# Patient Record
Sex: MALE | Race: WHITE | Employment: OTHER | URBAN - METROPOLITAN AREA
[De-identification: names, ages, dates, MRNs, and addresses within clinical notes are randomized per-mention and may not be internally consistent; named-entity substitution may affect disease eponyms.]

---

## 2019-02-06 ENCOUNTER — TRANSFERRED RECORDS (OUTPATIENT)
Dept: HEALTH INFORMATION MANAGEMENT | Facility: CLINIC | Age: 59
End: 2019-02-06

## 2019-02-08 ENCOUNTER — TELEPHONE (OUTPATIENT)
Dept: ONCOLOGY | Facility: CLINIC | Age: 59
End: 2019-02-08

## 2019-02-08 NOTE — TELEPHONE ENCOUNTER
ONCOLOGY INTAKE: Records Information      APPT INFORMATION:  Referring provider:  Lucho  Referring provider s clinic:  [facility])  Reason for visit/diagnosis:  Prostate Cancer    Were the records received with the referral (via [employer])? No, sb pt    Has patient been seen for any external appt for this diagnosis (enter clinic/location)? [facility]

## 2019-02-11 NOTE — TELEPHONE ENCOUNTER
Spoke with [facility] and they will push imaging as soon as possible.     Request for slides sent to yonathan at 2:44

## 2019-02-12 ENCOUNTER — ONCOLOGY VISIT (OUTPATIENT)
Dept: ONCOLOGY | Facility: CLINIC | Age: 59
End: 2019-02-12
Attending: INTERNAL MEDICINE
Payer: COMMERCIAL

## 2019-02-12 ENCOUNTER — PRE VISIT (OUTPATIENT)
Dept: ONCOLOGY | Facility: CLINIC | Age: 59
End: 2019-02-12

## 2019-02-12 VITALS
RESPIRATION RATE: 16 BRPM | WEIGHT: 242.4 LBS | TEMPERATURE: 97.9 F | DIASTOLIC BLOOD PRESSURE: 82 MMHG | SYSTOLIC BLOOD PRESSURE: 146 MMHG | HEART RATE: 53 BPM | OXYGEN SATURATION: 96 %

## 2019-02-12 DIAGNOSIS — C79.51 MALIGNANT NEOPLASM OF PROSTATE METASTATIC TO BONE (H): Primary | ICD-10-CM

## 2019-02-12 DIAGNOSIS — C61 MALIGNANT NEOPLASM OF PROSTATE METASTATIC TO BONE (H): Primary | ICD-10-CM

## 2019-02-12 DIAGNOSIS — N28.89 RENAL MASS: ICD-10-CM

## 2019-02-12 PROBLEM — E74.39 GLUCOSE INTOLERANCE: Status: ACTIVE | Noted: 2018-12-14

## 2019-02-12 PROBLEM — E78.5 DYSLIPIDEMIA: Status: ACTIVE | Noted: 2017-08-16

## 2019-02-12 PROCEDURE — G0463 HOSPITAL OUTPT CLINIC VISIT: HCPCS | Mod: ZF

## 2019-02-12 PROCEDURE — 99205 OFFICE O/P NEW HI 60 MIN: CPT | Mod: ZP | Performed by: INTERNAL MEDICINE

## 2019-02-12 RX ORDER — AMPICILLIN TRIHYDRATE 250 MG
500 CAPSULE ORAL DAILY
COMMUNITY
End: 2020-07-10

## 2019-02-12 RX ORDER — CETIRIZINE HYDROCHLORIDE 10 MG/1
10 TABLET ORAL PRN
COMMUNITY
Start: 2016-06-28 | End: 2022-08-02

## 2019-02-12 RX ORDER — BICALUTAMIDE 50 MG/1
50 TABLET, FILM COATED ORAL
COMMUNITY
Start: 2019-02-08 | End: 2019-02-14

## 2019-02-12 RX ORDER — HYDROCORTISONE ACETATE 0.5 %
1 CREAM (GRAM) TOPICAL DAILY
COMMUNITY
Start: 2017-08-16 | End: 2024-02-13

## 2019-02-12 RX ORDER — LEVOTHYROXINE SODIUM 125 UG/1
100 TABLET ORAL DAILY
COMMUNITY
Start: 2018-12-14 | End: 2020-07-10

## 2019-02-12 RX ORDER — CYCLOSPORINE 0.5 MG/ML
1 EMULSION OPHTHALMIC 2 TIMES DAILY
COMMUNITY

## 2019-02-12 RX ORDER — AMOXICILLIN 250 MG
2 CAPSULE ORAL DAILY
COMMUNITY
Start: 2016-06-28

## 2019-02-12 RX ORDER — FLUTICASONE PROPIONATE 50 MCG
2 SPRAY, SUSPENSION (ML) NASAL DAILY PRN
COMMUNITY
Start: 2015-06-06 | End: 2022-08-02

## 2019-02-12 RX ORDER — ATORVASTATIN CALCIUM 20 MG/1
60 TABLET, FILM COATED ORAL DAILY
COMMUNITY
Start: 2018-12-14 | End: 2024-07-15 | Stop reason: DRUGHIGH

## 2019-02-12 ASSESSMENT — PAIN SCALES - GENERAL: PAINLEVEL: MILD PAIN (2)

## 2019-02-12 NOTE — LETTER
"2/12/2019       RE: Walter Reyes  [id number]  [facility] [id number]     Dear Colleague,    Thank you for referring your patient, Walter Reyes, to the [facility]. Please see a copy of my visit note below.    MEDICAL ONCOLOGY NEW PATIENT CLINIC NOTE    ENCOUNTER DATE: 2/12/2019  REFERRING PROVIDER: Jorge Alberto Yepez MD at [facility].    REASON FOR CURRENT VISIT: New diagnosis of metastatic prostate cancer.    HISTORY OF PRESENT ILLNESS:  Mr. Walter Reyes is a 58-year-old gentleman referred by Dr. Yepez for a new diagnosis of metastatic castration-sensitive prostate cancer. His oncologic history is detailed below.  His wife, Micheline, accompanies him for this visit.    At this time, there is main symptom is pain at the site of the rib lesion biopsy, as well as some ache in the lower left abdomen.    ONCOLOGIC HISTORY:  1. Prostate adenocarcinoma, stage IV (M1b at diagnosis), high-volume, castration-sensitive:  - 12/13/2018: PSA found to be elevated to 9.1 ng/mL on a routine follow-up with primary care provider Dr. Naylor at [facility]. Prior PSA were 1.4 on 8/16/17, 2.4 on 6/28/16, 2.9 on 6/28/16, and as low as 0.4 on 3/26/2003.   - 1/08/2019: Consultation with Sarah Wilson CNP in Urology clinic. Repeat PSA 9.6.  - 1/16/2019: MRI prostate with contrast - \"This examination is characterized as PIRADS 5- very high probability. Clinically significant cancer is highly likely to be present. There is a large, invasive mass arising from the right peripheral zone and extending into the neurovascular bundle, seminal vesicle, and along the anterolateral right mesorectal fascia. Metastatic right external iliac lymph node. Metastatic lesion in the posterior/superior right acetabulum.\"  - 1/25/2019: CT abdomen and pelvis with IV contrast - \"1. Heterogeneous enhancing mass posteriorly in the upper pole of the right kidney measures 4.5 x 5.8 x " "5.7 cm (AP by transverse by craniocaudal). It has a small nodular component extending posterior medially which abuts the right psoas muscle. This nodular extension measures 2.1 x 2.1 cm. Minimal stranding about the mass. No definite thrombus within the right renal vein. This renal mass is compatible with a renal cell carcinoma. A paraaortic lymph node situated immediately posterior to the left renal vein measures 1.1 cm in short axis, suspicious for metastasis. 2. A 2 cm right external iliac lymph node is also suspicious for metastases. 3. Multiple sclerotic osseous lesions suspicious for metastases. These include 0.8 and 0.6 cm sclerotic lesions laterally in the right iliac wing (images 53 and 62 respectively). Ill-defined groundglass density laterally in the right acetabulum measuring 1.7 x 1.2 cm corresponds with the lesion identified on MRI. A 0.4 cm groundglass density in the left acetabulum. Sclerotic lesion in the left femoral neck measures 0.9 x 1.6 cm (image 81). Sclerotic metastases would be more compatible with prostate metastases. 4. A 3 subcentimeter hepatic lesions are indeterminate. Metastases would be a consideration. These can be further characterized with liver MRI.\"  - 1/25/2019: NM bone scan - \"There is focal bony uptake in the left femoral neck, right acetabulum, right of midline at the S1 or L5 level of the spine, within multiple bilateral ribs, in the C7 or T1 level of the spine, and anteriorly within the skull. Findings are suspicious for metastases.\"  - 1/31/19: CT chest with contrast - \" No suspicious nodules in the chest.  Stable appearance of a 5.8 cm right renal mass concerning for renal carcinoma until proven otherwise.  Stable indeterminant subcentimeter hypodensities in the liver.  Multifocal osteoblastic metastasis including 2.5 cm lesion in the right fifth rib posteriorly and a 1.6 cm lesion in the right third rib posteriorly. No lytic lesions identified.\"  - 2/6/19: CT-guided right " "sclerotic fifth rib lesion biopsy - \"Metastatic carcinoma, consistent with prostate primary.  Immunohistochemical stains performed show the metastatic carcinoma stains positive for NKX3.1 (prostate marker), negative for STACIE 3 and PAX8, which supports the above diagnosis.\"    2. Suspected stage II/III RCC:  - As above.     REVIEW OF SYSTEMS: 14 point ROS negative other than the symptoms noted above in the HPI.    PAST MEDICAL HISTORY:  1. Prostate cancer.  2. Hypothyroidism.  3. Allergic rhinitis.  4. Esophageal reflux disorder.  5. Dyslipidemia.  6. Glucose intolerance.  7. Obesity.     PAST SURGICAL HISTORY:   As above.    SOCIAL HISTORY:   He is  and lives with his wife, Micheline in [place]. He has 4 children and 3 sisters (2 elder brothers  from heart failure). Denies tobacco, alcohol or illicit drug use. 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    FAMILY HISTORY:   He does have a remarkable family history with several maternal relatives affected with cancer.  He has 5 siblings including one sister with skin cancer but no other malignancies.  2 of his brothers passed away at age 68 each because of smoking alcohol use and illicit drug use related heart failure.  Several maternal aunts with lymphoma, colon, liver, breast, and lung cancer, as well as a maternal uncle with lung cancer. His paternal uncle had stomach cancer.     ALLERGIES:   Allergies   Allergen Reactions     Doxycycline GI Disturbance     CURRENT MEDICATIONS:   Current Outpatient Medications:      atorvastatin (LIPITOR) 20 MG tablet, Take 20 mg by mouth daily, Disp: , Rfl:      bicalutamide (CASODEX) 50 MG tablet, Take 50 mg by mouth, Disp: , Rfl:      calcium citrate-vitamin D (CITRACAL) 315-250 MG-UNIT TABS per tablet, Take 650 mg by mouth, Disp: , Rfl:      cetirizine (ZYRTEC) 10 MG tablet, Take 10 mg by mouth as needed, Disp: , Rfl:      cycloSPORINE (RESTASIS) 0.05 % ophthalmic emulsion, 1 " drop by Both Eyelids route 2 times daily, Disp: , Rfl:      fluticasone (FLONASE) 50 MCG/ACT nasal spray, Spray 2 sprays in nostril every 24 hours, Disp: , Rfl:      Glucosamine-Chondroit-Vit C-Mn (GLUCOSAMINE 1500 COMPLEX) CAPS, Take 1 capsule by mouth daily, Disp: , Rfl:      levothyroxine (SYNTHROID/LEVOTHROID) 125 MCG tablet, Take 125 mcg by mouth daily, Disp: , Rfl:      Multiple Vitamins-Minerals (MULTIVITAMIN ADULT EXTRA C PO), Take 1 tablet by mouth every 24 hours, Disp: , Rfl:      omeprazole (PRILOSEC) 20 MG DR capsule, Take 20 mg by mouth daily, Disp: , Rfl:      cinnamon 500 MG CAPS, Take 1,500 mg by mouth daily, Disp: , Rfl:      Nutritional Supplements (SALMON OIL) CAPS, Take 1 capsule by mouth daily, Disp: , Rfl:     PHYSICAL EXAMINATION:  Vital signs: BP (!) 160/97 (BP Location: Right arm, Patient Position: Sitting, Cuff Size: Adult Large)   Pulse 53   Temp 97.9  F (36.6  C) (Oral)   Resp 16   Wt 110 kg (242 lb 6.4 oz)   SpO2 96%   ECOG performance status of 0. Fatigue 0.  GENERAL: Well-nourished healthy-appearing man in chair, no acute distress.   HEENT: No icterus, no pallor. Moist mucous membranes. Oropharynx is clear.   NECK: Supple, no JVD/LAD.  LUNGS: Clear to ausculation bilaterally, normal work of breathing.   CARDIOVASCULAR: Regular rate and rhythm, no murmurs, gallops or rubs.   ABDOMEN: Soft, nontender and nondistended, no palpable masses, bowel sounds present.  EXTREMITIES: No cyanosis, no clubbing, no edema.   NEUROLOGIC: No focal deficits, CN 2-12 intact.  LYMPH NODE EXAM: No palpable adenopathy - cervical, axillary or inguinal.     LABORATORY DATA:  As above.    IMAGING STUDIES:  As above.    ASSESSMENT AND PLAN: Mr. Reyes is a delightful 58-year-old gentleman with newly diagnosed metastatic castration sensitive prostate adenocarcinoma as well as a suspected stage II versus 3 renal cell carcinoma of right kidney, who is here for a second opinion.    Metastatic prostate  "cancer:   - I reviewed the available diagnostic data including staging MRI, CT and bone scan results as well as the pathology report (from outside institution) with the patient and his wife at length today. He meets the criteria for CHAARTED \"high-volume\" metastatic hormone-sensitive prostate cancer.  - Most striking is the finding of 2 concurrent malignancies in this relatively young gentleman with a very high risk family history of malignancies.  While biologically there is a little overlap between prostate cancer and renal cancer, this can be seen in some inherited syndromes.  If present, such an aberration could have implications for his prostate cancer biology as well as clinical trajectory.  - I do not have a clinical trial for this gentleman at this time, especially given the suspicion for a concurrent malignancy.  We briefly discussed the biobanking study that he could potentially enroll in, but this would only be for research purposes.    - We discussed systemic therapy options for newly-diagnosed metastatic hormone-sensitive prostate cancer including GNRH agonist versus antagonist by itself versus GNRH agonist/antagonist in combination with docetaxel (ARTUROED, [id number], MOOKIEE) or abiraterone (KARLA, BERNA) based on his disease volume and preference in terms of therapeutic options.   - He wants to pursue chemohormonal therapy after understanding the options. His decision is based on the relatively short course of chemotherapy, financial considerations (copay with abiraterone), and lack of strong desire to be on an oral antineoplastic therapy for several years potentially.   - He understands the risks of docetaxel treatment including fatigue, nausea, vomiting, diarrhea/constipation, hand-foot syndrome, allergic reactions, myelosuppression with increased risk of infection and bleeding etc. We discussed the risks and benefits of ADT including hot flashes, mood changes and long-term " cardiovascular, bone health, etc. side effects in detail.  - I did encourage him to follow-up with Dr. Yepez's clinic to get started on ADT as soon as possible.  The likelihood of a bone flare phenomena with his tumor burden and distribution is low, but it is appropriate to start with 2 weeks of Casodex followed by Lupron indefinitely.    - Timing of docetaxel therapy will depend on workup of renal mass. My preference is to work up renal carcinoma issue first as docetaxel can be initiated several months after ADT with no deleterious impact. If docetaxel is started now, definitive management of renal carcinoma would likely be deferred for 6-7 months (4.5 months of chemo, 1.5-3 months of recovery).  - Also encouraged to discuss antiresorptive therapies with his primary oncologist.    - He will need to be referred to genetic counselor as soon as possible.      Possible kidney cancer:  - I reviewed the available diagnostic data as it pertains to possibly renal cancer diagnosis including staging CT and bone scan results.  The renal mass does appear to invade through the capsule and into the psoas muscle ipsilaterally with potential lymph node enlargement in the drainage basin.    - I have recommended that he see 1 of our urologic oncologist for this issue.  Also recommended that we discussed his case in the tumor board.    - His kidney cancer may be curable he understands that there is a likelihood that his kidney cancer may be curable with a radical nephrectomy.  This will have implications on his prostate cancer management further outline especially if there are disparate radiographic findings in the future such as growing pulmonary nodules and sclerosing osteoblastic metastases in the bone.  It will also have applications on his eligibility for clinical trials in the future as patients with a concurrent active malignancy are almost uniformly excluded from clinical trials.     After this discussion, the patient has  opted to take a couple days and let me know if they want to proceed with urology and genetics referral as well as a tumor board discussion.  They have not yet decided on where to pursue systemic therapy for prostate cancer, and will let our clinic know about that as well.  If they want to continue care with me, he'll need to start Casodex this week.     No follow-ups will be set at this time, but he was given my contact information should he need to reach out in the future.    Patient and family were very appreciative of the care provided and in complete agreement this plan.  All questions were answered.    I appreciate Dr. Yepez's referral to the clinic and will send him this note for review.      Uday Smalls M.D.      CC: Jorge Alberto Yepez MD at [facility].

## 2019-02-12 NOTE — NURSING NOTE
Oncology Rooming Note    February 12, 2019 3:21 PM   Walter Reyes is a 58 year old male who presents for:    Chief Complaint   Patient presents with     Oncology Clinic Visit     new pt pending BX prostate cancer second opinion      Initial Vitals: BP (!) 160/97 (BP Location: Right arm, Patient Position: Sitting, Cuff Size: Adult Large)   Pulse 53   Temp 97.9  F (36.6  C) (Oral)   Resp 16   Wt 110 kg (242 lb 6.4 oz)   SpO2 96%  There is no height or weight on file to calculate BMI. There is no height or weight on file to calculate BSA.  Mild Pain (2) Comment: Data Unavailable   No LMP for male patient.  Allergies reviewed: Yes  Medications reviewed: Yes    Medications Refill is not needed   Pharmacy name entered into EPIC: [facility] [place], [place] - [address] [address]    Clinical concerns: none      8 minutes for nursing intake (face to face time)     Luz Danilo, LISSETTE

## 2019-02-12 NOTE — PROGRESS NOTES
"MEDICAL ONCOLOGY NEW PATIENT CLINIC NOTE    ENCOUNTER DATE: 2/12/2019  REFERRING PROVIDER: Jorge Alberto Yepez MD at [facility].    REASON FOR CURRENT VISIT: New diagnosis of metastatic prostate cancer.    HISTORY OF PRESENT ILLNESS:  Mr. Walter Reyes is a 58-year-old gentleman referred by Dr. Yepez for a new diagnosis of metastatic castration-sensitive prostate cancer. His oncologic history is detailed below.  His wife, Micheline, accompanies him for this visit.    At this time, there is main symptom is pain at the site of the rib lesion biopsy, as well as some ache in the lower left abdomen.    ONCOLOGIC HISTORY:  1. Prostate adenocarcinoma, stage IV (M1b at diagnosis), high-volume, castration-sensitive:  - 12/13/2018: PSA found to be elevated to 9.1 ng/mL on a routine follow-up with primary care provider Dr. Naylor at [facility]. Prior PSA were 1.4 on 8/16/17, 2.4 on 6/28/16, 2.9 on 6/28/16, and as low as 0.4 on 3/26/2003.   - 1/08/2019: Consultation with Sarah Wilson CNP in Urology clinic. Repeat PSA 9.6.  - 1/16/2019: MRI prostate with contrast - \"This examination is characterized as PIRADS 5- very high probability. Clinically significant cancer is highly likely to be present. There is a large, invasive mass arising from the right peripheral zone and extending into the neurovascular bundle, seminal vesicle, and along the anterolateral right mesorectal fascia. Metastatic right external iliac lymph node. Metastatic lesion in the posterior/superior right acetabulum.\"  - 1/25/2019: CT abdomen and pelvis with IV contrast - \"1. Heterogeneous enhancing mass posteriorly in the upper pole of the right kidney measures 4.5 x 5.8 x 5.7 cm (AP by transverse by craniocaudal). It has a small nodular component extending posterior medially which abuts the right psoas muscle. This nodular extension measures 2.1 x 2.1 cm. Minimal stranding about the mass. No definite thrombus within the right " "renal vein. This renal mass is compatible with a renal cell carcinoma. A paraaortic lymph node situated immediately posterior to the left renal vein measures 1.1 cm in short axis, suspicious for metastasis. 2. A 2 cm right external iliac lymph node is also suspicious for metastases. 3. Multiple sclerotic osseous lesions suspicious for metastases. These include 0.8 and 0.6 cm sclerotic lesions laterally in the right iliac wing (images 53 and 62 respectively). Ill-defined groundglass density laterally in the right acetabulum measuring 1.7 x 1.2 cm corresponds with the lesion identified on MRI. A 0.4 cm groundglass density in the left acetabulum. Sclerotic lesion in the left femoral neck measures 0.9 x 1.6 cm (image 81). Sclerotic metastases would be more compatible with prostate metastases. 4. A 3 subcentimeter hepatic lesions are indeterminate. Metastases would be a consideration. These can be further characterized with liver MRI.\"  - 1/25/2019: NM bone scan - \"There is focal bony uptake in the left femoral neck, right acetabulum, right of midline at the S1 or L5 level of the spine, within multiple bilateral ribs, in the C7 or T1 level of the spine, and anteriorly within the skull. Findings are suspicious for metastases.\"  - 1/31/19: CT chest with contrast - \" No suspicious nodules in the chest.  Stable appearance of a 5.8 cm right renal mass concerning for renal carcinoma until proven otherwise.  Stable indeterminant subcentimeter hypodensities in the liver.  Multifocal osteoblastic metastasis including 2.5 cm lesion in the right fifth rib posteriorly and a 1.6 cm lesion in the right third rib posteriorly. No lytic lesions identified.\"  - 2/6/19: CT-guided right sclerotic fifth rib lesion biopsy - \"Metastatic carcinoma, consistent with prostate primary.  Immunohistochemical stains performed show the metastatic carcinoma stains positive for NKX3.1 (prostate marker), negative for STACIE 3 and PAX8, which supports the " "above diagnosis.\"    2. Suspected stage II/III RCC:  - As above.     REVIEW OF SYSTEMS: 14 point ROS negative other than the symptoms noted above in the HPI.    PAST MEDICAL HISTORY:  1. Prostate cancer.  2. Hypothyroidism.  3. Allergic rhinitis.  4. Esophageal reflux disorder.  5. Dyslipidemia.  6. Glucose intolerance.  7. Obesity.     PAST SURGICAL HISTORY:   As above.    SOCIAL HISTORY:   He is  and lives with his wife, Micheline in [place]. He has 4 children and 3 sisters (2 elder brothers  from heart failure). Denies tobacco, alcohol or illicit drug use. 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    FAMILY HISTORY:   He does have a remarkable family history with several maternal relatives affected with cancer.  He has 5 siblings including one sister with skin cancer but no other malignancies.  2 of his brothers passed away at age 68 each because of smoking alcohol use and illicit drug use related heart failure.  Several maternal aunts with lymphoma, colon, liver, breast, and lung cancer, as well as a maternal uncle with lung cancer. His paternal uncle had stomach cancer.     ALLERGIES:   Allergies   Allergen Reactions     Doxycycline GI Disturbance     CURRENT MEDICATIONS:   Current Outpatient Medications:      atorvastatin (LIPITOR) 20 MG tablet, Take 20 mg by mouth daily, Disp: , Rfl:      bicalutamide (CASODEX) 50 MG tablet, Take 50 mg by mouth, Disp: , Rfl:      calcium citrate-vitamin D (CITRACAL) 315-250 MG-UNIT TABS per tablet, Take 650 mg by mouth, Disp: , Rfl:      cetirizine (ZYRTEC) 10 MG tablet, Take 10 mg by mouth as needed, Disp: , Rfl:      cycloSPORINE (RESTASIS) 0.05 % ophthalmic emulsion, 1 drop by Both Eyelids route 2 times daily, Disp: , Rfl:      fluticasone (FLONASE) 50 MCG/ACT nasal spray, Spray 2 sprays in nostril every 24 hours, Disp: , Rfl:      Glucosamine-Chondroit-Vit C-Mn (GLUCOSAMINE 1500 COMPLEX) CAPS, Take 1 capsule by mouth " "daily, Disp: , Rfl:      levothyroxine (SYNTHROID/LEVOTHROID) 125 MCG tablet, Take 125 mcg by mouth daily, Disp: , Rfl:      Multiple Vitamins-Minerals (MULTIVITAMIN ADULT EXTRA C PO), Take 1 tablet by mouth every 24 hours, Disp: , Rfl:      omeprazole (PRILOSEC) 20 MG DR capsule, Take 20 mg by mouth daily, Disp: , Rfl:      cinnamon 500 MG CAPS, Take 1,500 mg by mouth daily, Disp: , Rfl:      Nutritional Supplements (SALMON OIL) CAPS, Take 1 capsule by mouth daily, Disp: , Rfl:     PHYSICAL EXAMINATION:  Vital signs: BP (!) 160/97 (BP Location: Right arm, Patient Position: Sitting, Cuff Size: Adult Large)   Pulse 53   Temp 97.9  F (36.6  C) (Oral)   Resp 16   Wt 110 kg (242 lb 6.4 oz)   SpO2 96%   ECOG performance status of 0. Fatigue 0.  GENERAL: Well-nourished healthy-appearing man in chair, no acute distress.   HEENT: No icterus, no pallor. Moist mucous membranes. Oropharynx is clear.   NECK: Supple, no JVD/LAD.  LUNGS: Clear to ausculation bilaterally, normal work of breathing.   CARDIOVASCULAR: Regular rate and rhythm, no murmurs, gallops or rubs.   ABDOMEN: Soft, nontender and nondistended, no palpable masses, bowel sounds present.  EXTREMITIES: No cyanosis, no clubbing, no edema.   NEUROLOGIC: No focal deficits, CN 2-12 intact.  LYMPH NODE EXAM: No palpable adenopathy - cervical, axillary or inguinal.     LABORATORY DATA:  As above.    IMAGING STUDIES:  As above.    ASSESSMENT AND PLAN: Mr. Reyes is a delightful 58-year-old gentleman with newly diagnosed metastatic castration sensitive prostate adenocarcinoma as well as a suspected stage II versus 3 renal cell carcinoma of right kidney, who is here for a second opinion.    Metastatic prostate cancer:   - I reviewed the available diagnostic data including staging MRI, CT and bone scan results as well as the pathology report (from outside institution) with the patient and his wife at length today. He meets the criteria for CHAARTED \"high-volume\" " metastatic hormone-sensitive prostate cancer.  - Most striking is the finding of 2 concurrent malignancies in this relatively young gentleman with a very high risk family history of malignancies.  While biologically there is a little overlap between prostate cancer and renal cancer, this can be seen in some inherited syndromes.  If present, such an aberration could have implications for his prostate cancer biology as well as clinical trajectory.  - I do not have a clinical trial for this gentleman at this time, especially given the suspicion for a concurrent malignancy.  We briefly discussed the biobanking study that he could potentially enroll in, but this would only be for research purposes.    - We discussed systemic therapy options for newly-diagnosed metastatic hormone-sensitive prostate cancer including GNRH agonist versus antagonist by itself versus GNRH agonist/antagonist in combination with docetaxel (CHAARTED, GETUG-AFU15, STAMPEDE) or abiraterone (KARLA, LATMITCHELL) based on his disease volume and preference in terms of therapeutic options.   - He wants to pursue chemohormonal therapy after understanding the options. His decision is based on the relatively short course of chemotherapy, financial considerations (copay with abiraterone), and lack of strong desire to be on an oral antineoplastic therapy for several years potentially.   - He understands the risks of docetaxel treatment including fatigue, nausea, vomiting, diarrhea/constipation, hand-foot syndrome, allergic reactions, myelosuppression with increased risk of infection and bleeding etc. We discussed the risks and benefits of ADT including hot flashes, mood changes and long-term cardiovascular, bone health, etc. side effects in detail.  - I did encourage him to follow-up with Dr. Yepez's clinic to get started on ADT as soon as possible.  The likelihood of a bone flare phenomena with his tumor burden and distribution is low, but it is  appropriate to start with 2 weeks of Casodex followed by Lupron indefinitely.    - Timing of docetaxel therapy will depend on workup of renal mass. My preference is to work up renal carcinoma issue first as docetaxel can be initiated several months after ADT with no deleterious impact. If docetaxel is started now, definitive management of renal carcinoma would likely be deferred for 6-7 months (4.5 months of chemo, 1.5-3 months of recovery).  - Also encouraged to discuss antiresorptive therapies with his primary oncologist.    - He will need to be referred to genetic counselor as soon as possible.      Possible kidney cancer:  - I reviewed the available diagnostic data as it pertains to possibly renal cancer diagnosis including staging CT and bone scan results.  The renal mass does appear to invade through the capsule and into the psoas muscle ipsilaterally with potential lymph node enlargement in the drainage basin.    - I have recommended that he see 1 of our urologic oncologist for this issue.  Also recommended that we discussed his case in the tumor board.    - His kidney cancer may be curable he understands that there is a likelihood that his kidney cancer may be curable with a radical nephrectomy.  This will have implications on his prostate cancer management further outline especially if there are disparate radiographic findings in the future such as growing pulmonary nodules and sclerosing osteoblastic metastases in the bone.  It will also have applications on his eligibility for clinical trials in the future as patients with a concurrent active malignancy are almost uniformly excluded from clinical trials.     After this discussion, the patient has opted to take a couple days and let me know if they want to proceed with urology and genetics referral as well as a tumor board discussion.  They have not yet decided on where to pursue systemic therapy for prostate cancer, and will let our clinic know about that  as well.  If they want to continue care with me, he'll need to start Casodex this week.     No follow-ups will be set at this time, but he was given my contact information should he need to reach out in the future.    Patient and family were very appreciative of the care provided and in complete agreement this plan.  All questions were answered.    I appreciate Dr. Yepez's referral to the clinic and will send him this note for review.    BILLIN.    Uday Smalls M.D.  . Professor of Medicine  Genitourinary Oncology  Division of Hematology, Oncology & Transplantation  [facility]    CC: Jorge Alberto Yepez MD at [facility].

## 2019-02-13 ENCOUNTER — CARE COORDINATION (OUTPATIENT)
Dept: ONCOLOGY | Facility: CLINIC | Age: 59
End: 2019-02-13

## 2019-02-13 NOTE — PROGRESS NOTES
TC from pt stating that he does want to transfer care to [facility] from [facility]. Told pt writer will call Dr. Smalls and confirm what the next steps should be and call him back.     TC to Dr. Smalls who stated that pt is to start bicalutamide this week, 50 mg daily. Pt to be asked if he would consider enrolling in the biorepository study as discussed during his appt. If he is interested, Rosina will arrange for appointment for blood collection. Dr. Smalls to initiate communication with Rosina.     TC back to pt to explain plan for now. Pt stated he has an Rx for bicalutamide ready at the pharmacy from his other oncologist and will go pick it up. Told pt that per Dr. Smalls, his dose should be bicalutamide 50 mg day and to call us if the dose is different to call [facility]. Pt also stated he needs short-term disability paperwork filled out; writer provided pt with clinic fax number. Pt stated understanding of all and agrees to call with any further questions or concerns. Pt asked when he will be starting on Lupron. Writer told pt to expect a call back regarding scheduling once Dr. Smalls is consulted. Message sent to Dr. Smalls regarding first Lupron. Await response.

## 2019-02-14 ENCOUNTER — TELEPHONE (OUTPATIENT)
Dept: ONCOLOGY | Facility: CLINIC | Age: 59
End: 2019-02-14

## 2019-02-14 ENCOUNTER — CARE COORDINATION (OUTPATIENT)
Dept: ONCOLOGY | Facility: CLINIC | Age: 59
End: 2019-02-14

## 2019-02-14 DIAGNOSIS — C61 MALIGNANT NEOPLASM OF PROSTATE METASTATIC TO BONE (H): Primary | ICD-10-CM

## 2019-02-14 DIAGNOSIS — C79.51 MALIGNANT NEOPLASM OF PROSTATE METASTATIC TO BONE (H): Primary | ICD-10-CM

## 2019-02-14 LAB — COPATH REPORT: NORMAL

## 2019-02-14 PROCEDURE — 00000346 ZZHCL STATISTIC REVIEW OUTSIDE SLIDES TC 88321: Performed by: INTERNAL MEDICINE

## 2019-02-14 RX ORDER — BICALUTAMIDE 50 MG/1
50 TABLET, FILM COATED ORAL DAILY
Qty: 30 TABLET | Refills: 0 | Status: SHIPPED | OUTPATIENT
Start: 2019-02-14 | End: 2019-04-30

## 2019-02-14 NOTE — PROGRESS NOTES
TC from pt's wife, Micheline, stating that the Rx for pt's Casodex was not at the pharmacy from pt's previous pharmacy as thought. Advised Micheline to call pt's oncologist a [facility] and ask if they would be willing to send it to the pharmacy as Dr. Smalls is out of town. Also advised that she may want to try calling pt's urologist at [facility] to see if they'd be willing to send Rx. Micheline stated understanding.     Micheline called back stating that [facility] providers want all care and all Rx's managed by [facility] providers. Casodex is not yet entered in pt's chart. Email sent to Dr. Smalls requesting he enter a new plan for Casodex. Await response.     Per Dr. Smalls:  Uday Smalls MD Jankovich, Diana, RN; Rosina Colón CMA Great. Thanks Aspen.   1. I've ordered Casodex and Urology referral. He should start this on Saturday.   2. Rosina - can you have him come in today/tomorrow to get consent and first blood sample? He has a rib biopsy at outside institution, so we should get him collected before starting Casodex.   3. I'll see him back on 2/26 to start Lupron. Orders placed. Please help schedule.   AR      Spoke with oral chemotherapy pharmacist, Kirby PUENTES, who stated that he can get Rx to pt via  this afternoon and will call pt and do the chemo teach. Requested Kirby tell pt to wait to start Casodex until after he gets his blood drawn for the Biorepository Study.    TC to Micheline to let her know to expect Rx to arrive today via  and that pt should expect a TC regarding chemo teaching. Micheline stated understanding and will give pt a heads-up to expect a call regarding chemo teaching.     Paged Rosina to let her know that pt wants to proceed with Biorepository Study. Rosina stated she will call pt to arrange visit to be consented and have blood drawn. Requested Rosina inform pt that Dr. Smalls wants him to wait to start his Casodex until after he has his blood drawn. Rosina agreed to pass message along.     TC from  Rosina stating that she will see pt tomorrow, 02/15/2019, at 1000. She stated she will make a lab appt for pt for study blood draw. Per Dr. Smalls, pt needs CBC/diff, CMP, PSA tumor marker, testosterone total, and chromogranin A. Rosina stated she will have these standard of care labs drawn as well as the study blood draw.

## 2019-02-14 NOTE — ORAL ONC MGMT
Oral Chemotherapy Monitoring Program    Primary Oncologist: Dr. Smalls  Primary Oncology Clinic: [facility]  Cancer Diagnosis: Prostate Cancer    Drug: biclutamide 50mg once daily   Start Date: 2/16/2019 after biorepository study   Dose is appropriate for patients:  Renal Function and  Hepatic Function   Expected duration of therapy: Until disease progression or unacceptable toxicity    Drug Interaction Assessment: No drug-drug interaction upon review of medication list:  -AtorvaSTATin -Citracal -Cetirizine -Restasis -Flonase -Glucosamine -Levothyroxine -Multivitamins -Omeprazole    Lab Monitoring Plan  No lab follow up.   Subjective/Objective:  Walter Reyes is a 58 year old male contacted by phone for an initial visit for oral chemotherapy education.        Last PHQ-2 Score on record: No flowsheet data found.    Patient does not report depression symptoms.      Vitals:  BP:   BP Readings from Last 1 Encounters:   02/12/19 146/82     Wt Readings from Last 1 Encounters:   02/12/19 110 kg (242 lb 6.4 oz)     There is no height or weight on file to calculate BSA.      Assessment:  Patient is appropriate to start therapy.    Plan:  Basic chemotherapy teaching was reviewed with the patient including indication, start date of therapy, dose, administration, adverse effects, missed doses, food and drug interactions, monitoring, side effect management, office contact information, and safe handling. Written materials were mailed 2/14/2019 and all questions answered.    Walter will start after lab draw for biorepository study. Instructed patient to call [facility] at [phone] to schedule delivery.    Follow-Up:  Oral chemotherapy monitoring program does not follow biclutamide and will not follow patient for follow up.        Thank you,  Kirby De La Garza, PharmD  Hematology/Oncology Clinical Pharmacist  [facility]

## 2019-02-15 ENCOUNTER — RESEARCH ENCOUNTER (OUTPATIENT)
Dept: ONCOLOGY | Facility: CLINIC | Age: 59
End: 2019-02-15

## 2019-02-15 ENCOUNTER — APPOINTMENT (OUTPATIENT)
Dept: ONCOLOGY | Facility: CLINIC | Age: 59
End: 2019-02-15
Attending: INTERNAL MEDICINE
Payer: COMMERCIAL

## 2019-02-15 DIAGNOSIS — C79.51 MALIGNANT NEOPLASM OF PROSTATE METASTATIC TO BONE (H): ICD-10-CM

## 2019-02-15 DIAGNOSIS — C61 MALIGNANT NEOPLASM OF PROSTATE METASTATIC TO BONE (H): ICD-10-CM

## 2019-02-15 LAB
ALBUMIN SERPL-MCNC: 3.6 G/DL (ref 3.4–5)
ALP SERPL-CCNC: 91 U/L (ref 40–150)
ALT SERPL W P-5'-P-CCNC: 26 U/L (ref 0–70)
ANION GAP SERPL CALCULATED.3IONS-SCNC: 7 MMOL/L (ref 3–14)
AST SERPL W P-5'-P-CCNC: 21 U/L (ref 0–45)
BASOPHILS # BLD AUTO: 0 10E9/L (ref 0–0.2)
BASOPHILS NFR BLD AUTO: 0.6 %
BILIRUB SERPL-MCNC: 0.5 MG/DL (ref 0.2–1.3)
BUN SERPL-MCNC: 15 MG/DL (ref 7–30)
CALCIUM SERPL-MCNC: 8.6 MG/DL (ref 8.5–10.1)
CHLORIDE SERPL-SCNC: 108 MMOL/L (ref 94–109)
CO2 SERPL-SCNC: 25 MMOL/L (ref 20–32)
CREAT SERPL-MCNC: 0.78 MG/DL (ref 0.66–1.25)
DIFFERENTIAL METHOD BLD: NORMAL
EOSINOPHIL # BLD AUTO: 0.2 10E9/L (ref 0–0.7)
EOSINOPHIL NFR BLD AUTO: 4.1 %
ERYTHROCYTE [DISTWIDTH] IN BLOOD BY AUTOMATED COUNT: 13.7 % (ref 10–15)
GFR SERPL CREATININE-BSD FRML MDRD: >90 ML/MIN/{1.73_M2}
GLUCOSE SERPL-MCNC: 86 MG/DL (ref 70–99)
HCT VFR BLD AUTO: 43.1 % (ref 40–53)
HGB BLD-MCNC: 14.7 G/DL (ref 13.3–17.7)
IMM GRANULOCYTES # BLD: 0 10E9/L (ref 0–0.4)
IMM GRANULOCYTES NFR BLD: 0.2 %
LYMPHOCYTES # BLD AUTO: 1.8 10E9/L (ref 0.8–5.3)
LYMPHOCYTES NFR BLD AUTO: 35.9 %
MCH RBC QN AUTO: 29.1 PG (ref 26.5–33)
MCHC RBC AUTO-ENTMCNC: 34.1 G/DL (ref 31.5–36.5)
MCV RBC AUTO: 85 FL (ref 78–100)
MONOCYTES # BLD AUTO: 0.5 10E9/L (ref 0–1.3)
MONOCYTES NFR BLD AUTO: 9.9 %
NEUTROPHILS # BLD AUTO: 2.5 10E9/L (ref 1.6–8.3)
NEUTROPHILS NFR BLD AUTO: 49.3 %
NRBC # BLD AUTO: 0 10*3/UL
NRBC BLD AUTO-RTO: 0 /100
PLATELET # BLD AUTO: 215 10E9/L (ref 150–450)
POTASSIUM SERPL-SCNC: 3.9 MMOL/L (ref 3.4–5.3)
PROT SERPL-MCNC: 7.4 G/DL (ref 6.8–8.8)
PSA SERPL-MCNC: 12.3 UG/L (ref 0–4)
RBC # BLD AUTO: 5.05 10E12/L (ref 4.4–5.9)
SODIUM SERPL-SCNC: 140 MMOL/L (ref 133–144)
WBC # BLD AUTO: 5.1 10E9/L (ref 4–11)

## 2019-02-15 PROCEDURE — 84153 ASSAY OF PSA TOTAL: CPT | Performed by: INTERNAL MEDICINE

## 2019-02-15 PROCEDURE — 86316 IMMUNOASSAY TUMOR OTHER: CPT | Performed by: INTERNAL MEDICINE

## 2019-02-15 PROCEDURE — 80053 COMPREHEN METABOLIC PANEL: CPT | Performed by: INTERNAL MEDICINE

## 2019-02-15 PROCEDURE — 85025 COMPLETE CBC W/AUTO DIFF WBC: CPT | Performed by: INTERNAL MEDICINE

## 2019-02-15 PROCEDURE — 84403 ASSAY OF TOTAL TESTOSTERONE: CPT | Performed by: INTERNAL MEDICINE

## 2019-02-15 PROCEDURE — 36415 COLL VENOUS BLD VENIPUNCTURE: CPT

## 2019-02-15 NOTE — PROGRESS NOTES
[id number]: Informed Consent Note     The consent form, including purpose, risks and benefits, was reviewed with Walter Booth Germányue, and all questions were answered before he signed the consent form. The patient understands that the study involves an active treatment phase as well as a post-treatment follow up phase.     Present during the discussion was myself and his wife. A copy of the signed form was provided to the patient. No procedures specific to this study were performed prior to the patient signing the consent form.    Consent Version Date: 07 DEC 2018  Consent obtained by: Rosina Colón    Date: 15 FEB 2019  HIPAA authorization signed?: yes  HIPAA authorization version date: 21 NOV 2018    Rosina Colón  Clinical Research Coordinator:  Phone: [phone]  Pager: [phone]    Form 503.03.01 (Version 2)     Effective date: 01AUG2018     Next Review Date: 01AUG2020 [id number]: Study Visit Note   Subject name: Walter Reyes     Visit: Baseline    Did the study visit occur within the appropriate window allowed by the protocol? yes    If no, why? N/A    Baseline blood was collected after signed consent.   1 Heparin tube notification received - TTL Sample ID: [id number]  2 EDTA tube notification received - TTL Sample ID: [id number]  Walter Reyes was given the opportunity to ask any trial related questions.     Rosina Colón  Clinical Research Coordinator:  Phone: [phone]  Pager: [phone]

## 2019-02-15 NOTE — NURSING NOTE
Chief Complaint   Patient presents with     Blood Draw     Pt is here for venipuncture, blood draw only.     Lilli Casillas CMA

## 2019-02-16 LAB — CGA SERPL-MCNC: 475 NG/ML (ref 0–95)

## 2019-02-19 LAB — TESTOST SERPL-MCNC: 222 NG/DL (ref 240–950)

## 2019-02-20 ENCOUNTER — DOCUMENTATION ONLY (OUTPATIENT)
Dept: ONCOLOGY | Facility: CLINIC | Age: 59
End: 2019-02-20

## 2019-02-20 NOTE — PROGRESS NOTES
Form Request Documentation    Date Received in Clinic:    Name/Type of Form: [employer] Disability  Questions that need to be addressed:   Current Employment Status: currently working on a full time basis    Amount of Leave Requested: Intermittent Leave now - TBD   Other: None  Date Completed: 2/20/2019  Copy Mailed to patient: Yes on 2/20/2019  Disposition of Form: Fax to [employer] at [phone] on 2/20/2019

## 2019-02-26 ENCOUNTER — APPOINTMENT (OUTPATIENT)
Dept: LAB | Facility: CLINIC | Age: 59
End: 2019-02-26
Attending: INTERNAL MEDICINE
Payer: COMMERCIAL

## 2019-02-26 ENCOUNTER — ONCOLOGY VISIT (OUTPATIENT)
Dept: ONCOLOGY | Facility: CLINIC | Age: 59
End: 2019-02-26
Attending: INTERNAL MEDICINE
Payer: COMMERCIAL

## 2019-02-26 VITALS
OXYGEN SATURATION: 97 % | SYSTOLIC BLOOD PRESSURE: 138 MMHG | RESPIRATION RATE: 18 BRPM | TEMPERATURE: 98.6 F | HEIGHT: 72 IN | DIASTOLIC BLOOD PRESSURE: 84 MMHG | WEIGHT: 204.5 LBS | HEART RATE: 52 BPM | BODY MASS INDEX: 27.7 KG/M2

## 2019-02-26 DIAGNOSIS — C79.51 MALIGNANT NEOPLASM OF PROSTATE METASTATIC TO BONE (H): Primary | ICD-10-CM

## 2019-02-26 DIAGNOSIS — C61 MALIGNANT NEOPLASM OF PROSTATE METASTATIC TO BONE (H): Primary | ICD-10-CM

## 2019-02-26 LAB
ALBUMIN SERPL-MCNC: 3.8 G/DL (ref 3.4–5)
ALP SERPL-CCNC: 98 U/L (ref 40–150)
ALT SERPL W P-5'-P-CCNC: 26 U/L (ref 0–70)
ANION GAP SERPL CALCULATED.3IONS-SCNC: 6 MMOL/L (ref 3–14)
AST SERPL W P-5'-P-CCNC: 23 U/L (ref 0–45)
BASOPHILS # BLD AUTO: 0 10E9/L (ref 0–0.2)
BASOPHILS NFR BLD AUTO: 0.6 %
BILIRUB SERPL-MCNC: 0.3 MG/DL (ref 0.2–1.3)
BUN SERPL-MCNC: 16 MG/DL (ref 7–30)
CALCIUM SERPL-MCNC: 8.5 MG/DL (ref 8.5–10.1)
CHLORIDE SERPL-SCNC: 108 MMOL/L (ref 94–109)
CO2 SERPL-SCNC: 26 MMOL/L (ref 20–32)
CREAT SERPL-MCNC: 0.87 MG/DL (ref 0.66–1.25)
DIFFERENTIAL METHOD BLD: ABNORMAL
EOSINOPHIL # BLD AUTO: 0.2 10E9/L (ref 0–0.7)
EOSINOPHIL NFR BLD AUTO: 3.4 %
ERYTHROCYTE [DISTWIDTH] IN BLOOD BY AUTOMATED COUNT: 13.8 % (ref 10–15)
GFR SERPL CREATININE-BSD FRML MDRD: >90 ML/MIN/{1.73_M2}
GLUCOSE SERPL-MCNC: 87 MG/DL (ref 70–99)
HCT VFR BLD AUTO: 45.3 % (ref 40–53)
HGB BLD-MCNC: 14.1 G/DL (ref 13.3–17.7)
IMM GRANULOCYTES # BLD: 0 10E9/L (ref 0–0.4)
IMM GRANULOCYTES NFR BLD: 0.3 %
LYMPHOCYTES # BLD AUTO: 2 10E9/L (ref 0.8–5.3)
LYMPHOCYTES NFR BLD AUTO: 30.1 %
MCH RBC QN AUTO: 27.6 PG (ref 26.5–33)
MCHC RBC AUTO-ENTMCNC: 31.1 G/DL (ref 31.5–36.5)
MCV RBC AUTO: 89 FL (ref 78–100)
MONOCYTES # BLD AUTO: 0.6 10E9/L (ref 0–1.3)
MONOCYTES NFR BLD AUTO: 9.9 %
NEUTROPHILS # BLD AUTO: 3.6 10E9/L (ref 1.6–8.3)
NEUTROPHILS NFR BLD AUTO: 55.7 %
NRBC # BLD AUTO: 0 10*3/UL
NRBC BLD AUTO-RTO: 0 /100
PLATELET # BLD AUTO: 231 10E9/L (ref 150–450)
POTASSIUM SERPL-SCNC: 3.6 MMOL/L (ref 3.4–5.3)
PROT SERPL-MCNC: 7.6 G/DL (ref 6.8–8.8)
PSA SERPL-MCNC: 8 UG/L (ref 0–4)
RBC # BLD AUTO: 5.11 10E12/L (ref 4.4–5.9)
SODIUM SERPL-SCNC: 140 MMOL/L (ref 133–144)
WBC # BLD AUTO: 6.5 10E9/L (ref 4–11)

## 2019-02-26 PROCEDURE — 86316 IMMUNOASSAY TUMOR OTHER: CPT | Performed by: INTERNAL MEDICINE

## 2019-02-26 PROCEDURE — 84153 ASSAY OF PSA TOTAL: CPT | Performed by: INTERNAL MEDICINE

## 2019-02-26 PROCEDURE — 85025 COMPLETE CBC W/AUTO DIFF WBC: CPT | Performed by: INTERNAL MEDICINE

## 2019-02-26 PROCEDURE — G0463 HOSPITAL OUTPT CLINIC VISIT: HCPCS | Mod: ZF

## 2019-02-26 PROCEDURE — 84403 ASSAY OF TOTAL TESTOSTERONE: CPT | Performed by: INTERNAL MEDICINE

## 2019-02-26 PROCEDURE — 25000128 H RX IP 250 OP 636: Mod: ZF | Performed by: INTERNAL MEDICINE

## 2019-02-26 PROCEDURE — 96402 CHEMO HORMON ANTINEOPL SQ/IM: CPT

## 2019-02-26 PROCEDURE — 36415 COLL VENOUS BLD VENIPUNCTURE: CPT

## 2019-02-26 PROCEDURE — 99215 OFFICE O/P EST HI 40 MIN: CPT | Mod: ZP | Performed by: INTERNAL MEDICINE

## 2019-02-26 PROCEDURE — 80053 COMPREHEN METABOLIC PANEL: CPT | Performed by: INTERNAL MEDICINE

## 2019-02-26 RX ADMIN — LEUPROLIDE ACETATE 22.5 MG: KIT at 16:24

## 2019-02-26 ASSESSMENT — PAIN SCALES - GENERAL: PAINLEVEL: NO PAIN (0)

## 2019-02-26 ASSESSMENT — MIFFLIN-ST. JEOR: SCORE: 1787.67

## 2019-02-26 NOTE — NURSING NOTE
Chief Complaint   Patient presents with     Blood Draw     Labs drawn via  by RN in lab. VS taken.     Kavitha Charles RN

## 2019-02-26 NOTE — PROGRESS NOTES
"MEDICAL ONCOLOGY CLINIC NOTE    REFERRING PROVIDER: Jorge Alberto Yepez MD at [facility].    REASON FOR CURRENT VISIT:   1. Evaluation while on ADT for metastatic prostate cancer.  2. Discussion of options for renal mass.     HISTORY OF PRESENT ILLNESS:  Mr. 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 His oncologic history is detailed below.  His wife, Micheline, accompanies him for this visit.    Walter started Casodex approx 1.5 weeks ago and has tolerated treatment well. No side effects at thsi time. He's had intermittent left anterior abdominal pain in the rib cage region that's been present before starting Casodex and has not changed. Also has some ache/fullness in the right (>left) costovertebral angle region. No hematuria, SOA/cough/CP etc.     ONCOLOGIC HISTORY:  1. Prostate adenocarcinoma, stage IV (M1b at diagnosis), high-volume, castration-sensitive:  - 12/13/2018: PSA found to be elevated to 9.1 ng/mL on a routine follow-up with primary care provider Dr. Naylor at [facility]. Prior PSA were 1.4 on 8/16/17, 2.4 on 6/28/16, 2.9 on 6/28/16, and as low as 0.4 on 3/26/2003.   - 1/08/2019: Consultation with Sarah Wilson CNP in Urology clinic. Repeat PSA 9.6.  - 1/16/2019: MRI prostate with contrast - \"This examination is characterized as PIRADS 5- very high probability. Clinically significant cancer is highly likely to be present. There is a large, invasive mass arising from the right peripheral zone and extending into the neurovascular bundle, seminal vesicle, and along the anterolateral right mesorectal fascia. Metastatic right external iliac lymph node. Metastatic lesion in the posterior/superior right acetabulum.\"  - 1/25/2019: CT abdomen and pelvis with IV contrast - \"1. Heterogeneous enhancing mass posteriorly in the upper pole of the right kidney measures 4.5 x 5.8 x 5.7 cm (AP by transverse by " "craniocaudal). It has a small nodular component extending posterior medially which abuts the right psoas muscle. This nodular extension measures 2.1 x 2.1 cm. Minimal stranding about the mass. No definite thrombus within the right renal vein. This renal mass is compatible with a renal cell carcinoma. A paraaortic lymph node situated immediately posterior to the left renal vein measures 1.1 cm in short axis, suspicious for metastasis. 2. A 2 cm right external iliac lymph node is also suspicious for metastases. 3. Multiple sclerotic osseous lesions suspicious for metastases. These include 0.8 and 0.6 cm sclerotic lesions laterally in the right iliac wing (images 53 and 62 respectively). Ill-defined groundglass density laterally in the right acetabulum measuring 1.7 x 1.2 cm corresponds with the lesion identified on MRI. A 0.4 cm groundglass density in the left acetabulum. Sclerotic lesion in the left femoral neck measures 0.9 x 1.6 cm (image 81). Sclerotic metastases would be more compatible with prostate metastases. 4. A 3 subcentimeter hepatic lesions are indeterminate. Metastases would be a consideration. These can be further characterized with liver MRI.\"  - 1/25/2019: NM bone scan - \"There is focal bony uptake in the left femoral neck, right acetabulum, right of midline at the S1 or L5 level of the spine, within multiple bilateral ribs, in the C7 or T1 level of the spine, and anteriorly within the skull. Findings are suspicious for metastases.\"  - 1/31/19: CT chest with contrast - \" No suspicious nodules in the chest.  Stable appearance of a 5.8 cm right renal mass concerning for renal carcinoma until proven otherwise.  Stable indeterminant subcentimeter hypodensities in the liver.  Multifocal osteoblastic metastasis including 2.5 cm lesion in the right fifth rib posteriorly and a 1.6 cm lesion in the right third rib posteriorly. No lytic lesions identified.\"  - 2/6/19: CT-guided right sclerotic fifth rib lesion " "biopsy - \"Metastatic carcinoma, consistent with prostate primary.  Immunohistochemical stains performed show the metastatic carcinoma stains positive for NKX3.1 (prostate marker), negative for STACIE 3 and PAX8, which supports the above diagnosis.\"  - 2/15/19: Started Casodex 50mg every day and consented for the biobanking protocol.   - 19: Case discussed in tumor board - recommendation for nephectomy for suspected malignant right renal mass.     2. Suspected stage II/III RCC:  - As above.     REVIEW OF SYSTEMS: 14 point ROS negative other than the symptoms noted above in the HPI.    PAST MEDICAL HISTORY:  1. Prostate cancer.  2. Hypothyroidism.  3. Allergic rhinitis.  4. Esophageal reflux disorder.  5. Dyslipidemia.  6. Glucose intolerance.  7. Obesity.     PAST SURGICAL HISTORY:   As above.    SOCIAL HISTORY:   He is  and lives with his wife, Micheline in [place]. He has 4 children and 3 sisters (2 elder brothers  from heart failure). Denies tobacco, alcohol or illicit drug use. 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    FAMILY HISTORY:   He does have a remarkable family history with several maternal relatives affected with cancer.  He has 5 siblings including one sister with skin cancer but no other malignancies.  2 of his brothers passed away at age 68 each because of smoking alcohol use and illicit drug use related heart failure.  Several maternal aunts with lymphoma, colon, liver, breast, and lung cancer, as well as a maternal uncle with lung cancer. His paternal uncle had stomach cancer.     ALLERGIES:   Allergies   Allergen Reactions     Doxycycline GI Disturbance     CURRENT MEDICATIONS:   Current Outpatient Medications:      atorvastatin (LIPITOR) 20 MG tablet, Take 20 mg by mouth daily, Disp: , Rfl:      bicalutamide (CASODEX) 50 MG tablet, Take 1 tablet (50 mg) by mouth daily, Disp: 30 tablet, Rfl: 0     calcium citrate-vitamin D (CITRACAL) " "315-250 MG-UNIT TABS per tablet, Take 650 mg by mouth, Disp: , Rfl:      cinnamon 500 MG CAPS, Take 1,500 mg by mouth daily, Disp: , Rfl:      cycloSPORINE (RESTASIS) 0.05 % ophthalmic emulsion, 1 drop by Both Eyelids route 2 times daily, Disp: , Rfl:      fluticasone (FLONASE) 50 MCG/ACT nasal spray, Spray 2 sprays in nostril every 24 hours, Disp: , Rfl:      Glucosamine-Chondroit-Vit C-Mn (GLUCOSAMINE 1500 COMPLEX) CAPS, Take 1 capsule by mouth daily, Disp: , Rfl:      levothyroxine (SYNTHROID/LEVOTHROID) 125 MCG tablet, Take 125 mcg by mouth daily, Disp: , Rfl:      Multiple Vitamins-Minerals (MULTIVITAMIN ADULT EXTRA C PO), Take 1 tablet by mouth every 24 hours, Disp: , Rfl:      Nutritional Supplements (SALMON OIL) CAPS, Take 1 capsule by mouth daily, Disp: , Rfl:      omeprazole (PRILOSEC) 20 MG DR capsule, Take 20 mg by mouth daily, Disp: , Rfl:      cetirizine (ZYRTEC) 10 MG tablet, Take 10 mg by mouth as needed, Disp: , Rfl:     PHYSICAL EXAMINATION:  Vital signs: /84 (BP Location: Right arm, Patient Position: Sitting, Cuff Size: Adult Large)   Pulse 52   Temp 98.6  F (37  C) (Oral)   Resp 18   Ht 1.816 m (5' 11.5\")   Wt 92.8 kg (204 lb 8 oz)   SpO2 97%   BMI 28.12 kg/m    ECOG performance status of 0. Fatigue 0.  GENERAL: Well-nourished healthy-appearing man in chair, no acute distress.   HEENT: No icterus, no pallor. Moist mucous membranes. Oropharynx is clear.   NECK: Supple, no JVD/LAD.  LUNGS: Clear to ausculation bilaterally, normal work of breathing.   CARDIOVASCULAR: Regular rate and rhythm, no murmurs, gallops or rubs.   ABDOMEN: Soft, nontender and nondistended, no palpable masses, bowel sounds present.  EXTREMITIES: No cyanosis, no clubbing, no edema.   NEUROLOGIC: No focal deficits, CN 2-12 intact.  LYMPH NODE EXAM: No palpable adenopathy - cervical, axillary or inguinal.     LABORATORY DATA:  Lab Test 02/26/19  1425 02/15/19  1044   WBC  --  5.1   RBC  --  5.05   HGB  --  14.7 " "  HCT  --  43.1   MCV  --  85   MCH  --  29.1   MCHC  --  34.1   RDW  --  13.7   PLT  --  215   NEUTROPHIL  --  49.3    140   POTASSIUM 3.6 3.9   CHLORIDE 108 108   CO2 26 25   ANIONGAP 6 7   GLC 87 86   BUN 16 15   CR 0.87 0.78   BETHANY 8.5 8.6   PROTTOTAL 7.6 7.4   ALBUMIN 3.8 3.6   BILITOTAL 0.3 0.5   ALKPHOS 98 91   AST 23 21   ALT 26 26     Lab Test 02/26/19  1425 02/15/19  1044   PSA 8.00* 12.30*   Testo total              222  ChromoA          475    IMAGING STUDIES:  As above.    ASSESSMENT AND PLAN: Mr. Reyes is a delightful 58-year-old gentleman with newly diagnosed metastatic castration sensitive prostate adenocarcinoma as well as a suspected stage II versus 3 renal cell carcinoma of right kidney, who is here for a second opinion.    Metastatic prostate cancer:   - Patient meets the criteria for CHAARTED \"high-volume\" metastatic hormone-sensitive prostate cancer.  - I do not have a clinical trial for this gentleman at this time, especially given the suspicion for a concurrent malignancy.  He has enrolled in the institutional biobanking study on 2/15/19 - this is for non-interventional study.   - We again reviewed systemic therapy options for newly-diagnosed metastatic hormone-sensitive prostate cancer including GNRH agonist versus antagonist by itself versus GNRH agonist/antagonist in combination with docetaxel (CHAARTED, GETUG-AFU15, STAMPEDE) or abiraterone (STAMPEDE, LATTITUDE) based on his disease volume and preference in terms of therapeutic options.   - He wants to pursue chemohormonal therapy after understanding the options.   - I have reviewed his case in tumor board and our Urologists are recommending we complete workup and mgmt of renal mass before starting chemotherapy. Patient agreeable and mgmt of renal mass is as under.   - Continue Casodex for 1 month total and then stop.  - Start Lupron 22.5mg every 3 monthly - first dose today.  - Plan for docetaxel in 2-4 months from now.   - " He understands the risks and benefits of ADT including hot flashes, mood changes and long-term cardiovascular, bone health, etc. Also understands the risks of docetaxel treatment including fatigue, nausea, vomiting, diarrhea/constipation, hand-foot syndrome, allergic reactions, myelosuppression with increased risk of infection and bleeding etc.  - Will also add antiresorptive therapy after mgmt of renal mass.     Possible kidney cancer:  - I have reviewed the available diagnostic data as it pertains to possibly renal cancer diagnosis with patient and my urology colleagues (Eric Taveras MD and Wesley Cleveland MD) in the tumor board. - including staging CT and bone scan results.  The renal mass does appear to invade through the capsule and into the psoas muscle ipsilaterally with potential lymph node enlargement in the drainage basin.    - Discussed options with patient and family. He wants to proceed with surgery instead of a biopsy then surgery.  - Urology referral placed for likely radical nephrectomy with yusuf LN dissection. Appt requested within 2 weeks.  - He understands that there is a likelihood that his kidney cancer may be curable with a radical nephrectomy.  This outcome will have implications on his prostate cancer management further outline especially if there are disparate radiographic findings in the future such as growing pulmonary nodules and sclerosing osteoblastic metastases in the bone.  It will also have applications on his eligibility for clinical trials in the future as patients with a concurrent active malignancy are almost uniformly excluded from clinical trials.     Genetics referral:  - Most striking is the finding of 2 concurrent malignancies in this relatively young gentleman with a very high risk family history of malignancies.  While biologically there is a little overlap between prostate cancer and renal cancer, this can be seen in some inherited syndromes.  If present, such an  aberration could have implications for his prostate cancer biology as well as clinical trajectory.  - Genetics referral placed today.     Return to see me in 1 month.     Patient and family were very appreciative of the care provided and in complete agreement this plan.  All questions were answered.    BILLIN.    Uday Smalls M.D.  . Professor of Medicine  Genitourinary Oncology  Division of Hematology, Oncology & Transplantation  [facility]

## 2019-02-26 NOTE — NURSING NOTE
"Oncology Rooming Note    February 26, 2019 3:31 PM   Walter Reyes is a 58 year old male who presents for:    Chief Complaint   Patient presents with     Blood Draw     Labs drawn via  by RN in lab. VS taken.     Oncology Clinic Visit     Return Prostate Ca     Initial Vitals: /84 (BP Location: Right arm, Patient Position: Sitting, Cuff Size: Adult Large)   Pulse 52   Temp 98.6  F (37  C) (Oral)   Resp 18   Ht 1.816 m (5' 11.5\")   Wt 92.8 kg (204 lb 8 oz)   SpO2 97%   BMI 28.12 kg/m   Estimated body mass index is 28.12 kg/m  as calculated from the following:    Height as of this encounter: 1.816 m (5' 11.5\").    Weight as of this encounter: 92.8 kg (204 lb 8 oz). Body surface area is 2.16 meters squared.  No Pain (0) Comment: Data Unavailable   No LMP for male patient.  Allergies reviewed: Yes  Medications reviewed: Yes    Medications: Medication refills not needed today.  Pharmacy name entered into EPIC: [facility] [place], [place] - [address] [address]    Clinical concerns: pain on left side and hasn't gotten worse or better; Kidney pain within the last week;        Mercy Valle CMA              "

## 2019-02-26 NOTE — LETTER
"2/26/2019       RE: Walter Reyes  [address] [facility] [address]     Dear Colleague,    Thank you for referring your patient, Walter Reyes, to the [facility]. Please see a copy of my visit note below.    MEDICAL ONCOLOGY CLINIC NOTE    REFERRING PROVIDER: Jorge Alberto Yepez MD at [facility].    REASON FOR CURRENT VISIT:   1. Evaluation while on ADT for metastatic prostate cancer.  2. Discussion of options for renal mass.     HISTORY OF PRESENT ILLNESS:  Mr. 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 His oncologic history is detailed below.  His wife, Micheline, accompanies him for this visit.    Walter started Casodex approx 1.5 weeks ago and has tolerated treatment well. No side effects at thsi time. He's had intermittent left anterior abdominal pain in the rib cage region that's been present before starting Casodex and has not changed. Also has some ache/fullness in the right (>left) costovertebral angle region. No hematuria, SOA/cough/CP etc.     ONCOLOGIC HISTORY:  1. Prostate adenocarcinoma, stage IV (M1b at diagnosis), high-volume, castration-sensitive:  - 12/13/2018: PSA found to be elevated to 9.1 ng/mL on a routine follow-up with primary care provider Dr. Naylor at [facility]. Prior PSA were 1.4 on 8/16/17, 2.4 on 6/28/16, 2.9 on 6/28/16, and as low as 0.4 on 3/26/2003.   - 1/08/2019: Consultation with Sarah Wilson CNP in Urology clinic. Repeat PSA 9.6.  - 1/16/2019: MRI prostate with contrast - \"This examination is characterized as PIRADS 5- very high probability. Clinically significant cancer is highly likely to be present. There is a large, invasive mass arising from the right peripheral zone and extending into the neurovascular bundle, seminal vesicle, and along the anterolateral right mesorectal fascia. Metastatic right external iliac " "lymph node. Metastatic lesion in the posterior/superior right acetabulum.\"  - 1/25/2019: CT abdomen and pelvis with IV contrast - \"1. Heterogeneous enhancing mass posteriorly in the upper pole of the right kidney measures 4.5 x 5.8 x 5.7 cm (AP by transverse by craniocaudal). It has a small nodular component extending posterior medially which abuts the right psoas muscle. This nodular extension measures 2.1 x 2.1 cm. Minimal stranding about the mass. No definite thrombus within the right renal vein. This renal mass is compatible with a renal cell carcinoma. A paraaortic lymph node situated immediately posterior to the left renal vein measures 1.1 cm in short axis, suspicious for metastasis. 2. A 2 cm right external iliac lymph node is also suspicious for metastases. 3. Multiple sclerotic osseous lesions suspicious for metastases. These include 0.8 and 0.6 cm sclerotic lesions laterally in the right iliac wing (images 53 and 62 respectively). Ill-defined groundglass density laterally in the right acetabulum measuring 1.7 x 1.2 cm corresponds with the lesion identified on MRI. A 0.4 cm groundglass density in the left acetabulum. Sclerotic lesion in the left femoral neck measures 0.9 x 1.6 cm (image 81). Sclerotic metastases would be more compatible with prostate metastases. 4. A 3 subcentimeter hepatic lesions are indeterminate. Metastases would be a consideration. These can be further characterized with liver MRI.\"  - 1/25/2019: NM bone scan - \"There is focal bony uptake in the left femoral neck, right acetabulum, right of midline at the S1 or L5 level of the spine, within multiple bilateral ribs, in the C7 or T1 level of the spine, and anteriorly within the skull. Findings are suspicious for metastases.\"  - 1/31/19: CT chest with contrast - \" No suspicious nodules in the chest.  Stable appearance of a 5.8 cm right renal mass concerning for renal carcinoma until proven otherwise.  Stable indeterminant subcentimeter " "hypodensities in the liver.  Multifocal osteoblastic metastasis including 2.5 cm lesion in the right fifth rib posteriorly and a 1.6 cm lesion in the right third rib posteriorly. No lytic lesions identified.\"  - 19: CT-guided right sclerotic fifth rib lesion biopsy - \"Metastatic carcinoma, consistent with prostate primary.  Immunohistochemical stains performed show the metastatic carcinoma stains positive for NKX3.1 (prostate marker), negative for STACIE 3 and PAX8, which supports the above diagnosis.\"  - 2/15/19: Started Casodex 50mg every day and consented for the biobanking protocol.   - 19: Case discussed in tumor board - recommendation for nephectomy for suspected malignant right renal mass.     2. Suspected stage II/III RCC:  - As above.     REVIEW OF SYSTEMS: 14 point ROS negative other than the symptoms noted above in the HPI.    PAST MEDICAL HISTORY:  1. Prostate cancer.  2. Hypothyroidism.  3. Allergic rhinitis.  4. Esophageal reflux disorder.  5. Dyslipidemia.  6. Glucose intolerance.  7. Obesity.     PAST SURGICAL HISTORY:   As above.    SOCIAL HISTORY:   He is  and lives with his wife, Micheline in [place]. He has 4 children and 3 sisters (2 elder brothers  from heart failure). Denies tobacco, alcohol or illicit drug use. 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    FAMILY HISTORY:   He does have a remarkable family history with several maternal relatives affected with cancer.  He has 5 siblings including one sister with skin cancer but no other malignancies.  2 of his brothers passed away at age 68 each because of smoking alcohol use and illicit drug use related heart failure.  Several maternal aunts with lymphoma, colon, liver, breast, and lung cancer, as well as a maternal uncle with lung cancer. His paternal uncle had stomach cancer.     ALLERGIES:   Allergies   Allergen Reactions     Doxycycline GI Disturbance     CURRENT MEDICATIONS: " "  Current Outpatient Medications:      atorvastatin (LIPITOR) 20 MG tablet, Take 20 mg by mouth daily, Disp: , Rfl:      bicalutamide (CASODEX) 50 MG tablet, Take 1 tablet (50 mg) by mouth daily, Disp: 30 tablet, Rfl: 0     calcium citrate-vitamin D (CITRACAL) 315-250 MG-UNIT TABS per tablet, Take 650 mg by mouth, Disp: , Rfl:      cinnamon 500 MG CAPS, Take 1,500 mg by mouth daily, Disp: , Rfl:      cycloSPORINE (RESTASIS) 0.05 % ophthalmic emulsion, 1 drop by Both Eyelids route 2 times daily, Disp: , Rfl:      fluticasone (FLONASE) 50 MCG/ACT nasal spray, Spray 2 sprays in nostril every 24 hours, Disp: , Rfl:      Glucosamine-Chondroit-Vit C-Mn (GLUCOSAMINE 1500 COMPLEX) CAPS, Take 1 capsule by mouth daily, Disp: , Rfl:      levothyroxine (SYNTHROID/LEVOTHROID) 125 MCG tablet, Take 125 mcg by mouth daily, Disp: , Rfl:      Multiple Vitamins-Minerals (MULTIVITAMIN ADULT EXTRA C PO), Take 1 tablet by mouth every 24 hours, Disp: , Rfl:      Nutritional Supplements (SALMON OIL) CAPS, Take 1 capsule by mouth daily, Disp: , Rfl:      omeprazole (PRILOSEC) 20 MG DR capsule, Take 20 mg by mouth daily, Disp: , Rfl:      cetirizine (ZYRTEC) 10 MG tablet, Take 10 mg by mouth as needed, Disp: , Rfl:     PHYSICAL EXAMINATION:  Vital signs: /84 (BP Location: Right arm, Patient Position: Sitting, Cuff Size: Adult Large)   Pulse 52   Temp 98.6  F (37  C) (Oral)   Resp 18   Ht 1.816 m (5' 11.5\")   Wt 92.8 kg (204 lb 8 oz)   SpO2 97%   BMI 28.12 kg/m     ECOG performance status of 0. Fatigue 0.  GENERAL: Well-nourished healthy-appearing man in chair, no acute distress.   HEENT: No icterus, no pallor. Moist mucous membranes. Oropharynx is clear.   NECK: Supple, no JVD/LAD.  LUNGS: Clear to ausculation bilaterally, normal work of breathing.   CARDIOVASCULAR: Regular rate and rhythm, no murmurs, gallops or rubs.   ABDOMEN: Soft, nontender and nondistended, no palpable masses, bowel sounds present.  EXTREMITIES: No " "cyanosis, no clubbing, no edema.   NEUROLOGIC: No focal deficits, CN 2-12 intact.  LYMPH NODE EXAM: No palpable adenopathy - cervical, axillary or inguinal.     LABORATORY DATA:  Lab Test 02/26/19  1425 02/15/19  1044   WBC  --  5.1   RBC  --  5.05   HGB  --  14.7   HCT  --  43.1   MCV  --  85   MCH  --  29.1   MCHC  --  34.1   RDW  --  13.7   PLT  --  215   NEUTROPHIL  --  49.3    140   POTASSIUM 3.6 3.9   CHLORIDE 108 108   CO2 26 25   ANIONGAP 6 7   GLC 87 86   BUN 16 15   CR 0.87 0.78   BETHANY 8.5 8.6   PROTTOTAL 7.6 7.4   ALBUMIN 3.8 3.6   BILITOTAL 0.3 0.5   ALKPHOS 98 91   AST 23 21   ALT 26 26     Lab Test 02/26/19  1425 02/15/19  1044   PSA 8.00* 12.30*   Testo total              222  ChromoA          475    IMAGING STUDIES:  As above.    ASSESSMENT AND PLAN: Mr. Reyes is a delightful 58-year-old gentleman with newly diagnosed metastatic castration sensitive prostate adenocarcinoma as well as a suspected stage II versus 3 renal cell carcinoma of right kidney, who is here for a second opinion.    Metastatic prostate cancer:   - Patient meets the criteria for CHAARTED \"high-volume\" metastatic hormone-sensitive prostate cancer.  - I do not have a clinical trial for this gentleman at this time, especially given the suspicion for a concurrent malignancy.  He has enrolled in the institutional biobanking study on 2/15/19 - this is for non-interventional study.   - We again reviewed systemic therapy options for newly-diagnosed metastatic hormone-sensitive prostate cancer including GNRH agonist versus antagonist by itself versus GNRH agonist/antagonist in combination with docetaxel (CHAARTED, GETUG-AFU15, STAMPEDE) or abiraterone (KARLA, LATTITUDE) based on his disease volume and preference in terms of therapeutic options.   - He wants to pursue chemohormonal therapy after understanding the options.   - I have reviewed his case in tumor board and our Urologists are recommending we complete workup and " mgmt of renal mass before starting chemotherapy. Patient agreeable and mgmt of renal mass is as under.   - Continue Casodex for 1 month total and then stop.  - Start Lupron 22.5mg every 3 monthly - first dose today.  - Plan for docetaxel in 2-4 months from now.   - He understands the risks and benefits of ADT including hot flashes, mood changes and long-term cardiovascular, bone health, etc. Also understands the risks of docetaxel treatment including fatigue, nausea, vomiting, diarrhea/constipation, hand-foot syndrome, allergic reactions, myelosuppression with increased risk of infection and bleeding etc.  - Will also add antiresorptive therapy after mgmt of renal mass.     Possible kidney cancer:  - I have reviewed the available diagnostic data as it pertains to possibly renal cancer diagnosis with patient and my urology colleagues (Eric Taveras MD and Wesley Cleveland MD) in the tumor board. - including staging CT and bone scan results.  The renal mass does appear to invade through the capsule and into the psoas muscle ipsilaterally with potential lymph node enlargement in the drainage basin.    - Discussed options with patient and family. He wants to proceed with surgery instead of a biopsy then surgery.  - Urology referral placed for likely radical nephrectomy with yusuf LN dissection. Appt requested within 2 weeks.  - He understands that there is a likelihood that his kidney cancer may be curable with a radical nephrectomy.  This outcome will have implications on his prostate cancer management further outline especially if there are disparate radiographic findings in the future such as growing pulmonary nodules and sclerosing osteoblastic metastases in the bone.  It will also have applications on his eligibility for clinical trials in the future as patients with a concurrent active malignancy are almost uniformly excluded from clinical trials.     Genetics referral:  - Most striking is the finding of 2  concurrent malignancies in this relatively young gentleman with a very high risk family history of malignancies.  While biologically there is a little overlap between prostate cancer and renal cancer, this can be seen in some inherited syndromes.  If present, such an aberration could have implications for his prostate cancer biology as well as clinical trajectory.  - Genetics referral placed today.     Return to see me in 1 month.     Patient and family were very appreciative of the care provided and in complete agreement this plan.  All questions were answered.    BILLIN.    Uday Smalls M.D.  . Professor of Medicine  Genitourinary Oncology  Division of Hematology, Oncology & Transplantation  [facility]

## 2019-02-26 NOTE — NURSING NOTE
Lupron injection given in Right gluteal oliva without complication.  Patient tolerated well and was discharged.    Juany Saxena CMA ([facility])

## 2019-03-01 LAB
CGA SERPL-MCNC: 654 NG/ML (ref 0–95)
TESTOST SERPL-MCNC: 462 NG/DL (ref 240–950)

## 2019-03-05 ENCOUNTER — ONCOLOGY VISIT (OUTPATIENT)
Dept: ONCOLOGY | Facility: CLINIC | Age: 59
End: 2019-03-05
Attending: INTERNAL MEDICINE
Payer: COMMERCIAL

## 2019-03-05 ENCOUNTER — HOSPITAL ENCOUNTER (OUTPATIENT)
Dept: LAB | Facility: CLINIC | Age: 59
End: 2019-03-05
Attending: INTERNAL MEDICINE
Payer: COMMERCIAL

## 2019-03-05 ENCOUNTER — HOSPITAL ENCOUNTER (OUTPATIENT)
Facility: CLINIC | Age: 59
Setting detail: SPECIMEN
End: 2019-03-05
Attending: INTERNAL MEDICINE
Payer: COMMERCIAL

## 2019-03-05 DIAGNOSIS — C61 PROSTATE CANCER METASTATIC TO BONE (H): Primary | ICD-10-CM

## 2019-03-05 DIAGNOSIS — Z80.9 FAMILY HISTORY OF CANCER: ICD-10-CM

## 2019-03-05 DIAGNOSIS — N28.89 RENAL MASS: ICD-10-CM

## 2019-03-05 DIAGNOSIS — C79.51 PROSTATE CANCER METASTATIC TO BONE (H): Primary | ICD-10-CM

## 2019-03-05 PROCEDURE — 96040 ZZH GENETIC COUNSELING, EACH 30 MINUTES: CPT | Performed by: GENETIC COUNSELOR, MS

## 2019-03-05 NOTE — PATIENT INSTRUCTIONS
Assessing Cancer Risk  Only about 5-10% of cancers are thought to be due to an inherited cancer susceptibility gene.    These families often have:    Several people with the same or related types of cancer    Cancers diagnosed at a young age (before age 50)    Individuals with more than one primary cancer    Multiple generations of the family affected with cancer    Genetic Testing  Genetic testing involves a simple blood test and will look at the genetic information in select genes for any harmful mutations that are associated with increased cancer risk.  If possible, it is recommended that the person(s) who has had cancer be tested before other family members.  That person will give us the most useful information about whether or not a specific gene is associated with the cancer in the family.     Results  There are three possible results of genetic testing:    Positive--a harmful mutation was identified    Negative--no mutation was identified    Variant of unknown significance--a variation in one of the genes was identified, but it is unclear how this impacts cancer risk in the family    Advantages and Disadvantages  There are advantages and disadvantages to genetic testing of these genes.    Advantages    May clarify your cancer risk    Can help you make medical decisions    May explain the cancers in your family    May give useful information to your family members (if you share your results)    Disadvantages    Possible negative emotional impact of learning about inherited cancer risk    Uncertainty in interpreting a negative test result in some situations    Possible genetic discrimination concerns (see below)    Inheritance   Mutations in most cancer risk genes are inherited in an autosomal dominant pattern.  This means that if a parent has a mutation, each of his or her children will have a 50% chance of inheriting that same mutation.  Therefore, each child--male or female--would have a 50%  chance of being at increased risk for developing cancer.                                              Image obtained from Genetics Home Reference, 2013     Genetic Information Nondiscrimination Act (KELLI)  KELLI is a federal law that protects individuals from health insurance or employment discrimination based on a genetic test result alone.  Although rare, there are currently no legal protections in terms of life insurance, long term care, or disability insurances.  Visit the National Human Genome Research [place] at Genome.gov/80111658 to learn more.    Reducing Cancer Risk  If a harmful mutation is found in a cancer risk gene, there may be certain screens or preventative surgeries that can be offered. This information will be discussed after genetic testing is completed. If no mutations are found on genetic testing, screening is then recommended based on personal and/or family history of cancer.     Questions to Think About Regarding Genetic Testing    What effect will the test result have on me and my relationship with my family members if I have an inherited gene mutation?  If I don t have a gene mutation?    Should I share my test results, and how will my family react to this news, which may also affect them?    Are my children ready to learn new information that may one day affect their own health?    Resources  American Cancer Society (ACS) cancer.org   National Cancer [place] (NCI) cancer.gov     Please call us if you have any questions or concerns.   Cancer Risk Management Program [phone] ([phone])  ? Emilee Lewis, MS, [facility]  [phone]  ? Ann Marie Capps, MS, [facility]  [phone]  ? Aviva Damon, MS, [facility]  [phone]  ? Bina Kong, MS, [facility]  [phone]  ? Alpa Vasquez, MS, [facility] [phone]

## 2019-03-05 NOTE — LETTER
Cancer Risk Management  Program Locations    [facility]  [facility]  Mailing Address  Cancer Risk Management Program  [facility]  [address]  [place] [address]    New patient appointments  [phone]  March 9, 2019    Walter Reyes  [address] [facility] [place] [facility] [address]      Dear Walter,    It was a pleasure meeting with you at [facility] in [place] on 3-5-19.  Here is a copy of the progress note from your recent genetic counseling visit to the Cancer Risk Management Program.  If you have any additional questions, please feel free to call.    Cancer Risk Management Program Genetic Counseling Note    3/5/2019    Referring Provider:  Dr. Uday Smalls    Presenting Information:   I met with Walter Reyes today for genetic counseling at the Cancer Risk Management Program at the [facility] in [place] to discuss his personal and family history of cancer.  He is here today to review this history, cancer screening recommendations, and available genetic testing options.  He was accompanied to clinic today by his wife.    Personal History:  Walter is a 59 year old male.   In December (at age 58), Walter had a routine screening PSA drawn by his primary care provider.   Walter reports that he had been having regular PSA screening and that his previous lab values had been around 2, but his PSA in December was about 9.  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  Additionally, on imaging done as part of this work-up, Walter was noted to have a mass in the upper pole of the right kidney; this is suspected to be a renal cancer, and so Walter is in the process of making a plan for  surgery to remove this kidney mass.  Walter has started taking Casodex and Lupron, and he states that after he has his kidney surgery, it is planned that he will have chemotherapy to treat his metastatic prostate cancer.      Walter reports that he had a colonoscopy about 6 years ago and that this colonoscopy did not note any colon polyps.  Walter reports that he had a mole removed from his back previously that was found not to show any cancer; he states he plans to follow-up with his dermatologist as recommended.  Walter states that he does not regularly do any other cancer screening at this time.     Walter reports no personal history of smoking or excessive alcohol use.  He reports that [place] his teenage years.  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    Family History: (Please see scanned pedigree for detailed family history information)    As noted above, Walter was diagnosed with metastatic prostate cancer at age 58; he also currently has a renal mass that is suspected to be a renal cancer.    Walter has two brothers and three sisters; he reports that one sister has been diagnosed with a few colon polyps, but he is not aware of any cancers in his siblings.    Walter reports that one of his nieces was diagnosed with ovarian cancer in her 40s.    Walter states that his father had two brothers and a sister.  He reports that his paternal aunt had stomach cancer in her 60s and that one of his paternal uncles had stomach cancer in his 40s.  He reports that the other paternal uncle had a daughter who was diagnosed with uterine cancer.    Walter reports that his mother had two brothers and seven sisters.  He reports that one of his maternal uncles had lung cancer (and had a history of smoking).    Her reports that one of his maternal aunts   of lymphoma in her 70s; he reports that one of her daughters had a skin cancer (thought to be sun exposure related) and another daughter with breast cancer.  Walter reports that another maternal aunt  of colon cancer in her 70s.  He states that another maternal aunt was diagnosed with breast cancer in her 40s and then a later onset lung cancer and a later onset kidney cancer.  Walter reports that another maternal aunt  of liver cancer in her 80s; he reports that one of this aunt's sons  of a brain tumor in his 50s and that another of her sons was diagnosed with stomach cancer.     Walter reports that his father's family is of [place] ancestry and that his mother's family is of [place] ancestry. He states there is no known Ashkenazi Voodoo ancestry on either side of his family.     Discussion:    We reviewed the features of sporadic, familial, and hereditary cancers. In looking at Walter's family history, it is possible that a cancer susceptibility gene is present due to the multiple cancers in the family and the multiple generations of cancer in the family.  In particular, we talked about that Walter's personal history of a diagnosis of metastatic prostate cancer age 58 is unusual and that sometimes early onset ovarian cancer and early onset breast cancer (as has been noted in his family) can be related to prostate cancer in a family.  Additionally, we talked about that given that it is suspected that Walter had a renal tumor in addition to prostate cancer, this also raises the possibility of a genetic cancer predisposition, since it is unusual to have two separate primary cancers diagnosed at age 58.    We discussed the natural history and genetics of cancer. A detailed handout regarding the information we discussed was provided to Walter at the end of our appointment today and can be found in the after visit summary. Topics included: inheritance pattern, cancer risks, cancer screening recommendations, and  also risks, benefits and limitations of testing.    Based on his personal and family history, Walter meets current National Comprehensive Cancer Network (NCCN) criteria for genetic testing of BRCA1/BRCA2, given that he has been diagnosed with metastatic prostate cancer.      We discussed that there are additional genes that could cause increased risk for prostate cancer besides BRCA1/BRCA2.  Additionally, we talked about that there are other genes associated with an increased risk for renal cancer and others associated with the other cancers reported in his family. As many of these genes present with overlapping features in a family and accurate cancer risk cannot always be established based upon the pedigree analysis alone, it would be reasonable for Walter to consider panel genetic testing to analyze multiple genes at once.    Walter was interested in pursuing genetic testing through a panel, and so we talked about the options for this testing.  After this conversation, Walter decided that he wanted to pursue a CancerNext-Expanded genetic testing panel, as this panel covers genes known to be associated with prostate cancer and some genes associated with renal cancer; he stated that he preferred an expanded panel, given the variety of cancers in his family and given that he prefers to have more information about the known hereditary cancer syndromes that could account for his personal and family history of cancer.    We talked about that the CancerNext-Expanded genetic testing panel analyzes 67 genes associated with increased risk for many different cancers:  AIP, ALK, APC, TRACY, BAP1, BARD1, BLM, BRCA1, BRCA2, BRIP1, BMPR1A, CDH1, CDK4, CDKN1B, CDKN2A, CHEK2, DICER1, EPCAM, FANCC, FH, FLCN, GALNT12, GREM1, HOXB13, MAX, MEN1, MET, MITF, MLH1, MRE11A, MSH2, MSH6, MUTYH, NBN, NF1, NF2, PALB2, PHOX2B, PMS2, POLD1, POLE, POT1, ZCRSE0C, PTCH1, PTEN, RAD50, RAD51C, RAD51D, RB1, RET, SDHA, SDHAF2, SDHB, SDHC, SDHD, SMAD4,  SMARCA4, SMARCB1, SMARCE1, STK11, SUFU, [id number], TP53, TSC1, TSC2, VHL, and XRCC2).    We discussed that many of the genes in the CancerNext-Expanded panel are associated with specific hereditary cancer syndromes and have published management guidelines:  Hereditary Breast and Ovarian Cancer syndrome (BRCA1, BRCA2), Vasquez syndrome (MLH1, MSH2, MSH6, PMS2, EPCAM), Familial Adenomatous Polyposis (APC), Hereditary Diffuse Gastric Cancer (CDH1), Familial Atypical Multiple Mole Melanoma syndrome (CDK4, CDKN2A), Juvenile Polyposis syndrome (BMPR1A, SMAD4), Cowden syndrome (PTEN), Li Fraumeni syndrome (TP53), Peutz-Jeghers syndrome (STK11), MUTYH Associated Polyposis (MUTYH), Hereditary Leiomyomatosis and Renal Cell Cancer (FH), Bsrm-Mrgl-Fnxi (FLCN), Hereditary Papillary Renal Carcinoma (MET), Hereditary Paraganglioma and Pheochromocytoma syndrome (SDHA, SDHAF2, SDHB, SDHC, SDHD), Multiple Endocrine Neoplasia type 2 (RET), Tuberous sclerosis complex (TSC1, TSC2), Von Hippel-Lindau disease (VHL), Neurofibromatosis type 1 (NF1), Neurofibromatosis type 2 (NF1), Multiple Endocrine Neoplasia type 1 (MEN1), Multiple Endocrine Neoplasia type 4 (CDKN1B), Gorlin syndrome (SUFU, PTCH1), and Fried complex (AIP, MHMDY6N). The TRACY, BRIP1, CHEK2, GREM1, NBN, PALB2, POLD1, POLE, RAD51C, and RAD51D genes are associated with increased cancer risk and have published management guidelines for certain cancers. The remaining genes (ALK, BAP1, BARD1, BLM, DICER1, FANCC, GALNT12, HOXB13, MRE11A, MAX, MET, MITF, PHOX2B, POT1, RAD50, SMARCA4,SMARCB1, SMARCE1, [id number], and XRCC2) are associated with increased cancer risk and may allow us to make medical recommendations when mutations are identified.      Walter was provided with a detailed brochure from [employer] explaining the CancerNext-Expanded testing.    Medical Management: For Walter, we reviewed that the information from genetic testing may determine:    surgery to treat Walter's  active cancer diagnosis,    additional cancer screening for which Walter may qualify (i.e., more frequent colonoscopies, more frequent dermatologic exams, etc.),    options for risk reducing surgeries Walter could consider,      and targeted chemotherapies for Walter's active cancer, or if he were to develop certain cancers in the future (i.e. immunotherapy for individuals with Vasquez syndrome, PARP inhibitors, etc.).     These recommendations and possible targeted chemotherapies will be discussed in detail once genetic testing is completed.     Plan:  1) Today, Walter elected to proceed with the CancerNext-Expanded genetic testing panel.  Therefore, consent was reviewed and signed for this testing.  His blood was then drawn to be sent to [facility] for analysis.  2) Test results should be available within 4-6 weeks.  3) Walter will either return to clinic or make a telephone visit to discuss the results when available.    Ann Marie Capps MS, [facility]  Genetic Counselor  Ph: [phone]    Face to face time: 60 minutes

## 2019-03-05 NOTE — PROGRESS NOTES
Cancer Risk Management Program Genetic Counseling Note    3/5/2019    Referring Provider:  Dr. Uday Smalls    Presenting Information:   I met with Walter Booth Melisa today for genetic counseling at the Cancer Risk Management Program at the [facility] in [place] to discuss his personal and family history of cancer.  He is here today to review this history, cancer screening recommendations, and available genetic testing options.  He was accompanied to clinic today by his wife.    Personal History:  Walter is a 59 year old male.   In December (at age 58), Walter had a routine screening PSA drawn by his primary care provider.   Walter reports that he had been having regular PSA screening and that his previous lab values had been around 2, but his PSA in December was about 9.  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  Additionally, on imaging done as part of this work-up, Walter was noted to have a mass in the upper pole of the right kidney; this is suspected to be a renal cancer, and so Walter is in the process of making a plan for surgery to remove this kidney mass.  Walter has started taking Casodex and Lupron, and he states that after he has his kidney surgery, it is planned that he will have chemotherapy to treat his metastatic prostate cancer.      Walter reports that he had a colonoscopy about 6 years ago and that this colonoscopy did not note any colon polyps.  Walter reports that he had a mole removed from his back previously that was found not to show any cancer; he states he plans to follow-up with his dermatologist as recommended.  Walter states that he does not regularly do any other cancer screening at this time.     Walter reports no personal history of smoking or excessive alcohol use.  He reports that he grew up on a farm until his teenage years.  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    Family History: (Please see scanned pedigree for detailed family history information)    As noted above, Walter was diagnosed with metastatic prostate cancer at age 58; he also currently has a renal mass that is suspected to be a renal cancer.    Walter has two brothers and three sisters; he reports that one sister has been diagnosed with a few colon polyps, but he is not aware of any cancers in his siblings.    Walter reports that one of his nieces was diagnosed with ovarian cancer in her 40s.    Walter states that his father had two brothers and a sister.  He reports that his paternal aunt had stomach cancer in her 60s and that one of his paternal uncles had stomach cancer in his 40s.  He reports that the other paternal uncle had a daughter who was diagnosed with uterine cancer.    Walter reports that his mother had two brothers and seven sisters.  He reports that one of his maternal uncles had lung cancer (and had a history of smoking).    Her reports that one of his maternal aunts  of lymphoma in her 70s; he reports that one of her daughters had a skin cancer (thought to be sun exposure related) and another daughter with breast cancer.  Walter reports that another maternal aunt  of colon cancer in her 70s.  He states that another maternal aunt was diagnosed with breast cancer in her 40s and then a later onset lung cancer and a later onset kidney cancer.  Walter reports that another maternal aunt  of liver cancer in her 80s; he reports that one of this aunt's sons  of a brain tumor in his 50s and that another of her sons was diagnosed with stomach cancer.     Walter reports that his father's family is of Turkmen ancestry and that his mother's family is of [place] ancestry. He states there is no known Ashkenazi Samaritan ancestry on either side of his family.      Discussion:    We reviewed the features of sporadic, familial, and hereditary cancers. In looking at Walter's family history, it is possible that a cancer susceptibility gene is present due to the multiple cancers in the family and the multiple generations of cancer in the family.  In particular, we talked about that Walter's personal history of a diagnosis of metastatic prostate cancer age 58 is unusual and that sometimes early onset ovarian cancer and early onset breast cancer (as has been noted in his family) can be related to prostate cancer in a family.  Additionally, we talked about that given that it is suspected that Walter had a renal tumor in addition to prostate cancer, this also raises the possibility of a genetic cancer predisposition, since it is unusual to have two separate primary cancers diagnosed at age 58.    We discussed the natural history and genetics of cancer. A detailed handout regarding the information we discussed was provided to Walter at the end of our appointment today and can be found in the after visit summary. Topics included: inheritance pattern, cancer risks, cancer screening recommendations, and also risks, benefits and limitations of testing.    Based on his personal and family history, Walter meets current National Comprehensive Cancer Network (NCCN) criteria for genetic testing of BRCA1/BRCA2, given that he has been diagnosed with metastatic prostate cancer.      We discussed that there are additional genes that could cause increased risk for prostate cancer besides BRCA1/BRCA2.  Additionally, we talked about that there are other genes associated with an increased risk for renal cancer and others associated with the other cancers reported in his family. As many of these genes present with overlapping features in a family and accurate cancer risk cannot always be established based upon the pedigree analysis alone, it would be reasonable for Walter to consider panel genetic  testing to analyze multiple genes at once.    Walter was interested in pursuing genetic testing through a panel, and so we talked about the options for this testing.  After this conversation, Walter decided that he wanted to pursue a CancerNext-Expanded genetic testing panel, as this panel covers genes known to be associated with prostate cancer and some genes associated with renal cancer; he stated that he preferred an expanded panel, given the variety of cancers in his family and given that he prefers to have more information about the known hereditary cancer syndromes that could account for his personal and family history of cancer.    We talked about that the CancerNext-Expanded genetic testing panel analyzes 67 genes associated with increased risk for many different cancers:  AIP, ALK, APC, TRACY, BAP1, BARD1, BLM, BRCA1, BRCA2, BRIP1, BMPR1A, CDH1, CDK4, CDKN1B, CDKN2A, CHEK2, DICER1, EPCAM, FANCC, FH, FLCN, GALNT12, GREM1, HOXB13, MAX, MEN1, MET, MITF, MLH1, MRE11A, MSH2, MSH6, MUTYH, NBN, NF1, NF2, PALB2, PHOX2B, PMS2, POLD1, POLE, POT1, UVFQK4O, PTCH1, PTEN, RAD50, RAD51C, RAD51D, RB1, RET, SDHA, SDHAF2, SDHB, SDHC, SDHD, SMAD4, SMARCA4, SMARCB1, SMARCE1, STK11, SUFU, [id number], TP53, TSC1, TSC2, VHL, and XRCC2).    We discussed that many of the genes in the CancerNext-Expanded panel are associated with specific hereditary cancer syndromes and have published management guidelines:  Hereditary Breast and Ovarian Cancer syndrome (BRCA1, BRCA2), Vasquez syndrome (MLH1, MSH2, MSH6, PMS2, EPCAM), Familial Adenomatous Polyposis (APC), Hereditary Diffuse Gastric Cancer (CDH1), Familial Atypical Multiple Mole Melanoma syndrome (CDK4, CDKN2A), Juvenile Polyposis syndrome (BMPR1A, SMAD4), Cowden syndrome (PTEN), Li Fraumeni syndrome (TP53), Peutz-Jeghers syndrome (STK11), MUTYH Associated Polyposis (MUTYH), Hereditary Leiomyomatosis and Renal Cell Cancer (FH), Rsed-Kvfd-Sxoc (FLCN), Hereditary Papillary Renal Carcinoma  (MET), Hereditary Paraganglioma and Pheochromocytoma syndrome (SDHA, SDHAF2, SDHB, SDHC, SDHD), Multiple Endocrine Neoplasia type 2 (RET), Tuberous sclerosis complex (TSC1, TSC2), Von Hippel-Lindau disease (VHL), Neurofibromatosis type 1 (NF1), Neurofibromatosis type 2 (NF1), Multiple Endocrine Neoplasia type 1 (MEN1), Multiple Endocrine Neoplasia type 4 (CDKN1B), Gorlin syndrome (SUFU, PTCH1), and Fried complex (AIP, [id number]). The TRACY, BRIP1, CHEK2, GREM1, NBN, PALB2, POLD1, POLE, RAD51C, and RAD51D genes are associated with increased cancer risk and have published management guidelines for certain cancers. The remaining genes (ALK, BAP1, BARD1, BLM, DICER1, FANCC, GALNT12, HOXB13, MRE11A, MAX, MET, MITF, PHOX2B, POT1, RAD50, SMARCA4,SMARCB1, SMARCE1, [id number], and XRCC2) are associated with increased cancer risk and may allow us to make medical recommendations when mutations are identified.      Walter was provided with a detailed brochure from [employer] explaining the CancerNext-Expanded testing.    Medical Management: For Walter, we reviewed that the information from genetic testing may determine:    surgery to treat Walter's active cancer diagnosis,    additional cancer screening for which Walter may qualify (i.e., more frequent colonoscopies, more frequent dermatologic exams, etc.),    options for risk reducing surgeries Walter could consider,      and targeted chemotherapies for Walter's active cancer, or if he were to develop certain cancers in the future (i.e. immunotherapy for individuals with Vasquez syndrome, PARP inhibitors, etc.).     These recommendations and possible targeted chemotherapies will be discussed in detail once genetic testing is completed.     Plan:  1) Today, Walter elected to proceed with the CancerNext-Expanded genetic testing panel.  Therefore, consent was reviewed and signed for this testing.  His blood was then drawn to be sent to [facility] [facility] [employer] for analysis.  2) Test results  should be available within 4-6 weeks.  3) Walter will either return to clinic or make a telephone visit to discuss the results when available.    Ann Marie Capps MS, [facility]  Genetic Counselor  Ph: [phone]    Face to face time: 60 minutes

## 2019-03-07 LAB — MISCELLANEOUS TEST: NORMAL

## 2019-03-11 ENCOUNTER — OFFICE VISIT (OUTPATIENT)
Dept: UROLOGY | Facility: CLINIC | Age: 59
End: 2019-03-11
Payer: COMMERCIAL

## 2019-03-11 VITALS
HEIGHT: 72 IN | WEIGHT: 240 LBS | DIASTOLIC BLOOD PRESSURE: 90 MMHG | BODY MASS INDEX: 32.51 KG/M2 | SYSTOLIC BLOOD PRESSURE: 150 MMHG | OXYGEN SATURATION: 97 % | HEART RATE: 60 BPM

## 2019-03-11 DIAGNOSIS — N28.89 RIGHT RENAL MASS: Primary | ICD-10-CM

## 2019-03-11 PROCEDURE — 99244 OFF/OP CNSLTJ NEW/EST MOD 40: CPT | Performed by: UROLOGY

## 2019-03-11 RX ORDER — CEFAZOLIN SODIUM 1 G
1 VIAL (EA) INJECTION SEE ADMIN INSTRUCTIONS
Status: CANCELLED | OUTPATIENT
Start: 2019-03-11

## 2019-03-11 ASSESSMENT — PAIN SCALES - GENERAL: PAINLEVEL: NO PAIN (0)

## 2019-03-11 ASSESSMENT — MIFFLIN-ST. JEOR: SCORE: 1951.63

## 2019-03-11 NOTE — PROGRESS NOTES
Chief Complaint:   Renal Mass         Consult or Referral:     Mr. Walter Reyes is a 59 year old male seen at the request of Dr. Smalls.         History of Present Illness:    Walter Reyes is a very pleasant 59 year old male who presents with a history of incidentally discovered right renal mass. This mass is 6 cm in size, is upper pole and posterior in location, with abutment and effacement of the renal sinus. Is is rather endophytic in nature. He has no history of flank pain or hematuria.    His history is also notable for prostate cancer, metastatic to bone. He has seen Dr. Smalls of medical oncology for this. Had PSA of 9.1 with PIRADS 5 lesion on MRI with metastatic lymph nodes and suspicious bone mets. Biopsy of the rib lesion demonstrated metastatic prostate cancer. Has since been on casodex and now also on Lupron. Plan is to continue these and likely proceed with docetaxel chemotherapy upon recovery from presumed surgery to address renal mass.    He otherwise notes no significant symptoms. Does have some mild obstructive voiding symptoms, but states he is not bothered by this. No hematuria or dysuria.    Of note, he has multiple malignancies on his mother's side of the family, and has met with genetic counseling.         Past Medical History:     Past Medical History:   Diagnosis Date     Malignant neoplasm of prostate metastatic to bone (H) 2/14/2019   Right renal mass         Past Surgical History:     Past Surgical History:   Procedure Laterality Date     CYSTOSCOPY      about 10 years ago   Cataract  Infant hernia as child         Medications     Current Outpatient Medications   Medication     atorvastatin (LIPITOR) 20 MG tablet     bicalutamide (CASODEX) 50 MG tablet     calcium citrate-vitamin D (CITRACAL) 315-250 MG-UNIT TABS per tablet     cetirizine (ZYRTEC) 10 MG tablet     cinnamon 500 MG CAPS     cycloSPORINE (RESTASIS) 0.05 % ophthalmic emulsion     fluticasone (FLONASE) 50  MCG/ACT nasal spray     Glucosamine-Chondroit-Vit C-Mn (GLUCOSAMINE 1500 COMPLEX) CAPS     levothyroxine (SYNTHROID/LEVOTHROID) 125 MCG tablet     Multiple Vitamins-Minerals (MULTIVITAMIN ADULT EXTRA C PO)     Nutritional Supplements (SALMON OIL) CAPS     omeprazole (PRILOSEC) 20 MG DR capsule     No current facility-administered medications for this visit.           Family History:   Maternal aunt with renal cell carcinoma  Mother's side of family with multiple malignancies         Social History:     Social History     Socioeconomic History     Marital status:      Spouse name: Not on file     Number of children: Not on file     Years of education: Not on file     Highest education level: Not on file   Occupational History     Not on file   Social Needs     Financial resource strain: Not on file     Food insecurity:     Worry: Not on file     Inability: Not on file     Transportation needs:     Medical: Not on file     Non-medical: Not on file   Tobacco Use     Smoking status: Never Smoker     Smokeless tobacco: Never Used   Substance and Sexual Activity     Alcohol use: Not on file     Drug use: Not on file     Sexual activity: Not on file   Lifestyle     Physical activity:     Days per week: Not on file     Minutes per session: Not on file     Stress: Not on file   Relationships     Social connections:     Talks on phone: Not on file     Gets together: Not on file     Attends Mandaeism service: Not on file     Active member of club or organization: Not on file     Attends meetings of clubs or organizations: Not on file     Relationship status: Not on file     Intimate partner violence:     Fear of current or ex partner: Not on file     Emotionally abused: Not on file     Physically abused: Not on file     Forced sexual activity: Not on file   Other Topics Concern     Parent/sibling w/ CABG, MI or angioplasty before 65F 55M? Not Asked   Social History Narrative     Not on file   Works as project  manager         Allergies:   Doxycycline         Review of Systems:  From intake questionnaire     Skin: negative  Eyes: negative  Ears/Nose/Throat: negative  Respiratory: No shortness of breath, dyspnea on exertion, cough, or hemoptysis  Cardiovascular: No chest pain or palpitations  Gastrointestinal: negative; no nausea/vomiting, constipation or diarrhea  Genitourinary: as per HPI  Musculoskeletal: negative  Neurologic: negative  Psychiatric: negative  Hematologic/Lymphatic/Immunologic: negative  Endocrine: negative         Physical Exam:     Patient is a 59 year old  male   Vitals: Blood pressure 150/90, pulse 60, height 1.829 m (6'), weight 108.9 kg (240 lb), SpO2 97 %.  Constitutional: Body mass index is 32.55 kg/m .  Alert, no acute distress, oriented, conversant  Eyes: no scleral icterus; extraocular muscles intact, moist conjunctivae  Neck: trachea midline, no thyromegaly  Ears/nose/mouth: throat/mouth:normal, good dentition  Respiratory: no respiratory distress, or pursed lip breathing  Cardiovascular: pulses strong and intact; no obvious jugular venous distension present  Gastrointestinal: soft, nontender, no organomegaly or masses,   Lymphatics: No inguinal adenopathy  Musculoskeltal: extremities normal, no peripheral edema  Skin: no suspicious lesions or rashes  Neuro: Alert, oriented, speech and mentation normal  Psych: affect and mood normal, alert and oriented to person, place and time  Gait: Normal      Labs and Pathology:    I reviewed all applicable laboratory and pathology data and went over findings with patient  Significant for   Lab Results   Component Value Date    CR 0.87 02/26/2019    CR 0.78 02/15/2019    CR 1.00 11/08/2006     PSA   Date Value Ref Range Status   02/26/2019 8.00 (H) 0 - 4 ug/L Final     Comment:     Assay Method:  Chemiluminescence using Siemens Vista analyzer   02/15/2019 12.30 (H) 0 - 4 ug/L Final     Comment:     Assay Method:  Chemiluminescence using Siemens Vista  analyzer         Imaging:    I reviewed all applicable imaging and went over findings with patient.  CT abd/pelvis 1/25/2019  IMPRESSION:    1. 5.8 cm heterogeneous enhancing solid right renal mass. This is compatible with a renal cell carcinoma.  2. 1.1 cm paraaortic lymph node is suspicious for metastasis. 2.2 cm right external iliac lymph node is also suspicious for metastases.  3. Multiple sclerotic osseous lesions suspicious for metastases. Sclerotic metastases would be more compatible with prostate metastases.   4. 3 subcentimeter hepatic lesions are indeterminate. Metastases would be a consideration. These can be further characterized with liver MRI.  5. Moderate hiatal hernia.      Outside and Past Medical records:    I spent 10 minutes reviewing outside and past medical records.         Assessment and Plan:     Assessment: 59 year old male with suspicious right renal mass, and metastatic prostate cancer. We had an extensive discussion about the significance of a localized, enhancing kidney mass.  We discussed that approximately 80% of enhancing renal masses turn out to be kidney cancer upon removal, while 20% are found to be benign.  Although certain features such as size, gender and tumor characteristics can change these risks.    We discussed the role of renal mass biopsy including the fact that renal mass biopsy is very safe with current techniques (risk of tumor seeding far below 1%).  Though renal mass biopsy is usually quite accurate 90-95% of the time, it can be inaccurate and it can be non-diagnostic.  Furthermore, the majority of patients find they just need to proceed to surgery anyway.     We discussed the options for treatment of a localized kidney mass including active surveillance, thermal ablation, and surgical extirpation.  Surgical extirpation has the best track record and close to a 99% chance of cure for T1a lesions compared to 90% cure with thermal ablation and is generally preferred.   Furthermore, thermal ablation is not optimal for larger masses or centrally located masses.     We also discussed the relative merits of partial nephrectomy vs. radical nephrectomy and the theoretic basis behind maximal nephron preservation.  There is some data suggesting there are long term survival advantages and equivalent cancer control with nephron sparing surgery.  We also discussed the advantages and disadvantages and roles of open surgery vs. laparoscopic (and Da Jamie assisted) surgery. In this setting, this mass would be best managed with radical nephrectomy, given size and location of tumor relative to renal sinus and hilar vessels.     Risks and benefits of lap radical ephrectomy were discussed in details. Risks of surgery including bleeding, infection, MI, stroke, DVT/PE, bowel injury and injury to other surrounding structures were discussed. In addition, we discussed potential for positive surgical margins, vascular injury, and potential need for conversion to an open procedure.  All questions were answered.  A written informed consent will be finalized on the morning of the procedure.  The anticipated post-operative course was explained, including an anticipated hospital stay.    Plan:  Schedule for laparoscopic right radical nephrectomy    Orders  Orders Placed This Encounter   Procedures     Cassy-Operative Worksheet  (Urology General)     Wesley Cleveland MD  Urology  [facility]  Clinic Phone [phone]

## 2019-03-11 NOTE — LETTER
RE: Walter Reyes  [id number]  [facility] [id number]     Dear Colleague,    Thank you for referring your patient, Walter Reyes, to the [facility] at [facility]. Please see a copy of my visit note below.          Chief Complaint:   Renal Mass         Consult or Referral:     Mr. Walter Reyes is a 59 year old male seen at the request of Dr. Smalls.         History of Present Illness:    Walter Reyes is a very pleasant 59 year old male who presents with a history of incidentally discovered right renal mass. This mass is 6 cm in size, is upper pole and posterior in location, with abutment and effacement of the renal sinus. Is is rather endophytic in nature. He has no history of flank pain or hematuria.    His history is also notable for prostate cancer, metastatic to bone. He has seen Dr. Smalls of medical oncology for this. Had PSA of 9.1 with PIRADS 5 lesion on MRI with metastatic lymph nodes and suspicious bone mets. Biopsy of the rib lesion demonstrated metastatic prostate cancer. Has since been on casodex and now also on Lupron. Plan is to continue these and likely proceed with docetaxel chemotherapy upon recovery from presumed surgery to address renal mass.    He otherwise notes no significant symptoms. Does have some mild obstructive voiding symptoms, but states he is not bothered by this. No hematuria or dysuria.    Of note, he has multiple malignancies on his mother's side of the family, and has met with genetic counseling.         Past Medical History:     Past Medical History:   Diagnosis Date     Malignant neoplasm of prostate metastatic to bone (H) 2/14/2019   Right renal mass         Past Surgical History:     Past Surgical History:   Procedure Laterality Date     CYSTOSCOPY      about 10 years ago   Cataract  Infant hernia as child         Medications     Current Outpatient  Medications   Medication     atorvastatin (LIPITOR) 20 MG tablet     bicalutamide (CASODEX) 50 MG tablet     calcium citrate-vitamin D (CITRACAL) 315-250 MG-UNIT TABS per tablet     cetirizine (ZYRTEC) 10 MG tablet     cinnamon 500 MG CAPS     cycloSPORINE (RESTASIS) 0.05 % ophthalmic emulsion     fluticasone (FLONASE) 50 MCG/ACT nasal spray     Glucosamine-Chondroit-Vit C-Mn (GLUCOSAMINE 1500 COMPLEX) CAPS     levothyroxine (SYNTHROID/LEVOTHROID) 125 MCG tablet     Multiple Vitamins-Minerals (MULTIVITAMIN ADULT EXTRA C PO)     Nutritional Supplements (SALMON OIL) CAPS     omeprazole (PRILOSEC) 20 MG DR capsule     No current facility-administered medications for this visit.           Family History:   Maternal aunt with renal cell carcinoma  Mother's side of family with multiple malignancies         Social History:     Social History     Socioeconomic History     Marital status:      Spouse name: Not on file     Number of children: Not on file     Years of education: Not on file     Highest education level: Not on file   Occupational History     Not on file   Social Needs     Financial resource strain: Not on file     Food insecurity:     Worry: Not on file     Inability: Not on file     Transportation needs:     Medical: Not on file     Non-medical: Not on file   Tobacco Use     Smoking status: Never Smoker     Smokeless tobacco: Never Used   Substance and Sexual Activity     Alcohol use: Not on file     Drug use: Not on file     Sexual activity: Not on file   Lifestyle     Physical activity:     Days per week: Not on file     Minutes per session: Not on file     Stress: Not on file   Relationships     Social connections:     Talks on phone: Not on file     Gets together: Not on file     Attends Restorationist service: Not on file     Active member of club or organization: Not on file     Attends meetings of clubs or organizations: Not on file     Relationship status: Not on file     Intimate partner violence:      Fear of current or ex partner: Not on file     Emotionally abused: Not on file     Physically abused: Not on file     Forced sexual activity: Not on file   Other Topics Concern     Parent/sibling w/ CABG, MI or angioplasty before 65F 55M? Not Asked   Social History Narrative     Not on file   Works as          Allergies:   Doxycycline         Physical Exam:     Patient is a 59 year old  male   Vitals: Blood pressure 150/90, pulse 60, height 1.829 m (6'), weight 108.9 kg (240 lb), SpO2 97 %.  Constitutional: Body mass index is 32.55 kg/m .  Alert, no acute distress, oriented, conversant  Eyes: no scleral icterus; extraocular muscles intact, moist conjunctivae  Neck: trachea midline, no thyromegaly  Ears/nose/mouth: throat/mouth:normal, good dentition  Respiratory: no respiratory distress, or pursed lip breathing  Cardiovascular: pulses strong and intact; no obvious jugular venous distension present  Gastrointestinal: soft, nontender, no organomegaly or masses,   Lymphatics: No inguinal adenopathy  Musculoskeltal: extremities normal, no peripheral edema  Skin: no suspicious lesions or rashes  Neuro: Alert, oriented, speech and mentation normal  Psych: affect and mood normal, alert and oriented to person, place and time  Gait: Normal      Labs and Pathology:    I reviewed all applicable laboratory and pathology data and went over findings with patient  Significant for   Lab Results   Component Value Date    CR 0.87 02/26/2019    CR 0.78 02/15/2019    CR 1.00 11/08/2006     PSA   Date Value Ref Range Status   02/26/2019 8.00 (H) 0 - 4 ug/L Final     Comment:     Assay Method:  Chemiluminescence using Siemens Vista analyzer   02/15/2019 12.30 (H) 0 - 4 ug/L Final     Comment:     Assay Method:  Chemiluminescence using Siemens Vista analyzer         Imaging:    I reviewed all applicable imaging and went over findings with patient.  CT abd/pelvis 1/25/2019  IMPRESSION:    1. 5.8 cm heterogeneous  enhancing solid right renal mass. This is compatible with a renal cell carcinoma.  2. 1.1 cm paraaortic lymph node is suspicious for metastasis. 2.2 cm right external iliac lymph node is also suspicious for metastases.  3. Multiple sclerotic osseous lesions suspicious for metastases. Sclerotic metastases would be more compatible with prostate metastases.   4. 3 subcentimeter hepatic lesions are indeterminate. Metastases would be a consideration. These can be further characterized with liver MRI.  5. Moderate hiatal hernia.      Outside and Past Medical records:    I spent 10 minutes reviewing outside and past medical records.         Assessment and Plan:     Assessment: 59 year old male with suspicious right renal mass, and metastatic prostate cancer. We had an extensive discussion about the significance of a localized, enhancing kidney mass.  We discussed that approximately 80% of enhancing renal masses turn out to be kidney cancer upon removal, while 20% are found to be benign.  Although certain features such as size, gender and tumor characteristics can change these risks.    We discussed the role of renal mass biopsy including the fact that renal mass biopsy is very safe with current techniques (risk of tumor seeding far below 1%).  Though renal mass biopsy is usually quite accurate 90-95% of the time, it can be inaccurate and it can be non-diagnostic.  Furthermore, the majority of patients find they just need to proceed to surgery anyway.     We discussed the options for treatment of a localized kidney mass including active surveillance, thermal ablation, and surgical extirpation.  Surgical extirpation has the best track record and close to a 99% chance of cure for T1a lesions compared to 90% cure with thermal ablation and is generally preferred.  Furthermore, thermal ablation is not optimal for larger masses or centrally located masses.     We also discussed the relative merits of partial nephrectomy vs.  radical nephrectomy and the theoretic basis behind maximal nephron preservation.  There is some data suggesting there are long term survival advantages and equivalent cancer control with nephron sparing surgery.  We also discussed the advantages and disadvantages and roles of open surgery vs. laparoscopic (and Da Jamie assisted) surgery. In this setting, this mass would be best managed with radical nephrectomy, given size and location of tumor relative to renal sinus and hilar vessels.     Risks and benefits of lap radical ephrectomy were discussed in details. Risks of surgery including bleeding, infection, MI, stroke, DVT/PE, bowel injury and injury to other surrounding structures were discussed. In addition, we discussed potential for positive surgical margins, vascular injury, and potential need for conversion to an open procedure.  All questions were answered.  A written informed consent will be finalized on the morning of the procedure.  The anticipated post-operative course was explained, including an anticipated hospital stay.    Plan:  Schedule for laparoscopic right radical nephrectomy    Orders  Orders Placed This Encounter   Procedures     Cassy-Operative Worksheet  (Urology General)     Wesley Cleveland MD  Urology  [facility]  Clinic Phone [phone]

## 2019-03-11 NOTE — NURSING NOTE
Chief Complaint   Patient presents with     Consult For     Checking my Right Kidney     Initial /90 (BP Location: Left arm, Patient Position: Sitting, Cuff Size: Adult Regular)   Pulse 60   Ht 1.829 m (6')   Wt 108.9 kg (240 lb)   SpO2 97%   BMI 32.55 kg/m   Estimated body mass index is 32.55 kg/m  as calculated from the following:    Height as of this encounter: 1.829 m (6').    Weight as of this encounter: 108.9 kg (240 lb).  BP completed using cuff size:regular-left.    Shayne BROOKS.  [facility] March 11, 2019 8:24 AM

## 2019-03-15 NOTE — PHARMACY-ADMISSION MEDICATION HISTORY
Medication reconciliation completed by pre-admitting.      Prior to Admission medications    Medication Sig Last Dose Taking? Auth Provider   atorvastatin (LIPITOR) 20 MG tablet Take 20 mg by mouth daily  Yes Reported, Patient   bicalutamide (CASODEX) 50 MG tablet Take 1 tablet (50 mg) by mouth daily  Yes Uday Smalls MD   calcium citrate-vitamin D (CITRACAL) 315-250 MG-UNIT TABS per tablet Take 650 mg by mouth 2 times daily   Yes Reported, Patient   cetirizine (ZYRTEC) 10 MG tablet Take 10 mg by mouth as needed  Yes Reported, Patient   cycloSPORINE (RESTASIS) 0.05 % ophthalmic emulsion Place 1 drop into both eyes 2 times daily   Yes Reported, Patient   fluticasone (FLONASE) 50 MCG/ACT nasal spray Spray 2 sprays in nostril daily as needed   Yes Reported, Patient   levothyroxine (SYNTHROID/LEVOTHROID) 125 MCG tablet Take 125 mcg by mouth daily  Yes Reported, Patient   Multiple Vitamins-Minerals (MULTIVITAMIN ADULT EXTRA C PO) Take 1 tablet by mouth every 24 hours  Yes Reported, Patient   omeprazole (PRILOSEC) 20 MG DR capsule Take 20 mg by mouth daily  Yes Reported, Patient   cinnamon 500 MG CAPS Take 500 mg by mouth daily    Reported, Patient   Glucosamine-Chondroit-Vit C-Mn (GLUCOSAMINE 1500 COMPLEX) CAPS Take 1 capsule by mouth daily   Reported, Patient   Nutritional Supplements (SALMON OIL) CAPS Take 1 capsule by mouth daily   Reported, Patient

## 2019-03-18 ENCOUNTER — HOSPITAL ENCOUNTER (OUTPATIENT)
Dept: LAB | Facility: CLINIC | Age: 59
Discharge: HOME | End: 2019-03-18
Attending: UROLOGY | Admitting: UROLOGY
Payer: COMMERCIAL

## 2019-03-18 DIAGNOSIS — Z01.812 PRE-OPERATIVE LABORATORY EXAMINATION: ICD-10-CM

## 2019-03-18 LAB
ABO + RH BLD: NORMAL
ABO + RH BLD: NORMAL
BLD GP AB SCN SERPL QL: NORMAL
BLOOD BANK CMNT PATIENT-IMP: NORMAL
SPECIMEN EXP DATE BLD: NORMAL

## 2019-03-18 PROCEDURE — 86850 RBC ANTIBODY SCREEN: CPT | Performed by: UROLOGY

## 2019-03-18 PROCEDURE — 86900 BLOOD TYPING SEROLOGIC ABO: CPT | Performed by: UROLOGY

## 2019-03-18 PROCEDURE — 36415 COLL VENOUS BLD VENIPUNCTURE: CPT | Performed by: UROLOGY

## 2019-03-18 PROCEDURE — 86901 BLOOD TYPING SEROLOGIC RH(D): CPT | Performed by: UROLOGY

## 2019-03-19 ENCOUNTER — HOSPITAL ENCOUNTER (INPATIENT)
Facility: CLINIC | Age: 59
LOS: 2 days | Discharge: HOME | End: 2019-03-21
Attending: UROLOGY | Admitting: UROLOGY
Payer: COMMERCIAL

## 2019-03-19 ENCOUNTER — ANESTHESIA EVENT (OUTPATIENT)
Dept: SURGERY | Facility: CLINIC | Age: 59
End: 2019-03-19
Payer: COMMERCIAL

## 2019-03-19 ENCOUNTER — ANESTHESIA (OUTPATIENT)
Dept: SURGERY | Facility: CLINIC | Age: 59
End: 2019-03-19
Payer: COMMERCIAL

## 2019-03-19 DIAGNOSIS — N28.89 RIGHT RENAL MASS: Primary | ICD-10-CM

## 2019-03-19 LAB
ANION GAP SERPL CALCULATED.3IONS-SCNC: 4 MMOL/L (ref 3–14)
BUN SERPL-MCNC: 13 MG/DL (ref 7–30)
CALCIUM SERPL-MCNC: 8.6 MG/DL (ref 8.5–10.1)
CHLORIDE SERPL-SCNC: 107 MMOL/L (ref 94–109)
CO2 SERPL-SCNC: 29 MMOL/L (ref 20–32)
CREAT SERPL-MCNC: 0.94 MG/DL (ref 0.66–1.25)
ERYTHROCYTE [DISTWIDTH] IN BLOOD BY AUTOMATED COUNT: 13.4 % (ref 10–15)
GFR SERPL CREATININE-BSD FRML MDRD: 90 ML/MIN/{1.73_M2}
GLUCOSE SERPL-MCNC: 121 MG/DL (ref 70–99)
HCT VFR BLD AUTO: 44.2 % (ref 40–53)
HGB BLD-MCNC: 14 G/DL (ref 13.3–17.7)
MCH RBC QN AUTO: 27.9 PG (ref 26.5–33)
MCHC RBC AUTO-ENTMCNC: 31.7 G/DL (ref 31.5–36.5)
MCV RBC AUTO: 88 FL (ref 78–100)
PLATELET # BLD AUTO: 167 10E9/L (ref 150–450)
POTASSIUM SERPL-SCNC: 4.4 MMOL/L (ref 3.4–5.3)
RBC # BLD AUTO: 5.01 10E12/L (ref 4.4–5.9)
SODIUM SERPL-SCNC: 140 MMOL/L (ref 133–144)
WBC # BLD AUTO: 12 10E9/L (ref 4–11)

## 2019-03-19 PROCEDURE — 25800030 ZZH RX IP 258 OP 636: Performed by: ANESTHESIOLOGY

## 2019-03-19 PROCEDURE — 71000012 ZZH RECOVERY PHASE 1 LEVEL 1 FIRST HR: Performed by: UROLOGY

## 2019-03-19 PROCEDURE — 25000128 H RX IP 250 OP 636: Performed by: UROLOGY

## 2019-03-19 PROCEDURE — 27210794 ZZH OR GENERAL SUPPLY STERILE: Performed by: UROLOGY

## 2019-03-19 PROCEDURE — 88307 TISSUE EXAM BY PATHOLOGIST: CPT | Performed by: UROLOGY

## 2019-03-19 PROCEDURE — 80048 BASIC METABOLIC PNL TOTAL CA: CPT | Performed by: UROLOGY

## 2019-03-19 PROCEDURE — 36000063 ZZH SURGERY LEVEL 4 EA 15 ADDTL MIN: Performed by: UROLOGY

## 2019-03-19 PROCEDURE — 25800030 ZZH RX IP 258 OP 636: Performed by: UROLOGY

## 2019-03-19 PROCEDURE — 50545 LAPARO RADICAL NEPHRECTOMY: CPT | Mod: RT | Performed by: UROLOGY

## 2019-03-19 PROCEDURE — 25000128 H RX IP 250 OP 636: Performed by: ANESTHESIOLOGY

## 2019-03-19 PROCEDURE — 40000306 ZZH STATISTIC PRE PROC ASSESS II: Performed by: UROLOGY

## 2019-03-19 PROCEDURE — 88307 TISSUE EXAM BY PATHOLOGIST: CPT | Mod: 26 | Performed by: UROLOGY

## 2019-03-19 PROCEDURE — 12000000 ZZH R&B MED SURG/OB

## 2019-03-19 PROCEDURE — 71000013 ZZH RECOVERY PHASE 1 LEVEL 1 EA ADDTL HR: Performed by: UROLOGY

## 2019-03-19 PROCEDURE — 37000009 ZZH ANESTHESIA TECHNICAL FEE, EACH ADDTL 15 MIN: Performed by: UROLOGY

## 2019-03-19 PROCEDURE — 25000128 H RX IP 250 OP 636: Performed by: NURSE ANESTHETIST, CERTIFIED REGISTERED

## 2019-03-19 PROCEDURE — 36415 COLL VENOUS BLD VENIPUNCTURE: CPT | Performed by: UROLOGY

## 2019-03-19 PROCEDURE — 37000008 ZZH ANESTHESIA TECHNICAL FEE, 1ST 30 MIN: Performed by: UROLOGY

## 2019-03-19 PROCEDURE — 25800030 ZZH RX IP 258 OP 636: Performed by: NURSE ANESTHETIST, CERTIFIED REGISTERED

## 2019-03-19 PROCEDURE — 36000093 ZZH SURGERY LEVEL 4 1ST 30 MIN: Performed by: UROLOGY

## 2019-03-19 PROCEDURE — 93010 ELECTROCARDIOGRAM REPORT: CPT | Performed by: INTERNAL MEDICINE

## 2019-03-19 PROCEDURE — 85027 COMPLETE CBC AUTOMATED: CPT | Performed by: UROLOGY

## 2019-03-19 PROCEDURE — 0TT04ZZ RESECTION OF RIGHT KIDNEY, PERCUTANEOUS ENDOSCOPIC APPROACH: ICD-10-PCS | Performed by: UROLOGY

## 2019-03-19 PROCEDURE — 25000125 ZZHC RX 250: Performed by: NURSE ANESTHETIST, CERTIFIED REGISTERED

## 2019-03-19 PROCEDURE — 25000132 ZZH RX MED GY IP 250 OP 250 PS 637: Performed by: UROLOGY

## 2019-03-19 RX ORDER — LEVOTHYROXINE SODIUM 125 UG/1
125 TABLET ORAL DAILY
Status: DISCONTINUED | OUTPATIENT
Start: 2019-03-19 | End: 2019-03-21 | Stop reason: HOSPADM

## 2019-03-19 RX ORDER — AMOXICILLIN 250 MG
1 CAPSULE ORAL 2 TIMES DAILY
Status: DISCONTINUED | OUTPATIENT
Start: 2019-03-19 | End: 2019-03-21 | Stop reason: HOSPADM

## 2019-03-19 RX ORDER — AMOXICILLIN 250 MG
2 CAPSULE ORAL 2 TIMES DAILY
Status: DISCONTINUED | OUTPATIENT
Start: 2019-03-19 | End: 2019-03-21 | Stop reason: HOSPADM

## 2019-03-19 RX ORDER — SODIUM CHLORIDE, SODIUM LACTATE, POTASSIUM CHLORIDE, CALCIUM CHLORIDE 600; 310; 30; 20 MG/100ML; MG/100ML; MG/100ML; MG/100ML
INJECTION, SOLUTION INTRAVENOUS CONTINUOUS
Status: DISCONTINUED | OUTPATIENT
Start: 2019-03-19 | End: 2019-03-19 | Stop reason: HOSPADM

## 2019-03-19 RX ORDER — PROPOFOL 10 MG/ML
INJECTION, EMULSION INTRAVENOUS CONTINUOUS PRN
Status: DISCONTINUED | OUTPATIENT
Start: 2019-03-19 | End: 2019-03-19

## 2019-03-19 RX ORDER — DEXAMETHASONE SODIUM PHOSPHATE 4 MG/ML
INJECTION, SOLUTION INTRA-ARTICULAR; INTRALESIONAL; INTRAMUSCULAR; INTRAVENOUS; SOFT TISSUE PRN
Status: DISCONTINUED | OUTPATIENT
Start: 2019-03-19 | End: 2019-03-19

## 2019-03-19 RX ORDER — HYDROMORPHONE HYDROCHLORIDE 1 MG/ML
.3-.5 INJECTION, SOLUTION INTRAMUSCULAR; INTRAVENOUS; SUBCUTANEOUS
Status: DISCONTINUED | OUTPATIENT
Start: 2019-03-19 | End: 2019-03-21 | Stop reason: HOSPADM

## 2019-03-19 RX ORDER — PROPOFOL 10 MG/ML
INJECTION, EMULSION INTRAVENOUS PRN
Status: DISCONTINUED | OUTPATIENT
Start: 2019-03-19 | End: 2019-03-19

## 2019-03-19 RX ORDER — LABETALOL 20 MG/4 ML (5 MG/ML) INTRAVENOUS SYRINGE
10
Status: DISCONTINUED | OUTPATIENT
Start: 2019-03-19 | End: 2019-03-19 | Stop reason: HOSPADM

## 2019-03-19 RX ORDER — ATORVASTATIN CALCIUM 20 MG/1
20 TABLET, FILM COATED ORAL DAILY
Status: DISCONTINUED | OUTPATIENT
Start: 2019-03-19 | End: 2019-03-21 | Stop reason: HOSPADM

## 2019-03-19 RX ORDER — CALCIUM CARBONATE 500 MG/1
1000 TABLET, CHEWABLE ORAL 4 TIMES DAILY PRN
Status: DISCONTINUED | OUTPATIENT
Start: 2019-03-19 | End: 2019-03-21 | Stop reason: HOSPADM

## 2019-03-19 RX ORDER — LIDOCAINE 40 MG/G
CREAM TOPICAL
Status: DISCONTINUED | OUTPATIENT
Start: 2019-03-19 | End: 2019-03-19 | Stop reason: HOSPADM

## 2019-03-19 RX ORDER — ONDANSETRON 2 MG/ML
4 INJECTION INTRAMUSCULAR; INTRAVENOUS EVERY 6 HOURS PRN
Status: DISCONTINUED | OUTPATIENT
Start: 2019-03-19 | End: 2019-03-21 | Stop reason: HOSPADM

## 2019-03-19 RX ORDER — ONDANSETRON 2 MG/ML
4 INJECTION INTRAMUSCULAR; INTRAVENOUS EVERY 30 MIN PRN
Status: DISCONTINUED | OUTPATIENT
Start: 2019-03-19 | End: 2019-03-19 | Stop reason: HOSPADM

## 2019-03-19 RX ORDER — FENTANYL CITRATE 50 UG/ML
25-50 INJECTION, SOLUTION INTRAMUSCULAR; INTRAVENOUS
Status: DISCONTINUED | OUTPATIENT
Start: 2019-03-19 | End: 2019-03-19 | Stop reason: HOSPADM

## 2019-03-19 RX ORDER — CEFAZOLIN SODIUM 2 G/100ML
2 INJECTION, SOLUTION INTRAVENOUS
Status: COMPLETED | OUTPATIENT
Start: 2019-03-19 | End: 2019-03-19

## 2019-03-19 RX ORDER — ACETAMINOPHEN 325 MG/1
975 TABLET ORAL EVERY 8 HOURS
Status: DISCONTINUED | OUTPATIENT
Start: 2019-03-19 | End: 2019-03-21 | Stop reason: HOSPADM

## 2019-03-19 RX ORDER — SODIUM CHLORIDE 9 MG/ML
INJECTION, SOLUTION INTRAVENOUS CONTINUOUS
Status: DISCONTINUED | OUTPATIENT
Start: 2019-03-19 | End: 2019-03-21

## 2019-03-19 RX ORDER — FLUTICASONE PROPIONATE 50 MCG
2 SPRAY, SUSPENSION (ML) NASAL DAILY PRN
Status: DISCONTINUED | OUTPATIENT
Start: 2019-03-19 | End: 2019-03-21 | Stop reason: HOSPADM

## 2019-03-19 RX ORDER — KETAMINE HYDROCHLORIDE 10 MG/ML
INJECTION INTRAMUSCULAR; INTRAVENOUS PRN
Status: DISCONTINUED | OUTPATIENT
Start: 2019-03-19 | End: 2019-03-19

## 2019-03-19 RX ORDER — HYDROMORPHONE HYDROCHLORIDE 1 MG/ML
.3-.5 INJECTION, SOLUTION INTRAMUSCULAR; INTRAVENOUS; SUBCUTANEOUS EVERY 5 MIN PRN
Status: DISCONTINUED | OUTPATIENT
Start: 2019-03-19 | End: 2019-03-19 | Stop reason: HOSPADM

## 2019-03-19 RX ORDER — EPHEDRINE SULFATE 50 MG/ML
INJECTION, SOLUTION INTRAMUSCULAR; INTRAVENOUS; SUBCUTANEOUS PRN
Status: DISCONTINUED | OUTPATIENT
Start: 2019-03-19 | End: 2019-03-19

## 2019-03-19 RX ORDER — HYDRALAZINE HYDROCHLORIDE 20 MG/ML
2.5-5 INJECTION INTRAMUSCULAR; INTRAVENOUS EVERY 10 MIN PRN
Status: DISCONTINUED | OUTPATIENT
Start: 2019-03-19 | End: 2019-03-19 | Stop reason: HOSPADM

## 2019-03-19 RX ORDER — BUPIVACAINE HYDROCHLORIDE 2.5 MG/ML
INJECTION, SOLUTION INFILTRATION; PERINEURAL PRN
Status: DISCONTINUED | OUTPATIENT
Start: 2019-03-19 | End: 2019-03-19 | Stop reason: HOSPADM

## 2019-03-19 RX ORDER — ONDANSETRON 4 MG/1
4 TABLET, ORALLY DISINTEGRATING ORAL EVERY 6 HOURS PRN
Status: DISCONTINUED | OUTPATIENT
Start: 2019-03-19 | End: 2019-03-21 | Stop reason: HOSPADM

## 2019-03-19 RX ORDER — HEPARIN SODIUM 5000 [USP'U]/.5ML
5000 INJECTION, SOLUTION INTRAVENOUS; SUBCUTANEOUS EVERY 8 HOURS
Status: DISCONTINUED | OUTPATIENT
Start: 2019-03-20 | End: 2019-03-21 | Stop reason: HOSPADM

## 2019-03-19 RX ORDER — FENTANYL CITRATE 50 UG/ML
INJECTION, SOLUTION INTRAMUSCULAR; INTRAVENOUS PRN
Status: DISCONTINUED | OUTPATIENT
Start: 2019-03-19 | End: 2019-03-19

## 2019-03-19 RX ORDER — NALOXONE HYDROCHLORIDE 0.4 MG/ML
.1-.4 INJECTION, SOLUTION INTRAMUSCULAR; INTRAVENOUS; SUBCUTANEOUS
Status: DISCONTINUED | OUTPATIENT
Start: 2019-03-19 | End: 2019-03-19

## 2019-03-19 RX ORDER — NALOXONE HYDROCHLORIDE 0.4 MG/ML
.1-.4 INJECTION, SOLUTION INTRAMUSCULAR; INTRAVENOUS; SUBCUTANEOUS
Status: DISCONTINUED | OUTPATIENT
Start: 2019-03-19 | End: 2019-03-21 | Stop reason: HOSPADM

## 2019-03-19 RX ORDER — LIDOCAINE HYDROCHLORIDE 10 MG/ML
INJECTION, SOLUTION INFILTRATION; PERINEURAL PRN
Status: DISCONTINUED | OUTPATIENT
Start: 2019-03-19 | End: 2019-03-19

## 2019-03-19 RX ORDER — OXYCODONE HYDROCHLORIDE 5 MG/1
5-10 TABLET ORAL
Status: DISCONTINUED | OUTPATIENT
Start: 2019-03-19 | End: 2019-03-21 | Stop reason: HOSPADM

## 2019-03-19 RX ORDER — PROCHLORPERAZINE MALEATE 10 MG
10 TABLET ORAL EVERY 6 HOURS PRN
Status: DISCONTINUED | OUTPATIENT
Start: 2019-03-19 | End: 2019-03-21 | Stop reason: HOSPADM

## 2019-03-19 RX ORDER — CEFAZOLIN SODIUM 1 G/3ML
1 INJECTION, POWDER, FOR SOLUTION INTRAMUSCULAR; INTRAVENOUS SEE ADMIN INSTRUCTIONS
Status: DISCONTINUED | OUTPATIENT
Start: 2019-03-19 | End: 2019-03-19 | Stop reason: HOSPADM

## 2019-03-19 RX ORDER — ONDANSETRON 2 MG/ML
INJECTION INTRAMUSCULAR; INTRAVENOUS PRN
Status: DISCONTINUED | OUTPATIENT
Start: 2019-03-19 | End: 2019-03-19

## 2019-03-19 RX ORDER — ACETAMINOPHEN 325 MG/1
650 TABLET ORAL EVERY 4 HOURS PRN
Status: DISCONTINUED | OUTPATIENT
Start: 2019-03-22 | End: 2019-03-21 | Stop reason: HOSPADM

## 2019-03-19 RX ORDER — ONDANSETRON 4 MG/1
4 TABLET, ORALLY DISINTEGRATING ORAL EVERY 30 MIN PRN
Status: DISCONTINUED | OUTPATIENT
Start: 2019-03-19 | End: 2019-03-19 | Stop reason: HOSPADM

## 2019-03-19 RX ORDER — GLYCOPYRROLATE 0.2 MG/ML
INJECTION, SOLUTION INTRAMUSCULAR; INTRAVENOUS PRN
Status: DISCONTINUED | OUTPATIENT
Start: 2019-03-19 | End: 2019-03-19

## 2019-03-19 RX ORDER — LIDOCAINE 40 MG/G
CREAM TOPICAL
Status: DISCONTINUED | OUTPATIENT
Start: 2019-03-19 | End: 2019-03-21 | Stop reason: HOSPADM

## 2019-03-19 RX ADMIN — SENNOSIDES AND DOCUSATE SODIUM 1 TABLET: 8.6; 5 TABLET ORAL at 22:18

## 2019-03-19 RX ADMIN — ACETAMINOPHEN 975 MG: 325 TABLET, FILM COATED ORAL at 19:16

## 2019-03-19 RX ADMIN — ROCURONIUM BROMIDE 20 MG: 10 INJECTION INTRAVENOUS at 13:31

## 2019-03-19 RX ADMIN — PROPOFOL 50 MCG/KG/MIN: 10 INJECTION, EMULSION INTRAVENOUS at 13:29

## 2019-03-19 RX ADMIN — HYDRALAZINE HYDROCHLORIDE 5 MG: 20 INJECTION INTRAMUSCULAR; INTRAVENOUS at 16:16

## 2019-03-19 RX ADMIN — FENTANYL CITRATE 50 MCG: 50 INJECTION INTRAMUSCULAR; INTRAVENOUS at 15:39

## 2019-03-19 RX ADMIN — PROPOFOL 200 MG: 10 INJECTION, EMULSION INTRAVENOUS at 12:52

## 2019-03-19 RX ADMIN — CEFAZOLIN SODIUM 2 G: 2 INJECTION, SOLUTION INTRAVENOUS at 12:45

## 2019-03-19 RX ADMIN — Medication 10 MG: at 13:40

## 2019-03-19 RX ADMIN — ONDANSETRON 4 MG: 2 INJECTION INTRAMUSCULAR; INTRAVENOUS at 15:11

## 2019-03-19 RX ADMIN — PHENYLEPHRINE HYDROCHLORIDE 100 MCG: 10 INJECTION, SOLUTION INTRAMUSCULAR; INTRAVENOUS; SUBCUTANEOUS at 13:40

## 2019-03-19 RX ADMIN — ROCURONIUM BROMIDE 15 MG: 10 INJECTION INTRAVENOUS at 14:29

## 2019-03-19 RX ADMIN — CEFAZOLIN 1 G: 1 INJECTION, POWDER, FOR SOLUTION INTRAMUSCULAR; INTRAVENOUS at 14:50

## 2019-03-19 RX ADMIN — HYDROMORPHONE HYDROCHLORIDE 0.5 MG: 1 INJECTION, SOLUTION INTRAMUSCULAR; INTRAVENOUS; SUBCUTANEOUS at 17:59

## 2019-03-19 RX ADMIN — DEXAMETHASONE SODIUM PHOSPHATE 4 MG: 4 INJECTION, SOLUTION INTRA-ARTICULAR; INTRALESIONAL; INTRAMUSCULAR; INTRAVENOUS; SOFT TISSUE at 12:52

## 2019-03-19 RX ADMIN — HYDROMORPHONE HYDROCHLORIDE 0.5 MG: 1 INJECTION, SOLUTION INTRAMUSCULAR; INTRAVENOUS; SUBCUTANEOUS at 20:00

## 2019-03-19 RX ADMIN — SODIUM CHLORIDE, POTASSIUM CHLORIDE, SODIUM LACTATE AND CALCIUM CHLORIDE: 600; 310; 30; 20 INJECTION, SOLUTION INTRAVENOUS at 13:40

## 2019-03-19 RX ADMIN — DEXTRAN 70, AND HYPROMELLOSE 2910 1 DROP: 1; 3 SOLUTION/ DROPS OPHTHALMIC at 22:17

## 2019-03-19 RX ADMIN — FENTANYL CITRATE 50 MCG: 50 INJECTION INTRAMUSCULAR; INTRAVENOUS at 15:56

## 2019-03-19 RX ADMIN — ROCURONIUM BROMIDE 50 MG: 10 INJECTION INTRAVENOUS at 12:52

## 2019-03-19 RX ADMIN — FENTANYL CITRATE 50 MCG: 50 INJECTION INTRAMUSCULAR; INTRAVENOUS at 16:43

## 2019-03-19 RX ADMIN — SODIUM CHLORIDE, POTASSIUM CHLORIDE, SODIUM LACTATE AND CALCIUM CHLORIDE: 600; 310; 30; 20 INJECTION, SOLUTION INTRAVENOUS at 14:42

## 2019-03-19 RX ADMIN — HYDROMORPHONE HYDROCHLORIDE 0.5 MG: 1 INJECTION, SOLUTION INTRAMUSCULAR; INTRAVENOUS; SUBCUTANEOUS at 16:17

## 2019-03-19 RX ADMIN — GLYCOPYRROLATE 0.2 MG: 0.2 INJECTION, SOLUTION INTRAMUSCULAR; INTRAVENOUS at 12:52

## 2019-03-19 RX ADMIN — SODIUM CHLORIDE, POTASSIUM CHLORIDE, SODIUM LACTATE AND CALCIUM CHLORIDE: 600; 310; 30; 20 INJECTION, SOLUTION INTRAVENOUS at 12:25

## 2019-03-19 RX ADMIN — FENTANYL CITRATE 50 MCG: 50 INJECTION INTRAMUSCULAR; INTRAVENOUS at 16:25

## 2019-03-19 RX ADMIN — LIDOCAINE HYDROCHLORIDE 40 MG: 10 INJECTION, SOLUTION INFILTRATION; PERINEURAL at 12:52

## 2019-03-19 RX ADMIN — GLYCOPYRROLATE 0.2 MG: 0.2 INJECTION, SOLUTION INTRAMUSCULAR; INTRAVENOUS at 13:17

## 2019-03-19 RX ADMIN — FENTANYL CITRATE 100 MCG: 50 INJECTION, SOLUTION INTRAMUSCULAR; INTRAVENOUS at 13:28

## 2019-03-19 RX ADMIN — FENTANYL CITRATE 150 MCG: 50 INJECTION, SOLUTION INTRAMUSCULAR; INTRAVENOUS at 12:52

## 2019-03-19 RX ADMIN — PROPOFOL 30 MG: 10 INJECTION, EMULSION INTRAVENOUS at 13:28

## 2019-03-19 RX ADMIN — Medication 10 MG: at 14:15

## 2019-03-19 RX ADMIN — HYDROMORPHONE HYDROCHLORIDE 0.5 MG: 1 INJECTION, SOLUTION INTRAMUSCULAR; INTRAVENOUS; SUBCUTANEOUS at 15:58

## 2019-03-19 RX ADMIN — SODIUM CHLORIDE: 9 INJECTION, SOLUTION INTRAVENOUS at 17:59

## 2019-03-19 RX ADMIN — ROCURONIUM BROMIDE 30 MG: 10 INJECTION INTRAVENOUS at 13:23

## 2019-03-19 RX ADMIN — OYSTER SHELL CALCIUM WITH VITAMIN D 1 TABLET: 500; 200 TABLET, FILM COATED ORAL at 22:18

## 2019-03-19 RX ADMIN — MIDAZOLAM 2 MG: 1 INJECTION INTRAMUSCULAR; INTRAVENOUS at 12:45

## 2019-03-19 RX ADMIN — SODIUM CHLORIDE, POTASSIUM CHLORIDE, SODIUM LACTATE AND CALCIUM CHLORIDE: 600; 310; 30; 20 INJECTION, SOLUTION INTRAVENOUS at 16:17

## 2019-03-19 RX ADMIN — HYDRALAZINE HYDROCHLORIDE 5 MG: 20 INJECTION INTRAMUSCULAR; INTRAVENOUS at 16:03

## 2019-03-19 RX ADMIN — HYDROMORPHONE HYDROCHLORIDE 0.5 MG: 1 INJECTION, SOLUTION INTRAMUSCULAR; INTRAVENOUS; SUBCUTANEOUS at 22:18

## 2019-03-19 RX ADMIN — Medication 50 MG: at 13:08

## 2019-03-19 RX ADMIN — Medication 5 MG: at 13:21

## 2019-03-19 RX ADMIN — HYDRALAZINE HYDROCHLORIDE 5 MG: 20 INJECTION INTRAMUSCULAR; INTRAVENOUS at 15:50

## 2019-03-19 RX ADMIN — PHENYLEPHRINE HYDROCHLORIDE 100 MCG: 10 INJECTION, SOLUTION INTRAMUSCULAR; INTRAVENOUS; SUBCUTANEOUS at 13:42

## 2019-03-19 ASSESSMENT — MIFFLIN-ST. JEOR: SCORE: 1933.62

## 2019-03-19 ASSESSMENT — ACTIVITIES OF DAILY LIVING (ADL): ADLS_ACUITY_SCORE: 12

## 2019-03-19 NOTE — ANESTHESIA PREPROCEDURE EVALUATION
Anesthesia Pre-Procedure Evaluation    Patient: Walter Reyes   MRN: [id number] : 1960          Preoperative Diagnosis: Right renal mass    Procedure(s):  Right laparoscopic radical nephrectomy, possible open    Past Medical History:   Diagnosis Date     Cancer of kidney, right (H)      Malignant neoplasm of prostate metastatic to bone (H) 2019     Thyroid disease      Past Surgical History:   Procedure Laterality Date     CYSTOSCOPY      about 10 years ago     Anesthesia Evaluation     . Pt has had prior anesthetic.            ROS/MED HX    ENT/Pulmonary:  - neg pulmonary ROS     Neurologic:  - neg neurologic ROS     Cardiovascular:     (+) Dyslipidemia, ----. : . . . :. .       METS/Exercise Tolerance:     Hematologic:  - neg hematologic  ROS       Musculoskeletal:  - neg musculoskeletal ROS       GI/Hepatic:     (+) GERD       Renal/Genitourinary:     (+) Other Renal/ Genitourinary, kidney cancer      Endo:     (+) thyroid problem hypothyroidism, Obesity, .      Psychiatric:  - neg psychiatric ROS       Infectious Disease:         Malignancy:   (+) Malignancy History of Prostate and Other          Other:                          Physical Exam  Normal systems: cardiovascular and pulmonary    Airway   Mallampati: II  TM distance: >3 FB  Neck ROM: full    Dental     Cardiovascular       Pulmonary             Lab Results   Component Value Date    WBC 6.5 2019    HGB 14.1 2019    HCT 45.3 2019     2019     2019    POTASSIUM 3.6 2019    CHLORIDE 108 2019    CO2 26 2019    BUN 16 2019    CR 0.87 2019    GLC 87 2019    BETHANY 8.5 2019    ALBUMIN 3.8 2019    PROTTOTAL 7.6 2019    ALT 26 2019    AST 23 2019    ALKPHOS 98 2019    BILITOTAL 0.3 2019       Preop Vitals  BP Readings from Last 3 Encounters:   19 150/90   19 138/84   19 146/82    Pulse Readings from Last 3  Encounters:   03/11/19 60   02/26/19 52   02/12/19 53      Resp Readings from Last 3 Encounters:   02/26/19 18   02/12/19 16    SpO2 Readings from Last 3 Encounters:   03/11/19 97%   02/26/19 97%   02/12/19 96%      Temp Readings from Last 1 Encounters:   02/26/19 98.6  F (37  C) (Oral)    Ht Readings from Last 1 Encounters:   03/11/19 1.829 m (6')      Wt Readings from Last 1 Encounters:   03/11/19 108.9 kg (240 lb)    Estimated body mass index is 32.55 kg/m  as calculated from the following:    Height as of 3/11/19: 1.829 m (6').    Weight as of 3/11/19: 108.9 kg (240 lb).       Anesthesia Plan      History & Physical Review  History and physical reviewed and following examination; no interval change.    ASA Status:  3 .    NPO Status:  > 8 hours    Plan for General and ETT with Intravenous and Propofol induction. Maintenance will be Balanced.    PONV prophylaxis:  Ondansetron (or other 5HT-3) and Dexamethasone or Solumedrol       Postoperative Care  Postoperative pain management:  IV analgesics.      Consents  Anesthetic plan, risks, benefits and alternatives discussed with:  Patient.  Use of blood products discussed: Yes.   Use of blood products discussed with Patient.  Consented to blood products.  .                 Tommie Jean-Baptiste MD                    .

## 2019-03-19 NOTE — OP NOTE
OPERATIVE REPORT  3/19/2019     PREOPERATIVE DIAGNOSIS:  Right renal mass    POSTOPERATIVE DIAGNOSIS:  Right renal mass    PROCEDURES PERFORMED:   1. Right laparoscopic radical nephrectomy    STAFF SURGEON: Dr. Wesley Cleveland MD   ASSISTANT(S): Virginia Lee  ANESTHESIA: General    ESTIMATED BLOOD LOSS: 50  cc  DRAINS/TUBES: 16Fr coreas catheter    IV FLUIDS: Please see anesthesia record  COMPLICATIONS: None.   SPECIMEN: Right kidney  SIGNIFICANT FINDINGS:   1.  Gross examination of the kidney showed renal mass without significant involvement of surrounding organs.    BRIEF OPERATIVE INDICATIONS: Walter Reyes is a(n) 59 year old male with incidental finding of a right renal mass. After a discussion of all risks, benefits, and alternatives, the patient elected to proceed with the above stated procedure.     OPERATIVE DETAILS: Informed consent was obtained and the patient was brought to the operating room where general anesthesia was induced. She was given appropriate preoperative antibiotics. She was initially positioned modified flank position where she was prepped and draped in the standard sterile fashion.  We were careful to pad all pressure points.  We then performed a timeout, verifying the correct patient's site and procedure to be performed.     We began by inserting the Veress needle into the abdomen. After confirmatory drop test, the abdomen was insufflated. We then proceeded to place two 12 mm non-dilating ports and a 5 mm port to triangulate the kidney. An additional 5 mm port was placed for a liver retractor. We mobilized the colon from  the lateral aspect of the abdominal wall and reflect it medially. Gerota's fascia was then opened and the lower pole of the kidney mobilized. The gonadal vein was identified, clipped, and divided. The ureter was also identified and divided with Hem-o-lock clips. We then moved cephalad to the renal hilum. We then identified the following vessels:  Renal Vein: 1  and Renal Artery: 1.  These were carefully dissected and freed from surrounding strctures. We divided the renal artery with a 45 mm Endo-SOFIA stapler. A second load was used for the vein. We then continued to march up towards the upper pole where the remaining attachments were divided using cautery.  We were able to spare the adrenal gland.  We then continued to divide the lateral attachments until the kidney was completely free. At this point it was placed into a 15 mm EndoCatch bag. Hemostasis was obtained with one piece of surgical placed into the renal fossa. The specimen was extracted through a modified, muscle-splitting, Calderon incision and sent to pathology.   The extraction incision was closed with #0 Vicryl for the fascia. The skin incisions were closed with 4-0 Monocryl. The wounds were dressed with Dermabond. The patient was then awakened and taken to the PACU in stable condition.    The patient tolerated the procedure well and there were no apparent complications. The patient  was transported to the postanesthesia care unit in stable condition.      Wesley Cleveland MD  Urology  [facility]  Clinic Phone [phone]

## 2019-03-19 NOTE — PROGRESS NOTES
SPIRITUAL HEALTH SERVICES Progress Note  [facility] Pre-surgical    SH consult per pt's request for  support.   Met with pt, Walter, and his spouse, mother, and dtr.   Walter shares that he identifies as [employer]ism and attends [employer] ([employer] in [place].   Walter shares his current concerns (removal of his kidney due to suspected mass) and future concerns for treatment of prostate cancer after recovering from this surgery.   Walter and family then invited prayer. Prayers were offered and pt/family were tearful. Provided emotional support.   No other needs expressed at this time. SH remains available per pt/family/staff need or request.     BULL Avila.  Staff    Pager #[phone]

## 2019-03-19 NOTE — ANESTHESIA POSTPROCEDURE EVALUATION
Patient: Walter Reyes    Procedure(s):  Right laparoscopic radical nephrectomy,    Diagnosis:Right renal mass  Diagnosis Additional Information: Kidney cancer   Dr ta    Anesthesia Type:  General, ETT    Note:  Anesthesia Post Evaluation    Patient location during evaluation: PACU  Patient participation: Able to fully participate in evaluation  Level of consciousness: awake  Pain management: adequate  Airway patency: patent  Cardiovascular status: acceptable  Respiratory status: acceptable  Hydration status: acceptable  PONV: controlled     Anesthetic complications: None          Last vitals:  Vitals:    03/19/19 1615 03/19/19 1630 03/19/19 1645   BP: 164/89 149/79 130/82   Pulse:      Resp: 13 13 12   Temp:      SpO2: 100% 98% 96%         Electronically Signed By: Marlon Vora DO  March 19, 2019  4:59 PM

## 2019-03-19 NOTE — ANESTHESIA CARE TRANSFER NOTE
Patient: Walter Reyes    Procedure(s):  Right laparoscopic radical nephrectomy,    Diagnosis: Right renal mass  Diagnosis Additional Information: No value filed.    Anesthesia Type:   General, ETT     Note:  Airway :Face Mask  Patient transferred to:PACU  Handoff Report: Identifed the Patient, Identified the Reponsible Provider, Reviewed the pertinent medical history, Discussed the surgical course, Reviewed Intra-OP anesthesia mangement and issues during anesthesia, Set expectations for post-procedure period and Allowed opportunity for questions and acknowledgement of understanding      Vitals: (Last set prior to Anesthesia Care Transfer)    CRNA VITALS  3/19/2019 1459 - 3/19/2019 1534      3/19/2019             SpO2:  96 %                Electronically Signed By: LATESHA Olivas CRNA  March 19, 2019  3:34 PM

## 2019-03-20 LAB
ANION GAP SERPL CALCULATED.3IONS-SCNC: 5 MMOL/L (ref 3–14)
BUN SERPL-MCNC: 18 MG/DL (ref 7–30)
CALCIUM SERPL-MCNC: 9 MG/DL (ref 8.5–10.1)
CHLORIDE SERPL-SCNC: 105 MMOL/L (ref 94–109)
CO2 SERPL-SCNC: 25 MMOL/L (ref 20–32)
CREAT SERPL-MCNC: 1.31 MG/DL (ref 0.66–1.25)
ERYTHROCYTE [DISTWIDTH] IN BLOOD BY AUTOMATED COUNT: 13.8 % (ref 10–15)
GFR SERPL CREATININE-BSD FRML MDRD: 60 ML/MIN/{1.73_M2}
GLUCOSE SERPL-MCNC: 112 MG/DL (ref 70–99)
HCT VFR BLD AUTO: 42.4 % (ref 40–53)
HGB BLD-MCNC: 13.4 G/DL (ref 13.3–17.7)
MCH RBC QN AUTO: 28.1 PG (ref 26.5–33)
MCHC RBC AUTO-ENTMCNC: 31.6 G/DL (ref 31.5–36.5)
MCV RBC AUTO: 89 FL (ref 78–100)
PLATELET # BLD AUTO: 191 10E9/L (ref 150–450)
POTASSIUM SERPL-SCNC: 4.3 MMOL/L (ref 3.4–5.3)
RBC # BLD AUTO: 4.77 10E12/L (ref 4.4–5.9)
SODIUM SERPL-SCNC: 135 MMOL/L (ref 133–144)
WBC # BLD AUTO: 11.1 10E9/L (ref 4–11)

## 2019-03-20 PROCEDURE — 25000128 H RX IP 250 OP 636: Performed by: UROLOGY

## 2019-03-20 PROCEDURE — 25800030 ZZH RX IP 258 OP 636: Performed by: UROLOGY

## 2019-03-20 PROCEDURE — 12000000 ZZH R&B MED SURG/OB

## 2019-03-20 PROCEDURE — 25000132 ZZH RX MED GY IP 250 OP 250 PS 637: Performed by: UROLOGY

## 2019-03-20 PROCEDURE — 85027 COMPLETE CBC AUTOMATED: CPT | Performed by: UROLOGY

## 2019-03-20 PROCEDURE — 36415 COLL VENOUS BLD VENIPUNCTURE: CPT | Performed by: UROLOGY

## 2019-03-20 PROCEDURE — 80048 BASIC METABOLIC PNL TOTAL CA: CPT | Performed by: UROLOGY

## 2019-03-20 RX ORDER — OXYCODONE HYDROCHLORIDE 5 MG/1
5-10 TABLET ORAL
Qty: 15 TABLET | Refills: 0 | Status: SHIPPED | OUTPATIENT
Start: 2019-03-20 | End: 2019-04-30

## 2019-03-20 RX ORDER — AMOXICILLIN 250 MG
1 CAPSULE ORAL 2 TIMES DAILY PRN
Qty: 20 TABLET | Refills: 0 | Status: SHIPPED | OUTPATIENT
Start: 2019-03-20 | End: 2019-04-30

## 2019-03-20 RX ADMIN — LEVOTHYROXINE SODIUM 125 MCG: 125 TABLET ORAL at 09:51

## 2019-03-20 RX ADMIN — ACETAMINOPHEN 975 MG: 325 TABLET, FILM COATED ORAL at 02:11

## 2019-03-20 RX ADMIN — SODIUM CHLORIDE: 9 INJECTION, SOLUTION INTRAVENOUS at 19:46

## 2019-03-20 RX ADMIN — DEXTRAN 70, AND HYPROMELLOSE 2910 1 DROP: 1; 3 SOLUTION/ DROPS OPHTHALMIC at 09:51

## 2019-03-20 RX ADMIN — ACETAMINOPHEN 975 MG: 325 TABLET, FILM COATED ORAL at 17:42

## 2019-03-20 RX ADMIN — SODIUM CHLORIDE: 9 INJECTION, SOLUTION INTRAVENOUS at 10:17

## 2019-03-20 RX ADMIN — OMEPRAZOLE 20 MG: 20 CAPSULE, DELAYED RELEASE ORAL at 09:51

## 2019-03-20 RX ADMIN — SENNOSIDES AND DOCUSATE SODIUM 2 TABLET: 8.6; 5 TABLET ORAL at 21:09

## 2019-03-20 RX ADMIN — SENNOSIDES AND DOCUSATE SODIUM 1 TABLET: 8.6; 5 TABLET ORAL at 09:51

## 2019-03-20 RX ADMIN — HYDROMORPHONE HYDROCHLORIDE 0.5 MG: 1 INJECTION, SOLUTION INTRAMUSCULAR; INTRAVENOUS; SUBCUTANEOUS at 06:25

## 2019-03-20 RX ADMIN — ATORVASTATIN CALCIUM 20 MG: 20 TABLET, FILM COATED ORAL at 09:50

## 2019-03-20 RX ADMIN — OYSTER SHELL CALCIUM WITH VITAMIN D 1 TABLET: 500; 200 TABLET, FILM COATED ORAL at 21:09

## 2019-03-20 RX ADMIN — HYDROMORPHONE HYDROCHLORIDE 0.5 MG: 1 INJECTION, SOLUTION INTRAMUSCULAR; INTRAVENOUS; SUBCUTANEOUS at 03:56

## 2019-03-20 RX ADMIN — CALCIUM CARBONATE (ANTACID) CHEW TAB 500 MG 1000 MG: 500 CHEW TAB at 02:13

## 2019-03-20 RX ADMIN — ACETAMINOPHEN 975 MG: 325 TABLET, FILM COATED ORAL at 09:49

## 2019-03-20 RX ADMIN — OYSTER SHELL CALCIUM WITH VITAMIN D 1 TABLET: 500; 200 TABLET, FILM COATED ORAL at 09:51

## 2019-03-20 RX ADMIN — DEXTRAN 70, AND HYPROMELLOSE 2910 1 DROP: 1; 3 SOLUTION/ DROPS OPHTHALMIC at 21:09

## 2019-03-20 RX ADMIN — HEPARIN SODIUM 5000 UNITS: 5000 INJECTION, SOLUTION INTRAVENOUS; SUBCUTANEOUS at 17:43

## 2019-03-20 RX ADMIN — HEPARIN SODIUM 5000 UNITS: 5000 INJECTION, SOLUTION INTRAVENOUS; SUBCUTANEOUS at 09:51

## 2019-03-20 RX ADMIN — OXYCODONE HYDROCHLORIDE 5 MG: 5 TABLET ORAL at 16:19

## 2019-03-20 ASSESSMENT — ACTIVITIES OF DAILY LIVING (ADL)
ADLS_ACUITY_SCORE: 13
ADLS_ACUITY_SCORE: 12
ADLS_ACUITY_SCORE: 13
ADLS_ACUITY_SCORE: 13

## 2019-03-20 NOTE — DISCHARGE SUMMARY
[facility] Discharge Summary: Urology    Walter Reyes MRN# [id number]   Age: 59 year old YOB: 1960     Date of Admission:  3/19/2019  Date of Discharge::  3/21/2019  Admitting Physician:  Wesley Cleveland MD  Discharge Physician:  Sera Givens PA-C, BEBE           Admission Diagnoses:   Right renal mass   Past Medical History:   Diagnosis Date     Cancer of kidney, right (H)      Malignant neoplasm of prostate metastatic to bone (H) 2/14/2019     Thyroid disease            Discharge Diagnosis:   Same          Procedures:   Right laparoscopic radical nephrectomy          Medications Prior to Admission:     Medications Prior to Admission   Medication Sig Dispense Refill Last Dose     atorvastatin (LIPITOR) 20 MG tablet Take 20 mg by mouth daily   3/18/2019 at Unknown time     bicalutamide (CASODEX) 50 MG tablet Take 1 tablet (50 mg) by mouth daily 30 tablet 0 Taking     calcium citrate-vitamin D (CITRACAL) 315-250 MG-UNIT TABS per tablet Take 650 mg by mouth 2 times daily    Taking     cetirizine (ZYRTEC) 10 MG tablet Take 10 mg by mouth as needed   3/18/2019 at Unknown time     cycloSPORINE (RESTASIS) 0.05 % ophthalmic emulsion Place 1 drop into both eyes 2 times daily    Taking     fluticasone (FLONASE) 50 MCG/ACT nasal spray Spray 2 sprays in nostril daily as needed    Taking     levothyroxine (SYNTHROID/LEVOTHROID) 125 MCG tablet Take 125 mcg by mouth daily   3/18/2019 at Unknown time     Multiple Vitamins-Minerals (MULTIVITAMIN ADULT EXTRA C PO) Take 1 tablet by mouth every 24 hours   Taking     omeprazole (PRILOSEC) 20 MG DR capsule Take 20 mg by mouth daily   3/18/2019 at Unknown time     cinnamon 500 MG CAPS Take 500 mg by mouth daily    Taking     Glucosamine-Chondroit-Vit C-Mn (GLUCOSAMINE 1500 COMPLEX) CAPS Take 1 capsule by mouth daily   Taking     Nutritional Supplements (SALMON OIL) CAPS Take 1 capsule by mouth daily   Taking             Discharge  Medications:     Current Discharge Medication List      START taking these medications    Details   oxyCODONE (ROXICODONE) 5 MG tablet Take 1-2 tablets (5-10 mg) by mouth every 3 hours as needed  Qty: 15 tablet, Refills: 0    Associated Diagnoses: Right renal mass      senna-docusate (SENOKOT-S/PERICOLACE) 8.6-50 MG tablet Take 1 tablet by mouth 2 times daily as needed for constipation  Qty: 20 tablet, Refills: 0    Associated Diagnoses: Right renal mass         CONTINUE these medications which have NOT CHANGED    Details   atorvastatin (LIPITOR) 20 MG tablet Take 20 mg by mouth daily      bicalutamide (CASODEX) 50 MG tablet Take 1 tablet (50 mg) by mouth daily  Qty: 30 tablet, Refills: 0    Associated Diagnoses: Malignant neoplasm of prostate metastatic to bone (H)      calcium citrate-vitamin D (CITRACAL) 315-250 MG-UNIT TABS per tablet Take 650 mg by mouth 2 times daily       cetirizine (ZYRTEC) 10 MG tablet Take 10 mg by mouth as needed      cycloSPORINE (RESTASIS) 0.05 % ophthalmic emulsion Place 1 drop into both eyes 2 times daily       fluticasone (FLONASE) 50 MCG/ACT nasal spray Spray 2 sprays in nostril daily as needed       levothyroxine (SYNTHROID/LEVOTHROID) 125 MCG tablet Take 125 mcg by mouth daily      Multiple Vitamins-Minerals (MULTIVITAMIN ADULT EXTRA C PO) Take 1 tablet by mouth every 24 hours      omeprazole (PRILOSEC) 20 MG DR capsule Take 20 mg by mouth daily      cinnamon 500 MG CAPS Take 500 mg by mouth daily       Glucosamine-Chondroit-Vit C-Mn (GLUCOSAMINE 1500 COMPLEX) CAPS Take 1 capsule by mouth daily      Nutritional Supplements (SALMON OIL) CAPS Take 1 capsule by mouth daily                   Consultations:     None          Hospital Course:     The patient underwent the above procedure and tolerated this well. Uncomplicated post operative course. Had adequate pain control, voiding, ambulating and tolerating regular diet at the time of discharge.            Discharge Instructions and  Follow-Up:   Discharge diet: Regular   Discharge activity: No lifting >10lbs or strenuous exercise for 4 week(s)   Discharge follow-up: Dr. Cleveland   Wound care: Ice to area for comfort  Keep wound clean and dry           Discharge Disposition:   Discharged to home        Sera Givens PA-C  [facility] Urology

## 2019-03-20 NOTE — PROGRESS NOTES
"SPIRITUAL HEALTH SERVICES Progress Note  [facility] Oncology    SH f/u per plan of care established during pre-surgical visit.   Met with pt, Walter and his spouse, Micheline.   Waltre processed the over night events, shared that he is working on \"getting the pain under control and meeting the requirements to get out of here.\"   Walter shares that he is looking forward to visits from his sons, who are both Pastors, and praying with them. He hopes to discharge in the 2-3 days.   No formal plans to follow. SH remains available per pt/family/staff need or request.     BULL Avila.  Staff    Pager #[phone]     "

## 2019-03-20 NOTE — PLAN OF CARE
Pt sleeping well overnight with wife at bedside, capnography intact without alarms, supplemental oxygen 2LPM, VSS, reports some pain to abd with ice packs and scheduled tylenol effective and PRN dilaudid x1. Stood at bedside x1, needs encouragement for ambulation. Incisions derma bonded and WDL. Lepe with adequate output. Expected discharge TBD.

## 2019-03-20 NOTE — PLAN OF CARE
Arrived from PACU at 1745 s/p right radical nephrectomy.  Lethargic, oriented.   VSS, 2L O2/NC, sats mid 90's.  Capnography on, WNL.  Reports right abdominal pain with some relief after IV Dilaudid 0.5mg.  Taking ice chips, negative flatus.  Abdominal incisions with liquid bandage, no drainage.  Lepe patent.  PCD's on.  Instructed on incentive spirometer.  Wife, Micheline, staying overnight, supportive.  Will continue to monitor, continue with plan of care.

## 2019-03-20 NOTE — PROGRESS NOTES
[facility] Urology Progress Note          Assessment and Plan:   Active Problems:    Right renal mass    Assessment: POD 1 Right laparoscopic radical nephrectomy    Plan: Path pend, ambulate, IS  Cr bump, expected. Follow. Good UOP  Hgb stable, continue prophy Hep  Tolerating clears, BONG  Lepe can be discontinued  Oxy and Tylenol PRN    Discharge insturctions reviewed. Likely needs a 2nd day for pain control and advancement of diet.    Dr. Cleveland updated.    Sera Givens PA-C  [facility] Urology  [phone]               Interval History:   doing well; Pain 5/10, ambulated.               Review of Systems:   The 5 point Review of Systems is negative other than noted in the HPI             Medications:     Current Facility-Administered Medications Ordered in Epic   Medication Dose Route Frequency Last Rate Last Dose     [START ON 3/22/2019] acetaminophen (TYLENOL) tablet 650 mg  650 mg Oral Q4H PRN         acetaminophen (TYLENOL) tablet 975 mg  975 mg Oral Q8H   975 mg at 03/20/19 0949     atorvastatin (LIPITOR) tablet 20 mg  20 mg Oral Daily   20 mg at 03/20/19 0950     benzocaine-menthol (CHLORASEPTIC) 6-10 MG lozenge 1 lozenge  1 lozenge Buccal Q1H PRN         calcium carbonate (TUMS) chewable tablet 1,000 mg  1,000 mg Oral 4x Daily PRN   1,000 mg at 03/20/19 0213     calcium carbonate-vitamin D (OSCAL w/D) per tablet 1 tablet  1 tablet Oral BID   1 tablet at 03/20/19 0951     fluticasone (FLONASE) 50 MCG/ACT spray 2 spray  2 spray Nasal Daily PRN         heparin sodium injection 5,000 Units  5,000 Units Subcutaneous Q8H   5,000 Units at 03/20/19 0951     HYDROmorphone (PF) (DILAUDID) injection 0.3-0.5 mg  0.3-0.5 mg Intravenous Q2H PRN   0.5 mg at 03/20/19 0625     hypromellose-dextran (ARTIFICAL TEARS) 0.1-0.3 % ophthalmic solution 1 drop  1 drop Both Eyes BID   1 drop at 03/20/19 0951     levothyroxine (SYNTHROID/LEVOTHROID) tablet 125 mcg  125 mcg Oral Daily   125 mcg at 03/20/19 0951      lidocaine (LMX4) cream   Topical Q1H PRN         lidocaine 1 % 0.1-1 mL  0.1-1 mL Other Q1H PRN         naloxone (NARCAN) injection 0.1-0.4 mg  0.1-0.4 mg Intravenous Q2 Min PRN         omeprazole (priLOSEC) CR capsule 20 mg  20 mg Oral Daily   20 mg at 03/20/19 0951     ondansetron (ZOFRAN-ODT) ODT tab 4 mg  4 mg Oral Q6H PRN        Or     ondansetron (ZOFRAN) injection 4 mg  4 mg Intravenous Q6H PRN         oxyCODONE (ROXICODONE) tablet 5-10 mg  5-10 mg Oral Q3H PRN         prochlorperazine (COMPAZINE) injection 10 mg  10 mg Intravenous Q6H PRN        Or     prochlorperazine (COMPAZINE) tablet 10 mg  10 mg Oral Q6H PRN         senna-docusate (SENOKOT-S/PERICOLACE) 8.6-50 MG per tablet 1 tablet  1 tablet Oral BID   1 tablet at 03/20/19 0951    Or     senna-docusate (SENOKOT-S/PERICOLACE) 8.6-50 MG per tablet 2 tablet  2 tablet Oral BID         sodium chloride (PF) 0.9% PF flush 3 mL  3 mL Intracatheter q1 min prn         sodium chloride (PF) 0.9% PF flush 3 mL  3 mL Intracatheter Q8H         sodium chloride 0.9% infusion   Intravenous Continuous 100 mL/hr at 03/20/19 1017       No current [facility]-ordered outpatient medications on file.                  Physical Exam:   Vitals were reviewed  Patient Vitals for the past 8 hrs:   BP Temp Temp src Pulse Resp SpO2   03/20/19 0725 134/83 98.4  F (36.9  C) Oral 57 16 96 %     GEN: NAD, lying in bed  EYES: EOMI  MOUTH: MMM  NECK: Supple  RESP: Unlabored breathing  CARDIAC: No LE edema  SKIN: Warm  ABD: soft, tympany throughout, inc c/d/i  NEURO: AAO           Data:   No results found for: NTBNPI, NTBNP  Lab Results   Component Value Date    WBC 11.1 (H) 03/20/2019    WBC 12.0 (H) 03/19/2019    WBC 6.5 02/26/2019    HGB 13.4 03/20/2019    HGB 14.0 03/19/2019    HGB 14.1 02/26/2019    HCT 42.4 03/20/2019    HCT 44.2 03/19/2019    HCT 45.3 02/26/2019    MCV 89 03/20/2019    MCV 88 03/19/2019    MCV 89 02/26/2019     03/20/2019     03/19/2019     02/26/2019      No results found for: INR

## 2019-03-20 NOTE — PLAN OF CARE
Assumed care at 1900.  Pt POD 0 from Radical Right Lap nephrectomy, Pt currently on 2L O2 with capno on.  Lepe in place.  4 Incisions closed with liquid bandage QUYNH no drainage, ice applied. IV dilaudid 0.5mg given x2 currently rating pain 2/10. PIV in L infusing.  Wife Micheline supportive with cares and is at bedside. Patient dangled this shift.

## 2019-03-20 NOTE — PLAN OF CARE
VSS, weaned off O2, tolerating RA  Pain: 4/10, managing with scheduled tylenol and ice  A & O X 4  Activity: Walked around unit x 3, SBA  Bs: active x 4, passing flatus, tolerating full liquid diet   Surgical sites: approximated with dermabond  Urine output: large amounts of light yellow clear urine in coreas; coreas removed at 1340, voided 275 mL ~ 1500    Plan: pain control, advancement of diet, discharge ~1 day

## 2019-03-21 VITALS
SYSTOLIC BLOOD PRESSURE: 140 MMHG | HEART RATE: 59 BPM | OXYGEN SATURATION: 93 % | TEMPERATURE: 98.4 F | DIASTOLIC BLOOD PRESSURE: 90 MMHG | HEIGHT: 72 IN | RESPIRATION RATE: 16 BRPM | BODY MASS INDEX: 32.41 KG/M2 | WEIGHT: 239.3 LBS

## 2019-03-21 LAB
COPATH REPORT: NORMAL
GLUCOSE BLDC GLUCOMTR-MCNC: 104 MG/DL (ref 70–99)

## 2019-03-21 PROCEDURE — 00000146 ZZHCL STATISTIC GLUCOSE BY METER IP

## 2019-03-21 PROCEDURE — 25000132 ZZH RX MED GY IP 250 OP 250 PS 637: Performed by: UROLOGY

## 2019-03-21 PROCEDURE — 25000128 H RX IP 250 OP 636: Performed by: UROLOGY

## 2019-03-21 RX ADMIN — HEPARIN SODIUM 5000 UNITS: 5000 INJECTION, SOLUTION INTRAVENOUS; SUBCUTANEOUS at 09:50

## 2019-03-21 RX ADMIN — OMEPRAZOLE 20 MG: 20 CAPSULE, DELAYED RELEASE ORAL at 07:53

## 2019-03-21 RX ADMIN — ACETAMINOPHEN 975 MG: 325 TABLET, FILM COATED ORAL at 01:16

## 2019-03-21 RX ADMIN — ATORVASTATIN CALCIUM 20 MG: 20 TABLET, FILM COATED ORAL at 07:54

## 2019-03-21 RX ADMIN — ACETAMINOPHEN 975 MG: 325 TABLET, FILM COATED ORAL at 09:50

## 2019-03-21 RX ADMIN — SENNOSIDES AND DOCUSATE SODIUM 2 TABLET: 8.6; 5 TABLET ORAL at 07:54

## 2019-03-21 RX ADMIN — OXYCODONE HYDROCHLORIDE 5 MG: 5 TABLET ORAL at 07:53

## 2019-03-21 RX ADMIN — OXYCODONE HYDROCHLORIDE 5 MG: 5 TABLET ORAL at 14:02

## 2019-03-21 RX ADMIN — OYSTER SHELL CALCIUM WITH VITAMIN D 1 TABLET: 500; 200 TABLET, FILM COATED ORAL at 07:53

## 2019-03-21 RX ADMIN — OXYCODONE HYDROCHLORIDE 5 MG: 5 TABLET ORAL at 01:16

## 2019-03-21 RX ADMIN — LEVOTHYROXINE SODIUM 125 MCG: 125 TABLET ORAL at 07:53

## 2019-03-21 RX ADMIN — HEPARIN SODIUM 5000 UNITS: 5000 INJECTION, SOLUTION INTRAVENOUS; SUBCUTANEOUS at 01:16

## 2019-03-21 ASSESSMENT — ACTIVITIES OF DAILY LIVING (ADL)
ADLS_ACUITY_SCORE: 13

## 2019-03-21 ASSESSMENT — MIFFLIN-ST. JEOR: SCORE: 1948.59

## 2019-03-21 NOTE — PLAN OF CARE
A&Ox4, Temp 99.9 other VSS, 94% sats on RA.  LS clear, encouraged incentive spirometer use, up to 2000, tolerating well.  Hypo bowel sounds, passing small amounts flatus, c/o gas pains.  Ambulating in halls with SBA, up in chair.  Tolerating soft diet.  Abdominal incisions with dermabond, pink, no drainage.  Pain managed with scheduled tylenol, prn oxycodone 5mg x1 and ice/heat.  Family at bedside, supportive.  Plan for probable discharge home tomorrow. Continue with plan of care.

## 2019-03-21 NOTE — PLAN OF CARE
Pain well controlled on current oral regimen, up independently, vdg adequately, tolerating regular diet, passing flatus but no bm yet, on senna and will continue this at discharge, plan to discharge home with wife today, all discharge prescriptions filled and sent with patient, all discharge medications and instructions reviewed with patient and he verbalizes understanding of discharge plan

## 2019-03-21 NOTE — PLAN OF CARE
Alert and oriented x4. Up in room independently. Voiding adequately. Oxy given x1 for pain. Oral temp of 99.6. Came down to 97.9. Maintained BPs of 150s/90s. Incision/lap sites intact, open to air. Bowel sounds faint but passing gas. Denies nausea. Tolerating regular diet. Wife at bedside. Possible discharge today if pain remains controlled.

## 2019-03-21 NOTE — PROGRESS NOTES
Urology   No acute events overnight  Pain well controlled.  Tolerating regular diet.  No n/v.  Passing flatus.   Ambulating.    AVSS  UOP 4475    NAD  Abd soft.  NT.  ND.  Inc CDI.  No erythema or drainage.  Ext NT, no edema    A/P 59 year old male POD #2 s/p lap nephrectomy.    - Pain control: oral meds  - Diet: as tolerated  -  drains/tubes: none  - Dispo: Discharge home today    Wesley Cleveland MD  Urology  [facility]  Pager [phone]

## 2019-03-25 ENCOUNTER — TELEPHONE (OUTPATIENT)
Dept: UROLOGY | Facility: CLINIC | Age: 59
End: 2019-03-25

## 2019-03-25 NOTE — TELEPHONE ENCOUNTER
Patient called nurse line and LM. Returned phone call and spoke with patient. He reports slight rash on body. It may be related to patient's pain medication (Oxycodone) Patient was recommended to try a benadryl. Patient is not having any other symptoms that are worrisome at present. Patient will try to reduce oxycodone.  If symptoms get worse patient instructed to call.     Sachi Bean LPN

## 2019-03-26 ENCOUNTER — ONCOLOGY VISIT (OUTPATIENT)
Dept: ONCOLOGY | Facility: CLINIC | Age: 59
End: 2019-03-26
Attending: INTERNAL MEDICINE
Payer: COMMERCIAL

## 2019-03-26 ENCOUNTER — APPOINTMENT (OUTPATIENT)
Dept: LAB | Facility: CLINIC | Age: 59
End: 2019-03-26
Attending: INTERNAL MEDICINE
Payer: COMMERCIAL

## 2019-03-26 VITALS
SYSTOLIC BLOOD PRESSURE: 146 MMHG | BODY MASS INDEX: 31.61 KG/M2 | HEART RATE: 50 BPM | HEIGHT: 72 IN | RESPIRATION RATE: 18 BRPM | DIASTOLIC BLOOD PRESSURE: 88 MMHG | OXYGEN SATURATION: 97 % | TEMPERATURE: 98.6 F | WEIGHT: 233.4 LBS

## 2019-03-26 DIAGNOSIS — C61 MALIGNANT NEOPLASM OF PROSTATE METASTATIC TO BONE (H): ICD-10-CM

## 2019-03-26 DIAGNOSIS — L27.0 ALLERGIC DRUG RASH: Primary | ICD-10-CM

## 2019-03-26 DIAGNOSIS — C79.51 MALIGNANT NEOPLASM OF PROSTATE METASTATIC TO BONE (H): ICD-10-CM

## 2019-03-26 LAB
ALBUMIN SERPL-MCNC: 3.5 G/DL (ref 3.4–5)
ALP SERPL-CCNC: 116 U/L (ref 40–150)
ALT SERPL W P-5'-P-CCNC: 54 U/L (ref 0–70)
ANION GAP SERPL CALCULATED.3IONS-SCNC: 7 MMOL/L (ref 3–14)
AST SERPL W P-5'-P-CCNC: 27 U/L (ref 0–45)
BASOPHILS # BLD AUTO: 0 10E9/L (ref 0–0.2)
BASOPHILS NFR BLD AUTO: 0.5 %
BILIRUB SERPL-MCNC: 0.3 MG/DL (ref 0.2–1.3)
BUN SERPL-MCNC: 23 MG/DL (ref 7–30)
CALCIUM SERPL-MCNC: 9.2 MG/DL (ref 8.5–10.1)
CHLORIDE SERPL-SCNC: 107 MMOL/L (ref 94–109)
CO2 SERPL-SCNC: 26 MMOL/L (ref 20–32)
CREAT SERPL-MCNC: 1.37 MG/DL (ref 0.66–1.25)
DIFFERENTIAL METHOD BLD: NORMAL
EOSINOPHIL # BLD AUTO: 0.4 10E9/L (ref 0–0.7)
EOSINOPHIL NFR BLD AUTO: 5.8 %
ERYTHROCYTE [DISTWIDTH] IN BLOOD BY AUTOMATED COUNT: 13.3 % (ref 10–15)
GFR SERPL CREATININE-BSD FRML MDRD: 57 ML/MIN/{1.73_M2}
GLUCOSE SERPL-MCNC: 96 MG/DL (ref 70–99)
HCT VFR BLD AUTO: 42.5 % (ref 40–53)
HGB BLD-MCNC: 13.4 G/DL (ref 13.3–17.7)
IMM GRANULOCYTES # BLD: 0 10E9/L (ref 0–0.4)
IMM GRANULOCYTES NFR BLD: 0.5 %
LYMPHOCYTES # BLD AUTO: 1.9 10E9/L (ref 0.8–5.3)
LYMPHOCYTES NFR BLD AUTO: 25.8 %
MCH RBC QN AUTO: 27.6 PG (ref 26.5–33)
MCHC RBC AUTO-ENTMCNC: 31.5 G/DL (ref 31.5–36.5)
MCV RBC AUTO: 87 FL (ref 78–100)
MONOCYTES # BLD AUTO: 0.7 10E9/L (ref 0–1.3)
MONOCYTES NFR BLD AUTO: 9.1 %
NEUTROPHILS # BLD AUTO: 4.3 10E9/L (ref 1.6–8.3)
NEUTROPHILS NFR BLD AUTO: 58.3 %
NRBC # BLD AUTO: 0 10*3/UL
NRBC BLD AUTO-RTO: 0 /100
PLATELET # BLD AUTO: 260 10E9/L (ref 150–450)
POTASSIUM SERPL-SCNC: 3.9 MMOL/L (ref 3.4–5.3)
PROT SERPL-MCNC: 7.9 G/DL (ref 6.8–8.8)
PSA SERPL-MCNC: 1.25 UG/L (ref 0–4)
RBC # BLD AUTO: 4.86 10E12/L (ref 4.4–5.9)
SODIUM SERPL-SCNC: 140 MMOL/L (ref 133–144)
WBC # BLD AUTO: 7.4 10E9/L (ref 4–11)

## 2019-03-26 PROCEDURE — 36415 COLL VENOUS BLD VENIPUNCTURE: CPT

## 2019-03-26 PROCEDURE — G0463 HOSPITAL OUTPT CLINIC VISIT: HCPCS | Mod: ZF

## 2019-03-26 PROCEDURE — 84153 ASSAY OF PSA TOTAL: CPT | Performed by: INTERNAL MEDICINE

## 2019-03-26 PROCEDURE — 84403 ASSAY OF TOTAL TESTOSTERONE: CPT | Performed by: INTERNAL MEDICINE

## 2019-03-26 PROCEDURE — 86316 IMMUNOASSAY TUMOR OTHER: CPT | Performed by: INTERNAL MEDICINE

## 2019-03-26 PROCEDURE — 99215 OFFICE O/P EST HI 40 MIN: CPT | Mod: ZP | Performed by: INTERNAL MEDICINE

## 2019-03-26 PROCEDURE — 85025 COMPLETE CBC W/AUTO DIFF WBC: CPT | Performed by: INTERNAL MEDICINE

## 2019-03-26 PROCEDURE — 80053 COMPREHEN METABOLIC PANEL: CPT | Performed by: INTERNAL MEDICINE

## 2019-03-26 RX ORDER — HYDROXYZINE HYDROCHLORIDE 25 MG/1
25 TABLET, FILM COATED ORAL 2 TIMES DAILY
Qty: 10 TABLET | Refills: 0 | Status: SHIPPED | OUTPATIENT
Start: 2019-03-26 | End: 2019-04-30

## 2019-03-26 RX ORDER — METHYLPREDNISOLONE 4 MG
TABLET, DOSE PACK ORAL
Qty: 21 TABLET | Refills: 0 | Status: SHIPPED | OUTPATIENT
Start: 2019-03-26 | End: 2019-04-30

## 2019-03-26 ASSESSMENT — PAIN SCALES - GENERAL: PAINLEVEL: NO PAIN (0)

## 2019-03-26 ASSESSMENT — MIFFLIN-ST. JEOR: SCORE: 1921.83

## 2019-03-26 NOTE — PROGRESS NOTES
MEDICAL ONCOLOGY CLINIC NOTE    REFERRING PROVIDER: Jorge Alberto Yepez MD at [facility].    REASON FOR CURRENT VISIT:   1. Evaluation while on ADT for metastatic prostate cancer.  2. Discussion of treatment options for stage 3 RCC.     HISTORY OF PRESENT ILLNESS:  Mr. 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 His oncologic history is detailed below.  His wife, Micheline, accompanies him for this visit.    Since last visit, Walter has stopped Casodex and undergone a robotic right nephrectomy with Dr. Cleveland on 03/19/19. The surgery was uneventful and he is recovering well from wound healing standpoint. Not requiring oxycodone and only using tylenol for pain control. Good bladder/bowel functions. No hematuria.     No treatment related side effects from ADT except for hot flashes (grade 1) at thsi time. No bone pain, hematuria, SOA/cough/CP etc. Chronic obstructive urinary symptoms unchanged.     He noticed a rash on the right lower back as well as the right flank region past Saturday and the rash has not increased in size.  It is pruritic and erythematous.  No fever, mucositis, skin peeling, hand-foot syndrome etc.  Only reported allergy is doxycycline to which he has GI upset. He was exposed to several new medications including antibiotic and wound care products while hospitalized within the past week, but is not on any new medication or using a wound care product currently.     ONCOLOGIC HISTORY:  1. Prostate adenocarcinoma, stage IV (M1b at diagnosis), high-volume, castration-sensitive:  - 12/13/2018: PSA found to be elevated to 9.1 ng/mL on a routine follow-up with primary care provider Dr. Naylor at [facility]. Prior PSA were 1.4 on 8/16/17, 2.4 on 6/28/16, 2.9 on 6/28/16, and as low as 0.4 on 3/26/2003.   - 1/08/2019: Consultation with Sarah Wilson CNP in Urology clinic. Repeat PSA  "9.6.  - 1/16/2019: MRI prostate with contrast - \"This examination is characterized as PIRADS 5- very high probability. Clinically significant cancer is highly likely to be present. There is a large, invasive mass arising from the right peripheral zone and extending into the neurovascular bundle, seminal vesicle, and along the anterolateral right mesorectal fascia. Metastatic right external iliac lymph node. Metastatic lesion in the posterior/superior right acetabulum.\"  - 1/25/2019: CT abdomen and pelvis with IV contrast - \"1. Heterogeneous enhancing mass posteriorly in the upper pole of the right kidney measures 4.5 x 5.8 x 5.7 cm (AP by transverse by craniocaudal). It has a small nodular component extending posterior medially which abuts the right psoas muscle. This nodular extension measures 2.1 x 2.1 cm. Minimal stranding about the mass. No definite thrombus within the right renal vein. This renal mass is compatible with a renal cell carcinoma. A paraaortic lymph node situated immediately posterior to the left renal vein measures 1.1 cm in short axis, suspicious for metastasis. 2. A 2 cm right external iliac lymph node is also suspicious for metastases. 3. Multiple sclerotic osseous lesions suspicious for metastases. These include 0.8 and 0.6 cm sclerotic lesions laterally in the right iliac wing (images 53 and 62 respectively). Ill-defined groundglass density laterally in the right acetabulum measuring 1.7 x 1.2 cm corresponds with the lesion identified on MRI. A 0.4 cm groundglass density in the left acetabulum. Sclerotic lesion in the left femoral neck measures 0.9 x 1.6 cm (image 81). Sclerotic metastases would be more compatible with prostate metastases. 4. A 3 subcentimeter hepatic lesions are indeterminate. Metastases would be a consideration. These can be further characterized with liver MRI.\"  - 1/25/2019: NM bone scan - \"There is focal bony uptake in the left femoral neck, right acetabulum, right of " "midline at the S1 or L5 level of the spine, within multiple bilateral ribs, in the C7 or T1 level of the spine, and anteriorly within the skull. Findings are suspicious for metastases.\"  - 1/31/19: CT chest with contrast - \" No suspicious nodules in the chest.  Stable appearance of a 5.8 cm right renal mass concerning for renal carcinoma until proven otherwise.  Stable indeterminant subcentimeter hypodensities in the liver.  Multifocal osteoblastic metastasis including 2.5 cm lesion in the right fifth rib posteriorly and a 1.6 cm lesion in the right third rib posteriorly. No lytic lesions identified.\"  - 2/6/19: CT-guided right sclerotic fifth rib lesion biopsy - \"Metastatic carcinoma, consistent with prostate primary.  Immunohistochemical stains performed show the metastatic carcinoma stains positive for NKX3.1 (prostate marker), negative for STACIE 3 and PAX8, which supports the above diagnosis.\"  - 2/15/19: Started Casodex 50mg every day and consented for the biobanking protocol. 3/18/19 - stopped Casodex.  - 2/19/19: Case discussed in tumor board - recommendation for nephectomy for suspected malignant right renal mass.   - 02/26/19: Started Lupron 22.5mg every 3 months.    2. Stage III ([id number]), grade 3 of 4, clear-cell RCC of right kidney:  - Incidentally diagnosed as above.   - Underwent curative-intent robotic right radical nephrectomy with Dr. Wesley Cleveland on 3/19/19. No tumor spillage per op report.   - Path showed: \"Histologic Type: Clear cell renal cell carcinoma; Sarcomatoid Features: Not identified; Histologic Grade: Nucleolar grade 3 (WHO/ISUP). Extent Tumor Size: 4.3 cm. Microscopic Tumor Extension: Tumor extension into renal sinus (in vascular structures). Margins: Negative. Tumor Necrosis: Present; focal. Lymph-Vascular Invasion: Not identified.  Pathologic Staging (pTNM) Primary Tumor (pT): pT3a: Tumor extends into renal vein branches/renal sinus. Regional Lymph Nodes (pN): pNX. Number of Lymph " "Nodes Examined: 0 Distant Metastasis (pM): pM N/A.\"    REVIEW OF SYSTEMS: 14 point ROS negative other than the symptoms noted above in the HPI.    PAST MEDICAL HISTORY:  1. Prostate cancer.  2. Hypothyroidism.  3. Allergic rhinitis.  4. Esophageal reflux disorder.  5. Dyslipidemia.  6. Glucose intolerance.  7. Obesity.     PAST SURGICAL HISTORY:   As above.    SOCIAL HISTORY:   He is  and lives with his wife, Micheline in [place]. He has 4 children and 3 sisters (2 elder brothers  from heart failure). Denies tobacco, alcohol or illicit drug use. 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    FAMILY HISTORY:   He does have a remarkable family history with several maternal relatives affected with cancer.  He has 5 siblings including one sister with skin cancer but no other malignancies.  2 of his brothers passed away at age 68 each because of smoking alcohol use and illicit drug use related heart failure.  Several maternal aunts with lymphoma, colon, liver, breast, and lung cancer, as well as a maternal uncle with lung cancer. His paternal uncle had stomach cancer.     ALLERGIES:   Allergies   Allergen Reactions     Doxycycline GI Disturbance     CURRENT MEDICATIONS:   Current Outpatient Medications:      atorvastatin (LIPITOR) 20 MG tablet, Take 20 mg by mouth daily, Disp: , Rfl:      calcium citrate-vitamin D (CITRACAL) 315-250 MG-UNIT TABS per tablet, Take 650 mg by mouth 2 times daily , Disp: , Rfl:      cetirizine (ZYRTEC) 10 MG tablet, Take 10 mg by mouth as needed, Disp: , Rfl:      cinnamon 500 MG CAPS, Take 500 mg by mouth daily , Disp: , Rfl:      cycloSPORINE (RESTASIS) 0.05 % ophthalmic emulsion, Place 1 drop into both eyes 2 times daily , Disp: , Rfl:      fluticasone (FLONASE) 50 MCG/ACT nasal spray, Spray 2 sprays in nostril daily as needed , Disp: , Rfl:      Glucosamine-Chondroit-Vit C-Mn (GLUCOSAMINE 1500 COMPLEX) CAPS, Take 1 capsule by mouth daily, " "Disp: , Rfl:      hydrOXYzine (ATARAX) 25 MG tablet, Take 1 tablet (25 mg) by mouth 2 times daily For 5 days for rash., Disp: 10 tablet, Rfl: 0     levothyroxine (SYNTHROID/LEVOTHROID) 125 MCG tablet, Take 125 mcg by mouth daily, Disp: , Rfl:      methylPREDNISolone (MEDROL DOSEPAK) 4 MG tablet therapy pack, Follow Package Directions, Disp: 21 tablet, Rfl: 0     Multiple Vitamins-Minerals (MULTIVITAMIN ADULT EXTRA C PO), Take 1 tablet by mouth every 24 hours, Disp: , Rfl:      Nutritional Supplements (SALMON OIL) CAPS, Take 1 capsule by mouth daily, Disp: , Rfl:      omeprazole (PRILOSEC) 20 MG DR capsule, Take 20 mg by mouth daily, Disp: , Rfl:      oxyCODONE (ROXICODONE) 5 MG tablet, Take 1-2 tablets (5-10 mg) by mouth every 3 hours as needed, Disp: 15 tablet, Rfl: 0     senna-docusate (SENOKOT-S/PERICOLACE) 8.6-50 MG tablet, Take 1 tablet by mouth 2 times daily as needed for constipation, Disp: 20 tablet, Rfl: 0     bicalutamide (CASODEX) 50 MG tablet, Take 1 tablet (50 mg) by mouth daily, Disp: 30 tablet, Rfl: 0    PHYSICAL EXAMINATION:  Vital signs: /88 (BP Location: Right arm, Patient Position: Sitting)   Pulse 50   Temp 98.6  F (37  C) (Oral)   Resp 18   Ht 1.829 m (6' 0.01\")   Wt 105.9 kg (233 lb 6.4 oz)   SpO2 97%   BMI 31.65 kg/m    ECOG performance status of 1. Fatigue mild.  GENERAL: Well-nourished healthy-appearing man in chair, no acute distress.   HEENT: No icterus, no pallor. Moist mucous membranes. Oropharynx is clear. No mucosal abnormalities.  NECK: Supple, no JVD/LAD.  LUNGS: Clear to ausculation bilaterally, normal work of breathing.   CARDIOVASCULAR: Regular rate and rhythm, no murmurs, gallops or rubs.   ABDOMEN: Soft, nontender and nondistended, no palpable masses, bowel sounds present.  EXTREMITIES: No cyanosis, no clubbing, no edema.   NEUROLOGIC: No focal deficits, CN 2-12 intact.  LYMPH NODE EXAM: No palpable adenopathy - cervical, axillary or inguinal.   SKIN: Erythematous, " "maculopapular, confluent, pruritic rash covering the entirety of upper and lower back extending up to the gluteal fold and bilaterally into the flank regions, involving approximately 30-35% body surface area.    LABORATORY DATA:  Lab Test 03/26/19  1604 03/20/19  0612 03/19/19  1543 02/26/19  1425 02/15/19  1044   WBC 7.4 11.1* 12.0* 6.5 5.1   RBC 4.86 4.77 5.01 5.11 5.05   HGB 13.4 13.4 14.0 14.1 14.7   HCT 42.5 42.4 44.2 45.3 43.1   MCV 87 89 88 89 85   MCH 27.6 28.1 27.9 27.6 29.1   MCHC 31.5 31.6 31.7 31.1* 34.1   RDW 13.3 13.8 13.4 13.8 13.7    191 167 231 215   NEUTROPHIL 58.3  --   --  55.7 49.3    135 140 140 140   POTASSIUM 3.9 4.3 4.4 3.6 3.9   CHLORIDE 107 105 107 108 108   CO2 26 25 29 26 25   ANIONGAP 7 5 4 6 7   GLC 96 112* 121* 87 86   BUN 23 18 13 16 15   CR 1.37* 1.31* 0.94 0.87 0.78   BETHANY 9.2 9.0 8.6 8.5 8.6   PROTTOTAL 7.9  --   --  7.6 7.4   ALBUMIN 3.5  --   --  3.8 3.6   BILITOTAL 0.3  --   --  0.3 0.5   ALKPHOS 116  --   --  98 91   AST 27  --   --  23 21   ALT 54  --   --  26 26     Lab Test 03/26/19  1604 02/26/19  1425 02/15/19  1044   PSA 1.25 8.00* 12.30*   Testo total              222  ChromoA          475    IMAGING STUDIES:  As above.    ASSESSMENT AND PLAN: Mr. Reyes is a delightful 58-year-old gentleman with newly diagnosed metastatic castration sensitive prostate adenocarcinoma as well as a stage III clear-cell renal cell carcinoma of right kidney, who is here for a follow-up visit.     Metastatic prostate cancer:   - Patient meets the criteria for CHAARTED \"high-volume\" metastatic hormone-sensitive prostate cancer.  - I do not have a clinical trial for this gentleman at this time, especially given the suspicion for a concurrent malignancy.  He has enrolled in the institutional biobanking study on 2/15/19 - this is for non-interventional study.   - We again reviewed systemic therapy options for newly-diagnosed metastatic hormone-sensitive prostate cancer " including GNRH agonist versus antagonist by itself versus GNRH agonist/antagonist in combination with docetaxel (CHAARTED, GETUG-AFU15, STAMPEDE) or abiraterone (STAMPEDE, LATTITUDE) based on his disease volume and preference in terms of therapeutic options.   - He wants to pursue chemohormonal therapy after understanding the options.   - Continue Lupron 22.5mg every 3 monthly for now - next dose in end of May 2019.  - Plan to start docetaxel in early/mid May 2019 depending on recovery from the nephrectomy surgery.   - Will repeat restaging scans in late April.   - He understands the risks and benefits of ADT including hot flashes, mood changes and long-term cardiovascular, bone health, etc. Also understands the risks of docetaxel treatment including fatigue, nausea, vomiting, diarrhea/constipation, hand-foot syndrome, allergic reactions, myelosuppression with increased risk of infection and bleeding etc.  - Will also add antiresorptive therapy after mgmt of renal mass.     Stage 3, UISS-high risk ccRCC:  - I had a detailed conversation regarding op report and pathology findings from the nephrectomy surgery with patient and wife.   - He has stage III, UISS high risk clear-cell renal cell carcinoma with approximately 40-50% risk of recurrence without after definitive surgery.  - At this time, he has no clear evidence of residual disease.  The retroperitoneal lymphadenopathy is more consistent with metastatic prostate cancer.  - I discussed the available options for adjuvant treatment of renal cell carcinoma including the role of sunitinib based on S-TRAC trial.  At best, the data for adjuvant TKI therapy is mixed and shows an improvement in disease-free survival and not overall survival.   - At this time, given the concurrent diagnosis of metastatic prostate cancer, and my recommendations to not pursue adjuvant TKI therapy.  - Patient is agreeable with this recommendation and we will put him on active surveillance  for RCC diagnosis.   - He understands that active surveillance involves self-reporting for signs/symptoms of recurrence (explained in detail) and periodic H&P and scans.     Genetics referral:  - Most striking is the finding of 2 concurrent malignancies in this relatively young gentleman with a very high risk family history of malignancies.  While biologically there is a little overlap between prostate cancer and renal cancer, this can be seen in some inherited syndromes.  If present, such an aberration could have implications for his prostate cancer biology as well as clinical trajectory.  - Has been seen by Genetics clinic on 3/7/19. Defer further workup and management to them.     Iatrogenic, severe contact dermatitis:  - Started ~48-72h after exposure to unknown chemical. Significant symptoms and involvement of >30% BSA without features of SJS/TEN/DRESS.   - Not using any new drugs/wound dressings currently.   - Will treat with medrol dose-jann and BID hydroxyzine for 5 days.   - Patient advised to monitor for worsening rash, features of SJS/TEN, and fever etc and seek immediate medical attention if any occur.     Return to see me in 1 month.     Patient and family were very appreciative of the care provided and in complete agreement this plan.  All questions were answered.    BILLIN.    Uday Smalls M.D.  . Professor of Medicine  Genitourinary Oncology  Division of Hematology, Oncology & Transplantation  [facility]

## 2019-03-26 NOTE — LETTER
3/26/2019       RE: Walter Reyes  [address]  [facility] [address]     Dear Colleague,    Thank you for referring your patient, Walter Reyes, to the [facility]. Please see a copy of my visit note below.    MEDICAL ONCOLOGY CLINIC NOTE    REFERRING PROVIDER: Jorge Alberto Yepez MD at [facility].    REASON FOR CURRENT VISIT:   1. Evaluation while on ADT for metastatic prostate cancer.  2. Discussion of treatment options for stage 3 RCC.     HISTORY OF PRESENT ILLNESS:  Mr. 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 His oncologic history is detailed below.  His wife, Micheline, accompanies him for this visit.    Since last visit, Walter has stopped Casodex and undergone a robotic right nephrectomy with Dr. Cleveland on 03/19/19. The surgery was uneventful and he is recovering well from wound healing standpoint. Not requiring oxycodone and only using tylenol for pain control. Good bladder/bowel functions. No hematuria.     No treatment related side effects from ADT except for hot flashes (grade 1) at thsi time. No bone pain, hematuria, SOA/cough/CP etc. Chronic obstructive urinary symptoms unchanged.     He noticed a rash on the right lower back as well as the right flank region past Saturday and the rash has not increased in size.  It is pruritic and erythematous.  No fever, mucositis, skin peeling, hand-foot syndrome etc.  Only reported allergy is doxycycline to which he has GI upset. He was exposed to several new medications including antibiotic and wound care products while hospitalized within the past week, but is not on any new medication or using a wound care product currently.     ONCOLOGIC HISTORY:  1. Prostate adenocarcinoma, stage IV (M1b at diagnosis), high-volume, castration-sensitive:  - 12/13/2018: PSA found to be elevated to 9.1 ng/mL on  "a routine follow-up with primary care provider Dr. Naylor at [facility]. Prior PSA were 1.4 on 8/16/17, 2.4 on 6/28/16, 2.9 on 6/28/16, and as low as 0.4 on 3/26/2003.   - 1/08/2019: Consultation with Sarah Wilson CNP in Urology clinic. Repeat PSA 9.6.  - 1/16/2019: MRI prostate with contrast - \"This examination is characterized as PIRADS 5- very high probability. Clinically significant cancer is highly likely to be present. There is a large, invasive mass arising from the right peripheral zone and extending into the neurovascular bundle, seminal vesicle, and along the anterolateral right mesorectal fascia. Metastatic right external iliac lymph node. Metastatic lesion in the posterior/superior right acetabulum.\"  - 1/25/2019: CT abdomen and pelvis with IV contrast - \"1. Heterogeneous enhancing mass posteriorly in the upper pole of the right kidney measures 4.5 x 5.8 x 5.7 cm (AP by transverse by craniocaudal). It has a small nodular component extending posterior medially which abuts the right psoas muscle. This nodular extension measures 2.1 x 2.1 cm. Minimal stranding about the mass. No definite thrombus within the right renal vein. This renal mass is compatible with a renal cell carcinoma. A paraaortic lymph node situated immediately posterior to the left renal vein measures 1.1 cm in short axis, suspicious for metastasis. 2. A 2 cm right external iliac lymph node is also suspicious for metastases. 3. Multiple sclerotic osseous lesions suspicious for metastases. These include 0.8 and 0.6 cm sclerotic lesions laterally in the right iliac wing (images 53 and 62 respectively). Ill-defined groundglass density laterally in the right acetabulum measuring 1.7 x 1.2 cm corresponds with the lesion identified on MRI. A 0.4 cm groundglass density in the left acetabulum. Sclerotic lesion in the left femoral neck measures 0.9 x 1.6 cm (image 81). Sclerotic metastases would be more compatible with prostate metastases. " "4. A 3 subcentimeter hepatic lesions are indeterminate. Metastases would be a consideration. These can be further characterized with liver MRI.\"  - 1/25/2019: NM bone scan - \"There is focal bony uptake in the left femoral neck, right acetabulum, right of midline at the S1 or L5 level of the spine, within multiple bilateral ribs, in the C7 or T1 level of the spine, and anteriorly within the skull. Findings are suspicious for metastases.\"  - 1/31/19: CT chest with contrast - \" No suspicious nodules in the chest.  Stable appearance of a 5.8 cm right renal mass concerning for renal carcinoma until proven otherwise.  Stable indeterminant subcentimeter hypodensities in the liver.  Multifocal osteoblastic metastasis including 2.5 cm lesion in the right fifth rib posteriorly and a 1.6 cm lesion in the right third rib posteriorly. No lytic lesions identified.\"  - 2/6/19: CT-guided right sclerotic fifth rib lesion biopsy - \"Metastatic carcinoma, consistent with prostate primary.  Immunohistochemical stains performed show the metastatic carcinoma stains positive for NKX3.1 (prostate marker), negative for STACIE 3 and PAX8, which supports the above diagnosis.\"  - 2/15/19: Started Casodex 50mg every day and consented for the biobanking protocol. 3/18/19 - stopped Casodex.  - 2/19/19: Case discussed in tumor board - recommendation for nephectomy for suspected malignant right renal mass.   - 02/26/19: Started Lupron 22.5mg every 3 months.    2. Stage III ([id number]), grade 3 of 4, clear-cell RCC of right kidney:  - Incidentally diagnosed as above.   - Underwent curative-intent robotic right radical nephrectomy with Dr. Wesley Cleveland on 3/19/19. No tumor spillage per op report.   - Path showed: \"Histologic Type: Clear cell renal cell carcinoma; Sarcomatoid Features: Not identified; Histologic Grade: Nucleolar grade 3 (WHO/ISUP). Extent Tumor Size: 4.3 cm. Microscopic Tumor Extension: Tumor extension into renal sinus (in vascular " "structures). Margins: Negative. Tumor Necrosis: Present; focal. Lymph-Vascular Invasion: Not identified.  Pathologic Staging (pTNM) Primary Tumor (pT): pT3a: Tumor extends into renal vein branches/renal sinus. Regional Lymph Nodes (pN): pNX. Number of Lymph Nodes Examined: 0 Distant Metastasis (pM): pM N/A.\"    REVIEW OF SYSTEMS: 14 point ROS negative other than the symptoms noted above in the HPI.    PAST MEDICAL HISTORY:  1. Prostate cancer.  2. Hypothyroidism.  3. Allergic rhinitis.  4. Esophageal reflux disorder.  5. Dyslipidemia.  6. Glucose intolerance.  7. Obesity.     PAST SURGICAL HISTORY:   As above.    SOCIAL HISTORY:   He is  and lives with his wife, Micheline in [place]. He has 4 children and 3 sisters (2 elder brothers  from heart failure). Denies tobacco, alcohol or illicit drug use. 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    FAMILY HISTORY:   He does have a remarkable family history with several maternal relatives affected with cancer.  He has 5 siblings including one sister with skin cancer but no other malignancies.  2 of his brothers passed away at age 68 each because of smoking alcohol use and illicit drug use related heart failure.  Several maternal aunts with lymphoma, colon, liver, breast, and lung cancer, as well as a maternal uncle with lung cancer. His paternal uncle had stomach cancer.     ALLERGIES:   Allergies   Allergen Reactions     Doxycycline GI Disturbance     CURRENT MEDICATIONS:   Current Outpatient Medications:      atorvastatin (LIPITOR) 20 MG tablet, Take 20 mg by mouth daily, Disp: , Rfl:      calcium citrate-vitamin D (CITRACAL) 315-250 MG-UNIT TABS per tablet, Take 650 mg by mouth 2 times daily , Disp: , Rfl:      cetirizine (ZYRTEC) 10 MG tablet, Take 10 mg by mouth as needed, Disp: , Rfl:      cinnamon 500 MG CAPS, Take 500 mg by mouth daily , Disp: , Rfl:      cycloSPORINE (RESTASIS) 0.05 % ophthalmic emulsion, Place " "1 drop into both eyes 2 times daily , Disp: , Rfl:      fluticasone (FLONASE) 50 MCG/ACT nasal spray, Spray 2 sprays in nostril daily as needed , Disp: , Rfl:      Glucosamine-Chondroit-Vit C-Mn (GLUCOSAMINE 1500 COMPLEX) CAPS, Take 1 capsule by mouth daily, Disp: , Rfl:      hydrOXYzine (ATARAX) 25 MG tablet, Take 1 tablet (25 mg) by mouth 2 times daily For 5 days for rash., Disp: 10 tablet, Rfl: 0     levothyroxine (SYNTHROID/LEVOTHROID) 125 MCG tablet, Take 125 mcg by mouth daily, Disp: , Rfl:      methylPREDNISolone (MEDROL DOSEPAK) 4 MG tablet therapy pack, Follow Package Directions, Disp: 21 tablet, Rfl: 0     Multiple Vitamins-Minerals (MULTIVITAMIN ADULT EXTRA C PO), Take 1 tablet by mouth every 24 hours, Disp: , Rfl:      Nutritional Supplements (SALMON OIL) CAPS, Take 1 capsule by mouth daily, Disp: , Rfl:      omeprazole (PRILOSEC) 20 MG DR capsule, Take 20 mg by mouth daily, Disp: , Rfl:      oxyCODONE (ROXICODONE) 5 MG tablet, Take 1-2 tablets (5-10 mg) by mouth every 3 hours as needed, Disp: 15 tablet, Rfl: 0     senna-docusate (SENOKOT-S/PERICOLACE) 8.6-50 MG tablet, Take 1 tablet by mouth 2 times daily as needed for constipation, Disp: 20 tablet, Rfl: 0     bicalutamide (CASODEX) 50 MG tablet, Take 1 tablet (50 mg) by mouth daily, Disp: 30 tablet, Rfl: 0    PHYSICAL EXAMINATION:  Vital signs: /88 (BP Location: Right arm, Patient Position: Sitting)   Pulse 50   Temp 98.6  F (37  C) (Oral)   Resp 18   Ht 1.829 m (6' 0.01\")   Wt 105.9 kg (233 lb 6.4 oz)   SpO2 97%   BMI 31.65 kg/m     ECOG performance status of 1. Fatigue mild.  GENERAL: Well-nourished healthy-appearing man in chair, no acute distress.   HEENT: No icterus, no pallor. Moist mucous membranes. Oropharynx is clear. No mucosal abnormalities.  NECK: Supple, no JVD/LAD.  LUNGS: Clear to ausculation bilaterally, normal work of breathing.   CARDIOVASCULAR: Regular rate and rhythm, no murmurs, gallops or rubs.   ABDOMEN: Soft, " "nontender and nondistended, no palpable masses, bowel sounds present.  EXTREMITIES: No cyanosis, no clubbing, no edema.   NEUROLOGIC: No focal deficits, CN 2-12 intact.  LYMPH NODE EXAM: No palpable adenopathy - cervical, axillary or inguinal.   SKIN: Erythematous, maculopapular, confluent, pruritic rash covering the entirety of upper and lower back extending up to the gluteal fold and bilaterally into the flank regions, involving approximately 30-35% body surface area.    LABORATORY DATA:  Lab Test 03/26/19  1604 03/20/19  0612 03/19/19  1543 02/26/19  1425 02/15/19  1044   WBC 7.4 11.1* 12.0* 6.5 5.1   RBC 4.86 4.77 5.01 5.11 5.05   HGB 13.4 13.4 14.0 14.1 14.7   HCT 42.5 42.4 44.2 45.3 43.1   MCV 87 89 88 89 85   MCH 27.6 28.1 27.9 27.6 29.1   MCHC 31.5 31.6 31.7 31.1* 34.1   RDW 13.3 13.8 13.4 13.8 13.7    191 167 231 215   NEUTROPHIL 58.3  --   --  55.7 49.3    135 140 140 140   POTASSIUM 3.9 4.3 4.4 3.6 3.9   CHLORIDE 107 105 107 108 108   CO2 26 25 29 26 25   ANIONGAP 7 5 4 6 7   GLC 96 112* 121* 87 86   BUN 23 18 13 16 15   CR 1.37* 1.31* 0.94 0.87 0.78   BETHANY 9.2 9.0 8.6 8.5 8.6   PROTTOTAL 7.9  --   --  7.6 7.4   ALBUMIN 3.5  --   --  3.8 3.6   BILITOTAL 0.3  --   --  0.3 0.5   ALKPHOS 116  --   --  98 91   AST 27  --   --  23 21   ALT 54  --   --  26 26     Lab Test 03/26/19  1604 02/26/19  1425 02/15/19  1044   PSA 1.25 8.00* 12.30*   Testo total              222  ChromoA          475    IMAGING STUDIES:  As above.    ASSESSMENT AND PLAN: Mr. Reyes is a delightful 58-year-old gentleman with newly diagnosed metastatic castration sensitive prostate adenocarcinoma as well as a stage III clear-cell renal cell carcinoma of right kidney, who is here for a follow-up visit.     Metastatic prostate cancer:   - Patient meets the criteria for CHAARTED \"high-volume\" metastatic hormone-sensitive prostate cancer.  - I do not have a clinical trial for this gentleman at this time, especially given " the suspicion for a concurrent malignancy.  He has enrolled in the institutional biobanking study on 2/15/19 - this is for non-interventional study.   - We again reviewed systemic therapy options for newly-diagnosed metastatic hormone-sensitive prostate cancer including GNRH agonist versus antagonist by itself versus GNRH agonist/antagonist in combination with docetaxel (CHAARTED, GETUG-AFU15, STAMPEDE) or abiraterone (STAMPEDE, LATTITUDE) based on his disease volume and preference in terms of therapeutic options.   - He wants to pursue chemohormonal therapy after understanding the options.   - Continue Lupron 22.5mg every 3 monthly for now - next dose in end of May 2019.  - Plan to start docetaxel in early/mid May 2019 depending on recovery from the nephrectomy surgery.   - Will repeat restaging scans in late April.   - He understands the risks and benefits of ADT including hot flashes, mood changes and long-term cardiovascular, bone health, etc. Also understands the risks of docetaxel treatment including fatigue, nausea, vomiting, diarrhea/constipation, hand-foot syndrome, allergic reactions, myelosuppression with increased risk of infection and bleeding etc.  - Will also add antiresorptive therapy after mgmt of renal mass.     Stage 3, UISS-high risk ccRCC:  - I had a detailed conversation regarding op report and pathology findings from the nephrectomy surgery with patient and wife.   - He has stage III, UISS high risk clear-cell renal cell carcinoma with approximately 40-50% risk of recurrence without after definitive surgery.  - At this time, he has no clear evidence of residual disease.  The retroperitoneal lymphadenopathy is more consistent with metastatic prostate cancer.  - I discussed the available options for adjuvant treatment of renal cell carcinoma including the role of sunitinib based on S-TRAC trial.  At best, the data for adjuvant TKI therapy is mixed and shows an improvement in disease-free  survival and not overall survival.   - At this time, given the concurrent diagnosis of metastatic prostate cancer, and my recommendations to not pursue adjuvant TKI therapy.  - Patient is agreeable with this recommendation and we will put him on active surveillance for RCC diagnosis.   - He understands that active surveillance involves self-reporting for signs/symptoms of recurrence (explained in detail) and periodic H&P and scans.     Genetics referral:  - Most striking is the finding of 2 concurrent malignancies in this relatively young gentleman with a very high risk family history of malignancies.  While biologically there is a little overlap between prostate cancer and renal cancer, this can be seen in some inherited syndromes.  If present, such an aberration could have implications for his prostate cancer biology as well as clinical trajectory.  - Has been seen by Genetics clinic on 3/7/19. Defer further workup and management to them.     Iatrogenic, severe contact dermatitis:  - Started ~48-72h after exposure to unknown chemical. Significant symptoms and involvement of >30% BSA without features of SJS/TEN/DRESS.   - Not using any new drugs/wound dressings currently.   - Will treat with medrol dose-jann and BID hydroxyzine for 5 days.   - Patient advised to monitor for worsening rash, features of SJS/TEN, and fever etc and seek immediate medical attention if any occur.     Return to see me in 1 month.     Patient and family were very appreciative of the care provided and in complete agreement this plan.  All questions were answered.    BILLIN.    Uday Smalls M.D.  . Professor of Medicine  Genitourinary Oncology  Division of Hematology, Oncology & Transplantation  [facility]

## 2019-03-26 NOTE — NURSING NOTE
"Oncology Rooming Note    March 26, 2019 4:23 PM   Walter Reyes is a 59 year old male who presents for:    Chief Complaint   Patient presents with     Blood Draw     Venipuncture labs collected by RN.      RECHECK     ONC Prostate Cancer 1 month f/up     Initial Vitals: /88 (BP Location: Right arm, Patient Position: Sitting)   Pulse 50   Temp 98.6  F (37  C) (Oral)   Resp 18   Ht 1.829 m (6' 0.01\")   Wt 105.9 kg (233 lb 6.4 oz)   SpO2 97%   BMI 31.65 kg/m   Estimated body mass index is 31.65 kg/m  as calculated from the following:    Height as of this encounter: 1.829 m (6' 0.01\").    Weight as of this encounter: 105.9 kg (233 lb 6.4 oz). Body surface area is 2.32 meters squared.  No Pain (0) Comment: Data Unavailable   No LMP for male patient.  Allergies reviewed: Yes  Medications reviewed: Yes    Medications: Medication refills not needed today.  Pharmacy name entered into EPIC: [facility] [place], [place] - [address] [address]    Clinical concerns: none        Luz Danilo, LISSETTE              "

## 2019-03-27 LAB — INTERPRETATION ECG - MUSE: NORMAL

## 2019-03-29 LAB — CGA SERPL-MCNC: 900 NG/ML (ref 0–95)

## 2019-04-01 ENCOUNTER — OFFICE VISIT (OUTPATIENT)
Dept: UROLOGY | Facility: CLINIC | Age: 59
End: 2019-04-01
Payer: COMMERCIAL

## 2019-04-01 VITALS
WEIGHT: 232 LBS | BODY MASS INDEX: 31.42 KG/M2 | DIASTOLIC BLOOD PRESSURE: 80 MMHG | SYSTOLIC BLOOD PRESSURE: 152 MMHG | HEIGHT: 72 IN

## 2019-04-01 DIAGNOSIS — C61 MALIGNANT NEOPLASM OF PROSTATE METASTATIC TO BONE (H): ICD-10-CM

## 2019-04-01 DIAGNOSIS — C79.51 MALIGNANT NEOPLASM OF PROSTATE METASTATIC TO BONE (H): ICD-10-CM

## 2019-04-01 DIAGNOSIS — C64.1 RENAL CELL CARCINOMA, RIGHT (H): Primary | ICD-10-CM

## 2019-04-01 PROCEDURE — 99024 POSTOP FOLLOW-UP VISIT: CPT | Performed by: UROLOGY

## 2019-04-01 ASSESSMENT — PAIN SCALES - GENERAL: PAINLEVEL: MILD PAIN (2)

## 2019-04-01 ASSESSMENT — MIFFLIN-ST. JEOR: SCORE: 1915.35

## 2019-04-01 NOTE — NURSING NOTE
Chief Complaint   Patient presents with     Renal Mass     Patient here today to go over his Path result       Blood pressure 152/80, height 1.829 m (6'), weight 105.2 kg (232 lb). Body mass index is 31.46 kg/m .    Patient Active Problem List   Diagnosis     Allergic rhinitis     Dyslipidemia     Esophageal reflux     Glucose intolerance     Hypothyroidism     Obesity     Malignant neoplasm of prostate metastatic to bone (H)     Right renal mass       Allergies   Allergen Reactions     Doxycycline GI Disturbance       Current Outpatient Medications   Medication Sig Dispense Refill     atorvastatin (LIPITOR) 20 MG tablet Take 20 mg by mouth daily       calcium citrate-vitamin D (CITRACAL) 315-250 MG-UNIT TABS per tablet Take 650 mg by mouth 2 times daily        cetirizine (ZYRTEC) 10 MG tablet Take 10 mg by mouth as needed       cinnamon 500 MG CAPS Take 500 mg by mouth daily        cycloSPORINE (RESTASIS) 0.05 % ophthalmic emulsion Place 1 drop into both eyes 2 times daily        fluticasone (FLONASE) 50 MCG/ACT nasal spray Spray 2 sprays in nostril daily as needed        Glucosamine-Chondroit-Vit C-Mn (GLUCOSAMINE 1500 COMPLEX) CAPS Take 1 capsule by mouth daily       levothyroxine (SYNTHROID/LEVOTHROID) 125 MCG tablet Take 125 mcg by mouth daily       Multiple Vitamins-Minerals (MULTIVITAMIN ADULT EXTRA C PO) Take 1 tablet by mouth every 24 hours       Nutritional Supplements (SALMON OIL) CAPS Take 1 capsule by mouth daily       omeprazole (PRILOSEC) 20 MG DR capsule Take 20 mg by mouth daily       bicalutamide (CASODEX) 50 MG tablet Take 1 tablet (50 mg) by mouth daily 30 tablet 0     hydrOXYzine (ATARAX) 25 MG tablet Take 1 tablet (25 mg) by mouth 2 times daily For 5 days for rash. (Patient not taking: Reported on 4/1/2019) 10 tablet 0     methylPREDNISolone (MEDROL DOSEPAK) 4 MG tablet therapy pack Follow Package Directions (Patient not taking: Reported on 4/1/2019) 21 tablet 0     oxyCODONE (ROXICODONE) 5 MG  tablet Take 1-2 tablets (5-10 mg) by mouth every 3 hours as needed (Patient not taking: Reported on 4/1/2019) 15 tablet 0     senna-docusate (SENOKOT-S/PERICOLACE) 8.6-50 MG tablet Take 1 tablet by mouth 2 times daily as needed for constipation (Patient not taking: Reported on 4/1/2019) 20 tablet 0       Social History     Tobacco Use     Smoking status: Never Smoker     Smokeless tobacco: Never Used   Substance Use Topics     Alcohol use: Not on file     Comment: wine - very rare     Drug use: No       Solange Herrera MA  4/1/2019

## 2019-04-01 NOTE — LETTER
[facility] [place]  [address]  [address]  [facility] [id number] [phone]          April 1, 2019    RE:  Walter Reyes                                                                                                                                                       [id number] [facility] [address]            To whom it may concern:    Walter Booth Dannyhi is under my professional care for recent surgery. He may return to work as of 4/1/2019. He has lifting and activity restrictions in place until 5/1/2019.      Sincerely,          Wesley Cleveland MD  Urology  [facility]  Clinic Phone [phone]

## 2019-04-01 NOTE — PROGRESS NOTES
CHIEF COMPLAINT   It was my pleasure to see Walter Reyes who is a 59 year old male for follow-up of renal cell carcinoma.      HPI   Walter Reyes is a very pleasant 59 year old male who presents with a history of renal cell carcinoma. Now s/p right lap nephrectomy 3/19/2019. Recovering well and without complication. Also with metastatic prostate cancer and follows with Dr. Smalls. Patient found to have Grade 3 clear cell RCC, stage pT3a.     Specimen        Procedure:    Radical nephrectomy        Specimen Laterality:   Right        Tumor Site:   Upper pole        Tumor Focality:   Unifocal        Macroscopic Extent of Tumor:   Tumor extension into renal sinus     Tumor        Histologic Type:   Clear cell renal cell carcinoma        Sarcomatoid Features:   Not identified        Histologic Grade:    Nucleolar grade 3 (WHO/ISUP).     Extent        Tumor Size:   4.3 cm.        Microscopic Tumor Extension:   Tumor extension into renal sinus (in   vascular structures).     Margins:   Negative.     Tumor Necrosis:   Present; focal.   Lymph-Vascular Invasion:   Not identified     Pathologic Staging (pTNM)        Primary Tumor (pT):    pT3a: Tumor extends into renal vein   branches/renal sinus.        Regional Lymph Nodes (pN):   pNX.             Number of Lymph Nodes Examined:   0        Distant Metastasis (pM):    pM N/A.     PHYSICAL EXAM  Patient is a 59 year old  male   Vitals: Blood pressure 152/80, height 1.829 m (6'), weight 105.2 kg (232 lb).  General Appearance Adult: Body mass index is 31.46 kg/m .  Alert, no acute distress, oriented  HENT: throat/mouth:normal, good dentition  Lungs: no respiratory distress, or pursed lip breathing  Heart: No obvious jugular venous distension present  Abdomen: soft, nontender, no organomegaly or masses; incisions healing well  Back: no CVAT  Musculoskeltal: extremities normal, no peripheral edema  Skin: no suspicious lesions or rashes  Neuro: Alert,  oriented, speech and mentation normal  Psych: affect and mood normal  Gait: Normal    ASSESSMENT and PLAN  59 year old male with pT3a clear cell renal cell carcinoma, now s/p right nephrectomy completed 3/19/2019. Doing well. Discussed his pathology today and reviewed information as already discussed with him by Dr. Smalls. He also has metastatic prostate cancer and is planning to start chemotherapy in the coming weeks in consultation with Dr. Smalls. At this point, from an RCC perspective, I would like imaging and labs in about 3 months. Will coordinate this with Dr. Smalls to avoid duplicate scans.    - Follow-up 3 months with CT abd/pelvis, CXR, and labs  - Follow-up with Dr. Smalls, as scheduled    Wesley Cleveland MD  Urology  [facility]  Clinic Phone [phone]

## 2019-04-02 ENCOUNTER — MYC MEDICAL ADVICE (OUTPATIENT)
Dept: ONCOLOGY | Facility: CLINIC | Age: 59
End: 2019-04-02

## 2019-04-02 DIAGNOSIS — I10 HTN (HYPERTENSION): Primary | ICD-10-CM

## 2019-04-02 LAB — TESTOST SERPL-MCNC: 22 NG/DL (ref 240–950)

## 2019-04-02 RX ORDER — AMLODIPINE BESYLATE 10 MG/1
10 TABLET ORAL DAILY
Qty: 30 TABLET | Refills: 0 | Status: SHIPPED | OUTPATIENT
Start: 2019-04-02 | End: 2023-01-09 | Stop reason: ALTCHOICE

## 2019-04-02 NOTE — TELEPHONE ENCOUNTER
I would recommend starting a calcium channel blocker - amlodipine 10mg every day to get his BP lower. Needs to be advised on possible side effects. I'm able to send an Rx to get him started and further mgmt can be done by PCP's office.     Low HR at baseline doesn't allow for a beta blocker. He will have some dehydration while going through chemo and hence other classes of HTN meds are not as appropriate.     Thanks,   AR     Above information relayed to pt. Discussed side effects of lightheadedness, dizziness, swelling of extremities, nausea, sweating. Take in the morning with a meal and continue checking BP twice a day at equal intervals. If he develops above side effects, recheck BP and call the clinic with reading. Any side effects such as coughing, wheezing, throat tightness, new chest pain pt to call 911. Rx sent for 30 days to [facility] [place] and pt advised to update pcp. Pt verbalized understanding.

## 2019-04-02 NOTE — TELEPHONE ENCOUNTER
Spoke with pt regarding BP/HR. Per Dr. Smalls, elevated BP can occur after nephrectomy.    Pt report following numbers:    3/30 at 9:26 am 136/87 HR 55  3/29 at 5:28 pm 142/90 HR 54  3/29 at 7:20 am 157/96 HR 51  3/28 at 3:29 pm 130/88 HR 48  3/28 at 8:12 am 143/92 HR 52  3/27 at 6:00 am 149/100 HR 62    Pt stated BP was starting to elevate in December, prior to his nephrectomy and Pcp expressed concern then about hyperlipidemia and wanted pt to lose weight. He has now lost 10 lbs since then. Denied any symptoms when BP is elevated, has chronic ringing in the ears.  Not currently on any BP meds and was supposed to have lipids rechecked at pcp last month but hasn't gone in yet. Expecting to start chemo mid-May after he follow-up with Dr. Smalls 4/30

## 2019-04-03 ENCOUNTER — ONCOLOGY VISIT (OUTPATIENT)
Dept: ONCOLOGY | Facility: CLINIC | Age: 59
End: 2019-04-03
Attending: GENETIC COUNSELOR, MS
Payer: COMMERCIAL

## 2019-04-03 DIAGNOSIS — Z80.9 FAMILY HISTORY OF CANCER: ICD-10-CM

## 2019-04-03 DIAGNOSIS — C79.51 PROSTATE CANCER METASTATIC TO BONE (H): Primary | ICD-10-CM

## 2019-04-03 DIAGNOSIS — C64.1 CLEAR CELL RENAL CELL CARCINOMA, RIGHT (H): ICD-10-CM

## 2019-04-03 DIAGNOSIS — C61 PROSTATE CANCER METASTATIC TO BONE (H): Primary | ICD-10-CM

## 2019-04-03 PROCEDURE — 40000072 ZZH STATISTIC GENETIC COUNSELING, < 16 MIN: Performed by: GENETIC COUNSELOR, MS

## 2019-04-03 NOTE — LETTER
Cancer Risk Management  Program Locations    [facility]  [facility]  Mailing Address  Cancer Risk Management Program  [facility]  [address]  [place] [address]    New patient appointments  [phone]  April 7, 2019    Walter PUENTES Leobardo Vincent  [address] [address]      Dear Walter,    It was a pleasure meeting with you again at [facility] in [place] on 4-3-19.  Here is a copy of the progress note from your recent genetic counseling visit to the Cancer Risk Management Program.  If you have any additional questions, please feel free to call.    Cancer Risk Management Program Genetic Counseling Note    4/3/2019    Referring Provider: Dr. Uday Smalls    Presenting Information:  Walter Booth Dannyhi returned to the Cancer Risk Management Program at [facility] in [place] to discuss his genetic testing results. His blood was drawn on 3-5-19, and a CancerNext-Expanded genetic testing panel was ordered from [facility]. This testing was done because of his personal history of metastatic prostate cancer and a renal mass and family history of other relatives with cancer.  (Since we first met, Walter  Has had his right kidney removed and pathology confirmed that his renal mass was a clear cell renal carcinoma.)  Walter was accompanied to clinic today by his wife.    Genetic Testing Result: NEGATIVE  No mutations were found in any of the 67 genes analyzed.  Walter is negative for mutations in the AIP, ALK, APC, TRACY, BAP1, BARD1, BLM, BRCA1, BRCA2, BRIP1, BMPR1A, CDH1, CDK4, CDKN1B, CDKN2A, CHEK2, DICER1, EPCAM, FANCC, FH, FLCN, GALNT12, GREM1, HOXB13, MAX, MEN1, MET, MITF, MLH1, MRE11A, MSH2, MSH6, MUTYH, NBN, NF1, NF2, PALB2, PHOX2B, PMS2, POLD1, POLE,  "POT1, HPGRV0C, PTCH1, PTEN, RAD50, RAD51C, RAD51D, RB1, RET, SDHA, SDHAF2, SDHB, SDHC, SDHD, SMAD4, SMARCA4, SMARCB1, SMARCE1, STK11, SUFU, MPIP148, TP53, TSC1, TSC2, VHL, and XRCC2 genes.  This test involved sequencing and deletion/duplication analysis of all genes with the exceptions of EPCAM and GREM1 (deletions/duplications only) and MITF (only the status of the c.952G>A (p.E318K) alteration is analyzed and reported).    Therefore, testing did not detect an identifiable mutation associated with Hereditary Breast and Ovarian Cancer syndrome (BRCA1, BRCA2), Vasquez syndrome (MLH1, MSH2, MSH6, PMS2, EPCAM), Familial Adenomatous Polyposis (APC), Hereditary Diffuse Gastric Cancer (CDH1), Familial Atypical Multiple Mole Melanoma syndrome (CDK4, CDKN2A), Juvenile Polyposis syndrome (BMPR1A, SMAD4), Cowden syndrome (PTEN), Li Fraumeni syndrome (TP53), Peutz-Jeghers syndrome (STK11), MUTYH Associated Polyposis (MUTYH),  Hereditary Leiomyomatosis and Renal Cell Cancer (FH), Cqap-Yqqn-Zaue (FLCN), Hereditary Papillary Renal Carcinoma (MET), Hereditary Paraganglioma and Pheochromocytoma syndrome (SDHA, SDHAF2, SDHB, SDHC, SDHD), Multiple Endocrine Neoplasia type 2 (RET), Tuberous sclerosis complex (TSC1, TSC2), Von Hippel-Lindau disease (VHL), Neurofibromatosis type 1 (NF1), Neurofibromatosis type 2 (NF1), Multiple Endocrine Neoplasia type 1 (MEN1), Multiple Endocrine Neoplasia type 4 (CDKN1B), Gorlin syndrome (SUFU, PTCH1), or Fried complex (JKUWY1N).    A copy of the test report can be found in the Laboratory tab, dated 3-5-19, and named \"[facility]". The report is scanned in as a linked document.    Interpretation:  We discussed several different interpretations of this negative test result.    1. One explanation may be that there is a different gene or combination of genes and environment that are associated with the cancers in Walter and/or his relatives (that could not be detected by this current testing " technology).   2. Another explanation may be that Walter's two cancers arose spontaneously and were not related to a specific genetic risk factor.  3. There is also a small possibility that there is a mutation in one of these genes, and the testing laboratory could not find it with their current testing methods.       Screening:  Based on this negative test result, it is important for Walter and his relatives to refer back to the family history for appropriate cancer screening.  We talked about that Walter should continue to follow-up with his Oncology care teams for treatment and surveillance of his prostate and kidney cancers, as previously recommended.   Walter has been working with his primary care doctor regarding recommended colon cancer screening, and he reports that he plans to have a skin check with his dermatologist soon.    Inheritance:  We reviewed the autosomal dominant inheritance of mutations in most of these cancer susceptibility genes. We discussed that based on his negative test results, we would expect that  Walter cannot/did not pass on an identifiable mutation in these genes to his children based on this test result. Mutations in these genes do not skip generations.      Additional Testing Considerations:  It is possible Walter does carry a gene or combination of genes and environment that increased his risk for that was not identifiable through this test.  Additionally, although Walter's genetic testing result was negative, other relatives may still carry a gene mutation associated with an increased risk for cancer. Genetic counseling is recommended for Walter's relatives with early onset cancers to discuss genetic testing options.  If any of these relatives do pursue genetic testing, Walter is encouraged to contact me so that we may review the impact of their test results on Walter.    Summary:  We do not have an explanation for Gideons prostate and kidney cancers. Because of that, it is  important that he continue with cancer screening based on his personal and family history as discussed above.    Genetic testing is rapidly advancing, and new cancer susceptibility genes will most likely be identified in the future. Therefore, I encouraged Walter to contact me annually or if there are changes in his personal or family history. This may change how we assess his cancer risk, screening, and the testing we would offer.    Plan:  1.  I provided Walter with a copy of his test results today and a handout summarizing negative genetic test results (see after visit summary).  2. He plans to follow-up with his Oncology teams as previously recommended.  3. He should contact me annually, or sooner if his family history changes, and he would like to know what additional testing or screening recommendations there may be for him at that time.    If Walter has any further questions, I encouraged him to contact me at [phone].    Ann Marie Capps MS, [facility]  Genetic Counselor  Ph: [phone]    Time spent face to face: 15 minutes

## 2019-04-03 NOTE — PATIENT INSTRUCTIONS
Negative Genetic Test Result    Genetic Testing  You had a blood test that looked at the genetic information in one or more genes associated with increased cancer risk.  The testing looked for any harmful changes that would stop this particular gene from working like it should. If an individual does not have any harmful changes or variants of unknown significance found from their blood test, their genetic test result is reported as negative.       Results  The genetic test did not identify any pathogenic (harmful) changes in the genes that were tested. There are several possible explanations for a negative test result. Without knowing the gene mutation in your family, the cause of the cancer in you or your relatives is still unknown. Your genetic counselor can help interpret the result for you and your relatives. In this case, there are several reasons that may explain the negative test result:    There may be a gene mutation in the family that you did not inherit.     You may have a gene mutation in a different gene that was not included in the test, or has not yet been discovered.     The cancers in you or your family may be due to a combination of genetic factors and environment (multifactorial/familial).    The cancers in you or your family may be sporadic/random cancers.    There is very small chance that a mutation was not found by current testing methods.  As testing technology evolves over time, it may still be possible to identify a mutation in a gene that was not found on this test.    It is important to note which genes were included in your test. A list of these genes can be found on your test result.    Screening Recommendations  Due to this negative test result, cancer screening recommendations should be based on your personal and family history. This may include increased cancer screening for you and/or your family members. Your genetic counselor and health care provider can help make  appropriate recommendations.      Please call us if you have any questions or concerns.   Cancer Risk Management Program [phone] ([phone])  ? Emilee Lewis, MS, [facility]  [phone]  ? Ann Marie Capps, MS, [facility]  [phone]  ? Aviva Damon, MS, [facility]  [phone]  ? Bina Kong, MS, [facility]  [phone]  ? Alpa Vasquez, MS, [facility] [phone]

## 2019-04-03 NOTE — PROGRESS NOTES
Cancer Risk Management Program Genetic Counseling Note    4/3/2019    Referring Provider: Dr. Uday Smalls    Presenting Information:  Walter Reyes returned to the Cancer Risk Management Program at [facility] in [place] to discuss his genetic testing results. His blood was drawn on 3-5-19, and a CancerNext-Expanded genetic testing panel was ordered from [facility]. This testing was done because of his personal history of metastatic prostate cancer and a renal mass and family history of other relatives with cancer.  (Since we first met, Walter  Has had his right kidney removed and pathology confirmed that his renal mass was a clear cell renal carcinoma.)  Walter was accompanied to clinic today by his wife.    Genetic Testing Result: NEGATIVE  No mutations were found in any of the 67 genes analyzed.  Walter is negative for mutations in the AIP, ALK, APC, TRACY, BAP1, BARD1, BLM, BRCA1, BRCA2, BRIP1, BMPR1A, CDH1, CDK4, CDKN1B, CDKN2A, CHEK2, DICER1, EPCAM, FANCC, FH, FLCN, GALNT12, GREM1, HOXB13, MAX, MEN1, MET, MITF, MLH1, MRE11A, MSH2, MSH6, MUTYH, NBN, NF1, NF2, PALB2, PHOX2B, PMS2, POLD1, POLE, POT1, ADGRI3L, PTCH1, PTEN, RAD50, RAD51C, RAD51D, RB1, RET, SDHA, SDHAF2, SDHB, SDHC, SDHD, SMAD4, SMARCA4, SMARCB1, SMARCE1, STK11, SUFU, SMVH146, TP53, TSC1, TSC2, VHL, and XRCC2 genes.  This test involved sequencing and deletion/duplication analysis of all genes with the exceptions of EPCAM and GREM1 (deletions/duplications only) and MITF (only the status of the c.952G>A (p.E318K) alteration is analyzed and reported).    Therefore, testing did not detect an identifiable mutation associated with Hereditary Breast and Ovarian Cancer syndrome (BRCA1, BRCA2), Vasquez syndrome (MLH1, MSH2, MSH6, PMS2, EPCAM), Familial Adenomatous Polyposis (APC), Hereditary Diffuse Gastric Cancer (CDH1), Familial Atypical Multiple Mole Melanoma syndrome (CDK4, CDKN2A), Juvenile Polyposis syndrome (BMPR1A, SMAD4),  "Cowden syndrome (PTEN), Li Fraumeni syndrome (TP53), Peutz-Jeghers syndrome (STK11), MUTYH Associated Polyposis (MUTYH),  Hereditary Leiomyomatosis and Renal Cell Cancer (FH), Pxdy-Ciek-Hwui (FLCN), Hereditary Papillary Renal Carcinoma (MET), Hereditary Paraganglioma and Pheochromocytoma syndrome (SDHA, SDHAF2, SDHB, SDHC, SDHD), Multiple Endocrine Neoplasia type 2 (RET), Tuberous sclerosis complex (TSC1, TSC2), Von Hippel-Lindau disease (VHL), Neurofibromatosis type 1 (NF1), Neurofibromatosis type 2 (NF1), Multiple Endocrine Neoplasia type 1 (MEN1), Multiple Endocrine Neoplasia type 4 (CDKN1B), Gorlin syndrome (SUFU, PTCH1), or Fried complex ([id number]).    A copy of the test report can be found in the Laboratory tab, dated 3-5-19, and named \"[facility]". The report is scanned in as a linked document.    Interpretation:  We discussed several different interpretations of this negative test result.    1. One explanation may be that there is a different gene or combination of genes and environment that are associated with the cancers in Walter and/or his relatives (that could not be detected by this current testing technology).   2. Another explanation may be that Walter's two cancers arose spontaneously and were not related to a specific genetic risk factor.  3. There is also a small possibility that there is a mutation in one of these genes, and the testing laboratory could not find it with their current testing methods.       Screening:  Based on this negative test result, it is important for Walter and his relatives to refer back to the family history for appropriate cancer screening.  We talked about that Walter should continue to follow-up with his Oncology care teams for treatment and surveillance of his prostate and kidney cancers, as previously recommended.   Walter has been working with his primary care doctor regarding recommended colon cancer screening, and he reports that he plans to have a skin " check with his dermatologist soon.    Inheritance:  We reviewed the autosomal dominant inheritance of mutations in most of these cancer susceptibility genes. We discussed that based on his negative test results, we would expect that  Walter cannot/did not pass on an identifiable mutation in these genes to his children based on this test result. Mutations in these genes do not skip generations.      Additional Testing Considerations:  It is possible Walter does carry a gene or combination of genes and environment that increased his risk for that was not identifiable through this test.  Additionally, although Walter's genetic testing result was negative, other relatives may still carry a gene mutation associated with an increased risk for cancer. Genetic counseling is recommended for Walter's relatives with early onset cancers to discuss genetic testing options.  If any of these relatives do pursue genetic testing, Walter is encouraged to contact me so that we may review the impact of their test results on Walter.    Summary:  We do not have an explanation for Walter's prostate and kidney cancers. Because of that, it is important that he continue with cancer screening based on his personal and family history as discussed above.    Genetic testing is rapidly advancing, and new cancer susceptibility genes will most likely be identified in the future. Therefore, I encouraged Walter to contact me annually or if there are changes in his personal or family history. This may change how we assess his cancer risk, screening, and the testing we would offer.    Plan:  1.  I provided Walter with a copy of his test results today and a handout summarizing negative genetic test results (see after visit summary).  2. He plans to follow-up with his Oncology teams as previously recommended.  3. He should contact me annually, or sooner if his family history changes, and he would like to know what additional testing or screening  recommendations there may be for him at that time.    If Walter has any further questions, I encouraged him to contact me at [phone].    Ann Marie Capps MS, [facility]  Genetic Counselor  Ph: [phone]    Time spent face to face: 15 minutes

## 2019-04-05 LAB — LAB SCANNED RESULT: NORMAL

## 2019-04-24 ENCOUNTER — MYC MEDICAL ADVICE (OUTPATIENT)
Dept: ONCOLOGY | Facility: CLINIC | Age: 59
End: 2019-04-24

## 2019-04-29 ENCOUNTER — TELEPHONE (OUTPATIENT)
Dept: ONCOLOGY | Facility: CLINIC | Age: 59
End: 2019-04-29

## 2019-04-29 NOTE — TELEPHONE ENCOUNTER
"Triage received refill request for Amlodipine 10mg.     Per 4/2/19 encounter by Dr. Smalls \"I'm able to send an Rx to get him started and further mgmt can be done by PCP's office.\"    Writer contacted patient. Per pt, he has not updated his PCP but will do so now and request refill. If unable to fill through PCP patient will notify Onc triage for further recommendations.     Amalia Unger RN   [facility] Triage    "

## 2019-04-30 ENCOUNTER — APPOINTMENT (OUTPATIENT)
Dept: LAB | Facility: CLINIC | Age: 59
End: 2019-04-30
Attending: INTERNAL MEDICINE
Payer: COMMERCIAL

## 2019-04-30 ENCOUNTER — ONCOLOGY VISIT (OUTPATIENT)
Dept: ONCOLOGY | Facility: CLINIC | Age: 59
End: 2019-04-30
Attending: INTERNAL MEDICINE
Payer: COMMERCIAL

## 2019-04-30 VITALS
TEMPERATURE: 97.9 F | WEIGHT: 244.8 LBS | DIASTOLIC BLOOD PRESSURE: 83 MMHG | HEART RATE: 63 BPM | OXYGEN SATURATION: 97 % | RESPIRATION RATE: 16 BRPM | HEIGHT: 72 IN | BODY MASS INDEX: 33.16 KG/M2 | SYSTOLIC BLOOD PRESSURE: 141 MMHG

## 2019-04-30 DIAGNOSIS — C79.51 MALIGNANT NEOPLASM OF PROSTATE METASTATIC TO BONE (H): Primary | ICD-10-CM

## 2019-04-30 DIAGNOSIS — C61 MALIGNANT NEOPLASM OF PROSTATE METASTATIC TO BONE (H): Primary | ICD-10-CM

## 2019-04-30 LAB
ALBUMIN SERPL-MCNC: 3.9 G/DL (ref 3.4–5)
ALP SERPL-CCNC: 82 U/L (ref 40–150)
ALT SERPL W P-5'-P-CCNC: 58 U/L (ref 0–70)
ANION GAP SERPL CALCULATED.3IONS-SCNC: 8 MMOL/L (ref 3–14)
AST SERPL W P-5'-P-CCNC: 30 U/L (ref 0–45)
BASOPHILS # BLD AUTO: 0 10E9/L (ref 0–0.2)
BASOPHILS NFR BLD AUTO: 0.3 %
BILIRUB SERPL-MCNC: 0.2 MG/DL (ref 0.2–1.3)
BUN SERPL-MCNC: 28 MG/DL (ref 7–30)
CALCIUM SERPL-MCNC: 9.2 MG/DL (ref 8.5–10.1)
CHLORIDE SERPL-SCNC: 106 MMOL/L (ref 94–109)
CO2 SERPL-SCNC: 24 MMOL/L (ref 20–32)
CREAT SERPL-MCNC: 1.33 MG/DL (ref 0.66–1.25)
DIFFERENTIAL METHOD BLD: ABNORMAL
EOSINOPHIL # BLD AUTO: 0.2 10E9/L (ref 0–0.7)
EOSINOPHIL NFR BLD AUTO: 3.9 %
ERYTHROCYTE [DISTWIDTH] IN BLOOD BY AUTOMATED COUNT: 13.3 % (ref 10–15)
GFR SERPL CREATININE-BSD FRML MDRD: 59 ML/MIN/{1.73_M2}
GLUCOSE SERPL-MCNC: 96 MG/DL (ref 70–99)
HCT VFR BLD AUTO: 39.7 % (ref 40–53)
HGB BLD-MCNC: 13.4 G/DL (ref 13.3–17.7)
IMM GRANULOCYTES # BLD: 0 10E9/L (ref 0–0.4)
IMM GRANULOCYTES NFR BLD: 0.5 %
LYMPHOCYTES # BLD AUTO: 2.2 10E9/L (ref 0.8–5.3)
LYMPHOCYTES NFR BLD AUTO: 35.6 %
MCH RBC QN AUTO: 28.8 PG (ref 26.5–33)
MCHC RBC AUTO-ENTMCNC: 33.8 G/DL (ref 31.5–36.5)
MCV RBC AUTO: 85 FL (ref 78–100)
MONOCYTES # BLD AUTO: 0.6 10E9/L (ref 0–1.3)
MONOCYTES NFR BLD AUTO: 10 %
NEUTROPHILS # BLD AUTO: 3 10E9/L (ref 1.6–8.3)
NEUTROPHILS NFR BLD AUTO: 49.7 %
NRBC # BLD AUTO: 0 10*3/UL
NRBC BLD AUTO-RTO: 0 /100
PLATELET # BLD AUTO: 248 10E9/L (ref 150–450)
POTASSIUM SERPL-SCNC: 3.8 MMOL/L (ref 3.4–5.3)
PROT SERPL-MCNC: 8 G/DL (ref 6.8–8.8)
PSA SERPL-MCNC: 0.69 UG/L (ref 0–4)
RBC # BLD AUTO: 4.65 10E12/L (ref 4.4–5.9)
SODIUM SERPL-SCNC: 138 MMOL/L (ref 133–144)
WBC # BLD AUTO: 6.1 10E9/L (ref 4–11)

## 2019-04-30 PROCEDURE — 84403 ASSAY OF TOTAL TESTOSTERONE: CPT | Performed by: INTERNAL MEDICINE

## 2019-04-30 PROCEDURE — 86316 IMMUNOASSAY TUMOR OTHER: CPT | Performed by: INTERNAL MEDICINE

## 2019-04-30 PROCEDURE — 85025 COMPLETE CBC W/AUTO DIFF WBC: CPT | Performed by: INTERNAL MEDICINE

## 2019-04-30 PROCEDURE — 99215 OFFICE O/P EST HI 40 MIN: CPT | Mod: ZP | Performed by: INTERNAL MEDICINE

## 2019-04-30 PROCEDURE — 36415 COLL VENOUS BLD VENIPUNCTURE: CPT

## 2019-04-30 PROCEDURE — 80053 COMPREHEN METABOLIC PANEL: CPT | Performed by: INTERNAL MEDICINE

## 2019-04-30 PROCEDURE — G0463 HOSPITAL OUTPT CLINIC VISIT: HCPCS | Mod: ZF

## 2019-04-30 PROCEDURE — 84153 ASSAY OF PSA TOTAL: CPT | Performed by: INTERNAL MEDICINE

## 2019-04-30 RX ORDER — DEXAMETHASONE 4 MG/1
8 TABLET ORAL 2 TIMES DAILY
Qty: 12 TABLET | Refills: 5 | Status: SHIPPED | OUTPATIENT
Start: 2019-04-30 | End: 2019-10-02

## 2019-04-30 RX ORDER — PROCHLORPERAZINE MALEATE 10 MG
10 TABLET ORAL EVERY 6 HOURS PRN
Qty: 30 TABLET | Refills: 5 | Status: SHIPPED | OUTPATIENT
Start: 2019-04-30 | End: 2019-08-23

## 2019-04-30 ASSESSMENT — MIFFLIN-ST. JEOR: SCORE: 1973.54

## 2019-04-30 ASSESSMENT — PAIN SCALES - GENERAL: PAINLEVEL: NO PAIN (0)

## 2019-04-30 NOTE — NURSING NOTE
"Oncology Rooming Note    April 30, 2019 4:39 PM   Walter Reyes is a 59 year old male who presents for:    Chief Complaint   Patient presents with     Blood Draw     Labs drawn via  by RN in lab. VS taken.      Oncology Clinic Visit     F/U Prostate Ca with mets to bone     Initial Vitals: /83 (BP Location: Right arm, Patient Position: Sitting, Cuff Size: Adult Regular)   Pulse 63   Temp 97.9  F (36.6  C) (Oral)   Resp 16   Ht 1.829 m (6' 0.01\")   Wt 111 kg (244 lb 12.8 oz)   SpO2 97%   BMI 33.19 kg/m   Estimated body mass index is 33.19 kg/m  as calculated from the following:    Height as of this encounter: 1.829 m (6' 0.01\").    Weight as of this encounter: 111 kg (244 lb 12.8 oz). Body surface area is 2.37 meters squared.  No Pain (0) Comment: Data Unavailable   No LMP for male patient.  Allergies reviewed: Yes  Medications reviewed: Yes    Medications: Medication refills not needed today.  Pharmacy name entered into EPIC: [facility] [place], [place] - [address] [address]    Clinical concerns: None, Dr Smalls was NOT notified.      JADE SWARTZ LPN            "

## 2019-04-30 NOTE — PROGRESS NOTES
"MEDICAL ONCOLOGY CLINIC NOTE    REFERRING PROVIDER: Jorge Alberto Yepez MD at [facility].    REASON FOR CURRENT VISIT:   1. Evaluation while on ADT for metastatic prostate cancer.  2. Discussion of treatment options for stage 3 RCC.     HISTORY OF PRESENT ILLNESS:  Mr. 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 His oncologic history is detailed below.  His wife, Micheline, accompanies him for this visit.    Walter started ADT in mid Feb 2019 and underwent a robotic right nephrectomy with Dr. Cleveland on 03/19/19. The surgery was uneventful and he has recovered well.     No treatment related side effects from ADT except for hot flashes (grade 1) at this time. No bone pain, hematuria, SOA/cough/CP etc. Chronic obstructive urinary symptoms unchanged.     No signs/symptoms concerning for disease progression.    ONCOLOGIC HISTORY:  1. Prostate adenocarcinoma, stage IV (M1b at diagnosis), high-volume, castration-sensitive:  - 12/13/2018: PSA found to be elevated to 9.1 ng/mL on a routine follow-up with primary care provider Dr. Naylor at [facility]. Prior PSA were 1.4 on 8/16/17, 2.4 on 6/28/16, 2.9 on 6/28/16, and as low as 0.4 on 3/26/2003.   - 1/08/2019: Consultation with Sarah Wilson CNP in Urology clinic. Repeat PSA 9.6.  - 1/16/2019: MRI prostate with contrast - \"This examination is characterized as PIRADS 5- very high probability. Clinically significant cancer is highly likely to be present. There is a large, invasive mass arising from the right peripheral zone and extending into the neurovascular bundle, seminal vesicle, and along the anterolateral right mesorectal fascia. Metastatic right external iliac lymph node. Metastatic lesion in the posterior/superior right acetabulum.\"  - 1/25/2019: CT abdomen and pelvis with IV contrast - \"1. Heterogeneous enhancing mass posteriorly in the upper " "pole of the right kidney measures 4.5 x 5.8 x 5.7 cm (AP by transverse by craniocaudal). It has a small nodular component extending posterior medially which abuts the right psoas muscle. This nodular extension measures 2.1 x 2.1 cm. Minimal stranding about the mass. No definite thrombus within the right renal vein. This renal mass is compatible with a renal cell carcinoma. A paraaortic lymph node situated immediately posterior to the left renal vein measures 1.1 cm in short axis, suspicious for metastasis. 2. A 2 cm right external iliac lymph node is also suspicious for metastases. 3. Multiple sclerotic osseous lesions suspicious for metastases. These include 0.8 and 0.6 cm sclerotic lesions laterally in the right iliac wing (images 53 and 62 respectively). Ill-defined groundglass density laterally in the right acetabulum measuring 1.7 x 1.2 cm corresponds with the lesion identified on MRI. A 0.4 cm groundglass density in the left acetabulum. Sclerotic lesion in the left femoral neck measures 0.9 x 1.6 cm (image 81). Sclerotic metastases would be more compatible with prostate metastases. 4. A 3 subcentimeter hepatic lesions are indeterminate. Metastases would be a consideration. These can be further characterized with liver MRI.\"  - 1/25/2019: NM bone scan - \"There is focal bony uptake in the left femoral neck, right acetabulum, right of midline at the S1 or L5 level of the spine, within multiple bilateral ribs, in the C7 or T1 level of the spine, and anteriorly within the skull. Findings are suspicious for metastases.\"  - 1/31/19: CT chest with contrast - \" No suspicious nodules in the chest.  Stable appearance of a 5.8 cm right renal mass concerning for renal carcinoma until proven otherwise.  Stable indeterminant subcentimeter hypodensities in the liver.  Multifocal osteoblastic metastasis including 2.5 cm lesion in the right fifth rib posteriorly and a 1.6 cm lesion in the right third rib posteriorly. No lytic " "lesions identified.\"  - 19: CT-guided right sclerotic fifth rib lesion biopsy - \"Metastatic carcinoma, consistent with prostate primary.  Immunohistochemical stains performed show the metastatic carcinoma stains positive for NKX3.1 (prostate marker), negative for STACIE 3 and PAX8, which supports the above diagnosis.\"  - 2/15/19: Started Casodex 50mg every day and consented for the biobanking protocol. 3/18/19 - stopped Casodex.  - 19: Case discussed in tumor board - recommendation for nephectomy for suspected malignant right renal mass.   - 19: Started Lupron 22.5mg every 3 months.  - 19: Plan to start docetaxel 75mg/m2 IV every 3 weeks for upto 6 cycles.    2. Stage III ([id number]), grade 3 of 4, clear-cell RCC of right kidney:  - Incidentally diagnosed as above.   - Underwent curative-intent robotic right radical nephrectomy with Dr. Wesley Cleveland on 3/19/19. No tumor spillage per op report.   - Path showed: \"Histologic Type: Clear cell renal cell carcinoma; Sarcomatoid Features: Not identified; Histologic Grade: Nucleolar grade 3 (WHO/ISUP). Extent Tumor Size: 4.3 cm. Microscopic Tumor Extension: Tumor extension into renal sinus (in vascular structures). Margins: Negative. Tumor Necrosis: Present; focal. Lymph-Vascular Invasion: Not identified.  Pathologic Staging (pTNM) Primary Tumor (pT): pT3a: Tumor extends into renal vein branches/renal sinus. Regional Lymph Nodes (pN): pNX. Number of Lymph Nodes Examined: 0 Distant Metastasis (pM): pM N/A.\"    REVIEW OF SYSTEMS: 14 point ROS negative other than the symptoms noted above in the HPI.    PAST MEDICAL HISTORY:  1. Prostate cancer.  2. Hypothyroidism.  3. Allergic rhinitis.  4. Esophageal reflux disorder.  5. Dyslipidemia.  6. Glucose intolerance.  7. Obesity.     PAST SURGICAL HISTORY:   As above.    SOCIAL HISTORY:   He is  and lives with his wife, Micheline in [place]. He has 4 children and 3 sisters (2 elder brothers  from heart " failure). Denies tobacco, alcohol or illicit drug use. 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    FAMILY HISTORY:   He does have a remarkable family history with several maternal relatives affected with cancer.  He has 5 siblings including one sister with skin cancer but no other malignancies.  2 of his brothers passed away at age 68 each because of smoking alcohol use and illicit drug use related heart failure.  Several maternal aunts with lymphoma, colon, liver, breast, and lung cancer, as well as a maternal uncle with lung cancer. His paternal uncle had stomach cancer.     ALLERGIES:   Allergies   Allergen Reactions     Doxycycline GI Disturbance     CURRENT MEDICATIONS:   Current Outpatient Medications:      amLODIPine (NORVASC) 10 MG tablet, Take 1 tablet (10 mg) by mouth daily, Disp: 30 tablet, Rfl: 0     atorvastatin (LIPITOR) 20 MG tablet, Take 20 mg by mouth daily, Disp: , Rfl:      calcium citrate-vitamin D (CITRACAL) 315-250 MG-UNIT TABS per tablet, Take 650 mg by mouth 2 times daily , Disp: , Rfl:      cetirizine (ZYRTEC) 10 MG tablet, Take 10 mg by mouth as needed, Disp: , Rfl:      cinnamon 500 MG CAPS, Take 500 mg by mouth daily , Disp: , Rfl:      cycloSPORINE (RESTASIS) 0.05 % ophthalmic emulsion, Place 1 drop into both eyes 2 times daily , Disp: , Rfl:      fluticasone (FLONASE) 50 MCG/ACT nasal spray, Spray 2 sprays in nostril daily as needed , Disp: , Rfl:      Glucosamine-Chondroit-Vit C-Mn (GLUCOSAMINE 1500 COMPLEX) CAPS, Take 1 capsule by mouth daily, Disp: , Rfl:      levothyroxine (SYNTHROID/LEVOTHROID) 125 MCG tablet, Take 125 mcg by mouth daily, Disp: , Rfl:      Multiple Vitamins-Minerals (MULTIVITAMIN ADULT EXTRA C PO), Take 1 tablet by mouth every 24 hours, Disp: , Rfl:      Nutritional Supplements (SALMON OIL) CAPS, Take 1 capsule by mouth daily, Disp: , Rfl:      omeprazole (PRILOSEC) 20 MG DR capsule, Take 20 mg by mouth daily, Disp: , Rfl:  "     dexamethasone (DECADRON) 4 MG tablet, Take 8 mg by mouth 2 times daily Evening PRIOR and continue morning of Docetaxel, then 4 additional doses.., Disp: 12 tablet, Rfl: 5     prochlorperazine (COMPAZINE) 10 MG tablet, Take 1 tablet (10 mg) by mouth every 6 hours as needed (Nausea/Vomiting), Disp: 30 tablet, Rfl: 5    PHYSICAL EXAMINATION:  Vital signs: /83 (BP Location: Right arm, Patient Position: Sitting, Cuff Size: Adult Regular)   Pulse 63   Temp 97.9  F (36.6  C) (Oral)   Resp 16   Ht 1.829 m (6' 0.01\")   Wt 111 kg (244 lb 12.8 oz)   SpO2 97%   BMI 33.19 kg/m    ECOG performance status of 1. Fatigue mild.  GENERAL: Well-nourished healthy-appearing man in chair, no acute distress.   HEENT: No icterus, no pallor. Moist mucous membranes. Oropharynx is clear. No mucosal abnormalities.  NECK: Supple, no JVD/LAD.  LUNGS: Clear to ausculation bilaterally, normal work of breathing.   CARDIOVASCULAR: Regular rate and rhythm, no murmurs, gallops or rubs.   ABDOMEN: Soft, nontender and nondistended, no palpable masses, bowel sounds present.  EXTREMITIES: No cyanosis, no clubbing, no edema.   NEUROLOGIC: No focal deficits, CN 2-12 intact.  LYMPH NODE EXAM: No palpable adenopathy - cervical, axillary or inguinal.   SKIN: Rash has resolved.    LABORATORY DATA:   Lab Test 04/30/19  1627 03/26/19  1604 03/20/19  0612  02/26/19  1425   WBC 6.1 7.4 11.1*   < > 6.5   RBC 4.65 4.86 4.77   < > 5.11   HGB 13.4 13.4 13.4   < > 14.1   HCT 39.7* 42.5 42.4   < > 45.3   MCV 85 87 89   < > 89   MCH 28.8 27.6 28.1   < > 27.6   MCHC 33.8 31.5 31.6   < > 31.1*   RDW 13.3 13.3 13.8   < > 13.8    260 191   < > 231   NEUTROPHIL 49.7 58.3  --   --  55.7    140 135   < > 140   POTASSIUM 3.8 3.9 4.3   < > 3.6   CHLORIDE 106 107 105   < > 108   CO2 24 26 25   < > 26   ANIONGAP 8 7 5   < > 6   GLC 96 96 112*   < > 87   BUN 28 23 18   < > 16   CR 1.33* 1.37* 1.31*   < > 0.87   BETHANY 9.2 9.2 9.0   < > 8.5   PROTTOTAL 8.0 " "7.9  --   --  7.6   ALBUMIN 3.9 3.5  --   --  3.8   BILITOTAL 0.2 0.3  --   --  0.3   ALKPHOS 82 116  --   --  98   AST 30 27  --   --  23   ALT 58 54  --   --  26    < > = values in this interval not displayed.     Lab Test 04/30/19  1627 03/26/19  1604 02/26/19  1425 02/15/19  1044   PSA 0.69 1.25 8.00* 12.30*   CGAB  --  900* 654* 475*   TESTOSTTOTAL  --  22* 462 222*   CGAB - Chromogranin A; TESTOSTTOTAL: Total testosterone.    IMAGING STUDIES:  As above.    ASSESSMENT AND PLAN: Mr. Reyes is a delightful 58-year-old gentleman with newly diagnosed metastatic castration sensitive prostate adenocarcinoma as well as a stage III clear-cell renal cell carcinoma of right kidney, who is here for a follow-up visit.     Metastatic prostate cancer:   - Patient meets the criteria for CHAARTED \"high-volume\" metastatic hormone-sensitive prostate cancer.  - I do not have a clinical trial for this gentleman at this time, especially given the suspicion for a concurrent malignancy.  He has enrolled in the institutional biobanking study on 2/15/19 but this is a non-interventional study.   - We again reviewed systemic therapy options for newly-diagnosed metastatic hormone-sensitive prostate cancer including GNRH agonist versus antagonist by itself versus GNRH agonist/antagonist in combination with docetaxel (ARTUROED, [id number], KARLA) or abiraterone (KARLA, BERNA) based on his disease volume and preference in terms of therapeutic options.   - He wants to pursue chemohormonal therapy after understanding the options.   - Continue Lupron 22.5mg every 3 monthly - next dose in end of May 2019.  - Plan to start docetaxel in early/mid May 2019. Regimen will be docetaxel 75mg/m2 IV every 3 weeks for up to 6 cycles. No primary prophy G-CSF.  - He understands the risks and benefits of ADT including hot flashes, mood changes and long-term cardiovascular, bone health, etc. Also understands the risks of docetaxel treatment " including fatigue, nausea, vomiting, diarrhea/constipation, hand-foot syndrome, allergic reactions, myelosuppression with increased risk of infection and bleeding etc.  - Risks/benefits of docetaxel including allergic reaction, fatigue, nausea/vomiting/constipation, myelosuppression, neuropathy, LFT changes etc discussed in detail. Information handout provided.  - Will repeat restaging scans within this week. Will also get a PORT placed before starting chemo.   - Will also add antiresorptive therapy based on restaging results.     Stage 3, UISS-high risk ccRCC:  - Patient understands that he has a stage III, UISS high risk clear-cell renal cell carcinoma with approximately 40-50% risk of recurrence after definitive surgery.   - At this time, he has no clear evidence of residual disease.  The retroperitoneal lymphadenopathy is more consistent with metastatic prostate cancer.  - He understands the role of adjuvant therapies in RCC and that at best, the data for adjuvant TKI therapy is mixed and shows an improvement in disease-free survival and not overall survival.   - Continue active surveillance with self-reporting for signs/symptoms of recurrence (explained in detail) and periodic H&P and scans.     Genetics referral:  - Most striking is the finding of 2 concurrent malignancies in this relatively young gentleman with a very high risk family history of malignancies.  While biologically there is a little overlap between prostate cancer and renal cancer, this can be seen in some inherited syndromes.  If present, such an aberration could have implications for his prostate cancer biology as well as clinical trajectory.  - Has been seen by Genetics clinic on 3/7/19. Defer further workup and management to them.     Research participation:  - Enrolled in  biobanking study.    Fertility preservation:  - Patient, Walter Reyes, meets the criteria for fertility preservation:  - The patient has declined fertility  "preservation--I discussed the possible risk of temporary or irreversible impairment of the fertility with the patient today. He demonstrated a complete understanding of the fertility preservation options and declined fertility preservation. The patient provided the following reason: \"has 4 children, doesn't want any more\" for this choice.    Return to see me before starting chemo.    Patient and family were very appreciative of the care provided and in complete agreement this plan.  All questions were answered.    BILLIN.    Uday Smalls M.D.  . Professor of Medicine  Genitourinary Oncology  Division of Hematology, Oncology & Transplantation  [facility]  "

## 2019-04-30 NOTE — LETTER
"4/30/2019       RE: Walter Reyes  [address] [facility] [id number]     Dear Colleague,    Thank you for referring your patient, Walter Reyes, to the [facility]. Please see a copy of my visit note below.    MEDICAL ONCOLOGY CLINIC NOTE    REFERRING PROVIDER: Jorge Alberto Yepez MD at [facility].    REASON FOR CURRENT VISIT:   1. Evaluation while on ADT for metastatic prostate cancer.  2. Discussion of treatment options for stage 3 RCC.     HISTORY OF PRESENT ILLNESS:  Mr. 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 His oncologic history is detailed below.  His wife, Micheline, accompanies him for this visit.    Walter started ADT in mid Feb 2019 and underwent a robotic right nephrectomy with Dr. Cleveland on 03/19/19. The surgery was uneventful and he has recovered well.     No treatment related side effects from ADT except for hot flashes (grade 1) at this time. No bone pain, hematuria, SOA/cough/CP etc. Chronic obstructive urinary symptoms unchanged.     No signs/symptoms concerning for disease progression.    ONCOLOGIC HISTORY:  1. Prostate adenocarcinoma, stage IV (M1b at diagnosis), high-volume, castration-sensitive:  - 12/13/2018: PSA found to be elevated to 9.1 ng/mL on a routine follow-up with primary care provider Dr. Naylor at [facility]. Prior PSA were 1.4 on 8/16/17, 2.4 on 6/28/16, 2.9 on 6/28/16, and as low as 0.4 on 3/26/2003.   - 1/08/2019: Consultation with Sarah Wilson CNP in Urology clinic. Repeat PSA 9.6.  - 1/16/2019: MRI prostate with contrast - \"This examination is characterized as PIRADS 5- very high probability. Clinically significant cancer is highly likely to be present. There is a large, invasive mass arising from the right peripheral zone and extending into the neurovascular bundle, seminal vesicle, and along the " "anterolateral right mesorectal fascia. Metastatic right external iliac lymph node. Metastatic lesion in the posterior/superior right acetabulum.\"  - 1/25/2019: CT abdomen and pelvis with IV contrast - \"1. Heterogeneous enhancing mass posteriorly in the upper pole of the right kidney measures 4.5 x 5.8 x 5.7 cm (AP by transverse by craniocaudal). It has a small nodular component extending posterior medially which abuts the right psoas muscle. This nodular extension measures 2.1 x 2.1 cm. Minimal stranding about the mass. No definite thrombus within the right renal vein. This renal mass is compatible with a renal cell carcinoma. A paraaortic lymph node situated immediately posterior to the left renal vein measures 1.1 cm in short axis, suspicious for metastasis. 2. A 2 cm right external iliac lymph node is also suspicious for metastases. 3. Multiple sclerotic osseous lesions suspicious for metastases. These include 0.8 and 0.6 cm sclerotic lesions laterally in the right iliac wing (images 53 and 62 respectively). Ill-defined groundglass density laterally in the right acetabulum measuring 1.7 x 1.2 cm corresponds with the lesion identified on MRI. A 0.4 cm groundglass density in the left acetabulum. Sclerotic lesion in the left femoral neck measures 0.9 x 1.6 cm (image 81). Sclerotic metastases would be more compatible with prostate metastases. 4. A 3 subcentimeter hepatic lesions are indeterminate. Metastases would be a consideration. These can be further characterized with liver MRI.\"  - 1/25/2019: NM bone scan - \"There is focal bony uptake in the left femoral neck, right acetabulum, right of midline at the S1 or L5 level of the spine, within multiple bilateral ribs, in the C7 or T1 level of the spine, and anteriorly within the skull. Findings are suspicious for metastases.\"  - 1/31/19: CT chest with contrast - \" No suspicious nodules in the chest.  Stable appearance of a 5.8 cm right renal mass concerning for renal " "carcinoma until proven otherwise.  Stable indeterminant subcentimeter hypodensities in the liver.  Multifocal osteoblastic metastasis including 2.5 cm lesion in the right fifth rib posteriorly and a 1.6 cm lesion in the right third rib posteriorly. No lytic lesions identified.\"  - 2/6/19: CT-guided right sclerotic fifth rib lesion biopsy - \"Metastatic carcinoma, consistent with prostate primary.  Immunohistochemical stains performed show the metastatic carcinoma stains positive for NKX3.1 (prostate marker), negative for STACIE 3 and PAX8, which supports the above diagnosis.\"  - 2/15/19: Started Casodex 50mg every day and consented for the biobanking protocol. 3/18/19 - stopped Casodex.  - 2/19/19: Case discussed in tumor board - recommendation for nephectomy for suspected malignant right renal mass.   - 02/26/19: Started Lupron 22.5mg every 3 months.  - 5/14/19: Plan to start docetaxel 75mg/m2 IV every 3 weeks for upto 6 cycles.    2. Stage III ([id number]), grade 3 of 4, clear-cell RCC of right kidney:  - Incidentally diagnosed as above.   - Underwent curative-intent robotic right radical nephrectomy with Dr. Wesley Cleveland on 3/19/19. No tumor spillage per op report.   - Path showed: \"Histologic Type: Clear cell renal cell carcinoma; Sarcomatoid Features: Not identified; Histologic Grade: Nucleolar grade 3 (WHO/ISUP). Extent Tumor Size: 4.3 cm. Microscopic Tumor Extension: Tumor extension into renal sinus (in vascular structures). Margins: Negative. Tumor Necrosis: Present; focal. Lymph-Vascular Invasion: Not identified.  Pathologic Staging (pTNM) Primary Tumor (pT): pT3a: Tumor extends into renal vein branches/renal sinus. Regional Lymph Nodes (pN): pNX. Number of Lymph Nodes Examined: 0 Distant Metastasis (pM): pM N/A.\"    REVIEW OF SYSTEMS: 14 point ROS negative other than the symptoms noted above in the HPI.    PAST MEDICAL HISTORY:  1. Prostate cancer.  2. Hypothyroidism.  3. Allergic rhinitis.  4. Esophageal " reflux disorder.  5. Dyslipidemia.  6. Glucose intolerance.  7. Obesity.     PAST SURGICAL HISTORY:   As above.    SOCIAL HISTORY:   He is  and lives with his wife, Micheline in [place]. He has 4 children and 3 sisters (2 elder brothers  from heart failure). Denies tobacco, alcohol or illicit drug use. 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    FAMILY HISTORY:   He does have a remarkable family history with several maternal relatives affected with cancer.  He has 5 siblings including one sister with skin cancer but no other malignancies.  2 of his brothers passed away at age 68 each because of smoking alcohol use and illicit drug use related heart failure.  Several maternal aunts with lymphoma, colon, liver, breast, and lung cancer, as well as a maternal uncle with lung cancer. His paternal uncle had stomach cancer.     ALLERGIES:   Allergies   Allergen Reactions     Doxycycline GI Disturbance     CURRENT MEDICATIONS:   Current Outpatient Medications:      amLODIPine (NORVASC) 10 MG tablet, Take 1 tablet (10 mg) by mouth daily, Disp: 30 tablet, Rfl: 0     atorvastatin (LIPITOR) 20 MG tablet, Take 20 mg by mouth daily, Disp: , Rfl:      calcium citrate-vitamin D (CITRACAL) 315-250 MG-UNIT TABS per tablet, Take 650 mg by mouth 2 times daily , Disp: , Rfl:      cetirizine (ZYRTEC) 10 MG tablet, Take 10 mg by mouth as needed, Disp: , Rfl:      cinnamon 500 MG CAPS, Take 500 mg by mouth daily , Disp: , Rfl:      cycloSPORINE (RESTASIS) 0.05 % ophthalmic emulsion, Place 1 drop into both eyes 2 times daily , Disp: , Rfl:      fluticasone (FLONASE) 50 MCG/ACT nasal spray, Spray 2 sprays in nostril daily as needed , Disp: , Rfl:      Glucosamine-Chondroit-Vit C-Mn (GLUCOSAMINE 1500 COMPLEX) CAPS, Take 1 capsule by mouth daily, Disp: , Rfl:      levothyroxine (SYNTHROID/LEVOTHROID) 125 MCG tablet, Take 125 mcg by mouth daily, Disp: , Rfl:      Multiple Vitamins-Minerals  "(MULTIVITAMIN ADULT EXTRA C PO), Take 1 tablet by mouth every 24 hours, Disp: , Rfl:      Nutritional Supplements (SALMON OIL) CAPS, Take 1 capsule by mouth daily, Disp: , Rfl:      omeprazole (PRILOSEC) 20 MG DR capsule, Take 20 mg by mouth daily, Disp: , Rfl:      dexamethasone (DECADRON) 4 MG tablet, Take 8 mg by mouth 2 times daily Evening PRIOR and continue morning of Docetaxel, then 4 additional doses.., Disp: 12 tablet, Rfl: 5     prochlorperazine (COMPAZINE) 10 MG tablet, Take 1 tablet (10 mg) by mouth every 6 hours as needed (Nausea/Vomiting), Disp: 30 tablet, Rfl: 5    PHYSICAL EXAMINATION:  Vital signs: /83 (BP Location: Right arm, Patient Position: Sitting, Cuff Size: Adult Regular)   Pulse 63   Temp 97.9  F (36.6  C) (Oral)   Resp 16   Ht 1.829 m (6' 0.01\")   Wt 111 kg (244 lb 12.8 oz)   SpO2 97%   BMI 33.19 kg/m     ECOG performance status of 1. Fatigue mild.  GENERAL: Well-nourished healthy-appearing man in chair, no acute distress.   HEENT: No icterus, no pallor. Moist mucous membranes. Oropharynx is clear. No mucosal abnormalities.  NECK: Supple, no JVD/LAD.  LUNGS: Clear to ausculation bilaterally, normal work of breathing.   CARDIOVASCULAR: Regular rate and rhythm, no murmurs, gallops or rubs.   ABDOMEN: Soft, nontender and nondistended, no palpable masses, bowel sounds present.  EXTREMITIES: No cyanosis, no clubbing, no edema.   NEUROLOGIC: No focal deficits, CN 2-12 intact.  LYMPH NODE EXAM: No palpable adenopathy - cervical, axillary or inguinal.   SKIN: Rash has resolved.    LABORATORY DATA:   Lab Test 04/30/19  1627 03/26/19  1604 03/20/19  0612  02/26/19  1425   WBC 6.1 7.4 11.1*   < > 6.5   RBC 4.65 4.86 4.77   < > 5.11   HGB 13.4 13.4 13.4   < > 14.1   HCT 39.7* 42.5 42.4   < > 45.3   MCV 85 87 89   < > 89   MCH 28.8 27.6 28.1   < > 27.6   MCHC 33.8 31.5 31.6   < > 31.1*   RDW 13.3 13.3 13.8   < > 13.8    260 191   < > 231   NEUTROPHIL 49.7 58.3  --   --  55.7    " "140 135   < > 140   POTASSIUM 3.8 3.9 4.3   < > 3.6   CHLORIDE 106 107 105   < > 108   CO2 24 26 25   < > 26   ANIONGAP 8 7 5   < > 6   GLC 96 96 112*   < > 87   BUN 28 23 18   < > 16   CR 1.33* 1.37* 1.31*   < > 0.87   BETHANY 9.2 9.2 9.0   < > 8.5   PROTTOTAL 8.0 7.9  --   --  7.6   ALBUMIN 3.9 3.5  --   --  3.8   BILITOTAL 0.2 0.3  --   --  0.3   ALKPHOS 82 116  --   --  98   AST 30 27  --   --  23   ALT 58 54  --   --  26    < > = values in this interval not displayed.     Lab Test 04/30/19  1627 03/26/19  1604 02/26/19  1425 02/15/19  1044   PSA 0.69 1.25 8.00* 12.30*   CGAB  --  900* 654* 475*   TESTOSTTOTAL  --  22* 462 222*   CGAB - Chromogranin A; TESTOSTTOTAL: Total testosterone.    IMAGING STUDIES:  As above.    ASSESSMENT AND PLAN: Mr. Reyes is a delightful 58-year-old gentleman with newly diagnosed metastatic castration sensitive prostate adenocarcinoma as well as a stage III clear-cell renal cell carcinoma of right kidney, who is here for a follow-up visit.     Metastatic prostate cancer:   - Patient meets the criteria for CHAARTED \"high-volume\" metastatic hormone-sensitive prostate cancer.  - I do not have a clinical trial for this gentleman at this time, especially given the suspicion for a concurrent malignancy.  He has enrolled in the institutional biobanking study on 2/15/19 but this is a non-interventional study.   - We again reviewed systemic therapy options for newly-diagnosed metastatic hormone-sensitive prostate cancer including GNRH agonist versus antagonist by itself versus GNRH agonist/antagonist in combination with docetaxel (CHAARTED, GETUG-AFU15, KARLA) or abiraterone (KARLA, LATMITCHELL) based on his disease volume and preference in terms of therapeutic options.   - He wants to pursue chemohormonal therapy after understanding the options.   - Continue Lupron 22.5mg every 3 monthly - next dose in end of May 2019.  - Plan to start docetaxel in early/mid May 2019. Regimen will be " docetaxel 75mg/m2 IV every 3 weeks for up to 6 cycles. No primary prophy G-CSF.  - He understands the risks and benefits of ADT including hot flashes, mood changes and long-term cardiovascular, bone health, etc. Also understands the risks of docetaxel treatment including fatigue, nausea, vomiting, diarrhea/constipation, hand-foot syndrome, allergic reactions, myelosuppression with increased risk of infection and bleeding etc.  - Risks/benefits of docetaxel including allergic reaction, fatigue, nausea/vomiting/constipation, myelosuppression, neuropathy, LFT changes etc discussed in detail. Information handout provided.  - Will repeat restaging scans within this week. Will also get a PORT placed before starting chemo.   - Will also add antiresorptive therapy based on restaging results.     Stage 3, UISS-high risk ccRCC:  - Patient understands that he has a stage III, UISS high risk clear-cell renal cell carcinoma with approximately 40-50% risk of recurrence after definitive surgery.   - At this time, he has no clear evidence of residual disease.  The retroperitoneal lymphadenopathy is more consistent with metastatic prostate cancer.  - He understands the role of adjuvant therapies in RCC and that at best, the data for adjuvant TKI therapy is mixed and shows an improvement in disease-free survival and not overall survival.   - Continue active surveillance with self-reporting for signs/symptoms of recurrence (explained in detail) and periodic H&P and scans.     Genetics referral:  - Most striking is the finding of 2 concurrent malignancies in this relatively young gentleman with a very high risk family history of malignancies.  While biologically there is a little overlap between prostate cancer and renal cancer, this can be seen in some inherited syndromes.  If present, such an aberration could have implications for his prostate cancer biology as well as clinical trajectory.  - Has been seen by Genetics clinic on  3/7/19. Defer further workup and management to them.     Research participation:  - Enrolled in  biobanking study.    Return to see me before starting chemo.    Patient and family were very appreciative of the care provided and in complete agreement this plan.  All questions were answered.    BILLIN.    Uday Smalls M.D.  . Professor of Medicine  Genitourinary Oncology  Division of Hematology, Oncology & Transplantation  [facility]

## 2019-04-30 NOTE — NURSING NOTE
Chief Complaint   Patient presents with     Blood Draw     Labs drawn via  by RN in lab. VS taken.      Labs drawn via venipuncture. Vital signs taken. Checked into next appointment.     Veda Leon RN

## 2019-05-01 ENCOUNTER — ANESTHESIA EVENT (OUTPATIENT)
Dept: SURGERY | Facility: AMBULATORY SURGERY CENTER | Age: 59
End: 2019-05-01

## 2019-05-02 ENCOUNTER — ANCILLARY PROCEDURE (OUTPATIENT)
Dept: RADIOLOGY | Facility: AMBULATORY SURGERY CENTER | Age: 59
End: 2019-05-02
Attending: INTERNAL MEDICINE
Payer: COMMERCIAL

## 2019-05-02 ENCOUNTER — ANESTHESIA (OUTPATIENT)
Dept: SURGERY | Facility: AMBULATORY SURGERY CENTER | Age: 59
End: 2019-05-02

## 2019-05-02 ENCOUNTER — HOSPITAL ENCOUNTER (OUTPATIENT)
Facility: AMBULATORY SURGERY CENTER | Age: 59
End: 2019-05-02
Attending: PHYSICIAN ASSISTANT
Payer: COMMERCIAL

## 2019-05-02 VITALS
HEART RATE: 50 BPM | DIASTOLIC BLOOD PRESSURE: 86 MMHG | WEIGHT: 240 LBS | SYSTOLIC BLOOD PRESSURE: 135 MMHG | RESPIRATION RATE: 16 BRPM | OXYGEN SATURATION: 100 % | TEMPERATURE: 97.1 F | HEIGHT: 72 IN | BODY MASS INDEX: 32.51 KG/M2

## 2019-05-02 DIAGNOSIS — C61 MALIGNANT NEOPLASM OF PROSTATE METASTATIC TO BONE (H): ICD-10-CM

## 2019-05-02 DIAGNOSIS — C79.51 MALIGNANT NEOPLASM OF PROSTATE METASTATIC TO BONE (H): ICD-10-CM

## 2019-05-02 LAB
INR PPP: 0.95 (ref 0.86–1.14)
TESTOST SERPL-MCNC: 7 NG/DL (ref 240–950)

## 2019-05-02 DEVICE — CATH PORT POWERPORT CLEARVUE ISP 8FR 5608062: Type: IMPLANTABLE DEVICE | Site: CHEST  WALL | Status: FUNCTIONAL

## 2019-05-02 RX ORDER — ONDANSETRON 2 MG/ML
INJECTION INTRAMUSCULAR; INTRAVENOUS PRN
Status: DISCONTINUED | OUTPATIENT
Start: 2019-05-02 | End: 2019-05-02

## 2019-05-02 RX ORDER — ONDANSETRON 2 MG/ML
4 INJECTION INTRAMUSCULAR; INTRAVENOUS EVERY 30 MIN PRN
Status: DISCONTINUED | OUTPATIENT
Start: 2019-05-02 | End: 2019-05-03 | Stop reason: HOSPADM

## 2019-05-02 RX ORDER — OXYCODONE HYDROCHLORIDE 5 MG/1
5 TABLET ORAL EVERY 4 HOURS PRN
Status: DISCONTINUED | OUTPATIENT
Start: 2019-05-02 | End: 2019-05-03 | Stop reason: HOSPADM

## 2019-05-02 RX ORDER — CEFAZOLIN SODIUM 2 G/50ML
2 SOLUTION INTRAVENOUS
Status: COMPLETED | OUTPATIENT
Start: 2019-05-02 | End: 2019-05-02

## 2019-05-02 RX ORDER — HEPARIN SODIUM,PORCINE 10 UNIT/ML
5 VIAL (ML) INTRAVENOUS EVERY 24 HOURS
Status: CANCELLED | OUTPATIENT
Start: 2019-05-02

## 2019-05-02 RX ORDER — FENTANYL CITRATE 50 UG/ML
25-50 INJECTION, SOLUTION INTRAMUSCULAR; INTRAVENOUS
Status: DISCONTINUED | OUTPATIENT
Start: 2019-05-02 | End: 2019-05-03 | Stop reason: HOSPADM

## 2019-05-02 RX ORDER — ONDANSETRON 4 MG/1
4 TABLET, ORALLY DISINTEGRATING ORAL EVERY 30 MIN PRN
Status: DISCONTINUED | OUTPATIENT
Start: 2019-05-02 | End: 2019-05-03 | Stop reason: HOSPADM

## 2019-05-02 RX ORDER — LIDOCAINE 40 MG/G
CREAM TOPICAL
Status: DISCONTINUED | OUTPATIENT
Start: 2019-05-02 | End: 2019-05-03 | Stop reason: HOSPADM

## 2019-05-02 RX ORDER — ACETAMINOPHEN 325 MG/1
975 TABLET ORAL ONCE
Status: COMPLETED | OUTPATIENT
Start: 2019-05-02 | End: 2019-05-02

## 2019-05-02 RX ORDER — SODIUM CHLORIDE, SODIUM LACTATE, POTASSIUM CHLORIDE, CALCIUM CHLORIDE 600; 310; 30; 20 MG/100ML; MG/100ML; MG/100ML; MG/100ML
INJECTION, SOLUTION INTRAVENOUS CONTINUOUS
Status: DISCONTINUED | OUTPATIENT
Start: 2019-05-02 | End: 2019-05-03 | Stop reason: HOSPADM

## 2019-05-02 RX ORDER — HEPARIN SODIUM (PORCINE) LOCK FLUSH IV SOLN 100 UNIT/ML 100 UNIT/ML
5 SOLUTION INTRAVENOUS
Status: CANCELLED | OUTPATIENT
Start: 2019-05-02

## 2019-05-02 RX ORDER — MEPERIDINE HYDROCHLORIDE 25 MG/ML
12.5 INJECTION INTRAMUSCULAR; INTRAVENOUS; SUBCUTANEOUS
Status: DISCONTINUED | OUTPATIENT
Start: 2019-05-02 | End: 2019-05-03 | Stop reason: HOSPADM

## 2019-05-02 RX ORDER — PROPOFOL 10 MG/ML
INJECTION, EMULSION INTRAVENOUS CONTINUOUS PRN
Status: DISCONTINUED | OUTPATIENT
Start: 2019-05-02 | End: 2019-05-02

## 2019-05-02 RX ORDER — PROPOFOL 10 MG/ML
INJECTION, EMULSION INTRAVENOUS PRN
Status: DISCONTINUED | OUTPATIENT
Start: 2019-05-02 | End: 2019-05-02

## 2019-05-02 RX ORDER — LIDOCAINE HYDROCHLORIDE 20 MG/ML
INJECTION, SOLUTION INFILTRATION; PERINEURAL PRN
Status: DISCONTINUED | OUTPATIENT
Start: 2019-05-02 | End: 2019-05-02

## 2019-05-02 RX ORDER — NALOXONE HYDROCHLORIDE 0.4 MG/ML
.1-.4 INJECTION, SOLUTION INTRAMUSCULAR; INTRAVENOUS; SUBCUTANEOUS
Status: DISCONTINUED | OUTPATIENT
Start: 2019-05-02 | End: 2019-05-03 | Stop reason: HOSPADM

## 2019-05-02 RX ADMIN — CEFAZOLIN SODIUM 2 G: 2 SOLUTION INTRAVENOUS at 10:59

## 2019-05-02 RX ADMIN — ACETAMINOPHEN 975 MG: 325 TABLET ORAL at 10:36

## 2019-05-02 RX ADMIN — PROPOFOL 150 MCG/KG/MIN: 10 INJECTION, EMULSION INTRAVENOUS at 10:56

## 2019-05-02 RX ADMIN — PROPOFOL 10 MG: 10 INJECTION, EMULSION INTRAVENOUS at 10:56

## 2019-05-02 RX ADMIN — ONDANSETRON 4 MG: 2 INJECTION INTRAMUSCULAR; INTRAVENOUS at 10:59

## 2019-05-02 RX ADMIN — LIDOCAINE HYDROCHLORIDE 100 MG: 20 INJECTION, SOLUTION INFILTRATION; PERINEURAL at 10:56

## 2019-05-02 RX ADMIN — SODIUM CHLORIDE, SODIUM LACTATE, POTASSIUM CHLORIDE, CALCIUM CHLORIDE: 600; 310; 30; 20 INJECTION, SOLUTION INTRAVENOUS at 10:41

## 2019-05-02 ASSESSMENT — MIFFLIN-ST. JEOR: SCORE: 1951.63

## 2019-05-02 NOTE — ANESTHESIA CARE TRANSFER NOTE
Patient: Walter Reyes    Procedure(s):  Chest Port Placement    Diagnosis: Malignant Neoplasm of Prostate Metastatic to Bone  Diagnosis Additional Information: No value filed.    Anesthesia Type:   No value filed.     Note:  Airway :Room Air  Patient transferred to:Phase II  Comments: Freddy Report: Identifed the Patient, Identified the Reponsible Provider, Reviewed the pertinent medical history, Discussed the surgical course, Reviewed Intra-OP anesthesia mangement and issues during anesthesia, Set expectations for post-procedure period and Allowed opportunity for questions and acknowledgement of understanding      Vitals: (Last set prior to Anesthesia Care Transfer)    CRNA VITALS  5/2/2019 1103 - 5/2/2019 1138      5/2/2019             Pulse:  57    SpO2:  98 %                Electronically Signed By: LATESHA Burns CRNA  May 2, 2019  11:38 AM

## 2019-05-02 NOTE — ANESTHESIA PREPROCEDURE EVALUATION
Anesthesia Pre-Procedure Evaluation    Patient: Walter Reyes   MRN:     [id number] Gender:   male   Age:    59 year old :      1960        Preoperative Diagnosis: Malignant Neoplasm of Prostate Metastatic to Bone   Procedure(s):  Chest Port Placement     Past Medical History:   Diagnosis Date     Cancer of kidney, right (H)      Complication of anesthesia     slow wakeup      Malignant neoplasm of prostate metastatic to bone (H) 2019     Thyroid disease       Past Surgical History:   Procedure Laterality Date     CYSTOSCOPY      about 10 years ago     LAPAROSCOPIC NEPHRECTOMY Right 3/19/2019    Procedure: Right laparoscopic radical nephrectomy;  Surgeon: Wesley Cleveland MD;  Location: [facility] OR          Anesthesia Evaluation     . Pt has had prior anesthetic.            ROS/MED HX    ENT/Pulmonary:  - neg pulmonary ROS     Neurologic:  - neg neurologic ROS     Cardiovascular:  - neg cardiovascular ROS       METS/Exercise Tolerance:  >4 METS   Hematologic:  - neg hematologic  ROS       Musculoskeletal:  - neg musculoskeletal ROS       GI/Hepatic:     (+) GERD Asymptomatic on medication,       Renal/Genitourinary:     (+) chronic renal disease, Pt has no history of transplant,       Endo:     (+) thyroid problem Obesity, .      Psychiatric:  - neg psychiatric ROS       Infectious Disease:         Malignancy:         Other:                         PHYSICAL EXAM:   Mental Status/Neuro: A/A/O   Airway: Facies: Feasible  Mallampati: I  Mouth/Opening: Full  TM distance: > 6 cm  Neck ROM: Full   Respiratory: Auscultation: CTAB     Resp. Rate: Normal     Resp. Effort: Normal      CV: Rhythm: Regular  Rate: Age appropriate  Heart: Normal Sounds   Comments:      Dental: Normal                  Lab Results   Component Value Date    WBC 6.1 2019    HGB 13.4 2019    HCT 39.7 (L) 2019     2019     2019    POTASSIUM 3.8 2019    CHLORIDE 106  04/30/2019    CO2 24 04/30/2019    BUN 28 04/30/2019    CR 1.33 (H) 04/30/2019    GLC 96 04/30/2019    BETHANY 9.2 04/30/2019    ALBUMIN 3.9 04/30/2019    PROTTOTAL 8.0 04/30/2019    ALT 58 04/30/2019    AST 30 04/30/2019    ALKPHOS 82 04/30/2019    BILITOTAL 0.2 04/30/2019       Preop Vitals  BP Readings from Last 3 Encounters:   05/02/19 119/83   04/30/19 141/83   04/01/19 152/80    Pulse Readings from Last 3 Encounters:   05/02/19 60   04/30/19 63   03/26/19 50      Resp Readings from Last 3 Encounters:   05/02/19 16   04/30/19 16   03/26/19 18    SpO2 Readings from Last 3 Encounters:   05/02/19 97%   04/30/19 97%   03/26/19 97%      Temp Readings from Last 1 Encounters:   05/02/19 36.8  C (98.2  F) (Oral)    Ht Readings from Last 1 Encounters:   05/02/19 1.829 m (6')      Wt Readings from Last 1 Encounters:   05/02/19 108.9 kg (240 lb)    Estimated body mass index is 32.55 kg/m  as calculated from the following:    Height as of this encounter: 1.829 m (6').    Weight as of this encounter: 108.9 kg (240 lb).     LDA:            Assessment:   ASA SCORE: 3       Documentation: H&P complete; Preop Testing complete; Consents complete   Proceeding: Proceed without further delay  Tobacco Use:  NO Active use of Tobacco/UNKNOWN Tobacco use status     Plan:   Anes. Type:  MAC      Induction:  IV (Standard)   Airway: Native Airway   Access/Monitoring: PIV   Maintenance: Propofol; IV   Emergence: Recovery Site (PACU/ICU)   Logistics: Same Day Surgery     Postop Pain/Sedation Strategy:  Standard-Options: Opioids PRN     PONV Management:  Adult Risk Factors:, Non-Smoker, Postop Opioids  Prevention: Propofol Infusion; Ondansetron     CONSENT: Direct conversation   Plan and risks discussed with: Patient   Blood Products: Consent Deferred (Minimal Blood Loss)                         Horace Michelle MD

## 2019-05-02 NOTE — PROCEDURES
Interventional Radiology Brief Post Procedure Note    Procedure:  IR CHEST PORT PLACEMENT    Proceduralist: Daren Burciaga PA-C    Assistant: None    Time Out: Prior to the start of the procedure and with procedural staff participation, I verbally confirmed the patient s identity using two indicators, relevant allergies, that the procedure was appropriate and matched the consent or emergent situation, and that the correct equipment/implants were available. Immediately prior to starting the procedure I conducted the Time Out with the procedural staff and re-confirmed the patient s name, procedure, and site/side. (The Joint Commission universal protocol was followed.)  Yes    Sedation: Monitored Anesthesia Care (MAC) administered and documented by Anesthesia Care Provider    Findings: Completed image-guided placement of 8 Polish 22 cm single lumen power-injectable central venous port via right IJ. Aspirates and flushes freely, heparin locked and is ready for immediate use.    Estimated Blood Loss: Minimal    Fluoroscopy Time: 0.2 minute(s)    Specimens: None    Complications: 1. None     Condition: Stable    Plan: Ready for immediate use. Follow-up per primary team. Return for removal as indicated.    Comments: See dictated procedure note for full details.    Daren Burciaga PA-C  Interventional Radiology  [phone]

## 2019-05-02 NOTE — ANESTHESIA POSTPROCEDURE EVALUATION
Anesthesia POST Procedure Evaluation    Patient: Walter Reyes   MRN:     [id number] Gender:   male   Age:    59 year old :      1960        Preoperative Diagnosis: Malignant Neoplasm of Prostate Metastatic to Bone   Procedure(s):  Chest Port Placement   Postop Comments: No value filed.       Anesthesia Type:  MAC  No value filed.    Reportable Event: NO     PAIN: Uncomplicated   Sign Out status: Comfortable, Well controlled pain     PONV: No PONV   Sign Out status:  No Nausea or Vomiting     Neuro/Psych: Uneventful perioperative course   Sign Out Status: Preoperative baseline; Age appropriate mentation     Airway/Resp.: Uneventful perioperative course   Sign Out Status: Non labored breathing, age appropriate RR; Resp. Status within EXPECTED Parameters     CV: Uneventful perioperative course   Sign Out status: Appropriate BP and perfusion indices; Appropriate HR/Rhythm     Disposition:   Sign Out in:  Phase II  Disposition:  Home  Recovery Course: Uneventful  Follow-Up: Not required           Last Anesthesia Record Vitals:  CRNA VITALS  2019 1103 - 2019 1203      2019             Pulse:  57    SpO2:  98 %          Last PACU Vitals:  Vitals Value Taken Time   BP     Temp     Pulse     Resp     SpO2     Temp src     NIBP 103/73 2019 11:31 AM   Pulse 57 2019 11:33 AM   SpO2 98 % 2019 11:33 AM   Resp     Temp     Ht Rate 53 2019 11:28 AM   Temp 2           Electronically Signed By: Horace Michelle MD, May 2, 2019, 12:51 PM

## 2019-05-02 NOTE — DISCHARGE INSTRUCTIONS
A collaboration between [facility] and [facility]  Experts in minimally invasive, targeted treatments performed using imaging guidance    Venous Access Device,  Port Catheter or Tunneled or Non-Tunneled Central Line Placement    Today you had a procedure today to install a venous access device; either a tunneled central vein catheter or a subcutaneous port catheter.    One of our Radiology PAs performed this procedure for you today:  ? Daren Burciaga PA-C    After you go home:  - Drink plenty of fluids.  Generally 6-8 (8 ounce) glasses a day is recommended.  - Resume your regular diet unless otherwise ordered by a medical provider.  - Keep any applied tape/gauze dressings clean and dry.  Change tape/gauze dressings if they get wet or soiled.  - You may shower the following day after procedure, however cover and protect from moisture any tape/gauze dressings.  You may let water hit and run over dried skin glue, but do not scrub.  Pat the area dry after showering.  - Port placement incisions are closed with absorbable suture, meaning they do not need to be removed at a later date, and a topical skin adhesive (skin glue).  This glue will wear off in 7-14 days.  Do not remove before this time.  If 14 days have passed and residual glue is present, you may gently remove it.  - Do not apply gels, lotions, or ointments to the glue site for the first 10 days as this may cause the glue to prematurely soften and fail.  - Do not perform strenuous activities or lift greater than 10 pounds for the next three days.  - If there is bleeding or oozing from the procedure site, apply firm pressure to the area for 5-10 minutes.  If the bleeding continues seek medical advice at the numbers below.  - Mild procedure site discomfort can be treated with an ice pack and over-the-counter pain relievers.        For 24 hours after any sedation used:  - Relax and take it easy.  No strenuous  activities.  - Do not drive or operate machines at home or at work.  - No alcohol consumption.  - Do not make any important or legal decisions.    Call our Interventional Radiology (IR) service if:  - If you start bleeding from the procedure site.  If you do start to bleed from the site, lie down and hold some pressure on the site.  Our radiology provider can help you decide if you need to return to the hospital.  - If you have new or worsening pain related to the procedure.  - If you have concerning swelling at the procedure site.  - If you develop persistent nausea or vomiting.  - If you develop hives or a rash or any unexplained itching.  - If you have a fever of greater than 100.5  F and chills in the first 5 days after procedure.  - Any other concerns related to your procedure.      [facility]  Interventional Radiology (IR)  [address] [place] [address]    Contact Number:  [phone]  (IR control desk)  - Monday - Friday 8:00 am - 4:30 pm    After hours for urgent concerns:  [phone]  - After 4:30 pm Monday - Friday, Weekends and Holidays.   - Ask for Interventional Radiology on-call.  Someone is available 24 hours a day.  - [facility] toll free number:  [phone]

## 2019-05-03 ENCOUNTER — HOSPITAL ENCOUNTER (OUTPATIENT)
Dept: NUCLEAR MEDICINE | Facility: CLINIC | Age: 59
Setting detail: NUCLEAR MEDICINE
Discharge: HOME | End: 2019-05-03
Attending: INTERNAL MEDICINE | Admitting: INTERNAL MEDICINE
Payer: COMMERCIAL

## 2019-05-03 ENCOUNTER — HOSPITAL ENCOUNTER (OUTPATIENT)
Dept: CT IMAGING | Facility: CLINIC | Age: 59
Discharge: HOME | End: 2019-05-03
Attending: INTERNAL MEDICINE | Admitting: INTERNAL MEDICINE
Payer: COMMERCIAL

## 2019-05-03 ENCOUNTER — HOSPITAL ENCOUNTER (OUTPATIENT)
Dept: NUCLEAR MEDICINE | Facility: CLINIC | Age: 59
Setting detail: NUCLEAR MEDICINE
End: 2019-05-03
Attending: INTERNAL MEDICINE
Payer: COMMERCIAL

## 2019-05-03 DIAGNOSIS — C79.51 MALIGNANT NEOPLASM OF PROSTATE METASTATIC TO BONE (H): ICD-10-CM

## 2019-05-03 DIAGNOSIS — C61 MALIGNANT NEOPLASM OF PROSTATE METASTATIC TO BONE (H): ICD-10-CM

## 2019-05-03 LAB — CGA SERPL-MCNC: 1309 NG/ML (ref 0–95)

## 2019-05-03 PROCEDURE — 34300033 ZZH RX 343: Performed by: INTERNAL MEDICINE

## 2019-05-03 PROCEDURE — 74177 CT ABD & PELVIS W/CONTRAST: CPT

## 2019-05-03 PROCEDURE — A9503 TC99M MEDRONATE: HCPCS | Performed by: INTERNAL MEDICINE

## 2019-05-03 PROCEDURE — 78306 BONE IMAGING WHOLE BODY: CPT

## 2019-05-03 PROCEDURE — 71260 CT THORAX DX C+: CPT

## 2019-05-03 PROCEDURE — 25000128 H RX IP 250 OP 636: Performed by: STUDENT IN AN ORGANIZED HEALTH CARE EDUCATION/TRAINING PROGRAM

## 2019-05-03 RX ORDER — TC 99M MEDRONATE 20 MG/10ML
20-30 INJECTION, POWDER, LYOPHILIZED, FOR SOLUTION INTRAVENOUS ONCE
Status: COMPLETED | OUTPATIENT
Start: 2019-05-03 | End: 2019-05-03

## 2019-05-03 RX ORDER — IOPAMIDOL 755 MG/ML
135 INJECTION, SOLUTION INTRAVASCULAR ONCE
Status: COMPLETED | OUTPATIENT
Start: 2019-05-03 | End: 2019-05-03

## 2019-05-03 RX ADMIN — IOPAMIDOL 135 ML: 755 INJECTION, SOLUTION INTRAVENOUS at 07:24

## 2019-05-03 RX ADMIN — TC 99M MEDRONATE 26.6 MCI.: 20 INJECTION, POWDER, LYOPHILIZED, FOR SOLUTION INTRAVENOUS at 07:00

## 2019-05-08 ENCOUNTER — ONCOLOGY VISIT (OUTPATIENT)
Dept: ONCOLOGY | Facility: CLINIC | Age: 59
End: 2019-05-08
Attending: INTERNAL MEDICINE
Payer: COMMERCIAL

## 2019-05-08 ENCOUNTER — APPOINTMENT (OUTPATIENT)
Dept: LAB | Facility: CLINIC | Age: 59
End: 2019-05-08
Attending: INTERNAL MEDICINE
Payer: COMMERCIAL

## 2019-05-08 VITALS — OXYGEN SATURATION: 96 % | DIASTOLIC BLOOD PRESSURE: 84 MMHG | HEART RATE: 87 BPM | SYSTOLIC BLOOD PRESSURE: 153 MMHG

## 2019-05-08 VITALS
OXYGEN SATURATION: 97 % | HEIGHT: 72 IN | DIASTOLIC BLOOD PRESSURE: 91 MMHG | BODY MASS INDEX: 33.4 KG/M2 | WEIGHT: 246.6 LBS | HEART RATE: 92 BPM | SYSTOLIC BLOOD PRESSURE: 152 MMHG | TEMPERATURE: 98 F

## 2019-05-08 DIAGNOSIS — C61 MALIGNANT NEOPLASM OF PROSTATE METASTATIC TO BONE (H): Primary | ICD-10-CM

## 2019-05-08 DIAGNOSIS — C61 MALIGNANT NEOPLASM OF PROSTATE METASTATIC TO BONE (H): ICD-10-CM

## 2019-05-08 DIAGNOSIS — C79.51 MALIGNANT NEOPLASM OF PROSTATE METASTATIC TO BONE (H): Primary | ICD-10-CM

## 2019-05-08 DIAGNOSIS — C79.51 MALIGNANT NEOPLASM OF PROSTATE METASTATIC TO BONE (H): ICD-10-CM

## 2019-05-08 LAB
ALBUMIN SERPL-MCNC: 3.9 G/DL (ref 3.4–5)
ALP SERPL-CCNC: 71 U/L (ref 40–150)
ALT SERPL W P-5'-P-CCNC: 50 U/L (ref 0–70)
ANION GAP SERPL CALCULATED.3IONS-SCNC: 11 MMOL/L (ref 3–14)
AST SERPL W P-5'-P-CCNC: 24 U/L (ref 0–45)
BASOPHILS # BLD AUTO: 0 10E9/L (ref 0–0.2)
BASOPHILS NFR BLD AUTO: 0.1 %
BILIRUB DIRECT SERPL-MCNC: <0.1 MG/DL (ref 0–0.2)
BILIRUB SERPL-MCNC: 0.4 MG/DL (ref 0.2–1.3)
BUN SERPL-MCNC: 28 MG/DL (ref 7–30)
CALCIUM SERPL-MCNC: 9.2 MG/DL (ref 8.5–10.1)
CHLORIDE SERPL-SCNC: 107 MMOL/L (ref 94–109)
CO2 SERPL-SCNC: 20 MMOL/L (ref 20–32)
CREAT SERPL-MCNC: 1.16 MG/DL (ref 0.66–1.25)
DIFFERENTIAL METHOD BLD: ABNORMAL
EOSINOPHIL # BLD AUTO: 0 10E9/L (ref 0–0.7)
EOSINOPHIL NFR BLD AUTO: 0 %
ERYTHROCYTE [DISTWIDTH] IN BLOOD BY AUTOMATED COUNT: 13.4 % (ref 10–15)
GFR SERPL CREATININE-BSD FRML MDRD: 69 ML/MIN/{1.73_M2}
GLUCOSE SERPL-MCNC: 135 MG/DL (ref 70–99)
HCT VFR BLD AUTO: 35.7 % (ref 40–53)
HGB BLD-MCNC: 12.5 G/DL (ref 13.3–17.7)
IMM GRANULOCYTES # BLD: 0.1 10E9/L (ref 0–0.4)
IMM GRANULOCYTES NFR BLD: 0.5 %
LYMPHOCYTES # BLD AUTO: 1.1 10E9/L (ref 0.8–5.3)
LYMPHOCYTES NFR BLD AUTO: 7.4 %
MCH RBC QN AUTO: 29.3 PG (ref 26.5–33)
MCHC RBC AUTO-ENTMCNC: 35 G/DL (ref 31.5–36.5)
MCV RBC AUTO: 84 FL (ref 78–100)
MONOCYTES # BLD AUTO: 0.3 10E9/L (ref 0–1.3)
MONOCYTES NFR BLD AUTO: 1.8 %
NEUTROPHILS # BLD AUTO: 13.9 10E9/L (ref 1.6–8.3)
NEUTROPHILS NFR BLD AUTO: 90.2 %
NRBC # BLD AUTO: 0 10*3/UL
NRBC BLD AUTO-RTO: 0 /100
PLATELET # BLD AUTO: 274 10E9/L (ref 150–450)
POTASSIUM SERPL-SCNC: 4 MMOL/L (ref 3.4–5.3)
PROT SERPL-MCNC: 7.7 G/DL (ref 6.8–8.8)
RBC # BLD AUTO: 4.27 10E12/L (ref 4.4–5.9)
SODIUM SERPL-SCNC: 138 MMOL/L (ref 133–144)
WBC # BLD AUTO: 15.3 10E9/L (ref 4–11)

## 2019-05-08 PROCEDURE — 82248 BILIRUBIN DIRECT: CPT | Performed by: INTERNAL MEDICINE

## 2019-05-08 PROCEDURE — 85025 COMPLETE CBC W/AUTO DIFF WBC: CPT | Performed by: INTERNAL MEDICINE

## 2019-05-08 PROCEDURE — 25000128 H RX IP 250 OP 636: Mod: ZF | Performed by: INTERNAL MEDICINE

## 2019-05-08 PROCEDURE — 96375 TX/PRO/DX INJ NEW DRUG ADDON: CPT

## 2019-05-08 PROCEDURE — G0463 HOSPITAL OUTPT CLINIC VISIT: HCPCS | Mod: ZF

## 2019-05-08 PROCEDURE — 25800030 ZZH RX IP 258 OP 636: Mod: ZF | Performed by: INTERNAL MEDICINE

## 2019-05-08 PROCEDURE — 96413 CHEMO IV INFUSION 1 HR: CPT

## 2019-05-08 PROCEDURE — 80053 COMPREHEN METABOLIC PANEL: CPT | Performed by: INTERNAL MEDICINE

## 2019-05-08 PROCEDURE — 96415 CHEMO IV INFUSION ADDL HR: CPT

## 2019-05-08 PROCEDURE — 99214 OFFICE O/P EST MOD 30 MIN: CPT | Performed by: INTERNAL MEDICINE

## 2019-05-08 PROCEDURE — G0463 HOSPITAL OUTPT CLINIC VISIT: HCPCS | Mod: 25,27

## 2019-05-08 RX ORDER — LORAZEPAM 2 MG/ML
0.5 INJECTION INTRAMUSCULAR EVERY 4 HOURS PRN
Status: CANCELLED
Start: 2019-05-08

## 2019-05-08 RX ORDER — ALBUTEROL SULFATE 0.83 MG/ML
2.5 SOLUTION RESPIRATORY (INHALATION)
Status: CANCELLED | OUTPATIENT
Start: 2019-05-08

## 2019-05-08 RX ORDER — METHYLPREDNISOLONE SODIUM SUCCINATE 125 MG/2ML
125 INJECTION, POWDER, LYOPHILIZED, FOR SOLUTION INTRAMUSCULAR; INTRAVENOUS
Status: CANCELLED
Start: 2019-05-08

## 2019-05-08 RX ORDER — ALBUTEROL SULFATE 90 UG/1
1-2 AEROSOL, METERED RESPIRATORY (INHALATION)
Status: CANCELLED
Start: 2019-05-08

## 2019-05-08 RX ORDER — METHYLPREDNISOLONE SODIUM SUCCINATE 125 MG/2ML
125 INJECTION, POWDER, LYOPHILIZED, FOR SOLUTION INTRAMUSCULAR; INTRAVENOUS
Status: DISCONTINUED | OUTPATIENT
Start: 2019-05-08 | End: 2019-05-08 | Stop reason: HOSPADM

## 2019-05-08 RX ORDER — HEPARIN SODIUM (PORCINE) LOCK FLUSH IV SOLN 100 UNIT/ML 100 UNIT/ML
5 SOLUTION INTRAVENOUS ONCE
Status: COMPLETED | OUTPATIENT
Start: 2019-05-08 | End: 2019-05-08

## 2019-05-08 RX ORDER — SODIUM CHLORIDE 9 MG/ML
1000 INJECTION, SOLUTION INTRAVENOUS CONTINUOUS PRN
Status: CANCELLED
Start: 2019-05-08

## 2019-05-08 RX ORDER — MEPERIDINE HYDROCHLORIDE 25 MG/ML
25 INJECTION INTRAMUSCULAR; INTRAVENOUS; SUBCUTANEOUS EVERY 30 MIN PRN
Status: CANCELLED | OUTPATIENT
Start: 2019-05-08

## 2019-05-08 RX ORDER — EPINEPHRINE 1 MG/ML
0.3 INJECTION, SOLUTION INTRAMUSCULAR; SUBCUTANEOUS EVERY 5 MIN PRN
Status: DISCONTINUED | OUTPATIENT
Start: 2019-05-08 | End: 2019-05-08 | Stop reason: HOSPADM

## 2019-05-08 RX ORDER — EPINEPHRINE 0.3 MG/.3ML
0.3 INJECTION SUBCUTANEOUS EVERY 5 MIN PRN
Status: CANCELLED | OUTPATIENT
Start: 2019-05-08

## 2019-05-08 RX ORDER — EPINEPHRINE 1 MG/ML
0.3 INJECTION, SOLUTION INTRAMUSCULAR; SUBCUTANEOUS EVERY 5 MIN PRN
Status: CANCELLED | OUTPATIENT
Start: 2019-05-08

## 2019-05-08 RX ORDER — DIPHENHYDRAMINE HYDROCHLORIDE 50 MG/ML
50 INJECTION INTRAMUSCULAR; INTRAVENOUS
Status: COMPLETED | OUTPATIENT
Start: 2019-05-08 | End: 2019-05-08

## 2019-05-08 RX ORDER — HEPARIN SODIUM (PORCINE) LOCK FLUSH IV SOLN 100 UNIT/ML 100 UNIT/ML
500 SOLUTION INTRAVENOUS ONCE
Status: COMPLETED | OUTPATIENT
Start: 2019-05-08 | End: 2019-05-08

## 2019-05-08 RX ORDER — SODIUM CHLORIDE 9 MG/ML
1000 INJECTION, SOLUTION INTRAVENOUS CONTINUOUS PRN
Status: DISCONTINUED | OUTPATIENT
Start: 2019-05-08 | End: 2019-05-08 | Stop reason: HOSPADM

## 2019-05-08 RX ORDER — DIPHENHYDRAMINE HYDROCHLORIDE 50 MG/ML
50 INJECTION INTRAMUSCULAR; INTRAVENOUS
Status: CANCELLED
Start: 2019-05-08

## 2019-05-08 RX ADMIN — DOCETAXEL 180 MG: 80 INJECTION, SOLUTION, CONCENTRATE INTRAVENOUS at 15:56

## 2019-05-08 RX ADMIN — DIPHENHYDRAMINE HYDROCHLORIDE 50 MG: 50 INJECTION, SOLUTION INTRAMUSCULAR; INTRAVENOUS at 16:05

## 2019-05-08 RX ADMIN — SODIUM CHLORIDE 1000 ML: 9 INJECTION, SOLUTION INTRAVENOUS at 16:05

## 2019-05-08 RX ADMIN — HEPARIN SODIUM (PORCINE) LOCK FLUSH IV SOLN 100 UNIT/ML 500 UNITS: 100 SOLUTION at 18:29

## 2019-05-08 RX ADMIN — METHYLPREDNISOLONE SODIUM SUCCINATE 125 MG: 125 INJECTION, POWDER, FOR SOLUTION INTRAMUSCULAR; INTRAVENOUS at 16:09

## 2019-05-08 RX ADMIN — Medication 5 ML: at 14:09

## 2019-05-08 RX ADMIN — SODIUM CHLORIDE 250 ML: 9 INJECTION, SOLUTION INTRAVENOUS at 15:34

## 2019-05-08 RX ADMIN — DEXAMETHASONE SODIUM PHOSPHATE 12 MG: 10 INJECTION, SOLUTION INTRAMUSCULAR; INTRAVENOUS at 15:39

## 2019-05-08 ASSESSMENT — PAIN SCALES - GENERAL: PAINLEVEL: NO PAIN (0)

## 2019-05-08 ASSESSMENT — MIFFLIN-ST. JEOR: SCORE: 1981.7

## 2019-05-08 NOTE — LETTER
"5/8/2019       RE: Walter Reyes  [address] [facility] [address]     Dear Colleague,    Thank you for referring your patient, Walter Reyes, to the [facility]. Please see a copy of my visit note below.    MEDICAL ONCOLOGY CLINIC NOTE    REFERRING PROVIDER: Jorge Alberto Yepez MD at [facility].    REASON FOR CURRENT VISIT:  Evaluation before cycle 1 of docetaxel + ADT for metastatic hormone-sensitive prostate cancer.    HISTORY OF PRESENT ILLNESS:  Mr. 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 His oncologic history is detailed below.  His daughter accompanies him for this visit.    Walter started ADT in mid Feb 2019 and underwent a robotic right nephrectomy with Dr. Cleveland on 03/19/19. The surgery was uneventful and he has recovered well. No treatment related side effects from ADT except for hot flashes (grade 1) at this time. No bone pain, hematuria, SOA/cough/CP etc. Chronic obstructive urinary symptoms unchanged. Wants to get started with docetaxel chemotherapy and has no neuropathy or infection symptoms.    No signs/symptoms concerning for disease progression.    ONCOLOGIC HISTORY:  1. Prostate adenocarcinoma, stage IV (M1b at diagnosis), high-volume, castration-sensitive:  - 12/13/2018: PSA found to be elevated to 9.1 ng/mL on a routine follow-up with primary care provider Dr. Naylor at [facility]. Prior PSA were 1.4 on 8/16/17, 2.4 on 6/28/16, 2.9 on 6/28/16, and as low as 0.4 on 3/26/2003.   - 1/08/2019: Consultation with Sarah Wilson CNP in Urology clinic. Repeat PSA 9.6.  - 1/16/2019: MRI prostate with contrast - \"This examination is characterized as PIRADS 5- very high probability. Clinically significant cancer is highly likely to be present. There is a large, invasive mass arising from the right peripheral zone and " "extending into the neurovascular bundle, seminal vesicle, and along the anterolateral right mesorectal fascia. Metastatic right external iliac lymph node. Metastatic lesion in the posterior/superior right acetabulum.\"  - 1/25/2019: CT abdomen and pelvis with IV contrast - \"1. Heterogeneous enhancing mass posteriorly in the upper pole of the right kidney measures 4.5 x 5.8 x 5.7 cm (AP by transverse by craniocaudal). It has a small nodular component extending posterior medially which abuts the right psoas muscle. This nodular extension measures 2.1 x 2.1 cm. Minimal stranding about the mass. No definite thrombus within the right renal vein. This renal mass is compatible with a renal cell carcinoma. A paraaortic lymph node situated immediately posterior to the left renal vein measures 1.1 cm in short axis, suspicious for metastasis. 2. A 2 cm right external iliac lymph node is also suspicious for metastases. 3. Multiple sclerotic osseous lesions suspicious for metastases. These include 0.8 and 0.6 cm sclerotic lesions laterally in the right iliac wing (images 53 and 62 respectively). Ill-defined groundglass density laterally in the right acetabulum measuring 1.7 x 1.2 cm corresponds with the lesion identified on MRI. A 0.4 cm groundglass density in the left acetabulum. Sclerotic lesion in the left femoral neck measures 0.9 x 1.6 cm (image 81). Sclerotic metastases would be more compatible with prostate metastases. 4. A 3 subcentimeter hepatic lesions are indeterminate. Metastases would be a consideration. These can be further characterized with liver MRI.\"  - 1/25/2019: NM bone scan - \"There is focal bony uptake in the left femoral neck, right acetabulum, right of midline at the S1 or L5 level of the spine, within multiple bilateral ribs, in the C7 or T1 level of the spine, and anteriorly within the skull. Findings are suspicious for metastases.\"  - 1/31/19: CT chest with contrast - \" No suspicious nodules in the " "chest.  Stable appearance of a 5.8 cm right renal mass concerning for renal carcinoma until proven otherwise.  Stable indeterminant subcentimeter hypodensities in the liver.  Multifocal osteoblastic metastasis including 2.5 cm lesion in the right fifth rib posteriorly and a 1.6 cm lesion in the right third rib posteriorly. No lytic lesions identified.\"  - 2/6/19: CT-guided right sclerotic fifth rib lesion biopsy - \"Metastatic carcinoma, consistent with prostate primary.  Immunohistochemical stains performed show the metastatic carcinoma stains positive for NKX3.1 (prostate marker), negative for STACIE 3 and PAX8, which supports the above diagnosis.\"  - 2/15/19: Started Casodex 50mg every day and consented for the biobanking protocol. 3/18/19 - stopped Casodex.  - 2/19/19: Case discussed in tumor board - recommendation for nephectomy for suspected malignant right renal mass.   - 02/26/19: Started Lupron 22.5mg every 3 months.  - 5/8/19: Started Docetaxel 75mg/m2 IV every 3 weeks for upto 6 cycles.     2. Stage III ([id number]), grade 3 of 4, clear-cell RCC of right kidney:  - Incidentally diagnosed as above.   - Underwent curative-intent robotic right radical nephrectomy with Dr. Wesley Cleveland on 3/19/19. No tumor spillage per op report.   - Path showed: \"Histologic Type: Clear cell renal cell carcinoma; Sarcomatoid Features: Not identified; Histologic Grade: Nucleolar grade 3 (WHO/ISUP). Extent Tumor Size: 4.3 cm. Microscopic Tumor Extension: Tumor extension into renal sinus (in vascular structures). Margins: Negative. Tumor Necrosis: Present; focal. Lymph-Vascular Invasion: Not identified.  Pathologic Staging (pTNM) Primary Tumor (pT): pT3a: Tumor extends into renal vein branches/renal sinus. Regional Lymph Nodes (pN): pNX. Number of Lymph Nodes Examined: 0 Distant Metastasis (pM): pM N/A.\"    REVIEW OF SYSTEMS: 14 point ROS negative other than the symptoms noted above in the HPI.    PAST MEDICAL HISTORY:  1. Prostate " cancer.  2. Hypothyroidism.  3. Allergic rhinitis.  4. Esophageal reflux disorder.  5. Dyslipidemia.  6. Glucose intolerance.  7. Obesity.     PAST SURGICAL HISTORY:   As above.    SOCIAL HISTORY:   He is  and lives with his wife, Micheline in [place]. He has 4 children and 3 sisters (2 elder brothers  from heart failure). Denies tobacco, alcohol or illicit drug use. 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    FAMILY HISTORY:   He does have a remarkable family history with several maternal relatives affected with cancer.  He has 5 siblings including one sister with skin cancer but no other malignancies.  2 of his brothers passed away at age 68 each because of smoking alcohol use and illicit drug use related heart failure.  Several maternal aunts with lymphoma, colon, liver, breast, and lung cancer, as well as a maternal uncle with lung cancer. His paternal uncle had stomach cancer.     ALLERGIES:   Allergies   Allergen Reactions     Doxycycline GI Disturbance     CURRENT MEDICATIONS:   Current Outpatient Medications:      amLODIPine (NORVASC) 10 MG tablet, Take 1 tablet (10 mg) by mouth daily, Disp: 30 tablet, Rfl: 0     atorvastatin (LIPITOR) 20 MG tablet, Take 20 mg by mouth daily, Disp: , Rfl:      calcium citrate-vitamin D (CITRACAL) 315-250 MG-UNIT TABS per tablet, Take 650 mg by mouth 2 times daily , Disp: , Rfl:      cetirizine (ZYRTEC) 10 MG tablet, Take 10 mg by mouth as needed, Disp: , Rfl:      cinnamon 500 MG CAPS, Take 500 mg by mouth daily , Disp: , Rfl:      cycloSPORINE (RESTASIS) 0.05 % ophthalmic emulsion, Place 1 drop into both eyes 2 times daily , Disp: , Rfl:      dexamethasone (DECADRON) 4 MG tablet, Take 8 mg by mouth 2 times daily Evening PRIOR and continue morning of Docetaxel, then 4 additional doses.., Disp: 12 tablet, Rfl: 5     fluticasone (FLONASE) 50 MCG/ACT nasal spray, Spray 2 sprays in nostril daily as needed , Disp: , Rfl:       Glucosamine-Chondroit-Vit C-Mn (GLUCOSAMINE 1500 COMPLEX) CAPS, Take 1 capsule by mouth daily, Disp: , Rfl:      levothyroxine (SYNTHROID/LEVOTHROID) 125 MCG tablet, Take 125 mcg by mouth daily, Disp: , Rfl:      Multiple Vitamins-Minerals (MULTIVITAMIN ADULT EXTRA C PO), Take 1 tablet by mouth every 24 hours, Disp: , Rfl:      Nutritional Supplements (SALMON OIL) CAPS, Take 1 capsule by mouth daily, Disp: , Rfl:      omeprazole (PRILOSEC) 20 MG DR capsule, Take 20 mg by mouth daily, Disp: , Rfl:      prochlorperazine (COMPAZINE) 10 MG tablet, Take 1 tablet (10 mg) by mouth every 6 hours as needed (Nausea/Vomiting), Disp: 30 tablet, Rfl: 5  No current facility-administered medications for this visit.     PHYSICAL EXAMINATION:  Vital signs: BP (!) 152/91 (BP Location: Left arm, Patient Position: Chair, Cuff Size: Adult Large)   Pulse 92   Temp 98  F (36.7  C) (Axillary)   Wt 111.9 kg (246 lb 9.6 oz)   SpO2 97%   BMI 33.44 kg/m     ECOG performance status of 1. Fatigue mild.  GENERAL: Well-nourished healthy-appearing man in chair, no acute distress.   HEENT: No icterus, no pallor. Moist mucous membranes. Oropharynx is clear. No mucosal abnormalities.  NECK: Supple, no JVD/LAD.  LUNGS: Clear to ausculation bilaterally, normal work of breathing.   CARDIOVASCULAR: Regular rate and rhythm, no murmurs, gallops or rubs.   ABDOMEN: Soft, nontender and nondistended, no palpable masses, bowel sounds present.  EXTREMITIES: No cyanosis, no clubbing, no edema.   NEUROLOGIC: No focal deficits, CN 2-12 intact.  LYMPH NODE EXAM: No palpable adenopathy - cervical, axillary or inguinal.   SKIN: Rash has resolved.    LABORATORY DATA:   Lab Test 05/08/19  1439 04/30/19  1627 03/26/19  1604   WBC 15.3* 6.1 7.4   RBC 4.27* 4.65 4.86   HGB 12.5* 13.4 13.4   HCT 35.7* 39.7* 42.5   MCV 84 85 87   MCH 29.3 28.8 27.6   MCHC 35.0 33.8 31.5   RDW 13.4 13.3 13.3    248 260   NEUTROPHIL 90.2 49.7 58.3    138 140   POTASSIUM 4.0  "3.8 3.9   CHLORIDE 107 106 107   CO2 20 24 26   ANIONGAP 11 8 7   * 96 96   BUN 28 28 23   CR 1.16 1.33* 1.37*   BETHANY 9.2 9.2 9.2   PROTTOTAL 7.7 8.0 7.9   ALBUMIN 3.9 3.9 3.5   BILITOTAL 0.4 0.2 0.3   ALKPHOS 71 82 116   AST 24 30 27   ALT 50 58 54     Lab Test 04/30/19  1627 03/26/19  1604 02/26/19  1425 02/15/19  1044   PSA 0.69 1.25 8.00* 12.30*   CGAB 1,309* 900* 654* 475*   TESTOSTTOTAL 7* 22* 462 222*   CGAB - Chromogranin A; TESTOSTTOTAL: Total testosterone.    IMAGING STUDIES:  As above.    ASSESSMENT AND PLAN: Mr. Reyes is a delightful 58-year-old gentleman with newly diagnosed metastatic castration sensitive prostate adenocarcinoma as well as a stage III clear-cell renal cell carcinoma of right kidney, who is here for a follow-up visit.     Metastatic prostate cancer:   - Patient meets the criteria for CHAARTED \"high-volume\" metastatic hormone-sensitive prostate cancer.  - I do not have a clinical trial for this gentleman at this time, especially given the suspicion for a concurrent malignancy.  He has enrolled in the institutional biobanking study on 2/15/19 but this is a non-interventional study.   - After a review of systemic therapy options, patient has opted to start docetaxel in combination with ADT (per JOSEFA, [id number], KARLA).   - Reviewed restaging scan results that show likely early response to ADT.  - Plan to start docetaxel today - regimen will be docetaxel 75mg/m2 IV every 3 weeks for up to 6 cycles. No primary prophy G-CSF.  - Risks/benefits of docetaxel including allergic reaction, fatigue, nausea/vomiting/constipation, myelosuppression, neuropathy, LFT changes etc discussed in detail.   - Continue Lupron 22.5mg every 3 monthly - next dose in end of May 2019.  - He understands the risks and benefits of ADT including hot flashes, mood changes and long-term cardiovascular, bone health, etc. Also understands the risks of docetaxel treatment including fatigue, nausea, " vomiting, diarrhea/constipation, hand-foot syndrome, allergic reactions, myelosuppression with increased risk of infection and bleeding etc.  - Will repeat restaging scans after 3 cycles.   - Will also add antiresorptive therapy based on restaging results after chemo.     Stage 3, UISS-high risk ccRCC:  - Patient understands that he has a stage III, UISS high risk clear-cell renal cell carcinoma with approximately 40-50% risk of recurrence after definitive surgery.   - At this time, he has no clear evidence of residual disease.  The retroperitoneal lymphadenopathy is more consistent with metastatic prostate cancer.  - He understands the role of adjuvant therapies in RCC and that at best, the data for adjuvant TKI therapy is mixed and shows an improvement in disease-free survival and not overall survival.   - Continue active surveillance with self-reporting for signs/symptoms of recurrence (explained in detail) and periodic H&P and scans.     Genetics referral:  - Most striking is the finding of 2 concurrent malignancies in this relatively young gentleman with a very high risk family history of malignancies.  While biologically there is a little overlap between prostate cancer and renal cancer, this can be seen in some inherited syndromes.  If present, such an aberration could have implications for his prostate cancer biology as well as clinical trajectory.  - Has been seen by Genetics clinic on 3/7/19. Defer further workup and management to them.     Research participation:  - Enrolled in  biobanking study.    Fertility preservation:  - Patient, Walter Reyes, meets the criteria for fertility preservation:  - The patient has declined fertility preservation--I discussed the possible risk of temporary or irreversible impairment of the fertility with the patient today. He demonstrated a complete understanding of the fertility preservation options and declined fertility preservation. The patient provided the  "following reason: \"has 4 children, doesn't want any more\" for this choice.    Return to see me in 3 weeks.     Patient and family were very appreciative of the care provided and in complete agreement this plan.  All questions were answered.    BILLIN.    Uday Smalls M.D.  . Professor of Medicine  Genitourinary Oncology  Division of Hematology, Oncology & Transplantation  [facility]    ADDENDUM: 5/10/19 - patient developed a grade 1 allergic reaction with cycle 1. Dose interrupted and given solumedrol and benadryl. RN successfully able to reinitiate docetaxel and patient tolerated remainder of infusion well. Will add dex and benadryl premeds for all subsequent cycles.    Again, thank you for allowing me to participate in the care of your patient.      Sincerely,    Uday Smalls MD      "

## 2019-05-08 NOTE — NURSING NOTE
Chief Complaint   Patient presents with     Port Draw     labs drawn via port by RN     BP (!) 152/91 (BP Location: Left arm, Patient Position: Chair, Cuff Size: Adult Large)   Pulse 92   Temp 98  F (36.7  C) (Axillary)   Wt 111.9 kg (246 lb 9.6 oz)   SpO2 97%   BMI 33.44 kg/m      Port accessed by RN in lab. Labs collected and sent. Line flushed with NS & Heparin. Pt tolerated well.   Pt checked in for next appointment.    Iram Rodriguez, RN

## 2019-05-08 NOTE — PROGRESS NOTES
Infusion Nursing Note:  Walter Reyes presents today for C1D1 Taxotere.    Patient seen by provider today: Yes: Uday Smalls MD   present during visit today: Not Applicable.    Note: First time getting chemotherapy today.Chemotherapy teaching, side effects, and schedule reviewed with patient. Pt instructed to call triage (or MD on call if after hours/weekends) with chills/temp >=100.5. Pt stated understanding of plan. PO Dexamethasone taken last night and this morning.     IV Taxotere rate as follows:    999 ml/hr x 14ml  10 ml/hr x 5 minutes   25 ml/hr x 5 minutes   50 ml/hr x 5 minutes   100 ml/hr x 5 minutes   282 ml/hr x 5 minutes     Upon IV Taxotere infusion, patient complaint of warm feeling on upper and lower extremities accompanied with facial flushing. Denies SOB nor chest and abdominal discomfort. Denies nausea/vomiting. VS stable. Estimated IV Taxotere went in 16.4ml. Stopped.     Patient c/o mild chills. Denies SOB, chest and abdominal discomfort.     IV Taxotere re-started    25 ml/hr x 15 minutes   50 ml/hr x 15 minutes   100 ml/hr x 15 minutes   150 ml/hr x 15 minutes   200 ml/hr x remainder      Appointment is not made yet by the time patient left the facility. Instructed patient if unable to see next few days to call . Verbalized understanding.     InBasket sent to , if need to do additional premeds for next infusion. Patient is already on PO Dexamethasone.   Intravenous Access:  Implanted Port.    Treatment Conditions:  Lab Results   Component Value Date    HGB 12.5 05/08/2019     Lab Results   Component Value Date    WBC 15.3 05/08/2019      Lab Results   Component Value Date    ANEU 13.9 05/08/2019     Lab Results   Component Value Date     05/08/2019      Lab Results   Component Value Date     05/08/2019                   Lab Results   Component Value Date    POTASSIUM 4.0 05/08/2019           No results found for: MAG         Lab Results    Component Value Date    CR 1.16 05/08/2019                   Lab Results   Component Value Date    BETHANY 9.2 05/08/2019                Lab Results   Component Value Date    BILITOTAL 0.4 05/08/2019           Lab Results   Component Value Date    ALBUMIN 3.9 05/08/2019                    Lab Results   Component Value Date    ALT 50 05/08/2019           Lab Results   Component Value Date    AST 24 05/08/2019       Results reviewed, labs MET treatment parameters, ok to proceed with treatment.    TORB: 5/8/19/1615h/ Uday Smalls MD/Karen Haines RN/ Symptom noted. May restart IV Taxotere once symptom subsided. To titrate at 25 ml/hr for 15 minutes max rate of 200 ml/hr. If reacted again, give another dose of IV Benadryl 50 mg and IV Solu-medrol 125 mg and page me.     Post Infusion Assessment:  Patient tolerated infusion without incident.  Blood return noted pre and post infusion.  Site patent and intact, free from redness, edema or discomfort.  No evidence of extravasations.  Access discontinued per protocol.       Discharge Plan:   Prescription refills given for Compazine and Dexamethasone.  Discharge instructions reviewed with: Patient and Family.  Patient and/or family verbalized understanding of discharge instructions and all questions answered.  Copy of AVS reviewed with patient and/or family.  Patient will return 3 weeks from today for next appointment.  Patient discharged in stable condition accompanied by: self and daughter.  Departure Mode: Ambulatory.  Face to Face time: 5.    ANDRESSA GAMBOA, KATYH

## 2019-05-08 NOTE — PATIENT INSTRUCTIONS
Contact Numbers  [facility]: [phone]    After Hours:  [phone]  Triage: [phone]    Please call the [facility] Triage line if you experience a temperature greater than or equal to 100.5, shaking chills, have uncontrolled nausea, vomiting and/or diarrhea, dizziness, shortness of breath, chest pain, bleeding, unexplained bruising, or if you have any other new/concerning symptoms, questions or concerns.     If it is after hours, weekends, or holidays, please call the main hospital  at  [phone] and ask to speak to the Oncology doctor on call.     If you are having any concerning symptoms or wish to speak to a provider before your next infusion visit, please call your care coordinator or triage to notify them so we can adequately serve you.     If you need a refill on a narcotic prescription or other medication, please call triage before your infusion appointment.         May 2019      Jorge Monday Tuesday Wednesday Thursday Friday Saturday                  1     2    Outpatient Visit   9:19 AM   [facility]    IR CHEST PORT PLACEMENT >5 YRS   9:30 AM   (60 min.)   [id number]   [facility]    INSERTION, VASCULAR ACCESS PORT  11:00 AM   Tate Burciaga PA-C   [facility] OR 3    NM INJ   7:00 AM   (15 min.)   [id number]   [facility], Nuclear Medicine    CT CHEST ABDOMEN PELVIS WWO   8:00 AM   (20 min.)   UUCT1   [facility], CT    NM BONE SCAN WHOLE BODY  10:00 AM   (60 min.)   UUNM3   [facility], Nuclear Medicine 4       5     6     7     8    [facility] DRAW   2:00 PM   (15 min.)    [facility] LAB DRAW   [facility] Lab Draw    [facility] RETURN   2:15 PM   (30 min.)   Uday Smalls MD   [facility] ONC INFUSION 120   3:00 PM   (120 min.)    ONCOLOGY INFUSION   [facility] 9     10     11       12     13     14     15     16     17     18       19     20     21     22     23     24     25        26     27     28     29     30     31 June 2019 Sunday Monday Tuesday Wednesday Thursday Friday Saturday                                 1       2     3     4     5     6     7     8       9     10     11     12     13     14     15       16     17     18     19     20     21     22       23     24     25     26     27     28     29       30                                                   Lab Results:  Recent Results (from the past 12 hour(s))   CBC with platelets differential    Collection Time: 05/08/19  2:39 PM   Result Value Ref Range    WBC 15.3 (H) 4.0 - 11.0 10e9/L    RBC Count 4.27 (L) 4.4 - 5.9 10e12/L    Hemoglobin 12.5 (L) 13.3 - 17.7 g/dL    Hematocrit 35.7 (L) 40.0 - 53.0 %    MCV 84 78 - 100 fl    MCH 29.3 26.5 - 33.0 pg    MCHC 35.0 31.5 - 36.5 g/dL    RDW 13.4 10.0 - 15.0 %    Platelet Count 274 150 - 450 10e9/L    Diff Method Automated Method     % Neutrophils 90.2 %    % Lymphocytes 7.4 %    % Monocytes 1.8 %    % Eosinophils 0.0 %    % Basophils 0.1 %    % Immature Granulocytes 0.5 %    Nucleated RBCs 0 0 /100    Absolute Neutrophil 13.9 (H) 1.6 - 8.3 10e9/L    Absolute Lymphocytes 1.1 0.8 - 5.3 10e9/L    Absolute Monocytes 0.3 0.0 - 1.3 10e9/L    Absolute Eosinophils 0.0 0.0 - 0.7 10e9/L    Absolute Basophils 0.0 0.0 - 0.2 10e9/L    Abs Immature Granulocytes 0.1 0 - 0.4 10e9/L    Absolute Nucleated RBC 0.0    Comprehensive metabolic panel    Collection Time: 05/08/19  2:39 PM   Result Value Ref Range    Sodium 138 133 - 144 mmol/L    Potassium 4.0 3.4 - 5.3 mmol/L    Chloride 107 94 - 109 mmol/L    Carbon Dioxide 20 20 - 32 mmol/L    Anion Gap 11 3 - 14 mmol/L    Glucose 135 (H) 70 - 99 mg/dL    Urea Nitrogen 28 7 - 30 mg/dL    Creatinine 1.16 0.66 - 1.25 mg/dL    GFR Estimate 69 >60 mL/min/[1.73_m2]    GFR Estimate If Black 80 >60 mL/min/[1.73_m2]    Calcium 9.2 8.5 - 10.1 mg/dL    Bilirubin Total 0.4 0.2 - 1.3 mg/dL    Albumin 3.9 3.4 - 5.0 g/dL    Protein Total  7.7 6.8 - 8.8 g/dL    Alkaline Phosphatase 71 40 - 150 U/L    ALT 50 0 - 70 U/L    AST 24 0 - 45 U/L   Bilirubin direct    Collection Time: 05/08/19  2:39 PM   Result Value Ref Range    Bilirubin Direct <0.1 0.0 - 0.2 mg/dL

## 2019-05-08 NOTE — NURSING NOTE
"Oncology Rooming Note    May 8, 2019 2:44 PM   Walter Reyes is a 59 year old male who presents for:    Chief Complaint   Patient presents with     Port Draw     labs drawn via port by RN     Oncology Clinic Visit     Return visit related to Prostate Cancer     Initial Vitals: BP (!) 152/91 (BP Location: Left arm, Patient Position: Chair, Cuff Size: Adult Large)   Pulse 92   Temp 98  F (36.7  C) (Axillary)   Ht 1.829 m (6' 0.01\")   Wt 111.9 kg (246 lb 9.6 oz)   SpO2 97%   BMI 33.44 kg/m   Estimated body mass index is 33.44 kg/m  as calculated from the following:    Height as of this encounter: 1.829 m (6' 0.01\").    Weight as of this encounter: 111.9 kg (246 lb 9.6 oz). Body surface area is 2.38 meters squared.  No Pain (0) Comment: Data Unavailable   No LMP for male patient.  Allergies reviewed: Yes  Medications reviewed: Yes    Medications: Medication refills not needed today.  Pharmacy name entered into EPIC: [facility] [place], [place] - [address] [address]    Clinical concerns: No new concerns. Provider was notified.      Nati Preston LPN            "

## 2019-05-08 NOTE — PROGRESS NOTES
"MEDICAL ONCOLOGY CLINIC NOTE    REFERRING PROVIDER: Jorge Alberto Yepez MD at [facility].    REASON FOR CURRENT VISIT:  Evaluation before cycle 1 of docetaxel + ADT for metastatic hormone-sensitive prostate cancer.    HISTORY OF PRESENT ILLNESS:  Mr. 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 His oncologic history is detailed below.  His daughter accompanies him for this visit.    Walter started ADT in mid Feb 2019 and underwent a robotic right nephrectomy with Dr. Cleveland on 03/19/19. The surgery was uneventful and he has recovered well. No treatment related side effects from ADT except for hot flashes (grade 1) at this time. No bone pain, hematuria, SOA/cough/CP etc. Chronic obstructive urinary symptoms unchanged. Wants to get started with docetaxel chemotherapy and has no neuropathy or infection symptoms.    No signs/symptoms concerning for disease progression.    ONCOLOGIC HISTORY:  1. Prostate adenocarcinoma, stage IV (M1b at diagnosis), high-volume, castration-sensitive:  - 12/13/2018: PSA found to be elevated to 9.1 ng/mL on a routine follow-up with primary care provider Dr. Naylor at [facility]. Prior PSA were 1.4 on 8/16/17, 2.4 on 6/28/16, 2.9 on 6/28/16, and as low as 0.4 on 3/26/2003.   - 1/08/2019: Consultation with Sarah Wilson CNP in Urology clinic. Repeat PSA 9.6.  - 1/16/2019: MRI prostate with contrast - \"This examination is characterized as PIRADS 5- very high probability. Clinically significant cancer is highly likely to be present. There is a large, invasive mass arising from the right peripheral zone and extending into the neurovascular bundle, seminal vesicle, and along the anterolateral right mesorectal fascia. Metastatic right external iliac lymph node. Metastatic lesion in the posterior/superior right acetabulum.\"  - 1/25/2019: CT abdomen and pelvis with IV " "contrast - \"1. Heterogeneous enhancing mass posteriorly in the upper pole of the right kidney measures 4.5 x 5.8 x 5.7 cm (AP by transverse by craniocaudal). It has a small nodular component extending posterior medially which abuts the right psoas muscle. This nodular extension measures 2.1 x 2.1 cm. Minimal stranding about the mass. No definite thrombus within the right renal vein. This renal mass is compatible with a renal cell carcinoma. A paraaortic lymph node situated immediately posterior to the left renal vein measures 1.1 cm in short axis, suspicious for metastasis. 2. A 2 cm right external iliac lymph node is also suspicious for metastases. 3. Multiple sclerotic osseous lesions suspicious for metastases. These include 0.8 and 0.6 cm sclerotic lesions laterally in the right iliac wing (images 53 and 62 respectively). Ill-defined groundglass density laterally in the right acetabulum measuring 1.7 x 1.2 cm corresponds with the lesion identified on MRI. A 0.4 cm groundglass density in the left acetabulum. Sclerotic lesion in the left femoral neck measures 0.9 x 1.6 cm (image 81). Sclerotic metastases would be more compatible with prostate metastases. 4. A 3 subcentimeter hepatic lesions are indeterminate. Metastases would be a consideration. These can be further characterized with liver MRI.\"  - 1/25/2019: NM bone scan - \"There is focal bony uptake in the left femoral neck, right acetabulum, right of midline at the S1 or L5 level of the spine, within multiple bilateral ribs, in the C7 or T1 level of the spine, and anteriorly within the skull. Findings are suspicious for metastases.\"  - 1/31/19: CT chest with contrast - \" No suspicious nodules in the chest.  Stable appearance of a 5.8 cm right renal mass concerning for renal carcinoma until proven otherwise.  Stable indeterminant subcentimeter hypodensities in the liver.  Multifocal osteoblastic metastasis including 2.5 cm lesion in the right fifth rib " "posteriorly and a 1.6 cm lesion in the right third rib posteriorly. No lytic lesions identified.\"  - 2/6/19: CT-guided right sclerotic fifth rib lesion biopsy - \"Metastatic carcinoma, consistent with prostate primary.  Immunohistochemical stains performed show the metastatic carcinoma stains positive for NKX3.1 (prostate marker), negative for STACIE 3 and PAX8, which supports the above diagnosis.\"  - 2/15/19: Started Casodex 50mg every day and consented for the biobanking protocol. 3/18/19 - stopped Casodex.  - 2/19/19: Case discussed in tumor board - recommendation for nephectomy for suspected malignant right renal mass.   - 02/26/19: Started Lupron 22.5mg every 3 months.  - 5/8/19: Started Docetaxel 75mg/m2 IV every 3 weeks for upto 6 cycles.     2. Stage III ([id number]), grade 3 of 4, clear-cell RCC of right kidney:  - Incidentally diagnosed as above.   - Underwent curative-intent robotic right radical nephrectomy with Dr. Wesley Cleveland on 3/19/19. No tumor spillage per op report.   - Path showed: \"Histologic Type: Clear cell renal cell carcinoma; Sarcomatoid Features: Not identified; Histologic Grade: Nucleolar grade 3 (WHO/ISUP). Extent Tumor Size: 4.3 cm. Microscopic Tumor Extension: Tumor extension into renal sinus (in vascular structures). Margins: Negative. Tumor Necrosis: Present; focal. Lymph-Vascular Invasion: Not identified.  Pathologic Staging (pTNM) Primary Tumor (pT): pT3a: Tumor extends into renal vein branches/renal sinus. Regional Lymph Nodes (pN): pNX. Number of Lymph Nodes Examined: 0 Distant Metastasis (pM): pM N/A.\"    REVIEW OF SYSTEMS: 14 point ROS negative other than the symptoms noted above in the HPI.    PAST MEDICAL HISTORY:  1. Prostate cancer.  2. Hypothyroidism.  3. Allergic rhinitis.  4. Esophageal reflux disorder.  5. Dyslipidemia.  6. Glucose intolerance.  7. Obesity.     PAST SURGICAL HISTORY:   As above.    SOCIAL HISTORY:   He is  and lives with his wife, Micheline in " [place]. He has 4 children and 3 sisters (2 elder brothers  from heart failure). Denies tobacco, alcohol or illicit drug use. 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    FAMILY HISTORY:   He does have a remarkable family history with several maternal relatives affected with cancer.  He has 5 siblings including one sister with skin cancer but no other malignancies.  2 of his brothers passed away at age 68 each because of smoking alcohol use and illicit drug use related heart failure.  Several maternal aunts with lymphoma, colon, liver, breast, and lung cancer, as well as a maternal uncle with lung cancer. His paternal uncle had stomach cancer.     ALLERGIES:   Allergies   Allergen Reactions     Doxycycline GI Disturbance     CURRENT MEDICATIONS:   Current Outpatient Medications:      amLODIPine (NORVASC) 10 MG tablet, Take 1 tablet (10 mg) by mouth daily, Disp: 30 tablet, Rfl: 0     atorvastatin (LIPITOR) 20 MG tablet, Take 20 mg by mouth daily, Disp: , Rfl:      calcium citrate-vitamin D (CITRACAL) 315-250 MG-UNIT TABS per tablet, Take 650 mg by mouth 2 times daily , Disp: , Rfl:      cetirizine (ZYRTEC) 10 MG tablet, Take 10 mg by mouth as needed, Disp: , Rfl:      cinnamon 500 MG CAPS, Take 500 mg by mouth daily , Disp: , Rfl:      cycloSPORINE (RESTASIS) 0.05 % ophthalmic emulsion, Place 1 drop into both eyes 2 times daily , Disp: , Rfl:      dexamethasone (DECADRON) 4 MG tablet, Take 8 mg by mouth 2 times daily Evening PRIOR and continue morning of Docetaxel, then 4 additional doses.., Disp: 12 tablet, Rfl: 5     fluticasone (FLONASE) 50 MCG/ACT nasal spray, Spray 2 sprays in nostril daily as needed , Disp: , Rfl:      Glucosamine-Chondroit-Vit C-Mn (GLUCOSAMINE 1500 COMPLEX) CAPS, Take 1 capsule by mouth daily, Disp: , Rfl:      levothyroxine (SYNTHROID/LEVOTHROID) 125 MCG tablet, Take 125 mcg by mouth daily, Disp: , Rfl:      Multiple Vitamins-Minerals  (MULTIVITAMIN ADULT EXTRA C PO), Take 1 tablet by mouth every 24 hours, Disp: , Rfl:      Nutritional Supplements (SALMON OIL) CAPS, Take 1 capsule by mouth daily, Disp: , Rfl:      omeprazole (PRILOSEC) 20 MG DR capsule, Take 20 mg by mouth daily, Disp: , Rfl:      prochlorperazine (COMPAZINE) 10 MG tablet, Take 1 tablet (10 mg) by mouth every 6 hours as needed (Nausea/Vomiting), Disp: 30 tablet, Rfl: 5  No current facility-administered medications for this visit.     PHYSICAL EXAMINATION:  Vital signs: BP (!) 152/91 (BP Location: Left arm, Patient Position: Chair, Cuff Size: Adult Large)   Pulse 92   Temp 98  F (36.7  C) (Axillary)   Wt 111.9 kg (246 lb 9.6 oz)   SpO2 97%   BMI 33.44 kg/m    ECOG performance status of 1. Fatigue mild.  GENERAL: Well-nourished healthy-appearing man in chair, no acute distress.   HEENT: No icterus, no pallor. Moist mucous membranes. Oropharynx is clear. No mucosal abnormalities.  NECK: Supple, no JVD/LAD.  LUNGS: Clear to ausculation bilaterally, normal work of breathing.   CARDIOVASCULAR: Regular rate and rhythm, no murmurs, gallops or rubs.   ABDOMEN: Soft, nontender and nondistended, no palpable masses, bowel sounds present.  EXTREMITIES: No cyanosis, no clubbing, no edema.   NEUROLOGIC: No focal deficits, CN 2-12 intact.  LYMPH NODE EXAM: No palpable adenopathy - cervical, axillary or inguinal.   SKIN: Rash has resolved.    LABORATORY DATA:   Lab Test 05/08/19  1439 04/30/19  1627 03/26/19  1604   WBC 15.3* 6.1 7.4   RBC 4.27* 4.65 4.86   HGB 12.5* 13.4 13.4   HCT 35.7* 39.7* 42.5   MCV 84 85 87   MCH 29.3 28.8 27.6   MCHC 35.0 33.8 31.5   RDW 13.4 13.3 13.3    248 260   NEUTROPHIL 90.2 49.7 58.3    138 140   POTASSIUM 4.0 3.8 3.9   CHLORIDE 107 106 107   CO2 20 24 26   ANIONGAP 11 8 7   * 96 96   BUN 28 28 23   CR 1.16 1.33* 1.37*   BETHANY 9.2 9.2 9.2   PROTTOTAL 7.7 8.0 7.9   ALBUMIN 3.9 3.9 3.5   BILITOTAL 0.4 0.2 0.3   ALKPHOS 71 82 116   AST 24 30 27  "  ALT 50 58 54     Lab Test 04/30/19  1627 03/26/19  1604 02/26/19  1425 02/15/19  1044   PSA 0.69 1.25 8.00* 12.30*   CGAB 1,309* 900* 654* 475*   TESTOSTTOTAL 7* 22* 462 222*   CGAB - Chromogranin A; TESTOSTTOTAL: Total testosterone.    IMAGING STUDIES:  As above.    ASSESSMENT AND PLAN: Mr. Reyes is a delightful 58-year-old gentleman with newly diagnosed metastatic castration sensitive prostate adenocarcinoma as well as a stage III clear-cell renal cell carcinoma of right kidney, who is here for a follow-up visit.     Metastatic prostate cancer:   - Patient meets the criteria for CHAARTED \"high-volume\" metastatic hormone-sensitive prostate cancer.  - I do not have a clinical trial for this gentleman at this time, especially given the suspicion for a concurrent malignancy.  He has enrolled in the institutional biobanking study on 2/15/19 but this is a non-interventional study.   - After a review of systemic therapy options, patient has opted to start docetaxel in combination with ADT (per JOSEFA, [id number], KARLA).   - Reviewed restaging scan results that show likely early response to ADT.  - Plan to start docetaxel today - regimen will be docetaxel 75mg/m2 IV every 3 weeks for up to 6 cycles. No primary prophy G-CSF.  - Risks/benefits of docetaxel including allergic reaction, fatigue, nausea/vomiting/constipation, myelosuppression, neuropathy, LFT changes etc discussed in detail.   - Continue Lupron 22.5mg every 3 monthly - next dose in end of May 2019.  - He understands the risks and benefits of ADT including hot flashes, mood changes and long-term cardiovascular, bone health, etc. Also understands the risks of docetaxel treatment including fatigue, nausea, vomiting, diarrhea/constipation, hand-foot syndrome, allergic reactions, myelosuppression with increased risk of infection and bleeding etc.  - Will repeat restaging scans after 3 cycles.   - Will also add antiresorptive therapy based on " "restaging results after chemo.     Stage 3, UISS-high risk ccRCC:  - Patient understands that he has a stage III, UISS high risk clear-cell renal cell carcinoma with approximately 40-50% risk of recurrence after definitive surgery.   - At this time, he has no clear evidence of residual disease.  The retroperitoneal lymphadenopathy is more consistent with metastatic prostate cancer.  - He understands the role of adjuvant therapies in RCC and that at best, the data for adjuvant TKI therapy is mixed and shows an improvement in disease-free survival and not overall survival.   - Continue active surveillance with self-reporting for signs/symptoms of recurrence (explained in detail) and periodic H&P and scans.     Genetics referral:  - Most striking is the finding of 2 concurrent malignancies in this relatively young gentleman with a very high risk family history of malignancies.  While biologically there is a little overlap between prostate cancer and renal cancer, this can be seen in some inherited syndromes.  If present, such an aberration could have implications for his prostate cancer biology as well as clinical trajectory.  - Has been seen by Genetics clinic on 3/7/19. Defer further workup and management to them.     Research participation:  - Enrolled in  biobanking study.    Fertility preservation:  - Patient, Walter Reyes, meets the criteria for fertility preservation:  - The patient has declined fertility preservation--I discussed the possible risk of temporary or irreversible impairment of the fertility with the patient today. He demonstrated a complete understanding of the fertility preservation options and declined fertility preservation. The patient provided the following reason: \"has 4 children, doesn't want any more\" for this choice.    Return to see me in 3 weeks.     Patient and family were very appreciative of the care provided and in complete agreement this plan.  All questions were " answered.    BILLIN.    Uday Smalls M.D.  . Professor of Medicine  Genitourinary Oncology  Division of Hematology, Oncology & Transplantation  [facility]    ADDENDUM: 5/10/19 - patient developed a grade 1 allergic reaction with cycle 1. Dose interrupted and given solumedrol and benadryl. RN successfully able to reinitiate docetaxel and patient tolerated remainder of infusion well. Will add dex and benadryl premeds for all subsequent cycles.

## 2019-05-29 ENCOUNTER — ONCOLOGY VISIT (OUTPATIENT)
Dept: ONCOLOGY | Facility: CLINIC | Age: 59
End: 2019-05-29
Attending: INTERNAL MEDICINE
Payer: COMMERCIAL

## 2019-05-29 VITALS
WEIGHT: 249.3 LBS | HEART RATE: 86 BPM | HEIGHT: 72 IN | SYSTOLIC BLOOD PRESSURE: 136 MMHG | DIASTOLIC BLOOD PRESSURE: 78 MMHG | BODY MASS INDEX: 33.77 KG/M2 | TEMPERATURE: 98.5 F | OXYGEN SATURATION: 100 % | RESPIRATION RATE: 16 BRPM

## 2019-05-29 DIAGNOSIS — C61 MALIGNANT NEOPLASM OF PROSTATE METASTATIC TO BONE (H): Primary | ICD-10-CM

## 2019-05-29 DIAGNOSIS — C79.51 MALIGNANT NEOPLASM OF PROSTATE METASTATIC TO BONE (H): Primary | ICD-10-CM

## 2019-05-29 DIAGNOSIS — C79.51 MALIGNANT NEOPLASM OF PROSTATE METASTATIC TO BONE (H): ICD-10-CM

## 2019-05-29 DIAGNOSIS — C61 MALIGNANT NEOPLASM OF PROSTATE METASTATIC TO BONE (H): ICD-10-CM

## 2019-05-29 LAB
ALBUMIN SERPL-MCNC: 3.8 G/DL (ref 3.4–5)
ALP SERPL-CCNC: 67 U/L (ref 40–150)
ALT SERPL W P-5'-P-CCNC: 50 U/L (ref 0–70)
ANION GAP SERPL CALCULATED.3IONS-SCNC: 8 MMOL/L (ref 3–14)
AST SERPL W P-5'-P-CCNC: 28 U/L (ref 0–45)
BASOPHILS # BLD AUTO: 0 10E9/L (ref 0–0.2)
BASOPHILS NFR BLD AUTO: 0.2 %
BILIRUB SERPL-MCNC: 0.4 MG/DL (ref 0.2–1.3)
BUN SERPL-MCNC: 17 MG/DL (ref 7–30)
CALCIUM SERPL-MCNC: 9.9 MG/DL (ref 8.5–10.1)
CHLORIDE SERPL-SCNC: 105 MMOL/L (ref 94–109)
CO2 SERPL-SCNC: 25 MMOL/L (ref 20–32)
CREAT SERPL-MCNC: 1.04 MG/DL (ref 0.66–1.25)
DIFFERENTIAL METHOD BLD: ABNORMAL
EOSINOPHIL # BLD AUTO: 0 10E9/L (ref 0–0.7)
EOSINOPHIL NFR BLD AUTO: 0.1 %
ERYTHROCYTE [DISTWIDTH] IN BLOOD BY AUTOMATED COUNT: 14.4 % (ref 10–15)
GFR SERPL CREATININE-BSD FRML MDRD: 79 ML/MIN/{1.73_M2}
GLUCOSE SERPL-MCNC: 123 MG/DL (ref 70–99)
HCT VFR BLD AUTO: 36.7 % (ref 40–53)
HGB BLD-MCNC: 12.6 G/DL (ref 13.3–17.7)
IMM GRANULOCYTES # BLD: 0.1 10E9/L (ref 0–0.4)
IMM GRANULOCYTES NFR BLD: 1.1 %
LDH SERPL L TO P-CCNC: 232 U/L (ref 85–227)
LYMPHOCYTES # BLD AUTO: 1.2 10E9/L (ref 0.8–5.3)
LYMPHOCYTES NFR BLD AUTO: 9.6 %
MCH RBC QN AUTO: 29.4 PG (ref 26.5–33)
MCHC RBC AUTO-ENTMCNC: 34.3 G/DL (ref 31.5–36.5)
MCV RBC AUTO: 86 FL (ref 78–100)
MONOCYTES # BLD AUTO: 0.4 10E9/L (ref 0–1.3)
MONOCYTES NFR BLD AUTO: 2.9 %
NEUTROPHILS # BLD AUTO: 10.6 10E9/L (ref 1.6–8.3)
NEUTROPHILS NFR BLD AUTO: 86.1 %
NRBC # BLD AUTO: 0 10*3/UL
NRBC BLD AUTO-RTO: 0 /100
PLATELET # BLD AUTO: 215 10E9/L (ref 150–450)
POTASSIUM SERPL-SCNC: 4 MMOL/L (ref 3.4–5.3)
PROT SERPL-MCNC: 7.8 G/DL (ref 6.8–8.8)
PSA SERPL-MCNC: 1.18 UG/L (ref 0–4)
RBC # BLD AUTO: 4.28 10E12/L (ref 4.4–5.9)
SODIUM SERPL-SCNC: 139 MMOL/L (ref 133–144)
WBC # BLD AUTO: 12.3 10E9/L (ref 4–11)

## 2019-05-29 PROCEDURE — 96375 TX/PRO/DX INJ NEW DRUG ADDON: CPT

## 2019-05-29 PROCEDURE — G0463 HOSPITAL OUTPT CLINIC VISIT: HCPCS | Mod: ZF

## 2019-05-29 PROCEDURE — 96402 CHEMO HORMON ANTINEOPL SQ/IM: CPT

## 2019-05-29 PROCEDURE — 99214 OFFICE O/P EST MOD 30 MIN: CPT | Mod: ZP | Performed by: INTERNAL MEDICINE

## 2019-05-29 PROCEDURE — 80053 COMPREHEN METABOLIC PANEL: CPT | Performed by: INTERNAL MEDICINE

## 2019-05-29 PROCEDURE — 83615 LACTATE (LD) (LDH) ENZYME: CPT | Performed by: INTERNAL MEDICINE

## 2019-05-29 PROCEDURE — 25000128 H RX IP 250 OP 636: Mod: ZF | Performed by: INTERNAL MEDICINE

## 2019-05-29 PROCEDURE — 84153 ASSAY OF PSA TOTAL: CPT | Performed by: INTERNAL MEDICINE

## 2019-05-29 PROCEDURE — 96367 TX/PROPH/DG ADDL SEQ IV INF: CPT

## 2019-05-29 PROCEDURE — 84403 ASSAY OF TOTAL TESTOSTERONE: CPT | Performed by: INTERNAL MEDICINE

## 2019-05-29 PROCEDURE — 96413 CHEMO IV INFUSION 1 HR: CPT

## 2019-05-29 PROCEDURE — 85025 COMPLETE CBC W/AUTO DIFF WBC: CPT | Performed by: INTERNAL MEDICINE

## 2019-05-29 PROCEDURE — 25800030 ZZH RX IP 258 OP 636: Mod: ZF | Performed by: INTERNAL MEDICINE

## 2019-05-29 PROCEDURE — 86316 IMMUNOASSAY TUMOR OTHER: CPT | Performed by: INTERNAL MEDICINE

## 2019-05-29 RX ORDER — DIPHENHYDRAMINE HYDROCHLORIDE 50 MG/ML
50 INJECTION INTRAMUSCULAR; INTRAVENOUS ONCE
Status: COMPLETED | OUTPATIENT
Start: 2019-05-29 | End: 2019-05-29

## 2019-05-29 RX ORDER — ALBUTEROL SULFATE 90 UG/1
1-2 AEROSOL, METERED RESPIRATORY (INHALATION)
Status: CANCELLED
Start: 2019-05-29

## 2019-05-29 RX ORDER — LORAZEPAM 2 MG/ML
0.5 INJECTION INTRAMUSCULAR EVERY 4 HOURS PRN
Status: CANCELLED
Start: 2019-05-29

## 2019-05-29 RX ORDER — HEPARIN SODIUM (PORCINE) LOCK FLUSH IV SOLN 100 UNIT/ML 100 UNIT/ML
5 SOLUTION INTRAVENOUS ONCE
Status: COMPLETED | OUTPATIENT
Start: 2019-05-29 | End: 2019-05-29

## 2019-05-29 RX ORDER — EPINEPHRINE 1 MG/ML
0.3 INJECTION, SOLUTION INTRAMUSCULAR; SUBCUTANEOUS EVERY 5 MIN PRN
Status: CANCELLED | OUTPATIENT
Start: 2019-05-29

## 2019-05-29 RX ORDER — DIPHENHYDRAMINE HYDROCHLORIDE 50 MG/ML
50 INJECTION INTRAMUSCULAR; INTRAVENOUS
Status: CANCELLED
Start: 2019-05-29

## 2019-05-29 RX ORDER — DIPHENHYDRAMINE HYDROCHLORIDE 50 MG/ML
50 INJECTION INTRAMUSCULAR; INTRAVENOUS ONCE
Status: CANCELLED
Start: 2019-05-29

## 2019-05-29 RX ORDER — METHYLPREDNISOLONE SODIUM SUCCINATE 125 MG/2ML
125 INJECTION, POWDER, LYOPHILIZED, FOR SOLUTION INTRAMUSCULAR; INTRAVENOUS
Status: CANCELLED
Start: 2019-05-29

## 2019-05-29 RX ORDER — SODIUM CHLORIDE 9 MG/ML
1000 INJECTION, SOLUTION INTRAVENOUS CONTINUOUS PRN
Status: CANCELLED
Start: 2019-05-29

## 2019-05-29 RX ORDER — MEPERIDINE HYDROCHLORIDE 25 MG/ML
25 INJECTION INTRAMUSCULAR; INTRAVENOUS; SUBCUTANEOUS EVERY 30 MIN PRN
Status: CANCELLED | OUTPATIENT
Start: 2019-05-29

## 2019-05-29 RX ORDER — ALBUTEROL SULFATE 0.83 MG/ML
2.5 SOLUTION RESPIRATORY (INHALATION)
Status: CANCELLED | OUTPATIENT
Start: 2019-05-29

## 2019-05-29 RX ORDER — HEPARIN SODIUM (PORCINE) LOCK FLUSH IV SOLN 100 UNIT/ML 100 UNIT/ML
500 SOLUTION INTRAVENOUS EVERY 8 HOURS
Status: DISCONTINUED | OUTPATIENT
Start: 2019-05-29 | End: 2019-05-29 | Stop reason: HOSPADM

## 2019-05-29 RX ORDER — EPINEPHRINE 0.3 MG/.3ML
0.3 INJECTION SUBCUTANEOUS EVERY 5 MIN PRN
Status: CANCELLED | OUTPATIENT
Start: 2019-05-29

## 2019-05-29 RX ADMIN — DIPHENHYDRAMINE HYDROCHLORIDE 50 MG: 50 INJECTION INTRAMUSCULAR; INTRAVENOUS at 14:26

## 2019-05-29 RX ADMIN — LEUPROLIDE ACETATE 22.5 MG: KIT at 14:47

## 2019-05-29 RX ADMIN — HEPARIN 500 UNITS: 100 SYRINGE at 16:21

## 2019-05-29 RX ADMIN — DOCETAXEL 180 MG: 80 INJECTION, SOLUTION, CONCENTRATE INTRAVENOUS at 15:00

## 2019-05-29 RX ADMIN — DEXAMETHASONE SODIUM PHOSPHATE 12 MG: 10 INJECTION, SOLUTION INTRAMUSCULAR; INTRAVENOUS at 14:29

## 2019-05-29 RX ADMIN — HEPARIN 5 ML: 100 SYRINGE at 12:11

## 2019-05-29 RX ADMIN — SODIUM CHLORIDE 250 ML: 9 INJECTION, SOLUTION INTRAVENOUS at 14:27

## 2019-05-29 ASSESSMENT — MIFFLIN-ST. JEOR: SCORE: 1993.95

## 2019-05-29 ASSESSMENT — PAIN SCALES - GENERAL: PAINLEVEL: NO PAIN (0)

## 2019-05-29 NOTE — PROGRESS NOTES
Infusion Nursing Note:  Walter Reyes presents today for Cycle 2 Day 1 Taxotere, Lupron    Patient seen by provider today: Yes: Dr. Smalls   present during visit today: Not Applicable.    Note:    IV Taxotere rate as follows:     999 ml/hr x 14ml  10 ml/hr x 5 minutes   25 ml/hr x 5 minutes   50 ml/hr x 5 minutes   100 ml/hr x 5 minutes   282 ml/hr x 5 minutes     Intravenous Access:  Implanted Port.    Treatment Conditions:  Lab Results   Component Value Date    HGB 12.6 05/29/2019     Lab Results   Component Value Date    WBC 12.3 05/29/2019      Lab Results   Component Value Date    ANEU 10.6 05/29/2019     Lab Results   Component Value Date     05/29/2019      Lab Results   Component Value Date     05/29/2019                   Lab Results   Component Value Date    POTASSIUM 4.0 05/29/2019        Lab Results   Component Value Date    CR 1.04 05/29/2019                   Lab Results   Component Value Date    BETHANY 9.9 05/29/2019                Lab Results   Component Value Date    BILITOTAL 0.4 05/29/2019           Lab Results   Component Value Date    ALBUMIN 3.8 05/29/2019                    Lab Results   Component Value Date    ALT 50 05/29/2019           Lab Results   Component Value Date    AST 28 05/29/2019     Results reviewed, labs MET treatment parameters, ok to proceed with treatment.    Post Infusion Assessment:  Patient tolerated infusion without incident.  Patient tolerated injection without incident.  Blood return noted pre and post infusion.  Site patent and intact, free from redness, edema or discomfort.  No evidence of extravasations.  Access discontinued per protocol.     Discharge Plan:   Prescription refills given for Dexamethasone.  Copy of AVS reviewed with patient and/or family. Patient will return 6/18/19 for next appointment.  Patient discharged in stable condition accompanied by: self and wife.  Departure Mode: Ambulatory.    Xiomara Spicer  RN

## 2019-05-29 NOTE — LETTER
5/29/2019       RE: Walter Reyes  [id number] [facility] [id number]     Dear Colleague,    Thank you for referring your patient, Walter Reyes, to the [facility]. Please see a copy of my visit note below.    MEDICAL ONCOLOGY CLINIC NOTE    REFERRING PROVIDER: Jorge Alberto Yepez MD at [facility].    REASON FOR CURRENT VISIT:  Evaluation before cycle 2 of docetaxel + ADT for metastatic hormone-sensitive prostate cancer.    HISTORY OF PRESENT ILLNESS:  Mr. 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 His oncologic history is detailed below.  His daughter accompanies him for this visit.    Walter started ADT in mid Feb 2019 and underwent a robotic right nephrectomy with Dr. Cleveland on 03/19/19. The surgery was uneventful and he has recovered well. No treatment related side effects from ADT except for hot flashes (grade 1) at this time. He had a grade 1 infusion reaction with cycle 1 of docetaxel, which resolved promptly with benadryl. At home, he had fatigue, nausea, non-specific abdominal pain, gum swelling and tenderness, mucositis, dysgeusia, migratory arthralgia for 4 days after chemo. All symptoms were mild and have resolved. He has chronic right foot numbness for past 2 years, which is unchanged with docetaxel; has noticed a slight increase in chronic left great toe numbness with docetaxel, but it's still mild.     No bone pain, hematuria, SOA/cough/CP etc. Chronic obstructive urinary symptoms unchanged. Wants to continue with docetaxel chemotherapy and has no neuropathy or infection symptoms. No signs/symptoms concerning for disease progression.    ONCOLOGIC HISTORY:  1. Prostate adenocarcinoma, stage IV (M1b at diagnosis), high-volume, castration-sensitive:  - 12/13/2018: PSA found to be elevated to 9.1 ng/mL on a routine follow-up with  "primary care provider Dr. Naylor at [facility]. Prior PSA were 1.4 on 8/16/17, 2.4 on 6/28/16, 2.9 on 6/28/16, and as low as 0.4 on 3/26/2003.   - 1/08/2019: Consultation with Sarah Wilson CNP in Urology clinic. Repeat PSA 9.6.  - 1/16/2019: MRI prostate with contrast - \"This examination is characterized as PIRADS 5- very high probability. Clinically significant cancer is highly likely to be present. There is a large, invasive mass arising from the right peripheral zone and extending into the neurovascular bundle, seminal vesicle, and along the anterolateral right mesorectal fascia. Metastatic right external iliac lymph node. Metastatic lesion in the posterior/superior right acetabulum.\"  - 1/25/2019: CT abdomen and pelvis with IV contrast - \"1. Heterogeneous enhancing mass posteriorly in the upper pole of the right kidney measures 4.5 x 5.8 x 5.7 cm (AP by transverse by craniocaudal). It has a small nodular component extending posterior medially which abuts the right psoas muscle. This nodular extension measures 2.1 x 2.1 cm. Minimal stranding about the mass. No definite thrombus within the right renal vein. This renal mass is compatible with a renal cell carcinoma. A paraaortic lymph node situated immediately posterior to the left renal vein measures 1.1 cm in short axis, suspicious for metastasis. 2. A 2 cm right external iliac lymph node is also suspicious for metastases. 3. Multiple sclerotic osseous lesions suspicious for metastases. These include 0.8 and 0.6 cm sclerotic lesions laterally in the right iliac wing (images 53 and 62 respectively). Ill-defined groundglass density laterally in the right acetabulum measuring 1.7 x 1.2 cm corresponds with the lesion identified on MRI. A 0.4 cm groundglass density in the left acetabulum. Sclerotic lesion in the left femoral neck measures 0.9 x 1.6 cm (image 81). Sclerotic metastases would be more compatible with prostate metastases. 4. A 3 subcentimeter " "hepatic lesions are indeterminate. Metastases would be a consideration. These can be further characterized with liver MRI.\"  - 1/25/2019: NM bone scan - \"There is focal bony uptake in the left femoral neck, right acetabulum, right of midline at the S1 or L5 level of the spine, within multiple bilateral ribs, in the C7 or T1 level of the spine, and anteriorly within the skull. Findings are suspicious for metastases.\"  - 1/31/19: CT chest with contrast - \" No suspicious nodules in the chest.  Stable appearance of a 5.8 cm right renal mass concerning for renal carcinoma until proven otherwise.  Stable indeterminant subcentimeter hypodensities in the liver.  Multifocal osteoblastic metastasis including 2.5 cm lesion in the right fifth rib posteriorly and a 1.6 cm lesion in the right third rib posteriorly. No lytic lesions identified.\"  - 2/6/19: CT-guided right sclerotic fifth rib lesion biopsy - \"Metastatic carcinoma, consistent with prostate primary.  Immunohistochemical stains performed show the metastatic carcinoma stains positive for NKX3.1 (prostate marker), negative for STACIE 3 and PAX8, which supports the above diagnosis.\"  - 2/15/19: Started Casodex 50mg every day and consented for the biobanking protocol. 3/18/19 - stopped Casodex.  - 2/19/19: Case discussed in tumor board - recommendation for nephectomy for suspected malignant right renal mass.   - 02/26/19: Started Lupron 22.5mg every 3 months. 05/29/19 - cycle 2.   - 5/8/19: Started Docetaxel 75mg/m2 IV every 3 weeks. 05/29/19 - cycle 2.     2. Stage III ([id number]), grade 3 of 4, clear-cell RCC of right kidney:  - Incidentally diagnosed as above.   - Underwent curative-intent robotic right radical nephrectomy with Dr. Wesley Cleveland on 3/19/19. No tumor spillage per op report.   - Path showed: \"Histologic Type: Clear cell renal cell carcinoma; Sarcomatoid Features: Not identified; Histologic Grade: Nucleolar grade 3 (WHO/ISUP). Extent Tumor Size: 4.3 cm. " "Microscopic Tumor Extension: Tumor extension into renal sinus (in vascular structures). Margins: Negative. Tumor Necrosis: Present; focal. Lymph-Vascular Invasion: Not identified.  Pathologic Staging (pTNM) Primary Tumor (pT): pT3a: Tumor extends into renal vein branches/renal sinus. Regional Lymph Nodes (pN): pNX. Number of Lymph Nodes Examined: 0 Distant Metastasis (pM): pM N/A.\"    REVIEW OF SYSTEMS: 14 point ROS negative other than the symptoms noted above in the HPI.    PAST MEDICAL HISTORY:  1. Prostate cancer.  2. Hypothyroidism.  3. Allergic rhinitis.  4. Esophageal reflux disorder.  5. Dyslipidemia.  6. Glucose intolerance.  7. Obesity.     PAST SURGICAL HISTORY:   As above.    SOCIAL HISTORY:   He is  and lives with his wife, Micheline in [place]. He has 4 children and 3 sisters (2 elder brothers  from heart failure). Denies tobacco, alcohol or illicit drug use. 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    FAMILY HISTORY:   He does have a remarkable family history with several maternal relatives affected with cancer.  He has 5 siblings including one sister with skin cancer but no other malignancies.  2 of his brothers passed away at age 68 each because of smoking alcohol use and illicit drug use related heart failure.  Several maternal aunts with lymphoma, colon, liver, breast, and lung cancer, as well as a maternal uncle with lung cancer. His paternal uncle had stomach cancer.     ALLERGIES:   Allergies   Allergen Reactions     Doxycycline GI Disturbance     CURRENT MEDICATIONS:   Current Outpatient Medications:      amLODIPine (NORVASC) 10 MG tablet, Take 1 tablet (10 mg) by mouth daily, Disp: 30 tablet, Rfl: 0     atorvastatin (LIPITOR) 20 MG tablet, Take 20 mg by mouth daily, Disp: , Rfl:      calcium citrate-vitamin D (CITRACAL) 315-250 MG-UNIT TABS per tablet, Take 650 mg by mouth 2 times daily , Disp: , Rfl:      cinnamon 500 MG CAPS, Take 500 mg " "by mouth daily , Disp: , Rfl:      cycloSPORINE (RESTASIS) 0.05 % ophthalmic emulsion, Place 1 drop into both eyes 2 times daily , Disp: , Rfl:      dexamethasone (DECADRON) 4 MG tablet, Take 8 mg by mouth 2 times daily Evening PRIOR and continue morning of Docetaxel, then 4 additional doses.., Disp: 12 tablet, Rfl: 5     fluticasone (FLONASE) 50 MCG/ACT nasal spray, Spray 2 sprays in nostril daily as needed , Disp: , Rfl:      Glucosamine-Chondroit-Vit C-Mn (GLUCOSAMINE 1500 COMPLEX) CAPS, Take 1 capsule by mouth daily, Disp: , Rfl:      levothyroxine (SYNTHROID/LEVOTHROID) 125 MCG tablet, Take 125 mcg by mouth daily, Disp: , Rfl:      Multiple Vitamins-Minerals (MULTIVITAMIN ADULT EXTRA C PO), Take 1 tablet by mouth every 24 hours, Disp: , Rfl:      Nutritional Supplements (SALMON OIL) CAPS, Take 1 capsule by mouth daily, Disp: , Rfl:      omeprazole (PRILOSEC) 20 MG DR capsule, Take 20 mg by mouth daily, Disp: , Rfl:      prochlorperazine (COMPAZINE) 10 MG tablet, Take 1 tablet (10 mg) by mouth every 6 hours as needed (Nausea/Vomiting), Disp: 30 tablet, Rfl: 5     cetirizine (ZYRTEC) 10 MG tablet, Take 10 mg by mouth as needed, Disp: , Rfl:   No current facility-administered medications for this visit.     PHYSICAL EXAMINATION:  Vital signs: /78 (BP Location: Right arm, Patient Position: Chair, Cuff Size: Adult Regular)   Pulse 86   Temp 98.5  F (36.9  C) (Oral)   Resp 16   Ht 1.829 m (6' 0.01\")   Wt 113.1 kg (249 lb 4.8 oz)   SpO2 100%   BMI 33.80 kg/m     ECOG performance status of 0. Fatigue mild.  GENERAL: Well-nourished healthy-appearing man in chair, no acute distress.   HEENT: No icterus, no pallor. Moist mucous membranes. Oropharynx is clear. No mucosal abnormalities.  NECK: Supple, no JVD/LAD.  LUNGS: Clear to ausculation bilaterally, normal work of breathing.   CARDIOVASCULAR: Regular rate and rhythm, no murmurs, gallops or rubs.   ABDOMEN: Soft, nontender and nondistended, no palpable " "masses, bowel sounds present.  EXTREMITIES: No cyanosis, no clubbing, no edema.   NEUROLOGIC: No focal deficits, CN 2-12 intact.  LYMPH NODE EXAM: No palpable adenopathy - cervical, axillary or inguinal.   SKIN: Rash has resolved.    LABORATORY DATA:   Lab Test 05/29/19  1217 05/08/19  1439 04/30/19  1627   WBC 12.3* 15.3* 6.1   RBC 4.28* 4.27* 4.65   HGB 12.6* 12.5* 13.4   HCT 36.7* 35.7* 39.7*   MCV 86 84 85   MCH 29.4 29.3 28.8   MCHC 34.3 35.0 33.8   RDW 14.4 13.4 13.3    274 248   NEUTROPHIL 86.1 90.2 49.7    138 138   POTASSIUM 4.0 4.0 3.8   CHLORIDE 105 107 106   CO2 25 20 24   ANIONGAP 8 11 8   * 135* 96   BUN 17 28 28   CR 1.04 1.16 1.33*   BETHANY 9.9 9.2 9.2   PROTTOTAL 7.8 7.7 8.0   ALBUMIN 3.8 3.9 3.9   BILITOTAL 0.4 0.4 0.2   ALKPHOS 67 71 82   AST 28 24 30   ALT 50 50 58     Lab Test 05/29/19  1217 04/30/19  1627 03/26/19  1604 02/26/19  1425   PSA 1.18 0.69 1.25 8.00*   CGAB  -- P 1,309* 900* 654*   TESTOSTTOTAL  -- P 7* 22* 462   CGAB - Chromogranin A; TESTOSTTOTAL: Total testosterone.    Lab Test 05/29/19  1217   *     IMAGING STUDIES:  As above.    ASSESSMENT AND PLAN: Mr. Reyes is a delightful 59-year-old gentleman with newly diagnosed metastatic castration sensitive prostate adenocarcinoma as well as a stage III clear-cell renal cell carcinoma of right kidney, who is here for a follow-up visit.     Metastatic prostate cancer:   - Patient meets the criteria for CHAARTED \"high-volume\" metastatic hormone-sensitive prostate cancer.  - I do not have a clinical trial for this gentleman at this time, especially given the suspicion for a concurrent malignancy.  He has enrolled in the institutional biobanking study on 2/15/19 but this is a non-interventional study.   - After a review of systemic therapy options, patient has opted to start docetaxel in combination with ADT (per JOSEFA, [id number], KARLA).   - Reviewed restaging scan results that show likely early " response to ADT.  - Plan to continue with cycle 2 docetaxel today - regimen will be docetaxel 75mg/m2 IV every 3 weeks for up to 6 cycles. No primary prophy G-CSF.  - Patient developed a grade 1 allergic reaction with cycle 1. Dose interrupted and given solumedrol and benadryl. RN successfully able to reinitiate docetaxel and patient tolerated remainder of infusion well. Will add dex and benadryl premeds for all subsequent cycles.  - Risks/benefits of docetaxel including allergic reaction, fatigue, nausea/vomiting/constipation, myelosuppression, neuropathy, LFT changes etc discussed in detail.   - Encouraged to proactively use salt-soda rinses to prevent and treat mucositis for all subsequent cycles.  - Continue Lupron 22.5mg every 3 monthly - next dose today.  - He understands the risks and benefits of ADT including hot flashes, mood changes and long-term cardiovascular, bone health, etc. Also understands the risks of docetaxel treatment including fatigue, nausea, vomiting, diarrhea/constipation, hand-foot syndrome, allergic reactions, myelosuppression with increased risk of infection and bleeding etc.  - Will repeat restaging scans after 3 cycles.   - Will also add antiresorptive therapy based on restaging results after chemo.     Stage 3, UISS-high risk ccRCC:  - Patient understands that he has a stage III, UISS high risk clear-cell renal cell carcinoma with approximately 40-50% risk of recurrence after definitive surgery.   - At this time, he has no clear evidence of residual disease.  The retroperitoneal lymphadenopathy is more consistent with metastatic prostate cancer.  - He understands the role of adjuvant therapies in RCC and that at best, the data for adjuvant TKI therapy is mixed and shows an improvement in disease-free survival and not overall survival.   - Continue active surveillance with self-reporting for signs/symptoms of recurrence (explained in detail) and periodic H&P and scans.     Genetics  referral:  - Has been seen by Genetics clinic on 3/7/19. No evidence of inherited cancer syndromes found on workup.     Research participation:  - Enrolled in  biobanking study.    Return to see me in 3 weeks.     Patient and family were very appreciative of the care provided and in complete agreement this plan.  All questions were answered.    BILLIN.    Uday Smalls M.D.  . Professor of Medicine  Genitourinary Oncology  Division of Hematology, Oncology & Transplantation  [facility]

## 2019-05-29 NOTE — PROGRESS NOTES
MEDICAL ONCOLOGY CLINIC NOTE    REFERRING PROVIDER: Jorge Alberto Yepez MD at [facility].    REASON FOR CURRENT VISIT:  Evaluation before cycle 2 of docetaxel + ADT for metastatic hormone-sensitive prostate cancer.    HISTORY OF PRESENT ILLNESS:  Mr. 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 His oncologic history is detailed below.  His daughter accompanies him for this visit.    Walter started ADT in mid Feb 2019 and underwent a robotic right nephrectomy with Dr. Cleveland on 03/19/19. The surgery was uneventful and he has recovered well. No treatment related side effects from ADT except for hot flashes (grade 1) at this time. He had a grade 1 infusion reaction with cycle 1 of docetaxel, which resolved promptly with benadryl. At home, he had fatigue, nausea, non-specific abdominal pain, gum swelling and tenderness, mucositis, dysgeusia, migratory arthralgia for 4 days after chemo. All symptoms were mild and have resolved. He has chronic right foot numbness for past 2 years, which is unchanged with docetaxel; has noticed a slight increase in chronic left great toe numbness with docetaxel, but it's still mild.     No bone pain, hematuria, SOA/cough/CP etc. Chronic obstructive urinary symptoms unchanged. Wants to continue with docetaxel chemotherapy and has no neuropathy or infection symptoms. No signs/symptoms concerning for disease progression.    ONCOLOGIC HISTORY:  1. Prostate adenocarcinoma, stage IV (M1b at diagnosis), high-volume, castration-sensitive:  - 12/13/2018: PSA found to be elevated to 9.1 ng/mL on a routine follow-up with primary care provider Dr. Naylor at [facility]. Prior PSA were 1.4 on 8/16/17, 2.4 on 6/28/16, 2.9 on 6/28/16, and as low as 0.4 on 3/26/2003.   - 1/08/2019: Consultation with Sarah Wilson CNP in Urology clinic. Repeat PSA 9.6.  - 1/16/2019: MRI prostate  "with contrast - \"This examination is characterized as PIRADS 5- very high probability. Clinically significant cancer is highly likely to be present. There is a large, invasive mass arising from the right peripheral zone and extending into the neurovascular bundle, seminal vesicle, and along the anterolateral right mesorectal fascia. Metastatic right external iliac lymph node. Metastatic lesion in the posterior/superior right acetabulum.\"  - 1/25/2019: CT abdomen and pelvis with IV contrast - \"1. Heterogeneous enhancing mass posteriorly in the upper pole of the right kidney measures 4.5 x 5.8 x 5.7 cm (AP by transverse by craniocaudal). It has a small nodular component extending posterior medially which abuts the right psoas muscle. This nodular extension measures 2.1 x 2.1 cm. Minimal stranding about the mass. No definite thrombus within the right renal vein. This renal mass is compatible with a renal cell carcinoma. A paraaortic lymph node situated immediately posterior to the left renal vein measures 1.1 cm in short axis, suspicious for metastasis. 2. A 2 cm right external iliac lymph node is also suspicious for metastases. 3. Multiple sclerotic osseous lesions suspicious for metastases. These include 0.8 and 0.6 cm sclerotic lesions laterally in the right iliac wing (images 53 and 62 respectively). Ill-defined groundglass density laterally in the right acetabulum measuring 1.7 x 1.2 cm corresponds with the lesion identified on MRI. A 0.4 cm groundglass density in the left acetabulum. Sclerotic lesion in the left femoral neck measures 0.9 x 1.6 cm (image 81). Sclerotic metastases would be more compatible with prostate metastases. 4. A 3 subcentimeter hepatic lesions are indeterminate. Metastases would be a consideration. These can be further characterized with liver MRI.\"  - 1/25/2019: NM bone scan - \"There is focal bony uptake in the left femoral neck, right acetabulum, right of midline at the S1 or L5 level of " "the spine, within multiple bilateral ribs, in the C7 or T1 level of the spine, and anteriorly within the skull. Findings are suspicious for metastases.\"  - 1/31/19: CT chest with contrast - \" No suspicious nodules in the chest.  Stable appearance of a 5.8 cm right renal mass concerning for renal carcinoma until proven otherwise.  Stable indeterminant subcentimeter hypodensities in the liver.  Multifocal osteoblastic metastasis including 2.5 cm lesion in the right fifth rib posteriorly and a 1.6 cm lesion in the right third rib posteriorly. No lytic lesions identified.\"  - 2/6/19: CT-guided right sclerotic fifth rib lesion biopsy - \"Metastatic carcinoma, consistent with prostate primary.  Immunohistochemical stains performed show the metastatic carcinoma stains positive for NKX3.1 (prostate marker), negative for STACIE 3 and PAX8, which supports the above diagnosis.\"  - 2/15/19: Started Casodex 50mg every day and consented for the biobanking protocol. 3/18/19 - stopped Casodex.  - 2/19/19: Case discussed in tumor board - recommendation for nephectomy for suspected malignant right renal mass.   - 02/26/19: Started Lupron 22.5mg every 3 months. 05/29/19 - cycle 2.   - 5/8/19: Started Docetaxel 75mg/m2 IV every 3 weeks. 05/29/19 - cycle 2.     2. Stage III ([id number]), grade 3 of 4, clear-cell RCC of right kidney:  - Incidentally diagnosed as above.   - Underwent curative-intent robotic right radical nephrectomy with Dr. Wesley Cleveland on 3/19/19. No tumor spillage per op report.   - Path showed: \"Histologic Type: Clear cell renal cell carcinoma; Sarcomatoid Features: Not identified; Histologic Grade: Nucleolar grade 3 (WHO/ISUP). Extent Tumor Size: 4.3 cm. Microscopic Tumor Extension: Tumor extension into renal sinus (in vascular structures). Margins: Negative. Tumor Necrosis: Present; focal. Lymph-Vascular Invasion: Not identified.  Pathologic Staging (pTNM) Primary Tumor (pT): pT3a: Tumor extends into renal vein " "branches/renal sinus. Regional Lymph Nodes (pN): pNX. Number of Lymph Nodes Examined: 0 Distant Metastasis (pM): pM N/A.\"    REVIEW OF SYSTEMS: 14 point ROS negative other than the symptoms noted above in the HPI.    PAST MEDICAL HISTORY:  1. Prostate cancer.  2. Hypothyroidism.  3. Allergic rhinitis.  4. Esophageal reflux disorder.  5. Dyslipidemia.  6. Glucose intolerance.  7. Obesity.     PAST SURGICAL HISTORY:   As above.    SOCIAL HISTORY:   He is  and lives with his wife, Micheline in [place]. He has 4 children and 3 sisters (2 elder brothers  from heart failure). Denies tobacco, alcohol or illicit drug use. 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    FAMILY HISTORY:   He does have a remarkable family history with several maternal relatives affected with cancer.  He has 5 siblings including one sister with skin cancer but no other malignancies.  2 of his brothers passed away at age 68 each because of smoking alcohol use and illicit drug use related heart failure.  Several maternal aunts with lymphoma, colon, liver, breast, and lung cancer, as well as a maternal uncle with lung cancer. His paternal uncle had stomach cancer.     ALLERGIES:   Allergies   Allergen Reactions     Doxycycline GI Disturbance     CURRENT MEDICATIONS:   Current Outpatient Medications:      amLODIPine (NORVASC) 10 MG tablet, Take 1 tablet (10 mg) by mouth daily, Disp: 30 tablet, Rfl: 0     atorvastatin (LIPITOR) 20 MG tablet, Take 20 mg by mouth daily, Disp: , Rfl:      calcium citrate-vitamin D (CITRACAL) 315-250 MG-UNIT TABS per tablet, Take 650 mg by mouth 2 times daily , Disp: , Rfl:      cinnamon 500 MG CAPS, Take 500 mg by mouth daily , Disp: , Rfl:      cycloSPORINE (RESTASIS) 0.05 % ophthalmic emulsion, Place 1 drop into both eyes 2 times daily , Disp: , Rfl:      dexamethasone (DECADRON) 4 MG tablet, Take 8 mg by mouth 2 times daily Evening PRIOR and continue morning of " "Docetaxel, then 4 additional doses.., Disp: 12 tablet, Rfl: 5     fluticasone (FLONASE) 50 MCG/ACT nasal spray, Spray 2 sprays in nostril daily as needed , Disp: , Rfl:      Glucosamine-Chondroit-Vit C-Mn (GLUCOSAMINE 1500 COMPLEX) CAPS, Take 1 capsule by mouth daily, Disp: , Rfl:      levothyroxine (SYNTHROID/LEVOTHROID) 125 MCG tablet, Take 125 mcg by mouth daily, Disp: , Rfl:      Multiple Vitamins-Minerals (MULTIVITAMIN ADULT EXTRA C PO), Take 1 tablet by mouth every 24 hours, Disp: , Rfl:      Nutritional Supplements (SALMON OIL) CAPS, Take 1 capsule by mouth daily, Disp: , Rfl:      omeprazole (PRILOSEC) 20 MG DR capsule, Take 20 mg by mouth daily, Disp: , Rfl:      prochlorperazine (COMPAZINE) 10 MG tablet, Take 1 tablet (10 mg) by mouth every 6 hours as needed (Nausea/Vomiting), Disp: 30 tablet, Rfl: 5     cetirizine (ZYRTEC) 10 MG tablet, Take 10 mg by mouth as needed, Disp: , Rfl:   No current facility-administered medications for this visit.     PHYSICAL EXAMINATION:  Vital signs: /78 (BP Location: Right arm, Patient Position: Chair, Cuff Size: Adult Regular)   Pulse 86   Temp 98.5  F (36.9  C) (Oral)   Resp 16   Ht 1.829 m (6' 0.01\")   Wt 113.1 kg (249 lb 4.8 oz)   SpO2 100%   BMI 33.80 kg/m    ECOG performance status of 0. Fatigue mild.  GENERAL: Well-nourished healthy-appearing man in chair, no acute distress.   HEENT: No icterus, no pallor. Moist mucous membranes. Oropharynx is clear. No mucosal abnormalities.  NECK: Supple, no JVD/LAD.  LUNGS: Clear to ausculation bilaterally, normal work of breathing.   CARDIOVASCULAR: Regular rate and rhythm, no murmurs, gallops or rubs.   ABDOMEN: Soft, nontender and nondistended, no palpable masses, bowel sounds present.  EXTREMITIES: No cyanosis, no clubbing, no edema.   NEUROLOGIC: No focal deficits, CN 2-12 intact.  LYMPH NODE EXAM: No palpable adenopathy - cervical, axillary or inguinal.   SKIN: Rash has resolved.    LABORATORY DATA:   Lab Test " "05/29/19  1217 05/08/19  1439 04/30/19  1627   WBC 12.3* 15.3* 6.1   RBC 4.28* 4.27* 4.65   HGB 12.6* 12.5* 13.4   HCT 36.7* 35.7* 39.7*   MCV 86 84 85   MCH 29.4 29.3 28.8   MCHC 34.3 35.0 33.8   RDW 14.4 13.4 13.3    274 248   NEUTROPHIL 86.1 90.2 49.7    138 138   POTASSIUM 4.0 4.0 3.8   CHLORIDE 105 107 106   CO2 25 20 24   ANIONGAP 8 11 8   * 135* 96   BUN 17 28 28   CR 1.04 1.16 1.33*   BETHANY 9.9 9.2 9.2   PROTTOTAL 7.8 7.7 8.0   ALBUMIN 3.8 3.9 3.9   BILITOTAL 0.4 0.4 0.2   ALKPHOS 67 71 82   AST 28 24 30   ALT 50 50 58     Lab Test 05/29/19  1217 04/30/19  1627 03/26/19  1604 02/26/19  1425   PSA 1.18 0.69 1.25 8.00*   CGAB  -- P 1,309* 900* 654*   TESTOSTTOTAL  -- P 7* 22* 462   CGAB - Chromogranin A; TESTOSTTOTAL: Total testosterone.    Lab Test 05/29/19  1217   *     IMAGING STUDIES:  As above.    ASSESSMENT AND PLAN: Mr. Reyes is a delightful 59-year-old gentleman with newly diagnosed metastatic castration sensitive prostate adenocarcinoma as well as a stage III clear-cell renal cell carcinoma of right kidney, who is here for a follow-up visit.     Metastatic prostate cancer:   - Patient meets the criteria for JOSEFA \"high-volume\" metastatic hormone-sensitive prostate cancer.  - I do not have a clinical trial for this gentleman at this time, especially given the suspicion for a concurrent malignancy.  He has enrolled in the institutional biobanking study on 2/15/19 but this is a non-interventional study.   - After a review of systemic therapy options, patient has opted to start docetaxel in combination with ADT (per JOSEFA, [id number], FELIPEEDSEEMA).   - Reviewed restaging scan results that show likely early response to ADT.  - Plan to continue with cycle 2 docetaxel today - regimen will be docetaxel 75mg/m2 IV every 3 weeks for up to 6 cycles. No primary prophy G-CSF.  - Patient developed a grade 1 allergic reaction with cycle 1. Dose interrupted and given solumedrol " and benadryl. RN successfully able to reinitiate docetaxel and patient tolerated remainder of infusion well. Will add dex and benadryl premeds for all subsequent cycles.  - Risks/benefits of docetaxel including allergic reaction, fatigue, nausea/vomiting/constipation, myelosuppression, neuropathy, LFT changes etc discussed in detail.   - Encouraged to proactively use salt-soda rinses to prevent and treat mucositis for all subsequent cycles.  - Continue Lupron 22.5mg every 3 monthly - next dose today.  - He understands the risks and benefits of ADT including hot flashes, mood changes and long-term cardiovascular, bone health, etc. Also understands the risks of docetaxel treatment including fatigue, nausea, vomiting, diarrhea/constipation, hand-foot syndrome, allergic reactions, myelosuppression with increased risk of infection and bleeding etc.  - Will repeat restaging scans after 3 cycles.   - Will also add antiresorptive therapy based on restaging results after chemo.     Stage 3, UISS-high risk ccRCC:  - Patient understands that he has a stage III, UISS high risk clear-cell renal cell carcinoma with approximately 40-50% risk of recurrence after definitive surgery.   - At this time, he has no clear evidence of residual disease.  The retroperitoneal lymphadenopathy is more consistent with metastatic prostate cancer.  - He understands the role of adjuvant therapies in RCC and that at best, the data for adjuvant TKI therapy is mixed and shows an improvement in disease-free survival and not overall survival.   - Continue active surveillance with self-reporting for signs/symptoms of recurrence (explained in detail) and periodic H&P and scans.     Genetics referral:  - Has been seen by Genetics clinic on 3/7/19. No evidence of inherited cancer syndromes found on workup.     Research participation:  - Enrolled in  biobanking study.    Return to see me in 3 weeks.     Patient and family were very appreciative of the care  provided and in complete agreement this plan.  All questions were answered.    BILLIN.    Uday Smalls M.D.  . Professor of Medicine  Genitourinary Oncology  Division of Hematology, Oncology & Transplantation  [facility]

## 2019-05-29 NOTE — NURSING NOTE
Chief Complaint   Patient presents with     Port Draw     Labs drawn via port by RN in lab. VS taken. Pt checked in for next appt     Port accessed with 20g gripper needle by RN, labs collected, line flushed with saline and heparin.  Vitals taken. Pt checked in for appointment(s).    Gloria SEAY RN PHN BSN  BMT/Oncology Lab

## 2019-05-29 NOTE — NURSING NOTE
"Oncology Rooming Note    May 29, 2019 1:09 PM   Walter Reyes is a 59 year old male who presents for:    Chief Complaint   Patient presents with     Port Draw     Labs drawn via port by RN in lab. VS taken. Pt checked in for next appt     Oncology Clinic Visit     3 Week Follow Up Visit for Prostate Cancer     Initial Vitals: BP (!) 151/100 (BP Location: Right arm, Patient Position: Sitting, Cuff Size: Adult Large)   Pulse 86   Temp 98.5  F (36.9  C) (Oral)   Resp 16   Ht 1.829 m (6' 0.01\")   Wt 113.1 kg (249 lb 4.8 oz)   SpO2 100%   BMI 33.80 kg/m   Estimated body mass index is 33.8 kg/m  as calculated from the following:    Height as of this encounter: 1.829 m (6' 0.01\").    Weight as of this encounter: 113.1 kg (249 lb 4.8 oz). Body surface area is 2.4 meters squared.  No Pain (0) Comment: Data Unavailable   No LMP for male patient.  Allergies reviewed: Yes  Medications reviewed: Yes    Medications: Medication refills not needed today.  Pharmacy name entered into EPIC: [facility] [place], [place] - [address] [address]    Clinical concerns: Patient is here with his wife. He is doing well & thinks his first session went very well.  The side effects he did experience he was prepared for & handled them well.  Today he begins his second session of chemo.  He did have a reaction during the beginning of his first chemo session so he may jignesh some benadryl today as he begins his next course.  The only question they have is whether or not the side effects of chemo are cummulative or get better or worse with each course.   Slim was notified.      Franca Curiel RN              "

## 2019-05-29 NOTE — PATIENT INSTRUCTIONS
Contact Numbers    [facility] Main Line: [phone]  [facility] Triage and after hours / weekends / holidays:  [phone]      Please call the triage or after hours line if you experience a temperature greater than or equal to 100.5, shaking chills, have uncontrolled nausea, vomiting and/or diarrhea, dizziness, shortness of breath, chest pain, bleeding, unexplained bruising, or if you have any other new/concerning symptoms, questions or concerns.      If you are having any concerning symptoms or wish to speak to a provider before your next infusion visit, please call your care coordinator or triage to notify them so we can adequately serve you.     If you need a refill on a narcotic prescription or other medication, please call before your infusion appointment.     Recent Results (from the past 24 hour(s))   CBC with platelets differential    Collection Time: 05/29/19 12:17 PM   Result Value Ref Range    WBC 12.3 (H) 4.0 - 11.0 10e9/L    RBC Count 4.28 (L) 4.4 - 5.9 10e12/L    Hemoglobin 12.6 (L) 13.3 - 17.7 g/dL    Hematocrit 36.7 (L) 40.0 - 53.0 %    MCV 86 78 - 100 fl    MCH 29.4 26.5 - 33.0 pg    MCHC 34.3 31.5 - 36.5 g/dL    RDW 14.4 10.0 - 15.0 %    Platelet Count 215 150 - 450 10e9/L    Diff Method Automated Method     % Neutrophils 86.1 %    % Lymphocytes 9.6 %    % Monocytes 2.9 %    % Eosinophils 0.1 %    % Basophils 0.2 %    % Immature Granulocytes 1.1 %    Nucleated RBCs 0 0 /100    Absolute Neutrophil 10.6 (H) 1.6 - 8.3 10e9/L    Absolute Lymphocytes 1.2 0.8 - 5.3 10e9/L    Absolute Monocytes 0.4 0.0 - 1.3 10e9/L    Absolute Eosinophils 0.0 0.0 - 0.7 10e9/L    Absolute Basophils 0.0 0.0 - 0.2 10e9/L    Abs Immature Granulocytes 0.1 0 - 0.4 10e9/L    Absolute Nucleated RBC 0.0    PSA tumor marker    Collection Time: 05/29/19 12:17 PM   Result Value Ref Range    PSA 1.18 0 - 4 ug/L   Comprehensive metabolic panel    Collection Time: 05/29/19 12:17 PM   Result Value Ref Range    Sodium 139 133 - 144 mmol/L     Potassium 4.0 3.4 - 5.3 mmol/L    Chloride 105 94 - 109 mmol/L    Carbon Dioxide 25 20 - 32 mmol/L    Anion Gap 8 3 - 14 mmol/L    Glucose 123 (H) 70 - 99 mg/dL    Urea Nitrogen 17 7 - 30 mg/dL    Creatinine 1.04 0.66 - 1.25 mg/dL    GFR Estimate 79 >60 mL/min/[1.73_m2]    GFR Estimate If Black >90 >60 mL/min/[1.73_m2]    Calcium 9.9 8.5 - 10.1 mg/dL    Bilirubin Total 0.4 0.2 - 1.3 mg/dL    Albumin 3.8 3.4 - 5.0 g/dL    Protein Total 7.8 6.8 - 8.8 g/dL    Alkaline Phosphatase 67 40 - 150 U/L    ALT 50 0 - 70 U/L    AST 28 0 - 45 U/L   Lactate Dehydrogenase    Collection Time: 05/29/19 12:17 PM   Result Value Ref Range    Lactate Dehydrogenase 232 (H) 85 - 227 U/L

## 2019-05-31 LAB
CGA SERPL-MCNC: 659 NG/ML (ref 0–95)
TESTOST SERPL-MCNC: <2 NG/DL (ref 240–950)

## 2019-06-18 ENCOUNTER — INFUSION THERAPY VISIT (OUTPATIENT)
Dept: ONCOLOGY | Facility: CLINIC | Age: 59
End: 2019-06-18
Attending: INTERNAL MEDICINE
Payer: COMMERCIAL

## 2019-06-18 ENCOUNTER — APPOINTMENT (OUTPATIENT)
Dept: LAB | Facility: CLINIC | Age: 59
End: 2019-06-18
Attending: INTERNAL MEDICINE
Payer: COMMERCIAL

## 2019-06-18 VITALS
RESPIRATION RATE: 16 BRPM | DIASTOLIC BLOOD PRESSURE: 78 MMHG | BODY MASS INDEX: 33.7 KG/M2 | TEMPERATURE: 98.1 F | WEIGHT: 248.8 LBS | SYSTOLIC BLOOD PRESSURE: 138 MMHG | OXYGEN SATURATION: 98 % | HEIGHT: 72 IN | HEART RATE: 91 BPM

## 2019-06-18 DIAGNOSIS — C79.51 MALIGNANT NEOPLASM OF PROSTATE METASTATIC TO BONE (H): Primary | ICD-10-CM

## 2019-06-18 DIAGNOSIS — C61 MALIGNANT NEOPLASM OF PROSTATE METASTATIC TO BONE (H): Primary | ICD-10-CM

## 2019-06-18 DIAGNOSIS — C64.1 RENAL CELL CARCINOMA, RIGHT (H): ICD-10-CM

## 2019-06-18 LAB
ALBUMIN SERPL-MCNC: 3.7 G/DL (ref 3.4–5)
ALP SERPL-CCNC: 62 U/L (ref 40–150)
ALT SERPL W P-5'-P-CCNC: 30 U/L (ref 0–70)
ANION GAP SERPL CALCULATED.3IONS-SCNC: 10 MMOL/L (ref 3–14)
AST SERPL W P-5'-P-CCNC: 20 U/L (ref 0–45)
BASOPHILS # BLD AUTO: 0 10E9/L (ref 0–0.2)
BASOPHILS NFR BLD AUTO: 0.1 %
BILIRUB SERPL-MCNC: 0.5 MG/DL (ref 0.2–1.3)
BUN SERPL-MCNC: 21 MG/DL (ref 7–30)
CALCIUM SERPL-MCNC: 9.3 MG/DL (ref 8.5–10.1)
CHLORIDE SERPL-SCNC: 107 MMOL/L (ref 94–109)
CO2 SERPL-SCNC: 24 MMOL/L (ref 20–32)
CREAT SERPL-MCNC: 1.04 MG/DL (ref 0.66–1.25)
DIFFERENTIAL METHOD BLD: ABNORMAL
EOSINOPHIL # BLD AUTO: 0 10E9/L (ref 0–0.7)
EOSINOPHIL NFR BLD AUTO: 0.1 %
ERYTHROCYTE [DISTWIDTH] IN BLOOD BY AUTOMATED COUNT: 15.4 % (ref 10–15)
GFR SERPL CREATININE-BSD FRML MDRD: 79 ML/MIN/{1.73_M2}
GLUCOSE SERPL-MCNC: 136 MG/DL (ref 70–99)
HCT VFR BLD AUTO: 36.7 % (ref 40–53)
HGB BLD-MCNC: 12.4 G/DL (ref 13.3–17.7)
IMM GRANULOCYTES # BLD: 0.1 10E9/L (ref 0–0.4)
IMM GRANULOCYTES NFR BLD: 0.7 %
LDH SERPL L TO P-CCNC: 220 U/L (ref 85–227)
LYMPHOCYTES # BLD AUTO: 1 10E9/L (ref 0.8–5.3)
LYMPHOCYTES NFR BLD AUTO: 8.1 %
MCH RBC QN AUTO: 30 PG (ref 26.5–33)
MCHC RBC AUTO-ENTMCNC: 33.8 G/DL (ref 31.5–36.5)
MCV RBC AUTO: 89 FL (ref 78–100)
MONOCYTES # BLD AUTO: 0.2 10E9/L (ref 0–1.3)
MONOCYTES NFR BLD AUTO: 1.7 %
NEUTROPHILS # BLD AUTO: 10.6 10E9/L (ref 1.6–8.3)
NEUTROPHILS NFR BLD AUTO: 89.3 %
NRBC # BLD AUTO: 0 10*3/UL
NRBC BLD AUTO-RTO: 0 /100
PLATELET # BLD AUTO: 233 10E9/L (ref 150–450)
POTASSIUM SERPL-SCNC: 3.8 MMOL/L (ref 3.4–5.3)
PROT SERPL-MCNC: 7.6 G/DL (ref 6.8–8.8)
PSA SERPL-MCNC: 0.8 UG/L (ref 0–4)
RBC # BLD AUTO: 4.14 10E12/L (ref 4.4–5.9)
SODIUM SERPL-SCNC: 140 MMOL/L (ref 133–144)
WBC # BLD AUTO: 11.8 10E9/L (ref 4–11)

## 2019-06-18 PROCEDURE — G0463 HOSPITAL OUTPT CLINIC VISIT: HCPCS | Mod: ZF

## 2019-06-18 PROCEDURE — 25000128 H RX IP 250 OP 636: Mod: ZF | Performed by: PHYSICIAN ASSISTANT

## 2019-06-18 PROCEDURE — 86316 IMMUNOASSAY TUMOR OTHER: CPT | Performed by: INTERNAL MEDICINE

## 2019-06-18 PROCEDURE — 85025 COMPLETE CBC W/AUTO DIFF WBC: CPT | Performed by: INTERNAL MEDICINE

## 2019-06-18 PROCEDURE — 96375 TX/PRO/DX INJ NEW DRUG ADDON: CPT

## 2019-06-18 PROCEDURE — 25800030 ZZH RX IP 258 OP 636: Mod: ZF | Performed by: PHYSICIAN ASSISTANT

## 2019-06-18 PROCEDURE — 99214 OFFICE O/P EST MOD 30 MIN: CPT | Mod: ZP | Performed by: PHYSICIAN ASSISTANT

## 2019-06-18 PROCEDURE — 96413 CHEMO IV INFUSION 1 HR: CPT

## 2019-06-18 PROCEDURE — 83615 LACTATE (LD) (LDH) ENZYME: CPT | Performed by: INTERNAL MEDICINE

## 2019-06-18 PROCEDURE — 80053 COMPREHEN METABOLIC PANEL: CPT | Performed by: INTERNAL MEDICINE

## 2019-06-18 PROCEDURE — 84153 ASSAY OF PSA TOTAL: CPT | Performed by: INTERNAL MEDICINE

## 2019-06-18 RX ORDER — SODIUM CHLORIDE 9 MG/ML
1000 INJECTION, SOLUTION INTRAVENOUS CONTINUOUS PRN
Status: CANCELLED
Start: 2019-06-19

## 2019-06-18 RX ORDER — METHYLPREDNISOLONE SODIUM SUCCINATE 125 MG/2ML
125 INJECTION, POWDER, LYOPHILIZED, FOR SOLUTION INTRAMUSCULAR; INTRAVENOUS
Status: CANCELLED
Start: 2019-06-19

## 2019-06-18 RX ORDER — DIPHENHYDRAMINE HYDROCHLORIDE 50 MG/ML
50 INJECTION INTRAMUSCULAR; INTRAVENOUS ONCE
Status: CANCELLED
Start: 2019-06-19

## 2019-06-18 RX ORDER — EPINEPHRINE 1 MG/ML
0.3 INJECTION, SOLUTION INTRAMUSCULAR; SUBCUTANEOUS EVERY 5 MIN PRN
Status: CANCELLED | OUTPATIENT
Start: 2019-06-19

## 2019-06-18 RX ORDER — HEPARIN SODIUM (PORCINE) LOCK FLUSH IV SOLN 100 UNIT/ML 100 UNIT/ML
500 SOLUTION INTRAVENOUS EVERY 8 HOURS
Status: DISCONTINUED | OUTPATIENT
Start: 2019-06-18 | End: 2019-06-18 | Stop reason: HOSPADM

## 2019-06-18 RX ORDER — EPINEPHRINE 0.3 MG/.3ML
0.3 INJECTION SUBCUTANEOUS EVERY 5 MIN PRN
Status: CANCELLED | OUTPATIENT
Start: 2019-06-19

## 2019-06-18 RX ORDER — DIPHENHYDRAMINE HYDROCHLORIDE 50 MG/ML
50 INJECTION INTRAMUSCULAR; INTRAVENOUS
Status: CANCELLED
Start: 2019-06-19

## 2019-06-18 RX ORDER — ALBUTEROL SULFATE 90 UG/1
1-2 AEROSOL, METERED RESPIRATORY (INHALATION)
Status: CANCELLED
Start: 2019-06-19

## 2019-06-18 RX ORDER — HEPARIN SODIUM (PORCINE) LOCK FLUSH IV SOLN 100 UNIT/ML 100 UNIT/ML
5 SOLUTION INTRAVENOUS DAILY PRN
Status: DISCONTINUED | OUTPATIENT
Start: 2019-06-18 | End: 2019-06-18 | Stop reason: HOSPADM

## 2019-06-18 RX ORDER — MEPERIDINE HYDROCHLORIDE 25 MG/ML
25 INJECTION INTRAMUSCULAR; INTRAVENOUS; SUBCUTANEOUS EVERY 30 MIN PRN
Status: CANCELLED | OUTPATIENT
Start: 2019-06-19

## 2019-06-18 RX ORDER — ALBUTEROL SULFATE 0.83 MG/ML
2.5 SOLUTION RESPIRATORY (INHALATION)
Status: CANCELLED | OUTPATIENT
Start: 2019-06-19

## 2019-06-18 RX ORDER — HEPARIN SODIUM (PORCINE) LOCK FLUSH IV SOLN 100 UNIT/ML 100 UNIT/ML
500 SOLUTION INTRAVENOUS EVERY 8 HOURS
Status: CANCELLED
Start: 2019-06-19

## 2019-06-18 RX ORDER — LORAZEPAM 2 MG/ML
0.5 INJECTION INTRAMUSCULAR EVERY 4 HOURS PRN
Status: CANCELLED
Start: 2019-06-19

## 2019-06-18 RX ORDER — DIPHENHYDRAMINE HYDROCHLORIDE 50 MG/ML
50 INJECTION INTRAMUSCULAR; INTRAVENOUS ONCE
Status: COMPLETED | OUTPATIENT
Start: 2019-06-18 | End: 2019-06-18

## 2019-06-18 RX ADMIN — DIPHENHYDRAMINE HYDROCHLORIDE 50 MG: 50 INJECTION, SOLUTION INTRAMUSCULAR; INTRAVENOUS at 11:26

## 2019-06-18 RX ADMIN — HEPARIN 500 UNITS: 100 SYRINGE at 13:16

## 2019-06-18 RX ADMIN — SODIUM CHLORIDE 250 ML: 9 INJECTION, SOLUTION INTRAVENOUS at 11:25

## 2019-06-18 RX ADMIN — DEXAMETHASONE SODIUM PHOSPHATE 12 MG: 10 INJECTION, SOLUTION INTRAMUSCULAR; INTRAVENOUS at 11:28

## 2019-06-18 RX ADMIN — HEPARIN SODIUM (PORCINE) LOCK FLUSH IV SOLN 100 UNIT/ML 5 ML: 100 SOLUTION at 09:59

## 2019-06-18 RX ADMIN — DOCETAXEL 180 MG: 80 INJECTION, SOLUTION, CONCENTRATE INTRAVENOUS at 11:48

## 2019-06-18 ASSESSMENT — PAIN SCALES - GENERAL: PAINLEVEL: NO PAIN (0)

## 2019-06-18 ASSESSMENT — MIFFLIN-ST. JEOR: SCORE: 1991.68

## 2019-06-18 NOTE — PROGRESS NOTES
Infusion Nursing Note:  Walter Reyes presents today for Cycle 3 Day 1 Taxotere.    Patient seen by provider today: Yes: KADEN Hidalgo   present during visit today: Not Applicable.    Note: pt took Decadron prior to infusion as prescribed.    Intravenous Access:  Implanted Port.    Treatment Conditions:  Lab Results   Component Value Date    HGB 12.4 06/18/2019     Lab Results   Component Value Date    WBC 11.8 06/18/2019      Lab Results   Component Value Date    ANEU 10.6 06/18/2019     Lab Results   Component Value Date     06/18/2019      Lab Results   Component Value Date     06/18/2019                   Lab Results   Component Value Date    POTASSIUM 3.8 06/18/2019           No results found for: MAG         Lab Results   Component Value Date    CR 1.04 06/18/2019                   Lab Results   Component Value Date    BETHANY 9.3 06/18/2019                Lab Results   Component Value Date    BILITOTAL 0.5 06/18/2019           Lab Results   Component Value Date    ALBUMIN 3.7 06/18/2019                    Lab Results   Component Value Date    ALT 30 06/18/2019           Lab Results   Component Value Date    AST 20 06/18/2019       Results reviewed, labs MET treatment parameters, ok to proceed with treatment.      Post Infusion Assessment:  Patient tolerated infusion without incident.  Blood return noted pre and post infusion.  Site patent and intact, free from redness, edema or discomfort.  No evidence of extravasations.  Access discontinued per protocol.       Discharge Plan:   Prescription refills given for Decadron, Compazine.  Copy of AVS reviewed with patient and/or family. Next infusion not scheduled at time of discharge today. Pt due for next infusion on 7/10.  Pt instructed to follow-up with schedulers or triage if he/she has not been notified of next appointment date and time.  Pt provided with triage phone number ([phone]).   Pt stated understanding of  plan.  Patient discharged in stable condition accompanied by: wife and son.    MARIA EUGENIA NEIL RN

## 2019-06-18 NOTE — PROGRESS NOTES
"Oncology/Hematology Visit Note  Jun 18, 2019    Reason for Visit: Evaluation before cycle 3 of docetaxel + ADT for metastatic hormone-sensitive prostate cancer.    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 His oncologic history is detailed below. Please see Dr. Smalls's previous notes for further details on the patient's history. He comes in today for routine follow up prior to cycle 3.    ONCOLOGIC HISTORY:  1. Prostate adenocarcinoma, stage IV (M1b at diagnosis), high-volume, castration-sensitive:  - 12/13/2018: PSA found to be elevated to 9.1 ng/mL on a routine follow-up with primary care provider Dr. Naylor at [facility]. Prior PSA were 1.4 on 8/16/17, 2.4 on 6/28/16, 2.9 on 6/28/16, and as low as 0.4 on 3/26/2003.   - 1/08/2019: Consultation with Sarah Wilson CNP in Urology clinic. Repeat PSA 9.6.  - 1/16/2019: MRI prostate with contrast - \"This examination is characterized as PIRADS 5- very high probability. Clinically significant cancer is highly likely to be present. There is a large, invasive mass arising from the right peripheral zone and extending into the neurovascular bundle, seminal vesicle, and along the anterolateral right mesorectal fascia. Metastatic right external iliac lymph node. Metastatic lesion in the posterior/superior right acetabulum.\"  - 1/25/2019: CT abdomen and pelvis with IV contrast - \"1. Heterogeneous enhancing mass posteriorly in the upper pole of the right kidney measures 4.5 x 5.8 x 5.7 cm (AP by transverse by craniocaudal). It has a small nodular component extending posterior medially which abuts the right psoas muscle. This nodular extension measures 2.1 x 2.1 cm. Minimal stranding about the mass. No definite thrombus within the right renal vein. This renal mass is compatible with a renal cell carcinoma. A paraaortic lymph node situated immediately " "posterior to the left renal vein measures 1.1 cm in short axis, suspicious for metastasis. 2. A 2 cm right external iliac lymph node is also suspicious for metastases. 3. Multiple sclerotic osseous lesions suspicious for metastases. These include 0.8 and 0.6 cm sclerotic lesions laterally in the right iliac wing (images 53 and 62 respectively). Ill-defined groundglass density laterally in the right acetabulum measuring 1.7 x 1.2 cm corresponds with the lesion identified on MRI. A 0.4 cm groundglass density in the left acetabulum. Sclerotic lesion in the left femoral neck measures 0.9 x 1.6 cm (image 81). Sclerotic metastases would be more compatible with prostate metastases. 4. A 3 subcentimeter hepatic lesions are indeterminate. Metastases would be a consideration. These can be further characterized with liver MRI.\"  - 1/25/2019: NM bone scan - \"There is focal bony uptake in the left femoral neck, right acetabulum, right of midline at the S1 or L5 level of the spine, within multiple bilateral ribs, in the C7 or T1 level of the spine, and anteriorly within the skull. Findings are suspicious for metastases.\"  - 1/31/19: CT chest with contrast - \" No suspicious nodules in the chest.  Stable appearance of a 5.8 cm right renal mass concerning for renal carcinoma until proven otherwise.  Stable indeterminant subcentimeter hypodensities in the liver.  Multifocal osteoblastic metastasis including 2.5 cm lesion in the right fifth rib posteriorly and a 1.6 cm lesion in the right third rib posteriorly. No lytic lesions identified.\"  - 2/6/19: CT-guided right sclerotic fifth rib lesion biopsy - \"Metastatic carcinoma, consistent with prostate primary.  Immunohistochemical stains performed show the metastatic carcinoma stains positive for NKX3.1 (prostate marker), negative for STACIE 3 and PAX8, which supports the above diagnosis.\"  - 2/15/19: Started Casodex 50mg every day and consented for the biobanking protocol. 3/18/19 - " "stopped Casodex.  - 2/19/19: Case discussed in tumor board - recommendation for nephectomy for suspected malignant right renal mass.   - 02/26/19: Started Lupron 22.5mg every 3 months. 05/29/19 - cycle 2.   - 5/8/19: Started Docetaxel 75mg/m2 IV every 3 weeks. 05/29/19 - cycle 2.      2. Stage III ([id number]), grade 3 of 4, clear-cell RCC of right kidney:  - Incidentally diagnosed as above.   - Underwent curative-intent robotic right radical nephrectomy with Dr. Wesley Cleveland on 3/19/19. No tumor spillage per op report.   - Path showed: \"Histologic Type: Clear cell renal cell carcinoma; Sarcomatoid Features: Not identified; Histologic Grade: Nucleolar grade 3 (WHO/ISUP). Extent Tumor Size: 4.3 cm. Microscopic Tumor Extension: Tumor extension into renal sinus (in vascular structures). Margins: Negative. Tumor Necrosis: Present; focal. Lymph-Vascular Invasion: Not identified.  Pathologic Staging (pTNM) Primary Tumor (pT): pT3a: Tumor extends into renal vein branches/renal sinus. Regional Lymph Nodes (pN): pNX. Number of Lymph Nodes Examined: 0 Distant Metastasis (pM): pM N/A.\"    Interval History:  Alvino is here with his wife and son. He had numbness in teeth/gums and some tongue swelling during the first infusion, but this did not happen the second infusion. He has some nausea after dex completed. Denies any vomiting. Compazine is effective. Dysgeusia for about 4-5 days. He did have mild mucositis but pain reduced by half by using salt/soda rinses preventatively. Resolved after about 4-5 days. Around day 7 he experienced cold sweats, dizziness, weakness and abdominal pain. He took compazine and symptoms resolved.     He denies any joint pain this second cycle. He does have occasional pain in right upper back and mid-chest to left of sternum and is wondering if he has bone mets in these areas. He states pain is mild and infrequent and has not needed any tylenol. He states he had previous stress test as pain to left of " sternum existed prior to chemo. He has not had any additional hair loss. He denies any low-grade temps or fevers. He felt his energy recovered about a day earlier this cycle. He has chronic right foot numbness for past 2 years, which is unchanged with docetaxel; had noticed a slight increase in chronic left great toe numbness with docetaxel, but stable from last cycle     No hematuria, SOB/cough, rash etc. Chronic obstructive urinary symptoms unchanged. Wants to continue with docetaxel chemotherapy. No signs/symptoms concerning for disease progression.    Current Outpatient Medications   Medication Sig Dispense Refill     amLODIPine (NORVASC) 10 MG tablet Take 1 tablet (10 mg) by mouth daily 30 tablet 0     atorvastatin (LIPITOR) 20 MG tablet Take 20 mg by mouth daily       calcium citrate-vitamin D (CITRACAL) 315-250 MG-UNIT TABS per tablet Take 650 mg by mouth 2 times daily        cetirizine (ZYRTEC) 10 MG tablet Take 10 mg by mouth as needed       cinnamon 500 MG CAPS Take 500 mg by mouth daily        cycloSPORINE (RESTASIS) 0.05 % ophthalmic emulsion Place 1 drop into both eyes 2 times daily        dexamethasone (DECADRON) 4 MG tablet Take 8 mg by mouth 2 times daily Evening PRIOR and continue morning of Docetaxel, then 4 additional doses.. 12 tablet 5     fluticasone (FLONASE) 50 MCG/ACT nasal spray Spray 2 sprays in nostril daily as needed        Glucosamine-Chondroit-Vit C-Mn (GLUCOSAMINE 1500 COMPLEX) CAPS Take 1 capsule by mouth daily       levothyroxine (SYNTHROID/LEVOTHROID) 125 MCG tablet Take 125 mcg by mouth daily       Multiple Vitamins-Minerals (MULTIVITAMIN ADULT EXTRA C PO) Take 1 tablet by mouth every 24 hours       Nutritional Supplements (SALMON OIL) CAPS Take 1 capsule by mouth daily       omeprazole (PRILOSEC) 20 MG DR capsule Take 20 mg by mouth daily       prochlorperazine (COMPAZINE) 10 MG tablet Take 1 tablet (10 mg) by mouth every 6 hours as needed (Nausea/Vomiting) 30 tablet 5        Physical Examination:  General: The patient is a pleasant male in no acute distress.  There were no vitals taken for this visit.  Wt Readings from Last 10 Encounters:   05/29/19 113.1 kg (249 lb 4.8 oz)   05/08/19 111.9 kg (246 lb 9.6 oz)   05/02/19 108.9 kg (240 lb)   04/30/19 111 kg (244 lb 12.8 oz)   04/01/19 105.2 kg (232 lb)   03/26/19 105.9 kg (233 lb 6.4 oz)   03/21/19 108.5 kg (239 lb 4.8 oz)   03/11/19 108.9 kg (240 lb)   02/26/19 92.8 kg (204 lb 8 oz)   02/12/19 110 kg (242 lb 6.4 oz)     HEENT: EOMI, PERRL. Sclerae are anicteric. Oral mucosa is pink and moist. Gingiva and buccal mucosa with mild edema but no lesions or thrush.   Lymph: Neck is supple with no lymphadenopathy in the cervical or supraclavicular areas.   Heart: Regular rate and rhythm.   Lungs: Clear to auscultation bilaterally.   Abdomen: Bowel sounds present, soft, nontender with no palpable hepatosplenomegaly or masses.   Extremities: No lower extremity edema noted bilaterally.   Neuro: Cranial nerves II through XII are grossly intact.  Skin: No rashes, petechiae, or bruising noted on exposed skin.    Laboratory Data:  Results for AZRA SIERRA (MRN [id number]) as of 6/18/2019 11:03   6/18/2019 10:05   Sodium 140   Potassium 3.8   Chloride 107   Carbon Dioxide 24   Urea Nitrogen 21   Creatinine 1.04   GFR Estimate 79   GFR Estimate If Black >90   Calcium 9.3   Anion Gap 10   Albumin 3.7   Protein Total 7.6   Bilirubin Total 0.5   Alkaline Phosphatase 62   ALT 30   AST 20   Lactate Dehydrogenase 220   PSA 0.80   Glucose 136 (H)   WBC 11.8 (H)   Hemoglobin 12.4 (L)   Hematocrit 36.7 (L)   Platelet Count 233   RBC Count 4.14 (L)   MCV 89   MCH 30.0   MCHC 33.8   RDW 15.4 (H)   Diff Method Automated Method   % Neutrophils 89.3   % Lymphocytes 8.1   % Monocytes 1.7   % Eosinophils 0.1   % Basophils 0.1   % Immature Granulocytes 0.7   Nucleated RBCs 0   Absolute Neutrophil 10.6 (H)   Absolute Lymphocytes 1.0   Absolute Monocytes  "0.2   Absolute Eosinophils 0.0   Absolute Basophils 0.0   Abs Immature Granulocytes 0.1   Absolute Nucleated RBC 0.0       Assessment and Plan:  Mr. Reyes is a delightful 59-year-old gentleman with newly diagnosed metastatic castration sensitive prostate adenocarcinoma as well as a stage III clear-cell renal cell carcinoma of right kidney, who is here for a follow-up visit.      Metastatic prostate cancer:   - Patient meets the criteria for CHAARTED \"high-volume\" metastatic hormone-sensitive prostate cancer.  - I do not have a clinical trial for this gentleman at this time, especially given the suspicion for a concurrent malignancy.  He has enrolled in the institutional biobanking study on 2/15/19 but this is a non-interventional study.   - After a review of systemic therapy options, patient has opted to start docetaxel in combination with ADT (per JOSEFA, [id number], KARLA).   - Reviewed restaging scan results that show likely early response to ADT.  - Plan to continue with cycle 2 docetaxel today - regimen will be docetaxel 75mg/m2 IV every 3 weeks for up to 6 cycles. No primary prophy G-CSF.  - Patient developed a grade 1 allergic reaction with cycle 1. Dose interrupted and given solumedrol and benadryl. RN successfully able to reinitiate docetaxel and patient tolerated remainder of infusion well. Will add dex and benadryl premeds for all subsequent cycles.  - Risks/benefits of docetaxel including allergic reaction, fatigue, nausea/vomiting/constipation, myelosuppression, neuropathy, LFT changes etc discussed in detail.   - Encouraged to proactively use salt-soda rinses to prevent and treat mucositis for all subsequent cycles.  - Continue Lupron 22.5mg every 3 monthly - last dose on 5/29.  - He understands the risks and benefits of ADT including hot flashes, mood changes and long-term cardiovascular, bone health, etc. Also understands the risks of docetaxel treatment including fatigue, nausea, " vomiting, diarrhea/constipation, hand-foot syndrome, allergic reactions, myelosuppression with increased risk of infection and bleeding etc.  - Will repeat restaging scans after 3 cycles.   - Will also add antiresorptive therapy based on restaging results after chemo.      Stage 3, UISS-high risk ccRCC:  - Patient understands that he has a stage III, UISS high risk clear-cell renal cell carcinoma with approximately 40-50% risk of recurrence after definitive surgery.   - At this time, he has no clear evidence of residual disease.  The retroperitoneal lymphadenopathy is more consistent with metastatic prostate cancer.  - He understands the role of adjuvant therapies in RCC and that at best, the data for adjuvant TKI therapy is mixed and shows an improvement in disease-free survival and not overall survival.   - Continue active surveillance with self-reporting for signs/symptoms of recurrence (explained in detail) and periodic H&P and scans.      Genetics referral:  - Has been seen by Genetics clinic on 3/7/19. No evidence of inherited cancer syndromes found on workup.      Research participation:  - Enrolled in  biobanking study.      Briseida Amos PA-C  [facility]  [address] [place] [id number]  [phone]

## 2019-06-18 NOTE — PATIENT INSTRUCTIONS
Contact Numbers    [facility] Main Line: [phone]  [facility] Triage and after hours / weekends / holidays:  [phone]      Please call the triage or after hours line if you experience a temperature greater than or equal to 100.5, shaking chills, have uncontrolled nausea, vomiting and/or diarrhea, dizziness, shortness of breath, chest pain, bleeding, unexplained bruising, or if you have any other new/concerning symptoms, questions or concerns.      If you are having any concerning symptoms or wish to speak to a provider before your next infusion visit, please call your care coordinator or triage to notify them so we can adequately serve you.     If you need a refill on a narcotic prescription or other medication, please call before your infusion appointment.                 June 2019 Sunday Monday Tuesday Wednesday Thursday Friday Saturday                                 1       2     3     4     5     6     7     8       9     10     11     12     13     14     15       16     17     18    [facility] DRAW   9:45 AM   (15 min.)    [facility] DRAW   [facility] Lab Draw    [facility] RETURN  10:15 AM   (50 min.)   Briseida Amos PA   [facility]  11:30 AM   (120 min.)    ONCOLOGY INFUSION   [facility] 19     20     21     22       23     24     25     26     27     28     29       30                                               July 2019 Sunday Monday Tuesday Wednesday Thursday Friday Saturday        1    XR CHEST 2 VIEWS  10:00 AM   (15 min.)   [id number]   [facility]    CT ABDOMEN PELVIS W  10:15 AM   (30 min.)   RSCCCT1   [facility] 2     3     4     5     6       7     8     9     10     11    [facility] RETURN   3:45 PM   (15 min.)   Wesley Cleveland MD   [facility] [place] 12     13       14     15     16     17     18     19     20       21     22      23     24     25     26     27       28     29     30     31                                   Recent Results (from the past 24 hour(s))   CBC with platelets differential    Collection Time: 06/18/19 10:05 AM   Result Value Ref Range    WBC 11.8 (H) 4.0 - 11.0 10e9/L    RBC Count 4.14 (L) 4.4 - 5.9 10e12/L    Hemoglobin 12.4 (L) 13.3 - 17.7 g/dL    Hematocrit 36.7 (L) 40.0 - 53.0 %    MCV 89 78 - 100 fl    MCH 30.0 26.5 - 33.0 pg    MCHC 33.8 31.5 - 36.5 g/dL    RDW 15.4 (H) 10.0 - 15.0 %    Platelet Count 233 150 - 450 10e9/L    Diff Method Automated Method     % Neutrophils 89.3 %    % Lymphocytes 8.1 %    % Monocytes 1.7 %    % Eosinophils 0.1 %    % Basophils 0.1 %    % Immature Granulocytes 0.7 %    Nucleated RBCs 0 0 /100    Absolute Neutrophil 10.6 (H) 1.6 - 8.3 10e9/L    Absolute Lymphocytes 1.0 0.8 - 5.3 10e9/L    Absolute Monocytes 0.2 0.0 - 1.3 10e9/L    Absolute Eosinophils 0.0 0.0 - 0.7 10e9/L    Absolute Basophils 0.0 0.0 - 0.2 10e9/L    Abs Immature Granulocytes 0.1 0 - 0.4 10e9/L    Absolute Nucleated RBC 0.0    Lactate Dehydrogenase    Collection Time: 06/18/19 10:05 AM   Result Value Ref Range    Lactate Dehydrogenase 220 85 - 227 U/L   Comprehensive metabolic panel    Collection Time: 06/18/19 10:05 AM   Result Value Ref Range    Sodium 140 133 - 144 mmol/L    Potassium 3.8 3.4 - 5.3 mmol/L    Chloride 107 94 - 109 mmol/L    Carbon Dioxide 24 20 - 32 mmol/L    Anion Gap 10 3 - 14 mmol/L    Glucose 136 (H) 70 - 99 mg/dL    Urea Nitrogen 21 7 - 30 mg/dL    Creatinine 1.04 0.66 - 1.25 mg/dL    GFR Estimate 79 >60 mL/min/[1.73_m2]    GFR Estimate If Black >90 >60 mL/min/[1.73_m2]    Calcium 9.3 8.5 - 10.1 mg/dL    Bilirubin Total 0.5 0.2 - 1.3 mg/dL    Albumin 3.7 3.4 - 5.0 g/dL    Protein Total 7.6 6.8 - 8.8 g/dL    Alkaline Phosphatase 62 40 - 150 U/L    ALT 30 0 - 70 U/L    AST 20 0 - 45 U/L   PSA tumor marker    Collection Time: 06/18/19 10:05 AM   Result Value Ref Range    PSA 0.80 0 - 4 ug/L

## 2019-06-18 NOTE — NURSING NOTE
Chief Complaint   Patient presents with     Port Draw     Labs drawn via Port by RN in lab. VS taken.      Labs drawn via Port accessed using 20g gripper needle. Line flushed and Heparin locked. Vital signs taken. Checked into next appointment.       Veda Leon RN

## 2019-06-18 NOTE — NURSING NOTE
"Oncology Rooming Note    June 18, 2019 10:25 AM   Walter Reyes is a 59 year old male who presents for:    Chief Complaint   Patient presents with     Port Draw     Labs drawn via Port by RN in lab. VS taken.      Oncology Clinic Visit     F/U Prostste Ca     Initial Vitals: /78   Pulse 91   Temp 98.1  F (36.7  C) (Oral)   Resp 16   Ht 1.829 m (6' 0.01\")   Wt 112.9 kg (248 lb 12.8 oz)   SpO2 98%   BMI 33.74 kg/m   Estimated body mass index is 33.74 kg/m  as calculated from the following:    Height as of this encounter: 1.829 m (6' 0.01\").    Weight as of this encounter: 112.9 kg (248 lb 12.8 oz). Body surface area is 2.39 meters squared.  No Pain (0) Comment: Data Unavailable   No LMP for male patient.  Allergies reviewed: Yes  Medications reviewed: Yes    Medications: MEDICATION REFILLS NEEDED TODAY. Provider was notified.  Pharmacy name entered into EPIC: [facility] [place], [place] - [address] [address]    Clinical concerns: NONE, Briseida was NOT notified.      JADE SWARTZ LPN            "

## 2019-06-18 NOTE — LETTER
"6/18/2019      RE: Walter Reyes  [address] [address] [facility] [address]       Oncology/Hematology Visit Note  Jun 18, 2019    Reason for Visit: Evaluation before cycle 3 of docetaxel + ADT for metastatic hormone-sensitive prostate cancer.    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 His oncologic history is detailed below. Please see Dr. Smalls's previous notes for further details on the patient's history. He comes in today for routine follow up prior to cycle 3.    ONCOLOGIC HISTORY:  1. Prostate adenocarcinoma, stage IV (M1b at diagnosis), high-volume, castration-sensitive:  - 12/13/2018: PSA found to be elevated to 9.1 ng/mL on a routine follow-up with primary care provider Dr. Naylor at [facility]. Prior PSA were 1.4 on 8/16/17, 2.4 on 6/28/16, 2.9 on 6/28/16, and as low as 0.4 on 3/26/2003.   - 1/08/2019: Consultation with Sarah Wilson CNP in Urology clinic. Repeat PSA 9.6.  - 1/16/2019: MRI prostate with contrast - \"This examination is characterized as PIRADS 5- very high probability. Clinically significant cancer is highly likely to be present. There is a large, invasive mass arising from the right peripheral zone and extending into the neurovascular bundle, seminal vesicle, and along the anterolateral right mesorectal fascia. Metastatic right external iliac lymph node. Metastatic lesion in the posterior/superior right acetabulum.\"  - 1/25/2019: CT abdomen and pelvis with IV contrast - \"1. Heterogeneous enhancing mass posteriorly in the upper pole of the right kidney measures 4.5 x 5.8 x 5.7 cm (AP by transverse by craniocaudal). It has a small nodular component extending posterior medially which abuts the right psoas muscle. This nodular extension measures 2.1 x 2.1 cm. Minimal stranding about the mass. No definite thrombus within the right renal vein. This renal " "mass is compatible with a renal cell carcinoma. A paraaortic lymph node situated immediately posterior to the left renal vein measures 1.1 cm in short axis, suspicious for metastasis. 2. A 2 cm right external iliac lymph node is also suspicious for metastases. 3. Multiple sclerotic osseous lesions suspicious for metastases. These include 0.8 and 0.6 cm sclerotic lesions laterally in the right iliac wing (images 53 and 62 respectively). Ill-defined groundglass density laterally in the right acetabulum measuring 1.7 x 1.2 cm corresponds with the lesion identified on MRI. A 0.4 cm groundglass density in the left acetabulum. Sclerotic lesion in the left femoral neck measures 0.9 x 1.6 cm (image 81). Sclerotic metastases would be more compatible with prostate metastases. 4. A 3 subcentimeter hepatic lesions are indeterminate. Metastases would be a consideration. These can be further characterized with liver MRI.\"  - 1/25/2019: NM bone scan - \"There is focal bony uptake in the left femoral neck, right acetabulum, right of midline at the S1 or L5 level of the spine, within multiple bilateral ribs, in the C7 or T1 level of the spine, and anteriorly within the skull. Findings are suspicious for metastases.\"  - 1/31/19: CT chest with contrast - \" No suspicious nodules in the chest.  Stable appearance of a 5.8 cm right renal mass concerning for renal carcinoma until proven otherwise.  Stable indeterminant subcentimeter hypodensities in the liver.  Multifocal osteoblastic metastasis including 2.5 cm lesion in the right fifth rib posteriorly and a 1.6 cm lesion in the right third rib posteriorly. No lytic lesions identified.\"  - 2/6/19: CT-guided right sclerotic fifth rib lesion biopsy - \"Metastatic carcinoma, consistent with prostate primary.  Immunohistochemical stains performed show the metastatic carcinoma stains positive for NKX3.1 (prostate marker), negative for STACIE 3 and PAX8, which supports the above diagnosis.\"  - " "2/15/19: Started Casodex 50mg every day and consented for the biobanking protocol. 3/18/19 - stopped Casodex.  - 2/19/19: Case discussed in tumor board - recommendation for nephectomy for suspected malignant right renal mass.   - 02/26/19: Started Lupron 22.5mg every 3 months. 05/29/19 - cycle 2.   - 5/8/19: Started Docetaxel 75mg/m2 IV every 3 weeks. 05/29/19 - cycle 2.      2. Stage III ([id number]), grade 3 of 4, clear-cell RCC of right kidney:  - Incidentally diagnosed as above.   - Underwent curative-intent robotic right radical nephrectomy with Dr. Wesley Cleveland on 3/19/19. No tumor spillage per op report.   - Path showed: \"Histologic Type: Clear cell renal cell carcinoma; Sarcomatoid Features: Not identified; Histologic Grade: Nucleolar grade 3 (WHO/ISUP). Extent Tumor Size: 4.3 cm. Microscopic Tumor Extension: Tumor extension into renal sinus (in vascular structures). Margins: Negative. Tumor Necrosis: Present; focal. Lymph-Vascular Invasion: Not identified.  Pathologic Staging (pTNM) Primary Tumor (pT): pT3a: Tumor extends into renal vein branches/renal sinus. Regional Lymph Nodes (pN): pNX. Number of Lymph Nodes Examined: 0 Distant Metastasis (pM): pM N/A.\"    Interval History:  Alvino is here with his wife and son. He had numbness in teeth/gums and some tongue swelling during the first infusion, but this did not happen the second infusion. He has some nausea after dex completed. Denies any vomiting. Compazine is effective. Dysgeusia for about 4-5 days. He did have mild mucositis but pain reduced by half by using salt/soda rinses preventatively. Resolved after about 4-5 days. Around day 7 he experienced cold sweats, dizziness, weakness and abdominal pain. He took compazine and symptoms resolved.     He denies any joint pain this second cycle. He does have occasional pain in right upper back and mid-chest to left of sternum and is wondering if he has bone mets in these areas. He states pain is mild and " infrequent and has not needed any tylenol. He states he had previous stress test as pain to left of sternum existed prior to chemo. He has not had any additional hair loss. He denies any low-grade temps or fevers. He felt his energy recovered about a day earlier this cycle. He has chronic right foot numbness for past 2 years, which is unchanged with docetaxel; had noticed a slight increase in chronic left great toe numbness with docetaxel, but stable from last cycle     No hematuria, SOB/cough, rash etc. Chronic obstructive urinary symptoms unchanged. Wants to continue with docetaxel chemotherapy. No signs/symptoms concerning for disease progression.    Current Outpatient Medications   Medication Sig Dispense Refill     amLODIPine (NORVASC) 10 MG tablet Take 1 tablet (10 mg) by mouth daily 30 tablet 0     atorvastatin (LIPITOR) 20 MG tablet Take 20 mg by mouth daily       calcium citrate-vitamin D (CITRACAL) 315-250 MG-UNIT TABS per tablet Take 650 mg by mouth 2 times daily        cetirizine (ZYRTEC) 10 MG tablet Take 10 mg by mouth as needed       cinnamon 500 MG CAPS Take 500 mg by mouth daily        cycloSPORINE (RESTASIS) 0.05 % ophthalmic emulsion Place 1 drop into both eyes 2 times daily        dexamethasone (DECADRON) 4 MG tablet Take 8 mg by mouth 2 times daily Evening PRIOR and continue morning of Docetaxel, then 4 additional doses.. 12 tablet 5     fluticasone (FLONASE) 50 MCG/ACT nasal spray Spray 2 sprays in nostril daily as needed        Glucosamine-Chondroit-Vit C-Mn (GLUCOSAMINE 1500 COMPLEX) CAPS Take 1 capsule by mouth daily       levothyroxine (SYNTHROID/LEVOTHROID) 125 MCG tablet Take 125 mcg by mouth daily       Multiple Vitamins-Minerals (MULTIVITAMIN ADULT EXTRA C PO) Take 1 tablet by mouth every 24 hours       Nutritional Supplements (SALMON OIL) CAPS Take 1 capsule by mouth daily       omeprazole (PRILOSEC) 20 MG DR capsule Take 20 mg by mouth daily       prochlorperazine (COMPAZINE) 10 MG  tablet Take 1 tablet (10 mg) by mouth every 6 hours as needed (Nausea/Vomiting) 30 tablet 5       Physical Examination:  General: The patient is a pleasant male in no acute distress.  There were no vitals taken for this visit.  Wt Readings from Last 10 Encounters:   05/29/19 113.1 kg (249 lb 4.8 oz)   05/08/19 111.9 kg (246 lb 9.6 oz)   05/02/19 108.9 kg (240 lb)   04/30/19 111 kg (244 lb 12.8 oz)   04/01/19 105.2 kg (232 lb)   03/26/19 105.9 kg (233 lb 6.4 oz)   03/21/19 108.5 kg (239 lb 4.8 oz)   03/11/19 108.9 kg (240 lb)   02/26/19 92.8 kg (204 lb 8 oz)   02/12/19 110 kg (242 lb 6.4 oz)     HEENT: EOMI, PERRL. Sclerae are anicteric. Oral mucosa is pink and moist. Gingiva and buccal mucosa with mild edema but no lesions or thrush.   Lymph: Neck is supple with no lymphadenopathy in the cervical or supraclavicular areas.   Heart: Regular rate and rhythm.   Lungs: Clear to auscultation bilaterally.   Abdomen: Bowel sounds present, soft, nontender with no palpable hepatosplenomegaly or masses.   Extremities: No lower extremity edema noted bilaterally.   Neuro: Cranial nerves II through XII are grossly intact.  Skin: No rashes, petechiae, or bruising noted on exposed skin.    Laboratory Data:  Results for AZRA SIERRA (MRN [id number]) as of 6/18/2019 11:03   6/18/2019 10:05   Sodium 140   Potassium 3.8   Chloride 107   Carbon Dioxide 24   Urea Nitrogen 21   Creatinine 1.04   GFR Estimate 79   GFR Estimate If Black >90   Calcium 9.3   Anion Gap 10   Albumin 3.7   Protein Total 7.6   Bilirubin Total 0.5   Alkaline Phosphatase 62   ALT 30   AST 20   Lactate Dehydrogenase 220   PSA 0.80   Glucose 136 (H)   WBC 11.8 (H)   Hemoglobin 12.4 (L)   Hematocrit 36.7 (L)   Platelet Count 233   RBC Count 4.14 (L)   MCV 89   MCH 30.0   MCHC 33.8   RDW 15.4 (H)   Diff Method Automated Method   % Neutrophils 89.3   % Lymphocytes 8.1   % Monocytes 1.7   % Eosinophils 0.1   % Basophils 0.1   % Immature Granulocytes 0.7  "  Nucleated RBCs 0   Absolute Neutrophil 10.6 (H)   Absolute Lymphocytes 1.0   Absolute Monocytes 0.2   Absolute Eosinophils 0.0   Absolute Basophils 0.0   Abs Immature Granulocytes 0.1   Absolute Nucleated RBC 0.0       Assessment and Plan:  Mr. Reyes is a delightful 59-year-old gentleman with newly diagnosed metastatic castration sensitive prostate adenocarcinoma as well as a stage III clear-cell renal cell carcinoma of right kidney, who is here for a follow-up visit.      Metastatic prostate cancer:   - Patient meets the criteria for CHAARTED \"high-volume\" metastatic hormone-sensitive prostate cancer.  - I do not have a clinical trial for this gentleman at this time, especially given the suspicion for a concurrent malignancy.  He has enrolled in the institutional biobanking study on 2/15/19 but this is a non-interventional study.   - After a review of systemic therapy options, patient has opted to start docetaxel in combination with ADT (per JOSEFA, [id number], KARLA).   - Reviewed restaging scan results that show likely early response to ADT.  - Plan to continue with cycle 2 docetaxel today - regimen will be docetaxel 75mg/m2 IV every 3 weeks for up to 6 cycles. No primary prophy G-CSF.  - Patient developed a grade 1 allergic reaction with cycle 1. Dose interrupted and given solumedrol and benadryl. RN successfully able to reinitiate docetaxel and patient tolerated remainder of infusion well. Will add dex and benadryl premeds for all subsequent cycles.  - Risks/benefits of docetaxel including allergic reaction, fatigue, nausea/vomiting/constipation, myelosuppression, neuropathy, LFT changes etc discussed in detail.   - Encouraged to proactively use salt-soda rinses to prevent and treat mucositis for all subsequent cycles.  - Continue Lupron 22.5mg every 3 monthly - last dose on 5/29.  - He understands the risks and benefits of ADT including hot flashes, mood changes and long-term cardiovascular, " bone health, etc. Also understands the risks of docetaxel treatment including fatigue, nausea, vomiting, diarrhea/constipation, hand-foot syndrome, allergic reactions, myelosuppression with increased risk of infection and bleeding etc.  - Will repeat restaging scans after 3 cycles.   - Will also add antiresorptive therapy based on restaging results after chemo.      Stage 3, UISS-high risk ccRCC:  - Patient understands that he has a stage III, UISS high risk clear-cell renal cell carcinoma with approximately 40-50% risk of recurrence after definitive surgery.   - At this time, he has no clear evidence of residual disease.  The retroperitoneal lymphadenopathy is more consistent with metastatic prostate cancer.  - He understands the role of adjuvant therapies in RCC and that at best, the data for adjuvant TKI therapy is mixed and shows an improvement in disease-free survival and not overall survival.   - Continue active surveillance with self-reporting for signs/symptoms of recurrence (explained in detail) and periodic H&P and scans.      Genetics referral:  - Has been seen by Genetics clinic on 3/7/19. No evidence of inherited cancer syndromes found on workup.      Research participation:  - Enrolled in  biobanking study.      Briseida Amos PA-C  [facility]  [address], [place] [id number] [phone]

## 2019-06-20 LAB — CGA SERPL-MCNC: 646 NG/ML (ref 0–95)

## 2019-06-24 ENCOUNTER — TELEPHONE (OUTPATIENT)
Dept: ONCOLOGY | Facility: CLINIC | Age: 59
End: 2019-06-24

## 2019-06-24 NOTE — TELEPHONE ENCOUNTER
Pt called in to triage reporting difficulty staying asleep at night since his infusion 6/18, he received IV steroids that day. He will fall asleep but wake up after a few hours and remain awake all night. Advised pt ok to try OTC benadryl or Unisom for a few nights. If either does not work he should call back, he verbalized understanding.

## 2019-07-02 ENCOUNTER — HOSPITAL ENCOUNTER (OUTPATIENT)
Dept: CT IMAGING | Facility: CLINIC | Age: 59
Discharge: HOME | End: 2019-07-02
Attending: PHYSICIAN ASSISTANT | Admitting: PHYSICIAN ASSISTANT
Payer: COMMERCIAL

## 2019-07-02 ENCOUNTER — HOSPITAL ENCOUNTER (OUTPATIENT)
Dept: NUCLEAR MEDICINE | Facility: CLINIC | Age: 59
Setting detail: NUCLEAR MEDICINE
End: 2019-07-02
Attending: PHYSICIAN ASSISTANT
Payer: COMMERCIAL

## 2019-07-02 ENCOUNTER — HOSPITAL ENCOUNTER (OUTPATIENT)
Dept: NUCLEAR MEDICINE | Facility: CLINIC | Age: 59
Setting detail: NUCLEAR MEDICINE
Discharge: HOME | End: 2019-07-02
Attending: PHYSICIAN ASSISTANT | Admitting: PHYSICIAN ASSISTANT
Payer: COMMERCIAL

## 2019-07-02 DIAGNOSIS — C61 MALIGNANT NEOPLASM OF PROSTATE METASTATIC TO BONE (H): ICD-10-CM

## 2019-07-02 DIAGNOSIS — C79.51 MALIGNANT NEOPLASM OF PROSTATE METASTATIC TO BONE (H): ICD-10-CM

## 2019-07-02 DIAGNOSIS — C64.1 RENAL CELL CARCINOMA, RIGHT (H): ICD-10-CM

## 2019-07-02 PROCEDURE — 71260 CT THORAX DX C+: CPT

## 2019-07-02 PROCEDURE — 25000125 ZZHC RX 250: Performed by: RADIOLOGY

## 2019-07-02 PROCEDURE — A9561 TC99M OXIDRONATE: HCPCS | Performed by: PHYSICIAN ASSISTANT

## 2019-07-02 PROCEDURE — 25000128 H RX IP 250 OP 636: Performed by: RADIOLOGY

## 2019-07-02 PROCEDURE — 34300033 ZZH RX 343: Performed by: PHYSICIAN ASSISTANT

## 2019-07-02 PROCEDURE — 78306 BONE IMAGING WHOLE BODY: CPT

## 2019-07-02 PROCEDURE — 74177 CT ABD & PELVIS W/CONTRAST: CPT

## 2019-07-02 RX ORDER — IOPAMIDOL 755 MG/ML
100 INJECTION, SOLUTION INTRAVASCULAR ONCE
Status: COMPLETED | OUTPATIENT
Start: 2019-07-02 | End: 2019-07-02

## 2019-07-02 RX ADMIN — IOPAMIDOL 100 ML: 755 INJECTION, SOLUTION INTRAVENOUS at 08:06

## 2019-07-02 RX ADMIN — SODIUM CHLORIDE 70 ML: 9 INJECTION, SOLUTION INTRAVENOUS at 08:06

## 2019-07-02 RX ADMIN — Medication 24.2 MILLICURIE: at 07:42

## 2019-07-10 ENCOUNTER — ONCOLOGY VISIT (OUTPATIENT)
Dept: ONCOLOGY | Facility: CLINIC | Age: 59
End: 2019-07-10
Attending: INTERNAL MEDICINE
Payer: COMMERCIAL

## 2019-07-10 VITALS
BODY MASS INDEX: 32.93 KG/M2 | DIASTOLIC BLOOD PRESSURE: 74 MMHG | HEART RATE: 67 BPM | SYSTOLIC BLOOD PRESSURE: 118 MMHG | RESPIRATION RATE: 16 BRPM | OXYGEN SATURATION: 96 % | WEIGHT: 243.1 LBS | HEIGHT: 72 IN | TEMPERATURE: 98 F

## 2019-07-10 DIAGNOSIS — C79.51 MALIGNANT NEOPLASM OF PROSTATE METASTATIC TO BONE (H): Primary | ICD-10-CM

## 2019-07-10 DIAGNOSIS — C61 MALIGNANT NEOPLASM OF PROSTATE METASTATIC TO BONE (H): Primary | ICD-10-CM

## 2019-07-10 DIAGNOSIS — C79.51 MALIGNANT NEOPLASM OF PROSTATE METASTATIC TO BONE (H): ICD-10-CM

## 2019-07-10 DIAGNOSIS — C61 MALIGNANT NEOPLASM OF PROSTATE METASTATIC TO BONE (H): ICD-10-CM

## 2019-07-10 PROBLEM — I10 ESSENTIAL HYPERTENSION: Status: ACTIVE | Noted: 2019-04-30

## 2019-07-10 PROBLEM — R97.20 ELEVATED PSA: Status: ACTIVE | Noted: 2018-12-14

## 2019-07-10 LAB
ALBUMIN SERPL-MCNC: 3.9 G/DL (ref 3.4–5)
ALP SERPL-CCNC: 66 U/L (ref 40–150)
ALT SERPL W P-5'-P-CCNC: 27 U/L (ref 0–70)
ANION GAP SERPL CALCULATED.3IONS-SCNC: 7 MMOL/L (ref 3–14)
AST SERPL W P-5'-P-CCNC: 20 U/L (ref 0–45)
BASOPHILS # BLD AUTO: 0 10E9/L (ref 0–0.2)
BASOPHILS NFR BLD AUTO: 0.1 %
BILIRUB SERPL-MCNC: 0.4 MG/DL (ref 0.2–1.3)
BUN SERPL-MCNC: 18 MG/DL (ref 7–30)
CALCIUM SERPL-MCNC: 9.3 MG/DL (ref 8.5–10.1)
CHLORIDE SERPL-SCNC: 108 MMOL/L (ref 94–109)
CO2 SERPL-SCNC: 24 MMOL/L (ref 20–32)
CREAT SERPL-MCNC: 1.11 MG/DL (ref 0.66–1.25)
DIFFERENTIAL METHOD BLD: ABNORMAL
EOSINOPHIL # BLD AUTO: 0 10E9/L (ref 0–0.7)
EOSINOPHIL NFR BLD AUTO: 0 %
ERYTHROCYTE [DISTWIDTH] IN BLOOD BY AUTOMATED COUNT: 15 % (ref 10–15)
GFR SERPL CREATININE-BSD FRML MDRD: 73 ML/MIN/{1.73_M2}
GLUCOSE SERPL-MCNC: 124 MG/DL (ref 70–99)
HCT VFR BLD AUTO: 36.7 % (ref 40–53)
HGB BLD-MCNC: 12.1 G/DL (ref 13.3–17.7)
IMM GRANULOCYTES # BLD: 0 10E9/L (ref 0–0.4)
IMM GRANULOCYTES NFR BLD: 0.4 %
LDH SERPL L TO P-CCNC: 218 U/L (ref 85–227)
LYMPHOCYTES # BLD AUTO: 1 10E9/L (ref 0.8–5.3)
LYMPHOCYTES NFR BLD AUTO: 10.2 %
MCH RBC QN AUTO: 30.3 PG (ref 26.5–33)
MCHC RBC AUTO-ENTMCNC: 33 G/DL (ref 31.5–36.5)
MCV RBC AUTO: 92 FL (ref 78–100)
MONOCYTES # BLD AUTO: 0.2 10E9/L (ref 0–1.3)
MONOCYTES NFR BLD AUTO: 1.8 %
NEUTROPHILS # BLD AUTO: 8.6 10E9/L (ref 1.6–8.3)
NEUTROPHILS NFR BLD AUTO: 87.5 %
NRBC # BLD AUTO: 0 10*3/UL
NRBC BLD AUTO-RTO: 0 /100
PLATELET # BLD AUTO: 259 10E9/L (ref 150–450)
POTASSIUM SERPL-SCNC: 4.1 MMOL/L (ref 3.4–5.3)
PROT SERPL-MCNC: 7.4 G/DL (ref 6.8–8.8)
PSA SERPL-MCNC: 0.82 UG/L (ref 0–4)
RBC # BLD AUTO: 3.99 10E12/L (ref 4.4–5.9)
SODIUM SERPL-SCNC: 139 MMOL/L (ref 133–144)
WBC # BLD AUTO: 9.9 10E9/L (ref 4–11)

## 2019-07-10 PROCEDURE — 25000128 H RX IP 250 OP 636: Mod: ZF | Performed by: INTERNAL MEDICINE

## 2019-07-10 PROCEDURE — 84153 ASSAY OF PSA TOTAL: CPT | Performed by: INTERNAL MEDICINE

## 2019-07-10 PROCEDURE — 25800030 ZZH RX IP 258 OP 636: Mod: ZF | Performed by: INTERNAL MEDICINE

## 2019-07-10 PROCEDURE — 99215 OFFICE O/P EST HI 40 MIN: CPT | Mod: GC | Performed by: INTERNAL MEDICINE

## 2019-07-10 PROCEDURE — 96367 TX/PROPH/DG ADDL SEQ IV INF: CPT

## 2019-07-10 PROCEDURE — 84403 ASSAY OF TOTAL TESTOSTERONE: CPT | Performed by: INTERNAL MEDICINE

## 2019-07-10 PROCEDURE — 83615 LACTATE (LD) (LDH) ENZYME: CPT | Performed by: INTERNAL MEDICINE

## 2019-07-10 PROCEDURE — 86316 IMMUNOASSAY TUMOR OTHER: CPT | Performed by: INTERNAL MEDICINE

## 2019-07-10 PROCEDURE — 96413 CHEMO IV INFUSION 1 HR: CPT

## 2019-07-10 PROCEDURE — G0463 HOSPITAL OUTPT CLINIC VISIT: HCPCS | Mod: ZF

## 2019-07-10 PROCEDURE — 85025 COMPLETE CBC W/AUTO DIFF WBC: CPT | Performed by: INTERNAL MEDICINE

## 2019-07-10 PROCEDURE — 80053 COMPREHEN METABOLIC PANEL: CPT | Performed by: INTERNAL MEDICINE

## 2019-07-10 RX ORDER — HEPARIN SODIUM (PORCINE) LOCK FLUSH IV SOLN 100 UNIT/ML 100 UNIT/ML
500 SOLUTION INTRAVENOUS EVERY 8 HOURS
Status: CANCELLED
Start: 2019-07-10

## 2019-07-10 RX ORDER — HEPARIN SODIUM (PORCINE) LOCK FLUSH IV SOLN 100 UNIT/ML 100 UNIT/ML
5 SOLUTION INTRAVENOUS ONCE
Status: COMPLETED | OUTPATIENT
Start: 2019-07-10 | End: 2019-07-10

## 2019-07-10 RX ORDER — DIPHENHYDRAMINE HYDROCHLORIDE 50 MG/ML
50 INJECTION INTRAMUSCULAR; INTRAVENOUS
Status: CANCELLED
Start: 2019-07-10

## 2019-07-10 RX ORDER — EPINEPHRINE 0.3 MG/.3ML
0.3 INJECTION SUBCUTANEOUS EVERY 5 MIN PRN
Status: CANCELLED | OUTPATIENT
Start: 2019-07-10

## 2019-07-10 RX ORDER — ALBUTEROL SULFATE 0.83 MG/ML
2.5 SOLUTION RESPIRATORY (INHALATION)
Status: CANCELLED | OUTPATIENT
Start: 2019-07-10

## 2019-07-10 RX ORDER — EPINEPHRINE 1 MG/ML
0.3 INJECTION, SOLUTION INTRAMUSCULAR; SUBCUTANEOUS EVERY 5 MIN PRN
Status: CANCELLED | OUTPATIENT
Start: 2019-07-10

## 2019-07-10 RX ORDER — DIPHENHYDRAMINE HYDROCHLORIDE 50 MG/ML
50 INJECTION INTRAMUSCULAR; INTRAVENOUS ONCE
Status: CANCELLED
Start: 2019-07-10

## 2019-07-10 RX ORDER — ALBUTEROL SULFATE 90 UG/1
1-2 AEROSOL, METERED RESPIRATORY (INHALATION)
Status: CANCELLED
Start: 2019-07-10

## 2019-07-10 RX ORDER — SODIUM CHLORIDE 9 MG/ML
1000 INJECTION, SOLUTION INTRAVENOUS CONTINUOUS PRN
Status: CANCELLED
Start: 2019-07-10

## 2019-07-10 RX ORDER — MEPERIDINE HYDROCHLORIDE 25 MG/ML
25 INJECTION INTRAMUSCULAR; INTRAVENOUS; SUBCUTANEOUS EVERY 30 MIN PRN
Status: CANCELLED | OUTPATIENT
Start: 2019-07-10

## 2019-07-10 RX ORDER — METHYLPREDNISOLONE SODIUM SUCCINATE 125 MG/2ML
125 INJECTION, POWDER, LYOPHILIZED, FOR SOLUTION INTRAMUSCULAR; INTRAVENOUS
Status: CANCELLED
Start: 2019-07-10

## 2019-07-10 RX ORDER — LORAZEPAM 2 MG/ML
0.5 INJECTION INTRAMUSCULAR EVERY 4 HOURS PRN
Status: CANCELLED
Start: 2019-07-10

## 2019-07-10 RX ORDER — HEPARIN SODIUM (PORCINE) LOCK FLUSH IV SOLN 100 UNIT/ML 100 UNIT/ML
500 SOLUTION INTRAVENOUS EVERY 8 HOURS
Status: DISCONTINUED | OUTPATIENT
Start: 2019-07-10 | End: 2019-07-10 | Stop reason: HOSPADM

## 2019-07-10 RX ADMIN — HEPARIN 5 ML: 100 SYRINGE at 13:25

## 2019-07-10 RX ADMIN — SODIUM CHLORIDE 250 ML: 9 INJECTION, SOLUTION INTRAVENOUS at 15:42

## 2019-07-10 RX ADMIN — DIPHENHYDRAMINE HYDROCHLORIDE: 50 INJECTION INTRAMUSCULAR; INTRAVENOUS at 15:42

## 2019-07-10 RX ADMIN — DEXAMETHASONE SODIUM PHOSPHATE 12 MG: 10 INJECTION, SOLUTION INTRAMUSCULAR; INTRAVENOUS at 16:03

## 2019-07-10 RX ADMIN — DOCETAXEL 180 MG: 80 INJECTION, SOLUTION, CONCENTRATE INTRAVENOUS at 16:37

## 2019-07-10 RX ADMIN — HEPARIN 500 UNITS: 100 SYRINGE at 17:41

## 2019-07-10 ASSESSMENT — PAIN SCALES - GENERAL: PAINLEVEL: NO PAIN (0)

## 2019-07-10 ASSESSMENT — MIFFLIN-ST. JEOR: SCORE: 1965.69

## 2019-07-10 NOTE — PROGRESS NOTES
Infusion Nursing Note:  Walter Booth Dannyhi presents today for C4D1 Taxotere.    Patient seen by provider today: Yes: Dr. Smalls    Note: Patient reports to tolerating Taxotere well and has no new concerns today.  Reports to taking his Dexamethasone as prescribed.    Intravenous Access:  Implanted Port.      Treatment Conditions:  Lab Results   Component Value Date    HGB 12.1 07/10/2019     Lab Results   Component Value Date    WBC 9.9 07/10/2019      Lab Results   Component Value Date    ANEU 8.6 07/10/2019     Lab Results   Component Value Date     07/10/2019      Lab Results   Component Value Date     07/10/2019                   Lab Results   Component Value Date    POTASSIUM 4.1 07/10/2019           No results found for: MAG         Lab Results   Component Value Date    CR 1.11 07/10/2019                   Lab Results   Component Value Date    BETHANY 9.3 07/10/2019                Lab Results   Component Value Date    BILITOTAL 0.4 07/10/2019           Lab Results   Component Value Date    ALBUMIN 3.9 07/10/2019                    Lab Results   Component Value Date    ALT 27 07/10/2019           Lab Results   Component Value Date    AST 20 07/10/2019       Results reviewed, labs MET treatment parameters, ok to proceed with treatment.      Post Infusion Assessment:  Patient tolerated infusion without incident.  Blood return noted pre and post infusion.  Site patent and intact, free from redness, edema or discomfort.  No evidence of extravasations.  Access discontinued per protocol.    Discharge Plan:   Prescription refills given for Dexamethasone.  Discharge instructions reviewed with: Patient and Family.  Patient and/or family verbalized understanding of discharge instructions and all questions answered.  Copy of AVS reviewed with patient and/or family.  Future appointments still pending at time of patient discharge.  Scheduling to call patient with updated schedule.  Patient will also look on  Sarah for scheduling updates.  Patient discharged in stable condition accompanied by: wife and son.  Departure Mode: Ambulatory.    Heather Beltran RN

## 2019-07-10 NOTE — PROGRESS NOTES
MEDICAL ONCOLOGY FOLLOW-UP NOTE    REFERRING PROVIDER: Jorge Alberto Yepez MD at [facility].    REASON FOR VISIT: Evaluation before cycle 4 of docetaxel + ADT for metastatic hormone-sensitive prostate cancer.    HISTORY OF PRESENT ILLNESS: Mr. 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 His oncologic history is detailed below. He is being treated with chemo-hormonal therapy for metastatic prostate cancer, and comes in today for routine follow up prior to cycle 4 docetaxel.    INTERVAL HISTORY:  Walter is doing OK today. He is accompanied by his wife and son. He continues to note numbness in the teeth/gums, bt this is not too bothersome. He also had some tongue swelling during the first infusion, but this did not occur during subsequent infusions. He has some nausea after dex is completed, but well controlled with Compazine. Denies any vomiting. Mild dysgeusia for about 4-5 days. Mild mucositis pain was improved after starting salt/soda rinses preventatively. He denies any joint pain with this cycle. He does have occasional pain in the right upper back and mid-chest to left of sternum at times. Pain is mild and infrequent and has not needed any tylenol. He has not had any additional hair loss. He denies any low-grade temps or fevers. Energy level is a little worse with this last cycle, but overall still maintaining an active life. Slightly worse neuropathy involving the toes bilaterally, with a little pain when he bends the toes. He has chronic right foot numbness for the past 2 years, which is unchanged with chemotherapy. No hematuria, SOB/cough, or rashes. Chronic obstructive urinary symptoms unchanged. Wants to continue with docetaxel chemotherapy. No signs/symptoms concerning for disease progression.    Recently started a new  "diet, working on cutting out processed foods and eating less meat. Has dropped 5 lbs intentionally since the last visit. Also inquiring about starting several supplements which are recommended in a book about cancer diets, noting he wants to make sure these would be OK given that he has only one kidney and is on chemotherapy.    ONCOLOGIC HISTORY:  1. Prostate adenocarcinoma, stage IV (M1b at diagnosis), high-volume, castration-sensitive:  - 12/13/2018: PSA found to be elevated to 9.1 ng/mL on a routine follow-up with primary care provider Dr. Naylor at [facility]. Prior PSA were 1.4 on 8/16/17, 2.4 on 6/28/16, 2.9 on 6/28/16, and as low as 0.4 on 3/26/2003.   - 1/08/2019: Consultation with Sarah Wilson CNP in Urology clinic. Repeat PSA 9.6.  - 1/16/2019: MRI prostate with contrast - \"This examination is characterized as PIRADS 5- very high probability. Clinically significant cancer is highly likely to be present. There is a large, invasive mass arising from the right peripheral zone and extending into the neurovascular bundle, seminal vesicle, and along the anterolateral right mesorectal fascia. Metastatic right external iliac lymph node. Metastatic lesion in the posterior/superior right acetabulum.\"  - 1/25/2019: CT abdomen and pelvis with IV contrast - \"1. Heterogeneous enhancing mass posteriorly in the upper pole of the right kidney measures 4.5 x 5.8 x 5.7 cm (AP by transverse by craniocaudal). It has a small nodular component extending posterior medially which abuts the right psoas muscle. This nodular extension measures 2.1 x 2.1 cm. Minimal stranding about the mass. No definite thrombus within the right renal vein. This renal mass is compatible with a renal cell carcinoma. A paraaortic lymph node situated immediately posterior to the left renal vein measures 1.1 cm in short axis, suspicious for metastasis. 2. A 2 cm right external iliac lymph node is also suspicious for metastases. 3. Multiple " "sclerotic osseous lesions suspicious for metastases. These include 0.8 and 0.6 cm sclerotic lesions laterally in the right iliac wing (images 53 and 62 respectively). Ill-defined groundglass density laterally in the right acetabulum measuring 1.7 x 1.2 cm corresponds with the lesion identified on MRI. A 0.4 cm groundglass density in the left acetabulum. Sclerotic lesion in the left femoral neck measures 0.9 x 1.6 cm (image 81). Sclerotic metastases would be more compatible with prostate metastases. 4. A 3 subcentimeter hepatic lesions are indeterminate. Metastases would be a consideration. These can be further characterized with liver MRI.\"  - 1/25/2019: NM bone scan - \"There is focal bony uptake in the left femoral neck, right acetabulum, right of midline at the S1 or L5 level of the spine, within multiple bilateral ribs, in the C7 or T1 level of the spine, and anteriorly within the skull. Findings are suspicious for metastases.\"  - 1/31/19: CT chest with contrast - \" No suspicious nodules in the chest.  Stable appearance of a 5.8 cm right renal mass concerning for renal carcinoma until proven otherwise.  Stable indeterminant subcentimeter hypodensities in the liver.  Multifocal osteoblastic metastasis including 2.5 cm lesion in the right fifth rib posteriorly and a 1.6 cm lesion in the right third rib posteriorly. No lytic lesions identified.\"  - 2/6/19: CT-guided right sclerotic fifth rib lesion biopsy - \"Metastatic carcinoma, consistent with prostate primary.  Immunohistochemical stains performed show the metastatic carcinoma stains positive for NKX3.1 (prostate marker), negative for STACIE 3 and PAX8, which supports the above diagnosis.\"  - 2/15/19: Started Casodex 50mg every day and consented for the biobanking protocol. 3/18/19 - stopped Casodex.  - 2/19/19: Case discussed in tumor board - recommendation for nephectomy for suspected malignant right renal mass.   - 02/26/19: Started Lupron 22.5mg every 3 months. " "05/29/19 - cycle 2.   - 5/8/19: Started Docetaxel 75mg/m2 IV every 3 weeks. 06/18/19 - cycle 3.      2. Stage III ([id number]), grade 3 of 4, clear-cell RCC of right kidney:  - Incidentally diagnosed as above.   - Underwent curative-intent robotic right radical nephrectomy with Dr. Wesley Cleveland on 3/19/19. No tumor spillage per op report.   - Path showed: \"Histologic Type: Clear cell renal cell carcinoma; Sarcomatoid Features: Not identified; Histologic Grade: Nucleolar grade 3 (WHO/ISUP). Extent Tumor Size: 4.3 cm. Microscopic Tumor Extension: Tumor extension into renal sinus (in vascular structures). Margins: Negative. Tumor Necrosis: Present; focal. Lymph-Vascular Invasion: Not identified.  Pathologic Staging (pTNM) Primary Tumor (pT): pT3a: Tumor extends into renal vein branches/renal sinus. Regional Lymph Nodes (pN): pNX. Number of Lymph Nodes Examined: 0 Distant Metastasis (pM): pM N/A.\"    PAST MEDICAL HISTORY:  Past Medical History:   Diagnosis Date     Cancer of kidney, right (H)      Complication of anesthesia     slow wakeup      Malignant neoplasm of prostate metastatic to bone (H) 2/14/2019     Thyroid disease      PAST SURGICAL HISTORY:   Past Surgical History:   Procedure Laterality Date     CYSTOSCOPY      about 10 years ago     INSERT PORT VASCULAR ACCESS Right 5/2/2019    Procedure: Chest Port Placement;  Surgeon: Tate Burciaga PA-C;  Location:  OR     IR CHEST PORT PLACEMENT > 5 YRS OF AGE  5/2/2019     LAPAROSCOPIC NEPHRECTOMY Right 3/19/2019    Procedure: Right laparoscopic radical nephrectomy;  Surgeon: Wesley Cleveland MD;  Location:  OR      SOCIAL HISTORY:   Social History     Tobacco Use     Smoking status: Never Smoker     Smokeless tobacco: Never Used   Substance Use Topics     Alcohol use: None     Comment: wine - very rare     Drug use: No     FAMILY HISTORY:   Family History   Problem Relation Age of Onset     Diabetes Father      ALLERGIES:   Allergies "   Allergen Reactions     Doxycycline GI Disturbance     CURRENT MEDICATIONS:   Current Outpatient Medications:      amLODIPine (NORVASC) 10 MG tablet, Take 1 tablet (10 mg) by mouth daily, Disp: 30 tablet, Rfl: 0     atorvastatin (LIPITOR) 20 MG tablet, Take 20 mg by mouth daily, Disp: , Rfl:      calcium citrate-vitamin D (CITRACAL) 315-250 MG-UNIT TABS per tablet, Take 650 mg by mouth 2 times daily , Disp: , Rfl:      cetirizine (ZYRTEC) 10 MG tablet, Take 10 mg by mouth as needed, Disp: , Rfl:      cinnamon 500 MG CAPS, Take 500 mg by mouth daily , Disp: , Rfl:      cycloSPORINE (RESTASIS) 0.05 % ophthalmic emulsion, Place 1 drop into both eyes 2 times daily , Disp: , Rfl:      dexamethasone (DECADRON) 4 MG tablet, Take 8 mg by mouth 2 times daily Evening PRIOR and continue morning of Docetaxel, then 4 additional doses.., Disp: 12 tablet, Rfl: 5     fluticasone (FLONASE) 50 MCG/ACT nasal spray, Spray 2 sprays in nostril daily as needed , Disp: , Rfl:      Glucosamine-Chondroit-Vit C-Mn (GLUCOSAMINE 1500 COMPLEX) CAPS, Take 1 capsule by mouth daily, Disp: , Rfl:      levothyroxine (SYNTHROID/LEVOTHROID) 125 MCG tablet, Take 125 mcg by mouth daily, Disp: , Rfl:      Multiple Vitamins-Minerals (MULTIVITAMIN ADULT EXTRA C PO), Take 1 tablet by mouth every 24 hours, Disp: , Rfl:      Nutritional Supplements (SALMON OIL) CAPS, Take 1 capsule by mouth daily, Disp: , Rfl:      omeprazole (PRILOSEC) 20 MG DR capsule, Take 20 mg by mouth daily, Disp: , Rfl:      prochlorperazine (COMPAZINE) 10 MG tablet, Take 1 tablet (10 mg) by mouth every 6 hours as needed (Nausea/Vomiting), Disp: 30 tablet, Rfl: 5  No current facility-administered medications for this visit.     Facility-Administered Medications Ordered in Other Visits:      DOCEtaxel (TAXOTERE) 180 mg in sodium chloride 0.9 % 284 mL CHEMOTHERAPY, 180 mg, Intravenous, Once, Uday Smalls MD     heparin 100 UNIT/ML injection 500 Units, 500 Units, Intravenous, Q8H, Slim  MD Uday     sodium chloride (PF) 0.9% PF flush 10 mL, 10 mL, Intravenous, Once, Uday Smalls MD    PHYSICAL EXAMINATION:  VITALS: /74 (BP Location: Right arm, Patient Position: Sitting, Cuff Size: Adult Large)   Pulse 67   Temp 98  F (36.7  C) (Oral)   Resp 16   Ht 1.829 m (6')   Wt 110.3 kg (243 lb 1.6 oz)   SpO2 96%   BMI 32.97 kg/m     GENERAL: The patient is a pleasant male in no acute distress.HEENT: EOMI, PERRL. Sclerae are anicteric. Oral mucosa is pink and moist. Gingiva and buccal mucosa with mild edema but no lesions or thrush.   Lymph: Neck is supple with no lymphadenopathy in the cervical or supraclavicular areas.   Heart: Regular rate and rhythm.   Lungs: Clear to auscultation bilaterally.   Abdomen: Bowel sounds present, soft, nontender with no palpable hepatosplenomegaly or masses.   Extremities: No lower extremity edema noted bilaterally.   Neuro: Alert and fully oriented, no focal deficits.  Skin: No rashes, petechiae, or bruising noted on exposed skin.  LABORATORY & IMAGING STUDIES:  Recent Labs   Lab Test 07/10/19  1332 06/18/19  1005 05/29/19  1217   WBC 9.9 11.8* 12.3*   RBC 3.99* 4.14* 4.28*   HGB 12.1* 12.4* 12.6*   HCT 36.7* 36.7* 36.7*   MCV 92 89 86   MCH 30.3 30.0 29.4   MCHC 33.0 33.8 34.3   RDW 15.0 15.4* 14.4    233 215   NEUTROPHIL 87.5 89.3 86.1    140 139   POTASSIUM 4.1 3.8 4.0   CHLORIDE 108 107 105   CO2 24 24 25   ANIONGAP 7 10 8   * 136* 123*   BUN 18 21 17   CR 1.11 1.04 1.04   BETHANY 9.3 9.3 9.9   PROTTOTAL 7.4 7.6 7.8   ALBUMIN 3.9 3.7 3.8   BILITOTAL 0.4 0.5 0.4   ALKPHOS 66 62 67   AST 20 20 28   ALT 27 30 50     Lab Test 07/10/19  1332 06/18/19  1005 05/29/19  1217 04/30/19  1627 03/26/19  1604 02/26/19  1425 02/15/19  1044   PSA 0.82 0.80 1.18 0.69 1.25 8.00* 12.30*   CGAB  --  646* 659* 1,309* 900* 654* 475*   TESTOSTTOTAL  --   --  <2* 7* 22* 462 222*   CGAB - Chromogranin A; TESTOSTTOTAL: Total testosterone.    Lab Test 07/10/19  1332  06/18/19  1005 05/29/19  1217    220 232*     CT CHEST/ABDOMEN/PELVIS W CONTRAST 7/2/2019 8:16 AM  COMPARISON: 5/3/2019.  FINDINGS: Chest: A hiatal hernia is again demonstrated. Sclerotic lesions involving C7 and T6 are less apparent today. Abdomen: Two subcentimeter liver lesions seen on axial images 53 and 59 haven't significantly changed from 1/25/2019. This stability is supportive evidence for a benign entity. The right kidney is surgically absent. Previously suspected postop change in the right surgical bed is less impressive today.  A small sclerotic focus involving L4 is unchanged. Pelvis: A small fat-containing right inguinal hernia is again seen.  Sclerotic foci involving the bony pelvis and left femoral neck are similar to the prior study. IMPRESSION:  Osseous lesions are stable to slightly less impressive compared to the prior study. No new findings.    NUCLEAR MEDICINE BONE SCAN WHOLE BODY  7/2/2019 10:53 AM  COMPARISON: bone scan: 5/3/2019   FINDINGS: There are multiple foci of abnormal increased activity involving the calvarium, ribs, left proximal femur, and cervicothoracic junction compatible with osseous metastasis. The distribution appears to be similar if not unchanged. Normal left renal activity is noted. No right renal activity is visible compatible with nephrectomy. IMPRESSION: 1. Multiple osseous metastasis, similar if not unchanged in  distribution compared to 5/3/2019. 2. Right nephrectomy.    ASSESSMENT & PLAN: Mr. Reyes is a delightful 59 year old gentleman with recently diagnosed metastatic castration sensitive prostate adenocarcinoma as well as an incidentally diagnosed stage III clear-cell renal cell carcinoma of right kidney (s/p radical R nephrectomy 3/19/19), who is here for a follow-up visit while undergoing docetaxel + ADT for prostate cancer.      1. Metastatic prostate cancer, with involvement of the bones and RPLN:   - Patient meets the criteria for CHAARTED  "\"high-volume\" metastatic hormone-sensitive prostate cancer.  - There were no available clinical trials for this gentleman at the time of diagnosis, especially given concurrent malignancy.  He was enrolled in the institutional biobanking study on 2/15/19 but this is a non-interventional study.   - After a review of systemic therapy options, patient opted to start docetaxel in combination with ADT (per JOSEFA, [id number], KARLA).   - Reviewed restaging scan results from 7/2/19, that show likely ongoing partial response with \"slightly less impressive osseous lesions\" compared to the prior study in May 2019.  - Plan to continue with cycle 4 docetaxel today. Regimen will be docetaxel 75mg/m2 IV every 3 weeks for up to 6 cycles. No primary prophy G-CSF.  - Patient developed a grade 1 allergic reaction with cycle 1 (tongue swelling). Dose interrupted and given solumedrol and benadryl. RN successfully able to reinitiate docetaxel and patient tolerated remainder of infusion well. Added dex and benadryl premeds for all subsequent cycles.  - Risks/benefits of docetaxel including allergic reaction, fatigue, nausea/vomiting/constipation, myelosuppression, neuropathy, LFT changes etc previously discussed in detail.   - Encouraged use of salt-soda rinses to prevent and treat mucositis.  - Continue Lupron 22.5mg every 3 months - last dose on 5/29/19 (next due in late August 2019).  - He understands the risks and benefits of ADT including hot flashes, mood changes and long-term cardiovascular, bone health, etc. Also understands the risks of docetaxel treatment including fatigue, nausea, vomiting, diarrhea/constipation, hand-foot syndrome, allergic reactions, myelosuppression with increased risk of infection and bleeding etc.  - Will repeat restaging scans after an additional 3 cycles.   - Will also add antiresorptive therapy based on restaging results after completion of chemo.      2. Stage 3, UISS-high risk ccRCC:  - Patient " understands that he has a stage III, UISS high risk clear-cell renal cell carcinoma with approximately 40-50% risk of recurrence after definitive surgery.   - At this time, he has no clear evidence of residual disease.  The retroperitoneal lymphadenopathy is more consistent with metastatic prostate cancer - this will need to be watched.  - He understands the role of adjuvant therapies in RCC and that at best, the data for adjuvant TKI therapy is mixed and shows an improvement in disease-free survival and not overall survival.   - Continue active surveillance with self-reporting for signs/symptoms of recurrence (this was previously explained in detail) and periodic H&P and scans.      3. Genetics referral:  - Was referred and seen by Genetics on 3/7/19. No evidence of inherited cancer syndromes found on work-up.      4. Research participation:  - Enrolled in  biobanking study.    5. Diet:  - Patient and wife inquiring about dietary changes, particularly cutting out processed foods, dairy and less meat. Also wondering about supplements. Encouraged to eat healthy and exercise, but advised against cutting back significantly on caloric intake and urged to make sure he's still getting all of the necessary nutrients in his diet. He agrees to wait to start any supplements until after chemotherapy.    Return to clinic with labs prior to the next cycle of chemotherapy.    Patient was seen and discussed with attending physician Dr. Uday Smalls.    Holly Warren MD/PhD  Heme/Onc Fellow      ATTENDING ATTESTATION NOTE:  I saw the patient with Dr. Warren and discussed all pertinent clinical data including lab, imaging and path reports. The plan above was made under my supervision and accurately reflects the A/P after my examination and discussion with the patient.     BILLIN.     Uday Smalls M.D.  . Professor of Medicine  Genitourinary Oncology  Division of Hematology, Oncology & Transplantation  [facility]  [place]

## 2019-07-10 NOTE — NURSING NOTE
"Chief Complaint   Patient presents with     Port Draw     port accessed and labs drawn by rn.  vital signs taken.     Port accessed by RN in lab with 20g 3/4\" gripper needle, labs drawn, port flushed with saline and heparin, vitals checked, pt checked in for next appointment.  Ching Gunter RN    "

## 2019-07-10 NOTE — LETTER
7/10/2019       RE: Walter Reyes  [id number]  [facility] [address]     Dear Colleague,    Thank you for referring your patient, Walter Reyes, to the [facility]. Please see a copy of my visit note below.    MEDICAL ONCOLOGY FOLLOW-UP NOTE    REFERRING PROVIDER: Jorge Alberto Yepez MD at [facility].    REASON FOR VISIT: Evaluation before cycle 4 of docetaxel + ADT for metastatic hormone-sensitive prostate cancer.    HISTORY OF PRESENT ILLNESS: Mr. 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 His oncologic history is detailed below. He is being treated with chemo-hormonal therapy for metastatic prostate cancer, and comes in today for routine follow up prior to cycle 4 docetaxel.    INTERVAL HISTORY:  Walter is doing OK today. He is accompanied by his wife and son. He continues to note numbness in the teeth/gums, bt this is not too bothersome. He also had some tongue swelling during the first infusion, but this did not occur during subsequent infusions. He has some nausea after dex is completed, but well controlled with Compazine. Denies any vomiting. Mild dysgeusia for about 4-5 days. Mild mucositis pain was improved after starting salt/soda rinses preventatively. He denies any joint pain with this cycle. He does have occasional pain in the right upper back and mid-chest to left of sternum at times. Pain is mild and infrequent and has not needed any tylenol. He has not had any additional hair loss. He denies any low-grade temps or fevers. Energy level is a little worse with this last cycle, but overall still maintaining an active life. Slightly worse neuropathy involving the toes bilaterally, with a little pain when he bends the toes. He has chronic right foot numbness for the past  "2 years, which is unchanged with chemotherapy. No hematuria, SOB/cough, or rashes. Chronic obstructive urinary symptoms unchanged. Wants to continue with docetaxel chemotherapy. No signs/symptoms concerning for disease progression.    Recently started a new diet, working on cutting out processed foods and eating less meat. Has dropped 5 lbs intentionally since the last visit. Also inquiring about starting several supplements which are recommended in a book about cancer diets, noting he wants to make sure these would be OK given that he has only one kidney and is on chemotherapy.    ONCOLOGIC HISTORY:  1. Prostate adenocarcinoma, stage IV (M1b at diagnosis), high-volume, castration-sensitive:  - 12/13/2018: PSA found to be elevated to 9.1 ng/mL on a routine follow-up with primary care provider Dr. Naylor at [facility]. Prior PSA were 1.4 on 8/16/17, 2.4 on 6/28/16, 2.9 on 6/28/16, and as low as 0.4 on 3/26/2003.   - 1/08/2019: Consultation with Sarah Wilson CNP in Urology clinic. Repeat PSA 9.6.  - 1/16/2019: MRI prostate with contrast - \"This examination is characterized as PIRADS 5- very high probability. Clinically significant cancer is highly likely to be present. There is a large, invasive mass arising from the right peripheral zone and extending into the neurovascular bundle, seminal vesicle, and along the anterolateral right mesorectal fascia. Metastatic right external iliac lymph node. Metastatic lesion in the posterior/superior right acetabulum.\"  - 1/25/2019: CT abdomen and pelvis with IV contrast - \"1. Heterogeneous enhancing mass posteriorly in the upper pole of the right kidney measures 4.5 x 5.8 x 5.7 cm (AP by transverse by craniocaudal). It has a small nodular component extending posterior medially which abuts the right psoas muscle. This nodular extension measures 2.1 x 2.1 cm. Minimal stranding about the mass. No definite thrombus within the right renal vein. This renal mass is compatible " "with a renal cell carcinoma. A paraaortic lymph node situated immediately posterior to the left renal vein measures 1.1 cm in short axis, suspicious for metastasis. 2. A 2 cm right external iliac lymph node is also suspicious for metastases. 3. Multiple sclerotic osseous lesions suspicious for metastases. These include 0.8 and 0.6 cm sclerotic lesions laterally in the right iliac wing (images 53 and 62 respectively). Ill-defined groundglass density laterally in the right acetabulum measuring 1.7 x 1.2 cm corresponds with the lesion identified on MRI. A 0.4 cm groundglass density in the left acetabulum. Sclerotic lesion in the left femoral neck measures 0.9 x 1.6 cm (image 81). Sclerotic metastases would be more compatible with prostate metastases. 4. A 3 subcentimeter hepatic lesions are indeterminate. Metastases would be a consideration. These can be further characterized with liver MRI.\"  - 1/25/2019: NM bone scan - \"There is focal bony uptake in the left femoral neck, right acetabulum, right of midline at the S1 or L5 level of the spine, within multiple bilateral ribs, in the C7 or T1 level of the spine, and anteriorly within the skull. Findings are suspicious for metastases.\"  - 1/31/19: CT chest with contrast - \" No suspicious nodules in the chest.  Stable appearance of a 5.8 cm right renal mass concerning for renal carcinoma until proven otherwise.  Stable indeterminant subcentimeter hypodensities in the liver.  Multifocal osteoblastic metastasis including 2.5 cm lesion in the right fifth rib posteriorly and a 1.6 cm lesion in the right third rib posteriorly. No lytic lesions identified.\"  - 2/6/19: CT-guided right sclerotic fifth rib lesion biopsy - \"Metastatic carcinoma, consistent with prostate primary.  Immunohistochemical stains performed show the metastatic carcinoma stains positive for NKX3.1 (prostate marker), negative for STACIE 3 and PAX8, which supports the above diagnosis.\"  - 2/15/19: Started " "Casodex 50mg every day and consented for the biobanking protocol. 3/18/19 - stopped Casodex.  - 2/19/19: Case discussed in tumor board - recommendation for nephectomy for suspected malignant right renal mass.   - 02/26/19: Started Lupron 22.5mg every 3 months. 05/29/19 - cycle 2.   - 5/8/19: Started Docetaxel 75mg/m2 IV every 3 weeks.  06/18/19 - cycle 3.      2. Stage III ([id number]), grade 3 of 4, clear-cell RCC of right kidney:  - Incidentally diagnosed as above.   - Underwent curative-intent robotic right radical nephrectomy with Dr. Wesley Cleveland on 3/19/19. No tumor spillage per op report.   - Path showed: \"Histologic Type: Clear cell renal cell carcinoma; Sarcomatoid Features: Not identified; Histologic Grade: Nucleolar grade 3 (WHO/ISUP). Extent Tumor Size: 4.3 cm. Microscopic Tumor Extension: Tumor extension into renal sinus (in vascular structures). Margins: Negative. Tumor Necrosis: Present; focal. Lymph-Vascular Invasion: Not identified.  Pathologic Staging (pTNM) Primary Tumor (pT): pT3a: Tumor extends into renal vein branches/renal sinus. Regional Lymph Nodes (pN): pNX. Number of Lymph Nodes Examined: 0 Distant Metastasis (pM): pM N/A.\"    PAST MEDICAL HISTORY:  Past Medical History:   Diagnosis Date     Cancer of kidney, right (H)      Complication of anesthesia     slow wakeup      Malignant neoplasm of prostate metastatic to bone (H) 2/14/2019     Thyroid disease      PAST SURGICAL HISTORY:   Past Surgical History:   Procedure Laterality Date     CYSTOSCOPY      about 10 years ago     INSERT PORT VASCULAR ACCESS Right 5/2/2019    Procedure: Chest Port Placement;  Surgeon: Tate Burciaga PA-C;  Location: [facility] OR     IR CHEST PORT PLACEMENT > 5 YRS OF AGE  5/2/2019     LAPAROSCOPIC NEPHRECTOMY Right 3/19/2019    Procedure: Right laparoscopic radical nephrectomy;  Surgeon: Wesley Cleveland MD;  Location:  OR      SOCIAL HISTORY:   Social History     Tobacco Use     Smoking status: " Never Smoker     Smokeless tobacco: Never Used   Substance Use Topics     Alcohol use: None     Comment: wine - very rare     Drug use: No     FAMILY HISTORY:   Family History   Problem Relation Age of Onset     Diabetes Father      ALLERGIES:   Allergies   Allergen Reactions     Doxycycline GI Disturbance     CURRENT MEDICATIONS:   Current Outpatient Medications:      amLODIPine (NORVASC) 10 MG tablet, Take 1 tablet (10 mg) by mouth daily, Disp: 30 tablet, Rfl: 0     atorvastatin (LIPITOR) 20 MG tablet, Take 20 mg by mouth daily, Disp: , Rfl:      calcium citrate-vitamin D (CITRACAL) 315-250 MG-UNIT TABS per tablet, Take 650 mg by mouth 2 times daily , Disp: , Rfl:      cetirizine (ZYRTEC) 10 MG tablet, Take 10 mg by mouth as needed, Disp: , Rfl:      cinnamon 500 MG CAPS, Take 500 mg by mouth daily , Disp: , Rfl:      cycloSPORINE (RESTASIS) 0.05 % ophthalmic emulsion, Place 1 drop into both eyes 2 times daily , Disp: , Rfl:      dexamethasone (DECADRON) 4 MG tablet, Take 8 mg by mouth 2 times daily Evening PRIOR and continue morning of Docetaxel, then 4 additional doses.., Disp: 12 tablet, Rfl: 5     fluticasone (FLONASE) 50 MCG/ACT nasal spray, Spray 2 sprays in nostril daily as needed , Disp: , Rfl:      Glucosamine-Chondroit-Vit C-Mn (GLUCOSAMINE 1500 COMPLEX) CAPS, Take 1 capsule by mouth daily, Disp: , Rfl:      levothyroxine (SYNTHROID/LEVOTHROID) 125 MCG tablet, Take 125 mcg by mouth daily, Disp: , Rfl:      Multiple Vitamins-Minerals (MULTIVITAMIN ADULT EXTRA C PO), Take 1 tablet by mouth every 24 hours, Disp: , Rfl:      Nutritional Supplements (SALMON OIL) CAPS, Take 1 capsule by mouth daily, Disp: , Rfl:      omeprazole (PRILOSEC) 20 MG DR capsule, Take 20 mg by mouth daily, Disp: , Rfl:      prochlorperazine (COMPAZINE) 10 MG tablet, Take 1 tablet (10 mg) by mouth every 6 hours as needed (Nausea/Vomiting), Disp: 30 tablet, Rfl: 5  No current facility-administered medications for this visit.      Facility-Administered Medications Ordered in Other Visits:      DOCEtaxel (TAXOTERE) 180 mg in sodium chloride 0.9 % 284 mL CHEMOTHERAPY, 180 mg, Intravenous, Once, Uday Smalls MD     heparin 100 UNIT/ML injection 500 Units, 500 Units, Intravenous, Q8H, Uday Smalls MD     sodium chloride (PF) 0.9% PF flush 10 mL, 10 mL, Intravenous, Once, Uday Smalls MD    PHYSICAL EXAMINATION:  VITALS: /74 (BP Location: Right arm, Patient Position: Sitting, Cuff Size: Adult Large)   Pulse 67   Temp 98  F (36.7  C) (Oral)   Resp 16   Ht 1.829 m (6')   Wt 110.3 kg (243 lb 1.6 oz)   SpO2 96%   BMI 32.97 kg/m      GENERAL: The patient is a pleasant male in no acute distress.HEENT: EOMI, PERRL. Sclerae are anicteric. Oral mucosa is pink and moist. Gingiva and buccal mucosa with mild edema but no lesions or thrush.   Lymph: Neck is supple with no lymphadenopathy in the cervical or supraclavicular areas.   Heart: Regular rate and rhythm.   Lungs: Clear to auscultation bilaterally.   Abdomen: Bowel sounds present, soft, nontender with no palpable hepatosplenomegaly or masses.   Extremities: No lower extremity edema noted bilaterally.   Neuro: Alert and fully oriented, no focal deficits.  Skin: No rashes, petechiae, or bruising noted on exposed skin.  LABORATORY & IMAGING STUDIES:  Recent Labs   Lab Test 07/10/19  1332 06/18/19  1005 05/29/19  1217   WBC 9.9 11.8* 12.3*   RBC 3.99* 4.14* 4.28*   HGB 12.1* 12.4* 12.6*   HCT 36.7* 36.7* 36.7*   MCV 92 89 86   MCH 30.3 30.0 29.4   MCHC 33.0 33.8 34.3   RDW 15.0 15.4* 14.4    233 215   NEUTROPHIL 87.5 89.3 86.1    140 139   POTASSIUM 4.1 3.8 4.0   CHLORIDE 108 107 105   CO2 24 24 25   ANIONGAP 7 10 8   * 136* 123*   BUN 18 21 17   CR 1.11 1.04 1.04   BETHANY 9.3 9.3 9.9   PROTTOTAL 7.4 7.6 7.8   ALBUMIN 3.9 3.7 3.8   BILITOTAL 0.4 0.5 0.4   ALKPHOS 66 62 67   AST 20 20 28   ALT 27 30 50     Lab Test 07/10/19  1332 06/18/19  1005 05/29/19  1217 04/30/19  1627  03/26/19  1604 02/26/19  1425 02/15/19  1044   PSA 0.82 0.80 1.18 0.69 1.25 8.00* 12.30*   CGAB  --  646* 659* 1,309* 900* 654* 475*   TESTOSTTOTAL  --   --  <2* 7* 22* 462 222*   CGAB - Chromogranin A; TESTOSTTOTAL: Total testosterone.    Lab Test 07/10/19  1332 06/18/19  1005 05/29/19  1217    220 232*     CT CHEST/ABDOMEN/PELVIS W CONTRAST 7/2/2019 8:16 AM  COMPARISON: 5/3/2019.  FINDINGS: Chest: A hiatal hernia is again demonstrated. Sclerotic lesions involving C7 and T6 are less apparent today. Abdomen: Two subcentimeter liver lesions seen on axial images 53 and 59 haven't significantly changed from 1/25/2019. This stability is supportive evidence for a benign entity. The right kidney is surgically absent. Previously suspected postop change in the right surgical bed is less impressive today.  A small sclerotic focus involving L4 is unchanged. Pelvis: A small fat-containing right inguinal hernia is again seen.  Sclerotic foci involving the bony pelvis and left femoral neck are similar to the prior study. IMPRESSION:  Osseous lesions are stable to slightly less impressive compared to the prior study. No new findings.    NUCLEAR MEDICINE BONE SCAN WHOLE BODY  7/2/2019 10:53 AM  COMPARISON: bone scan: 5/3/2019   FINDINGS: There are multiple foci of abnormal increased activity involving the calvarium, ribs, left proximal femur, and cervicothoracic junction compatible with osseous metastasis. The distribution appears to be similar if not unchanged. Normal left renal activity is noted. No right renal activity is visible compatible with nephrectomy. IMPRESSION: 1. Multiple osseous metastasis, similar if not unchanged in  distribution compared to 5/3/2019. 2. Right nephrectomy.    ASSESSMENT & PLAN: Mr. Reyes is a delightful 59 year old gentleman with recently diagnosed metastatic castration sensitive prostate adenocarcinoma as well as an incidentally diagnosed stage III clear-cell renal cell carcinoma  "of right kidney (s/p radical R nephrectomy 3/19/19), who is here for a follow-up visit while undergoing docetaxel + ADT for prostate cancer.      1. Metastatic prostate cancer, with involvement of the bones and RPLN:   - Patient meets the criteria for CHAARTED \"high-volume\" metastatic hormone-sensitive prostate cancer.  - There were no available clinical trials for this gentleman at the time of diagnosis, especially given concurrent malignancy.  He  was enrolled in the institutional biobanking study on 2/15/19 but this is a non-interventional study.   - After a review of systemic therapy options, patient opted to start docetaxel in combination with ADT (per JOSEFA, [id number], KARLA).   - Reviewed restaging scan results from 7/2/19, that show likely ongoing partial response with \"slightly less impressive osseous lesions\" compared to the prior study in May 2019.  - Plan to continue with cycle 4 docetaxel today. Regimen will be docetaxel 75mg/m2 IV every 3 weeks for up to 6 cycles. No primary prophy G-CSF.  - Patient developed a grade 1 allergic reaction with cycle 1 (tongue swelling). Dose interrupted and given solumedrol and benadryl. RN successfully able to reinitiate docetaxel and patient tolerated remainder of infusion well. Added dex and benadryl premeds for all subsequent cycles.  - Risks/benefits of docetaxel including allergic reaction, fatigue, nausea/vomiting/constipation, myelosuppression, neuropathy, LFT changes etc previously discussed in detail.   - Encouraged use of salt-soda rinses to prevent and treat mucositis.  - Continue Lupron 22.5mg every 3 months - last dose on 5/29/19 (next due in late August 2019).  - He understands the risks and benefits of ADT including hot flashes, mood changes and long-term cardiovascular, bone health, etc. Also understands the risks of docetaxel treatment including fatigue, nausea, vomiting, diarrhea/constipation, hand-foot syndrome, allergic reactions, " myelosuppression with increased risk of infection and bleeding etc.  - Will repeat restaging scans after an additional 3 cycles.   - Will also add antiresorptive therapy based on restaging results after completion of chemo.      2. Stage 3, UISS-high risk ccRCC:  - Patient understands that he has a stage III, UISS high risk clear-cell renal cell carcinoma with approximately 40-50% risk of recurrence after definitive surgery.   - At this time, he has no clear evidence of residual disease.  The retroperitoneal lymphadenopathy is more consistent with metastatic prostate cancer - this will need to be watched.  - He understands the role of adjuvant therapies in RCC and that at best, the data for adjuvant TKI therapy is mixed and shows an improvement in disease-free survival and not overall survival.   - Continue active surveillance with self-reporting for signs/symptoms of recurrence (this was previously explained in detail) and periodic H&P and scans.      3. Genetics referral:  - Was referred and seen by Genetics on 3/7/19. No evidence of inherited cancer syndromes found on work-up.      4. Research participation:  - Enrolled in  biobanking study.    5. Diet:  - Patient and wife inquiring about dietary changes, particularly cutting out processed foods, dairy and less meat. Also wondering about supplements. Encouraged to eat healthy and exercise, but advised against cutting back significantly on caloric intake and urged to make sure he's still getting all of the necessary nutrients in his diet. He agrees to wait to start any supplements until after chemotherapy.    Return to clinic with labs prior to the next cycle of chemotherapy.    Patient was seen and discussed with attending physician Dr. Uday Smalls.    Holly Warren MD/PhD  Heme/Onc Fellow      ATTENDING ATTESTATION NOTE:  I saw the patient with Dr. Warren and discussed all pertinent clinical data including lab, imaging and path reports. The plan above was  made under my supervision and accurately reflects the A/P after my examination and discussion with the patient.     BILLIN.     Uday Smalls M.D.  . Professor of Medicine  Genitourinary Oncology  Division of Hematology, Oncology & Transplantation  [facility]

## 2019-07-10 NOTE — PATIENT INSTRUCTIONS
Contact Numbers    [facility] Main Line: [phone]    Triage Nurse Line: [phone]      Please call the [facility] Nurse Triage line if you experience a temperature greater than or equal to 100.5, shaking chills, have uncontrolled nausea, vomiting and/or diarrhea, dizziness, shortness of breath, bleeding not relieved with pressure, or if you have any other questions or concerns.     If it is after hours, weekends, or holidays, please call the [phone] and a nurse will be able to triage your call.    If you are having any concerning symptoms or wish to speak to a provider before your next infusion visit, please call your care coordinator or triage them so we can adequately serve you.      If you need to refill your narcotic prescription or other medication, please call triage before your infusion appointment.        July 2019 Sunday Monday Tuesday Wednesday Thursday Friday Saturday        1     2    NM INJ   7:30 AM   (15 min.)   [email or web address]   [facility]    CT CHEST/ABDOMEN/PELVIS W   8:00 AM   (30 min.)   [email or web address]   [facility] CT    NM BONE SCAN WHOLE BODY  10:30 AM   (60 min.)   [email or web address]   [facility] 3     4     5     6       7     8     9     10    [facility] DRAW   1:45 PM   (15 min.)    [facility] LAB DRAW   [facility] Lab Draw    P RETURN   2:15 PM   (30 min.)   Uday Smalls MD   [facility]    [facility] ONC INFUSION 120   3:00 PM   (120 min.)    ONCOLOGY INFUSION   [facility] 11    [facility] RETURN   3:45 PM   (15 min.)   Wesley Cleveland MD   [facility] [place] 12     13       14     15     16     17     18     19     20       21     22     23     24     25     26     27       28     29     30     31 August 2019 Sunday Monday Tuesday Wednesday Thursday Friday Saturday                       1     2     3       4      5     6     7     8     9     10       11     12     13     14     15     16     17       18     19     20     21     22     23     24       25     26     27     28     29     30     31                    Recent Results (from the past 24 hour(s))   Lactate Dehydrogenase    Collection Time: 07/10/19  1:32 PM   Result Value Ref Range    Lactate Dehydrogenase 218 85 - 227 U/L   Comprehensive metabolic panel    Collection Time: 07/10/19  1:32 PM   Result Value Ref Range    Sodium 139 133 - 144 mmol/L    Potassium 4.1 3.4 - 5.3 mmol/L    Chloride 108 94 - 109 mmol/L    Carbon Dioxide 24 20 - 32 mmol/L    Anion Gap 7 3 - 14 mmol/L    Glucose 124 (H) 70 - 99 mg/dL    Urea Nitrogen 18 7 - 30 mg/dL    Creatinine 1.11 0.66 - 1.25 mg/dL    GFR Estimate 73 >60 mL/min/[1.73_m2]    GFR Estimate If Black 85 >60 mL/min/[1.73_m2]    Calcium 9.3 8.5 - 10.1 mg/dL    Bilirubin Total 0.4 0.2 - 1.3 mg/dL    Albumin 3.9 3.4 - 5.0 g/dL    Protein Total 7.4 6.8 - 8.8 g/dL    Alkaline Phosphatase 66 40 - 150 U/L    ALT 27 0 - 70 U/L    AST 20 0 - 45 U/L   PSA tumor marker    Collection Time: 07/10/19  1:32 PM   Result Value Ref Range    PSA 0.82 0 - 4 ug/L   CBC with platelets differential    Collection Time: 07/10/19  1:32 PM   Result Value Ref Range    WBC 9.9 4.0 - 11.0 10e9/L    RBC Count 3.99 (L) 4.4 - 5.9 10e12/L    Hemoglobin 12.1 (L) 13.3 - 17.7 g/dL    Hematocrit 36.7 (L) 40.0 - 53.0 %    MCV 92 78 - 100 fl    MCH 30.3 26.5 - 33.0 pg    MCHC 33.0 31.5 - 36.5 g/dL    RDW 15.0 10.0 - 15.0 %    Platelet Count 259 150 - 450 10e9/L    Diff Method Automated Method     % Neutrophils 87.5 %    % Lymphocytes 10.2 %    % Monocytes 1.8 %    % Eosinophils 0.0 %    % Basophils 0.1 %    % Immature Granulocytes 0.4 %    Nucleated RBCs 0 0 /100    Absolute Neutrophil 8.6 (H) 1.6 - 8.3 10e9/L    Absolute Lymphocytes 1.0 0.8 - 5.3 10e9/L    Absolute Monocytes 0.2 0.0 - 1.3 10e9/L    Absolute Eosinophils 0.0 0.0 - 0.7 10e9/L    Absolute Basophils 0.0  0.0 - 0.2 10e9/L    Abs Immature Granulocytes 0.0 0 - 0.4 10e9/L    Absolute Nucleated RBC 0.0

## 2019-07-10 NOTE — NURSING NOTE
Oncology Rooming Note    July 10, 2019 2:08 PM   Walter Reyes is a 59 year old male who presents for:    Chief Complaint   Patient presents with     Port Draw     port accessed and labs drawn by rn.  vital signs taken.     Oncology Clinic Visit     Return Prostate Ca     Initial Vitals: /74 (BP Location: Right arm, Patient Position: Sitting, Cuff Size: Adult Large)   Pulse 67   Temp 98  F (36.7  C) (Oral)   Resp 16   Ht 1.829 m (6')   Wt 110.3 kg (243 lb 1.6 oz)   SpO2 96%   BMI 32.97 kg/m   Estimated body mass index is 32.97 kg/m  as calculated from the following:    Height as of this encounter: 1.829 m (6').    Weight as of this encounter: 110.3 kg (243 lb 1.6 oz). Body surface area is 2.37 meters squared.  No Pain (0) Comment: Data Unavailable   No LMP for male patient.  Allergies reviewed: Yes  Medications reviewed: Yes    Medications: Medication refills not needed today.  Pharmacy name entered into EPIC: [facility] [place], [place] - [address] [address]    Clinical concerns: CT  And PET scan;        Mercy Valle CMA

## 2019-07-11 ENCOUNTER — OFFICE VISIT (OUTPATIENT)
Dept: UROLOGY | Facility: CLINIC | Age: 59
End: 2019-07-11
Payer: COMMERCIAL

## 2019-07-11 VITALS
HEART RATE: 58 BPM | OXYGEN SATURATION: 98 % | BODY MASS INDEX: 32.91 KG/M2 | SYSTOLIC BLOOD PRESSURE: 118 MMHG | HEIGHT: 72 IN | DIASTOLIC BLOOD PRESSURE: 72 MMHG | WEIGHT: 243 LBS

## 2019-07-11 DIAGNOSIS — C64.1 RENAL CELL CARCINOMA, RIGHT (H): Primary | ICD-10-CM

## 2019-07-11 DIAGNOSIS — C79.51 MALIGNANT NEOPLASM OF PROSTATE METASTATIC TO BONE (H): ICD-10-CM

## 2019-07-11 DIAGNOSIS — C61 MALIGNANT NEOPLASM OF PROSTATE METASTATIC TO BONE (H): ICD-10-CM

## 2019-07-11 PROBLEM — N28.89 RIGHT RENAL MASS: Status: RESOLVED | Noted: 2019-03-11 | Resolved: 2019-07-11

## 2019-07-11 PROCEDURE — 99213 OFFICE O/P EST LOW 20 MIN: CPT | Performed by: UROLOGY

## 2019-07-11 ASSESSMENT — PAIN SCALES - GENERAL: PAINLEVEL: NO PAIN (0)

## 2019-07-11 ASSESSMENT — MIFFLIN-ST. JEOR: SCORE: 1965.24

## 2019-07-11 NOTE — LETTER
7/11/2019      RE: Walter Reyes  [address] [facility] [address]         CHIEF COMPLAINT   It was my pleasure to see Walter Reyes who is a 59 year old male for follow-up of renal cell carcinoma and metastatic prostate cancer.      HPI   Walter Reyes is a very pleasant 59 year old male who presents with a history of renal cell carcinoma. Now s/p right lap nephrectomy 3/19/2019, Grade 3 clear cell RCC, stage pT3a. Also with metastatic prostate cancer and follows with Dr. Smalls. He is receiving docetaxel for his prostate cancer. Overall doing well at this time.    CT chest/abd/pelvis 7/2/2019  IMPRESSION:  Osseous lesions are stable to slightly less impressive  compared to the prior study. No new findings.    Nephrectomy 3/19/2019   Specimen        Procedure:    Radical nephrectomy        Specimen Laterality:   Right   Tumor        Histologic Type:   Clear cell renal cell carcinoma        Sarcomatoid Features:   Not identified        Histologic Grade:    Nucleolar grade 3 (WHO/ISUP).   Extent        Tumor Size:   4.3 cm.    Pathologic Staging (pTNM)        Primary Tumor (pT):    pT3a: Tumor extends into renal vein   branches/renal sinus.     PHYSICAL EXAM  Patient is a 59 year old  male   Vitals: Blood pressure 118/72, pulse 58, height 1.829 m (6'), weight 110.2 kg (243 lb), SpO2 98 %.  General Appearance Adult: Body mass index is 32.96 kg/m .  Alert, no acute distress, oriented  HENT: throat/mouth:normal, good dentition  Lungs: no respiratory distress, or pursed lip breathing  Heart: No obvious jugular venous distension present  Abdomen: soft, nontender, no organomegaly or masses; incisions well-healed  Back: no CVAT  Musculoskeltal: extremities normal, no peripheral edema  Skin: no suspicious lesions or rashes  Neuro: Alert, oriented, speech and mentation normal  Psych: affect and mood normal  Gait: Normal    ASSESSMENT and PLAN  59 year old male with pT3a clear cell renal  cell carcinoma, now s/p right nephrectomy completed 3/19/2019. Doing well.  He also has metastatic prostate cancer and is in the midst of docetaxel chemotherapy. At this point, from an RCC perspective will proceed with surveillance, as outlined in Dr. Smalls's most recent note. Dr. Smalls is also managing his prostate cancer. Given his multiple malignancies and well coordinated care with Dr. Smalls, I will defer further surveillance and follow-up to the medical oncology team. I am happy to see Mr. Reyes back at any time if there are questions or concerns. He will otherwise follow-up with me as needed.      - Will defer further surveillance visits and scans to Dr. Smalls and the medical oncology team for both the prostate and renal cell carcinoma.  - Follow-up with urology as needed    I spent over 15 minutes with the patient.  Over half this time was spent on counseling regarding renal cell carcinoma.    Wesley Cleveland MD  Urology  [facility]  Clinic Phone [phone]

## 2019-07-11 NOTE — PROGRESS NOTES
CHIEF COMPLAINT   It was my pleasure to see Walter Reyes who is a 59 year old male for follow-up of renal cell carcinoma and metastatic prostate cancer.      HPI   Walter Reyes is a very pleasant 59 year old male who presents with a history of renal cell carcinoma. Now s/p right lap nephrectomy 3/19/2019, Grade 3 clear cell RCC, stage pT3a. Also with metastatic prostate cancer and follows with Dr. Smalls. He is receiving docetaxel for his prostate cancer. Overall doing well at this time.    CT chest/abd/pelvis 7/2/2019  IMPRESSION:  Osseous lesions are stable to slightly less impressive  compared to the prior study. No new findings.    Nephrectomy 3/19/2019   Specimen        Procedure:    Radical nephrectomy        Specimen Laterality:   Right   Tumor        Histologic Type:   Clear cell renal cell carcinoma        Sarcomatoid Features:   Not identified        Histologic Grade:    Nucleolar grade 3 (WHO/ISUP).   Extent        Tumor Size:   4.3 cm.    Pathologic Staging (pTNM)        Primary Tumor (pT):    pT3a: Tumor extends into renal vein   branches/renal sinus.     PHYSICAL EXAM  Patient is a 59 year old  male   Vitals: Blood pressure 118/72, pulse 58, height 1.829 m (6'), weight 110.2 kg (243 lb), SpO2 98 %.  General Appearance Adult: Body mass index is 32.96 kg/m .  Alert, no acute distress, oriented  HENT: throat/mouth:normal, good dentition  Lungs: no respiratory distress, or pursed lip breathing  Heart: No obvious jugular venous distension present  Abdomen: soft, nontender, no organomegaly or masses; incisions well-healed  Back: no CVAT  Musculoskeltal: extremities normal, no peripheral edema  Skin: no suspicious lesions or rashes  Neuro: Alert, oriented, speech and mentation normal  Psych: affect and mood normal  Gait: Normal    ASSESSMENT and PLAN  59 year old male with pT3a clear cell renal cell carcinoma, now s/p right nephrectomy completed 3/19/2019. Doing well.  He also has  metastatic prostate cancer and is in the midst of docetaxel chemotherapy. At this point, from an RCC perspective will proceed with surveillance, as outlined in Dr. Smalls's most recent note. Dr. Smalls is also managing his prostate cancer. Given his multiple malignancies and well coordinated care with Dr. Smalls, I will defer further surveillance and follow-up to the medical oncology team. I am happy to see Mr. Reyes back at any time if there are questions or concerns. He will otherwise follow-up with me as needed.      - Will defer further surveillance visits and scans to Dr. Smalls and the medical oncology team for both the prostate and renal cell carcinoma.  - Follow-up with urology as needed    I spent over 15 minutes with the patient.  Over half this time was spent on counseling regarding renal cell carcinoma.    Wesley Cleveland MD  Urology  [facility]  Clinic Phone [phone]

## 2019-07-12 LAB
CGA SERPL-MCNC: 646 NG/ML (ref 0–95)
TESTOST SERPL-MCNC: 3 NG/DL (ref 240–950)

## 2019-07-31 ENCOUNTER — RESEARCH ENCOUNTER (OUTPATIENT)
Dept: ONCOLOGY | Facility: CLINIC | Age: 59
End: 2019-07-31

## 2019-07-31 ENCOUNTER — INFUSION THERAPY VISIT (OUTPATIENT)
Dept: ONCOLOGY | Facility: CLINIC | Age: 59
End: 2019-07-31
Attending: INTERNAL MEDICINE
Payer: COMMERCIAL

## 2019-07-31 ENCOUNTER — APPOINTMENT (OUTPATIENT)
Dept: LAB | Facility: CLINIC | Age: 59
End: 2019-07-31
Attending: INTERNAL MEDICINE
Payer: COMMERCIAL

## 2019-07-31 VITALS
RESPIRATION RATE: 16 BRPM | TEMPERATURE: 98 F | HEART RATE: 86 BPM | SYSTOLIC BLOOD PRESSURE: 124 MMHG | OXYGEN SATURATION: 98 % | WEIGHT: 239.3 LBS | BODY MASS INDEX: 32.45 KG/M2 | DIASTOLIC BLOOD PRESSURE: 85 MMHG

## 2019-07-31 DIAGNOSIS — C61 MALIGNANT NEOPLASM OF PROSTATE METASTATIC TO BONE (H): Primary | ICD-10-CM

## 2019-07-31 DIAGNOSIS — C79.51 MALIGNANT NEOPLASM OF PROSTATE METASTATIC TO BONE (H): ICD-10-CM

## 2019-07-31 DIAGNOSIS — T45.1X5A ANTINEOPLASTIC CHEMOTHERAPY INDUCED PANCYTOPENIA (H): Primary | ICD-10-CM

## 2019-07-31 DIAGNOSIS — C61 MALIGNANT NEOPLASM OF PROSTATE METASTATIC TO BONE (H): ICD-10-CM

## 2019-07-31 DIAGNOSIS — C79.51 MALIGNANT NEOPLASM OF PROSTATE METASTATIC TO BONE (H): Primary | ICD-10-CM

## 2019-07-31 DIAGNOSIS — D61.810 ANTINEOPLASTIC CHEMOTHERAPY INDUCED PANCYTOPENIA (H): Primary | ICD-10-CM

## 2019-07-31 LAB
ALBUMIN SERPL-MCNC: 3.9 G/DL (ref 3.4–5)
ALP SERPL-CCNC: 61 U/L (ref 40–150)
ALT SERPL W P-5'-P-CCNC: 27 U/L (ref 0–70)
ANION GAP SERPL CALCULATED.3IONS-SCNC: 5 MMOL/L (ref 3–14)
AST SERPL W P-5'-P-CCNC: 16 U/L (ref 0–45)
BASOPHILS # BLD AUTO: 0 10E9/L (ref 0–0.2)
BASOPHILS NFR BLD AUTO: 0.1 %
BILIRUB SERPL-MCNC: 0.5 MG/DL (ref 0.2–1.3)
BUN SERPL-MCNC: 20 MG/DL (ref 7–30)
CALCIUM SERPL-MCNC: 9.2 MG/DL (ref 8.5–10.1)
CHLORIDE SERPL-SCNC: 108 MMOL/L (ref 94–109)
CO2 SERPL-SCNC: 24 MMOL/L (ref 20–32)
CREAT SERPL-MCNC: 1.07 MG/DL (ref 0.66–1.25)
DIFFERENTIAL METHOD BLD: ABNORMAL
EOSINOPHIL # BLD AUTO: 0 10E9/L (ref 0–0.7)
EOSINOPHIL NFR BLD AUTO: 0 %
ERYTHROCYTE [DISTWIDTH] IN BLOOD BY AUTOMATED COUNT: 13.9 % (ref 10–15)
GFR SERPL CREATININE-BSD FRML MDRD: 76 ML/MIN/{1.73_M2}
GLUCOSE SERPL-MCNC: 134 MG/DL (ref 70–99)
HCT VFR BLD AUTO: 39.5 % (ref 40–53)
HGB BLD-MCNC: 13.1 G/DL (ref 13.3–17.7)
IMM GRANULOCYTES # BLD: 0.1 10E9/L (ref 0–0.4)
IMM GRANULOCYTES NFR BLD: 0.6 %
LDH SERPL L TO P-CCNC: 219 U/L (ref 85–227)
LYMPHOCYTES # BLD AUTO: 0.8 10E9/L (ref 0.8–5.3)
LYMPHOCYTES NFR BLD AUTO: 8.9 %
MCH RBC QN AUTO: 30.5 PG (ref 26.5–33)
MCHC RBC AUTO-ENTMCNC: 33.2 G/DL (ref 31.5–36.5)
MCV RBC AUTO: 92 FL (ref 78–100)
MONOCYTES # BLD AUTO: 0.1 10E9/L (ref 0–1.3)
MONOCYTES NFR BLD AUTO: 0.7 %
NEUTROPHILS # BLD AUTO: 7.9 10E9/L (ref 1.6–8.3)
NEUTROPHILS NFR BLD AUTO: 89.7 %
NRBC # BLD AUTO: 0 10*3/UL
NRBC BLD AUTO-RTO: 0 /100
PLATELET # BLD AUTO: 200 10E9/L (ref 150–450)
POTASSIUM SERPL-SCNC: 3.8 MMOL/L (ref 3.4–5.3)
PROT SERPL-MCNC: 7.5 G/DL (ref 6.8–8.8)
PSA SERPL-MCNC: 0.95 UG/L (ref 0–4)
RBC # BLD AUTO: 4.3 10E12/L (ref 4.4–5.9)
SODIUM SERPL-SCNC: 138 MMOL/L (ref 133–144)
WBC # BLD AUTO: 8.8 10E9/L (ref 4–11)

## 2019-07-31 PROCEDURE — 25000128 H RX IP 250 OP 636: Mod: ZF | Performed by: INTERNAL MEDICINE

## 2019-07-31 PROCEDURE — 86316 IMMUNOASSAY TUMOR OTHER: CPT | Performed by: INTERNAL MEDICINE

## 2019-07-31 PROCEDURE — 83615 LACTATE (LD) (LDH) ENZYME: CPT | Performed by: INTERNAL MEDICINE

## 2019-07-31 PROCEDURE — 96413 CHEMO IV INFUSION 1 HR: CPT

## 2019-07-31 PROCEDURE — 84153 ASSAY OF PSA TOTAL: CPT | Performed by: INTERNAL MEDICINE

## 2019-07-31 PROCEDURE — 25800030 ZZH RX IP 258 OP 636: Mod: ZF | Performed by: INTERNAL MEDICINE

## 2019-07-31 PROCEDURE — 84403 ASSAY OF TOTAL TESTOSTERONE: CPT | Performed by: INTERNAL MEDICINE

## 2019-07-31 PROCEDURE — 85025 COMPLETE CBC W/AUTO DIFF WBC: CPT | Performed by: INTERNAL MEDICINE

## 2019-07-31 PROCEDURE — 80053 COMPREHEN METABOLIC PANEL: CPT | Performed by: INTERNAL MEDICINE

## 2019-07-31 PROCEDURE — 96375 TX/PRO/DX INJ NEW DRUG ADDON: CPT

## 2019-07-31 PROCEDURE — 96367 TX/PROPH/DG ADDL SEQ IV INF: CPT

## 2019-07-31 PROCEDURE — G0463 HOSPITAL OUTPT CLINIC VISIT: HCPCS | Mod: ZF

## 2019-07-31 PROCEDURE — 99215 OFFICE O/P EST HI 40 MIN: CPT | Mod: ZP | Performed by: INTERNAL MEDICINE

## 2019-07-31 RX ORDER — ALBUTEROL SULFATE 0.83 MG/ML
2.5 SOLUTION RESPIRATORY (INHALATION)
Status: CANCELLED | OUTPATIENT
Start: 2019-07-31

## 2019-07-31 RX ORDER — ALBUTEROL SULFATE 90 UG/1
1-2 AEROSOL, METERED RESPIRATORY (INHALATION)
Status: CANCELLED
Start: 2019-07-31

## 2019-07-31 RX ORDER — DIPHENHYDRAMINE HYDROCHLORIDE 50 MG/ML
50 INJECTION INTRAMUSCULAR; INTRAVENOUS ONCE
Status: CANCELLED
Start: 2019-07-31

## 2019-07-31 RX ORDER — HEPARIN SODIUM (PORCINE) LOCK FLUSH IV SOLN 100 UNIT/ML 100 UNIT/ML
5 SOLUTION INTRAVENOUS ONCE
Status: COMPLETED | OUTPATIENT
Start: 2019-07-31 | End: 2019-07-31

## 2019-07-31 RX ORDER — HEPARIN SODIUM (PORCINE) LOCK FLUSH IV SOLN 100 UNIT/ML 100 UNIT/ML
500 SOLUTION INTRAVENOUS EVERY 8 HOURS
Status: DISCONTINUED | OUTPATIENT
Start: 2019-07-31 | End: 2019-07-31 | Stop reason: HOSPADM

## 2019-07-31 RX ORDER — METHYLPREDNISOLONE SODIUM SUCCINATE 125 MG/2ML
125 INJECTION, POWDER, LYOPHILIZED, FOR SOLUTION INTRAMUSCULAR; INTRAVENOUS
Status: CANCELLED
Start: 2019-07-31

## 2019-07-31 RX ORDER — MEPERIDINE HYDROCHLORIDE 25 MG/ML
25 INJECTION INTRAMUSCULAR; INTRAVENOUS; SUBCUTANEOUS EVERY 30 MIN PRN
Status: CANCELLED | OUTPATIENT
Start: 2019-07-31

## 2019-07-31 RX ORDER — DIPHENHYDRAMINE HYDROCHLORIDE 50 MG/ML
50 INJECTION INTRAMUSCULAR; INTRAVENOUS ONCE
Status: DISCONTINUED | OUTPATIENT
Start: 2019-07-31 | End: 2019-07-31

## 2019-07-31 RX ORDER — LORAZEPAM 2 MG/ML
0.5 INJECTION INTRAMUSCULAR EVERY 4 HOURS PRN
Status: CANCELLED
Start: 2019-07-31

## 2019-07-31 RX ORDER — DIPHENHYDRAMINE HYDROCHLORIDE 50 MG/ML
50 INJECTION INTRAMUSCULAR; INTRAVENOUS
Status: CANCELLED
Start: 2019-07-31

## 2019-07-31 RX ORDER — HEPARIN SODIUM (PORCINE) LOCK FLUSH IV SOLN 100 UNIT/ML 100 UNIT/ML
500 SOLUTION INTRAVENOUS EVERY 8 HOURS
Status: CANCELLED
Start: 2019-07-31

## 2019-07-31 RX ORDER — EPINEPHRINE 1 MG/ML
0.3 INJECTION, SOLUTION INTRAMUSCULAR; SUBCUTANEOUS EVERY 5 MIN PRN
Status: CANCELLED | OUTPATIENT
Start: 2019-07-31

## 2019-07-31 RX ORDER — SODIUM CHLORIDE 9 MG/ML
1000 INJECTION, SOLUTION INTRAVENOUS CONTINUOUS PRN
Status: CANCELLED
Start: 2019-07-31

## 2019-07-31 RX ORDER — EPINEPHRINE 0.3 MG/.3ML
0.3 INJECTION SUBCUTANEOUS EVERY 5 MIN PRN
Status: CANCELLED | OUTPATIENT
Start: 2019-07-31

## 2019-07-31 RX ADMIN — DEXAMETHASONE SODIUM PHOSPHATE 12 MG: 10 INJECTION, SOLUTION INTRAMUSCULAR; INTRAVENOUS at 13:39

## 2019-07-31 RX ADMIN — DOCETAXEL 180 MG: 80 INJECTION, SOLUTION, CONCENTRATE INTRAVENOUS at 14:08

## 2019-07-31 RX ADMIN — SODIUM CHLORIDE 250 ML: 9 INJECTION, SOLUTION INTRAVENOUS at 13:20

## 2019-07-31 RX ADMIN — HEPARIN 5 ML: 100 SYRINGE at 12:18

## 2019-07-31 RX ADMIN — HEPARIN 500 UNITS: 100 SYRINGE at 15:13

## 2019-07-31 RX ADMIN — DIPHENHYDRAMINE HYDROCHLORIDE 50 MG: 50 INJECTION INTRAMUSCULAR; INTRAVENOUS at 13:26

## 2019-07-31 ASSESSMENT — PAIN SCALES - GENERAL: PAINLEVEL: NO PAIN (0)

## 2019-07-31 NOTE — NURSING NOTE
Chief Complaint   Patient presents with     Oncology Clinic Visit     Prostate Cancer     Port Draw     Labs drawn via port by RN in lab. VS taken. Pt checked in for next appt     Port accessed with 20g gripper needle by RN, labs collected, line flushed with saline and heparin.  Vitals taken. Pt checked in for appointment(s).    Gloria SEAY RN PHN BSN  BMT/Oncology Lab

## 2019-07-31 NOTE — PROGRESS NOTES
"MEDICAL ONCOLOGY FOLLOW-UP NOTE    REFERRING PROVIDER: Jorge Alberto Yepez MD at [facility].    REASON FOR VISIT: Evaluation before cycle 5 of docetaxel + ADT for metastatic hormone-sensitive prostate cancer.    HISTORY OF PRESENT ILLNESS: Mr. 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 His oncologic history is detailed below.     Walter reports an increasing burden of symptoms including nausea and fatigue, with the latter lasting approx 1.5 weeks. Grade 1 neuropathy in bilateral toes (chronic; past 2 years) is stable and resolved in fingers; however, he does have a new grade 1 neuropathy in bilateral thumbs. Noted additional hair loss. He denies any low-grade temps or fevers. No hematuria, SOB/cough, or rashes. Chronic obstructive urinary symptoms unchanged. Wants to continue with docetaxel chemotherapy. No signs/symptoms concerning for disease progression.    Recently started a new diet, working on cutting out processed foods and eating less meat.     ONCOLOGIC HISTORY:  1. Prostate adenocarcinoma, stage IV (M1b at diagnosis), high-volume, castration-sensitive:  - 12/13/2018: PSA found to be elevated to 9.1 ng/mL on a routine follow-up with primary care provider Dr. Naylor at [facility]. Prior PSA were 1.4 on 8/16/17, 2.4 on 6/28/16, 2.9 on 6/28/16, and as low as 0.4 on 3/26/2003.   - 1/08/2019: Consultation with Sarah Wilson CNP in Urology clinic. Repeat PSA 9.6.  - 1/16/2019: MRI prostate with contrast - \"This examination is characterized as PIRADS 5- very high probability. Clinically significant cancer is highly likely to be present. There is a large, invasive mass arising from the right peripheral zone and extending into the neurovascular bundle, seminal vesicle, and along the anterolateral right mesorectal fascia. " "Metastatic right external iliac lymph node. Metastatic lesion in the posterior/superior right acetabulum.\"  - 1/25/2019: CT abdomen and pelvis with IV contrast - \"1. Heterogeneous enhancing mass posteriorly in the upper pole of the right kidney measures 4.5 x 5.8 x 5.7 cm (AP by transverse by craniocaudal). It has a small nodular component extending posterior medially which abuts the right psoas muscle. This nodular extension measures 2.1 x 2.1 cm. Minimal stranding about the mass. No definite thrombus within the right renal vein. This renal mass is compatible with a renal cell carcinoma. A paraaortic lymph node situated immediately posterior to the left renal vein measures 1.1 cm in short axis, suspicious for metastasis. 2. A 2 cm right external iliac lymph node is also suspicious for metastases. 3. Multiple sclerotic osseous lesions suspicious for metastases. These include 0.8 and 0.6 cm sclerotic lesions laterally in the right iliac wing (images 53 and 62 respectively). Ill-defined groundglass density laterally in the right acetabulum measuring 1.7 x 1.2 cm corresponds with the lesion identified on MRI. A 0.4 cm groundglass density in the left acetabulum. Sclerotic lesion in the left femoral neck measures 0.9 x 1.6 cm (image 81). Sclerotic metastases would be more compatible with prostate metastases. 4. A 3 subcentimeter hepatic lesions are indeterminate. Metastases would be a consideration. These can be further characterized with liver MRI.\"  - 1/25/2019: NM bone scan - \"There is focal bony uptake in the left femoral neck, right acetabulum, right of midline at the S1 or L5 level of the spine, within multiple bilateral ribs, in the C7 or T1 level of the spine, and anteriorly within the skull. Findings are suspicious for metastases.\"  - 1/31/19: CT chest with contrast - \" No suspicious nodules in the chest.  Stable appearance of a 5.8 cm right renal mass concerning for renal carcinoma until proven otherwise.  " "Stable indeterminant subcentimeter hypodensities in the liver.  Multifocal osteoblastic metastasis including 2.5 cm lesion in the right fifth rib posteriorly and a 1.6 cm lesion in the right third rib posteriorly. No lytic lesions identified.\"  - 2/6/19: CT-guided right sclerotic fifth rib lesion biopsy - \"Metastatic carcinoma, consistent with prostate primary.  Immunohistochemical stains performed show the metastatic carcinoma stains positive for NKX3.1 (prostate marker), negative for STACIE 3 and PAX8, which supports the above diagnosis.\"  - 2/15/19: Started Casodex 50mg every day and consented for the biobanking protocol. 3/18/19 - stopped Casodex.  - 2/19/19: Case discussed in tumor board - recommendation for nephectomy for suspected malignant right renal mass.   - 02/26/19: Started Lupron 22.5mg every 3 months. 05/29/19 - cycle 2.   - 5/8/19: Started Docetaxel 75mg/m2 IV every 3 weeks. 06/18/19 - cycle 3, 7/10/19 - cycle 4; 07/31/19 - cycle 5.      2. Stage III ([id number]), grade 3 of 4, clear-cell RCC of right kidney:  - Incidentally diagnosed as above.   - Underwent curative-intent robotic right radical nephrectomy with Dr. Wesley Cleveland on 3/19/19. No tumor spillage per op report.   - Path showed: \"Histologic Type: Clear cell renal cell carcinoma; Sarcomatoid Features: Not identified; Histologic Grade: Nucleolar grade 3 (WHO/ISUP). Extent Tumor Size: 4.3 cm. Microscopic Tumor Extension: Tumor extension into renal sinus (in vascular structures). Margins: Negative. Tumor Necrosis: Present; focal. Lymph-Vascular Invasion: Not identified.  Pathologic Staging (pTNM) Primary Tumor (pT): pT3a: Tumor extends into renal vein branches/renal sinus. Regional Lymph Nodes (pN): pNX. Number of Lymph Nodes Examined: 0 Distant Metastasis (pM): pM N/A.\"    PAST MEDICAL HISTORY:  Past Medical History:   Diagnosis Date     Cancer of kidney, right (H)      Complication of anesthesia     slow wakeup      Malignant neoplasm of " prostate metastatic to bone (H) 2/14/2019     Thyroid disease      PAST SURGICAL HISTORY:   Past Surgical History:   Procedure Laterality Date     CYSTOSCOPY      about 10 years ago     INSERT PORT VASCULAR ACCESS Right 5/2/2019    Procedure: Chest Port Placement;  Surgeon: Tate Burciaga PA-C;  Location: [facility] OR     IR CHEST PORT PLACEMENT > 5 YRS OF AGE  5/2/2019     LAPAROSCOPIC NEPHRECTOMY Right 3/19/2019    Procedure: Right laparoscopic radical nephrectomy;  Surgeon: Wesley Cleveland MD;  Location:  OR      SOCIAL HISTORY:   Social History     Tobacco Use     Smoking status: Never Smoker     Smokeless tobacco: Never Used   Substance Use Topics     Alcohol use: None     Comment: wine - very rare     Drug use: No     FAMILY HISTORY:   Family History   Problem Relation Age of Onset     Diabetes Father      ALLERGIES:   Allergies   Allergen Reactions     Doxycycline GI Disturbance     CURRENT MEDICATIONS:   Current Outpatient Medications:      amLODIPine (NORVASC) 10 MG tablet, Take 1 tablet (10 mg) by mouth daily, Disp: 30 tablet, Rfl: 0     atorvastatin (LIPITOR) 20 MG tablet, Take 20 mg by mouth daily, Disp: , Rfl:      calcium citrate-vitamin D (CITRACAL) 315-250 MG-UNIT TABS per tablet, Take 650 mg by mouth 2 times daily , Disp: , Rfl:      cetirizine (ZYRTEC) 10 MG tablet, Take 10 mg by mouth as needed, Disp: , Rfl:      cinnamon 500 MG CAPS, Take 500 mg by mouth daily , Disp: , Rfl:      cycloSPORINE (RESTASIS) 0.05 % ophthalmic emulsion, Place 1 drop into both eyes 2 times daily , Disp: , Rfl:      dexamethasone (DECADRON) 4 MG tablet, Take 8 mg by mouth 2 times daily Evening PRIOR and continue morning of Docetaxel, then 4 additional doses.., Disp: 12 tablet, Rfl: 5     fluticasone (FLONASE) 50 MCG/ACT nasal spray, Spray 2 sprays in nostril daily as needed , Disp: , Rfl:      Glucosamine-Chondroit-Vit C-Mn (GLUCOSAMINE 1500 COMPLEX) CAPS, Take 1 capsule by mouth daily, Disp: , Rfl:       levothyroxine (SYNTHROID/LEVOTHROID) 125 MCG tablet, Take 125 mcg by mouth daily, Disp: , Rfl:      Multiple Vitamins-Minerals (MULTIVITAMIN ADULT EXTRA C PO), Take 1 tablet by mouth every 24 hours, Disp: , Rfl:      Nutritional Supplements (SALMON OIL) CAPS, Take 1 capsule by mouth daily, Disp: , Rfl:      omeprazole (PRILOSEC) 20 MG DR capsule, Take 20 mg by mouth daily, Disp: , Rfl:      prochlorperazine (COMPAZINE) 10 MG tablet, Take 1 tablet (10 mg) by mouth every 6 hours as needed (Nausea/Vomiting), Disp: 30 tablet, Rfl: 5  No current facility-administered medications for this visit.     Facility-Administered Medications Ordered in Other Visits:      0.9% sodium chloride BOLUS, 250 mL, Intravenous, Once, Uday Smalls MD     dexamethasone (DECADRON) 12 mg in sodium chloride 0.9 % intermittent infusion, 12 mg, Intravenous, Once, Uday Smalls MD     diphenhydrAMINE (BENADRYL) injection 50 mg, 50 mg, Intravenous, Once, Uday Smalls MD     DOCEtaxel (TAXOTERE) 178 mg in sodium chloride 0.9 % 258.9 mL CHEMOTHERAPY, 75 mg/m2 (Treatment Plan Recorded), Intravenous, Once, Uday Smalls MD     heparin 100 UNIT/ML injection 500 Units, 500 Units, Intravenous, Q8H, Uday Smalls MD     sodium chloride (PF) 0.9% PF flush 10 mL, 10 mL, Intravenous, Once, Uday Smalls MD    PHYSICAL EXAMINATION:  VITALS: /85 (BP Location: Right arm, Patient Position: Sitting, Cuff Size: Adult Large)   Pulse 86   Temp 98  F (36.7  C) (Oral)   Resp 16   Wt 108.5 kg (239 lb 4.8 oz)   SpO2 98%   BMI 32.45 kg/m     GENERAL: The patient is a pleasant male in no acute distress.HEENT: EOMI, PERRL. Sclerae are anicteric. Oral mucosa is pink and moist. Gingiva and buccal mucosa with mild edema but no lesions or thrush.   Lymph: Neck is supple with no lymphadenopathy in the cervical or supraclavicular areas.   Heart: Regular rate and rhythm.   Lungs: Clear to auscultation bilaterally.   Abdomen: Bowel sounds present, soft, nontender with no palpable  hepatosplenomegaly or masses.   Extremities: No lower extremity edema noted bilaterally.   Neuro: Alert and fully oriented, no focal deficits.  Skin: No rashes, petechiae, or bruising noted on exposed skin.  LABORATORY & IMAGING STUDIES:  Lab Test 07/31/19  1225 07/10/19  1332 06/18/19  1005   WBC 8.8 9.9 11.8*   RBC 4.30* 3.99* 4.14*   HGB 13.1* 12.1* 12.4*   HCT 39.5* 36.7* 36.7*   MCV 92 92 89   MCH 30.5 30.3 30.0   MCHC 33.2 33.0 33.8   RDW 13.9 15.0 15.4*    259 233   NEUTROPHIL 89.7 87.5 89.3    139 140   POTASSIUM 3.8 4.1 3.8   CHLORIDE 108 108 107   CO2 24 24 24   ANIONGAP 5 7 10   * 124* 136*   BUN 20 18 21   CR 1.07 1.11 1.04   BETHANY 9.2 9.3 9.3   PROTTOTAL 7.5 7.4 7.6   ALBUMIN 3.9 3.9 3.7   BILITOTAL 0.5 0.4 0.5   ALKPHOS 61 66 62   AST 16 20 20   ALT 27 27 30     Lab Test 07/31/19  1225 07/10/19  1332 06/18/19  1005 05/29/19  1217 04/30/19  1627 03/26/19  1604 02/26/19  1425   PSA 0.95 0.82 0.80 1.18 0.69 1.25 8.00*   CGAB  --  646* 646* 659* 1,309* 900* 654*   TESTOSTTOTAL  --  3*  --  <2* 7* 22* 462   CGAB - Chromogranin A; TESTOSTTOTAL: Total testosterone.    Lab Test 07/31/19  1225 07/10/19  1332 06/18/19  1005 05/29/19  1217    218 220 232*     CT CHEST/ABDOMEN/PELVIS W CONTRAST 7/2/2019 8:16 AM  COMPARISON: 5/3/2019.  FINDINGS: Chest: A hiatal hernia is again demonstrated. Sclerotic lesions involving C7 and T6 are less apparent today. Abdomen: Two subcentimeter liver lesions seen on axial images 53 and 59 haven't significantly changed from 1/25/2019. This stability is supportive evidence for a benign entity. The right kidney is surgically absent. Previously suspected postop change in the right surgical bed is less impressive today.  A small sclerotic focus involving L4 is unchanged. Pelvis: A small fat-containing right inguinal hernia is again seen.  Sclerotic foci involving the bony pelvis and left femoral neck are similar to the prior study. IMPRESSION:  Osseous lesions  "are stable to slightly less impressive compared to the prior study. No new findings.    NUCLEAR MEDICINE BONE SCAN WHOLE BODY  7/2/2019 10:53 AM  COMPARISON: bone scan: 5/3/2019   FINDINGS: There are multiple foci of abnormal increased activity involving the calvarium, ribs, left proximal femur, and cervicothoracic junction compatible with osseous metastasis. The distribution appears to be similar if not unchanged. Normal left renal activity is noted. No right renal activity is visible compatible with nephrectomy. IMPRESSION: 1. Multiple osseous metastasis, similar if not unchanged in  distribution compared to 5/3/2019. 2. Right nephrectomy.    ASSESSMENT & PLAN: Mr. Reyes is a delightful 59 year old gentleman with recently diagnosed metastatic castration sensitive prostate adenocarcinoma as well as an incidentally diagnosed stage III clear-cell renal cell carcinoma of right kidney (s/p radical R nephrectomy 3/19/19), who is here for a follow-up visit while undergoing docetaxel + ADT for prostate cancer.      1. Metastatic prostate cancer, with involvement of the bones and RPLN:   - Patient meets the criteria for CHAARTED \"high-volume\" metastatic hormone-sensitive prostate cancer.  - There were no available clinical trials for this gentleman at the time of diagnosis, especially given concurrent malignancy.  He was enrolled in the institutional biobanking study on 2/15/19 but this is a non-interventional study.   - After a review of systemic therapy options, patient opted to start docetaxel in combination with ADT (per JOSEFA, [id number], FELIPEEDE).   - Reviewed restaging scan results from 7/2/19, that show likely ongoing partial response with \"slightly less impressive osseous lesions\" compared to the prior study in May 2019.  - PSA slightly higher today, but it can fluctuate during chemo - will monitor.   - Plan to continue with cycle 5 docetaxel today. Regimen will be docetaxel 75mg/m2 IV every 3 weeks " for up to 6 cycles. No primary prophy G-CSF.  - He has a new small retinal vein occlusion and needs to start low-dose ASA (see below) - will check trough platelet next week to determine if this is safe.  - Patient developed a grade 1 allergic reaction with cycle 1 (tongue swelling). Dose interrupted and given solumedrol and benadryl. RN successfully able to reinitiate docetaxel and patient tolerated remainder of infusion well. Added dex and benadryl premeds for all subsequent cycles.  - Risks/benefits of docetaxel including allergic reaction, fatigue, nausea/vomiting/constipation, myelosuppression, neuropathy, LFT changes etc previously discussed in detail.   - Encouraged use of salt-soda rinses to prevent and treat mucositis.  - Continue Lupron 22.5mg every 3 months - last dose on 5/29/19 (next due in late August 2019).  - He understands the risks and benefits of ADT including hot flashes, mood changes and long-term cardiovascular, bone health, etc. Also understands the risks of docetaxel treatment including fatigue, nausea, vomiting, diarrhea/constipation, hand-foot syndrome, allergic reactions, myelosuppression with increased risk of infection and bleeding etc.  - Will repeat restaging scans after an additional 3 cycles.   - Will also add antiresorptive therapy based on restaging results after completion of chemo.      2. Stage 3, UISS-high risk ccRCC:  - Patient understands that he has a stage III, UISS high risk clear-cell renal cell carcinoma with approximately 40-50% risk of recurrence after definitive surgery.   - At this time, he has no clear evidence of residual disease.  The retroperitoneal lymphadenopathy is more consistent with metastatic prostate cancer - this will need to be watched.  - He understands the role of adjuvant therapies in RCC and that at best, the data for adjuvant TKI therapy is mixed and shows an improvement in disease-free survival and not overall survival.   - Continue active  surveillance with self-reporting for signs/symptoms of recurrence (this was previously explained in detail) and periodic H&P and scans.      3. Genetics referral:  - Was referred and seen by Genetics on 3/7/19. No evidence of inherited cancer syndromes found on work-up.      4. Research participation:  - Enrolled in  biobanking study.    5. Acute left retinal vein occlusion?  - Pt narrates a recent diagnosis of RVO per ophthalmologist. He was told that no surgery/intervention is needed and that this is a minor issue that will get better over time. He was given a choice of taking low-dose ASA to help with resolution of findings.  - From my standpoint, this requires weighing of risks and benefits from his ophthalmologist. From my perspective, it is very reasonable to start ASA today or in a week after checking platelet najma. Patient prefers the latter approach.  - Repeat platelet count next week and pt to call to seek input on whether it's safe to start ASA? If counts > 50K, it should be fine.    Return to clinic in 3 weeks.     BILLIN.     Uday Smalls M.D.  . Professor of Medicine  Genitourinary Oncology  Division of Hematology, Oncology & Transplantation  [facility]

## 2019-07-31 NOTE — NURSING NOTE
Oncology Rooming Note    July 31, 2019 12:30 PM   Walter Reyes is a 59 year old male who presents for:    Chief Complaint   Patient presents with     Oncology Clinic Visit     Prostate Cancer     Initial Vitals: /85 (BP Location: Right arm, Patient Position: Sitting, Cuff Size: Adult Large)   Pulse 86   Temp 98  F (36.7  C) (Oral)   Resp 16   Wt 108.5 kg (239 lb 4.8 oz)   SpO2 98%   BMI 32.45 kg/m   Estimated body mass index is 32.45 kg/m  as calculated from the following:    Height as of 7/11/19: 1.829 m (6').    Weight as of this encounter: 108.5 kg (239 lb 4.8 oz). Body surface area is 2.35 meters squared.  No Pain (0) Comment: Data Unavailable   No LMP for male patient.  Allergies reviewed: Yes  Medications reviewed: Yes    Medications: MEDICATION REFILLS NEEDED TODAY. Provider was notified.  Pharmacy name entered into EPIC: [facility] [place], [place] - [address]    Clinical concerns: Dexamethasone refill requested.       NICKY GOODE CMA

## 2019-07-31 NOTE — PROGRESS NOTES
[id number]: Re-Consent Note     New information has been added to the consent form. This was explained to the patient, and all his questions were answered. The patient verbalized understanding and would like to continue participation in the trial. The patient was provided with a signed copy of the IRB-approved consent form.    Consent Version Date: 13 June 2019  Consent obtained by: Rosina Colón     Date: 31 July 2019    Rosina Colón  Treating Physician:   Uday Smalls  Pager: [phone]  :  Hanh Rascon  Phone: [phone]  Primary Clinical Research Coordinator:  Rosina Colón  Phone: [phone]  Pager: [phone]                            Form 503.00.02 (Version 1)     Effective date: 01JUL2018     Next Review Date: 01JUL2020

## 2019-07-31 NOTE — PATIENT INSTRUCTIONS
Contact Numbers  [facility]: [phone]    After Hours:  [phone]  Triage: [phone]    Please call the [facility] Triage line if you experience a temperature greater than or equal to 100.5, shaking chills, have uncontrolled nausea, vomiting and/or diarrhea, dizziness, shortness of breath, chest pain, bleeding, unexplained bruising, or if you have any other new/concerning symptoms, questions or concerns.     If it is after hours, weekends, or holidays, please call the main hospital  at  [phone] and ask to speak to the Oncology doctor on call.     If you are having any concerning symptoms or wish to speak to a provider before your next infusion visit, please call your care coordinator or triage to notify them so we can adequately serve you.     If you need a refill on a narcotic prescription or other medication, please call triage before your infusion appointment.         July 2019 Sunday Monday Tuesday Wednesday Thursday Friday Saturday        1     2    NM INJ   7:30 AM   (15 min.)   [id number]   [facility]    CT CHEST/ABDOMEN/PELVIS W   8:00 AM   (30 min.)   WYCT1   [facility] CT    NM BONE SCAN WHOLE BODY  10:30 AM   (60 min.)   WYNM1   [facility] 3     4     5     6       7     8     9     10    [facility] LAB DRAW   1:45 PM   (15 min.)    [facility] LAB DRAW   [facility] Lab Draw    [facility] RETURN   2:15 PM   (30 min.)   [facility]   3:00 PM   (120 min.)    ONCOLOGY INFUSION   [facility] 11    [facility] RETURN   3:45 PM   (15 min.)   Wesley Cleveland MD   [facility] [place] 12     13       14     15     16     17     18     19     20       21     22     23     24     25     26     27       28     29     30     31    [facility] DRAW  12:15 PM   (15 min.)    [facility] LAB DRAW   [facility]  Lab Draw    [facility] RETURN  12:45 PM   (30 min.)   Uday Smalls MD   [facility]    [facility] ONC INFUSION 120   2:00 PM   (120 min.)   [facility]FUSION   [facility]                            August 2019 Sunday Monday Tuesday Wednesday Thursday Friday Saturday                       1     2     3       4     5     6     7     8     9     10       11     12     13     14     15     16     17       18     19     20     21     22     23     24       25     26     27     28     29     30     31                     Lab Results:  Recent Results (from the past 12 hour(s))   CBC with platelets differential    Collection Time: 07/31/19 12:25 PM   Result Value Ref Range    WBC 8.8 4.0 - 11.0 10e9/L    RBC Count 4.30 (L) 4.4 - 5.9 10e12/L    Hemoglobin 13.1 (L) 13.3 - 17.7 g/dL    Hematocrit 39.5 (L) 40.0 - 53.0 %    MCV 92 78 - 100 fl    MCH 30.5 26.5 - 33.0 pg    MCHC 33.2 31.5 - 36.5 g/dL    RDW 13.9 10.0 - 15.0 %    Platelet Count 200 150 - 450 10e9/L    Diff Method Automated Method     % Neutrophils 89.7 %    % Lymphocytes 8.9 %    % Monocytes 0.7 %    % Eosinophils 0.0 %    % Basophils 0.1 %    % Immature Granulocytes 0.6 %    Nucleated RBCs 0 0 /100    Absolute Neutrophil 7.9 1.6 - 8.3 10e9/L    Absolute Lymphocytes 0.8 0.8 - 5.3 10e9/L    Absolute Monocytes 0.1 0.0 - 1.3 10e9/L    Absolute Eosinophils 0.0 0.0 - 0.7 10e9/L    Absolute Basophils 0.0 0.0 - 0.2 10e9/L    Abs Immature Granulocytes 0.1 0 - 0.4 10e9/L    Absolute Nucleated RBC 0.0    Lactate Dehydrogenase    Collection Time: 07/31/19 12:25 PM   Result Value Ref Range    Lactate Dehydrogenase 219 85 - 227 U/L   Comprehensive metabolic panel    Collection Time: 07/31/19 12:25 PM   Result Value Ref Range    Sodium 138 133 - 144 mmol/L    Potassium 3.8 3.4 - 5.3 mmol/L    Chloride 108 94 - 109 mmol/L    Carbon Dioxide 24 20 - 32 mmol/L    Anion Gap 5 3 - 14 mmol/L    Glucose 134 (H) 70 - 99 mg/dL    Urea Nitrogen 20 7 - 30  mg/dL    Creatinine 1.07 0.66 - 1.25 mg/dL    GFR Estimate 76 >60 mL/min/[1.73_m2]    GFR Estimate If Black 89 >60 mL/min/[1.73_m2]    Calcium 9.2 8.5 - 10.1 mg/dL    Bilirubin Total 0.5 0.2 - 1.3 mg/dL    Albumin 3.9 3.4 - 5.0 g/dL    Protein Total 7.5 6.8 - 8.8 g/dL    Alkaline Phosphatase 61 40 - 150 U/L    ALT 27 0 - 70 U/L    AST 16 0 - 45 U/L   PSA tumor marker    Collection Time: 07/31/19 12:25 PM   Result Value Ref Range    PSA 0.95 0 - 4 ug/L

## 2019-07-31 NOTE — LETTER
"7/31/2019       RE: Walter Reyes  [address] [facility] [address]     Dear Colleague,    Thank you for referring your patient, Walter Reyes, to the [facility]. Please see a copy of my visit note below.    MEDICAL ONCOLOGY FOLLOW-UP NOTE    REFERRING PROVIDER: Jorge Alberto Yepez MD at [facility].    REASON FOR VISIT: Evaluation before cycle 5 of docetaxel + ADT for metastatic hormone-sensitive prostate cancer.    HISTORY OF PRESENT ILLNESS: Mr. 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 His oncologic history is detailed below.     Walter reports an increasing burden of symptoms including nausea and fatigue, with the latter lasting approx 1.5 weeks. Grade 1 neuropathy in bilateral toes (chronic; past 2 years) is stable and resolved in fingers; however, he does have a new grade 1 neuropathy in bilateral thumbs. Noted additional hair loss. He denies any low-grade temps or fevers. No hematuria, SOB/cough, or rashes. Chronic obstructive urinary symptoms unchanged. Wants to continue with docetaxel chemotherapy. No signs/symptoms concerning for disease progression.    Recently started a new diet, working on cutting out processed foods and eating less meat.     ONCOLOGIC HISTORY:  1. Prostate adenocarcinoma, stage IV (M1b at diagnosis), high-volume, castration-sensitive:  - 12/13/2018: PSA found to be elevated to 9.1 ng/mL on a routine follow-up with primary care provider Dr. Naylor at [facility]. Prior PSA were 1.4 on 8/16/17, 2.4 on 6/28/16, 2.9 on 6/28/16, and as low as 0.4 on 3/26/2003.   - 1/08/2019: Consultation with Sarah Wilson CNP in Urology clinic. Repeat PSA 9.6.  - 1/16/2019: MRI prostate with contrast - \"This examination is characterized as PIRADS 5- very high " "probability. Clinically significant cancer is highly likely to be present. There is a large, invasive mass arising from the right peripheral zone and extending into the neurovascular bundle, seminal vesicle, and along the anterolateral right mesorectal fascia. Metastatic right external iliac lymph node. Metastatic lesion in the posterior/superior right acetabulum.\"  - 1/25/2019: CT abdomen and pelvis with IV contrast - \"1. Heterogeneous enhancing mass posteriorly in the upper pole of the right kidney measures 4.5 x 5.8 x 5.7 cm (AP by transverse by craniocaudal). It has a small nodular component extending posterior medially which abuts the right psoas muscle. This nodular extension measures 2.1 x 2.1 cm. Minimal stranding about the mass. No definite thrombus within the right renal vein. This renal mass is compatible with a renal cell carcinoma. A paraaortic lymph node situated immediately posterior to the left renal vein measures 1.1 cm in short axis, suspicious for metastasis. 2. A 2 cm right external iliac lymph node is also suspicious for metastases. 3. Multiple sclerotic osseous lesions suspicious for metastases. These include 0.8 and 0.6 cm sclerotic lesions laterally in the right iliac wing (images 53 and 62 respectively). Ill-defined groundglass density laterally in the right acetabulum measuring 1.7 x 1.2 cm corresponds with the lesion identified on MRI. A 0.4 cm groundglass density in the left acetabulum. Sclerotic lesion in the left femoral neck measures 0.9 x 1.6 cm (image 81). Sclerotic metastases would be more compatible with prostate metastases. 4. A 3 subcentimeter hepatic lesions are indeterminate. Metastases would be a consideration. These can be further characterized with liver MRI.\"  - 1/25/2019: NM bone scan - \"There is focal bony uptake in the left femoral neck, right acetabulum, right of midline at the S1 or L5 level of the spine, within multiple bilateral ribs, in the C7 or T1 level of the " "spine, and anteriorly within the skull. Findings are suspicious for metastases.\"  - 1/31/19: CT chest with contrast - \" No suspicious nodules in the chest.  Stable appearance of a 5.8 cm right renal mass concerning for renal carcinoma until proven otherwise.  Stable indeterminant subcentimeter hypodensities in the liver.  Multifocal osteoblastic metastasis including 2.5 cm lesion in the right fifth rib posteriorly and a 1.6 cm lesion in the right third rib posteriorly. No lytic lesions identified.\"  - 2/6/19: CT-guided right sclerotic fifth rib lesion biopsy - \"Metastatic carcinoma, consistent with prostate primary.  Immunohistochemical stains performed show the metastatic carcinoma stains positive for NKX3.1 (prostate marker), negative for STACIE 3 and PAX8, which supports the above diagnosis.\"  - 2/15/19: Started Casodex 50mg every day and consented for the biobanking protocol. 3/18/19 - stopped Casodex.  - 2/19/19: Case discussed in tumor board - recommendation for nephectomy for suspected malignant right renal mass.   - 02/26/19: Started Lupron 22.5mg every 3 months. 05/29/19 - cycle 2.   - 5/8/19: Started Docetaxel 75mg/m2 IV every 3 weeks. 06/18/19 - cycle 3, 7/10/19 - cycle 4; 07/31/19 - cycle 5.      2. Stage III ([id number]), grade 3 of 4, clear-cell RCC of right kidney:  - Incidentally diagnosed as above.   - Underwent curative-intent robotic right radical nephrectomy with Dr. Wesley Cleveland on 3/19/19. No tumor spillage per op report.   - Path showed: \"Histologic Type: Clear cell renal cell carcinoma; Sarcomatoid Features: Not identified; Histologic Grade: Nucleolar grade 3 (WHO/ISUP). Extent Tumor Size: 4.3 cm. Microscopic Tumor Extension: Tumor extension into renal sinus (in vascular structures). Margins: Negative. Tumor Necrosis: Present; focal. Lymph-Vascular Invasion: Not identified.  Pathologic Staging (pTNM) Primary Tumor (pT): pT3a: Tumor extends into renal vein branches/renal sinus. Regional Lymph " "Nodes (pN): pNX. Number of Lymph Nodes Examined: 0 Distant Metastasis (pM): pM N/A.\"    PAST MEDICAL HISTORY:  Past Medical History:   Diagnosis Date     Cancer of kidney, right (H)      Complication of anesthesia     slow wakeup      Malignant neoplasm of prostate metastatic to bone (H) 2/14/2019     Thyroid disease      PAST SURGICAL HISTORY:   Past Surgical History:   Procedure Laterality Date     CYSTOSCOPY      about 10 years ago     INSERT PORT VASCULAR ACCESS Right 5/2/2019    Procedure: Chest Port Placement;  Surgeon: Tate Burciaga PA-C;  Location: [facility]     IR CHEST PORT PLACEMENT > 5 YRS OF AGE  5/2/2019     LAPAROSCOPIC NEPHRECTOMY Right 3/19/2019    Procedure: Right laparoscopic radical nephrectomy;  Surgeon: Wesley Cleveland MD;  Location: [facility] OR      SOCIAL HISTORY:   Social History     Tobacco Use     Smoking status: Never Smoker     Smokeless tobacco: Never Used   Substance Use Topics     Alcohol use: None     Comment: wine - very rare     Drug use: No     FAMILY HISTORY:   Family History   Problem Relation Age of Onset     Diabetes Father      ALLERGIES:   Allergies   Allergen Reactions     Doxycycline GI Disturbance     CURRENT MEDICATIONS:   Current Outpatient Medications:      amLODIPine (NORVASC) 10 MG tablet, Take 1 tablet (10 mg) by mouth daily, Disp: 30 tablet, Rfl: 0     atorvastatin (LIPITOR) 20 MG tablet, Take 20 mg by mouth daily, Disp: , Rfl:      calcium citrate-vitamin D (CITRACAL) 315-250 MG-UNIT TABS per tablet, Take 650 mg by mouth 2 times daily , Disp: , Rfl:      cetirizine (ZYRTEC) 10 MG tablet, Take 10 mg by mouth as needed, Disp: , Rfl:      cinnamon 500 MG CAPS, Take 500 mg by mouth daily , Disp: , Rfl:      cycloSPORINE (RESTASIS) 0.05 % ophthalmic emulsion, Place 1 drop into both eyes 2 times daily , Disp: , Rfl:      dexamethasone (DECADRON) 4 MG tablet, Take 8 mg by mouth 2 times daily Evening PRIOR and continue morning of Docetaxel, then 4 additional " doses.., Disp: 12 tablet, Rfl: 5     fluticasone (FLONASE) 50 MCG/ACT nasal spray, Spray 2 sprays in nostril daily as needed , Disp: , Rfl:      Glucosamine-Chondroit-Vit C-Mn (GLUCOSAMINE 1500 COMPLEX) CAPS, Take 1 capsule by mouth daily, Disp: , Rfl:      levothyroxine (SYNTHROID/LEVOTHROID) 125 MCG tablet, Take 125 mcg by mouth daily, Disp: , Rfl:      Multiple Vitamins-Minerals (MULTIVITAMIN ADULT EXTRA C PO), Take 1 tablet by mouth every 24 hours, Disp: , Rfl:      Nutritional Supplements (SALMON OIL) CAPS, Take 1 capsule by mouth daily, Disp: , Rfl:      omeprazole (PRILOSEC) 20 MG DR capsule, Take 20 mg by mouth daily, Disp: , Rfl:      prochlorperazine (COMPAZINE) 10 MG tablet, Take 1 tablet (10 mg) by mouth every 6 hours as needed (Nausea/Vomiting), Disp: 30 tablet, Rfl: 5  No current facility-administered medications for this visit.     Facility-Administered Medications Ordered in Other Visits:      0.9% sodium chloride BOLUS, 250 mL, Intravenous, Once, Uday Smalls MD     dexamethasone (DECADRON) 12 mg in sodium chloride 0.9 % intermittent infusion, 12 mg, Intravenous, Once, Uday Smalls MD     diphenhydrAMINE (BENADRYL) injection 50 mg, 50 mg, Intravenous, Once, Uday Smalls MD     DOCEtaxel (TAXOTERE) 178 mg in sodium chloride 0.9 % 258.9 mL CHEMOTHERAPY, 75 mg/m2 (Treatment Plan Recorded), Intravenous, Once, Uday Smalls MD     heparin 100 UNIT/ML injection 500 Units, 500 Units, Intravenous, Q8H, Uday Smalls MD     sodium chloride (PF) 0.9% PF flush 10 mL, 10 mL, Intravenous, Once, Uday Smalls MD    PHYSICAL EXAMINATION:  VITALS: /85 (BP Location: Right arm, Patient Position: Sitting, Cuff Size: Adult Large)   Pulse 86   Temp 98  F (36.7  C) (Oral)   Resp 16   Wt 108.5 kg (239 lb 4.8 oz)   SpO2 98%   BMI 32.45 kg/m      GENERAL: The patient is a pleasant male in no acute distress.HEENT: EOMI, PERRL. Sclerae are anicteric. Oral mucosa is pink and moist. Gingiva and buccal mucosa with mild edema  but no lesions or thrush.   Lymph: Neck is supple with no lymphadenopathy in the cervical or supraclavicular areas.   Heart: Regular rate and rhythm.   Lungs: Clear to auscultation bilaterally.   Abdomen: Bowel sounds present, soft, nontender with no palpable hepatosplenomegaly or masses.   Extremities: No lower extremity edema noted bilaterally.   Neuro: Alert and fully oriented, no focal deficits.  Skin: No rashes, petechiae, or bruising noted on exposed skin.  LABORATORY & IMAGING STUDIES:  Lab Test 07/31/19  1225 07/10/19  1332 06/18/19  1005   WBC 8.8 9.9 11.8*   RBC 4.30* 3.99* 4.14*   HGB 13.1* 12.1* 12.4*   HCT 39.5* 36.7* 36.7*   MCV 92 92 89   MCH 30.5 30.3 30.0   MCHC 33.2 33.0 33.8   RDW 13.9 15.0 15.4*    259 233   NEUTROPHIL 89.7 87.5 89.3    139 140   POTASSIUM 3.8 4.1 3.8   CHLORIDE 108 108 107   CO2 24 24 24   ANIONGAP 5 7 10   * 124* 136*   BUN 20 18 21   CR 1.07 1.11 1.04   BETHANY 9.2 9.3 9.3   PROTTOTAL 7.5 7.4 7.6   ALBUMIN 3.9 3.9 3.7   BILITOTAL 0.5 0.4 0.5   ALKPHOS 61 66 62   AST 16 20 20   ALT 27 27 30     Lab Test 07/31/19  1225 07/10/19  1332 06/18/19  1005 05/29/19  1217 04/30/19  1627 03/26/19  1604 02/26/19  1425   PSA 0.95 0.82 0.80 1.18 0.69 1.25 8.00*   CGAB  --  646* 646* 659* 1,309* 900* 654*   TESTOSTTOTAL  --  3*  --  <2* 7* 22* 462   CGAB - Chromogranin A; TESTOSTTOTAL: Total testosterone.    Lab Test 07/31/19  1225 07/10/19  1332 06/18/19  1005 05/29/19  1217    218 220 232*     CT CHEST/ABDOMEN/PELVIS W CONTRAST 7/2/2019 8:16 AM  COMPARISON: 5/3/2019.  FINDINGS: Chest: A hiatal hernia is again demonstrated. Sclerotic lesions involving C7 and T6 are less apparent today. Abdomen: Two subcentimeter liver lesions seen on axial images 53 and 59 haven't significantly changed from 1/25/2019. This stability is supportive evidence for a benign entity. The right kidney is surgically absent. Previously suspected postop change in the right surgical bed is less  "impressive today.  A small sclerotic focus involving L4 is unchanged. Pelvis: A small fat-containing right inguinal hernia is again seen.  Sclerotic foci involving the bony pelvis and left femoral neck are similar to the prior study. IMPRESSION:  Osseous lesions are stable to slightly less impressive compared to the prior study. No new findings.    NUCLEAR MEDICINE BONE SCAN WHOLE BODY  7/2/2019 10:53 AM  COMPARISON: bone scan: 5/3/2019   FINDINGS: There are multiple foci of abnormal increased activity involving the calvarium, ribs, left proximal femur, and cervicothoracic junction compatible with osseous metastasis. The distribution appears to be similar if not unchanged. Normal left renal activity is noted. No right renal activity is visible compatible with nephrectomy. IMPRESSION: 1. Multiple osseous metastasis, similar if not unchanged in  distribution compared to 5/3/2019. 2. Right nephrectomy.    ASSESSMENT & PLAN: Mr. Reyes is a delightful 59 year old gentleman with recently diagnosed metastatic castration sensitive prostate adenocarcinoma as well as an incidentally diagnosed stage III clear-cell renal cell carcinoma of right kidney (s/p radical R nephrectomy 3/19/19), who is here for a follow-up visit while undergoing docetaxel + ADT for prostate cancer.      1. Metastatic prostate cancer, with involvement of the bones and RPLN:   - Patient meets the criteria for CHAARTED \"high-volume\" metastatic hormone-sensitive prostate cancer.  - There were no available clinical trials for this gentleman at the time of diagnosis, especially given concurrent malignancy.  He was enrolled in the institutional biobanking study on 2/15/19 but this is a non-interventional study.   - After a review of systemic therapy options, patient opted to start docetaxel in combination with ADT (per JOSEFA, [id number], STAMPEDE).   - Reviewed restaging scan results from 7/2/19, that show likely ongoing partial response with " "\"slightly less impressive osseous lesions\" compared to the prior study in May 2019.  - PSA slightly higher today, but it can fluctuate during chemo - will monitor.   - Plan to continue with cycle 5 docetaxel today. Regimen will be docetaxel 75mg/m2 IV every 3 weeks for up to 6 cycles. No primary prophy G-CSF.  - He has a new small retinal vein occlusion and needs to start low-dose ASA (see below) - will check trough platelet next week to determine if this is safe.  - Patient developed a grade 1 allergic reaction with cycle 1 (tongue swelling). Dose interrupted and given solumedrol and benadryl. RN successfully able to reinitiate docetaxel and patient tolerated remainder of infusion well. Added dex and benadryl premeds for all subsequent cycles.  - Risks/benefits of docetaxel including allergic reaction, fatigue, nausea/vomiting/constipation, myelosuppression, neuropathy, LFT changes etc previously discussed in detail.   - Encouraged use of salt-soda rinses to prevent and treat mucositis.  - Continue Lupron 22.5mg every 3 months - last dose on 5/29/19 (next due in late August 2019).  - He understands the risks and benefits of ADT including hot flashes, mood changes and long-term cardiovascular, bone health, etc. Also understands the risks of docetaxel treatment including fatigue, nausea, vomiting, diarrhea/constipation, hand-foot syndrome, allergic reactions, myelosuppression with increased risk of infection and bleeding etc.  - Will repeat restaging scans after an additional 3 cycles.   - Will also add antiresorptive therapy based on restaging results after completion of chemo.      2. Stage 3, UISS-high risk ccRCC:  - Patient understands that he has a stage III, UISS high risk clear-cell renal cell carcinoma with approximately 40-50% risk of recurrence after definitive surgery.   - At this time, he has no clear evidence of residual disease.  The retroperitoneal lymphadenopathy is more consistent with metastatic " prostate cancer - this will need to be watched.  - He understands the role of adjuvant therapies in RCC and that at best, the data for adjuvant TKI therapy is mixed and shows an improvement in disease-free survival and not overall survival.   - Continue active surveillance with self-reporting for signs/symptoms of recurrence (this was previously explained in detail) and periodic H&P and scans.      3. Genetics referral:  - Was referred and seen by Genetics on 3/7/19. No evidence of inherited cancer syndromes found on work-up.      4. Research participation:  - Enrolled in  biobanking study.    5. Acute left retinal vein occlusion?  - Pt narrates a recent diagnosis of RVO per ophthalmologist. He was told that no surgery/intervention is needed and that this is a minor issue that will get better over time. He was given a choice of taking low-dose ASA to help with resolution of findings.  - From my standpoint, this requires weighing of risks and benefits from his ophthalmologist. From my perspective, it is very reasonable to start ASA today or in a week after checking platelet najma. Patient prefers the latter approach.  - Repeat platelet count next week and pt to call to seek input on whether it's safe to start ASA? If counts > 50K, it should be fine.    Return to clinic in 3 weeks.     BILLIN.     Uday Smalls M.D.  . Professor of Medicine  Genitourinary Oncology  Division of Hematology, Oncology & Transplantation  [facility]

## 2019-07-31 NOTE — PROGRESS NOTES
Infusion Nursing Note:  Walter PUENTES Leobardo Vincent presents today for C5D1 Taxotere.    Patient seen by provider today: Yes: Uday Smalls MD   present during visit today: Not Applicable.    Note: Patient feels well. No complaints made. PO Dexamethasone taken last night and this morning. Otherwise well.    Appointment is not made by the time patient left the facility. Instructed patient if unable to see next few days to call . Verbalized understanding.    Intravenous Access:  Implanted Port.    Treatment Conditions:  Lab Results   Component Value Date    HGB 13.1 07/31/2019     Lab Results   Component Value Date    WBC 8.8 07/31/2019      Lab Results   Component Value Date    ANEU 7.9 07/31/2019     Lab Results   Component Value Date     07/31/2019      Lab Results   Component Value Date     07/31/2019                   Lab Results   Component Value Date    POTASSIUM 3.8 07/31/2019           No results found for: MAG         Lab Results   Component Value Date    CR 1.07 07/31/2019                   Lab Results   Component Value Date    BETHANY 9.2 07/31/2019                Lab Results   Component Value Date    BILITOTAL 0.5 07/31/2019           Lab Results   Component Value Date    ALBUMIN 3.9 07/31/2019                    Lab Results   Component Value Date    ALT 27 07/31/2019           Lab Results   Component Value Date    AST 16 07/31/2019       Results reviewed, labs MET treatment parameters, ok to proceed with treatment.      Post Infusion Assessment:  Patient tolerated infusion without incident.  Blood return noted pre and post infusion.  Site patent and intact, free from redness, edema or discomfort.  No evidence of extravasations.  Access discontinued per protocol.       Discharge Plan:   Prescription refills given for Dexamethasone.  Discharge instructions reviewed with: Patient and Family.  Patient and/or family verbalized understanding of discharge instructions and all questions  answered.  Copy of AVS reviewed with patient and/or family.  Patient will return 3 weeks for next appointment.  Patient discharged in stable condition accompanied by: self.  Departure Mode: Ambulatory.    ANDRESSA GAMBOA RN

## 2019-08-02 LAB — CGA SERPL-MCNC: 509 NG/ML (ref 0–95)

## 2019-08-03 LAB — TESTOST SERPL-MCNC: 2 NG/DL (ref 240–950)

## 2019-08-21 ENCOUNTER — INFUSION THERAPY VISIT (OUTPATIENT)
Dept: ONCOLOGY | Facility: CLINIC | Age: 59
End: 2019-08-21
Attending: INTERNAL MEDICINE
Payer: COMMERCIAL

## 2019-08-21 ENCOUNTER — APPOINTMENT (OUTPATIENT)
Dept: LAB | Facility: CLINIC | Age: 59
End: 2019-08-21
Attending: INTERNAL MEDICINE
Payer: COMMERCIAL

## 2019-08-21 VITALS
RESPIRATION RATE: 16 BRPM | SYSTOLIC BLOOD PRESSURE: 127 MMHG | TEMPERATURE: 98.7 F | WEIGHT: 238.9 LBS | DIASTOLIC BLOOD PRESSURE: 91 MMHG | OXYGEN SATURATION: 95 % | BODY MASS INDEX: 32.4 KG/M2 | HEART RATE: 83 BPM

## 2019-08-21 DIAGNOSIS — C79.51 MALIGNANT NEOPLASM OF PROSTATE METASTATIC TO BONE (H): ICD-10-CM

## 2019-08-21 DIAGNOSIS — C61 MALIGNANT NEOPLASM OF PROSTATE METASTATIC TO BONE (H): ICD-10-CM

## 2019-08-21 DIAGNOSIS — C79.51 MALIGNANT NEOPLASM OF PROSTATE METASTATIC TO BONE (H): Primary | ICD-10-CM

## 2019-08-21 DIAGNOSIS — C61 MALIGNANT NEOPLASM OF PROSTATE METASTATIC TO BONE (H): Primary | ICD-10-CM

## 2019-08-21 LAB
ALBUMIN SERPL-MCNC: 3.8 G/DL (ref 3.4–5)
ALP SERPL-CCNC: 52 U/L (ref 40–150)
ALT SERPL W P-5'-P-CCNC: 29 U/L (ref 0–70)
ANION GAP SERPL CALCULATED.3IONS-SCNC: 6 MMOL/L (ref 3–14)
AST SERPL W P-5'-P-CCNC: 15 U/L (ref 0–45)
BASOPHILS # BLD AUTO: 0 10E9/L (ref 0–0.2)
BASOPHILS NFR BLD AUTO: 0.1 %
BILIRUB SERPL-MCNC: 0.4 MG/DL (ref 0.2–1.3)
BUN SERPL-MCNC: 20 MG/DL (ref 7–30)
CALCIUM SERPL-MCNC: 9.2 MG/DL (ref 8.5–10.1)
CHLORIDE SERPL-SCNC: 108 MMOL/L (ref 94–109)
CO2 SERPL-SCNC: 26 MMOL/L (ref 20–32)
CREAT SERPL-MCNC: 1.03 MG/DL (ref 0.66–1.25)
DIFFERENTIAL METHOD BLD: ABNORMAL
EOSINOPHIL # BLD AUTO: 0 10E9/L (ref 0–0.7)
EOSINOPHIL NFR BLD AUTO: 0 %
ERYTHROCYTE [DISTWIDTH] IN BLOOD BY AUTOMATED COUNT: 13.6 % (ref 10–15)
GFR SERPL CREATININE-BSD FRML MDRD: 80 ML/MIN/{1.73_M2}
GLUCOSE SERPL-MCNC: 130 MG/DL (ref 70–99)
HCT VFR BLD AUTO: 38.6 % (ref 40–53)
HGB BLD-MCNC: 12.9 G/DL (ref 13.3–17.7)
IMM GRANULOCYTES # BLD: 0.1 10E9/L (ref 0–0.4)
IMM GRANULOCYTES NFR BLD: 0.5 %
LDH SERPL L TO P-CCNC: 213 U/L (ref 85–227)
LYMPHOCYTES # BLD AUTO: 1 10E9/L (ref 0.8–5.3)
LYMPHOCYTES NFR BLD AUTO: 9.2 %
MCH RBC QN AUTO: 30.2 PG (ref 26.5–33)
MCHC RBC AUTO-ENTMCNC: 33.4 G/DL (ref 31.5–36.5)
MCV RBC AUTO: 90 FL (ref 78–100)
MONOCYTES # BLD AUTO: 0.1 10E9/L (ref 0–1.3)
MONOCYTES NFR BLD AUTO: 1.1 %
NEUTROPHILS # BLD AUTO: 9.5 10E9/L (ref 1.6–8.3)
NEUTROPHILS NFR BLD AUTO: 89.1 %
NRBC # BLD AUTO: 0 10*3/UL
NRBC BLD AUTO-RTO: 0 /100
PLATELET # BLD AUTO: 191 10E9/L (ref 150–450)
POTASSIUM SERPL-SCNC: 3.8 MMOL/L (ref 3.4–5.3)
PROT SERPL-MCNC: 7.2 G/DL (ref 6.8–8.8)
PSA SERPL-MCNC: 0.71 UG/L (ref 0–4)
RBC # BLD AUTO: 4.27 10E12/L (ref 4.4–5.9)
SODIUM SERPL-SCNC: 141 MMOL/L (ref 133–144)
WBC # BLD AUTO: 10.6 10E9/L (ref 4–11)

## 2019-08-21 PROCEDURE — 84153 ASSAY OF PSA TOTAL: CPT | Performed by: INTERNAL MEDICINE

## 2019-08-21 PROCEDURE — 86316 IMMUNOASSAY TUMOR OTHER: CPT | Performed by: INTERNAL MEDICINE

## 2019-08-21 PROCEDURE — 25000128 H RX IP 250 OP 636: Mod: ZF | Performed by: INTERNAL MEDICINE

## 2019-08-21 PROCEDURE — 85025 COMPLETE CBC W/AUTO DIFF WBC: CPT | Performed by: INTERNAL MEDICINE

## 2019-08-21 PROCEDURE — 96375 TX/PRO/DX INJ NEW DRUG ADDON: CPT

## 2019-08-21 PROCEDURE — 80053 COMPREHEN METABOLIC PANEL: CPT | Performed by: INTERNAL MEDICINE

## 2019-08-21 PROCEDURE — G0463 HOSPITAL OUTPT CLINIC VISIT: HCPCS | Mod: ZF

## 2019-08-21 PROCEDURE — 96413 CHEMO IV INFUSION 1 HR: CPT

## 2019-08-21 PROCEDURE — 25800030 ZZH RX IP 258 OP 636: Mod: ZF | Performed by: INTERNAL MEDICINE

## 2019-08-21 PROCEDURE — 99215 OFFICE O/P EST HI 40 MIN: CPT | Mod: ZP | Performed by: INTERNAL MEDICINE

## 2019-08-21 PROCEDURE — 84403 ASSAY OF TOTAL TESTOSTERONE: CPT | Performed by: INTERNAL MEDICINE

## 2019-08-21 PROCEDURE — 83615 LACTATE (LD) (LDH) ENZYME: CPT | Performed by: INTERNAL MEDICINE

## 2019-08-21 PROCEDURE — 96402 CHEMO HORMON ANTINEOPL SQ/IM: CPT

## 2019-08-21 RX ORDER — ALBUTEROL SULFATE 90 UG/1
1-2 AEROSOL, METERED RESPIRATORY (INHALATION)
Status: CANCELLED
Start: 2019-08-21

## 2019-08-21 RX ORDER — SODIUM CHLORIDE 9 MG/ML
1000 INJECTION, SOLUTION INTRAVENOUS CONTINUOUS PRN
Status: CANCELLED
Start: 2019-08-21

## 2019-08-21 RX ORDER — HEPARIN SODIUM (PORCINE) LOCK FLUSH IV SOLN 100 UNIT/ML 100 UNIT/ML
500 SOLUTION INTRAVENOUS EVERY 8 HOURS
Status: DISCONTINUED | OUTPATIENT
Start: 2019-08-21 | End: 2019-08-21 | Stop reason: HOSPADM

## 2019-08-21 RX ORDER — LORAZEPAM 2 MG/ML
0.5 INJECTION INTRAMUSCULAR EVERY 4 HOURS PRN
Status: CANCELLED
Start: 2019-08-21

## 2019-08-21 RX ORDER — EPINEPHRINE 0.3 MG/.3ML
0.3 INJECTION SUBCUTANEOUS EVERY 5 MIN PRN
Status: CANCELLED | OUTPATIENT
Start: 2019-08-21

## 2019-08-21 RX ORDER — MEPERIDINE HYDROCHLORIDE 25 MG/ML
25 INJECTION INTRAMUSCULAR; INTRAVENOUS; SUBCUTANEOUS EVERY 30 MIN PRN
Status: CANCELLED | OUTPATIENT
Start: 2019-08-21

## 2019-08-21 RX ORDER — ALBUTEROL SULFATE 0.83 MG/ML
2.5 SOLUTION RESPIRATORY (INHALATION)
Status: CANCELLED | OUTPATIENT
Start: 2019-08-21

## 2019-08-21 RX ORDER — HEPARIN SODIUM (PORCINE) LOCK FLUSH IV SOLN 100 UNIT/ML 100 UNIT/ML
5 SOLUTION INTRAVENOUS
Status: COMPLETED | OUTPATIENT
Start: 2019-08-21 | End: 2019-08-21

## 2019-08-21 RX ORDER — METHYLPREDNISOLONE SODIUM SUCCINATE 125 MG/2ML
125 INJECTION, POWDER, LYOPHILIZED, FOR SOLUTION INTRAMUSCULAR; INTRAVENOUS
Status: CANCELLED
Start: 2019-08-21

## 2019-08-21 RX ORDER — EPINEPHRINE 1 MG/ML
0.3 INJECTION, SOLUTION INTRAMUSCULAR; SUBCUTANEOUS EVERY 5 MIN PRN
Status: CANCELLED | OUTPATIENT
Start: 2019-08-21

## 2019-08-21 RX ORDER — DIPHENHYDRAMINE HYDROCHLORIDE 50 MG/ML
50 INJECTION INTRAMUSCULAR; INTRAVENOUS
Status: CANCELLED
Start: 2019-08-21

## 2019-08-21 RX ORDER — HEPARIN SODIUM (PORCINE) LOCK FLUSH IV SOLN 100 UNIT/ML 100 UNIT/ML
500 SOLUTION INTRAVENOUS EVERY 8 HOURS
Status: CANCELLED
Start: 2019-08-21

## 2019-08-21 RX ORDER — DIPHENHYDRAMINE HYDROCHLORIDE 50 MG/ML
50 INJECTION INTRAMUSCULAR; INTRAVENOUS ONCE
Status: CANCELLED
Start: 2019-08-21

## 2019-08-21 RX ADMIN — DIPHENHYDRAMINE HYDROCHLORIDE: 50 INJECTION, SOLUTION INTRAMUSCULAR; INTRAVENOUS at 16:15

## 2019-08-21 RX ADMIN — DOCETAXEL 180 MG: 80 INJECTION, SOLUTION, CONCENTRATE INTRAVENOUS at 16:52

## 2019-08-21 RX ADMIN — HEPARIN 500 UNITS: 100 SYRINGE at 18:06

## 2019-08-21 RX ADMIN — HEPARIN SODIUM (PORCINE) LOCK FLUSH IV SOLN 100 UNIT/ML 5 ML: 100 SOLUTION at 14:29

## 2019-08-21 RX ADMIN — SODIUM CHLORIDE 1000 ML: 9 INJECTION, SOLUTION INTRAVENOUS at 16:14

## 2019-08-21 RX ADMIN — DEXAMETHASONE SODIUM PHOSPHATE 12 MG: 10 INJECTION, SOLUTION INTRAMUSCULAR; INTRAVENOUS at 16:31

## 2019-08-21 RX ADMIN — LEUPROLIDE ACETATE 22.5 MG: KIT at 17:06

## 2019-08-21 ASSESSMENT — PAIN SCALES - GENERAL: PAINLEVEL: NO PAIN (0)

## 2019-08-21 NOTE — PROGRESS NOTES
"MEDICAL ONCOLOGY FOLLOW-UP NOTE    REFERRING PROVIDER: Jorge Alberto Yepez MD at [facility].    REASON FOR VISIT: Evaluation before cycle 6 of docetaxel + ADT for metastatic hormone-sensitive prostate cancer.    HISTORY OF PRESENT ILLNESS: Mr. 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 His oncologic history is detailed below.     Walter reports an increasing burden of symptoms including nausea and fatigue, with the latter lasting approx 1.5 weeks. Slightly increased neuropathy in the bilateral thumbs, mild numbness with a little bit of pain, not overly bothersome (likely still grade 1). Also with some neuropathy in the the toes bilaterally as well. Noted additional hair loss. He notes mild pain in the areas where he knows he has bony disease - posterior chest wall and left hip primarily. No new areas of pain. Using extra-strength Tylenol with only modest benefit. He denies any low-grade temps or fevers. No hematuria, SOB/cough, or rashes. Chronic obstructive urinary symptoms unchanged during the daytime, but now more bothersome at night. Nocturia x2-3, with difficulty voiding completely. He wonders about starting Flomax. Wants to continue with docetaxel chemotherapy.     ONCOLOGIC HISTORY:  1. Prostate adenocarcinoma, stage IV (M1b at diagnosis), high-volume, castration-sensitive:  - 12/13/2018: PSA found to be elevated to 9.1 ng/mL on a routine follow-up with primary care provider Dr. Naylor at [facility]. Prior PSA were 1.4 on 8/16/17, 2.4 on 6/28/16, 2.9 on 6/28/16, and as low as 0.4 on 3/26/2003.   - 1/08/2019: Consultation with Sarah Wilson CNP in Urology clinic. Repeat PSA 9.6.  - 1/16/2019: MRI prostate with contrast - \"This examination is characterized as PIRADS 5- very high probability. Clinically significant " "cancer is highly likely to be present. There is a large, invasive mass arising from the right peripheral zone and extending into the neurovascular bundle, seminal vesicle, and along the anterolateral right mesorectal fascia. Metastatic right external iliac lymph node. Metastatic lesion in the posterior/superior right acetabulum.\"  - 1/25/2019: CT abdomen and pelvis with IV contrast - \"1. Heterogeneous enhancing mass posteriorly in the upper pole of the right kidney measures 4.5 x 5.8 x 5.7 cm (AP by transverse by craniocaudal). It has a small nodular component extending posterior medially which abuts the right psoas muscle. This nodular extension measures 2.1 x 2.1 cm. Minimal stranding about the mass. No definite thrombus within the right renal vein. This renal mass is compatible with a renal cell carcinoma. A paraaortic lymph node situated immediately posterior to the left renal vein measures 1.1 cm in short axis, suspicious for metastasis. 2. A 2 cm right external iliac lymph node is also suspicious for metastases. 3. Multiple sclerotic osseous lesions suspicious for metastases. These include 0.8 and 0.6 cm sclerotic lesions laterally in the right iliac wing (images 53 and 62 respectively). Ill-defined groundglass density laterally in the right acetabulum measuring 1.7 x 1.2 cm corresponds with the lesion identified on MRI. A 0.4 cm groundglass density in the left acetabulum. Sclerotic lesion in the left femoral neck measures 0.9 x 1.6 cm (image 81). Sclerotic metastases would be more compatible with prostate metastases. 4. A 3 subcentimeter hepatic lesions are indeterminate. Metastases would be a consideration. These can be further characterized with liver MRI.\"  - 1/25/2019: NM bone scan - \"There is focal bony uptake in the left femoral neck, right acetabulum, right of midline at the S1 or L5 level of the spine, within multiple bilateral ribs, in the C7 or T1 level of the spine, and anteriorly within the skull. " "Findings are suspicious for metastases.\"  - 1/31/19: CT chest with contrast - \" No suspicious nodules in the chest.  Stable appearance of a 5.8 cm right renal mass concerning for renal carcinoma until proven otherwise.  Stable indeterminant subcentimeter hypodensities in the liver.  Multifocal osteoblastic metastasis including 2.5 cm lesion in the right fifth rib posteriorly and a 1.6 cm lesion in the right third rib posteriorly. No lytic lesions identified.\"  - 2/6/19: CT-guided right sclerotic fifth rib lesion biopsy - \"Metastatic carcinoma, consistent with prostate primary.  Immunohistochemical stains performed show the metastatic carcinoma stains positive for NKX3.1 (prostate marker), negative for STACIE 3 and PAX8, which supports the above diagnosis.\"  - 2/15/19: Started Casodex 50mg every day and consented for the biobanking protocol. 3/18/19 - stopped Casodex.  - 2/19/19: Case discussed in tumor board - recommendation for nephectomy for suspected malignant right renal mass.   - 2/26/19: Started Lupron 22.5mg every 3 months.   - 5/8/19: Started Docetaxel 75mg/m2 IV every 3 weeks. 06/18/19 - cycle 3, 7/10/19 - cycle 4, 07/31/19 - cycle 5, 08/21/19 - cycle 6.      2. Stage III ([id number]), grade 3 of 4, clear-cell RCC of right kidney:  - Incidentally diagnosed as above.   - Underwent curative-intent robotic right radical nephrectomy with Dr. Wesley Cleveland on 3/19/19. No tumor spillage per op report.   - Path showed: \"Histologic Type: Clear cell renal cell carcinoma; Sarcomatoid Features: Not identified; Histologic Grade: Nucleolar grade 3 (WHO/ISUP). Extent Tumor Size: 4.3 cm. Microscopic Tumor Extension: Tumor extension into renal sinus (in vascular structures). Margins: Negative. Tumor Necrosis: Present; focal. Lymph-Vascular Invasion: Not identified.  Pathologic Staging (pTNM) Primary Tumor (pT): pT3a: Tumor extends into renal vein branches/renal sinus. Regional Lymph Nodes (pN): pNX. Number of Lymph Nodes " "Examined: 0 Distant Metastasis (pM): pM N/A.\"    PAST MEDICAL HISTORY:  Past Medical History:   Diagnosis Date     Cancer of kidney, right (H)      Complication of anesthesia     slow wakeup      Malignant neoplasm of prostate metastatic to bone (H) 2/14/2019     Thyroid disease      PAST SURGICAL HISTORY:   Past Surgical History:   Procedure Laterality Date     CYSTOSCOPY      about 10 years ago     INSERT PORT VASCULAR ACCESS Right 5/2/2019    Procedure: Chest Port Placement;  Surgeon: Tate Burciaga PA-C;  Location: [facility] OR     IR CHEST PORT PLACEMENT > 5 YRS OF AGE  5/2/2019     LAPAROSCOPIC NEPHRECTOMY Right 3/19/2019    Procedure: Right laparoscopic radical nephrectomy;  Surgeon: Wesley Cleveland MD;  Location: [facility] OR      SOCIAL HISTORY:   Social History     Tobacco Use     Smoking status: Never Smoker     Smokeless tobacco: Never Used   Substance Use Topics     Alcohol use: None     Comment: wine - very rare     Drug use: No     FAMILY HISTORY:   Family History   Problem Relation Age of Onset     Diabetes Father      ALLERGIES:   Allergies   Allergen Reactions     Doxycycline GI Disturbance     CURRENT MEDICATIONS:   Current Outpatient Medications:      amLODIPine (NORVASC) 10 MG tablet, Take 1 tablet (10 mg) by mouth daily, Disp: 30 tablet, Rfl: 0     atorvastatin (LIPITOR) 20 MG tablet, Take 20 mg by mouth daily, Disp: , Rfl:      calcium citrate-vitamin D (CITRACAL) 315-250 MG-UNIT TABS per tablet, Take 650 mg by mouth 2 times daily , Disp: , Rfl:      cetirizine (ZYRTEC) 10 MG tablet, Take 10 mg by mouth as needed, Disp: , Rfl:      cinnamon 500 MG CAPS, Take 500 mg by mouth daily , Disp: , Rfl:      cycloSPORINE (RESTASIS) 0.05 % ophthalmic emulsion, Place 1 drop into both eyes 2 times daily , Disp: , Rfl:      dexamethasone (DECADRON) 4 MG tablet, Take 8 mg by mouth 2 times daily Evening PRIOR and continue morning of Docetaxel, then 4 additional doses.., Disp: 12 tablet, Rfl: 5     " fluticasone (FLONASE) 50 MCG/ACT nasal spray, Spray 2 sprays in nostril daily as needed , Disp: , Rfl:      Glucosamine-Chondroit-Vit C-Mn (GLUCOSAMINE 1500 COMPLEX) CAPS, Take 1 capsule by mouth daily, Disp: , Rfl:      levothyroxine (SYNTHROID/LEVOTHROID) 125 MCG tablet, Take 125 mcg by mouth daily, Disp: , Rfl:      Multiple Vitamins-Minerals (MULTIVITAMIN ADULT EXTRA C PO), Take 1 tablet by mouth every 24 hours, Disp: , Rfl:      Nutritional Supplements (SALMON OIL) CAPS, Take 1 capsule by mouth daily, Disp: , Rfl:      omeprazole (PRILOSEC) 20 MG DR capsule, Take 20 mg by mouth daily, Disp: , Rfl:      prochlorperazine (COMPAZINE) 10 MG tablet, Take 1 tablet (10 mg) by mouth every 6 hours as needed (Nausea/Vomiting), Disp: 30 tablet, Rfl: 5    Current Facility-Administered Medications:      sodium chloride (PF) 0.9% PF flush 20 mL, 20 mL, Intracatheter, Q1H PRN, Uday Smalls MD, 20 mL at 08/21/19 1429    PHYSICAL EXAMINATION:  VITALS: BP (!) 127/91 (BP Location: Right arm, Patient Position: Sitting, Cuff Size: Adult Large)   Pulse 83   Temp 98.7  F (37.1  C) (Oral)   Resp 16   Wt 108.4 kg (238 lb 14.4 oz)   SpO2 95%   BMI 32.40 kg/m     GENERAL: The patient is a pleasant male in no acute distress.  HEENT: EOMI. Sclerae are anicteric. Oral mucosa is pink and moist. Gingiva and buccal mucosa with mild edema but no lesions or thrush.   Lymph: Neck is supple with no lymphadenopathy in the cervical or supraclavicular areas.   Heart: Regular rate and rhythm.   Lungs: Clear to auscultation bilaterally. Normal respiratory effort.  Abdomen: Bowel sounds present, soft, nontender with no palpable hepatosplenomegaly or masses.   Extremities: No lower extremity edema noted bilaterally  Neuro: Alert and fully oriented, no focal deficits.  Skin: No rashes, petechiae, or bruising noted on exposed skin.    LABORATORY & IMAGING STUDIES:  Lab Test 08/21/19  1436 07/31/19  1225 07/10/19  1332   WBC 10.6 8.8 9.9   RBC 4.27*  "4.30* 3.99*   HGB 12.9* 13.1* 12.1*   HCT 38.6* 39.5* 36.7*   MCV 90 92 92   MCH 30.2 30.5 30.3   MCHC 33.4 33.2 33.0   RDW 13.6 13.9 15.0    200 259   NEUTROPHIL 89.1 89.7 87.5     Lab Test 08/21/19  1436 07/31/19  1225 07/10/19  1332    138 139   POTASSIUM 3.8 3.8 4.1   CHLORIDE 108 108 108   CO2 26 24 24   ANIONGAP 6 5 7   * 134* 124*   BUN 20 20 18   CR 1.03 1.07 1.11   BETHANY 9.2 9.2 9.3     Lab Test 08/21/19  1436 07/31/19  1225 07/10/19  1332   PROTTOTAL 7.2 7.5 7.4   ALBUMIN 3.8 3.9 3.9   BILITOTAL 0.4 0.5 0.4   ALKPHOS 52 61 66   AST 15 16 20   ALT 29 27 27      Lab Test 08/21/19  1436 07/31/19  1225 07/10/19  1332 06/18/19  1005 05/29/19  1217 04/30/19  1627 03/26/19  1604   PSA 0.71 0.95 0.82 0.80 1.18 0.69 1.25   CGAB  --  509* 646* 646* 659* 1,309* 900*   TESTOSTTOTAL  --  2* 3*  --  <2* 7* 22*   CGAB - Chromogranin A; TESTOSTTOTAL: Total testosterone.    IMAGING:  Previously reviewed CT CAP and NM Bone Scan from 7/2/19:  - Noted sclerotic lesions at C7 and T6 (less apparent), two very small liver lesions unchanged, sclerotic focus at L4 unchanged, sclerotic foci in bony pelvis and left femoral neck unchanged.  - Osseous lesions are stable to slightly less impressive compared to the prior study. No new findings.    ASSESSMENT & PLAN: Mr. Reyes is a delightful 59 year old gentleman with recently diagnosed metastatic castration sensitive prostate adenocarcinoma as well as an incidentally diagnosed stage III clear-cell renal cell carcinoma of the right kidney (s/p radical R nephrectomy 3/19/19), who is here for a follow-up visit while undergoing docetaxel + ADT for prostate cancer.      1. Metastatic prostate cancer, with involvement of the bones and RPLN:   - Patient meets the criteria for CHAARTED \"high-volume\" metastatic hormone-sensitive prostate cancer.  - There were no available clinical trials for this gentleman at the time of diagnosis, especially given concurrent " "malignancy.  He was enrolled in the institutional biobanking study on 2/15/19 but this is a non-interventional study.   - After a review of systemic therapy options, patient opted to start docetaxel in combination with ADT (per SHAVONNE RIVERO-AFU15, KARLA).   - We previously reviewed restaging scan results from 7/2/19, that showed likely ongoing partial response with \"slightly less impressive osseous lesions\" compared to the prior study in May 2019.  - PSA continues a gradual down trend with some variability which is not overly concerning at this time.   - Plan to continue with cycle 6 docetaxel today. Regimen will be docetaxel 75mg/m2 IV every 3 weeks for up to 6 cycles. No primary prophy G-CSF.   - Will add 1 L IV fluid with this cycle given report of low BPs (80s/60s) in the days following chemo. Discussed staying hydrated with electrolyte balanced fluids rather than free water.  - Patient developed a grade 1 allergic reaction with cycle 1 (tongue swelling). Dose interrupted and given solumedrol and benadryl. RN successfully able to reinitiate docetaxel and patient tolerated remainder of infusion well. Added dex and benadryl premeds for all subsequent cycles.  - Risks/benefits of docetaxel including allergic reaction, fatigue, nausea/vomiting/constipation, myelosuppression, neuropathy, LFT changes etc previously discussed in detail.   - Encouraged use of salt-soda rinses to prevent and treat mucositis.  - Continue Lupron 22.5mg every 3 months - due today (last given 5/29/19).  - He understands the risks and benefits of ADT including hot flashes, mood changes and long-term cardiovascular, bone health, etc. Also understands the risks of docetaxel treatment including fatigue, nausea, vomiting, diarrhea/constipation, hand-foot syndrome, allergic reactions, myelosuppression with increased risk of infection and bleeding etc.  - Will repeat restaging scans 4-6 weeks after completion of chemotherapy.   - Will also " add antiresorptive therapy based on restaging results after completion of chemo.      2. Stage 3, UISS-high risk ccRCC:  - Patient understands that he has a stage III, UISS high risk clear-cell renal cell carcinoma with approximately 40-50% risk of recurrence after definitive surgery.   - At this time, he has no clear evidence of residual disease.  The retroperitoneal lymphadenopathy is more consistent with metastatic prostate cancer - this will need to be watched.  - He understands the role of adjuvant therapies in RCC and that at best, the data for adjuvant TKI therapy is mixed and shows an improvement in disease-free survival and not overall survival.   - Continue active surveillance with self-reporting for signs/symptoms of recurrence (this was previously explained in detail) and periodic H&P and scans.      3. Genetics referral:  - Was referred and seen by Genetics on 3/7/19. No evidence of inherited cancer syndromes found on work-up.      4. Research participation:  - Enrolled in  [employer] study.    5. Acute left retinal vein occlusion.  - Recent diagnosis of RVO per ophthalmologist. He was told that no surgery/intervention is needed and that this is a minor issue that will get better over time. He was given a choice of taking low-dose ASA to help with resolution, but opted not to start this and was told that it was already resolving.    6. Obstructive urinary symptoms.  - Longstanding obstructive symptoms, but now more bothersome nocturia x2-3. He inquired about starting Flomax.  - We discussed how Flomax works, and the possible side effect of orthostatic hypotension. Given his reported low BPs in the days following chemo, will defer starting Flomax for now. Can revisit this topic over the next few weeks.    Return to clinic in a ~6-8 weeks with re-staging scans, sooner TAHMINA visit if needed.     Patient was seen and discussed with Dr. Uday Smalls.    Holly Warren MD/PhD  Heme/Onc Fellow       ATTENDING  ATTESTATION NOTE:  I evaluated the patient and discussed and reviewed the case with the fellow, Dr. Holly Warren. I agree with the findings as documented in her note and have modified the contents above to accurately reflect my assessment and plan.      BILLIN.     Uday Smalls M.D.  . Professor of Medicine  Genitourinary Oncology  Division of Hematology, Oncology & Transplantation  [facility]

## 2019-08-21 NOTE — LETTER
8/21/2019       RE: Walter Reyes  [address] [facility] [id number]     Dear Colleague,    Thank you for referring your patient, Walter Reyes, to the [facility]. Please see a copy of my visit note below.    MEDICAL ONCOLOGY FOLLOW-UP NOTE    REFERRING PROVIDER: Jorge Alberto Yepez MD at [facility].    REASON FOR VISIT: Evaluation before cycle 6 of docetaxel + ADT for metastatic hormone-sensitive prostate cancer.    HISTORY OF PRESENT ILLNESS: Mr. 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 His oncologic history is detailed below.     Walter reports an increasing burden of symptoms including nausea and fatigue, with the latter lasting approx 1.5 weeks. Slightly increased neuropathy in the bilateral thumbs, mild numbness with a little bit of pain, not overly bothersome (likely still grade 1). Also with some neuropathy in the the toes bilaterally as well. Noted additional hair loss. He notes mild pain in the areas where he knows he has bony disease - posterior chest wall and left hip primarily. No new areas of pain. Using extra-strength Tylenol with only modest benefit. He denies any low-grade temps or fevers. No hematuria, SOB/cough, or rashes. Chronic obstructive urinary symptoms unchanged during the daytime, but now more bothersome at night. Nocturia x2-3, with difficulty voiding completely. He wonders about starting Flomax. Wants to continue with docetaxel chemotherapy.     ONCOLOGIC HISTORY:  1. Prostate adenocarcinoma, stage IV (M1b at diagnosis), high-volume, castration-sensitive:  - 12/13/2018: PSA found to be elevated to 9.1 ng/mL on a routine follow-up with primary care provider Dr. Naylor at [facility]. Prior PSA were 1.4 on 8/16/17, 2.4 on 6/28/16, 2.9 on 6/28/16,  "and as low as 0.4 on 3/26/2003.   - 1/08/2019: Consultation with Sarah Wilson CNP in Urology clinic. Repeat PSA 9.6.  - 1/16/2019: MRI prostate with contrast - \"This examination is characterized as PIRADS 5- very high probability. Clinically significant cancer is highly likely to be present. There is a large, invasive mass arising from the right peripheral zone and extending into the neurovascular bundle, seminal vesicle, and along the anterolateral right mesorectal fascia. Metastatic right external iliac lymph node. Metastatic lesion in the posterior/superior right acetabulum.\"  - 1/25/2019: CT abdomen and pelvis with IV contrast - \"1. Heterogeneous enhancing mass posteriorly in the upper pole of the right kidney measures 4.5 x 5.8 x 5.7 cm (AP by transverse by craniocaudal). It has a small nodular component extending posterior medially which abuts the right psoas muscle. This nodular extension measures 2.1 x 2.1 cm. Minimal stranding about the mass. No definite thrombus within the right renal vein. This renal mass is compatible with a renal cell carcinoma. A paraaortic lymph node situated immediately posterior to the left renal vein measures 1.1 cm in short axis, suspicious for metastasis. 2. A 2 cm right external iliac lymph node is also suspicious for metastases. 3. Multiple sclerotic osseous lesions suspicious for metastases. These include 0.8 and 0.6 cm sclerotic lesions laterally in the right iliac wing (images 53 and 62 respectively). Ill-defined groundglass density laterally in the right acetabulum measuring 1.7 x 1.2 cm corresponds with the lesion identified on MRI. A 0.4 cm groundglass density in the left acetabulum. Sclerotic lesion in the left femoral neck measures 0.9 x 1.6 cm (image 81). Sclerotic metastases would be more compatible with prostate metastases. 4. A 3 subcentimeter hepatic lesions are indeterminate. Metastases would be a consideration. These can be further characterized with liver " "MRI.\"  - 1/25/2019: NM bone scan - \"There is focal bony uptake in the left femoral neck, right acetabulum, right of midline at the S1 or L5 level of the spine, within multiple bilateral ribs, in the C7 or T1 level of the spine, and anteriorly within the skull. Findings are suspicious for metastases.\"  - 1/31/19: CT chest with contrast - \" No suspicious nodules in the chest.  Stable appearance of a 5.8 cm right renal mass concerning for renal carcinoma until proven otherwise.  Stable indeterminant subcentimeter hypodensities in the liver.  Multifocal osteoblastic metastasis including 2.5 cm lesion in the right fifth rib posteriorly and a 1.6 cm lesion in the right third rib posteriorly. No lytic lesions identified.\"  - 2/6/19: CT-guided right sclerotic fifth rib lesion biopsy - \"Metastatic carcinoma, consistent with prostate primary.  Immunohistochemical stains performed show the metastatic carcinoma stains positive for NKX3.1 (prostate marker), negative for STACIE 3 and PAX8, which supports the above diagnosis.\"  - 2/15/19: Started Casodex 50mg every day and consented for the biobanking protocol. 3/18/19 - stopped Casodex.  - 2/19/19: Case discussed in tumor board - recommendation for nephectomy for suspected malignant right renal mass.   - 2/26/19: Started Lupron 22.5mg every 3 months.   - 5/8/19: Started Docetaxel 75mg/m2 IV every 3 weeks. 06/18/19 - cycle 3, 7/10/19 - cycle 4, 07/31/19 - cycle 5, 08/21/19 - cycle 6.      2. Stage III ([id number]), grade 3 of 4, clear-cell RCC of right kidney:  - Incidentally diagnosed as above.   - Underwent curative-intent robotic right radical nephrectomy with Dr. Wesley Cleveland on 3/19/19. No tumor spillage per op report.   - Path showed: \"Histologic Type: Clear cell renal cell carcinoma; Sarcomatoid Features: Not identified; Histologic Grade: Nucleolar grade 3 (WHO/ISUP). Extent Tumor Size: 4.3 cm. Microscopic Tumor Extension: Tumor extension into renal sinus (in vascular " "structures). Margins: Negative. Tumor Necrosis: Present; focal. Lymph-Vascular Invasion: Not identified.  Pathologic Staging (pTNM) Primary Tumor (pT): pT3a: Tumor extends into renal vein branches/renal sinus. Regional Lymph Nodes (pN): pNX. Number of Lymph Nodes Examined: 0 Distant Metastasis (pM): pM N/A.\"    PAST MEDICAL HISTORY:  Past Medical History:   Diagnosis Date     Cancer of kidney, right (H)      Complication of anesthesia     slow wakeup      Malignant neoplasm of prostate metastatic to bone (H) 2/14/2019     Thyroid disease      PAST SURGICAL HISTORY:   Past Surgical History:   Procedure Laterality Date     CYSTOSCOPY      about 10 years ago     INSERT PORT VASCULAR ACCESS Right 5/2/2019    Procedure: Chest Port Placement;  Surgeon: Tate Burciaga PA-C;  Location: [facility] OR     IR CHEST PORT PLACEMENT > 5 YRS OF AGE  5/2/2019     LAPAROSCOPIC NEPHRECTOMY Right 3/19/2019    Procedure: Right laparoscopic radical nephrectomy;  Surgeon: Wesley Cleveland MD;  Location: [facility] OR      SOCIAL HISTORY:   Social History     Tobacco Use     Smoking status: Never Smoker     Smokeless tobacco: Never Used   Substance Use Topics     Alcohol use: None     Comment: wine - very rare     Drug use: No     FAMILY HISTORY:   Family History   Problem Relation Age of Onset     Diabetes Father      ALLERGIES:   Allergies   Allergen Reactions     Doxycycline GI Disturbance     CURRENT MEDICATIONS:   Current Outpatient Medications:      amLODIPine (NORVASC) 10 MG tablet, Take 1 tablet (10 mg) by mouth daily, Disp: 30 tablet, Rfl: 0     atorvastatin (LIPITOR) 20 MG tablet, Take 20 mg by mouth daily, Disp: , Rfl:      calcium citrate-vitamin D (CITRACAL) 315-250 MG-UNIT TABS per tablet, Take 650 mg by mouth 2 times daily , Disp: , Rfl:      cetirizine (ZYRTEC) 10 MG tablet, Take 10 mg by mouth as needed, Disp: , Rfl:      cinnamon 500 MG CAPS, Take 500 mg by mouth daily , Disp: , Rfl:      cycloSPORINE (RESTASIS) 0.05 " % ophthalmic emulsion, Place 1 drop into both eyes 2 times daily , Disp: , Rfl:      dexamethasone (DECADRON) 4 MG tablet, Take 8 mg by mouth 2 times daily Evening PRIOR and continue morning of Docetaxel, then 4 additional doses.., Disp: 12 tablet, Rfl: 5     fluticasone (FLONASE) 50 MCG/ACT nasal spray, Spray 2 sprays in nostril daily as needed , Disp: , Rfl:      Glucosamine-Chondroit-Vit C-Mn (GLUCOSAMINE 1500 COMPLEX) CAPS, Take 1 capsule by mouth daily, Disp: , Rfl:      levothyroxine (SYNTHROID/LEVOTHROID) 125 MCG tablet, Take 125 mcg by mouth daily, Disp: , Rfl:      Multiple Vitamins-Minerals (MULTIVITAMIN ADULT EXTRA C PO), Take 1 tablet by mouth every 24 hours, Disp: , Rfl:      Nutritional Supplements (SALMON OIL) CAPS, Take 1 capsule by mouth daily, Disp: , Rfl:      omeprazole (PRILOSEC) 20 MG DR capsule, Take 20 mg by mouth daily, Disp: , Rfl:      prochlorperazine (COMPAZINE) 10 MG tablet, Take 1 tablet (10 mg) by mouth every 6 hours as needed (Nausea/Vomiting), Disp: 30 tablet, Rfl: 5    Current Facility-Administered Medications:      sodium chloride (PF) 0.9% PF flush 20 mL, 20 mL, Intracatheter, Q1H PRN, Uday Smalls MD, 20 mL at 08/21/19 1429    PHYSICAL EXAMINATION:  VITALS: BP (!) 127/91 (BP Location: Right arm, Patient Position: Sitting, Cuff Size: Adult Large)   Pulse 83   Temp 98.7  F (37.1  C) (Oral)   Resp 16   Wt 108.4 kg (238 lb 14.4 oz)   SpO2 95%   BMI 32.40 kg/m      GENERAL: The patient is a pleasant male in no acute distress.  HEENT: EOMI. Sclerae are anicteric. Oral mucosa is pink and moist. Gingiva and buccal mucosa with mild edema but no lesions or thrush.   Lymph: Neck is supple with no lymphadenopathy in the cervical or supraclavicular areas.   Heart: Regular rate and rhythm.   Lungs: Clear to auscultation bilaterally. Normal respiratory effort.  Abdomen: Bowel sounds present, soft, nontender with no palpable hepatosplenomegaly or masses.   Extremities: No lower extremity  edema noted bilaterally  Neuro: Alert and fully oriented, no focal deficits.  Skin: No rashes, petechiae, or bruising noted on exposed skin.    LABORATORY & IMAGING STUDIES:  Lab Test 08/21/19  1436 07/31/19  1225 07/10/19  1332   WBC 10.6 8.8 9.9   RBC 4.27* 4.30* 3.99*   HGB 12.9* 13.1* 12.1*   HCT 38.6* 39.5* 36.7*   MCV 90 92 92   MCH 30.2 30.5 30.3   MCHC 33.4 33.2 33.0   RDW 13.6 13.9 15.0    200 259   NEUTROPHIL 89.1 89.7 87.5     Lab Test 08/21/19  1436 07/31/19  1225 07/10/19  1332    138 139   POTASSIUM 3.8 3.8 4.1   CHLORIDE 108 108 108   CO2 26 24 24   ANIONGAP 6 5 7   * 134* 124*   BUN 20 20 18   CR 1.03 1.07 1.11   BETHANY 9.2 9.2 9.3     Lab Test 08/21/19  1436 07/31/19  1225 07/10/19  1332   PROTTOTAL 7.2 7.5 7.4   ALBUMIN 3.8 3.9 3.9   BILITOTAL 0.4 0.5 0.4   ALKPHOS 52 61 66   AST 15 16 20   ALT 29 27 27      Lab Test 08/21/19  1436 07/31/19  1225 07/10/19  1332 06/18/19  1005 05/29/19  1217 04/30/19  1627 03/26/19  1604   PSA 0.71 0.95 0.82 0.80 1.18 0.69 1.25   CGAB  --  509* 646* 646* 659* 1,309* 900*   TESTOSTTOTAL  --  2* 3*  --  <2* 7* 22*   CGAB - Chromogranin A; TESTOSTTOTAL: Total testosterone.    IMAGING:  Previously reviewed CT CAP and NM Bone Scan from 7/2/19:  - Noted sclerotic lesions at C7 and T6 (less apparent), two very small liver lesions unchanged, sclerotic focus at L4 unchanged, sclerotic foci in bony pelvis and left femoral neck unchanged.  - Osseous lesions are stable to slightly less impressive compared to the prior study. No new findings.    ASSESSMENT & PLAN: Mr. Reyes is a delightful 59 year old gentleman with recently diagnosed metastatic castration sensitive prostate adenocarcinoma as well as an incidentally diagnosed stage III clear-cell renal cell carcinoma of the right kidney (s/p radical R nephrectomy 3/19/19), who is here for a follow-up visit while undergoing docetaxel + ADT for prostate cancer.      1. Metastatic prostate cancer, with  "involvement of the bones and RPLN:   - Patient meets the criteria for CHAARTED \"high-volume\" metastatic hormone-sensitive prostate cancer.  - There were no available clinical trials for this gentleman at the time of diagnosis, especially given concurrent malignancy.  He was enrolled in the institutional biobanking study on 2/15/19 but this is a non-interventional study.   - After a review of systemic therapy options, patient opted to start docetaxel in combination with ADT (per JOSEFA, [id number], KARLA).   - We previously reviewed restaging scan results from 7/2/19, that showed likely ongoing partial response with \"slightly less impressive osseous lesions\" compared to the prior study in May 2019.  - PSA continues a gradual down trend with some variability which is not overly concerning at this time.   - Plan to continue with cycle 6 docetaxel today. Regimen will be docetaxel 75mg/m2 IV every 3 weeks for up to 6 cycles. No primary prophy G-CSF.   - Will add 1 L IV fluid with this cycle given report of low BPs (80s/60s) in the days following chemo. Discussed staying hydrated with electrolyte balanced fluids rather than free water.  - Patient developed a grade 1 allergic reaction with cycle 1 (tongue swelling). Dose interrupted and given solumedrol and benadryl. RN successfully able to reinitiate docetaxel and patient tolerated remainder of infusion well. Added dex and benadryl premeds for all subsequent cycles.  - Risks/benefits of docetaxel including allergic reaction, fatigue, nausea/vomiting/constipation, myelosuppression, neuropathy, LFT changes etc previously discussed in detail.   - Encouraged use of salt-soda rinses to prevent and treat mucositis.  - Continue Lupron 22.5mg every 3 months - due today (last given 5/29/19).  - He understands the risks and benefits of ADT including hot flashes, mood changes and long-term cardiovascular, bone health, etc. Also understands the risks of docetaxel treatment " including fatigue, nausea, vomiting, diarrhea/constipation, hand-foot syndrome, allergic reactions, myelosuppression with increased risk of infection and bleeding etc.  - Will repeat restaging scans 4-6 weeks after completion of chemotherapy.   - Will also add antiresorptive therapy based on restaging results after completion of chemo.      2. Stage 3, UISS-high risk ccRCC:  - Patient understands that he has a stage III, UISS high risk clear-cell renal cell carcinoma with approximately 40-50% risk of recurrence after definitive surgery.   - At this time, he has no clear evidence of residual disease.  The retroperitoneal lymphadenopathy is more consistent with metastatic prostate cancer - this will need to be watched.  - He understands the role of adjuvant therapies in RCC and that at best, the data for adjuvant TKI therapy is mixed and shows an improvement in disease-free survival and not overall survival.   - Continue active surveillance with self-reporting for signs/symptoms of recurrence (this was previously explained in detail) and periodic H&P and scans.      3. Genetics referral:  - Was referred and seen by Genetics on 3/7/19. No evidence of inherited cancer syndromes found on work-up.      4. Research participation:  - Enrolled in  [employer] study.    5. Acute left retinal vein occlusion.  - Recent diagnosis of RVO per ophthalmologist. He was told that no surgery/intervention is needed and that this is a minor issue that will get better over time. He was given a choice of taking low-dose ASA to help with resolution, but opted not to start this and was told that it was already resolving.    6. Obstructive urinary symptoms.  - Longstanding obstructive symptoms, but now more bothersome nocturia x2-3. He inquired about starting Flomax.  - We discussed how Flomax works, and the possible side effect of orthostatic hypotension. Given his reported low BPs in the days following chemo, will defer starting Flomax for  now. Can revisit this topic over the next few weeks.    Return to clinic in a ~6-8 weeks with re-staging scans, sooner TAHMINA visit if needed.     Patient was seen and discussed with Dr. Uday Smalls.    Holly Warren MD/PhD  Heme/Onc Fellow       ATTENDING ATTESTATION NOTE:  I evaluated the patient and discussed and reviewed the case with the fellow, Dr. Holly Warren. I agree with the findings as documented in her note and have modified the contents above to accurately reflect my assessment and plan.      BILLIN.     Uday Smalls M.D.  . Professor of Medicine  Genitourinary Oncology  Division of Hematology, Oncology & Transplantation  [facility]

## 2019-08-21 NOTE — PATIENT INSTRUCTIONS
Contact Numbers    [facility] Main Line (for Scheduling/Triage/After Hours Nurse Line): [phone]    Please call the [facility] nurse triage or the after hours nurse line if you experience a temperature greater than or equal to 100.5, shaking chills, have uncontrolled nausea, vomiting and/or diarrhea, dizziness, lightheadedness, shortness of breath, chest pain, bleeding, unexplained bruising, or if you have any other new/concerning symptoms, questions or concerns.     If you are having any concerning symptoms or wish to speak to a provider before your next infusion visit, please call your care coordinator or triage to notify them so we can adequately serve you.     If you need a refill on a narcotic prescription or other medication, please call triage before your infusion appointment.       August 2019 Sunday Monday Tuesday Wednesday Thursday Friday Saturday                       1     2     3       4     5     6     7     8     9     10       11     12     13     14     15     16     17       18     19     20     21    [facility] LAB DRAW   2:30 PM   (15 min.)   [facility] LAB DRAW   [facility] Lab Draw    [facility] RETURN   2:45 PM   (30 min.)   Uday Smalls MD   [facility] ONC INFUSION 120   3:30 PM   (120 min.)    ONCOLOGY INFUSION   [facility] 22     23     24       25     26     27     28     29     30     31 September 2019 Sunday Monday Tuesday Wednesday Thursday Friday Saturday   1     2     3     4     5     6     7       8     9     10     11     12     13     14       15     16     17     18     19     20     21       22     23     24     25     26     27     28       29     30                                              Lab Results:  Recent Results (from the past 12 hour(s))   CBC with platelets differential    Collection Time: 08/21/19  2:36 PM   Result Value Ref Range    WBC 10.6 4.0 - 11.0 10e9/L    RBC Count 4.27 (L) 4.4 - 5.9  10e12/L    Hemoglobin 12.9 (L) 13.3 - 17.7 g/dL    Hematocrit 38.6 (L) 40.0 - 53.0 %    MCV 90 78 - 100 fl    MCH 30.2 26.5 - 33.0 pg    MCHC 33.4 31.5 - 36.5 g/dL    RDW 13.6 10.0 - 15.0 %    Platelet Count 191 150 - 450 10e9/L    Diff Method Automated Method     % Neutrophils 89.1 %    % Lymphocytes 9.2 %    % Monocytes 1.1 %    % Eosinophils 0.0 %    % Basophils 0.1 %    % Immature Granulocytes 0.5 %    Nucleated RBCs 0 0 /100    Absolute Neutrophil 9.5 (H) 1.6 - 8.3 10e9/L    Absolute Lymphocytes 1.0 0.8 - 5.3 10e9/L    Absolute Monocytes 0.1 0.0 - 1.3 10e9/L    Absolute Eosinophils 0.0 0.0 - 0.7 10e9/L    Absolute Basophils 0.0 0.0 - 0.2 10e9/L    Abs Immature Granulocytes 0.1 0 - 0.4 10e9/L    Absolute Nucleated RBC 0.0    PSA tumor marker    Collection Time: 08/21/19  2:36 PM   Result Value Ref Range    PSA 0.71 0 - 4 ug/L   Comprehensive metabolic panel    Collection Time: 08/21/19  2:36 PM   Result Value Ref Range    Sodium 141 133 - 144 mmol/L    Potassium 3.8 3.4 - 5.3 mmol/L    Chloride 108 94 - 109 mmol/L    Carbon Dioxide 26 20 - 32 mmol/L    Anion Gap 6 3 - 14 mmol/L    Glucose 130 (H) 70 - 99 mg/dL    Urea Nitrogen 20 7 - 30 mg/dL    Creatinine 1.03 0.66 - 1.25 mg/dL    GFR Estimate 80 >60 mL/min/[1.73_m2]    GFR Estimate If Black >90 >60 mL/min/[1.73_m2]    Calcium 9.2 8.5 - 10.1 mg/dL    Bilirubin Total 0.4 0.2 - 1.3 mg/dL    Albumin 3.8 3.4 - 5.0 g/dL    Protein Total 7.2 6.8 - 8.8 g/dL    Alkaline Phosphatase 52 40 - 150 U/L    ALT 29 0 - 70 U/L    AST 15 0 - 45 U/L   Lactate Dehydrogenase    Collection Time: 08/21/19  2:36 PM   Result Value Ref Range    Lactate Dehydrogenase 213 85 - 227 U/L

## 2019-08-21 NOTE — NURSING NOTE
"Chief Complaint   Patient presents with     Port Draw     labs drawn from port by rn.  vs taken     Port accessed with 20 gauge 3/4\" gripper needle and labs drawn by rn.  Port flushed with NS and heparin.  Pt tolerated well.  VS taken.  Pt checked in for next appt.    Nadege Mendoza RN    "

## 2019-08-21 NOTE — NURSING NOTE
Oncology Rooming Note    August 21, 2019 2:52 PM   Walter Reyes is a 59 year old male who presents for:    Chief Complaint   Patient presents with     Port Draw     labs drawn from port by rn.  vs taken     Oncology Clinic Visit     return; Prostate Ca     Initial Vitals: BP (!) 127/91 (BP Location: Right arm, Patient Position: Sitting, Cuff Size: Adult Large)   Pulse 83   Temp 98.7  F (37.1  C) (Oral)   Resp 16   Wt 108.4 kg (238 lb 14.4 oz)   SpO2 95%   BMI 32.40 kg/m   Estimated body mass index is 32.4 kg/m  as calculated from the following:    Height as of 7/11/19: 1.829 m (6').    Weight as of this encounter: 108.4 kg (238 lb 14.4 oz). Body surface area is 2.35 meters squared.  No Pain (0) Comment: Data Unavailable   No LMP for male patient.  Allergies reviewed: Yes  Medications reviewed: Yes    Medications: Patient needs Refill    Pharmacy name entered into EPIC: [facility] [place], [place] - [address]    Clinical concerns: Refill on compazine. Bone pain.          Nadine Murray, LISSETTE

## 2019-08-21 NOTE — NURSING NOTE
Patient presents to the [facility] for leuprolide (LUPRON DEPOT) kit 22.5 mg    . Order written by Uday Langford was completed today. Name and  were verified by patient. See MAR for medication details.Medication was given in the following site: Right Ventrogluteal.  Patient tolerated injection well.  Cate Rosas CMA 2019

## 2019-08-21 NOTE — PROGRESS NOTES
Infusion Nursing Note:  Walter Reyes presents today for Cycle 6 Day 1 Taxotere/Lupron.    Patient seen by provider today: Yes: Dr. Smalls   present during visit today: Not Applicable.    Note: Patient arrives with family to receive final cycle of Taxotere. Patient in good spirits and no complaints or further concerns following MD visit. Will receive 1L IVF today per Dr. Smalls's note d/t tendency to become hypotensive in days following chemotherapy. Patient agreeable to plan.    Intravenous Access:  Implanted Port.    Treatment Conditions:  Lab Results   Component Value Date    HGB 12.9 08/21/2019     Lab Results   Component Value Date    WBC 10.6 08/21/2019      Lab Results   Component Value Date    ANEU 9.5 08/21/2019     Lab Results   Component Value Date     08/21/2019      Lab Results   Component Value Date     08/21/2019                   Lab Results   Component Value Date    POTASSIUM 3.8 08/21/2019           No results found for: MAG         Lab Results   Component Value Date    CR 1.03 08/21/2019                   Lab Results   Component Value Date    BETHANY 9.2 08/21/2019                Lab Results   Component Value Date    BILITOTAL 0.4 08/21/2019           Lab Results   Component Value Date    ALBUMIN 3.8 08/21/2019                    Lab Results   Component Value Date    ALT 29 08/21/2019           Lab Results   Component Value Date    AST 15 08/21/2019       Results reviewed, labs MET treatment parameters, ok to proceed with treatment.      Post Infusion Assessment:  Patient tolerated infusion without incident.  Patient tolerated Lupron injection with incident. Given into right ventrogluteal muscle by CMA.  Blood return noted pre and post infusion.  Site patent and intact, free from redness, edema or discomfort.  No evidence of extravasations.  Access discontinued per protocol.       Discharge Plan:   Prescription refill requested for Compazine. Notified patient that  medication has historically been filled at local pharmacy. Pt agreeable to  there.  Discharge instructions reviewed with: Patient and Family.  Patient and/or family verbalized understanding of discharge instructions and all questions answered.  Copy of AVS reviewed with patient and/or family.  Patient will return 10/2 for CT with MD visit following. IB sent to Dr. Smalls to clarify if labs are wanted prior to CT - they are currently scheduled post scan prior to MD visit. Request sent to update scheduling if changes need to be made.  Patient discharged in stable condition accompanied by: family.  Departure Mode: Ambulatory.    Irena Bautista, RN, RN

## 2019-08-23 DIAGNOSIS — C79.51 MALIGNANT NEOPLASM OF PROSTATE METASTATIC TO BONE (H): ICD-10-CM

## 2019-08-23 DIAGNOSIS — C61 MALIGNANT NEOPLASM OF PROSTATE METASTATIC TO BONE (H): ICD-10-CM

## 2019-08-23 LAB — TESTOST SERPL-MCNC: <2 NG/DL (ref 240–950)

## 2019-08-23 RX ORDER — PROCHLORPERAZINE MALEATE 10 MG
10 TABLET ORAL EVERY 6 HOURS PRN
Qty: 30 TABLET | Refills: 5 | Status: SHIPPED | OUTPATIENT
Start: 2019-08-23 | End: 2020-07-10

## 2019-08-25 LAB — CGA SERPL-MCNC: 597 NG/ML (ref 0–160)

## 2019-09-23 PROBLEM — C64.1 RENAL CELL CARCINOMA, RIGHT (H): Status: ACTIVE | Noted: 2019-03-19

## 2019-10-02 ENCOUNTER — HOSPITAL ENCOUNTER (OUTPATIENT)
Dept: NUCLEAR MEDICINE | Facility: CLINIC | Age: 59
Setting detail: NUCLEAR MEDICINE
End: 2019-10-02
Attending: INTERNAL MEDICINE
Payer: COMMERCIAL

## 2019-10-02 ENCOUNTER — APPOINTMENT (OUTPATIENT)
Dept: LAB | Facility: CLINIC | Age: 59
End: 2019-10-02
Attending: INTERNAL MEDICINE
Payer: COMMERCIAL

## 2019-10-02 ENCOUNTER — ONCOLOGY VISIT (OUTPATIENT)
Dept: ONCOLOGY | Facility: CLINIC | Age: 59
End: 2019-10-02
Attending: INTERNAL MEDICINE
Payer: COMMERCIAL

## 2019-10-02 ENCOUNTER — HEALTH MAINTENANCE LETTER (OUTPATIENT)
Age: 59
End: 2019-10-02

## 2019-10-02 ENCOUNTER — HOSPITAL ENCOUNTER (OUTPATIENT)
Dept: CT IMAGING | Facility: CLINIC | Age: 59
Discharge: HOME | End: 2019-10-02
Attending: INTERNAL MEDICINE | Admitting: INTERNAL MEDICINE
Payer: COMMERCIAL

## 2019-10-02 VITALS
WEIGHT: 234.4 LBS | BODY MASS INDEX: 31.79 KG/M2 | OXYGEN SATURATION: 98 % | RESPIRATION RATE: 16 BRPM | DIASTOLIC BLOOD PRESSURE: 68 MMHG | SYSTOLIC BLOOD PRESSURE: 119 MMHG | TEMPERATURE: 98.5 F | HEART RATE: 63 BPM

## 2019-10-02 DIAGNOSIS — C61 MALIGNANT NEOPLASM OF PROSTATE METASTATIC TO BONE (H): ICD-10-CM

## 2019-10-02 DIAGNOSIS — C79.51 MALIGNANT NEOPLASM OF PROSTATE METASTATIC TO BONE (H): ICD-10-CM

## 2019-10-02 LAB
ALBUMIN SERPL-MCNC: 3.6 G/DL (ref 3.4–5)
ALP SERPL-CCNC: 55 U/L (ref 40–150)
ALT SERPL W P-5'-P-CCNC: 21 U/L (ref 0–70)
ANION GAP SERPL CALCULATED.3IONS-SCNC: 7 MMOL/L (ref 3–14)
AST SERPL W P-5'-P-CCNC: 16 U/L (ref 0–45)
BASOPHILS # BLD AUTO: 0 10E9/L (ref 0–0.2)
BASOPHILS NFR BLD AUTO: 0.8 %
BILIRUB SERPL-MCNC: 0.4 MG/DL (ref 0.2–1.3)
BUN SERPL-MCNC: 26 MG/DL (ref 7–30)
CALCIUM SERPL-MCNC: 8.9 MG/DL (ref 8.5–10.1)
CHLORIDE SERPL-SCNC: 108 MMOL/L (ref 94–109)
CO2 SERPL-SCNC: 27 MMOL/L (ref 20–32)
CREAT SERPL-MCNC: 1.08 MG/DL (ref 0.66–1.25)
DIFFERENTIAL METHOD BLD: ABNORMAL
EOSINOPHIL # BLD AUTO: 0.4 10E9/L (ref 0–0.7)
EOSINOPHIL NFR BLD AUTO: 7.8 %
ERYTHROCYTE [DISTWIDTH] IN BLOOD BY AUTOMATED COUNT: 13.5 % (ref 10–15)
GFR SERPL CREATININE-BSD FRML MDRD: 75 ML/MIN/{1.73_M2}
GLUCOSE SERPL-MCNC: 84 MG/DL (ref 70–99)
HCT VFR BLD AUTO: 40.3 % (ref 40–53)
HGB BLD-MCNC: 13.2 G/DL (ref 13.3–17.7)
IMM GRANULOCYTES # BLD: 0 10E9/L (ref 0–0.4)
IMM GRANULOCYTES NFR BLD: 0.4 %
LYMPHOCYTES # BLD AUTO: 1.7 10E9/L (ref 0.8–5.3)
LYMPHOCYTES NFR BLD AUTO: 32.2 %
MCH RBC QN AUTO: 29.6 PG (ref 26.5–33)
MCHC RBC AUTO-ENTMCNC: 32.8 G/DL (ref 31.5–36.5)
MCV RBC AUTO: 90 FL (ref 78–100)
MONOCYTES # BLD AUTO: 0.5 10E9/L (ref 0–1.3)
MONOCYTES NFR BLD AUTO: 9 %
NEUTROPHILS # BLD AUTO: 2.6 10E9/L (ref 1.6–8.3)
NEUTROPHILS NFR BLD AUTO: 49.8 %
NRBC # BLD AUTO: 0 10*3/UL
NRBC BLD AUTO-RTO: 0 /100
PLATELET # BLD AUTO: 186 10E9/L (ref 150–450)
POTASSIUM SERPL-SCNC: 4 MMOL/L (ref 3.4–5.3)
PROT SERPL-MCNC: 6.7 G/DL (ref 6.8–8.8)
PSA SERPL-MCNC: 0.58 UG/L (ref 0–4)
RBC # BLD AUTO: 4.46 10E12/L (ref 4.4–5.9)
SODIUM SERPL-SCNC: 142 MMOL/L (ref 133–144)
WBC # BLD AUTO: 5.1 10E9/L (ref 4–11)

## 2019-10-02 PROCEDURE — 86316 IMMUNOASSAY TUMOR OTHER: CPT | Performed by: INTERNAL MEDICINE

## 2019-10-02 PROCEDURE — 99215 OFFICE O/P EST HI 40 MIN: CPT | Mod: GC | Performed by: INTERNAL MEDICINE

## 2019-10-02 PROCEDURE — 34300033 ZZH RX 343: Performed by: INTERNAL MEDICINE

## 2019-10-02 PROCEDURE — 36591 DRAW BLOOD OFF VENOUS DEVICE: CPT

## 2019-10-02 PROCEDURE — 25000128 H RX IP 250 OP 636: Performed by: STUDENT IN AN ORGANIZED HEALTH CARE EDUCATION/TRAINING PROGRAM

## 2019-10-02 PROCEDURE — 74177 CT ABD & PELVIS W/CONTRAST: CPT

## 2019-10-02 PROCEDURE — 85025 COMPLETE CBC W/AUTO DIFF WBC: CPT | Performed by: INTERNAL MEDICINE

## 2019-10-02 PROCEDURE — A9503 TC99M MEDRONATE: HCPCS | Performed by: INTERNAL MEDICINE

## 2019-10-02 PROCEDURE — 25000128 H RX IP 250 OP 636: Performed by: RADIOLOGY

## 2019-10-02 PROCEDURE — 84153 ASSAY OF PSA TOTAL: CPT | Performed by: INTERNAL MEDICINE

## 2019-10-02 PROCEDURE — 78306 BONE IMAGING WHOLE BODY: CPT

## 2019-10-02 PROCEDURE — 25000128 H RX IP 250 OP 636: Mod: ZF | Performed by: INTERNAL MEDICINE

## 2019-10-02 PROCEDURE — G0463 HOSPITAL OUTPT CLINIC VISIT: HCPCS | Mod: ZF

## 2019-10-02 PROCEDURE — 71260 CT THORAX DX C+: CPT

## 2019-10-02 PROCEDURE — 84403 ASSAY OF TOTAL TESTOSTERONE: CPT | Performed by: INTERNAL MEDICINE

## 2019-10-02 PROCEDURE — 80053 COMPREHEN METABOLIC PANEL: CPT | Performed by: INTERNAL MEDICINE

## 2019-10-02 RX ORDER — HEPARIN SODIUM (PORCINE) LOCK FLUSH IV SOLN 100 UNIT/ML 100 UNIT/ML
5 SOLUTION INTRAVENOUS ONCE
Status: COMPLETED | OUTPATIENT
Start: 2019-10-02 | End: 2019-10-02

## 2019-10-02 RX ORDER — IOPAMIDOL 755 MG/ML
135 INJECTION, SOLUTION INTRAVASCULAR ONCE
Status: COMPLETED | OUTPATIENT
Start: 2019-10-02 | End: 2019-10-02

## 2019-10-02 RX ORDER — TC 99M MEDRONATE 20 MG/10ML
20-30 INJECTION, POWDER, LYOPHILIZED, FOR SOLUTION INTRAVENOUS ONCE
Status: COMPLETED | OUTPATIENT
Start: 2019-10-02 | End: 2019-10-02

## 2019-10-02 RX ADMIN — Medication 5 ML: at 11:08

## 2019-10-02 RX ADMIN — IOPAMIDOL 135 ML: 755 INJECTION, SOLUTION INTRAVENOUS at 11:00

## 2019-10-02 RX ADMIN — HEPARIN 5 ML: 100 SYRINGE at 09:59

## 2019-10-02 RX ADMIN — TC 99M MEDRONATE 24.5 MCI.: 20 INJECTION, POWDER, LYOPHILIZED, FOR SOLUTION INTRAVENOUS at 10:38

## 2019-10-02 ASSESSMENT — PAIN SCALES - GENERAL: PAINLEVEL: NO PAIN (0)

## 2019-10-02 NOTE — NURSING NOTE
Chief Complaint   Patient presents with     Port Draw     Labs drawn via port by RN in lab. VS taken. Pt checked in for next appt     Port accessed with 20g flat needle by RN, labs collected, line flushed with saline and heparin.  Vitals taken. Pt checked in for appointment(s).    Gloria SEAY RN PHN BSN  BMT/Oncology Lab

## 2019-10-02 NOTE — NURSING NOTE
Oncology Rooming Note    October 2, 2019 2:31 PM   Walter Reyes is a 59 year old male who presents for:    Chief Complaint   Patient presents with     Port Draw     Labs drawn via port by RN in lab. VS taken. Pt checked in for next appt     Oncology Clinic Visit     Return; Prostate Ca     Initial Vitals: /68 (BP Location: Right arm, Patient Position: Sitting, Cuff Size: Adult Large)   Pulse 63   Temp 98.5  F (36.9  C) (Oral)   Resp 16   Wt 106.3 kg (234 lb 6.4 oz)   SpO2 98%   BMI 31.79 kg/m   Estimated body mass index is 31.79 kg/m  as calculated from the following:    Height as of 7/11/19: 1.829 m (6').    Weight as of this encounter: 106.3 kg (234 lb 6.4 oz). Body surface area is 2.32 meters squared.  No Pain (0) Comment: Data Unavailable   No LMP for male patient.  Allergies reviewed: Yes  Medications reviewed: Yes    Medications: Medication refills not needed today.  Pharmacy name entered into EPIC: [facility] [place], [place] - [address] [address]    Clinical concerns: PET scan, CT scan, and lab results        Radha Salas CMA

## 2019-10-02 NOTE — LETTER
"10/2/2019       RE: Walter Reyes  [id number] [facility] [address]     Dear Colleague,    Thank you for referring your patient, Walter Reyes, to the [facility]. Please see a copy of my visit note below.    MEDICAL ONCOLOGY FOLLOW-UP NOTE    REFERRING PROVIDER: Jorge Alberto Yepez MD at [facility].    REASON FOR VISIT: Follow-up for metastatic hormone-sensitive prostate cancer, after completion of docetaxel.    HISTORY OF PRESENT ILLNESS: Mr. 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 His oncologic history is detailed below.     Walter reports that he is starting to feel better now that he is done with chemotherapy. Energy level is getting better. No further nausea. Appetite is good. Working on developing a healthier diet with less processed foods. Starting to work out again. Does have bothersome hot flashes and drenching sweats at times. Libido is non-existent. Chronic mild neuropathy in the bilateral thumbs and toes, not overly bothersome. He notes ongoing mild pain in the areas where he knows he has bony disease - posterior chest wall and left hip primarily. No new areas of pain. No hematuria, SOB/cough, or rashes.     ONCOLOGIC HISTORY:  1. Prostate adenocarcinoma, stage IV (M1b at diagnosis), high-volume, castration-sensitive:  - 12/13/2018: PSA found to be elevated to 9.1 ng/mL on a routine follow-up with primary care provider Dr. Naylor at [facility]. Prior PSA were 1.4 on 8/16/17, 2.4 on 6/28/16, 2.9 on 6/28/16, and as low as 0.4 on 3/26/2003.   - 1/08/2019: Consultation with Sarah Wilson CNP in Urology clinic. Repeat PSA 9.6.  - 1/16/2019: MRI prostate with contrast - \"This examination is characterized as PIRADS 5- very high probability. Clinically significant " "cancer is highly likely to be present. There is a large, invasive mass arising from the right peripheral zone and extending into the neurovascular bundle, seminal vesicle, and along the anterolateral right mesorectal fascia. Metastatic right external iliac lymph node. Metastatic lesion in the posterior/superior right acetabulum.\"  - 1/25/2019: CT abdomen and pelvis with IV contrast - \"1. Heterogeneous enhancing mass posteriorly in the upper pole of the right kidney measures 4.5 x 5.8 x 5.7 cm (AP by transverse by craniocaudal). It has a small nodular component extending posterior medially which abuts the right psoas muscle. This nodular extension measures 2.1 x 2.1 cm. Minimal stranding about the mass. No definite thrombus within the right renal vein. This renal mass is compatible with a renal cell carcinoma. A paraaortic lymph node situated immediately posterior to the left renal vein measures 1.1 cm in short axis, suspicious for metastasis. 2. A 2 cm right external iliac lymph node is also suspicious for metastases. 3. Multiple sclerotic osseous lesions suspicious for metastases. These include 0.8 and 0.6 cm sclerotic lesions laterally in the right iliac wing (images 53 and 62 respectively). Ill-defined groundglass density laterally in the right acetabulum measuring 1.7 x 1.2 cm corresponds with the lesion identified on MRI. A 0.4 cm groundglass density in the left acetabulum. Sclerotic lesion in the left femoral neck measures 0.9 x 1.6 cm (image 81). Sclerotic metastases would be more compatible with prostate metastases. 4. A 3 subcentimeter hepatic lesions are indeterminate. Metastases would be a consideration. These can be further characterized with liver MRI.\"  - 1/25/2019: NM bone scan - \"There is focal bony uptake in the left femoral neck, right acetabulum, right of midline at the S1 or L5 level of the spine, within multiple bilateral ribs, in the C7 or T1 level of the spine, and anteriorly within the skull. " "Findings are suspicious for metastases.\"  - 1/31/19: CT chest with contrast - \" No suspicious nodules in the chest.  Stable appearance of a 5.8 cm right renal mass concerning for renal carcinoma until proven otherwise.  Stable indeterminant subcentimeter hypodensities in the liver.  Multifocal osteoblastic metastasis including 2.5 cm lesion in the right fifth rib posteriorly and a 1.6 cm lesion in the right third rib posteriorly. No lytic lesions identified.\"  - 2/6/19: CT-guided right sclerotic fifth rib lesion biopsy - \"Metastatic carcinoma, consistent with prostate primary.  Immunohistochemical stains performed show the metastatic carcinoma stains positive for NKX3.1 (prostate marker), negative for STACIE 3 and PAX8, which supports the above diagnosis.\"  - 2/15/19: Started Casodex 50mg every day and consented for the biobanking protocol. 3/18/19 - stopped Casodex.  - 2/19/19: Case discussed in tumor board - recommendation for nephectomy for suspected malignant right renal mass.   - 2/26/19: Started Lupron 22.5mg every 3 months.   - 5/8/19: Started Docetaxel 75mg/m2 IV every 3 weeks. 06/18/19 - cycle 3, 7/10/19 - cycle 4, 07/31/19 - cycle 5, 08/21/19 - cycle 6.   - 10/2/19: Restaging CT CAP and NM Bone Scan showed improved osseous disease, no evidence of recurrent or further metastatic disease.     2. Stage III ([id number]), grade 3 of 4, clear-cell RCC of right kidney:  - Incidentally diagnosed as above.   - Underwent curative-intent robotic right radical nephrectomy with Dr. Wesley Cleveland on 3/19/19. No tumor spillage per op report.   - Path showed: \"Histologic Type: Clear cell renal cell carcinoma; Sarcomatoid Features: Not identified; Histologic Grade: Nucleolar grade 3 (WHO/ISUP). Extent Tumor Size: 4.3 cm. Microscopic Tumor Extension: Tumor extension into renal sinus (in vascular structures). Margins: Negative. Tumor Necrosis: Present; focal. Lymph-Vascular Invasion: Not identified.  Pathologic Staging (pTNM) " "Primary Tumor (pT): pT3a: Tumor extends into renal vein branches/renal sinus. Regional Lymph Nodes (pN): pNX. Number of Lymph Nodes Examined: 0 Distant Metastasis (pM): pM N/A.\"    PAST MEDICAL HISTORY:  Past Medical History:   Diagnosis Date     Cancer of kidney, right (H)      Complication of anesthesia     slow wakeup      Malignant neoplasm of prostate metastatic to bone (H) 2/14/2019     Thyroid disease      PAST SURGICAL HISTORY:   Past Surgical History:   Procedure Laterality Date     CYSTOSCOPY      about 10 years ago     INSERT PORT VASCULAR ACCESS Right 5/2/2019    Procedure: Chest Port Placement;  Surgeon: Tate Burciaga PA-C;  Location: [facility] OR     IR CHEST PORT PLACEMENT > 5 YRS OF AGE  5/2/2019     LAPAROSCOPIC NEPHRECTOMY Right 3/19/2019    Procedure: Right laparoscopic radical nephrectomy;  Surgeon: Wesley Cleveland MD;  Location: [facility] OR      SOCIAL HISTORY:   Social History     Tobacco Use     Smoking status: Never Smoker     Smokeless tobacco: Never Used   Substance Use Topics     Alcohol use: Not on file     Comment: wine - very rare     Drug use: No     FAMILY HISTORY:   Family History   Problem Relation Age of Onset     Diabetes Father      ALLERGIES:   Allergies   Allergen Reactions     Doxycycline GI Disturbance     CURRENT MEDICATIONS:   Current Outpatient Medications:      amLODIPine (NORVASC) 10 MG tablet, Take 1 tablet (10 mg) by mouth daily, Disp: 30 tablet, Rfl: 0     atorvastatin (LIPITOR) 20 MG tablet, Take 20 mg by mouth daily, Disp: , Rfl:      calcium citrate-vitamin D (CITRACAL) 315-250 MG-UNIT TABS per tablet, Take 650 mg by mouth 2 times daily , Disp: , Rfl:      cetirizine (ZYRTEC) 10 MG tablet, Take 10 mg by mouth as needed, Disp: , Rfl:      cinnamon 500 MG CAPS, Take 500 mg by mouth daily , Disp: , Rfl:      cycloSPORINE (RESTASIS) 0.05 % ophthalmic emulsion, Place 1 drop into both eyes 2 times daily , Disp: , Rfl:      fluticasone (FLONASE) 50 MCG/ACT nasal " spray, Spray 2 sprays in nostril daily as needed , Disp: , Rfl:      Glucosamine-Chondroit-Vit C-Mn (GLUCOSAMINE 1500 COMPLEX) CAPS, Take 1 capsule by mouth daily, Disp: , Rfl:      levothyroxine (SYNTHROID/LEVOTHROID) 125 MCG tablet, Take 125 mcg by mouth daily, Disp: , Rfl:      Multiple Vitamins-Minerals (MULTIVITAMIN ADULT EXTRA C PO), Take 1 tablet by mouth every 24 hours, Disp: , Rfl:      Nutritional Supplements (SALMON OIL) CAPS, Take 1 capsule by mouth daily, Disp: , Rfl:      omeprazole (PRILOSEC) 20 MG DR capsule, Take 20 mg by mouth daily, Disp: , Rfl:      prochlorperazine (COMPAZINE) 10 MG tablet, Take 1 tablet (10 mg) by mouth every 6 hours as needed (Nausea/Vomiting), Disp: 30 tablet, Rfl: 5  No current facility-administered medications for this visit.     PHYSICAL EXAMINATION:  VITALS: /68 (BP Location: Right arm, Patient Position: Sitting, Cuff Size: Adult Large)   Pulse 63   Temp 98.5  F (36.9  C) (Oral)   Resp 16   Wt 106.3 kg (234 lb 6.4 oz)   SpO2 98%   BMI 31.79 kg/m      GENERAL: The patient is a pleasant male in no acute distress.  HEENT: EOMI. Sclerae are anicteric. Oral mucosa is pink and moist.   Lymph: Neck is supple with no lymphadenopathy in the cervical or supraclavicular areas.   Heart: Regular rate and rhythm.   Lungs: Clear to auscultation bilaterally. Normal respiratory effort.  Abdomen: Bowel sounds present, soft, nontender with no palpable hepatosplenomegaly or masses.   Extremities: No lower extremity edema noted bilaterally.  Neuro: Alert and fully oriented, no focal deficits.  Skin: No rashes, petechiae, or bruising noted on exposed skin.    LABORATORY & IMAGING STUDIES:  Lab Test 10/02/19  0955 08/21/19  1436 07/31/19  1225   WBC 5.1 10.6 8.8   RBC 4.46 4.27* 4.30*   HGB 13.2* 12.9* 13.1*   HCT 40.3 38.6* 39.5*   MCV 90 90 92   MCH 29.6 30.2 30.5   MCHC 32.8 33.4 33.2   RDW 13.5 13.6 13.9    191 200   NEUTROPHIL 49.8 89.1 89.7     Lab Test 10/02/19  0955  "08/21/19  1436 07/31/19  1225    141 138   POTASSIUM 4.0 3.8 3.8   CHLORIDE 108 108 108   CO2 27 26 24   ANIONGAP 7 6 5   GLC 84 130* 134*   BUN 26 20 20   CR 1.08 1.03 1.07   BETHANY 8.9 9.2 9.2     Lab Test 10/02/19  0955 08/21/19  1436 07/31/19  1225   PROTTOTAL 6.7* 7.2 7.5   ALBUMIN 3.6 3.8 3.9   BILITOTAL 0.4 0.4 0.5   ALKPHOS 55 52 61   AST 16 15 16   ALT 21 29 27      Lab Test 10/02/19  0955 08/21/19  1436 07/31/19  1225 07/10/19  1332 06/18/19  1005 05/29/19  1217 04/30/19  1627   PSA 0.58 0.71 0.95 0.82 0.80 1.18 0.69   CGAB  --  597* 509* 646* 646* 659* 1,309*   TESTOSTTOTAL  --  <2* 2* 3*  --  <2* 7*   CGAB - Chromogranin A; TESTOSTTOTAL: Total testosterone.    IMAGING:  Reviewed CT CAP and NM Bone Scan from 10/2/19:  - Noted sclerotic lesions at C7 and T6 are not apparent on today's CT, other sclerotic foci in bony pelvis and left femoral neck are unchanged.  - Osseous lesions are stable to slightly less impressive compared to the prior study. No new findings.    ASSESSMENT & PLAN: Mr. Reyes is a delightful 59 year old gentleman with recently diagnosed metastatic castration sensitive prostate adenocarcinoma as well as an incidentally diagnosed stage III clear-cell renal cell carcinoma of the right kidney (s/p radical R nephrectomy 3/19/19), who is here for a follow-up visit after completion of docetaxel for prostate cancer.     1. Metastatic prostate cancer, with involvement of the bones and RPLN:   - Patient met the criteria for CHAARTED \"high-volume\" metastatic hormone-sensitive prostate cancer. There were no available clinical trials for this gentleman at the time of diagnosis, especially given concurrent malignancy. He was enrolled in the institutional biobanking study on 2/15/19 but this is a non-interventional study.   - After a review of systemic therapy options, patient opted to start docetaxel in combination with ADT (per JOSEFA, SHAVONNE-AFU15, KARLA). He tolerated 6 cycles of " docetaxel fairly well (5/8/19 through 8/21/19), with the exception of mild fatigue and mild neuropathy.  - Restaging CT CAP and NM Bone Scan from earlier today show continued improvement in the osseous disease compared to the prior study in May 2019. No evidence of recurrence or further metastatic disease.  - PSA continues a gradual down trend with some variability which is not overly concerning at this time.   - Continue Lupron 22.5mg every 3 months - last given 8/21/19.  - He understands the risks and benefits of ADT including hot flashes, mood changes and long-term cardiovascular, bone health, etc. Also understands the risks of docetaxel treatment including fatigue, nausea, vomiting, diarrhea/constipation, hand-foot syndrome, allergic reactions, myelosuppression with increased risk of infection and bleeding etc.    2. Bone metastases:  - Noted to have osseous metastatic disease at the time of diagnosis, which has improved now following 6 cycles of docetaxel.  - Encouraged to continue calcium and vitamin D supplementation.  - Will need DEXA after 1 year of Lupron (May 2020).     3. Stage 3, UISS-high risk ccRCC:  - Patient understands that he has a stage III, UISS high risk clear-cell renal cell carcinoma with approximately 40-50% risk of recurrence after definitive surgery.   - At this time, he has no clear evidence of residual disease. The retroperitoneal lymphadenopathy is more consistent with metastatic prostate cancer - this will need to be watched.  - He understands the role of adjuvant therapies in RCC and that at best, the data for adjuvant TKI therapy is mixed and shows an improvement in disease-free survival and not overall survival.   - Continue active surveillance with self-reporting for signs/symptoms of recurrence (this was previously explained in detail) and periodic H&P and scans.      4. Genetics referral:  - Was referred and seen by Genetics on 3/7/19. No evidence of inherited cancer syndromes  found on work-up.      5. Research participation:  - Enrolled in  biobanking study.    Return to clinic in 2 months for Lupron injection and in 3 months for a clinic visit (goal to get these lined up eventually)    Patient was seen and discussed with Dr. Uday Smalls.    Holly Warren MD/PhD  Heme/Onc Fellow      ATTENDING ATTESTATION NOTE:  I saw the patient with Dr. Warren and discussed all pertinent clinical data including lab, imaging and path reports. The plan above was made under my supervision and accurately reflects the A/P after my examination and discussion with the patient.     BILLIN.     Uday Smalls M.D.  [employer]. Professor of Medicine  Division of Hematology, Oncology & Transplantation  [facility]

## 2019-10-02 NOTE — PROGRESS NOTES
"MEDICAL ONCOLOGY FOLLOW-UP NOTE    REFERRING PROVIDER: Jorge Alberto Yepez MD at [facility].    REASON FOR VISIT: Follow-up for metastatic hormone-sensitive prostate cancer, after completion of docetaxel.    HISTORY OF PRESENT ILLNESS: Mr. 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 His oncologic history is detailed below.     Walter reports that he is starting to feel better now that he is done with chemotherapy. Energy level is getting better. No further nausea. Appetite is good. Working on developing a healthier diet with less processed foods. Starting to work out again. Does have bothersome hot flashes and drenching sweats at times. Libido is non-existent. Chronic mild neuropathy in the bilateral thumbs and toes, not overly bothersome. He notes ongoing mild pain in the areas where he knows he has bony disease - posterior chest wall and left hip primarily. No new areas of pain. No hematuria, SOB/cough, or rashes.     ONCOLOGIC HISTORY:  1. Prostate adenocarcinoma, stage IV (M1b at diagnosis), high-volume, castration-sensitive:  - 12/13/2018: PSA found to be elevated to 9.1 ng/mL on a routine follow-up with primary care provider Dr. Naylor at [facility]. Prior PSA were 1.4 on 8/16/17, 2.4 on 6/28/16, 2.9 on 6/28/16, and as low as 0.4 on 3/26/2003.   - 1/08/2019: Consultation with Sarah Wilson CNP in Urology clinic. Repeat PSA 9.6.  - 1/16/2019: MRI prostate with contrast - \"This examination is characterized as PIRADS 5- very high probability. Clinically significant cancer is highly likely to be present. There is a large, invasive mass arising from the right peripheral zone and extending into the neurovascular bundle, seminal vesicle, and along the anterolateral right mesorectal fascia. Metastatic right external iliac " "lymph node. Metastatic lesion in the posterior/superior right acetabulum.\"  - 1/25/2019: CT abdomen and pelvis with IV contrast - \"1. Heterogeneous enhancing mass posteriorly in the upper pole of the right kidney measures 4.5 x 5.8 x 5.7 cm (AP by transverse by craniocaudal). It has a small nodular component extending posterior medially which abuts the right psoas muscle. This nodular extension measures 2.1 x 2.1 cm. Minimal stranding about the mass. No definite thrombus within the right renal vein. This renal mass is compatible with a renal cell carcinoma. A paraaortic lymph node situated immediately posterior to the left renal vein measures 1.1 cm in short axis, suspicious for metastasis. 2. A 2 cm right external iliac lymph node is also suspicious for metastases. 3. Multiple sclerotic osseous lesions suspicious for metastases. These include 0.8 and 0.6 cm sclerotic lesions laterally in the right iliac wing (images 53 and 62 respectively). Ill-defined groundglass density laterally in the right acetabulum measuring 1.7 x 1.2 cm corresponds with the lesion identified on MRI. A 0.4 cm groundglass density in the left acetabulum. Sclerotic lesion in the left femoral neck measures 0.9 x 1.6 cm (image 81). Sclerotic metastases would be more compatible with prostate metastases. 4. A 3 subcentimeter hepatic lesions are indeterminate. Metastases would be a consideration. These can be further characterized with liver MRI.\"  - 1/25/2019: NM bone scan - \"There is focal bony uptake in the left femoral neck, right acetabulum, right of midline at the S1 or L5 level of the spine, within multiple bilateral ribs, in the C7 or T1 level of the spine, and anteriorly within the skull. Findings are suspicious for metastases.\"  - 1/31/19: CT chest with contrast - \" No suspicious nodules in the chest.  Stable appearance of a 5.8 cm right renal mass concerning for renal carcinoma until proven otherwise.  Stable indeterminant subcentimeter " "hypodensities in the liver.  Multifocal osteoblastic metastasis including 2.5 cm lesion in the right fifth rib posteriorly and a 1.6 cm lesion in the right third rib posteriorly. No lytic lesions identified.\"  - 2/6/19: CT-guided right sclerotic fifth rib lesion biopsy - \"Metastatic carcinoma, consistent with prostate primary.  Immunohistochemical stains performed show the metastatic carcinoma stains positive for NKX3.1 (prostate marker), negative for STACIE 3 and PAX8, which supports the above diagnosis.\"  - 2/15/19: Started Casodex 50mg every day and consented for the biobanking protocol. 3/18/19 - stopped Casodex.  - 2/19/19: Case discussed in tumor board - recommendation for nephectomy for suspected malignant right renal mass.   - 2/26/19: Started Lupron 22.5mg every 3 months.   - 5/8/19: Started Docetaxel 75mg/m2 IV every 3 weeks. 06/18/19 - cycle 3, 7/10/19 - cycle 4, 07/31/19 - cycle 5, 08/21/19 - cycle 6.   - 10/2/19: Restaging CT CAP and NM Bone Scan showed improved osseous disease, no evidence of recurrent or further metastatic disease.     2. Stage III ([id number]), grade 3 of 4, clear-cell RCC of right kidney:  - Incidentally diagnosed as above.   - Underwent curative-intent robotic right radical nephrectomy with Dr. Wesley Cleveland on 3/19/19. No tumor spillage per op report.   - Path showed: \"Histologic Type: Clear cell renal cell carcinoma; Sarcomatoid Features: Not identified; Histologic Grade: Nucleolar grade 3 (WHO/ISUP). Extent Tumor Size: 4.3 cm. Microscopic Tumor Extension: Tumor extension into renal sinus (in vascular structures). Margins: Negative. Tumor Necrosis: Present; focal. Lymph-Vascular Invasion: Not identified.  Pathologic Staging (pTNM) Primary Tumor (pT): pT3a: Tumor extends into renal vein branches/renal sinus. Regional Lymph Nodes (pN): pNX. Number of Lymph Nodes Examined: 0 Distant Metastasis (pM): pM N/A.\"    PAST MEDICAL HISTORY:  Past Medical History:   Diagnosis Date     Cancer of " kidney, right (H)      Complication of anesthesia     slow wakeup      Malignant neoplasm of prostate metastatic to bone (H) 2/14/2019     Thyroid disease      PAST SURGICAL HISTORY:   Past Surgical History:   Procedure Laterality Date     CYSTOSCOPY      about 10 years ago     INSERT PORT VASCULAR ACCESS Right 5/2/2019    Procedure: Chest Port Placement;  Surgeon: Tate Burciaga PA-C;  Location: [facility] OR     IR CHEST PORT PLACEMENT > 5 YRS OF AGE  5/2/2019     LAPAROSCOPIC NEPHRECTOMY Right 3/19/2019    Procedure: Right laparoscopic radical nephrectomy;  Surgeon: Wesley Cleveland MD;  Location: [facility] OR      SOCIAL HISTORY:   Social History     Tobacco Use     Smoking status: Never Smoker     Smokeless tobacco: Never Used   Substance Use Topics     Alcohol use: Not on file     Comment: wine - very rare     Drug use: No     FAMILY HISTORY:   Family History   Problem Relation Age of Onset     Diabetes Father      ALLERGIES:   Allergies   Allergen Reactions     Doxycycline GI Disturbance     CURRENT MEDICATIONS:   Current Outpatient Medications:      amLODIPine (NORVASC) 10 MG tablet, Take 1 tablet (10 mg) by mouth daily, Disp: 30 tablet, Rfl: 0     atorvastatin (LIPITOR) 20 MG tablet, Take 20 mg by mouth daily, Disp: , Rfl:      calcium citrate-vitamin D (CITRACAL) 315-250 MG-UNIT TABS per tablet, Take 650 mg by mouth 2 times daily , Disp: , Rfl:      cetirizine (ZYRTEC) 10 MG tablet, Take 10 mg by mouth as needed, Disp: , Rfl:      cinnamon 500 MG CAPS, Take 500 mg by mouth daily , Disp: , Rfl:      cycloSPORINE (RESTASIS) 0.05 % ophthalmic emulsion, Place 1 drop into both eyes 2 times daily , Disp: , Rfl:      fluticasone (FLONASE) 50 MCG/ACT nasal spray, Spray 2 sprays in nostril daily as needed , Disp: , Rfl:      Glucosamine-Chondroit-Vit C-Mn (GLUCOSAMINE 1500 COMPLEX) CAPS, Take 1 capsule by mouth daily, Disp: , Rfl:      levothyroxine (SYNTHROID/LEVOTHROID) 125 MCG tablet, Take 125 mcg by mouth  daily, Disp: , Rfl:      Multiple Vitamins-Minerals (MULTIVITAMIN ADULT EXTRA C PO), Take 1 tablet by mouth every 24 hours, Disp: , Rfl:      Nutritional Supplements (SALMON OIL) CAPS, Take 1 capsule by mouth daily, Disp: , Rfl:      omeprazole (PRILOSEC) 20 MG DR capsule, Take 20 mg by mouth daily, Disp: , Rfl:      prochlorperazine (COMPAZINE) 10 MG tablet, Take 1 tablet (10 mg) by mouth every 6 hours as needed (Nausea/Vomiting), Disp: 30 tablet, Rfl: 5  No current facility-administered medications for this visit.     PHYSICAL EXAMINATION:  VITALS: /68 (BP Location: Right arm, Patient Position: Sitting, Cuff Size: Adult Large)   Pulse 63   Temp 98.5  F (36.9  C) (Oral)   Resp 16   Wt 106.3 kg (234 lb 6.4 oz)   SpO2 98%   BMI 31.79 kg/m     GENERAL: The patient is a pleasant male in no acute distress.  HEENT: EOMI. Sclerae are anicteric. Oral mucosa is pink and moist.   Lymph: Neck is supple with no lymphadenopathy in the cervical or supraclavicular areas.   Heart: Regular rate and rhythm.   Lungs: Clear to auscultation bilaterally. Normal respiratory effort.  Abdomen: Bowel sounds present, soft, nontender with no palpable hepatosplenomegaly or masses.   Extremities: No lower extremity edema noted bilaterally.  Neuro: Alert and fully oriented, no focal deficits.  Skin: No rashes, petechiae, or bruising noted on exposed skin.    LABORATORY & IMAGING STUDIES:  Lab Test 10/02/19  0955 08/21/19  1436 07/31/19  1225   WBC 5.1 10.6 8.8   RBC 4.46 4.27* 4.30*   HGB 13.2* 12.9* 13.1*   HCT 40.3 38.6* 39.5*   MCV 90 90 92   MCH 29.6 30.2 30.5   MCHC 32.8 33.4 33.2   RDW 13.5 13.6 13.9    191 200   NEUTROPHIL 49.8 89.1 89.7     Lab Test 10/02/19  0955 08/21/19  1436 07/31/19  1225    141 138   POTASSIUM 4.0 3.8 3.8   CHLORIDE 108 108 108   CO2 27 26 24   ANIONGAP 7 6 5   GLC 84 130* 134*   BUN 26 20 20   CR 1.08 1.03 1.07   BETHANY 8.9 9.2 9.2     Lab Test 10/02/19  0955 08/21/19  1436 07/31/19  1225  "  PROTTOTAL 6.7* 7.2 7.5   ALBUMIN 3.6 3.8 3.9   BILITOTAL 0.4 0.4 0.5   ALKPHOS 55 52 61   AST 16 15 16   ALT 21 29 27      Lab Test 10/02/19  0955 08/21/19  1436 07/31/19  1225 07/10/19  1332 06/18/19  1005 05/29/19  1217 04/30/19  1627   PSA 0.58 0.71 0.95 0.82 0.80 1.18 0.69   CGAB  --  597* 509* 646* 646* 659* 1,309*   TESTOSTTOTAL  --  <2* 2* 3*  --  <2* 7*   CGAB - Chromogranin A; TESTOSTTOTAL: Total testosterone.    IMAGING:  Reviewed CT CAP and NM Bone Scan from 10/2/19:  - Noted sclerotic lesions at C7 and T6 are not apparent on today's CT, other sclerotic foci in bony pelvis and left femoral neck are unchanged.  - Osseous lesions are stable to slightly less impressive compared to the prior study. No new findings.    ASSESSMENT & PLAN: Mr. Reyes is a delightful 59 year old gentleman with recently diagnosed metastatic castration sensitive prostate adenocarcinoma as well as an incidentally diagnosed stage III clear-cell renal cell carcinoma of the right kidney (s/p radical R nephrectomy 3/19/19), who is here for a follow-up visit after completion of docetaxel for prostate cancer.     1. Metastatic prostate cancer, with involvement of the bones and RPLN:   - Patient met the criteria for ARTUROED \"high-volume\" metastatic hormone-sensitive prostate cancer. There were no available clinical trials for this gentleman at the time of diagnosis, especially given concurrent malignancy. He was enrolled in the institutional biobanking study on 2/15/19 but this is a non-interventional study.   - After a review of systemic therapy options, patient opted to start docetaxel in combination with ADT (per OJSEFA, [id number], FELIPEEDE). He tolerated 6 cycles of docetaxel fairly well (5/8/19 through 8/21/19), with the exception of mild fatigue and mild neuropathy.  - Restaging CT CAP and NM Bone Scan from earlier today show continued improvement in the osseous disease compared to the prior study in May 2019. No " evidence of recurrence or further metastatic disease.  - PSA continues a gradual down trend with some variability which is not overly concerning at this time.   - Continue Lupron 22.5mg every 3 months - last given 8/21/19.  - He understands the risks and benefits of ADT including hot flashes, mood changes and long-term cardiovascular, bone health, etc. Also understands the risks of docetaxel treatment including fatigue, nausea, vomiting, diarrhea/constipation, hand-foot syndrome, allergic reactions, myelosuppression with increased risk of infection and bleeding etc.    2. Bone metastases:  - Noted to have osseous metastatic disease at the time of diagnosis, which has improved now following 6 cycles of docetaxel.  - Encouraged to continue calcium and vitamin D supplementation.  - Will need DEXA after 1 year of Lupron (May 2020).     3. Stage 3, UISS-high risk ccRCC:  - Patient understands that he has a stage III, UISS high risk clear-cell renal cell carcinoma with approximately 40-50% risk of recurrence after definitive surgery.   - At this time, he has no clear evidence of residual disease. The retroperitoneal lymphadenopathy is more consistent with metastatic prostate cancer - this will need to be watched.  - He understands the role of adjuvant therapies in RCC and that at best, the data for adjuvant TKI therapy is mixed and shows an improvement in disease-free survival and not overall survival.   - Continue active surveillance with self-reporting for signs/symptoms of recurrence (this was previously explained in detail) and periodic H&P and scans.      4. Genetics referral:  - Was referred and seen by Genetics on 3/7/19. No evidence of inherited cancer syndromes found on work-up.      5. Research participation:  - Enrolled in  biobanking study.    Return to clinic in 2 months for Lupron injection and in 3 months for a clinic visit (goal to get these lined up eventually)    Patient was seen and discussed with   Uday Smalls.    Holly Warren MD/PhD  Heme/Onc Fellow      ATTENDING ATTESTATION NOTE:  I saw the patient with Dr. Warren and discussed all pertinent clinical data including lab, imaging and path reports. The plan above was made under my supervision and accurately reflects the A/P after my examination and discussion with the patient.     BILLIN.     Uday Smalls M.D.  [employer]. Professor of Medicine  Division of Hematology, Oncology & Transplantation  [facility]

## 2019-10-05 LAB
CGA SERPL-MCNC: 736 NG/ML (ref 0–160)
TESTOST SERPL-MCNC: 16 NG/DL (ref 240–950)

## 2019-12-06 ENCOUNTER — TELEPHONE (OUTPATIENT)
Dept: ONCOLOGY | Facility: CLINIC | Age: 59
End: 2019-12-06

## 2019-12-06 ENCOUNTER — ALLIED HEALTH/NURSE VISIT (OUTPATIENT)
Dept: INFUSION THERAPY | Facility: CLINIC | Age: 59
End: 2019-12-06
Attending: INTERNAL MEDICINE
Payer: COMMERCIAL

## 2019-12-06 VITALS
SYSTOLIC BLOOD PRESSURE: 118 MMHG | OXYGEN SATURATION: 98 % | HEART RATE: 65 BPM | DIASTOLIC BLOOD PRESSURE: 74 MMHG | TEMPERATURE: 98.7 F | RESPIRATION RATE: 16 BRPM

## 2019-12-06 DIAGNOSIS — C79.51 MALIGNANT NEOPLASM OF PROSTATE METASTATIC TO BONE (H): Primary | ICD-10-CM

## 2019-12-06 DIAGNOSIS — C61 MALIGNANT NEOPLASM OF PROSTATE METASTATIC TO BONE (H): Primary | ICD-10-CM

## 2019-12-06 PROCEDURE — 96402 CHEMO HORMON ANTINEOPL SQ/IM: CPT

## 2019-12-06 PROCEDURE — 25000128 H RX IP 250 OP 636: Performed by: INTERNAL MEDICINE

## 2019-12-06 RX ADMIN — LEUPROLIDE ACETATE 22.5 MG: KIT at 15:28

## 2019-12-06 NOTE — PROGRESS NOTES
Infusion Nursing Note:  Walter Reyes presents today for Lupron.    Patient seen by provider today: No   present during visit today: Not Applicable.    Note: N/A.    Intravenous Access:  No Intravenous access/labs at this visit.    Treatment Conditions:  Not Applicable.      Post Infusion Assessment:  Patient tolerated injection without incident.       Discharge Plan:   Discharge instructions reviewed with: Patient.  Patient and/or family verbalized understanding of discharge instructions and all questions answered.  Copy of AVS reviewed with patient and/or family.  Patient will return 1/07/20 for next appointment with Dr. Smalls.  Patient discharged in stable condition accompanied by: self.  Departure Mode: Ambulatory.    Marti Blount RN

## 2020-01-02 ENCOUNTER — TELEPHONE (OUTPATIENT)
Dept: ONCOLOGY | Facility: CLINIC | Age: 60
End: 2020-01-02

## 2020-01-02 NOTE — TELEPHONE ENCOUNTER
Form Request Documentation    Date Received in Clinic:  12/26/19  Name/Type of Form: [employer] Insurance Claim Form  Date Completed: 1/2/2020  Copy Mailed to patient: Yes on 1/2/2020  Disposition of Form: Faxed to [employer] fax [phone] on 1/2/2020

## 2020-01-06 ENCOUNTER — HOSPITAL ENCOUNTER (OUTPATIENT)
Dept: NUCLEAR MEDICINE | Facility: CLINIC | Age: 60
Setting detail: NUCLEAR MEDICINE
Discharge: HOME | End: 2020-01-06
Attending: INTERNAL MEDICINE | Admitting: INTERNAL MEDICINE
Payer: COMMERCIAL

## 2020-01-06 ENCOUNTER — HOSPITAL ENCOUNTER (OUTPATIENT)
Dept: NUCLEAR MEDICINE | Facility: CLINIC | Age: 60
Setting detail: NUCLEAR MEDICINE
End: 2020-01-06
Attending: INTERNAL MEDICINE
Payer: COMMERCIAL

## 2020-01-06 ENCOUNTER — HOSPITAL ENCOUNTER (OUTPATIENT)
Dept: CT IMAGING | Facility: CLINIC | Age: 60
Discharge: HOME | End: 2020-01-06
Attending: INTERNAL MEDICINE | Admitting: INTERNAL MEDICINE
Payer: COMMERCIAL

## 2020-01-06 DIAGNOSIS — C79.51 MALIGNANT NEOPLASM OF PROSTATE METASTATIC TO BONE (H): ICD-10-CM

## 2020-01-06 DIAGNOSIS — C61 MALIGNANT NEOPLASM OF PROSTATE METASTATIC TO BONE (H): ICD-10-CM

## 2020-01-06 LAB
ALBUMIN SERPL-MCNC: 4 G/DL (ref 3.4–5)
ALP SERPL-CCNC: 65 U/L (ref 40–150)
ALT SERPL W P-5'-P-CCNC: 30 U/L (ref 0–70)
ANION GAP SERPL CALCULATED.3IONS-SCNC: 5 MMOL/L (ref 3–14)
AST SERPL W P-5'-P-CCNC: 11 U/L (ref 0–45)
BASOPHILS # BLD AUTO: 0 10E9/L (ref 0–0.2)
BASOPHILS NFR BLD AUTO: 0.5 %
BILIRUB SERPL-MCNC: 0.4 MG/DL (ref 0.2–1.3)
BUN SERPL-MCNC: 21 MG/DL (ref 7–30)
CALCIUM SERPL-MCNC: 9.4 MG/DL (ref 8.5–10.1)
CHLORIDE SERPL-SCNC: 108 MMOL/L (ref 94–109)
CO2 SERPL-SCNC: 28 MMOL/L (ref 20–32)
CREAT BLD-MCNC: 1.2 MG/DL (ref 0.66–1.25)
CREAT SERPL-MCNC: 1.13 MG/DL (ref 0.66–1.25)
DIFFERENTIAL METHOD BLD: NORMAL
EOSINOPHIL # BLD AUTO: 0.1 10E9/L (ref 0–0.7)
EOSINOPHIL NFR BLD AUTO: 3.4 %
ERYTHROCYTE [DISTWIDTH] IN BLOOD BY AUTOMATED COUNT: 14.1 % (ref 10–15)
GFR SERPL CREATININE-BSD FRML MDRD: 62 ML/MIN/{1.73_M2}
GFR SERPL CREATININE-BSD FRML MDRD: 71 ML/MIN/{1.73_M2}
GLUCOSE SERPL-MCNC: 103 MG/DL (ref 70–99)
HCT VFR BLD AUTO: 42.8 % (ref 40–53)
HGB BLD-MCNC: 13.9 G/DL (ref 13.3–17.7)
IMM GRANULOCYTES # BLD: 0 10E9/L (ref 0–0.4)
IMM GRANULOCYTES NFR BLD: 0.5 %
LYMPHOCYTES # BLD AUTO: 1.5 10E9/L (ref 0.8–5.3)
LYMPHOCYTES NFR BLD AUTO: 35.3 %
MCH RBC QN AUTO: 28.3 PG (ref 26.5–33)
MCHC RBC AUTO-ENTMCNC: 32.5 G/DL (ref 31.5–36.5)
MCV RBC AUTO: 87 FL (ref 78–100)
MONOCYTES # BLD AUTO: 0.3 10E9/L (ref 0–1.3)
MONOCYTES NFR BLD AUTO: 7.9 %
NEUTROPHILS # BLD AUTO: 2.2 10E9/L (ref 1.6–8.3)
NEUTROPHILS NFR BLD AUTO: 52.4 %
NRBC # BLD AUTO: 0 10*3/UL
NRBC BLD AUTO-RTO: 0 /100
PLATELET # BLD AUTO: 203 10E9/L (ref 150–450)
POTASSIUM SERPL-SCNC: 4.1 MMOL/L (ref 3.4–5.3)
PROT SERPL-MCNC: 7.2 G/DL (ref 6.8–8.8)
PSA SERPL-MCNC: 0.44 UG/L (ref 0–4)
RBC # BLD AUTO: 4.91 10E12/L (ref 4.4–5.9)
SODIUM SERPL-SCNC: 141 MMOL/L (ref 133–144)
WBC # BLD AUTO: 4.2 10E9/L (ref 4–11)

## 2020-01-06 PROCEDURE — 86316 IMMUNOASSAY TUMOR OTHER: CPT | Performed by: INTERNAL MEDICINE

## 2020-01-06 PROCEDURE — 84153 ASSAY OF PSA TOTAL: CPT | Performed by: INTERNAL MEDICINE

## 2020-01-06 PROCEDURE — 36415 COLL VENOUS BLD VENIPUNCTURE: CPT | Performed by: INTERNAL MEDICINE

## 2020-01-06 PROCEDURE — 25000128 H RX IP 250 OP 636: Performed by: INTERNAL MEDICINE

## 2020-01-06 PROCEDURE — A9503 TC99M MEDRONATE: HCPCS | Performed by: INTERNAL MEDICINE

## 2020-01-06 PROCEDURE — 80053 COMPREHEN METABOLIC PANEL: CPT | Performed by: INTERNAL MEDICINE

## 2020-01-06 PROCEDURE — 82565 ASSAY OF CREATININE: CPT

## 2020-01-06 PROCEDURE — 84403 ASSAY OF TOTAL TESTOSTERONE: CPT | Performed by: INTERNAL MEDICINE

## 2020-01-06 PROCEDURE — 85025 COMPLETE CBC W/AUTO DIFF WBC: CPT | Performed by: INTERNAL MEDICINE

## 2020-01-06 PROCEDURE — 34300033 ZZH RX 343: Performed by: INTERNAL MEDICINE

## 2020-01-06 PROCEDURE — 74177 CT ABD & PELVIS W/CONTRAST: CPT

## 2020-01-06 PROCEDURE — 78306 BONE IMAGING WHOLE BODY: CPT

## 2020-01-06 RX ORDER — TC 99M MEDRONATE 20 MG/10ML
20-30 INJECTION, POWDER, LYOPHILIZED, FOR SOLUTION INTRAVENOUS ONCE
Status: COMPLETED | OUTPATIENT
Start: 2020-01-06 | End: 2020-01-06

## 2020-01-06 RX ORDER — HEPARIN SODIUM (PORCINE) LOCK FLUSH IV SOLN 100 UNIT/ML 100 UNIT/ML
5 SOLUTION INTRAVENOUS ONCE
Status: COMPLETED | OUTPATIENT
Start: 2020-01-06 | End: 2020-01-06

## 2020-01-06 RX ORDER — IOPAMIDOL 755 MG/ML
135 INJECTION, SOLUTION INTRAVASCULAR ONCE
Status: COMPLETED | OUTPATIENT
Start: 2020-01-06 | End: 2020-01-06

## 2020-01-06 RX ADMIN — TC 99M MEDRONATE 25.8 MCI.: 20 INJECTION, POWDER, LYOPHILIZED, FOR SOLUTION INTRAVENOUS at 09:11

## 2020-01-06 RX ADMIN — IOPAMIDOL 135 ML: 755 INJECTION, SOLUTION INTRAVENOUS at 09:46

## 2020-01-06 RX ADMIN — HEPARIN 5 ML: 100 SYRINGE at 09:52

## 2020-01-07 ENCOUNTER — ONCOLOGY VISIT (OUTPATIENT)
Dept: ONCOLOGY | Facility: CLINIC | Age: 60
End: 2020-01-07
Attending: INTERNAL MEDICINE
Payer: COMMERCIAL

## 2020-01-07 VITALS
BODY MASS INDEX: 33.24 KG/M2 | DIASTOLIC BLOOD PRESSURE: 77 MMHG | HEART RATE: 81 BPM | TEMPERATURE: 98.6 F | SYSTOLIC BLOOD PRESSURE: 120 MMHG | OXYGEN SATURATION: 97 % | WEIGHT: 245.1 LBS

## 2020-01-07 DIAGNOSIS — C61 MALIGNANT NEOPLASM OF PROSTATE METASTATIC TO BONE (H): Primary | ICD-10-CM

## 2020-01-07 DIAGNOSIS — C79.51 MALIGNANT NEOPLASM OF PROSTATE METASTATIC TO BONE (H): Primary | ICD-10-CM

## 2020-01-07 DIAGNOSIS — C64.1 RENAL CELL CARCINOMA, RIGHT (H): ICD-10-CM

## 2020-01-07 LAB
CGA SERPL-MCNC: 470 NG/ML (ref 0–160)
TESTOST SERPL-MCNC: 16 NG/DL (ref 240–950)

## 2020-01-07 PROCEDURE — G0463 HOSPITAL OUTPT CLINIC VISIT: HCPCS | Mod: ZF

## 2020-01-07 PROCEDURE — 99214 OFFICE O/P EST MOD 30 MIN: CPT | Mod: ZP | Performed by: INTERNAL MEDICINE

## 2020-01-07 ASSESSMENT — PAIN SCALES - GENERAL: PAINLEVEL: NO PAIN (0)

## 2020-01-07 NOTE — NURSING NOTE
Oncology Rooming Note    January 7, 2020 1:07 PM   Walter Reyes is a 59 year old male who presents for:    Chief Complaint   Patient presents with     Oncology Clinic Visit     Return visit; prostate cancer; vitals taken     Initial Vitals: /77   Pulse 81   Temp 98.6  F (37  C) (Oral)   Wt 111.2 kg (245 lb 1.6 oz)   SpO2 97%   BMI 33.24 kg/m   Estimated body mass index is 33.24 kg/m  as calculated from the following:    Height as of 7/11/19: 1.829 m (6').    Weight as of this encounter: 111.2 kg (245 lb 1.6 oz). Body surface area is 2.38 meters squared.  No Pain (0) Comment: Data Unavailable   No LMP for male patient.  Allergies reviewed: Yes  Medications reviewed: Yes    Medications: Medication refills not needed today.  Pharmacy name entered into EPIC: [facility] [place], [place] - [address] [address]    Clinical concerns: No new concerns today. Dr. Smalls was notified.      AREN FOUNTAIN, CMA

## 2020-01-07 NOTE — PROGRESS NOTES
"MEDICAL ONCOLOGY FOLLOW-UP NOTE    REFERRING PROVIDER: Jorge Alberto Yepez MD at [facility].    REASON FOR VISIT: Follow-up for metastatic hormone-sensitive prostate cancer, currently on ADT alone.    HISTORY OF PRESENT ILLNESS: Mr. 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 His oncologic history is detailed below.     Walter reports that he is starting to feel better now that he is done with chemotherapy. Energy level is getting better and he's recovered fully from hair loss etc. Does have bothersome hot flashes and drenching sweats at times. Libido is non-existent. Gained some weight over the holidays and he has noted 3 episodes of nocturnal emissions. Had recurrence of grade 1-2 PSN in hands/feet after a 21-mile hike down the [place] in Dec 2019 - now resolving.     No signs/symptoms of disease progression.     ONCOLOGIC HISTORY:  1. Prostate adenocarcinoma, stage IV (M1b at diagnosis), high-volume, castration-sensitive:  - 12/13/2018: PSA found to be elevated to 9.1 ng/mL on a routine follow-up with primary care provider Dr. Naylor at [facility]. Prior PSA were 1.4 on 8/16/17, 2.4 on 6/28/16, 2.9 on 6/28/16, and as low as 0.4 on 3/26/2003.   - 1/08/2019: Consultation with Sarah Wilson CNP in Urology clinic. Repeat PSA 9.6.  - 1/16/2019: MRI prostate with contrast - \"This examination is characterized as PIRADS 5- very high probability. Clinically significant cancer is highly likely to be present. There is a large, invasive mass arising from the right peripheral zone and extending into the neurovascular bundle, seminal vesicle, and along the anterolateral right mesorectal fascia. Metastatic right external iliac lymph node. Metastatic lesion in the posterior/superior right acetabulum.\"  - 1/25/2019: CT abdomen and pelvis with IV " "contrast - \"1. Heterogeneous enhancing mass posteriorly in the upper pole of the right kidney measures 4.5 x 5.8 x 5.7 cm (AP by transverse by craniocaudal). It has a small nodular component extending posterior medially which abuts the right psoas muscle. This nodular extension measures 2.1 x 2.1 cm. Minimal stranding about the mass. No definite thrombus within the right renal vein. This renal mass is compatible with a renal cell carcinoma. A paraaortic lymph node situated immediately posterior to the left renal vein measures 1.1 cm in short axis, suspicious for metastasis. 2. A 2 cm right external iliac lymph node is also suspicious for metastases. 3. Multiple sclerotic osseous lesions suspicious for metastases. These include 0.8 and 0.6 cm sclerotic lesions laterally in the right iliac wing (images 53 and 62 respectively). Ill-defined groundglass density laterally in the right acetabulum measuring 1.7 x 1.2 cm corresponds with the lesion identified on MRI. A 0.4 cm groundglass density in the left acetabulum. Sclerotic lesion in the left femoral neck measures 0.9 x 1.6 cm (image 81). Sclerotic metastases would be more compatible with prostate metastases. 4. A 3 subcentimeter hepatic lesions are indeterminate. Metastases would be a consideration. These can be further characterized with liver MRI.\"  - 1/25/2019: NM bone scan - \"There is focal bony uptake in the left femoral neck, right acetabulum, right of midline at the S1 or L5 level of the spine, within multiple bilateral ribs, in the C7 or T1 level of the spine, and anteriorly within the skull. Findings are suspicious for metastases.\"  - 1/31/19: CT chest with contrast - \" No suspicious nodules in the chest.  Stable appearance of a 5.8 cm right renal mass concerning for renal carcinoma until proven otherwise.  Stable indeterminant subcentimeter hypodensities in the liver.  Multifocal osteoblastic metastasis including 2.5 cm lesion in the right fifth rib " "posteriorly and a 1.6 cm lesion in the right third rib posteriorly. No lytic lesions identified.\"  - 2/6/19: CT-guided right sclerotic fifth rib lesion biopsy - \"Metastatic carcinoma, consistent with prostate primary.  Immunohistochemical stains performed show the metastatic carcinoma stains positive for NKX3.1 (prostate marker), negative for STACIE 3 and PAX8, which supports the above diagnosis.\"  - 2/15/19: Started Casodex 50mg every day and consented for the biobanking protocol. 3/18/19 - stopped Casodex.  - 2/19/19: Case discussed in tumor board - recommendation for nephectomy for suspected malignant right renal mass.   - 2/26/19: Started Lupron 22.5mg every 3 months.   - 5/8/19: Started Docetaxel 75mg/m2 IV every 3 weeks. 06/18/19 - cycle 3, 7/10/19 - cycle 4, 07/31/19 - cycle 5, 08/21/19 - cycle 6.   - 10/2/19: Restaging CT CAP and NM Bone Scan showed improved osseous disease, no evidence of recurrent or new metastatic disease.  - 1/5/20: Restaging CT CAP and NM Bone Scan showed stable osseous disease, no evidence of recurrent or new metastatic disease.     2. Stage III ([id number]), grade 3 of 4, clear-cell RCC of right kidney:  - Incidentally diagnosed as above.   - Underwent curative-intent robotic right radical nephrectomy with Dr. Wesley Cleveland on 3/19/19. No tumor spillage per op report.   - Path showed: \"Histologic Type: Clear cell renal cell carcinoma; Sarcomatoid Features: Not identified; Histologic Grade: Nucleolar grade 3 (WHO/ISUP). Extent Tumor Size: 4.3 cm. Microscopic Tumor Extension: Tumor extension into renal sinus (in vascular structures). Margins: Negative. Tumor Necrosis: Present; focal. Lymph-Vascular Invasion: Not identified.  Pathologic Staging (pTNM) Primary Tumor (pT): pT3a: Tumor extends into renal vein branches/renal sinus. Regional Lymph Nodes (pN): pNX. Number of Lymph Nodes Examined: 0 Distant Metastasis (pM): pM N/A.\"  - Restaging scans as above.    PAST MEDICAL HISTORY:  Past " Medical History:   Diagnosis Date     Cancer of kidney, right (H)      Complication of anesthesia     slow wakeup      Malignant neoplasm of prostate metastatic to bone (H) 2/14/2019     Thyroid disease      PAST SURGICAL HISTORY:   Past Surgical History:   Procedure Laterality Date     CYSTOSCOPY      about 10 years ago     INSERT PORT VASCULAR ACCESS Right 5/2/2019    Procedure: Chest Port Placement;  Surgeon: Tate Burciaga PA-C;  Location: [facility] OR     IR CHEST PORT PLACEMENT > 5 YRS OF AGE  5/2/2019     LAPAROSCOPIC NEPHRECTOMY Right 3/19/2019    Procedure: Right laparoscopic radical nephrectomy;  Surgeon: Wesley Cleveland MD;  Location: [facility] OR      SOCIAL HISTORY:   Social History     Tobacco Use     Smoking status: Never Smoker     Smokeless tobacco: Never Used   Substance Use Topics     Alcohol use: None     Comment: wine - very rare     Drug use: No     FAMILY HISTORY:   Family History   Problem Relation Age of Onset     Diabetes Father      ALLERGIES:   Allergies   Allergen Reactions     Doxycycline GI Disturbance     CURRENT MEDICATIONS:   Current Outpatient Medications:      amLODIPine (NORVASC) 10 MG tablet, Take 1 tablet (10 mg) by mouth daily, Disp: 30 tablet, Rfl: 0     atorvastatin (LIPITOR) 20 MG tablet, Take 20 mg by mouth daily, Disp: , Rfl:      calcium citrate-vitamin D (CITRACAL) 315-250 MG-UNIT TABS per tablet, Take 650 mg by mouth 2 times daily , Disp: , Rfl:      cetirizine (ZYRTEC) 10 MG tablet, Take 10 mg by mouth as needed, Disp: , Rfl:      cinnamon 500 MG CAPS, Take 500 mg by mouth daily , Disp: , Rfl:      cycloSPORINE (RESTASIS) 0.05 % ophthalmic emulsion, Place 1 drop into both eyes 2 times daily , Disp: , Rfl:      fluticasone (FLONASE) 50 MCG/ACT nasal spray, Spray 2 sprays in nostril daily as needed , Disp: , Rfl:      Glucosamine-Chondroit-Vit C-Mn (GLUCOSAMINE 1500 COMPLEX) CAPS, Take 1 capsule by mouth daily, Disp: , Rfl:      levothyroxine  (SYNTHROID/LEVOTHROID) 125 MCG tablet, Take 125 mcg by mouth daily, Disp: , Rfl:      Multiple Vitamins-Minerals (MULTIVITAMIN ADULT EXTRA C PO), Take 1 tablet by mouth every 24 hours, Disp: , Rfl:      Nutritional Supplements (SALMON OIL) CAPS, Take 1 capsule by mouth daily, Disp: , Rfl:      omeprazole (PRILOSEC) 20 MG DR capsule, Take 20 mg by mouth daily, Disp: , Rfl:      prochlorperazine (COMPAZINE) 10 MG tablet, Take 1 tablet (10 mg) by mouth every 6 hours as needed (Nausea/Vomiting), Disp: 30 tablet, Rfl: 5    PHYSICAL EXAMINATION:  VITALS: /77   Pulse 81   Temp 98.6  F (37  C) (Oral)   Wt 111.2 kg (245 lb 1.6 oz)   SpO2 97%   BMI 33.24 kg/m     GENERAL: The patient is a pleasant male in no acute distress.  HEENT: EOMI. Sclerae are anicteric. Oral mucosa is pink and moist.   Lymph: Neck is supple with no lymphadenopathy in the cervical or supraclavicular areas.   Heart: Regular rate and rhythm.   Lungs: Clear to auscultation bilaterally. Normal respiratory effort.  Abdomen: Bowel sounds present, soft, nontender with no palpable hepatosplenomegaly or masses.   Extremities: No lower extremity edema noted bilaterally.  Neuro: Alert and fully oriented, no focal deficits.  Skin: No rashes, petechiae, or bruising noted on exposed skin.    LABORATORY DATA:   Lab Test 01/06/20  0858 10/02/19  0955 08/21/19  1436   WBC 4.2 5.1 10.6   RBC 4.91 4.46 4.27*   HGB 13.9 13.2* 12.9*   HCT 42.8 40.3 38.6*   MCV 87 90 90   MCH 28.3 29.6 30.2   MCHC 32.5 32.8 33.4   RDW 14.1 13.5 13.6    186 191   NEUTROPHIL 52.4 49.8 89.1    142 141   POTASSIUM 4.1 4.0 3.8   CHLORIDE 108 108 108   CO2 28 27 26   ANIONGAP 5 7 6   * 84 130*   BUN 21 26 20   CR 1.13 1.08 1.03   BETHANY 9.4 8.9 9.2   PROTTOTAL 7.2 6.7* 7.2   ALBUMIN 4.0 3.6 3.8   BILITOTAL 0.4 0.4 0.4   ALKPHOS 65 55 52   AST 11 16 15   ALT 30 21 29     Lab Test 01/06/20  0858 10/02/19  0955 08/21/19  1436 07/31/19  1225 07/10/19  1332 06/18/19  1005  "  PSA 0.44 0.58 0.71 0.95 0.82 0.80   CGAB  --  736* 597* 509* 646* 646*   TESTOSTTOTAL 16* 16* <2* 2* 3*  --    CGAB - Chromogranin A; TESTOSTTOTAL: Total testosterone.    Lab Test 08/21/19  1436 07/31/19  1225 07/10/19  1332 06/18/19  1005 05/29/19  1217    219 218 220 232*     IMAGING:  Reviewed CT CAP and NM Bone Scan from 10/2/19:  - Noted sclerotic lesions at C7 and T6 are not apparent on today's CT, other sclerotic foci in bony pelvis and left femoral neck are unchanged.  - Osseous lesions are stable to slightly less impressive compared to the prior study. No new findings.    ASSESSMENT & PLAN: Mr. Reyes is a delightful 59 year old gentleman with recently diagnosed metastatic castration sensitive prostate adenocarcinoma as well as an incidentally diagnosed stage III clear-cell renal cell carcinoma of the right kidney (s/p radical R nephrectomy 3/19/19), who is here for a follow-up visit after completion of docetaxel for prostate cancer.     1. Metastatic prostate cancer, with involvement of the bones and RPLN:   - Patient met the criteria for CHAARTED \"high-volume\" metastatic hormone-sensitive prostate cancer.   - After a review of systemic therapy options, patient opted to start docetaxel in combination with ADT (per JOSEFA, [id number], FELIPEEDE). He tolerated 6 cycles of docetaxel fairly well (5/8/19 through 8/21/19), with the exception of mild fatigue and mild neuropathy.  - Restaging CT CAP and NM Bone Scan from y'day shows essentially stable disease. No evidence of recurrence or further metastatic disease.  - PSA continues a gradual down trend with some variability. No clinical evidence of progression.  - Continue Lupron 22.5mg every 3 months - last given 12/6/19.  - He understands the risks and benefits of ADT including hot flashes, mood changes and long-term cardiovascular, bone health, etc. Also understands the risks of docetaxel treatment including fatigue, nausea, vomiting, " diarrhea/constipation, hand-foot syndrome, allergic reactions, myelosuppression with increased risk of infection and bleeding etc.    2. Bone metastases:   - Noted to have osseous metastatic disease at the time of diagnosis, which has improved now following 6 cycles of docetaxel.  - Encouraged to continue calcium and vitamin D supplementation.  - Discussed role of bone anti-resorptive therapies again - given young age and high-volume SP, will plan on adding Zometa on follow-up visit. Rx will be every 3 months for 2 years total. Pt to get dental workup completed before then.     3. Stage 3, UISS-high risk ccRCC:  - Patient understands that he has a stage III, UISS high risk clear-cell renal cell carcinoma with approximately 40-50% risk of recurrence after definitive surgery.   - At this time, he has no clear evidence of residual disease. The retroperitoneal lymphadenopathy is more consistent with metastatic prostate cancer and is improving/resolved.   - Inbasket sent to Dr. Cleveland to seek input and help manage the bladder dome CT finding.  - Continue active surveillance with self-reporting for signs/symptoms of recurrence (this was previously explained in detail) and periodic H&P and scans.      4. Genetics referral:  - Was referred and seen by Genetics on 3/7/19. No evidence of inherited cancer syndromes found on work-up.      5. Research participation:  - There were no available clinical trials for this gentleman at the time of diagnosis, especially given concurrent malignancy.   - Enrolled in the institutional biobanking study on 2/15/19.     Return to clinic in 2.5 months with labs, scans and next doses of Lupron plus Zometa.    BILLIN.     Uday Smalls M.D.  . Professor of Medicine  Division of Hematology, Oncology & Transplantation  [facility]

## 2020-01-07 NOTE — LETTER
"1/7/2020       RE: Walter Reyes  [address] [facility] [id number]     Dear Colleague,    Thank you for referring your patient, Walter Reyes, to the [facility]. Please see a copy of my visit note below.    MEDICAL ONCOLOGY FOLLOW-UP NOTE    REFERRING PROVIDER: Jorge Alberto Yepez MD at [facility].    REASON FOR VISIT: Follow-up for metastatic hormone-sensitive prostate cancer, currently on ADT alone.    HISTORY OF PRESENT ILLNESS: Mr. 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 His oncologic history is detailed below.     Walter reports that he is starting to feel better now that he is done with chemotherapy. Energy level is getting better and he's recovered fully from hair loss etc. Does have bothersome hot flashes and drenching sweats at times. Libido is non-existent. Gained some weight over the holidays and he has noted 3 episodes of nocturnal emissions. Had recurrence of grade 1-2 PSN in hands/feet after a 21-mile hike down the [facility] in Dec 2019 - now resolving.     No signs/symptoms of disease progression.     ONCOLOGIC HISTORY:  1. Prostate adenocarcinoma, stage IV (M1b at diagnosis), high-volume, castration-sensitive:  - 12/13/2018: PSA found to be elevated to 9.1 ng/mL on a routine follow-up with primary care provider Dr. Naylor at [facility]. Prior PSA were 1.4 on 8/16/17, 2.4 on 6/28/16, 2.9 on 6/28/16, and as low as 0.4 on 3/26/2003.   - 1/08/2019: Consultation with Sarah Wilson CNP in Urology clinic. Repeat PSA 9.6.  - 1/16/2019: MRI prostate with contrast - \"This examination is characterized as PIRADS 5- very high probability. Clinically significant cancer is highly likely to be present. There is a large, invasive mass arising from the right peripheral zone and " "extending into the neurovascular bundle, seminal vesicle, and along the anterolateral right mesorectal fascia. Metastatic right external iliac lymph node. Metastatic lesion in the posterior/superior right acetabulum.\"  - 1/25/2019: CT abdomen and pelvis with IV contrast - \"1. Heterogeneous enhancing mass posteriorly in the upper pole of the right kidney measures 4.5 x 5.8 x 5.7 cm (AP by transverse by craniocaudal). It has a small nodular component extending posterior medially which abuts the right psoas muscle. This nodular extension measures 2.1 x 2.1 cm. Minimal stranding about the mass. No definite thrombus within the right renal vein. This renal mass is compatible with a renal cell carcinoma. A paraaortic lymph node situated immediately posterior to the left renal vein measures 1.1 cm in short axis, suspicious for metastasis. 2. A 2 cm right external iliac lymph node is also suspicious for metastases. 3. Multiple sclerotic osseous lesions suspicious for metastases. These include 0.8 and 0.6 cm sclerotic lesions laterally in the right iliac wing (images 53 and 62 respectively). Ill-defined groundglass density laterally in the right acetabulum measuring 1.7 x 1.2 cm corresponds with the lesion identified on MRI. A 0.4 cm groundglass density in the left acetabulum. Sclerotic lesion in the left femoral neck measures 0.9 x 1.6 cm (image 81). Sclerotic metastases would be more compatible with prostate metastases. 4. A 3 subcentimeter hepatic lesions are indeterminate. Metastases would be a consideration. These can be further characterized with liver MRI.\"  - 1/25/2019: NM bone scan - \"There is focal bony uptake in the left femoral neck, right acetabulum, right of midline at the S1 or L5 level of the spine, within multiple bilateral ribs, in the C7 or T1 level of the spine, and anteriorly within the skull. Findings are suspicious for metastases.\"  - 1/31/19: CT chest with contrast - \" No suspicious nodules in the " "chest.  Stable appearance of a 5.8 cm right renal mass concerning for renal carcinoma until proven otherwise.  Stable indeterminant subcentimeter hypodensities in the liver.  Multifocal osteoblastic metastasis including 2.5 cm lesion in the right fifth rib posteriorly and a 1.6 cm lesion in the right third rib posteriorly. No lytic lesions identified.\"  - 2/6/19: CT-guided right sclerotic fifth rib lesion biopsy - \"Metastatic carcinoma, consistent with prostate primary.  Immunohistochemical stains performed show the metastatic carcinoma stains positive for NKX3.1 (prostate marker), negative for STACIE 3 and PAX8, which supports the above diagnosis.\"  - 2/15/19: Started Casodex 50mg every day and consented for the biobanking protocol. 3/18/19 - stopped Casodex.  - 2/19/19: Case discussed in tumor board - recommendation for nephectomy for suspected malignant right renal mass.   - 2/26/19: Started Lupron 22.5mg every 3 months.   - 5/8/19: Started Docetaxel 75mg/m2 IV every 3 weeks. 06/18/19 - cycle 3, 7/10/19 - cycle 4, 07/31/19 - cycle 5, 08/21/19 - cycle 6.   - 10/2/19: Restaging CT CAP and NM Bone Scan showed improved osseous disease, no evidence of recurrent or new metastatic disease.  - 1/5/20: Restaging CT CAP and NM Bone Scan showed stable osseous disease, no evidence of recurrent or new metastatic disease.     2. Stage III ([id number]), grade 3 of 4, clear-cell RCC of right kidney:  - Incidentally diagnosed as above.   - Underwent curative-intent robotic right radical nephrectomy with Dr. Wesley Cleveland on 3/19/19. No tumor spillage per op report.   - Path showed: \"Histologic Type: Clear cell renal cell carcinoma; Sarcomatoid Features: Not identified; Histologic Grade: Nucleolar grade 3 (WHO/ISUP). Extent Tumor Size: 4.3 cm. Microscopic Tumor Extension: Tumor extension into renal sinus (in vascular structures). Margins: Negative. Tumor Necrosis: Present; focal. Lymph-Vascular Invasion: Not identified.  Pathologic " "Staging (pTNM) Primary Tumor (pT): pT3a: Tumor extends into renal vein branches/renal sinus. Regional Lymph Nodes (pN): pNX. Number of Lymph Nodes Examined: 0 Distant Metastasis (pM): pM N/A.\"  - Restaging scans as above.    PAST MEDICAL HISTORY:  Past Medical History:   Diagnosis Date     Cancer of kidney, right (H)      Complication of anesthesia     slow wakeup      Malignant neoplasm of prostate metastatic to bone (H) 2/14/2019     Thyroid disease      PAST SURGICAL HISTORY:   Past Surgical History:   Procedure Laterality Date     CYSTOSCOPY      about 10 years ago     INSERT PORT VASCULAR ACCESS Right 5/2/2019    Procedure: Chest Port Placement;  Surgeon: Tate Burciaga PA-C;  Location: [facility] OR     IR CHEST PORT PLACEMENT > 5 YRS OF AGE  5/2/2019     LAPAROSCOPIC NEPHRECTOMY Right 3/19/2019    Procedure: Right laparoscopic radical nephrectomy;  Surgeon: Wesley Cleveland MD;  Location: [facility] OR      SOCIAL HISTORY:   Social History     Tobacco Use     Smoking status: Never Smoker     Smokeless tobacco: Never Used   Substance Use Topics     Alcohol use: None     Comment: wine - very rare     Drug use: No     FAMILY HISTORY:   Family History   Problem Relation Age of Onset     Diabetes Father      ALLERGIES:   Allergies   Allergen Reactions     Doxycycline GI Disturbance     CURRENT MEDICATIONS:   Current Outpatient Medications:      amLODIPine (NORVASC) 10 MG tablet, Take 1 tablet (10 mg) by mouth daily, Disp: 30 tablet, Rfl: 0     atorvastatin (LIPITOR) 20 MG tablet, Take 20 mg by mouth daily, Disp: , Rfl:      calcium citrate-vitamin D (CITRACAL) 315-250 MG-UNIT TABS per tablet, Take 650 mg by mouth 2 times daily , Disp: , Rfl:      cetirizine (ZYRTEC) 10 MG tablet, Take 10 mg by mouth as needed, Disp: , Rfl:      cinnamon 500 MG CAPS, Take 500 mg by mouth daily , Disp: , Rfl:      cycloSPORINE (RESTASIS) 0.05 % ophthalmic emulsion, Place 1 drop into both eyes 2 times daily , Disp: , Rfl:      " fluticasone (FLONASE) 50 MCG/ACT nasal spray, Spray 2 sprays in nostril daily as needed , Disp: , Rfl:      Glucosamine-Chondroit-Vit C-Mn (GLUCOSAMINE 1500 COMPLEX) CAPS, Take 1 capsule by mouth daily, Disp: , Rfl:      levothyroxine (SYNTHROID/LEVOTHROID) 125 MCG tablet, Take 125 mcg by mouth daily, Disp: , Rfl:      Multiple Vitamins-Minerals (MULTIVITAMIN ADULT EXTRA C PO), Take 1 tablet by mouth every 24 hours, Disp: , Rfl:      Nutritional Supplements (SALMON OIL) CAPS, Take 1 capsule by mouth daily, Disp: , Rfl:      omeprazole (PRILOSEC) 20 MG DR capsule, Take 20 mg by mouth daily, Disp: , Rfl:      prochlorperazine (COMPAZINE) 10 MG tablet, Take 1 tablet (10 mg) by mouth every 6 hours as needed (Nausea/Vomiting), Disp: 30 tablet, Rfl: 5    PHYSICAL EXAMINATION:  VITALS: /77   Pulse 81   Temp 98.6  F (37  C) (Oral)   Wt 111.2 kg (245 lb 1.6 oz)   SpO2 97%   BMI 33.24 kg/m      GENERAL: The patient is a pleasant male in no acute distress.  HEENT: EOMI. Sclerae are anicteric. Oral mucosa is pink and moist.   Lymph: Neck is supple with no lymphadenopathy in the cervical or supraclavicular areas.   Heart: Regular rate and rhythm.   Lungs: Clear to auscultation bilaterally. Normal respiratory effort.  Abdomen: Bowel sounds present, soft, nontender with no palpable hepatosplenomegaly or masses.   Extremities: No lower extremity edema noted bilaterally.  Neuro: Alert and fully oriented, no focal deficits.  Skin: No rashes, petechiae, or bruising noted on exposed skin.    LABORATORY DATA:   Lab Test 01/06/20  0858 10/02/19  0955 08/21/19  1436   WBC 4.2 5.1 10.6   RBC 4.91 4.46 4.27*   HGB 13.9 13.2* 12.9*   HCT 42.8 40.3 38.6*   MCV 87 90 90   MCH 28.3 29.6 30.2   MCHC 32.5 32.8 33.4   RDW 14.1 13.5 13.6    186 191   NEUTROPHIL 52.4 49.8 89.1    142 141   POTASSIUM 4.1 4.0 3.8   CHLORIDE 108 108 108   CO2 28 27 26   ANIONGAP 5 7 6   * 84 130*   BUN 21 26 20   CR 1.13 1.08 1.03   BETHANY  "9.4 8.9 9.2   PROTTOTAL 7.2 6.7* 7.2   ALBUMIN 4.0 3.6 3.8   BILITOTAL 0.4 0.4 0.4   ALKPHOS 65 55 52   AST 11 16 15   ALT 30 21 29     Lab Test 01/06/20  0858 10/02/19  0955 08/21/19  1436 07/31/19  1225 07/10/19  1332 06/18/19  1005   PSA 0.44 0.58 0.71 0.95 0.82 0.80   CGAB  --  736* 597* 509* 646* 646*   TESTOSTTOTAL 16* 16* <2* 2* 3*  --    CGAB - Chromogranin A; TESTOSTTOTAL: Total testosterone.    Lab Test 08/21/19  1436 07/31/19  1225 07/10/19  1332 06/18/19  1005 05/29/19  1217    219 218 220 232*     IMAGING:  Reviewed CT CAP and NM Bone Scan from 10/2/19:  - Noted sclerotic lesions at C7 and T6 are not apparent on today's CT, other sclerotic foci in bony pelvis and left femoral neck are unchanged.  - Osseous lesions are stable to slightly less impressive compared to the prior study. No new findings.    ASSESSMENT & PLAN: Mr. Reyes is a delightful 59 year old gentleman with recently diagnosed metastatic castration sensitive prostate adenocarcinoma as well as an incidentally diagnosed stage III clear-cell renal cell carcinoma of the right kidney (s/p radical R nephrectomy 3/19/19), who is here for a follow-up visit after completion of docetaxel for prostate cancer.     1. Metastatic prostate cancer, with involvement of the bones and RPLN:   - Patient met the criteria for CHAARTED \"high-volume\" metastatic hormone-sensitive prostate cancer.   - After a review of systemic therapy options, patient opted to start docetaxel in combination with ADT (per JOSEFA, [id number], STAMPEDE). He tolerated 6 cycles of docetaxel fairly well (5/8/19 through 8/21/19), with the exception of mild fatigue and mild neuropathy.  - Restaging CT CAP and NM Bone Scan from y'day shows essentially stable disease. No evidence of recurrence or further metastatic disease.  - PSA continues a gradual down trend with some variability. No clinical evidence of progression.  - Continue Lupron 22.5mg every 3 months - last given " 19.  - He understands the risks and benefits of ADT including hot flashes, mood changes and long-term cardiovascular, bone health, etc. Also understands the risks of docetaxel treatment including fatigue, nausea, vomiting, diarrhea/constipation, hand-foot syndrome, allergic reactions, myelosuppression with increased risk of infection and bleeding etc.    2. Bone metastases:   - Noted to have osseous metastatic disease at the time of diagnosis, which has improved now following 6 cycles of docetaxel.  - Encouraged to continue calcium and vitamin D supplementation.  - Discussed role of bone anti-resorptive therapies again - given young age and high-volume [facility], will plan on adding Zometa on follow-up visit. Rx will be every 3 months for 2 years total. Pt to get dental workup completed before then.     3. Stage 3, UISS-high risk ccRCC:  - Patient understands that he has a stage III, UISS high risk clear-cell renal cell carcinoma with approximately 40-50% risk of recurrence after definitive surgery.   - At this time, he has no clear evidence of residual disease. The retroperitoneal lymphadenopathy is more consistent with metastatic prostate cancer and is improving/resolved.   - Inbasket sent to Dr. Cleveland to seek input and help manage the bladder dome CT finding.  - Continue active surveillance with self-reporting for signs/symptoms of recurrence (this was previously explained in detail) and periodic H&P and scans.      4. Genetics referral:  - Was referred and seen by Genetics on 3/7/19. No evidence of inherited cancer syndromes found on work-up.      5. Research participation:  - There were no available clinical trials for this gentleman at the time of diagnosis, especially given concurrent malignancy.   - Enrolled in the institutional biobanking study on 2/15/19.     Return to clinic in 2.5 months with labs, scans and next doses of Lupron plus Zometa.    BILLIN.     Uday Smalls M.D.  [employer]. Professor of  Medicine  Division of Hematology, Oncology & Transplantation  [facility]

## 2020-01-12 PROBLEM — C61 MALIGNANT NEOPLASM OF PROSTATE METASTATIC TO BONE (H): Status: ACTIVE | Noted: 2019-01-16

## 2020-01-12 PROBLEM — C64.1 RENAL CELL CARCINOMA, RIGHT (H): Status: ACTIVE | Noted: 2019-01-25

## 2020-01-12 PROBLEM — C79.51 MALIGNANT NEOPLASM OF PROSTATE METASTATIC TO BONE (H): Status: ACTIVE | Noted: 2019-01-16

## 2020-01-16 ENCOUNTER — TELEPHONE (OUTPATIENT)
Dept: ONCOLOGY | Facility: CLINIC | Age: 60
End: 2020-01-16

## 2020-01-16 NOTE — TELEPHONE ENCOUNTER
----- Message from Uday Smalls MD sent at 1/15/2020  4:16 PM CST -----  Regarding: RE: bladder dome CT finding needs your attn  Sarah, can you call Walter and say that the final CT report had a small bladder lesion that I don't believe represents existing or new cancer, but that me and Dr. Cleveland would like to offer a cystoscopy (bladder evaluation with a scope) and that Dr. Cleveland's team will be reaching out to him soon to schedule.    Thanks,  AR  ----- Message -----  From: Wesley Cleveland MD  Sent: 1/15/2020  12:22 PM CST  To: Uday Smalls MD, Stephie Zimmerman RN  Subject: RE: bladder dome CT finding needs your attn      Not too worried about this, but probably reasonable to have an office cystoscopy to take a peek at it.    Stephie, can you set him up for a cysto at some point in the next several weeks?  Ino Blevins     ----- Message -----  From: Uday Smalls MD  Sent: 1/7/2020   3:46 PM CST  To: Wesley Cleveland MD  Subject: bladder dome CT finding needs your attn          Rivera Mckinnon,   Can you take a look at the CT scan for our mutual pt with mHSPC and stage 3 RCC? Shows an incidental bladder finding which doesn't appear to have clinical manifestations.   Thanks,  Uday

## 2020-01-16 NOTE — TELEPHONE ENCOUNTER
Spoke with Walter. He stated Urology had called him yesterday to schedule cystoscopy for 2/3. Reviewed Dr. Smalls's reasoning for the cystoscopy. Walter verbalized understanding of plan.     Encouraged Walter to call with any additional questions or concerns.     KM DohertyN, RN  RN Care Coordinator  Dr. Smalls

## 2020-01-18 ENCOUNTER — PRE VISIT (OUTPATIENT)
Dept: UROLOGY | Facility: CLINIC | Age: 60
End: 2020-01-18

## 2020-01-18 NOTE — TELEPHONE ENCOUNTER
Reason for Visit: Cystoscopy - per Max    Diagnosis: RCC, right (H)    Orders/Procedures/Records: Records available    Contact Patient: N/A    Rooming Requirements: Cystoscopy, collect urine      Monet Curiel  01/18/20  9:08 AM

## 2020-02-03 ENCOUNTER — OFFICE VISIT (OUTPATIENT)
Dept: UROLOGY | Facility: CLINIC | Age: 60
End: 2020-02-03

## 2020-02-03 VITALS
HEIGHT: 72 IN | HEART RATE: 57 BPM | SYSTOLIC BLOOD PRESSURE: 120 MMHG | DIASTOLIC BLOOD PRESSURE: 83 MMHG | WEIGHT: 245 LBS | BODY MASS INDEX: 33.18 KG/M2

## 2020-02-03 DIAGNOSIS — C64.1 RENAL CELL CARCINOMA, RIGHT (H): ICD-10-CM

## 2020-02-03 DIAGNOSIS — N32.9 LESION OF BLADDER: Primary | ICD-10-CM

## 2020-02-03 ASSESSMENT — PAIN SCALES - GENERAL: PAINLEVEL: NO PAIN (0)

## 2020-02-03 ASSESSMENT — MIFFLIN-ST. JEOR: SCORE: 1974.31

## 2020-02-03 NOTE — NURSING NOTE
Chief Complaint   Patient presents with     Cystoscopy     RCC         Patient Active Problem List   Diagnosis     Allergic rhinitis     Dyslipidemia     Esophageal reflux     Glucose intolerance     Hypothyroidism     Obesity     Malignant neoplasm of prostate metastatic to bone (H)     Renal cell carcinoma, right (H)     Elevated PSA     Essential hypertension       Allergies   Allergen Reactions     Doxycycline GI Disturbance       Current Outpatient Medications   Medication Sig Dispense Refill     amLODIPine (NORVASC) 10 MG tablet Take 1 tablet (10 mg) by mouth daily 30 tablet 0     atorvastatin (LIPITOR) 20 MG tablet Take 20 mg by mouth daily       calcium citrate-vitamin D (CITRACAL) 315-250 MG-UNIT TABS per tablet Take 650 mg by mouth 2 times daily        cetirizine (ZYRTEC) 10 MG tablet Take 10 mg by mouth as needed       cinnamon 500 MG CAPS Take 500 mg by mouth daily        cycloSPORINE (RESTASIS) 0.05 % ophthalmic emulsion Place 1 drop into both eyes 2 times daily        fluticasone (FLONASE) 50 MCG/ACT nasal spray Spray 2 sprays in nostril daily as needed        Glucosamine-Chondroit-Vit C-Mn (GLUCOSAMINE 1500 COMPLEX) CAPS Take 1 capsule by mouth daily       levothyroxine (SYNTHROID/LEVOTHROID) 125 MCG tablet Take 125 mcg by mouth daily       Multiple Vitamins-Minerals (MULTIVITAMIN ADULT EXTRA C PO) Take 1 tablet by mouth every 24 hours       Nutritional Supplements (SALMON OIL) CAPS Take 1 capsule by mouth daily       omeprazole (PRILOSEC) 20 MG DR capsule Take 20 mg by mouth daily       prochlorperazine (COMPAZINE) 10 MG tablet Take 1 tablet (10 mg) by mouth every 6 hours as needed (Nausea/Vomiting) 30 tablet 5       Social History     Tobacco Use     Smoking status: Never Smoker     Smokeless tobacco: Never Used   Substance Use Topics     Alcohol use: Not on file     Comment: wine - very rare     Drug use: No       Invasive Procedure Safety Checklist:    Procedure: Cystoscopy    Action: Complete  sections and checkboxes as appropriate.    Pre-procedure:  1. Patient ID Verified with 2 identifiers (Zuly and  or MRN) : YES    2. Procedure and site verified with patient/designee (when able) : YES    3. Accurate consent documentation in medical record : YES    4. H&P (or appropriate assessment) documented in medical record : N/A  H&P must be up to 30 days prior to procedure an updated within 24 hours of                 Procedure as applicable.     5. Relevant diagnostic and radiology test results appropriately labeled and displayed as applicable : YES    6. Blood products, implants, devices, and/or special equipment available for the procedure as applicable : YES    7. Procedure site(s) marked with provider initials [Exclusions: none] : NO    8. Marking not required. Reason : Yes  Procedure does not require site marking    Time Out:     Time-Out performed immediately prior to starting procedure, including verbal and active participation of all team members addressing: YES    1. Correct patient identity.  2. Confirmed that the correct side and site are marked.  3. An accurate procedure to be done.  4. Agreement on the procedure to be done.  5. Correct patient position.  6. Relevant images and results are properly labeled and appropriately displayed.  7. The need to administer antibiotics or fluids for irrigation purposes during the procedure as applicable.  8. Safety precautions based on patient history or medication use.    During Procedure: Verification of correct person, site, and procedure occurs any time the responsibility for care of the patient is transferred to another member of the care team.    The following medication was given:     MEDICATION:  Lidocaine without epinephrine 2% jelly  ROUTE: Urethral  SITE: Urethra via the meatus  DOSE: 10 mL  LOT #: [id number]  : [employer]  EXPIRATION DATE:   NDC#: [address]-77   Was there drug waste? No    Prior to med  admin, verified patient identity using patient's name and date of birth.  Due to med administration, patient instructed to remain in clinic for 15 minutes  afterwards, and to report any adverse reaction to me immediately.    Drug Amount Wasted:  None.  Vial/Syringe: Syringe      Monet Curiel  2/3/2020  6:43 AM

## 2020-02-03 NOTE — PROGRESS NOTES
CHIEF COMPLAINT   It was my pleasure to see Walter Reyes who is a 59 year old male for follow-up of metastatic prostate cancer, renal cell carcinoma, and bladder lesion.      HPI  Walter Reyes is a very pleasant 59 year old male who presents with a history of renal cell carcinoma. S/p right lap nephrectomy 3/19/2019, Grade 3 clear cell RCC, stage pT3a. Also with metastatic prostate cancer and follows with Dr. Smalls. He has completed docetaxel for his metastatic hormone-sensitive prostate cancer, currently on ADT alone.    Returns today secondary to finding on CT of possible area of enhancement at dome of bladder noted on recent CT. Of note, this is stable from old CT scans. Patient notes no hematuria or dysuria.     CT chest/abd/pelvis 1/6/20  IMPRESSION: In this patient with a history of metastatic prostate  cancer and right renal cell carcinoma status post nephrectomy,  1. No evidence of worsening metastatic disease.  2. Unchanged size and appearance of the sclerotic lesions involving  the left femoral neck and the scattered sclerotic lesions throughout  bilateral ribs. No new osseous lesions are identified.  3. Enhancing focus in the superior bladder dome of indeterminate  clinical significance and appears stable since 5/3/2019.  4. Large sliding-type hiatal hernia.      Nephrectomy 3/19/2019   Specimen        Procedure:    Radical nephrectomy        Specimen Laterality:   Right   Tumor        Histologic Type:   Clear cell renal cell carcinoma        Sarcomatoid Features:   Not identified        Histologic Grade:    Nucleolar grade 3 (WHO/ISUP).   Extent        Tumor Size:   4.3 cm.    Pathologic Staging (pTNM)        Primary Tumor (pT):    pT3a: Tumor extends into renal vein   branches/renal sinus.     PHYSICAL EXAM  Patient is a 59 year old  male   Vitals: Blood pressure 120/83, pulse 57, height 1.829 m (6'), weight 111.1 kg (245 lb).  General Appearance Adult: Body mass index is 33.23  kg/m .  Alert, no acute distress, oriented  HENT: throat/mouth:normal, good dentition  Lungs: no respiratory distress, or pursed lip breathing  Heart: No obvious jugular venous distension present  Abdomen: soft, nontender, no organomegaly or masses  Back: no CVAT  Musculoskeltal: extremities normal, no peripheral edema  Skin: no suspicious lesions or rashes  Neuro: Alert, oriented, speech and mentation normal  Psych: affect and mood normal  Gait: Normal  : penis, scrotum, testes normal    Office Cystoscopy 2/3/2020  Pre-procedure diagnosis:  Bladder Lesion  Post-procedure diagnosis: Normal cystoscopy  Procedure performed:  Cystourethroscopy  Surgeon:    Wesley Cleveland MD  Anesthesia:    Local    Description of procedure:   After fully informed, voluntary consent was obtained, the patient was brought into the procedure room, identified and placed in a supine position on the cystoscopy table.  The groin/scrotum were prepped with betadine and draped in a sterile fashion.  Urojet lidocaine gel was introduced.  A 15F flexible cystoscope was inserted into the urethra, and the bladder and urethra were examined in a systematic manner.  The patient tolerated the procedure well and there were no complications.      Cystoscopic findings:  The urethra was normal without strictures.  The prostate was 3cm long and demonstrated moderate bilobar hypertrophy.  There was no median lobe.  The external sphincter coapted well and the bladder neck was open. The bladder was completely surveyed.  There was mild trabeculation.  There were no neoplasms, stones, or diverticula identifed.  The ureteric orifices were normal in position and number. No suspicious lesions noted. Mild edema noted near dome of bladder, but no suspicious lesions in area noted on CT.    ASSESSMENT and PLAN  59 year old male with pT3a clear cell renal cell carcinoma, now s/p right nephrectomy completed 3/19/2019. He also has metastatic prostate cancer and is on ADT,  post-docetaxel under the care of Dr. Smalls. Incidental finding of small lesion on dome of bladder on last CT. This does not correspond to any lesion of significance noted on cystoscopy today. No suspicious lesions identified on today's cystoscopy. At this point, would be reasonable to observe this area as it has been stable on last couple of CT scans. If area demonstrates interval growth or worrisome change on subsequent CT scans, can consider biopsy of this area in the future, but given small size and indeterminate nature of this lesion, will observe for now. He will let me know if he notes hematuria or new symptoms.    Given his multiple malignancies and well coordinated care with Dr. Smalls, I will defer further surveillance and follow-up to the medical oncology team. I am happy to see Mr. Reyes back at any time if there are questions or concerns. He will otherwise follow-up with me as needed.      - Urine for cytology today  - Will defer further surveillance visits and scans to Dr. Smalls and the medical oncology team for both prostate and renal cell carcinoma.  - Follow-up with urology as needed    Wesley Cleveland MD  Urology  [facility]

## 2020-02-03 NOTE — PATIENT INSTRUCTIONS
"Please follow up as needed.      It was a pleasure meeting with you today.  Thank you for allowing me and my team the privilege of caring for you today.  YOU are the reason we are here, and I truly hope we provided you with the excellent service you deserve.  Please let us know if there is anything else we can do for you so that we can be sure you are leaving completely satisfied with your care experience.          AFTER YOUR CYSTOSCOPY        You have just completed a cystoscopy, or \"cysto\", which allowed your physician to learn more about your bladder (or to remove a stent placed after surgery). We suggest that you continue to avoid caffeine, fruit juice, and alcohol for the next 24 hours, however, you are encouraged to return to your normal activities.         A few things that are considered normal after your cystoscopy:     * Small amount of bleeding (or spotting) that clears within the next 24 hours     * Slight burning sensation with urination     * Sensation to of needing to avoid more frequently     * The feeling of \"air\" in your urine     * Mild discomfort that is relieved with Tylenol        Please contact our office promptly if you:     * Develop a fever above 101 degrees     * Are unable to urinate     * Develop bright red blood that does not stop     * Severe pain or swelling         Please contact our office with any concerns or questions @[facility].  "

## 2020-02-03 NOTE — LETTER
2/3/2020    RE: Walter Reyes  [address] [place] [facility] [id number]     CHIEF COMPLAINT   It was my pleasure to see Walter Reyes who is a 59 year old male for follow-up of metastatic prostate cancer, renal cell carcinoma, and bladder lesion.      HPI  Walter Reyes is a very pleasant 59 year old male who presents with a history of renal cell carcinoma. S/p right lap nephrectomy 3/19/2019, Grade 3 clear cell RCC, stage pT3a. Also with metastatic prostate cancer and follows with Dr. Smalls. He  has completed docetaxel for his metastatic hormone-sensitive prostate cancer, currently on ADT alone.    Returns today secondary to finding on CT of possible area of enhancement at dome of bladder noted on recent CT. Of note, this is stable from old CT scans. Patient notes no hematuria or dysuria.     CT chest/abd/pelvis 1/6/20  IMPRESSION: In this patient with a history of metastatic prostate  cancer and right renal cell carcinoma status post nephrectomy,  1. No evidence of worsening metastatic disease.  2. Unchanged size and appearance of the sclerotic lesions involving  the left femoral neck and the scattered sclerotic lesions throughout  bilateral ribs. No new osseous lesions are identified.  3. Enhancing focus in the superior bladder dome of indeterminate  clinical significance and appears stable since 5/3/2019.  4. Large sliding-type hiatal hernia.      Nephrectomy 3/19/2019   Specimen        Procedure:    Radical nephrectomy        Specimen Laterality:   Right   Tumor        Histologic Type:   Clear cell renal cell carcinoma        Sarcomatoid Features:   Not identified        Histologic Grade:    Nucleolar grade 3 (WHO/ISUP).   Extent        Tumor Size:   4.3 cm.    Pathologic Staging (pTNM)        Primary Tumor (pT):    pT3a: Tumor extends into renal vein   branches/renal sinus.     PHYSICAL EXAM  Patient is a 59 year old  male   Vitals: Blood pressure 120/83, pulse 57, height 1.829  m (6'), weight 111.1 kg (245 lb).  General Appearance Adult: Body mass index is 33.23 kg/m .  Alert, no acute distress, oriented  HENT: throat/mouth:normal, good dentition  Lungs: no respiratory distress, or pursed lip breathing  Heart: No obvious jugular venous distension present  Abdomen: soft, nontender, no organomegaly or masses  Back: no CVAT  Musculoskeltal: extremities normal, no peripheral edema  Skin: no suspicious lesions or rashes  Neuro: Alert, oriented, speech and mentation normal  Psych: affect and mood normal  Gait: Normal  : penis, scrotum, testes normal    Office Cystoscopy 2/3/2020  Pre-procedure diagnosis:  Bladder Lesion  Post-procedure diagnosis: Normal cystoscopy  Procedure performed:  Cystourethroscopy  Surgeon:    Wesley Cleveland MD  Anesthesia:    Local    Description of procedure:   After fully informed, voluntary consent was obtained, the patient was brought into the procedure room, identified and placed in a supine position on the cystoscopy table.  The groin/scrotum were prepped with betadine and draped in a sterile fashion.  Urojet lidocaine gel was introduced.  A 15F flexible cystoscope was inserted into the urethra, and the bladder and urethra were examined in a systematic manner.  The patient tolerated the procedure well and there were no complications.      Cystoscopic findings:  The urethra was normal without strictures.  The prostate was 3cm long and demonstrated moderate bilobar hypertrophy.  There was no median lobe.  The external sphincter coapted well and the bladder neck was open. The bladder was completely surveyed.  There was mild trabeculation.  There were no neoplasms, stones, or diverticula identifed.  The ureteric orifices were normal in position and number. No suspicious lesions noted. Mild edema noted near dome of bladder, but no suspicious lesions in area noted on CT.    ASSESSMENT and PLAN  59 year old male with pT3a clear cell renal cell carcinoma, now s/p right  nephrectomy completed 3/19/2019. He also has metastatic prostate cancer and is on ADT, post-docetaxel under the care of Dr. Smalls. Incidental finding of small lesion on dome of bladder on last CT. This does not correspond to any lesion of significance noted on cystoscopy today. No suspicious lesions identified on today's cystoscopy. At this point, would be reasonable to observe this area as it has been stable on last couple of CT scans. If area demonstrates interval growth or worrisome change on subsequent CT scans, can consider biopsy of this area in the future, but given small size and indeterminate nature of this lesion, will observe for now. He will let me know if he notes hematuria or new symptoms.    Given his multiple malignancies and well coordinated care with Dr. Smalls, I will defer further surveillance and follow-up to the medical oncology team. I am happy to see Mr. Reyes back at any time if there are questions or concerns. He will otherwise follow-up with me as needed.      - Urine for cytology today  - Will defer further surveillance visits and scans to Dr. Smalls and the medical oncology team for both prostate and renal cell carcinoma.  - Follow-up with urology as needed    Wesley Cleveland MD  Urology  [facility]

## 2020-02-04 LAB — COPATH REPORT: NORMAL

## 2020-04-06 ENCOUNTER — HOSPITAL ENCOUNTER (OUTPATIENT)
Dept: NUCLEAR MEDICINE | Facility: CLINIC | Age: 60
Setting detail: NUCLEAR MEDICINE
End: 2020-04-06
Attending: INTERNAL MEDICINE
Payer: COMMERCIAL

## 2020-04-06 ENCOUNTER — HOSPITAL ENCOUNTER (OUTPATIENT)
Dept: CT IMAGING | Facility: CLINIC | Age: 60
End: 2020-04-06
Attending: INTERNAL MEDICINE
Payer: COMMERCIAL

## 2020-04-06 DIAGNOSIS — C64.1 RENAL CELL CARCINOMA, RIGHT (H): ICD-10-CM

## 2020-04-06 DIAGNOSIS — C79.51 MALIGNANT NEOPLASM OF PROSTATE METASTATIC TO BONE (H): ICD-10-CM

## 2020-04-06 DIAGNOSIS — C61 MALIGNANT NEOPLASM OF PROSTATE METASTATIC TO BONE (H): ICD-10-CM

## 2020-04-06 PROCEDURE — A9503 TC99M MEDRONATE: HCPCS | Performed by: INTERNAL MEDICINE

## 2020-04-06 PROCEDURE — 74177 CT ABD & PELVIS W/CONTRAST: CPT

## 2020-04-06 PROCEDURE — 34300033 ZZH RX 343: Performed by: INTERNAL MEDICINE

## 2020-04-06 PROCEDURE — 25000128 H RX IP 250 OP 636: Performed by: INTERNAL MEDICINE

## 2020-04-06 PROCEDURE — 78306 BONE IMAGING WHOLE BODY: CPT

## 2020-04-06 RX ORDER — HEPARIN SODIUM (PORCINE) LOCK FLUSH IV SOLN 100 UNIT/ML 100 UNIT/ML
100 SOLUTION INTRAVENOUS ONCE
Status: COMPLETED | OUTPATIENT
Start: 2020-04-06 | End: 2020-04-06

## 2020-04-06 RX ORDER — TC 99M MEDRONATE 20 MG/10ML
20-30 INJECTION, POWDER, LYOPHILIZED, FOR SOLUTION INTRAVENOUS ONCE
Status: COMPLETED | OUTPATIENT
Start: 2020-04-06 | End: 2020-04-06

## 2020-04-06 RX ORDER — IOPAMIDOL 755 MG/ML
135 INJECTION, SOLUTION INTRAVASCULAR ONCE
Status: COMPLETED | OUTPATIENT
Start: 2020-04-06 | End: 2020-04-06

## 2020-04-06 RX ADMIN — IOPAMIDOL 135 ML: 755 INJECTION, SOLUTION INTRAVENOUS at 10:19

## 2020-04-06 RX ADMIN — HEPARIN SODIUM (PORCINE) LOCK FLUSH IV SOLN 100 UNIT/ML 100 ML: 100 SOLUTION at 10:30

## 2020-04-06 RX ADMIN — TC 99M MEDRONATE 24.9 MCI.: 20 INJECTION, POWDER, LYOPHILIZED, FOR SOLUTION INTRAVENOUS at 09:59

## 2020-04-08 ENCOUNTER — INFUSION THERAPY VISIT (OUTPATIENT)
Dept: ONCOLOGY | Facility: CLINIC | Age: 60
End: 2020-04-08
Attending: INTERNAL MEDICINE
Payer: COMMERCIAL

## 2020-04-08 ENCOUNTER — APPOINTMENT (OUTPATIENT)
Dept: LAB | Facility: CLINIC | Age: 60
End: 2020-04-08
Attending: INTERNAL MEDICINE
Payer: COMMERCIAL

## 2020-04-08 VITALS
BODY MASS INDEX: 34.51 KG/M2 | DIASTOLIC BLOOD PRESSURE: 92 MMHG | HEIGHT: 72 IN | RESPIRATION RATE: 16 BRPM | SYSTOLIC BLOOD PRESSURE: 129 MMHG | TEMPERATURE: 97.8 F | OXYGEN SATURATION: 97 % | WEIGHT: 254.8 LBS | HEART RATE: 71 BPM

## 2020-04-08 VITALS — SYSTOLIC BLOOD PRESSURE: 126 MMHG | DIASTOLIC BLOOD PRESSURE: 81 MMHG | HEART RATE: 61 BPM

## 2020-04-08 DIAGNOSIS — C79.51 MALIGNANT NEOPLASM OF PROSTATE METASTATIC TO BONE (H): Primary | ICD-10-CM

## 2020-04-08 DIAGNOSIS — C61 MALIGNANT NEOPLASM OF PROSTATE METASTATIC TO BONE (H): Primary | ICD-10-CM

## 2020-04-08 LAB
ALBUMIN SERPL-MCNC: 4 G/DL (ref 3.4–5)
ALP SERPL-CCNC: 75 U/L (ref 40–150)
ALT SERPL W P-5'-P-CCNC: 25 U/L (ref 0–70)
ANION GAP SERPL CALCULATED.3IONS-SCNC: 5 MMOL/L (ref 3–14)
AST SERPL W P-5'-P-CCNC: 16 U/L (ref 0–45)
BASOPHILS # BLD AUTO: 0 10E9/L (ref 0–0.2)
BASOPHILS NFR BLD AUTO: 0.3 %
BILIRUB SERPL-MCNC: 0.5 MG/DL (ref 0.2–1.3)
BUN SERPL-MCNC: 20 MG/DL (ref 7–30)
CALCIUM SERPL-MCNC: 9.4 MG/DL (ref 8.5–10.1)
CHLORIDE SERPL-SCNC: 107 MMOL/L (ref 94–109)
CO2 SERPL-SCNC: 28 MMOL/L (ref 20–32)
CREAT SERPL-MCNC: 1.08 MG/DL (ref 0.66–1.25)
DIFFERENTIAL METHOD BLD: ABNORMAL
EOSINOPHIL # BLD AUTO: 0.2 10E9/L (ref 0–0.7)
EOSINOPHIL NFR BLD AUTO: 2 %
ERYTHROCYTE [DISTWIDTH] IN BLOOD BY AUTOMATED COUNT: 13 % (ref 10–15)
GFR SERPL CREATININE-BSD FRML MDRD: 75 ML/MIN/{1.73_M2}
GLUCOSE SERPL-MCNC: 102 MG/DL (ref 70–99)
HCT VFR BLD AUTO: 39.5 % (ref 40–53)
HGB BLD-MCNC: 13.3 G/DL (ref 13.3–17.7)
IMM GRANULOCYTES # BLD: 0 10E9/L (ref 0–0.4)
IMM GRANULOCYTES NFR BLD: 0.3 %
LYMPHOCYTES # BLD AUTO: 1.8 10E9/L (ref 0.8–5.3)
LYMPHOCYTES NFR BLD AUTO: 16.9 %
MCH RBC QN AUTO: 30.2 PG (ref 26.5–33)
MCHC RBC AUTO-ENTMCNC: 33.7 G/DL (ref 31.5–36.5)
MCV RBC AUTO: 90 FL (ref 78–100)
MONOCYTES # BLD AUTO: 0.7 10E9/L (ref 0–1.3)
MONOCYTES NFR BLD AUTO: 6.6 %
NEUTROPHILS # BLD AUTO: 7.7 10E9/L (ref 1.6–8.3)
NEUTROPHILS NFR BLD AUTO: 73.9 %
NRBC # BLD AUTO: 0 10*3/UL
NRBC BLD AUTO-RTO: 0 /100
PLATELET # BLD AUTO: 226 10E9/L (ref 150–450)
POTASSIUM SERPL-SCNC: 4 MMOL/L (ref 3.4–5.3)
PROT SERPL-MCNC: 7.6 G/DL (ref 6.8–8.8)
PSA SERPL-MCNC: 0.41 UG/L (ref 0–4)
RBC # BLD AUTO: 4.41 10E12/L (ref 4.4–5.9)
SODIUM SERPL-SCNC: 140 MMOL/L (ref 133–144)
WBC # BLD AUTO: 10.4 10E9/L (ref 4–11)

## 2020-04-08 PROCEDURE — 96365 THER/PROPH/DIAG IV INF INIT: CPT

## 2020-04-08 PROCEDURE — 85025 COMPLETE CBC W/AUTO DIFF WBC: CPT | Performed by: INTERNAL MEDICINE

## 2020-04-08 PROCEDURE — 99215 OFFICE O/P EST HI 40 MIN: CPT | Mod: ZP | Performed by: INTERNAL MEDICINE

## 2020-04-08 PROCEDURE — 25800030 ZZH RX IP 258 OP 636: Mod: ZF | Performed by: INTERNAL MEDICINE

## 2020-04-08 PROCEDURE — 80053 COMPREHEN METABOLIC PANEL: CPT | Performed by: INTERNAL MEDICINE

## 2020-04-08 PROCEDURE — 86316 IMMUNOASSAY TUMOR OTHER: CPT | Performed by: INTERNAL MEDICINE

## 2020-04-08 PROCEDURE — G0463 HOSPITAL OUTPT CLINIC VISIT: HCPCS | Mod: ZF

## 2020-04-08 PROCEDURE — 96402 CHEMO HORMON ANTINEOPL SQ/IM: CPT

## 2020-04-08 PROCEDURE — 84153 ASSAY OF PSA TOTAL: CPT | Performed by: INTERNAL MEDICINE

## 2020-04-08 PROCEDURE — 25000128 H RX IP 250 OP 636: Mod: ZF | Performed by: INTERNAL MEDICINE

## 2020-04-08 PROCEDURE — 84403 ASSAY OF TOTAL TESTOSTERONE: CPT | Performed by: INTERNAL MEDICINE

## 2020-04-08 RX ORDER — ZOLEDRONIC ACID 0.04 MG/ML
4 INJECTION, SOLUTION INTRAVENOUS ONCE
Status: CANCELLED | OUTPATIENT
Start: 2020-10-05

## 2020-04-08 RX ORDER — HEPARIN SODIUM (PORCINE) LOCK FLUSH IV SOLN 100 UNIT/ML 100 UNIT/ML
5 SOLUTION INTRAVENOUS
Status: CANCELLED | OUTPATIENT
Start: 2020-04-08

## 2020-04-08 RX ORDER — HEPARIN SODIUM (PORCINE) LOCK FLUSH IV SOLN 100 UNIT/ML 100 UNIT/ML
5 SOLUTION INTRAVENOUS
Status: DISCONTINUED | OUTPATIENT
Start: 2020-04-08 | End: 2020-04-08 | Stop reason: HOSPADM

## 2020-04-08 RX ORDER — HEPARIN SODIUM (PORCINE) LOCK FLUSH IV SOLN 100 UNIT/ML 100 UNIT/ML
5 SOLUTION INTRAVENOUS
Status: CANCELLED | OUTPATIENT
Start: 2020-10-05

## 2020-04-08 RX ORDER — HEPARIN SODIUM (PORCINE) LOCK FLUSH IV SOLN 100 UNIT/ML 100 UNIT/ML
5 SOLUTION INTRAVENOUS ONCE
Status: COMPLETED | OUTPATIENT
Start: 2020-04-08 | End: 2020-04-08

## 2020-04-08 RX ORDER — UBIDECARENONE 100 MG
100 CAPSULE ORAL DAILY
COMMUNITY
End: 2021-07-28

## 2020-04-08 RX ORDER — HEPARIN SODIUM,PORCINE 10 UNIT/ML
5 VIAL (ML) INTRAVENOUS
Status: DISCONTINUED | OUTPATIENT
Start: 2020-04-08 | End: 2020-04-08 | Stop reason: HOSPADM

## 2020-04-08 RX ORDER — HEPARIN SODIUM,PORCINE 10 UNIT/ML
5 VIAL (ML) INTRAVENOUS
Status: CANCELLED | OUTPATIENT
Start: 2020-10-05

## 2020-04-08 RX ORDER — HEPARIN SODIUM,PORCINE 10 UNIT/ML
5 VIAL (ML) INTRAVENOUS
Status: CANCELLED | OUTPATIENT
Start: 2020-04-08

## 2020-04-08 RX ORDER — ZOLEDRONIC ACID 0.04 MG/ML
4 INJECTION, SOLUTION INTRAVENOUS ONCE
Status: COMPLETED | OUTPATIENT
Start: 2020-04-08 | End: 2020-04-08

## 2020-04-08 RX ORDER — ZOLEDRONIC ACID 0.04 MG/ML
4 INJECTION, SOLUTION INTRAVENOUS ONCE
Status: CANCELLED | OUTPATIENT
Start: 2020-04-08

## 2020-04-08 RX ADMIN — ZOLEDRONIC ACID 4 MG: 0.04 INJECTION, SOLUTION INTRAVENOUS at 11:07

## 2020-04-08 RX ADMIN — Medication 5 ML: at 11:49

## 2020-04-08 RX ADMIN — SODIUM CHLORIDE 250 ML: 9 INJECTION, SOLUTION INTRAVENOUS at 11:07

## 2020-04-08 RX ADMIN — LEUPROLIDE ACETATE 22.5 MG: KIT at 10:53

## 2020-04-08 RX ADMIN — Medication 5 ML: at 09:19

## 2020-04-08 ASSESSMENT — MIFFLIN-ST. JEOR: SCORE: 2013.9

## 2020-04-08 ASSESSMENT — PAIN SCALES - GENERAL: PAINLEVEL: NO PAIN (0)

## 2020-04-08 NOTE — PROGRESS NOTES
Infusion Nursing Note:  Walter Reyes presents today for Zometa and Lupron.    Patient seen by provider today: Yes: Dr. Smalls   present during visit today: Not Applicable.    Note: Patient reports feeling well today.    Lupron administered IM in Right Ventrogluteal by LISSETTE Hebert.    Patient monitored x 20 minutes after Zometa.    Intravenous Access:  Implanted Port.    Treatment Conditions:  Lab Results   Component Value Date    HGB 13.3 04/08/2020     Lab Results   Component Value Date    WBC 10.4 04/08/2020      Lab Results   Component Value Date    ANEU 7.7 04/08/2020     Lab Results   Component Value Date     04/08/2020      Lab Results   Component Value Date     04/08/2020                   Lab Results   Component Value Date    POTASSIUM 4.0 04/08/2020           No results found for: MAG         Lab Results   Component Value Date    CR 1.08 04/08/2020                   Lab Results   Component Value Date    BETHANY 9.4 04/08/2020                Lab Results   Component Value Date    BILITOTAL 0.5 04/08/2020           Lab Results   Component Value Date    ALBUMIN 4.0 04/08/2020                    Lab Results   Component Value Date    ALT 25 04/08/2020           Lab Results   Component Value Date    AST 16 04/08/2020       Results reviewed, labs MET treatment parameters, ok to proceed with treatment.      Post Infusion Assessment:  Patient tolerated infusion without incident.  Patient tolerated injection without incident.  Access discontinued per protocol.       Discharge Plan:   Patient declined prescription refills.  Discharge instructions reviewed with: Patient.  Copy of AVS reviewed with patient and/or family.  Patient will return on June 30th or July 16th for next appointment. Inbasket sent to MD and Coordinator - patient will be on cruise when next appointment is due.  Patient discharged in stable condition accompanied by: self.  Departure Mode: Ambulatory.    Donna Jewell  RN    Administrations This Visit     0.9% sodium chloride BOLUS     Admin Date  04/08/2020 Action  New Bag Dose  250 mL Route  Intravenous Administered By  Donna Jewell RN          leuprolide (LUPRON DEPOT) kit 22.5 mg     Admin Date  04/08/2020 Action  Given Dose  22.5 mg Route  Intramuscular Administered By  Emilee Hargrove MA          zoledronic acid (ZOMETA) intermittent infusion 4 mg     Admin Date  04/08/2020 Action  New Bag Dose  4 mg Rate  400 mL/hr Route  Intravenous Administered By  Donna Jewell, RN

## 2020-04-08 NOTE — LETTER
"4/8/2020       RE: Walter Reyes  [id number] [facility] [address]     Dear Colleague,    Thank you for referring your patient, Walter Reyes, to the [facility]. Please see a copy of my visit note below.    MEDICAL ONCOLOGY FOLLOW-UP NOTE    REFERRING PROVIDER: Jorge Alberto Yepez MD at [facility].    REASON FOR VISIT: Follow-up for metastatic hormone-sensitive prostate cancer, currently on ADT alone.    HISTORY OF PRESENT ILLNESS: Mr. 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 His oncologic history is detailed below.     Walter reports feeling well overall. Energy level is getting better and he's recovered fully from hair loss etc. Does have bothersome hot flashes and drenching sweats at times. Libido is non-existent. Gained some weight over the holidays but now stable. Had recurrence of grade 1-2 PSN in hands/feet after a 21-mile hike down the [place] in Dec 2019 - now resolving.     No signs/symptoms of disease progression including hematuria etc.     ONCOLOGIC HISTORY:  1. 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 Naylor at [facility]. Prior PSA were 1.4 on 8/16/17, 2.4 on 6/28/16, 2.9 on 6/28/16, and as low as 0.4 on 3/26/2003.   - 1/08/2019: Consultation with Sarah Wilson CNP in Urology clinic. Repeat PSA 9.6.  - 1/16/2019: MRI prostate with contrast - \"This examination is characterized as PIRADS 5- very high probability. Clinically significant cancer is highly likely to be present. There is a large, invasive mass arising from the right peripheral zone and extending into the neurovascular bundle, seminal " "vesicle, and along the anterolateral right mesorectal fascia. Metastatic right external iliac lymph node. Metastatic lesion in the posterior/superior right acetabulum.\"  - 1/25/2019: CT abdomen and pelvis with IV contrast - \"1. Heterogeneous enhancing mass posteriorly in the upper pole of the right kidney measures 4.5 x 5.8 x 5.7 cm (AP by transverse by craniocaudal). It has a small nodular component extending posterior medially which abuts the right psoas muscle. This nodular extension measures 2.1 x 2.1 cm. Minimal stranding about the mass. No definite thrombus within the right renal vein. This renal mass is compatible with a renal cell carcinoma. A paraaortic lymph node situated immediately posterior to the left renal vein measures 1.1 cm in short axis, suspicious for metastasis. 2. A 2 cm right external iliac lymph node is also suspicious for metastases. 3. Multiple sclerotic osseous lesions suspicious for metastases. These include 0.8 and 0.6 cm sclerotic lesions laterally in the right iliac wing (images 53 and 62 respectively). Ill-defined groundglass density laterally in the right acetabulum measuring 1.7 x 1.2 cm corresponds with the lesion identified on MRI. A 0.4 cm groundglass density in the left acetabulum. Sclerotic lesion in the left femoral neck measures 0.9 x 1.6 cm (image 81). Sclerotic metastases would be more compatible with prostate metastases. 4. A 3 subcentimeter hepatic lesions are indeterminate. Metastases would be a consideration. These can be further characterized with liver MRI.\"  - 1/25/2019: NM bone scan - \"There is focal bony uptake in the left femoral neck, right acetabulum, right of midline at the S1 or L5 level of the spine, within multiple bilateral ribs, in the C7 or T1 level of the spine, and anteriorly within the skull. Findings are suspicious for metastases.\"  - 1/31/19: CT chest with contrast - \" No suspicious nodules in the chest.  Stable appearance of a 5.8 cm right renal " "mass concerning for renal carcinoma until proven otherwise.  Stable indeterminant subcentimeter hypodensities in the liver.  Multifocal osteoblastic metastasis including 2.5 cm lesion in the right fifth rib posteriorly and a 1.6 cm lesion in the right third rib posteriorly. No lytic lesions identified.\"  - 2/6/19: CT-guided right sclerotic fifth rib lesion biopsy - \"Metastatic carcinoma, consistent with prostate primary.  Immunohistochemical stains performed show the metastatic carcinoma stains positive for NKX3.1 (prostate marker), negative for STACIE 3 and PAX8, which supports the above diagnosis.\"  - 2/15/19: Started Casodex 50mg every day and consented for the biobanking protocol. 3/18/19 - stopped Casodex.  - 2/19/19: Case discussed in tumor board - recommendation for nephectomy for suspected malignant right renal mass.   - 2/26/19: Started Lupron 22.5mg every 3 months.   - 5/8/19: Started Docetaxel 75mg/m2 IV every 3 weeks. 06/18/19 - cycle 3, 7/10/19 - cycle 4, 07/31/19 - cycle 5, 08/21/19 - cycle 6.   - 10/2/19: Restaging CT CAP and NM Bone Scan showed improved osseous disease, no evidence of recurrent or new metastatic disease.  - 1/5/20: Restaging CT CAP and NM Bone Scan showed stable osseous disease, no evidence of recurrent or new metastatic disease.  - 4/6/20: NM bone scan with slightly improved uptake in known skeletal mets. CT C/A/P with contrast with stable osseous mets, no yusuf enlargement, no new visceral mets etc.  - 04/08/20: Zometa every 3 months.     2. Stage III ([id number]), grade 3 of 4, clear-cell RCC of right kidney:  - Incidentally diagnosed as above.   - Underwent curative-intent robotic right radical nephrectomy with Dr. Wesley Cleveland on 3/19/19. No tumor spillage per op report.   - Path showed: \"Histologic Type: Clear cell renal cell carcinoma; Sarcomatoid Features: Not identified; Histologic Grade: Nucleolar grade 3 (WHO/ISUP). Extent Tumor Size: 4.3 cm. Microscopic Tumor Extension: " "Tumor extension into renal sinus (in vascular structures). Margins: Negative. Tumor Necrosis: Present; focal. Lymph-Vascular Invasion: Not identified.  Pathologic Staging (pTNM) Primary Tumor (pT): pT3a: Tumor extends into renal vein branches/renal sinus. Regional Lymph Nodes (pN): pNX. Number of Lymph Nodes Examined: 0 Distant Metastasis (pM): pM N/A.\"  - Restaging scans as above.    PAST MEDICAL HISTORY:  Past Medical History:   Diagnosis Date     Cancer of kidney, right (H)      Complication of anesthesia     slow wakeup      Malignant neoplasm of prostate metastatic to bone (H) 2/14/2019     Thyroid disease      PAST SURGICAL HISTORY:   Past Surgical History:   Procedure Laterality Date     CYSTOSCOPY      about 10 years ago     INSERT PORT VASCULAR ACCESS Right 5/2/2019    Procedure: Chest Port Placement;  Surgeon: Tate Burciaga PA-C;  Location: [facility] OR     IR CHEST PORT PLACEMENT > 5 YRS OF AGE  5/2/2019     LAPAROSCOPIC NEPHRECTOMY Right 3/19/2019    Procedure: Right laparoscopic radical nephrectomy;  Surgeon: Wesley Cleveland MD;  Location:  OR      SOCIAL HISTORY:   Social History     Tobacco Use     Smoking status: Never Smoker     Smokeless tobacco: Never Used   Substance Use Topics     Alcohol use: None     Comment: wine - very rare     Drug use: No     FAMILY HISTORY:   Family History   Problem Relation Age of Onset     Diabetes Father      ALLERGIES:   Allergies   Allergen Reactions     Doxycycline GI Disturbance     CURRENT MEDICATIONS:   Current Outpatient Medications:      amLODIPine (NORVASC) 10 MG tablet, Take 1 tablet (10 mg) by mouth daily, Disp: 30 tablet, Rfl: 0     atorvastatin (LIPITOR) 20 MG tablet, Take 20 mg by mouth daily, Disp: , Rfl:      calcium citrate-vitamin D (CITRACAL) 315-250 MG-UNIT TABS per tablet, Take 650 mg by mouth 2 times daily , Disp: , Rfl:      cetirizine (ZYRTEC) 10 MG tablet, Take 10 mg by mouth as needed, Disp: , Rfl:      cinnamon 500 MG CAPS, " "Take 500 mg by mouth daily , Disp: , Rfl:      co-enzyme Q-10 100 MG CAPS capsule, Take by mouth daily, Disp: , Rfl:      cycloSPORINE (RESTASIS) 0.05 % ophthalmic emulsion, Place 1 drop into both eyes 2 times daily , Disp: , Rfl:      fluticasone (FLONASE) 50 MCG/ACT nasal spray, Spray 2 sprays in nostril daily as needed , Disp: , Rfl:      Glucosamine-Chondroit-Vit C-Mn (GLUCOSAMINE 1500 COMPLEX) CAPS, Take 1 capsule by mouth daily, Disp: , Rfl:      levothyroxine (SYNTHROID/LEVOTHROID) 125 MCG tablet, Take 100 mcg by mouth daily , Disp: , Rfl:      Multiple Vitamins-Minerals (MULTIVITAMIN ADULT EXTRA C PO), Take 1 tablet by mouth every 24 hours, Disp: , Rfl:      Nutritional Supplements (SALMON OIL) CAPS, Take 1 capsule by mouth daily, Disp: , Rfl:      omeprazole (PRILOSEC) 20 MG DR capsule, Take 20 mg by mouth daily, Disp: , Rfl:      prochlorperazine (COMPAZINE) 10 MG tablet, Take 1 tablet (10 mg) by mouth every 6 hours as needed (Nausea/Vomiting) (Patient not taking: Reported on 4/8/2020), Disp: 30 tablet, Rfl: 5  No current facility-administered medications for this visit.     Facility-Administered Medications Ordered in Other Visits:      heparin 100 UNIT/ML injection 5 mL, 5 mL, Intracatheter, Once PRN, Uday Smalls MD, 5 mL at 04/08/20 1149     heparin lock flush 10 UNIT/ML injection 5 mL, 5 mL, Intracatheter, Q1H PRN, Uday Smalls MD     sodium chloride (PF) 0.9% PF flush 3-20 mL, 3-20 mL, Intracatheter, Q1H PRN, Uday Smalls MD    PHYSICAL EXAMINATION:  VITALS: BP (!) 129/92 (BP Location: Right arm, Patient Position: Sitting, Cuff Size: Adult Large)   Pulse 71   Temp 97.8  F (36.6  C) (Tympanic)   Resp 16   Ht 1.829 m (6' 0.01\")   Wt 115.6 kg (254 lb 12.8 oz)   SpO2 97%   BMI 34.55 kg/m     ECOG PS 0.  GENERAL: The patient is a pleasant male in no acute distress.  HEENT: EOMI. Sclerae are anicteric. Oral mucosa is pink and moist.   Lymph: Neck is supple with no lymphadenopathy in the cervical or " "supraclavicular areas.   Heart: Regular rate and rhythm.   Lungs: Clear to auscultation bilaterally. Normal respiratory effort.  Abdomen: Bowel sounds present, soft, nontender with no palpable hepatosplenomegaly or masses.   Extremities: No lower extremity edema noted bilaterally.  Neuro: Alert and fully oriented, no focal deficits.  Skin: No rashes, petechiae, or bruising noted on exposed skin.    LABORATORY DATA:   Lab Test 04/08/20  0930 01/06/20  0858 10/02/19  0955   WBC 10.4 4.2 5.1   RBC 4.41 4.91 4.46   HGB 13.3 13.9 13.2*   HCT 39.5* 42.8 40.3   MCV 90 87 90   MCH 30.2 28.3 29.6   MCHC 33.7 32.5 32.8   RDW 13.0 14.1 13.5    203 186   NEUTROPHIL 73.9 52.4 49.8    141 142   POTASSIUM 4.0 4.1 4.0   CHLORIDE 107 108 108   CO2 28 28 27   ANIONGAP 5 5 7   * 103* 84   BUN 20 21 26   CR 1.08 1.13 1.08   BETHANY 9.4 9.4 8.9   PROTTOTAL 7.6 7.2 6.7*   ALBUMIN 4.0 4.0 3.6   BILITOTAL 0.5 0.4 0.4   ALKPHOS 75 65 55   AST 16 11 16   ALT 25 30 21     Lab Test 04/08/20  0930 01/06/20  0858 10/02/19  0955 08/21/19  1436 07/31/19  1225 07/10/19  1332   PSA 0.41 0.44 0.58 0.71 0.95 0.82   CGAB  --  470* 736* 597* 509* 646*   TESTOSTTOTAL  --  16* 16* <2* 2* 3*   CGAB - Chromogranin A; TESTOSTTOTAL: Total testosterone.    Lab Test 08/21/19  1436 07/31/19  1225 07/10/19  1332 06/18/19  1005 05/29/19  1217    219 218 220 232*     IMAGING:  As above.    ASSESSMENT & PLAN: Mr. Reyes is a delightful 60 year old gentleman with recently diagnosed metastatic castration sensitive prostate adenocarcinoma as well as an incidentally diagnosed stage III clear-cell renal cell carcinoma of the right kidney (s/p radical R nephrectomy 3/19/19), who is here for a follow-up visit after completion of docetaxel for prostate cancer.     1. Metastatic prostate cancer, with involvement of the bones and RPLN:   - Patient met the criteria for CHAARTED \"high-volume\" metastatic hormone-sensitive prostate cancer.   - After " a review of systemic therapy options, patient opted to start docetaxel in combination with ADT (per CHAARTED, [id number], STAMPEDE). He tolerated 6 cycles of docetaxel fairly well (5/8/19 through 8/21/19), with the exception of mild fatigue and mild neuropathy.  - Restaging CT CAP and NM Bone Scan from 4/6/20 shows essentially stable versus slightly improved disease. No evidence of recurrence or further metastatic disease.  - PSA continues a gradual down trend with some variability. No clinical evidence of progression. While his PSA is not <0.2 at 7 months, which was shown to predict a better OS in a post-hoc analysis of CHAARTED data (Bri et al, JCO 2018) he does have an intact prostate gland and hence PSA is exceedingly unlikely to become undetectable even if his cancer is very well controlled.  - Continue Lupron 22.5mg every 3 months - next dose today.   - He understands the risks and benefits of ADT including hot flashes, mood changes and long-term cardiovascular, bone health, etc.    2. Bone metastases:   - Noted to have osseous metastatic disease at the time of diagnosis, which has improved now following 6 cycles of docetaxel.  - Encouraged to continue calcium and vitamin D supplementation.  - Discussed role of bone anti-resorptive therapies again - given young age and high-volume [facility], will start Zometa 4mg IV every 3 months today. No contraindications (last dental procedure 2 mon ago) and pt advised on side effects including fever, bone pain, hypocalcemia, hypomagnesemia, ONJ, JESSICA, atypical fractures etc. Plan for 2 years total.      3. Stage 3, UISS-high risk ccRCC:  - Patient understands that he has a stage III, UISS high risk clear-cell renal cell carcinoma with approximately 40-50% risk of recurrence after definitive surgery.   - At this time, he has no clear evidence of residual disease. The retroperitoneal lymphadenopathy is more consistent with metastatic prostate cancer and is  "improving/resolved.   - Per the inbasket response from Dr. Cleveland on the bladder dome CT finding - \"Not too worried about this, but probably reasonable to have an office cystoscopy to take a peek at it.\" His team will schedule this.  - Continue active surveillance with self-reporting for signs/symptoms of recurrence (this was previously explained in detail) and periodic H&P and scans.      4. Genetics referral:  - Was referred and seen by Genetics on 3/7/19. No evidence of inherited cancer syndromes found on work-up.      5. Research participation:  - There were no available clinical trials for this gentleman at the time of diagnosis, especially given concurrent malignancy.   - Enrolled in the institutional biobanking study on 2/15/19.     Return to clinic in ~3 months with labs, scans and next doses of Lupron plus Zometa.    BILLIN.     Uday Smalls M.D.  [employer]. Professor of Medicine  Division of Hematology, Oncology & Transplantation  [facility]       "

## 2020-04-08 NOTE — PATIENT INSTRUCTIONS
[facility] Triage and after hours / weekends / holidays:  [phone]    Please call the triage or after hours line if you experience a temperature greater than or equal to 100.5, shaking chills, have uncontrolled nausea, vomiting and/or diarrhea, dizziness, shortness of breath, chest pain, bleeding, unexplained bruising, or if you have any other new/concerning symptoms, questions or concerns.      If you are having any concerning symptoms or wish to speak to a provider before your next infusion visit, please call your care coordinator or triage to notify them so we can adequately serve you.     If you need a refill on a narcotic prescription or other medication, please call before your infusion appointment.

## 2020-04-08 NOTE — PROGRESS NOTES
"MEDICAL ONCOLOGY FOLLOW-UP NOTE    REFERRING PROVIDER: Jorge Alberto Yepez MD at [facility].    REASON FOR VISIT: Follow-up for metastatic hormone-sensitive prostate cancer, currently on ADT alone.    HISTORY OF PRESENT ILLNESS: Mr. 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 His oncologic history is detailed below.     Walter reports feeling well overall. Energy level is getting better and he's recovered fully from hair loss etc. Does have bothersome hot flashes and drenching sweats at times. Libido is non-existent. Gained some weight over the holidays but now stable. Had recurrence of grade 1-2 PSN in hands/feet after [place] - now resolving.     No signs/symptoms of disease progression including hematuria etc.     ONCOLOGIC HISTORY:  1. 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 Naylor at [facility]. Prior PSA were 1.4 on 8/16/17, 2.4 on 6/28/16, 2.9 on 6/28/16, and as low as 0.4 on 3/26/2003.   - 1/08/2019: Consultation with Sarah Wilson CNP in Urology clinic. Repeat PSA 9.6.  - 1/16/2019: MRI prostate with contrast - \"This examination is characterized as PIRADS 5- very high probability. Clinically significant cancer is highly likely to be present. There is a large, invasive mass arising from the right peripheral zone and extending into the neurovascular bundle, seminal vesicle, and along the anterolateral right mesorectal fascia. Metastatic right external iliac lymph node. Metastatic lesion in the posterior/superior right acetabulum.\"  - 1/25/2019: CT abdomen and pelvis with IV contrast - \"1. Heterogeneous enhancing mass " "posteriorly in the upper pole of the right kidney measures 4.5 x 5.8 x 5.7 cm (AP by transverse by craniocaudal). It has a small nodular component extending posterior medially which abuts the right psoas muscle. This nodular extension measures 2.1 x 2.1 cm. Minimal stranding about the mass. No definite thrombus within the right renal vein. This renal mass is compatible with a renal cell carcinoma. A paraaortic lymph node situated immediately posterior to the left renal vein measures 1.1 cm in short axis, suspicious for metastasis. 2. A 2 cm right external iliac lymph node is also suspicious for metastases. 3. Multiple sclerotic osseous lesions suspicious for metastases. These include 0.8 and 0.6 cm sclerotic lesions laterally in the right iliac wing (images 53 and 62 respectively). Ill-defined groundglass density laterally in the right acetabulum measuring 1.7 x 1.2 cm corresponds with the lesion identified on MRI. A 0.4 cm groundglass density in the left acetabulum. Sclerotic lesion in the left femoral neck measures 0.9 x 1.6 cm (image 81). Sclerotic metastases would be more compatible with prostate metastases. 4. A 3 subcentimeter hepatic lesions are indeterminate. Metastases would be a consideration. These can be further characterized with liver MRI.\"  - 1/25/2019: NM bone scan - \"There is focal bony uptake in the left femoral neck, right acetabulum, right of midline at the S1 or L5 level of the spine, within multiple bilateral ribs, in the C7 or T1 level of the spine, and anteriorly within the skull. Findings are suspicious for metastases.\"  - 1/31/19: CT chest with contrast - \" No suspicious nodules in the chest.  Stable appearance of a 5.8 cm right renal mass concerning for renal carcinoma until proven otherwise.  Stable indeterminant subcentimeter hypodensities in the liver.  Multifocal osteoblastic metastasis including 2.5 cm lesion in the right fifth rib posteriorly and a 1.6 cm lesion in the right third " "rib posteriorly. No lytic lesions identified.\"  - 2/6/19: CT-guided right sclerotic fifth rib lesion biopsy - \"Metastatic carcinoma, consistent with prostate primary.  Immunohistochemical stains performed show the metastatic carcinoma stains positive for NKX3.1 (prostate marker), negative for STACIE 3 and PAX8, which supports the above diagnosis.\"  - 2/15/19: Started Casodex 50mg every day and consented for the biobanking protocol. 3/18/19 - stopped Casodex.  - 2/19/19: Case discussed in tumor board - recommendation for nephectomy for suspected malignant right renal mass.   - 2/26/19: Started Lupron 22.5mg every 3 months.   - 5/8/19: Started Docetaxel 75mg/m2 IV every 3 weeks. 06/18/19 - cycle 3, 7/10/19 - cycle 4, 07/31/19 - cycle 5, 08/21/19 - cycle 6.   - 10/2/19: Restaging CT CAP and NM Bone Scan showed improved osseous disease, no evidence of recurrent or new metastatic disease.  - 1/5/20: Restaging CT CAP and NM Bone Scan showed stable osseous disease, no evidence of recurrent or new metastatic disease.  - 4/6/20: NM bone scan with slightly improved uptake in known skeletal mets. CT C/A/P with contrast with stable osseous mets, no yusuf enlargement, no new visceral mets etc.  - 04/08/20: Zometa every 3 months.     2. Stage III ([id number]), grade 3 of 4, clear-cell RCC of right kidney:  - Incidentally diagnosed as above.   - Underwent curative-intent robotic right radical nephrectomy with Dr. Wesley Cleveland on 3/19/19. No tumor spillage per op report.   - Path showed: \"Histologic Type: Clear cell renal cell carcinoma; Sarcomatoid Features: Not identified; Histologic Grade: Nucleolar grade 3 (WHO/ISUP). Extent Tumor Size: 4.3 cm. Microscopic Tumor Extension: Tumor extension into renal sinus (in vascular structures). Margins: Negative. Tumor Necrosis: Present; focal. Lymph-Vascular Invasion: Not identified.  Pathologic Staging (pTNM) Primary Tumor (pT): pT3a: Tumor extends into renal vein branches/renal sinus. " "Regional Lymph Nodes (pN): pNX. Number of Lymph Nodes Examined: 0 Distant Metastasis (pM): pM N/A.\"  - Restaging scans as above.    PAST MEDICAL HISTORY:  Past Medical History:   Diagnosis Date     Cancer of kidney, right (H)      Complication of anesthesia     slow wakeup      Malignant neoplasm of prostate metastatic to bone (H) 2/14/2019     Thyroid disease      PAST SURGICAL HISTORY:   Past Surgical History:   Procedure Laterality Date     CYSTOSCOPY      about 10 years ago     INSERT PORT VASCULAR ACCESS Right 5/2/2019    Procedure: Chest Port Placement;  Surgeon: Tate Burciaga PA-C;  Location: [facility] OR     IR CHEST PORT PLACEMENT > 5 YRS OF AGE  5/2/2019     LAPAROSCOPIC NEPHRECTOMY Right 3/19/2019    Procedure: Right laparoscopic radical nephrectomy;  Surgeon: Wesley Cleveland MD;  Location: [facility] OR      SOCIAL HISTORY:   Social History     Tobacco Use     Smoking status: Never Smoker     Smokeless tobacco: Never Used   Substance Use Topics     Alcohol use: None     Comment: wine - very rare     Drug use: No     FAMILY HISTORY:   Family History   Problem Relation Age of Onset     Diabetes Father      ALLERGIES:   Allergies   Allergen Reactions     Doxycycline GI Disturbance     CURRENT MEDICATIONS:   Current Outpatient Medications:      amLODIPine (NORVASC) 10 MG tablet, Take 1 tablet (10 mg) by mouth daily, Disp: 30 tablet, Rfl: 0     atorvastatin (LIPITOR) 20 MG tablet, Take 20 mg by mouth daily, Disp: , Rfl:      calcium citrate-vitamin D (CITRACAL) 315-250 MG-UNIT TABS per tablet, Take 650 mg by mouth 2 times daily , Disp: , Rfl:      cetirizine (ZYRTEC) 10 MG tablet, Take 10 mg by mouth as needed, Disp: , Rfl:      cinnamon 500 MG CAPS, Take 500 mg by mouth daily , Disp: , Rfl:      co-enzyme Q-10 100 MG CAPS capsule, Take by mouth daily, Disp: , Rfl:      cycloSPORINE (RESTASIS) 0.05 % ophthalmic emulsion, Place 1 drop into both eyes 2 times daily , Disp: , Rfl:      fluticasone (FLONASE) " "50 MCG/ACT nasal spray, Spray 2 sprays in nostril daily as needed , Disp: , Rfl:      Glucosamine-Chondroit-Vit C-Mn (GLUCOSAMINE 1500 COMPLEX) CAPS, Take 1 capsule by mouth daily, Disp: , Rfl:      levothyroxine (SYNTHROID/LEVOTHROID) 125 MCG tablet, Take 100 mcg by mouth daily , Disp: , Rfl:      Multiple Vitamins-Minerals (MULTIVITAMIN ADULT EXTRA C PO), Take 1 tablet by mouth every 24 hours, Disp: , Rfl:      Nutritional Supplements (SALMON OIL) CAPS, Take 1 capsule by mouth daily, Disp: , Rfl:      omeprazole (PRILOSEC) 20 MG DR capsule, Take 20 mg by mouth daily, Disp: , Rfl:      prochlorperazine (COMPAZINE) 10 MG tablet, Take 1 tablet (10 mg) by mouth every 6 hours as needed (Nausea/Vomiting) (Patient not taking: Reported on 4/8/2020), Disp: 30 tablet, Rfl: 5  No current facility-administered medications for this visit.     Facility-Administered Medications Ordered in Other Visits:      heparin 100 UNIT/ML injection 5 mL, 5 mL, Intracatheter, Once PRN, Uday Smalls MD, 5 mL at 04/08/20 1149     heparin lock flush 10 UNIT/ML injection 5 mL, 5 mL, Intracatheter, Q1H PRN, Uday Smalls MD     sodium chloride (PF) 0.9% PF flush 3-20 mL, 3-20 mL, Intracatheter, Q1H PRN, Uday Smalls MD    PHYSICAL EXAMINATION:  VITALS: BP (!) 129/92 (BP Location: Right arm, Patient Position: Sitting, Cuff Size: Adult Large)   Pulse 71   Temp 97.8  F (36.6  C) (Tympanic)   Resp 16   Ht 1.829 m (6' 0.01\")   Wt 115.6 kg (254 lb 12.8 oz)   SpO2 97%   BMI 34.55 kg/m     ECOG PS 0.  GENERAL: The patient is a pleasant male in no acute distress.  HEENT: EOMI. Sclerae are anicteric. Oral mucosa is pink and moist.   Lymph: Neck is supple with no lymphadenopathy in the cervical or supraclavicular areas.   Heart: Regular rate and rhythm.   Lungs: Clear to auscultation bilaterally. Normal respiratory effort.  Abdomen: Bowel sounds present, soft, nontender with no palpable hepatosplenomegaly or masses.   Extremities: No lower extremity " "edema noted bilaterally.  Neuro: Alert and fully oriented, no focal deficits.  Skin: No rashes, petechiae, or bruising noted on exposed skin.    LABORATORY DATA:   Lab Test 04/08/20  0930 01/06/20  0858 10/02/19  0955   WBC 10.4 4.2 5.1   RBC 4.41 4.91 4.46   HGB 13.3 13.9 13.2*   HCT 39.5* 42.8 40.3   MCV 90 87 90   MCH 30.2 28.3 29.6   MCHC 33.7 32.5 32.8   RDW 13.0 14.1 13.5    203 186   NEUTROPHIL 73.9 52.4 49.8    141 142   POTASSIUM 4.0 4.1 4.0   CHLORIDE 107 108 108   CO2 28 28 27   ANIONGAP 5 5 7   * 103* 84   BUN 20 21 26   CR 1.08 1.13 1.08   BETHANY 9.4 9.4 8.9   PROTTOTAL 7.6 7.2 6.7*   ALBUMIN 4.0 4.0 3.6   BILITOTAL 0.5 0.4 0.4   ALKPHOS 75 65 55   AST 16 11 16   ALT 25 30 21     Lab Test 04/08/20  0930 01/06/20  0858 10/02/19  0955 08/21/19  1436 07/31/19  1225 07/10/19  1332   PSA 0.41 0.44 0.58 0.71 0.95 0.82   CGAB  --  470* 736* 597* 509* 646*   TESTOSTTOTAL  --  16* 16* <2* 2* 3*   CGAB - Chromogranin A; TESTOSTTOTAL: Total testosterone.    Lab Test 08/21/19  1436 07/31/19  1225 07/10/19  1332 06/18/19  1005 05/29/19  1217    219 218 220 232*     IMAGING:  As above.    ASSESSMENT & PLAN: Mr. Reyes is a delightful 60 year old gentleman with recently diagnosed metastatic castration sensitive prostate adenocarcinoma as well as an incidentally diagnosed stage III clear-cell renal cell carcinoma of the right kidney (s/p radical R nephrectomy 3/19/19), who is here for a follow-up visit after completion of docetaxel for prostate cancer.     1. Metastatic prostate cancer, with involvement of the bones and RPLN:   - Patient met the criteria for CHAARTED \"high-volume\" metastatic hormone-sensitive prostate cancer.   - After a review of systemic therapy options, patient opted to start docetaxel in combination with ADT (per JOSEFA, [id number], KARLA). He tolerated 6 cycles of docetaxel fairly well (5/8/19 through 8/21/19), with the exception of mild fatigue and mild " "neuropathy.  - Restaging CT CAP and NM Bone Scan from 4/6/20 shows essentially stable versus slightly improved disease. No evidence of recurrence or further metastatic disease.  - PSA continues a gradual down trend with some variability. No clinical evidence of progression. While his PSA is not <0.2 at 7 months, which was shown to predict a better OS in a post-hoc analysis of CHAARTED data (Bri et al, JCO 2018) he does have an intact prostate gland and hence PSA is exceedingly unlikely to become undetectable even if his cancer is very well controlled.  - Continue Lupron 22.5mg every 3 months - next dose today.   - He understands the risks and benefits of ADT including hot flashes, mood changes and long-term cardiovascular, bone health, etc.    2. Bone metastases:   - Noted to have osseous metastatic disease at the time of diagnosis, which has improved now following 6 cycles of docetaxel.  - Encouraged to continue calcium and vitamin D supplementation.  - Discussed role of bone anti-resorptive therapies again - given young age and high-volume SPC, will start Zometa 4mg IV every 3 months today. No contraindications (last dental procedure 2 mon ago) and pt advised on side effects including fever, bone pain, hypocalcemia, hypomagnesemia, ONJ, JESSICA, atypical fractures etc. Plan for 2 years total.      3. Stage 3, UISS-high risk ccRCC:  - Patient understands that he has a stage III, UISS high risk clear-cell renal cell carcinoma with approximately 40-50% risk of recurrence after definitive surgery.   - At this time, he has no clear evidence of residual disease. The retroperitoneal lymphadenopathy is more consistent with metastatic prostate cancer and is improving/resolved.   - Per the inbasket response from Dr. Cleveland on the bladder dome CT finding - \"Not too worried about this, but probably reasonable to have an office cystoscopy to take a peek at it.\" His team will schedule this.  - Continue active surveillance " with self-reporting for signs/symptoms of recurrence (this was previously explained in detail) and periodic H&P and scans.      4. Genetics referral:  - Was referred and seen by Genetics on 3/7/19. No evidence of inherited cancer syndromes found on work-up.      5. Research participation:  - There were no available clinical trials for this gentleman at the time of diagnosis, especially given concurrent malignancy.   - Enrolled in the institutional biobanking study on 2/15/19.     Return to clinic in ~3 months with labs, scans and next doses of Lupron plus Zometa.    BILLIN.     Uday Smalls M.D.  [employer]. Professor of Medicine  Division of Hematology, Oncology & Transplantation  [facility]

## 2020-04-08 NOTE — NURSING NOTE
Chief Complaint   Patient presents with     Oncology Clinic Visit     Return; Renal Cell Carcinoma, Malignant Neoplasm of Prostate Metastatic to Bone     Port Draw     Labs drawn via port by RN in lab. VS taken. Patient checked in for next appt.     Port accessed with 20 gauge gripper needle by RN, labs collected, line flushed with saline and heparin.  Vitals taken. Pt checked in for appointment.    Robina Ly RN

## 2020-04-08 NOTE — NURSING NOTE
"Oncology Rooming Note    April 8, 2020 9:40 AM   Walter Reyes is a 60 year old male who presents for:    Chief Complaint   Patient presents with     Oncology Clinic Visit     Return; Renal Cell Carcinoma, Malignant Neoplasm of Prostate Metastatic to Bone     Initial Vitals: BP (!) 129/92 (BP Location: Right arm, Patient Position: Sitting, Cuff Size: Adult Large)   Pulse 71   Temp 97.8  F (36.6  C) (Tympanic)   Resp 16   Ht 1.829 m (6' 0.01\")   Wt 115.6 kg (254 lb 12.8 oz)   SpO2 97%   BMI 34.55 kg/m   Estimated body mass index is 34.55 kg/m  as calculated from the following:    Height as of this encounter: 1.829 m (6' 0.01\").    Weight as of this encounter: 115.6 kg (254 lb 12.8 oz). Body surface area is 2.42 meters squared.  No Pain (0) Comment: Data Unavailable   No LMP for male patient.  Allergies reviewed: Yes  Medications reviewed: Yes    Medications: Medication refills not needed today.  Pharmacy name entered into EPIC:    [facility] - [place], [place] - [address] [id number] - [place], [place] - [address]    Clinical concerns: No new concerns        Radha Salas CMA              "

## 2020-04-10 LAB
CGA SERPL-MCNC: 852 NG/ML (ref 0–160)
TESTOST SERPL-MCNC: 8 NG/DL (ref 240–950)

## 2020-07-10 ENCOUNTER — HOSPITAL ENCOUNTER (OUTPATIENT)
Dept: CT IMAGING | Facility: CLINIC | Age: 60
Discharge: HOME | End: 2020-07-10
Attending: INTERNAL MEDICINE | Admitting: INTERNAL MEDICINE
Payer: COMMERCIAL

## 2020-07-10 ENCOUNTER — HOSPITAL ENCOUNTER (OUTPATIENT)
Facility: CLINIC | Age: 60
Setting detail: OBSERVATION
Discharge: HOME | End: 2020-07-11
Attending: NURSE PRACTITIONER | Admitting: INTERNAL MEDICINE
Payer: COMMERCIAL

## 2020-07-10 ENCOUNTER — HOSPITAL ENCOUNTER (OUTPATIENT)
Dept: NUCLEAR MEDICINE | Facility: CLINIC | Age: 60
Setting detail: NUCLEAR MEDICINE
End: 2020-07-10
Attending: INTERNAL MEDICINE
Payer: COMMERCIAL

## 2020-07-10 ENCOUNTER — TELEPHONE (OUTPATIENT)
Dept: ONCOLOGY | Facility: CLINIC | Age: 60
End: 2020-07-10

## 2020-07-10 DIAGNOSIS — K57.92 DIVERTICULITIS: Primary | ICD-10-CM

## 2020-07-10 DIAGNOSIS — K57.32 DIVERTICULITIS OF COLON: ICD-10-CM

## 2020-07-10 DIAGNOSIS — C79.51 MALIGNANT NEOPLASM OF PROSTATE METASTATIC TO BONE (H): ICD-10-CM

## 2020-07-10 DIAGNOSIS — C61 MALIGNANT NEOPLASM OF PROSTATE METASTATIC TO BONE (H): ICD-10-CM

## 2020-07-10 LAB
ALBUMIN SERPL-MCNC: 3.8 G/DL (ref 3.4–5)
ALP SERPL-CCNC: 55 U/L (ref 40–150)
ALT SERPL W P-5'-P-CCNC: 21 U/L (ref 0–70)
ANION GAP SERPL CALCULATED.3IONS-SCNC: 6 MMOL/L (ref 3–14)
AST SERPL W P-5'-P-CCNC: 16 U/L (ref 0–45)
BILIRUB SERPL-MCNC: 0.6 MG/DL (ref 0.2–1.3)
BUN SERPL-MCNC: 16 MG/DL (ref 7–30)
CALCIUM SERPL-MCNC: 8.9 MG/DL (ref 8.5–10.1)
CHLORIDE SERPL-SCNC: 106 MMOL/L (ref 94–109)
CO2 SERPL-SCNC: 26 MMOL/L (ref 20–32)
CREAT SERPL-MCNC: 1.12 MG/DL (ref 0.66–1.25)
ERYTHROCYTE [DISTWIDTH] IN BLOOD BY AUTOMATED COUNT: 13.3 % (ref 10–15)
GFR SERPL CREATININE-BSD FRML MDRD: 72 ML/MIN/{1.73_M2}
GLUCOSE SERPL-MCNC: 94 MG/DL (ref 70–99)
HCT VFR BLD AUTO: 43.2 % (ref 40–53)
HGB BLD-MCNC: 13.7 G/DL (ref 13.3–17.7)
LACTATE BLD-SCNC: 0.6 MMOL/L (ref 0.7–2)
MCH RBC QN AUTO: 29.1 PG (ref 26.5–33)
MCHC RBC AUTO-ENTMCNC: 31.7 G/DL (ref 31.5–36.5)
MCV RBC AUTO: 92 FL (ref 78–100)
PLATELET # BLD AUTO: 177 10E9/L (ref 150–450)
POTASSIUM SERPL-SCNC: 4 MMOL/L (ref 3.4–5.3)
PROT SERPL-MCNC: 7.2 G/DL (ref 6.8–8.8)
RADIOLOGIST FLAGS: ABNORMAL
RBC # BLD AUTO: 4.7 10E12/L (ref 4.4–5.9)
SODIUM SERPL-SCNC: 138 MMOL/L (ref 133–144)
WBC # BLD AUTO: 8.1 10E9/L (ref 4–11)

## 2020-07-10 PROCEDURE — 85027 COMPLETE CBC AUTOMATED: CPT | Performed by: NURSE PRACTITIONER

## 2020-07-10 PROCEDURE — 96365 THER/PROPH/DIAG IV INF INIT: CPT

## 2020-07-10 PROCEDURE — 78306 BONE IMAGING WHOLE BODY: CPT

## 2020-07-10 PROCEDURE — 25000128 H RX IP 250 OP 636: Performed by: NURSE PRACTITIONER

## 2020-07-10 PROCEDURE — U0003 INFECTIOUS AGENT DETECTION BY NUCLEIC ACID (DNA OR RNA); SEVERE ACUTE RESPIRATORY SYNDROME CORONAVIRUS 2 (SARS-COV-2) (CORONAVIRUS DISEASE [COVID-19]), AMPLIFIED PROBE TECHNIQUE, MAKING USE OF HIGH THROUGHPUT TECHNOLOGIES AS DESCRIBED BY CMS-2020-01-R: HCPCS | Performed by: NURSE PRACTITIONER

## 2020-07-10 PROCEDURE — 25000132 ZZH RX MED GY IP 250 OP 250 PS 637: Performed by: PHYSICIAN ASSISTANT

## 2020-07-10 PROCEDURE — 34300033 ZZH RX 343: Performed by: INTERNAL MEDICINE

## 2020-07-10 PROCEDURE — A9503 TC99M MEDRONATE: HCPCS | Performed by: INTERNAL MEDICINE

## 2020-07-10 PROCEDURE — 25000132 ZZH RX MED GY IP 250 OP 250 PS 637: Performed by: INTERNAL MEDICINE

## 2020-07-10 PROCEDURE — G0378 HOSPITAL OBSERVATION PER HR: HCPCS

## 2020-07-10 PROCEDURE — 96368 THER/DIAG CONCURRENT INF: CPT

## 2020-07-10 PROCEDURE — C9803 HOPD COVID-19 SPEC COLLECT: HCPCS

## 2020-07-10 PROCEDURE — 96376 TX/PRO/DX INJ SAME DRUG ADON: CPT

## 2020-07-10 PROCEDURE — 74176 CT ABD & PELVIS W/O CONTRAST: CPT

## 2020-07-10 PROCEDURE — 83605 ASSAY OF LACTIC ACID: CPT | Performed by: NURSE PRACTITIONER

## 2020-07-10 PROCEDURE — 99220 ZZC INITIAL OBSERVATION CARE,LEVL III: CPT | Performed by: PHYSICIAN ASSISTANT

## 2020-07-10 PROCEDURE — 80053 COMPREHEN METABOLIC PANEL: CPT | Performed by: NURSE PRACTITIONER

## 2020-07-10 PROCEDURE — 99285 EMERGENCY DEPT VISIT HI MDM: CPT | Mod: 25

## 2020-07-10 PROCEDURE — 25000128 H RX IP 250 OP 636: Performed by: PHYSICIAN ASSISTANT

## 2020-07-10 RX ORDER — CIPROFLOXACIN 2 MG/ML
400 INJECTION, SOLUTION INTRAVENOUS ONCE
Status: COMPLETED | OUTPATIENT
Start: 2020-07-10 | End: 2020-07-10

## 2020-07-10 RX ORDER — ACETAMINOPHEN 325 MG/1
650 TABLET ORAL EVERY 4 HOURS PRN
Status: DISCONTINUED | OUTPATIENT
Start: 2020-07-10 | End: 2020-07-11 | Stop reason: HOSPADM

## 2020-07-10 RX ORDER — HEPARIN SODIUM (PORCINE) LOCK FLUSH IV SOLN 100 UNIT/ML 100 UNIT/ML
5 SOLUTION INTRAVENOUS
Status: DISCONTINUED | OUTPATIENT
Start: 2020-07-10 | End: 2020-07-11 | Stop reason: HOSPADM

## 2020-07-10 RX ORDER — LEVOTHYROXINE SODIUM 112 UG/1
112 TABLET ORAL DAILY
COMMUNITY

## 2020-07-10 RX ORDER — ATORVASTATIN CALCIUM 20 MG/1
20 TABLET, FILM COATED ORAL EVERY EVENING
Status: DISCONTINUED | OUTPATIENT
Start: 2020-07-10 | End: 2020-07-11 | Stop reason: HOSPADM

## 2020-07-10 RX ORDER — ONDANSETRON 2 MG/ML
4 INJECTION INTRAMUSCULAR; INTRAVENOUS EVERY 6 HOURS PRN
Status: DISCONTINUED | OUTPATIENT
Start: 2020-07-10 | End: 2020-07-11 | Stop reason: HOSPADM

## 2020-07-10 RX ORDER — ONDANSETRON 4 MG/1
4 TABLET, ORALLY DISINTEGRATING ORAL EVERY 6 HOURS PRN
Status: DISCONTINUED | OUTPATIENT
Start: 2020-07-10 | End: 2020-07-11 | Stop reason: HOSPADM

## 2020-07-10 RX ORDER — HEPARIN SODIUM,PORCINE 10 UNIT/ML
5-10 VIAL (ML) INTRAVENOUS EVERY 24 HOURS
Status: DISCONTINUED | OUTPATIENT
Start: 2020-07-10 | End: 2020-07-11 | Stop reason: HOSPADM

## 2020-07-10 RX ORDER — AMLODIPINE BESYLATE 5 MG/1
10 TABLET ORAL EVERY EVENING
Status: DISCONTINUED | OUTPATIENT
Start: 2020-07-10 | End: 2020-07-11 | Stop reason: HOSPADM

## 2020-07-10 RX ORDER — NALOXONE HYDROCHLORIDE 0.4 MG/ML
.1-.4 INJECTION, SOLUTION INTRAMUSCULAR; INTRAVENOUS; SUBCUTANEOUS
Status: DISCONTINUED | OUTPATIENT
Start: 2020-07-10 | End: 2020-07-11 | Stop reason: HOSPADM

## 2020-07-10 RX ORDER — ACETAMINOPHEN 650 MG/1
650 SUPPOSITORY RECTAL EVERY 4 HOURS PRN
Status: DISCONTINUED | OUTPATIENT
Start: 2020-07-10 | End: 2020-07-11 | Stop reason: HOSPADM

## 2020-07-10 RX ORDER — HEPARIN SODIUM,PORCINE 10 UNIT/ML
5-10 VIAL (ML) INTRAVENOUS
Status: DISCONTINUED | OUTPATIENT
Start: 2020-07-10 | End: 2020-07-11 | Stop reason: HOSPADM

## 2020-07-10 RX ORDER — AMOXICILLIN 250 MG
2 CAPSULE ORAL 2 TIMES DAILY PRN
Status: DISCONTINUED | OUTPATIENT
Start: 2020-07-10 | End: 2020-07-11 | Stop reason: HOSPADM

## 2020-07-10 RX ORDER — LIDOCAINE 40 MG/G
CREAM TOPICAL
Status: DISCONTINUED | OUTPATIENT
Start: 2020-07-10 | End: 2020-07-11 | Stop reason: HOSPADM

## 2020-07-10 RX ORDER — OXYCODONE HYDROCHLORIDE 5 MG/1
5-10 TABLET ORAL
Status: DISCONTINUED | OUTPATIENT
Start: 2020-07-10 | End: 2020-07-11 | Stop reason: HOSPADM

## 2020-07-10 RX ORDER — TC 99M MEDRONATE 20 MG/10ML
20-30 INJECTION, POWDER, LYOPHILIZED, FOR SOLUTION INTRAVENOUS ONCE
Status: COMPLETED | OUTPATIENT
Start: 2020-07-10 | End: 2020-07-10

## 2020-07-10 RX ORDER — LEVOTHYROXINE SODIUM 100 UG/1
100 TABLET ORAL DAILY
Status: DISCONTINUED | OUTPATIENT
Start: 2020-07-11 | End: 2020-07-11 | Stop reason: HOSPADM

## 2020-07-10 RX ORDER — CIPROFLOXACIN 2 MG/ML
400 INJECTION, SOLUTION INTRAVENOUS EVERY 12 HOURS
Status: DISCONTINUED | OUTPATIENT
Start: 2020-07-11 | End: 2020-07-11 | Stop reason: HOSPADM

## 2020-07-10 RX ORDER — POLYETHYLENE GLYCOL 3350 17 G/17G
17 POWDER, FOR SOLUTION ORAL DAILY PRN
Status: DISCONTINUED | OUTPATIENT
Start: 2020-07-10 | End: 2020-07-11 | Stop reason: HOSPADM

## 2020-07-10 RX ORDER — AMLODIPINE BESYLATE 5 MG/1
10 TABLET ORAL EVERY EVENING
Status: DISCONTINUED | OUTPATIENT
Start: 2020-07-11 | End: 2020-07-10

## 2020-07-10 RX ORDER — AMOXICILLIN 250 MG
1 CAPSULE ORAL 2 TIMES DAILY PRN
Status: DISCONTINUED | OUTPATIENT
Start: 2020-07-10 | End: 2020-07-11 | Stop reason: HOSPADM

## 2020-07-10 RX ADMIN — AMLODIPINE BESYLATE 10 MG: 5 TABLET ORAL at 20:49

## 2020-07-10 RX ADMIN — METRONIDAZOLE 500 MG: 500 INJECTION, SOLUTION INTRAVENOUS at 21:15

## 2020-07-10 RX ADMIN — Medication 5 ML: at 21:07

## 2020-07-10 RX ADMIN — ATORVASTATIN CALCIUM 20 MG: 20 TABLET, FILM COATED ORAL at 20:50

## 2020-07-10 RX ADMIN — METRONIDAZOLE 500 MG: 500 INJECTION, SOLUTION INTRAVENOUS at 14:54

## 2020-07-10 RX ADMIN — Medication 5 ML: at 22:53

## 2020-07-10 RX ADMIN — CIPROFLOXACIN 400 MG: 2 INJECTION, SOLUTION INTRAVENOUS at 14:54

## 2020-07-10 RX ADMIN — TC 99M MEDRONATE 25.6 MCI.: 20 INJECTION, POWDER, LYOPHILIZED, FOR SOLUTION INTRAVENOUS at 08:20

## 2020-07-10 ASSESSMENT — MIFFLIN-ST. JEOR
SCORE: 1960.69
SCORE: 1968

## 2020-07-10 ASSESSMENT — ENCOUNTER SYMPTOMS
SORE THROAT: 0
FEVER: 0
COUGH: 0
ABDOMINAL PAIN: 1
SHORTNESS OF BREATH: 0

## 2020-07-10 NOTE — LETTER
July 12, 2020        Walter Reyes  [id number]  [facility] [id number]    This letter provides a written record that you were tested for COVID-19 on 7/10/20.    Your result was negative. This means that we didn t find the virus that causes COVID-19 in your sample. A test may show negative when you do actually have the virus. This can happen when the virus is in the early stages of infection, before you feel illness symptoms.    If you have symptoms   Stay home and away from others (self-isolate) until you meet ALL of the guidelines below:    You ve had no fever--and no medicine that reduces fever--for 3 full days (72 hours). And      Your other symptoms have gotten better. For example, your cough or breathing has improved. And     At least 10 days have passed since your symptoms started.    During this time:    Stay home. Don t go to work, school or anywhere else.     Stay in your own room, including for meals. Use your own bathroom if you can.    Stay away from others in your home. No hugging, kissing or shaking hands. No visitors.    Clean  high touch  surfaces often (doorknobs, counters, handles, etc.). Use a household cleaning spray or wipes. You can find a full list on the EPA website at www.epa.gov/pesticide-registration/list-n-disinfectants-use-against-sars-cov-2.    Cover your mouth and nose with a mask, tissue or washcloth to avoid spreading germs.    Wash your hands and face often with soap and water.    Going back to work  Check with your employer for any guidelines to follow for going back to work.    Employers: This document serves as formal notice that your employee tested negative for COVID-19, as of the testing date shown above.

## 2020-07-10 NOTE — ED PROVIDER NOTES
History   Chief Complaint  Abdominal Pain    HPI   Walter Reyes is a 60 year old male with a history of prostate and right kidney cancer and diverticulosis who presents for evaluation of abdominal pain onset a copule days ago. Walter states that he has a history of stage 4 prostate cancer and had kidney cancer with nephrectomy in March of 2019. He states that he gets frequent CT scans of his abdomen because of this, and this is how he was diagnosed with diverticulosis. Waletr states that his pain is mostly on his left side and causes enough distress to disrupt his sleep. He denies any urinary symptoms or fever. He states his pain is worse when he stands. Patient was seen this morning for a CT scan. His results were not shared with him at his appointment, however these are included below.     Abdomen/Chest CT done today:  IMPRESSION:  In this patient with a history of prostate cancer and right renal cell  carcinoma status post right nephrectomy:  1. Acute diverticulitis, likely uncomplicated, of the sigmoid colon  with prominent inflammatory changes surrounding the colon and a tiny  amount of free air. No evidence for abscess although sensitivity for  abscess on this noncontrast CT as well.  2. No evidence of intrathoracic or intra-abdominal metastatic disease.  3. No significant change in the osseous metastatic disease. Recommend  follow-up with same day nuclear medicine bone scan.  4. Moderate to large hiatal hernia.    Allergies  Doxycycline    Medications  amlodipine  atorvastatin   calcium citrate-vitamin D   cetirizine   co-enzyme Q-10  fluticasone   Glucosamine-Chondroit-Vit C-Mn   levothyroxine   Multiple Vitamins-Minerals   Nutritional Supplements  omeprazole   prochlorperazine     Past Medical History  Right Kidney cancer  Prostate cancer  Thyroid disease    Past Surgical History  Cystoscopy  Insert prot vascular access, right  IR chest port placement  Laparoscopic nephrectomy, right    Family  History  Father - Diabetes    Social History  Tobacco use: Never smoker  Alcohol use: no  Drug use: no  Marital Status:     Review of Systems   Constitutional: Negative for fever.   HENT: Negative for sore throat.    Respiratory: Negative for cough and shortness of breath.    Gastrointestinal: Positive for abdominal pain.   Genitourinary: Negative.    All other systems reviewed and are negative.      Physical Exam     Patient Vitals for the past 24 hrs:   BP Temp Pulse Resp SpO2 Height Weight   07/10/20 1334 (!) 134/91 98  F (36.7  C) 73 18 96 % 1.829 m (6') 111 kg (244 lb 11.4 oz)     Physical Exam    General: Alert, No obvious discomfort, well kept  Eyes: PERRL, conjunctivae pink no scleral icterus or conjunctival injection  ENT:   Moist mucus membranes, posterior oropharynx clear without erythema or exudates, No lymphadenopathy, Normal voice  Resp:  Lungs clear to auscultation bilaterally, no crackles/rubs/wheezes. Good air movement  CV:  Normal rate and rhythm, no murmurs/rubs/gallops  GI:  Abdomen soft and non-distended.  Normoactive BS.  Left lower abd tenderness,  No guarding or rebound, Yes masses  Skin:  Warm, dry.  No rashes or petechiae  Musculoskeletal: No peripheral edema or calf tenderness, Normal gross ROM   Neuro: Alert and oriented to person/place/time, normal sensation  Psychiatric: Normal affect, cooperative, good eye contact      Emergency Department Course     Laboratory:  Laboratory findings were communicated with the patient who voiced understanding of the findings.    CMP: Glucose 94 o/w WNL (Creatinine: 1.12)  CBC: WBC: 8.1, HGB: 13.7, PLT: 177  Lactic acid (Resulted at 1509): 0.6 (low)    COVID-19 Virus: pending    Interventions:  1454 Cipro 400 mg IV  1454 Flagyl 500 mg IV    Emergency Department Course:  Past medical records, nursing notes, and vitals reviewed.    1:51 PM I physically examined the patient as documented above.    A Nasopharyngeal swab was obtained here in the  ED.  IV was inserted and blood was drawn for laboratory testing, results above.     Findings and plan explained to the Patient who consents to admission. Discussed the patient with Tiffanie Bean PA-C for Dr. Hein, Hospitalist, who will admit the patient to a observation bed for further monitoring, evaluation, and treatment.    I personally reviewed the laboratory results with the Patient and answered all related questions prior to admission.     Impression & Plan   Medical Decision Making:  The patient presented with abdominal pain. The differential diagnosis included but was not limited to appendicitis, diverticulitis, UTI, pyelonephritis, ovarian cyst/torsion, colitis.  CT was performed on a routine basis for patient's cancer monitoring.  This was done at the [facility]. CT confirms diverticulitis with likely microperforation.  Given patient's microperforation and evidence of diverticulitis on CT scan plan will be to start IV antibiotics and admit to the medical surgical observation unit for continued treatment and evaluation.  Consult with GI as needed.  I have spoken with Tiffanie Bean PA-C for Dr. Hein, hospitalist, who has accepted the patient for admission. The initial dose of antibiotics was ordered in the ED.       Diagnosis:    ICD-10-CM    1. Diverticulitis of colon  K57.32 Comprehensive metabolic panel     CBC (platelets, no diff)     Lactic acid whole blood       Disposition:  Admitted to observation.    Scribe Disclosure:  IVON, Murray Ascencio, am serving as a scribe at 1:51 PM on 7/10/2020 to document services personally performed by Dean Silva APRN based on my observations and the provider's statements to me.      Dean Silva APRN CNP  07/10/20 [id number]

## 2020-07-10 NOTE — PHARMACY-ADMISSION MEDICATION HISTORY
Admission medication history interview status for this patient is complete. See [facility] admission navigator for allergy information, prior to admission medications and immunization status.     Medication history interview done via telephone during Covid-19 pandemic, indicate source(s): Patient  Medication history resources (including written lists, pill bottles, clinic record):None    Changes made to PTA medication list:  Added: None  Deleted: cinnamon caps, Compazine tabs  Changed:   - Levothyroxine 125 mcg daily to 100 mcg daily  - [place] oil from 1 capsule daily to 2 caps daily  - Updated frequency of CoQ-10 to 1 capsule daily    Actions taken by pharmacist (provider contacted, etc):None     Additional medication history information:  - Patient confirmed taking only levothyroxine today (7/10).    Medication reconciliation/reorder completed by provider prior to medication history?  N   (Y/N)     For patients on insulin therapy: N  (Y/N)      Prior to Admission medications    Medication Sig Last Dose Taking? Auth Provider   amLODIPine (NORVASC) 10 MG tablet Take 1 tablet (10 mg) by mouth daily 7/9/2020 at PM Yes Uday Smalls MD   atorvastatin (LIPITOR) 20 MG tablet Take 20 mg by mouth daily 7/9/2020 at PM Yes Reported, Patient   calcium citrate-vitamin D (CITRACAL) 315-250 MG-UNIT TABS per tablet Take 650 mg by mouth 2 times daily  7/9/2020 at PM Yes Reported, Patient   co-enzyme Q-10 100 MG CAPS capsule Take 100 mg by mouth daily  7/9/2020 at PM Yes Reported, Patient   cycloSPORINE (RESTASIS) 0.05 % ophthalmic emulsion Place 1 drop into both eyes 2 times daily  7/10/2020 at AM Yes Reported, Patient   Glucosamine-Chondroit-Vit C-Mn (GLUCOSAMINE 1500 COMPLEX) CAPS Take 1 capsule by mouth daily 7/9/2020 at PM Yes Reported, Patient   levothyroxine (SYNTHROID/LEVOTHROID) 100 MCG tablet Take 100 mcg by mouth daily 7/10/2020 at AM Yes Unknown, Entered By History   Multiple Vitamins-Minerals (MULTIVITAMIN ADULT EXTRA C PO)  Take 1 tablet by mouth every 24 hours 7/9/2020 at PM Yes Reported, Patient   Nutritional Supplements (SALMON OIL) CAPS Take 2 capsules by mouth daily  7/9/2020 at PM Yes Reported, Patient   omeprazole (PRILOSEC) 20 MG DR capsule Take 20 mg by mouth daily Past Week at stopped taking a few days ago Yes Reported, Patient   cetirizine (ZYRTEC) 10 MG tablet Take 10 mg by mouth as needed for allergies  More than a month at PRN for seasonal allergy  Reported, Patient   fluticasone (FLONASE) 50 MCG/ACT nasal spray Spray 2 sprays in nostril daily as needed  More than a month at PRN for seasonal allergy  Reported, Patient

## 2020-07-10 NOTE — PLAN OF CARE
ROOM # 207    Living Situation (if not independent, order SW consult): At home independently at home     Facility name: N/A  : [facility] : [phone]    Activity level at baseline: Independent  Activity level on admit: Independent      Patient registered to observation; given Patient Bill of Rights; given the opportunity to ask questions about observation status and their plan of care.  Patient has been oriented to the observation room, bathroom and call light is in place.    Discussed discharge goals and expectations with patient/family.

## 2020-07-10 NOTE — TELEPHONE ENCOUNTER
Alvino called in response to a message from Dr. Smalls with his CT results. Alvino stated he was on his way to the ED d/t continued abdominal pain when he received the message. He stated he had been told by the ED staff that he has diverticulitis. The plan is for him to stay overnight and receive IV antibiotics and be monitored. Reassured Alvino that this is not related to his cancer and he will be treated appropriately for diverticulitis by the inpatient providers.     Reviewed his upcoming appts [place] [facility] [place] of [place]13. Alvino will not need a discharge follow-up visit as he is already schedule with KADEN Thorpe on 7/16.     Encouraged Alvino to call with any additional questions or concerns.     Sarah Medeiros, KMN, RN  RN Care Coordinator  [facility]

## 2020-07-10 NOTE — ED NOTES
[facility]  ED Nurse Handoff Report    Walter Reyes is a 60 year old male   ED Chief complaint: Abdominal Pain  . ED Diagnosis:   Final diagnoses:   Diverticulitis of colon     Allergies:   Allergies   Allergen Reactions     Doxycycline GI Disturbance       Code Status: Full Code  Activity level - Baseline/Home:  Independent. Activity Level - Current:   Independent. Lift room needed: No. Bariatric: No   Needed: No   Isolation: No. Infection: Not Applicable.     Vital Signs:   Vitals:    07/10/20 1334   BP: (!) 134/91   Pulse: 73   Resp: 18   Temp: 98  F (36.7  C)   SpO2: 96%   Weight: 111 kg (244 lb 11.4 oz)   Height: 1.829 m (6')       Cardiac Rhythm:  ,      Pain level:    Patient confused: No. Patient Falls Risk: No.   Elimination Status: Has voided   Patient Report - Initial Complaint: LLQ abdominal pain. Focused Assessment: Presents to ED with 3-day hx of LLQ abdominal pain for the past 3 days. Hx of prostate and kidney cancer. Pt has frequent CT scans of his abdomen and was found to have diverticulosis. Pt states pain is worse when he stands up. Pt has a port that is accessed and infusing antibiotics. Last chemotherapy August 2019. ABCs intact. A&OX4.  Tests Performed: Labs, urine. Abnormal Results:   Labs Ordered and Resulted from Time of ED Arrival Up to the Time of Departure from the ED   LACTIC ACID WHOLE BLOOD - Abnormal; Notable for the following components:       Result Value    Lactic Acid 0.6 (*)     All other components within normal limits   COMPREHENSIVE METABOLIC PANEL   CBC WITH PLATELETS   COVID-19 VIRUS (CORONAVIRUS) BY PCR   MAY SALINE LOCK IV     .   Treatments provided: IV Ciprofloxacin, IV Flagyl  Family Comments: No family here.  OBS brochure/video discussed/provided to patient:  YES  ED Medications:   Medications   ciprofloxacin (CIPRO) infusion 400 mg (400 mg Intravenous New Bag 7/10/20 7611)   metroNIDAZOLE (FLAGYL) infusion 500 mg (500 mg Intravenous  New Bag 7/10/20 9944)     Drips infusing:  Yes - antibiotics infusing over an hour.  For the majority of the shift, the patient's behavior Green. Interventions performed were N/A.    Sepsis treatment initiated: No       ED Nurse Name/Phone Number: Zulma Romero RN,   3:13 PM    RECEIVING UNIT ED HANDOFF REVIEW    Above ED Nurse Handoff Report was reviewed: Yes  Reviewed by: Nehal Duran RN on July 10, 2020 at 4:12 PM

## 2020-07-10 NOTE — TELEPHONE ENCOUNTER
Dr Smalls calling with diverticulitis findings on CT from today and unable to reach pt. Called to [facility] where pt is currently in ED. LM for his RN to call back. Pt is in ED for abd pain. Pt also returned call to Sarah Medeiros RNCC acknowledging Dr Smalls's message.  Communicated with ED staff that pt is receiving zometa and lupron and no changes in plan of care from an oncology standpoint. Able to treat as needed.

## 2020-07-10 NOTE — H&P
History and Physical     Walter Reyes MRN# [id number]   YOB: 1960 Age: 60 year old      Date of Admission:  7/10/2020    Primary care provider: Poncho Ortiz          Assessment and Plan:   Walter Reyes is a 60 year old male with a PMH significant for renal cancer s/p nephrectomy, prostate cancer currently in remission, diverticulosis, hypertension, GERD and hypothyroidism who presents with abdominal pain.    Patient was discussed with Dean Silva NP, who was provider in ED. Chart review of ED work up was reviewed as well as chart review of Care Everywhere, previous visits and admissions.     #Diverticulitis  Patient has history of diverticulosis but has never had diverticulitis.  He describes recently having some intermittent left-sided abdominal pain that has become more persistent at night.  He denies fever, chills, diarrhea, nausea and vomiting.  He is eating and drinking without difficulty and states the pain does not very severe.  He had a routine CT scan today to assess his kidney and prostate which showed diverticulitis of the sigmoid colon with a tiny amount of free air but no evidence of abscess.  He is afebrile with a normal white count.  The ED has asked that we admit him for observation overnight and have started him on IV Cipro and Flagyl.  -Pain control with Tylenol and oral oxycodone  -Continue IV Ciprofloxacin and Flagyl for now but will likely transition to oral antibiotics tomorrow  -Clear liquid diet    #Hypertension  -Continue amlodipine    #HLP  -Continue atorvastatin    #Hypothyroidism  -Continue levothyroxine    #GERD  -Continue omeprazole    #History of renal cell carcinoma s/p right nephrectomy  #History of prostate cancer currently in remission  She had a routine scan of his chest abdomen and pelvis today due to his history of prostate and renal cell carcinoma.  He also had a bone scan today and I did not discuss those results with him.  He is  followed by Dr. Barnett from oncology.         Social: No concerns   Code: Discussed with patient and they have chosen full code  VTE prophylaxis: PCDs  Disposition: Observation                    Chief Complaint:   Abdominal pain         History of Present Illness:   Walter Reyes is a 60 year old male who presents with intermittent abdominal pain.  He cannot tell me when this started but he is noted intermittent left lower quadrant abdominal pain that initially was worse with movement but now is present at night.  It can become quite severe.  He denies associated fever, chills, nausea, vomiting and diarrhea.  He has no history of diverticulitis but states that there has been diverticulosis seen on his CT scans which she is getting every 3 months for his history of cancer.  He has not been ill recently, denies smoking and alcohol use and has not started any new medicines recently.             Past Medical History:     Past Medical History:   Diagnosis Date     Cancer of kidney, right (H)      Complication of anesthesia     slow wakeup      Malignant neoplasm of prostate metastatic to bone (H) 2/14/2019     Thyroid disease                Past Surgical History:     Past Surgical History:   Procedure Laterality Date     CYSTOSCOPY      about 10 years ago     INSERT PORT VASCULAR ACCESS Right 5/2/2019    Procedure: Chest Port Placement;  Surgeon: Tate Burciaga PA-C;  Location: [facility] OR     IR CHEST PORT PLACEMENT > 5 YRS OF AGE  5/2/2019     LAPAROSCOPIC NEPHRECTOMY Right 3/19/2019    Procedure: Right laparoscopic radical nephrectomy;  Surgeon: Wesley Cleveland MD;  Location: [facility] OR               Social History:     Social History     Socioeconomic History     Marital status:      Spouse name: Not on file     Number of children: Not on file     Years of education: Not on file     Highest education level: Not on file   Occupational History     Not on file   Social Needs     Financial  resource strain: Not on file     Food insecurity     Worry: Not on file     Inability: Not on file     Transportation needs     Medical: Not on file     Non-medical: Not on file   Tobacco Use     Smoking status: Never Smoker     Smokeless tobacco: Never Used   Substance and Sexual Activity     Alcohol use: Not on file     Comment: wine - very rare     Drug use: No     Sexual activity: Not on file   Lifestyle     Physical activity     Days per week: Not on file     Minutes per session: Not on file     Stress: Not on file   Relationships     Social connections     Talks on phone: Not on file     Gets together: Not on file     Attends Worship service: Not on file     Active member of club or organization: Not on file     Attends meetings of clubs or organizations: Not on file     Relationship status: Not on file     Intimate partner violence     Fear of current or ex partner: Not on file     Emotionally abused: Not on file     Physically abused: Not on file     Forced sexual activity: Not on file   Other Topics Concern     Parent/sibling w/ CABG, MI or angioplasty before 65F 55M? Not Asked   Social History Narrative     Not on file               Family History:     Family History   Problem Relation Age of Onset     Diabetes Father      Family history reviewed and is non contributory         Allergies:      Allergies   Allergen Reactions     Doxycycline GI Disturbance               Medications:     Prior to Admission medications    Medication Sig Last Dose Taking? Auth Provider   amLODIPine (NORVASC) 10 MG tablet Take 1 tablet (10 mg) by mouth daily 7/9/2020 at PM Yes Uday Smalls MD   atorvastatin (LIPITOR) 20 MG tablet Take 20 mg by mouth daily 7/9/2020 at PM Yes Reported, Patient   calcium citrate-vitamin D (CITRACAL) 315-250 MG-UNIT TABS per tablet Take 650 mg by mouth 2 times daily  7/9/2020 at PM Yes Reported, Patient   co-enzyme Q-10 100 MG CAPS capsule Take 100 mg by mouth daily  7/9/2020 at PM Yes Reported,  Patient   cycloSPORINE (RESTASIS) 0.05 % ophthalmic emulsion Place 1 drop into both eyes 2 times daily  7/10/2020 at AM Yes Reported, Patient   Glucosamine-Chondroit-Vit C-Mn (GLUCOSAMINE 1500 COMPLEX) CAPS Take 1 capsule by mouth daily 7/9/2020 at PM Yes Reported, Patient   levothyroxine (SYNTHROID/LEVOTHROID) 100 MCG tablet Take 100 mcg by mouth daily 7/10/2020 at AM Yes Unknown, Entered By History   Multiple Vitamins-Minerals (MULTIVITAMIN ADULT EXTRA C PO) Take 1 tablet by mouth every 24 hours 7/9/2020 at PM Yes Reported, Patient   Nutritional Supplements (SALMON OIL) CAPS Take 2 capsules by mouth daily  7/9/2020 at PM Yes Reported, Patient   omeprazole (PRILOSEC) 20 MG DR capsule Take 20 mg by mouth daily Past Week at stopped taking a few days ago Yes Reported, Patient   cetirizine (ZYRTEC) 10 MG tablet Take 10 mg by mouth as needed for allergies  More than a month at PRN for seasonal allergy  Reported, Patient   fluticasone (FLONASE) 50 MCG/ACT nasal spray Spray 2 sprays in nostril daily as needed  More than a month at PRN for seasonal allergy  Reported, Patient              Review of Systems:   A Comprehensive greater than 10 system review of systems was carried out.  Pertinent positives and negatives are noted above.  Otherwise negative for contributory information.            Physical Exam:   Blood pressure 129/86, pulse 60, temperature 97.7  F (36.5  C), temperature source Oral, resp. rate 18, height 1.829 m (6'), weight 110.3 kg (243 lb 1.6 oz), SpO2 98 %.  Exam:  GENERAL:  Comfortable.  PSYCH: pleasant, oriented, No acute distress.  HEENT:  PERRLA. Normal conjunctiva, normal hearing, nasal mucosa and Oropharynx are normal.  NECK:  Supple, no neck vein distention, adenopathy or bruits, normal thyroid.  HEART:  Normal S1, S2 with no murmur, no pericardial rub, gallops or S3 or S4.  LUNGS:  Clear to auscultation, normal Respiratory effort. No wheezing, rales or ronchi.  ABDOMEN:  Soft, no  hepatosplenomegaly, normal bowel sounds.  Tender left side of the abdomen.  EXTREMITIES:  No pedal edema, +2 pulses bilateral and equal.  SKIN:  Dry to touch, No rash, wound or ulcerations.  There is a right-sided port that is free of erythema and discomfort with palpation  NEUROLOGIC:  CN 2-12 grossly intact, sensation is intact with no focal deficits.               Data:     Recent Labs   Lab 07/10/20  1442   WBC 8.1   HGB 13.7   HCT 43.2   MCV 92        Recent Labs   Lab 07/10/20  1442      POTASSIUM 4.0   CHLORIDE 106   CO2 26   ANIONGAP 6   GLC 94   BUN 16   CR 1.12   GFRESTIMATED 72   GFRESTBLACK 83   BETHANY 8.9   PROTTOTAL 7.2   ALBUMIN 3.8   BILITOTAL 0.6   ALKPHOS 55   AST 16   ALT 21     No results for input(s): LIPASE in the last 168 hours.      Recent Results (from the past 24 hour(s))   CT Chest Abdomen Pelvis w/o Contrast   Result Value    Radiologist flags Acute uncomplicated diverticulitis (Urgent)    Narrative    EXAMINATION: CT CHEST ABDOMEN PELVIS W/O CONTRAST  7/10/2020 8:44 AM      CLINICAL HISTORY: Metastatic hormone-sensitive prostate cancer, s/p  docetaxel+ADT; and h/o stage 3 RCC s/p nephrectomy. Restaging scan.;  Malignant neoplasm of prostate metastatic to bone (H); Malignant  neoplasm of prostate metastatic to bone (H)    COMPARISON: 4/6/2020        PROCEDURE COMMENTS: CT of the chest, abdomen, and pelvis was performed  without intravenous contrast. Axial MIP  images of the chest, and  coronal and sagittal reformatted images of the chest, abdomen, and  pelvis obtained.    FINDINGS:    Support devices: Right chest wall port tip near the low SVC.    Chest:  The thyroid is unremarkable. The trachea and central airways are  clear. No focal pulmonary infiltrate. No pleural effusion. Moderate to  large hiatal hernia is present. 3 mm pulmonary nodule in the lingula  similar to prior exam (series 2, image 188). No other new or enlarging  pulmonary nodules.    There are no abnormally  sized axillary, hilar, or mediastinal lymph  nodes. Some prominent residual thymic tissue is present, similar to  prior.    Moderate coronary artery calcifications. Heart size not enlarged. No  pericardial effusion. Normal configuration of the great vessels    Abdomen/pelvis:  The noncontrast appearance of the liver, gallbladder, pancreas and  spleen are unremarkable. Adrenal glands are normal.    Postoperative changes from right nephrectomy. No left-sided renal  lesions. No hydronephrosis or hydroureter. The bladder is partially  distended and unremarkable.    Inflammatory changes surrounding the sigmoid colon on series 4, image  99 with fat stranding in the small amount of associated free fluid in  the left paracolic gutter. A scant amount of free air is also present  located anterior to the colon on series 8, image 482. No overt abscess  on this noncontrast exam. Findings likely related to acute  diverticulitis. Appendix is unremarkable. No dilated loops of bowel.     There are no abnormally sized lymph nodes. Major vasculature of the  abdomen is patent.    Bones:   Stable sclerotic lesion within the left femoral neck, series 8, image  597. Scattered small sclerotic foci in the left acetabulum and left  fourth and sixth ribs. No new sclerotic lesions are appreciated.      Impression    IMPRESSION:  In this patient with a history of prostate cancer and right renal cell  carcinoma status post right nephrectomy:  1. Acute diverticulitis, likely uncomplicated, of the sigmoid colon  with prominent inflammatory changes surrounding the colon and a tiny  amount of free air. No evidence for abscess although sensitivity for  abscess on this noncontrast CT as well.  2. No evidence of intrathoracic or intra-abdominal metastatic disease.  3. No significant change in the osseous metastatic disease. Recommend  follow-up with same day nuclear medicine bone scan.  4. Moderate to large hiatal hernia.    [Access Center: Acute  uncomplicated diverticulitis]    This report will be copied to the [facility] to ensure a  provider acknowledges the finding. Access Center is available Monday  through Friday 8am-3:30 pm.    I have personally reviewed the examination and initial interpretation  and I agree with the findings.    SHIRA CHAHAL MD   NM Bone Scan Whole Body    Narrative    EXAMINATION: NM BONE SCAN WHOLE BODY  Whole-body bone scan, 7/10/2020 12:01 PM     HISTORY: Prostate cancer    ADDITIONAL INFORMATION: Metastatic hormone sensitive prostate cancer  status post 6 cycles of docetaxel and ADT, now on ADT alone. Also with  history of stage III clear cell RCC status post radical right  nephrectomy March 20, 2019.    COMPARISON: Bone scan 4/6/2020 and 5/3/2019; CT chest abdomen and  pelvis 7/10/2020    TECHNIQUE: The patient received 25.60 mCi of Tc-99m MDP intravenously.  Whole body bone images were obtained at 3 hours.    FINDINGS: The similar intensity of uptake at the previously identified  foci in the calvarium, anterior left fourth rib, left posterior fourth  and sixth ribs, and right posterior third and fifth rib. A similar  density of uptake at the previously identified focus in the left  proximal femur. There is a new focus of uptake in the right lamina  which correlates with a sclerotic lesion on CT 7/10/2020 series 5  image 78 (increase in size on CT from 4/6/2020). Also noted are  degenerative changes of bilateral shoulders, cervical spine, knees,  and ankles. The right kidney is not seen compatible with history of  right nephrectomy.      Impression    IMPRESSION:   1. Increased right L5 lamina metastatic lesion, was smaller on  4/6/2020 CT, now also visible on bone scan.    2. No new lesions, otherwise stable foci of osseous metastatic  disease.     I have personally reviewed the examination and initial interpretation  and I agree with the findings.    MD Tiffanie DAVIDSON PA-C

## 2020-07-11 VITALS
DIASTOLIC BLOOD PRESSURE: 75 MMHG | TEMPERATURE: 97.4 F | HEIGHT: 72 IN | WEIGHT: 243.1 LBS | OXYGEN SATURATION: 98 % | HEART RATE: 72 BPM | BODY MASS INDEX: 32.93 KG/M2 | RESPIRATION RATE: 16 BRPM | SYSTOLIC BLOOD PRESSURE: 124 MMHG

## 2020-07-11 LAB
ANION GAP SERPL CALCULATED.3IONS-SCNC: 5 MMOL/L (ref 3–14)
BUN SERPL-MCNC: 11 MG/DL (ref 7–30)
CALCIUM SERPL-MCNC: 8.4 MG/DL (ref 8.5–10.1)
CHLORIDE SERPL-SCNC: 110 MMOL/L (ref 94–109)
CO2 SERPL-SCNC: 26 MMOL/L (ref 20–32)
CREAT SERPL-MCNC: 1.08 MG/DL (ref 0.66–1.25)
ERYTHROCYTE [DISTWIDTH] IN BLOOD BY AUTOMATED COUNT: 13.3 % (ref 10–15)
GFR SERPL CREATININE-BSD FRML MDRD: 75 ML/MIN/{1.73_M2}
GLUCOSE SERPL-MCNC: 102 MG/DL (ref 70–99)
HCT VFR BLD AUTO: 40.7 % (ref 40–53)
HGB BLD-MCNC: 13 G/DL (ref 13.3–17.7)
MCH RBC QN AUTO: 29.2 PG (ref 26.5–33)
MCHC RBC AUTO-ENTMCNC: 31.9 G/DL (ref 31.5–36.5)
MCV RBC AUTO: 92 FL (ref 78–100)
PLATELET # BLD AUTO: 157 10E9/L (ref 150–450)
POTASSIUM SERPL-SCNC: 3.8 MMOL/L (ref 3.4–5.3)
RBC # BLD AUTO: 4.45 10E12/L (ref 4.4–5.9)
SARS-COV-2 RNA SPEC QL NAA+PROBE: NOT DETECTED
SODIUM SERPL-SCNC: 141 MMOL/L (ref 133–144)
SPECIMEN SOURCE: NORMAL
WBC # BLD AUTO: 5.2 10E9/L (ref 4–11)

## 2020-07-11 PROCEDURE — 99217 ZZC OBSERVATION CARE DISCHARGE: CPT | Performed by: PHYSICIAN ASSISTANT

## 2020-07-11 PROCEDURE — 80048 BASIC METABOLIC PNL TOTAL CA: CPT | Performed by: PHYSICIAN ASSISTANT

## 2020-07-11 PROCEDURE — G0378 HOSPITAL OBSERVATION PER HR: HCPCS

## 2020-07-11 PROCEDURE — 96376 TX/PRO/DX INJ SAME DRUG ADON: CPT

## 2020-07-11 PROCEDURE — 25000128 H RX IP 250 OP 636: Performed by: PHYSICIAN ASSISTANT

## 2020-07-11 PROCEDURE — 85027 COMPLETE CBC AUTOMATED: CPT | Performed by: PHYSICIAN ASSISTANT

## 2020-07-11 PROCEDURE — 25000132 ZZH RX MED GY IP 250 OP 250 PS 637: Performed by: PHYSICIAN ASSISTANT

## 2020-07-11 RX ORDER — ACETAMINOPHEN 325 MG/1
650 TABLET ORAL EVERY 4 HOURS PRN
Start: 2020-07-11 | End: 2020-08-04

## 2020-07-11 RX ORDER — CIPROFLOXACIN 500 MG/1
500 TABLET, FILM COATED ORAL 2 TIMES DAILY
Qty: 18 TABLET | Refills: 0 | Status: SHIPPED | OUTPATIENT
Start: 2020-07-11 | End: 2020-07-11

## 2020-07-11 RX ORDER — CIPROFLOXACIN 500 MG/1
500 TABLET, FILM COATED ORAL 2 TIMES DAILY
Qty: 24 TABLET | Refills: 0 | Status: SHIPPED | OUTPATIENT
Start: 2020-07-11 | End: 2020-08-04

## 2020-07-11 RX ORDER — METRONIDAZOLE 500 MG/1
500 TABLET ORAL 3 TIMES DAILY
Qty: 27 TABLET | Refills: 0 | Status: SHIPPED | OUTPATIENT
Start: 2020-07-11 | End: 2020-07-11

## 2020-07-11 RX ORDER — METRONIDAZOLE 500 MG/1
500 TABLET ORAL 3 TIMES DAILY
Qty: 36 TABLET | Refills: 0 | Status: SHIPPED | OUTPATIENT
Start: 2020-07-11 | End: 2020-08-04

## 2020-07-11 RX ORDER — POLYETHYLENE GLYCOL 3350 17 G/17G
1 POWDER, FOR SOLUTION ORAL 2 TIMES DAILY
Qty: 28 PACKET | Refills: 0
Start: 2020-07-11 | End: 2020-08-04

## 2020-07-11 RX ADMIN — Medication 5 ML: at 02:46

## 2020-07-11 RX ADMIN — METRONIDAZOLE 500 MG: 500 INJECTION, SOLUTION INTRAVENOUS at 04:02

## 2020-07-11 RX ADMIN — Medication 5 ML: at 05:06

## 2020-07-11 RX ADMIN — LEVOTHYROXINE SODIUM 100 MCG: 0.1 TABLET ORAL at 08:07

## 2020-07-11 RX ADMIN — HEPARIN 5 ML: 100 SYRINGE at 09:52

## 2020-07-11 RX ADMIN — CIPROFLOXACIN 400 MG: 2 INJECTION, SOLUTION INTRAVENOUS at 02:45

## 2020-07-11 NOTE — PLAN OF CARE
PRIMARY DIAGNOSIS: DIVERTICULITITS  OUTPATIENT/OBSERVATION GOALS TO BE MET BEFORE DISCHARGE:  1. ADLs back to baseline: Yes    2. Activity and level of assistance: Ambulating independently.    3. Pain status: Denies pain    4. Return to near baseline physical activity: Yes     Discharge Planner Nurse   Safe discharge environment identified: Yes  Barriers to discharge: Yes       Entered by: Argelia Cooper 07/11/2020 4:23 AM     Vitals are Temp: 97.5  F (36.4  C) Temp src: Oral BP: 120/78 Pulse: 55   Resp: 18 SpO2: 96 %.  Patient is Alert and Oriented x4. They are independent with no assistive devices .  Pt is a Clear liquid diet and tolerating.  They are denying pain. Pt declined nausea and vomiting. Patient has port in right chest and is Heparin locked in between antibiotics. Call light within reach. Will continue to monitor.     Please review provider order for any additional goals.   Nurse to notify provider when observation goals have been met and patient is ready for discharge.

## 2020-07-11 NOTE — PLAN OF CARE
PRIMARY DIAGNOSIS: DIVERTICULITITS  OUTPATIENT/OBSERVATION GOALS TO BE MET BEFORE DISCHARGE:  1. ADLs back to baseline: Yes    2. Activity and level of assistance: Ambulating independently.    3. Pain status: Denies pain    4. Return to near baseline physical activity: Yes     Discharge Planner Nurse   Safe discharge environment identified: Yes  Barriers to discharge: Yes       Entered by: Argelia Cooper 07/11/2020 12:18 AM     Vitals are Temp: 97.8  F (36.6  C) Temp src: Oral BP: 97/72 Pulse: 65   Resp: 16 SpO2: 96 %.  Patient is Alert and Oriented x4. They are independent with no assistive devices .  Pt is a Clear liquid diet and tolerating.  They are denying pain. Pt declined nausea and vomiting. Patient has port in right chest and is Heparin locked in between antibiotics. Call light within reach. Will continue to monitor.     Please review provider order for any additional goals.   Nurse to notify provider when observation goals have been met and patient is ready for discharge.

## 2020-07-11 NOTE — PLAN OF CARE
PRIMARY DIAGNOSIS: DIVERTICULITITS  OUTPATIENT/OBSERVATION GOALS TO BE MET BEFORE DISCHARGE:  ADLs back to baseline: Yes    Activity and level of assistance: Ambulating independently.    Pain status: 1/10 pain in left lower abdomen.     Return to near baseline physical activity: Yes     Discharge Planner Nurse   Safe discharge environment identified: Yes  Barriers to discharge: Yes       Entered by: Argelia Cooper 07/10/2020 9:38 PM     Vitals are Temp: 97.3  F (36.3  C) Temp src: Oral BP: 116/75 Pulse: 58   Resp: 18 SpO2: 96 %.  Patient is Alert and Oriented x4. They are independent with no assistive devices .  Pt is a Clear liquid diet and tolerating.  They are complaining of 1/10 pain in their left lower abdomen, declined for interventions. Declines need for intervention.   Pt declined nausea and vomiting. Patient has port in right chest and is Heparin locked in between antibiotics. Call light within reach. Will continue to monitor.     Please review provider order for any additional goals.   Nurse to notify provider when observation goals have been met and patient is ready for discharge.

## 2020-07-11 NOTE — PLAN OF CARE
PRIMARY DIAGNOSIS: DIVERTICULITITS  OUTPATIENT/OBSERVATION GOALS TO BE MET BEFORE DISCHARGE:  ADLs back to baseline: Yes     Activity and level of assistance: Ambulating independently.     Pain status: Denies pain     Return to near baseline physical activity: Yes          Discharge Planner Nurse   Safe discharge environment identified: Yes  Barriers to discharge: Yes     Patient is Alert and Oriented x4. They are independent with no assistive devices .  Pt is tolerating clears, plan to advance to low fiber diet for breakfast.  He denies pain, nausea and vomiting. Patient has port in right chest and is Heparin locked in between antibiotics. Call light within reach. Will continue to monitor.

## 2020-07-11 NOTE — DISCHARGE INSTRUCTIONS
Eating a Low-fiber Diet  What is fiber?  Fiber is the part of food that the body cannot digest. It helps form stools (bowel movements).   If you eat less fiber, you may:    Reduce belly pain, diarrhea (loose, watery stools) and other digestive problems    Have fewer and smaller stools    Decrease inflammation (pain, redness and swelling) in the GI (gastro-intestinal) tract    Promote healing in the GI tract.  For a list of foods allowed in a low-fiber diet, see the back of this page.  Why might I need a low-fiber diet?  You may need a low-fiber diet if you have:     Inflamed bowels    Crohn's disease    Diverticular disease    Ulcerative colitis    Radiation therapy to the belly area    Chemotherapy    An upcoming colonoscopy    Surgery on your intestines or in the belly area.  Sample Menu  Breakfast:   1 scrambled egg   1 slice white toast with 1 teaspoon margarine     cup Cream of Wheat with sugar     cup milk      cup pulp-free orange juice  Snack:     cup canned fruit cocktail (in juice)   6 saltine crackers  Lunch:   Tuna sandwich on white bread    1 cup cream of chicken soup     cup canned peaches (in light syrup)   1 cup lemonade  Snack:     cup cottage cheese   1 medium apple, sliced and peeled  Dinner:   3 ounces well-cooked chicken breast   1 cup white rice     cup cooked canned carrots   1 white dinner roll with 1 teaspoon margarine   1 slice dayo food cake   1 cup herbal tea  Food group Allowed Avoid   Grains Foods that contain refined white flour   (1 gram fiber or less per serving), such as bread, pasta, muffins, cereals, crackers, etc.; white rice; Cream of Wheat; Cream of Rice Whole grains (whole wheat bread, oatmeal, barley, brown or wild rice); foods containing nuts, seeds or bran   Vegetables Canned or well-cooked vegetables; mashed potatoes; non-gas-forming vegetables; vegetables without skin, seeds or pulp; vegetable juice ( 1/2 cup per day or less) Raw vegetables; cooked greens or spinach;  "  gas-forming vegetables (broccoli, cauliflower, brussels sprouts)   Fruits Peeled fresh fruit (bananas, apples, melons, nectarines); canned fruit (in juice or light syrup); fruit juice without pulp Dried fruit; fruit with pulp (oranges, grapefruit, pineapple); unpeeled fruit; prune juice   Meats and   other proteins Tender, well-cooked or ground meats; fish; eggs; tofu; smooth nut butters (peanut, soy, almond, sunflower) Crunchy nut butters; tough meats; meats with gristle (adler, sausage); dried beans or peas (legumes)   Milk products Milk, soy milk, rice milk, almond milk, coconut milk; yogurt, soy yogurt; cottage cheese, mild cheese; ice cream, sherbet If you are lactose intolerant: avoid milk, dairy products and foods made with milk  Note: Some people become lactose intolerant after surgery. This may or may not improve over time.   Other Salad dressings; oil, butter, margarine; jelly, honey, syrup Any food containing nuts or seeds; coconut; marmalade; carbonated (\"fizzy\") drinks   For informational purposes only. Not to replace the advice of your health care provider.   Copyright   2007 [facility]. All rights reserved. Hatsize [id number] - REV 09/15.    "

## 2020-07-11 NOTE — DISCHARGE SUMMARY
[facility]    Discharge Summary  Hospitalist    Date of Admission:  7/10/2020  Date of Discharge:  7/11/2020 10:12 AM  Discharging Provider: Anisha Todd PA-C  Date of Service (when I saw the patient): 07/11/20    Discharge Diagnoses   Acute diverticulitis     History of Present Illness   Walter Reyes is a 60 year old male with a PMH significant for renal cancer s/p nephrectomy, prostate cancer currently in remission, diverticulosis, hypertension, GERD and hypothyroidism who presented with abdominal pain. He had a routine restaging CT scan to assess his kidney and prostate which showed acute diverticulitis of the sigmoid colon with a tiny amount of free air but no evidence of abscess. The patient was started on IV antibiotics and was admitted overnight from the ED to observation.   The patient remained non toxic, with benign abdominal exam and reassuring labs. Discharged home with oral antibiotics and direction to avoid constipation, stay with low residue diet. He is to follow up with his oncologist regarding restaging scans as previously directed.     Please see admitting H & P for full details of the encounter.       Pending Results     Unresulted Labs Ordered in the Past 30 Days of this Admission     Date and Time Order Name Status Description    7/10/2020 1413 Asymptomatic COVID-19 Virus (Coronavirus) by PCR In process           Code Status   Full Code       Primary Care Physician   GATO AQUINO      INTERVAL HISTORY  Patient reports his abdominal pain 75 % better than admission. Tolerating normal diet and without nausea, emesis, fevers. No hematochezia. No chest pain, shortness of breath. Patient declined offer to update his wife over the phone.     /74 (BP Location: Right arm)   Pulse 59   Temp 97.6  F (36.4  C) (Oral)   Resp 16   Ht 1.829 m (6')   Wt 110.3 kg (243 lb 1.6 oz)   SpO2 96%   BMI 32.97 kg/m      Constitutional: Awake, alert, no apparent  distress  Respiratory:  Normal work of breathing. Lungs clear to auscultation bilaterally, no crackles or wheezing.  Cardiovascular: Regular rate and rhythm, normal S1 and S2, and no murmur appreciated.   GI: Bowel sounds present. soft, non-distended, slight tenderness to palpation of LLQ.   Skin/Integument: Warm, dry. no peripheral edema.  Neuro: No focal deficits. Moving all extremities with normal strength. Coordination and sensation grossly intact. Speech clear.   Psych: Appropriate affect.        Discharge Disposition   Discharged to home  Condition at discharge: Stable    Consultations This Hospital Stay   None    Time Spent on this Encounter   Anisha DEL VALLE PA-C, personally saw the patient today and spent greater than 30 minutes discharging this patient.    Discharge Orders      Reason for your hospital stay    You were hospitalized for evaluation of left-sided abdominal pain found to have diverticulitis on CT scan that was obtained to assess his kidney and prostate which showed diverticulitis of the sigmoid colon with a tiny amount of free air but no evidence of abscess.  You had well controlled pain, remained without fever or increasing white cell count.  You are medically stable for discharge home on oral antibiotics and follow-up with primary care provider as well as your oncology team.     Follow-up and recommended labs and tests     Follow up with primary care provider, GATO AQUINO, within 7 days to evaluate medication change and for hospital follow- up. CBC.  Follow up wtih Dr. Barnett from oncology regarding your Bone and CT scans.         Activity    Your activity upon discharge: activity as tolerated     Discharge Instructions     When to contact your care team    Call your primary care doctor if you have any of the following: temperature greater than 101 F, worsening shortness of breath, increased swelling, worsening intractable abdominal pain, new or unrelenting diarrhea, or any other  concerning symptoms. Call 911 or go to the emergency room if you need immediate assistance.     Diet    Follow this diet upon discharge: Low fiber diet     Discharge Medications   Current Discharge Medication List      START taking these medications    Details   acetaminophen (TYLENOL) 325 MG tablet Take 2 tablets (650 mg) by mouth every 4 hours as needed for mild pain  Qty:      Associated Diagnoses: Diverticulitis      ciprofloxacin (CIPRO) 500 MG tablet Take 1 tablet (500 mg) by mouth 2 times daily for 9 days  Qty: 18 tablet, Refills: 0    Associated Diagnoses: Diverticulitis      metroNIDAZOLE (FLAGYL) 500 MG tablet Take 1 tablet (500 mg) by mouth 3 times daily for 9 days  Qty: 27 tablet, Refills: 0    Associated Diagnoses: Diverticulitis         CONTINUE these medications which have NOT CHANGED    Details   amLODIPine (NORVASC) 10 MG tablet Take 1 tablet (10 mg) by mouth daily  Qty: 30 tablet, Refills: 0    Associated Diagnoses: HTN (hypertension)      atorvastatin (LIPITOR) 20 MG tablet Take 20 mg by mouth daily      calcium citrate-vitamin D (CITRACAL) 315-250 MG-UNIT TABS per tablet Take 650 mg by mouth 2 times daily       co-enzyme Q-10 100 MG CAPS capsule Take 100 mg by mouth daily       cycloSPORINE (RESTASIS) 0.05 % ophthalmic emulsion Place 1 drop into both eyes 2 times daily       Glucosamine-Chondroit-Vit C-Mn (GLUCOSAMINE 1500 COMPLEX) CAPS Take 1 capsule by mouth daily      levothyroxine (SYNTHROID/LEVOTHROID) 100 MCG tablet Take 100 mcg by mouth daily      Multiple Vitamins-Minerals (MULTIVITAMIN ADULT EXTRA C PO) Take 1 tablet by mouth every 24 hours      Nutritional Supplements (SALMON OIL) CAPS Take 2 capsules by mouth daily       omeprazole (PRILOSEC) 20 MG DR capsule Take 20 mg by mouth daily      cetirizine (ZYRTEC) 10 MG tablet Take 10 mg by mouth as needed for allergies       fluticasone (FLONASE) 50 MCG/ACT nasal spray Spray 2 sprays in nostril daily as needed            Allergies    Allergies   Allergen Reactions     Doxycycline GI Disturbance     Data   Most Recent 3 CBC's:  Recent Labs   Lab Test 07/11/20  0511 07/10/20  1442 04/08/20  0930   WBC 5.2 8.1 10.4   HGB 13.0* 13.7 13.3   MCV 92 92 90    177 226      Most Recent 3 BMP's:  Recent Labs   Lab Test 07/11/20  0511 07/10/20  1442 04/08/20  0930    138 140   POTASSIUM 3.8 4.0 4.0   CHLORIDE 110* 106 107   CO2 26 26 28   BUN 11 16 20   CR 1.08 1.12 1.08   ANIONGAP 5 6 5   BETHANY 8.4* 8.9 9.4   * 94 102*     Most Recent 2 LFT's:  Recent Labs   Lab Test 07/10/20  1442 04/08/20  0930   AST 16 16   ALT 21 25   ALKPHOS 55 75   BILITOTAL 0.6 0.5       Results for orders placed or performed during the hospital encounter of 07/10/20   CT Chest Abdomen Pelvis w/o Contrast     Value    Radiologist flags Acute uncomplicated diverticulitis (Urgent)    Narrative    EXAMINATION: CT CHEST ABDOMEN PELVIS W/O CONTRAST  7/10/2020 8:44 AM      CLINICAL HISTORY: Metastatic hormone-sensitive prostate cancer, s/p  docetaxel+ADT; and h/o stage 3 RCC s/p nephrectomy. Restaging scan.;  Malignant neoplasm of prostate metastatic to bone (H); Malignant  neoplasm of prostate metastatic to bone (H)    COMPARISON: 4/6/2020        PROCEDURE COMMENTS: CT of the chest, abdomen, and pelvis was performed  without intravenous contrast. Axial MIP  images of the chest, and  coronal and sagittal reformatted images of the chest, abdomen, and  pelvis obtained.    FINDINGS:    Support devices: Right chest wall port tip near the low SVC.    Chest:  The thyroid is unremarkable. The trachea and central airways are  clear. No focal pulmonary infiltrate. No pleural effusion. Moderate to  large hiatal hernia is present. 3 mm pulmonary nodule in the lingula  similar to prior exam (series 2, image 188). No other new or enlarging  pulmonary nodules.    There are no abnormally sized axillary, hilar, or mediastinal lymph  nodes. Some prominent residual thymic tissue is  present, similar to  prior.    Moderate coronary artery calcifications. Heart size not enlarged. No  pericardial effusion. Normal configuration of the great vessels    Abdomen/pelvis:  The noncontrast appearance of the liver, gallbladder, pancreas and  spleen are unremarkable. Adrenal glands are normal.    Postoperative changes from right nephrectomy. No left-sided renal  lesions. No hydronephrosis or hydroureter. The bladder is partially  distended and unremarkable.    Inflammatory changes surrounding the sigmoid colon on series 4, image  99 with fat stranding in the small amount of associated free fluid in  the left paracolic gutter. A scant amount of free air is also present  located anterior to the colon on series 8, image 482. No overt abscess  on this noncontrast exam. Findings likely related to acute  diverticulitis. Appendix is unremarkable. No dilated loops of bowel.     There are no abnormally sized lymph nodes. Major vasculature of the  abdomen is patent.    Bones:   Stable sclerotic lesion within the left femoral neck, series 8, image  597. Scattered small sclerotic foci in the left acetabulum and left  fourth and sixth ribs. No new sclerotic lesions are appreciated.      Impression    IMPRESSION:  In this patient with a history of prostate cancer and right renal cell  carcinoma status post right nephrectomy:  1. Acute diverticulitis, likely uncomplicated, of the sigmoid colon  with prominent inflammatory changes surrounding the colon and a tiny  amount of free air. No evidence for abscess although sensitivity for  abscess on this noncontrast CT as well.  2. No evidence of intrathoracic or intra-abdominal metastatic disease.  3. No significant change in the osseous metastatic disease. Recommend  follow-up with same day nuclear medicine bone scan.  4. Moderate to large hiatal hernia.    [Access Center: Acute uncomplicated diverticulitis]    This report will be copied to the [facility] to  ensure a  provider acknowledges the finding. Access Center is available Monday  through Friday 8am-3:30 pm.    I have personally reviewed the examination and initial interpretation  and I agree with the findings.    MD Anisha DURAN PA-C  [facility]

## 2020-07-11 NOTE — PROGRESS NOTES
OBSERVATION patient END time: 1005    Patient's After Visit Summary was reviewed with patient and/or spouse.   Patient verbalized understanding of After Visit Summary, recommended follow up and was given an opportunity to ask questions.   Discharge medications sent home with patient/family: YES, RX sent to home pharmacy   Discharged with spouse

## 2020-07-13 ENCOUNTER — HOSPITAL ENCOUNTER (OUTPATIENT)
Facility: CLINIC | Age: 60
Setting detail: SPECIMEN
Discharge: HOME | End: 2020-07-13
Attending: PHYSICIAN ASSISTANT | Admitting: INTERNAL MEDICINE
Payer: COMMERCIAL

## 2020-07-13 ENCOUNTER — PATIENT OUTREACH (OUTPATIENT)
Dept: ONCOLOGY | Facility: CLINIC | Age: 60
End: 2020-07-13

## 2020-07-13 DIAGNOSIS — C61 MALIGNANT NEOPLASM OF PROSTATE METASTATIC TO BONE (H): ICD-10-CM

## 2020-07-13 DIAGNOSIS — C64.1 RENAL CELL CARCINOMA, RIGHT (H): ICD-10-CM

## 2020-07-13 DIAGNOSIS — C79.51 MALIGNANT NEOPLASM OF PROSTATE METASTATIC TO BONE (H): ICD-10-CM

## 2020-07-13 LAB
ALBUMIN SERPL-MCNC: 4 G/DL (ref 3.4–5)
ALP SERPL-CCNC: 52 U/L (ref 40–150)
ALT SERPL W P-5'-P-CCNC: 25 U/L (ref 0–70)
ANION GAP SERPL CALCULATED.3IONS-SCNC: 8 MMOL/L (ref 3–14)
AST SERPL W P-5'-P-CCNC: 18 U/L (ref 0–45)
BASOPHILS # BLD AUTO: 0 10E9/L (ref 0–0.2)
BASOPHILS NFR BLD AUTO: 0.5 %
BILIRUB SERPL-MCNC: 0.4 MG/DL (ref 0.2–1.3)
BUN SERPL-MCNC: 16 MG/DL (ref 7–30)
CALCIUM SERPL-MCNC: 9.3 MG/DL (ref 8.5–10.1)
CHLORIDE SERPL-SCNC: 105 MMOL/L (ref 94–109)
CO2 SERPL-SCNC: 24 MMOL/L (ref 20–32)
CREAT SERPL-MCNC: 1.22 MG/DL (ref 0.66–1.25)
DIFFERENTIAL METHOD BLD: NORMAL
EOSINOPHIL # BLD AUTO: 0.2 10E9/L (ref 0–0.7)
EOSINOPHIL NFR BLD AUTO: 3.6 %
ERYTHROCYTE [DISTWIDTH] IN BLOOD BY AUTOMATED COUNT: 13.1 % (ref 10–15)
GFR SERPL CREATININE-BSD FRML MDRD: 65 ML/MIN/{1.73_M2}
GLUCOSE SERPL-MCNC: 92 MG/DL (ref 70–99)
HCT VFR BLD AUTO: 44.2 % (ref 40–53)
HGB BLD-MCNC: 14.2 G/DL (ref 13.3–17.7)
IMM GRANULOCYTES # BLD: 0 10E9/L (ref 0–0.4)
IMM GRANULOCYTES NFR BLD: 0.2 %
LDH SERPL L TO P-CCNC: 165 U/L (ref 85–227)
LYMPHOCYTES # BLD AUTO: 1.9 10E9/L (ref 0.8–5.3)
LYMPHOCYTES NFR BLD AUTO: 34.4 %
MCH RBC QN AUTO: 29.1 PG (ref 26.5–33)
MCHC RBC AUTO-ENTMCNC: 32.1 G/DL (ref 31.5–36.5)
MCV RBC AUTO: 91 FL (ref 78–100)
MONOCYTES # BLD AUTO: 0.4 10E9/L (ref 0–1.3)
MONOCYTES NFR BLD AUTO: 7.7 %
NEUTROPHILS # BLD AUTO: 2.9 10E9/L (ref 1.6–8.3)
NEUTROPHILS NFR BLD AUTO: 53.6 %
NRBC # BLD AUTO: 0 10*3/UL
NRBC BLD AUTO-RTO: 0 /100
PLATELET # BLD AUTO: 213 10E9/L (ref 150–450)
POTASSIUM SERPL-SCNC: 3.9 MMOL/L (ref 3.4–5.3)
PROT SERPL-MCNC: 7.7 G/DL (ref 6.8–8.8)
PSA SERPL-MCNC: 0.42 UG/L (ref 0–4)
RBC # BLD AUTO: 4.88 10E12/L (ref 4.4–5.9)
SODIUM SERPL-SCNC: 137 MMOL/L (ref 133–144)
WBC # BLD AUTO: 5.5 10E9/L (ref 4–11)

## 2020-07-13 PROCEDURE — 80053 COMPREHEN METABOLIC PANEL: CPT | Performed by: INTERNAL MEDICINE

## 2020-07-13 PROCEDURE — 83615 LACTATE (LD) (LDH) ENZYME: CPT | Performed by: INTERNAL MEDICINE

## 2020-07-13 PROCEDURE — 36415 COLL VENOUS BLD VENIPUNCTURE: CPT

## 2020-07-13 PROCEDURE — 84403 ASSAY OF TOTAL TESTOSTERONE: CPT | Performed by: INTERNAL MEDICINE

## 2020-07-13 PROCEDURE — 86316 IMMUNOASSAY TUMOR OTHER: CPT | Performed by: INTERNAL MEDICINE

## 2020-07-13 PROCEDURE — 84153 ASSAY OF PSA TOTAL: CPT | Performed by: INTERNAL MEDICINE

## 2020-07-13 PROCEDURE — 85025 COMPLETE CBC W/AUTO DIFF WBC: CPT | Performed by: INTERNAL MEDICINE

## 2020-07-13 NOTE — PROGRESS NOTES
Walter's admission was not related to his cancer diagnosis. However, he is scheduled to have a Video Visit with Nayla Murray PA-C on 7/16 for his SOC visit and can be assessed at that time for his diverticulitis.     Sarah Medeiros, KMN, RN  RN Care Coordinator  [facility]

## 2020-07-13 NOTE — PROGRESS NOTES
Nursing Note:  Walter Reyes presents today for labs.    Patient seen by provider today: No   present during visit today: Not Applicable.    Note: N/A.    Intravenous Access:  Labs drawn without difficulty.    Discharge Plan:   Patient was sent home    Zulma Hart RN

## 2020-07-15 LAB
CGA SERPL-MCNC: 174 NG/ML (ref 0–160)
TESTOST SERPL-MCNC: 13 NG/DL (ref 240–950)

## 2020-07-16 ENCOUNTER — VIRTUAL VISIT (OUTPATIENT)
Dept: ONCOLOGY | Facility: CLINIC | Age: 60
End: 2020-07-16
Attending: PHYSICIAN ASSISTANT
Payer: COMMERCIAL

## 2020-07-16 DIAGNOSIS — C79.51 MALIGNANT NEOPLASM OF PROSTATE METASTATIC TO BONE (H): Primary | ICD-10-CM

## 2020-07-16 DIAGNOSIS — C61 MALIGNANT NEOPLASM OF PROSTATE METASTATIC TO BONE (H): Primary | ICD-10-CM

## 2020-07-16 PROCEDURE — 99214 OFFICE O/P EST MOD 30 MIN: CPT | Mod: 95 | Performed by: PHYSICIAN ASSISTANT

## 2020-07-16 PROCEDURE — 40001009 ZZH VIDEO/TELEPHONE VISIT; NO CHARGE

## 2020-07-16 RX ORDER — TAMSULOSIN HYDROCHLORIDE 0.4 MG/1
0.4 CAPSULE ORAL DAILY
Qty: 30 CAPSULE | Refills: 3 | Status: SHIPPED | OUTPATIENT
Start: 2020-07-16

## 2020-07-16 RX ORDER — ZOLEDRONIC ACID 0.04 MG/ML
4 INJECTION, SOLUTION INTRAVENOUS ONCE
Status: CANCELLED | OUTPATIENT
Start: 2020-07-16

## 2020-07-16 NOTE — PROGRESS NOTES
"Walter Reyes is a 60 year old male who is being evaluated via a billable video visit.      The patient has been notified of following:     \"This video visit will be conducted via a call between you and your physician/provider. We have found that certain health care needs can be provided without the need for an in-person physical exam.  This service lets us provide the care you need with a video conversation.  If a prescription is necessary we can send it directly to your pharmacy.  If lab work is needed we can place an order for that and you can then stop by our lab to have the test done at a later time.    Video visits are billed at different rates depending on your insurance coverage.  Please reach out to your insurance provider with any questions.    If during the course of the call the physician/provider feels a video visit is not appropriate, you will not be charged for this service.\"    Patient has given verbal consent for Video visit? Yes    How would you like to obtain your AVS? MyChart     Will anyone else be joining your video visit? No          Secondary Video Option (Doximity), send text message to:  [phone]    I have reviewed and updated the patient's allergies and medication list. Patient was asked if they had any patient reported vital signs to present, if yes, please see documented vitals.  Patient was also asked for their current weight and height, if presented, documented in vitals.    Concerns: Patient has no new concerns.      Refills: None      Javid Gamez, EMT          "

## 2020-07-16 NOTE — LETTER
7/16/2020         RE: Walter Reyes  [address] [facility] [address]        Dear Colleague,    Thank you for referring your patient, Walter Reyes, to the [facility]. Please see a copy of my visit note below.    Walter Reyes is a 60 year old male who is being evaluated via a billable video visit.         Secondary Video Option (Doximity), send text message to:  [phone]    I have reviewed and updated the patient's allergies and medication list. Patient was asked if they had any patient reported vital signs to present, if yes, please see documented vitals.  Patient was also asked for their current weight and height, if presented, documented in vitals.    Concerns: Patient has no new concerns.      Refills: None      DULCE Milan            MEDICAL ONCOLOGY FOLLOW-UP NOTE    REFERRING PROVIDER: Jorge Alberto Yepez MD at [facility].    REASON FOR VISIT: Follow-up for metastatic hormone-sensitive prostate cancer, currently on ADT alone.    HISTORY OF PRESENT ILLNESS: Mr. 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 His oncologic history is detailed below.     -Had diverticulitis and was admitted for this. Still on antibiotics, half way done (cipro/flagyl)   -Continues to have diarrhea 1-2x/day. Very loose after starting the antibiotics. No abdominal pain. No pain to palpation. No N/V. On low fiber diet.   -No f/c  -No new pain  -Has urinary urgency and nocturia. Increasing over the last couple months  -No respiratory symptoms. No neuropathy, edema or bleeding    ONCOLOGIC HISTORY:  1. De deja metastatic prostate adenocarcinoma, stage IV (M1b at diagnosis), high-volume, castration-sensitive:  - 12/13/2018: PSA found to be elevated to 9.1 ng/mL on a routine follow-up with  "primary care provider Dr. Naylor at [facility]. Prior PSA were 1.4 on 8/16/17, 2.4 on 6/28/16, 2.9 on 6/28/16, and as low as 0.4 on 3/26/2003.   - 1/08/2019: Consultation with Sarah Wilson CNP in Urology clinic. Repeat PSA 9.6.  - 1/16/2019: MRI prostate with contrast - \"This examination is characterized as PIRADS 5- very high probability. Clinically significant cancer is highly likely to be present. There is a large, invasive mass arising from the right peripheral zone and extending into the neurovascular bundle, seminal vesicle, and along the anterolateral right mesorectal fascia. Metastatic right external iliac lymph node. Metastatic lesion in the posterior/superior right acetabulum.\"  - 1/25/2019: CT abdomen and pelvis with IV contrast - \"1. Heterogeneous enhancing mass posteriorly in the upper pole of the right kidney measures 4.5 x 5.8 x 5.7 cm (AP by transverse by craniocaudal). It has a small nodular component extending posterior medially which abuts the right psoas muscle. This nodular extension measures 2.1 x 2.1 cm. Minimal stranding about the mass. No definite thrombus within the right renal vein. This renal mass is compatible with a renal cell carcinoma. A paraaortic lymph node situated immediately posterior to the left renal vein measures 1.1 cm in short axis, suspicious for metastasis. 2. A 2 cm right external iliac lymph node is also suspicious for metastases. 3. Multiple sclerotic osseous lesions suspicious for metastases. These include 0.8 and 0.6 cm sclerotic lesions laterally in the right iliac wing (images 53 and 62 respectively). Ill-defined groundglass density laterally in the right acetabulum measuring 1.7 x 1.2 cm corresponds with the lesion identified on MRI. A 0.4 cm groundglass density in the left acetabulum. Sclerotic lesion in the left femoral neck measures 0.9 x 1.6 cm (image 81). Sclerotic metastases would be more compatible with prostate metastases. 4. A 3 subcentimeter " "hepatic lesions are indeterminate. Metastases would be a consideration. These can be further characterized with liver MRI.\"  - 1/25/2019: NM bone scan - \"There is focal bony uptake in the left femoral neck, right acetabulum, right of midline at the S1 or L5 level of the spine, within multiple bilateral ribs, in the C7 or T1 level of the spine, and anteriorly within the skull. Findings are suspicious for metastases.\"  - 1/31/19: CT chest with contrast - \" No suspicious nodules in the chest.  Stable appearance of a 5.8 cm right renal mass concerning for renal carcinoma until proven otherwise.  Stable indeterminant subcentimeter hypodensities in the liver.  Multifocal osteoblastic metastasis including 2.5 cm lesion in the right fifth rib posteriorly and a 1.6 cm lesion in the right third rib posteriorly. No lytic lesions identified.\"  - 2/6/19: CT-guided right sclerotic fifth rib lesion biopsy - \"Metastatic carcinoma, consistent with prostate primary.  Immunohistochemical stains performed show the metastatic carcinoma stains positive for NKX3.1 (prostate marker), negative for STACIE 3 and PAX8, which supports the above diagnosis.\"  - 2/15/19: Started Casodex 50mg every day and consented for the biobanking protocol. 3/18/19 - stopped Casodex.  - 2/19/19: Case discussed in tumor board - recommendation for nephectomy for suspected malignant right renal mass.   - 2/26/19: Started Lupron 22.5mg every 3 months.   - 5/8/19: Started Docetaxel 75mg/m2 IV every 3 weeks. 06/18/19 - cycle 3, 7/10/19 - cycle 4, 07/31/19 - cycle 5, 08/21/19 - cycle 6.   - 10/2/19: Restaging CT CAP and NM Bone Scan showed improved osseous disease, no evidence of recurrent or new metastatic disease.  - 1/5/20: Restaging CT CAP and NM Bone Scan showed stable osseous disease, no evidence of recurrent or new metastatic disease.  - 4/6/20: NM bone scan with slightly improved uptake in known skeletal mets. CT C/A/P with contrast with stable osseous mets, no " "yusuf enlargement, no new visceral mets etc.  - 04/08/20: Zometa every 3 months.  - 7/10/20: NM bone scan with worsening lesion at L5. CT CAP without recurrent or new metastatic disease.      2. Stage III ([id number]), grade 3 of 4, clear-cell RCC of right kidney:  - Incidentally diagnosed as above.   - Underwent curative-intent robotic right radical nephrectomy with Dr. Wesley Cleveland on 3/19/19. No tumor spillage per op report.   - Path showed: \"Histologic Type: Clear cell renal cell carcinoma; Sarcomatoid Features: Not identified; Histologic Grade: Nucleolar grade 3 (WHO/ISUP). Extent Tumor Size: 4.3 cm. Microscopic Tumor Extension: Tumor extension into renal sinus (in vascular structures). Margins: Negative. Tumor Necrosis: Present; focal. Lymph-Vascular Invasion: Not identified.  Pathologic Staging (pTNM) Primary Tumor (pT): pT3a: Tumor extends into renal vein branches/renal sinus. Regional Lymph Nodes (pN): pNX. Number of Lymph Nodes Examined: 0 Distant Metastasis (pM): pM N/A.\"  - Restaging scans as above.    PAST MEDICAL HISTORY:  Past Medical History:   Diagnosis Date     Cancer of kidney, right (H)      Complication of anesthesia     slow wakeup      Malignant neoplasm of prostate metastatic to bone (H) 2/14/2019     Thyroid disease      PAST SURGICAL HISTORY:   Past Surgical History:   Procedure Laterality Date     CYSTOSCOPY      about 10 years ago     INSERT PORT VASCULAR ACCESS Right 5/2/2019    Procedure: Chest Port Placement;  Surgeon: Tate Burciaga PA-C;  Location: [facility] OR     IR CHEST PORT PLACEMENT > 5 YRS OF AGE  5/2/2019     LAPAROSCOPIC NEPHRECTOMY Right 3/19/2019    Procedure: Right laparoscopic radical nephrectomy;  Surgeon: Wesley Cleveland MD;  Location:  OR      SOCIAL HISTORY:   Social History     Tobacco Use     Smoking status: Never Smoker     Smokeless tobacco: Never Used   Substance Use Topics     Alcohol use: Not on file     Comment: wine - very rare     Drug use: " No     FAMILY HISTORY:   Family History   Problem Relation Age of Onset     Diabetes Father      ALLERGIES:   Allergies   Allergen Reactions     Doxycycline GI Disturbance     CURRENT MEDICATIONS:   Current Outpatient Medications:      acetaminophen (TYLENOL) 325 MG tablet, Take 2 tablets (650 mg) by mouth every 4 hours as needed for mild pain, Disp:  , Rfl:      amLODIPine (NORVASC) 10 MG tablet, Take 1 tablet (10 mg) by mouth daily, Disp: 30 tablet, Rfl: 0     atorvastatin (LIPITOR) 20 MG tablet, Take 20 mg by mouth daily, Disp: , Rfl:      calcium citrate-vitamin D (CITRACAL) 315-250 MG-UNIT TABS per tablet, Take 650 mg by mouth 2 times daily , Disp: , Rfl:      cetirizine (ZYRTEC) 10 MG tablet, Take 10 mg by mouth as needed for allergies , Disp: , Rfl:      ciprofloxacin (CIPRO) 500 MG tablet, Take 1 tablet (500 mg) by mouth 2 times daily for 12 days, Disp: 24 tablet, Rfl: 0     co-enzyme Q-10 100 MG CAPS capsule, Take 100 mg by mouth daily , Disp: , Rfl:      cycloSPORINE (RESTASIS) 0.05 % ophthalmic emulsion, Place 1 drop into both eyes 2 times daily , Disp: , Rfl:      fluticasone (FLONASE) 50 MCG/ACT nasal spray, Spray 2 sprays in nostril daily as needed , Disp: , Rfl:      Glucosamine-Chondroit-Vit C-Mn (GLUCOSAMINE 1500 COMPLEX) CAPS, Take 1 capsule by mouth daily, Disp: , Rfl:      levothyroxine (SYNTHROID/LEVOTHROID) 100 MCG tablet, Take 100 mcg by mouth daily, Disp: , Rfl:      metroNIDAZOLE (FLAGYL) 500 MG tablet, Take 1 tablet (500 mg) by mouth 3 times daily for 12 days, Disp: 36 tablet, Rfl: 0     Multiple Vitamins-Minerals (MULTIVITAMIN ADULT EXTRA C PO), Take 1 tablet by mouth every 24 hours, Disp: , Rfl:      Nutritional Supplements (SALMON OIL) CAPS, Take 2 capsules by mouth daily , Disp: , Rfl:      omeprazole (PRILOSEC) 20 MG DR capsule, Take 20 mg by mouth daily, Disp: , Rfl:      polyethylene glycol (MIRALAX) 17 g packet, Take 17 g by mouth 2 times daily for 14 days, Disp: 28 packet, Rfl:  0    PHYSICAL EXAMINATION:  VITALS:   Vital Signs 7/11/2020   Systolic 124   Diastolic 75   Pulse 72   Temperature 97.4   Respirations 16   O2 98     ECOG PS 0.    Video physical exam  General: Patient appears well in no acute distress. Normal body habitus.  Skin: No visualized rash or lesions on visualized skin  Eyes: EOMI, no erythema, sclera icterus or discharge noted  Resp: Appears to be breathing comfortably without accessory muscle usage, speaking in full sentences, no cough  MSK: Appears to have normal range of motion based on visualized movements  Neurologic: No apparent tremors, facial movements symmetric  Psych: affect normal, alert and oriented    The rest of a comprehensive physical examination is deferred due to PHE (public health emergency) video restrictions      LABORATORY DATA:      7/13/2020 15:00   Sodium 137   Potassium 3.9   Chloride 105   Carbon Dioxide 24   Urea Nitrogen 16   Creatinine 1.22   GFR Estimate 65   GFR Estimate If Black 75   Calcium 9.3   Anion Gap 8   Albumin 4.0   Protein Total 7.7   Bilirubin Total 0.4   Alkaline Phosphatase 52   ALT 25   AST 18   Chromogranin A 174 (H)   Lactate Dehydrogenase 165   PSA 0.42   Testosterone Total 13 (L)   Glucose 92   WBC 5.5   Hemoglobin 14.2   Hematocrit 44.2   Platelet Count 213   RBC Count 4.88   MCV 91   MCH 29.1   MCHC 32.1   RDW 13.1   Diff Method Automated Method   % Neutrophils 53.6   % Lymphocytes 34.4   % Monocytes 7.7   % Eosinophils 3.6   % Basophils 0.5   % Immature Granulocytes 0.2   Nucleated RBCs 0   Absolute Neutrophil 2.9   Absolute Lymphocytes 1.9   Absolute Monocytes 0.4   Absolute Eosinophils 0.2   Absolute Basophils 0.0   Abs Immature Granulocytes 0.0   Absolute Nucleated RBC 0.0      1/6/2020 08:58 4/8/2020 09:30 7/13/2020 15:00   PSA 0.44 0.41 0.42       IMAGING:  CT CAP dated 7/10/20  IMPRESSION:  In this patient with a history of prostate cancer and right renal cell  carcinoma status post right nephrectomy:  1. Acute  "diverticulitis, likely uncomplicated, of the sigmoid colon  with prominent inflammatory changes surrounding the colon and a tiny  amount of free air. No evidence for abscess although sensitivity for  abscess on this noncontrast CT as well.  2. No evidence of intrathoracic or intra-abdominal metastatic disease.  3. No significant change in the osseous metastatic disease. Recommend  follow-up with same day nuclear medicine bone scan.  4. Moderate to large hiatal hernia.    NM Bone Scan dated 7/10/20  IMPRESSION:   1. Increased right L5 lamina metastatic lesion, was smaller on  4/6/2020 CT, now also visible on bone scan.    2. No new lesions, otherwise stable foci of osseous metastatic  disease.     ASSESSMENT & PLAN: Mr. Reyes is a delightful 60 year old gentleman with recently diagnosed metastatic castration sensitive prostate adenocarcinoma as well as an incidentally diagnosed stage III clear-cell renal cell carcinoma of the right kidney (s/p radical R nephrectomy 3/19/19), who is here for a follow-up visit after completion of docetaxel for prostate cancer.     1. Metastatic prostate cancer, with involvement of the bones and RPLN:   - Patient met the criteria for CHAARTED \"high-volume\" metastatic hormone-sensitive prostate cancer. He completed 6 cycles of docetaxel in combination with ADT (per CHAARTED, [id number], STAMPEDE).   -Reviewed imaging today. Restaging CT CAP from 7/10/20 showed no evidence of recurrence or further metastatic disease, though NM Bone scan did demonstrate worsening likely metastatic lesion at L5. PSA has been very stable (0.41-> 0.42). Discussed with Dr. Smalls. No need for systemic therapy at this time with stable PSA. He will discuss with Rad Onc about possible radiation to this area.   -will continue with Lupron and Zometa alone  -will have him follow-up with Dr. Smalls in 3 months, sooner if needed    - Continue Lupron 22.5mg every 3 months, last dose on 4/8 - next dose " tomorrow    2. Bone metastases:   -currently on Zometa, started on 4/8/20. Plan for a total of 2 years. Next due tomorrow. Continue every 3 months   - Encouraged to continue calcium and vitamin D supplementation.    3. Stage 3, UISS-high risk ccRCC:  - no clear evidence of residual disease. The retroperitoneal lymphadenopathy is more consistent with metastatic prostate cancer  -Previously had bladder dome CT finding. Dr. Cleveland was not concerned though felt a cytoscopy could be appropriate. Not noted on CT from last week  - Continue active surveillance with self-reporting for signs/symptoms of recurrence and periodic H&P and scans.      4. Genetics referral:  - Was referred and seen by Genetics on 3/7/19. No evidence of inherited cancer syndromes found on work-up.     5.   -having progressively worsening urgency and nocturia. Will start him on Flomax 0.4 mg daily. Can increase if needed    6. Diverticulitis   -noted on CT from 7/10 and was admitted overnight. Discharged with Flagyl and Cipro, to complete 10 days of abx (done ~ 7/23). Started having diarrhea after staring antibiotics. Has had resolution of abdominal pain. Likely from antibiotics. If diarrhea does not resolve after stopping, should follow-up with PCP and r/o C.diff. Should also call them if pain were to return or stools were to become very frequent   -Continue on low fiber diet    Video-Visit Details    Type of service:  Video Visit    Video Start Time: 10:10 AM  Video End Time: 10:36 AM    Originating Location (pt. Location): Home    Distant Location (provider location):  [facility]     Platform used for Video Visit: Kinza Murray PA-C

## 2020-07-17 ENCOUNTER — INFUSION THERAPY VISIT (OUTPATIENT)
Dept: INFUSION THERAPY | Facility: CLINIC | Age: 60
End: 2020-07-17
Attending: PHYSICIAN ASSISTANT
Payer: COMMERCIAL

## 2020-07-17 VITALS
OXYGEN SATURATION: 96 % | SYSTOLIC BLOOD PRESSURE: 107 MMHG | RESPIRATION RATE: 16 BRPM | TEMPERATURE: 98.2 F | HEART RATE: 60 BPM | DIASTOLIC BLOOD PRESSURE: 73 MMHG

## 2020-07-17 DIAGNOSIS — C79.51 MALIGNANT NEOPLASM OF PROSTATE METASTATIC TO BONE (H): Primary | ICD-10-CM

## 2020-07-17 DIAGNOSIS — C61 MALIGNANT NEOPLASM OF PROSTATE METASTATIC TO BONE (H): Primary | ICD-10-CM

## 2020-07-17 PROCEDURE — 25000128 H RX IP 250 OP 636: Performed by: INTERNAL MEDICINE

## 2020-07-17 PROCEDURE — 96402 CHEMO HORMON ANTINEOPL SQ/IM: CPT

## 2020-07-17 PROCEDURE — 96365 THER/PROPH/DIAG IV INF INIT: CPT

## 2020-07-17 PROCEDURE — 25000128 H RX IP 250 OP 636: Performed by: PHYSICIAN ASSISTANT

## 2020-07-17 RX ORDER — HEPARIN SODIUM,PORCINE 10 UNIT/ML
5 VIAL (ML) INTRAVENOUS
Status: CANCELLED | OUTPATIENT
Start: 2020-10-14

## 2020-07-17 RX ORDER — HEPARIN SODIUM (PORCINE) LOCK FLUSH IV SOLN 100 UNIT/ML 100 UNIT/ML
5 SOLUTION INTRAVENOUS
Status: DISCONTINUED | OUTPATIENT
Start: 2020-07-17 | End: 2020-07-17 | Stop reason: HOSPADM

## 2020-07-17 RX ORDER — HEPARIN SODIUM (PORCINE) LOCK FLUSH IV SOLN 100 UNIT/ML 100 UNIT/ML
5 SOLUTION INTRAVENOUS
Status: CANCELLED | OUTPATIENT
Start: 2020-10-14

## 2020-07-17 RX ORDER — ZOLEDRONIC ACID 0.04 MG/ML
4 INJECTION, SOLUTION INTRAVENOUS ONCE
Status: CANCELLED | OUTPATIENT
Start: 2020-10-14

## 2020-07-17 RX ORDER — ZOLEDRONIC ACID 0.04 MG/ML
4 INJECTION, SOLUTION INTRAVENOUS ONCE
Status: COMPLETED | OUTPATIENT
Start: 2020-07-17 | End: 2020-07-17

## 2020-07-17 RX ADMIN — Medication 5 ML: at 13:51

## 2020-07-17 RX ADMIN — LEUPROLIDE ACETATE 22.5 MG: KIT at 13:55

## 2020-07-17 RX ADMIN — ZOLEDRONIC ACID 4 MG: 0.04 INJECTION, SOLUTION INTRAVENOUS at 13:34

## 2020-07-17 ASSESSMENT — PAIN SCALES - GENERAL: PAINLEVEL: NO PAIN (0)

## 2020-07-17 NOTE — PROGRESS NOTES
Infusion Nursing Note:  Walter Reyes presents today for Lupron and Zometa.    Patient seen by provider today: No   present during visit today: Not Applicable.    Note: patient doesn't usually get his port flushed except when he comes every 3 months for zometa/lupron, Says port has been working fine and declined every 6 week flushes..    Intravenous Access:  Implanted Port.    Treatment Conditions:  Lab Results   Component Value Date     07/13/2020                   Lab Results   Component Value Date    POTASSIUM 3.9 07/13/2020           No results found for: MAG         Lab Results   Component Value Date    CR 1.22 07/13/2020                   Lab Results   Component Value Date    BETHANY 9.3 07/13/2020                Lab Results   Component Value Date    BILITOTAL 0.4 07/13/2020           Lab Results   Component Value Date    ALBUMIN 4.0 07/13/2020                    Lab Results   Component Value Date    ALT 25 07/13/2020           Lab Results   Component Value Date    AST 18 07/13/2020       Results reviewed, labs MET treatment parameters, ok to proceed with treatment.      Post Infusion Assessment:  Patient tolerated infusion without incident.  Patient tolerated injection to Left gluteus oliva without incident.  Blood return noted pre and post infusion.  Site patent and intact, free from redness, edema or discomfort.  No evidence of extravasations.  Access discontinued per protocol.       Discharge Plan:   Patient declined prescription refills.  Copy of AVS reviewed with patient and/or family.  Patient stated he will call to make next appointment.  Patient discharged in stable condition accompanied by: self.  Departure Mode: Ambulatory.    Cyndy Bruce RN

## 2020-08-04 ENCOUNTER — TELEPHONE (OUTPATIENT)
Dept: PODIATRY | Facility: CLINIC | Age: 60
End: 2020-08-04

## 2020-08-04 ENCOUNTER — OFFICE VISIT (OUTPATIENT)
Dept: PODIATRY | Facility: CLINIC | Age: 60
End: 2020-08-04
Payer: COMMERCIAL

## 2020-08-04 VITALS
DIASTOLIC BLOOD PRESSURE: 68 MMHG | WEIGHT: 237 LBS | HEIGHT: 72 IN | BODY MASS INDEX: 32.1 KG/M2 | SYSTOLIC BLOOD PRESSURE: 110 MMHG

## 2020-08-04 DIAGNOSIS — Q66.70 PES CAVUS: ICD-10-CM

## 2020-08-04 DIAGNOSIS — G57.62 MORTON'S NEUROMA, LEFT: ICD-10-CM

## 2020-08-04 DIAGNOSIS — M79.671 FOOT PAIN, BILATERAL: Primary | ICD-10-CM

## 2020-08-04 DIAGNOSIS — M76.71 PERONEAL TENDONITIS, RIGHT: ICD-10-CM

## 2020-08-04 DIAGNOSIS — M79.672 FOOT PAIN, BILATERAL: Primary | ICD-10-CM

## 2020-08-04 DIAGNOSIS — M72.2 PLANTAR FASCIITIS, RIGHT: ICD-10-CM

## 2020-08-04 PROCEDURE — 99203 OFFICE O/P NEW LOW 30 MIN: CPT | Performed by: PODIATRIST

## 2020-08-04 RX ORDER — DEXAMETHASONE SODIUM PHOSPHATE 4 MG/ML
4 INJECTION, SOLUTION INTRA-ARTICULAR; INTRALESIONAL; INTRAMUSCULAR; INTRAVENOUS; SOFT TISSUE ONCE
Qty: 30 ML | Refills: 0 | Status: SHIPPED | OUTPATIENT
Start: 2020-08-04 | End: 2021-07-28

## 2020-08-04 ASSESSMENT — MIFFLIN-ST. JEOR: SCORE: 1933.02

## 2020-08-04 NOTE — PROGRESS NOTES
PATIENT HISTORY:  Walter Reyes is a 60 year old male who presents to clinic for pain to both feet.  He notes is been going on for 7 months.  Pain to the right heel inside of the foot as well as ball of the left foot.  Very sore in the morning.  Also painful with activity.  He is tried icing, orthotics, night splints.  They help a little bit.  He has had this for years on and off.  Pain at its worst can be a 10.  Denies specific injury.  Wondering what can be done to help with the heel pain.    Review of Systems:  Patient denies fever, chills, rash, wound, stiffness,  numbness, weakness, heart burn, blood in stool, chest pain with activity, calf pain when walking, shortness of breath with activity, chronic cough, easy bleeding/bruising, swelling of ankles, excessive thirst, fatigue, depression, anxiety.  Patient admits to limping at times..     PAST MEDICAL HISTORY:   Past Medical History:   Diagnosis Date     Cancer of kidney, right (H)      Complication of anesthesia     slow wakeup      Malignant neoplasm of prostate metastatic to bone (H) 2/14/2019     Thyroid disease         PAST SURGICAL HISTORY:   Past Surgical History:   Procedure Laterality Date     CYSTOSCOPY      about 10 years ago     INSERT PORT VASCULAR ACCESS Right 5/2/2019    Procedure: Chest Port Placement;  Surgeon: Tate Burciaga PA-C;  Location: [facility]     IR CHEST PORT PLACEMENT > 5 YRS OF AGE  5/2/2019     LAPAROSCOPIC NEPHRECTOMY Right 3/19/2019    Procedure: Right laparoscopic radical nephrectomy;  Surgeon: Wesley Cleveland MD;  Location:  OR        MEDICATIONS:   Current Outpatient Medications:      amLODIPine (NORVASC) 10 MG tablet, Take 1 tablet (10 mg) by mouth daily, Disp: 30 tablet, Rfl: 0     atorvastatin (LIPITOR) 20 MG tablet, Take 20 mg by mouth daily, Disp: , Rfl:      calcium citrate-vitamin D (CITRACAL) 315-250 MG-UNIT TABS per tablet, Take 650 mg by mouth 2 times daily , Disp: , Rfl:      cetirizine  (ZYRTEC) 10 MG tablet, Take 10 mg by mouth as needed for allergies , Disp: , Rfl:      co-enzyme Q-10 100 MG CAPS capsule, Take 100 mg by mouth daily , Disp: , Rfl:      cycloSPORINE (RESTASIS) 0.05 % ophthalmic emulsion, Place 1 drop into both eyes 2 times daily , Disp: , Rfl:      fluticasone (FLONASE) 50 MCG/ACT nasal spray, Spray 2 sprays in nostril daily as needed , Disp: , Rfl:      Glucosamine-Chondroit-Vit C-Mn (GLUCOSAMINE 1500 COMPLEX) CAPS, Take 1 capsule by mouth daily, Disp: , Rfl:      levothyroxine (SYNTHROID/LEVOTHROID) 100 MCG tablet, Take 100 mcg by mouth daily, Disp: , Rfl:      Multiple Vitamins-Minerals (MULTIVITAMIN ADULT EXTRA C PO), Take 1 tablet by mouth every 24 hours, Disp: , Rfl:      Nutritional Supplements (SALMON OIL) CAPS, Take 2 capsules by mouth daily , Disp: , Rfl:      omeprazole (PRILOSEC) 20 MG DR capsule, Take 20 mg by mouth daily, Disp: , Rfl:      tamsulosin (FLOMAX) 0.4 MG capsule, Take 1 capsule (0.4 mg) by mouth daily, Disp: 30 capsule, Rfl: 3     ALLERGIES:    Allergies   Allergen Reactions     Doxycycline GI Disturbance        SOCIAL HISTORY:   Social History     Socioeconomic History     Marital status:      Spouse name: Not on file     Number of children: Not on file     Years of education: Not on file     Highest education level: Not on file   Occupational History     Not on file   Social Needs     Financial resource strain: Not on file     Food insecurity     Worry: Not on file     Inability: Not on file     Transportation needs     Medical: Not on file     Non-medical: Not on file   Tobacco Use     Smoking status: Never Smoker     Smokeless tobacco: Never Used   Substance and Sexual Activity     Alcohol use: Not on file     Comment: wine - very rare     Drug use: No     Sexual activity: Not on file   Lifestyle     Physical activity     Days per week: Not on file     Minutes per session: Not on file     Stress: Not on file   Relationships     Social  connections     Talks on phone: Not on file     Gets together: Not on file     Attends Christianity service: Not on file     Active member of club or organization: Not on file     Attends meetings of clubs or organizations: Not on file     Relationship status: Not on file     Intimate partner violence     Fear of current or ex partner: Not on file     Emotionally abused: Not on file     Physically abused: Not on file     Forced sexual activity: Not on file   Other Topics Concern     Parent/sibling w/ CABG, MI or angioplasty before 65F 55M? Not Asked   Social History Narrative     Not on file        FAMILY HISTORY:   Family History   Problem Relation Age of Onset     Diabetes Father         EXAM:Vitals: /68   Ht 1.829 m (6')   Wt 107.5 kg (237 lb)   BMI 32.14 kg/m    BMI= Body mass index is 32.14 kg/m .    General appearance: Patient is alert and fully cooperative with history & exam.  No sign of distress is noted during the visit.     Psychiatric: Affect is pleasant & appropriate.  Patient appears motivated to improve health.     Respiratory: Breathing is regular & unlabored while sitting.     HEENT: Hearing is intact to spoken word.  Speech is clear.  No gross evidence of visual impairment that would impact ambulation.     Dermatologic: Skin is intact to both lower extremities without significant lesions, rash or abrasion.  No paronychia or evidence of soft tissue infection is noted.     Vascular: DP & PT pulses are intact & regular bilaterally.  No significant edema or varicosities noted.  CFT and skin temperature is normal to both lower extremities.     Neurologic: Lower extremity sensation is intact to light touch.  No evidence of weakness or contracture in the lower extremities.  No evidence of neuropathy.     Musculoskeletal: Patient is ambulatory without assistive device or brace.  Pain on palpation of the right plantar heel.  He also has pain along the peroneal tendon of the right foot.  Increased arch  height both feet.       ASSESSMENT:    Foot pain, bilateral  Pes cavus  Plantar fasciitis, right  Peroneal tendonitis, right  Acosta's neuroma, left     PLAN:  Reviewed patient's chart in [facility]. The potential causes and nature of plantar fasciitis were discussed with the patient.  We reviewed the natural history/prognosis of the condition and risks if left untreated.  These include chronic pain, other sites of pain due to gait changes, and potential plantar fascial rupture.      We discussed possible causes of the condition as it relates to the patients specific situation.      Conservative treatment options were reviewed:  appropriate shoes, avoidance of barefoot walking, inserts/orthoses, stretching, ice, massage, immobilization and NSAIDs.     We also reviewed the options of injection therapy and surgery.  However, it was made clear that surgery is only considered when conservative therapy fails.  The risks and benefits of injection therapy, and surgery were discussed.     Reviewed and discussed causes of tendonitis.  We discussed treatments such as immobiliation, icing, stretching, heel lifts, orthotics, physical therapy, MRI.      At this time I recommend physical therapy with iontophoresis.  Continue to wear his inserts that he just got and continue to do the stretches.  Talked about over-the-counter topical pain cream as well.  If pain continues would recommend an MRI of the right ankle.     We discussed causes and treatments of neuromas.  These included shoe gear, orthotics, metatarsal pads, cortisone injections, ice, physical therapy, and possible surgical removal.  Patient would like to try physical therapy for this as well.  Declined injection at this time.  All questions were answered to patient satisfaction he will call further questions or concerns.      Surekha Zaragoza DPM, Podiatry/Foot and Ankle Surgery    Weight management plan: Patient was referred to their PCP to discuss a diet and exercise  plan.    Recommended to Walter Reyes to follow up with Primary Care provider regarding elevated blood pressure.

## 2020-08-04 NOTE — LETTER
8/4/2020         RE: Walter Reyes  [address] [facility] [address]        Dear Colleague,    Thank you for referring your patient, Walter Reyes, to the [facility]. Please see a copy of my visit note below.    PATIENT HISTORY:  Walter Reyes is a 60 year old male who presents to clinic for pain to both feet.  He notes is been going on for 7 months.  Pain to the right heel inside of the foot as well as ball of the left foot.  Very sore in the morning.  Also painful with activity.  He is tried icing, orthotics, night splints.  They help a little bit.  He has had this for years on and off.  Pain at its worst can be a 10.  Denies specific injury.  Wondering what can be done to help with the heel pain.    Review of Systems:  Patient denies fever, chills, rash, wound, stiffness,  numbness, weakness, heart burn, blood in stool, chest pain with activity, calf pain when walking, shortness of breath with activity, chronic cough, easy bleeding/bruising, swelling of ankles, excessive thirst, fatigue, depression, anxiety.  Patient admits to limping at times..     PAST MEDICAL HISTORY:   Past Medical History:   Diagnosis Date     Cancer of kidney, right (H)      Complication of anesthesia     slow wakeup      Malignant neoplasm of prostate metastatic to bone (H) 2/14/2019     Thyroid disease         PAST SURGICAL HISTORY:   Past Surgical History:   Procedure Laterality Date     CYSTOSCOPY      about 10 years ago     INSERT PORT VASCULAR ACCESS Right 5/2/2019    Procedure: Chest Port Placement;  Surgeon: Tate Burciaga PA-C;  Location: [facility] OR     IR CHEST PORT PLACEMENT > 5 YRS OF AGE  5/2/2019     LAPAROSCOPIC NEPHRECTOMY Right 3/19/2019    Procedure: Right laparoscopic radical nephrectomy;  Surgeon: Wesley Cleveland MD;  Location:  OR        MEDICATIONS:   Current Outpatient Medications:      amLODIPine (NORVASC) 10 MG tablet, Take 1 tablet (10 mg)  by mouth daily, Disp: 30 tablet, Rfl: 0     atorvastatin (LIPITOR) 20 MG tablet, Take 20 mg by mouth daily, Disp: , Rfl:      calcium citrate-vitamin D (CITRACAL) 315-250 MG-UNIT TABS per tablet, Take 650 mg by mouth 2 times daily , Disp: , Rfl:      cetirizine (ZYRTEC) 10 MG tablet, Take 10 mg by mouth as needed for allergies , Disp: , Rfl:      co-enzyme Q-10 100 MG CAPS capsule, Take 100 mg by mouth daily , Disp: , Rfl:      cycloSPORINE (RESTASIS) 0.05 % ophthalmic emulsion, Place 1 drop into both eyes 2 times daily , Disp: , Rfl:      fluticasone (FLONASE) 50 MCG/ACT nasal spray, Spray 2 sprays in nostril daily as needed , Disp: , Rfl:      Glucosamine-Chondroit-Vit C-Mn (GLUCOSAMINE 1500 COMPLEX) CAPS, Take 1 capsule by mouth daily, Disp: , Rfl:      levothyroxine (SYNTHROID/LEVOTHROID) 100 MCG tablet, Take 100 mcg by mouth daily, Disp: , Rfl:      Multiple Vitamins-Minerals (MULTIVITAMIN ADULT EXTRA C PO), Take 1 tablet by mouth every 24 hours, Disp: , Rfl:      Nutritional Supplements (SALMON OIL) CAPS, Take 2 capsules by mouth daily , Disp: , Rfl:      omeprazole (PRILOSEC) 20 MG DR capsule, Take 20 mg by mouth daily, Disp: , Rfl:      tamsulosin (FLOMAX) 0.4 MG capsule, Take 1 capsule (0.4 mg) by mouth daily, Disp: 30 capsule, Rfl: 3     ALLERGIES:    Allergies   Allergen Reactions     Doxycycline GI Disturbance        SOCIAL HISTORY:   Social History     Socioeconomic History     Marital status:      Spouse name: Not on file     Number of children: Not on file     Years of education: Not on file     Highest education level: Not on file   Occupational History     Not on file   Social Needs     Financial resource strain: Not on file     Food insecurity     Worry: Not on file     Inability: Not on file     Transportation needs     Medical: Not on file     Non-medical: Not on file   Tobacco Use     Smoking status: Never Smoker     Smokeless tobacco: Never Used   Substance and Sexual Activity      Alcohol use: Not on file     Comment: wine - very rare     Drug use: No     Sexual activity: Not on file   Lifestyle     Physical activity     Days per week: Not on file     Minutes per session: Not on file     Stress: Not on file   Relationships     Social connections     Talks on phone: Not on file     Gets together: Not on file     Attends Catholic service: Not on file     Active member of club or organization: Not on file     Attends meetings of clubs or organizations: Not on file     Relationship status: Not on file     Intimate partner violence     Fear of current or ex partner: Not on file     Emotionally abused: Not on file     Physically abused: Not on file     Forced sexual activity: Not on file   Other Topics Concern     Parent/sibling w/ CABG, MI or angioplasty before 65F 55M? Not Asked   Social History Narrative     Not on file        FAMILY HISTORY:   Family History   Problem Relation Age of Onset     Diabetes Father         EXAM:Vitals: /68   Ht 1.829 m (6')   Wt 107.5 kg (237 lb)   BMI 32.14 kg/m    BMI= Body mass index is 32.14 kg/m .    General appearance: Patient is alert and fully cooperative with history & exam.  No sign of distress is noted during the visit.     Psychiatric: Affect is pleasant & appropriate.  Patient appears motivated to improve health.     Respiratory: Breathing is regular & unlabored while sitting.     HEENT: Hearing is intact to spoken word.  Speech is clear.  No gross evidence of visual impairment that would impact ambulation.     Dermatologic: Skin is intact to both lower extremities without significant lesions, rash or abrasion.  No paronychia or evidence of soft tissue infection is noted.     Vascular: DP & PT pulses are intact & regular bilaterally.  No significant edema or varicosities noted.  CFT and skin temperature is normal to both lower extremities.     Neurologic: Lower extremity sensation is intact to light touch.  No evidence of weakness or  contracture in the lower extremities.  No evidence of neuropathy.     Musculoskeletal: Patient is ambulatory without assistive device or brace.  Pain on palpation of the right plantar heel.  He also has pain along the peroneal tendon of the right foot.  Increased arch height both feet.       ASSESSMENT:    Foot pain, bilateral  Pes cavus  Plantar fasciitis, right  Peroneal tendonitis, right  Acosta's neuroma, left     PLAN:  Reviewed patient's chart in [facility]. The potential causes and nature of plantar fasciitis were discussed with the patient.  We reviewed the natural history/prognosis of the condition and risks if left untreated.  These include chronic pain, other sites of pain due to gait changes, and potential plantar fascial rupture.      We discussed possible causes of the condition as it relates to the patients specific situation.      Conservative treatment options were reviewed:  appropriate shoes, avoidance of barefoot walking, inserts/orthoses, stretching, ice, massage, immobilization and NSAIDs.     We also reviewed the options of injection therapy and surgery.  However, it was made clear that surgery is only considered when conservative therapy fails.  The risks and benefits of injection therapy, and surgery were discussed.     Reviewed and discussed causes of tendonitis.  We discussed treatments such as immobiliation, icing, stretching, heel lifts, orthotics, physical therapy, MRI.      At this time I recommend physical therapy with iontophoresis.  Continue to wear his inserts that he just got and continue to do the stretches.  Talked about over-the-counter topical pain cream as well.  If pain continues would recommend an MRI of the right ankle.     We discussed causes and treatments of neuromas.  These included shoe gear, orthotics, metatarsal pads, cortisone injections, ice, physical therapy, and possible surgical removal.  Patient would like to try physical therapy for this as well.  Declined  injection at this time.  All questions were answered to patient satisfaction he will call further questions or concerns.      Surekha Zaragoza DPM, Podiatry/Foot and Ankle Surgery    Weight management plan: Patient was referred to their PCP to discuss a diet and exercise plan.    Recommended to Walter Reyes to follow up with Primary Care provider regarding elevated blood pressure.        Again, thank you for allowing me to participate in the care of your patient.        Sincerely,        Surekha Zaragoza DPM, Podiatry/Foot and Ankle Surgery

## 2020-08-04 NOTE — TELEPHONE ENCOUNTER
Prior Authorization Retail Medication Request    Medication/Dose: dexamethasone 4 mg injection  ICD code (if different than what is on RX):    Previously Tried and Failed:    Rationale:  For physical therapy, iontophoresis     Insurance Name:  [facility]  Insurance ID:  [id number]       Pharmacy Information (if different than what is on RX)  Name:    Phone:      Cover My Meds:  Key: [id number]  PATIENT LAST NAME: LUPILLO  : 1960      Breanna CROWLEY CMA

## 2020-08-05 NOTE — TELEPHONE ENCOUNTER
Return call to patient. Informed him of denial and Dr. Zaragoza's medications. He verbalized understanding and will look into the out of pocket pricing of the dexamethasone as well. Instructed patient to call with any questions or concerns. All questions answered.    Holly Soria, ATC

## 2020-08-05 NOTE — TELEPHONE ENCOUNTER
Ultrasound and TENS was also ordered with physical therapy so I would still recommend physical therapy just not with iontophoresis.  Also he can continue to us over the counter voltaren gel for pain cream to feet.     Please let patient know.     Surekha Zaragoza DPM

## 2020-08-05 NOTE — TELEPHONE ENCOUNTER
Central Prior Authorization Team   Phone: [phone]    PA Initiation    Medication: dexamethasone-Initiated  Insurance Company: [employer] - Phone [phone] Fax [phone]  Pharmacy Filling the Rx: [id number] - [place], [place] - [address]  Filling Pharmacy Phone: [phone]  Filling Pharmacy Fax:    Start Date: 8/5/2020

## 2020-08-05 NOTE — TELEPHONE ENCOUNTER
PRIOR AUTHORIZATION DENIED    Medication: dexamethasone-DENIED    Denial Date: 8/5/2020    Denial Rational: Plan exclusion, not covered under patient's benefit plan.        Appeal Information:

## 2020-08-06 ENCOUNTER — THERAPY VISIT (OUTPATIENT)
Dept: PHYSICAL THERAPY | Facility: CLINIC | Age: 60
End: 2020-08-06
Attending: PODIATRIST
Payer: COMMERCIAL

## 2020-08-06 DIAGNOSIS — M79.671 FOOT PAIN, BILATERAL: ICD-10-CM

## 2020-08-06 DIAGNOSIS — Q66.70 PES CAVUS: ICD-10-CM

## 2020-08-06 DIAGNOSIS — M76.71 PERONEAL TENDONITIS, RIGHT: ICD-10-CM

## 2020-08-06 DIAGNOSIS — G57.62 MORTON'S NEUROMA, LEFT: ICD-10-CM

## 2020-08-06 DIAGNOSIS — M79.672 FOOT PAIN, BILATERAL: ICD-10-CM

## 2020-08-06 DIAGNOSIS — M72.2 PLANTAR FASCIITIS, RIGHT: ICD-10-CM

## 2020-08-06 PROCEDURE — 97110 THERAPEUTIC EXERCISES: CPT | Mod: GP | Performed by: PHYSICAL THERAPIST

## 2020-08-06 PROCEDURE — 97033 APP MDLTY 1+IONTPHRSIS EA 15: CPT | Mod: GP | Performed by: PHYSICAL THERAPIST

## 2020-08-06 PROCEDURE — 97140 MANUAL THERAPY 1/> REGIONS: CPT | Mod: GP | Performed by: PHYSICAL THERAPIST

## 2020-08-06 PROCEDURE — 97162 PT EVAL MOD COMPLEX 30 MIN: CPT | Mod: GP | Performed by: PHYSICAL THERAPIST

## 2020-08-06 NOTE — PROGRESS NOTES
[facility] Initial Evaluation  Subjective:  R plantar fascitis Jan 2020 after running on treadmill. Has had in past but able to control with injection/inserts/stretching, etc. This time not going away. WORSE in morning, 1st few steps after sitting, walking, icing.  Also some clicking in posterior heel and 5th met base. Walking tolerance b/t 1 block to 1.5 miles, yard work. Able to spin bike (sitting only), calf stretches regularly, roll with foam roller, lacrosse ball. Prostate/kidney CA 2019 (chemo for prostate finished up 1 year ago), and Lupron.  Also some neuropathy since chemo, mostly numbness in toes.    Also some L distal forefoot pain in morning when puts pressure on it for ~ 30 seconds. Then goes away. Not too concerning for patient.     The history is provided by the patient. No  was used.   Patient Health History         Pain is reported as 4/10 on pain scale.  General health as reported by patient is fair.  Pertinent medical history includes: cancer, high blood pressure and overweight.     Medical allergies: none.   Surgeries include:  Cancer surgery. Other surgery history details: kidney removed, prostate chemo 2019.    Current medications:  Bone density, heparin/coumadin, high blood pressure medication and thyroid medication (Lupron).    Current occupation .                                       Objective:    Gait:    Gait Type:  Antalgic     Deviations:  Ankle:  Push off decr R          Ankle/Foot Evaluation  ROM:  AROM is normal.PROM is normal.  AROM:              Great Toe Extension:  Left:  ~45     Right: ~60    Strength wnl ankle: Unable to do more than 2 reps of SL toe raise on R., L x5.       PALPATION: Palpation of ankle: L WNL, TTP R posterior/plantar heel.    Right ankle tenderness present at:   plantar fascia and medial calcaneal                                                        General     ROS    Assessment/Plan:    Patient is  a 60 year old male with both sides ankle complaints.    Patient has the following significant findings with corresponding treatment plan.                Diagnosis 1:  R plantar fascitis/L hess's neuroma  Pain -  hot/cold therapy, US, manual therapy, splint/taping/bracing/orthotics, self management, education, home program and iontophoresis  Decreased ROM/flexibility - manual therapy and therapeutic exercise  Decreased joint mobility - manual therapy and therapeutic exercise  Decreased strength - therapeutic exercise and therapeutic activities  Decreased proprioception - neuro re-education, gait training and therapeutic activities  Inflammation - US, iontophoresis and self management/home program  Impaired gait - gait training  Impaired muscle performance - neuro re-education  Decreased function - therapeutic activities    Therapy Evaluation Codes:   1) History comprised of:   Personal factors that impact the plan of care:      Time since onset of symptoms.    Comorbidity factors that impact the plan of care are:      Cancer and Overweight.     Medications impacting care: High blood pressure, Heparin/coumadin and thyroid.  2) Examination of Body Systems comprised of:   Body structures and functions that impact the plan of care:      Ankle.   Activity limitations that impact the plan of care are:      Running, Sports, Standing and Walking.  3) Clinical presentation characteristics are:   Evolving/Changing.  4) Decision-Making    Moderate complexity using standardized patient assessment instrument and/or measureable assessment of functional outcome.  Cumulative Therapy Evaluation is: Moderate complexity.    Previous and current functional limitations:  (See Goal Flow Sheet for this information)    Short term and Long term goals: (See Goal Flow Sheet for this information)     Communication ability:  Patient appears to be able to clearly communicate and understand verbal and written communication and follow directions  correctly.  Treatment Explanation - The following has been discussed with the patient:   RX ordered/plan of care  Anticipated outcomes  Possible risks and side effects  This patient would benefit from PT intervention to resume normal activities.   Rehab potential is good.    Frequency:  1 X week, once daily  Duration:  for 8 weeks  Discharge Plan:  Achieve all LTG.  Independent in home treatment program.  Reach maximal therapeutic benefit.    Please refer to the daily flowsheet for treatment today, total treatment time and time spent performing 1:1 timed codes.

## 2020-08-12 ENCOUNTER — THERAPY VISIT (OUTPATIENT)
Dept: PHYSICAL THERAPY | Facility: CLINIC | Age: 60
End: 2020-08-12
Payer: COMMERCIAL

## 2020-08-12 DIAGNOSIS — M72.2 PLANTAR FASCIITIS, RIGHT: ICD-10-CM

## 2020-08-12 DIAGNOSIS — M76.71 PERONEAL TENDONITIS, RIGHT: ICD-10-CM

## 2020-08-12 DIAGNOSIS — M79.672 FOOT PAIN, BILATERAL: ICD-10-CM

## 2020-08-12 DIAGNOSIS — M79.671 FOOT PAIN, BILATERAL: ICD-10-CM

## 2020-08-12 DIAGNOSIS — G57.62 MORTON'S NEUROMA, LEFT: ICD-10-CM

## 2020-08-12 PROCEDURE — 97033 APP MDLTY 1+IONTPHRSIS EA 15: CPT | Mod: GP | Performed by: PHYSICAL THERAPIST

## 2020-08-12 PROCEDURE — 97140 MANUAL THERAPY 1/> REGIONS: CPT | Mod: GP | Performed by: PHYSICAL THERAPIST

## 2020-08-12 PROCEDURE — 97110 THERAPEUTIC EXERCISES: CPT | Mod: GP | Performed by: PHYSICAL THERAPIST

## 2020-08-19 ENCOUNTER — THERAPY VISIT (OUTPATIENT)
Dept: PHYSICAL THERAPY | Facility: CLINIC | Age: 60
End: 2020-08-19
Payer: COMMERCIAL

## 2020-08-19 DIAGNOSIS — M79.671 FOOT PAIN, BILATERAL: ICD-10-CM

## 2020-08-19 DIAGNOSIS — M72.2 PLANTAR FASCIITIS, RIGHT: ICD-10-CM

## 2020-08-19 DIAGNOSIS — M79.672 FOOT PAIN, BILATERAL: ICD-10-CM

## 2020-08-19 DIAGNOSIS — G57.62 MORTON'S NEUROMA, LEFT: ICD-10-CM

## 2020-08-19 DIAGNOSIS — M76.71 PERONEAL TENDONITIS, RIGHT: ICD-10-CM

## 2020-08-19 PROCEDURE — 97110 THERAPEUTIC EXERCISES: CPT | Mod: GP | Performed by: PHYSICAL THERAPY ASSISTANT

## 2020-08-19 PROCEDURE — 97140 MANUAL THERAPY 1/> REGIONS: CPT | Mod: GP | Performed by: PHYSICAL THERAPY ASSISTANT

## 2020-08-19 PROCEDURE — 97033 APP MDLTY 1+IONTPHRSIS EA 15: CPT | Mod: GP | Performed by: PHYSICAL THERAPY ASSISTANT

## 2020-08-21 ENCOUNTER — THERAPY VISIT (OUTPATIENT)
Dept: PHYSICAL THERAPY | Facility: CLINIC | Age: 60
End: 2020-08-21
Payer: COMMERCIAL

## 2020-08-21 DIAGNOSIS — M79.671 FOOT PAIN, BILATERAL: ICD-10-CM

## 2020-08-21 DIAGNOSIS — M72.2 PLANTAR FASCIITIS, RIGHT: ICD-10-CM

## 2020-08-21 DIAGNOSIS — M79.672 FOOT PAIN, BILATERAL: ICD-10-CM

## 2020-08-21 DIAGNOSIS — M76.71 PERONEAL TENDONITIS, RIGHT: ICD-10-CM

## 2020-08-21 DIAGNOSIS — G57.62 MORTON'S NEUROMA, LEFT: ICD-10-CM

## 2020-08-21 PROCEDURE — 97140 MANUAL THERAPY 1/> REGIONS: CPT | Mod: GP | Performed by: PHYSICAL THERAPY ASSISTANT

## 2020-08-21 PROCEDURE — 97033 APP MDLTY 1+IONTPHRSIS EA 15: CPT | Mod: GP | Performed by: PHYSICAL THERAPY ASSISTANT

## 2020-08-21 PROCEDURE — 97110 THERAPEUTIC EXERCISES: CPT | Mod: GP | Performed by: PHYSICAL THERAPY ASSISTANT

## 2020-08-26 ENCOUNTER — THERAPY VISIT (OUTPATIENT)
Dept: PHYSICAL THERAPY | Facility: CLINIC | Age: 60
End: 2020-08-26
Payer: COMMERCIAL

## 2020-08-26 DIAGNOSIS — M79.672 FOOT PAIN, BILATERAL: ICD-10-CM

## 2020-08-26 DIAGNOSIS — M76.71 PERONEAL TENDONITIS, RIGHT: ICD-10-CM

## 2020-08-26 DIAGNOSIS — M79.671 FOOT PAIN, BILATERAL: ICD-10-CM

## 2020-08-26 DIAGNOSIS — G57.62 MORTON'S NEUROMA, LEFT: ICD-10-CM

## 2020-08-26 DIAGNOSIS — M72.2 PLANTAR FASCIITIS, RIGHT: ICD-10-CM

## 2020-08-26 PROCEDURE — 97033 APP MDLTY 1+IONTPHRSIS EA 15: CPT | Mod: GP | Performed by: PHYSICAL THERAPIST

## 2020-08-26 PROCEDURE — 97110 THERAPEUTIC EXERCISES: CPT | Mod: GP | Performed by: PHYSICAL THERAPIST

## 2020-08-26 PROCEDURE — 97140 MANUAL THERAPY 1/> REGIONS: CPT | Mod: GP | Performed by: PHYSICAL THERAPIST

## 2020-09-02 ENCOUNTER — THERAPY VISIT (OUTPATIENT)
Dept: PHYSICAL THERAPY | Facility: CLINIC | Age: 60
End: 2020-09-02
Payer: COMMERCIAL

## 2020-09-02 DIAGNOSIS — M79.671 FOOT PAIN, BILATERAL: ICD-10-CM

## 2020-09-02 DIAGNOSIS — M79.672 FOOT PAIN, BILATERAL: ICD-10-CM

## 2020-09-02 DIAGNOSIS — M72.2 PLANTAR FASCIITIS, RIGHT: ICD-10-CM

## 2020-09-02 DIAGNOSIS — M76.71 PERONEAL TENDONITIS, RIGHT: ICD-10-CM

## 2020-09-02 DIAGNOSIS — G57.62 MORTON'S NEUROMA, LEFT: ICD-10-CM

## 2020-09-02 PROCEDURE — 97140 MANUAL THERAPY 1/> REGIONS: CPT | Mod: GP | Performed by: PHYSICAL THERAPIST

## 2020-09-02 PROCEDURE — 97110 THERAPEUTIC EXERCISES: CPT | Mod: GP | Performed by: PHYSICAL THERAPIST

## 2020-09-02 PROCEDURE — 97112 NEUROMUSCULAR REEDUCATION: CPT | Mod: GP | Performed by: PHYSICAL THERAPIST

## 2020-09-09 ENCOUNTER — THERAPY VISIT (OUTPATIENT)
Dept: PHYSICAL THERAPY | Facility: CLINIC | Age: 60
End: 2020-09-09
Payer: COMMERCIAL

## 2020-09-09 DIAGNOSIS — M76.71 PERONEAL TENDONITIS, RIGHT: ICD-10-CM

## 2020-09-09 DIAGNOSIS — M79.671 FOOT PAIN, BILATERAL: ICD-10-CM

## 2020-09-09 DIAGNOSIS — G57.62 MORTON'S NEUROMA, LEFT: ICD-10-CM

## 2020-09-09 DIAGNOSIS — M79.672 FOOT PAIN, BILATERAL: ICD-10-CM

## 2020-09-09 DIAGNOSIS — M72.2 PLANTAR FASCIITIS, RIGHT: ICD-10-CM

## 2020-09-09 PROCEDURE — 97112 NEUROMUSCULAR REEDUCATION: CPT | Mod: GP | Performed by: PHYSICAL THERAPIST

## 2020-09-09 PROCEDURE — 97140 MANUAL THERAPY 1/> REGIONS: CPT | Mod: GP | Performed by: PHYSICAL THERAPIST

## 2020-09-09 PROCEDURE — 97033 APP MDLTY 1+IONTPHRSIS EA 15: CPT | Mod: GP | Performed by: PHYSICAL THERAPIST

## 2020-09-16 ENCOUNTER — THERAPY VISIT (OUTPATIENT)
Dept: PHYSICAL THERAPY | Facility: CLINIC | Age: 60
End: 2020-09-16
Payer: COMMERCIAL

## 2020-09-16 DIAGNOSIS — G57.62 MORTON'S NEUROMA, LEFT: ICD-10-CM

## 2020-09-16 DIAGNOSIS — M72.2 PLANTAR FASCIITIS, RIGHT: ICD-10-CM

## 2020-09-16 DIAGNOSIS — M79.671 FOOT PAIN, BILATERAL: ICD-10-CM

## 2020-09-16 DIAGNOSIS — M76.71 PERONEAL TENDONITIS, RIGHT: ICD-10-CM

## 2020-09-16 DIAGNOSIS — M79.672 FOOT PAIN, BILATERAL: ICD-10-CM

## 2020-09-16 PROCEDURE — 97112 NEUROMUSCULAR REEDUCATION: CPT | Mod: GP | Performed by: PHYSICAL THERAPY ASSISTANT

## 2020-09-16 PROCEDURE — 97140 MANUAL THERAPY 1/> REGIONS: CPT | Mod: GP | Performed by: PHYSICAL THERAPY ASSISTANT

## 2020-09-16 PROCEDURE — 97033 APP MDLTY 1+IONTPHRSIS EA 15: CPT | Mod: GP | Performed by: PHYSICAL THERAPY ASSISTANT

## 2020-09-30 ENCOUNTER — THERAPY VISIT (OUTPATIENT)
Dept: PHYSICAL THERAPY | Facility: CLINIC | Age: 60
End: 2020-09-30
Payer: COMMERCIAL

## 2020-09-30 DIAGNOSIS — M79.672 FOOT PAIN, BILATERAL: ICD-10-CM

## 2020-09-30 DIAGNOSIS — M76.71 PERONEAL TENDONITIS, RIGHT: ICD-10-CM

## 2020-09-30 DIAGNOSIS — M79.671 FOOT PAIN, BILATERAL: ICD-10-CM

## 2020-09-30 DIAGNOSIS — M72.2 PLANTAR FASCIITIS, RIGHT: ICD-10-CM

## 2020-09-30 DIAGNOSIS — G57.62 MORTON'S NEUROMA, LEFT: ICD-10-CM

## 2020-09-30 PROCEDURE — 97112 NEUROMUSCULAR REEDUCATION: CPT | Mod: GP | Performed by: PHYSICAL THERAPY ASSISTANT

## 2020-09-30 PROCEDURE — 97110 THERAPEUTIC EXERCISES: CPT | Mod: GP | Performed by: PHYSICAL THERAPY ASSISTANT

## 2020-10-05 ENCOUNTER — HOSPITAL ENCOUNTER (OUTPATIENT)
Dept: CT IMAGING | Facility: CLINIC | Age: 60
Discharge: HOME | End: 2020-10-05
Attending: INTERNAL MEDICINE | Admitting: INTERNAL MEDICINE
Payer: COMMERCIAL

## 2020-10-05 ENCOUNTER — HOSPITAL ENCOUNTER (OUTPATIENT)
Dept: NUCLEAR MEDICINE | Facility: CLINIC | Age: 60
Setting detail: NUCLEAR MEDICINE
End: 2020-10-05
Attending: INTERNAL MEDICINE
Payer: COMMERCIAL

## 2020-10-05 DIAGNOSIS — C61 MALIGNANT NEOPLASM OF PROSTATE METASTATIC TO BONE (H): ICD-10-CM

## 2020-10-05 DIAGNOSIS — C79.51 MALIGNANT NEOPLASM OF PROSTATE METASTATIC TO BONE (H): ICD-10-CM

## 2020-10-05 PROCEDURE — 78306 BONE IMAGING WHOLE BODY: CPT | Mod: 26 | Performed by: RADIOLOGY

## 2020-10-05 PROCEDURE — 78306 BONE IMAGING WHOLE BODY: CPT

## 2020-10-05 PROCEDURE — 74177 CT ABD & PELVIS W/CONTRAST: CPT

## 2020-10-05 PROCEDURE — 74177 CT ABD & PELVIS W/CONTRAST: CPT | Mod: 26 | Performed by: STUDENT IN AN ORGANIZED HEALTH CARE EDUCATION/TRAINING PROGRAM

## 2020-10-05 PROCEDURE — 343N000001 HC RX 343: Performed by: INTERNAL MEDICINE

## 2020-10-05 PROCEDURE — 250N000011 HC RX IP 250 OP 636: Performed by: STUDENT IN AN ORGANIZED HEALTH CARE EDUCATION/TRAINING PROGRAM

## 2020-10-05 PROCEDURE — 250N000011 HC RX IP 250 OP 636

## 2020-10-05 PROCEDURE — A9503 TC99M MEDRONATE: HCPCS | Performed by: INTERNAL MEDICINE

## 2020-10-05 PROCEDURE — 71260 CT THORAX DX C+: CPT | Mod: 26 | Performed by: STUDENT IN AN ORGANIZED HEALTH CARE EDUCATION/TRAINING PROGRAM

## 2020-10-05 RX ORDER — TC 99M MEDRONATE 20 MG/10ML
20-30 INJECTION, POWDER, LYOPHILIZED, FOR SOLUTION INTRAVENOUS ONCE
Status: COMPLETED | OUTPATIENT
Start: 2020-10-05 | End: 2020-10-05

## 2020-10-05 RX ORDER — HEPARIN SODIUM (PORCINE) LOCK FLUSH IV SOLN 100 UNIT/ML 100 UNIT/ML
5 SOLUTION INTRAVENOUS
Status: DISCONTINUED | OUTPATIENT
Start: 2020-10-05 | End: 2020-10-06 | Stop reason: HOSPADM

## 2020-10-05 RX ORDER — IOPAMIDOL 755 MG/ML
135 INJECTION, SOLUTION INTRAVASCULAR ONCE
Status: COMPLETED | OUTPATIENT
Start: 2020-10-05 | End: 2020-10-05

## 2020-10-05 RX ORDER — HEPARIN SODIUM (PORCINE) LOCK FLUSH IV SOLN 100 UNIT/ML 100 UNIT/ML
5 SOLUTION INTRAVENOUS ONCE
Status: COMPLETED | OUTPATIENT
Start: 2020-10-05 | End: 2020-10-05

## 2020-10-05 RX ADMIN — HEPARIN SODIUM (PORCINE) LOCK FLUSH IV SOLN 100 UNIT/ML 5 ML: 100 SOLUTION at 11:36

## 2020-10-05 RX ADMIN — TC 99M MEDRONATE 26.2 MCI.: 20 INJECTION, POWDER, LYOPHILIZED, FOR SOLUTION INTRAVENOUS at 11:15

## 2020-10-05 RX ADMIN — IOPAMIDOL 135 ML: 755 INJECTION, SOLUTION INTRAVENOUS at 11:30

## 2020-10-07 ENCOUNTER — ONCOLOGY VISIT (OUTPATIENT)
Dept: ONCOLOGY | Facility: CLINIC | Age: 60
End: 2020-10-07
Attending: PHYSICIAN ASSISTANT
Payer: COMMERCIAL

## 2020-10-07 ENCOUNTER — INFUSION THERAPY VISIT (OUTPATIENT)
Dept: ONCOLOGY | Facility: CLINIC | Age: 60
End: 2020-10-07
Attending: INTERNAL MEDICINE
Payer: COMMERCIAL

## 2020-10-07 VITALS
WEIGHT: 239 LBS | BODY MASS INDEX: 32.41 KG/M2 | OXYGEN SATURATION: 98 % | DIASTOLIC BLOOD PRESSURE: 75 MMHG | TEMPERATURE: 98.5 F | HEART RATE: 65 BPM | RESPIRATION RATE: 16 BRPM | SYSTOLIC BLOOD PRESSURE: 119 MMHG

## 2020-10-07 DIAGNOSIS — C64.1 RENAL CELL CARCINOMA, RIGHT (H): ICD-10-CM

## 2020-10-07 DIAGNOSIS — C61 MALIGNANT NEOPLASM OF PROSTATE METASTATIC TO BONE (H): Primary | ICD-10-CM

## 2020-10-07 DIAGNOSIS — C79.51 MALIGNANT NEOPLASM OF PROSTATE METASTATIC TO BONE (H): Primary | ICD-10-CM

## 2020-10-07 LAB
ALBUMIN SERPL-MCNC: 4 G/DL (ref 3.4–5)
ALP SERPL-CCNC: 35 U/L (ref 40–150)
ALT SERPL W P-5'-P-CCNC: 24 U/L (ref 0–70)
ANION GAP SERPL CALCULATED.3IONS-SCNC: 4 MMOL/L (ref 3–14)
AST SERPL W P-5'-P-CCNC: 15 U/L (ref 0–45)
BASOPHILS # BLD AUTO: 0 10E9/L (ref 0–0.2)
BASOPHILS NFR BLD AUTO: 0.5 %
BILIRUB SERPL-MCNC: 0.4 MG/DL (ref 0.2–1.3)
BUN SERPL-MCNC: 21 MG/DL (ref 7–30)
CALCIUM SERPL-MCNC: 9.3 MG/DL (ref 8.5–10.1)
CHLORIDE SERPL-SCNC: 110 MMOL/L (ref 94–109)
CO2 SERPL-SCNC: 25 MMOL/L (ref 20–32)
CREAT SERPL-MCNC: 1.04 MG/DL (ref 0.66–1.25)
DIFFERENTIAL METHOD BLD: NORMAL
EOSINOPHIL # BLD AUTO: 0.1 10E9/L (ref 0–0.7)
EOSINOPHIL NFR BLD AUTO: 1.9 %
ERYTHROCYTE [DISTWIDTH] IN BLOOD BY AUTOMATED COUNT: 13.8 % (ref 10–15)
GFR SERPL CREATININE-BSD FRML MDRD: 78 ML/MIN/{1.73_M2}
GLUCOSE SERPL-MCNC: 91 MG/DL (ref 70–99)
HCT VFR BLD AUTO: 43.5 % (ref 40–53)
HGB BLD-MCNC: 14.4 G/DL (ref 13.3–17.7)
IMM GRANULOCYTES # BLD: 0 10E9/L (ref 0–0.4)
IMM GRANULOCYTES NFR BLD: 0.3 %
LDH SERPL L TO P-CCNC: 170 U/L (ref 85–227)
LYMPHOCYTES # BLD AUTO: 2 10E9/L (ref 0.8–5.3)
LYMPHOCYTES NFR BLD AUTO: 33.3 %
MCH RBC QN AUTO: 29.4 PG (ref 26.5–33)
MCHC RBC AUTO-ENTMCNC: 33.1 G/DL (ref 31.5–36.5)
MCV RBC AUTO: 89 FL (ref 78–100)
MONOCYTES # BLD AUTO: 0.5 10E9/L (ref 0–1.3)
MONOCYTES NFR BLD AUTO: 8.5 %
NEUTROPHILS # BLD AUTO: 3.3 10E9/L (ref 1.6–8.3)
NEUTROPHILS NFR BLD AUTO: 55.5 %
NRBC # BLD AUTO: 0 10*3/UL
NRBC BLD AUTO-RTO: 0 /100
PLATELET # BLD AUTO: 215 10E9/L (ref 150–450)
POTASSIUM SERPL-SCNC: 4.2 MMOL/L (ref 3.4–5.3)
PROT SERPL-MCNC: 7.6 G/DL (ref 6.8–8.8)
PSA SERPL-MCNC: 0.29 UG/L (ref 0–4)
RBC # BLD AUTO: 4.89 10E12/L (ref 4.4–5.9)
SODIUM SERPL-SCNC: 139 MMOL/L (ref 133–144)
WBC # BLD AUTO: 5.9 10E9/L (ref 4–11)

## 2020-10-07 PROCEDURE — 250N000011 HC RX IP 250 OP 636: Performed by: INTERNAL MEDICINE

## 2020-10-07 PROCEDURE — 84403 ASSAY OF TOTAL TESTOSTERONE: CPT | Performed by: INTERNAL MEDICINE

## 2020-10-07 PROCEDURE — 86316 IMMUNOASSAY TUMOR OTHER: CPT

## 2020-10-07 PROCEDURE — 250N000011 HC RX IP 250 OP 636: Performed by: PHYSICIAN ASSISTANT

## 2020-10-07 PROCEDURE — 96365 THER/PROPH/DIAG IV INF INIT: CPT

## 2020-10-07 PROCEDURE — 84153 ASSAY OF PSA TOTAL: CPT | Performed by: PATHOLOGY

## 2020-10-07 PROCEDURE — 85025 COMPLETE CBC W/AUTO DIFF WBC: CPT | Performed by: PATHOLOGY

## 2020-10-07 PROCEDURE — 84999 UNLISTED CHEMISTRY PROCEDURE: CPT

## 2020-10-07 PROCEDURE — 99214 OFFICE O/P EST MOD 30 MIN: CPT | Performed by: INTERNAL MEDICINE

## 2020-10-07 PROCEDURE — 80053 COMPREHEN METABOLIC PANEL: CPT | Performed by: PATHOLOGY

## 2020-10-07 PROCEDURE — G0463 HOSPITAL OUTPT CLINIC VISIT: HCPCS

## 2020-10-07 PROCEDURE — 258N000003 HC RX IP 258 OP 636: Performed by: INTERNAL MEDICINE

## 2020-10-07 PROCEDURE — 96402 CHEMO HORMON ANTINEOPL SQ/IM: CPT

## 2020-10-07 PROCEDURE — 96372 THER/PROPH/DIAG INJ SC/IM: CPT | Performed by: INTERNAL MEDICINE

## 2020-10-07 PROCEDURE — 83615 LACTATE (LD) (LDH) ENZYME: CPT | Performed by: PATHOLOGY

## 2020-10-07 PROCEDURE — 86316 IMMUNOASSAY TUMOR OTHER: CPT | Performed by: INTERNAL MEDICINE

## 2020-10-07 RX ORDER — ZOLEDRONIC ACID 0.04 MG/ML
4 INJECTION, SOLUTION INTRAVENOUS ONCE
Status: CANCELLED | OUTPATIENT
Start: 2020-10-15 | End: 2020-10-15

## 2020-10-07 RX ORDER — HEPARIN SODIUM (PORCINE) LOCK FLUSH IV SOLN 100 UNIT/ML 100 UNIT/ML
5 SOLUTION INTRAVENOUS
Status: CANCELLED | OUTPATIENT
Start: 2020-10-15

## 2020-10-07 RX ORDER — HEPARIN SODIUM,PORCINE 10 UNIT/ML
5 VIAL (ML) INTRAVENOUS
Status: DISCONTINUED | OUTPATIENT
Start: 2020-10-07 | End: 2020-10-07 | Stop reason: HOSPADM

## 2020-10-07 RX ORDER — ZOLEDRONIC ACID 0.04 MG/ML
4 INJECTION, SOLUTION INTRAVENOUS ONCE
Status: COMPLETED | OUTPATIENT
Start: 2020-10-07 | End: 2020-10-07

## 2020-10-07 RX ORDER — HEPARIN SODIUM (PORCINE) LOCK FLUSH IV SOLN 100 UNIT/ML 100 UNIT/ML
5 SOLUTION INTRAVENOUS EVERY 8 HOURS PRN
Status: DISCONTINUED | OUTPATIENT
Start: 2020-10-07 | End: 2020-10-07 | Stop reason: HOSPADM

## 2020-10-07 RX ORDER — HEPARIN SODIUM,PORCINE 10 UNIT/ML
5 VIAL (ML) INTRAVENOUS
Status: CANCELLED | OUTPATIENT
Start: 2020-10-15

## 2020-10-07 RX ORDER — HEPARIN SODIUM (PORCINE) LOCK FLUSH IV SOLN 100 UNIT/ML 100 UNIT/ML
5 SOLUTION INTRAVENOUS
Status: DISCONTINUED | OUTPATIENT
Start: 2020-10-07 | End: 2020-10-07 | Stop reason: HOSPADM

## 2020-10-07 RX ADMIN — SODIUM CHLORIDE 250 ML: 9 INJECTION, SOLUTION INTRAVENOUS at 13:54

## 2020-10-07 RX ADMIN — Medication 5 ML: at 14:18

## 2020-10-07 RX ADMIN — Medication 5 ML: at 12:04

## 2020-10-07 RX ADMIN — LEUPROLIDE ACETATE 22.5 MG: KIT SUBCUTANEOUS at 14:03

## 2020-10-07 RX ADMIN — ZOLEDRONIC ACID 4 MG: 0.04 INJECTION, SOLUTION INTRAVENOUS at 13:54

## 2020-10-07 ASSESSMENT — PAIN SCALES - GENERAL: PAINLEVEL: NO PAIN (0)

## 2020-10-07 NOTE — PROGRESS NOTES
Infusion Nursing Note:  Walter DELORIS Reyes presents for Lupron injection, bert  Met with Dr. Smalls before infusion.    Note: N/A.    Pain: denies    Treatment Conditions:  Lab Results   Component Value Date    HGB 14.4 10/07/2020     Lab Results   Component Value Date    WBC 5.9 10/07/2020      Lab Results   Component Value Date    ANEU 3.3 10/07/2020     Lab Results   Component Value Date     10/07/2020      Lab Results   Component Value Date     10/07/2020                   Lab Results   Component Value Date    POTASSIUM 4.2 10/07/2020           No results found for: MAG         Lab Results   Component Value Date    CR 1.04 10/07/2020                   Lab Results   Component Value Date    BETHANY 9.3 10/07/2020                Lab Results   Component Value Date    BILITOTAL 0.4 10/07/2020           Lab Results   Component Value Date    ALBUMIN 4.0 10/07/2020                    Lab Results   Component Value Date    ALT 24 10/07/2020           Lab Results   Component Value Date    AST 15 10/07/2020       Results reviewed, labs MET treatment parameters, ok to proceed with treatment.    Intravenous Access:  Implanted Port.    Post Infusion Assessment:  Patient tolerated infusion without incident.  Patient tolerated injection without incident.  Blood return noted pre and post infusion.  No evidence of extravasations.  Access discontinued per protocol.    Discharge Plan:   Patient declined prescription refills.  Discharge instructions reviewed with: Patient.  Patient and/or family verbalized understanding of discharge instructions and all questions answered.  AVS to patient via [employer].  Patient will return 1/6 for next appointment.   Patient discharged in stable condition accompanied by: self.    Jessica Nuñez, RN, RN

## 2020-10-07 NOTE — LETTER
"    10/7/2020         RE: Walter Reyes  [address] [facility] [id number]        Dear Colleague,    Thank you for referring your patient, Walter Reyes, to the [facility]. Please see a copy of my visit note below.    MEDICAL ONCOLOGY FOLLOW-UP NOTE    REFERRING PROVIDER: [facility].    REASON FOR VISIT: Follow-up for metastatic hormone-sensitive prostate cancer, currently on ADT alone.    HISTORY OF PRESENT ILLNESS: Mr. 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 His oncologic history is detailed below.     Walter reports feeling well overall except for left lower rib cage pain x 2 months. Energy levels and hot flashes are stable. Libido is non-existent. Gained some weight over the holidays but now stable. No recurrence of grade 1-2 PSN in hands/feet since after a 21-mile hike down the [place] in Dec 2019. No signs/symptoms of disease progression including hematuria etc.     ONCOLOGIC HISTORY:  1. 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. Prior PSA were 1.4 on 8/16/17, 2.4 on 6/28/16, 2.9 on 6/28/16, and as low as 0.4 on 3/26/2003.   - 1/08/2019: Consultation with Sarah Wilson CNP in Urology clinic. Repeat PSA 9.6.  - 1/16/2019: MRI prostate with contrast - \"This examination is characterized as PIRADS 5- very high probability. Clinically significant cancer is highly likely to be present. There is a large, invasive mass arising from the right peripheral zone and extending into the neurovascular bundle, seminal vesicle, and along the anterolateral right " "mesorectal fascia. Metastatic right external iliac lymph node. Metastatic lesion in the posterior/superior right acetabulum.\"  - 1/25/2019: CT abdomen and pelvis with IV contrast - \"1. Heterogeneous enhancing mass posteriorly in the upper pole of the right kidney measures 4.5 x 5.8 x 5.7 cm (AP by transverse by craniocaudal). It has a small nodular component extending posterior medially which abuts the right psoas muscle. This nodular extension measures 2.1 x 2.1 cm. Minimal stranding about the mass. No definite thrombus within the right renal vein. This renal mass is compatible with a renal cell carcinoma. A paraaortic lymph node situated immediately posterior to the left renal vein measures 1.1 cm in short axis, suspicious for metastasis. 2. A 2 cm right external iliac lymph node is also suspicious for metastases. 3. Multiple sclerotic osseous lesions suspicious for metastases. These include 0.8 and 0.6 cm sclerotic lesions laterally in the right iliac wing (images 53 and 62 respectively). Ill-defined groundglass density laterally in the right acetabulum measuring 1.7 x 1.2 cm corresponds with the lesion identified on MRI. A 0.4 cm groundglass density in the left acetabulum. Sclerotic lesion in the left femoral neck measures 0.9 x 1.6 cm (image 81). Sclerotic metastases would be more compatible with prostate metastases. 4. A 3 subcentimeter hepatic lesions are indeterminate. Metastases would be a consideration. These can be further characterized with liver MRI.\"  - 1/25/2019: NM bone scan - \"There is focal bony uptake in the left femoral neck, right acetabulum, right of midline at the S1 or L5 level of the spine, within multiple bilateral ribs, in the C7 or T1 level of the spine, and anteriorly within the skull. Findings are suspicious for metastases.\"  - 1/31/19: CT chest with contrast - \" No suspicious nodules in the chest.  Stable appearance of a 5.8 cm right renal mass concerning for renal carcinoma until " "proven otherwise.  Stable indeterminant subcentimeter hypodensities in the liver.  Multifocal osteoblastic metastasis including 2.5 cm lesion in the right fifth rib posteriorly and a 1.6 cm lesion in the right third rib posteriorly. No lytic lesions identified.\"  - 2/6/19: CT-guided right sclerotic fifth rib lesion biopsy - \"Metastatic carcinoma, consistent with prostate primary.  Immunohistochemical stains performed show the metastatic carcinoma stains positive for NKX3.1 (prostate marker), negative for STACIE 3 and PAX8, which supports the above diagnosis.\"  - 2/15/19: Started Casodex 50mg every day and consented for the biobanking protocol. 3/18/19 - stopped Casodex.  - 2/19/19: Case discussed in tumor board - recommendation for nephectomy for suspected malignant right renal mass.   - 2/26/19: Started Lupron 22.5mg every 3 months.   - 5/8/19: Started Docetaxel 75mg/m2 IV every 3 weeks. 06/18/19 - cycle 3, 7/10/19 - cycle 4, 07/31/19 - cycle 5, 08/21/19 - cycle 6.   - 10/2/19: Restaging CT CAP and NM Bone Scan showed improved osseous disease, no evidence of recurrent or new metastatic disease.  - 1/5/20: Restaging CT CAP and NM Bone Scan showed stable osseous disease, no evidence of recurrent or new metastatic disease.  - 4/6/20: NM bone scan with slightly improved uptake in known skeletal mets. CT C/A/P with contrast with stable osseous mets, no yusuf enlargement, no new visceral mets etc.  - 04/08/20: Zometa every 3 months.  - 10/5/20: CT C/A/P with contrast and NM bone scan - SD.      2. Stage III ([id number]), grade 3 of 4, clear-cell RCC of right kidney:  - Incidentally diagnosed as above.   - Underwent curative-intent robotic right radical nephrectomy with Dr. Wesley Cleveland on 3/19/19. No tumor spillage per op report.   - Path showed: \"Histologic Type: Clear cell renal cell carcinoma; Sarcomatoid Features: Not identified; Histologic Grade: Nucleolar grade 3 (WHO/ISUP). Extent Tumor Size: 4.3 cm. Microscopic " "Tumor Extension: Tumor extension into renal sinus (in vascular structures). Margins: Negative. Tumor Necrosis: Present; focal. Lymph-Vascular Invasion: Not identified.  Pathologic Staging (pTNM) Primary Tumor (pT): pT3a: Tumor extends into renal vein branches/renal sinus. Regional Lymph Nodes (pN): pNX. Number of Lymph Nodes Examined: 0 Distant Metastasis (pM): pM N/A.\"  - Restaging scans as above.    PAST MEDICAL HISTORY:  Past Medical History:   Diagnosis Date     Cancer of kidney, right (H)      Complication of anesthesia     slow wakeup      Malignant neoplasm of prostate metastatic to bone (H) 2/14/2019     Thyroid disease      PAST SURGICAL HISTORY:   Past Surgical History:   Procedure Laterality Date     CYSTOSCOPY      about 10 years ago     INSERT PORT VASCULAR ACCESS Right 5/2/2019    Procedure: Chest Port Placement;  Surgeon: Tate Burciaga PA-C;  Location: [facility] OR     IR CHEST PORT PLACEMENT > 5 YRS OF AGE  5/2/2019     LAPAROSCOPIC NEPHRECTOMY Right 3/19/2019    Procedure: Right laparoscopic radical nephrectomy;  Surgeon: Wesley Cleveland MD;  Location: [facility] OR      SOCIAL HISTORY:   Social History     Tobacco Use     Smoking status: Never Smoker     Smokeless tobacco: Never Used   Substance Use Topics     Alcohol use: Not on file     Comment: wine - very rare     Drug use: No     FAMILY HISTORY:   Family History   Problem Relation Age of Onset     Diabetes Father      ALLERGIES:   Allergies   Allergen Reactions     Doxycycline GI Disturbance     CURRENT MEDICATIONS:   Current Outpatient Medications:      amLODIPine (NORVASC) 10 MG tablet, Take 1 tablet (10 mg) by mouth daily, Disp: 30 tablet, Rfl: 0     atorvastatin (LIPITOR) 20 MG tablet, Take 20 mg by mouth daily, Disp: , Rfl:      calcium citrate-vitamin D (CITRACAL) 315-250 MG-UNIT TABS per tablet, Take 650 mg by mouth 2 times daily , Disp: , Rfl:      cetirizine (ZYRTEC) 10 MG tablet, Take 10 mg by mouth as needed for allergies , " Disp: , Rfl:      co-enzyme Q-10 100 MG CAPS capsule, Take 100 mg by mouth daily , Disp: , Rfl:      cycloSPORINE (RESTASIS) 0.05 % ophthalmic emulsion, Place 1 drop into both eyes 2 times daily , Disp: , Rfl:      diclofenac (VOLTAREN) 1 % topical gel, Place 2 g onto the skin 4 times daily, Disp: 100 g, Rfl: 1     fluticasone (FLONASE) 50 MCG/ACT nasal spray, Spray 2 sprays in nostril daily as needed , Disp: , Rfl:      Glucosamine-Chondroit-Vit C-Mn (GLUCOSAMINE 1500 COMPLEX) CAPS, Take 1 capsule by mouth daily, Disp: , Rfl:      levothyroxine (SYNTHROID/LEVOTHROID) 100 MCG tablet, Take 100 mcg by mouth daily, Disp: , Rfl:      Multiple Vitamins-Minerals (MULTIVITAMIN ADULT EXTRA C PO), Take 1 tablet by mouth every 24 hours, Disp: , Rfl:      Nutritional Supplements (SALMON OIL) CAPS, Take 2 capsules by mouth daily , Disp: , Rfl:      omeprazole (PRILOSEC) 20 MG DR capsule, Take 20 mg by mouth daily, Disp: , Rfl:      tamsulosin (FLOMAX) 0.4 MG capsule, Take 1 capsule (0.4 mg) by mouth daily, Disp: 30 capsule, Rfl: 3     dexamethasone (DECADRON) 4 MG/ML injection, Apply 1 mL (4 mg) topically once for 1 dose For physical therapy, iontophoresis, Disp: 30 mL, Rfl: 0    Current Facility-Administered Medications:      heparin 100 UNIT/ML injection 5 mL, 5 mL, Intracatheter, Q8H PRN, Uday Smalls MD, 5 mL at 10/07/20 1204     sodium chloride (PF) 0.9% PF flush 20 mL, 20 mL, Intracatheter, Q8H, Uday Smalls MD, 20 mL at 10/07/20 1204    PHYSICAL EXAMINATION:  VITALS: /75 (BP Location: Right arm, Patient Position: Sitting, Cuff Size: Adult Regular)   Pulse 65   Temp 98.5  F (36.9  C) (Oral)   Resp 16   Wt 108.4 kg (239 lb)   SpO2 98%   BMI 32.41 kg/m     ECOG PS 0.  GENERAL: The patient is a pleasant male in no acute distress.  HEENT: EOMI. Sclerae are anicteric. Oral mucosa is pink and moist.   Lymph: Neck is supple with no lymphadenopathy in the cervical or supraclavicular areas.   Heart: Regular rate and  rhythm.   Lungs: Clear to auscultation bilaterally. Normal respiratory effort.  Abdomen: Bowel sounds present, soft, nontender with no palpable hepatosplenomegaly or masses.   Extremities: No lower extremity edema noted bilaterally.  Neuro: Alert and fully oriented, no focal deficits.  Psych: Mood and affect normal.  Skin: No rashes, petechiae, or bruising noted on exposed skin.    LABORATORY DATA:   Lab Test 10/07/20  1205 07/13/20  1500 07/11/20  0511 07/10/20  1442 04/08/20  0930   WBC 5.9 5.5 5.2 8.1 10.4   RBC 4.89 4.88 4.45 4.70 4.41   HGB 14.4 14.2 13.0* 13.7 13.3   HCT 43.5 44.2 40.7 43.2 39.5*   MCV 89 91 92 92 90   MCH 29.4 29.1 29.2 29.1 30.2   MCHC 33.1 32.1 31.9 31.7 33.7   RDW 13.8 13.1 13.3 13.3 13.0    213 157 177 226   NEUTROPHIL 55.5 53.6  --   --  73.9    137 141 138 140   POTASSIUM 4.2 3.9 3.8 4.0 4.0   CHLORIDE 110* 105 110* 106 107   CO2 25 24 26 26 28   ANIONGAP 4 8 5 6 5   GLC 91 92 102* 94 102*   BUN 21 16 11 16 20   CR 1.04 1.22 1.08 1.12 1.08   BETHANY 9.3 9.3 8.4* 8.9 9.4   PROTTOTAL 7.6 7.7  --  7.2 7.6   ALBUMIN 4.0 4.0  --  3.8 4.0   BILITOTAL 0.4 0.4  --  0.6 0.5   ALKPHOS 35* 52  --  55 75   AST 15 18  --  16 16   ALT 24 25  --  21 25     Lab Test 10/07/20  1205 07/13/20  1500 08/21/19  1436 07/31/19  1225 07/10/19  1332    165 213 219 218     Lab Test 10/07/20  1205 07/13/20  1500 04/08/20  0930 01/06/20  0858 10/02/19  0955 08/21/19  1436   PSA 0.29 0.42 0.41 0.44 0.58 0.71   CGAB  --  174* 852* 470* 736* 597*   TESTOSTTOTAL  --  13* 8* 16* 16* <2*   CGAB - Chromogranin A; TESTOSTTOTAL: Total testosterone.    IMAGING:  As above.    ASSESSMENT & PLAN: Mr. Reyes is a delightful 60 year old gentleman with recently diagnosed metastatic castration sensitive prostate adenocarcinoma as well as an incidentally diagnosed stage III clear-cell renal cell carcinoma of the right kidney (s/p radical R nephrectomy 3/19/19), who is here for a follow-up visit after  "completion of docetaxel for prostate cancer.     1. Metastatic prostate cancer, with involvement of the bones and RPLN:   - Patient met the criteria for CHAARTED \"high-volume\" metastatic hormone-sensitive prostate cancer.   - After a review of systemic therapy options, patient opted to start docetaxel in combination with ADT (per CHAARTED, [id number], STAMPEDE). He tolerated 6 cycles of docetaxel fairly well (5/8/19 through 8/21/19), with the exception of mild fatigue and mild neuropathy.  - Restaging CT CAP and NM Bone Scan from 10/5/20 shows continued partial response compared to pre-chemo baseline. The L5 sclerosis could be due to bone healing especially since pt is asymptomatic and PSA is decreasing.  - PSA continues a gradual down trend with some variability. No clinical evidence of progression. While his PSA is not <0.2 at 7 months, which was shown to predict a better OS in a post-hoc analysis of CHAARTED data (Bri et al, JCO 2018) he does have an intact prostate gland and hence PSA is exceedingly unlikely to become undetectable even if his cancer is very well controlled.  - Continue leuprolide 22.5mg every 3 months - next dose today. Because of Lupron's national shortage, will switch to Eligard 22.5mg today.   - He understands the risks and benefits of ADT including injection site reactions, hot flashes, mood changes and long-term cardiovascular, bone health, etc.    2. Bone metastases:   - Noted to have osseous metastatic disease at the time of diagnosis, which has improved now following 6 cycles of docetaxel and continued ADT.  - Encouraged to continue calcium and vitamin D supplementation.  - Will continue Zometa 4mg IV every 3 months today. No contraindications (last dental procedure in early 2020) and pt advised on side effects including fever, bone pain, hypocalcemia, hypomagnesemia, ONJ, JESSICA, atypical fractures etc. Plan for 2 years total.      3. Stage 3, UISS-high risk ccRCC:  - Patient " "understands that he has a stage III, UISS high risk clear-cell renal cell carcinoma with approximately 40-50% risk of recurrence after definitive surgery.   - At this time, he has no clear evidence of residual disease. The retroperitoneal lymphadenopathy is more consistent with metastatic prostate cancer and is improving/resolved.   - Per the inbasket response from Dr. Cleveland on the bladder dome CT finding - \"Not too worried about this, but probably reasonable to have an office cystoscopy to take a peek at it.\" His team will schedule this.  - Continue active surveillance with self-reporting for signs/symptoms of recurrence (this was previously explained in detail) and periodic H&P and scans.      4. Genetics referral:  - Was referred and seen by Genetics on 3/7/19. No evidence of inherited cancer syndromes found on work-up.      5. Research participation:  - There were no available clinical trials for this gentleman at the time of diagnosis, especially given concurrent malignancy.   - Enrolled in the institutional biobanking study on 2/15/19.     Return to clinic in ~3 months with labs, scans and next doses of Lupron plus Zometa.    BILLIN.     Uday Smalls M.D.  [employer]. Professor of Medicine  Division of Hematology, Oncology & Transplantation  [facility]       "

## 2020-10-07 NOTE — NURSING NOTE
Chief Complaint   Patient presents with     Port Draw     Labs drawn via PORT by RN in lab. VS taken.      Catherine Rucker RN

## 2020-10-07 NOTE — PROGRESS NOTES
"MEDICAL ONCOLOGY FOLLOW-UP NOTE    REFERRING PROVIDER: Jorge Alberto Yepez MD at [facility].    REASON FOR VISIT: Follow-up for metastatic hormone-sensitive prostate cancer, currently on ADT alone.    HISTORY OF PRESENT ILLNESS: Mr. 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 His oncologic history is detailed below.     Walter reports feeling well overall except for left lower rib cage pain x 2 months. Energy levels and hot flashes are stable. Libido is non-existent. Gained some weight over the holidays but now stable. No recurrence of grade 1-2 PSN in hands/feet since after a 21-mile hike down the [facility] [place] in Dec 2019. No signs/symptoms of disease progression including hematuria etc.     ONCOLOGIC HISTORY:  1. 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 Naylor at [facility]. Prior PSA were 1.4 on 8/16/17, 2.4 on 6/28/16, 2.9 on 6/28/16, and as low as 0.4 on 3/26/2003.   - 1/08/2019: Consultation with Sarah Wilson CNP in Urology clinic. Repeat PSA 9.6.  - 1/16/2019: MRI prostate with contrast - \"This examination is characterized as PIRADS 5- very high probability. Clinically significant cancer is highly likely to be present. There is a large, invasive mass arising from the right peripheral zone and extending into the neurovascular bundle, seminal vesicle, and along the anterolateral right mesorectal fascia. Metastatic right external iliac lymph node. Metastatic lesion in the posterior/superior right acetabulum.\"  - 1/25/2019: CT abdomen and pelvis with IV contrast - \"1. Heterogeneous enhancing mass posteriorly in the upper pole of the right kidney measures 4.5 x " "5.8 x 5.7 cm (AP by transverse by craniocaudal). It has a small nodular component extending posterior medially which abuts the right psoas muscle. This nodular extension measures 2.1 x 2.1 cm. Minimal stranding about the mass. No definite thrombus within the right renal vein. This renal mass is compatible with a renal cell carcinoma. A paraaortic lymph node situated immediately posterior to the left renal vein measures 1.1 cm in short axis, suspicious for metastasis. 2. A 2 cm right external iliac lymph node is also suspicious for metastases. 3. Multiple sclerotic osseous lesions suspicious for metastases. These include 0.8 and 0.6 cm sclerotic lesions laterally in the right iliac wing (images 53 and 62 respectively). Ill-defined groundglass density laterally in the right acetabulum measuring 1.7 x 1.2 cm corresponds with the lesion identified on MRI. A 0.4 cm groundglass density in the left acetabulum. Sclerotic lesion in the left femoral neck measures 0.9 x 1.6 cm (image 81). Sclerotic metastases would be more compatible with prostate metastases. 4. A 3 subcentimeter hepatic lesions are indeterminate. Metastases would be a consideration. These can be further characterized with liver MRI.\"  - 1/25/2019: NM bone scan - \"There is focal bony uptake in the left femoral neck, right acetabulum, right of midline at the S1 or L5 level of the spine, within multiple bilateral ribs, in the C7 or T1 level of the spine, and anteriorly within the skull. Findings are suspicious for metastases.\"  - 1/31/19: CT chest with contrast - \" No suspicious nodules in the chest.  Stable appearance of a 5.8 cm right renal mass concerning for renal carcinoma until proven otherwise.  Stable indeterminant subcentimeter hypodensities in the liver.  Multifocal osteoblastic metastasis including 2.5 cm lesion in the right fifth rib posteriorly and a 1.6 cm lesion in the right third rib posteriorly. No lytic lesions identified.\"  - 2/6/19: CT-guided " "right sclerotic fifth rib lesion biopsy - \"Metastatic carcinoma, consistent with prostate primary.  Immunohistochemical stains performed show the metastatic carcinoma stains positive for NKX3.1 (prostate marker), negative for STACIE 3 and PAX8, which supports the above diagnosis.\"  - 2/15/19: Started Casodex 50mg every day and consented for the biobanking protocol. 3/18/19 - stopped Casodex.  - 2/19/19: Case discussed in tumor board - recommendation for nephectomy for suspected malignant right renal mass.   - 2/26/19: Started Lupron 22.5mg every 3 months.   - 5/8/19: Started Docetaxel 75mg/m2 IV every 3 weeks. 06/18/19 - cycle 3, 7/10/19 - cycle 4, 07/31/19 - cycle 5, 08/21/19 - cycle 6.   - 10/2/19: Restaging CT CAP and NM Bone Scan showed improved osseous disease, no evidence of recurrent or new metastatic disease.  - 1/5/20: Restaging CT CAP and NM Bone Scan showed stable osseous disease, no evidence of recurrent or new metastatic disease.  - 4/6/20: NM bone scan with slightly improved uptake in known skeletal mets. CT C/A/P with contrast with stable osseous mets, no yusuf enlargement, no new visceral mets etc.  - 04/08/20: Zometa every 3 months.  - 10/5/20: CT C/A/P with contrast and NM bone scan - SD.      2. Stage III ([id number]), grade 3 of 4, clear-cell RCC of right kidney:  - Incidentally diagnosed as above.   - Underwent curative-intent robotic right radical nephrectomy with Dr. Wesley Cleveland on 3/19/19. No tumor spillage per op report.   - Path showed: \"Histologic Type: Clear cell renal cell carcinoma; Sarcomatoid Features: Not identified; Histologic Grade: Nucleolar grade 3 (WHO/ISUP). Extent Tumor Size: 4.3 cm. Microscopic Tumor Extension: Tumor extension into renal sinus (in vascular structures). Margins: Negative. Tumor Necrosis: Present; focal. Lymph-Vascular Invasion: Not identified.  Pathologic Staging (pTNM) Primary Tumor (pT): pT3a: Tumor extends into renal vein branches/renal sinus. Regional Lymph " "Nodes (pN): pNX. Number of Lymph Nodes Examined: 0 Distant Metastasis (pM): pM N/A.\"  - Restaging scans as above.    PAST MEDICAL HISTORY:  Past Medical History:   Diagnosis Date     Cancer of kidney, right (H)      Complication of anesthesia     slow wakeup      Malignant neoplasm of prostate metastatic to bone (H) 2/14/2019     Thyroid disease      PAST SURGICAL HISTORY:   Past Surgical History:   Procedure Laterality Date     CYSTOSCOPY      about 10 years ago     INSERT PORT VASCULAR ACCESS Right 5/2/2019    Procedure: Chest Port Placement;  Surgeon: Tate Burciaga PA-C;  Location: [facility] OR     IR CHEST PORT PLACEMENT > 5 YRS OF AGE  5/2/2019     LAPAROSCOPIC NEPHRECTOMY Right 3/19/2019    Procedure: Right laparoscopic radical nephrectomy;  Surgeon: Wesley Cleveland MD;  Location: [facility] OR      SOCIAL HISTORY:   Social History     Tobacco Use     Smoking status: Never Smoker     Smokeless tobacco: Never Used   Substance Use Topics     Alcohol use: Not on file     Comment: wine - very rare     Drug use: No     FAMILY HISTORY:   Family History   Problem Relation Age of Onset     Diabetes Father      ALLERGIES:   Allergies   Allergen Reactions     Doxycycline GI Disturbance     CURRENT MEDICATIONS:   Current Outpatient Medications:      amLODIPine (NORVASC) 10 MG tablet, Take 1 tablet (10 mg) by mouth daily, Disp: 30 tablet, Rfl: 0     atorvastatin (LIPITOR) 20 MG tablet, Take 20 mg by mouth daily, Disp: , Rfl:      calcium citrate-vitamin D (CITRACAL) 315-250 MG-UNIT TABS per tablet, Take 650 mg by mouth 2 times daily , Disp: , Rfl:      cetirizine (ZYRTEC) 10 MG tablet, Take 10 mg by mouth as needed for allergies , Disp: , Rfl:      co-enzyme Q-10 100 MG CAPS capsule, Take 100 mg by mouth daily , Disp: , Rfl:      cycloSPORINE (RESTASIS) 0.05 % ophthalmic emulsion, Place 1 drop into both eyes 2 times daily , Disp: , Rfl:      diclofenac (VOLTAREN) 1 % topical gel, Place 2 g onto the skin 4 times " daily, Disp: 100 g, Rfl: 1     fluticasone (FLONASE) 50 MCG/ACT nasal spray, Spray 2 sprays in nostril daily as needed , Disp: , Rfl:      Glucosamine-Chondroit-Vit C-Mn (GLUCOSAMINE 1500 COMPLEX) CAPS, Take 1 capsule by mouth daily, Disp: , Rfl:      levothyroxine (SYNTHROID/LEVOTHROID) 100 MCG tablet, Take 100 mcg by mouth daily, Disp: , Rfl:      Multiple Vitamins-Minerals (MULTIVITAMIN ADULT EXTRA C PO), Take 1 tablet by mouth every 24 hours, Disp: , Rfl:      Nutritional Supplements (SALMON OIL) CAPS, Take 2 capsules by mouth daily , Disp: , Rfl:      omeprazole (PRILOSEC) 20 MG DR capsule, Take 20 mg by mouth daily, Disp: , Rfl:      tamsulosin (FLOMAX) 0.4 MG capsule, Take 1 capsule (0.4 mg) by mouth daily, Disp: 30 capsule, Rfl: 3     dexamethasone (DECADRON) 4 MG/ML injection, Apply 1 mL (4 mg) topically once for 1 dose For physical therapy, iontophoresis, Disp: 30 mL, Rfl: 0    Current Facility-Administered Medications:      heparin 100 UNIT/ML injection 5 mL, 5 mL, Intracatheter, Q8H PRN, Uday Smalls MD, 5 mL at 10/07/20 1204     sodium chloride (PF) 0.9% PF flush 20 mL, 20 mL, Intracatheter, Q8H, Uday Smalls MD, 20 mL at 10/07/20 1204    PHYSICAL EXAMINATION:  VITALS: /75 (BP Location: Right arm, Patient Position: Sitting, Cuff Size: Adult Regular)   Pulse 65   Temp 98.5  F (36.9  C) (Oral)   Resp 16   Wt 108.4 kg (239 lb)   SpO2 98%   BMI 32.41 kg/m     ECOG PS 0.  GENERAL: The patient is a pleasant male in no acute distress.  HEENT: EOMI. Sclerae are anicteric. Oral mucosa is pink and moist.   Lymph: Neck is supple with no lymphadenopathy in the cervical or supraclavicular areas.   Heart: Regular rate and rhythm.   Lungs: Clear to auscultation bilaterally. Normal respiratory effort.  Abdomen: Bowel sounds present, soft, nontender with no palpable hepatosplenomegaly or masses.   Extremities: No lower extremity edema noted bilaterally.  Neuro: Alert and fully oriented, no focal  "deficits.  Psych: Mood and affect normal.  Skin: No rashes, petechiae, or bruising noted on exposed skin.    LABORATORY DATA:   Lab Test 10/07/20  1205 07/13/20  1500 07/11/20  0511 07/10/20  1442 04/08/20  0930   WBC 5.9 5.5 5.2 8.1 10.4   RBC 4.89 4.88 4.45 4.70 4.41   HGB 14.4 14.2 13.0* 13.7 13.3   HCT 43.5 44.2 40.7 43.2 39.5*   MCV 89 91 92 92 90   MCH 29.4 29.1 29.2 29.1 30.2   MCHC 33.1 32.1 31.9 31.7 33.7   RDW 13.8 13.1 13.3 13.3 13.0    213 157 177 226   NEUTROPHIL 55.5 53.6  --   --  73.9    137 141 138 140   POTASSIUM 4.2 3.9 3.8 4.0 4.0   CHLORIDE 110* 105 110* 106 107   CO2 25 24 26 26 28   ANIONGAP 4 8 5 6 5   GLC 91 92 102* 94 102*   BUN 21 16 11 16 20   CR 1.04 1.22 1.08 1.12 1.08   BETHANY 9.3 9.3 8.4* 8.9 9.4   PROTTOTAL 7.6 7.7  --  7.2 7.6   ALBUMIN 4.0 4.0  --  3.8 4.0   BILITOTAL 0.4 0.4  --  0.6 0.5   ALKPHOS 35* 52  --  55 75   AST 15 18  --  16 16   ALT 24 25  --  21 25     Lab Test 10/07/20  1205 07/13/20  1500 08/21/19  1436 07/31/19  1225 07/10/19  1332    165 213 219 218     Lab Test 10/07/20  1205 07/13/20  1500 04/08/20  0930 01/06/20  0858 10/02/19  0955 08/21/19  1436   PSA 0.29 0.42 0.41 0.44 0.58 0.71   CGAB  --  174* 852* 470* 736* 597*   TESTOSTTOTAL  --  13* 8* 16* 16* <2*   CGAB - Chromogranin A; TESTOSTTOTAL: Total testosterone.    IMAGING:  As above.    ASSESSMENT & PLAN: Mr. Reyes is a delightful 60 year old gentleman with recently diagnosed metastatic castration sensitive prostate adenocarcinoma as well as an incidentally diagnosed stage III clear-cell renal cell carcinoma of the right kidney (s/p radical R nephrectomy 3/19/19), who is here for a follow-up visit after completion of docetaxel for prostate cancer.     1. Metastatic prostate cancer, with involvement of the bones and RPLN:   - Patient met the criteria for CHAARTED \"high-volume\" metastatic hormone-sensitive prostate cancer.   - After a review of systemic therapy options, patient opted " to start docetaxel in combination with ADT (per CHAARTED, [id number], STAMPEDE). He tolerated 6 cycles of docetaxel fairly well (5/8/19 through 8/21/19), with the exception of mild fatigue and mild neuropathy.  - Restaging CT CAP and NM Bone Scan from 10/5/20 shows continued partial response compared to pre-chemo baseline. The L5 sclerosis could be due to bone healing especially since pt is asymptomatic and PSA is decreasing.  - PSA continues a gradual down trend with some variability. No clinical evidence of progression. While his PSA is not <0.2 at 7 months, which was shown to predict a better OS in a post-hoc analysis of CHAARTED data (Bri et al, JCO 2018) he does have an intact prostate gland and hence PSA is exceedingly unlikely to become undetectable even if his cancer is very well controlled.  - Continue leuprolide 22.5mg every 3 months - next dose today. Because of Lupron's national shortage, will switch to Eligard 22.5mg today.   - He understands the risks and benefits of ADT including injection site reactions, hot flashes, mood changes and long-term cardiovascular, bone health, etc.    2. Bone metastases:   - Noted to have osseous metastatic disease at the time of diagnosis, which has improved now following 6 cycles of docetaxel and continued ADT.  - Encouraged to continue calcium and vitamin D supplementation.  - Will continue Zometa 4mg IV every 3 months today. No contraindications (last dental procedure in early 2020) and pt advised on side effects including fever, bone pain, hypocalcemia, hypomagnesemia, ONJ, JESSICA, atypical fractures etc. Plan for 2 years total.      3. Stage 3, UISS-high risk ccRCC:  - Patient understands that he has a stage III, UISS high risk clear-cell renal cell carcinoma with approximately 40-50% risk of recurrence after definitive surgery.   - At this time, he has no clear evidence of residual disease. The retroperitoneal lymphadenopathy is more consistent with metastatic  "prostate cancer and is improving/resolved.   - Per the inbasket response from Dr. Cleveland on the bladder dome CT finding - \"Not too worried about this, but probably reasonable to have an office cystoscopy to take a peek at it.\" His team will schedule this.  - Continue active surveillance with self-reporting for signs/symptoms of recurrence (this was previously explained in detail) and periodic H&P and scans.      4. Genetics referral:  - Was referred and seen by Genetics on 3/7/19. No evidence of inherited cancer syndromes found on work-up.      5. Research participation:  - There were no available clinical trials for this gentleman at the time of diagnosis, especially given concurrent malignancy.   - Enrolled in the institutional biobanking study on 2/15/19.     Return to clinic in ~3 months with labs, scans and next doses of Lupron plus Zometa.    BILLIN.     Uday Smalls M.D.  Asst. Professor of Medicine  Division of Hematology, Oncology & Transplantation  [facility]     "

## 2020-10-07 NOTE — NURSING NOTE
Oncology Rooming Note    October 7, 2020 12:18 PM   Walter Reyes is a 60 year old male who presents for:    Chief Complaint   Patient presents with     Port Draw     Labs drawn via PORT by RN in lab. VS taken.      Oncology Clinic Visit     Malignant neoplasm of prostate metastatic to bone (H)     Initial Vitals: /75 (BP Location: Right arm, Patient Position: Sitting, Cuff Size: Adult Regular)   Pulse 65   Temp 98.5  F (36.9  C) (Oral)   Resp 16   Wt 108.4 kg (239 lb)   SpO2 98%   BMI 32.41 kg/m   Estimated body mass index is 32.41 kg/m  as calculated from the following:    Height as of 8/4/20: 1.829 m (6').    Weight as of this encounter: 108.4 kg (239 lb). Body surface area is 2.35 meters squared.  No Pain (0) Comment: Data Unavailable   No LMP for male patient.  Allergies reviewed: Yes  Medications reviewed: Yes    Medications: Medication refills not needed today.  Pharmacy name entered into EPIC:    [facility] - [place], [place] - [address] [id number] - [place], [place] - [address]    Clinical concerns: Patient states he has been experiencing aches and pains on the left rib cage. Dr. Smalls was notified.      Mata Redmond MA

## 2020-10-07 NOTE — PROGRESS NOTES
"Leuprolide (3 Month) (ELIGARD) injection 22.5 mg ordered by Dr. Smalls. Check MAR for documentation. Patient aware of Lupron [place] shortage and \"ok,\" with replacement Eligard. Eligard given in right upper abdomen. Patient tolerated injection well and continued infusion appointment.    -Heather KNUTSON CMA  "

## 2020-10-09 LAB — TESTOST SERPL-MCNC: 12 NG/DL (ref 240–950)

## 2020-10-14 ENCOUNTER — THERAPY VISIT (OUTPATIENT)
Dept: PHYSICAL THERAPY | Facility: CLINIC | Age: 60
End: 2020-10-14
Payer: COMMERCIAL

## 2020-10-14 DIAGNOSIS — M79.672 FOOT PAIN, BILATERAL: ICD-10-CM

## 2020-10-14 DIAGNOSIS — M72.2 PLANTAR FASCIITIS, RIGHT: ICD-10-CM

## 2020-10-14 DIAGNOSIS — M79.671 FOOT PAIN, BILATERAL: ICD-10-CM

## 2020-10-14 DIAGNOSIS — G57.62 MORTON'S NEUROMA, LEFT: ICD-10-CM

## 2020-10-14 DIAGNOSIS — M76.71 PERONEAL TENDONITIS, RIGHT: ICD-10-CM

## 2020-10-14 PROCEDURE — 97140 MANUAL THERAPY 1/> REGIONS: CPT | Mod: GP | Performed by: PHYSICAL THERAPY ASSISTANT

## 2020-10-14 PROCEDURE — 97530 THERAPEUTIC ACTIVITIES: CPT | Mod: GP | Performed by: PHYSICAL THERAPY ASSISTANT

## 2020-10-14 PROCEDURE — 97110 THERAPEUTIC EXERCISES: CPT | Mod: GP | Performed by: PHYSICAL THERAPY ASSISTANT

## 2020-10-15 LAB
RESULT: ABNORMAL
SEND OUTS MISC TEST CODE: ABNORMAL
SEND OUTS MISC TEST SPECIMEN: ABNORMAL
TEST NAME: ABNORMAL

## 2020-10-15 NOTE — PROGRESS NOTES
Subjective:  HPI  Physical Exam                    Objective:  System    Physical Exam    General     ROS    Assessment/Plan:    DISCHARGE REPORT    Progress reporting period is from 8/6/20 to 10/14/20.      SUBJECTIVE  Subjective changes noted by patient:   Patient reports that he has only walked 1500 steps and the ankle and foot are sore..  Sore after just walking this distance.  Patient reports that with the medication that he is on which lowers his testosterone he fatigues quicker and does not feel like he can build up muscle strength.  Questions if that is why he has so much ankle and foot pain because of the medication and the lower testosterone due to his cancer diagnosis.  Patient states that the cancer has decreased with the radiation that he has been getting and was very happy to hear that.  Current pain level is 4/10.       Previous pain level was:       Changes in function:  Yes (See Goal flowsheet attached for changes in current functional level)     Adverse reaction to treatment or activity: None     OBJECTIVE  Changes noted in objective findings:  Yes, patient's progress has varied from treatment to treatment.  Discussed with patient trying different shoewear with more of a cushion in the sole or in the support in the shoe to decrease the amount of pain in the ankle and foot.  Patient states that the change that he has had felt with therapy has been minimal.  Range of motion of his left ankle has stayed the same but is within normal limits.  Patient does have a good home exercise program and continues at home.

## 2020-10-17 NOTE — PROGRESS NOTES
Subjective:  HPI  Physical Exam                    Objective:  System    Physical Exam    General     ROS    Assessment/Plan:    ASSESSMENT/PLAN  Updated problem list and treatment plan:   STG/LTGs have been met:  Yes (See Goal flow sheet completed today.)  Progress toward STG/LTGs have been made:  Yes (See Goal flow sheet completed today.)  Assessment of Progress: The patient's progress has plateaued.  Self Management Plans:  Patient is independent in a home treatment program.  Patient is independent in self management of symptoms.    Walter continues to require the following intervention to meet STG and LT's:  PT intervention is no longer required to meet STG/LTG.    Recommendations:  This patient is ready to be discharged from therapy and continue their home treatment program.    Please refer to the daily flowsheet for treatment today, total treatment time and time spent performing 1:1 timed codes.

## 2021-01-04 ENCOUNTER — HOSPITAL ENCOUNTER (OUTPATIENT)
Dept: NUCLEAR MEDICINE | Facility: CLINIC | Age: 61
Setting detail: NUCLEAR MEDICINE
End: 2021-01-04
Attending: INTERNAL MEDICINE
Payer: COMMERCIAL

## 2021-01-04 ENCOUNTER — HOSPITAL ENCOUNTER (OUTPATIENT)
Dept: CT IMAGING | Facility: CLINIC | Age: 61
End: 2021-01-04
Attending: INTERNAL MEDICINE
Payer: COMMERCIAL

## 2021-01-04 DIAGNOSIS — C64.1 RENAL CELL CARCINOMA, RIGHT (H): ICD-10-CM

## 2021-01-04 DIAGNOSIS — C61 MALIGNANT NEOPLASM OF PROSTATE METASTATIC TO BONE (H): ICD-10-CM

## 2021-01-04 DIAGNOSIS — C79.51 MALIGNANT NEOPLASM OF PROSTATE METASTATIC TO BONE (H): ICD-10-CM

## 2021-01-04 PROCEDURE — 343N000001 HC RX 343: Performed by: INTERNAL MEDICINE

## 2021-01-04 PROCEDURE — 71260 CT THORAX DX C+: CPT

## 2021-01-04 PROCEDURE — A9503 TC99M MEDRONATE: HCPCS | Performed by: INTERNAL MEDICINE

## 2021-01-04 PROCEDURE — 250N000011 HC RX IP 250 OP 636: Performed by: INTERNAL MEDICINE

## 2021-01-04 PROCEDURE — 74177 CT ABD & PELVIS W/CONTRAST: CPT | Mod: 26 | Performed by: RADIOLOGY

## 2021-01-04 PROCEDURE — 250N000011 HC RX IP 250 OP 636: Performed by: RADIOLOGY

## 2021-01-04 PROCEDURE — 78306 BONE IMAGING WHOLE BODY: CPT

## 2021-01-04 PROCEDURE — 71260 CT THORAX DX C+: CPT | Mod: 26 | Performed by: RADIOLOGY

## 2021-01-04 PROCEDURE — 78306 BONE IMAGING WHOLE BODY: CPT | Mod: 26

## 2021-01-04 RX ORDER — HEPARIN SODIUM (PORCINE) LOCK FLUSH IV SOLN 100 UNIT/ML 100 UNIT/ML
100 SOLUTION INTRAVENOUS ONCE
Status: COMPLETED | OUTPATIENT
Start: 2021-01-04 | End: 2021-01-04

## 2021-01-04 RX ORDER — TC 99M MEDRONATE 20 MG/10ML
20-30 INJECTION, POWDER, LYOPHILIZED, FOR SOLUTION INTRAVENOUS ONCE
Status: COMPLETED | OUTPATIENT
Start: 2021-01-04 | End: 2021-01-04

## 2021-01-04 RX ORDER — IOPAMIDOL 755 MG/ML
135 INJECTION, SOLUTION INTRAVASCULAR ONCE
Status: COMPLETED | OUTPATIENT
Start: 2021-01-04 | End: 2021-01-04

## 2021-01-04 RX ADMIN — SODIUM CHLORIDE, PRESERVATIVE FREE 100 ML: 5 INJECTION INTRAVENOUS at 10:03

## 2021-01-04 RX ADMIN — IOPAMIDOL 135 ML: 755 INJECTION, SOLUTION INTRAVENOUS at 09:53

## 2021-01-04 RX ADMIN — TC 99M MEDRONATE 26.2 MCI.: 20 INJECTION, POWDER, LYOPHILIZED, FOR SOLUTION INTRAVENOUS at 09:12

## 2021-01-06 ENCOUNTER — APPOINTMENT (OUTPATIENT)
Dept: LAB | Facility: CLINIC | Age: 61
End: 2021-01-06
Attending: INTERNAL MEDICINE
Payer: COMMERCIAL

## 2021-01-06 ENCOUNTER — INFUSION THERAPY VISIT (OUTPATIENT)
Dept: ONCOLOGY | Facility: CLINIC | Age: 61
End: 2021-01-06
Attending: INTERNAL MEDICINE
Payer: COMMERCIAL

## 2021-01-06 VITALS
SYSTOLIC BLOOD PRESSURE: 119 MMHG | BODY MASS INDEX: 34.45 KG/M2 | DIASTOLIC BLOOD PRESSURE: 81 MMHG | OXYGEN SATURATION: 96 % | RESPIRATION RATE: 14 BRPM | WEIGHT: 254 LBS | HEART RATE: 71 BPM | TEMPERATURE: 98.4 F

## 2021-01-06 DIAGNOSIS — C64.1 RENAL CELL CARCINOMA, RIGHT (H): ICD-10-CM

## 2021-01-06 DIAGNOSIS — C79.51 MALIGNANT NEOPLASM OF PROSTATE METASTATIC TO BONE (H): Primary | ICD-10-CM

## 2021-01-06 DIAGNOSIS — C61 MALIGNANT NEOPLASM OF PROSTATE METASTATIC TO BONE (H): Primary | ICD-10-CM

## 2021-01-06 LAB
ALBUMIN SERPL-MCNC: 4 G/DL (ref 3.4–5)
ALP SERPL-CCNC: 46 U/L (ref 40–150)
ALT SERPL W P-5'-P-CCNC: 36 U/L (ref 0–70)
ANION GAP SERPL CALCULATED.3IONS-SCNC: 5 MMOL/L (ref 3–14)
AST SERPL W P-5'-P-CCNC: 21 U/L (ref 0–45)
BASOPHILS # BLD AUTO: 0 10E9/L (ref 0–0.2)
BASOPHILS NFR BLD AUTO: 0.5 %
BILIRUB SERPL-MCNC: 0.5 MG/DL (ref 0.2–1.3)
BUN SERPL-MCNC: 21 MG/DL (ref 7–30)
CALCIUM SERPL-MCNC: 9.4 MG/DL (ref 8.5–10.1)
CHLORIDE SERPL-SCNC: 108 MMOL/L (ref 94–109)
CO2 SERPL-SCNC: 26 MMOL/L (ref 20–32)
CREAT SERPL-MCNC: 1.14 MG/DL (ref 0.66–1.25)
DIFFERENTIAL METHOD BLD: NORMAL
EOSINOPHIL # BLD AUTO: 0.3 10E9/L (ref 0–0.7)
EOSINOPHIL NFR BLD AUTO: 4.2 %
ERYTHROCYTE [DISTWIDTH] IN BLOOD BY AUTOMATED COUNT: 13.1 % (ref 10–15)
GFR SERPL CREATININE-BSD FRML MDRD: 70 ML/MIN/{1.73_M2}
GLUCOSE SERPL-MCNC: 98 MG/DL (ref 70–99)
HCT VFR BLD AUTO: 40.2 % (ref 40–53)
HGB BLD-MCNC: 13.8 G/DL (ref 13.3–17.7)
IMM GRANULOCYTES # BLD: 0 10E9/L (ref 0–0.4)
IMM GRANULOCYTES NFR BLD: 0.3 %
LDH SERPL L TO P-CCNC: 176 U/L (ref 85–227)
LYMPHOCYTES # BLD AUTO: 1.9 10E9/L (ref 0.8–5.3)
LYMPHOCYTES NFR BLD AUTO: 29.7 %
MCH RBC QN AUTO: 30.7 PG (ref 26.5–33)
MCHC RBC AUTO-ENTMCNC: 34.3 G/DL (ref 31.5–36.5)
MCV RBC AUTO: 90 FL (ref 78–100)
MONOCYTES # BLD AUTO: 0.5 10E9/L (ref 0–1.3)
MONOCYTES NFR BLD AUTO: 8 %
NEUTROPHILS # BLD AUTO: 3.6 10E9/L (ref 1.6–8.3)
NEUTROPHILS NFR BLD AUTO: 57.3 %
NRBC # BLD AUTO: 0 10*3/UL
NRBC BLD AUTO-RTO: 0 /100
PLATELET # BLD AUTO: 219 10E9/L (ref 150–450)
POTASSIUM SERPL-SCNC: 4 MMOL/L (ref 3.4–5.3)
PROT SERPL-MCNC: 7.2 G/DL (ref 6.8–8.8)
PSA SERPL-MCNC: 0.29 UG/L (ref 0–4)
RBC # BLD AUTO: 4.49 10E12/L (ref 4.4–5.9)
SODIUM SERPL-SCNC: 139 MMOL/L (ref 133–144)
WBC # BLD AUTO: 6.2 10E9/L (ref 4–11)

## 2021-01-06 PROCEDURE — 96402 CHEMO HORMON ANTINEOPL SQ/IM: CPT

## 2021-01-06 PROCEDURE — 99214 OFFICE O/P EST MOD 30 MIN: CPT | Performed by: INTERNAL MEDICINE

## 2021-01-06 PROCEDURE — 86316 IMMUNOASSAY TUMOR OTHER: CPT | Performed by: INTERNAL MEDICINE

## 2021-01-06 PROCEDURE — 85025 COMPLETE CBC W/AUTO DIFF WBC: CPT | Performed by: INTERNAL MEDICINE

## 2021-01-06 PROCEDURE — 84403 ASSAY OF TOTAL TESTOSTERONE: CPT | Performed by: INTERNAL MEDICINE

## 2021-01-06 PROCEDURE — 250N000011 HC RX IP 250 OP 636: Performed by: INTERNAL MEDICINE

## 2021-01-06 PROCEDURE — 80053 COMPREHEN METABOLIC PANEL: CPT | Performed by: INTERNAL MEDICINE

## 2021-01-06 PROCEDURE — 258N000003 HC RX IP 258 OP 636: Performed by: INTERNAL MEDICINE

## 2021-01-06 PROCEDURE — 36593 DECLOT VASCULAR DEVICE: CPT

## 2021-01-06 PROCEDURE — 96365 THER/PROPH/DIAG IV INF INIT: CPT

## 2021-01-06 PROCEDURE — 250N000011 HC RX IP 250 OP 636: Performed by: PHYSICIAN ASSISTANT

## 2021-01-06 PROCEDURE — 83615 LACTATE (LD) (LDH) ENZYME: CPT | Performed by: INTERNAL MEDICINE

## 2021-01-06 PROCEDURE — 84153 ASSAY OF PSA TOTAL: CPT | Performed by: INTERNAL MEDICINE

## 2021-01-06 PROCEDURE — G0463 HOSPITAL OUTPT CLINIC VISIT: HCPCS

## 2021-01-06 RX ORDER — ZOLEDRONIC ACID 0.04 MG/ML
4 INJECTION, SOLUTION INTRAVENOUS ONCE
Status: CANCELLED | OUTPATIENT
Start: 2021-01-13 | End: 2021-01-13

## 2021-01-06 RX ORDER — HEPARIN SODIUM (PORCINE) LOCK FLUSH IV SOLN 100 UNIT/ML 100 UNIT/ML
5 SOLUTION INTRAVENOUS
Status: CANCELLED | OUTPATIENT
Start: 2021-01-13

## 2021-01-06 RX ORDER — HEPARIN SODIUM,PORCINE 10 UNIT/ML
5 VIAL (ML) INTRAVENOUS
Status: CANCELLED | OUTPATIENT
Start: 2021-01-13

## 2021-01-06 RX ORDER — HEPARIN SODIUM (PORCINE) LOCK FLUSH IV SOLN 100 UNIT/ML 100 UNIT/ML
5 SOLUTION INTRAVENOUS
Status: DISCONTINUED | OUTPATIENT
Start: 2021-01-06 | End: 2021-01-06 | Stop reason: HOSPADM

## 2021-01-06 RX ORDER — HEPARIN SODIUM (PORCINE) LOCK FLUSH IV SOLN 100 UNIT/ML 100 UNIT/ML
5 SOLUTION INTRAVENOUS ONCE
Status: COMPLETED | OUTPATIENT
Start: 2021-01-06 | End: 2021-01-06

## 2021-01-06 RX ORDER — ZOLEDRONIC ACID 0.04 MG/ML
4 INJECTION, SOLUTION INTRAVENOUS ONCE
Status: COMPLETED | OUTPATIENT
Start: 2021-01-06 | End: 2021-01-06

## 2021-01-06 RX ADMIN — LEUPROLIDE ACETATE 22.5 MG: KIT SUBCUTANEOUS at 14:36

## 2021-01-06 RX ADMIN — Medication 5 ML: at 14:56

## 2021-01-06 RX ADMIN — Medication 5 ML: at 11:53

## 2021-01-06 RX ADMIN — ALTEPLASE 2 MG: 2.2 INJECTION, POWDER, LYOPHILIZED, FOR SOLUTION INTRAVENOUS at 13:34

## 2021-01-06 RX ADMIN — SODIUM CHLORIDE 250 ML: 9 INJECTION, SOLUTION INTRAVENOUS at 14:34

## 2021-01-06 RX ADMIN — ZOLEDRONIC ACID 4 MG: 0.04 INJECTION, SOLUTION INTRAVENOUS at 14:34

## 2021-01-06 ASSESSMENT — PAIN SCALES - GENERAL: PAINLEVEL: NO PAIN (0)

## 2021-01-06 NOTE — PATIENT INSTRUCTIONS
[facility] Triage and after hours / weekends / holidays:  [phone]    Please call the triage or after hours line if you experience a temperature greater than or equal to 100.5, shaking chills, have uncontrolled nausea, vomiting and/or diarrhea, dizziness, shortness of breath, chest pain, bleeding, unexplained bruising, or if you have any other new/concerning symptoms, questions or concerns.      If you are having any concerning symptoms or wish to speak to a provider before your next infusion visit, please call your care coordinator or triage to notify them so we can adequately serve you.     If you need a refill on a narcotic prescription or other medication, please call before your infusion appointment.           January 2021 Sunday Monday Tuesday Wednesday Thursday Friday Saturday                            1     2       3     4    NM INJ   9:00 AM   (15 min.)   [id number]   [facility]    CT CHEST/ABDOMEN/PELVIS W   9:20 AM   (20 min.)   [id number]   [facility]    NM BONE SCAN WHOLE BODY  12:00 PM   (60 min.)   UUNM3   [facility] 5     6    [facility] DRAW  11:45 AM   (15 min.)   [facility]   [facility] RETURN  12:15 PM   (30 min.)   Uday Smalls MD   [facility] ONC INFUSION 30   1:00 PM   (30 min.)   [facility]   [facility] 7     8     9       10     11     12     13     14     15     16       17     18     19     20     21     22     23       24     25     26     27     28     29     30       31                                               February 2021 Sunday Monday Tuesday Wednesday Thursday Friday Saturday        1     2     3     4     5     6       7     8     9     10     11     12     13       14     15     16     17     18     19     20       21     22     23     24     25     26     27  Happy Birthday!       28                                                   Recent Results (from the past 24 hour(s))   PSA tumor marker    Collection Time: 01/06/21 12:13 PM   Result Value Ref Range    PSA 0.29 0 - 4 ug/L   Lactate Dehydrogenase    Collection Time: 01/06/21 12:13 PM   Result Value Ref Range    Lactate Dehydrogenase 176 85 - 227 U/L   Comprehensive metabolic panel    Collection Time: 01/06/21 12:13 PM   Result Value Ref Range    Sodium 139 133 - 144 mmol/L    Potassium 4.0 3.4 - 5.3 mmol/L    Chloride 108 94 - 109 mmol/L    Carbon Dioxide 26 20 - 32 mmol/L    Anion Gap 5 3 - 14 mmol/L    Glucose 98 70 - 99 mg/dL    Urea Nitrogen 21 7 - 30 mg/dL    Creatinine 1.14 0.66 - 1.25 mg/dL    GFR Estimate 70 >60 mL/min/[1.73_m2]    GFR Estimate If Black 81 >60 mL/min/[1.73_m2]    Calcium 9.4 8.5 - 10.1 mg/dL    Bilirubin Total 0.5 0.2 - 1.3 mg/dL    Albumin 4.0 3.4 - 5.0 g/dL    Protein Total 7.2 6.8 - 8.8 g/dL    Alkaline Phosphatase 46 40 - 150 U/L    ALT 36 0 - 70 U/L    AST 21 0 - 45 U/L   CBC with platelets differential    Collection Time: 01/06/21 12:13 PM   Result Value Ref Range    WBC 6.2 4.0 - 11.0 10e9/L    RBC Count 4.49 4.4 - 5.9 10e12/L    Hemoglobin 13.8 13.3 - 17.7 g/dL    Hematocrit 40.2 40.0 - 53.0 %    MCV 90 78 - 100 fl    MCH 30.7 26.5 - 33.0 pg    MCHC 34.3 31.5 - 36.5 g/dL    RDW 13.1 10.0 - 15.0 %    Platelet Count 219 150 - 450 10e9/L    Diff Method Automated Method     % Neutrophils 57.3 %    % Lymphocytes 29.7 %    % Monocytes 8.0 %    % Eosinophils 4.2 %    % Basophils 0.5 %    % Immature Granulocytes 0.3 %    Nucleated RBCs 0 0 /100    Absolute Neutrophil 3.6 1.6 - 8.3 10e9/L    Absolute Lymphocytes 1.9 0.8 - 5.3 10e9/L    Absolute Monocytes 0.5 0.0 - 1.3 10e9/L    Absolute Eosinophils 0.3 0.0 - 0.7 10e9/L    Absolute Basophils 0.0 0.0 - 0.2 10e9/L    Abs Immature Granulocytes 0.0 0 - 0.4 10e9/L    Absolute Nucleated RBC 0.0

## 2021-01-06 NOTE — PROGRESS NOTES
Infusion Nursing Note:  Walter Reyes presents today for zometa, eligard.    Patient seen by provider today: Yes: Dr Smalls   present during visit today: Not Applicable.    Note: N/A.    Intravenous Access:  Implanted Port.  Port flushed with ease but had no blood return. Blood was drawn peripherally in the lab today.  TPA instilled in Port and withdrawn after 60 minutes with brisk blood return from port.      Treatment Conditions:  Lab Results   Component Value Date    HGB 13.8 01/06/2021     Lab Results   Component Value Date    WBC 6.2 01/06/2021      Lab Results   Component Value Date    ANEU 3.6 01/06/2021     Lab Results   Component Value Date     01/06/2021      Lab Results   Component Value Date     01/06/2021                   Lab Results   Component Value Date    POTASSIUM 4.0 01/06/2021           No results found for: MAG         Lab Results   Component Value Date    CR 1.14 01/06/2021                   Lab Results   Component Value Date    BETHANY 9.4 01/06/2021                Lab Results   Component Value Date    BILITOTAL 0.5 01/06/2021           Lab Results   Component Value Date    ALBUMIN 4.0 01/06/2021                    Lab Results   Component Value Date    ALT 36 01/06/2021           Lab Results   Component Value Date    AST 21 01/06/2021       Results reviewed, labs MET treatment parameters, ok to proceed with treatment.      Post Infusion Assessment:  Patient tolerated infusion without incident.  Patient tolerated injection without incident. Eligard given subcutaneously by Emilee Boateng CMA into RIGHT abdomen  Blood return noted pre and post infusion after TPA  Site patent and intact, free from redness, edema or discomfort.  No evidence of extravasations.  Access discontinued per protocol.       Discharge Plan:   Patient declined prescription refills.  Discharge instructions reviewed with: Patient.  Patient and/or family verbalized understanding of discharge  instructions and all questions answered.  AVS to patient via [employer].  Patient will return in 3 months for next appointment. Scheduling pending at the time of discharge. Patient will watch for appointments in [employer] and call if they don't get scheduled.   Patient discharged in stable condition accompanied by: self.  Departure Mode: Ambulatory.  Face to Face time: 0.    Gayle Chapa RN

## 2021-01-06 NOTE — LETTER
"    1/6/2021         RE: Walter Reyes  [address] [facility] [place] [address]        Dear Colleague,    Thank you for referring your patient, Walter Reyes, to the [facility]. Please see a copy of my visit note below.    MEDICAL ONCOLOGY FOLLOW-UP NOTE    REFERRING PROVIDER: Jorge Alberto Yepez MD at [facility].    REASON FOR VISIT: Follow-up for metastatic hormone-sensitive prostate cancer, currently on ADT alone.    HISTORY OF PRESENT ILLNESS: Mr. 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 His oncologic history is detailed below.     Walter is doing ok but struggling with weight gain, plantar fasciitis and loss of muscle bulk. Energy levels are suboptimal and hot flashes are stable. Libido is non-existent. No recurrence of grade 1-2 PSN in hands/feet since after a 21-mile hike down the [place] in Dec 2019. No signs/symptoms of disease progression including hematuria etc.     ONCOLOGIC HISTORY:  1. 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 Naylor at [facility]. Prior PSA were 1.4 on 8/16/17, 2.4 on 6/28/16, 2.9 on 6/28/16, and as low as 0.4 on 3/26/2003.   - 1/08/2019: Consultation with Sarah Wilson CNP in Urology clinic. Repeat PSA 9.6.  - 1/16/2019: MRI prostate with contrast - \"This examination is characterized as PIRADS 5- very high probability. Clinically significant cancer is highly likely to be present. There is a large, invasive mass arising from the right peripheral zone and extending into the neurovascular bundle, seminal vesicle, and along the anterolateral right mesorectal fascia. Metastatic " "right external iliac lymph node. Metastatic lesion in the posterior/superior right acetabulum.\"  - 1/25/2019: CT abdomen and pelvis with IV contrast - \"1. Heterogeneous enhancing mass posteriorly in the upper pole of the right kidney measures 4.5 x 5.8 x 5.7 cm (AP by transverse by craniocaudal). It has a small nodular component extending posterior medially which abuts the right psoas muscle. This nodular extension measures 2.1 x 2.1 cm. Minimal stranding about the mass. No definite thrombus within the right renal vein. This renal mass is compatible with a renal cell carcinoma. A paraaortic lymph node situated immediately posterior to the left renal vein measures 1.1 cm in short axis, suspicious for metastasis. 2. A 2 cm right external iliac lymph node is also suspicious for metastases. 3. Multiple sclerotic osseous lesions suspicious for metastases. These include 0.8 and 0.6 cm sclerotic lesions laterally in the right iliac wing (images 53 and 62 respectively). Ill-defined groundglass density laterally in the right acetabulum measuring 1.7 x 1.2 cm corresponds with the lesion identified on MRI. A 0.4 cm groundglass density in the left acetabulum. Sclerotic lesion in the left femoral neck measures 0.9 x 1.6 cm (image 81). Sclerotic metastases would be more compatible with prostate metastases. 4. A 3 subcentimeter hepatic lesions are indeterminate. Metastases would be a consideration. These can be further characterized with liver MRI.\"  - 1/25/2019: NM bone scan - \"There is focal bony uptake in the left femoral neck, right acetabulum, right of midline at the S1 or L5 level of the spine, within multiple bilateral ribs, in the C7 or T1 level of the spine, and anteriorly within the skull. Findings are suspicious for metastases.\"  - 1/31/19: CT chest with contrast - \" No suspicious nodules in the chest.  Stable appearance of a 5.8 cm right renal mass concerning for renal carcinoma until proven otherwise.  Stable " "indeterminant subcentimeter hypodensities in the liver.  Multifocal osteoblastic metastasis including 2.5 cm lesion in the right fifth rib posteriorly and a 1.6 cm lesion in the right third rib posteriorly. No lytic lesions identified.\"  - 2/6/19: CT-guided right sclerotic fifth rib lesion biopsy - \"Metastatic carcinoma, consistent with prostate primary.  Immunohistochemical stains performed show the metastatic carcinoma stains positive for NKX3.1 (prostate marker), negative for STACIE 3 and PAX8, which supports the above diagnosis.\"  - 2/15/19: Started Casodex 50mg every day and consented for the biobanking protocol. 3/18/19 - stopped Casodex.  - 2/19/19: Case discussed in tumor board - recommendation for nephectomy for suspected malignant right renal mass.   - 2/26/19: Started Lupron 22.5mg every 3 months.   - 5/8/19: Started Docetaxel 75mg/m2 IV every 3 weeks. 06/18/19 - cycle 3, 7/10/19 - cycle 4, 07/31/19 - cycle 5, 08/21/19 - cycle 6.   - 10/2/19: Restaging CT CAP and NM Bone Scan showed improved osseous disease, no evidence of recurrent or new metastatic disease.  - 1/5/20: Restaging CT CAP and NM Bone Scan showed stable osseous disease, no evidence of recurrent or new metastatic disease.  - 4/6/20: NM bone scan with slightly improved uptake in known skeletal mets. CT C/A/P with contrast with stable osseous mets, no yusuf enlargement, no new visceral mets etc.  - 04/08/20: Zometa every 3 months.  - 10/5/20: CT C/A/P with contrast and NM bone scan - SD.   - 1/4/21: CT C/A/P with contrast and NM bone scan - SD but slight increase in right 5th rib tracer uptake and in posterior L5 sclerosis.      2. Stage III ([id number]), grade 3 of 4, clear-cell RCC of right kidney:  - Incidentally diagnosed as above.   - Underwent curative-intent robotic right radical nephrectomy with Dr. Wesley Cleveland on 3/19/19. No tumor spillage per op report.   - Path showed: \"Histologic Type: Clear cell renal cell carcinoma; Sarcomatoid " "Features: Not identified; Histologic Grade: Nucleolar grade 3 (WHO/ISUP). Extent Tumor Size: 4.3 cm. Microscopic Tumor Extension: Tumor extension into renal sinus (in vascular structures). Margins: Negative. Tumor Necrosis: Present; focal. Lymph-Vascular Invasion: Not identified.  Pathologic Staging (pTNM) Primary Tumor (pT): pT3a: Tumor extends into renal vein branches/renal sinus. Regional Lymph Nodes (pN): pNX. Number of Lymph Nodes Examined: 0 Distant Metastasis (pM): pM N/A.\"  - Restaging scans as above.    PAST MEDICAL HISTORY:  Past Medical History:   Diagnosis Date     Cancer of kidney, right (H)      Complication of anesthesia     slow wakeup      Malignant neoplasm of prostate metastatic to bone (H) 2/14/2019     Thyroid disease      PAST SURGICAL HISTORY:   Past Surgical History:   Procedure Laterality Date     CYSTOSCOPY      about 10 years ago     INSERT PORT VASCULAR ACCESS Right 5/2/2019    Procedure: Chest Port Placement;  Surgeon: Tate Burciaga PA-C;  Location: [facility] OR     IR CHEST PORT PLACEMENT > 5 YRS OF AGE  5/2/2019     LAPAROSCOPIC NEPHRECTOMY Right 3/19/2019    Procedure: Right laparoscopic radical nephrectomy;  Surgeon: Wesley Cleveland MD;  Location:  OR      SOCIAL HISTORY:   Social History     Tobacco Use     Smoking status: Never Smoker     Smokeless tobacco: Never Used   Substance Use Topics     Alcohol use: None     Comment: wine - very rare     Drug use: No     FAMILY HISTORY:   Family History   Problem Relation Age of Onset     Diabetes Father      ALLERGIES:   Allergies   Allergen Reactions     Doxycycline GI Disturbance     CURRENT MEDICATIONS:   Current Outpatient Medications:      amLODIPine (NORVASC) 10 MG tablet, Take 1 tablet (10 mg) by mouth daily, Disp: 30 tablet, Rfl: 0     atorvastatin (LIPITOR) 20 MG tablet, Take 20 mg by mouth daily, Disp: , Rfl:      calcium citrate-vitamin D (CITRACAL) 315-250 MG-UNIT TABS per tablet, Take 650 mg by mouth 2 times " daily , Disp: , Rfl:      co-enzyme Q-10 100 MG CAPS capsule, Take 100 mg by mouth daily , Disp: , Rfl:      cycloSPORINE (RESTASIS) 0.05 % ophthalmic emulsion, Place 1 drop into both eyes 2 times daily , Disp: , Rfl:      fluticasone (FLONASE) 50 MCG/ACT nasal spray, Spray 2 sprays in nostril daily as needed , Disp: , Rfl:      Glucosamine-Chondroit-Vit C-Mn (GLUCOSAMINE 1500 COMPLEX) CAPS, Take 1 capsule by mouth daily, Disp: , Rfl:      levothyroxine (SYNTHROID/LEVOTHROID) 100 MCG tablet, Take 100 mcg by mouth daily, Disp: , Rfl:      MAGNESIUM PO, , Disp: , Rfl:      Multiple Vitamins-Minerals (MULTIVITAMIN ADULT EXTRA C PO), Take 1 tablet by mouth every 24 hours, Disp: , Rfl:      Nutritional Supplements (SALMON OIL) CAPS, Take 2 capsules by mouth daily , Disp: , Rfl:      omeprazole (PRILOSEC) 20 MG DR capsule, Take 20 mg by mouth daily, Disp: , Rfl:      tamsulosin (FLOMAX) 0.4 MG capsule, Take 1 capsule (0.4 mg) by mouth daily, Disp: 30 capsule, Rfl: 3     cetirizine (ZYRTEC) 10 MG tablet, Take 10 mg by mouth as needed for allergies , Disp: , Rfl:      dexamethasone (DECADRON) 4 MG/ML injection, Apply 1 mL (4 mg) topically once for 1 dose For physical therapy, iontophoresis, Disp: 30 mL, Rfl: 0     diclofenac (VOLTAREN) 1 % topical gel, Place 2 g onto the skin 4 times daily (Patient not taking: Reported on 1/6/2021), Disp: 100 g, Rfl: 1  No current facility-administered medications for this visit.     Facility-Administered Medications Ordered in Other Visits:      0.9% sodium chloride BOLUS, 250 mL, Intravenous, Once, Uday Smalls MD     alteplase (CATHFLO ACTIVASE) injection 2 mg, 2 mg, Intracatheter, Once, Uday Smalls MD     leuprolide (3 Month) (ELIGARD) injection 22.5 mg, 22.5 mg, Subcutaneous, Q90 Days, Uday Smalls MD     zoledronic acid (ZOMETA) intermittent infusion 4 mg, 4 mg, Intravenous, Once, Nayla Murray PA-C    PHYSICAL EXAMINATION:  VITALS: /81 (BP Location: Right arm, Patient  Position: Sitting, Cuff Size: Adult Regular)   Pulse 71   Temp 98.4  F (36.9  C) (Oral)   Resp 14   Wt 115.2 kg (254 lb)   SpO2 96%   BMI 34.45 kg/m     ECOG PS 0.  GENERAL: The patient is a pleasant male in no acute distress.  HEENT: EOMI. Sclerae are anicteric. Oral mucosa is pink and moist.   Lymph: Neck is supple with no lymphadenopathy in the cervical or supraclavicular areas.   Heart: Regular rate and rhythm.   Lungs: Clear to auscultation bilaterally. Normal respiratory effort.  Abdomen: Bowel sounds present, soft, nontender with no palpable hepatosplenomegaly or masses.   Extremities: No lower extremity edema noted bilaterally. Muscle bulk decreased generally.  Neuro: Alert and fully oriented, no focal deficits.  Psych: Mood and affect normal.    LABORATORY DATA:   Lab Test 01/06/21  1213 10/07/20  1205 07/13/20  1500   WBC 6.2 5.9 5.5   RBC 4.49 4.89 4.88   HGB 13.8 14.4 14.2   HCT 40.2 43.5 44.2   MCV 90 89 91   MCH 30.7 29.4 29.1   MCHC 34.3 33.1 32.1   RDW 13.1 13.8 13.1    215 213   NEUTROPHIL 57.3 55.5 53.6    139 137   POTASSIUM 4.0 4.2 3.9   CHLORIDE 108 110* 105   CO2 26 25 24   ANIONGAP 5 4 8   GLC 98 91 92   BUN 21 21 16   CR 1.14 1.04 1.22   BETHANY 9.4 9.3 9.3   PROTTOTAL 7.2 7.6 7.7   ALBUMIN 4.0 4.0 4.0   BILITOTAL 0.5 0.4 0.4   ALKPHOS 46 35* 52   AST 21 15 18   ALT 36 24 25     Lab Test 01/06/21  1213 10/07/20  1205 07/13/20  1500 08/21/19  1436 07/31/19  1225    170 165 213 219     Lab Test 01/06/21  1213 10/07/20  1205 07/13/20  1500 04/08/20  0930 01/06/20  0858 10/02/19  0955 08/21/19  1436   PSA 0.29 0.29 0.42 0.41 0.44 0.58 0.71   CGAB  --   --  174* 852* 470* 736* 597*   TESTOSTTOTAL  --  12* 13* 8* 16* 16* <2*   CGAB - Chromogranin A; TESTOSTTOTAL: Total testosterone.    IMAGING:  As above.    ASSESSMENT & PLAN: Mr. Reyes is a delightful 60 year old gentleman with recently diagnosed metastatic castration sensitive prostate adenocarcinoma as well as an  "incidentally diagnosed stage III clear-cell renal cell carcinoma of the right kidney (s/p radical R nephrectomy 3/19/19), who is here for a follow-up visit after completion of docetaxel for prostate cancer.     1. Metastatic prostate cancer, with involvement of the bones and RPLN:   - Patient met the criteria for CHAARTED \"high-volume\" metastatic hormone-sensitive prostate cancer. He opted for docetaxel in combination with ADT (per CHAARTED, [id number], STAMPEDE). Tolerated 6 cycles of docetaxel fairly well (5/8/19 through 8/21/19), with the exception of mild fatigue and mild neuropathy. Now on ADT and tolerating fairly well.  - Restaging CT CAP and NM Bone Scan from Jan 2021 shows continued partial response compared to pre-chemo baseline. The L5 sclerosis and increased rib uptake with stabilizing PSA could be early disease progression versus continued response.   - No clinical evidence of progression. While his PSA is not <0.2 at 7 months, which was shown to predict a better OS in a [facility] (Bri et al, JCO 2018) he does have an intact prostate gland and hence PSA is exceedingly unlikely to become undetectable even if his cancer is very well controlled.  - Continue leuprolide 22.5mg every 3 months - next dose today. Because of Lupron's national shortage, will continue Eligard 22.5mg today.   - He understands the risks and benefits of ADT including injection site reactions, hot flashes, mood changes and long-term cardiovascular, bone health, etc.  - Reviewed the role of cutting carbs, decreasing weight, doing resistance+aerobic exercises to reverse the skeletal side effects of ADT in detail.     2. Bone metastases:   - Noted to have osseous metastatic disease at the time of diagnosis, which has improved now following 6 cycles of docetaxel and continued ADT.  - Encouraged to continue calcium and vitamin D supplementation.  - Will continue Zometa 4mg IV every 3 months today. No " contraindications (last dental procedure in early ) and pt advised on side effects including fever, bone pain, hypocalcemia, hypomagnesemia, ONJ, JESSICA, atypical fractures etc. Reassess for continued therapy in 2022.     3. Stage 3, UISS-high risk ccRCC:  - Patient understands that he has a stage III, UISS high risk clear-cell renal cell carcinoma with approximately 40-50% risk of recurrence after definitive surgery.   - At this time, he has no clear evidence of residual disease. The retroperitoneal lymphadenopathy is more consistent with metastatic prostate cancer and is improving/resolved.   - Continue active surveillance with self-reporting for signs/symptoms of recurrence (this was previously explained in detail) and periodic H&P and scans.      4. Genetics referral:  - Was referred and seen by Genetics on 3/7/19. No evidence of inherited cancer syndromes found on work-up.      5. Research participation:  - There were no available clinical trials for this gentleman at the time of diagnosis, especially given concurrent malignancy.   - Enrolled in the institutional biobanking study on 2/15/19.     Return to clinic in ~3 months with labs, scans and next doses of Eligard plus Zometa.    BILLIN.     Uday Smalls M.D.  . Professor of Medicine  Genitourinary Oncology  Division of Hematology, Oncology & Transplantation  [facility]

## 2021-01-06 NOTE — NURSING NOTE
Oncology Rooming Note    January 6, 2021 12:45 PM   Walter Reyes is a 60 year old male who presents for:    Chief Complaint   Patient presents with     Port Draw     Labs drawn from  by RN in lab. Vitals taken. Checked into next appointment.      Oncology Clinic Visit     PROSTATE CANCER      Initial Vitals: /81 (BP Location: Right arm, Patient Position: Sitting, Cuff Size: Adult Regular)   Pulse 71   Temp 98.4  F (36.9  C) (Oral)   Resp 14   Wt 115.2 kg (254 lb)   SpO2 96%   BMI 34.45 kg/m   Estimated body mass index is 34.45 kg/m  as calculated from the following:    Height as of 8/4/20: 1.829 m (6').    Weight as of this encounter: 115.2 kg (254 lb). Body surface area is 2.42 meters squared.  No Pain (0) Comment: Data Unavailable   No LMP for male patient.  Allergies reviewed: Yes  Medications reviewed: Yes    Medications: Medication refills not needed today.  Pharmacy name entered into EPIC:    [facility] - [place], [place] - [address] [id number] - [place], [place] - [address]    Clinical concerns: No new concerns today  Dr Smalls  was notified.      Evangelina Penn

## 2021-01-06 NOTE — NURSING NOTE
Chief Complaint   Patient presents with     Port Draw     Labs drawn from  by RN in lab. Vitals taken. Checked into next appointment.      Port accessed with 20 gauge gripper needle by RN in lab. Unable to get enough blood return, so patient's labs drawn from . Port flushed with saline and heparin.  Vital signs taken.  Pt checked in to next appointment.    Alis Trujillo RN

## 2021-01-06 NOTE — PROGRESS NOTES
"MEDICAL ONCOLOGY FOLLOW-UP NOTE    REFERRING PROVIDER: Jorge Alberto Yepez MD at [facility].    REASON FOR VISIT: Follow-up for metastatic hormone-sensitive prostate cancer, currently on ADT alone.    HISTORY OF PRESENT ILLNESS: Mr. 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 His oncologic history is detailed below.     Walter is doing ok but struggling with weight gain, plantar fasciitis and loss of muscle bulk. Energy levels are suboptimal and hot flashes are stable. Libido is non-existent. No recurrence of grade 1-2 PSN in hands/feet since after a 21-mile hike down the [facility] [place] in Dec 2019. No signs/symptoms of disease progression including hematuria etc.     ONCOLOGIC HISTORY:  1. 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 Naylor at [facility]. Prior PSA were 1.4 on 8/16/17, 2.4 on 6/28/16, 2.9 on 6/28/16, and as low as 0.4 on 3/26/2003.   - 1/08/2019: Consultation with Sarah Wilson CNP in Urology clinic. Repeat PSA 9.6.  - 1/16/2019: MRI prostate with contrast - \"This examination is characterized as PIRADS 5- very high probability. Clinically significant cancer is highly likely to be present. There is a large, invasive mass arising from the right peripheral zone and extending into the neurovascular bundle, seminal vesicle, and along the anterolateral right mesorectal fascia. Metastatic right external iliac lymph node. Metastatic lesion in the posterior/superior right acetabulum.\"  - 1/25/2019: CT abdomen and pelvis with IV contrast - \"1. Heterogeneous enhancing mass posteriorly in the upper pole of the right kidney measures 4.5 x 5.8 x 5.7 cm (AP by " "transverse by craniocaudal). It has a small nodular component extending posterior medially which abuts the right psoas muscle. This nodular extension measures 2.1 x 2.1 cm. Minimal stranding about the mass. No definite thrombus within the right renal vein. This renal mass is compatible with a renal cell carcinoma. A paraaortic lymph node situated immediately posterior to the left renal vein measures 1.1 cm in short axis, suspicious for metastasis. 2. A 2 cm right external iliac lymph node is also suspicious for metastases. 3. Multiple sclerotic osseous lesions suspicious for metastases. These include 0.8 and 0.6 cm sclerotic lesions laterally in the right iliac wing (images 53 and 62 respectively). Ill-defined groundglass density laterally in the right acetabulum measuring 1.7 x 1.2 cm corresponds with the lesion identified on MRI. A 0.4 cm groundglass density in the left acetabulum. Sclerotic lesion in the left femoral neck measures 0.9 x 1.6 cm (image 81). Sclerotic metastases would be more compatible with prostate metastases. 4. A 3 subcentimeter hepatic lesions are indeterminate. Metastases would be a consideration. These can be further characterized with liver MRI.\"  - 1/25/2019: NM bone scan - \"There is focal bony uptake in the left femoral neck, right acetabulum, right of midline at the S1 or L5 level of the spine, within multiple bilateral ribs, in the C7 or T1 level of the spine, and anteriorly within the skull. Findings are suspicious for metastases.\"  - 1/31/19: CT chest with contrast - \" No suspicious nodules in the chest.  Stable appearance of a 5.8 cm right renal mass concerning for renal carcinoma until proven otherwise.  Stable indeterminant subcentimeter hypodensities in the liver.  Multifocal osteoblastic metastasis including 2.5 cm lesion in the right fifth rib posteriorly and a 1.6 cm lesion in the right third rib posteriorly. No lytic lesions identified.\"  - 2/6/19: CT-guided right sclerotic " "fifth rib lesion biopsy - \"Metastatic carcinoma, consistent with prostate primary.  Immunohistochemical stains performed show the metastatic carcinoma stains positive for NKX3.1 (prostate marker), negative for STACIE 3 and PAX8, which supports the above diagnosis.\"  - 2/15/19: Started Casodex 50mg every day and consented for the biobanking protocol. 3/18/19 - stopped Casodex.  - 2/19/19: Case discussed in tumor board - recommendation for nephectomy for suspected malignant right renal mass.   - 2/26/19: Started Lupron 22.5mg every 3 months.   - 5/8/19: Started Docetaxel 75mg/m2 IV every 3 weeks. 06/18/19 - cycle 3, 7/10/19 - cycle 4, 07/31/19 - cycle 5, 08/21/19 - cycle 6.   - 10/2/19: Restaging CT CAP and NM Bone Scan showed improved osseous disease, no evidence of recurrent or new metastatic disease.  - 1/5/20: Restaging CT CAP and NM Bone Scan showed stable osseous disease, no evidence of recurrent or new metastatic disease.  - 4/6/20: NM bone scan with slightly improved uptake in known skeletal mets. CT C/A/P with contrast with stable osseous mets, no yusuf enlargement, no new visceral mets etc.  - 04/08/20: Zometa every 3 months.  - 10/5/20: CT C/A/P with contrast and NM bone scan - SD.   - 1/4/21: CT C/A/P with contrast and NM bone scan - SD but slight increase in right 5th rib tracer uptake and in posterior L5 sclerosis.      2. Stage III ([id number]), grade 3 of 4, clear-cell RCC of right kidney:  - Incidentally diagnosed as above.   - Underwent curative-intent robotic right radical nephrectomy with Dr. Wesley Cleveland on 3/19/19. No tumor spillage per op report.   - Path showed: \"Histologic Type: Clear cell renal cell carcinoma; Sarcomatoid Features: Not identified; Histologic Grade: Nucleolar grade 3 (WHO/ISUP). Extent Tumor Size: 4.3 cm. Microscopic Tumor Extension: Tumor extension into renal sinus (in vascular structures). Margins: Negative. Tumor Necrosis: Present; focal. Lymph-Vascular Invasion: Not " "identified.  Pathologic Staging (pTNM) Primary Tumor (pT): pT3a: Tumor extends into renal vein branches/renal sinus. Regional Lymph Nodes (pN): pNX. Number of Lymph Nodes Examined: 0 Distant Metastasis (pM): pM N/A.\"  - Restaging scans as above.    PAST MEDICAL HISTORY:  Past Medical History:   Diagnosis Date     Cancer of kidney, right (H)      Complication of anesthesia     slow wakeup      Malignant neoplasm of prostate metastatic to bone (H) 2/14/2019     Thyroid disease      PAST SURGICAL HISTORY:   Past Surgical History:   Procedure Laterality Date     CYSTOSCOPY      about 10 years ago     INSERT PORT VASCULAR ACCESS Right 5/2/2019    Procedure: Chest Port Placement;  Surgeon: Tate Burciaga PA-C;  Location: [facility] OR     IR CHEST PORT PLACEMENT > 5 YRS OF AGE  5/2/2019     LAPAROSCOPIC NEPHRECTOMY Right 3/19/2019    Procedure: Right laparoscopic radical nephrectomy;  Surgeon: Wesley Cleveland MD;  Location: [facility] OR      SOCIAL HISTORY:   Social History     Tobacco Use     Smoking status: Never Smoker     Smokeless tobacco: Never Used   Substance Use Topics     Alcohol use: None     Comment: wine - very rare     Drug use: No     FAMILY HISTORY:   Family History   Problem Relation Age of Onset     Diabetes Father      ALLERGIES:   Allergies   Allergen Reactions     Doxycycline GI Disturbance     CURRENT MEDICATIONS:   Current Outpatient Medications:      amLODIPine (NORVASC) 10 MG tablet, Take 1 tablet (10 mg) by mouth daily, Disp: 30 tablet, Rfl: 0     atorvastatin (LIPITOR) 20 MG tablet, Take 20 mg by mouth daily, Disp: , Rfl:      calcium citrate-vitamin D (CITRACAL) 315-250 MG-UNIT TABS per tablet, Take 650 mg by mouth 2 times daily , Disp: , Rfl:      co-enzyme Q-10 100 MG CAPS capsule, Take 100 mg by mouth daily , Disp: , Rfl:      cycloSPORINE (RESTASIS) 0.05 % ophthalmic emulsion, Place 1 drop into both eyes 2 times daily , Disp: , Rfl:      fluticasone (FLONASE) 50 MCG/ACT nasal spray, " Spray 2 sprays in nostril daily as needed , Disp: , Rfl:      Glucosamine-Chondroit-Vit C-Mn (GLUCOSAMINE 1500 COMPLEX) CAPS, Take 1 capsule by mouth daily, Disp: , Rfl:      levothyroxine (SYNTHROID/LEVOTHROID) 100 MCG tablet, Take 100 mcg by mouth daily, Disp: , Rfl:      MAGNESIUM PO, , Disp: , Rfl:      Multiple Vitamins-Minerals (MULTIVITAMIN ADULT EXTRA C PO), Take 1 tablet by mouth every 24 hours, Disp: , Rfl:      Nutritional Supplements (SALMON OIL) CAPS, Take 2 capsules by mouth daily , Disp: , Rfl:      omeprazole (PRILOSEC) 20 MG DR capsule, Take 20 mg by mouth daily, Disp: , Rfl:      tamsulosin (FLOMAX) 0.4 MG capsule, Take 1 capsule (0.4 mg) by mouth daily, Disp: 30 capsule, Rfl: 3     cetirizine (ZYRTEC) 10 MG tablet, Take 10 mg by mouth as needed for allergies , Disp: , Rfl:      dexamethasone (DECADRON) 4 MG/ML injection, Apply 1 mL (4 mg) topically once for 1 dose For physical therapy, iontophoresis, Disp: 30 mL, Rfl: 0     diclofenac (VOLTAREN) 1 % topical gel, Place 2 g onto the skin 4 times daily (Patient not taking: Reported on 1/6/2021), Disp: 100 g, Rfl: 1  No current facility-administered medications for this visit.     Facility-Administered Medications Ordered in Other Visits:      0.9% sodium chloride BOLUS, 250 mL, Intravenous, Once, Uday Smalls MD     alteplase (CATHFLO ACTIVASE) injection 2 mg, 2 mg, Intracatheter, Once, Uday Smalls MD     leuprolide (3 Month) (ELIGARD) injection 22.5 mg, 22.5 mg, Subcutaneous, Q90 Days, Uday Smalls MD     zoledronic acid (ZOMETA) intermittent infusion 4 mg, 4 mg, Intravenous, Once, Nayla Murray PA-C    PHYSICAL EXAMINATION:  VITALS: /81 (BP Location: Right arm, Patient Position: Sitting, Cuff Size: Adult Regular)   Pulse 71   Temp 98.4  F (36.9  C) (Oral)   Resp 14   Wt 115.2 kg (254 lb)   SpO2 96%   BMI 34.45 kg/m     ECOG PS 0.  GENERAL: The patient is a pleasant male in no acute distress.  HEENT: EOMI. Sclerae are anicteric. Oral  mucosa is pink and moist.   Lymph: Neck is supple with no lymphadenopathy in the cervical or supraclavicular areas.   Heart: Regular rate and rhythm.   Lungs: Clear to auscultation bilaterally. Normal respiratory effort.  Abdomen: Bowel sounds present, soft, nontender with no palpable hepatosplenomegaly or masses.   Extremities: No lower extremity edema noted bilaterally. Muscle bulk decreased generally.  Neuro: Alert and fully oriented, no focal deficits.  Psych: Mood and affect normal.    LABORATORY DATA:   Lab Test 01/06/21  1213 10/07/20  1205 07/13/20  1500   WBC 6.2 5.9 5.5   RBC 4.49 4.89 4.88   HGB 13.8 14.4 14.2   HCT 40.2 43.5 44.2   MCV 90 89 91   MCH 30.7 29.4 29.1   MCHC 34.3 33.1 32.1   RDW 13.1 13.8 13.1    215 213   NEUTROPHIL 57.3 55.5 53.6    139 137   POTASSIUM 4.0 4.2 3.9   CHLORIDE 108 110* 105   CO2 26 25 24   ANIONGAP 5 4 8   GLC 98 91 92   BUN 21 21 16   CR 1.14 1.04 1.22   BETHANY 9.4 9.3 9.3   PROTTOTAL 7.2 7.6 7.7   ALBUMIN 4.0 4.0 4.0   BILITOTAL 0.5 0.4 0.4   ALKPHOS 46 35* 52   AST 21 15 18   ALT 36 24 25     Lab Test 01/06/21  1213 10/07/20  1205 07/13/20  1500 08/21/19  1436 07/31/19  1225    170 165 213 219     Lab Test 01/06/21  1213 10/07/20  1205 07/13/20  1500 04/08/20  0930 01/06/20  0858 10/02/19  0955 08/21/19  1436   PSA 0.29 0.29 0.42 0.41 0.44 0.58 0.71   CGAB  --   --  174* 852* 470* 736* 597*   TESTOSTTOTAL  --  12* 13* 8* 16* 16* <2*   CGAB - Chromogranin A; TESTOSTTOTAL: Total testosterone.    IMAGING:  As above.    ASSESSMENT & PLAN: Mr. Reyes is a delightful 60 year old gentleman with recently diagnosed metastatic castration sensitive prostate adenocarcinoma as well as an incidentally diagnosed stage III clear-cell renal cell carcinoma of the right kidney (s/p radical R nephrectomy 3/19/19), who is here for a follow-up visit after completion of docetaxel for prostate cancer.     1. Metastatic prostate cancer, with involvement of the bones and  "RPLN:   - Patient met the criteria for CHAARTED \"high-volume\" metastatic hormone-sensitive prostate cancer. He opted for docetaxel in combination with ADT (per ARTUROED, [id number], KARLA). Tolerated 6 cycles of docetaxel fairly well (5/8/19 through 8/21/19), with the exception of mild fatigue and mild neuropathy. Now on ADT and tolerating fairly well.  - Restaging CT CAP and NM Bone Scan from Jan 2021 shows continued partial response compared to pre-chemo baseline. The L5 sclerosis and increased rib uptake with stabilizing PSA could be early disease progression versus continued response.   - No clinical evidence of progression. While his PSA is not <0.2 at 7 months, which was shown to predict a better OS in a post-hoc analysis of CHAARTED data (Bri et al, JCO 2018) he does have an intact prostate gland and hence PSA is exceedingly unlikely to become undetectable even if his cancer is very well controlled.  - Continue leuprolide 22.5mg every 3 months - next dose today. Because of Lupron's national shortage, will continue Eligard 22.5mg today.   - He understands the risks and benefits of ADT including injection site reactions, hot flashes, mood changes and long-term cardiovascular, bone health, etc.  - Reviewed the role of cutting carbs, decreasing weight, doing resistance+aerobic exercises to reverse the skeletal side effects of ADT in detail.     2. Bone metastases:   - Noted to have osseous metastatic disease at the time of diagnosis, which has improved now following 6 cycles of docetaxel and continued ADT.  - Encouraged to continue calcium and vitamin D supplementation.  - Will continue Zometa 4mg IV every 3 months today. No contraindications (last dental procedure in early 2020) and pt advised on side effects including fever, bone pain, hypocalcemia, hypomagnesemia, ONJ, JESSICA, atypical fractures etc. Reassess for continued therapy in April 2022.     3. Stage 3, UISS-high risk ccRCC:  - Patient " understands that he has a stage III, UISS high risk clear-cell renal cell carcinoma with approximately 40-50% risk of recurrence after definitive surgery.   - At this time, he has no clear evidence of residual disease. The retroperitoneal lymphadenopathy is more consistent with metastatic prostate cancer and is improving/resolved.   - Continue active surveillance with self-reporting for signs/symptoms of recurrence (this was previously explained in detail) and periodic H&P and scans.      4. Genetics referral:  - Was referred and seen by Genetics on 3/7/19. No evidence of inherited cancer syndromes found on work-up.      5. Research participation:  - There were no available clinical trials for this gentleman at the time of diagnosis, especially given concurrent malignancy.   - Enrolled in the institutional biobanking study on 2/15/19.     Return to clinic in ~3 months with labs, scans and next doses of Eligard plus Zometa.    BILLIN.     Uday Smalls M.D.  . Professor of Medicine  Genitourinary Oncology  Division of Hematology, Oncology & Transplantation  [facility]

## 2021-01-08 LAB — TESTOST SERPL-MCNC: 8 NG/DL (ref 240–950)

## 2021-01-09 LAB — CGA SERPL-MCNC: 397 NG/ML (ref 0–103)

## 2021-01-15 ENCOUNTER — HEALTH MAINTENANCE LETTER (OUTPATIENT)
Age: 61
End: 2021-01-15

## 2021-03-29 ENCOUNTER — HOSPITAL ENCOUNTER (OUTPATIENT)
Dept: NUCLEAR MEDICINE | Facility: CLINIC | Age: 61
Setting detail: NUCLEAR MEDICINE
End: 2021-03-29
Attending: INTERNAL MEDICINE
Payer: COMMERCIAL

## 2021-03-29 ENCOUNTER — HOSPITAL ENCOUNTER (OUTPATIENT)
Dept: CT IMAGING | Facility: CLINIC | Age: 61
Discharge: HOME | End: 2021-03-29
Attending: INTERNAL MEDICINE | Admitting: INTERNAL MEDICINE
Payer: COMMERCIAL

## 2021-03-29 DIAGNOSIS — C61 MALIGNANT NEOPLASM OF PROSTATE METASTATIC TO BONE (H): ICD-10-CM

## 2021-03-29 DIAGNOSIS — C79.51 MALIGNANT NEOPLASM OF PROSTATE METASTATIC TO BONE (H): ICD-10-CM

## 2021-03-29 LAB
CREAT BLD-MCNC: 1 MG/DL (ref 0.66–1.25)
GFR SERPL CREATININE-BSD FRML MDRD: 76 ML/MIN/{1.73_M2}

## 2021-03-29 PROCEDURE — 71260 CT THORAX DX C+: CPT

## 2021-03-29 PROCEDURE — A9503 TC99M MEDRONATE: HCPCS | Performed by: INTERNAL MEDICINE

## 2021-03-29 PROCEDURE — 74177 CT ABD & PELVIS W/CONTRAST: CPT | Mod: 26 | Performed by: STUDENT IN AN ORGANIZED HEALTH CARE EDUCATION/TRAINING PROGRAM

## 2021-03-29 PROCEDURE — 78306 BONE IMAGING WHOLE BODY: CPT | Mod: 26 | Performed by: RADIOLOGY

## 2021-03-29 PROCEDURE — 71260 CT THORAX DX C+: CPT | Mod: 26 | Performed by: STUDENT IN AN ORGANIZED HEALTH CARE EDUCATION/TRAINING PROGRAM

## 2021-03-29 PROCEDURE — A9502 TC99M TETROFOSMIN: HCPCS | Performed by: INTERNAL MEDICINE

## 2021-03-29 PROCEDURE — 250N000011 HC RX IP 250 OP 636

## 2021-03-29 PROCEDURE — 82565 ASSAY OF CREATININE: CPT

## 2021-03-29 PROCEDURE — 78306 BONE IMAGING WHOLE BODY: CPT

## 2021-03-29 PROCEDURE — 343N000001 HC RX 343: Performed by: INTERNAL MEDICINE

## 2021-03-29 RX ORDER — TC 99M MEDRONATE 20 MG/10ML
25 INJECTION, POWDER, LYOPHILIZED, FOR SOLUTION INTRAVENOUS ONCE
Status: COMPLETED | OUTPATIENT
Start: 2021-03-29 | End: 2021-03-29

## 2021-03-29 RX ORDER — IOPAMIDOL 755 MG/ML
135 INJECTION, SOLUTION INTRAVASCULAR ONCE
Status: COMPLETED | OUTPATIENT
Start: 2021-03-29 | End: 2021-03-29

## 2021-03-29 RX ADMIN — IOPAMIDOL 135 ML: 755 INJECTION, SOLUTION INTRAVENOUS at 12:10

## 2021-03-29 RX ADMIN — TC 99M MEDRONATE 23.3 MCI.: 20 INJECTION, POWDER, LYOPHILIZED, FOR SOLUTION INTRAVENOUS at 12:01

## 2021-03-30 DIAGNOSIS — C79.51 MALIGNANT NEOPLASM OF PROSTATE METASTATIC TO BONE (H): Primary | ICD-10-CM

## 2021-03-30 DIAGNOSIS — C64.1 RENAL CELL CARCINOMA, RIGHT (H): ICD-10-CM

## 2021-03-30 DIAGNOSIS — C61 MALIGNANT NEOPLASM OF PROSTATE METASTATIC TO BONE (H): Primary | ICD-10-CM

## 2021-03-31 ENCOUNTER — APPOINTMENT (OUTPATIENT)
Dept: LAB | Facility: CLINIC | Age: 61
End: 2021-03-31
Attending: INTERNAL MEDICINE
Payer: COMMERCIAL

## 2021-03-31 ENCOUNTER — INFUSION THERAPY VISIT (OUTPATIENT)
Dept: ONCOLOGY | Facility: CLINIC | Age: 61
End: 2021-03-31
Attending: INTERNAL MEDICINE
Payer: COMMERCIAL

## 2021-03-31 VITALS
OXYGEN SATURATION: 97 % | WEIGHT: 253.9 LBS | DIASTOLIC BLOOD PRESSURE: 79 MMHG | SYSTOLIC BLOOD PRESSURE: 118 MMHG | TEMPERATURE: 97.9 F | BODY MASS INDEX: 34.44 KG/M2 | RESPIRATION RATE: 16 BRPM | HEART RATE: 82 BPM

## 2021-03-31 VITALS
WEIGHT: 253 LBS | BODY MASS INDEX: 34.31 KG/M2 | SYSTOLIC BLOOD PRESSURE: 118 MMHG | HEART RATE: 82 BPM | DIASTOLIC BLOOD PRESSURE: 79 MMHG | TEMPERATURE: 97.9 F | OXYGEN SATURATION: 97 %

## 2021-03-31 DIAGNOSIS — C61 MALIGNANT NEOPLASM OF PROSTATE METASTATIC TO BONE (H): Primary | ICD-10-CM

## 2021-03-31 DIAGNOSIS — C64.1 RENAL CELL CARCINOMA, RIGHT (H): ICD-10-CM

## 2021-03-31 DIAGNOSIS — C79.51 MALIGNANT NEOPLASM OF PROSTATE METASTATIC TO BONE (H): Primary | ICD-10-CM

## 2021-03-31 LAB
ALBUMIN SERPL-MCNC: 4 G/DL (ref 3.4–5)
ALP SERPL-CCNC: 57 U/L (ref 40–150)
ALT SERPL W P-5'-P-CCNC: 27 U/L (ref 0–70)
ANION GAP SERPL CALCULATED.3IONS-SCNC: 5 MMOL/L (ref 3–14)
AST SERPL W P-5'-P-CCNC: 14 U/L (ref 0–45)
BASOPHILS # BLD AUTO: 0 10E9/L (ref 0–0.2)
BASOPHILS NFR BLD AUTO: 0.6 %
BILIRUB SERPL-MCNC: 0.4 MG/DL (ref 0.2–1.3)
BUN SERPL-MCNC: 14 MG/DL (ref 7–30)
CALCIUM SERPL-MCNC: 9.2 MG/DL (ref 8.5–10.1)
CHLORIDE SERPL-SCNC: 110 MMOL/L (ref 94–109)
CO2 SERPL-SCNC: 26 MMOL/L (ref 20–32)
CREAT SERPL-MCNC: 1.07 MG/DL (ref 0.66–1.25)
DIFFERENTIAL METHOD BLD: NORMAL
EOSINOPHIL # BLD AUTO: 0.3 10E9/L (ref 0–0.7)
EOSINOPHIL NFR BLD AUTO: 5.1 %
ERYTHROCYTE [DISTWIDTH] IN BLOOD BY AUTOMATED COUNT: 12.8 % (ref 10–15)
GFR SERPL CREATININE-BSD FRML MDRD: 76 ML/MIN/{1.73_M2}
GLUCOSE SERPL-MCNC: 110 MG/DL (ref 70–99)
HCT VFR BLD AUTO: 41.3 % (ref 40–53)
HGB BLD-MCNC: 14 G/DL (ref 13.3–17.7)
IMM GRANULOCYTES # BLD: 0 10E9/L (ref 0–0.4)
IMM GRANULOCYTES NFR BLD: 0.2 %
LDH SERPL L TO P-CCNC: 153 U/L (ref 85–227)
LYMPHOCYTES # BLD AUTO: 1.7 10E9/L (ref 0.8–5.3)
LYMPHOCYTES NFR BLD AUTO: 35 %
MCH RBC QN AUTO: 30 PG (ref 26.5–33)
MCHC RBC AUTO-ENTMCNC: 33.9 G/DL (ref 31.5–36.5)
MCV RBC AUTO: 88 FL (ref 78–100)
MONOCYTES # BLD AUTO: 0.4 10E9/L (ref 0–1.3)
MONOCYTES NFR BLD AUTO: 8.6 %
NEUTROPHILS # BLD AUTO: 2.5 10E9/L (ref 1.6–8.3)
NEUTROPHILS NFR BLD AUTO: 50.5 %
NRBC # BLD AUTO: 0 10*3/UL
NRBC BLD AUTO-RTO: 0 /100
PLATELET # BLD AUTO: 193 10E9/L (ref 150–450)
POTASSIUM SERPL-SCNC: 4 MMOL/L (ref 3.4–5.3)
PROT SERPL-MCNC: 7.4 G/DL (ref 6.8–8.8)
PSA SERPL-MCNC: 0.44 UG/L (ref 0–4)
RBC # BLD AUTO: 4.67 10E12/L (ref 4.4–5.9)
SODIUM SERPL-SCNC: 140 MMOL/L (ref 133–144)
TSH SERPL DL<=0.005 MIU/L-ACNC: 1.63 MU/L (ref 0.4–4)
WBC # BLD AUTO: 4.9 10E9/L (ref 4–11)

## 2021-03-31 PROCEDURE — 99214 OFFICE O/P EST MOD 30 MIN: CPT | Performed by: INTERNAL MEDICINE

## 2021-03-31 PROCEDURE — 84153 ASSAY OF PSA TOTAL: CPT | Performed by: INTERNAL MEDICINE

## 2021-03-31 PROCEDURE — 250N000011 HC RX IP 250 OP 636: Performed by: INTERNAL MEDICINE

## 2021-03-31 PROCEDURE — 80053 COMPREHEN METABOLIC PANEL: CPT | Performed by: INTERNAL MEDICINE

## 2021-03-31 PROCEDURE — 83615 LACTATE (LD) (LDH) ENZYME: CPT | Performed by: INTERNAL MEDICINE

## 2021-03-31 PROCEDURE — 258N000003 HC RX IP 258 OP 636: Performed by: INTERNAL MEDICINE

## 2021-03-31 PROCEDURE — G0463 HOSPITAL OUTPT CLINIC VISIT: HCPCS

## 2021-03-31 PROCEDURE — 84403 ASSAY OF TOTAL TESTOSTERONE: CPT | Performed by: INTERNAL MEDICINE

## 2021-03-31 PROCEDURE — 250N000011 HC RX IP 250 OP 636: Performed by: PHYSICIAN ASSISTANT

## 2021-03-31 PROCEDURE — 86316 IMMUNOASSAY TUMOR OTHER: CPT | Performed by: INTERNAL MEDICINE

## 2021-03-31 PROCEDURE — 84443 ASSAY THYROID STIM HORMONE: CPT | Performed by: INTERNAL MEDICINE

## 2021-03-31 PROCEDURE — 96365 THER/PROPH/DIAG IV INF INIT: CPT

## 2021-03-31 PROCEDURE — 85025 COMPLETE CBC W/AUTO DIFF WBC: CPT | Performed by: INTERNAL MEDICINE

## 2021-03-31 PROCEDURE — 96402 CHEMO HORMON ANTINEOPL SQ/IM: CPT

## 2021-03-31 RX ORDER — ZOLEDRONIC ACID 0.04 MG/ML
4 INJECTION, SOLUTION INTRAVENOUS ONCE
Status: COMPLETED | OUTPATIENT
Start: 2021-03-31 | End: 2021-03-31

## 2021-03-31 RX ORDER — HEPARIN SODIUM (PORCINE) LOCK FLUSH IV SOLN 100 UNIT/ML 100 UNIT/ML
5 SOLUTION INTRAVENOUS
Status: CANCELLED | OUTPATIENT
Start: 2021-04-13

## 2021-03-31 RX ORDER — ZOLEDRONIC ACID 0.04 MG/ML
4 INJECTION, SOLUTION INTRAVENOUS ONCE
Status: CANCELLED | OUTPATIENT
Start: 2021-04-13 | End: 2021-04-13

## 2021-03-31 RX ORDER — HEPARIN SODIUM (PORCINE) LOCK FLUSH IV SOLN 100 UNIT/ML 100 UNIT/ML
5 SOLUTION INTRAVENOUS ONCE
Status: COMPLETED | OUTPATIENT
Start: 2021-03-31 | End: 2021-03-31

## 2021-03-31 RX ORDER — HEPARIN SODIUM,PORCINE 10 UNIT/ML
5 VIAL (ML) INTRAVENOUS
Status: CANCELLED | OUTPATIENT
Start: 2021-04-13

## 2021-03-31 RX ORDER — HEPARIN SODIUM (PORCINE) LOCK FLUSH IV SOLN 100 UNIT/ML 100 UNIT/ML
5 SOLUTION INTRAVENOUS
Status: DISCONTINUED | OUTPATIENT
Start: 2021-03-31 | End: 2021-03-31 | Stop reason: HOSPADM

## 2021-03-31 RX ADMIN — SODIUM CHLORIDE 250 ML: 9 INJECTION, SOLUTION INTRAVENOUS at 09:56

## 2021-03-31 RX ADMIN — LEUPROLIDE ACETATE 22.5 MG: KIT SUBCUTANEOUS at 10:21

## 2021-03-31 RX ADMIN — Medication 5 ML: at 10:25

## 2021-03-31 RX ADMIN — ZOLEDRONIC ACID 4 MG: 0.04 INJECTION, SOLUTION INTRAVENOUS at 09:57

## 2021-03-31 ASSESSMENT — PAIN SCALES - GENERAL
PAINLEVEL: NO PAIN (0)
PAINLEVEL: NO PAIN (0)

## 2021-03-31 NOTE — LETTER
"    3/31/2021         RE: Walter Reyes  [address] [address] [facility] [address]        Dear Colleague,    Thank you for referring your patient, Walter Reyes, to the [facility]. Please see a copy of my visit note below.    MEDICAL ONCOLOGY FOLLOW-UP NOTE    REFERRING PROVIDER: Jorge Alberto Yepez MD at [facility].    REASON FOR VISIT: Follow-up for metastatic hormone-sensitive prostate cancer, currently on ADT alone.    HISTORY OF PRESENT ILLNESS: Mr. 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 His oncologic history is detailed below.     Walter is doing well overall. He's gained weight over the past year but now stabilizing and working on diet+exercise. Hypersensitivity + numbness in bilateral plantar aspect of feet is becoming more prominent (grade 1-2 PSN). Energy levels are suboptimal and hot flashes are stable. Libido is non-existent.     No signs/symptoms of disease progression including hematuria etc.     ONCOLOGIC HISTORY:  1. 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 Naylor at [facility]. Prior PSA were 1.4 on 8/16/17, 2.4 on 6/28/16, 2.9 on 6/28/16, and as low as 0.4 on 3/26/2003.   - 1/08/2019: Consultation with Sarah Wilson CNP in Urology clinic. Repeat PSA 9.6.  - 1/16/2019: MRI prostate with contrast - \"This examination is characterized as PIRADS 5- very high probability. Clinically significant cancer is highly likely to be present. There is a large, invasive mass arising from the right peripheral zone and extending into the neurovascular bundle, seminal vesicle, and along the anterolateral right " "mesorectal fascia. Metastatic right external iliac lymph node. Metastatic lesion in the posterior/superior right acetabulum.\"  - 1/25/2019: CT abdomen and pelvis with IV contrast - \"1. Heterogeneous enhancing mass posteriorly in the upper pole of the right kidney measures 4.5 x 5.8 x 5.7 cm (AP by transverse by craniocaudal). It has a small nodular component extending posterior medially which abuts the right psoas muscle. This nodular extension measures 2.1 x 2.1 cm. Minimal stranding about the mass. No definite thrombus within the right renal vein. This renal mass is compatible with a renal cell carcinoma. A paraaortic lymph node situated immediately posterior to the left renal vein measures 1.1 cm in short axis, suspicious for metastasis. 2. A 2 cm right external iliac lymph node is also suspicious for metastases. 3. Multiple sclerotic osseous lesions suspicious for metastases. These include 0.8 and 0.6 cm sclerotic lesions laterally in the right iliac wing (images 53 and 62 respectively). Ill-defined groundglass density laterally in the right acetabulum measuring 1.7 x 1.2 cm corresponds with the lesion identified on MRI. A 0.4 cm groundglass density in the left acetabulum. Sclerotic lesion in the left femoral neck measures 0.9 x 1.6 cm (image 81). Sclerotic metastases would be more compatible with prostate metastases. 4. A 3 subcentimeter hepatic lesions are indeterminate. Metastases would be a consideration. These can be further characterized with liver MRI.\"  - 1/25/2019: NM bone scan - \"There is focal bony uptake in the left femoral neck, right acetabulum, right of midline at the S1 or L5 level of the spine, within multiple bilateral ribs, in the C7 or T1 level of the spine, and anteriorly within the skull. Findings are suspicious for metastases.\"  - 1/31/19: CT chest with contrast - \" No suspicious nodules in the chest.  Stable appearance of a 5.8 cm right renal mass concerning for renal carcinoma until " "proven otherwise.  Stable indeterminant subcentimeter hypodensities in the liver.  Multifocal osteoblastic metastasis including 2.5 cm lesion in the right fifth rib posteriorly and a 1.6 cm lesion in the right third rib posteriorly. No lytic lesions identified.\"  - 2/6/19: CT-guided right sclerotic fifth rib lesion biopsy - \"Metastatic carcinoma, consistent with prostate primary.  Immunohistochemical stains performed show the metastatic carcinoma stains positive for NKX3.1 (prostate marker), negative for STACIE 3 and PAX8, which supports the above diagnosis.\"  - 2/15/19: Started Casodex 50mg every day and consented for the biobanking protocol. 3/18/19 - stopped Casodex.  - 2/19/19: Case discussed in tumor board - recommendation for nephectomy for suspected malignant right renal mass.   - 2/26/19: Started Lupron 22.5mg every 3 months.   - 5/8/19: Started Docetaxel 75mg/m2 IV every 3 weeks. 06/18/19 - cycle 3, 7/10/19 - cycle 4, 07/31/19 - cycle 5, 08/21/19 - cycle 6.   - 10/2/19: Restaging CT CAP and NM Bone Scan showed improved osseous disease, no evidence of recurrent or new metastatic disease.  - 1/5/20: Restaging CT CAP and NM Bone Scan showed stable osseous disease, no evidence of recurrent or new metastatic disease.  - 4/6/20: NM bone scan with slightly improved uptake in known skeletal mets. CT C/A/P with contrast with stable osseous mets, no yusuf enlargement, no new visceral mets etc.  - 04/08/20: Zometa every 3 months.  - 10/5/20: CT C/A/P with contrast and NM bone scan - SD.   - 1/4/21: CT C/A/P with contrast and NM bone scan - SD but slight increase in right 5th rib tracer uptake and in posterior L5 sclerosis.   - 03/29/21: CT C/A/P with contrast - single new right sacral 8mm bone lesion (suspicious), other bone mets stable. No visceral/yusuf disease. Nephrectomy site without recurrence. NM bone scan - SD but \"increased uptake associated with the prior lesions of the lower  cervical spine, posterior right " "fourth rib and right L5 posterior arch elements. Otherwise unchanged uptake associated with the proximal left femur and left anterior fourth rib. Similar uptake of the left halux, possibly secondary to degenerative osteoarthritis or gout.\"     2. Stage III ([id number]), grade 3 of 4, clear-cell RCC of right kidney:  - Incidentally diagnosed as above.   - Underwent curative-intent robotic right radical nephrectomy with Dr. Wesley Cleveland on 3/19/19. No tumor spillage per op report.   - Path showed: \"Histologic Type: Clear cell renal cell carcinoma; Sarcomatoid Features: Not identified; Histologic Grade: Nucleolar grade 3 (WHO/ISUP). Extent Tumor Size: 4.3 cm. Microscopic Tumor Extension: Tumor extension into renal sinus (in vascular structures). Margins: Negative. Tumor Necrosis: Present; focal. Lymph-Vascular Invasion: Not identified.  Pathologic Staging (pTNM) Primary Tumor (pT): pT3a: Tumor extends into renal vein branches/renal sinus. Regional Lymph Nodes (pN): pNX. Number of Lymph Nodes Examined: 0 Distant Metastasis (pM): pM N/A.\"  - Restaging scans as above.    PAST MEDICAL HISTORY:  Past Medical History:   Diagnosis Date     Cancer of kidney, right (H)      Complication of anesthesia     slow wakeup      Malignant neoplasm of prostate metastatic to bone (H) 2/14/2019     Thyroid disease      PAST SURGICAL HISTORY:   Past Surgical History:   Procedure Laterality Date     CYSTOSCOPY      about 10 years ago     INSERT PORT VASCULAR ACCESS Right 5/2/2019    Procedure: Chest Port Placement;  Surgeon: Tate Burciaga PA-C;  Location: [facility] OR     IR CHEST PORT PLACEMENT > 5 YRS OF AGE  5/2/2019     LAPAROSCOPIC NEPHRECTOMY Right 3/19/2019    Procedure: Right laparoscopic radical nephrectomy;  Surgeon: Wesley Cleveland MD;  Location:  OR      SOCIAL HISTORY:   Social History     Tobacco Use     Smoking status: Never Smoker     Smokeless tobacco: Never Used   Substance Use Topics     Alcohol use: Not " on file     Comment: wine - very rare     Drug use: No     FAMILY HISTORY:   Family History   Problem Relation Age of Onset     Diabetes Father      ALLERGIES:   Allergies   Allergen Reactions     Doxycycline GI Disturbance     CURRENT MEDICATIONS:   Current Outpatient Medications:      amLODIPine (NORVASC) 10 MG tablet, Take 1 tablet (10 mg) by mouth daily, Disp: 30 tablet, Rfl: 0     atorvastatin (LIPITOR) 20 MG tablet, Take 20 mg by mouth daily, Disp: , Rfl:      calcium citrate-vitamin D (CITRACAL) 315-250 MG-UNIT TABS per tablet, Take 650 mg by mouth 2 times daily , Disp: , Rfl:      cetirizine (ZYRTEC) 10 MG tablet, Take 10 mg by mouth as needed for allergies , Disp: , Rfl:      co-enzyme Q-10 100 MG CAPS capsule, Take 100 mg by mouth daily , Disp: , Rfl:      cycloSPORINE (RESTASIS) 0.05 % ophthalmic emulsion, Place 1 drop into both eyes 2 times daily , Disp: , Rfl:      dexamethasone (DECADRON) 4 MG/ML injection, Apply 1 mL (4 mg) topically once for 1 dose For physical therapy, iontophoresis, Disp: 30 mL, Rfl: 0     diclofenac (VOLTAREN) 1 % topical gel, Place 2 g onto the skin 4 times daily (Patient not taking: Reported on 1/6/2021), Disp: 100 g, Rfl: 1     fluticasone (FLONASE) 50 MCG/ACT nasal spray, Spray 2 sprays in nostril daily as needed , Disp: , Rfl:      Glucosamine-Chondroit-Vit C-Mn (GLUCOSAMINE 1500 COMPLEX) CAPS, Take 1 capsule by mouth daily, Disp: , Rfl:      levothyroxine (SYNTHROID/LEVOTHROID) 100 MCG tablet, Take 100 mcg by mouth daily, Disp: , Rfl:      MAGNESIUM PO, , Disp: , Rfl:      Multiple Vitamins-Minerals (MULTIVITAMIN ADULT EXTRA C PO), Take 1 tablet by mouth every 24 hours, Disp: , Rfl:      Nutritional Supplements (SALMON OIL) CAPS, Take 2 capsules by mouth daily , Disp: , Rfl:      omeprazole (PRILOSEC) 20 MG DR capsule, Take 20 mg by mouth daily, Disp: , Rfl:      tamsulosin (FLOMAX) 0.4 MG capsule, Take 1 capsule (0.4 mg) by mouth daily, Disp: 30 capsule, Rfl: 3  No current  facility-administered medications for this visit.     Facility-Administered Medications Ordered in Other Visits:      heparin 100 UNIT/ML injection 5 mL, 5 mL, Intracatheter, Once, Uday Smalls MD     sodium chloride (PF) 0.9% PF flush 20 mL, 20 mL, Intracatheter, Once, Uday Smalls MD    PHYSICAL EXAMINATION:  VITALS: /79   Pulse 82   Temp 97.9  F (36.6  C) (Oral)   Wt 114.8 kg (253 lb)   SpO2 97%   BMI 34.31 kg/m     ECOG PS 1.  GENERAL: The patient is a pleasant male in no acute distress.  HEENT: EOMI. Sclerae are anicteric. Oral mucosa is pink and moist.   Lymph: Neck is supple with no lymphadenopathy in the cervical or supraclavicular areas.   Heart: Regular rate and rhythm.   Lungs: Clear to auscultation bilaterally. Normal respiratory effort.  Abdomen: Bowel sounds present, soft, nontender with no palpable hepatosplenomegaly or masses.   Extremities: No lower extremity edema noted bilaterally. Muscle bulk decreased generally.  Neuro: Alert and fully oriented, no focal deficits.  Psych: Mood and affect normal.    LABORATORY DATA:   Lab Test 03/31/21  0824 01/06/21  1213 10/07/20  1205   WBC 4.9 6.2 5.9   RBC 4.67 4.49 4.89   HGB 14.0 13.8 14.4   HCT 41.3 40.2 43.5   MCV 88 90 89   MCH 30.0 30.7 29.4   MCHC 33.9 34.3 33.1   RDW 12.8 13.1 13.8    219 215   NEUTROPHIL 50.5 57.3 55.5    139 139   POTASSIUM 4.0 4.0 4.2   CHLORIDE 110* 108 110*   CO2 26 26 25   ANIONGAP 5 5 4   * 98 91   BUN 14 21 21   CR 1.07 1.14 1.04   BETHANY 9.2 9.4 9.3   PROTTOTAL 7.4 7.2 7.6   ALBUMIN 4.0 4.0 4.0   BILITOTAL 0.4 0.5 0.4   ALKPHOS 57 46 35*   AST 14 21 15   ALT 27 36 24     Lab Test 03/31/21  0824 01/06/21  1213 10/07/20  1205 07/13/20  1500 08/21/19  1436    176 170 165 213     Lab Test 03/31/21  0824   TSH 1.63     Lab Test 03/31/21  0824 01/06/21  1213 10/07/20  1205 07/13/20  1500 04/08/20  0930 01/06/20  0858 10/02/19  0955 08/21/19  1436   PSA 0.44 0.29 0.29 0.42 0.41 0.44 0.58 0.71   CGAB  " --   --   --  174* 852* 470* 736* 597*   TESTOSTTOTAL  --  8* 12* 13* 8* 16* 16* <2*   CGAB - Chromogranin A; TESTOSTTOTAL: Total testosterone.    IMAGING:  As above.    ASSESSMENT & PLAN: Mr. Reyes is a delightful 61 year old gentleman with recently diagnosed metastatic castration sensitive prostate adenocarcinoma as well as an incidentally diagnosed stage III clear-cell renal cell carcinoma of the right kidney (s/p radical R nephrectomy 3/19/19), who is here for a follow-up visit after completion of docetaxel for prostate cancer.     1. Metastatic prostate cancer, with involvement of the bones and RPLN:   - Patient met the criteria for CHAARTED \"high-volume\" metastatic hormone-sensitive prostate cancer. He opted for docetaxel in combination with ADT (per JOSEFA, [id number], KARLA). Tolerated 6 cycles of docetaxel fairly well (5/8/19 through 8/21/19), with the exception of mild fatigue and mild neuropathy. Now on ADT and tolerating fairly well.  - Reviewed restaging CT CAP and NM Bone Scan from March 2021 - this in conjunction with scan findings from Jan 2021 as well as gradually uptrending PSA are concerning for disease progression. However, he doesn't meet any PCWG3 criteria for progression, is doing well clinically and with a relatively indolent disease trajectory on chemohormonal therapy.  - Discussed starting new Rx versus continued close monitoring on current regimen - patient opted for the latter.  - Continue leuprolide 22.5mg every 3 months - next dose today. Aware of the risks and benefits of ADT including injection site reactions, hot flashes, mood changes and long-term cardiovascular, bone health, etc.  - Reviewed the signs/symptoms of progression and advised to monitor at home.  - Repeat clinical eval and scans in ~3 months.    2. Bone metastases:   - Noted to have osseous metastatic disease at the time of diagnosis, which has improved now following 6 cycles of docetaxel and continued " ADT.  - Encouraged to continue calcium and vitamin D supplementation.  - Will continue Zometa 4mg IV every 3 months today. No contraindications (last dental procedure in early ) and pt advised on side effects including fever, bone pain, hypocalcemia, hypomagnesemia, ONJ, JESSICA, atypical fractures etc. Reassess for continued therapy in 2022.     3. Stage 3, UISS-high risk ccRCC:  - Patient understands that he has a stage III, UISS high risk clear-cell renal cell carcinoma with approximately 40-50% risk of recurrence after definitive surgery.   - At this time, he has no clear evidence of residual disease. The retroperitoneal lymphadenopathy is more consistent with metastatic prostate cancer and is improving/resolved.   - Continue active surveillance with self-reporting for signs/symptoms of recurrence (this was previously explained in detail) and periodic H&P and scans.      4. Genetics referral:  - Was referred and seen by Genetics on 3/7/19. No evidence of inherited cancer syndromes found on work-up.      5. Research participation:  - Could be an excellent candidate for CASPAR/JPSANTIAGO/ARACOG in the future.   - Enrolled in the institutional biobanking study on 2/15/19.     Return to clinic in ~3 months with labs, scans and next doses of leuprolide plus Zometa.    BILLIN.     Uday Smalls M.D.  . Professor of Medicine  Genitourinary Oncology  Division of Hematology, Oncology & Transplantation  [facility]        Again, thank you for allowing me to participate in the care of your patient.      Sincerely,    Uday Smalls MD

## 2021-03-31 NOTE — PROGRESS NOTES
Leuprolide (3 Month) (ELIGARD) injection 22.5 mg ordered by Dr. Smalls. Check MAR for documentation. Name and  verified. Eligard given in left side abdominal tissue. Patient tolerated injection and discharged to home.    Patient stated he would like Lupron Depot vs Eligard for his next injection.    -Heather KNUTSON, CMA

## 2021-03-31 NOTE — NURSING NOTE
Oncology Rooming Note    March 31, 2021 8:42 AM   Walter Reyes is a 61 year old male who presents for:    Chief Complaint   Patient presents with     Oncology Clinic Visit     metastatic prostate cancer     Initial Vitals: /79   Pulse 82   Temp 97.9  F (36.6  C) (Oral)   Wt 114.8 kg (253 lb)   SpO2 97%   BMI 34.31 kg/m   Estimated body mass index is 34.31 kg/m  as calculated from the following:    Height as of 8/4/20: 1.829 m (6').    Weight as of this encounter: 114.8 kg (253 lb). Body surface area is 2.41 meters squared.  No Pain (0) Comment: Data Unavailable   No LMP for male patient.  Allergies reviewed: Yes  Medications reviewed: Yes    Medications: Medication refills not needed today.  Pharmacy name entered into EPIC:    [facility] [place], [place] - [address] [id number] - [place], [place] - [address]    Clinical concerns: none       Holly Owens CMA

## 2021-03-31 NOTE — PROGRESS NOTES
Infusion Nursing Note:  Walter Reyes presents today for Zometa, Eliguard.    Patient seen by provider today: Yes: Slim   present during visit today: Not Applicable.    Note: Patient reports he is feeling well, he denies any further concerns following his office visit with MD.    Intravenous Access:  Implanted Port.    Treatment Conditions:  Lab Results   Component Value Date    HGB 14.0 03/31/2021     Lab Results   Component Value Date    WBC 4.9 03/31/2021      Lab Results   Component Value Date    ANEU 2.5 03/31/2021     Lab Results   Component Value Date     03/31/2021      Lab Results   Component Value Date     03/31/2021                   Lab Results   Component Value Date    POTASSIUM 4.0 03/31/2021       Lab Results   Component Value Date    CR 1.07 03/31/2021                   Lab Results   Component Value Date    BETHANY 9.2 03/31/2021                Lab Results   Component Value Date    BILITOTAL 0.4 03/31/2021           Lab Results   Component Value Date    ALBUMIN 4.0 03/31/2021                    Lab Results   Component Value Date    ALT 27 03/31/2021           Lab Results   Component Value Date    AST 14 03/31/2021       Results reviewed, labs MET treatment parameters, ok to proceed with treatment.      Post Infusion Assessment:  Patient tolerated infusion without incident.  Patient tolerated 1 Eliguard injection administered by CMA without incident. See CMA documentation for further detail.   Blood return noted pre and post infusion.  Site patent and intact, free from redness, edema or discomfort.  No evidence of extravasations.  Access discontinued per protocol.       Discharge Plan:   Patient declined prescription refills.  Discharge instructions reviewed with: Patient.  Patient and/or family verbalized understanding of discharge instructions and all questions answered.  AVS to patient via WiFastT.  Patient will return in 3 months. Appts to be scheduled from check out  orders today.    Patient discharged in stable condition accompanied by: self.  Departure Mode: Ambulatory.    Ivet Morales RN

## 2021-03-31 NOTE — PROGRESS NOTES
"MEDICAL ONCOLOGY FOLLOW-UP NOTE    REFERRING PROVIDER: Jorge Alberto Yepez MD at [facility].    REASON FOR VISIT: Follow-up for metastatic hormone-sensitive prostate cancer, currently on ADT alone.    HISTORY OF PRESENT ILLNESS: Mr. 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 His oncologic history is detailed below.     Walter is doing well overall. He's gained weight over the past year but now stabilizing and working on diet+exercise. Hypersensitivity + numbness in bilateral plantar aspect of feet is becoming more prominent (grade 1-2 PSN). Energy levels are suboptimal and hot flashes are stable. Libido is non-existent.     No signs/symptoms of disease progression including hematuria etc.     ONCOLOGIC HISTORY:  1. 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 Naylor at [facility]. Prior PSA were 1.4 on 8/16/17, 2.4 on 6/28/16, 2.9 on 6/28/16, and as low as 0.4 on 3/26/2003.   - 1/08/2019: Consultation with Sarah Wilson CNP in Urology clinic. Repeat PSA 9.6.  - 1/16/2019: MRI prostate with contrast - \"This examination is characterized as PIRADS 5- very high probability. Clinically significant cancer is highly likely to be present. There is a large, invasive mass arising from the right peripheral zone and extending into the neurovascular bundle, seminal vesicle, and along the anterolateral right mesorectal fascia. Metastatic right external iliac lymph node. Metastatic lesion in the posterior/superior right acetabulum.\"  - 1/25/2019: CT abdomen and pelvis with IV contrast - \"1. Heterogeneous enhancing mass posteriorly in the upper pole of the right kidney measures 4.5 x " "5.8 x 5.7 cm (AP by transverse by craniocaudal). It has a small nodular component extending posterior medially which abuts the right psoas muscle. This nodular extension measures 2.1 x 2.1 cm. Minimal stranding about the mass. No definite thrombus within the right renal vein. This renal mass is compatible with a renal cell carcinoma. A paraaortic lymph node situated immediately posterior to the left renal vein measures 1.1 cm in short axis, suspicious for metastasis. 2. A 2 cm right external iliac lymph node is also suspicious for metastases. 3. Multiple sclerotic osseous lesions suspicious for metastases. These include 0.8 and 0.6 cm sclerotic lesions laterally in the right iliac wing (images 53 and 62 respectively). Ill-defined groundglass density laterally in the right acetabulum measuring 1.7 x 1.2 cm corresponds with the lesion identified on MRI. A 0.4 cm groundglass density in the left acetabulum. Sclerotic lesion in the left femoral neck measures 0.9 x 1.6 cm (image 81). Sclerotic metastases would be more compatible with prostate metastases. 4. A 3 subcentimeter hepatic lesions are indeterminate. Metastases would be a consideration. These can be further characterized with liver MRI.\"  - 1/25/2019: NM bone scan - \"There is focal bony uptake in the left femoral neck, right acetabulum, right of midline at the S1 or L5 level of the spine, within multiple bilateral ribs, in the C7 or T1 level of the spine, and anteriorly within the skull. Findings are suspicious for metastases.\"  - 1/31/19: CT chest with contrast - \" No suspicious nodules in the chest.  Stable appearance of a 5.8 cm right renal mass concerning for renal carcinoma until proven otherwise.  Stable indeterminant subcentimeter hypodensities in the liver.  Multifocal osteoblastic metastasis including 2.5 cm lesion in the right fifth rib posteriorly and a 1.6 cm lesion in the right third rib posteriorly. No lytic lesions identified.\"  - 2/6/19: CT-guided " "right sclerotic fifth rib lesion biopsy - \"Metastatic carcinoma, consistent with prostate primary.  Immunohistochemical stains performed show the metastatic carcinoma stains positive for NKX3.1 (prostate marker), negative for STACIE 3 and PAX8, which supports the above diagnosis.\"  - 2/15/19: Started Casodex 50mg every day and consented for the biobanking protocol. 3/18/19 - stopped Casodex.  - 2/19/19: Case discussed in tumor board - recommendation for nephectomy for suspected malignant right renal mass.   - 2/26/19: Started Lupron 22.5mg every 3 months.   - 5/8/19: Started Docetaxel 75mg/m2 IV every 3 weeks. 06/18/19 - cycle 3, 7/10/19 - cycle 4, 07/31/19 - cycle 5, 08/21/19 - cycle 6.   - 10/2/19: Restaging CT CAP and NM Bone Scan showed improved osseous disease, no evidence of recurrent or new metastatic disease.  - 1/5/20: Restaging CT CAP and NM Bone Scan showed stable osseous disease, no evidence of recurrent or new metastatic disease.  - 4/6/20: NM bone scan with slightly improved uptake in known skeletal mets. CT C/A/P with contrast with stable osseous mets, no yusuf enlargement, no new visceral mets etc.  - 04/08/20: Zometa every 3 months.  - 10/5/20: CT C/A/P with contrast and NM bone scan - SD.   - 1/4/21: CT C/A/P with contrast and NM bone scan - SD but slight increase in right 5th rib tracer uptake and in posterior L5 sclerosis.   - 03/29/21: CT C/A/P with contrast - single new right sacral 8mm bone lesion (suspicious), other bone mets stable. No visceral/yusuf disease. Nephrectomy site without recurrence. NM bone scan - SD but \"increased uptake associated with the prior lesions of the lower  cervical spine, posterior right fourth rib and right L5 posterior arch elements. Otherwise unchanged uptake associated with the proximal left femur and left anterior fourth rib. Similar uptake of the left halux, possibly secondary to degenerative osteoarthritis or gout.\"     2. Stage III ([id number]), grade 3 of 4, " "clear-cell RCC of right kidney:  - Incidentally diagnosed as above.   - Underwent curative-intent robotic right radical nephrectomy with Dr. Wesley Cleveland on 3/19/19. No tumor spillage per op report.   - Path showed: \"Histologic Type: Clear cell renal cell carcinoma; Sarcomatoid Features: Not identified; Histologic Grade: Nucleolar grade 3 (WHO/ISUP). Extent Tumor Size: 4.3 cm. Microscopic Tumor Extension: Tumor extension into renal sinus (in vascular structures). Margins: Negative. Tumor Necrosis: Present; focal. Lymph-Vascular Invasion: Not identified.  Pathologic Staging (pTNM) Primary Tumor (pT): pT3a: Tumor extends into renal vein branches/renal sinus. Regional Lymph Nodes (pN): pNX. Number of Lymph Nodes Examined: 0 Distant Metastasis (pM): pM N/A.\"  - Restaging scans as above.    PAST MEDICAL HISTORY:  Past Medical History:   Diagnosis Date     Cancer of kidney, right (H)      Complication of anesthesia     slow wakeup      Malignant neoplasm of prostate metastatic to bone (H) 2/14/2019     Thyroid disease      PAST SURGICAL HISTORY:   Past Surgical History:   Procedure Laterality Date     CYSTOSCOPY      about 10 years ago     INSERT PORT VASCULAR ACCESS Right 5/2/2019    Procedure: Chest Port Placement;  Surgeon: Tate Burciaga PA-C;  Location: [facility] OR     IR CHEST PORT PLACEMENT > 5 YRS OF AGE  5/2/2019     LAPAROSCOPIC NEPHRECTOMY Right 3/19/2019    Procedure: Right laparoscopic radical nephrectomy;  Surgeon: Wesley Cleveland MD;  Location:  OR      SOCIAL HISTORY:   Social History     Tobacco Use     Smoking status: Never Smoker     Smokeless tobacco: Never Used   Substance Use Topics     Alcohol use: Not on file     Comment: wine - very rare     Drug use: No     FAMILY HISTORY:   Family History   Problem Relation Age of Onset     Diabetes Father      ALLERGIES:   Allergies   Allergen Reactions     Doxycycline GI Disturbance     CURRENT MEDICATIONS:   Current Outpatient Medications: "      amLODIPine (NORVASC) 10 MG tablet, Take 1 tablet (10 mg) by mouth daily, Disp: 30 tablet, Rfl: 0     atorvastatin (LIPITOR) 20 MG tablet, Take 20 mg by mouth daily, Disp: , Rfl:      calcium citrate-vitamin D (CITRACAL) 315-250 MG-UNIT TABS per tablet, Take 650 mg by mouth 2 times daily , Disp: , Rfl:      cetirizine (ZYRTEC) 10 MG tablet, Take 10 mg by mouth as needed for allergies , Disp: , Rfl:      co-enzyme Q-10 100 MG CAPS capsule, Take 100 mg by mouth daily , Disp: , Rfl:      cycloSPORINE (RESTASIS) 0.05 % ophthalmic emulsion, Place 1 drop into both eyes 2 times daily , Disp: , Rfl:      dexamethasone (DECADRON) 4 MG/ML injection, Apply 1 mL (4 mg) topically once for 1 dose For physical therapy, iontophoresis, Disp: 30 mL, Rfl: 0     diclofenac (VOLTAREN) 1 % topical gel, Place 2 g onto the skin 4 times daily (Patient not taking: Reported on 1/6/2021), Disp: 100 g, Rfl: 1     fluticasone (FLONASE) 50 MCG/ACT nasal spray, Spray 2 sprays in nostril daily as needed , Disp: , Rfl:      Glucosamine-Chondroit-Vit C-Mn (GLUCOSAMINE 1500 COMPLEX) CAPS, Take 1 capsule by mouth daily, Disp: , Rfl:      levothyroxine (SYNTHROID/LEVOTHROID) 100 MCG tablet, Take 100 mcg by mouth daily, Disp: , Rfl:      MAGNESIUM PO, , Disp: , Rfl:      Multiple Vitamins-Minerals (MULTIVITAMIN ADULT EXTRA C PO), Take 1 tablet by mouth every 24 hours, Disp: , Rfl:      Nutritional Supplements (SALMON OIL) CAPS, Take 2 capsules by mouth daily , Disp: , Rfl:      omeprazole (PRILOSEC) 20 MG DR capsule, Take 20 mg by mouth daily, Disp: , Rfl:      tamsulosin (FLOMAX) 0.4 MG capsule, Take 1 capsule (0.4 mg) by mouth daily, Disp: 30 capsule, Rfl: 3  No current facility-administered medications for this visit.     Facility-Administered Medications Ordered in Other Visits:      heparin 100 UNIT/ML injection 5 mL, 5 mL, Intracatheter, Once, Uday Smalls MD     sodium chloride (PF) 0.9% PF flush 20 mL, 20 mL, Intracatheter, Once, Smalls, Uday,  MD    PHYSICAL EXAMINATION:  VITALS: /79   Pulse 82   Temp 97.9  F (36.6  C) (Oral)   Wt 114.8 kg (253 lb)   SpO2 97%   BMI 34.31 kg/m     ECOG PS 1.  GENERAL: The patient is a pleasant male in no acute distress.  HEENT: EOMI. Sclerae are anicteric. Oral mucosa is pink and moist.   Lymph: Neck is supple with no lymphadenopathy in the cervical or supraclavicular areas.   Heart: Regular rate and rhythm.   Lungs: Clear to auscultation bilaterally. Normal respiratory effort.  Abdomen: Bowel sounds present, soft, nontender with no palpable hepatosplenomegaly or masses.   Extremities: No lower extremity edema noted bilaterally. Muscle bulk decreased generally.  Neuro: Alert and fully oriented, no focal deficits.  Psych: Mood and affect normal.    LABORATORY DATA:   Lab Test 03/31/21  0824 01/06/21  1213 10/07/20  1205   WBC 4.9 6.2 5.9   RBC 4.67 4.49 4.89   HGB 14.0 13.8 14.4   HCT 41.3 40.2 43.5   MCV 88 90 89   MCH 30.0 30.7 29.4   MCHC 33.9 34.3 33.1   RDW 12.8 13.1 13.8    219 215   NEUTROPHIL 50.5 57.3 55.5    139 139   POTASSIUM 4.0 4.0 4.2   CHLORIDE 110* 108 110*   CO2 26 26 25   ANIONGAP 5 5 4   * 98 91   BUN 14 21 21   CR 1.07 1.14 1.04   BETHANY 9.2 9.4 9.3   PROTTOTAL 7.4 7.2 7.6   ALBUMIN 4.0 4.0 4.0   BILITOTAL 0.4 0.5 0.4   ALKPHOS 57 46 35*   AST 14 21 15   ALT 27 36 24     Lab Test 03/31/21  0824 01/06/21  1213 10/07/20  1205 07/13/20  1500 08/21/19  1436    176 170 165 213     Lab Test 03/31/21  0824   TSH 1.63     Lab Test 03/31/21  0824 01/06/21  1213 10/07/20  1205 07/13/20  1500 04/08/20  0930 01/06/20  0858 10/02/19  0955 08/21/19  1436   PSA 0.44 0.29 0.29 0.42 0.41 0.44 0.58 0.71   CGAB  --   --   --  174* 852* 470* 736* 597*   TESTOSTTOTAL  --  8* 12* 13* 8* 16* 16* <2*   CGAB - Chromogranin A; TESTOSTTOTAL: Total testosterone.    IMAGING:  As above.    ASSESSMENT & PLAN: Mr. Reyes is a delightful 61 year old gentleman with recently diagnosed metastatic  "castration sensitive prostate adenocarcinoma as well as an incidentally diagnosed stage III clear-cell renal cell carcinoma of the right kidney (s/p radical R nephrectomy 3/19/19), who is here for a follow-up visit after completion of docetaxel for prostate cancer.     1. Metastatic prostate cancer, with involvement of the bones and RPLN:   - Patient met the criteria for CHAARTED \"high-volume\" metastatic hormone-sensitive prostate cancer. He opted for docetaxel in combination with ADT (per JOSEFA, [id number], KARLA). Tolerated 6 cycles of docetaxel fairly well (5/8/19 through 8/21/19), with the exception of mild fatigue and mild neuropathy. Now on ADT and tolerating fairly well.  - Reviewed restaging CT CAP and NM Bone Scan from March 2021 - this in conjunction with scan findings from Jan 2021 as well as gradually uptrending PSA are concerning for disease progression. However, he doesn't meet any PCWG3 criteria for progression, is doing well clinically and with a relatively indolent disease trajectory on chemohormonal therapy.  - Discussed starting new Rx versus continued close monitoring on current regimen - patient opted for the latter.  - Continue leuprolide 22.5mg every 3 months - next dose today. Aware of the risks and benefits of ADT including injection site reactions, hot flashes, mood changes and long-term cardiovascular, bone health, etc.  - Reviewed the signs/symptoms of progression and advised to monitor at home.  - Repeat clinical eval and scans in ~3 months.    2. Bone metastases:   - Noted to have osseous metastatic disease at the time of diagnosis, which has improved now following 6 cycles of docetaxel and continued ADT.  - Encouraged to continue calcium and vitamin D supplementation.  - Will continue Zometa 4mg IV every 3 months today. No contraindications (last dental procedure in early 2020) and pt advised on side effects including fever, bone pain, hypocalcemia, hypomagnesemia, ONJ, JESSICA, " atypical fractures etc. Reassess for continued therapy in 2022.     3. Stage 3, UISS-high risk ccRCC:  - Patient understands that he has a stage III, UISS high risk clear-cell renal cell carcinoma with approximately 40-50% risk of recurrence after definitive surgery.   - At this time, he has no clear evidence of residual disease. The retroperitoneal lymphadenopathy is more consistent with metastatic prostate cancer and is improving/resolved.   - Continue active surveillance with self-reporting for signs/symptoms of recurrence (this was previously explained in detail) and periodic H&P and scans.      4. Genetics referral:  - Was referred and seen by Genetics on 3/7/19. No evidence of inherited cancer syndromes found on work-up.      5. Research participation:  - Could be an excellent candidate for CASPAR/JPSANTIAGO/ARACOG in the future.   - Enrolled in the institutional biobanking study on 2/15/19.     Return to clinic in ~3 months with labs, scans and next doses of leuprolide plus Zometa.    BILLIN.     Uday Smalls M.D.  . Professor of Medicine  Genitourinary Oncology  Division of Hematology, Oncology & Transplantation  [facility]

## 2021-03-31 NOTE — NURSING NOTE
Chief Complaint   Patient presents with     Port Draw     Labs drawn via port by RN in lab. VS taken.      Port accessed with 20g gripper needle by RN, labs collected, line flushed with saline and heparin.  Vitals taken. Pt checked in for appointment(s).    Gloria SEAY RN PHN BSN  BMT/Oncology Lab

## 2021-04-01 LAB — TESTOST SERPL-MCNC: 11 NG/DL (ref 240–950)

## 2021-04-02 LAB — CGA SERPL-MCNC: 406 NG/ML (ref 0–103)

## 2021-06-30 ENCOUNTER — HOSPITAL ENCOUNTER (OUTPATIENT)
Dept: NUCLEAR MEDICINE | Facility: CLINIC | Age: 61
Setting detail: NUCLEAR MEDICINE
End: 2021-06-30
Attending: INTERNAL MEDICINE
Payer: COMMERCIAL

## 2021-06-30 ENCOUNTER — HOSPITAL ENCOUNTER (OUTPATIENT)
Dept: CT IMAGING | Facility: CLINIC | Age: 61
Discharge: HOME | End: 2021-06-30
Attending: INTERNAL MEDICINE | Admitting: INTERNAL MEDICINE
Payer: COMMERCIAL

## 2021-06-30 DIAGNOSIS — C61 MALIGNANT NEOPLASM OF PROSTATE METASTATIC TO BONE (H): ICD-10-CM

## 2021-06-30 DIAGNOSIS — C79.51 MALIGNANT NEOPLASM OF PROSTATE METASTATIC TO BONE (H): ICD-10-CM

## 2021-06-30 DIAGNOSIS — C64.1 RENAL CELL CARCINOMA, RIGHT (H): ICD-10-CM

## 2021-06-30 PROCEDURE — 250N000011 HC RX IP 250 OP 636: Performed by: RADIOLOGY

## 2021-06-30 PROCEDURE — 78306 BONE IMAGING WHOLE BODY: CPT | Mod: 26

## 2021-06-30 PROCEDURE — A9503 TC99M MEDRONATE: HCPCS | Performed by: INTERNAL MEDICINE

## 2021-06-30 PROCEDURE — 74177 CT ABD & PELVIS W/CONTRAST: CPT | Mod: 26 | Performed by: RADIOLOGY

## 2021-06-30 PROCEDURE — 71260 CT THORAX DX C+: CPT

## 2021-06-30 PROCEDURE — 343N000001 HC RX 343: Performed by: INTERNAL MEDICINE

## 2021-06-30 PROCEDURE — 71260 CT THORAX DX C+: CPT | Mod: 26 | Performed by: RADIOLOGY

## 2021-06-30 PROCEDURE — 78306 BONE IMAGING WHOLE BODY: CPT

## 2021-06-30 RX ORDER — IOPAMIDOL 755 MG/ML
135 INJECTION, SOLUTION INTRAVASCULAR ONCE
Status: COMPLETED | OUTPATIENT
Start: 2021-06-30 | End: 2021-06-30

## 2021-06-30 RX ORDER — HEPARIN SODIUM (PORCINE) LOCK FLUSH IV SOLN 100 UNIT/ML 100 UNIT/ML
5 SOLUTION INTRAVENOUS ONCE
Status: COMPLETED | OUTPATIENT
Start: 2021-06-30 | End: 2021-06-30

## 2021-06-30 RX ORDER — TC 99M MEDRONATE 20 MG/10ML
20-30 INJECTION, POWDER, LYOPHILIZED, FOR SOLUTION INTRAVENOUS ONCE
Status: COMPLETED | OUTPATIENT
Start: 2021-06-30 | End: 2021-06-30

## 2021-06-30 RX ADMIN — TC 99M MEDRONATE 26.9 MCI.: 20 INJECTION, POWDER, LYOPHILIZED, FOR SOLUTION INTRAVENOUS at 09:17

## 2021-06-30 RX ADMIN — Medication 5 ML: at 09:31

## 2021-06-30 RX ADMIN — IOPAMIDOL 135 ML: 755 INJECTION, SOLUTION INTRAVENOUS at 09:18

## 2021-07-07 ENCOUNTER — APPOINTMENT (OUTPATIENT)
Dept: LAB | Facility: CLINIC | Age: 61
End: 2021-07-07
Attending: PHYSICIAN ASSISTANT
Payer: COMMERCIAL

## 2021-07-07 ENCOUNTER — INFUSION THERAPY VISIT (OUTPATIENT)
Dept: ONCOLOGY | Facility: CLINIC | Age: 61
End: 2021-07-07
Attending: PHYSICIAN ASSISTANT
Payer: COMMERCIAL

## 2021-07-07 VITALS
WEIGHT: 245 LBS | BODY MASS INDEX: 33.23 KG/M2 | RESPIRATION RATE: 16 BRPM | HEART RATE: 63 BPM | TEMPERATURE: 98.1 F | OXYGEN SATURATION: 98 % | SYSTOLIC BLOOD PRESSURE: 125 MMHG | DIASTOLIC BLOOD PRESSURE: 83 MMHG

## 2021-07-07 DIAGNOSIS — C79.51 MALIGNANT NEOPLASM OF PROSTATE METASTATIC TO BONE (H): ICD-10-CM

## 2021-07-07 DIAGNOSIS — C79.51 MALIGNANT NEOPLASM OF PROSTATE METASTATIC TO BONE (H): Primary | ICD-10-CM

## 2021-07-07 DIAGNOSIS — C61 MALIGNANT NEOPLASM OF PROSTATE METASTATIC TO BONE (H): ICD-10-CM

## 2021-07-07 DIAGNOSIS — C61 MALIGNANT NEOPLASM OF PROSTATE METASTATIC TO BONE (H): Primary | ICD-10-CM

## 2021-07-07 DIAGNOSIS — C64.1 RENAL CELL CARCINOMA, RIGHT (H): Primary | ICD-10-CM

## 2021-07-07 LAB
ALBUMIN SERPL-MCNC: 3.9 G/DL (ref 3.4–5)
ALP SERPL-CCNC: 70 U/L (ref 40–150)
ALT SERPL W P-5'-P-CCNC: 25 U/L (ref 0–70)
ANION GAP SERPL CALCULATED.3IONS-SCNC: 7 MMOL/L (ref 3–14)
AST SERPL W P-5'-P-CCNC: 17 U/L (ref 0–45)
BASOPHILS # BLD AUTO: 0 10E9/L (ref 0–0.2)
BASOPHILS NFR BLD AUTO: 0.3 %
BILIRUB SERPL-MCNC: 0.7 MG/DL (ref 0.2–1.3)
BUN SERPL-MCNC: 15 MG/DL (ref 7–30)
CALCIUM SERPL-MCNC: 8.9 MG/DL (ref 8.5–10.1)
CHLORIDE SERPL-SCNC: 107 MMOL/L (ref 94–109)
CO2 SERPL-SCNC: 25 MMOL/L (ref 20–32)
CREAT SERPL-MCNC: 0.99 MG/DL (ref 0.66–1.25)
DIFFERENTIAL METHOD BLD: ABNORMAL
EOSINOPHIL # BLD AUTO: 0.3 10E9/L (ref 0–0.7)
EOSINOPHIL NFR BLD AUTO: 4.2 %
ERYTHROCYTE [DISTWIDTH] IN BLOOD BY AUTOMATED COUNT: 13.1 % (ref 10–15)
GFR SERPL CREATININE-BSD FRML MDRD: 82 ML/MIN/{1.73_M2}
GLUCOSE SERPL-MCNC: 105 MG/DL (ref 70–99)
HCT VFR BLD AUTO: 39.7 % (ref 40–53)
HGB BLD-MCNC: 13.6 G/DL (ref 13.3–17.7)
IMM GRANULOCYTES # BLD: 0 10E9/L (ref 0–0.4)
IMM GRANULOCYTES NFR BLD: 0.3 %
LDH SERPL L TO P-CCNC: 155 U/L (ref 85–227)
LYMPHOCYTES # BLD AUTO: 1.6 10E9/L (ref 0.8–5.3)
LYMPHOCYTES NFR BLD AUTO: 21.6 %
MCH RBC QN AUTO: 30.6 PG (ref 26.5–33)
MCHC RBC AUTO-ENTMCNC: 34.3 G/DL (ref 31.5–36.5)
MCV RBC AUTO: 89 FL (ref 78–100)
MONOCYTES # BLD AUTO: 0.6 10E9/L (ref 0–1.3)
MONOCYTES NFR BLD AUTO: 8 %
NEUTROPHILS # BLD AUTO: 5 10E9/L (ref 1.6–8.3)
NEUTROPHILS NFR BLD AUTO: 65.6 %
NRBC # BLD AUTO: 0 10*3/UL
NRBC BLD AUTO-RTO: 0 /100
PLATELET # BLD AUTO: 196 10E9/L (ref 150–450)
POTASSIUM SERPL-SCNC: 4.1 MMOL/L (ref 3.4–5.3)
PROT SERPL-MCNC: 7.4 G/DL (ref 6.8–8.8)
PSA SERPL-MCNC: 0.47 UG/L (ref 0–4)
RBC # BLD AUTO: 4.44 10E12/L (ref 4.4–5.9)
SODIUM SERPL-SCNC: 139 MMOL/L (ref 133–144)
TSH SERPL DL<=0.005 MIU/L-ACNC: 1.67 MU/L (ref 0.4–4)
WBC # BLD AUTO: 7.6 10E9/L (ref 4–11)

## 2021-07-07 PROCEDURE — 250N000011 HC RX IP 250 OP 636: Performed by: PHYSICIAN ASSISTANT

## 2021-07-07 PROCEDURE — 84403 ASSAY OF TOTAL TESTOSTERONE: CPT | Performed by: INTERNAL MEDICINE

## 2021-07-07 PROCEDURE — 96365 THER/PROPH/DIAG IV INF INIT: CPT

## 2021-07-07 PROCEDURE — 80053 COMPREHEN METABOLIC PANEL: CPT | Performed by: INTERNAL MEDICINE

## 2021-07-07 PROCEDURE — 84443 ASSAY THYROID STIM HORMONE: CPT | Performed by: INTERNAL MEDICINE

## 2021-07-07 PROCEDURE — 250N000011 HC RX IP 250 OP 636: Performed by: INTERNAL MEDICINE

## 2021-07-07 PROCEDURE — 83615 LACTATE (LD) (LDH) ENZYME: CPT | Performed by: INTERNAL MEDICINE

## 2021-07-07 PROCEDURE — 258N000003 HC RX IP 258 OP 636: Performed by: INTERNAL MEDICINE

## 2021-07-07 PROCEDURE — 96402 CHEMO HORMON ANTINEOPL SQ/IM: CPT

## 2021-07-07 PROCEDURE — 84153 ASSAY OF PSA TOTAL: CPT | Performed by: INTERNAL MEDICINE

## 2021-07-07 PROCEDURE — 85025 COMPLETE CBC W/AUTO DIFF WBC: CPT | Performed by: INTERNAL MEDICINE

## 2021-07-07 PROCEDURE — 99215 OFFICE O/P EST HI 40 MIN: CPT | Performed by: PHYSICIAN ASSISTANT

## 2021-07-07 RX ORDER — HEPARIN SODIUM,PORCINE 10 UNIT/ML
5 VIAL (ML) INTRAVENOUS
Status: CANCELLED | OUTPATIENT
Start: 2021-07-12

## 2021-07-07 RX ORDER — HEPARIN SODIUM (PORCINE) LOCK FLUSH IV SOLN 100 UNIT/ML 100 UNIT/ML
5 SOLUTION INTRAVENOUS EVERY 8 HOURS PRN
Status: DISCONTINUED | OUTPATIENT
Start: 2021-07-07 | End: 2021-07-12 | Stop reason: HOSPADM

## 2021-07-07 RX ORDER — HEPARIN SODIUM (PORCINE) LOCK FLUSH IV SOLN 100 UNIT/ML 100 UNIT/ML
5 SOLUTION INTRAVENOUS
Status: CANCELLED | OUTPATIENT
Start: 2021-07-12

## 2021-07-07 RX ORDER — DOXYCYCLINE HYCLATE 100 MG
100 TABLET ORAL DAILY
COMMUNITY
End: 2022-08-02

## 2021-07-07 RX ORDER — ZOLEDRONIC ACID 0.04 MG/ML
4 INJECTION, SOLUTION INTRAVENOUS ONCE
Status: COMPLETED | OUTPATIENT
Start: 2021-07-07 | End: 2021-07-07

## 2021-07-07 RX ORDER — HEPARIN SODIUM (PORCINE) LOCK FLUSH IV SOLN 100 UNIT/ML 100 UNIT/ML
5 SOLUTION INTRAVENOUS
Status: DISCONTINUED | OUTPATIENT
Start: 2021-07-07 | End: 2021-07-07 | Stop reason: HOSPADM

## 2021-07-07 RX ORDER — ZOLEDRONIC ACID 0.04 MG/ML
4 INJECTION, SOLUTION INTRAVENOUS ONCE
Status: CANCELLED | OUTPATIENT
Start: 2021-07-12 | End: 2021-07-12

## 2021-07-07 RX ADMIN — LEUPROLIDE ACETATE 22.5 MG: KIT at 11:20

## 2021-07-07 RX ADMIN — Medication 5 ML: at 11:09

## 2021-07-07 RX ADMIN — SODIUM CHLORIDE 250 ML: 9 INJECTION, SOLUTION INTRAVENOUS at 10:33

## 2021-07-07 RX ADMIN — Medication 5 ML: at 08:28

## 2021-07-07 RX ADMIN — ZOLEDRONIC ACID 4 MG: 0.04 INJECTION, SOLUTION INTRAVENOUS at 10:33

## 2021-07-07 ASSESSMENT — PAIN SCALES - GENERAL: PAINLEVEL: NO PAIN (0)

## 2021-07-07 NOTE — PROGRESS NOTES
"MEDICAL ONCOLOGY FOLLOW-UP NOTE  Jul 7, 2021    REASON FOR VISIT: Follow-up for metastatic hormone-sensitive prostate cancer, currently on ADT alone.    HISTORY OF PRESENT ILLNESS: Mr. 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 His oncologic history is detailed below.     -worsening urinary symptoms at night  -also notes that his stamina is lower  -neuropathy in feet is overall stable     ONCOLOGIC HISTORY:  1. 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 Naylor at [facility]. Prior PSA were 1.4 on 8/16/17, 2.4 on 6/28/16, 2.9 on 6/28/16, and as low as 0.4 on 3/26/2003.   - 1/08/2019: Consultation with Sarah Wilson CNP in Urology clinic. Repeat PSA 9.6.  - 1/16/2019: MRI prostate with contrast - \"This examination is characterized as PIRADS 5- very high probability. Clinically significant cancer is highly likely to be present. There is a large, invasive mass arising from the right peripheral zone and extending into the neurovascular bundle, seminal vesicle, and along the anterolateral right mesorectal fascia. Metastatic right external iliac lymph node. Metastatic lesion in the posterior/superior right acetabulum.\"  - 1/25/2019: CT abdomen and pelvis with IV contrast - \"1. Heterogeneous enhancing mass posteriorly in the upper pole of the right kidney measures 4.5 x 5.8 x 5.7 cm (AP by transverse by craniocaudal). It has a small nodular component extending posterior medially which abuts the right psoas muscle. This nodular extension measures 2.1 x 2.1 cm. Minimal stranding about the mass. No definite thrombus within the right renal vein. This renal mass is compatible with a renal cell " "carcinoma. A paraaortic lymph node situated immediately posterior to the left renal vein measures 1.1 cm in short axis, suspicious for metastasis. 2. A 2 cm right external iliac lymph node is also suspicious for metastases. 3. Multiple sclerotic osseous lesions suspicious for metastases. These include 0.8 and 0.6 cm sclerotic lesions laterally in the right iliac wing (images 53 and 62 respectively). Ill-defined groundglass density laterally in the right acetabulum measuring 1.7 x 1.2 cm corresponds with the lesion identified on MRI. A 0.4 cm groundglass density in the left acetabulum. Sclerotic lesion in the left femoral neck measures 0.9 x 1.6 cm (image 81). Sclerotic metastases would be more compatible with prostate metastases. 4. A 3 subcentimeter hepatic lesions are indeterminate. Metastases would be a consideration. These can be further characterized with liver MRI.\"  - 1/25/2019: NM bone scan - \"There is focal bony uptake in the left femoral neck, right acetabulum, right of midline at the S1 or L5 level of the spine, within multiple bilateral ribs, in the C7 or T1 level of the spine, and anteriorly within the skull. Findings are suspicious for metastases.\"  - 1/31/19: CT chest with contrast - \" No suspicious nodules in the chest.  Stable appearance of a 5.8 cm right renal mass concerning for renal carcinoma until proven otherwise.  Stable indeterminant subcentimeter hypodensities in the liver.  Multifocal osteoblastic metastasis including 2.5 cm lesion in the right fifth rib posteriorly and a 1.6 cm lesion in the right third rib posteriorly. No lytic lesions identified.\"  - 2/6/19: CT-guided right sclerotic fifth rib lesion biopsy - \"Metastatic carcinoma, consistent with prostate primary.  Immunohistochemical stains performed show the metastatic carcinoma stains positive for NKX3.1 (prostate marker), negative for STACIE 3 and PAX8, which supports the above diagnosis.\"  - 2/15/19: Started Casodex 50mg every day " "and consented for the biobanking protocol. 3/18/19 - stopped Casodex.  - 2/19/19: Case discussed in tumor board - recommendation for nephectomy for suspected malignant right renal mass.   - 2/26/19: Started Lupron 22.5mg every 3 months.   - 5/8/19: Started Docetaxel 75mg/m2 IV every 3 weeks. 06/18/19 - cycle 3, 7/10/19 - cycle 4, 07/31/19 - cycle 5, 08/21/19 - cycle 6.   - 10/2/19: Restaging CT CAP and NM Bone Scan showed improved osseous disease, no evidence of recurrent or new metastatic disease.  - 1/5/20: Restaging CT CAP and NM Bone Scan showed stable osseous disease, no evidence of recurrent or new metastatic disease.  - 4/6/20: NM bone scan with slightly improved uptake in known skeletal mets. CT C/A/P with contrast with stable osseous mets, no yusuf enlargement, no new visceral mets etc.  - 04/08/20: Zometa every 3 months.  - 10/5/20: CT C/A/P with contrast and NM bone scan - SD.   - 1/4/21: CT C/A/P with contrast and NM bone scan - SD but slight increase in right 5th rib tracer uptake and in posterior L5 sclerosis.   - 03/29/21: CT C/A/P with contrast - single new right sacral 8mm bone lesion (suspicious), other bone mets stable. No visceral/yusuf disease. Nephrectomy site without recurrence. NM bone scan - SD but \"increased uptake associated with the prior lesions of the lower  cervical spine, posterior right fourth rib and right L5 posterior arch elements. Otherwise unchanged uptake associated with the proximal left femur and left anterior fourth rib. Similar uptake of the left halux, possibly secondary to degenerative osteoarthritis or gout.\"     2. Stage III ([id number]), grade 3 of 4, clear-cell RCC of right kidney:  - Incidentally diagnosed as above.   - Underwent curative-intent robotic right radical nephrectomy with Dr. Wesley Cleveland on 3/19/19. No tumor spillage per op report.   - Path showed: \"Histologic Type: Clear cell renal cell carcinoma; Sarcomatoid Features: Not identified; Histologic Grade: " "Nucleolar grade 3 (WHO/ISUP). Extent Tumor Size: 4.3 cm. Microscopic Tumor Extension: Tumor extension into renal sinus (in vascular structures). Margins: Negative. Tumor Necrosis: Present; focal. Lymph-Vascular Invasion: Not identified.  Pathologic Staging (pTNM) Primary Tumor (pT): pT3a: Tumor extends into renal vein branches/renal sinus. Regional Lymph Nodes (pN): pNX. Number of Lymph Nodes Examined: 0 Distant Metastasis (pM): pM N/A.\"  - Restaging scans as above.    PAST MEDICAL HISTORY:  Past Medical History:   Diagnosis Date     Cancer of kidney, right (H)      Complication of anesthesia     slow wakeup      Malignant neoplasm of prostate metastatic to bone (H) 2/14/2019     Thyroid disease      PAST SURGICAL HISTORY:   Past Surgical History:   Procedure Laterality Date     CYSTOSCOPY      about 10 years ago     INSERT PORT VASCULAR ACCESS Right 5/2/2019    Procedure: Chest Port Placement;  Surgeon: Tate Burciaga PA-C;  Location: [facility] OR     IR CHEST PORT PLACEMENT > 5 YRS OF AGE  5/2/2019     LAPAROSCOPIC NEPHRECTOMY Right 3/19/2019    Procedure: Right laparoscopic radical nephrectomy;  Surgeon: Wesley Cleveland MD;  Location: [facility] OR      SOCIAL HISTORY:   Social History     Tobacco Use     Smoking status: Never Smoker     Smokeless tobacco: Never Used   Substance Use Topics     Alcohol use: Not on file     Comment: wine - very rare     Drug use: No     FAMILY HISTORY:   Family History   Problem Relation Age of Onset     Diabetes Father      ALLERGIES:   Allergies   Allergen Reactions     Doxycycline GI Disturbance     CURRENT MEDICATIONS:   Current Outpatient Medications:      amLODIPine (NORVASC) 10 MG tablet, Take 1 tablet (10 mg) by mouth daily, Disp: 30 tablet, Rfl: 0     atorvastatin (LIPITOR) 20 MG tablet, Take 20 mg by mouth daily, Disp: , Rfl:      calcium citrate-vitamin D (CITRACAL) 315-250 MG-UNIT TABS per tablet, Take 650 mg by mouth 2 times daily , Disp: , Rfl:      cetirizine " (ZYRTEC) 10 MG tablet, Take 10 mg by mouth as needed for allergies , Disp: , Rfl:      co-enzyme Q-10 100 MG CAPS capsule, Take 100 mg by mouth daily , Disp: , Rfl:      cycloSPORINE (RESTASIS) 0.05 % ophthalmic emulsion, Place 1 drop into both eyes 2 times daily , Disp: , Rfl:      dexamethasone (DECADRON) 4 MG/ML injection, Apply 1 mL (4 mg) topically once for 1 dose For physical therapy, iontophoresis, Disp: 30 mL, Rfl: 0     diclofenac (VOLTAREN) 1 % topical gel, Place 2 g onto the skin 4 times daily (Patient not taking: Reported on 1/6/2021), Disp: 100 g, Rfl: 1     fluticasone (FLONASE) 50 MCG/ACT nasal spray, Spray 2 sprays in nostril daily as needed , Disp: , Rfl:      Glucosamine-Chondroit-Vit C-Mn (GLUCOSAMINE 1500 COMPLEX) CAPS, Take 1 capsule by mouth daily, Disp: , Rfl:      levothyroxine (SYNTHROID/LEVOTHROID) 100 MCG tablet, Take 100 mcg by mouth daily, Disp: , Rfl:      MAGNESIUM PO, , Disp: , Rfl:      Multiple Vitamins-Minerals (MULTIVITAMIN ADULT EXTRA C PO), Take 1 tablet by mouth every 24 hours, Disp: , Rfl:      Nutritional Supplements (SALMON OIL) CAPS, Take 2 capsules by mouth daily , Disp: , Rfl:      omeprazole (PRILOSEC) 20 MG DR capsule, Take 20 mg by mouth daily, Disp: , Rfl:      tamsulosin (FLOMAX) 0.4 MG capsule, Take 1 capsule (0.4 mg) by mouth daily, Disp: 30 capsule, Rfl: 3    Current Facility-Administered Medications:      heparin 100 UNIT/ML injection 5 mL, 5 mL, Intracatheter, Q8H PRN, Nayla Murray PA-C, 5 mL at 07/07/21 0828     sodium chloride (PF) 0.9% PF flush 20 mL, 20 mL, Intracatheter, Q8H, Nayla Murray PA-C, 20 mL at 07/07/21 0827    PHYSICAL EXAMINATION:  VITALS: /83 (BP Location: Left arm, Patient Position: Sitting, Cuff Size: Adult Regular)   Pulse 63   Temp 98.1  F (36.7  C) (Oral)   Resp 16   Wt 111.1 kg (245 lb)   SpO2 98%   BMI 33.23 kg/m       ECOG PS 1.    General: Patient appears well in no acute distress.   Skin: No visualized rash or  "lesions on visualized skin  Eyes: EOMI, no erythema, sclera icterus or discharge noted  Resp: Appears to be breathing comfortably without accessory muscle usage, speaking in full sentences, no cough  MSK: Appears to have normal range of motion based on visualized movements  Neurologic: No apparent tremors, facial movements symmetric  Psych: affect normal, alert and oriented    LABORATORY DATA/IMAGING:     I personally reviewed labs and imaging today     ASSESSMENT & PLAN: Mr. Reyes is a delightful 61 year old gentleman with recently diagnosed metastatic castration sensitive prostate adenocarcinoma as well as an incidentally diagnosed stage III clear-cell renal cell carcinoma of the right kidney (s/p radical R nephrectomy 3/19/19), who is here for a follow-up visit after completion of docetaxel for prostate cancer.     1. Metastatic prostate cancer, with involvement of the bones and RPLN:   - Patient met the criteria for CHAARTED \"high-volume\" metastatic hormone-sensitive prostate cancer. He opted for docetaxel in combination with ADT (per JOSEFA, [id number], FELIPEEDSEEMA). Tolerated 6 cycles of docetaxel fairly well (5/8/19 through 8/21/19), with the exception of mild fatigue and mild neuropathy. Now on ADT and tolerating fairly well.  - Reviewed restaging CT CAP and NM Bone Scan (acutal images) from 6/30/21. Does note some progression of bone lesions, though with frequent bone scans, this can show some more variance than actual disease progression. Dr. Winters joined part of the visit and discussion. Since he has not had prostate directed therapies, will refer to RT for potential prostate RT as this could help LUT symptoms as well. He does have a large lesion in the C spine, though he is asymptomatic. Will get MRI of c spine to assess and then could have RT to this lesion as well.   - Continue leuprolide 22.5mg every 3 months, due today   -Will reach out to [facility] to help obtain Caris testing on original biopsy "   -Will follow-up with Dr. Winters in 1 month with labs prior      2. Bone metastases:   - Noted to have osseous metastatic disease at the time of diagnosis. Worsening mets seen on imaging today   - Encouraged to continue calcium and vitamin D supplementation.  - Will continue Zometa 4mg IV every 3 months, will get dose today.Reassess for continued therapy in April 2022.     3. Stage 3, UISS-high risk ccRCC:  - pproximately 40-50% risk of recurrence after definitive surgery.   - no clear evidence of residual disease at this time; the retroperitoneal lymphadenopathy is more consistent with metastatic prostate cancer   - Continue active surveillance with self-reporting for signs/symptoms of recurrence (this was previously explained in detail) and periodic H&P and scans.       Nayla Murray PA-C  [facility]  [address] [place] [address] [phone]

## 2021-07-07 NOTE — PROGRESS NOTES
Infusion Nursing Note:  Walter Reyes presents today for zometa, lupron.    Patient seen by provider today: Yes: KADEN Thorpe   present during visit today: Not Applicable.    Note: N/A.    Intravenous Access:  Implanted Port.    Treatment Conditions:  Lab Results   Component Value Date    HGB 13.6 07/07/2021     Lab Results   Component Value Date    WBC 7.6 07/07/2021      Lab Results   Component Value Date    ANEU 5.0 07/07/2021     Lab Results   Component Value Date     07/07/2021      Lab Results   Component Value Date     07/07/2021                   Lab Results   Component Value Date    POTASSIUM 4.1 07/07/2021           No results found for: MAG         Lab Results   Component Value Date    CR 0.99 07/07/2021                   Lab Results   Component Value Date    BETHANY 8.9 07/07/2021                Lab Results   Component Value Date    BILITOTAL 0.7 07/07/2021           Lab Results   Component Value Date    ALBUMIN 3.9 07/07/2021                    Lab Results   Component Value Date    ALT 25 07/07/2021           Lab Results   Component Value Date    AST 17 07/07/2021       Results reviewed, labs MET treatment parameters, ok to proceed with treatment.      Post Infusion Assessment:  Patient tolerated infusion without incident.  Patient tolerated injection without incident.  Lupron injection given IM into the Left Ventrogluteal   Blood return noted pre and post infusion.  Site patent and intact, free from redness, edema or discomfort.  No evidence of extravasations.  Access discontinued per protocol.       Discharge Plan:   Patient declined prescription refills.  Discharge instructions reviewed with: Patient.  Patient and/or family verbalized understanding of discharge instructions and all questions answered.  AVS to patient via [employer].  Patient will return in one month for next appointment, scheduling pending at the time of discharge  Patient discharged in stable  condition accompanied by: self.  Departure Mode: Ambulatory.  Face to Face time: 0.      Gayle Chapa RN

## 2021-07-07 NOTE — NURSING NOTE
Oncology Rooming Note    July 7, 2021 9:36 AM   Walter Reyes is a 61 year old male who presents for:    Chief Complaint   Patient presents with     Port Draw     Labs drawn via PORT by RN in lab. VS taken.      Oncology Clinic Visit     Pt is here for a rtn for Renal Cell Cancer     Initial Vitals: Blood Pressure 125/83 (BP Location: Left arm, Patient Position: Sitting, Cuff Size: Adult Regular)   Pulse 63   Temperature 98.1  F (36.7  C) (Oral)   Respiration 16   Weight 111.1 kg (245 lb)   Oxygen Saturation 98%   Body Mass Index 33.23 kg/m   Estimated body mass index is 33.23 kg/m  as calculated from the following:    Height as of 8/4/20: 1.829 m (6').    Weight as of this encounter: 111.1 kg (245 lb). Body surface area is 2.38 meters squared.  No Pain (0) Comment: Data Unavailable   No LMP for male patient.  Allergies reviewed: Yes  Medications reviewed: Yes    Medications: Medication refills not needed today.  Pharmacy name entered into EPIC:    [facility] [place], [place] - [address] [facility] [id number] [place], [place] - [address]    Clinical concerns: none       Beata Muhammad MA

## 2021-07-07 NOTE — LETTER
"    7/7/2021         RE: Walter Reyes  [address] [facility] [address]        Dear Colleague,    Thank you for referring your patient, Walter Reyes, to the [facility]. Please see a copy of my visit note below.    MEDICAL ONCOLOGY FOLLOW-UP NOTE  Jul 7, 2021    REASON FOR VISIT: Follow-up for metastatic hormone-sensitive prostate cancer, currently on ADT alone.    HISTORY OF PRESENT ILLNESS: Mr. 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 His oncologic history is detailed below.     -worsening urinary symptoms at night  -also notes that his stamina is lower  -neuropathy in feet is overall stable     ONCOLOGIC HISTORY:  1. 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 Naylor at [facility]. Prior PSA were 1.4 on 8/16/17, 2.4 on 6/28/16, 2.9 on 6/28/16, and as low as 0.4 on 3/26/2003.   - 1/08/2019: Consultation with Sarah Wilson CNP in Urology clinic. Repeat PSA 9.6.  - 1/16/2019: MRI prostate with contrast - \"This examination is characterized as PIRADS 5- very high probability. Clinically significant cancer is highly likely to be present. There is a large, invasive mass arising from the right peripheral zone and extending into the neurovascular bundle, seminal vesicle, and along the anterolateral right mesorectal fascia. Metastatic right external iliac lymph node. Metastatic lesion in the posterior/superior right acetabulum.\"  - 1/25/2019: CT abdomen and pelvis with IV contrast - \"1. Heterogeneous enhancing mass posteriorly in the upper pole of the right kidney measures 4.5 x 5.8 x 5.7 cm (AP by transverse by craniocaudal). It has a " "small nodular component extending posterior medially which abuts the right psoas muscle. This nodular extension measures 2.1 x 2.1 cm. Minimal stranding about the mass. No definite thrombus within the right renal vein. This renal mass is compatible with a renal cell carcinoma. A paraaortic lymph node situated immediately posterior to the left renal vein measures 1.1 cm in short axis, suspicious for metastasis. 2. A 2 cm right external iliac lymph node is also suspicious for metastases. 3. Multiple sclerotic osseous lesions suspicious for metastases. These include 0.8 and 0.6 cm sclerotic lesions laterally in the right iliac wing (images 53 and 62 respectively). Ill-defined groundglass density laterally in the right acetabulum measuring 1.7 x 1.2 cm corresponds with the lesion identified on MRI. A 0.4 cm groundglass density in the left acetabulum. Sclerotic lesion in the left femoral neck measures 0.9 x 1.6 cm (image 81). Sclerotic metastases would be more compatible with prostate metastases. 4. A 3 subcentimeter hepatic lesions are indeterminate. Metastases would be a consideration. These can be further characterized with liver MRI.\"  - 1/25/2019: NM bone scan - \"There is focal bony uptake in the left femoral neck, right acetabulum, right of midline at the S1 or L5 level of the spine, within multiple bilateral ribs, in the C7 or T1 level of the spine, and anteriorly within the skull. Findings are suspicious for metastases.\"  - 1/31/19: CT chest with contrast - \" No suspicious nodules in the chest.  Stable appearance of a 5.8 cm right renal mass concerning for renal carcinoma until proven otherwise.  Stable indeterminant subcentimeter hypodensities in the liver.  Multifocal osteoblastic metastasis including 2.5 cm lesion in the right fifth rib posteriorly and a 1.6 cm lesion in the right third rib posteriorly. No lytic lesions identified.\"  - 2/6/19: CT-guided right sclerotic fifth rib lesion biopsy - \"Metastatic " "carcinoma, consistent with prostate primary.  Immunohistochemical stains performed show the metastatic carcinoma stains positive for NKX3.1 (prostate marker), negative for STACIE 3 and PAX8, which supports the above diagnosis.\"  - 2/15/19: Started Casodex 50mg every day and consented for the biobanking protocol. 3/18/19 - stopped Casodex.  - 2/19/19: Case discussed in tumor board - recommendation for nephectomy for suspected malignant right renal mass.   - 2/26/19: Started Lupron 22.5mg every 3 months.   - 5/8/19: Started Docetaxel 75mg/m2 IV every 3 weeks. 06/18/19 - cycle 3, 7/10/19 - cycle 4, 07/31/19 - cycle 5, 08/21/19 - cycle 6.   - 10/2/19: Restaging CT CAP and NM Bone Scan showed improved osseous disease, no evidence of recurrent or new metastatic disease.  - 1/5/20: Restaging CT CAP and NM Bone Scan showed stable osseous disease, no evidence of recurrent or new metastatic disease.  - 4/6/20: NM bone scan with slightly improved uptake in known skeletal mets. CT C/A/P with contrast with stable osseous mets, no yusuf enlargement, no new visceral mets etc.  - 04/08/20: Zometa every 3 months.  - 10/5/20: CT C/A/P with contrast and NM bone scan - SD.   - 1/4/21: CT C/A/P with contrast and NM bone scan - SD but slight increase in right 5th rib tracer uptake and in posterior L5 sclerosis.   - 03/29/21: CT C/A/P with contrast - single new right sacral 8mm bone lesion (suspicious), other bone mets stable. No visceral/yusuf disease. Nephrectomy site without recurrence. NM bone scan - SD but \"increased uptake associated with the prior lesions of the lower  cervical spine, posterior right fourth rib and right L5 posterior arch elements. Otherwise unchanged uptake associated with the proximal left femur and left anterior fourth rib. Similar uptake of the left halux, possibly secondary to degenerative osteoarthritis or gout.\"     2. Stage III ([id number]), grade 3 of 4, clear-cell RCC of right kidney:  - Incidentally " "diagnosed as above.   - Underwent curative-intent robotic right radical nephrectomy with Dr. Wesley Cleveland on 3/19/19. No tumor spillage per op report.   - Path showed: \"Histologic Type: Clear cell renal cell carcinoma; Sarcomatoid Features: Not identified; Histologic Grade: Nucleolar grade 3 (WHO/ISUP). Extent Tumor Size: 4.3 cm. Microscopic Tumor Extension: Tumor extension into renal sinus (in vascular structures). Margins: Negative. Tumor Necrosis: Present; focal. Lymph-Vascular Invasion: Not identified.  Pathologic Staging (pTNM) Primary Tumor (pT): pT3a: Tumor extends into renal vein branches/renal sinus. Regional Lymph Nodes (pN): pNX. Number of Lymph Nodes Examined: 0 Distant Metastasis (pM): pM N/A.\"  - Restaging scans as above.    PAST MEDICAL HISTORY:  Past Medical History:   Diagnosis Date     Cancer of kidney, right (H)      Complication of anesthesia     slow wakeup      Malignant neoplasm of prostate metastatic to bone (H) 2/14/2019     Thyroid disease      PAST SURGICAL HISTORY:   Past Surgical History:   Procedure Laterality Date     CYSTOSCOPY      about 10 years ago     INSERT PORT VASCULAR ACCESS Right 5/2/2019    Procedure: Chest Port Placement;  Surgeon: Tate Burciaga PA-C;  Location: [facility] OR     IR CHEST PORT PLACEMENT > 5 YRS OF AGE  5/2/2019     LAPAROSCOPIC NEPHRECTOMY Right 3/19/2019    Procedure: Right laparoscopic radical nephrectomy;  Surgeon: Wesley Cleveland MD;  Location:  OR      SOCIAL HISTORY:   Social History     Tobacco Use     Smoking status: Never Smoker     Smokeless tobacco: Never Used   Substance Use Topics     Alcohol use: Not on file     Comment: wine - very rare     Drug use: No     FAMILY HISTORY:   Family History   Problem Relation Age of Onset     Diabetes Father      ALLERGIES:   Allergies   Allergen Reactions     Doxycycline GI Disturbance     CURRENT MEDICATIONS:   Current Outpatient Medications:      amLODIPine (NORVASC) 10 MG tablet, Take 1 " tablet (10 mg) by mouth daily, Disp: 30 tablet, Rfl: 0     atorvastatin (LIPITOR) 20 MG tablet, Take 20 mg by mouth daily, Disp: , Rfl:      calcium citrate-vitamin D (CITRACAL) 315-250 MG-UNIT TABS per tablet, Take 650 mg by mouth 2 times daily , Disp: , Rfl:      cetirizine (ZYRTEC) 10 MG tablet, Take 10 mg by mouth as needed for allergies , Disp: , Rfl:      co-enzyme Q-10 100 MG CAPS capsule, Take 100 mg by mouth daily , Disp: , Rfl:      cycloSPORINE (RESTASIS) 0.05 % ophthalmic emulsion, Place 1 drop into both eyes 2 times daily , Disp: , Rfl:      dexamethasone (DECADRON) 4 MG/ML injection, Apply 1 mL (4 mg) topically once for 1 dose For physical therapy, iontophoresis, Disp: 30 mL, Rfl: 0     diclofenac (VOLTAREN) 1 % topical gel, Place 2 g onto the skin 4 times daily (Patient not taking: Reported on 1/6/2021), Disp: 100 g, Rfl: 1     fluticasone (FLONASE) 50 MCG/ACT nasal spray, Spray 2 sprays in nostril daily as needed , Disp: , Rfl:      Glucosamine-Chondroit-Vit C-Mn (GLUCOSAMINE 1500 COMPLEX) CAPS, Take 1 capsule by mouth daily, Disp: , Rfl:      levothyroxine (SYNTHROID/LEVOTHROID) 100 MCG tablet, Take 100 mcg by mouth daily, Disp: , Rfl:      MAGNESIUM PO, , Disp: , Rfl:      Multiple Vitamins-Minerals (MULTIVITAMIN ADULT EXTRA C PO), Take 1 tablet by mouth every 24 hours, Disp: , Rfl:      Nutritional Supplements (SALMON OIL) CAPS, Take 2 capsules by mouth daily , Disp: , Rfl:      omeprazole (PRILOSEC) 20 MG DR capsule, Take 20 mg by mouth daily, Disp: , Rfl:      tamsulosin (FLOMAX) 0.4 MG capsule, Take 1 capsule (0.4 mg) by mouth daily, Disp: 30 capsule, Rfl: 3    Current Facility-Administered Medications:      heparin 100 UNIT/ML injection 5 mL, 5 mL, Intracatheter, Q8H PRN, Nayla Murray PA-C, 5 mL at 07/07/21 0828     sodium chloride (PF) 0.9% PF flush 20 mL, 20 mL, Intracatheter, Q8H, Nayla Murray PA-C, 20 mL at 07/07/21 0827    PHYSICAL EXAMINATION:  VITALS: /83 (BP Location:  "Left arm, Patient Position: Sitting, Cuff Size: Adult Regular)   Pulse 63   Temp 98.1  F (36.7  C) (Oral)   Resp 16   Wt 111.1 kg (245 lb)   SpO2 98%   BMI 33.23 kg/m       ECOG PS 1.    General: Patient appears well in no acute distress.   Skin: No visualized rash or lesions on visualized skin  Eyes: EOMI, no erythema, sclera icterus or discharge noted  Resp: Appears to be breathing comfortably without accessory muscle usage, speaking in full sentences, no cough  MSK: Appears to have normal range of motion based on visualized movements  Neurologic: No apparent tremors, facial movements symmetric  Psych: affect normal, alert and oriented    LABORATORY DATA/IMAGING:     I personally reviewed labs and imaging today     ASSESSMENT & PLAN: Mr. Reyes is a delightful 61 year old gentleman with recently diagnosed metastatic castration sensitive prostate adenocarcinoma as well as an incidentally diagnosed stage III clear-cell renal cell carcinoma of the right kidney (s/p radical R nephrectomy 3/19/19), who is here for a follow-up visit after completion of docetaxel for prostate cancer.     1. Metastatic prostate cancer, with involvement of the bones and RPLN:   - Patient met the criteria for CHAARTED \"high-volume\" metastatic hormone-sensitive prostate cancer. He opted for docetaxel in combination with ADT (per JOSEFA, [id number], KARLA). Tolerated 6 cycles of docetaxel fairly well (5/8/19 through 8/21/19), with the exception of mild fatigue and mild neuropathy. Now on ADT and tolerating fairly well.  - Reviewed restaging CT CAP and NM Bone Scan (acutal images) from 6/30/21. Does note some progression of bone lesions, though with frequent bone scans, this can show some more variance than actual disease progression. Dr. Winters joined part of the visit and discussion. Since he has not had prostate directed therapies, will refer to RT for potential prostate RT as this could help LUT symptoms as well. He does " have a large lesion in the C spine, though he is asymptomatic. Will get MRI of c spine to assess and then could have RT to this lesion as well.   - Continue leuprolide 22.5mg every 3 months, due today   -Will reach out to [facility] to help obtain Caris testing on original biopsy   -Will follow-up with Dr. Winters in 1 month with labs prior      2. Bone metastases:   - Noted to have osseous metastatic disease at the time of diagnosis. Worsening mets seen on imaging today   - Encouraged to continue calcium and vitamin D supplementation.  - Will continue Zometa 4mg IV every 3 months, will get dose today.Reassess for continued therapy in April 2022.     3. Stage 3, UISS-high risk ccRCC:  - pproximately 40-50% risk of recurrence after definitive surgery.   - no clear evidence of residual disease at this time; the retroperitoneal lymphadenopathy is more consistent with metastatic prostate cancer   - Continue active surveillance with self-reporting for signs/symptoms of recurrence (this was previously explained in detail) and periodic H&P and scans.       Nayla Murray PA-C  [facility]  [address] [place] [address] [id number] [phone]        Again, thank you for allowing me to participate in the care of your patient.        Sincerely,        Nayla Murray PA-C

## 2021-07-07 NOTE — PATIENT INSTRUCTIONS
[facility] Triage and after hours / weekends / holidays:  [phone]    Please call the triage or after hours line if you experience a temperature greater than or equal to 100.5, shaking chills, have uncontrolled nausea, vomiting and/or diarrhea, dizziness, shortness of breath, chest pain, bleeding, unexplained bruising, or if you have any other new/concerning symptoms, questions or concerns.      If you are having any concerning symptoms or wish to speak to a provider before your next infusion visit, please call your care coordinator or triage to notify them so we can adequately serve you.     If you need a refill on a narcotic prescription or other medication, please call before your infusion appointment.           July 2021 Sunday Monday Tuesday Wednesday Thursday Friday Saturday                       1     2     3       4     5     6     7    LAB CENTRAL   8:00 AM   (15 min.)   [facility] LAB DRAW   [facility]    RETURN   8:15 AM   (45 min.)   Nayla Murray PA-C   [facility]    ONC INFUSION 0.5 HR (30 MIN)  10:00 AM   (30 min.)    ONC INFUSION NURSE   [facility] 8     9     10       11     12     13     14     15     16     17       18     19     20     21     22     23     24       25     26     27     28     29     30     31                 August 2021 Sunday Monday Tuesday Wednesday Thursday Friday Saturday   1     2     3     4     5     6     7       8     9     10     11     12     13     14       15     16     17     18     19     20     21       22     23     24     25     26     27     28       29     30     31                                        Recent Results (from the past 24 hour(s))   Lactate Dehydrogenase    Collection Time: 07/07/21  8:34 AM   Result Value Ref Range    Lactate Dehydrogenase 155 85 - 227 U/L   TSH with free T4 reflex    Collection Time: 07/07/21  8:34 AM   Result Value Ref  Range    TSH 1.67 0.40 - 4.00 mU/L   PSA tumor marker    Collection Time: 07/07/21  8:34 AM   Result Value Ref Range    PSA 0.47 0 - 4 ug/L   Comprehensive metabolic panel    Collection Time: 07/07/21  8:34 AM   Result Value Ref Range    Sodium 139 133 - 144 mmol/L    Potassium 4.1 3.4 - 5.3 mmol/L    Chloride 107 94 - 109 mmol/L    Carbon Dioxide 25 20 - 32 mmol/L    Anion Gap 7 3 - 14 mmol/L    Glucose 105 (H) 70 - 99 mg/dL    Urea Nitrogen 15 7 - 30 mg/dL    Creatinine 0.99 0.66 - 1.25 mg/dL    GFR Estimate 82 >60 mL/min/[1.73_m2]    GFR Estimate If Black >90 >60 mL/min/[1.73_m2]    Calcium 8.9 8.5 - 10.1 mg/dL    Bilirubin Total 0.7 0.2 - 1.3 mg/dL    Albumin 3.9 3.4 - 5.0 g/dL    Protein Total 7.4 6.8 - 8.8 g/dL    Alkaline Phosphatase 70 40 - 150 U/L    ALT 25 0 - 70 U/L    AST 17 0 - 45 U/L   CBC with platelets differential    Collection Time: 07/07/21  8:34 AM   Result Value Ref Range    WBC 7.6 4.0 - 11.0 10e9/L    RBC Count 4.44 4.4 - 5.9 10e12/L    Hemoglobin 13.6 13.3 - 17.7 g/dL    Hematocrit 39.7 (L) 40.0 - 53.0 %    MCV 89 78 - 100 fl    MCH 30.6 26.5 - 33.0 pg    MCHC 34.3 31.5 - 36.5 g/dL    RDW 13.1 10.0 - 15.0 %    Platelet Count 196 150 - 450 10e9/L    Diff Method Automated Method     % Neutrophils 65.6 %    % Lymphocytes 21.6 %    % Monocytes 8.0 %    % Eosinophils 4.2 %    % Basophils 0.3 %    % Immature Granulocytes 0.3 %    Nucleated RBCs 0 0 /100    Absolute Neutrophil 5.0 1.6 - 8.3 10e9/L    Absolute Lymphocytes 1.6 0.8 - 5.3 10e9/L    Absolute Monocytes 0.6 0.0 - 1.3 10e9/L    Absolute Eosinophils 0.3 0.0 - 0.7 10e9/L    Absolute Basophils 0.0 0.0 - 0.2 10e9/L    Abs Immature Granulocytes 0.0 0 - 0.4 10e9/L    Absolute Nucleated RBC 0.0

## 2021-07-08 LAB — TESTOST SERPL-MCNC: 6 NG/DL (ref 240–950)

## 2021-07-12 ENCOUNTER — HOSPITAL ENCOUNTER (OUTPATIENT)
Dept: MRI IMAGING | Facility: CLINIC | Age: 61
Discharge: HOME | End: 2021-07-12
Attending: PHYSICIAN ASSISTANT | Admitting: PHYSICIAN ASSISTANT
Payer: COMMERCIAL

## 2021-07-12 DIAGNOSIS — C79.51 MALIGNANT NEOPLASM OF PROSTATE METASTATIC TO BONE (H): ICD-10-CM

## 2021-07-12 DIAGNOSIS — C61 MALIGNANT NEOPLASM OF PROSTATE METASTATIC TO BONE (H): ICD-10-CM

## 2021-07-12 PROCEDURE — 255N000002 HC RX 255 OP 636: Performed by: PHYSICIAN ASSISTANT

## 2021-07-12 PROCEDURE — 72156 MRI NECK SPINE W/O & W/DYE: CPT

## 2021-07-12 PROCEDURE — A9585 GADOBUTROL INJECTION: HCPCS | Performed by: PHYSICIAN ASSISTANT

## 2021-07-12 RX ORDER — GADOBUTROL 604.72 MG/ML
11 INJECTION INTRAVENOUS ONCE
Status: COMPLETED | OUTPATIENT
Start: 2021-07-12 | End: 2021-07-12

## 2021-07-12 RX ADMIN — GADOBUTROL 11 ML: 604.72 INJECTION INTRAVENOUS at 14:25

## 2021-07-14 ENCOUNTER — PATIENT OUTREACH (OUTPATIENT)
Dept: ONCOLOGY | Facility: CLINIC | Age: 61
End: 2021-07-14

## 2021-07-16 ENCOUNTER — TELEPHONE (OUTPATIENT)
Dept: ONCOLOGY | Facility: CLINIC | Age: 61
End: 2021-07-16

## 2021-07-16 ENCOUNTER — PATIENT OUTREACH (OUTPATIENT)
Dept: ONCOLOGY | Facility: CLINIC | Age: 61
End: 2021-07-16

## 2021-07-16 DIAGNOSIS — C79.51 MALIGNANT NEOPLASM OF PROSTATE METASTATIC TO BONE (H): Primary | ICD-10-CM

## 2021-07-16 DIAGNOSIS — C61 MALIGNANT NEOPLASM OF PROSTATE METASTATIC TO BONE (H): Primary | ICD-10-CM

## 2021-07-16 NOTE — PROGRESS NOTES
Nutrition Services - oncology distress screening:    Called Walter today per his request from oncology distress screening 7/7/21.    He tells me that he and his wife are confused on what diet to follow with prostate cancer.  They have tried many diets and have a tough time adhering to any diet.  He has most recently tried the vegetarian diet which he calls the 'no fun diet'.      He and his wife are looking for further guidance on diet with cancer.  He would like to set up a time to schedule consult with RD.      Interventions:   Message sent to scheduling to reach out to patient to schedule RD consult.   Message sent to [facility] for nutrition referral.     Darryle expressed his appreciation for the phone call.     Aaliyah Fofana RD, [facility]  [phone]

## 2021-07-20 ENCOUNTER — VIRTUAL VISIT (OUTPATIENT)
Dept: ONCOLOGY | Facility: CLINIC | Age: 61
End: 2021-07-20
Attending: INTERNAL MEDICINE
Payer: COMMERCIAL

## 2021-07-20 DIAGNOSIS — C61 MALIGNANT NEOPLASM OF PROSTATE METASTATIC TO BONE (H): ICD-10-CM

## 2021-07-20 DIAGNOSIS — C79.51 MALIGNANT NEOPLASM OF PROSTATE METASTATIC TO BONE (H): ICD-10-CM

## 2021-07-20 PROCEDURE — 97802 MEDICAL NUTRITION INDIV IN: CPT | Mod: GT | Performed by: DIETITIAN, REGISTERED

## 2021-07-20 NOTE — LETTER
7/20/2021         RE: Walter Reyes  [address]  [facility] [address]        Dear Colleague,    Thank you for referring your patient, Walter Reyes, to the [facility]. Please see a copy of my visit note below.    Video-Visit Details    Type of service:  Video Visit    Video Start Time (time video started): 8:00    Video End Time (time video stopped): 8:55    Originating Location (pt. Location): Home    Distant Location (provider location):  [facility] -remote    Mode of Communication:  Video Conference via [facility]    CLINICAL NUTRITION SERVICES - ASSESSMENT NOTE  Walter Reyes 61 year old referred for MNT related to Malignant neoplasm of prostate metastatic to bone (H) [C61, C79.51]    Time Spent: 55 minutes  Visit Type: Initial   Pt accompanied by: wife, Micheline  Referring Physician: Dr. Winters     NUTRITION HISTORY  Factors affecting nutrition intake include:none at this time   Current diet/appetite: regular/good  Chemotherapy: Docetaxel-completed   Radiation: will be starting     Food Security: any concerns about having enough money to buy food or access to grocery stores? no    Patient questioning what type of diet to follow.  Patient has tried to follow vegan diet but states that was the no fun diet.  Patient tried keto, Mediterranean (recommended by MD) and intermittent fasting 16/8.  Patient eating 2 meals per day and snacks as trying to lose weight.  Patient finds that he may eat fast food on the weekend when involved with house projects.  Patient states his oncologist would like him to lose weight due to type of cancer.       [place] milk shake (powdered greens, fiber mix)   1:00 green pea pod crisps  caprese salad-homemade  1/2 3 meat plate from [employer]   6:30 Greek plain yogurt with blueberries + 1 T honey/monkfruit     Wedge salad -blue cheese, tomatoes, onions     Potluck with friends/family-potato salad,  "coleslaw, grilled burger-no bun, ice cream bar, pasta salad    Diet Recall  Breakfast None due to fasting    Lunch Quick oats + 1 T maple syrup with pecans; small salad with blue cheese;    Dinner Laura seared halibut with chickpea tabouli; pizza from food truck (sausage, green pepper and pepperoni)    Snacks footlong hot dog at [employer]; popcorn made with coconut milk; peanuts    Beverages Coffee 1 T half and half + 1 shot sugar free flavoring      ANTHROPOMETRICS  Height: 6'0\"  Weight: 245 lbs   BMI: 33.2 kg/m2   Weight Status:  Obesity Grade I BMI 30-34.9  IBW: 178 lbs +/-10 (137%)  Weight History:   Wt Readings from Last 10 Encounters:   07/07/21 111.1 kg (245 lb)   03/31/21 115.2 kg (253 lb 14.4 oz)   03/31/21 114.8 kg (253 lb)   01/06/21 115.2 kg (254 lb)   10/07/20 108.4 kg (239 lb)   08/04/20 107.5 kg (237 lb)   07/10/20 110.3 kg (243 lb 1.6 oz)   04/08/20 115.6 kg (254 lb 12.8 oz)   02/03/20 111.1 kg (245 lb)   01/07/20 111.2 kg (245 lb 1.6 oz)     Dosing Weight: 111.1 kg    Medications:   Reviewed    Labs:   Labs reviewed    NUTRITION FOCUSED PHYSICAL ASSESSMENT FOR DIAGNOSING MALNUTRITION:  Yes-visual    No physical related nutritional findings    ASSESSED NUTRITION NEEDS:  Estimated Energy Needs: 2649-9772 kcals (14-17 Kcal/Kg)  Justification: obese  Estimated Protein Needs:  grams protein (1.2-1.5 g pro/Kg)  Justification: preservation of lean body mass  Estimated Fluid Needs: 5043-9843  mL   Justification: maintenance    MALNUTRITION:  % Weight Loss:  None noted  % Intake:  No decreased intake noted  Subcutaneous Fat Loss:  None observed  Muscle Loss:  None observed  Fluid Retention:  None noted    Malnutrition Diagnosis: Patient does not meet two of the above criteria necessary for diagnosing malnutrition    NUTRITION DIAGNOSIS:  Food and nutrition-related knowledge deficit related to lack of prior exposure as evidenced by no previous need for food and nutrition related recommendations "     INTERVENTIONS  Provided written & verbal education:     - Reviewed nutrition needs for balanced, weight loss diet.  Instructed patient on label reading with focus on portion sizes, carbohydrate intake and added sugars.  Recommend 45 grams carbohydrate per meal + 15 grams carbohydrate as snack with <36 grams added sugar per day to be included in total carbohydrate intake.  Reviewed basics of Mediterranean diet.  Also suggest 1/2 plate filled with vegetables with reduction in red meat consumption and processed foods.  Congratulated patient on effort to make heathy food choices and weight loss with different diets.     - Reviewed foods to fight cancer with emphasis on whole grains, fruit, vegetables, legumes and lean proteins. Suggest [employer] [place] [employer] as resource for patient.  Also recommend oncology nutrition website regarding sugar and cancer.    - Discussed cancer fatigue.  Recommend daily movement to improve fatigue.  Patient may benefit from cancer rehab and states feels like he is losing strength.  Patient states when works from home gets 1041-7478 steps per day.  Suggest 150 minutes of exercise per week if aiming to lose weight.    Provided pt with corresponding education materials/handouts on:  Academy of Nutrition and Dietetics Count Your Carbs; Academy of Nutrition and Dietetics High Protein list     Pt verbalize understanding of materials provided during consult.   Patient Understanding: good  Expected patient engagement: good      Implementation  Composition of Meals and Snacks and General/healthful diet    Goals  1.  Aim for <165 grams carbohydrate and <36 grams added sugar per day   2.  Include protein source at every meal     Follow-Up Plans: Patient to follow up in 4 weeks.  RD name and number provided     MONITORING AND EVALUATION:  -Food/beverage intake  -Weight trends    Jessica Bradshaw, RD, LD  Clinical Dietitian  [facility]  [facility]  [phone] (direct)          Again, thank you for allowing me to participate in the care of your patient.        Sincerely,        Chacorta Rossi RD, LD

## 2021-07-20 NOTE — PROGRESS NOTES
Video-Visit Details    Type of service:  Video Visit    Video Start Time (time video started): 8:00    Video End Time (time video stopped): 8:55    Originating Location (pt. Location): Home    Distant Location (provider location):  [facility] -remote    Mode of Communication:  Video Conference via [facility]    CLINICAL NUTRITION SERVICES - ASSESSMENT NOTE  Walter Reyes 61 year old referred for MNT related to Malignant neoplasm of prostate metastatic to bone (H) [C61, C79.51]    Time Spent: 55 minutes  Visit Type: Initial   Pt accompanied by: wife, Micheline  Referring Physician: Dr. Winters     NUTRITION HISTORY  Factors affecting nutrition intake include:none at this time   Current diet/appetite: regular/good  Chemotherapy: Docetaxel-completed   Radiation: will be starting     Food Security: any concerns about having enough money to buy food or access to grocery stores? no    Patient questioning what type of diet to follow.  Patient has tried to follow vegan diet but states that was the no fun diet.  Patient tried keto, Mediterranean (recommended by MD) and intermittent fasting 16/8.  Patient eating 2 meals per day and snacks as trying to lose weight.  Patient finds that he may eat fast food on the weekend when involved with house projects.  Patient states his oncologist would like him to lose weight due to type of cancer.       [place] milk shake (powdered greens, fiber mix)   1:00 green pea pod crisps  caprese salad-homemade  1/2 3 meat plate from [employer]   6:30 Greek plain yogurt with blueberries + 1 T honey/monkfruit     Wedge salad -blue cheese, tomatoes, onions     Potluck with friends/family-potato salad, coleslaw, grilled burger-no bun, ice cream bar, pasta salad    Diet Recall  Breakfast None due to fasting    Lunch Quick oats + 1 T maple syrup with pecans; small salad with blue cheese;    Dinner Laura seared halibut with chickpea tabouli; pizza from food truck (sausage,  "green pepper and pepperoni)    Snacks footlong hot dog at [employer]; popcorn made with coconut milk; peanuts    Beverages Coffee 1 T half and half + 1 shot sugar free flavoring      ANTHROPOMETRICS  Height: 6'0\"  Weight: 245 lbs   BMI: 33.2 kg/m2   Weight Status:  Obesity Grade I BMI 30-34.9  IBW: 178 lbs +/-10 (137%)  Weight History:   Wt Readings from Last 10 Encounters:   07/07/21 111.1 kg (245 lb)   03/31/21 115.2 kg (253 lb 14.4 oz)   03/31/21 114.8 kg (253 lb)   01/06/21 115.2 kg (254 lb)   10/07/20 108.4 kg (239 lb)   08/04/20 107.5 kg (237 lb)   07/10/20 110.3 kg (243 lb 1.6 oz)   04/08/20 115.6 kg (254 lb 12.8 oz)   02/03/20 111.1 kg (245 lb)   01/07/20 111.2 kg (245 lb 1.6 oz)     Dosing Weight: 111.1 kg    Medications:   Reviewed    Labs:   Labs reviewed    NUTRITION FOCUSED PHYSICAL ASSESSMENT FOR DIAGNOSING MALNUTRITION:  Yes-visual    No physical related nutritional findings    ASSESSED NUTRITION NEEDS:  Estimated Energy Needs: 7639-7409 kcals (14-17 Kcal/Kg)  Justification: obese  Estimated Protein Needs:  grams protein (1.2-1.5 g pro/Kg)  Justification: preservation of lean body mass  Estimated Fluid Needs: 4785-4114  mL   Justification: maintenance    MALNUTRITION:  % Weight Loss:  None noted  % Intake:  No decreased intake noted  Subcutaneous Fat Loss:  None observed  Muscle Loss:  None observed  Fluid Retention:  None noted    Malnutrition Diagnosis: Patient does not meet two of the above criteria necessary for diagnosing malnutrition    NUTRITION DIAGNOSIS:  Food and nutrition-related knowledge deficit related to lack of prior exposure as evidenced by no previous need for food and nutrition related recommendations     INTERVENTIONS  Provided written & verbal education:     - Reviewed nutrition needs for balanced, weight loss diet.  Instructed patient on label reading with focus on portion sizes, carbohydrate intake and added sugars.  Recommend 45 grams carbohydrate per meal + 15 grams " carbohydrate as snack with <36 grams added sugar per day to be included in total carbohydrate intake.  Reviewed basics of Mediterranean diet.  Also suggest 1/2 plate filled with vegetables with reduction in red meat consumption and processed foods.  Congratulated patient on effort to make heathy food choices and weight loss with different diets.     - Reviewed foods to fight cancer with emphasis on whole grains, fruit, vegetables, legumes and lean proteins. Suggest [employer] as resource for patient.  Also recommend oncology nutrition website regarding sugar and cancer.    - Discussed cancer fatigue.  Recommend daily movement to improve fatigue.  Patient may benefit from cancer rehab and states feels like he is losing strength.  Patient states when works from home gets 7805-6133 steps per day.  Suggest 150 minutes of exercise per week if aiming to lose weight.    Provided pt with corresponding education materials/handouts on:  Academy of Nutrition and Dietetics Count Your Carbs; Academy of Nutrition and Dietetics High Protein list     Pt verbalize understanding of materials provided during consult.   Patient Understanding: good  Expected patient engagement: good      Implementation  Composition of Meals and Snacks and General/healthful diet    Goals  1.  Aim for <165 grams carbohydrate and <36 grams added sugar per day   2.  Include protein source at every meal     Follow-Up Plans: Patient to follow up in 4 weeks.  RD name and number provided     MONITORING AND EVALUATION:  -Food/beverage intake  -Weight trends    Jessica Bradshaw, RD, LD  Clinical Dietitian  [facility]  [phone] (direct)

## 2021-07-20 NOTE — LETTER
7/20/2021         RE: Walter Reyes  [address]  [facility] [address]        Dear Colleague,    Thank you for referring your patient, Walter Reyes, to the [facility]. Please see a copy of my visit note below.    Video-Visit Details    Type of service:  Video Visit    Video Start Time (time video started): 8:00    Video End Time (time video stopped): 8:55    Originating Location (pt. Location): Home    Distant Location (provider location):  [facility] -remote    Mode of Communication:  Video Conference via [facility]    CLINICAL NUTRITION SERVICES - ASSESSMENT NOTE  Walter Reyes 61 year old referred for MNT related to Malignant neoplasm of prostate metastatic to bone (H) [C61, C79.51]    Time Spent: 55 minutes  Visit Type: Initial   Pt accompanied by: wife, Micheline  Referring Physician: Dr. Winters     NUTRITION HISTORY  Factors affecting nutrition intake include:none at this time   Current diet/appetite: regular/good  Chemotherapy: Docetaxel-completed   Radiation: will be starting     Food Security: any concerns about having enough money to buy food or access to grocery stores? no    Patient questioning what type of diet to follow.  Patient has tried to follow vegan diet but states that was the no fun diet.  Patient tried keto, Mediterranean (recommended by MD) and intermittent fasting 16/8.  Patient eating 2 meals per day and snacks as trying to lose weight.  Patient finds that he may eat fast food on the weekend when involved with house projects.  Patient states his oncologist would like him to lose weight due to type of cancer.       [place] milk shake (powdered greens, fiber mix)   1:00 green pea pod crisps  caprese salad-homemade  1/2 3 meat plate from [employer]   6:30 Greek plain yogurt with blueberries + 1 T honey/monkfruit     Wedge salad -blue cheese, tomatoes, onions     Potluck with friends/family-potato salad,  "coleslaw, grilled burger-no bun, ice cream bar, pasta salad    Diet Recall  Breakfast None due to fasting    Lunch Quick oats + 1 T maple syrup with pecans; small salad with blue cheese;    Dinner Laura seared halibut with chickpea tabouli; pizza from food truck (sausage, green pepper and pepperoni)    Snacks footlong hot dog at [employer]; popcorn made with coconut milk; peanuts    Beverages Coffee 1 T half and half + 1 shot sugar free flavoring      ANTHROPOMETRICS  Height: 6'0\"  Weight: 245 lbs   BMI: 33.2 kg/m2   Weight Status:  Obesity Grade I BMI 30-34.9  IBW: 178 lbs +/-10 (137%)  Weight History:   Wt Readings from Last 10 Encounters:   07/07/21 111.1 kg (245 lb)   03/31/21 115.2 kg (253 lb 14.4 oz)   03/31/21 114.8 kg (253 lb)   01/06/21 115.2 kg (254 lb)   10/07/20 108.4 kg (239 lb)   08/04/20 107.5 kg (237 lb)   07/10/20 110.3 kg (243 lb 1.6 oz)   04/08/20 115.6 kg (254 lb 12.8 oz)   02/03/20 111.1 kg (245 lb)   01/07/20 111.2 kg (245 lb 1.6 oz)     Dosing Weight: 111.1 kg    Medications:   Reviewed    Labs:   Labs reviewed    NUTRITION FOCUSED PHYSICAL ASSESSMENT FOR DIAGNOSING MALNUTRITION:  Yes-visual    No physical related nutritional findings    ASSESSED NUTRITION NEEDS:  Estimated Energy Needs: 6229-6760 kcals (14-17 Kcal/Kg)  Justification: obese  Estimated Protein Needs:  grams protein (1.2-1.5 g pro/Kg)  Justification: preservation of lean body mass  Estimated Fluid Needs: 1121-5742  mL   Justification: maintenance    MALNUTRITION:  % Weight Loss:  None noted  % Intake:  No decreased intake noted  Subcutaneous Fat Loss:  None observed  Muscle Loss:  None observed  Fluid Retention:  None noted    Malnutrition Diagnosis: Patient does not meet two of the above criteria necessary for diagnosing malnutrition    NUTRITION DIAGNOSIS:  Food and nutrition-related knowledge deficit related to lack of prior exposure as evidenced by no previous need for food and nutrition related recommendations "     INTERVENTIONS  Provided written & verbal education:     - Reviewed nutrition needs for balanced, weight loss diet.  Instructed patient on label reading with focus on portion sizes, carbohydrate intake and added sugars.  Recommend 45 grams carbohydrate per meal + 15 grams carbohydrate as snack with <36 grams added sugar per day to be included in total carbohydrate intake.  Reviewed basics of Mediterranean diet.  Also suggest 1/2 plate filled with vegetables with reduction in red meat consumption and processed foods.  Congratulated patient on effort to make heathy food choices and weight loss with different diets.     - Reviewed foods to fight cancer with emphasis on whole grains, fruit, vegetables, legumes and lean proteins. Suggest [employer] as resource for patient.  Also recommend oncology nutrition website regarding sugar and cancer.    - Discussed cancer fatigue.  Recommend daily movement to improve fatigue.  Patient may benefit from cancer rehab and states feels like he is losing strength.  Patient states when works from home gets 0171-4834 steps per day.  Suggest 150 minutes of exercise per week if aiming to lose weight.    Provided pt with corresponding education materials/handouts on:  Academy of Nutrition and Dietetics Count Your Carbs; Academy of Nutrition and Dietetics High Protein list     Pt verbalize understanding of materials provided during consult.   Patient Understanding: good  Expected patient engagement: good      Implementation  Composition of Meals and Snacks and General/healthful diet    Goals  1.  Aim for <165 grams carbohydrate and <36 grams added sugar per day   2.  Include protein source at every meal     Follow-Up Plans: Patient to follow up in 4 weeks.  RD name and number provided     MONITORING AND EVALUATION:  -Food/beverage intake  -Weight trends    Jessica Bradshaw, RD, LD  Clinical Dietitian  [facility]  [facility]  [phone] (direct)          Again, thank you for allowing me to participate in the care of your patient.        Sincerely,        Chacorta Rossi RD, LD

## 2021-07-23 NOTE — PROGRESS NOTES
RADIATION ONCOLOGY CONSULTATION  [facility]    DATE:  July 23, 2021    PATIENT NAME: Walter Reyes  MEDICAL RECORD NUMBER: [id number]    REFERRING PHYSICIAN:  Dr. Murray    REASON FOR CONSULTATION: Consideration of  palliative radiotherapy for management of adenocarcinoma of the prostate.    Staging:  Clinical  T3b N1 M1 Seminal vesicle invasion  Woburn's score:   Caroline's score: Unknown    PSA PSA   3/26/2003 0.4   6/28/2016 2.9   8/16/2017 1.4   12/13/2018 9.1   2/15/2019 12.30    2/26/2019 8.00    3/26/2019 1.25   4/30/2019 0.69   5/29/2019 1.18   6/18/2019 0.80   7/10/2019 0.82   7/31/2019 0.95   8/21/2019 0.71   10/2/2019 0.58   1/6/2020 0.44   4/8/2020 0.41   7/13/2020 0.42   10/7/2020 0.29   1/6/2021 0.29   3/31/2021 0.44   7/7/2021 0.47         HISTORY OF PRESENT ILLNESS: Mr. Reyes is a 61-year-old man who presented with stage IV (cM1b) prostate cancer on diagnosis 2018, still castrate sensitive with high-volume metastatic disease.      Please see medical oncology notes for full oncologic history.  In brief, he was found to have an elevated PSA of 9.1 on 12/13/2018.  He was seen by an outside urologist and had an MRI of the prostate on 1/16/2019 with PI-RADS 5 lesion extending from the right peripheral zone into the neurovascular bundle, seminal vesicle and along the anterolateral right mesorectal fascia.  There was a metastatic right external iliac lymph node visible as well as a lesion in the posterior/superior right acetabulum.  CT A/P on 1/25/2019 demonstrated an incidental 4.5 x 5.8 x 5.7 cm right upper kidney mass as well as a 1.1 cm periaortic lymph node, 2 cm right external iliac lymph node, and multiple sclerotic osseous lesions suspicious for metastases.  Bone scan on 1/25/2019 demonstrated focal bony uptake in the left femoral neck, right acetabulum, right lumbosacral spine, multiple ribs, the skull, and in C7 and T1 vertebrae.  CT-guided biopsy on  "2/16/2019 with the right fifth rib demonstrated metastatic prostate carcinoma.    He was started on ADT on 2/5/2019 and underwent right robotic radical nephrectomy on 3/19/2019. 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 For his prostate cancer, he then received 6 cycles of docetaxel between 5/8/2019 to 8/21/2019.  Subsequent surveillance imaging remained stable or improved until CT CAP on 3/29/2021 demonstrated a new right sacral 8 mm bone lesion.  CT CAP on 6/30/2021 demonstrated further interval worsening of osseous metastatic disease with increase in size of several lesions and bone scan on 6/30/2021 demonstrated a new right sacral metastasis and increased uptake in the lower cervical spine, right third rib, and right L5 locations.      On last medical oncology visit on 7/7/2021 he discussed referral to radiation oncology for consideration of prostate directed radiotherapy as well as consideration of palliative radiotherapy to the cervical spine given the large size.  MRI cervical spine on 7/12/2021 demonstrated tumor involving the C7 vertebral body extending into the left pedicle and anterior epidural soft tissue.    He presents today for discussion of radiotherapy.  On interview, his primary complaint is lower urinary tract symptoms, primarily nocturia 4 times per night, incomplete emptying, frequency, and urgency.  He reports he also has fatigue, decreased energy, hot flashes, and muscle floppiness with ADT.  On Sunday, he was moving some furniture and \"pulled\" his lower back.  He says this happens to him every so often for many years when lifting heavy objects that he gets the pain across his low back.  He denies sciatic symptoms in his lower extremities, as well as pain in his cervical spine or any neurologic changes in his upper extremities including weakness, numbness, or difficulty with coordination.      PMH:   Past " Medical History:   Diagnosis Date     Cancer of kidney, right (H)      Complication of anesthesia     slow wakeup      Malignant neoplasm of prostate metastatic to bone (H) 2/14/2019     Thyroid disease        PSH:  Past Surgical History:   Procedure Laterality Date     CYSTOSCOPY      about 10 years ago     INSERT PORT VASCULAR ACCESS Right 5/2/2019    Procedure: Chest Port Placement;  Surgeon: Tate Burciaga PA-C;  Location: [facility] OR     IR CHEST PORT PLACEMENT > 5 YRS OF AGE  5/2/2019     LAPAROSCOPIC NEPHRECTOMY Right 3/19/2019    Procedure: Right laparoscopic radical nephrectomy;  Surgeon: Wesley Cleveland MD;  Location: [facility] OR       MEDICATIONS:  Current Outpatient Medications   Medication Sig Dispense Refill     amLODIPine (NORVASC) 10 MG tablet Take 1 tablet (10 mg) by mouth daily 30 tablet 0     atorvastatin (LIPITOR) 20 MG tablet Take 60 mg by mouth daily        calcium citrate-vitamin D (CITRACAL) 315-250 MG-UNIT TABS per tablet Take 650 mg by mouth 2 times daily        cetirizine (ZYRTEC) 10 MG tablet Take 10 mg by mouth as needed for allergies        co-enzyme Q-10 100 MG CAPS capsule Take 100 mg by mouth daily        cycloSPORINE (RESTASIS) 0.05 % ophthalmic emulsion Place 1 drop into both eyes 2 times daily        dexamethasone (DECADRON) 4 MG/ML injection Apply 1 mL (4 mg) topically once for 1 dose For physical therapy, iontophoresis 30 mL 0     diclofenac (VOLTAREN) 1 % topical gel Place 2 g onto the skin 4 times daily (Patient not taking: Reported on 1/6/2021) 100 g 1     doxycycline hyclate (VIBRA-TABS) 100 MG tablet Take 100 mg by mouth daily Daily every other month       fluticasone (FLONASE) 50 MCG/ACT nasal spray Spray 2 sprays in nostril daily as needed        Glucosamine-Chondroit-Vit C-Mn (GLUCOSAMINE 1500 COMPLEX) CAPS Take 1 capsule by mouth daily       levothyroxine (SYNTHROID/LEVOTHROID) 100 MCG tablet Take 100 mcg by mouth daily       MAGNESIUM PO        MEBENDAZOLE PO  Take 112 mg by mouth daily       metFORMIN (GLUCOPHAGE) 500 MG tablet Take 500 mg by mouth 2 times daily (with meals)       Multiple Vitamins-Minerals (MULTIVITAMIN ADULT EXTRA C PO) Take 1 tablet by mouth every 24 hours       Nutritional Supplements (SALMON OIL) CAPS Take 2 capsules by mouth daily        omeprazole (PRILOSEC) 20 MG DR capsule Take 20 mg by mouth daily       tamsulosin (FLOMAX) 0.4 MG capsule Take 1 capsule (0.4 mg) by mouth daily 30 capsule 3       ALLERGY:  Allergies   Allergen Reactions     Doxycycline GI Disturbance       FAMILY HISTORY:  Family History   Problem Relation Age of Onset     Diabetes Father        SOCIAL HISTORY  Social History     Tobacco Use     Smoking status: Never Smoker     Smokeless tobacco: Never Used   Substance Use Topics     Alcohol use: Not on file     Comment: wine - very rare       ECOG PERFORMANCE STATUS: 0    HISTORY OF RADIATION: Denies  HISTORY OF IBD: Denies  IMPLANTED CARDIAC DEVICE: Denies     ROS: 10 point ROS reviewed as reported on the nursing assessment note.      PHYSICAL EXAMINATION:  /78   Pulse 76   Wt 110.7 kg (244 lb 1.6 oz)   SpO2 95%   BMI 33.11 kg/m    Gen: Alert, in NAD  Eyes: EOMI, sclera anicteric  HENT      Head: NC/AT     Ears: No external auricular lesions     Nose/sinus: No rhinorrhea or epistaxis     Oral Cavity/Oropharynx: MMM, no thrush noted  Pulm: Breathing comfortably on room air, no audible wheezes or ronchi  CV: Well-perfused, no cyanosis  Abdominal: Soft, nondistended  Skin: Normal color and turgor  Neurologic/MSK: motor grossly intact, normal gait.  No pain on palpation of cervical spine  Psych: Appropriate mood and affect    LABS:  PSA as above  Testosterone 7/7/2021: 6    RADIOLOGY:  MRI CERVICAL SPINE 7/12/2021:  IMPRESSION:  1. Metastasis within the C7 vertebra with anterior epidural tumor  spread.  2. No high-grade stenoses.      BONE SCAN 6/30/2021  IMPRESSION:  1. New osseus metastasis in the right medial sacrum.    2. Increased uptake in the lower cervical spine, posterior right third  rib and right L5 posterior arch elements, compatible with worsening  osseous metastatic disease.     CT SCAN 6/30/2021  IMPRESSION:   1.  Interval worsening of osseous metastatic disease with increasing  size of some of the lesions. Please see same-day bone scan.  2.  Status post right nephrectomy. Resection bed within normal limits.  3.  There is a small nodule within the bladder lumen towards the dome  which appears new from 1/4/2021. Correlation with cystoscopy is  suggested.  4.  Stable tiny pulmonary nodules, 2 to 3 mm.  5.  No lymphadenopathy by size criteria.   6.  Other ancillary findings as above.        IMPRESSION:  High burden metastatic castrate sensitive prostate cancer with asymptomatic cervical spinal metastasis    RECOMMENDATION:   We discussed his disease course and most recent imaging, including his bone scan, CT scan, and MRI.  We discussed the potential utility of radiotherapy to various sites.    First, we discussed radiotherapy to his prostate.  We felt this would acutely worsen his symptoms and in the long-term most likely result in stable to worse lower urinary tract symptoms.  Therefore, there is no benefit to evaluation of the prostate for urinary issues.  Given his high volume of disease based on the CHAARTED definition, we discussed that he would have no demonstrable benefit in terms of progression free survival based on the results of the STAMPEDE trial. Therefore, we did not recommend radiotherapy to the prostate in the absence of likely benefit and potential for worsening his quality of life and urinary toxicity.    Second, we discussed radiotherapy to his cervical spine.  The site is currently asymptomatic, but there is some soft tissue extension near the spinal canal that could potentially cause cord compromise if it progresses further.  It is somewhat unusual that he has had progression of disease given his  persistently low PSA, but nonetheless the potentially catastrophic nature of spinal cord compression merits additional consideration for prophylaxis.  We discussed the risks and benefits of palliative radiotherapy to the cervical spine, and we ultimately recommended a 10 fraction course of external beam radiotherapy.  The patient's questions were answered to his stated satisfaction, and he agreed to proceed with this plan.  He signed consent and tomorrow will undergo CT simulation for radiotherapy planning.    Tentatively we plan to start palliative external beam radiotherapy next week, 3000 cGy in 10 fractions to the cervical spine.    The patient was seen and examined with Dr. Lizarraga.    Floyd Chong MD PGY-5  Radiation Oncology, [facility]  [phone] clinic  Pager [phone]    I saw the patient with the resident.  I agree with the resident's note and plan of care.      YVAN Lizarraga M.D.  Department of Radiation Oncology  [facility]

## 2021-07-28 ENCOUNTER — OFFICE VISIT (OUTPATIENT)
Dept: RADIATION ONCOLOGY | Facility: CLINIC | Age: 61
End: 2021-07-28
Attending: PHYSICIAN ASSISTANT
Payer: COMMERCIAL

## 2021-07-28 VITALS
DIASTOLIC BLOOD PRESSURE: 78 MMHG | SYSTOLIC BLOOD PRESSURE: 123 MMHG | OXYGEN SATURATION: 95 % | BODY MASS INDEX: 33.11 KG/M2 | WEIGHT: 244.1 LBS | HEART RATE: 76 BPM

## 2021-07-28 DIAGNOSIS — C79.51 MALIGNANT NEOPLASM OF PROSTATE METASTATIC TO BONE (H): Primary | ICD-10-CM

## 2021-07-28 DIAGNOSIS — C61 MALIGNANT NEOPLASM OF PROSTATE METASTATIC TO BONE (H): Primary | ICD-10-CM

## 2021-07-28 PROCEDURE — G0463 HOSPITAL OUTPT CLINIC VISIT: HCPCS | Performed by: RADIOLOGY

## 2021-07-28 ASSESSMENT — ENCOUNTER SYMPTOMS
DOUBLE VISION: 0
SORE THROAT: 0
NECK PAIN: 0
DEPRESSION: 0
DIAPHORESIS: 1
BRUISES/BLEEDS EASILY: 0
EYE PAIN: 0
VOMITING: 0
SEIZURES: 0
FEVER: 0
FALLS: 0
HEMATURIA: 0
BLURRED VISION: 0
BACK PAIN: 1
FREQUENCY: 0
CONSTIPATION: 0
SHORTNESS OF BREATH: 0
NAUSEA: 0
DIZZINESS: 0
NERVOUS/ANXIOUS: 0
INSOMNIA: 0
DIARRHEA: 0
CHILLS: 0
COUGH: 0
TINGLING: 0
DYSURIA: 0
HEADACHES: 0
WEIGHT LOSS: 0
BLOOD IN STOOL: 0

## 2021-07-28 NOTE — LETTER
7/28/2021         RE: Walter Reyes  [address] [facility] [address]        Dear Colleague,    Thank you for referring your patient, Walter Reyes, to the [facility] RADIATION ONCOLOGY. Please see a copy of my visit note below.      RADIATION ONCOLOGY CONSULTATION  [facility]    DATE:  July 23, 2021    PATIENT NAME: Walter Reyes  MEDICAL RECORD NUMBER: [id number]    REFERRING PHYSICIAN:  Dr. Murray    REASON FOR CONSULTATION: Consideration of  palliative radiotherapy for management of adenocarcinoma of the prostate.    Staging:  Clinical  T3b N1 M1 Seminal vesicle invasion  North Webster's score:   North Webster's score: Unknown    PSA PSA   3/26/2003 0.4   6/28/2016 2.9   8/16/2017 1.4   12/13/2018 9.1   2/15/2019 12.30    2/26/2019 8.00    3/26/2019 1.25   4/30/2019 0.69   5/29/2019 1.18   6/18/2019 0.80   7/10/2019 0.82   7/31/2019 0.95   8/21/2019 0.71   10/2/2019 0.58   1/6/2020 0.44   4/8/2020 0.41   7/13/2020 0.42   10/7/2020 0.29   1/6/2021 0.29   3/31/2021 0.44   7/7/2021 0.47         HISTORY OF PRESENT ILLNESS: Mr. Reyes is a 61-year-old man who presented with stage IV (cM1b) prostate cancer on diagnosis 2018, still castrate sensitive with high-volume metastatic disease.      Please see medical oncology notes for full oncologic history.  In brief, he was found to have an elevated PSA of 9.1 on 12/13/2018.  He was seen by an outside urologist and had an MRI of the prostate on 1/16/2019 with PI-RADS 5 lesion extending from the right peripheral zone into the neurovascular bundle, seminal vesicle and along the anterolateral right mesorectal fascia.  There was a metastatic right external iliac lymph node visible as well as a lesion in the posterior/superior right acetabulum.  CT A/P on 1/25/2019 demonstrated an incidental 4.5 x 5.8 x 5.7 cm right upper kidney mass as well as a 1.1 cm periaortic lymph node, 2 cm right external iliac  "lymph node, and multiple sclerotic osseous lesions suspicious for metastases.  Bone scan on 1/25/2019 demonstrated focal bony uptake in the left femoral neck, right acetabulum, right lumbosacral spine, multiple ribs, the skull, and in C7 and T1 vertebrae.  CT-guided biopsy on 2/16/2019 with the right fifth rib demonstrated metastatic prostate carcinoma.    He was started on ADT on 2/5/2019 and underwent right robotic radical nephrectomy on 3/19/2019. 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 For his prostate cancer, he then received 6 cycles of docetaxel between 5/8/2019 to 8/21/2019.  Subsequent surveillance imaging remained stable or improved until CT CAP on 3/29/2021 demonstrated a new right sacral 8 mm bone lesion.  CT CAP on 6/30/2021 demonstrated further interval worsening of osseous metastatic disease with increase in size of several lesions and bone scan on 6/30/2021 demonstrated a new right sacral metastasis and increased uptake in the lower cervical spine, right third rib, and right L5 locations.      On last medical oncology visit on 7/7/2021 he discussed referral to radiation oncology for consideration of prostate directed radiotherapy as well as consideration of palliative radiotherapy to the cervical spine given the large size.  MRI cervical spine on 7/12/2021 demonstrated tumor involving the C7 vertebral body extending into the left pedicle and anterior epidural soft tissue.    He presents today for discussion of radiotherapy.  On interview, his primary complaint is lower urinary tract symptoms, primarily nocturia 4 times per night, incomplete emptying, frequency, and urgency.  He reports he also has fatigue, decreased energy, hot flashes, and muscle floppiness with ADT.  On Sunday, he was moving some furniture and \"pulled\" his lower back.  He says this happens to him every so often for many years when lifting heavy " objects that he gets the pain across his low back.  He denies sciatic symptoms in his lower extremities, as well as pain in his cervical spine or any neurologic changes in his upper extremities including weakness, numbness, or difficulty with coordination.      PMH:   Past Medical History:   Diagnosis Date     Cancer of kidney, right (H)      Complication of anesthesia     slow wakeup      Malignant neoplasm of prostate metastatic to bone (H) 2/14/2019     Thyroid disease        PSH:  Past Surgical History:   Procedure Laterality Date     CYSTOSCOPY      about 10 years ago     INSERT PORT VASCULAR ACCESS Right 5/2/2019    Procedure: Chest Port Placement;  Surgeon: Tate Burciaga PA-C;  Location: [facility] OR     IR CHEST PORT PLACEMENT > 5 YRS OF AGE  5/2/2019     LAPAROSCOPIC NEPHRECTOMY Right 3/19/2019    Procedure: Right laparoscopic radical nephrectomy;  Surgeon: Wesley Cleveland MD;  Location:  OR       MEDICATIONS:  Current Outpatient Medications   Medication Sig Dispense Refill     amLODIPine (NORVASC) 10 MG tablet Take 1 tablet (10 mg) by mouth daily 30 tablet 0     atorvastatin (LIPITOR) 20 MG tablet Take 60 mg by mouth daily        calcium citrate-vitamin D (CITRACAL) 315-250 MG-UNIT TABS per tablet Take 650 mg by mouth 2 times daily        cetirizine (ZYRTEC) 10 MG tablet Take 10 mg by mouth as needed for allergies        co-enzyme Q-10 100 MG CAPS capsule Take 100 mg by mouth daily        cycloSPORINE (RESTASIS) 0.05 % ophthalmic emulsion Place 1 drop into both eyes 2 times daily        dexamethasone (DECADRON) 4 MG/ML injection Apply 1 mL (4 mg) topically once for 1 dose For physical therapy, iontophoresis 30 mL 0     diclofenac (VOLTAREN) 1 % topical gel Place 2 g onto the skin 4 times daily (Patient not taking: Reported on 1/6/2021) 100 g 1     doxycycline hyclate (VIBRA-TABS) 100 MG tablet Take 100 mg by mouth daily Daily every other month       fluticasone (FLONASE) 50 MCG/ACT nasal  spray Spray 2 sprays in nostril daily as needed        Glucosamine-Chondroit-Vit C-Mn (GLUCOSAMINE 1500 COMPLEX) CAPS Take 1 capsule by mouth daily       levothyroxine (SYNTHROID/LEVOTHROID) 100 MCG tablet Take 100 mcg by mouth daily       MAGNESIUM PO        MEBENDAZOLE PO Take 112 mg by mouth daily       metFORMIN (GLUCOPHAGE) 500 MG tablet Take 500 mg by mouth 2 times daily (with meals)       Multiple Vitamins-Minerals (MULTIVITAMIN ADULT EXTRA C PO) Take 1 tablet by mouth every 24 hours       Nutritional Supplements (SALMON OIL) CAPS Take 2 capsules by mouth daily        omeprazole (PRILOSEC) 20 MG DR capsule Take 20 mg by mouth daily       tamsulosin (FLOMAX) 0.4 MG capsule Take 1 capsule (0.4 mg) by mouth daily 30 capsule 3       ALLERGY:  Allergies   Allergen Reactions     Doxycycline GI Disturbance       FAMILY HISTORY:  Family History   Problem Relation Age of Onset     Diabetes Father        SOCIAL HISTORY  Social History     Tobacco Use     Smoking status: Never Smoker     Smokeless tobacco: Never Used   Substance Use Topics     Alcohol use: Not on file     Comment: wine - very rare       ECOG PERFORMANCE STATUS: 0    HISTORY OF RADIATION: Denies  HISTORY OF IBD: Denies  IMPLANTED CARDIAC DEVICE: Denies     ROS: 10 point ROS reviewed as reported on the nursing assessment note.      PHYSICAL EXAMINATION:  /78   Pulse 76   Wt 110.7 kg (244 lb 1.6 oz)   SpO2 95%   BMI 33.11 kg/m    Gen: Alert, in NAD  Eyes: EOMI, sclera anicteric  HENT      Head: NC/AT     Ears: No external auricular lesions     Nose/sinus: No rhinorrhea or epistaxis     Oral Cavity/Oropharynx: MMM, no thrush noted  Pulm: Breathing comfortably on room air, no audible wheezes or ronchi  CV: Well-perfused, no cyanosis  Abdominal: Soft, nondistended  Skin: Normal color and turgor  Neurologic/MSK: motor grossly intact, normal gait.  No pain on palpation of cervical spine  Psych: Appropriate mood and affect    LABS:  PSA as  above  Testosterone 7/7/2021: 6    RADIOLOGY:  MRI CERVICAL SPINE 7/12/2021:  IMPRESSION:  1. Metastasis within the C7 vertebra with anterior epidural tumor  spread.  2. No high-grade stenoses.      BONE SCAN 6/30/2021  IMPRESSION:  1. New osseus metastasis in the right medial sacrum.   2. Increased uptake in the lower cervical spine, posterior right third  rib and right L5 posterior arch elements, compatible with worsening  osseous metastatic disease.     CT SCAN 6/30/2021  IMPRESSION:   1.  Interval worsening of osseous metastatic disease with increasing  size of some of the lesions. Please see same-day bone scan.  2.  Status post right nephrectomy. Resection bed within normal limits.  3.  There is a small nodule within the bladder lumen towards the dome  which appears new from 1/4/2021. Correlation with cystoscopy is  suggested.  4.  Stable tiny pulmonary nodules, 2 to 3 mm.  5.  No lymphadenopathy by size criteria.   6.  Other ancillary findings as above.        IMPRESSION:  High burden metastatic castrate sensitive prostate cancer with asymptomatic cervical spinal metastasis    RECOMMENDATION:   We discussed his disease course and most recent imaging, including his bone scan, CT scan, and MRI.  We discussed the potential utility of radiotherapy to various sites.    First, we discussed radiotherapy to his prostate.  We felt this would acutely worsen his symptoms and in the long-term most likely result in stable to worse lower urinary tract symptoms.  Therefore, there is no benefit to evaluation of the prostate for urinary issues.  Given his high volume of disease based on the CHAARTED definition, we discussed that he would have no demonstrable benefit in terms of progression free survival based on the results of the STAMPEDE trial. Therefore, we did not recommend radiotherapy to the prostate in the absence of likely benefit and potential for worsening his quality of life and urinary toxicity.    Second, we  discussed radiotherapy to his cervical spine.  The site is currently asymptomatic, but there is some soft tissue extension near the spinal canal that could potentially cause cord compromise if it progresses further.  It is somewhat unusual that he has had progression of disease given his persistently low PSA, but nonetheless the potentially catastrophic nature of spinal cord compression merits additional consideration for prophylaxis.  We discussed the risks and benefits of palliative radiotherapy to the cervical spine, and we ultimately recommended a 10 fraction course of external beam radiotherapy.  The patient's questions were answered to his stated satisfaction, and he agreed to proceed with this plan.  He signed consent and tomorrow will undergo CT simulation for radiotherapy planning.    Tentatively we plan to start palliative external beam radiotherapy next week, 3000 cGy in 10 fractions to the cervical spine.    The patient was seen and examined with Dr. Lizarraga.    Floyd Chong MD PGY-5  Radiation Oncology, [facility]  [phone] clinic  Pager [phone]    I saw the patient with the resident.  I agree with the resident's note and plan of care.      YVAN Lizarraga M.D.  Department of Radiation Oncology  [facility]        HPI  INITIAL PATIENT ASSESSMENT    Diagnosis: Metastatic prostate cancer    Prior radiation therapy: None    Prior chemotherapy:   Protocol: see chart  Facility: [facility]  Dates: last dose 8/2019      Prior hormonal therapy:Yes: Lupron, in chart    Pain Eval:  Denies    Psychosocial  Living arrangements: wife  Fall Risk: independent  Falls Risk Screening Questions - Radiation consult    1. Have you fallen in the past one week?  No.  2. Have you felt unsteady when walking or standing in the past one week?  No  3.    referral needs: Not needed    Advanced Directive: No  Implantable Cardiac Device? No      Review of Systems    Constitutional: Positive for diaphoresis (Hot flashes almost every night). Negative for chills, fever, malaise/fatigue and weight loss.   HENT: Negative for ear pain, nosebleeds and sore throat.    Eyes: Negative for blurred vision, double vision and pain.   Respiratory: Negative for cough and shortness of breath.    Cardiovascular: Negative for chest pain and leg swelling.   Gastrointestinal: Negative for blood in stool, constipation, diarrhea, nausea and vomiting.   Genitourinary: Negative for dysuria, frequency (nocturia, on flomax), hematuria and urgency.   Musculoskeletal: Positive for back pain (Threw his back out over the weekend). Negative for falls, joint pain and neck pain.   Skin: Negative for rash.   Neurological: Negative for dizziness, tingling, seizures and headaches.   Endo/Heme/Allergies: Does not bruise/bleed easily.   Psychiatric/Behavioral: Negative for depression. The patient is not nervous/anxious and does not have insomnia.      Nurse face-to-face time: Level 4:  15 min face to face time    Again, thank you for allowing me to participate in the care of your patient.        Sincerely,        Brody Lizarraga MD

## 2021-07-28 NOTE — PROGRESS NOTES
HPI  INITIAL PATIENT ASSESSMENT    Diagnosis: Metastatic prostate cancer    Prior radiation therapy: None    Prior chemotherapy:   Protocol: see chart  Facility: [facility]  Dates: last dose 8/2019      Prior hormonal therapy:Yes: Lupron, in chart    Pain Eval:  Denies    Psychosocial  Living arrangements: wife  Fall Risk: independent  Falls Risk Screening Questions - Radiation consult    1. Have you fallen in the past one week?  No.  2. Have you felt unsteady when walking or standing in the past one week?  No  3.    referral needs: Not needed    Advanced Directive: No  Implantable Cardiac Device? No      Review of Systems   Constitutional: Positive for diaphoresis (Hot flashes almost every night). Negative for chills, fever, malaise/fatigue and weight loss.   HENT: Negative for ear pain, nosebleeds and sore throat.    Eyes: Negative for blurred vision, double vision and pain.   Respiratory: Negative for cough and shortness of breath.    Cardiovascular: Negative for chest pain and leg swelling.   Gastrointestinal: Negative for blood in stool, constipation, diarrhea, nausea and vomiting.   Genitourinary: Negative for dysuria, frequency (nocturia, on flomax), hematuria and urgency.   Musculoskeletal: Positive for back pain (Threw his back out over the weekend). Negative for falls, joint pain and neck pain.   Skin: Negative for rash.   Neurological: Negative for dizziness, tingling, seizures and headaches.   Endo/Heme/Allergies: Does not bruise/bleed easily.   Psychiatric/Behavioral: Negative for depression. The patient is not nervous/anxious and does not have insomnia.          Nurse face-to-face time: Level 4:  15 min face to face time

## 2021-07-29 ENCOUNTER — OFFICE VISIT (OUTPATIENT)
Dept: RADIATION ONCOLOGY | Facility: CLINIC | Age: 61
End: 2021-07-29
Attending: RADIOLOGY
Payer: COMMERCIAL

## 2021-07-29 DIAGNOSIS — C79.51 MALIGNANT NEOPLASM OF PROSTATE METASTATIC TO BONE (H): Primary | ICD-10-CM

## 2021-07-29 DIAGNOSIS — C61 MALIGNANT NEOPLASM OF PROSTATE METASTATIC TO BONE (H): Primary | ICD-10-CM

## 2021-07-29 PROCEDURE — 77334 RADIATION TREATMENT AID(S): CPT | Mod: 26 | Performed by: RADIOLOGY

## 2021-07-29 PROCEDURE — 77263 THER RADIOLOGY TX PLNG CPLX: CPT | Performed by: RADIOLOGY

## 2021-07-29 PROCEDURE — 77334 RADIATION TREATMENT AID(S): CPT | Performed by: RADIOLOGY

## 2021-07-29 PROCEDURE — 77290 THER RAD SIMULAJ FIELD CPLX: CPT | Mod: 26 | Performed by: RADIOLOGY

## 2021-07-29 PROCEDURE — 77290 THER RAD SIMULAJ FIELD CPLX: CPT | Performed by: RADIOLOGY

## 2021-07-29 NOTE — LETTER
7/29/2021         RE: Walter Reyes  [address] [facility] [address]        Dear Colleague,    Thank you for referring your patient, Walter Reyes, to the [facility] RADIATION ONCOLOGY. Please see a copy of my visit note below.    Simulation Note    Date: 7/29/2021     Patient: Walter Reyes    Diagnosis: Malignant neoplasm of prostate metastatic to bone (H)    Type of Simulation:  Palliation     Patient Position: Supine    Patient Immobilization: Custom:  Aquaplast Mask    Simulation Aid(s): None    Image Acquisition: Conventional CT simulation without 4D    Total Dose Planned: 3000 cGy      Dose/fraction:300 cGy    Energy of machine:  10 MV           Type of Radiotherapy Technique: 3D multiple fields with custom MLC blocking      Continuing Physcis/Dosimetry  and Dose calculations are ordered.  Simple simulations will be done prior to new start and changes in fields.  Weekly on treat visit.      YVAN Lizarraga M.D.  Department of Radiation Oncology  [facility]      Radiation Therapy Patient Education    Person involved with teaching: Patient    Patient educational needs for self management of treatment-related side effects assessment completed.  EPIC Patient Ed tab contains Patient Learning Assessment    Education Materials Given  Schedule with phone numbers    Educational Topics Discussed  Side effects expected, Pain management, Skin care, Activity, Nutrition and weight loss and When to call MD/RN    Response To Teaching  Verbalizes understanding}    Referrals sent: None    Chemotherapy?  No          Again, thank you for allowing me to participate in the care of your patient.        Sincerely,        Brody Lizarraga MD

## 2021-07-29 NOTE — PROGRESS NOTES
Simulation Note    Date: 7/29/2021     Patient: Walter Reyes    Diagnosis: Malignant neoplasm of prostate metastatic to bone (H)    Type of Simulation:  Palliation     Patient Position: Supine    Patient Immobilization: Custom:  Aquaplast Mask    Simulation Aid(s): None    Image Acquisition: Conventional CT simulation without 4D    Total Dose Planned: 3000 cGy      Dose/fraction:300 cGy    Energy of machine:  10 MV           Type of Radiotherapy Technique: 3D multiple fields with custom MLC blocking      Continuing Physcis/Dosimetry  and Dose calculations are ordered.  Simple simulations will be done prior to new start and changes in fields.  Weekly on treat visit.      YVAN Lizarraga M.D.  Department of Radiation Oncology  [facility]

## 2021-07-29 NOTE — PROGRESS NOTES
Radiation Therapy Patient Education    Person involved with teaching: Patient    Patient educational needs for self management of treatment-related side effects assessment completed.  [facility] Patient Ed tab contains Patient Learning Assessment    Education Materials Given  Schedule with phone numbers    Educational Topics Discussed  Side effects expected, Pain management, Skin care, Activity, Nutrition and weight loss and When to call MD/RN    Response To Teaching  Verbalizes understanding}    Referrals sent: None    Chemotherapy?  No

## 2021-08-04 ENCOUNTER — APPOINTMENT (OUTPATIENT)
Dept: RADIATION ONCOLOGY | Facility: CLINIC | Age: 61
End: 2021-08-04
Attending: RADIOLOGY
Payer: COMMERCIAL

## 2021-08-04 PROCEDURE — 77280 THER RAD SIMULAJ FIELD SMPL: CPT | Performed by: RADIOLOGY

## 2021-08-04 PROCEDURE — 77280 THER RAD SIMULAJ FIELD SMPL: CPT | Mod: 26 | Performed by: RADIOLOGY

## 2021-08-05 ENCOUNTER — OFFICE VISIT (OUTPATIENT)
Dept: RADIATION ONCOLOGY | Facility: CLINIC | Age: 61
End: 2021-08-05
Attending: RADIOLOGY
Payer: COMMERCIAL

## 2021-08-05 VITALS
HEART RATE: 111 BPM | DIASTOLIC BLOOD PRESSURE: 77 MMHG | BODY MASS INDEX: 33.15 KG/M2 | SYSTOLIC BLOOD PRESSURE: 115 MMHG | WEIGHT: 244.4 LBS

## 2021-08-05 DIAGNOSIS — C61 MALIGNANT NEOPLASM OF PROSTATE METASTATIC TO BONE (H): Primary | ICD-10-CM

## 2021-08-05 DIAGNOSIS — C79.51 MALIGNANT NEOPLASM OF PROSTATE METASTATIC TO BONE (H): Primary | ICD-10-CM

## 2021-08-05 PROCEDURE — 77014 PR CT GUIDE FOR PLACEMENT RADIATION THERAPY FIELDS: CPT | Mod: 26 | Performed by: RADIOLOGY

## 2021-08-05 PROCEDURE — 77412 RADIATION TX DELIVERY LVL 3: CPT | Performed by: RADIOLOGY

## 2021-08-05 PROCEDURE — 77387 GUIDANCE FOR RADJ TX DLVR: CPT | Performed by: RADIOLOGY

## 2021-08-05 NOTE — LETTER
8/5/2021         RE: Walter Reeys  [address] [facility] [address]        Dear Colleague,    Thank you for referring your patient, Walter Reyes, to the [facility] RADIATION ONCOLOGY. Please see a copy of my visit note below.    WEEKLY MANAGEMENT NOTE  Radiation Oncology          Patient Name: Walter Reyes  MRN: [id number]    Treatment Site:  Current Dose: 300/3000 cGy Fractions: 1/10         DAILY DOSE:     300  cGy/ day,  5 times/week        DISEASE UNDER TREATMENT: Metastatic prostate cancer    SUBJECTIVE:    CTC V5.0 Toxicity Criteria  Fatigue: Grade 0: No toxicity  Skin: Grade 0: No toxicity  Comment:    GI:  Nausea: Grade 0: No toxicity  Mucositis: Grade 0: No toxicity  Comment:      OBJECTIVE:/77   Pulse 111   Wt 110.9 kg (244 lb 6.4 oz)   BMI 33.15 kg/m    Wt Readings from Last 2 Encounters:   08/05/21 110.9 kg (244 lb 6.4 oz)   07/28/21 110.7 kg (244 lb 1.6 oz)        IMPRESSION: The patient is tolerating the treatment.  The patient set up, dose, and portal imagings were reviewed.      PLAN: Continue radiotherapy. Esophagitis reviewed    PAIN MANAGEMENT PLAN: The patient will continue current pain medication    YVAN Lizarraga M.D.  Department of Radiation Oncology  [facility]

## 2021-08-05 NOTE — PROGRESS NOTES
WEEKLY MANAGEMENT NOTE  Radiation Oncology          Patient Name: Walter Reyes  MRN: [id number]    Treatment Site: C7 Current Dose: 300/3000 cGy Fractions: 1/10         DAILY DOSE:     300  cGy/ day,  5 times/week        DISEASE UNDER TREATMENT: Metastatic prostate cancer    SUBJECTIVE:    CTC V5.0 Toxicity Criteria  Fatigue: Grade 0: No toxicity  Skin: Grade 0: No toxicity  Comment:    GI:  Nausea: Grade 0: No toxicity  Mucositis: Grade 0: No toxicity  Comment:      OBJECTIVE:/77   Pulse 111   Wt 110.9 kg (244 lb 6.4 oz)   BMI 33.15 kg/m    Wt Readings from Last 2 Encounters:   08/05/21 110.9 kg (244 lb 6.4 oz)   07/28/21 110.7 kg (244 lb 1.6 oz)        IMPRESSION: The patient is tolerating the treatment.  The patient set up, dose, and portal imagings were reviewed.      PLAN: Continue radiotherapy. Esophagitis reviewed    PAIN MANAGEMENT PLAN: The patient will continue current pain medication    YVAN Lizarraga M.D.  Department of Radiation Oncology  [facility]

## 2021-08-06 ENCOUNTER — APPOINTMENT (OUTPATIENT)
Dept: RADIATION ONCOLOGY | Facility: CLINIC | Age: 61
End: 2021-08-06
Attending: RADIOLOGY
Payer: COMMERCIAL

## 2021-08-06 PROCEDURE — 77412 RADIATION TX DELIVERY LVL 3: CPT | Performed by: RADIOLOGY

## 2021-08-06 PROCEDURE — 77387 GUIDANCE FOR RADJ TX DLVR: CPT | Performed by: RADIOLOGY

## 2021-08-06 PROCEDURE — 77014 PR CT GUIDE FOR PLACEMENT RADIATION THERAPY FIELDS: CPT | Mod: 26 | Performed by: RADIOLOGY

## 2021-08-09 ENCOUNTER — APPOINTMENT (OUTPATIENT)
Dept: RADIATION ONCOLOGY | Facility: CLINIC | Age: 61
End: 2021-08-09
Attending: RADIOLOGY
Payer: COMMERCIAL

## 2021-08-09 PROCEDURE — 77412 RADIATION TX DELIVERY LVL 3: CPT | Performed by: RADIOLOGY

## 2021-08-09 PROCEDURE — 77014 PR CT GUIDE FOR PLACEMENT RADIATION THERAPY FIELDS: CPT | Mod: 26 | Performed by: RADIOLOGY

## 2021-08-09 PROCEDURE — 77387 GUIDANCE FOR RADJ TX DLVR: CPT | Performed by: RADIOLOGY

## 2021-08-10 ENCOUNTER — APPOINTMENT (OUTPATIENT)
Dept: RADIATION ONCOLOGY | Facility: CLINIC | Age: 61
End: 2021-08-10
Attending: RADIOLOGY
Payer: COMMERCIAL

## 2021-08-10 PROCEDURE — 77412 RADIATION TX DELIVERY LVL 3: CPT | Performed by: RADIOLOGY

## 2021-08-10 PROCEDURE — 77387 GUIDANCE FOR RADJ TX DLVR: CPT | Performed by: RADIOLOGY

## 2021-08-11 ENCOUNTER — APPOINTMENT (OUTPATIENT)
Dept: RADIATION ONCOLOGY | Facility: CLINIC | Age: 61
End: 2021-08-11
Attending: RADIOLOGY
Payer: COMMERCIAL

## 2021-08-11 PROCEDURE — 77387 GUIDANCE FOR RADJ TX DLVR: CPT | Performed by: RADIOLOGY

## 2021-08-11 PROCEDURE — 77412 RADIATION TX DELIVERY LVL 3: CPT | Performed by: RADIOLOGY

## 2021-08-11 PROCEDURE — 77336 RADIATION PHYSICS CONSULT: CPT | Performed by: RADIOLOGY

## 2021-08-12 ENCOUNTER — APPOINTMENT (OUTPATIENT)
Dept: RADIATION ONCOLOGY | Facility: CLINIC | Age: 61
End: 2021-08-12
Attending: RADIOLOGY
Payer: COMMERCIAL

## 2021-08-12 VITALS
DIASTOLIC BLOOD PRESSURE: 83 MMHG | HEART RATE: 89 BPM | WEIGHT: 241.8 LBS | SYSTOLIC BLOOD PRESSURE: 123 MMHG | BODY MASS INDEX: 32.79 KG/M2

## 2021-08-12 DIAGNOSIS — C79.51 MALIGNANT NEOPLASM OF PROSTATE METASTATIC TO BONE (H): Primary | ICD-10-CM

## 2021-08-12 DIAGNOSIS — C61 MALIGNANT NEOPLASM OF PROSTATE METASTATIC TO BONE (H): Primary | ICD-10-CM

## 2021-08-12 PROCEDURE — 77412 RADIATION TX DELIVERY LVL 3: CPT | Performed by: RADIOLOGY

## 2021-08-12 PROCEDURE — 77014 PR CT GUIDE FOR PLACEMENT RADIATION THERAPY FIELDS: CPT | Mod: 26 | Performed by: RADIOLOGY

## 2021-08-12 PROCEDURE — 77387 GUIDANCE FOR RADJ TX DLVR: CPT | Performed by: RADIOLOGY

## 2021-08-12 NOTE — PROGRESS NOTES
WEEKLY MANAGEMENT NOTE  Radiation Oncology          Patient Name: Walter Reyes  MRN: [id number]    Treatment Site: C7 Current Dose: 1800/3000 cGy Fractions: 1/10         DAILY DOSE:     300  cGy/ day,  5 times/week        DISEASE UNDER TREATMENT: Metastatic prostate cancer    SUBJECTIVE: He is tolerating treatment for metastatic cancer thus far. No new complaint this week.    CTC V5.0 Toxicity Criteria  Fatigue: Grade 0: No toxicity  Skin: Grade 1: Faint erythema or dry desquamation  Comment:    GI:  Nausea: Grade 0: No toxicity  Mucositis: Grade 0: No toxicity  Comment:      OBJECTIVE:Wt 109.7 kg (241 lb 12.8 oz)   BMI 32.79 kg/m    Wt Readings from Last 2 Encounters:   08/12/21 109.7 kg (241 lb 12.8 oz)   08/05/21 110.9 kg (244 lb 6.4 oz)        IMPRESSION: The patient is tolerating the treatment.  The patient set up, dose, and portal imagings were reviewed.      PLAN: Continue radiotherapy.     PAIN MANAGEMENT PLAN: The patient will continue current pain medication    YVAN Lizarraga M.D.  Department of Radiation Oncology  [facility]

## 2021-08-12 NOTE — LETTER
8/12/2021         RE: Walter Reyes  [address] [facility] [address]        Dear Colleague,    Thank you for referring your patient, Walter Reyes, to the [facility] RADIATION ONCOLOGY. Please see a copy of my visit note below.    WEEKLY MANAGEMENT NOTE  Radiation Oncology          Patient Name: Walter Reyes  MRN: [id number]    Treatment Site:  Current Dose: 1800/3000 cGy Fractions: 1/10         DAILY DOSE:     300  cGy/ day,  5 times/week        DISEASE UNDER TREATMENT: Metastatic prostate cancer    SUBJECTIVE: He is tolerating treatment for metastatic cancer thus far. No new complaint this week.    CTC V5.0 Toxicity Criteria  Fatigue: Grade 0: No toxicity  Skin: Grade 1: Faint erythema or dry desquamation  Comment:    GI:  Nausea: Grade 0: No toxicity  Mucositis: Grade 0: No toxicity  Comment:      OBJECTIVE:Wt 109.7 kg (241 lb 12.8 oz)   BMI 32.79 kg/m    Wt Readings from Last 2 Encounters:   08/12/21 109.7 kg (241 lb 12.8 oz)   08/05/21 110.9 kg (244 lb 6.4 oz)        IMPRESSION: The patient is tolerating the treatment.  The patient set up, dose, and portal imagings were reviewed.      PLAN: Continue radiotherapy.     PAIN MANAGEMENT PLAN: The patient will continue current pain medication    YVAN Lizarraga M.D.  Department of Radiation Oncology  [facility]

## 2021-08-13 ENCOUNTER — APPOINTMENT (OUTPATIENT)
Dept: RADIATION ONCOLOGY | Facility: CLINIC | Age: 61
End: 2021-08-13
Attending: RADIOLOGY
Payer: COMMERCIAL

## 2021-08-13 PROCEDURE — 77014 PR CT GUIDE FOR PLACEMENT RADIATION THERAPY FIELDS: CPT | Mod: 26 | Performed by: RADIOLOGY

## 2021-08-13 PROCEDURE — 77412 RADIATION TX DELIVERY LVL 3: CPT | Performed by: RADIOLOGY

## 2021-08-13 PROCEDURE — 77387 GUIDANCE FOR RADJ TX DLVR: CPT | Performed by: RADIOLOGY

## 2021-08-16 ENCOUNTER — APPOINTMENT (OUTPATIENT)
Dept: LAB | Facility: CLINIC | Age: 61
End: 2021-08-16
Attending: INTERNAL MEDICINE
Payer: COMMERCIAL

## 2021-08-16 ENCOUNTER — APPOINTMENT (OUTPATIENT)
Dept: RADIATION ONCOLOGY | Facility: CLINIC | Age: 61
End: 2021-08-16
Attending: RADIOLOGY
Payer: COMMERCIAL

## 2021-08-16 ENCOUNTER — ONCOLOGY VISIT (OUTPATIENT)
Dept: ONCOLOGY | Facility: CLINIC | Age: 61
End: 2021-08-16
Attending: INTERNAL MEDICINE
Payer: COMMERCIAL

## 2021-08-16 VITALS
BODY MASS INDEX: 33.08 KG/M2 | DIASTOLIC BLOOD PRESSURE: 77 MMHG | TEMPERATURE: 98.2 F | OXYGEN SATURATION: 98 % | RESPIRATION RATE: 20 BRPM | WEIGHT: 243.9 LBS | SYSTOLIC BLOOD PRESSURE: 126 MMHG | HEART RATE: 62 BPM

## 2021-08-16 DIAGNOSIS — C79.51 MALIGNANT NEOPLASM OF PROSTATE METASTATIC TO BONE (H): ICD-10-CM

## 2021-08-16 DIAGNOSIS — C61 MALIGNANT NEOPLASM OF PROSTATE METASTATIC TO BONE (H): ICD-10-CM

## 2021-08-16 LAB
HOLD SPECIMEN: NORMAL
HOLD SPECIMEN: NORMAL

## 2021-08-16 PROCEDURE — G0463 HOSPITAL OUTPT CLINIC VISIT: HCPCS

## 2021-08-16 PROCEDURE — 77387 GUIDANCE FOR RADJ TX DLVR: CPT | Performed by: RADIOLOGY

## 2021-08-16 PROCEDURE — 77014 PR CT GUIDE FOR PLACEMENT RADIATION THERAPY FIELDS: CPT | Mod: 26 | Performed by: RADIOLOGY

## 2021-08-16 PROCEDURE — 77412 RADIATION TX DELIVERY LVL 3: CPT | Performed by: RADIOLOGY

## 2021-08-16 PROCEDURE — 99214 OFFICE O/P EST MOD 30 MIN: CPT | Performed by: INTERNAL MEDICINE

## 2021-08-16 ASSESSMENT — PAIN SCALES - GENERAL: PAINLEVEL: NO PAIN (0)

## 2021-08-16 NOTE — NURSING NOTE
Chief Complaint   Patient presents with     Oncology Clinic Visit     [facility] RETURN - RENAL CELL CARCINOMA     Blood Draw     No lab orders, research labs, purple and green gel collected.

## 2021-08-16 NOTE — NURSING NOTE
Oncology Rooming Note    August 16, 2021 4:37 PM   Walter Reyes is a 61 year old male who presents for:    Chief Complaint   Patient presents with     Oncology Clinic Visit     [facility] RETURN - RENAL CELL CARCINOMA     Blood Draw     No lab orders, research labs, purple and green gel collected.      Initial Vitals: /77   Pulse 62   Temp 98.2  F (36.8  C) (Oral)   Resp 20   Wt 110.6 kg (243 lb 14.4 oz)   SpO2 98%   BMI 33.08 kg/m   Estimated body mass index is 33.08 kg/m  as calculated from the following:    Height as of 8/4/20: 1.829 m (6').    Weight as of this encounter: 110.6 kg (243 lb 14.4 oz). Body surface area is 2.37 meters squared.  No Pain (0) Comment: Data Unavailable   No LMP for male patient.  Allergies reviewed: Yes  Medications reviewed: Yes    Medications: Medication refills not needed today.  Pharmacy name entered into EPIC:    [facility] [place] - [place], [place] - [address] [id number] - [place], [place] - [address]    Clinical concerns: None.       Nohemi Mcleod MA

## 2021-08-16 NOTE — LETTER
"    8/16/2021         RE: Walter Reyes  [address] [facility] [address]        Dear Colleague,    Thank you for referring your patient, Walter Reyes, to the [facility]. Please see a copy of my visit note below.    MEDICAL ONCOLOGY FOLLOW-UP NOTE  Aug 16, 2021    REASON FOR VISIT: Follow-up for metastatic hormone-sensitive prostate cancer, currently on ADT alone.    HISTORY OF PRESENT ILLNESS: Mr. 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 His oncologic history is detailed below.         ONCOLOGIC HISTORY:  1. 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 Naylor at [facility]. Prior PSA were 1.4 on 8/16/17, 2.4 on 6/28/16, 2.9 on 6/28/16, and as low as 0.4 on 3/26/2003.   - 1/08/2019: Consultation with Sarah Wilson CNP in Urology clinic. Repeat PSA 9.6.  - 1/16/2019: MRI prostate with contrast - \"This examination is characterized as PIRADS 5- very high probability. Clinically significant cancer is highly likely to be present. There is a large, invasive mass arising from the right peripheral zone and extending into the neurovascular bundle, seminal vesicle, and along the anterolateral right mesorectal fascia. Metastatic right external iliac lymph node. Metastatic lesion in the posterior/superior right acetabulum.\"  - 1/25/2019: CT abdomen and pelvis with IV contrast - \"1. Heterogeneous enhancing mass posteriorly in the upper pole of the right kidney measures 4.5 x 5.8 x 5.7 cm (AP by transverse by craniocaudal). It has a small nodular component extending posterior medially which abuts the right psoas muscle. This nodular extension " "measures 2.1 x 2.1 cm. Minimal stranding about the mass. No definite thrombus within the right renal vein. This renal mass is compatible with a renal cell carcinoma. A paraaortic lymph node situated immediately posterior to the left renal vein measures 1.1 cm in short axis, suspicious for metastasis. 2. A 2 cm right external iliac lymph node is also suspicious for metastases. 3. Multiple sclerotic osseous lesions suspicious for metastases. These include 0.8 and 0.6 cm sclerotic lesions laterally in the right iliac wing (images 53 and 62 respectively). Ill-defined groundglass density laterally in the right acetabulum measuring 1.7 x 1.2 cm corresponds with the lesion identified on MRI. A 0.4 cm groundglass density in the left acetabulum. Sclerotic lesion in the left femoral neck measures 0.9 x 1.6 cm (image 81). Sclerotic metastases would be more compatible with prostate metastases. 4. A 3 subcentimeter hepatic lesions are indeterminate. Metastases would be a consideration. These can be further characterized with liver MRI.\"  - 1/25/2019: NM bone scan - \"There is focal bony uptake in the left femoral neck, right acetabulum, right of midline at the S1 or L5 level of the spine, within multiple bilateral ribs, in the C7 or T1 level of the spine, and anteriorly within the skull. Findings are suspicious for metastases.\"  - 1/31/19: CT chest with contrast - \" No suspicious nodules in the chest.  Stable appearance of a 5.8 cm right renal mass concerning for renal carcinoma until proven otherwise.  Stable indeterminant subcentimeter hypodensities in the liver.  Multifocal osteoblastic metastasis including 2.5 cm lesion in the right fifth rib posteriorly and a 1.6 cm lesion in the right third rib posteriorly. No lytic lesions identified.\"  - 2/6/19: CT-guided right sclerotic fifth rib lesion biopsy - \"Metastatic carcinoma, consistent with prostate primary.  Immunohistochemical stains performed show the metastatic carcinoma " "stains positive for NKX3.1 (prostate marker), negative for STACIE 3 and PAX8, which supports the above diagnosis.\"  - 2/15/19: Started Casodex 50mg every day and consented for the biobanking protocol. 3/18/19 - stopped Casodex.  - 2/19/19: Case discussed in tumor board - recommendation for nephectomy for suspected malignant right renal mass.   - 2/26/19: Started Lupron 22.5mg every 3 months.   - 5/8/19: Started Docetaxel 75mg/m2 IV every 3 weeks. 06/18/19 - cycle 3, 7/10/19 - cycle 4, 07/31/19 - cycle 5, 08/21/19 - cycle 6.   - 10/2/19: Restaging CT CAP and NM Bone Scan showed improved osseous disease, no evidence of recurrent or new metastatic disease.  - 1/5/20: Restaging CT CAP and NM Bone Scan showed stable osseous disease, no evidence of recurrent or new metastatic disease.  - 4/6/20: NM bone scan with slightly improved uptake in known skeletal mets. CT C/A/P with contrast with stable osseous mets, no yusuf enlargement, no new visceral mets etc.  - 04/08/20: Zometa every 3 months.  - 10/5/20: CT C/A/P with contrast and NM bone scan - SD.   - 1/4/21: CT C/A/P with contrast and NM bone scan - SD but slight increase in right 5th rib tracer uptake and in posterior L5 sclerosis.   - 03/29/21: CT C/A/P with contrast - single new right sacral 8mm bone lesion (suspicious), other bone mets stable. No visceral/yusuf disease. Nephrectomy site without recurrence. NM bone scan - SD but \"increased uptake associated with the prior lesions of the lower  cervical spine, posterior right fourth rib and right L5 posterior arch elements. Otherwise unchanged uptake associated with the proximal left femur and left anterior fourth rib. Similar uptake of the left halux, possibly secondary to degenerative osteoarthritis or gout.\"    1/6/21  PSA = 0.29  3/31/21  PSA = 0.44  7/7/21  PSA = 0.47    Currently receiving 10 fx of RT at C7  Tolerating well.   Dr Lizarraga decided to not radiate the prostate  He is tolerating the RT well.  He has no " "radicular symptoms.        2. Stage III ([id number]), grade 3 of 4, clear-cell RCC of right kidney:  - Incidentally diagnosed as above.   - Underwent curative-intent robotic right radical nephrectomy with Dr. Wesley Cleveland on 3/19/19. No tumor spillage per op report.   - Path showed: \"Histologic Type: Clear cell renal cell carcinoma; Sarcomatoid Features: Not identified; Histologic Grade: Nucleolar grade 3 (WHO/ISUP). Extent Tumor Size: 4.3 cm. Microscopic Tumor Extension: Tumor extension into renal sinus (in vascular structures). Margins: Negative. Tumor Necrosis: Present; focal. Lymph-Vascular Invasion: Not identified.  Pathologic Staging (pTNM) Primary Tumor (pT): pT3a: Tumor extends into renal vein branches/renal sinus. Regional Lymph Nodes (pN): pNX. Number of Lymph Nodes Examined: 0 Distant Metastasis (pM): pM N/A.\"  - Restaging scans as above.    PAST MEDICAL HISTORY:  Past Medical History:   Diagnosis Date     Cancer of kidney, right (H)      Complication of anesthesia     slow wakeup      Malignant neoplasm of prostate metastatic to bone (H) 2/14/2019     Thyroid disease      PAST SURGICAL HISTORY:   Past Surgical History:   Procedure Laterality Date     CYSTOSCOPY      about 10 years ago     INSERT PORT VASCULAR ACCESS Right 5/2/2019    Procedure: Chest Port Placement;  Surgeon: Tate Burciaga PA-C;  Location: [facility] OR     IR CHEST PORT PLACEMENT > 5 YRS OF AGE  5/2/2019     LAPAROSCOPIC NEPHRECTOMY Right 3/19/2019    Procedure: Right laparoscopic radical nephrectomy;  Surgeon: Wesley Cleveland MD;  Location:  OR      SOCIAL HISTORY:   Social History     Tobacco Use     Smoking status: Never Smoker     Smokeless tobacco: Never Used   Substance Use Topics     Alcohol use: None     Comment: wine - very rare     Drug use: No     FAMILY HISTORY:   Family History   Problem Relation Age of Onset     Diabetes Father      ALLERGIES:   Allergies   Allergen Reactions     Doxycycline GI Disturbance "     CURRENT MEDICATIONS:   Current Outpatient Medications:      amLODIPine (NORVASC) 10 MG tablet, Take 1 tablet (10 mg) by mouth daily, Disp: 30 tablet, Rfl: 0     atorvastatin (LIPITOR) 20 MG tablet, Take 60 mg by mouth daily , Disp: , Rfl:      calcium citrate-vitamin D (CITRACAL) 315-250 MG-UNIT TABS per tablet, Take 650 mg by mouth 2 times daily , Disp: , Rfl:      cetirizine (ZYRTEC) 10 MG tablet, Take 10 mg by mouth as needed for allergies , Disp: , Rfl:      cycloSPORINE (RESTASIS) 0.05 % ophthalmic emulsion, Place 1 drop into both eyes 2 times daily , Disp: , Rfl:      doxycycline hyclate (VIBRA-TABS) 100 MG tablet, Take 100 mg by mouth daily Daily every other month, Disp: , Rfl:      fluticasone (FLONASE) 50 MCG/ACT nasal spray, Spray 2 sprays in nostril daily as needed , Disp: , Rfl:      Glucosamine-Chondroit-Vit C-Mn (GLUCOSAMINE 1500 COMPLEX) CAPS, Take 1 capsule by mouth daily, Disp: , Rfl:      levothyroxine (SYNTHROID/LEVOTHROID) 100 MCG tablet, Take 100 mcg by mouth daily, Disp: , Rfl:      MAGNESIUM PO, , Disp: , Rfl:      MEBENDAZOLE PO, Take 112 mg by mouth daily, Disp: , Rfl:      metFORMIN (GLUCOPHAGE) 500 MG tablet, Take 500 mg by mouth 2 times daily (with meals), Disp: , Rfl:      Multiple Vitamins-Minerals (MULTIVITAMIN ADULT EXTRA C PO), Take 1 tablet by mouth every 24 hours, Disp: , Rfl:      Nutritional Supplements (SALMON OIL) CAPS, Take 2 capsules by mouth daily , Disp: , Rfl:      omeprazole (PRILOSEC) 20 MG DR capsule, Take 20 mg by mouth daily, Disp: , Rfl:      tamsulosin (FLOMAX) 0.4 MG capsule, Take 1 capsule (0.4 mg) by mouth daily, Disp: 30 capsule, Rfl: 3    PHYSICAL EXAMINATION:  VITALS: /77   Pulse 62   Temp 98.2  F (36.8  C) (Oral)   Resp 20   Wt 110.6 kg (243 lb 14.4 oz)   SpO2 98%   BMI 33.08 kg/m       ECOG PS 1.    General: Patient appears well in no acute distress.   Skin: No visualized rash or lesions on visualized skin  Eyes: EOMI, no erythema, sclera  "icterus or discharge noted  Resp: Appears to be breathing comfortably without accessory muscle usage, speaking in full sentences, no cough  MSK: Appears to have normal range of motion based on visualized movements  Neurologic: No apparent tremors, facial movements symmetric  Psych: affect normal, alert and oriented    LABORATORY DATA/IMAGING:     I personally reviewed labs and imaging today     ASSESSMENT & PLAN: Mr. Reyes is a delightful 61 year old gentleman with recently diagnosed metastatic castration sensitive prostate adenocarcinoma as well as an incidentally diagnosed stage III clear-cell renal cell carcinoma of the right kidney (s/p radical R nephrectomy 3/19/19), who is here for a follow-up visit after completion of docetaxel for prostate cancer.     1. Metastatic prostate cancer, with involvement of the bones and RPLN:   - Patient met the criteria for CHAARTED \"high-volume\" metastatic hormone-sensitive prostate cancer. He opted for docetaxel in combination with ADT (per JOSEFA, [id number], KARLA). Tolerated 6 cycles of docetaxel fairly well (5/8/19 through 8/21/19), with the exception of mild fatigue and mild neuropathy. Now on ADT and tolerating fairly well.  - Reviewed restaging CT CAP and NM Bone Scan (acutal images) from 6/30/21. Does note some progression of bone lesions, though with frequent bone scans, this can show some more variance than actual disease progression. Dr. Winters joined part of the visit and discussion. Since he has not had prostate directed therapies, will refer to RT for potential prostate RT as this could help LUT symptoms as well. He does have a large lesion in the C spine, though he is asymptomatic. Will get MRI of c spine to assess and then could have RT to this lesion as well.   - Continue leuprolide 22.5mg every 3 months,  -Scans in November  -Will reach out to [facility] to help obtain Caris testing on original biopsy   -could consider Guardant 360 after RT if the PSA " rises.   -continue zometa until April of 2022 ( two full years of therapy)    2. Bone metastases:   - Noted to have osseous metastatic disease at the time of diagnosis. Worsening mets seen on imaging today   - Encouraged to continue calcium and vitamin D supplementation.  - Will continue Zometa 4mg IV every 3 months, will get dose today.Reassess for continued therapy in April 2022.     3. Stage 3, UISS-high risk ccRCC:  - pproximately 40-50% risk of recurrence after definitive surgery.   - no clear evidence of residual disease at this time; the retroperitoneal lymphadenopathy is more consistent with metastatic prostate cancer   - Continue active surveillance with self-reporting for signs/symptoms of recurrence (this was previously explained in detail) and periodic H&P and scans.     4. COVID - recovered but still needs to be vaccinated! Encouraged.     Jj Winters MD  [facility]  [address] [place] [id number] [phone]        Again, thank you for allowing me to participate in the care of your patient.        Sincerely,        Jj Winters MD

## 2021-08-16 NOTE — PROGRESS NOTES
"MEDICAL ONCOLOGY FOLLOW-UP NOTE  Aug 16, 2021    REASON FOR VISIT: Follow-up for metastatic hormone-sensitive prostate cancer, currently on ADT alone.    HISTORY OF PRESENT ILLNESS: Mr. 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 His oncologic history is detailed below.         ONCOLOGIC HISTORY:  1. 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 Naylor at [facility]. Prior PSA were 1.4 on 8/16/17, 2.4 on 6/28/16, 2.9 on 6/28/16, and as low as 0.4 on 3/26/2003.   - 1/08/2019: Consultation with Sarah Wilson CNP in Urology clinic. Repeat PSA 9.6.  - 1/16/2019: MRI prostate with contrast - \"This examination is characterized as PIRADS 5- very high probability. Clinically significant cancer is highly likely to be present. There is a large, invasive mass arising from the right peripheral zone and extending into the neurovascular bundle, seminal vesicle, and along the anterolateral right mesorectal fascia. Metastatic right external iliac lymph node. Metastatic lesion in the posterior/superior right acetabulum.\"  - 1/25/2019: CT abdomen and pelvis with IV contrast - \"1. Heterogeneous enhancing mass posteriorly in the upper pole of the right kidney measures 4.5 x 5.8 x 5.7 cm (AP by transverse by craniocaudal). It has a small nodular component extending posterior medially which abuts the right psoas muscle. This nodular extension measures 2.1 x 2.1 cm. Minimal stranding about the mass. No definite thrombus within the right renal vein. This renal mass is compatible with a renal cell carcinoma. A paraaortic lymph node situated immediately posterior to the left renal vein measures 1.1 cm in short axis, " "suspicious for metastasis. 2. A 2 cm right external iliac lymph node is also suspicious for metastases. 3. Multiple sclerotic osseous lesions suspicious for metastases. These include 0.8 and 0.6 cm sclerotic lesions laterally in the right iliac wing (images 53 and 62 respectively). Ill-defined groundglass density laterally in the right acetabulum measuring 1.7 x 1.2 cm corresponds with the lesion identified on MRI. A 0.4 cm groundglass density in the left acetabulum. Sclerotic lesion in the left femoral neck measures 0.9 x 1.6 cm (image 81). Sclerotic metastases would be more compatible with prostate metastases. 4. A 3 subcentimeter hepatic lesions are indeterminate. Metastases would be a consideration. These can be further characterized with liver MRI.\"  - 1/25/2019: NM bone scan - \"There is focal bony uptake in the left femoral neck, right acetabulum, right of midline at the S1 or L5 level of the spine, within multiple bilateral ribs, in the C7 or T1 level of the spine, and anteriorly within the skull. Findings are suspicious for metastases.\"  - 1/31/19: CT chest with contrast - \" No suspicious nodules in the chest.  Stable appearance of a 5.8 cm right renal mass concerning for renal carcinoma until proven otherwise.  Stable indeterminant subcentimeter hypodensities in the liver.  Multifocal osteoblastic metastasis including 2.5 cm lesion in the right fifth rib posteriorly and a 1.6 cm lesion in the right third rib posteriorly. No lytic lesions identified.\"  - 2/6/19: CT-guided right sclerotic fifth rib lesion biopsy - \"Metastatic carcinoma, consistent with prostate primary.  Immunohistochemical stains performed show the metastatic carcinoma stains positive for NKX3.1 (prostate marker), negative for STACIE 3 and PAX8, which supports the above diagnosis.\"  - 2/15/19: Started Casodex 50mg every day and consented for the biobanking protocol. 3/18/19 - stopped Casodex.  - 2/19/19: Case discussed in tumor board - " "recommendation for nephectomy for suspected malignant right renal mass.   - 2/26/19: Started Lupron 22.5mg every 3 months.   - 5/8/19: Started Docetaxel 75mg/m2 IV every 3 weeks. 06/18/19 - cycle 3, 7/10/19 - cycle 4, 07/31/19 - cycle 5, 08/21/19 - cycle 6.   - 10/2/19: Restaging CT CAP and NM Bone Scan showed improved osseous disease, no evidence of recurrent or new metastatic disease.  - 1/5/20: Restaging CT CAP and NM Bone Scan showed stable osseous disease, no evidence of recurrent or new metastatic disease.  - 4/6/20: NM bone scan with slightly improved uptake in known skeletal mets. CT C/A/P with contrast with stable osseous mets, no yusuf enlargement, no new visceral mets etc.  - 04/08/20: Zometa every 3 months.  - 10/5/20: CT C/A/P with contrast and NM bone scan - SD.   - 1/4/21: CT C/A/P with contrast and NM bone scan - SD but slight increase in right 5th rib tracer uptake and in posterior L5 sclerosis.   - 03/29/21: CT C/A/P with contrast - single new right sacral 8mm bone lesion (suspicious), other bone mets stable. No visceral/yusuf disease. Nephrectomy site without recurrence. NM bone scan - SD but \"increased uptake associated with the prior lesions of the lower  cervical spine, posterior right fourth rib and right L5 posterior arch elements. Otherwise unchanged uptake associated with the proximal left femur and left anterior fourth rib. Similar uptake of the left halux, possibly secondary to degenerative osteoarthritis or gout.\"    1/6/21  PSA = 0.29  3/31/21  PSA = 0.44  7/7/21  PSA = 0.47    Currently receiving 10 fx of RT at C7  Tolerating well.   Dr Lizarraga decided to not radiate the prostate  He is tolerating the RT well.  He has no radicular symptoms.        2. Stage III ([id number]), grade 3 of 4, clear-cell RCC of right kidney:  - Incidentally diagnosed as above.   - Underwent curative-intent robotic right radical nephrectomy with Dr. Wesley Cleveland on 3/19/19. No tumor spillage per op report.   - " "Path showed: \"Histologic Type: Clear cell renal cell carcinoma; Sarcomatoid Features: Not identified; Histologic Grade: Nucleolar grade 3 (WHO/ISUP). Extent Tumor Size: 4.3 cm. Microscopic Tumor Extension: Tumor extension into renal sinus (in vascular structures). Margins: Negative. Tumor Necrosis: Present; focal. Lymph-Vascular Invasion: Not identified.  Pathologic Staging (pTNM) Primary Tumor (pT): pT3a: Tumor extends into renal vein branches/renal sinus. Regional Lymph Nodes (pN): pNX. Number of Lymph Nodes Examined: 0 Distant Metastasis (pM): pM N/A.\"  - Restaging scans as above.    PAST MEDICAL HISTORY:  Past Medical History:   Diagnosis Date     Cancer of kidney, right (H)      Complication of anesthesia     slow wakeup      Malignant neoplasm of prostate metastatic to bone (H) 2/14/2019     Thyroid disease      PAST SURGICAL HISTORY:   Past Surgical History:   Procedure Laterality Date     CYSTOSCOPY      about 10 years ago     INSERT PORT VASCULAR ACCESS Right 5/2/2019    Procedure: Chest Port Placement;  Surgeon: Tate Burciaga PA-C;  Location: [facility] OR     IR CHEST PORT PLACEMENT > 5 YRS OF AGE  5/2/2019     LAPAROSCOPIC NEPHRECTOMY Right 3/19/2019    Procedure: Right laparoscopic radical nephrectomy;  Surgeon: Wesley Cleveland MD;  Location: [facility] OR      SOCIAL HISTORY:   Social History     Tobacco Use     Smoking status: Never Smoker     Smokeless tobacco: Never Used   Substance Use Topics     Alcohol use: None     Comment: wine - very rare     Drug use: No     FAMILY HISTORY:   Family History   Problem Relation Age of Onset     Diabetes Father      ALLERGIES:   Allergies   Allergen Reactions     Doxycycline GI Disturbance     CURRENT MEDICATIONS:   Current Outpatient Medications:      amLODIPine (NORVASC) 10 MG tablet, Take 1 tablet (10 mg) by mouth daily, Disp: 30 tablet, Rfl: 0     atorvastatin (LIPITOR) 20 MG tablet, Take 60 mg by mouth daily , Disp: , Rfl:      calcium citrate-vitamin " D (CITRACAL) 315-250 MG-UNIT TABS per tablet, Take 650 mg by mouth 2 times daily , Disp: , Rfl:      cetirizine (ZYRTEC) 10 MG tablet, Take 10 mg by mouth as needed for allergies , Disp: , Rfl:      cycloSPORINE (RESTASIS) 0.05 % ophthalmic emulsion, Place 1 drop into both eyes 2 times daily , Disp: , Rfl:      doxycycline hyclate (VIBRA-TABS) 100 MG tablet, Take 100 mg by mouth daily Daily every other month, Disp: , Rfl:      fluticasone (FLONASE) 50 MCG/ACT nasal spray, Spray 2 sprays in nostril daily as needed , Disp: , Rfl:      Glucosamine-Chondroit-Vit C-Mn (GLUCOSAMINE 1500 COMPLEX) CAPS, Take 1 capsule by mouth daily, Disp: , Rfl:      levothyroxine (SYNTHROID/LEVOTHROID) 100 MCG tablet, Take 100 mcg by mouth daily, Disp: , Rfl:      MAGNESIUM PO, , Disp: , Rfl:      MEBENDAZOLE PO, Take 112 mg by mouth daily, Disp: , Rfl:      metFORMIN (GLUCOPHAGE) 500 MG tablet, Take 500 mg by mouth 2 times daily (with meals), Disp: , Rfl:      Multiple Vitamins-Minerals (MULTIVITAMIN ADULT EXTRA C PO), Take 1 tablet by mouth every 24 hours, Disp: , Rfl:      Nutritional Supplements (SALMON OIL) CAPS, Take 2 capsules by mouth daily , Disp: , Rfl:      omeprazole (PRILOSEC) 20 MG DR capsule, Take 20 mg by mouth daily, Disp: , Rfl:      tamsulosin (FLOMAX) 0.4 MG capsule, Take 1 capsule (0.4 mg) by mouth daily, Disp: 30 capsule, Rfl: 3    PHYSICAL EXAMINATION:  VITALS: /77   Pulse 62   Temp 98.2  F (36.8  C) (Oral)   Resp 20   Wt 110.6 kg (243 lb 14.4 oz)   SpO2 98%   BMI 33.08 kg/m       ECOG PS 1.    General: Patient appears well in no acute distress.   Skin: No visualized rash or lesions on visualized skin  Eyes: EOMI, no erythema, sclera icterus or discharge noted  Resp: Appears to be breathing comfortably without accessory muscle usage, speaking in full sentences, no cough  MSK: Appears to have normal range of motion based on visualized movements  Neurologic: No apparent tremors, facial movements  "symmetric  Psych: affect normal, alert and oriented    LABORATORY DATA/IMAGING:     I personally reviewed labs and imaging today     ASSESSMENT & PLAN: Mr. Reyes is a delightful 61 year old gentleman with recently diagnosed metastatic castration sensitive prostate adenocarcinoma as well as an incidentally diagnosed stage III clear-cell renal cell carcinoma of the right kidney (s/p radical R nephrectomy 3/19/19), who is here for a follow-up visit after completion of docetaxel for prostate cancer.     1. Metastatic prostate cancer, with involvement of the bones and RPLN:   - Patient met the criteria for CHAARTED \"high-volume\" metastatic hormone-sensitive prostate cancer. He opted for docetaxel in combination with ADT (per CHAARTED, [id number], STAMPEDE). Tolerated 6 cycles of docetaxel fairly well (5/8/19 through 8/21/19), with the exception of mild fatigue and mild neuropathy. Now on ADT and tolerating fairly well.  - Reviewed restaging CT CAP and NM Bone Scan (acutal images) from 6/30/21. Does note some progression of bone lesions, though with frequent bone scans, this can show some more variance than actual disease progression. Dr. Winters joined part of the visit and discussion. Since he has not had prostate directed therapies, will refer to RT for potential prostate RT as this could help LUT symptoms as well. He does have a large lesion in the C spine, though he is asymptomatic. Will get MRI of c spine to assess and then could have RT to this lesion as well.   - Continue leuprolide 22.5mg every 3 months,  -Scans in November  -Will reach out to [facility] to help obtain Caris testing on original biopsy   -could consider Guardant 360 after RT if the PSA rises.   -continue zometa until April of 2022 ( two full years of therapy)    2. Bone metastases:   - Noted to have osseous metastatic disease at the time of diagnosis. Worsening mets seen on imaging today   - Encouraged to continue calcium and vitamin D " supplementation.  - Will continue Zometa 4mg IV every 3 months, will get dose today.Reassess for continued therapy in April 2022.     3. Stage 3, UISS-high risk ccRCC:  - pproximately 40-50% risk of recurrence after definitive surgery.   - no clear evidence of residual disease at this time; the retroperitoneal lymphadenopathy is more consistent with metastatic prostate cancer   - Continue active surveillance with self-reporting for signs/symptoms of recurrence (this was previously explained in detail) and periodic H&P and scans.     4. COVID - recovered but still needs to be vaccinated! Encouraged.     Jj Winters MD  [facility]  [address] [place] [address] [phone]

## 2021-08-17 ENCOUNTER — APPOINTMENT (OUTPATIENT)
Dept: RADIATION ONCOLOGY | Facility: CLINIC | Age: 61
End: 2021-08-17
Attending: RADIOLOGY
Payer: COMMERCIAL

## 2021-08-17 PROCEDURE — 77387 GUIDANCE FOR RADJ TX DLVR: CPT | Performed by: RADIOLOGY

## 2021-08-17 PROCEDURE — 77412 RADIATION TX DELIVERY LVL 3: CPT | Performed by: RADIOLOGY

## 2021-08-17 PROCEDURE — 77014 PR CT GUIDE FOR PLACEMENT RADIATION THERAPY FIELDS: CPT | Mod: 26 | Performed by: RADIOLOGY

## 2021-08-18 ENCOUNTER — ONCOLOGY VISIT (OUTPATIENT)
Dept: RADIATION ONCOLOGY | Facility: CLINIC | Age: 61
End: 2021-08-18

## 2021-08-18 ENCOUNTER — APPOINTMENT (OUTPATIENT)
Dept: RADIATION ONCOLOGY | Facility: CLINIC | Age: 61
End: 2021-08-18
Attending: RADIOLOGY
Payer: COMMERCIAL

## 2021-08-18 PROCEDURE — 77387 GUIDANCE FOR RADJ TX DLVR: CPT | Performed by: RADIOLOGY

## 2021-08-18 PROCEDURE — 77336 RADIATION PHYSICS CONSULT: CPT | Performed by: RADIOLOGY

## 2021-08-18 PROCEDURE — 77014 PR CT GUIDE FOR PLACEMENT RADIATION THERAPY FIELDS: CPT | Mod: 26 | Performed by: RADIOLOGY

## 2021-08-18 PROCEDURE — 77412 RADIATION TX DELIVERY LVL 3: CPT | Performed by: RADIOLOGY

## 2021-08-18 PROCEDURE — 77427 RADIATION TX MANAGEMENT X5: CPT | Performed by: RADIOLOGY

## 2021-08-20 NOTE — PROCEDURES
Radiation Oncology:  [facility] [address] [place] [address]        Radiotherapy Treatment Summary        Date of Report:  2021             PATIENT: AZRA SIERRA  MEDICAL RECORD NO: [id number]  : 1960     DIAGNOSIS: C79.51 Secondary malignant neoplasm of bone     INTENT OF RADIOTHERAPY: Palliative     PATHOLOGY:  Prostate cancer.                                  STAGE:      Details of the treatments summarized below are found in records kept in the Department of Radiation Oncology @ [facility].     Treatment Summary:  Radiation Oncology - Course: 1 Protocol:   Treatment Site Current Dose Modality From To Elapsed Days Fx.  C7  3,000 cGy 06 X  8/05/2021  2021  13 10         Total Dose: 3000 cGy Dose per fraction: 300 cGy           COMMENTS:   (Acute Toxicity Profile by CTC v5.0)  The patient completed palliative radiotherapy to the neck (Lower C spine) for metastatic prostate   cancer with encroachment to the cervical spinal cord.     The patient completed the radiotherapy without complications or toxicity and no further radiotherapy   is recommended at this time.        FOLLOW UP PLAN:   Follow up in 2 months with a PSA     Staff Physician  YVAN Lizarraga M.D.     Physicist   Prasanth Almazan, PhD.     CC:   Jj Winters MD

## 2021-09-04 ENCOUNTER — HEALTH MAINTENANCE LETTER (OUTPATIENT)
Age: 61
End: 2021-09-04

## 2021-11-11 ENCOUNTER — HOSPITAL ENCOUNTER (OUTPATIENT)
Dept: NUCLEAR MEDICINE | Facility: CLINIC | Age: 61
Setting detail: NUCLEAR MEDICINE
End: 2021-11-11
Attending: INTERNAL MEDICINE
Payer: COMMERCIAL

## 2021-11-11 ENCOUNTER — HOSPITAL ENCOUNTER (OUTPATIENT)
Dept: CT IMAGING | Facility: CLINIC | Age: 61
End: 2021-11-11
Attending: INTERNAL MEDICINE
Payer: COMMERCIAL

## 2021-11-11 DIAGNOSIS — C79.51 MALIGNANT NEOPLASM OF PROSTATE METASTATIC TO BONE (H): ICD-10-CM

## 2021-11-11 DIAGNOSIS — C61 MALIGNANT NEOPLASM OF PROSTATE METASTATIC TO BONE (H): ICD-10-CM

## 2021-11-11 PROCEDURE — 343N000001 HC RX 343: Performed by: INTERNAL MEDICINE

## 2021-11-11 PROCEDURE — 78306 BONE IMAGING WHOLE BODY: CPT | Mod: 26

## 2021-11-11 PROCEDURE — 250N000011 HC RX IP 250 OP 636

## 2021-11-11 PROCEDURE — A9503 TC99M MEDRONATE: HCPCS | Performed by: INTERNAL MEDICINE

## 2021-11-11 PROCEDURE — 71260 CT THORAX DX C+: CPT | Mod: 26 | Performed by: RADIOLOGY

## 2021-11-11 PROCEDURE — 74177 CT ABD & PELVIS W/CONTRAST: CPT | Mod: 26 | Performed by: RADIOLOGY

## 2021-11-11 PROCEDURE — 71260 CT THORAX DX C+: CPT

## 2021-11-11 PROCEDURE — 78306 BONE IMAGING WHOLE BODY: CPT

## 2021-11-11 PROCEDURE — 250N000011 HC RX IP 250 OP 636: Performed by: INTERNAL MEDICINE

## 2021-11-11 RX ORDER — IOPAMIDOL 755 MG/ML
135 INJECTION, SOLUTION INTRAVASCULAR ONCE
Status: COMPLETED | OUTPATIENT
Start: 2021-11-11 | End: 2021-11-11

## 2021-11-11 RX ORDER — TC 99M MEDRONATE 20 MG/10ML
20-30 INJECTION, POWDER, LYOPHILIZED, FOR SOLUTION INTRAVENOUS ONCE
Status: COMPLETED | OUTPATIENT
Start: 2021-11-11 | End: 2021-11-11

## 2021-11-11 RX ORDER — HEPARIN SODIUM (PORCINE) LOCK FLUSH IV SOLN 100 UNIT/ML 100 UNIT/ML
5 SOLUTION INTRAVENOUS ONCE
Status: COMPLETED | OUTPATIENT
Start: 2021-11-11 | End: 2021-11-11

## 2021-11-11 RX ADMIN — SODIUM CHLORIDE, PRESERVATIVE FREE 5 ML: 5 INJECTION INTRAVENOUS at 08:43

## 2021-11-11 RX ADMIN — IOPAMIDOL 135 ML: 755 INJECTION, SOLUTION INTRAVENOUS at 08:27

## 2021-11-11 RX ADMIN — TC 99M MEDRONATE 26.2 MCI.: 20 INJECTION, POWDER, LYOPHILIZED, FOR SOLUTION INTRAVENOUS at 08:00

## 2021-11-15 ENCOUNTER — ONCOLOGY VISIT (OUTPATIENT)
Dept: ONCOLOGY | Facility: CLINIC | Age: 61
End: 2021-11-15
Attending: INTERNAL MEDICINE
Payer: COMMERCIAL

## 2021-11-15 ENCOUNTER — APPOINTMENT (OUTPATIENT)
Dept: LAB | Facility: CLINIC | Age: 61
End: 2021-11-15
Attending: INTERNAL MEDICINE
Payer: COMMERCIAL

## 2021-11-15 ENCOUNTER — DOCUMENTATION ONLY (OUTPATIENT)
Dept: ONCOLOGY | Facility: CLINIC | Age: 61
End: 2021-11-15

## 2021-11-15 ENCOUNTER — TELEPHONE (OUTPATIENT)
Dept: ONCOLOGY | Facility: CLINIC | Age: 61
End: 2021-11-15

## 2021-11-15 VITALS
OXYGEN SATURATION: 96 % | HEART RATE: 61 BPM | TEMPERATURE: 98 F | WEIGHT: 246.8 LBS | SYSTOLIC BLOOD PRESSURE: 124 MMHG | RESPIRATION RATE: 16 BRPM | DIASTOLIC BLOOD PRESSURE: 75 MMHG | BODY MASS INDEX: 33.47 KG/M2

## 2021-11-15 DIAGNOSIS — C79.51 MALIGNANT NEOPLASM OF PROSTATE METASTATIC TO BONE (H): Primary | ICD-10-CM

## 2021-11-15 DIAGNOSIS — C61 PROSTATE CANCER (H): ICD-10-CM

## 2021-11-15 DIAGNOSIS — C61 MALIGNANT NEOPLASM OF PROSTATE METASTATIC TO BONE (H): Primary | ICD-10-CM

## 2021-11-15 LAB
ALBUMIN SERPL-MCNC: 3.7 G/DL (ref 3.4–5)
ALP SERPL-CCNC: 128 U/L (ref 40–150)
ALT SERPL W P-5'-P-CCNC: 32 U/L (ref 0–70)
ANION GAP SERPL CALCULATED.3IONS-SCNC: 11 MMOL/L (ref 3–14)
AST SERPL W P-5'-P-CCNC: 19 U/L (ref 0–45)
BILIRUB SERPL-MCNC: 0.4 MG/DL (ref 0.2–1.3)
BUN SERPL-MCNC: 17 MG/DL (ref 7–30)
CALCIUM SERPL-MCNC: 8.9 MG/DL (ref 8.5–10.1)
CHLORIDE BLD-SCNC: 106 MMOL/L (ref 94–109)
CO2 SERPL-SCNC: 27 MMOL/L (ref 20–32)
CREAT SERPL-MCNC: 0.92 MG/DL (ref 0.66–1.25)
GFR SERPL CREATININE-BSD FRML MDRD: >90 ML/MIN/1.73M2
GLUCOSE BLD-MCNC: 102 MG/DL (ref 70–99)
POTASSIUM BLD-SCNC: 4 MMOL/L (ref 3.4–5.3)
PROT SERPL-MCNC: 7.3 G/DL (ref 6.8–8.8)
PSA SERPL-MCNC: 1.05 UG/L (ref 0–4)
SODIUM SERPL-SCNC: 144 MMOL/L (ref 133–144)

## 2021-11-15 PROCEDURE — 250N000011 HC RX IP 250 OP 636: Performed by: INTERNAL MEDICINE

## 2021-11-15 PROCEDURE — 36591 DRAW BLOOD OFF VENOUS DEVICE: CPT | Performed by: INTERNAL MEDICINE

## 2021-11-15 PROCEDURE — 99214 OFFICE O/P EST MOD 30 MIN: CPT | Performed by: INTERNAL MEDICINE

## 2021-11-15 PROCEDURE — 96402 CHEMO HORMON ANTINEOPL SQ/IM: CPT

## 2021-11-15 PROCEDURE — 258N000003 HC RX IP 258 OP 636: Performed by: INTERNAL MEDICINE

## 2021-11-15 PROCEDURE — 82040 ASSAY OF SERUM ALBUMIN: CPT | Performed by: INTERNAL MEDICINE

## 2021-11-15 PROCEDURE — 96365 THER/PROPH/DIAG IV INF INIT: CPT

## 2021-11-15 PROCEDURE — 84153 ASSAY OF PSA TOTAL: CPT | Performed by: INTERNAL MEDICINE

## 2021-11-15 RX ORDER — HEPARIN SODIUM,PORCINE 10 UNIT/ML
5 VIAL (ML) INTRAVENOUS
Status: CANCELLED | OUTPATIENT
Start: 2022-02-13

## 2021-11-15 RX ORDER — HEPARIN SODIUM (PORCINE) LOCK FLUSH IV SOLN 100 UNIT/ML 100 UNIT/ML
500 SOLUTION INTRAVENOUS ONCE
Status: COMPLETED | OUTPATIENT
Start: 2021-11-15 | End: 2021-11-15

## 2021-11-15 RX ORDER — ZOLEDRONIC ACID 0.04 MG/ML
4 INJECTION, SOLUTION INTRAVENOUS ONCE
Status: COMPLETED | OUTPATIENT
Start: 2021-11-15 | End: 2021-11-15

## 2021-11-15 RX ORDER — HEPARIN SODIUM (PORCINE) LOCK FLUSH IV SOLN 100 UNIT/ML 100 UNIT/ML
5 SOLUTION INTRAVENOUS
Status: CANCELLED | OUTPATIENT
Start: 2022-02-13

## 2021-11-15 RX ORDER — LORATADINE 10 MG/1
10 TABLET ORAL DAILY
COMMUNITY
Start: 2021-11-02

## 2021-11-15 RX ORDER — ZOLEDRONIC ACID 0.04 MG/ML
4 INJECTION, SOLUTION INTRAVENOUS ONCE
Status: CANCELLED | OUTPATIENT
Start: 2022-02-13 | End: 2022-02-13

## 2021-11-15 RX ORDER — ZOLEDRONIC ACID 0.04 MG/ML
4 INJECTION, SOLUTION INTRAVENOUS ONCE
Status: CANCELLED | OUTPATIENT
Start: 2021-11-15 | End: 2021-11-15

## 2021-11-15 RX ORDER — HEPARIN SODIUM (PORCINE) LOCK FLUSH IV SOLN 100 UNIT/ML 100 UNIT/ML
5 SOLUTION INTRAVENOUS DAILY PRN
Status: DISCONTINUED | OUTPATIENT
Start: 2021-11-15 | End: 2021-11-21 | Stop reason: HOSPADM

## 2021-11-15 RX ADMIN — ZOLEDRONIC ACID 4 MG: 0.04 INJECTION, SOLUTION INTRAVENOUS at 10:40

## 2021-11-15 RX ADMIN — LEUPROLIDE ACETATE 22.5 MG: KIT at 09:57

## 2021-11-15 RX ADMIN — SODIUM CHLORIDE 250 ML: 9 INJECTION, SOLUTION INTRAVENOUS at 10:40

## 2021-11-15 RX ADMIN — Medication 5 ML: at 08:37

## 2021-11-15 RX ADMIN — Medication 500 UNITS: at 11:05

## 2021-11-15 ASSESSMENT — PAIN SCALES - GENERAL: PAINLEVEL: NO PAIN (0)

## 2021-11-15 NOTE — NURSING NOTE
Chief Complaint   Patient presents with     Port Draw     Labs drawn via port by RN in lab. VS taken      Port accessed with 20 gauge 3/4 in gripper needle by RN, labs collected, line flushed with saline and heparin.  Vitals taken. Pt checked in for next appointment.    Marti Smith RN

## 2021-11-15 NOTE — PROGRESS NOTES
Infusion Nursing Note:  Walter Reyes presents today for Zometa Infusion.    Patient seen and examined by Dr Winters in clinic      Intravenous Access:  Implanted Port.    Treatment Conditions:    Component      Latest Ref Rng & Units 11/15/2021   Sodium      133 - 144 mmol/L 144   Potassium      3.4 - 5.3 mmol/L 4.0   Chloride      94 - 109 mmol/L 106   Carbon Dioxide      20 - 32 mmol/L 27   Anion Gap      3 - 14 mmol/L 11   Urea Nitrogen      7 - 30 mg/dL 17   Creatinine      0.66 - 1.25 mg/dL 0.92   Calcium      8.5 - 10.1 mg/dL 8.9   Glucose      70 - 99 mg/dL 102 (H)   Alkaline Phosphatase      40 - 150 U/L 128   AST      0 - 45 U/L 19   ALT      0 - 70 U/L 32   Protein Total      6.8 - 8.8 g/dL 7.3   Albumin      3.4 - 5.0 g/dL 3.7   Bilirubin Total      0.2 - 1.3 mg/dL 0.4   GFR Estimate      >60 mL/min/1.73m2 >90       Results reviewed, labs MET treatment parameters, ok to proceed with treatment.      Post Infusion Assessment:  Patient tolerated infusion without incident.       Discharge Plan:   Patient declined prescription refills.  AVS to patient via [employer].    Check out orders have not been placed by Dr Winters yet.  Pt is aware he will need to return for lupron and zometa in 3 months.      Tran Washington RN

## 2021-11-15 NOTE — PROGRESS NOTES
"MEDICAL ONCOLOGY FOLLOW-UP NOTE  Nov 15, 2021    REASON FOR VISIT: Follow-up for metastatic hormone-sensitive prostate cancer, currently on ADT alone.    HISTORY OF PRESENT ILLNESS: Mr. 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 His oncologic history is detailed below.     ONCOLOGIC HISTORY:  1. 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 Naylor at [facility]. Prior PSA were 1.4 on 8/16/17, 2.4 on 6/28/16, 2.9 on 6/28/16, and as low as 0.4 on 3/26/2003.   - 1/08/2019: Consultation with Sarah Wilson CNP in Urology clinic. Repeat PSA 9.6.  - 1/16/2019: MRI prostate with contrast - \"This examination is characterized as PIRADS 5- very high probability. Clinically significant cancer is highly likely to be present. There is a large, invasive mass arising from the right peripheral zone and extending into the neurovascular bundle, seminal vesicle, and along the anterolateral right mesorectal fascia. Metastatic right external iliac lymph node. Metastatic lesion in the posterior/superior right acetabulum.\"  - 1/25/2019: CT abdomen and pelvis with IV contrast - \"1. Heterogeneous enhancing mass posteriorly in the upper pole of the right kidney measures 4.5 x 5.8 x 5.7 cm (AP by transverse by craniocaudal). It has a small nodular component extending posterior medially which abuts the right psoas muscle. This nodular extension measures 2.1 x 2.1 cm. Minimal stranding about the mass. No definite thrombus within the right renal vein. This renal mass is compatible with a renal cell carcinoma. A paraaortic lymph node situated immediately posterior to the left renal vein measures 1.1 cm in short axis, " "suspicious for metastasis. 2. A 2 cm right external iliac lymph node is also suspicious for metastases. 3. Multiple sclerotic osseous lesions suspicious for metastases. These include 0.8 and 0.6 cm sclerotic lesions laterally in the right iliac wing (images 53 and 62 respectively). Ill-defined groundglass density laterally in the right acetabulum measuring 1.7 x 1.2 cm corresponds with the lesion identified on MRI. A 0.4 cm groundglass density in the left acetabulum. Sclerotic lesion in the left femoral neck measures 0.9 x 1.6 cm (image 81). Sclerotic metastases would be more compatible with prostate metastases. 4. A 3 subcentimeter hepatic lesions are indeterminate. Metastases would be a consideration. These can be further characterized with liver MRI.\"  - 1/25/2019: NM bone scan - \"There is focal bony uptake in the left femoral neck, right acetabulum, right of midline at the S1 or L5 level of the spine, within multiple bilateral ribs, in the C7 or T1 level of the spine, and anteriorly within the skull. Findings are suspicious for metastases.\"  - 1/31/19: CT chest with contrast - \" No suspicious nodules in the chest.  Stable appearance of a 5.8 cm right renal mass concerning for renal carcinoma until proven otherwise.  Stable indeterminant subcentimeter hypodensities in the liver.  Multifocal osteoblastic metastasis including 2.5 cm lesion in the right fifth rib posteriorly and a 1.6 cm lesion in the right third rib posteriorly. No lytic lesions identified.\"  - 2/6/19: CT-guided right sclerotic fifth rib lesion biopsy - \"Metastatic carcinoma, consistent with prostate primary.  Immunohistochemical stains performed show the metastatic carcinoma stains positive for NKX3.1 (prostate marker), negative for STACIE 3 and PAX8, which supports the above diagnosis.\"  - 2/15/19: Started Casodex 50mg every day and consented for the biobanking protocol. 3/18/19 - stopped Casodex.  - 2/19/19: Case discussed in tumor board - " "recommendation for nephectomy for suspected malignant right renal mass.   - 2/26/19: Started Lupron 22.5mg every 3 months.   - 5/8/19: Started Docetaxel 75mg/m2 IV every 3 weeks. 06/18/19 - cycle 3, 7/10/19 - cycle 4, 07/31/19 - cycle 5, 08/21/19 - cycle 6.   - 10/2/19: Restaging CT CAP and NM Bone Scan showed improved osseous disease, no evidence of recurrent or new metastatic disease.  - 1/5/20: Restaging CT CAP and NM Bone Scan showed stable osseous disease, no evidence of recurrent or new metastatic disease.  - 4/6/20: NM bone scan with slightly improved uptake in known skeletal mets. CT C/A/P with contrast with stable osseous mets, no yusuf enlargement, no new visceral mets etc.  - 04/08/20: Zometa every 3 months.  - 10/5/20: CT C/A/P with contrast and NM bone scan - SD.   - 1/4/21: CT C/A/P with contrast and NM bone scan - SD but slight increase in right 5th rib tracer uptake and in posterior L5 sclerosis.   - 03/29/21: CT C/A/P with contrast - single new right sacral 8mm bone lesion (suspicious), other bone mets stable. No visceral/yusuf disease. Nephrectomy site without recurrence. NM bone scan - SD but \"increased uptake associated with the prior lesions of the lower  cervical spine, posterior right fourth rib and right L5 posterior arch elements. Otherwise unchanged uptake associated with the proximal left femur and left anterior fourth rib. Similar uptake of the left halux, possibly secondary to degenerative osteoarthritis or gout.\"    1/6/21  PSA = 0.29  3/31/21  PSA = 0.44  7/7/21  PSA = 0.47  7/7/21-10 fx of RT at C7. Tolerating well. Dr Lizarraga decided to not radiate the prostate.  He is tolerating the RT well.  He has no radicular symptoms.     10/7/21-CARIS testing from biopsy 6/2019 without targetable lesions.  Overall reads limited by insufficient or poor sample quality of specimen.     11/15/21-New sclerotic lesions noted on restaging CT CAP and confirmed on NM bone scan with new T9, 4th rib and " "sacral ala. PSA increased to 1.05, now PSADT of 3.4 months.  Plan to start enzalutamide and continue ADT.  He is interested in enrolling on cognitive function study (COGCAP) and is pending screening.      2. Stage III ([id number]), grade 3 of 4, clear-cell RCC of right kidney:  - Incidentally diagnosed as above.   - Underwent curative-intent robotic right radical nephrectomy with Dr. Wesley Cleveland on 3/19/19. No tumor spillage per op report.   - Path showed: \"Histologic Type: Clear cell renal cell carcinoma; Sarcomatoid Features: Not identified; Histologic Grade: Nucleolar grade 3 (WHO/ISUP). Extent Tumor Size: 4.3 cm. Microscopic Tumor Extension: Tumor extension into renal sinus (in vascular structures). Margins: Negative. Tumor Necrosis: Present; focal. Lymph-Vascular Invasion: Not identified.  Pathologic Staging (pTNM) Primary Tumor (pT): pT3a: Tumor extends into renal vein branches/renal sinus. Regional Lymph Nodes (pN): pNX. Number of Lymph Nodes Examined: 0 Distant Metastasis (pM): pM N/A.\"  -11/11/21-Restaging CT CAP without concerning signs of recurrence.     Interval History:  Feeling OK overall.  Throwing back out a bit more frequently, but gentle adjustment with chiropractor are very helpful.  No new pains.  Hot flashes are at baseline.  Energy is about the same as before.  Having nocturia if he doesn't take flomax early enough in the evening, but no major issues with nocturia if taken early evening.  Denies other complaints at this time.      PAST MEDICAL HISTORY:  Past Medical History:   Diagnosis Date     Cancer of kidney, right (H)      Complication of anesthesia     slow wakeup      Malignant neoplasm of prostate metastatic to bone (H) 2/14/2019     Thyroid disease      PAST SURGICAL HISTORY:   Past Surgical History:   Procedure Laterality Date     CYSTOSCOPY      about 10 years ago     INSERT PORT VASCULAR ACCESS Right 5/2/2019    Procedure: Chest Port Placement;  Surgeon: Tate Burciaga PA-C;  " Location: [facility] OR     IR CHEST PORT PLACEMENT > 5 YRS OF AGE  5/2/2019     LAPAROSCOPIC NEPHRECTOMY Right 3/19/2019    Procedure: Right laparoscopic radical nephrectomy;  Surgeon: Wesley Cleveland MD;  Location: [facility] OR      SOCIAL HISTORY:   Social History     Tobacco Use     Smoking status: Never Smoker     Smokeless tobacco: Never Used   Substance Use Topics     Alcohol use: Not on file     Comment: wine - very rare     Drug use: No     FAMILY HISTORY:   Family History   Problem Relation Age of Onset     Diabetes Father      ALLERGIES:   Allergies   Allergen Reactions     Doxycycline GI Disturbance     CURRENT MEDICATIONS:   Current Outpatient Medications:      amLODIPine (NORVASC) 10 MG tablet, Take 1 tablet (10 mg) by mouth daily, Disp: 30 tablet, Rfl: 0     atorvastatin (LIPITOR) 20 MG tablet, Take 60 mg by mouth daily , Disp: , Rfl:      calcium citrate-vitamin D (CITRACAL) 315-250 MG-UNIT TABS per tablet, Take 650 mg by mouth 2 times daily , Disp: , Rfl:      cetirizine (ZYRTEC) 10 MG tablet, Take 10 mg by mouth as needed for allergies , Disp: , Rfl:      cycloSPORINE (RESTASIS) 0.05 % ophthalmic emulsion, Place 1 drop into both eyes 2 times daily , Disp: , Rfl:      doxycycline hyclate (VIBRA-TABS) 100 MG tablet, Take 100 mg by mouth daily Daily every other month, Disp: , Rfl:      fluticasone (FLONASE) 50 MCG/ACT nasal spray, Spray 2 sprays in nostril daily as needed , Disp: , Rfl:      Glucosamine-Chondroit-Vit C-Mn (GLUCOSAMINE 1500 COMPLEX) CAPS, Take 1 capsule by mouth daily, Disp: , Rfl:      levothyroxine (SYNTHROID/LEVOTHROID) 100 MCG tablet, Take 100 mcg by mouth daily, Disp: , Rfl:      loratadine (CLARITIN) 10 MG tablet, , Disp: , Rfl:      MAGNESIUM PO, Take 250 mg by mouth 2 times daily , Disp: , Rfl:      MEBENDAZOLE PO, Take 225 mg by mouth daily , Disp: , Rfl:      metFORMIN (GLUCOPHAGE) 500 MG tablet, Take 1,500 mg by mouth 2 times daily (with meals) 500 mg 1000 at PM, Disp: , Rfl:       Multiple Vitamins-Minerals (MULTIVITAMIN ADULT EXTRA C PO), Take 1 tablet by mouth every 24 hours, Disp: , Rfl:      Nutritional Supplements (SALMON OIL) CAPS, Take 2 capsules by mouth daily , Disp: , Rfl:      omeprazole (PRILOSEC) 20 MG DR capsule, Take 20 mg by mouth daily, Disp: , Rfl:      tamsulosin (FLOMAX) 0.4 MG capsule, Take 1 capsule (0.4 mg) by mouth daily, Disp: 30 capsule, Rfl: 3    Current Facility-Administered Medications:      heparin 100 UNIT/ML injection 5 mL, 5 mL, Intracatheter, Daily PRN, Jj Winters MD, 5 mL at 11/15/21 0837     leuprolide (LUPRON DEPOT) kit 22.5 mg, 22.5 mg, Intramuscular, Q90 Days, Jj Winters MD    PHYSICAL EXAMINATION:  VITALS: /75   Pulse 61   Temp 98  F (36.7  C) (Oral)   Resp 16   Wt 111.9 kg (246 lb 12.8 oz)   SpO2 96%   BMI 33.47 kg/m       ECOG PS 1.    General: Patient appears well in no acute distress.   Skin: No visualized rash or lesions on visualized skin  Eyes: EOMI, no erythema, sclera icterus or discharge noted  Resp: Appears to be breathing comfortably without accessory muscle usage, speaking in full sentences, no cough  MSK: Appears to have normal range of motion based on visualized movements  Neurologic: No apparent tremors, facial movements symmetric  Psych: affect normal, alert and oriented    LABORATORY DATA/IMAGING:     I personally reviewed labs and imaging today     CT CAP 11/11/21                                                               IMPRESSION:   1. New sclerotic lesions in the pelvic bones consistent with  metastases(likely from prostate). Increased size of sclerotic  metastases in the sacrum, L5, and right fifth rib. The sclerotic  lesions in the left pelvic bone and left femoral neck appear stable.  2. Increased sclerosis of C7 metastasis, presumed postradiation  changes.  3. Stable appearance of the prostate compared to 6/30/2021.  4. No evidence of non-osseous metastatic disease in the chest,  abdomen, and  pelvis.  5. Changes of right nephrectomy, without suspicious nodules or masses  in the nephrectomy bed.  6. Stable large hiatal hernia.    NM Bones scan 11/11/21  FINDINGS: There has been interval progression of the metastatic  disease with a new lesion involving the right T9 transverse process,  greater involvement of the right posterior approximate fourth rib, and  involvement of the sacral body and ala.     Stable degenerative changes of patella and tibial tuberosities.                                                                      IMPRESSION: Interval progression of the metastatic disease as  described above.     CARIS testing 10/2021 from sternal biopsy        PSA 1.05 11/15/21  Last Comprehensive Metabolic Panel:  Sodium   Date Value Ref Range Status   11/15/2021 144 133 - 144 mmol/L Final   07/07/2021 139 133 - 144 mmol/L Final     Potassium   Date Value Ref Range Status   11/15/2021 4.0 3.4 - 5.3 mmol/L Final   07/07/2021 4.1 3.4 - 5.3 mmol/L Final     Chloride   Date Value Ref Range Status   11/15/2021 106 94 - 109 mmol/L Final   07/07/2021 107 94 - 109 mmol/L Final     Carbon Dioxide   Date Value Ref Range Status   07/07/2021 25 20 - 32 mmol/L Final     Carbon Dioxide (CO2)   Date Value Ref Range Status   11/15/2021 27 20 - 32 mmol/L Final     Anion Gap   Date Value Ref Range Status   11/15/2021 11 3 - 14 mmol/L Final   07/07/2021 7 3 - 14 mmol/L Final     Glucose   Date Value Ref Range Status   11/15/2021 102 (H) 70 - 99 mg/dL Final   07/07/2021 105 (H) 70 - 99 mg/dL Final     Urea Nitrogen   Date Value Ref Range Status   11/15/2021 17 7 - 30 mg/dL Final   07/07/2021 15 7 - 30 mg/dL Final     Creatinine   Date Value Ref Range Status   11/15/2021 0.92 0.66 - 1.25 mg/dL Final   07/07/2021 0.99 0.66 - 1.25 mg/dL Final     GFR Estimate   Date Value Ref Range Status   11/15/2021 >90 >60 mL/min/1.73m2 Final     Comment:     As of July 11, 2021, eGFR is calculated by the CKD-EPI creatinine equation,  "without race adjustment. eGFR can be influenced by muscle mass, exercise, and diet. The reported eGFR is an estimation only and is only applicable if the renal function is stable.   07/07/2021 82 >60 mL/min/[1.73_m2] Final     Comment:     Non  GFR Calc  Starting 12/18/2018, serum creatinine based estimated GFR (eGFR) will be   calculated using the Chronic Kidney Disease Epidemiology Collaboration   (CKD-EPI) equation.       Calcium   Date Value Ref Range Status   11/15/2021 8.9 8.5 - 10.1 mg/dL Final   07/07/2021 8.9 8.5 - 10.1 mg/dL Final     Bilirubin Total   Date Value Ref Range Status   11/15/2021 0.4 0.2 - 1.3 mg/dL Final   07/07/2021 0.7 0.2 - 1.3 mg/dL Final     Alkaline Phosphatase   Date Value Ref Range Status   11/15/2021 128 40 - 150 U/L Final   07/07/2021 70 40 - 150 U/L Final     ALT   Date Value Ref Range Status   11/15/2021 32 0 - 70 U/L Final   07/07/2021 25 0 - 70 U/L Final     AST   Date Value Ref Range Status   11/15/2021 19 0 - 45 U/L Final   07/07/2021 17 0 - 45 U/L Final     ASSESSMENT & PLAN: Mr. Reyes is a 61 year old gentleman with recently diagnosed metastatic castration sensitive prostate adenocarcinoma as well as an incidentally diagnosed stage III clear-cell renal cell carcinoma of the right kidney (s/p radical R nephrectomy 3/19/19), who is here for a follow-up visit after completion of docetaxel for prostate cancer.     1. Metastatic prostate cancer, with involvement of the bones and RPLN:   - Patient met the criteria for CHAARTED \"high-volume\" metastatic hormone-sensitive prostate cancer. He opted for docetaxel in combination with ADT (per CHACHRIS, [id number], STAMPEDE). Tolerated 6 cycles of docetaxel fairly well (5/8/19 through 8/21/19), with the exception of mild fatigue and mild neuropathy. Now on ADT and tolerating fairly well.  - Reviewed restaging CT CAP and NM Bone Scan (acutal images) from 6/30/21. Does note some progression of bone lesions, though " with frequent bone scans, this can show some more variance than actual disease progression. Dr. Winters joined part of the visit and discussion. Since he has not had prostate directed therapies, will refer to RT for potential prostate RT as this could help LUT symptoms as well, but on RT consult, prostate was not recommended for radiation. He does have a large lesion in the C spine, though he is asymptomatic. Will get MRI of c spine to assess and then could have RT to this lesion as well, for which he received 10 doses 7/2021. Given his rapid PSADT of 3.4 months at 11/15/21 visit, we discussed intiation of enzalutamide for now hormone insensitive disease.  We have placed order for this and he can start whenever it is available unless there are restrictions with enrolling on enzalutamide cognition study.    - Continue leuprolide 22.5mg every 3 months, next due 2/15/22.   -CARIS testing limited by sample/read quality, with no targetable abnormalities found on limited study.   -could consider Guardant 360 as well given rise in PSA.   -continue zometa until April of 2022 ( two full years of therapy)-next dose due 2/15/22.   -Will start enzalutamide when approved.   -Pending enrollment screen for COGCAP.      2. Bone metastases:   - Noted to have osseous metastatic disease at the time of diagnosis. Worsening mets seen on imaging today by NM bone scan.    - Encouraged to continue calcium and vitamin D supplementation.  - Will continue Zometa 4mg IV every 3 months, will get dose today (11/15/21).  Next dose due 2/15/21.  Reassess for continued therapy in April 2022.     3. Stage 3, UISS-high risk ccRCC:  - approximately 40-50% risk of recurrence after definitive surgery.   - Continue active surveillance with self-reporting for signs/symptoms of recurrence (this was previously explained in detail) and periodic H&P and scans.   -CT CAP for restaging from 11/2021 without evidence for distant or local recurrence per formal  radiology read.      4. COVID - recovered but still needs to be vaccinated! Encouraged.     Patient seen and discussed with Dr. Winters.     Omar Mcnair MD, PhD  Hematology/Oncology Fellow  Pager: [phone]

## 2021-11-15 NOTE — LETTER
"    11/15/2021         RE: Walter Reyes  [address] [facility] [address]        Dear Colleague,    Thank you for referring your patient, Walter Reyes, to the [facility]. Please see a copy of my visit note below.    MEDICAL ONCOLOGY FOLLOW-UP NOTE  Nov 15, 2021    REASON FOR VISIT: Follow-up for metastatic hormone-sensitive prostate cancer, currently on ADT alone.    HISTORY OF PRESENT ILLNESS: Mr. 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 His oncologic history is detailed below.     ONCOLOGIC HISTORY:  1. 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 Naylor at [facility]. Prior PSA were 1.4 on 8/16/17, 2.4 on 6/28/16, 2.9 on 6/28/16, and as low as 0.4 on 3/26/2003.   - 1/08/2019: Consultation with Sarah Wilson CNP in Urology clinic. Repeat PSA 9.6.  - 1/16/2019: MRI prostate with contrast - \"This examination is characterized as PIRADS 5- very high probability. Clinically significant cancer is highly likely to be present. There is a large, invasive mass arising from the right peripheral zone and extending into the neurovascular bundle, seminal vesicle, and along the anterolateral right mesorectal fascia. Metastatic right external iliac lymph node. Metastatic lesion in the posterior/superior right acetabulum.\"  - 1/25/2019: CT abdomen and pelvis with IV contrast - \"1. Heterogeneous enhancing mass posteriorly in the upper pole of the right kidney measures 4.5 x 5.8 x 5.7 cm (AP by transverse by craniocaudal). It has a small nodular component extending posterior medially which abuts the right psoas muscle. This nodular extension " "measures 2.1 x 2.1 cm. Minimal stranding about the mass. No definite thrombus within the right renal vein. This renal mass is compatible with a renal cell carcinoma. A paraaortic lymph node situated immediately posterior to the left renal vein measures 1.1 cm in short axis, suspicious for metastasis. 2. A 2 cm right external iliac lymph node is also suspicious for metastases. 3. Multiple sclerotic osseous lesions suspicious for metastases. These include 0.8 and 0.6 cm sclerotic lesions laterally in the right iliac wing (images 53 and 62 respectively). Ill-defined groundglass density laterally in the right acetabulum measuring 1.7 x 1.2 cm corresponds with the lesion identified on MRI. A 0.4 cm groundglass density in the left acetabulum. Sclerotic lesion in the left femoral neck measures 0.9 x 1.6 cm (image 81). Sclerotic metastases would be more compatible with prostate metastases. 4. A 3 subcentimeter hepatic lesions are indeterminate. Metastases would be a consideration. These can be further characterized with liver MRI.\"  - 1/25/2019: NM bone scan - \"There is focal bony uptake in the left femoral neck, right acetabulum, right of midline at the S1 or L5 level of the spine, within multiple bilateral ribs, in the C7 or T1 level of the spine, and anteriorly within the skull. Findings are suspicious for metastases.\"  - 1/31/19: CT chest with contrast - \" No suspicious nodules in the chest.  Stable appearance of a 5.8 cm right renal mass concerning for renal carcinoma until proven otherwise.  Stable indeterminant subcentimeter hypodensities in the liver.  Multifocal osteoblastic metastasis including 2.5 cm lesion in the right fifth rib posteriorly and a 1.6 cm lesion in the right third rib posteriorly. No lytic lesions identified.\"  - 2/6/19: CT-guided right sclerotic fifth rib lesion biopsy - \"Metastatic carcinoma, consistent with prostate primary.  Immunohistochemical stains performed show the metastatic carcinoma " "stains positive for NKX3.1 (prostate marker), negative for STACIE 3 and PAX8, which supports the above diagnosis.\"  - 2/15/19: Started Casodex 50mg every day and consented for the biobanking protocol. 3/18/19 - stopped Casodex.  - 2/19/19: Case discussed in tumor board - recommendation for nephectomy for suspected malignant right renal mass.   - 2/26/19: Started Lupron 22.5mg every 3 months.   - 5/8/19: Started Docetaxel 75mg/m2 IV every 3 weeks. 06/18/19 - cycle 3, 7/10/19 - cycle 4, 07/31/19 - cycle 5, 08/21/19 - cycle 6.   - 10/2/19: Restaging CT CAP and NM Bone Scan showed improved osseous disease, no evidence of recurrent or new metastatic disease.  - 1/5/20: Restaging CT CAP and NM Bone Scan showed stable osseous disease, no evidence of recurrent or new metastatic disease.  - 4/6/20: NM bone scan with slightly improved uptake in known skeletal mets. CT C/A/P with contrast with stable osseous mets, no yusuf enlargement, no new visceral mets etc.  - 04/08/20: Zometa every 3 months.  - 10/5/20: CT C/A/P with contrast and NM bone scan - SD.   - 1/4/21: CT C/A/P with contrast and NM bone scan - SD but slight increase in right 5th rib tracer uptake and in posterior L5 sclerosis.   - 03/29/21: CT C/A/P with contrast - single new right sacral 8mm bone lesion (suspicious), other bone mets stable. No visceral/yuusf disease. Nephrectomy site without recurrence. NM bone scan - SD but \"increased uptake associated with the prior lesions of the lower  cervical spine, posterior right fourth rib and right L5 posterior arch elements. Otherwise unchanged uptake associated with the proximal left femur and left anterior fourth rib. Similar uptake of the left halux, possibly secondary to degenerative osteoarthritis or gout.\"    1/6/21  PSA = 0.29  3/31/21  PSA = 0.44  7/7/21  PSA = 0.47  7/7/21-10 fx of RT at C7. Tolerating well. Dr Lizarraga decided to not radiate the prostate.  He is tolerating the RT well.  He has no radicular symptoms. " "    10/7/21-CARIS testing from biopsy 6/2019 without targetable lesions.  Overall reads limited by insufficient or poor sample quality of specimen.     11/15/21-New sclerotic lesions noted on restaging CT CAP and confirmed on NM bone scan with new T9, 4th rib and sacral ala. PSA increased to 1.05, now PSADT of 3.4 months.  Plan to start enzalutamide and continue ADT.  He is interested in enrolling on cognitive function study (COGCAP) and is pending screening.      2. Stage III ([id number]), grade 3 of 4, clear-cell RCC of right kidney:  - Incidentally diagnosed as above.   - Underwent curative-intent robotic right radical nephrectomy with Dr. Wesley Cleveland on 3/19/19. No tumor spillage per op report.   - Path showed: \"Histologic Type: Clear cell renal cell carcinoma; Sarcomatoid Features: Not identified; Histologic Grade: Nucleolar grade 3 (WHO/ISUP). Extent Tumor Size: 4.3 cm. Microscopic Tumor Extension: Tumor extension into renal sinus (in vascular structures). Margins: Negative. Tumor Necrosis: Present; focal. Lymph-Vascular Invasion: Not identified.  Pathologic Staging (pTNM) Primary Tumor (pT): pT3a: Tumor extends into renal vein branches/renal sinus. Regional Lymph Nodes (pN): pNX. Number of Lymph Nodes Examined: 0 Distant Metastasis (pM): pM N/A.\"  -11/11/21-Restaging CT CAP without concerning signs of recurrence.     Interval History:  Feeling OK overall.  Throwing back out a bit more frequently, but gentle adjustment with chiropractor are very helpful.  No new pains.  Hot flashes are at baseline.  Energy is about the same as before.  Having nocturia if he doesn't take flomax early enough in the evening, but no major issues with nocturia if taken early evening.  Denies other complaints at this time.      PAST MEDICAL HISTORY:  Past Medical History:   Diagnosis Date     Cancer of kidney, right (H)      Complication of anesthesia     slow wakeup      Malignant neoplasm of prostate metastatic to bone (H) " 2/14/2019     Thyroid disease      PAST SURGICAL HISTORY:   Past Surgical History:   Procedure Laterality Date     CYSTOSCOPY      about 10 years ago     INSERT PORT VASCULAR ACCESS Right 5/2/2019    Procedure: Chest Port Placement;  Surgeon: Tate Burciaga PA-C;  Location: [facility] OR     IR CHEST PORT PLACEMENT > 5 YRS OF AGE  5/2/2019     LAPAROSCOPIC NEPHRECTOMY Right 3/19/2019    Procedure: Right laparoscopic radical nephrectomy;  Surgeon: Wesley Cleveland MD;  Location: [facility] OR      SOCIAL HISTORY:   Social History     Tobacco Use     Smoking status: Never Smoker     Smokeless tobacco: Never Used   Substance Use Topics     Alcohol use: Not on file     Comment: wine - very rare     Drug use: No     FAMILY HISTORY:   Family History   Problem Relation Age of Onset     Diabetes Father      ALLERGIES:   Allergies   Allergen Reactions     Doxycycline GI Disturbance     CURRENT MEDICATIONS:   Current Outpatient Medications:      amLODIPine (NORVASC) 10 MG tablet, Take 1 tablet (10 mg) by mouth daily, Disp: 30 tablet, Rfl: 0     atorvastatin (LIPITOR) 20 MG tablet, Take 60 mg by mouth daily , Disp: , Rfl:      calcium citrate-vitamin D (CITRACAL) 315-250 MG-UNIT TABS per tablet, Take 650 mg by mouth 2 times daily , Disp: , Rfl:      cetirizine (ZYRTEC) 10 MG tablet, Take 10 mg by mouth as needed for allergies , Disp: , Rfl:      cycloSPORINE (RESTASIS) 0.05 % ophthalmic emulsion, Place 1 drop into both eyes 2 times daily , Disp: , Rfl:      doxycycline hyclate (VIBRA-TABS) 100 MG tablet, Take 100 mg by mouth daily Daily every other month, Disp: , Rfl:      fluticasone (FLONASE) 50 MCG/ACT nasal spray, Spray 2 sprays in nostril daily as needed , Disp: , Rfl:      Glucosamine-Chondroit-Vit C-Mn (GLUCOSAMINE 1500 COMPLEX) CAPS, Take 1 capsule by mouth daily, Disp: , Rfl:      levothyroxine (SYNTHROID/LEVOTHROID) 100 MCG tablet, Take 100 mcg by mouth daily, Disp: , Rfl:      loratadine (CLARITIN) 10 MG tablet,  , Disp: , Rfl:      MAGNESIUM PO, Take 250 mg by mouth 2 times daily , Disp: , Rfl:      MEBENDAZOLE PO, Take 225 mg by mouth daily , Disp: , Rfl:      metFORMIN (GLUCOPHAGE) 500 MG tablet, Take 1,500 mg by mouth 2 times daily (with meals) 500 mg 1000 at PM, Disp: , Rfl:      Multiple Vitamins-Minerals (MULTIVITAMIN ADULT EXTRA C PO), Take 1 tablet by mouth every 24 hours, Disp: , Rfl:      Nutritional Supplements (SALMON OIL) CAPS, Take 2 capsules by mouth daily , Disp: , Rfl:      omeprazole (PRILOSEC) 20 MG DR capsule, Take 20 mg by mouth daily, Disp: , Rfl:      tamsulosin (FLOMAX) 0.4 MG capsule, Take 1 capsule (0.4 mg) by mouth daily, Disp: 30 capsule, Rfl: 3    Current Facility-Administered Medications:      heparin 100 UNIT/ML injection 5 mL, 5 mL, Intracatheter, Daily PRN, Jj Winters MD, 5 mL at 11/15/21 0837     leuprolide (LUPRON DEPOT) kit 22.5 mg, 22.5 mg, Intramuscular, Q90 Days, Jj Winters MD    PHYSICAL EXAMINATION:  VITALS: /75   Pulse 61   Temp 98  F (36.7  C) (Oral)   Resp 16   Wt 111.9 kg (246 lb 12.8 oz)   SpO2 96%   BMI 33.47 kg/m       ECOG PS 1.    General: Patient appears well in no acute distress.   Skin: No visualized rash or lesions on visualized skin  Eyes: EOMI, no erythema, sclera icterus or discharge noted  Resp: Appears to be breathing comfortably without accessory muscle usage, speaking in full sentences, no cough  MSK: Appears to have normal range of motion based on visualized movements  Neurologic: No apparent tremors, facial movements symmetric  Psych: affect normal, alert and oriented    LABORATORY DATA/IMAGING:     I personally reviewed labs and imaging today     CT CAP 11/11/21                                                               IMPRESSION:   1. New sclerotic lesions in the pelvic bones consistent with  metastases(likely from prostate). Increased size of sclerotic  metastases in the sacrum, L5, and right fifth rib. The sclerotic  lesions in  the left pelvic bone and left femoral neck appear stable.  2. Increased sclerosis of C7 metastasis, presumed postradiation  changes.  3. Stable appearance of the prostate compared to 6/30/2021.  4. No evidence of non-osseous metastatic disease in the chest,  abdomen, and pelvis.  5. Changes of right nephrectomy, without suspicious nodules or masses  in the nephrectomy bed.  6. Stable large hiatal hernia.    NM Bones scan 11/11/21  FINDINGS: There has been interval progression of the metastatic  disease with a new lesion involving the right T9 transverse process,  greater involvement of the right posterior approximate fourth rib, and  involvement of the sacral body and ala.     Stable degenerative changes of patella and tibial tuberosities.                                                                      IMPRESSION: Interval progression of the metastatic disease as  described above.     CARIS testing 10/2021 from sternal biopsy        PSA 1.05 11/15/21  Last Comprehensive Metabolic Panel:  Sodium   Date Value Ref Range Status   11/15/2021 144 133 - 144 mmol/L Final   07/07/2021 139 133 - 144 mmol/L Final     Potassium   Date Value Ref Range Status   11/15/2021 4.0 3.4 - 5.3 mmol/L Final   07/07/2021 4.1 3.4 - 5.3 mmol/L Final     Chloride   Date Value Ref Range Status   11/15/2021 106 94 - 109 mmol/L Final   07/07/2021 107 94 - 109 mmol/L Final     Carbon Dioxide   Date Value Ref Range Status   07/07/2021 25 20 - 32 mmol/L Final     Carbon Dioxide (CO2)   Date Value Ref Range Status   11/15/2021 27 20 - 32 mmol/L Final     Anion Gap   Date Value Ref Range Status   11/15/2021 11 3 - 14 mmol/L Final   07/07/2021 7 3 - 14 mmol/L Final     Glucose   Date Value Ref Range Status   11/15/2021 102 (H) 70 - 99 mg/dL Final   07/07/2021 105 (H) 70 - 99 mg/dL Final     Urea Nitrogen   Date Value Ref Range Status   11/15/2021 17 7 - 30 mg/dL Final   07/07/2021 15 7 - 30 mg/dL Final     Creatinine   Date Value Ref Range Status  "  11/15/2021 0.92 0.66 - 1.25 mg/dL Final   07/07/2021 0.99 0.66 - 1.25 mg/dL Final     GFR Estimate   Date Value Ref Range Status   11/15/2021 >90 >60 mL/min/1.73m2 Final     Comment:     As of July 11, 2021, eGFR is calculated by the CKD-EPI creatinine equation, without race adjustment. eGFR can be influenced by muscle mass, exercise, and diet. The reported eGFR is an estimation only and is only applicable if the renal function is stable.   07/07/2021 82 >60 mL/min/[1.73_m2] Final     Comment:     Non  GFR Calc  Starting 12/18/2018, serum creatinine based estimated GFR (eGFR) will be   calculated using the Chronic Kidney Disease Epidemiology Collaboration   (CKD-EPI) equation.       Calcium   Date Value Ref Range Status   11/15/2021 8.9 8.5 - 10.1 mg/dL Final   07/07/2021 8.9 8.5 - 10.1 mg/dL Final     Bilirubin Total   Date Value Ref Range Status   11/15/2021 0.4 0.2 - 1.3 mg/dL Final   07/07/2021 0.7 0.2 - 1.3 mg/dL Final     Alkaline Phosphatase   Date Value Ref Range Status   11/15/2021 128 40 - 150 U/L Final   07/07/2021 70 40 - 150 U/L Final     ALT   Date Value Ref Range Status   11/15/2021 32 0 - 70 U/L Final   07/07/2021 25 0 - 70 U/L Final     AST   Date Value Ref Range Status   11/15/2021 19 0 - 45 U/L Final   07/07/2021 17 0 - 45 U/L Final     ASSESSMENT & PLAN: Mr. Reyes is a 61 year old gentleman with recently diagnosed metastatic castration sensitive prostate adenocarcinoma as well as an incidentally diagnosed stage III clear-cell renal cell carcinoma of the right kidney (s/p radical R nephrectomy 3/19/19), who is here for a follow-up visit after completion of docetaxel for prostate cancer.     1. Metastatic prostate cancer, with involvement of the bones and RPLN:   - Patient met the criteria for ARTUROED \"high-volume\" metastatic hormone-sensitive prostate cancer. He opted for docetaxel in combination with ADT (per SHAVONNE [id number]-[id number], KARLA). Tolerated 6 cycles of " docetaxel fairly well (5/8/19 through 8/21/19), with the exception of mild fatigue and mild neuropathy. Now on ADT and tolerating fairly well.  - Reviewed restaging CT CAP and NM Bone Scan (acutal images) from 6/30/21. Does note some progression of bone lesions, though with frequent bone scans, this can show some more variance than actual disease progression. Dr. Winters joined part of the visit and discussion. Since he has not had prostate directed therapies, will refer to RT for potential prostate RT as this could help LUT symptoms as well, but on RT consult, prostate was not recommended for radiation. He does have a large lesion in the C spine, though he is asymptomatic. Will get MRI of c spine to assess and then could have RT to this lesion as well, for which he received 10 doses 7/2021. Given his rapid PSADT of 3.4 months at 11/15/21 visit, we discussed intiation of enzalutamide for now hormone insensitive disease.  We have placed order for this and he can start whenever it is available unless there are restrictions with enrolling on enzalutamide cognition study.    - Continue leuprolide 22.5mg every 3 months, next due 2/15/22.   -CARIS testing limited by sample/read quality, with no targetable abnormalities found on limited study.   -could consider Guardant 360 as well given rise in PSA.   -continue zometa until April of 2022 ( two full years of therapy)-next dose due 2/15/22.   -Will start enzalutamide when approved.   -Pending enrollment screen for [facility].      2. Bone metastases:   - Noted to have osseous metastatic disease at the time of diagnosis. Worsening mets seen on imaging today by NM bone scan.    - Encouraged to continue calcium and vitamin D supplementation.  - Will continue Zometa 4mg IV every 3 months, will get dose today (11/15/21).  Next dose due 2/15/21.  Reassess for continued therapy in April 2022.     3. Stage 3, UISS-high risk ccRCC:  - approximately 40-50% risk of recurrence after  definitive surgery.   - Continue active surveillance with self-reporting for signs/symptoms of recurrence (this was previously explained in detail) and periodic H&P and scans.   -CT CAP for restaging from 11/2021 without evidence for distant or local recurrence per formal radiology read.      4. COVID - recovered but still needs to be vaccinated! Encouraged.     Patient seen and discussed with Dr. Winters.     Omar Mcnair MD, PhD  Hematology/Oncology Fellow  Pager: [phone]          Attestation signed by Jj Winters MD at 11/21/2021  7:48 PM:  Physician Attestation   I, Jj Winters MD, saw this patient and agree with the findings and plan of care as documented in the note.      Items personally reviewed/procedural attestation: vitals, labs, and plan.    Jj Winters MD

## 2021-11-15 NOTE — TELEPHONE ENCOUNTER
PA Initiation    Medication: Xtandi  Insurance Company: [facility] - Phone [phone] Fax [phone]  Pharmacy Filling the Rx: [facility] - [place], [place] - [facility] [address]  Filling Pharmacy Phone:    Filling Pharmacy Fax:    Start Date: 11/15/2021

## 2021-11-15 NOTE — NURSING NOTE
Oncology Rooming Note    November 15, 2021 8:51 AM   Walter Reyes is a 61 year old male who presents for:    Chief Complaint   Patient presents with     Port Draw     Labs drawn via port by RN in lab. VS taken      Initial Vitals: Blood Pressure 124/75   Pulse 61   Temperature 98  F (36.7  C) (Oral)   Respiration 16   Weight 111.9 kg (246 lb 12.8 oz)   Oxygen Saturation 96%   Body Mass Index 33.47 kg/m   Estimated body mass index is 33.47 kg/m  as calculated from the following:    Height as of 8/4/20: 1.829 m (6').    Weight as of this encounter: 111.9 kg (246 lb 12.8 oz). Body surface area is 2.38 meters squared.  No Pain (0) Comment: Data Unavailable   No LMP for male patient.  Allergies reviewed: Yes  Medications reviewed: Yes    Medications: Medication refills not needed today.  Pharmacy name entered into EPIC:    [facility] [place], [place] - [address] [id number] - [place], [place] - [address]    Clinical concerns: none       Beata Muhammad MA

## 2021-11-16 ENCOUNTER — TELEPHONE (OUTPATIENT)
Dept: ONCOLOGY | Facility: CLINIC | Age: 61
End: 2021-11-16
Payer: COMMERCIAL

## 2021-11-16 DIAGNOSIS — C79.51 MALIGNANT NEOPLASM OF PROSTATE METASTATIC TO BONE (H): Primary | ICD-10-CM

## 2021-11-16 DIAGNOSIS — C61 MALIGNANT NEOPLASM OF PROSTATE METASTATIC TO BONE (H): Primary | ICD-10-CM

## 2021-11-16 NOTE — TELEPHONE ENCOUNTER
Oral Chemotherapy Monitoring Program    Primary Oncologist: Dr Jj Winters  Primary Oncology Clinic: [facility]  Cancer Diagnosis: Prostate cancer    Drug: Xtandi 160mg (4x40mg) PO daily  Start Date: as soon as available  Dose is appropriate for patients: Renal Function and Hepatic Function   Expected duration of therapy: Until disease progression or unacceptable toxicity    Drug Interaction Assessment: Xtandi + amlodipine/atorvastatin = C; Xtandi (CY strong inducer) may decrease the serum concentration of amlodipine/atorvastatin. Need to monitor BP.   - Xtandi + omeprazole = C; Xtandi ([id number] moderate inducer) may decrease the serum concentration of omeprazole. Watch for decreased efficacy of therapy.   Medication list checked (11/15): -Xtandi -AmLODIPine -AtorvaSTATin -Calcium Carbonate and Vitamin D -Cetirizine -CycloSPORINE (Ophthalmic) -Doxycycline -Flonase Allergy Relief -Glucosamine -Chondroitin Sulfate -Levothyroxine -Loratadine -Magnesium Oxide -Mebendazole -MetFORMIN -Multivitamins/Minerals -Omeprazole -Tamsulosin     Lab Monitoring Plan: CBC/CMP monthly x 3, then Q2 months    Subjective/Objective:  Walter Reyes is a 61 year old male contacted by phone for an initial visit for oral chemotherapy education.      ORAL CHEMOTHERAPY 11/15/2021 2021   Assessment Type Initial Work up New Teach   Diagnosis Code Prostate Cancer Prostate Cancer   Providers Dr. Singh Winters   Clinic Name/Location [facility]   Drug Name Xtandi (enzalutamide) Xtandi (enzalutamide)   Dose 160 mg 160 mg   Current Schedule Daily Daily   Cycle Details Continuous Continuous   Any new drug interactions? Yes -   Pharmacist Intervention? Yes -   Intervention(s) Patient Education -   Is the dose as ordered appropriate for the patient? Yes -         Vitals:  BP:   BP Readings from Last 1 Encounters:   11/15/21 124/75     Wt Readings from Last 1 Encounters:   11/15/21 111.9 kg (246 lb 12.8 oz)     Estimated  body surface area is 2.38 meters squared as calculated from the following:    Height as of 8/4/20: 1.829 m (6').    Weight as of 11/15/21: 111.9 kg (246 lb 12.8 oz).      Labs:  _  Result Component Current Result Ref Range   Sodium 144 (11/15/2021) 133 - 144 mmol/L     _  Result Component Current Result Ref Range   Potassium 4.0 (11/15/2021) 3.4 - 5.3 mmol/L     _  Result Component Current Result Ref Range   Calcium 8.9 (11/15/2021) 8.5 - 10.1 mg/dL     No results found for Mag within last 30 days.     No results found for Phos within last 30 days.     _  Result Component Current Result Ref Range   Albumin 3.7 (11/15/2021) 3.4 - 5.0 g/dL     _  Result Component Current Result Ref Range   Urea Nitrogen 17 (11/15/2021) 7 - 30 mg/dL     _  Result Component Current Result Ref Range   Creatinine 0.92 (11/15/2021) 0.66 - 1.25 mg/dL       _  Result Component Current Result Ref Range   AST 19 (11/15/2021) 0 - 45 U/L     _  Result Component Current Result Ref Range   ALT 32 (11/15/2021) 0 - 70 U/L     _  Result Component Current Result Ref Range   Bilirubin Total 0.4 (11/15/2021) 0.2 - 1.3 mg/dL       Assessment:  Patient is appropriate to start therapy.    Plan:  Basic chemotherapy teaching was reviewed with the patient including indication, start date of therapy, dose, administration, adverse effects, missed doses, food and drug interactions, monitoring, side effect management, office contact information, and safe handling. Written materials were provided and all questions answered.    Follow-Up:  Will call patient about a week after starting the medication.    Micheline Grace, PharmD, BCPS, BCOP  Oncology Clinical Pharmacy Specialist  [facility]  [phone]

## 2021-11-16 NOTE — TELEPHONE ENCOUNTER
Prior Authorization Approval    Authorization Effective Date: 11/15/2021  Authorization Expiration Date: 11/15/2022  Medication: Xtandi- approved  Approved Dose/Quantity: #120 / 30 ds  (must fill 15 ds for 1-3 months)  Reference #: [id number]   Insurance Company: [employer] [place] - Phone [phone] Fax [phone]  Expected CoPay: $0     CoPay Card Available:      Foundation Assistance Needed:    Which Pharmacy is filling the prescription (Not needed for infusion/clinic administered): [facility] - [place], [place] - [address]  Pharmacy Notified: Yes  Patient Notified: Yes

## 2021-11-23 ENCOUNTER — RESEARCH ENCOUNTER (OUTPATIENT)
Dept: ONCOLOGY | Facility: CLINIC | Age: 61
End: 2021-11-23
Payer: COMMERCIAL

## 2021-11-23 NOTE — PROGRESS NOTES
[id number]: Informed Consent Note     The consent form, including purpose, risks and benefits, was reviewed with Walter Reyes, and all questions were answered before he signed the consent form. The patient understands that the study involves an active treatment phase as well as a post-treatment follow up phase.     Present during the discussion were myself, Walter, and Gricel Fleming ([facility]). A copy of the signed form was provided to the patient. No procedures specific to this study were performed prior to the patient signing the consent form.    Consent Version Date: 03May2021  Consent obtained by: Chelsea Dang    Date: 11/23/2021  HIPAA authorization signed?: yes  HIPAA authorization version date: 10JAN2020    Chelsea Dang  Clinical Research Coordinator  [phone]    Form 503.03.01 (Version 2)     Effective date: 01AUG2018     Next Review Date: 01AUG2020 [id number]: Study Visit Note   Subject name: Walter Reyes     Visit: Baseline    Did the study visit occur within the appropriate window allowed by the protocol? yes    If no, why? NA    I have personally interviewed Walter Reyes and reviewed his medical record for concomitant medications and these have been recorded on the corresponding logs in Walter Reyes's research file.     Walter Reyes was given the opportunity to ask any trial related questions.      Chelsea Dang  Clinical Research Coordinator  [phone]

## 2021-11-26 ENCOUNTER — TELEPHONE (OUTPATIENT)
Dept: ONCOLOGY | Facility: CLINIC | Age: 61
End: 2021-11-26
Payer: COMMERCIAL

## 2021-11-26 NOTE — TELEPHONE ENCOUNTER
Oral Chemotherapy Monitoring Program    Subjective/Objective:  Walter Reyes is a 61 year old male contacted by phone for a follow-up visit for oral chemotherapy.  Pt confirms taking the appropriate dose of Xtandi, 160mg (4x40mg) daily starting 11/17/21. Denies missed doses, and recent hospital or ED visits. Patient has not had any recent medication changes.     Pt states he has been experiencing more heartburn since starting Xtandi, as he is also on omeprazole (reduced effectiveness of omeprazole with Xtandi). Pt states he has been more tired, but he combats this by exercising in the evenings, which has been effective. Pt had some constipation right when he started the Xtandi, but this has since resolved.  Pt has had some muscle pain in his glute and his left ankle, both of which are improving.      ORAL CHEMOTHERAPY 11/15/2021 11/16/2021 11/26/2021   Assessment Type Initial Work up New Teach Initial Follow up   Diagnosis Code Prostate Cancer Prostate Cancer Prostate Cancer   Providers Dr. Singh Winters   [facility]   Drug Name Xtandi (enzalutamide) Xtandi (enzalutamide) Xtandi (enzalutamide)   Dose 160 mg 160 mg 160 mg   Current Schedule Daily Daily Daily   Cycle Details Continuous Continuous Continuous   Start Date of Last Cycle - - 11/17/2021   Doses missed in last 2 weeks - - 0   Adherence Assessment - - Adherent   Adverse Effects - - Fatigue   Fatigue - - Grade 1   Pharmacist Intervention(fatigue) - - No   Any new drug interactions? Yes - No   Pharmacist Intervention? Yes - -   Intervention(s) Patient Education - -   Is the dose as ordered appropriate for the patient? Yes - Yes   Since the last time we talked, have you been hospitalized or used the emergency room? - - No       Last PHQ-2 Score on record:   PHQ-2 ( 1999 Pfizer) 2/3/2020 7/11/2019   Q1: Little interest or pleasure in doing things 0 0   Q2: Feeling down, depressed or hopeless 0 0   PHQ-2 Score  0 0   PHQ-2 Total Score (12-17 Years)- Positive if 3 or more points; Administer PHQ-A if positive 0 0   Q1: Little interest or pleasure in doing things Not at all -   Q2: Feeling down, depressed or hopeless Not at all -   PHQ-2 Score 0 -       Vitals:  BP:   BP Readings from Last 1 Encounters:   11/15/21 124/75     Wt Readings from Last 1 Encounters:   11/15/21 111.9 kg (246 lb 12.8 oz)     Estimated body surface area is 2.38 meters squared as calculated from the following:    Height as of 8/4/20: 1.829 m (6').    Weight as of 11/15/21: 111.9 kg (246 lb 12.8 oz).    Labs:  _  Result Component Current Result Ref Range   Sodium 144 (11/15/2021) 133 - 144 mmol/L     _  Result Component Current Result Ref Range   Potassium 4.0 (11/15/2021) 3.4 - 5.3 mmol/L     _  Result Component Current Result Ref Range   Calcium 8.9 (11/15/2021) 8.5 - 10.1 mg/dL     No results found for Mag within last 30 days.     No results found for Phos within last 30 days.     _  Result Component Current Result Ref Range   Albumin 3.7 (11/15/2021) 3.4 - 5.0 g/dL     _  Result Component Current Result Ref Range   Urea Nitrogen 17 (11/15/2021) 7 - 30 mg/dL     _  Result Component Current Result Ref Range   Creatinine 0.92 (11/15/2021) 0.66 - 1.25 mg/dL     _  Result Component Current Result Ref Range   AST 19 (11/15/2021) 0 - 45 U/L     _  Result Component Current Result Ref Range   ALT 32 (11/15/2021) 0 - 70 U/L     _  Result Component Current Result Ref Range   Bilirubin Total 0.4 (11/15/2021) 0.2 - 1.3 mg/dL     No results found for WBC within last 30 days.     No results found for HGB within last 30 days.     No results found for PLT within last 30 days.     No results found for ANC within last 30 days.     Assessment/Plan:  Pt is tolerating Xtandi well so far with some fatigue.  Recommended patient try famotidine for heartburn instead of omeprazole. Pt to continue to monitor the muscle pain, unlikely to be related to the Xtandi.    Pt to  schedule monthly labs at [facility].    Follow-Up:  12/15: Look for monthly labs    Grace Myhre, PharmD  Hematology/Oncology Pharmacist  [facility]  [phone]

## 2021-12-01 DIAGNOSIS — C61 PROSTATE CANCER (H): Primary | ICD-10-CM

## 2021-12-03 ENCOUNTER — RESEARCH ENCOUNTER (OUTPATIENT)
Dept: ONCOLOGY | Facility: CLINIC | Age: 61
End: 2021-12-03
Payer: COMMERCIAL

## 2021-12-06 NOTE — PROGRESS NOTES
[id number]: Study Visit Note   Subject name: Walter Reyes     Visit: Baseline    Did the study visit occur within the appropriate window allowed by the protocol? yes    If no, why? N/A    Called Micheline, Walter's wife, to go through the caregiver questionnaire. Caregiver questionnaire was completed.        Chelsea Dang  Clinical Research Coordinator   [phone]

## 2021-12-10 ENCOUNTER — TELEPHONE (OUTPATIENT)
Dept: ONCOLOGY | Facility: CLINIC | Age: 61
End: 2021-12-10
Payer: COMMERCIAL

## 2021-12-10 DIAGNOSIS — C61 MALIGNANT NEOPLASM OF PROSTATE METASTATIC TO BONE (H): Primary | ICD-10-CM

## 2021-12-10 DIAGNOSIS — C79.51 MALIGNANT NEOPLASM OF PROSTATE METASTATIC TO BONE (H): Primary | ICD-10-CM

## 2021-12-10 NOTE — TELEPHONE ENCOUNTER
"Oral Chemotherapy Monitoring Program     Spoke to Walter about some AEs with Xtandi. He is noticing muscle pain primarily in his right glute, he did alert his PCP and seek chiropractic care. We discussed myalgias as a possible AE from Xtandi, he will continue to monitor. He had one or two days of constipation followed by diarrhea but this has normalized. He is having some more vivid dreams, but they are not nightmares and not bothersome. He noticed for a few days he was \"in a funk\" and didn't have the motivation to do his normal tasks. Today he is feeling like himself again. He wonders if this mood change could be an AE - we discussed potential anxiety or cognitive dysfunction in detail. He will alert us if his mood changes again.     Emilee Choi, PharmD  Hematology/Oncology Clinical Pharmacist  [facility]  [facility]          "

## 2021-12-16 ENCOUNTER — LAB (OUTPATIENT)
Dept: LAB | Facility: CLINIC | Age: 61
End: 2021-12-16
Payer: COMMERCIAL

## 2021-12-16 DIAGNOSIS — C61 MALIGNANT NEOPLASM OF PROSTATE METASTATIC TO BONE (H): ICD-10-CM

## 2021-12-16 DIAGNOSIS — C79.51 MALIGNANT NEOPLASM OF PROSTATE METASTATIC TO BONE (H): ICD-10-CM

## 2021-12-16 LAB
BASOPHILS # BLD AUTO: 0 10E3/UL (ref 0–0.2)
BASOPHILS NFR BLD AUTO: 0 %
EOSINOPHIL # BLD AUTO: 0.2 10E3/UL (ref 0–0.7)
EOSINOPHIL NFR BLD AUTO: 3 %
ERYTHROCYTE [DISTWIDTH] IN BLOOD BY AUTOMATED COUNT: 13.9 % (ref 10–15)
HCT VFR BLD AUTO: 41 % (ref 40–53)
HGB BLD-MCNC: 13.5 G/DL (ref 13.3–17.7)
LYMPHOCYTES # BLD AUTO: 2.3 10E3/UL (ref 0.8–5.3)
LYMPHOCYTES NFR BLD AUTO: 33 %
MCH RBC QN AUTO: 29.9 PG (ref 26.5–33)
MCHC RBC AUTO-ENTMCNC: 32.9 G/DL (ref 31.5–36.5)
MCV RBC AUTO: 91 FL (ref 78–100)
MONOCYTES # BLD AUTO: 0.6 10E3/UL (ref 0–1.3)
MONOCYTES NFR BLD AUTO: 8 %
NEUTROPHILS # BLD AUTO: 4 10E3/UL (ref 1.6–8.3)
NEUTROPHILS NFR BLD AUTO: 57 %
PLATELET # BLD AUTO: 208 10E3/UL (ref 150–450)
RBC # BLD AUTO: 4.52 10E6/UL (ref 4.4–5.9)
WBC # BLD AUTO: 7.1 10E3/UL (ref 4–11)

## 2021-12-16 PROCEDURE — 80053 COMPREHEN METABOLIC PANEL: CPT

## 2021-12-16 PROCEDURE — 84153 ASSAY OF PSA TOTAL: CPT

## 2021-12-16 PROCEDURE — 85025 COMPLETE CBC W/AUTO DIFF WBC: CPT

## 2021-12-16 PROCEDURE — 36415 COLL VENOUS BLD VENIPUNCTURE: CPT

## 2021-12-17 ENCOUNTER — TELEPHONE (OUTPATIENT)
Dept: PHARMACY | Facility: CLINIC | Age: 61
End: 2021-12-17
Payer: COMMERCIAL

## 2021-12-17 LAB
ALBUMIN SERPL-MCNC: 3.9 G/DL (ref 3.4–5)
ALP SERPL-CCNC: 89 U/L (ref 40–150)
ALT SERPL W P-5'-P-CCNC: 33 U/L (ref 0–70)
ANION GAP SERPL CALCULATED.3IONS-SCNC: 7 MMOL/L (ref 3–14)
AST SERPL W P-5'-P-CCNC: 21 U/L (ref 0–45)
BILIRUB SERPL-MCNC: 0.3 MG/DL (ref 0.2–1.3)
BUN SERPL-MCNC: 21 MG/DL (ref 7–30)
CALCIUM SERPL-MCNC: 9.4 MG/DL (ref 8.5–10.1)
CHLORIDE BLD-SCNC: 106 MMOL/L (ref 94–109)
CO2 SERPL-SCNC: 25 MMOL/L (ref 20–32)
CREAT SERPL-MCNC: 1.07 MG/DL (ref 0.66–1.25)
GFR SERPL CREATININE-BSD FRML MDRD: 76 ML/MIN/1.73M2
GLUCOSE BLD-MCNC: 87 MG/DL (ref 70–99)
POTASSIUM BLD-SCNC: 4.4 MMOL/L (ref 3.4–5.3)
PROT SERPL-MCNC: 7.6 G/DL (ref 6.8–8.8)
PSA SERPL-MCNC: 0.22 UG/L (ref 0–4)
SODIUM SERPL-SCNC: 138 MMOL/L (ref 133–144)

## 2021-12-17 NOTE — ORAL ONC MGMT
Oral Chemotherapy Monitoring Program    Subjective/Objective:  Walter Reyes is a 61 year old male contacted by phone for a follow-up visit for oral chemotherapy. Confirmed the correct dose. Patient denies any new/worsening side effects, missed doses, or medication changes. We reviewed his labs which are all stable. PSA is coming down nicely which he was very happy to see.     ORAL CHEMOTHERAPY 11/15/2021 11/16/2021 11/26/2021 12/10/2021 12/10/2021 12/17/2021   Assessment Type Initial Work up New Teach Initial Follow up Refill Incoming phone call Lab Monitoring;Monthly Follow up   Diagnosis Code Prostate Cancer Prostate Cancer Prostate Cancer Prostate Cancer Prostate Cancer Prostate Cancer   Providers Dr. Singh Winters   [facility]   Drug Name Xtandi (enzalutamide) Xtandi (enzalutamide) Xtandi (enzalutamide) Xtandi (enzalutamide) Xtandi (enzalutamide) Xtandi (enzalutamide)   Dose 160 mg 160 mg 160 mg 160 mg 160 mg 160 mg   Current Schedule Daily Daily Daily Daily Daily Daily   Cycle Details Continuous Continuous Continuous Continuous Continuous Continuous   Start Date of Last Cycle - - 11/17/2021 - - 12/17/2021   Planned next cycle start date - - - - - 1/16/2022   Doses missed in last 2 weeks - - 0 - - 0   Adherence Assessment - - Adherent - - Adherent   Adverse Effects - - Fatigue - - No AE identified during assessment   Fatigue - - Grade 1 - - -   Pharmacist Intervention(fatigue) - - No - - -   Home BPs - - - - - all BPs<140/90   Any new drug interactions? Yes - No - - No   Pharmacist Intervention? Yes - - - - -   Intervention(s) Patient Education - - - - -   Is the dose as ordered appropriate for the patient? Yes - Yes - - Yes   Has the patient missed any days of school, work, or other routine activity? - - - - - No   Since the last time we talked, have you been hospitalized or used the emergency room? - - No -  - No       Last PHQ-2 Score on record:   PHQ-2 ( 1999 Pfizer) 2/3/2020 7/11/2019   Q1: Little interest or pleasure in doing things 0 0   Q2: Feeling down, depressed or hopeless 0 0   PHQ-2 Score 0 0   PHQ-2 Total Score (12-17 Years)- Positive if 3 or more points; Administer PHQ-A if positive 0 0   Q1: Little interest or pleasure in doing things Not at all -   Q2: Feeling down, depressed or hopeless Not at all -   PHQ-2 Score 0 -       Vitals:  BP:   BP Readings from Last 1 Encounters:   11/15/21 124/75     Wt Readings from Last 1 Encounters:   11/15/21 111.9 kg (246 lb 12.8 oz)     Estimated body surface area is 2.38 meters squared as calculated from the following:    Height as of 8/4/20: 1.829 m (6').    Weight as of 11/15/21: 111.9 kg (246 lb 12.8 oz).    Labs:  _  Result Component Current Result Ref Range   Sodium 138 (12/16/2021) 133 - 144 mmol/L     _  Result Component Current Result Ref Range   Potassium 4.4 (12/16/2021) 3.4 - 5.3 mmol/L     _  Result Component Current Result Ref Range   Calcium 9.4 (12/16/2021) 8.5 - 10.1 mg/dL     No results found for Mag within last 30 days.     No results found for Phos within last 30 days.     _  Result Component Current Result Ref Range   Albumin 3.9 (12/16/2021) 3.4 - 5.0 g/dL     _  Result Component Current Result Ref Range   Urea Nitrogen 21 (12/16/2021) 7 - 30 mg/dL     _  Result Component Current Result Ref Range   Creatinine 1.07 (12/16/2021) 0.66 - 1.25 mg/dL     _  Result Component Current Result Ref Range   AST 21 (12/16/2021) 0 - 45 U/L     _  Result Component Current Result Ref Range   ALT 33 (12/16/2021) 0 - 70 U/L     _  Result Component Current Result Ref Range   Bilirubin Total 0.3 (12/16/2021) 0.2 - 1.3 mg/dL     _  Result Component Current Result Ref Range   WBC Count 7.1 (12/16/2021) 4.0 - 11.0 10e3/uL     _  Result Component Current Result Ref Range   Hemoglobin 13.5 (12/16/2021) 13.3 - 17.7 g/dL     _  Result Component Current Result Ref Range    Platelet Count 208 (12/16/2021) 150 - 450 10e3/uL     No results found for ANC within last 30 days.         Assessment/Plan:  Tolerating therapy well. PSA downtrending nicely.     Follow-Up:  1/16: next lab appt    Refill Due:  1/10/22    Jonnie Daniel PharmD, MS  Hematology/Oncology Clinical Pharmacist  [facility]  [phone] (oral chemotherapy)  [phone] (infusion)

## 2021-12-28 ENCOUNTER — TELEPHONE (OUTPATIENT)
Dept: ONCOLOGY | Facility: CLINIC | Age: 61
End: 2021-12-28
Payer: COMMERCIAL

## 2021-12-28 NOTE — ORAL ONC MGMT
Oral Chemotherapy Monitoring Program    Subjective/Objective:  Walter Reyes is a 61 year old male contacted by phone for a follow-up visit for oral chemotherapy.  Walter said that overall things are going well with him and he is managing his Xtandi and other medications and symptoms well. He said he has been measuring his BP much more frequently since starting Xtandi and it is generally in the 120/80 range. There have been rare elevations in the morning but they have resolved when checked later in the day. He said that this past week he has experienced hot flashes and they are less frequent and shorter in duration than in the previous week. He feels this is manageable. He has also seen less severe restless leg syndrome last week than in previous week. He noted that last night it did not bother him at all. He is taking omeprazole and famotidine to control heartburn both in the afternoon and overnight which is generally working well for him provided he does not eat red sauces or eat too late in the day. This he feels he is managing well. He said he drinks at least 64 ounces of water per day and finds that hydrating earlier in the day helps reduce RLS symptoms. He does exercise up to 4 times per week on an exercise bike, though he noted this past week he did not exercise as much. He said he notices his energy is really improved with exercise.    ORAL CHEMOTHERAPY 11/15/2021 11/16/2021 11/26/2021 12/10/2021 12/10/2021 12/17/2021 12/28/2021   Assessment Type Initial Work up New Teach Initial Follow up Refill Incoming phone call Lab Monitoring;Monthly Follow up Initial Follow up   Diagnosis Code Prostate Cancer Prostate Cancer Prostate Cancer Prostate Cancer Prostate Cancer Prostate Cancer Prostate Cancer   Providers Dr. Singh Winters   [facility]   Drug Name Xtandi (enzalutamide) Xtandi (enzalutamide)  Xtandi (enzalutamide) Xtandi (enzalutamide) Xtandi (enzalutamide) Xtandi (enzalutamide) Xtandi (enzalutamide)   Dose 160 mg 160 mg 160 mg 160 mg 160 mg 160 mg 160 mg   Current Schedule Daily Daily Daily Daily Daily Daily Daily   Cycle Details Continuous Continuous Continuous Continuous Continuous Continuous Continuous   Start Date of Last Cycle - - 11/17/2021 - - 12/17/2021 -   Planned next cycle start date - - - - - 1/16/2022 -   Doses missed in last 2 weeks - - 0 - - 0 -   Adherence Assessment - - Adherent - - Adherent -   Adverse Effects - - Fatigue - - No AE identified during assessment Other (See Note for Details)   Fatigue - - Grade 1 - - - -   Pharmacist Intervention(fatigue) - - No - - - -   Pharmacist intervention(other) - - - - - - Yes   Intervention(s) - - - - - - Patient education   Home BPs - - - - - all BPs<140/90 -   Any new drug interactions? Yes - No - - No -   Pharmacist Intervention? Yes - - - - - -   Intervention(s) Patient Education - - - - - -   Is the dose as ordered appropriate for the patient? Yes - Yes - - Yes -   Has the patient missed any days of school, work, or other routine activity? - - - - - No -   Since the last time we talked, have you been hospitalized or used the emergency room? - - No - - No -       Last PHQ-2 Score on record:   PHQ-2 ( 1999 Pfizer) 2/3/2020 7/11/2019   Q1: Little interest or pleasure in doing things 0 0   Q2: Feeling down, depressed or hopeless 0 0   PHQ-2 Score 0 0   PHQ-2 Total Score (12-17 Years)- Positive if 3 or more points; Administer PHQ-A if positive 0 0   Q1: Little interest or pleasure in doing things Not at all -   Q2: Feeling down, depressed or hopeless Not at all -   PHQ-2 Score 0 -       Vitals:  BP:   BP Readings from Last 1 Encounters:   11/15/21 124/75     Wt Readings from Last 1 Encounters:   11/15/21 111.9 kg (246 lb 12.8 oz)     Estimated body surface area is 2.38 meters squared as calculated from the following:    Height as of 8/4/20: 1.829  m (6').    Weight as of 11/15/21: 111.9 kg (246 lb 12.8 oz).    Labs:  _  Result Component Current Result Ref Range   Sodium 138 (12/16/2021) 133 - 144 mmol/L     _  Result Component Current Result Ref Range   Potassium 4.4 (12/16/2021) 3.4 - 5.3 mmol/L     _  Result Component Current Result Ref Range   Calcium 9.4 (12/16/2021) 8.5 - 10.1 mg/dL     No results found for Mag within last 30 days.     No results found for Phos within last 30 days.     _  Result Component Current Result Ref Range   Albumin 3.9 (12/16/2021) 3.4 - 5.0 g/dL     _  Result Component Current Result Ref Range   Urea Nitrogen 21 (12/16/2021) 7 - 30 mg/dL     _  Result Component Current Result Ref Range   Creatinine 1.07 (12/16/2021) 0.66 - 1.25 mg/dL     _  Result Component Current Result Ref Range   AST 21 (12/16/2021) 0 - 45 U/L     _  Result Component Current Result Ref Range   ALT 33 (12/16/2021) 0 - 70 U/L     _  Result Component Current Result Ref Range   Bilirubin Total 0.3 (12/16/2021) 0.2 - 1.3 mg/dL     _  Result Component Current Result Ref Range   WBC Count 7.1 (12/16/2021) 4.0 - 11.0 10e3/uL     _  Result Component Current Result Ref Range   Hemoglobin 13.5 (12/16/2021) 13.3 - 17.7 g/dL     _  Result Component Current Result Ref Range   Platelet Count 208 (12/16/2021) 150 - 450 10e3/uL     No results found for ANC within last 30 days.         Assessment/Plan:  Walter continues to do well on Xtandi and is managing his AE with appropriate lifestyle modifications and medications. He is comfortable continuing with the plan and feels his quality of life is where it should be.    Follow-Up:  Pending labs in about two weeks.    Refill Due:  By 1/16/2022    Lopez Mathur PharmD  [facility]  [phone]  December 28, 2021

## 2022-01-03 DIAGNOSIS — C61 MALIGNANT NEOPLASM OF PROSTATE METASTATIC TO BONE (H): Primary | ICD-10-CM

## 2022-01-03 DIAGNOSIS — C79.51 MALIGNANT NEOPLASM OF PROSTATE METASTATIC TO BONE (H): Primary | ICD-10-CM

## 2022-01-10 ENCOUNTER — TELEPHONE (OUTPATIENT)
Dept: ONCOLOGY | Facility: CLINIC | Age: 62
End: 2022-01-10
Payer: COMMERCIAL

## 2022-01-10 DIAGNOSIS — C79.51 MALIGNANT NEOPLASM OF PROSTATE METASTATIC TO BONE (H): Primary | ICD-10-CM

## 2022-01-10 DIAGNOSIS — C61 MALIGNANT NEOPLASM OF PROSTATE METASTATIC TO BONE (H): Primary | ICD-10-CM

## 2022-01-10 NOTE — TELEPHONE ENCOUNTER
PA Initiation    Medication: Xtandi  Insurance Company: [employer] ([facility]) - Phone [phone] Fax [phone]  Pharmacy Filling the Rx: [facility] - [place], [place] - [address]  Filling Pharmacy Phone:    Filling Pharmacy Fax:    Start Date: 1/10/2022

## 2022-01-10 NOTE — TELEPHONE ENCOUNTER
Prior Authorization Approval    Authorization Effective Date: 1/10/2022  Authorization Expiration Date: 1/10/2023  Medication: Xtandi  Approved Dose/Quantity: #60/15 ds  Reference #: Key: [id number]   Insurance Company: [employer] ([facility]) - Phone [phone] Fax [phone]  Expected CoPay:       CoPay Card Available:      Foundation Assistance Needed:    Which Pharmacy is filling the prescription (Not needed for infusion/clinic administered): [facility] - [place], [place] - [address]  Pharmacy Notified: No  Patient Notified: No

## 2022-01-13 ENCOUNTER — LAB (OUTPATIENT)
Dept: LAB | Facility: CLINIC | Age: 62
End: 2022-01-13
Payer: COMMERCIAL

## 2022-01-13 DIAGNOSIS — C79.51 MALIGNANT NEOPLASM OF PROSTATE METASTATIC TO BONE (H): ICD-10-CM

## 2022-01-13 DIAGNOSIS — C61 MALIGNANT NEOPLASM OF PROSTATE METASTATIC TO BONE (H): ICD-10-CM

## 2022-01-13 LAB
ALBUMIN SERPL-MCNC: 3.5 G/DL (ref 3.4–5)
ALP SERPL-CCNC: 104 U/L (ref 40–150)
ALT SERPL W P-5'-P-CCNC: 22 U/L (ref 0–70)
ANION GAP SERPL CALCULATED.3IONS-SCNC: 3 MMOL/L (ref 3–14)
AST SERPL W P-5'-P-CCNC: 14 U/L (ref 0–45)
BASOPHILS # BLD AUTO: 0 10E3/UL (ref 0–0.2)
BASOPHILS NFR BLD AUTO: 0 %
BILIRUB SERPL-MCNC: 0.8 MG/DL (ref 0.2–1.3)
BUN SERPL-MCNC: 14 MG/DL (ref 7–30)
CALCIUM SERPL-MCNC: 9.1 MG/DL (ref 8.5–10.1)
CHLORIDE BLD-SCNC: 106 MMOL/L (ref 94–109)
CO2 SERPL-SCNC: 29 MMOL/L (ref 20–32)
CREAT SERPL-MCNC: 1.01 MG/DL (ref 0.66–1.25)
EOSINOPHIL # BLD AUTO: 0.1 10E3/UL (ref 0–0.7)
EOSINOPHIL NFR BLD AUTO: 1 %
ERYTHROCYTE [DISTWIDTH] IN BLOOD BY AUTOMATED COUNT: 14.4 % (ref 10–15)
GFR SERPL CREATININE-BSD FRML MDRD: 86 ML/MIN/1.73M2
GLUCOSE BLD-MCNC: 109 MG/DL (ref 70–99)
HCT VFR BLD AUTO: 40.6 % (ref 40–53)
HGB BLD-MCNC: 13.4 G/DL (ref 13.3–17.7)
LYMPHOCYTES # BLD AUTO: 1.9 10E3/UL (ref 0.8–5.3)
LYMPHOCYTES NFR BLD AUTO: 24 %
MCH RBC QN AUTO: 30 PG (ref 26.5–33)
MCHC RBC AUTO-ENTMCNC: 33 G/DL (ref 31.5–36.5)
MCV RBC AUTO: 91 FL (ref 78–100)
MONOCYTES # BLD AUTO: 0.8 10E3/UL (ref 0–1.3)
MONOCYTES NFR BLD AUTO: 9 %
NEUTROPHILS # BLD AUTO: 5.4 10E3/UL (ref 1.6–8.3)
NEUTROPHILS NFR BLD AUTO: 66 %
PLATELET # BLD AUTO: 207 10E3/UL (ref 150–450)
POTASSIUM BLD-SCNC: 4.1 MMOL/L (ref 3.4–5.3)
PROT SERPL-MCNC: 6.9 G/DL (ref 6.8–8.8)
RBC # BLD AUTO: 4.46 10E6/UL (ref 4.4–5.9)
SODIUM SERPL-SCNC: 138 MMOL/L (ref 133–144)
WBC # BLD AUTO: 8.1 10E3/UL (ref 4–11)

## 2022-01-13 PROCEDURE — 80053 COMPREHEN METABOLIC PANEL: CPT

## 2022-01-13 PROCEDURE — 85025 COMPLETE CBC W/AUTO DIFF WBC: CPT

## 2022-01-13 PROCEDURE — 36415 COLL VENOUS BLD VENIPUNCTURE: CPT

## 2022-01-14 ENCOUNTER — MYC MEDICAL ADVICE (OUTPATIENT)
Dept: ONCOLOGY | Facility: CLINIC | Age: 62
End: 2022-01-14
Payer: COMMERCIAL

## 2022-02-01 ENCOUNTER — HOSPITAL ENCOUNTER (OUTPATIENT)
Dept: NUCLEAR MEDICINE | Facility: CLINIC | Age: 62
Setting detail: NUCLEAR MEDICINE
End: 2022-02-01
Attending: INTERNAL MEDICINE
Payer: COMMERCIAL

## 2022-02-01 ENCOUNTER — HOSPITAL ENCOUNTER (OUTPATIENT)
Dept: CT IMAGING | Facility: CLINIC | Age: 62
End: 2022-02-01
Attending: INTERNAL MEDICINE
Payer: COMMERCIAL

## 2022-02-01 DIAGNOSIS — C61 PROSTATE CANCER (H): ICD-10-CM

## 2022-02-01 PROCEDURE — A9561 TC99M OXIDRONATE: HCPCS | Performed by: INTERNAL MEDICINE

## 2022-02-01 PROCEDURE — 250N000011 HC RX IP 250 OP 636: Performed by: RADIOLOGY

## 2022-02-01 PROCEDURE — 343N000001 HC RX 343: Performed by: INTERNAL MEDICINE

## 2022-02-01 PROCEDURE — 78306 BONE IMAGING WHOLE BODY: CPT

## 2022-02-01 PROCEDURE — 250N000009 HC RX 250: Performed by: RADIOLOGY

## 2022-02-01 PROCEDURE — 74177 CT ABD & PELVIS W/CONTRAST: CPT

## 2022-02-01 RX ORDER — IOPAMIDOL 755 MG/ML
500 INJECTION, SOLUTION INTRAVASCULAR ONCE
Status: COMPLETED | OUTPATIENT
Start: 2022-02-01 | End: 2022-02-01

## 2022-02-01 RX ADMIN — Medication 26 MILLICURIE: at 07:58

## 2022-02-01 RX ADMIN — SODIUM CHLORIDE 65 ML: 9 INJECTION, SOLUTION INTRAVENOUS at 08:16

## 2022-02-01 RX ADMIN — IOPAMIDOL 100 ML: 755 INJECTION, SOLUTION INTRAVENOUS at 08:16

## 2022-02-07 ENCOUNTER — MYC MEDICAL ADVICE (OUTPATIENT)
Dept: ONCOLOGY | Facility: CLINIC | Age: 62
End: 2022-02-07
Payer: COMMERCIAL

## 2022-02-07 NOTE — TELEPHONE ENCOUNTER
Situation: [facility] Telephone Triage Note  Walter (pt)reporting the following symptoms:  -cough this past weekend starting Saturday 2/5   -mild congestion/watery eyes  -mild diarrhea 2x on Saturday, imodium has helped    Background:   COVID vaccinated on 10/29/22 with [employer].   Currently has appts for Friday 2/11/22 10am labs, 10:30am Dr Winters, 1:30pm Infusion [facility]    Assessment:     COVID Test positive with home kit on Sunday 2/6/22.     Pt reports symptoms have improved, during call only thing feels is itchy eyes and slight hoarse voice.   Overall though feeling improving.     Denies fever,chills, headaches, sore throat, SOB, nausea/vomiting.     Pt was hoping to still make vacation to [place] on Friday 2/11/22 after appts and be in [place] for 4weeks.   Pt is aware of Qu[place] for 5 days after symptoms gone.     Recommendations:   Routed to Dr. Winters and care team for recommendations to plan.     Follow-Up Plan:

## 2022-02-09 DIAGNOSIS — C61 MALIGNANT NEOPLASM OF PROSTATE METASTATIC TO BONE (H): Primary | ICD-10-CM

## 2022-02-09 DIAGNOSIS — C79.51 MALIGNANT NEOPLASM OF PROSTATE METASTATIC TO BONE (H): Primary | ICD-10-CM

## 2022-02-11 ENCOUNTER — RESEARCH ENCOUNTER (OUTPATIENT)
Dept: ONCOLOGY | Facility: CLINIC | Age: 62
End: 2022-02-11

## 2022-02-11 ENCOUNTER — ONCOLOGY VISIT (OUTPATIENT)
Dept: ONCOLOGY | Facility: CLINIC | Age: 62
End: 2022-02-11
Attending: INTERNAL MEDICINE
Payer: COMMERCIAL

## 2022-02-11 VITALS
SYSTOLIC BLOOD PRESSURE: 135 MMHG | HEART RATE: 58 BPM | RESPIRATION RATE: 16 BRPM | TEMPERATURE: 98 F | DIASTOLIC BLOOD PRESSURE: 86 MMHG | OXYGEN SATURATION: 98 %

## 2022-02-11 VITALS
TEMPERATURE: 98 F | HEART RATE: 58 BPM | SYSTOLIC BLOOD PRESSURE: 135 MMHG | OXYGEN SATURATION: 98 % | DIASTOLIC BLOOD PRESSURE: 86 MMHG | RESPIRATION RATE: 16 BRPM

## 2022-02-11 DIAGNOSIS — R11.0 NAUSEA: ICD-10-CM

## 2022-02-11 DIAGNOSIS — C61 MALIGNANT NEOPLASM OF PROSTATE METASTATIC TO BONE (H): Primary | ICD-10-CM

## 2022-02-11 DIAGNOSIS — C79.51 MALIGNANT NEOPLASM OF PROSTATE METASTATIC TO BONE (H): Primary | ICD-10-CM

## 2022-02-11 PROCEDURE — 258N000003 HC RX IP 258 OP 636: Performed by: INTERNAL MEDICINE

## 2022-02-11 PROCEDURE — 96365 THER/PROPH/DIAG IV INF INIT: CPT

## 2022-02-11 PROCEDURE — 99214 OFFICE O/P EST MOD 30 MIN: CPT | Performed by: INTERNAL MEDICINE

## 2022-02-11 PROCEDURE — 96402 CHEMO HORMON ANTINEOPL SQ/IM: CPT

## 2022-02-11 PROCEDURE — 250N000011 HC RX IP 250 OP 636: Performed by: INTERNAL MEDICINE

## 2022-02-11 RX ORDER — HEPARIN SODIUM (PORCINE) LOCK FLUSH IV SOLN 100 UNIT/ML 100 UNIT/ML
5 SOLUTION INTRAVENOUS ONCE
Status: COMPLETED | OUTPATIENT
Start: 2022-02-11 | End: 2022-02-11

## 2022-02-11 RX ORDER — ZOLEDRONIC ACID 0.04 MG/ML
4 INJECTION, SOLUTION INTRAVENOUS ONCE
Status: CANCELLED | OUTPATIENT
Start: 2022-02-13 | End: 2022-02-13

## 2022-02-11 RX ORDER — ZOLEDRONIC ACID 0.04 MG/ML
4 INJECTION, SOLUTION INTRAVENOUS ONCE
Status: COMPLETED | OUTPATIENT
Start: 2022-02-11 | End: 2022-02-11

## 2022-02-11 RX ORDER — HEPARIN SODIUM (PORCINE) LOCK FLUSH IV SOLN 100 UNIT/ML 100 UNIT/ML
5 SOLUTION INTRAVENOUS
Status: CANCELLED | OUTPATIENT
Start: 2022-02-13

## 2022-02-11 RX ORDER — HEPARIN SODIUM,PORCINE 10 UNIT/ML
5 VIAL (ML) INTRAVENOUS
Status: CANCELLED | OUTPATIENT
Start: 2022-02-13

## 2022-02-11 RX ORDER — HEPARIN SODIUM (PORCINE) LOCK FLUSH IV SOLN 100 UNIT/ML 100 UNIT/ML
5 SOLUTION INTRAVENOUS
Status: DISCONTINUED | OUTPATIENT
Start: 2022-02-11 | End: 2022-02-11 | Stop reason: HOSPADM

## 2022-02-11 RX ORDER — PROCHLORPERAZINE MALEATE 10 MG
10 TABLET ORAL EVERY 6 HOURS PRN
Qty: 30 TABLET | Refills: 3 | Status: SHIPPED | OUTPATIENT
Start: 2022-02-11 | End: 2023-02-10

## 2022-02-11 RX ADMIN — SODIUM CHLORIDE 250 ML: 9 INJECTION, SOLUTION INTRAVENOUS at 11:31

## 2022-02-11 RX ADMIN — ZOLEDRONIC ACID 4 MG: 4 SOLUTION INTRAVENOUS at 11:31

## 2022-02-11 RX ADMIN — SODIUM CHLORIDE, PRESERVATIVE FREE 5 ML: 5 INJECTION INTRAVENOUS at 10:52

## 2022-02-11 RX ADMIN — Medication 5 ML: at 12:10

## 2022-02-11 RX ADMIN — LEUPROLIDE ACETATE 22.5 MG: KIT at 11:53

## 2022-02-11 ASSESSMENT — PAIN SCALES - GENERAL
PAINLEVEL: MILD PAIN (2)
PAINLEVEL: MILD PAIN (2)

## 2022-02-11 NOTE — LETTER
"  2/11/2022     RE: Walter Reyes  [id number] [facility] [address]    Dear Colleague,    Thank you for referring your patient, Walter Reyes, to the [facility]. Please see a copy of my visit note below.    MEDICAL ONCOLOGY FOLLOW-UP NOTE  Feb 11, 2022    REASON FOR VISIT: Follow-up for metastatic hormone-sensitive prostate cancer, currently on ADT alone.    HISTORY OF PRESENT ILLNESS: Mr. 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 His oncologic history is detailed below.         ONCOLOGIC HISTORY:  1. 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 Naylor at [facility]. Prior PSA were 1.4 on 8/16/17, 2.4 on 6/28/16, 2.9 on 6/28/16, and as low as 0.4 on 3/26/2003.   - 1/08/2019: Consultation with Sarah Wilson CNP in Urology clinic. Repeat PSA 9.6.  - 1/16/2019: MRI prostate with contrast - \"This examination is characterized as PIRADS 5- very high probability. Clinically significant cancer is highly likely to be present. There is a large, invasive mass arising from the right peripheral zone and extending into the neurovascular bundle, seminal vesicle, and along the anterolateral right mesorectal fascia. Metastatic right external iliac lymph node. Metastatic lesion in the posterior/superior right acetabulum.\"  - 1/25/2019: CT abdomen and pelvis with IV contrast - \"1. Heterogeneous enhancing mass posteriorly in the upper pole of the right kidney measures 4.5 x 5.8 x 5.7 cm (AP by transverse by craniocaudal). It has a small nodular component extending posterior medially which abuts the right psoas muscle. This nodular extension measures " "2.1 x 2.1 cm. Minimal stranding about the mass. No definite thrombus within the right renal vein. This renal mass is compatible with a renal cell carcinoma. A paraaortic lymph node situated immediately posterior to the left renal vein measures 1.1 cm in short axis, suspicious for metastasis. 2. A 2 cm right external iliac lymph node is also suspicious for metastases. 3. Multiple sclerotic osseous lesions suspicious for metastases. These include 0.8 and 0.6 cm sclerotic lesions laterally in the right iliac wing (images 53 and 62 respectively). Ill-defined groundglass density laterally in the right acetabulum measuring 1.7 x 1.2 cm corresponds with the lesion identified on MRI. A 0.4 cm groundglass density in the left acetabulum. Sclerotic lesion in the left femoral neck measures 0.9 x 1.6 cm (image 81). Sclerotic metastases would be more compatible with prostate metastases. 4. A 3 subcentimeter hepatic lesions are indeterminate. Metastases would be a consideration. These can be further characterized with liver MRI.\"  - 1/25/2019: NM bone scan - \"There is focal bony uptake in the left femoral neck, right acetabulum, right of midline at the S1 or L5 level of the spine, within multiple bilateral ribs, in the C7 or T1 level of the spine, and anteriorly within the skull. Findings are suspicious for metastases.\"  - 1/31/19: CT chest with contrast - \" No suspicious nodules in the chest.  Stable appearance of a 5.8 cm right renal mass concerning for renal carcinoma until proven otherwise.  Stable indeterminant subcentimeter hypodensities in the liver.  Multifocal osteoblastic metastasis including 2.5 cm lesion in the right fifth rib posteriorly and a 1.6 cm lesion in the right third rib posteriorly. No lytic lesions identified.\"  - 2/6/19: CT-guided right sclerotic fifth rib lesion biopsy - \"Metastatic carcinoma, consistent with prostate primary.  Immunohistochemical stains performed show the metastatic carcinoma stains " "positive for NKX3.1 (prostate marker), negative for STACIE 3 and PAX8, which supports the above diagnosis.\"  - 2/15/19: Started Casodex 50mg every day and consented for the biobanking protocol. 3/18/19 - stopped Casodex.  - 2/19/19: Case discussed in tumor board - recommendation for nephectomy for suspected malignant right renal mass.   - 2/26/19: Started Lupron 22.5mg every 3 months.   - 5/8/19: Started Docetaxel 75mg/m2 IV every 3 weeks. 06/18/19 - cycle 3, 7/10/19 - cycle 4, 07/31/19 - cycle 5, 08/21/19 - cycle 6.   - 10/2/19: Restaging CT CAP and NM Bone Scan showed improved osseous disease, no evidence of recurrent or new metastatic disease.  - 1/5/20: Restaging CT CAP and NM Bone Scan showed stable osseous disease, no evidence of recurrent or new metastatic disease.  - 4/6/20: NM bone scan with slightly improved uptake in known skeletal mets. CT C/A/P with contrast with stable osseous mets, no yusuf enlargement, no new visceral mets etc.  - 04/08/20: Zometa every 3 months.  - 10/5/20: CT C/A/P with contrast and NM bone scan - SD.   - 1/4/21: CT C/A/P with contrast and NM bone scan - SD but slight increase in right 5th rib tracer uptake and in posterior L5 sclerosis.   - 03/29/21: CT C/A/P with contrast - single new right sacral 8mm bone lesion (suspicious), other bone mets stable. No visceral/yusuf disease. Nephrectomy site without recurrence. NM bone scan - SD but \"increased uptake associated with the prior lesions of the lower  cervical spine, posterior right fourth rib and right L5 posterior arch elements. Otherwise unchanged uptake associated with the proximal left femur and left anterior fourth rib. Similar uptake of the left halux, possibly secondary to degenerative osteoarthritis or gout.\"    1/6/21  PSA = 0.29  3/31/21  PSA = 0.44  7/7/21  PSA = 0.47  11/2021  PSA = 1.05  -- Start XTANDI  12/16/21  PSA=0.22    Complaining of some cognitive 'cloudiness' and energy down a bit. But otherwise no " "complaints  Denies urinary complaints       2. Stage III ([id number]), grade 3 of 4, clear-cell RCC of right kidney:  - Incidentally diagnosed as above.   - Underwent curative-intent robotic right radical nephrectomy with Dr. Wesley Cleveland on 3/19/19. No tumor spillage per op report.   - Path showed: \"Histologic Type: Clear cell renal cell carcinoma; Sarcomatoid Features: Not identified; Histologic Grade: Nucleolar grade 3 (WHO/ISUP). Extent Tumor Size: 4.3 cm. Microscopic Tumor Extension: Tumor extension into renal sinus (in vascular structures). Margins: Negative. Tumor Necrosis: Present; focal. Lymph-Vascular Invasion: Not identified.  Pathologic Staging (pTNM) Primary Tumor (pT): pT3a: Tumor extends into renal vein branches/renal sinus. Regional Lymph Nodes (pN): pNX. Number of Lymph Nodes Examined: 0 Distant Metastasis (pM): pM N/A.\"  - Restaging scans as above.    PAST MEDICAL HISTORY:  Past Medical History:   Diagnosis Date     Cancer of kidney, right (H)      Complication of anesthesia     slow wakeup      Malignant neoplasm of prostate metastatic to bone (H) 2/14/2019     Thyroid disease      PAST SURGICAL HISTORY:   Past Surgical History:   Procedure Laterality Date     CYSTOSCOPY      about 10 years ago     INSERT PORT VASCULAR ACCESS Right 5/2/2019    Procedure: Chest Port Placement;  Surgeon: Tate Burciaga PA-C;  Location: [facility] OR     IR CHEST PORT PLACEMENT > 5 YRS OF AGE  5/2/2019     LAPAROSCOPIC NEPHRECTOMY Right 3/19/2019    Procedure: Right laparoscopic radical nephrectomy;  Surgeon: Wesley Cleveland MD;  Location:  OR      SOCIAL HISTORY:   Social History     Tobacco Use     Smoking status: Never Smoker     Smokeless tobacco: Never Used   Substance Use Topics     Alcohol use: Not on file     Comment: wine - very rare     Drug use: No     FAMILY HISTORY:   Family History   Problem Relation Age of Onset     Diabetes Father      ALLERGIES:   Allergies   Allergen Reactions     " Doxycycline GI Disturbance     CURRENT MEDICATIONS:   Current Outpatient Medications:      amLODIPine (NORVASC) 10 MG tablet, Take 1 tablet (10 mg) by mouth daily, Disp: 30 tablet, Rfl: 0     atorvastatin (LIPITOR) 20 MG tablet, Take 60 mg by mouth daily , Disp: , Rfl:      calcium citrate-vitamin D (CITRACAL) 315-250 MG-UNIT TABS per tablet, Take 650 mg by mouth 2 times daily , Disp: , Rfl:      cetirizine (ZYRTEC) 10 MG tablet, Take 10 mg by mouth as needed for allergies , Disp: , Rfl:      cycloSPORINE (RESTASIS) 0.05 % ophthalmic emulsion, Place 1 drop into both eyes 2 times daily , Disp: , Rfl:      doxycycline hyclate (VIBRA-TABS) 100 MG tablet, Take 100 mg by mouth daily Daily every other month, Disp: , Rfl:      enzalutamide (XTANDI) 40 MG capsule, Take 4 capsules (160 mg) by mouth daily, Disp: 120 capsule, Rfl: 0     fluticasone (FLONASE) 50 MCG/ACT nasal spray, Spray 2 sprays in nostril daily as needed , Disp: , Rfl:      Glucosamine-Chondroit-Vit C-Mn (GLUCOSAMINE 1500 COMPLEX) CAPS, Take 1 capsule by mouth daily, Disp: , Rfl:      levothyroxine (SYNTHROID/LEVOTHROID) 100 MCG tablet, Take 100 mcg by mouth daily, Disp: , Rfl:      loratadine (CLARITIN) 10 MG tablet, , Disp: , Rfl:      MAGNESIUM PO, Take 250 mg by mouth 2 times daily , Disp: , Rfl:      MEBENDAZOLE PO, Take 225 mg by mouth daily , Disp: , Rfl:      metFORMIN (GLUCOPHAGE) 500 MG tablet, Take 1,500 mg by mouth 2 times daily (with meals) 500 mg 1000 at PM, Disp: , Rfl:      Multiple Vitamins-Minerals (MULTIVITAMIN ADULT EXTRA C PO), Take 1 tablet by mouth every 24 hours, Disp: , Rfl:      Nutritional Supplements (SALMON OIL) CAPS, Take 2 capsules by mouth daily , Disp: , Rfl:      omeprazole (PRILOSEC) 20 MG DR capsule, Take 20 mg by mouth daily, Disp: , Rfl:      tamsulosin (FLOMAX) 0.4 MG capsule, Take 1 capsule (0.4 mg) by mouth daily, Disp: 30 capsule, Rfl: 3  No current facility-administered medications for this visit.    PHYSICAL  "EXAMINATION:  VITALS: /86   Pulse 58   Temp 98  F (36.7  C)   Resp 16   SpO2 98%      ECOG PS 1.    General: Patient appears well in no acute distress.   Skin: No visualized rash or lesions on visualized skin  Eyes: EOMI, no erythema, sclera icterus or discharge noted  Resp: Appears to be breathing comfortably without accessory muscle usage, speaking in full sentences, no cough  MSK: Appears to have normal range of motion based on visualized movements  Neurologic: No apparent tremors, facial movements symmetric  Psych: affect normal, alert and oriented    LABORATORY DATA/IMAGIN/1/22  CT  IMPRESSION:  1.  Stable distribution of bony metastatic disease showing increased  sclerosis involving some lesions. Correlate with bone scanning for  further assessment.  2.  No new disease otherwise seen.  3.  Stable tiny pulmonary nodule in the right.  4.  Stable hiatal hernia.    BS  IMPRESSION:    1. New or more conspicuous focus of radiotracer activity in the  anterior left first rib, consistent with disease progression.  2. Remainder of the metastatic skeletal foci are similar to prior.    I personally reviewed labs and imaging today     ASSESSMENT & PLAN: Mr. Reyes is a delightful 61 year old gentleman with recently diagnosed metastatic castration sensitive prostate adenocarcinoma as well as an incidentally diagnosed stage III clear-cell renal cell carcinoma of the right kidney (s/p radical R nephrectomy 3/19/19), who is here for a follow-up visit after completion of docetaxel for prostate cancer.     1. Metastatic prostate cancer, with involvement of the bones and RPLN:   - Patient met the criteria for ARTUROED \"high-volume\" metastatic hormone-sensitive prostate cancer. He opted for docetaxel in combination with ADT (per JOSEFA, [id number], STAMPEDE). Tolerated 6 cycles of docetaxel fairly well (19 through 19), with the exception of mild fatigue and mild neuropathy. Now on ADT and " tolerating fairly well.  - Continue leuprolide 22.5mg every 3 months,  -continue XTANDI  -due to cognitive issues could consider dose reduction or switch to abiraterone  -patient informed he could drop dose to 1/2 ( two pills) for a few days to see if the thinking improves. If it does, could reduce to that dose and monitor PSA    2. Bone metastases:   - Noted to have osseous metastatic disease at the time of diagnosis. Worsening mets seen on imaging today   - Encouraged to continue calcium and vitamin D supplementation.  - Will continue Zometa 4mg IV every 3 months, will get dose today.Reassess for continued therapy in April 2022.  - I think we are seeing bone scan flare in the left rib     3. Stage 3, UISS-high risk ccRCC:  - pproximately 40-50% risk of recurrence after definitive surgery.   - no clear evidence of residual disease at this time; the retroperitoneal lymphadenopathy is more consistent with metastatic prostate cancer   - Continue active surveillance with self-reporting for signs/symptoms of recurrence (this was previously explained in detail) and periodic H&P and scans.     4. COVID - recovered but still needs to be vaccinated! Encouraged.     Jj Winters MD  [facility]  [address], [place] [id number] [phone]

## 2022-02-11 NOTE — PATIENT INSTRUCTIONS
[facility] Triage and after hours / weekends / holidays:  [phone]    Please call the triage or after hours line if you experience a temperature greater than or equal to 100.5, shaking chills, have uncontrolled nausea, vomiting and/or diarrhea, dizziness, shortness of breath, chest pain, bleeding, unexplained bruising, or if you have any other new/concerning symptoms, questions or concerns.      If you are having any concerning symptoms or wish to speak to a provider before your next infusion visit, please call your care coordinator or triage to notify them so we can adequately serve you.     If you need a refill on a narcotic prescription or other medication, please call before your infusion appointment.

## 2022-02-11 NOTE — PROGRESS NOTES
Infusion Nursing Note:  Walter DELORIS Reyes presents today for zometa, lupron.    Patient seen by provider today: Yes: Dr. Winters   present during visit today: Not Applicable.    Note:   Patient has no questions or concerns after his appointment with Dr. Winters.    Intravenous Access:  Implanted Port.    Treatment Conditions:  Lab Results   Component Value Date     02/11/2022    POTASSIUM 4.0 02/11/2022    CR 0.87 02/11/2022    BETHANY 9.2 02/11/2022    BILITOTAL 0.4 02/11/2022    ALBUMIN 3.7 02/11/2022    ALT 22 02/11/2022    AST 17 02/11/2022     Results reviewed, labs MET treatment parameters, ok to proceed with treatment.      Post Infusion Assessment:  Patient tolerated infusion without incident.  Patient tolerated lupron injection into right ventrogluteal without incident.  Blood return noted pre and post infusion.  Site patent and intact, free from redness, edema or discomfort.  No evidence of extravasations.  Access discontinued per protocol.       Discharge Plan:   Patient declined prescription refills.  Discharge instructions reviewed with: Patient.  Patient and/or family verbalized understanding of discharge instructions and all questions answered.  AVS to patient via [employer].  Patient will return 5/6 for next infusion appointment.   Patient discharged in stable condition accompanied by: self.  Departure Mode: Ambulatory.      Concetta Rushing RN

## 2022-02-11 NOTE — NURSING NOTE
Chief Complaint   Patient presents with     Blood Draw     Labs obtained via noncoring port, 23 gauge 3/4 inch needle, VS taken, checked into next appt     Oncology Clinic Visit     Hx malignant neoplasm of prostate metastatic to bone here for provider visit

## 2022-02-11 NOTE — PROGRESS NOTES
"MEDICAL ONCOLOGY FOLLOW-UP NOTE  Feb 11, 2022    REASON FOR VISIT: Follow-up for metastatic hormone-sensitive prostate cancer, currently on ADT alone.    HISTORY OF PRESENT ILLNESS: Mr. 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 His oncologic history is detailed below.         ONCOLOGIC HISTORY:  1. 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 Naylor at [facility]. Prior PSA were 1.4 on 8/16/17, 2.4 on 6/28/16, 2.9 on 6/28/16, and as low as 0.4 on 3/26/2003.   - 1/08/2019: Consultation with Sarah Wilson CNP in Urology clinic. Repeat PSA 9.6.  - 1/16/2019: MRI prostate with contrast - \"This examination is characterized as PIRADS 5- very high probability. Clinically significant cancer is highly likely to be present. There is a large, invasive mass arising from the right peripheral zone and extending into the neurovascular bundle, seminal vesicle, and along the anterolateral right mesorectal fascia. Metastatic right external iliac lymph node. Metastatic lesion in the posterior/superior right acetabulum.\"  - 1/25/2019: CT abdomen and pelvis with IV contrast - \"1. Heterogeneous enhancing mass posteriorly in the upper pole of the right kidney measures 4.5 x 5.8 x 5.7 cm (AP by transverse by craniocaudal). It has a small nodular component extending posterior medially which abuts the right psoas muscle. This nodular extension measures 2.1 x 2.1 cm. Minimal stranding about the mass. No definite thrombus within the right renal vein. This renal mass is compatible with a renal cell carcinoma. A paraaortic lymph node situated immediately posterior to the left renal vein measures 1.1 cm in short axis, " "suspicious for metastasis. 2. A 2 cm right external iliac lymph node is also suspicious for metastases. 3. Multiple sclerotic osseous lesions suspicious for metastases. These include 0.8 and 0.6 cm sclerotic lesions laterally in the right iliac wing (images 53 and 62 respectively). Ill-defined groundglass density laterally in the right acetabulum measuring 1.7 x 1.2 cm corresponds with the lesion identified on MRI. A 0.4 cm groundglass density in the left acetabulum. Sclerotic lesion in the left femoral neck measures 0.9 x 1.6 cm (image 81). Sclerotic metastases would be more compatible with prostate metastases. 4. A 3 subcentimeter hepatic lesions are indeterminate. Metastases would be a consideration. These can be further characterized with liver MRI.\"  - 1/25/2019: NM bone scan - \"There is focal bony uptake in the left femoral neck, right acetabulum, right of midline at the S1 or L5 level of the spine, within multiple bilateral ribs, in the C7 or T1 level of the spine, and anteriorly within the skull. Findings are suspicious for metastases.\"  - 1/31/19: CT chest with contrast - \" No suspicious nodules in the chest.  Stable appearance of a 5.8 cm right renal mass concerning for renal carcinoma until proven otherwise.  Stable indeterminant subcentimeter hypodensities in the liver.  Multifocal osteoblastic metastasis including 2.5 cm lesion in the right fifth rib posteriorly and a 1.6 cm lesion in the right third rib posteriorly. No lytic lesions identified.\"  - 2/6/19: CT-guided right sclerotic fifth rib lesion biopsy - \"Metastatic carcinoma, consistent with prostate primary.  Immunohistochemical stains performed show the metastatic carcinoma stains positive for NKX3.1 (prostate marker), negative for STACIE 3 and PAX8, which supports the above diagnosis.\"  - 2/15/19: Started Casodex 50mg every day and consented for the biobanking protocol. 3/18/19 - stopped Casodex.  - 2/19/19: Case discussed in tumor board - " "recommendation for nephectomy for suspected malignant right renal mass.   - 2/26/19: Started Lupron 22.5mg every 3 months.   - 5/8/19: Started Docetaxel 75mg/m2 IV every 3 weeks. 06/18/19 - cycle 3, 7/10/19 - cycle 4, 07/31/19 - cycle 5, 08/21/19 - cycle 6.   - 10/2/19: Restaging CT CAP and NM Bone Scan showed improved osseous disease, no evidence of recurrent or new metastatic disease.  - 1/5/20: Restaging CT CAP and NM Bone Scan showed stable osseous disease, no evidence of recurrent or new metastatic disease.  - 4/6/20: NM bone scan with slightly improved uptake in known skeletal mets. CT C/A/P with contrast with stable osseous mets, no yusuf enlargement, no new visceral mets etc.  - 04/08/20: Zometa every 3 months.  - 10/5/20: CT C/A/P with contrast and NM bone scan - SD.   - 1/4/21: CT C/A/P with contrast and NM bone scan - SD but slight increase in right 5th rib tracer uptake and in posterior L5 sclerosis.   - 03/29/21: CT C/A/P with contrast - single new right sacral 8mm bone lesion (suspicious), other bone mets stable. No visceral/yusuf disease. Nephrectomy site without recurrence. NM bone scan - SD but \"increased uptake associated with the prior lesions of the lower  cervical spine, posterior right fourth rib and right L5 posterior arch elements. Otherwise unchanged uptake associated with the proximal left femur and left anterior fourth rib. Similar uptake of the left halux, possibly secondary to degenerative osteoarthritis or gout.\"    1/6/21  PSA = 0.29  3/31/21  PSA = 0.44  7/7/21  PSA = 0.47  11/2021  PSA = 1.05  -- Start XTANDI  12/16/21  PSA=0.22    Complaining of some cognitive 'cloudiness' and energy down a bit. But otherwise no complaints  Denies urinary complaints       2. Stage III ([id number]), grade 3 of 4, clear-cell RCC of right kidney:  - Incidentally diagnosed as above.   - Underwent curative-intent robotic right radical nephrectomy with Dr. Wesley Cleveland on 3/19/19. No tumor spillage per op " "report.   - Path showed: \"Histologic Type: Clear cell renal cell carcinoma; Sarcomatoid Features: Not identified; Histologic Grade: Nucleolar grade 3 (WHO/ISUP). Extent Tumor Size: 4.3 cm. Microscopic Tumor Extension: Tumor extension into renal sinus (in vascular structures). Margins: Negative. Tumor Necrosis: Present; focal. Lymph-Vascular Invasion: Not identified.  Pathologic Staging (pTNM) Primary Tumor (pT): pT3a: Tumor extends into renal vein branches/renal sinus. Regional Lymph Nodes (pN): pNX. Number of Lymph Nodes Examined: 0 Distant Metastasis (pM): pM N/A.\"  - Restaging scans as above.    PAST MEDICAL HISTORY:  Past Medical History:   Diagnosis Date     Cancer of kidney, right (H)      Complication of anesthesia     slow wakeup      Malignant neoplasm of prostate metastatic to bone (H) 2/14/2019     Thyroid disease      PAST SURGICAL HISTORY:   Past Surgical History:   Procedure Laterality Date     CYSTOSCOPY      about 10 years ago     INSERT PORT VASCULAR ACCESS Right 5/2/2019    Procedure: Chest Port Placement;  Surgeon: Tate Burciaga PA-C;  Location: [facility] OR     IR CHEST PORT PLACEMENT > 5 YRS OF AGE  5/2/2019     LAPAROSCOPIC NEPHRECTOMY Right 3/19/2019    Procedure: Right laparoscopic radical nephrectomy;  Surgeon: Wesley Cleveland MD;  Location: [facility] OR      SOCIAL HISTORY:   Social History     Tobacco Use     Smoking status: Never Smoker     Smokeless tobacco: Never Used   Substance Use Topics     Alcohol use: Not on file     Comment: wine - very rare     Drug use: No     FAMILY HISTORY:   Family History   Problem Relation Age of Onset     Diabetes Father      ALLERGIES:   Allergies   Allergen Reactions     Doxycycline GI Disturbance     CURRENT MEDICATIONS:   Current Outpatient Medications:      amLODIPine (NORVASC) 10 MG tablet, Take 1 tablet (10 mg) by mouth daily, Disp: 30 tablet, Rfl: 0     atorvastatin (LIPITOR) 20 MG tablet, Take 60 mg by mouth daily , Disp: , Rfl:      " calcium citrate-vitamin D (CITRACAL) 315-250 MG-UNIT TABS per tablet, Take 650 mg by mouth 2 times daily , Disp: , Rfl:      cetirizine (ZYRTEC) 10 MG tablet, Take 10 mg by mouth as needed for allergies , Disp: , Rfl:      cycloSPORINE (RESTASIS) 0.05 % ophthalmic emulsion, Place 1 drop into both eyes 2 times daily , Disp: , Rfl:      doxycycline hyclate (VIBRA-TABS) 100 MG tablet, Take 100 mg by mouth daily Daily every other month, Disp: , Rfl:      enzalutamide (XTANDI) 40 MG capsule, Take 4 capsules (160 mg) by mouth daily, Disp: 120 capsule, Rfl: 0     fluticasone (FLONASE) 50 MCG/ACT nasal spray, Spray 2 sprays in nostril daily as needed , Disp: , Rfl:      Glucosamine-Chondroit-Vit C-Mn (GLUCOSAMINE 1500 COMPLEX) CAPS, Take 1 capsule by mouth daily, Disp: , Rfl:      levothyroxine (SYNTHROID/LEVOTHROID) 100 MCG tablet, Take 100 mcg by mouth daily, Disp: , Rfl:      loratadine (CLARITIN) 10 MG tablet, , Disp: , Rfl:      MAGNESIUM PO, Take 250 mg by mouth 2 times daily , Disp: , Rfl:      MEBENDAZOLE PO, Take 225 mg by mouth daily , Disp: , Rfl:      metFORMIN (GLUCOPHAGE) 500 MG tablet, Take 1,500 mg by mouth 2 times daily (with meals) 500 mg 1000 at PM, Disp: , Rfl:      Multiple Vitamins-Minerals (MULTIVITAMIN ADULT EXTRA C PO), Take 1 tablet by mouth every 24 hours, Disp: , Rfl:      Nutritional Supplements (SALMON OIL) CAPS, Take 2 capsules by mouth daily , Disp: , Rfl:      omeprazole (PRILOSEC) 20 MG DR capsule, Take 20 mg by mouth daily, Disp: , Rfl:      tamsulosin (FLOMAX) 0.4 MG capsule, Take 1 capsule (0.4 mg) by mouth daily, Disp: 30 capsule, Rfl: 3  No current facility-administered medications for this visit.    PHYSICAL EXAMINATION:  VITALS: /86   Pulse 58   Temp 98  F (36.7  C)   Resp 16   SpO2 98%      ECOG PS 1.    General: Patient appears well in no acute distress.   Skin: No visualized rash or lesions on visualized skin  Eyes: EOMI, no erythema, sclera icterus or discharge  "noted  Resp: Appears to be breathing comfortably without accessory muscle usage, speaking in full sentences, no cough  MSK: Appears to have normal range of motion based on visualized movements  Neurologic: No apparent tremors, facial movements symmetric  Psych: affect normal, alert and oriented    LABORATORY DATA/IMAGIN/1/22  CT  IMPRESSION:  1.  Stable distribution of bony metastatic disease showing increased  sclerosis involving some lesions. Correlate with bone scanning for  further assessment.  2.  No new disease otherwise seen.  3.  Stable tiny pulmonary nodule in the right.  4.  Stable hiatal hernia.      BS  IMPRESSION:    1. New or more conspicuous focus of radiotracer activity in the  anterior left first rib, consistent with disease progression.  2. Remainder of the metastatic skeletal foci are similar to prior.      I personally reviewed labs and imaging today     ASSESSMENT & PLAN: Mr. Reyes is a delightful 61 year old gentleman with recently diagnosed metastatic castration sensitive prostate adenocarcinoma as well as an incidentally diagnosed stage III clear-cell renal cell carcinoma of the right kidney (s/p radical R nephrectomy 3/19/19), who is here for a follow-up visit after completion of docetaxel for prostate cancer.     1. Metastatic prostate cancer, with involvement of the bones and RPLN:   - Patient met the criteria for CHAARTED \"high-volume\" metastatic hormone-sensitive prostate cancer. He opted for docetaxel in combination with ADT (per JOSEFA, SHAVONNE-[id number], KARLA). Tolerated 6 cycles of docetaxel fairly well (19 through 19), with the exception of mild fatigue and mild neuropathy. Now on ADT and tolerating fairly well.  - Continue leuprolide 22.5mg every 3 months,  -continue XTANDI  -due to cognitive issues could consider dose reduction or switch to abiraterone  -patient informed he could drop dose to 1/2 ( two pills) for a few days to see if the thinking improves. " If it does, could reduce to that dose and monitor PSA    2. Bone metastases:   - Noted to have osseous metastatic disease at the time of diagnosis. Worsening mets seen on imaging today   - Encouraged to continue calcium and vitamin D supplementation.  - Will continue Zometa 4mg IV every 3 months, will get dose today.Reassess for continued therapy in April 2022.  - I think we are seeing bone scan flare in the left rib     3. Stage 3, UISS-high risk ccRCC:  - pproximately 40-50% risk of recurrence after definitive surgery.   - no clear evidence of residual disease at this time; the retroperitoneal lymphadenopathy is more consistent with metastatic prostate cancer   - Continue active surveillance with self-reporting for signs/symptoms of recurrence (this was previously explained in detail) and periodic H&P and scans.     4. COVID - recovered but still needs to be vaccinated! Encouraged.     Jj Winters MD  [facility]  [address], [place] [id number] [phone]

## 2022-02-11 NOTE — PROGRESS NOTES
[id number]: Study Visit Note   Subject name: Walter Reyes   Subject ID# [id number]    Visit: Month 3    Subject met with coordinator on 2/11/2022 for month 3 visit and completed all cognitive testing and questionnaires.     Did the study visit occur within the appropriate window allowed by the protocol? Yes    Called Micheline, Walter's wife, to go through the caregiver questionnaire on 2/16/2022 . Caregiver questionnaire was completed.        Gricel Fleming  Clinical Research Coordinator   [phone]

## 2022-02-19 ENCOUNTER — HEALTH MAINTENANCE LETTER (OUTPATIENT)
Age: 62
End: 2022-02-19

## 2022-02-22 ENCOUNTER — TELEPHONE (OUTPATIENT)
Dept: ONCOLOGY | Facility: CLINIC | Age: 62
End: 2022-02-22
Payer: COMMERCIAL

## 2022-02-22 NOTE — ORAL ONC MGMT
Oral Chemotherapy Monitoring Program     Placed call to patient in follow up of oral chemotherapy. Left message requesting call back. No drug names were mentioned. Will update when response received.     Brisa Goodwin, PharmD, [facility]  Oral Chemotherapy Monitoring Program  [facility]  [phone]  February 22, 2022

## 2022-02-24 ENCOUNTER — TELEPHONE (OUTPATIENT)
Dept: ONCOLOGY | Facility: CLINIC | Age: 62
End: 2022-02-24
Payer: COMMERCIAL

## 2022-02-24 NOTE — ORAL ONC MGMT
Oral Chemotherapy Monitoring Program    Subjective/Objective:  Walter Reyes is a 61 year old male contacted by phone for a follow-up visit for oral chemotherapy.  Walter returned our call. He reported some muscle pain at the base of his spine and buttocks and he is seeing a chiropractor for this. He feels this is tolerable, though he noted it does limit his ability to go on walks/exercise somewhat. He will also use acetaminophen as needed for any aches or pains. He said he does not always get the recommended 64 ounces of water per day. He said he did not reduce his dose of Xtandi, rather he has remained on 160 mg daily since he started. He said he has some trouble keeping his focus while concentrating at work but he feels he is able to power through it. He also reported some increased neuropathy in his toes, a return of some symptoms that predate Xtandi treatment. He currently spending a month in [place]. He will continue to monitor these and report if the situation changes or any adverse effects become unmanageable or intolerable.    ORAL CHEMOTHERAPY 12/17/2021 12/28/2021 1/10/2022 1/14/2022 2/9/2022 2/22/2022 2/24/2022   Assessment Type Lab Monitoring;Monthly Follow up Initial Follow up Refill Lab Monitoring Refill Left Voicemail Incoming phone call;Initial Follow up;Other   Diagnosis Code Prostate Cancer Prostate Cancer Prostate Cancer Prostate Cancer Prostate Cancer Prostate Cancer Prostate Cancer   Providers Dr. Singh Winters   [facility]   Drug Name Xtandi (enzalutamide) Xtandi (enzalutamide) Xtandi (enzalutamide) Xtandi (enzalutamide) Xtandi (enzalutamide) Xtandi (enzalutamide) Xtandi (enzalutamide)   Dose 160 mg 160 mg 160 mg 160 mg 160 mg 160 mg 160 mg   Current Schedule Daily Daily Daily Daily Daily Daily Daily   Cycle Details Continuous Continuous Continuous Continuous Continuous  Continuous Continuous   Start Date of Last Cycle 12/17/2021 - - - - - -   Planned next cycle start date 1/16/2022 - 1/16/2022 - - - -   Doses missed in last 2 weeks 0 - - - - - 0   Adherence Assessment Adherent - - - - - Adherent   Adverse Effects No AE identified during assessment Other (See Note for Details) - - - - Other (See Note for Details)   Fatigue - - - - - - -   Pharmacist Intervention(fatigue) - - - - - - -   Pharmacist intervention(other) - Yes - - - - Yes   Intervention(s) - Patient education - - - - Patient education   Home BPs all BPs<140/90 - - - - - -   Any new drug interactions? No - - - - - No   Pharmacist Intervention? - - - - - - -   Intervention(s) - - - - - - -   Is the dose as ordered appropriate for the patient? Yes - - - - - -   Has the patient missed any days of school, work, or other routine activity? No - - - - - -   Since the last time we talked, have you been hospitalized or used the emergency room? No - - - - - -       Last PHQ-2 Score on record:   PHQ-2 ( 1999 Pf[employer]) 2/3/2020 7/11/2019   Q1: Little interest or pleasure in doing things 0 0   Q2: Feeling down, depressed or hopeless 0 0   PHQ-2 Score 0 0   PHQ-2 Total Score (12-17 Years)- Positive if 3 or more points; Administer PHQ-A if positive 0 0   Q1: Little interest or pleasure in doing things Not at all -   Q2: Feeling down, depressed or hopeless Not at all -   PHQ-2 Score 0 -       Vitals:  BP:   BP Readings from Last 1 Encounters:   02/11/22 135/86     Wt Readings from Last 1 Encounters:   11/15/21 111.9 kg (246 lb 12.8 oz)     Estimated body surface area is 2.38 meters squared as calculated from the following:    Height as of 8/4/20: 1.829 m (6').    Weight as of 11/15/21: 111.9 kg (246 lb 12.8 oz).    Labs:  _  Result Component Current Result Ref Range   Sodium 142 (2/11/2022) 133 - 144 mmol/L     _  Result Component Current Result Ref Range   Potassium 4.0 (2/11/2022) 3.4 - 5.3 mmol/L     _  Result Component Current Result  Ref Range   Calcium 9.2 (2/11/2022) 8.5 - 10.1 mg/dL     No results found for Mag within last 30 days.     No results found for Phos within last 30 days.     _  Result Component Current Result Ref Range   Albumin 3.7 (2/11/2022) 3.4 - 5.0 g/dL     _  Result Component Current Result Ref Range   Urea Nitrogen 14 (2/11/2022) 7 - 30 mg/dL     _  Result Component Current Result Ref Range   Creatinine 0.87 (2/11/2022) 0.66 - 1.25 mg/dL     _  Result Component Current Result Ref Range   AST 17 (2/11/2022) 0 - 45 U/L     _  Result Component Current Result Ref Range   ALT 22 (2/11/2022) 0 - 70 U/L     _  Result Component Current Result Ref Range   Bilirubin Total 0.4 (2/11/2022) 0.2 - 1.3 mg/dL     _  Result Component Current Result Ref Range   WBC Count 5.8 (2/11/2022) 4.0 - 11.0 10e3/uL     _  Result Component Current Result Ref Range   Hemoglobin 13.4 (2/11/2022) 13.3 - 17.7 g/dL     _  Result Component Current Result Ref Range   Platelet Count 225 (2/11/2022) 150 - 450 10e3/uL     No results found for ANC within last 30 days.     _  Result Component Current Result Ref Range   Absolute Neutrophils 3.2 (2/11/2022) 1.6 - 8.3 10e3/uL          Assessment/Plan:  Walter is continuing on Xtandi at full dose and reiterated his willingness and preference to do so. He will monitor his symptoms and report any concerns. He plans to have labs near the end of March. Recommended exercise as much as possible and increase water intake to 64 ounces daily. He expressed understanding and agreement with this plan. He thanked me for the call and care.    Follow-Up:  Pending labs in late March.    Refill Due:  By 3/16/2022    Lopez PazD  [facility]  [phone]  February 24, 2022

## 2022-02-25 DIAGNOSIS — C61 MALIGNANT NEOPLASM OF PROSTATE METASTATIC TO BONE (H): Primary | ICD-10-CM

## 2022-02-25 DIAGNOSIS — C79.51 MALIGNANT NEOPLASM OF PROSTATE METASTATIC TO BONE (H): Primary | ICD-10-CM

## 2022-03-02 DIAGNOSIS — C79.51 MALIGNANT NEOPLASM OF PROSTATE METASTATIC TO BONE (H): Primary | ICD-10-CM

## 2022-03-02 DIAGNOSIS — C61 MALIGNANT NEOPLASM OF PROSTATE METASTATIC TO BONE (H): Primary | ICD-10-CM

## 2022-03-22 ENCOUNTER — LAB (OUTPATIENT)
Dept: LAB | Facility: CLINIC | Age: 62
End: 2022-03-22
Payer: COMMERCIAL

## 2022-03-22 DIAGNOSIS — C79.51 MALIGNANT NEOPLASM OF PROSTATE METASTATIC TO BONE (H): ICD-10-CM

## 2022-03-22 DIAGNOSIS — C61 MALIGNANT NEOPLASM OF PROSTATE METASTATIC TO BONE (H): ICD-10-CM

## 2022-03-22 LAB
ALBUMIN SERPL-MCNC: 3.7 G/DL (ref 3.4–5)
ALP SERPL-CCNC: 82 U/L (ref 40–150)
ALT SERPL W P-5'-P-CCNC: 21 U/L (ref 0–70)
ANION GAP SERPL CALCULATED.3IONS-SCNC: 10 MMOL/L (ref 3–14)
AST SERPL W P-5'-P-CCNC: 18 U/L (ref 0–45)
BASOPHILS # BLD AUTO: 0 10E3/UL (ref 0–0.2)
BASOPHILS NFR BLD AUTO: 0 %
BILIRUB SERPL-MCNC: 0.4 MG/DL (ref 0.2–1.3)
BUN SERPL-MCNC: 14 MG/DL (ref 7–30)
CALCIUM SERPL-MCNC: 9.4 MG/DL (ref 8.5–10.1)
CHLORIDE BLD-SCNC: 109 MMOL/L (ref 94–109)
CO2 SERPL-SCNC: 21 MMOL/L (ref 20–32)
CREAT SERPL-MCNC: 1 MG/DL (ref 0.66–1.25)
EOSINOPHIL # BLD AUTO: 0.2 10E3/UL (ref 0–0.7)
EOSINOPHIL NFR BLD AUTO: 3 %
ERYTHROCYTE [DISTWIDTH] IN BLOOD BY AUTOMATED COUNT: 14.2 % (ref 10–15)
GFR SERPL CREATININE-BSD FRML MDRD: 86 ML/MIN/1.73M2
GLUCOSE BLD-MCNC: 110 MG/DL (ref 70–99)
HCT VFR BLD AUTO: 40.7 % (ref 40–53)
HGB BLD-MCNC: 13 G/DL (ref 13.3–17.7)
LYMPHOCYTES # BLD AUTO: 2.1 10E3/UL (ref 0.8–5.3)
LYMPHOCYTES NFR BLD AUTO: 32 %
MCH RBC QN AUTO: 29.5 PG (ref 26.5–33)
MCHC RBC AUTO-ENTMCNC: 31.9 G/DL (ref 31.5–36.5)
MCV RBC AUTO: 92 FL (ref 78–100)
MONOCYTES # BLD AUTO: 0.5 10E3/UL (ref 0–1.3)
MONOCYTES NFR BLD AUTO: 8 %
NEUTROPHILS # BLD AUTO: 3.7 10E3/UL (ref 1.6–8.3)
NEUTROPHILS NFR BLD AUTO: 57 %
PLATELET # BLD AUTO: 239 10E3/UL (ref 150–450)
POTASSIUM BLD-SCNC: 4.7 MMOL/L (ref 3.4–5.3)
PROT SERPL-MCNC: 7 G/DL (ref 6.8–8.8)
PSA SERPL-MCNC: 0.72 UG/L (ref 0–4)
RBC # BLD AUTO: 4.41 10E6/UL (ref 4.4–5.9)
SODIUM SERPL-SCNC: 140 MMOL/L (ref 133–144)
WBC # BLD AUTO: 6.6 10E3/UL (ref 4–11)

## 2022-03-22 PROCEDURE — 84153 ASSAY OF PSA TOTAL: CPT

## 2022-03-22 PROCEDURE — 80053 COMPREHEN METABOLIC PANEL: CPT

## 2022-03-22 PROCEDURE — 36415 COLL VENOUS BLD VENIPUNCTURE: CPT

## 2022-03-22 PROCEDURE — 85025 COMPLETE CBC W/AUTO DIFF WBC: CPT

## 2022-03-23 ENCOUNTER — NURSE TRIAGE (OUTPATIENT)
Dept: ONCOLOGY | Facility: CLINIC | Age: 62
End: 2022-03-23
Payer: COMMERCIAL

## 2022-03-23 ENCOUNTER — TELEPHONE (OUTPATIENT)
Dept: ONCOLOGY | Facility: CLINIC | Age: 62
End: 2022-03-23
Payer: COMMERCIAL

## 2022-03-23 NOTE — TELEPHONE ENCOUNTER
[facility] Triage    Incoming call: Lower back pain and PSA levels rising - 2nd month in a row    2/11/22 Saw Dr. Winters    5/6/22 Next for labs and infusion    Advised per Dr. Winters's note to reassess for continued therapy in April of 22 and was instructed to self report for signs and symptoms of recurrence.    Just had PSA, CBC and CMP    Transferred to scheduling to get Walter in within the week (April was recommended by Dr. Winters).    Routing to Dr. Winters and Aspen Escamilla to advise

## 2022-03-23 NOTE — ORAL ONC MGMT
Oral Chemotherapy Monitoring Program  Lab Follow Up    Reviewed lab results from 3/22/22    ORAL CHEMOTHERAPY 1/10/2022 1/14/2022 2/9/2022 2/22/2022 2/24/2022 3/2/2022 3/23/2022   Assessment Type Refill Lab Monitoring Refill Left Voicemail Incoming phone call;Initial Follow up;Other Refill Lab Monitoring   Diagnosis Code Prostate Cancer Prostate Cancer Prostate Cancer Prostate Cancer Prostate Cancer Prostate Cancer Prostate Cancer   Providers Dr. Singh Winters   [facility]   Drug Name Xtandi (enzalutamide) Xtandi (enzalutamide) Xtandi (enzalutamide) Xtandi (enzalutamide) Xtandi (enzalutamide) Xtandi (enzalutamide) Xtandi (enzalutamide)   Dose 160 mg 160 mg 160 mg 160 mg 160 mg 160 mg 160 mg   Current Schedule Daily Daily Daily Daily Daily Daily Daily   Cycle Details Continuous Continuous Continuous Continuous Continuous Continuous Continuous   Start Date of Last Cycle - - - - - - -   Planned next cycle start date 1/16/2022 - - - - - -   Doses missed in last 2 weeks - - - - 0 - 1   Adherence Assessment - - - - Adherent - Adherent   Adverse Effects - - - - Other (See Note for Details) - Other (See Note for Details)   Fatigue - - - - - - -   Pharmacist Intervention(fatigue) - - - - - - -   Other (See Note for Details) - - - - - - no   Pharmacist intervention(other) - - - - Yes - -   Intervention(s) - - - - Patient education - -   Home BPs - - - - - - -   Any new drug interactions? - - - - No - No   Pharmacist Intervention? - - - - - - -   Intervention(s) - - - - - - -   Is the dose as ordered appropriate for the patient? - - - - - - -   Is the patient currently in pain? - - - - - - Yes   Has the patient missed any days of school, work, or other routine activity? - - - - - - -   Since the last time we talked, have you been hospitalized or used the emergency room? - - - - - - -       Labs:  _  Result Component  Current Result Ref Range   Sodium 140 (3/22/2022) 133 - 144 mmol/L     _  Result Component Current Result Ref Range   Potassium 4.7 (3/22/2022) 3.4 - 5.3 mmol/L     _  Result Component Current Result Ref Range   Calcium 9.4 (3/22/2022) 8.5 - 10.1 mg/dL     No results found for Mag within last 30 days.     No results found for Phos within last 30 days.     _  Result Component Current Result Ref Range   Albumin 3.7 (3/22/2022) 3.4 - 5.0 g/dL     _  Result Component Current Result Ref Range   Urea Nitrogen 14 (3/22/2022) 7 - 30 mg/dL     _  Result Component Current Result Ref Range   Creatinine 1.00 (3/22/2022) 0.66 - 1.25 mg/dL     _  Result Component Current Result Ref Range   AST 18 (3/22/2022) 0 - 45 U/L     _  Result Component Current Result Ref Range   ALT 21 (3/22/2022) 0 - 70 U/L     _  Result Component Current Result Ref Range   Bilirubin Total 0.4 (3/22/2022) 0.2 - 1.3 mg/dL     _  Result Component Current Result Ref Range   WBC Count 6.6 (3/22/2022) 4.0 - 11.0 10e3/uL     _  Result Component Current Result Ref Range   Hemoglobin 13.0 (L) (3/22/2022) 13.3 - 17.7 g/dL     _  Result Component Current Result Ref Range   Platelet Count 239 (3/22/2022) 150 - 450 10e3/uL     No results found for ANC within last 30 days.     _  Result Component Current Result Ref Range   Absolute Neutrophils 3.7 (3/22/2022) 1.6 - 8.3 10e3/uL        Assessment & Plan:  No concerning abnormalities. Walter had called in to the triage line earlier today to report increased lower back pain and that PSA trending up. Message has been forwarded to care team. Walter reports the pain varies but he would rate it around a 4-5, he is taking tylenol but the pain never entirely goes away - it is constant. He believes his lower testosterone, his bodies inability to utilize testosterone and the tumor burden localized to that area are all contributing factors. Otherwise than the lower back pain, Walter reports doing well on the Xtandi and reports  only missing 1 dose in the last two weeks. Walter will plan to continue Xtandi as prescribed and will await an update from the care team re: his triage call from earlier today.    Follow-Up:  Await for update from Dr Winters re: triage call.    Irena Jade, PharmD, [facility]  Oral Chemotherapy Monitoring Program  [facility]  [phone]

## 2022-03-24 ENCOUNTER — PATIENT OUTREACH (OUTPATIENT)
Dept: ONCOLOGY | Facility: CLINIC | Age: 62
End: 2022-03-24
Payer: COMMERCIAL

## 2022-03-24 NOTE — PROGRESS NOTES
TC to pt returning his call regarding making an appt for evaluation of low back pain. Pt has seen a chiropractor without relief from their therapy and recommended stretches for piriformis syndrome. Pt unable to take NSAIDs as they trigger diverticulitis. Tylenol provides minimal relief. Recommended pt try diclofenac gel and get in for an [facility] visit at [facility] for evaluation and possible referrals to ortho/PT. Pt stated understanding of all and agreed to call clinic with any questions or concerns. Message sent to scheduling to set up TAHMINA visit with Veda Greenwood on 03/28 and to notify pt.

## 2022-03-24 NOTE — PROGRESS NOTES
"MEDICAL ONCOLOGY FOLLOW-UP NOTE  Mar 28, 2022    REASON FOR VISIT: Follow-up for metastatic hormone-sensitive prostate cancer, currently on ADT + Xtandi.    HISTORY OF PRESENT ILLNESS: Mr. 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 His oncologic history is detailed below.     1/6/21  PSA = 0.29  3/31/21  PSA = 0.44  7/7/21  PSA = 0.47  11/2021  PSA = 1.05  -- Start XTANDI  12/16/21 PSA=0.22  2/11/22  PSA= 0.50  3/22/22  PSA=0.72    Interval History:  Walter notes increase in low back pain over the last 6 weeks. He has seen his chiropractor to try and work on piriformis muscles without any improvement. He is taking tylenol routinely which does help take the edge off but the variability of the pain makes it challenging to manage. He has some radiating pain down the left side of his leg and focal numbness on the outside of his lower leg. He also has some radiating discomfort down his right leg. The pain itself is central, low back. 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     Additionally, he feels overall weaker over the last 2 mo. Thinks Xtandi contributing.     ONCOLOGIC HISTORY:  1. 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 Naylor at [facility]. Prior PSA were 1.4 on 8/16/17, 2.4 on 6/28/16, 2.9 on 6/28/16, and as low as 0.4 on 3/26/2003.   - 1/08/2019: Consultation with Sarah Wilson CNP in Urology clinic. Repeat PSA 9.6.  - 1/16/2019: MRI prostate with contrast - \"This examination is characterized as PIRADS 5- very high probability. Clinically significant cancer is highly likely to be " "present. There is a large, invasive mass arising from the right peripheral zone and extending into the neurovascular bundle, seminal vesicle, and along the anterolateral right mesorectal fascia. Metastatic right external iliac lymph node. Metastatic lesion in the posterior/superior right acetabulum.\"  - 1/25/2019: CT abdomen and pelvis with IV contrast - \"1. Heterogeneous enhancing mass posteriorly in the upper pole of the right kidney measures 4.5 x 5.8 x 5.7 cm (AP by transverse by craniocaudal). It has a small nodular component extending posterior medially which abuts the right psoas muscle. This nodular extension measures 2.1 x 2.1 cm. Minimal stranding about the mass. No definite thrombus within the right renal vein. This renal mass is compatible with a renal cell carcinoma. A paraaortic lymph node situated immediately posterior to the left renal vein measures 1.1 cm in short axis, suspicious for metastasis. 2. A 2 cm right external iliac lymph node is also suspicious for metastases. 3. Multiple sclerotic osseous lesions suspicious for metastases. These include 0.8 and 0.6 cm sclerotic lesions laterally in the right iliac wing (images 53 and 62 respectively). Ill-defined groundglass density laterally in the right acetabulum measuring 1.7 x 1.2 cm corresponds with the lesion identified on MRI. A 0.4 cm groundglass density in the left acetabulum. Sclerotic lesion in the left femoral neck measures 0.9 x 1.6 cm (image 81). Sclerotic metastases would be more compatible with prostate metastases. 4. A 3 subcentimeter hepatic lesions are indeterminate. Metastases would be a consideration. These can be further characterized with liver MRI.\"  - 1/25/2019: NM bone scan - \"There is focal bony uptake in the left femoral neck, right acetabulum, right of midline at the S1 or L5 level of the spine, within multiple bilateral ribs, in the C7 or T1 level of the spine, and anteriorly within the skull. Findings are suspicious for " "metastases.\"  - 1/31/19: CT chest with contrast - \" No suspicious nodules in the chest.  Stable appearance of a 5.8 cm right renal mass concerning for renal carcinoma until proven otherwise.  Stable indeterminant subcentimeter hypodensities in the liver.  Multifocal osteoblastic metastasis including 2.5 cm lesion in the right fifth rib posteriorly and a 1.6 cm lesion in the right third rib posteriorly. No lytic lesions identified.\"  - 2/6/19: CT-guided right sclerotic fifth rib lesion biopsy - \"Metastatic carcinoma, consistent with prostate primary.  Immunohistochemical stains performed show the metastatic carcinoma stains positive for NKX3.1 (prostate marker), negative for STACIE 3 and PAX8, which supports the above diagnosis.\"  - 2/15/19: Started Casodex 50mg every day and consented for the biobanking protocol. 3/18/19 - stopped Casodex.  - 2/19/19: Case discussed in tumor board - recommendation for nephectomy for suspected malignant right renal mass.   - 2/26/19: Started Lupron 22.5mg every 3 months.   - 5/8/19: Started Docetaxel 75mg/m2 IV every 3 weeks. 06/18/19 - cycle 3, 7/10/19 - cycle 4, 07/31/19 - cycle 5, 08/21/19 - cycle 6.   - 10/2/19: Restaging CT CAP and NM Bone Scan showed improved osseous disease, no evidence of recurrent or new metastatic disease.  - 1/5/20: Restaging CT CAP and NM Bone Scan showed stable osseous disease, no evidence of recurrent or new metastatic disease.  - 4/6/20: NM bone scan with slightly improved uptake in known skeletal mets. CT C/A/P with contrast with stable osseous mets, no yusuf enlargement, no new visceral mets etc.  - 04/08/20: Zometa every 3 months.  - 10/5/20: CT C/A/P with contrast and NM bone scan - SD.   - 1/4/21: CT C/A/P with contrast and NM bone scan - SD but slight increase in right 5th rib tracer uptake and in posterior L5 sclerosis.   - 03/29/21: CT C/A/P with contrast - single new right sacral 8mm bone lesion (suspicious), other bone mets stable. No " "visceral/yusuf disease. Nephrectomy site without recurrence. NM bone scan - SD but \"increased uptake associated with the prior lesions of the lower  cervical spine, posterior right fourth rib and right L5 posterior arch elements. Otherwise unchanged uptake associated with the proximal left femur and left anterior fourth rib. Similar uptake of the left halux, possibly secondary to degenerative osteoarthritis or gout.\"      2. Stage III ([id number]), grade 3 of 4, clear-cell RCC of right kidney:  - Incidentally diagnosed as above.   - Underwent curative-intent robotic right radical nephrectomy with Dr. Wesley Cleveland on 3/19/19. No tumor spillage per op report.   - Path showed: \"Histologic Type: Clear cell renal cell carcinoma; Sarcomatoid Features: Not identified; Histologic Grade: Nucleolar grade 3 (WHO/ISUP). Extent Tumor Size: 4.3 cm. Microscopic Tumor Extension: Tumor extension into renal sinus (in vascular structures). Margins: Negative. Tumor Necrosis: Present; focal. Lymph-Vascular Invasion: Not identified.  Pathologic Staging (pTNM) Primary Tumor (pT): pT3a: Tumor extends into renal vein branches/renal sinus. Regional Lymph Nodes (pN): pNX. Number of Lymph Nodes Examined: 0 Distant Metastasis (pM): pM N/A.\"  - Restaging scans as above.    PAST MEDICAL HISTORY:  Past Medical History:   Diagnosis Date     Cancer of kidney, right (H)      Complication of anesthesia     slow wakeup      Malignant neoplasm of prostate metastatic to bone (H) 2/14/2019     Thyroid disease      PAST SURGICAL HISTORY:   Past Surgical History:   Procedure Laterality Date     CYSTOSCOPY      about 10 years ago     INSERT PORT VASCULAR ACCESS Right 5/2/2019    Procedure: Chest Port Placement;  Surgeon: Tate Burciaga PA-C;  Location: [facility] OR     IR CHEST PORT PLACEMENT > 5 YRS OF AGE  5/2/2019     LAPAROSCOPIC NEPHRECTOMY Right 3/19/2019    Procedure: Right laparoscopic radical nephrectomy;  Surgeon: Wesley Cleveland MD;  " Location: [facility] OR      SOCIAL HISTORY:   Social History     Tobacco Use     Smoking status: Never Smoker     Smokeless tobacco: Never Used   Substance Use Topics     Alcohol use: Not on file     Comment: wine - very rare     Drug use: No     FAMILY HISTORY:   Family History   Problem Relation Age of Onset     Diabetes Father      ALLERGIES:   Allergies   Allergen Reactions     Doxycycline GI Disturbance     CURRENT MEDICATIONS:   Current Outpatient Medications:      amLODIPine (NORVASC) 10 MG tablet, Take 1 tablet (10 mg) by mouth daily, Disp: 30 tablet, Rfl: 0     atorvastatin (LIPITOR) 20 MG tablet, Take 60 mg by mouth daily , Disp: , Rfl:      calcium citrate-vitamin D (CITRACAL) 315-250 MG-UNIT TABS per tablet, Take 650 mg by mouth 2 times daily , Disp: , Rfl:      cetirizine (ZYRTEC) 10 MG tablet, Take 10 mg by mouth as needed for allergies , Disp: , Rfl:      cycloSPORINE (RESTASIS) 0.05 % ophthalmic emulsion, Place 1 drop into both eyes 2 times daily , Disp: , Rfl:      doxycycline hyclate (VIBRA-TABS) 100 MG tablet, Take 100 mg by mouth daily Daily every other month, Disp: , Rfl:      enzalutamide (XTANDI) 40 MG capsule, Take 4 capsules (160 mg) by mouth daily, Disp: 120 capsule, Rfl: 0     fluticasone (FLONASE) 50 MCG/ACT nasal spray, Spray 2 sprays in nostril daily as needed , Disp: , Rfl:      Glucosamine-Chondroit-Vit C-Mn (GLUCOSAMINE 1500 COMPLEX) CAPS, Take 1 capsule by mouth daily, Disp: , Rfl:      levothyroxine (SYNTHROID/LEVOTHROID) 100 MCG tablet, Take 100 mcg by mouth daily, Disp: , Rfl:      loratadine (CLARITIN) 10 MG tablet, , Disp: , Rfl:      MAGNESIUM PO, Take 250 mg by mouth 2 times daily , Disp: , Rfl:      MEBENDAZOLE PO, Take 225 mg by mouth daily , Disp: , Rfl:      metFORMIN (GLUCOPHAGE) 500 MG tablet, Take 1,500 mg by mouth 2 times daily (with meals) 500 mg 1000 at PM, Disp: , Rfl:      Multiple Vitamins-Minerals (MULTIVITAMIN ADULT EXTRA C PO), Take 1 tablet by mouth every 24 hours,  Disp: , Rfl:      Nutritional Supplements (SALMON OIL) CAPS, Take 2 capsules by mouth daily , Disp: , Rfl:      omeprazole (PRILOSEC) 20 MG DR capsule, Take 20 mg by mouth daily, Disp: , Rfl:      prochlorperazine (COMPAZINE) 10 MG tablet, Take 1 tablet (10 mg) by mouth every 6 hours as needed for nausea or vomiting, Disp: 30 tablet, Rfl: 3     tamsulosin (FLOMAX) 0.4 MG capsule, Take 1 capsule (0.4 mg) by mouth daily, Disp: 30 capsule, Rfl: 3    PHYSICAL EXAMINATION:  VITALS: /83   Pulse 81   Temp 97.9  F (36.6  C) (Oral)   Wt 105.5 kg (232 lb 9.6 oz)   SpO2 98%   BMI 31.55 kg/m       General: No acute distress  HEENT: Sclera anicteric. Oral mucosa pink and moist.  No mucositis or thrush  Heart: Regular, rate, and rhythm  Lungs: Clear to ascultation bilaterally  Extremities: no lower extremity edema  MSK/neuro: + straight leg raise bilaterally. Sensation intact to touch. 5/5 strength. Ambulating without difficulty. No focal tenderness to lower portion of lumbar spine on exam.  Rash: none    ECOG PS 1.    LABORATORY DATA/IMAGIN/1/22  CT  IMPRESSION:  1.  Stable distribution of bony metastatic disease showing increased  sclerosis involving some lesions. Correlate with bone scanning for  further assessment.  2.  No new disease otherwise seen.  3.  Stable tiny pulmonary nodule in the right.  4.  Stable hiatal hernia.      BS  IMPRESSION:    1. New or more conspicuous focus of radiotracer activity in the  anterior left first rib, consistent with disease progression.  2. Remainder of the metastatic skeletal foci are similar to prior.    See top of note for PSA trend.     Most Recent 3 CBC's:Recent Labs   Lab Test 22  0739 22  1044 22  0808   WBC 6.6 5.8 8.1   HGB 13.0* 13.4 13.4   MCV 92 90 91    225 207     Most Recent 3 BMP's:  Recent Labs   Lab Test 22  0739 22  1044 22  0808    142 138   POTASSIUM 4.7 4.0 4.1   CHLORIDE 109 109 106   CO2 21 23 29   BUN  "14 14 14   CR 1.00 0.87 1.01   ANIONGAP 10 10 3   BETHANY 9.4 9.2 9.1   * 97 109*    Most Recent 2 LFT's:  Recent Labs   Lab Test 03/22/22  0739 02/11/22  1044   AST 18 17   ALT 21 22   ALKPHOS 82 71   BILITOTAL 0.4 0.4    Most Recent TSH and T4:  Recent Labs   Lab Test 07/07/21  0834   TSH 1.67     I reviewed the above labs today.      I personally reviewed labs and imaging today     ASSESSMENT & PLAN: Mr. Reyes is a delightful 62 year old gentleman with recently diagnosed metastatic castration sensitive prostate adenocarcinoma as well as an incidentally diagnosed stage III clear-cell renal cell carcinoma of the right kidney (s/p radical R nephrectomy 3/19/19), who is here for a follow-up visit after completion of docetaxel for prostate cancer.     1. Metastatic prostate cancer, with involvement of the bones and RPLN:   - Patient met the criteria for CHAARTED \"high-volume\" metastatic hormone-sensitive prostate cancer. He opted for docetaxel in combination with ADT (per CHACHRIS, [id number], STAMPEDE). Tolerated 6 cycles of docetaxel fairly well (5/8/19 through 8/21/19), with the exception of mild fatigue and mild neuropathy. Now on ADT and tolerating fairly well.  - Continue leuprolide 22.5mg every 3 months, last dosed in Feb  -PSA now rising, doubling time of ~2 mo. Will continue XTANDI, d/w Dr. Winters  - refer to Dr. Lizarraga with radiation of L5  -consider change to abiraterone/darolutamide if change of therapy warranted    2. Bone metastases:   - Noted to have osseous metastatic disease at the time of diagnosis. Last bone scan stable ex likely bone flare of L rib  - Encouraged to continue calcium and vitamin D supplementation.  - Will continue Zometa 4mg IV every 3 months, due May     3. Stage 3, UISS-high risk ccRCC:  - pproximately 40-50% risk of recurrence after definitive surgery.   - no clear evidence of residual disease at this time; the retroperitoneal lymphadenopathy is more consistent with " metastatic prostate cancer   - Continue active surveillance with self-reporting for signs/symptoms of recurrence (this was previously explained in detail) and periodic H&P and scans.     4. Acute low back pain - does have L5 met, will have him see Dr. Lizarraga for consideration of palliative radiation. Otherwise, recommend PT/symptom management which would be managed by PCP if concluded to not be from cancer. Reviewed red flag symptoms but do not think there is urgency for imaging at present.   -medrol dose pack, tylenol, tramadol, robaxin, and ice. Reviewed SE's  -PT referral     RTC early May for scans, labs, and Dr. Winters

## 2022-03-28 ENCOUNTER — ONCOLOGY VISIT (OUTPATIENT)
Dept: ONCOLOGY | Facility: CLINIC | Age: 62
End: 2022-03-28
Attending: NURSE PRACTITIONER
Payer: COMMERCIAL

## 2022-03-28 VITALS
WEIGHT: 232.6 LBS | DIASTOLIC BLOOD PRESSURE: 83 MMHG | BODY MASS INDEX: 31.55 KG/M2 | OXYGEN SATURATION: 98 % | HEART RATE: 81 BPM | TEMPERATURE: 97.9 F | SYSTOLIC BLOOD PRESSURE: 139 MMHG

## 2022-03-28 DIAGNOSIS — C61 MALIGNANT NEOPLASM OF PROSTATE METASTATIC TO BONE (H): Primary | ICD-10-CM

## 2022-03-28 DIAGNOSIS — C79.51 MALIGNANT NEOPLASM OF PROSTATE METASTATIC TO BONE (H): Primary | ICD-10-CM

## 2022-03-28 DIAGNOSIS — M54.42 ACUTE BILATERAL LOW BACK PAIN WITH LEFT-SIDED SCIATICA: ICD-10-CM

## 2022-03-28 PROCEDURE — 99215 OFFICE O/P EST HI 40 MIN: CPT | Performed by: NURSE PRACTITIONER

## 2022-03-28 PROCEDURE — G0463 HOSPITAL OUTPT CLINIC VISIT: HCPCS

## 2022-03-28 RX ORDER — TRAMADOL HYDROCHLORIDE 50 MG/1
50 TABLET ORAL EVERY 6 HOURS PRN
Qty: 30 TABLET | Refills: 1 | Status: SHIPPED | OUTPATIENT
Start: 2022-03-28 | End: 2022-04-19

## 2022-03-28 RX ORDER — METHOCARBAMOL 500 MG/1
500 TABLET, FILM COATED ORAL 4 TIMES DAILY PRN
Qty: 30 TABLET | Refills: 1 | Status: SHIPPED | OUTPATIENT
Start: 2022-03-28 | End: 2022-04-19

## 2022-03-28 RX ORDER — METHYLPREDNISOLONE 4 MG
TABLET, DOSE PACK ORAL
Qty: 21 TABLET | Refills: 0 | Status: SHIPPED | OUTPATIENT
Start: 2022-03-28 | End: 2022-08-02

## 2022-03-28 ASSESSMENT — PAIN SCALES - GENERAL: PAINLEVEL: MILD PAIN (3)

## 2022-03-28 NOTE — NURSING NOTE
Oncology Rooming Note    March 28, 2022 10:44 AM   Walter Reyes is a 62 year old male who presents for:    Chief Complaint   Patient presents with     Oncology Clinic Visit     Renal cell carcinoma      Initial Vitals: /83   Pulse 81   Temp 97.9  F (36.6  C) (Oral)   Wt 105.5 kg (232 lb 9.6 oz)   SpO2 98%   BMI 31.55 kg/m   Estimated body mass index is 31.55 kg/m  as calculated from the following:    Height as of 8/4/20: 1.829 m (6').    Weight as of this encounter: 105.5 kg (232 lb 9.6 oz). Body surface area is 2.32 meters squared.  Mild Pain (3) Comment: Data Unavailable   No LMP for male patient.  Allergies reviewed: Yes  Medications reviewed: Yes    Medications: Medication refills not needed today.  Pharmacy name entered into EPIC:    [facility] - [place], [place] - [address] [id number] - [place], [place] - [id number] [address] [facility] - [place], [place] - [address]    Clinical concerns: PSA has gone up, and pt reports pain in lower back. CNP was NOT notified.      Argelia Good, EMT

## 2022-04-01 DIAGNOSIS — C79.51 MALIGNANT NEOPLASM OF PROSTATE METASTATIC TO BONE (H): Primary | ICD-10-CM

## 2022-04-01 DIAGNOSIS — C61 MALIGNANT NEOPLASM OF PROSTATE METASTATIC TO BONE (H): Primary | ICD-10-CM

## 2022-04-04 ENCOUNTER — THERAPY VISIT (OUTPATIENT)
Dept: PHYSICAL THERAPY | Facility: CLINIC | Age: 62
End: 2022-04-04
Attending: NURSE PRACTITIONER
Payer: COMMERCIAL

## 2022-04-04 DIAGNOSIS — C79.51 MALIGNANT NEOPLASM OF PROSTATE METASTATIC TO BONE (H): ICD-10-CM

## 2022-04-04 DIAGNOSIS — M54.42 ACUTE LOW BACK PAIN WITH LEFT-SIDED SCIATICA: ICD-10-CM

## 2022-04-04 DIAGNOSIS — C61 MALIGNANT NEOPLASM OF PROSTATE METASTATIC TO BONE (H): ICD-10-CM

## 2022-04-04 DIAGNOSIS — M54.42 ACUTE BILATERAL LOW BACK PAIN WITH LEFT-SIDED SCIATICA: ICD-10-CM

## 2022-04-04 PROCEDURE — 97530 THERAPEUTIC ACTIVITIES: CPT | Mod: GP | Performed by: PHYSICAL THERAPIST

## 2022-04-04 PROCEDURE — 97161 PT EVAL LOW COMPLEX 20 MIN: CPT | Mod: GP | Performed by: PHYSICAL THERAPIST

## 2022-04-04 PROCEDURE — 97110 THERAPEUTIC EXERCISES: CPT | Mod: GP | Performed by: PHYSICAL THERAPIST

## 2022-04-04 NOTE — PROGRESS NOTES
Physical Therapy Initial Evaluation  Subjective:  The history is provided by the patient.   Patient Health History  Walter Reyes being seen for Low back/tailbone pain and diagnosed piriformis syndrome..     Problem began: 2/7/2022.   Problem occurred: No specific incident.   Pain is reported as 2/10 on pain scale.  General health as reported by patient is fair.  Pertinent medical history includes: cancer, numbness/tingling, overweight and weakness.     Medical allergies: none.   Surgeries include:  Other. Other surgery history details: Right kidney removed - cancer.    Current medications:  Bone density, high blood pressure medication, muscle relaxants, pain medication, steroids, thyroid medication and other. Other medications details: Meds for cancer treatment.    Current occupation .   Primary job tasks include:  Computer work and prolonged sitting.                  Therapist Generated HPI Evaluation  Problem details: Pt reporting L sided LBP and piriformis syndrome, comes and goes, R side also affected when walking longer distances. R leg will sometimes go tingling. Needs help to put his L sock on. Pain on the lateral aspect of his L foot and lower leg, described as an ache. .         Type of problem:  Lumbar.    This is a new condition.      Patient reports pain:  Lumbar spine left.  Pain is described as aching and is constant.  Pain radiates to:  Gluteals left, lower leg left and foot left. Pain is worse in the A.M..  Since onset symptoms are unchanged.  Associated symptoms:  Tingling and numbness (B feet (may be due to chemo) ). Symptoms are exacerbated by sitting and bending (sitting in soft chairs/couches)  and relieved by activity/movement, NSAID's and ice (standing, walking).  Special tests included:  MRI and CT scan.  Previous treatment includes chiropractic. There was mild improvement following previous treatment.  Restrictions due to condition include:  Working in normal job  without restrictions.                          Objective:  Standing Alignment:    Cervical/Thoracic:  Thoracic kyphosis increased and forward head                               Lumbar/SI Evaluation  ROM:    AROM Lumbar:   Flexion:          50%  Ext:                    50%   Side Bend:        Left:  75%    Right:  75%  Rotation:           Left:     Right:   Side Glide:        Left:     Right:           Lumbar Myotomes:    T12-L3 (Hip Flex):  Left: 4+    Right: 5-  L2-4 (Quads):  Left:  3+    Right:  4+  L4 (Ankle DF):  Left:  5-    Right:  5-          Lumbar Dermtomes:          L3 Left:  Normal-light touch     L3 Right:  Normal-light touch  L4 Left:  Hypo-light touch       L4 Right:  Normal-light touch  L5 Left:  Hypo-light touch     L5 Right:  Hypo-light touch  S1 Left:  Hypo-light touch     S1 Right:  Hypo-light touch  Neural Tension/Mobility:    Left side:  SLR positive.   Right side:   SLR positive.  Lumbar Palpation:    Tenderness present at Left:    Piriformis                                            Hip Evaluation  Hip PROM:  Hip PROM:  Left Hip:    Normal (discomfort with IR)  Right Hip:  Normal                                           General     ROS    Assessment/Plan:    Patient is a 62 year old male with lumbar complaints.    Patient has the following significant findings with corresponding treatment plan.                Diagnosis 1:  LBP/L hip pain  Pain -  self management, education and home program  Decreased ROM/flexibility - manual therapy, therapeutic exercise and home program  Decreased joint mobility - manual therapy, therapeutic exercise and home program  Decreased strength - therapeutic exercise, therapeutic activities and home program  Impaired balance - neuro re-education, therapeutic activities and home program  Impaired muscle performance - neuro re-education and home program  Decreased function - therapeutic activities and home program  Impaired posture - neuro re-education, therapeutic  activities and home program      Cumulative Therapy Evaluation is: Low complexity.    Previous and current functional limitations:  (See Goal Flow Sheet for this information)    Short term and Long term goals: (See Goal Flow Sheet for this information)     Communication ability:  Patient appears to be able to clearly communicate and understand verbal and written communication and follow directions correctly.  Treatment Explanation - The following has been discussed with the patient:   RX ordered/plan of care  Anticipated outcomes  Possible risks and side effects  This patient would benefit from PT intervention to resume normal activities.   Rehab potential is good.    Frequency:  1 X week, once daily  Duration:  for 8 weeks  Discharge Plan:  Achieve all LTG.  Independent in home treatment program.  Reach maximal therapeutic benefit.    Please refer to the daily flowsheet for treatment today, total treatment time and time spent performing 1:1 timed codes.

## 2022-04-07 NOTE — PROGRESS NOTES
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 He was previous treated with radiotherapy targeting C7 as outlined above. He presents today to discuss the role for radiation to relieve his lower back pain thought attributable to the growing L5 lesion.    Please see medical oncology notes for full oncologic history. In brief, he was found to have an elevated PSA of 9.1 on 18.  He was seen by an outside urologist and had an MRI of the prostate on 19 with PI-RADS 5 lesion extending from the right peripheral zone into the neurovascular bundle, seminal vesicle and along the anterolateral right mesorectal fascia. There was a metastatic right external iliac lymph node visible as well as a lesion in the posterior/superior right acetabulum. CT A/P on 19 demonstrated an incidental 4.5 x 5.8 x 5.7 cm right upper kidney mass as well as a 1.1 cm periaortic lymph node, 2 cm right external iliac lymph node, and multiple sclerotic osseous lesions suspicious for metastases. Bone scan on 19 demonstrated focal bony uptake in the left femoral neck, right acetabulum, right lumbosacral spine, multiple ribs, the skull, and in C7 and T1 vertebrae. CT-guided biopsy on 19 with the right fifth rib demonstrated metastatic prostate carcinoma.     He was started on ADT on 19 and underwent right robotic radical nephrectomy on 3/19/19. 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 For his  prostate cancer, he then received 6 cycles of docetaxel between 5/8/19 to 8/21/19. Subsequent surveillance imaging remained stable or improved until CT CAP on 3/29/21 demonstrated a new right sacral 8 mm bone lesion. CT CAP on 6/30/21 demonstrated further interval worsening of osseous metastatic disease with increase in size of several lesions and bone scan on 6/30/21 demonstrated a new right sacral metastasis and increased uptake in the lower cervical spine, right third rib, and right L5 locations. His PSA at that time was 0.47.    His PSA francisco javier to 1.05 in 11/2021, and he has since been on enzalutamide. His PSA was reduced to 0.22 but slowly francisco javier to 0.72 (3/22/22). In this interval, additional pelvic lesions declared while the aforementioned bony lesions progressed per restaging CT CAP and NM bone scans on 11/11/21 and 2/1/22. He has been having lower back pain radiating down his left leg and focal numbness on the outside of his lower leg. He also has some radiating discomfort down his right leg. His digits as well as the dorsal aspects of his feet are numb. He has overall normal bowel and bladder habits. He is taking tramadol for pain control, but the effective is no potent nor durable. He supplements it with OTC tylenol and cannot take NSAIDS more for concern over inducing diverticulitis but also to avoid nephrotoxin given he has one kidney.          PHYSICAL EXAM:  VITALS: There were no vitals taken for this visit.  GEN: Appears well, alert, oriented, and in no acute distress  HEENT: Extraocular muscles intact, normal conjunctiva, mucosa membrance moist.  NECK: Full range of motion, no cervical or supraclavicular lymphadenopathy  CV: Good distal perfusion  RESP: Breathing comfortably on room air  ABDOMEN: Soft, non-tender, non-distended  SKIN: Normal color and turgor  NEURO: No focal deficits, CN III-XII grossly intact, normal and symmetric motor exam in bilateral upper and lower extremities, normal gait  PSYCH:  Appropriate mood and affect    LABS AND IMAGING:  CT CAP 11/11/21      CT CAP 2/1/22    IMPRESSION:   Mr. 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      PLAN:   We recommend palliative radiotherapy to relief his symptoms and provide local control. His treatment will consist of 5 once-daily treatments. The technical aspects, logistics and side effects associated with treatment were reviewed. Informed consent was obtained bofre Mr. Reyes underwent simulation CT. We will aim to start him on treatment early next week as he would like to wean off tramadol. We briefly discussed the utility of a short course of steroid as a temporizing measure should he develop excessive pain.     Mr. Reyes was seen and discussed with staff, Dr. Lizarraga.    Anuj Ariza MD  Resident, PGY-4   Department of Radiation Oncology   [facility]      I saw the patient with the resident.  I agree with the resident's note and plan of care.      YVAN Lizarraga M.D.  Department of Radiation Oncology  [facility]

## 2022-04-08 ENCOUNTER — OFFICE VISIT (OUTPATIENT)
Dept: RADIATION ONCOLOGY | Facility: CLINIC | Age: 62
End: 2022-04-08
Attending: RADIOLOGY
Payer: COMMERCIAL

## 2022-04-08 VITALS
WEIGHT: 233.5 LBS | DIASTOLIC BLOOD PRESSURE: 59 MMHG | BODY MASS INDEX: 31.67 KG/M2 | HEART RATE: 76 BPM | SYSTOLIC BLOOD PRESSURE: 132 MMHG

## 2022-04-08 DIAGNOSIS — C61 MALIGNANT NEOPLASM OF PROSTATE METASTATIC TO BONE (H): Primary | ICD-10-CM

## 2022-04-08 DIAGNOSIS — C79.51 MALIGNANT NEOPLASM OF PROSTATE METASTATIC TO BONE (H): Primary | ICD-10-CM

## 2022-04-08 PROCEDURE — 99215 OFFICE O/P EST HI 40 MIN: CPT | Mod: 25 | Performed by: RADIOLOGY

## 2022-04-08 PROCEDURE — G0463 HOSPITAL OUTPT CLINIC VISIT: HCPCS | Mod: 25 | Performed by: RADIOLOGY

## 2022-04-08 PROCEDURE — 77263 THER RADIOLOGY TX PLNG CPLX: CPT | Performed by: RADIOLOGY

## 2022-04-08 PROCEDURE — 77334 RADIATION TREATMENT AID(S): CPT | Performed by: RADIOLOGY

## 2022-04-08 PROCEDURE — 77334 RADIATION TREATMENT AID(S): CPT | Mod: 26 | Performed by: RADIOLOGY

## 2022-04-08 PROCEDURE — 77290 THER RAD SIMULAJ FIELD CPLX: CPT | Mod: 26 | Performed by: RADIOLOGY

## 2022-04-08 PROCEDURE — 77290 THER RAD SIMULAJ FIELD CPLX: CPT | Performed by: RADIOLOGY

## 2022-04-08 NOTE — LETTER
2022     RE: Walter Reyes  [id number]  [place] [id number]    Dear Colleague,    Thank you for referring your patient, Walter Reyes, to the [facility] RADIATION ONCOLOGY. Please see a copy of my visit note below.    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 He was previous treated with radiotherapy targeting C7 as outlined above. He presents today to discuss the role for radiation to relieve his lower back pain thought attributable to the growing L5 lesion.    Please see medical oncology notes for full oncologic history. In brief, he was found to have an elevated PSA of 9.1 on 18.  He was seen by an outside urologist and had an MRI of the prostate on 19 with PI-RADS 5 lesion extending from the right peripheral zone into the neurovascular bundle, seminal vesicle and along the anterolateral right mesorectal fascia. There was a metastatic right external iliac lymph node visible as well as a lesion in the posterior/superior right acetabulum. CT A/P on 19 demonstrated an incidental 4.5 x 5.8 x 5.7 cm right upper kidney mass as well as a 1.1 cm periaortic lymph node, 2 cm right external iliac lymph node, and multiple sclerotic osseous lesions suspicious for metastases. Bone scan on 19 demonstrated focal bony uptake in the left femoral neck, right acetabulum, right lumbosacral spine, multiple ribs, the skull, and in C7 and T1 vertebrae. CT-guided biopsy on 19 with the right fifth rib  demonstrated metastatic prostate carcinoma.     He was started on ADT on 2/5/19 and underwent right robotic radical nephrectomy on 3/19/19. 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 For his prostate cancer, he then received 6 cycles of docetaxel between 5/8/19 to 8/21/19. Subsequent surveillance imaging remained stable or improved until CT CAP on 3/29/21 demonstrated a new right sacral 8 mm bone lesion. CT CAP on 6/30/21 demonstrated further interval worsening of osseous metastatic disease with increase in size of several lesions and bone scan on 6/30/21 demonstrated a new right sacral metastasis and increased uptake in the lower cervical spine, right third rib, and right L5 locations. His PSA at that time was 0.47.    His PSA francisco javier to 1.05 in 11/2021, and he has since been on enzalutamide. His PSA was reduced to 0.22 but slowly francisco javier to 0.72 (3/22/22). In this interval, additional pelvic lesions declared while the aforementioned bony lesions progressed per restaging CT CAP and NM bone scans on 11/11/21 and 2/1/22. He has been having lower back pain radiating down his left leg and focal numbness on the outside of his lower leg. He also has some radiating discomfort down his right leg. His digits as well as the dorsal aspects of his feet are numb. He has overall normal bowel and bladder habits. He is taking tramadol for pain control, but the effective is no potent nor durable. He supplements it with OTC tylenol and cannot take NSAIDS more for concern over inducing diverticulitis but also to avoid nephrotoxin given he has one kidney.          PHYSICAL EXAM:  VITALS: There were no vitals taken for this visit.  GEN: Appears well, alert, oriented, and in no acute distress  HEENT: Extraocular muscles intact, normal conjunctiva, mucosa membrance moist.  NECK: Full range of motion, no cervical or supraclavicular lymphadenopathy  CV: Good  distal perfusion  RESP: Breathing comfortably on room air  ABDOMEN: Soft, non-tender, non-distended  SKIN: Normal color and turgor  NEURO: No focal deficits, CN III-XII grossly intact, normal and symmetric motor exam in bilateral upper and lower extremities, normal gait  PSYCH: Appropriate mood and affect    LABS AND IMAGING:  CT CAP 21      CT CAP 22    IMPRESSION:   Mr. 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      PLAN:   We recommend palliative radiotherapy to relief his symptoms and provide local control. His treatment will consist of 5 once-daily treatments. The technical aspects, logistics and side effects associated with treatment were reviewed. Informed consent was obtained bofre Mr. Reyes underwent simulation CT. We will aim to start him on treatment early next week as he would like to wean off tramadol. We briefly discussed the utility of a short course of steroid as a temporizing measure should he develop excessive pain.     Mr. Reyes was seen and discussed with staff, Dr. Lizarraga.    Anuj Ariza MD  Resident, PGY-4   Department of Radiation Oncology   [facility]      I saw the patient with the resident.  I agree with the resident's note and plan of care.      YVAN Lizarraga M.D.  Department of Radiation Oncology  [facility]    FOLLOW-UP VISIT    Patient Name: Walter Reyes      : 1960     Age: 62 year old        ______________________________________________________________________________     Chief Complaint   Patient presents with     Cancer     Follow up:Prostate Cancer: C7 3000 cGy completed 21     Pain  Denies    Labs  Other Labs:  Yes: 03/22/22    Imaging  None        PSA:   PSA   Date Value Ref Range Status   07/07/2021 0.47 0 - 4 ug/L Final     Comment:     Assay Method:  Chemiluminescence using Siemens Vista analyzer   03/31/2021 0.44 0 - 4 ug/L Final     Comment:     Assay Method:  Chemiluminescence using Siemens Vista analyzer   01/06/2021 0.29 0 - 4 ug/L Final     Comment:     Assay Method:  Chemiluminescence using Siemens Vista analyzer   10/07/2020 0.29 0 - 4 ug/L Final     Comment:     Assay Method:  Chemiluminescence using Siemens Vista analyzer   07/13/2020 0.42 0 - 4 ug/L Final     Comment:     Assay Method:  Chemiluminescence using Siemens Vista analyzer   04/08/2020 0.41 0 - 4 ug/L Final     Comment:     Assay Method:  Chemiluminescence using Siemens Vista analyzer   01/06/2020 0.44 0 - 4 ug/L Final     Comment:     Assay Method:  Chemiluminescence using Siemens Vista analyzer   10/02/2019 0.58 0 - 4 ug/L Final     Comment:     Assay Method:  Chemiluminescence using Siemens Vista analyzer   08/21/2019 0.71 0 - 4 ug/L Final     Comment:     Assay Method:  Chemiluminescence using Siemens Vista analyzer   07/31/2019 0.95 0 - 4 ug/L Final     Comment:     Assay Method:  Chemiluminescence using Siemens Vista analyzer     PSA Tumor Marker   Date Value Ref Range Status   03/22/2022 0.72 0.00 - 4.00 ug/L Final   02/11/2022 0.50 0.00 - 4.00 ug/L Final   12/16/2021 0.22 0.00 - 4.00 ug/L Final   11/15/2021 1.05 0.00 - 4.00 ug/L Final     Testosterone Level:   Testosterone Total   Date Value Ref Range Status   07/07/2021 6 (L) 240 - 950 ng/dL Final     Comment:     This test was developed and its performance characteristics determined by the   [facility].   It has not been cleared or approved by the FDA. The laboratory is regulated   under CLIA as qualified to perform high-complexity testing. This test is used   for clinical purposes. It should not be regarded as  investigational or for   research.     03/31/2021 11 (L) 240 - 950 ng/dL Final     Comment:     This test was developed and its performance characteristics determined by the   [facility].   It has not been cleared or approved by the FDA. The laboratory is regulated   under CLIA as qualified to perform high-complexity testing. This test is used   for clinical purposes. It should not be regarded as investigational or for   research.     01/06/2021 8 (L) 240 - 950 ng/dL Final     Comment:     This test was developed and its performance characteristics determined by the   [facility].   It has not been cleared or approved by the FDA. The laboratory is regulated   under CLIA as qualified to perform high-complexity testing. This test is used   for clinical purposes. It should not be regarded as investigational or for   research.     10/07/2020 12 (L) 240 - 950 ng/dL Final     Comment:     This test was developed and its performance characteristics determined by the   [facility].   It has not been cleared or approved by the FDA. The laboratory is regulated   under CLIA as qualified to perform high-complexity testing. This test is used   for clinical purposes. It should not be regarded as investigational or for   research.     07/13/2020 13 (L) 240 - 950 ng/dL Final     Comment:     This test was developed and its performance characteristics determined by the   [facility].   It has not been cleared or approved by the FDA. The laboratory is regulated   under CLIA as qualified to perform high-complexity testing. This test is used   for clinical purposes. It should not be regarded as investigational or for   research.     04/08/2020 8 (L) 240 - 950 ng/dL Final      Comment:     This test was developed and its performance characteristics determined by the   [facility].   It has not been cleared or approved by the FDA. The laboratory is regulated   under CLIA as qualified to perform high-complexity testing. This test is used   for clinical purposes. It should not be regarded as investigational or for   research.     01/06/2020 16 (L) 240 - 950 ng/dL Final     Comment:     This test was developed and its performance characteristics determined by the   [facility].   It has not been cleared or approved by the FDA. The laboratory is regulated   under CLIA as qualified to perform high-complexity testing. This test is used   for clinical purposes. It should not be regarded as investigational or for   research.     10/02/2019 16 (L) 240 - 950 ng/dL Final     Comment:     This test was developed and its performance characteristics determined by the   [facility],  [facility]. It has   not been cleared or approved by the FDA. The laboratory is regulated under   CLIA as qualified to perform high-complexity testing. This test is used for   clinical purposes. It should not be regarded as investigational or for   research.     08/21/2019 <2 (L) 240 - 950 ng/dL Final     Comment:     This test was developed and its performance characteristics determined by the   [facility],  [facility]. It has   not been cleared or approved by the FDA. The laboratory is regulated under   CLIA as qualified to perform high-complexity testing. This test is used for   clinical purposes. It should not be regarded as investigational or for   research.     07/31/2019 2 (L) 240 - 950 ng/dL Final     Comment:     This test was developed and its performance characteristics determined by the    [facility],  [facility]. It has   not been cleared or approved by the FDA. The laboratory is regulated under   CLIA as qualified to perform high-complexity testing. This test is used for   clinical purposes. It should not be regarded as investigational or for   research.         On-Study AUA Symptom Score (PQ)  AUA (American Urological Association) Symptom Score  1. Over the past month, how often have you had a sensation of not emptying your bladder completely after you finished urinating?: Less than half the time  2. Over the past month, how often have you had to urinate again less than two hours after you finished urinating?: About half the time  3. Over the past month, how often have you found you stopped and started again several times when you urinated?: Almost always  4. Over the past month, how often have you found it difficult to postpone urination?: Less than half the time  5. Over the past month, how often have you had a weak urine stream?: More than half the time  6. Over the past month, how often have you had to push or strain to begin urinating?: Not at all  7. Over the past month, how many times did you typically get up to urinate from the time you went to bed at night until the time you got up in the morning?: 4 times  The Disease-specific Quality of Life Question: If you were to spend the rest of your life with your urinary condition just the way it is now, how would you feel about that? (highlight or underline): Mixed  AUA Score: 20    Diarrhea: 0- None    Constipation: 0- None      Other Appointments:     MD Name:  Appointment Date:    MD Name: Appointment Date:   MD Name: Appointment Date:   Other Appointment Notes:     Residual Radiation side effect:      Additional Instructions:     Nurse face-to-face time: Level 5:  over 15 min face to face time    Again, thank you for allowing me to participate in the care of your patient.       Sincerely,    Brody Lizarraga MD

## 2022-04-08 NOTE — PROGRESS NOTES
FOLLOW-UP VISIT    Patient Name: Walter Reyes      : 1960     Age: 62 year old        ______________________________________________________________________________     Chief Complaint   Patient presents with     Cancer     Follow up:Prostate Cancer: C7 3000 cGy completed 21     Pain  Denies    Labs  Other Labs: Yes: 22    Imaging  None        PSA:   PSA   Date Value Ref Range Status   2021 0.47 0 - 4 ug/L Final     Comment:     Assay Method:  Chemiluminescence using Siemens Vista analyzer   2021 0.44 0 - 4 ug/L Final     Comment:     Assay Method:  Chemiluminescence using Siemens Vista analyzer   2021 0.29 0 - 4 ug/L Final     Comment:     Assay Method:  Chemiluminescence using Siemens Vista analyzer   10/07/2020 0.29 0 - 4 ug/L Final     Comment:     Assay Method:  Chemiluminescence using Siemens Vista analyzer   2020 0.42 0 - 4 ug/L Final     Comment:     Assay Method:  Chemiluminescence using Siemens Vista analyzer   2020 0.41 0 - 4 ug/L Final     Comment:     Assay Method:  Chemiluminescence using Siemens Vista analyzer   2020 0.44 0 - 4 ug/L Final     Comment:     Assay Method:  Chemiluminescence using Siemens Vista analyzer   10/02/2019 0.58 0 - 4 ug/L Final     Comment:     Assay Method:  Chemiluminescence using Siemens Vista analyzer   2019 0.71 0 - 4 ug/L Final     Comment:     Assay Method:  Chemiluminescence using Siemens Vista analyzer   2019 0.95 0 - 4 ug/L Final     Comment:     Assay Method:  Chemiluminescence using Siemens Vista analyzer     PSA Tumor Marker   Date Value Ref Range Status   2022 0.72 0.00 - 4.00 ug/L Final   2022 0.50 0.00 - 4.00 ug/L Final   2021 0.22 0.00 - 4.00 ug/L Final   11/15/2021 1.05 0.00 - 4.00 ug/L Final     Testosterone Level:   Testosterone Total   Date Value Ref Range Status   2021 6 (L) 240 - 950 ng/dL Final     Comment:     This test was developed and its performance  characteristics determined by the   [facility].   It has not been cleared or approved by the FDA. The laboratory is regulated   under CLIA as qualified to perform high-complexity testing. This test is used   for clinical purposes. It should not be regarded as investigational or for   research.     03/31/2021 11 (L) 240 - 950 ng/dL Final     Comment:     This test was developed and its performance characteristics determined by the   [facility].   It has not been cleared or approved by the FDA. The laboratory is regulated   under CLIA as qualified to perform high-complexity testing. This test is used   for clinical purposes. It should not be regarded as investigational or for   research.     01/06/2021 8 (L) 240 - 950 ng/dL Final     Comment:     This test was developed and its performance characteristics determined by the   [facility].   It has not been cleared or approved by the FDA. The laboratory is regulated   under CLIA as qualified to perform high-complexity testing. This test is used   for clinical purposes. It should not be regarded as investigational or for   research.     10/07/2020 12 (L) 240 - 950 ng/dL Final     Comment:     This test was developed and its performance characteristics determined by the   [facility].   It has not been cleared or approved by the FDA. The laboratory is regulated   under CLIA as qualified to perform high-complexity testing. This test is used   for clinical purposes. It should not be regarded as investigational or for   research.     07/13/2020 13 (L) 240 - 950 ng/dL Final     Comment:     This test was developed and its performance characteristics determined by the   [facility]  [facility].   It has not been cleared or approved by the FDA. The laboratory is regulated   under CLIA as qualified to perform high-complexity testing. This test is used   for clinical purposes. It should not be regarded as investigational or for   research.     04/08/2020 8 (L) 240 - 950 ng/dL Final     Comment:     This test was developed and its performance characteristics determined by the   [facility].   It has not been cleared or approved by the FDA. The laboratory is regulated   under CLIA as qualified to perform high-complexity testing. This test is used   for clinical purposes. It should not be regarded as investigational or for   research.     01/06/2020 16 (L) 240 - 950 ng/dL Final     Comment:     This test was developed and its performance characteristics determined by the   [facility].   It has not been cleared or approved by the FDA. The laboratory is regulated   under CLIA as qualified to perform high-complexity testing. This test is used   for clinical purposes. It should not be regarded as investigational or for   research.     10/02/2019 16 (L) 240 - 950 ng/dL Final     Comment:     This test was developed and its performance characteristics determined by the   [facility],  [facility]. It has   not been cleared or approved by the FDA. The laboratory is regulated under   CLIA as qualified to perform high-complexity testing. This test is used for   clinical purposes. It should not be regarded as investigational or for   research.     08/21/2019 <2 (L) 240 - 950 ng/dL Final     Comment:     This test was developed and its performance characteristics determined by the   [facility],  [facility]. It has   not been cleared or approved by the FDA.  The laboratory is regulated under   CLIA as qualified to perform high-complexity testing. This test is used for   clinical purposes. It should not be regarded as investigational or for   research.     07/31/2019 2 (L) 240 - 950 ng/dL Final     Comment:     This test was developed and its performance characteristics determined by the   [facility],  [facility]. It has   not been cleared or approved by the FDA. The laboratory is regulated under   CLIA as qualified to perform high-complexity testing. This test is used for   clinical purposes. It should not be regarded as investigational or for   research.         On-Study AUA Symptom Score (PQ)  AUA (American Urological Association) Symptom Score  1. Over the past month, how often have you had a sensation of not emptying your bladder completely after you finished urinating?: Less than half the time  2. Over the past month, how often have you had to urinate again less than two hours after you finished urinating?: About half the time  3. Over the past month, how often have you found you stopped and started again several times when you urinated?: Almost always  4. Over the past month, how often have you found it difficult to postpone urination?: Less than half the time  5. Over the past month, how often have you had a weak urine stream?: More than half the time  6. Over the past month, how often have you had to push or strain to begin urinating?: Not at all  7. Over the past month, how many times did you typically get up to urinate from the time you went to bed at night until the time you got up in the morning?: 4 times  The Disease-specific Quality of Life Question: If you were to spend the rest of your life with your urinary condition just the way it is now, how would you feel about that? (highlight or underline): Mixed  AUA Score: 20    Diarrhea: 0- None    Constipation: 0- None      Other Appointments:     MD Name:   Appointment Date:    MD Name: Appointment Date:   MD Name: Appointment Date:   Other Appointment Notes:     Residual Radiation side effect:      Additional Instructions:     Nurse face-to-face time: Level 5:  over 15 min face to face time

## 2022-04-11 ENCOUNTER — THERAPY VISIT (OUTPATIENT)
Dept: PHYSICAL THERAPY | Facility: CLINIC | Age: 62
End: 2022-04-11
Payer: COMMERCIAL

## 2022-04-11 DIAGNOSIS — M54.42 ACUTE LOW BACK PAIN WITH LEFT-SIDED SCIATICA: Primary | ICD-10-CM

## 2022-04-11 PROCEDURE — 97112 NEUROMUSCULAR REEDUCATION: CPT | Mod: GP | Performed by: PHYSICAL THERAPIST

## 2022-04-11 PROCEDURE — 97110 THERAPEUTIC EXERCISES: CPT | Mod: GP | Performed by: PHYSICAL THERAPIST

## 2022-04-12 ENCOUNTER — APPOINTMENT (OUTPATIENT)
Dept: RADIATION ONCOLOGY | Facility: CLINIC | Age: 62
End: 2022-04-12
Attending: RADIOLOGY
Payer: COMMERCIAL

## 2022-04-12 PROCEDURE — 77280 THER RAD SIMULAJ FIELD SMPL: CPT | Performed by: RADIOLOGY

## 2022-04-12 PROCEDURE — 77412 RADIATION TX DELIVERY LVL 3: CPT | Performed by: RADIOLOGY

## 2022-04-12 PROCEDURE — 77280 THER RAD SIMULAJ FIELD SMPL: CPT | Mod: 26 | Performed by: RADIOLOGY

## 2022-04-13 ENCOUNTER — APPOINTMENT (OUTPATIENT)
Dept: RADIATION ONCOLOGY | Facility: CLINIC | Age: 62
End: 2022-04-13
Attending: RADIOLOGY
Payer: COMMERCIAL

## 2022-04-13 PROCEDURE — 77412 RADIATION TX DELIVERY LVL 3: CPT | Performed by: RADIOLOGY

## 2022-04-14 ENCOUNTER — OFFICE VISIT (OUTPATIENT)
Dept: RADIATION ONCOLOGY | Facility: CLINIC | Age: 62
End: 2022-04-14
Attending: RADIOLOGY
Payer: COMMERCIAL

## 2022-04-14 DIAGNOSIS — C79.51 MALIGNANT NEOPLASM OF PROSTATE METASTATIC TO BONE (H): Primary | ICD-10-CM

## 2022-04-14 DIAGNOSIS — C61 MALIGNANT NEOPLASM OF PROSTATE METASTATIC TO BONE (H): Primary | ICD-10-CM

## 2022-04-14 PROCEDURE — 77417 THER RADIOLOGY PORT IMAGE(S): CPT | Performed by: RADIOLOGY

## 2022-04-14 PROCEDURE — 77412 RADIATION TX DELIVERY LVL 3: CPT | Performed by: RADIOLOGY

## 2022-04-14 NOTE — PROGRESS NOTES
WEEKLY MANAGEMENT NOTE  Radiation Oncology          Patient Name: Walter Reyes  MRN: [id number]       Pelvis  1500 cGy/ 2500 cGy   3/5         DAILY DOSE:    500  cGy/ day,  5 times/week        DISEASE UNDER TREATMENT: High burden metastatic prostate cancer.    SUBJECTIVE: The pain in improving.    CTC V5.0 Toxicity Criteria  Fatigue: Grade 1: Fatigue relieved by rest. This started with radiotherapy.  Pain Score:  3/10    :   Urinary Frequency/Urgency: Grade 0: No change from baseline  Urinary Incontinence: Grade 0: No change from baseline  Comment:    GI:  Diarrhea:Grade 0  No change over baseline  Proctitis: Grade 0 No symptom  Comment: The patient experienced nausea and had relief with Compazine      OBJECTIVE:There were no vitals taken for this visit.   Wt Readings from Last 2 Encounters:   04/08/22 105.9 kg (233 lb 8 oz)   03/28/22 105.5 kg (232 lb 9.6 oz)         IMPRESSION: The patient is tolerating the treatment.  The patient set up, dose, and portal imagings were reviewed.      PLAN: Complete radiotherapy with PRN follow up    PAIN MANAGEMENT PLAN: The patient will continue current pain medication    YVAN Lizarraga M.D.  Department of Radiation Oncology  [facility]

## 2022-04-14 NOTE — LETTER
4/14/2022     RE: Walter Reyes  [address] [address] [address]    Dear Colleague,    Thank you for referring your patient, Walter Reyes, to the [facility] RADIATION ONCOLOGY. Please see a copy of my visit note below.    WEEKLY MANAGEMENT NOTE  Radiation Oncology          Patient Name: Walter Reyes  MRN: [id number]       Pelvis  1500 cGy/ 2500 cGy   3/5         DAILY DOSE:     200  cGy/ day,  5 times/week        DISEASE UNDER TREATMENT: High burden metastatic prostate cancer.    SUBJECTIVE: The pain in improving.    CTC V5.0 Toxicity Criteria  Fatigue: Grade 1: Fatigue relieved by rest. This started with radiotherapy.  Pain Score:  3/10    :   Urinary Frequency/Urgency: Grade 0: No change from baseline  Urinary Incontinence: Grade 0: No change from baseline  Comment:    GI:  Diarrhea:Grade 0  No change over baseline  Proctitis: Grade 0 No symptom  Comment: The patient experienced nausea and had relief with Compazine      OBJECTIVE:There were no vitals taken for this visit.   Wt Readings from Last 2 Encounters:   04/08/22 105.9 kg (233 lb 8 oz)   03/28/22 105.5 kg (232 lb 9.6 oz)         IMPRESSION: The patient is tolerating the treatment.  The patient set up, dose, and portal imagings were reviewed.      PLAN: Complete radiotherapy with PRN follow up    PAIN MANAGEMENT PLAN: The patient will continue current pain medication    YVAN Lizarraga M.D.  Department of Radiation Oncology  [facility]

## 2022-04-15 ENCOUNTER — APPOINTMENT (OUTPATIENT)
Dept: RADIATION ONCOLOGY | Facility: CLINIC | Age: 62
End: 2022-04-15
Attending: RADIOLOGY
Payer: COMMERCIAL

## 2022-04-15 DIAGNOSIS — M54.42 ACUTE BILATERAL LOW BACK PAIN WITH LEFT-SIDED SCIATICA: ICD-10-CM

## 2022-04-15 DIAGNOSIS — C79.51 MALIGNANT NEOPLASM OF PROSTATE METASTATIC TO BONE (H): ICD-10-CM

## 2022-04-15 DIAGNOSIS — C61 MALIGNANT NEOPLASM OF PROSTATE METASTATIC TO BONE (H): ICD-10-CM

## 2022-04-15 PROCEDURE — 77412 RADIATION TX DELIVERY LVL 3: CPT | Performed by: RADIOLOGY

## 2022-04-15 NOTE — CONFIDENTIAL NOTE
Narcotic Refill Request    Medication(s) requested:  Robaxin and Tramadol Refills   Person Requesting Refill:Walter   What pain is the medication treating: lower back tail bone area getting radiation there this week   How is the medication being taken?:Takes tramadol every 6 hours, alternating  with tylenol every 6 hours   Does pt have enough for today? Yes   Is pain being adequately controlled on the current regimen?: yes   Experiencing any side effects from medication?: No     Date of most recent appointment:  3/28/22 Veda Greenwood   Any No Show Visits:No   Next appointment:   5/9/22 Dr Winters   Last fill date and by whom:  3/28/22 veda Greenwood    Reviewed: No Access     Routed provider: veda Greenwood.     Patient called [facility] and no refills remain on his Robaxin or Tramadol.

## 2022-04-16 ENCOUNTER — TELEPHONE (OUTPATIENT)
Dept: NURSING | Facility: CLINIC | Age: 62
End: 2022-04-16
Payer: COMMERCIAL

## 2022-04-16 NOTE — TELEPHONE ENCOUNTER
Patient called yesterday to try and get a new prescription for Tramadol and muscle relaxer.  Patient is calling today and is requesting Robaxin and Tramadol refills.  Patient states that his pain has increased due to radiation treatment.  Patient states that his pharmacy of choice is [facility] in [place].  Patient states that if his pain gets out of control he will go to [facility].  Please phone patient on Monday, April 18 th.      COVID 19 Nurse Triage Plan/Patient Instructions    Please be aware that novel coronavirus (COVID-19) may be circulating in the community. If you develop symptoms such as fever, cough, or SOB or if you have concerns about the presence of another infection including coronavirus (COVID-19), please contact your health care provider or visit [email or web address].     Disposition/Instructions    Home care recommended. Follow home care protocol based instructions.    Thank you for taking steps to prevent the spread of this virus.  o Limit your contact with others.  o Wear a simple mask to cover your cough.  o Wash your hands well and often.    Resources    [facility]: About COVID-19: [email or web address]    CDC: What to Do If You're Sick: www.cdc.gov/coronavirus/2019-ncov/about/steps-when-sick.html    CDC: Ending Home Isolation: www.cdc.gov/coronavirus/2019-ncov/hcp/disposition-in-home-patients.html     CDC: Caring for Someone: www.cdc.gov/coronavirus/2019-ncov/if-you-are-sick/care-for-someone.html     [facility]: Interim Guidance for Hospital Discharge to Home: www.health.[facility].[place].us/diseases/coronavirus/hcp/hospdischarge.pdf    [facility] clinical trials (COVID-19 research studies): clinicalaffairs.[facility].edu/umn-clinical-trials     Below are the COVID-19 hotlines at the [place] Department of Health ([facility]). Interpreters are available.   o For health questions: Call 159-805-4057 or 1-970.749.1191 (7 a.m. to 7 p.m.)  o For questions about schools and childcare:  Call 135-025-7415 or 1-550.971.7488 (7 a.m. to 7 p.m.)

## 2022-04-18 ENCOUNTER — ONCOLOGY VISIT (OUTPATIENT)
Dept: RADIATION ONCOLOGY | Facility: CLINIC | Age: 62
End: 2022-04-18

## 2022-04-18 ENCOUNTER — APPOINTMENT (OUTPATIENT)
Dept: RADIATION ONCOLOGY | Facility: CLINIC | Age: 62
End: 2022-04-18
Attending: RADIOLOGY
Payer: COMMERCIAL

## 2022-04-18 PROCEDURE — 77336 RADIATION PHYSICS CONSULT: CPT | Performed by: RADIOLOGY

## 2022-04-18 PROCEDURE — 77427 RADIATION TX MANAGEMENT X5: CPT | Performed by: RADIOLOGY

## 2022-04-18 PROCEDURE — 77412 RADIATION TX DELIVERY LVL 3: CPT | Performed by: RADIOLOGY

## 2022-04-19 RX ORDER — TRAMADOL HYDROCHLORIDE 50 MG/1
50 TABLET ORAL EVERY 6 HOURS PRN
Qty: 30 TABLET | Refills: 1 | Status: SHIPPED | OUTPATIENT
Start: 2022-04-19 | End: 2022-08-02

## 2022-04-19 RX ORDER — METHOCARBAMOL 500 MG/1
500 TABLET, FILM COATED ORAL 4 TIMES DAILY PRN
Qty: 30 TABLET | Refills: 1 | Status: SHIPPED | OUTPATIENT
Start: 2022-04-19 | End: 2022-08-02

## 2022-04-21 NOTE — PROCEDURES
Radiotherapy Treatment Summary          Date of Report: 2022     PATIENT: AZRA REYES  MEDICAL RECORD NO: [id number]  : 1960     DIAGNOSIS: C79.51 Secondary malignant neoplasm of bone  INTENT OF RADIOTHERAPY: Palliative  PATHOLOGY: Adenocarcinoma of prostate                                  STAGE: Metastatic  CONCURRENT SYSTEMIC THERAPY: Eligard and Xtandi                   Details of the treatments summarized below are found in records kept in the Department of Radiation Oncology at [facility].     Treatment Summary:  Radiation Oncology - Course: 2 Protocol:   Treatment Site Dose Modality From To Days Fx.  2 Sacrum  2,500 cGy 18 X  4/12/2022  2022   6  5          Dose per Fraction: 500 cGy       Total Dose: 2,500 cGy             COMMENTS:                         Mr. 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 He has   osseous diseases and abdominal lymphadenopathy, and was previous treated with radiotherapy targeting C7.   While on dual androgen deprivation therapy, his PSA slowly francisco javier from 0.22 to 0.72. In this interval, his disease   in the bony pelvis progressed. 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 Therefore, he was treated to a   course of palliative radiotherapy as outlined above. He tolerated it well, and found his pain improving towards   the end of his course.     ED visits/hospitalizations: No     Missed treatments: No     Acute Toxicity Profile by CTC v5.0: None     PAIN MANAGEMENT: as needed OTC acetaminophen                             FOLLOW UP PLAN:                          -We will see him on as needed basis.       Resident Physician: Anuj Ariza M.D.   Staff Physician: YVAN Lizarraga M.D.  Physicist: Prasanth Almazan, PhD     CC:   Jj Winters MD                                      Radiation Oncology:  [facility] [address] [place] [address]

## 2022-04-25 ENCOUNTER — THERAPY VISIT (OUTPATIENT)
Dept: PHYSICAL THERAPY | Facility: CLINIC | Age: 62
End: 2022-04-25
Payer: COMMERCIAL

## 2022-04-25 DIAGNOSIS — M54.42 ACUTE LOW BACK PAIN WITH LEFT-SIDED SCIATICA: Primary | ICD-10-CM

## 2022-04-25 PROCEDURE — 97110 THERAPEUTIC EXERCISES: CPT | Mod: GP | Performed by: PHYSICAL THERAPIST

## 2022-04-25 PROCEDURE — 97530 THERAPEUTIC ACTIVITIES: CPT | Mod: GP | Performed by: PHYSICAL THERAPIST

## 2022-05-02 ENCOUNTER — THERAPY VISIT (OUTPATIENT)
Dept: PHYSICAL THERAPY | Facility: CLINIC | Age: 62
End: 2022-05-02
Payer: COMMERCIAL

## 2022-05-02 DIAGNOSIS — M54.42 ACUTE LOW BACK PAIN WITH LEFT-SIDED SCIATICA: Primary | ICD-10-CM

## 2022-05-02 PROCEDURE — 97110 THERAPEUTIC EXERCISES: CPT | Mod: GP | Performed by: PHYSICAL THERAPIST

## 2022-05-05 ENCOUNTER — ONCOLOGY VISIT (OUTPATIENT)
Dept: INFUSION THERAPY | Facility: CLINIC | Age: 62
End: 2022-05-05

## 2022-05-05 ENCOUNTER — INFUSION THERAPY VISIT (OUTPATIENT)
Dept: INFUSION THERAPY | Facility: CLINIC | Age: 62
End: 2022-05-05
Attending: INTERNAL MEDICINE
Payer: COMMERCIAL

## 2022-05-05 ENCOUNTER — HOSPITAL ENCOUNTER (OUTPATIENT)
Dept: CT IMAGING | Facility: CLINIC | Age: 62
Discharge: HOME | End: 2022-05-05
Attending: NURSE PRACTITIONER
Payer: COMMERCIAL

## 2022-05-05 ENCOUNTER — RESEARCH ENCOUNTER (OUTPATIENT)
Dept: ONCOLOGY | Facility: CLINIC | Age: 62
End: 2022-05-05

## 2022-05-05 ENCOUNTER — HOSPITAL ENCOUNTER (OUTPATIENT)
Dept: NUCLEAR MEDICINE | Facility: CLINIC | Age: 62
Setting detail: NUCLEAR MEDICINE
Discharge: HOME | End: 2022-05-05
Attending: NURSE PRACTITIONER
Payer: COMMERCIAL

## 2022-05-05 VITALS
HEART RATE: 68 BPM | DIASTOLIC BLOOD PRESSURE: 79 MMHG | OXYGEN SATURATION: 98 % | TEMPERATURE: 99 F | SYSTOLIC BLOOD PRESSURE: 137 MMHG

## 2022-05-05 DIAGNOSIS — C64.1 RENAL CELL CARCINOMA, RIGHT (H): Primary | ICD-10-CM

## 2022-05-05 DIAGNOSIS — C79.51 MALIGNANT NEOPLASM OF PROSTATE METASTATIC TO BONE (H): Primary | ICD-10-CM

## 2022-05-05 DIAGNOSIS — C61 MALIGNANT NEOPLASM OF PROSTATE METASTATIC TO BONE (H): ICD-10-CM

## 2022-05-05 DIAGNOSIS — C79.51 MALIGNANT NEOPLASM OF PROSTATE METASTATIC TO BONE (H): ICD-10-CM

## 2022-05-05 DIAGNOSIS — C61 MALIGNANT NEOPLASM OF PROSTATE METASTATIC TO BONE (H): Primary | ICD-10-CM

## 2022-05-05 LAB
ALBUMIN SERPL-MCNC: 3.5 G/DL (ref 3.4–5)
ALP SERPL-CCNC: 76 U/L (ref 40–150)
ALT SERPL W P-5'-P-CCNC: 20 U/L (ref 0–70)
ANION GAP SERPL CALCULATED.3IONS-SCNC: 5 MMOL/L (ref 3–14)
AST SERPL W P-5'-P-CCNC: 15 U/L (ref 0–45)
BASOPHILS # BLD AUTO: 0 10E3/UL (ref 0–0.2)
BASOPHILS NFR BLD AUTO: 0 %
BILIRUB SERPL-MCNC: 0.4 MG/DL (ref 0.2–1.3)
BUN SERPL-MCNC: 10 MG/DL (ref 7–30)
CALCIUM SERPL-MCNC: 8.7 MG/DL (ref 8.5–10.1)
CHLORIDE BLD-SCNC: 106 MMOL/L (ref 94–109)
CO2 SERPL-SCNC: 26 MMOL/L (ref 20–32)
CREAT BLD-MCNC: 0.8 MG/DL (ref 0.7–1.3)
CREAT SERPL-MCNC: 0.74 MG/DL (ref 0.66–1.25)
EOSINOPHIL # BLD AUTO: 0.1 10E3/UL (ref 0–0.7)
EOSINOPHIL NFR BLD AUTO: 3 %
ERYTHROCYTE [DISTWIDTH] IN BLOOD BY AUTOMATED COUNT: 14.4 % (ref 10–15)
GFR SERPL CREATININE-BSD FRML MDRD: >60 ML/MIN/1.73M2
GFR SERPL CREATININE-BSD FRML MDRD: >90 ML/MIN/1.73M2
GLUCOSE BLD-MCNC: 96 MG/DL (ref 70–99)
HCT VFR BLD AUTO: 38.5 % (ref 40–53)
HGB BLD-MCNC: 12.8 G/DL (ref 13.3–17.7)
IMM GRANULOCYTES # BLD: 0 10E3/UL
IMM GRANULOCYTES NFR BLD: 1 %
LYMPHOCYTES # BLD AUTO: 1.1 10E3/UL (ref 0.8–5.3)
LYMPHOCYTES NFR BLD AUTO: 25 %
MCH RBC QN AUTO: 29.8 PG (ref 26.5–33)
MCHC RBC AUTO-ENTMCNC: 33.2 G/DL (ref 31.5–36.5)
MCV RBC AUTO: 90 FL (ref 78–100)
MONOCYTES # BLD AUTO: 0.4 10E3/UL (ref 0–1.3)
MONOCYTES NFR BLD AUTO: 10 %
NEUTROPHILS # BLD AUTO: 2.6 10E3/UL (ref 1.6–8.3)
NEUTROPHILS NFR BLD AUTO: 61 %
NRBC # BLD AUTO: 0 10E3/UL
NRBC BLD AUTO-RTO: 0 /100
PLATELET # BLD AUTO: 203 10E3/UL (ref 150–450)
POTASSIUM BLD-SCNC: 4.2 MMOL/L (ref 3.4–5.3)
PROT SERPL-MCNC: 6.7 G/DL (ref 6.8–8.8)
PSA SERPL-MCNC: 1.79 UG/L (ref 0–4)
RBC # BLD AUTO: 4.3 10E6/UL (ref 4.4–5.9)
SODIUM SERPL-SCNC: 137 MMOL/L (ref 133–144)
WBC # BLD AUTO: 4.2 10E3/UL (ref 4–11)

## 2022-05-05 PROCEDURE — 80053 COMPREHEN METABOLIC PANEL: CPT | Performed by: INTERNAL MEDICINE

## 2022-05-05 PROCEDURE — 84153 ASSAY OF PSA TOTAL: CPT | Performed by: INTERNAL MEDICINE

## 2022-05-05 PROCEDURE — 250N000011 HC RX IP 250 OP 636: Performed by: INTERNAL MEDICINE

## 2022-05-05 PROCEDURE — 82565 ASSAY OF CREATININE: CPT

## 2022-05-05 PROCEDURE — 96402 CHEMO HORMON ANTINEOPL SQ/IM: CPT

## 2022-05-05 PROCEDURE — 36591 DRAW BLOOD OFF VENOUS DEVICE: CPT

## 2022-05-05 PROCEDURE — 78306 BONE IMAGING WHOLE BODY: CPT

## 2022-05-05 PROCEDURE — 343N000001 HC RX 343: Performed by: NURSE PRACTITIONER

## 2022-05-05 PROCEDURE — 74177 CT ABD & PELVIS W/CONTRAST: CPT

## 2022-05-05 PROCEDURE — A9503 TC99M MEDRONATE: HCPCS | Performed by: NURSE PRACTITIONER

## 2022-05-05 PROCEDURE — 250N000009 HC RX 250: Performed by: NURSE PRACTITIONER

## 2022-05-05 PROCEDURE — 250N000011 HC RX IP 250 OP 636: Performed by: NURSE PRACTITIONER

## 2022-05-05 PROCEDURE — 258N000003 HC RX IP 258 OP 636: Performed by: INTERNAL MEDICINE

## 2022-05-05 PROCEDURE — 85004 AUTOMATED DIFF WBC COUNT: CPT | Performed by: INTERNAL MEDICINE

## 2022-05-05 RX ORDER — IOPAMIDOL 755 MG/ML
500 INJECTION, SOLUTION INTRAVASCULAR ONCE
Status: COMPLETED | OUTPATIENT
Start: 2022-05-05 | End: 2022-05-05

## 2022-05-05 RX ORDER — HEPARIN SODIUM,PORCINE 10 UNIT/ML
5 VIAL (ML) INTRAVENOUS
Status: CANCELLED | OUTPATIENT
Start: 2022-05-14

## 2022-05-05 RX ORDER — ZOLEDRONIC ACID 0.04 MG/ML
4 INJECTION, SOLUTION INTRAVENOUS ONCE
Status: CANCELLED | OUTPATIENT
Start: 2022-05-14 | End: 2022-05-14

## 2022-05-05 RX ORDER — HEPARIN SODIUM (PORCINE) LOCK FLUSH IV SOLN 100 UNIT/ML 100 UNIT/ML
SOLUTION INTRAVENOUS
Status: DISCONTINUED
Start: 2022-05-05 | End: 2022-05-05 | Stop reason: WASHOUT

## 2022-05-05 RX ORDER — HEPARIN SODIUM (PORCINE) LOCK FLUSH IV SOLN 100 UNIT/ML 100 UNIT/ML
5 SOLUTION INTRAVENOUS ONCE
Status: DISCONTINUED | OUTPATIENT
Start: 2022-05-05 | End: 2022-05-05 | Stop reason: CLARIF

## 2022-05-05 RX ORDER — HEPARIN SODIUM (PORCINE) LOCK FLUSH IV SOLN 100 UNIT/ML 100 UNIT/ML
5 SOLUTION INTRAVENOUS
Status: CANCELLED | OUTPATIENT
Start: 2022-05-14

## 2022-05-05 RX ORDER — TC 99M MEDRONATE 20 MG/10ML
25 INJECTION, POWDER, LYOPHILIZED, FOR SOLUTION INTRAVENOUS ONCE
Status: COMPLETED | OUTPATIENT
Start: 2022-05-05 | End: 2022-05-05

## 2022-05-05 RX ORDER — ZOLEDRONIC ACID 0.04 MG/ML
4 INJECTION, SOLUTION INTRAVENOUS ONCE
Status: COMPLETED | OUTPATIENT
Start: 2022-05-05 | End: 2022-05-05

## 2022-05-05 RX ORDER — HEPARIN SODIUM (PORCINE) LOCK FLUSH IV SOLN 100 UNIT/ML 100 UNIT/ML
5 SOLUTION INTRAVENOUS
Status: DISCONTINUED | OUTPATIENT
Start: 2022-05-05 | End: 2022-05-05 | Stop reason: HOSPADM

## 2022-05-05 RX ADMIN — SODIUM CHLORIDE 250 ML: 9 INJECTION, SOLUTION INTRAVENOUS at 12:42

## 2022-05-05 RX ADMIN — TC 99M MEDRONATE 25 MCI.: 20 INJECTION, POWDER, LYOPHILIZED, FOR SOLUTION INTRAVENOUS at 10:09

## 2022-05-05 RX ADMIN — ZOLEDRONIC ACID 4 MG: 0.04 INJECTION, SOLUTION INTRAVENOUS at 12:43

## 2022-05-05 RX ADMIN — SODIUM CHLORIDE 65 ML: 9 INJECTION, SOLUTION INTRAVENOUS at 10:38

## 2022-05-05 RX ADMIN — Medication 5 ML: at 13:05

## 2022-05-05 RX ADMIN — LEUPROLIDE ACETATE 22.5 MG: KIT at 12:54

## 2022-05-05 RX ADMIN — IOPAMIDOL 100 ML: 755 INJECTION, SOLUTION INTRAVENOUS at 10:38

## 2022-05-05 NOTE — PROGRESS NOTES
Infusion Nursing Note:  Walterjacinto Reyes presents today for Lupron and Zometa.    Patient seen by provider today: No   present during visit today: Not Applicable.    Note: Verified that he's taking his calcium and vitamin D.  Denies any recent or upcoming dental procedures.  OK to proceed with Zometa today ahead of bone scan per Dr. Lucas Escamilla.      Intravenous Access:  Implanted Port.    Treatment Conditions:  Lab Results   Component Value Date    HGB 12.8 (L) 05/05/2022    WBC 4.2 05/05/2022    ANEU 5.0 07/07/2021    ANEUTAUTO 2.6 05/05/2022     05/05/2022      Lab Results   Component Value Date     05/05/2022    POTASSIUM 4.2 05/05/2022    CR 0.74 05/05/2022    BETHANY 8.7 05/05/2022    BILITOTAL 0.4 05/05/2022    ALBUMIN 3.5 05/05/2022    ALT 20 05/05/2022    AST 15 05/05/2022     Results reviewed, labs MET treatment parameters, ok to proceed with treatment.      Post Infusion Assessment:  Patient tolerated infusion without incident.  Patient tolerated injection without incident.  Blood return noted pre and post infusion.  Site patent and intact, free from redness, edema or discomfort.  No evidence of extravasations.  Access discontinued per protocol.       Discharge Plan:   Discharge instructions reviewed with: Patient.  Patient and/or family verbalized understanding of discharge instructions and all questions answered.  AVS to patient via [employer].   Patient discharged in stable condition accompanied by: self.  Departure Mode: Ambulatory.      Parth Meyer RN

## 2022-05-05 NOTE — PROGRESS NOTES
[id number]: Study Visit Note   Subject name: Walter Reyes     Visit: Month 6    Did the study visit occur within the appropriate window allowed by the protocol? Yes    Patient met with  on 5/5/2022 for month 6 visit and completed all cognitive testing and questionnaires.     I have personally interviewed Walter Reyes and reviewed his medical record for concomitant medications and these have been recorded on the corresponding logs in Walter Reyes's research file.    Called Micheline, Walter's wife, to go through the informant questionnaire on 5/5/2022. Informant questionnaire was completed.    Walter Reyes was given the opportunity to ask any trial related questions.    Heather Niño, CRC

## 2022-05-05 NOTE — PROGRESS NOTES
Paged Dr. Winters and notified RNCC Aspen, to have Lupron orders signed and clear pt to receive Zometa. No return page received. Paged Hem 1 on call provider. Dr. Lucas Escamilla returned page, okay to give zometa as creatinine is WNL and will sign lupron orders.

## 2022-05-05 NOTE — LETTER
5/5/2022         RE: Walter Reyes  [address] [facility] [address]        Dear Colleague,    Thank you for referring your patient, Walter Reyes, to the [facility]. Please see a copy of my visit note below.    Paged Dr. Winters and notified LEW Louise, to have Lupron orders signed and clear pt to receive Zometa. No return page received. Paged Hem 1 on call provider. Dr. Lucas Escamilla returned page, okay to give zometa as creatinine is WNL and will sign lupron orders.       Again, thank you for allowing me to participate in the care of your patient.        Sincerely,        Heather Bhakta RN

## 2022-05-09 ENCOUNTER — ONCOLOGY VISIT (OUTPATIENT)
Dept: ONCOLOGY | Facility: CLINIC | Age: 62
End: 2022-05-09
Attending: INTERNAL MEDICINE
Payer: COMMERCIAL

## 2022-05-09 ENCOUNTER — TELEPHONE (OUTPATIENT)
Dept: ONCOLOGY | Facility: CLINIC | Age: 62
End: 2022-05-09
Payer: COMMERCIAL

## 2022-05-09 VITALS
HEIGHT: 71 IN | WEIGHT: 228.8 LBS | TEMPERATURE: 98.2 F | BODY MASS INDEX: 32.03 KG/M2 | SYSTOLIC BLOOD PRESSURE: 128 MMHG | OXYGEN SATURATION: 99 % | DIASTOLIC BLOOD PRESSURE: 88 MMHG | HEART RATE: 62 BPM

## 2022-05-09 DIAGNOSIS — C61 PROSTATE CANCER (H): Primary | ICD-10-CM

## 2022-05-09 PROCEDURE — 99214 OFFICE O/P EST MOD 30 MIN: CPT | Performed by: INTERNAL MEDICINE

## 2022-05-09 PROCEDURE — G0463 HOSPITAL OUTPT CLINIC VISIT: HCPCS

## 2022-05-09 ASSESSMENT — PAIN SCALES - GENERAL: PAINLEVEL: MILD PAIN (2)

## 2022-05-09 NOTE — NURSING NOTE
"Oncology Rooming Note    May 9, 2022 11:10 AM   Walter Reyes is a 62 year old male who presents for:    Chief Complaint   Patient presents with     Oncology Clinic Visit     Renal cell carcinoma, right      Initial Vitals: /88 (BP Location: Right arm, Patient Position: Sitting, Cuff Size: Adult Large)   Pulse 62   Temp 98.2  F (36.8  C) (Oral)   Ht 1.811 m (5' 11.28\")   Wt 103.8 kg (228 lb 12.8 oz)   SpO2 99%   BMI 31.66 kg/m   Estimated body mass index is 31.66 kg/m  as calculated from the following:    Height as of this encounter: 1.811 m (5' 11.28\").    Weight as of this encounter: 103.8 kg (228 lb 12.8 oz). Body surface area is 2.28 meters squared.  Mild Pain (2) Comment: Data Unavailable   No LMP for male patient.  Allergies reviewed: Yes  Medications reviewed: Yes    Medications: Medication refills not needed today.  Pharmacy name entered into EPIC:    [facility] - [place], [place] - [address] [id number] - [place], [place] - [address] [facility] - [place], [place] - [address]    Clinical concerns: None     Ok Uribe            "

## 2022-05-09 NOTE — PROGRESS NOTES
"May 9, 2022      [facility] Oncology Followup         REASON FOR VISIT: Follow-up for metastatic hormone-sensitive prostate cancer, currently on ADT + Xtandi.    HISTORY OF PRESENT ILLNESS: Mr. 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 His oncologic history is detailed below.     1/6/21  PSA = 0.29  3/31/21  PSA = 0.44  7/7/21  PSA = 0.47  11/2021  PSA = 1.05  -- Start XTANDI  12/16/21 PSA=0.22  2/11/22  PSA= 0.50  3/22/22  PSA=0.72  5/5/2022 PSA=1.79    Interval History:.     ONCOLOGIC HISTORY:  1. 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 Naylor at [facility]. Prior PSA were 1.4 on 8/16/17, 2.4 on 6/28/16, 2.9 on 6/28/16, and as low as 0.4 on 3/26/2003.   - 1/08/2019: Consultation with Sarah Wilson CNP in Urology clinic. Repeat PSA 9.6.  - 1/16/2019: MRI prostate with contrast - \"This examination is characterized as PIRADS 5- very high probability. Clinically significant cancer is highly likely to be present. There is a large, invasive mass arising from the right peripheral zone and extending into the neurovascular bundle, seminal vesicle, and along the anterolateral right mesorectal fascia. Metastatic right external iliac lymph node. Metastatic lesion in the posterior/superior right acetabulum.\"  - 1/25/2019: CT abdomen and pelvis with IV contrast - \"1. Heterogeneous enhancing mass posteriorly in the upper pole of the right kidney measures 4.5 x 5.8 x 5.7 cm (AP by transverse by craniocaudal). It has a small nodular component extending posterior medially which abuts the right psoas muscle. This nodular extension measures 2.1 x 2.1 cm. Minimal stranding about the mass. No definite " "thrombus within the right renal vein. This renal mass is compatible with a renal cell carcinoma. A paraaortic lymph node situated immediately posterior to the left renal vein measures 1.1 cm in short axis, suspicious for metastasis. 2. A 2 cm right external iliac lymph node is also suspicious for metastases. 3. Multiple sclerotic osseous lesions suspicious for metastases. These include 0.8 and 0.6 cm sclerotic lesions laterally in the right iliac wing (images 53 and 62 respectively). Ill-defined groundglass density laterally in the right acetabulum measuring 1.7 x 1.2 cm corresponds with the lesion identified on MRI. A 0.4 cm groundglass density in the left acetabulum. Sclerotic lesion in the left femoral neck measures 0.9 x 1.6 cm (image 81). Sclerotic metastases would be more compatible with prostate metastases. 4. A 3 subcentimeter hepatic lesions are indeterminate. Metastases would be a consideration. These can be further characterized with liver MRI.\"  - 1/25/2019: NM bone scan - \"There is focal bony uptake in the left femoral neck, right acetabulum, right of midline at the S1 or L5 level of the spine, within multiple bilateral ribs, in the C7 or T1 level of the spine, and anteriorly within the skull. Findings are suspicious for metastases.\"  - 1/31/19: CT chest with contrast - \" No suspicious nodules in the chest.  Stable appearance of a 5.8 cm right renal mass concerning for renal carcinoma until proven otherwise.  Stable indeterminant subcentimeter hypodensities in the liver.  Multifocal osteoblastic metastasis including 2.5 cm lesion in the right fifth rib posteriorly and a 1.6 cm lesion in the right third rib posteriorly. No lytic lesions identified.\"  - 2/6/19: CT-guided right sclerotic fifth rib lesion biopsy - \"Metastatic carcinoma, consistent with prostate primary.  Immunohistochemical stains performed show the metastatic carcinoma stains positive for NKX3.1 (prostate marker), negative for STACIE 3 and " "PAX8, which supports the above diagnosis.\"  - 2/15/19: Started Casodex 50mg every day and consented for the biobanking protocol. 3/18/19 - stopped Casodex.  - 2/19/19: Case discussed in tumor board - recommendation for nephectomy for suspected malignant right renal mass.   - 2/26/19: Started Lupron 22.5mg every 3 months.   - 5/8/19: Started Docetaxel 75mg/m2 IV every 3 weeks. 06/18/19 - cycle 3, 7/10/19 - cycle 4, 07/31/19 - cycle 5, 08/21/19 - cycle 6.   - 10/2/19: Restaging CT CAP and NM Bone Scan showed improved osseous disease, no evidence of recurrent or new metastatic disease.  - 1/5/20: Restaging CT CAP and NM Bone Scan showed stable osseous disease, no evidence of recurrent or new metastatic disease.  - 4/6/20: NM bone scan with slightly improved uptake in known skeletal mets. CT C/A/P with contrast with stable osseous mets, no yusuf enlargement, no new visceral mets etc.  - 04/08/20: Zometa every 3 months.  - 10/5/20: CT C/A/P with contrast and NM bone scan - SD.   - 1/4/21: CT C/A/P with contrast and NM bone scan - SD but slight increase in right 5th rib tracer uptake and in posterior L5 sclerosis.   - 03/29/21: CT C/A/P with contrast - single new right sacral 8mm bone lesion (suspicious), other bone mets stable. No visceral/yusuf disease. Nephrectomy site without recurrence. NM bone scan - SD but \"increased uptake associated with the prior lesions of the lower  cervical spine, posterior right fourth rib and right L5 posterior arch elements. Otherwise unchanged uptake associated with the proximal left femur and left anterior fourth rib. Similar uptake of the left halux, possibly secondary to degenerative osteoarthritis or gout.\"  -  START XTANDI  -  RT to L5 / S1    He continues to have some low back pain and numbness in the left leg.  He thinks this may be related to the radiation.  He had a chiropractor doing adjustments which seemed to help.  He has no urinary incontinence.  He has no " "significant numbness in the legs.    He has a cruise planned to [place] in June.      2. Stage III ([id number]), grade 3 of 4, clear-cell RCC of right kidney:  - Incidentally diagnosed as above.   - Underwent curative-intent robotic right radical nephrectomy with Dr. Wesley Cleveland on 3/19/19. No tumor spillage per op report.   - Path showed: \"Histologic Type: Clear cell renal cell carcinoma; Sarcomatoid Features: Not identified; Histologic Grade: Nucleolar grade 3 (WHO/ISUP). Extent Tumor Size: 4.3 cm. Microscopic Tumor Extension: Tumor extension into renal sinus (in vascular structures). Margins: Negative. Tumor Necrosis: Present; focal. Lymph-Vascular Invasion: Not identified.  Pathologic Staging (pTNM) Primary Tumor (pT): pT3a: Tumor extends into renal vein branches/renal sinus. Regional Lymph Nodes (pN): pNX. Number of Lymph Nodes Examined: 0 Distant Metastasis (pM): pM N/A.\"  - Restaging scans as above.    PAST MEDICAL HISTORY:  Past Medical History:   Diagnosis Date     Cancer of kidney, right (H)      Complication of anesthesia     slow wakeup      Malignant neoplasm of prostate metastatic to bone (H) 2/14/2019     Thyroid disease      PAST SURGICAL HISTORY:   Past Surgical History:   Procedure Laterality Date     CYSTOSCOPY      about 10 years ago     INSERT PORT VASCULAR ACCESS Right 5/2/2019    Procedure: Chest Port Placement;  Surgeon: Tate Burciaga PA-C;  Location:  OR     IR CHEST PORT PLACEMENT > 5 YRS OF AGE  5/2/2019     LAPAROSCOPIC NEPHRECTOMY Right 3/19/2019    Procedure: Right laparoscopic radical nephrectomy;  Surgeon: Wesley Cleveland MD;  Location:  OR      SOCIAL HISTORY:   Social History     Tobacco Use     Smoking status: Never Smoker     Smokeless tobacco: Never Used   Substance Use Topics     Drug use: No     FAMILY HISTORY:   Family History   Problem Relation Age of Onset     Diabetes Father      ALLERGIES:   Allergies   Allergen Reactions     Doxycycline GI " Disturbance     CURRENT MEDICATIONS:   Current Outpatient Medications:      amLODIPine (NORVASC) 10 MG tablet, Take 1 tablet (10 mg) by mouth daily, Disp: 30 tablet, Rfl: 0     atorvastatin (LIPITOR) 20 MG tablet, Take 60 mg by mouth daily , Disp: , Rfl:      calcium citrate-vitamin D (CITRACAL) 315-250 MG-UNIT TABS per tablet, Take 650 mg by mouth 2 times daily , Disp: , Rfl:      cetirizine (ZYRTEC) 10 MG tablet, Take 10 mg by mouth as needed for allergies  (Patient not taking: Reported on 5/5/2022), Disp: , Rfl:      cycloSPORINE (RESTASIS) 0.05 % ophthalmic emulsion, Place 1 drop into both eyes 2 times daily , Disp: , Rfl:      doxycycline hyclate (VIBRA-TABS) 100 MG tablet, Take 100 mg by mouth daily Daily every other month, Disp: , Rfl:      enzalutamide (XTANDI) 40 MG capsule, Take 4 capsules (160 mg) by mouth daily, Disp: 120 capsule, Rfl: 0     fluticasone (FLONASE) 50 MCG/ACT nasal spray, Spray 2 sprays in nostril daily as needed  (Patient not taking: Reported on 5/5/2022), Disp: , Rfl:      Glucosamine-Chondroit-Vit C-Mn (GLUCOSAMINE 1500 COMPLEX) CAPS, Take 1 capsule by mouth daily, Disp: , Rfl:      levothyroxine (SYNTHROID/LEVOTHROID) 112 MCG tablet, Take 112 mcg by mouth daily, Disp: , Rfl:      loratadine (CLARITIN) 10 MG tablet, , Disp: , Rfl:      MAGNESIUM PO, Take 250 mg by mouth 2 times daily , Disp: , Rfl:      MEBENDAZOLE PO, Take 225 mg by mouth daily , Disp: , Rfl:      metFORMIN (GLUCOPHAGE) 500 MG tablet, Take 1,500 mg by mouth 2 times daily (with meals) 500 mg 1000 at PM, Disp: , Rfl:      methocarbamol (ROBAXIN) 500 MG tablet, Take 1 tablet (500 mg) by mouth 4 times daily as needed for muscle spasms (Patient not taking: Reported on 5/5/2022), Disp: 30 tablet, Rfl: 1     methylPREDNISolone (MEDROL DOSEPAK) 4 MG tablet therapy pack, Follow Package Directions, Disp: 21 tablet, Rfl: 0     Multiple Vitamins-Minerals (MULTIVITAMIN ADULT EXTRA C PO), Take 1 tablet by mouth every 24 hours, Disp:  , Rfl:      Nutritional Supplements (SALMON OIL) CAPS, Take 2 capsules by mouth daily , Disp: , Rfl:      omeprazole (PRILOSEC) 20 MG DR capsule, Take 20 mg by mouth daily, Disp: , Rfl:      prochlorperazine (COMPAZINE) 10 MG tablet, Take 1 tablet (10 mg) by mouth every 6 hours as needed for nausea or vomiting (Patient not taking: Reported on 2022), Disp: 30 tablet, Rfl: 3     tamsulosin (FLOMAX) 0.4 MG capsule, Take 1 capsule (0.4 mg) by mouth daily, Disp: 30 capsule, Rfl: 3     traMADol (ULTRAM) 50 MG tablet, Take 1 tablet (50 mg) by mouth every 6 hours as needed for severe pain, Disp: 30 tablet, Rfl: 1    PHYSICAL EXAMINATION:  VITALS: There were no vitals taken for this visit.     General: No acute distress  HEENT: Sclera anicteric. Oral mucosa pink and moist.  No mucositis or thrush  Heart: Regular, rate, and rhythm  Lungs: Clear to ascultation bilaterally  Extremities: no lower extremity edema  MSK/neuro: + straight leg raise bilaterally. Sensation intact to touch. 5/5 strength. Ambulating without difficulty. No focal tenderness to lower portion of lumbar spine on exam.  Rash: none    ECOG PS 1.    LABORATORY DATA/IMAGIN/1/22  CT  IMPRESSION:  1.  Stable distribution of bony metastatic disease showing increased  sclerosis involving some lesions. Correlate with bone scanning for  further assessment.  2.  No new disease otherwise seen.  3.  Stable tiny pulmonary nodule in the right.  4.  Stable hiatal hernia.      BS    IMPRESSION:   1. New focus of increased uptake left superior acetabulum and distal  left femoral diaphysis.  2. Slight increased uptake in the left first rib.   3. Stable increased uptake in the other metastatic foci described  above.     See top of note for PSA trend.     Most Recent 3 CBC's:  Recent Labs   Lab Test 22  1114 22  0739 22  1044   WBC 4.2 6.6 5.8   HGB 12.8* 13.0* 13.4   MCV 90 92 90    239 225     Most Recent 3 BMP's:  Recent Labs   Lab Test  "05/05/22  1114 05/05/22  1049 03/22/22  0739 02/11/22  1044     --  140 142   POTASSIUM 4.2  --  4.7 4.0   CHLORIDE 106  --  109 109   CO2 26  --  21 23   BUN 10  --  14 14   CR 0.74 0.8 1.00 0.87   ANIONGAP 5  --  10 10   BETHANY 8.7  --  9.4 9.2   GLC 96  --  110* 97    Most Recent 2 LFT's:  Recent Labs   Lab Test 05/05/22  1114 03/22/22  0739   AST 15 18   ALT 20 21   ALKPHOS 76 82   BILITOTAL 0.4 0.4    Most Recent TSH and T4:  Recent Labs   Lab Test 07/07/21  0834   TSH 1.67     I reviewed the above labs today.      I personally reviewed labs and imaging today - including CT and bone scan. Two new lesions in the left hip are visible.      ASSESSMENT & PLAN: Mr. Reyes is a delightful 62 year old gentleman with recently diagnosed metastatic castration sensitive prostate adenocarcinoma as well as an incidentally diagnosed stage III clear-cell renal cell carcinoma of the right kidney (s/p radical R nephrectomy 3/19/19), who is here for a follow-up visit after completion of docetaxel for prostate cancer.     1. Metastatic prostate cancer, with involvement of the bones and RPLN:   - Patient met the criteria for CHAARTED \"high-volume\" metastatic hormone-sensitive prostate cancer. He opted for docetaxel in combination with ADT (per JOSEFA, [id number], STAMPEDE). Tolerated 6 cycles of docetaxel fairly well (5/8/19 through 8/21/19), with the exception of mild fatigue and mild neuropathy. Now on ADT and tolerating fairly well.  - Continue leuprolide 22.5mg every 3 months, last dosed in Feb  -PSA now rising, doubling time of ~2 mo. Will continue XTANDI, d/w Dr. Winters  - S/P radiation to  L5/S1  -Discontinue XTANDI  -Obtain blood for CFDNA  -START Docetaxel ( After Cruise. Plan is to start on July 11) -we can reconsider this if his symptoms worsen.  -I do not feel his symptoms are due to nerve impingement from tumor because they do not fall that typical pattern and he also does not have significant disease in " the upper spine.  It is possible that some of the lumbar nerves are being impinged in an area at L5-S1 however that area has been radiated.  This is part of the justification for moving forward with chemotherapy.    2. Bone metastases:   - Noted to have osseous metastatic disease at the time of diagnosis. Last bone scan stable ex likely bone flare of L rib  - Encouraged to continue calcium and vitamin D supplementation.  - Will continue Zometa 4mg IV every 3 months, due May     3. Stage 3, UISS-high risk ccRCC:  - pproximately 40-50% risk of recurrence after definitive surgery.   - no clear evidence of residual disease at this time; the retroperitoneal lymphadenopathy is more consistent with metastatic prostate cancer   - Continue active surveillance with self-reporting for signs/symptoms of recurrence (this was previously explained in detail) and periodic H&P and scans.     4. Diarrhea. Improving. Could check CDIFF however improving on its own.

## 2022-05-09 NOTE — TELEPHONE ENCOUNTER
"Need the following info from personnel who did lab draw:    -Sample needs to be entered in electronically in \"[employer]\".   -Label needs updating    [id number] Spoke to Delmy MEYER MA who will call Pamela back for labeling/collecting needs of lab.       "

## 2022-05-09 NOTE — LETTER
"    5/9/2022         RE: Walter Reyes  [id number] [facility] [address]        Dear Colleague,    Thank you for referring your patient, Walter Reyes, to the [facility]. Please see a copy of my visit note below.    May 9, 2022      [facility] Oncology Followup         REASON FOR VISIT: Follow-up for metastatic hormone-sensitive prostate cancer, currently on ADT + Xtandi.    HISTORY OF PRESENT ILLNESS: Mr. 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 His oncologic history is detailed below.     1/6/21  PSA = 0.29  3/31/21  PSA = 0.44  7/7/21  PSA = 0.47  11/2021  PSA = 1.05  -- Start XTANDI  12/16/21 PSA=0.22  2/11/22  PSA= 0.50  3/22/22  PSA=0.72  5/5/2022 PSA=1.79    Interval History:.     ONCOLOGIC HISTORY:  1. 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 Naylor at [facility]. Prior PSA were 1.4 on 8/16/17, 2.4 on 6/28/16, 2.9 on 6/28/16, and as low as 0.4 on 3/26/2003.   - 1/08/2019: Consultation with Sarah Wilson CNP in Urology clinic. Repeat PSA 9.6.  - 1/16/2019: MRI prostate with contrast - \"This examination is characterized as PIRADS 5- very high probability. Clinically significant cancer is highly likely to be present. There is a large, invasive mass arising from the right peripheral zone and extending into the neurovascular bundle, seminal vesicle, and along the anterolateral right mesorectal fascia. Metastatic right external iliac lymph node. Metastatic lesion in the posterior/superior right acetabulum.\"  - 1/25/2019: CT abdomen and pelvis with IV contrast - \"1. Heterogeneous enhancing mass posteriorly in the upper " "pole of the right kidney measures 4.5 x 5.8 x 5.7 cm (AP by transverse by craniocaudal). It has a small nodular component extending posterior medially which abuts the right psoas muscle. This nodular extension measures 2.1 x 2.1 cm. Minimal stranding about the mass. No definite thrombus within the right renal vein. This renal mass is compatible with a renal cell carcinoma. A paraaortic lymph node situated immediately posterior to the left renal vein measures 1.1 cm in short axis, suspicious for metastasis. 2. A 2 cm right external iliac lymph node is also suspicious for metastases. 3. Multiple sclerotic osseous lesions suspicious for metastases. These include 0.8 and 0.6 cm sclerotic lesions laterally in the right iliac wing (images 53 and 62 respectively). Ill-defined groundglass density laterally in the right acetabulum measuring 1.7 x 1.2 cm corresponds with the lesion identified on MRI. A 0.4 cm groundglass density in the left acetabulum. Sclerotic lesion in the left femoral neck measures 0.9 x 1.6 cm (image 81). Sclerotic metastases would be more compatible with prostate metastases. 4. A 3 subcentimeter hepatic lesions are indeterminate. Metastases would be a consideration. These can be further characterized with liver MRI.\"  - 1/25/2019: NM bone scan - \"There is focal bony uptake in the left femoral neck, right acetabulum, right of midline at the S1 or L5 level of the spine, within multiple bilateral ribs, in the C7 or T1 level of the spine, and anteriorly within the skull. Findings are suspicious for metastases.\"  - 1/31/19: CT chest with contrast - \" No suspicious nodules in the chest.  Stable appearance of a 5.8 cm right renal mass concerning for renal carcinoma until proven otherwise.  Stable indeterminant subcentimeter hypodensities in the liver.  Multifocal osteoblastic metastasis including 2.5 cm lesion in the right fifth rib posteriorly and a 1.6 cm lesion in the right third rib posteriorly. No lytic " "lesions identified.\"  - 2/6/19: CT-guided right sclerotic fifth rib lesion biopsy - \"Metastatic carcinoma, consistent with prostate primary.  Immunohistochemical stains performed show the metastatic carcinoma stains positive for NKX3.1 (prostate marker), negative for STACIE 3 and PAX8, which supports the above diagnosis.\"  - 2/15/19: Started Casodex 50mg every day and consented for the biobanking protocol. 3/18/19 - stopped Casodex.  - 2/19/19: Case discussed in tumor board - recommendation for nephectomy for suspected malignant right renal mass.   - 2/26/19: Started Lupron 22.5mg every 3 months.   - 5/8/19: Started Docetaxel 75mg/m2 IV every 3 weeks. 06/18/19 - cycle 3, 7/10/19 - cycle 4, 07/31/19 - cycle 5, 08/21/19 - cycle 6.   - 10/2/19: Restaging CT CAP and NM Bone Scan showed improved osseous disease, no evidence of recurrent or new metastatic disease.  - 1/5/20: Restaging CT CAP and NM Bone Scan showed stable osseous disease, no evidence of recurrent or new metastatic disease.  - 4/6/20: NM bone scan with slightly improved uptake in known skeletal mets. CT C/A/P with contrast with stable osseous mets, no yusuf enlargement, no new visceral mets etc.  - 04/08/20: Zometa every 3 months.  - 10/5/20: CT C/A/P with contrast and NM bone scan - SD.   - 1/4/21: CT C/A/P with contrast and NM bone scan - SD but slight increase in right 5th rib tracer uptake and in posterior L5 sclerosis.   - 03/29/21: CT C/A/P with contrast - single new right sacral 8mm bone lesion (suspicious), other bone mets stable. No visceral/yusuf disease. Nephrectomy site without recurrence. NM bone scan - SD but \"increased uptake associated with the prior lesions of the lower  cervical spine, posterior right fourth rib and right L5 posterior arch elements. Otherwise unchanged uptake associated with the proximal left femur and left anterior fourth rib. Similar uptake of the left halux, possibly secondary to degenerative osteoarthritis or " "gout.\"  -  START XTANDI  -  RT to L5 / S1    He continues to have some low back pain and numbness in the left leg.  He thinks this may be related to the radiation.  He had a chiropractor doing adjustments which seemed to help.  He has no urinary incontinence.  He has no significant numbness in the legs.    He has a cruise planned to [place] in June.      2. Stage III ([id number]), grade 3 of 4, clear-cell RCC of right kidney:  - Incidentally diagnosed as above.   - Underwent curative-intent robotic right radical nephrectomy with Dr. Wesley Cleveland on 3/19/19. No tumor spillage per op report.   - Path showed: \"Histologic Type: Clear cell renal cell carcinoma; Sarcomatoid Features: Not identified; Histologic Grade: Nucleolar grade 3 (WHO/ISUP). Extent Tumor Size: 4.3 cm. Microscopic Tumor Extension: Tumor extension into renal sinus (in vascular structures). Margins: Negative. Tumor Necrosis: Present; focal. Lymph-Vascular Invasion: Not identified.  Pathologic Staging (pTNM) Primary Tumor (pT): pT3a: Tumor extends into renal vein branches/renal sinus. Regional Lymph Nodes (pN): pNX. Number of Lymph Nodes Examined: 0 Distant Metastasis (pM): pM N/A.\"  - Restaging scans as above.    PAST MEDICAL HISTORY:  Past Medical History:   Diagnosis Date     Cancer of kidney, right (H)      Complication of anesthesia     slow wakeup      Malignant neoplasm of prostate metastatic to bone (H) 2/14/2019     Thyroid disease      PAST SURGICAL HISTORY:   Past Surgical History:   Procedure Laterality Date     CYSTOSCOPY      about 10 years ago     INSERT PORT VASCULAR ACCESS Right 5/2/2019    Procedure: Chest Port Placement;  Surgeon: Tate Burciaga PA-C;  Location: [facility] OR     IR CHEST PORT PLACEMENT > 5 YRS OF AGE  5/2/2019     LAPAROSCOPIC NEPHRECTOMY Right 3/19/2019    Procedure: Right laparoscopic radical nephrectomy;  Surgeon: Wesley Cleveland MD;  Location:  OR      SOCIAL HISTORY:   Social History "     Tobacco Use     Smoking status: Never Smoker     Smokeless tobacco: Never Used   Substance Use Topics     Drug use: No     FAMILY HISTORY:   Family History   Problem Relation Age of Onset     Diabetes Father      ALLERGIES:   Allergies   Allergen Reactions     Doxycycline GI Disturbance     CURRENT MEDICATIONS:   Current Outpatient Medications:      amLODIPine (NORVASC) 10 MG tablet, Take 1 tablet (10 mg) by mouth daily, Disp: 30 tablet, Rfl: 0     atorvastatin (LIPITOR) 20 MG tablet, Take 60 mg by mouth daily , Disp: , Rfl:      calcium citrate-vitamin D (CITRACAL) 315-250 MG-UNIT TABS per tablet, Take 650 mg by mouth 2 times daily , Disp: , Rfl:      cetirizine (ZYRTEC) 10 MG tablet, Take 10 mg by mouth as needed for allergies  (Patient not taking: Reported on 5/5/2022), Disp: , Rfl:      cycloSPORINE (RESTASIS) 0.05 % ophthalmic emulsion, Place 1 drop into both eyes 2 times daily , Disp: , Rfl:      doxycycline hyclate (VIBRA-TABS) 100 MG tablet, Take 100 mg by mouth daily Daily every other month, Disp: , Rfl:      enzalutamide (XTANDI) 40 MG capsule, Take 4 capsules (160 mg) by mouth daily, Disp: 120 capsule, Rfl: 0     fluticasone (FLONASE) 50 MCG/ACT nasal spray, Spray 2 sprays in nostril daily as needed  (Patient not taking: Reported on 5/5/2022), Disp: , Rfl:      Glucosamine-Chondroit-Vit C-Mn (GLUCOSAMINE 1500 COMPLEX) CAPS, Take 1 capsule by mouth daily, Disp: , Rfl:      levothyroxine (SYNTHROID/LEVOTHROID) 112 MCG tablet, Take 112 mcg by mouth daily, Disp: , Rfl:      loratadine (CLARITIN) 10 MG tablet, , Disp: , Rfl:      MAGNESIUM PO, Take 250 mg by mouth 2 times daily , Disp: , Rfl:      MEBENDAZOLE PO, Take 225 mg by mouth daily , Disp: , Rfl:      metFORMIN (GLUCOPHAGE) 500 MG tablet, Take 1,500 mg by mouth 2 times daily (with meals) 500 mg 1000 at PM, Disp: , Rfl:      methocarbamol (ROBAXIN) 500 MG tablet, Take 1 tablet (500 mg) by mouth 4 times daily as needed for muscle spasms (Patient not  taking: Reported on 2022), Disp: 30 tablet, Rfl: 1     methylPREDNISolone (MEDROL DOSEPAK) 4 MG tablet therapy pack, Follow Package Directions, Disp: 21 tablet, Rfl: 0     Multiple Vitamins-Minerals (MULTIVITAMIN ADULT EXTRA C PO), Take 1 tablet by mouth every 24 hours, Disp: , Rfl:      Nutritional Supplements (SALMON OIL) CAPS, Take 2 capsules by mouth daily , Disp: , Rfl:      omeprazole (PRILOSEC) 20 MG DR capsule, Take 20 mg by mouth daily, Disp: , Rfl:      prochlorperazine (COMPAZINE) 10 MG tablet, Take 1 tablet (10 mg) by mouth every 6 hours as needed for nausea or vomiting (Patient not taking: Reported on 2022), Disp: 30 tablet, Rfl: 3     tamsulosin (FLOMAX) 0.4 MG capsule, Take 1 capsule (0.4 mg) by mouth daily, Disp: 30 capsule, Rfl: 3     traMADol (ULTRAM) 50 MG tablet, Take 1 tablet (50 mg) by mouth every 6 hours as needed for severe pain, Disp: 30 tablet, Rfl: 1    PHYSICAL EXAMINATION:  VITALS: There were no vitals taken for this visit.     General: No acute distress  HEENT: Sclera anicteric. Oral mucosa pink and moist.  No mucositis or thrush  Heart: Regular, rate, and rhythm  Lungs: Clear to ascultation bilaterally  Extremities: no lower extremity edema  MSK/neuro: + straight leg raise bilaterally. Sensation intact to touch. 5/5 strength. Ambulating without difficulty. No focal tenderness to lower portion of lumbar spine on exam.  Rash: none    ECOG PS 1.    LABORATORY DATA/IMAGIN/1/22  CT  IMPRESSION:  1.  Stable distribution of bony metastatic disease showing increased  sclerosis involving some lesions. Correlate with bone scanning for  further assessment.  2.  No new disease otherwise seen.  3.  Stable tiny pulmonary nodule in the right.  4.  Stable hiatal hernia.      BS    IMPRESSION:   1. New focus of increased uptake left superior acetabulum and distal  left femoral diaphysis.  2. Slight increased uptake in the left first rib.   3. Stable increased uptake in the other  "metastatic foci described  above.     See top of note for PSA trend.     Most Recent 3 CBC's:  Recent Labs   Lab Test 05/05/22  1114 03/22/22  0739 02/11/22  1044   WBC 4.2 6.6 5.8   HGB 12.8* 13.0* 13.4   MCV 90 92 90    239 225     Most Recent 3 BMP's:  Recent Labs   Lab Test 05/05/22  1114 05/05/22  1049 03/22/22  0739 02/11/22  1044     --  140 142   POTASSIUM 4.2  --  4.7 4.0   CHLORIDE 106  --  109 109   CO2 26  --  21 23   BUN 10  --  14 14   CR 0.74 0.8 1.00 0.87   ANIONGAP 5  --  10 10   BETHANY 8.7  --  9.4 9.2   GLC 96  --  110* 97    Most Recent 2 LFT's:  Recent Labs   Lab Test 05/05/22  1114 03/22/22  0739   AST 15 18   ALT 20 21   ALKPHOS 76 82   BILITOTAL 0.4 0.4    Most Recent TSH and T4:  Recent Labs   Lab Test 07/07/21  0834   TSH 1.67     I reviewed the above labs today.      I personally reviewed labs and imaging today - including CT and bone scan. Two new lesions in the left hip are visible.      ASSESSMENT & PLAN: Mr. Reyes is a delightful 62 year old gentleman with recently diagnosed metastatic castration sensitive prostate adenocarcinoma as well as an incidentally diagnosed stage III clear-cell renal cell carcinoma of the right kidney (s/p radical R nephrectomy 3/19/19), who is here for a follow-up visit after completion of docetaxel for prostate cancer.     1. Metastatic prostate cancer, with involvement of the bones and RPLN:   - Patient met the criteria for CHAARTED \"high-volume\" metastatic hormone-sensitive prostate cancer. He opted for docetaxel in combination with ADT (per JOSEFA, [id number], STAMPEDE). Tolerated 6 cycles of docetaxel fairly well (5/8/19 through 8/21/19), with the exception of mild fatigue and mild neuropathy. Now on ADT and tolerating fairly well.  - Continue leuprolide 22.5mg every 3 months, last dosed in Feb  -PSA now rising, doubling time of ~2 mo. Will continue XTANDI, d/w Dr. Winters  - S/P radiation to  L5/S1  -Discontinue XTANDI  -Obtain blood " for CFDNA  -START Docetaxel ( After Cruise. Plan is to start on July 11) -we can reconsider this if his symptoms worsen.  -I do not feel his symptoms are due to nerve impingement from tumor because they do not fall that typical pattern and he also does not have significant disease in the upper spine.  It is possible that some of the lumbar nerves are being impinged in an area at L5-S1 however that area has been radiated.  This is part of the justification for moving forward with chemotherapy.    2. Bone metastases:   - Noted to have osseous metastatic disease at the time of diagnosis. Last bone scan stable ex likely bone flare of L rib  - Encouraged to continue calcium and vitamin D supplementation.  - Will continue Zometa 4mg IV every 3 months, due May     3. Stage 3, UISS-high risk ccRCC:  - pproximately 40-50% risk of recurrence after definitive surgery.   - no clear evidence of residual disease at this time; the retroperitoneal lymphadenopathy is more consistent with metastatic prostate cancer   - Continue active surveillance with self-reporting for signs/symptoms of recurrence (this was previously explained in detail) and periodic H&P and scans.     4. Diarrhea. Improving. Could check CDIFF however improving on its own.         Sincerely,    Jj Winters MD

## 2022-05-10 ENCOUNTER — DOCUMENTATION ONLY (OUTPATIENT)
Dept: ONCOLOGY | Facility: CLINIC | Age: 62
End: 2022-05-10
Payer: COMMERCIAL

## 2022-05-10 NOTE — PROGRESS NOTES
[facility] Oral Chemotherapy Monitoring Program    Thank you for the opportunity to be a part in the care of this patient's oral chemotherapy. The oncology pharmacy will no longer be following this patient for oral chemotherapy. If there are any questions or the plan changes, feel free to contact us.    ORAL CHEMOTHERAPY 2/9/2022 2/22/2022 2/24/2022 3/2/2022 3/23/2022 4/1/2022 5/10/2022   Assessment Type Refill Left Voicemail Incoming phone call;Initial Follow up;Other Refill Lab Monitoring Refill Discontinuation   Stop Date - - - - - - 5/9/2022   Reason for Discontinuation - - - - - - Disease progression   Diagnosis Code Prostate Cancer Prostate Cancer Prostate Cancer Prostate Cancer Prostate Cancer Prostate Cancer Prostate Cancer   Providers Dr. Singh Winters   [facility]   Drug Name Xtandi (enzalutamide) Xtandi (enzalutamide) Xtandi (enzalutamide) Xtandi (enzalutamide) Xtandi (enzalutamide) Xtandi (enzalutamide) Xtandi (enzalutamide)   Dose 160 mg 160 mg 160 mg 160 mg 160 mg 160 mg 160 mg   Current Schedule Daily Daily Daily Daily Daily Daily Daily   Cycle Details Continuous Continuous Continuous Continuous Continuous Continuous Continuous   Start Date of Last Cycle - - - - - - -   Planned next cycle start date - - - - - - -   Doses missed in last 2 weeks - - 0 - 1 - -   Adherence Assessment - - Adherent - Adherent - -   Adverse Effects - - Other (See Note for Details) - Other (See Note for Details) - -   Fatigue - - - - - - -   Pharmacist Intervention(fatigue) - - - - - - -   Other (See Note for Details) - - - - no - -   Pharmacist intervention(other) - - Yes - - - -   Intervention(s) - - Patient education - - - -   Home BPs - - - - - - -   Any new drug interactions? - - No - No - -   Pharmacist Intervention? - - - - - - -   Intervention(s) - - - - - - -   Is the  dose as ordered appropriate for the patient? - - - - - - -   Is the patient currently in pain? - - - - Yes - -   Has the patient missed any days of school, work, or other routine activity? - - - - - - -   Since the last time we talked, have you been hospitalized or used the emergency room? - - - - - - -       Cinthya Dior, PharmD  Hematology/Oncology Clinical Pharmacist  Oral Chemotherapy Monitoring Program  [facility]  [phone]  May 10, 2022

## 2022-05-16 LAB — SCANNED LAB RESULT: NORMAL

## 2022-05-19 ENCOUNTER — TELEPHONE (OUTPATIENT)
Dept: ONCOLOGY | Facility: CLINIC | Age: 62
End: 2022-05-19
Payer: COMMERCIAL

## 2022-05-19 ENCOUNTER — MYC MEDICAL ADVICE (OUTPATIENT)
Dept: ONCOLOGY | Facility: CLINIC | Age: 62
End: 2022-05-19
Payer: COMMERCIAL

## 2022-05-19 NOTE — TELEPHONE ENCOUNTER
LA paperwork received via fax from [employer]. Will be placed in provider folder for signature upon completion.     Fax: [phone]      Ok Uribe

## 2022-05-23 ENCOUNTER — MYC MEDICAL ADVICE (OUTPATIENT)
Dept: ONCOLOGY | Facility: CLINIC | Age: 62
End: 2022-05-23
Payer: COMMERCIAL

## 2022-05-23 NOTE — TELEPHONE ENCOUNTER
Signed form received and faxed to [employer], fax # [phone]. Successful transmission verified via rightfax. Copy of forms sent to scanning and emailed to the patient, per the patients' request.     Ok Uribe on 5/23/2022 at 2:55 PM

## 2022-06-30 PROBLEM — M54.42 ACUTE LOW BACK PAIN WITH LEFT-SIDED SCIATICA: Status: RESOLVED | Noted: 2022-04-04 | Resolved: 2022-06-30

## 2022-06-30 NOTE — PROGRESS NOTES
Discharge Note    Progress reporting period is from initial evaluation date (please see noted date below) to May 2, 2022.  Linked Episodes   Type: Episode: Status: Noted: Resolved: Last update: Updated by:   PHYSICAL THERAPY LBP 4/4/22 Active 4/4/2022 5/2/2022  7:45 AM Aaliyah Frank, PT      Comments:       Walter failed to follow up and current status is unknown.  Please see information below for last relevant information on current status.  Patient seen for 4 visits.    SUBJECTIVE  Subjective changes noted by patient:  Pt reports last week was rough, had a lot of nausea and just didn't feel well, wasn't able to do floor exercises, mostly standing exercises.Still taking tylenol every 6 hours. pain is better but still problematic on L side.  .  Current pain level is 4/10.     Previous pain level was  5/10 (pt reports his pain levels reported were too low in the past, updated today).   Changes in function:  Yes (See Goal flowsheet attached for changes in current functional level)  Adverse reaction to treatment or activity: None    OBJECTIVE  Changes noted in objective findings: review of exercises and nerve glides, nerve glides irritating but pt intructed to continue as tolerated. Began cat cow and 4pt core ex     ASSESSMENT/PLAN  Diagnosis: LBP and L hip pain    Updated problem list and treatment plan:   Pain - HEP  Decreased function - HEP  Decreased strength - HEP  STG/LTGs have been met or progress has been made towards goals:  Yes, please see goal flowsheet for most current information  Assessment of Progress: current status is unknown.    Last current status: Pt is progressing as expected   Self Management Plans:  HEP  I have re-evaluated this patient and find that the nature, scope, duration and intensity of the therapy is appropriate for the medical condition of the patient.  Walter continues to require the following intervention to meet STG and LTG's:  HEP.    Recommendations:  Discharge with current  home program.  Patient to follow up with MD as needed.    Please refer to the daily flowsheet for treatment today, total treatment time and time spent performing 1:1 timed codes.

## 2022-07-08 RX ORDER — PROCHLORPERAZINE MALEATE 10 MG
10 TABLET ORAL EVERY 6 HOURS PRN
Qty: 30 TABLET | Refills: 2 | Status: SHIPPED | OUTPATIENT
Start: 2022-07-10 | End: 2023-02-10

## 2022-07-08 RX ORDER — DEXAMETHASONE 4 MG/1
8 TABLET ORAL 2 TIMES DAILY WITH MEALS
Qty: 6 TABLET | Refills: 9 | Status: SHIPPED | OUTPATIENT
Start: 2022-07-10 | End: 2022-09-12

## 2022-07-08 NOTE — PROGRESS NOTES
"  [facility] Oncology Followup  Oncologist: Dr. Jj Winters  Jul 11, 2022           REASON FOR VISIT: Follow-up for metastatic castration-resistant prostate cancer, here for Cycle 1 Docetaxel    HISTORY OF PRESENT ILLNESS: Mr. 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 His oncologic history is detailed below.     1/6/21  PSA = 0.29  3/31/21  PSA = 0.44  7/7/21  PSA = 0.47  11/2021  PSA = 1.05  -- Start XTANDI  12/16/21 PSA=0.22  2/11/22  PSA= 0.50  3/22/22  PSA=0.72  5/5/2022 PSA=1.79  7/11/2022 PSA=2.16 --Start cycle 1 docetaxel     Interval History:.     ONCOLOGIC HISTORY:  1. 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 Naylor at [facility]. Prior PSA were 1.4 on 8/16/17, 2.4 on 6/28/16, 2.9 on 6/28/16, and as low as 0.4 on 3/26/2003.   - 1/08/2019: Consultation with Sarah Wilson CNP in Urology clinic. Repeat PSA 9.6.  - 1/16/2019: MRI prostate with contrast - \"This examination is characterized as PIRADS 5- very high probability. Clinically significant cancer is highly likely to be present. There is a large, invasive mass arising from the right peripheral zone and extending into the neurovascular bundle, seminal vesicle, and along the anterolateral right mesorectal fascia. Metastatic right external iliac lymph node. Metastatic lesion in the posterior/superior right acetabulum.\"  - 1/25/2019: CT abdomen and pelvis with IV contrast - \"1. Heterogeneous enhancing mass posteriorly in the upper pole of the right kidney measures 4.5 x 5.8 x 5.7 cm (AP by transverse by craniocaudal). It has a small nodular component extending posterior medially which abuts the right psoas muscle. This " "nodular extension measures 2.1 x 2.1 cm. Minimal stranding about the mass. No definite thrombus within the right renal vein. This renal mass is compatible with a renal cell carcinoma. A paraaortic lymph node situated immediately posterior to the left renal vein measures 1.1 cm in short axis, suspicious for metastasis. 2. A 2 cm right external iliac lymph node is also suspicious for metastases. 3. Multiple sclerotic osseous lesions suspicious for metastases. These include 0.8 and 0.6 cm sclerotic lesions laterally in the right iliac wing (images 53 and 62 respectively). Ill-defined groundglass density laterally in the right acetabulum measuring 1.7 x 1.2 cm corresponds with the lesion identified on MRI. A 0.4 cm groundglass density in the left acetabulum. Sclerotic lesion in the left femoral neck measures 0.9 x 1.6 cm (image 81). Sclerotic metastases would be more compatible with prostate metastases. 4. A 3 subcentimeter hepatic lesions are indeterminate. Metastases would be a consideration. These can be further characterized with liver MRI.\"  - 1/25/2019: NM bone scan - \"There is focal bony uptake in the left femoral neck, right acetabulum, right of midline at the S1 or L5 level of the spine, within multiple bilateral ribs, in the C7 or T1 level of the spine, and anteriorly within the skull. Findings are suspicious for metastases.\"  - 1/31/19: CT chest with contrast - \" No suspicious nodules in the chest.  Stable appearance of a 5.8 cm right renal mass concerning for renal carcinoma until proven otherwise.  Stable indeterminant subcentimeter hypodensities in the liver.  Multifocal osteoblastic metastasis including 2.5 cm lesion in the right fifth rib posteriorly and a 1.6 cm lesion in the right third rib posteriorly. No lytic lesions identified.\"  - 2/6/19: CT-guided right sclerotic fifth rib lesion biopsy - \"Metastatic carcinoma, consistent with prostate primary.  Immunohistochemical stains performed show the " "metastatic carcinoma stains positive for NKX3.1 (prostate marker), negative for STACIE 3 and PAX8, which supports the above diagnosis.\"  - 2/15/19: Started Casodex 50mg every day and consented for the biobanking protocol. 3/18/19 - stopped Casodex.  - 2/19/19: Case discussed in tumor board - recommendation for nephectomy for suspected malignant right renal mass.   - 2/26/19: Started Lupron 22.5mg every 3 months.   - 5/8/19: Started Docetaxel 75mg/m2 IV every 3 weeks. 06/18/19 - cycle 3, 7/10/19 - cycle 4, 07/31/19 - cycle 5, 08/21/19 - cycle 6.   - 10/2/19: Restaging CT CAP and NM Bone Scan showed improved osseous disease, no evidence of recurrent or new metastatic disease.  - 1/5/20: Restaging CT CAP and NM Bone Scan showed stable osseous disease, no evidence of recurrent or new metastatic disease.  - 4/6/20: NM bone scan with slightly improved uptake in known skeletal mets. CT C/A/P with contrast with stable osseous mets, no yusuf enlargement, no new visceral mets etc.  - 04/08/20: Zometa every 3 months.  - 10/5/20: CT C/A/P with contrast and NM bone scan - SD.   - 1/4/21: CT C/A/P with contrast and NM bone scan - SD but slight increase in right 5th rib tracer uptake and in posterior L5 sclerosis.   - 03/29/21: CT C/A/P with contrast - single new right sacral 8mm bone lesion (suspicious), other bone mets stable. No visceral/yusuf disease. Nephrectomy site without recurrence. NM bone scan - SD but \"increased uptake associated with the prior lesions of the lower  cervical spine, posterior right fourth rib and right L5 posterior arch elements. Otherwise unchanged uptake associated with the proximal left femur and left anterior fourth rib. Similar uptake of the left halux, possibly secondary to degenerative osteoarthritis or gout.\"  -  START XTANDI  -  RT to L5 / S1  -  STOPPED XTANDI   -07/11/2022  START Cycle 1 Docetaxel     INTERVAL HISTORY:   Walter presents for oncology follow-up visit today " "and consideration of Cycle 1 Docetaxel. He is here with his wife. He is doing ok. He went on a cruise for vacation in [place] last month and enjoyed it very much. He offers no specific physical complaints today. He reports low back pain and numbness in his left leg has resolved. He denies neuropathy today. He remembered previously tolerating docetaxel overall well in 2019. He did have a mild reaction (numbness in b/l UE and \"red splotchy skin\" per patient report) with cycle 1 and required IV benadryl with subsequent cycles as pre-medication. He reports he does not have diabetes. He says he was put on metformin \"as adjunct to cancer treatment.\" His appetite is good. He denies new pain. Normal bowel function. No nausea/vomiting. His breathing is good. No CP. No fevers or chills. ROS is otherwise negative.     2. Stage III ([id number]), grade 3 of 4, clear-cell RCC of right kidney:  - Incidentally diagnosed as above.   - Underwent curative-intent robotic right radical nephrectomy with Dr. Wesley Cleveland on 3/19/19. No tumor spillage per op report.   - Path showed: \"Histologic Type: Clear cell renal cell carcinoma; Sarcomatoid Features: Not identified; Histologic Grade: Nucleolar grade 3 (WHO/ISUP). Extent Tumor Size: 4.3 cm. Microscopic Tumor Extension: Tumor extension into renal sinus (in vascular structures). Margins: Negative. Tumor Necrosis: Present; focal. Lymph-Vascular Invasion: Not identified.  Pathologic Staging (pTNM) Primary Tumor (pT): pT3a: Tumor extends into renal vein branches/renal sinus. Regional Lymph Nodes (pN): pNX. Number of Lymph Nodes Examined: 0 Distant Metastasis (pM): pM N/A.\"  - Restaging scans as above.    PAST MEDICAL HISTORY:  Past Medical History:   Diagnosis Date     Cancer of kidney, right (H)      Complication of anesthesia     slow wakeup      Malignant neoplasm of prostate metastatic to bone (H) 2/14/2019     Thyroid disease      PAST SURGICAL HISTORY:   Past Surgical History: "   Procedure Laterality Date     CYSTOSCOPY      about 10 years ago     INSERT PORT VASCULAR ACCESS Right 5/2/2019    Procedure: Chest Port Placement;  Surgeon: Tate Burciaga PA-C;  Location: [facility] OR     IR CHEST PORT PLACEMENT > 5 YRS OF AGE  5/2/2019     LAPAROSCOPIC NEPHRECTOMY Right 3/19/2019    Procedure: Right laparoscopic radical nephrectomy;  Surgeon: Wesley Cleveland MD;  Location: [facility] OR      SOCIAL HISTORY:   Social History     Tobacco Use     Smoking status: Never Smoker     Smokeless tobacco: Never Used   Substance Use Topics     Drug use: No     FAMILY HISTORY:   Family History   Problem Relation Age of Onset     Diabetes Father      ALLERGIES:   Allergies   Allergen Reactions     Doxycycline GI Disturbance     CURRENT MEDICATIONS:   Current Outpatient Medications:      amLODIPine (NORVASC) 10 MG tablet, Take 1 tablet (10 mg) by mouth daily, Disp: 30 tablet, Rfl: 0     atorvastatin (LIPITOR) 20 MG tablet, Take 60 mg by mouth daily , Disp: , Rfl:      calcium citrate-vitamin D (CITRACAL) 315-250 MG-UNIT TABS per tablet, Take 650 mg by mouth 2 times daily , Disp: , Rfl:      cetirizine (ZYRTEC) 10 MG tablet, Take 10 mg by mouth as needed for allergies, Disp: , Rfl:      cycloSPORINE (RESTASIS) 0.05 % ophthalmic emulsion, Place 1 drop into both eyes 2 times daily , Disp: , Rfl:      dexamethasone (DECADRON) 4 MG tablet, Take 2 tablets (8 mg) by mouth 2 times daily (with meals) for 3 doses Start evening of Docetaxel infusion and continue for a total of 3 doses., Disp: 6 tablet, Rfl: 9     doxycycline hyclate (VIBRA-TABS) 100 MG tablet, Take 100 mg by mouth daily Daily every other month, Disp: , Rfl:      fluticasone (FLONASE) 50 MCG/ACT nasal spray, Spray 2 sprays in nostril daily as needed, Disp: , Rfl:      Glucosamine-Chondroit-Vit C-Mn (GLUCOSAMINE 1500 COMPLEX) CAPS, Take 1 capsule by mouth daily, Disp: , Rfl:      levothyroxine (SYNTHROID/LEVOTHROID) 112 MCG tablet, Take 112 mcg by  mouth daily, Disp: , Rfl:      loratadine (CLARITIN) 10 MG tablet, , Disp: , Rfl:      MAGNESIUM PO, Take 250 mg by mouth 2 times daily , Disp: , Rfl:      MEBENDAZOLE PO, Take 225 mg by mouth daily , Disp: , Rfl:      metFORMIN (GLUCOPHAGE) 500 MG tablet, Take 1,500 mg by mouth 2 times daily (with meals) 500 mg 1000 at PM, Disp: , Rfl:      methocarbamol (ROBAXIN) 500 MG tablet, Take 1 tablet (500 mg) by mouth 4 times daily as needed for muscle spasms, Disp: 30 tablet, Rfl: 1     methylPREDNISolone (MEDROL DOSEPAK) 4 MG tablet therapy pack, Follow Package Directions, Disp: 21 tablet, Rfl: 0     Multiple Vitamins-Minerals (MULTIVITAMIN ADULT EXTRA C PO), Take 1 tablet by mouth every 24 hours, Disp: , Rfl:      Nutritional Supplements (SALMON OIL) CAPS, Take 2 capsules by mouth daily , Disp: , Rfl:      omeprazole (PRILOSEC) 20 MG DR capsule, Take 20 mg by mouth daily, Disp: , Rfl:      prochlorperazine (COMPAZINE) 10 MG tablet, Take 1 tablet (10 mg) by mouth every 6 hours as needed for nausea or vomiting, Disp: 30 tablet, Rfl: 2     tamsulosin (FLOMAX) 0.4 MG capsule, Take 1 capsule (0.4 mg) by mouth daily, Disp: 30 capsule, Rfl: 3     traMADol (ULTRAM) 50 MG tablet, Take 1 tablet (50 mg) by mouth every 6 hours as needed for severe pain, Disp: 30 tablet, Rfl: 1     predniSONE (DELTASONE) 5 MG tablet, Take 1 tablet (5 mg) by mouth 2 times daily for 21 days, Disp: 42 tablet, Rfl: 0     prochlorperazine (COMPAZINE) 10 MG tablet, Take 1 tablet (10 mg) by mouth every 6 hours as needed for nausea or vomiting (Patient not taking: No sig reported), Disp: 30 tablet, Rfl: 3  No current facility-administered medications for this visit.    Facility-Administered Medications Ordered in Other Visits:      0.9% sodium chloride BOLUS, 250 mL, Intravenous, Once, Jj Winters MD     diphenhydrAMINE (BENADRYL) 50 mg in sodium chloride 0.9 % intermittent infusion, 50 mg, Intravenous, Once, Taylor Mcneil PA-C     DOCEtaxel  (TAXOTERE) 172 mg in sodium chloride in non-PVC container 0.9 % 284 mL infusion, 75 mg/m2 (Treatment Plan Recorded), Intravenous, Once, Jj Winters MD     heparin 100 UNIT/ML injection 5 mL, 5 mL, Intracatheter, Once PRN, Jj Winters MD     ondansetron (ZOFRAN) 8 mg, dexamethasone (DECADRON) 12 mg in sodium chloride 0.9 % 55.2 mL intermittent infusion, , Intravenous, Once, Jj Winters MD     sodium chloride (PF) 0.9% PF flush 3-20 mL, 3-20 mL, Intracatheter, Q1H PRN, Jj Winters MD    Physical Exam:   /80   Pulse 61   Temp 98.2  F (36.8  C) (Oral)   Resp 18   Wt 105.6 kg (232 lb 11.2 oz)   SpO2 97%   BMI 32.20 kg/m     General: well appearing, no acute distress, pleasant male   HEENT: normocephalic, atraumatic, PERRLA, sclerae nonicteric  CV: regular rate and rhythm, no murmurs  Lungs: clear to auscultation bilaterally, no wheezes/rales/rhonchi  Abd: soft, positive bowel sounds, non-distended, non-tender  MSK: full range of motion in all four extremities, no peripheral edema  Neuro: alert and oriented x3, CN grossly intact   Psych: appropriate mood and affect  Skin: no rashes or lesions    ECOG PS 1.    LABORATORY DATA: Reviewed labs today. BMP pending.    Latest Reference Range & Units 07/11/22 09:22   Albumin 3.4 - 5.0 g/dL 3.8   Protein Total 6.8 - 8.8 g/dL 7.3   Alkaline Phosphatase 40 - 150 U/L 78   ALT 0 - 70 U/L 22   AST 0 - 45 U/L 20   Bilirubin Direct 0.0 - 0.2 mg/dL 0.1   Bilirubin Total 0.2 - 1.3 mg/dL 0.6   PSA 0.00 - 4.00 ug/L 2.16   WBC 4.0 - 11.0 10e3/uL 4.2   Hemoglobin 13.3 - 17.7 g/dL 13.2 (L)   Hematocrit 40.0 - 53.0 % 39.4 (L)   Platelet Count 150 - 450 10e3/uL 198   RBC Count 4.40 - 5.90 10e6/uL 4.36 (L)   MCV 78 - 100 fL 90   MCH 26.5 - 33.0 pg 30.3   MCHC 31.5 - 36.5 g/dL 33.5   RDW 10.0 - 15.0 % 12.2   % Neutrophils % 59   % Lymphocytes % 29   % Monocytes % 8   % Eosinophils % 3   % Basophils % 1   Absolute Basophils 0.0 - 0.2 10e3/uL 0.0   Absolute Eosinophils  "0.0 - 0.7 10e3/uL 0.1   Absolute Immature Granulocytes <=0.4 10e3/uL 0.0   Absolute Lymphocytes 0.8 - 5.3 10e3/uL 1.2   Absolute Monocytes 0.0 - 1.3 10e3/uL 0.3   % Immature Granulocytes % 0   Absolute Neutrophils 1.6 - 8.3 10e3/uL 2.5   Absolute NRBCs 10e3/uL 0.0   NRBCs per 100 WBC <1 /100 0       ASSESSMENT & PLAN: Mr. Reyes is a delightful 62 year old gentleman with metastatic castration sensitive prostate adenocarcinoma as well as an incidentally diagnosed stage III clear-cell renal cell carcinoma of the right kidney (s/p radical R nephrectomy 3/19/19), who is here for a follow-up visit and initiation of docetaxel for now metastatic castration-resistant prostate cancer.     1. Metastatic castration-resistant prostate cancer, with involvement of the bones and RPLN:   - Patient met the criteria for CHAARTED \"high-volume\" metastatic hormone-sensitive prostate cancer. He opted for docetaxel in combination with ADT (per JOSEFA, [id number], KARLA). Tolerated 6 cycles of docetaxel fairly well (5/8/19 through 8/21/19), with the exception of mild fatigue and mild neuropathy.   - Continue leuprolide 22.5mg every 3 months, last dosed in May  - S/P radiation to  L5/S1 (4/2022) with resolution of low back pain and left leg numbness.   - Obtain blood for CFDNA  - Rising PSA from 0.72 (3/22/22)-->1.79 (5/5/22). He had CT scan and NM Bone Scan done 5/5 which confirmed disease progression. Dr. Winters recommended stopping Xtandi (which he did 5/202) and starting Docetaxel for metastatic castration resistant prostate cancer. He recently went to a cruise in [place] last month. He is here to start Cycle 1 of Docetaxel today. He states he remembered tolerating it overall well back in 2019. He did have a mild reaction with Cycle 1 so IV benadryl was added as pre-medication to subsequent cycles. Reviewed with pharmacy and will plan to add IV benadryl to pre-medication today and subsequent cycles. Will have infusion nurse " review Dex and Prednisone dosing plan with patient. His PSA level today is 2.16 which will serve as his baseline. Discussed that it may take multiple chemotherapy cycles before we see the PSA go down. We will have provider visits prior to each cycle to assess tolerance. Will likely plan for 4-6 cycles of chemotherapy depending on tolerance to treatment, but will confirm with Dr. Winters.     2. Bone metastases:   - Noted to have osseous metastatic disease at the time of diagnosis. Last bone scan (5/5/22) showed new focus of increased uptake left superior acetabulum and distal left femoral diaphysis; slight increased uptake in the left first rib; stable increased uptake in the other metastatic foci.   - Encouraged to continue calcium and vitamin D supplementation.  - Will continue Zometa 4mg IV every 3 months, last dosed on 5/5. Following dose due 7/28, can administer when he RTC for Cycle 2 Docetaxel in 3 weeks      3. Stage 3, UISS-high risk ccRCC: s/p R nephrectomy   - approximately 40-50% risk of recurrence after definitive surgery.   - no clear evidence of residual disease at this time; the retroperitoneal lymphadenopathy is more consistent with metastatic prostate cancer   - Continue active surveillance with self-reporting for signs/symptoms of recurrence (this was previously explained in detail) and periodic H&P and scans.       50 minutes spent on the date of the encounter doing chart review, review of test results, interpretation of tests, patient visit and documentation     Taylor Mcneil PA-C  [facility]  [address] [place] [address] [id number] [phone]

## 2022-07-10 DIAGNOSIS — C61 MALIGNANT NEOPLASM OF PROSTATE METASTATIC TO BONE (H): Primary | ICD-10-CM

## 2022-07-10 DIAGNOSIS — C79.51 MALIGNANT NEOPLASM OF PROSTATE METASTATIC TO BONE (H): Primary | ICD-10-CM

## 2022-07-10 RX ORDER — MEPERIDINE HYDROCHLORIDE 25 MG/ML
25 INJECTION INTRAMUSCULAR; INTRAVENOUS; SUBCUTANEOUS EVERY 30 MIN PRN
Status: CANCELLED | OUTPATIENT
Start: 2022-07-11

## 2022-07-10 RX ORDER — LORAZEPAM 2 MG/ML
0.5 INJECTION INTRAMUSCULAR EVERY 4 HOURS PRN
Status: CANCELLED | OUTPATIENT
Start: 2022-07-11

## 2022-07-10 RX ORDER — NALOXONE HYDROCHLORIDE 0.4 MG/ML
0.2 INJECTION, SOLUTION INTRAMUSCULAR; INTRAVENOUS; SUBCUTANEOUS
Status: CANCELLED | OUTPATIENT
Start: 2022-07-11

## 2022-07-10 RX ORDER — EPINEPHRINE 1 MG/ML
0.3 INJECTION, SOLUTION, CONCENTRATE INTRAVENOUS EVERY 5 MIN PRN
Status: CANCELLED | OUTPATIENT
Start: 2022-07-11

## 2022-07-10 RX ORDER — ALBUTEROL SULFATE 90 UG/1
1-2 AEROSOL, METERED RESPIRATORY (INHALATION)
Status: CANCELLED
Start: 2022-07-11

## 2022-07-10 RX ORDER — HEPARIN SODIUM,PORCINE 10 UNIT/ML
5 VIAL (ML) INTRAVENOUS
Status: CANCELLED | OUTPATIENT
Start: 2022-07-11

## 2022-07-10 RX ORDER — ALBUTEROL SULFATE 0.83 MG/ML
2.5 SOLUTION RESPIRATORY (INHALATION)
Status: CANCELLED | OUTPATIENT
Start: 2022-07-11

## 2022-07-10 RX ORDER — HEPARIN SODIUM (PORCINE) LOCK FLUSH IV SOLN 100 UNIT/ML 100 UNIT/ML
5 SOLUTION INTRAVENOUS
Status: CANCELLED | OUTPATIENT
Start: 2022-07-11

## 2022-07-10 RX ORDER — DIPHENHYDRAMINE HYDROCHLORIDE 50 MG/ML
50 INJECTION INTRAMUSCULAR; INTRAVENOUS
Status: CANCELLED
Start: 2022-07-11

## 2022-07-11 ENCOUNTER — APPOINTMENT (OUTPATIENT)
Dept: LAB | Facility: CLINIC | Age: 62
End: 2022-07-11
Attending: NURSE PRACTITIONER
Payer: COMMERCIAL

## 2022-07-11 ENCOUNTER — INFUSION THERAPY VISIT (OUTPATIENT)
Dept: ONCOLOGY | Facility: CLINIC | Age: 62
End: 2022-07-11
Attending: NURSE PRACTITIONER
Payer: COMMERCIAL

## 2022-07-11 VITALS
DIASTOLIC BLOOD PRESSURE: 80 MMHG | BODY MASS INDEX: 32.2 KG/M2 | HEART RATE: 61 BPM | SYSTOLIC BLOOD PRESSURE: 118 MMHG | WEIGHT: 232.7 LBS | RESPIRATION RATE: 18 BRPM | TEMPERATURE: 98.2 F | OXYGEN SATURATION: 97 %

## 2022-07-11 DIAGNOSIS — C61 MALIGNANT NEOPLASM OF PROSTATE METASTATIC TO BONE (H): Primary | ICD-10-CM

## 2022-07-11 DIAGNOSIS — C79.51 MALIGNANT NEOPLASM OF PROSTATE METASTATIC TO BONE (H): Primary | ICD-10-CM

## 2022-07-11 LAB
ALBUMIN SERPL-MCNC: 3.8 G/DL (ref 3.4–5)
ALP SERPL-CCNC: 78 U/L (ref 40–150)
ALT SERPL W P-5'-P-CCNC: 22 U/L (ref 0–70)
ANION GAP SERPL CALCULATED.3IONS-SCNC: 8 MMOL/L (ref 3–14)
AST SERPL W P-5'-P-CCNC: 20 U/L (ref 0–45)
BASOPHILS # BLD AUTO: 0 10E3/UL (ref 0–0.2)
BASOPHILS NFR BLD AUTO: 1 %
BILIRUB DIRECT SERPL-MCNC: 0.1 MG/DL (ref 0–0.2)
BILIRUB SERPL-MCNC: 0.6 MG/DL (ref 0.2–1.3)
BUN SERPL-MCNC: 12 MG/DL (ref 7–30)
CALCIUM SERPL-MCNC: 8.6 MG/DL (ref 8.5–10.1)
CHLORIDE BLD-SCNC: 108 MMOL/L (ref 94–109)
CO2 SERPL-SCNC: 24 MMOL/L (ref 20–32)
CREAT SERPL-MCNC: 0.88 MG/DL (ref 0.66–1.25)
EOSINOPHIL # BLD AUTO: 0.1 10E3/UL (ref 0–0.7)
EOSINOPHIL NFR BLD AUTO: 3 %
ERYTHROCYTE [DISTWIDTH] IN BLOOD BY AUTOMATED COUNT: 12.2 % (ref 10–15)
GFR SERPL CREATININE-BSD FRML MDRD: >90 ML/MIN/1.73M2
GLUCOSE BLD-MCNC: 103 MG/DL (ref 70–99)
HCT VFR BLD AUTO: 39.4 % (ref 40–53)
HGB BLD-MCNC: 13.2 G/DL (ref 13.3–17.7)
IMM GRANULOCYTES # BLD: 0 10E3/UL
IMM GRANULOCYTES NFR BLD: 0 %
LYMPHOCYTES # BLD AUTO: 1.2 10E3/UL (ref 0.8–5.3)
LYMPHOCYTES NFR BLD AUTO: 29 %
MCH RBC QN AUTO: 30.3 PG (ref 26.5–33)
MCHC RBC AUTO-ENTMCNC: 33.5 G/DL (ref 31.5–36.5)
MCV RBC AUTO: 90 FL (ref 78–100)
MONOCYTES # BLD AUTO: 0.3 10E3/UL (ref 0–1.3)
MONOCYTES NFR BLD AUTO: 8 %
NEUTROPHILS # BLD AUTO: 2.5 10E3/UL (ref 1.6–8.3)
NEUTROPHILS NFR BLD AUTO: 59 %
NRBC # BLD AUTO: 0 10E3/UL
NRBC BLD AUTO-RTO: 0 /100
PLATELET # BLD AUTO: 198 10E3/UL (ref 150–450)
POTASSIUM BLD-SCNC: 4 MMOL/L (ref 3.4–5.3)
PROT SERPL-MCNC: 7.3 G/DL (ref 6.8–8.8)
PSA SERPL-MCNC: 2.16 UG/L (ref 0–4)
RBC # BLD AUTO: 4.36 10E6/UL (ref 4.4–5.9)
SODIUM SERPL-SCNC: 140 MMOL/L (ref 133–144)
WBC # BLD AUTO: 4.2 10E3/UL (ref 4–11)

## 2022-07-11 PROCEDURE — 258N000003 HC RX IP 258 OP 636: Performed by: PHYSICIAN ASSISTANT

## 2022-07-11 PROCEDURE — G0463 HOSPITAL OUTPT CLINIC VISIT: HCPCS

## 2022-07-11 PROCEDURE — 96367 TX/PROPH/DG ADDL SEQ IV INF: CPT

## 2022-07-11 PROCEDURE — 36591 DRAW BLOOD OFF VENOUS DEVICE: CPT | Performed by: PHYSICIAN ASSISTANT

## 2022-07-11 PROCEDURE — 258N000003 HC RX IP 258 OP 636: Performed by: INTERNAL MEDICINE

## 2022-07-11 PROCEDURE — 250N000011 HC RX IP 250 OP 636: Performed by: PHYSICIAN ASSISTANT

## 2022-07-11 PROCEDURE — 250N000011 HC RX IP 250 OP 636: Performed by: INTERNAL MEDICINE

## 2022-07-11 PROCEDURE — 99215 OFFICE O/P EST HI 40 MIN: CPT | Performed by: PHYSICIAN ASSISTANT

## 2022-07-11 PROCEDURE — 84153 ASSAY OF PSA TOTAL: CPT

## 2022-07-11 PROCEDURE — 85018 HEMOGLOBIN: CPT | Performed by: PHYSICIAN ASSISTANT

## 2022-07-11 PROCEDURE — 96375 TX/PRO/DX INJ NEW DRUG ADDON: CPT

## 2022-07-11 PROCEDURE — 96413 CHEMO IV INFUSION 1 HR: CPT

## 2022-07-11 PROCEDURE — 82248 BILIRUBIN DIRECT: CPT | Performed by: PHYSICIAN ASSISTANT

## 2022-07-11 RX ORDER — HEPARIN SODIUM (PORCINE) LOCK FLUSH IV SOLN 100 UNIT/ML 100 UNIT/ML
5 SOLUTION INTRAVENOUS ONCE
Status: COMPLETED | OUTPATIENT
Start: 2022-07-11 | End: 2022-07-11

## 2022-07-11 RX ORDER — HEPARIN SODIUM (PORCINE) LOCK FLUSH IV SOLN 100 UNIT/ML 100 UNIT/ML
5 SOLUTION INTRAVENOUS
Status: DISCONTINUED | OUTPATIENT
Start: 2022-07-11 | End: 2022-07-11 | Stop reason: HOSPADM

## 2022-07-11 RX ORDER — PREDNISONE 5 MG/1
5 TABLET ORAL 2 TIMES DAILY
Qty: 42 TABLET | Refills: 0 | Status: SHIPPED | OUTPATIENT
Start: 2022-07-11 | End: 2022-08-01

## 2022-07-11 RX ADMIN — Medication 5 ML: at 13:19

## 2022-07-11 RX ADMIN — DIPHENHYDRAMINE HYDROCHLORIDE 50 MG: 50 INJECTION, SOLUTION INTRAMUSCULAR; INTRAVENOUS at 11:21

## 2022-07-11 RX ADMIN — DOCETAXEL 172 MG: 20 INJECTION, SOLUTION, CONCENTRATE INTRAVENOUS at 12:16

## 2022-07-11 RX ADMIN — Medication 5 ML: at 09:22

## 2022-07-11 RX ADMIN — DEXAMETHASONE SODIUM PHOSPHATE: 10 INJECTION, SOLUTION INTRAMUSCULAR; INTRAVENOUS at 11:54

## 2022-07-11 RX ADMIN — SODIUM CHLORIDE 250 ML: 9 INJECTION, SOLUTION INTRAVENOUS at 11:21

## 2022-07-11 ASSESSMENT — PAIN SCALES - GENERAL: PAINLEVEL: NO PAIN (0)

## 2022-07-11 NOTE — NURSING NOTE
"Oncology Rooming Note    July 11, 2022 9:47 AM   Walter Reyes is a 62 year old male who presents for:    Chief Complaint   Patient presents with     Blood Draw     Port blood draw with heparin flush by lab RN. Vitals taken and appointment arrived     Oncology Clinic Visit     Prostate Ca     Initial Vitals: /80   Pulse 61   Temp 98.2  F (36.8  C) (Oral)   Resp 18   Wt 105.6 kg (232 lb 11.2 oz)   SpO2 97%   BMI 32.20 kg/m   Estimated body mass index is 32.2 kg/m  as calculated from the following:    Height as of 5/9/22: 1.811 m (5' 11.28\").    Weight as of this encounter: 105.6 kg (232 lb 11.2 oz). Body surface area is 2.3 meters squared.  No Pain (0) Comment: Data Unavailable   No LMP for male patient.  Allergies reviewed: Yes  Medications reviewed: Yes    Medications: Medication refills not needed today.  Pharmacy name entered into EPIC:    [facility] - [place], [place] - [address] [id number] - [place], [place] - [address] [facility] - [place], [place] - [address] [facility] [place], [place] - [id number]    Clinical concerns:       Juany Saxena CMA              "

## 2022-07-11 NOTE — PATIENT INSTRUCTIONS
Remember to  your new prescriptions at [facility] today.  Compazine - this is an as-needed medication for nausea. Take this at the first sign of queasiness/ upset stomach.    Take 2 tablets (8mg total) of Dexamethasone tonight (7/11), tomorrow AM and tomorrow PM (7/12).  You will then start taking Prednisone Wednesday AM. You will take this twice daily (once in AM, once in PM) for 21 days. Do not take a dose of this the morning of your next chemo.    [facility] Triage and after hours / weekends / holidays:  [phone]    Please call the triage or after hours line if you experience a temperature greater than or equal to 100.4, shaking chills, have uncontrolled nausea, vomiting and/or diarrhea, dizziness, shortness of breath, chest pain, bleeding, unexplained bruising, or if you have any other new/concerning symptoms, questions or concerns.      If you are having any concerning symptoms or wish to speak to a provider before your next infusion visit, please call your care coordinator or triage to notify them so we can adequately serve you.     If you need a refill on a narcotic prescription or other medication, please call before your infusion appointment.

## 2022-07-11 NOTE — NURSING NOTE
Chief Complaint   Patient presents with     Blood Draw     Port blood draw with heparin flush by lab RN. Vitals taken and appointment arrived     Emilee Boateng RN

## 2022-07-11 NOTE — LETTER
"    7/11/2022         RE: Waletr Reyes  [address] [place] [address]        [facility] Oncology Followup  Oncologist: Dr. Jj Winters  Jul 11, 2022           REASON FOR VISIT: Follow-up for metastatic castration-resistant prostate cancer, here for Cycle 1 Docetaxel    HISTORY OF PRESENT ILLNESS: Mr. 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 His oncologic history is detailed below.     1/6/21  PSA = 0.29  3/31/21  PSA = 0.44  7/7/21  PSA = 0.47  11/2021  PSA = 1.05  -- Start XTANDI  12/16/21 PSA=0.22  2/11/22  PSA= 0.50  3/22/22  PSA=0.72  5/5/2022 PSA=1.79  7/11/2022 PSA=2.16 --Start cycle 1 docetaxel     Interval History:.     ONCOLOGIC HISTORY:  1. 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 Naylor at [facility]. Prior PSA were 1.4 on 8/16/17, 2.4 on 6/28/16, 2.9 on 6/28/16, and as low as 0.4 on 3/26/2003.   - 1/08/2019: Consultation with Sarah Wilson CNP in Urology clinic. Repeat PSA 9.6.  - 1/16/2019: MRI prostate with contrast - \"This examination is characterized as PIRADS 5- very high probability. Clinically significant cancer is highly likely to be present. There is a large, invasive mass arising from the right peripheral zone and extending into the neurovascular bundle, seminal vesicle, and along the anterolateral right mesorectal fascia. Metastatic right external iliac lymph node. Metastatic lesion in the posterior/superior right acetabulum.\"  - 1/25/2019: CT abdomen and pelvis with IV contrast - \"1. Heterogeneous enhancing mass posteriorly in the upper pole of the right kidney measures 4.5 x 5.8 x 5.7 cm (AP by transverse by craniocaudal). It has a " "small nodular component extending posterior medially which abuts the right psoas muscle. This nodular extension measures 2.1 x 2.1 cm. Minimal stranding about the mass. No definite thrombus within the right renal vein. This renal mass is compatible with a renal cell carcinoma. A paraaortic lymph node situated immediately posterior to the left renal vein measures 1.1 cm in short axis, suspicious for metastasis. 2. A 2 cm right external iliac lymph node is also suspicious for metastases. 3. Multiple sclerotic osseous lesions suspicious for metastases. These include 0.8 and 0.6 cm sclerotic lesions laterally in the right iliac wing (images 53 and 62 respectively). Ill-defined groundglass density laterally in the right acetabulum measuring 1.7 x 1.2 cm corresponds with the lesion identified on MRI. A 0.4 cm groundglass density in the left acetabulum. Sclerotic lesion in the left femoral neck measures 0.9 x 1.6 cm (image 81). Sclerotic metastases would be more compatible with prostate metastases. 4. A 3 subcentimeter hepatic lesions are indeterminate. Metastases would be a consideration. These can be further characterized with liver MRI.\"  - 1/25/2019: NM bone scan - \"There is focal bony uptake in the left femoral neck, right acetabulum, right of midline at the S1 or L5 level of the spine, within multiple bilateral ribs, in the C7 or T1 level of the spine, and anteriorly within the skull. Findings are suspicious for metastases.\"  - 1/31/19: CT chest with contrast - \" No suspicious nodules in the chest.  Stable appearance of a 5.8 cm right renal mass concerning for renal carcinoma until proven otherwise.  Stable indeterminant subcentimeter hypodensities in the liver.  Multifocal osteoblastic metastasis including 2.5 cm lesion in the right fifth rib posteriorly and a 1.6 cm lesion in the right third rib posteriorly. No lytic lesions identified.\"  - 2/6/19: CT-guided right sclerotic fifth rib lesion biopsy - \"Metastatic " "carcinoma, consistent with prostate primary.  Immunohistochemical stains performed show the metastatic carcinoma stains positive for NKX3.1 (prostate marker), negative for STACIE 3 and PAX8, which supports the above diagnosis.\"  - 2/15/19: Started Casodex 50mg every day and consented for the biobanking protocol. 3/18/19 - stopped Casodex.  - 2/19/19: Case discussed in tumor board - recommendation for nephectomy for suspected malignant right renal mass.   - 2/26/19: Started Lupron 22.5mg every 3 months.   - 5/8/19: Started Docetaxel 75mg/m2 IV every 3 weeks. 06/18/19 - cycle 3, 7/10/19 - cycle 4, 07/31/19 - cycle 5, 08/21/19 - cycle 6.   - 10/2/19: Restaging CT CAP and NM Bone Scan showed improved osseous disease, no evidence of recurrent or new metastatic disease.  - 1/5/20: Restaging CT CAP and NM Bone Scan showed stable osseous disease, no evidence of recurrent or new metastatic disease.  - 4/6/20: NM bone scan with slightly improved uptake in known skeletal mets. CT C/A/P with contrast with stable osseous mets, no yusuf enlargement, no new visceral mets etc.  - 04/08/20: Zometa every 3 months.  - 10/5/20: CT C/A/P with contrast and NM bone scan - SD.   - 1/4/21: CT C/A/P with contrast and NM bone scan - SD but slight increase in right 5th rib tracer uptake and in posterior L5 sclerosis.   - 03/29/21: CT C/A/P with contrast - single new right sacral 8mm bone lesion (suspicious), other bone mets stable. No visceral/yusuf disease. Nephrectomy site without recurrence. NM bone scan - SD but \"increased uptake associated with the prior lesions of the lower  cervical spine, posterior right fourth rib and right L5 posterior arch elements. Otherwise unchanged uptake associated with the proximal left femur and left anterior fourth rib. Similar uptake of the left halux, possibly secondary to degenerative osteoarthritis or gout.\"  -  START XTANDI  -  RT to L5 / S1  -  STOPPED XTANDI   -07/11/2022  START " "Cycle 1 Docetaxel     INTERVAL HISTORY:   Walter presents for oncology follow-up visit today and consideration of Cycle 1 Docetaxel. He is here with his wife. He is doing ok. He went on a cruise for vacation in [place] last month and enjoyed it very much. He offers no specific physical complaints today. He reports low back pain and numbness in his left leg has resolved. He denies neuropathy today. He remembered previously tolerating docetaxel overall well in 2019. He did have a mild reaction (numbness in b/l UE and \"red splotchy skin\" per patient report) with cycle 1 and required IV benadryl with subsequent cycles as pre-medication. He reports he does not have diabetes. He says he was put on metformin \"as adjunct to cancer treatment.\" His appetite is good. He denies new pain. Normal bowel function. No nausea/vomiting. His breathing is good. No CP. No fevers or chills. ROS is otherwise negative.     2. Stage III ([id number]), grade 3 of 4, clear-cell RCC of right kidney:  - Incidentally diagnosed as above.   - Underwent curative-intent robotic right radical nephrectomy with Dr. Wesley Cleveland on 3/19/19. No tumor spillage per op report.   - Path showed: \"Histologic Type: Clear cell renal cell carcinoma; Sarcomatoid Features: Not identified; Histologic Grade: Nucleolar grade 3 (WHO/ISUP). Extent Tumor Size: 4.3 cm. Microscopic Tumor Extension: Tumor extension into renal sinus (in vascular structures). Margins: Negative. Tumor Necrosis: Present; focal. Lymph-Vascular Invasion: Not identified.  Pathologic Staging (pTNM) Primary Tumor (pT): pT3a: Tumor extends into renal vein branches/renal sinus. Regional Lymph Nodes (pN): pNX. Number of Lymph Nodes Examined: 0 Distant Metastasis (pM): pM N/A.\"  - Restaging scans as above.    PAST MEDICAL HISTORY:  Past Medical History:   Diagnosis Date     Cancer of kidney, right (H)      Complication of anesthesia     slow wakeup      Malignant neoplasm of prostate metastatic to bone (H) " 2/14/2019     Thyroid disease      PAST SURGICAL HISTORY:   Past Surgical History:   Procedure Laterality Date     CYSTOSCOPY      about 10 years ago     INSERT PORT VASCULAR ACCESS Right 5/2/2019    Procedure: Chest Port Placement;  Surgeon: Tate Burciaga PA-C;  Location: [facility] OR     IR CHEST PORT PLACEMENT > 5 YRS OF AGE  5/2/2019     LAPAROSCOPIC NEPHRECTOMY Right 3/19/2019    Procedure: Right laparoscopic radical nephrectomy;  Surgeon: Wesley Cleveland MD;  Location: [facility] OR      SOCIAL HISTORY:   Social History     Tobacco Use     Smoking status: Never Smoker     Smokeless tobacco: Never Used   Substance Use Topics     Drug use: No     FAMILY HISTORY:   Family History   Problem Relation Age of Onset     Diabetes Father      ALLERGIES:   Allergies   Allergen Reactions     Doxycycline GI Disturbance     CURRENT MEDICATIONS:   Current Outpatient Medications:      amLODIPine (NORVASC) 10 MG tablet, Take 1 tablet (10 mg) by mouth daily, Disp: 30 tablet, Rfl: 0     atorvastatin (LIPITOR) 20 MG tablet, Take 60 mg by mouth daily , Disp: , Rfl:      calcium citrate-vitamin D (CITRACAL) 315-250 MG-UNIT TABS per tablet, Take 650 mg by mouth 2 times daily , Disp: , Rfl:      cetirizine (ZYRTEC) 10 MG tablet, Take 10 mg by mouth as needed for allergies, Disp: , Rfl:      cycloSPORINE (RESTASIS) 0.05 % ophthalmic emulsion, Place 1 drop into both eyes 2 times daily , Disp: , Rfl:      dexamethasone (DECADRON) 4 MG tablet, Take 2 tablets (8 mg) by mouth 2 times daily (with meals) for 3 doses Start evening of Docetaxel infusion and continue for a total of 3 doses., Disp: 6 tablet, Rfl: 9     doxycycline hyclate (VIBRA-TABS) 100 MG tablet, Take 100 mg by mouth daily Daily every other month, Disp: , Rfl:      fluticasone (FLONASE) 50 MCG/ACT nasal spray, Spray 2 sprays in nostril daily as needed, Disp: , Rfl:      Glucosamine-Chondroit-Vit C-Mn (GLUCOSAMINE 1500 COMPLEX) CAPS, Take 1 capsule by mouth daily, Disp:  , Rfl:      levothyroxine (SYNTHROID/LEVOTHROID) 112 MCG tablet, Take 112 mcg by mouth daily, Disp: , Rfl:      loratadine (CLARITIN) 10 MG tablet, , Disp: , Rfl:      MAGNESIUM PO, Take 250 mg by mouth 2 times daily , Disp: , Rfl:      MEBENDAZOLE PO, Take 225 mg by mouth daily , Disp: , Rfl:      metFORMIN (GLUCOPHAGE) 500 MG tablet, Take 1,500 mg by mouth 2 times daily (with meals) 500 mg 1000 at PM, Disp: , Rfl:      methocarbamol (ROBAXIN) 500 MG tablet, Take 1 tablet (500 mg) by mouth 4 times daily as needed for muscle spasms, Disp: 30 tablet, Rfl: 1     methylPREDNISolone (MEDROL DOSEPAK) 4 MG tablet therapy pack, Follow Package Directions, Disp: 21 tablet, Rfl: 0     Multiple Vitamins-Minerals (MULTIVITAMIN ADULT EXTRA C PO), Take 1 tablet by mouth every 24 hours, Disp: , Rfl:      Nutritional Supplements (SALMON OIL) CAPS, Take 2 capsules by mouth daily , Disp: , Rfl:      omeprazole (PRILOSEC) 20 MG DR capsule, Take 20 mg by mouth daily, Disp: , Rfl:      prochlorperazine (COMPAZINE) 10 MG tablet, Take 1 tablet (10 mg) by mouth every 6 hours as needed for nausea or vomiting, Disp: 30 tablet, Rfl: 2     tamsulosin (FLOMAX) 0.4 MG capsule, Take 1 capsule (0.4 mg) by mouth daily, Disp: 30 capsule, Rfl: 3     traMADol (ULTRAM) 50 MG tablet, Take 1 tablet (50 mg) by mouth every 6 hours as needed for severe pain, Disp: 30 tablet, Rfl: 1     predniSONE (DELTASONE) 5 MG tablet, Take 1 tablet (5 mg) by mouth 2 times daily for 21 days, Disp: 42 tablet, Rfl: 0     prochlorperazine (COMPAZINE) 10 MG tablet, Take 1 tablet (10 mg) by mouth every 6 hours as needed for nausea or vomiting (Patient not taking: No sig reported), Disp: 30 tablet, Rfl: 3  No current facility-administered medications for this visit.    Facility-Administered Medications Ordered in Other Visits:      0.9% sodium chloride BOLUS, 250 mL, Intravenous, Once, Jj Winters MD     diphenhydrAMINE (BENADRYL) 50 mg in sodium chloride 0.9 %  intermittent infusion, 50 mg, Intravenous, Once, Taylor Mcneil PA-C     DOCEtaxel (TAXOTERE) 172 mg in sodium chloride in non-PVC container 0.9 % 284 mL infusion, 75 mg/m2 (Treatment Plan Recorded), Intravenous, Once, Jj Winters MD     heparin 100 UNIT/ML injection 5 mL, 5 mL, Intracatheter, Once PRN, Jj Winters MD     ondansetron (ZOFRAN) 8 mg, dexamethasone (DECADRON) 12 mg in sodium chloride 0.9 % 55.2 mL intermittent infusion, , Intravenous, Once, Jj Winters MD     sodium chloride (PF) 0.9% PF flush 3-20 mL, 3-20 mL, Intracatheter, Q1H PRN, Jj Winters MD    Physical Exam:   /80   Pulse 61   Temp 98.2  F (36.8  C) (Oral)   Resp 18   Wt 105.6 kg (232 lb 11.2 oz)   SpO2 97%   BMI 32.20 kg/m     General: well appearing, no acute distress, pleasant male   HEENT: normocephalic, atraumatic, PERRLA, sclerae nonicteric  CV: regular rate and rhythm, no murmurs  Lungs: clear to auscultation bilaterally, no wheezes/rales/rhonchi  Abd: soft, positive bowel sounds, non-distended, non-tender  MSK: full range of motion in all four extremities, no peripheral edema  Neuro: alert and oriented x3, CN grossly intact   Psych: appropriate mood and affect  Skin: no rashes or lesions    ECOG PS 1.    LABORATORY DATA: Reviewed labs today. BMP pending.    Latest Reference Range & Units 07/11/22 09:22   Albumin 3.4 - 5.0 g/dL 3.8   Protein Total 6.8 - 8.8 g/dL 7.3   Alkaline Phosphatase 40 - 150 U/L 78   ALT 0 - 70 U/L 22   AST 0 - 45 U/L 20   Bilirubin Direct 0.0 - 0.2 mg/dL 0.1   Bilirubin Total 0.2 - 1.3 mg/dL 0.6   PSA 0.00 - 4.00 ug/L 2.16   WBC 4.0 - 11.0 10e3/uL 4.2   Hemoglobin 13.3 - 17.7 g/dL 13.2 (L)   Hematocrit 40.0 - 53.0 % 39.4 (L)   Platelet Count 150 - 450 10e3/uL 198   RBC Count 4.40 - 5.90 10e6/uL 4.36 (L)   MCV 78 - 100 fL 90   MCH 26.5 - 33.0 pg 30.3   MCHC 31.5 - 36.5 g/dL 33.5   RDW 10.0 - 15.0 % 12.2   % Neutrophils % 59   % Lymphocytes % 29   % Monocytes % 8   % Eosinophils % 3  "  % Basophils % 1   Absolute Basophils 0.0 - 0.2 10e3/uL 0.0   Absolute Eosinophils 0.0 - 0.7 10e3/uL 0.1   Absolute Immature Granulocytes <=0.4 10e3/uL 0.0   Absolute Lymphocytes 0.8 - 5.3 10e3/uL 1.2   Absolute Monocytes 0.0 - 1.3 10e3/uL 0.3   % Immature Granulocytes % 0   Absolute Neutrophils 1.6 - 8.3 10e3/uL 2.5   Absolute NRBCs 10e3/uL 0.0   NRBCs per 100 WBC <1 /100 0       ASSESSMENT & PLAN: Mr. Reyes is a delightful 62 year old gentleman with metastatic castration sensitive prostate adenocarcinoma as well as an incidentally diagnosed stage III clear-cell renal cell carcinoma of the right kidney (s/p radical R nephrectomy 3/19/19), who is here for a follow-up visit and initiation of docetaxel for now metastatic castration-resistant prostate cancer.     1. Metastatic castration-resistant prostate cancer, with involvement of the bones and RPLN:   - Patient met the criteria for CHAARTED \"high-volume\" metastatic hormone-sensitive prostate cancer. He opted for docetaxel in combination with ADT (per JOSEFA, [id number], FELIPEEDE). Tolerated 6 cycles of docetaxel fairly well (5/8/19 through 8/21/19), with the exception of mild fatigue and mild neuropathy.   - Continue leuprolide 22.5mg every 3 months, last dosed in May  - S/P radiation to  L5/S1 (4/2022) with resolution of low back pain and left leg numbness.   - Obtain blood for CFDNA  - Rising PSA from 0.72 (3/22/22)-->1.79 (5/5/22). He had CT scan and NM Bone Scan done 5/5 which confirmed disease progression. Dr. Winters recommended stopping Xtandi (which he did 5/202) and starting Docetaxel for metastatic castration resistant prostate cancer. He recently went to a cruise in [place] last month. He is here to start Cycle 1 of Docetaxel today. He states he remembered tolerating it overall well back in 2019. He did have a mild reaction with Cycle 1 so IV benadryl was added as pre-medication to subsequent cycles. Reviewed with pharmacy and will plan to add " IV benadryl to pre-medication today and subsequent cycles. Will have infusion nurse review Dex and Prednisone dosing plan with patient. His PSA level today is 2.16 which will serve as his baseline. Discussed that it may take multiple chemotherapy cycles before we see the PSA go down. We will have provider visits prior to each cycle to assess tolerance. Will likely plan for 4-6 cycles of chemotherapy depending on tolerance to treatment, but will confirm with Dr. Winters.     2. Bone metastases:   - Noted to have osseous metastatic disease at the time of diagnosis. Last bone scan (5/5/22) showed new focus of increased uptake left superior acetabulum and distal left femoral diaphysis; slight increased uptake in the left first rib; stable increased uptake in the other metastatic foci.   - Encouraged to continue calcium and vitamin D supplementation.  - Will continue Zometa 4mg IV every 3 months, last dosed on 5/5. Following dose due 7/28, can administer when he RTC for Cycle 2 Docetaxel in 3 weeks      3. Stage 3, UISS-high risk ccRCC: s/p R nephrectomy   - approximately 40-50% risk of recurrence after definitive surgery.   - no clear evidence of residual disease at this time; the retroperitoneal lymphadenopathy is more consistent with metastatic prostate cancer   - Continue active surveillance with self-reporting for signs/symptoms of recurrence (this was previously explained in detail) and periodic H&P and scans.       50 minutes spent on the date of the encounter doing chart review, review of test results, interpretation of tests, patient visit and documentation         Taylor Mcneil PA-C

## 2022-07-11 NOTE — PROGRESS NOTES
Infusion Nursing Note:  Walter Reyes presents today for Cycle 1 Day 1 Taxotere (pt previously received in 2019).    Patient seen by provider today: Yes: KADEN Melissa   present during visit today: Not Applicable.    Note: Pt presents to infusion feeling well. He offers no new concerns following his provider appointment.    KADEN Cooley/Mirtha Rivas RN on 7/11/22 at 1030:  - will add IV Benadryl pre-med, pt reports reaction to first Taxotere in 2019  - ok for treatment today  - educate on take home steroids    Pt aware to take Dexamethasone tonight, Tuesday AM and Tuesday PM. He will then begin taking Prednisone Wednesday AM for 21 days until his next cycle.    Pt not new to infusion - received treatment back in 2019 and Zometa every 3 months. Declined further teaching on Taxotere today, states he has a thermometer at home. Reinforced availability of [facility].    Intravenous Access:  Implanted Port.    Treatment Conditions:  Lab Results   Component Value Date    HGB 13.2 (L) 07/11/2022    WBC 4.2 07/11/2022    ANEU 5.0 07/07/2021    ANEUTAUTO 2.5 07/11/2022     07/11/2022      Lab Results   Component Value Date     05/05/2022    POTASSIUM 4.2 05/05/2022    CR 0.74 05/05/2022    BETHANY 8.7 05/05/2022    BILITOTAL 0.6 07/11/2022    ALBUMIN 3.8 07/11/2022    ALT 22 07/11/2022    AST 20 07/11/2022     Results reviewed, labs MET treatment parameters, ok to proceed with treatment.    Post Infusion Assessment:  Patient tolerated infusion without incident.  Blood return noted pre and post infusion.  Site patent and intact, free from redness, edema or discomfort.  No evidence of extravasations.  Access discontinued per protocol.     Discharge Plan:   Patient declined prescription refills. Pt plans to  Compazine, Dexamethasone and Prednisone at local [facility], orders released.  Discharge instructions reviewed with: Patient.  Patient and/or family verbalized  understanding of discharge instructions and all questions answered.  AVS to patient via N-Trig.  Patient will return ~8/1/22 for next appointment per check out orders, pt aware to watch MyChart.   Patient discharged in stable condition accompanied by: self.  Departure Mode: Ambulatory.      Mirtha Rivas RN

## 2022-07-15 ENCOUNTER — MYC MEDICAL ADVICE (OUTPATIENT)
Dept: ONCOLOGY | Facility: CLINIC | Age: 62
End: 2022-07-15

## 2022-07-15 ENCOUNTER — TELEPHONE (OUTPATIENT)
Dept: ONCOLOGY | Facility: CLINIC | Age: 62
End: 2022-07-15

## 2022-07-16 ENCOUNTER — TELEPHONE (OUTPATIENT)
Dept: ONCOLOGY | Facility: CLINIC | Age: 62
End: 2022-07-16

## 2022-07-16 ENCOUNTER — NURSE TRIAGE (OUTPATIENT)
Dept: NURSING | Facility: CLINIC | Age: 62
End: 2022-07-16

## 2022-07-16 DIAGNOSIS — B37.0 THRUSH: Primary | ICD-10-CM

## 2022-07-16 RX ORDER — NYSTATIN 100000/ML
500000 SUSPENSION, ORAL (FINAL DOSE FORM) ORAL 4 TIMES DAILY
Qty: 200 ML | Refills: 0 | Status: SHIPPED | OUTPATIENT
Start: 2022-07-16 | End: 2022-08-01

## 2022-07-16 RX ORDER — NYSTATIN 100000/ML
500000 SUSPENSION, ORAL (FINAL DOSE FORM) ORAL 4 TIMES DAILY
Qty: 200 ML | Refills: 0 | Status: SHIPPED | OUTPATIENT
Start: 2022-07-16 | End: 2022-07-16

## 2022-07-17 NOTE — TELEPHONE ENCOUNTER
Received call from clinic triage line. Pt reports a white coating to his tongue, tried scrapping with a tongue scraper which broke up the white coating. Denies pain, bleeding, swelling, or dysphagia. Pt with a history of metastatic castration-resistant prostate cancer, recently started on C1D1 doxetaxel with prednisone 7/11. Suspect thrush especially with prednisone. Can trial nystatin swish and swallow, sending script for 10 days to [facility] in [place]. If worsens or develops acute issues like pain or bleeding, he should come be evaluated in person. Will forward to Dr. Winters as FYI.

## 2022-07-17 NOTE — TELEPHONE ENCOUNTER
Nurse Triage SBAR    Is this a 2nd Level Triage? YES, LICENSED PRACTITIONER REVIEW IS REQUIRED    Situation:   Pt calling about concerns of tongue coated white, currently takes compazine and prednisone    Background:   currently on chemo for prostate cancer. Had Chemo 3 years ago and now just restarting again. First session was on monday    Assessment:   denies difficulty swallowing, tongue does feel thicker in the back of throat, does have some dry mouth, no open sores, can drink and eat, tastes mushy, had tried using tongue  and doesn't seem to help, just breaks up the white coating    Protocol Recommended Disposition:   Call PCP Within 24 Hours    Recommendation:   Pt wondering is this normal or could be sign of thrush?    Paged to provider at 0950pm via answering service, try nystatin swish and swallow will send to pharmacy, can get fungal build up, steroids can cause, not necessary dangerous.     Does the patient meet one of the following criteria for ADS visit consideration? 16+ years old, with an [facility] PCP     TIP  Providers, please consider if this condition is appropriate for management at one of our Acute and Diagnostic Services sites.     If patient is a good candidate, please use dotphrase <dot>triageresponse and select Refer to ADS to document.      Provider Recommendation Follow Up:   Reached patient/caregiver. Informed of provider's recommendations. Patient verbalized understanding and agrees with the plan. Pt prefers to have it sent to [facility] on [address] [place] [address] [address] in [place].            Karley Cooper RN, BSN  7/16/2022 at 10:14 PM  [facility]      Reason for Disposition    Receiving chemotherapy or radiation therapy    Additional Information    Negative: Severe difficulty breathing (e.g., struggling for each breath, speaks in single words, stridor)    Negative: Sounds like a life-threatening emergency to the triager    Negative: Injury to tooth or teeth    Negative: Injury to  mouth    Negative: Canker sore suspected (i.e., aphthous ulcer) in the mouth    Negative: Cold sore suspected (i.e., fever blister sore) on the outer lip    Negative: Tooth is painful or swelling around a tooth    Negative: Mouth is painful    Negative: Throat is painful    Negative: Lip swelling    Negative: Tongue swelling    Negative: [1] Drooling or spitting out saliva (because can't swallow) AND [2] new onset    Negative: [1] Bleeding from mouth AND [2] won't stop after 10 minutes of direct pressure    Negative: Electrical burn of mouth    Negative: Chemical burn of mouth    Negative: [1] Difficulty breathing AND [2] not severe    Negative: Patient sounds very sick or weak to the triager    Protocols used: MOUTH SYMPTOMS-A-AH

## 2022-07-18 NOTE — TELEPHONE ENCOUNTER
Forms filled out and placed in provider folder for approval.    Ok Uribe    
STD paperwork received via faax from [employer]. Will be placed in provider folder for signature upon completion.     Fax: [phone]      Ok Uribe  
Signed form received and faxed to [employer], fax # [phone]. Successful transmission verified via rightfax. Copy of forms sent to scanning, original forms mailed to patient's home address on file.    Ok Uribe on 7/18/2022 at 10:59 AM    
none

## 2022-07-29 DIAGNOSIS — C61 MALIGNANT NEOPLASM OF PROSTATE METASTATIC TO BONE (H): Primary | ICD-10-CM

## 2022-07-29 DIAGNOSIS — C79.51 MALIGNANT NEOPLASM OF PROSTATE METASTATIC TO BONE (H): Primary | ICD-10-CM

## 2022-07-29 RX ORDER — MEPERIDINE HYDROCHLORIDE 25 MG/ML
25 INJECTION INTRAMUSCULAR; INTRAVENOUS; SUBCUTANEOUS EVERY 30 MIN PRN
Status: CANCELLED | OUTPATIENT
Start: 2022-08-02

## 2022-07-29 RX ORDER — HEPARIN SODIUM (PORCINE) LOCK FLUSH IV SOLN 100 UNIT/ML 100 UNIT/ML
5 SOLUTION INTRAVENOUS
Status: CANCELLED | OUTPATIENT
Start: 2022-08-02

## 2022-07-29 RX ORDER — LORAZEPAM 2 MG/ML
0.5 INJECTION INTRAMUSCULAR EVERY 4 HOURS PRN
Status: CANCELLED | OUTPATIENT
Start: 2022-08-02

## 2022-07-29 RX ORDER — DIPHENHYDRAMINE HYDROCHLORIDE 50 MG/ML
50 INJECTION INTRAMUSCULAR; INTRAVENOUS
Status: CANCELLED
Start: 2022-08-02

## 2022-07-29 RX ORDER — NALOXONE HYDROCHLORIDE 0.4 MG/ML
0.2 INJECTION, SOLUTION INTRAMUSCULAR; INTRAVENOUS; SUBCUTANEOUS
Status: CANCELLED | OUTPATIENT
Start: 2022-08-02

## 2022-07-29 RX ORDER — ALBUTEROL SULFATE 90 UG/1
1-2 AEROSOL, METERED RESPIRATORY (INHALATION)
Status: CANCELLED
Start: 2022-08-02

## 2022-07-29 RX ORDER — EPINEPHRINE 1 MG/ML
0.3 INJECTION, SOLUTION, CONCENTRATE INTRAVENOUS EVERY 5 MIN PRN
Status: CANCELLED | OUTPATIENT
Start: 2022-08-02

## 2022-07-29 RX ORDER — ALBUTEROL SULFATE 0.83 MG/ML
2.5 SOLUTION RESPIRATORY (INHALATION)
Status: CANCELLED | OUTPATIENT
Start: 2022-08-02

## 2022-07-29 RX ORDER — HEPARIN SODIUM,PORCINE 10 UNIT/ML
5 VIAL (ML) INTRAVENOUS
Status: CANCELLED | OUTPATIENT
Start: 2022-08-02

## 2022-08-01 ENCOUNTER — VIRTUAL VISIT (OUTPATIENT)
Dept: ONCOLOGY | Facility: CLINIC | Age: 62
End: 2022-08-01
Attending: PHYSICIAN ASSISTANT
Payer: COMMERCIAL

## 2022-08-01 ENCOUNTER — APPOINTMENT (OUTPATIENT)
Dept: LAB | Facility: CLINIC | Age: 62
End: 2022-08-01
Attending: PHYSICIAN ASSISTANT
Payer: COMMERCIAL

## 2022-08-01 VITALS
TEMPERATURE: 97.9 F | RESPIRATION RATE: 16 BRPM | BODY MASS INDEX: 32.56 KG/M2 | DIASTOLIC BLOOD PRESSURE: 79 MMHG | HEART RATE: 62 BPM | SYSTOLIC BLOOD PRESSURE: 118 MMHG | WEIGHT: 235.3 LBS | OXYGEN SATURATION: 97 %

## 2022-08-01 DIAGNOSIS — B37.0 THRUSH: ICD-10-CM

## 2022-08-01 DIAGNOSIS — C79.51 MALIGNANT NEOPLASM OF PROSTATE METASTATIC TO BONE (H): Primary | ICD-10-CM

## 2022-08-01 DIAGNOSIS — C64.1 RENAL CELL CARCINOMA, RIGHT (H): ICD-10-CM

## 2022-08-01 DIAGNOSIS — C61 MALIGNANT NEOPLASM OF PROSTATE METASTATIC TO BONE (H): Primary | ICD-10-CM

## 2022-08-01 LAB
ALBUMIN SERPL-MCNC: 3.7 G/DL (ref 3.4–5)
ALP SERPL-CCNC: 80 U/L (ref 40–150)
ALT SERPL W P-5'-P-CCNC: 32 U/L (ref 0–70)
ANION GAP SERPL CALCULATED.3IONS-SCNC: 8 MMOL/L (ref 3–14)
AST SERPL W P-5'-P-CCNC: 13 U/L (ref 0–45)
BASOPHILS # BLD AUTO: 0 10E3/UL (ref 0–0.2)
BASOPHILS NFR BLD AUTO: 0 %
BILIRUB SERPL-MCNC: 0.4 MG/DL (ref 0.2–1.3)
BUN SERPL-MCNC: 15 MG/DL (ref 7–30)
CALCIUM SERPL-MCNC: 8.8 MG/DL (ref 8.5–10.1)
CHLORIDE BLD-SCNC: 111 MMOL/L (ref 94–109)
CO2 SERPL-SCNC: 24 MMOL/L (ref 20–32)
CREAT SERPL-MCNC: 0.88 MG/DL (ref 0.66–1.25)
EOSINOPHIL # BLD AUTO: 0 10E3/UL (ref 0–0.7)
EOSINOPHIL NFR BLD AUTO: 0 %
ERYTHROCYTE [DISTWIDTH] IN BLOOD BY AUTOMATED COUNT: 13.4 % (ref 10–15)
GFR SERPL CREATININE-BSD FRML MDRD: >90 ML/MIN/1.73M2
GLUCOSE BLD-MCNC: 115 MG/DL (ref 70–99)
HCT VFR BLD AUTO: 38.5 % (ref 40–53)
HGB BLD-MCNC: 12.5 G/DL (ref 13.3–17.7)
IMM GRANULOCYTES # BLD: 0.1 10E3/UL
IMM GRANULOCYTES NFR BLD: 1 %
LYMPHOCYTES # BLD AUTO: 1.2 10E3/UL (ref 0.8–5.3)
LYMPHOCYTES NFR BLD AUTO: 21 %
MCH RBC QN AUTO: 30 PG (ref 26.5–33)
MCHC RBC AUTO-ENTMCNC: 32.5 G/DL (ref 31.5–36.5)
MCV RBC AUTO: 92 FL (ref 78–100)
MONOCYTES # BLD AUTO: 0.4 10E3/UL (ref 0–1.3)
MONOCYTES NFR BLD AUTO: 7 %
NEUTROPHILS # BLD AUTO: 4.3 10E3/UL (ref 1.6–8.3)
NEUTROPHILS NFR BLD AUTO: 71 %
NRBC # BLD AUTO: 0 10E3/UL
NRBC BLD AUTO-RTO: 0 /100
PLATELET # BLD AUTO: 179 10E3/UL (ref 150–450)
POTASSIUM BLD-SCNC: 4.1 MMOL/L (ref 3.4–5.3)
PROT SERPL-MCNC: 7 G/DL (ref 6.8–8.8)
PSA SERPL-MCNC: 1.78 UG/L (ref 0–4)
RBC # BLD AUTO: 4.17 10E6/UL (ref 4.4–5.9)
SODIUM SERPL-SCNC: 143 MMOL/L (ref 133–144)
WBC # BLD AUTO: 6.1 10E3/UL (ref 4–11)

## 2022-08-01 PROCEDURE — G0463 HOSPITAL OUTPT CLINIC VISIT: HCPCS

## 2022-08-01 PROCEDURE — 36415 COLL VENOUS BLD VENIPUNCTURE: CPT

## 2022-08-01 PROCEDURE — 80053 COMPREHEN METABOLIC PANEL: CPT

## 2022-08-01 PROCEDURE — 84403 ASSAY OF TOTAL TESTOSTERONE: CPT | Performed by: INTERNAL MEDICINE

## 2022-08-01 PROCEDURE — 85025 COMPLETE CBC W/AUTO DIFF WBC: CPT

## 2022-08-01 PROCEDURE — 99215 OFFICE O/P EST HI 40 MIN: CPT | Performed by: NURSE PRACTITIONER

## 2022-08-01 PROCEDURE — 84153 ASSAY OF PSA TOTAL: CPT

## 2022-08-01 RX ORDER — NYSTATIN 100000/ML
500000 SUSPENSION, ORAL (FINAL DOSE FORM) ORAL 4 TIMES DAILY
Qty: 200 ML | Refills: 0 | Status: SHIPPED | OUTPATIENT
Start: 2022-08-01 | End: 2022-08-25

## 2022-08-01 RX ORDER — HEPARIN SODIUM (PORCINE) LOCK FLUSH IV SOLN 100 UNIT/ML 100 UNIT/ML
500 SOLUTION INTRAVENOUS ONCE
Status: DISCONTINUED | OUTPATIENT
Start: 2022-08-01 | End: 2022-08-01 | Stop reason: CLARIF

## 2022-08-01 ASSESSMENT — PAIN SCALES - GENERAL: PAINLEVEL: NO PAIN (0)

## 2022-08-01 NOTE — NURSING NOTE
Chief Complaint   Patient presents with     Blood Draw     Labs drawn via  by RN. Vitals taken.     Labs collected from venipuncture by RN. Vitals taken. Checked in for appointment(s).    Angella Erazo RN

## 2022-08-01 NOTE — PROGRESS NOTES
"  [facility] Oncology Followup  Oncologist: Dr. Jj Winters  Aug 1, 2022           REASON FOR VISIT: Follow-up for metastatic castration-resistant prostate cancer, here for Cycle 1 Docetaxel    HISTORY OF PRESENT ILLNESS: Mr. 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 His oncologic history is detailed below.     1/6/21  PSA = 0.29  3/31/21  PSA = 0.44  7/7/21  PSA = 0.47  11/2021  PSA = 1.05  -- Start XTANDI  12/16/21 PSA=0.22  2/11/22  PSA= 0.50  3/22/22  PSA=0.72  5/5/2022 PSA=1.79  7/11/2022 PSA=2.16 --Start cycle 1 docetaxel     Interval History:.     ONCOLOGIC HISTORY:  1. 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 Naylor at [facility]. Prior PSA were 1.4 on 8/16/17, 2.4 on 6/28/16, 2.9 on 6/28/16, and as low as 0.4 on 3/26/2003.   - 1/08/2019: Consultation with Sarah Wilson CNP in Urology clinic. Repeat PSA 9.6.  - 1/16/2019: MRI prostate with contrast - \"This examination is characterized as PIRADS 5- very high probability. Clinically significant cancer is highly likely to be present. There is a large, invasive mass arising from the right peripheral zone and extending into the neurovascular bundle, seminal vesicle, and along the anterolateral right mesorectal fascia. Metastatic right external iliac lymph node. Metastatic lesion in the posterior/superior right acetabulum.\"  - 1/25/2019: CT abdomen and pelvis with IV contrast - \"1. Heterogeneous enhancing mass posteriorly in the upper pole of the right kidney measures 4.5 x 5.8 x 5.7 cm (AP by transverse by craniocaudal). It has a small nodular component extending posterior medially which abuts the right psoas muscle. This nodular " "extension measures 2.1 x 2.1 cm. Minimal stranding about the mass. No definite thrombus within the right renal vein. This renal mass is compatible with a renal cell carcinoma. A paraaortic lymph node situated immediately posterior to the left renal vein measures 1.1 cm in short axis, suspicious for metastasis. 2. A 2 cm right external iliac lymph node is also suspicious for metastases. 3. Multiple sclerotic osseous lesions suspicious for metastases. These include 0.8 and 0.6 cm sclerotic lesions laterally in the right iliac wing (images 53 and 62 respectively). Ill-defined groundglass density laterally in the right acetabulum measuring 1.7 x 1.2 cm corresponds with the lesion identified on MRI. A 0.4 cm groundglass density in the left acetabulum. Sclerotic lesion in the left femoral neck measures 0.9 x 1.6 cm (image 81). Sclerotic metastases would be more compatible with prostate metastases. 4. A 3 subcentimeter hepatic lesions are indeterminate. Metastases would be a consideration. These can be further characterized with liver MRI.\"  - 1/25/2019: NM bone scan - \"There is focal bony uptake in the left femoral neck, right acetabulum, right of midline at the S1 or L5 level of the spine, within multiple bilateral ribs, in the C7 or T1 level of the spine, and anteriorly within the skull. Findings are suspicious for metastases.\"  - 1/31/19: CT chest with contrast - \" No suspicious nodules in the chest.  Stable appearance of a 5.8 cm right renal mass concerning for renal carcinoma until proven otherwise.  Stable indeterminant subcentimeter hypodensities in the liver.  Multifocal osteoblastic metastasis including 2.5 cm lesion in the right fifth rib posteriorly and a 1.6 cm lesion in the right third rib posteriorly. No lytic lesions identified.\"  - 2/6/19: CT-guided right sclerotic fifth rib lesion biopsy - \"Metastatic carcinoma, consistent with prostate primary.  Immunohistochemical stains performed show the metastatic " "carcinoma stains positive for NKX3.1 (prostate marker), negative for STACIE 3 and PAX8, which supports the above diagnosis.\"  - 2/15/19: Started Casodex 50mg every day and consented for the biobanking protocol. 3/18/19 - stopped Casodex.  - 2/19/19: Case discussed in tumor board - recommendation for nephectomy for suspected malignant right renal mass.   - 2/26/19: Started Lupron 22.5mg every 3 months.   - 5/8/19: Started Docetaxel 75mg/m2 IV every 3 weeks. 06/18/19 - cycle 3, 7/10/19 - cycle 4, 07/31/19 - cycle 5, 08/21/19 - cycle 6.   - 10/2/19: Restaging CT CAP and NM Bone Scan showed improved osseous disease, no evidence of recurrent or new metastatic disease.  - 1/5/20: Restaging CT CAP and NM Bone Scan showed stable osseous disease, no evidence of recurrent or new metastatic disease.  - 4/6/20: NM bone scan with slightly improved uptake in known skeletal mets. CT C/A/P with contrast with stable osseous mets, no yusuf enlargement, no new visceral mets etc.  - 04/08/20: Zometa every 3 months.  - 10/5/20: CT C/A/P with contrast and NM bone scan - SD.   - 1/4/21: CT C/A/P with contrast and NM bone scan - SD but slight increase in right 5th rib tracer uptake and in posterior L5 sclerosis.   - 03/29/21: CT C/A/P with contrast - single new right sacral 8mm bone lesion (suspicious), other bone mets stable. No visceral/yusuf disease. Nephrectomy site without recurrence. NM bone scan - SD but \"increased uptake associated with the prior lesions of the lower  cervical spine, posterior right fourth rib and right L5 posterior arch elements. Otherwise unchanged uptake associated with the proximal left femur and left anterior fourth rib. Similar uptake of the left halux, possibly secondary to degenerative osteoarthritis or gout.\"  -  START XTANDI  -  RT to L5 / S1  -  STOPPED XTANDI   -07/11/2022  START Cycle 1 Docetaxel     2. Stage III ([id number]), grade 3 of 4, clear-cell RCC of right kidney:  - " "Incidentally diagnosed as above.   - Underwent curative-intent robotic right radical nephrectomy with Dr. Wesley Cleveland on 3/19/19. No tumor spillage per op report.   - Path showed: \"Histologic Type: Clear cell renal cell carcinoma; Sarcomatoid Features: Not identified; Histologic Grade: Nucleolar grade 3 (WHO/ISUP). Extent Tumor Size: 4.3 cm. Microscopic Tumor Extension: Tumor extension into renal sinus (in vascular structures). Margins: Negative. Tumor Necrosis: Present; focal. Lymph-Vascular Invasion: Not identified.  Pathologic Staging (pTNM) Primary Tumor (pT): pT3a: Tumor extends into renal vein branches/renal sinus. Regional Lymph Nodes (pN): pNX. Number of Lymph Nodes Examined: 0 Distant Metastasis (pM): pM N/A.\"  - Restaging scans as above.    INTERVAL HISTORY:   Walter presents for oncology follow-up visit today prior to Cycle 2 Docetaxel. He reports cycle 1 went well with well tolerated SE. He had some gum sensitivity and taste changes. He also developed thrush which responded to nystatin. His hair is thinning with associated scalp tenderness. He denies neuropathy. His energy is good. Going to [place] next week with family. His appetite is good. He denies new pain. Normal bowel function, notes loose muscle tone but no incontinence. No nausea/vomiting. His breathing is good. No CP. No fevers or chills. ROS is otherwise negative.       PAST MEDICAL HISTORY:  Past Medical History:   Diagnosis Date     Cancer of kidney, right (H)      Complication of anesthesia     slow wakeup      Malignant neoplasm of prostate metastatic to bone (H) 2/14/2019     Thyroid disease      PAST SURGICAL HISTORY:   Past Surgical History:   Procedure Laterality Date     CYSTOSCOPY      about 10 years ago     INSERT PORT VASCULAR ACCESS Right 5/2/2019    Procedure: Chest Port Placement;  Surgeon: Tate Burciaga PA-C;  Location: [facility] OR     IR CHEST PORT PLACEMENT > 5 YRS OF AGE  5/2/2019     LAPAROSCOPIC NEPHRECTOMY Right " 3/19/2019    Procedure: Right laparoscopic radical nephrectomy;  Surgeon: Wesley Cleveland MD;  Location: [facility] OR      SOCIAL HISTORY:   Social History     Tobacco Use     Smoking status: Never Smoker     Smokeless tobacco: Never Used   Substance Use Topics     Drug use: No     FAMILY HISTORY:   Family History   Problem Relation Age of Onset     Diabetes Father      ALLERGIES:   Allergies   Allergen Reactions     Doxycycline GI Disturbance     CURRENT MEDICATIONS:   Current Outpatient Medications:      amLODIPine (NORVASC) 10 MG tablet, Take 1 tablet (10 mg) by mouth daily, Disp: 30 tablet, Rfl: 0     atorvastatin (LIPITOR) 20 MG tablet, Take 60 mg by mouth daily , Disp: , Rfl:      calcium citrate-vitamin D (CITRACAL) 315-250 MG-UNIT TABS per tablet, Take 650 mg by mouth 2 times daily , Disp: , Rfl:      cetirizine (ZYRTEC) 10 MG tablet, Take 10 mg by mouth as needed for allergies, Disp: , Rfl:      cycloSPORINE (RESTASIS) 0.05 % ophthalmic emulsion, Place 1 drop into both eyes 2 times daily , Disp: , Rfl:      dexamethasone (DECADRON) 4 MG tablet, Take 2 tablets (8 mg) by mouth 2 times daily (with meals) for 3 doses Start evening of Docetaxel infusion and continue for a total of 3 doses., Disp: 6 tablet, Rfl: 9     doxycycline hyclate (VIBRA-TABS) 100 MG tablet, Take 100 mg by mouth daily Daily every other month, Disp: , Rfl:      fluticasone (FLONASE) 50 MCG/ACT nasal spray, Spray 2 sprays in nostril daily as needed, Disp: , Rfl:      Glucosamine-Chondroit-Vit C-Mn (GLUCOSAMINE 1500 COMPLEX) CAPS, Take 1 capsule by mouth daily, Disp: , Rfl:      levothyroxine (SYNTHROID/LEVOTHROID) 112 MCG tablet, Take 112 mcg by mouth daily, Disp: , Rfl:      loratadine (CLARITIN) 10 MG tablet, , Disp: , Rfl:      MAGNESIUM PO, Take 250 mg by mouth 2 times daily , Disp: , Rfl:      MEBENDAZOLE PO, Take 225 mg by mouth daily , Disp: , Rfl:      metFORMIN (GLUCOPHAGE) 500 MG tablet, Take 1,500 mg by mouth 2 times daily  (with meals) 500 mg 1000 at PM, Disp: , Rfl:      methocarbamol (ROBAXIN) 500 MG tablet, Take 1 tablet (500 mg) by mouth 4 times daily as needed for muscle spasms, Disp: 30 tablet, Rfl: 1     methylPREDNISolone (MEDROL DOSEPAK) 4 MG tablet therapy pack, Follow Package Directions, Disp: 21 tablet, Rfl: 0     Multiple Vitamins-Minerals (MULTIVITAMIN ADULT EXTRA C PO), Take 1 tablet by mouth every 24 hours, Disp: , Rfl:      Nutritional Supplements (SALMON OIL) CAPS, Take 2 capsules by mouth daily , Disp: , Rfl:      nystatin (MYCOSTATIN) 242518 UNIT/ML suspension, Take 5 mLs (500,000 Units) by mouth 4 times daily, Disp: 200 mL, Rfl: 0     omeprazole (PRILOSEC) 20 MG DR capsule, Take 20 mg by mouth daily, Disp: , Rfl:      predniSONE (DELTASONE) 5 MG tablet, Take 1 tablet (5 mg) by mouth 2 times daily for 21 days, Disp: 42 tablet, Rfl: 0     prochlorperazine (COMPAZINE) 10 MG tablet, Take 1 tablet (10 mg) by mouth every 6 hours as needed for nausea or vomiting, Disp: 30 tablet, Rfl: 2     prochlorperazine (COMPAZINE) 10 MG tablet, Take 1 tablet (10 mg) by mouth every 6 hours as needed for nausea or vomiting (Patient not taking: No sig reported), Disp: 30 tablet, Rfl: 3     tamsulosin (FLOMAX) 0.4 MG capsule, Take 1 capsule (0.4 mg) by mouth daily, Disp: 30 capsule, Rfl: 3     traMADol (ULTRAM) 50 MG tablet, Take 1 tablet (50 mg) by mouth every 6 hours as needed for severe pain, Disp: 30 tablet, Rfl: 1    Physical Exam:   /79 (BP Location: Right arm)   Pulse 62   Temp 97.9  F (36.6  C) (Oral)   Resp 16   Wt 106.7 kg (235 lb 4.8 oz)   SpO2 97%   BMI 32.56 kg/m     General: well appearing, no acute distress, pleasant male   HEENT: normocephalic, atraumatic, PERRLA, sclerae nonicteric. No thrush  CV: regular rate and rhythm, no murmurs  Lungs: clear to auscultation bilaterally, no wheezes/rales/rhonchi  Abd: soft, positive bowel sounds, non-distended, non-tender  MSK: full range of motion in all four  "extremities, no peripheral edema  Neuro: alert and oriented x3, CN grossly intact   Psych: appropriate mood and affect  Skin: no rashes or lesions    ECOG PS 1.    LABORATORY DATA:  Most Recent 3 CBC's:Recent Labs   Lab Test 08/01/22  1113 07/11/22  0922 05/05/22  1114   WBC 6.1 4.2 4.2   HGB 12.5* 13.2* 12.8*   MCV 92 90 90    198 203   ANEUTAUTO 4.3 2.5 2.6     Most Recent 3 BMP's:  Recent Labs   Lab Test 08/01/22  1113 07/11/22  0922 05/05/22  1114    140 137   POTASSIUM 4.1 4.0 4.2   CHLORIDE 111* 108 106   CO2 24 24 26   BUN 15 12 10   CR 0.88 0.88 0.74   ANIONGAP 8 8 5   BETHANY 8.8 8.6 8.7   * 103* 96   PROTTOTAL 7.0 7.3 6.7*   ALBUMIN 3.7 3.8 3.5    Most Recent 3 LFT's:  Recent Labs   Lab Test 08/01/22  1113 07/11/22  0922 05/05/22  1114   AST 13 20 15   ALT 32 22 20   ALKPHOS 80 78 76   BILITOTAL 0.4 0.6 0.4    Most Recent 2 TSH and T4:  Recent Labs   Lab Test 07/07/21  0834 03/31/21  0824   TSH 1.67 1.63     I reviewed the above labs today.      ASSESSMENT & PLAN: Mr. Reyes is a delightful 62 year old gentleman with metastatic castration sensitive prostate adenocarcinoma as well as an incidentally diagnosed stage III clear-cell renal cell carcinoma of the right kidney (s/p radical R nephrectomy 3/19/19), who is here for a follow-up visit and initiation of docetaxel for now metastatic castration-resistant prostate cancer.     1. Metastatic castration-resistant prostate cancer, with involvement of the bones and RPLN:   - Patient met the criteria for CHAARTED \"high-volume\" metastatic hormone-sensitive prostate cancer. He opted for docetaxel in combination with ADT (per JOSEFA, [id number], STAMPEDE). Tolerated 6 cycles of docetaxel fairly well (5/8/19 through 8/21/19), with the exception of mild fatigue and mild neuropathy.   - Continue leuprolide 22.5mg every 3 months, last dosed in May--give tomorrow  - S/P radiation to  L5/S1 (4/2022) with resolution of low back pain and left leg " numbness.   - Obtain blood for CFDNA  - Rising PSA from 0.72 (3/22/22)-->1.79 (5/5/22). He had CT scan and NM Bone Scan done 5/5 which confirmed disease progression. Stopped Xtandi and began Docetaxel for metastatic castration resistant prostate cancer. Tolerated cycle 1 well, IV benadryl as premed without reaction so will keep. His PSA level today is pending, pre-docetaxel PSA 2.16  -labs and assessment ok to proceed with cycle 2 tomorrow  -Plan for 4-6 cycles of chemotherapy depending on tolerance to treatment. Will request additional two infusions per pt request.     2. Bone metastases:   - Noted to have osseous metastatic disease at the time of diagnosis. Last bone scan (5/5/22) showed new focus of increased uptake left superior acetabulum and distal left femoral diaphysis; slight increased uptake in the left first rib; stable increased uptake in the other metastatic foci.   - Encouraged to continue calcium and vitamin D supplementation.  - Will continue Zometa 4mg IV every ~3 months, last dosed on 5/5. Following dose due 7/28, can administer when he RTC for Cycle 2 Docetaxel in 3 weeks      3. Stage 3, UISS-high risk ccRCC: s/p R nephrectomy   - approximately 40-50% risk of recurrence after definitive surgery.   - no clear evidence of residual disease at this time; the retroperitoneal lymphadenopathy is more consistent with metastatic prostate cancer   - Continue active surveillance with self-reporting for signs/symptoms of recurrence (this was previously explained in detail) and periodic H&P and scans.    4. Thrush  Will write for Rx nystatin he can fill prior to his vacation next week if needed. Consider fluconazole maintenance if repeats each cycle    40 minutes spent on the date of the encounter doing chart review, review of test results, interpretation of tests, patient visit, documentation and discussion with other provider(s)     Veda Greenwood CNP on 8/1/2022 at 12:07 PM

## 2022-08-01 NOTE — NURSING NOTE
"Oncology Rooming Note    Oncology Rooming Note    August 1, 2022 11:30 AM   Walter Reyes is a 62 year old male who presents for:    Chief Complaint   Patient presents with     Blood Draw     Labs drawn via  by RN. Vitals taken.     RECHECK     Here for provider visit r/t Malignant neoplasm of prostate to bone.       Initial Vitals: /79 (BP Location: Right arm)   Pulse 62   Temp 97.9  F (36.6  C) (Oral)   Resp 16   Wt 106.7 kg (235 lb 4.8 oz)   SpO2 97%   BMI 32.56 kg/m   Estimated body mass index is 32.56 kg/m  as calculated from the following:    Height as of 5/9/22: 1.811 m (5' 11.28\").    Weight as of this encounter: 106.7 kg (235 lb 4.8 oz). Body surface area is 2.32 meters squared.  No Pain (0) Comment: Data Unavailable   No LMP for male patient.  Allergies reviewed: Yes  Medications reviewed: Yes    Medications: Medication refills not needed today.  Pharmacy name entered into EPIC:    [facility] - [place], [place] - [address] [id number] - [place] - [address] [facility] [place], [place] - [address] [place] [address] PHARMACY [place], [place] - [address] [facility] [address]  [id number] [place], [place] - [address] [place] [address] [address] [facility] [address] [facility] [address]    Clinical concerns: None     Len Luna RN              "

## 2022-08-02 ENCOUNTER — INFUSION THERAPY VISIT (OUTPATIENT)
Dept: ONCOLOGY | Facility: CLINIC | Age: 62
End: 2022-08-02
Attending: PHYSICIAN ASSISTANT
Payer: COMMERCIAL

## 2022-08-02 VITALS
TEMPERATURE: 98.5 F | DIASTOLIC BLOOD PRESSURE: 82 MMHG | HEART RATE: 62 BPM | OXYGEN SATURATION: 96 % | RESPIRATION RATE: 16 BRPM | SYSTOLIC BLOOD PRESSURE: 125 MMHG

## 2022-08-02 DIAGNOSIS — C61 MALIGNANT NEOPLASM OF PROSTATE METASTATIC TO BONE (H): Primary | ICD-10-CM

## 2022-08-02 DIAGNOSIS — C79.51 MALIGNANT NEOPLASM OF PROSTATE METASTATIC TO BONE (H): Primary | ICD-10-CM

## 2022-08-02 PROCEDURE — 96402 CHEMO HORMON ANTINEOPL SQ/IM: CPT

## 2022-08-02 PROCEDURE — 96367 TX/PROPH/DG ADDL SEQ IV INF: CPT

## 2022-08-02 PROCEDURE — 258N000003 HC RX IP 258 OP 636: Performed by: INTERNAL MEDICINE

## 2022-08-02 PROCEDURE — 250N000011 HC RX IP 250 OP 636: Performed by: INTERNAL MEDICINE

## 2022-08-02 PROCEDURE — 96375 TX/PRO/DX INJ NEW DRUG ADDON: CPT

## 2022-08-02 PROCEDURE — 96413 CHEMO IV INFUSION 1 HR: CPT

## 2022-08-02 PROCEDURE — 93010 ELECTROCARDIOGRAM REPORT: CPT | Performed by: INTERNAL MEDICINE

## 2022-08-02 PROCEDURE — 93005 ELECTROCARDIOGRAM TRACING: CPT

## 2022-08-02 RX ORDER — HEPARIN SODIUM (PORCINE) LOCK FLUSH IV SOLN 100 UNIT/ML 100 UNIT/ML
5 SOLUTION INTRAVENOUS
Status: DISCONTINUED | OUTPATIENT
Start: 2022-08-02 | End: 2022-08-02 | Stop reason: HOSPADM

## 2022-08-02 RX ORDER — PREDNISONE 5 MG/1
5 TABLET ORAL 2 TIMES DAILY
Qty: 42 TABLET | Refills: 0 | Status: SHIPPED | OUTPATIENT
Start: 2022-08-02 | End: 2022-08-23

## 2022-08-02 RX ORDER — ZOLEDRONIC ACID 0.04 MG/ML
4 INJECTION, SOLUTION INTRAVENOUS ONCE
Status: COMPLETED | OUTPATIENT
Start: 2022-08-02 | End: 2022-08-02

## 2022-08-02 RX ORDER — HEPARIN SODIUM,PORCINE 10 UNIT/ML
5 VIAL (ML) INTRAVENOUS
Status: CANCELLED | OUTPATIENT
Start: 2022-08-12

## 2022-08-02 RX ORDER — ZOLEDRONIC ACID 0.04 MG/ML
4 INJECTION, SOLUTION INTRAVENOUS ONCE
Status: CANCELLED | OUTPATIENT
Start: 2022-08-12 | End: 2022-08-12

## 2022-08-02 RX ORDER — HEPARIN SODIUM (PORCINE) LOCK FLUSH IV SOLN 100 UNIT/ML 100 UNIT/ML
5 SOLUTION INTRAVENOUS
Status: CANCELLED | OUTPATIENT
Start: 2022-08-12

## 2022-08-02 RX ADMIN — ZOLEDRONIC ACID 4 MG: 0.04 INJECTION, SOLUTION INTRAVENOUS at 16:09

## 2022-08-02 RX ADMIN — DIPHENHYDRAMINE HYDROCHLORIDE 50 MG: 50 INJECTION INTRAMUSCULAR; INTRAVENOUS at 14:07

## 2022-08-02 RX ADMIN — SODIUM CHLORIDE 250 ML: 9 INJECTION, SOLUTION INTRAVENOUS at 14:05

## 2022-08-02 RX ADMIN — Medication 5 ML: at 16:31

## 2022-08-02 RX ADMIN — DEXAMETHASONE SODIUM PHOSPHATE: 10 INJECTION, SOLUTION INTRAMUSCULAR; INTRAVENOUS at 14:33

## 2022-08-02 RX ADMIN — DOCETAXEL 172 MG: 160 INJECTION, SOLUTION INTRAVENOUS at 15:05

## 2022-08-02 RX ADMIN — LEUPROLIDE ACETATE 22.5 MG: KIT at 15:07

## 2022-08-02 ASSESSMENT — PAIN SCALES - GENERAL: PAINLEVEL: NO PAIN (0)

## 2022-08-02 NOTE — PROGRESS NOTES
Infusion Nursing Note:  Walter Reyes presents today for Cycle 2 Day 1 Taxotere, Zometa and Lupron.    Patient seen by provider today: No   present during visit today: Not Applicable.    Note: Pt presents to infusion feeling well. He is bradycardic today - HR in high 40s/low 50s, baseline is usually mid 60s. Pt states that his Apple Watch actually woke him up last night with a notification of sustained HR under 40. He denies lightheadedness/SOB/chest pain. He isn't aware of any cardiac history, has taken amlodipine in the past for hypertension but not taking currently.    KAREN Greenwood NP/Mirtha Rivas RN on 8/2/22 at 1415:  - ok to order EKG    EKG resulted: Sinus rhythm with frequent PVCs. Per PATRICE Mccollum - no changes today if not symptomatic, have pt report EKG results to PCP to see if they would like further follow-up. Pt aware.    Pt confirms he is taking Calcium and Vitamin D at home, denies any dental concerns or procedures. He is aware to take his dose of Dexamethasone tonight, tomorrow AM and tomorrow PM, and then begin Prednisone twice daily starting 8/4.    Intravenous Access:  Implanted Port.    Treatment Conditions:  Lab Results   Component Value Date    HGB 12.5 (L) 08/01/2022    WBC 6.1 08/01/2022    ANEU 5.0 07/07/2021    ANEUTAUTO 4.3 08/01/2022     08/01/2022      Lab Results   Component Value Date     08/01/2022    POTASSIUM 4.1 08/01/2022    CR 0.88 08/01/2022    BETHANY 8.8 08/01/2022    BILITOTAL 0.4 08/01/2022    ALBUMIN 3.7 08/01/2022    ALT 32 08/01/2022    AST 13 08/01/2022     Results reviewed, labs MET treatment parameters, ok to proceed with treatment.    Post Infusion Assessment:  Patient tolerated infusion without incident.  Patient tolerated injection to LEFT ventrogluteal without incident.  Blood return noted pre and post infusion.  Site patent and intact, free from redness, edema or discomfort.  No evidence of extravasations.  Access discontinued  per protocol.     Discharge Plan:   Prescription refills given for Dexamethasone and Prednisone.  Discharge instructions reviewed with: Patient.  Patient and/or family verbalized understanding of discharge instructions and all questions answered.  AVS to patient via [employer].  Patient will return 8/22/22 for next appointment.   Patient discharged in stable condition accompanied by: self.  Departure Mode: Ambulatory.      Mirtha Rivas RN

## 2022-08-02 NOTE — PATIENT INSTRUCTIONS
[facility] Triage and after hours / weekends / holidays:  [phone]    Please call the triage or after hours line if you experience a temperature greater than or equal to 100.4, shaking chills, have uncontrolled nausea, vomiting and/or diarrhea, dizziness, shortness of breath, chest pain, bleeding, unexplained bruising, or if you have any other new/concerning symptoms, questions or concerns.      If you are having any concerning symptoms or wish to speak to a provider before your next infusion visit, please call your care coordinator or triage to notify them so we can adequately serve you.     If you need a refill on a narcotic prescription or other medication, please call before your infusion appointment.                       Lab Results:  Recent Results (from the past 12 hour(s))   EKG 12-lead, complete    Collection Time: 08/02/22  2:24 PM   Result Value Ref Range    Systolic Blood Pressure  mmHg    Diastolic Blood Pressure  mmHg    Ventricular Rate 63 BPM    Atrial Rate 63 BPM    MT Interval 152 ms    QRS Duration 86 ms     ms    QTc 472 ms    P Axis 45 degrees    R AXIS 14 degrees    T Axis 63 degrees    Interpretation ECG       Sinus rhythm with frequent Premature ventricular complexes  Otherwise normal ECG  When compared with ECG of 19-MAR-2019 12:05,  Premature ventricular complexes are now Present

## 2022-08-03 LAB
ATRIAL RATE - MUSE: 63 BPM
DIASTOLIC BLOOD PRESSURE - MUSE: NORMAL MMHG
INTERPRETATION ECG - MUSE: NORMAL
P AXIS - MUSE: 45 DEGREES
PR INTERVAL - MUSE: 152 MS
QRS DURATION - MUSE: 86 MS
QT - MUSE: 462 MS
QTC - MUSE: 472 MS
R AXIS - MUSE: 14 DEGREES
SYSTOLIC BLOOD PRESSURE - MUSE: NORMAL MMHG
T AXIS - MUSE: 63 DEGREES
TESTOST SERPL-MCNC: <2 NG/DL (ref 240–950)
VENTRICULAR RATE- MUSE: 63 BPM

## 2022-08-22 ENCOUNTER — INFUSION THERAPY VISIT (OUTPATIENT)
Dept: ONCOLOGY | Facility: CLINIC | Age: 62
End: 2022-08-22
Attending: PHYSICIAN ASSISTANT
Payer: COMMERCIAL

## 2022-08-22 ENCOUNTER — APPOINTMENT (OUTPATIENT)
Dept: LAB | Facility: CLINIC | Age: 62
End: 2022-08-22
Attending: PHYSICIAN ASSISTANT
Payer: COMMERCIAL

## 2022-08-22 VITALS
OXYGEN SATURATION: 96 % | TEMPERATURE: 98.3 F | HEART RATE: 57 BPM | WEIGHT: 235.3 LBS | BODY MASS INDEX: 32.56 KG/M2 | SYSTOLIC BLOOD PRESSURE: 127 MMHG | DIASTOLIC BLOOD PRESSURE: 82 MMHG | RESPIRATION RATE: 16 BRPM

## 2022-08-22 DIAGNOSIS — C61 MALIGNANT NEOPLASM OF PROSTATE METASTATIC TO BONE (H): Primary | ICD-10-CM

## 2022-08-22 DIAGNOSIS — C79.51 MALIGNANT NEOPLASM OF PROSTATE METASTATIC TO BONE (H): Primary | ICD-10-CM

## 2022-08-22 LAB
ALBUMIN SERPL-MCNC: 3.6 G/DL (ref 3.4–5)
ALP SERPL-CCNC: 66 U/L (ref 40–150)
ALT SERPL W P-5'-P-CCNC: 25 U/L (ref 0–70)
ANION GAP SERPL CALCULATED.3IONS-SCNC: 8 MMOL/L (ref 3–14)
AST SERPL W P-5'-P-CCNC: 17 U/L (ref 0–45)
BASOPHILS # BLD AUTO: 0 10E3/UL (ref 0–0.2)
BASOPHILS NFR BLD AUTO: 1 %
BILIRUB SERPL-MCNC: 0.7 MG/DL (ref 0.2–1.3)
BUN SERPL-MCNC: 15 MG/DL (ref 7–30)
CALCIUM SERPL-MCNC: 8.5 MG/DL (ref 8.5–10.1)
CHLORIDE BLD-SCNC: 108 MMOL/L (ref 94–109)
CO2 SERPL-SCNC: 26 MMOL/L (ref 20–32)
CREAT SERPL-MCNC: 0.87 MG/DL (ref 0.66–1.25)
EOSINOPHIL # BLD AUTO: 0 10E3/UL (ref 0–0.7)
EOSINOPHIL NFR BLD AUTO: 0 %
ERYTHROCYTE [DISTWIDTH] IN BLOOD BY AUTOMATED COUNT: 14.4 % (ref 10–15)
GFR SERPL CREATININE-BSD FRML MDRD: >90 ML/MIN/1.73M2
GLUCOSE BLD-MCNC: 81 MG/DL (ref 70–99)
HCT VFR BLD AUTO: 37.6 % (ref 40–53)
HGB BLD-MCNC: 12.2 G/DL (ref 13.3–17.7)
IMM GRANULOCYTES # BLD: 0 10E3/UL
IMM GRANULOCYTES NFR BLD: 1 %
LYMPHOCYTES # BLD AUTO: 1.4 10E3/UL (ref 0.8–5.3)
LYMPHOCYTES NFR BLD AUTO: 24 %
MCH RBC QN AUTO: 29.8 PG (ref 26.5–33)
MCHC RBC AUTO-ENTMCNC: 32.4 G/DL (ref 31.5–36.5)
MCV RBC AUTO: 92 FL (ref 78–100)
MONOCYTES # BLD AUTO: 0.6 10E3/UL (ref 0–1.3)
MONOCYTES NFR BLD AUTO: 10 %
NEUTROPHILS # BLD AUTO: 3.8 10E3/UL (ref 1.6–8.3)
NEUTROPHILS NFR BLD AUTO: 64 %
NRBC # BLD AUTO: 0 10E3/UL
NRBC BLD AUTO-RTO: 0 /100
PLATELET # BLD AUTO: 211 10E3/UL (ref 150–450)
POTASSIUM BLD-SCNC: 3.9 MMOL/L (ref 3.4–5.3)
PROT SERPL-MCNC: 6.8 G/DL (ref 6.8–8.8)
PSA SERPL-MCNC: 1.36 UG/L (ref 0–4)
RBC # BLD AUTO: 4.1 10E6/UL (ref 4.4–5.9)
SODIUM SERPL-SCNC: 142 MMOL/L (ref 133–144)
WBC # BLD AUTO: 5.9 10E3/UL (ref 4–11)

## 2022-08-22 PROCEDURE — 36415 COLL VENOUS BLD VENIPUNCTURE: CPT

## 2022-08-22 PROCEDURE — 85025 COMPLETE CBC W/AUTO DIFF WBC: CPT

## 2022-08-22 PROCEDURE — 99215 OFFICE O/P EST HI 40 MIN: CPT | Performed by: NURSE PRACTITIONER

## 2022-08-22 PROCEDURE — 84403 ASSAY OF TOTAL TESTOSTERONE: CPT

## 2022-08-22 PROCEDURE — 250N000011 HC RX IP 250 OP 636: Performed by: NURSE PRACTITIONER

## 2022-08-22 PROCEDURE — 84153 ASSAY OF PSA TOTAL: CPT

## 2022-08-22 PROCEDURE — 82374 ASSAY BLOOD CARBON DIOXIDE: CPT

## 2022-08-22 PROCEDURE — 258N000003 HC RX IP 258 OP 636: Performed by: NURSE PRACTITIONER

## 2022-08-22 PROCEDURE — 96413 CHEMO IV INFUSION 1 HR: CPT

## 2022-08-22 PROCEDURE — 96375 TX/PRO/DX INJ NEW DRUG ADDON: CPT

## 2022-08-22 PROCEDURE — G0463 HOSPITAL OUTPT CLINIC VISIT: HCPCS

## 2022-08-22 RX ORDER — HEPARIN SODIUM (PORCINE) LOCK FLUSH IV SOLN 100 UNIT/ML 100 UNIT/ML
5 SOLUTION INTRAVENOUS
Status: CANCELLED | OUTPATIENT
Start: 2022-08-22

## 2022-08-22 RX ORDER — DIPHENHYDRAMINE HYDROCHLORIDE 50 MG/ML
50 INJECTION INTRAMUSCULAR; INTRAVENOUS
Status: CANCELLED
Start: 2022-08-22

## 2022-08-22 RX ORDER — LORAZEPAM 2 MG/ML
0.5 INJECTION INTRAMUSCULAR EVERY 4 HOURS PRN
Status: CANCELLED | OUTPATIENT
Start: 2022-08-22

## 2022-08-22 RX ORDER — HEPARIN SODIUM,PORCINE 10 UNIT/ML
5 VIAL (ML) INTRAVENOUS
Status: CANCELLED | OUTPATIENT
Start: 2022-08-22

## 2022-08-22 RX ORDER — FLUCONAZOLE 200 MG/1
200 TABLET ORAL DAILY
Qty: 60 TABLET | Refills: 1 | Status: SHIPPED | OUTPATIENT
Start: 2022-08-22 | End: 2023-09-08

## 2022-08-22 RX ORDER — EPINEPHRINE 1 MG/ML
0.3 INJECTION, SOLUTION INTRAMUSCULAR; SUBCUTANEOUS EVERY 5 MIN PRN
Status: CANCELLED | OUTPATIENT
Start: 2022-08-22

## 2022-08-22 RX ORDER — PREDNISONE 5 MG/1
5 TABLET ORAL 2 TIMES DAILY
Qty: 42 TABLET | Refills: 0 | Status: SHIPPED | OUTPATIENT
Start: 2022-08-22 | End: 2022-09-12

## 2022-08-22 RX ORDER — HEPARIN SODIUM (PORCINE) LOCK FLUSH IV SOLN 100 UNIT/ML 100 UNIT/ML
5 SOLUTION INTRAVENOUS ONCE
Status: COMPLETED | OUTPATIENT
Start: 2022-08-22 | End: 2022-08-22

## 2022-08-22 RX ORDER — NALOXONE HYDROCHLORIDE 0.4 MG/ML
0.2 INJECTION, SOLUTION INTRAMUSCULAR; INTRAVENOUS; SUBCUTANEOUS
Status: CANCELLED | OUTPATIENT
Start: 2022-08-22

## 2022-08-22 RX ORDER — ALBUTEROL SULFATE 90 UG/1
1-2 AEROSOL, METERED RESPIRATORY (INHALATION)
Status: CANCELLED
Start: 2022-08-22

## 2022-08-22 RX ORDER — MEPERIDINE HYDROCHLORIDE 25 MG/ML
25 INJECTION INTRAMUSCULAR; INTRAVENOUS; SUBCUTANEOUS EVERY 30 MIN PRN
Status: CANCELLED | OUTPATIENT
Start: 2022-08-22

## 2022-08-22 RX ORDER — HEPARIN SODIUM (PORCINE) LOCK FLUSH IV SOLN 100 UNIT/ML 100 UNIT/ML
5 SOLUTION INTRAVENOUS
Status: DISCONTINUED | OUTPATIENT
Start: 2022-08-22 | End: 2022-08-22 | Stop reason: HOSPADM

## 2022-08-22 RX ORDER — METHYLPREDNISOLONE SODIUM SUCCINATE 125 MG/2ML
125 INJECTION, POWDER, LYOPHILIZED, FOR SOLUTION INTRAMUSCULAR; INTRAVENOUS
Status: CANCELLED
Start: 2022-08-22

## 2022-08-22 RX ORDER — ALBUTEROL SULFATE 0.83 MG/ML
2.5 SOLUTION RESPIRATORY (INHALATION)
Status: CANCELLED | OUTPATIENT
Start: 2022-08-22

## 2022-08-22 RX ADMIN — DOCETAXEL 172 MG: 20 INJECTION, SOLUTION, CONCENTRATE INTRAVENOUS at 12:18

## 2022-08-22 RX ADMIN — Medication 5 ML: at 10:30

## 2022-08-22 RX ADMIN — SODIUM CHLORIDE 250 ML: 9 INJECTION, SOLUTION INTRAVENOUS at 11:43

## 2022-08-22 RX ADMIN — DIPHENHYDRAMINE HYDROCHLORIDE 50 MG: 50 INJECTION, SOLUTION INTRAMUSCULAR; INTRAVENOUS at 11:43

## 2022-08-22 RX ADMIN — DEXAMETHASONE SODIUM PHOSPHATE: 10 INJECTION, SOLUTION INTRAMUSCULAR; INTRAVENOUS at 11:58

## 2022-08-22 ASSESSMENT — PAIN SCALES - GENERAL: PAINLEVEL: NO PAIN (0)

## 2022-08-22 NOTE — PATIENT INSTRUCTIONS
Contact Numbers    [facility] [place]: [phone]    Call with chills and/or temperature greater than or equal to 100.5, uncontrolled nausea/vomiting, diarrhea, constipation, dizziness, shortness of breath, chest pain, bleeding, unexplained bruising, or any new/concerning symptoms, questions/concerns.     If you are having any concerning symptoms or wish to speak to a provider before your next infusion visit, please call your care coordinator or triage to notify them so we can adequately serve you.       After Hours: [phone]    If after hours, weekends, or holidays, call main hospital  and ask for Oncology doctor on call.            Lab Results:  Recent Results (from the past 12 hour(s))   PSA tumor marker    Collection Time: 08/22/22 10:36 AM   Result Value Ref Range    PSA Tumor Marker 1.36 0.00 - 4.00 ug/L   Comprehensive metabolic panel    Collection Time: 08/22/22 10:36 AM   Result Value Ref Range    Sodium 142 133 - 144 mmol/L    Potassium 3.9 3.4 - 5.3 mmol/L    Chloride 108 94 - 109 mmol/L    Carbon Dioxide (CO2) 26 20 - 32 mmol/L    Anion Gap 8 3 - 14 mmol/L    Urea Nitrogen 15 7 - 30 mg/dL    Creatinine 0.87 0.66 - 1.25 mg/dL    Calcium 8.5 8.5 - 10.1 mg/dL    Glucose 81 70 - 99 mg/dL    Alkaline Phosphatase 66 40 - 150 U/L    AST 17 0 - 45 U/L    ALT 25 0 - 70 U/L    Protein Total 6.8 6.8 - 8.8 g/dL    Albumin 3.6 3.4 - 5.0 g/dL    Bilirubin Total 0.7 0.2 - 1.3 mg/dL    GFR Estimate >90 >60 mL/min/1.73m2   CBC with platelets and differential    Collection Time: 08/22/22 10:36 AM   Result Value Ref Range    WBC Count 5.9 4.0 - 11.0 10e3/uL    RBC Count 4.10 (L) 4.40 - 5.90 10e6/uL    Hemoglobin 12.2 (L) 13.3 - 17.7 g/dL    Hematocrit 37.6 (L) 40.0 - 53.0 %    MCV 92 78 - 100 fL    MCH 29.8 26.5 - 33.0 pg    MCHC 32.4 31.5 - 36.5 g/dL    RDW 14.4 10.0 - 15.0 %    Platelet Count 211 150 - 450 10e3/uL    % Neutrophils 64 %    % Lymphocytes 24 %    % Monocytes 10 %    % Eosinophils 0 %    %  Basophils 1 %    % Immature Granulocytes 1 %    NRBCs per 100 WBC 0 <1 /100    Absolute Neutrophils 3.8 1.6 - 8.3 10e3/uL    Absolute Lymphocytes 1.4 0.8 - 5.3 10e3/uL    Absolute Monocytes 0.6 0.0 - 1.3 10e3/uL    Absolute Eosinophils 0.0 0.0 - 0.7 10e3/uL    Absolute Basophils 0.0 0.0 - 0.2 10e3/uL    Absolute Immature Granulocytes 0.0 <=0.4 10e3/uL    Absolute NRBCs 0.0 10e3/uL

## 2022-08-22 NOTE — NURSING NOTE
"Chief Complaint   Patient presents with     Port Draw     Labs drawn via port by RN. Vitals taken.     Labs drawn via port by RN. Port accessed with 20G 3/4\" gripper needle. Flushed with NS and heparin. Pt tolerated well. Vitals taken. Pt checked in for next appointment.    Rosina Mcfadden RN    "

## 2022-08-22 NOTE — NURSING NOTE
"Oncology Rooming Note    August 22, 2022 10:49 AM   Walter Reyes is a 62 year old male who presents for:    Chief Complaint   Patient presents with     Port Draw     Labs drawn via port by RN. Vitals taken.     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    Weight as of this encounter: 106.7 kg (235 lb 4.8 oz). Body surface area is 2.32 meters squared.  No Pain (0) Comment: Data Unavailable   No LMP for male patient.  Allergies reviewed: Yes  Medications reviewed: Yes    Medications: MEDICATION REFILLS NEEDED TODAY. Provider was notified.     Pt needs predisone if he is going to continue taking.     Pharmacy name entered into EPIC:    [facility] - [place], [place] - [address] [facility] [id number] [place], [place] - [address] [address] [facility] - [place], [place] - [address] [place] [address] PHARMACY [place], [place] - [phone]    Clinical concerns:    Pt is wondering if he can get the anti-fungal mouthwash before he starts infusion to avoid getting thrust.     Ok Uribe            "

## 2022-08-22 NOTE — LETTER
"    8/22/2022         RE: Walter Reyes  [address] [facility] [place] [id number]        Dear Colleague,    Thank you for referring your patient, Walter Reyes, to the [facility]. Please see a copy of my visit note below.      [facility] Oncology Followup  Oncologist: Dr. Jj Winters  Aug 22, 2022           REASON FOR VISIT: Follow-up for metastatic castration-resistant prostate cancer, here for Cycle 1 Docetaxel    HISTORY OF PRESENT ILLNESS: Mr. 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 His oncologic history is detailed below.     1/6/21  PSA = 0.29  3/31/21  PSA = 0.44  7/7/21  PSA = 0.47  11/2021  PSA = 1.05  -- Start XTANDI  12/16/21 PSA=0.22  2/11/22  PSA= 0.50  3/22/22  PSA=0.72  5/5/2022 PSA=1.79  7/11/2022 PSA=2.16 --Start cycle 1 docetaxel     Interval History:.     ONCOLOGIC HISTORY:  1. 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 Naylor at [facility]. Prior PSA were 1.4 on 8/16/17, 2.4 on 6/28/16, 2.9 on 6/28/16, and as low as 0.4 on 3/26/2003.   - 1/08/2019: Consultation with Sarah Wilson CNP in Urology clinic. Repeat PSA 9.6.  - 1/16/2019: MRI prostate with contrast - \"This examination is characterized as PIRADS 5- very high probability. Clinically significant cancer is highly likely to be present. There is a large, invasive mass arising from the right peripheral zone and extending into the neurovascular bundle, seminal vesicle, and along the anterolateral right mesorectal fascia. Metastatic right external iliac lymph node. Metastatic lesion in the posterior/superior right acetabulum.\"  - 1/25/2019: CT abdomen and " "pelvis with IV contrast - \"1. Heterogeneous enhancing mass posteriorly in the upper pole of the right kidney measures 4.5 x 5.8 x 5.7 cm (AP by transverse by craniocaudal). It has a small nodular component extending posterior medially which abuts the right psoas muscle. This nodular extension measures 2.1 x 2.1 cm. Minimal stranding about the mass. No definite thrombus within the right renal vein. This renal mass is compatible with a renal cell carcinoma. A paraaortic lymph node situated immediately posterior to the left renal vein measures 1.1 cm in short axis, suspicious for metastasis. 2. A 2 cm right external iliac lymph node is also suspicious for metastases. 3. Multiple sclerotic osseous lesions suspicious for metastases. These include 0.8 and 0.6 cm sclerotic lesions laterally in the right iliac wing (images 53 and 62 respectively). Ill-defined groundglass density laterally in the right acetabulum measuring 1.7 x 1.2 cm corresponds with the lesion identified on MRI. A 0.4 cm groundglass density in the left acetabulum. Sclerotic lesion in the left femoral neck measures 0.9 x 1.6 cm (image 81). Sclerotic metastases would be more compatible with prostate metastases. 4. A 3 subcentimeter hepatic lesions are indeterminate. Metastases would be a consideration. These can be further characterized with liver MRI.\"  - 1/25/2019: NM bone scan - \"There is focal bony uptake in the left femoral neck, right acetabulum, right of midline at the S1 or L5 level of the spine, within multiple bilateral ribs, in the C7 or T1 level of the spine, and anteriorly within the skull. Findings are suspicious for metastases.\"  - 1/31/19: CT chest with contrast - \" No suspicious nodules in the chest.  Stable appearance of a 5.8 cm right renal mass concerning for renal carcinoma until proven otherwise.  Stable indeterminant subcentimeter hypodensities in the liver.  Multifocal osteoblastic metastasis including 2.5 cm lesion in the right " "fifth rib posteriorly and a 1.6 cm lesion in the right third rib posteriorly. No lytic lesions identified.\"  - 2/6/19: CT-guided right sclerotic fifth rib lesion biopsy - \"Metastatic carcinoma, consistent with prostate primary.  Immunohistochemical stains performed show the metastatic carcinoma stains positive for NKX3.1 (prostate marker), negative for STACIE 3 and PAX8, which supports the above diagnosis.\"  - 2/15/19: Started Casodex 50mg every day and consented for the biobanking protocol. 3/18/19 - stopped Casodex.  - 2/19/19: Case discussed in tumor board - recommendation for nephectomy for suspected malignant right renal mass.   - 2/26/19: Started Lupron 22.5mg every 3 months.   - 5/8/19: Started Docetaxel 75mg/m2 IV every 3 weeks. 06/18/19 - cycle 3, 7/10/19 - cycle 4, 07/31/19 - cycle 5, 08/21/19 - cycle 6.   - 10/2/19: Restaging CT CAP and NM Bone Scan showed improved osseous disease, no evidence of recurrent or new metastatic disease.  - 1/5/20: Restaging CT CAP and NM Bone Scan showed stable osseous disease, no evidence of recurrent or new metastatic disease.  - 4/6/20: NM bone scan with slightly improved uptake in known skeletal mets. CT C/A/P with contrast with stable osseous mets, no yusuf enlargement, no new visceral mets etc.  - 04/08/20: Zometa every 3 months.  - 10/5/20: CT C/A/P with contrast and NM bone scan - SD.   - 1/4/21: CT C/A/P with contrast and NM bone scan - SD but slight increase in right 5th rib tracer uptake and in posterior L5 sclerosis.   - 03/29/21: CT C/A/P with contrast - single new right sacral 8mm bone lesion (suspicious), other bone mets stable. No visceral/yusuf disease. Nephrectomy site without recurrence. NM bone scan - SD but \"increased uptake associated with the prior lesions of the lower  cervical spine, posterior right fourth rib and right L5 posterior arch elements. Otherwise unchanged uptake associated with the proximal left femur and left anterior fourth rib. Similar " "uptake of the left halux, possibly secondary to degenerative osteoarthritis or gout.\"  -  START XTANDI  -  RT to L5 / S1  -  STOPPED XTANDI   -07/11/2022  START Cycle 1 Docetaxel     2. Stage III ([id number]), grade 3 of 4, clear-cell RCC of right kidney:  - Incidentally diagnosed as above.   - Underwent curative-intent robotic right radical nephrectomy with Dr. Wesley Cleveland on 3/19/19. No tumor spillage per op report.   - Path showed: \"Histologic Type: Clear cell renal cell carcinoma; Sarcomatoid Features: Not identified; Histologic Grade: Nucleolar grade 3 (WHO/ISUP). Extent Tumor Size: 4.3 cm. Microscopic Tumor Extension: Tumor extension into renal sinus (in vascular structures). Margins: Negative. Tumor Necrosis: Present; focal. Lymph-Vascular Invasion: Not identified.  Pathologic Staging (pTNM) Primary Tumor (pT): pT3a: Tumor extends into renal vein branches/renal sinus. Regional Lymph Nodes (pN): pNX. Number of Lymph Nodes Examined: 0 Distant Metastasis (pM): pM N/A.\"  - Restaging scans as above.    INTERVAL HISTORY:   Walter presents for oncology follow-up visit today prior to Cycle 3 Docetaxel. Thus far he is tolerating well without acute toxicity. Notes some mouth sensitivity and recurrent thrush with steroid use. Mild neuropathy in toes. Has start of hair loss and scalp tenderness. His appetite is good. He denies new pain. Normal bowel function, notes loose muscle tone but no incontinence. No nausea/vomiting. His breathing is good. No CP. No fevers or chills. ROS is otherwise negative.       PAST MEDICAL HISTORY:  Past Medical History:   Diagnosis Date     Cancer of kidney, right (H)      Complication of anesthesia     slow wakeup      Malignant neoplasm of prostate metastatic to bone (H) 2/14/2019     Thyroid disease      PAST SURGICAL HISTORY:   Past Surgical History:   Procedure Laterality Date     CYSTOSCOPY      about 10 years ago     INSERT PORT VASCULAR ACCESS Right 5/2/2019    " Procedure: Chest Port Placement;  Surgeon: aTte Burciaga PA-C;  Location: [facility] OR     IR CHEST PORT PLACEMENT > 5 YRS OF AGE  5/2/2019     LAPAROSCOPIC NEPHRECTOMY Right 3/19/2019    Procedure: Right laparoscopic radical nephrectomy;  Surgeon: Wesley Cleveland MD;  Location: [facility] OR      SOCIAL HISTORY:   Social History     Tobacco Use     Smoking status: Never Smoker     Smokeless tobacco: Never Used   Substance Use Topics     Drug use: No     FAMILY HISTORY:   Family History   Problem Relation Age of Onset     Diabetes Father      ALLERGIES:   Allergies   Allergen Reactions     Doxycycline GI Disturbance     CURRENT MEDICATIONS:   Current Outpatient Medications:      amLODIPine (NORVASC) 10 MG tablet, Take 1 tablet (10 mg) by mouth daily, Disp: 30 tablet, Rfl: 0     atorvastatin (LIPITOR) 20 MG tablet, Take 60 mg by mouth daily , Disp: , Rfl:      calcium citrate-vitamin D (CITRACAL) 315-250 MG-UNIT TABS per tablet, Take 650 mg by mouth 2 times daily , Disp: , Rfl:      cycloSPORINE (RESTASIS) 0.05 % ophthalmic emulsion, Place 1 drop into both eyes 2 times daily , Disp: , Rfl:      Glucosamine-Chondroit-Vit C-Mn (GLUCOSAMINE 1500 COMPLEX) CAPS, Take 1 capsule by mouth daily, Disp: , Rfl:      levothyroxine (SYNTHROID/LEVOTHROID) 112 MCG tablet, Take 112 mcg by mouth daily, Disp: , Rfl:      loratadine (CLARITIN) 10 MG tablet, , Disp: , Rfl:      MAGNESIUM PO, Take 250 mg by mouth 2 times daily , Disp: , Rfl:      MEBENDAZOLE PO, Take 225 mg by mouth daily , Disp: , Rfl:      Multiple Vitamins-Minerals (MULTIVITAMIN ADULT EXTRA C PO), Take 1 tablet by mouth every 24 hours, Disp: , Rfl:      Nutritional Supplements (SALMON OIL) CAPS, Take 2 capsules by mouth daily , Disp: , Rfl:      nystatin (MYCOSTATIN) 413283 UNIT/ML suspension, Take 5 mLs (500,000 Units) by mouth 4 times daily, Disp: 200 mL, Rfl: 0     omeprazole (PRILOSEC) 20 MG DR capsule, Take 20 mg by mouth daily, Disp: , Rfl:      predniSONE  (DELTASONE) 5 MG tablet, Take 1 tablet (5 mg) by mouth 2 times daily for 21 days, Disp: 42 tablet, Rfl: 0     tamsulosin (FLOMAX) 0.4 MG capsule, Take 1 capsule (0.4 mg) by mouth daily, Disp: 30 capsule, Rfl: 3     dexamethasone (DECADRON) 4 MG tablet, Take 2 tablets (8 mg) by mouth 2 times daily (with meals) for 3 doses Start evening of Docetaxel infusion and continue for a total of 3 doses., Disp: 6 tablet, Rfl: 9     prochlorperazine (COMPAZINE) 10 MG tablet, Take 1 tablet (10 mg) by mouth every 6 hours as needed for nausea or vomiting (Patient not taking: No sig reported), Disp: 30 tablet, Rfl: 2     prochlorperazine (COMPAZINE) 10 MG tablet, Take 1 tablet (10 mg) by mouth every 6 hours as needed for nausea or vomiting (Patient not taking: No sig reported), Disp: 30 tablet, Rfl: 3  No current facility-administered medications for this visit.    Physical Exam:   /82 (BP Location: Left arm, Patient Position: Sitting, Cuff Size: Adult Large)   Pulse 57   Temp 98.3  F (36.8  C) (Oral)   Resp 16   Wt 106.7 kg (235 lb 4.8 oz)   SpO2 96%   BMI 32.56 kg/m     General: well appearing, no acute distress, pleasant male   HEENT: normocephalic, atraumatic, PERRLA, sclerae nonicteric.   CV: regular rate and rhythm, no murmurs  Lungs: clear to auscultation bilaterally, no wheezes/rales/rhonchi  Abd: soft, positive bowel sounds, non-distended, non-tender  MSK: full range of motion in all four extremities, no peripheral edema  Neuro: alert and oriented x3, CN grossly intact   Psych: appropriate mood and affect  Skin: no rashes or lesions    ECOG PS 1.    LABORATORY DATA:  Most Recent 3 CBC's:  Recent Labs   Lab Test 08/22/22  1036 08/01/22  1113 07/11/22  0922   WBC 5.9 6.1 4.2   HGB 12.2* 12.5* 13.2*   MCV 92 92 90    179 198   ANEUTAUTO 3.8 4.3 2.5     Most Recent 3 BMP's:  Recent Labs   Lab Test 08/22/22  1036 08/01/22  1113 07/11/22  0922    143 140   POTASSIUM 3.9 4.1 4.0   CHLORIDE 108 111* 108  "  CO2 26 24 24   BUN 15 15 12   CR 0.87 0.88 0.88   ANIONGAP 8 8 8   BETHANY 8.5 8.8 8.6   GLC 81 115* 103*   PROTTOTAL 6.8 7.0 7.3   ALBUMIN 3.6 3.7 3.8    Most Recent 3 LFT's:  Recent Labs   Lab Test 08/22/22  1036 08/01/22  1113 07/11/22  0922   AST 17 13 20   ALT 25 32 22   ALKPHOS 66 80 78   BILITOTAL 0.7 0.4 0.6    Most Recent 2 TSH and T4:  Recent Labs   Lab Test 07/07/21  0834 03/31/21  0824   TSH 1.67 1.63     I reviewed the above labs today.      ASSESSMENT & PLAN: Mr. Reyes is a delightful 62 year old gentleman with metastatic castration sensitive prostate adenocarcinoma as well as an incidentally diagnosed stage III clear-cell renal cell carcinoma of the right kidney (s/p radical R nephrectomy 3/19/19), who is here for a follow-up visit and initiation of docetaxel for now metastatic castration-resistant prostate cancer.     1. Metastatic castration-resistant prostate cancer, with involvement of the bones and RPLN:   - Patient met the criteria for CHAARTED \"high-volume\" metastatic hormone-sensitive prostate cancer. He opted for docetaxel in combination with ADT (per JOSEFA, [id number], KARLA). Tolerated 6 cycles of docetaxel fairly well (5/8/19 through 8/21/19), with the exception of mild fatigue and mild neuropathy.   - Continue leuprolide 22.5mg every 3 months, last dosed in May--give tomorrow  - S/P radiation to  L5/S1 (4/2022) with resolution of low back pain and left leg numbness.   - Obtain blood for CFDNA  - Rising PSA from 0.72 (3/22/22)-->1.79 (5/5/22). He had CT scan and NM Bone Scan done 5/5 which confirmed disease progression. Stopped Xtandi and began Docetaxel for metastatic castration resistant prostate cancer. Tolerated cycle 1 well, IV benadryl as premed without reaction so will keep. His PSA level is responding. pre-docetaxel PSA 2.16  -labs and assessment ok to proceed with cycle 3 today  -Plan for 4-6 cycles of chemotherapy depending on tolerance to treatment. Will request " additional two infusions per pt request.     2. Bone metastases:   - Noted to have osseous metastatic disease at the time of diagnosis. Last bone scan (5/5/22) showed new focus of increased uptake left superior acetabulum and distal left femoral diaphysis; slight increased uptake in the left first rib; stable increased uptake in the other metastatic foci.   - Encouraged to continue calcium and vitamin D supplementation.  - Will continue Zometa 4mg IV every ~3 months, last dosed on 8/2       3. Stage 3, UISS-high risk ccRCC: s/p R nephrectomy   - approximately 40-50% risk of recurrence after definitive surgery.   - no clear evidence of residual disease at this time; the retroperitoneal lymphadenopathy is more consistent with metastatic prostate cancer   - Continue active surveillance with self-reporting for signs/symptoms of recurrence (this was previously explained in detail) and periodic H&P and scans.    4. Thrush  Recurrent with prednisone use. fluconazole maintenance 200 mg daily     50 minutes spent on the date of the encounter doing chart review, review of test results, interpretation of tests, patient visit, documentation and discussion with other provider(s)     Veda Greenwood CNP on 8/22/2022 at 11:24 AM                   Again, thank you for allowing me to participate in the care of your patient.        Sincerely,        Veda Greenwood CNP

## 2022-08-22 NOTE — PROGRESS NOTES
"  [facility] Oncology Followup  Oncologist: Dr. Jj Winters  Aug 22, 2022           REASON FOR VISIT: Follow-up for metastatic castration-resistant prostate cancer, here for Cycle 1 Docetaxel    HISTORY OF PRESENT ILLNESS: Mr. 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 His oncologic history is detailed below.     1/6/21  PSA = 0.29  3/31/21  PSA = 0.44  7/7/21  PSA = 0.47  11/2021  PSA = 1.05  -- Start XTANDI  12/16/21 PSA=0.22  2/11/22  PSA= 0.50  3/22/22  PSA=0.72  5/5/2022 PSA=1.79  7/11/2022 PSA=2.16 --Start cycle 1 docetaxel     Interval History:.     ONCOLOGIC HISTORY:  1. 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 Naylor at [facility]. Prior PSA were 1.4 on 8/16/17, 2.4 on 6/28/16, 2.9 on 6/28/16, and as low as 0.4 on 3/26/2003.   - 1/08/2019: Consultation with Sarah Wilson CNP in Urology clinic. Repeat PSA 9.6.  - 1/16/2019: MRI prostate with contrast - \"This examination is characterized as PIRADS 5- very high probability. Clinically significant cancer is highly likely to be present. There is a large, invasive mass arising from the right peripheral zone and extending into the neurovascular bundle, seminal vesicle, and along the anterolateral right mesorectal fascia. Metastatic right external iliac lymph node. Metastatic lesion in the posterior/superior right acetabulum.\"  - 1/25/2019: CT abdomen and pelvis with IV contrast - \"1. Heterogeneous enhancing mass posteriorly in the upper pole of the right kidney measures 4.5 x 5.8 x 5.7 cm (AP by transverse by craniocaudal). It has a small nodular component extending posterior medially which abuts the right psoas muscle. This " "nodular extension measures 2.1 x 2.1 cm. Minimal stranding about the mass. No definite thrombus within the right renal vein. This renal mass is compatible with a renal cell carcinoma. A paraaortic lymph node situated immediately posterior to the left renal vein measures 1.1 cm in short axis, suspicious for metastasis. 2. A 2 cm right external iliac lymph node is also suspicious for metastases. 3. Multiple sclerotic osseous lesions suspicious for metastases. These include 0.8 and 0.6 cm sclerotic lesions laterally in the right iliac wing (images 53 and 62 respectively). Ill-defined groundglass density laterally in the right acetabulum measuring 1.7 x 1.2 cm corresponds with the lesion identified on MRI. A 0.4 cm groundglass density in the left acetabulum. Sclerotic lesion in the left femoral neck measures 0.9 x 1.6 cm (image 81). Sclerotic metastases would be more compatible with prostate metastases. 4. A 3 subcentimeter hepatic lesions are indeterminate. Metastases would be a consideration. These can be further characterized with liver MRI.\"  - 1/25/2019: NM bone scan - \"There is focal bony uptake in the left femoral neck, right acetabulum, right of midline at the S1 or L5 level of the spine, within multiple bilateral ribs, in the C7 or T1 level of the spine, and anteriorly within the skull. Findings are suspicious for metastases.\"  - 1/31/19: CT chest with contrast - \" No suspicious nodules in the chest.  Stable appearance of a 5.8 cm right renal mass concerning for renal carcinoma until proven otherwise.  Stable indeterminant subcentimeter hypodensities in the liver.  Multifocal osteoblastic metastasis including 2.5 cm lesion in the right fifth rib posteriorly and a 1.6 cm lesion in the right third rib posteriorly. No lytic lesions identified.\"  - 2/6/19: CT-guided right sclerotic fifth rib lesion biopsy - \"Metastatic carcinoma, consistent with prostate primary.  Immunohistochemical stains performed show the " "metastatic carcinoma stains positive for NKX3.1 (prostate marker), negative for STACIE 3 and PAX8, which supports the above diagnosis.\"  - 2/15/19: Started Casodex 50mg every day and consented for the biobanking protocol. 3/18/19 - stopped Casodex.  - 2/19/19: Case discussed in tumor board - recommendation for nephectomy for suspected malignant right renal mass.   - 2/26/19: Started Lupron 22.5mg every 3 months.   - 5/8/19: Started Docetaxel 75mg/m2 IV every 3 weeks. 06/18/19 - cycle 3, 7/10/19 - cycle 4, 07/31/19 - cycle 5, 08/21/19 - cycle 6.   - 10/2/19: Restaging CT CAP and NM Bone Scan showed improved osseous disease, no evidence of recurrent or new metastatic disease.  - 1/5/20: Restaging CT CAP and NM Bone Scan showed stable osseous disease, no evidence of recurrent or new metastatic disease.  - 4/6/20: NM bone scan with slightly improved uptake in known skeletal mets. CT C/A/P with contrast with stable osseous mets, no yusuf enlargement, no new visceral mets etc.  - 04/08/20: Zometa every 3 months.  - 10/5/20: CT C/A/P with contrast and NM bone scan - SD.   - 1/4/21: CT C/A/P with contrast and NM bone scan - SD but slight increase in right 5th rib tracer uptake and in posterior L5 sclerosis.   - 03/29/21: CT C/A/P with contrast - single new right sacral 8mm bone lesion (suspicious), other bone mets stable. No visceral/yusuf disease. Nephrectomy site without recurrence. NM bone scan - SD but \"increased uptake associated with the prior lesions of the lower  cervical spine, posterior right fourth rib and right L5 posterior arch elements. Otherwise unchanged uptake associated with the proximal left femur and left anterior fourth rib. Similar uptake of the left halux, possibly secondary to degenerative osteoarthritis or gout.\"  -  START XTANDI  -  RT to L5 / S1  -  STOPPED XTANDI   -07/11/2022  START Cycle 1 Docetaxel     2. Stage III ([id number]), grade 3 of 4, clear-cell RCC of right " "kidney:  - Incidentally diagnosed as above.   - Underwent curative-intent robotic right radical nephrectomy with Dr. Wesley Cleveland on 3/19/19. No tumor spillage per op report.   - Path showed: \"Histologic Type: Clear cell renal cell carcinoma; Sarcomatoid Features: Not identified; Histologic Grade: Nucleolar grade 3 (WHO/ISUP). Extent Tumor Size: 4.3 cm. Microscopic Tumor Extension: Tumor extension into renal sinus (in vascular structures). Margins: Negative. Tumor Necrosis: Present; focal. Lymph-Vascular Invasion: Not identified.  Pathologic Staging (pTNM) Primary Tumor (pT): pT3a: Tumor extends into renal vein branches/renal sinus. Regional Lymph Nodes (pN): pNX. Number of Lymph Nodes Examined: 0 Distant Metastasis (pM): pM N/A.\"  - Restaging scans as above.    INTERVAL HISTORY:   Walter presents for oncology follow-up visit today prior to Cycle 3 Docetaxel. Thus far he is tolerating well without acute toxicity. Notes some mouth sensitivity and recurrent thrush with steroid use. Mild neuropathy in toes. Has start of hair loss and scalp tenderness. His appetite is good. He denies new pain. Normal bowel function, notes loose muscle tone but no incontinence. No nausea/vomiting. His breathing is good. No CP. No fevers or chills. ROS is otherwise negative.       PAST MEDICAL HISTORY:  Past Medical History:   Diagnosis Date     Cancer of kidney, right (H)      Complication of anesthesia     slow wakeup      Malignant neoplasm of prostate metastatic to bone (H) 2/14/2019     Thyroid disease      PAST SURGICAL HISTORY:   Past Surgical History:   Procedure Laterality Date     CYSTOSCOPY      about 10 years ago     INSERT PORT VASCULAR ACCESS Right 5/2/2019    Procedure: Chest Port Placement;  Surgeon: Tate Burciaga PA-C;  Location: [facility] OR     IR CHEST PORT PLACEMENT > 5 YRS OF AGE  5/2/2019     LAPAROSCOPIC NEPHRECTOMY Right 3/19/2019    Procedure: Right laparoscopic radical nephrectomy;  Surgeon: Wesley Cleveland " Kai Rascon MD;  Location:  OR      SOCIAL HISTORY:   Social History     Tobacco Use     Smoking status: Never Smoker     Smokeless tobacco: Never Used   Substance Use Topics     Drug use: No     FAMILY HISTORY:   Family History   Problem Relation Age of Onset     Diabetes Father      ALLERGIES:   Allergies   Allergen Reactions     Doxycycline GI Disturbance     CURRENT MEDICATIONS:   Current Outpatient Medications:      amLODIPine (NORVASC) 10 MG tablet, Take 1 tablet (10 mg) by mouth daily, Disp: 30 tablet, Rfl: 0     atorvastatin (LIPITOR) 20 MG tablet, Take 60 mg by mouth daily , Disp: , Rfl:      calcium citrate-vitamin D (CITRACAL) 315-250 MG-UNIT TABS per tablet, Take 650 mg by mouth 2 times daily , Disp: , Rfl:      cycloSPORINE (RESTASIS) 0.05 % ophthalmic emulsion, Place 1 drop into both eyes 2 times daily , Disp: , Rfl:      Glucosamine-Chondroit-Vit C-Mn (GLUCOSAMINE 1500 COMPLEX) CAPS, Take 1 capsule by mouth daily, Disp: , Rfl:      levothyroxine (SYNTHROID/LEVOTHROID) 112 MCG tablet, Take 112 mcg by mouth daily, Disp: , Rfl:      loratadine (CLARITIN) 10 MG tablet, , Disp: , Rfl:      MAGNESIUM PO, Take 250 mg by mouth 2 times daily , Disp: , Rfl:      MEBENDAZOLE PO, Take 225 mg by mouth daily , Disp: , Rfl:      Multiple Vitamins-Minerals (MULTIVITAMIN ADULT EXTRA C PO), Take 1 tablet by mouth every 24 hours, Disp: , Rfl:      Nutritional Supplements (SALMON OIL) CAPS, Take 2 capsules by mouth daily , Disp: , Rfl:      nystatin (MYCOSTATIN) 723129 UNIT/ML suspension, Take 5 mLs (500,000 Units) by mouth 4 times daily, Disp: 200 mL, Rfl: 0     omeprazole (PRILOSEC) 20 MG DR capsule, Take 20 mg by mouth daily, Disp: , Rfl:      predniSONE (DELTASONE) 5 MG tablet, Take 1 tablet (5 mg) by mouth 2 times daily for 21 days, Disp: 42 tablet, Rfl: 0     tamsulosin (FLOMAX) 0.4 MG capsule, Take 1 capsule (0.4 mg) by mouth daily, Disp: 30 capsule, Rfl: 3     dexamethasone (DECADRON) 4 MG tablet, Take 2  tablets (8 mg) by mouth 2 times daily (with meals) for 3 doses Start evening of Docetaxel infusion and continue for a total of 3 doses., Disp: 6 tablet, Rfl: 9     prochlorperazine (COMPAZINE) 10 MG tablet, Take 1 tablet (10 mg) by mouth every 6 hours as needed for nausea or vomiting (Patient not taking: No sig reported), Disp: 30 tablet, Rfl: 2     prochlorperazine (COMPAZINE) 10 MG tablet, Take 1 tablet (10 mg) by mouth every 6 hours as needed for nausea or vomiting (Patient not taking: No sig reported), Disp: 30 tablet, Rfl: 3  No current facility-administered medications for this visit.    Physical Exam:   /82 (BP Location: Left arm, Patient Position: Sitting, Cuff Size: Adult Large)   Pulse 57   Temp 98.3  F (36.8  C) (Oral)   Resp 16   Wt 106.7 kg (235 lb 4.8 oz)   SpO2 96%   BMI 32.56 kg/m     General: well appearing, no acute distress, pleasant male   HEENT: normocephalic, atraumatic, PERRLA, sclerae nonicteric.   CV: regular rate and rhythm, no murmurs  Lungs: clear to auscultation bilaterally, no wheezes/rales/rhonchi  Abd: soft, positive bowel sounds, non-distended, non-tender  MSK: full range of motion in all four extremities, no peripheral edema  Neuro: alert and oriented x3, CN grossly intact   Psych: appropriate mood and affect  Skin: no rashes or lesions    ECOG PS 1.    LABORATORY DATA:  Most Recent 3 CBC's:  Recent Labs   Lab Test 08/22/22  1036 08/01/22  1113 07/11/22  0922   WBC 5.9 6.1 4.2   HGB 12.2* 12.5* 13.2*   MCV 92 92 90    179 198   ANEUTAUTO 3.8 4.3 2.5     Most Recent 3 BMP's:  Recent Labs   Lab Test 08/22/22  1036 08/01/22  1113 07/11/22  0922    143 140   POTASSIUM 3.9 4.1 4.0   CHLORIDE 108 111* 108   CO2 26 24 24   BUN 15 15 12   CR 0.87 0.88 0.88   ANIONGAP 8 8 8   BETHANY 8.5 8.8 8.6   GLC 81 115* 103*   PROTTOTAL 6.8 7.0 7.3   ALBUMIN 3.6 3.7 3.8    Most Recent 3 LFT's:  Recent Labs   Lab Test 08/22/22  1036 08/01/22  1113 07/11/22  0922   AST 17 13 20   ALT  "25 32 22   ALKPHOS 66 80 78   BILITOTAL 0.7 0.4 0.6    Most Recent 2 TSH and T4:  Recent Labs   Lab Test 07/07/21  0834 03/31/21  0824   TSH 1.67 1.63     I reviewed the above labs today.      ASSESSMENT & PLAN: Mr. Reyes is a delightful 62 year old gentleman with metastatic castration sensitive prostate adenocarcinoma as well as an incidentally diagnosed stage III clear-cell renal cell carcinoma of the right kidney (s/p radical R nephrectomy 3/19/19), who is here for a follow-up visit and initiation of docetaxel for now metastatic castration-resistant prostate cancer.     1. Metastatic castration-resistant prostate cancer, with involvement of the bones and RPLN:   - Patient met the criteria for CHAARTED \"high-volume\" metastatic hormone-sensitive prostate cancer. He opted for docetaxel in combination with ADT (per JOSEFA, [id number], KARLA). Tolerated 6 cycles of docetaxel fairly well (5/8/19 through 8/21/19), with the exception of mild fatigue and mild neuropathy.   - Continue leuprolide 22.5mg every 3 months, last dosed in May--give tomorrow  - S/P radiation to  L5/S1 (4/2022) with resolution of low back pain and left leg numbness.   - Obtain blood for CFDNA  - Rising PSA from 0.72 (3/22/22)-->1.79 (5/5/22). He had CT scan and NM Bone Scan done 5/5 which confirmed disease progression. Stopped Xtandi and began Docetaxel for metastatic castration resistant prostate cancer. Tolerated cycle 1 well, IV benadryl as premed without reaction so will keep. His PSA level is responding. pre-docetaxel PSA 2.16  -labs and assessment ok to proceed with cycle 3 today  -Plan for 4-6 cycles of chemotherapy depending on tolerance to treatment. Will request additional two infusions per pt request.     2. Bone metastases:   - Noted to have osseous metastatic disease at the time of diagnosis. Last bone scan (5/5/22) showed new focus of increased uptake left superior acetabulum and distal left femoral diaphysis; slight " increased uptake in the left first rib; stable increased uptake in the other metastatic foci.   - Encouraged to continue calcium and vitamin D supplementation.  - Will continue Zometa 4mg IV every ~3 months, last dosed on 8/2       3. Stage 3, UISS-high risk ccRCC: s/p R nephrectomy   - approximately 40-50% risk of recurrence after definitive surgery.   - no clear evidence of residual disease at this time; the retroperitoneal lymphadenopathy is more consistent with metastatic prostate cancer   - Continue active surveillance with self-reporting for signs/symptoms of recurrence (this was previously explained in detail) and periodic H&P and scans.    4. Thrush  Recurrent with prednisone use. fluconazole maintenance 200 mg daily     50 minutes spent on the date of the encounter doing chart review, review of test results, interpretation of tests, patient visit, documentation and discussion with other provider(s)     Veda Greenwood CNP on 8/22/2022 at 11:24 AM

## 2022-08-22 NOTE — PROGRESS NOTES
Infusion Nursing Note:  Walter Reyes presents today for Cycle 3, day 1 Taxotere.    Patient seen by provider today: Yes, KADEN Ferrari    Note: Patient presents to clinic today feeling well with no questions.  Pt did not request or require any intervention for pain today.  Pt stated understanding of how to take dex then predisone in two days.     Intravenous Access:  Implanted Port.    Treatment Conditions:  Lab Results   Component Value Date    HGB 12.2 (L) 08/22/2022    WBC 5.9 08/22/2022    ANEU 5.0 07/07/2021    ANEUTAUTO 3.8 08/22/2022     08/22/2022      Lab Results   Component Value Date     08/22/2022    POTASSIUM 3.9 08/22/2022    CR 0.87 08/22/2022    BETHANY 8.5 08/22/2022    BILITOTAL 0.7 08/22/2022    ALBUMIN 3.6 08/22/2022    ALT 25 08/22/2022    AST 17 08/22/2022     Results reviewed, labs MET treatment parameters, ok to proceed with treatment.    Post Infusion Assessment:  Patient tolerated infusion without incident.  Blood return noted pre and post infusion.  Site patent and intact, free from redness, edema or discomfort.  No evidence of extravasations.  Access discontinued per protocol.    Discharge Plan:   Prescription refills given for Dexamethasone, prednisone and diflucan.  Discharge instructions reviewed with: Patient.  Patient and/or family verbalized understanding of discharge instructions and all questions answered.  Copy of AVS reviewed with patient and/or family.  Patient will return 9/12/2022 for next appointment.  Patient discharged in stable condition accompanied by: self.  Departure Mode: Ambulatory.    Marietta Morales RN

## 2022-08-25 ENCOUNTER — TELEPHONE (OUTPATIENT)
Dept: ONCOLOGY | Facility: CLINIC | Age: 62
End: 2022-08-25

## 2022-08-25 DIAGNOSIS — B37.0 THRUSH: ICD-10-CM

## 2022-08-25 RX ORDER — NYSTATIN 100000/ML
500000 SUSPENSION, ORAL (FINAL DOSE FORM) ORAL 4 TIMES DAILY
Qty: 200 ML | Refills: 1 | Status: SHIPPED | OUTPATIENT
Start: 2022-08-25 | End: 2022-10-03

## 2022-08-25 NOTE — TELEPHONE ENCOUNTER
Situation: [facility] Telephone Triage Note  Walter (pt) reporting the following symptoms: Oral Thrush/Mucositis     Background:  Last clinic visit date: 8/22/22.     D1C3 Taxotere on 8/22/22.     Assessment:     Onset: 8/24/22  Duration/Frequency of pain:2 days  Location of ulcerations/white patches(if present) Tongue is swollen. White on tongue.   Rates: 3/10  Any difficulty swallowing?:denies  Hoarseness?:a little, more like a weakness  Current interventions tried?:Fluconazole 200mg oral tablet daily  Other associated symptoms denies decrease oral intake, xerostomia, bleeding gums.      Denies fevers/chills, cough, sore throat, SOB, n/v, bowel & bladder issues or other acute symptoms of concerns    [facility]    Pt wondering if cycle #5 needs a provider visit added on and wondering about cycle #6 being scheduled.   This writer sent request to scheduling.    Recommendations:   This writer educated on home care for mucositis including:  -Practice good oral hygiene  -Rinse mouth with 1tsp salt, 1tsp baking soda, 1qt of water, swish and spit up to every 2hours while awake depending on pain level  -Brush teeth after every meal with a soft toothbrush and a gentle touch  -Do not use mouthwash with alcohol  -Use water-based moisturizer  -Avoid spicy, acidic foods  -Chew food longer, use a straw if needed, puree foods if needed  -Encouraged increase in fluid intake as tolerated    Routed/Paged provider Dr. Winters  1725 Per Dr. Winters, aware of symptoms, Usually Taxotere does not cause oral thrush, however given pt history,  continue taking current course of fluconazole, TORB for  Nystatin 100,000Unit/ml,  Swish and Swallow 5mls by mouth, four times daily, dispense 200ml.     Follow-Up Plan:  This writer called and relayed information to Pt. Asked pt to repeat back info/plan.  Pt was able to reverbalize understanding, also clarified pt hx of Oral thrush r/t on Prednisone to counteract side  effects of Taxotere.  Copied and pasted from last provider visit.     Instructed patient to seek care immediately for worsening symptoms, including: fever 100.4F or above with suspected neutropenia, chest pain, shortness of breath, severe ulceration and unable to tolerate oral intake, uncontrolled oral bleeding

## 2022-08-26 LAB — TESTOST SERPL-MCNC: <2 NG/DL (ref 240–950)

## 2022-09-09 NOTE — PROGRESS NOTES
"  [facility] Followup  Oncologist: Dr. Jj Winters  Sep 12, 2022         REASON FOR VISIT: Follow-up for metastatic castration-resistant prostate cancer, here for cycle 4 Docetaxel    HISTORY OF PRESENT ILLNESS: Mr. 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 His oncologic history is detailed below.     1/6/21  PSA = 0.29  3/31/21  PSA = 0.44  7/7/21  PSA = 0.47  11/2021  PSA = 1.05  -- Start XTANDI  12/16/21 PSA=0.22  2/11/22  PSA= 0.50  3/22/22  PSA=0.72  5/5/2022 PSA=1.79  7/11/2022 PSA=2.16 --Start cycle 1 docetaxel     Interval History:.     ONCOLOGIC HISTORY:  1. 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 Naylor at [facility]. Prior PSA were 1.4 on 8/16/17, 2.4 on 6/28/16, 2.9 on 6/28/16, and as low as 0.4 on 3/26/2003.   - 1/08/2019: Consultation with Sarah Wilson CNP in Urology clinic. Repeat PSA 9.6.  - 1/16/2019: MRI prostate with contrast - \"This examination is characterized as PIRADS 5- very high probability. Clinically significant cancer is highly likely to be present. There is a large, invasive mass arising from the right peripheral zone and extending into the neurovascular bundle, seminal vesicle, and along the anterolateral right mesorectal fascia. Metastatic right external iliac lymph node. Metastatic lesion in the posterior/superior right acetabulum.\"  - 1/25/2019: CT abdomen and pelvis with IV contrast - \"1. Heterogeneous enhancing mass posteriorly in the upper pole of the right kidney measures 4.5 x 5.8 x 5.7 cm (AP by transverse by craniocaudal). It has a small nodular component extending posterior medially which abuts the right psoas muscle. This nodular " "extension measures 2.1 x 2.1 cm. Minimal stranding about the mass. No definite thrombus within the right renal vein. This renal mass is compatible with a renal cell carcinoma. A paraaortic lymph node situated immediately posterior to the left renal vein measures 1.1 cm in short axis, suspicious for metastasis. 2. A 2 cm right external iliac lymph node is also suspicious for metastases. 3. Multiple sclerotic osseous lesions suspicious for metastases. These include 0.8 and 0.6 cm sclerotic lesions laterally in the right iliac wing (images 53 and 62 respectively). Ill-defined groundglass density laterally in the right acetabulum measuring 1.7 x 1.2 cm corresponds with the lesion identified on MRI. A 0.4 cm groundglass density in the left acetabulum. Sclerotic lesion in the left femoral neck measures 0.9 x 1.6 cm (image 81). Sclerotic metastases would be more compatible with prostate metastases. 4. A 3 subcentimeter hepatic lesions are indeterminate. Metastases would be a consideration. These can be further characterized with liver MRI.\"  - 1/25/2019: NM bone scan - \"There is focal bony uptake in the left femoral neck, right acetabulum, right of midline at the S1 or L5 level of the spine, within multiple bilateral ribs, in the C7 or T1 level of the spine, and anteriorly within the skull. Findings are suspicious for metastases.\"  - 1/31/19: CT chest with contrast - \" No suspicious nodules in the chest.  Stable appearance of a 5.8 cm right renal mass concerning for renal carcinoma until proven otherwise.  Stable indeterminant subcentimeter hypodensities in the liver.  Multifocal osteoblastic metastasis including 2.5 cm lesion in the right fifth rib posteriorly and a 1.6 cm lesion in the right third rib posteriorly. No lytic lesions identified.\"  - 2/6/19: CT-guided right sclerotic fifth rib lesion biopsy - \"Metastatic carcinoma, consistent with prostate primary.  Immunohistochemical stains performed show the metastatic " "carcinoma stains positive for NKX3.1 (prostate marker), negative for STACIE 3 and PAX8, which supports the above diagnosis.\"  - 2/15/19: Started Casodex 50mg every day and consented for the biobanking protocol. 3/18/19 - stopped Casodex.  - 2/19/19: Case discussed in tumor board - recommendation for nephectomy for suspected malignant right renal mass.   - 2/26/19: Started Lupron 22.5mg every 3 months.   - 5/8/19: Started Docetaxel 75mg/m2 IV every 3 weeks. 06/18/19 - cycle 3, 7/10/19 - cycle 4, 07/31/19 - cycle 5, 08/21/19 - cycle 6.   - 10/2/19: Restaging CT CAP and NM Bone Scan showed improved osseous disease, no evidence of recurrent or new metastatic disease.  - 1/5/20: Restaging CT CAP and NM Bone Scan showed stable osseous disease, no evidence of recurrent or new metastatic disease.  - 4/6/20: NM bone scan with slightly improved uptake in known skeletal mets. CT C/A/P with contrast with stable osseous mets, no yusuf enlargement, no new visceral mets etc.  - 04/08/20: Zometa every 3 months.  - 10/5/20: CT C/A/P with contrast and NM bone scan - SD.   - 1/4/21: CT C/A/P with contrast and NM bone scan - SD but slight increase in right 5th rib tracer uptake and in posterior L5 sclerosis.   - 03/29/21: CT C/A/P with contrast - single new right sacral 8mm bone lesion (suspicious), other bone mets stable. No visceral/yusuf disease. Nephrectomy site without recurrence. NM bone scan - SD but \"increased uptake associated with the prior lesions of the lower  cervical spine, posterior right fourth rib and right L5 posterior arch elements. Otherwise unchanged uptake associated with the proximal left femur and left anterior fourth rib. Similar uptake of the left halux, possibly secondary to degenerative osteoarthritis or gout.\"  -  START XTANDI  -  RT to L5 / S1  -  STOPPED XTANDI   -07/11/2022  START Cycle 1 Docetaxel     2. Stage III ([id number]), grade 3 of 4, clear-cell RCC of right kidney:  - " "Incidentally diagnosed as above.   - Underwent curative-intent robotic right radical nephrectomy with Dr. Wesley Cleveland on 3/19/19. No tumor spillage per op report.   - Path showed: \"Histologic Type: Clear cell renal cell carcinoma; Sarcomatoid Features: Not identified; Histologic Grade: Nucleolar grade 3 (WHO/ISUP). Extent Tumor Size: 4.3 cm. Microscopic Tumor Extension: Tumor extension into renal sinus (in vascular structures). Margins: Negative. Tumor Necrosis: Present; focal. Lymph-Vascular Invasion: Not identified.  Pathologic Staging (pTNM) Primary Tumor (pT): pT3a: Tumor extends into renal vein branches/renal sinus. Regional Lymph Nodes (pN): pNX. Number of Lymph Nodes Examined: 0 Distant Metastasis (pM): pM N/A.\"  - Restaging scans as above.    INTERVAL HISTORY:   Walter presents for oncology follow-up visit today prior to Cycle 4 Docetaxel. Thus far he is tolerating well without acute toxicity. Notes some mouth sensitivity and recurrent thrush with steroid use. He has been taking fluconazole daily and has nystatin at home for recurrent flares. Continues to have mild neuropathy in toes but overall feels this is better since cycle 1. Has start of hair loss and scalp tenderness. His appetite is good. Has some mild and intermittent pain in the left pyriformis that improves with stretching. He denies new pain. Normal bowel function, notes 1-2 episodes diarrhea on the saturdays after infusion managed with imodium. Some upper abdominal pain that improves with compazine after infusions. Has 2-3 hot flashes per night. No nausea/vomiting. His breathing is good. No CP. No fevers or chills. No mouth sores. ROS is otherwise negative.     PAST MEDICAL HISTORY:  Past Medical History:   Diagnosis Date     Cancer of kidney, right (H)      Complication of anesthesia     slow wakeup      Malignant neoplasm of prostate metastatic to bone (H) 2/14/2019     Thyroid disease      PAST SURGICAL HISTORY:   Past Surgical History: "   Procedure Laterality Date     CYSTOSCOPY      about 10 years ago     INSERT PORT VASCULAR ACCESS Right 5/2/2019    Procedure: Chest Port Placement;  Surgeon: Tate Burciaga PA-C;  Location: [facility] OR     IR CHEST PORT PLACEMENT > 5 YRS OF AGE  5/2/2019     LAPAROSCOPIC NEPHRECTOMY Right 3/19/2019    Procedure: Right laparoscopic radical nephrectomy;  Surgeon: Wesley Cleveland MD;  Location: [facility] OR      SOCIAL HISTORY:   Social History     Tobacco Use     Smoking status: Never Smoker     Smokeless tobacco: Never Used   Substance Use Topics     Drug use: No     FAMILY HISTORY:   Family History   Problem Relation Age of Onset     Diabetes Father      ALLERGIES:   Allergies   Allergen Reactions     Doxycycline GI Disturbance     CURRENT MEDICATIONS:   Current Outpatient Medications:      amLODIPine (NORVASC) 10 MG tablet, Take 1 tablet (10 mg) by mouth daily, Disp: 30 tablet, Rfl: 0     atorvastatin (LIPITOR) 20 MG tablet, Take 60 mg by mouth daily , Disp: , Rfl:      calcium citrate-vitamin D (CITRACAL) 315-250 MG-UNIT TABS per tablet, Take 650 mg by mouth 2 times daily , Disp: , Rfl:      cycloSPORINE (RESTASIS) 0.05 % ophthalmic emulsion, Place 1 drop into both eyes 2 times daily , Disp: , Rfl:      dexamethasone (DECADRON) 4 MG tablet, Take 2 tablets (8 mg) by mouth 2 times daily (with meals) for 3 doses Start evening of Docetaxel infusion and continue for a total of 3 doses., Disp: 6 tablet, Rfl: 9     fluconazole (DIFLUCAN) 200 MG tablet, Take 1 tablet (200 mg) by mouth daily, Disp: 60 tablet, Rfl: 1     Glucosamine-Chondroit-Vit C-Mn (GLUCOSAMINE 1500 COMPLEX) CAPS, Take 1 capsule by mouth daily, Disp: , Rfl:      levothyroxine (SYNTHROID/LEVOTHROID) 112 MCG tablet, Take 112 mcg by mouth daily, Disp: , Rfl:      loratadine (CLARITIN) 10 MG tablet, , Disp: , Rfl:      MAGNESIUM PO, Take 250 mg by mouth 2 times daily , Disp: , Rfl:      MEBENDAZOLE PO, Take 225 mg by mouth daily , Disp: , Rfl:       Multiple Vitamins-Minerals (MULTIVITAMIN ADULT EXTRA C PO), Take 1 tablet by mouth every 24 hours, Disp: , Rfl:      Nutritional Supplements (SALMON OIL) CAPS, Take 2 capsules by mouth daily , Disp: , Rfl:      nystatin (MYCOSTATIN) 479211 UNIT/ML suspension, Take 5 mLs (500,000 Units) by mouth 4 times daily Swish and Swallow, Disp: 200 mL, Rfl: 1     omeprazole (PRILOSEC) 20 MG DR capsule, Take 20 mg by mouth daily, Disp: , Rfl:      predniSONE (DELTASONE) 5 MG tablet, Take 1 tablet (5 mg) by mouth 2 times daily for 21 days, Disp: 42 tablet, Rfl: 0     prochlorperazine (COMPAZINE) 10 MG tablet, Take 1 tablet (10 mg) by mouth every 6 hours as needed for nausea or vomiting (Patient not taking: No sig reported), Disp: 30 tablet, Rfl: 2     prochlorperazine (COMPAZINE) 10 MG tablet, Take 1 tablet (10 mg) by mouth every 6 hours as needed for nausea or vomiting (Patient not taking: No sig reported), Disp: 30 tablet, Rfl: 3     tamsulosin (FLOMAX) 0.4 MG capsule, Take 1 capsule (0.4 mg) by mouth daily, Disp: 30 capsule, Rfl: 3    Physical Exam:   /74   Pulse 71   Temp 98.5  F (36.9  C)   Resp 18   Wt 109.3 kg (241 lb)   SpO2 97%   BMI 33.35 kg/m     General: well appearing, no acute distress, pleasant male   HEENT: normocephalic, atraumatic, PERRLA, sclerae nonicteric. Small amount of oral thrush is present on the tongue.   CV: regular rate and rhythm, no murmurs  Lungs: clear to auscultation bilaterally, no wheezes/rales/rhonchi  Abd: soft, positive bowel sounds, non-distended, non-tender  MSK: full range of motion in all four extremities, no peripheral edema  Neuro: alert and oriented x3, CN grossly intact   Psych: appropriate mood and affect  Skin: no rashes or lesions    ECOG PS 1.    LABORATORY DATA:  Most Recent 3 CBC's:  Recent Labs   Lab Test 09/12/22  0933 08/22/22  1036 08/01/22  1113   WBC 5.4 5.9 6.1   HGB 12.8* 12.2* 12.5*   MCV 93 92 92    211 179   ANEUTAUTO 3.4 3.8 4.3     Most Recent 3  "BMP's:  Recent Labs   Lab Test 09/12/22  0933 08/22/22  1036 08/01/22  1113    142 143   POTASSIUM 3.9 3.9 4.1   CHLORIDE 105 108 111*   CO2 23 26 24   BUN 12.4 15 15   CR 0.85 0.87 0.88   ANIONGAP 12 8 8   BETHANY 8.9 8.5 8.8   GLC 85 81 115*   PROTTOTAL 6.5 6.8 7.0   ALBUMIN 4.1 3.6 3.7    Most Recent 3 LFT's:  Recent Labs   Lab Test 09/12/22  0933 08/22/22  1036 08/01/22  1113   AST 23 17 13   ALT 21 25 32   ALKPHOS 58 66 80   BILITOTAL 0.6 0.7 0.4    Most Recent 2 TSH and T4:  Recent Labs   Lab Test 07/07/21  0834 03/31/21  0824   TSH 1.67 1.63     PSA and Testosterone 09/12/22: pending.    Latest Reference Range & Units 07/07/21 08:34 08/01/22 11:13 08/22/22 10:36   Testosterone Total 240 - 950 ng/dL 6 (L) <2 (L) <2 (L)   (L): Data is abnormally low     Latest Reference Range & Units 05/05/22 11:14 07/11/22 09:22 08/01/22 11:13 08/22/22 10:36   PSA 0.00 - 4.00 ug/L 1.79 2.16 1.78 1.36     I reviewed the above labs today.    ASSESSMENT & PLAN: Mr. Reyes is a delightful 62 year old gentleman with metastatic castration sensitive prostate adenocarcinoma as well as an incidentally diagnosed stage III clear-cell renal cell carcinoma of the right kidney (s/p radical R nephrectomy 3/19/19), who is here for a follow-up visit and initiation of docetaxel for now metastatic castration-resistant prostate cancer.     1. Metastatic castration-resistant prostate cancer, with involvement of the bones and RPLN:   - Patient met the criteria for CHAARTED \"high-volume\" metastatic hormone-sensitive prostate cancer. He opted for docetaxel in combination with ADT (per JOSEFA, [id number], STAMPEDE). Tolerated 6 cycles of docetaxel fairly well (5/8/19 through 8/21/19), with the exception of mild fatigue and mild neuropathy.   - Continue leuprolide 22.5mg every 3 months, last dosed on 08/02/22. Will plan to give on 10/25/22 with appointment when Walter returns for cycle 6.   - S/P radiation to  L5/S1 (4/2022) with " resolution of low back pain and left leg numbness.   - Obtain blood for CFDNA  - Rising PSA from 0.72 (3/22/22)-->1.79 (5/5/22). He had CT scan and NM Bone Scan done 5/5 which confirmed disease progression. Stopped Xtandi and began Docetaxel for metastatic castration resistant prostate cancer. Tolerated cycle 1 well, IV benadryl as premed without reaction so will keep. His PSA level is responding. pre-docetaxel PSA 2.16. Most recently PSA from 08/22/22 down trending to 1.36. PSA and Testosterone pending today.   -labs and assessment ok to proceed with cycle 4 today.   -Plan for 4-6 cycles of chemotherapy depending on tolerance to treatment. Follow up with Dr. Winters on 10/03/22 as scheduled with infusion after.     2. Bone metastases:   - Noted to have osseous metastatic disease at the time of diagnosis. Last bone scan (5/5/22) showed new focus of increased uptake left superior acetabulum and distal left femoral diaphysis; slight increased uptake in the left first rib; stable increased uptake in the other metastatic foci.   - Encouraged to continue calcium and vitamin D supplementation.  - Will continue Zometa 4mg IV every ~3 months, last dosed on 8/2. Will plan to give on 10/25/22 with appointment when Walter returns for cycle 6.         3. Stage 3, UISS-high risk ccRCC: s/p R nephrectomy   - approximately 40-50% risk of recurrence after definitive surgery.   - no clear evidence of residual disease at this time; the retroperitoneal lymphadenopathy is more consistent with metastatic prostate cancer   - Continue active surveillance with self-reporting for signs/symptoms of recurrence (this was previously explained in detail) and periodic H&P and scans.    4. Thrush  Recurrent with prednisone use. Fluconazole maintenance 200 mg daily and nystatin swoosh and swallow prn for flares.     35 minutes spent on the date of the encounter doing chart review, review of test results, interpretation of tests, patient visit and  documentation      Yamilet Espinoza PA-C

## 2022-09-12 ENCOUNTER — APPOINTMENT (OUTPATIENT)
Dept: LAB | Facility: CLINIC | Age: 62
End: 2022-09-12
Attending: PHYSICIAN ASSISTANT
Payer: COMMERCIAL

## 2022-09-12 ENCOUNTER — ONCOLOGY VISIT (OUTPATIENT)
Dept: ONCOLOGY | Facility: CLINIC | Age: 62
End: 2022-09-12
Attending: PHYSICIAN ASSISTANT
Payer: COMMERCIAL

## 2022-09-12 VITALS
DIASTOLIC BLOOD PRESSURE: 74 MMHG | TEMPERATURE: 98.5 F | OXYGEN SATURATION: 97 % | BODY MASS INDEX: 33.35 KG/M2 | RESPIRATION RATE: 18 BRPM | SYSTOLIC BLOOD PRESSURE: 118 MMHG | WEIGHT: 241 LBS | HEART RATE: 71 BPM

## 2022-09-12 DIAGNOSIS — C61 MALIGNANT NEOPLASM OF PROSTATE METASTATIC TO BONE (H): Primary | ICD-10-CM

## 2022-09-12 DIAGNOSIS — C79.51 MALIGNANT NEOPLASM OF PROSTATE METASTATIC TO BONE (H): Primary | ICD-10-CM

## 2022-09-12 DIAGNOSIS — B37.0 THRUSH: ICD-10-CM

## 2022-09-12 LAB
ALBUMIN SERPL BCG-MCNC: 4.1 G/DL (ref 3.5–5.2)
ALP SERPL-CCNC: 58 U/L (ref 40–129)
ALT SERPL W P-5'-P-CCNC: 21 U/L (ref 10–50)
ANION GAP SERPL CALCULATED.3IONS-SCNC: 12 MMOL/L (ref 7–15)
AST SERPL W P-5'-P-CCNC: 23 U/L (ref 10–50)
BASOPHILS # BLD AUTO: 0 10E3/UL (ref 0–0.2)
BASOPHILS NFR BLD AUTO: 1 %
BILIRUB SERPL-MCNC: 0.6 MG/DL
BUN SERPL-MCNC: 12.4 MG/DL (ref 8–23)
CALCIUM SERPL-MCNC: 8.9 MG/DL (ref 8.8–10.2)
CHLORIDE SERPL-SCNC: 105 MMOL/L (ref 98–107)
CREAT SERPL-MCNC: 0.85 MG/DL (ref 0.67–1.17)
DEPRECATED HCO3 PLAS-SCNC: 23 MMOL/L (ref 22–29)
EOSINOPHIL # BLD AUTO: 0 10E3/UL (ref 0–0.7)
EOSINOPHIL NFR BLD AUTO: 0 %
ERYTHROCYTE [DISTWIDTH] IN BLOOD BY AUTOMATED COUNT: 15.3 % (ref 10–15)
GFR SERPL CREATININE-BSD FRML MDRD: >90 ML/MIN/1.73M2
GLUCOSE SERPL-MCNC: 85 MG/DL (ref 70–99)
HCT VFR BLD AUTO: 39.3 % (ref 40–53)
HGB BLD-MCNC: 12.8 G/DL (ref 13.3–17.7)
IMM GRANULOCYTES # BLD: 0 10E3/UL
IMM GRANULOCYTES NFR BLD: 1 %
LYMPHOCYTES # BLD AUTO: 1.4 10E3/UL (ref 0.8–5.3)
LYMPHOCYTES NFR BLD AUTO: 25 %
MCH RBC QN AUTO: 30.1 PG (ref 26.5–33)
MCHC RBC AUTO-ENTMCNC: 32.6 G/DL (ref 31.5–36.5)
MCV RBC AUTO: 93 FL (ref 78–100)
MONOCYTES # BLD AUTO: 0.6 10E3/UL (ref 0–1.3)
MONOCYTES NFR BLD AUTO: 11 %
NEUTROPHILS # BLD AUTO: 3.4 10E3/UL (ref 1.6–8.3)
NEUTROPHILS NFR BLD AUTO: 62 %
NRBC # BLD AUTO: 0 10E3/UL
NRBC BLD AUTO-RTO: 0 /100
PLATELET # BLD AUTO: 188 10E3/UL (ref 150–450)
POTASSIUM SERPL-SCNC: 3.9 MMOL/L (ref 3.4–5.3)
PROT SERPL-MCNC: 6.5 G/DL (ref 6.4–8.3)
PSA SERPL-MCNC: 1.09 NG/ML (ref 0–4.5)
RBC # BLD AUTO: 4.25 10E6/UL (ref 4.4–5.9)
SODIUM SERPL-SCNC: 140 MMOL/L (ref 136–145)
WBC # BLD AUTO: 5.4 10E3/UL (ref 4–11)

## 2022-09-12 PROCEDURE — 250N000011 HC RX IP 250 OP 636: Performed by: NURSE PRACTITIONER

## 2022-09-12 PROCEDURE — 84403 ASSAY OF TOTAL TESTOSTERONE: CPT

## 2022-09-12 PROCEDURE — 85014 HEMATOCRIT: CPT | Performed by: PHYSICIAN ASSISTANT

## 2022-09-12 PROCEDURE — 96413 CHEMO IV INFUSION 1 HR: CPT

## 2022-09-12 PROCEDURE — 250N000011 HC RX IP 250 OP 636

## 2022-09-12 PROCEDURE — 99214 OFFICE O/P EST MOD 30 MIN: CPT

## 2022-09-12 PROCEDURE — 36591 DRAW BLOOD OFF VENOUS DEVICE: CPT

## 2022-09-12 PROCEDURE — 84153 ASSAY OF PSA TOTAL: CPT

## 2022-09-12 PROCEDURE — 96367 TX/PROPH/DG ADDL SEQ IV INF: CPT

## 2022-09-12 PROCEDURE — G0463 HOSPITAL OUTPT CLINIC VISIT: HCPCS

## 2022-09-12 PROCEDURE — 80053 COMPREHEN METABOLIC PANEL: CPT

## 2022-09-12 PROCEDURE — 96375 TX/PRO/DX INJ NEW DRUG ADDON: CPT

## 2022-09-12 PROCEDURE — 258N000003 HC RX IP 258 OP 636: Performed by: NURSE PRACTITIONER

## 2022-09-12 RX ORDER — MEPERIDINE HYDROCHLORIDE 25 MG/ML
25 INJECTION INTRAMUSCULAR; INTRAVENOUS; SUBCUTANEOUS EVERY 30 MIN PRN
Status: CANCELLED | OUTPATIENT
Start: 2022-09-12

## 2022-09-12 RX ORDER — PREDNISONE 5 MG/1
5 TABLET ORAL 2 TIMES DAILY
Qty: 42 TABLET | Refills: 0 | Status: SHIPPED | OUTPATIENT
Start: 2022-09-12 | End: 2023-01-09

## 2022-09-12 RX ORDER — HEPARIN SODIUM (PORCINE) LOCK FLUSH IV SOLN 100 UNIT/ML 100 UNIT/ML
5 SOLUTION INTRAVENOUS
Status: CANCELLED | OUTPATIENT
Start: 2022-09-12

## 2022-09-12 RX ORDER — ALBUTEROL SULFATE 0.83 MG/ML
2.5 SOLUTION RESPIRATORY (INHALATION)
Status: CANCELLED | OUTPATIENT
Start: 2022-09-12

## 2022-09-12 RX ORDER — METFORMIN HCL 500 MG
TABLET, EXTENDED RELEASE 24 HR ORAL
COMMUNITY
Start: 2021-12-07 | End: 2023-07-07

## 2022-09-12 RX ORDER — ALBUTEROL SULFATE 90 UG/1
1-2 AEROSOL, METERED RESPIRATORY (INHALATION)
Status: CANCELLED
Start: 2022-09-12

## 2022-09-12 RX ORDER — EPINEPHRINE 1 MG/ML
0.3 INJECTION, SOLUTION INTRAMUSCULAR; SUBCUTANEOUS EVERY 5 MIN PRN
Status: CANCELLED | OUTPATIENT
Start: 2022-09-12

## 2022-09-12 RX ORDER — HEPARIN SODIUM (PORCINE) LOCK FLUSH IV SOLN 100 UNIT/ML 100 UNIT/ML
5 SOLUTION INTRAVENOUS
Status: DISCONTINUED | OUTPATIENT
Start: 2022-09-12 | End: 2022-09-12 | Stop reason: HOSPADM

## 2022-09-12 RX ORDER — HEPARIN SODIUM,PORCINE 10 UNIT/ML
5 VIAL (ML) INTRAVENOUS
Status: CANCELLED | OUTPATIENT
Start: 2022-09-12

## 2022-09-12 RX ORDER — HEPARIN SODIUM (PORCINE) LOCK FLUSH IV SOLN 100 UNIT/ML 100 UNIT/ML
5 SOLUTION INTRAVENOUS EVERY 8 HOURS
Status: DISCONTINUED | OUTPATIENT
Start: 2022-09-12 | End: 2022-09-12 | Stop reason: HOSPADM

## 2022-09-12 RX ORDER — PREDNISONE 5 MG/1
5 TABLET ORAL 2 TIMES DAILY
Qty: 42 TABLET | Refills: 0 | Status: SHIPPED | OUTPATIENT
Start: 2022-09-12 | End: 2022-10-03

## 2022-09-12 RX ORDER — METHYLPREDNISOLONE SODIUM SUCCINATE 125 MG/2ML
125 INJECTION, POWDER, LYOPHILIZED, FOR SOLUTION INTRAMUSCULAR; INTRAVENOUS
Status: CANCELLED
Start: 2022-09-12

## 2022-09-12 RX ORDER — DEXAMETHASONE 4 MG/1
8 TABLET ORAL 2 TIMES DAILY WITH MEALS
Qty: 6 TABLET | Refills: 9 | Status: SHIPPED | OUTPATIENT
Start: 2022-09-12 | End: 2022-10-24

## 2022-09-12 RX ORDER — NALOXONE HYDROCHLORIDE 0.4 MG/ML
0.2 INJECTION, SOLUTION INTRAMUSCULAR; INTRAVENOUS; SUBCUTANEOUS
Status: CANCELLED | OUTPATIENT
Start: 2022-09-12

## 2022-09-12 RX ORDER — DIPHENHYDRAMINE HYDROCHLORIDE 50 MG/ML
50 INJECTION INTRAMUSCULAR; INTRAVENOUS
Status: CANCELLED
Start: 2022-09-12

## 2022-09-12 RX ORDER — LORAZEPAM 2 MG/ML
0.5 INJECTION INTRAMUSCULAR EVERY 4 HOURS PRN
Status: CANCELLED | OUTPATIENT
Start: 2022-09-12

## 2022-09-12 RX ADMIN — DIPHENHYDRAMINE HYDROCHLORIDE 50 MG: 50 INJECTION INTRAMUSCULAR; INTRAVENOUS at 10:40

## 2022-09-12 RX ADMIN — DEXAMETHASONE SODIUM PHOSPHATE: 10 INJECTION, SOLUTION INTRAMUSCULAR; INTRAVENOUS at 10:51

## 2022-09-12 RX ADMIN — Medication 5 ML: at 12:14

## 2022-09-12 RX ADMIN — SODIUM CHLORIDE 250 ML: 9 INJECTION, SOLUTION INTRAVENOUS at 10:41

## 2022-09-12 RX ADMIN — DOCETAXEL 172 MG: 20 INJECTION, SOLUTION, CONCENTRATE INTRAVENOUS at 11:09

## 2022-09-12 RX ADMIN — Medication 5 ML: at 09:33

## 2022-09-12 ASSESSMENT — PAIN SCALES - GENERAL: PAINLEVEL: NO PAIN (0)

## 2022-09-12 NOTE — LETTER
"    9/12/2022         RE: Walter Reyes  [address] [facility] [address]        Dear Colleague,    Thank you for referring your patient, Walter Reyes, to the [facility]. Please see a copy of my visit note below.      [facility] Oncology Followup  Oncologist: Dr. Jj Winters  Sep 12, 2022         REASON FOR VISIT: Follow-up for metastatic castration-resistant prostate cancer, here for cycle 4 Docetaxel    HISTORY OF PRESENT ILLNESS: Mr. 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 His oncologic history is detailed below.     1/6/21  PSA = 0.29  3/31/21  PSA = 0.44  7/7/21  PSA = 0.47  11/2021  PSA = 1.05  -- Start XTANDI  12/16/21 PSA=0.22  2/11/22  PSA= 0.50  3/22/22  PSA=0.72  5/5/2022 PSA=1.79  7/11/2022 PSA=2.16 --Start cycle 1 docetaxel     Interval History:.     ONCOLOGIC HISTORY:  1. 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 Naylor at [facility]. Prior PSA were 1.4 on 8/16/17, 2.4 on 6/28/16, 2.9 on 6/28/16, and as low as 0.4 on 3/26/2003.   - 1/08/2019: Consultation with Sarah Wilson CNP in Urology clinic. Repeat PSA 9.6.  - 1/16/2019: MRI prostate with contrast - \"This examination is characterized as PIRADS 5- very high probability. Clinically significant cancer is highly likely to be present. There is a large, invasive mass arising from the right peripheral zone and extending into the neurovascular bundle, seminal vesicle, and along the anterolateral right mesorectal fascia. Metastatic right external iliac lymph node. Metastatic lesion in the posterior/superior right acetabulum.\"  - 1/25/2019: CT abdomen and pelvis " "with IV contrast - \"1. Heterogeneous enhancing mass posteriorly in the upper pole of the right kidney measures 4.5 x 5.8 x 5.7 cm (AP by transverse by craniocaudal). It has a small nodular component extending posterior medially which abuts the right psoas muscle. This nodular extension measures 2.1 x 2.1 cm. Minimal stranding about the mass. No definite thrombus within the right renal vein. This renal mass is compatible with a renal cell carcinoma. A paraaortic lymph node situated immediately posterior to the left renal vein measures 1.1 cm in short axis, suspicious for metastasis. 2. A 2 cm right external iliac lymph node is also suspicious for metastases. 3. Multiple sclerotic osseous lesions suspicious for metastases. These include 0.8 and 0.6 cm sclerotic lesions laterally in the right iliac wing (images 53 and 62 respectively). Ill-defined groundglass density laterally in the right acetabulum measuring 1.7 x 1.2 cm corresponds with the lesion identified on MRI. A 0.4 cm groundglass density in the left acetabulum. Sclerotic lesion in the left femoral neck measures 0.9 x 1.6 cm (image 81). Sclerotic metastases would be more compatible with prostate metastases. 4. A 3 subcentimeter hepatic lesions are indeterminate. Metastases would be a consideration. These can be further characterized with liver MRI.\"  - 1/25/2019: NM bone scan - \"There is focal bony uptake in the left femoral neck, right acetabulum, right of midline at the S1 or L5 level of the spine, within multiple bilateral ribs, in the C7 or T1 level of the spine, and anteriorly within the skull. Findings are suspicious for metastases.\"  - 1/31/19: CT chest with contrast - \" No suspicious nodules in the chest.  Stable appearance of a 5.8 cm right renal mass concerning for renal carcinoma until proven otherwise.  Stable indeterminant subcentimeter hypodensities in the liver.  Multifocal osteoblastic metastasis including 2.5 cm lesion in the right fifth rib " "posteriorly and a 1.6 cm lesion in the right third rib posteriorly. No lytic lesions identified.\"  - 2/6/19: CT-guided right sclerotic fifth rib lesion biopsy - \"Metastatic carcinoma, consistent with prostate primary.  Immunohistochemical stains performed show the metastatic carcinoma stains positive for NKX3.1 (prostate marker), negative for STACIE 3 and PAX8, which supports the above diagnosis.\"  - 2/15/19: Started Casodex 50mg every day and consented for the biobanking protocol. 3/18/19 - stopped Casodex.  - 2/19/19: Case discussed in tumor board - recommendation for nephectomy for suspected malignant right renal mass.   - 2/26/19: Started Lupron 22.5mg every 3 months.   - 5/8/19: Started Docetaxel 75mg/m2 IV every 3 weeks. 06/18/19 - cycle 3, 7/10/19 - cycle 4, 07/31/19 - cycle 5, 08/21/19 - cycle 6.   - 10/2/19: Restaging CT CAP and NM Bone Scan showed improved osseous disease, no evidence of recurrent or new metastatic disease.  - 1/5/20: Restaging CT CAP and NM Bone Scan showed stable osseous disease, no evidence of recurrent or new metastatic disease.  - 4/6/20: NM bone scan with slightly improved uptake in known skeletal mets. CT C/A/P with contrast with stable osseous mets, no yusuf enlargement, no new visceral mets etc.  - 04/08/20: Zometa every 3 months.  - 10/5/20: CT C/A/P with contrast and NM bone scan - SD.   - 1/4/21: CT C/A/P with contrast and NM bone scan - SD but slight increase in right 5th rib tracer uptake and in posterior L5 sclerosis.   - 03/29/21: CT C/A/P with contrast - single new right sacral 8mm bone lesion (suspicious), other bone mets stable. No visceral/yusuf disease. Nephrectomy site without recurrence. NM bone scan - SD but \"increased uptake associated with the prior lesions of the lower  cervical spine, posterior right fourth rib and right L5 posterior arch elements. Otherwise unchanged uptake associated with the proximal left femur and left anterior fourth rib. Similar uptake of " "the left halux, possibly secondary to degenerative osteoarthritis or gout.\"  -  START XTANDI  -  RT to L5 / S1  -  STOPPED XTANDI   -07/11/2022  START Cycle 1 Docetaxel     2. Stage III ([id number]), grade 3 of 4, clear-cell RCC of right kidney:  - Incidentally diagnosed as above.   - Underwent curative-intent robotic right radical nephrectomy with Dr. Wesley Cleveland on 3/19/19. No tumor spillage per op report.   - Path showed: \"Histologic Type: Clear cell renal cell carcinoma; Sarcomatoid Features: Not identified; Histologic Grade: Nucleolar grade 3 (WHO/ISUP). Extent Tumor Size: 4.3 cm. Microscopic Tumor Extension: Tumor extension into renal sinus (in vascular structures). Margins: Negative. Tumor Necrosis: Present; focal. Lymph-Vascular Invasion: Not identified.  Pathologic Staging (pTNM) Primary Tumor (pT): pT3a: Tumor extends into renal vein branches/renal sinus. Regional Lymph Nodes (pN): pNX. Number of Lymph Nodes Examined: 0 Distant Metastasis (pM): pM N/A.\"  - Restaging scans as above.    INTERVAL HISTORY:   Walter presents for oncology follow-up visit today prior to Cycle 4 Docetaxel. Thus far he is tolerating well without acute toxicity. Notes some mouth sensitivity and recurrent thrush with steroid use. He has been taking fluconazole daily and has nystatin at home for recurrent flares. Continues to have mild neuropathy in toes but overall feels this is better since cycle 1. Has start of hair loss and scalp tenderness. His appetite is good. Has some mild and intermittent pain in the left pyriformis that improves with stretching. He denies new pain. Normal bowel function, notes 1-2 episodes diarrhea on the saturdays after infusion managed with imodium. Some upper abdominal pain that improves with compazine after infusions. Has 2-3 hot flashes per night. No nausea/vomiting. His breathing is good. No CP. No fevers or chills. No mouth sores. ROS is otherwise negative.     PAST MEDICAL " HISTORY:  Past Medical History:   Diagnosis Date     Cancer of kidney, right (H)      Complication of anesthesia     slow wakeup      Malignant neoplasm of prostate metastatic to bone (H) 2/14/2019     Thyroid disease      PAST SURGICAL HISTORY:   Past Surgical History:   Procedure Laterality Date     CYSTOSCOPY      about 10 years ago     INSERT PORT VASCULAR ACCESS Right 5/2/2019    Procedure: Chest Port Placement;  Surgeon: Tate Burciaga PA-C;  Location: [facility] OR     IR CHEST PORT PLACEMENT > 5 YRS OF AGE  5/2/2019     LAPAROSCOPIC NEPHRECTOMY Right 3/19/2019    Procedure: Right laparoscopic radical nephrectomy;  Surgeon: Wesley Cleveland MD;  Location: [facility] OR      SOCIAL HISTORY:   Social History     Tobacco Use     Smoking status: Never Smoker     Smokeless tobacco: Never Used   Substance Use Topics     Drug use: No     FAMILY HISTORY:   Family History   Problem Relation Age of Onset     Diabetes Father      ALLERGIES:   Allergies   Allergen Reactions     Doxycycline GI Disturbance     CURRENT MEDICATIONS:   Current Outpatient Medications:      amLODIPine (NORVASC) 10 MG tablet, Take 1 tablet (10 mg) by mouth daily, Disp: 30 tablet, Rfl: 0     atorvastatin (LIPITOR) 20 MG tablet, Take 60 mg by mouth daily , Disp: , Rfl:      calcium citrate-vitamin D (CITRACAL) 315-250 MG-UNIT TABS per tablet, Take 650 mg by mouth 2 times daily , Disp: , Rfl:      cycloSPORINE (RESTASIS) 0.05 % ophthalmic emulsion, Place 1 drop into both eyes 2 times daily , Disp: , Rfl:      dexamethasone (DECADRON) 4 MG tablet, Take 2 tablets (8 mg) by mouth 2 times daily (with meals) for 3 doses Start evening of Docetaxel infusion and continue for a total of 3 doses., Disp: 6 tablet, Rfl: 9     fluconazole (DIFLUCAN) 200 MG tablet, Take 1 tablet (200 mg) by mouth daily, Disp: 60 tablet, Rfl: 1     Glucosamine-Chondroit-Vit C-Mn (GLUCOSAMINE 1500 COMPLEX) CAPS, Take 1 capsule by mouth daily, Disp: , Rfl:      levothyroxine  (SYNTHROID/LEVOTHROID) 112 MCG tablet, Take 112 mcg by mouth daily, Disp: , Rfl:      loratadine (CLARITIN) 10 MG tablet, , Disp: , Rfl:      MAGNESIUM PO, Take 250 mg by mouth 2 times daily , Disp: , Rfl:      MEBENDAZOLE PO, Take 225 mg by mouth daily , Disp: , Rfl:      Multiple Vitamins-Minerals (MULTIVITAMIN ADULT EXTRA C PO), Take 1 tablet by mouth every 24 hours, Disp: , Rfl:      Nutritional Supplements (SALMON OIL) CAPS, Take 2 capsules by mouth daily , Disp: , Rfl:      nystatin (MYCOSTATIN) 438460 UNIT/ML suspension, Take 5 mLs (500,000 Units) by mouth 4 times daily Swish and Swallow, Disp: 200 mL, Rfl: 1     omeprazole (PRILOSEC) 20 MG DR capsule, Take 20 mg by mouth daily, Disp: , Rfl:      predniSONE (DELTASONE) 5 MG tablet, Take 1 tablet (5 mg) by mouth 2 times daily for 21 days, Disp: 42 tablet, Rfl: 0     prochlorperazine (COMPAZINE) 10 MG tablet, Take 1 tablet (10 mg) by mouth every 6 hours as needed for nausea or vomiting (Patient not taking: No sig reported), Disp: 30 tablet, Rfl: 2     prochlorperazine (COMPAZINE) 10 MG tablet, Take 1 tablet (10 mg) by mouth every 6 hours as needed for nausea or vomiting (Patient not taking: No sig reported), Disp: 30 tablet, Rfl: 3     tamsulosin (FLOMAX) 0.4 MG capsule, Take 1 capsule (0.4 mg) by mouth daily, Disp: 30 capsule, Rfl: 3    Physical Exam:   /74   Pulse 71   Temp 98.5  F (36.9  C)   Resp 18   Wt 109.3 kg (241 lb)   SpO2 97%   BMI 33.35 kg/m     General: well appearing, no acute distress, pleasant male   HEENT: normocephalic, atraumatic, PERRLA, sclerae nonicteric. Small amount of oral thrush is present on the tongue.   CV: regular rate and rhythm, no murmurs  Lungs: clear to auscultation bilaterally, no wheezes/rales/rhonchi  Abd: soft, positive bowel sounds, non-distended, non-tender  MSK: full range of motion in all four extremities, no peripheral edema  Neuro: alert and oriented x3, CN grossly intact   Psych: appropriate mood and  "affect  Skin: no rashes or lesions    ECOG PS 1.    LABORATORY DATA:  Most Recent 3 CBC's:  Recent Labs   Lab Test 09/12/22  0933 08/22/22  1036 08/01/22  1113   WBC 5.4 5.9 6.1   HGB 12.8* 12.2* 12.5*   MCV 93 92 92    211 179   ANEUTAUTO 3.4 3.8 4.3     Most Recent 3 BMP's:  Recent Labs   Lab Test 09/12/22  0933 08/22/22  1036 08/01/22  1113    142 143   POTASSIUM 3.9 3.9 4.1   CHLORIDE 105 108 111*   CO2 23 26 24   BUN 12.4 15 15   CR 0.85 0.87 0.88   ANIONGAP 12 8 8   BETHANY 8.9 8.5 8.8   GLC 85 81 115*   PROTTOTAL 6.5 6.8 7.0   ALBUMIN 4.1 3.6 3.7    Most Recent 3 LFT's:  Recent Labs   Lab Test 09/12/22  0933 08/22/22  1036 08/01/22  1113   AST 23 17 13   ALT 21 25 32   ALKPHOS 58 66 80   BILITOTAL 0.6 0.7 0.4    Most Recent 2 TSH and T4:  Recent Labs   Lab Test 07/07/21  0834 03/31/21  0824   TSH 1.67 1.63     PSA and Testosterone 09/12/22: pending.    Latest Reference Range & Units 07/07/21 08:34 08/01/22 11:13 08/22/22 10:36   Testosterone Total 240 - 950 ng/dL 6 (L) <2 (L) <2 (L)   (L): Data is abnormally low     Latest Reference Range & Units 05/05/22 11:14 07/11/22 09:22 08/01/22 11:13 08/22/22 10:36   PSA 0.00 - 4.00 ug/L 1.79 2.16 1.78 1.36     I reviewed the above labs today.    ASSESSMENT & PLAN: Mr. Reyes is a delightful 62 year old gentleman with metastatic castration sensitive prostate adenocarcinoma as well as an incidentally diagnosed stage III clear-cell renal cell carcinoma of the right kidney (s/p radical R nephrectomy 3/19/19), who is here for a follow-up visit and initiation of docetaxel for now metastatic castration-resistant prostate cancer.     1. Metastatic castration-resistant prostate cancer, with involvement of the bones and RPLN:   - Patient met the criteria for ARTUROED \"high-volume\" metastatic hormone-sensitive prostate cancer. He opted for docetaxel in combination with ADT (per JOSEFA, [id number], MOOKIEE). Tolerated 6 cycles of docetaxel fairly well (5/8/19 " through 8/21/19), with the exception of mild fatigue and mild neuropathy.   - Continue leuprolide 22.5mg every 3 months, last dosed on 08/02/22. Will plan to give on 10/25/22 with appointment when Walter returns for cycle 6.   - S/P radiation to  L5/S1 (4/2022) with resolution of low back pain and left leg numbness.   - Obtain blood for CFDNA  - Rising PSA from 0.72 (3/22/22)-->1.79 (5/5/22). He had CT scan and NM Bone Scan done 5/5 which confirmed disease progression. Stopped Xtandi and began Docetaxel for metastatic castration resistant prostate cancer. Tolerated cycle 1 well, IV benadryl as premed without reaction so will keep. His PSA level is responding. pre-docetaxel PSA 2.16. Most recently PSA from 08/22/22 down trending to 1.36. PSA and Testosterone pending today.   -labs and assessment ok to proceed with cycle 4 today.   -Plan for 4-6 cycles of chemotherapy depending on tolerance to treatment. Follow up with Dr. Winters on 10/03/22 as scheduled with infusion after.     2. Bone metastases:   - Noted to have osseous metastatic disease at the time of diagnosis. Last bone scan (5/5/22) showed new focus of increased uptake left superior acetabulum and distal left femoral diaphysis; slight increased uptake in the left first rib; stable increased uptake in the other metastatic foci.   - Encouraged to continue calcium and vitamin D supplementation.  - Will continue Zometa 4mg IV every ~3 months, last dosed on 8/2. Will plan to give on 10/25/22 with appointment when Walter returns for cycle 6.         3. Stage 3, UISS-high risk ccRCC: s/p R nephrectomy   - approximately 40-50% risk of recurrence after definitive surgery.   - no clear evidence of residual disease at this time; the retroperitoneal lymphadenopathy is more consistent with metastatic prostate cancer   - Continue active surveillance with self-reporting for signs/symptoms of recurrence (this was previously explained in detail) and periodic H&P and  scans.    4. Thrush  Recurrent with prednisone use. Fluconazole maintenance 200 mg daily and nystatin swoosh and swallow prn for flares.     35 minutes spent on the date of the encounter doing chart review, review of test results, interpretation of tests, patient visit and documentation            Again, thank you for allowing me to participate in the care of your patient.      Sincerely,    Yamilet Espinoza PA-C

## 2022-09-12 NOTE — PROGRESS NOTES
Infusion Nursing Note:  Walter Reyes presents today for C4D1 Taxotere.    Patient seen by provider today: Yes: Yamilet Espinoza PA-C prior to infusion   present during visit today: Not Applicable.    Note: Walter denied any questions or concerns following his visit with the provider prior to infusion.    Intravenous Access:  Implanted Port.    Treatment Conditions:   Latest Reference Range & Units 09/12/22 09:33   Sodium 136 - 145 mmol/L 140   Potassium 3.4 - 5.3 mmol/L 3.9   Chloride 98 - 107 mmol/L 105   Carbon Dioxide (CO2) 22 - 29 mmol/L 23   Urea Nitrogen 8.0 - 23.0 mg/dL 12.4   Creatinine 0.67 - 1.17 mg/dL 0.85   GFR Estimate >60 mL/min/1.73m2 >90   Calcium 8.8 - 10.2 mg/dL 8.9   Anion Gap 7 - 15 mmol/L 12   Albumin 3.5 - 5.2 g/dL 4.1   Protein Total 6.4 - 8.3 g/dL 6.5   Alkaline Phosphatase 40 - 129 U/L 58   ALT 10 - 50 U/L 21   AST 10 - 50 U/L 23   Bilirubin Total <=1.2 mg/dL 0.6   Glucose 70 - 99 mg/dL 85   WBC 4.0 - 11.0 10e3/uL 5.4   Hemoglobin 13.3 - 17.7 g/dL 12.8 (L)   Hematocrit 40.0 - 53.0 % 39.3 (L)   Platelet Count 150 - 450 10e3/uL 188   RBC Count 4.40 - 5.90 10e6/uL 4.25 (L)   MCV 78 - 100 fL 93   MCH 26.5 - 33.0 pg 30.1   MCHC 31.5 - 36.5 g/dL 32.6   RDW 10.0 - 15.0 % 15.3 (H)   % Neutrophils % 62   % Lymphocytes % 25   % Monocytes % 11   % Eosinophils % 0   % Basophils % 1   Absolute Basophils 0.0 - 0.2 10e3/uL 0.0   Absolute Eosinophils 0.0 - 0.7 10e3/uL 0.0   Absolute Immature Granulocytes <=0.4 10e3/uL 0.0   Absolute Lymphocytes 0.8 - 5.3 10e3/uL 1.4   Absolute Monocytes 0.0 - 1.3 10e3/uL 0.6   % Immature Granulocytes % 1   Absolute Neutrophils 1.6 - 8.3 10e3/uL 3.4   Absolute NRBCs 10e3/uL 0.0   NRBCs per 100 WBC <1 /100 0     Results reviewed, labs MET treatment parameters, ok to proceed with treatment.    Post Infusion Assessment:  Patient tolerated infusion without incident.  Blood return noted pre and post infusion.  Site patent and intact, free from redness, edema  or discomfort.  No evidence of extravasations.  Access discontinued per protocol.     Discharge Plan:   Prescription refills given for decadron and prednisone.  Discharge instructions reviewed with: Patient and Family.  Patient and/or family verbalized understanding of discharge instructions and all questions answered.  Copy of AVS given to patient.  Patient will return 10/3 for next appointment.   Patient discharged in stable condition accompanied by: self and wife.  Departure Mode: Ambulatory.  Face to Face time: 0.      Marti Amezcua RN

## 2022-09-12 NOTE — NURSING NOTE
Chief Complaint   Patient presents with     Port Draw     Power needle. Heparin locked,vitals checked     Karin Burr RN on 9/12/2022 at 9:35 AM

## 2022-09-12 NOTE — NURSING NOTE
"Oncology Rooming Note    September 12, 2022 9:45 AM   Walter Reyes is a 62 year old male who presents for:    Chief Complaint   Patient presents with     Port Draw     Power needle. Heparin locked,vitals checked     Oncology Clinic Visit     Malignant neoplasm of prostate metastatic to bone      Initial Vitals: /74   Pulse 71   Temp 98.5  F (36.9  C)   Resp 18   Wt 109.3 kg (241 lb)   SpO2 97%   BMI 33.35 kg/m   Estimated body mass index is 33.35 kg/m  as calculated from the following:    Height as of 5/9/22: 1.811 m (5' 11.28\").    Weight as of this encounter: 109.3 kg (241 lb). Body surface area is 2.34 meters squared.  No Pain (0) Comment: Data Unavailable   No LMP for male patient.  Allergies reviewed: Yes  Medications reviewed: Yes    Medications: MEDICATION REFILLS NEEDED TODAY. Provider was notified.  Pharmacy name entered into EPIC:    [facility] - [place], [place] - [address] [id number] - [place] - [address] [facility] [place] [place] [facility] [address] [place] [place] [facility] [phone]    Clinical concerns: refill needed on Prednisone and Dexamethasone.        Nohemi Mcleod CMA            "

## 2022-09-14 LAB — TESTOST SERPL-MCNC: 3 NG/DL (ref 240–950)

## 2022-10-03 ENCOUNTER — INFUSION THERAPY VISIT (OUTPATIENT)
Dept: ONCOLOGY | Facility: CLINIC | Age: 62
End: 2022-10-03
Attending: INTERNAL MEDICINE
Payer: COMMERCIAL

## 2022-10-03 ENCOUNTER — APPOINTMENT (OUTPATIENT)
Dept: LAB | Facility: CLINIC | Age: 62
End: 2022-10-03
Attending: INTERNAL MEDICINE
Payer: COMMERCIAL

## 2022-10-03 VITALS
DIASTOLIC BLOOD PRESSURE: 78 MMHG | HEART RATE: 66 BPM | BODY MASS INDEX: 33.7 KG/M2 | OXYGEN SATURATION: 97 % | WEIGHT: 243.5 LBS | SYSTOLIC BLOOD PRESSURE: 117 MMHG | RESPIRATION RATE: 16 BRPM | TEMPERATURE: 98.2 F

## 2022-10-03 DIAGNOSIS — C61 PROSTATE CANCER (H): Primary | ICD-10-CM

## 2022-10-03 DIAGNOSIS — C61 MALIGNANT NEOPLASM OF PROSTATE METASTATIC TO BONE (H): Primary | ICD-10-CM

## 2022-10-03 DIAGNOSIS — B37.0 THRUSH: ICD-10-CM

## 2022-10-03 DIAGNOSIS — C79.51 MALIGNANT NEOPLASM OF PROSTATE METASTATIC TO BONE (H): Primary | ICD-10-CM

## 2022-10-03 LAB
ALBUMIN SERPL BCG-MCNC: 4.1 G/DL (ref 3.5–5.2)
ALP SERPL-CCNC: 50 U/L (ref 40–129)
ALT SERPL W P-5'-P-CCNC: 25 U/L (ref 10–50)
ANION GAP SERPL CALCULATED.3IONS-SCNC: 11 MMOL/L (ref 7–15)
AST SERPL W P-5'-P-CCNC: 21 U/L (ref 10–50)
BASOPHILS # BLD AUTO: 0 10E3/UL (ref 0–0.2)
BASOPHILS NFR BLD AUTO: 0 %
BILIRUB SERPL-MCNC: 0.6 MG/DL
BUN SERPL-MCNC: 13.1 MG/DL (ref 8–23)
CALCIUM SERPL-MCNC: 9.1 MG/DL (ref 8.8–10.2)
CHLORIDE SERPL-SCNC: 103 MMOL/L (ref 98–107)
CREAT SERPL-MCNC: 0.9 MG/DL (ref 0.67–1.17)
DEPRECATED HCO3 PLAS-SCNC: 26 MMOL/L (ref 22–29)
EOSINOPHIL # BLD AUTO: 0 10E3/UL (ref 0–0.7)
EOSINOPHIL NFR BLD AUTO: 0 %
ERYTHROCYTE [DISTWIDTH] IN BLOOD BY AUTOMATED COUNT: 16.2 % (ref 10–15)
GFR SERPL CREATININE-BSD FRML MDRD: >90 ML/MIN/1.73M2
GLUCOSE SERPL-MCNC: 93 MG/DL (ref 70–99)
HCT VFR BLD AUTO: 37.5 % (ref 40–53)
HGB BLD-MCNC: 12.4 G/DL (ref 13.3–17.7)
IMM GRANULOCYTES # BLD: 0 10E3/UL
IMM GRANULOCYTES NFR BLD: 1 %
LYMPHOCYTES # BLD AUTO: 1.6 10E3/UL (ref 0.8–5.3)
LYMPHOCYTES NFR BLD AUTO: 31 %
MCH RBC QN AUTO: 30.2 PG (ref 26.5–33)
MCHC RBC AUTO-ENTMCNC: 33.1 G/DL (ref 31.5–36.5)
MCV RBC AUTO: 92 FL (ref 78–100)
MONOCYTES # BLD AUTO: 0.5 10E3/UL (ref 0–1.3)
MONOCYTES NFR BLD AUTO: 10 %
NEUTROPHILS # BLD AUTO: 2.9 10E3/UL (ref 1.6–8.3)
NEUTROPHILS NFR BLD AUTO: 58 %
NRBC # BLD AUTO: 0 10E3/UL
NRBC BLD AUTO-RTO: 0 /100
PLATELET # BLD AUTO: 198 10E3/UL (ref 150–450)
POTASSIUM SERPL-SCNC: 4 MMOL/L (ref 3.4–5.3)
PROT SERPL-MCNC: 6.5 G/DL (ref 6.4–8.3)
PSA SERPL-MCNC: 0.95 NG/ML (ref 0–4.5)
RBC # BLD AUTO: 4.1 10E6/UL (ref 4.4–5.9)
SODIUM SERPL-SCNC: 140 MMOL/L (ref 136–145)
WBC # BLD AUTO: 5 10E3/UL (ref 4–11)

## 2022-10-03 PROCEDURE — 96413 CHEMO IV INFUSION 1 HR: CPT

## 2022-10-03 PROCEDURE — 96375 TX/PRO/DX INJ NEW DRUG ADDON: CPT

## 2022-10-03 PROCEDURE — G0463 HOSPITAL OUTPT CLINIC VISIT: HCPCS

## 2022-10-03 PROCEDURE — 85025 COMPLETE CBC W/AUTO DIFF WBC: CPT

## 2022-10-03 PROCEDURE — 250N000011 HC RX IP 250 OP 636: Performed by: INTERNAL MEDICINE

## 2022-10-03 PROCEDURE — 99214 OFFICE O/P EST MOD 30 MIN: CPT | Performed by: INTERNAL MEDICINE

## 2022-10-03 PROCEDURE — 84403 ASSAY OF TOTAL TESTOSTERONE: CPT

## 2022-10-03 PROCEDURE — 80053 COMPREHEN METABOLIC PANEL: CPT

## 2022-10-03 PROCEDURE — 258N000003 HC RX IP 258 OP 636: Performed by: INTERNAL MEDICINE

## 2022-10-03 PROCEDURE — 84153 ASSAY OF PSA TOTAL: CPT

## 2022-10-03 PROCEDURE — 36415 COLL VENOUS BLD VENIPUNCTURE: CPT

## 2022-10-03 RX ORDER — ALBUTEROL SULFATE 0.83 MG/ML
2.5 SOLUTION RESPIRATORY (INHALATION)
Status: CANCELLED | OUTPATIENT
Start: 2022-10-03

## 2022-10-03 RX ORDER — HEPARIN SODIUM (PORCINE) LOCK FLUSH IV SOLN 100 UNIT/ML 100 UNIT/ML
500 SOLUTION INTRAVENOUS ONCE
Status: COMPLETED | OUTPATIENT
Start: 2022-10-03 | End: 2022-10-03

## 2022-10-03 RX ORDER — NYSTATIN 100000/ML
500000 SUSPENSION, ORAL (FINAL DOSE FORM) ORAL 4 TIMES DAILY
Qty: 200 ML | Refills: 1 | Status: SHIPPED | OUTPATIENT
Start: 2022-10-03 | End: 2022-10-24

## 2022-10-03 RX ORDER — NALOXONE HYDROCHLORIDE 0.4 MG/ML
0.2 INJECTION, SOLUTION INTRAMUSCULAR; INTRAVENOUS; SUBCUTANEOUS
Status: CANCELLED | OUTPATIENT
Start: 2022-10-03

## 2022-10-03 RX ORDER — PREDNISONE 5 MG/1
5 TABLET ORAL 2 TIMES DAILY
Qty: 42 TABLET | Refills: 0 | Status: SHIPPED | OUTPATIENT
Start: 2022-10-03 | End: 2022-10-24

## 2022-10-03 RX ORDER — DIPHENHYDRAMINE HYDROCHLORIDE 50 MG/ML
50 INJECTION INTRAMUSCULAR; INTRAVENOUS
Status: CANCELLED
Start: 2022-10-03

## 2022-10-03 RX ORDER — MEPERIDINE HYDROCHLORIDE 25 MG/ML
25 INJECTION INTRAMUSCULAR; INTRAVENOUS; SUBCUTANEOUS EVERY 30 MIN PRN
Status: CANCELLED | OUTPATIENT
Start: 2022-10-03

## 2022-10-03 RX ORDER — METHYLPREDNISOLONE SODIUM SUCCINATE 125 MG/2ML
125 INJECTION, POWDER, LYOPHILIZED, FOR SOLUTION INTRAMUSCULAR; INTRAVENOUS
Status: CANCELLED
Start: 2022-10-03

## 2022-10-03 RX ORDER — ALBUTEROL SULFATE 90 UG/1
1-2 AEROSOL, METERED RESPIRATORY (INHALATION)
Status: CANCELLED
Start: 2022-10-03

## 2022-10-03 RX ORDER — EPINEPHRINE 1 MG/ML
0.3 INJECTION, SOLUTION INTRAMUSCULAR; SUBCUTANEOUS EVERY 5 MIN PRN
Status: CANCELLED | OUTPATIENT
Start: 2022-10-03

## 2022-10-03 RX ORDER — HEPARIN SODIUM (PORCINE) LOCK FLUSH IV SOLN 100 UNIT/ML 100 UNIT/ML
5 SOLUTION INTRAVENOUS
Status: DISCONTINUED | OUTPATIENT
Start: 2022-10-03 | End: 2022-10-03 | Stop reason: HOSPADM

## 2022-10-03 RX ORDER — LORAZEPAM 2 MG/ML
0.5 INJECTION INTRAMUSCULAR EVERY 4 HOURS PRN
Status: CANCELLED | OUTPATIENT
Start: 2022-10-03

## 2022-10-03 RX ORDER — HEPARIN SODIUM,PORCINE 10 UNIT/ML
5 VIAL (ML) INTRAVENOUS
Status: CANCELLED | OUTPATIENT
Start: 2022-10-03

## 2022-10-03 RX ORDER — HEPARIN SODIUM (PORCINE) LOCK FLUSH IV SOLN 100 UNIT/ML 100 UNIT/ML
5 SOLUTION INTRAVENOUS
Status: CANCELLED | OUTPATIENT
Start: 2022-10-03

## 2022-10-03 RX ADMIN — Medication 500 UNITS: at 08:05

## 2022-10-03 RX ADMIN — SODIUM CHLORIDE 250 ML: 9 INJECTION, SOLUTION INTRAVENOUS at 10:08

## 2022-10-03 RX ADMIN — Medication 5 ML: at 11:53

## 2022-10-03 RX ADMIN — DOCETAXEL 172 MG: 20 INJECTION, SOLUTION, CONCENTRATE INTRAVENOUS at 10:50

## 2022-10-03 RX ADMIN — DEXAMETHASONE SODIUM PHOSPHATE: 10 INJECTION, SOLUTION INTRAMUSCULAR; INTRAVENOUS at 10:28

## 2022-10-03 RX ADMIN — DIPHENHYDRAMINE HYDROCHLORIDE 50 MG: 50 INJECTION, SOLUTION INTRAMUSCULAR; INTRAVENOUS at 10:08

## 2022-10-03 ASSESSMENT — PAIN SCALES - GENERAL: PAINLEVEL: NO PAIN (0)

## 2022-10-03 NOTE — PATIENT INSTRUCTIONS
[facility] Triage and after hours / weekends / holidays:  [phone]    Please call the triage or after hours line if you experience a temperature greater than or equal to 100.4, shaking chills, have uncontrolled nausea, vomiting and/or diarrhea, dizziness, shortness of breath, chest pain, bleeding, unexplained bruising, or if you have any other new/concerning symptoms, questions or concerns.      If you are having any concerning symptoms or wish to speak to a provider before your next infusion visit, please call your care coordinator or triage to notify them so we can adequately serve you.     If you need a refill on a narcotic prescription or other medication, please call before your infusion appointment.

## 2022-10-03 NOTE — PROGRESS NOTES
Infusion Nursing Note:  Walter Reyes presents today for Cycle 5 Day 1 docetaxel.    Patient seen by provider today: Yes: Dr. Winters   present during visit today: Not Applicable.    Note: Walter presents today feeling well. Denies pain or nausea/vomiting. Offers no concerns since visit with Dr. Winters prior to infusion.    Intravenous Access:  Implanted Port.    Treatment Conditions:     Latest Reference Range & Units 10/03/22 08:11   Sodium 136 - 145 mmol/L 140   Potassium 3.4 - 5.3 mmol/L 4.0   Chloride 98 - 107 mmol/L 103   Carbon Dioxide (CO2) 22 - 29 mmol/L 26   Urea Nitrogen 8.0 - 23.0 mg/dL 13.1   Creatinine 0.67 - 1.17 mg/dL 0.90   GFR Estimate >60 mL/min/1.73m2 >90   Calcium 8.8 - 10.2 mg/dL 9.1   Anion Gap 7 - 15 mmol/L 11   Albumin 3.5 - 5.2 g/dL 4.1   Protein Total 6.4 - 8.3 g/dL 6.5   Alkaline Phosphatase 40 - 129 U/L 50   ALT 10 - 50 U/L 25   AST 10 - 50 U/L 21   Bilirubin Total <=1.2 mg/dL 0.6   Glucose 70 - 99 mg/dL 93   PSA Tumor Marker 0.00 - 4.50 ng/mL 0.95   WBC 4.0 - 11.0 10e3/uL 5.0   Hemoglobin 13.3 - 17.7 g/dL 12.4 (L)   Hematocrit 40.0 - 53.0 % 37.5 (L)   Platelet Count 150 - 450 10e3/uL 198   RBC Count 4.40 - 5.90 10e6/uL 4.10 (L)   MCV 78 - 100 fL 92   MCH 26.5 - 33.0 pg 30.2   MCHC 31.5 - 36.5 g/dL 33.1   RDW 10.0 - 15.0 % 16.2 (H)   % Neutrophils % 58   % Lymphocytes % 31   % Monocytes % 10   % Eosinophils % 0   % Basophils % 0   Absolute Basophils 0.0 - 0.2 10e3/uL 0.0   Absolute Eosinophils 0.0 - 0.7 10e3/uL 0.0   Absolute Immature Granulocytes <=0.4 10e3/uL 0.0   Absolute Lymphocytes 0.8 - 5.3 10e3/uL 1.6   Absolute Monocytes 0.0 - 1.3 10e3/uL 0.5   % Immature Granulocytes % 1   Absolute Neutrophils 1.6 - 8.3 10e3/uL 2.9   Absolute NRBCs 10e3/uL 0.0   NRBCs per 100 WBC <1 /100 0     Results reviewed, labs MET treatment parameters, ok to proceed with treatment.    Post Infusion Assessment:  Patient tolerated infusion without incident.  Blood return noted pre and post  infusion.  Site patent and intact, free from redness, edema or discomfort.  No evidence of extravasations.  Access discontinued per protocol.     Discharge Plan:   Prescription refills given for prednisone, decadron.  Discharge instructions reviewed with: Patient and Family.  Patient and/or family verbalized understanding of discharge instructions and all questions answered.  Copy of AVS reviewed with patient and/or family.  Patient will return 10/24 for next infusion appointment.  Patient discharged in stable condition accompanied by: wife.  Departure Mode: Ambulatory.      Aisha Kelly RN

## 2022-10-03 NOTE — NURSING NOTE
"Chief Complaint   Patient presents with     Port Draw     Labs drawn via port by RN. Vitals taken.     Port accessed with 20G 3/4\" flat needle by RN, labs collected, line flushed with saline and heparin.  Vitals taken. Pt checked in for appointment(s).    Angella Erazo RN    "

## 2022-10-03 NOTE — LETTER
"    10/3/2022         RE: Walter Reyes  [address] [facility] [address]        Dear Colleague,    Thank you for referring your patient, Walter Reyes, to the [facility]. Please see a copy of my visit note below.      [facility] Oncology Followup  Oncologist: Dr. Jj Winters  Oct 3, 2022    REASON FOR VISIT: Follow-up for metastatic castration-resistant prostate cancer, here for cycle 4 Docetaxel    HISTORY OF PRESENT ILLNESS: Mr. 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 His oncologic history is detailed below.     1/6/21  PSA = 0.29  3/31/21 PSA = 0.44  7/7/21  PSA = 0.47  11/2021 PSA = 1.05  -- Start XTANDI  12/16/21 PSA =0.22  2/11/22 PSA= 0.50  3/22/22 PSA=0.72  5/5/2022 PSA=1.79  7/11/2022 PSA=2.16 --Start cycle 1 docetaxel   8/22/22 PSA = 1.36  9/12/22 PSA = 1.09    Interval History:.     ONCOLOGIC HISTORY:  1. 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 Naylor at [facility]. Prior PSA were 1.4 on 8/16/17, 2.4 on 6/28/16, 2.9 on 6/28/16, and as low as 0.4 on 3/26/2003.   - 1/08/2019: Consultation with Sarah Wilson CNP in Urology clinic. Repeat PSA 9.6.  - 1/16/2019: MRI prostate with contrast - \"This examination is characterized as PIRADS 5- very high probability. Clinically significant cancer is highly likely to be present. There is a large, invasive mass arising from the right peripheral zone and extending into the neurovascular bundle, seminal vesicle, and along the anterolateral right mesorectal fascia. Metastatic right external iliac lymph node. Metastatic lesion in the posterior/superior right acetabulum.\"  - " "1/25/2019: CT abdomen and pelvis with IV contrast - \"1. Heterogeneous enhancing mass posteriorly in the upper pole of the right kidney measures 4.5 x 5.8 x 5.7 cm (AP by transverse by craniocaudal). It has a small nodular component extending posterior medially which abuts the right psoas muscle. This nodular extension measures 2.1 x 2.1 cm. Minimal stranding about the mass. No definite thrombus within the right renal vein. This renal mass is compatible with a renal cell carcinoma. A paraaortic lymph node situated immediately posterior to the left renal vein measures 1.1 cm in short axis, suspicious for metastasis. 2. A 2 cm right external iliac lymph node is also suspicious for metastases. 3. Multiple sclerotic osseous lesions suspicious for metastases. These include 0.8 and 0.6 cm sclerotic lesions laterally in the right iliac wing (images 53 and 62 respectively). Ill-defined groundglass density laterally in the right acetabulum measuring 1.7 x 1.2 cm corresponds with the lesion identified on MRI. A 0.4 cm groundglass density in the left acetabulum. Sclerotic lesion in the left femoral neck measures 0.9 x 1.6 cm (image 81). Sclerotic metastases would be more compatible with prostate metastases. 4. A 3 subcentimeter hepatic lesions are indeterminate. Metastases would be a consideration. These can be further characterized with liver MRI.\"  - 1/25/2019: NM bone scan - \"There is focal bony uptake in the left femoral neck, right acetabulum, right of midline at the S1 or L5 level of the spine, within multiple bilateral ribs, in the C7 or T1 level of the spine, and anteriorly within the skull. Findings are suspicious for metastases.\"  - 1/31/19: CT chest with contrast - \" No suspicious nodules in the chest.  Stable appearance of a 5.8 cm right renal mass concerning for renal carcinoma until proven otherwise.  Stable indeterminant subcentimeter hypodensities in the liver.  Multifocal osteoblastic metastasis including 2.5 " "cm lesion in the right fifth rib posteriorly and a 1.6 cm lesion in the right third rib posteriorly. No lytic lesions identified.\"  - 2/6/19: CT-guided right sclerotic fifth rib lesion biopsy - \"Metastatic carcinoma, consistent with prostate primary.  Immunohistochemical stains performed show the metastatic carcinoma stains positive for NKX3.1 (prostate marker), negative for STACIE 3 and PAX8, which supports the above diagnosis.\"  - 2/15/19: Started Casodex 50mg every day and consented for the biobanking protocol. 3/18/19 - stopped Casodex.  - 2/19/19: Case discussed in tumor board - recommendation for nephectomy for suspected malignant right renal mass.   - 2/26/19: Started Lupron 22.5mg every 3 months.   - 5/8/19: Started Docetaxel 75mg/m2 IV every 3 weeks. 06/18/19 - cycle 3, 7/10/19 - cycle 4, 07/31/19 - cycle 5, 08/21/19 - cycle 6.   - 10/2/19: Restaging CT CAP and NM Bone Scan showed improved osseous disease, no evidence of recurrent or new metastatic disease.  - 1/5/20: Restaging CT CAP and NM Bone Scan showed stable osseous disease, no evidence of recurrent or new metastatic disease.  - 4/6/20: NM bone scan with slightly improved uptake in known skeletal mets. CT C/A/P with contrast with stable osseous mets, no yusuf enlargement, no new visceral mets etc.  - 04/08/20: Zometa every 3 months.  - 10/5/20: CT C/A/P with contrast and NM bone scan - SD.   - 1/4/21: CT C/A/P with contrast and NM bone scan - SD but slight increase in right 5th rib tracer uptake and in posterior L5 sclerosis.   - 03/29/21: CT C/A/P with contrast - single new right sacral 8mm bone lesion (suspicious), other bone mets stable. No visceral/yusuf disease. Nephrectomy site without recurrence. NM bone scan - SD but \"increased uptake associated with the prior lesions of the lower  cervical spine, posterior right fourth rib and right L5 posterior arch elements. Otherwise unchanged uptake associated with the proximal left femur and left anterior " "fourth rib. Similar uptake of the left halux, possibly secondary to degenerative osteoarthritis or gout.\"  -  START XTANDI  -  RT to L5 / S1  -  STOPPED XTANDI   -07/11/2022  START Cycle 1 Docetaxel     2. Stage III ([id number]), grade 3 of 4, clear-cell RCC of right kidney:  - Incidentally diagnosed as above.   - Underwent curative-intent robotic right radical nephrectomy with Dr. Wesley Cleveland on 3/19/19. No tumor spillage per op report.   - Path showed: \"Histologic Type: Clear cell renal cell carcinoma; Sarcomatoid Features: Not identified; Histologic Grade: Nucleolar grade 3 (WHO/ISUP). Extent Tumor Size: 4.3 cm. Microscopic Tumor Extension: Tumor extension into renal sinus (in vascular structures). Margins: Negative. Tumor Necrosis: Present; focal. Lymph-Vascular Invasion: Not identified.  Pathologic Staging (pTNM) Primary Tumor (pT): pT3a: Tumor extends into renal vein branches/renal sinus. Regional Lymph Nodes (pN): pNX. Number of Lymph Nodes Examined: 0 Distant Metastasis (pM): pM N/A.\"  - Restaging scans as above.    INTERVAL HISTORY:   Walter presents for oncology follow-up visit today prior to Cycle 5 Docetaxel. Doing well overall. Mild loss in anal tone no diarrhea.   Hair loss. No nausea. Mild neuropathy. Mild drop in bp / lightheadedness after the chemotherapy.    ROS is otherwise negative.     PAST MEDICAL HISTORY:  Past Medical History:   Diagnosis Date     Cancer of kidney, right (H)      Complication of anesthesia     slow wakeup      Malignant neoplasm of prostate metastatic to bone (H) 2/14/2019     Thyroid disease      PAST SURGICAL HISTORY:   Past Surgical History:   Procedure Laterality Date     CYSTOSCOPY      about 10 years ago     INSERT PORT VASCULAR ACCESS Right 5/2/2019    Procedure: Chest Port Placement;  Surgeon: Tate Burciaga PA-C;  Location: [facility] OR     IR CHEST PORT PLACEMENT > 5 YRS OF AGE  5/2/2019     LAPAROSCOPIC NEPHRECTOMY Right 3/19/2019    Procedure: " Right laparoscopic radical nephrectomy;  Surgeon: Wesley Cleveland MD;  Location: [facility] OR      SOCIAL HISTORY:   Social History     Tobacco Use     Smoking status: Never Smoker     Smokeless tobacco: Never Used   Substance Use Topics     Drug use: No     FAMILY HISTORY:   Family History   Problem Relation Age of Onset     Diabetes Father      ALLERGIES:   No Known Allergies  CURRENT MEDICATIONS:   Current Outpatient Medications:      amLODIPine (NORVASC) 10 MG tablet, Take 1 tablet (10 mg) by mouth daily, Disp: 30 tablet, Rfl: 0     atorvastatin (LIPITOR) 20 MG tablet, Take 60 mg by mouth daily , Disp: , Rfl:      Barberry-Oreg Grape-Goldenseal (BERBERINE COMPLEX PO), , Disp: , Rfl:      calcium citrate-vitamin D (CITRACAL) 315-250 MG-UNIT TABS per tablet, Take 650 mg by mouth 2 times daily , Disp: , Rfl:      cycloSPORINE (RESTASIS) 0.05 % ophthalmic emulsion, Place 1 drop into both eyes 2 times daily , Disp: , Rfl:      dexamethasone (DECADRON) 4 MG tablet, Take 2 tablets (8 mg) by mouth 2 times daily (with meals) Start evening of Docetaxel infusion and continue for a total of 3 doses., Disp: 6 tablet, Rfl: 9     fluconazole (DIFLUCAN) 200 MG tablet, Take 1 tablet (200 mg) by mouth daily, Disp: 60 tablet, Rfl: 1     Glucosamine-Chondroit-Vit C-Mn (GLUCOSAMINE 1500 COMPLEX) CAPS, Take 1 capsule by mouth daily, Disp: , Rfl:      levothyroxine (SYNTHROID/LEVOTHROID) 112 MCG tablet, Take 112 mcg by mouth daily, Disp: , Rfl:      loratadine (CLARITIN) 10 MG tablet, , Disp: , Rfl:      MAGNESIUM PO, Take 250 mg by mouth 2 times daily , Disp: , Rfl:      MEBENDAZOLE PO, Take 225 mg by mouth daily , Disp: , Rfl:      metFORMIN (GLUCOPHAGE XR) 500 MG 24 hr tablet, Take 1 tablet with breakfast, 2 tablets with dinner., Disp: , Rfl:      Multiple Vitamins-Minerals (MULTIVITAMIN ADULT EXTRA C PO), Take 1 tablet by mouth every 24 hours, Disp: , Rfl:      Nutritional Supplements (SALMON OIL) CAPS, Take 2 capsules by  mouth daily , Disp: , Rfl:      omeprazole (PRILOSEC) 20 MG DR capsule, Take 20 mg by mouth daily, Disp: , Rfl:      predniSONE (DELTASONE) 5 MG tablet, Take 1 tablet (5 mg) by mouth 2 times daily, Disp: 42 tablet, Rfl: 0     predniSONE (DELTASONE) 5 MG tablet, Take 1 tablet (5 mg) by mouth 2 times daily for 21 days, Disp: 42 tablet, Rfl: 0     tamsulosin (FLOMAX) 0.4 MG capsule, Take 1 capsule (0.4 mg) by mouth daily, Disp: 30 capsule, Rfl: 3     nystatin (MYCOSTATIN) 888377 UNIT/ML suspension, Take 5 mLs (500,000 Units) by mouth 4 times daily Swish and Swallow (Patient not taking: No sig reported), Disp: 200 mL, Rfl: 1     prochlorperazine (COMPAZINE) 10 MG tablet, Take 1 tablet (10 mg) by mouth every 6 hours as needed for nausea or vomiting (Patient not taking: No sig reported), Disp: 30 tablet, Rfl: 2     prochlorperazine (COMPAZINE) 10 MG tablet, Take 1 tablet (10 mg) by mouth every 6 hours as needed for nausea or vomiting (Patient not taking: No sig reported), Disp: 30 tablet, Rfl: 3  No current facility-administered medications for this visit.    Physical Exam:   /78 (BP Location: Right arm)   Pulse 66   Temp 98.2  F (36.8  C) (Oral)   Resp 16   Wt 110.5 kg (243 lb 8 oz)   SpO2 97%   BMI 33.70 kg/m     General: well appearing, no acute distress, pleasant male   HEENT: normocephalic, atraumatic, PERRLA, sclerae nonicteric. Small amount of oral thrush is present on the tongue.   CV: regular rate and rhythm, no murmurs  Lungs: clear to auscultation bilaterally, no wheezes/rales/rhonchi  Abd: soft, positive bowel sounds, non-distended, non-tender  MSK: full range of motion in all four extremities, no peripheral edema  Neuro: alert and oriented x3, CN grossly intact   Psych: appropriate mood and affect  Skin: no rashes or lesions  . Mild decrease in anal tone. No masses.    ECOG PS 1.    LABORATORY DATA:  Most Recent 3 CBC's:  Recent Labs   Lab Test 10/03/22  0811 09/12/22  0933 08/22/22  1036   WBC  "5.0 5.4 5.9   HGB 12.4* 12.8* 12.2*   MCV 92 93 92    188 211   ANEUTAUTO 2.9 3.4 3.8     Most Recent 3 BMP's:  Recent Labs   Lab Test 09/12/22  0933 08/22/22  1036 08/01/22  1113    142 143   POTASSIUM 3.9 3.9 4.1   CHLORIDE 105 108 111*   CO2 23 26 24   BUN 12.4 15 15   CR 0.85 0.87 0.88   ANIONGAP 12 8 8   BETHANY 8.9 8.5 8.8   GLC 85 81 115*   PROTTOTAL 6.5 6.8 7.0   ALBUMIN 4.1 3.6 3.7    Most Recent 3 LFT's:  Recent Labs   Lab Test 09/12/22  0933 08/22/22  1036 08/01/22  1113   AST 23 17 13   ALT 21 25 32   ALKPHOS 58 66 80   BILITOTAL 0.6 0.7 0.4    Most Recent 2 TSH and T4:  Recent Labs   Lab Test 07/07/21  0834 03/31/21  0824   TSH 1.67 1.63     PSA and Testosterone 09/12/22: pending.    Latest Reference Range & Units 07/07/21 08:34 08/01/22 11:13 08/22/22 10:36   Testosterone Total 240 - 950 ng/dL 6 (L) <2 (L) <2 (L)   (L): Data is abnormally low     Latest Reference Range & Units 05/05/22 11:14 07/11/22 09:22 08/01/22 11:13 08/22/22 10:36   PSA 0.00 - 4.00 ug/L 1.79 2.16 1.78 1.36     I reviewed the above labs today.    ASSESSMENT & PLAN: Mr. Reyes is a delightful 62 year old gentleman with metastatic castration sensitive prostate adenocarcinoma as well as an incidentally diagnosed stage III clear-cell renal cell carcinoma of the right kidney (s/p radical R nephrectomy 3/19/19), who is here for a follow-up visit and initiation of docetaxel for now metastatic castration-resistant prostate cancer.     1. Metastatic castration-resistant prostate cancer, with involvement of the bones and RPLN:   - Patient met the criteria for CHAARTED \"high-volume\" metastatic hormone-sensitive prostate cancer. He opted for docetaxel in combination with ADT (per JOSEFA, [id number], KARLA). Tolerated 6 cycles of docetaxel fairly well (5/8/19 through 8/21/19), with the exception of mild fatigue and mild neuropathy.   - Continue leuprolide 22.5mg every 3 months, last dosed on 08/02/22. Will plan to give on " 10/25/22 with appointment when Walter returns for cycle 6.   - S/P radiation to  L5/S1 (4/2022) with resolution of low back pain and left leg numbness.   - Obtain blood for CFDNA  - Rising PSA from 0.72 (3/22/22)-->1.79 (5/5/22). He had CT scan and NM Bone Scan done 5/5 which confirmed disease progression. Stopped Xtandi and began Docetaxel for metastatic castration resistant prostate cancer. Tolerated cycle 1 well, IV benadryl as premed without reaction so will keep. His PSA level is responding. pre-docetaxel PSA 2.16. Most recently PSA from 08/22/22 down trending to 1.36. PSA and Testosterone pending today. andrés Watson has completed 4 cycles of chemotherapy which she has tolerated reasonably well.  The only complications to date have included thrush and mild hypotensive episodes that occur after the infusion.  He has mild decrease in anal tone.     We are going to proceed with the fifth and 6 cycle of the chemotherapy and then I am going to restage him with a PSMA PET scan.  The PSMA PET scan will determine if there is growth out of proportion to the PSA decline as well as determine if there is PSMA avid disease that is amenable to future treatment with lutetium.    We discussed those plans in detail and questions were answered.  We will reconvene in mid November after the next 2 cycles have been completed and the scans have been done.        2. Bone metastases:   - Noted to have osseous metastatic disease at the time of diagnosis. Last bone scan (5/5/22) showed new focus of increased uptake left superior acetabulum and distal left femoral diaphysis; slight increased uptake in the left first rib; stable increased uptake in the other metastatic foci.   - Encouraged to continue calcium and vitamin D supplementation.  - Will continue Zometa 4mg IV every ~3 months, last dosed on 8/2. Will plan to give on 10/25/22 with appointment when Walter returns for cycle 6.         3. Stage 3, UISS-high risk ccRCC: s/p R  nephrectomy   - approximately 40-50% risk of recurrence after definitive surgery.   - no clear evidence of residual disease at this time; the retroperitoneal lymphadenopathy is more consistent with metastatic prostate cancer   - Continue active surveillance with self-reporting for signs/symptoms of recurrence (this was previously explained in detail) and periodic H&P and scans.    4. Thrush  Recurrent with prednisone use. Fluconazole maintenance 200 mg daily and nystatin swoosh and swallow prn for flares.           Again, thank you for allowing me to participate in the care of your patient.      Sincerely,    Jj Winters MD

## 2022-10-03 NOTE — PROGRESS NOTES
"  [facility] Oncology Followup  Oncologist: Dr. Jj Winters  Oct 3, 2022    REASON FOR VISIT: Follow-up for metastatic castration-resistant prostate cancer, here for cycle 4 Docetaxel    HISTORY OF PRESENT ILLNESS: Mr. 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 His oncologic history is detailed below.     1/6/21  PSA = 0.29  3/31/21 PSA = 0.44  7/7/21  PSA = 0.47  11/2021 PSA = 1.05  -- Start XTANDI  12/16/21 PSA =0.22  2/11/22 PSA= 0.50  3/22/22 PSA=0.72  5/5/2022 PSA=1.79  7/11/2022 PSA=2.16 --Start cycle 1 docetaxel   8/22/22 PSA = 1.36  9/12/22 PSA = 1.09    Interval History:.     ONCOLOGIC HISTORY:  1. 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 Naylor at [facility]. Prior PSA were 1.4 on 8/16/17, 2.4 on 6/28/16, 2.9 on 6/28/16, and as low as 0.4 on 3/26/2003.   - 1/08/2019: Consultation with Sarah Wilson CNP in Urology clinic. Repeat PSA 9.6.  - 1/16/2019: MRI prostate with contrast - \"This examination is characterized as PIRADS 5- very high probability. Clinically significant cancer is highly likely to be present. There is a large, invasive mass arising from the right peripheral zone and extending into the neurovascular bundle, seminal vesicle, and along the anterolateral right mesorectal fascia. Metastatic right external iliac lymph node. Metastatic lesion in the posterior/superior right acetabulum.\"  - 1/25/2019: CT abdomen and pelvis with IV contrast - \"1. Heterogeneous enhancing mass posteriorly in the upper pole of the right kidney measures 4.5 x 5.8 x 5.7 cm (AP by transverse by craniocaudal). It has a small nodular component extending posterior medially which abuts the " "right psoas muscle. This nodular extension measures 2.1 x 2.1 cm. Minimal stranding about the mass. No definite thrombus within the right renal vein. This renal mass is compatible with a renal cell carcinoma. A paraaortic lymph node situated immediately posterior to the left renal vein measures 1.1 cm in short axis, suspicious for metastasis. 2. A 2 cm right external iliac lymph node is also suspicious for metastases. 3. Multiple sclerotic osseous lesions suspicious for metastases. These include 0.8 and 0.6 cm sclerotic lesions laterally in the right iliac wing (images 53 and 62 respectively). Ill-defined groundglass density laterally in the right acetabulum measuring 1.7 x 1.2 cm corresponds with the lesion identified on MRI. A 0.4 cm groundglass density in the left acetabulum. Sclerotic lesion in the left femoral neck measures 0.9 x 1.6 cm (image 81). Sclerotic metastases would be more compatible with prostate metastases. 4. A 3 subcentimeter hepatic lesions are indeterminate. Metastases would be a consideration. These can be further characterized with liver MRI.\"  - 1/25/2019: NM bone scan - \"There is focal bony uptake in the left femoral neck, right acetabulum, right of midline at the S1 or L5 level of the spine, within multiple bilateral ribs, in the C7 or T1 level of the spine, and anteriorly within the skull. Findings are suspicious for metastases.\"  - 1/31/19: CT chest with contrast - \" No suspicious nodules in the chest.  Stable appearance of a 5.8 cm right renal mass concerning for renal carcinoma until proven otherwise.  Stable indeterminant subcentimeter hypodensities in the liver.  Multifocal osteoblastic metastasis including 2.5 cm lesion in the right fifth rib posteriorly and a 1.6 cm lesion in the right third rib posteriorly. No lytic lesions identified.\"  - 2/6/19: CT-guided right sclerotic fifth rib lesion biopsy - \"Metastatic carcinoma, consistent with prostate primary.  Immunohistochemical stains " "performed show the metastatic carcinoma stains positive for NKX3.1 (prostate marker), negative for STACIE 3 and PAX8, which supports the above diagnosis.\"  - 2/15/19: Started Casodex 50mg every day and consented for the biobanking protocol. 3/18/19 - stopped Casodex.  - 2/19/19: Case discussed in tumor board - recommendation for nephectomy for suspected malignant right renal mass.   - 2/26/19: Started Lupron 22.5mg every 3 months.   - 5/8/19: Started Docetaxel 75mg/m2 IV every 3 weeks. 06/18/19 - cycle 3, 7/10/19 - cycle 4, 07/31/19 - cycle 5, 08/21/19 - cycle 6.   - 10/2/19: Restaging CT CAP and NM Bone Scan showed improved osseous disease, no evidence of recurrent or new metastatic disease.  - 1/5/20: Restaging CT CAP and NM Bone Scan showed stable osseous disease, no evidence of recurrent or new metastatic disease.  - 4/6/20: NM bone scan with slightly improved uptake in known skeletal mets. CT C/A/P with contrast with stable osseous mets, no yusuf enlargement, no new visceral mets etc.  - 04/08/20: Zometa every 3 months.  - 10/5/20: CT C/A/P with contrast and NM bone scan - SD.   - 1/4/21: CT C/A/P with contrast and NM bone scan - SD but slight increase in right 5th rib tracer uptake and in posterior L5 sclerosis.   - 03/29/21: CT C/A/P with contrast - single new right sacral 8mm bone lesion (suspicious), other bone mets stable. No visceral/yusuf disease. Nephrectomy site without recurrence. NM bone scan - SD but \"increased uptake associated with the prior lesions of the lower  cervical spine, posterior right fourth rib and right L5 posterior arch elements. Otherwise unchanged uptake associated with the proximal left femur and left anterior fourth rib. Similar uptake of the left halux, possibly secondary to degenerative osteoarthritis or gout.\"  -  START XTANDI  -  RT to L5 / S1  -  STOPPED XTANDI   -07/11/2022  START Cycle 1 Docetaxel     2. Stage III ([id number]), grade 3 of 4, clear-cell RCC " "of right kidney:  - Incidentally diagnosed as above.   - Underwent curative-intent robotic right radical nephrectomy with Dr. Wesley Cleveland on 3/19/19. No tumor spillage per op report.   - Path showed: \"Histologic Type: Clear cell renal cell carcinoma; Sarcomatoid Features: Not identified; Histologic Grade: Nucleolar grade 3 (WHO/ISUP). Extent Tumor Size: 4.3 cm. Microscopic Tumor Extension: Tumor extension into renal sinus (in vascular structures). Margins: Negative. Tumor Necrosis: Present; focal. Lymph-Vascular Invasion: Not identified.  Pathologic Staging (pTNM) Primary Tumor (pT): pT3a: Tumor extends into renal vein branches/renal sinus. Regional Lymph Nodes (pN): pNX. Number of Lymph Nodes Examined: 0 Distant Metastasis (pM): pM N/A.\"  - Restaging scans as above.    INTERVAL HISTORY:   Walter presents for oncology follow-up visit today prior to Cycle 5 Docetaxel. Doing well overall. Mild loss in anal tone no diarrhea.   Hair loss. No nausea. Mild neuropathy. Mild drop in bp / lightheadedness after the chemotherapy.    ROS is otherwise negative.     PAST MEDICAL HISTORY:  Past Medical History:   Diagnosis Date     Cancer of kidney, right (H)      Complication of anesthesia     slow wakeup      Malignant neoplasm of prostate metastatic to bone (H) 2/14/2019     Thyroid disease      PAST SURGICAL HISTORY:   Past Surgical History:   Procedure Laterality Date     CYSTOSCOPY      about 10 years ago     INSERT PORT VASCULAR ACCESS Right 5/2/2019    Procedure: Chest Port Placement;  Surgeon: Tate Burciaga PA-C;  Location: [facility] OR     IR CHEST PORT PLACEMENT > 5 YRS OF AGE  5/2/2019     LAPAROSCOPIC NEPHRECTOMY Right 3/19/2019    Procedure: Right laparoscopic radical nephrectomy;  Surgeon: Wesley Cleveland MD;  Location:  OR      SOCIAL HISTORY:   Social History     Tobacco Use     Smoking status: Never Smoker     Smokeless tobacco: Never Used   Substance Use Topics     Drug use: No     FAMILY " HISTORY:   Family History   Problem Relation Age of Onset     Diabetes Father      ALLERGIES:   No Known Allergies  CURRENT MEDICATIONS:   Current Outpatient Medications:      amLODIPine (NORVASC) 10 MG tablet, Take 1 tablet (10 mg) by mouth daily, Disp: 30 tablet, Rfl: 0     atorvastatin (LIPITOR) 20 MG tablet, Take 60 mg by mouth daily , Disp: , Rfl:      Barberry-Oreg Grape-Goldenseal (BERBERINE COMPLEX PO), , Disp: , Rfl:      calcium citrate-vitamin D (CITRACAL) 315-250 MG-UNIT TABS per tablet, Take 650 mg by mouth 2 times daily , Disp: , Rfl:      cycloSPORINE (RESTASIS) 0.05 % ophthalmic emulsion, Place 1 drop into both eyes 2 times daily , Disp: , Rfl:      dexamethasone (DECADRON) 4 MG tablet, Take 2 tablets (8 mg) by mouth 2 times daily (with meals) Start evening of Docetaxel infusion and continue for a total of 3 doses., Disp: 6 tablet, Rfl: 9     fluconazole (DIFLUCAN) 200 MG tablet, Take 1 tablet (200 mg) by mouth daily, Disp: 60 tablet, Rfl: 1     Glucosamine-Chondroit-Vit C-Mn (GLUCOSAMINE 1500 COMPLEX) CAPS, Take 1 capsule by mouth daily, Disp: , Rfl:      levothyroxine (SYNTHROID/LEVOTHROID) 112 MCG tablet, Take 112 mcg by mouth daily, Disp: , Rfl:      loratadine (CLARITIN) 10 MG tablet, , Disp: , Rfl:      MAGNESIUM PO, Take 250 mg by mouth 2 times daily , Disp: , Rfl:      MEBENDAZOLE PO, Take 225 mg by mouth daily , Disp: , Rfl:      metFORMIN (GLUCOPHAGE XR) 500 MG 24 hr tablet, Take 1 tablet with breakfast, 2 tablets with dinner., Disp: , Rfl:      Multiple Vitamins-Minerals (MULTIVITAMIN ADULT EXTRA C PO), Take 1 tablet by mouth every 24 hours, Disp: , Rfl:      Nutritional Supplements (SALMON OIL) CAPS, Take 2 capsules by mouth daily , Disp: , Rfl:      omeprazole (PRILOSEC) 20 MG DR capsule, Take 20 mg by mouth daily, Disp: , Rfl:      predniSONE (DELTASONE) 5 MG tablet, Take 1 tablet (5 mg) by mouth 2 times daily, Disp: 42 tablet, Rfl: 0     predniSONE (DELTASONE) 5 MG tablet, Take 1  tablet (5 mg) by mouth 2 times daily for 21 days, Disp: 42 tablet, Rfl: 0     tamsulosin (FLOMAX) 0.4 MG capsule, Take 1 capsule (0.4 mg) by mouth daily, Disp: 30 capsule, Rfl: 3     nystatin (MYCOSTATIN) 715517 UNIT/ML suspension, Take 5 mLs (500,000 Units) by mouth 4 times daily Swish and Swallow (Patient not taking: No sig reported), Disp: 200 mL, Rfl: 1     prochlorperazine (COMPAZINE) 10 MG tablet, Take 1 tablet (10 mg) by mouth every 6 hours as needed for nausea or vomiting (Patient not taking: No sig reported), Disp: 30 tablet, Rfl: 2     prochlorperazine (COMPAZINE) 10 MG tablet, Take 1 tablet (10 mg) by mouth every 6 hours as needed for nausea or vomiting (Patient not taking: No sig reported), Disp: 30 tablet, Rfl: 3  No current facility-administered medications for this visit.    Physical Exam:   /78 (BP Location: Right arm)   Pulse 66   Temp 98.2  F (36.8  C) (Oral)   Resp 16   Wt 110.5 kg (243 lb 8 oz)   SpO2 97%   BMI 33.70 kg/m     General: well appearing, no acute distress, pleasant male   HEENT: normocephalic, atraumatic, PERRLA, sclerae nonicteric. Small amount of oral thrush is present on the tongue.   CV: regular rate and rhythm, no murmurs  Lungs: clear to auscultation bilaterally, no wheezes/rales/rhonchi  Abd: soft, positive bowel sounds, non-distended, non-tender  MSK: full range of motion in all four extremities, no peripheral edema  Neuro: alert and oriented x3, CN grossly intact   Psych: appropriate mood and affect  Skin: no rashes or lesions  . Mild decrease in anal tone. No masses.    ECOG PS 1.    LABORATORY DATA:  Most Recent 3 CBC's:  Recent Labs   Lab Test 10/03/22  0811 09/12/22  0933 08/22/22  1036   WBC 5.0 5.4 5.9   HGB 12.4* 12.8* 12.2*   MCV 92 93 92    188 211   ANEUTAUTO 2.9 3.4 3.8     Most Recent 3 BMP's:  Recent Labs   Lab Test 09/12/22  0933 08/22/22  1036 08/01/22  1113    142 143   POTASSIUM 3.9 3.9 4.1   CHLORIDE 105 108 111*   CO2 23 26 24  "  BUN 12.4 15 15   CR 0.85 0.87 0.88   ANIONGAP 12 8 8   BETHANY 8.9 8.5 8.8   GLC 85 81 115*   PROTTOTAL 6.5 6.8 7.0   ALBUMIN 4.1 3.6 3.7    Most Recent 3 LFT's:  Recent Labs   Lab Test 09/12/22  0933 08/22/22  1036 08/01/22  1113   AST 23 17 13   ALT 21 25 32   ALKPHOS 58 66 80   BILITOTAL 0.6 0.7 0.4    Most Recent 2 TSH and T4:  Recent Labs   Lab Test 07/07/21  0834 03/31/21  0824   TSH 1.67 1.63     PSA and Testosterone 09/12/22: pending.    Latest Reference Range & Units 07/07/21 08:34 08/01/22 11:13 08/22/22 10:36   Testosterone Total 240 - 950 ng/dL 6 (L) <2 (L) <2 (L)   (L): Data is abnormally low     Latest Reference Range & Units 05/05/22 11:14 07/11/22 09:22 08/01/22 11:13 08/22/22 10:36   PSA 0.00 - 4.00 ug/L 1.79 2.16 1.78 1.36     I reviewed the above labs today.    ASSESSMENT & PLAN: Mr. Reyes is a delightful 62 year old gentleman with metastatic castration sensitive prostate adenocarcinoma as well as an incidentally diagnosed stage III clear-cell renal cell carcinoma of the right kidney (s/p radical R nephrectomy 3/19/19), who is here for a follow-up visit and initiation of docetaxel for now metastatic castration-resistant prostate cancer.     1. Metastatic castration-resistant prostate cancer, with involvement of the bones and RPLN:   - Patient met the criteria for CHAARTED \"high-volume\" metastatic hormone-sensitive prostate cancer. He opted for docetaxel in combination with ADT (per JOSEFA, [id number], FELIPEEDE). Tolerated 6 cycles of docetaxel fairly well (5/8/19 through 8/21/19), with the exception of mild fatigue and mild neuropathy.   - Continue leuprolide 22.5mg every 3 months, last dosed on 08/02/22. Will plan to give on 10/25/22 with appointment when Walter returns for cycle 6.   - S/P radiation to  L5/S1 (4/2022) with resolution of low back pain and left leg numbness.   - Obtain blood for CFDNA  - Rising PSA from 0.72 (3/22/22)-->1.79 (5/5/22). He had CT scan and NM Bone Scan done " 5/5 which confirmed disease progression. Stopped Xtandi and began Docetaxel for metastatic castration resistant prostate cancer. Tolerated cycle 1 well, IV benadryl as premed without reaction so will keep. His PSA level is responding. pre-docetaxel PSA 2.16. Most recently PSA from 08/22/22 down trending to 1.36. PSA and Testosterone pending today. andrés Watson has completed 4 cycles of chemotherapy which she has tolerated reasonably well.  The only complications to date have included thrush and mild hypotensive episodes that occur after the infusion.  He has mild decrease in anal tone.     We are going to proceed with the fifth and 6 cycle of the chemotherapy and then I am going to restage him with a PSMA PET scan.  The PSMA PET scan will determine if there is growth out of proportion to the PSA decline as well as determine if there is PSMA avid disease that is amenable to future treatment with lutetium.    We discussed those plans in detail and questions were answered.  We will reconvene in mid November after the next 2 cycles have been completed and the scans have been done.        2. Bone metastases:   - Noted to have osseous metastatic disease at the time of diagnosis. Last bone scan (5/5/22) showed new focus of increased uptake left superior acetabulum and distal left femoral diaphysis; slight increased uptake in the left first rib; stable increased uptake in the other metastatic foci.   - Encouraged to continue calcium and vitamin D supplementation.  - Will continue Zometa 4mg IV every ~3 months, last dosed on 8/2. Will plan to give on 10/25/22 with appointment when Walter returns for cycle 6.         3. Stage 3, UISS-high risk ccRCC: s/p R nephrectomy   - approximately 40-50% risk of recurrence after definitive surgery.   - no clear evidence of residual disease at this time; the retroperitoneal lymphadenopathy is more consistent with metastatic prostate cancer   - Continue active surveillance with  self-reporting for signs/symptoms of recurrence (this was previously explained in detail) and periodic H&P and scans.    4. Thrush  Recurrent with prednisone use. Fluconazole maintenance 200 mg daily and nystatin swoosh and swallow prn for flares.

## 2022-10-03 NOTE — PATIENT INSTRUCTIONS
Walter has completed 4 cycles of chemotherapy which she has tolerated reasonably well.  The only complications to date have included thrush and mild hypotensive episodes that occur after the infusion.  During those times he stops his amlodipine.  He also has a complaint of a decreased anal tone but no liu diarrhea or loss of fecal control.    We are going to proceed with the fifth and 6 cycle of the chemotherapy and then I am going to restage him with a PSMA PET scan.  The PSMA PET scan will determine if there is growth out of proportion to the PSA decline as well as determine if there is PSMA avid disease that is amenable to future treatment with lutetium.    We discussed those plans in detail and questions were answered.  We will reconvene in mid November after the next 2 cycles have been completed and the scans have been done.

## 2022-10-04 NOTE — NURSING NOTE
"Oncology Rooming Note    October 4, 2022 9:03 AM   Walter Reyes is a 62 year old male who presents for:    Chief Complaint   Patient presents with     Port Draw     Labs drawn via port by RN. Vitals taken.     Oncology Clinic Visit     [facility] RETURN - PROSTATE CANCER     Initial Vitals: /78 (BP Location: Right arm)   Pulse 66   Temp 98.2  F (36.8  C) (Oral)   Resp 16   Wt 110.5 kg (243 lb 8 oz)   SpO2 97%   BMI 33.70 kg/m   Estimated body mass index is 33.7 kg/m  as calculated from the following:    Height as of 5/9/22: 1.811 m (5' 11.28\").    Weight as of this encounter: 110.5 kg (243 lb 8 oz). Body surface area is 2.36 meters squared.  No Pain (0) Comment: Data Unavailable   No LMP for male patient.  Allergies reviewed: Yes  Medications reviewed: Yes    Medications: Medication refills not needed today.  Pharmacy name entered into EPIC:    [facility] - [place], [place] - [address] [id number] - [place], [place] - [address] [facility] - [place], [place] - [address] [facility] [place], [place] - [id number]    Clinical concerns: No new concerns. Singh was notified.      Scott Cuba LPN            "

## 2022-10-05 LAB — TESTOST SERPL-MCNC: 6 NG/DL (ref 240–950)

## 2022-10-10 ENCOUNTER — RESEARCH ENCOUNTER (OUTPATIENT)
Dept: ONCOLOGY | Facility: CLINIC | Age: 62
End: 2022-10-10

## 2022-10-10 NOTE — PROGRESS NOTES
[id number]: Re-consent Informed Consent Note     The consent form, including purpose, risks and benefits, was reviewed with Walter Reyes, and all questions were answered before he signed the consent form. The patient understands that the study involves an active treatment phase as well as a post-treatment follow up phase.     Present during the discussion was Walter Reyes. A copy of the signed form was provided to the patient. No procedures specific to this study were performed prior to the patient signing the consent form.    Consent Version Date: 6/13/2022  Consent obtained by: Heather Niño    Date: 10/10/2022  HIPAA authorization signed?: NA  HIPAA authorization version date: NA  Heather Niño  Clinical Research Coordinator II  [facility]  T: [phone]    Form 503.03.01 (Version 2)     Effective date: 01AUG2018     Next Review Date: 01AUG2020 [id number]: Study Visit Note   Subject name: Walter DELORIS Reyes     Visit: 12 Month    Did the study visit occur within the appropriate window allowed by the protocol? Yes    If no, why? NA    Since the last study visit, He has been doing fair. He discontinued the Enzalutamide in May 2022 and started Docetaxel in July 2022. His most recent Docetaxel infusion was on 10/3/2022. He reports that the infusion makes him feel weak and nauseous, and he is also experiencing thrush. This morning, he has been feeling better, but he reports that last night was difficult.     Patient met with  on 10/10/2022 for month 12 visit and completed all cognitive testing and questionnaires.     I have personally interviewed Walter DELORIS Reyes and reviewed his medical record for concomitant medications and these have been recorded on the corresponding logs in Walter Reyes's research file.     Called Micheline, Walter's wife, to go through the informant questionnaire on 10/10/2022. Informant questionnaire was  completed.    Walter Reyes was given the opportunity to ask any trial related questions.  Please see provider progress note for physical exam and other clinical information. Labs were reviewed - any significant lab values were addressed and reviewed.    Heather Niño  Clinical Research Coordinator II  [facility]  T: [phone]

## 2022-10-22 ENCOUNTER — HEALTH MAINTENANCE LETTER (OUTPATIENT)
Age: 62
End: 2022-10-22

## 2022-10-23 ENCOUNTER — NURSE TRIAGE (OUTPATIENT)
Dept: NURSING | Facility: CLINIC | Age: 62
End: 2022-10-23

## 2022-10-23 NOTE — TELEPHONE ENCOUNTER
Pt is calling.    Has labs and an infusion tomorrow. Wife tested positive for COVID last night. He tested negative so far today. They are staying away from each other. She is downstairs.   He is asymptomatic at this time.     Wondering if he should still come in for his labs and infusion tomorrow at 9:00am.    I advised him to contact the clinic first thing tomorrow AM when open. They are usually there 7am-5pm.  They can let him know if he needs to come in.  Since he is boosted and asymptomatic, tested negative today so far, they may just have him come in, but to be safe, I advised him to contact [facility] when they are open tomorrow AM    Care advice reviewed. When to call back reviewed per care advice, and he verbalized understanding and agreed to follow advice given.      Madeline Marroquin RN  [facility] Nurse Advisor  10/23/2022 at 8:54 AM        Reason for Disposition    [1] CLOSE CONTACT COVID-19 EXPOSURE within last 14 days AND [2] NO symptoms    Additional Information    Negative: COVID-19 lab test positive    Negative: [1] Lives with someone known to have influenza (flu test positive) AND [2] flu-like symptoms (e.g., cough, runny nose, sore throat, SOB; with or without fever)    Negative: [1] Symptoms of COVID-19 (e.g., cough, fever, SOB, or others) AND [2] doctor (or NP/PA) diagnosed COVID-19 based on symptoms    Negative: [1] Symptoms of COVID-19 (e.g., cough, fever, SOB, or others) AND [2] lives in an area with community spread    Negative: [1] Symptoms of COVID-19 (e.g., cough, fever, SOB, or others) AND [2] within 14 days of EXPOSURE (close contact) with diagnosed or suspected COVID-19 patient    Negative: [1] Symptoms of COVID-19 (e.g., cough, fever, SOB, or others) AND [2] within 14 days of travel from high-risk area for COVID-19 community spread (identified by CDC)    Negative: [1] Difficulty breathing (shortness of breath) occurs AND [2] onset > 14 days after COVID-19 EXPOSURE (Close  Contact)    Negative: [1] Dry cough occurs AND [2] onset > 14 days after COVID-19 EXPOSURE    Negative: [1] Wet cough (i.e., white-yellow, yellow, green, or jordyn colored sputum) AND [2] onset > 14 days after COVID-19 EXPOSURE    Negative: [1] Common cold symptoms AND [2] onset > 14 days after COVID-19 EXPOSURE    Negative: COVID-19 vaccine reaction suspected (e.g., fever, headache, muscle aches) occurring during days 1 to 3 after getting vaccine    Negative: COVID-19 vaccine, questions about    Negative: [1] CLOSE CONTACT COVID-19 EXPOSURE within last 14 days AND [2] needs COVID-19 lab test to return to work AND [3] NO symptoms    Negative: [1] CLOSE CONTACT COVID-19 EXPOSURE within last 14 days AND [2] exposed person is a  (e.g., police or paramedic) AND [3] NO symptoms    Negative: [1] CLOSE CONTACT COVID-19 EXPOSURE within last 14 days AND [2] exposed person is a healthcare worker who was NOT using all recommended personal protective equipment (e.g., a respirator-N95 mask, eye protection, gloves, and gown) AND [3] NO symptoms    Negative: [1] Living or working in a correctional facility, long-term care facility, or shelter (i.e., congregate setting; densely populated) AND [2] where an outbreak has occurred AND [3] NO symptoms    Negative: [1] CLOSE CONTACT COVID-19 EXPOSURE within last 14 days AND [2] weak immune system (e.g., HIV positive, cancer chemo, splenectomy, organ transplant, chronic steroids) AND [3] NO symptoms    Protocols used: CORONAVIRUS (COVID-19) EXPOSURE-A- 1.18.2022

## 2022-10-24 ENCOUNTER — INFUSION THERAPY VISIT (OUTPATIENT)
Dept: ONCOLOGY | Facility: CLINIC | Age: 62
End: 2022-10-24
Payer: COMMERCIAL

## 2022-10-24 ENCOUNTER — APPOINTMENT (OUTPATIENT)
Dept: LAB | Facility: CLINIC | Age: 62
End: 2022-10-24
Payer: COMMERCIAL

## 2022-10-24 ENCOUNTER — ONCOLOGY VISIT (OUTPATIENT)
Dept: ONCOLOGY | Facility: CLINIC | Age: 62
End: 2022-10-24
Payer: COMMERCIAL

## 2022-10-24 VITALS
DIASTOLIC BLOOD PRESSURE: 80 MMHG | OXYGEN SATURATION: 98 % | BODY MASS INDEX: 33.79 KG/M2 | WEIGHT: 244.2 LBS | RESPIRATION RATE: 16 BRPM | HEART RATE: 78 BPM | SYSTOLIC BLOOD PRESSURE: 115 MMHG | TEMPERATURE: 98 F

## 2022-10-24 DIAGNOSIS — C79.51 MALIGNANT NEOPLASM OF PROSTATE METASTATIC TO BONE (H): Primary | ICD-10-CM

## 2022-10-24 DIAGNOSIS — C61 MALIGNANT NEOPLASM OF PROSTATE METASTATIC TO BONE (H): Primary | ICD-10-CM

## 2022-10-24 DIAGNOSIS — B37.0 THRUSH: ICD-10-CM

## 2022-10-24 LAB
ALBUMIN SERPL BCG-MCNC: 4.1 G/DL (ref 3.5–5.2)
ALP SERPL-CCNC: 48 U/L (ref 40–129)
ALT SERPL W P-5'-P-CCNC: 17 U/L (ref 10–50)
ANION GAP SERPL CALCULATED.3IONS-SCNC: 10 MMOL/L (ref 7–15)
AST SERPL W P-5'-P-CCNC: 22 U/L (ref 10–50)
BASOPHILS # BLD AUTO: 0 10E3/UL (ref 0–0.2)
BASOPHILS NFR BLD AUTO: 0 %
BILIRUB SERPL-MCNC: 0.6 MG/DL
BUN SERPL-MCNC: 16.2 MG/DL (ref 8–23)
CALCIUM SERPL-MCNC: 9.4 MG/DL (ref 8.8–10.2)
CHLORIDE SERPL-SCNC: 101 MMOL/L (ref 98–107)
CREAT SERPL-MCNC: 0.93 MG/DL (ref 0.67–1.17)
DEPRECATED HCO3 PLAS-SCNC: 26 MMOL/L (ref 22–29)
EOSINOPHIL # BLD AUTO: 0 10E3/UL (ref 0–0.7)
EOSINOPHIL NFR BLD AUTO: 0 %
ERYTHROCYTE [DISTWIDTH] IN BLOOD BY AUTOMATED COUNT: 17.2 % (ref 10–15)
GFR SERPL CREATININE-BSD FRML MDRD: >90 ML/MIN/1.73M2
GLUCOSE SERPL-MCNC: 89 MG/DL (ref 70–99)
HCT VFR BLD AUTO: 37.6 % (ref 40–53)
HGB BLD-MCNC: 12.3 G/DL (ref 13.3–17.7)
IMM GRANULOCYTES # BLD: 0.1 10E3/UL
IMM GRANULOCYTES NFR BLD: 1 %
LYMPHOCYTES # BLD AUTO: 1.5 10E3/UL (ref 0.8–5.3)
LYMPHOCYTES NFR BLD AUTO: 22 %
MCH RBC QN AUTO: 30.3 PG (ref 26.5–33)
MCHC RBC AUTO-ENTMCNC: 32.7 G/DL (ref 31.5–36.5)
MCV RBC AUTO: 93 FL (ref 78–100)
MONOCYTES # BLD AUTO: 0.7 10E3/UL (ref 0–1.3)
MONOCYTES NFR BLD AUTO: 10 %
NEUTROPHILS # BLD AUTO: 4.6 10E3/UL (ref 1.6–8.3)
NEUTROPHILS NFR BLD AUTO: 67 %
NRBC # BLD AUTO: 0 10E3/UL
NRBC BLD AUTO-RTO: 0 /100
PLATELET # BLD AUTO: 193 10E3/UL (ref 150–450)
POTASSIUM SERPL-SCNC: 4.1 MMOL/L (ref 3.4–5.3)
PROT SERPL-MCNC: 6.2 G/DL (ref 6.4–8.3)
PSA SERPL-MCNC: 0.81 NG/ML (ref 0–4.5)
RBC # BLD AUTO: 4.06 10E6/UL (ref 4.4–5.9)
SODIUM SERPL-SCNC: 137 MMOL/L (ref 136–145)
WBC # BLD AUTO: 7 10E3/UL (ref 4–11)

## 2022-10-24 PROCEDURE — 250N000011 HC RX IP 250 OP 636: Performed by: NURSE PRACTITIONER

## 2022-10-24 PROCEDURE — 250N000011 HC RX IP 250 OP 636: Performed by: INTERNAL MEDICINE

## 2022-10-24 PROCEDURE — 85025 COMPLETE CBC W/AUTO DIFF WBC: CPT | Performed by: NURSE PRACTITIONER

## 2022-10-24 PROCEDURE — 80053 COMPREHEN METABOLIC PANEL: CPT

## 2022-10-24 PROCEDURE — 96413 CHEMO IV INFUSION 1 HR: CPT

## 2022-10-24 PROCEDURE — 258N000003 HC RX IP 258 OP 636: Performed by: NURSE PRACTITIONER

## 2022-10-24 PROCEDURE — 84153 ASSAY OF PSA TOTAL: CPT

## 2022-10-24 PROCEDURE — 99215 OFFICE O/P EST HI 40 MIN: CPT | Performed by: NURSE PRACTITIONER

## 2022-10-24 PROCEDURE — 82040 ASSAY OF SERUM ALBUMIN: CPT

## 2022-10-24 PROCEDURE — 96402 CHEMO HORMON ANTINEOPL SQ/IM: CPT

## 2022-10-24 PROCEDURE — 96375 TX/PRO/DX INJ NEW DRUG ADDON: CPT

## 2022-10-24 PROCEDURE — G0463 HOSPITAL OUTPT CLINIC VISIT: HCPCS

## 2022-10-24 PROCEDURE — 84403 ASSAY OF TOTAL TESTOSTERONE: CPT

## 2022-10-24 PROCEDURE — 36415 COLL VENOUS BLD VENIPUNCTURE: CPT

## 2022-10-24 PROCEDURE — 96367 TX/PROPH/DG ADDL SEQ IV INF: CPT

## 2022-10-24 RX ORDER — NALOXONE HYDROCHLORIDE 0.4 MG/ML
0.2 INJECTION, SOLUTION INTRAMUSCULAR; INTRAVENOUS; SUBCUTANEOUS
Status: CANCELLED | OUTPATIENT
Start: 2022-10-24

## 2022-10-24 RX ORDER — HEPARIN SODIUM (PORCINE) LOCK FLUSH IV SOLN 100 UNIT/ML 100 UNIT/ML
5 SOLUTION INTRAVENOUS
Status: CANCELLED | OUTPATIENT
Start: 2022-10-24

## 2022-10-24 RX ORDER — DEXAMETHASONE 4 MG/1
8 TABLET ORAL 2 TIMES DAILY WITH MEALS
Qty: 6 TABLET | Refills: 0 | Status: SHIPPED | OUTPATIENT
Start: 2022-10-24 | End: 2023-01-09

## 2022-10-24 RX ORDER — MEPERIDINE HYDROCHLORIDE 25 MG/ML
25 INJECTION INTRAMUSCULAR; INTRAVENOUS; SUBCUTANEOUS EVERY 30 MIN PRN
Status: CANCELLED | OUTPATIENT
Start: 2022-10-24

## 2022-10-24 RX ORDER — EPINEPHRINE 1 MG/ML
0.3 INJECTION, SOLUTION INTRAMUSCULAR; SUBCUTANEOUS EVERY 5 MIN PRN
Status: CANCELLED | OUTPATIENT
Start: 2022-10-24

## 2022-10-24 RX ORDER — DIPHENHYDRAMINE HYDROCHLORIDE 50 MG/ML
50 INJECTION INTRAMUSCULAR; INTRAVENOUS
Status: CANCELLED
Start: 2022-10-24

## 2022-10-24 RX ORDER — HEPARIN SODIUM (PORCINE) LOCK FLUSH IV SOLN 100 UNIT/ML 100 UNIT/ML
5 SOLUTION INTRAVENOUS ONCE
Status: COMPLETED | OUTPATIENT
Start: 2022-10-24 | End: 2022-10-24

## 2022-10-24 RX ORDER — NYSTATIN 100000/ML
500000 SUSPENSION, ORAL (FINAL DOSE FORM) ORAL 4 TIMES DAILY
Qty: 200 ML | Refills: 1 | Status: SHIPPED | OUTPATIENT
Start: 2022-10-24 | End: 2023-02-10

## 2022-10-24 RX ORDER — ALBUTEROL SULFATE 90 UG/1
1-2 AEROSOL, METERED RESPIRATORY (INHALATION)
Status: CANCELLED
Start: 2022-10-24

## 2022-10-24 RX ORDER — ALBUTEROL SULFATE 0.83 MG/ML
2.5 SOLUTION RESPIRATORY (INHALATION)
Status: CANCELLED | OUTPATIENT
Start: 2022-10-24

## 2022-10-24 RX ORDER — METHYLPREDNISOLONE SODIUM SUCCINATE 125 MG/2ML
125 INJECTION, POWDER, LYOPHILIZED, FOR SOLUTION INTRAMUSCULAR; INTRAVENOUS
Status: CANCELLED
Start: 2022-10-24

## 2022-10-24 RX ORDER — HEPARIN SODIUM (PORCINE) LOCK FLUSH IV SOLN 100 UNIT/ML 100 UNIT/ML
5 SOLUTION INTRAVENOUS
Status: CANCELLED | OUTPATIENT
Start: 2022-11-10

## 2022-10-24 RX ORDER — ZOLEDRONIC ACID 0.04 MG/ML
4 INJECTION, SOLUTION INTRAVENOUS ONCE
Status: COMPLETED | OUTPATIENT
Start: 2022-10-24 | End: 2022-10-24

## 2022-10-24 RX ORDER — HEPARIN SODIUM,PORCINE 10 UNIT/ML
5 VIAL (ML) INTRAVENOUS
Status: CANCELLED | OUTPATIENT
Start: 2022-11-10

## 2022-10-24 RX ORDER — HEPARIN SODIUM,PORCINE 10 UNIT/ML
5 VIAL (ML) INTRAVENOUS
Status: CANCELLED | OUTPATIENT
Start: 2022-10-24

## 2022-10-24 RX ORDER — LORAZEPAM 2 MG/ML
0.5 INJECTION INTRAMUSCULAR EVERY 4 HOURS PRN
Status: CANCELLED | OUTPATIENT
Start: 2022-10-24

## 2022-10-24 RX ORDER — ZOLEDRONIC ACID 0.04 MG/ML
4 INJECTION, SOLUTION INTRAVENOUS ONCE
Status: CANCELLED | OUTPATIENT
Start: 2022-11-10 | End: 2022-11-10

## 2022-10-24 RX ORDER — HEPARIN SODIUM (PORCINE) LOCK FLUSH IV SOLN 100 UNIT/ML 100 UNIT/ML
5 SOLUTION INTRAVENOUS
Status: DISCONTINUED | OUTPATIENT
Start: 2022-10-24 | End: 2022-10-24 | Stop reason: HOSPADM

## 2022-10-24 RX ORDER — PREDNISONE 5 MG/1
5 TABLET ORAL 2 TIMES DAILY
Qty: 42 TABLET | Refills: 0 | Status: SHIPPED | OUTPATIENT
Start: 2022-10-24 | End: 2022-11-14

## 2022-10-24 RX ADMIN — DIPHENHYDRAMINE HYDROCHLORIDE 50 MG: 50 INJECTION, SOLUTION INTRAMUSCULAR; INTRAVENOUS at 10:44

## 2022-10-24 RX ADMIN — Medication 5 ML: at 09:13

## 2022-10-24 RX ADMIN — DOCETAXEL 172 MG: 20 INJECTION, SOLUTION, CONCENTRATE INTRAVENOUS at 11:38

## 2022-10-24 RX ADMIN — SODIUM CHLORIDE 250 ML: 9 INJECTION, SOLUTION INTRAVENOUS at 10:43

## 2022-10-24 RX ADMIN — ZOLEDRONIC ACID 4 MG: 0.04 INJECTION, SOLUTION INTRAVENOUS at 11:15

## 2022-10-24 RX ADMIN — Medication 5 ML: at 12:52

## 2022-10-24 RX ADMIN — DEXAMETHASONE SODIUM PHOSPHATE: 10 INJECTION, SOLUTION INTRAMUSCULAR; INTRAVENOUS at 10:57

## 2022-10-24 RX ADMIN — LEUPROLIDE ACETATE 22.5 MG: KIT at 12:43

## 2022-10-24 ASSESSMENT — PAIN SCALES - GENERAL: PAINLEVEL: NO PAIN (0)

## 2022-10-24 NOTE — PROGRESS NOTES
Infusion Nursing Note:  Walter Reyes presents today for Cycle 6 Day 1 Docetaxel (Taxotere), Lupron, and Zometa.    Patient seen by provider today: Yes: Veda Greenwood NP.   present during visit today: Not Applicable.    Note:     10/24/22 1050 TORB:  Veda Greenwood NP/Kiran Hansen RN  - Please give Lurpon and Zometa today if OK with pharmacy/insurance.    Intravenous Access:  Implanted Port.    Treatment Conditions:  Lab Results   Component Value Date    HGB 12.3 (L) 10/24/2022    WBC 7.0 10/24/2022    ANEU 5.0 07/07/2021    ANEUTAUTO 4.6 10/24/2022     10/24/2022      Lab Results   Component Value Date     10/24/2022    POTASSIUM 4.1 10/24/2022    CR 0.93 10/24/2022    BETHANY 9.4 10/24/2022    BILITOTAL 0.6 10/24/2022    ALBUMIN 4.1 10/24/2022    ALT 17 10/24/2022    AST 22 10/24/2022     Results reviewed, labs MET treatment parameters, ok to proceed with treatment.    Post Infusion Assessment:  Patient tolerated infusion without incident.  Patient tolerated injection without incident.  Lupron administered intramuscularly into patient's RIGHT ventrogluteal muscle.  Blood return noted pre and post infusion.  Site patent and intact, free from redness, edema or discomfort.  No evidence of extravasations.  Access discontinued per protocol.     Discharge Plan:   Prescription refills sent to [facility] by Veda Gerenwood NP.  Discharge instructions reviewed with: Patient and Family.  Patient and/or family verbalized understanding of discharge instructions and all questions answered.  AVS to patient via Germ[employer].  Patient will follow-up with Dr. Winters on 11/21.  Patient discharged in stable condition accompanied by: daughter.  Departure Mode: Ambulatory.  Face to Face time: 0.      KIRAN HANSEN RN

## 2022-10-24 NOTE — TELEPHONE ENCOUNTER
Walter calls in this AM has tested negative for Covid. But wife has tested postive, They are quarantining from one another.      Has labs, provider appointment and then infusion. Advised to keep lab and provider appt and then provider can decide wether or not he should keep his infusion appointment.

## 2022-10-24 NOTE — NURSING NOTE
"Chief Complaint   Patient presents with     Port Draw     Labs drawn via port by RN. Vitals taken.     Labs drawn via port by RN. Port accessed with 20G 3/4\" power needle. Flushed with NS and heparin. Pt tolerated well. Vitals taken. Pt checked in for next appointment.    Rosina Mcfadden, RN  "

## 2022-10-24 NOTE — PROGRESS NOTES
"  [facility] Oncology Followup  Oncologist: Dr. Jj Winters  Oct 24, 2022    REASON FOR VISIT: Follow-up for metastatic castration-resistant prostate cancer, here for cycle 4 Docetaxel    HISTORY OF PRESENT ILLNESS: Mr. 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 His oncologic history is detailed below.     1/6/21  PSA = 0.29  3/31/21 PSA = 0.44  7/7/21  PSA = 0.47  11/2021 PSA = 1.05  -- Start XTANDI  12/16/21 PSA =0.22  2/11/22 PSA= 0.50  3/22/22 PSA=0.72  5/5/2022 PSA=1.79  7/11/2022 PSA=2.16 --Start cycle 1 docetaxel   8/22/22 PSA = 1.36  9/12/22 PSA = 1.09    Interval History:.     ONCOLOGIC HISTORY:  1. 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 Naylor at [facility]. Prior PSA were 1.4 on 8/16/17, 2.4 on 6/28/16, 2.9 on 6/28/16, and as low as 0.4 on 3/26/2003.   - 1/08/2019: Consultation with Sarah Wilson CNP in Urology clinic. Repeat PSA 9.6.  - 1/16/2019: MRI prostate with contrast - \"This examination is characterized as PIRADS 5- very high probability. Clinically significant cancer is highly likely to be present. There is a large, invasive mass arising from the right peripheral zone and extending into the neurovascular bundle, seminal vesicle, and along the anterolateral right mesorectal fascia. Metastatic right external iliac lymph node. Metastatic lesion in the posterior/superior right acetabulum.\"  - 1/25/2019: CT abdomen and pelvis with IV contrast - \"1. Heterogeneous enhancing mass posteriorly in the upper pole of the right kidney measures 4.5 x 5.8 x 5.7 cm (AP by transverse by craniocaudal). It has a small nodular component extending posterior medially which abuts the " "right psoas muscle. This nodular extension measures 2.1 x 2.1 cm. Minimal stranding about the mass. No definite thrombus within the right renal vein. This renal mass is compatible with a renal cell carcinoma. A paraaortic lymph node situated immediately posterior to the left renal vein measures 1.1 cm in short axis, suspicious for metastasis. 2. A 2 cm right external iliac lymph node is also suspicious for metastases. 3. Multiple sclerotic osseous lesions suspicious for metastases. These include 0.8 and 0.6 cm sclerotic lesions laterally in the right iliac wing (images 53 and 62 respectively). Ill-defined groundglass density laterally in the right acetabulum measuring 1.7 x 1.2 cm corresponds with the lesion identified on MRI. A 0.4 cm groundglass density in the left acetabulum. Sclerotic lesion in the left femoral neck measures 0.9 x 1.6 cm (image 81). Sclerotic metastases would be more compatible with prostate metastases. 4. A 3 subcentimeter hepatic lesions are indeterminate. Metastases would be a consideration. These can be further characterized with liver MRI.\"  - 1/25/2019: NM bone scan - \"There is focal bony uptake in the left femoral neck, right acetabulum, right of midline at the S1 or L5 level of the spine, within multiple bilateral ribs, in the C7 or T1 level of the spine, and anteriorly within the skull. Findings are suspicious for metastases.\"  - 1/31/19: CT chest with contrast - \" No suspicious nodules in the chest.  Stable appearance of a 5.8 cm right renal mass concerning for renal carcinoma until proven otherwise.  Stable indeterminant subcentimeter hypodensities in the liver.  Multifocal osteoblastic metastasis including 2.5 cm lesion in the right fifth rib posteriorly and a 1.6 cm lesion in the right third rib posteriorly. No lytic lesions identified.\"  - 2/6/19: CT-guided right sclerotic fifth rib lesion biopsy - \"Metastatic carcinoma, consistent with prostate primary.  Immunohistochemical stains " "performed show the metastatic carcinoma stains positive for NKX3.1 (prostate marker), negative for STACIE 3 and PAX8, which supports the above diagnosis.\"  - 2/15/19: Started Casodex 50mg every day and consented for the biobanking protocol. 3/18/19 - stopped Casodex.  - 2/19/19: Case discussed in tumor board - recommendation for nephectomy for suspected malignant right renal mass.   - 2/26/19: Started Lupron 22.5mg every 3 months.   - 5/8/19: Started Docetaxel 75mg/m2 IV every 3 weeks. 06/18/19 - cycle 3, 7/10/19 - cycle 4, 07/31/19 - cycle 5, 08/21/19 - cycle 6.   - 10/2/19: Restaging CT CAP and NM Bone Scan showed improved osseous disease, no evidence of recurrent or new metastatic disease.  - 1/5/20: Restaging CT CAP and NM Bone Scan showed stable osseous disease, no evidence of recurrent or new metastatic disease.  - 4/6/20: NM bone scan with slightly improved uptake in known skeletal mets. CT C/A/P with contrast with stable osseous mets, no yusuf enlargement, no new visceral mets etc.  - 04/08/20: Zometa every 3 months.  - 10/5/20: CT C/A/P with contrast and NM bone scan - SD.   - 1/4/21: CT C/A/P with contrast and NM bone scan - SD but slight increase in right 5th rib tracer uptake and in posterior L5 sclerosis.   - 03/29/21: CT C/A/P with contrast - single new right sacral 8mm bone lesion (suspicious), other bone mets stable. No visceral/yusuf disease. Nephrectomy site without recurrence. NM bone scan - SD but \"increased uptake associated with the prior lesions of the lower  cervical spine, posterior right fourth rib and right L5 posterior arch elements. Otherwise unchanged uptake associated with the proximal left femur and left anterior fourth rib. Similar uptake of the left halux, possibly secondary to degenerative osteoarthritis or gout.\"  -  START XTANDI  -  RT to L5 / S1  -  STOPPED XTANDI   -07/11/2022  START Cycle 1 Docetaxel     2. Stage III ([id number]), grade 3 of 4, clear-cell RCC " "of right kidney:  - Incidentally diagnosed as above.   - Underwent curative-intent robotic right radical nephrectomy with Dr. Wesley Cleveland on 3/19/19. No tumor spillage per op report.   - Path showed: \"Histologic Type: Clear cell renal cell carcinoma; Sarcomatoid Features: Not identified; Histologic Grade: Nucleolar grade 3 (WHO/ISUP). Extent Tumor Size: 4.3 cm. Microscopic Tumor Extension: Tumor extension into renal sinus (in vascular structures). Margins: Negative. Tumor Necrosis: Present; focal. Lymph-Vascular Invasion: Not identified.  Pathologic Staging (pTNM) Primary Tumor (pT): pT3a: Tumor extends into renal vein branches/renal sinus. Regional Lymph Nodes (pN): pNX. Number of Lymph Nodes Examined: 0 Distant Metastasis (pM): pM N/A.\"  - Restaging scans as above.    INTERVAL HISTORY:   Walter presents for oncology follow-up visit today prior to Cycle 6 Docetaxel. His wife had a positive covid test, his test yesterday was negative. Mild loss in anal tone still present but less noticeable this last cycle, no diarrhea. Mild neuropathy in fingers and feet--more noticeable with chemo but he is still functional. Had episode of low BP and cold sweats this morning. Better once he had something to eat and drink.        ROS is otherwise negative.     PAST MEDICAL HISTORY:  Past Medical History:   Diagnosis Date     Cancer of kidney, right (H)      Complication of anesthesia     slow wakeup      Malignant neoplasm of prostate metastatic to bone (H) 2/14/2019     Thyroid disease      PAST SURGICAL HISTORY:   Past Surgical History:   Procedure Laterality Date     CYSTOSCOPY      about 10 years ago     INSERT PORT VASCULAR ACCESS Right 5/2/2019    Procedure: Chest Port Placement;  Surgeon: Tate Burciaga PA-C;  Location: [facility] OR     IR CHEST PORT PLACEMENT > 5 YRS OF AGE  5/2/2019     LAPAROSCOPIC NEPHRECTOMY Right 3/19/2019    Procedure: Right laparoscopic radical nephrectomy;  Surgeon: Wesley Cleveland, " MD;  Location:  OR      SOCIAL HISTORY:   Social History     Tobacco Use     Smoking status: Never     Smokeless tobacco: Never   Substance Use Topics     Drug use: No     FAMILY HISTORY:   Family History   Problem Relation Age of Onset     Diabetes Father      ALLERGIES:   No Known Allergies  CURRENT MEDICATIONS:   Current Outpatient Medications:      amLODIPine (NORVASC) 10 MG tablet, Take 1 tablet (10 mg) by mouth daily, Disp: 30 tablet, Rfl: 0     atorvastatin (LIPITOR) 20 MG tablet, Take 60 mg by mouth daily , Disp: , Rfl:      Barberry-Oreg Grape-Goldenseal (BERBERINE COMPLEX PO), , Disp: , Rfl:      calcium citrate-vitamin D (CITRACAL) 315-250 MG-UNIT TABS per tablet, Take 650 mg by mouth 2 times daily , Disp: , Rfl:      cycloSPORINE (RESTASIS) 0.05 % ophthalmic emulsion, Place 1 drop into both eyes 2 times daily , Disp: , Rfl:      dexamethasone (DECADRON) 4 MG tablet, Take 2 tablets (8 mg) by mouth 2 times daily (with meals) Start evening of Docetaxel infusion and continue for a total of 3 doses., Disp: 6 tablet, Rfl: 9     fluconazole (DIFLUCAN) 200 MG tablet, Take 1 tablet (200 mg) by mouth daily, Disp: 60 tablet, Rfl: 1     Glucosamine-Chondroit-Vit C-Mn (GLUCOSAMINE 1500 COMPLEX) CAPS, Take 1 capsule by mouth daily, Disp: , Rfl:      levothyroxine (SYNTHROID/LEVOTHROID) 112 MCG tablet, Take 112 mcg by mouth daily, Disp: , Rfl:      loratadine (CLARITIN) 10 MG tablet, , Disp: , Rfl:      MAGNESIUM PO, Take 250 mg by mouth 2 times daily , Disp: , Rfl:      MEBENDAZOLE PO, Take 225 mg by mouth daily , Disp: , Rfl:      metFORMIN (GLUCOPHAGE XR) 500 MG 24 hr tablet, Take 1 tablet with breakfast, 2 tablets with dinner., Disp: , Rfl:      Multiple Vitamins-Minerals (MULTIVITAMIN ADULT EXTRA C PO), Take 1 tablet by mouth every 24 hours, Disp: , Rfl:      Nutritional Supplements (SALMON OIL) CAPS, Take 2 capsules by mouth daily , Disp: , Rfl:      nystatin (MYCOSTATIN) 823392 UNIT/ML suspension, Take 5 mLs  (500,000 Units) by mouth 4 times daily Swish and Swallow, Disp: 200 mL, Rfl: 1     omeprazole (PRILOSEC) 20 MG DR capsule, Take 20 mg by mouth daily, Disp: , Rfl:      predniSONE (DELTASONE) 5 MG tablet, Take 1 tablet (5 mg) by mouth 2 times daily for 21 days, Disp: 42 tablet, Rfl: 0     predniSONE (DELTASONE) 5 MG tablet, Take 1 tablet (5 mg) by mouth 2 times daily, Disp: 42 tablet, Rfl: 0     prochlorperazine (COMPAZINE) 10 MG tablet, Take 1 tablet (10 mg) by mouth every 6 hours as needed for nausea or vomiting (Patient not taking: No sig reported), Disp: 30 tablet, Rfl: 2     prochlorperazine (COMPAZINE) 10 MG tablet, Take 1 tablet (10 mg) by mouth every 6 hours as needed for nausea or vomiting (Patient not taking: No sig reported), Disp: 30 tablet, Rfl: 3     tamsulosin (FLOMAX) 0.4 MG capsule, Take 1 capsule (0.4 mg) by mouth daily, Disp: 30 capsule, Rfl: 3    Physical Exam:   /80 (BP Location: Right arm, Patient Position: Sitting, Cuff Size: Adult Large)   Pulse 78   Temp 98  F (36.7  C) (Oral)   Resp 16   Wt 110.8 kg (244 lb 3.2 oz)   SpO2 98%   BMI 33.79 kg/m     General: well appearing, no acute distress, pleasant male   HEENT: normocephalic, atraumatic, PERRLA, sclerae nonicteric. Small amount of oral thrush is present on the tongue.   CV: regular rate and rhythm, no murmurs  Lungs: clear to auscultation bilaterally, no wheezes/rales/rhonchi  Abd: soft, positive bowel sounds, non-distended, non-tender  MSK: full range of motion in all four extremities, no peripheral edema  Neuro: alert and oriented x3, CN grossly intact   Psych: appropriate mood and affect  Skin: no rashes or lesions  . Mild decrease in anal tone. No masses.    ECOG PS 1.    LABORATORY DATA:    PSA pending  Most Recent 3 CBC's:  Recent Labs   Lab Test 10/24/22  0921 10/03/22  0811 09/12/22  0933   WBC 7.0 5.0 5.4   HGB 12.3* 12.4* 12.8*   MCV 93 92 93    198 188   ANEUTAUTO 4.6 2.9 3.4     Most Recent 3 BMP's:  Recent  "Labs   Lab Test 10/03/22  0811 09/12/22  0933 08/22/22  1036    140 142   POTASSIUM 4.0 3.9 3.9   CHLORIDE 103 105 108   CO2 26 23 26   BUN 13.1 12.4 15   CR 0.90 0.85 0.87   ANIONGAP 11 12 8   BETHANY 9.1 8.9 8.5   GLC 93 85 81   PROTTOTAL 6.5 6.5 6.8   ALBUMIN 4.1 4.1 3.6    Most Recent 3 LFT's:  Recent Labs   Lab Test 10/03/22  0811 09/12/22  0933 08/22/22  1036   AST 21 23 17   ALT 25 21 25   ALKPHOS 50 58 66   BILITOTAL 0.6 0.6 0.7    Most Recent 2 TSH and T4:  Recent Labs   Lab Test 07/07/21  0834 03/31/21  0824   TSH 1.67 1.63       I reviewed the above labs today.    ASSESSMENT & PLAN: Mr. Reyes is a delightful 62 year old gentleman with metastatic castration sensitive prostate adenocarcinoma as well as an incidentally diagnosed stage III clear-cell renal cell carcinoma of the right kidney (s/p radical R nephrectomy 3/19/19), who is here for a follow-up visit and initiation of docetaxel for now metastatic castration-resistant prostate cancer.     1. Metastatic castration-resistant prostate cancer, with involvement of the bones and RPLN:   - Patient met the criteria for CHAARTED \"high-volume\" metastatic hormone-sensitive prostate cancer. He opted for docetaxel in combination with ADT (per CHAARTED, [id number], STAMPEDE). Tolerated 6 cycles of docetaxel fairly well (5/8/19 through 8/21/19), with the exception of mild fatigue and mild neuropathy.   - Continue leuprolide 22.5mg every 3 months, last dosed on 08/02/22. Will plan to give on 10/25/22 with appointment when Walter returns for cycle 6.   - S/P radiation to  L5/S1 (4/2022) with resolution of low back pain and left leg numbness.   - Obtain blood for CFDNA  - Rising PSA from 0.72 (3/22/22)-->1.79 (5/5/22). He had CT scan and NM Bone Scan done 5/5 which confirmed disease progression. Stopped Xtandi and began Docetaxel for metastatic castration resistant prostate cancer. Tolerated cycle 1 well, IV benadryl as premed without reaction so will " keep. His PSA level is responding. pre-docetaxel PSA 2.16. Most recently PSA below 1! PSA and Testosterone pending today.     Walter has completed 5 cycles of chemotherapy which she has tolerated reasonably well.  The only complications to date have included thrush and mild hypotensive episodes that occur after the infusion.     We are going to proceed with the sixth cycle of the chemotherapy today and then will restage him with a PSMA PET scan.  The PSMA PET scan will determine if there is growth out of proportion to the PSA decline as well as determine if there is PSMA avid disease that is amenable to future treatment with lutetium.    2. Bone metastases:   - Noted to have osseous metastatic disease at the time of diagnosis. Last bone scan (5/5/22) showed new focus of increased uptake left superior acetabulum and distal left femoral diaphysis; slight increased uptake in the left first rib; stable increased uptake in the other metastatic foci.   - Encouraged to continue calcium and vitamin D supplementation.  - Will continue Zometa 4mg IV every ~3 months, last dosed on 8/2. Will plan to give on today with cycle 6     3. Stage 3, UISS-high risk ccRCC: s/p R nephrectomy   - approximately 40-50% risk of recurrence after definitive surgery.   - no clear evidence of residual disease at this time; the retroperitoneal lymphadenopathy is more consistent with metastatic prostate cancer   - Continue active surveillance with self-reporting for signs/symptoms of recurrence (this was previously explained in detail) and periodic H&P and scans.    4. Thrush  Recurrent with prednisone use. Fluconazole maintenance 200 mg daily and nystatin swoosh and swallow prn for flares.     50 minutes spent on the date of the encounter doing chart review, review of test results, interpretation of tests, patient visit, documentation and discussion with other provider(s)     Veda Greenwood CNP on 10/24/2022 at 10:06 AM

## 2022-10-24 NOTE — NURSING NOTE
"Oncology Rooming Note    October 24, 2022 9:44 AM   Walter Reyes is a 62 year old male who presents for:    Chief Complaint   Patient presents with     Port Draw     Labs drawn via port by RN. Vitals taken.     Oncology Clinic Visit     Metastatic Prostate Cancer     Initial Vitals: /80 (BP Location: Right arm, Patient Position: Sitting, Cuff Size: Adult Large)   Pulse 78   Temp 98  F (36.7  C) (Oral)   Resp 16   Wt 110.8 kg (244 lb 3.2 oz)   SpO2 98%   BMI 33.79 kg/m   Estimated body mass index is 33.79 kg/m  as calculated from the following:    Height as of 5/9/22: 1.811 m (5' 11.28\").    Weight as of this encounter: 110.8 kg (244 lb 3.2 oz). Body surface area is 2.36 meters squared.  No Pain (0) Comment: Data Unavailable   No LMP for male patient.  Allergies reviewed: Yes  Medications reviewed: Yes    Medications: Pt will need Prednisone refilled.  Pharmacy name entered into EPIC:    [facility] - [place], [place] - [address] [id number] [place], [place] - [address] [facility] - [place], [place] [phone] [facility] [place], [place] - [phone]    Clinical concerns: None       Meena See LPN  10/24/2022              "

## 2022-10-26 LAB — TESTOST SERPL-MCNC: 14 NG/DL (ref 240–950)

## 2022-11-14 ENCOUNTER — HOSPITAL ENCOUNTER (OUTPATIENT)
Dept: MRI IMAGING | Facility: CLINIC | Age: 62
Discharge: HOME | End: 2022-11-14
Attending: INTERNAL MEDICINE
Payer: COMMERCIAL

## 2022-11-14 ENCOUNTER — HOSPITAL ENCOUNTER (OUTPATIENT)
Dept: PET IMAGING | Facility: CLINIC | Age: 62
Discharge: HOME | End: 2022-11-14
Attending: INTERNAL MEDICINE
Payer: COMMERCIAL

## 2022-11-14 DIAGNOSIS — C61 PROSTATE CANCER (H): ICD-10-CM

## 2022-11-14 PROCEDURE — 250N000011 HC RX IP 250 OP 636: Performed by: INTERNAL MEDICINE

## 2022-11-14 PROCEDURE — 72197 MRI PELVIS W/O & W/DYE: CPT

## 2022-11-14 PROCEDURE — 71260 CT THORAX DX C+: CPT | Mod: 26 | Performed by: RADIOLOGY

## 2022-11-14 PROCEDURE — 78815 PET IMAGE W/CT SKULL-THIGH: CPT | Mod: PS

## 2022-11-14 PROCEDURE — 74177 CT ABD & PELVIS W/CONTRAST: CPT | Mod: 26 | Performed by: RADIOLOGY

## 2022-11-14 PROCEDURE — 74177 CT ABD & PELVIS W/CONTRAST: CPT

## 2022-11-14 PROCEDURE — A9585 GADOBUTROL INJECTION: HCPCS | Performed by: INTERNAL MEDICINE

## 2022-11-14 PROCEDURE — A9800 HC RX 343: HCPCS | Performed by: INTERNAL MEDICINE

## 2022-11-14 PROCEDURE — 72197 MRI PELVIS W/O & W/DYE: CPT | Mod: 26 | Performed by: RADIOLOGY

## 2022-11-14 PROCEDURE — 78815 PET IMAGE W/CT SKULL-THIGH: CPT | Mod: 26 | Performed by: RADIOLOGY

## 2022-11-14 PROCEDURE — 255N000002 HC RX 255 OP 636: Performed by: INTERNAL MEDICINE

## 2022-11-14 PROCEDURE — 343N000001 HC RX 343: Performed by: INTERNAL MEDICINE

## 2022-11-14 RX ORDER — IOPAMIDOL 755 MG/ML
45-135 INJECTION, SOLUTION INTRAVASCULAR ONCE
Status: COMPLETED | OUTPATIENT
Start: 2022-11-14 | End: 2022-11-14

## 2022-11-14 RX ORDER — GADOBUTROL 604.72 MG/ML
10 INJECTION INTRAVENOUS ONCE
Status: COMPLETED | OUTPATIENT
Start: 2022-11-14 | End: 2022-11-14

## 2022-11-14 RX ADMIN — KIT FOR THE PREPARATION OF GALLIUM GA 68 GOZETOTIDE 5.5 MILLICURIE: 25 INJECTION, POWDER, LYOPHILIZED, FOR SOLUTION INTRAVENOUS at 08:22

## 2022-11-14 RX ADMIN — GADOBUTROL 10 ML: 604.72 INJECTION INTRAVENOUS at 07:56

## 2022-11-14 RX ADMIN — IOPAMIDOL 122 ML: 755 INJECTION, SOLUTION INTRAVENOUS at 09:15

## 2022-11-21 ENCOUNTER — APPOINTMENT (OUTPATIENT)
Dept: LAB | Facility: CLINIC | Age: 62
End: 2022-11-21
Attending: INTERNAL MEDICINE
Payer: COMMERCIAL

## 2022-11-21 ENCOUNTER — ONCOLOGY VISIT (OUTPATIENT)
Dept: ONCOLOGY | Facility: CLINIC | Age: 62
End: 2022-11-21
Attending: INTERNAL MEDICINE
Payer: COMMERCIAL

## 2022-11-21 VITALS
DIASTOLIC BLOOD PRESSURE: 86 MMHG | BODY MASS INDEX: 35.29 KG/M2 | SYSTOLIC BLOOD PRESSURE: 131 MMHG | RESPIRATION RATE: 16 BRPM | WEIGHT: 255 LBS | TEMPERATURE: 98.4 F | HEART RATE: 79 BPM | OXYGEN SATURATION: 98 %

## 2022-11-21 DIAGNOSIS — C79.51 MALIGNANT NEOPLASM OF PROSTATE METASTATIC TO BONE (H): ICD-10-CM

## 2022-11-21 DIAGNOSIS — C61 MALIGNANT NEOPLASM OF PROSTATE METASTATIC TO BONE (H): ICD-10-CM

## 2022-11-21 LAB
ALBUMIN SERPL BCG-MCNC: 4.1 G/DL (ref 3.5–5.2)
ALP SERPL-CCNC: 48 U/L (ref 40–129)
ALT SERPL W P-5'-P-CCNC: 22 U/L (ref 10–50)
ANION GAP SERPL CALCULATED.3IONS-SCNC: 10 MMOL/L (ref 7–15)
AST SERPL W P-5'-P-CCNC: 20 U/L (ref 10–50)
BASOPHILS # BLD AUTO: 0 10E3/UL (ref 0–0.2)
BASOPHILS NFR BLD AUTO: 1 %
BILIRUB SERPL-MCNC: 0.3 MG/DL
BUN SERPL-MCNC: 11.8 MG/DL (ref 8–23)
CALCIUM SERPL-MCNC: 9.2 MG/DL (ref 8.8–10.2)
CHLORIDE SERPL-SCNC: 107 MMOL/L (ref 98–107)
CREAT SERPL-MCNC: 0.93 MG/DL (ref 0.67–1.17)
DEPRECATED HCO3 PLAS-SCNC: 25 MMOL/L (ref 22–29)
EOSINOPHIL # BLD AUTO: 0.1 10E3/UL (ref 0–0.7)
EOSINOPHIL NFR BLD AUTO: 1 %
ERYTHROCYTE [DISTWIDTH] IN BLOOD BY AUTOMATED COUNT: 16.2 % (ref 10–15)
GFR SERPL CREATININE-BSD FRML MDRD: >90 ML/MIN/1.73M2
GLUCOSE SERPL-MCNC: 133 MG/DL (ref 70–99)
HCT VFR BLD AUTO: 38.7 % (ref 40–53)
HGB BLD-MCNC: 12.6 G/DL (ref 13.3–17.7)
IMM GRANULOCYTES # BLD: 0 10E3/UL
IMM GRANULOCYTES NFR BLD: 1 %
LYMPHOCYTES # BLD AUTO: 1.3 10E3/UL (ref 0.8–5.3)
LYMPHOCYTES NFR BLD AUTO: 21 %
MCH RBC QN AUTO: 31.3 PG (ref 26.5–33)
MCHC RBC AUTO-ENTMCNC: 32.6 G/DL (ref 31.5–36.5)
MCV RBC AUTO: 96 FL (ref 78–100)
MONOCYTES # BLD AUTO: 0.5 10E3/UL (ref 0–1.3)
MONOCYTES NFR BLD AUTO: 8 %
NEUTROPHILS # BLD AUTO: 4.2 10E3/UL (ref 1.6–8.3)
NEUTROPHILS NFR BLD AUTO: 68 %
NRBC # BLD AUTO: 0 10E3/UL
NRBC BLD AUTO-RTO: 0 /100
PLATELET # BLD AUTO: 216 10E3/UL (ref 150–450)
POTASSIUM SERPL-SCNC: 4.4 MMOL/L (ref 3.4–5.3)
PROT SERPL-MCNC: 6.5 G/DL (ref 6.4–8.3)
PSA SERPL-MCNC: 0.89 NG/ML (ref 0–4.5)
RBC # BLD AUTO: 4.02 10E6/UL (ref 4.4–5.9)
SODIUM SERPL-SCNC: 142 MMOL/L (ref 136–145)
WBC # BLD AUTO: 6.1 10E3/UL (ref 4–11)

## 2022-11-21 PROCEDURE — 82040 ASSAY OF SERUM ALBUMIN: CPT | Performed by: INTERNAL MEDICINE

## 2022-11-21 PROCEDURE — 99214 OFFICE O/P EST MOD 30 MIN: CPT | Performed by: INTERNAL MEDICINE

## 2022-11-21 PROCEDURE — 36415 COLL VENOUS BLD VENIPUNCTURE: CPT | Performed by: INTERNAL MEDICINE

## 2022-11-21 PROCEDURE — 84153 ASSAY OF PSA TOTAL: CPT | Performed by: INTERNAL MEDICINE

## 2022-11-21 PROCEDURE — 80053 COMPREHEN METABOLIC PANEL: CPT | Performed by: INTERNAL MEDICINE

## 2022-11-21 PROCEDURE — G0463 HOSPITAL OUTPT CLINIC VISIT: HCPCS

## 2022-11-21 PROCEDURE — 85004 AUTOMATED DIFF WBC COUNT: CPT | Performed by: INTERNAL MEDICINE

## 2022-11-21 ASSESSMENT — PAIN SCALES - GENERAL: PAINLEVEL: NO PAIN (0)

## 2022-11-21 NOTE — PROGRESS NOTES
"  [facility] Oncology Followup  Oncologist: Dr. Jj Winters  Nov 21, 2022    REASON FOR VISIT: Follow-up for metastatic castration-resistant prostate cancer, here for cycle 4 Docetaxel    HISTORY OF PRESENT ILLNESS: Mr. 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 His oncologic history is detailed below.     1/6/21  PSA = 0.29  3/31/21 PSA = 0.44  7/7/21  PSA = 0.47  11/2021 PSA = 1.05  -- Start XTANDI  12/16/21 PSA =0.22  2/11/22 PSA= 0.50  3/22/22 PSA=0.72  5/5/2022 PSA=1.79  7/11/2022 PSA=2.16 --Start cycle 1 docetaxel   8/22/22 PSA = 1.36  9/12/22 PSA = 1.09  10/24/22 Cycle #6 of Docetaxel.     Interval History:.     ONCOLOGIC HISTORY:  1. 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 Naylor at [facility]. Prior PSA were 1.4 on 8/16/17, 2.4 on 6/28/16, 2.9 on 6/28/16, and as low as 0.4 on 3/26/2003.   - 1/08/2019: Consultation with Sarah Wilson CNP in Urology clinic. Repeat PSA 9.6.  - 1/16/2019: MRI prostate with contrast - \"This examination is characterized as PIRADS 5- very high probability. Clinically significant cancer is highly likely to be present. There is a large, invasive mass arising from the right peripheral zone and extending into the neurovascular bundle, seminal vesicle, and along the anterolateral right mesorectal fascia. Metastatic right external iliac lymph node. Metastatic lesion in the posterior/superior right acetabulum.\"  - 1/25/2019: CT abdomen and pelvis with IV contrast - \"1. Heterogeneous enhancing mass posteriorly in the upper pole of the right kidney measures 4.5 x 5.8 x 5.7 cm (AP by transverse by craniocaudal). It has a small nodular component extending " "posterior medially which abuts the right psoas muscle. This nodular extension measures 2.1 x 2.1 cm. Minimal stranding about the mass. No definite thrombus within the right renal vein. This renal mass is compatible with a renal cell carcinoma. A paraaortic lymph node situated immediately posterior to the left renal vein measures 1.1 cm in short axis, suspicious for metastasis. 2. A 2 cm right external iliac lymph node is also suspicious for metastases. 3. Multiple sclerotic osseous lesions suspicious for metastases. These include 0.8 and 0.6 cm sclerotic lesions laterally in the right iliac wing (images 53 and 62 respectively). Ill-defined groundglass density laterally in the right acetabulum measuring 1.7 x 1.2 cm corresponds with the lesion identified on MRI. A 0.4 cm groundglass density in the left acetabulum. Sclerotic lesion in the left femoral neck measures 0.9 x 1.6 cm (image 81). Sclerotic metastases would be more compatible with prostate metastases. 4. A 3 subcentimeter hepatic lesions are indeterminate. Metastases would be a consideration. These can be further characterized with liver MRI.\"  - 1/25/2019: NM bone scan - \"There is focal bony uptake in the left femoral neck, right acetabulum, right of midline at the S1 or L5 level of the spine, within multiple bilateral ribs, in the C7 or T1 level of the spine, and anteriorly within the skull. Findings are suspicious for metastases.\"  - 1/31/19: CT chest with contrast - \" No suspicious nodules in the chest.  Stable appearance of a 5.8 cm right renal mass concerning for renal carcinoma until proven otherwise.  Stable indeterminant subcentimeter hypodensities in the liver.  Multifocal osteoblastic metastasis including 2.5 cm lesion in the right fifth rib posteriorly and a 1.6 cm lesion in the right third rib posteriorly. No lytic lesions identified.\"  - 2/6/19: CT-guided right sclerotic fifth rib lesion biopsy - \"Metastatic carcinoma, consistent with prostate " "primary.  Immunohistochemical stains performed show the metastatic carcinoma stains positive for NKX3.1 (prostate marker), negative for STACIE 3 and PAX8, which supports the above diagnosis.\"  - 2/15/19: Started Casodex 50mg every day and consented for the biobanking protocol. 3/18/19 - stopped Casodex.  - 2/19/19: Case discussed in tumor board - recommendation for nephectomy for suspected malignant right renal mass.   - 2/26/19: Started Lupron 22.5mg every 3 months.   - 5/8/19: Started Docetaxel 75mg/m2 IV every 3 weeks. 06/18/19 - cycle 3, 7/10/19 - cycle 4, 07/31/19 - cycle 5, 08/21/19 - cycle 6.   - 10/2/19: Restaging CT CAP and NM Bone Scan showed improved osseous disease, no evidence of recurrent or new metastatic disease.  - 1/5/20: Restaging CT CAP and NM Bone Scan showed stable osseous disease, no evidence of recurrent or new metastatic disease.  - 4/6/20: NM bone scan with slightly improved uptake in known skeletal mets. CT C/A/P with contrast with stable osseous mets, no yusuf enlargement, no new visceral mets etc.  - 04/08/20: Zometa every 3 months.  - 10/5/20: CT C/A/P with contrast and NM bone scan - SD.   - 1/4/21: CT C/A/P with contrast and NM bone scan - SD but slight increase in right 5th rib tracer uptake and in posterior L5 sclerosis.   - 03/29/21: CT C/A/P with contrast - single new right sacral 8mm bone lesion (suspicious), other bone mets stable. No visceral/yusuf disease. Nephrectomy site without recurrence. NM bone scan - SD but \"increased uptake associated with the prior lesions of the lower  cervical spine, posterior right fourth rib and right L5 posterior arch elements. Otherwise unchanged uptake associated with the proximal left femur and left anterior fourth rib. Similar uptake of the left halux, possibly secondary to degenerative osteoarthritis or gout.\"  -  START XTANDI  -  RT to L5 / S1  -  STOPPED XTANDI   -07/11/2022  START Cycle 1 Docetaxel     9/12/22  PSA " "=1.09  10/3/22  PSA= 0.95  10/24/22  PSA= 0.81 ( Last dose of Docetaxel --Cycle #6)  11/21/22  PSA =0.89    2. Stage III ([id number]), grade 3 of 4, clear-cell RCC of right kidney:  - Incidentally diagnosed as above.   - Underwent curative-intent robotic right radical nephrectomy with Dr. Wesley Cleveland on 3/19/19. No tumor spillage per op report.   - Path showed: \"Histologic Type: Clear cell renal cell carcinoma; Sarcomatoid Features: Not identified; Histologic Grade: Nucleolar grade 3 (WHO/ISUP). Extent Tumor Size: 4.3 cm. Microscopic Tumor Extension: Tumor extension into renal sinus (in vascular structures). Margins: Negative. Tumor Necrosis: Present; focal. Lymph-Vascular Invasion: Not identified.  Pathologic Staging (pTNM) Primary Tumor (pT): pT3a: Tumor extends into renal vein branches/renal sinus. Regional Lymph Nodes (pN): pNX. Number of Lymph Nodes Examined: 0 Distant Metastasis (pM): pM N/A.\"  - Restaging scans as above.    INTERVAL HISTORY:   Walter presents for oncology follow-up visit today prior to Cycle 6 Docetaxel. Doing well overall. (+) fatigue       PAST MEDICAL HISTORY:  Past Medical History:   Diagnosis Date     Cancer of kidney, right (H)      Complication of anesthesia     slow wakeup      Malignant neoplasm of prostate metastatic to bone (H) 2/14/2019     Thyroid disease      PAST SURGICAL HISTORY:   Past Surgical History:   Procedure Laterality Date     CYSTOSCOPY      about 10 years ago     INSERT PORT VASCULAR ACCESS Right 5/2/2019    Procedure: Chest Port Placement;  Surgeon: Tate Burciaga PA-C;  Location: [facility] OR     IR CHEST PORT PLACEMENT > 5 YRS OF AGE  5/2/2019     LAPAROSCOPIC NEPHRECTOMY Right 3/19/2019    Procedure: Right laparoscopic radical nephrectomy;  Surgeon: Wesley Cleveland MD;  Location:  OR      SOCIAL HISTORY:   Social History     Tobacco Use     Smoking status: Never     Smokeless tobacco: Never   Substance Use Topics     Drug use: No     FAMILY " HISTORY:   Family History   Problem Relation Age of Onset     Diabetes Father      ALLERGIES:   No Known Allergies  CURRENT MEDICATIONS:   Current Outpatient Medications:      amLODIPine (NORVASC) 10 MG tablet, Take 1 tablet (10 mg) by mouth daily, Disp: 30 tablet, Rfl: 0     atorvastatin (LIPITOR) 20 MG tablet, Take 60 mg by mouth daily , Disp: , Rfl:      Barberry-Oreg Grape-Goldenseal (BERBERINE COMPLEX PO), , Disp: , Rfl:      calcium citrate-vitamin D (CITRACAL) 315-250 MG-UNIT TABS per tablet, Take 650 mg by mouth 2 times daily , Disp: , Rfl:      cycloSPORINE (RESTASIS) 0.05 % ophthalmic emulsion, Place 1 drop into both eyes 2 times daily , Disp: , Rfl:      dexamethasone (DECADRON) 4 MG tablet, Take 2 tablets (8 mg) by mouth 2 times daily (with meals) Start evening of Docetaxel infusion and continue for a total of 3 doses., Disp: 6 tablet, Rfl: 0     fluconazole (DIFLUCAN) 200 MG tablet, Take 1 tablet (200 mg) by mouth daily, Disp: 60 tablet, Rfl: 1     Glucosamine-Chondroit-Vit C-Mn (GLUCOSAMINE 1500 COMPLEX) CAPS, Take 1 capsule by mouth daily, Disp: , Rfl:      levothyroxine (SYNTHROID/LEVOTHROID) 112 MCG tablet, Take 112 mcg by mouth daily, Disp: , Rfl:      loratadine (CLARITIN) 10 MG tablet, , Disp: , Rfl:      MAGNESIUM PO, Take 250 mg by mouth 2 times daily , Disp: , Rfl:      MEBENDAZOLE PO, Take 225 mg by mouth daily , Disp: , Rfl:      metFORMIN (GLUCOPHAGE XR) 500 MG 24 hr tablet, Take 1 tablet with breakfast, 2 tablets with dinner., Disp: , Rfl:      Multiple Vitamins-Minerals (MULTIVITAMIN ADULT EXTRA C PO), Take 1 tablet by mouth every 24 hours, Disp: , Rfl:      Nutritional Supplements (SALMON OIL) CAPS, Take 2 capsules by mouth daily , Disp: , Rfl:      nystatin (MYCOSTATIN) 514531 UNIT/ML suspension, Take 5 mLs (500,000 Units) by mouth 4 times daily Swish and Swallow, Disp: 200 mL, Rfl: 1     omeprazole (PRILOSEC) 20 MG DR capsule, Take 20 mg by mouth daily, Disp: , Rfl:      predniSONE  (DELTASONE) 5 MG tablet, Take 1 tablet (5 mg) by mouth 2 times daily, Disp: 42 tablet, Rfl: 0     prochlorperazine (COMPAZINE) 10 MG tablet, Take 1 tablet (10 mg) by mouth every 6 hours as needed for nausea or vomiting, Disp: 30 tablet, Rfl: 2     prochlorperazine (COMPAZINE) 10 MG tablet, Take 1 tablet (10 mg) by mouth every 6 hours as needed for nausea or vomiting, Disp: 30 tablet, Rfl: 3     tamsulosin (FLOMAX) 0.4 MG capsule, Take 1 capsule (0.4 mg) by mouth daily, Disp: 30 capsule, Rfl: 3    Physical Exam:   /86   Pulse 79   Temp 98.4  F (36.9  C) (Oral)   Resp 16   Wt 115.7 kg (255 lb)   SpO2 98%   BMI 35.29 kg/m     General: well appearing, no acute distress, pleasant male   HEENT: normocephalic, atraumatic, PERRLA, sclerae nonicteric. Small amount of oral thrush is present on the tongue.   CV: regular rate and rhythm, no murmurs  Lungs: clear to auscultation bilaterally, no wheezes/rales/rhonchi  Abd: soft, positive bowel sounds, non-distended, non-tender  MSK: full range of motion in all four extremities, no peripheral edema  Neuro: alert and oriented x3, CN grossly intact   Psych: appropriate mood and affect  Skin: no rashes or lesions  . Mild decrease in anal tone. No masses.    ECOG PS 1.    LABORATORY DATA:  Most Recent 3 CBC's:  Recent Labs   Lab Test 11/21/22  0846 10/24/22  0921 10/03/22  0811   WBC 6.1 7.0 5.0   HGB 12.6* 12.3* 12.4*   MCV 96 93 92    193 198   ANEUTAUTO 4.2 4.6 2.9     Most Recent 3 BMP's:  Recent Labs   Lab Test 11/21/22  0846 10/24/22  0921 10/03/22  0811    137 140   POTASSIUM 4.4 4.1 4.0   CHLORIDE 107 101 103   CO2 25 26 26   BUN 11.8 16.2 13.1   CR 0.93 0.93 0.90   ANIONGAP 10 10 11   BETHANY 9.2 9.4 9.1   * 89 93   PROTTOTAL 6.5 6.2* 6.5   ALBUMIN 4.1 4.1 4.1    Most Recent 3 LFT's:  Recent Labs   Lab Test 11/21/22  0846 10/24/22  0921 10/03/22  0811   AST 20 22 21   ALT 22 17 25   ALKPHOS 48 48 50   BILITOTAL 0.3 0.6 0.6    Most Recent 2 TSH  "and T4:  Recent Labs   Lab Test 07/07/21  0834 03/31/21  0824   TSH 1.67 1.63     PSA and Testosterone 09/12/22: pending.    Latest Reference Range & Units 07/07/21 08:34 08/01/22 11:13 08/22/22 10:36   Testosterone Total 240 - 950 ng/dL 6 (L) <2 (L) <2 (L)   (L): Data is abnormally low     Latest Reference Range & Units 05/05/22 11:14 07/11/22 09:22 08/01/22 11:13 08/22/22 10:36   PSA 0.00 - 4.00 ug/L 1.79 2.16 1.78 1.36     I reviewed the above labs today.      11/14/22                                                                   IMPRESSION: In this patient with history of metastatic  castration-sensitive prostate cancer and incidentally diagnosed stage  3 clear cell RCC (s/p radical R nephrectomy on 3/19/19), now on  Docetaxel:     1. Uptake within the prostate gland, compatible with prostate cancer.     2. Stable 5 mm left obturator linear lymph node with very mild uptake.  Attention on follow-up.     3. Multiple PSMA avid osseous metastatic disease, as described above  in detail. C7 and L5 vertebral lesions with extension into the spinal  canal without vertebral height loss. Evaluation with MRI is  recommended.       4. Postsurgical changes of right nephrectomy without evidence for  recurrent tumor.        ASSESSMENT & PLAN: Mr. Reyes is a delightful 62 year old gentleman with metastatic castration sensitive prostate adenocarcinoma as well as an incidentally diagnosed stage III clear-cell renal cell carcinoma of the right kidney (s/p radical R nephrectomy 3/19/19), who is here for a follow-up visit and initiation of docetaxel for now metastatic castration-resistant prostate cancer.     1. Metastatic castration-resistant prostate cancer, with involvement of the bones and RPLN:   - Patient met the criteria for CHAARTED \"high-volume\" metastatic hormone-sensitive prostate cancer. He opted for docetaxel in combination with ADT (per JOSEFA, [id number], KARLA).   Walter has now been off " chemotherapy for about 3-1/2 weeks.  Last PSA was a decline compared to the previous.  A PSMA PET scan done last week reveals persistent yusuf and bony disease that is evident as well as evidence of a lesion in C7 and that will require follow-up.  At this point we do not exactly know how to interpret PSMA avid scans in the context of a declining PSA and in particular, in the case of the cervical spine, in the case of a radiated site.    To be safe, I will repeat MRI of the cervical spine however I am mostly confident that this is going to show evidence of a regressing tumor that is only mildly PSMA avid.    With respect to the larger picture of subsequent therapy we will consider moving forward with lutetium 177 based treatment at the time of PSA progression.  As of this dictation the PSA has not resulted from today however we will assume that it is continuing to decrease slightly.  I am therefore going to ask for another PSA to be done in approximately 6 to 8 weeks.  If we see that the PSA is beginning to rise I will activate the process of ordering the lutetium 177 based treatment.  The other advantage of waiting is that this will give his bone marrow some time to recover from the chemotherapy as well as for him to recover generally from this treatment.    For lutetium 177-PSMA based treatment is likely to result in some marrow suppression as well as dry mouth however does not have quite the same degree of fatigue or neuropathy that we see with chemotherapy.      2. Bone metastases:   - Noted to have osseous metastatic disease at the time of diagnosis. Last bone scan (5/5/22) showed new focus of increased uptake left superior acetabulum and distal left femoral diaphysis; slight increased uptake in the left first rib; stable increased uptake in the other metastatic foci.   - Encouraged to continue calcium and vitamin D supplementation.  - Will continue Zometa 4mg IV every ~3 months, last dosed on 8/2. Will plan to  give on 10/25/22 with appointment when Walter returns for cycle 6.         3. Stage 3, UISS-high risk ccRCC: s/p R nephrectomy   - approximately 40-50% risk of recurrence after definitive surgery.   - no clear evidence of residual disease at this time; the retroperitoneal lymphadenopathy is more consistent with metastatic prostate cancer   - Continue active surveillance with self-reporting for signs/symptoms of recurrence (this was previously explained in detail) and periodic H&P and scans.

## 2022-11-21 NOTE — LETTER
"    11/21/2022         RE: Walter Reyes  [id number] [facility] [address]        Dear Colleague,    Thank you for referring your patient, Walter Reyes, to the [facility]. Please see a copy of my visit note below.      [facility] Oncology Followup  Oncologist: Dr. Jj Winters  Nov 21, 2022    REASON FOR VISIT: Follow-up for metastatic castration-resistant prostate cancer, here for cycle 4 Docetaxel    HISTORY OF PRESENT ILLNESS: Mr. 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 His oncologic history is detailed below.     1/6/21  PSA = 0.29  3/31/21 PSA = 0.44  7/7/21  PSA = 0.47  11/2021 PSA = 1.05  -- Start XTANDI  12/16/21 PSA =0.22  2/11/22 PSA= 0.50  3/22/22 PSA=0.72  5/5/2022 PSA=1.79  7/11/2022 PSA=2.16 --Start cycle 1 docetaxel   8/22/22 PSA = 1.36  9/12/22 PSA = 1.09  10/24/22 Cycle #6 of Docetaxel.     Interval History:.     ONCOLOGIC HISTORY:  1. 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 Naylor at [facility]. Prior PSA were 1.4 on 8/16/17, 2.4 on 6/28/16, 2.9 on 6/28/16, and as low as 0.4 on 3/26/2003.   - 1/08/2019: Consultation with Sarah Wilson CNP in Urology clinic. Repeat PSA 9.6.  - 1/16/2019: MRI prostate with contrast - \"This examination is characterized as PIRADS 5- very high probability. Clinically significant cancer is highly likely to be present. There is a large, invasive mass arising from the right peripheral zone and extending into the neurovascular bundle, seminal vesicle, and along the anterolateral right mesorectal fascia. Metastatic right external iliac lymph node. Metastatic lesion in the " "posterior/superior right acetabulum.\"  - 1/25/2019: CT abdomen and pelvis with IV contrast - \"1. Heterogeneous enhancing mass posteriorly in the upper pole of the right kidney measures 4.5 x 5.8 x 5.7 cm (AP by transverse by craniocaudal). It has a small nodular component extending posterior medially which abuts the right psoas muscle. This nodular extension measures 2.1 x 2.1 cm. Minimal stranding about the mass. No definite thrombus within the right renal vein. This renal mass is compatible with a renal cell carcinoma. A paraaortic lymph node situated immediately posterior to the left renal vein measures 1.1 cm in short axis, suspicious for metastasis. 2. A 2 cm right external iliac lymph node is also suspicious for metastases. 3. Multiple sclerotic osseous lesions suspicious for metastases. These include 0.8 and 0.6 cm sclerotic lesions laterally in the right iliac wing (images 53 and 62 respectively). Ill-defined groundglass density laterally in the right acetabulum measuring 1.7 x 1.2 cm corresponds with the lesion identified on MRI. A 0.4 cm groundglass density in the left acetabulum. Sclerotic lesion in the left femoral neck measures 0.9 x 1.6 cm (image 81). Sclerotic metastases would be more compatible with prostate metastases. 4. A 3 subcentimeter hepatic lesions are indeterminate. Metastases would be a consideration. These can be further characterized with liver MRI.\"  - 1/25/2019: NM bone scan - \"There is focal bony uptake in the left femoral neck, right acetabulum, right of midline at the S1 or L5 level of the spine, within multiple bilateral ribs, in the C7 or T1 level of the spine, and anteriorly within the skull. Findings are suspicious for metastases.\"  - 1/31/19: CT chest with contrast - \" No suspicious nodules in the chest.  Stable appearance of a 5.8 cm right renal mass concerning for renal carcinoma until proven otherwise.  Stable indeterminant subcentimeter hypodensities in the liver.  " "Multifocal osteoblastic metastasis including 2.5 cm lesion in the right fifth rib posteriorly and a 1.6 cm lesion in the right third rib posteriorly. No lytic lesions identified.\"  - 2/6/19: CT-guided right sclerotic fifth rib lesion biopsy - \"Metastatic carcinoma, consistent with prostate primary.  Immunohistochemical stains performed show the metastatic carcinoma stains positive for NKX3.1 (prostate marker), negative for STACIE 3 and PAX8, which supports the above diagnosis.\"  - 2/15/19: Started Casodex 50mg every day and consented for the biobanking protocol. 3/18/19 - stopped Casodex.  - 2/19/19: Case discussed in tumor board - recommendation for nephectomy for suspected malignant right renal mass.   - 2/26/19: Started Lupron 22.5mg every 3 months.   - 5/8/19: Started Docetaxel 75mg/m2 IV every 3 weeks. 06/18/19 - cycle 3, 7/10/19 - cycle 4, 07/31/19 - cycle 5, 08/21/19 - cycle 6.   - 10/2/19: Restaging CT CAP and NM Bone Scan showed improved osseous disease, no evidence of recurrent or new metastatic disease.  - 1/5/20: Restaging CT CAP and NM Bone Scan showed stable osseous disease, no evidence of recurrent or new metastatic disease.  - 4/6/20: NM bone scan with slightly improved uptake in known skeletal mets. CT C/A/P with contrast with stable osseous mets, no yusuf enlargement, no new visceral mets etc.  - 04/08/20: Zometa every 3 months.  - 10/5/20: CT C/A/P with contrast and NM bone scan - SD.   - 1/4/21: CT C/A/P with contrast and NM bone scan - SD but slight increase in right 5th rib tracer uptake and in posterior L5 sclerosis.   - 03/29/21: CT C/A/P with contrast - single new right sacral 8mm bone lesion (suspicious), other bone mets stable. No visceral/yusuf disease. Nephrectomy site without recurrence. NM bone scan - SD but \"increased uptake associated with the prior lesions of the lower  cervical spine, posterior right fourth rib and right L5 posterior arch elements. Otherwise unchanged uptake " "associated with the proximal left femur and left anterior fourth rib. Similar uptake of the left halux, possibly secondary to degenerative osteoarthritis or gout.\"  -  START XTANDI  -  RT to L5 / S1  -  STOPPED XTANDI   -07/11/2022  START Cycle 1 Docetaxel     9/12/22  PSA =1.09  10/3/22  PSA= 0.95  10/24/22  PSA= 0.81 ( Last dose of Docetaxel --Cycle #6)  11/21/22  PSA =0.89    2. Stage III ([id number]), grade 3 of 4, clear-cell RCC of right kidney:  - Incidentally diagnosed as above.   - Underwent curative-intent robotic right radical nephrectomy with Dr. Wesely Cleveland on 3/19/19. No tumor spillage per op report.   - Path showed: \"Histologic Type: Clear cell renal cell carcinoma; Sarcomatoid Features: Not identified; Histologic Grade: Nucleolar grade 3 (WHO/ISUP). Extent Tumor Size: 4.3 cm. Microscopic Tumor Extension: Tumor extension into renal sinus (in vascular structures). Margins: Negative. Tumor Necrosis: Present; focal. Lymph-Vascular Invasion: Not identified.  Pathologic Staging (pTNM) Primary Tumor (pT): pT3a: Tumor extends into renal vein branches/renal sinus. Regional Lymph Nodes (pN): pNX. Number of Lymph Nodes Examined: 0 Distant Metastasis (pM): pM N/A.\"  - Restaging scans as above.    INTERVAL HISTORY:   Walter presents for oncology follow-up visit today prior to Cycle 6 Docetaxel. Doing well overall. (+) fatigue       PAST MEDICAL HISTORY:  Past Medical History:   Diagnosis Date     Cancer of kidney, right (H)      Complication of anesthesia     slow wakeup      Malignant neoplasm of prostate metastatic to bone (H) 2/14/2019     Thyroid disease      PAST SURGICAL HISTORY:   Past Surgical History:   Procedure Laterality Date     CYSTOSCOPY      about 10 years ago     INSERT PORT VASCULAR ACCESS Right 5/2/2019    Procedure: Chest Port Placement;  Surgeon: Tate Burciaga PA-C;  Location: [facility] OR     IR CHEST PORT PLACEMENT > 5 YRS OF AGE  5/2/2019     LAPAROSCOPIC NEPHRECTOMY " Right 3/19/2019    Procedure: Right laparoscopic radical nephrectomy;  Surgeon: Wesley Cleveland MD;  Location: [facility] OR      SOCIAL HISTORY:   Social History     Tobacco Use     Smoking status: Never     Smokeless tobacco: Never   Substance Use Topics     Drug use: No     FAMILY HISTORY:   Family History   Problem Relation Age of Onset     Diabetes Father      ALLERGIES:   No Known Allergies  CURRENT MEDICATIONS:   Current Outpatient Medications:      amLODIPine (NORVASC) 10 MG tablet, Take 1 tablet (10 mg) by mouth daily, Disp: 30 tablet, Rfl: 0     atorvastatin (LIPITOR) 20 MG tablet, Take 60 mg by mouth daily , Disp: , Rfl:      Barberry-Oreg Grape-Goldenseal (BERBERINE COMPLEX PO), , Disp: , Rfl:      calcium citrate-vitamin D (CITRACAL) 315-250 MG-UNIT TABS per tablet, Take 650 mg by mouth 2 times daily , Disp: , Rfl:      cycloSPORINE (RESTASIS) 0.05 % ophthalmic emulsion, Place 1 drop into both eyes 2 times daily , Disp: , Rfl:      dexamethasone (DECADRON) 4 MG tablet, Take 2 tablets (8 mg) by mouth 2 times daily (with meals) Start evening of Docetaxel infusion and continue for a total of 3 doses., Disp: 6 tablet, Rfl: 0     fluconazole (DIFLUCAN) 200 MG tablet, Take 1 tablet (200 mg) by mouth daily, Disp: 60 tablet, Rfl: 1     Glucosamine-Chondroit-Vit C-Mn (GLUCOSAMINE 1500 COMPLEX) CAPS, Take 1 capsule by mouth daily, Disp: , Rfl:      levothyroxine (SYNTHROID/LEVOTHROID) 112 MCG tablet, Take 112 mcg by mouth daily, Disp: , Rfl:      loratadine (CLARITIN) 10 MG tablet, , Disp: , Rfl:      MAGNESIUM PO, Take 250 mg by mouth 2 times daily , Disp: , Rfl:      MEBENDAZOLE PO, Take 225 mg by mouth daily , Disp: , Rfl:      metFORMIN (GLUCOPHAGE XR) 500 MG 24 hr tablet, Take 1 tablet with breakfast, 2 tablets with dinner., Disp: , Rfl:      Multiple Vitamins-Minerals (MULTIVITAMIN ADULT EXTRA C PO), Take 1 tablet by mouth every 24 hours, Disp: , Rfl:      Nutritional Supplements (SALMON OIL) CAPS,  Take 2 capsules by mouth daily , Disp: , Rfl:      nystatin (MYCOSTATIN) 580875 UNIT/ML suspension, Take 5 mLs (500,000 Units) by mouth 4 times daily Swish and Swallow, Disp: 200 mL, Rfl: 1     omeprazole (PRILOSEC) 20 MG DR capsule, Take 20 mg by mouth daily, Disp: , Rfl:      predniSONE (DELTASONE) 5 MG tablet, Take 1 tablet (5 mg) by mouth 2 times daily, Disp: 42 tablet, Rfl: 0     prochlorperazine (COMPAZINE) 10 MG tablet, Take 1 tablet (10 mg) by mouth every 6 hours as needed for nausea or vomiting, Disp: 30 tablet, Rfl: 2     prochlorperazine (COMPAZINE) 10 MG tablet, Take 1 tablet (10 mg) by mouth every 6 hours as needed for nausea or vomiting, Disp: 30 tablet, Rfl: 3     tamsulosin (FLOMAX) 0.4 MG capsule, Take 1 capsule (0.4 mg) by mouth daily, Disp: 30 capsule, Rfl: 3    Physical Exam:   /86   Pulse 79   Temp 98.4  F (36.9  C) (Oral)   Resp 16   Wt 115.7 kg (255 lb)   SpO2 98%   BMI 35.29 kg/m     General: well appearing, no acute distress, pleasant male   HEENT: normocephalic, atraumatic, PERRLA, sclerae nonicteric. Small amount of oral thrush is present on the tongue.   CV: regular rate and rhythm, no murmurs  Lungs: clear to auscultation bilaterally, no wheezes/rales/rhonchi  Abd: soft, positive bowel sounds, non-distended, non-tender  MSK: full range of motion in all four extremities, no peripheral edema  Neuro: alert and oriented x3, CN grossly intact   Psych: appropriate mood and affect  Skin: no rashes or lesions  . Mild decrease in anal tone. No masses.    ECOG PS 1.    LABORATORY DATA:  Most Recent 3 CBC's:  Recent Labs   Lab Test 11/21/22  0846 10/24/22  0921 10/03/22  0811   WBC 6.1 7.0 5.0   HGB 12.6* 12.3* 12.4*   MCV 96 93 92    193 198   ANEUTAUTO 4.2 4.6 2.9     Most Recent 3 BMP's:  Recent Labs   Lab Test 11/21/22  0846 10/24/22  0921 10/03/22  0811    137 140   POTASSIUM 4.4 4.1 4.0   CHLORIDE 107 101 103   CO2 25 26 26   BUN 11.8 16.2 13.1   CR 0.93 0.93 0.90    ANIONGAP 10 10 11   BETHANY 9.2 9.4 9.1   * 89 93   PROTTOTAL 6.5 6.2* 6.5   ALBUMIN 4.1 4.1 4.1    Most Recent 3 LFT's:  Recent Labs   Lab Test 11/21/22  0846 10/24/22  0921 10/03/22  0811   AST 20 22 21   ALT 22 17 25   ALKPHOS 48 48 50   BILITOTAL 0.3 0.6 0.6    Most Recent 2 TSH and T4:  Recent Labs   Lab Test 07/07/21  0834 03/31/21  0824   TSH 1.67 1.63     PSA and Testosterone 09/12/22: pending.    Latest Reference Range & Units 07/07/21 08:34 08/01/22 11:13 08/22/22 10:36   Testosterone Total 240 - 950 ng/dL 6 (L) <2 (L) <2 (L)   (L): Data is abnormally low     Latest Reference Range & Units 05/05/22 11:14 07/11/22 09:22 08/01/22 11:13 08/22/22 10:36   PSA 0.00 - 4.00 ug/L 1.79 2.16 1.78 1.36     I reviewed the above labs today.      11/14/22                                                                   IMPRESSION: In this patient with history of metastatic  castration-sensitive prostate cancer and incidentally diagnosed stage  3 clear cell RCC (s/p radical R nephrectomy on 3/19/19), now on  Docetaxel:     1. Uptake within the prostate gland, compatible with prostate cancer.     2. Stable 5 mm left obturator linear lymph node with very mild uptake.  Attention on follow-up.     3. Multiple PSMA avid osseous metastatic disease, as described above  in detail. C7 and L5 vertebral lesions with extension into the spinal  canal without vertebral height loss. Evaluation with MRI is  recommended.       4. Postsurgical changes of right nephrectomy without evidence for  recurrent tumor.        ASSESSMENT & PLAN: Mr. Reyes is a delightful 62 year old gentleman with metastatic castration sensitive prostate adenocarcinoma as well as an incidentally diagnosed stage III clear-cell renal cell carcinoma of the right kidney (s/p radical R nephrectomy 3/19/19), who is here for a follow-up visit and initiation of docetaxel for now metastatic castration-resistant prostate cancer.     1. Metastatic  "castration-resistant prostate cancer, with involvement of the bones and RPLN:   - Patient met the criteria for ARTUROED \"high-volume\" metastatic hormone-sensitive prostate cancer. He opted for docetaxel in combination with ADT (per JOSEFA, [id number], KARLA).   Walter has now been off chemotherapy for about 3-1/2 weeks.  Last PSA was a decline compared to the previous.  A PSMA PET scan done last week reveals persistent yusuf and bony disease that is evident as well as evidence of a lesion in C7 and that will require follow-up.  At this point we do not exactly know how to interpret PSMA avid scans in the context of a declining PSA and in particular, in the case of the cervical spine, in the case of a radiated site.    To be safe, I will repeat MRI of the cervical spine however I am mostly confident that this is going to show evidence of a regressing tumor that is only mildly PSMA avid.    With respect to the larger picture of subsequent therapy we will consider moving forward with lutetium 177 based treatment at the time of PSA progression.  As of this dictation the PSA has not resulted from today however we will assume that it is continuing to decrease slightly.  I am therefore going to ask for another PSA to be done in approximately 6 to 8 weeks.  If we see that the PSA is beginning to rise I will activate the process of ordering the lutetium 177 based treatment.  The other advantage of waiting is that this will give his bone marrow some time to recover from the chemotherapy as well as for him to recover generally from this treatment.    For lutetium 177-PSMA based treatment is likely to result in some marrow suppression as well as dry mouth however does not have quite the same degree of fatigue or neuropathy that we see with chemotherapy.      2. Bone metastases:   - Noted to have osseous metastatic disease at the time of diagnosis. Last bone scan (5/5/22) showed new focus of increased uptake left superior " acetabulum and distal left femoral diaphysis; slight increased uptake in the left first rib; stable increased uptake in the other metastatic foci.   - Encouraged to continue calcium and vitamin D supplementation.  - Will continue Zometa 4mg IV every ~3 months, last dosed on 8/2. Will plan to give on 10/25/22 with appointment when Walter returns for cycle 6.         3. Stage 3, UISS-high risk ccRCC: s/p R nephrectomy   - approximately 40-50% risk of recurrence after definitive surgery.   - no clear evidence of residual disease at this time; the retroperitoneal lymphadenopathy is more consistent with metastatic prostate cancer   - Continue active surveillance with self-reporting for signs/symptoms of recurrence (this was previously explained in detail) and periodic H&P and scans.        Jj Winters MD

## 2022-11-21 NOTE — NURSING NOTE
"Oncology Rooming Note    November 21, 2022 8:55 AM   Walter Reyes is a 62 year old male who presents for:    Chief Complaint   Patient presents with     Blood Draw     Labs drawn via  by RN. VS taken.     Oncology Clinic Visit     Malignant neoplasm of prostate metastatic to bone (H)      Initial Vitals: /86   Pulse 79   Temp 98.4  F (36.9  C) (Oral)   Resp 16   Wt 115.7 kg (255 lb)   SpO2 98%   BMI 35.29 kg/m   Estimated body mass index is 35.29 kg/m  as calculated from the following:    Height as of 5/9/22: 1.811 m (5' 11.28\").    Weight as of this encounter: 115.7 kg (255 lb). Body surface area is 2.41 meters squared.  No Pain (0) Comment: Data Unavailable   No LMP for male patient.  Allergies reviewed: Yes  Medications reviewed: Yes    Medications: Medication refills not needed today.  Pharmacy name entered into EPIC:    [facility] - [place], [place] - [address] [id number] - [place], [place] - [address] [facility] - [place], [place] - [address] [facility] [place], [place] - [phone]    Clinical concerns:  None       Ok Uribe            "

## 2022-11-21 NOTE — PATIENT INSTRUCTIONS
Walter has now been off chemotherapy for about 3-1/2 weeks.  Last PSA was a decline compared to the previous.  A PSMA PET scan done last week reveals persistent yusuf and bony disease that is evident as well as evidence of a lesion in C7 and that will require follow-up.  At this point we do not exactly know how to interpret PSMA avid scans in the context of a declining PSA and in particular, in the case of the cervical spine, in the case of a radiated site.    To be safe, I will repeat MRI of the cervical spine however I am mostly confident that this is going to show evidence of a regressing tumor that is only mildly PSMA avid.    With respect to the larger picture of subsequent therapy we will consider moving forward with lutetium 177 based treatment at the time of PSA progression.  As of this dictation the PSA has not resulted from today however we will assume that it is continuing to decrease slightly.  I am therefore going to ask for another PSA to be done in approximately 6 to 8 weeks.  If we see that the PSA is beginning to rise I will activate the process of ordering the lutetium 177 based treatment.  The other advantage of waiting is that this will give his bone marrow some time to recover from the chemotherapy as well as for him to recover generally from this treatment.    For lutetium 177-PSMA based treatment is likely to result in some marrow suppression as well as dry mouth however does not have quite the same degree of fatigue or neuropathy that we see with chemotherapy.

## 2022-11-21 NOTE — NURSING NOTE
Chief Complaint   Patient presents with     Blood Draw     Labs drawn via  by RN. VS taken.     Labs drawn with  by rn.  Pt tolerated well.  VS taken.  Pt checked in for next appt.    Albert Conteh RN

## 2022-12-06 ENCOUNTER — HOSPITAL ENCOUNTER (OUTPATIENT)
Dept: MRI IMAGING | Facility: CLINIC | Age: 62
Discharge: HOME | End: 2022-12-06
Attending: INTERNAL MEDICINE | Admitting: INTERNAL MEDICINE
Payer: COMMERCIAL

## 2022-12-06 DIAGNOSIS — C61 MALIGNANT NEOPLASM OF PROSTATE METASTATIC TO BONE (H): ICD-10-CM

## 2022-12-06 DIAGNOSIS — C79.51 MALIGNANT NEOPLASM OF PROSTATE METASTATIC TO BONE (H): ICD-10-CM

## 2022-12-06 PROCEDURE — 255N000002 HC RX 255 OP 636: Performed by: INTERNAL MEDICINE

## 2022-12-06 PROCEDURE — 72156 MRI NECK SPINE W/O & W/DYE: CPT

## 2022-12-06 PROCEDURE — A9585 GADOBUTROL INJECTION: HCPCS | Performed by: INTERNAL MEDICINE

## 2022-12-06 RX ORDER — GADOBUTROL 604.72 MG/ML
12 INJECTION INTRAVENOUS ONCE
Status: COMPLETED | OUTPATIENT
Start: 2022-12-06 | End: 2022-12-06

## 2022-12-06 RX ADMIN — GADOBUTROL 12 ML: 604.72 INJECTION INTRAVENOUS at 06:50

## 2022-12-15 ENCOUNTER — PATIENT OUTREACH (OUTPATIENT)
Dept: ONCOLOGY | Facility: CLINIC | Age: 62
End: 2022-12-15

## 2022-12-15 NOTE — PROGRESS NOTES
"[facility]: Cancer Care Short Note                                                                                          Outgoing Communication:     TC to pt to follow up on [employer] message reporting piriformis pain and hip stiffness. Pt describes hips and low back are \"bound up\" and feels the same as last Feb/March, after which he had radiation which relieved it. Also states no nerve pinching like last year but having a hard time putting on socks and shoes. Pt states he did physical therapy without much result. Reports ever since chemo was stopped along with pred, symptoms have gradually increased over the past month. Pt has seen PCP for LE edema and it was determined his circulation is not concerning.  Pt is also concerned with persistent thrush despite fluconazole mouthwash and systemic tx. Is presently scheduled to get PSA checked in Jan and visit with Veda in Feb which he's satisfied with at this time. Told pt that writer will discuss pt's concerns with Dr. Winters and let him know if he has any other suggestions. Pt stated understanding of all and agreed to call clinic with any questions or concerns.           "

## 2022-12-29 NOTE — PROGRESS NOTES
Simulation Note    Date: 4/8/2022     Patient: Walter Reyes    Diagnosis: Malignant neoplasm of prostate metastatic to bone (H)    Type of Simulation:  Palliation     Patient Position: Supine    Patient Immobilization: Custom:  Vac-Loc    Simulation Aid(s): None    Image Acquisition: Conventional CT simulation without 4D    Total Dose Planned: 2500 cGy      Dose/fraction:500 cGy    Energy of machine:  18 MV           Type of Radiotherapy Technique: 3D multiple fields with custom MLC blocking      Continuing Physcis/Dosimetry  and Dose calculations are ordered.  Simple simulations will be done prior to new start and changes in fields.  Weekly on treat visit.      YVAN Lizarraga M.D.  Department of Radiation Oncology  [facility]

## 2023-01-02 ENCOUNTER — PATIENT OUTREACH (OUTPATIENT)
Dept: ONCOLOGY | Facility: CLINIC | Age: 63
End: 2023-01-02

## 2023-01-02 DIAGNOSIS — K11.8 PAROTID MASS: ICD-10-CM

## 2023-01-02 DIAGNOSIS — C61 MALIGNANT NEOPLASM OF PROSTATE METASTATIC TO BONE (H): Primary | ICD-10-CM

## 2023-01-02 DIAGNOSIS — C79.51 MALIGNANT NEOPLASM OF PROSTATE METASTATIC TO BONE (H): Primary | ICD-10-CM

## 2023-01-02 NOTE — PROGRESS NOTES
TC to pt to follow-up on previous phone contact/My Chart    Pt stated his edema has improved following his PCP d/c'ing his amlodipine and starting him on Maxide-25 - BP has decreased as well. Feels like his hip pain and mobility have improved with reduced edema as well.     Pt does feel like his neuropathy is getting worse in his fingertips and feels more areas of numbness in his feet. He's wondering if this is still rebound from d/c'ing steroids.     Pt also c/o ongoing thrush that has been evaluated and treated by both Veda Greenwood and his PCP. Is wondering if he should go back on oral anti-fungal. Sent pt resources via My Chart regarding thrush management.     Told pt that writer will review with care team and ask if they'd like to see him sooner than TAHMINA appt planned for 02/06.

## 2023-01-09 ENCOUNTER — ONCOLOGY VISIT (OUTPATIENT)
Dept: ONCOLOGY | Facility: CLINIC | Age: 63
End: 2023-01-09
Attending: INTERNAL MEDICINE
Payer: COMMERCIAL

## 2023-01-09 ENCOUNTER — PATIENT OUTREACH (OUTPATIENT)
Dept: ONCOLOGY | Facility: CLINIC | Age: 63
End: 2023-01-09

## 2023-01-09 ENCOUNTER — LAB (OUTPATIENT)
Dept: LAB | Facility: CLINIC | Age: 63
End: 2023-01-09
Payer: COMMERCIAL

## 2023-01-09 VITALS
DIASTOLIC BLOOD PRESSURE: 82 MMHG | BODY MASS INDEX: 36.5 KG/M2 | RESPIRATION RATE: 16 BRPM | SYSTOLIC BLOOD PRESSURE: 129 MMHG | OXYGEN SATURATION: 97 % | HEART RATE: 76 BPM | WEIGHT: 263.8 LBS

## 2023-01-09 DIAGNOSIS — C61 PROSTATE CANCER (H): Primary | ICD-10-CM

## 2023-01-09 DIAGNOSIS — C79.51 MALIGNANT NEOPLASM OF PROSTATE METASTATIC TO BONE (H): ICD-10-CM

## 2023-01-09 DIAGNOSIS — C61 MALIGNANT NEOPLASM OF PROSTATE METASTATIC TO BONE (H): ICD-10-CM

## 2023-01-09 LAB
ALBUMIN SERPL BCG-MCNC: 4.2 G/DL (ref 3.5–5.2)
ALP SERPL-CCNC: 55 U/L (ref 40–129)
ALT SERPL W P-5'-P-CCNC: 18 U/L (ref 10–50)
ANION GAP SERPL CALCULATED.3IONS-SCNC: 12 MMOL/L (ref 7–15)
AST SERPL W P-5'-P-CCNC: 22 U/L (ref 10–50)
BASOPHILS # BLD AUTO: 0 10E3/UL (ref 0–0.2)
BASOPHILS NFR BLD AUTO: 1 %
BILIRUB SERPL-MCNC: 0.2 MG/DL
BUN SERPL-MCNC: 21.7 MG/DL (ref 8–23)
CALCIUM SERPL-MCNC: 9.1 MG/DL (ref 8.8–10.2)
CHLORIDE SERPL-SCNC: 106 MMOL/L (ref 98–107)
CREAT SERPL-MCNC: 1.05 MG/DL (ref 0.67–1.17)
DEPRECATED HCO3 PLAS-SCNC: 25 MMOL/L (ref 22–29)
EOSINOPHIL # BLD AUTO: 0.2 10E3/UL (ref 0–0.7)
EOSINOPHIL NFR BLD AUTO: 4 %
ERYTHROCYTE [DISTWIDTH] IN BLOOD BY AUTOMATED COUNT: 13 % (ref 10–15)
GFR SERPL CREATININE-BSD FRML MDRD: 81 ML/MIN/1.73M2
GLUCOSE SERPL-MCNC: 112 MG/DL (ref 70–99)
HCT VFR BLD AUTO: 39.7 % (ref 40–53)
HGB BLD-MCNC: 12.6 G/DL (ref 13.3–17.7)
IMM GRANULOCYTES # BLD: 0 10E3/UL
IMM GRANULOCYTES NFR BLD: 0 %
LYMPHOCYTES # BLD AUTO: 1.6 10E3/UL (ref 0.8–5.3)
LYMPHOCYTES NFR BLD AUTO: 30 %
MCH RBC QN AUTO: 29.9 PG (ref 26.5–33)
MCHC RBC AUTO-ENTMCNC: 31.7 G/DL (ref 31.5–36.5)
MCV RBC AUTO: 94 FL (ref 78–100)
MONOCYTES # BLD AUTO: 0.6 10E3/UL (ref 0–1.3)
MONOCYTES NFR BLD AUTO: 11 %
NEUTROPHILS # BLD AUTO: 2.9 10E3/UL (ref 1.6–8.3)
NEUTROPHILS NFR BLD AUTO: 54 %
NRBC # BLD AUTO: 0 10E3/UL
NRBC BLD AUTO-RTO: 0 /100
PLATELET # BLD AUTO: 226 10E3/UL (ref 150–450)
POTASSIUM SERPL-SCNC: 4.3 MMOL/L (ref 3.4–5.3)
PROT SERPL-MCNC: 6.7 G/DL (ref 6.4–8.3)
PSA SERPL-MCNC: 0.66 NG/ML (ref 0–4.5)
RBC # BLD AUTO: 4.21 10E6/UL (ref 4.4–5.9)
SODIUM SERPL-SCNC: 143 MMOL/L (ref 136–145)
WBC # BLD AUTO: 5.2 10E3/UL (ref 4–11)

## 2023-01-09 PROCEDURE — 84153 ASSAY OF PSA TOTAL: CPT

## 2023-01-09 PROCEDURE — 99214 OFFICE O/P EST MOD 30 MIN: CPT | Performed by: INTERNAL MEDICINE

## 2023-01-09 PROCEDURE — 80053 COMPREHEN METABOLIC PANEL: CPT

## 2023-01-09 PROCEDURE — 85025 COMPLETE CBC W/AUTO DIFF WBC: CPT

## 2023-01-09 PROCEDURE — 84403 ASSAY OF TOTAL TESTOSTERONE: CPT

## 2023-01-09 PROCEDURE — G0463 HOSPITAL OUTPT CLINIC VISIT: HCPCS | Performed by: INTERNAL MEDICINE

## 2023-01-09 PROCEDURE — 36415 COLL VENOUS BLD VENIPUNCTURE: CPT

## 2023-01-09 PROCEDURE — G0463 HOSPITAL OUTPT CLINIC VISIT: HCPCS

## 2023-01-09 ASSESSMENT — PAIN SCALES - GENERAL: PAINLEVEL: MILD PAIN (2)

## 2023-01-09 NOTE — TELEPHONE ENCOUNTER
[facility]: Cancer Care Short Note                                                                                          Incoming Communication:     Per Dr. Winters, pt should see ENT to follow up on parotid gland finding on MRI as it was seen both on PSMA PET (which can be normal) as well as MRI    Outgoing Communication:     TC to pt who stated understanding not to cancel ENT appt and will keep as planned on 01/16.     Aspen Escamilla, MSN, RN, OCN  Oncology Care Coordinator  [facility]

## 2023-01-09 NOTE — LETTER
"    1/9/2023         RE: Walter Reyes  [address] [address] [facility] [address] [facility] [address]        Dear Colleague,    Thank you for referring your patient, Walter Reyes, to the [facility]. Please see a copy of my visit note below.      [facility] Oncology Followup  Oncologist: Dr. Jj Winters  Jan 9, 2023    REASON FOR VISIT: Follow-up for metastatic castration-resistant prostate cancer, here for cycle 4 Docetaxel    HISTORY OF PRESENT ILLNESS: Mr. 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 His oncologic history is detailed below.     1/6/21  PSA = 0.29  3/31/21 PSA = 0.44  7/7/21  PSA = 0.47  11/2021 PSA = 1.05  -- Start XTANDI  12/16/21 PSA =0.22  2/11/22 PSA= 0.50  3/22/22 PSA=0.72  5/5/2022 PSA=1.79  7/11/2022 PSA=2.16 --Start cycle 1 docetaxel   8/22/22 PSA = 1.36  9/12/22 PSA = 1.09  10/24/22 Cycle #6 of Docetaxel. End of treatment  1/9/23  PSA =0.66    Interval History:.     ONCOLOGIC HISTORY:  1. 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 Naylor at [facility]. Prior PSA were 1.4 on 8/16/17, 2.4 on 6/28/16, 2.9 on 6/28/16, and as low as 0.4 on 3/26/2003.   - 1/08/2019: Consultation with Sarah Wilson CNP in Urology clinic. Repeat PSA 9.6.  - 1/16/2019: MRI prostate with contrast - \"This examination is characterized as PIRADS 5- very high probability. Clinically significant cancer is highly likely to be present. There is a large, invasive mass arising from the right peripheral zone and extending into the neurovascular bundle, seminal vesicle, and along the anterolateral right mesorectal fascia. Metastatic right external iliac lymph node. " "Metastatic lesion in the posterior/superior right acetabulum.\"  - 1/25/2019: CT abdomen and pelvis with IV contrast - \"1. Heterogeneous enhancing mass posteriorly in the upper pole of the right kidney measures 4.5 x 5.8 x 5.7 cm (AP by transverse by craniocaudal). It has a small nodular component extending posterior medially which abuts the right psoas muscle. This nodular extension measures 2.1 x 2.1 cm. Minimal stranding about the mass. No definite thrombus within the right renal vein. This renal mass is compatible with a renal cell carcinoma. A paraaortic lymph node situated immediately posterior to the left renal vein measures 1.1 cm in short axis, suspicious for metastasis. 2. A 2 cm right external iliac lymph node is also suspicious for metastases. 3. Multiple sclerotic osseous lesions suspicious for metastases. These include 0.8 and 0.6 cm sclerotic lesions laterally in the right iliac wing (images 53 and 62 respectively). Ill-defined groundglass density laterally in the right acetabulum measuring 1.7 x 1.2 cm corresponds with the lesion identified on MRI. A 0.4 cm groundglass density in the left acetabulum. Sclerotic lesion in the left femoral neck measures 0.9 x 1.6 cm (image 81). Sclerotic metastases would be more compatible with prostate metastases. 4. A 3 subcentimeter hepatic lesions are indeterminate. Metastases would be a consideration. These can be further characterized with liver MRI.\"  - 1/25/2019: NM bone scan - \"There is focal bony uptake in the left femoral neck, right acetabulum, right of midline at the S1 or L5 level of the spine, within multiple bilateral ribs, in the C7 or T1 level of the spine, and anteriorly within the skull. Findings are suspicious for metastases.\"  - 1/31/19: CT chest with contrast - \" No suspicious nodules in the chest.  Stable appearance of a 5.8 cm right renal mass concerning for renal carcinoma until proven otherwise.  Stable indeterminant subcentimeter hypodensities " "in the liver.  Multifocal osteoblastic metastasis including 2.5 cm lesion in the right fifth rib posteriorly and a 1.6 cm lesion in the right third rib posteriorly. No lytic lesions identified.\"  - 2/6/19: CT-guided right sclerotic fifth rib lesion biopsy - \"Metastatic carcinoma, consistent with prostate primary.  Immunohistochemical stains performed show the metastatic carcinoma stains positive for NKX3.1 (prostate marker), negative for STACIE 3 and PAX8, which supports the above diagnosis.\"  - 2/15/19: Started Casodex 50mg every day and consented for the biobanking protocol. 3/18/19 - stopped Casodex.  - 2/19/19: Case discussed in tumor board - recommendation for nephectomy for suspected malignant right renal mass.   - 2/26/19: Started Lupron 22.5mg every 3 months.   - 5/8/19: Started Docetaxel 75mg/m2 IV every 3 weeks. 06/18/19 - cycle 3, 7/10/19 - cycle 4, 07/31/19 - cycle 5, 08/21/19 - cycle 6.   - 10/2/19: Restaging CT CAP and NM Bone Scan showed improved osseous disease, no evidence of recurrent or new metastatic disease.  - 1/5/20: Restaging CT CAP and NM Bone Scan showed stable osseous disease, no evidence of recurrent or new metastatic disease.  - 4/6/20: NM bone scan with slightly improved uptake in known skeletal mets. CT C/A/P with contrast with stable osseous mets, no yusuf enlargement, no new visceral mets etc.  - 04/08/20: Zometa every 3 months.  - 10/5/20: CT C/A/P with contrast and NM bone scan - SD.   - 1/4/21: CT C/A/P with contrast and NM bone scan - SD but slight increase in right 5th rib tracer uptake and in posterior L5 sclerosis.   - 03/29/21: CT C/A/P with contrast - single new right sacral 8mm bone lesion (suspicious), other bone mets stable. No visceral/yusuf disease. Nephrectomy site without recurrence. NM bone scan - SD but \"increased uptake associated with the prior lesions of the lower  cervical spine, posterior right fourth rib and right L5 posterior arch elements. Otherwise unchanged " "uptake associated with the proximal left femur and left anterior fourth rib. Similar uptake of the left halux, possibly secondary to degenerative osteoarthritis or gout.\"  -  START XTANDI  -  RT to L5 / S1  -  STOPPED XTANDI   -07/11/2022  START Cycle 1 Docetaxel     9/12/22  PSA =1.09  10/3/22  PSA= 0.95  10/24/22  PSA= 0.81 ( Last dose of Docetaxel --Cycle #6)  11/21/22  PSA =0.89    2. Stage III ([id number]), grade 3 of 4, clear-cell RCC of right kidney:  - Incidentally diagnosed as above.   - Underwent curative-intent robotic right radical nephrectomy with Dr. Wesley Cleveland on 3/19/19. No tumor spillage per op report.   - Path showed: \"Histologic Type: Clear cell renal cell carcinoma; Sarcomatoid Features: Not identified; Histologic Grade: Nucleolar grade 3 (WHO/ISUP). Extent Tumor Size: 4.3 cm. Microscopic Tumor Extension: Tumor extension into renal sinus (in vascular structures). Margins: Negative. Tumor Necrosis: Present; focal. Lymph-Vascular Invasion: Not identified.  Pathologic Staging (pTNM) Primary Tumor (pT): pT3a: Tumor extends into renal vein branches/renal sinus. Regional Lymph Nodes (pN): pNX. Number of Lymph Nodes Examined: 0 Distant Metastasis (pM): pM N/A.\"  - Restaging scans as above.    INTERVAL HISTORY:   Walter presents for oncology follow-up visit today prior to Cycle 6 Docetaxel. Doing well overall. (+) fatigue   (+) low back pain and hip tightness. Worse with standing.   No N/T in legs  Hand neuropathy      PAST MEDICAL HISTORY:  Past Medical History:   Diagnosis Date     Cancer of kidney, right (H)      Complication of anesthesia     slow wakeup      Malignant neoplasm of prostate metastatic to bone (H) 2/14/2019     Thyroid disease      PAST SURGICAL HISTORY:   Past Surgical History:   Procedure Laterality Date     CYSTOSCOPY      about 10 years ago     INSERT PORT VASCULAR ACCESS Right 5/2/2019    Procedure: Chest Port Placement;  Surgeon: Tate Burciaga PA-C;  " Location: [facility] OR     IR CHEST PORT PLACEMENT > 5 YRS OF AGE  5/2/2019     LAPAROSCOPIC NEPHRECTOMY Right 3/19/2019    Procedure: Right laparoscopic radical nephrectomy;  Surgeon: Wesley Cleveland MD;  Location: [facility] OR      SOCIAL HISTORY:   Social History     Tobacco Use     Smoking status: Never     Smokeless tobacco: Never   Substance Use Topics     Drug use: No     FAMILY HISTORY:   Family History   Problem Relation Age of Onset     Diabetes Father      ALLERGIES:   Allergies   Allergen Reactions     Amlodipine Other (See Comments)     Leg swelling     CURRENT MEDICATIONS:   Current Outpatient Medications:      amLODIPine (NORVASC) 10 MG tablet, Take 1 tablet (10 mg) by mouth daily (Patient not taking: Reported on 1/9/2023), Disp: 30 tablet, Rfl: 0     atorvastatin (LIPITOR) 20 MG tablet, Take 60 mg by mouth daily , Disp: , Rfl:      Barberry-Oreg Grape-Goldenseal (BERBERINE COMPLEX PO), , Disp: , Rfl:      calcium citrate-vitamin D (CITRACAL) 315-250 MG-UNIT TABS per tablet, Take 650 mg by mouth 2 times daily , Disp: , Rfl:      cycloSPORINE (RESTASIS) 0.05 % ophthalmic emulsion, Place 1 drop into both eyes 2 times daily , Disp: , Rfl:      dexamethasone (DECADRON) 4 MG tablet, Take 2 tablets (8 mg) by mouth 2 times daily (with meals) Start evening of Docetaxel infusion and continue for a total of 3 doses., Disp: 6 tablet, Rfl: 0     fluconazole (DIFLUCAN) 200 MG tablet, Take 1 tablet (200 mg) by mouth daily, Disp: 60 tablet, Rfl: 1     Glucosamine-Chondroit-Vit C-Mn (GLUCOSAMINE 1500 COMPLEX) CAPS, Take 1 capsule by mouth daily, Disp: , Rfl:      levothyroxine (SYNTHROID/LEVOTHROID) 112 MCG tablet, Take 112 mcg by mouth daily, Disp: , Rfl:      loratadine (CLARITIN) 10 MG tablet, , Disp: , Rfl:      MAGNESIUM PO, Take 250 mg by mouth 2 times daily , Disp: , Rfl:      MEBENDAZOLE PO, Take 225 mg by mouth daily , Disp: , Rfl:      metFORMIN (GLUCOPHAGE XR) 500 MG 24 hr tablet, Take 1 tablet with  breakfast, 2 tablets with dinner., Disp: , Rfl:      Multiple Vitamins-Minerals (MULTIVITAMIN ADULT EXTRA C PO), Take 1 tablet by mouth every 24 hours, Disp: , Rfl:      Nutritional Supplements (SALMON OIL) CAPS, Take 2 capsules by mouth daily , Disp: , Rfl:      nystatin (MYCOSTATIN) 983285 UNIT/ML suspension, Take 5 mLs (500,000 Units) by mouth 4 times daily Swish and Swallow, Disp: 200 mL, Rfl: 1     omeprazole (PRILOSEC) 20 MG DR capsule, Take 20 mg by mouth daily, Disp: , Rfl:      predniSONE (DELTASONE) 5 MG tablet, Take 1 tablet (5 mg) by mouth 2 times daily, Disp: 42 tablet, Rfl: 0     prochlorperazine (COMPAZINE) 10 MG tablet, Take 1 tablet (10 mg) by mouth every 6 hours as needed for nausea or vomiting, Disp: 30 tablet, Rfl: 2     prochlorperazine (COMPAZINE) 10 MG tablet, Take 1 tablet (10 mg) by mouth every 6 hours as needed for nausea or vomiting, Disp: 30 tablet, Rfl: 3     tamsulosin (FLOMAX) 0.4 MG capsule, Take 1 capsule (0.4 mg) by mouth daily, Disp: 30 capsule, Rfl: 3    Physical Exam:   There were no vitals taken for this visit.   General: well appearing, no acute distress, pleasant male   MSK: full range of motion in all four extremities, no peripheral edema  Neuro: alert and oriented x3, CN grossly intact       ECOG PS 1.    LABORATORY DATA:  Most Recent 3 CBC's:  Recent Labs   Lab Test 01/09/23  0736 11/21/22  0846 10/24/22  0921   WBC 5.2 6.1 7.0   HGB 12.6* 12.6* 12.3*   MCV 94 96 93    216 193   ANEUTAUTO 2.9 4.2 4.6     Most Recent 3 BMP's:  Recent Labs   Lab Test 01/09/23  0736 11/21/22  0846 10/24/22  0921    142 137   POTASSIUM 4.3 4.4 4.1   CHLORIDE 106 107 101   CO2 25 25 26   BUN 21.7 11.8 16.2   CR 1.05 0.93 0.93   ANIONGAP 12 10 10   BETHANY 9.1 9.2 9.4   * 133* 89   PROTTOTAL 6.7 6.5 6.2*   ALBUMIN 4.2 4.1 4.1    Most Recent 3 LFT's:  Recent Labs   Lab Test 01/09/23  0736 11/21/22  0846 10/24/22  0921   AST 22 20 22   ALT 18 22 17   ALKPHOS 55 48 48   BILITOTAL 0.2  "0.3 0.6    Most Recent 2 TSH and T4:  Recent Labs   Lab Test 07/07/21  0834 03/31/21  0824   TSH 1.67 1.63     PSA and Testosterone 09/12/22: pending.    Latest Reference Range & Units 07/07/21 08:34 08/01/22 11:13 08/22/22 10:36   Testosterone Total 240 - 950 ng/dL 6 (L) <2 (L) <2 (L)   (L): Data is abnormally low     Latest Reference Range & Units 05/05/22 11:14 07/11/22 09:22 08/01/22 11:13 08/22/22 10:36   PSA 0.00 - 4.00 ug/L 1.79 2.16 1.78 1.36     I reviewed the above labs today.      12/6/22 repeat MRI  IMPRESSION:   1. Decreased enhancement within the C7 metastasis with decreased  epidural enhancement, consistent with response to therapy.  2. Radiation changes within the adjacent vertebrae.  3. No new metastases.  4. Mild degenerative change.  5. Small mass within the left deep lobe of the left parotid gland with  fluid-fluid level which may represent primary parotid tumor.  6. Slight fullness within the left vallecula which presumably  represents submucosal cyst. Direct visualization could be considered.        ASSESSMENT & PLAN: Mr. Reyes is a delightful 62 year old gentleman with metastatic castration sensitive prostate adenocarcinoma as well as an incidentally diagnosed stage III clear-cell renal cell carcinoma of the right kidney (s/p radical R nephrectomy 3/19/19), who is here for a follow-up visit and initiation of docetaxel for now metastatic castration-resistant prostate cancer.     1. Metastatic castration-resistant prostate cancer, with involvement of the bones and RPLN:   - Patient met the criteria for CHAARTED \"high-volume\" metastatic hormone-sensitive prostate cancer. He opted for docetaxel in combination with ADT (per CHAARTED, [id number], STAMPEDE).   Walter has now been off chemotherapy for about 3-1/2 weeks.  Last PSA was a decline compared to the previous.  A PSMA PET scan done last week reveals persistent yusuf and bony disease that is evident as well as evidence of a lesion in " C7 and that will require follow-up.  At this point we do not exactly know how to interpret PSMA avid scans in the context of a declining PSA and in particular, in the case of the cervical spine, in the case of a radiated site.    MRI C spine is stable  PSA is down  Still some pain in the sacrum, which is bright on the PSMA PET scan    -- we will consider moving forward with lutetium 177 based treatment at the time of PSA progression.   For lutetium 177-PSMA based treatment is likely to result in some marrow suppression as well as dry mouth however does not have quite the same degree of fatigue or neuropathy that we see with chemotherapy.      2. Bone metastases:   - Noted to have osseous metastatic disease at the time of diagnosis. Last bone scan (5/5/22) showed new focus of increased uptake left superior acetabulum and distal left femoral diaphysis; slight increased uptake in the left first rib; stable increased uptake in the other metastatic foci.   - Encouraged to continue calcium and vitamin D supplementation.  - Will continue Zometa 4mg IV every ~3 months, last dosed on 8/2. Will plan to give on 10/25/22 with appointment when Walter returns for cycle 6.         3. Stage 3, UISS-high risk ccRCC: s/p R nephrectomy   - approximately 40-50% risk of recurrence after definitive surgery.   - no clear evidence of residual disease at this time; the retroperitoneal lymphadenopathy is more consistent with metastatic prostate cancer   - Continue active surveillance with self-reporting for signs/symptoms of recurrence (this was previously explained in detail) and periodic H&P and scans.    Jj Winters MD

## 2023-01-09 NOTE — NURSING NOTE
"Oncology Rooming Note    January 9, 2023 1:49 PM   Walter Reyes is a 62 year old male who presents for:    Chief Complaint   Patient presents with     Oncology Clinic Visit     Metastatic Prostate Cancer     Initial Vitals: /82 (BP Location: Right arm, Patient Position: Sitting, Cuff Size: Adult Large)   Pulse 76   Resp 16   Wt 119.7 kg (263 lb 12.8 oz)   SpO2 97%   BMI 36.50 kg/m   Estimated body mass index is 36.5 kg/m  as calculated from the following:    Height as of 5/9/22: 1.811 m (5' 11.28\").    Weight as of this encounter: 119.7 kg (263 lb 12.8 oz). Body surface area is 2.45 meters squared.  Mild Pain (2) Comment: Data Unavailable   No LMP for male patient.  Allergies reviewed: Yes  Medications reviewed: Yes    Medications: Medication refills not needed today.  Pharmacy name entered into EPIC:    [facility] - [place], [place] - [address] [id number] - [place], [place] - [address] [facility] - [place], [place] - [address] [facility] [place] - [address] [facility] [phone]    Clinical concerns: Pt presents today for f/u.       Meena See LPN  1/9/2023              "

## 2023-01-09 NOTE — PROGRESS NOTES
"  [facility] Oncology Followup  Oncologist: Dr. Jj Winters  Jan 9, 2023    REASON FOR VISIT: Follow-up for metastatic castration-resistant prostate cancer, here for cycle 4 Docetaxel    HISTORY OF PRESENT ILLNESS: Mr. 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 His oncologic history is detailed below.     1/6/21  PSA = 0.29  3/31/21 PSA = 0.44  7/7/21  PSA = 0.47  11/2021 PSA = 1.05  -- Start XTANDI  12/16/21 PSA =0.22  2/11/22 PSA= 0.50  3/22/22 PSA=0.72  5/5/2022 PSA=1.79  7/11/2022 PSA=2.16 --Start cycle 1 docetaxel   8/22/22 PSA = 1.36  9/12/22 PSA = 1.09  10/24/22 Cycle #6 of Docetaxel. End of treatment  1/9/23  PSA =0.66    Interval History:.     ONCOLOGIC HISTORY:  1. 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 Naylor at [facility]. Prior PSA were 1.4 on 8/16/17, 2.4 on 6/28/16, 2.9 on 6/28/16, and as low as 0.4 on 3/26/2003.   - 1/08/2019: Consultation with Sarah Wilson CNP in Urology clinic. Repeat PSA 9.6.  - 1/16/2019: MRI prostate with contrast - \"This examination is characterized as PIRADS 5- very high probability. Clinically significant cancer is highly likely to be present. There is a large, invasive mass arising from the right peripheral zone and extending into the neurovascular bundle, seminal vesicle, and along the anterolateral right mesorectal fascia. Metastatic right external iliac lymph node. Metastatic lesion in the posterior/superior right acetabulum.\"  - 1/25/2019: CT abdomen and pelvis with IV contrast - \"1. Heterogeneous enhancing mass posteriorly in the upper pole of the right kidney measures 4.5 x 5.8 x 5.7 cm (AP by transverse by craniocaudal). It has a " "small nodular component extending posterior medially which abuts the right psoas muscle. This nodular extension measures 2.1 x 2.1 cm. Minimal stranding about the mass. No definite thrombus within the right renal vein. This renal mass is compatible with a renal cell carcinoma. A paraaortic lymph node situated immediately posterior to the left renal vein measures 1.1 cm in short axis, suspicious for metastasis. 2. A 2 cm right external iliac lymph node is also suspicious for metastases. 3. Multiple sclerotic osseous lesions suspicious for metastases. These include 0.8 and 0.6 cm sclerotic lesions laterally in the right iliac wing (images 53 and 62 respectively). Ill-defined groundglass density laterally in the right acetabulum measuring 1.7 x 1.2 cm corresponds with the lesion identified on MRI. A 0.4 cm groundglass density in the left acetabulum. Sclerotic lesion in the left femoral neck measures 0.9 x 1.6 cm (image 81). Sclerotic metastases would be more compatible with prostate metastases. 4. A 3 subcentimeter hepatic lesions are indeterminate. Metastases would be a consideration. These can be further characterized with liver MRI.\"  - 1/25/2019: NM bone scan - \"There is focal bony uptake in the left femoral neck, right acetabulum, right of midline at the S1 or L5 level of the spine, within multiple bilateral ribs, in the C7 or T1 level of the spine, and anteriorly within the skull. Findings are suspicious for metastases.\"  - 1/31/19: CT chest with contrast - \" No suspicious nodules in the chest.  Stable appearance of a 5.8 cm right renal mass concerning for renal carcinoma until proven otherwise.  Stable indeterminant subcentimeter hypodensities in the liver.  Multifocal osteoblastic metastasis including 2.5 cm lesion in the right fifth rib posteriorly and a 1.6 cm lesion in the right third rib posteriorly. No lytic lesions identified.\"  - 2/6/19: CT-guided right sclerotic fifth rib lesion biopsy - \"Metastatic " "carcinoma, consistent with prostate primary.  Immunohistochemical stains performed show the metastatic carcinoma stains positive for NKX3.1 (prostate marker), negative for STACIE 3 and PAX8, which supports the above diagnosis.\"  - 2/15/19: Started Casodex 50mg every day and consented for the biobanking protocol. 3/18/19 - stopped Casodex.  - 2/19/19: Case discussed in tumor board - recommendation for nephectomy for suspected malignant right renal mass.   - 2/26/19: Started Lupron 22.5mg every 3 months.   - 5/8/19: Started Docetaxel 75mg/m2 IV every 3 weeks. 06/18/19 - cycle 3, 7/10/19 - cycle 4, 07/31/19 - cycle 5, 08/21/19 - cycle 6.   - 10/2/19: Restaging CT CAP and NM Bone Scan showed improved osseous disease, no evidence of recurrent or new metastatic disease.  - 1/5/20: Restaging CT CAP and NM Bone Scan showed stable osseous disease, no evidence of recurrent or new metastatic disease.  - 4/6/20: NM bone scan with slightly improved uptake in known skeletal mets. CT C/A/P with contrast with stable osseous mets, no yusuf enlargement, no new visceral mets etc.  - 04/08/20: Zometa every 3 months.  - 10/5/20: CT C/A/P with contrast and NM bone scan - SD.   - 1/4/21: CT C/A/P with contrast and NM bone scan - SD but slight increase in right 5th rib tracer uptake and in posterior L5 sclerosis.   - 03/29/21: CT C/A/P with contrast - single new right sacral 8mm bone lesion (suspicious), other bone mets stable. No visceral/yusuf disease. Nephrectomy site without recurrence. NM bone scan - SD but \"increased uptake associated with the prior lesions of the lower  cervical spine, posterior right fourth rib and right L5 posterior arch elements. Otherwise unchanged uptake associated with the proximal left femur and left anterior fourth rib. Similar uptake of the left halux, possibly secondary to degenerative osteoarthritis or gout.\"  -  START XTANDI  -  RT to L5 / S1  -  STOPPED XTANDI   -07/11/2022  START " "Cycle 1 Docetaxel     9/12/22  PSA =1.09  10/3/22  PSA= 0.95  10/24/22  PSA= 0.81 ( Last dose of Docetaxel --Cycle #6)  11/21/22  PSA =0.89    2. Stage III ([id number]), grade 3 of 4, clear-cell RCC of right kidney:  - Incidentally diagnosed as above.   - Underwent curative-intent robotic right radical nephrectomy with Dr. Wesley Cleveland on 3/19/19. No tumor spillage per op report.   - Path showed: \"Histologic Type: Clear cell renal cell carcinoma; Sarcomatoid Features: Not identified; Histologic Grade: Nucleolar grade 3 (WHO/ISUP). Extent Tumor Size: 4.3 cm. Microscopic Tumor Extension: Tumor extension into renal sinus (in vascular structures). Margins: Negative. Tumor Necrosis: Present; focal. Lymph-Vascular Invasion: Not identified.  Pathologic Staging (pTNM) Primary Tumor (pT): pT3a: Tumor extends into renal vein branches/renal sinus. Regional Lymph Nodes (pN): pNX. Number of Lymph Nodes Examined: 0 Distant Metastasis (pM): pM N/A.\"  - Restaging scans as above.    INTERVAL HISTORY:   Walter presents for oncology follow-up visit today prior to Cycle 6 Docetaxel. Doing well overall. (+) fatigue   (+) low back pain and hip tightness. Worse with standing.   No N/T in legs  Hand neuropathy      PAST MEDICAL HISTORY:  Past Medical History:   Diagnosis Date     Cancer of kidney, right (H)      Complication of anesthesia     slow wakeup      Malignant neoplasm of prostate metastatic to bone (H) 2/14/2019     Thyroid disease      PAST SURGICAL HISTORY:   Past Surgical History:   Procedure Laterality Date     CYSTOSCOPY      about 10 years ago     INSERT PORT VASCULAR ACCESS Right 5/2/2019    Procedure: Chest Port Placement;  Surgeon: Tate Burciaga PA-C;  Location: [facility] OR     IR CHEST PORT PLACEMENT > 5 YRS OF AGE  5/2/2019     LAPAROSCOPIC NEPHRECTOMY Right 3/19/2019    Procedure: Right laparoscopic radical nephrectomy;  Surgeon: Wesley Cleveland MD;  Location:  OR      SOCIAL HISTORY:   Social History "     Tobacco Use     Smoking status: Never     Smokeless tobacco: Never   Substance Use Topics     Drug use: No     FAMILY HISTORY:   Family History   Problem Relation Age of Onset     Diabetes Father      ALLERGIES:   Allergies   Allergen Reactions     Amlodipine Other (See Comments)     Leg swelling     CURRENT MEDICATIONS:   Current Outpatient Medications:      amLODIPine (NORVASC) 10 MG tablet, Take 1 tablet (10 mg) by mouth daily (Patient not taking: Reported on 1/9/2023), Disp: 30 tablet, Rfl: 0     atorvastatin (LIPITOR) 20 MG tablet, Take 60 mg by mouth daily , Disp: , Rfl:      Barberry-Oreg Grape-Goldenseal (BERBERINE COMPLEX PO), , Disp: , Rfl:      calcium citrate-vitamin D (CITRACAL) 315-250 MG-UNIT TABS per tablet, Take 650 mg by mouth 2 times daily , Disp: , Rfl:      cycloSPORINE (RESTASIS) 0.05 % ophthalmic emulsion, Place 1 drop into both eyes 2 times daily , Disp: , Rfl:      dexamethasone (DECADRON) 4 MG tablet, Take 2 tablets (8 mg) by mouth 2 times daily (with meals) Start evening of Docetaxel infusion and continue for a total of 3 doses., Disp: 6 tablet, Rfl: 0     fluconazole (DIFLUCAN) 200 MG tablet, Take 1 tablet (200 mg) by mouth daily, Disp: 60 tablet, Rfl: 1     Glucosamine-Chondroit-Vit C-Mn (GLUCOSAMINE 1500 COMPLEX) CAPS, Take 1 capsule by mouth daily, Disp: , Rfl:      levothyroxine (SYNTHROID/LEVOTHROID) 112 MCG tablet, Take 112 mcg by mouth daily, Disp: , Rfl:      loratadine (CLARITIN) 10 MG tablet, , Disp: , Rfl:      MAGNESIUM PO, Take 250 mg by mouth 2 times daily , Disp: , Rfl:      MEBENDAZOLE PO, Take 225 mg by mouth daily , Disp: , Rfl:      metFORMIN (GLUCOPHAGE XR) 500 MG 24 hr tablet, Take 1 tablet with breakfast, 2 tablets with dinner., Disp: , Rfl:      Multiple Vitamins-Minerals (MULTIVITAMIN ADULT EXTRA C PO), Take 1 tablet by mouth every 24 hours, Disp: , Rfl:      Nutritional Supplements (SALMON OIL) CAPS, Take 2 capsules by mouth daily , Disp: , Rfl:      nystatin  (MYCOSTATIN) 157587 UNIT/ML suspension, Take 5 mLs (500,000 Units) by mouth 4 times daily Swish and Swallow, Disp: 200 mL, Rfl: 1     omeprazole (PRILOSEC) 20 MG DR capsule, Take 20 mg by mouth daily, Disp: , Rfl:      predniSONE (DELTASONE) 5 MG tablet, Take 1 tablet (5 mg) by mouth 2 times daily, Disp: 42 tablet, Rfl: 0     prochlorperazine (COMPAZINE) 10 MG tablet, Take 1 tablet (10 mg) by mouth every 6 hours as needed for nausea or vomiting, Disp: 30 tablet, Rfl: 2     prochlorperazine (COMPAZINE) 10 MG tablet, Take 1 tablet (10 mg) by mouth every 6 hours as needed for nausea or vomiting, Disp: 30 tablet, Rfl: 3     tamsulosin (FLOMAX) 0.4 MG capsule, Take 1 capsule (0.4 mg) by mouth daily, Disp: 30 capsule, Rfl: 3    Physical Exam:   There were no vitals taken for this visit.   General: well appearing, no acute distress, pleasant male   MSK: full range of motion in all four extremities, no peripheral edema  Neuro: alert and oriented x3, CN grossly intact       ECOG PS 1.    LABORATORY DATA:  Most Recent 3 CBC's:  Recent Labs   Lab Test 01/09/23 0736 11/21/22  0846 10/24/22  0921   WBC 5.2 6.1 7.0   HGB 12.6* 12.6* 12.3*   MCV 94 96 93    216 193   ANEUTAUTO 2.9 4.2 4.6     Most Recent 3 BMP's:  Recent Labs   Lab Test 01/09/23  0736 11/21/22  0846 10/24/22  0921    142 137   POTASSIUM 4.3 4.4 4.1   CHLORIDE 106 107 101   CO2 25 25 26   BUN 21.7 11.8 16.2   CR 1.05 0.93 0.93   ANIONGAP 12 10 10   BETHANY 9.1 9.2 9.4   * 133* 89   PROTTOTAL 6.7 6.5 6.2*   ALBUMIN 4.2 4.1 4.1    Most Recent 3 LFT's:  Recent Labs   Lab Test 01/09/23  0736 11/21/22  0846 10/24/22  0921   AST 22 20 22   ALT 18 22 17   ALKPHOS 55 48 48   BILITOTAL 0.2 0.3 0.6    Most Recent 2 TSH and T4:  Recent Labs   Lab Test 07/07/21  0834 03/31/21  0824   TSH 1.67 1.63     PSA and Testosterone 09/12/22: pending.    Latest Reference Range & Units 07/07/21 08:34 08/01/22 11:13 08/22/22 10:36   Testosterone Total 240 - 950 ng/dL 6 (L)  "<2 (L) <2 (L)   (L): Data is abnormally low     Latest Reference Range & Units 05/05/22 11:14 07/11/22 09:22 08/01/22 11:13 08/22/22 10:36   PSA 0.00 - 4.00 ug/L 1.79 2.16 1.78 1.36     I reviewed the above labs today.      12/6/22 repeat MRI  IMPRESSION:   1. Decreased enhancement within the C7 metastasis with decreased  epidural enhancement, consistent with response to therapy.  2. Radiation changes within the adjacent vertebrae.  3. No new metastases.  4. Mild degenerative change.  5. Small mass within the left deep lobe of the left parotid gland with  fluid-fluid level which may represent primary parotid tumor.  6. Slight fullness within the left vallecula which presumably  represents submucosal cyst. Direct visualization could be considered.        ASSESSMENT & PLAN: Mr. Reyes is a delightful 62 year old gentleman with metastatic castration sensitive prostate adenocarcinoma as well as an incidentally diagnosed stage III clear-cell renal cell carcinoma of the right kidney (s/p radical R nephrectomy 3/19/19), who is here for a follow-up visit and initiation of docetaxel for now metastatic castration-resistant prostate cancer.     1. Metastatic castration-resistant prostate cancer, with involvement of the bones and RPLN:   - Patient met the criteria for CHAARTED \"high-volume\" metastatic hormone-sensitive prostate cancer. He opted for docetaxel in combination with ADT (per CHAARTED, [id number], STAMPEDE).   Walter has now been off chemotherapy for about 3-1/2 weeks.  Last PSA was a decline compared to the previous.  A PSMA PET scan done last week reveals persistent yusuf and bony disease that is evident as well as evidence of a lesion in C7 and that will require follow-up.  At this point we do not exactly know how to interpret PSMA avid scans in the context of a declining PSA and in particular, in the case of the cervical spine, in the case of a radiated site.    MRI C spine is stable  PSA is down  Still " some pain in the sacrum, which is bright on the PSMA PET scan    -- we will consider moving forward with lutetium 177 based treatment at the time of PSA progression.   For lutetium 177-PSMA based treatment is likely to result in some marrow suppression as well as dry mouth however does not have quite the same degree of fatigue or neuropathy that we see with chemotherapy.      2. Bone metastases:   - Noted to have osseous metastatic disease at the time of diagnosis. Last bone scan (5/5/22) showed new focus of increased uptake left superior acetabulum and distal left femoral diaphysis; slight increased uptake in the left first rib; stable increased uptake in the other metastatic foci.   - Encouraged to continue calcium and vitamin D supplementation.  - Will continue Zometa 4mg IV every ~3 months, last dosed on 8/2. Will plan to give on 10/25/22 with appointment when Walter returns for cycle 6.         3. Stage 3, UISS-high risk ccRCC: s/p R nephrectomy   - approximately 40-50% risk of recurrence after definitive surgery.   - no clear evidence of residual disease at this time; the retroperitoneal lymphadenopathy is more consistent with metastatic prostate cancer   - Continue active surveillance with self-reporting for signs/symptoms of recurrence (this was previously explained in detail) and periodic H&P and scans.    Jj Winters MD

## 2023-01-11 LAB — TESTOST SERPL-MCNC: 6 NG/DL (ref 240–950)

## 2023-01-14 NOTE — PROGRESS NOTES
Dear Dr. Winters:    I had the pleasure of meeting Walter Reyes in consultation today at the [facility] Ot[facility] at your request.     History of Present Illness:   Walter Reyes is a 62 year old man referred for evaluation of an incidental left parotid lesion. He had an MRI of the spine on 12/6/2022 in workup of his prostate cancer which showed a small mass in the left deep lobe of the parotid with fluid level. The patient also had incidental slight fullness of the left vallecula, likely submucosal cyst. Of note, he recently finished docetaxel for his metastatic prostate cancer.    He was unaware of the mass until he had the scan. He has no pain in the area. He has no numbness of his face. He has no issues with facial weakness.    He has no problems with his throat. He does snore at night. He has no sore throat. He has no problems with his swallowing, no sticking of food, no pain.     He has recurrent issues with thrush. He was on prednisone for his chemotherapy. He has not had resolution of the thrush even with being off the prednisone. He has been on fluconazole in the past, most recently about 6-8 weeks ago. He was on this continuously while on chemo.     He is accompanied by his wife.      Past medical history: metastatic prostate cancer, renal cell cancer, hyperlipidemia, hypothyroidism, hypertension    Past surgical history: right radical nephrectomy    Social history:  Nonsmoker. Works as . .     Family history:    MEDICATIONS:     Current Outpatient Medications   Medication Sig Dispense Refill     atorvastatin (LIPITOR) 20 MG tablet Take 60 mg by mouth daily        Barberry-Oreg Grape-Goldenseal (BERBERINE COMPLEX PO)        calcium citrate-vitamin D (CITRACAL) 315-250 MG-UNIT TABS per tablet Take 650 mg by mouth 2 times daily        cycloSPORINE (RESTASIS) 0.05 % ophthalmic emulsion Place 1 drop into both eyes 2 times daily        fluconazole  (DIFLUCAN) 200 MG tablet Take 1 tablet (200 mg) by mouth daily 60 tablet 1     Glucosamine-Chondroit-Vit C-Mn (GLUCOSAMINE 1500 COMPLEX) CAPS Take 1 capsule by mouth daily       levothyroxine (SYNTHROID/LEVOTHROID) 112 MCG tablet Take 112 mcg by mouth daily       loratadine (CLARITIN) 10 MG tablet        MAGNESIUM PO Take 250 mg by mouth 2 times daily        MEBENDAZOLE PO Take 225 mg by mouth daily        metFORMIN (GLUCOPHAGE XR) 500 MG 24 hr tablet Take 1 tablet with breakfast, 2 tablets with dinner.       Multiple Vitamins-Minerals (MULTIVITAMIN ADULT EXTRA C PO) Take 1 tablet by mouth every 24 hours       Nutritional Supplements (SALMON OIL) CAPS Take 2 capsules by mouth daily        nystatin (MYCOSTATIN) 920291 UNIT/ML suspension Take 5 mLs (500,000 Units) by mouth 4 times daily Swish and Swallow 200 mL 1     omeprazole (PRILOSEC) 20 MG DR capsule Take 20 mg by mouth daily       prochlorperazine (COMPAZINE) 10 MG tablet Take 1 tablet (10 mg) by mouth every 6 hours as needed for nausea or vomiting 30 tablet 2     prochlorperazine (COMPAZINE) 10 MG tablet Take 1 tablet (10 mg) by mouth every 6 hours as needed for nausea or vomiting 30 tablet 3     tamsulosin (FLOMAX) 0.4 MG capsule Take 1 capsule (0.4 mg) by mouth daily 30 capsule 3       ALLERGIES:    Allergies   Allergen Reactions     Amlodipine Other (See Comments)     Leg swelling       HABITS/SOCIAL HISTORY:   Nonsmoker.   Works as .   .     Social History     Socioeconomic History     Marital status:      Spouse name: Not on file     Number of children: 4     Years of education: Not on file     Highest education level: Not on file   Occupational History     Not on file   Tobacco Use     Smoking status: Never     Smokeless tobacco: Never   Substance and Sexual Activity     Alcohol use: Not on file     Comment: wine - very rare     Drug use: No     Sexual activity: Not on file   Other Topics Concern     Parent/sibling w/ CABG, MI  "or angioplasty before 65F 55M? Not Asked   Social History Narrative     Not on file     Social Determinants of Health     Financial Resource Strain: Not on file   Food Insecurity: Not on file   Transportation Needs: Not on file   Physical Activity: Not on file   Stress: Not on file   Social Connections: Not on file   Intimate Partner Violence: Not on file   Housing Stability: Not on file       PAST MEDICAL HISTORY:   Past Medical History:   Diagnosis Date     Cancer of kidney, right (H)      Complication of anesthesia     slow wakeup      Malignant neoplasm of prostate metastatic to bone (H) 2/14/2019     Thyroid disease         PAST SURGICAL HISTORY:   Past Surgical History:   Procedure Laterality Date     CYSTOSCOPY      about 10 years ago     INSERT PORT VASCULAR ACCESS Right 5/2/2019    Procedure: Chest Port Placement;  Surgeon: Tate Burciaga PA-C;  Location: [facility] OR     IR CHEST PORT PLACEMENT > 5 YRS OF AGE  5/2/2019     LAPAROSCOPIC NEPHRECTOMY Right 3/19/2019    Procedure: Right laparoscopic radical nephrectomy;  Surgeon: Wesley Cleveland MD;  Location: [facility] OR       FAMILY HISTORY:    Family History   Problem Relation Age of Onset     Diabetes Father        REVIEW OF SYSTEMS:  12 point ROS was negative other than the symptoms noted above in the HPI.  Patient Supplied Answers to Review of Systems  No flowsheet data found.      PHYSICAL EXAMINATION:   /81 (BP Location: Right arm, Patient Position: Sitting, Cuff Size: Adult Large)   Pulse 76   Temp 97.2  F (36.2  C) (Temporal)   Ht 1.811 m (5' 11.28\")   Wt 118.1 kg (260 lb 4.8 oz)   SpO2 96%   BMI 36.02 kg/m     Appearance:   normal; NAD, age-appropriate appearance, well-developed, normal habitus   Communication:   normal; communicates verbally, normal voice quality   Head/Face:   inspection -  Normal; no scars or visible lesions   Palpation - no facial numbness   Salivary glands -  Normal size, no tenderness, swelling, or palpable " masses   Facial strength -  Normal and symmetric bilateral; H/B I/VI   Skin:  normal, no rash   Ears:  auricle (AD) -  normal  EAC (AD) -  normal  TM (AD) -  Normal, no effusion  auricle (AS) -  normal  EAC (AS) -  normal  TM (AS) -  Normal, no effusion  Normal clinical speech reception HA   Nose:  Ext. inspection -  Normal  Internal Inspection -  Normal mucosa, septum, and turbinates; dry mucosa   Nasopharynx:  normal mucosa, no masses   Oral Cavity:  lips -  Normal mucosa, oral competence, and stoma size   Age-appropriate dentition, healthy gingival mucosa   Hard palate, buccal, floor of mouth mucosa normal   Tongue - normal movement, no lesions, no evidence of thrush - thickened secretions on dorsal surface   Oropharynx:  mucosa -  Normal, no lesions  soft palate -  Normal, no lesions, no asymmetry, normal elevation  tonsils -  Normal, no exudates, no abnormal lesions, symmetric  Base of tongue - normal  Vallecula - clear   Hypopharynx:  Normal pyriform sinus and pharyngeal wall mucosa   No pooled secretions   Larynx:  Epiglottis, AE folds, false vocal cords, true vocal cords, arytenoids normal in appearance, bilaterally mobile cords   Neck: No visible mass or asymmetry   Normal palpation  Normal range of motion   Lymphatic:  no abnormal nodes   Cardiovascular:  warm, pink, well-perfused extremities without swelling, tenderness, or edema   Respiratory:  Normal respiratory effort, no stridor   Neuro/Psych.:  mood/affect -  normal  mental status -  normal       PROCEDURES:   Flexible fiberoptic laryngoscopy: Scope exam was indicated due to possible vallecular cyst. Verbal consent was obtained. The nasal cavity was prepped with an aerosolized solution of topical anesthetic and vasoconstrictive agent. The scope was passed through the anterior nasal cavity and advanced. Inspection of the nasopharynx revealed no gross abnormality. The base of tongue and vallecula are normal. There are no cysts or concerning masses. The  epiglottis, AE folds, false cords, true cords, arytenoids are normal.  Inspection of the larynx revealed bilaterally mobile vocal cords. Pyriform sinuses are symmetric. The airway is patent. Procedure tolerated well with no immediate complications noted.    RESULTS REVIEWED:   I reviewed MRI report    MRI images independently reviewed    I reviewed note from oncology      IMPRESSION AND PLAN:   Walter Reyes is a 62 year old man referred for evaluation of an incidental parotid mass. We will have neuro IR review the imaging and determine if they will be willing to perform a parotid biopsy. We discussed that given the size they may not be able to in which case we would repeat imaging in 3-6 months to ensure stability.    Radiology was concerned about possible vallecular cyst on imaging, nothing concerning on exam today.    He has issues with recurrent thrush while on prednisone. He has no signs of thrush today. He was advised that he should probably replace his toothbrush if he is having issues with recurrent infections.    We will contact him with the recommendations of neuro IR and make an appropriate follow-up plan.      Thank you very much for the opportunity to participate in the care of your patient.      Ananya Jackson MD  Otolaryngology- Head & Neck Surgery      This note was dictated with voice recognition software and then edited. Please excuse any unintentional errors.         CC:  Jj Winters MD  [address] [place] [id number]

## 2023-01-16 ENCOUNTER — OFFICE VISIT (OUTPATIENT)
Dept: OTOLARYNGOLOGY | Facility: CLINIC | Age: 63
End: 2023-01-16
Attending: INTERNAL MEDICINE
Payer: COMMERCIAL

## 2023-01-16 ENCOUNTER — PRE VISIT (OUTPATIENT)
Dept: OTOLARYNGOLOGY | Facility: CLINIC | Age: 63
End: 2023-01-16

## 2023-01-16 VITALS
BODY MASS INDEX: 36.44 KG/M2 | HEIGHT: 71 IN | OXYGEN SATURATION: 96 % | HEART RATE: 76 BPM | WEIGHT: 260.3 LBS | SYSTOLIC BLOOD PRESSURE: 114 MMHG | TEMPERATURE: 97.2 F | DIASTOLIC BLOOD PRESSURE: 81 MMHG

## 2023-01-16 DIAGNOSIS — K11.8 PAROTID MASS: Primary | ICD-10-CM

## 2023-01-16 DIAGNOSIS — C61 MALIGNANT NEOPLASM OF PROSTATE METASTATIC TO BONE (H): ICD-10-CM

## 2023-01-16 DIAGNOSIS — K11.8 PAROTID MASS: ICD-10-CM

## 2023-01-16 DIAGNOSIS — C79.51 MALIGNANT NEOPLASM OF PROSTATE METASTATIC TO BONE (H): ICD-10-CM

## 2023-01-16 PROCEDURE — 99204 OFFICE O/P NEW MOD 45 MIN: CPT | Mod: 25 | Performed by: OTOLARYNGOLOGY

## 2023-01-16 PROCEDURE — 31575 DIAGNOSTIC LARYNGOSCOPY: CPT | Performed by: OTOLARYNGOLOGY

## 2023-01-16 ASSESSMENT — PAIN SCALES - GENERAL: PAINLEVEL: NO PAIN (0)

## 2023-01-16 NOTE — NURSING NOTE
"Blood pressure 114/81, pulse 76, temperature 97.2  F (36.2  C), temperature source Temporal, height 1.811 m (5' 11.28\"), weight 118.1 kg (260 lb 4.8 oz), SpO2 96 %.    Kavitha Asher LPN    "

## 2023-01-16 NOTE — LETTER
1/16/2023       RE: Walter Reyes  [id number]  [facility] [id number]     Dear Colleague,    Thank you for referring your patient, Walter Reyes, to the [facility] EAR NOSE AND THROAT [facility]INIC [place] at [facility]. Please see a copy of my visit note below.    Dear Dr. Winters:    I had the pleasure of meeting Walter Reyes in consultation today at the [facility] Otolaryngology Clinic at your request.     History of Present Illness:   Walter Reyes is a 62 year old man referred for evaluation of an incidental left parotid lesion. He had an MRI of the spine on 12/6/2022 in workup of his prostate cancer which showed a small mass in the left deep lobe of the parotid with fluid level. The patient also had incidental slight fullness of the left vallecula, likely submucosal cyst. Of note, he recently finished docetaxel for his metastatic prostate cancer.    He was unaware of the mass until he had the scan. He has no pain in the area. He has no numbness of his face. He has no issues with facial weakness.    He has no problems with his throat. He does snore at night. He has no sore throat. He has no problems with his swallowing, no sticking of food, no pain.     He has recurrent issues with thrush. He was on prednisone for his chemotherapy. He has not had resolution of the thrush even with being off the prednisone. He has been on fluconazole in the past, most recently about 6-8 weeks ago. He was on this continuously while on chemo.     He is accompanied by his wife.      Past medical history: metastatic prostate cancer, renal cell cancer, hyperlipidemia, hypothyroidism, hypertension    Past surgical history: right radical nephrectomy    Social history:  Nonsmoker. Works as . .     Family history:    MEDICATIONS:     Current Outpatient Medications   Medication Sig Dispense Refill      atorvastatin (LIPITOR) 20 MG tablet Take 60 mg by mouth daily        Barberry-Oreg Grape-Goldenseal (BERBERINE COMPLEX PO)        calcium citrate-vitamin D (CITRACAL) 315-250 MG-UNIT TABS per tablet Take 650 mg by mouth 2 times daily        cycloSPORINE (RESTASIS) 0.05 % ophthalmic emulsion Place 1 drop into both eyes 2 times daily        fluconazole (DIFLUCAN) 200 MG tablet Take 1 tablet (200 mg) by mouth daily 60 tablet 1     Glucosamine-Chondroit-Vit C-Mn (GLUCOSAMINE 1500 COMPLEX) CAPS Take 1 capsule by mouth daily       levothyroxine (SYNTHROID/LEVOTHROID) 112 MCG tablet Take 112 mcg by mouth daily       loratadine (CLARITIN) 10 MG tablet        MAGNESIUM PO Take 250 mg by mouth 2 times daily        MEBENDAZOLE PO Take 225 mg by mouth daily        metFORMIN (GLUCOPHAGE XR) 500 MG 24 hr tablet Take 1 tablet with breakfast, 2 tablets with dinner.       Multiple Vitamins-Minerals (MULTIVITAMIN ADULT EXTRA C PO) Take 1 tablet by mouth every 24 hours       Nutritional Supplements (SALMON OIL) CAPS Take 2 capsules by mouth daily        nystatin (MYCOSTATIN) 012063 UNIT/ML suspension Take 5 mLs (500,000 Units) by mouth 4 times daily Swish and Swallow 200 mL 1     omeprazole (PRILOSEC) 20 MG DR capsule Take 20 mg by mouth daily       prochlorperazine (COMPAZINE) 10 MG tablet Take 1 tablet (10 mg) by mouth every 6 hours as needed for nausea or vomiting 30 tablet 2     prochlorperazine (COMPAZINE) 10 MG tablet Take 1 tablet (10 mg) by mouth every 6 hours as needed for nausea or vomiting 30 tablet 3     tamsulosin (FLOMAX) 0.4 MG capsule Take 1 capsule (0.4 mg) by mouth daily 30 capsule 3       ALLERGIES:    Allergies   Allergen Reactions     Amlodipine Other (See Comments)     Leg swelling       HABITS/SOCIAL HISTORY:   Nonsmoker.   Works as .   .     Social History     Socioeconomic History     Marital status:      Spouse name: Not on file     Number of children: 4     Years of education:  "Not on file     Highest education level: Not on file   Occupational History     Not on file   Tobacco Use     Smoking status: Never     Smokeless tobacco: Never   Substance and Sexual Activity     Alcohol use: Not on file     Comment: wine - very rare     Drug use: No     Sexual activity: Not on file   Other Topics Concern     Parent/sibling w/ CABG, MI or angioplasty before 65F 55M? Not Asked   Social History Narrative     Not on file     Social Determinants of Health     Financial Resource Strain: Not on file   Food Insecurity: Not on file   Transportation Needs: Not on file   Physical Activity: Not on file   Stress: Not on file   Social Connections: Not on file   Intimate Partner Violence: Not on file   Housing Stability: Not on file       PAST MEDICAL HISTORY:   Past Medical History:   Diagnosis Date     Cancer of kidney, right (H)      Complication of anesthesia     slow wakeup      Malignant neoplasm of prostate metastatic to bone (H) 2/14/2019     Thyroid disease         PAST SURGICAL HISTORY:   Past Surgical History:   Procedure Laterality Date     CYSTOSCOPY      about 10 years ago     INSERT PORT VASCULAR ACCESS Right 5/2/2019    Procedure: Chest Port Placement;  Surgeon: Tate Burciaga PA-C;  Location: [facility] OR     IR CHEST PORT PLACEMENT > 5 YRS OF AGE  5/2/2019     LAPAROSCOPIC NEPHRECTOMY Right 3/19/2019    Procedure: Right laparoscopic radical nephrectomy;  Surgeon: Wesley Cleveland MD;  Location: [facility] OR       FAMILY HISTORY:    Family History   Problem Relation Age of Onset     Diabetes Father        REVIEW OF SYSTEMS:  12 point ROS was negative other than the symptoms noted above in the HPI.  Patient Supplied Answers to Review of Systems  No flowsheet data found.      PHYSICAL EXAMINATION:   /81 (BP Location: Right arm, Patient Position: Sitting, Cuff Size: Adult Large)   Pulse 76   Temp 97.2  F (36.2  C) (Temporal)   Ht 1.811 m (5' 11.28\")   Wt 118.1 kg (260 lb 4.8 oz)   " SpO2 96%   BMI 36.02 kg/m     Appearance:   normal; NAD, age-appropriate appearance, well-developed, normal habitus   Communication:   normal; communicates verbally, normal voice quality   Head/Face:   inspection -  Normal; no scars or visible lesions   Palpation - no facial numbness   Salivary glands -  Normal size, no tenderness, swelling, or palpable masses   Facial strength -  Normal and symmetric bilateral; H/B I/VI   Skin:  normal, no rash   Ears:  auricle (AD) -  normal  EAC (AD) -  normal  TM (AD) -  Normal, no effusion  auricle (AS) -  normal  EAC (AS) -  normal  TM (AS) -  Normal, no effusion  Normal clinical speech reception HA   Nose:  Ext. inspection -  Normal  Internal Inspection -  Normal mucosa, septum, and turbinates; dry mucosa   Nasopharynx:  normal mucosa, no masses   Oral Cavity:  lips -  Normal mucosa, oral competence, and stoma size   Age-appropriate dentition, healthy gingival mucosa   Hard palate, buccal, floor of mouth mucosa normal   Tongue - normal movement, no lesions, no evidence of thrush - thickened secretions on dorsal surface   Oropharynx:  mucosa -  Normal, no lesions  soft palate -  Normal, no lesions, no asymmetry, normal elevation  tonsils -  Normal, no exudates, no abnormal lesions, symmetric  Base of tongue - normal  Vallecula - clear   Hypopharynx:  Normal pyriform sinus and pharyngeal wall mucosa   No pooled secretions   Larynx:  Epiglottis, AE folds, false vocal cords, true vocal cords, arytenoids normal in appearance, bilaterally mobile cords   Neck: No visible mass or asymmetry   Normal palpation  Normal range of motion   Lymphatic:  no abnormal nodes   Cardiovascular:  warm, pink, well-perfused extremities without swelling, tenderness, or edema   Respiratory:  Normal respiratory effort, no stridor   Neuro/Psych.:  mood/affect -  normal  mental status -  normal       PROCEDURES:   Flexible fiberoptic laryngoscopy: Scope exam was indicated due to possible vallecular cyst.  Verbal consent was obtained. The nasal cavity was prepped with an aerosolized solution of topical anesthetic and vasoconstrictive agent. The scope was passed through the anterior nasal cavity and advanced. Inspection of the nasopharynx revealed no gross abnormality. The base of tongue and vallecula are normal. There are no cysts or concerning masses. The epiglottis, AE folds, false cords, true cords, arytenoids are normal.  Inspection of the larynx revealed bilaterally mobile vocal cords. Pyriform sinuses are symmetric. The airway is patent. Procedure tolerated well with no immediate complications noted.    RESULTS REVIEWED:   I reviewed MRI report    MRI images independently reviewed    I reviewed note from oncology      IMPRESSION AND PLAN:   Walter Reyes is a 62 year old man referred for evaluation of an incidental parotid mass. We will have neuro IR review the imaging and determine if they will be willing to perform a parotid biopsy. We discussed that given the size they may not be able to in which case we would repeat imaging in 3-6 months to ensure stability.    Radiology was concerned about possible vallecular cyst on imaging, nothing concerning on exam today.    He has issues with recurrent thrush while on prednisone. He has no signs of thrush today. He was advised that he should probably replace his toothbrush if he is having issues with recurrent infections.    We will contact him with the recommendations of neuro IR and make an appropriate follow-up plan.      Thank you very much for the opportunity to participate in the care of your patient.      Ananya Jackson MD  Otolaryngology- Head & Neck Surgery      This note was dictated with voice recognition software and then edited. Please excuse any unintentional errors.       CC:  Jj Winters MD  [address] [facility] [id number]

## 2023-01-25 DIAGNOSIS — K11.8 PAROTID MASS: Primary | ICD-10-CM

## 2023-01-26 ENCOUNTER — TELEPHONE (OUTPATIENT)
Dept: OTOLARYNGOLOGY | Facility: CLINIC | Age: 63
End: 2023-01-26
Payer: COMMERCIAL

## 2023-02-06 ENCOUNTER — APPOINTMENT (OUTPATIENT)
Dept: LAB | Facility: CLINIC | Age: 63
End: 2023-02-06
Attending: INTERNAL MEDICINE
Payer: COMMERCIAL

## 2023-02-06 ENCOUNTER — ONCOLOGY VISIT (OUTPATIENT)
Dept: ONCOLOGY | Facility: CLINIC | Age: 63
End: 2023-02-06
Attending: INTERNAL MEDICINE
Payer: COMMERCIAL

## 2023-02-06 VITALS
RESPIRATION RATE: 16 BRPM | BODY MASS INDEX: 36.52 KG/M2 | OXYGEN SATURATION: 100 % | HEART RATE: 82 BPM | SYSTOLIC BLOOD PRESSURE: 121 MMHG | DIASTOLIC BLOOD PRESSURE: 83 MMHG | TEMPERATURE: 98 F | WEIGHT: 263.9 LBS

## 2023-02-06 DIAGNOSIS — M54.16 LUMBAR RADICULOPATHY: ICD-10-CM

## 2023-02-06 DIAGNOSIS — C61 MALIGNANT NEOPLASM OF PROSTATE METASTATIC TO BONE (H): Primary | ICD-10-CM

## 2023-02-06 DIAGNOSIS — C64.1 RENAL CELL CARCINOMA, RIGHT (H): ICD-10-CM

## 2023-02-06 DIAGNOSIS — C79.51 MALIGNANT NEOPLASM OF PROSTATE METASTATIC TO BONE (H): Primary | ICD-10-CM

## 2023-02-06 DIAGNOSIS — K57.92 DIVERTICULITIS: ICD-10-CM

## 2023-02-06 DIAGNOSIS — G89.3 CANCER RELATED PAIN: ICD-10-CM

## 2023-02-06 LAB
ALBUMIN SERPL BCG-MCNC: 4.2 G/DL (ref 3.5–5.2)
ALP SERPL-CCNC: 49 U/L (ref 40–129)
ALT SERPL W P-5'-P-CCNC: 20 U/L (ref 10–50)
ANION GAP SERPL CALCULATED.3IONS-SCNC: 13 MMOL/L (ref 7–15)
AST SERPL W P-5'-P-CCNC: 36 U/L (ref 10–50)
BASOPHILS # BLD AUTO: 0 10E3/UL (ref 0–0.2)
BASOPHILS NFR BLD AUTO: 1 %
BILIRUB SERPL-MCNC: 0.2 MG/DL
BUN SERPL-MCNC: 16.3 MG/DL (ref 8–23)
CALCIUM SERPL-MCNC: 9.2 MG/DL (ref 8.8–10.2)
CHLORIDE SERPL-SCNC: 104 MMOL/L (ref 98–107)
CREAT SERPL-MCNC: 1.08 MG/DL (ref 0.67–1.17)
DEPRECATED HCO3 PLAS-SCNC: 24 MMOL/L (ref 22–29)
EOSINOPHIL # BLD AUTO: 0.1 10E3/UL (ref 0–0.7)
EOSINOPHIL NFR BLD AUTO: 3 %
ERYTHROCYTE [DISTWIDTH] IN BLOOD BY AUTOMATED COUNT: 12.9 % (ref 10–15)
GFR SERPL CREATININE-BSD FRML MDRD: 79 ML/MIN/1.73M2
GLUCOSE SERPL-MCNC: 104 MG/DL (ref 70–99)
HCT VFR BLD AUTO: 37.7 % (ref 40–53)
HGB BLD-MCNC: 12.4 G/DL (ref 13.3–17.7)
IMM GRANULOCYTES # BLD: 0 10E3/UL
IMM GRANULOCYTES NFR BLD: 0 %
LYMPHOCYTES # BLD AUTO: 1.5 10E3/UL (ref 0.8–5.3)
LYMPHOCYTES NFR BLD AUTO: 35 %
MCH RBC QN AUTO: 28.2 PG (ref 26.5–33)
MCHC RBC AUTO-ENTMCNC: 32.9 G/DL (ref 31.5–36.5)
MCV RBC AUTO: 86 FL (ref 78–100)
MONOCYTES # BLD AUTO: 0.4 10E3/UL (ref 0–1.3)
MONOCYTES NFR BLD AUTO: 9 %
NEUTROPHILS # BLD AUTO: 2.2 10E3/UL (ref 1.6–8.3)
NEUTROPHILS NFR BLD AUTO: 52 %
NRBC # BLD AUTO: 0 10E3/UL
NRBC BLD AUTO-RTO: 0 /100
PLATELET # BLD AUTO: 232 10E3/UL (ref 150–450)
POTASSIUM SERPL-SCNC: 4.2 MMOL/L (ref 3.4–5.3)
PROT SERPL-MCNC: 6.7 G/DL (ref 6.4–8.3)
PSA SERPL-MCNC: 0.86 NG/ML (ref 0–4.5)
RBC # BLD AUTO: 4.39 10E6/UL (ref 4.4–5.9)
SODIUM SERPL-SCNC: 141 MMOL/L (ref 136–145)
WBC # BLD AUTO: 4.1 10E3/UL (ref 4–11)

## 2023-02-06 PROCEDURE — 36591 DRAW BLOOD OFF VENOUS DEVICE: CPT

## 2023-02-06 PROCEDURE — 80053 COMPREHEN METABOLIC PANEL: CPT

## 2023-02-06 PROCEDURE — 99207 PR NO BILLABLE SERVICE THIS VISIT: CPT

## 2023-02-06 PROCEDURE — 250N000011 HC RX IP 250 OP 636: Performed by: INTERNAL MEDICINE

## 2023-02-06 PROCEDURE — G0463 HOSPITAL OUTPT CLINIC VISIT: HCPCS

## 2023-02-06 PROCEDURE — 99215 OFFICE O/P EST HI 40 MIN: CPT | Performed by: NURSE PRACTITIONER

## 2023-02-06 PROCEDURE — 250N000011 HC RX IP 250 OP 636: Performed by: NURSE PRACTITIONER

## 2023-02-06 PROCEDURE — G0463 HOSPITAL OUTPT CLINIC VISIT: HCPCS | Mod: 25 | Performed by: NURSE PRACTITIONER

## 2023-02-06 PROCEDURE — 85025 COMPLETE CBC W/AUTO DIFF WBC: CPT

## 2023-02-06 PROCEDURE — 84403 ASSAY OF TOTAL TESTOSTERONE: CPT

## 2023-02-06 PROCEDURE — 96365 THER/PROPH/DIAG IV INF INIT: CPT

## 2023-02-06 PROCEDURE — 258N000003 HC RX IP 258 OP 636: Performed by: INTERNAL MEDICINE

## 2023-02-06 PROCEDURE — 96402 CHEMO HORMON ANTINEOPL SQ/IM: CPT

## 2023-02-06 PROCEDURE — 84153 ASSAY OF PSA TOTAL: CPT

## 2023-02-06 RX ORDER — ZOLEDRONIC ACID 0.04 MG/ML
4 INJECTION, SOLUTION INTRAVENOUS ONCE
Status: COMPLETED | OUTPATIENT
Start: 2023-02-06 | End: 2023-02-06

## 2023-02-06 RX ORDER — ZOLEDRONIC ACID 0.04 MG/ML
4 INJECTION, SOLUTION INTRAVENOUS ONCE
Status: CANCELLED | OUTPATIENT
Start: 2023-02-08 | End: 2023-02-08

## 2023-02-06 RX ORDER — HEPARIN SODIUM (PORCINE) LOCK FLUSH IV SOLN 100 UNIT/ML 100 UNIT/ML
5 SOLUTION INTRAVENOUS EVERY 8 HOURS
Status: DISCONTINUED | OUTPATIENT
Start: 2023-02-06 | End: 2023-02-06 | Stop reason: HOSPADM

## 2023-02-06 RX ORDER — HEPARIN SODIUM (PORCINE) LOCK FLUSH IV SOLN 100 UNIT/ML 100 UNIT/ML
5 SOLUTION INTRAVENOUS
Status: CANCELLED | OUTPATIENT
Start: 2023-02-08

## 2023-02-06 RX ORDER — HEPARIN SODIUM,PORCINE 10 UNIT/ML
5 VIAL (ML) INTRAVENOUS
Status: CANCELLED | OUTPATIENT
Start: 2023-02-08

## 2023-02-06 RX ORDER — HEPARIN SODIUM (PORCINE) LOCK FLUSH IV SOLN 100 UNIT/ML 100 UNIT/ML
5 SOLUTION INTRAVENOUS
Status: DISCONTINUED | OUTPATIENT
Start: 2023-02-06 | End: 2023-02-06 | Stop reason: HOSPADM

## 2023-02-06 RX ADMIN — LEUPROLIDE ACETATE 22.5 MG: KIT at 10:38

## 2023-02-06 RX ADMIN — SODIUM CHLORIDE 250 ML: 9 INJECTION, SOLUTION INTRAVENOUS at 10:14

## 2023-02-06 RX ADMIN — Medication 5 ML: at 09:04

## 2023-02-06 RX ADMIN — Medication 5 ML: at 10:36

## 2023-02-06 RX ADMIN — ZOLEDRONIC ACID 4 MG: 0.04 INJECTION, SOLUTION INTRAVENOUS at 10:14

## 2023-02-06 ASSESSMENT — PAIN SCALES - GENERAL: PAINLEVEL: MODERATE PAIN (4)

## 2023-02-06 NOTE — PATIENT INSTRUCTIONS
February 2023 Sunday Monday Tuesday Wednesday Thursday Friday Saturday                  1     2     3     4       5     6    LAB CENTRAL   9:00 AM   (15 min.)    [facility] DRAW   [facility]    RETURN   9:15 AM   (45 min.)   Veda Greenwood CNP   [facility]    ONC INFUSION 1.5 HR (90 MIN)  10:30 AM   (90 min.)    ONC INFUSION NURSE   [facility] 7     8     9     10     11       12     13     14     15     16     17     18       19     20     21     22     23     24     25       26     27  Happy Birthday!     28 March 2023 Sunday Monday Tuesday Wednesday Thursday Friday Saturday                  1     2     3     4       5     6     7     8     9     10     11       12     13     14     15     16     17     18       19     20     21     22     23     24     25       26     27     28     29     30     31                            Lab Results:  Recent Results (from the past 12 hour(s))   Comprehensive metabolic panel    Collection Time: 02/06/23  9:02 AM   Result Value Ref Range    Sodium 141 136 - 145 mmol/L    Potassium 4.2 3.4 - 5.3 mmol/L    Chloride 104 98 - 107 mmol/L    Carbon Dioxide (CO2) 24 22 - 29 mmol/L    Anion Gap 13 7 - 15 mmol/L    Urea Nitrogen 16.3 8.0 - 23.0 mg/dL    Creatinine 1.08 0.67 - 1.17 mg/dL    Calcium 9.2 8.8 - 10.2 mg/dL    Glucose 104 (H) 70 - 99 mg/dL    Alkaline Phosphatase 49 40 - 129 U/L    AST 36 10 - 50 U/L    ALT 20 10 - 50 U/L    Protein Total 6.7 6.4 - 8.3 g/dL    Albumin 4.2 3.5 - 5.2 g/dL    Bilirubin Total 0.2 <=1.2 mg/dL    GFR Estimate 79 >60 mL/min/1.73m2   PSA tumor marker    Collection Time: 02/06/23  9:02 AM   Result Value Ref Range    PSA Tumor Marker 0.86 0.00 - 4.50 ng/mL   CBC with platelets and differential    Collection Time: 02/06/23  9:02 AM   Result Value Ref Range    WBC Count 4.1 4.0 - 11.0 10e3/uL    RBC Count 4.39  (L) 4.40 - 5.90 10e6/uL    Hemoglobin 12.4 (L) 13.3 - 17.7 g/dL    Hematocrit 37.7 (L) 40.0 - 53.0 %    MCV 86 78 - 100 fL    MCH 28.2 26.5 - 33.0 pg    MCHC 32.9 31.5 - 36.5 g/dL    RDW 12.9 10.0 - 15.0 %    Platelet Count 232 150 - 450 10e3/uL    % Neutrophils 52 %    % Lymphocytes 35 %    % Monocytes 9 %    % Eosinophils 3 %    % Basophils 1 %    % Immature Granulocytes 0 %    NRBCs per 100 WBC 0 <1 /100    Absolute Neutrophils 2.2 1.6 - 8.3 10e3/uL    Absolute Lymphocytes 1.5 0.8 - 5.3 10e3/uL    Absolute Monocytes 0.4 0.0 - 1.3 10e3/uL    Absolute Eosinophils 0.1 0.0 - 0.7 10e3/uL    Absolute Basophils 0.0 0.0 - 0.2 10e3/uL    Absolute Immature Granulocytes 0.0 <=0.4 10e3/uL    Absolute NRBCs 0.0 10e3/uL

## 2023-02-06 NOTE — PROGRESS NOTES
Infusion Nursing Note:  Walter Reyes presents today for C5D1 Lupron/Zometa.    Patient seen by provider today: Yes: Veda ALFONSO   present during visit today: Not Applicable.    Note: Appointment is not made yet by the time patient left the facility. Instructed patient if unable to see next few days to call . Verbalized understanding.    Intravenous Access:  Implanted Port.    Treatment Conditions:  Lab Results   Component Value Date    HGB 12.4 (L) 02/06/2023    WBC 4.1 02/06/2023    ANEU 5.0 07/07/2021    ANEUTAUTO 2.2 02/06/2023     02/06/2023      Lab Results   Component Value Date     02/06/2023    POTASSIUM 4.2 02/06/2023    CR 1.08 02/06/2023    BETHANY 9.2 02/06/2023    BILITOTAL 0.2 02/06/2023    ALBUMIN 4.2 02/06/2023    ALT 20 02/06/2023    AST 36 02/06/2023     Results reviewed, labs MET treatment parameters, ok to proceed with treatment.    Post Infusion Assessment:  Patient tolerated infusion without incident.  Patient tolerated one Lupron injection at left ventrogluteal without incident.  Blood return noted pre and post infusion.  Site patent and intact, free from redness, edema or discomfort.  No evidence of extravasations.  Access discontinued per protocol.     Discharge Plan:   Patient declined prescription refills.  Discharge instructions reviewed with: Patient.  Patient and/or family verbalized understanding of discharge instructions and all questions answered.  AVS to patient via [employer].  Patient will return in 3 months for next appointment. See notes above.  Patient discharged in stable condition accompanied by: self and wife.  Departure Mode: Ambulatory.      ANDRESSA GAMBOA RN

## 2023-02-06 NOTE — PROGRESS NOTES
"  [facility] Oncology Followup  Oncologist: Dr. Jj Winters  Feb 6, 2023    REASON FOR VISIT: Follow-up for metastatic castration-resistant prostate cancer, here for cycle 4 Docetaxel    HISTORY OF PRESENT ILLNESS: Mr. 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 His oncologic history is detailed below.     1/6/21  PSA = 0.29  3/31/21 PSA = 0.44  7/7/21  PSA = 0.47  11/2021 PSA = 1.05  -- Start XTANDI  12/16/21 PSA =0.22  2/11/22 PSA= 0.50  3/22/22 PSA=0.72  5/5/2022 PSA=1.79  7/11/2022 PSA=2.16 --Start cycle 1 docetaxel   8/22/22 PSA = 1.36  9/12/22 PSA = 1.09  10/24/22 Cycle #6 of Docetaxel. End of treatment  1/9/23  PSA =0.66    Interval History:.     ONCOLOGIC HISTORY:  1. 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 Naylor at [facility]. Prior PSA were 1.4 on 8/16/17, 2.4 on 6/28/16, 2.9 on 6/28/16, and as low as 0.4 on 3/26/2003.   - 1/08/2019: Consultation with Sarah Wilson CNP in Urology clinic. Repeat PSA 9.6.  - 1/16/2019: MRI prostate with contrast - \"This examination is characterized as PIRADS 5- very high probability. Clinically significant cancer is highly likely to be present. There is a large, invasive mass arising from the right peripheral zone and extending into the neurovascular bundle, seminal vesicle, and along the anterolateral right mesorectal fascia. Metastatic right external iliac lymph node. Metastatic lesion in the posterior/superior right acetabulum.\"  - 1/25/2019: CT abdomen and pelvis with IV contrast - \"1. Heterogeneous enhancing mass posteriorly in the upper pole of the right kidney measures 4.5 x 5.8 x 5.7 cm (AP by transverse by craniocaudal). It has a " "small nodular component extending posterior medially which abuts the right psoas muscle. This nodular extension measures 2.1 x 2.1 cm. Minimal stranding about the mass. No definite thrombus within the right renal vein. This renal mass is compatible with a renal cell carcinoma. A paraaortic lymph node situated immediately posterior to the left renal vein measures 1.1 cm in short axis, suspicious for metastasis. 2. A 2 cm right external iliac lymph node is also suspicious for metastases. 3. Multiple sclerotic osseous lesions suspicious for metastases. These include 0.8 and 0.6 cm sclerotic lesions laterally in the right iliac wing (images 53 and 62 respectively). Ill-defined groundglass density laterally in the right acetabulum measuring 1.7 x 1.2 cm corresponds with the lesion identified on MRI. A 0.4 cm groundglass density in the left acetabulum. Sclerotic lesion in the left femoral neck measures 0.9 x 1.6 cm (image 81). Sclerotic metastases would be more compatible with prostate metastases. 4. A 3 subcentimeter hepatic lesions are indeterminate. Metastases would be a consideration. These can be further characterized with liver MRI.\"  - 1/25/2019: NM bone scan - \"There is focal bony uptake in the left femoral neck, right acetabulum, right of midline at the S1 or L5 level of the spine, within multiple bilateral ribs, in the C7 or T1 level of the spine, and anteriorly within the skull. Findings are suspicious for metastases.\"  - 1/31/19: CT chest with contrast - \" No suspicious nodules in the chest.  Stable appearance of a 5.8 cm right renal mass concerning for renal carcinoma until proven otherwise.  Stable indeterminant subcentimeter hypodensities in the liver.  Multifocal osteoblastic metastasis including 2.5 cm lesion in the right fifth rib posteriorly and a 1.6 cm lesion in the right third rib posteriorly. No lytic lesions identified.\"  - 2/6/19: CT-guided right sclerotic fifth rib lesion biopsy - \"Metastatic " "carcinoma, consistent with prostate primary.  Immunohistochemical stains performed show the metastatic carcinoma stains positive for NKX3.1 (prostate marker), negative for STACIE 3 and PAX8, which supports the above diagnosis.\"  - 2/15/19: Started Casodex 50mg every day and consented for the biobanking protocol. 3/18/19 - stopped Casodex.  - 2/19/19: Case discussed in tumor board - recommendation for nephectomy for suspected malignant right renal mass.   - 2/26/19: Started Lupron 22.5mg every 3 months.   - 5/8/19: Started Docetaxel 75mg/m2 IV every 3 weeks. 06/18/19 - cycle 3, 7/10/19 - cycle 4, 07/31/19 - cycle 5, 08/21/19 - cycle 6.   - 10/2/19: Restaging CT CAP and NM Bone Scan showed improved osseous disease, no evidence of recurrent or new metastatic disease.  - 1/5/20: Restaging CT CAP and NM Bone Scan showed stable osseous disease, no evidence of recurrent or new metastatic disease.  - 4/6/20: NM bone scan with slightly improved uptake in known skeletal mets. CT C/A/P with contrast with stable osseous mets, no yusuf enlargement, no new visceral mets etc.  - 04/08/20: Zometa every 3 months.  - 10/5/20: CT C/A/P with contrast and NM bone scan - SD.   - 1/4/21: CT C/A/P with contrast and NM bone scan - SD but slight increase in right 5th rib tracer uptake and in posterior L5 sclerosis.   - 03/29/21: CT C/A/P with contrast - single new right sacral 8mm bone lesion (suspicious), other bone mets stable. No visceral/yusuf disease. Nephrectomy site without recurrence. NM bone scan - SD but \"increased uptake associated with the prior lesions of the lower  cervical spine, posterior right fourth rib and right L5 posterior arch elements. Otherwise unchanged uptake associated with the proximal left femur and left anterior fourth rib. Similar uptake of the left halux, possibly secondary to degenerative osteoarthritis or gout.\"  -  START XTANDI  -  RT to L5 / S1  -  STOPPED XTANDI   -07/11/2022  START " "Cycle 1 Docetaxel     9/12/22  PSA =1.09  10/3/22  PSA= 0.95  10/24/22  PSA= 0.81 ( Last dose of Docetaxel --Cycle #6)  11/21/22  PSA =0.89  1/9/23   PSA=0.66  2/6/23   PSA=pending    2. Stage III ([id number]), grade 3 of 4, clear-cell RCC of right kidney:  - Incidentally diagnosed as above.   - Underwent curative-intent robotic right radical nephrectomy with Dr. Wesley Cleveland on 3/19/19. No tumor spillage per op report.   - Path showed: \"Histologic Type: Clear cell renal cell carcinoma; Sarcomatoid Features: Not identified; Histologic Grade: Nucleolar grade 3 (WHO/ISUP). Extent Tumor Size: 4.3 cm. Microscopic Tumor Extension: Tumor extension into renal sinus (in vascular structures). Margins: Negative. Tumor Necrosis: Present; focal. Lymph-Vascular Invasion: Not identified.  Pathologic Staging (pTNM) Primary Tumor (pT): pT3a: Tumor extends into renal vein branches/renal sinus. Regional Lymph Nodes (pN): pNX. Number of Lymph Nodes Examined: 0 Distant Metastasis (pM): pM N/A.\"  - Restaging scans as above.    INTERVAL HISTORY:   Walter is here for follow up. He has radiculopathy down his left leg, sharp at his tailbone/low back then aches down the leg. No saddle numbness or incontinence. He is managing with tylenol 1g q6 hours. Neuropathy from chemo actually better with starting b12.     PAST MEDICAL HISTORY:  Past Medical History:   Diagnosis Date     Cancer of kidney, right (H)      Complication of anesthesia     slow wakeup      Malignant neoplasm of prostate metastatic to bone (H) 2/14/2019     Thyroid disease      PAST SURGICAL HISTORY:   Past Surgical History:   Procedure Laterality Date     CYSTOSCOPY      about 10 years ago     INSERT PORT VASCULAR ACCESS Right 5/2/2019    Procedure: Chest Port Placement;  Surgeon: Tate Burciaga PA-C;  Location: [facility] OR     IR CHEST PORT PLACEMENT > 5 YRS OF AGE  5/2/2019     LAPAROSCOPIC NEPHRECTOMY Right 3/19/2019    Procedure: Right laparoscopic radical nephrectomy;  " Surgeon: Wesley Cleveland MD;  Location:  OR      SOCIAL HISTORY:   Social History     Tobacco Use     Smoking status: Never     Smokeless tobacco: Never   Substance Use Topics     Drug use: No     FAMILY HISTORY:   Family History   Problem Relation Age of Onset     Diabetes Father      ALLERGIES:   Allergies   Allergen Reactions     Amlodipine Other (See Comments)     Leg swelling     CURRENT MEDICATIONS:   Current Outpatient Medications:      atorvastatin (LIPITOR) 20 MG tablet, Take 60 mg by mouth daily , Disp: , Rfl:      Barberry-Oreg Grape-Goldenseal (BERBERINE COMPLEX PO), , Disp: , Rfl:      calcium citrate-vitamin D (CITRACAL) 315-250 MG-UNIT TABS per tablet, Take 650 mg by mouth 2 times daily , Disp: , Rfl:      cycloSPORINE (RESTASIS) 0.05 % ophthalmic emulsion, Place 1 drop into both eyes 2 times daily , Disp: , Rfl:      fluconazole (DIFLUCAN) 200 MG tablet, Take 1 tablet (200 mg) by mouth daily, Disp: 60 tablet, Rfl: 1     Glucosamine-Chondroit-Vit C-Mn (GLUCOSAMINE 1500 COMPLEX) CAPS, Take 1 capsule by mouth daily, Disp: , Rfl:      levothyroxine (SYNTHROID/LEVOTHROID) 112 MCG tablet, Take 112 mcg by mouth daily, Disp: , Rfl:      loratadine (CLARITIN) 10 MG tablet, , Disp: , Rfl:      MAGNESIUM PO, Take 250 mg by mouth 2 times daily , Disp: , Rfl:      MEBENDAZOLE PO, Take 225 mg by mouth daily , Disp: , Rfl:      metFORMIN (GLUCOPHAGE XR) 500 MG 24 hr tablet, Take 1 tablet with breakfast, 2 tablets with dinner., Disp: , Rfl:      Multiple Vitamins-Minerals (MULTIVITAMIN ADULT EXTRA C PO), Take 1 tablet by mouth every 24 hours, Disp: , Rfl:      Nutritional Supplements (SALMON OIL) CAPS, Take 2 capsules by mouth daily , Disp: , Rfl:      nystatin (MYCOSTATIN) 475598 UNIT/ML suspension, Take 5 mLs (500,000 Units) by mouth 4 times daily Swish and Swallow, Disp: 200 mL, Rfl: 1     omeprazole (PRILOSEC) 20 MG DR capsule, Take 20 mg by mouth daily, Disp: , Rfl:      prochlorperazine  (COMPAZINE) 10 MG tablet, Take 1 tablet (10 mg) by mouth every 6 hours as needed for nausea or vomiting, Disp: 30 tablet, Rfl: 2     prochlorperazine (COMPAZINE) 10 MG tablet, Take 1 tablet (10 mg) by mouth every 6 hours as needed for nausea or vomiting, Disp: 30 tablet, Rfl: 3     tamsulosin (FLOMAX) 0.4 MG capsule, Take 1 capsule (0.4 mg) by mouth daily, Disp: 30 capsule, Rfl: 3    Physical Exam:   /83   Pulse 82   Temp 98  F (36.7  C)   Resp 16   Wt 119.7 kg (263 lb 14.4 oz)   SpO2 100%   BMI 36.52 kg/m     General: well appearing, no acute distress, pleasant male   MSK: full range of motion in all four extremities, no peripheral edema  Neuro: alert and oriented x3, CN grossly intact       ECOG PS 1.    LABORATORY DATA:  Most Recent 3 CBC's:  Recent Labs   Lab Test 01/09/23  0736 11/21/22  0846 10/24/22  0921   WBC 5.2 6.1 7.0   HGB 12.6* 12.6* 12.3*   MCV 94 96 93    216 193   ANEUTAUTO 2.9 4.2 4.6     Most Recent 3 BMP's:  Recent Labs   Lab Test 02/06/23  0902 01/09/23  0736 11/21/22  0846    143 142   POTASSIUM 4.2 4.3 4.4   CHLORIDE 104 106 107   CO2 24 25 25   BUN 16.3 21.7 11.8   CR 1.08 1.05 0.93   ANIONGAP 13 12 10   BETHANY 9.2 9.1 9.2   * 112* 133*   PROTTOTAL 6.7 6.7 6.5   ALBUMIN 4.2 4.2 4.1    Most Recent 3 LFT's:  Recent Labs   Lab Test 02/06/23  0902 01/09/23  0736 11/21/22  0846   AST 36 22 20   ALT 20 18 22   ALKPHOS 49 55 48   BILITOTAL 0.2 0.2 0.3    Most Recent 2 TSH and T4:  Recent Labs   Lab Test 07/07/21  0834 03/31/21  0824   TSH 1.67 1.63      Latest Reference Range & Units 11/21/22 08:46 01/09/23 07:36   PSA Tumor Marker 0.00 - 4.50 ng/mL 0.89 0.66   Testosterone Total 240 - 950 ng/dL  6 (L)       I reviewed the above labs today.      12/6/22 repeat MRI  IMPRESSION:   1. Decreased enhancement within the C7 metastasis with decreased  epidural enhancement, consistent with response to therapy.  2. Radiation changes within the adjacent vertebrae.  3. No new  "metastases.  4. Mild degenerative change.  5. Small mass within the left deep lobe of the left parotid gland with  fluid-fluid level which may represent primary parotid tumor.  6. Slight fullness within the left vallecula which presumably  represents submucosal cyst. Direct visualization could be considered.      ASSESSMENT & PLAN: Mr. Reyes is a delightful 62 year old gentleman with metastatic castration sensitive prostate adenocarcinoma as well as an incidentally diagnosed stage III clear-cell renal cell carcinoma of the right kidney (s/p radical R nephrectomy 3/19/19), who is here for a follow-up visit and initiation of docetaxel for now metastatic castration-resistant prostate cancer.     1. Metastatic castration-resistant prostate cancer, with involvement of the bones and RPLN:   - Patient met the criteria for CHAARTED \"high-volume\" metastatic hormone-sensitive prostate cancer. He opted for docetaxel in combination with ADT (per JOSEFA, [id number], STAMPEDE).     Walter has now been off chemotherapy for about 8 weeks.  Last PSA was a decline compared to the previous.  PSMA PET scan Nov 2022 reveals persistent uysuf and bony disease that is evident as well as evidence of a lesion in C7. MRI C spine showed stability. Difficult to interpret PSMA avid scans in the context of a declining PSA in the case of a radiated site. Now with some L5 pain and radiculopathy.   --rad onc referral with consideration of palliative RT to L5, has had RT to Sacrum so discussed more radiation may not be an option  -RTC with me in 6 weeks, Dr. Winters in 12. He will let us know if pain worsening in interim, or other clinical signs of progression.  -- consider moving forward with lutetium 177 based treatment at the time of PSA progression.   --For lutetium 177-PSMA based treatment is likely to result in some marrow suppression as well as dry mouth however does not have quite the same degree of fatigue or neuropathy that we see " with chemotherapy.    2. Bone metastases:   - Noted to have osseous metastatic disease at the time of diagnosis. S/p radiation to C spine and sacrum. New lumbar radiculopathy as above  - Encouraged to continue calcium and vitamin D supplementation.  - Will continue Zometa 4mg IV every ~3 months, give today  - Rx tramadol for pain, otherwise agree with tylenol     3. Stage 3, UISS-high risk ccRCC: s/p R nephrectomy   - approximately 40-50% risk of recurrence after definitive surgery.   - no clear evidence of residual disease at this time; the retroperitoneal lymphadenopathy is more consistent with metastatic prostate cancer   - Continue active surveillance with self-reporting for signs/symptoms of recurrence (this was previously explained in detail) and periodic H&P and scans.    60 minutes spent on the date of the encounter doing chart review, review of test results, interpretation of tests, patient visit, documentation, discussion with other provider(s) and discussion with family     Veda Greenwood CNP on 2/6/2023 at 10:19 AM

## 2023-02-06 NOTE — NURSING NOTE
Chief Complaint   Patient presents with     Port Draw     Power needle. Heparin locked, vitals checked     Karin Burr RN on 2/6/2023 at 8:55 AM

## 2023-02-06 NOTE — NURSING NOTE
"Oncology Rooming Note    February 6, 2023 9:14 AM   Walter Reyes is a 62 year old male who presents for:    Chief Complaint   Patient presents with     Port Draw     Power needle. Heparin locked, vitals checked     Oncology Clinic Visit     Malignant neoplasm of prostate metastatic to bone     Initial Vitals: /83   Pulse 82   Temp 98  F (36.7  C)   Resp 16   Wt 119.7 kg (263 lb 14.4 oz)   SpO2 100%   BMI 36.52 kg/m   Estimated body mass index is 36.52 kg/m  as calculated from the following:    Height as of 1/16/23: 1.811 m (5' 11.28\").    Weight as of this encounter: 119.7 kg (263 lb 14.4 oz). Body surface area is 2.45 meters squared.  Moderate Pain (4) Comment: Data Unavailable   No LMP for male patient.  Allergies reviewed: Yes  Medications reviewed: Yes    Medications: Medication refills not needed today.  Pharmacy name entered into EPIC:    [facility] - [place] - [address] [id number] - [place] [address] [address] [address] [facility] - [place], [place] - [address] [facility] [place], [place] - [phone]    Clinical concerns: Concerns on pain he is experiencing pain in low back that is going down the left leg.        Nohemi [facility]            "

## 2023-02-08 LAB — TESTOST SERPL-MCNC: 6 NG/DL (ref 240–950)

## 2023-02-09 NOTE — PROGRESS NOTES
Department of Radiation Oncology  [address] [address]  [phone]       Radiation Oncology Follow-up Visit  February 10, 2023      Walter Reyes  MRN: [id number]   : 1960     DISEASE TREATED:   Metastatic prostate cancer    RADIATION THERAPY DELIVERED:   1. C7: 3000 cGy in 10 fractions, from 2021 - 2021  2. Sacrum: 2500 cGy in 5 fractions, from 2022 - 2022    SUBJECTIVE:   Walter Reyes is a 62 year old male with a PMH significant for a metastatic prostate cancer status post palliative radiotherapy to disease involving the cervical spine and sacrum as described above. He was recently seen in medical oncology clinic N2 623 where he was noted to have radiculopathy involving the lumbar spine and extending down the left lower extremity. He was referred back to radiation oncology for consideration of palliative treatment to this region.    On exam today, he describes progressively worsening pain within the low back over the past 2 months. He currently describes his pain as a 5-6/10 in severity and remains controlled with as needed acetaminophen. He is able to localize the pain to the left SI joint and reports that it is fairly reproducible on palpation of this area. He has noted some occasional shooting pains down the left lower extremity that have become more progressive over the past several weeks. He denies any lower motor weakness, saddle anesthesia or bowel/bladder incontinence. He has not had any repeat imaging since 2022.    PHYSICAL EXAM:  Weight: 117.9 kg  BP: 134/74  Pulse: 62    General: Healthy-appearing 62-year-old gentleman seated comfortably in a chair in no acute distress  HEENT: NC/AT. EOMI.  Pulmonary: No wheezing, stridor or respiratory distress  Extremities: No pain to palpation along the cervical, thoracic or lumbar spine. Moderate pain to palpation over the left SI  joint.  Skin: Normal color and turgor  Neuro: A/O x3. 5/5 motor strength in bilateral upper/lower extremities. 2+ patellar reflexes. No clonus. Normal gait.    LABS AND IMAGING:  No recent labs or imaging    IMPRESSION:   Mr. Reyes is a 62 year old male with a metastatic prostate adenocarcinoma s/p two prior courses of palliative radiotherapy to the cervical spine and sacrum. He presents today with progressive pain involving the left SI joint.    PLAN:   1. Ordered pelvic MRI  2. Follow-up with Dr. Lizarraga after repeat imaging for consideration of sacral reirradiation if radiographic evidence concerning for progressive disease    Alex Schultz MD/PhD    Department of Radiation Oncology  [facility]

## 2023-02-10 ENCOUNTER — OFFICE VISIT (OUTPATIENT)
Dept: RADIATION ONCOLOGY | Facility: CLINIC | Age: 63
End: 2023-02-10
Attending: NURSE PRACTITIONER
Payer: COMMERCIAL

## 2023-02-10 VITALS
DIASTOLIC BLOOD PRESSURE: 74 MMHG | HEART RATE: 62 BPM | SYSTOLIC BLOOD PRESSURE: 134 MMHG | BODY MASS INDEX: 35.98 KG/M2 | WEIGHT: 260 LBS

## 2023-02-10 DIAGNOSIS — M54.16 LUMBAR RADICULOPATHY: ICD-10-CM

## 2023-02-10 DIAGNOSIS — C61 MALIGNANT NEOPLASM OF PROSTATE METASTATIC TO BONE (H): Primary | ICD-10-CM

## 2023-02-10 DIAGNOSIS — C79.51 MALIGNANT NEOPLASM OF PROSTATE METASTATIC TO BONE (H): Primary | ICD-10-CM

## 2023-02-10 PROCEDURE — G0463 HOSPITAL OUTPT CLINIC VISIT: HCPCS | Performed by: RADIOLOGY

## 2023-02-10 PROCEDURE — 99213 OFFICE O/P EST LOW 20 MIN: CPT | Performed by: RADIOLOGY

## 2023-02-10 ASSESSMENT — ENCOUNTER SYMPTOMS
EYES NEGATIVE: 1
DEPRESSION: 1
GASTROINTESTINAL NEGATIVE: 1
CARDIOVASCULAR NEGATIVE: 1
NERVOUS/ANXIOUS: 1
FREQUENCY: 1
MUSCULOSKELETAL NEGATIVE: 1
RESPIRATORY NEGATIVE: 1

## 2023-02-10 NOTE — PROGRESS NOTES
HPI      Review of Systems   Constitutional: Positive for malaise/fatigue.   HENT: Negative.    Eyes: Negative.    Respiratory: Negative.    Cardiovascular: Negative.    Gastrointestinal: Negative.    Genitourinary: Positive for frequency and urgency.   Musculoskeletal: Negative.         Left leg pain and tingling   Neurological:        Unsteady   Endo/Heme/Allergies: Negative.    Psychiatric/Behavioral: Positive for depression. The patient is nervous/anxious.

## 2023-02-10 NOTE — PROGRESS NOTES
INITIAL PATIENT ASSESSMENT    Diagnosis: Cancer    Prior radiation therapy C7 3000 cGy completed 08/18/21: Sacrum 2500 cGy completed 04/18/22    Prior chemotherapy: None    Prior hormonal therapy:No    Pain Eval:  Denies    Psychosocial  Living arrangements: Lives with wife  Fall Risk: independent   referral needs: Not needed    Advanced Directive: No  Implantable Cardiac Device? No

## 2023-02-10 NOTE — LETTER
2/10/2023     RE: Walter Reyes  [address] [facility] [address]       Department of Radiation Oncology  [facility]  [address] [place] [address] [phone]       Radiation Oncology Follow-up Visit  February 10, 2023      Walter Reyes  MRN: [id number]   : 1960     DISEASE TREATED:   Metastatic prostate cancer    RADIATION THERAPY DELIVERED:   1. C7: 3000 cGy in 10 fractions, from 2021 - 2021  2. Sacrum: 2500 cGy in 5 fractions, from 2022 - 2022    SUBJECTIVE:   Walter Reyes is a 62 year old male with a PMH significant for a metastatic prostate cancer status post palliative radiotherapy to disease involving the cervical spine and sacrum as described above. He was recently seen in medical oncology clinic N2 623 where he was noted to have radiculopathy involving the lumbar spine and extending down the left lower extremity. He was referred back to radiation oncology for consideration of palliative treatment to this region.    On exam today, he describes progressively worsening pain within the low back over the past 2 months. He currently describes his pain as a 5-6/10 in severity and remains controlled with as needed acetaminophen. He is able to localize the pain to the left SI joint and reports that it is fairly reproducible on palpation of this area. He has noted some occasional shooting pains down the left lower extremity that have become more progressive over the past several weeks. He denies any lower motor weakness, saddle anesthesia or bowel/bladder incontinence. He has not had any repeat imaging since 2022.    PHYSICAL EXAM:  Weight: 117.9 kg  BP: 134/74  Pulse: 62    General: Healthy-appearing 62-year-old gentleman seated comfortably in a chair in no acute distress  HEENT: NC/AT. EOMI.  Pulmonary: No wheezing, stridor or respiratory distress  Extremities: No pain to palpation along the  cervical, thoracic or lumbar spine. Moderate pain to palpation over the left SI joint.  Skin: Normal color and turgor  Neuro: A/O x3. 5/5 motor strength in bilateral upper/lower extremities. 2+ patellar reflexes. No clonus. Normal gait.    LABS AND IMAGING:  No recent labs or imaging    IMPRESSION:   Mr. Reyes is a 62 year old male with a metastatic prostate adenocarcinoma s/p two prior courses of palliative radiotherapy to the cervical spine and sacrum. He presents today with progressive pain involving the left SI joint.    PLAN:   1. Ordered pelvic MRI  2. Follow-up with Dr. Lizarraga after repeat imaging for consideration of sacral reirradiation if radiographic evidence concerning for progressive disease    Alex Schultz MD/PhD    Department of Radiation Oncology  [facility]      INITIAL PATIENT ASSESSMENT    Diagnosis: Cancer    Prior radiation therapy C7 3000 cGy completed 08/18/21: Sacrum 2500 cGy completed 04/18/22    Prior chemotherapy: None    Prior hormonal therapy:No    Pain Eval:  Denies    Psychosocial  Living arrangements: Lives with wife  Fall Risk: independent   referral needs: Not needed    Advanced Directive: No  Implantable Cardiac Device? No          HPI      Review of Systems   Constitutional: Positive for malaise/fatigue.   HENT: Negative.    Eyes: Negative.    Respiratory: Negative.    Cardiovascular: Negative.    Gastrointestinal: Negative.    Genitourinary: Positive for frequency and urgency.   Musculoskeletal: Negative.         Left leg pain and tingling   Neurological:        Unsteady   Endo/Heme/Allergies: Negative.    Psychiatric/Behavioral: Positive for depression. The patient is nervous/anxious.        Alex Schultz MD

## 2023-02-18 ENCOUNTER — HOSPITAL ENCOUNTER (OUTPATIENT)
Dept: MRI IMAGING | Facility: CLINIC | Age: 63
Discharge: HOME | End: 2023-02-18
Attending: RADIOLOGY | Admitting: RADIOLOGY
Payer: COMMERCIAL

## 2023-02-18 DIAGNOSIS — C79.51 MALIGNANT NEOPLASM OF PROSTATE METASTATIC TO BONE (H): ICD-10-CM

## 2023-02-18 DIAGNOSIS — C61 MALIGNANT NEOPLASM OF PROSTATE METASTATIC TO BONE (H): ICD-10-CM

## 2023-02-18 PROCEDURE — 72197 MRI PELVIS W/O & W/DYE: CPT

## 2023-02-18 PROCEDURE — 72197 MRI PELVIS W/O & W/DYE: CPT | Mod: 26 | Performed by: RADIOLOGY

## 2023-02-18 PROCEDURE — 255N000002 HC RX 255 OP 636: Performed by: RADIOLOGY

## 2023-02-18 PROCEDURE — A9585 GADOBUTROL INJECTION: HCPCS | Performed by: RADIOLOGY

## 2023-02-18 RX ORDER — GADOBUTROL 604.72 MG/ML
12 INJECTION INTRAVENOUS ONCE
Status: COMPLETED | OUTPATIENT
Start: 2023-02-18 | End: 2023-02-18

## 2023-02-18 RX ADMIN — GADOBUTROL 12 ML: 604.72 INJECTION INTRAVENOUS at 13:31

## 2023-02-19 NOTE — PROGRESS NOTES
Department of Radiation Oncology  [facility]  [address] [place] [address]  [phone]       Radiation Oncology Follow-up Visit  2023    Walter Reyes  MRN: [id number]   : 1960     DISEASE TREATED:   Metastatic prostate cancer      RADIATION THERAPY DELIVERED:   1. C7: 3000 cGy in 10 fractions, from 2021 - 2021  2. Sacrum: 2500 cGy in 5 fractions, from 2022 - 2022    INTERVAL SINCE COMPLETION OF RADIOTHERAPY: 10 months ago (completed 2022)    SUBJECTIVE:     Mr. 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 He has osseous diseases and abdominal lymphadenopathy, and was previous treated with radiotherapy targeting C7.     While on dual androgen deprivation therapy, his PSA slowly francisco javier from 0.22 to 0.72. In this interval, his disease in the bony pelvis progressed. He had been having lower back pain radiating down his left leg, as well as dysesthesia consistent with sacral nerve impingement by his disease involving S1. Therefore, he was treated to a course of palliative radiotherapy as outlined above. He tolerated it well, and found his pain improving towards the end of his course.    He was seen in medical oncology clinic 23 where he was noted to have radiculopathy involving the lumbar spine and extending down the left lower extremity. He was referred back to radiation oncology 2/10/23 for consideration of palliative treatment to this region. Dr. Schultz noted reproducible pain at the Left SI joint. There was no lower extremity weakness, incontinence or saddle anesthesia.     MR Pelvis 23 showed stable lesions of S1, Right iliac, Left femoral neck, Left sacrum. Increased muscle in the paraspinal muscles, gluteal muscles, and bilateral iliacus. Sciatic nerves were unremarkable.     Today on interview, the patient states that he continues to experience shooting  back and leg pain, left greater than right. He denies saddle anesthesia, difficulty walking, or weakness. Pain is localized to lateral lumbar spine, left sided. He equates this pain and sensation to what he felt 12 months ago prior to radiation in April 2022. It is 8-9/10 at baseline but controllable to 4/10 pain with the use of Tylenol and Tramadol. He states he had total relief of back and leg pain within 2 weeks after the completion of radiation to his sacrum.     PHYSICAL EXAM:  General: Healthy-appearing 62-year-old gentleman seated comfortably in a chair in no acute distress  HEENT: NC/AT. EOMI.  Pulmonary: No wheezing, stridor or respiratory distress  Extremities: No pain to palpation along the cervical, thoracic or lumbar spine. Moderate pain to palpation over the left SI joint.  Skin: Normal color and turgor  Neuro: A/O x3. 5/5 motor strength in bilateral upper/lower extremities. Light sensation intact bilaterally in extremities. Normal gait.    LABS AND IMAGING:  MR Pelvic bones (02/18/2023)    Osseous structures  Osseous structures: Redemonstration multiple T1 hypointense, sclerotic  lesions including L4, L5 vertebral body, sacrum, bilateral innominate  bones and left proximal femur are consistent with metastatic foci.  Specifically, some of these lesions demonstrate associated T2  hyperintensity particularly sacral lesions, right posterior iliac  lesion, and L5 vertebral body lesion with associated enhancement, most  compatible with active lesions.      Overall these lesions are stable in sizes using similar measurement  technique:  *  S1 lesion measures 6.6 x 3.6 x 3.8 cm, previously 6.7 x 3.8 x 3.9  cm. Again noted the lesion involving ventral part of sacral foramen  with possible abutment to the S1 nerve (image 14 series 7)  *  Right iliac lesion measures 2.4 x 2.6 x 2.3 cm, previously 2.3 x  2.6 x 2.6 cm.  *  Left femoral neck lesion measures 2.5 cm, previously 2.5 cm.  *  Left sacral lesion 3.3 cm,  previously 3.3 cm.      Internal derangement of joints are not well assessed owing to chosen  field of view.     Joint and Periarticular soft tissue:     Sacroiliac joints and pubic symphysis are congruent.     Joint effusion: A physiologic amount of joint fluid in bilateral hip.     Bursal effusion: Small right greater trochanteric bursal fluid,  increased since prior. No substantial iliopsoas or left trochanteric  bursal effusion.     Muscles and tendons  Muscles and tendons: Redemonstration multiple muscle edema including  paraspinal muscles right greater than left gluteal muscles especially  gluteus medius, bilateral iliac is muscles with associated  enhancement. Overall these findings appears to be increased since  prior.     Proximal hamstrings, rectus femoris, sartorius, and iliopsoas tendons  intact bilaterally. The hip abductors are intact bilaterally. The  visualized adductor muscles are unremarkable bilaterally.      Nerves:  The visualized course of the sciatic nerves are unremarkable  bilaterally.  Impression:  1. Overall stable multiple osseous metastasis including enhancing  lesion of left sacrum adjacent to SI joint.  2. Increased multiple muscle edema and enhancement, of uncertain  etiology.  3. Grossly similar appearance of prostate. Of note this is not  tailored for prostate assessment.    IMPRESSION:   Metastatic prostate adenocarcinoma s/p two prior courses of palliative radiotherapy to the cervical spine and sacrum.     He presents today to review MR pelvis conducted 2/18/23 with grossly stable disease in the setting of worsening back pain/radiculopathy, likely insidious disease progression.     PLAN: We recommend a consideration of repeat radiotherapy to a reduced dose in hopes of palliating his symptoms.  However, given his previous radiotherapy history, the second course of radiotherapy may be limited in efficacy but may have potential for significant increase in toxicity.    Patient wishes  to proceed with a single additional radiotherapy in hopes of duplicating the palliative affect of the previous radiotherapy.  We discussed the potential for benefit as well as potential for significant toxicity because of the repeat treatment of the identical area.       Osmin Richard MD  PGY-2 Radiation Oncology  Department of Radiation Oncology  [facility]  Phone: [phone]      I saw the patient with the resident.  I agree with the resident's note and plan of care.  30Min total    DELORIS. Brody Lizarraga M.D.  Department of Radiation Oncology  [facility]

## 2023-02-21 ENCOUNTER — OFFICE VISIT (OUTPATIENT)
Dept: RADIATION ONCOLOGY | Facility: CLINIC | Age: 63
End: 2023-02-21
Attending: RADIOLOGY
Payer: COMMERCIAL

## 2023-02-21 DIAGNOSIS — C79.51 MALIGNANT NEOPLASM OF PROSTATE METASTATIC TO BONE (H): Primary | ICD-10-CM

## 2023-02-21 DIAGNOSIS — C61 MALIGNANT NEOPLASM OF PROSTATE METASTATIC TO BONE (H): Primary | ICD-10-CM

## 2023-02-23 ENCOUNTER — OFFICE VISIT (OUTPATIENT)
Dept: RADIATION ONCOLOGY | Facility: CLINIC | Age: 63
End: 2023-02-23
Attending: RADIOLOGY
Payer: COMMERCIAL

## 2023-02-23 DIAGNOSIS — C79.51 MALIGNANT NEOPLASM OF PROSTATE METASTATIC TO BONE (H): Primary | ICD-10-CM

## 2023-02-23 DIAGNOSIS — C61 MALIGNANT NEOPLASM OF PROSTATE METASTATIC TO BONE (H): Primary | ICD-10-CM

## 2023-02-23 PROCEDURE — 77334 RADIATION TREATMENT AID(S): CPT | Performed by: RADIOLOGY

## 2023-02-23 PROCEDURE — 77334 RADIATION TREATMENT AID(S): CPT | Mod: 26 | Performed by: RADIOLOGY

## 2023-02-23 PROCEDURE — 77290 THER RAD SIMULAJ FIELD CPLX: CPT | Performed by: RADIOLOGY

## 2023-02-23 PROCEDURE — 77290 THER RAD SIMULAJ FIELD CPLX: CPT | Mod: 26 | Performed by: RADIOLOGY

## 2023-02-23 NOTE — LETTER
2/23/2023         RE: Walter Reyes  [address] [facility] [address]        Dear Colleague,    Thank you for referring your patient, Walter Reyes, to the [facility] RADIATION ONCOLOGY. Please see a copy of my visit note below.    Simulation Note    Date: 2/23/2023     Patient: Walter Reyes    Diagnosis: Malignant neoplasm of prostate metastatic to bone (H)    Type of Simulation:  Palliation     Patient Position: Supine    Patient Immobilization: Custom:  Vac-Loc    Simulation Aid(s): None    Image Acquisition: Conventional CT simulation without 4D    Total Dose Planned: 700 cGy      Dose/fraction:700 cGy    Energy of machine:  18 MV           Type of Radiotherapy Technique: 3D multiple fields with custom MLC blocking      Continuing Physcis/Dosimetry  and Dose calculations are ordered.  Simple simulations will be done prior to new start and changes in fields.  Weekly on treat visit.      YVAN Lizarraga M.D.  Department of Radiation Oncology  [facility]

## 2023-02-23 NOTE — PROGRESS NOTES
Simulation Note    Date: 2/23/2023     Patient: Walter Reyes    Diagnosis: Malignant neoplasm of prostate metastatic to bone (H)    Type of Simulation:  Palliation     Patient Position: Supine    Patient Immobilization: Custom:  Vac-Loc    Simulation Aid(s): None    Image Acquisition: Conventional CT simulation without 4D    Total Dose Planned: 700 cGy      Dose/fraction:700 cGy    Energy of machine:  18 MV           Type of Radiotherapy Technique: 3D multiple fields with custom MLC blocking      Continuing Physcis/Dosimetry  and Dose calculations are ordered.  Simple simulations will be done prior to new start and changes in fields.  Weekly on treat visit.      YVAN Lizarraga M.D.  Department of Radiation Oncology  [facility]

## 2023-02-28 ENCOUNTER — APPOINTMENT (OUTPATIENT)
Dept: RADIATION ONCOLOGY | Facility: CLINIC | Age: 63
End: 2023-02-28
Attending: NURSE PRACTITIONER
Payer: COMMERCIAL

## 2023-02-28 PROCEDURE — 77280 THER RAD SIMULAJ FIELD SMPL: CPT | Mod: 26 | Performed by: RADIOLOGY

## 2023-02-28 PROCEDURE — 77412 RADIATION TX DELIVERY LVL 3: CPT | Performed by: RADIOLOGY

## 2023-02-28 PROCEDURE — 77336 RADIATION PHYSICS CONSULT: CPT | Mod: XU | Performed by: RADIOLOGY

## 2023-02-28 PROCEDURE — 77280 THER RAD SIMULAJ FIELD SMPL: CPT | Performed by: RADIOLOGY

## 2023-03-19 ENCOUNTER — ONCOLOGY VISIT (OUTPATIENT)
Dept: RADIATION ONCOLOGY | Facility: CLINIC | Age: 63
End: 2023-03-19
Payer: COMMERCIAL

## 2023-03-19 NOTE — LETTER
3/19/2023      RE: Walter Reyes  [address] [facility] [address]       No notes on file    Brody Lizarraga MD

## 2023-03-19 NOTE — LETTER
3/19/2023         RE: Walter Reyes  [address] [facility] [address]        Dear Colleague,    Thank you for referring your patient, Walter Reyes, to the [facility] RADIATION ONCOLOGY. Please see a copy of my visit note below.    No notes on file    Again, thank you for allowing me to participate in the care of your patient.        Sincerely,        Brody Lizarraga MD

## 2023-03-20 ENCOUNTER — ONCOLOGY VISIT (OUTPATIENT)
Dept: ONCOLOGY | Facility: CLINIC | Age: 63
End: 2023-03-20
Attending: NURSE PRACTITIONER
Payer: COMMERCIAL

## 2023-03-20 ENCOUNTER — APPOINTMENT (OUTPATIENT)
Dept: LAB | Facility: CLINIC | Age: 63
End: 2023-03-20
Attending: NURSE PRACTITIONER
Payer: COMMERCIAL

## 2023-03-20 VITALS
DIASTOLIC BLOOD PRESSURE: 77 MMHG | HEART RATE: 62 BPM | OXYGEN SATURATION: 97 % | RESPIRATION RATE: 16 BRPM | TEMPERATURE: 97.4 F | SYSTOLIC BLOOD PRESSURE: 113 MMHG | WEIGHT: 262 LBS | BODY MASS INDEX: 36.26 KG/M2

## 2023-03-20 DIAGNOSIS — C61 MALIGNANT NEOPLASM OF PROSTATE METASTATIC TO BONE (H): Primary | ICD-10-CM

## 2023-03-20 DIAGNOSIS — C79.51 BONE METASTASIS: ICD-10-CM

## 2023-03-20 DIAGNOSIS — C79.51 MALIGNANT NEOPLASM OF PROSTATE METASTATIC TO BONE (H): Primary | ICD-10-CM

## 2023-03-20 LAB
ALBUMIN SERPL BCG-MCNC: 4.4 G/DL (ref 3.5–5.2)
ALP SERPL-CCNC: 58 U/L (ref 40–129)
ALT SERPL W P-5'-P-CCNC: 19 U/L (ref 10–50)
ANION GAP SERPL CALCULATED.3IONS-SCNC: 13 MMOL/L (ref 7–15)
AST SERPL W P-5'-P-CCNC: 24 U/L (ref 10–50)
BASOPHILS # BLD AUTO: 0 10E3/UL (ref 0–0.2)
BASOPHILS NFR BLD AUTO: 0 %
BILIRUB SERPL-MCNC: 0.4 MG/DL
BUN SERPL-MCNC: 14.4 MG/DL (ref 8–23)
CALCIUM SERPL-MCNC: 9.8 MG/DL (ref 8.8–10.2)
CHLORIDE SERPL-SCNC: 104 MMOL/L (ref 98–107)
CREAT SERPL-MCNC: 1.07 MG/DL (ref 0.67–1.17)
DEPRECATED HCO3 PLAS-SCNC: 25 MMOL/L (ref 22–29)
EOSINOPHIL # BLD AUTO: 0.2 10E3/UL (ref 0–0.7)
EOSINOPHIL NFR BLD AUTO: 3 %
ERYTHROCYTE [DISTWIDTH] IN BLOOD BY AUTOMATED COUNT: 14.8 % (ref 10–15)
GFR SERPL CREATININE-BSD FRML MDRD: 79 ML/MIN/1.73M2
GLUCOSE SERPL-MCNC: 110 MG/DL (ref 70–99)
HCT VFR BLD AUTO: 39.5 % (ref 40–53)
HGB BLD-MCNC: 12.9 G/DL (ref 13.3–17.7)
IMM GRANULOCYTES # BLD: 0 10E3/UL
IMM GRANULOCYTES NFR BLD: 0 %
LYMPHOCYTES # BLD AUTO: 1.4 10E3/UL (ref 0.8–5.3)
LYMPHOCYTES NFR BLD AUTO: 29 %
MCH RBC QN AUTO: 28.2 PG (ref 26.5–33)
MCHC RBC AUTO-ENTMCNC: 32.7 G/DL (ref 31.5–36.5)
MCV RBC AUTO: 86 FL (ref 78–100)
MONOCYTES # BLD AUTO: 0.5 10E3/UL (ref 0–1.3)
MONOCYTES NFR BLD AUTO: 10 %
NEUTROPHILS # BLD AUTO: 2.8 10E3/UL (ref 1.6–8.3)
NEUTROPHILS NFR BLD AUTO: 58 %
NRBC # BLD AUTO: 0 10E3/UL
NRBC BLD AUTO-RTO: 0 /100
PLATELET # BLD AUTO: 200 10E3/UL (ref 150–450)
POTASSIUM SERPL-SCNC: 4.7 MMOL/L (ref 3.4–5.3)
PROT SERPL-MCNC: 7.3 G/DL (ref 6.4–8.3)
PSA SERPL DL<=0.01 NG/ML-MCNC: 1.54 NG/ML (ref 0–4.5)
RBC # BLD AUTO: 4.58 10E6/UL (ref 4.4–5.9)
SODIUM SERPL-SCNC: 142 MMOL/L (ref 136–145)
WBC # BLD AUTO: 4.9 10E3/UL (ref 4–11)

## 2023-03-20 PROCEDURE — 84403 ASSAY OF TOTAL TESTOSTERONE: CPT | Performed by: NURSE PRACTITIONER

## 2023-03-20 PROCEDURE — 84153 ASSAY OF PSA TOTAL: CPT | Performed by: NURSE PRACTITIONER

## 2023-03-20 PROCEDURE — 85041 AUTOMATED RBC COUNT: CPT | Performed by: NURSE PRACTITIONER

## 2023-03-20 PROCEDURE — G0463 HOSPITAL OUTPT CLINIC VISIT: HCPCS | Performed by: NURSE PRACTITIONER

## 2023-03-20 PROCEDURE — 99215 OFFICE O/P EST HI 40 MIN: CPT | Performed by: NURSE PRACTITIONER

## 2023-03-20 PROCEDURE — 36415 COLL VENOUS BLD VENIPUNCTURE: CPT | Performed by: NURSE PRACTITIONER

## 2023-03-20 PROCEDURE — 80053 COMPREHEN METABOLIC PANEL: CPT | Performed by: NURSE PRACTITIONER

## 2023-03-20 ASSESSMENT — PAIN SCALES - GENERAL: PAINLEVEL: NO PAIN (0)

## 2023-03-20 NOTE — NURSING NOTE
"Oncology Rooming Note    March 20, 2023 10:11 AM   Walter Reyes is a 63 year old male who presents for:    Chief Complaint   Patient presents with     Blood Draw     Labs drawn via  by RN. VS taken.     Oncology Clinic Visit     Return - Malignant neoplasm of prostate metastatic to bone     Initial Vitals: /77   Pulse 62   Temp 97.4  F (36.3  C) (Oral)   Resp 16   Wt 118.8 kg (262 lb)   SpO2 97%   BMI 36.26 kg/m   Estimated body mass index is 36.26 kg/m  as calculated from the following:    Height as of 1/16/23: 1.811 m (5' 11.28\").    Weight as of this encounter: 118.8 kg (262 lb). Body surface area is 2.44 meters squared.  No Pain (0) Comment: Data Unavailable   No LMP for male patient.  Allergies reviewed: Yes  Medications reviewed: Yes    Medications: Medication refills not needed today.  Pharmacy name entered into EPIC:    [facility] [place] - [place], [place] - [address] [id number] - [place], [place] - [address] [facility] - [place], [place] - [address] [facility] [place], [place] - [phone]    Clinical concerns: No new clinical concerns other than reason for visit today.       Audrey Madrigal"

## 2023-03-20 NOTE — PROGRESS NOTES
"  [facility] Oncology Followup  Oncologist: Dr. Jj Winters  Mar 20, 2023    REASON FOR VISIT: Follow-up for metastatic castration-resistant prostate cancer    HISTORY OF PRESENT ILLNESS: Mr. 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 His oncologic history is detailed below.     1/6/21  PSA = 0.29  3/31/21 PSA = 0.44  7/7/21  PSA = 0.47  11/2021 PSA = 1.05  -- Start XTANDI  12/16/21 PSA =0.22  2/11/22 PSA= 0.50  3/22/22 PSA=0.72  5/5/2022 PSA=1.79  7/11/2022 PSA=2.16 --Start cycle 1 docetaxel   8/22/22 PSA = 1.36  9/12/22 PSA = 1.09  10/24/22 Cycle #6 of Docetaxel. End of treatment  1/9/23  PSA =0.66  3/20/23  PSA=1.54    Interval History:.     ONCOLOGIC HISTORY:  1. 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 Naylor at [facility]. Prior PSA were 1.4 on 8/16/17, 2.4 on 6/28/16, 2.9 on 6/28/16, and as low as 0.4 on 3/26/2003.   - 1/08/2019: Consultation with Sarah Wilson CNP in Urology clinic. Repeat PSA 9.6.  - 1/16/2019: MRI prostate with contrast - \"This examination is characterized as PIRADS 5- very high probability. Clinically significant cancer is highly likely to be present. There is a large, invasive mass arising from the right peripheral zone and extending into the neurovascular bundle, seminal vesicle, and along the anterolateral right mesorectal fascia. Metastatic right external iliac lymph node. Metastatic lesion in the posterior/superior right acetabulum.\"  - 1/25/2019: CT abdomen and pelvis with IV contrast - \"1. Heterogeneous enhancing mass posteriorly in the upper pole of the right kidney measures 4.5 x 5.8 x 5.7 cm (AP by transverse by craniocaudal). It has a small " "nodular component extending posterior medially which abuts the right psoas muscle. This nodular extension measures 2.1 x 2.1 cm. Minimal stranding about the mass. No definite thrombus within the right renal vein. This renal mass is compatible with a renal cell carcinoma. A paraaortic lymph node situated immediately posterior to the left renal vein measures 1.1 cm in short axis, suspicious for metastasis. 2. A 2 cm right external iliac lymph node is also suspicious for metastases. 3. Multiple sclerotic osseous lesions suspicious for metastases. These include 0.8 and 0.6 cm sclerotic lesions laterally in the right iliac wing (images 53 and 62 respectively). Ill-defined groundglass density laterally in the right acetabulum measuring 1.7 x 1.2 cm corresponds with the lesion identified on MRI. A 0.4 cm groundglass density in the left acetabulum. Sclerotic lesion in the left femoral neck measures 0.9 x 1.6 cm (image 81). Sclerotic metastases would be more compatible with prostate metastases. 4. A 3 subcentimeter hepatic lesions are indeterminate. Metastases would be a consideration. These can be further characterized with liver MRI.\"  - 1/25/2019: NM bone scan - \"There is focal bony uptake in the left femoral neck, right acetabulum, right of midline at the S1 or L5 level of the spine, within multiple bilateral ribs, in the C7 or T1 level of the spine, and anteriorly within the skull. Findings are suspicious for metastases.\"  - 1/31/19: CT chest with contrast - \" No suspicious nodules in the chest.  Stable appearance of a 5.8 cm right renal mass concerning for renal carcinoma until proven otherwise.  Stable indeterminant subcentimeter hypodensities in the liver.  Multifocal osteoblastic metastasis including 2.5 cm lesion in the right fifth rib posteriorly and a 1.6 cm lesion in the right third rib posteriorly. No lytic lesions identified.\"  - 2/6/19: CT-guided right sclerotic fifth rib lesion biopsy - \"Metastatic " "carcinoma, consistent with prostate primary.  Immunohistochemical stains performed show the metastatic carcinoma stains positive for NKX3.1 (prostate marker), negative for STACIE 3 and PAX8, which supports the above diagnosis.\"  - 2/15/19: Started Casodex 50mg every day and consented for the biobanking protocol. 3/18/19 - stopped Casodex.  - 2/19/19: Case discussed in tumor board - recommendation for nephectomy for suspected malignant right renal mass.   - 2/26/19: Started Lupron 22.5mg every 3 months.   - 5/8/19: Started Docetaxel 75mg/m2 IV every 3 weeks. 06/18/19 - cycle 3, 7/10/19 - cycle 4, 07/31/19 - cycle 5, 08/21/19 - cycle 6.   - 10/2/19: Restaging CT CAP and NM Bone Scan showed improved osseous disease, no evidence of recurrent or new metastatic disease.  - 1/5/20: Restaging CT CAP and NM Bone Scan showed stable osseous disease, no evidence of recurrent or new metastatic disease.  - 4/6/20: NM bone scan with slightly improved uptake in known skeletal mets. CT C/A/P with contrast with stable osseous mets, no yusuf enlargement, no new visceral mets etc.  - 04/08/20: Zometa every 3 months.  - 10/5/20: CT C/A/P with contrast and NM bone scan - SD.   - 1/4/21: CT C/A/P with contrast and NM bone scan - SD but slight increase in right 5th rib tracer uptake and in posterior L5 sclerosis.   - 03/29/21: CT C/A/P with contrast - single new right sacral 8mm bone lesion (suspicious), other bone mets stable. No visceral/yusuf disease. Nephrectomy site without recurrence. NM bone scan - SD but \"increased uptake associated with the prior lesions of the lower  cervical spine, posterior right fourth rib and right L5 posterior arch elements. Otherwise unchanged uptake associated with the proximal left femur and left anterior fourth rib. Similar uptake of the left halux, possibly secondary to degenerative osteoarthritis or gout.\"      2. Stage III ([id number]), grade 3 of 4, clear-cell RCC of right kidney:  - Incidentally " "diagnosed as above.   - Underwent curative-intent robotic right radical nephrectomy with Dr. Wesley [place] on 3/19/19. No tumor spillage per op report.   - Path showed: \"Histologic Type: Clear cell renal cell carcinoma; Sarcomatoid Features: Not identified; Histologic Grade: Nucleolar grade 3 (WHO/ISUP). Extent Tumor Size: 4.3 cm. Microscopic Tumor Extension: Tumor extension into renal sinus (in vascular structures). Margins: Negative. Tumor Necrosis: Present; focal. Lymph-Vascular Invasion: Not identified.  Pathologic Staging (pTNM) Primary Tumor (pT): pT3a: Tumor extends into renal vein branches/renal sinus. Regional Lymph Nodes (pN): pNX. Number of Lymph Nodes Examined: 0 Distant Metastasis (pM): pM N/A.\"  - Restaging scans as above.    INTERVAL HISTORY:   Walter is here for follow up. His left leg pain/radiculopathy completed resolved with one fraction of radiation. No new complaints.     PAST MEDICAL HISTORY:  Past Medical History:   Diagnosis Date     Cancer of kidney, right (H)      Complication of anesthesia     slow wakeup      Malignant neoplasm of prostate metastatic to bone (H) 2/14/2019     Thyroid disease      PAST SURGICAL HISTORY:   Past Surgical History:   Procedure Laterality Date     CYSTOSCOPY      about 10 years ago     INSERT PORT VASCULAR ACCESS Right 5/2/2019    Procedure: Chest Port Placement;  Surgeon: Tate Burciaga PA-C;  Location: [facility] OR     IR CHEST PORT PLACEMENT > 5 YRS OF AGE  5/2/2019     LAPAROSCOPIC NEPHRECTOMY Right 3/19/2019    Procedure: Right laparoscopic radical nephrectomy;  Surgeon: Wesley [facility] MD;  Location:  OR      SOCIAL HISTORY:   Social History     Tobacco Use     Smoking status: Never     Smokeless tobacco: Never   Substance Use Topics     Drug use: No     FAMILY HISTORY:   Family History   Problem Relation Age of Onset     Diabetes Father      ALLERGIES:   Allergies   Allergen Reactions     Amlodipine Other (See Comments)     Leg swelling "     CURRENT MEDICATIONS:   Current Outpatient Medications:      atorvastatin (LIPITOR) 20 MG tablet, Take 60 mg by mouth daily , Disp: , Rfl:      Barberry-Oreg Grape-Goldenseal (BERBERINE COMPLEX PO), , Disp: , Rfl:      calcium citrate-vitamin D (CITRACAL) 315-250 MG-UNIT TABS per tablet, Take 650 mg by mouth 2 times daily , Disp: , Rfl:      cycloSPORINE (RESTASIS) 0.05 % ophthalmic emulsion, Place 1 drop into both eyes 2 times daily , Disp: , Rfl:      fluconazole (DIFLUCAN) 200 MG tablet, Take 1 tablet (200 mg) by mouth daily, Disp: 60 tablet, Rfl: 1     Glucosamine-Chondroit-Vit C-Mn (GLUCOSAMINE 1500 COMPLEX) CAPS, Take 1 capsule by mouth daily, Disp: , Rfl:      levothyroxine (SYNTHROID/LEVOTHROID) 112 MCG tablet, Take 112 mcg by mouth daily, Disp: , Rfl:      loratadine (CLARITIN) 10 MG tablet, , Disp: , Rfl:      MAGNESIUM PO, Take 250 mg by mouth 2 times daily , Disp: , Rfl:      MEBENDAZOLE PO, Take 225 mg by mouth daily , Disp: , Rfl:      metFORMIN (GLUCOPHAGE XR) 500 MG 24 hr tablet, Take 1 tablet with breakfast, 2 tablets with dinner., Disp: , Rfl:      Multiple Vitamins-Minerals (MULTIVITAMIN ADULT EXTRA C PO), Take 1 tablet by mouth every 24 hours, Disp: , Rfl:      Nutritional Supplements (SALMON OIL) CAPS, Take 2 capsules by mouth daily , Disp: , Rfl:      omeprazole (PRILOSEC) 20 MG DR capsule, Take 20 mg by mouth daily, Disp: , Rfl:      tamsulosin (FLOMAX) 0.4 MG capsule, Take 1 capsule (0.4 mg) by mouth daily, Disp: 30 capsule, Rfl: 3    Physical Exam:   /77   Pulse 62   Temp 97.4  F (36.3  C) (Oral)   Resp 16   Wt 118.8 kg (262 lb)   SpO2 97%   BMI 36.26 kg/m     General: well appearing, no acute distress, pleasant male   MSK: full range of motion in all four extremities, no peripheral edema  Neuro: alert and oriented x3, CN grossly intact       ECOG PS 1.    LABORATORY DATA:  Most Recent 3 CBC's:  Recent Labs   Lab Test 02/06/23  0902 01/09/23  0736 11/21/22  0846   WBC 4.1 5.2  6.1   HGB 12.4* 12.6* 12.6*   MCV 86 94 96    226 216   ANEUTAUTO 2.2 2.9 4.2     Most Recent 3 BMP's:  Recent Labs   Lab Test 02/06/23  0902 01/09/23  0736 11/21/22  0846    143 142   POTASSIUM 4.2 4.3 4.4   CHLORIDE 104 106 107   CO2 24 25 25   BUN 16.3 21.7 11.8   CR 1.08 1.05 0.93   ANIONGAP 13 12 10   BETHANY 9.2 9.1 9.2   * 112* 133*   PROTTOTAL 6.7 6.7 6.5   ALBUMIN 4.2 4.2 4.1    Most Recent 3 LFT's:  Recent Labs   Lab Test 02/06/23  0902 01/09/23  0736 11/21/22  0846   AST 36 22 20   ALT 20 18 22   ALKPHOS 49 55 48   BILITOTAL 0.2 0.2 0.3    Most Recent 2 TSH and T4:  Recent Labs   Lab Test 07/07/21  0834 03/31/21  0824   TSH 1.67 1.63                           I reviewed the above labs today.     Latest Reference Range & Units 01/09/23 07:36 02/06/23 09:02 03/20/23 09:52   Glucose 70 - 99 mg/dL 112 (H) 104 (H) 110 (H)   PSA Tumor Marker 0.00 - 4.50 ng/mL 0.66 0.86 1.54   Testosterone Total 240 - 950 ng/dL 6 (L) 6 (L)    (H): Data is abnormally high  (L): Data is abnormally low    12/6/22 repeat MRI  IMPRESSION:   1. Decreased enhancement within the C7 metastasis with decreased  epidural enhancement, consistent with response to therapy.  2. Radiation changes within the adjacent vertebrae.  3. No new metastases.  4. Mild degenerative change.  5. Small mass within the left deep lobe of the left parotid gland with  fluid-fluid level which may represent primary parotid tumor.  6. Slight fullness within the left vallecula which presumably  represents submucosal cyst. Direct visualization could be considered.      ASSESSMENT & PLAN: Mr. 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     1. Metastatic castration-resistant prostate cancer, with involvement of the bones and RPLN:   - Patient met the criteria for CHAARTED \"high-volume\" metastatic hormone-sensitive prostate cancer. He opted for docetaxel in combination with ADT (per JOSEFA, [id number], KARLA).     Walter has now been off chemotherapy for about 8 weeks.  Last PSA was a decline compared to the previous.  PSMA PET scan Nov 2022 reveals persistent yusuf and bony disease that is evident as well as evidence of a lesion in C7. MRI C spine showed stability. Difficult to interpret PSMA avid scans in the context of a declining PSA in the case of a radiated site but did get additional radiation to sacrum with relief of symptoms.   --PSA up trending, will confirm if beneficial to get PSMA PET now given it is approaching 2.00 or if we wait.   --RTC with Dr. Winters next month as scheduled.   --Consider moving forward with lutetium 177 as next line of treatment    2. Bone metastases:   - Noted to have osseous metastatic disease at the time of diagnosis. S/p radiation to C spine and sacrum (Re-irradiation to sacrum).   - Encouraged to continue calcium and vitamin D supplementation.  - Will continue Zometa 4mg IV every ~3 months, will request for May  - pain resolved with radiation for now, otherwise tramadol and tylenol has been helpful in the past     3. Stage 3, UISS-high risk ccRCC: s/p R nephrectomy   - approximately 40-50% risk of recurrence after definitive surgery.   - no clear evidence of residual disease at this time; the retroperitoneal lymphadenopathy is more consistent with metastatic prostate cancer   - Continue active surveillance with self-reporting for signs/symptoms of recurrence (this was previously explained in detail) and periodic H&P and scans.    50 minutes spent on the date of the encounter doing chart review, review of test results, interpretation of tests, patient visit, documentation and discussion with other " provider(s)     Veda Greenwood CNP on 3/20/2023 at 11:32 AM

## 2023-03-22 LAB — TESTOST SERPL-MCNC: 6 NG/DL (ref 240–950)

## 2023-03-24 NOTE — PROCEDURES
Radiotherapy Treatment Summary          Date of Report: 2023     PATIENT: AZRA REYES  MEDICAL RECORD NO: [id number]  : 1960     DIAGNOSIS: C79.51 Secondary malignant neoplasm of bone  INTENT OF RADIOTHERAPY: Cure  PATHOLOGY:    Bone metastases, Castrate sensitive metastatic prostate cancer.                               STAGE: stage IV (M1b at diagnosis)     Details of the treatments summarized below are found in records kept in the Department of Radiation Oncology at [facility].     Treatment Summary:      Treatment Site Current Dose Modality From To Elapsed Days Fx.  Sacrum retreat    700 cGy 18 X  2/28/2023  2023   1                Dose per Fraction: Sacrum: 700 cGy  Total Dose:      Sacrum: 700 cGy        COMMENTS:                      Mr. 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 He has osseous   diseases and abdominal lymphadenopathy, and was previous treated with radiotherapy targeting C7 (30 Gy in 10   fractions, from 2021 - 2021) and Sacrum (25 Gy in 5 fractions, from 2022 - 2022).      He was seen in medical oncology clinic 23 where he was noted to have radiculopathy involving the lumbar spine and   extending down the left lower extremity. He was referred back to radiation oncology 2/10/23 for consideration of   palliative treatment to this region. Dr. Schultz noted reproducible pain at the Left SI joint.      MR Pelvis 23 showed stable lesions of S1, Right iliac, Left femoral neck, Left sacrum. Increased muscle in the   paraspinal muscles, gluteal muscles, and bilateral iliacus. Sciatic nerves were unremarkable.      He proceeded with radiation therapy under the direction of Dr. Brody Lizarraga with palliative intent to the sacrum with a   single fraction of 700 cGy with 3D conformal technique with 3 orthogonal fields using 18MV photons. He tolerated   treatment without acute toxicity.       ED visits/hospitalizations: none      Missed treatments: none     Acute Toxicity Profile by CTC v5.0:  None      PAIN MANAGEMENT:                           Not required.      FOLLOW UP PLAN:                         Follow-up as needed with radiation oncology.      Resident Physician:    Osmin Richard M.D.   Staff Physician: YVAN Lizarraga M.D.     CC: Jj Winters MD                                     Radiation Oncology:  [facility] [address], [address] [place] [phone]

## 2023-04-21 ENCOUNTER — LAB (OUTPATIENT)
Dept: INFUSION THERAPY | Facility: CLINIC | Age: 63
End: 2023-04-21
Attending: INTERNAL MEDICINE
Payer: COMMERCIAL

## 2023-04-21 DIAGNOSIS — C61 MALIGNANT NEOPLASM OF PROSTATE METASTATIC TO BONE (H): Primary | ICD-10-CM

## 2023-04-21 DIAGNOSIS — C64.1 RENAL CELL CARCINOMA, RIGHT (H): ICD-10-CM

## 2023-04-21 DIAGNOSIS — C79.51 MALIGNANT NEOPLASM OF PROSTATE METASTATIC TO BONE (H): Primary | ICD-10-CM

## 2023-04-21 LAB
ALBUMIN SERPL BCG-MCNC: 4.1 G/DL (ref 3.5–5.2)
ALP SERPL-CCNC: 67 U/L (ref 40–129)
ALT SERPL W P-5'-P-CCNC: 19 U/L (ref 10–50)
ANION GAP SERPL CALCULATED.3IONS-SCNC: 11 MMOL/L (ref 7–15)
AST SERPL W P-5'-P-CCNC: 19 U/L (ref 10–50)
BASOPHILS # BLD AUTO: 0 10E3/UL (ref 0–0.2)
BASOPHILS NFR BLD AUTO: 0 %
BILIRUB SERPL-MCNC: 0.2 MG/DL
BUN SERPL-MCNC: 16.9 MG/DL (ref 8–23)
CALCIUM SERPL-MCNC: 9.2 MG/DL (ref 8.8–10.2)
CHLORIDE SERPL-SCNC: 105 MMOL/L (ref 98–107)
CREAT SERPL-MCNC: 1.04 MG/DL (ref 0.67–1.17)
DEPRECATED HCO3 PLAS-SCNC: 24 MMOL/L (ref 22–29)
EOSINOPHIL # BLD AUTO: 0.2 10E3/UL (ref 0–0.7)
EOSINOPHIL NFR BLD AUTO: 3 %
ERYTHROCYTE [DISTWIDTH] IN BLOOD BY AUTOMATED COUNT: 15.9 % (ref 10–15)
GFR SERPL CREATININE-BSD FRML MDRD: 82 ML/MIN/1.73M2
GLUCOSE SERPL-MCNC: 104 MG/DL (ref 70–99)
HCT VFR BLD AUTO: 37.8 % (ref 40–53)
HGB BLD-MCNC: 12 G/DL (ref 13.3–17.7)
IMM GRANULOCYTES # BLD: 0 10E3/UL
IMM GRANULOCYTES NFR BLD: 0 %
LYMPHOCYTES # BLD AUTO: 1.5 10E3/UL (ref 0.8–5.3)
LYMPHOCYTES NFR BLD AUTO: 28 %
MCH RBC QN AUTO: 28.4 PG (ref 26.5–33)
MCHC RBC AUTO-ENTMCNC: 31.7 G/DL (ref 31.5–36.5)
MCV RBC AUTO: 89 FL (ref 78–100)
MONOCYTES # BLD AUTO: 0.5 10E3/UL (ref 0–1.3)
MONOCYTES NFR BLD AUTO: 9 %
NEUTROPHILS # BLD AUTO: 3.1 10E3/UL (ref 1.6–8.3)
NEUTROPHILS NFR BLD AUTO: 60 %
NRBC # BLD AUTO: 0 10E3/UL
NRBC BLD AUTO-RTO: 0 /100
PLATELET # BLD AUTO: 199 10E3/UL (ref 150–450)
POTASSIUM SERPL-SCNC: 4 MMOL/L (ref 3.4–5.3)
PROT SERPL-MCNC: 6.7 G/DL (ref 6.4–8.3)
PSA SERPL DL<=0.01 NG/ML-MCNC: 1.81 NG/ML (ref 0–4.5)
RBC # BLD AUTO: 4.23 10E6/UL (ref 4.4–5.9)
SODIUM SERPL-SCNC: 140 MMOL/L (ref 136–145)
WBC # BLD AUTO: 5.3 10E3/UL (ref 4–11)

## 2023-04-21 PROCEDURE — 250N000011 HC RX IP 250 OP 636: Performed by: INTERNAL MEDICINE

## 2023-04-21 PROCEDURE — 80053 COMPREHEN METABOLIC PANEL: CPT | Performed by: INTERNAL MEDICINE

## 2023-04-21 PROCEDURE — 84153 ASSAY OF PSA TOTAL: CPT | Performed by: INTERNAL MEDICINE

## 2023-04-21 PROCEDURE — 36591 DRAW BLOOD OFF VENOUS DEVICE: CPT

## 2023-04-21 PROCEDURE — 85025 COMPLETE CBC W/AUTO DIFF WBC: CPT | Performed by: INTERNAL MEDICINE

## 2023-04-21 PROCEDURE — 84403 ASSAY OF TOTAL TESTOSTERONE: CPT | Performed by: INTERNAL MEDICINE

## 2023-04-21 RX ORDER — DIPHENHYDRAMINE HYDROCHLORIDE 50 MG/ML
50 INJECTION INTRAMUSCULAR; INTRAVENOUS
Status: CANCELLED
Start: 2023-04-21

## 2023-04-21 RX ORDER — HEPARIN SODIUM (PORCINE) LOCK FLUSH IV SOLN 100 UNIT/ML 100 UNIT/ML
5 SOLUTION INTRAVENOUS
Status: DISCONTINUED | OUTPATIENT
Start: 2023-04-21 | End: 2023-04-21 | Stop reason: HOSPADM

## 2023-04-21 RX ORDER — MEPERIDINE HYDROCHLORIDE 25 MG/ML
25 INJECTION INTRAMUSCULAR; INTRAVENOUS; SUBCUTANEOUS EVERY 30 MIN PRN
Status: CANCELLED | OUTPATIENT
Start: 2023-04-21

## 2023-04-21 RX ORDER — EPINEPHRINE 1 MG/ML
0.3 INJECTION, SOLUTION, CONCENTRATE INTRAVENOUS EVERY 5 MIN PRN
Status: CANCELLED | OUTPATIENT
Start: 2023-04-21

## 2023-04-21 RX ORDER — HEPARIN SODIUM (PORCINE) LOCK FLUSH IV SOLN 100 UNIT/ML 100 UNIT/ML
5 SOLUTION INTRAVENOUS
Status: CANCELLED | OUTPATIENT
Start: 2023-04-21

## 2023-04-21 RX ORDER — HEPARIN SODIUM,PORCINE 10 UNIT/ML
5 VIAL (ML) INTRAVENOUS
Status: CANCELLED | OUTPATIENT
Start: 2023-04-21

## 2023-04-21 RX ORDER — ALBUTEROL SULFATE 0.83 MG/ML
2.5 SOLUTION RESPIRATORY (INHALATION)
Status: CANCELLED | OUTPATIENT
Start: 2023-04-21

## 2023-04-21 RX ORDER — ALBUTEROL SULFATE 90 UG/1
1-2 AEROSOL, METERED RESPIRATORY (INHALATION)
Status: CANCELLED
Start: 2023-04-21

## 2023-04-21 RX ORDER — NALOXONE HYDROCHLORIDE 0.4 MG/ML
0.2 INJECTION, SOLUTION INTRAMUSCULAR; INTRAVENOUS; SUBCUTANEOUS
Status: CANCELLED | OUTPATIENT
Start: 2023-04-21

## 2023-04-21 RX ORDER — LORAZEPAM 2 MG/ML
0.5 INJECTION INTRAMUSCULAR EVERY 4 HOURS PRN
Status: CANCELLED | OUTPATIENT
Start: 2023-04-21

## 2023-04-21 RX ADMIN — Medication 5 ML: at 08:16

## 2023-04-21 NOTE — PROGRESS NOTES
Nursing Note:  Walter Reyes presents today for port labs.    Patient seen by provider today: No   present during visit today: Not Applicable.    Note: N/A.    Intravenous Access:  Implanted Port, difficulty getting blood return at first.  After multiple NS flushes and position changes obtained good blood return.    Discharge Plan:   Next appointment: 4/24/23 with Dr. Winters.    Sera Santos RN

## 2023-04-24 ENCOUNTER — VIRTUAL VISIT (OUTPATIENT)
Dept: ONCOLOGY | Facility: CLINIC | Age: 63
End: 2023-04-24
Attending: INTERNAL MEDICINE
Payer: COMMERCIAL

## 2023-04-24 VITALS
SYSTOLIC BLOOD PRESSURE: 116 MMHG | DIASTOLIC BLOOD PRESSURE: 92 MMHG | HEIGHT: 72 IN | WEIGHT: 262 LBS | BODY MASS INDEX: 35.49 KG/M2

## 2023-04-24 DIAGNOSIS — C61 PROSTATE CANCER (H): Primary | ICD-10-CM

## 2023-04-24 DIAGNOSIS — E66.01 MORBID OBESITY (H): ICD-10-CM

## 2023-04-24 PROCEDURE — G0463 HOSPITAL OUTPT CLINIC VISIT: HCPCS | Mod: PN,GT | Performed by: INTERNAL MEDICINE

## 2023-04-24 PROCEDURE — 99214 OFFICE O/P EST MOD 30 MIN: CPT | Mod: VID | Performed by: INTERNAL MEDICINE

## 2023-04-24 ASSESSMENT — PAIN SCALES - GENERAL: PAINLEVEL: NO PAIN (0)

## 2023-04-24 NOTE — PROGRESS NOTES
"Virtual Visit Details    Type of service:  Video Visit     Originating Location (pt. Location): Home    Distant Location (provider location):  On-site  Platform used for Video Visit: [facility] Oncology Followup  Oncologist: Dr. Jj Winters  Apr 24, 2023    REASON FOR VISIT: Follow-up for metastatic castration-resistant prostate cancer    HISTORY OF PRESENT ILLNESS: Mr. 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 His oncologic history is detailed below.     1/6/21  PSA = 0.29  3/31/21 PSA = 0.44  7/7/21  PSA = 0.47  11/2021 PSA = 1.05  -- Start XTANDI  12/16/21 PSA =0.22  2/11/22 PSA= 0.50  3/22/22 PSA=0.72  5/5/2022 PSA=1.79  7/11/2022 PSA=2.16 --Start cycle 1 docetaxel   8/22/22 PSA = 1.36  9/12/22 PSA = 1.09  10/24/22 Cycle #6 of Docetaxel. End of treatment  1/9/23  PSA =0.66  3/20/23  PSA=1.54  4/21/23 PSA = 1.81  T=15  Interval History:.     ONCOLOGIC HISTORY:  1. 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 Naylor at [facility]. Prior PSA were 1.4 on 8/16/17, 2.4 on 6/28/16, 2.9 on 6/28/16, and as low as 0.4 on 3/26/2003.   - 1/08/2019: Consultation with Sarah Wilson CNP in Urology clinic. Repeat PSA 9.6.  - 1/16/2019: MRI prostate with contrast - \"This examination is characterized as PIRADS 5- very high probability. Clinically significant cancer is highly likely to be present. There is a large, invasive mass arising from the right peripheral zone and extending into the neurovascular bundle, seminal vesicle, and along the anterolateral right mesorectal fascia. Metastatic right external iliac lymph node. Metastatic lesion in the posterior/superior right acetabulum.\"  - " "1/25/2019: CT abdomen and pelvis with IV contrast - \"1. Heterogeneous enhancing mass posteriorly in the upper pole of the right kidney measures 4.5 x 5.8 x 5.7 cm (AP by transverse by craniocaudal). It has a small nodular component extending posterior medially which abuts the right psoas muscle. This nodular extension measures 2.1 x 2.1 cm. Minimal stranding about the mass. No definite thrombus within the right renal vein. This renal mass is compatible with a renal cell carcinoma. A paraaortic lymph node situated immediately posterior to the left renal vein measures 1.1 cm in short axis, suspicious for metastasis. 2. A 2 cm right external iliac lymph node is also suspicious for metastases. 3. Multiple sclerotic osseous lesions suspicious for metastases. These include 0.8 and 0.6 cm sclerotic lesions laterally in the right iliac wing (images 53 and 62 respectively). Ill-defined groundglass density laterally in the right acetabulum measuring 1.7 x 1.2 cm corresponds with the lesion identified on MRI. A 0.4 cm groundglass density in the left acetabulum. Sclerotic lesion in the left femoral neck measures 0.9 x 1.6 cm (image 81). Sclerotic metastases would be more compatible with prostate metastases. 4. A 3 subcentimeter hepatic lesions are indeterminate. Metastases would be a consideration. These can be further characterized with liver MRI.\"  - 1/25/2019: NM bone scan - \"There is focal bony uptake in the left femoral neck, right acetabulum, right of midline at the S1 or L5 level of the spine, within multiple bilateral ribs, in the C7 or T1 level of the spine, and anteriorly within the skull. Findings are suspicious for metastases.\"  - 1/31/19: CT chest with contrast - \" No suspicious nodules in the chest.  Stable appearance of a 5.8 cm right renal mass concerning for renal carcinoma until proven otherwise.  Stable indeterminant subcentimeter hypodensities in the liver.  Multifocal osteoblastic metastasis including 2.5 " "cm lesion in the right fifth rib posteriorly and a 1.6 cm lesion in the right third rib posteriorly. No lytic lesions identified.\"  - 2/6/19: CT-guided right sclerotic fifth rib lesion biopsy - \"Metastatic carcinoma, consistent with prostate primary.  Immunohistochemical stains performed show the metastatic carcinoma stains positive for NKX3.1 (prostate marker), negative for STACIE 3 and PAX8, which supports the above diagnosis.\"  - 2/15/19: Started Casodex 50mg every day and consented for the biobanking protocol. 3/18/19 - stopped Casodex.  - 2/19/19: Case discussed in tumor board - recommendation for nephectomy for suspected malignant right renal mass.   - 2/26/19: Started Lupron 22.5mg every 3 months.   - 5/8/19: Started Docetaxel 75mg/m2 IV every 3 weeks. 06/18/19 - cycle 3, 7/10/19 - cycle 4, 07/31/19 - cycle 5, 08/21/19 - cycle 6.   - 10/2/19: Restaging CT CAP and NM Bone Scan showed improved osseous disease, no evidence of recurrent or new metastatic disease.  - 1/5/20: Restaging CT CAP and NM Bone Scan showed stable osseous disease, no evidence of recurrent or new metastatic disease.  - 4/6/20: NM bone scan with slightly improved uptake in known skeletal mets. CT C/A/P with contrast with stable osseous mets, no yusuf enlargement, no new visceral mets etc.  - 04/08/20: Zometa every 3 months.  - 10/5/20: CT C/A/P with contrast and NM bone scan - SD.   - 1/4/21: CT C/A/P with contrast and NM bone scan - SD but slight increase in right 5th rib tracer uptake and in posterior L5 sclerosis.   - 03/29/21: CT C/A/P with contrast - single new right sacral 8mm bone lesion (suspicious), other bone mets stable. No visceral/yusuf disease. Nephrectomy site without recurrence. NM bone scan - SD but \"increased uptake associated with the prior lesions of the lower  cervical spine, posterior right fourth rib and right L5 posterior arch elements. Otherwise unchanged uptake associated with the proximal left femur and left anterior " "fourth rib. Similar uptake of the left halux, possibly secondary to degenerative osteoarthritis or gout.\"      2. Stage III ([id number]), grade 3 of 4, clear-cell RCC of right kidney:  - Incidentally diagnosed as above.   - Underwent curative-intent robotic right radical nephrectomy with Dr. Wesley Cleveland on 3/19/19. No tumor spillage per op report.   - Path showed: \"Histologic Type: Clear cell renal cell carcinoma; Sarcomatoid Features: Not identified; Histologic Grade: Nucleolar grade 3 (WHO/ISUP). Extent Tumor Size: 4.3 cm. Microscopic Tumor Extension: Tumor extension into renal sinus (in vascular structures). Margins: Negative. Tumor Necrosis: Present; focal. Lymph-Vascular Invasion: Not identified.  Pathologic Staging (pTNM) Primary Tumor (pT): pT3a: Tumor extends into renal vein branches/renal sinus. Regional Lymph Nodes (pN): pNX. Number of Lymph Nodes Examined: 0 Distant Metastasis (pM): pM N/A.\"  - Restaging scans as above.    INTERVAL HISTORY:   Walter is here for follow up. His left leg pain/radiculopathy completed resolved with one fraction of radiation. No new complaints.     PAST MEDICAL HISTORY:  Past Medical History:   Diagnosis Date     Cancer of kidney, right (H)      Complication of anesthesia     slow wakeup      Malignant neoplasm of prostate metastatic to bone (H) 2/14/2019     Thyroid disease      PAST SURGICAL HISTORY:   Past Surgical History:   Procedure Laterality Date     CYSTOSCOPY      about 10 years ago     INSERT PORT VASCULAR ACCESS Right 5/2/2019    Procedure: Chest Port Placement;  Surgeon: Tate Burciaga PA-C;  Location: [facility] OR     IR CHEST PORT PLACEMENT > 5 YRS OF AGE  5/2/2019     LAPAROSCOPIC NEPHRECTOMY Right 3/19/2019    Procedure: Right laparoscopic radical nephrectomy;  Surgeon: Wesley Cleveland MD;  Location:  OR      SOCIAL HISTORY:   Social History     Tobacco Use     Smoking status: Never     Smokeless tobacco: Never   Substance Use Topics     Drug " use: No     FAMILY HISTORY:   Family History   Problem Relation Age of Onset     Diabetes Father      ALLERGIES:   Allergies   Allergen Reactions     Amlodipine Other (See Comments)     Leg swelling     CURRENT MEDICATIONS:   Current Outpatient Medications:      atorvastatin (LIPITOR) 20 MG tablet, Take 60 mg by mouth daily , Disp: , Rfl:      Barberry-Oreg Grape-Goldenseal (BERBERINE COMPLEX PO), , Disp: , Rfl:      cycloSPORINE (RESTASIS) 0.05 % ophthalmic emulsion, Place 1 drop into both eyes 2 times daily , Disp: , Rfl:      fluconazole (DIFLUCAN) 200 MG tablet, Take 1 tablet (200 mg) by mouth daily, Disp: 60 tablet, Rfl: 1     Glucosamine-Chondroit-Vit C-Mn (GLUCOSAMINE 1500 COMPLEX) CAPS, Take 1 capsule by mouth daily, Disp: , Rfl:      levothyroxine (SYNTHROID/LEVOTHROID) 112 MCG tablet, Take 112 mcg by mouth daily, Disp: , Rfl:      loratadine (CLARITIN) 10 MG tablet, , Disp: , Rfl:      MEBENDAZOLE PO, Take 225 mg by mouth daily , Disp: , Rfl:      metFORMIN (GLUCOPHAGE XR) 500 MG 24 hr tablet, Take 1 tablet with breakfast, 2 tablets with dinner., Disp: , Rfl:      Multiple Vitamins-Minerals (MULTIVITAMIN ADULT EXTRA C PO), Take 1 tablet by mouth every 24 hours, Disp: , Rfl:      omeprazole (PRILOSEC) 20 MG DR capsule, Take 20 mg by mouth daily, Disp: , Rfl:      tamsulosin (FLOMAX) 0.4 MG capsule, Take 1 capsule (0.4 mg) by mouth daily, Disp: 30 capsule, Rfl: 3     calcium citrate-vitamin D (CITRACAL) 315-250 MG-UNIT TABS per tablet, Take 650 mg by mouth 2 times daily , Disp: , Rfl:      MAGNESIUM PO, Take 250 mg by mouth 2 times daily , Disp: , Rfl:      Nutritional Supplements (SALMON OIL) CAPS, Take 2 capsules by mouth daily , Disp: , Rfl:     Physical Exam:   BP (!) 116/92   Ht 1.829 m (6')   Wt 118.8 kg (262 lb)   BMI 35.53 kg/m     General: well appearing, no acute distress, pleasant male   MSK: full range of motion in all four extremities, no peripheral edema  Neuro: alert and oriented x3, CN  grossly intact       ECOG PS 1.    LABORATORY DATA:  Most Recent 3 CBC's:  Recent Labs   Lab Test 04/21/23  0810 03/20/23  0952 02/06/23  0902   WBC 5.3 4.9 4.1   HGB 12.0* 12.9* 12.4*   MCV 89 86 86    200 232   ANEUTAUTO 3.1 2.8 2.2     Most Recent 3 BMP's:  Recent Labs   Lab Test 04/21/23  0810 03/20/23  0952 02/06/23  0902    142 141   POTASSIUM 4.0 4.7 4.2   CHLORIDE 105 104 104   CO2 24 25 24   BUN 16.9 14.4 16.3   CR 1.04 1.07 1.08   ANIONGAP 11 13 13   BETHANY 9.2 9.8 9.2   * 110* 104*   PROTTOTAL 6.7 7.3 6.7   ALBUMIN 4.1 4.4 4.2    Most Recent 3 LFT's:  Recent Labs   Lab Test 04/21/23  0810 03/20/23  0952 02/06/23  0902   AST 19 24 36   ALT 19 19 20   ALKPHOS 67 58 49   BILITOTAL 0.2 0.4 0.2    Most Recent 2 TSH and T4:  Recent Labs   Lab Test 07/07/21  0834 03/31/21  0824   TSH 1.67 1.63                           I reviewed the above labs today.     Latest Reference Range & Units 01/09/23 07:36 02/06/23 09:02 03/20/23 09:52   Glucose 70 - 99 mg/dL 112 (H) 104 (H) 110 (H)   PSA Tumor Marker 0.00 - 4.50 ng/mL 0.66 0.86 1.54   Testosterone Total 240 - 950 ng/dL 6 (L) 6 (L)    (H): Data is abnormally high  (L): Data is abnormally low    12/6/22 repeat MRI  IMPRESSION:   1. Decreased enhancement within the C7 metastasis with decreased  epidural enhancement, consistent with response to therapy.  2. Radiation changes within the adjacent vertebrae.  3. No new metastases.  4. Mild degenerative change.  5. Small mass within the left deep lobe of the left parotid gland with  fluid-fluid level which may represent primary parotid tumor.  6. Slight fullness within the left vallecula which presumably  represents submucosal cyst. Direct visualization could be considered.      ASSESSMENT & PLAN: Mr. Reyes is a delightful 63 year old gentleman with metastatic castration sensitive prostate adenocarcinoma as well as an incidentally diagnosed stage III clear-cell renal cell carcinoma of the right  "kidney (s/p radical R nephrectomy 3/19/19), who is here for a follow-up visit and initiation of docetaxel for now metastatic castration-resistant prostate cancer.     1. Metastatic castration-resistant prostate cancer, with involvement of the bones and RPLN:   - Patient met the criteria for CHAARTED \"high-volume\" metastatic hormone-sensitive prostate cancer. He opted for docetaxel in combination with ADT (per JOSEFA, [id number], STAMPEDE).     Walter has now been off chemotherapy for about 8 weeks.  Last PSA was a decline compared to the previous.  PSMA PET scan Nov 2022 reveals persistent yusuf and bony disease that is evident as well as evidence of a lesion in C7. MRI C spine showed stability. Difficult to interpret PSMA avid scans in the context of a declining PSA in the case of a radiated site but did get additional radiation to sacrum with relief of symptoms.   --PSA up trending, will confirm if beneficial to get PSMA PET now given it is approaching 2.00 or if we wait.   --RTC next month as scheduled.   --Consider moving forward with lutetium 177 as next line of treatment  --Will do PSMA PET scan in June and will likely order Pluvicto after that scan    2. Bone metastases:   - Noted to have osseous metastatic disease at the time of diagnosis. S/p radiation to C spine and sacrum (Re-irradiation to sacrum).   - Encouraged to continue calcium and vitamin D supplementation.  - Will continue Zometa 4mg IV every ~3 months, will request for May  - pain resolved with radiation for now, otherwise tramadol and tylenol has been helpful in the past     3. Stage 3, UISS-high risk ccRCC: s/p R nephrectomy   - approximately 40-50% risk of recurrence after definitive surgery.   - no clear evidence of residual disease at this time; the retroperitoneal lymphadenopathy is more consistent with metastatic prostate cancer   - Continue active surveillance with self-reporting for signs/symptoms of recurrence (this was previously " explained in detail) and periodic H&P and scans.

## 2023-04-24 NOTE — NURSING NOTE
Is the patient currently in the state of [place]? YES    Visit mode:VIDEO    If the visit is dropped, the patient can be reconnected by: VIDEO VISIT: Send to e-mail at: [email or web address]    Will anyone else be joining the visit? Yes       How would you like to obtain your AVS? MyChart    Are changes needed to the allergy or medication list? NO    Reason for visit: Video Visit (Follow up )      Nika Kong

## 2023-04-24 NOTE — Clinical Note
"    4/24/2023         RE: Walter Reyes  [address] [address] [facility] [address]        Dear Colleague,    Thank you for referring your patient, Walter Reyes, to the [facility]. Please see a copy of my visit note below.    Virtual Visit Details    Type of service:  Video Visit     Originating Location (pt. Location): {video visit patient location:678560::\"Home\"}  {PROVIDER LOCATION On-site should be selected for visits conducted from your clinic location or adjoining [facility] hospital, academic office, or other nearby [facility] building. Off-site should be selected for all other provider locations, including home:[id number]}  Distant Location (provider location):  {virtual location provider:319548}  Platform used for Video Visit: {Virtual Visit Platforms:978012::\"AmWell\"}    Virtual Visit Details    Type of service:  Video Visit     Originating Location (pt. Location): Home  {PROVIDER LOCATION On-site should be selected for visits conducted from your clinic location or adjoining [facility] hospital, academic office, or other nearby [facility] building. Off-site should be selected for all other provider locations, including home:[id number]}  Distant Location (provider location):  {virtual location provider:186402}  Platform used for Video Visit: {Virtual Visit Platforms:970857::\"AmWell\"}       [facility] Oncology Followup  Oncologist: Dr. Jj Winters  Apr 24, 2023    REASON FOR VISIT: Follow-up for metastatic castration-resistant prostate cancer    HISTORY OF PRESENT ILLNESS: Mr. 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 His oncologic history is detailed below.     1/6/21  PSA = 0.29  3/31/21 PSA = 0.44  7/7/21  PSA = 0.47  11/2021 PSA = 1.05  -- Start XTANDI  12/16/21 PSA =0.22  2/11/22 PSA= " "0.50  3/22/22 PSA=0.72  5/5/2022 PSA=1.79  7/11/2022 PSA=2.16 --Start cycle 1 docetaxel   8/22/22 PSA = 1.36  9/12/22 PSA = 1.09  10/24/22 Cycle #6 of Docetaxel. End of treatment  1/9/23  PSA =0.66  3/20/23  PSA=1.54    Interval History:.     ONCOLOGIC HISTORY:  1. 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 Naylor at [facility]. Prior PSA were 1.4 on 8/16/17, 2.4 on 6/28/16, 2.9 on 6/28/16, and as low as 0.4 on 3/26/2003.   - 1/08/2019: Consultation with Sarah Wilson CNP in Urology clinic. Repeat PSA 9.6.  - 1/16/2019: MRI prostate with contrast - \"This examination is characterized as PIRADS 5- very high probability. Clinically significant cancer is highly likely to be present. There is a large, invasive mass arising from the right peripheral zone and extending into the neurovascular bundle, seminal vesicle, and along the anterolateral right mesorectal fascia. Metastatic right external iliac lymph node. Metastatic lesion in the posterior/superior right acetabulum.\"  - 1/25/2019: CT abdomen and pelvis with IV contrast - \"1. Heterogeneous enhancing mass posteriorly in the upper pole of the right kidney measures 4.5 x 5.8 x 5.7 cm (AP by transverse by craniocaudal). It has a small nodular component extending posterior medially which abuts the right psoas muscle. This nodular extension measures 2.1 x 2.1 cm. Minimal stranding about the mass. No definite thrombus within the right renal vein. This renal mass is compatible with a renal cell carcinoma. A paraaortic lymph node situated immediately posterior to the left renal vein measures 1.1 cm in short axis, suspicious for metastasis. 2. A 2 cm right external iliac lymph node is also suspicious for metastases. 3. Multiple sclerotic osseous lesions suspicious for metastases. These include 0.8 and " "0.6 cm sclerotic lesions laterally in the right iliac wing (images 53 and 62 respectively). Ill-defined groundglass density laterally in the right acetabulum measuring 1.7 x 1.2 cm corresponds with the lesion identified on MRI. A 0.4 cm groundglass density in the left acetabulum. Sclerotic lesion in the left femoral neck measures 0.9 x 1.6 cm (image 81). Sclerotic metastases would be more compatible with prostate metastases. 4. A 3 subcentimeter hepatic lesions are indeterminate. Metastases would be a consideration. These can be further characterized with liver MRI.\"  - 1/25/2019: NM bone scan - \"There is focal bony uptake in the left femoral neck, right acetabulum, right of midline at the S1 or L5 level of the spine, within multiple bilateral ribs, in the C7 or T1 level of the spine, and anteriorly within the skull. Findings are suspicious for metastases.\"  - 1/31/19: CT chest with contrast - \" No suspicious nodules in the chest.  Stable appearance of a 5.8 cm right renal mass concerning for renal carcinoma until proven otherwise.  Stable indeterminant subcentimeter hypodensities in the liver.  Multifocal osteoblastic metastasis including 2.5 cm lesion in the right fifth rib posteriorly and a 1.6 cm lesion in the right third rib posteriorly. No lytic lesions identified.\"  - 2/6/19: CT-guided right sclerotic fifth rib lesion biopsy - \"Metastatic carcinoma, consistent with prostate primary.  Immunohistochemical stains performed show the metastatic carcinoma stains positive for NKX3.1 (prostate marker), negative for STACIE 3 and PAX8, which supports the above diagnosis.\"  - 2/15/19: Started Casodex 50mg every day and consented for the biobanking protocol. 3/18/19 - stopped Casodex.  - 2/19/19: Case discussed in tumor board - recommendation for nephectomy for suspected malignant right renal mass.   - 2/26/19: Started Lupron 22.5mg every 3 months.   - 5/8/19: Started Docetaxel 75mg/m2 IV every 3 weeks. 06/18/19 - cycle 3, " "7/10/19 - cycle 4, 07/31/19 - cycle 5, 08/21/19 - cycle 6.   - 10/2/19: Restaging CT CAP and NM Bone Scan showed improved osseous disease, no evidence of recurrent or new metastatic disease.  - 1/5/20: Restaging CT CAP and NM Bone Scan showed stable osseous disease, no evidence of recurrent or new metastatic disease.  - 4/6/20: NM bone scan with slightly improved uptake in known skeletal mets. CT C/A/P with contrast with stable osseous mets, no yusuf enlargement, no new visceral mets etc.  - 04/08/20: Zometa every 3 months.  - 10/5/20: CT C/A/P with contrast and NM bone scan - SD.   - 1/4/21: CT C/A/P with contrast and NM bone scan - SD but slight increase in right 5th rib tracer uptake and in posterior L5 sclerosis.   - 03/29/21: CT C/A/P with contrast - single new right sacral 8mm bone lesion (suspicious), other bone mets stable. No visceral/yusuf disease. Nephrectomy site without recurrence. NM bone scan - SD but \"increased uptake associated with the prior lesions of the lower  cervical spine, posterior right fourth rib and right L5 posterior arch elements. Otherwise unchanged uptake associated with the proximal left femur and left anterior fourth rib. Similar uptake of the left halux, possibly secondary to degenerative osteoarthritis or gout.\"      2. Stage III ([id number]), grade 3 of 4, clear-cell RCC of right kidney:  - Incidentally diagnosed as above.   - Underwent curative-intent robotic right radical nephrectomy with Dr. Wesley Cleveland on 3/19/19. No tumor spillage per op report.   - Path showed: \"Histologic Type: Clear cell renal cell carcinoma; Sarcomatoid Features: Not identified; Histologic Grade: Nucleolar grade 3 (WHO/ISUP). Extent Tumor Size: 4.3 cm. Microscopic Tumor Extension: Tumor extension into renal sinus (in vascular structures). Margins: Negative. Tumor Necrosis: Present; focal. Lymph-Vascular Invasion: Not identified.  Pathologic Staging (pTNM) Primary Tumor (pT): pT3a: Tumor extends into " "renal vein branches/renal sinus. Regional Lymph Nodes (pN): pNX. Number of Lymph Nodes Examined: 0 Distant Metastasis (pM): pM N/A.\"  - Restaging scans as above.    INTERVAL HISTORY:   Walter is here for follow up. His left leg pain/radiculopathy completed resolved with one fraction of radiation. No new complaints.     PAST MEDICAL HISTORY:  Past Medical History:   Diagnosis Date     Cancer of kidney, right (H)      Complication of anesthesia     slow wakeup      Malignant neoplasm of prostate metastatic to bone (H) 2/14/2019     Thyroid disease      PAST SURGICAL HISTORY:   Past Surgical History:   Procedure Laterality Date     CYSTOSCOPY      about 10 years ago     INSERT PORT VASCULAR ACCESS Right 5/2/2019    Procedure: Chest Port Placement;  Surgeon: Tate Burciaga PA-C;  Location: [facility] OR     IR CHEST PORT PLACEMENT > 5 YRS OF AGE  5/2/2019     LAPAROSCOPIC NEPHRECTOMY Right 3/19/2019    Procedure: Right laparoscopic radical nephrectomy;  Surgeon: Wesley Cleveland MD;  Location: [facility] OR      SOCIAL HISTORY:   Social History     Tobacco Use     Smoking status: Never     Smokeless tobacco: Never   Substance Use Topics     Drug use: No     FAMILY HISTORY:   Family History   Problem Relation Age of Onset     Diabetes Father      ALLERGIES:   Allergies   Allergen Reactions     Amlodipine Other (See Comments)     Leg swelling     CURRENT MEDICATIONS:   Current Outpatient Medications:      atorvastatin (LIPITOR) 20 MG tablet, Take 60 mg by mouth daily , Disp: , Rfl:      Barberry-Oreg Grape-Goldenseal (BERBERINE COMPLEX PO), , Disp: , Rfl:      cycloSPORINE (RESTASIS) 0.05 % ophthalmic emulsion, Place 1 drop into both eyes 2 times daily , Disp: , Rfl:      fluconazole (DIFLUCAN) 200 MG tablet, Take 1 tablet (200 mg) by mouth daily, Disp: 60 tablet, Rfl: 1     Glucosamine-Chondroit-Vit C-Mn (GLUCOSAMINE 1500 COMPLEX) CAPS, Take 1 capsule by mouth daily, Disp: , Rfl:      levothyroxine " (SYNTHROID/LEVOTHROID) 112 MCG tablet, Take 112 mcg by mouth daily, Disp: , Rfl:      loratadine (CLARITIN) 10 MG tablet, , Disp: , Rfl:      MEBENDAZOLE PO, Take 225 mg by mouth daily , Disp: , Rfl:      metFORMIN (GLUCOPHAGE XR) 500 MG 24 hr tablet, Take 1 tablet with breakfast, 2 tablets with dinner., Disp: , Rfl:      Multiple Vitamins-Minerals (MULTIVITAMIN ADULT EXTRA C PO), Take 1 tablet by mouth every 24 hours, Disp: , Rfl:      omeprazole (PRILOSEC) 20 MG DR capsule, Take 20 mg by mouth daily, Disp: , Rfl:      tamsulosin (FLOMAX) 0.4 MG capsule, Take 1 capsule (0.4 mg) by mouth daily, Disp: 30 capsule, Rfl: 3     calcium citrate-vitamin D (CITRACAL) 315-250 MG-UNIT TABS per tablet, Take 650 mg by mouth 2 times daily , Disp: , Rfl:      MAGNESIUM PO, Take 250 mg by mouth 2 times daily , Disp: , Rfl:      Nutritional Supplements (SALMON OIL) CAPS, Take 2 capsules by mouth daily , Disp: , Rfl:     Physical Exam:   BP (!) 116/92   Ht 1.829 m (6')   Wt 118.8 kg (262 lb)   BMI 35.53 kg/m     General: well appearing, no acute distress, pleasant male   MSK: full range of motion in all four extremities, no peripheral edema  Neuro: alert and oriented x3, CN grossly intact       ECOG PS 1.    LABORATORY DATA:  Most Recent 3 CBC's:  Recent Labs   Lab Test 04/21/23  0810 03/20/23  0952 02/06/23  0902   WBC 5.3 4.9 4.1   HGB 12.0* 12.9* 12.4*   MCV 89 86 86    200 232   ANEUTAUTO 3.1 2.8 2.2     Most Recent 3 BMP's:  Recent Labs   Lab Test 04/21/23  0810 03/20/23  0952 02/06/23  0902    142 141   POTASSIUM 4.0 4.7 4.2   CHLORIDE 105 104 104   CO2 24 25 24   BUN 16.9 14.4 16.3   CR 1.04 1.07 1.08   ANIONGAP 11 13 13   BETHANY 9.2 9.8 9.2   * 110* 104*   PROTTOTAL 6.7 7.3 6.7   ALBUMIN 4.1 4.4 4.2    Most Recent 3 LFT's:  Recent Labs   Lab Test 04/21/23  0810 03/20/23  0952 02/06/23  0902   AST 19 24 36   ALT 19 19 20   ALKPHOS 67 58 49   BILITOTAL 0.2 0.4 0.2    Most Recent 2 TSH and T4:  Recent Labs  "  Lab Test 07/07/21  0834 03/31/21  0824   TSH 1.67 1.63                           I reviewed the above labs today.     Latest Reference Range & Units 01/09/23 07:36 02/06/23 09:02 03/20/23 09:52   Glucose 70 - 99 mg/dL 112 (H) 104 (H) 110 (H)   PSA Tumor Marker 0.00 - 4.50 ng/mL 0.66 0.86 1.54   Testosterone Total 240 - 950 ng/dL 6 (L) 6 (L)    (H): Data is abnormally high  (L): Data is abnormally low    12/6/22 repeat MRI  IMPRESSION:   1. Decreased enhancement within the C7 metastasis with decreased  epidural enhancement, consistent with response to therapy.  2. Radiation changes within the adjacent vertebrae.  3. No new metastases.  4. Mild degenerative change.  5. Small mass within the left deep lobe of the left parotid gland with  fluid-fluid level which may represent primary parotid tumor.  6. Slight fullness within the left vallecula which presumably  represents submucosal cyst. Direct visualization could be considered.      ASSESSMENT & PLAN: Mr. Reyes is a delightful 63 year old gentleman with metastatic castration sensitive prostate adenocarcinoma as well as an incidentally diagnosed stage III clear-cell renal cell carcinoma of the right kidney (s/p radical R nephrectomy 3/19/19), who is here for a follow-up visit and initiation of docetaxel for now metastatic castration-resistant prostate cancer.     1. Metastatic castration-resistant prostate cancer, with involvement of the bones and RPLN:   - Patient met the criteria for CHAARTED \"high-volume\" metastatic hormone-sensitive prostate cancer. He opted for docetaxel in combination with ADT (per CHAARTED, [id number], FELIPEEDE).     Walter has now been off chemotherapy for about 8 weeks.  Last PSA was a decline compared to the previous.  PSMA PET scan Nov 2022 reveals persistent yusuf and bony disease that is evident as well as evidence of a lesion in C7. MRI C spine showed stability. Difficult to interpret PSMA avid scans in the context of a " declining PSA in the case of a radiated site but did get additional radiation to sacrum with relief of symptoms.   --PSA up trending, will confirm if beneficial to get PSMA PET now given it is approaching 2.00 or if we wait.   --RTC with Dr. Winters next month as scheduled.   --Consider moving forward with lutetium 177 as next line of treatment  --Will do PSMA PET scan in June and will likely order Pluvicto after that scan    2. Bone metastases:   - Noted to have osseous metastatic disease at the time of diagnosis. S/p radiation to C spine and sacrum (Re-irradiation to sacrum).   - Encouraged to continue calcium and vitamin D supplementation.  - Will continue Zometa 4mg IV every ~3 months, will request for May  - pain resolved with radiation for now, otherwise tramadol and tylenol has been helpful in the past     3. Stage 3, UISS-high risk ccRCC: s/p R nephrectomy   - approximately 40-50% risk of recurrence after definitive surgery.   - no clear evidence of residual disease at this time; the retroperitoneal lymphadenopathy is more consistent with metastatic prostate cancer   - Continue active surveillance with self-reporting for signs/symptoms of recurrence (this was previously explained in detail) and periodic H&P and scans.                   Again, thank you for allowing me to participate in the care of your patient.        Sincerely,        Jj Winters MD

## 2023-04-25 LAB — TESTOST SERPL-MCNC: 15 NG/DL (ref 240–950)

## 2023-05-03 ENCOUNTER — INFUSION THERAPY VISIT (OUTPATIENT)
Dept: INFUSION THERAPY | Facility: CLINIC | Age: 63
End: 2023-05-03
Attending: INTERNAL MEDICINE
Payer: COMMERCIAL

## 2023-05-03 ENCOUNTER — HOSPITAL ENCOUNTER (OUTPATIENT)
Dept: CT IMAGING | Facility: CLINIC | Age: 63
Discharge: HOME | End: 2023-05-03
Attending: OTOLARYNGOLOGY | Admitting: OTOLARYNGOLOGY
Payer: COMMERCIAL

## 2023-05-03 VITALS
BODY MASS INDEX: 35.4 KG/M2 | HEART RATE: 77 BPM | RESPIRATION RATE: 16 BRPM | TEMPERATURE: 97.8 F | SYSTOLIC BLOOD PRESSURE: 118 MMHG | WEIGHT: 261 LBS | OXYGEN SATURATION: 97 % | DIASTOLIC BLOOD PRESSURE: 80 MMHG

## 2023-05-03 DIAGNOSIS — C79.51 MALIGNANT NEOPLASM OF PROSTATE METASTATIC TO BONE (H): Primary | ICD-10-CM

## 2023-05-03 DIAGNOSIS — K11.8 PAROTID MASS: ICD-10-CM

## 2023-05-03 DIAGNOSIS — C61 MALIGNANT NEOPLASM OF PROSTATE METASTATIC TO BONE (H): Primary | ICD-10-CM

## 2023-05-03 PROCEDURE — 250N000011 HC RX IP 250 OP 636: Performed by: OTOLARYNGOLOGY

## 2023-05-03 PROCEDURE — 96402 CHEMO HORMON ANTINEOPL SQ/IM: CPT

## 2023-05-03 PROCEDURE — 250N000009 HC RX 250: Performed by: OTOLARYNGOLOGY

## 2023-05-03 PROCEDURE — 96365 THER/PROPH/DIAG IV INF INIT: CPT

## 2023-05-03 PROCEDURE — 250N000011 HC RX IP 250 OP 636: Performed by: NURSE PRACTITIONER

## 2023-05-03 PROCEDURE — 70491 CT SOFT TISSUE NECK W/DYE: CPT

## 2023-05-03 PROCEDURE — 250N000011 HC RX IP 250 OP 636: Performed by: INTERNAL MEDICINE

## 2023-05-03 RX ORDER — ZOLEDRONIC ACID 0.04 MG/ML
4 INJECTION, SOLUTION INTRAVENOUS ONCE
Status: COMPLETED | OUTPATIENT
Start: 2023-05-03 | End: 2023-05-03

## 2023-05-03 RX ORDER — IOPAMIDOL 755 MG/ML
500 INJECTION, SOLUTION INTRAVASCULAR ONCE
Status: COMPLETED | OUTPATIENT
Start: 2023-05-03 | End: 2023-05-03

## 2023-05-03 RX ORDER — TRIAMTERENE/HYDROCHLOROTHIAZID 37.5-25 MG
1 TABLET ORAL DAILY
COMMUNITY
Start: 2023-04-20

## 2023-05-03 RX ORDER — HEPARIN SODIUM (PORCINE) LOCK FLUSH IV SOLN 100 UNIT/ML 100 UNIT/ML
5 SOLUTION INTRAVENOUS
Status: DISCONTINUED | OUTPATIENT
Start: 2023-05-03 | End: 2023-05-03 | Stop reason: HOSPADM

## 2023-05-03 RX ORDER — HEPARIN SODIUM (PORCINE) LOCK FLUSH IV SOLN 100 UNIT/ML 100 UNIT/ML
5 SOLUTION INTRAVENOUS
Status: CANCELLED | OUTPATIENT
Start: 2023-05-09

## 2023-05-03 RX ORDER — HEPARIN SODIUM,PORCINE 10 UNIT/ML
5 VIAL (ML) INTRAVENOUS
Status: CANCELLED | OUTPATIENT
Start: 2023-05-09

## 2023-05-03 RX ORDER — ZOLEDRONIC ACID 0.04 MG/ML
4 INJECTION, SOLUTION INTRAVENOUS ONCE
Status: CANCELLED | OUTPATIENT
Start: 2023-05-09 | End: 2023-05-09

## 2023-05-03 RX ADMIN — LEUPROLIDE ACETATE 22.5 MG: KIT at 11:18

## 2023-05-03 RX ADMIN — IOPAMIDOL 100 ML: 755 INJECTION, SOLUTION INTRAVENOUS at 08:05

## 2023-05-03 RX ADMIN — SODIUM CHLORIDE 65 ML: 9 INJECTION, SOLUTION INTRAVENOUS at 08:05

## 2023-05-03 RX ADMIN — Medication 5 ML: at 11:34

## 2023-05-03 RX ADMIN — ZOLEDRONIC ACID 4 MG: 0.04 INJECTION, SOLUTION INTRAVENOUS at 11:16

## 2023-05-03 NOTE — PROGRESS NOTES
Infusion Nursing Note:  Walter Reyes presents today for Zometa + Lupron.    Patient seen by provider today: No   present during visit today: Not Applicable.    Note: Walter reports he is feeling well today.  He denies any recent or upcoming dental work.  He denies tooth, mouth, or jaw pain.  He confirms that he is taking Calcium and Vitamin D supplement.    Lupron given in left upper outer buttock.    Walter reports that he has a new tender area in left lower outer breast that started a couple days ago.  He denies any new lumps or bumps and also denies any injury to this area.  He states he prefers to monitor this for now.  Advised him to notify his provider if this persists for 2 weeks.  He verbalized understanding.    Intravenous Access:  Implanted Port accessed this morning in imaging department.    Treatment Conditions:  Lab Results   Component Value Date     04/21/2023    POTASSIUM 4.0 04/21/2023    CR 1.04 04/21/2023    BETHANY 9.2 04/21/2023    BILITOTAL 0.2 04/21/2023    ALBUMIN 4.1 04/21/2023    ALT 19 04/21/2023    AST 19 04/21/2023     Results reviewed, labs MET treatment parameters, ok to proceed with treatment.    Post Infusion Assessment:  Patient tolerated infusion without incident.  Patient tolerated injection without incident.  Blood return noted pre and post infusion.  Site patent and intact, free from redness, edema or discomfort.  No evidence of extravasations.  Access discontinued per protocol.     Discharge Plan:   Discharge instructions reviewed with: Patient.  Patient and/or family verbalized understanding of discharge instructions and all questions answered.  AVS to patient via [employer].  Patient will schedule next appointments, due in 3 months.  Patient discharged in stable condition accompanied by: self.  Departure Mode: Ambulatory.      Parth Meyer RN

## 2023-05-05 DIAGNOSIS — C79.51 MALIGNANT NEOPLASM OF PROSTATE METASTATIC TO BONE (H): Primary | ICD-10-CM

## 2023-05-05 DIAGNOSIS — C61 MALIGNANT NEOPLASM OF PROSTATE METASTATIC TO BONE (H): Primary | ICD-10-CM

## 2023-05-05 RX ORDER — HEPARIN SODIUM (PORCINE) LOCK FLUSH IV SOLN 100 UNIT/ML 100 UNIT/ML
5 SOLUTION INTRAVENOUS
Status: CANCELLED | OUTPATIENT
Start: 2023-05-05

## 2023-05-05 RX ORDER — DIPHENHYDRAMINE HYDROCHLORIDE 50 MG/ML
50 INJECTION INTRAMUSCULAR; INTRAVENOUS
Status: CANCELLED
Start: 2023-05-05

## 2023-05-05 RX ORDER — ALBUTEROL SULFATE 90 UG/1
1-2 AEROSOL, METERED RESPIRATORY (INHALATION)
Status: CANCELLED
Start: 2023-05-05

## 2023-05-05 RX ORDER — ALBUTEROL SULFATE 0.83 MG/ML
2.5 SOLUTION RESPIRATORY (INHALATION)
Status: CANCELLED | OUTPATIENT
Start: 2023-05-05

## 2023-05-05 RX ORDER — EPINEPHRINE 1 MG/ML
0.3 INJECTION, SOLUTION, CONCENTRATE INTRAVENOUS EVERY 5 MIN PRN
Status: CANCELLED | OUTPATIENT
Start: 2023-05-05

## 2023-05-05 RX ORDER — NALOXONE HYDROCHLORIDE 0.4 MG/ML
0.2 INJECTION, SOLUTION INTRAMUSCULAR; INTRAVENOUS; SUBCUTANEOUS
Status: CANCELLED | OUTPATIENT
Start: 2023-05-05

## 2023-05-05 RX ORDER — LORAZEPAM 2 MG/ML
0.5 INJECTION INTRAMUSCULAR EVERY 4 HOURS PRN
Status: CANCELLED | OUTPATIENT
Start: 2023-05-05

## 2023-05-05 RX ORDER — MEPERIDINE HYDROCHLORIDE 25 MG/ML
25 INJECTION INTRAMUSCULAR; INTRAVENOUS; SUBCUTANEOUS EVERY 30 MIN PRN
Status: CANCELLED | OUTPATIENT
Start: 2023-05-05

## 2023-05-05 RX ORDER — HEPARIN SODIUM,PORCINE 10 UNIT/ML
5 VIAL (ML) INTRAVENOUS
Status: CANCELLED | OUTPATIENT
Start: 2023-05-05

## 2023-05-08 ENCOUNTER — PATIENT OUTREACH (OUTPATIENT)
Dept: OTOLARYNGOLOGY | Facility: CLINIC | Age: 63
End: 2023-05-08
Payer: COMMERCIAL

## 2023-05-08 NOTE — TELEPHONE ENCOUNTER
Called patient to review results from CT scan.    IMPRESSION:    1. No appreciable change of small lesion within the deep lobe of the  left parotid gland which could represent a primary parotid lesion,  better demonstrated on the prior MRI.  2. Otherwise, no evidence of soft tissue neck mass or cervical  lymphadenopathy.  3. Multifocal osseous metastatic disease most conspicuous involving  the C7 vertebra, left first rib, and right fifth rib.        Plan for repeat imaging in 1 year. Patient agreeable with plan of care. Patient will call clinic with further questions or concerns.

## 2023-05-18 DIAGNOSIS — C61 PROSTATE CANCER (H): Primary | ICD-10-CM

## 2023-05-22 DIAGNOSIS — C79.51 MALIGNANT NEOPLASM OF PROSTATE METASTATIC TO BONE (H): Primary | ICD-10-CM

## 2023-05-22 DIAGNOSIS — C61 MALIGNANT NEOPLASM OF PROSTATE METASTATIC TO BONE (H): Primary | ICD-10-CM

## 2023-05-22 RX ORDER — ALBUTEROL SULFATE 90 UG/1
1-2 AEROSOL, METERED RESPIRATORY (INHALATION)
Status: CANCELLED
Start: 2023-05-22

## 2023-05-22 RX ORDER — LORAZEPAM 2 MG/ML
.5-1 INJECTION INTRAMUSCULAR EVERY 6 HOURS PRN
Status: CANCELLED | OUTPATIENT
Start: 2023-05-22

## 2023-05-22 RX ORDER — PROCHLORPERAZINE MALEATE 5 MG
10 TABLET ORAL EVERY 6 HOURS PRN
Status: CANCELLED
Start: 2023-05-22

## 2023-05-22 RX ORDER — ONDANSETRON 4 MG/1
8 TABLET, FILM COATED ORAL
Status: CANCELLED | OUTPATIENT
Start: 2023-05-22

## 2023-05-22 RX ORDER — LORAZEPAM 0.5 MG/1
.5-1 TABLET ORAL EVERY 6 HOURS PRN
Status: CANCELLED
Start: 2023-05-22

## 2023-05-22 RX ORDER — DIPHENHYDRAMINE HYDROCHLORIDE 50 MG/ML
50 INJECTION INTRAMUSCULAR; INTRAVENOUS
Status: CANCELLED
Start: 2023-05-22

## 2023-05-22 RX ORDER — MEPERIDINE HYDROCHLORIDE 25 MG/ML
25 INJECTION INTRAMUSCULAR; INTRAVENOUS; SUBCUTANEOUS EVERY 30 MIN PRN
Status: CANCELLED | OUTPATIENT
Start: 2023-05-22

## 2023-05-22 RX ORDER — ALBUTEROL SULFATE 0.83 MG/ML
2.5 SOLUTION RESPIRATORY (INHALATION)
Status: CANCELLED | OUTPATIENT
Start: 2023-05-22

## 2023-05-22 RX ORDER — EPINEPHRINE 1 MG/ML
0.3 INJECTION, SOLUTION, CONCENTRATE INTRAVENOUS EVERY 5 MIN PRN
Status: CANCELLED | OUTPATIENT
Start: 2023-05-22

## 2023-05-30 ENCOUNTER — NURSE TRIAGE (OUTPATIENT)
Dept: ONCOLOGY | Facility: CLINIC | Age: 63
End: 2023-05-30
Payer: COMMERCIAL

## 2023-05-30 DIAGNOSIS — G89.3 PAIN FROM BONE METASTASES (H): ICD-10-CM

## 2023-05-30 DIAGNOSIS — C79.51 MALIGNANT NEOPLASM OF PROSTATE METASTATIC TO BONE (H): ICD-10-CM

## 2023-05-30 DIAGNOSIS — M79.604 LEG PAIN, BILATERAL: ICD-10-CM

## 2023-05-30 DIAGNOSIS — M53.3 SACRAL BACK PAIN: Primary | ICD-10-CM

## 2023-05-30 DIAGNOSIS — C79.51 PAIN FROM BONE METASTASES (H): ICD-10-CM

## 2023-05-30 DIAGNOSIS — C79.51 MALIGNANT NEOPLASM OF PROSTATE METASTATIC TO BONE (H): Primary | ICD-10-CM

## 2023-05-30 DIAGNOSIS — C61 MALIGNANT NEOPLASM OF PROSTATE METASTATIC TO BONE (H): ICD-10-CM

## 2023-05-30 DIAGNOSIS — M79.605 LEG PAIN, BILATERAL: ICD-10-CM

## 2023-05-30 DIAGNOSIS — C61 MALIGNANT NEOPLASM OF PROSTATE METASTATIC TO BONE (H): Primary | ICD-10-CM

## 2023-05-30 RX ORDER — DEXAMETHASONE 1 MG
0.5 TABLET ORAL 2 TIMES DAILY WITH MEALS
Qty: 14 TABLET | Refills: 0 | Status: SHIPPED | OUTPATIENT
Start: 2023-05-30 | End: 2023-07-21

## 2023-05-30 NOTE — TELEPHONE ENCOUNTER
Oncology Nurse Triage - Reporting Symptoms    Situation:   Walter reporting the following symptoms:buttocks bot checks and radiates down into legs he thinks it is sciatic pain happening       Background:   Treating Provider:   Dr Winters     Date of last office visit: 4/24/23 Dr Winters     Recent treatments: No not aon chemo anymore will be starting Pluvicto in a couple weeks.       Assessment  Onset of symptoms: started 4 weeks afo messaged care team and talked to triage nurse. Tylenol and tramadol was enough.   Uses tylenol during the day along with ice helps during the day but at night takes tylenol and tramadol do not seem to be helping as much anymore.   Pain is worse in early morning hours. Over the past 2 weeks pain has been increasing.   Palliative care nurse that he knows was wondering if gabapentin  Would be a better option for pain control since this seems like sciatic pain.   Has been using ice but only gives relief for a little while and then pain comes back.     Dr Winters thinks tumor in back is growing so that is what is causing his pain.     Pharmacy is [facility] in [place].     Recommendations:   Advised to continue with tylenol and tramadol and will send message to Dr Winters to see if he thinks Gabapentin would be helpful for pain control in lower back and legs.

## 2023-05-30 NOTE — TELEPHONE ENCOUNTER
"Walter (pt) is wondering about status of Gabapentin for pain management?     1440 Paged Dr. Winters.     1443 Per Dr. Winters, pt has hx of radiation in Pelvic bone, Is pt having any problems with walking, balance, bladder, bowel and if pain below knee?   \"If shooting down below knee, then may consider MRI to rule compression\"     1446 Walter denies incontinence of bowl and bladder. Pain started on 5/18 and has increased intensity each day.   Sometimes when gets up in morning, pain radiates/aches down below knee.  For first 30minutes to 1hr after waking up, legs feel a little weak and off balance but then after a while pain reduces and less debilitating, but still feels throughout the day.   Lower back pain is felt near sacral pain,   Sharp pain through butt that shoots down legs.   Pain is worse at night, pt is a side sleeper.   Pain rates 6-8/10 and during day 3-4/10.     1457 repaged Dr. Winters Telephone order with read back, Dexamethasone 0.5mg twice daily. For 14days. Take 0.5mg  in morning and afternoon. Dr. Winters will enter orders in for future MRI.     1531 This writer called and relayed information to Pt. Asked pt to repeat back info/plan.  Pt was able to reverbalize understanding and will follow up with [email or web address] pharmacy about status of prescription.    "

## 2023-06-01 DIAGNOSIS — C79.51 MALIGNANT NEOPLASM OF PROSTATE METASTATIC TO BONE (H): Primary | ICD-10-CM

## 2023-06-01 DIAGNOSIS — C61 MALIGNANT NEOPLASM OF PROSTATE METASTATIC TO BONE (H): Primary | ICD-10-CM

## 2023-06-01 RX ORDER — ALBUTEROL SULFATE 0.83 MG/ML
2.5 SOLUTION RESPIRATORY (INHALATION)
Status: CANCELLED | OUTPATIENT
Start: 2023-08-30

## 2023-06-01 RX ORDER — LORAZEPAM 0.5 MG/1
.5-1 TABLET ORAL EVERY 6 HOURS PRN
Status: CANCELLED
Start: 2023-08-30

## 2023-06-01 RX ORDER — PROCHLORPERAZINE MALEATE 5 MG
10 TABLET ORAL EVERY 6 HOURS PRN
Status: CANCELLED
Start: 2023-08-30

## 2023-06-01 RX ORDER — ALBUTEROL SULFATE 0.83 MG/ML
2.5 SOLUTION RESPIRATORY (INHALATION)
Status: CANCELLED | OUTPATIENT
Start: 2023-07-19

## 2023-06-01 RX ORDER — ALBUTEROL SULFATE 90 UG/1
1-2 AEROSOL, METERED RESPIRATORY (INHALATION)
Status: CANCELLED
Start: 2023-08-30

## 2023-06-01 RX ORDER — MEPERIDINE HYDROCHLORIDE 25 MG/ML
25 INJECTION INTRAMUSCULAR; INTRAVENOUS; SUBCUTANEOUS EVERY 30 MIN PRN
Status: CANCELLED | OUTPATIENT
Start: 2023-08-30

## 2023-06-01 RX ORDER — ONDANSETRON 4 MG/1
8 TABLET, FILM COATED ORAL
Status: CANCELLED | OUTPATIENT
Start: 2023-07-19

## 2023-06-01 RX ORDER — DIPHENHYDRAMINE HYDROCHLORIDE 50 MG/ML
50 INJECTION INTRAMUSCULAR; INTRAVENOUS
Status: CANCELLED
Start: 2023-07-19

## 2023-06-01 RX ORDER — MEPERIDINE HYDROCHLORIDE 25 MG/ML
25 INJECTION INTRAMUSCULAR; INTRAVENOUS; SUBCUTANEOUS EVERY 30 MIN PRN
Status: CANCELLED | OUTPATIENT
Start: 2023-07-19

## 2023-06-01 RX ORDER — LORAZEPAM 2 MG/ML
.5-1 INJECTION INTRAMUSCULAR EVERY 6 HOURS PRN
Status: CANCELLED | OUTPATIENT
Start: 2023-07-19

## 2023-06-01 RX ORDER — LORAZEPAM 2 MG/ML
.5-1 INJECTION INTRAMUSCULAR EVERY 6 HOURS PRN
Status: CANCELLED | OUTPATIENT
Start: 2023-08-30

## 2023-06-01 RX ORDER — DIPHENHYDRAMINE HYDROCHLORIDE 50 MG/ML
50 INJECTION INTRAMUSCULAR; INTRAVENOUS
Status: CANCELLED
Start: 2023-08-30

## 2023-06-01 RX ORDER — ALBUTEROL SULFATE 90 UG/1
1-2 AEROSOL, METERED RESPIRATORY (INHALATION)
Status: CANCELLED
Start: 2023-07-19

## 2023-06-01 RX ORDER — EPINEPHRINE 1 MG/ML
0.3 INJECTION, SOLUTION, CONCENTRATE INTRAVENOUS EVERY 5 MIN PRN
Status: CANCELLED | OUTPATIENT
Start: 2023-08-30

## 2023-06-01 RX ORDER — EPINEPHRINE 1 MG/ML
0.3 INJECTION, SOLUTION, CONCENTRATE INTRAVENOUS EVERY 5 MIN PRN
Status: CANCELLED | OUTPATIENT
Start: 2023-07-19

## 2023-06-01 RX ORDER — ONDANSETRON 4 MG/1
8 TABLET, FILM COATED ORAL
Status: CANCELLED | OUTPATIENT
Start: 2023-08-30

## 2023-06-01 RX ORDER — LORAZEPAM 0.5 MG/1
.5-1 TABLET ORAL EVERY 6 HOURS PRN
Status: CANCELLED
Start: 2023-07-19

## 2023-06-01 RX ORDER — PROCHLORPERAZINE MALEATE 5 MG
10 TABLET ORAL EVERY 6 HOURS PRN
Status: CANCELLED
Start: 2023-07-19

## 2023-06-02 DIAGNOSIS — C61 MALIGNANT NEOPLASM OF PROSTATE METASTATIC TO BONE (H): Primary | ICD-10-CM

## 2023-06-02 DIAGNOSIS — C79.51 MALIGNANT NEOPLASM OF PROSTATE METASTATIC TO BONE (H): Primary | ICD-10-CM

## 2023-06-02 RX ORDER — LORAZEPAM 0.5 MG/1
.5-1 TABLET ORAL EVERY 6 HOURS PRN
Status: CANCELLED
Start: 2024-01-09

## 2023-06-02 RX ORDER — ALBUTEROL SULFATE 0.83 MG/ML
2.5 SOLUTION RESPIRATORY (INHALATION)
Status: CANCELLED | OUTPATIENT
Start: 2024-01-09

## 2023-06-02 RX ORDER — EPINEPHRINE 1 MG/ML
0.3 INJECTION, SOLUTION, CONCENTRATE INTRAVENOUS EVERY 5 MIN PRN
Status: CANCELLED | OUTPATIENT
Start: 2023-11-28

## 2023-06-02 RX ORDER — LORAZEPAM 2 MG/ML
.5-1 INJECTION INTRAMUSCULAR EVERY 6 HOURS PRN
Status: CANCELLED | OUTPATIENT
Start: 2023-11-28

## 2023-06-02 RX ORDER — EPINEPHRINE 1 MG/ML
0.3 INJECTION, SOLUTION, CONCENTRATE INTRAVENOUS EVERY 5 MIN PRN
Status: CANCELLED | OUTPATIENT
Start: 2024-01-09

## 2023-06-02 RX ORDER — ONDANSETRON 4 MG/1
8 TABLET, FILM COATED ORAL
Status: CANCELLED | OUTPATIENT
Start: 2024-01-09

## 2023-06-02 RX ORDER — LORAZEPAM 2 MG/ML
.5-1 INJECTION INTRAMUSCULAR EVERY 6 HOURS PRN
Status: CANCELLED | OUTPATIENT
Start: 2023-10-17

## 2023-06-02 RX ORDER — ALBUTEROL SULFATE 0.83 MG/ML
2.5 SOLUTION RESPIRATORY (INHALATION)
Status: CANCELLED | OUTPATIENT
Start: 2023-10-17

## 2023-06-02 RX ORDER — DIPHENHYDRAMINE HYDROCHLORIDE 50 MG/ML
50 INJECTION INTRAMUSCULAR; INTRAVENOUS
Status: CANCELLED
Start: 2023-10-17

## 2023-06-02 RX ORDER — ALBUTEROL SULFATE 90 UG/1
1-2 AEROSOL, METERED RESPIRATORY (INHALATION)
Status: CANCELLED
Start: 2023-10-17

## 2023-06-02 RX ORDER — DIPHENHYDRAMINE HYDROCHLORIDE 50 MG/ML
50 INJECTION INTRAMUSCULAR; INTRAVENOUS
Status: CANCELLED
Start: 2023-11-28

## 2023-06-02 RX ORDER — LORAZEPAM 0.5 MG/1
.5-1 TABLET ORAL EVERY 6 HOURS PRN
Status: CANCELLED
Start: 2023-10-17

## 2023-06-02 RX ORDER — PROCHLORPERAZINE MALEATE 5 MG
10 TABLET ORAL EVERY 6 HOURS PRN
Status: CANCELLED
Start: 2023-10-17

## 2023-06-02 RX ORDER — MEPERIDINE HYDROCHLORIDE 25 MG/ML
25 INJECTION INTRAMUSCULAR; INTRAVENOUS; SUBCUTANEOUS EVERY 30 MIN PRN
Status: CANCELLED | OUTPATIENT
Start: 2023-11-28

## 2023-06-02 RX ORDER — EPINEPHRINE 1 MG/ML
0.3 INJECTION, SOLUTION, CONCENTRATE INTRAVENOUS EVERY 5 MIN PRN
Status: CANCELLED | OUTPATIENT
Start: 2023-10-17

## 2023-06-02 RX ORDER — LORAZEPAM 2 MG/ML
.5-1 INJECTION INTRAMUSCULAR EVERY 6 HOURS PRN
Status: CANCELLED | OUTPATIENT
Start: 2024-01-09

## 2023-06-02 RX ORDER — ALBUTEROL SULFATE 90 UG/1
1-2 AEROSOL, METERED RESPIRATORY (INHALATION)
Status: CANCELLED
Start: 2024-01-09

## 2023-06-02 RX ORDER — MEPERIDINE HYDROCHLORIDE 25 MG/ML
25 INJECTION INTRAMUSCULAR; INTRAVENOUS; SUBCUTANEOUS EVERY 30 MIN PRN
Status: CANCELLED | OUTPATIENT
Start: 2023-10-17

## 2023-06-02 RX ORDER — LORAZEPAM 0.5 MG/1
.5-1 TABLET ORAL EVERY 6 HOURS PRN
Status: CANCELLED
Start: 2023-11-28

## 2023-06-02 RX ORDER — DIPHENHYDRAMINE HYDROCHLORIDE 50 MG/ML
50 INJECTION INTRAMUSCULAR; INTRAVENOUS
Status: CANCELLED
Start: 2024-01-09

## 2023-06-02 RX ORDER — MEPERIDINE HYDROCHLORIDE 25 MG/ML
25 INJECTION INTRAMUSCULAR; INTRAVENOUS; SUBCUTANEOUS EVERY 30 MIN PRN
Status: CANCELLED | OUTPATIENT
Start: 2024-01-09

## 2023-06-02 RX ORDER — PROCHLORPERAZINE MALEATE 5 MG
10 TABLET ORAL EVERY 6 HOURS PRN
Status: CANCELLED
Start: 2024-01-09

## 2023-06-02 RX ORDER — PROCHLORPERAZINE MALEATE 5 MG
10 TABLET ORAL EVERY 6 HOURS PRN
Status: CANCELLED
Start: 2023-11-28

## 2023-06-02 RX ORDER — ALBUTEROL SULFATE 90 UG/1
1-2 AEROSOL, METERED RESPIRATORY (INHALATION)
Status: CANCELLED
Start: 2023-11-28

## 2023-06-02 RX ORDER — ALBUTEROL SULFATE 0.83 MG/ML
2.5 SOLUTION RESPIRATORY (INHALATION)
Status: CANCELLED | OUTPATIENT
Start: 2023-11-28

## 2023-06-02 RX ORDER — ONDANSETRON 4 MG/1
8 TABLET, FILM COATED ORAL
Status: CANCELLED | OUTPATIENT
Start: 2023-10-17

## 2023-06-02 RX ORDER — ONDANSETRON 4 MG/1
8 TABLET, FILM COATED ORAL
Status: CANCELLED | OUTPATIENT
Start: 2023-11-28

## 2023-06-06 ENCOUNTER — TELEPHONE (OUTPATIENT)
Dept: NUCLEAR MEDICINE | Facility: CLINIC | Age: 63
End: 2023-06-06
Payer: COMMERCIAL

## 2023-06-07 ENCOUNTER — LAB (OUTPATIENT)
Dept: INFUSION THERAPY | Facility: CLINIC | Age: 63
End: 2023-06-07
Attending: INTERNAL MEDICINE
Payer: COMMERCIAL

## 2023-06-07 DIAGNOSIS — C79.51 MALIGNANT NEOPLASM OF PROSTATE METASTATIC TO BONE (H): ICD-10-CM

## 2023-06-07 DIAGNOSIS — C61 MALIGNANT NEOPLASM OF PROSTATE METASTATIC TO BONE (H): ICD-10-CM

## 2023-06-07 LAB
ALBUMIN SERPL BCG-MCNC: 4.3 G/DL (ref 3.5–5.2)
ALP SERPL-CCNC: 95 U/L (ref 40–129)
ALT SERPL W P-5'-P-CCNC: 25 U/L (ref 10–50)
ANION GAP SERPL CALCULATED.3IONS-SCNC: 11 MMOL/L (ref 7–15)
AST SERPL W P-5'-P-CCNC: 23 U/L (ref 10–50)
BASOPHILS # BLD AUTO: 0 10E3/UL (ref 0–0.2)
BASOPHILS NFR BLD AUTO: 1 %
BILIRUB SERPL-MCNC: 0.2 MG/DL
BUN SERPL-MCNC: 21.3 MG/DL (ref 8–23)
CALCIUM SERPL-MCNC: 9.2 MG/DL (ref 8.8–10.2)
CHLORIDE SERPL-SCNC: 101 MMOL/L (ref 98–107)
CREAT SERPL-MCNC: 1.1 MG/DL (ref 0.67–1.17)
DEPRECATED HCO3 PLAS-SCNC: 25 MMOL/L (ref 22–29)
EOSINOPHIL # BLD AUTO: 0.1 10E3/UL (ref 0–0.7)
EOSINOPHIL NFR BLD AUTO: 2 %
ERYTHROCYTE [DISTWIDTH] IN BLOOD BY AUTOMATED COUNT: 15 % (ref 10–15)
GFR SERPL CREATININE-BSD FRML MDRD: 76 ML/MIN/1.73M2
GLUCOSE SERPL-MCNC: 98 MG/DL (ref 70–99)
HCT VFR BLD AUTO: 39.2 % (ref 40–53)
HGB BLD-MCNC: 12.4 G/DL (ref 13.3–17.7)
IMM GRANULOCYTES # BLD: 0 10E3/UL
IMM GRANULOCYTES NFR BLD: 0 %
LYMPHOCYTES # BLD AUTO: 1.8 10E3/UL (ref 0.8–5.3)
LYMPHOCYTES NFR BLD AUTO: 32 %
MCH RBC QN AUTO: 29.1 PG (ref 26.5–33)
MCHC RBC AUTO-ENTMCNC: 31.6 G/DL (ref 31.5–36.5)
MCV RBC AUTO: 92 FL (ref 78–100)
MONOCYTES # BLD AUTO: 0.4 10E3/UL (ref 0–1.3)
MONOCYTES NFR BLD AUTO: 8 %
NEUTROPHILS # BLD AUTO: 3.3 10E3/UL (ref 1.6–8.3)
NEUTROPHILS NFR BLD AUTO: 57 %
NRBC # BLD AUTO: 0 10E3/UL
NRBC BLD AUTO-RTO: 0 /100
PLATELET # BLD AUTO: 245 10E3/UL (ref 150–450)
POTASSIUM SERPL-SCNC: 4.2 MMOL/L (ref 3.4–5.3)
PROT SERPL-MCNC: 6.9 G/DL (ref 6.4–8.3)
PSA SERPL DL<=0.01 NG/ML-MCNC: 1.42 NG/ML (ref 0–4.5)
RBC # BLD AUTO: 4.26 10E6/UL (ref 4.4–5.9)
SODIUM SERPL-SCNC: 137 MMOL/L (ref 136–145)
WBC # BLD AUTO: 5.7 10E3/UL (ref 4–11)

## 2023-06-07 PROCEDURE — 250N000011 HC RX IP 250 OP 636: Performed by: INTERNAL MEDICINE

## 2023-06-07 PROCEDURE — 84403 ASSAY OF TOTAL TESTOSTERONE: CPT | Performed by: INTERNAL MEDICINE

## 2023-06-07 PROCEDURE — 84153 ASSAY OF PSA TOTAL: CPT | Performed by: INTERNAL MEDICINE

## 2023-06-07 PROCEDURE — 36415 COLL VENOUS BLD VENIPUNCTURE: CPT

## 2023-06-07 PROCEDURE — 80053 COMPREHEN METABOLIC PANEL: CPT | Performed by: INTERNAL MEDICINE

## 2023-06-07 PROCEDURE — 85025 COMPLETE CBC W/AUTO DIFF WBC: CPT | Performed by: INTERNAL MEDICINE

## 2023-06-07 RX ORDER — HEPARIN SODIUM (PORCINE) LOCK FLUSH IV SOLN 100 UNIT/ML 100 UNIT/ML
5 SOLUTION INTRAVENOUS EVERY 8 HOURS
Status: DISCONTINUED | OUTPATIENT
Start: 2023-06-07 | End: 2023-06-07 | Stop reason: HOSPADM

## 2023-06-07 RX ADMIN — Medication 5 ML: at 08:52

## 2023-06-07 NOTE — PROGRESS NOTES
Nursing Note:  Walter Reyes presents today for port draw.    Patient seen by provider today: No   present during visit today: Not Applicable.    Note: N/A.    Intravenous Access:  Lab draw site port, Needle type port, Gauge 20 3/4in.  Labs drawn without difficulty.  Implanted Port.    Discharge Plan:   Patient was discharged home.    CAPRICE MACKENZIE RN

## 2023-06-08 NOTE — PROGRESS NOTES
"Virtual Visit Details    Type of service:  Video Visit     Originating Location (pt. Location): Home  Distant Location (provider location):  On-site  Platform used for Video Visit: [facility] Oncology Followup  Oncologist: Dr. Jj Winters  Jun 9, 2023    REASON FOR VISIT: Follow-up for metastatic castration-resistant prostate cancer    HISTORY OF PRESENT ILLNESS: Mr. 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 His oncologic history is detailed below.     1/6/21  PSA = 0.29  3/31/21 PSA = 0.44  7/7/21  PSA = 0.47  11/2021 PSA = 1.05  -- Start XTANDI  12/16/21 PSA =0.22  2/11/22 PSA= 0.50  3/22/22 PSA=0.72  5/5/2022 PSA=1.79  7/11/2022 PSA=2.16 --Start cycle 1 docetaxel   8/22/22 PSA = 1.36  9/12/22 PSA = 1.09  10/24/22 Cycle #6 of Docetaxel. End of treatment  1/9/23  PSA =0.66  3/20/23  PSA=1.54  4/21/23 PSA = 1.81  T=15  06/07/23          PSA = 1.42    Interval History:.     ONCOLOGIC HISTORY:  1. 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. Prior PSA were 1.4 on 8/16/17, 2.4 on 6/28/16, 2.9 on 6/28/16, and as low as 0.4 on 3/26/2003.   - 1/08/2019: Consultation with Sarah Wilson CNP in Urology clinic. Repeat PSA 9.6.  - 1/16/2019: MRI prostate with contrast - \"This examination is characterized as PIRADS 5- very high probability. Clinically significant cancer is highly likely to be present. There is a large, invasive mass arising from the right peripheral zone and extending into the neurovascular bundle, seminal vesicle, and along the anterolateral right mesorectal fascia. Metastatic right external iliac lymph node. Metastatic lesion in the " "posterior/superior right acetabulum.\"  - 1/25/2019: CT abdomen and pelvis with IV contrast - \"1. Heterogeneous enhancing mass posteriorly in the upper pole of the right kidney measures 4.5 x 5.8 x 5.7 cm (AP by transverse by craniocaudal). It has a small nodular component extending posterior medially which abuts the right psoas muscle. This nodular extension measures 2.1 x 2.1 cm. Minimal stranding about the mass. No definite thrombus within the right renal vein. This renal mass is compatible with a renal cell carcinoma. A paraaortic lymph node situated immediately posterior to the left renal vein measures 1.1 cm in short axis, suspicious for metastasis. 2. A 2 cm right external iliac lymph node is also suspicious for metastases. 3. Multiple sclerotic osseous lesions suspicious for metastases. These include 0.8 and 0.6 cm sclerotic lesions laterally in the right iliac wing (images 53 and 62 respectively). Ill-defined groundglass density laterally in the right acetabulum measuring 1.7 x 1.2 cm corresponds with the lesion identified on MRI. A 0.4 cm groundglass density in the left acetabulum. Sclerotic lesion in the left femoral neck measures 0.9 x 1.6 cm (image 81). Sclerotic metastases would be more compatible with prostate metastases. 4. A 3 subcentimeter hepatic lesions are indeterminate. Metastases would be a consideration. These can be further characterized with liver MRI.\"  - 1/25/2019: NM bone scan - \"There is focal bony uptake in the left femoral neck, right acetabulum, right of midline at the S1 or L5 level of the spine, within multiple bilateral ribs, in the C7 or T1 level of the spine, and anteriorly within the skull. Findings are suspicious for metastases.\"  - 1/31/19: CT chest with contrast - \" No suspicious nodules in the chest.  Stable appearance of a 5.8 cm right renal mass concerning for renal carcinoma until proven otherwise.  Stable indeterminant subcentimeter hypodensities in the liver.  " "Multifocal osteoblastic metastasis including 2.5 cm lesion in the right fifth rib posteriorly and a 1.6 cm lesion in the right third rib posteriorly. No lytic lesions identified.\"  - 2/6/19: CT-guided right sclerotic fifth rib lesion biopsy - \"Metastatic carcinoma, consistent with prostate primary.  Immunohistochemical stains performed show the metastatic carcinoma stains positive for NKX3.1 (prostate marker), negative for STACIE 3 and PAX8, which supports the above diagnosis.\"  - 2/15/19: Started Casodex 50mg every day and consented for the biobanking protocol. 3/18/19 - stopped Casodex.  - 2/19/19: Case discussed in tumor board - recommendation for nephectomy for suspected malignant right renal mass.   - 2/26/19: Started Lupron 22.5mg every 3 months.   - 5/8/19: Started Docetaxel 75mg/m2 IV every 3 weeks. 06/18/19 - cycle 3, 7/10/19 - cycle 4, 07/31/19 - cycle 5, 08/21/19 - cycle 6.   - 10/2/19: Restaging CT CAP and NM Bone Scan showed improved osseous disease, no evidence of recurrent or new metastatic disease.  - 1/5/20: Restaging CT CAP and NM Bone Scan showed stable osseous disease, no evidence of recurrent or new metastatic disease.  - 4/6/20: NM bone scan with slightly improved uptake in known skeletal mets. CT C/A/P with contrast with stable osseous mets, no yusuf enlargement, no new visceral mets etc.  - 04/08/20: Zometa every 3 months.  - 10/5/20: CT C/A/P with contrast and NM bone scan - SD.   - 1/4/21: CT C/A/P with contrast and NM bone scan - SD but slight increase in right 5th rib tracer uptake and in posterior L5 sclerosis.   - 03/29/21: CT C/A/P with contrast - single new right sacral 8mm bone lesion (suspicious), other bone mets stable. No visceral/yusuf disease. Nephrectomy site without recurrence. NM bone scan - SD but \"increased uptake associated with the prior lesions of the lower  cervical spine, posterior right fourth rib and right L5 posterior arch elements. Otherwise unchanged uptake " "associated with the proximal left femur and left anterior fourth rib. Similar uptake of the left halux, possibly secondary to degenerative osteoarthritis or gout.\"      Radiation history:   C7         3,000 cGy       06 X      8/05/2021        8/18/2021        13       10  2 Sacrum          2,500 cGy       18 X      4/12/2022        4/18/2022         6               Sacrum retreat               700 cGy        18 X      2/28/2023        2/28/2023    2. Stage III ([id number]), grade 3 of 4, clear-cell RCC of right kidney:  - Incidentally diagnosed as above.   - Underwent curative-intent robotic right radical nephrectomy with Dr. Wesley Cleveland on 3/19/19. No tumor spillage per op report.   - Path showed: \"Histologic Type: Clear cell renal cell carcinoma; Sarcomatoid Features: Not identified; Histologic Grade: Nucleolar grade 3 (WHO/ISUP). Extent Tumor Size: 4.3 cm. Microscopic Tumor Extension: Tumor extension into renal sinus (in vascular structures). Margins: Negative. Tumor Necrosis: Present; focal. Lymph-Vascular Invasion: Not identified.  Pathologic Staging (pTNM) Primary Tumor (pT): pT3a: Tumor extends into renal vein branches/renal sinus. Regional Lymph Nodes (pN): pNX. Number of Lymph Nodes Examined: 0 Distant Metastasis (pM): pM N/A.\"  - Restaging scans as above.    INTERVAL HISTORY:   Walter is here for a pre-pluvicto appointment. Endorses pain radiating from low back, down his left leg, and down to the left foot. If he lays flat or in a wrong position he will notice tingling down the entire leg. This is very similar to what he has experienced in the last related to his left sacrum metastasis. He was given a steroid course and did notice some improvement but now the pain has started to ramp back up. He is using tramadol at bedtime and tylenol. If he doesn't stay on top of his pain medications it will peak up to a 6-7/10 especially at bedtime despite tylenol 1,000 mg scheduled through out the day and tramadol in " the evenings. He has been taking tramadol around 10 pm and 1,000 tylenol around 6 or 7 pm typically. Last night, he tried taking tramadol around 7 pm and tylenol after that. He will require occasional tramadol overnight. He felt the 7 pm tramadol did help control his pain better last night. He has no loss of bladder or bowel control. No diarrhea or constipation. Endorses urinary urgency. No falls. No weakness.     His left leg pain/radiculopathy completed resolved with one fraction of radiation. No new complaints.     PAST MEDICAL HISTORY:  Past Medical History:   Diagnosis Date     Cancer of kidney, right (H)      Complication of anesthesia     slow wakeup      Malignant neoplasm of prostate metastatic to bone (H) 2/14/2019     Thyroid disease      PAST SURGICAL HISTORY:   Past Surgical History:   Procedure Laterality Date     CYSTOSCOPY      about 10 years ago     INSERT PORT VASCULAR ACCESS Right 5/2/2019    Procedure: Chest Port Placement;  Surgeon: Tate Burciaga PA-C;  Location: [facility] OR     IR CHEST PORT PLACEMENT > 5 YRS OF AGE  5/2/2019     LAPAROSCOPIC NEPHRECTOMY Right 3/19/2019    Procedure: Right laparoscopic radical nephrectomy;  Surgeon: Wesley Cleveland MD;  Location:  OR      SOCIAL HISTORY:   Social History     Tobacco Use     Smoking status: Never     Smokeless tobacco: Never   Substance Use Topics     Drug use: No     FAMILY HISTORY:   Family History   Problem Relation Age of Onset     Diabetes Father      ALLERGIES:   Allergies   Allergen Reactions     Amlodipine Other (See Comments)     Leg swelling     CURRENT MEDICATIONS:   Current Outpatient Medications:      atorvastatin (LIPITOR) 20 MG tablet, Take 60 mg by mouth daily , Disp: , Rfl:      calcium citrate-vitamin D (CITRACAL) 315-250 MG-UNIT TABS per tablet, Take 650 mg by mouth 2 times daily , Disp: , Rfl:      cycloSPORINE (RESTASIS) 0.05 % ophthalmic emulsion, Place 1 drop into both eyes 2 times daily , Disp: , Rfl:       dexamethasone (DECADRON) 1 MG tablet, Take 0.5 tablets (0.5 mg) by mouth 2 times daily (with meals) for 14 days, Disp: 14 tablet, Rfl: 0     fluconazole (DIFLUCAN) 200 MG tablet, Take 1 tablet (200 mg) by mouth daily, Disp: 60 tablet, Rfl: 1     Glucosamine-Chondroit-Vit C-Mn (GLUCOSAMINE 1500 COMPLEX) CAPS, Take 1 capsule by mouth daily, Disp: , Rfl:      levothyroxine (SYNTHROID/LEVOTHROID) 112 MCG tablet, Take 112 mcg by mouth daily, Disp: , Rfl:      loratadine (CLARITIN) 10 MG tablet, , Disp: , Rfl:      MAGNESIUM PO, Take 250 mg by mouth 2 times daily  (Patient not taking: Reported on 5/3/2023), Disp: , Rfl:      MEBENDAZOLE PO, Take 225 mg by mouth daily , Disp: , Rfl:      metFORMIN (GLUCOPHAGE XR) 500 MG 24 hr tablet, Take 1 tablet with breakfast, 2 tablets with dinner., Disp: , Rfl:      Multiple Vitamins-Minerals (MULTIVITAMIN ADULT EXTRA C PO), Take 1 tablet by mouth every 24 hours, Disp: , Rfl:      Nutritional Supplements (SALMON OIL) CAPS, Take 2 capsules by mouth daily , Disp: , Rfl:      omeprazole (PRILOSEC) 20 MG DR capsule, Take 20 mg by mouth daily, Disp: , Rfl:      tamsulosin (FLOMAX) 0.4 MG capsule, Take 1 capsule (0.4 mg) by mouth daily, Disp: 30 capsule, Rfl: 3     triamterene-HCTZ (MAXZIDE-25) 37.5-25 MG tablet, Take 1 tablet by mouth daily, Disp: , Rfl:     Physical Exam:   Video physical exam  General: Patient appears well in no acute distress.   Skin: No visualized rash or lesions on visualized skin  Eyes: EOMI, no erythema, sclera icterus or discharge noted  Resp: Appears to be breathing comfortably without accessory muscle usage, speaking in full sentences, no cough  MSK: Appears to have normal range of motion based on visualized movements  Neurologic: No apparent tremors, facial movements symmetric  Psych: affect bright, alert and oriented      ECOG PS 1.    LABORATORY DATA:  Most Recent 3 CBC's:  Recent Labs   Lab Test 06/07/23  0852 04/21/23  0810 03/20/23  0952   WBC 5.7 5.3 4.9    HGB 12.4* 12.0* 12.9*   MCV 92 89 86    199 200   ANEUTAUTO 3.3 3.1 2.8     Most Recent 3 BMP's:  Recent Labs   Lab Test 06/07/23  0852 04/21/23  0810 03/20/23  0952    140 142   POTASSIUM 4.2 4.0 4.7   CHLORIDE 101 105 104   CO2 25 24 25   BUN 21.3 16.9 14.4   CR 1.10 1.04 1.07   ANIONGAP 11 11 13   BETHANY 9.2 9.2 9.8   GLC 98 104* 110*   PROTTOTAL 6.9 6.7 7.3   ALBUMIN 4.3 4.1 4.4    Most Recent 3 LFT's:  Recent Labs   Lab Test 06/07/23  0852 04/21/23  0810 03/20/23  0952   AST 23 19 24   ALT 25 19 19   ALKPHOS 95 67 58   BILITOTAL 0.2 0.2 0.4    Most Recent 2 TSH and T4:  Recent Labs   Lab Test 07/07/21  0834 03/31/21  0824   TSH 1.67 1.63       I reviewed the above labs today.    Component      Latest Ref Rng 1/9/2023  7:36 AM 2/6/2023  9:02 AM 3/20/2023  9:52 AM 4/21/2023  8:10 AM   PSA Tumor Marker      0.00 - 4.50 ng/mL 0.66  0.86  1.54  1.81      Component      Latest Ref Rng 6/7/2023  8:52 AM   PSA Tumor Marker      0.00 - 4.50 ng/mL 1.42        Component      Latest Ref Rng 1/9/2023  7:36 AM 2/6/2023  9:02 AM 3/20/2023  9:52 AM 4/21/2023  8:10 AM   Testosterone Total      240 - 950 ng/dL 6 (L)  6 (L)  6 (L)  15 (L)      Component      Latest Ref Rng 6/7/2023  8:52 AM   Testosterone Total      240 - 950 ng/dL <2 (L)        12/6/22 repeat MRI  IMPRESSION:   1. Decreased enhancement within the C7 metastasis with decreased  epidural enhancement, consistent with response to therapy.  2. Radiation changes within the adjacent vertebrae.  3. No new metastases.  4. Mild degenerative change.  5. Small mass within the left deep lobe of the left parotid gland with  fluid-fluid level which may represent primary parotid tumor.  6. Slight fullness within the left vallecula which presumably  represents submucosal cyst. Direct visualization could be considered.    MR lumbosacral imaging performed earlier today, final results pending. I did discuss preliminary results with radiology who notes re demonstration of  "known bone metastasis with new edema of the lower posterior spinous musculature and glutaeus musculature which may be treatment related after radiation or nerve injuries. No high grade narrowing in the lumbar sacral region. There is an epidural tumor at that level of L5 traversing the nerve root without high grade stenosis.      ASSESSMENT & PLAN: Mr. Reyes is a delightful 63 year old gentleman with metastatic castration sensitive prostate adenocarcinoma as well as an incidentally diagnosed stage III clear-cell renal cell carcinoma of the right kidney (s/p radical R nephrectomy 3/19/19), who is here for a follow-up visit and initiation of docetafor now metastatic castration-resistant prostate cancer.     1. Metastatic castration-resistant prostate cancer, with involvement of the bones and RPLN:   - Patient met the criteria for CHAARTED \"high-volume\" metastatic hormone-sensitive prostate cancer. He opted for docetaxel in combination with ADT (per JOSEFA, [id number], STAMPEDE).     Walter has now been off chemotherapy since October 2022.  Last PSA was a decline compared to the previous.  PSMA PET scan Nov 2022 reveals persistent yusuf and bony disease that is evident as well as evidence of a lesion in C7. MRI C spine showed stability. Difficult to interpret PSMA avid scans in the context of a declining PSA in the case of a radiated site but did get additional radiation to sacrum with relief of symptoms.   --PSA up trending, now down slightly at 1.40 (previously 1.80). Clinically, patient is having more pain. Therefore, will proceed with pluvicto cycle 1 as planned on Tuesday 06/13/23. Discussed plan with Dr. Winters.   - Vania due next August 2023.      2. Bone metastases:   - Noted to have osseous metastatic disease at the time of diagnosis. S/p radiation to C spine and sacrum (Re-irradiation to sacrum).   - worsening radiculopathy down starting in the low back and radiating down the left leg. Final MRI " read pending as above. On preliminary read there is edema and possible nerve impingement from L5 metastasis. No high grade stenosis.   - Okay to continue tramadol, tylenol, and finish corticosteroid course. Will also add in gabapentin starting low at 100 mg TID with option to taper upwards. Previously had similar pain that had initially resolved with radiation.    - counseled patient on s/s of cord compression and when to call back/present to the ER.   - palliative care consult placed.   - Encouraged to continue calcium and vitamin D supplementation.  - Will continue Zometa 4mg IV every ~3 months. Due next in August 2023.        3. Stage 3, UISS-high risk ccRCC: s/p R nephrectomy   - approximately 40-50% risk of recurrence after definitive surgery.   - no clear evidence of residual disease at this time; the retroperitoneal lymphadenopathy is more consistent with metastatic prostate cancer   - Continue active surveillance with self-reporting for signs/symptoms of recurrence (this was previously explained in detail) and periodic H&P and scans.    40 minutes spent on the date of the encounter doing chart review, review of test results, patient visit, documentation, discussion with other provider(s) and discussion with family     Yamilet Espinoza PA-C

## 2023-06-09 ENCOUNTER — HOSPITAL ENCOUNTER (OUTPATIENT)
Dept: MRI IMAGING | Facility: CLINIC | Age: 63
Discharge: HOME | End: 2023-06-09
Attending: NURSE PRACTITIONER | Admitting: NURSE PRACTITIONER
Payer: COMMERCIAL

## 2023-06-09 ENCOUNTER — VIRTUAL VISIT (OUTPATIENT)
Dept: ONCOLOGY | Facility: CLINIC | Age: 63
End: 2023-06-09
Attending: INTERNAL MEDICINE
Payer: COMMERCIAL

## 2023-06-09 DIAGNOSIS — C79.51 PAIN FROM BONE METASTASES (H): ICD-10-CM

## 2023-06-09 DIAGNOSIS — M54.10 RADICULOPATHY, UNSPECIFIED SPINAL REGION: Primary | ICD-10-CM

## 2023-06-09 DIAGNOSIS — G89.3 PAIN FROM BONE METASTASES (H): ICD-10-CM

## 2023-06-09 DIAGNOSIS — C61 PROSTATE CANCER (H): ICD-10-CM

## 2023-06-09 DIAGNOSIS — C61 MALIGNANT NEOPLASM OF PROSTATE METASTATIC TO BONE (H): ICD-10-CM

## 2023-06-09 DIAGNOSIS — C79.51 MALIGNANT NEOPLASM OF PROSTATE METASTATIC TO BONE (H): ICD-10-CM

## 2023-06-09 LAB — TESTOST SERPL-MCNC: <2 NG/DL (ref 240–950)

## 2023-06-09 PROCEDURE — 72158 MRI LUMBAR SPINE W/O & W/DYE: CPT

## 2023-06-09 PROCEDURE — 255N000002 HC RX 255 OP 636: Performed by: NURSE PRACTITIONER

## 2023-06-09 PROCEDURE — 99215 OFFICE O/P EST HI 40 MIN: CPT | Mod: VID

## 2023-06-09 PROCEDURE — A9585 GADOBUTROL INJECTION: HCPCS | Performed by: NURSE PRACTITIONER

## 2023-06-09 RX ORDER — GADOBUTROL 604.72 MG/ML
12 INJECTION INTRAVENOUS ONCE
Status: COMPLETED | OUTPATIENT
Start: 2023-06-09 | End: 2023-06-09

## 2023-06-09 RX ORDER — GABAPENTIN 100 MG/1
100 CAPSULE ORAL 3 TIMES DAILY
Qty: 90 CAPSULE | Refills: 0 | Status: SHIPPED | OUTPATIENT
Start: 2023-06-09 | End: 2023-07-05

## 2023-06-09 RX ORDER — TRAMADOL HYDROCHLORIDE 50 MG/1
50 TABLET ORAL EVERY 6 HOURS PRN
Qty: 30 TABLET | Refills: 0 | Status: SHIPPED | OUTPATIENT
Start: 2023-06-09 | End: 2023-07-05 | Stop reason: ALTCHOICE

## 2023-06-09 RX ADMIN — GADOBUTROL 12 ML: 604.72 INJECTION INTRAVENOUS at 07:51

## 2023-06-09 NOTE — Clinical Note
"    6/9/2023         RE: Walter Reyes  [address] [facility] [address]        Dear Colleague,    Thank you for referring your patient, Walter Reyes, to the [facility]. Please see a copy of my visit note below.    Virtual Visit Details    Type of service:  Video Visit     Originating Location (pt. Location): {video visit patient location:984872::\"Home\"}  {PROVIDER LOCATION On-site should be selected for visits conducted from your clinic location or adjoining [facility] hospital, academic office, or other nearby [facility] building. Off-site should be selected for all other provider locations, including home:313630}  Distant Location (provider location):  {virtual location provider:703654}  Platform used for Video Visit: {Virtual Visit Platforms:339120::\"AmWell\"}     Virtual Visit Details    Type of service:  Video Visit     Originating Location (pt. Location): Home  {PROVIDER LOCATION On-site should be selected for visits conducted from your clinic location or adjoining [facility] hospital, [facility] office, or other nearby [facility] building. Off-site should be selected for all other provider locations, including home:161890}  Distant Location (provider location):  On-site  Platform used for Video Visit: [facility] Oncology Followup  Oncologist: Dr. Jj Winters  Jun 9, 2023    REASON FOR VISIT: Follow-up for metastatic castration-resistant prostate cancer    HISTORY OF PRESENT ILLNESS: Mr. 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 His oncologic history is detailed below.     1/6/21  PSA = 0.29  3/31/21 PSA = 0.44  7/7/21  PSA = 0.47  11/2021 PSA = 1.05  -- Start XTANDI  12/16/21 PSA =0.22  2/11/22 PSA= 0.50  3/22/22 PSA=0.72  5/5/2022 PSA=1.79  7/11/2022 PSA=2.16 --Start cycle 1 docetaxel   8/22/22 PSA = " "1.36  9/12/22 PSA = 1.09  10/24/22 Cycle #6 of Docetaxel. End of treatment  1/9/23  PSA =0.66  3/20/23  PSA=1.54  4/21/23 PSA = 1.81  T=15  Interval History:.     ONCOLOGIC HISTORY:  1. 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 Naylor at [facility]. Prior PSA were 1.4 on 8/16/17, 2.4 on 6/28/16, 2.9 on 6/28/16, and as low as 0.4 on 3/26/2003.   - 1/08/2019: Consultation with Sarah Wilson CNP in Urology clinic. Repeat PSA 9.6.  - 1/16/2019: MRI prostate with contrast - \"This examination is characterized as PIRADS 5- very high probability. Clinically significant cancer is highly likely to be present. There is a large, invasive mass arising from the right peripheral zone and extending into the neurovascular bundle, seminal vesicle, and along the anterolateral right mesorectal fascia. Metastatic right external iliac lymph node. Metastatic lesion in the posterior/superior right acetabulum.\"  - 1/25/2019: CT abdomen and pelvis with IV contrast - \"1. Heterogeneous enhancing mass posteriorly in the upper pole of the right kidney measures 4.5 x 5.8 x 5.7 cm (AP by transverse by craniocaudal). It has a small nodular component extending posterior medially which abuts the right psoas muscle. This nodular extension measures 2.1 x 2.1 cm. Minimal stranding about the mass. No definite thrombus within the right renal vein. This renal mass is compatible with a renal cell carcinoma. A paraaortic lymph node situated immediately posterior to the left renal vein measures 1.1 cm in short axis, suspicious for metastasis. 2. A 2 cm right external iliac lymph node is also suspicious for metastases. 3. Multiple sclerotic osseous lesions suspicious for metastases. These include 0.8 and 0.6 cm sclerotic lesions laterally in the right iliac wing (images 53 and 62 " "respectively). Ill-defined groundglass density laterally in the right acetabulum measuring 1.7 x 1.2 cm corresponds with the lesion identified on MRI. A 0.4 cm groundglass density in the left acetabulum. Sclerotic lesion in the left femoral neck measures 0.9 x 1.6 cm (image 81). Sclerotic metastases would be more compatible with prostate metastases. 4. A 3 subcentimeter hepatic lesions are indeterminate. Metastases would be a consideration. These can be further characterized with liver MRI.\"  - 1/25/2019: NM bone scan - \"There is focal bony uptake in the left femoral neck, right acetabulum, right of midline at the S1 or L5 level of the spine, within multiple bilateral ribs, in the C7 or T1 level of the spine, and anteriorly within the skull. Findings are suspicious for metastases.\"  - 1/31/19: CT chest with contrast - \" No suspicious nodules in the chest.  Stable appearance of a 5.8 cm right renal mass concerning for renal carcinoma until proven otherwise.  Stable indeterminant subcentimeter hypodensities in the liver.  Multifocal osteoblastic metastasis including 2.5 cm lesion in the right fifth rib posteriorly and a 1.6 cm lesion in the right third rib posteriorly. No lytic lesions identified.\"  - 2/6/19: CT-guided right sclerotic fifth rib lesion biopsy - \"Metastatic carcinoma, consistent with prostate primary.  Immunohistochemical stains performed show the metastatic carcinoma stains positive for NKX3.1 (prostate marker), negative for STACIE 3 and PAX8, which supports the above diagnosis.\"  - 2/15/19: Started Casodex 50mg every day and consented for the biobanking protocol. 3/18/19 - stopped Casodex.  - 2/19/19: Case discussed in tumor board - recommendation for nephectomy for suspected malignant right renal mass.   - 2/26/19: Started Lupron 22.5mg every 3 months.   - 5/8/19: Started Docetaxel 75mg/m2 IV every 3 weeks. 06/18/19 - cycle 3, 7/10/19 - cycle 4, 07/31/19 - cycle 5, 08/21/19 - cycle 6.   - 10/2/19: " "Restaging CT CAP and NM Bone Scan showed improved osseous disease, no evidence of recurrent or new metastatic disease.  - 1/5/20: Restaging CT CAP and NM Bone Scan showed stable osseous disease, no evidence of recurrent or new metastatic disease.  - 4/6/20: NM bone scan with slightly improved uptake in known skeletal mets. CT C/A/P with contrast with stable osseous mets, no yusuf enlargement, no new visceral mets etc.  - 04/08/20: Zometa every 3 months.  - 10/5/20: CT C/A/P with contrast and NM bone scan - SD.   - 1/4/21: CT C/A/P with contrast and NM bone scan - SD but slight increase in right 5th rib tracer uptake and in posterior L5 sclerosis.   - 03/29/21: CT C/A/P with contrast - single new right sacral 8mm bone lesion (suspicious), other bone mets stable. No visceral/yusuf disease. Nephrectomy site without recurrence. NM bone scan - SD but \"increased uptake associated with the prior lesions of the lower  cervical spine, posterior right fourth rib and right L5 posterior arch elements. Otherwise unchanged uptake associated with the proximal left femur and left anterior fourth rib. Similar uptake of the left halux, possibly secondary to degenerative osteoarthritis or gout.\"      2. Stage III ([id number]), grade 3 of 4, clear-cell RCC of right kidney:  - Incidentally diagnosed as above.   - Underwent curative-intent robotic right radical nephrectomy with Dr. Wesley Cleveland on 3/19/19. No tumor spillage per op report.   - Path showed: \"Histologic Type: Clear cell renal cell carcinoma; Sarcomatoid Features: Not identified; Histologic Grade: Nucleolar grade 3 (WHO/ISUP). Extent Tumor Size: 4.3 cm. Microscopic Tumor Extension: Tumor extension into renal sinus (in vascular structures). Margins: Negative. Tumor Necrosis: Present; focal. Lymph-Vascular Invasion: Not identified.  Pathologic Staging (pTNM) Primary Tumor (pT): pT3a: Tumor extends into renal vein branches/renal sinus. Regional Lymph Nodes (pN): pNX. Number of " "Lymph Nodes Examined: 0 Distant Metastasis (pM): pM N/A.\"  - Restaging scans as above.    INTERVAL HISTORY:   Walter is here for follow up. His left leg pain/radiculopathy completed resolved with one fraction of radiation. No new complaints.     PAST MEDICAL HISTORY:  Past Medical History:   Diagnosis Date     Cancer of kidney, right (H)      Complication of anesthesia     slow wakeup      Malignant neoplasm of prostate metastatic to bone (H) 2/14/2019     Thyroid disease      PAST SURGICAL HISTORY:   Past Surgical History:   Procedure Laterality Date     CYSTOSCOPY      about 10 years ago     INSERT PORT VASCULAR ACCESS Right 5/2/2019    Procedure: Chest Port Placement;  Surgeon: Tate Burciaga PA-C;  Location: [facility] OR     IR CHEST PORT PLACEMENT > 5 YRS OF AGE  5/2/2019     LAPAROSCOPIC NEPHRECTOMY Right 3/19/2019    Procedure: Right laparoscopic radical nephrectomy;  Surgeon: Wesley Cleveland MD;  Location:  OR      SOCIAL HISTORY:   Social History     Tobacco Use     Smoking status: Never     Smokeless tobacco: Never   Substance Use Topics     Drug use: No     FAMILY HISTORY:   Family History   Problem Relation Age of Onset     Diabetes Father      ALLERGIES:   Allergies   Allergen Reactions     Amlodipine Other (See Comments)     Leg swelling     CURRENT MEDICATIONS:   Current Outpatient Medications:      atorvastatin (LIPITOR) 20 MG tablet, Take 60 mg by mouth daily , Disp: , Rfl:      calcium citrate-vitamin D (CITRACAL) 315-250 MG-UNIT TABS per tablet, Take 650 mg by mouth 2 times daily , Disp: , Rfl:      cycloSPORINE (RESTASIS) 0.05 % ophthalmic emulsion, Place 1 drop into both eyes 2 times daily , Disp: , Rfl:      dexamethasone (DECADRON) 1 MG tablet, Take 0.5 tablets (0.5 mg) by mouth 2 times daily (with meals) for 14 days, Disp: 14 tablet, Rfl: 0     fluconazole (DIFLUCAN) 200 MG tablet, Take 1 tablet (200 mg) by mouth daily, Disp: 60 tablet, Rfl: 1     Glucosamine-Chondroit-Vit C-Mn " (GLUCOSAMINE 1500 COMPLEX) CAPS, Take 1 capsule by mouth daily, Disp: , Rfl:      levothyroxine (SYNTHROID/LEVOTHROID) 112 MCG tablet, Take 112 mcg by mouth daily, Disp: , Rfl:      loratadine (CLARITIN) 10 MG tablet, , Disp: , Rfl:      MAGNESIUM PO, Take 250 mg by mouth 2 times daily  (Patient not taking: Reported on 5/3/2023), Disp: , Rfl:      MEBENDAZOLE PO, Take 225 mg by mouth daily , Disp: , Rfl:      metFORMIN (GLUCOPHAGE XR) 500 MG 24 hr tablet, Take 1 tablet with breakfast, 2 tablets with dinner., Disp: , Rfl:      Multiple Vitamins-Minerals (MULTIVITAMIN ADULT EXTRA C PO), Take 1 tablet by mouth every 24 hours, Disp: , Rfl:      Nutritional Supplements (SALMON OIL) CAPS, Take 2 capsules by mouth daily , Disp: , Rfl:      omeprazole (PRILOSEC) 20 MG DR capsule, Take 20 mg by mouth daily, Disp: , Rfl:      tamsulosin (FLOMAX) 0.4 MG capsule, Take 1 capsule (0.4 mg) by mouth daily, Disp: 30 capsule, Rfl: 3     triamterene-HCTZ (MAXZIDE-25) 37.5-25 MG tablet, Take 1 tablet by mouth daily, Disp: , Rfl:     Physical Exam:   There were no vitals taken for this visit.   General: well appearing, no acute distress, pleasant male   MSK: full range of motion in all four extremities, no peripheral edema  Neuro: alert and oriented x3, CN grossly intact       ECOG PS 1.    LABORATORY DATA:  Most Recent 3 CBC's:  Recent Labs   Lab Test 06/07/23  0852 04/21/23  0810 03/20/23  0952   WBC 5.7 5.3 4.9   HGB 12.4* 12.0* 12.9*   MCV 92 89 86    199 200   ANEUTAUTO 3.3 3.1 2.8     Most Recent 3 BMP's:  Recent Labs   Lab Test 06/07/23  0852 04/21/23  0810 03/20/23  0952    140 142   POTASSIUM 4.2 4.0 4.7   CHLORIDE 101 105 104   CO2 25 24 25   BUN 21.3 16.9 14.4   CR 1.10 1.04 1.07   ANIONGAP 11 11 13   BETHANY 9.2 9.2 9.8   GLC 98 104* 110*   PROTTOTAL 6.9 6.7 7.3   ALBUMIN 4.3 4.1 4.4    Most Recent 3 LFT's:  Recent Labs   Lab Test 06/07/23  0852 04/21/23  0810 03/20/23  0952   AST 23 19 24   ALT 25 19 19   ALKPHOS  "95 67 58   BILITOTAL 0.2 0.2 0.4    Most Recent 2 TSH and T4:  Recent Labs   Lab Test 07/07/21  0834 03/31/21  0824   TSH 1.67 1.63                           I reviewed the above labs today.     Latest Reference Range & Units 01/09/23 07:36 02/06/23 09:02 03/20/23 09:52   Glucose 70 - 99 mg/dL 112 (H) 104 (H) 110 (H)   PSA Tumor Marker 0.00 - 4.50 ng/mL 0.66 0.86 1.54   Testosterone Total 240 - 950 ng/dL 6 (L) 6 (L)    (H): Data is abnormally high  (L): Data is abnormally low    12/6/22 repeat MRI  IMPRESSION:   1. Decreased enhancement within the C7 metastasis with decreased  epidural enhancement, consistent with response to therapy.  2. Radiation changes within the adjacent vertebrae.  3. No new metastases.  4. Mild degenerative change.  5. Small mass within the left deep lobe of the left parotid gland with  fluid-fluid level which may represent primary parotid tumor.  6. Slight fullness within the left vallecula which presumably  represents submucosal cyst. Direct visualization could be considered.      ASSESSMENT & PLAN: Mr. Reyes is a delightful 63 year old gentleman with metastatic castration sensitive prostate adenocarcinoma as well as an incidentally diagnosed stage III clear-cell renal cell carcinoma of the right kidney (s/p radical R nephrectomy 3/19/19), who is here for a follow-up visit and initiation of docetaxel for now metastatic castration-resistant prostate cancer.     1. Metastatic castration-resistant prostate cancer, with involvement of the bones and RPLN:   - Patient met the criteria for CHAARTED \"high-volume\" metastatic hormone-sensitive prostate cancer. He opted for docetaxel in combination with ADT (per CHAARTED, [id number], STAMPEDE).     Walter has now been off chemotherapy for about 8 weeks.  Last PSA was a decline compared to the previous.  PSMA PET scan Nov 2022 reveals persistent yusuf and bony disease that is evident as well as evidence of a lesion in C7. MRI C spine showed " "stability. Difficult to interpret PSMA avid scans in the context of a declining PSA in the case of a radiated site but did get additional radiation to sacrum with relief of symptoms.   --PSA up trending, will confirm if beneficial to get PSMA PET now given it is approaching 2.00 or if we wait.   --RTC next month as scheduled.   --Consider moving forward with lutetium 177 as next line of treatment  --Will do PSMA PET scan in June and will likely order Pluvicto after that scan    2. Bone metastases:   - Noted to have osseous metastatic disease at the time of diagnosis. S/p radiation to C spine and sacrum (Re-irradiation to sacrum).   - Encouraged to continue calcium and vitamin D supplementation.  - Will continue Zometa 4mg IV every ~3 months, will request for May  - pain resolved with radiation for now, otherwise tramadol and tylenol has been helpful in the past     3. Stage 3, UISS-high risk ccRCC: s/p R nephrectomy   - approximately 40-50% risk of recurrence after definitive surgery.   - no clear evidence of residual disease at this time; the retroperitoneal lymphadenopathy is more consistent with metastatic prostate cancer   - Continue active surveillance with self-reporting for signs/symptoms of recurrence (this was previously explained in detail) and periodic H&P and scans.               Virtual Visit Details    Type of service:  Video Visit     Originating Location (pt. Location): {video visit patient location:068388::\"Home\"}  {PROVIDER LOCATION On-site should be selected for visits conducted from your clinic location or adjoining [facility] hospital, academic office, or other nearby [facility] building. Off-site should be selected for all other provider locations, including home:565956}  Distant Location (provider location):  {virtual location provider:448503}  Platform used for Video Visit: {Virtual Visit Platforms:480507::\"The Grommet\"}     Virtual Visit Details    Type of service:  Video Visit     Originating Location (pt. " "Location): Home  {PROVIDER LOCATION On-site should be selected for visits conducted from your clinic location or adjoining [facility] hospital, academic office, or other nearby [facility] building. Off-site should be selected for all other provider locations, including home:484680}  Distant Location (provider location):  On-site  Platform used for Video Visit: [facility] Followup  Oncologist: Dr. Jj Winters  Jun 9, 2023    REASON FOR VISIT: Follow-up for metastatic castration-resistant prostate cancer    HISTORY OF PRESENT ILLNESS: Mr. 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 His oncologic history is detailed below.     1/6/21  PSA = 0.29  3/31/21 PSA = 0.44  7/7/21  PSA = 0.47  11/2021 PSA = 1.05  -- Start XTANDI  12/16/21 PSA =0.22  2/11/22 PSA= 0.50  3/22/22 PSA=0.72  5/5/2022 PSA=1.79  7/11/2022 PSA=2.16 --Start cycle 1 docetaxel   8/22/22 PSA = 1.36  9/12/22 PSA = 1.09  10/24/22 Cycle #6 of Docetaxel. End of treatment  1/9/23  PSA =0.66  3/20/23  PSA=1.54  4/21/23 PSA = 1.81  T=15  Interval History:.     ONCOLOGIC HISTORY:  1. 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 Prior PSA were 1.4 on 8/16/17, 2.4 on 6/28/16, 2.9 on 6/28/16, and as low as 0.4 on 3/26/2003.   - 1/08/2019: Consultation with Sarah Wilson CNP in Urology clinic. Repeat PSA 9.6.  - 1/16/2019: MRI prostate with contrast - \"This examination is characterized as PIRADS 5- very high probability. Clinically significant cancer is highly likely to be present. There is a large, invasive mass arising from the right peripheral zone and extending into the neurovascular bundle, seminal " "vesicle, and along the anterolateral right mesorectal fascia. Metastatic right external iliac lymph node. Metastatic lesion in the posterior/superior right acetabulum.\"  - 1/25/2019: CT abdomen and pelvis with IV contrast - \"1. Heterogeneous enhancing mass posteriorly in the upper pole of the right kidney measures 4.5 x 5.8 x 5.7 cm (AP by transverse by craniocaudal). It has a small nodular component extending posterior medially which abuts the right psoas muscle. This nodular extension measures 2.1 x 2.1 cm. Minimal stranding about the mass. No definite thrombus within the right renal vein. This renal mass is compatible with a renal cell carcinoma. A paraaortic lymph node situated immediately posterior to the left renal vein measures 1.1 cm in short axis, suspicious for metastasis. 2. A 2 cm right external iliac lymph node is also suspicious for metastases. 3. Multiple sclerotic osseous lesions suspicious for metastases. These include 0.8 and 0.6 cm sclerotic lesions laterally in the right iliac wing (images 53 and 62 respectively). Ill-defined groundglass density laterally in the right acetabulum measuring 1.7 x 1.2 cm corresponds with the lesion identified on MRI. A 0.4 cm groundglass density in the left acetabulum. Sclerotic lesion in the left femoral neck measures 0.9 x 1.6 cm (image 81). Sclerotic metastases would be more compatible with prostate metastases. 4. A 3 subcentimeter hepatic lesions are indeterminate. Metastases would be a consideration. These can be further characterized with liver MRI.\"  - 1/25/2019: NM bone scan - \"There is focal bony uptake in the left femoral neck, right acetabulum, right of midline at the S1 or L5 level of the spine, within multiple bilateral ribs, in the C7 or T1 level of the spine, and anteriorly within the skull. Findings are suspicious for metastases.\"  - 1/31/19: CT chest with contrast - \" No suspicious nodules in the chest.  Stable appearance of a 5.8 cm right renal " "mass concerning for renal carcinoma until proven otherwise.  Stable indeterminant subcentimeter hypodensities in the liver.  Multifocal osteoblastic metastasis including 2.5 cm lesion in the right fifth rib posteriorly and a 1.6 cm lesion in the right third rib posteriorly. No lytic lesions identified.\"  - 2/6/19: CT-guided right sclerotic fifth rib lesion biopsy - \"Metastatic carcinoma, consistent with prostate primary.  Immunohistochemical stains performed show the metastatic carcinoma stains positive for NKX3.1 (prostate marker), negative for STACIE 3 and PAX8, which supports the above diagnosis.\"  - 2/15/19: Started Casodex 50mg every day and consented for the biobanking protocol. 3/18/19 - stopped Casodex.  - 2/19/19: Case discussed in tumor board - recommendation for nephectomy for suspected malignant right renal mass.   - 2/26/19: Started Lupron 22.5mg every 3 months.   - 5/8/19: Started Docetaxel 75mg/m2 IV every 3 weeks. 06/18/19 - cycle 3, 7/10/19 - cycle 4, 07/31/19 - cycle 5, 08/21/19 - cycle 6.   - 10/2/19: Restaging CT CAP and NM Bone Scan showed improved osseous disease, no evidence of recurrent or new metastatic disease.  - 1/5/20: Restaging CT CAP and NM Bone Scan showed stable osseous disease, no evidence of recurrent or new metastatic disease.  - 4/6/20: NM bone scan with slightly improved uptake in known skeletal mets. CT C/A/P with contrast with stable osseous mets, no yusuf enlargement, no new visceral mets etc.  - 04/08/20: Zometa every 3 months.  - 10/5/20: CT C/A/P with contrast and NM bone scan - SD.   - 1/4/21: CT C/A/P with contrast and NM bone scan - SD but slight increase in right 5th rib tracer uptake and in posterior L5 sclerosis.   - 03/29/21: CT C/A/P with contrast - single new right sacral 8mm bone lesion (suspicious), other bone mets stable. No visceral/yusuf disease. Nephrectomy site without recurrence. NM bone scan - SD but \"increased uptake associated with the prior lesions of " "the lower  cervical spine, posterior right fourth rib and right L5 posterior arch elements. Otherwise unchanged uptake associated with the proximal left femur and left anterior fourth rib. Similar uptake of the left halux, possibly secondary to degenerative osteoarthritis or gout.\"      2. Stage III ([id number]), grade 3 of 4, clear-cell RCC of right kidney:  - Incidentally diagnosed as above.   - Underwent curative-intent robotic right radical nephrectomy with Dr. Wesley Cleveland on 3/19/19. No tumor spillage per op report.   - Path showed: \"Histologic Type: Clear cell renal cell carcinoma; Sarcomatoid Features: Not identified; Histologic Grade: Nucleolar grade 3 (WHO/ISUP). Extent Tumor Size: 4.3 cm. Microscopic Tumor Extension: Tumor extension into renal sinus (in vascular structures). Margins: Negative. Tumor Necrosis: Present; focal. Lymph-Vascular Invasion: Not identified.  Pathologic Staging (pTNM) Primary Tumor (pT): pT3a: Tumor extends into renal vein branches/renal sinus. Regional Lymph Nodes (pN): pNX. Number of Lymph Nodes Examined: 0 Distant Metastasis (pM): pM N/A.\"  - Restaging scans as above.    INTERVAL HISTORY:   Walter is here for a pre-pluvicto appointment. Pain radiating from low back, down his left leg, and down to the foot. He was given a steroid course and did notice some improvement but now the pain has started to ramp back up. He is using tramadol at bedtime and tylenol. If he doesn't stay on top of his pain medications it will peak up to a 6-7/10 especially at bedtime despite tylenol and tramadol. He has been taking tramadol around 10 pm and 1,000 tylenol around 6 or 7 pm typically. Last night, he tried taking tramadol around 7 pm and tylenol after that. He will require occasional tramadol overnight.     His left leg pain/radiculopathy completed resolved with one fraction of radiation. No new complaints.     PAST MEDICAL HISTORY:  Past Medical History:   Diagnosis Date     Cancer of kidney, " right (H)      Complication of anesthesia     slow wakeup      Malignant neoplasm of prostate metastatic to bone (H) 2/14/2019     Thyroid disease      PAST SURGICAL HISTORY:   Past Surgical History:   Procedure Laterality Date     CYSTOSCOPY      about 10 years ago     INSERT PORT VASCULAR ACCESS Right 5/2/2019    Procedure: Chest Port Placement;  Surgeon: Tate Burciaga PA-C;  [facility]     IR CHEST PORT PLACEMENT > 5 YRS OF AGE  5/2/2019     LAPAROSCOPIC NEPHRECTOMY Right 3/19/2019    Procedure: Right laparoscopic radical nephrectomy;  Surgeon: Wesley Cleveland MD;  Location:  OR      SOCIAL HISTORY:   Social History     Tobacco Use     Smoking status: Never     Smokeless tobacco: Never   Substance Use Topics     Drug use: No     FAMILY HISTORY:   Family History   Problem Relation Age of Onset     Diabetes Father      ALLERGIES:   Allergies   Allergen Reactions     Amlodipine Other (See Comments)     Leg swelling     CURRENT MEDICATIONS:   Current Outpatient Medications:      atorvastatin (LIPITOR) 20 MG tablet, Take 60 mg by mouth daily , Disp: , Rfl:      calcium citrate-vitamin D (CITRACAL) 315-250 MG-UNIT TABS per tablet, Take 650 mg by mouth 2 times daily , Disp: , Rfl:      cycloSPORINE (RESTASIS) 0.05 % ophthalmic emulsion, Place 1 drop into both eyes 2 times daily , Disp: , Rfl:      dexamethasone (DECADRON) 1 MG tablet, Take 0.5 tablets (0.5 mg) by mouth 2 times daily (with meals) for 14 days, Disp: 14 tablet, Rfl: 0     fluconazole (DIFLUCAN) 200 MG tablet, Take 1 tablet (200 mg) by mouth daily, Disp: 60 tablet, Rfl: 1     Glucosamine-Chondroit-Vit C-Mn (GLUCOSAMINE 1500 COMPLEX) CAPS, Take 1 capsule by mouth daily, Disp: , Rfl:      levothyroxine (SYNTHROID/LEVOTHROID) 112 MCG tablet, Take 112 mcg by mouth daily, Disp: , Rfl:      loratadine (CLARITIN) 10 MG tablet, , Disp: , Rfl:      MAGNESIUM PO, Take 250 mg by mouth 2 times daily  (Patient not taking: Reported on  5/3/2023), Disp: , Rfl:      MEBENDAZOLE PO, Take 225 mg by mouth daily , Disp: , Rfl:      metFORMIN (GLUCOPHAGE XR) 500 MG 24 hr tablet, Take 1 tablet with breakfast, 2 tablets with dinner., Disp: , Rfl:      Multiple Vitamins-Minerals (MULTIVITAMIN ADULT EXTRA C PO), Take 1 tablet by mouth every 24 hours, Disp: , Rfl:      Nutritional Supplements (SALMON OIL) CAPS, Take 2 capsules by mouth daily , Disp: , Rfl:      omeprazole (PRILOSEC) 20 MG DR capsule, Take 20 mg by mouth daily, Disp: , Rfl:      tamsulosin (FLOMAX) 0.4 MG capsule, Take 1 capsule (0.4 mg) by mouth daily, Disp: 30 capsule, Rfl: 3     triamterene-HCTZ (MAXZIDE-25) 37.5-25 MG tablet, Take 1 tablet by mouth daily, Disp: , Rfl:     Physical Exam:   There were no vitals taken for this visit.   General: well appearing, no acute distress, pleasant male   MSK: full range of motion in all four extremities, no peripheral edema  Neuro: alert and oriented x3, CN grossly intact       ECOG PS 1.    LABORATORY DATA:  Most Recent 3 CBC's:  Recent Labs   Lab Test 06/07/23  0852 04/21/23  0810 03/20/23  0952   WBC 5.7 5.3 4.9   HGB 12.4* 12.0* 12.9*   MCV 92 89 86    199 200   ANEUTAUTO 3.3 3.1 2.8     Most Recent 3 BMP's:  Recent Labs   Lab Test 06/07/23  0852 04/21/23  0810 03/20/23  0952    140 142   POTASSIUM 4.2 4.0 4.7   CHLORIDE 101 105 104   CO2 25 24 25   BUN 21.3 16.9 14.4   CR 1.10 1.04 1.07   ANIONGAP 11 11 13   BETHANY 9.2 9.2 9.8   GLC 98 104* 110*   PROTTOTAL 6.9 6.7 7.3   ALBUMIN 4.3 4.1 4.4    Most Recent 3 LFT's:  Recent Labs   Lab Test 06/07/23  0852 04/21/23  0810 03/20/23  0952   AST 23 19 24   ALT 25 19 19   ALKPHOS 95 67 58   BILITOTAL 0.2 0.2 0.4    Most Recent 2 TSH and T4:  Recent Labs   Lab Test 07/07/21  0834 03/31/21  0824   TSH 1.67 1.63                           I reviewed the above labs today.     Latest Reference Range & Units 01/09/23 07:36 02/06/23 09:02 03/20/23 09:52   Glucose 70 - 99 mg/dL 112 (H) 104 (H) 110 (H)  "  PSA Tumor Marker 0.00 - 4.50 ng/mL 0.66 0.86 1.54   Testosterone Total 240 - 950 ng/dL 6 (L) 6 (L)    (H): Data is abnormally high  (L): Data is abnormally low    12/6/22 repeat MRI  IMPRESSION:   1. Decreased enhancement within the C7 metastasis with decreased  epidural enhancement, consistent with response to therapy.  2. Radiation changes within the adjacent vertebrae.  3. No new metastases.  4. Mild degenerative change.  5. Small mass within the left deep lobe of the left parotid gland with  fluid-fluid level which may represent primary parotid tumor.  6. Slight fullness within the left vallecula which presumably  represents submucosal cyst. Direct visualization could be considered.      ASSESSMENT & PLAN: Mr. Reyes is a delightful 63 year old gentleman with metastatic castration sensitive prostate adenocarcinoma as well as an incidentally diagnosed stage III clear-cell renal cell carcinoma of the right kidney (s/p radical R nephrectomy 3/19/19), who is here for a follow-up visit and initiation of docetaxel for now metastatic castration-resistant prostate cancer.     1. Metastatic castration-resistant prostate cancer, with involvement of the bones and RPLN:   - Patient met the criteria for CHAARTED \"high-volume\" metastatic hormone-sensitive prostate cancer. He opted for docetaxel in combination with ADT (per JOSEFA, [id number], KARLA).     Walter has now been off chemotherapy for about 8 weeks.  Last PSA was a decline compared to the previous.  PSMA PET scan Nov 2022 reveals persistent yusuf and bony disease that is evident as well as evidence of a lesion in C7. MRI C spine showed stability. Difficult to interpret PSMA avid scans in the context of a declining PSA in the case of a radiated site but did get additional radiation to sacrum with relief of symptoms.   --PSA up trending, will confirm if beneficial to get PSMA PET now given it is approaching 2.00 or if we wait.   --RTC next month as " scheduled.   --Consider moving forward with lutetium 177 as next line of treatment  --Will do PSMA PET scan in June and will likely order Pluvicto after that scan    2. Bone metastases:   - Noted to have osseous metastatic disease at the time of diagnosis. S/p radiation to C spine and sacrum (Re-irradiation to sacrum).   - Encouraged to continue calcium and vitamin D supplementation.  - Will continue Zometa 4mg IV every ~3 months, will request for May  - pain resolved with radiation for now, otherwise tramadol and tylenol has been helpful in the past     3. Stage 3, UISS-high risk ccRCC: s/p R nephrectomy   - approximately 40-50% risk of recurrence after definitive surgery.   - no clear evidence of residual disease at this time; the retroperitoneal lymphadenopathy is more consistent with metastatic prostate cancer   - Continue active surveillance with self-reporting for signs/symptoms of recurrence (this was previously explained in detail) and periodic H&P and scans.                 Again, thank you for allowing me to participate in the care of your patient.        Sincerely,        Yamilet Espinoza PA-C

## 2023-06-09 NOTE — NURSING NOTE
Is the patient currently in the state of [place]? YES    Visit mode:VIDEO    If the visit is dropped, the patient can be reconnected by: VIDEO VISIT: Text to cell phone: [phone]    Will anyone else be joining the visit? NO      How would you like to obtain your AVS? MyChart    Are changes needed to the allergy or medication list? NO    Reason for visit: RECHECK (Follow up)

## 2023-06-13 ENCOUNTER — PATIENT OUTREACH (OUTPATIENT)
Dept: ONCOLOGY | Facility: CLINIC | Age: 63
End: 2023-06-13

## 2023-06-13 ENCOUNTER — HOSPITAL ENCOUNTER (OUTPATIENT)
Dept: NUCLEAR MEDICINE | Facility: CLINIC | Age: 63
Setting detail: NUCLEAR MEDICINE
Discharge: HOME | End: 2023-06-13
Attending: INTERNAL MEDICINE | Admitting: INTERNAL MEDICINE
Payer: COMMERCIAL

## 2023-06-13 ENCOUNTER — ALLIED HEALTH/NURSE VISIT (OUTPATIENT)
Dept: ONCOLOGY | Facility: CLINIC | Age: 63
End: 2023-06-13

## 2023-06-13 VITALS
OXYGEN SATURATION: 97 % | TEMPERATURE: 97.8 F | DIASTOLIC BLOOD PRESSURE: 84 MMHG | SYSTOLIC BLOOD PRESSURE: 140 MMHG | HEART RATE: 49 BPM | RESPIRATION RATE: 16 BRPM

## 2023-06-13 DIAGNOSIS — C79.51 MALIGNANT NEOPLASM OF PROSTATE METASTATIC TO BONE (H): Primary | ICD-10-CM

## 2023-06-13 DIAGNOSIS — C61 MALIGNANT NEOPLASM OF PROSTATE METASTATIC TO BONE (H): Primary | ICD-10-CM

## 2023-06-13 DIAGNOSIS — C61 PROSTATE CANCER (H): Primary | ICD-10-CM

## 2023-06-13 DIAGNOSIS — C61 PROSTATE CANCER (H): ICD-10-CM

## 2023-06-13 PROCEDURE — 79101 NUCLEAR RX IV ADMIN: CPT

## 2023-06-13 PROCEDURE — A9607 HC RX 344: HCPCS | Performed by: INTERNAL MEDICINE

## 2023-06-13 PROCEDURE — 344N000001 HC RX 344: Performed by: INTERNAL MEDICINE

## 2023-06-13 PROCEDURE — 79101 NUCLEAR RX IV ADMIN: CPT | Mod: 26 | Performed by: RADIOLOGY

## 2023-06-13 RX ORDER — ALBUTEROL SULFATE 0.83 MG/ML
2.5 SOLUTION RESPIRATORY (INHALATION)
Status: DISCONTINUED | OUTPATIENT
Start: 2023-06-13 | End: 2023-06-14 | Stop reason: HOSPADM

## 2023-06-13 RX ORDER — LORAZEPAM 2 MG/ML
.5-1 INJECTION INTRAMUSCULAR EVERY 6 HOURS PRN
Status: DISCONTINUED | OUTPATIENT
Start: 2023-06-13 | End: 2023-06-14 | Stop reason: HOSPADM

## 2023-06-13 RX ORDER — ONDANSETRON 8 MG/1
8 TABLET, FILM COATED ORAL
Status: DISCONTINUED | OUTPATIENT
Start: 2023-06-13 | End: 2023-06-14 | Stop reason: HOSPADM

## 2023-06-13 RX ORDER — MEPERIDINE HYDROCHLORIDE 25 MG/ML
25 INJECTION INTRAMUSCULAR; INTRAVENOUS; SUBCUTANEOUS EVERY 30 MIN PRN
Status: DISCONTINUED | OUTPATIENT
Start: 2023-06-13 | End: 2023-06-14 | Stop reason: HOSPADM

## 2023-06-13 RX ORDER — ALBUTEROL SULFATE 90 UG/1
1-2 AEROSOL, METERED RESPIRATORY (INHALATION)
Status: DISCONTINUED | OUTPATIENT
Start: 2023-06-13 | End: 2023-06-14 | Stop reason: HOSPADM

## 2023-06-13 RX ORDER — METHYLPREDNISOLONE SODIUM SUCCINATE 125 MG/2ML
125 INJECTION, POWDER, LYOPHILIZED, FOR SOLUTION INTRAMUSCULAR; INTRAVENOUS
Status: DISCONTINUED | OUTPATIENT
Start: 2023-06-13 | End: 2023-06-14 | Stop reason: HOSPADM

## 2023-06-13 RX ORDER — LORAZEPAM 0.5 MG/1
.5-1 TABLET ORAL EVERY 6 HOURS PRN
Status: DISCONTINUED | OUTPATIENT
Start: 2023-06-13 | End: 2023-06-14 | Stop reason: HOSPADM

## 2023-06-13 RX ORDER — DIPHENHYDRAMINE HYDROCHLORIDE 50 MG/ML
50 INJECTION INTRAMUSCULAR; INTRAVENOUS
Status: DISCONTINUED | OUTPATIENT
Start: 2023-06-13 | End: 2023-06-14 | Stop reason: HOSPADM

## 2023-06-13 RX ORDER — PROCHLORPERAZINE MALEATE 10 MG
10 TABLET ORAL EVERY 6 HOURS PRN
Status: DISCONTINUED | OUTPATIENT
Start: 2023-06-13 | End: 2023-06-14 | Stop reason: HOSPADM

## 2023-06-13 RX ORDER — EPINEPHRINE 1 MG/ML
0.3 INJECTION, SOLUTION, CONCENTRATE INTRAVENOUS EVERY 5 MIN PRN
Status: DISCONTINUED | OUTPATIENT
Start: 2023-06-13 | End: 2023-06-14 | Stop reason: HOSPADM

## 2023-06-13 RX ADMIN — LUTETIUM LU 177 VIPIVOTIDE TETRAXETAN 200 MILLICURIE: 27 INJECTION, SOLUTION INTRAVENOUS at 10:13

## 2023-06-13 NOTE — TELEPHONE ENCOUNTER
[facility]: Cancer Care                                                                                          Called pt to inform him MRI studies were normal, nothing suggesting why he is having low back pain that radiates down his left leg to foot. He was prescribed gabapentin by Yamilet Alexis MARTEL last Friday, per pt he only picked it up yesterday and had one dose at bedtime. He was told to start off at bedtime and if he finds it helps, slowly build up to twice a day then three times a day. He stated he did notice he was very groggy getting up to the bathroom, advised him with this information, to wait for a day he is not working or needing to drive a vehicle to try 2nd dose in the day time, he verbalized understanding. He stated he did see MRI results as well, was aware of known mets disease to lumbosacral junction and iliac bones, no new findings. He will update care team if pain should progress. He had his first Pluvicto treatment this morning and stated everything went well. He was excited to have been included in the research study and had extra blood work and signed papers this morning.    Signature:  Sylvia Goel RN

## 2023-06-13 NOTE — PROGRESS NOTES
[facility] Nursing Note:    Patient presents today for Pluvitco therapy, dose 1 of  6  Patient seen by provider today: Yes: Dr. Peña   present during visit today: NOT APPLICABLE      Intravenous Access:  Peripheral IV placed     Treatment Conditions:  Labs done on  06/7/23    Results reviewed, labs Met treatment parameters, ok to proceed with treatment.           Post Infusion Assessment:  Patient tolerated infusion without incident.    PIV - No evidence of extravasations, access discontinued per protocol.         Discharge Plan:   Patient will return as scheduled for next appointment.   Patient discharged at in stable condition accompanied by: wife  Departure Mode: Ambulatory

## 2023-06-13 NOTE — LETTER
May 30, 2023    Walter Reyes  [address] [facility] [address]      Arleen Watson,    You are scheduled to receive PLUVICTO at the [facility]ostics Department at [facility], [address] [place] [address]. The following are your scheduled infusion appointments.  Your Oncology team will schedule labs approximately one week prior to each of the dates below:      6/13/23 at 9am    7/25/23 at 9am    9/5/23 at 9am    10/17/23 at 9am    11/25/23 at 9am    1/9/24 at 9am        When you arrive at the hospital, proceed to the 1st floor (lower level) PET / MRI / [facility]ostics Waiting Room. If you are directed to go to the Gold Waiting Area, please do NOT go there.      Prior to Infusion:  If you have any signs of illness such as fever, cough, or body aches - please notify us immediately.       Day of Infusion:  The treatment will take approximately 2 hours. TV and Wi-Fi are available.   You may bring and take all of your regularly scheduled home medications unless advised differently by your doctor.  Visitors - Due to the risk of radiation exposure within the therapy area, visitors will not be allowed to stay with you during the infusion. Visitors may stay in the adjacent waiting room or leave the facility if they wish until therapy is completed, which will take approximately 2 hours.      Post Infusion Instructions:  To limit how much radiation others around you are exposed to, please do the following for 3 days, unless otherwise noted by the doctor:  Do not use public transportation (airplanes, buses, trains, etc) for 5 days after each treatment  Sleep alone.   No sex for 7 days  Limit close contact with infants, children and pregnant women for 7 days  Drink plenty of fluids.  Use the toilet in a seated position. Flush toilet paper and/or wipes down the toilet and flush twice.  Avoid public transportation (buses, trains, taxis, light  rail) and crowded places (stores, restaurants, theaters, and sporting events).  You should drink plenty of fluids on the days you receive PLUVICTO and after. Generally, the more you urinate, the faster you will get rid of the radiation from your body.    If you have any questions, please call the [facility] nurses at [phone]     or your Nurse Care Coordinator at the clinic.     We look forward to seeing you at your appointment!    The [facility] Radiother[facility]ostics Team

## 2023-06-23 ENCOUNTER — VIRTUAL VISIT (OUTPATIENT)
Dept: PALLIATIVE MEDICINE | Facility: CLINIC | Age: 63
End: 2023-06-23
Payer: COMMERCIAL

## 2023-06-23 DIAGNOSIS — C61 PROSTATE CANCER (H): ICD-10-CM

## 2023-06-23 DIAGNOSIS — M54.10 RADICULOPATHY, UNSPECIFIED SPINAL REGION: ICD-10-CM

## 2023-06-23 PROCEDURE — 99205 OFFICE O/P NEW HI 60 MIN: CPT | Mod: VID | Performed by: NURSE PRACTITIONER

## 2023-06-23 RX ORDER — BUPRENORPHINE 5 UG/H
1 PATCH TRANSDERMAL
Qty: 4 PATCH | Refills: 0 | Status: SHIPPED | OUTPATIENT
Start: 2023-06-23 | End: 2023-07-05

## 2023-06-23 RX ORDER — OXYCODONE HYDROCHLORIDE 5 MG/1
2.5-5 TABLET ORAL EVERY 4 HOURS PRN
Qty: 40 TABLET | Refills: 0 | Status: SHIPPED | OUTPATIENT
Start: 2023-06-23 | End: 2023-07-07

## 2023-06-23 NOTE — NURSING NOTE
Is the patient currently in the state of [place]? YES    Visit mode:VIDEO    If the visit is dropped, the patient can be reconnected by: VIDEO VISIT: Send to e-mail at: [email or web address]    Will anyone else be joining the visit? NO      How would you like to obtain your AVS? MyChart    Are changes needed to the allergy or medication list? NO    Reason for visit: Consult      MONIKA Peoples

## 2023-06-23 NOTE — PROGRESS NOTES
Virtual Visit Details    Type of service:  Video Visit   Video start: 9 AM  Video stop: 9:40 AM    Originating Location (pt. Location): Home    Distant Location (provider location):  On-site  Platform used for Video Visit: [facility]     Palliative Care Outpatient Clinic      Patient ID/Chief Complaint: Walter Reyes 63 year old male who is presenting to the palliative medicine clinic today at the request of Yamilet Espinoza PA-C for a palliative care consultation secondary to metastatic prostate cancer.   The patient's primary care provider is:  Poncho Ortiz       Impression & Recommendations:  63-year-old male with metastatic prostate cancer with spinal and bone metastases.  Palliative care assistance requested for symptom management of chronic cancer associated back pain with radiculopathy in the legs.    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  High risk for recurrence.    Past medical history also includes hyperlipidemia, hypothyroidism, HTN, GERD, Acosta's neuroma, plantar fasciitis, peroneal tendinitis, and mild chemotherapy-induced polyneuropathy.    Radiculopathy, previously in both legs, has improved on gabapentin 100 mg 3 times daily started by oncology.  He continues to have constant pain over the sacrum and to the left of the sacrum.  Pain is constant, deep, aching.  Interferes with sleep.  He is taking 1000 g of Tylenol 4 times daily and tramadol 1-2 times per day.  Pain often worsens to a level of 6 or more.  Pain worsens when reclining or lying down.    Symptoms/recommendations/discussion:    Start Butrans 5 mcg/h weekly patch    Discontinue tramadol    Continue gabapentin 100 mg 3 times daily    Start oxycodone IR 2.5-5 mg every 4 hours as needed    Counseled to monitor for constipation    Counseled opioid safety    Counseled to not drive until clear how medications will affect him    He denies mood  disturbance    Social situation includes residing in home with wife Micheline. Stable housing and food. No h/o substance abuse.     Palliative follow-up within the next 2 weeks        How to get a hold of us:  For non-urgent matters, MyChart works best.    For more urgent matters, or if you prefer not to use MyChart, call our clinic nurse coordinator Sherry Palma RN at [phone] or [phone]    We have an on-call number for evenings and weekends. Please call this only if you are having uncontrolled symptoms or serious side effects from your medicines: [phone].     For refills, please give us a week (5 working days) notice. We don't always have providers available everyday to do refills. If you call the day you run out of your medicine, we may not be able to refill it in time, so call 5 days in advance        History:  History gathered today from: patient, family/loved ones, medical chart, outside records including Care Everywhere      PE: There were no vitals taken for this visit.   Wt Readings from Last 3 Encounters:   05/03/23 118.4 kg (261 lb)   04/24/23 118.8 kg (262 lb)   03/20/23 118.8 kg (262 lb)       Gen alert, comfortable appearing, NAD.   Head NCAT.  Eyes anicteric without injection  Face symmetric, eyes conjugate  Lungs unlabored, no cough, speaking full sentences  Skin no rashes or lesions evident on face/neck  Neuro Face symmetric, eyes conjugate; speech fluent.  Neuropsych exam normal including affect, sensorium, gross memory, thought processes, and fund of knowledge.         Data reviewed:  I reviewed electrolytes, BUN/creatinine, liver profile, hemoglobin and hematocrit, platelet count, and most recent imaging  Recent oncology notes     database reviewed: 6/22        65 minutes spent on the date of the encounter doing chart review, history and exam, patient education & counseling, documentation and other activities as noted above.        Thank you for involving us in the patient's care.    LATESHA Seaman, [facility] Palliative Care Service

## 2023-06-23 NOTE — PATIENT INSTRUCTIONS
Thank you for meeting with us in the [facility] Palliative Care Clinic.    How to get a hold of us:  For non-urgent matters, MyChart works best.    For more urgent matters, or if you prefer not to use MyChart, call our clinic nurse coordinator Sherry Palma RN at [phone] or [phone]    We have an on-call number for evenings and weekends. Please call this only if you are having uncontrolled symptoms or serious side effects from your medicines: [phone].     For refills, please give us a week (5 working days) notice. We don't always have providers available everyday to do refills. If you call the day you run out of your medicine, we may not be able to refill it in time, so call 5 days in advance!

## 2023-07-05 DIAGNOSIS — M54.10 RADICULOPATHY, UNSPECIFIED SPINAL REGION: ICD-10-CM

## 2023-07-05 DIAGNOSIS — C61 PROSTATE CANCER (H): ICD-10-CM

## 2023-07-05 RX ORDER — GABAPENTIN 300 MG/1
300 CAPSULE ORAL 3 TIMES DAILY
Qty: 90 CAPSULE | Refills: 1 | Status: SHIPPED | OUTPATIENT
Start: 2023-07-05 | End: 2023-07-07

## 2023-07-05 RX ORDER — BUPRENORPHINE 5 UG/H
2 PATCH TRANSDERMAL
Qty: 4 PATCH | Refills: 0 | COMMUNITY
Start: 2023-07-05 | End: 2023-07-07 | Stop reason: DRUGHIGH

## 2023-07-06 NOTE — PROGRESS NOTES
I had an opportunity to speak with Mr. Walter Vincent today regarding the Astrin CTC study.      The consent form, including purpose, risks and benefits, was reviewed with Mr. Vincent, and all questions were answered before he signed the consent form. The patient consented and understands the study involves up to three serial 50 mL blood draws.       I, Antonio Workman, discussed the study with the patient, going through the informed consent form page by page and obtained consent. A copy of the signed form was provided to the patient. No procedures specific to this study were performed prior to the patient signing the consent form.     Elligibility was determined and signed off by Antonio Workman prior to blood collection.       Consent and combined HIPAA Version Date: 16 AUG 22 [facility] IRB approval: 12 APR 2023     Consent and combined HIPAA obtained by: Antoino Workman   Date: 13 JUNE 23

## 2023-07-07 ENCOUNTER — VIRTUAL VISIT (OUTPATIENT)
Dept: PALLIATIVE MEDICINE | Facility: CLINIC | Age: 63
End: 2023-07-07
Payer: COMMERCIAL

## 2023-07-07 ENCOUNTER — TELEPHONE (OUTPATIENT)
Dept: PALLIATIVE CARE | Facility: CLINIC | Age: 63
End: 2023-07-07

## 2023-07-07 VITALS
SYSTOLIC BLOOD PRESSURE: 102 MMHG | WEIGHT: 260 LBS | DIASTOLIC BLOOD PRESSURE: 76 MMHG | HEART RATE: 76 BPM | BODY MASS INDEX: 35.21 KG/M2 | HEIGHT: 72 IN

## 2023-07-07 DIAGNOSIS — M54.10 RADICULOPATHY, UNSPECIFIED SPINAL REGION: ICD-10-CM

## 2023-07-07 DIAGNOSIS — C61 PROSTATE CANCER (H): ICD-10-CM

## 2023-07-07 PROCEDURE — 99215 OFFICE O/P EST HI 40 MIN: CPT | Mod: VID | Performed by: NURSE PRACTITIONER

## 2023-07-07 RX ORDER — BUPRENORPHINE 10 UG/H
1 PATCH TRANSDERMAL
Qty: 4 PATCH | Refills: 0 | Status: SHIPPED | OUTPATIENT
Start: 2023-07-07 | End: 2023-07-11 | Stop reason: DRUGHIGH

## 2023-07-07 RX ORDER — GABAPENTIN 300 MG/1
300 CAPSULE ORAL 3 TIMES DAILY
Qty: 90 CAPSULE | Refills: 1 | COMMUNITY
Start: 2023-07-07 | End: 2023-07-28

## 2023-07-07 RX ORDER — OXYCODONE HYDROCHLORIDE 5 MG/1
5-10 TABLET ORAL EVERY 4 HOURS PRN
Qty: 40 TABLET | Refills: 0 | COMMUNITY
Start: 2023-07-07 | End: 2023-07-17

## 2023-07-07 NOTE — NURSING NOTE
Is the patient currently in the state of [place]? YES    Visit mode:VIDEO    If the visit is dropped, the patient can be reconnected by: VIDEO VISIT: Text to cell phone: [phone]    Will anyone else be joining the visit? NO      How would you like to obtain your AVS? MyChart    Are changes needed to the allergy or medication list? Patient declined individual  medication review by support staff because patient denies any changes since echeck-in completion and states all information entered during echeck-in remains accurate.    Reason for visit: RECHECK      No other vitals to report per pt    Evangelina Munguia VF

## 2023-07-07 NOTE — TELEPHONE ENCOUNTER
PA needed for Butrans 10 mcg. Will route to PA Team.    Adriana Arreola RN on 7/7/2023 at 11:27 AM

## 2023-07-07 NOTE — TELEPHONE ENCOUNTER
PA approval on file -    Prior Authorization Approval    Authorization Effective Date: 6/23/2023  Authorization Expiration Date: 6/23/2025  Medication: Buprenorphine - APPROVED  Approved Dose/Quantity:    Reference #:     Insurance Company: [employer] ([facility]) - Phone [phone] Fax [phone]  Expected CoPay:       CoPay Card Available:      Foundation Assistance Needed:    Which Pharmacy is filling the prescription (Not needed for infusion/clinic administered): [id number] - [place], [place] - [address] [address]   Pharmacy Notified: Yes  Patient Notified: Yes  **Instructed pharmacy to notify patient when script is ready to /ship.**

## 2023-07-07 NOTE — PROGRESS NOTES
Virtual Visit Details    Type of service:  Video Visit   Video start: 11 AM  Video stop: 11:25 AM    Originating Location (pt. Location): Home    Distant Location (provider location):  On-site  Platform used for Video Visit: [facility]       Palliative Care Outpatient Clinic      Patient ID/Chief Complaint: Walter Reyes 63 year old male who is presenting to the palliative medicine clinic today at the request of Yamilet Espinoza PA-C for a palliative care follow-up secondary to metastatic prostate cancer.   The patient's primary care provider is:  Poncho Ortiz       Impression & Recommendations:  63-year-old male with metastatic prostate cancer with spinal and bone metastases.  Palliative care assistance requested for symptom management of chronic cancer associated back pain with radiculopathy in the legs.    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  High risk for recurrence.    Past medical history also includes hyperlipidemia, hypothyroidism, HTN, GERD, Acosta's neuroma, plantar fasciitis, peroneal tendinitis, and mild chemotherapy-induced polyneuropathy.    Radiculopathy in both legs, has improved on gabapentin.  He has constant pain over the sacrum and to the left of the sacrum.  Pain is constant, deep, aching.  Interferes with sleep. Pain worsens when reclining or lying down.    Symptoms/recommendations/discussion:    Continue Butrans 10 mcg/h weekly patch---will likely increase to 20mcg/h in near term if he continues to need oxycodone IR more that 3 times per day.     Continue gabapentin 300 mg 3 times daily---may increase bedtime dose to 600 mg since pain ramps up when he reclines in bed    Continue oxycodone IR 5-10 mg every 4 hours as needed    Counseled to monitor for constipation    Counseled opioid safety    He denies mood disturbance    Social situation includes residing in home with wife Micheline. Stable housing and food. No  h/o substance abuse.     Palliative follow-up within the next 4-6 weeks        How to get a hold of us:  For non-urgent matters, MyChart works best.    For more urgent matters, or if you prefer not to use MyChart, call our clinic nurse coordinator Sherry Palma RN at [phone] or [phone]    We have an on-call number for evenings and weekends. Please call this only if you are having uncontrolled symptoms or serious side effects from your medicines: [phone].     For refills, please give us a week (5 working days) notice. We don't always have providers available everyday to do refills. If you call the day you run out of your medicine, we may not be able to refill it in time, so call 5 days in advance        History:  History gathered today from: patient, family/loved ones, medical chart, outside records including Care Everywhere      PE: /76   Pulse 76   Ht 1.829 m (6')   Wt 117.9 kg (260 lb)   BMI 35.26 kg/m     Wt Readings from Last 3 Encounters:   07/07/23 117.9 kg (260 lb)   05/03/23 118.4 kg (261 lb)   04/24/23 118.8 kg (262 lb)       Gen alert, comfortable appearing, NAD.   Head NCAT.  Eyes anicteric without injection  Face symmetric, eyes conjugate  Lungs unlabored, no cough, speaking full sentences  Skin no rashes or lesions evident on face/neck  Neuro Face symmetric, eyes conjugate; speech fluent.  Neuropsych exam normal including affect, sensorium, gross memory, thought processes, and fund of knowledge.         Data reviewed:  I reviewed electrolytes, BUN/creatinine, liver profile, hemoglobin and hematocrit, platelet count, and most recent imaging  Recent oncology notes     database reviewed: 7/6        41 minutes spent on the date of the encounter doing chart review, history and exam, patient education & counseling, documentation and other activities as noted above.        Thank you for involving us in the patient's care.   LATESHA Seaman, [facility] Palliative Care  Service

## 2023-07-07 NOTE — PATIENT INSTRUCTIONS
Thank you for meeting with us in the [facility].    How to get a hold of us:  For non-urgent matters, MyChart works best.    For more urgent matters, or if you prefer not to use MyChart, call our clinic nurse coordinator Sherry Palma RN at [phone] or [phone]    We have an on-call number for evenings and weekends. Please call this only if you are having uncontrolled symptoms or serious side effects from your medicines: [phone].     For refills, please give us a week (5 working days) notice. We don't always have providers available everyday to do refills. If you call the day you run out of your medicine, we may not be able to refill it in time, so call 5 days in advance!     [___ Month(s) Ago] : [unfilled] month(s) ago [Arthralgias] : arthralgias [None] : No associated symptoms are reported [Unlimited ADLs] : able to do activities of daily living without limitations [0] : 0 [Unlimited Sports] : able to participate in sports without limitations [Fever] : no fever [Joint Swelling] : no joint swelling [Joint Deformity] : no joint deformity [Joint Warmth] : no joint warmth [Eye Pain] : no eye pain [Difficulty Walking] : no difficulty walking [Morning Stiffness] : no morning stiffness [Eye Redness] : no eye redness [de-identified] : last seen Feb 2018 [FreeTextEntry1] : 5-2-19\par Doing well since last seen \par had an episode of arm pain - always if pain is around the elbow, occurred about 2 weeks ago but no further leg pain \par However the episode was brief and responded nicely to tylenol\par No URI or fevers since last visit\par No real change in activities\par Is growing nicely\par still not toilet trained\par Speaking more but definitely behind in speech\par

## 2023-07-07 NOTE — TELEPHONE ENCOUNTER
Central Prior Authorization Team   Phone: [phone]    PA Initiation    Medication: BUPRENORPHINE  Insurance Company: [employer] ([facility]) - Phone [phone] Fax [phone]  Pharmacy Filling the Rx: [id number] - [place], [place] - [address]   Filling Pharmacy Phone: [phone]  Filling Pharmacy Fax: [phone]  Start Date: 7/7/2023

## 2023-07-08 NOTE — PLAN OF CARE
PRIMARY DIAGNOSIS: ACUTE PAIN  OUTPATIENT/OBSERVATION GOALS TO BE MET BEFORE DISCHARGE:  1. Pain Status: Improved-controlled with oral pain medications.    2. Return to near baseline physical activity: Yes    3. Cleared for discharge by consultants (if involved): No    Discharge Planner Nurse   Safe discharge environment identified: Yes  Barriers to discharge: Yes       Entered by: Nehal Duran 07/10/2020 5:38 PM     Please review provider order for any additional goals.   Nurse to notify provider when observation goals have been met and patient is ready for discharge.   No

## 2023-07-11 RX ORDER — BUPRENORPHINE 20 UG/H
1 PATCH TRANSDERMAL
Qty: 4 PATCH | Refills: 0 | Status: SHIPPED | OUTPATIENT
Start: 2023-07-11 | End: 2023-07-31

## 2023-07-17 DIAGNOSIS — C61 PROSTATE CANCER (H): ICD-10-CM

## 2023-07-17 DIAGNOSIS — M54.10 RADICULOPATHY, UNSPECIFIED SPINAL REGION: ICD-10-CM

## 2023-07-17 RX ORDER — OXYCODONE HYDROCHLORIDE 5 MG/1
5-10 TABLET ORAL EVERY 4 HOURS PRN
Qty: 40 TABLET | Refills: 0 | Status: SHIPPED | OUTPATIENT
Start: 2023-07-17 | End: 2023-08-02

## 2023-07-17 NOTE — TELEPHONE ENCOUNTER
Received Timehopt message from patient requesting refill of oxycodone.     Last refill: 6/23/23  Last office visit: 7/7/23  Scheduled for follow up 8/2/23     Will route request to MD for review.     Reviewed [place]  Report.

## 2023-07-18 ENCOUNTER — LAB (OUTPATIENT)
Dept: INFUSION THERAPY | Facility: CLINIC | Age: 63
End: 2023-07-18
Attending: INTERNAL MEDICINE
Payer: COMMERCIAL

## 2023-07-18 DIAGNOSIS — C64.1 RENAL CELL CARCINOMA, RIGHT (H): ICD-10-CM

## 2023-07-18 DIAGNOSIS — C79.51 MALIGNANT NEOPLASM OF PROSTATE METASTATIC TO BONE (H): Primary | ICD-10-CM

## 2023-07-18 DIAGNOSIS — C61 MALIGNANT NEOPLASM OF PROSTATE METASTATIC TO BONE (H): Primary | ICD-10-CM

## 2023-07-18 LAB
ALBUMIN SERPL BCG-MCNC: 4.2 G/DL (ref 3.5–5.2)
ALP SERPL-CCNC: 114 U/L (ref 40–129)
ALT SERPL W P-5'-P-CCNC: 22 U/L (ref 0–70)
ANION GAP SERPL CALCULATED.3IONS-SCNC: 11 MMOL/L (ref 7–15)
AST SERPL W P-5'-P-CCNC: 28 U/L (ref 0–45)
BASOPHILS # BLD AUTO: 0 10E3/UL (ref 0–0.2)
BASOPHILS NFR BLD AUTO: 1 %
BILIRUB SERPL-MCNC: 0.2 MG/DL
BUN SERPL-MCNC: 18.4 MG/DL (ref 8–23)
CALCIUM SERPL-MCNC: 8.9 MG/DL (ref 8.8–10.2)
CHLORIDE SERPL-SCNC: 102 MMOL/L (ref 98–107)
CREAT SERPL-MCNC: 0.93 MG/DL (ref 0.67–1.17)
DEPRECATED HCO3 PLAS-SCNC: 25 MMOL/L (ref 22–29)
EOSINOPHIL # BLD AUTO: 0.1 10E3/UL (ref 0–0.7)
EOSINOPHIL NFR BLD AUTO: 3 %
ERYTHROCYTE [DISTWIDTH] IN BLOOD BY AUTOMATED COUNT: 13.8 % (ref 10–15)
GFR SERPL CREATININE-BSD FRML MDRD: >90 ML/MIN/1.73M2
GLUCOSE SERPL-MCNC: 109 MG/DL (ref 70–99)
HCT VFR BLD AUTO: 36 % (ref 40–53)
HGB BLD-MCNC: 12.1 G/DL (ref 13.3–17.7)
IMM GRANULOCYTES # BLD: 0 10E3/UL
IMM GRANULOCYTES NFR BLD: 1 %
LYMPHOCYTES # BLD AUTO: 1.1 10E3/UL (ref 0.8–5.3)
LYMPHOCYTES NFR BLD AUTO: 27 %
MCH RBC QN AUTO: 29.9 PG (ref 26.5–33)
MCHC RBC AUTO-ENTMCNC: 33.6 G/DL (ref 31.5–36.5)
MCV RBC AUTO: 89 FL (ref 78–100)
MONOCYTES # BLD AUTO: 0.4 10E3/UL (ref 0–1.3)
MONOCYTES NFR BLD AUTO: 11 %
NEUTROPHILS # BLD AUTO: 2.2 10E3/UL (ref 1.6–8.3)
NEUTROPHILS NFR BLD AUTO: 57 %
NRBC # BLD AUTO: 0 10E3/UL
NRBC BLD AUTO-RTO: 0 /100
PLATELET # BLD AUTO: 220 10E3/UL (ref 150–450)
POTASSIUM SERPL-SCNC: 4 MMOL/L (ref 3.4–5.3)
PROT SERPL-MCNC: 6.6 G/DL (ref 6.4–8.3)
PSA SERPL DL<=0.01 NG/ML-MCNC: 1.75 NG/ML (ref 0–4.5)
RBC # BLD AUTO: 4.05 10E6/UL (ref 4.4–5.9)
SODIUM SERPL-SCNC: 138 MMOL/L (ref 136–145)
WBC # BLD AUTO: 3.9 10E3/UL (ref 4–11)

## 2023-07-18 PROCEDURE — 36591 DRAW BLOOD OFF VENOUS DEVICE: CPT

## 2023-07-18 PROCEDURE — 85014 HEMATOCRIT: CPT | Performed by: INTERNAL MEDICINE

## 2023-07-18 PROCEDURE — 84153 ASSAY OF PSA TOTAL: CPT | Performed by: INTERNAL MEDICINE

## 2023-07-18 PROCEDURE — 84403 ASSAY OF TOTAL TESTOSTERONE: CPT | Performed by: INTERNAL MEDICINE

## 2023-07-18 PROCEDURE — 80053 COMPREHEN METABOLIC PANEL: CPT | Performed by: INTERNAL MEDICINE

## 2023-07-18 PROCEDURE — 250N000011 HC RX IP 250 OP 636: Mod: JZ | Performed by: INTERNAL MEDICINE

## 2023-07-18 RX ORDER — HEPARIN SODIUM,PORCINE 10 UNIT/ML
5 VIAL (ML) INTRAVENOUS
Status: CANCELLED | OUTPATIENT
Start: 2023-07-18

## 2023-07-18 RX ORDER — HEPARIN SODIUM (PORCINE) LOCK FLUSH IV SOLN 100 UNIT/ML 100 UNIT/ML
5 SOLUTION INTRAVENOUS
Status: DISCONTINUED | OUTPATIENT
Start: 2023-07-18 | End: 2023-07-18 | Stop reason: HOSPADM

## 2023-07-18 RX ORDER — HEPARIN SODIUM (PORCINE) LOCK FLUSH IV SOLN 100 UNIT/ML 100 UNIT/ML
5 SOLUTION INTRAVENOUS
Status: CANCELLED | OUTPATIENT
Start: 2023-07-18

## 2023-07-18 RX ADMIN — Medication 5 ML: at 08:18

## 2023-07-18 NOTE — PROGRESS NOTES
Nursing Note:  Walter Reyes presents today for Port Labs.    Patient seen by provider today: No   present during visit today: Not Applicable.    Note: N/A.    Intravenous Access:  Labs drawn without difficulty.  Implanted Port.    Discharge Plan:   Patient was sent to home after appointment.      Parth Meyer RN

## 2023-07-19 ENCOUNTER — TELEPHONE (OUTPATIENT)
Dept: PALLIATIVE CARE | Facility: CLINIC | Age: 63
End: 2023-07-19

## 2023-07-19 NOTE — TELEPHONE ENCOUNTER
Call placed to patient to check in regarding pain control. He sent a message earlier in the week that he didn't think the butrans patch was helping. Today he reports otherwise. He feels he has less intense spikes in pain throughout the day. He takes tylenol twice daily, then tylenol and oxycodone before bed. He has not needed to take any daytime doses of oxycodone. Reminded him that it'd be ok to take it during the day if needed and it's best to take the medication before pain becomes too severe.     Encouraged him to call or send a [employer] message if he feels pain is not well controlled and/or when refills are needed.    PILO Mckeon, RN  Palliative Care Nurse Clinician    [phone] (Direct)  [phone] (Main)  [phone] (Appointment Scheduling)

## 2023-07-19 NOTE — PROGRESS NOTES
"Virtual Visit Details    Type of service:  Video Visit     Originating Location (pt. Location): Home    Distant Location (provider location):  Off-site  Platform used for Video Visit: [facility] Oncology Followup  Oncologist: Dr. Jj Winters  Jul 21, 2023    REASON FOR VISIT: Follow-up for metastatic castration-resistant prostate cancer    HISTORY OF PRESENT ILLNESS: Mr. 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 His oncologic history is detailed below.     1/6/21  PSA = 0.29  3/31/21 PSA = 0.44  7/7/21  PSA = 0.47  11/2021 PSA = 1.05  -- Start XTANDI  12/16/21 PSA =0.22  2/11/22 PSA= 0.50  3/22/22 PSA=0.72  5/5/2022 PSA=1.79  7/11/2022 PSA=2.16 --Start cycle 1 docetaxel   8/22/22 PSA = 1.36  9/12/22 PSA = 1.09  10/24/22 Cycle #6 of Docetaxel. End of treatment  1/9/23  PSA =0.66  3/20/23  PSA=1.54  4/21/23 PSA = 1.81  T=15  06/07/23          PSA = 1.42  7/18/23 PSA=1.75    Interval History:.     ONCOLOGIC HISTORY:  1. 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 Naylor at [facility]. Prior PSA were 1.4 on 8/16/17, 2.4 on 6/28/16, 2.9 on 6/28/16, and as low as 0.4 on 3/26/2003.   - 1/08/2019: Consultation with Sarah Wilson CNP in Urology clinic. Repeat PSA 9.6.  - 1/16/2019: MRI prostate with contrast - \"This examination is characterized as PIRADS 5- very high probability. Clinically significant cancer is highly likely to be present. There is a large, invasive mass arising from the right peripheral zone and extending into the neurovascular bundle, seminal vesicle, and along the anterolateral right mesorectal fascia. Metastatic right external iliac lymph node. Metastatic " "lesion in the posterior/superior right acetabulum.\"  - 1/25/2019: CT abdomen and pelvis with IV contrast - \"1. Heterogeneous enhancing mass posteriorly in the upper pole of the right kidney measures 4.5 x 5.8 x 5.7 cm (AP by transverse by craniocaudal). It has a small nodular component extending posterior medially which abuts the right psoas muscle. This nodular extension measures 2.1 x 2.1 cm. Minimal stranding about the mass. No definite thrombus within the right renal vein. This renal mass is compatible with a renal cell carcinoma. A paraaortic lymph node situated immediately posterior to the left renal vein measures 1.1 cm in short axis, suspicious for metastasis. 2. A 2 cm right external iliac lymph node is also suspicious for metastases. 3. Multiple sclerotic osseous lesions suspicious for metastases. These include 0.8 and 0.6 cm sclerotic lesions laterally in the right iliac wing (images 53 and 62 respectively). Ill-defined groundglass density laterally in the right acetabulum measuring 1.7 x 1.2 cm corresponds with the lesion identified on MRI. A 0.4 cm groundglass density in the left acetabulum. Sclerotic lesion in the left femoral neck measures 0.9 x 1.6 cm (image 81). Sclerotic metastases would be more compatible with prostate metastases. 4. A 3 subcentimeter hepatic lesions are indeterminate. Metastases would be a consideration. These can be further characterized with liver MRI.\"  - 1/25/2019: NM bone scan - \"There is focal bony uptake in the left femoral neck, right acetabulum, right of midline at the S1 or L5 level of the spine, within multiple bilateral ribs, in the C7 or T1 level of the spine, and anteriorly within the skull. Findings are suspicious for metastases.\"  - 1/31/19: CT chest with contrast - \" No suspicious nodules in the chest.  Stable appearance of a 5.8 cm right renal mass concerning for renal carcinoma until proven otherwise.  Stable indeterminant subcentimeter hypodensities in the " "liver.  Multifocal osteoblastic metastasis including 2.5 cm lesion in the right fifth rib posteriorly and a 1.6 cm lesion in the right third rib posteriorly. No lytic lesions identified.\"  - 2/6/19: CT-guided right sclerotic fifth rib lesion biopsy - \"Metastatic carcinoma, consistent with prostate primary.  Immunohistochemical stains performed show the metastatic carcinoma stains positive for NKX3.1 (prostate marker), negative for STACIE 3 and PAX8, which supports the above diagnosis.\"  - 2/15/19: Started Casodex 50mg every day and consented for the biobanking protocol. 3/18/19 - stopped Casodex.  - 2/19/19: Case discussed in tumor board - recommendation for nephectomy for suspected malignant right renal mass.   - 2/26/19: Started Lupron 22.5mg every 3 months.   - 5/8/19: Started Docetaxel 75mg/m2 IV every 3 weeks. 06/18/19 - cycle 3, 7/10/19 - cycle 4, 07/31/19 - cycle 5, 08/21/19 - cycle 6.   - 10/2/19: Restaging CT CAP and NM Bone Scan showed improved osseous disease, no evidence of recurrent or new metastatic disease.  - 1/5/20: Restaging CT CAP and NM Bone Scan showed stable osseous disease, no evidence of recurrent or new metastatic disease.  - 4/6/20: NM bone scan with slightly improved uptake in known skeletal mets. CT C/A/P with contrast with stable osseous mets, no yusuf enlargement, no new visceral mets etc.  - 04/08/20: Zometa every 3 months.  - 10/5/20: CT C/A/P with contrast and NM bone scan - SD.   - 1/4/21: CT C/A/P with contrast and NM bone scan - SD but slight increase in right 5th rib tracer uptake and in posterior L5 sclerosis.   - 03/29/21: CT C/A/P with contrast - single new right sacral 8mm bone lesion (suspicious), other bone mets stable. No visceral/yusuf disease. Nephrectomy site without recurrence. NM bone scan - SD but \"increased uptake associated with the prior lesions of the lower  cervical spine, posterior right fourth rib and right L5 posterior arch elements. Otherwise unchanged uptake " "associated with the proximal left femur and left anterior fourth rib. Similar uptake of the left halux, possibly secondary to degenerative osteoarthritis or gout.\"      Radiation history:   C7         3,000 cGy       06 X      8/05/2021        8/18/2021        13       10  2 Sacrum          2,500 cGy       18 X      4/12/2022        4/18/2022         6               Sacrum retreat               700 cGy        18 X      2/28/2023        2/28/2023    2. Stage III ([id number]), grade 3 of 4, clear-cell RCC of right kidney:  - Incidentally diagnosed as above.   - Underwent curative-intent robotic right radical nephrectomy with Dr. Wesley Cleveland on 3/19/19. No tumor spillage per op report.   - Path showed: \"Histologic Type: Clear cell renal cell carcinoma; Sarcomatoid Features: Not identified; Histologic Grade: Nucleolar grade 3 (WHO/ISUP). Extent Tumor Size: 4.3 cm. Microscopic Tumor Extension: Tumor extension into renal sinus (in vascular structures). Margins: Negative. Tumor Necrosis: Present; focal. Lymph-Vascular Invasion: Not identified.  Pathologic Staging (pTNM) Primary Tumor (pT): pT3a: Tumor extends into renal vein branches/renal sinus. Regional Lymph Nodes (pN): pNX. Number of Lymph Nodes Examined: 0 Distant Metastasis (pM): pM N/A.\"  - Restaging scans as above.    INTERVAL HISTORY:   Walter is here for a pre-pluvicto appointment.   First one went ok, nausea and fatigue   7th day he turned a corner and returned to his baseline  Having good relief pain-wise, until yesterday, needed more tylenol  Overall 3/10, left leg/sacram pain  Working with palliative     PAST MEDICAL HISTORY:  Past Medical History:   Diagnosis Date     Cancer of kidney, right (H)      Complication of anesthesia     slow wakeup      Malignant neoplasm of prostate metastatic to bone (H) 2/14/2019     Thyroid disease      PAST SURGICAL HISTORY:   Past Surgical History:   Procedure Laterality Date     CYSTOSCOPY      about 10 years ago     " INSERT PORT VASCULAR ACCESS Right 5/2/2019    Procedure: Chest Port Placement;  Surgeon: Tate Burciaga PA-C;  Location: [facility] OR     IR CHEST PORT PLACEMENT > 5 YRS OF AGE  5/2/2019     LAPAROSCOPIC NEPHRECTOMY Right 3/19/2019    Procedure: Right laparoscopic radical nephrectomy;  Surgeon: Wesley Cleveland MD;  Location: [facility] OR      SOCIAL HISTORY:   Social History     Tobacco Use     Smoking status: Never     Smokeless tobacco: Never   Substance Use Topics     Drug use: No     FAMILY HISTORY:   Family History   Problem Relation Age of Onset     Diabetes Father      ALLERGIES:   Allergies   Allergen Reactions     Amlodipine Other (See Comments)     Leg swelling     CURRENT MEDICATIONS:   Current Outpatient Medications:      atorvastatin (LIPITOR) 20 MG tablet, Take 60 mg by mouth daily , Disp: , Rfl:      buprenorphine (BUTRANS) 20 MCG/HR WK patch, Place 1 patch onto the skin every 7 days, Disp: 4 patch, Rfl: 0     calcium citrate-vitamin D (CITRACAL) 315-250 MG-UNIT TABS per tablet, Take 650 mg by mouth 2 times daily , Disp: , Rfl:      cycloSPORINE (RESTASIS) 0.05 % ophthalmic emulsion, Place 1 drop into both eyes 2 times daily , Disp: , Rfl:      dexamethasone (DECADRON) 1 MG tablet, Take 0.5 tablets (0.5 mg) by mouth 2 times daily (with meals) for 14 days, Disp: 14 tablet, Rfl: 0     fluconazole (DIFLUCAN) 200 MG tablet, Take 1 tablet (200 mg) by mouth daily, Disp: 60 tablet, Rfl: 1     gabapentin (NEURONTIN) 300 MG capsule, Take 1 capsule (300 mg) by mouth 3 times daily May take 600 mg for evening, 8 PM, dose., Disp: 90 capsule, Rfl: 1     Glucosamine-Chondroit-Vit C-Mn (GLUCOSAMINE 1500 COMPLEX) CAPS, Take 1 capsule by mouth daily, Disp: , Rfl:      levothyroxine (SYNTHROID/LEVOTHROID) 112 MCG tablet, Take 112 mcg by mouth daily, Disp: , Rfl:      loratadine (CLARITIN) 10 MG tablet, , Disp: , Rfl:      Multiple Vitamins-Minerals (MULTIVITAMIN ADULT EXTRA C PO), Take 1 tablet by mouth every 24  hours, Disp: , Rfl:      Nutritional Supplements (SALMON OIL) CAPS, Take 2 capsules by mouth daily , Disp: , Rfl:      omeprazole (PRILOSEC) 20 MG DR capsule, Take 20 mg by mouth daily, Disp: , Rfl:      oxyCODONE (ROXICODONE) 5 MG tablet, Take 1-2 tablets (5-10 mg) by mouth every 4 hours as needed for pain, Disp: 40 tablet, Rfl: 0     tamsulosin (FLOMAX) 0.4 MG capsule, Take 1 capsule (0.4 mg) by mouth daily, Disp: 30 capsule, Rfl: 3     triamterene-HCTZ (MAXZIDE-25) 37.5-25 MG tablet, Take 1 tablet by mouth daily, Disp: , Rfl:     Physical Exam:   Video physical exam  General: Patient appears well in no acute distress.   Skin: No visualized rash or lesions on visualized skin  Eyes: EOMI, no erythema, sclera icterus or discharge noted  Resp: Appears to be breathing comfortably without accessory muscle usage, speaking in full sentences, no cough  MSK: Appears to have normal range of motion based on visualized movements  Neurologic: No apparent tremors, facial movements symmetric  Psych: affect bright, alert and oriented      ECOG PS 1.    LABORATORY DATA:  Most Recent 3 CBC's:  Recent Labs   Lab Test 07/18/23 0818 06/07/23  0852 04/21/23  0810   WBC 3.9* 5.7 5.3   HGB 12.1* 12.4* 12.0*   MCV 89 92 89    245 199   ANEUTAUTO 2.2 3.3 3.1     Most Recent 3 BMP's:  Recent Labs   Lab Test 07/18/23 0818 06/07/23  0852 04/21/23  0810    137 140   POTASSIUM 4.0 4.2 4.0   CHLORIDE 102 101 105   CO2 25 25 24   BUN 18.4 21.3 16.9   CR 0.93 1.10 1.04   ANIONGAP 11 11 11   BETHANY 8.9 9.2 9.2   * 98 104*   PROTTOTAL 6.6 6.9 6.7   ALBUMIN 4.2 4.3 4.1    Most Recent 3 LFT's:  Recent Labs   Lab Test 07/18/23  0818 06/07/23  0852 04/21/23  0810   AST 28 23 19   ALT 22 25 19   ALKPHOS 114 95 67   BILITOTAL 0.2 0.2 0.2    Most Recent 2 TSH and T4:  Recent Labs   Lab Test 07/07/21  0834 03/31/21  0824   TSH 1.67 1.63       I reviewed the above labs today.      12/6/22 repeat MRI  IMPRESSION:   1. Decreased enhancement  "within the C7 metastasis with decreased  epidural enhancement, consistent with response to therapy.  2. Radiation changes within the adjacent vertebrae.  3. No new metastases.  4. Mild degenerative change.  5. Small mass within the left deep lobe of the left parotid gland with  fluid-fluid level which may represent primary parotid tumor.  6. Slight fullness within the left vallecula which presumably  represents submucosal cyst. Direct visualization could be considered.        ASSESSMENT & PLAN: Mr. Reyes is a delightful 63 year old gentleman with metastatic castration sensitive prostate adenocarcinoma as well as an incidentally diagnosed stage III clear-cell renal cell carcinoma of the right kidney (s/p radical R nephrectomy 3/19/19), who is here for a follow-up visit and initiation of docetafor now metastatic castration-resistant prostate cancer.     1. Metastatic castration-resistant prostate cancer, with involvement of the bones and RPLN:   - Patient met the criteria for CHAARTED \"high-volume\" metastatic hormone-sensitive prostate cancer. He opted for docetaxel in combination with ADT (per JOSEFA, [id number], KARLA).     Walter has now been off chemotherapy since October 2022.  Last PSA was a decline compared to the previous.  PSMA PET scan Nov 2022 reveals persistent yusuf and bony disease that is evident as well as evidence of a lesion in C7.     --PSA stable s/p pluvicto cycle 1.  Proceed with cycle 2 Monday  --establish with Dr. Mcnair prior to cycle 3    --Lupron due next August 2023.      2. Bone metastases:   - Noted to have osseous metastatic disease at the time of diagnosis. S/p radiation to C spine and sacrum (Re-irradiation to sacrum).   - worsening radiculopathy down starting in the low back and radiating down the left leg in June. MRI read was without new/acute abnormalities  - Pain continues to fluctuate but overall well controlled/managable   - pain mgmt per palliative care, on " butrans patch now  - Encouraged to continue calcium and vitamin D supplementation; continue Zometa 4mg IV every ~3 months. Due next in August 2023.        3. Stage 3, UISS-high risk ccRCC: s/p R nephrectomy   - approximately 40-50% risk of recurrence after definitive surgery.   - no clear evidence of residual disease at this time; the retroperitoneal lymphadenopathy is more consistent with metastatic prostate cancer   - Continue active surveillance with self-reporting for signs/symptoms of recurrence (this was previously explained in detail) and periodic H&P and scans.      30 minutes spent on the date of the encounter doing chart review, review of test results, interpretation of tests, patient visit and documentation     Veda Greenwood CNP on 7/21/2023 at 8:07 AM

## 2023-07-21 ENCOUNTER — VIRTUAL VISIT (OUTPATIENT)
Dept: ONCOLOGY | Facility: CLINIC | Age: 63
End: 2023-07-21
Attending: NURSE PRACTITIONER
Payer: COMMERCIAL

## 2023-07-21 VITALS
BODY MASS INDEX: 35.49 KG/M2 | DIASTOLIC BLOOD PRESSURE: 94 MMHG | SYSTOLIC BLOOD PRESSURE: 128 MMHG | WEIGHT: 262 LBS | HEIGHT: 72 IN

## 2023-07-21 DIAGNOSIS — C61 MALIGNANT NEOPLASM OF PROSTATE METASTATIC TO BONE (H): Primary | ICD-10-CM

## 2023-07-21 DIAGNOSIS — C79.51 MALIGNANT NEOPLASM OF PROSTATE METASTATIC TO BONE (H): Primary | ICD-10-CM

## 2023-07-21 DIAGNOSIS — C79.51 MALIGNANT NEOPLASM METASTATIC TO BONE (H): ICD-10-CM

## 2023-07-21 DIAGNOSIS — T45.1X5A CHEMOTHERAPY-INDUCED NAUSEA: ICD-10-CM

## 2023-07-21 DIAGNOSIS — R11.0 CHEMOTHERAPY-INDUCED NAUSEA: ICD-10-CM

## 2023-07-21 LAB — TESTOST SERPL-MCNC: 4 NG/DL (ref 240–950)

## 2023-07-21 PROCEDURE — 99214 OFFICE O/P EST MOD 30 MIN: CPT | Mod: VID | Performed by: NURSE PRACTITIONER

## 2023-07-21 RX ORDER — PROCHLORPERAZINE MALEATE 10 MG
10 TABLET ORAL EVERY 6 HOURS PRN
Qty: 90 TABLET | Refills: 1 | Status: SHIPPED | OUTPATIENT
Start: 2023-07-21 | End: 2024-02-13

## 2023-07-21 ASSESSMENT — PAIN SCALES - GENERAL: PAINLEVEL: MILD PAIN (2)

## 2023-07-21 NOTE — Clinical Note
"    7/21/2023         RE: Walter Reyes  [id number] [facility] [id number]        Dear Colleague,    Thank you for referring your patient, Walter Reyes, to the [facility]. Please see a copy of my visit note below.    Virtual Visit Details    Type of service:  Video Visit     Originating Location (pt. Location): Home  {PROVIDER LOCATION On-site should be selected for visits conducted from your clinic location or adjoining [facility] hospital, academic office, or other nearby [facility] building. Off-site should be selected for all other provider locations, including home:[id number]}  Distant Location (provider location):  {virtual location provider:[id number]}  Platform used for Video Visit: {Virtual Visit Platforms:[id number]::\"[employer]\"}        [facility] Oncology Followup  Oncologist: Dr. Jj Winters  Jul 21, 2023    REASON FOR VISIT: Follow-up for metastatic castration-resistant prostate cancer    HISTORY OF PRESENT ILLNESS: Mr. 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 His oncologic history is detailed below.     1/6/21  PSA = 0.29  3/31/21 PSA = 0.44  7/7/21  PSA = 0.47  11/2021 PSA = 1.05  -- Start XTANDI  12/16/21 PSA =0.22  2/11/22 PSA= 0.50  3/22/22 PSA=0.72  5/5/2022 PSA=1.79  7/11/2022 PSA=2.16 --Start cycle 1 docetaxel   8/22/22 PSA = 1.36  9/12/22 PSA = 1.09  10/24/22 Cycle #6 of Docetaxel. End of treatment  1/9/23  PSA =0.66  3/20/23  PSA=1.54  4/21/23 PSA = 1.81  T=15  06/07/23          PSA = 1.42    Interval History:.     ONCOLOGIC HISTORY:  1. 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 Naylor at [facility]. Prior PSA were 1.4 on " "8/16/17, 2.4 on 6/28/16, 2.9 on 6/28/16, and as low as 0.4 on 3/26/2003.   - 1/08/2019: Consultation with Sarah Wilson CNP in Urology clinic. Repeat PSA 9.6.  - 1/16/2019: MRI prostate with contrast - \"This examination is characterized as PIRADS 5- very high probability. Clinically significant cancer is highly likely to be present. There is a large, invasive mass arising from the right peripheral zone and extending into the neurovascular bundle, seminal vesicle, and along the anterolateral right mesorectal fascia. Metastatic right external iliac lymph node. Metastatic lesion in the posterior/superior right acetabulum.\"  - 1/25/2019: CT abdomen and pelvis with IV contrast - \"1. Heterogeneous enhancing mass posteriorly in the upper pole of the right kidney measures 4.5 x 5.8 x 5.7 cm (AP by transverse by craniocaudal). It has a small nodular component extending posterior medially which abuts the right psoas muscle. This nodular extension measures 2.1 x 2.1 cm. Minimal stranding about the mass. No definite thrombus within the right renal vein. This renal mass is compatible with a renal cell carcinoma. A paraaortic lymph node situated immediately posterior to the left renal vein measures 1.1 cm in short axis, suspicious for metastasis. 2. A 2 cm right external iliac lymph node is also suspicious for metastases. 3. Multiple sclerotic osseous lesions suspicious for metastases. These include 0.8 and 0.6 cm sclerotic lesions laterally in the right iliac wing (images 53 and 62 respectively). Ill-defined groundglass density laterally in the right acetabulum measuring 1.7 x 1.2 cm corresponds with the lesion identified on MRI. A 0.4 cm groundglass density in the left acetabulum. Sclerotic lesion in the left femoral neck measures 0.9 x 1.6 cm (image 81). Sclerotic metastases would be more compatible with prostate metastases. 4. A 3 subcentimeter hepatic lesions are indeterminate. Metastases would be a consideration. These " "can be further characterized with liver MRI.\"  - 1/25/2019: NM bone scan - \"There is focal bony uptake in the left femoral neck, right acetabulum, right of midline at the S1 or L5 level of the spine, within multiple bilateral ribs, in the C7 or T1 level of the spine, and anteriorly within the skull. Findings are suspicious for metastases.\"  - 1/31/19: CT chest with contrast - \" No suspicious nodules in the chest.  Stable appearance of a 5.8 cm right renal mass concerning for renal carcinoma until proven otherwise.  Stable indeterminant subcentimeter hypodensities in the liver.  Multifocal osteoblastic metastasis including 2.5 cm lesion in the right fifth rib posteriorly and a 1.6 cm lesion in the right third rib posteriorly. No lytic lesions identified.\"  - 2/6/19: CT-guided right sclerotic fifth rib lesion biopsy - \"Metastatic carcinoma, consistent with prostate primary.  Immunohistochemical stains performed show the metastatic carcinoma stains positive for NKX3.1 (prostate marker), negative for STACIE 3 and PAX8, which supports the above diagnosis.\"  - 2/15/19: Started Casodex 50mg every day and consented for the biobanking protocol. 3/18/19 - stopped Casodex.  - 2/19/19: Case discussed in tumor board - recommendation for nephectomy for suspected malignant right renal mass.   - 2/26/19: Started Lupron 22.5mg every 3 months.   - 5/8/19: Started Docetaxel 75mg/m2 IV every 3 weeks. 06/18/19 - cycle 3, 7/10/19 - cycle 4, 07/31/19 - cycle 5, 08/21/19 - cycle 6.   - 10/2/19: Restaging CT CAP and NM Bone Scan showed improved osseous disease, no evidence of recurrent or new metastatic disease.  - 1/5/20: Restaging CT CAP and NM Bone Scan showed stable osseous disease, no evidence of recurrent or new metastatic disease.  - 4/6/20: NM bone scan with slightly improved uptake in known skeletal mets. CT C/A/P with contrast with stable osseous mets, no yusuf enlargement, no new visceral mets etc.  - 04/08/20: Zometa every 3 " "months.  - 10/5/20: CT C/A/P with contrast and NM bone scan - SD.   - 1/4/21: CT C/A/P with contrast and NM bone scan - SD but slight increase in right 5th rib tracer uptake and in posterior L5 sclerosis.   - 03/29/21: CT C/A/P with contrast - single new right sacral 8mm bone lesion (suspicious), other bone mets stable. No visceral/yusuf disease. Nephrectomy site without recurrence. NM bone scan - SD but \"increased uptake associated with the prior lesions of the lower  cervical spine, posterior right fourth rib and right L5 posterior arch elements. Otherwise unchanged uptake associated with the proximal left femur and left anterior fourth rib. Similar uptake of the left halux, possibly secondary to degenerative osteoarthritis or gout.\"      Radiation history:   C7         3,000 cGy       06 X      8/05/2021        8/18/2021        13       10  2 Sacrum          2,500 cGy       18 X      4/12/2022        4/18/2022         6               Sacrum retreat               700 cGy        18 X      2/28/2023        2/28/2023    2. Stage III ([id number]), grade 3 of 4, clear-cell RCC of right kidney:  - Incidentally diagnosed as above.   - Underwent curative-intent robotic right radical nephrectomy with Dr. Wesley Cleveland on 3/19/19. No tumor spillage per op report.   - Path showed: \"Histologic Type: Clear cell renal cell carcinoma; Sarcomatoid Features: Not identified; Histologic Grade: Nucleolar grade 3 (WHO/ISUP). Extent Tumor Size: 4.3 cm. Microscopic Tumor Extension: Tumor extension into renal sinus (in vascular structures). Margins: Negative. Tumor Necrosis: Present; focal. Lymph-Vascular Invasion: Not identified.  Pathologic Staging (pTNM) Primary Tumor (pT): pT3a: Tumor extends into renal vein branches/renal sinus. Regional Lymph Nodes (pN): pNX. Number of Lymph Nodes Examined: 0 Distant Metastasis (pM): pM N/A.\"  - Restaging scans as above.    INTERVAL HISTORY:   Walter is here for a pre-pluvicto appointment.   First " one went ok, nausea and fatigue   7th day he turned a corn  Having good relief pain wise, until yesterday  Using tylenol less--almost all day      His left leg pain/radiculopathy completed resolved with one fraction of radiation. No new complaints.     PAST MEDICAL HISTORY:  Past Medical History:   Diagnosis Date     Cancer of kidney, right (H)      Complication of anesthesia     slow wakeup      Malignant neoplasm of prostate metastatic to bone (H) 2/14/2019     Thyroid disease      PAST SURGICAL HISTORY:   Past Surgical History:   Procedure Laterality Date     CYSTOSCOPY      about 10 years ago     INSERT PORT VASCULAR ACCESS Right 5/2/2019    Procedure: Chest Port Placement;  Surgeon: Tate Burciaga PA-C;  Location: [facility] OR     IR CHEST PORT PLACEMENT > 5 YRS OF AGE  5/2/2019     LAPAROSCOPIC NEPHRECTOMY Right 3/19/2019    Procedure: Right laparoscopic radical nephrectomy;  Surgeon: Wesley Cleveland MD;  Location: [facility] OR      SOCIAL HISTORY:   Social History     Tobacco Use     Smoking status: Never     Smokeless tobacco: Never   Substance Use Topics     Drug use: No     FAMILY HISTORY:   Family History   Problem Relation Age of Onset     Diabetes Father      ALLERGIES:   Allergies   Allergen Reactions     Amlodipine Other (See Comments)     Leg swelling     CURRENT MEDICATIONS:   Current Outpatient Medications:      atorvastatin (LIPITOR) 20 MG tablet, Take 60 mg by mouth daily , Disp: , Rfl:      buprenorphine (BUTRANS) 20 MCG/HR WK patch, Place 1 patch onto the skin every 7 days, Disp: 4 patch, Rfl: 0     calcium citrate-vitamin D (CITRACAL) 315-250 MG-UNIT TABS per tablet, Take 650 mg by mouth 2 times daily , Disp: , Rfl:      cycloSPORINE (RESTASIS) 0.05 % ophthalmic emulsion, Place 1 drop into both eyes 2 times daily , Disp: , Rfl:      dexamethasone (DECADRON) 1 MG tablet, Take 0.5 tablets (0.5 mg) by mouth 2 times daily (with meals) for 14 days, Disp: 14 tablet, Rfl: 0     fluconazole  (DIFLUCAN) 200 MG tablet, Take 1 tablet (200 mg) by mouth daily, Disp: 60 tablet, Rfl: 1     gabapentin (NEURONTIN) 300 MG capsule, Take 1 capsule (300 mg) by mouth 3 times daily May take 600 mg for evening, 8 PM, dose., Disp: 90 capsule, Rfl: 1     Glucosamine-Chondroit-Vit C-Mn (GLUCOSAMINE 1500 COMPLEX) CAPS, Take 1 capsule by mouth daily, Disp: , Rfl:      levothyroxine (SYNTHROID/LEVOTHROID) 112 MCG tablet, Take 112 mcg by mouth daily, Disp: , Rfl:      loratadine (CLARITIN) 10 MG tablet, , Disp: , Rfl:      Multiple Vitamins-Minerals (MULTIVITAMIN ADULT EXTRA C PO), Take 1 tablet by mouth every 24 hours, Disp: , Rfl:      Nutritional Supplements (SALMON OIL) CAPS, Take 2 capsules by mouth daily , Disp: , Rfl:      omeprazole (PRILOSEC) 20 MG DR capsule, Take 20 mg by mouth daily, Disp: , Rfl:      oxyCODONE (ROXICODONE) 5 MG tablet, Take 1-2 tablets (5-10 mg) by mouth every 4 hours as needed for pain, Disp: 40 tablet, Rfl: 0     tamsulosin (FLOMAX) 0.4 MG capsule, Take 1 capsule (0.4 mg) by mouth daily, Disp: 30 capsule, Rfl: 3     triamterene-HCTZ (MAXZIDE-25) 37.5-25 MG tablet, Take 1 tablet by mouth daily, Disp: , Rfl:     Physical Exam:   Video physical exam  General: Patient appears well in no acute distress.   Skin: No visualized rash or lesions on visualized skin  Eyes: EOMI, no erythema, sclera icterus or discharge noted  Resp: Appears to be breathing comfortably without accessory muscle usage, speaking in full sentences, no cough  MSK: Appears to have normal range of motion based on visualized movements  Neurologic: No apparent tremors, facial movements symmetric  Psych: affect bright, alert and oriented      ECOG PS 1.    LABORATORY DATA:  Most Recent 3 CBC's:  Recent Labs   Lab Test 07/18/23  0818 06/07/23  0852 04/21/23  0810   WBC 3.9* 5.7 5.3   HGB 12.1* 12.4* 12.0*   MCV 89 92 89    245 199   ANEUTAUTO 2.2 3.3 3.1     Most Recent 3 BMP's:  Recent Labs   Lab Test 07/18/23  0818  06/07/23  0852 04/21/23  0810    137 140   POTASSIUM 4.0 4.2 4.0   CHLORIDE 102 101 105   CO2 25 25 24   BUN 18.4 21.3 16.9   CR 0.93 1.10 1.04   ANIONGAP 11 11 11   BETHANY 8.9 9.2 9.2   * 98 104*   PROTTOTAL 6.6 6.9 6.7   ALBUMIN 4.2 4.3 4.1    Most Recent 3 LFT's:  Recent Labs   Lab Test 07/18/23  0818 06/07/23  0852 04/21/23  0810   AST 28 23 19   ALT 22 25 19   ALKPHOS 114 95 67   BILITOTAL 0.2 0.2 0.2    Most Recent 2 TSH and T4:  Recent Labs   Lab Test 07/07/21  0834 03/31/21  0824   TSH 1.67 1.63       I reviewed the above labs today.    Component      Latest Ref Rng 1/9/2023  7:36 AM 2/6/2023  9:02 AM 3/20/2023  9:52 AM 4/21/2023  8:10 AM   PSA Tumor Marker      0.00 - 4.50 ng/mL 0.66  0.86  1.54  1.81      Component      Latest Ref Rng 6/7/2023  8:52 AM   PSA Tumor Marker      0.00 - 4.50 ng/mL 1.42        Component      Latest Ref Rng 1/9/2023  7:36 AM 2/6/2023  9:02 AM 3/20/2023  9:52 AM 4/21/2023  8:10 AM   Testosterone Total      240 - 950 ng/dL 6 (L)  6 (L)  6 (L)  15 (L)      Component      Latest Ref Rng 6/7/2023  8:52 AM   Testosterone Total      240 - 950 ng/dL <2 (L)        12/6/22 repeat MRI  IMPRESSION:   1. Decreased enhancement within the C7 metastasis with decreased  epidural enhancement, consistent with response to therapy.  2. Radiation changes within the adjacent vertebrae.  3. No new metastases.  4. Mild degenerative change.  5. Small mass within the left deep lobe of the left parotid gland with  fluid-fluid level which may represent primary parotid tumor.  6. Slight fullness within the left vallecula which presumably  represents submucosal cyst. Direct visualization could be considered.        ASSESSMENT & PLAN: Mr. Reyes is a delightful 63 year old gentleman with metastatic castration sensitive prostate adenocarcinoma as well as an incidentally diagnosed stage III clear-cell renal cell carcinoma of the right kidney (s/p radical R nephrectomy 3/19/19), who is here for  "a follow-up visit and initiation of docetafor now metastatic castration-resistant prostate cancer.     1. Metastatic castration-resistant prostate cancer, with involvement of the bones and RPLN:   - Patient met the criteria for CHAARTED \"high-volume\" metastatic hormone-sensitive prostate cancer. He opted for docetaxel in combination with ADT (per JOSEFA, [id number], FELIPEEDE).     Walter has now been off chemotherapy since October 2022.  Last PSA was a decline compared to the previous.  PSMA PET scan Nov 2022 reveals persistent yusuf and bony disease that is evident as well as evidence of a lesion in C7. MRI C spine showed stability. Difficult to interpret PSMA avid scans in the context of a declining PSA in the case of a radiated site but did get additional radiation to sacrum with relief of symptoms.   --PSA up trending, now down slightly at 1.40 (previously 1.80). Clinically, patient is having more pain. Therefore, will proceed with pluvicto cycle 1 as planned on Tuesday 06/13/23. Discussed plan with Dr. Winters.   - Veronicaron due next August 2023.      2. Bone metastases:   - Noted to have osseous metastatic disease at the time of diagnosis. S/p radiation to C spine and sacrum (Re-irradiation to sacrum).   - worsening radiculopathy down starting in the low back and radiating down the left leg. Final MRI read pending as above. On preliminary read there is edema and possible nerve impingement from L5 metastasis. No high grade stenosis.   - Okay to continue tramadol, tylenol, and finish corticosteroid course. Will also add in gabapentin starting low at 100 mg TID with option to taper upwards. Previously had similar pain that had initially resolved with radiation.    - counseled patient on s/s of cord compression and when to call back/present to the ER.   - palliative care consult placed.   - Encouraged to continue calcium and vitamin D supplementation.  - Will continue Zometa 4mg IV every ~3 months. Due next in August " 2023.        3. Stage 3, UISS-high risk ccRCC: s/p R nephrectomy   - approximately 40-50% risk of recurrence after definitive surgery.   - no clear evidence of residual disease at this time; the retroperitoneal lymphadenopathy is more consistent with metastatic prostate cancer   - Continue active surveillance with self-reporting for signs/symptoms of recurrence (this was previously explained in detail) and periodic H&P and scans.                   Virtual Visit Details    Type of service:  Video Visit     Originating Location (pt. Location): Home  {PROVIDER LOCATION On-site should be selected for visits conducted from your clinic location or adjoining [facility] hospital, academic office, or other nearby [facility] building. Off-site should be selected for all other provider locations, including home:[id number]}  Distant Location (provider location):  Off-site  Platform used for Video Visit: [facility] Oncology Followup  Oncologist: Dr. Jj Winters  Jul 21, 2023    REASON FOR VISIT: Follow-up for metastatic castration-resistant prostate cancer    HISTORY OF PRESENT ILLNESS: Mr. 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 His oncologic history is detailed below.     1/6/21  PSA = 0.29  3/31/21 PSA = 0.44  7/7/21  PSA = 0.47  11/2021 PSA = 1.05  -- Start XTANDI  12/16/21 PSA =0.22  2/11/22 PSA= 0.50  3/22/22 PSA=0.72  5/5/2022 PSA=1.79  7/11/2022 PSA=2.16 --Start cycle 1 docetaxel   8/22/22 PSA = 1.36  9/12/22 PSA = 1.09  10/24/22 Cycle #6 of Docetaxel. End of treatment  1/9/23  PSA =0.66  3/20/23  PSA=1.54  4/21/23 PSA = 1.81  T=15  06/07/23          PSA = 1.42  7/18/23 PSA=1.75    Interval History:.     ONCOLOGIC HISTORY:  1. 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 Naylor [facility]. Prior PSA were 1.4 on 8/16/17, 2.4 on 6/28/16, 2.9 on 6/28/16, and as low as 0.4 on 3/26/2003.   - 1/08/2019: Consultation with Sarah Wilson CNP in Urology clinic. Repeat PSA 9.6.  - 1/16/2019: MRI prostate with contrast - \"This examination is characterized as PIRADS 5- very high probability. Clinically significant cancer is highly likely to be present. There is a large, invasive mass arising from the right peripheral zone and extending into the neurovascular bundle, seminal vesicle, and along the anterolateral right mesorectal fascia. Metastatic right external iliac lymph node. Metastatic lesion in the posterior/superior right acetabulum.\"  - 1/25/2019: CT abdomen and pelvis with IV contrast - \"1. Heterogeneous enhancing mass posteriorly in the upper pole of the right kidney measures 4.5 x 5.8 x 5.7 cm (AP by transverse by craniocaudal). It has a small nodular component extending posterior medially which abuts the right psoas muscle. This nodular extension measures 2.1 x 2.1 cm. Minimal stranding about the mass. No definite thrombus within the right renal vein. This renal mass is compatible with a renal cell carcinoma. A paraaortic lymph node situated immediately posterior to the left renal vein measures 1.1 cm in short axis, suspicious for metastasis. 2. A 2 cm right external iliac lymph node is also suspicious for metastases. 3. Multiple sclerotic osseous lesions suspicious for metastases. These include 0.8 and 0.6 cm sclerotic lesions laterally in the right iliac wing (images 53 and 62 respectively). Ill-defined groundglass density laterally in the right acetabulum measuring 1.7 x 1.2 cm corresponds with the lesion identified on MRI. A 0.4 cm groundglass density in the left acetabulum. Sclerotic lesion in the left femoral neck measures 0.9 x 1.6 cm (image 81). Sclerotic metastases would be more compatible " "with prostate metastases. 4. A 3 subcentimeter hepatic lesions are indeterminate. Metastases would be a consideration. These can be further characterized with liver MRI.\"  - 1/25/2019: NM bone scan - \"There is focal bony uptake in the left femoral neck, right acetabulum, right of midline at the S1 or L5 level of the spine, within multiple bilateral ribs, in the C7 or T1 level of the spine, and anteriorly within the skull. Findings are suspicious for metastases.\"  - 1/31/19: CT chest with contrast - \" No suspicious nodules in the chest.  Stable appearance of a 5.8 cm right renal mass concerning for renal carcinoma until proven otherwise.  Stable indeterminant subcentimeter hypodensities in the liver.  Multifocal osteoblastic metastasis including 2.5 cm lesion in the right fifth rib posteriorly and a 1.6 cm lesion in the right third rib posteriorly. No lytic lesions identified.\"  - 2/6/19: CT-guided right sclerotic fifth rib lesion biopsy - \"Metastatic carcinoma, consistent with prostate primary.  Immunohistochemical stains performed show the metastatic carcinoma stains positive for NKX3.1 (prostate marker), negative for STACIE 3 and PAX8, which supports the above diagnosis.\"  - 2/15/19: Started Casodex 50mg every day and consented for the biobanking protocol. 3/18/19 - stopped Casodex.  - 2/19/19: Case discussed in tumor board - recommendation for nephectomy for suspected malignant right renal mass.   - 2/26/19: Started Lupron 22.5mg every 3 months.   - 5/8/19: Started Docetaxel 75mg/m2 IV every 3 weeks. 06/18/19 - cycle 3, 7/10/19 - cycle 4, 07/31/19 - cycle 5, 08/21/19 - cycle 6.   - 10/2/19: Restaging CT CAP and NM Bone Scan showed improved osseous disease, no evidence of recurrent or new metastatic disease.  - 1/5/20: Restaging CT CAP and NM Bone Scan showed stable osseous disease, no evidence of recurrent or new metastatic disease.  - 4/6/20: NM bone scan with slightly improved uptake in known skeletal mets. CT " "C/A/P with contrast with stable osseous mets, no yusuf enlargement, no new visceral mets etc.  - 04/08/20: Zometa every 3 months.  - 10/5/20: CT C/A/P with contrast and NM bone scan - SD.   - 1/4/21: CT C/A/P with contrast and NM bone scan - SD but slight increase in right 5th rib tracer uptake and in posterior L5 sclerosis.   - 03/29/21: CT C/A/P with contrast - single new right sacral 8mm bone lesion (suspicious), other bone mets stable. No visceral/yusuf disease. Nephrectomy site without recurrence. NM bone scan - SD but \"increased uptake associated with the prior lesions of the lower  cervical spine, posterior right fourth rib and right L5 posterior arch elements. Otherwise unchanged uptake associated with the proximal left femur and left anterior fourth rib. Similar uptake of the left halux, possibly secondary to degenerative osteoarthritis or gout.\"      Radiation history:   C7         3,000 cGy       06 X      8/05/2021        8/18/2021        13       10  2 Sacrum          2,500 cGy       18 X      4/12/2022        4/18/2022         6               Sacrum retreat               700 cGy        18 X      2/28/2023        2/28/2023    2. Stage III ([id number]), grade 3 of 4, clear-cell RCC of right kidney:  - Incidentally diagnosed as above.   - Underwent curative-intent robotic right radical nephrectomy with Dr. Wesley Cleveland on 3/19/19. No tumor spillage per op report.   - Path showed: \"Histologic Type: Clear cell renal cell carcinoma; Sarcomatoid Features: Not identified; Histologic Grade: Nucleolar grade 3 (WHO/ISUP). Extent Tumor Size: 4.3 cm. Microscopic Tumor Extension: Tumor extension into renal sinus (in vascular structures). Margins: Negative. Tumor Necrosis: Present; focal. Lymph-Vascular Invasion: Not identified.  Pathologic Staging (pTNM) Primary Tumor (pT): pT3a: Tumor extends into renal vein branches/renal sinus. Regional Lymph Nodes (pN): pNX. Number of Lymph Nodes Examined: 0 Distant Metastasis " "(pM): pM N/A.\"  - Restaging scans as above.    INTERVAL HISTORY:   Walter is here for a pre-pluvicto appointment.   First one went ok, nausea and fatigue   7th day he turned a corner and returned to his baseline  Having good relief pain-wise, until yesterday, needed more tylenol  Overall 3/10, left leg/sacram pain  Working with palliative     PAST MEDICAL HISTORY:  Past Medical History:   Diagnosis Date     Cancer of kidney, right (H)      Complication of anesthesia     slow wakeup      Malignant neoplasm of prostate metastatic to bone (H) 2/14/2019     Thyroid disease      PAST SURGICAL HISTORY:   Past Surgical History:   Procedure Laterality Date     CYSTOSCOPY      about 10 years ago     INSERT PORT VASCULAR ACCESS Right 5/2/2019    Procedure: Chest Port Placement;  Surgeon: Tate Burciaga PA-C;  Location: [facility]     IR CHEST PORT PLACEMENT > 5 YRS OF AGE  5/2/2019     LAPAROSCOPIC NEPHRECTOMY Right 3/19/2019    Procedure: Right laparoscopic radical nephrectomy;  Surgeon: Wesley Cleveland MD;  Location: [facility] OR      SOCIAL HISTORY:   Social History     Tobacco Use     Smoking status: Never     Smokeless tobacco: Never   Substance Use Topics     Drug use: No     FAMILY HISTORY:   Family History   Problem Relation Age of Onset     Diabetes Father      ALLERGIES:   Allergies   Allergen Reactions     Amlodipine Other (See Comments)     Leg swelling     CURRENT MEDICATIONS:   Current Outpatient Medications:      atorvastatin (LIPITOR) 20 MG tablet, Take 60 mg by mouth daily , Disp: , Rfl:      buprenorphine (BUTRANS) 20 MCG/HR WK patch, Place 1 patch onto the skin every 7 days, Disp: 4 patch, Rfl: 0     calcium citrate-vitamin D (CITRACAL) 315-250 MG-UNIT TABS per tablet, Take 650 mg by mouth 2 times daily , Disp: , Rfl:      cycloSPORINE (RESTASIS) 0.05 % ophthalmic emulsion, Place 1 drop into both eyes 2 times daily , Disp: , Rfl:      dexamethasone (DECADRON) 1 MG tablet, Take 0.5 tablets (0.5 mg) " by mouth 2 times daily (with meals) for 14 days, Disp: 14 tablet, Rfl: 0     fluconazole (DIFLUCAN) 200 MG tablet, Take 1 tablet (200 mg) by mouth daily, Disp: 60 tablet, Rfl: 1     gabapentin (NEURONTIN) 300 MG capsule, Take 1 capsule (300 mg) by mouth 3 times daily May take 600 mg for evening, 8 PM, dose., Disp: 90 capsule, Rfl: 1     Glucosamine-Chondroit-Vit C-Mn (GLUCOSAMINE 1500 COMPLEX) CAPS, Take 1 capsule by mouth daily, Disp: , Rfl:      levothyroxine (SYNTHROID/LEVOTHROID) 112 MCG tablet, Take 112 mcg by mouth daily, Disp: , Rfl:      loratadine (CLARITIN) 10 MG tablet, , Disp: , Rfl:      Multiple Vitamins-Minerals (MULTIVITAMIN ADULT EXTRA C PO), Take 1 tablet by mouth every 24 hours, Disp: , Rfl:      Nutritional Supplements (SALMON OIL) CAPS, Take 2 capsules by mouth daily , Disp: , Rfl:      omeprazole (PRILOSEC) 20 MG DR capsule, Take 20 mg by mouth daily, Disp: , Rfl:      oxyCODONE (ROXICODONE) 5 MG tablet, Take 1-2 tablets (5-10 mg) by mouth every 4 hours as needed for pain, Disp: 40 tablet, Rfl: 0     tamsulosin (FLOMAX) 0.4 MG capsule, Take 1 capsule (0.4 mg) by mouth daily, Disp: 30 capsule, Rfl: 3     triamterene-HCTZ (MAXZIDE-25) 37.5-25 MG tablet, Take 1 tablet by mouth daily, Disp: , Rfl:     Physical Exam:   Video physical exam  General: Patient appears well in no acute distress.   Skin: No visualized rash or lesions on visualized skin  Eyes: EOMI, no erythema, sclera icterus or discharge noted  Resp: Appears to be breathing comfortably without accessory muscle usage, speaking in full sentences, no cough  MSK: Appears to have normal range of motion based on visualized movements  Neurologic: No apparent tremors, facial movements symmetric  Psych: affect bright, alert and oriented      ECOG PS 1.    LABORATORY DATA:  Most Recent 3 CBC's:  Recent Labs   Lab Test 07/18/23  0818 06/07/23  0852 04/21/23  0810   WBC 3.9* 5.7 5.3   HGB 12.1* 12.4* 12.0*   MCV 89 92 89    245 199  "  ANEUTAUTO 2.2 3.3 3.1     Most Recent 3 BMP's:  Recent Labs   Lab Test 07/18/23  0818 06/07/23  0852 04/21/23  0810    137 140   POTASSIUM 4.0 4.2 4.0   CHLORIDE 102 101 105   CO2 25 25 24   BUN 18.4 21.3 16.9   CR 0.93 1.10 1.04   ANIONGAP 11 11 11   BETHANY 8.9 9.2 9.2   * 98 104*   PROTTOTAL 6.6 6.9 6.7   ALBUMIN 4.2 4.3 4.1    Most Recent 3 LFT's:  Recent Labs   Lab Test 07/18/23  0818 06/07/23  0852 04/21/23  0810   AST 28 23 19   ALT 22 25 19   ALKPHOS 114 95 67   BILITOTAL 0.2 0.2 0.2    Most Recent 2 TSH and T4:  Recent Labs   Lab Test 07/07/21  0834 03/31/21  0824   TSH 1.67 1.63       I reviewed the above labs today.      12/6/22 repeat MRI  IMPRESSION:   1. Decreased enhancement within the C7 metastasis with decreased  epidural enhancement, consistent with response to therapy.  2. Radiation changes within the adjacent vertebrae.  3. No new metastases.  4. Mild degenerative change.  5. Small mass within the left deep lobe of the left parotid gland with  fluid-fluid level which may represent primary parotid tumor.  6. Slight fullness within the left vallecula which presumably  represents submucosal cyst. Direct visualization could be considered.        ASSESSMENT & PLAN: Mr. Reyes is a delightful 63 year old gentleman with metastatic castration sensitive prostate adenocarcinoma as well as an incidentally diagnosed stage III clear-cell renal cell carcinoma of the right kidney (s/p radical R nephrectomy 3/19/19), who is here for a follow-up visit and initiation of docetafor now metastatic castration-resistant prostate cancer.     1. Metastatic castration-resistant prostate cancer, with involvement of the bones and RPLN:   - Patient met the criteria for CHAARTED \"high-volume\" metastatic hormone-sensitive prostate cancer. He opted for docetaxel in combination with ADT (per JOSEFA, [id number], STAMPEDE).     Waltre has now been off chemotherapy since October 2022.  Last PSA was a decline " compared to the previous.  PSMA PET scan Nov 2022 reveals persistent yusuf and bony disease that is evident as well as evidence of a lesion in C7.     --PSA stable s/p pluvicto cycle 1.  Proceed with cycle 2 Monday  --establish with Dr. Mcnair prior to cycle 3    --Lupron due next August 2023.      2. Bone metastases:   - Noted to have osseous metastatic disease at the time of diagnosis. S/p radiation to C spine and sacrum (Re-irradiation to sacrum).   - worsening radiculopathy down starting in the low back and radiating down the left leg in June. MRI read was without new/acute abnormalities  - Pain continues to fluctuate but overall well controlled/managable   - pain mgmt per palliative care, on butrans patch now  - Encouraged to continue calcium and vitamin D supplementation; continue Zometa 4mg IV every ~3 months. Due next in August 2023.        3. Stage 3, UISS-high risk ccRCC: s/p R nephrectomy   - approximately 40-50% risk of recurrence after definitive surgery.   - no clear evidence of residual disease at this time; the retroperitoneal lymphadenopathy is more consistent with metastatic prostate cancer   - Continue active surveillance with self-reporting for signs/symptoms of recurrence (this was previously explained in detail) and periodic H&P and scans.      30 minutes spent on the date of the encounter doing chart review, review of test results, interpretation of tests, patient visit and documentation     Veda Greenwood CNP on 7/21/2023 at 8:07 AM                          Again, thank you for allowing me to participate in the care of your patient.        Sincerely,        Veda Greenwood CNP

## 2023-07-25 ENCOUNTER — HOSPITAL ENCOUNTER (OUTPATIENT)
Dept: NUCLEAR MEDICINE | Facility: CLINIC | Age: 63
Setting detail: NUCLEAR MEDICINE
Discharge: HOME | End: 2023-07-25
Attending: INTERNAL MEDICINE
Payer: COMMERCIAL

## 2023-07-25 VITALS
OXYGEN SATURATION: 96 % | DIASTOLIC BLOOD PRESSURE: 80 MMHG | TEMPERATURE: 97.1 F | SYSTOLIC BLOOD PRESSURE: 143 MMHG | RESPIRATION RATE: 16 BRPM | HEART RATE: 65 BPM

## 2023-07-25 DIAGNOSIS — C61 PROSTATE CANCER (H): Primary | ICD-10-CM

## 2023-07-25 DIAGNOSIS — C61 MALIGNANT NEOPLASM OF PROSTATE METASTATIC TO BONE (H): ICD-10-CM

## 2023-07-25 DIAGNOSIS — C79.51 MALIGNANT NEOPLASM OF PROSTATE METASTATIC TO BONE (H): ICD-10-CM

## 2023-07-25 PROCEDURE — 79101 NUCLEAR RX IV ADMIN: CPT

## 2023-07-25 PROCEDURE — 344N000001 HC RX 344: Performed by: INTERNAL MEDICINE

## 2023-07-25 PROCEDURE — 79101 NUCLEAR RX IV ADMIN: CPT | Mod: 26 | Performed by: RADIOLOGY

## 2023-07-25 PROCEDURE — A9607 HC RX 344: HCPCS | Performed by: INTERNAL MEDICINE

## 2023-07-25 RX ORDER — EPINEPHRINE 1 MG/ML
0.3 INJECTION, SOLUTION, CONCENTRATE INTRAVENOUS EVERY 5 MIN PRN
Status: DISCONTINUED | OUTPATIENT
Start: 2023-07-25 | End: 2023-07-26 | Stop reason: HOSPADM

## 2023-07-25 RX ORDER — LORAZEPAM 2 MG/ML
.5-1 INJECTION INTRAMUSCULAR EVERY 6 HOURS PRN
Status: DISCONTINUED | OUTPATIENT
Start: 2023-07-25 | End: 2023-07-26 | Stop reason: HOSPADM

## 2023-07-25 RX ORDER — DIPHENHYDRAMINE HYDROCHLORIDE 50 MG/ML
50 INJECTION INTRAMUSCULAR; INTRAVENOUS
Status: DISCONTINUED | OUTPATIENT
Start: 2023-07-25 | End: 2023-07-26 | Stop reason: HOSPADM

## 2023-07-25 RX ORDER — METHYLPREDNISOLONE SODIUM SUCCINATE 125 MG/2ML
125 INJECTION, POWDER, LYOPHILIZED, FOR SOLUTION INTRAMUSCULAR; INTRAVENOUS
Status: DISCONTINUED | OUTPATIENT
Start: 2023-07-25 | End: 2023-07-26 | Stop reason: HOSPADM

## 2023-07-25 RX ORDER — PROCHLORPERAZINE MALEATE 10 MG
10 TABLET ORAL EVERY 6 HOURS PRN
Status: DISCONTINUED | OUTPATIENT
Start: 2023-07-25 | End: 2023-07-26 | Stop reason: HOSPADM

## 2023-07-25 RX ORDER — LORAZEPAM 0.5 MG/1
.5-1 TABLET ORAL EVERY 6 HOURS PRN
Status: DISCONTINUED | OUTPATIENT
Start: 2023-07-25 | End: 2023-07-26 | Stop reason: HOSPADM

## 2023-07-25 RX ORDER — ALBUTEROL SULFATE 90 UG/1
1-2 AEROSOL, METERED RESPIRATORY (INHALATION)
Status: DISCONTINUED | OUTPATIENT
Start: 2023-07-25 | End: 2023-07-26 | Stop reason: HOSPADM

## 2023-07-25 RX ORDER — ALBUTEROL SULFATE 0.83 MG/ML
2.5 SOLUTION RESPIRATORY (INHALATION)
Status: DISCONTINUED | OUTPATIENT
Start: 2023-07-25 | End: 2023-07-26 | Stop reason: HOSPADM

## 2023-07-25 RX ORDER — MEPERIDINE HYDROCHLORIDE 25 MG/ML
25 INJECTION INTRAMUSCULAR; INTRAVENOUS; SUBCUTANEOUS EVERY 30 MIN PRN
Status: DISCONTINUED | OUTPATIENT
Start: 2023-07-25 | End: 2023-07-26 | Stop reason: HOSPADM

## 2023-07-25 RX ADMIN — LUTETIUM LU 177 VIPIVOTIDE TETRAXETAN 200 MILLICURIE: 27 INJECTION, SOLUTION INTRAVENOUS at 09:20

## 2023-07-25 NOTE — PROGRESS NOTES
[facility] Nursing Note:    Patient presents today for Pluvitco therapy, dose 2 of  6  Patient seen by provider today: No   present during visit today: NOT APPLICABLE      Intravenous Access:  Peripheral IV placed     Treatment Conditions:  Labs done on 07/18/23    Results reviewed, labs Met treatment parameters, ok to proceed with treatment.           Post Infusion Assessment:  Patient tolerated infusion without incident.    PIV - No evidence of extravasations, access discontinued per protocol.         Discharge Plan:   Patient will return as scheduled for next appointment.   Patient discharged at 1053a  in stable condition accompanied by: Family  Departure Mode: Ambulatory

## 2023-07-28 DIAGNOSIS — C61 PROSTATE CANCER (H): ICD-10-CM

## 2023-07-28 DIAGNOSIS — M54.10 RADICULOPATHY, UNSPECIFIED SPINAL REGION: ICD-10-CM

## 2023-07-28 RX ORDER — GABAPENTIN 300 MG/1
CAPSULE ORAL
Qty: 120 CAPSULE | Refills: 1 | Status: SHIPPED | OUTPATIENT
Start: 2023-07-28 | End: 2023-09-25

## 2023-07-28 NOTE — TELEPHONE ENCOUNTER
Received [employer] message from patient requesting refill of gabapentin.     Last refill: 7/5/23 (Dose adjustment made since last fill)  Last office visit: 7/7/23  Scheduled for follow up 8/2/23     Will route request to NP for review.     Reviewed [place]  Report.

## 2023-07-31 DIAGNOSIS — C61 PROSTATE CANCER (H): ICD-10-CM

## 2023-07-31 DIAGNOSIS — M54.10 RADICULOPATHY, UNSPECIFIED SPINAL REGION: ICD-10-CM

## 2023-07-31 RX ORDER — BUPRENORPHINE 20 UG/H
1 PATCH TRANSDERMAL
Qty: 4 PATCH | Refills: 0 | Status: SHIPPED | OUTPATIENT
Start: 2023-07-31 | End: 2023-09-05

## 2023-07-31 NOTE — TELEPHONE ENCOUNTER
Received [employer] 8t message from patient requesting refill of butrans patch.     Last refill: 7/12/23  Last office visit: 7/7/23  Scheduled for follow up 8/2/23     Will route request to NP for review.     Reviewed [place]  Report.

## 2023-08-02 ENCOUNTER — VIRTUAL VISIT (OUTPATIENT)
Dept: PALLIATIVE MEDICINE | Facility: CLINIC | Age: 63
End: 2023-08-02
Payer: COMMERCIAL

## 2023-08-02 VITALS
SYSTOLIC BLOOD PRESSURE: 111 MMHG | WEIGHT: 260 LBS | HEIGHT: 72 IN | DIASTOLIC BLOOD PRESSURE: 86 MMHG | BODY MASS INDEX: 35.21 KG/M2

## 2023-08-02 DIAGNOSIS — C61 PROSTATE CANCER (H): ICD-10-CM

## 2023-08-02 DIAGNOSIS — M54.10 RADICULOPATHY, UNSPECIFIED SPINAL REGION: ICD-10-CM

## 2023-08-02 DIAGNOSIS — G89.3 CANCER ASSOCIATED PAIN: Primary | ICD-10-CM

## 2023-08-02 PROCEDURE — 99215 OFFICE O/P EST HI 40 MIN: CPT | Mod: VID | Performed by: NURSE PRACTITIONER

## 2023-08-02 RX ORDER — OXYCODONE HYDROCHLORIDE 5 MG/1
5-10 TABLET ORAL EVERY 4 HOURS PRN
Qty: 40 TABLET | Refills: 0 | Status: SHIPPED | OUTPATIENT
Start: 2023-08-02 | End: 2023-09-28

## 2023-08-02 ASSESSMENT — PAIN SCALES - GENERAL: PAINLEVEL: MILD PAIN (2)

## 2023-08-02 NOTE — NURSING NOTE
Is the patient currently in the state of [place]? YES    Visit mode:VIDEO    If the visit is dropped, the patient can be reconnected by: VIDEO VISIT: Send to e-mail at: [email or web address]    Will anyone else be joining the visit? NO      How would you like to obtain your AVS? MyChart    Are changes needed to the allergy or medication list? NO    Patient declined individual allergy and medication review by support staff because pt reviewed during echeck in.    Maribel FRANK    Reason for visit: RECHECK

## 2023-08-02 NOTE — PROGRESS NOTES
Virtual Visit Details    Type of service:  Video Visit   Video start: 10:12 AM  Video stop: 10:37 AM    Originating Location (pt. Location): Home    Distant Location (provider location):  On-site  Platform used for Video Visit: [facility]      Palliative Care Outpatient Clinic      Patient ID/Chief Complaint: Walter Reyes 63 year old male who is presenting to the palliative medicine clinic today at the request of Yamilet Espinoza PA-C for a palliative care follow-up secondary to metastatic prostate cancer.   The patient's primary care provider is:  Poncho Ortiz       Impression & Recommendations:  63-year-old male with metastatic prostate cancer with spinal and bone metastases.  Palliative care assistance requested for symptom management of chronic cancer associated back pain with radiculopathy in the legs.    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  High risk for recurrence.    Past medical history also includes hyperlipidemia, hypothyroidism, HTN, GERD, Acosta's neuroma, plantar fasciitis, peroneal tendinitis, and mild chemotherapy-induced polyneuropathy.    Radiculopathy in both legs, has improved on gabapentin.  He has constant pain over the sacrum and to the left of the sacrum.  Pain is constant, deep, aching.  Interferes with sleep. Pain worsens when reclining or lying down.    Symptoms/recommendations/discussion:  Continue Butrans 20 mcg/h weekly patch---reports good improvement in pain  Continue gabapentin 300 mg every morning and afternoon, 600 mg at bedtime (he may decrease bedtime dose to 300 mg for morning somnolence).  Continue oxycodone IR 5-10 mg every 4 hours as needed, prescription sent  Counseled to monitor for constipation  Counseled opioid safety, Narcan nasal spray ordered  Social situation includes residing in home with wife Micheline. Stable housing and food. No h/o substance abuse.   Palliative follow-up within  the next 6 to 8 weeks        How to get a hold of us:  For non-urgent matters, MyChart works best.    For more urgent matters, or if you prefer not to use MyChart, call our clinic nurse coordinator Sherry Palma RN at [phone] or [phone]    We have an on-call number for evenings and weekends. Please call this only if you are having uncontrolled symptoms or serious side effects from your medicines: [phone].     For refills, please give us a week (5 working days) notice. We don't always have providers available everyday to do refills. If you call the day you run out of your medicine, we may not be able to refill it in time, so call 5 days in advance        History:  History gathered today from: patient, family/loved ones, medical chart, outside records including Care Everywhere      PE: /86   Ht 1.829 m (6')   Wt 117.9 kg (260 lb)   BMI 35.26 kg/m     Wt Readings from Last 3 Encounters:   08/02/23 117.9 kg (260 lb)   07/21/23 118.8 kg (262 lb)   07/07/23 117.9 kg (260 lb)       Gen alert, comfortable appearing, NAD.   Head NCAT.  Eyes anicteric without injection  Face symmetric, eyes conjugate  Lungs unlabored, no cough, speaking full sentences  Skin no rashes or lesions evident on face/neck  Neuro Face symmetric, eyes conjugate; speech fluent.  Neuropsych exam normal including affect, sensorium, gross memory, thought processes, and fund of knowledge.         Data reviewed:  I reviewed electrolytes, BUN/creatinine, liver profile, hemoglobin and hematocrit, platelet count, and most recent imaging  Recent oncology notes     database reviewed: 8/2/2023:        45 minutes spent on the date of the encounter doing chart review, history and exam, patient education & counseling, documentation and other activities as noted above.        Thank you for involving us in the patient's care.   LATESHA Seaman, [facility] Palliative Care Service

## 2023-08-03 ENCOUNTER — INFUSION THERAPY VISIT (OUTPATIENT)
Dept: INFUSION THERAPY | Facility: CLINIC | Age: 63
End: 2023-08-03
Attending: INTERNAL MEDICINE
Payer: COMMERCIAL

## 2023-08-03 VITALS — DIASTOLIC BLOOD PRESSURE: 82 MMHG | HEART RATE: 57 BPM | OXYGEN SATURATION: 97 % | SYSTOLIC BLOOD PRESSURE: 148 MMHG

## 2023-08-03 DIAGNOSIS — C79.51 MALIGNANT NEOPLASM OF PROSTATE METASTATIC TO BONE (H): Primary | ICD-10-CM

## 2023-08-03 DIAGNOSIS — C61 MALIGNANT NEOPLASM OF PROSTATE METASTATIC TO BONE (H): Primary | ICD-10-CM

## 2023-08-03 PROCEDURE — 250N000011 HC RX IP 250 OP 636: Mod: JZ | Performed by: INTERNAL MEDICINE

## 2023-08-03 PROCEDURE — 96374 THER/PROPH/DIAG INJ IV PUSH: CPT

## 2023-08-03 PROCEDURE — 250N000011 HC RX IP 250 OP 636: Mod: JZ | Performed by: NURSE PRACTITIONER

## 2023-08-03 PROCEDURE — 96402 CHEMO HORMON ANTINEOPL SQ/IM: CPT

## 2023-08-03 RX ORDER — HEPARIN SODIUM (PORCINE) LOCK FLUSH IV SOLN 100 UNIT/ML 100 UNIT/ML
5 SOLUTION INTRAVENOUS
Status: CANCELLED | OUTPATIENT
Start: 2023-08-07

## 2023-08-03 RX ORDER — HEPARIN SODIUM,PORCINE 10 UNIT/ML
5 VIAL (ML) INTRAVENOUS
Status: CANCELLED | OUTPATIENT
Start: 2023-08-07

## 2023-08-03 RX ORDER — ZOLEDRONIC ACID 0.04 MG/ML
4 INJECTION, SOLUTION INTRAVENOUS ONCE
Status: CANCELLED | OUTPATIENT
Start: 2023-08-07 | End: 2023-08-07

## 2023-08-03 RX ORDER — HEPARIN SODIUM (PORCINE) LOCK FLUSH IV SOLN 100 UNIT/ML 100 UNIT/ML
5 SOLUTION INTRAVENOUS
Status: DISCONTINUED | OUTPATIENT
Start: 2023-08-03 | End: 2023-08-03 | Stop reason: HOSPADM

## 2023-08-03 RX ORDER — ZOLEDRONIC ACID 0.04 MG/ML
4 INJECTION, SOLUTION INTRAVENOUS ONCE
Status: COMPLETED | OUTPATIENT
Start: 2023-08-03 | End: 2023-08-03

## 2023-08-03 RX ADMIN — LEUPROLIDE ACETATE 22.5 MG: KIT at 15:18

## 2023-08-03 RX ADMIN — ZOLEDRONIC ACID 4 MG: 0.04 INJECTION, SOLUTION INTRAVENOUS at 15:02

## 2023-08-03 NOTE — PROGRESS NOTES
Infusion Nursing Note:  Walter PUENTES Leobardo Melisa presents today for Q 90 day Zometa/Lupron.    Patient seen by provider today: No   present during visit today: Not Applicable.    Note: Pt verifies he is taking Ca/Vit D as prescribed. No upcoming invasive dental procedures.      Intravenous Access:  No Intravenous access/labs at this visit.  Peripheral IV placed.    Treatment Conditions: Labs noted 7/18/23  Results reviewed, labs MET treatment parameters, ok to proceed with treatment.  Ca 8.9  Creat 0.93    Post Infusion Assessment:  Patient tolerated infusion without incident.  Patient tolerated injection without incident.  Blood return noted pre and post infusion.  Site patent and intact, free from redness, edema or discomfort.  No evidence of extravasations.  Access discontinued per protocol.       Discharge Plan:   [facility]  Patient will return [facility]/28/[facility] to [facility] for labs/RC for next appointment.   Patient discharged in stable condition accompanied by: self.  Departure Mode: Ambulatory.      Cyndy Alexis RN

## 2023-08-25 ENCOUNTER — HOSPITAL ENCOUNTER (EMERGENCY)
Facility: CLINIC | Age: 63
Discharge: HOME | End: 2023-08-25
Attending: EMERGENCY MEDICINE | Admitting: EMERGENCY MEDICINE
Payer: COMMERCIAL

## 2023-08-25 ENCOUNTER — APPOINTMENT (OUTPATIENT)
Dept: GENERAL RADIOLOGY | Facility: CLINIC | Age: 63
End: 2023-08-25
Attending: EMERGENCY MEDICINE
Payer: COMMERCIAL

## 2023-08-25 ENCOUNTER — NURSE TRIAGE (OUTPATIENT)
Dept: ONCOLOGY | Facility: CLINIC | Age: 63
End: 2023-08-25

## 2023-08-25 VITALS
RESPIRATION RATE: 16 BRPM | HEART RATE: 64 BPM | DIASTOLIC BLOOD PRESSURE: 90 MMHG | HEIGHT: 72 IN | OXYGEN SATURATION: 93 % | TEMPERATURE: 97.1 F | BODY MASS INDEX: 36.31 KG/M2 | SYSTOLIC BLOOD PRESSURE: 136 MMHG | WEIGHT: 268.08 LBS

## 2023-08-25 DIAGNOSIS — C79.51 MALIGNANT NEOPLASM OF PROSTATE METASTATIC TO BONE (H): ICD-10-CM

## 2023-08-25 DIAGNOSIS — C61 MALIGNANT NEOPLASM OF PROSTATE METASTATIC TO BONE (H): ICD-10-CM

## 2023-08-25 DIAGNOSIS — M89.8X5 PAIN OF LEFT FEMUR: ICD-10-CM

## 2023-08-25 LAB
ANION GAP SERPL CALCULATED.3IONS-SCNC: 14 MMOL/L (ref 7–15)
BASOPHILS # BLD AUTO: 0 10E3/UL (ref 0–0.2)
BASOPHILS NFR BLD AUTO: 0 %
BUN SERPL-MCNC: 17 MG/DL (ref 8–23)
CA-I BLD-MCNC: 4.6 MG/DL (ref 4.4–5.2)
CALCIUM SERPL-MCNC: 9.3 MG/DL (ref 8.8–10.2)
CHLORIDE SERPL-SCNC: 100 MMOL/L (ref 98–107)
CREAT SERPL-MCNC: 1.05 MG/DL (ref 0.67–1.17)
D DIMER PPP FEU-MCNC: 0.52 UG/ML FEU (ref 0–0.5)
DEPRECATED HCO3 PLAS-SCNC: 25 MMOL/L (ref 22–29)
EOSINOPHIL # BLD AUTO: 0.1 10E3/UL (ref 0–0.7)
EOSINOPHIL NFR BLD AUTO: 1 %
ERYTHROCYTE [DISTWIDTH] IN BLOOD BY AUTOMATED COUNT: 13.9 % (ref 10–15)
GFR SERPL CREATININE-BSD FRML MDRD: 81 ML/MIN/1.73M2
GLUCOSE SERPL-MCNC: 106 MG/DL (ref 70–99)
HCT VFR BLD AUTO: 35.7 % (ref 40–53)
HGB BLD-MCNC: 11.8 G/DL (ref 13.3–17.7)
IMM GRANULOCYTES # BLD: 0 10E3/UL
IMM GRANULOCYTES NFR BLD: 0 %
LYMPHOCYTES # BLD AUTO: 1 10E3/UL (ref 0.8–5.3)
LYMPHOCYTES NFR BLD AUTO: 16 %
MAGNESIUM SERPL-MCNC: 1.9 MG/DL (ref 1.7–2.3)
MCH RBC QN AUTO: 29.7 PG (ref 26.5–33)
MCHC RBC AUTO-ENTMCNC: 33.1 G/DL (ref 31.5–36.5)
MCV RBC AUTO: 90 FL (ref 78–100)
MONOCYTES # BLD AUTO: 0.6 10E3/UL (ref 0–1.3)
MONOCYTES NFR BLD AUTO: 10 %
NEUTROPHILS # BLD AUTO: 4.4 10E3/UL (ref 1.6–8.3)
NEUTROPHILS NFR BLD AUTO: 73 %
NRBC # BLD AUTO: 0 10E3/UL
NRBC BLD AUTO-RTO: 0 /100
PLATELET # BLD AUTO: 220 10E3/UL (ref 150–450)
POTASSIUM SERPL-SCNC: 4 MMOL/L (ref 3.4–5.3)
RBC # BLD AUTO: 3.97 10E6/UL (ref 4.4–5.9)
SODIUM SERPL-SCNC: 139 MMOL/L (ref 136–145)
WBC # BLD AUTO: 6.1 10E3/UL (ref 4–11)

## 2023-08-25 PROCEDURE — 73552 X-RAY EXAM OF FEMUR 2/>: CPT | Mod: LT

## 2023-08-25 PROCEDURE — 73562 X-RAY EXAM OF KNEE 3: CPT | Mod: LT

## 2023-08-25 PROCEDURE — 36415 COLL VENOUS BLD VENIPUNCTURE: CPT | Performed by: EMERGENCY MEDICINE

## 2023-08-25 PROCEDURE — 250N000011 HC RX IP 250 OP 636: Mod: JZ | Performed by: EMERGENCY MEDICINE

## 2023-08-25 PROCEDURE — 84153 ASSAY OF PSA TOTAL: CPT

## 2023-08-25 PROCEDURE — 83735 ASSAY OF MAGNESIUM: CPT | Performed by: EMERGENCY MEDICINE

## 2023-08-25 PROCEDURE — 85379 FIBRIN DEGRADATION QUANT: CPT | Performed by: EMERGENCY MEDICINE

## 2023-08-25 PROCEDURE — 82310 ASSAY OF CALCIUM: CPT | Performed by: EMERGENCY MEDICINE

## 2023-08-25 PROCEDURE — 99284 EMERGENCY DEPT VISIT MOD MDM: CPT | Mod: 25

## 2023-08-25 PROCEDURE — 96374 THER/PROPH/DIAG INJ IV PUSH: CPT

## 2023-08-25 PROCEDURE — 250N000013 HC RX MED GY IP 250 OP 250 PS 637: Performed by: EMERGENCY MEDICINE

## 2023-08-25 PROCEDURE — 85014 HEMATOCRIT: CPT | Performed by: EMERGENCY MEDICINE

## 2023-08-25 PROCEDURE — 82330 ASSAY OF CALCIUM: CPT | Performed by: EMERGENCY MEDICINE

## 2023-08-25 RX ORDER — DEXAMETHASONE SODIUM PHOSPHATE 4 MG/ML
4 INJECTION, SOLUTION INTRA-ARTICULAR; INTRALESIONAL; INTRAMUSCULAR; INTRAVENOUS; SOFT TISSUE ONCE
Status: COMPLETED | OUTPATIENT
Start: 2023-08-25 | End: 2023-08-25

## 2023-08-25 RX ORDER — HYDROMORPHONE HYDROCHLORIDE 2 MG/1
2 TABLET ORAL EVERY 6 HOURS PRN
Qty: 10 TABLET | Refills: 0 | Status: SHIPPED | OUTPATIENT
Start: 2023-08-25 | End: 2023-08-28

## 2023-08-25 RX ORDER — HYDROMORPHONE HYDROCHLORIDE 2 MG/1
2 TABLET ORAL ONCE
Status: COMPLETED | OUTPATIENT
Start: 2023-08-25 | End: 2023-08-25

## 2023-08-25 RX ORDER — DEXAMETHASONE 4 MG/1
4 TABLET ORAL DAILY
Qty: 7 TABLET | Refills: 0 | Status: SHIPPED | OUTPATIENT
Start: 2023-08-25 | End: 2023-09-08

## 2023-08-25 RX ADMIN — DEXAMETHASONE SODIUM PHOSPHATE 4 MG: 4 INJECTION, SOLUTION INTRAMUSCULAR; INTRAVENOUS at 12:26

## 2023-08-25 RX ADMIN — HYDROMORPHONE HYDROCHLORIDE 2 MG: 2 TABLET ORAL at 12:27

## 2023-08-25 ASSESSMENT — ACTIVITIES OF DAILY LIVING (ADL)
ADLS_ACUITY_SCORE: 37

## 2023-08-25 NOTE — ED PROVIDER NOTES
History     Chief Complaint:  Knee Pain       HPI   Walter Reyes is a 63 year old male who presents with worsening leg pain.  Patient has a history of metastatic prostate cancer currently on medication pain management includes Butrans patch.  Patient's had worsening pain in his left leg.  He has noticed difficulty getting up.  Radiates from his knee up towards his hip.  He denies fever or chills or fall or trauma and presents the emergency room for assessment.      Independent Historian:   None - Patient Only    Review of External Notes:   Prior imaging         Medications:    atorvastatin (LIPITOR) 20 MG tablet  buprenorphine (BUTRANS) 20 MCG/HR WK patch  calcium citrate-vitamin D (CITRACAL) 315-250 MG-UNIT TABS per tablet  cycloSPORINE (RESTASIS) 0.05 % ophthalmic emulsion  fluconazole (DIFLUCAN) 200 MG tablet  gabapentin (NEURONTIN) 300 MG capsule  Glucosamine-Chondroit-Vit C-Mn (GLUCOSAMINE 1500 COMPLEX) CAPS  levothyroxine (SYNTHROID/LEVOTHROID) 112 MCG tablet  loratadine (CLARITIN) 10 MG tablet  Multiple Vitamins-Minerals (MULTIVITAMIN ADULT EXTRA C PO)  naloxone (NARCAN) 4 MG/0.1ML nasal spray  Nutritional Supplements (SALMON OIL) CAPS  omeprazole (PRILOSEC) 20 MG DR capsule  oxyCODONE (ROXICODONE) 5 MG tablet  prochlorperazine (COMPAZINE) 10 MG tablet  tamsulosin (FLOMAX) 0.4 MG capsule  triamterene-HCTZ (MAXZIDE-25) 37.5-25 MG tablet        Past Medical History:    Past Medical History:   Diagnosis Date    Cancer of kidney, right (H)     Complication of anesthesia     Malignant neoplasm of prostate metastatic to bone (H) 2/14/2019    Thyroid disease        Past Surgical History:    Past Surgical History:   Procedure Laterality Date    CYSTOSCOPY      about 10 years ago    INSERT PORT VASCULAR ACCESS Right 5/2/2019    Procedure: Chest Port Placement;  Surgeon: Tate Burciaga PA-C;  Location: [facility] OR    IR CHEST PORT PLACEMENT > 5 YRS OF AGE  5/2/2019    LAPAROSCOPIC NEPHRECTOMY Right  3/19/2019    Procedure: Right laparoscopic radical nephrectomy;  Surgeon: Wesley Cleveland MD;  Location: [facility] OR        Physical Exam   Patient Vitals for the past 24 hrs:   BP Temp Temp src Pulse Resp SpO2 Height Weight   08/25/23 1443 135/87 -- -- 75 -- 95 % -- --   08/25/23 1217 (!) 143/87 -- -- 65 -- 99 % -- --   08/25/23 0952 (!) 136/99 97.1  F (36.2  C) Temporal 71 16 97 % 1.829 m (6') 121.6 kg (268 lb 1.3 oz)        Physical Exam  Vitals reviewed.   HENT:      Head: Normocephalic.   Cardiovascular:      Rate and Rhythm: Normal rate and regular rhythm.   Pulmonary:      Effort: Pulmonary effort is normal.   Abdominal:      General: Abdomen is flat.   Musculoskeletal:      Comments: Pain localizes to the left mid thigh.  Seems to be worse with bearing weight as well as motion of the knee.  No erythema or warmth.  Tenderness with palpation as well as with motion.   Skin:     General: Skin is warm.      Capillary Refill: Capillary refill takes less than 2 seconds.   Neurological:      Mental Status: He is alert.           Emergency Department Course     Imaging:  XR Femur Left 2 Views   Final Result   IMPRESSION:       There is a 2.0 cm sclerotic lesion within the mid left femoral   diaphysis which appears increased in size compared to the topogram   images from the PET/CT dated 11/14/2022 and likely represents   metastatic disease. No fracture is seen but this lesion does appear to   involve the majority of the medullary space which could place the   patient at increased fracture risk.      No acute fracture or dislocation. Normal left hip and knee joint   spacing. No knee joint effusion.      Sclerotic metastatic lesions in the left femoral neck and left iliac   bone are better evaluated on the prior pelvic MRI and PET/CT.       BEAU KENYON MD            SYSTEM ID:  [id number]      XR Knee Left 3 Views   Final Result   IMPRESSION:       There is a 2.0 cm sclerotic lesion within the mid left  femoral   diaphysis which appears increased in size compared to the topogram   images from the PET/CT dated 11/14/2022 and likely represents   metastatic disease. No fracture is seen but this lesion does appear to   involve the majority of the medullary space which could place the   patient at increased fracture risk.      No acute fracture or dislocation. Normal left hip and knee joint   spacing. No knee joint effusion.      Sclerotic metastatic lesions in the left femoral neck and left iliac   bone are better evaluated on the prior pelvic MRI and PET/CT.       BEAU KENYON MD            SYSTEM ID:  [id number]         Report per radiology    Laboratory:  Labs Ordered and Resulted from Time of ED Arrival to Time of ED Departure   D DIMER QUANTITATIVE - Abnormal       Result Value    D-Dimer Quantitative 0.52 (*)    BASIC METABOLIC PANEL - Abnormal    Sodium 139      Potassium 4.0      Chloride 100      Carbon Dioxide (CO2) 25      Anion Gap 14      Urea Nitrogen 17.0      Creatinine 1.05      Calcium 9.3      Glucose 106 (*)     GFR Estimate 81     CBC WITH PLATELETS AND DIFFERENTIAL - Abnormal    WBC Count 6.1      RBC Count 3.97 (*)     Hemoglobin 11.8 (*)     Hematocrit 35.7 (*)     MCV 90      MCH 29.7      MCHC 33.1      RDW 13.9      Platelet Count 220      % Neutrophils 73      % Lymphocytes 16      % Monocytes 10      % Eosinophils 1      % Basophils 0      % Immature Granulocytes 0      NRBCs per 100 WBC 0      Absolute Neutrophils 4.4      Absolute Lymphocytes 1.0      Absolute Monocytes 0.6      Absolute Eosinophils 0.1      Absolute Basophils 0.0      Absolute Immature Granulocytes 0.0      Absolute NRBCs 0.0     IONIZED CALCIUM - Normal    Calcium Ionized Whole Blood 4.6     MAGNESIUM - Normal    Magnesium 1.9            Emergency Department Course & Assessments:             Interventions:  Medications   HYDROmorphone (DILAUDID) tablet 2 mg (2 mg Oral $Given 8/25/23 1227)   dexAMETHasone (DECADRON)  injection 4 mg (4 mg Intravenous $Given 8/25/23 9736)        Assessments:    Independent Interpretation (X-rays, CTs, rhythm strip):  None    Consultations/Discussion of Management or Tests:  Dr. Jennifer Tavares at the [facility].  Care was delayed as 3 call phone call  by 2-1/2 hours did not produce a on-call physician for the [facility] oncology department.  Ultimately I was able to contact the [facility] directly who eventually called  Was of a cell phone and he picked up.       Social Determinants of Health affecting care:   None    Disposition:  The patient was discharged to home.     Impression & Plan        Medical Decision Making:  Patient presents with ongoing left-sided leg and thigh pain.  Examination shows no redness or warmth.  Not clearly from the joint patient has a history of extensive metastatic lesions in these musculoskeletal system and known extensive metastases from prostate cancer.  Imaging of the knee and thigh does identify an enlarging metastatic lesion of the left mid thigh bone.  Suspect this is the source of pain.  I did do a call and try to discuss further imaging with an oncologist at the [facility].  This seems to be quite difficult as we were unable to locate an oncologist or contact anyone on call.  Delays were up to 2 to 3 hours without a call back.  Ultimately we were able to route achieve a contact to a Dr. Jennifer Tavares at the [facility] we do not seem to have much further input on MRIs or CT scans in this case.  Patient is able to bear weight.  Encourage patient to follow-up with his oncologist he may require localized radiation to the thigh to assist in metastatic lesions patient is at high risk for pathological fracture.  Did consider MRI ROSA M imaging but due to pain control and able to bear weight offered discharge and follow-up as an outpatient.  Did consider DVT but no redness or swelling D-dimer is less than the 10th the patient's age and therefore do not recommend  ultrasound due to clear pathology in the bone.    Critical Care time:  was 0 minutes for this patient excluding procedures.    Diagnosis:    ICD-10-CM    1. Pain of left femur  M89.8X5       2. Malignant neoplasm of prostate metastatic to bone (H)  C61 PSA, tumor marker    C79.51 PSA, tumor marker           Discharge Medications:  Discharge Medication List as of 8/25/2023  4:58 PM        START taking these medications    Details   dexAMETHasone (DECADRON) 4 MG tablet Take 1 tablet (4 mg) by mouth daily, Disp-7 tablet, R-0, Local Print      HYDROmorphone (DILAUDID) 2 MG tablet Take 1 tablet (2 mg) by mouth every 6 hours as needed for pain, Disp-10 tablet, R-0, Local Print                Osmin Portillo MD  8/25/2023   Osmin Portillo MD Goodman, Brian Samuel, MD  08/28/23 6182

## 2023-08-25 NOTE — DISCHARGE INSTRUCTIONS
We have discussed her care with Dr. Fuller today at the [facility].  Please start dexamethasone for bone pain.  Use Dilaudid when needed for pain on top of Butrans patch.  Please limit highly exertional activity as you have an enlarging lesion in the left thigh that may spontaneously fracture pain becomes unbearable you are able to walk on the leg or you see a lot more either redness or swelling of the leg return to the emergency room.  Please follow-up with your oncologist on Monday discuss long-term game plan.  Thanks again for your patience as it took quite a while to reach your  oncologist today.

## 2023-08-25 NOTE — ED TRIAGE NOTES
Pt ambulated into ER w/o problem, with wife for reoccurring pain which is in left knee that started yesterday morning, denies injury or strain. Report get pain in different areas.  Took Tylenol @ 0230 & 0830 today.  Did take Oxycodone yesterday for palliative care but reports doesn't do anything.  Does receive radiation treatment which was about 4 wks ago.      Triage Assessment       Row Name 08/25/23 0951       Triage Assessment (Adult)    Airway WDL WDL       Respiratory WDL    Respiratory WDL WDL       Cardiac WDL    Cardiac WDL WDL

## 2023-08-25 NOTE — TELEPHONE ENCOUNTER
"Oncology Nurse Triage - Pain  Situation: Walter reporting the following symptoms: 4 episodes of pain in thighs lasting 2-3 days per episode over the past 8-10 weeks.    Background:   Treating Provider: Dr. Winters, first apt with Dr. 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 Mcnair, Pain/Palliative care apt on 9/20 w/Dr. Zeng    Recent Treatments: 8/3/23: Q 90 day Zometa/Lupron, on pluvicto, cycle 2 on 7/25  Palliative Care team: for L leg and sacral pain--butrans patch weekly, gabapentin 300 am and 600 pm, oxy 5-10 Q 4H as needed.     Assessment:     Location/Quality: Knee/thigh pain in R/L thighs, usually L>R, in L knee today; shoots up/radiates up leg toward hip, concentrated in thigh area; on palliative plan for pain for low back/L leg pain and takes oxycodone for that but oxycodone doesn't touch this pain. Pt states that per palliative, the oxy and butrans patch keeps the peaks low for LB pain.    Onset: Episodes started 8-10 weeks ago, episodes happening more often and moving to different parts of his leg/hip; has had 4 episodes/8-10 weeks lasting 2-3 days per episode.   Today he was doing some \"get ups\" where he would stand up and sit down, stand up and sit back down on a chair repeatedly, and now can't stand d/t pain in L knee to the point that he needs to  use crutches.    Duration/Frequency/Rating: has had four episodes, last 2-3 days; first day the pain ramps up pretty quickly, 10/10;    Quality: shooting/radiating    Current med(s) used: tylenol Q 6H, 2 es takes down small percentage, can't take ibp diverticulitis on gabapentin for back pain.     Worsens or relieves with: time resolves. Ice doesn't help. Oxy doesn't help. Tylenol takes it down a small percentage. Today he has to use crutches " because he can't bear weight    Denies fevers/chills, cough, sore throat, chest pain, SOB, headache, n/v, bowel & bladder issues, abdominal pain, rash, new or increased bleeding or worsening fatigue.      Recommendations:   Paged provider [phone], Dr. Mcnair: pt needs to go to ED for evaluation to rule out causes for pain as this is not something that can be worked up in clinic today and can't wait for eval at visit with provider next week.    0927: Spoke with Walter and advised per Dr. Mcnair. Pt voiced understanding and will go to [facility] ED for evaluation now.    0930: Called [facility] to give report to Charge Nurse, Michele.    Message routed to Dr. Mcnair and Adilene Restrepo, LEW

## 2023-08-27 DIAGNOSIS — C79.51 MALIGNANT NEOPLASM OF PROSTATE METASTATIC TO BONE (H): Primary | ICD-10-CM

## 2023-08-27 DIAGNOSIS — C61 MALIGNANT NEOPLASM OF PROSTATE METASTATIC TO BONE (H): Primary | ICD-10-CM

## 2023-08-27 LAB — PSA SERPL DL<=0.01 NG/ML-MCNC: 2.06 NG/ML (ref 0–4.5)

## 2023-08-28 ENCOUNTER — APPOINTMENT (OUTPATIENT)
Dept: LAB | Facility: CLINIC | Age: 63
End: 2023-08-28
Attending: INTERNAL MEDICINE
Payer: COMMERCIAL

## 2023-08-28 ENCOUNTER — ONCOLOGY VISIT (OUTPATIENT)
Dept: ONCOLOGY | Facility: CLINIC | Age: 63
End: 2023-08-28
Attending: NURSE PRACTITIONER
Payer: COMMERCIAL

## 2023-08-28 VITALS
DIASTOLIC BLOOD PRESSURE: 72 MMHG | HEART RATE: 48 BPM | RESPIRATION RATE: 16 BRPM | BODY MASS INDEX: 36.21 KG/M2 | TEMPERATURE: 98 F | OXYGEN SATURATION: 97 % | SYSTOLIC BLOOD PRESSURE: 110 MMHG | WEIGHT: 267 LBS

## 2023-08-28 DIAGNOSIS — C61 MALIGNANT NEOPLASM OF PROSTATE METASTATIC TO BONE (H): ICD-10-CM

## 2023-08-28 DIAGNOSIS — C79.51 MALIGNANT NEOPLASM OF PROSTATE METASTATIC TO BONE (H): ICD-10-CM

## 2023-08-28 LAB
ALBUMIN SERPL BCG-MCNC: 4.4 G/DL (ref 3.5–5.2)
ALP SERPL-CCNC: 166 U/L (ref 40–129)
ALT SERPL W P-5'-P-CCNC: 15 U/L (ref 0–70)
ANION GAP SERPL CALCULATED.3IONS-SCNC: 13 MMOL/L (ref 7–15)
AST SERPL W P-5'-P-CCNC: 24 U/L (ref 0–45)
BILIRUB SERPL-MCNC: 0.3 MG/DL
BUN SERPL-MCNC: 24.8 MG/DL (ref 8–23)
CALCIUM SERPL-MCNC: 9.3 MG/DL (ref 8.8–10.2)
CHLORIDE SERPL-SCNC: 101 MMOL/L (ref 98–107)
CREAT SERPL-MCNC: 1.01 MG/DL (ref 0.67–1.17)
DEPRECATED HCO3 PLAS-SCNC: 23 MMOL/L (ref 22–29)
GFR SERPL CREATININE-BSD FRML MDRD: 85 ML/MIN/1.73M2
GLUCOSE SERPL-MCNC: 118 MG/DL (ref 70–99)
POTASSIUM SERPL-SCNC: 4 MMOL/L (ref 3.4–5.3)
PROT SERPL-MCNC: 7.4 G/DL (ref 6.4–8.3)
PSA SERPL DL<=0.01 NG/ML-MCNC: 2.43 NG/ML (ref 0–4.5)
SODIUM SERPL-SCNC: 137 MMOL/L (ref 136–145)

## 2023-08-28 PROCEDURE — 36591 DRAW BLOOD OFF VENOUS DEVICE: CPT | Performed by: STUDENT IN AN ORGANIZED HEALTH CARE EDUCATION/TRAINING PROGRAM

## 2023-08-28 PROCEDURE — 84153 ASSAY OF PSA TOTAL: CPT | Performed by: STUDENT IN AN ORGANIZED HEALTH CARE EDUCATION/TRAINING PROGRAM

## 2023-08-28 PROCEDURE — 99215 OFFICE O/P EST HI 40 MIN: CPT | Performed by: STUDENT IN AN ORGANIZED HEALTH CARE EDUCATION/TRAINING PROGRAM

## 2023-08-28 PROCEDURE — 99417 PROLNG OP E/M EACH 15 MIN: CPT | Performed by: STUDENT IN AN ORGANIZED HEALTH CARE EDUCATION/TRAINING PROGRAM

## 2023-08-28 PROCEDURE — 84403 ASSAY OF TOTAL TESTOSTERONE: CPT | Performed by: STUDENT IN AN ORGANIZED HEALTH CARE EDUCATION/TRAINING PROGRAM

## 2023-08-28 PROCEDURE — 80053 COMPREHEN METABOLIC PANEL: CPT | Performed by: STUDENT IN AN ORGANIZED HEALTH CARE EDUCATION/TRAINING PROGRAM

## 2023-08-28 PROCEDURE — G0463 HOSPITAL OUTPT CLINIC VISIT: HCPCS | Performed by: STUDENT IN AN ORGANIZED HEALTH CARE EDUCATION/TRAINING PROGRAM

## 2023-08-28 RX ORDER — HYDROMORPHONE HYDROCHLORIDE 2 MG/1
2 TABLET ORAL EVERY 6 HOURS PRN
Qty: 30 TABLET | Refills: 0 | Status: SHIPPED | OUTPATIENT
Start: 2023-08-29 | End: 2023-09-12

## 2023-08-28 ASSESSMENT — PAIN SCALES - GENERAL: PAINLEVEL: NO PAIN (0)

## 2023-08-28 NOTE — LETTER
8/28/2023         RE: Walter Reyes  [id number]  [facility] [id number]        Dear Colleague,    Thank you for referring your patient, Walter Reyes, to the [facility]. Please see a copy of my visit note below.        [facility] Oncology Followup  Oncologist: Tabby  Aug 28, 2023    REASON FOR VISIT: Follow-up for metastatic castration-resistant prostate cancer.      INTERVAL HISTORY: Feeling pretty tired overall.  Energy isn't nearly as good as a few months ago.  Tolerating Pluvicto OK, some nausea that is controlled with compazine.  This lasts for a few days.  Had severe L thigh pain last week.  Pain started mid shaft of L femur and went to knee, but did not radiate up leg and did not seem to originate in his back.  Pain was so bad that he couldn't bear weight on it.  Seen in the ED and XR noted enlarging mass.  Given dilaudid 2mg which he is only using at night with good effect.  Dexamethasone 4mg was also started with good effect.  He is able to walk and bear weight, but discomfort is still present.      ONCOLOGY HISTORY: Mr. 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 His oncologic history is detailed below.     1/6/21  PSA = 0.29  3/31/21 PSA = 0.44  7/7/21  PSA = 0.47  11/2021 PSA = 1.05  -- Start XTANDI  12/16/21 PSA =0.22  2/11/22 PSA= 0.50  3/22/22 PSA=0.72  5/5/2022 PSA=1.79  7/11/2022 PSA=2.16 --Start cycle 1 docetaxel   8/22/22 PSA = 1.36  9/12/22 PSA = 1.09  10/24/22 Cycle #6 of Docetaxel. End of treatment  1/9/23  PSA =0.66  3/20/23  PSA=1.54  4/21/23 PSA = 1.81  T=15j  5/22/23 C1 Pluvicto   06/07/23          PSA = 1.42  7/18/23 PSA=1.75, C2 Pluvicto   8/28/23 Transfer of care initial visit for Tabby.  PSA 2.06.  C3 Pluvicto scheduled for 8/30.  Proceed with dose as planned.  MR PUENTES  "femur, ortho referral, PSMA PET ordered for prior to follow-up.      ONCOLOGIC HISTORY:  1. 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 Naylor at [facility]. Prior PSA were 1.4 on 8/16/17, 2.4 on 6/28/16, 2.9 on 6/28/16, and as low as 0.4 on 3/26/2003.   - 1/08/2019: Consultation with Sarah Wilson CNP in Urology clinic. Repeat PSA 9.6.  - 1/16/2019: MRI prostate with contrast - \"This examination is characterized as PIRADS 5- very high probability. Clinically significant cancer is highly likely to be present. There is a large, invasive mass arising from the right peripheral zone and extending into the neurovascular bundle, seminal vesicle, and along the anterolateral right mesorectal fascia. Metastatic right external iliac lymph node. Metastatic lesion in the posterior/superior right acetabulum.\"  - 1/25/2019: CT abdomen and pelvis with IV contrast - \"1. Heterogeneous enhancing mass posteriorly in the upper pole of the right kidney measures 4.5 x 5.8 x 5.7 cm (AP by transverse by craniocaudal). It has a small nodular component extending posterior medially which abuts the right psoas muscle. This nodular extension measures 2.1 x 2.1 cm. Minimal stranding about the mass. No definite thrombus within the right renal vein. This renal mass is compatible with a renal cell carcinoma. A paraaortic lymph node situated immediately posterior to the left renal vein measures 1.1 cm in short axis, suspicious for metastasis. 2. A 2 cm right external iliac lymph node is also suspicious for metastases. 3. Multiple sclerotic osseous lesions suspicious for metastases. These include 0.8 and 0.6 cm sclerotic lesions laterally in the right iliac wing (images 53 and 62 respectively). Ill-defined groundglass density laterally in the right acetabulum measuring 1.7 x " "1.2 cm corresponds with the lesion identified on MRI. A 0.4 cm groundglass density in the left acetabulum. Sclerotic lesion in the left femoral neck measures 0.9 x 1.6 cm (image 81). Sclerotic metastases would be more compatible with prostate metastases. 4. A 3 subcentimeter hepatic lesions are indeterminate. Metastases would be a consideration. These can be further characterized with liver MRI.\"  - 1/25/2019: NM bone scan - \"There is focal bony uptake in the left femoral neck, right acetabulum, right of midline at the S1 or L5 level of the spine, within multiple bilateral ribs, in the C7 or T1 level of the spine, and anteriorly within the skull. Findings are suspicious for metastases.\"  - 1/31/19: CT chest with contrast - \" No suspicious nodules in the chest.  Stable appearance of a 5.8 cm right renal mass concerning for renal carcinoma until proven otherwise.  Stable indeterminant subcentimeter hypodensities in the liver.  Multifocal osteoblastic metastasis including 2.5 cm lesion in the right fifth rib posteriorly and a 1.6 cm lesion in the right third rib posteriorly. No lytic lesions identified.\"  - 2/6/19: CT-guided right sclerotic fifth rib lesion biopsy - \"Metastatic carcinoma, consistent with prostate primary.  Immunohistochemical stains performed show the metastatic carcinoma stains positive for NKX3.1 (prostate marker), negative for STACIE 3 and PAX8, which supports the above diagnosis.\"  - 2/15/19: Started Casodex 50mg every day and consented for the biobanking protocol. 3/18/19 - stopped Casodex.  - 2/19/19: Case discussed in tumor board - recommendation for nephectomy for suspected malignant right renal mass.   - 2/26/19: Started Lupron 22.5mg every 3 months.   - 5/8/19: Started Docetaxel 75mg/m2 IV every 3 weeks. 06/18/19 - cycle 3, 7/10/19 - cycle 4, 07/31/19 - cycle 5, 08/21/19 - cycle 6.   - 10/2/19: Restaging CT CAP and NM Bone Scan showed improved osseous disease, no evidence of recurrent or new " "metastatic disease.  - 1/5/20: Restaging CT CAP and NM Bone Scan showed stable osseous disease, no evidence of recurrent or new metastatic disease.  - 4/6/20: NM bone scan with slightly improved uptake in known skeletal mets. CT C/A/P with contrast with stable osseous mets, no yusuf enlargement, no new visceral mets etc.  - 04/08/20: Zometa every 3 months.  - 10/5/20: CT C/A/P with contrast and NM bone scan - SD.   - 1/4/21: CT C/A/P with contrast and NM bone scan - SD but slight increase in right 5th rib tracer uptake and in posterior L5 sclerosis.   - 03/29/21: CT C/A/P with contrast - single new right sacral 8mm bone lesion (suspicious), other bone mets stable. No visceral/yusuf disease. Nephrectomy site without recurrence. NM bone scan - SD but \"increased uptake associated with the prior lesions of the lower  cervical spine, posterior right fourth rib and right L5 posterior arch elements. Otherwise unchanged uptake associated with the proximal left femur and left anterior fourth rib. Similar uptake of the left halux, possibly secondary to degenerative osteoarthritis or gout.\"      Radiation history:   C7         3,000 cGy       06 X      8/05/2021        8/18/2021        13       10  2 Sacrum          2,500 cGy       18 X      4/12/2022        4/18/2022         6               Sacrum retreat               700 cGy        18 X      2/28/2023        2/28/2023    2. Stage III ([id number]), grade 3 of 4, clear-cell RCC of right kidney:  - Incidentally diagnosed as above.   - Underwent curative-intent robotic right radical nephrectomy with Dr. Wesley Cleveland on 3/19/19. No tumor spillage per op report.   - Path showed: \"Histologic Type: Clear cell renal cell carcinoma; Sarcomatoid Features: Not identified; Histologic Grade: Nucleolar grade 3 (WHO/ISUP). Extent Tumor Size: 4.3 cm. Microscopic Tumor Extension: Tumor extension into renal sinus (in vascular structures). Margins: Negative. Tumor Necrosis: Present; focal. " "Lymph-Vascular Invasion: Not identified.  Pathologic Staging (pTNM) Primary Tumor (pT): pT3a: Tumor extends into renal vein branches/renal sinus. Regional Lymph Nodes (pN): pNX. Number of Lymph Nodes Examined: 0 Distant Metastasis (pM): pM N/A.\"  - Restaging scans as above.    PAST MEDICAL HISTORY:  Past Medical History:   Diagnosis Date    Cancer of kidney, right (H)     Complication of anesthesia     slow wakeup     Malignant neoplasm of prostate metastatic to bone (H) 2/14/2019    Thyroid disease      CURRENT MEDICATIONS:   Current Outpatient Medications:     atorvastatin (LIPITOR) 20 MG tablet, Take 60 mg by mouth daily , Disp: , Rfl:     buprenorphine (BUTRANS) 20 MCG/HR WK patch, Place 1 patch onto the skin every 7 days, Disp: 4 patch, Rfl: 0    calcium citrate-vitamin D (CITRACAL) 315-250 MG-UNIT TABS per tablet, Take 650 mg by mouth 2 times daily , Disp: , Rfl:     cycloSPORINE (RESTASIS) 0.05 % ophthalmic emulsion, Place 1 drop into both eyes 2 times daily , Disp: , Rfl:     dexAMETHasone (DECADRON) 4 MG tablet, Take 1 tablet (4 mg) by mouth daily, Disp: 7 tablet, Rfl: 0    fluconazole (DIFLUCAN) 200 MG tablet, Take 1 tablet (200 mg) by mouth daily, Disp: 60 tablet, Rfl: 1    gabapentin (NEURONTIN) 300 MG capsule, Take 1 capsule (300 mg) by mouth 2 times daily AND 2 capsules (600 mg) At Bedtime., Disp: 120 capsule, Rfl: 1    Glucosamine-Chondroit-Vit C-Mn (GLUCOSAMINE 1500 COMPLEX) CAPS, Take 1 capsule by mouth daily, Disp: , Rfl:     HYDROmorphone (DILAUDID) 2 MG tablet, Take 1 tablet (2 mg) by mouth every 6 hours as needed for pain, Disp: 30 tablet, Rfl: 0    levothyroxine (SYNTHROID/LEVOTHROID) 112 MCG tablet, Take 112 mcg by mouth daily, Disp: , Rfl:     loratadine (CLARITIN) 10 MG tablet, , Disp: , Rfl:     Multiple Vitamins-Minerals (MULTIVITAMIN ADULT EXTRA C PO), Take 1 tablet by mouth every 24 hours, Disp: , Rfl:     naloxone (NARCAN) 4 MG/0.1ML nasal spray, Spray 1 spray (4 mg) into one nostril " alternating nostrils as needed for opioid reversal every 2-3 minutes until assistance arrives, Disp: 0.2 mL, Rfl: 1    Nutritional Supplements (SALMON OIL) CAPS, Take 2 capsules by mouth daily , Disp: , Rfl:     omeprazole (PRILOSEC) 20 MG DR capsule, Take 20 mg by mouth daily, Disp: , Rfl:     oxyCODONE (ROXICODONE) 5 MG tablet, Take 1-2 tablets (5-10 mg) by mouth every 4 hours as needed for pain, Disp: 40 tablet, Rfl: 0    prochlorperazine (COMPAZINE) 10 MG tablet, Take 1 tablet (10 mg) by mouth every 6 hours as needed for nausea or vomiting, Disp: 90 tablet, Rfl: 1    tamsulosin (FLOMAX) 0.4 MG capsule, Take 1 capsule (0.4 mg) by mouth daily, Disp: 30 capsule, Rfl: 3    triamterene-HCTZ (MAXZIDE-25) 37.5-25 MG tablet, Take 1 tablet by mouth daily, Disp: , Rfl:     Physical Exam:   /72   Pulse (!) 48   Temp 98  F (36.7  C) (Oral)   Resp 16   Wt 121.1 kg (267 lb)   SpO2 97%   BMI 36.21 kg/m    General: Patient appears well in no acute distress.   Skin: No visualized rash or lesions on visualized skin  Eyes: EOMI, no erythema, sclera icterus or discharge noted  Resp: Appears to be breathing comfortably without accessory muscle usage, speaking in full sentences, no cough  MSK: Appears to have normal range of motion based on visualized movements  Neurologic: No apparent tremors, facial movements symmetric  Psych: affect bright, alert and oriented    ECOG PS 1.    LABORATORY DATA:  Most Recent 3 CBC's:  Recent Labs   Lab Test 08/25/23  1219 07/18/23  0818 06/07/23  0852   WBC 6.1 3.9* 5.7   HGB 11.8* 12.1* 12.4*   MCV 90 89 92    220 245   ANEUTAUTO 4.4 2.2 3.3     Most Recent 3 BMP's:  Recent Labs   Lab Test 08/28/23  1200 08/25/23  1219 07/18/23  0818 06/07/23  0852    139 138 137   POTASSIUM 4.0 4.0 4.0 4.2   CHLORIDE 101 100 102 101   CO2 23 25 25 25   BUN 24.8* 17.0 18.4 21.3   CR 1.01 1.05 0.93 1.10   ANIONGAP 13 14 11 11   BETHANY 9.3 9.3 8.9 9.2   * 106* 109* 98   PROTTOTAL 7.4  --   "6.6 6.9   ALBUMIN 4.4  --  4.2 4.3    Most Recent 3 LFT's:  Recent Labs   Lab Test 08/28/23  1200 07/18/23  0818 06/07/23  0852   AST 24 28 23   ALT 15 22 25   ALKPHOS 166* 114 95   BILITOTAL 0.3 0.2 0.2    Most Recent 2 TSH and T4:  Recent Labs   Lab Test 07/07/21  0834 03/31/21  0824   TSH 1.67 1.63       I reviewed the above labs today.    PSA trend, see oncology history.     8/25/23 Xrays significant for enlarging L femur mass, no clear fracture.      ASSESSMENT & PLAN: Mr. Reyes is a delightful 63 year old gentleman with metastatic castration sensitive prostate adenocarcinoma as well as an incidentally diagnosed stage III clear-cell renal cell carcinoma of the right kidney (s/p radical R nephrectomy 3/19/19), who is here for a follow-up visit and initiation of docetafor now metastatic castration-resistant prostate cancer.     1. Metastatic castration-resistant prostate cancer, with involvement of the bones and RPLN:   - Patient met the criteria for CHAARTED \"high-volume\" metastatic hormone-sensitive prostate cancer. He opted for docetaxel in combination with ADT (per CHACHRIS, [id number], FELIPEEDE). He was subsequently treated with docetaxel x6 doses in the [facility] setting and enzalutamide.  He initiated Pluvicto in May 2023 with an initial slight decline in PSA, but this began to rise just prior to Cycle 3.  Given his XR evidence of progression in L femur and PSA rise, we will re-evaluate his staging with PSMA PET scan and determine the next steps for therapy if a transition is warranted.  Reconsented for  [employer] on 8/28/23.    -Proceed with pluvicto dose #3.   -Zometa next due 11/2023 along with Lupron.   -PSMA PET in the interval.   -Palliative to manage further opiate refills.   -Trial of dexamethasone, then stop.  Restart if worsening pain.      2. Bone metastases:   - Noted to have osseous metastatic disease at the time of diagnosis. S/p radiation to C spine and sacrum (Re-irradiation to " sacrum).   - worsening radiculopathy down starting in the low back and radiating down the left leg in June. MRI read was without new/acute abnormalities  - Pain continues to fluctuate but overall well controlled/managable   - pain mgmt per palliative care, on butrans patch now with addition of dilaudid.    - Encouraged to continue calcium and vitamin D supplementation; continue Zometa 4mg IV every ~3 months. Due next in November 2023.   -Referral for Radiation oncology for consideration of RT to painful L femur lesion in August 2023.  Ortho referral for eval for possible stabilization.       3. Stage 3, UISS-high risk ccRCC: s/p R nephrectomy   - approximately 40-50% risk of recurrence after definitive surgery.   - no clear evidence of residual disease at this time; the retroperitoneal lymphadenopathy is more consistent with metastatic prostate cancer   - Continue active surveillance with self-reporting for signs/symptoms of recurrence (this was previously explained in detail) and periodic H&P and scans.    PLAN:     Continue dexamethasone, then stop.  If pain worsens, we will restart.   I have refilled your dilaudid for a month.  Palliative care will refill moving forward.   Proceed with Pluvicto dose #3 as scheduled.   Radiation oncology referral for consideration of treatment of your L femur mass.   PSMA PET scan before you see me next.    Orthopedic referral for evaluation of your L femur mass.  MRI of the femur prior to that visit.    See me back on 10/2 to discuss progress and whether to continue Pluvicto or switch treatments.      A total of 75 minutes were spent on this patient on the day of the encounter, of which more than 50% of this time was used for counseling and coordination of care.  The patient and his wife were given the opportunity to ask multiple questions today, all of which were answered to their satisfaction.     Omar Mcnair MD, PhD   of Medicine   Oncology/BMT/Cellular  Therapies        [id number] : Informed Consent Note     The consent form, including purpose, risks and benefits, was reviewed with Walter Reyes, and all questions were answered before he signed the consent form. The patient understands that the study involves an active treatment phase as well as a post-treatment follow up phase.     Present during the discussion was Micheline Reyes, his wife, who also signed as witness. A copy of the signed form was provided to the patient. No procedures specific to this study were performed prior to the patient signing the consent form.    Consent Version Date: 07/13/2023  Consent obtained by: Omar Mcnair    Date: 8/28/2023  HIPAA authorization signed?: yes  HIPAA authorization version date: 05/13/2021    Omar Mcnair MD  Form 503.03.01 (Version 2)     Effective date: 01AUG2018     Next Review Date: 01AUG2020

## 2023-08-28 NOTE — NURSING NOTE
Oncology Rooming Note    August 28, 2023 12:34 PM   Walter Reyes is a 63 year old male who presents for:    Chief Complaint   Patient presents with    Port Draw     Labs drawn via port. VS taken.    Oncology Clinic Visit     Prostate CA     Initial Vitals: /72   Pulse (!) 48   Temp 98  F (36.7  C) (Oral)   Resp 16   Wt 121.1 kg (267 lb)   SpO2 97%   BMI 36.21 kg/m   Estimated body mass index is 36.21 kg/m  as calculated from the following:    Height as of 8/25/23: 1.829 m (6').    Weight as of this encounter: 121.1 kg (267 lb). Body surface area is 2.48 meters squared.  No Pain (0) Comment: Data Unavailable   No LMP for male patient.  Allergies reviewed: Yes  Medications reviewed: Yes    Medications: Medication refills not needed today.  Pharmacy name entered into EPIC:    [facility] [place], [place] - [address] [id number] - [place], [place] - [address] [facility] - [place], [place] - [address] [place] [address] PHARMACY [place], [place] - [address] [facility] [id number]  [facility] [place], [place] - [address] [facility]    Clinical concerns:        Evangelina Shankar

## 2023-08-28 NOTE — PROGRESS NOTES
[facility] Oncology Followup  Oncologist: Tabby  Aug 28, 2023    REASON FOR VISIT: Follow-up for metastatic castration-resistant prostate cancer.      INTERVAL HISTORY: Feeling pretty tired overall.  Energy isn't nearly as good as a few months ago.  Tolerating Pluvicto OK, some nausea that is controlled with compazine.  This lasts for a few days.  Had severe L thigh pain last week.  Pain started mid shaft of L femur and went to knee, but did not radiate up leg and did not seem to originate in his back.  Pain was so bad that he couldn't bear weight on it.  Seen in the ED and XR noted enlarging mass.  Given dilaudid 2mg which he is only using at night with good effect.  Dexamethasone 4mg was also started with good effect.  He is able to walk and bear weight, but discomfort is still present.      ONCOLOGY HISTORY: Mr. 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 His oncologic history is detailed below.     1/6/21  PSA = 0.29  3/31/21 PSA = 0.44  7/7/21  PSA = 0.47  11/2021 PSA = 1.05  -- Start XTANDI  12/16/21 PSA =0.22  2/11/22 PSA= 0.50  3/22/22 PSA=0.72  5/5/2022 PSA=1.79  7/11/2022 PSA=2.16 --Start cycle 1 docetaxel   8/22/22 PSA = 1.36  9/12/22 PSA = 1.09  10/24/22 Cycle #6 of Docetaxel. End of treatment  1/9/23  PSA =0.66  3/20/23  PSA=1.54  4/21/23 PSA = 1.81  T=15j  5/22/23 C1 Pluvicto   06/07/23          PSA = 1.42  7/18/23 PSA=1.75, C2 Pluvicto   8/28/23 Transfer of care initial visit for Tabby.  PSA 2.06.  C3 Pluvicto scheduled for 8/30.  Proceed with dose as planned.  MR PUENTES femur, ortho referral, PSMA PET ordered for prior to follow-up.      ONCOLOGIC HISTORY:  1. De deja metastatic prostate adenocarcinoma, stage IV (M1b at diagnosis), high-volume, castration-sensitive:  - 12/13/2018: PSA found to be elevated to 9.1 ng/mL on a routine follow-up with  "primary care provider Dr. Naylor at [facility]. Prior PSA were 1.4 on 8/16/17, 2.4 on 6/28/16, 2.9 on 6/28/16, and as low as 0.4 on 3/26/2003.   - 1/08/2019: Consultation with Sarah Wilson CNP in Urology clinic. Repeat PSA 9.6.  - 1/16/2019: MRI prostate with contrast - \"This examination is characterized as PIRADS 5- very high probability. Clinically significant cancer is highly likely to be present. There is a large, invasive mass arising from the right peripheral zone and extending into the neurovascular bundle, seminal vesicle, and along the anterolateral right mesorectal fascia. Metastatic right external iliac lymph node. Metastatic lesion in the posterior/superior right acetabulum.\"  - 1/25/2019: CT abdomen and pelvis with IV contrast - \"1. Heterogeneous enhancing mass posteriorly in the upper pole of the right kidney measures 4.5 x 5.8 x 5.7 cm (AP by transverse by craniocaudal). It has a small nodular component extending posterior medially which abuts the right psoas muscle. This nodular extension measures 2.1 x 2.1 cm. Minimal stranding about the mass. No definite thrombus within the right renal vein. This renal mass is compatible with a renal cell carcinoma. A paraaortic lymph node situated immediately posterior to the left renal vein measures 1.1 cm in short axis, suspicious for metastasis. 2. A 2 cm right external iliac lymph node is also suspicious for metastases. 3. Multiple sclerotic osseous lesions suspicious for metastases. These include 0.8 and 0.6 cm sclerotic lesions laterally in the right iliac wing (images 53 and 62 respectively). Ill-defined groundglass density laterally in the right acetabulum measuring 1.7 x 1.2 cm corresponds with the lesion identified on MRI. A 0.4 cm groundglass density in the left acetabulum. Sclerotic lesion in the left femoral neck measures 0.9 x 1.6 cm (image 81). Sclerotic metastases would be more compatible with prostate metastases. 4. A 3 subcentimeter " "hepatic lesions are indeterminate. Metastases would be a consideration. These can be further characterized with liver MRI.\"  - 1/25/2019: NM bone scan - \"There is focal bony uptake in the left femoral neck, right acetabulum, right of midline at the S1 or L5 level of the spine, within multiple bilateral ribs, in the C7 or T1 level of the spine, and anteriorly within the skull. Findings are suspicious for metastases.\"  - 1/31/19: CT chest with contrast - \" No suspicious nodules in the chest.  Stable appearance of a 5.8 cm right renal mass concerning for renal carcinoma until proven otherwise.  Stable indeterminant subcentimeter hypodensities in the liver.  Multifocal osteoblastic metastasis including 2.5 cm lesion in the right fifth rib posteriorly and a 1.6 cm lesion in the right third rib posteriorly. No lytic lesions identified.\"  - 2/6/19: CT-guided right sclerotic fifth rib lesion biopsy - \"Metastatic carcinoma, consistent with prostate primary.  Immunohistochemical stains performed show the metastatic carcinoma stains positive for NKX3.1 (prostate marker), negative for STACIE 3 and PAX8, which supports the above diagnosis.\"  - 2/15/19: Started Casodex 50mg every day and consented for the biobanking protocol. 3/18/19 - stopped Casodex.  - 2/19/19: Case discussed in tumor board - recommendation for nephectomy for suspected malignant right renal mass.   - 2/26/19: Started Lupron 22.5mg every 3 months.   - 5/8/19: Started Docetaxel 75mg/m2 IV every 3 weeks. 06/18/19 - cycle 3, 7/10/19 - cycle 4, 07/31/19 - cycle 5, 08/21/19 - cycle 6.   - 10/2/19: Restaging CT CAP and NM Bone Scan showed improved osseous disease, no evidence of recurrent or new metastatic disease.  - 1/5/20: Restaging CT CAP and NM Bone Scan showed stable osseous disease, no evidence of recurrent or new metastatic disease.  - 4/6/20: NM bone scan with slightly improved uptake in known skeletal mets. CT C/A/P with contrast with stable osseous mets, no " "yusuf enlargement, no new visceral mets etc.  - 04/08/20: Zometa every 3 months.  - 10/5/20: CT C/A/P with contrast and NM bone scan - SD.   - 1/4/21: CT C/A/P with contrast and NM bone scan - SD but slight increase in right 5th rib tracer uptake and in posterior L5 sclerosis.   - 03/29/21: CT C/A/P with contrast - single new right sacral 8mm bone lesion (suspicious), other bone mets stable. No visceral/yusuf disease. Nephrectomy site without recurrence. NM bone scan - SD but \"increased uptake associated with the prior lesions of the lower  cervical spine, posterior right fourth rib and right L5 posterior arch elements. Otherwise unchanged uptake associated with the proximal left femur and left anterior fourth rib. Similar uptake of the left halux, possibly secondary to degenerative osteoarthritis or gout.\"      Radiation history:   C7         3,000 cGy       06 X      8/05/2021        8/18/2021        13       10  2 Sacrum          2,500 cGy       18 X      4/12/2022        4/18/2022         6               Sacrum retreat               700 cGy        18 X      2/28/2023        2/28/2023    2. Stage III ([id number]), grade 3 of 4, clear-cell RCC of right kidney:  - Incidentally diagnosed as above.   - Underwent curative-intent robotic right radical nephrectomy with Dr. Wesley Cleveland on 3/19/19. No tumor spillage per op report.   - Path showed: \"Histologic Type: Clear cell renal cell carcinoma; Sarcomatoid Features: Not identified; Histologic Grade: Nucleolar grade 3 (WHO/ISUP). Extent Tumor Size: 4.3 cm. Microscopic Tumor Extension: Tumor extension into renal sinus (in vascular structures). Margins: Negative. Tumor Necrosis: Present; focal. Lymph-Vascular Invasion: Not identified.  Pathologic Staging (pTNM) Primary Tumor (pT): pT3a: Tumor extends into renal vein branches/renal sinus. Regional Lymph Nodes (pN): pNX. Number of Lymph Nodes Examined: 0 Distant Metastasis (pM): pM N/A.\"  - Restaging scans as " above.    PAST MEDICAL HISTORY:  Past Medical History:   Diagnosis Date    Cancer of kidney, right (H)     Complication of anesthesia     slow wakeup     Malignant neoplasm of prostate metastatic to bone (H) 2/14/2019    Thyroid disease      CURRENT MEDICATIONS:   Current Outpatient Medications:     atorvastatin (LIPITOR) 20 MG tablet, Take 60 mg by mouth daily , Disp: , Rfl:     buprenorphine (BUTRANS) 20 MCG/HR WK patch, Place 1 patch onto the skin every 7 days, Disp: 4 patch, Rfl: 0    calcium citrate-vitamin D (CITRACAL) 315-250 MG-UNIT TABS per tablet, Take 650 mg by mouth 2 times daily , Disp: , Rfl:     cycloSPORINE (RESTASIS) 0.05 % ophthalmic emulsion, Place 1 drop into both eyes 2 times daily , Disp: , Rfl:     dexAMETHasone (DECADRON) 4 MG tablet, Take 1 tablet (4 mg) by mouth daily, Disp: 7 tablet, Rfl: 0    fluconazole (DIFLUCAN) 200 MG tablet, Take 1 tablet (200 mg) by mouth daily, Disp: 60 tablet, Rfl: 1    gabapentin (NEURONTIN) 300 MG capsule, Take 1 capsule (300 mg) by mouth 2 times daily AND 2 capsules (600 mg) At Bedtime., Disp: 120 capsule, Rfl: 1    Glucosamine-Chondroit-Vit C-Mn (GLUCOSAMINE 1500 COMPLEX) CAPS, Take 1 capsule by mouth daily, Disp: , Rfl:     HYDROmorphone (DILAUDID) 2 MG tablet, Take 1 tablet (2 mg) by mouth every 6 hours as needed for pain, Disp: 30 tablet, Rfl: 0    levothyroxine (SYNTHROID/LEVOTHROID) 112 MCG tablet, Take 112 mcg by mouth daily, Disp: , Rfl:     loratadine (CLARITIN) 10 MG tablet, , Disp: , Rfl:     Multiple Vitamins-Minerals (MULTIVITAMIN ADULT EXTRA C PO), Take 1 tablet by mouth every 24 hours, Disp: , Rfl:     naloxone (NARCAN) 4 MG/0.1ML nasal spray, Spray 1 spray (4 mg) into one nostril alternating nostrils as needed for opioid reversal every 2-3 minutes until assistance arrives, Disp: 0.2 mL, Rfl: 1    Nutritional Supplements (SALMON OIL) CAPS, Take 2 capsules by mouth daily , Disp: , Rfl:     omeprazole (PRILOSEC) 20 MG DR capsule, Take 20 mg by  mouth daily, Disp: , Rfl:     oxyCODONE (ROXICODONE) 5 MG tablet, Take 1-2 tablets (5-10 mg) by mouth every 4 hours as needed for pain, Disp: 40 tablet, Rfl: 0    prochlorperazine (COMPAZINE) 10 MG tablet, Take 1 tablet (10 mg) by mouth every 6 hours as needed for nausea or vomiting, Disp: 90 tablet, Rfl: 1    tamsulosin (FLOMAX) 0.4 MG capsule, Take 1 capsule (0.4 mg) by mouth daily, Disp: 30 capsule, Rfl: 3    triamterene-HCTZ (MAXZIDE-25) 37.5-25 MG tablet, Take 1 tablet by mouth daily, Disp: , Rfl:     Physical Exam:   /72   Pulse (!) 48   Temp 98  F (36.7  C) (Oral)   Resp 16   Wt 121.1 kg (267 lb)   SpO2 97%   BMI 36.21 kg/m    General: Patient appears well in no acute distress.   Skin: No visualized rash or lesions on visualized skin  Eyes: EOMI, no erythema, sclera icterus or discharge noted  Resp: Appears to be breathing comfortably without accessory muscle usage, speaking in full sentences, no cough  MSK: Appears to have normal range of motion based on visualized movements  Neurologic: No apparent tremors, facial movements symmetric  Psych: affect bright, alert and oriented    ECOG PS 1.    LABORATORY DATA:  Most Recent 3 CBC's:  Recent Labs   Lab Test 08/25/23  1219 07/18/23  0818 06/07/23  0852   WBC 6.1 3.9* 5.7   HGB 11.8* 12.1* 12.4*   MCV 90 89 92    220 245   ANEUTAUTO 4.4 2.2 3.3     Most Recent 3 BMP's:  Recent Labs   Lab Test 08/28/23  1200 08/25/23  1219 07/18/23  0818 06/07/23  0852    139 138 137   POTASSIUM 4.0 4.0 4.0 4.2   CHLORIDE 101 100 102 101   CO2 23 25 25 25   BUN 24.8* 17.0 18.4 21.3   CR 1.01 1.05 0.93 1.10   ANIONGAP 13 14 11 11   BETHANY 9.3 9.3 8.9 9.2   * 106* 109* 98   PROTTOTAL 7.4  --  6.6 6.9   ALBUMIN 4.4  --  4.2 4.3    Most Recent 3 LFT's:  Recent Labs   Lab Test 08/28/23  1200 07/18/23  0818 06/07/23  0852   AST 24 28 23   ALT 15 22 25   ALKPHOS 166* 114 95   BILITOTAL 0.3 0.2 0.2    Most Recent 2 TSH and T4:  Recent Labs   Lab Test  "07/07/21  0834 03/31/21  0824   TSH 1.67 1.63       I reviewed the above labs today.    PSA trend, see oncology history.     8/25/23 Xrays significant for enlarging L femur mass, no clear fracture.      ASSESSMENT & PLAN: Mr. Reyes is a delightful 63 year old gentleman with metastatic castration sensitive prostate adenocarcinoma as well as an incidentally diagnosed stage III clear-cell renal cell carcinoma of the right kidney (s/p radical R nephrectomy 3/19/19), who is here for a follow-up visit and initiation of docetafor now metastatic castration-resistant prostate cancer.     1. Metastatic castration-resistant prostate cancer, with involvement of the bones and RPLN:   - Patient met the criteria for CHAARTED \"high-volume\" metastatic hormone-sensitive prostate cancer. He opted for docetaxel in combination with ADT (per JOSEFA, GETUG-AFU15, FELIPEEDSEEMA). He was subsequently treated with docetaxel x6 doses in the [facility] setting and enzalutamide.  He initiated Pluvicto in May 2023 with an initial slight decline in PSA, but this began to rise just prior to Cycle 3.  Given his XR evidence of progression in L femur and PSA rise, we will re-evaluate his staging with PSMA PET scan and determine the next steps for therapy if a transition is warranted.  Reconsented for  [employer] on 8/28/23.    -Proceed with pluvicto dose #3.   -Zometa next due 11/2023 along with Lupron.   -PSMA PET in the interval.   -Palliative to manage further opiate refills.   -Trial of dexamethasone, then stop.  Restart if worsening pain.      2. Bone metastases:   - Noted to have osseous metastatic disease at the time of diagnosis. S/p radiation to C spine and sacrum (Re-irradiation to sacrum).   - worsening radiculopathy down starting in the low back and radiating down the left leg in June. MRI read was without new/acute abnormalities  - Pain continues to fluctuate but overall well controlled/managable   - pain mgmt per palliative care, on " butrans patch now with addition of dilaudid.    - Encouraged to continue calcium and vitamin D supplementation; continue Zometa 4mg IV every ~3 months. Due next in November 2023.   -Referral for Radiation oncology for consideration of RT to painful L femur lesion in August 2023.  Ortho referral for eval for possible stabilization.       3. Stage 3, UISS-high risk ccRCC: s/p R nephrectomy   - approximately 40-50% risk of recurrence after definitive surgery.   - no clear evidence of residual disease at this time; the retroperitoneal lymphadenopathy is more consistent with metastatic prostate cancer   - Continue active surveillance with self-reporting for signs/symptoms of recurrence (this was previously explained in detail) and periodic H&P and scans.    PLAN:     Continue dexamethasone, then stop.  If pain worsens, we will restart.   I have refilled your dilaudid for a month.  Palliative care will refill moving forward.   Proceed with Pluvicto dose #3 as scheduled.   Radiation oncology referral for consideration of treatment of your L femur mass.   PSMA PET scan before you see me next.    Orthopedic referral for evaluation of your L femur mass.  MRI of the femur prior to that visit.    See me back on 10/2 to discuss progress and whether to continue Pluvicto or switch treatments.      A total of 75 minutes were spent on this patient on the day of the encounter, of which more than 50% of this time was used for counseling and coordination of care.  The patient and his wife were given the opportunity to ask multiple questions today, all of which were answered to their satisfaction.     Omar Mcnair MD, PhD   of Medicine   Oncology/BMT/Cellular Therapies

## 2023-08-28 NOTE — NURSING NOTE
"Chief Complaint   Patient presents with    Port Draw     Labs drawn via port. VS taken.     Port accessed with 20g 3/4\" power needle and labs drawn by rn.  Port flushed with NS and heparin.  De-accessed. Pt tolerated well.  VS taken.  Pt checked in for next appt.    CBC and PSA were drawn on 8/25/23 while pt was at [facility]. We decided to not collect those labs today.    Albert Conteh RN  "

## 2023-08-29 ENCOUNTER — PATIENT OUTREACH (OUTPATIENT)
Dept: ONCOLOGY | Facility: CLINIC | Age: 63
End: 2023-08-29

## 2023-08-29 ENCOUNTER — TELEPHONE (OUTPATIENT)
Dept: ORTHOPEDICS | Facility: CLINIC | Age: 63
End: 2023-08-29

## 2023-08-29 ASSESSMENT — ENCOUNTER SYMPTOMS
MUSCLE CRAMPS: 0
NECK PAIN: 0
MUSCLE WEAKNESS: 1
STIFFNESS: 0
MYALGIAS: 0
BACK PAIN: 1
ARTHRALGIAS: 0
JOINT SWELLING: 0

## 2023-08-29 NOTE — PROGRESS NOTES
New Patient Radiation Oncology Nurse Navigator Note     Referring provider:   Omar Mcnair MD   Oncology Adult  [facility]     Referred to (specialty): Radiation Oncology    Date Referral Received:   8/28/23     Evaluation for :   L femur mass with significant pain.  Evaluate for possible SBRT.     Clinical History (per Nurse review of records provided):    Patient with metastatic castration-sensitive prostate cancer and incidentally diagnosed stage 3 clear cell RCC (s/p radical R nephrectomy on 3/19/19). See Dr. Mcnair's progress notes for full history.      Records Location (Care Everywhere, Media, etc.):   Epic     Referral reviewed and sent to new patient scheduling to schedule following plan:  SCHEDULE: with a Rad onc listed for palliative care/pain, @ [facility], [facility], in person, within 2 weeks.

## 2023-08-29 NOTE — PROGRESS NOTES
[id number] : Informed Consent Note     The consent form, including purpose, risks and benefits, was reviewed with Walter Reyes, and all questions were answered before he signed the consent form. The patient understands that the study involves an active treatment phase as well as a post-treatment follow up phase.     Present during the discussion was Micheline Reyes, his wife, who also signed as witness. A copy of the signed form was provided to the patient. No procedures specific to this study were performed prior to the patient signing the consent form.    Consent Version Date: 07/13/2023  Consent obtained by: Omar Mcnair    Date: 8/28/2023  HIPAA authorization signed?: yes  HIPAA authorization version date: 05/13/2021    Omar Mcnair MD    Form 503.03.01 (Version 2)     Effective date: 01AUG2018     Next Review Date: 01AUG2020

## 2023-08-29 NOTE — PATIENT INSTRUCTIONS
Walter,   Here is what we discussed at your appointment.     Continue dexamethasone, then stop.  If pain worsens, we will restart.   I have refilled your dilaudid for a month.  Palliative care will refill moving forward.   Proceed with Pluvicto dose #3 as scheduled.   Radiation oncology referral for consideration of treatment of your L femur mass.   PSMA PET scan before you see me next.    Orthopedic referral for evaluation of your L femur mass.  MRI of the femur prior to that visit.    See me back on 10/2 to discuss progress and whether to continue Pluvicto or switch treatments.

## 2023-08-29 NOTE — TELEPHONE ENCOUNTER
[facility] Call Center    Phone Message    May a detailed message be left on voicemail: yes     Reason for Call: Other: Could not schedule patient within 3 days for Malignant neoplasm of prostate metastatic to bone. Please call him back at [phone]. Patient is also wondering if MRI should be completed before appointment.     Action Taken: Message routed to:  [facility] ([facility]): [phone]    Travel Screening: Not Applicable

## 2023-08-30 LAB — TESTOST SERPL-MCNC: 2 NG/DL (ref 240–950)

## 2023-08-30 NOTE — TELEPHONE ENCOUNTER
DIAGNOSIS: Left Femur Mass  Malignant neoplasm of prostate metastatic to bone (H) [C61, C79.51]   APPOINTMENT DATE: 09/05/2023   NOTES STATUS DETAILS   OFFICE NOTE from referring provider Internal 08/28/2023 - Omar Mcnair MD - [facility] Oncology   OFFICE NOTE from other specialist Internal    DISCHARGE REPORT from the ER Internal 08/25/2023 - PENNY Garcia   MEDICATION LIST Internal    LABS     IMAGING Internal

## 2023-08-30 NOTE — TELEPHONE ENCOUNTER
MEDICAL RECORDS REQUEST   Radiation Oncology  [address] [place] [address]  Fax: [phone]          FUTURE VISIT INFORMATION                                                   Walter DELORIS Reyes, : 1960 scheduled for future visit at [facility] Radiation Oncology    RECORDS REQUESTED FOR VISIT                                                     SOFT TISSUE     OFFICE NOTE from medical oncologist Epic 23: Dr. Omar Mcnair  23: Dr. Jj Winters   CHEMOTHERAPY NOTES/LOG Epic Most recent 8/3/23   MEDICATION LIST [facility]    LABS     GNOMONIC TESTINGS External [place]:  3/5/19:  [id number]    [facility]is:  19: [id number]   PATHOLOGY REPORTS Report in [facility] Path Consult:  19: [id number]    ANYTHING RELATED TO DIAGNOSIS Epic    IMAGING (NEED IMAGES & REPORT)     BONE SCANS PACS 22-5/3/19   XRAYS PACS 23: XR Femur  23: XR Knee   PET PACS 22: PET Eyes to Thigh   PREVIOUS RADIATION     WHAT [facility] FAC[facility]   RADIATION ONCOLOGIST NOTES [facility] 3/19/23: Dr. Brody Lizarraga   RADIATION TREATMENT SUMMARY NOTES     RADIATION TREATMENT PLAN     DVH = DOSE VOLUME HISTOGRAM     DICOM CD (TX PLANNING CT, TX PLAN, STRUCTURE SET) *If no DICOM avail ask for DRJosh     *[facility] and [facility] Radiation On[facility] patients send to [facility] with ATTN: JANNA/Ryland Dosi/Physics    *only [facility] patient's, use [facility]

## 2023-08-30 NOTE — TELEPHONE ENCOUNTER
Patient was called and notified of the following:    No new imaging needed.  He should be using a cane or walker at all times to protect his weight bearing.  Delmy Lynch LPN

## 2023-09-01 NOTE — PROGRESS NOTES
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 Reyes was seen for initial consultation in the Department of Radiation Oncology. All available pertinent medical records, radiographic and laboratory studies were reviewed.    HISTORY OF PRESENT ILLNESS:   Mr. Reyes is a 63 year old man with diagnosis of de deja metastatic prostate adenocarcinoma, initially diagnosed in early 2019. At that time, he was found to have multifocal bony metastases. He started ADT on 2/15/2019 and docetaxel on 5/8/2019.  Subsequent scans demonstrated disease response. He started Zometa on 4/8/2020.    He later had bony disease progression and received radiation to C7, 30 Gray in 10 fractions in August 2021; sacrum, 25 Mueller in 5 fractions in April 2022; sacrum retreatment, 7 Gray in a single fraction in February 2023.    He started Pluvicto in May 2023.    Recently, he presented to the ER on 8/25/23 with left leg pain. XR showed a 2 cm sclerotic lesion involving the left femoral diaphysis, which appears larger in comparison to PSMA PET from 11/14/2022. He was discharged with dexamethasone 4 mg daily x1 week and Dilaudid. He saw Dr. Mcnair on 8/28/23, who referred him to radiation oncology and orthopedic surgery.    He saw Dr. Yun on 9/5/23, who felt an impending fracture is unlikely present. There is a MRI scheduled - if stress reaction or impending fracture is demonstrated then he will have a follow-up with Dr. Yun.    Today, he presents for a video visit. He reports pain of the left leg is now much better controlled. He is currently using a Butrans patch.       PAST MEDICAL HISTORY:   Past Medical History:   Diagnosis Date    Cancer of kidney, right (H)     Complication of anesthesia     slow wakeup     Malignant neoplasm of prostate  metastatic to bone (H) 2/14/2019    Thyroid disease        PAST SURGICAL HISTORY:   Past Surgical History:   Procedure Laterality Date    CATARACT EXTRACTION, BILATERAL      CYSTOSCOPY      about 10 years ago    INSERT PORT VASCULAR ACCESS Right 05/02/2019    Procedure: Chest Port Placement;  Surgeon: Tate Burciaga PA-C;  Location: [facility] OR    IR CHEST PORT PLACEMENT > 5 YRS OF AGE  05/02/2019    LAPAROSCOPIC NEPHRECTOMY Right 03/19/2019    Procedure: Right laparoscopic radical nephrectomy;  Surgeon: Wesley Cleveland MD;  Location: [facility] OR       CHEMOTHERAPY HISTORY: As above    PAST RADIATION THERAPY HISTORY: As above     IMPLANTABLE CARDIAC DEVICE: None    MEDICATIONS:   Current Outpatient Medications   Medication Sig Dispense Refill    atorvastatin (LIPITOR) 20 MG tablet Take 60 mg by mouth daily       buprenorphine (BUTRANS) 20 MCG/HR WK patch Place 1 patch onto the skin every 7 days 4 patch 0    calcium citrate-vitamin D (CITRACAL) 315-250 MG-UNIT TABS per tablet Take 650 mg by mouth 2 times daily       cycloSPORINE (RESTASIS) 0.05 % ophthalmic emulsion Place 1 drop into both eyes 2 times daily       gabapentin (NEURONTIN) 300 MG capsule Take 1 capsule (300 mg) by mouth 2 times daily AND 2 capsules (600 mg) At Bedtime. 120 capsule 1    Glucosamine-Chondroit-Vit C-Mn (GLUCOSAMINE 1500 COMPLEX) CAPS Take 1 capsule by mouth daily      HYDROmorphone (DILAUDID) 2 MG tablet Take 1 tablet (2 mg) by mouth every 6 hours as needed for pain 30 tablet 0    levothyroxine (SYNTHROID/LEVOTHROID) 112 MCG tablet Take 112 mcg by mouth daily      loratadine (CLARITIN) 10 MG tablet       Multiple Vitamins-Minerals (MULTIVITAMIN ADULT EXTRA C PO) Take 1 tablet by mouth every 24 hours      naloxone (NARCAN) 4 MG/0.1ML nasal spray Spray 1 spray (4 mg) into one nostril alternating nostrils as needed for opioid reversal every 2-3 minutes until assistance arrives 0.2 mL 1    Nutritional Supplements (SALMON OIL) CAPS Take  2 capsules by mouth daily       omeprazole (PRILOSEC) 20 MG DR capsule Take 20 mg by mouth daily      oxyCODONE (ROXICODONE) 5 MG tablet Take 1-2 tablets (5-10 mg) by mouth every 4 hours as needed for pain 40 tablet 0    prochlorperazine (COMPAZINE) 10 MG tablet Take 1 tablet (10 mg) by mouth every 6 hours as needed for nausea or vomiting 90 tablet 1    tamsulosin (FLOMAX) 0.4 MG capsule Take 1 capsule (0.4 mg) by mouth daily 30 capsule 3    triamterene-HCTZ (MAXZIDE-25) 37.5-25 MG tablet Take 1 tablet by mouth daily          ALLERGIES:  Allergies as of 09/08/2023 - Reviewed 09/08/2023   Allergen Reaction Noted    Amlodipine Other (See Comments) 12/09/2022       SOCIAL HISTORY:    Social History     Socioeconomic History    Marital status:      Spouse name: Not on file    Number of children: 4    Years of education: Not on file    Highest education level: Not on file   Occupational History    Not on file   Tobacco Use    Smoking status: Never    Smokeless tobacco: Never   Substance and Sexual Activity    Alcohol use: Yes     Comment: wine - very rare    Drug use: No    Sexual activity: Not on file   Other Topics Concern    Parent/sibling w/ CABG, MI or angioplasty before 65F 55M? Not Asked   Social History Narrative    Not on file     Social Determinants of Health     Financial Resource Strain: Not on file   Food Insecurity: Not on file   Transportation Needs: Not on file   Physical Activity: Not on file   Stress: Not on file   Social Connections: Not on file   Intimate Partner Violence: Not on file   Housing Stability: Not on file       FAMILY HISTORY:   Family History   Problem Relation Age of Onset    Diabetes Father        REVIEW OF SYMPTOMS:  A full 14-point review of systems was performed. All pertinent positives and negatives are noted in HPI.    PHYSICAL EXAMINATION:    Video visit  Gen: Appears healthy, in NAD, normal speech and mentation    IMAGING/PATH: As noted in HPI.    ASSESSMENT AND PLAN:   Walter Reyes is a 63 year old man with diagnosis of metastatic castration-resistant prostate cancer with multiple bone metastases; he recently required an ER visit for pain involving a left femur metastasis confirmed by X-ray. He has a MRI scheduled for 9/12/23.     We reviewed the recent imaging and discussed the role of palliative radiation therapy. We will follow-up on the left femur MR scheduled for next week before making a definitive treatment plan. Should the MR demonstrate need for surgical stabilization, then we would deliver radiotherapy post-operatively. Should he not require a surgical procedure, then palliative radiotherapy would be recommended for pain control and local disease control.     Radiotherapy would be delivered in 5-10 fractions. The potential risks were reviewed.    We will follow-up with Mr. Reyes next week.     Thank you for allowing us to participate in this patient's care.  Please feel free to call with any questions or concerns.        Mercy Albert MD PGY5  Department of Radiation Oncology  [facility]      Physician Attestation   I, Lexis Albert, saw this patient and agree with the findings and plan of care as documented in the note.   I was present for key portions of the history and physical exam. Briefly, this is a 63 year old gentleman with a history of metastatic castration-resistant prostate cancer with multiple bone metastases, with a symptomatic lesion of the left femur for which we discussed the role for palliative radiotherapy. He has an MRI scheduled on 9/12/2023, at which time Dr. Yun in Orthopedic Surgery will comment on the role for prophylactic stabilization. If performed, we would consider postoperative radiotherapy. If not performed, we would perform palliative radiotherapy. In either case, this would be delivered using 5-10 fractions to 20-30 Gy. The palliative intent of radiotherapy was reviewed, and Mr. Reyes expressed  understanding.    We appreciate the opportunity to participate in Mr. Reyes's care. He was encouraged to contact me with questions or concerns should they arise.    I personally reviewed the available Xray images. Laboratory data and pathology as noted above was reviewed. I reviewed previous medical records, which are summarized in the HPI. A total of 60 minutes were spent (including face to face time) during this visit, and more than 50% of the time was spent in counseling and coordination of care.     Lexis Albert MD MPH PhD    Department of Radiation Oncology

## 2023-09-05 ENCOUNTER — HOSPITAL ENCOUNTER (OUTPATIENT)
Dept: NUCLEAR MEDICINE | Facility: CLINIC | Age: 63
Setting detail: NUCLEAR MEDICINE
Discharge: HOME | End: 2023-09-05
Attending: INTERNAL MEDICINE | Admitting: INTERNAL MEDICINE
Payer: COMMERCIAL

## 2023-09-05 ENCOUNTER — PRE VISIT (OUTPATIENT)
Dept: ORTHOPEDICS | Facility: CLINIC | Age: 63
End: 2023-09-05

## 2023-09-05 ENCOUNTER — TELEPHONE (OUTPATIENT)
Dept: RADIATION ONCOLOGY | Facility: CLINIC | Age: 63
End: 2023-09-05

## 2023-09-05 ENCOUNTER — VIRTUAL VISIT (OUTPATIENT)
Dept: ORTHOPEDICS | Facility: CLINIC | Age: 63
End: 2023-09-05
Payer: COMMERCIAL

## 2023-09-05 VITALS — BODY MASS INDEX: 34.95 KG/M2 | HEIGHT: 72 IN | WEIGHT: 258 LBS

## 2023-09-05 DIAGNOSIS — C61 MALIGNANT NEOPLASM OF PROSTATE METASTATIC TO BONE (H): ICD-10-CM

## 2023-09-05 DIAGNOSIS — C79.51 MALIGNANT NEOPLASM OF PROSTATE METASTATIC TO BONE (H): ICD-10-CM

## 2023-09-05 DIAGNOSIS — C79.51 MALIGNANT NEOPLASM OF PROSTATE METASTATIC TO BONE (H): Primary | ICD-10-CM

## 2023-09-05 DIAGNOSIS — C61 MALIGNANT NEOPLASM OF PROSTATE METASTATIC TO BONE (H): Primary | ICD-10-CM

## 2023-09-05 DIAGNOSIS — C61 PROSTATE CANCER (H): ICD-10-CM

## 2023-09-05 DIAGNOSIS — M54.10 RADICULOPATHY, UNSPECIFIED SPINAL REGION: ICD-10-CM

## 2023-09-05 PROCEDURE — A9607 HC RX 344: HCPCS | Mod: JZ | Performed by: INTERNAL MEDICINE

## 2023-09-05 PROCEDURE — 99203 OFFICE O/P NEW LOW 30 MIN: CPT | Mod: VID | Performed by: ORTHOPAEDIC SURGERY

## 2023-09-05 PROCEDURE — 79101 NUCLEAR RX IV ADMIN: CPT

## 2023-09-05 PROCEDURE — 344N000001 HC RX 344: Mod: JZ | Performed by: INTERNAL MEDICINE

## 2023-09-05 PROCEDURE — 999N000248 HC STATISTIC IV INSERT WITH US BY RN

## 2023-09-05 PROCEDURE — 79101 NUCLEAR RX IV ADMIN: CPT | Mod: 26 | Performed by: RADIOLOGY

## 2023-09-05 RX ORDER — ONDANSETRON 8 MG/1
8 TABLET, FILM COATED ORAL
Status: DISCONTINUED | OUTPATIENT
Start: 2023-09-05 | End: 2023-09-06 | Stop reason: HOSPADM

## 2023-09-05 RX ORDER — ALBUTEROL SULFATE 0.83 MG/ML
2.5 SOLUTION RESPIRATORY (INHALATION)
Status: DISCONTINUED | OUTPATIENT
Start: 2023-09-05 | End: 2023-09-06 | Stop reason: HOSPADM

## 2023-09-05 RX ORDER — BUPRENORPHINE 20 UG/H
1 PATCH TRANSDERMAL
Qty: 4 PATCH | Refills: 0 | Status: SHIPPED | OUTPATIENT
Start: 2023-09-05 | End: 2023-09-28

## 2023-09-05 RX ORDER — ALBUTEROL SULFATE 90 UG/1
1-2 AEROSOL, METERED RESPIRATORY (INHALATION)
Status: DISCONTINUED | OUTPATIENT
Start: 2023-09-05 | End: 2023-09-06 | Stop reason: HOSPADM

## 2023-09-05 RX ORDER — METHYLPREDNISOLONE SODIUM SUCCINATE 125 MG/2ML
125 INJECTION, POWDER, LYOPHILIZED, FOR SOLUTION INTRAMUSCULAR; INTRAVENOUS
Status: DISCONTINUED | OUTPATIENT
Start: 2023-09-05 | End: 2023-09-06 | Stop reason: HOSPADM

## 2023-09-05 RX ORDER — PROCHLORPERAZINE MALEATE 10 MG
10 TABLET ORAL EVERY 6 HOURS PRN
Status: DISCONTINUED | OUTPATIENT
Start: 2023-09-05 | End: 2023-09-06 | Stop reason: HOSPADM

## 2023-09-05 RX ORDER — EPINEPHRINE 1 MG/ML
0.3 INJECTION, SOLUTION, CONCENTRATE INTRAVENOUS EVERY 5 MIN PRN
Status: DISCONTINUED | OUTPATIENT
Start: 2023-09-05 | End: 2023-09-06 | Stop reason: HOSPADM

## 2023-09-05 RX ORDER — LORAZEPAM 2 MG/ML
.5-1 INJECTION INTRAMUSCULAR EVERY 6 HOURS PRN
Status: DISCONTINUED | OUTPATIENT
Start: 2023-09-05 | End: 2023-09-06 | Stop reason: HOSPADM

## 2023-09-05 RX ORDER — DIPHENHYDRAMINE HYDROCHLORIDE 50 MG/ML
50 INJECTION INTRAMUSCULAR; INTRAVENOUS
Status: DISCONTINUED | OUTPATIENT
Start: 2023-09-05 | End: 2023-09-06 | Stop reason: HOSPADM

## 2023-09-05 RX ORDER — LORAZEPAM 0.5 MG/1
.5-1 TABLET ORAL EVERY 6 HOURS PRN
Status: DISCONTINUED | OUTPATIENT
Start: 2023-09-05 | End: 2023-09-06 | Stop reason: HOSPADM

## 2023-09-05 RX ORDER — MEPERIDINE HYDROCHLORIDE 25 MG/ML
25 INJECTION INTRAMUSCULAR; INTRAVENOUS; SUBCUTANEOUS EVERY 30 MIN PRN
Status: DISCONTINUED | OUTPATIENT
Start: 2023-09-05 | End: 2023-09-06 | Stop reason: HOSPADM

## 2023-09-05 RX ADMIN — LUTETIUM LU 177 VIPIVOTIDE TETRAXETAN 200 MILLICURIE: 27 INJECTION, SOLUTION INTRAVENOUS at 10:41

## 2023-09-05 ASSESSMENT — PAIN SCALES - GENERAL: PAINLEVEL: NO PAIN (0)

## 2023-09-05 NOTE — NURSING NOTE
Is the patient currently in the state of [place]? YES    Visit mode:VIDEO    If the visit is dropped, the patient can be reconnected by: VIDEO VISIT: Text to cell phone:   Telephone Information:   Mobile [phone]       Will anyone else be joining the visit? NO  (If patient encounters technical issues they should call [phone]    How would you like to obtain your AVS? MyChart    Are changes needed to the allergy or medication list? Pt stated no changes to allergies and Pt stated no med changes    Reason for visit: Consult    Heidi KAY

## 2023-09-05 NOTE — TELEPHONE ENCOUNTER
Good morning Dr. Albert     Pt came in today wanting to know if he should come in for his appt this Friday 9/8  He had [place] Radiotherapy today and was told he should be minimum 3ft away from people for 3-5 days due to the radioactivity. They preferred him to not have any appts but they wanted to check with you.  Would he be OK to be seen in person this Friday or should he (1) switch to video or (2) reschedule for another day?   Thank you

## 2023-09-05 NOTE — TELEPHONE ENCOUNTER
Received [employer] message from patient requesting refill of butrans patch.     Last refill: 8/2/23  Last office visit: 8/2/23  Scheduled for follow up 9/20/23     Will route request to MD for review.     Reviewed [place]  Report.

## 2023-09-05 NOTE — LETTER
9/5/2023         RE: Walter Reyes  [id number]  [facility] [address]        Dear Colleague,    Thank you for referring your patient, Walter Reyes, to the [facility] [place]. Please see a copy of my visit note below.    Virtual Visit Details    Type of service:  Video Visit     Assessment: Metastatic prostate cancer with a likely set of symptoms in the left mid thigh secondary to a solitary bone metastasis.  Unlikely that an impending fracture is present.    Plan: 1.  Jono has an MRI ordered in 2 weeks of the left femur.  If this does not show any evidence of stress reaction or impending fracture it is my opinion that he should proceed to receive radiation treatment.  2.  If the MRI scan does show stress reaction or impending fracture we will need to have another follow-up virtual visit and talk about treatment options.  Passing an intramedullary nail could be quite difficult given the sclerotic intramedullary metastasis that is currently present.    Patient is a 63-year-old male with metastatic prostate cancer.  His prostate metastasis by his report have been limited to his skeleton.  He reports that last Thursday he had a cute onset of left mid thigh discomfort.  After having several episodes he went to the emergency department where he was evaluated.  The metastatic lesion in the left femur was identified.  Importantly in his past history he has had 2 neurogenic pain in the lumbar spine which has responded twice to external beam radiation.    His past medical history is complex and is outlined to some degree in his electronic health record.  Of note he has also had metastatic renal cell cancer.    Likewise his medications are numerous and outlined in his electronic health record.    I reviewed his recent x-rays and pelvic MRI scan.  There are several skeletal lesions identified.  These include his L5-S1, left mid femur, right ilium, left ilium  and left femoral neck.  To my review none of these lesions show evidence of a risk of pathologic fracture as they are all sclerotic and then the long bones none them involve the cortex.    I reviewed with Alvino and his wife Micheline I think he is not at risk for a fracture but would like to ensure that with an MRI scan.  He mentioned that he is scheduled for an MRI scan in 2 weeks.  I discussed the algorithm moving forward as outlined above and the plan.    This was a [facility] video visit.  Began at 4:12 PM and ended at 4:34 PM.  The total visit was 22 minutes.  Patient was in his home in [place].  The provider was in his office on the [facility].      Symptoms you have experienced in the last 30 days (Submitted on 8/29/2023)  General Symptoms: No  Skin Symptoms: No  HENT Symptoms: No  EYE SYMPTOMS: No  HEART SYMPTOMS: No  LUNG SYMPTOMS: No  INTESTINAL SYMPTOMS: No  URINARY SYMPTOMS: No  REPRODUCTIVE SYMPTOMS: No  SKELETAL SYMPTOMS: Yes  BLOOD SYMPTOMS: No  NERVOUS SYSTEM SYMPTOMS: No  MENTAL HEALTH SYMPTOMS: No  Please answer the questions below to tell us what condition you are experiencing: (Submitted on 8/29/2023)  Back pain: Yes  Muscle aches: No  Neck pain: No  Swollen joints: No  Joint pain: No  Bone pain: Yes  Muscle cramps: No  Muscle weakness: Yes  Joint stiffness: No  Bone fracture: No        Vladimir Yun MD

## 2023-09-05 NOTE — PROGRESS NOTES
Virtual Visit Details    Type of service:  Video Visit     Assessment: Metastatic prostate cancer with a likely set of symptoms in the left mid thigh secondary to a solitary bone metastasis.  Unlikely that an impending fracture is present.    Plan: 1.  Jono has an MRI ordered in 2 weeks of the left femur.  If this does not show any evidence of stress reaction or impending fracture it is my opinion that he should proceed to receive radiation treatment.  2.  If the MRI scan does show stress reaction or impending fracture we will need to have another follow-up virtual visit and talk about treatment options.  Passing an intramedullary nail could be quite difficult given the sclerotic intramedullary metastasis that is currently present.    Patient is a 63-year-old male with metastatic prostate cancer.  His prostate metastasis by his report have been limited to his skeleton.  He reports that last Thursday he had a cute onset of left mid thigh discomfort.  After having several episodes he went to the emergency department where he was evaluated.  The metastatic lesion in the left femur was identified.  Importantly in his past history he has had 2 neurogenic pain in the lumbar spine which has responded twice to external beam radiation.    His past medical history is complex and is outlined to some degree in his electronic health record.  Of note he has also had metastatic renal cell cancer.    Likewise his medications are numerous and outlined in his electronic health record.    I reviewed his recent x-rays and pelvic MRI scan.  There are several skeletal lesions identified.  These include his L5-S1, left mid femur, right ilium, left ilium and left femoral neck.  To my review none of these lesions show evidence of a risk of pathologic fracture as they are all sclerotic and then the long bones none them involve the cortex.    I reviewed with Alvino and his wife Micheline I think he is not at risk for a fracture but would like to  ensure that with an MRI scan.  He mentioned that he is scheduled for an MRI scan in 2 weeks.  I discussed the algorithm moving forward as outlined above and the plan.    This was a [facility] video visit.  Began at 4:12 PM and ended at 4:34 PM.  The total visit was 22 minutes.  Patient was in his home in [place].  The provider was in his office on the [facility].      Symptoms you have experienced in the last 30 days (Submitted on 8/29/2023)  General Symptoms: No  Skin Symptoms: No  HENT Symptoms: No  EYE SYMPTOMS: No  HEART SYMPTOMS: No  LUNG SYMPTOMS: No  INTESTINAL SYMPTOMS: No  URINARY SYMPTOMS: No  REPRODUCTIVE SYMPTOMS: No  SKELETAL SYMPTOMS: Yes  BLOOD SYMPTOMS: No  NERVOUS SYSTEM SYMPTOMS: No  MENTAL HEALTH SYMPTOMS: No  Please answer the questions below to tell us what condition you are experiencing: (Submitted on 8/29/2023)  Back pain: Yes  Muscle aches: No  Neck pain: No  Swollen joints: No  Joint pain: No  Bone pain: Yes  Muscle cramps: No  Muscle weakness: Yes  Joint stiffness: No  Bone fracture: No

## 2023-09-08 ENCOUNTER — PRE VISIT (OUTPATIENT)
Dept: RADIATION ONCOLOGY | Facility: CLINIC | Age: 63
End: 2023-09-08

## 2023-09-08 ENCOUNTER — VIRTUAL VISIT (OUTPATIENT)
Dept: RADIATION ONCOLOGY | Facility: CLINIC | Age: 63
End: 2023-09-08
Attending: STUDENT IN AN ORGANIZED HEALTH CARE EDUCATION/TRAINING PROGRAM
Payer: COMMERCIAL

## 2023-09-08 DIAGNOSIS — C79.51 MALIGNANT NEOPLASM OF PROSTATE METASTATIC TO BONE (H): ICD-10-CM

## 2023-09-08 DIAGNOSIS — C61 MALIGNANT NEOPLASM OF PROSTATE METASTATIC TO BONE (H): ICD-10-CM

## 2023-09-08 PROCEDURE — 99215 OFFICE O/P EST HI 40 MIN: CPT | Mod: VID | Performed by: RADIOLOGY

## 2023-09-08 ASSESSMENT — ENCOUNTER SYMPTOMS
RESPIRATORY NEGATIVE: 1
CARDIOVASCULAR NEGATIVE: 1
NEUROLOGICAL NEGATIVE: 1
CONSTITUTIONAL NEGATIVE: 1
MUSCULOSKELETAL NEGATIVE: 1
EYES NEGATIVE: 1
NAUSEA: 1
PSYCHIATRIC NEGATIVE: 1

## 2023-09-08 NOTE — LETTER
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 Reyes was seen for initial consultation in the Department of Radiation Oncology. All available pertinent medical records, radiographic and laboratory studies were reviewed.    HISTORY OF PRESENT ILLNESS:   Mr. Reyes is a 63 year old man with diagnosis of de djea metastatic prostate adenocarcinoma, initially diagnosed in early 2019. At that time, he was found to have multifocal bony metastases. He started ADT on 2/15/2019 and docetaxel on 5/8/2019.  Subsequent scans demonstrated disease response. He started Zometa on 4/8/2020.    He later had bony disease progression and received radiation to C7, 30 Gray in 10 fractions in August 2021; sacrum, 25 Mueller in 5 fractions in April 2022; sacrum retreatment, 7 Gray in a single fraction in February 2023.    He started Pluvicto in May 2023.    Recently, he presented to the ER on 8/25/23 with left leg pain. XR showed a 2 cm sclerotic lesion involving the left femoral diaphysis, which appears larger in comparison to PSMA PET from 11/14/2022. He was discharged with dexamethasone 4 mg daily x1 week and Dilaudid. He saw Dr. Mcnair on 8/28/23, who referred him to radiation oncology and orthopedic surgery.    He saw Dr. Yun on 9/5/23, who felt an impending fracture is unlikely present. There is a MRI scheduled - if stress reaction or impending fracture is demonstrated then he will have a follow-up with Dr. Yun.    Today, he presents for a video visit. He reports pain of the left leg is now much better controlled. He is currently using a Butrans patch.       PAST MEDICAL HISTORY:   Past Medical History:   Diagnosis Date    Cancer of  kidney, right (H)     Complication of anesthesia     slow wakeup     Malignant neoplasm of prostate metastatic to bone (H) 2/14/2019    Thyroid disease        PAST SURGICAL HISTORY:   Past Surgical History:   Procedure Laterality Date    CATARACT EXTRACTION, BILATERAL      CYSTOSCOPY      about 10 years ago    INSERT PORT VASCULAR ACCESS Right 05/02/2019    Procedure: Chest Port Placement;  Surgeon: Tate Burciaga PA-C;  Location: [facility]    IR CHEST PORT PLACEMENT > 5 YRS OF AGE  05/02/2019    LAPAROSCOPIC NEPHRECTOMY Right 03/19/2019    Procedure: Right laparoscopic radical nephrectomy;  Surgeon: Wesley Cleveland MD;  Location: [facility] OR       CHEMOTHERAPY HISTORY: As above    PAST RADIATION THERAPY HISTORY: As above     IMPLANTABLE CARDIAC DEVICE: None    MEDICATIONS:   Current Outpatient Medications   Medication Sig Dispense Refill    atorvastatin (LIPITOR) 20 MG tablet Take 60 mg by mouth daily       buprenorphine (BUTRANS) 20 MCG/HR WK patch Place 1 patch onto the skin every 7 days 4 patch 0    calcium citrate-vitamin D (CITRACAL) 315-250 MG-UNIT TABS per tablet Take 650 mg by mouth 2 times daily       cycloSPORINE (RESTASIS) 0.05 % ophthalmic emulsion Place 1 drop into both eyes 2 times daily       gabapentin (NEURONTIN) 300 MG capsule Take 1 capsule (300 mg) by mouth 2 times daily AND 2 capsules (600 mg) At Bedtime. 120 capsule 1    Glucosamine-Chondroit-Vit C-Mn (GLUCOSAMINE 1500 COMPLEX) CAPS Take 1 capsule by mouth daily      HYDROmorphone (DILAUDID) 2 MG tablet Take 1 tablet (2 mg) by mouth every 6 hours as needed for pain 30 tablet 0    levothyroxine (SYNTHROID/LEVOTHROID) 112 MCG tablet Take 112 mcg by mouth daily      loratadine (CLARITIN) 10 MG tablet       Multiple Vitamins-Minerals (MULTIVITAMIN ADULT EXTRA C PO) Take 1 tablet by mouth every 24 hours      naloxone (NARCAN) 4 MG/0.1ML nasal spray Spray 1 spray (4 mg) into one nostril alternating nostrils as needed for opioid reversal  every 2-3 minutes until assistance arrives 0.2 mL 1    Nutritional Supplements (SALMON OIL) CAPS Take 2 capsules by mouth daily       omeprazole (PRILOSEC) 20 MG DR capsule Take 20 mg by mouth daily      oxyCODONE (ROXICODONE) 5 MG tablet Take 1-2 tablets (5-10 mg) by mouth every 4 hours as needed for pain 40 tablet 0    prochlorperazine (COMPAZINE) 10 MG tablet Take 1 tablet (10 mg) by mouth every 6 hours as needed for nausea or vomiting 90 tablet 1    tamsulosin (FLOMAX) 0.4 MG capsule Take 1 capsule (0.4 mg) by mouth daily 30 capsule 3    triamterene-HCTZ (MAXZIDE-25) 37.5-25 MG tablet Take 1 tablet by mouth daily          ALLERGIES:  Allergies as of 09/08/2023 - Reviewed 09/08/2023   Allergen Reaction Noted    Amlodipine Other (See Comments) 12/09/2022       SOCIAL HISTORY:    Social History     Socioeconomic History    Marital status:      Spouse name: Not on file    Number of children: 4    Years of education: Not on file    Highest education level: Not on file   Occupational History    Not on file   Tobacco Use    Smoking status: Never    Smokeless tobacco: Never   Substance and Sexual Activity    Alcohol use: Yes     Comment: wine - very rare    Drug use: No    Sexual activity: Not on file   Other Topics Concern    Parent/sibling w/ CABG, MI or angioplasty before 65F 55M? Not Asked   Social History Narrative    Not on file     Social Determinants of Health     Financial Resource Strain: Not on file   Food Insecurity: Not on file   Transportation Needs: Not on file   Physical Activity: Not on file   Stress: Not on file   Social Connections: Not on file   Intimate Partner Violence: Not on file   Housing Stability: Not on file       FAMILY HISTORY:   Family History   Problem Relation Age of Onset    Diabetes Father        REVIEW OF SYMPTOMS:  A full 14-point review of systems was performed. All pertinent positives and negatives are noted in HPI.    PHYSICAL EXAMINATION:    Video visit  Gen: Appears  healthy, in NAD, normal speech and mentation    IMAGING/PATH: As noted in HPI.    ASSESSMENT AND PLAN: Mr. Walter Reyes is a 63 year old man with diagnosis of metastatic castration-resistant prostate cancer with multiple bone metastases; he recently required an ER visit for pain involving a left femur metastasis confirmed by X-ray. He has a MRI scheduled for 9/12/23.     We reviewed the recent imaging and discussed the role of palliative radiation therapy. We will follow-up on the left femur MR scheduled for next week before making a definitive treatment plan. Should the MR demonstrate need for surgical stabilization, then we would deliver radiotherapy post-operatively. Should he not require a surgical procedure, then palliative radiotherapy would be recommended for pain control and local disease control.     Radiotherapy would be delivered in 5-10 fractions. The potential risks were reviewed.    We will follow-up with Mr. Reyes next week.     Thank you for allowing us to participate in this patient's care.  Please feel free to call with any questions or concerns.        Mercy Albert MD PGY5  Department of Radiation Oncology  [facility]      Physician Attestation   I, Lexis Albert, saw this patient and agree with the findings and plan of care as documented in the note.   I was present for key portions of the history and physical exam. Briefly, this is a 63 year old gentleman with a history of metastatic castration-resistant prostate cancer with multiple bone metastases, with a symptomatic lesion of the left femur for which we discussed the role for palliative radiotherapy. He has an MRI scheduled on 9/12/2023, at which time Dr. Yun in Orthopedic Surgery will comment on the role for prophylactic stabilization. If performed, we would consider postoperative radiotherapy. If not performed, we would perform palliative radiotherapy. In either case, this would be delivered using 5-10  fractions to 20-30 Gy. The palliative intent of radiotherapy was reviewed, and Mr. Reyes expressed understanding.    We appreciate the opportunity to participate in Mr. Reyes's care. He was encouraged to contact me with questions or concerns should they arise.    I personally reviewed the available Xray images. Laboratory data and pathology as noted above was reviewed. I reviewed previous medical records, which are summarized in the HPI. A total of 60 minutes were spent (including face to face time) during this visit, and more than 50% of the time was spent in counseling and coordination of care.     Lexis Albert MD MPH PhD    Department of Radiation Oncology          INITIAL PATIENT ASSESSMENT    Diagnosis: Metastatic prostate cancer with symptomatic bone metastases involving the left femur      Prior radiation therapy:   Site Treated: C7   Sacrum 700 cGy completed 02/28/23  Facility: [facility]  Dates: completed 8/18/21  Dose: 3000 cGy    Site Treated: Sacrum   Facility: [facility]  Dates: completed 4/18/22  Dose: 2500 cGy    Site Treated: Sacrum   Facility: [facility]  Dates: completed 2/28/23  Dose:700 cGy    Prior chemotherapy: See medical record for patient's full chemotherapy history. Pt is currently on his 3rd cycle of 6 for Pluvicto.    Prior hormonal therapy:Yes: Lupron every 3 months    Pain Eval:  Current history of pain associated with this visit:   Intensity: 1/10  Current: aching  Location: L femur  Treatment: Pt states since his 7 day course of decadron 4 mg, which ended last Friday, his pain has substantially improved.  He wears a butran's patch and is requiring no narcotics and rarely takes tylenol.     Psychosocial  Living arrangements: wife  Fall Risk: independent   referral needs: Not needed    Advanced Directive: No  Implantable Cardiac Device? No    LMP: No LMP for male patient.    Virtual Visit Details    Type of service:  Video Visit     Originating  "Location (pt. Location): Home    Distant Location (provider location):  On-site  Platform used for Video Visit: [employer]               Review of Systems   Constitutional: Negative.    HENT: Negative.     Eyes: Negative.    Respiratory: Negative.     Cardiovascular: Negative.    Gastrointestinal:  Positive for nausea.        Intermittent following administration of Pluvicto injections.    Genitourinary:         Patient stated his \"urination is normal for what is normal for someone with prostate cancer.\"    Musculoskeletal: Negative.    Skin: Negative.    Neurological: Negative.    Endo/Heme/Allergies: Negative.    Psychiatric/Behavioral: Negative.           Lexis Albert  "

## 2023-09-08 NOTE — PROGRESS NOTES
"  INITIAL PATIENT ASSESSMENT    Diagnosis: Metastatic prostate cancer with symptomatic bone metastases involving the left femur      Prior radiation therapy:   Site Treated: C7   Sacrum 700 cGy completed 02/28/23  Facility: [facility]  Dates: completed 8/18/21  Dose: 3000 cGy    Site Treated: Sacrum   Facility: [facility]  Dates: completed 4/18/22  Dose: 2500 cGy    Site Treated: Sacrum   Facility: [facility]  Dates: completed 2/28/23  Dose:700 cGy    Prior chemotherapy: See medical record for patient's full chemotherapy history. Pt is currently on his 3rd cycle of 6 for Pluvicto.    Prior hormonal therapy:Yes: Lupron every 3 months    Pain Eval:  Current history of pain associated with this visit:   Intensity: 1/10  Current: aching  Location: L femur  Treatment: Pt states since his 7 day course of decadron 4 mg, which ended last Friday, his pain has substantially improved.  He wears a butran's patch and is requiring no narcotics and rarely takes tylenol.     Psychosocial  Living arrangements: wife  Fall Risk: independent   referral needs: Not needed    Advanced Directive: No  Implantable Cardiac Device? No    LMP: No LMP for male patient.    Virtual Visit Details    Type of service:  Video Visit     Originating Location (pt. Location): Home    Distant Location (provider location):  On-site  Platform used for Video Visit: [employer]               Review of Systems   Constitutional: Negative.    HENT: Negative.     Eyes: Negative.    Respiratory: Negative.     Cardiovascular: Negative.    Gastrointestinal:  Positive for nausea.        Intermittent following administration of Pluvicto injections.    Genitourinary:         Patient stated his \"urination is normal for what is normal for someone with prostate cancer.\"    Musculoskeletal: Negative.    Skin: Negative.    Neurological: Negative.    Endo/Heme/Allergies: Negative.    Psychiatric/Behavioral: Negative.                 "

## 2023-09-08 NOTE — LETTER
9/8/2023         RE: Walter Reyes  [address] [facility] [address]        Dear Colleague,    Thank you for referring your patient, Walter Reyes, to the [facility] RADIATION ONCOLOGY. Please see a copy of my visit note below.    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 Reyes was seen for initial consultation in the Department of Radiation Oncology. All available pertinent medical records, radiographic and laboratory studies were reviewed.    HISTORY OF PRESENT ILLNESS:   Mr. Reyes is a 63 year old man with diagnosis of de deja metastatic prostate adenocarcinoma, initially diagnosed in early 2019. At that time, he was found to have multifocal bony metastases. He started ADT on 2/15/2019 and docetaxel on 5/8/2019.  Subsequent scans demonstrated disease response. He started Zometa on 4/8/2020.    He later had bony disease progression and received radiation to C7, 30 Gray in 10 fractions in August 2021; sacrum, 25 Mueller in 5 fractions in April 2022; sacrum retreatment, 7 Gray in a single fraction in February 2023.    He started Pluvicto in May 2023.    Recently, he presented to the ER on 8/25/23 with left leg pain. XR showed a 2 cm sclerotic lesion involving the left femoral diaphysis, which appears larger in comparison to PSMA PET from 11/14/2022. He was discharged with dexamethasone 4 mg daily x1 week and Dilaudid. He saw Dr. Mcnair on 8/28/23, who referred him to radiation oncology and orthopedic surgery.    He saw Dr. Yun on 9/5/23, who felt an impending fracture is unlikely present. There is a MRI scheduled - if stress reaction or impending fracture is demonstrated then he will have a follow-up with Dr. Yun.    Today, he presents for a video visit. He  reports pain of the left leg is now much better controlled. He is currently using a Butrans patch.       PAST MEDICAL HISTORY:   Past Medical History:   Diagnosis Date    Cancer of kidney, right (H)     Complication of anesthesia     slow wakeup     Malignant neoplasm of prostate metastatic to bone (H) 2/14/2019    Thyroid disease        PAST SURGICAL HISTORY:   Past Surgical History:   Procedure Laterality Date    CATARACT EXTRACTION, BILATERAL      CYSTOSCOPY      about 10 years ago    INSERT PORT VASCULAR ACCESS Right 05/02/2019    Procedure: Chest Port Placement;  Surgeon: Tate Burciaga PA-C;  Location: [facility] OR    IR CHEST PORT PLACEMENT > 5 YRS OF AGE  05/02/2019    LAPAROSCOPIC NEPHRECTOMY Right 03/19/2019    Procedure: Right laparoscopic radical nephrectomy;  Surgeon: Wesley Cleveland MD;  Location: [facility] OR       CHEMOTHERAPY HISTORY: As above    PAST RADIATION THERAPY HISTORY: As above     IMPLANTABLE CARDIAC DEVICE: None    MEDICATIONS:   Current Outpatient Medications   Medication Sig Dispense Refill    atorvastatin (LIPITOR) 20 MG tablet Take 60 mg by mouth daily       buprenorphine (BUTRANS) 20 MCG/HR WK patch Place 1 patch onto the skin every 7 days 4 patch 0    calcium citrate-vitamin D (CITRACAL) 315-250 MG-UNIT TABS per tablet Take 650 mg by mouth 2 times daily       cycloSPORINE (RESTASIS) 0.05 % ophthalmic emulsion Place 1 drop into both eyes 2 times daily       gabapentin (NEURONTIN) 300 MG capsule Take 1 capsule (300 mg) by mouth 2 times daily AND 2 capsules (600 mg) At Bedtime. 120 capsule 1    Glucosamine-Chondroit-Vit C-Mn (GLUCOSAMINE 1500 COMPLEX) CAPS Take 1 capsule by mouth daily      HYDROmorphone (DILAUDID) 2 MG tablet Take 1 tablet (2 mg) by mouth every 6 hours as needed for pain 30 tablet 0    levothyroxine (SYNTHROID/LEVOTHROID) 112 MCG tablet Take 112 mcg by mouth daily      loratadine (CLARITIN) 10 MG tablet       Multiple Vitamins-Minerals (MULTIVITAMIN ADULT EXTRA  C PO) Take 1 tablet by mouth every 24 hours      naloxone (NARCAN) 4 MG/0.1ML nasal spray Spray 1 spray (4 mg) into one nostril alternating nostrils as needed for opioid reversal every 2-3 minutes until assistance arrives 0.2 mL 1    Nutritional Supplements (SALMON OIL) CAPS Take 2 capsules by mouth daily       omeprazole (PRILOSEC) 20 MG DR capsule Take 20 mg by mouth daily      oxyCODONE (ROXICODONE) 5 MG tablet Take 1-2 tablets (5-10 mg) by mouth every 4 hours as needed for pain 40 tablet 0    prochlorperazine (COMPAZINE) 10 MG tablet Take 1 tablet (10 mg) by mouth every 6 hours as needed for nausea or vomiting 90 tablet 1    tamsulosin (FLOMAX) 0.4 MG capsule Take 1 capsule (0.4 mg) by mouth daily 30 capsule 3    triamterene-HCTZ (MAXZIDE-25) 37.5-25 MG tablet Take 1 tablet by mouth daily          ALLERGIES:  Allergies as of 09/08/2023 - Reviewed 09/08/2023   Allergen Reaction Noted    Amlodipine Other (See Comments) 12/09/2022       SOCIAL HISTORY:    Social History     Socioeconomic History    Marital status:      Spouse name: Not on file    Number of children: 4    Years of education: Not on file    Highest education level: Not on file   Occupational History    Not on file   Tobacco Use    Smoking status: Never    Smokeless tobacco: Never   Substance and Sexual Activity    Alcohol use: Yes     Comment: wine - very rare    Drug use: No    Sexual activity: Not on file   Other Topics Concern    Parent/sibling w/ CABG, MI or angioplasty before 65F 55M? Not Asked   Social History Narrative    Not on file     Social Determinants of Health     Financial Resource Strain: Not on file   Food Insecurity: Not on file   Transportation Needs: Not on file   Physical Activity: Not on file   Stress: Not on file   Social Connections: Not on file   Intimate Partner Violence: Not on file   Housing Stability: Not on file       FAMILY HISTORY:   Family History   Problem Relation Age of Onset    Diabetes Father        REVIEW  OF SYMPTOMS:  A full 14-point review of systems was performed. All pertinent positives and negatives are noted in HPI.    PHYSICAL EXAMINATION:    Video visit  Gen: Appears healthy, in NAD, normal speech and mentation    IMAGING/PATH: As noted in HPI.    ASSESSMENT AND PLAN: Mr. Walter Reyes is a 63 year old man with diagnosis of metastatic castration-resistant prostate cancer with multiple bone metastases; he recently required an ER visit for pain involving a left femur metastasis confirmed by X-ray. He has a MRI scheduled for 9/12/23.     We reviewed the recent imaging and discussed the role of palliative radiation therapy. We will follow-up on the left femur MR scheduled for next week before making a definitive treatment plan. Should the MR demonstrate need for surgical stabilization, then we would deliver radiotherapy post-operatively. Should he not require a surgical procedure, then palliative radiotherapy would be recommended for pain control and local disease control.     Radiotherapy would be delivered in 5-10 fractions. The potential risks were reviewed.    We will follow-up with Mr. Reyes next week.     Thank you for allowing us to participate in this patient's care.  Please feel free to call with any questions or concerns.        Mercy Albert MD PGY5  Department of Radiation Oncology  [facility]      Physician Attestation   I, Lexis Albert, saw this patient and agree with the findings and plan of care as documented in the note.   I was present for key portions of the history and physical exam. Briefly, this is a 63 year old gentleman with a history of metastatic castration-resistant prostate cancer with multiple bone metastases, with a symptomatic lesion of the left femur for which we discussed the role for palliative radiotherapy. He has an MRI scheduled on 9/12/2023, at which time Dr. Yun in Orthopedic Surgery will comment on the role for prophylactic stabilization. If  performed, we would consider postoperative radiotherapy. If not performed, we would perform palliative radiotherapy. In either case, this would be delivered using 5-10 fractions to 20-30 Gy. The palliative intent of radiotherapy was reviewed, and Mr. Reyes expressed understanding.    We appreciate the opportunity to participate in Mr. Reyes's care. He was encouraged to contact me with questions or concerns should they arise.    I personally reviewed the available Xray images. Laboratory data and pathology as noted above was reviewed. I reviewed previous medical records, which are summarized in the HPI. A total of 60 minutes were spent (including face to face time) during this visit, and more than 50% of the time was spent in counseling and coordination of care.     Lexis Albert MD MPH PhD    Department of Radiation Oncology          INITIAL PATIENT ASSESSMENT    Diagnosis: Metastatic prostate cancer with symptomatic bone metastases involving the left femur      Prior radiation therapy:   Site Treated: C7   Sacrum 700 cGy completed 02/28/23  Facility: [facility]  Dates: completed 8/18/21  Dose: 3000 cGy    Site Treated: Sacrum   Facility: [facility]  Dates: completed 4/18/22  Dose: 2500 cGy    Site Treated: Sacrum   Facility: [facility]  Dates: completed 2/28/23  Dose:700 cGy    Prior chemotherapy: See medical record for patient's full chemotherapy history. Pt is currently on his 3rd cycle of 6 for Pluvicto.    Prior hormonal therapy:Yes: Lupron every 3 months    Pain Eval:  Current history of pain associated with this visit:   Intensity: 1/10  Current: aching  Location: L femur  Treatment: Pt states since his 7 day course of decadron 4 mg, which ended last Friday, his pain has substantially improved.  He wears a butran's patch and is requiring no narcotics and rarely takes tylenol.     Psychosocial  Living arrangements: wife  Fall Risk: independent   referral needs: Not  "needed    Advanced Directive: No  Implantable Cardiac Device? No    LMP: No LMP for male patient.    Virtual Visit Details    Type of service:  Video Visit     Originating Location (pt. Location): Home    Distant Location (provider location):  On-site  Platform used for Video Visit: [employer]               Review of Systems   Constitutional: Negative.    HENT: Negative.     Eyes: Negative.    Respiratory: Negative.     Cardiovascular: Negative.    Gastrointestinal:  Positive for nausea.        Intermittent following administration of Pluvicto injections.    Genitourinary:         Patient stated his \"urination is normal for what is normal for someone with prostate cancer.\"    Musculoskeletal: Negative.    Skin: Negative.    Neurological: Negative.    Endo/Heme/Allergies: Negative.    Psychiatric/Behavioral: Negative.                   Again, thank you for allowing me to participate in the care of your patient.        Sincerely,        Lexis Albert  "

## 2023-09-12 ENCOUNTER — ANCILLARY PROCEDURE (OUTPATIENT)
Dept: MRI IMAGING | Facility: CLINIC | Age: 63
End: 2023-09-12
Attending: STUDENT IN AN ORGANIZED HEALTH CARE EDUCATION/TRAINING PROGRAM
Payer: COMMERCIAL

## 2023-09-12 DIAGNOSIS — C79.51 MALIGNANT NEOPLASM OF PROSTATE METASTATIC TO BONE (H): ICD-10-CM

## 2023-09-12 DIAGNOSIS — C61 MALIGNANT NEOPLASM OF PROSTATE METASTATIC TO BONE (H): ICD-10-CM

## 2023-09-12 PROCEDURE — 255N000002 HC RX 255 OP 636: Mod: JZ | Performed by: STUDENT IN AN ORGANIZED HEALTH CARE EDUCATION/TRAINING PROGRAM

## 2023-09-12 PROCEDURE — A9585 GADOBUTROL INJECTION: HCPCS | Mod: JZ | Performed by: STUDENT IN AN ORGANIZED HEALTH CARE EDUCATION/TRAINING PROGRAM

## 2023-09-12 PROCEDURE — 73720 MRI LWR EXTREMITY W/O&W/DYE: CPT | Mod: LT

## 2023-09-12 RX ORDER — GADOBUTROL 604.72 MG/ML
10 INJECTION INTRAVENOUS ONCE
Status: COMPLETED | OUTPATIENT
Start: 2023-09-12 | End: 2023-09-12

## 2023-09-12 RX ADMIN — GADOBUTROL 10 ML: 604.72 INJECTION INTRAVENOUS at 14:12

## 2023-09-14 ENCOUNTER — TELEPHONE (OUTPATIENT)
Dept: ORTHOPEDICS | Facility: CLINIC | Age: 63
End: 2023-09-14

## 2023-09-20 ENCOUNTER — VIRTUAL VISIT (OUTPATIENT)
Dept: PALLIATIVE MEDICINE | Facility: CLINIC | Age: 63
End: 2023-09-20
Payer: COMMERCIAL

## 2023-09-20 VITALS — HEIGHT: 71 IN | WEIGHT: 256 LBS | BODY MASS INDEX: 35.84 KG/M2

## 2023-09-20 DIAGNOSIS — C61 MALIGNANT NEOPLASM OF PROSTATE METASTATIC TO BONE (H): Primary | ICD-10-CM

## 2023-09-20 DIAGNOSIS — C79.51 MALIGNANT NEOPLASM OF PROSTATE METASTATIC TO BONE (H): Primary | ICD-10-CM

## 2023-09-20 PROCEDURE — 99214 OFFICE O/P EST MOD 30 MIN: CPT | Mod: VID | Performed by: NURSE PRACTITIONER

## 2023-09-20 ASSESSMENT — PAIN SCALES - GENERAL: PAINLEVEL: MILD PAIN (2)

## 2023-09-20 NOTE — NURSING NOTE
Is the patient currently in the state of [place]? YES    Visit mode:VIDEO    If the visit is dropped, the patient can be reconnected by: VIDEO VISIT: Send to e-mail at: [email or web address]    Will anyone else be joining the visit? NO  (If patient encounters technical issues they should call [phone]    How would you like to obtain your AVS? MyChart    Are changes needed to the allergy or medication list? Pt declined allergy review and Pt declined med review    Reason for visit: MERCED KAY

## 2023-09-20 NOTE — PROGRESS NOTES
Virtual Visit Details    Type of service:  Video Visit   Video start: 10:11 AM  Video stop: 10:35 AM    Originating Location (pt. Location): Home    Distant Location (provider location):  On-site  Platform used for Video Visit: [facility]        Palliative Care Outpatient Clinic      Patient ID/Chief Complaint: Walter Reyes 63 year old male who is presenting to the palliative medicine clinic today at the request of Yamilet Espinoza PA-C for a palliative care follow-up secondary to metastatic prostate cancer.   The patient's primary care provider is:  Poncho Ortiz       Impression & Recommendations:  63-year-old male with metastatic prostate cancer with spinal and bone metastases.  Palliative care assistance requested for symptom management of chronic cancer associated back pain with radiculopathy in the legs.    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  High risk for recurrence.    Past medical history also includes hyperlipidemia, hypothyroidism, HTN, GERD, Acosta's neuroma, plantar fasciitis, peroneal tendinitis, and mild chemotherapy-induced polyneuropathy.    Radiculopathy in both legs, has improved on gabapentin.  He has constant pain over the sacrum and to the left of the sacrum.  Pain is constant, deep, aching.  Interferes with sleep. Pain worsens when reclining or lying down.    Symptoms/recommendations/discussion:  Continue Butrans 20 mcg/h weekly patch---reports good improvement in pain  Continue gabapentin 300 mg every morning and afternoon, 600 mg at bedtime (he may decrease bedtime dose to 300 mg for morning somnolence).  Continue oxycodone IR 5-10 mg every 4 hours as needed  Counseled to monitor for constipation  Counseled opioid safety, Narcan nasal spray ordered  Social situation includes residing in home with wife Micheline. Stable housing and food. No h/o substance abuse.   Palliative follow-up within the next 2  "monthsweeks        How to get a hold of us:  For non-urgent matters, MyChart works best.    For more urgent matters, or if you prefer not to use MyChart, call our clinic nurse coordinator Sherry Palma RN at [phone] or [phone]    We have an on-call number for evenings and weekends. Please call this only if you are having uncontrolled symptoms or serious side effects from your medicines: [phone].     For refills, please give us a week (5 working days) notice. We don't always have providers available everyday to do refills. If you call the day you run out of your medicine, we may not be able to refill it in time, so call 5 days in advance        History:  History gathered today from: patient, family/loved ones, medical chart, outside records including Care Everywhere      PE: Ht 1.803 m (5' 11\")   Wt 116.1 kg (256 lb)   BMI 35.70 kg/m     Wt Readings from Last 3 Encounters:   09/20/23 116.1 kg (256 lb)   09/05/23 117 kg (258 lb)   08/28/23 121.1 kg (267 lb)       Gen alert, comfortable appearing, NAD.   Head NCAT.  Eyes anicteric without injection  Face symmetric, eyes conjugate  Lungs unlabored, no cough, speaking full sentences  Skin no rashes or lesions evident on face/neck  Neuro Face symmetric, eyes conjugate; speech fluent.  Neuropsych exam normal including affect, sensorium, gross memory, thought processes, and fund of knowledge.         Data reviewed:  I reviewed electrolytes, BUN/creatinine, liver profile, hemoglobin and hematocrit, platelet count, and most recent imaging  Recent oncology notes     database reviewed: 8/2/2023:        38 minutes spent on the date of the encounter doing chart review, history and exam, patient education & counseling, documentation and other activities as noted above.        Thank you for involving us in the patient's care.   LATESHA Seaman, [facility]  "

## 2023-09-23 DIAGNOSIS — M54.10 RADICULOPATHY, UNSPECIFIED SPINAL REGION: ICD-10-CM

## 2023-09-23 DIAGNOSIS — C61 PROSTATE CANCER (H): ICD-10-CM

## 2023-09-25 ENCOUNTER — LAB (OUTPATIENT)
Dept: INFUSION THERAPY | Facility: CLINIC | Age: 63
End: 2023-09-25
Attending: STUDENT IN AN ORGANIZED HEALTH CARE EDUCATION/TRAINING PROGRAM
Payer: COMMERCIAL

## 2023-09-25 DIAGNOSIS — C61 MALIGNANT NEOPLASM OF PROSTATE METASTATIC TO BONE (H): ICD-10-CM

## 2023-09-25 DIAGNOSIS — C79.51 MALIGNANT NEOPLASM OF PROSTATE METASTATIC TO BONE (H): ICD-10-CM

## 2023-09-25 LAB
ALBUMIN SERPL BCG-MCNC: 4.1 G/DL (ref 3.5–5.2)
ALP SERPL-CCNC: 170 U/L (ref 40–129)
ALT SERPL W P-5'-P-CCNC: 17 U/L (ref 0–70)
ANION GAP SERPL CALCULATED.3IONS-SCNC: 12 MMOL/L (ref 7–15)
AST SERPL W P-5'-P-CCNC: 27 U/L (ref 0–45)
BASOPHILS # BLD AUTO: 0 10E3/UL (ref 0–0.2)
BASOPHILS NFR BLD AUTO: 1 %
BILIRUB SERPL-MCNC: 0.3 MG/DL
BUN SERPL-MCNC: 10.1 MG/DL (ref 8–23)
CALCIUM SERPL-MCNC: 8.8 MG/DL (ref 8.8–10.2)
CHLORIDE SERPL-SCNC: 103 MMOL/L (ref 98–107)
CREAT SERPL-MCNC: 0.98 MG/DL (ref 0.67–1.17)
DEPRECATED HCO3 PLAS-SCNC: 24 MMOL/L (ref 22–29)
EGFRCR SERPLBLD CKD-EPI 2021: 88 ML/MIN/1.73M2
EOSINOPHIL # BLD AUTO: 0.1 10E3/UL (ref 0–0.7)
EOSINOPHIL NFR BLD AUTO: 2 %
ERYTHROCYTE [DISTWIDTH] IN BLOOD BY AUTOMATED COUNT: 13.4 % (ref 10–15)
GLUCOSE SERPL-MCNC: 105 MG/DL (ref 70–99)
HCT VFR BLD AUTO: 35.5 % (ref 40–53)
HGB BLD-MCNC: 11.8 G/DL (ref 13.3–17.7)
IMM GRANULOCYTES # BLD: 0 10E3/UL
IMM GRANULOCYTES NFR BLD: 1 %
LYMPHOCYTES # BLD AUTO: 1 10E3/UL (ref 0.8–5.3)
LYMPHOCYTES NFR BLD AUTO: 29 %
MCH RBC QN AUTO: 29.7 PG (ref 26.5–33)
MCHC RBC AUTO-ENTMCNC: 33.2 G/DL (ref 31.5–36.5)
MCV RBC AUTO: 89 FL (ref 78–100)
MONOCYTES # BLD AUTO: 0.4 10E3/UL (ref 0–1.3)
MONOCYTES NFR BLD AUTO: 12 %
NEUTROPHILS # BLD AUTO: 1.9 10E3/UL (ref 1.6–8.3)
NEUTROPHILS NFR BLD AUTO: 55 %
NRBC # BLD AUTO: 0 10E3/UL
NRBC BLD AUTO-RTO: 0 /100
PLATELET # BLD AUTO: 232 10E3/UL (ref 150–450)
POTASSIUM SERPL-SCNC: 3.6 MMOL/L (ref 3.4–5.3)
PROT SERPL-MCNC: 6.5 G/DL (ref 6.4–8.3)
PSA SERPL DL<=0.01 NG/ML-MCNC: 2.21 NG/ML (ref 0–4.5)
RBC # BLD AUTO: 3.97 10E6/UL (ref 4.4–5.9)
SODIUM SERPL-SCNC: 139 MMOL/L (ref 136–145)
WBC # BLD AUTO: 3.4 10E3/UL (ref 4–11)

## 2023-09-25 PROCEDURE — 85041 AUTOMATED RBC COUNT: CPT | Performed by: STUDENT IN AN ORGANIZED HEALTH CARE EDUCATION/TRAINING PROGRAM

## 2023-09-25 PROCEDURE — 250N000011 HC RX IP 250 OP 636: Mod: JZ | Performed by: STUDENT IN AN ORGANIZED HEALTH CARE EDUCATION/TRAINING PROGRAM

## 2023-09-25 PROCEDURE — 84403 ASSAY OF TOTAL TESTOSTERONE: CPT | Performed by: STUDENT IN AN ORGANIZED HEALTH CARE EDUCATION/TRAINING PROGRAM

## 2023-09-25 PROCEDURE — 36415 COLL VENOUS BLD VENIPUNCTURE: CPT

## 2023-09-25 PROCEDURE — 84153 ASSAY OF PSA TOTAL: CPT | Performed by: STUDENT IN AN ORGANIZED HEALTH CARE EDUCATION/TRAINING PROGRAM

## 2023-09-25 PROCEDURE — 80053 COMPREHEN METABOLIC PANEL: CPT | Performed by: STUDENT IN AN ORGANIZED HEALTH CARE EDUCATION/TRAINING PROGRAM

## 2023-09-25 PROCEDURE — 85018 HEMOGLOBIN: CPT | Performed by: STUDENT IN AN ORGANIZED HEALTH CARE EDUCATION/TRAINING PROGRAM

## 2023-09-25 RX ORDER — HEPARIN SODIUM (PORCINE) LOCK FLUSH IV SOLN 100 UNIT/ML 100 UNIT/ML
5 SOLUTION INTRAVENOUS EVERY 8 HOURS
Status: DISCONTINUED | OUTPATIENT
Start: 2023-09-25 | End: 2023-09-25 | Stop reason: HOSPADM

## 2023-09-25 RX ORDER — GABAPENTIN 300 MG/1
CAPSULE ORAL
Qty: 120 CAPSULE | Refills: 3 | Status: SHIPPED | OUTPATIENT
Start: 2023-09-25 | End: 2024-02-05

## 2023-09-25 RX ADMIN — Medication 5 ML: at 10:22

## 2023-09-25 NOTE — TELEPHONE ENCOUNTER
Received electronic communication from pharmacy requesting a new order for gabapentin.    Last refill: 8/26/23  Last office visit: 9/20/23  Scheduled for follow up 10/21/23     Will route request to NP for review.     Reviewed [place]  Report.

## 2023-09-25 NOTE — PROGRESS NOTES
Nursing Note:  Walter Reyes presents today for port labs.    Patient seen by provider today: No   present during visit today: Not Applicable.    Note: N/A.    Intravenous Access:  Lab draw site port, Needle type pport, Gauge 20 3/4in.  Labs drawn without difficulty.  Implanted Port.    Discharge Plan:   Patient was discharged home.    CAPRICE MACKENZIE RN

## 2023-09-26 ENCOUNTER — HOSPITAL ENCOUNTER (OUTPATIENT)
Dept: PET IMAGING | Facility: CLINIC | Age: 63
Setting detail: NUCLEAR MEDICINE
Discharge: HOME | End: 2023-09-26
Attending: STUDENT IN AN ORGANIZED HEALTH CARE EDUCATION/TRAINING PROGRAM | Admitting: STUDENT IN AN ORGANIZED HEALTH CARE EDUCATION/TRAINING PROGRAM
Payer: COMMERCIAL

## 2023-09-26 ENCOUNTER — OFFICE VISIT (OUTPATIENT)
Dept: RADIATION ONCOLOGY | Facility: CLINIC | Age: 63
End: 2023-09-26
Attending: RADIOLOGY
Payer: COMMERCIAL

## 2023-09-26 ENCOUNTER — HOSPITAL ENCOUNTER (OUTPATIENT)
Dept: PET IMAGING | Facility: CLINIC | Age: 63
Discharge: HOME | End: 2023-09-26
Attending: STUDENT IN AN ORGANIZED HEALTH CARE EDUCATION/TRAINING PROGRAM | Admitting: STUDENT IN AN ORGANIZED HEALTH CARE EDUCATION/TRAINING PROGRAM
Payer: COMMERCIAL

## 2023-09-26 DIAGNOSIS — C61 MALIGNANT NEOPLASM OF PROSTATE METASTATIC TO BONE (H): ICD-10-CM

## 2023-09-26 DIAGNOSIS — C79.51 MALIGNANT NEOPLASM OF PROSTATE METASTATIC TO BONE (H): ICD-10-CM

## 2023-09-26 PROCEDURE — 77334 RADIATION TREATMENT AID(S): CPT | Mod: 26 | Performed by: RADIOLOGY

## 2023-09-26 PROCEDURE — 250N000011 HC RX IP 250 OP 636: Mod: JZ | Performed by: STUDENT IN AN ORGANIZED HEALTH CARE EDUCATION/TRAINING PROGRAM

## 2023-09-26 PROCEDURE — A9596 HC RX 343: HCPCS | Performed by: STUDENT IN AN ORGANIZED HEALTH CARE EDUCATION/TRAINING PROGRAM

## 2023-09-26 PROCEDURE — 77334 RADIATION TREATMENT AID(S): CPT | Performed by: RADIOLOGY

## 2023-09-26 PROCEDURE — 70491 CT SOFT TISSUE NECK W/DYE: CPT | Mod: 26 | Performed by: STUDENT IN AN ORGANIZED HEALTH CARE EDUCATION/TRAINING PROGRAM

## 2023-09-26 PROCEDURE — 99207 PR NO BILLABLE SERVICE THIS VISIT: CPT | Mod: 26 | Performed by: RADIOLOGY

## 2023-09-26 PROCEDURE — 999N000130 PET PSMA EYES TO THIGHS: Mod: PS

## 2023-09-26 PROCEDURE — 250N000011 HC RX IP 250 OP 636: Performed by: STUDENT IN AN ORGANIZED HEALTH CARE EDUCATION/TRAINING PROGRAM

## 2023-09-26 PROCEDURE — 78815 PET IMAGE W/CT SKULL-THIGH: CPT | Mod: 26 | Performed by: STUDENT IN AN ORGANIZED HEALTH CARE EDUCATION/TRAINING PROGRAM

## 2023-09-26 PROCEDURE — 343N000001 HC RX 343: Performed by: STUDENT IN AN ORGANIZED HEALTH CARE EDUCATION/TRAINING PROGRAM

## 2023-09-26 PROCEDURE — 70491 CT SOFT TISSUE NECK W/DYE: CPT

## 2023-09-26 PROCEDURE — 74177 CT ABD & PELVIS W/CONTRAST: CPT

## 2023-09-26 PROCEDURE — 77290 THER RAD SIMULAJ FIELD CPLX: CPT | Performed by: RADIOLOGY

## 2023-09-26 RX ORDER — HEPARIN SODIUM (PORCINE) LOCK FLUSH IV SOLN 100 UNIT/ML 100 UNIT/ML
500 SOLUTION INTRAVENOUS ONCE
Status: COMPLETED | OUTPATIENT
Start: 2023-09-26 | End: 2023-09-26

## 2023-09-26 RX ORDER — IOPAMIDOL 755 MG/ML
10-135 INJECTION, SOLUTION INTRAVASCULAR ONCE
Status: COMPLETED | OUTPATIENT
Start: 2023-09-26 | End: 2023-09-26

## 2023-09-26 RX ADMIN — KIT FOR THE PREPARATION OF GALLIUM GA 68 GOZETOTIDE INJECTION 5.8 MILLICURIE: KIT INTRAVENOUS at 08:32

## 2023-09-26 RX ADMIN — Medication 500 UNITS: at 09:51

## 2023-09-26 RX ADMIN — IOPAMIDOL 135 ML: 755 INJECTION, SOLUTION INTRAVENOUS at 09:34

## 2023-09-27 LAB — TESTOST SERPL-MCNC: 5 NG/DL (ref 240–950)

## 2023-09-28 DIAGNOSIS — C61 PROSTATE CANCER (H): Primary | ICD-10-CM

## 2023-09-28 DIAGNOSIS — C61 PROSTATE CANCER (H): ICD-10-CM

## 2023-09-28 DIAGNOSIS — M54.10 RADICULOPATHY, UNSPECIFIED SPINAL REGION: ICD-10-CM

## 2023-09-28 RX ORDER — BUPRENORPHINE 20 UG/H
1 PATCH TRANSDERMAL
Qty: 4 PATCH | Refills: 0 | Status: SHIPPED | OUTPATIENT
Start: 2023-09-28 | End: 2023-10-31

## 2023-09-28 RX ORDER — OXYCODONE HYDROCHLORIDE 5 MG/1
5-10 TABLET ORAL
Qty: 90 TABLET | Refills: 0 | Status: SHIPPED | OUTPATIENT
Start: 2023-09-28 | End: 2023-12-20

## 2023-09-28 RX ORDER — HYDROMORPHONE HYDROCHLORIDE 2 MG/1
2-4 TABLET ORAL EVERY 4 HOURS PRN
Qty: 60 TABLET | Refills: 0 | Status: SHIPPED | OUTPATIENT
Start: 2023-09-28 | End: 2023-09-28

## 2023-09-28 NOTE — TELEPHONE ENCOUNTER
Received [employer] message from patient requesting refill of butrans patch and hydromorphone. Pt requesting to change from oxycodone to hydromorphone. He feels hydromorphone lasts longer than oxycodone.     Last refill of butrans: 9/5/23  Last refill of hydromorphone: 8/31 (#30 tabs)  Last office visit: 9/20/23  Scheduled for follow up 10/21/23     Will route request to NP for review.     Reviewed [place]  Report.

## 2023-09-28 NOTE — TELEPHONE ENCOUNTER
Pt would like to change back to oxycodone for BTP and trying 4 mg of dilaudid earlier today and not finding it to be helpful. Will cancel prescription sent earlier for dilaudid and instead send a refill for oxycodone. Per discussion with Dean Zeng, will change dosing directions to Q 3 hr PRN instead of Q 4 hr PRN.    KM MckeonN, RN  Palliative Care Nurse Clinician    [phone] (Direct)  [phone] (Main)  [phone] (Appointment Scheduling)

## 2023-10-02 ENCOUNTER — ONCOLOGY VISIT (OUTPATIENT)
Dept: ONCOLOGY | Facility: CLINIC | Age: 63
End: 2023-10-02
Attending: NURSE PRACTITIONER
Payer: COMMERCIAL

## 2023-10-02 VITALS
SYSTOLIC BLOOD PRESSURE: 128 MMHG | BODY MASS INDEX: 36.4 KG/M2 | HEART RATE: 60 BPM | DIASTOLIC BLOOD PRESSURE: 73 MMHG | OXYGEN SATURATION: 96 % | TEMPERATURE: 98.4 F | RESPIRATION RATE: 16 BRPM | WEIGHT: 261 LBS

## 2023-10-02 DIAGNOSIS — C61 MALIGNANT NEOPLASM OF PROSTATE METASTATIC TO BONE (H): Primary | ICD-10-CM

## 2023-10-02 DIAGNOSIS — C79.51 MALIGNANT NEOPLASM OF PROSTATE METASTATIC TO BONE (H): Primary | ICD-10-CM

## 2023-10-02 PROCEDURE — 99213 OFFICE O/P EST LOW 20 MIN: CPT | Performed by: STUDENT IN AN ORGANIZED HEALTH CARE EDUCATION/TRAINING PROGRAM

## 2023-10-02 PROCEDURE — 99215 OFFICE O/P EST HI 40 MIN: CPT | Performed by: STUDENT IN AN ORGANIZED HEALTH CARE EDUCATION/TRAINING PROGRAM

## 2023-10-02 RX ORDER — DEXAMETHASONE 4 MG/1
4 TABLET ORAL DAILY
Qty: 7 TABLET | Refills: 1 | Status: SHIPPED | OUTPATIENT
Start: 2023-10-02 | End: 2023-11-09

## 2023-10-02 ASSESSMENT — PAIN SCALES - GENERAL: PAINLEVEL: MODERATE PAIN (4)

## 2023-10-02 NOTE — PROGRESS NOTES
[facility] Oncology Followup  Oncologist: Tabby  Oct 2, 2023    REASON FOR VISIT: Follow-up for metastatic castration-resistant prostate cancer.      INTERVAL HISTORY: Pending start of RT to L femur.  Pain is better controlled.  Thought dex really helped and would like to have another course for possible flare with RT start later this week.  Energy is the same.  Reviewed all scans today with images.  No new pains.  Discussed later this week that JAGRUTI was ending screening/consent on 10/13.  He prefers to move forward with next Pluvicto dose as discussed and then see what clinical trial options are available rather than rush with screening.      ONCOLOGY HISTORY: Mr. Walter Reyes is a 63 year old gentleman with a metastatic castration-sensitive prostate cancer and incidentally diagnosed stage 3 clear cell RCC (s/p radical R nephrectomy on 3/19/19) which is now 4.5+ years post-therapy without evidence of recurrence. His oncologic history is detailed below.     1/6/21  PSA = 0.29  3/31/21 PSA = 0.44  7/7/21  PSA = 0.47  11/2021 PSA = 1.05  -- Start XTANDI  12/16/21 PSA =0.22  2/11/22 PSA= 0.50  3/22/22 PSA=0.72  5/5/2022 PSA=1.79  7/11/2022 PSA=2.16 --Start cycle 1 docetaxel   8/22/22 PSA = 1.36  9/12/22 PSA = 1.09  10/24/22 Cycle #6 of Docetaxel. End of treatment  1/9/23  PSA =0.66  3/20/23  PSA=1.54  4/21/23 PSA = 1.81  T=15  5/22/23 C1 Pluvicto   06/07/23          PSA = 1.42  7/18/23 PSA=1.75, C2 Pluvicto   8/28/23 Transfer of care initial visit for Tabby.  PSA 2.06.  C3 Pluvicto scheduled for 8/30.  Proceed with dose as planned.  MR DELORIS femur, ortho referral, PSMA PET ordered for prior to follow-up.    10/2/23 Palliative RT to L femur metastasis complete.  PSMA PET with mixed response.  PSA 2.21.  RT to L femur  pending.  Continue with Dose #4 of Pluvicto despite mixed response to therapy.  Dex course for possible pain   Flare with RT.      ONCOLOGIC HISTORY:  1. De deja metastatic prostate  "adenocarcinoma, stage IV (M1b at diagnosis), high-volume, castration-sensitive:  - 12/13/2018: PSA found to be elevated to 9.1 ng/mL on a routine follow-up with primary care provider Dr. Naylor at [facility]. Prior PSA were 1.4 on 8/16/17, 2.4 on 6/28/16, 2.9 on 6/28/16, and as low as 0.4 on 3/26/2003.   - 1/08/2019: Consultation with Sarah Wilson CNP in Urology clinic. Repeat PSA 9.6.  - 1/16/2019: MRI prostate with contrast - \"This examination is characterized as PIRADS 5- very high probability. Clinically significant cancer is highly likely to be present. There is a large, invasive mass arising from the right peripheral zone and extending into the neurovascular bundle, seminal vesicle, and along the anterolateral right mesorectal fascia. Metastatic right external iliac lymph node. Metastatic lesion in the posterior/superior right acetabulum.\"  - 1/25/2019: CT abdomen and pelvis with IV contrast - \"1. Heterogeneous enhancing mass posteriorly in the upper pole of the right kidney measures 4.5 x 5.8 x 5.7 cm (AP by transverse by craniocaudal). It has a small nodular component extending posterior medially which abuts the right psoas muscle. This nodular extension measures 2.1 x 2.1 cm. Minimal stranding about the mass. No definite thrombus within the right renal vein. This renal mass is compatible with a renal cell carcinoma. A paraaortic lymph node situated immediately posterior to the left renal vein measures 1.1 cm in short axis, suspicious for metastasis. 2. A 2 cm right external iliac lymph node is also suspicious for metastases. 3. Multiple sclerotic osseous lesions suspicious for metastases. These include 0.8 and 0.6 cm sclerotic lesions laterally in the right iliac wing (images 53 and 62 respectively). Ill-defined groundglass density laterally in the right acetabulum measuring 1.7 x 1.2 cm corresponds with the lesion identified on MRI. A 0.4 cm groundglass density in the left acetabulum. Sclerotic " "lesion in the left femoral neck measures 0.9 x 1.6 cm (image 81). Sclerotic metastases would be more compatible with prostate metastases. 4. A 3 subcentimeter hepatic lesions are indeterminate. Metastases would be a consideration. These can be further characterized with liver MRI.\"  - 1/25/2019: NM bone scan - \"There is focal bony uptake in the left femoral neck, right acetabulum, right of midline at the S1 or L5 level of the spine, within multiple bilateral ribs, in the C7 or T1 level of the spine, and anteriorly within the skull. Findings are suspicious for metastases.\"  - 1/31/19: CT chest with contrast - \" No suspicious nodules in the chest.  Stable appearance of a 5.8 cm right renal mass concerning for renal carcinoma until proven otherwise.  Stable indeterminant subcentimeter hypodensities in the liver.  Multifocal osteoblastic metastasis including 2.5 cm lesion in the right fifth rib posteriorly and a 1.6 cm lesion in the right third rib posteriorly. No lytic lesions identified.\"  - 2/6/19: CT-guided right sclerotic fifth rib lesion biopsy - \"Metastatic carcinoma, consistent with prostate primary.  Immunohistochemical stains performed show the metastatic carcinoma stains positive for NKX3.1 (prostate marker), negative for STACIE 3 and PAX8, which supports the above diagnosis.\"  - 2/15/19: Started Casodex 50mg every day and consented for the biobanking protocol. 3/18/19 - stopped Casodex.  - 2/19/19: Case discussed in tumor board - recommendation for nephectomy for suspected malignant right renal mass.   - 2/26/19: Started Lupron 22.5mg every 3 months.   - 5/8/19: Started Docetaxel 75mg/m2 IV every 3 weeks. 06/18/19 - cycle 3, 7/10/19 - cycle 4, 07/31/19 - cycle 5, 08/21/19 - cycle 6.   - 10/2/19: Restaging CT CAP and NM Bone Scan showed improved osseous disease, no evidence of recurrent or new metastatic disease.  - 1/5/20: Restaging CT CAP and NM Bone Scan showed stable osseous disease, no evidence of " "recurrent or new metastatic disease.  - 4/6/20: NM bone scan with slightly improved uptake in known skeletal mets. CT C/A/P with contrast with stable osseous mets, no yusuf enlargement, no new visceral mets etc.  - 04/08/20: Zometa every 3 months.  - 10/5/20: CT C/A/P with contrast and NM bone scan - SD.   - 1/4/21: CT C/A/P with contrast and NM bone scan - SD but slight increase in right 5th rib tracer uptake and in posterior L5 sclerosis.   - 03/29/21: CT C/A/P with contrast - single new right sacral 8mm bone lesion (suspicious), other bone mets stable. No visceral/yusuf disease. Nephrectomy site without recurrence. NM bone scan - SD but \"increased uptake associated with the prior lesions of the lower  cervical spine, posterior right fourth rib and right L5 posterior arch elements. Otherwise unchanged uptake associated with the proximal left femur and left anterior fourth rib. Similar uptake of the left halux, possibly secondary to degenerative osteoarthritis or gout.\"      Radiation history:   C7         3,000 cGy       06 X      8/05/2021        8/18/2021        13       10  2 Sacrum          2,500 cGy       18 X      4/12/2022        4/18/2022         6               Sacrum retreat               700 cGy        18 X      2/28/2023        2/28/2023    L femur Pending for October 2023.     2. Stage III ([id number]), grade 3 of 4, clear-cell RCC of right kidney:  - Incidentally diagnosed as above.   - Underwent curative-intent robotic right radical nephrectomy with Dr. Wesley Cleveland on 3/19/19. No tumor spillage per op report.   - Path showed: \"Histologic Type: Clear cell renal cell carcinoma; Sarcomatoid Features: Not identified; Histologic Grade: Nucleolar grade 3 (WHO/ISUP). Extent Tumor Size: 4.3 cm. Microscopic Tumor Extension: Tumor extension into renal sinus (in vascular structures). Margins: Negative. Tumor Necrosis: Present; focal. Lymph-Vascular Invasion: Not identified.  Pathologic Staging (pTNM) Primary " "Tumor (pT): pT3a: Tumor extends into renal vein branches/renal sinus. Regional Lymph Nodes (pN): pNX. Number of Lymph Nodes Examined: 0 Distant Metastasis (pM): pM N/A.\"  - Restaging scans as above.    PAST MEDICAL HISTORY:  Past Medical History:   Diagnosis Date    Cancer of kidney, right (H)     Complication of anesthesia     slow wakeup     Malignant neoplasm of prostate metastatic to bone (H) 2/14/2019    Thyroid disease      CURRENT MEDICATIONS:   Current Outpatient Medications:     atorvastatin (LIPITOR) 20 MG tablet, Take 60 mg by mouth daily , Disp: , Rfl:     buprenorphine (BUTRANS) 20 MCG/HR WK patch, Place 1 patch onto the skin every 7 days, Disp: 4 patch, Rfl: 0    calcium citrate-vitamin D (CITRACAL) 315-250 MG-UNIT TABS per tablet, Take 650 mg by mouth 2 times daily , Disp: , Rfl:     cycloSPORINE (RESTASIS) 0.05 % ophthalmic emulsion, Place 1 drop into both eyes 2 times daily , Disp: , Rfl:     gabapentin (NEURONTIN) 300 MG capsule, TAKE ONE CAPSULE BY MOUTH TWICE DAILY AND 2 CAPSULES AT BEDTIME, Disp: 120 capsule, Rfl: 3    Glucosamine-Chondroit-Vit C-Mn (GLUCOSAMINE 1500 COMPLEX) CAPS, Take 1 capsule by mouth daily, Disp: , Rfl:     levothyroxine (SYNTHROID/LEVOTHROID) 112 MCG tablet, Take 112 mcg by mouth daily, Disp: , Rfl:     loratadine (CLARITIN) 10 MG tablet, , Disp: , Rfl:     Multiple Vitamins-Minerals (MULTIVITAMIN ADULT EXTRA C PO), Take 1 tablet by mouth every 24 hours, Disp: , Rfl:     naloxone (NARCAN) 4 MG/0.1ML nasal spray, Spray 1 spray (4 mg) into one nostril alternating nostrils as needed for opioid reversal every 2-3 minutes until assistance arrives, Disp: 0.2 mL, Rfl: 1    Nutritional Supplements (SALMON OIL) CAPS, Take 2 capsules by mouth daily , Disp: , Rfl:     omeprazole (PRILOSEC) 20 MG DR capsule, Take 20 mg by mouth daily, Disp: , Rfl:     oxyCODONE (ROXICODONE) 5 MG tablet, Take 1-2 tablets (5-10 mg) by mouth every 3 hours as needed for severe pain, Disp: 90 tablet, Rfl: " 0    prochlorperazine (COMPAZINE) 10 MG tablet, Take 1 tablet (10 mg) by mouth every 6 hours as needed for nausea or vomiting, Disp: 90 tablet, Rfl: 1    tamsulosin (FLOMAX) 0.4 MG capsule, Take 1 capsule (0.4 mg) by mouth daily, Disp: 30 capsule, Rfl: 3    triamterene-HCTZ (MAXZIDE-25) 37.5-25 MG tablet, Take 1 tablet by mouth daily, Disp: , Rfl:     Physical Exam:   There were no vitals taken for this visit.  General: Patient appears well in no acute distress.   Skin: No visualized rash or lesions on visualized skin  Eyes: EOMI, no erythema, sclera icterus or discharge noted  Resp: Appears to be breathing comfortably without accessory muscle usage, speaking in full sentences, no cough  MSK: Appears to have normal range of motion based on visualized movements  Neurologic: No apparent tremors, facial movements symmetric  Psych: affect bright, alert and oriented    ECOG PS 1.    LABORATORY DATA:  Most Recent 3 CBC's:  Recent Labs   Lab Test 09/25/23  1022 08/25/23  1219 07/18/23  0818   WBC 3.4* 6.1 3.9*   HGB 11.8* 11.8* 12.1*   MCV 89 90 89    220 220   ANEUTAUTO 1.9 4.4 2.2     Most Recent 3 BMP's:  Recent Labs   Lab Test 09/25/23  1022 08/28/23  1200 08/25/23  1219 07/18/23  0818    137 139 138   POTASSIUM 3.6 4.0 4.0 4.0   CHLORIDE 103 101 100 102   CO2 24 23 25 25   BUN 10.1 24.8* 17.0 18.4   CR 0.98 1.01 1.05 0.93   ANIONGAP 12 13 14 11   BETHANY 8.8 9.3 9.3 8.9   * 118* 106* 109*   PROTTOTAL 6.5 7.4  --  6.6   ALBUMIN 4.1 4.4  --  4.2    Most Recent 3 LFT's:  Recent Labs   Lab Test 09/25/23  1022 08/28/23  1200 07/18/23  0818   AST 27 24 28   ALT 17 15 22   ALKPHOS 170* 166* 114   BILITOTAL 0.3 0.3 0.2    Most Recent 2 TSH and T4:  Recent Labs   Lab Test 07/07/21  0834 03/31/21  0824   TSH 1.67 1.63       I reviewed the above labs today.    PSA trend, see oncology history.     8/25/23 Xrays significant for enlarging L femur mass, no clear fracture.      ASSESSMENT & PLAN: Mr. Reyes is  "a delightful 63 year old gentleman with metastatic castration sensitive prostate adenocarcinoma as well as an incidentally diagnosed stage III clear-cell renal cell carcinoma of the right kidney (s/p radical R nephrectomy 3/19/19), who is here for a follow-up visit and initiation of docetafor now metastatic castration-resistant prostate cancer.     1. Metastatic castration-resistant prostate cancer, with involvement of the bones and RPLN:   - Patient met the criteria for CHAARTED \"high-volume\" metastatic hormone-sensitive prostate cancer. He opted for docetaxel in combination with ADT (per JOSEFA, [id number], FELIPEEDE). He was subsequently treated with docetaxel x6 doses in the [facility] setting and enzalutamide.  He initiated Pluvicto in May 2023 with an initial slight decline in PSA, but this began to rise just prior to Cycle 3.  Given his XR evidence of progression in L femur and PSA rise, we re-evaluated his progression with PSMA PET scan in September 2023, with note of mixed response.  Reconsented for  [employer] on 8/28/23.  Due for 3 month collection at end of November 2023.    -Proceed with pluvicto dose #4.    -Zometa next due 11/2023 along with Lupron.   -Palliative to manage further opiate refills.   -Dexamethasone burst for RT and one on hand if needed for pain flares.   Let Oncology team know if this is used.    -See back 3 weeks prior to next Pluvicto dose to discuss whether to continue with additional doses of Pluvicto vs consideration of clinical trial.  Consents sent for JAGRUTI and [id number].  Note JAGRUTI is closing to enrollment on 10/13.  We discussed this by phone and will proceed with Pluvicto as planned and then consider available trials.  JORGE ALBERTO-280 should also be open in this window as well given lag of [id number] doses with NEERAJ period extended to 5 weeks.     2. Bone metastases:   - Noted to have osseous metastatic disease at the time of diagnosis. S/p radiation to C spine and sacrum " (Re-irradiation to sacrum).   - worsening radiculopathy down starting in the low back and radiating down the left leg in June. MRI read was without new/acute abnormalities  - Pain continues to fluctuate but overall well controlled/managable   - pain mgmt per palliative care, on butrans patch now with addition of dilaudid.    - Encouraged to continue calcium and vitamin D supplementation; continue Zometa 4mg IV every ~3 months. Due next in November 2023.   -Evaluated by ortho, no pending fx.  RT to start in early October.       3. Stage 3, UISS-high risk ccRCC: s/p R nephrectomy   - Nephrectomy 3/19/19 and noted incidentally.  He is now 4.5 years post-op without evidence for recurrence.   - At this point, there is a minimal risk of recurrence of his RCC given the time since surgery without the use of adjuvant immunotherapy. There is no suspicious adenopathy or other features on his most recent imaging studies.    - Will continue to monitor with imaging planned for prostate cancer.      PLAN:     Continue with next Pluvicto dose as scheduled.  Labs adequate and dose signed.   Return 3 weeks after next Pluvicto dose to assess symptoms and next steps in plan.   Dexamethasone burst as needed for pain control.      A total of 40 minutes were spent on this patient on the day of the encounter, of which more than 50% of this time was used for counseling and coordination of care.  The patient and his wife were given the opportunity to ask multiple questions today, all of which were answered to their satisfaction.     Omar Mcnair MD, PhD   of Medicine   Oncology/BMT/Cellular Therapies

## 2023-10-02 NOTE — LETTER
10/2/2023         RE: Walter Reyes  [address] [facility] [address]        Dear Colleague,    Thank you for referring your patient, Walter Reyes, to the [facility]ER [facility]. Please see a copy of my visit note below.        [facility] Oncology Followup  Oncologist: Tabby  Oct 2, 2023    REASON FOR VISIT: Follow-up for metastatic castration-resistant prostate cancer.      INTERVAL HISTORY: Pending start of RT to L femur.  Pain is better controlled.  Thought dex really helped and would like to have another course for possible flare with RT start later this week.  Energy is the same.  Reviewed all scans today with images.  No new pains.  Discussed later this week that JAGRUTI was ending screening/consent on 10/13.  He prefers to move forward with next Pluvicto dose as discussed and then see what clinical trial options are available rather than rush with screening.      ONCOLOGY HISTORY: Mr. 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 His oncologic history is detailed below.     1/6/21  PSA = 0.29  3/31/21 PSA = 0.44  7/7/21  PSA = 0.47  11/2021 PSA = 1.05  -- Start XTANDI  12/16/21 PSA =0.22  2/11/22 PSA= 0.50  3/22/22 PSA=0.72  5/5/2022 PSA=1.79  7/11/2022 PSA=2.16 --Start cycle 1 docetaxel   8/22/22 PSA = 1.36  9/12/22 PSA = 1.09  10/24/22 Cycle #6 of Docetaxel. End of treatment  1/9/23  PSA =0.66  3/20/23  PSA=1.54  4/21/23 PSA = 1.81  T=15  5/22/23 C1 Pluvicto   06/07/23          PSA = 1.42  7/18/23 PSA=1.75, C2 Pluvicto   8/28/23 Transfer of care initial visit for Tabby.  PSA 2.06.  C3 Pluvicto scheduled for 8/30.  Proceed with dose as planned.  MR L femur, ortho referral, PSMA PET ordered for prior to follow-up.    10/2/23 Palliative RT to L femur metastasis complete.  PSMA PET with mixed response.   "PSA 2.21.  RT to L femur  pending.  Continue with Dose #4 of Pluvicto despite mixed response to therapy.  Dex course for possible pain   Flare with RT.      ONCOLOGIC HISTORY:  1. 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 Naylor at [facility]. Prior PSA were 1.4 on 8/16/17, 2.4 on 6/28/16, 2.9 on 6/28/16, and as low as 0.4 on 3/26/2003.   - 1/08/2019: Consultation with Sarah Wilson CNP in Urology clinic. Repeat PSA 9.6.  - 1/16/2019: MRI prostate with contrast - \"This examination is characterized as PIRADS 5- very high probability. Clinically significant cancer is highly likely to be present. There is a large, invasive mass arising from the right peripheral zone and extending into the neurovascular bundle, seminal vesicle, and along the anterolateral right mesorectal fascia. Metastatic right external iliac lymph node. Metastatic lesion in the posterior/superior right acetabulum.\"  - 1/25/2019: CT abdomen and pelvis with IV contrast - \"1. Heterogeneous enhancing mass posteriorly in the upper pole of the right kidney measures 4.5 x 5.8 x 5.7 cm (AP by transverse by craniocaudal). It has a small nodular component extending posterior medially which abuts the right psoas muscle. This nodular extension measures 2.1 x 2.1 cm. Minimal stranding about the mass. No definite thrombus within the right renal vein. This renal mass is compatible with a renal cell carcinoma. A paraaortic lymph node situated immediately posterior to the left renal vein measures 1.1 cm in short axis, suspicious for metastasis. 2. A 2 cm right external iliac lymph node is also suspicious for metastases. 3. Multiple sclerotic osseous lesions suspicious for metastases. These include 0.8 and 0.6 cm sclerotic lesions laterally in the right iliac wing (images 53 and 62 " "respectively). Ill-defined groundglass density laterally in the right acetabulum measuring 1.7 x 1.2 cm corresponds with the lesion identified on MRI. A 0.4 cm groundglass density in the left acetabulum. Sclerotic lesion in the left femoral neck measures 0.9 x 1.6 cm (image 81). Sclerotic metastases would be more compatible with prostate metastases. 4. A 3 subcentimeter hepatic lesions are indeterminate. Metastases would be a consideration. These can be further characterized with liver MRI.\"  - 1/25/2019: NM bone scan - \"There is focal bony uptake in the left femoral neck, right acetabulum, right of midline at the S1 or L5 level of the spine, within multiple bilateral ribs, in the C7 or T1 level of the spine, and anteriorly within the skull. Findings are suspicious for metastases.\"  - 1/31/19: CT chest with contrast - \" No suspicious nodules in the chest.  Stable appearance of a 5.8 cm right renal mass concerning for renal carcinoma until proven otherwise.  Stable indeterminant subcentimeter hypodensities in the liver.  Multifocal osteoblastic metastasis including 2.5 cm lesion in the right fifth rib posteriorly and a 1.6 cm lesion in the right third rib posteriorly. No lytic lesions identified.\"  - 2/6/19: CT-guided right sclerotic fifth rib lesion biopsy - \"Metastatic carcinoma, consistent with prostate primary.  Immunohistochemical stains performed show the metastatic carcinoma stains positive for NKX3.1 (prostate marker), negative for STACIE 3 and PAX8, which supports the above diagnosis.\"  - 2/15/19: Started Casodex 50mg every day and consented for the biobanking protocol. 3/18/19 - stopped Casodex.  - 2/19/19: Case discussed in tumor board - recommendation for nephectomy for suspected malignant right renal mass.   - 2/26/19: Started Lupron 22.5mg every 3 months.   - 5/8/19: Started Docetaxel 75mg/m2 IV every 3 weeks. 06/18/19 - cycle 3, 7/10/19 - cycle 4, 07/31/19 - cycle 5, 08/21/19 - cycle 6.   - 10/2/19: " "Restaging CT CAP and NM Bone Scan showed improved osseous disease, no evidence of recurrent or new metastatic disease.  - 1/5/20: Restaging CT CAP and NM Bone Scan showed stable osseous disease, no evidence of recurrent or new metastatic disease.  - 4/6/20: NM bone scan with slightly improved uptake in known skeletal mets. CT C/A/P with contrast with stable osseous mets, no yusuf enlargement, no new visceral mets etc.  - 04/08/20: Zometa every 3 months.  - 10/5/20: CT C/A/P with contrast and NM bone scan - SD.   - 1/4/21: CT C/A/P with contrast and NM bone scan - SD but slight increase in right 5th rib tracer uptake and in posterior L5 sclerosis.   - 03/29/21: CT C/A/P with contrast - single new right sacral 8mm bone lesion (suspicious), other bone mets stable. No visceral/yusuf disease. Nephrectomy site without recurrence. NM bone scan - SD but \"increased uptake associated with the prior lesions of the lower  cervical spine, posterior right fourth rib and right L5 posterior arch elements. Otherwise unchanged uptake associated with the proximal left femur and left anterior fourth rib. Similar uptake of the left halux, possibly secondary to degenerative osteoarthritis or gout.\"      Radiation history:   C7         3,000 cGy       06 X      8/05/2021        8/18/2021        13       10  2 Sacrum          2,500 cGy       18 X      4/12/2022        4/18/2022         6               Sacrum retreat               700 cGy        18 X      2/28/2023        2/28/2023    L femur Pending for October 2023.     2. Stage III ([id number]), grade 3 of 4, clear-cell RCC of right kidney:  - Incidentally diagnosed as above.   - Underwent curative-intent robotic right radical nephrectomy with Dr. Wesley Cleveland on 3/19/19. No tumor spillage per op report.   - Path showed: \"Histologic Type: Clear cell renal cell carcinoma; Sarcomatoid Features: Not identified; Histologic Grade: Nucleolar grade 3 (WHO/ISUP). Extent Tumor Size: 4.3 cm. " "Microscopic Tumor Extension: Tumor extension into renal sinus (in vascular structures). Margins: Negative. Tumor Necrosis: Present; focal. Lymph-Vascular Invasion: Not identified.  Pathologic Staging (pTNM) Primary Tumor (pT): pT3a: Tumor extends into renal vein branches/renal sinus. Regional Lymph Nodes (pN): pNX. Number of Lymph Nodes Examined: 0 Distant Metastasis (pM): pM N/A.\"  - Restaging scans as above.    PAST MEDICAL HISTORY:  Past Medical History:   Diagnosis Date    Cancer of kidney, right (H)     Complication of anesthesia     slow wakeup     Malignant neoplasm of prostate metastatic to bone (H) 2/14/2019    Thyroid disease      CURRENT MEDICATIONS:   Current Outpatient Medications:     atorvastatin (LIPITOR) 20 MG tablet, Take 60 mg by mouth daily , Disp: , Rfl:     buprenorphine (BUTRANS) 20 MCG/HR WK patch, Place 1 patch onto the skin every 7 days, Disp: 4 patch, Rfl: 0    calcium citrate-vitamin D (CITRACAL) 315-250 MG-UNIT TABS per tablet, Take 650 mg by mouth 2 times daily , Disp: , Rfl:     cycloSPORINE (RESTASIS) 0.05 % ophthalmic emulsion, Place 1 drop into both eyes 2 times daily , Disp: , Rfl:     gabapentin (NEURONTIN) 300 MG capsule, TAKE ONE CAPSULE BY MOUTH TWICE DAILY AND 2 CAPSULES AT BEDTIME, Disp: 120 capsule, Rfl: 3    Glucosamine-Chondroit-Vit C-Mn (GLUCOSAMINE 1500 COMPLEX) CAPS, Take 1 capsule by mouth daily, Disp: , Rfl:     levothyroxine (SYNTHROID/LEVOTHROID) 112 MCG tablet, Take 112 mcg by mouth daily, Disp: , Rfl:     loratadine (CLARITIN) 10 MG tablet, , Disp: , Rfl:     Multiple Vitamins-Minerals (MULTIVITAMIN ADULT EXTRA C PO), Take 1 tablet by mouth every 24 hours, Disp: , Rfl:     naloxone (NARCAN) 4 MG/0.1ML nasal spray, Spray 1 spray (4 mg) into one nostril alternating nostrils as needed for opioid reversal every 2-3 minutes until assistance arrives, Disp: 0.2 mL, Rfl: 1    Nutritional Supplements (SALMON OIL) CAPS, Take 2 capsules by mouth daily , Disp: , Rfl:     " omeprazole (PRILOSEC) 20 MG DR capsule, Take 20 mg by mouth daily, Disp: , Rfl:     oxyCODONE (ROXICODONE) 5 MG tablet, Take 1-2 tablets (5-10 mg) by mouth every 3 hours as needed for severe pain, Disp: 90 tablet, Rfl: 0    prochlorperazine (COMPAZINE) 10 MG tablet, Take 1 tablet (10 mg) by mouth every 6 hours as needed for nausea or vomiting, Disp: 90 tablet, Rfl: 1    tamsulosin (FLOMAX) 0.4 MG capsule, Take 1 capsule (0.4 mg) by mouth daily, Disp: 30 capsule, Rfl: 3    triamterene-HCTZ (MAXZIDE-25) 37.5-25 MG tablet, Take 1 tablet by mouth daily, Disp: , Rfl:     Physical Exam:   There were no vitals taken for this visit.  General: Patient appears well in no acute distress.   Skin: No visualized rash or lesions on visualized skin  Eyes: EOMI, no erythema, sclera icterus or discharge noted  Resp: Appears to be breathing comfortably without accessory muscle usage, speaking in full sentences, no cough  MSK: Appears to have normal range of motion based on visualized movements  Neurologic: No apparent tremors, facial movements symmetric  Psych: affect bright, alert and oriented    ECOG PS 1.    LABORATORY DATA:  Most Recent 3 CBC's:  Recent Labs   Lab Test 09/25/23  1022 08/25/23  1219 07/18/23  0818   WBC 3.4* 6.1 3.9*   HGB 11.8* 11.8* 12.1*   MCV 89 90 89    220 220   ANEUTAUTO 1.9 4.4 2.2     Most Recent 3 BMP's:  Recent Labs   Lab Test 09/25/23  1022 08/28/23  1200 08/25/23  1219 07/18/23  0818    137 139 138   POTASSIUM 3.6 4.0 4.0 4.0   CHLORIDE 103 101 100 102   CO2 24 23 25 25   BUN 10.1 24.8* 17.0 18.4   CR 0.98 1.01 1.05 0.93   ANIONGAP 12 13 14 11   BETHANY 8.8 9.3 9.3 8.9   * 118* 106* 109*   PROTTOTAL 6.5 7.4  --  6.6   ALBUMIN 4.1 4.4  --  4.2    Most Recent 3 LFT's:  Recent Labs   Lab Test 09/25/23  1022 08/28/23  1200 07/18/23  0818   AST 27 24 28   ALT 17 15 22   ALKPHOS 170* 166* 114   BILITOTAL 0.3 0.3 0.2    Most Recent 2 TSH and T4:  Recent Labs   Lab Test 07/07/21  0834  "03/31/21  0824   TSH 1.67 1.63       I reviewed the above labs today.    PSA trend, see oncology history.     8/25/23 Xrays significant for enlarging L femur mass, no clear fracture.      ASSESSMENT & PLAN: Mr. Reyes is a delightful 63 year old gentleman with metastatic castration sensitive prostate adenocarcinoma as well as an incidentally diagnosed stage III clear-cell renal cell carcinoma of the right kidney (s/p radical R nephrectomy 3/19/19), who is here for a follow-up visit and initiation of docetafor now metastatic castration-resistant prostate cancer.     1. Metastatic castration-resistant prostate cancer, with involvement of the bones and RPLN:   - Patient met the criteria for CHAARTED \"high-volume\" metastatic hormone-sensitive prostate cancer. He opted for docetaxel in combination with ADT (per JOSEFA, [id number], KARLA). He was subsequently treated with docetaxel x6 doses in the [facility] setting and enzalutamide.  He initiated Pluvicto in May 2023 with an initial slight decline in PSA, but this began to rise just prior to Cycle 3.  Given his XR evidence of progression in L femur and PSA rise, we re-evaluated his progression with PSMA PET scan in September 2023, with note of mixed response.  Reconsented for  [employer] on 8/28/23.  Due for 3 month collection at end of November 2023.    -Proceed with pluvicto dose #4.    -Zometa next due 11/2023 along with Lupron.   -Palliative to manage further opiate refills.   -Dexamethasone burst for RT and one on hand if needed for pain flares.   Let Oncology team know if this is used.    -See back 3 weeks prior to next Pluvicto dose to discuss whether to continue with additional doses of Pluvicto vs consideration of clinical trial.  Consents sent for JAGRUTI and [id number].  Note JAGRUTI is closing to enrollment on 10/13.  We discussed this by phone and will proceed with Pluvicto as planned and then consider available trials.  JORGE ALBERTO-280 should also be " open in this window as well given lag of [id number] doses with NEERAJ period extended to 5 weeks.     2. Bone metastases:   - Noted to have osseous metastatic disease at the time of diagnosis. S/p radiation to C spine and sacrum (Re-irradiation to sacrum).   - worsening radiculopathy down starting in the low back and radiating down the left leg in June. MRI read was without new/acute abnormalities  - Pain continues to fluctuate but overall well controlled/managable   - pain mgmt per palliative care, on butrans patch now with addition of dilaudid.    - Encouraged to continue calcium and vitamin D supplementation; continue Zometa 4mg IV every ~3 months. Due next in November 2023.   -Evaluated by ortho, no pending fx.  RT to start in early October.       3. Stage 3, UISS-high risk ccRCC: s/p R nephrectomy   - approximately 40-50% risk of recurrence after definitive surgery.   - no clear evidence of residual disease at this time; the retroperitoneal lymphadenopathy is more consistent with metastatic prostate cancer   - Continue active surveillance with self-reporting for signs/symptoms of recurrence (this was previously explained in detail) and periodic H&P and scans.    PLAN:     Continue with next Pluvicto dose as scheduled.  Labs adequate and dose signed.   Return 3 weeks after next Pluvicto dose to assess symptoms and next steps in plan.   Dexamethasone burst as needed for pain control.      A total of 40 minutes were spent on this patient on the day of the encounter, of which more than 50% of this time was used for counseling and coordination of care.  The patient and his wife were given the opportunity to ask multiple questions today, all of which were answered to their satisfaction.     Omar Mcnair MD, PhD   of Medicine   Oncology/BMT/Cellular Therapies

## 2023-10-02 NOTE — NURSING NOTE
"Oncology Rooming Note    October 2, 2023 10:34 AM   Walter Reyes is a 63 year old male who presents for:    Chief Complaint   Patient presents with    Oncology Clinic Visit     Prostate CA     Initial Vitals: /73 (BP Location: Right arm, Patient Position: Sitting, Cuff Size: Adult Large)   Pulse 60   Temp 98.4  F (36.9  C) (Oral)   Resp 16   Wt 118.4 kg (261 lb)   SpO2 96%   BMI 36.40 kg/m   Estimated body mass index is 36.4 kg/m  as calculated from the following:    Height as of 9/20/23: 1.803 m (5' 11\").    Weight as of this encounter: 118.4 kg (261 lb). Body surface area is 2.44 meters squared.  Moderate Pain (4) Comment: Data Unavailable   No LMP for male patient.  Allergies reviewed: Yes  Medications reviewed: Yes    Medications: Medication refills not needed today.  Pharmacy name entered into EPIC:    [facility] [place], [place] - [address] [id number] - [place], [place] - [address] [facility] - [place], [place] - [address] [facility] [place], [place] - [address] [address]  [facility] [place], [place] - [address] [facility]    Clinical concerns: Patient states there are no new concerns to discuss with provider.     Shaji Bean              "

## 2023-10-04 ENCOUNTER — APPOINTMENT (OUTPATIENT)
Dept: RADIATION ONCOLOGY | Facility: CLINIC | Age: 63
End: 2023-10-04
Attending: RADIOLOGY
Payer: COMMERCIAL

## 2023-10-04 PROCEDURE — 77280 THER RAD SIMULAJ FIELD SMPL: CPT | Performed by: RADIOLOGY

## 2023-10-04 PROCEDURE — 77407 RADIATION TX DELIVERY LVL 2: CPT | Performed by: RADIOLOGY

## 2023-10-05 ENCOUNTER — APPOINTMENT (OUTPATIENT)
Dept: RADIATION ONCOLOGY | Facility: CLINIC | Age: 63
End: 2023-10-05
Attending: RADIOLOGY
Payer: COMMERCIAL

## 2023-10-05 PROCEDURE — 77280 THER RAD SIMULAJ FIELD SMPL: CPT | Mod: 26 | Performed by: RADIOLOGY

## 2023-10-05 PROCEDURE — 77407 RADIATION TX DELIVERY LVL 2: CPT | Performed by: RADIOLOGY

## 2023-10-05 PROCEDURE — 77014 PR CT GUIDE FOR PLACEMENT RADIATION THERAPY FIELDS: CPT | Mod: 26 | Performed by: RADIOLOGY

## 2023-10-06 ENCOUNTER — OFFICE VISIT (OUTPATIENT)
Dept: RADIATION ONCOLOGY | Facility: CLINIC | Age: 63
End: 2023-10-06
Attending: RADIOLOGY
Payer: COMMERCIAL

## 2023-10-06 DIAGNOSIS — C61 MALIGNANT NEOPLASM OF PROSTATE METASTATIC TO BONE (H): Primary | ICD-10-CM

## 2023-10-06 DIAGNOSIS — C79.51 MALIGNANT NEOPLASM OF PROSTATE METASTATIC TO BONE (H): Primary | ICD-10-CM

## 2023-10-06 PROCEDURE — 77014 PR CT GUIDE FOR PLACEMENT RADIATION THERAPY FIELDS: CPT | Mod: 26 | Performed by: RADIOLOGY

## 2023-10-06 PROCEDURE — 77407 RADIATION TX DELIVERY LVL 2: CPT | Performed by: RADIOLOGY

## 2023-10-06 PROCEDURE — 77387 GUIDANCE FOR RADJ TX DLVR: CPT | Performed by: RADIOLOGY

## 2023-10-06 NOTE — LETTER
10/6/2023         RE: Walter Reyes  [address] [facility] [address]        Dear Colleague,    Thank you for referring your patient, Walter Reyes, to the [facility] RADIATION ONCOLOGY. Please see a copy of my visit note below.    [facility] PHYSICIANS  SPECIALIZING IN BREAKTHROUGHS  Radiation Oncology    On Treatment Visit Note      Walter Reyes      Date: 10/6/2023   MRN: [id number]   : 1960  Diagnosis: Metastatic prostate cancer      Reason for Visit:  On Radiation Treatment Visit     Treatment Summary to Date  Treatment Site: L femur Current Dose: 1200/2000 cGy Fractions: 3/5      Chemotherapy  Chemo concurrent with radx?: No    ED Visit/Hospital Admission: ER last week due to uncontrolled pain    Treatment Breaks: none    Subjective:   Mr. Vincent is tolerating radiotherapy well. He developed acute pain in the left thigh in the past week, for which he presented to ER. He was started on Dilaudid and Decadron 4mg PO daily with good response. He is taking Gabapentin 300mg morning and afternoon and 600mg at night, oxycodone IR 5-10 q3h as needed and Butrans 20mcg/hr patch. Pain is adequately controlled on this regimen. He has no other complaints.    Nursing ROS:   Nursing ROS:   Nutrition Alteration  Diet Type: Patient's Preference           Cardiovascular  Respiratory effort: 1 - Normal - without distress                    Objective:   There were no vitals taken for this visit.  Gen: Appears well, in no acute distress  Skin: No erythema    Labs:  CBC RESULTS:   Recent Labs   Lab Test 23  1022   WBC 3.4*   RBC 3.97*   HGB 11.8*   HCT 35.5*   MCV 89   MCH 29.7   MCHC 33.2   RDW 13.4        ELECTROLYTES:  Recent Labs   Lab Test 23  1022      POTASSIUM 3.6   CHLORIDE 103   BETHANY 8.8   CO2 24   BUN 10.1   CR 0.98   *       Assessment:    Tolerating radiation therapy well.  All questions and concerns  addressed.    Toxicities:  Fatigue: Grade 0: No toxicity  Dermatitis: Grade 0: No toxicity    Plan:   Continue current therapy.    Asked patient to contact us if pain worsens over the next month  See Dr. Mcnair on 11-8-23   Follow up with Dr. Albert on 11-24 via video      Mosaiq chart and setup information reviewed  MVCT/IGRT images checked    Adriana Oh MD PGY-4  [phone] clinic  Pager [phone]    Please do not send letter to referring physician.       Physician Attestation   I, Lexis Albert, saw this patient and agree with the findings and plan of care as documented in the note.   I was present for key portions of the history and physical exam.    I personally reviewed the available treatment setup images and vital signs listed.     Lexis Albert MD MPH PhD    Department of Radiation Oncology      Again, thank you for allowing me to participate in the care of your patient.        Sincerely,        Lexis Albert

## 2023-10-06 NOTE — PROGRESS NOTES
[facility] PHYSICIANS  SPECIALIZING IN BREAKTHROUGHS  Radiation Oncology    On Treatment Visit Note      Walter Reyes      Date: 10/6/2023   MRN: [id number]   : 1960  Diagnosis: Metastatic prostate cancer      Reason for Visit:  On Radiation Treatment Visit     Treatment Summary to Date  Treatment Site: L femur Current Dose: 1200/2000 cGy Fractions: 3/5      Chemotherapy  Chemo concurrent with radx?: No    ED Visit/Hospital Admission: ER last week due to uncontrolled pain    Treatment Breaks: none    Subjective:   Mr. Vincent is tolerating radiotherapy well. He developed acute pain in the left thigh in the past week, for which he presented to ER. He was started on Dilaudid and Decadron 4mg PO daily with good response. He is taking Gabapentin 300mg morning and afternoon and 600mg at night, oxycodone IR 5-10 q3h as needed and Butrans 20mcg/hr patch. Pain is adequately controlled on this regimen. He has no other complaints.    Nursing ROS:   Nursing ROS:   Nutrition Alteration  Diet Type: Patient's Preference           Cardiovascular  Respiratory effort: 1 - Normal - without distress                    Objective:   There were no vitals taken for this visit.  Gen: Appears well, in no acute distress  Skin: No erythema    Labs:  CBC RESULTS:   Recent Labs   Lab Test 23  1022   WBC 3.4*   RBC 3.97*   HGB 11.8*   HCT 35.5*   MCV 89   MCH 29.7   MCHC 33.2   RDW 13.4        ELECTROLYTES:  Recent Labs   Lab Test 23  1022      POTASSIUM 3.6   CHLORIDE 103   BETHANY 8.8   CO2 24   BUN 10.1   CR 0.98   *       Assessment:    Tolerating radiation therapy well.  All questions and concerns addressed.    Toxicities:  Fatigue: Grade 0: No toxicity  Dermatitis: Grade 0: No toxicity    Plan:   Continue current therapy.    Asked patient to contact us if pain worsens over the next month  See Dr. Mcnair on 23   Follow up with Dr. Albert on  via video      [employer] chart  and setup information reviewed  MVCT/IGRT images checked    Adriana Oh MD PGY-4  [phone] clinic  Pager [phone]    Please do not send letter to referring physician.       Physician Attestation   I, Lexis Albert, saw this patient and agree with the findings and plan of care as documented in the note.   I was present for key portions of the history and physical exam.    I personally reviewed the available treatment setup images and vital signs listed.     Lexis Albert MD MPH PhD    Department of Radiation Oncology

## 2023-10-09 ENCOUNTER — APPOINTMENT (OUTPATIENT)
Dept: RADIATION ONCOLOGY | Facility: CLINIC | Age: 63
End: 2023-10-09
Attending: RADIOLOGY
Payer: COMMERCIAL

## 2023-10-09 PROCEDURE — 77014 PR CT GUIDE FOR PLACEMENT RADIATION THERAPY FIELDS: CPT | Mod: 26 | Performed by: RADIOLOGY

## 2023-10-09 PROCEDURE — 77407 RADIATION TX DELIVERY LVL 2: CPT | Performed by: RADIOLOGY

## 2023-10-09 PROCEDURE — 77387 GUIDANCE FOR RADJ TX DLVR: CPT | Performed by: RADIOLOGY

## 2023-10-10 ENCOUNTER — LAB (OUTPATIENT)
Dept: INFUSION THERAPY | Facility: CLINIC | Age: 63
End: 2023-10-10
Attending: INTERNAL MEDICINE
Payer: COMMERCIAL

## 2023-10-10 ENCOUNTER — DOCUMENTATION ONLY (OUTPATIENT)
Dept: ONCOLOGY | Facility: CLINIC | Age: 63
End: 2023-10-10

## 2023-10-10 ENCOUNTER — APPOINTMENT (OUTPATIENT)
Dept: RADIATION ONCOLOGY | Facility: CLINIC | Age: 63
End: 2023-10-10
Attending: RADIOLOGY
Payer: COMMERCIAL

## 2023-10-10 DIAGNOSIS — C64.1 RENAL CELL CARCINOMA, RIGHT (H): Primary | ICD-10-CM

## 2023-10-10 DIAGNOSIS — C61 MALIGNANT NEOPLASM OF PROSTATE METASTATIC TO BONE (H): ICD-10-CM

## 2023-10-10 DIAGNOSIS — C79.51 MALIGNANT NEOPLASM OF PROSTATE METASTATIC TO BONE (H): ICD-10-CM

## 2023-10-10 LAB
ALBUMIN SERPL BCG-MCNC: 3.9 G/DL (ref 3.5–5.2)
ALP SERPL-CCNC: 218 U/L (ref 40–129)
ALT SERPL W P-5'-P-CCNC: 23 U/L (ref 0–70)
ANION GAP SERPL CALCULATED.3IONS-SCNC: 12 MMOL/L (ref 7–15)
AST SERPL W P-5'-P-CCNC: 19 U/L (ref 0–45)
BASO+EOS+MONOS # BLD AUTO: ABNORMAL 10*3/UL
BASO+EOS+MONOS NFR BLD AUTO: ABNORMAL %
BASOPHILS # BLD AUTO: 0 10E3/UL (ref 0–0.2)
BASOPHILS NFR BLD AUTO: 1 %
BILIRUB SERPL-MCNC: 0.3 MG/DL
BUN SERPL-MCNC: 23.1 MG/DL (ref 8–23)
CALCIUM SERPL-MCNC: 9 MG/DL (ref 8.8–10.2)
CHLORIDE SERPL-SCNC: 103 MMOL/L (ref 98–107)
CREAT SERPL-MCNC: 0.99 MG/DL (ref 0.67–1.17)
DEPRECATED HCO3 PLAS-SCNC: 24 MMOL/L (ref 22–29)
EGFRCR SERPLBLD CKD-EPI 2021: 87 ML/MIN/1.73M2
EOSINOPHIL # BLD AUTO: 0.2 10E3/UL (ref 0–0.7)
EOSINOPHIL NFR BLD AUTO: 2 %
ERYTHROCYTE [DISTWIDTH] IN BLOOD BY AUTOMATED COUNT: 13.7 % (ref 10–15)
GLUCOSE SERPL-MCNC: 118 MG/DL (ref 70–99)
HCT VFR BLD AUTO: 37.4 % (ref 40–53)
HGB BLD-MCNC: 12.2 G/DL (ref 13.3–17.7)
IMM GRANULOCYTES # BLD: 0.2 10E3/UL
IMM GRANULOCYTES NFR BLD: 2 %
LYMPHOCYTES # BLD AUTO: 1.2 10E3/UL (ref 0.8–5.3)
LYMPHOCYTES NFR BLD AUTO: 17 %
MCH RBC QN AUTO: 29.7 PG (ref 26.5–33)
MCHC RBC AUTO-ENTMCNC: 32.6 G/DL (ref 31.5–36.5)
MCV RBC AUTO: 91 FL (ref 78–100)
MONOCYTES # BLD AUTO: 0.8 10E3/UL (ref 0–1.3)
MONOCYTES NFR BLD AUTO: 10 %
NEUTROPHILS # BLD AUTO: 5 10E3/UL (ref 1.6–8.3)
NEUTROPHILS NFR BLD AUTO: 68 %
NRBC # BLD AUTO: 0 10E3/UL
NRBC BLD AUTO-RTO: 0 /100
PLATELET # BLD AUTO: 249 10E3/UL (ref 150–450)
POTASSIUM SERPL-SCNC: 3.6 MMOL/L (ref 3.4–5.3)
PROT SERPL-MCNC: 6.5 G/DL (ref 6.4–8.3)
PSA SERPL DL<=0.01 NG/ML-MCNC: 2.09 NG/ML (ref 0–4.5)
RBC # BLD AUTO: 4.11 10E6/UL (ref 4.4–5.9)
SODIUM SERPL-SCNC: 139 MMOL/L (ref 135–145)
WBC # BLD AUTO: 7.3 10E3/UL (ref 4–11)

## 2023-10-10 PROCEDURE — 77427 RADIATION TX MANAGEMENT X5: CPT | Mod: GC | Performed by: RADIOLOGY

## 2023-10-10 PROCEDURE — 36593 DECLOT VASCULAR DEVICE: CPT

## 2023-10-10 PROCEDURE — 77014 PR CT GUIDE FOR PLACEMENT RADIATION THERAPY FIELDS: CPT | Mod: 26 | Performed by: RADIOLOGY

## 2023-10-10 PROCEDURE — 77336 RADIATION PHYSICS CONSULT: CPT | Performed by: RADIOLOGY

## 2023-10-10 PROCEDURE — 84153 ASSAY OF PSA TOTAL: CPT | Performed by: INTERNAL MEDICINE

## 2023-10-10 PROCEDURE — 84403 ASSAY OF TOTAL TESTOSTERONE: CPT | Performed by: INTERNAL MEDICINE

## 2023-10-10 PROCEDURE — 85025 COMPLETE CBC W/AUTO DIFF WBC: CPT | Performed by: INTERNAL MEDICINE

## 2023-10-10 PROCEDURE — 77387 GUIDANCE FOR RADJ TX DLVR: CPT | Performed by: RADIOLOGY

## 2023-10-10 PROCEDURE — 36415 COLL VENOUS BLD VENIPUNCTURE: CPT

## 2023-10-10 PROCEDURE — 80053 COMPREHEN METABOLIC PANEL: CPT | Performed by: INTERNAL MEDICINE

## 2023-10-10 PROCEDURE — 250N000011 HC RX IP 250 OP 636: Mod: JZ | Performed by: INTERNAL MEDICINE

## 2023-10-10 PROCEDURE — 77407 RADIATION TX DELIVERY LVL 2: CPT | Performed by: RADIOLOGY

## 2023-10-10 RX ORDER — HEPARIN SODIUM (PORCINE) LOCK FLUSH IV SOLN 100 UNIT/ML 100 UNIT/ML
5 SOLUTION INTRAVENOUS
Status: DISCONTINUED | OUTPATIENT
Start: 2023-10-10 | End: 2023-10-10 | Stop reason: HOSPADM

## 2023-10-10 RX ORDER — HEPARIN SODIUM (PORCINE) LOCK FLUSH IV SOLN 100 UNIT/ML 100 UNIT/ML
5 SOLUTION INTRAVENOUS
OUTPATIENT
Start: 2023-10-10

## 2023-10-10 RX ORDER — HEPARIN SODIUM,PORCINE 10 UNIT/ML
5 VIAL (ML) INTRAVENOUS
OUTPATIENT
Start: 2023-10-10

## 2023-10-10 RX ADMIN — ALTEPLASE 2 MG: 2.2 INJECTION, POWDER, LYOPHILIZED, FOR SOLUTION INTRAVENOUS at 13:11

## 2023-10-10 RX ADMIN — Medication 5 ML: at 13:37

## 2023-10-10 NOTE — NURSING NOTE
Called patient to let him know that we are still looking in his medical hx to assure that he is preliminarily eligible for the [facility] T cell trial, specifically with is hx of RCC. Patient agreeable.     Anisha Varghese RN   Clinical Research Coordinator Nurse-Solid Tumor   Phone: [phone] Pgr: [phone]

## 2023-10-10 NOTE — PROGRESS NOTES
Nursing Note:  Walter Reyes presents today for labs.    Patient seen by provider today: No   present during visit today: Not Applicable.    Note: N/A.    Intravenous Access:  Labs drawn without difficulty after 20 min cathflo dwell time  Implanted Port.    Discharge Plan:   Patient was discharged to home     Cyndy Alexis RN

## 2023-10-12 ENCOUNTER — ONCOLOGY VISIT (OUTPATIENT)
Dept: RADIATION ONCOLOGY | Facility: CLINIC | Age: 63
End: 2023-10-12

## 2023-10-12 NOTE — Clinical Note
10/12/2023         RE: Walter Reyes  [address] [facility] [address]        Dear Colleague,    Thank you for referring your patient, Walter Reyes, to the [facility] RADIATION ONCOLOGY. Please see a copy of my visit note below.    No notes on file    Again, thank you for allowing me to participate in the care of your patient.        Sincerely,        Lexis Albert

## 2023-10-13 LAB — TESTOST SERPL-MCNC: <2 NG/DL (ref 240–950)

## 2023-10-17 ENCOUNTER — HOSPITAL ENCOUNTER (OUTPATIENT)
Dept: NUCLEAR MEDICINE | Facility: CLINIC | Age: 63
Setting detail: NUCLEAR MEDICINE
Discharge: HOME | End: 2023-10-17
Attending: INTERNAL MEDICINE | Admitting: INTERNAL MEDICINE
Payer: COMMERCIAL

## 2023-10-17 VITALS
SYSTOLIC BLOOD PRESSURE: 117 MMHG | HEART RATE: 61 BPM | RESPIRATION RATE: 16 BRPM | DIASTOLIC BLOOD PRESSURE: 72 MMHG | TEMPERATURE: 97.7 F | OXYGEN SATURATION: 96 %

## 2023-10-17 DIAGNOSIS — C61 MALIGNANT NEOPLASM OF PROSTATE METASTATIC TO BONE (H): Primary | ICD-10-CM

## 2023-10-17 DIAGNOSIS — C79.51 MALIGNANT NEOPLASM OF PROSTATE METASTATIC TO BONE (H): Primary | ICD-10-CM

## 2023-10-17 PROCEDURE — 79101 NUCLEAR RX IV ADMIN: CPT | Mod: 26 | Performed by: RADIOLOGY

## 2023-10-17 PROCEDURE — A9607 HC RX 344: HCPCS | Mod: JZ | Performed by: INTERNAL MEDICINE

## 2023-10-17 PROCEDURE — 79101 NUCLEAR RX IV ADMIN: CPT

## 2023-10-17 PROCEDURE — 344N000001 HC RX 344: Mod: JZ | Performed by: INTERNAL MEDICINE

## 2023-10-17 RX ORDER — DIPHENHYDRAMINE HYDROCHLORIDE 50 MG/ML
50 INJECTION INTRAMUSCULAR; INTRAVENOUS
Status: DISCONTINUED | OUTPATIENT
Start: 2023-10-17 | End: 2023-10-18 | Stop reason: HOSPADM

## 2023-10-17 RX ORDER — LORAZEPAM 2 MG/ML
.5-1 INJECTION INTRAMUSCULAR EVERY 6 HOURS PRN
Status: DISCONTINUED | OUTPATIENT
Start: 2023-10-17 | End: 2023-10-18 | Stop reason: HOSPADM

## 2023-10-17 RX ORDER — ONDANSETRON 8 MG/1
8 TABLET, FILM COATED ORAL
Status: DISCONTINUED | OUTPATIENT
Start: 2023-10-17 | End: 2023-10-18 | Stop reason: HOSPADM

## 2023-10-17 RX ORDER — ALBUTEROL SULFATE 0.83 MG/ML
2.5 SOLUTION RESPIRATORY (INHALATION)
Status: DISCONTINUED | OUTPATIENT
Start: 2023-10-17 | End: 2023-10-18 | Stop reason: HOSPADM

## 2023-10-17 RX ORDER — LORAZEPAM 0.5 MG/1
.5-1 TABLET ORAL EVERY 6 HOURS PRN
Status: DISCONTINUED | OUTPATIENT
Start: 2023-10-17 | End: 2023-10-18 | Stop reason: HOSPADM

## 2023-10-17 RX ORDER — ALBUTEROL SULFATE 90 UG/1
1-2 AEROSOL, METERED RESPIRATORY (INHALATION)
Status: DISCONTINUED | OUTPATIENT
Start: 2023-10-17 | End: 2023-10-18 | Stop reason: HOSPADM

## 2023-10-17 RX ORDER — METHYLPREDNISOLONE SODIUM SUCCINATE 125 MG/2ML
125 INJECTION, POWDER, LYOPHILIZED, FOR SOLUTION INTRAMUSCULAR; INTRAVENOUS
Status: DISCONTINUED | OUTPATIENT
Start: 2023-10-17 | End: 2023-10-18 | Stop reason: HOSPADM

## 2023-10-17 RX ORDER — EPINEPHRINE 1 MG/ML
0.3 INJECTION, SOLUTION, CONCENTRATE INTRAVENOUS EVERY 5 MIN PRN
Status: DISCONTINUED | OUTPATIENT
Start: 2023-10-17 | End: 2023-10-18 | Stop reason: HOSPADM

## 2023-10-17 RX ORDER — MEPERIDINE HYDROCHLORIDE 25 MG/ML
25 INJECTION INTRAMUSCULAR; INTRAVENOUS; SUBCUTANEOUS EVERY 30 MIN PRN
Status: DISCONTINUED | OUTPATIENT
Start: 2023-10-17 | End: 2023-10-18 | Stop reason: HOSPADM

## 2023-10-17 RX ORDER — PROCHLORPERAZINE MALEATE 10 MG
10 TABLET ORAL EVERY 6 HOURS PRN
Status: DISCONTINUED | OUTPATIENT
Start: 2023-10-17 | End: 2023-10-18 | Stop reason: HOSPADM

## 2023-10-17 RX ADMIN — LUTETIUM LU 177 VIPIVOTIDE TETRAXETAN 200 MILLICURIE: 27 INJECTION, SOLUTION INTRAVENOUS at 09:58

## 2023-10-17 NOTE — PROGRESS NOTES
[facility]ostics Nursing Note:    Patient presents today for Pluvicto therapy, dose 4 of  6  Patient seen by provider today: No   present during visit today: NOT APPLICABLE      Intravenous Access:  Peripheral IV placed     Treatment Conditions:  Labs done on  10/10/23    Results reviewed, labs Met treatment parameters, ok to proceed with treatment.           Post Infusion Assessment:  Patient tolerated infusion without incident.    PIV - No evidence of extravasations, access discontinued per protocol.         Discharge Plan:   Patient will return as scheduled for next appointment.   Patient discharged at 1035am  in stable condition accompanied by: Family  Departure Mode: Ambulatory

## 2023-10-18 ENCOUNTER — NURSE TRIAGE (OUTPATIENT)
Dept: ONCOLOGY | Facility: CLINIC | Age: 63
End: 2023-10-18

## 2023-10-18 DIAGNOSIS — K57.92 DIVERTICULITIS: Primary | ICD-10-CM

## 2023-10-18 RX ORDER — CIPROFLOXACIN 500 MG/1
500 TABLET, FILM COATED ORAL 2 TIMES DAILY
Qty: 24 TABLET | Refills: 0 | Status: SHIPPED | OUTPATIENT
Start: 2023-10-18 | End: 2023-11-08

## 2023-10-18 RX ORDER — METRONIDAZOLE 500 MG/1
500 TABLET ORAL 3 TIMES DAILY
Qty: 36 TABLET | Refills: 0 | Status: SHIPPED | OUTPATIENT
Start: 2023-10-18 | End: 2023-11-08

## 2023-10-18 NOTE — TELEPHONE ENCOUNTER
Nurse Triage SBAR    Situation: Abd pain    Background:    DX: Prostate Cancer  Provider:   Most recent appointment: 10/02/23 w/  Upcoming appointments: 11/08/23 w/  Most recent treatment: 10/17/23 Pluvicto      Assessment:   Left lower abdominal pain, almost down by pelvis. Same pain he has had the last 3x he has had diverticulitis before. Pain started on Friday. Rates 4/10 and describes as dull (constant) and sharp (intermittent) pain.     Current pain management: Butrans patch and oxycodone PRN.    Denies F/C, problems with bowel or bladder, N/V, SOB    Pt wondering if  would be willing to prescribe abx so he does not have to go in to the ED. Pt states he is hesitant to go to ED since he just got Pluvicto tx yesterday.    Recommendation:   [id number] Paged   [phone]  responding and stating he will send in abx for pt. If anything worsens or deviates from what he has experienced in the past with diverticulitis then he should go to ED.  [phone] Contacted pt to inform him of 's response/recommendation. Pt verbalized understanding and grateful for help.

## 2023-10-18 NOTE — PROGRESS NOTES
Called regarding new onset abdominal pain similar to prior episodes of diverticulitis.  He just received a dose of Pluvicto on 10/17 and is in the isolation period.      Rx sent for cipro/flagyl x12 days as given prior.      Discussed with triage that should anything about his symptoms worsen, change or deviate from his typical diverticulitis course, then he should seek evaluation even if in the isolation period.

## 2023-10-18 NOTE — PROCEDURES
Radiotherapy Treatment Summary          Date of Report: 2023     PATIENT: AZRA REYES  MEDICAL RECORD NO: [id number]  : 1960     DIAGNOSIS: C79.51 Secondary malignant neoplasm of bone  INTENT OF RADIOTHERAPY: palliative  PATHOLOGY: prostate carcinoma  STAGE: IV  CONCURRENT SYSTEMIC THERAPY: Pluvicto     Details of the treatments summarized below are found in records kept in the Department of Radiation Oncology at [facility].     Treatment Summary:  Treatment Site  Dose  Modality From  To  Elapsed Days Fx.  Left femur    2,000 cGy 18 X 10/04/2023 10/10/2023   6   5         Dose per Fraction:  400 cGy     COMMENTS:   Mr. Reyes is a 64yo man with metastatic castrate-resistant prostate cancer which presented initially   with elevated screening PSA 9.1 (). Further evaluation showed osteoblastic lesions in the right 3rd and   5th ribs, lymphadenopathy in the paraaortic and external iliac chains, and sclerotic lesions in the right iliac bone,   right acetabulum, left acetabulum and left femoral neck. A 5th rib lesion was confirmed to be prostate metastasis   on biopsy. Casodex was started (), followed by Lupron and Docetaxel. Imaging showed good response to   treatment with no evidence of disease. He did well until  when bone scan showed an increase in 5th rib   lesion and L5 lesion. He was thus started on Xtandi. Docetaxel was added () for six cycles (through 10-  2022). Pluvicto was started (). He has received multiple palliative radiotherapy courses including C7   spine (), sacrum (), repeat sacrum () and has recently been referred back for consideration   of palliative radiotherapy to the left femur for local control.     The left femur received 20Gy in 5 once daily fractions with 18MV photons using AP/PA beam arrangement   with 3 coplanar beams. During treatment, he had a pain flare for which he presented to ER. Dilaudid and    Decadron 4mg PO daily were given, with good response. Additionally, he used Gabapentin 300mg PO morning   and noon and 600mg PO nightly, Butrans patch and Oxycodone 5-10mg q3h as needed for pain relief.      ED visits/hospitalizations: outside ER visit during the week of 10-4 for pain flare  Missed treatments: none  Acute Toxicity Profile by CTC v5.0: pain, grade 2     PAIN MANAGEMENT: Butrans patch, Oxycodone, Gabapentin     FOLLOW UP PLAN:   See Dr. Mcnair 11-8-23  See Dr. Albert 11-24-23     Resident Physician: Adriana Oh M.D.   Staff Physician: Lexis Albert M.D.  CC: Omar Mcnair MD                                  Radiation Oncology:  [facility] [address], [address] [place] [phone]

## 2023-10-25 ENCOUNTER — TELEPHONE (OUTPATIENT)
Dept: ONCOLOGY | Facility: CLINIC | Age: 63
End: 2023-10-25

## 2023-10-30 NOTE — TELEPHONE ENCOUNTER
Application for Disability Parking Certificate form filled out and put in providers folder for review and signature.      Loraine Mcfarland    
Application for Disability Parking Certificate forms received via [employer] from patient.      Forms to be completed and put in folder for provider to approve.    Fax #:    Claim:     Loraine Mcfarland    
Application for Disability Parking Certificate paperwork completed, checked for accuracy, and signed. A copy was made, sent to scanning and original mailed to patient at home address.    Danay Mcfarland,  
[phone]

## 2023-10-31 DIAGNOSIS — M54.10 RADICULOPATHY, UNSPECIFIED SPINAL REGION: ICD-10-CM

## 2023-10-31 DIAGNOSIS — C61 PROSTATE CANCER (H): ICD-10-CM

## 2023-10-31 RX ORDER — BUPRENORPHINE 20 UG/H
1 PATCH TRANSDERMAL
Qty: 4 PATCH | Refills: 0 | Status: SHIPPED | OUTPATIENT
Start: 2023-10-31 | End: 2023-11-15 | Stop reason: ALTCHOICE

## 2023-10-31 NOTE — TELEPHONE ENCOUNTER
Received [employer] message from patient requesting refill of butrans patch.     Last refill: 10/2/23  Last office visit: 9/20/23  Scheduled for follow up 11/21/23     Will route request to NP for review.     Reviewed [place]  Report.

## 2023-11-01 ENCOUNTER — INFUSION THERAPY VISIT (OUTPATIENT)
Dept: INFUSION THERAPY | Facility: CLINIC | Age: 63
End: 2023-11-01
Attending: STUDENT IN AN ORGANIZED HEALTH CARE EDUCATION/TRAINING PROGRAM
Payer: COMMERCIAL

## 2023-11-01 VITALS — SYSTOLIC BLOOD PRESSURE: 121 MMHG | OXYGEN SATURATION: 100 % | HEART RATE: 87 BPM | DIASTOLIC BLOOD PRESSURE: 70 MMHG

## 2023-11-01 DIAGNOSIS — C79.51 MALIGNANT NEOPLASM OF PROSTATE METASTATIC TO BONE (H): Primary | ICD-10-CM

## 2023-11-01 DIAGNOSIS — C61 MALIGNANT NEOPLASM OF PROSTATE METASTATIC TO BONE (H): Primary | ICD-10-CM

## 2023-11-01 PROCEDURE — 96402 CHEMO HORMON ANTINEOPL SQ/IM: CPT

## 2023-11-01 PROCEDURE — 250N000011 HC RX IP 250 OP 636: Mod: JZ | Performed by: INTERNAL MEDICINE

## 2023-11-01 PROCEDURE — 250N000011 HC RX IP 250 OP 636: Mod: JZ | Performed by: NURSE PRACTITIONER

## 2023-11-01 PROCEDURE — 96374 THER/PROPH/DIAG INJ IV PUSH: CPT

## 2023-11-01 RX ORDER — HEPARIN SODIUM (PORCINE) LOCK FLUSH IV SOLN 100 UNIT/ML 100 UNIT/ML
5 SOLUTION INTRAVENOUS
Status: DISCONTINUED | OUTPATIENT
Start: 2023-11-01 | End: 2023-11-01 | Stop reason: HOSPADM

## 2023-11-01 RX ORDER — ZOLEDRONIC ACID 0.04 MG/ML
4 INJECTION, SOLUTION INTRAVENOUS ONCE
Status: CANCELLED | OUTPATIENT
Start: 2023-11-05 | End: 2023-11-05

## 2023-11-01 RX ORDER — HEPARIN SODIUM,PORCINE 10 UNIT/ML
5 VIAL (ML) INTRAVENOUS
Status: CANCELLED | OUTPATIENT
Start: 2023-11-05

## 2023-11-01 RX ORDER — HEPARIN SODIUM (PORCINE) LOCK FLUSH IV SOLN 100 UNIT/ML 100 UNIT/ML
5 SOLUTION INTRAVENOUS
Status: CANCELLED | OUTPATIENT
Start: 2023-11-05

## 2023-11-01 RX ORDER — ZOLEDRONIC ACID 0.04 MG/ML
4 INJECTION, SOLUTION INTRAVENOUS ONCE
Status: COMPLETED | OUTPATIENT
Start: 2023-11-01 | End: 2023-11-01

## 2023-11-01 RX ADMIN — ZOLEDRONIC ACID 4 MG: 0.04 INJECTION, SOLUTION INTRAVENOUS at 09:30

## 2023-11-01 RX ADMIN — Medication 5 ML: at 09:45

## 2023-11-01 RX ADMIN — LEUPROLIDE ACETATE 22.5 MG: KIT at 09:49

## 2023-11-01 NOTE — PROGRESS NOTES
Infusion Nursing Note:  Walter DELORIS Reyes presents today for Lupron/Zometa.    Patient seen by provider today: No   present during visit today: Not Applicable.    Note: Pt verifies he is taking Ca/Vit D as prescribed. No upcoming invasive dental procedures.      Intravenous Access:  Implanted Port.    Treatment Conditions: Labs noted from 10/10/23  Lab Results   Component Value Date     10/10/2023    POTASSIUM 3.6 10/10/2023    MAG 1.9 08/25/2023    CR 0.99 10/10/2023    BETHANY 9.0 10/10/2023    BILITOTAL 0.3 10/10/2023    ALBUMIN 3.9 10/10/2023    ALT 23 10/10/2023    AST 19 10/10/2023       Results reviewed, labs MET treatment parameters, ok to proceed with treatment.      Post Infusion Assessment:  Patient tolerated infusion without incident.  Patient tolerated injection without incident.  Blood return noted pre and post infusion.  Site patent and intact, free from redness, edema or discomfort.  No evidence of extravasations.  Access discontinued per protocol.       Discharge Plan:   [facility]  Patient will return 11/8/23 to [facility] for labs/RC for next appointment.   Patient discharged in stable condition accompanied by: self.  Departure Mode: Ambulatory.      Cyndy Alexis RN

## 2023-11-08 ENCOUNTER — PATIENT OUTREACH (OUTPATIENT)
Dept: ONCOLOGY | Facility: CLINIC | Age: 63
End: 2023-11-08

## 2023-11-08 ENCOUNTER — LAB (OUTPATIENT)
Dept: LAB | Facility: CLINIC | Age: 63
End: 2023-11-08
Attending: STUDENT IN AN ORGANIZED HEALTH CARE EDUCATION/TRAINING PROGRAM
Payer: COMMERCIAL

## 2023-11-08 ENCOUNTER — ONCOLOGY VISIT (OUTPATIENT)
Dept: ONCOLOGY | Facility: CLINIC | Age: 63
End: 2023-11-08
Attending: STUDENT IN AN ORGANIZED HEALTH CARE EDUCATION/TRAINING PROGRAM
Payer: COMMERCIAL

## 2023-11-08 VITALS
DIASTOLIC BLOOD PRESSURE: 72 MMHG | BODY MASS INDEX: 37.11 KG/M2 | WEIGHT: 266.1 LBS | OXYGEN SATURATION: 100 % | HEART RATE: 58 BPM | SYSTOLIC BLOOD PRESSURE: 138 MMHG | RESPIRATION RATE: 16 BRPM | TEMPERATURE: 98.2 F

## 2023-11-08 DIAGNOSIS — C79.51 MALIGNANT NEOPLASM OF PROSTATE METASTATIC TO BONE (H): ICD-10-CM

## 2023-11-08 DIAGNOSIS — D63.0 ANEMIA IN NEOPLASTIC DISEASE: Primary | ICD-10-CM

## 2023-11-08 DIAGNOSIS — C61 MALIGNANT NEOPLASM OF PROSTATE METASTATIC TO BONE (H): ICD-10-CM

## 2023-11-08 LAB
ALBUMIN SERPL BCG-MCNC: 4 G/DL (ref 3.5–5.2)
ALP SERPL-CCNC: 177 U/L (ref 40–129)
ALT SERPL W P-5'-P-CCNC: 16 U/L (ref 0–70)
ANION GAP SERPL CALCULATED.3IONS-SCNC: 10 MMOL/L (ref 7–15)
AST SERPL W P-5'-P-CCNC: 31 U/L (ref 0–45)
BASOPHILS # BLD AUTO: 0 10E3/UL (ref 0–0.2)
BASOPHILS NFR BLD AUTO: 0 %
BILIRUB SERPL-MCNC: 0.2 MG/DL
BUN SERPL-MCNC: 16 MG/DL (ref 8–23)
CALCIUM SERPL-MCNC: 9.2 MG/DL (ref 8.8–10.2)
CHLORIDE SERPL-SCNC: 107 MMOL/L (ref 98–107)
CREAT SERPL-MCNC: 0.86 MG/DL (ref 0.67–1.17)
DEPRECATED HCO3 PLAS-SCNC: 22 MMOL/L (ref 22–29)
EGFRCR SERPLBLD CKD-EPI 2021: >90 ML/MIN/1.73M2
EOSINOPHIL # BLD AUTO: 0 10E3/UL (ref 0–0.7)
EOSINOPHIL NFR BLD AUTO: 0 %
ERYTHROCYTE [DISTWIDTH] IN BLOOD BY AUTOMATED COUNT: 14.1 % (ref 10–15)
FERRITIN SERPL-MCNC: 57 NG/ML (ref 31–409)
GLUCOSE SERPL-MCNC: 118 MG/DL (ref 70–99)
HCT VFR BLD AUTO: 33.4 % (ref 40–53)
HGB BLD-MCNC: 10.9 G/DL (ref 13.3–17.7)
IMM GRANULOCYTES # BLD: 0 10E3/UL
IMM GRANULOCYTES NFR BLD: 1 %
IRON BINDING CAPACITY (ROCHE): 297 UG/DL (ref 240–430)
IRON SATN MFR SERPL: 16 % (ref 15–46)
IRON SERPL-MCNC: 47 UG/DL (ref 61–157)
LYMPHOCYTES # BLD AUTO: 0.7 10E3/UL (ref 0.8–5.3)
LYMPHOCYTES NFR BLD AUTO: 9 %
MCH RBC QN AUTO: 28.8 PG (ref 26.5–33)
MCHC RBC AUTO-ENTMCNC: 32.6 G/DL (ref 31.5–36.5)
MCV RBC AUTO: 88 FL (ref 78–100)
MONOCYTES # BLD AUTO: 0.5 10E3/UL (ref 0–1.3)
MONOCYTES NFR BLD AUTO: 7 %
NEUTROPHILS # BLD AUTO: 6.4 10E3/UL (ref 1.6–8.3)
NEUTROPHILS NFR BLD AUTO: 83 %
NRBC # BLD AUTO: 0 10E3/UL
NRBC BLD AUTO-RTO: 0 /100
PLATELET # BLD AUTO: 226 10E3/UL (ref 150–450)
POTASSIUM SERPL-SCNC: 4.1 MMOL/L (ref 3.4–5.3)
PROT SERPL-MCNC: 7.1 G/DL (ref 6.4–8.3)
PSA SERPL DL<=0.01 NG/ML-MCNC: 4.38 NG/ML (ref 0–4.5)
RBC # BLD AUTO: 3.79 10E6/UL (ref 4.4–5.9)
RETICS # AUTO: 0.08 10E6/UL (ref 0.03–0.1)
RETICS/RBC NFR AUTO: 2.1 % (ref 0.5–2)
SODIUM SERPL-SCNC: 139 MMOL/L (ref 135–145)
WBC # BLD AUTO: 7.7 10E3/UL (ref 4–11)

## 2023-11-08 PROCEDURE — 83550 IRON BINDING TEST: CPT | Performed by: STUDENT IN AN ORGANIZED HEALTH CARE EDUCATION/TRAINING PROGRAM

## 2023-11-08 PROCEDURE — 36591 DRAW BLOOD OFF VENOUS DEVICE: CPT | Performed by: STUDENT IN AN ORGANIZED HEALTH CARE EDUCATION/TRAINING PROGRAM

## 2023-11-08 PROCEDURE — 80053 COMPREHEN METABOLIC PANEL: CPT | Performed by: STUDENT IN AN ORGANIZED HEALTH CARE EDUCATION/TRAINING PROGRAM

## 2023-11-08 PROCEDURE — 84153 ASSAY OF PSA TOTAL: CPT | Performed by: STUDENT IN AN ORGANIZED HEALTH CARE EDUCATION/TRAINING PROGRAM

## 2023-11-08 PROCEDURE — 84403 ASSAY OF TOTAL TESTOSTERONE: CPT | Performed by: STUDENT IN AN ORGANIZED HEALTH CARE EDUCATION/TRAINING PROGRAM

## 2023-11-08 PROCEDURE — 99215 OFFICE O/P EST HI 40 MIN: CPT | Performed by: STUDENT IN AN ORGANIZED HEALTH CARE EDUCATION/TRAINING PROGRAM

## 2023-11-08 PROCEDURE — 99417 PROLNG OP E/M EACH 15 MIN: CPT | Performed by: STUDENT IN AN ORGANIZED HEALTH CARE EDUCATION/TRAINING PROGRAM

## 2023-11-08 PROCEDURE — 85025 COMPLETE CBC W/AUTO DIFF WBC: CPT | Performed by: STUDENT IN AN ORGANIZED HEALTH CARE EDUCATION/TRAINING PROGRAM

## 2023-11-08 PROCEDURE — 82728 ASSAY OF FERRITIN: CPT | Performed by: STUDENT IN AN ORGANIZED HEALTH CARE EDUCATION/TRAINING PROGRAM

## 2023-11-08 PROCEDURE — 250N000011 HC RX IP 250 OP 636: Mod: JZ | Performed by: STUDENT IN AN ORGANIZED HEALTH CARE EDUCATION/TRAINING PROGRAM

## 2023-11-08 PROCEDURE — 85045 AUTOMATED RETICULOCYTE COUNT: CPT | Performed by: STUDENT IN AN ORGANIZED HEALTH CARE EDUCATION/TRAINING PROGRAM

## 2023-11-08 PROCEDURE — 99213 OFFICE O/P EST LOW 20 MIN: CPT | Performed by: STUDENT IN AN ORGANIZED HEALTH CARE EDUCATION/TRAINING PROGRAM

## 2023-11-08 RX ORDER — HEPARIN SODIUM (PORCINE) LOCK FLUSH IV SOLN 100 UNIT/ML 100 UNIT/ML
5 SOLUTION INTRAVENOUS ONCE
Status: COMPLETED | OUTPATIENT
Start: 2023-11-08 | End: 2023-11-08

## 2023-11-08 RX ADMIN — Medication 5 ML: at 09:04

## 2023-11-08 ASSESSMENT — PAIN SCALES - GENERAL: PAINLEVEL: MODERATE PAIN (4)

## 2023-11-08 NOTE — LETTER
11/8/2023         RE: Walter Reyes  [address] [facility] [id number]        Dear Colleague,    Thank you for referring your patient, Walter Reyes, to the [facility]. Please see a copy of my visit note below.        [facility] On[facility] Followup  Oncologist: Tabby  Nov 8, 2023    REASON FOR VISIT: Follow-up for metastatic castration-resistant prostate cancer.      INTERVAL HISTORY:     ONCOLOGY HISTORY: Mr. Walter eRyes is a 63 year old gentleman with a metastatic castration-sensitive prostate cancer and incidentally diagnosed stage 3 clear cell RCC (s/p radical R nephrectomy on 3/19/19) which is now 4.5+ years post-therapy without evidence of recurrence. His oncologic history is detailed below.     1/6/21  PSA = 0.29  3/31/21 PSA = 0.44  7/7/21  PSA = 0.47  11/2021 PSA = 1.05  -- Start XTANDI  12/16/21 PSA =0.22  2/11/22 PSA= 0.50  3/22/22 PSA=0.72  5/5/2022 PSA=1.79  7/11/2022 PSA=2.16 --Start cycle 1 docetaxel   8/22/22 PSA = 1.36  9/12/22 PSA = 1.09  10/24/22 Cycle #6 of Docetaxel. End of treatment  1/9/23  PSA =0.66  3/20/23  PSA=1.54  4/21/23 PSA = 1.81  T=15  5/22/23 C1 Pluvicto   06/07/23          PSA = 1.42  7/18/23 PSA=1.75, C2 Pluvicto   8/28/23 Transfer of care initial visit for Tabby.  PSA 2.06.  C3 Pluvicto scheduled for 8/30.  Proceed with dose as planned.  MR PUENTES femur, ortho referral, PSMA PET ordered for prior to follow-up.    10/2/23 Palliative RT to L femur metastasis complete.  PSMA PET with mixed response.  PSA 2.21.  RT to L femur  pending.  Continue with Dose #4 of Pluvicto despite mixed response to therapy.  Dex course for possible pain   Flare with RT.    11/8/23  Follow-up prior to Dose #5 Pluvicto.  More pain in chest/ribs/back. PSA 4.38.  Testosterone=3.  Rad onc referral for ribs.  Biopsy progressive lesion for     Progression on Pluvicto.  Cabazitaxel scheduled for follow-up appointment.  Pending Tempus  "testing.      ONCOLOGIC HISTORY:  1. 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 Naylor at [facility]. Prior PSA were 1.4 on 8/16/17, 2.4 on 6/28/16, 2.9 on 6/28/16, and as low as 0.4 on 3/26/2003.   - 1/08/2019: Consultation with Sarah Wilson CNP in Urology clinic. Repeat PSA 9.6.  - 1/16/2019: MRI prostate with contrast - \"This examination is characterized as PIRADS 5- very high probability. Clinically significant cancer is highly likely to be present. There is a large, invasive mass arising from the right peripheral zone and extending into the neurovascular bundle, seminal vesicle, and along the anterolateral right mesorectal fascia. Metastatic right external iliac lymph node. Metastatic lesion in the posterior/superior right acetabulum.\"  - 1/25/2019: CT abdomen and pelvis with IV contrast - \"1. Heterogeneous enhancing mass posteriorly in the upper pole of the right kidney measures 4.5 x 5.8 x 5.7 cm (AP by transverse by craniocaudal). It has a small nodular component extending posterior medially which abuts the right psoas muscle. This nodular extension measures 2.1 x 2.1 cm. Minimal stranding about the mass. No definite thrombus within the right renal vein. This renal mass is compatible with a renal cell carcinoma. A paraaortic lymph node situated immediately posterior to the left renal vein measures 1.1 cm in short axis, suspicious for metastasis. 2. A 2 cm right external iliac lymph node is also suspicious for metastases. 3. Multiple sclerotic osseous lesions suspicious for metastases. These include 0.8 and 0.6 cm sclerotic lesions laterally in the right iliac wing (images 53 and 62 respectively). Ill-defined groundglass density laterally in the right acetabulum measuring 1.7 x 1.2 cm corresponds with the lesion identified on MRI. A " "0.4 cm groundglass density in the left acetabulum. Sclerotic lesion in the left femoral neck measures 0.9 x 1.6 cm (image 81). Sclerotic metastases would be more compatible with prostate metastases. 4. A 3 subcentimeter hepatic lesions are indeterminate. Metastases would be a consideration. These can be further characterized with liver MRI.\"  - 1/25/2019: NM bone scan - \"There is focal bony uptake in the left femoral neck, right acetabulum, right of midline at the S1 or L5 level of the spine, within multiple bilateral ribs, in the C7 or T1 level of the spine, and anteriorly within the skull. Findings are suspicious for metastases.\"  - 1/31/19: CT chest with contrast - \" No suspicious nodules in the chest.  Stable appearance of a 5.8 cm right renal mass concerning for renal carcinoma until proven otherwise.  Stable indeterminant subcentimeter hypodensities in the liver.  Multifocal osteoblastic metastasis including 2.5 cm lesion in the right fifth rib posteriorly and a 1.6 cm lesion in the right third rib posteriorly. No lytic lesions identified.\"  - 2/6/19: CT-guided right sclerotic fifth rib lesion biopsy - \"Metastatic carcinoma, consistent with prostate primary.  Immunohistochemical stains performed show the metastatic carcinoma stains positive for NKX3.1 (prostate marker), negative for STACIE 3 and PAX8, which supports the above diagnosis.\"  - 2/15/19: Started Casodex 50mg every day and consented for the biobanking protocol. 3/18/19 - stopped Casodex.  - 2/19/19: Case discussed in tumor board - recommendation for nephectomy for suspected malignant right renal mass.   - 2/26/19: Started Lupron 22.5mg every 3 months.   - 5/8/19: Started Docetaxel 75mg/m2 IV every 3 weeks. 06/18/19 - cycle 3, 7/10/19 - cycle 4, 07/31/19 - cycle 5, 08/21/19 - cycle 6.   - 10/2/19: Restaging CT CAP and NM Bone Scan showed improved osseous disease, no evidence of recurrent or new metastatic disease.  - 1/5/20: Restaging CT CAP and NM " "Bone Scan showed stable osseous disease, no evidence of recurrent or new metastatic disease.  - 4/6/20: NM bone scan with slightly improved uptake in known skeletal mets. CT C/A/P with contrast with stable osseous mets, no yusuf enlargement, no new visceral mets etc.  - 04/08/20: Zometa every 3 months.  - 10/5/20: CT C/A/P with contrast and NM bone scan - SD.   - 1/4/21: CT C/A/P with contrast and NM bone scan - SD but slight increase in right 5th rib tracer uptake and in posterior L5 sclerosis.   - 03/29/21: CT C/A/P with contrast - single new right sacral 8mm bone lesion (suspicious), other bone mets stable. No visceral/yusuf disease. Nephrectomy site without recurrence. NM bone scan - SD but \"increased uptake associated with the prior lesions of the lower  cervical spine, posterior right fourth rib and right L5 posterior arch elements. Otherwise unchanged uptake associated with the proximal left femur and left anterior fourth rib. Similar uptake of the left halux, possibly secondary to degenerative osteoarthritis or gout.\"      Radiation history:   C7         3,000 cGy       06 X      8/05/2021        8/18/2021        13       10  2 Sacrum          2,500 cGy       18 X      4/12/2022        4/18/2022         6               Sacrum retreat               700 cGy        18 X      2/28/2023        2/28/2023    L femur Pending for October 2023.     2. Stage III ([id number]), grade 3 of 4, clear-cell RCC of right kidney:  - Incidentally diagnosed as above.   - Underwent curative-intent robotic right radical nephrectomy with Dr. Wesley Cleveland on 3/19/19. No tumor spillage per op report.   - Path showed: \"Histologic Type: Clear cell renal cell carcinoma; Sarcomatoid Features: Not identified; Histologic Grade: Nucleolar grade 3 (WHO/ISUP). Extent Tumor Size: 4.3 cm. Microscopic Tumor Extension: Tumor extension into renal sinus (in vascular structures). Margins: Negative. Tumor Necrosis: Present; focal. Lymph-Vascular " "Invasion: Not identified.  Pathologic Staging (pTNM) Primary Tumor (pT): pT3a: Tumor extends into renal vein branches/renal sinus. Regional Lymph Nodes (pN): pNX. Number of Lymph Nodes Examined: 0 Distant Metastasis (pM): pM N/A.\"  - Restaging scans as above.    PAST MEDICAL HISTORY:  Past Medical History:   Diagnosis Date    Cancer of kidney, right (H)     Complication of anesthesia     slow wakeup     Malignant neoplasm of prostate metastatic to bone (H) 2/14/2019    Thyroid disease      CURRENT MEDICATIONS:   Current Outpatient Medications:     atorvastatin (LIPITOR) 20 MG tablet, Take 60 mg by mouth daily , Disp: , Rfl:     buprenorphine (BUTRANS) 20 MCG/HR WK patch, Place 1 patch onto the skin every 7 days, Disp: 4 patch, Rfl: 0    calcium citrate-vitamin D (CITRACAL) 315-250 MG-UNIT TABS per tablet, Take 650 mg by mouth 2 times daily , Disp: , Rfl:     cycloSPORINE (RESTASIS) 0.05 % ophthalmic emulsion, Place 1 drop into both eyes 2 times daily , Disp: , Rfl:     dexAMETHasone (DECADRON) 4 MG tablet, Take 1 tablet (4 mg) by mouth daily, Disp: 7 tablet, Rfl: 2    gabapentin (NEURONTIN) 300 MG capsule, TAKE ONE CAPSULE BY MOUTH TWICE DAILY AND 2 CAPSULES AT BEDTIME, Disp: 120 capsule, Rfl: 3    Glucosamine-Chondroit-Vit C-Mn (GLUCOSAMINE 1500 COMPLEX) CAPS, Take 1 capsule by mouth daily, Disp: , Rfl:     levothyroxine (SYNTHROID/LEVOTHROID) 112 MCG tablet, Take 112 mcg by mouth daily, Disp: , Rfl:     loratadine (CLARITIN) 10 MG tablet, , Disp: , Rfl:     Multiple Vitamins-Minerals (MULTIVITAMIN ADULT EXTRA C PO), Take 1 tablet by mouth every 24 hours, Disp: , Rfl:     Nutritional Supplements (SALMON OIL) CAPS, Take 2 capsules by mouth daily , Disp: , Rfl:     omeprazole (PRILOSEC) 20 MG DR capsule, Take 20 mg by mouth daily, Disp: , Rfl:     oxyCODONE (ROXICODONE) 5 MG tablet, Take 1-2 tablets (5-10 mg) by mouth every 3 hours as needed for severe pain, Disp: 90 tablet, Rfl: 0    prochlorperazine (COMPAZINE) 10 " MG tablet, Take 1 tablet (10 mg) by mouth every 6 hours as needed for nausea or vomiting, Disp: 90 tablet, Rfl: 1    tamsulosin (FLOMAX) 0.4 MG capsule, Take 1 capsule (0.4 mg) by mouth daily, Disp: 30 capsule, Rfl: 3    triamterene-HCTZ (MAXZIDE-25) 37.5-25 MG tablet, Take 1 tablet by mouth daily, Disp: , Rfl:     naloxone (NARCAN) 4 MG/0.1ML nasal spray, Spray 1 spray (4 mg) into one nostril alternating nostrils as needed for opioid reversal every 2-3 minutes until assistance arrives (Patient not taking: Reported on 10/2/2023), Disp: 0.2 mL, Rfl: 1    Physical Exam:   /72 (BP Location: Right arm, Patient Position: Sitting, Cuff Size: Adult Large)   Pulse 58   Temp 98.2  F (36.8  C) (Oral)   Resp 16   Wt 120.7 kg (266 lb 1.6 oz)   SpO2 100%   BMI 37.11 kg/m    General: Patient appears well in no acute distress.   Skin: No visualized rash or lesions on visualized skin  Eyes: EOMI, no erythema, sclera icterus or discharge noted  Resp: Appears to be breathing comfortably without accessory muscle usage, speaking in full sentences, no cough  MSK: Appears to have normal range of motion based on visualized movements  Neurologic: No apparent tremors, facial movements symmetric  Psych: affect bright, alert and oriented    ECOG PS 1.    LABORATORY DATA:  Most Recent 3 CBC's:  Recent Labs   Lab Test 11/08/23  0856 10/10/23  1336 09/25/23  1022   WBC 7.7 7.3 3.4*   HGB 10.9* 12.2* 11.8*   MCV 88 91 89    249 232   ANEUTAUTO 6.4 5.0 1.9     Most Recent 3 BMP's:  Recent Labs   Lab Test 11/08/23  0856 10/10/23  1336 09/25/23  1022    139 139   POTASSIUM 4.1 3.6 3.6   CHLORIDE 107 103 103   CO2 22 24 24   BUN 16.0 23.1* 10.1   CR 0.86 0.99 0.98   ANIONGAP 10 12 12   BETHANY 9.2 9.0 8.8   * 118* 105*   PROTTOTAL 7.1 6.5 6.5   ALBUMIN 4.0 3.9 4.1    Most Recent 3 LFT's:  Recent Labs   Lab Test 11/08/23  0856 10/10/23  1336 09/25/23  1022   AST 31 19 27   ALT 16 23 17   ALKPHOS 177* 218* 170*   BILITOTAL  "0.2 0.3 0.3    Most Recent 2 TSH and T4:  Recent Labs   Lab Test 07/07/21  0834 03/31/21  0824   TSH 1.67 1.63       I reviewed the above labs today.    PSA trend, see oncology history.     No new imaging studies at time of encounter.      ASSESSMENT & PLAN: Mr. Reyes is a delightful 63 year old gentleman with metastatic castration sensitive prostate adenocarcinoma as well as an incidentally diagnosed stage III clear-cell renal cell carcinoma of the right kidney (s/p radical R nephrectomy 3/19/19), who is here for a follow-up visit and initiation of docetafor now metastatic castration-resistant prostate cancer.     1. Metastatic castration-resistant prostate cancer, with involvement of the bones and RPLN:   - Patient met the criteria for CHAARTED \"high-volume\" metastatic hormone-sensitive prostate cancer. He opted for docetaxel in combination with ADT (per JOSEFA, GETUG-AFU15, FELIPEEDE). He was subsequently treated with docetaxel x6 doses in the [facility] setting and enzalutamide.  He initiated Pluvicto in May 2023 with an initial slight decline in PSA, but this began to rise just prior to Cycle 3.  Given his XR evidence of progression in L femur and PSA rise, we re-evaluated his progression with PSMA PET scan in September 2023, with note of mixed response.  Reconsented for  [employer] on 8/28/23.  Due for 3 month collection at end of November 2023.    -Proceed with pluvicto dose #4.    -Zometa next due 1/24/2024 along with Lupron.   -Palliative to manage further opiate refills.   -Dexamethasone burst for RT and one on hand if needed for pain flares.   Let Oncology team know if this is used.  Refills sent 11/9/23.    -Cancel Pluvicto Dose for November due to symptomatic progression with PSA rise.    -Pending cabazitaxel and biopsy/genomics results.  Can also consider [id number] or Taggo CD3/PSMA engager as well moving forward.  I would give cabazitaxel a try at the moment while we are evaluating.      2. Bone " metastases:   - Noted to have osseous metastatic disease at the time of diagnosis. S/p radiation to C spine and sacrum (Re-irradiation to sacrum).   - worsening radiculopathy down starting in the low back and radiating down the left leg in June. MRI read was without new/acute abnormalities  - Pain continues to fluctuate but overall well controlled/managable   - pain mgmt per palliative care, on butrans patch now with addition of dilaudid.    - Encouraged to continue calcium and vitamin D supplementation; continue Zometa 4mg IV every ~3 months. Due next in November 2023.   - RT to L femur 10/2023.  Pending RT for R sided ribs 11/2023.       3. Stage 3, UISS-high risk ccRCC: s/p R nephrectomy   - Nephrectomy 3/19/19 and noted incidentally.  He is now 4.5 years post-op without evidence for recurrence.   - At this point, there is a minimal risk of recurrence of his RCC given the time since surgery without the use of adjuvant immunotherapy. There is no suspicious adenopathy or other features on his most recent imaging studies.    - Will continue to monitor with imaging planned for prostate cancer.      PLAN:     Stop Pluvicto. I will cancel your upcoming dose.   Radiation oncology referral for the rib pain.  We should make sure interventional radiology has seen you and has a biopsy site chosen before you start radiation.  I would hate to lose our preferred biopsy site to radiation.   Biopsy of the iliac bone or right femur.   CT scan if we can get it approved to evaluate for possible areas of progression.  This will likely be needed by radiation oncology as well for their planning purposes.    See me back on 11/27 or 12/4 with infusion appt after.  I will put in this request to hold you a spot.      A total of 75 minutes were spent on this patient on the day of the encounter, of which more than 50% of this time was used for counseling and coordination of care.  The patient and his wife were given the opportunity to ask  multiple questions today, all of which were answered to their satisfaction.     Omar Mcnair MD, PhD   of Medicine   Oncology/BMT/Cellular Therapies

## 2023-11-08 NOTE — PATIENT INSTRUCTIONS
Walter,   Here is what we discussed today.   Stop Pluvicto. I will cancel your upcoming dose.   Radiation oncology referral for the rib pain.  We should make sure interventional radiology has seen you and has a biopsy site chosen before you start radiation.  I would hate to lose our preferred biopsy site to radiation.   Biopsy of the iliac bone or right femur.   CT scan if we can get it approved to evaluate for possible areas of progression.  This will likely be needed by radiation oncology as well for their planning purposes.    See me back on 11/27 or 12/4 with infusion appt after.  I will put in this request to hold you a spot.

## 2023-11-08 NOTE — NURSING NOTE
"Oncology Rooming Note    November 8, 2023 9:09 AM   Walter Reyes is a 63 year old male who presents for:    Chief Complaint   Patient presents with    Port Draw     Labs drawn via port by RN. VS taken.    Oncology Clinic Visit     [facility] RETURN - PROSTATE CANCER     Initial Vitals: /72 (BP Location: Right arm, Patient Position: Sitting, Cuff Size: Adult Large)   Pulse 58   Temp 98.2  F (36.8  C) (Oral)   Resp 16   Wt 120.7 kg (266 lb 1.6 oz)   SpO2 100%   BMI 37.11 kg/m   Estimated body mass index is 37.11 kg/m  as calculated from the following:    Height as of 9/20/23: 1.803 m (5' 11\").    Weight as of this encounter: 120.7 kg (266 lb 1.6 oz). Body surface area is 2.46 meters squared.  Moderate Pain (4) Comment: Data Unavailable   No LMP for male patient.  Allergies reviewed: Yes  Medications reviewed: Yes    Medications: Medication refills not needed today.  Pharmacy name entered into EPIC:    [facility] [place], [place] - [address] [facility] [id number] [place], [place] - [address] [facility] - [place], [place] - [address] [facility] [place], [place] - [phone]  [facility] [place], [place] - [address] [facility]    Scott Leobardo Munroe LPN              "

## 2023-11-08 NOTE — PROGRESS NOTES
[facility] Oncology Followup  Oncologist: Tabby  Nov 8, 2023    REASON FOR VISIT: Follow-up for metastatic castration-resistant prostate cancer.      INTERVAL HISTORY:     ONCOLOGY HISTORY: Mr. Walter Reyes is a 63 year old gentleman with a metastatic castration-sensitive prostate cancer and incidentally diagnosed stage 3 clear cell RCC (s/p radical R nephrectomy on 3/19/19) which is now 4.5+ years post-therapy without evidence of recurrence. His oncologic history is detailed below.     1/6/21  PSA = 0.29  3/31/21 PSA = 0.44  7/7/21  PSA = 0.47  11/2021 PSA = 1.05  -- Start XTANDI  12/16/21 PSA =0.22  2/11/22 PSA= 0.50  3/22/22 PSA=0.72  5/5/2022 PSA=1.79  7/11/2022 PSA=2.16 --Start cycle 1 docetaxel   8/22/22 PSA = 1.36  9/12/22 PSA = 1.09  10/24/22 Cycle #6 of Docetaxel. End of treatment  1/9/23  PSA =0.66  3/20/23  PSA=1.54  4/21/23 PSA = 1.81  T=15  5/22/23 C1 Pluvicto   06/07/23          PSA = 1.42  7/18/23 PSA=1.75, C2 Pluvicto   8/28/23 Transfer of care initial visit for Tabby.  PSA 2.06.  C3 Pluvicto scheduled for 8/30.  Proceed with dose as planned.  MR DELORIS femur, ortho referral, PSMA PET ordered for prior to follow-up.    10/2/23 Palliative RT to L femur metastasis complete.  PSMA PET with mixed response.  PSA 2.21.  RT to L femur  pending.  Continue with Dose #4 of Pluvicto despite mixed response to therapy.  Dex course for possible pain   Flare with RT.    11/8/23  Follow-up prior to Dose #5 Pluvicto.  More pain in chest/ribs/back. PSA 4.38.  Testosterone=3.  Rad onc referral for ribs.  Biopsy progressive lesion for     Progression on Pluvicto.  Cabazitaxel scheduled for follow-up appointment.  Pending Tempus testing.      ONCOLOGIC HISTORY:  1. 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 Naylor at [facility]. Prior  "PSA were 1.4 on 8/16/17, 2.4 on 6/28/16, 2.9 on 6/28/16, and as low as 0.4 on 3/26/2003.   - 1/08/2019: Consultation with Sarah Wilson CNP in Urology clinic. Repeat PSA 9.6.  - 1/16/2019: MRI prostate with contrast - \"This examination is characterized as PIRADS 5- very high probability. Clinically significant cancer is highly likely to be present. There is a large, invasive mass arising from the right peripheral zone and extending into the neurovascular bundle, seminal vesicle, and along the anterolateral right mesorectal fascia. Metastatic right external iliac lymph node. Metastatic lesion in the posterior/superior right acetabulum.\"  - 1/25/2019: CT abdomen and pelvis with IV contrast - \"1. Heterogeneous enhancing mass posteriorly in the upper pole of the right kidney measures 4.5 x 5.8 x 5.7 cm (AP by transverse by craniocaudal). It has a small nodular component extending posterior medially which abuts the right psoas muscle. This nodular extension measures 2.1 x 2.1 cm. Minimal stranding about the mass. No definite thrombus within the right renal vein. This renal mass is compatible with a renal cell carcinoma. A paraaortic lymph node situated immediately posterior to the left renal vein measures 1.1 cm in short axis, suspicious for metastasis. 2. A 2 cm right external iliac lymph node is also suspicious for metastases. 3. Multiple sclerotic osseous lesions suspicious for metastases. These include 0.8 and 0.6 cm sclerotic lesions laterally in the right iliac wing (images 53 and 62 respectively). Ill-defined groundglass density laterally in the right acetabulum measuring 1.7 x 1.2 cm corresponds with the lesion identified on MRI. A 0.4 cm groundglass density in the left acetabulum. Sclerotic lesion in the left femoral neck measures 0.9 x 1.6 cm (image 81). Sclerotic metastases would be more compatible with prostate metastases. 4. A 3 subcentimeter hepatic lesions are indeterminate. Metastases would be a " "consideration. These can be further characterized with liver MRI.\"  - 1/25/2019: NM bone scan - \"There is focal bony uptake in the left femoral neck, right acetabulum, right of midline at the S1 or L5 level of the spine, within multiple bilateral ribs, in the C7 or T1 level of the spine, and anteriorly within the skull. Findings are suspicious for metastases.\"  - 1/31/19: CT chest with contrast - \" No suspicious nodules in the chest.  Stable appearance of a 5.8 cm right renal mass concerning for renal carcinoma until proven otherwise.  Stable indeterminant subcentimeter hypodensities in the liver.  Multifocal osteoblastic metastasis including 2.5 cm lesion in the right fifth rib posteriorly and a 1.6 cm lesion in the right third rib posteriorly. No lytic lesions identified.\"  - 2/6/19: CT-guided right sclerotic fifth rib lesion biopsy - \"Metastatic carcinoma, consistent with prostate primary.  Immunohistochemical stains performed show the metastatic carcinoma stains positive for NKX3.1 (prostate marker), negative for STACIE 3 and PAX8, which supports the above diagnosis.\"  - 2/15/19: Started Casodex 50mg every day and consented for the biobanking protocol. 3/18/19 - stopped Casodex.  - 2/19/19: Case discussed in tumor board - recommendation for nephectomy for suspected malignant right renal mass.   - 2/26/19: Started Lupron 22.5mg every 3 months.   - 5/8/19: Started Docetaxel 75mg/m2 IV every 3 weeks. 06/18/19 - cycle 3, 7/10/19 - cycle 4, 07/31/19 - cycle 5, 08/21/19 - cycle 6.   - 10/2/19: Restaging CT CAP and NM Bone Scan showed improved osseous disease, no evidence of recurrent or new metastatic disease.  - 1/5/20: Restaging CT CAP and NM Bone Scan showed stable osseous disease, no evidence of recurrent or new metastatic disease.  - 4/6/20: NM bone scan with slightly improved uptake in known skeletal mets. CT C/A/P with contrast with stable osseous mets, no yusuf enlargement, no new visceral mets etc.  - 04/08/20: " "Zometa every 3 months.  - 10/5/20: CT C/A/P with contrast and NM bone scan - SD.   - 1/4/21: CT C/A/P with contrast and NM bone scan - SD but slight increase in right 5th rib tracer uptake and in posterior L5 sclerosis.   - 03/29/21: CT C/A/P with contrast - single new right sacral 8mm bone lesion (suspicious), other bone mets stable. No visceral/yusuf disease. Nephrectomy site without recurrence. NM bone scan - SD but \"increased uptake associated with the prior lesions of the lower  cervical spine, posterior right fourth rib and right L5 posterior arch elements. Otherwise unchanged uptake associated with the proximal left femur and left anterior fourth rib. Similar uptake of the left halux, possibly secondary to degenerative osteoarthritis or gout.\"      Radiation history:   C7         3,000 cGy       06 X      8/05/2021        8/18/2021        13       10  2 Sacrum          2,500 cGy       18 X      4/12/2022        4/18/2022         6               Sacrum retreat               700 cGy        18 X      2/28/2023        2/28/2023    L femur Pending for October 2023.     2. Stage III ([id number]), grade 3 of 4, clear-cell RCC of right kidney:  - Incidentally diagnosed as above.   - Underwent curative-intent robotic right radical nephrectomy with Dr. Wesley Clevealnd on 3/19/19. No tumor spillage per op report.   - Path showed: \"Histologic Type: Clear cell renal cell carcinoma; Sarcomatoid Features: Not identified; Histologic Grade: Nucleolar grade 3 (WHO/ISUP). Extent Tumor Size: 4.3 cm. Microscopic Tumor Extension: Tumor extension into renal sinus (in vascular structures). Margins: Negative. Tumor Necrosis: Present; focal. Lymph-Vascular Invasion: Not identified.  Pathologic Staging (pTNM) Primary Tumor (pT): pT3a: Tumor extends into renal vein branches/renal sinus. Regional Lymph Nodes (pN): pNX. Number of Lymph Nodes Examined: 0 Distant Metastasis (pM): pM N/A.\"  - Restaging scans as above.    PAST MEDICAL " HISTORY:  Past Medical History:   Diagnosis Date    Cancer of kidney, right (H)     Complication of anesthesia     slow wakeup     Malignant neoplasm of prostate metastatic to bone (H) 2/14/2019    Thyroid disease      CURRENT MEDICATIONS:   Current Outpatient Medications:     atorvastatin (LIPITOR) 20 MG tablet, Take 60 mg by mouth daily , Disp: , Rfl:     buprenorphine (BUTRANS) 20 MCG/HR WK patch, Place 1 patch onto the skin every 7 days, Disp: 4 patch, Rfl: 0    calcium citrate-vitamin D (CITRACAL) 315-250 MG-UNIT TABS per tablet, Take 650 mg by mouth 2 times daily , Disp: , Rfl:     cycloSPORINE (RESTASIS) 0.05 % ophthalmic emulsion, Place 1 drop into both eyes 2 times daily , Disp: , Rfl:     dexAMETHasone (DECADRON) 4 MG tablet, Take 1 tablet (4 mg) by mouth daily, Disp: 7 tablet, Rfl: 2    gabapentin (NEURONTIN) 300 MG capsule, TAKE ONE CAPSULE BY MOUTH TWICE DAILY AND 2 CAPSULES AT BEDTIME, Disp: 120 capsule, Rfl: 3    Glucosamine-Chondroit-Vit C-Mn (GLUCOSAMINE 1500 COMPLEX) CAPS, Take 1 capsule by mouth daily, Disp: , Rfl:     levothyroxine (SYNTHROID/LEVOTHROID) 112 MCG tablet, Take 112 mcg by mouth daily, Disp: , Rfl:     loratadine (CLARITIN) 10 MG tablet, , Disp: , Rfl:     Multiple Vitamins-Minerals (MULTIVITAMIN ADULT EXTRA C PO), Take 1 tablet by mouth every 24 hours, Disp: , Rfl:     Nutritional Supplements (SALMON OIL) CAPS, Take 2 capsules by mouth daily , Disp: , Rfl:     omeprazole (PRILOSEC) 20 MG DR capsule, Take 20 mg by mouth daily, Disp: , Rfl:     oxyCODONE (ROXICODONE) 5 MG tablet, Take 1-2 tablets (5-10 mg) by mouth every 3 hours as needed for severe pain, Disp: 90 tablet, Rfl: 0    prochlorperazine (COMPAZINE) 10 MG tablet, Take 1 tablet (10 mg) by mouth every 6 hours as needed for nausea or vomiting, Disp: 90 tablet, Rfl: 1    tamsulosin (FLOMAX) 0.4 MG capsule, Take 1 capsule (0.4 mg) by mouth daily, Disp: 30 capsule, Rfl: 3    triamterene-HCTZ (MAXZIDE-25) 37.5-25 MG tablet, Take 1  tablet by mouth daily, Disp: , Rfl:     naloxone (NARCAN) 4 MG/0.1ML nasal spray, Spray 1 spray (4 mg) into one nostril alternating nostrils as needed for opioid reversal every 2-3 minutes until assistance arrives (Patient not taking: Reported on 10/2/2023), Disp: 0.2 mL, Rfl: 1    Physical Exam:   /72 (BP Location: Right arm, Patient Position: Sitting, Cuff Size: Adult Large)   Pulse 58   Temp 98.2  F (36.8  C) (Oral)   Resp 16   Wt 120.7 kg (266 lb 1.6 oz)   SpO2 100%   BMI 37.11 kg/m    General: Patient appears well in no acute distress.   Skin: No visualized rash or lesions on visualized skin  Eyes: EOMI, no erythema, sclera icterus or discharge noted  Resp: Appears to be breathing comfortably without accessory muscle usage, speaking in full sentences, no cough  MSK: Appears to have normal range of motion based on visualized movements  Neurologic: No apparent tremors, facial movements symmetric  Psych: affect bright, alert and oriented    ECOG PS 1.    LABORATORY DATA:  Most Recent 3 CBC's:  Recent Labs   Lab Test 11/08/23  0856 10/10/23  1336 09/25/23  1022   WBC 7.7 7.3 3.4*   HGB 10.9* 12.2* 11.8*   MCV 88 91 89    249 232   ANEUTAUTO 6.4 5.0 1.9     Most Recent 3 BMP's:  Recent Labs   Lab Test 11/08/23  0856 10/10/23  1336 09/25/23  1022    139 139   POTASSIUM 4.1 3.6 3.6   CHLORIDE 107 103 103   CO2 22 24 24   BUN 16.0 23.1* 10.1   CR 0.86 0.99 0.98   ANIONGAP 10 12 12   BETHANY 9.2 9.0 8.8   * 118* 105*   PROTTOTAL 7.1 6.5 6.5   ALBUMIN 4.0 3.9 4.1    Most Recent 3 LFT's:  Recent Labs   Lab Test 11/08/23  0856 10/10/23  1336 09/25/23  1022   AST 31 19 27   ALT 16 23 17   ALKPHOS 177* 218* 170*   BILITOTAL 0.2 0.3 0.3    Most Recent 2 TSH and T4:  Recent Labs   Lab Test 07/07/21  0834 03/31/21  0824   TSH 1.67 1.63       I reviewed the above labs today.    PSA trend, see oncology history.     No new imaging studies at time of encounter.      ASSESSMENT & PLAN: Mr. Reyes  "is a delightful 63 year old gentleman with metastatic castration sensitive prostate adenocarcinoma as well as an incidentally diagnosed stage III clear-cell renal cell carcinoma of the right kidney (s/p radical R nephrectomy 3/19/19), who is here for a follow-up visit and initiation of docetafor now metastatic castration-resistant prostate cancer.     1. Metastatic castration-resistant prostate cancer, with involvement of the bones and RPLN:   - Patient met the criteria for CHAARTED \"high-volume\" metastatic hormone-sensitive prostate cancer. He opted for docetaxel in combination with ADT (per JOSEFA, GETUG-AFU15, STAMPEDE). He was subsequently treated with docetaxel x6 doses in the [facility] setting and enzalutamide.  He initiated Pluvicto in May 2023 with an initial slight decline in PSA, but this began to rise just prior to Cycle 3.  Given his XR evidence of progression in L femur and PSA rise, we re-evaluated his progression with PSMA PET scan in September 2023, with note of mixed response.  Reconsented for  [employer] on 8/28/23.  Due for 3 month collection at end of November 2023.    -Proceed with pluvicto dose #4.    -Zometa next due 1/24/2024 along with Lupron.   -Palliative to manage further opiate refills.   -Dexamethasone burst for RT and one on hand if needed for pain flares.   Let Oncology team know if this is used.  Refills sent 11/9/23.    -Cancel Pluvicto Dose for November due to symptomatic progression with PSA rise.    -Pending cabazitaxel and biopsy/genomics results.  Can also consider [id number] or [employer] CD3/PSMA engager as well moving forward.  I would give cabazitaxel a try at the moment while we are evaluating.      2. Bone metastases:   - Noted to have osseous metastatic disease at the time of diagnosis. S/p radiation to C spine and sacrum (Re-irradiation to sacrum).   - worsening radiculopathy down starting in the low back and radiating down the left leg in June. MRI read was without new/acute " abnormalities  - Pain continues to fluctuate but overall well controlled/managable   - pain mgmt per palliative care, on butrans patch now with addition of dilaudid.    - Encouraged to continue calcium and vitamin D supplementation; continue Zometa 4mg IV every ~3 months. Due next in November 2023.   - RT to L femur 10/2023.  Pending RT for R sided ribs 11/2023.       3. Stage 3, UISS-high risk ccRCC: s/p R nephrectomy   - Nephrectomy 3/19/19 and noted incidentally.  He is now 4.5 years post-op without evidence for recurrence.   - At this point, there is a minimal risk of recurrence of his RCC given the time since surgery without the use of adjuvant immunotherapy. There is no suspicious adenopathy or other features on his most recent imaging studies.    - Will continue to monitor with imaging planned for prostate cancer.      PLAN:     Stop Pluvicto. I will cancel your upcoming dose.   Radiation oncology referral for the rib pain.  We should make sure interventional radiology has seen you and has a biopsy site chosen before you start radiation.  I would hate to lose our preferred biopsy site to radiation.   Biopsy of the iliac bone or right femur.   CT scan if we can get it approved to evaluate for possible areas of progression.  This will likely be needed by radiation oncology as well for their planning purposes.    See me back on 11/27 or 12/4 with infusion appt after.  I will put in this request to hold you a spot.      A total of 75 minutes were spent on this patient on the day of the encounter, of which more than 50% of this time was used for counseling and coordination of care.  The patient and his wife were given the opportunity to ask multiple questions today, all of which were answered to their satisfaction.     Omar Mcnair MD, PhD   of Medicine   Oncology/BMT/Cellular Therapies

## 2023-11-08 NOTE — NURSING NOTE
"Chief Complaint   Patient presents with    Port Draw     Labs drawn via port by RN. VS taken.     Port accessed with 20 gauge, 3/4\" power needle by RN, labs collected, line flushed with saline and heparin, then de-accessed.  Vitals taken. Pt checked in for appointment(s).     Talya Sal, RN    "

## 2023-11-08 NOTE — PROGRESS NOTES
New Patient Radiation Oncology Nurse Navigator Note     Referring provider:   Omar Mcnair MD   Oncology Adult  [facility]  [phone]     Referred to (specialty): Radiation Oncology    Requested provider (if applicable):   [facility] [place]: [phone]     Date Referral Received:   11/18/23     Evaluation for :   Malignant neoplasm of prostate metastatic to bone (H)Chest wall/scapula pain.  Eval for SBRT for pain palliation.  Pending biopsy with [facility],  Please defer therapy until bx is complete if possible.     Clinical History (per Nurse review of records provided):    Active patient of Dr. Albert, already has follow up scheduled 11/24.  I have sent an in-basket message to rad onc nurses to review if sooner appointment is needed.  I will close new referral out at this time.

## 2023-11-09 RX ORDER — DEXAMETHASONE 4 MG/1
4 TABLET ORAL DAILY
Qty: 7 TABLET | Refills: 2 | Status: SHIPPED | OUTPATIENT
Start: 2023-11-09

## 2023-11-10 ENCOUNTER — ANCILLARY PROCEDURE (OUTPATIENT)
Dept: CT IMAGING | Facility: CLINIC | Age: 63
End: 2023-11-10
Attending: STUDENT IN AN ORGANIZED HEALTH CARE EDUCATION/TRAINING PROGRAM
Payer: COMMERCIAL

## 2023-11-10 DIAGNOSIS — C79.51 MALIGNANT NEOPLASM OF PROSTATE METASTATIC TO BONE (H): ICD-10-CM

## 2023-11-10 DIAGNOSIS — C61 MALIGNANT NEOPLASM OF PROSTATE METASTATIC TO BONE (H): ICD-10-CM

## 2023-11-10 LAB — TESTOST SERPL-MCNC: 3 NG/DL (ref 240–950)

## 2023-11-10 PROCEDURE — 74177 CT ABD & PELVIS W/CONTRAST: CPT | Mod: GC | Performed by: STUDENT IN AN ORGANIZED HEALTH CARE EDUCATION/TRAINING PROGRAM

## 2023-11-10 PROCEDURE — 71260 CT THORAX DX C+: CPT | Mod: GC | Performed by: STUDENT IN AN ORGANIZED HEALTH CARE EDUCATION/TRAINING PROGRAM

## 2023-11-10 RX ORDER — IOPAMIDOL 755 MG/ML
122 INJECTION, SOLUTION INTRAVASCULAR ONCE
Status: COMPLETED | OUTPATIENT
Start: 2023-11-10 | End: 2023-11-10

## 2023-11-10 RX ADMIN — IOPAMIDOL 122 ML: 755 INJECTION, SOLUTION INTRAVASCULAR at 06:56

## 2023-11-10 NOTE — DISCHARGE INSTRUCTIONS

## 2023-11-13 ENCOUNTER — MYC MEDICAL ADVICE (OUTPATIENT)
Dept: ONCOLOGY | Facility: CLINIC | Age: 63
End: 2023-11-13

## 2023-11-15 ENCOUNTER — VIRTUAL VISIT (OUTPATIENT)
Dept: PALLIATIVE MEDICINE | Facility: CLINIC | Age: 63
End: 2023-11-15
Payer: COMMERCIAL

## 2023-11-15 VITALS
BODY MASS INDEX: 36.4 KG/M2 | HEIGHT: 71 IN | DIASTOLIC BLOOD PRESSURE: 77 MMHG | WEIGHT: 260 LBS | SYSTOLIC BLOOD PRESSURE: 104 MMHG

## 2023-11-15 DIAGNOSIS — C61 PROSTATE CANCER (H): ICD-10-CM

## 2023-11-15 DIAGNOSIS — G89.3 CANCER ASSOCIATED PAIN: Primary | ICD-10-CM

## 2023-11-15 PROCEDURE — 99215 OFFICE O/P EST HI 40 MIN: CPT | Mod: VID | Performed by: NURSE PRACTITIONER

## 2023-11-15 RX ORDER — FENTANYL 12.5 UG/1
1 PATCH TRANSDERMAL
Qty: 10 PATCH | Refills: 0 | Status: SHIPPED | OUTPATIENT
Start: 2023-11-15 | End: 2023-11-22

## 2023-11-15 ASSESSMENT — PAIN SCALES - GENERAL: PAINLEVEL: MODERATE PAIN (4)

## 2023-11-15 NOTE — PATIENT INSTRUCTIONS
Thank you for meeting with us in the [facility].    How to get a hold of us:  For non-urgent matters, MyChart works best.    For more urgent matters, or if you prefer not to use MyChart, call our clinic nurse coordinator Sherry Palma RN at [phone] or [phone]    We have an on-call number for evenings and weekends. Please call this only if you are having uncontrolled symptoms or serious side effects from your medicines: [phone].     For refills, please give us a week (5 working days) notice. We don't always have providers available everyday to do refills. If you call the day you run out of your medicine, we may not be able to refill it in time, so call 5 days in advance!     no nose bleeding/no gum bleeding

## 2023-11-15 NOTE — NURSING NOTE
Is the patient currently in the state of [place]? YES    Visit mode:VIDEO    If the visit is dropped, the patient can be reconnected by: VIDEO VISIT: Send to e-mail at: [email or web address]    Will anyone else be joining the visit? NO  (If patient encounters technical issues they should call [phone])    How would you like to obtain your AVS? MyChart    Are changes needed to the allergy or medication list? Pt stated no changes to allergies and Pt stated no med changes    Reason for visit: MERCED MOREIRAF

## 2023-11-16 ENCOUNTER — TELEPHONE (OUTPATIENT)
Dept: ONCOLOGY | Facility: CLINIC | Age: 63
End: 2023-11-16

## 2023-11-16 NOTE — TELEPHONE ENCOUNTER
FMLA forms received via Right[employer]ax from [facility].      Forms to be completed and put in folder for provider to approve.    Fax #:  [phone]  Claim: [id number]    Loraine Mcfarland

## 2023-11-17 NOTE — TELEPHONE ENCOUNTER
FMLA forms filled out and put in providers folder for review and signature.      Loraine Mcfarland

## 2023-11-20 NOTE — TELEPHONE ENCOUNTER
[facility] paperwork completed, checked for accuracy, signed and faxed to [employer] @ [phone]. A copy was made, sent to scanning and original mailed to patient at home address.    Successful transmission verified in Right Fax.      Danay Mcfarland

## 2023-11-21 ENCOUNTER — HOSPITAL ENCOUNTER (OUTPATIENT)
Dept: CT IMAGING | Facility: CLINIC | Age: 63
Discharge: HOME | End: 2023-11-21
Attending: STUDENT IN AN ORGANIZED HEALTH CARE EDUCATION/TRAINING PROGRAM | Admitting: STUDENT IN AN ORGANIZED HEALTH CARE EDUCATION/TRAINING PROGRAM
Payer: COMMERCIAL

## 2023-11-21 VITALS
OXYGEN SATURATION: 98 % | SYSTOLIC BLOOD PRESSURE: 142 MMHG | RESPIRATION RATE: 18 BRPM | HEART RATE: 48 BPM | DIASTOLIC BLOOD PRESSURE: 96 MMHG

## 2023-11-21 DIAGNOSIS — C61 MALIGNANT NEOPLASM OF PROSTATE METASTATIC TO BONE (H): Primary | ICD-10-CM

## 2023-11-21 DIAGNOSIS — C79.51 MALIGNANT NEOPLASM OF PROSTATE METASTATIC TO BONE (H): Primary | ICD-10-CM

## 2023-11-21 LAB — INR PPP: 1.01 (ref 0.85–1.15)

## 2023-11-21 PROCEDURE — 250N000011 HC RX IP 250 OP 636: Performed by: RADIOLOGY

## 2023-11-21 PROCEDURE — 36415 COLL VENOUS BLD VENIPUNCTURE: CPT | Performed by: RADIOLOGY

## 2023-11-21 PROCEDURE — 272N000431 CT BONE BIOPSY DEEP

## 2023-11-21 PROCEDURE — 250N000009 HC RX 250: Performed by: PHYSICIAN ASSISTANT

## 2023-11-21 PROCEDURE — 85610 PROTHROMBIN TIME: CPT | Performed by: RADIOLOGY

## 2023-11-21 PROCEDURE — 88311 DECALCIFY TISSUE: CPT | Mod: TC | Performed by: STUDENT IN AN ORGANIZED HEALTH CARE EDUCATION/TRAINING PROGRAM

## 2023-11-21 PROCEDURE — C1830 POWER BONE MARROW BX NEEDLE: HCPCS

## 2023-11-21 PROCEDURE — 250N000011 HC RX IP 250 OP 636: Mod: JZ | Performed by: PHYSICIAN ASSISTANT

## 2023-11-21 RX ORDER — FENTANYL CITRATE 50 UG/ML
25-50 INJECTION, SOLUTION INTRAMUSCULAR; INTRAVENOUS EVERY 5 MIN PRN
Status: DISCONTINUED | OUTPATIENT
Start: 2023-11-21 | End: 2023-11-22 | Stop reason: HOSPADM

## 2023-11-21 RX ORDER — NALOXONE HYDROCHLORIDE 0.4 MG/ML
0.4 INJECTION, SOLUTION INTRAMUSCULAR; INTRAVENOUS; SUBCUTANEOUS
Status: DISCONTINUED | OUTPATIENT
Start: 2023-11-21 | End: 2023-11-22 | Stop reason: HOSPADM

## 2023-11-21 RX ORDER — FLUMAZENIL 0.1 MG/ML
0.2 INJECTION, SOLUTION INTRAVENOUS
Status: DISCONTINUED | OUTPATIENT
Start: 2023-11-21 | End: 2023-11-22 | Stop reason: HOSPADM

## 2023-11-21 RX ORDER — HEPARIN SODIUM (PORCINE) LOCK FLUSH IV SOLN 100 UNIT/ML 100 UNIT/ML
5 SOLUTION INTRAVENOUS ONCE
Status: COMPLETED | OUTPATIENT
Start: 2023-11-21 | End: 2023-11-21

## 2023-11-21 RX ORDER — NALOXONE HYDROCHLORIDE 0.4 MG/ML
0.2 INJECTION, SOLUTION INTRAMUSCULAR; INTRAVENOUS; SUBCUTANEOUS
Status: DISCONTINUED | OUTPATIENT
Start: 2023-11-21 | End: 2023-11-22 | Stop reason: HOSPADM

## 2023-11-21 RX ADMIN — HEPARIN SODIUM (PORCINE) LOCK FLUSH IV SOLN 100 UNIT/ML 5 ML: 100 SOLUTION at 12:56

## 2023-11-21 RX ADMIN — MIDAZOLAM HYDROCHLORIDE 1 MG: 1 INJECTION, SOLUTION INTRAMUSCULAR; INTRAVENOUS at 11:31

## 2023-11-21 RX ADMIN — LIDOCAINE HYDROCHLORIDE 10 ML: 10 INJECTION, SOLUTION EPIDURAL; INFILTRATION; INTRACAUDAL; PERINEURAL at 11:28

## 2023-11-21 RX ADMIN — FENTANYL CITRATE 50 MCG: 50 INJECTION, SOLUTION INTRAMUSCULAR; INTRAVENOUS at 11:24

## 2023-11-21 RX ADMIN — MIDAZOLAM HYDROCHLORIDE 1 MG: 1 INJECTION, SOLUTION INTRAMUSCULAR; INTRAVENOUS at 11:24

## 2023-11-21 RX ADMIN — FENTANYL CITRATE 25 MCG: 50 INJECTION, SOLUTION INTRAMUSCULAR; INTRAVENOUS at 11:31

## 2023-11-21 NOTE — PROGRESS NOTES
I called and left a voicemail message informing him that I am making a slight change to his arrival time to 7:15 am for scheduled procedure date 2/05/2021 and that I am sending New Updated instructions via [employer].    Patient and his wife are scheduled back Right illiac crest bone biopsy complete. Pt tolerated well. Received 2 mg versed, 75 mcg fentanyl. Samples obtained and placed in Saline. Sent to lab. Site at right low back, covered with bandage. Pt monitored 1 hour post procedure, see EMR for details. Reviewed discharge instructions with pt and pt's wife. Pt discharged with wife.

## 2023-11-21 NOTE — PROCEDURES
Interventional Radiology Post-Procedure Note     Patient name:  Walter Reyes  MRN:  [id number]   Date:  11/21/2023     Procedure(s): CT-guided biopsy - right iliac bone lesion    Attending:  Megan    Sedation:  Moderate sedation    Pre/Post Procedure Diagnosis: Prostate carcinoma    Drains/Lines:  None    Specimen(s):  x2 11g samples sent for pathology    Estimated Blood Loss:  Minimal    Complications:  None     Findings:  Uneventful procedure, please see dictation in PACS or under the Imaging tab in [facility] for detailed procedure note.    Plan:  Bedrest for 1 hour post-procedure. May discharge after precautions if criteria met.      Jaime Guzman MD  Vascular & Interventional Radiology  [facility]  11/21/2023  11:47 AM

## 2023-11-22 ENCOUNTER — TELEPHONE (OUTPATIENT)
Dept: PALLIATIVE CARE | Facility: CLINIC | Age: 63
End: 2023-11-22

## 2023-11-22 DIAGNOSIS — G89.3 CANCER ASSOCIATED PAIN: ICD-10-CM

## 2023-11-22 DIAGNOSIS — C61 PROSTATE CANCER (H): ICD-10-CM

## 2023-11-22 RX ORDER — FENTANYL 12.5 UG/1
2 PATCH TRANSDERMAL
COMMUNITY
Start: 2023-11-22 | End: 2023-11-28

## 2023-11-22 NOTE — TELEPHONE ENCOUNTER
"At the request of Dean Zeng CNP, patient called to check in on pain and fentanyl patch.  Patient reports he started fentanyl patch on 11/17.  Reports he does not feel any difference between the fentanyl patch and the Butrans patch.  Reports his pain is \"ramping up daily.\"  He has been taking 1 oxycodone at bedtime and Tylenol throughout the day.  Today was the first day he took an oxycodone in the morning because of the pain throughout his body.    Will update Dean Zeng.    Patient verbalized understanding and had no further questions.    Adriana Arreola RN  Palliative Care Nurse Clinician    [phone] (Direct)  [phone] (Main)  [phone] (Appointment Scheduling)                                    ----- Message from LATESHA Seaman CNP sent at 11/15/2023 11:25 AM CST -----  Can you put on the list for one of us to check on next week---assuming fentanyl patch PA goes through?     "

## 2023-11-22 NOTE — CONFIDENTIAL NOTE
Palliative Care:    Walter reports ongoing, even worsened pain since stopping Butrans and starting Fentanyl patch 12mcg/hr. He feels that pain is overall worse related to worsening metastatic cancer burden. He also had biopsy recently that is sore.     Per my discussion with Walter, increase Fentanyl patch to two 12 mcg/hr patches and monitor. He will remove old one and place 2 new patches. Continue PRN oxycodone IR.     We will check in next week.     LATESHA Seaman

## 2023-11-24 ENCOUNTER — VIRTUAL VISIT (OUTPATIENT)
Dept: RADIATION ONCOLOGY | Facility: CLINIC | Age: 63
End: 2023-11-24
Attending: RADIOLOGY
Payer: COMMERCIAL

## 2023-11-24 DIAGNOSIS — C61 MALIGNANT NEOPLASM OF PROSTATE METASTATIC TO BONE (H): Primary | ICD-10-CM

## 2023-11-24 DIAGNOSIS — C79.51 MALIGNANT NEOPLASM OF PROSTATE METASTATIC TO BONE (H): Primary | ICD-10-CM

## 2023-11-24 PROCEDURE — 99215 OFFICE O/P EST HI 40 MIN: CPT | Mod: VID | Performed by: RADIOLOGY

## 2023-11-24 NOTE — PROGRESS NOTES
RADIATION ONCOLOGY FOLLOWUP RE-EVALUATION    Patient Name: Walter Reyes  Requesting Physician: Dr. 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    He was last at the time of completion of this left femur radiotherapy and presents today for followup and reevaluation regarding new areas of osseous concern.    Medical oncologist: Dr. Mcnair      Interval History: Since completing radiotherapy, he notes very little if any pain in the left femur area, reporting that it is 99% better and only occasionally symptomatic with prolonged weightbearing.      His present sites of pain involve:  (1) Right rib area paraspinally at the level of the scapula.  This radiates slightly to the left.  He has a separate area of pain in the anterior lower chest wall on the right as well, although this is more minimal.  (2) Left forehead above the eyebrow.  (3) Left hip.  Although all pain is intermittent and is presently well-controlled on fentanyl, he is intermittently having pain flares for which he is taking dexamethasone, last yesterday and revolving around the rib lesions.  Specifically regarding the forehead lesion, if he does not touch this area, the pain is controlled.     He met with Dr. Mcnair in medical oncology last on 2023.  At that time, he was having more pain in his chest, wrist, and back.  PSA was found with increase to 4.38 from previous 2.21 in 10/2023.  He underwent a biopsy of the right iliac crest to  confirm progression of Pluvicto.  However given symptomatic progression with PSA rise, his next dose of Pluvicto planned for this month was canceled.  He has plans to start his next line of systemic therapy on Monday, which is cabazitaexel and prednisone, delivered every 21 days.  He continues to receive Zometa and Lupron.        Medications:   Current Outpatient Medications:     atorvastatin (LIPITOR) 20 MG tablet, Take 60 mg by mouth daily , Disp: , Rfl:     calcium citrate-vitamin D (CITRACAL) 315-250 MG-UNIT TABS per tablet, Take 650 mg by mouth 2 times daily , Disp: , Rfl:     cycloSPORINE (RESTASIS) 0.05 % ophthalmic emulsion, Place 1 drop into both eyes 2 times daily , Disp: , Rfl:     dexAMETHasone (DECADRON) 4 MG tablet, Take 1 tablet (4 mg) by mouth daily, Disp: 7 tablet, Rfl: 2    fentaNYL (DURAGESIC) 12 mcg/hr 72 hr patch, Place 2 patches onto the skin every 72 hours Remove old fentanyl patch before placing new patch each time., Disp: , Rfl:     gabapentin (NEURONTIN) 300 MG capsule, TAKE ONE CAPSULE BY MOUTH TWICE DAILY AND 2 CAPSULES AT BEDTIME, Disp: 120 capsule, Rfl: 3    Glucosamine-Chondroit-Vit C-Mn (GLUCOSAMINE 1500 COMPLEX) CAPS, Take 1 capsule by mouth daily, Disp: , Rfl:     levothyroxine (SYNTHROID/LEVOTHROID) 112 MCG tablet, Take 112 mcg by mouth daily, Disp: , Rfl:     loratadine (CLARITIN) 10 MG tablet, , Disp: , Rfl:     Multiple Vitamins-Minerals (MULTIVITAMIN ADULT EXTRA C PO), Take 1 tablet by mouth every 24 hours, Disp: , Rfl:     Nutritional Supplements (SALMON OIL) CAPS, Take 2 capsules by mouth daily , Disp: , Rfl:     omeprazole (PRILOSEC) 20 MG DR capsule, Take 20 mg by mouth daily, Disp: , Rfl:     oxyCODONE (ROXICODONE) 5 MG tablet, Take 1-2 tablets (5-10 mg) by mouth every 3 hours as needed for severe pain, Disp: 90 tablet, Rfl: 0    prochlorperazine (COMPAZINE) 10 MG tablet, Take 1 tablet (10 mg) by mouth every 6 hours as needed for nausea or vomiting, Disp: 90 tablet, Rfl:  1    tamsulosin (FLOMAX) 0.4 MG capsule, Take 1 capsule (0.4 mg) by mouth daily, Disp: 30 capsule, Rfl: 3    triamterene-HCTZ (MAXZIDE-25) 37.5-25 MG tablet, Take 1 tablet by mouth daily, Disp: , Rfl:     naloxone (NARCAN) 4 MG/0.1ML nasal spray, Spray 1 spray (4 mg) into one nostril alternating nostrils as needed for opioid reversal every 2-3 minutes until assistance arrives (Patient not taking: Reported on 10/2/2023), Disp: 0.2 mL, Rfl: 1    Allergies:   Allergies   Allergen Reactions    Amlodipine Other (See Comments)     Leg swelling       Review of Systems: A complete 10 system review was negative except as noted in the HPI above.    Physical Examination:  KPS: 80  Pain Score: 2 out of 10 at the left forehead  General: Alert and in no acute distress.  HEENT: Normocephalic, atraumatic. No visible scleral icterus.  Neck: Apparent full range of motion.  CV: Appears well-perfused, with no visible cyanosis.  Lungs: Breathing easily on room air, with no difficulty completing full sentences  Extremities:  No visible edema of the upper extremities. Full range of motion at the shoulders and elbows.    Neuro: Alert and oriented; grossly nonfocal. Normal speech. Moving upper extremities equally.  Skin: No visible jaundice. No suspicious lesions of the visualized integuments.  Psych: Mood and affect are appropriate to given situation. Answers questions appropriately.      Data Review:    Labs:  Lab Results   Component Value Date    WBC 7.7 11/08/2023    HGB 10.9 (L) 11/08/2023    HCT 33.4 (L) 11/08/2023     11/08/2023    ALT 16 11/08/2023    AST 31 11/08/2023     11/08/2023    BUN 16.0 11/08/2023    CO2 22 11/08/2023    TSH 1.67 07/07/2021    PSA 4.38 11/08/2023    INR 1.01 11/21/2023    ALKPHOS 177 (H) 11/08/2023         Imaging:  PET PSMA, 9/26/2023:  IMPRESSION:  In this patient with metastatic castration resistant prostate  carcinoma currently on PLUVICTO, status post 3 cycles having worsening  symptoms:      1.  Mixed response to treatment, overall concerning for disease  progression given the presence of new lesions.   a) Increased radiotracer uptake in a left obturator lymph node, few  new osseous metastatic lesions, and progression of radiotracer  activity in some of the osseous metastatic lesions.  b) some of the osseous metastatic lesions demonstrate decreased  radiotracer uptake in comparison to PET/CT from 11/14/2022.   2. Decreased radiotracer uptake seen within the prostate gland  compared to prior exam.  3. Post operative changes of right sided nephrectomy.  4. Unchanged large gastric hiatus hernia.  5. Please refer to separate report of high resolution PET/CT of the  head and neck.    Specifically regarding BONES:   Multifocal areas of radiotracer uptake are again noted throughout the  axial and appendicular skeleton. Few of these are new,  few  demonstrate decreased radiotracer uptake compared to prior and few  have progressed as described below:     New lesions:  -Sclerotic right parietal bone with mean SUV  5.3 (4/14).  -Left frontal bone sclerotic lesion with mean SUV 1.7 (4/26).  -Left femoral shaft with cortical medullary sclerotic lesions  having  mean SUV  9.8 (4/360).  - Right femoral shaft mean SUV of 6 (series 4/374)       Few progressed since for example:  -Sclerotic lesion in the left glenoid and the mean SUV uptake 10.8,  previously 8.8 (4/104)  -Left iliac bone sclerotic lesion having mean SUV  10.05, previously  18.4, however, lesion has grown in size on the current scan and  extends to involve the iliac ala (4/265).       Few improved lesions with decreased uptake e.g:  -Sclerotic right frontal bone lesion without significant uptake on  current scan (5/26).  -Right humeral neck sclerotic lesion with mean SUV 0.9, previously 5.7  (4/87).  -C7 sclerotic lesion with SUV mean 8.0, previously 23.0 (4/97).  -Expansile sclerotic lesion involving the right posterior and lateral  5th  rib with SUV  mean 11.0, previously 54.0 (4/128).  -Sclerotic lesions involving L5-S1 vertebra and sacral ala having SUV  mean of 8.1, previously 26.6 (4/245).          CT CAP, 11/10/1/2023:  IMPRESSION:   1. Findings consistent with disease progression.  2. New subcentimeter right femoral neck and posterior right 12th rib  lesions.  3. Increased sclerotic involvement of T4 and T5.  4. Otherwise unchanged bony involvement of axial and appendicular  skeleton.  5. No pathologic fracture.  6. Right nephrectomy without local recurrence.          Impression and plan: Mr. 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  Unfortunately, he has had increased pain primarily along the right posterior upper chest wall with imaging showing an area of metastatic osseous involvement of the ribs and adjacent increasing sclerosis of T4 and T5 vertebral bodies, with some extension into the left ribs at this level as well.  PSA was also noted to increase from 2.21 in 10/2023 to 4.38 this month.  Given symptomatic progression with PSA rise, his next dose of Pluvicto planned for this month was canceled.  He has plans to start his next line of systemic therapy on 11/27/2023, which is cabazitaexel and prednisone, delivered every 21 days under care of Dr. Mcnair.  He continues to receive Zometa and Lupron.    We recommended palliative radiation to the bilateral posterior chest wall at an area of osseous involvement of the ribs and adjacent T4 and 5 spine.  We explained the palliative, symptom-focused intention of such therapy.  We noted that the goal  was to reduce pain attributable to cancer activity at the bone site.     Regarding potential treatment of the left forehead or left hip/iliac symptomatic areas, since these are not his dominant areas of pain at this time, we would defer consideration of treatment of these locations until after he starts his new chemotherapy.  These areas would potentially be amenable to palliative radiation, although attention would need to be taken to avoid overlap with his prior twice treated sacral area of concern for the left iliac metastasis.      We discussed the risks (short and long term as listed on consent), benefits, and alternatives to radiotherapy.  The risks of radiation include but are not limited to: pain flare, skin irritation, hair loss in the area treated, fibrosis, joint stiffness, edema, wound healing difficulties,  increased risk of fracture, damage to nerves, and tumors caused by radiation.  Informed consent was completed by telephone visit.    He expressed clear understanding of the palliative intent of therapy. Mr. Reyes is in agreement with this plan and will be scheduled for CT-based simulation for radiation planning.     Plan:  1) Palliative radiotherapy to the bilateral posterior chest wall at an area of osseous involvement of the ribs and adjacent T4 and 5 spine, to be delivered to 2000 cGy in 5 fractions.  Despite the palliative intent, IMRT may be required given the extensive treatment field and in order to minimize dose to normal adjacent structures including the lungs, heart, and esophagus.  2) Simulation will be performed in supine, arms at side, [facility] bag, scheduled for 11/29/2023.  We will plan to deliver this treatment before his next dose of chemotherapy, due 12/18/2023.      We appreciate the opportunity to participate in Mr. Reyes's care. He was encouraged to contact me with questions or concerns should they arise.    I personally reviewed the available CT and PSMA PET  images. Laboratory data and pathology as noted above was reviewed. I reviewed previous medical records, which are summarized in the HPI. A total of 40 minutes were spent (including face to face time) during this visit, and more than 50% of the time was spent in counseling and coordination of care.     Lexis Albert MD MPH PhD    Department of Radiation Oncology

## 2023-11-24 NOTE — LETTER
2023         RE: Walter Reyes  [id number] [facility] [id number]      RADIATION ONCOLOGY FOLLOWUP RE-EVALUATION    Patient Name: Walter Reyes  Requesting Physician: Dr. 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    He was last at the time of completion of this left femur radiotherapy and presents today for followup and reevaluation regarding new areas of osseous concern.    Medical oncologist: Dr. Mcnair      Interval History: Since completing radiotherapy, he notes very little if any pain in the left femur area, reporting that it is 99% better and only occasionally symptomatic with prolonged weightbearing.      His present sites of pain involve:  (1) Right rib area paraspinally at the level of the scapula.  This radiates slightly to the left.  He has a separate area of pain in the anterior lower chest wall on the right as well, although this is more minimal.  (2) Left forehead above the eyebrow.  (3) Left hip.  Although all pain is intermittent and is presently well-controlled on fentanyl, he is intermittently having pain flares for which he is taking dexamethasone, last yesterday and revolving around the rib lesions.  Specifically regarding the forehead lesion, if he does not touch this area, the pain is controlled.     He met with Dr. Mcnair in medical oncology last on 2023.  At that time, he was having more pain in his chest, wrist, and back.  PSA was found with  increase to 4.38 from previous 2.21 in 10/2023.  He underwent a biopsy of the right iliac crest to confirm progression of Pluvicto.  However given symptomatic progression with PSA rise, his next dose of Pluvicto planned for this month was canceled.  He has plans to start his next line of systemic therapy on Monday, which is cabazitaexel and prednisone, delivered every 21 days.  He continues to receive Zometa and Lupron.        Medications:   Current Outpatient Medications:      atorvastatin (LIPITOR) 20 MG tablet, Take 60 mg by mouth daily , Disp: , Rfl:      calcium citrate-vitamin D (CITRACAL) 315-250 MG-UNIT TABS per tablet, Take 650 mg by mouth 2 times daily , Disp: , Rfl:      cycloSPORINE (RESTASIS) 0.05 % ophthalmic emulsion, Place 1 drop into both eyes 2 times daily , Disp: , Rfl:      dexAMETHasone (DECADRON) 4 MG tablet, Take 1 tablet (4 mg) by mouth daily, Disp: 7 tablet, Rfl: 2     fentaNYL (DURAGESIC) 12 mcg/hr 72 hr patch, Place 2 patches onto the skin every 72 hours Remove old fentanyl patch before placing new patch each time., Disp: , Rfl:      gabapentin (NEURONTIN) 300 MG capsule, TAKE ONE CAPSULE BY MOUTH TWICE DAILY AND 2 CAPSULES AT BEDTIME, Disp: 120 capsule, Rfl: 3     Glucosamine-Chondroit-Vit C-Mn (GLUCOSAMINE 1500 COMPLEX) CAPS, Take 1 capsule by mouth daily, Disp: , Rfl:      levothyroxine (SYNTHROID/LEVOTHROID) 112 MCG tablet, Take 112 mcg by mouth daily, Disp: , Rfl:      loratadine (CLARITIN) 10 MG tablet, , Disp: , Rfl:      Multiple Vitamins-Minerals (MULTIVITAMIN ADULT EXTRA C PO), Take 1 tablet by mouth every 24 hours, Disp: , Rfl:      Nutritional Supplements (SALMON OIL) CAPS, Take 2 capsules by mouth daily , Disp: , Rfl:      omeprazole (PRILOSEC) 20 MG DR capsule, Take 20 mg by mouth daily, Disp: , Rfl:      oxyCODONE (ROXICODONE) 5 MG tablet, Take 1-2 tablets (5-10 mg) by mouth every 3 hours as needed for severe pain, Disp: 90 tablet, Rfl: 0     prochlorperazine (COMPAZINE) 10  MG tablet, Take 1 tablet (10 mg) by mouth every 6 hours as needed for nausea or vomiting, Disp: 90 tablet, Rfl: 1     tamsulosin (FLOMAX) 0.4 MG capsule, Take 1 capsule (0.4 mg) by mouth daily, Disp: 30 capsule, Rfl: 3     triamterene-HCTZ (MAXZIDE-25) 37.5-25 MG tablet, Take 1 tablet by mouth daily, Disp: , Rfl:      naloxone (NARCAN) 4 MG/0.1ML nasal spray, Spray 1 spray (4 mg) into one nostril alternating nostrils as needed for opioid reversal every 2-3 minutes until assistance arrives (Patient not taking: Reported on 10/2/2023), Disp: 0.2 mL, Rfl: 1    Allergies:   Allergies   Allergen Reactions     Amlodipine Other (See Comments)     Leg swelling       Review of Systems: A complete 10 system review was negative except as noted in the HPI above.    Physical Examination:  KPS: 80  Pain Score: 2 out of 10 at the left forehead  General: Alert and in no acute distress.  HEENT: Normocephalic, atraumatic. No visible scleral icterus.  Neck: Apparent full range of motion.  CV: Appears well-perfused, with no visible cyanosis.  Lungs: Breathing easily on room air, with no difficulty completing full sentences  Extremities:  No visible edema of the upper extremities. Full range of motion at the shoulders and elbows.    Neuro: Alert and oriented; grossly nonfocal. Normal speech. Moving upper extremities equally.  Skin: No visible jaundice. No suspicious lesions of the visualized integuments.  Psych: Mood and affect are appropriate to given situation. Answers questions appropriately.      Data Review:    Labs:  Lab Results   Component Value Date    WBC 7.7 11/08/2023    HGB 10.9 (L) 11/08/2023    HCT 33.4 (L) 11/08/2023     11/08/2023    ALT 16 11/08/2023    AST 31 11/08/2023     11/08/2023    BUN 16.0 11/08/2023    CO2 22 11/08/2023    TSH 1.67 07/07/2021    PSA 4.38 11/08/2023    INR 1.01 11/21/2023    ALKPHOS 177 (H) 11/08/2023         Imaging:  PET PSMA, 9/26/2023:  IMPRESSION:  In this patient with  metastatic castration resistant prostate  carcinoma currently on PLUVICTO, status post 3 cycles having worsening  symptoms:     1.  Mixed response to treatment, overall concerning for disease  progression given the presence of new lesions.   a) Increased radiotracer uptake in a left obturator lymph node, few  new osseous metastatic lesions, and progression of radiotracer  activity in some of the osseous metastatic lesions.  b) some of the osseous metastatic lesions demonstrate decreased  radiotracer uptake in comparison to PET/CT from 11/14/2022.   2. Decreased radiotracer uptake seen within the prostate gland  compared to prior exam.  3. Post operative changes of right sided nephrectomy.  4. Unchanged large gastric hiatus hernia.  5. Please refer to separate report of high resolution PET/CT of the  head and neck.    Specifically regarding BONES:   Multifocal areas of radiotracer uptake are again noted throughout the  axial and appendicular skeleton. Few of these are new,  few  demonstrate decreased radiotracer uptake compared to prior and few  have progressed as described below:     New lesions:  -Sclerotic right parietal bone with mean SUV  5.3 (4/14).  -Left frontal bone sclerotic lesion with mean SUV 1.7 (4/26).  -Left femoral shaft with cortical medullary sclerotic lesions  having  mean SUV  9.8 (4/360).  - Right femoral shaft mean SUV of 6 (series 4/374)       Few progressed since for example:  -Sclerotic lesion in the left glenoid and the mean SUV uptake 10.8,  previously 8.8 (4/104)  -Left iliac bone sclerotic lesion having mean SUV  10.05, previously  18.4, however, lesion has grown in size on the current scan and  extends to involve the iliac ala (4/265).       Few improved lesions with decreased uptake e.g:  -Sclerotic right frontal bone lesion without significant uptake on  current scan (5/26).  -Right humeral neck sclerotic lesion with mean SUV 0.9, previously 5.7  (4/87).  -C7 sclerotic lesion with SUV  mean 8.0, previously 23.0 (4/97).  -Expansile sclerotic lesion involving the right posterior and lateral  5th  rib with SUV mean 11.0, previously 54.0 (4/128).  -Sclerotic lesions involving L5-S1 vertebra and sacral ala having SUV  mean of 8.1, previously 26.6 (4/245).          CT CAP, 11/10/1/2023:  IMPRESSION:   1. Findings consistent with disease progression.  2. New subcentimeter right femoral neck and posterior right 12th rib  lesions.  3. Increased sclerotic involvement of T4 and T5.  4. Otherwise unchanged bony involvement of axial and appendicular  skeleton.  5. No pathologic fracture.  6. Right nephrectomy without local recurrence.          Impression and plan: Mr. 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  Unfortunately, he has had increased pain primarily along the right posterior upper chest wall with imaging showing an area of metastatic osseous involvement of the ribs and adjacent increasing sclerosis of T4 and T5 vertebral bodies, with some extension into the left ribs at this level as well.  PSA was also noted to increase from 2.21 in 10/2023 to 4.38 this month.  Given symptomatic progression with PSA rise, his next dose of Pluvicto planned for this month was canceled.  He has plans to start his next line of systemic therapy on 11/27/2023, which is cabazitaexel and prednisone, delivered every 21 days under care of Dr. Mcnair.  He continues to receive Zometa and Lupron.    We recommended palliative radiation to the bilateral posterior chest wall at an area of osseous involvement of the ribs  and adjacent T4 and 5 spine.  We explained the palliative, symptom-focused intention of such therapy.  We noted that the goal was to reduce pain attributable to cancer activity at the bone site.     Regarding potential treatment of the left forehead or left hip/iliac symptomatic areas, since these are not his dominant areas of pain at this time, we would defer consideration of treatment of these locations until after he starts his new chemotherapy.  These areas would potentially be amenable to palliative radiation, although attention would need to be taken to avoid overlap with his prior twice treated sacral area of concern for the left iliac metastasis.      We discussed the risks (short and long term as listed on consent), benefits, and alternatives to radiotherapy.  The risks of radiation include but are not limited to: pain flare, skin irritation, hair loss in the area treated, fibrosis, joint stiffness, edema, wound healing difficulties,  increased risk of fracture, damage to nerves, and tumors caused by radiation.  Informed consent was completed by telephone visit.    He expressed clear understanding of the palliative intent of therapy. Mr. Reyes is in agreement with this plan and will be scheduled for CT-based simulation for radiation planning.     Plan:  1) Palliative radiotherapy to the bilateral posterior chest wall at an area of osseous involvement of the ribs and adjacent T4 and 5 spine, to be delivered to 2000 cGy in 5 fractions.  Despite the palliative intent, IMRT may be required given the extensive treatment field and in order to minimize dose to normal adjacent structures including the lungs, heart, and esophagus.  2) Simulation will be performed in supine, arms at side, vaklok bag, scheduled for 11/29/2023.  We will plan to deliver this treatment before his next dose of chemotherapy, due 12/18/2023.      We appreciate the opportunity to participate in Mr. Reyes's care. He was  "encouraged to contact me with questions or concerns should they arise.    I personally reviewed the available CT and PSMA PET images. Laboratory data and pathology as noted above was reviewed. I reviewed previous medical records, which are summarized in the HPI. A total of 40 minutes were spent (including face to face time) during this visit, and more than 50% of the time was spent in counseling and coordination of care.     Lexis Albert MD MPH PhD    Department of Radiation Oncology          FOLLOW-UP VISIT    Patient Name: Walter Reyes      : 1960     Age: 63 year old        ______________________________________________________________________________     Chief Complaint   Patient presents with     Radiation Therapy     Follow up to radiation therapy     There were no vitals taken for this visit. (Video visit)    Date Radiation Completed: Prostate Cancer: C7 3000 cGy completed 21: Sacrum 2500 cGy completed 22: Sacrum 700 cGy completed 23:Left femur 2000 cGy completed 10/10/23    Pain  Current history of pain associated with this visit:   Intensity: 210  Current: dull  Location: forehead   Treatment: on two 12 mcg/hr fentanyl patches, now seems to be covering pain    Labs  Other Labs: Yes: 23    Imaging  CT: CAP 11/10/23    Residual Radiation side effect: \"99% better\",  with pressure; did take dexamethasone Thursday morning     Last big pain flare around rib. Next round of chemo .     Virtual Visit Details    Type of service:  Video Visit     Originating Location (pt. Location): Home  Distant Location (provider location):  On-site  Platform used for Video Visit: Conchis Albert  "

## 2023-11-24 NOTE — PROGRESS NOTES
"FOLLOW-UP VISIT    Patient Name: Walter Reyes      : 1960     Age: 63 year old        ______________________________________________________________________________     Chief Complaint   Patient presents with    Radiation Therapy     Follow up to radiation therapy     There were no vitals taken for this visit. (Video visit)    Date Radiation Completed: Prostate Cancer: C7 3000 cGy completed 21: Sacrum 2500 cGy completed 22: Sacrum 700 cGy completed 23:Left femur 2000 cGy completed 10/10/23    Pain  Current history of pain associated with this visit:   Intensity: 2/10  Current: dull  Location: forehead   Treatment: on two 12 mcg/hr fentanyl patches, now seems to be covering pain    Labs  Other Labs: Yes: 23    Imaging  CT: CAP 11/10/23    Residual Radiation side effect: \"99% better\",  with pressure; did take dexamethasone Thursday morning     Last big pain flare around rib. Next round of chemo .     Virtual Visit Details    Type of service:  Video Visit     Originating Location (pt. Location): Home  Distant Location (provider location):  On-site  Platform used for Video Visit: Conchis     "

## 2023-11-26 NOTE — PROGRESS NOTES
[facility] Oncology Followup  Oncologist: Tabby  Nov 27, 2023    REASON FOR VISIT: Follow-up for metastatic castration-resistant prostate cancer.      INTERVAL HISTORY: Feeling about the same today.  Opted to get radiation to ribs after talking with Dr. Albert.  This will take some planning due to the location.  Appetite and energy are about the same.  Pain is overall the same.  Biopsy was done at [facility].      ONCOLOGY HISTORY: Mr. Walter Reyes is a 63 year old gentleman with a metastatic castration-sensitive prostate cancer and incidentally diagnosed stage 3 clear cell RCC (s/p radical R nephrectomy on 3/19/19) which is now 4.5+ years post-therapy without evidence of recurrence. His oncologic history is detailed below.     1/6/21  PSA = 0.29  3/31/21 PSA = 0.44  7/7/21  PSA = 0.47  11/2021 PSA = 1.05  -- Start XTANDI  12/16/21 PSA =0.22  2/11/22 PSA= 0.50  3/22/22 PSA=0.72  5/5/2022 PSA=1.79  7/11/2022 PSA=2.16 --Start cycle 1 docetaxel   8/22/22 PSA = 1.36  9/12/22 PSA = 1.09  10/24/22 Cycle #6 of Docetaxel. End of treatment  1/9/23  PSA =0.66  3/20/23  PSA=1.54  4/21/23 PSA = 1.81  T=15  5/22/23 C1 Pluvicto   06/07/23          PSA = 1.42  7/18/23 PSA=1.75, C2 Pluvicto   8/28/23 Transfer of care initial visit for Tabby.  PSA 2.06.  C3 Pluvicto scheduled for 8/30.  Proceed with dose as planned.  MR DELORIS femur, ortho referral, PSMA PET ordered for prior to follow-up.    10/2/23 Palliative RT to L femur metastasis complete.  PSMA PET with mixed response.  PSA 2.21.  RT to L femur  pending.  Continue with Dose #4 of Pluvicto despite mixed response to therapy.  Dex course for possible pain   Flare with RT.    11/8/23  Follow-up prior to Dose #5 Pluvicto.  More pain in chest/ribs/back. PSA 4.38.  Testosterone=3.  Rad onc referral for ribs.  Biopsy progressive lesion for     Progression on Pluvicto.  Cabazitaxel scheduled for follow-up appointment.  Pending Tempus testing on biopsy sample versus  "peripheral blood.   11/27/23 Bx completed from R iliac crest but unrevealing for either PCa or RCC.  Start cabazitaxel C1D1.  PSA 2.81.      ONCOLOGIC HISTORY:  1. 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 Naylor at [facility]. Prior PSA were 1.4 on 8/16/17, 2.4 on 6/28/16, 2.9 on 6/28/16, and as low as 0.4 on 3/26/2003.   - 1/08/2019: Consultation with Sarah Wilson CNP in Urology clinic. Repeat PSA 9.6.  - 1/16/2019: MRI prostate with contrast - \"This examination is characterized as PIRADS 5- very high probability. Clinically significant cancer is highly likely to be present. There is a large, invasive mass arising from the right peripheral zone and extending into the neurovascular bundle, seminal vesicle, and along the anterolateral right mesorectal fascia. Metastatic right external iliac lymph node. Metastatic lesion in the posterior/superior right acetabulum.\"  - 1/25/2019: CT abdomen and pelvis with IV contrast - \"1. Heterogeneous enhancing mass posteriorly in the upper pole of the right kidney measures 4.5 x 5.8 x 5.7 cm (AP by transverse by craniocaudal). It has a small nodular component extending posterior medially which abuts the right psoas muscle. This nodular extension measures 2.1 x 2.1 cm. Minimal stranding about the mass. No definite thrombus within the right renal vein. This renal mass is compatible with a renal cell carcinoma. A paraaortic lymph node situated immediately posterior to the left renal vein measures 1.1 cm in short axis, suspicious for metastasis. 2. A 2 cm right external iliac lymph node is also suspicious for metastases. 3. Multiple sclerotic osseous lesions suspicious for metastases. These include 0.8 and 0.6 cm sclerotic lesions laterally in the right iliac wing (images 53 and 62 respectively). " "Ill-defined groundglass density laterally in the right acetabulum measuring 1.7 x 1.2 cm corresponds with the lesion identified on MRI. A 0.4 cm groundglass density in the left acetabulum. Sclerotic lesion in the left femoral neck measures 0.9 x 1.6 cm (image 81). Sclerotic metastases would be more compatible with prostate metastases. 4. A 3 subcentimeter hepatic lesions are indeterminate. Metastases would be a consideration. These can be further characterized with liver MRI.\"  - 1/25/2019: NM bone scan - \"There is focal bony uptake in the left femoral neck, right acetabulum, right of midline at the S1 or L5 level of the spine, within multiple bilateral ribs, in the C7 or T1 level of the spine, and anteriorly within the skull. Findings are suspicious for metastases.\"  - 1/31/19: CT chest with contrast - \" No suspicious nodules in the chest.  Stable appearance of a 5.8 cm right renal mass concerning for renal carcinoma until proven otherwise.  Stable indeterminant subcentimeter hypodensities in the liver.  Multifocal osteoblastic metastasis including 2.5 cm lesion in the right fifth rib posteriorly and a 1.6 cm lesion in the right third rib posteriorly. No lytic lesions identified.\"  - 2/6/19: CT-guided right sclerotic fifth rib lesion biopsy - \"Metastatic carcinoma, consistent with prostate primary.  Immunohistochemical stains performed show the metastatic carcinoma stains positive for NKX3.1 (prostate marker), negative for STACIE 3 and PAX8, which supports the above diagnosis.\"  - 2/15/19: Started Casodex 50mg every day and consented for the biobanking protocol. 3/18/19 - stopped Casodex.  - 2/19/19: Case discussed in tumor board - recommendation for nephectomy for suspected malignant right renal mass.   - 2/26/19: Started Lupron 22.5mg every 3 months.   - 5/8/19: Started Docetaxel 75mg/m2 IV every 3 weeks. 06/18/19 - cycle 3, 7/10/19 - cycle 4, 07/31/19 - cycle 5, 08/21/19 - cycle 6.   - 10/2/19: Restaging CT CAP " "and NM Bone Scan showed improved osseous disease, no evidence of recurrent or new metastatic disease.  - 1/5/20: Restaging CT CAP and NM Bone Scan showed stable osseous disease, no evidence of recurrent or new metastatic disease.  - 4/6/20: NM bone scan with slightly improved uptake in known skeletal mets. CT C/A/P with contrast with stable osseous mets, no yusuf enlargement, no new visceral mets etc.  - 04/08/20: Zometa every 3 months.  - 10/5/20: CT C/A/P with contrast and NM bone scan - SD.   - 1/4/21: CT C/A/P with contrast and NM bone scan - SD but slight increase in right 5th rib tracer uptake and in posterior L5 sclerosis.   - 03/29/21: CT C/A/P with contrast - single new right sacral 8mm bone lesion (suspicious), other bone mets stable. No visceral/yusuf disease. Nephrectomy site without recurrence. NM bone scan - SD but \"increased uptake associated with the prior lesions of the lower  cervical spine, posterior right fourth rib and right L5 posterior arch elements. Otherwise unchanged uptake associated with the proximal left femur and left anterior fourth rib. Similar uptake of the left halux, possibly secondary to degenerative osteoarthritis or gout.\"      Radiation history:   C7         3,000 cGy       06 X      8/05/2021        8/18/2021        13       10  2 Sacrum          2,500 cGy       18 X      4/12/2022        4/18/2022         6               Sacrum retreat               700 cGy        18 X      2/28/2023        2/28/2023    L femur Pending for October 2023.     2. Stage III ([id number]), grade 3 of 4, clear-cell RCC of right kidney:  - Incidentally diagnosed as above.   - Underwent curative-intent robotic right radical nephrectomy with Dr. Wesley Cleveland on 3/19/19. No tumor spillage per op report.   - Path showed: \"Histologic Type: Clear cell renal cell carcinoma; Sarcomatoid Features: Not identified; Histologic Grade: Nucleolar grade 3 (WHO/ISUP). Extent Tumor Size: 4.3 cm. Microscopic Tumor " "Extension: Tumor extension into renal sinus (in vascular structures). Margins: Negative. Tumor Necrosis: Present; focal. Lymph-Vascular Invasion: Not identified.  Pathologic Staging (pTNM) Primary Tumor (pT): pT3a: Tumor extends into renal vein branches/renal sinus. Regional Lymph Nodes (pN): pNX. Number of Lymph Nodes Examined: 0 Distant Metastasis (pM): pM N/A.\"  - Restaging scans as above.  No current concerns for recurrence.      PAST MEDICAL HISTORY:  Past Medical History:   Diagnosis Date    Cancer of kidney, right (H)     Complication of anesthesia     slow wakeup     Malignant neoplasm of prostate metastatic to bone (H) 2/14/2019    Thyroid disease      CURRENT MEDICATIONS:   Current Outpatient Medications:     ondansetron (ZOFRAN) 8 MG tablet, Take 1 tablet (8 mg) by mouth every 8 hours as needed for nausea, Disp: 30 tablet, Rfl: 4    atorvastatin (LIPITOR) 20 MG tablet, Take 60 mg by mouth daily , Disp: , Rfl:     buprenorphine (BUTRANS) 20 MCG/HR WK patch, Place 1 patch onto the skin once a week, Disp: , Rfl:     calcium citrate-vitamin D (CITRACAL) 315-250 MG-UNIT TABS per tablet, Take 650 mg by mouth 2 times daily , Disp: , Rfl:     cycloSPORINE (RESTASIS) 0.05 % ophthalmic emulsion, Place 1 drop into both eyes 2 times daily , Disp: , Rfl:     dexAMETHasone (DECADRON) 4 MG tablet, Take 1 tablet (4 mg) by mouth daily, Disp: 7 tablet, Rfl: 2    fentaNYL (DURAGESIC) 12 mcg/hr 72 hr patch, Place 2 patches onto the skin every 72 hours Remove old fentanyl patch before placing new patch each time., Disp: , Rfl:     gabapentin (NEURONTIN) 300 MG capsule, TAKE ONE CAPSULE BY MOUTH TWICE DAILY AND 2 CAPSULES AT BEDTIME, Disp: 120 capsule, Rfl: 3    Glucosamine-Chondroit-Vit C-Mn (GLUCOSAMINE 1500 COMPLEX) CAPS, Take 1 capsule by mouth daily, Disp: , Rfl:     levothyroxine (SYNTHROID/LEVOTHROID) 112 MCG tablet, Take 112 mcg by mouth daily, Disp: , Rfl:     loratadine (CLARITIN) 10 MG tablet, Take 10 mg by mouth " daily, Disp: , Rfl:     Multiple Vitamins-Minerals (MULTIVITAMIN ADULT EXTRA C PO), Take 1 tablet by mouth every 24 hours, Disp: , Rfl:     naloxone (NARCAN) 4 MG/0.1ML nasal spray, Spray 1 spray (4 mg) into one nostril alternating nostrils as needed for opioid reversal every 2-3 minutes until assistance arrives, Disp: 0.2 mL, Rfl: 1    Nutritional Supplements (SALMON OIL) CAPS, Take 2 capsules by mouth daily , Disp: , Rfl:     omeprazole (PRILOSEC) 20 MG DR capsule, Take 20 mg by mouth daily, Disp: , Rfl:     oxyCODONE (ROXICODONE) 5 MG tablet, Take 1-2 tablets (5-10 mg) by mouth every 3 hours as needed for severe pain, Disp: 90 tablet, Rfl: 0    predniSONE (DELTASONE) 10 MG tablet, Take 1 tablet (10 mg) by mouth daily for 21 days, Disp: 21 tablet, Rfl: 0    prochlorperazine (COMPAZINE) 10 MG tablet, Take 1 tablet (10 mg) by mouth every 6 hours as needed for nausea or vomiting, Disp: 30 tablet, Rfl: 2    prochlorperazine (COMPAZINE) 10 MG tablet, Take 1 tablet (10 mg) by mouth every 6 hours as needed for nausea or vomiting, Disp: 90 tablet, Rfl: 1    tamsulosin (FLOMAX) 0.4 MG capsule, Take 1 capsule (0.4 mg) by mouth daily, Disp: 30 capsule, Rfl: 3    triamterene-HCTZ (MAXZIDE-25) 37.5-25 MG tablet, Take 1 tablet by mouth daily, Disp: , Rfl:   No current facility-administered medications for this visit.    Physical Exam:   BP (!) 143/80 (BP Location: Right arm, Patient Position: Sitting, Cuff Size: Adult Large)   Pulse 58   Temp 98.1  F (36.7  C) (Oral)   Resp 18   Wt 117.9 kg (259 lb 14.4 oz)   SpO2 98%   BMI 36.25 kg/m    General: Patient appears well in no acute distress.   Skin: No visualized rash or lesions on visualized skin  Eyes: EOMI, no erythema, sclera icterus or discharge noted  Resp: Appears to be breathing comfortably without accessory muscle usage, speaking in full sentences, no cough  MSK: Appears to have normal range of motion based on visualized movements  Neurologic: No apparent tremors,  "facial movements symmetric  Psych: affect bright, alert and oriented    ECOG PS 1.    LABORATORY DATA:  Most Recent 3 CBC's:  Recent Labs   Lab Test 11/27/23  1207 11/08/23  0856 10/10/23  1336   WBC 5.9 7.7 7.3   HGB 11.0* 10.9* 12.2*   MCV 89 88 91    226 249   ANEUTAUTO 4.0 6.4 5.0     Most Recent 3 BMP's:  Recent Labs   Lab Test 11/27/23  1207 11/08/23  0856 10/10/23  1336    139 139   POTASSIUM 3.6 4.1 3.6   CHLORIDE 107 107 103   CO2 24 22 24   BUN 17.6 16.0 23.1*   CR 1.02 0.86 0.99   ANIONGAP 10 10 12   BETHANY 8.8 9.2 9.0   * 118* 118*   PROTTOTAL 6.7 7.1 6.5   ALBUMIN 3.8 4.0 3.9    Most Recent 3 LFT's:  Recent Labs   Lab Test 11/27/23  1207 11/08/23  0856 10/10/23  1336   AST 22 31 19   ALT 15 16 23   ALKPHOS 246* 177* 218*   BILITOTAL 0.2 0.2 0.3    Most Recent 2 TSH and T4:  Recent Labs   Lab Test 07/07/21  0834 03/31/21  0824   TSH 1.67 1.63       I reviewed the above labs today.    PSA trend, see oncology history.     No new imaging studies at time of encounter.      R iliac crest biopsy from 11/21/23 without evidence for malignancy.      ASSESSMENT & PLAN: Mr. Reyes is a delightful 63 year old gentleman with metastatic castration sensitive prostate adenocarcinoma as well as an incidentally diagnosed stage III clear-cell renal cell carcinoma of the right kidney (s/p radical R nephrectomy 3/19/19), who is here for a follow-up visit and initiation of docetafor now metastatic castration-resistant prostate cancer.     1. Metastatic castration-resistant prostate cancer, with involvement of the bones and RPLN:   - Patient met the criteria for CHAARTED \"high-volume\" metastatic hormone-sensitive prostate cancer. He opted for docetaxel in combination with ADT (per JOSEFA, GETUG-AFU15, STAMPEDE). He was subsequently treated with docetaxel x6 doses in the [facility] setting and enzalutamide.  He initiated Pluvicto in May 2023 with an initial slight decline in PSA, but this began to rise just " prior to Cycle 3.  Given his XR evidence of progression in L femur and PSA rise, we re-evaluated his progression with PSMA PET scan in September 2023, with note of mixed response.  Reconsented for  [employer] on 8/28/23.  Due for 3 month collection at end of November 2023.    -Proceed with pluvicto dose #4.    -Zometa next due 1/24/2024 along with Lupron.   -Palliative to manage further opiate refills.   -Dexamethasone burst for RT and one on hand if needed for pain flares.   Let Oncology team know if this is used.  Refills sent 11/9/23.    -Canceled Pluvicto Dose for November due to symptomatic progression with PSA rise.    -PSA is labile  -Cabazitaxel to start 11/27/23 given progression on Pluvicto.  Biopsy results from 11/21 are pending for evaluation of NEPC vs small cell given progression without a significant rise in PSA.  Will also send peripheral blood Tempus for evaluation of targetable mutations given the negative result from the 11/21/23 bone biopsy.      2. Bone metastases:   - Noted to have osseous metastatic disease at the time of diagnosis. S/p radiation to C spine and sacrum (Re-irradiation to sacrum).   - worsening radiculopathy down starting in the low back and radiating down the left leg in June. MRI read was without new/acute abnormalities  - Pain continues to fluctuate but overall well controlled/managable   - pain mgmt per palliative care, on butrans patch now with addition of dilaudid.    - Encouraged to continue calcium and vitamin D supplementation; continue Zometa 4mg IV every ~3 months. Due next in November 2023.   - RT to L femur 10/2023.  Pending RT for R sided ribs 12/2023 with Dr. Albert between C1 and C2 of cabazitaxel.  - Added to trial list for PYN901, [id number].       3. Stage 3, UISS-high risk ccRCC: s/p R nephrectomy   - Nephrectomy 3/19/19 and noted incidentally.  He is now 4.5 years post-op without evidence for recurrence.   - At this point, there is a minimal risk of  recurrence of his RCC given the time since surgery without the use of adjuvant immunotherapy. There is no suspicious adenopathy or other features on his most recent imaging studies.    - Will continue to monitor with imaging planned for prostate cancer.      PLAN:   1. C1D1 Cabazitaxel today.   2. Biopsy unrevealing for malignancy.    3. Will request Tempus blood circulating tumor DNA evaluation for targetable mutations.      A total of 40 minutes were spent on this patient on the day of the encounter, of which more than 50% of this time was used for counseling and coordination of care.  The patient and his wife were given the opportunity to ask multiple questions today, all of which were answered to their satisfaction.     Omar Mcnair MD, PhD   of Medicine   Oncology/BMT/Cellular Therapies

## 2023-11-27 ENCOUNTER — PATIENT OUTREACH (OUTPATIENT)
Dept: ONCOLOGY | Facility: CLINIC | Age: 63
End: 2023-11-27

## 2023-11-27 ENCOUNTER — INFUSION THERAPY VISIT (OUTPATIENT)
Dept: ONCOLOGY | Facility: CLINIC | Age: 63
End: 2023-11-27
Attending: INTERNAL MEDICINE
Payer: COMMERCIAL

## 2023-11-27 ENCOUNTER — ONCOLOGY VISIT (OUTPATIENT)
Dept: ONCOLOGY | Facility: CLINIC | Age: 63
End: 2023-11-27
Attending: STUDENT IN AN ORGANIZED HEALTH CARE EDUCATION/TRAINING PROGRAM
Payer: COMMERCIAL

## 2023-11-27 ENCOUNTER — APPOINTMENT (OUTPATIENT)
Dept: LAB | Facility: CLINIC | Age: 63
End: 2023-11-27
Attending: STUDENT IN AN ORGANIZED HEALTH CARE EDUCATION/TRAINING PROGRAM
Payer: COMMERCIAL

## 2023-11-27 VITALS
OXYGEN SATURATION: 98 % | TEMPERATURE: 98.1 F | HEART RATE: 58 BPM | WEIGHT: 259.9 LBS | RESPIRATION RATE: 18 BRPM | DIASTOLIC BLOOD PRESSURE: 80 MMHG | SYSTOLIC BLOOD PRESSURE: 143 MMHG | BODY MASS INDEX: 36.25 KG/M2

## 2023-11-27 VITALS — HEIGHT: 71 IN | BODY MASS INDEX: 36.25 KG/M2

## 2023-11-27 DIAGNOSIS — C61 MALIGNANT NEOPLASM OF PROSTATE METASTATIC TO BONE (H): Primary | ICD-10-CM

## 2023-11-27 DIAGNOSIS — D63.0 ANEMIA IN NEOPLASTIC DISEASE: ICD-10-CM

## 2023-11-27 DIAGNOSIS — G62.0 CHEMOTHERAPY-INDUCED PERIPHERAL NEUROPATHY (H): ICD-10-CM

## 2023-11-27 DIAGNOSIS — C79.51 MALIGNANT NEOPLASM OF PROSTATE METASTATIC TO BONE (H): ICD-10-CM

## 2023-11-27 DIAGNOSIS — C79.51 MALIGNANT NEOPLASM OF PROSTATE METASTATIC TO BONE (H): Primary | ICD-10-CM

## 2023-11-27 DIAGNOSIS — C61 MALIGNANT NEOPLASM OF PROSTATE METASTATIC TO BONE (H): ICD-10-CM

## 2023-11-27 DIAGNOSIS — T45.1X5A CHEMOTHERAPY-INDUCED PERIPHERAL NEUROPATHY (H): ICD-10-CM

## 2023-11-27 DIAGNOSIS — C61 HORMONE RESISTANT PROSTATE CANCER (H): Primary | ICD-10-CM

## 2023-11-27 DIAGNOSIS — Z19.2 HORMONE RESISTANT PROSTATE CANCER (H): Primary | ICD-10-CM

## 2023-11-27 LAB
ALBUMIN SERPL BCG-MCNC: 3.8 G/DL (ref 3.5–5.2)
ALP SERPL-CCNC: 246 U/L (ref 40–150)
ALT SERPL W P-5'-P-CCNC: 15 U/L (ref 0–70)
ANION GAP SERPL CALCULATED.3IONS-SCNC: 10 MMOL/L (ref 7–15)
AST SERPL W P-5'-P-CCNC: 22 U/L (ref 0–45)
BASOPHILS # BLD AUTO: 0 10E3/UL (ref 0–0.2)
BASOPHILS NFR BLD AUTO: 0 %
BILIRUB SERPL-MCNC: 0.2 MG/DL
BUN SERPL-MCNC: 17.6 MG/DL (ref 8–23)
CALCIUM SERPL-MCNC: 8.8 MG/DL (ref 8.8–10.2)
CHLORIDE SERPL-SCNC: 107 MMOL/L (ref 98–107)
CREAT SERPL-MCNC: 1.02 MG/DL (ref 0.67–1.17)
DEPRECATED HCO3 PLAS-SCNC: 24 MMOL/L (ref 22–29)
EGFRCR SERPLBLD CKD-EPI 2021: 84 ML/MIN/1.73M2
EOSINOPHIL # BLD AUTO: 0.1 10E3/UL (ref 0–0.7)
EOSINOPHIL NFR BLD AUTO: 1 %
ERYTHROCYTE [DISTWIDTH] IN BLOOD BY AUTOMATED COUNT: 14.6 % (ref 10–15)
GLUCOSE SERPL-MCNC: 105 MG/DL (ref 70–99)
HCT VFR BLD AUTO: 34.1 % (ref 40–53)
HGB BLD-MCNC: 11 G/DL (ref 13.3–17.7)
IMM GRANULOCYTES # BLD: 0.1 10E3/UL
IMM GRANULOCYTES NFR BLD: 1 %
LYMPHOCYTES # BLD AUTO: 1.2 10E3/UL (ref 0.8–5.3)
LYMPHOCYTES NFR BLD AUTO: 21 %
MCH RBC QN AUTO: 28.8 PG (ref 26.5–33)
MCHC RBC AUTO-ENTMCNC: 32.3 G/DL (ref 31.5–36.5)
MCV RBC AUTO: 89 FL (ref 78–100)
MONOCYTES # BLD AUTO: 0.5 10E3/UL (ref 0–1.3)
MONOCYTES NFR BLD AUTO: 9 %
NEUTROPHILS # BLD AUTO: 4 10E3/UL (ref 1.6–8.3)
NEUTROPHILS NFR BLD AUTO: 68 %
NRBC # BLD AUTO: 0 10E3/UL
NRBC BLD AUTO-RTO: 0 /100
PLATELET # BLD AUTO: 243 10E3/UL (ref 150–450)
POTASSIUM SERPL-SCNC: 3.6 MMOL/L (ref 3.4–5.3)
PROT SERPL-MCNC: 6.7 G/DL (ref 6.4–8.3)
PSA SERPL DL<=0.01 NG/ML-MCNC: 2.81 NG/ML (ref 0–4.5)
RBC # BLD AUTO: 3.82 10E6/UL (ref 4.4–5.9)
SODIUM SERPL-SCNC: 141 MMOL/L (ref 135–145)
VIT B12 SERPL-MCNC: 853 PG/ML (ref 232–1245)
WBC # BLD AUTO: 5.9 10E3/UL (ref 4–11)

## 2023-11-27 PROCEDURE — 82607 VITAMIN B-12: CPT | Performed by: STUDENT IN AN ORGANIZED HEALTH CARE EDUCATION/TRAINING PROGRAM

## 2023-11-27 PROCEDURE — 84153 ASSAY OF PSA TOTAL: CPT | Performed by: STUDENT IN AN ORGANIZED HEALTH CARE EDUCATION/TRAINING PROGRAM

## 2023-11-27 PROCEDURE — 96375 TX/PRO/DX INJ NEW DRUG ADDON: CPT

## 2023-11-27 PROCEDURE — 80053 COMPREHEN METABOLIC PANEL: CPT

## 2023-11-27 PROCEDURE — 84403 ASSAY OF TOTAL TESTOSTERONE: CPT | Performed by: STUDENT IN AN ORGANIZED HEALTH CARE EDUCATION/TRAINING PROGRAM

## 2023-11-27 PROCEDURE — 99213 OFFICE O/P EST LOW 20 MIN: CPT | Performed by: STUDENT IN AN ORGANIZED HEALTH CARE EDUCATION/TRAINING PROGRAM

## 2023-11-27 PROCEDURE — 258N000003 HC RX IP 258 OP 636: Performed by: STUDENT IN AN ORGANIZED HEALTH CARE EDUCATION/TRAINING PROGRAM

## 2023-11-27 PROCEDURE — 99215 OFFICE O/P EST HI 40 MIN: CPT | Performed by: STUDENT IN AN ORGANIZED HEALTH CARE EDUCATION/TRAINING PROGRAM

## 2023-11-27 PROCEDURE — 250N000011 HC RX IP 250 OP 636: Performed by: STUDENT IN AN ORGANIZED HEALTH CARE EDUCATION/TRAINING PROGRAM

## 2023-11-27 PROCEDURE — 85014 HEMATOCRIT: CPT

## 2023-11-27 PROCEDURE — 96413 CHEMO IV INFUSION 1 HR: CPT

## 2023-11-27 PROCEDURE — 36415 COLL VENOUS BLD VENIPUNCTURE: CPT

## 2023-11-27 RX ORDER — ONDANSETRON 8 MG/1
8 TABLET, FILM COATED ORAL EVERY 8 HOURS PRN
Qty: 30 TABLET | Refills: 4 | Status: SHIPPED | OUTPATIENT
Start: 2023-11-27

## 2023-11-27 RX ORDER — HEPARIN SODIUM (PORCINE) LOCK FLUSH IV SOLN 100 UNIT/ML 100 UNIT/ML
5 SOLUTION INTRAVENOUS
Status: DISCONTINUED | OUTPATIENT
Start: 2023-11-27 | End: 2023-11-27 | Stop reason: HOSPADM

## 2023-11-27 RX ORDER — ALBUTEROL SULFATE 90 UG/1
1-2 AEROSOL, METERED RESPIRATORY (INHALATION)
Status: CANCELLED
Start: 2023-11-27

## 2023-11-27 RX ORDER — HEPARIN SODIUM,PORCINE 10 UNIT/ML
5-20 VIAL (ML) INTRAVENOUS DAILY PRN
Status: CANCELLED | OUTPATIENT
Start: 2023-11-27

## 2023-11-27 RX ORDER — MEPERIDINE HYDROCHLORIDE 25 MG/ML
25 INJECTION INTRAMUSCULAR; INTRAVENOUS; SUBCUTANEOUS EVERY 30 MIN PRN
Status: CANCELLED | OUTPATIENT
Start: 2023-11-27

## 2023-11-27 RX ORDER — LORAZEPAM 2 MG/ML
0.5 INJECTION INTRAMUSCULAR EVERY 4 HOURS PRN
Status: CANCELLED | OUTPATIENT
Start: 2023-11-27

## 2023-11-27 RX ORDER — ONDANSETRON 2 MG/ML
8 INJECTION INTRAMUSCULAR; INTRAVENOUS ONCE
Status: CANCELLED | OUTPATIENT
Start: 2023-11-27

## 2023-11-27 RX ORDER — PREDNISONE 10 MG/1
10 TABLET ORAL DAILY
Qty: 21 TABLET | Refills: 0 | Status: SHIPPED | OUTPATIENT
Start: 2023-11-27 | End: 2023-12-18

## 2023-11-27 RX ORDER — HEPARIN SODIUM (PORCINE) LOCK FLUSH IV SOLN 100 UNIT/ML 100 UNIT/ML
5 SOLUTION INTRAVENOUS
Status: CANCELLED | OUTPATIENT
Start: 2023-11-27

## 2023-11-27 RX ORDER — EPINEPHRINE 1 MG/ML
0.3 INJECTION, SOLUTION INTRAMUSCULAR; SUBCUTANEOUS EVERY 5 MIN PRN
Status: CANCELLED | OUTPATIENT
Start: 2023-11-27

## 2023-11-27 RX ORDER — METHYLPREDNISOLONE SODIUM SUCCINATE 125 MG/2ML
125 INJECTION, POWDER, LYOPHILIZED, FOR SOLUTION INTRAMUSCULAR; INTRAVENOUS
Status: CANCELLED
Start: 2023-11-27

## 2023-11-27 RX ORDER — ONDANSETRON 2 MG/ML
8 INJECTION INTRAMUSCULAR; INTRAVENOUS ONCE
Status: COMPLETED | OUTPATIENT
Start: 2023-11-27 | End: 2023-11-27

## 2023-11-27 RX ORDER — PROCHLORPERAZINE MALEATE 10 MG
10 TABLET ORAL EVERY 6 HOURS PRN
Qty: 30 TABLET | Refills: 2 | Status: SHIPPED | OUTPATIENT
Start: 2023-11-27 | End: 2024-02-13

## 2023-11-27 RX ORDER — ALBUTEROL SULFATE 0.83 MG/ML
2.5 SOLUTION RESPIRATORY (INHALATION)
Status: CANCELLED | OUTPATIENT
Start: 2023-11-27

## 2023-11-27 RX ORDER — DIPHENHYDRAMINE HYDROCHLORIDE 50 MG/ML
50 INJECTION INTRAMUSCULAR; INTRAVENOUS
Status: CANCELLED
Start: 2023-11-27

## 2023-11-27 RX ORDER — BUPRENORPHINE 20 UG/H
1 PATCH TRANSDERMAL WEEKLY
COMMUNITY
End: 2023-12-29 | Stop reason: ALTCHOICE

## 2023-11-27 RX ADMIN — SODIUM CHLORIDE 250 ML: 9 INJECTION, SOLUTION INTRAVENOUS at 14:08

## 2023-11-27 RX ADMIN — Medication 5 ML: at 15:47

## 2023-11-27 RX ADMIN — DIPHENHYDRAMINE HYDROCHLORIDE 25 MG: 50 INJECTION, SOLUTION INTRAMUSCULAR; INTRAVENOUS at 14:14

## 2023-11-27 RX ADMIN — ONDANSETRON 8 MG: 2 INJECTION INTRAMUSCULAR; INTRAVENOUS at 14:09

## 2023-11-27 RX ADMIN — SODIUM CHLORIDE 50 MG: 9 INJECTION, SOLUTION INTRAVENOUS at 14:46

## 2023-11-27 RX ADMIN — FAMOTIDINE 20 MG: 10 INJECTION, SOLUTION INTRAVENOUS at 14:11

## 2023-11-27 ASSESSMENT — PAIN SCALES - GENERAL: PAINLEVEL: MILD PAIN (2)

## 2023-11-27 NOTE — NURSING NOTE
Chief Complaint   Patient presents with    Port Draw     Port accessed by RN, labs collected, line flushed with saline and heparin.  Vitals taken. Pt checked in for appointment(s).      eSra Mcclendon RN

## 2023-11-27 NOTE — NURSING NOTE
"Oncology Rooming Note    November 27, 2023 12:28 PM   Walter Reyes is a 63 year old male who presents for:    Chief Complaint   Patient presents with    Port Draw     Port accessed by RN, labs collected, line flushed with saline and heparin.  Vitals taken. Pt checked in for appointment(s).     Oncology Clinic Visit     Prostate Cancer     Initial Vitals: /77 (BP Location: Left arm, Patient Position: Sitting, Cuff Size: Adult Large)   Pulse 54   Temp 98.1  F (36.7  C) (Oral)   Resp 18   Wt 117.9 kg (259 lb 14.4 oz)   SpO2 98%   BMI 36.25 kg/m   Estimated body mass index is 36.25 kg/m  as calculated from the following:    Height as of 11/15/23: 1.803 m (5' 11\").    Weight as of this encounter: 117.9 kg (259 lb 14.4 oz). Body surface area is 2.43 meters squared.  Mild Pain (2) Comment: Data Unavailable   No LMP for male patient.  Allergies reviewed: Yes  Medications reviewed: Yes    Medications: Medication refills not needed today.  Pharmacy name entered into EPIC:    [facility] [place], [place] - [address] [facility] [id number] [place], [place] - [address] [facility] [place], [place] - [address] [facility] [place], [place] - [address] [phone]  [facility] [place], [place] - [address] [address]    Clinical concerns: None       Meena See LPN  11/27/2023              "

## 2023-11-27 NOTE — PROGRESS NOTES
Infusion Nursing Note:  Walter PUENTES Leobardo Delgadohi presents today for Cycle 1 Day 1 Cabazitaxel.    Patient seen by provider today: Yes: Dr. Jasso   present during visit today: Not Applicable.    Note: Pt arrives to infusion today feeling okay. Pt has no further questions or concerns following his appointment with Dr. Jasso. Pt will proceed with treatment today.     Pt is receiving new Cabazitaxel, teaching was done by Adilene Restrepo RN. Pt had no other questions following appointment and teaching.       Intravenous Access:  Implanted Port.    Treatment Conditions:  Lab Results   Component Value Date    HGB 11.0 (L) 11/27/2023    WBC 5.9 11/27/2023    ANEU 5.0 07/07/2021    ANEUTAUTO 4.0 11/27/2023     11/27/2023        Lab Results   Component Value Date     11/27/2023    POTASSIUM 3.6 11/27/2023    MAG 1.9 08/25/2023    CR 1.02 11/27/2023    BETHANY 8.8 11/27/2023    BILITOTAL 0.2 11/27/2023    ALBUMIN 3.8 11/27/2023    ALT 15 11/27/2023    AST 22 11/27/2023       Results reviewed, labs MET treatment parameters, ok to proceed with treatment.      Post Infusion Assessment:  Patient tolerated infusion without incident.  Blood return noted pre and post infusion.  Site patent and intact, free from redness, edema or discomfort.  No evidence of extravasations.  Access discontinued per protocol.       Discharge Plan:   Prescription refills given for Prednisone.  Discharge instructions reviewed with: Patient and Family.  Patient and/or family verbalized understanding of discharge instructions and all questions answered.  Copy of AVS reviewed with patient and/or family.  Patient will return 12/19 for next appointment.  Patient discharged in stable condition accompanied by: self and wife.  Departure Mode: Ambulatory.      Barbara Macias RN

## 2023-11-27 NOTE — LETTER
11/27/2023         RE: Walter Reyes  [address] [facility] [address]        Dear Colleague,    Thank you for referring your patient, Walter Reyes, to the [facility]. Please see a copy of my visit note below.        [facility] Followup  Oncologist: Tabby  Nov 27, 2023    REASON FOR VISIT: Follow-up for metastatic castration-resistant prostate cancer.      INTERVAL HISTORY: Feeling about the same today.  Opted to get radiation to ribs after talking with Dr. Albert.  This will take some planning due to the location.  Appetite and energy are about the same.  Pain is overall the same.  Biopsy was done at [facility].      ONCOLOGY HISTORY: Mr. Walter Reyes is a 63 year old gentleman with a metastatic castration-sensitive prostate cancer and incidentally diagnosed stage 3 clear cell RCC (s/p radical R nephrectomy on 3/19/19) which is now 4.5+ years post-therapy without evidence of recurrence. His oncologic history is detailed below.     1/6/21  PSA = 0.29  3/31/21 PSA = 0.44  7/7/21  PSA = 0.47  11/2021 PSA = 1.05  -- Start XTANDI  12/16/21 PSA =0.22  2/11/22 PSA= 0.50  3/22/22 PSA=0.72  5/5/2022 PSA=1.79  7/11/2022 PSA=2.16 --Start cycle 1 docetaxel   8/22/22 PSA = 1.36  9/12/22 PSA = 1.09  10/24/22 Cycle #6 of Docetaxel. End of treatment  1/9/23  PSA =0.66  3/20/23  PSA=1.54  4/21/23 PSA = 1.81  T=15  5/22/23 C1 Pluvicto   06/07/23          PSA = 1.42  7/18/23 PSA=1.75, C2 Pluvicto   8/28/23 Transfer of care initial visit for Tabby.  PSA 2.06.  C3 Pluvicto scheduled for 8/30.  Proceed with dose as planned.  MR DELORIS femur, ortho referral, PSMA PET ordered for prior to follow-up.    10/2/23 Palliative RT to L femur metastasis complete.  PSMA PET with mixed response.  PSA 2.21.  RT to L femur  pending.  Continue with Dose #4 of Pluvicto despite mixed response to therapy.  Dex course for possible pain   Flare with RT.    11/8/23  Follow-up  "prior to Dose #5 Pluvicto.  More pain in chest/ribs/back. PSA 4.38.  Testosterone=3.  Rad onc referral for ribs.  Biopsy progressive lesion for     Progression on Pluvicto.  Cabazitaxel scheduled for follow-up appointment.  Pending Tempus testing on biopsy sample versus peripheral blood.   11/27/23 Bx completed from R iliac crest but unrevealing for either PCa or RCC.  Start cabazitaxel C1D1.  PSA 2.81.      ONCOLOGIC HISTORY:  1. 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 Naylor at [facility]. Prior PSA were 1.4 on 8/16/17, 2.4 on 6/28/16, 2.9 on 6/28/16, and as low as 0.4 on 3/26/2003.   - 1/08/2019: Consultation with Sarah Wilson CNP in Urology clinic. Repeat PSA 9.6.  - 1/16/2019: MRI prostate with contrast - \"This examination is characterized as PIRADS 5- very high probability. Clinically significant cancer is highly likely to be present. There is a large, invasive mass arising from the right peripheral zone and extending into the neurovascular bundle, seminal vesicle, and along the anterolateral right mesorectal fascia. Metastatic right external iliac lymph node. Metastatic lesion in the posterior/superior right acetabulum.\"  - 1/25/2019: CT abdomen and pelvis with IV contrast - \"1. Heterogeneous enhancing mass posteriorly in the upper pole of the right kidney measures 4.5 x 5.8 x 5.7 cm (AP by transverse by craniocaudal). It has a small nodular component extending posterior medially which abuts the right psoas muscle. This nodular extension measures 2.1 x 2.1 cm. Minimal stranding about the mass. No definite thrombus within the right renal vein. This renal mass is compatible with a renal cell carcinoma. A paraaortic lymph node situated immediately posterior to the left renal vein measures 1.1 cm in short axis, suspicious for " "metastasis. 2. A 2 cm right external iliac lymph node is also suspicious for metastases. 3. Multiple sclerotic osseous lesions suspicious for metastases. These include 0.8 and 0.6 cm sclerotic lesions laterally in the right iliac wing (images 53 and 62 respectively). Ill-defined groundglass density laterally in the right acetabulum measuring 1.7 x 1.2 cm corresponds with the lesion identified on MRI. A 0.4 cm groundglass density in the left acetabulum. Sclerotic lesion in the left femoral neck measures 0.9 x 1.6 cm (image 81). Sclerotic metastases would be more compatible with prostate metastases. 4. A 3 subcentimeter hepatic lesions are indeterminate. Metastases would be a consideration. These can be further characterized with liver MRI.\"  - 1/25/2019: NM bone scan - \"There is focal bony uptake in the left femoral neck, right acetabulum, right of midline at the S1 or L5 level of the spine, within multiple bilateral ribs, in the C7 or T1 level of the spine, and anteriorly within the skull. Findings are suspicious for metastases.\"  - 1/31/19: CT chest with contrast - \" No suspicious nodules in the chest.  Stable appearance of a 5.8 cm right renal mass concerning for renal carcinoma until proven otherwise.  Stable indeterminant subcentimeter hypodensities in the liver.  Multifocal osteoblastic metastasis including 2.5 cm lesion in the right fifth rib posteriorly and a 1.6 cm lesion in the right third rib posteriorly. No lytic lesions identified.\"  - 2/6/19: CT-guided right sclerotic fifth rib lesion biopsy - \"Metastatic carcinoma, consistent with prostate primary.  Immunohistochemical stains performed show the metastatic carcinoma stains positive for NKX3.1 (prostate marker), negative for STACIE 3 and PAX8, which supports the above diagnosis.\"  - 2/15/19: Started Casodex 50mg every day and consented for the biobanking protocol. 3/18/19 - stopped Casodex.  - 2/19/19: Case discussed in tumor board - recommendation for " "nephectomy for suspected malignant right renal mass.   - 2/26/19: Started Lupron 22.5mg every 3 months.   - 5/8/19: Started Docetaxel 75mg/m2 IV every 3 weeks. 06/18/19 - cycle 3, 7/10/19 - cycle 4, 07/31/19 - cycle 5, 08/21/19 - cycle 6.   - 10/2/19: Restaging CT CAP and NM Bone Scan showed improved osseous disease, no evidence of recurrent or new metastatic disease.  - 1/5/20: Restaging CT CAP and NM Bone Scan showed stable osseous disease, no evidence of recurrent or new metastatic disease.  - 4/6/20: NM bone scan with slightly improved uptake in known skeletal mets. CT C/A/P with contrast with stable osseous mets, no yusuf enlargement, no new visceral mets etc.  - 04/08/20: Zometa every 3 months.  - 10/5/20: CT C/A/P with contrast and NM bone scan - SD.   - 1/4/21: CT C/A/P with contrast and NM bone scan - SD but slight increase in right 5th rib tracer uptake and in posterior L5 sclerosis.   - 03/29/21: CT C/A/P with contrast - single new right sacral 8mm bone lesion (suspicious), other bone mets stable. No visceral/yusuf disease. Nephrectomy site without recurrence. NM bone scan - SD but \"increased uptake associated with the prior lesions of the lower  cervical spine, posterior right fourth rib and right L5 posterior arch elements. Otherwise unchanged uptake associated with the proximal left femur and left anterior fourth rib. Similar uptake of the left halux, possibly secondary to degenerative osteoarthritis or gout.\"      Radiation history:   C7         3,000 cGy       06 X      8/05/2021        8/18/2021        13       10  2 Sacrum          2,500 cGy       18 X      4/12/2022        4/18/2022         6               Sacrum retreat               700 cGy        18 X      2/28/2023        2/28/2023    L femur Pending for October 2023.     2. Stage III ([id number]), grade 3 of 4, clear-cell RCC of right kidney:  - Incidentally diagnosed as above.   - Underwent curative-intent robotic right radical nephrectomy " "with Dr. Wesley Cleveland on 3/19/19. No tumor spillage per op report.   - Path showed: \"Histologic Type: Clear cell renal cell carcinoma; Sarcomatoid Features: Not identified; Histologic Grade: Nucleolar grade 3 (WHO/ISUP). Extent Tumor Size: 4.3 cm. Microscopic Tumor Extension: Tumor extension into renal sinus (in vascular structures). Margins: Negative. Tumor Necrosis: Present; focal. Lymph-Vascular Invasion: Not identified.  Pathologic Staging (pTNM) Primary Tumor (pT): pT3a: Tumor extends into renal vein branches/renal sinus. Regional Lymph Nodes (pN): pNX. Number of Lymph Nodes Examined: 0 Distant Metastasis (pM): pM N/A.\"  - Restaging scans as above.  No current concerns for recurrence.      PAST MEDICAL HISTORY:  Past Medical History:   Diagnosis Date    Cancer of kidney, right (H)     Complication of anesthesia     slow wakeup     Malignant neoplasm of prostate metastatic to bone (H) 2/14/2019    Thyroid disease      CURRENT MEDICATIONS:   Current Outpatient Medications:     ondansetron (ZOFRAN) 8 MG tablet, Take 1 tablet (8 mg) by mouth every 8 hours as needed for nausea, Disp: 30 tablet, Rfl: 4    atorvastatin (LIPITOR) 20 MG tablet, Take 60 mg by mouth daily , Disp: , Rfl:     buprenorphine (BUTRANS) 20 MCG/HR WK patch, Place 1 patch onto the skin once a week, Disp: , Rfl:     calcium citrate-vitamin D (CITRACAL) 315-250 MG-UNIT TABS per tablet, Take 650 mg by mouth 2 times daily , Disp: , Rfl:     cycloSPORINE (RESTASIS) 0.05 % ophthalmic emulsion, Place 1 drop into both eyes 2 times daily , Disp: , Rfl:     dexAMETHasone (DECADRON) 4 MG tablet, Take 1 tablet (4 mg) by mouth daily, Disp: 7 tablet, Rfl: 2    fentaNYL (DURAGESIC) 12 mcg/hr 72 hr patch, Place 2 patches onto the skin every 72 hours Remove old fentanyl patch before placing new patch each time., Disp: , Rfl:     gabapentin (NEURONTIN) 300 MG capsule, TAKE ONE CAPSULE BY MOUTH TWICE DAILY AND 2 CAPSULES AT BEDTIME, Disp: 120 capsule, Rfl: 3    " Glucosamine-Chondroit-Vit C-Mn (GLUCOSAMINE 1500 COMPLEX) CAPS, Take 1 capsule by mouth daily, Disp: , Rfl:     levothyroxine (SYNTHROID/LEVOTHROID) 112 MCG tablet, Take 112 mcg by mouth daily, Disp: , Rfl:     loratadine (CLARITIN) 10 MG tablet, Take 10 mg by mouth daily, Disp: , Rfl:     Multiple Vitamins-Minerals (MULTIVITAMIN ADULT EXTRA C PO), Take 1 tablet by mouth every 24 hours, Disp: , Rfl:     naloxone (NARCAN) 4 MG/0.1ML nasal spray, Spray 1 spray (4 mg) into one nostril alternating nostrils as needed for opioid reversal every 2-3 minutes until assistance arrives, Disp: 0.2 mL, Rfl: 1    Nutritional Supplements (SALMON OIL) CAPS, Take 2 capsules by mouth daily , Disp: , Rfl:     omeprazole (PRILOSEC) 20 MG DR capsule, Take 20 mg by mouth daily, Disp: , Rfl:     oxyCODONE (ROXICODONE) 5 MG tablet, Take 1-2 tablets (5-10 mg) by mouth every 3 hours as needed for severe pain, Disp: 90 tablet, Rfl: 0    predniSONE (DELTASONE) 10 MG tablet, Take 1 tablet (10 mg) by mouth daily for 21 days, Disp: 21 tablet, Rfl: 0    prochlorperazine (COMPAZINE) 10 MG tablet, Take 1 tablet (10 mg) by mouth every 6 hours as needed for nausea or vomiting, Disp: 30 tablet, Rfl: 2    prochlorperazine (COMPAZINE) 10 MG tablet, Take 1 tablet (10 mg) by mouth every 6 hours as needed for nausea or vomiting, Disp: 90 tablet, Rfl: 1    tamsulosin (FLOMAX) 0.4 MG capsule, Take 1 capsule (0.4 mg) by mouth daily, Disp: 30 capsule, Rfl: 3    triamterene-HCTZ (MAXZIDE-25) 37.5-25 MG tablet, Take 1 tablet by mouth daily, Disp: , Rfl:   No current facility-administered medications for this visit.    Physical Exam:   BP (!) 143/80 (BP Location: Right arm, Patient Position: Sitting, Cuff Size: Adult Large)   Pulse 58   Temp 98.1  F (36.7  C) (Oral)   Resp 18   Wt 117.9 kg (259 lb 14.4 oz)   SpO2 98%   BMI 36.25 kg/m    General: Patient appears well in no acute distress.   Skin: No visualized rash or lesions on visualized skin  Eyes: EOMI, no  "erythema, sclera icterus or discharge noted  Resp: Appears to be breathing comfortably without accessory muscle usage, speaking in full sentences, no cough  MSK: Appears to have normal range of motion based on visualized movements  Neurologic: No apparent tremors, facial movements symmetric  Psych: affect bright, alert and oriented    ECOG PS 1.    LABORATORY DATA:  Most Recent 3 CBC's:  Recent Labs   Lab Test 11/27/23  1207 11/08/23  0856 10/10/23  1336   WBC 5.9 7.7 7.3   HGB 11.0* 10.9* 12.2*   MCV 89 88 91    226 249   ANEUTAUTO 4.0 6.4 5.0     Most Recent 3 BMP's:  Recent Labs   Lab Test 11/27/23  1207 11/08/23  0856 10/10/23  1336    139 139   POTASSIUM 3.6 4.1 3.6   CHLORIDE 107 107 103   CO2 24 22 24   BUN 17.6 16.0 23.1*   CR 1.02 0.86 0.99   ANIONGAP 10 10 12   BETHANY 8.8 9.2 9.0   * 118* 118*   PROTTOTAL 6.7 7.1 6.5   ALBUMIN 3.8 4.0 3.9    Most Recent 3 LFT's:  Recent Labs   Lab Test 11/27/23  1207 11/08/23  0856 10/10/23  1336   AST 22 31 19   ALT 15 16 23   ALKPHOS 246* 177* 218*   BILITOTAL 0.2 0.2 0.3    Most Recent 2 TSH and T4:  Recent Labs   Lab Test 07/07/21  0834 03/31/21  0824   TSH 1.67 1.63       I reviewed the above labs today.    PSA trend, see oncology history.     No new imaging studies at time of encounter.      R iliac crest biopsy from 11/21/23 without evidence for malignancy.      ASSESSMENT & PLAN: Mr. Reyes is a delightful 63 year old gentleman with metastatic castration sensitive prostate adenocarcinoma as well as an incidentally diagnosed stage III clear-cell renal cell carcinoma of the right kidney (s/p radical R nephrectomy 3/19/19), who is here for a follow-up visit and initiation of docetafor now metastatic castration-resistant prostate cancer.     1. Metastatic castration-resistant prostate cancer, with involvement of the bones and RPLN:   - Patient met the criteria for CHAARTED \"high-volume\" metastatic hormone-sensitive prostate cancer. He opted for " docetaxel in combination with ADT (per JOSEFA, [id number], KARLA). He was subsequently treated with docetaxel x6 doses in the [facility] setting and enzalutamide.  He initiated Pluvicto in May 2023 with an initial slight decline in PSA, but this began to rise just prior to Cycle 3.  Given his XR evidence of progression in L femur and PSA rise, we re-evaluated his progression with PSMA PET scan in September 2023, with note of mixed response.  Reconsented for  [employer] on 8/28/23.  Due for 3 month collection at end of November 2023.    -Proceed with pluvicto dose #4.    -Zometa next due 1/24/2024 along with Lupron.   -Palliative to manage further opiate refills.   -Dexamethasone burst for RT and one on hand if needed for pain flares.   Let Oncology team know if this is used.  Refills sent 11/9/23.    -Canceled Pluvicto Dose for November due to symptomatic progression with PSA rise.    -PSA is labile  -Cabazitaxel to start 11/27/23 given progression on Pluvicto.  Biopsy results from 11/21 are pending for evaluation of NEPC vs small cell given progression without a significant rise in PSA.  Will also send peripheral blood Tempus for evaluation of targetable mutations given the negative result from the 11/21/23 bone biopsy.      2. Bone metastases:   - Noted to have osseous metastatic disease at the time of diagnosis. S/p radiation to C spine and sacrum (Re-irradiation to sacrum).   - worsening radiculopathy down starting in the low back and radiating down the left leg in June. MRI read was without new/acute abnormalities  - Pain continues to fluctuate but overall well controlled/managable   - pain mgmt per palliative care, on butrans patch now with addition of dilaudid.    - Encouraged to continue calcium and vitamin D supplementation; continue Zometa 4mg IV every ~3 months. Due next in November 2023.   - RT to L femur 10/2023.  Pending RT for R sided ribs 12/2023 with Dr. Albert between C1 and C2 of cabazitaxel.  -  Added to trial list for [id number], [id number].       3. Stage 3, UISS-high risk ccRCC: s/p R nephrectomy   - Nephrectomy 3/19/19 and noted incidentally.  He is now 4.5 years post-op without evidence for recurrence.   - At this point, there is a minimal risk of recurrence of his RCC given the time since surgery without the use of adjuvant immunotherapy. There is no suspicious adenopathy or other features on his most recent imaging studies.    - Will continue to monitor with imaging planned for prostate cancer.      PLAN:   1. C1D1 Cabazitaxel today.   2. Biopsy unrevealing for malignancy.    3. Will request Tempus blood circulating tumor DNA evaluation for targetable mutations.      A total of 40 minutes were spent on this patient on the day of the encounter, of which more than 50% of this time was used for counseling and coordination of care.  The patient and his wife were given the opportunity to ask multiple questions today, all of which were answered to their satisfaction.     Omar Mcnair MD, PhD   of Medicine   Oncology/BMT/Cellular Therapies

## 2023-11-27 NOTE — PATIENT INSTRUCTIONS
Contact Numbers  [facility]: [phone] (for symptom and scheduling needs)    Please call the [facility] Triage line if you experience a temperature greater than or equal to 100.4, shaking chills, have uncontrolled nausea, vomiting and/or diarrhea, dizziness, shortness of breath, chest pain, bleeding, unexplained bruising, or if you have any other new/concerning symptoms, questions or concerns.     If you are having any concerning symptoms or wish to speak to a provider before your next infusion visit, please call your care coordinator or triage to notify them so we can adequately serve you.     If you need a refill on a narcotic prescription or other medication, please call triage before your infusion appointment.       November 2023 Sunday Monday Tuesday Wednesday Thursday Friday Saturday                  1    INFUSION 1 HR (60 MIN)   9:00 AM   (60 min.)    INFUSION CHAIR   [facility] 2     3     4       5     6     7     8    LAB CENTRAL   8:15 AM   (15 min.)   [facility] LAB DRAW   [facility]    RETURN CCSL   8:45 AM   (30 min.)   Omar Mcnair MD   [facility] 9     10    CT CHEST/ABDOMEN/PELVIS W   6:30 AM   (20 min.)   [email or web address] 11       12     13     14     15    VIDEO VISIT RETURN   9:00 AM   (40 min.)   Dean Zeng APRN CNP   [facility] 16     17     18       19     20     21    CT BONE BIOPSY DEEP   9:00 AM   (60 min.)   RHCT1   [facility] 22     23     24    RETURN RADIATION ONCOLOGY   8:30 AM   (30 min.)   Lexis Albert   [facility] 25       26     27    LAB CENTRAL  11:15 AM   (15 min.)   [facility] LAB DRAW   [facility]    RETURN CCSL  11:45 AM   (30 min.)   Omar Mcnair MD   [facility]  Clinic    ONC INFUSION 3 HR (180 MIN)   2:00 PM   (180 min.)    ONC INFUSION NURSE   [facility] 28     29    CT SIM  10:00 AM   (60 min.)   Lexis Albert   [facility]iation [facility] 30 December 2023 Sunday Monday Tuesday Wednesday Thursday Friday Saturday                            1     2       3     4     5     6     7     8     9       10     11    VIDEO VISIT RETURN  11:00 AM   (40 min.)   Dean Zeng APRN CNP   [facility] 12     13     14     15     16       17     18     19    LAB CENTRAL   8:15 AM   (15 min.)    [facility] DRAW   [facility]    RETURN CCSL   8:45 AM   (45 min.)   Sherry Lee APRN CNP   [facility]    ONC INFUSION 3 HR (180 MIN)  10:00 AM   (180 min.)    ONC INFUSION NURSE   [facility] 20     21     22     23       24     25     26     27     28     29     30       31                                                   Lab Results:  Recent Results (from the past 12 hour(s))   Comprehensive metabolic panel    Collection Time: 11/27/23 12:07 PM   Result Value Ref Range    Sodium 141 135 - 145 mmol/L    Potassium 3.6 3.4 - 5.3 mmol/L    Carbon Dioxide (CO2) 24 22 - 29 mmol/L    Anion Gap 10 7 - 15 mmol/L    Urea Nitrogen 17.6 8.0 - 23.0 mg/dL    Creatinine 1.02 0.67 - 1.17 mg/dL    GFR Estimate 84 >60 mL/min/1.73m2    Calcium 8.8 8.8 - 10.2 mg/dL    Chloride 107 98 - 107 mmol/L    Glucose 105 (H) 70 - 99 mg/dL    Alkaline Phosphatase 246 (H) 40 - 150 U/L    AST 22 0 - 45 U/L    ALT 15 0 - 70 U/L    Protein Total 6.7 6.4 - 8.3 g/dL    Albumin 3.8 3.5 - 5.2 g/dL    Bilirubin Total 0.2 <=1.2 mg/dL   PSA tumor marker    Collection Time: 11/27/23 12:07 PM   Result Value Ref Range    PSA Tumor Marker 2.81 0.00 - 4.50 ng/mL   CBC with platelets and differential    Collection Time: 11/27/23 12:07 PM   Result Value  Ref Range    WBC Count 5.9 4.0 - 11.0 10e3/uL    RBC Count 3.82 (L) 4.40 - 5.90 10e6/uL    Hemoglobin 11.0 (L) 13.3 - 17.7 g/dL    Hematocrit 34.1 (L) 40.0 - 53.0 %    MCV 89 78 - 100 fL    MCH 28.8 26.5 - 33.0 pg    MCHC 32.3 31.5 - 36.5 g/dL    RDW 14.6 10.0 - 15.0 %    Platelet Count 243 150 - 450 10e3/uL    % Neutrophils 68 %    % Lymphocytes 21 %    % Monocytes 9 %    % Eosinophils 1 %    % Basophils 0 %    % Immature Granulocytes 1 %    NRBCs per 100 WBC 0 <1 /100    Absolute Neutrophils 4.0 1.6 - 8.3 10e3/uL    Absolute Lymphocytes 1.2 0.8 - 5.3 10e3/uL    Absolute Monocytes 0.5 0.0 - 1.3 10e3/uL    Absolute Eosinophils 0.1 0.0 - 0.7 10e3/uL    Absolute Basophils 0.0 0.0 - 0.2 10e3/uL    Absolute Immature Granulocytes 0.1 <=0.4 10e3/uL    Absolute NRBCs 0.0 10e3/uL

## 2023-11-27 NOTE — PROGRESS NOTES
"[facility]: Cancer Care Plan of Care Education Note                                    Discussion with Patient:                                                    Met with patient  to discuss chemotherapy and resources available at the [facility]. Provided patient with \"My Cancer Guidebook\", and Oncology printouts on Cabazitaxel. Reviewed side effect, managing diarrhea and post chemo instructions. .  Provided phone numbers for triage and after hours care. Discussed that chemo treatment may be delayed a day or two due to blood counts, infusion schedule or patient's need to modify. Included a one page resource of symptoms and when to contact the Cancer Clinic with questions or concerns.  Patient was appreciative of information provided.     Patient has had chemotherapy in the past. He still has some residual neuropathy. Patient is interested in Acupuncture, will get prescription and resources to patient.                                                                                                                                               Goals          General    Other     Notes - Note created  11/27/2023  1:15 PM by Adilene Restrepo RN    Goal Statement: I will use my clinic and care team resources as directed.  Date Goal set: 11/27/2023  Barriers: disease burden  Strengths: support, coping, motivation, health awareness, and involvement with care team  Date to Achieve By: ongoing  Patient expressed understanding of goal: Yes  Action steps to achieve this goal:  I will contact triage with new, worsening or uncontrolled symptoms.   I will contact triage with temperature over 100.4  I will call with difficulties of scheduling and/or transportation.   I will request needed refills when there are 7 days of medication remaining.   I will not send urgent or symptomatic messages through [employer].   I will contact scheduling to arrange or make changes in my appointments.                Assessment:     "                                                  Assessment completed with:: Patient;Spouse or significant other    Plan of Care Education   Yearly learning assessment completed?: Yes (see Education tab)  Diagnosis:: RCC  Does patient understand diagnosis?: Yes  Tx plan/regimen:: Cabzitaxel  Does patient understand treatment plan/regimen?: Yes  Preparing for treatment:: Reviewed treatment preparation information with patient (vascular access, day of chemo, visitor policy, what to bring, etc.)  Vascular access education provided for:: Peripheral IV  Side effect education:: Diarrhea/Constipation;Fatigue;Hair loss;Infection;Neuropathy;Nausea/Vomiting;Mylosuppression;Mouth sores;Lab value monitoring (anemia, neutropenia, thrombocytopenia)  Safety/self care at home reviewed with patient:: Yes  Coping - concerns/fears reviewed with patient:: Yes  Plan of Care:: [facility] follow-up appointment;Lab appointment;MD follow-up appointment;Treatment schedule  When to call provider:: Bleeding;Increased shortness of breath;New/worsening pain;Shaking chills;Temperature >100.4F;Uncontrolled diarrhea/constipation;Uncontrolled nausea/vomiting  Reasons for deferring treatment reviewed with patient:: Yes  Additional education provided for: : Bleeding precautions;Neutropenic precautions    Evaluation of Learning  Patient Education Provided: Yes  Readiness:: Acceptance  Method:: Literature;Explanation  Response:: Verbalizes understanding        Intervention/Education provided during outreach:                                                     See above    Patient to follow up as scheduled at next appt  Adilene BARBOURN, RN, OCN  Care Coordinator  [facility]

## 2023-11-28 DIAGNOSIS — C61 PROSTATE CANCER (H): Primary | ICD-10-CM

## 2023-11-28 DIAGNOSIS — G89.3 CANCER ASSOCIATED PAIN: ICD-10-CM

## 2023-11-28 LAB
PATH REPORT.COMMENTS IMP SPEC: NORMAL
PATH REPORT.FINAL DX SPEC: NORMAL
PATH REPORT.GROSS SPEC: NORMAL
PATH REPORT.MICROSCOPIC SPEC OTHER STN: NORMAL
PATH REPORT.MICROSCOPIC SPEC OTHER STN: NORMAL
PATH REPORT.RELEVANT HX SPEC: NORMAL
PHOTO IMAGE: NORMAL
TESTOST SERPL-MCNC: <2 NG/DL (ref 240–950)

## 2023-11-28 PROCEDURE — 88307 TISSUE EXAM BY PATHOLOGIST: CPT | Mod: 26 | Performed by: PATHOLOGY

## 2023-11-28 PROCEDURE — 88311 DECALCIFY TISSUE: CPT | Mod: 26 | Performed by: PATHOLOGY

## 2023-11-28 PROCEDURE — 88341 IMHCHEM/IMCYTCHM EA ADD ANTB: CPT | Mod: 26 | Performed by: PATHOLOGY

## 2023-11-28 PROCEDURE — 88342 IMHCHEM/IMCYTCHM 1ST ANTB: CPT | Mod: 26 | Performed by: PATHOLOGY

## 2023-11-28 NOTE — TELEPHONE ENCOUNTER
Received [employer] TVt message from patient requesting refill of fentanyl 25 mcg patch.     Last refill: 11/17/23 (12 mcg patches, dose adjusted 11/22/23)  Last office visit: 11/15/23  Scheduled for follow up 12/11/23     Will route request to NP for review.     Reviewed [place]  Report.

## 2023-11-29 ENCOUNTER — OFFICE VISIT (OUTPATIENT)
Dept: RADIATION ONCOLOGY | Facility: CLINIC | Age: 63
End: 2023-11-29
Attending: RADIOLOGY
Payer: COMMERCIAL

## 2023-11-29 DIAGNOSIS — C79.51 MALIGNANT NEOPLASM OF PROSTATE METASTATIC TO BONE (H): Primary | ICD-10-CM

## 2023-11-29 DIAGNOSIS — C61 MALIGNANT NEOPLASM OF PROSTATE METASTATIC TO BONE (H): Primary | ICD-10-CM

## 2023-11-29 PROCEDURE — 99211 OFF/OP EST MAY X REQ PHY/QHP: CPT | Performed by: RADIOLOGY

## 2023-11-29 RX ORDER — FENTANYL 25 UG/1
1 PATCH TRANSDERMAL
Qty: 10 PATCH | Refills: 0 | Status: SHIPPED | OUTPATIENT
Start: 2023-11-29 | End: 2023-11-30

## 2023-11-29 NOTE — PROGRESS NOTES
Radiation Therapy Patient Education    Person involved with teaching: Patient and Wife    Patient educational needs for self management of treatment-related side effects assessment completed.  [facility] Patient Ed tab contains Patient Learning Assessment    Education Materials Given   SIM pamphlet and schedule    Educational Topics Discussed  Side effects expected, Pain management, Skin care, Activity, and When to call MD/RN    Response To Teaching  Verbalizes understanding    GYN Only  Vaginal Dilator-given and educated: N/A    Referrals sent: None    Chemotherapy?  No

## 2023-11-29 NOTE — LETTER
11/29/2023         RE: Walter Reyes  [address]  [place] [address]        Dear Colleague,    Thank you for referring your patient, Walter Reyes, to the [facility] RADIATION ONCOLOGY. Please see a copy of my visit note below.    Radiation Therapy Patient Education    Person involved with teaching: Patient and Wife    Patient educational needs for self management of treatment-related side effects assessment completed.  [facility] Patient Ed tab contains Patient Learning Assessment    Education Materials Given   SIM pamphlet and schedule    Educational Topics Discussed  Side effects expected, Pain management, Skin care, Activity, and When to call MD/RN    Response To Teaching  Verbalizes understanding    GYN Only  Vaginal Dilator-given and educated: N/A    Referrals sent: None    Chemotherapy?  No         Again, thank you for allowing me to participate in the care of your patient.        Sincerely,        Lexis Albert

## 2023-11-30 ENCOUNTER — PATIENT OUTREACH (OUTPATIENT)
Dept: ONCOLOGY | Facility: CLINIC | Age: 63
End: 2023-11-30

## 2023-11-30 DIAGNOSIS — C61 PROSTATE CANCER (H): ICD-10-CM

## 2023-11-30 DIAGNOSIS — G89.3 CANCER ASSOCIATED PAIN: ICD-10-CM

## 2023-11-30 RX ORDER — FENTANYL 25 UG/1
1 PATCH TRANSDERMAL
Qty: 10 PATCH | Refills: 0 | Status: SHIPPED | OUTPATIENT
Start: 2023-11-30 | End: 2023-12-29

## 2023-11-30 NOTE — TELEPHONE ENCOUNTER
Resending prescription for Fentanyl 25 mcg patch to a different pharmacy who has it in stock. Pt would like to change over to [facility] for all prescriptions going forward.    KM MckeonN, RN  Palliative Care Nurse Clinician    [phone] (Direct)  [phone] (Main)  [phone] (Appointment Scheduling)

## 2023-11-30 NOTE — PROGRESS NOTES
[facility]: Cancer Care - RN CC Symptom Call          Situation                                                    RN called for post chemo check in. Patient reports slight fatigue, but improves with rest. Patient had nausea for the first couple days, however since resolved. Patient report numbness in his upper lip area. He notices this more when he is eating. He reports having an acupuncture appointment next week. RN reviewed side effects and numbers to the clinic. RN updated Dr Mcnair on his numbness.                                                Assessment                                                      Presenting Problem: upper lip numbness after Cabazitaxel  Cancer Diagnosis: prostate  Cancer treatment & last dose:   Infusion given in last 28 days       Administered MAR Action Medication Dose Rate Visit    11/27/2023 14:46 New Bag cabazitaxel (JEVTANA) 50 mg in sodium chloride 0.9% in non-PVC container 280 mL infusion 50 mg 280 mL/hr Infusion Therapy Visit on 11/27/2023 in [facility]          Treatment Plan Medications       OP ONC Prostate Cancer - Cabazitaxel / predniSONE       Take Home Medications       Medication Sig Start/End Day/Cycle Status    predniSONE (DELTASONE) 10 MG tablet Take 1 tablet (10 mg) by mouth daily for 21 days S to S+21 Day 1, Cycle 2 - 12/18/2023 Unsigned                            Subjective/Objective: numbness in upper lip    Onset: gradual  Duration: ongoing      Progression of Symptoms: same    Accompanying signs and symptoms: none    History:        Recent medication - New or changes (RX or OTC): YES       Allergies verified: Yes       Recent infusions: Yes: Cabazitaxel        Oral chemotherapy: No    Precipitating or alleviating factors: new Cabazitaxel    Therapies tried and outcome: will be trying acupuncture next week.                 Intervention                                                      Homecare instructions: Call triage if  symptoms worsen.     Plan                                                         -   Dr. Mcnair notified    Patient verbalizes understanding of and agrees with plan? YES    Adilene BARBOURN, RN, OCN  Care Coordinator  [facility]

## 2023-12-05 ENCOUNTER — PATIENT OUTREACH (OUTPATIENT)
Dept: ONCOLOGY | Facility: CLINIC | Age: 63
End: 2023-12-05

## 2023-12-05 NOTE — PROGRESS NOTES
[facility]: Cancer Care                                                                                          RN called and talked with patient regarding what was needed to complete his STD paperwork. Patient stated that the company needs medical notes. RN faxed over medical notes from last visit to [phone]    Adilene BARBOURN, RN, OCN  Care Coordinator  [facility]

## 2023-12-13 ENCOUNTER — OFFICE VISIT (OUTPATIENT)
Dept: RADIATION ONCOLOGY | Facility: CLINIC | Age: 63
End: 2023-12-13
Attending: RADIOLOGY
Payer: COMMERCIAL

## 2023-12-13 VITALS
SYSTOLIC BLOOD PRESSURE: 125 MMHG | RESPIRATION RATE: 18 BRPM | DIASTOLIC BLOOD PRESSURE: 70 MMHG | HEART RATE: 54 BPM | WEIGHT: 260.1 LBS | OXYGEN SATURATION: 97 % | BODY MASS INDEX: 36.28 KG/M2

## 2023-12-13 DIAGNOSIS — C79.51 MALIGNANT NEOPLASM OF PROSTATE METASTATIC TO BONE (H): Primary | ICD-10-CM

## 2023-12-13 DIAGNOSIS — C61 MALIGNANT NEOPLASM OF PROSTATE METASTATIC TO BONE (H): Primary | ICD-10-CM

## 2023-12-13 PROCEDURE — 77386 HC IMRT TREATMENT DELIVERY, COMPLEX: CPT | Performed by: RADIOLOGY

## 2023-12-13 ASSESSMENT — PAIN SCALES - GENERAL: PAINLEVEL: NO PAIN (1)

## 2023-12-13 NOTE — LETTER
2023         RE: Walter Reyes  [address] [place] [address]        Dear Colleague,    Thank you for referring your patient, Walter Reyes, to the [facility] RADIATION ONCOLOGY. Please see a copy of my visit note below.    RADIATION ONCOLOGY WEEKLY ON TREATMENT VISIT   Encounter Date: Dec 13, 2023     Patient Name: Walter Reyes  MRN: [id number]  : 1960     Disease and Stage: IV prostate cancer  Treatment Site: T3-T6 and bilateral ribs  Current Dose/Planned Total Dose: 400 / 2000 cGy  Current Fraction/Planned Total fractions:   Concurrent Chemotherapy: cabazitaxel   Drug and Frequency: for dose 5      ED visits/Hospitalizations:  None    Missed Treatments:  None    Subjective: Mr. Reyes presents to clinic today for his weekly on-treatment visit. He is reporting minimal pain at the treatment site due to addition of Fentanyl.     Nursing ROS:   Nutrition Alteration  Diet Type: Patient's Preference  Skin  Skin Reaction: 0 - No changes  CNS Alteration  CNS note: Pt shared about an increase in tingling, particulary on the L side of his body from the buttocks down to his foot.  This tingling used to occur just for a minute when first lying down, but now it comes and goes throughout the night.     Cardiovascular  Respiratory effort: 1 - Normal - without distress  Gastrointestinal  Nausea: 0 - None  Diarrhea: 0 - None     Psychosocial  Mood - Anxiety: 0 - Normal  Pain Assessment  0-10 Pain Scale: 1  Pain Treatment: Pt states the added fentanyl patch to his pain regimen has made a huge difference in the severity of his pain.      Objective:   /70 (BP Location: Right arm)   Pulse 54   Resp 18   Wt 118 kg (260 lb 1.6 oz)   SpO2 97%   BMI 36.28 kg/m     KPS 80  Pain Score No Pain (1)/10  General: Alert and in no acute distress.  HEENT: Normocephalic, atraumatic. No visible scleral icterus.  Neck: Apparent full range of  motion.  CV: Appears well-perfused, with no visible cyanosis.  Lungs: Breathing easily on room air, with no difficulty completing full sentences  Extremities:  No visible edema of the upper extremities.   Neuro: Alert and oriented; grossly nonfocal. Normal speech. Moving upper and lower extremities equally.    Skin: No visible jaundice. No suspicious lesions of the visualized integuments.  Psych: Mood and affect are appropriate to given situation. Answers questions appropriately.        Treatment-related toxicities (CTCAE v5.0):  Fatigue: Grade 0: No toxicity  Pain: Grade 1: Mild pain  Dyspnea: Grade 0: No toxicity  Esophagitis: Grade 0: No toxicity    Assessment:    Mr. 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  Unfortunately, he has had increased pain primarily along the right posterior upper chest wall with imaging showing an area of metastatic osseous involvement of the ribs and adjacent increasing sclerosis of T4 and T5 vertebral bodies, with some extension into the left ribs at this level as well.  PSA was also noted to increase from 2.21 in 10/2023 to 4.38 this month.  Given symptomatic progression with PSA rise, his next dose of Pluvicto planned for this month was canceled.  He is receiving his next line of systemic therapy cabazitaxel and prednisone, to be delivered next on 12/19/2023.     It is noted that his treatment was delayed by the need for insurance approval to afford for IMRT, given large nature of the treatment field that abuts his prior treatment field as well. As such, his last dose of  radiation will be given on the date of his next infusion of cabazitaxel . I discussed this with Dr. Mcnair, who approved of concurrent treatment on the last day.    Plan:   Bone metastases:  Continue radiotherapy  Followup video visit in 6 weeks to assess response    Pain management:  No change indicated    Dermatitis:  Skin care reviewed    Mosaiq chart and setup information reviewed  MVCT images reviewed    Medication Review  Med list reviewed with patient?: Yes  Medication changes: No changes indicated per Patient.    Lexis Albert MD, MPH, PhD     Department of Radiation Oncology  [facility]       Again, thank you for allowing me to participate in the care of your patient.        Sincerely,        Lexis Albert

## 2023-12-14 ENCOUNTER — APPOINTMENT (OUTPATIENT)
Dept: RADIATION ONCOLOGY | Facility: CLINIC | Age: 63
End: 2023-12-14
Attending: RADIOLOGY
Payer: COMMERCIAL

## 2023-12-14 PROCEDURE — 77014 PR CT GUIDE FOR PLACEMENT RADIATION THERAPY FIELDS: CPT | Mod: 26 | Performed by: RADIOLOGY

## 2023-12-14 PROCEDURE — 77386 HC IMRT TREATMENT DELIVERY, COMPLEX: CPT | Performed by: RADIOLOGY

## 2023-12-15 ENCOUNTER — APPOINTMENT (OUTPATIENT)
Dept: RADIATION ONCOLOGY | Facility: CLINIC | Age: 63
End: 2023-12-15
Attending: RADIOLOGY
Payer: COMMERCIAL

## 2023-12-15 PROCEDURE — 77014 PR CT GUIDE FOR PLACEMENT RADIATION THERAPY FIELDS: CPT | Mod: 26 | Performed by: RADIOLOGY

## 2023-12-15 PROCEDURE — 77386 HC IMRT TREATMENT DELIVERY, COMPLEX: CPT | Performed by: RADIOLOGY

## 2023-12-18 ENCOUNTER — APPOINTMENT (OUTPATIENT)
Dept: RADIATION ONCOLOGY | Facility: CLINIC | Age: 63
End: 2023-12-18
Attending: RADIOLOGY
Payer: COMMERCIAL

## 2023-12-18 PROCEDURE — 77386 HC IMRT TREATMENT DELIVERY, COMPLEX: CPT | Performed by: RADIOLOGY

## 2023-12-18 PROCEDURE — 77014 PR CT GUIDE FOR PLACEMENT RADIATION THERAPY FIELDS: CPT | Mod: 26 | Performed by: RADIOLOGY

## 2023-12-19 ENCOUNTER — APPOINTMENT (OUTPATIENT)
Dept: RADIATION ONCOLOGY | Facility: CLINIC | Age: 63
End: 2023-12-19
Attending: RADIOLOGY
Payer: COMMERCIAL

## 2023-12-19 ENCOUNTER — INFUSION THERAPY VISIT (OUTPATIENT)
Dept: ONCOLOGY | Facility: CLINIC | Age: 63
End: 2023-12-19
Attending: STUDENT IN AN ORGANIZED HEALTH CARE EDUCATION/TRAINING PROGRAM
Payer: COMMERCIAL

## 2023-12-19 ENCOUNTER — LAB (OUTPATIENT)
Dept: LAB | Facility: CLINIC | Age: 63
End: 2023-12-19
Attending: FAMILY MEDICINE
Payer: COMMERCIAL

## 2023-12-19 VITALS
DIASTOLIC BLOOD PRESSURE: 75 MMHG | BODY MASS INDEX: 36.4 KG/M2 | RESPIRATION RATE: 16 BRPM | OXYGEN SATURATION: 97 % | SYSTOLIC BLOOD PRESSURE: 104 MMHG | WEIGHT: 261 LBS | TEMPERATURE: 98.1 F | HEART RATE: 66 BPM

## 2023-12-19 DIAGNOSIS — R11.0 NAUSEA: ICD-10-CM

## 2023-12-19 DIAGNOSIS — C61 MALIGNANT NEOPLASM OF PROSTATE METASTATIC TO BONE (H): ICD-10-CM

## 2023-12-19 DIAGNOSIS — C61 MALIGNANT NEOPLASM OF PROSTATE METASTATIC TO BONE (H): Primary | ICD-10-CM

## 2023-12-19 DIAGNOSIS — G62.0 CHEMOTHERAPY-INDUCED PERIPHERAL NEUROPATHY (H): ICD-10-CM

## 2023-12-19 DIAGNOSIS — C79.51 MALIGNANT NEOPLASM OF PROSTATE METASTATIC TO BONE (H): Primary | ICD-10-CM

## 2023-12-19 DIAGNOSIS — T45.1X5A CHEMOTHERAPY-INDUCED PERIPHERAL NEUROPATHY (H): ICD-10-CM

## 2023-12-19 DIAGNOSIS — M54.10 RADICULOPATHY, UNSPECIFIED SPINAL REGION: ICD-10-CM

## 2023-12-19 DIAGNOSIS — C79.51 MALIGNANT NEOPLASM OF PROSTATE METASTATIC TO BONE (H): ICD-10-CM

## 2023-12-19 LAB
ALBUMIN SERPL BCG-MCNC: 3.9 G/DL (ref 3.5–5.2)
ALP SERPL-CCNC: 210 U/L (ref 40–150)
ALT SERPL W P-5'-P-CCNC: 13 U/L (ref 0–70)
ANION GAP SERPL CALCULATED.3IONS-SCNC: 8 MMOL/L (ref 7–15)
AST SERPL W P-5'-P-CCNC: 19 U/L (ref 0–45)
BASOPHILS # BLD AUTO: 0 10E3/UL (ref 0–0.2)
BASOPHILS NFR BLD AUTO: 0 %
BILIRUB SERPL-MCNC: 0.3 MG/DL
BUN SERPL-MCNC: 12.8 MG/DL (ref 8–23)
CALCIUM SERPL-MCNC: 8.6 MG/DL (ref 8.8–10.2)
CHLORIDE SERPL-SCNC: 107 MMOL/L (ref 98–107)
CREAT SERPL-MCNC: 0.88 MG/DL (ref 0.67–1.17)
DEPRECATED HCO3 PLAS-SCNC: 25 MMOL/L (ref 22–29)
EGFRCR SERPLBLD CKD-EPI 2021: >90 ML/MIN/1.73M2
EOSINOPHIL # BLD AUTO: 0.1 10E3/UL (ref 0–0.7)
EOSINOPHIL NFR BLD AUTO: 2 %
ERYTHROCYTE [DISTWIDTH] IN BLOOD BY AUTOMATED COUNT: 15 % (ref 10–15)
GLUCOSE SERPL-MCNC: 108 MG/DL (ref 70–99)
HCT VFR BLD AUTO: 33.6 % (ref 40–53)
HGB BLD-MCNC: 11.2 G/DL (ref 13.3–17.7)
IMM GRANULOCYTES # BLD: 0 10E3/UL
IMM GRANULOCYTES NFR BLD: 1 %
LYMPHOCYTES # BLD AUTO: 0.6 10E3/UL (ref 0.8–5.3)
LYMPHOCYTES NFR BLD AUTO: 19 %
MCH RBC QN AUTO: 29.4 PG (ref 26.5–33)
MCHC RBC AUTO-ENTMCNC: 33.3 G/DL (ref 31.5–36.5)
MCV RBC AUTO: 88 FL (ref 78–100)
MONOCYTES # BLD AUTO: 0.3 10E3/UL (ref 0–1.3)
MONOCYTES NFR BLD AUTO: 9 %
NEUTROPHILS # BLD AUTO: 2.2 10E3/UL (ref 1.6–8.3)
NEUTROPHILS NFR BLD AUTO: 69 %
NRBC # BLD AUTO: 0 10E3/UL
NRBC BLD AUTO-RTO: 0 /100
PLATELET # BLD AUTO: 171 10E3/UL (ref 150–450)
POTASSIUM SERPL-SCNC: 4 MMOL/L (ref 3.4–5.3)
PROT SERPL-MCNC: 6.5 G/DL (ref 6.4–8.3)
PSA SERPL DL<=0.01 NG/ML-MCNC: 3.66 NG/ML (ref 0–4.5)
RBC # BLD AUTO: 3.81 10E6/UL (ref 4.4–5.9)
SODIUM SERPL-SCNC: 140 MMOL/L (ref 135–145)
WBC # BLD AUTO: 3.2 10E3/UL (ref 4–11)

## 2023-12-19 PROCEDURE — 36415 COLL VENOUS BLD VENIPUNCTURE: CPT

## 2023-12-19 PROCEDURE — 96375 TX/PRO/DX INJ NEW DRUG ADDON: CPT

## 2023-12-19 PROCEDURE — 258N000003 HC RX IP 258 OP 636: Performed by: NURSE PRACTITIONER

## 2023-12-19 PROCEDURE — 99215 OFFICE O/P EST HI 40 MIN: CPT

## 2023-12-19 PROCEDURE — 77014 PR CT GUIDE FOR PLACEMENT RADIATION THERAPY FIELDS: CPT | Mod: 26 | Performed by: RADIOLOGY

## 2023-12-19 PROCEDURE — 84153 ASSAY OF PSA TOTAL: CPT

## 2023-12-19 PROCEDURE — 99214 OFFICE O/P EST MOD 30 MIN: CPT

## 2023-12-19 PROCEDURE — 85025 COMPLETE CBC W/AUTO DIFF WBC: CPT

## 2023-12-19 PROCEDURE — 77336 RADIATION PHYSICS CONSULT: CPT | Performed by: RADIOLOGY

## 2023-12-19 PROCEDURE — 250N000011 HC RX IP 250 OP 636: Performed by: NURSE PRACTITIONER

## 2023-12-19 PROCEDURE — 77386 HC IMRT TREATMENT DELIVERY, COMPLEX: CPT | Performed by: RADIOLOGY

## 2023-12-19 PROCEDURE — 36591 DRAW BLOOD OFF VENOUS DEVICE: CPT

## 2023-12-19 PROCEDURE — 77427 RADIATION TX MANAGEMENT X5: CPT | Performed by: RADIOLOGY

## 2023-12-19 PROCEDURE — 84403 ASSAY OF TOTAL TESTOSTERONE: CPT

## 2023-12-19 PROCEDURE — 84155 ASSAY OF PROTEIN SERUM: CPT

## 2023-12-19 PROCEDURE — 96413 CHEMO IV INFUSION 1 HR: CPT

## 2023-12-19 RX ORDER — HEPARIN SODIUM (PORCINE) LOCK FLUSH IV SOLN 100 UNIT/ML 100 UNIT/ML
5 SOLUTION INTRAVENOUS
Status: CANCELLED | OUTPATIENT
Start: 2023-12-19

## 2023-12-19 RX ORDER — ONDANSETRON 2 MG/ML
8 INJECTION INTRAMUSCULAR; INTRAVENOUS ONCE
Status: COMPLETED | OUTPATIENT
Start: 2023-12-19 | End: 2023-12-19

## 2023-12-19 RX ORDER — ALBUTEROL SULFATE 90 UG/1
1-2 AEROSOL, METERED RESPIRATORY (INHALATION)
Status: CANCELLED
Start: 2023-12-19

## 2023-12-19 RX ORDER — ONDANSETRON 2 MG/ML
8 INJECTION INTRAMUSCULAR; INTRAVENOUS ONCE
Status: CANCELLED | OUTPATIENT
Start: 2023-12-19

## 2023-12-19 RX ORDER — DIPHENHYDRAMINE HYDROCHLORIDE 50 MG/ML
50 INJECTION INTRAMUSCULAR; INTRAVENOUS
Status: CANCELLED
Start: 2023-12-19

## 2023-12-19 RX ORDER — EPINEPHRINE 1 MG/ML
0.3 INJECTION, SOLUTION INTRAMUSCULAR; SUBCUTANEOUS EVERY 5 MIN PRN
Status: CANCELLED | OUTPATIENT
Start: 2023-12-19

## 2023-12-19 RX ORDER — MEPERIDINE HYDROCHLORIDE 25 MG/ML
25 INJECTION INTRAMUSCULAR; INTRAVENOUS; SUBCUTANEOUS EVERY 30 MIN PRN
Status: CANCELLED | OUTPATIENT
Start: 2023-12-19

## 2023-12-19 RX ORDER — METHYLPREDNISOLONE SODIUM SUCCINATE 125 MG/2ML
125 INJECTION, POWDER, LYOPHILIZED, FOR SOLUTION INTRAMUSCULAR; INTRAVENOUS
Status: CANCELLED
Start: 2023-12-19

## 2023-12-19 RX ORDER — HEPARIN SODIUM (PORCINE) LOCK FLUSH IV SOLN 100 UNIT/ML 100 UNIT/ML
5 SOLUTION INTRAVENOUS
Status: DISCONTINUED | OUTPATIENT
Start: 2023-12-19 | End: 2023-12-19 | Stop reason: HOSPADM

## 2023-12-19 RX ORDER — HEPARIN SODIUM,PORCINE 10 UNIT/ML
5-20 VIAL (ML) INTRAVENOUS DAILY PRN
Status: CANCELLED | OUTPATIENT
Start: 2023-12-19

## 2023-12-19 RX ORDER — PREDNISONE 10 MG/1
10 TABLET ORAL DAILY
Qty: 21 TABLET | Refills: 0 | Status: SHIPPED | OUTPATIENT
Start: 2023-12-19 | End: 2024-01-09

## 2023-12-19 RX ORDER — ALBUTEROL SULFATE 0.83 MG/ML
2.5 SOLUTION RESPIRATORY (INHALATION)
Status: CANCELLED | OUTPATIENT
Start: 2023-12-19

## 2023-12-19 RX ORDER — LORAZEPAM 2 MG/ML
0.5 INJECTION INTRAMUSCULAR EVERY 4 HOURS PRN
Status: CANCELLED | OUTPATIENT
Start: 2023-12-19

## 2023-12-19 RX ADMIN — SODIUM CHLORIDE 250 ML: 9 INJECTION, SOLUTION INTRAVENOUS at 10:45

## 2023-12-19 RX ADMIN — Medication 5 ML: at 12:38

## 2023-12-19 RX ADMIN — ONDANSETRON 8 MG: 2 INJECTION INTRAMUSCULAR; INTRAVENOUS at 10:46

## 2023-12-19 RX ADMIN — FAMOTIDINE 20 MG: 10 INJECTION, SOLUTION INTRAVENOUS at 10:49

## 2023-12-19 RX ADMIN — DIPHENHYDRAMINE HYDROCHLORIDE 25 MG: 50 INJECTION, SOLUTION INTRAMUSCULAR; INTRAVENOUS at 10:50

## 2023-12-19 RX ADMIN — SODIUM CHLORIDE 50 MG: 9 INJECTION, SOLUTION INTRAVENOUS at 11:32

## 2023-12-19 ASSESSMENT — PAIN SCALES - GENERAL: PAINLEVEL: NO PAIN (1)

## 2023-12-19 NOTE — PATIENT INSTRUCTIONS
[facility] Triage and after hours / weekends / holidays:  [phone]    Please call the triage or after hours line if you experience a temperature greater than or equal to 100.4, shaking chills, have uncontrolled nausea, vomiting and/or diarrhea, dizziness, shortness of breath, chest pain, bleeding, unexplained bruising, or if you have any other new/concerning symptoms, questions or concerns.      If you are having any concerning symptoms or wish to speak to a provider before your next infusion visit, please call triage to notify them so we can adequately serve you.     If you need a refill on a narcotic prescription or other medication, please call before your infusion appointment.

## 2023-12-19 NOTE — Clinical Note
12/19/2023         RE: Walter Reyes  [id number] [facility] [id number]        Dear Colleague,    Thank you for referring your patient, Walter Reyes, to the [facility]. Please see a copy of my visit note below.        [facility] Oncology Followup  Dec 19, 2023    REASON FOR VISIT: Follow-up for metastatic castration-resistant prostate cancer.        INTERVAL HISTORY:   Finished radiation to ribs today. Occasional pain to forehead, femur and ribs but all much improved since starting fentanyl patch.    Fatigue is unchanged. Some days are better than others, but overall energy is low. Felt fatigued after first dose of cabazitaxel.     Had nausea for 1 week following first dose of cabazitaxel. Alternated Compazine and Zofran with good control over nausea. Bowels fluctuate between constipation and a few episodes of diarrhea.     Neuropathy at baseine prior to this chemo, primarliy in right foot, both feet, numb with pinching pain. Pins and needles when ay down at night. From rear end down to feet on left side. Maybe gotten a ilttle better recently. Lasts about a minute before it settles down.      Blood pressure lower in clinic today. Reports he stopped taking antihypertensives completely, monitors BP at home. Most of the time readings are 120's or greater. Occasional readings are lower, with a couple under 100 systolic. He feels weaker with low readings, but denies feeling dizzy or lightheaded.         ONCOLOGY HISTORY: Mr. Walter Reyes is a 63 year old gentleman with a metastatic castration-sensitive prostate cancer and incidentally diagnosed stage 3 clear cell RCC (s/p radical R nephrectomy on 3/19/19) which is now 4.5+ years post-therapy without evidence of recurrence. His oncologic history is detailed below.     1. De deja metastatic prostate adenocarcinoma, stage IV (M1b at diagnosis), high-volume, castration-sensitive:  - 12/13/2018:  "PSA found to be elevated to 9.1 ng/mL on a routine follow-up with primary care provider Dr. Naylor at [facility]. Prior PSA were 1.4 on 8/16/17, 2.4 on 6/28/16, 2.9 on 6/28/16, and as low as 0.4 on 3/26/2003.   - 1/08/2019: Consultation with Sarah Wilson CNP in Urology clinic. Repeat PSA 9.6.  - 1/16/2019: MRI prostate with contrast - \"This examination is characterized as PIRADS 5- very high probability. Clinically significant cancer is highly likely to be present. There is a large, invasive mass arising from the right peripheral zone and extending into the neurovascular bundle, seminal vesicle, and along the anterolateral right mesorectal fascia. Metastatic right external iliac lymph node. Metastatic lesion in the posterior/superior right acetabulum.\"  - 1/25/2019: CT abdomen and pelvis with IV contrast - \"1. Heterogeneous enhancing mass posteriorly in the upper pole of the right kidney measures 4.5 x 5.8 x 5.7 cm (AP by transverse by craniocaudal). It has a small nodular component extending posterior medially which abuts the right psoas muscle. This nodular extension measures 2.1 x 2.1 cm. Minimal stranding about the mass. No definite thrombus within the right renal vein. This renal mass is compatible with a renal cell carcinoma. A paraaortic lymph node situated immediately posterior to the left renal vein measures 1.1 cm in short axis, suspicious for metastasis. 2. A 2 cm right external iliac lymph node is also suspicious for metastases. 3. Multiple sclerotic osseous lesions suspicious for metastases. These include 0.8 and 0.6 cm sclerotic lesions laterally in the right iliac wing (images 53 and 62 respectively). Ill-defined groundglass density laterally in the right acetabulum measuring 1.7 x 1.2 cm corresponds with the lesion identified on MRI. A 0.4 cm groundglass density in the left acetabulum. Sclerotic lesion in the left femoral neck measures 0.9 x 1.6 cm (image 81). Sclerotic metastases would be " "more compatible with prostate metastases. 4. A 3 subcentimeter hepatic lesions are indeterminate. Metastases would be a consideration. These can be further characterized with liver MRI.\"  - 1/25/2019: NM bone scan - \"There is focal bony uptake in the left femoral neck, right acetabulum, right of midline at the S1 or L5 level of the spine, within multiple bilateral ribs, in the C7 or T1 level of the spine, and anteriorly within the skull. Findings are suspicious for metastases.\"  - 1/31/19: CT chest with contrast - \" No suspicious nodules in the chest.  Stable appearance of a 5.8 cm right renal mass concerning for renal carcinoma until proven otherwise.  Stable indeterminant subcentimeter hypodensities in the liver.  Multifocal osteoblastic metastasis including 2.5 cm lesion in the right fifth rib posteriorly and a 1.6 cm lesion in the right third rib posteriorly. No lytic lesions identified.\"  - 2/6/19: CT-guided right sclerotic fifth rib lesion biopsy - \"Metastatic carcinoma, consistent with prostate primary.  Immunohistochemical stains performed show the metastatic carcinoma stains positive for NKX3.1 (prostate marker), negative for STACIE 3 and PAX8, which supports the above diagnosis.\"  - 2/15/19: Started Casodex 50mg every day and consented for the biobanking protocol. 3/18/19 - stopped Casodex.  - 2/19/19: Case discussed in tumor board - recommendation for nephectomy for suspected malignant right renal mass.   - 2/26/19: Started Lupron 22.5mg every 3 months.   - 5/8/19: Started Docetaxel 75mg/m2 IV every 3 weeks. 06/18/19 - cycle 3, 7/10/19 - cycle 4, 07/31/19 - cycle 5, 08/21/19 - cycle 6.   - 10/2/19: Restaging CT CAP and NM Bone Scan showed improved osseous disease, no evidence of recurrent or new metastatic disease.  - 1/5/20: Restaging CT CAP and NM Bone Scan showed stable osseous disease, no evidence of recurrent or new metastatic disease.  - 4/6/20: NM bone scan with slightly improved uptake in known " "skeletal mets. CT C/A/P with contrast with stable osseous mets, no yusuf enlargement, no new visceral mets etc.  - 04/08/20: Zometa every 3 months.  - 10/5/20: CT C/A/P with contrast and NM bone scan - SD.   - 1/4/21: CT C/A/P with contrast and NM bone scan - SD but slight increase in right 5th rib tracer uptake and in posterior L5 sclerosis. 1/6/21  PSA = 0.29  - 03/29/21: CT C/A/P with contrast - single new right sacral 8mm bone lesion (suspicious), other bone mets stable. No visceral/yusuf disease. Nephrectomy site without recurrence. NM bone scan - SD but \"increased uptake associated with the prior lesions of the lower  cervical spine, posterior right fourth rib and right L5 posterior arch elements. Otherwise unchanged uptake associated with the proximal left femur and left anterior fourth rib. Similar uptake of the left halux, possibly secondary to degenerative osteoarthritis or gout.\"    3/31/21 PSA = 0.44  7/7/21  PSA = 0.47  11/2021 PSA = 1.05  -- Start XTANDI  12/16/21 PSA =0.22  2/11/22 PSA= 0.50  3/22/22 PSA=0.72  5/5/2022 PSA=1.79  7/11/2022 PSA=2.16 --Start cycle 1 docetaxel   8/22/22 PSA = 1.36  9/12/22 PSA = 1.09  10/24/22 Cycle #6 of Docetaxel. End of treatment  1/9/23  PSA =0.66  3/20/23  PSA=1.54  4/21/23 PSA = 1.81  T=15  5/22/23 C1 Pluvicto   06/07/23          PSA = 1.42  7/18/23 PSA=1.75, C2 Pluvicto   8/28/23 Transfer of care initial visit for Tabby.  PSA 2.06.  C3 Pluvicto scheduled for 8/30.  Proceed with dose as planned.  MR DELORIS femur, ortho referral, PSMA PET ordered for prior to follow-up.    10/2/23 Palliative RT to L femur metastasis complete.  PSMA PET with mixed response.  PSA 2.21.  RT to L femur  pending.  Continue with Dose #4 of Pluvicto despite mixed response to therapy.  Dex course for possible pain   Flare with RT.    11/8/23  Follow-up prior to Dose #5 Pluvicto.  More pain in chest/ribs/back. PSA 4.38.  Testosterone=3.  Rad onc referral for ribs.  Biopsy progressive lesion for " "    Progression on Pluvicto.  Cabazitaxel scheduled for follow-up appointment.  Pending Tempus testing on biopsy sample versus peripheral blood.   11/27/23 Bx completed from R iliac crest but unrevealing for either PCa or RCC.  Start cabazitaxel C1D1.  PSA 2.81.        Radiation history:   C7         3,000 cGy       06 X      8/05/2021        8/18/2021        13       10  2 Sacrum          2,500 cGy       18 X      4/12/2022        4/18/2022         6           Sacrum retreat               700 cGy        18 X      2/28/2023        2/28/2023    L femur Pending for October 2023.   ***     2. Stage III ([id number]), grade 3 of 4, clear-cell RCC of right kidney:  - Incidentally diagnosed as above.   - Underwent curative-intent robotic right radical nephrectomy with Dr. Wesley Cleveland on 3/19/19. No tumor spillage per op report.   - Path showed: \"Histologic Type: Clear cell renal cell carcinoma; Sarcomatoid Features: Not identified; Histologic Grade: Nucleolar grade 3 (WHO/ISUP). Extent Tumor Size: 4.3 cm. Microscopic Tumor Extension: Tumor extension into renal sinus (in vascular structures). Margins: Negative. Tumor Necrosis: Present; focal. Lymph-Vascular Invasion: Not identified.  Pathologic Staging (pTNM) Primary Tumor (pT): pT3a: Tumor extends into renal vein branches/renal sinus. Regional Lymph Nodes (pN): pNX. Number of Lymph Nodes Examined: 0 Distant Metastasis (pM): pM N/A.\"  - Restaging scans as above.  No current concerns for recurrence.      PAST MEDICAL HISTORY:  Past Medical History:   Diagnosis Date    Cancer of kidney, right (H)     Complication of anesthesia     slow wakeup     Malignant neoplasm of prostate metastatic to bone (H) 2/14/2019    Thyroid disease      CURRENT MEDICATIONS:   Current Outpatient Medications:     atorvastatin (LIPITOR) 20 MG tablet, Take 60 mg by mouth daily , Disp: , Rfl:     calcium citrate-vitamin D (CITRACAL) 315-250 MG-UNIT TABS per tablet, Take 650 mg by mouth 2 times daily " , Disp: , Rfl:     cycloSPORINE (RESTASIS) 0.05 % ophthalmic emulsion, Place 1 drop into both eyes 2 times daily , Disp: , Rfl:     dexAMETHasone (DECADRON) 4 MG tablet, Take 1 tablet (4 mg) by mouth daily, Disp: 7 tablet, Rfl: 2    fentaNYL (DURAGESIC) 25 mcg/hr 72 hr patch, Place 1 patch onto the skin every 72 hours remove old patch., Disp: 10 patch, Rfl: 0    gabapentin (NEURONTIN) 300 MG capsule, TAKE ONE CAPSULE BY MOUTH TWICE DAILY AND 2 CAPSULES AT BEDTIME, Disp: 120 capsule, Rfl: 3    levothyroxine (SYNTHROID/LEVOTHROID) 112 MCG tablet, Take 112 mcg by mouth daily, Disp: , Rfl:     Multiple Vitamins-Minerals (MULTIVITAMIN ADULT EXTRA C PO), Take 1 tablet by mouth every 24 hours, Disp: , Rfl:     Nutritional Supplements (SALMON OIL) CAPS, Take 2 capsules by mouth daily , Disp: , Rfl:     omeprazole (PRILOSEC) 20 MG DR capsule, Take 20 mg by mouth daily, Disp: , Rfl:     ondansetron (ZOFRAN) 8 MG tablet, Take 1 tablet (8 mg) by mouth every 8 hours as needed for nausea, Disp: 30 tablet, Rfl: 4    oxyCODONE (ROXICODONE) 5 MG tablet, Take 1-2 tablets (5-10 mg) by mouth every 3 hours as needed for severe pain, Disp: 90 tablet, Rfl: 0    prochlorperazine (COMPAZINE) 10 MG tablet, Take 1 tablet (10 mg) by mouth every 6 hours as needed for nausea or vomiting, Disp: 90 tablet, Rfl: 1    tamsulosin (FLOMAX) 0.4 MG capsule, Take 1 capsule (0.4 mg) by mouth daily, Disp: 30 capsule, Rfl: 3    buprenorphine (BUTRANS) 20 MCG/HR WK patch, Place 1 patch onto the skin once a week (Patient not taking: Reported on 12/19/2023), Disp: , Rfl:     Glucosamine-Chondroit-Vit C-Mn (GLUCOSAMINE 1500 COMPLEX) CAPS, Take 1 capsule by mouth daily (Patient not taking: Reported on 12/19/2023), Disp: , Rfl:     loratadine (CLARITIN) 10 MG tablet, Take 10 mg by mouth daily (Patient not taking: Reported on 12/19/2023), Disp: , Rfl:     naloxone (NARCAN) 4 MG/0.1ML nasal spray, Spray 1 spray (4 mg) into one nostril alternating nostrils as needed  for opioid reversal every 2-3 minutes until assistance arrives (Patient not taking: Reported on 12/19/2023), Disp: 0.2 mL, Rfl: 1    prochlorperazine (COMPAZINE) 10 MG tablet, Take 1 tablet (10 mg) by mouth every 6 hours as needed for nausea or vomiting, Disp: 30 tablet, Rfl: 2    triamterene-HCTZ (MAXZIDE-25) 37.5-25 MG tablet, Take 1 tablet by mouth daily (Patient not taking: Reported on 12/19/2023), Disp: , Rfl:       Physical Exam:   /75   Pulse 66   Temp 98.1  F (36.7  C) (Oral)   Resp 16   Wt 118.4 kg (261 lb)   SpO2 97%   BMI 36.40 kg/m    General: Patient appears well in no acute distress.   Skin: No visualized rash or lesions on visualized skin  Eyes: EOMI, no erythema, sclera icterus or discharge noted  Resp: Appears to be breathing comfortably without accessory muscle usage, speaking in full sentences, no cough  MSK: Appears to have normal range of motion based on visualized movements  Neurologic: No apparent tremors, facial movements symmetric  Psych: affect bright, alert and oriented    ECOG PS 1.    LABORATORY DATA:  Most Recent 3 CBC's:  Recent Labs   Lab Test 11/27/23  1207 11/08/23  0856 10/10/23  1336   WBC 5.9 7.7 7.3   HGB 11.0* 10.9* 12.2*   MCV 89 88 91    226 249   ANEUTAUTO 4.0 6.4 5.0     Most Recent 3 BMP's:  Recent Labs   Lab Test 11/27/23  1207 11/08/23  0856 10/10/23  1336    139 139   POTASSIUM 3.6 4.1 3.6   CHLORIDE 107 107 103   CO2 24 22 24   BUN 17.6 16.0 23.1*   CR 1.02 0.86 0.99   ANIONGAP 10 10 12   BETHANY 8.8 9.2 9.0   * 118* 118*   PROTTOTAL 6.7 7.1 6.5   ALBUMIN 3.8 4.0 3.9    Most Recent 3 LFT's:  Recent Labs   Lab Test 11/27/23  1207 11/08/23  0856 10/10/23  1336   AST 22 31 19   ALT 15 16 23   ALKPHOS 246* 177* 218*   BILITOTAL 0.2 0.2 0.3       I reviewed the above labs today.    PSA trend, see oncology history.      R iliac crest biopsy from 11/21/23 without evidence for malignancy.          ASSESSMENT & PLAN: Mr. Reyes is a  "delightful 63 year old gentleman with metastatic castration sensitive prostate adenocarcinoma as well as an incidentally diagnosed stage III clear-cell renal cell carcinoma of the right kidney (s/p radical R nephrectomy 3/19/19), who is here for a follow-up visit and initiation of docetafor now metastatic castration-resistant prostate cancer.     1. Metastatic castration-resistant prostate cancer, with involvement of the bones and RPLN:   - Patient met the criteria for CHAARTED \"high-volume\" metastatic hormone-sensitive prostate cancer. He opted for docetaxel in combination with ADT (per JOSEFA, [id number], STAMPEDE). He was subsequently treated with docetaxel x6 doses in the [facility] setting and enzalutamide.  He initiated Pluvicto in May 2023 with an initial slight decline in PSA, but this began to rise just prior to Cycle 3.  Given his XR evidence of progression in L femur and PSA rise, we re-evaluated his progression with PSMA PET scan in September 2023, with note of mixed response.  Reconsented for  [employer] on 8/28/23.  Due for 3 month collection at end of November 2023.    -Zometa next due 1/24/2024 along with Lupron.   -Palliative to manage further opiate refills.   -Dexamethasone burst for RT and one on hand if needed for pain flares.   Let Oncology team know if this is used.  Refills sent 11/9/23.    -Canceled Pluvicto Dose for November due to symptomatic progression with PSA rise.    -PSA is labile  -Biopsy results from 11/21 are pending for evaluation of NEPC vs small cell given progression without a significant rise in PSA.  Will also send peripheral blood Tempus for evaluation of targetable mutations given the negative result from the 11/21/23 bone biopsy.    -Cabazitaxel started 11/27/23 given progression on Pluvicto. Tolerated ***        2. Bone metastases:   - Noted to have osseous metastatic disease at the time of diagnosis. S/p radiation to C spine and sacrum (Re-irradiation to sacrum).   - " worsening radiculopathy down starting in the low back and radiating down the left leg in June. MRI read was without new/acute abnormalities  - Pain continues to fluctuate but overall well controlled/managable   - pain mgmt per palliative care, on butrans patch now with addition of dilaudid.    - Encouraged to continue calcium and vitamin D supplementation; continue Zometa 4mg IV every ~3 months. Due next in November 2023. ***  - RT to L femur 10/2023.  Pending RT for R sided ribs 12/2023 with Dr. Albert between C1 and C2 of cabazitaxel. ***   - Added to trial list for GAF279, [id number].       3. Stage 3, UISS-high risk ccRCC: s/p R nephrectomy   - Nephrectomy 3/19/19 and noted incidentally.  He is now 4.5 years post-op without evidence for recurrence.   - At this point, there is a minimal risk of recurrence of his RCC given the time since surgery without the use of adjuvant immunotherapy. There is no suspicious adenopathy or other features on his most recent imaging studies.    - Will continue to monitor with imaging planned for prostate cancer.      PLAN:   1. C1D1 Cabazitaxel today.   2. Biopsy unrevealing for malignancy.    3. Will request Tempus blood circulating tumor DNA evaluation for targetable mutations.        *** minutes spent on the date of the encounter doing {2021 E&M time in:[id number]}     LATESHA Humphreys [facility] Oncology Followup  Dec 19, 2023    REASON FOR VISIT: Follow-up for metastatic castration-resistant prostate cancer.        INTERVAL HISTORY:   Finished radiation to ribs today. Occasional pain to forehead, femur and ribs but all much improved since starting fentanyl patch.    Fatigue is unchanged. Some days are better than others, but overall energy is low. Felt fatigued after first dose of cabazitaxel.     Had nausea for 1 week following first dose of cabazitaxel. Alternated Compazine and Zofran with good control over nausea. Bowels fluctuate between constipation and a  "few episodes of diarrhea.     Had neuropathy at baseline from prior chemo. Primarily in right foot, but both feet are numb with pinching pain. Left leg has positional sensation of pins and needles from hip all the way to toes when he lays down on left side. Maybe gotten a ilttle better recently. Previously it was lasting ~1 minute, now pins and needles resolve after ~15-20 seconds. Has discussed with Dr. Albert and continues to closely monitor.      Blood pressure lower in clinic today. Reports he stopped taking antihypertensives completely, monitors BP at home. Most of the time readings are 120's or greater. Occasional readings are lower, with a couple under 100 systolic. He feels weaker with low readings, but denies feeling dizzy or lightheaded.         ONCOLOGY HISTORY: Mr. Walter Reyes is a 63 year old gentleman with a metastatic castration-sensitive prostate cancer and incidentally diagnosed stage 3 clear cell RCC (s/p radical R nephrectomy on 3/19/19) which is now 4.5+ years post-therapy without evidence of recurrence. His oncologic history is detailed below.     1. 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 Naylor at [facility]. Prior PSA were 1.4 on 8/16/17, 2.4 on 6/28/16, 2.9 on 6/28/16, and as low as 0.4 on 3/26/2003.   - 1/08/2019: Consultation with Sarah Wilson CNP in Urology clinic. Repeat PSA 9.6.  - 1/16/2019: MRI prostate with contrast - \"This examination is characterized as PIRADS 5- very high probability. Clinically significant cancer is highly likely to be present. There is a large, invasive mass arising from the right peripheral zone and extending into the neurovascular bundle, seminal vesicle, and along the anterolateral right mesorectal fascia. Metastatic right external iliac lymph node. Metastatic lesion " "in the posterior/superior right acetabulum.\"  - 1/25/2019: CT abdomen and pelvis with IV contrast - \"1. Heterogeneous enhancing mass posteriorly in the upper pole of the right kidney measures 4.5 x 5.8 x 5.7 cm (AP by transverse by craniocaudal). It has a small nodular component extending posterior medially which abuts the right psoas muscle. This nodular extension measures 2.1 x 2.1 cm. Minimal stranding about the mass. No definite thrombus within the right renal vein. This renal mass is compatible with a renal cell carcinoma. A paraaortic lymph node situated immediately posterior to the left renal vein measures 1.1 cm in short axis, suspicious for metastasis. 2. A 2 cm right external iliac lymph node is also suspicious for metastases. 3. Multiple sclerotic osseous lesions suspicious for metastases. These include 0.8 and 0.6 cm sclerotic lesions laterally in the right iliac wing (images 53 and 62 respectively). Ill-defined groundglass density laterally in the right acetabulum measuring 1.7 x 1.2 cm corresponds with the lesion identified on MRI. A 0.4 cm groundglass density in the left acetabulum. Sclerotic lesion in the left femoral neck measures 0.9 x 1.6 cm (image 81). Sclerotic metastases would be more compatible with prostate metastases. 4. A 3 subcentimeter hepatic lesions are indeterminate. Metastases would be a consideration. These can be further characterized with liver MRI.\"  - 1/25/2019: NM bone scan - \"There is focal bony uptake in the left femoral neck, right acetabulum, right of midline at the S1 or L5 level of the spine, within multiple bilateral ribs, in the C7 or T1 level of the spine, and anteriorly within the skull. Findings are suspicious for metastases.\"  - 1/31/19: CT chest with contrast - \" No suspicious nodules in the chest.  Stable appearance of a 5.8 cm right renal mass concerning for renal carcinoma until proven otherwise.  Stable indeterminant subcentimeter hypodensities in the liver.  " "Multifocal osteoblastic metastasis including 2.5 cm lesion in the right fifth rib posteriorly and a 1.6 cm lesion in the right third rib posteriorly. No lytic lesions identified.\"  - 2/6/19: CT-guided right sclerotic fifth rib lesion biopsy - \"Metastatic carcinoma, consistent with prostate primary.  Immunohistochemical stains performed show the metastatic carcinoma stains positive for NKX3.1 (prostate marker), negative for STACIE 3 and PAX8, which supports the above diagnosis.\"  - 2/15/19: Started Casodex 50mg every day and consented for the biobanking protocol. 3/18/19 - stopped Casodex.  - 2/19/19: Case discussed in tumor board - recommendation for nephectomy for suspected malignant right renal mass.   - 2/26/19: Started Lupron 22.5mg every 3 months.   - 5/8/19: Started Docetaxel 75mg/m2 IV every 3 weeks. 06/18/19 - cycle 3, 7/10/19 - cycle 4, 07/31/19 - cycle 5, 08/21/19 - cycle 6.   - 10/2/19: Restaging CT CAP and NM Bone Scan showed improved osseous disease, no evidence of recurrent or new metastatic disease.  - 1/5/20: Restaging CT CAP and NM Bone Scan showed stable osseous disease, no evidence of recurrent or new metastatic disease.  - 4/6/20: NM bone scan with slightly improved uptake in known skeletal mets. CT C/A/P with contrast with stable osseous mets, no yusuf enlargement, no new visceral mets etc.  - 04/08/20: Zometa every 3 months.  - 10/5/20: CT C/A/P with contrast and NM bone scan - SD.   - 1/4/21: CT C/A/P with contrast and NM bone scan - SD but slight increase in right 5th rib tracer uptake and in posterior L5 sclerosis. 1/6/21  PSA = 0.29  - 03/29/21: CT C/A/P with contrast - single new right sacral 8mm bone lesion (suspicious), other bone mets stable. No visceral/yusuf disease. Nephrectomy site without recurrence. NM bone scan - SD but \"increased uptake associated with the prior lesions of the lower  cervical spine, posterior right fourth rib and right L5 posterior arch elements. Otherwise " "unchanged uptake associated with the proximal left femur and left anterior fourth rib. Similar uptake of the left halux, possibly secondary to degenerative osteoarthritis or gout.\"    3/31/21 PSA = 0.44  7/7/21  PSA = 0.47  11/2021 PSA = 1.05  -- Start XTANDI  12/16/21 PSA =0.22  2/11/22 PSA= 0.50  3/22/22 PSA=0.72  5/5/2022 PSA=1.79  7/11/2022 PSA=2.16 --Start cycle 1 docetaxel   8/22/22 PSA = 1.36  9/12/22 PSA = 1.09  10/24/22 Cycle #6 of Docetaxel. End of treatment  1/9/23  PSA =0.66  3/20/23  PSA=1.54  4/21/23 PSA = 1.81  T=15  5/22/23 C1 Pluvicto   06/07/23          PSA = 1.42  7/18/23 PSA=1.75, C2 Pluvicto   8/28/23 Transfer of care initial visit for Tabby.  PSA 2.06.  C3 Pluvicto scheduled for 8/30.  Proceed with dose as planned.  MR L femur, ortho referral, PSMA PET ordered for prior to follow-up.    10/2/23 Palliative RT to L femur metastasis complete.  PSMA PET with mixed response.  PSA 2.21.  RT to L femur  pending.  Continue with Dose #4 of Pluvicto despite mixed response to therapy.  Dex course for possible pain   Flare with RT.    11/8/23  Follow-up prior to Dose #5 Pluvicto.  More pain in chest/ribs/back. PSA 4.38.  Testosterone=3.  Rad onc referral for ribs.  Biopsy progressive lesion for     Progression on Pluvicto.  Cabazitaxel scheduled for follow-up appointment.  Pending Tempus testing on biopsy sample versus peripheral blood.   11/27/23 Bx completed from R iliac crest but unrevealing for either PCa or RCC.  Start cabazitaxel C1D1.  PSA 2.81.        Radiation history:   -C7         3,000 cGy       06 X      8/05/2021 8/18/2021        13       10  -2 Sacrum          2,500 cGy       18 X      4/12/2022        4/18/2022         6           -Sacrum retreat               700 cGy        18 X      2/28/2023        2/28/2023  -Left femur to 2000 cGy in 5 fractions, completed 10/10/2023   -bilateral posterior chest wall at an area of osseous involvement of the ribs and adjacent T4 and 5 spine, to " "be delivered to 2000 cGy in 5 fractions.  completed 12/13-12/19/23.      2. Stage III ([id number]), grade 3 of 4, clear-cell RCC of right kidney:  - Incidentally diagnosed as above.   - Underwent curative-intent robotic right radical nephrectomy with Dr. Wesley Cleveland on 3/19/19. No tumor spillage per op report.   - Path showed: \"Histologic Type: Clear cell renal cell carcinoma; Sarcomatoid Features: Not identified; Histologic Grade: Nucleolar grade 3 (WHO/ISUP). Extent Tumor Size: 4.3 cm. Microscopic Tumor Extension: Tumor extension into renal sinus (in vascular structures). Margins: Negative. Tumor Necrosis: Present; focal. Lymph-Vascular Invasion: Not identified.  Pathologic Staging (pTNM) Primary Tumor (pT): pT3a: Tumor extends into renal vein branches/renal sinus. Regional Lymph Nodes (pN): pNX. Number of Lymph Nodes Examined: 0 Distant Metastasis (pM): pM N/A.\"  - Restaging scans as above.  No current concerns for recurrence.      PAST MEDICAL HISTORY:  Past Medical History:   Diagnosis Date     Cancer of kidney, right (H)      Complication of anesthesia     slow wakeup      Malignant neoplasm of prostate metastatic to bone (H) 2/14/2019     Thyroid disease      CURRENT MEDICATIONS:   Current Outpatient Medications:      atorvastatin (LIPITOR) 20 MG tablet, Take 60 mg by mouth daily , Disp: , Rfl:      calcium citrate-vitamin D (CITRACAL) 315-250 MG-UNIT TABS per tablet, Take 650 mg by mouth 2 times daily , Disp: , Rfl:      cycloSPORINE (RESTASIS) 0.05 % ophthalmic emulsion, Place 1 drop into both eyes 2 times daily , Disp: , Rfl:      dexAMETHasone (DECADRON) 4 MG tablet, Take 1 tablet (4 mg) by mouth daily, Disp: 7 tablet, Rfl: 2     fentaNYL (DURAGESIC) 25 mcg/hr 72 hr patch, Place 1 patch onto the skin every 72 hours remove old patch., Disp: 10 patch, Rfl: 0     gabapentin (NEURONTIN) 300 MG capsule, TAKE ONE CAPSULE BY MOUTH TWICE DAILY AND 2 CAPSULES AT BEDTIME, Disp: 120 capsule, Rfl: 3     " levothyroxine (SYNTHROID/LEVOTHROID) 112 MCG tablet, Take 112 mcg by mouth daily, Disp: , Rfl:      Multiple Vitamins-Minerals (MULTIVITAMIN ADULT EXTRA C PO), Take 1 tablet by mouth every 24 hours, Disp: , Rfl:      Nutritional Supplements (SALMON OIL) CAPS, Take 2 capsules by mouth daily , Disp: , Rfl:      omeprazole (PRILOSEC) 20 MG DR capsule, Take 20 mg by mouth daily, Disp: , Rfl:      ondansetron (ZOFRAN) 8 MG tablet, Take 1 tablet (8 mg) by mouth every 8 hours as needed for nausea, Disp: 30 tablet, Rfl: 4     oxyCODONE (ROXICODONE) 5 MG tablet, Take 1-2 tablets (5-10 mg) by mouth every 3 hours as needed for severe pain, Disp: 90 tablet, Rfl: 0     prochlorperazine (COMPAZINE) 10 MG tablet, Take 1 tablet (10 mg) by mouth every 6 hours as needed for nausea or vomiting, Disp: 90 tablet, Rfl: 1     tamsulosin (FLOMAX) 0.4 MG capsule, Take 1 capsule (0.4 mg) by mouth daily, Disp: 30 capsule, Rfl: 3     buprenorphine (BUTRANS) 20 MCG/HR WK patch, Place 1 patch onto the skin once a week (Patient not taking: Reported on 12/19/2023), Disp: , Rfl:      Glucosamine-Chondroit-Vit C-Mn (GLUCOSAMINE 1500 COMPLEX) CAPS, Take 1 capsule by mouth daily (Patient not taking: Reported on 12/19/2023), Disp: , Rfl:      loratadine (CLARITIN) 10 MG tablet, Take 10 mg by mouth daily (Patient not taking: Reported on 12/19/2023), Disp: , Rfl:      naloxone (NARCAN) 4 MG/0.1ML nasal spray, Spray 1 spray (4 mg) into one nostril alternating nostrils as needed for opioid reversal every 2-3 minutes until assistance arrives (Patient not taking: Reported on 12/19/2023), Disp: 0.2 mL, Rfl: 1     predniSONE (DELTASONE) 10 MG tablet, Take 1 tablet (10 mg) by mouth daily for 21 days, Disp: 21 tablet, Rfl: 0     prochlorperazine (COMPAZINE) 10 MG tablet, Take 1 tablet (10 mg) by mouth every 6 hours as needed for nausea or vomiting, Disp: 30 tablet, Rfl: 2     triamterene-HCTZ (MAXZIDE-25) 37.5-25 MG tablet, Take 1 tablet by mouth daily (Patient  not taking: Reported on 12/19/2023), Disp: , Rfl:   No current facility-administered medications for this visit.    Facility-Administered Medications Ordered in Other Visits:      cabazitaxel (JEVTANA) 50 mg in sodium chloride 0.9% in non-PVC container 280 mL infusion, 20 mg/m2 (Treatment Plan Recorded), Intravenous, Once, Veda Greenwood CNP     heparin 100 unit/mL injection 5 mL, 5 mL, Intracatheter, Once PRN, Veda Greenwood CNP     sodium chloride (PF) 0.9% PF flush 3-20 mL, 3-20 mL, Intracatheter, q1 min prn, Veda Greenwood, CNP      Physical Exam:   /75   Pulse 66   Temp 98.1  F (36.7  C) (Oral)   Resp 16   Wt 118.4 kg (261 lb)   SpO2 97%   BMI 36.40 kg/m    Wt Readings from Last 10 Encounters:   12/19/23 118.4 kg (261 lb)   12/13/23 118 kg (260 lb 1.6 oz)   11/27/23 117.9 kg (259 lb 14.4 oz)   11/15/23 117.9 kg (260 lb)   11/08/23 120.7 kg (266 lb 1.6 oz)   10/02/23 118.4 kg (261 lb)   09/20/23 116.1 kg (256 lb)   09/05/23 117 kg (258 lb)   08/28/23 121.1 kg (267 lb)   08/25/23 121.6 kg (268 lb 1.3 oz)   General: The patient is a pleasant male in no acute distress.  HEENT: EOMI. Sclerae are anicteric.   Lymph: Neck is supple with no lymphadenopathy in the cervical or supraclavicular areas.   Heart: Regular rate and rhythm.   Lungs: Clear to auscultation bilaterally.   Abdomen: Bowel sounds present, soft, nontender with no palpable hepatosplenomegaly or masses.   Extremities: No lower extremity edema noted bilaterally.   Neuro: Cranial nerves II through XII are grossly intact.  Skin: No rashes, petechiae, or bruising noted on exposed skin.  Psych: affect bright, alert and oriented          LABORATORY DATA:  Most Recent 3 CBC's:  Recent Labs   Lab Test 12/19/23  1000 11/27/23  1207 11/08/23  0856   WBC 3.2* 5.9 7.7   HGB 11.2* 11.0* 10.9*   MCV 88 89 88    243 226   ANEUTAUTO 2.2 4.0 6.4     Most Recent 3 BMP's:  Recent Labs   Lab Test 12/19/23  1000 11/27/23  1207 11/08/23  0856     "141 139   POTASSIUM 4.0 3.6 4.1   CHLORIDE 107 107 107   CO2 25 24 22   BUN 12.8 17.6 16.0   CR 0.88 1.02 0.86   ANIONGAP 8 10 10   BETHANY 8.6* 8.8 9.2   * 105* 118*   PROTTOTAL 6.5 6.7 7.1   ALBUMIN 3.9 3.8 4.0    Most Recent 3 LFT's:  Recent Labs   Lab Test 12/19/23  1000 11/27/23  1207 11/08/23  0856   AST 19 22 31   ALT 13 15 16   ALKPHOS 210* 246* 177*   BILITOTAL 0.3 0.2 0.2       I reviewed the above labs today.    PSA trend, see oncology history.      R iliac crest biopsy from 11/21/23 without evidence for malignancy.          ASSESSMENT & PLAN: Mr. Reyes is a delightful 63 year old gentleman with metastatic castration sensitive prostate adenocarcinoma as well as an incidentally diagnosed stage III clear-cell renal cell carcinoma of the right kidney (s/p radical R nephrectomy 3/19/19), who is here for a follow-up visit and initiation of docetafor now metastatic castration-resistant prostate cancer.     1. Metastatic castration-resistant prostate cancer, with involvement of the bones and RPLN:   - Patient met the criteria for CHAARTED \"high-volume\" metastatic hormone-sensitive prostate cancer. He opted for docetaxel in combination with ADT (per JOSEFA, [id number], STAMPEDE). He was subsequently treated with docetaxel x6 doses in the [facility] setting and enzalutamide.  He initiated Pluvicto in May 2023 with an initial slight decline in PSA, but this began to rise just prior to Cycle 3.  Given his XR evidence of progression in L femur and PSA rise, we re-evaluated his progression with PSMA PET scan in September 2023, with note of mixed response.  Reconsented for  [facility] on 8/28/23.  Due for 3 month collection at end of November 2023.  Canceled Pluvicto Dose for November due to symptomatic progression with PSA rise. PSA is labile. Biopsy results from 11/21 are pending for evaluation of NEPC vs small cell given progression without a significant rise in PSA.  Will also send peripheral blood Tempus " for evaluation of targetable mutations given the negative result from the 11/21/23 bone biopsy.   -Zometa next due 1/24/2024 along with Lupron.   -Palliative to manage further opiate refills. Pain is well controlled with fentanyl patch.   -Dexamethasone burst for RT and one on hand if needed for pain flares.      -Cabazitaxel started 11/27/23 given progression on Pluvicto. Tolerated cycle 1 well with fatigue and mild nausea. Continue Zofran and Compazine as needed, patient to call clinic if these are not sufficient in controlling nausea.     -Labs reviewed today with no concerns, ok to proceed with Cycle 2. Return to clinic in 3 weeks for cycle 3 with labs and follow up with Dr. Mcnair prior.     2. Bone metastases:   - Noted to have osseous metastatic disease at the time of diagnosis. S/p radiation to C spine and sacrum (Re-irradiation to sacrum).   - worsening radiculopathy down starting in the low back and radiating down the left leg in June. MRI read was without new/acute abnormalities  - Pain continues to fluctuate but overall well controlled/managable   - pain mgmt per palliative care, pain is now well controlled with fentanyl patch.   - Encouraged to continue calcium and vitamin D supplementation; continue Zometa 4mg IV every ~3 months. Due next in January 2024.   - RT to L femur 10/2023.  RT for bilateral posterior ribs 12/2023 with Dr. Albert, completed last dose today.   - Added to trial list for [id number], [id number].       3. Stage 3, UISS-high risk ccRCC: s/p R nephrectomy   - Nephrectomy 3/19/19 and noted incidentally.  He is now 4.5 years post-op without evidence for recurrence.   - At this point, there is a minimal risk of recurrence of his RCC given the time since surgery without the use of adjuvant immunotherapy. There is no suspicious adenopathy or other features on his most recent imaging studies.    - Will continue to monitor with imaging planned for prostate cancer.        *** minutes spent on the  date of the encounter doing {2021 E&M time in:[id number]}     LATESHA Humphreys CNP      Again, thank you for allowing me to participate in the care of your patient.        Sincerely,        LATESHA Humphreys CNP

## 2023-12-19 NOTE — NURSING NOTE
"Oncology Rooming Note    December 19, 2023 8:39 AM   Walter Reyes is a 63 year old male who presents for:    Chief Complaint   Patient presents with    Oncology Clinic Visit     Prostate cancer     Initial Vitals: /75   Pulse 66   Temp 98.1  F (36.7  C) (Oral)   Resp 16   Wt 118.4 kg (261 lb)   SpO2 97%   BMI 36.40 kg/m   Estimated body mass index is 36.4 kg/m  as calculated from the following:    Height as of 11/27/23: 1.803 m (5' 11\").    Weight as of this encounter: 118.4 kg (261 lb). Body surface area is 2.44 meters squared.  No Pain (1) Comment: Data Unavailable   No LMP for male patient.  Allergies reviewed: Yes  Medications reviewed: Yes    Medications: needs Steroids refilled if to be continued    Pharmacy name entered into EPIC:    [facility] [place], [place] - [address] [id number] [place], [place] - [address] [facility] - [place], [place] - [address] [place] [address] [facility]ARM[facility]Y [place], [place] - [address] [facility] [id number]  [facility] [place], [place] - [address] [address]    Clinical concerns: none       Holly Owens CMA            "

## 2023-12-19 NOTE — PROGRESS NOTES
[facility] Oncology Followup  Dec 19, 2023    REASON FOR VISIT: Follow-up for metastatic castration-resistant prostate cancer.        INTERVAL HISTORY:   Finished radiation to ribs today. Occasional pain to forehead, femur and ribs but all much improved since starting fentanyl patch.    Fatigue is unchanged. Some days are better than others, but overall energy is low. Felt fatigued after first dose of cabazitaxel.     Had nausea for 1 week following first dose of cabazitaxel. Alternated Compazine and Zofran with good control over nausea. Bowels fluctuate between constipation and a few episodes of diarrhea.     Had neuropathy at baseline from prior chemo. Primarily in right foot, but both feet are numb with pinching pain. Left leg has positional sensation of pins and needles from hip all the way to toes when he lays down on left side. Maybe gotten a ilttle better recently. Previously it was lasting ~1 minute, now pins and needles resolve after ~15-20 seconds. Has discussed with Dr. Albert and continues to closely monitor.      Blood pressure lower in clinic today. Reports he stopped taking antihypertensives completely, monitors BP at home. Most of the time readings are 120's or greater. Occasional readings are lower, with a couple under 100 systolic. He feels weaker with low readings, but denies feeling dizzy or lightheaded.         ONCOLOGY HISTORY: Mr. Walter Reyes is a 63 year old gentleman with a metastatic castration-sensitive prostate cancer and incidentally diagnosed stage 3 clear cell RCC (s/p radical R nephrectomy on 3/19/19) which is now 4.5+ years post-therapy without evidence of recurrence. His oncologic history is detailed below.     1. 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 Naylor at [facility]. Prior PSA  "were 1.4 on 8/16/17, 2.4 on 6/28/16, 2.9 on 6/28/16, and as low as 0.4 on 3/26/2003.   - 1/08/2019: Consultation with Sarah Wilson CNP in Urology clinic. Repeat PSA 9.6.  - 1/16/2019: MRI prostate with contrast - \"This examination is characterized as PIRADS 5- very high probability. Clinically significant cancer is highly likely to be present. There is a large, invasive mass arising from the right peripheral zone and extending into the neurovascular bundle, seminal vesicle, and along the anterolateral right mesorectal fascia. Metastatic right external iliac lymph node. Metastatic lesion in the posterior/superior right acetabulum.\"  - 1/25/2019: CT abdomen and pelvis with IV contrast - \"1. Heterogeneous enhancing mass posteriorly in the upper pole of the right kidney measures 4.5 x 5.8 x 5.7 cm (AP by transverse by craniocaudal). It has a small nodular component extending posterior medially which abuts the right psoas muscle. This nodular extension measures 2.1 x 2.1 cm. Minimal stranding about the mass. No definite thrombus within the right renal vein. This renal mass is compatible with a renal cell carcinoma. A paraaortic lymph node situated immediately posterior to the left renal vein measures 1.1 cm in short axis, suspicious for metastasis. 2. A 2 cm right external iliac lymph node is also suspicious for metastases. 3. Multiple sclerotic osseous lesions suspicious for metastases. These include 0.8 and 0.6 cm sclerotic lesions laterally in the right iliac wing (images 53 and 62 respectively). Ill-defined groundglass density laterally in the right acetabulum measuring 1.7 x 1.2 cm corresponds with the lesion identified on MRI. A 0.4 cm groundglass density in the left acetabulum. Sclerotic lesion in the left femoral neck measures 0.9 x 1.6 cm (image 81). Sclerotic metastases would be more compatible with prostate metastases. 4. A 3 subcentimeter hepatic lesions are indeterminate. Metastases would be a " "consideration. These can be further characterized with liver MRI.\"  - 1/25/2019: NM bone scan - \"There is focal bony uptake in the left femoral neck, right acetabulum, right of midline at the S1 or L5 level of the spine, within multiple bilateral ribs, in the C7 or T1 level of the spine, and anteriorly within the skull. Findings are suspicious for metastases.\"  - 1/31/19: CT chest with contrast - \" No suspicious nodules in the chest.  Stable appearance of a 5.8 cm right renal mass concerning for renal carcinoma until proven otherwise.  Stable indeterminant subcentimeter hypodensities in the liver.  Multifocal osteoblastic metastasis including 2.5 cm lesion in the right fifth rib posteriorly and a 1.6 cm lesion in the right third rib posteriorly. No lytic lesions identified.\"  - 2/6/19: CT-guided right sclerotic fifth rib lesion biopsy - \"Metastatic carcinoma, consistent with prostate primary.  Immunohistochemical stains performed show the metastatic carcinoma stains positive for NKX3.1 (prostate marker), negative for STACIE 3 and PAX8, which supports the above diagnosis.\"  - 2/15/19: Started Casodex 50mg every day and consented for the biobanking protocol. 3/18/19 - stopped Casodex.  - 2/19/19: Case discussed in tumor board - recommendation for nephectomy for suspected malignant right renal mass.   - 2/26/19: Started Lupron 22.5mg every 3 months.   - 5/8/19: Started Docetaxel 75mg/m2 IV every 3 weeks. 06/18/19 - cycle 3, 7/10/19 - cycle 4, 07/31/19 - cycle 5, 08/21/19 - cycle 6.   - 10/2/19: Restaging CT CAP and NM Bone Scan showed improved osseous disease, no evidence of recurrent or new metastatic disease.  - 1/5/20: Restaging CT CAP and NM Bone Scan showed stable osseous disease, no evidence of recurrent or new metastatic disease.  - 4/6/20: NM bone scan with slightly improved uptake in known skeletal mets. CT C/A/P with contrast with stable osseous mets, no yusuf enlargement, no new visceral mets etc.  - 04/08/20: " "Zometa every 3 months.  - 10/5/20: CT C/A/P with contrast and NM bone scan - SD.   - 1/4/21: CT C/A/P with contrast and NM bone scan - SD but slight increase in right 5th rib tracer uptake and in posterior L5 sclerosis. 1/6/21  PSA = 0.29  - 03/29/21: CT C/A/P with contrast - single new right sacral 8mm bone lesion (suspicious), other bone mets stable. No visceral/yusuf disease. Nephrectomy site without recurrence. NM bone scan - SD but \"increased uptake associated with the prior lesions of the lower  cervical spine, posterior right fourth rib and right L5 posterior arch elements. Otherwise unchanged uptake associated with the proximal left femur and left anterior fourth rib. Similar uptake of the left halux, possibly secondary to degenerative osteoarthritis or gout.\"    3/31/21 PSA = 0.44  7/7/21  PSA = 0.47  11/2021 PSA = 1.05  -- Start XTANDI  12/16/21 PSA =0.22  2/11/22 PSA= 0.50  3/22/22 PSA=0.72  5/5/2022 PSA=1.79  7/11/2022 PSA=2.16 --Start cycle 1 docetaxel   8/22/22 PSA = 1.36  9/12/22 PSA = 1.09  10/24/22 Cycle #6 of Docetaxel. End of treatment  1/9/23  PSA =0.66  3/20/23  PSA=1.54  4/21/23 PSA = 1.81  T=15  5/22/23 C1 Pluvicto   06/07/23          PSA = 1.42  7/18/23 PSA=1.75, C2 Pluvicto   8/28/23 Transfer of care initial visit for Zomatheusko.  PSA 2.06.  C3 Pluvicto scheduled for 8/30.  Proceed with dose as planned.  MR L femur, ortho referral, PSMA PET ordered for prior to follow-up.    10/2/23 Palliative RT to L femur metastasis complete.  PSMA PET with mixed response.  PSA 2.21.  RT to L femur  pending.  Continue with Dose #4 of Pluvicto despite mixed response to therapy.  Dex course for possible pain   Flare with RT.    11/8/23  Follow-up prior to Dose #5 Pluvicto.  More pain in chest/ribs/back. PSA 4.38.  Testosterone=3.  Rad onc referral for ribs.  Biopsy progressive lesion for     Progression on Pluvicto.  Cabazitaxel scheduled for follow-up appointment.  Pending Tempus testing on biopsy sample versus " "peripheral blood.   11/27/23 Bx completed from R iliac crest but unrevealing for either PCa or RCC.  Start cabazitaxel C1D1.  PSA 2.81.        Radiation history:   -C7         3,000 cGy       06 X      8/05/2021        8/18/2021        13       10  -2 Sacrum          2,500 cGy       18 X      4/12/2022        4/18/2022         6           -Sacrum retreat               700 cGy        18 X      2/28/2023        2/28/2023  -Left femur to 2000 cGy in 5 fractions, completed 10/10/2023   -bilateral posterior chest wall at an area of osseous involvement of the ribs and adjacent T4 and 5 spine, to be delivered to 2000 cGy in 5 fractions.  completed 12/13-12/19/23.      2. Stage III ([id number]), grade 3 of 4, clear-cell RCC of right kidney:  - Incidentally diagnosed as above.   - Underwent curative-intent robotic right radical nephrectomy with Dr. Wesley Cleveland on 3/19/19. No tumor spillage per op report.   - Path showed: \"Histologic Type: Clear cell renal cell carcinoma; Sarcomatoid Features: Not identified; Histologic Grade: Nucleolar grade 3 (WHO/ISUP). Extent Tumor Size: 4.3 cm. Microscopic Tumor Extension: Tumor extension into renal sinus (in vascular structures). Margins: Negative. Tumor Necrosis: Present; focal. Lymph-Vascular Invasion: Not identified.  Pathologic Staging (pTNM) Primary Tumor (pT): pT3a: Tumor extends into renal vein branches/renal sinus. Regional Lymph Nodes (pN): pNX. Number of Lymph Nodes Examined: 0 Distant Metastasis (pM): pM N/A.\"  - Restaging scans as above.  No current concerns for recurrence.      PAST MEDICAL HISTORY:  Past Medical History:   Diagnosis Date    Cancer of kidney, right (H)     Complication of anesthesia     slow wakeup     Malignant neoplasm of prostate metastatic to bone (H) 2/14/2019    Thyroid disease      CURRENT MEDICATIONS:   Current Outpatient Medications:     atorvastatin (LIPITOR) 20 MG tablet, Take 60 mg by mouth daily , Disp: , Rfl:     calcium citrate-vitamin D " (CITRACAL) 315-250 MG-UNIT TABS per tablet, Take 650 mg by mouth 2 times daily , Disp: , Rfl:     cycloSPORINE (RESTASIS) 0.05 % ophthalmic emulsion, Place 1 drop into both eyes 2 times daily , Disp: , Rfl:     dexAMETHasone (DECADRON) 4 MG tablet, Take 1 tablet (4 mg) by mouth daily, Disp: 7 tablet, Rfl: 2    fentaNYL (DURAGESIC) 25 mcg/hr 72 hr patch, Place 1 patch onto the skin every 72 hours remove old patch., Disp: 10 patch, Rfl: 0    gabapentin (NEURONTIN) 300 MG capsule, TAKE ONE CAPSULE BY MOUTH TWICE DAILY AND 2 CAPSULES AT BEDTIME, Disp: 120 capsule, Rfl: 3    levothyroxine (SYNTHROID/LEVOTHROID) 112 MCG tablet, Take 112 mcg by mouth daily, Disp: , Rfl:     Multiple Vitamins-Minerals (MULTIVITAMIN ADULT EXTRA C PO), Take 1 tablet by mouth every 24 hours, Disp: , Rfl:     Nutritional Supplements (SALMON OIL) CAPS, Take 2 capsules by mouth daily , Disp: , Rfl:     omeprazole (PRILOSEC) 20 MG DR capsule, Take 20 mg by mouth daily, Disp: , Rfl:     ondansetron (ZOFRAN) 8 MG tablet, Take 1 tablet (8 mg) by mouth every 8 hours as needed for nausea, Disp: 30 tablet, Rfl: 4    oxyCODONE (ROXICODONE) 5 MG tablet, Take 1-2 tablets (5-10 mg) by mouth every 3 hours as needed for severe pain, Disp: 90 tablet, Rfl: 0    prochlorperazine (COMPAZINE) 10 MG tablet, Take 1 tablet (10 mg) by mouth every 6 hours as needed for nausea or vomiting, Disp: 90 tablet, Rfl: 1    tamsulosin (FLOMAX) 0.4 MG capsule, Take 1 capsule (0.4 mg) by mouth daily, Disp: 30 capsule, Rfl: 3    buprenorphine (BUTRANS) 20 MCG/HR WK patch, Place 1 patch onto the skin once a week (Patient not taking: Reported on 12/19/2023), Disp: , Rfl:     Glucosamine-Chondroit-Vit C-Mn (GLUCOSAMINE 1500 COMPLEX) CAPS, Take 1 capsule by mouth daily (Patient not taking: Reported on 12/19/2023), Disp: , Rfl:     loratadine (CLARITIN) 10 MG tablet, Take 10 mg by mouth daily (Patient not taking: Reported on 12/19/2023), Disp: , Rfl:     naloxone (NARCAN) 4 MG/0.1ML  nasal spray, Spray 1 spray (4 mg) into one nostril alternating nostrils as needed for opioid reversal every 2-3 minutes until assistance arrives (Patient not taking: Reported on 12/19/2023), Disp: 0.2 mL, Rfl: 1    predniSONE (DELTASONE) 10 MG tablet, Take 1 tablet (10 mg) by mouth daily for 21 days, Disp: 21 tablet, Rfl: 0    prochlorperazine (COMPAZINE) 10 MG tablet, Take 1 tablet (10 mg) by mouth every 6 hours as needed for nausea or vomiting, Disp: 30 tablet, Rfl: 2    triamterene-HCTZ (MAXZIDE-25) 37.5-25 MG tablet, Take 1 tablet by mouth daily (Patient not taking: Reported on 12/19/2023), Disp: , Rfl:   No current facility-administered medications for this visit.    Facility-Administered Medications Ordered in Other Visits:     cabazitaxel (JEVTANA) 50 mg in sodium chloride 0.9% in non-PVC container 280 mL infusion, 20 mg/m2 (Treatment Plan Recorded), Intravenous, Once, Veda Greenwood CNP    heparin 100 unit/mL injection 5 mL, 5 mL, Intracatheter, Once PRN, Veda Greenwood CNP    sodium chloride (PF) 0.9% PF flush 3-20 mL, 3-20 mL, Intracatheter, q1 min prn, Veda Greenwood CNP      Physical Exam:   /75   Pulse 66   Temp 98.1  F (36.7  C) (Oral)   Resp 16   Wt 118.4 kg (261 lb)   SpO2 97%   BMI 36.40 kg/m    Wt Readings from Last 10 Encounters:   12/19/23 118.4 kg (261 lb)   12/13/23 118 kg (260 lb 1.6 oz)   11/27/23 117.9 kg (259 lb 14.4 oz)   11/15/23 117.9 kg (260 lb)   11/08/23 120.7 kg (266 lb 1.6 oz)   10/02/23 118.4 kg (261 lb)   09/20/23 116.1 kg (256 lb)   09/05/23 117 kg (258 lb)   08/28/23 121.1 kg (267 lb)   08/25/23 121.6 kg (268 lb 1.3 oz)   General: The patient is a pleasant male in no acute distress.  HEENT: EOMI. Sclerae are anicteric.   Lymph: Neck is supple with no lymphadenopathy in the cervical or supraclavicular areas.   Heart: Regular rate and rhythm.   Lungs: Clear to auscultation bilaterally.   Abdomen: Bowel sounds present, soft, nontender with no palpable  "hepatosplenomegaly or masses.   Extremities: No lower extremity edema noted bilaterally.   Neuro: Cranial nerves II through XII are grossly intact.  Skin: No rashes, petechiae, or bruising noted on exposed skin.  Psych: affect bright, alert and oriented          LABORATORY DATA:  Most Recent 3 CBC's:  Recent Labs   Lab Test 12/19/23  1000 11/27/23  1207 11/08/23  0856   WBC 3.2* 5.9 7.7   HGB 11.2* 11.0* 10.9*   MCV 88 89 88    243 226   ANEUTAUTO 2.2 4.0 6.4     Most Recent 3 BMP's:  Recent Labs   Lab Test 12/19/23  1000 11/27/23  1207 11/08/23  0856    141 139   POTASSIUM 4.0 3.6 4.1   CHLORIDE 107 107 107   CO2 25 24 22   BUN 12.8 17.6 16.0   CR 0.88 1.02 0.86   ANIONGAP 8 10 10   BETHANY 8.6* 8.8 9.2   * 105* 118*   PROTTOTAL 6.5 6.7 7.1   ALBUMIN 3.9 3.8 4.0    Most Recent 3 LFT's:  Recent Labs   Lab Test 12/19/23  1000 11/27/23  1207 11/08/23  0856   AST 19 22 31   ALT 13 15 16   ALKPHOS 210* 246* 177*   BILITOTAL 0.3 0.2 0.2       I reviewed the above labs today.    PSA trend, see oncology history.      R iliac crest biopsy from 11/21/23 without evidence for malignancy.          ASSESSMENT & PLAN: Mr. Reyes is a delightful 63 year old gentleman with metastatic castration sensitive prostate adenocarcinoma as well as an incidentally diagnosed stage III clear-cell renal cell carcinoma of the right kidney (s/p radical R nephrectomy 3/19/19), who is here for a follow-up visit and initiation of docetafor now metastatic castration-resistant prostate cancer.     1. Metastatic castration-resistant prostate cancer, with involvement of the bones and RPLN:   - Patient met the criteria for CHAARTED \"high-volume\" metastatic hormone-sensitive prostate cancer. He opted for docetaxel in combination with ADT (per JOSEFA, [id number], STAMPEDE). He was subsequently treated with docetaxel x6 doses in the [facility] setting and enzalutamide.  He initiated Pluvicto in May 2023 with an initial slight decline in " PSA, but this began to rise just prior to Cycle 3.  Given his XR evidence of progression in L femur and PSA rise, we re-evaluated his progression with PSMA PET scan in September 2023, with note of mixed response.  Reconsented for  [employer] on 8/28/23.  Due for 3 month collection at end of November 2023.  Canceled Pluvicto Dose for November due to symptomatic progression with PSA rise. PSA is labile. Biopsy results from 11/21 are pending for evaluation of NEPC vs small cell given progression without a significant rise in PSA.  Will also send peripheral blood Tempus for evaluation of targetable mutations given the negative result from the 11/21/23 bone biopsy.   -Zometa next due 1/24/2024 along with Lupron.   -Palliative to manage further opiate refills. Pain is well controlled with fentanyl patch.   -Dexamethasone burst for RT and one on hand if needed for pain flares.      -Cabazitaxel started 11/27/23 given progression on Pluvicto. Tolerated cycle 1 well with fatigue and mild nausea. Continue Zofran and Compazine as needed, patient to call clinic if these are not sufficient in controlling nausea.     -Labs reviewed today with no concerns, ok to proceed with Cycle 2. Return to clinic in 3 weeks for cycle 3 with labs and follow up with Dr. Mcnair prior.     2. Bone metastases:   - Noted to have osseous metastatic disease at the time of diagnosis. S/p radiation to C spine and sacrum (Re-irradiation to sacrum).   - worsening radiculopathy down starting in the low back and radiating down the left leg in June. MRI read was without new/acute abnormalities  - Pain continues to fluctuate but overall well controlled/managable   - pain mgmt per palliative care, pain is now well controlled with fentanyl patch.   - Encouraged to continue calcium and vitamin D supplementation; continue Zometa 4mg IV every ~3 months. Due next in January 2024.   - RT to L femur 10/2023.  RT for bilateral posterior ribs 12/2023 with Dr. Albert,  completed last dose today.   - Added to trial list for [id number], [id number].       3. Stage 3, UISS-high risk ccRCC: s/p R nephrectomy   - Nephrectomy 3/19/19 and noted incidentally.  He is now 4.5 years post-op without evidence for recurrence.   - At this point, there is a minimal risk of recurrence of his RCC given the time since surgery without the use of adjuvant immunotherapy. There is no suspicious adenopathy or other features on his most recent imaging studies.    - Will continue to monitor with imaging planned for prostate cancer.        40 minutes spent on the date of the encounter doing chart review, review of test results, interpretation of tests, patient visit, and documentation     LATESHA Humphreys CNP

## 2023-12-19 NOTE — PROGRESS NOTES
Infusion Nursing Note:  Walter Reyes presents today for Cycle 2 Day 1 Cabazitaxel.    Patient seen by provider today: Yes: Sherry Lee CNP   present during visit today: Not Applicable.    Note: Patient was seen and assessed by Sherry Lee CNP in clinic prior to infusion. Patient offers no additional complaints or concerns. Patient agreeable to treatment plan today.    Intravenous Access:  Implanted Port.    Treatment Conditions:  Lab Results   Component Value Date    HGB 11.2 (L) 12/19/2023    WBC 3.2 (L) 12/19/2023    ANEU 5.0 07/07/2021    ANEUTAUTO 2.2 12/19/2023     12/19/2023        Lab Results   Component Value Date     12/19/2023    POTASSIUM 4.0 12/19/2023    MAG 1.9 08/25/2023    CR 0.88 12/19/2023    BETHANY 8.6 (L) 12/19/2023    BILITOTAL 0.3 12/19/2023    ALBUMIN 3.9 12/19/2023    ALT 13 12/19/2023    AST 19 12/19/2023     Results reviewed, labs MET treatment parameters, ok to proceed with treatment.    Post Infusion Assessment:  Patient tolerated infusion without incident.  Blood return noted pre and post infusion.  Site patent and intact, free from redness, edema or discomfort.  No evidence of extravasations.  Access discontinued per protocol.     Discharge Plan:   Prescription refills given for Prednisone.  Discharge instructions reviewed with: Patient and Family.  Patient and/or family verbalized understanding of discharge instructions and all questions answered.  AVS to patient via [facility].  Patient will return 01/08/24 for next appointment.   Patient discharged in stable condition accompanied by: self and wife.  Departure Mode: Ambulatory.      Heather Fatima RN

## 2023-12-20 ENCOUNTER — VIRTUAL VISIT (OUTPATIENT)
Dept: PALLIATIVE MEDICINE | Facility: CLINIC | Age: 63
End: 2023-12-20
Payer: COMMERCIAL

## 2023-12-20 VITALS — HEIGHT: 71 IN | WEIGHT: 260 LBS | BODY MASS INDEX: 36.4 KG/M2

## 2023-12-20 DIAGNOSIS — C61 PROSTATE CANCER (H): ICD-10-CM

## 2023-12-20 PROCEDURE — 99214 OFFICE O/P EST MOD 30 MIN: CPT | Mod: VID | Performed by: NURSE PRACTITIONER

## 2023-12-20 RX ORDER — OXYCODONE HYDROCHLORIDE 5 MG/1
5-10 TABLET ORAL
Qty: 90 TABLET | Refills: 0 | Status: SHIPPED | OUTPATIENT
Start: 2023-12-20 | End: 2024-03-28

## 2023-12-20 NOTE — PROGRESS NOTES
RADIATION ONCOLOGY WEEKLY ON TREATMENT VISIT   Encounter Date: Dec 13, 2023     Patient Name: Walter Reyes  MRN: [id number]  : 1960     Disease and Stage: IV prostate cancer  Treatment Site: T3-T6 and bilateral ribs  Current Dose/Planned Total Dose: 400 / 2000 cGy  Current Fraction/Planned Total fractions: 1 / 5  Concurrent Chemotherapy: cabazitaxel   Drug and Frequency: for dose 5      ED visits/Hospitalizations:  None    Missed Treatments:  None    Subjective: Mr. Reyes presents to clinic today for his weekly on-treatment visit. He is reporting minimal pain at the treatment site due to addition of Fentanyl.     Nursing ROS:   Nutrition Alteration  Diet Type: Patient's Preference  Skin  Skin Reaction: 0 - No changes  CNS Alteration  CNS note: Pt shared about an increase in tingling, particulary on the L side of his body from the buttocks down to his foot.  This tingling used to occur just for a minute when first lying down, but now it comes and goes throughout the night.     Cardiovascular  Respiratory effort: 1 - Normal - without distress  Gastrointestinal  Nausea: 0 - None  Diarrhea: 0 - None     Psychosocial  Mood - Anxiety: 0 - Normal  Pain Assessment  0-10 Pain Scale: 1  Pain Treatment: Pt states the added fentanyl patch to his pain regimen has made a huge difference in the severity of his pain.      Objective:   /70 (BP Location: Right arm)   Pulse 54   Resp 18   Wt 118 kg (260 lb 1.6 oz)   SpO2 97%   BMI 36.28 kg/m     KPS 80  Pain Score No Pain (1)/10  General: Alert and in no acute distress.  HEENT: Normocephalic, atraumatic. No visible scleral icterus.  Neck: Apparent full range of motion.  CV: Appears well-perfused, with no visible cyanosis.  Lungs: Breathing easily on room air, with no difficulty completing full sentences  Extremities:  No visible edema of the upper extremities.   Neuro: Alert and oriented; grossly nonfocal. Normal speech. Moving upper and lower  extremities equally.    Skin: No visible jaundice. No suspicious lesions of the visualized integuments.  Psych: Mood and affect are appropriate to given situation. Answers questions appropriately.        Treatment-related toxicities (CTCAE v5.0):  Fatigue: Grade 0: No toxicity  Pain: Grade 1: Mild pain  Dyspnea: Grade 0: No toxicity  Esophagitis: Grade 0: No toxicity    Assessment:    Mr. 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  Unfortunately, he has had increased pain primarily along the right posterior upper chest wall with imaging showing an area of metastatic osseous involvement of the ribs and adjacent increasing sclerosis of T4 and T5 vertebral bodies, with some extension into the left ribs at this level as well.  PSA was also noted to increase from 2.21 in 10/2023 to 4.38 this month.  Given symptomatic progression with PSA rise, his next dose of Pluvicto planned for this month was canceled.  He is receiving his next line of systemic therapy cabazitaxel and prednisone, to be delivered next on 12/19/2023.     It is noted that his treatment was delayed by the need for insurance approval to afford for IMRT, given large nature of the treatment field that abuts his prior treatment field as well. As such, his last dose of radiation will be given on the date of his next infusion of cabazitaxel . I discussed this with Dr. Mcnair, who approved of concurrent treatment on the last day.    Plan:   Bone metastases:  Continue radiotherapy  Followup video visit in 6 weeks to assess response    Pain management:  No change  indicated    Dermatitis:  Skin care reviewed    Mosaiq chart and setup information reviewed  MVCT images reviewed    Medication Review  Med list reviewed with patient?: Yes  Medication changes: No changes indicated per Patient.    Lexis Albert MD, MPH, PhD     Department of Radiation Oncology  [facility]

## 2023-12-20 NOTE — PROGRESS NOTES
Virtual Visit Details    Type of service:  Video Visit     Originating Location (pt. Location): Home    Distant Location (provider location):  Off-site  Platform used for Video Visit: [facility]      Palliative Care Outpatient Clinic      Patient ID/Chief Complaint: Walter Reyes 63 year old male who is presenting to the palliative medicine clinic today at the request of Yamilet Espinoza PA-C for a palliative care follow-up secondary to metastatic prostate cancer.   The patient's primary care provider is:  Poncho Ortiz       Impression & Recommendations:  63-year-old male with metastatic prostate cancer with spinal and bone metastases.  Palliative care assistance requested for symptom management of chronic cancer associated back pain with radiculopathy in the legs.    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  High risk for recurrence.    Past medical history also includes hyperlipidemia, hypothyroidism, HTN, GERD, Acosta's neuroma, plantar fasciitis, peroneal tendinitis, and mild chemotherapy-induced polyneuropathy.    Radiculopathy in both legs, has improved on gabapentin.  He has constant pain over the sacrum and to the left of the sacrum.  Pain is constant, deep, aching.  Interferes with sleep. Pain worsens when reclining or lying down. Due to progressive cancer, he is also having more rib and back pain. He is not seeking RTX at this time, rather opting for medication management.     Symptoms/recommendations/discussion:  Pain is well controlled on fentanyl patch 25 mcg/h.   Continue gabapentin 300 mg every morning and afternoon, 600 mg at bedtime. He's has some worsening in neuropathic pain in legs, dermatome distribution suggests related to area and spine previously irradiated and no further radiation possible.  Dysesthesia primarily described as numbness and tingling down left side of his left leg into foot that last for a few  "minutes at max.  He feels as though it is getting better recently.  He knows we could add Cymbalta for general pain, neuropathic pain, and mood but wants to hold off at this time.  Continue oxycodone IR 5-10 mg every 4 hours as needed, generally takes 1 tab at bedtime but minimal use otherwise  Counseled to monitor for constipation  Counseled opioid safety, Narcan nasal spray ordered  Social situation includes residing in home with wife Micheline. Stable housing and food. No h/o substance abuse.   Palliative follow-up within the next 6-8 weeks. He has direct phone # for our team.         How to get a hold of us:  For non-urgent matters, MyChart works best.    For more urgent matters, or if you prefer not to use MyChart, call our clinic nurse coordinator Sherry Palma RN at [phone] or [phone]    We have an on-call number for evenings and weekends. Please call this only if you are having uncontrolled symptoms or serious side effects from your medicines: [phone].     For refills, please give us a week (5 working days) notice. We don't always have providers available everyday to do refills. If you call the day you run out of your medicine, we may not be able to refill it in time, so call 5 days in advance        History:  History gathered today from: patient, family/loved ones, medical chart, outside records including Care Everywhere      PE: Ht 1.803 m (5' 11\")   Wt 117.9 kg (260 lb)   BMI 36.26 kg/m     Wt Readings from Last 3 Encounters:   12/20/23 117.9 kg (260 lb)   12/19/23 118.4 kg (261 lb)   12/13/23 118 kg (260 lb 1.6 oz)       Gen alert, comfortable appearing, NAD.   Head NCAT.  Eyes anicteric without injection  Face symmetric, eyes conjugate  Lungs unlabored, no cough, speaking full sentences  Skin no rashes or lesions evident on face/neck  Neuro Face symmetric, eyes conjugate; speech fluent.  Neuropsych exam normal including affect, sensorium, gross memory, thought processes, and fund of " knowledge.         Data reviewed:  I reviewed electrolytes, BUN/creatinine, liver profile, hemoglobin and hematocrit, platelet count, and most recent imaging  Recent oncology notes     database reviewed: 12/20/2023        38 minutes spent on the date of the encounter doing chart review, history and exam, patient education & counseling, documentation and other activities as noted above.        Thank you for involving us in the patient's care.   LATESHA Seaman, [facility]

## 2023-12-20 NOTE — PATIENT INSTRUCTIONS
Thank you for meeting with us in the [facility].    How to get a hold of us:  For non-urgent matters, MyChart works best.    For more urgent matters, or if you prefer not to use MyChart, call our clinic nurse coordinator Sherry Palma RN at [phone] or [phone]    We have an on-call number for evenings and weekends. Please call this only if you are having uncontrolled symptoms or serious side effects from your medicines: [phone].     For refills, please give us a week (5 working days) notice. We don't always have providers available everyday to do refills. If you call the day you run out of your medicine, we may not be able to refill it in time, so call 5 days in advance!

## 2023-12-20 NOTE — NURSING NOTE
Is the patient currently in the state of [place]? YES    Visit mode:VIDEO    If the visit is dropped, the patient can be reconnected by: TELEPHONE VISIT: Phone number: [phone]    Will anyone else be joining the visit? NO  (If patient encounters technical issues they should call [phone]    How would you like to obtain your AVS? MyChart    Are changes needed to the allergy or medication list? No    Reason for visit: RECHECK    Sumaya KAY

## 2023-12-21 LAB — TESTOST SERPL-MCNC: 3 NG/DL (ref 240–950)

## 2023-12-26 ENCOUNTER — MYC MEDICAL ADVICE (OUTPATIENT)
Dept: RADIATION ONCOLOGY | Facility: CLINIC | Age: 63
End: 2023-12-26

## 2023-12-26 DIAGNOSIS — C61 MALIGNANT NEOPLASM OF PROSTATE METASTATIC TO BONE (H): Primary | ICD-10-CM

## 2023-12-26 DIAGNOSIS — K20.80 RADIATION-INDUCED ESOPHAGITIS: ICD-10-CM

## 2023-12-26 DIAGNOSIS — T66.XXXA RADIATION-INDUCED ESOPHAGITIS: ICD-10-CM

## 2023-12-26 DIAGNOSIS — C79.51 MALIGNANT NEOPLASM OF PROSTATE METASTATIC TO BONE (H): Primary | ICD-10-CM

## 2023-12-26 RX ORDER — LIDOCAINE HYDROCHLORIDE 20 MG/ML
15 SOLUTION OROPHARYNGEAL
Qty: 100 ML | Refills: 1 | Status: SHIPPED | OUTPATIENT
Start: 2023-12-26

## 2023-12-26 NOTE — TELEPHONE ENCOUNTER
Discussed viscous lidocaine and maalox before meals and at bedtime. Mr. Reyes will call if symptoms worsen or persist. I anticipate that this will be improving in the coming week.    Lexis Albert

## 2023-12-29 DIAGNOSIS — G89.3 CANCER ASSOCIATED PAIN: ICD-10-CM

## 2023-12-29 DIAGNOSIS — C61 PROSTATE CANCER (H): ICD-10-CM

## 2023-12-29 RX ORDER — FENTANYL 25 UG/1
1 PATCH TRANSDERMAL
Qty: 10 PATCH | Refills: 0 | Status: SHIPPED | OUTPATIENT
Start: 2023-12-29 | End: 2024-01-24

## 2023-12-29 NOTE — TELEPHONE ENCOUNTER
Received [employer] message from patient requesting refill of fentanyl patch.     Last refill: 12/1/2020  Last office visit: 12/20/2023  Scheduled for follow up 1/24/2024    Will route request to NP for review.     Reviewed [place]  Report.

## 2024-01-02 ENCOUNTER — MYC MEDICAL ADVICE (OUTPATIENT)
Dept: ONCOLOGY | Facility: CLINIC | Age: 64
End: 2024-01-02

## 2024-01-08 ENCOUNTER — APPOINTMENT (OUTPATIENT)
Dept: LAB | Facility: CLINIC | Age: 64
End: 2024-01-08
Payer: COMMERCIAL

## 2024-01-08 ENCOUNTER — INFUSION THERAPY VISIT (OUTPATIENT)
Dept: ONCOLOGY | Facility: CLINIC | Age: 64
End: 2024-01-08
Attending: STUDENT IN AN ORGANIZED HEALTH CARE EDUCATION/TRAINING PROGRAM
Payer: COMMERCIAL

## 2024-01-08 VITALS
TEMPERATURE: 98.3 F | HEART RATE: 57 BPM | OXYGEN SATURATION: 97 % | WEIGHT: 263.6 LBS | BODY MASS INDEX: 36.76 KG/M2 | DIASTOLIC BLOOD PRESSURE: 50 MMHG | RESPIRATION RATE: 18 BRPM | SYSTOLIC BLOOD PRESSURE: 109 MMHG

## 2024-01-08 DIAGNOSIS — T45.1X5A CHEMOTHERAPY-INDUCED PERIPHERAL NEUROPATHY (H): ICD-10-CM

## 2024-01-08 DIAGNOSIS — C61 MALIGNANT NEOPLASM OF PROSTATE METASTATIC TO BONE (H): Primary | ICD-10-CM

## 2024-01-08 DIAGNOSIS — G89.3 PAIN FROM BONE METASTASES (H): ICD-10-CM

## 2024-01-08 DIAGNOSIS — C79.51 PAIN FROM BONE METASTASES (H): ICD-10-CM

## 2024-01-08 DIAGNOSIS — R19.7 DIARRHEA, UNSPECIFIED TYPE: ICD-10-CM

## 2024-01-08 DIAGNOSIS — C79.51 MALIGNANT NEOPLASM OF PROSTATE METASTATIC TO BONE (H): Primary | ICD-10-CM

## 2024-01-08 DIAGNOSIS — G62.0 CHEMOTHERAPY-INDUCED PERIPHERAL NEUROPATHY (H): ICD-10-CM

## 2024-01-08 LAB
ALBUMIN SERPL BCG-MCNC: 3.9 G/DL (ref 3.5–5.2)
ALP SERPL-CCNC: 159 U/L (ref 40–150)
ALT SERPL W P-5'-P-CCNC: 13 U/L (ref 0–70)
ANION GAP SERPL CALCULATED.3IONS-SCNC: 9 MMOL/L (ref 7–15)
AST SERPL W P-5'-P-CCNC: 18 U/L (ref 0–45)
BASOPHILS # BLD AUTO: 0 10E3/UL (ref 0–0.2)
BASOPHILS NFR BLD AUTO: 1 %
BILIRUB SERPL-MCNC: 0.4 MG/DL
BUN SERPL-MCNC: 11.6 MG/DL (ref 8–23)
CALCIUM SERPL-MCNC: 8.6 MG/DL (ref 8.8–10.2)
CHLORIDE SERPL-SCNC: 108 MMOL/L (ref 98–107)
CREAT SERPL-MCNC: 0.91 MG/DL (ref 0.67–1.17)
DEPRECATED HCO3 PLAS-SCNC: 25 MMOL/L (ref 22–29)
EGFRCR SERPLBLD CKD-EPI 2021: >90 ML/MIN/1.73M2
EOSINOPHIL # BLD AUTO: 0 10E3/UL (ref 0–0.7)
EOSINOPHIL NFR BLD AUTO: 0 %
ERYTHROCYTE [DISTWIDTH] IN BLOOD BY AUTOMATED COUNT: 16.4 % (ref 10–15)
GLUCOSE SERPL-MCNC: 96 MG/DL (ref 70–99)
HCT VFR BLD AUTO: 31.9 % (ref 40–53)
HGB BLD-MCNC: 10.4 G/DL (ref 13.3–17.7)
IMM GRANULOCYTES # BLD: 0.1 10E3/UL
IMM GRANULOCYTES NFR BLD: 2 %
LYMPHOCYTES # BLD AUTO: 0.6 10E3/UL (ref 0.8–5.3)
LYMPHOCYTES NFR BLD AUTO: 17 %
MCH RBC QN AUTO: 29.5 PG (ref 26.5–33)
MCHC RBC AUTO-ENTMCNC: 32.6 G/DL (ref 31.5–36.5)
MCV RBC AUTO: 91 FL (ref 78–100)
MONOCYTES # BLD AUTO: 0.5 10E3/UL (ref 0–1.3)
MONOCYTES NFR BLD AUTO: 15 %
NEUTROPHILS # BLD AUTO: 2.2 10E3/UL (ref 1.6–8.3)
NEUTROPHILS NFR BLD AUTO: 65 %
NRBC # BLD AUTO: 0 10E3/UL
NRBC BLD AUTO-RTO: 0 /100
PLATELET # BLD AUTO: 163 10E3/UL (ref 150–450)
POTASSIUM SERPL-SCNC: 3.9 MMOL/L (ref 3.4–5.3)
PROT SERPL-MCNC: 6.3 G/DL (ref 6.4–8.3)
PSA SERPL DL<=0.01 NG/ML-MCNC: 3.55 NG/ML (ref 0–4.5)
RBC # BLD AUTO: 3.52 10E6/UL (ref 4.4–5.9)
SODIUM SERPL-SCNC: 142 MMOL/L (ref 135–145)
WBC # BLD AUTO: 3.4 10E3/UL (ref 4–11)

## 2024-01-08 PROCEDURE — 99214 OFFICE O/P EST MOD 30 MIN: CPT

## 2024-01-08 PROCEDURE — 250N000011 HC RX IP 250 OP 636

## 2024-01-08 PROCEDURE — 84153 ASSAY OF PSA TOTAL: CPT

## 2024-01-08 PROCEDURE — 96413 CHEMO IV INFUSION 1 HR: CPT

## 2024-01-08 PROCEDURE — 258N000003 HC RX IP 258 OP 636

## 2024-01-08 PROCEDURE — 99213 OFFICE O/P EST LOW 20 MIN: CPT | Mod: 25

## 2024-01-08 PROCEDURE — 96367 TX/PROPH/DG ADDL SEQ IV INF: CPT

## 2024-01-08 PROCEDURE — 96375 TX/PRO/DX INJ NEW DRUG ADDON: CPT

## 2024-01-08 PROCEDURE — 250N000011 HC RX IP 250 OP 636: Performed by: STUDENT IN AN ORGANIZED HEALTH CARE EDUCATION/TRAINING PROGRAM

## 2024-01-08 PROCEDURE — 36591 DRAW BLOOD OFF VENOUS DEVICE: CPT

## 2024-01-08 PROCEDURE — 85025 COMPLETE CBC W/AUTO DIFF WBC: CPT

## 2024-01-08 PROCEDURE — 258N000003 HC RX IP 258 OP 636: Performed by: STUDENT IN AN ORGANIZED HEALTH CARE EDUCATION/TRAINING PROGRAM

## 2024-01-08 PROCEDURE — 80053 COMPREHEN METABOLIC PANEL: CPT

## 2024-01-08 RX ORDER — METHYLPREDNISOLONE SODIUM SUCCINATE 125 MG/2ML
125 INJECTION, POWDER, LYOPHILIZED, FOR SOLUTION INTRAMUSCULAR; INTRAVENOUS
Status: CANCELLED
Start: 2024-01-08

## 2024-01-08 RX ORDER — HEPARIN SODIUM (PORCINE) LOCK FLUSH IV SOLN 100 UNIT/ML 100 UNIT/ML
5 SOLUTION INTRAVENOUS
Status: CANCELLED | OUTPATIENT
Start: 2024-01-08

## 2024-01-08 RX ORDER — DIPHENHYDRAMINE HYDROCHLORIDE 50 MG/ML
50 INJECTION INTRAMUSCULAR; INTRAVENOUS
Status: CANCELLED
Start: 2024-01-08

## 2024-01-08 RX ORDER — ALBUTEROL SULFATE 0.83 MG/ML
2.5 SOLUTION RESPIRATORY (INHALATION)
Status: CANCELLED | OUTPATIENT
Start: 2024-01-08

## 2024-01-08 RX ORDER — HEPARIN SODIUM,PORCINE 10 UNIT/ML
5-20 VIAL (ML) INTRAVENOUS DAILY PRN
Status: CANCELLED | OUTPATIENT
Start: 2024-01-08

## 2024-01-08 RX ORDER — MEPERIDINE HYDROCHLORIDE 25 MG/ML
25 INJECTION INTRAMUSCULAR; INTRAVENOUS; SUBCUTANEOUS EVERY 30 MIN PRN
Status: CANCELLED | OUTPATIENT
Start: 2024-01-08

## 2024-01-08 RX ORDER — ONDANSETRON 2 MG/ML
8 INJECTION INTRAMUSCULAR; INTRAVENOUS ONCE
Status: COMPLETED | OUTPATIENT
Start: 2024-01-08 | End: 2024-01-08

## 2024-01-08 RX ORDER — PREDNISONE 10 MG/1
10 TABLET ORAL DAILY
Qty: 21 TABLET | Refills: 0 | Status: SHIPPED | OUTPATIENT
Start: 2024-01-08 | End: 2024-01-29

## 2024-01-08 RX ORDER — ONDANSETRON 2 MG/ML
8 INJECTION INTRAMUSCULAR; INTRAVENOUS ONCE
Status: CANCELLED | OUTPATIENT
Start: 2024-01-08

## 2024-01-08 RX ORDER — LORAZEPAM 2 MG/ML
0.5 INJECTION INTRAMUSCULAR EVERY 4 HOURS PRN
Status: CANCELLED | OUTPATIENT
Start: 2024-01-08

## 2024-01-08 RX ORDER — HEPARIN SODIUM (PORCINE) LOCK FLUSH IV SOLN 100 UNIT/ML 100 UNIT/ML
5 SOLUTION INTRAVENOUS ONCE
Status: COMPLETED | OUTPATIENT
Start: 2024-01-08 | End: 2024-01-08

## 2024-01-08 RX ORDER — HEPARIN SODIUM (PORCINE) LOCK FLUSH IV SOLN 100 UNIT/ML 100 UNIT/ML
5 SOLUTION INTRAVENOUS
Status: DISCONTINUED | OUTPATIENT
Start: 2024-01-08 | End: 2024-01-08 | Stop reason: HOSPADM

## 2024-01-08 RX ORDER — EPINEPHRINE 1 MG/ML
0.3 INJECTION, SOLUTION INTRAMUSCULAR; SUBCUTANEOUS EVERY 5 MIN PRN
Status: CANCELLED | OUTPATIENT
Start: 2024-01-08

## 2024-01-08 RX ORDER — ALBUTEROL SULFATE 90 UG/1
1-2 AEROSOL, METERED RESPIRATORY (INHALATION)
Status: CANCELLED
Start: 2024-01-08

## 2024-01-08 RX ADMIN — FAMOTIDINE 20 MG: 10 INJECTION, SOLUTION INTRAVENOUS at 11:08

## 2024-01-08 RX ADMIN — SODIUM CHLORIDE 50 MG: 9 INJECTION, SOLUTION INTRAVENOUS at 11:46

## 2024-01-08 RX ADMIN — Medication 5 ML: at 12:50

## 2024-01-08 RX ADMIN — SODIUM CHLORIDE 250 ML: 9 INJECTION, SOLUTION INTRAVENOUS at 11:05

## 2024-01-08 RX ADMIN — DIPHENHYDRAMINE HYDROCHLORIDE 25 MG: 50 INJECTION, SOLUTION INTRAMUSCULAR; INTRAVENOUS at 11:14

## 2024-01-08 RX ADMIN — Medication 5 ML: at 09:40

## 2024-01-08 RX ADMIN — ONDANSETRON 8 MG: 2 INJECTION INTRAMUSCULAR; INTRAVENOUS at 11:07

## 2024-01-08 ASSESSMENT — PAIN SCALES - GENERAL: PAINLEVEL: NO PAIN (0)

## 2024-01-08 NOTE — PROGRESS NOTES
"    [facility] Oncology Followup  Jan 8, 2024    REASON FOR VISIT: Follow-up for metastatic castration-resistant prostate cancer.        INTERVAL HISTORY:     His energy level fluctuates. Worse the week following chemo, then up and down the subsequent 2 weeks. Pain has been well controlled since starting Fentanyl patch. Numbness in bilateral big and second toe. Sometimes has burning sensation in his feet when he goes to bed.     Maintains adequate intake throughout the 3 week cycle. Has nausea the first week following chemo that becomes more mild with the secone week. Alternates oral antiemetics, takes 6-8 doses total each cycle. Having intermitent diarrhea controlled with PRN imodium. Usually doesn't need more than 3 tablets in one day. Fatigue is worse with diarrhea. No abdominal pain or cramping. Blood pressure running in the 120's systolic at home, but tends to be lower with diarrhea episodes. Maintaining adequate fluid intake.     No urinary concerns. Denies fever, chills or concerns for infection. No shortness of breath, chest pain or cough. Denies skin concerns. No bleeding.       ONCOLOGY HISTORY: Mr. Walter Reyes is a 63 year old gentleman with a metastatic castration-sensitive prostate cancer and incidentally diagnosed stage 3 clear cell RCC (s/p radical R nephrectomy on 3/19/19) which is now 4.5+ years post-therapy without evidence of recurrence. His oncologic history is detailed below.     1. 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 Naylor at [facility]. Prior PSA were 1.4 on 8/16/17, 2.4 on 6/28/16, 2.9 on 6/28/16, and as low as 0.4 on 3/26/2003.   - 1/08/2019: Consultation with Sarah Wilson CNP in Urology clinic. Repeat PSA 9.6.  - 1/16/2019: MRI prostate with contrast - \"This examination is characterized as " "PIRADS 5- very high probability. Clinically significant cancer is highly likely to be present. There is a large, invasive mass arising from the right peripheral zone and extending into the neurovascular bundle, seminal vesicle, and along the anterolateral right mesorectal fascia. Metastatic right external iliac lymph node. Metastatic lesion in the posterior/superior right acetabulum.\"  - 1/25/2019: CT abdomen and pelvis with IV contrast - \"1. Heterogeneous enhancing mass posteriorly in the upper pole of the right kidney measures 4.5 x 5.8 x 5.7 cm (AP by transverse by craniocaudal). It has a small nodular component extending posterior medially which abuts the right psoas muscle. This nodular extension measures 2.1 x 2.1 cm. Minimal stranding about the mass. No definite thrombus within the right renal vein. This renal mass is compatible with a renal cell carcinoma. A paraaortic lymph node situated immediately posterior to the left renal vein measures 1.1 cm in short axis, suspicious for metastasis. 2. A 2 cm right external iliac lymph node is also suspicious for metastases. 3. Multiple sclerotic osseous lesions suspicious for metastases. These include 0.8 and 0.6 cm sclerotic lesions laterally in the right iliac wing (images 53 and 62 respectively). Ill-defined groundglass density laterally in the right acetabulum measuring 1.7 x 1.2 cm corresponds with the lesion identified on MRI. A 0.4 cm groundglass density in the left acetabulum. Sclerotic lesion in the left femoral neck measures 0.9 x 1.6 cm (image 81). Sclerotic metastases would be more compatible with prostate metastases. 4. A 3 subcentimeter hepatic lesions are indeterminate. Metastases would be a consideration. These can be further characterized with liver MRI.\"  - 1/25/2019: NM bone scan - \"There is focal bony uptake in the left femoral neck, right acetabulum, right of midline at the S1 or L5 level of the spine, within multiple bilateral ribs, in the C7 or " "T1 level of the spine, and anteriorly within the skull. Findings are suspicious for metastases.\"  - 1/31/19: CT chest with contrast - \" No suspicious nodules in the chest.  Stable appearance of a 5.8 cm right renal mass concerning for renal carcinoma until proven otherwise.  Stable indeterminant subcentimeter hypodensities in the liver.  Multifocal osteoblastic metastasis including 2.5 cm lesion in the right fifth rib posteriorly and a 1.6 cm lesion in the right third rib posteriorly. No lytic lesions identified.\"  - 2/6/19: CT-guided right sclerotic fifth rib lesion biopsy - \"Metastatic carcinoma, consistent with prostate primary.  Immunohistochemical stains performed show the metastatic carcinoma stains positive for NKX3.1 (prostate marker), negative for STACIE 3 and PAX8, which supports the above diagnosis.\"  - 2/15/19: Started Casodex 50mg every day and consented for the biobanking protocol. 3/18/19 - stopped Casodex.  - 2/19/19: Case discussed in tumor board - recommendation for nephectomy for suspected malignant right renal mass.   - 2/26/19: Started Lupron 22.5mg every 3 months.   - 5/8/19: Started Docetaxel 75mg/m2 IV every 3 weeks. 06/18/19 - cycle 3, 7/10/19 - cycle 4, 07/31/19 - cycle 5, 08/21/19 - cycle 6.   - 10/2/19: Restaging CT CAP and NM Bone Scan showed improved osseous disease, no evidence of recurrent or new metastatic disease.  - 1/5/20: Restaging CT CAP and NM Bone Scan showed stable osseous disease, no evidence of recurrent or new metastatic disease.  - 4/6/20: NM bone scan with slightly improved uptake in known skeletal mets. CT C/A/P with contrast with stable osseous mets, no yusuf enlargement, no new visceral mets etc.  - 04/08/20: Zometa every 3 months.  - 10/5/20: CT C/A/P with contrast and NM bone scan - SD.   - 1/4/21: CT C/A/P with contrast and NM bone scan - SD but slight increase in right 5th rib tracer uptake and in posterior L5 sclerosis. 1/6/21  PSA = 0.29  - 03/29/21: CT C/A/P with " "contrast - single new right sacral 8mm bone lesion (suspicious), other bone mets stable. No visceral/yusuf disease. Nephrectomy site without recurrence. NM bone scan - SD but \"increased uptake associated with the prior lesions of the lower  cervical spine, posterior right fourth rib and right L5 posterior arch elements. Otherwise unchanged uptake associated with the proximal left femur and left anterior fourth rib. Similar uptake of the left halux, possibly secondary to degenerative osteoarthritis or gout.\"    3/31/21 PSA = 0.44  7/7/21  PSA = 0.47  11/2021 PSA = 1.05  -- Start XTANDI  12/16/21 PSA =0.22  2/11/22 PSA= 0.50  3/22/22 PSA=0.72  5/5/2022 PSA=1.79  7/11/2022 PSA=2.16 --Start cycle 1 docetaxel   8/22/22 PSA = 1.36  9/12/22 PSA = 1.09  10/24/22 Cycle #6 of Docetaxel. End of treatment  1/9/23  PSA =0.66  3/20/23  PSA=1.54  4/21/23 PSA = 1.81  T=15  5/22/23 C1 Pluvicto   06/07/23          PSA = 1.42  7/18/23 PSA=1.75, C2 Pluvicto   8/28/23 Transfer of care initial visit for Tabby.  PSA 2.06.  C3 Pluvicto scheduled for 8/30.  Proceed with dose as planned.  MR L femur, ortho referral, PSMA PET ordered for prior to follow-up.    10/2/23 Palliative RT to L femur metastasis complete.  PSMA PET with mixed response.  PSA 2.21.  RT to L femur pending.  Continue with Dose #4 of Pluvicto despite mixed response to therapy.  Dex course for possible pain flare with RT.    11/8/23  Follow-up prior to Dose #5 Pluvicto.  More pain in chest/ribs/back. PSA 4.38.  Testosterone=3.  Rad onc referral for ribs.  Biopsy progressive lesion for progression on Pluvicto.  Cabazitaxel scheduled for follow-up appointment.  Pending Tempus testing on biopsy sample versus peripheral blood.   11/27/23 Bx completed from R iliac crest but unrevealing for either PCa or RCC.  Start cabazitaxel C1D1.  PSA 2.81.        Radiation history:   -C7         3,000 cGy       06 X      8/05/2021        8/18/2021        13       10  -2 Sacrum          2,500 " "cGy       18 X      4/12/2022        4/18/2022         6           -Sacrum retreat               700 cGy        18 X      2/28/2023        2/28/2023  -Left femur to 2000 cGy in 5 fractions, completed 10/10/2023   -bilateral posterior chest wall at an area of osseous involvement of the ribs and adjacent T4 and 5 spine, to be delivered to 2000 cGy in 5 fractions.  completed 12/13-12/19/23.      2. Stage III ([id number]), grade 3 of 4, clear-cell RCC of right kidney:  - Incidentally diagnosed as above.   - Underwent curative-intent robotic right radical nephrectomy with Dr. Wesley Cleveland on 3/19/19. No tumor spillage per op report.   - Path showed: \"Histologic Type: Clear cell renal cell carcinoma; Sarcomatoid Features: Not identified; Histologic Grade: Nucleolar grade 3 (WHO/ISUP). Extent Tumor Size: 4.3 cm. Microscopic Tumor Extension: Tumor extension into renal sinus (in vascular structures). Margins: Negative. Tumor Necrosis: Present; focal. Lymph-Vascular Invasion: Not identified.  Pathologic Staging (pTNM) Primary Tumor (pT): pT3a: Tumor extends into renal vein branches/renal sinus. Regional Lymph Nodes (pN): pNX. Number of Lymph Nodes Examined: 0 Distant Metastasis (pM): pM N/A.\"  - Restaging scans as above.  No current concerns for recurrence.      PAST MEDICAL HISTORY:  Past Medical History:   Diagnosis Date    Cancer of kidney, right (H)     Complication of anesthesia     slow wakeup     Malignant neoplasm of prostate metastatic to bone (H) 2/14/2019    Thyroid disease      CURRENT MEDICATIONS:   Current Outpatient Medications:     atorvastatin (LIPITOR) 20 MG tablet, Take 60 mg by mouth daily , Disp: , Rfl:     calcium citrate-vitamin D (CITRACAL) 315-250 MG-UNIT TABS per tablet, Take 650 mg by mouth 2 times daily , Disp: , Rfl:     cycloSPORINE (RESTASIS) 0.05 % ophthalmic emulsion, Place 1 drop into both eyes 2 times daily , Disp: , Rfl:     dexAMETHasone (DECADRON) 4 MG tablet, Take 1 tablet (4 mg) by " mouth daily, Disp: 7 tablet, Rfl: 2    fentaNYL (DURAGESIC) 25 mcg/hr 72 hr patch, Place 1 patch onto the skin every 72 hours remove old patch., Disp: 10 patch, Rfl: 0    gabapentin (NEURONTIN) 300 MG capsule, TAKE ONE CAPSULE BY MOUTH TWICE DAILY AND 2 CAPSULES AT BEDTIME, Disp: 120 capsule, Rfl: 3    Glucosamine-Chondroit-Vit C-Mn (GLUCOSAMINE 1500 COMPLEX) CAPS, Take 1 capsule by mouth daily (Patient not taking: Reported on 12/19/2023), Disp: , Rfl:     levothyroxine (SYNTHROID/LEVOTHROID) 112 MCG tablet, Take 112 mcg by mouth daily, Disp: , Rfl:     lidocaine, viscous, (XYLOCAINE) 2 % solution, Swish and swallow 15 mLs in mouth every 3 hours as needed for pain (before meals and at bedtime) ; Max 8 doses/24 hour period., Disp: 100 mL, Rfl: 1    loratadine (CLARITIN) 10 MG tablet, Take 10 mg by mouth daily (Patient not taking: Reported on 12/19/2023), Disp: , Rfl:     Multiple Vitamins-Minerals (MULTIVITAMIN ADULT EXTRA C PO), Take 1 tablet by mouth every 24 hours, Disp: , Rfl:     naloxone (NARCAN) 4 MG/0.1ML nasal spray, Spray 1 spray (4 mg) into one nostril alternating nostrils as needed for opioid reversal every 2-3 minutes until assistance arrives (Patient not taking: Reported on 12/19/2023), Disp: 0.2 mL, Rfl: 1    Nutritional Supplements (SALMON OIL) CAPS, Take 2 capsules by mouth daily , Disp: , Rfl:     omeprazole (PRILOSEC) 20 MG DR capsule, Take 20 mg by mouth daily, Disp: , Rfl:     ondansetron (ZOFRAN) 8 MG tablet, Take 1 tablet (8 mg) by mouth every 8 hours as needed for nausea, Disp: 30 tablet, Rfl: 4    oxyCODONE (ROXICODONE) 5 MG tablet, Take 1-2 tablets (5-10 mg) by mouth every 3 hours as needed for severe pain, Disp: 90 tablet, Rfl: 0    predniSONE (DELTASONE) 10 MG tablet, Take 1 tablet (10 mg) by mouth daily for 21 days, Disp: 21 tablet, Rfl: 0    prochlorperazine (COMPAZINE) 10 MG tablet, Take 1 tablet (10 mg) by mouth every 6 hours as needed for nausea or vomiting, Disp: 30 tablet, Rfl: 2     prochlorperazine (COMPAZINE) 10 MG tablet, Take 1 tablet (10 mg) by mouth every 6 hours as needed for nausea or vomiting, Disp: 90 tablet, Rfl: 1    tamsulosin (FLOMAX) 0.4 MG capsule, Take 1 capsule (0.4 mg) by mouth daily, Disp: 30 capsule, Rfl: 3    triamterene-HCTZ (MAXZIDE-25) 37.5-25 MG tablet, Take 1 tablet by mouth daily (Patient not taking: Reported on 12/19/2023), Disp: , Rfl:       Physical Exam:   /50 (BP Location: Right arm, Patient Position: Sitting, Cuff Size: Adult Large)   Pulse 57   Temp 98.3  F (36.8  C) (Oral)   Resp 18   Wt 119.6 kg (263 lb 9.6 oz)   SpO2 97%   BMI 36.76 kg/m    Wt Readings from Last 10 Encounters:   01/08/24 119.6 kg (263 lb 9.6 oz)   12/20/23 117.9 kg (260 lb)   12/19/23 118.4 kg (261 lb)   12/13/23 118 kg (260 lb 1.6 oz)   11/27/23 117.9 kg (259 lb 14.4 oz)   11/15/23 117.9 kg (260 lb)   11/08/23 120.7 kg (266 lb 1.6 oz)   10/02/23 118.4 kg (261 lb)   09/20/23 116.1 kg (256 lb)   09/05/23 117 kg (258 lb)   General: The patient is a pleasant male in no acute distress.  HEENT: EOMI. Sclerae are anicteric.   Lymph: Neck is supple with no lymphadenopathy in the cervical or supraclavicular areas.   Heart: Regular rate and rhythm.   Lungs: Clear to auscultation bilaterally.   Abdomen: Bowel sounds present, soft, nontender with no palpable hepatosplenomegaly or masses.   Extremities: No lower extremity edema noted bilaterally.   Neuro: Cranial nerves II through XII are grossly intact.  Skin: No rashes, petechiae, or bruising noted on exposed skin.  Psych: affect bright, alert and oriented          LABORATORY DATA:  Most Recent 3 CBC's:  Recent Labs   Lab Test 01/08/24  0945 12/19/23  1000 11/27/23  1207   WBC 3.4* 3.2* 5.9   HGB 10.4* 11.2* 11.0*   MCV 91 88 89    171 243   ANEUTAUTO 2.2 2.2 4.0     Most Recent 3 BMP's:  Recent Labs   Lab Test 01/08/24  0945 12/19/23  1000 11/27/23  1207    140 141   POTASSIUM 3.9 4.0 3.6   CHLORIDE 108* 107 107   CO2 25 25  "24   BUN 11.6 12.8 17.6   CR 0.91 0.88 1.02   ANIONGAP 9 8 10   BETHANY 8.6* 8.6* 8.8   GLC 96 108* 105*   PROTTOTAL 6.3* 6.5 6.7   ALBUMIN 3.9 3.9 3.8    Most Recent 3 LFT's:  Recent Labs   Lab Test 01/08/24  0945 12/19/23  1000 11/27/23  1207   AST 18 19 22   ALT 13 13 15   ALKPHOS 159* 210* 246*   BILITOTAL 0.4 0.3 0.2       I reviewed the above labs today.    PSA trend, see oncology history.      R iliac crest biopsy from 11/21/23 without evidence for malignancy.          ASSESSMENT & PLAN: Mr. Reyes is a delightful 63 year old gentleman with metastatic castration sensitive prostate adenocarcinoma as well as an incidentally diagnosed stage III clear-cell renal cell carcinoma of the right kidney (s/p radical R nephrectomy 3/19/19), who is here for a follow-up visit and initiation of docetafor now metastatic castration-resistant prostate cancer.     1. Metastatic castration-resistant prostate cancer, with involvement of the bones and RPLN:   - Patient met the criteria for CHAARTED \"high-volume\" metastatic hormone-sensitive prostate cancer. He opted for docetaxel in combination with ADT (per JOSEFA, [id number], KARLA). He was subsequently treated with docetaxel x6 doses in the [facility] setting and enzalutamide.  He initiated Pluvicto in May 2023 with an initial slight decline in PSA, but this began to rise just prior to Cycle 3.  Given his XR evidence of progression in L femur and PSA rise, we re-evaluated his progression with PSMA PET scan in September 2023, with note of mixed response.  Reconsented for  [employer] on 8/28/23.  Due for 3 month collection at end of November 2023.  Canceled Pluvicto Dose for November due to symptomatic progression with PSA rise. PSA is labile. Biopsy results from 11/21 are pending for evaluation of NEPC vs small cell given progression without a significant rise in PSA.  Will also send peripheral blood Tempus for evaluation of targetable mutations given the negative result from " the 11/21/23 bone biopsy.   -Zometa next due 1/24/2024 along with Lupron.   -Palliative to manage further opiate refills. Pain is well controlled with fentanyl patch.   -Dexamethasone burst for RT and one on hand if needed for pain flares.      -Cabazitaxel started 11/27/23 given progression on Pluvicto. Tolerating treamtnet well with fatigue, mild nausea and diarrhea. Continue Zofran and Compazine as needed, patient to call clinic if these are not sufficient in controlling nausea.     -Labs reviewed today with no concerns, ok to proceed with Cycle 3. Return to clinic in 3 weeks for cycle 4 with labs and TAHMINA prior.     2. Bone metastases:   - Noted to have osseous metastatic disease at the time of diagnosis. S/p radiation to C spine and sacrum (Re-irradiation to sacrum).   - worsening radiculopathy down starting in the low back and radiating down the left leg in June. MRI read was without new/acute abnormalities  - Pain continues to fluctuate but overall well controlled/managable   - pain mgmt per palliative care, pain is now well controlled with fentanyl patch.   - Encouraged to continue calcium and vitamin D supplementation; continue Zometa 4mg IV every ~3 months. Due next in January 2024, will give with next infusion 1/29.   - RT to L femur 10/2023.  RT for bilateral posterior ribs 12/2023 with Dr. Albert.  - Added to trial list for [id number], [id number].       3. Stage 3, UISS-high risk ccRCC: s/p R nephrectomy   - Nephrectomy 3/19/19 and noted incidentally.  He is now 4.5 years post-op without evidence for recurrence.   - At this point, there is a minimal risk of recurrence of his RCC given the time since surgery without the use of adjuvant immunotherapy. There is no suspicious adenopathy or other features on his most recent imaging studies.    - Will continue to monitor with imaging planned for prostate cancer.        37 minutes spent on the date of the encounter doing chart review, review of test results,  interpretation of tests, patient visit, and documentation     LATESHA Humphreys CNP

## 2024-01-08 NOTE — PATIENT INSTRUCTIONS
[facility] Triage and after hours / weekends / holidays:  [phone]    Please call the triage or after hours line if you experience a temperature greater than or equal to 100.4, shaking chills, have uncontrolled nausea, vomiting and/or diarrhea, dizziness, shortness of breath, chest pain, bleeding, unexplained bruising, or if you have any other new/concerning symptoms, questions or concerns.      If you are having any concerning symptoms or wish to speak to a provider before your next infusion visit, please call triage to notify them so we can adequately serve you.     If you need a refill on a narcotic prescription or other medication, please call before your infusion appointment.

## 2024-01-08 NOTE — NURSING NOTE
"Oncology Rooming Note    January 8, 2024 9:52 AM   Walter Reyes is a 63 year old male who presents for:    Chief Complaint   Patient presents with    Oncology Clinic Visit     Prostate CA    Port Draw     Labs drawn via port by RN in lab. VS taken.      Initial Vitals: /50 (BP Location: Right arm, Patient Position: Sitting, Cuff Size: Adult Large)   Pulse 57   Temp 98.3  F (36.8  C) (Oral)   Resp 18   Wt 119.6 kg (263 lb 9.6 oz)   SpO2 97%   BMI 36.76 kg/m   Estimated body mass index is 36.76 kg/m  as calculated from the following:    Height as of 12/20/23: 1.803 m (5' 11\").    Weight as of this encounter: 119.6 kg (263 lb 9.6 oz). Body surface area is 2.45 meters squared.  No Pain (0) Comment: Data Unavailable   No LMP for male patient.  Allergies reviewed: Yes  Medications reviewed: Yes    Medications: MEDICATION REFILLS NEEDED TODAY. Provider was notified.  Pharmacy name entered into EPIC:    [facility] [place], [place] - [address] [facility] - [place], [place] - [address] [facility] [place], [place] - [id number]  [facility] [place], [place] - [address] [address]    Frailty Screening:   Is the patient here for a new oncology consult visit in cancer care? 2. No      Clinical concerns: refill prednisone   Sherry was notified.      Evangelina Shankar              "

## 2024-01-08 NOTE — LETTER
1/8/2024         RE: Walter Reyes  [address] [facility] [id number]        Dear Colleague,    Thank you for referring your patient, Walter Reyes, to the [facility]. Please see a copy of my visit note below.        [facility] Oncology Followup  Jan 8, 2024    REASON FOR VISIT: Follow-up for metastatic castration-resistant prostate cancer.        INTERVAL HISTORY:     His energy level fluctuates. Worse the week following chemo, then up and down the subsequent 2 weeks. Pain has been well controlled since starting Fentanyl patch. Numbness in bilateral big and second toe. Sometimes has burning sensation in his feet when he goes to bed.     Maintains adequate intake throughout the 3 week cycle. Has nausea the first week following chemo that becomes more mild with the secone week. Alternates oral antiemetics, takes 6-8 doses total each cycle. Having intermitent diarrhea controlled with PRN imodium. Usually doesn't need more than 3 tablets in one day. Fatigue is worse with diarrhea. No abdominal pain or cramping. Blood pressure running in the 120's systolic at home, but tends to be lower with diarrhea episodes. Maintaining adequate fluid intake.     No urinary concerns. Denies fever, chills or concerns for infection. No shortness of breath, chest pain or cough. Denies skin concerns. No bleeding.       ONCOLOGY HISTORY: Mr. Walter Reyes is a 63 year old gentleman with a metastatic castration-sensitive prostate cancer and incidentally diagnosed stage 3 clear cell RCC (s/p radical R nephrectomy on 3/19/19) which is now 4.5+ years post-therapy without evidence of recurrence. His oncologic history is detailed below.     1. 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 Naylor at  "[facility]. Prior PSA were 1.4 on 8/16/17, 2.4 on 6/28/16, 2.9 on 6/28/16, and as low as 0.4 on 3/26/2003.   - 1/08/2019: Consultation with Sarah Wilson CNP in Urology clinic. Repeat PSA 9.6.  - 1/16/2019: MRI prostate with contrast - \"This examination is characterized as PIRADS 5- very high probability. Clinically significant cancer is highly likely to be present. There is a large, invasive mass arising from the right peripheral zone and extending into the neurovascular bundle, seminal vesicle, and along the anterolateral right mesorectal fascia. Metastatic right external iliac lymph node. Metastatic lesion in the posterior/superior right acetabulum.\"  - 1/25/2019: CT abdomen and pelvis with IV contrast - \"1. Heterogeneous enhancing mass posteriorly in the upper pole of the right kidney measures 4.5 x 5.8 x 5.7 cm (AP by transverse by craniocaudal). It has a small nodular component extending posterior medially which abuts the right psoas muscle. This nodular extension measures 2.1 x 2.1 cm. Minimal stranding about the mass. No definite thrombus within the right renal vein. This renal mass is compatible with a renal cell carcinoma. A paraaortic lymph node situated immediately posterior to the left renal vein measures 1.1 cm in short axis, suspicious for metastasis. 2. A 2 cm right external iliac lymph node is also suspicious for metastases. 3. Multiple sclerotic osseous lesions suspicious for metastases. These include 0.8 and 0.6 cm sclerotic lesions laterally in the right iliac wing (images 53 and 62 respectively). Ill-defined groundglass density laterally in the right acetabulum measuring 1.7 x 1.2 cm corresponds with the lesion identified on MRI. A 0.4 cm groundglass density in the left acetabulum. Sclerotic lesion in the left femoral neck measures 0.9 x 1.6 cm (image 81). Sclerotic metastases would be more compatible with prostate metastases. 4. A 3 subcentimeter hepatic lesions are indeterminate. " "Metastases would be a consideration. These can be further characterized with liver MRI.\"  - 1/25/2019: NM bone scan - \"There is focal bony uptake in the left femoral neck, right acetabulum, right of midline at the S1 or L5 level of the spine, within multiple bilateral ribs, in the C7 or T1 level of the spine, and anteriorly within the skull. Findings are suspicious for metastases.\"  - 1/31/19: CT chest with contrast - \" No suspicious nodules in the chest.  Stable appearance of a 5.8 cm right renal mass concerning for renal carcinoma until proven otherwise.  Stable indeterminant subcentimeter hypodensities in the liver.  Multifocal osteoblastic metastasis including 2.5 cm lesion in the right fifth rib posteriorly and a 1.6 cm lesion in the right third rib posteriorly. No lytic lesions identified.\"  - 2/6/19: CT-guided right sclerotic fifth rib lesion biopsy - \"Metastatic carcinoma, consistent with prostate primary.  Immunohistochemical stains performed show the metastatic carcinoma stains positive for NKX3.1 (prostate marker), negative for STACIE 3 and PAX8, which supports the above diagnosis.\"  - 2/15/19: Started Casodex 50mg every day and consented for the biobanking protocol. 3/18/19 - stopped Casodex.  - 2/19/19: Case discussed in tumor board - recommendation for nephectomy for suspected malignant right renal mass.   - 2/26/19: Started Lupron 22.5mg every 3 months.   - 5/8/19: Started Docetaxel 75mg/m2 IV every 3 weeks. 06/18/19 - cycle 3, 7/10/19 - cycle 4, 07/31/19 - cycle 5, 08/21/19 - cycle 6.   - 10/2/19: Restaging CT CAP and NM Bone Scan showed improved osseous disease, no evidence of recurrent or new metastatic disease.  - 1/5/20: Restaging CT CAP and NM Bone Scan showed stable osseous disease, no evidence of recurrent or new metastatic disease.  - 4/6/20: NM bone scan with slightly improved uptake in known skeletal mets. CT C/A/P with contrast with stable osseous mets, no yusuf enlargement, no new visceral " "mets etc.  - 04/08/20: Zometa every 3 months.  - 10/5/20: CT C/A/P with contrast and NM bone scan - SD.   - 1/4/21: CT C/A/P with contrast and NM bone scan - SD but slight increase in right 5th rib tracer uptake and in posterior L5 sclerosis. 1/6/21  PSA = 0.29  - 03/29/21: CT C/A/P with contrast - single new right sacral 8mm bone lesion (suspicious), other bone mets stable. No visceral/yusuf disease. Nephrectomy site without recurrence. NM bone scan - SD but \"increased uptake associated with the prior lesions of the lower  cervical spine, posterior right fourth rib and right L5 posterior arch elements. Otherwise unchanged uptake associated with the proximal left femur and left anterior fourth rib. Similar uptake of the left halux, possibly secondary to degenerative osteoarthritis or gout.\"    3/31/21 PSA = 0.44  7/7/21  PSA = 0.47  11/2021 PSA = 1.05  -- Start XTANDI  12/16/21 PSA =0.22  2/11/22 PSA= 0.50  3/22/22 PSA=0.72  5/5/2022 PSA=1.79  7/11/2022 PSA=2.16 --Start cycle 1 docetaxel   8/22/22 PSA = 1.36  9/12/22 PSA = 1.09  10/24/22 Cycle #6 of Docetaxel. End of treatment  1/9/23  PSA =0.66  3/20/23  PSA=1.54  4/21/23 PSA = 1.81  T=15  5/22/23 C1 Pluvicto   06/07/23          PSA = 1.42  7/18/23 PSA=1.75, C2 Pluvicto   8/28/23 Transfer of care initial visit for Zorko.  PSA 2.06.  C3 Pluvicto scheduled for 8/30.  Proceed with dose as planned.  MR DELORIS femur, ortho referral, PSMA PET ordered for prior to follow-up.    10/2/23 Palliative RT to L femur metastasis complete.  PSMA PET with mixed response.  PSA 2.21.  RT to L femur pending.  Continue with Dose #4 of Pluvicto despite mixed response to therapy.  Dex course for possible pain flare with RT.    11/8/23  Follow-up prior to Dose #5 Pluvicto.  More pain in chest/ribs/back. PSA 4.38.  Testosterone=3.  Rad onc referral for ribs.  Biopsy progressive lesion for progression on Pluvicto.  Cabazitaxel scheduled for follow-up appointment.  Pending Tempus testing on " "biopsy sample versus peripheral blood.   11/27/23 Bx completed from R iliac crest but unrevealing for either PCa or RCC.  Start cabazitaxel C1D1.  PSA 2.81.        Radiation history:   -C7         3,000 cGy       06 X      8/05/2021        8/18/2021        13       10  -2 Sacrum          2,500 cGy       18 X      4/12/2022        4/18/2022         6           -Sacrum retreat               700 cGy        18 X      2/28/2023        2/28/2023  -Left femur to 2000 cGy in 5 fractions, completed 10/10/2023   -bilateral posterior chest wall at an area of osseous involvement of the ribs and adjacent T4 and 5 spine, to be delivered to 2000 cGy in 5 fractions.  completed 12/13-12/19/23.      2. Stage III ([id number]), grade 3 of 4, clear-cell RCC of right kidney:  - Incidentally diagnosed as above.   - Underwent curative-intent robotic right radical nephrectomy with Dr. Wesley Cleveland on 3/19/19. No tumor spillage per op report.   - Path showed: \"Histologic Type: Clear cell renal cell carcinoma; Sarcomatoid Features: Not identified; Histologic Grade: Nucleolar grade 3 (WHO/ISUP). Extent Tumor Size: 4.3 cm. Microscopic Tumor Extension: Tumor extension into renal sinus (in vascular structures). Margins: Negative. Tumor Necrosis: Present; focal. Lymph-Vascular Invasion: Not identified.  Pathologic Staging (pTNM) Primary Tumor (pT): pT3a: Tumor extends into renal vein branches/renal sinus. Regional Lymph Nodes (pN): pNX. Number of Lymph Nodes Examined: 0 Distant Metastasis (pM): pM N/A.\"  - Restaging scans as above.  No current concerns for recurrence.      PAST MEDICAL HISTORY:  Past Medical History:   Diagnosis Date    Cancer of kidney, right (H)     Complication of anesthesia     slow wakeup     Malignant neoplasm of prostate metastatic to bone (H) 2/14/2019    Thyroid disease      CURRENT MEDICATIONS:   Current Outpatient Medications:     atorvastatin (LIPITOR) 20 MG tablet, Take 60 mg by mouth daily , Disp: , Rfl:     calcium " citrate-vitamin D (CITRACAL) 315-250 MG-UNIT TABS per tablet, Take 650 mg by mouth 2 times daily , Disp: , Rfl:     cycloSPORINE (RESTASIS) 0.05 % ophthalmic emulsion, Place 1 drop into both eyes 2 times daily , Disp: , Rfl:     dexAMETHasone (DECADRON) 4 MG tablet, Take 1 tablet (4 mg) by mouth daily, Disp: 7 tablet, Rfl: 2    fentaNYL (DURAGESIC) 25 mcg/hr 72 hr patch, Place 1 patch onto the skin every 72 hours remove old patch., Disp: 10 patch, Rfl: 0    gabapentin (NEURONTIN) 300 MG capsule, TAKE ONE CAPSULE BY MOUTH TWICE DAILY AND 2 CAPSULES AT BEDTIME, Disp: 120 capsule, Rfl: 3    Glucosamine-Chondroit-Vit C-Mn (GLUCOSAMINE 1500 COMPLEX) CAPS, Take 1 capsule by mouth daily (Patient not taking: Reported on 12/19/2023), Disp: , Rfl:     levothyroxine (SYNTHROID/LEVOTHROID) 112 MCG tablet, Take 112 mcg by mouth daily, Disp: , Rfl:     lidocaine, viscous, (XYLOCAINE) 2 % solution, Swish and swallow 15 mLs in mouth every 3 hours as needed for pain (before meals and at bedtime) ; Max 8 doses/24 hour period., Disp: 100 mL, Rfl: 1    loratadine (CLARITIN) 10 MG tablet, Take 10 mg by mouth daily (Patient not taking: Reported on 12/19/2023), Disp: , Rfl:     Multiple Vitamins-Minerals (MULTIVITAMIN ADULT EXTRA C PO), Take 1 tablet by mouth every 24 hours, Disp: , Rfl:     naloxone (NARCAN) 4 MG/0.1ML nasal spray, Spray 1 spray (4 mg) into one nostril alternating nostrils as needed for opioid reversal every 2-3 minutes until assistance arrives (Patient not taking: Reported on 12/19/2023), Disp: 0.2 mL, Rfl: 1    Nutritional Supplements (SALMON OIL) CAPS, Take 2 capsules by mouth daily , Disp: , Rfl:     omeprazole (PRILOSEC) 20 MG DR capsule, Take 20 mg by mouth daily, Disp: , Rfl:     ondansetron (ZOFRAN) 8 MG tablet, Take 1 tablet (8 mg) by mouth every 8 hours as needed for nausea, Disp: 30 tablet, Rfl: 4    oxyCODONE (ROXICODONE) 5 MG tablet, Take 1-2 tablets (5-10 mg) by mouth every 3 hours as needed for severe  pain, Disp: 90 tablet, Rfl: 0    predniSONE (DELTASONE) 10 MG tablet, Take 1 tablet (10 mg) by mouth daily for 21 days, Disp: 21 tablet, Rfl: 0    prochlorperazine (COMPAZINE) 10 MG tablet, Take 1 tablet (10 mg) by mouth every 6 hours as needed for nausea or vomiting, Disp: 30 tablet, Rfl: 2    prochlorperazine (COMPAZINE) 10 MG tablet, Take 1 tablet (10 mg) by mouth every 6 hours as needed for nausea or vomiting, Disp: 90 tablet, Rfl: 1    tamsulosin (FLOMAX) 0.4 MG capsule, Take 1 capsule (0.4 mg) by mouth daily, Disp: 30 capsule, Rfl: 3    triamterene-HCTZ (MAXZIDE-25) 37.5-25 MG tablet, Take 1 tablet by mouth daily (Patient not taking: Reported on 12/19/2023), Disp: , Rfl:       Physical Exam:   /50 (BP Location: Right arm, Patient Position: Sitting, Cuff Size: Adult Large)   Pulse 57   Temp 98.3  F (36.8  C) (Oral)   Resp 18   Wt 119.6 kg (263 lb 9.6 oz)   SpO2 97%   BMI 36.76 kg/m    Wt Readings from Last 10 Encounters:   01/08/24 119.6 kg (263 lb 9.6 oz)   12/20/23 117.9 kg (260 lb)   12/19/23 118.4 kg (261 lb)   12/13/23 118 kg (260 lb 1.6 oz)   11/27/23 117.9 kg (259 lb 14.4 oz)   11/15/23 117.9 kg (260 lb)   11/08/23 120.7 kg (266 lb 1.6 oz)   10/02/23 118.4 kg (261 lb)   09/20/23 116.1 kg (256 lb)   09/05/23 117 kg (258 lb)   General: The patient is a pleasant male in no acute distress.  HEENT: EOMI. Sclerae are anicteric.   Lymph: Neck is supple with no lymphadenopathy in the cervical or supraclavicular areas.   Heart: Regular rate and rhythm.   Lungs: Clear to auscultation bilaterally.   Abdomen: Bowel sounds present, soft, nontender with no palpable hepatosplenomegaly or masses.   Extremities: No lower extremity edema noted bilaterally.   Neuro: Cranial nerves II through XII are grossly intact.  Skin: No rashes, petechiae, or bruising noted on exposed skin.  Psych: affect bright, alert and oriented          LABORATORY DATA:  Most Recent 3 CBC's:  Recent Labs   Lab Test 01/08/24  0945  "12/19/23  1000 11/27/23  1207   WBC 3.4* 3.2* 5.9   HGB 10.4* 11.2* 11.0*   MCV 91 88 89    171 243   ANEUTAUTO 2.2 2.2 4.0     Most Recent 3 BMP's:  Recent Labs   Lab Test 01/08/24  0945 12/19/23  1000 11/27/23  1207    140 141   POTASSIUM 3.9 4.0 3.6   CHLORIDE 108* 107 107   CO2 25 25 24   BUN 11.6 12.8 17.6   CR 0.91 0.88 1.02   ANIONGAP 9 8 10   BETHANY 8.6* 8.6* 8.8   GLC 96 108* 105*   PROTTOTAL 6.3* 6.5 6.7   ALBUMIN 3.9 3.9 3.8    Most Recent 3 LFT's:  Recent Labs   Lab Test 01/08/24  0945 12/19/23  1000 11/27/23  1207   AST 18 19 22   ALT 13 13 15   ALKPHOS 159* 210* 246*   BILITOTAL 0.4 0.3 0.2       I reviewed the above labs today.    PSA trend, see oncology history.      R iliac crest biopsy from 11/21/23 without evidence for malignancy.          ASSESSMENT & PLAN: Mr. Reyes is a delightful 63 year old gentleman with metastatic castration sensitive prostate adenocarcinoma as well as an incidentally diagnosed stage III clear-cell renal cell carcinoma of the right kidney (s/p radical R nephrectomy 3/19/19), who is here for a follow-up visit and initiation of docetafor now metastatic castration-resistant prostate cancer.     1. Metastatic castration-resistant prostate cancer, with involvement of the bones and RPLN:   - Patient met the criteria for CHAARTED \"high-volume\" metastatic hormone-sensitive prostate cancer. He opted for docetaxel in combination with ADT (per CHAARTED, [id number], STAMPEDE). He was subsequently treated with docetaxel x6 doses in the [facility] setting and enzalutamide.  He initiated Pluvicto in May 2023 with an initial slight decline in PSA, but this began to rise just prior to Cycle 3.  Given his XR evidence of progression in L femur and PSA rise, we re-evaluated his progression with PSMA PET scan in September 2023, with note of mixed response.  Reconsented for  [employer] on 8/28/23.  Due for 3 month collection at end of November 2023.  Canceled Pluvicto Dose for " November due to symptomatic progression with PSA rise. PSA is labile. Biopsy results from 11/21 are pending for evaluation of NEPC vs small cell given progression without a significant rise in PSA.  Will also send peripheral blood Tempus for evaluation of targetable mutations given the negative result from the 11/21/23 bone biopsy.   -Zometa next due 1/24/2024 along with Lupron.   -Palliative to manage further opiate refills. Pain is well controlled with fentanyl patch.   -Dexamethasone burst for RT and one on hand if needed for pain flares.      -Cabazitaxel started 11/27/23 given progression on Pluvicto. Tolerating treamtnet well with fatigue, mild nausea and diarrhea. Continue Zofran and Compazine as needed, patient to call clinic if these are not sufficient in controlling nausea.     -Labs reviewed today with no concerns, ok to proceed with Cycle 3. Return to clinic in 3 weeks for cycle 4 with labs and TAHMINA prior.     2. Bone metastases:   - Noted to have osseous metastatic disease at the time of diagnosis. S/p radiation to C spine and sacrum (Re-irradiation to sacrum).   - worsening radiculopathy down starting in the low back and radiating down the left leg in June. MRI read was without new/acute abnormalities  - Pain continues to fluctuate but overall well controlled/managable   - pain mgmt per palliative care, pain is now well controlled with fentanyl patch.   - Encouraged to continue calcium and vitamin D supplementation; continue Zometa 4mg IV every ~3 months. Due next in January 2024, will give with next infusion 1/29.   - RT to L femur 10/2023.  RT for bilateral posterior ribs 12/2023 with Dr. Albert.  - Added to trial list for [id number], [id number].       3. Stage 3, UISS-high risk ccRCC: s/p R nephrectomy   - Nephrectomy 3/19/19 and noted incidentally.  He is now 4.5 years post-op without evidence for recurrence.   - At this point, there is a minimal risk of recurrence of his RCC given the time since  surgery without the use of adjuvant immunotherapy. There is no suspicious adenopathy or other features on his most recent imaging studies.    - Will continue to monitor with imaging planned for prostate cancer.        37 minutes spent on the date of the encounter doing chart review, review of test results, interpretation of tests, patient visit, and documentation     LATESHA Humphreys CNP

## 2024-01-08 NOTE — NURSING NOTE
Chief Complaint   Patient presents with    Oncology Clinic Visit     Prostate CA    Port Draw     Labs drawn via port by RN in lab. VS taken.      Labs drawn via port by RN. Port accessed with 20g flat needle. Flushed with saline and heparin. Pt tolerated well. Vitals taken. Pt checked into next appt.     Zulma Street RN

## 2024-01-08 NOTE — PROGRESS NOTES
Infusion Nursing Note:  Walter Reyes presents today for Cycle 3, Day 1- Cabazitaxel Infusion.    Patient seen by provider today: Yes: Sherry Lee CNP   present during visit today: Not Applicable.    Note: Patient reports feeling well on arrival to infusion suite today. Denies signs of infection: no fever, cough, chills, rash, or chest pain. Reports CATALAN with stairs, not new. No new questions, changes, or concerns since TAHMINA appointment. Wishes to proceed with treatment today.       Intravenous Access:  Implanted Port.    Treatment Conditions:  Lab Results   Component Value Date    HGB 10.4 (L) 01/08/2024    WBC 3.4 (L) 01/08/2024    ANEU 5.0 07/07/2021    ANEUTAUTO 2.2 01/08/2024     01/08/2024        Lab Results   Component Value Date     01/08/2024    POTASSIUM 3.9 01/08/2024    MAG 1.9 08/25/2023    CR 0.91 01/08/2024    BETHANY 8.6 (L) 01/08/2024    BILITOTAL 0.4 01/08/2024    ALBUMIN 3.9 01/08/2024    ALT 13 01/08/2024    AST 18 01/08/2024       Results reviewed, labs MET treatment parameters, ok to proceed with treatment.      Post Infusion Assessment:  Patient tolerated infusion without incident.  Blood return noted pre and post infusion.  Site patent and intact, free from redness, edema or discomfort.  No evidence of extravasations.  Access discontinued per protocol.       Discharge Plan:   Prescription refills given for Prednisone.  Discharge instructions reviewed with: Patient.  Patient and/or family verbalized understanding of discharge instructions and all questions answered.  AVS to patient via [employer].  Patient will return 1/29/24 for next appointment.   Patient discharged in stable condition accompanied by: son.  Departure Mode: Ambulatory.      Jhoana Cao RN

## 2024-01-09 NOTE — PROGRESS NOTES
Labs adequate to proceed with next cycle of cabazitaxel.      See me after C4 with imaging to restage and determine next steps.

## 2024-01-12 NOTE — LETTER
2023         RE: Walter Reyes  [address] [facility] [address]        Dear Colleague,    Thank you for referring your patient, Walter Reyes, to the LT[facility], located within [facility] RADIATION ONCOLOGY. Please see a copy of my visit note below.       Department of Radiation Oncology  [facility]  [address] [place] [address]  [phone]       Radiation Oncology Follow-up Visit  2023    Walter Reyes  MRN: [id number]   : 1960     DISEASE TREATED:   Metastatic prostate cancer      RADIATION THERAPY DELIVERED:   1. C7: 3000 cGy in 10 fractions, from 2021 - 2021  2. Sacrum: 2500 cGy in 5 fractions, from 2022 - 2022    INTERVAL SINCE COMPLETION OF RADIOTHERAPY: 10 months ago (completed 2022)    SUBJECTIVE:     Mr. 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 He has osseous diseases and abdominal lymphadenopathy, and was previous treated with radiotherapy targeting C7.     While on dual androgen deprivation therapy, his PSA slowly francisco javier from 0.22 to 0.72. In this interval, his disease in the bony pelvis progressed. He had been having lower back pain radiating down his left leg, as well as dysesthesia consistent with sacral nerve impingement by his disease involving S1. Therefore, he was treated to a course of palliative radiotherapy as outlined above. He tolerated it well, and found his pain improving towards the end of his course.    He was seen in medical oncology clinic 23 where he was noted to have radiculopathy involving the lumbar spine and extending down the left lower extremity. He was referred back to radiation oncology 2/10/23 for consideration of palliative treatment to this region. Dr. Schultz noted reproducible pain at the Left SI joint. There was no lower extremity weakness, incontinence or saddle anesthesia.     MR Pelvis    Well-Baby Checkup: 2 Months  At the 2-month checkup, the healthcare provider will examine the baby and ask how things are going at home. This sheet describes some of what you can expect.     Development and milestones  The healthcare provider will ask questions about your baby. They will observe the baby to get an idea of the infant’s development. By this visit, your baby is likely doing some of the following:   Smiling on purpose, such as in response to another person (called a social smile)  Moves both arms and legs  Following you with their eyes as you move around a room  Holds head up when on tummy  Makes sounds other than crying  Feeding tips  Continue to feed your baby either breastmilk or formula. To help your baby eat well:   During the day, feed at least every 2 to 3 hours. You may need to wake the baby for daytime feedings.  At night, feed when the baby wakes, often every 3 to 4 hours. It’s OK if the baby sleeps longer than this. You likely don’t need to wake the baby for nighttime feedings.  Breastfeeding sessions should last around 10 to 15 minutes. With a bottle, give your baby 4 to 6 ounces of breastmilk or formula.  If you’re concerned about how much or how often your baby eats, discuss this with the healthcare provider.  Ask the healthcare provider if your baby should take vitamin D.  Don’t give your baby anything to eat besides breastmilk or formula. Your baby is too young for solid foods (solids) or other liquids. A young infant should not be given plain water.  Be aware that many babies of 2 months spit up after feeding. In most cases, this is normal. Call the healthcare provider right away if the baby spits up often and forcefully. Or spits up anything besides milk or formula.   Hygiene tips  Some babies poop (have bowel movements) a few times a day. Others poop as little as once every 2 to 3 days. Anything in this range is normal.  It’s fine if your baby poops even less often than every 2 to  2/18/23 showed stable lesions of S1, Right iliac, Left femoral neck, Left sacrum. Increased muscle in the paraspinal muscles, gluteal muscles, and bilateral iliacus. Sciatic nerves were unremarkable.     Today on interview, the patient states that he continues to experience shooting back and leg pain, left greater than right. He denies saddle anesthesia, difficulty walking, or weakness. Pain is localized to lateral lumbar spine, left sided. He equates this pain and sensation to what he felt 12 months ago prior to radiation in April 2022. It is 8-9/10 at baseline but controllable to 4/10 pain with the use of Tylenol and Tramadol. He states he had total relief of back and leg pain within 2 weeks after the completion of radiation to his sacrum.     PHYSICAL EXAM:  General: Healthy-appearing 62-year-old gentleman seated comfortably in a chair in no acute distress  HEENT: NC/AT. EOMI.  Pulmonary: No wheezing, stridor or respiratory distress  Extremities: No pain to palpation along the cervical, thoracic or lumbar spine. Moderate pain to palpation over the left SI joint.  Skin: Normal color and turgor  Neuro: A/O x3. 5/5 motor strength in bilateral upper/lower extremities. Light sensation intact bilaterally in extremities. Normal gait.    LABS AND IMAGING:  MR Pelvic bones (02/18/2023)    Osseous structures  Osseous structures: Redemonstration multiple T1 hypointense, sclerotic  lesions including L4, L5 vertebral body, sacrum, bilateral innominate  bones and left proximal femur are consistent with metastatic foci.  Specifically, some of these lesions demonstrate associated T2  hyperintensity particularly sacral lesions, right posterior iliac  lesion, and L5 vertebral body lesion with associated enhancement, most  compatible with active lesions.      Overall these lesions are stable in sizes using similar measurement  technique:  *  S1 lesion measures 6.6 x 3.6 x 3.8 cm, previously 6.7 x 3.8 x 3.9  cm. Again noted the  3 days if the baby is otherwise healthy. But if the baby also becomes fussy, spits up more than normal, eats less than normal, or has very hard stool, tell the healthcare provider. The baby may be constipated (unable to have a bowel movement).  Poop may range in color from mustard yellow to brown to green. If it’s another color, tell the healthcare provider.  Bathe your baby a few times per week. You may give baths more often if the baby seems to like it. But because you’re cleaning the baby during diaper changes, a daily bath often isn’t needed.  It’s OK to use mild (hypoallergenic) creams or lotions on the baby’s skin. Don't put lotion on the baby’s hands.    Sleeping tips  At 2 months, most babies sleep around 15 to 18 hours each day. It’s common to sleep for short spurts throughout the day, rather than for hours at a time. The baby may be fussy before going to bed for the night, around 6 p.m. to 9 p.m. This is normal. To help your baby sleep safely and soundly follow the tips below:   Put your baby on their back for naps and sleeping until your child is 1 year old. This can lower the risk for SIDS, aspiration, and choking. Never put your baby on their side or stomach for sleep or naps. When your baby is awake, let your child spend time on their tummy as long as you are watching your child. This helps your child build strong tummy and neck muscles. This will also help keep your baby's head from flattening. This problem can happen when babies spend so much time on their back.  Ask the healthcare provider if you should let your baby sleep with a pacifier. Sleeping with a pacifier has been shown to decrease the risk for SIDS. But don't offer it until after breastfeeding has been established. If your baby doesn’t want the pacifier, don’t try to force them to take it.  Don’t put a crib bumper, pillow, loose blankets, or stuffed animals in the crib. These could suffocate the baby.  Swaddling means wrapping your   baby snugly in a blanket, but with enough space so they can move hips and legs. Swaddling can help the baby feel safe and fall asleep. You can buy a special swaddling blanket designed to make swaddling easier. But don’t use swaddling if your baby is 2 months or older, or if your baby can roll over on their own. Swaddling may raise the risk for SIDS (sudden infant death syndrome) if the swaddled baby rolls onto their stomach. Your baby's legs should be able to move up and out at the hips. Don’t place your baby’s legs so that they are held together and straight down. This raises the risk that the hip joints won’t grow and develop correctly. This can cause a problem called hip dysplasia and dislocation. Also be careful of swaddling your baby if the weather is warm or hot. Using a thick blanket in warm weather can make your baby overheat. Instead use a lighter blanket or sheet to swaddle the baby.   Don't put your baby on a couch or armchair for sleep. Sleeping on a couch or armchair puts the baby at a much higher risk for death, including SIDS.  Don't use infant seats, car seats, strollers, infant carriers, or infant swings for routine sleep and daily naps. These may cause a baby's airway to become blocked or the baby to suffocate.  It’s OK to put the baby to bed awake. It’s also OK to let the baby cry in bed for a short time, but no longer than a few minutes. At this age babies aren’t ready to cry themselves to sleep.  If you have trouble getting your baby to sleep, ask the healthcare provider for tips.  Don't share a bed (co-sleep) with your baby. Bed-sharing has been shown to increase the risk for SIDS. The American Academy of Pediatrics says that babies should sleep in the same room as their parents. They should be close to their parents' bed, but in a separate bed or crib. This sleeping setup should be done for the baby's first year, if possible. But you should do it for at least the first 6 months.  Always put  lesion involving ventral part of sacral foramen  with possible abutment to the S1 nerve (image 14 series 7)  *  Right iliac lesion measures 2.4 x 2.6 x 2.3 cm, previously 2.3 x  2.6 x 2.6 cm.  *  Left femoral neck lesion measures 2.5 cm, previously 2.5 cm.  *  Left sacral lesion 3.3 cm, previously 3.3 cm.      Internal derangement of joints are not well assessed owing to chosen  field of view.     Joint and Periarticular soft tissue:     Sacroiliac joints and pubic symphysis are congruent.     Joint effusion: A physiologic amount of joint fluid in bilateral hip.     Bursal effusion: Small right greater trochanteric bursal fluid,  increased since prior. No substantial iliopsoas or left trochanteric  bursal effusion.     Muscles and tendons  Muscles and tendons: Redemonstration multiple muscle edema including  paraspinal muscles right greater than left gluteal muscles especially  gluteus medius, bilateral iliac is muscles with associated  enhancement. Overall these findings appears to be increased since  prior.     Proximal hamstrings, rectus femoris, sartorius, and iliopsoas tendons  intact bilaterally. The hip abductors are intact bilaterally. The  visualized adductor muscles are unremarkable bilaterally.      Nerves:  The visualized course of the sciatic nerves are unremarkable  bilaterally.  Impression:  1. Overall stable multiple osseous metastasis including enhancing  lesion of left sacrum adjacent to SI joint.  2. Increased multiple muscle edema and enhancement, of uncertain  etiology.  3. Grossly similar appearance of prostate. Of note this is not  tailored for prostate assessment.    IMPRESSION:   Metastatic prostate adenocarcinoma s/p two prior courses of palliative radiotherapy to the cervical spine and sacrum.     He presents today to review MR pelvis conducted 2/18/23 with grossly stable disease in the setting of worsening back pain/radiculopathy, likely insidious disease progression.     PLAN: We  cribs, bassinets, and play yards in areas with no hazards. This means no dangling cords, wires, or window coverings. This will lower the risk for strangulation.  Don't use baby heart rate and monitors or special devices to help lower the risk for SIDS. These devices include wedges, positioners, and special mattresses. These devices have not been shown to prevent SIDS. In rare cases, they have caused the death of a baby.  Talk with your baby's healthcare provider about these and other health and safety issues.  Safety tips  To prevent burns, don’t carry or drink hot liquids, such as coffee or tea, near the baby. Turn the water heater down to a temperature of 120.0°F (49.0°C) or below.  Don’t smoke or allow others to smoke near the baby. If you or other family members smoke, do so outdoors while wearing a jacket, and then remove the jacket before holding the baby. Never smoke around the baby.  It’s fine to bring your baby out of the house. But stay away from confined, crowded places where germs can spread.  When you take the baby outside, don't stay too long in direct sunlight. Keep the baby covered or seek out the shade.  In the car, always put the baby in a rear-facing car seat. This should be secured in the back seat according to the car seat’s directions. Never leave the baby alone in the car.  Don’t leave the baby on a high surface, such as a table, bed, or couch. They could fall and get hurt. Also, don’t place the baby in a bouncy seat on a high surface.  Older siblings can hold and play with the baby as long as an adult supervises.   Call the healthcare provider right away if the baby is under 3 months of age and has a rectal temperature of 100.4° F (38° C) or higher.    Vaccines  Based on recommendations from the CDC, at this visit your baby may get the following vaccines:   Diphtheria, tetanus, and pertussis  Haemophilus influenzae type b  Hepatitis B  Pneumococcus  Polio  Rotavirus  Vaccines help keep your  recommend a consideration of repeat radiotherapy to a reduced dose in hopes of palliating his symptoms.  However, given his previous radiotherapy history, the second course of radiotherapy may be limited in efficacy but may have potential for significant increase in toxicity.    Patient wishes to proceed with a single additional radiotherapy in hopes of duplicating the palliative affect of the previous radiotherapy.  We discussed the potential for benefit as well as potential for significant toxicity because of the repeat treatment of the identical area.       Osmin Richard MD  PGY-2 Radiation Oncology  Department of Radiation Oncology  [facility]  Phone: [phone]      I saw the patient with the resident.  I agree with the resident's note and plan of care.  30Min total    DELORIS. Brody Lizarraga M.D.  Department of Radiation Oncology  [facility]       baby healthy  Vaccines (also called immunizations) help a baby’s body build up defenses against serious diseases. Having your baby fully vaccinated will also help lower your baby's risk for SIDS. Many are given in a series of doses. To be protected, your baby needs each dose at the right time. Many combination vaccines are available. These can help reduce the number of needlesticks needed to vaccinate your baby against all of these important diseases. Talk with your child's healthcare provider about the benefits of vaccines and any risks they may have. Also ask what to do if your baby misses a dose. If this happens, your baby will need catch-up vaccines to be fully protected. After vaccines are given, some babies have mild side effects, such as redness and swelling where the shot was given, fever, fussiness, or sleepiness. Talk with the provider about how to manage these symptoms.   Rohan last reviewed this educational content on 2/1/2023 © 2000-2023 [employer]. All rights reserved. This information is not intended as a substitute for professional medical care. Always follow your healthcare professional's instructions.

## 2024-01-24 ENCOUNTER — VIRTUAL VISIT (OUTPATIENT)
Dept: PALLIATIVE MEDICINE | Facility: CLINIC | Age: 64
End: 2024-01-24
Payer: COMMERCIAL

## 2024-01-24 VITALS — HEIGHT: 71 IN | WEIGHT: 260 LBS | BODY MASS INDEX: 36.4 KG/M2

## 2024-01-24 DIAGNOSIS — G89.3 CANCER ASSOCIATED PAIN: ICD-10-CM

## 2024-01-24 DIAGNOSIS — C61 PROSTATE CANCER (H): ICD-10-CM

## 2024-01-24 PROCEDURE — 99214 OFFICE O/P EST MOD 30 MIN: CPT | Mod: 95 | Performed by: NURSE PRACTITIONER

## 2024-01-24 RX ORDER — FENTANYL 25 UG/1
1 PATCH TRANSDERMAL
Qty: 10 PATCH | Refills: 0 | Status: SHIPPED | OUTPATIENT
Start: 2024-01-30 | End: 2024-03-06

## 2024-01-24 ASSESSMENT — PAIN SCALES - GENERAL: PAINLEVEL: NO PAIN (0)

## 2024-01-24 NOTE — PROGRESS NOTES
Virtual Visit Details    Type of service:  Video Visit     Originating Location (pt. Location): Home    Distant Location (provider location):  Off-site  Platform used for Video Visit: [facility]    Palliative Care Outpatient Clinic      Patient ID/Chief Complaint: Walter Reyes 63 year old male who is presenting to the palliative medicine clinic today at the request of Yamilet Espinoza PA-C for a palliative care follow-up secondary to metastatic prostate cancer.   The patient's primary care provider is:  Poncho Ortiz       Impression & Recommendations:  63-year-old male with metastatic prostate cancer with spinal and bone metastases.  Palliative care assistance requested for symptom management of chronic cancer associated back pain with radiculopathy in the legs.    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  High risk for recurrence.    Past medical history also includes hyperlipidemia, hypothyroidism, HTN, GERD, Acosta's neuroma, plantar fasciitis, peroneal tendinitis, and mild chemotherapy-induced polyneuropathy.    Radiculopathy in both legs, has improved on gabapentin.  He has constant pain over the sacrum and to the left of the sacrum.  Pain is constant, deep, aching.  Interferes with sleep. Pain worsens when reclining or lying down. Due to progressive cancer, he is also having more rib and back pain. He is not seeking RTX at this time, rather opting for medication management.     Symptoms/recommendations/discussion:  Pain is well controlled on fentanyl patch 25 mcg/h.   Continue gabapentin 300 mg every morning and afternoon, 600 mg at bedtime. He's has some worsening in neuropathic pain in legs, dermatome distribution suggests related to area and spine previously irradiated and no further radiation possible.  Dysesthesia primarily described as numbness and tingling down left side of his left leg into foot that last for a few  "minutes at max.  He feels as though it is getting better recently.  He knows we could add Cymbalta for general pain, neuropathic pain, and mood but wants to hold off at this time.  Continue oxycodone IR 5-10 mg every 4 hours as needed, generally takes 1 tab at bedtime but minimal use otherwise  Counseled to monitor for constipation  Counseled opioid safety, Narcan nasal spray ordered  Social situation includes residing in home with wife Micheline. Stable housing and food. No h/o substance abuse.   Palliative follow-up within the next 6-8 weeks. He has direct phone # for our team.         How to get a hold of us:  For non-urgent matters, MyChart works best.    For more urgent matters, or if you prefer not to use MyChart, call our clinic nurse coordinator Sherry Palma RN at [phone] or [phone]    We have an on-call number for evenings and weekends. Please call this only if you are having uncontrolled symptoms or serious side effects from your medicines: [phone].     For refills, please give us a week (5 working days) notice. We don't always have providers available everyday to do refills. If you call the day you run out of your medicine, we may not be able to refill it in time, so call 5 days in advance        History:  History gathered today from: patient, family/loved ones, medical chart, outside records including Care Everywhere      PE: Ht 1.803 m (5' 11\")   Wt 117.9 kg (260 lb)   BMI 36.26 kg/m     Wt Readings from Last 3 Encounters:   01/24/24 117.9 kg (260 lb)   01/08/24 119.6 kg (263 lb 9.6 oz)   12/20/23 117.9 kg (260 lb)       Gen alert, comfortable appearing, NAD.   Head NCAT.  Eyes anicteric without injection  Face symmetric, eyes conjugate  Lungs unlabored, no cough, speaking full sentences  Skin no rashes or lesions evident on face/neck  Neuro Face symmetric, eyes conjugate; speech fluent.  Neuropsych exam normal including affect, sensorium, gross memory, thought processes, and fund of " knowledge.         Data reviewed:  I reviewed electrolytes, BUN/creatinine, liver profile, hemoglobin and hematocrit, platelet count, and most recent imaging  Recent oncology notes     database reviewed: 1/24        35 minutes spent on the date of the encounter doing chart review, history and exam, patient education & counseling, documentation and other activities as noted above.        Thank you for involving us in the patient's care.   LATESHA Seaman, [facility] Palliative Care Service

## 2024-01-24 NOTE — NURSING NOTE
Is the patient currently in the state of [place]? YES    Visit mode:VIDEO    If the visit is dropped, the patient can be reconnected by: VIDEO VISIT: Text to cell phone:   Telephone Information:   Mobile [phone]       Will anyone else be joining the visit? NO  (If patient encounters technical issues they should call [phone]    How would you like to obtain your AVS? MyChart    Are changes needed to the allergy or medication list? Pt stated no changes to allergies and Pt stated no med changes    Reason for visit: MERCED Brizuela LPN

## 2024-01-29 ENCOUNTER — INFUSION THERAPY VISIT (OUTPATIENT)
Dept: ONCOLOGY | Facility: CLINIC | Age: 64
End: 2024-01-29
Attending: STUDENT IN AN ORGANIZED HEALTH CARE EDUCATION/TRAINING PROGRAM
Payer: COMMERCIAL

## 2024-01-29 ENCOUNTER — ONCOLOGY VISIT (OUTPATIENT)
Dept: ONCOLOGY | Facility: CLINIC | Age: 64
End: 2024-01-29
Payer: COMMERCIAL

## 2024-01-29 ENCOUNTER — ANCILLARY PROCEDURE (OUTPATIENT)
Dept: CT IMAGING | Facility: CLINIC | Age: 64
End: 2024-01-29
Payer: COMMERCIAL

## 2024-01-29 ENCOUNTER — APPOINTMENT (OUTPATIENT)
Dept: LAB | Facility: CLINIC | Age: 64
End: 2024-01-29
Attending: STUDENT IN AN ORGANIZED HEALTH CARE EDUCATION/TRAINING PROGRAM
Payer: COMMERCIAL

## 2024-01-29 VITALS
RESPIRATION RATE: 16 BRPM | HEIGHT: 71 IN | BODY MASS INDEX: 37.85 KG/M2 | TEMPERATURE: 98.2 F | HEART RATE: 70 BPM | SYSTOLIC BLOOD PRESSURE: 133 MMHG | WEIGHT: 270.4 LBS | OXYGEN SATURATION: 96 % | DIASTOLIC BLOOD PRESSURE: 84 MMHG

## 2024-01-29 DIAGNOSIS — C61 MALIGNANT NEOPLASM OF PROSTATE METASTATIC TO BONE (H): Primary | ICD-10-CM

## 2024-01-29 DIAGNOSIS — C79.51 MALIGNANT NEOPLASM OF PROSTATE METASTATIC TO BONE (H): ICD-10-CM

## 2024-01-29 DIAGNOSIS — R07.89 PRESSURE IN CHEST: ICD-10-CM

## 2024-01-29 DIAGNOSIS — R05.9 COUGH, UNSPECIFIED TYPE: ICD-10-CM

## 2024-01-29 DIAGNOSIS — C79.51 MALIGNANT NEOPLASM OF PROSTATE METASTATIC TO BONE (H): Primary | ICD-10-CM

## 2024-01-29 DIAGNOSIS — C61 MALIGNANT NEOPLASM OF PROSTATE METASTATIC TO BONE (H): ICD-10-CM

## 2024-01-29 LAB
ALBUMIN SERPL BCG-MCNC: 3.8 G/DL (ref 3.5–5.2)
ALP SERPL-CCNC: 122 U/L (ref 40–150)
ALT SERPL W P-5'-P-CCNC: 16 U/L (ref 0–70)
ANION GAP SERPL CALCULATED.3IONS-SCNC: 10 MMOL/L (ref 7–15)
AST SERPL W P-5'-P-CCNC: 18 U/L (ref 0–45)
BASOPHILS # BLD AUTO: 0 10E3/UL (ref 0–0.2)
BASOPHILS NFR BLD AUTO: 1 %
BILIRUB SERPL-MCNC: 0.3 MG/DL
BUN SERPL-MCNC: 13.3 MG/DL (ref 8–23)
CALCIUM SERPL-MCNC: 8.9 MG/DL (ref 8.8–10.2)
CHLORIDE SERPL-SCNC: 104 MMOL/L (ref 98–107)
CREAT SERPL-MCNC: 0.97 MG/DL (ref 0.67–1.17)
DEPRECATED HCO3 PLAS-SCNC: 26 MMOL/L (ref 22–29)
EGFRCR SERPLBLD CKD-EPI 2021: 89 ML/MIN/1.73M2
EOSINOPHIL # BLD AUTO: 0 10E3/UL (ref 0–0.7)
EOSINOPHIL NFR BLD AUTO: 1 %
ERYTHROCYTE [DISTWIDTH] IN BLOOD BY AUTOMATED COUNT: 17.2 % (ref 10–15)
GLUCOSE SERPL-MCNC: 100 MG/DL (ref 70–99)
HCT VFR BLD AUTO: 31.3 % (ref 40–53)
HGB BLD-MCNC: 10.4 G/DL (ref 13.3–17.7)
IMM GRANULOCYTES # BLD: 0.1 10E3/UL
IMM GRANULOCYTES NFR BLD: 2 %
LYMPHOCYTES # BLD AUTO: 0.5 10E3/UL (ref 0.8–5.3)
LYMPHOCYTES NFR BLD AUTO: 10 %
MCH RBC QN AUTO: 30.2 PG (ref 26.5–33)
MCHC RBC AUTO-ENTMCNC: 33.2 G/DL (ref 31.5–36.5)
MCV RBC AUTO: 91 FL (ref 78–100)
MONOCYTES # BLD AUTO: 0.7 10E3/UL (ref 0–1.3)
MONOCYTES NFR BLD AUTO: 14 %
NEUTROPHILS # BLD AUTO: 3.6 10E3/UL (ref 1.6–8.3)
NEUTROPHILS NFR BLD AUTO: 72 %
NRBC # BLD AUTO: 0 10E3/UL
NRBC BLD AUTO-RTO: 0 /100
PLATELET # BLD AUTO: 216 10E3/UL (ref 150–450)
POTASSIUM SERPL-SCNC: 4 MMOL/L (ref 3.4–5.3)
PROT SERPL-MCNC: 6.2 G/DL (ref 6.4–8.3)
PSA SERPL DL<=0.01 NG/ML-MCNC: 3.07 NG/ML (ref 0–4.5)
RBC # BLD AUTO: 3.44 10E6/UL (ref 4.4–5.9)
SODIUM SERPL-SCNC: 140 MMOL/L (ref 135–145)
WBC # BLD AUTO: 4.9 10E3/UL (ref 4–11)

## 2024-01-29 PROCEDURE — 250N000011 HC RX IP 250 OP 636: Mod: JZ

## 2024-01-29 PROCEDURE — 96402 CHEMO HORMON ANTINEOPL SQ/IM: CPT

## 2024-01-29 PROCEDURE — 250N000011 HC RX IP 250 OP 636

## 2024-01-29 PROCEDURE — 71260 CT THORAX DX C+: CPT | Mod: GC | Performed by: RADIOLOGY

## 2024-01-29 PROCEDURE — 250N000011 HC RX IP 250 OP 636: Performed by: STUDENT IN AN ORGANIZED HEALTH CARE EDUCATION/TRAINING PROGRAM

## 2024-01-29 PROCEDURE — 80053 COMPREHEN METABOLIC PANEL: CPT

## 2024-01-29 PROCEDURE — 36591 DRAW BLOOD OFF VENOUS DEVICE: CPT

## 2024-01-29 PROCEDURE — 258N000003 HC RX IP 258 OP 636: Performed by: STUDENT IN AN ORGANIZED HEALTH CARE EDUCATION/TRAINING PROGRAM

## 2024-01-29 PROCEDURE — 96375 TX/PRO/DX INJ NEW DRUG ADDON: CPT

## 2024-01-29 PROCEDURE — 85041 AUTOMATED RBC COUNT: CPT

## 2024-01-29 PROCEDURE — 99213 OFFICE O/P EST LOW 20 MIN: CPT | Mod: 25

## 2024-01-29 PROCEDURE — 96413 CHEMO IV INFUSION 1 HR: CPT

## 2024-01-29 PROCEDURE — 99215 OFFICE O/P EST HI 40 MIN: CPT

## 2024-01-29 PROCEDURE — 96367 TX/PROPH/DG ADDL SEQ IV INF: CPT

## 2024-01-29 PROCEDURE — 84153 ASSAY OF PSA TOTAL: CPT

## 2024-01-29 RX ORDER — IOPAMIDOL 755 MG/ML
125 INJECTION, SOLUTION INTRAVASCULAR ONCE
Status: COMPLETED | OUTPATIENT
Start: 2024-01-29 | End: 2024-01-29

## 2024-01-29 RX ORDER — HEPARIN SODIUM (PORCINE) LOCK FLUSH IV SOLN 100 UNIT/ML 100 UNIT/ML
500 SOLUTION INTRAVENOUS ONCE
Status: COMPLETED | OUTPATIENT
Start: 2024-01-29 | End: 2024-01-29

## 2024-01-29 RX ORDER — ZOLEDRONIC ACID 0.04 MG/ML
4 INJECTION, SOLUTION INTRAVENOUS ONCE
Status: CANCELLED | OUTPATIENT
Start: 2024-02-03 | End: 2024-02-03

## 2024-01-29 RX ORDER — HEPARIN SODIUM (PORCINE) LOCK FLUSH IV SOLN 100 UNIT/ML 100 UNIT/ML
5 SOLUTION INTRAVENOUS
Status: DISCONTINUED | OUTPATIENT
Start: 2024-01-29 | End: 2024-01-29 | Stop reason: HOSPADM

## 2024-01-29 RX ORDER — PREDNISONE 10 MG/1
10 TABLET ORAL DAILY
Qty: 21 TABLET | Refills: 0 | Status: SHIPPED | OUTPATIENT
Start: 2024-01-29 | End: 2024-02-13

## 2024-01-29 RX ORDER — ONDANSETRON 2 MG/ML
8 INJECTION INTRAMUSCULAR; INTRAVENOUS ONCE
Status: COMPLETED | OUTPATIENT
Start: 2024-01-29 | End: 2024-01-29

## 2024-01-29 RX ORDER — DIPHENHYDRAMINE HYDROCHLORIDE 50 MG/ML
50 INJECTION INTRAMUSCULAR; INTRAVENOUS
Status: CANCELLED
Start: 2024-01-29

## 2024-01-29 RX ORDER — HEPARIN SODIUM,PORCINE 10 UNIT/ML
5-20 VIAL (ML) INTRAVENOUS DAILY PRN
Status: CANCELLED | OUTPATIENT
Start: 2024-01-29

## 2024-01-29 RX ORDER — HEPARIN SODIUM,PORCINE 10 UNIT/ML
5 VIAL (ML) INTRAVENOUS
Status: CANCELLED | OUTPATIENT
Start: 2024-02-03

## 2024-01-29 RX ORDER — HEPARIN SODIUM (PORCINE) LOCK FLUSH IV SOLN 100 UNIT/ML 100 UNIT/ML
5 SOLUTION INTRAVENOUS
Status: CANCELLED | OUTPATIENT
Start: 2024-02-03

## 2024-01-29 RX ORDER — ALBUTEROL SULFATE 90 UG/1
1-2 AEROSOL, METERED RESPIRATORY (INHALATION)
Status: CANCELLED
Start: 2024-01-29

## 2024-01-29 RX ORDER — METHYLPREDNISOLONE SODIUM SUCCINATE 125 MG/2ML
125 INJECTION, POWDER, LYOPHILIZED, FOR SOLUTION INTRAMUSCULAR; INTRAVENOUS
Status: CANCELLED
Start: 2024-01-29

## 2024-01-29 RX ORDER — ZOLEDRONIC ACID 0.04 MG/ML
4 INJECTION, SOLUTION INTRAVENOUS ONCE
Status: CANCELLED | OUTPATIENT
Start: 2024-05-22 | End: 2024-02-03

## 2024-01-29 RX ORDER — EPINEPHRINE 1 MG/ML
0.3 INJECTION, SOLUTION INTRAMUSCULAR; SUBCUTANEOUS EVERY 5 MIN PRN
Status: CANCELLED | OUTPATIENT
Start: 2024-01-29

## 2024-01-29 RX ORDER — LORAZEPAM 2 MG/ML
0.5 INJECTION INTRAMUSCULAR EVERY 4 HOURS PRN
Status: CANCELLED | OUTPATIENT
Start: 2024-01-29

## 2024-01-29 RX ORDER — MEPERIDINE HYDROCHLORIDE 25 MG/ML
25 INJECTION INTRAMUSCULAR; INTRAVENOUS; SUBCUTANEOUS EVERY 30 MIN PRN
Status: CANCELLED | OUTPATIENT
Start: 2024-01-29

## 2024-01-29 RX ORDER — HEPARIN SODIUM (PORCINE) LOCK FLUSH IV SOLN 100 UNIT/ML 100 UNIT/ML
5 SOLUTION INTRAVENOUS
Status: CANCELLED | OUTPATIENT
Start: 2024-01-29

## 2024-01-29 RX ORDER — ALBUTEROL SULFATE 0.83 MG/ML
2.5 SOLUTION RESPIRATORY (INHALATION)
Status: CANCELLED | OUTPATIENT
Start: 2024-01-29

## 2024-01-29 RX ORDER — ZOLEDRONIC ACID 0.04 MG/ML
4 INJECTION, SOLUTION INTRAVENOUS ONCE
Status: COMPLETED | OUTPATIENT
Start: 2024-01-29 | End: 2024-01-29

## 2024-01-29 RX ORDER — ONDANSETRON 2 MG/ML
8 INJECTION INTRAMUSCULAR; INTRAVENOUS ONCE
Status: CANCELLED | OUTPATIENT
Start: 2024-01-29

## 2024-01-29 RX ADMIN — Medication 500 UNITS: at 11:53

## 2024-01-29 RX ADMIN — SODIUM CHLORIDE 50 MG: 0.9 INJECTION, SOLUTION INTRAVENOUS at 13:54

## 2024-01-29 RX ADMIN — SODIUM CHLORIDE 250 ML: 9 INJECTION, SOLUTION INTRAVENOUS at 13:16

## 2024-01-29 RX ADMIN — FAMOTIDINE 20 MG: 10 INJECTION INTRAVENOUS at 13:16

## 2024-01-29 RX ADMIN — IOPAMIDOL 125 ML: 755 INJECTION, SOLUTION INTRAVASCULAR at 15:47

## 2024-01-29 RX ADMIN — ONDANSETRON 8 MG: 2 INJECTION INTRAMUSCULAR; INTRAVENOUS at 13:16

## 2024-01-29 RX ADMIN — HEPARIN SODIUM (PORCINE) LOCK FLUSH IV SOLN 100 UNIT/ML 500 UNITS: 100 SOLUTION at 15:54

## 2024-01-29 RX ADMIN — ZOLEDRONIC ACID 4 MG: 0.04 INJECTION, SOLUTION INTRAVENOUS at 15:02

## 2024-01-29 RX ADMIN — DIPHENHYDRAMINE HYDROCHLORIDE 25 MG: 50 INJECTION, SOLUTION INTRAMUSCULAR; INTRAVENOUS at 13:21

## 2024-01-29 RX ADMIN — LEUPROLIDE ACETATE 22.5 MG: KIT at 13:36

## 2024-01-29 RX ADMIN — Medication 5 ML: at 15:21

## 2024-01-29 ASSESSMENT — PAIN SCALES - GENERAL: PAINLEVEL: NO PAIN (0)

## 2024-01-29 NOTE — PROGRESS NOTES
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        INTERVAL HISTORY:     Fatigue has been worse with this chemo, more compounding with each cycle. Taking him longer to \"bounce back.\" He feels better in the mornings but then gets very tired by afternoon/evening throughout the three week cycle. He continues to work the second half of each cycle.     Pain is well controlled with fentanyl patch and oxycodone. Tried to ride his exercise bike a few days ago and his left femur started hurting quickly upon starting to ride the bike. Since that time, his femur aches in the evening. He reports the pain as mild, rating it 2-3/10.     Nausea came on sooner this cycle, but was well controlled with alternating antiemetics for 3-4 days. Has been maintaining adequate oral intake. Bowels fluctuate between diarrhea and constipation, which is not knew. Denies any abdominal pain or cramping. No urinary concerns.     Denies shortness of breath, cough or chest pain. Reports he has had a sensation in his chest along his sternum that is similar to when you need to cough. He can reproduce the sensation/pressure in his chest when he takes deep breaths. Denies fever, sore throat or concerns for infection. Denies feeling dizzy or lightheaded.     Review of Systems:  Patient denies any of the following except if noted above: fevers, chills, difficulty with energy, vision or hearing changes, chest pain, dyspnea, abdominal pain, nausea, vomiting, diarrhea, constipation, urinary concerns, headaches, numbness, tingling, issues with sleep or mood. Also denies lumps, bumps, rashes or skin lesions, bleeding or bruising issues.          ONCOLOGY HISTORY: Mr. Walter Reyes is a 63 year old gentleman with a metastatic castration-sensitive prostate cancer and incidentally diagnosed stage 3 clear cell RCC (s/p radical R nephrectomy on 3/19/19) which is now " "4.5+ years post-therapy without evidence of recurrence. His oncologic history is detailed below.     1. 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 Naylor at [facility]. Prior PSA were 1.4 on 8/16/17, 2.4 on 6/28/16, 2.9 on 6/28/16, and as low as 0.4 on 3/26/2003.   - 1/08/2019: Consultation with Sarah Wilson CNP in Urology clinic. Repeat PSA 9.6.  - 1/16/2019: MRI prostate with contrast - \"This examination is characterized as PIRADS 5- very high probability. Clinically significant cancer is highly likely to be present. There is a large, invasive mass arising from the right peripheral zone and extending into the neurovascular bundle, seminal vesicle, and along the anterolateral right mesorectal fascia. Metastatic right external iliac lymph node. Metastatic lesion in the posterior/superior right acetabulum.\"  - 1/25/2019: CT abdomen and pelvis with IV contrast - \"1. Heterogeneous enhancing mass posteriorly in the upper pole of the right kidney measures 4.5 x 5.8 x 5.7 cm (AP by transverse by craniocaudal). It has a small nodular component extending posterior medially which abuts the right psoas muscle. This nodular extension measures 2.1 x 2.1 cm. Minimal stranding about the mass. No definite thrombus within the right renal vein. This renal mass is compatible with a renal cell carcinoma. A paraaortic lymph node situated immediately posterior to the left renal vein measures 1.1 cm in short axis, suspicious for metastasis. 2. A 2 cm right external iliac lymph node is also suspicious for metastases. 3. Multiple sclerotic osseous lesions suspicious for metastases. These include 0.8 and 0.6 cm sclerotic lesions laterally in the right iliac wing (images 53 and 62 respectively). Ill-defined groundglass density laterally in the right acetabulum " "measuring 1.7 x 1.2 cm corresponds with the lesion identified on MRI. A 0.4 cm groundglass density in the left acetabulum. Sclerotic lesion in the left femoral neck measures 0.9 x 1.6 cm (image 81). Sclerotic metastases would be more compatible with prostate metastases. 4. A 3 subcentimeter hepatic lesions are indeterminate. Metastases would be a consideration. These can be further characterized with liver MRI.\"  - 1/25/2019: NM bone scan - \"There is focal bony uptake in the left femoral neck, right acetabulum, right of midline at the S1 or L5 level of the spine, within multiple bilateral ribs, in the C7 or T1 level of the spine, and anteriorly within the skull. Findings are suspicious for metastases.\"  - 1/31/19: CT chest with contrast - \" No suspicious nodules in the chest.  Stable appearance of a 5.8 cm right renal mass concerning for renal carcinoma until proven otherwise.  Stable indeterminant subcentimeter hypodensities in the liver.  Multifocal osteoblastic metastasis including 2.5 cm lesion in the right fifth rib posteriorly and a 1.6 cm lesion in the right third rib posteriorly. No lytic lesions identified.\"  - 2/6/19: CT-guided right sclerotic fifth rib lesion biopsy - \"Metastatic carcinoma, consistent with prostate primary.  Immunohistochemical stains performed show the metastatic carcinoma stains positive for NKX3.1 (prostate marker), negative for STACIE 3 and PAX8, which supports the above diagnosis.\"  - 2/15/19: Started Casodex 50mg every day and consented for the biobanking protocol. 3/18/19 - stopped Casodex.  - 2/19/19: Case discussed in tumor board - recommendation for nephectomy for suspected malignant right renal mass.   - 2/26/19: Started Lupron 22.5mg every 3 months.   - 5/8/19: Started Docetaxel 75mg/m2 IV every 3 weeks. 06/18/19 - cycle 3, 7/10/19 - cycle 4, 07/31/19 - cycle 5, 08/21/19 - cycle 6.   - 10/2/19: Restaging CT CAP and NM Bone Scan showed improved osseous disease, no evidence of " "recurrent or new metastatic disease.  - 1/5/20: Restaging CT CAP and NM Bone Scan showed stable osseous disease, no evidence of recurrent or new metastatic disease.  - 4/6/20: NM bone scan with slightly improved uptake in known skeletal mets. CT C/A/P with contrast with stable osseous mets, no yusuf enlargement, no new visceral mets etc.  - 04/08/20: Zometa every 3 months.  - 10/5/20: CT C/A/P with contrast and NM bone scan - SD.   - 1/4/21: CT C/A/P with contrast and NM bone scan - SD but slight increase in right 5th rib tracer uptake and in posterior L5 sclerosis. 1/6/21  PSA = 0.29  - 03/29/21: CT C/A/P with contrast - single new right sacral 8mm bone lesion (suspicious), other bone mets stable. No visceral/yusuf disease. Nephrectomy site without recurrence. NM bone scan - SD but \"increased uptake associated with the prior lesions of the lower  cervical spine, posterior right fourth rib and right L5 posterior arch elements. Otherwise unchanged uptake associated with the proximal left femur and left anterior fourth rib. Similar uptake of the left halux, possibly secondary to degenerative osteoarthritis or gout.\"    3/31/21 PSA = 0.44  7/7/21  PSA = 0.47  11/2021 PSA = 1.05  -- Start XTANDI  12/16/21 PSA =0.22  2/11/22 PSA= 0.50  3/22/22 PSA=0.72  5/5/2022 PSA=1.79  7/11/2022 PSA=2.16 --Start cycle 1 docetaxel   8/22/22 PSA = 1.36  9/12/22 PSA = 1.09  10/24/22 Cycle #6 of Docetaxel. End of treatment  1/9/23  PSA =0.66  3/20/23  PSA=1.54  4/21/23 PSA = 1.81  T=15  5/22/23 C1 Pluvicto   06/07/23          PSA = 1.42  7/18/23 PSA=1.75, C2 Pluvicto   8/28/23 Transfer of care initial visit for Zoryovani.  PSA 2.06.  C3 Pluvicto scheduled for 8/30.  Proceed with dose as planned.  MR L femur, ortho referral, PSMA PET ordered for prior to follow-up.    10/2/23 Palliative RT to L femur metastasis complete.  PSMA PET with mixed response.  PSA 2.21.  RT to L femur pending.  Continue with Dose #4 of Pluvicto despite mixed response " "to therapy.  Dex course for possible pain flare with RT.    11/8/23  Follow-up prior to Dose #5 Pluvicto.  More pain in chest/ribs/back. PSA 4.38.  Testosterone=3.  Rad onc referral for ribs.  Biopsy progressive lesion for progression on Pluvicto.  Cabazitaxel scheduled for follow-up appointment.  Pending Tempus testing on biopsy sample versus peripheral blood.   11/27/23 Bx completed from R iliac crest but unrevealing for either PCa or RCC.  Start cabazitaxel C1D1.  PSA 2.81.    12/19/23 PSA = 3.66  1/8/24 PSA = 3.55  1/29/24 PSA = 3.07     Radiation history:   -C7         3,000 cGy       06 X      8/05/2021        8/18/2021        13       10  -2 Sacrum          2,500 cGy       18 X      4/12/2022        4/18/2022         6           -Sacrum retreat               700 cGy        18 X      2/28/2023        2/28/2023  -Left femur to 2000 cGy in 5 fractions, completed 10/10/2023   -bilateral posterior chest wall at an area of osseous involvement of the ribs and adjacent T4 and 5 spine, to be delivered to 2000 cGy in 5 fractions.  completed 12/13-12/19/23.      2. Stage III ([id number]), grade 3 of 4, clear-cell RCC of right kidney:  - Incidentally diagnosed as above.   - Underwent curative-intent robotic right radical nephrectomy with Dr. Wesley Cleveland on 3/19/19. No tumor spillage per op report.   - Path showed: \"Histologic Type: Clear cell renal cell carcinoma; Sarcomatoid Features: Not identified; Histologic Grade: Nucleolar grade 3 (WHO/ISUP). Extent Tumor Size: 4.3 cm. Microscopic Tumor Extension: Tumor extension into renal sinus (in vascular structures). Margins: Negative. Tumor Necrosis: Present; focal. Lymph-Vascular Invasion: Not identified.  Pathologic Staging (pTNM) Primary Tumor (pT): pT3a: Tumor extends into renal vein branches/renal sinus. Regional Lymph Nodes (pN): pNX. Number of Lymph Nodes Examined: 0 Distant Metastasis (pM): pM N/A.\"  - Restaging scans as above.  No current concerns for recurrence.  "     PAST MEDICAL HISTORY:  Past Medical History:   Diagnosis Date    Cancer of kidney, right (H)     Complication of anesthesia     slow wakeup     Malignant neoplasm of prostate metastatic to bone (H) 2/14/2019    Thyroid disease      CURRENT MEDICATIONS:   Current Outpatient Medications:     atorvastatin (LIPITOR) 20 MG tablet, Take 60 mg by mouth daily , Disp: , Rfl:     calcium citrate-vitamin D (CITRACAL) 315-250 MG-UNIT TABS per tablet, Take 650 mg by mouth 2 times daily , Disp: , Rfl:     cycloSPORINE (RESTASIS) 0.05 % ophthalmic emulsion, Place 1 drop into both eyes 2 times daily , Disp: , Rfl:     dexAMETHasone (DECADRON) 4 MG tablet, Take 1 tablet (4 mg) by mouth daily, Disp: 7 tablet, Rfl: 2    [START ON 1/30/2024] fentaNYL (DURAGESIC) 25 mcg/hr 72 hr patch, Place 1 patch onto the skin every 72 hours remove old patch., Disp: 10 patch, Rfl: 0    gabapentin (NEURONTIN) 300 MG capsule, TAKE ONE CAPSULE BY MOUTH TWICE DAILY AND 2 CAPSULES AT BEDTIME, Disp: 120 capsule, Rfl: 3    levothyroxine (SYNTHROID/LEVOTHROID) 112 MCG tablet, Take 112 mcg by mouth daily, Disp: , Rfl:     lidocaine, viscous, (XYLOCAINE) 2 % solution, Swish and swallow 15 mLs in mouth every 3 hours as needed for pain (before meals and at bedtime) ; Max 8 doses/24 hour period., Disp: 100 mL, Rfl: 1    Multiple Vitamins-Minerals (MULTIVITAMIN ADULT EXTRA C PO), Take 1 tablet by mouth every 24 hours, Disp: , Rfl:     Nutritional Supplements (SALMON OIL) CAPS, Take 2 capsules by mouth daily , Disp: , Rfl:     omeprazole (PRILOSEC) 20 MG DR capsule, Take 20 mg by mouth daily, Disp: , Rfl:     ondansetron (ZOFRAN) 8 MG tablet, Take 1 tablet (8 mg) by mouth every 8 hours as needed for nausea, Disp: 30 tablet, Rfl: 4    oxyCODONE (ROXICODONE) 5 MG tablet, Take 1-2 tablets (5-10 mg) by mouth every 3 hours as needed for severe pain, Disp: 90 tablet, Rfl: 0    predniSONE (DELTASONE) 10 MG tablet, Take 1 tablet (10 mg) by mouth daily for 21 days,  Disp: 21 tablet, Rfl: 0    prochlorperazine (COMPAZINE) 10 MG tablet, Take 1 tablet (10 mg) by mouth every 6 hours as needed for nausea or vomiting, Disp: 30 tablet, Rfl: 2    prochlorperazine (COMPAZINE) 10 MG tablet, Take 1 tablet (10 mg) by mouth every 6 hours as needed for nausea or vomiting, Disp: 90 tablet, Rfl: 1    tamsulosin (FLOMAX) 0.4 MG capsule, Take 1 capsule (0.4 mg) by mouth daily, Disp: 30 capsule, Rfl: 3    Glucosamine-Chondroit-Vit C-Mn (GLUCOSAMINE 1500 COMPLEX) CAPS, Take 1 capsule by mouth daily (Patient not taking: Reported on 12/19/2023), Disp: , Rfl:     loratadine (CLARITIN) 10 MG tablet, Take 10 mg by mouth daily (Patient not taking: Reported on 12/19/2023), Disp: , Rfl:     naloxone (NARCAN) 4 MG/0.1ML nasal spray, Spray 1 spray (4 mg) into one nostril alternating nostrils as needed for opioid reversal every 2-3 minutes until assistance arrives (Patient not taking: Reported on 12/19/2023), Disp: 0.2 mL, Rfl: 1    triamterene-HCTZ (MAXZIDE-25) 37.5-25 MG tablet, Take 1 tablet by mouth daily (Patient not taking: Reported on 12/19/2023), Disp: , Rfl:   No current facility-administered medications for this visit.    Facility-Administered Medications Ordered in Other Visits:     cabazitaxel (JEVTANA) 50 mg in sodium chloride 0.9% in non-PVC container 255 mL infusion, 20 mg/m2 (Treatment Plan Recorded), Intravenous, Once, Omar Mcnair MD    diphenhydrAMINE (BENADRYL) 25 mg in sodium chloride 0.9 % 50.5 mL intermittent infusion, 25 mg, Intravenous, Once, Omar Mcnair MD    famotidine (PEPCID) injection 20 mg, 20 mg, Intravenous, Once, Omar Mcnair MD    leuprolide (LUPRON DEPOT) kit 22.5 mg, 22.5 mg, Intramuscular, Once, Sherry Lee APRN CNP    ondansetron (ZOFRAN) injection 8 mg, 8 mg, Intravenous, Once, Omar Mcnair MD    sodium chloride 0.9% BOLUS 250 mL, 250 mL, Intravenous, Once, Omar Mcnair MD      Physical Exam:  "  /84   Pulse 70   Temp 98.2  F (36.8  C)   Resp 16   Ht 1.803 m (5' 10.98\")   Wt 122.7 kg (270 lb 6.4 oz)   SpO2 96%   BMI 37.73 kg/m    Wt Readings from Last 10 Encounters:   01/29/24 122.7 kg (270 lb 6.4 oz)   01/24/24 117.9 kg (260 lb)   01/08/24 119.6 kg (263 lb 9.6 oz)   12/20/23 117.9 kg (260 lb)   12/19/23 118.4 kg (261 lb)   12/13/23 118 kg (260 lb 1.6 oz)   11/27/23 117.9 kg (259 lb 14.4 oz)   11/15/23 117.9 kg (260 lb)   11/08/23 120.7 kg (266 lb 1.6 oz)   10/02/23 118.4 kg (261 lb)   General: The patient is a pleasant male in no acute distress.  HEENT: EOMI. Sclerae are anicteric. Mucous membranes moist without lesions or thrush.   Lymph: Neck is supple with no lymphadenopathy in the cervical or supraclavicular areas.   Heart: Regular rate and rhythm.   Lungs: Clear to auscultation bilaterally.   Abdomen: Bowel sounds present, soft, nontender with no palpable hepatosplenomegaly or masses.   Extremities: No lower extremity edema noted bilaterally.   Neuro: Cranial nerves II through XII are grossly intact.  Skin: No rashes, petechiae, or bruising noted on exposed skin.  Psych: affect bright, alert and oriented          LABORATORY DATA:  Most Recent 3 CBC's:  Recent Labs   Lab Test 01/29/24  1152 01/08/24  0945 12/19/23  1000   WBC 4.9 3.4* 3.2*   HGB 10.4* 10.4* 11.2*   MCV 91 91 88    163 171   ANEUTAUTO 3.6 2.2 2.2     Most Recent 3 BMP's:  Recent Labs   Lab Test 01/29/24  1152 01/08/24  0945 12/19/23  1000    142 140   POTASSIUM 4.0 3.9 4.0   CHLORIDE 104 108* 107   CO2 26 25 25   BUN 13.3 11.6 12.8   CR 0.97 0.91 0.88   ANIONGAP 10 9 8   BETHANY 8.9 8.6* 8.6*   * 96 108*   PROTTOTAL 6.2* 6.3* 6.5   ALBUMIN 3.8 3.9 3.9    Most Recent 3 LFT's:  Recent Labs   Lab Test 01/29/24  1152 01/08/24  0945 12/19/23  1000   AST 18 18 19   ALT 16 13 13   ALKPHOS 122 159* 210*   BILITOTAL 0.3 0.4 0.3       I reviewed the above labs today.    PSA trend, see oncology history.      R iliac " "crest biopsy from 11/21/23 without evidence for malignancy.          ASSESSMENT & PLAN: Mr. Reyes is a delightful 63 year old gentleman with metastatic castration sensitive prostate adenocarcinoma as well as an incidentally diagnosed stage III clear-cell renal cell carcinoma of the right kidney (s/p radical R nephrectomy 3/19/19), who is here for a follow-up visit and initiation of docetafor now metastatic castration-resistant prostate cancer.     1. Metastatic castration-resistant prostate cancer, with involvement of the bones and RPLN:   - Patient met the criteria for CHAARTED \"high-volume\" metastatic hormone-sensitive prostate cancer. He opted for docetaxel in combination with ADT (per JOSEFA, [id number], FELIPEEDE). He was subsequently treated with docetaxel x6 doses in the [facility] setting and enzalutamide.  He initiated Pluvicto in May 2023 with an initial slight decline in PSA, but this began to rise just prior to Cycle 3.  Given his XR evidence of progression in L femur and PSA rise, we re-evaluated his progression with PSMA PET scan in September 2023, with note of mixed response.  Reconsented for  [employer] on 8/28/23.  Due for 3 month collection at end of November 2023.  Canceled Pluvicto Dose for November due to symptomatic progression with PSA rise. PSA is labile. Biopsy results from 11/21 are pending for evaluation of NEPC vs small cell given progression without a significant rise in PSA.  Will also send peripheral blood Tempus for evaluation of targetable mutations given the negative result from the 11/21/23 bone biopsy.   -Zometa next due 1/24/2024 along with Lupron, will give both 1/29/24.   -Palliative to manage further opiate refills. Pain is well controlled with fentanyl patch.   -Dexamethasone burst for RT and one on hand if needed for pain flares.      -Cabazitaxel started 11/27/23 given progression on Pluvicto. Tolerating treamtnet well with fatigue and mild nausea. Continue Zofran and " Compazine as needed, patient to call clinic if these are not sufficient in controlling nausea.     -Labs reviewed with no concerns, ok to proceed with Cycle 4. Return to clinic in 3 weeks for cycle 5 with labs and follow up with Dr. Mcnair. Repeat imaging including CT CAP + NM bone scan scheduled 2/12.     2. Pressure in chest:  Has an ongoing sensation of pressure/need to cough in his chest, primarily against his sternum. Vital signs stable, negative pulmonary physical exam. No shortness of breath or chest pain. Chest CT ordered, will have imaging completed after infusion today. Will follow up with patient based on CT results.     3. Bone metastases:    - Noted to have osseous metastatic disease at the time of diagnosis. S/p radiation to C spine and sacrum (Re-irradiation to sacrum).   - worsening radiculopathy down starting in the low back and radiating down the left leg in June. MRI read was without new/acute abnormalities  - Pain continues to fluctuate but overall well controlled/managable   - pain mgmt per palliative care, pain is now well controlled with fentanyl patch.   - Encouraged to continue calcium and vitamin D supplementation; continue Zometa 4mg IV every ~3 months. Due next in January 2024, will give today, 1/29/24..   - RT to L femur 10/2023.  RT for bilateral posterior ribs 12/2023 with Dr. Albert.  - Added to trial list for [id number], [id number].       4. Stage 3, UISS-high risk ccRCC: s/p R nephrectomy   - Nephrectomy 3/19/19 and noted incidentally.  He is now 4.5 years post-op without evidence for recurrence.   - At this point, there is a minimal risk of recurrence of his RCC given the time since surgery without the use of adjuvant immunotherapy. There is no suspicious adenopathy or other features on his most recent imaging studies.    - Will continue to monitor with imaging planned for prostate cancer.      50 minutes spent on the date of the encounter doing chart review, review of test results,  interpretation of tests, patient visit, and documentation        LATESHA Humphreys CNP

## 2024-01-29 NOTE — PROGRESS NOTES
Infusion Nursing Note:  Walter Reyes presents today for Cycle 4 Day 1 Cabazitaxel, Zometa and Lupron.    Patient seen by provider today: Yes: Sherry Lee NP   present during visit today: Not Applicable.    Note: Patient presents to infusion today doing well. No new questions or concerns following his visit with Sherry Lee NP.    Per written communication Sherry Lee NP/ Brisa Mancini RN:  - Also due for Zometa and Lupron today  - Ordered a Chest CT following infusion, OK to discharge after CT    Patient confirms he is taking a Calcium and Vitamin D supplement at home as prescribed.    Intravenous Access:  Implanted Port.    Treatment Conditions:  Lab Results   Component Value Date    HGB 10.4 (L) 01/29/2024    WBC 4.9 01/29/2024    ANEU 5.0 07/07/2021    ANEUTAUTO 3.6 01/29/2024     01/29/2024        Lab Results   Component Value Date     01/29/2024    POTASSIUM 4.0 01/29/2024    MAG 1.9 08/25/2023    CR 0.97 01/29/2024    BETHANY 8.9 01/29/2024    BILITOTAL 0.3 01/29/2024    ALBUMIN 3.8 01/29/2024    ALT 16 01/29/2024    AST 18 01/29/2024     Results reviewed, labs MET treatment parameters, ok to proceed with treatment.    Post Infusion Assessment:  Patient tolerated infusion without incident.  Patient tolerated Lupron injection to Left Ventrogluteal without incident.  Blood return noted pre and post infusion.  Site patent and intact, free from redness, edema or discomfort.  No evidence of extravasations.  Access remains intact for CT scan following infusion.     Discharge Plan:   Prescription refills given for Prednisone.  Discharge instructions reviewed with: Patient.  Patient and/or family verbalized understanding of discharge instructions and all questions answered.  AVS to patient via [employer].  Patient will return 2/19 for next appointment.   Patient discharged in stable condition accompanied by: wife.  Departure Mode: Ambulatory.      Nati Mancini RN

## 2024-01-29 NOTE — PATIENT INSTRUCTIONS
Contact Numbers  [facility]: [phone] (for symptom and scheduling needs)    Please call the [facility] Triage line if you experience a temperature greater than or equal to 100.4, shaking chills, have uncontrolled nausea, vomiting and/or diarrhea, dizziness, shortness of breath, chest pain, bleeding, unexplained bruising, or if you have any other new/concerning symptoms, questions or concerns.     If you are having any concerning symptoms or wish to speak to a provider before your next infusion visit, please call your care coordinator or triage to notify them so we can adequately serve you.     If you need a refill on a narcotic prescription or other medication, please call triage before your infusion appointment.       January 2024 Sunday Monday Tuesday Wednesday Thursday Friday Saturday        1     2     3     4     5     6       7     8    LAB CENTRAL   9:15 AM   (15 min.)   [facility] LAB DRAW   [facility]    RETURN CCSL   9:45 AM   (45 min.)   Sherry Lee APRN CNP   [facility]    ONC INFUSION 3 HR (180 MIN)  12:00 PM   (180 min.)    ONC INFUSION NURSE   [place] [id number]     10     11     12     13       14     15     16     17     18     19     20       21     22     23     24    RETURN PALLIATIVE  10:20 AM   (40 min.)   Dean Zeng APRN CNP   [facility] 25     26     27       28     29    RETURN CCSL  11:45 AM   (45 min.)   Sherry Lee APRN CNP   [facility]    LAB CENTRAL  11:45 AM   (15 min.)   [facility] LAB DRAW   [facility]    ONC INFUSION 3 HR (180 MIN)   1:30 PM   (180 min.)    ONC INFUSION NURSE   [facility]    CT CHEST W   2:45 PM   (20 min.)   [email or web address]   [facility] 30 31 February 2024 Sunday Monday Tuesday  Wednesday Thursday Friday Saturday                       1     2     3       4     5     6     7     8     9     10       11     12    NM INJ  11:45 AM   (15 min.)   [email or web address]   [facility]    CT CHEST/ABDOMEN/PELVIS W  12:10 PM   (20 min.)   [email or web address]   [facility] 13     14     15     16     17       18     19    LAB CENTRAL   9:30 AM   (15 min.)   [facility] DRAW   [facility]    RETURN CCSL   9:45 AM   (45 min.)   Sherry Lee APRN CNP   [facility]    ONC INFUSION 3 HR (180 MIN)  11:00 AM   (180 min.)    ONC INFUSION NURSE   [facility] 20     21     22     23     24       25     26     27  Happy Birthday!     28     29                               Lab Results:  Recent Results (from the past 12 hour(s))   Comprehensive metabolic panel    Collection Time: 01/29/24 11:52 AM   Result Value Ref Range    Sodium 140 135 - 145 mmol/L    Potassium 4.0 3.4 - 5.3 mmol/L    Carbon Dioxide (CO2) 26 22 - 29 mmol/L    Anion Gap 10 7 - 15 mmol/L    Urea Nitrogen 13.3 8.0 - 23.0 mg/dL    Creatinine 0.97 0.67 - 1.17 mg/dL    GFR Estimate 89 >60 mL/min/1.73m2    Calcium 8.9 8.8 - 10.2 mg/dL    Chloride 104 98 - 107 mmol/L    Glucose 100 (H) 70 - 99 mg/dL    Alkaline Phosphatase 122 40 - 150 U/L    AST 18 0 - 45 U/L    ALT 16 0 - 70 U/L    Protein Total 6.2 (L) 6.4 - 8.3 g/dL    Albumin 3.8 3.5 - 5.2 g/dL    Bilirubin Total 0.3 <=1.2 mg/dL   CBC with platelets and differential    Collection Time: 01/29/24 11:52 AM   Result Value Ref Range    WBC Count 4.9 4.0 - 11.0 10e3/uL    RBC Count 3.44 (L) 4.40 - 5.90 10e6/uL    Hemoglobin 10.4 (L) 13.3 - 17.7 g/dL    Hematocrit 31.3 (L) 40.0 - 53.0 %    MCV 91 78 - 100 fL    MCH 30.2 26.5 - 33.0 pg    MCHC 33.2 31.5 - 36.5 g/dL    RDW 17.2 (H) 10.0 - 15.0 %    Platelet Count 216 150 - 450 10e3/uL    % Neutrophils 72 %    % Lymphocytes 10 %    % Monocytes 14 %    %  Eosinophils 1 %    % Basophils 1 %    % Immature Granulocytes 2 %    NRBCs per 100 WBC 0 <1 /100    Absolute Neutrophils 3.6 1.6 - 8.3 10e3/uL    Absolute Lymphocytes 0.5 (L) 0.8 - 5.3 10e3/uL    Absolute Monocytes 0.7 0.0 - 1.3 10e3/uL    Absolute Eosinophils 0.0 0.0 - 0.7 10e3/uL    Absolute Basophils 0.0 0.0 - 0.2 10e3/uL    Absolute Immature Granulocytes 0.1 <=0.4 10e3/uL    Absolute NRBCs 0.0 10e3/uL

## 2024-01-29 NOTE — NURSING NOTE
"Oncology Rooming Note    January 29, 2024 12:02 PM   Walter Reyes is a 63 year old male who presents for:    Chief Complaint   Patient presents with    Port Draw     Labs drawn via port by RN in lab.  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    Weight as of this encounter: 122.7 kg (270 lb 6.4 oz). Body surface area is 2.48 meters squared.  No Pain (0) Comment: Data Unavailable   No LMP for male patient.  Allergies reviewed: Yes  Medications reviewed: Yes    Medications: Medication refills not needed today.  Pharmacy name entered into EPIC:    [facility] [place], [place] - [address] [facility] - [place], [place] - [address] [facility] [place], [place] - [phone]  [facility] - [place], [place] - [address] [address]    Frailty Screening:   Is the patient here for a new oncology consult visit in cancer care? 2. No      Scott Cuba LPN              "

## 2024-01-29 NOTE — NURSING NOTE
"Chief Complaint   Patient presents with    Port Draw     Labs drawn via port by RN in lab.  VS taken       Port accessed with 20 gauge 3/4\" Power needle by RN, labs collected, line flushed with saline and heparin.  Vitals taken. Pt checked in for appointment(s).    Radha Hobbs RN    "

## 2024-01-29 NOTE — Clinical Note
1/29/2024         RE: Walter Reyes  [id number] [facility] [address]        Dear Colleague,    Thank you for referring your patient, Walter Reyes, to the [facility]. Please see a copy of my visit note below.        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        INTERVAL HISTORY:     Femur and back have been more sore , femur after exercise. Now in evenings it sometimes aches, mild.   Fatigue - worse in the afternoon/evenings     Nausea came sooner this cycle, alternating meds, then occasional.   Having both diarrhea and constipation, especially with zofran.   ***     His energy level fluctuates. Worse the week following chemo, then up and down the subsequent 2 weeks. Pain has been well controlled since starting Fentanyl patch. Numbness in bilateral big and second toe. Sometimes has burning sensation in his feet when he goes to bed.     Maintains adequate intake throughout the 3 week cycle. Has nausea the first week following chemo that becomes more mild with the secone week. Alternates oral antiemetics, takes 6-8 doses total each cycle. Having intermitent diarrhea controlled with PRN imodium. Usually doesn't need more than 3 tablets in one day. Fatigue is worse with diarrhea. No abdominal pain or cramping. Blood pressure running in the 120's systolic at home, but tends to be lower with diarrhea episodes. Maintaining adequate fluid intake.     No urinary concerns. Denies fever, chills or concerns for infection. No shortness of breath, chest pain or cough. Denies skin concerns. No bleeding.       ONCOLOGY HISTORY: Mr. 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 His oncologic history is detailed below.     1. 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 Naylor at [facility]. Prior PSA were 1.4 on 8/16/17, 2.4 on 6/28/16, 2.9 on 6/28/16, and as low as 0.4 on 3/26/2003.   - 1/08/2019: Consultation with Sarah Wilson CNP in Urology clinic. Repeat PSA 9.6.  - 1/16/2019: MRI prostate with contrast - \"This examination is characterized as PIRADS 5- very high probability. Clinically significant cancer is highly likely to be present. There is a large, invasive mass arising from the right peripheral zone and extending into the neurovascular bundle, seminal vesicle, and along the anterolateral right mesorectal fascia. Metastatic right external iliac lymph node. Metastatic lesion in the posterior/superior right acetabulum.\"  - 1/25/2019: CT abdomen and pelvis with IV contrast - \"1. Heterogeneous enhancing mass posteriorly in the upper pole of the right kidney measures 4.5 x 5.8 x 5.7 cm (AP by transverse by craniocaudal). It has a small nodular component extending posterior medially which abuts the right psoas muscle. This nodular extension measures 2.1 x 2.1 cm. Minimal stranding about the mass. No definite thrombus within the right renal vein. This renal mass is compatible with a renal cell carcinoma. A paraaortic lymph node situated immediately posterior to the left renal vein measures 1.1 cm in short axis, suspicious for metastasis. 2. A 2 cm right external iliac lymph node is also suspicious for metastases. 3. Multiple sclerotic osseous lesions suspicious for metastases. These include 0.8 and 0.6 cm sclerotic lesions laterally in the right iliac wing (images 53 and 62 respectively). Ill-defined groundglass density laterally in the right acetabulum measuring 1.7 x 1.2 cm corresponds with the " "lesion identified on MRI. A 0.4 cm groundglass density in the left acetabulum. Sclerotic lesion in the left femoral neck measures 0.9 x 1.6 cm (image 81). Sclerotic metastases would be more compatible with prostate metastases. 4. A 3 subcentimeter hepatic lesions are indeterminate. Metastases would be a consideration. These can be further characterized with liver MRI.\"  - 1/25/2019: NM bone scan - \"There is focal bony uptake in the left femoral neck, right acetabulum, right of midline at the S1 or L5 level of the spine, within multiple bilateral ribs, in the C7 or T1 level of the spine, and anteriorly within the skull. Findings are suspicious for metastases.\"  - 1/31/19: CT chest with contrast - \" No suspicious nodules in the chest.  Stable appearance of a 5.8 cm right renal mass concerning for renal carcinoma until proven otherwise.  Stable indeterminant subcentimeter hypodensities in the liver.  Multifocal osteoblastic metastasis including 2.5 cm lesion in the right fifth rib posteriorly and a 1.6 cm lesion in the right third rib posteriorly. No lytic lesions identified.\"  - 2/6/19: CT-guided right sclerotic fifth rib lesion biopsy - \"Metastatic carcinoma, consistent with prostate primary.  Immunohistochemical stains performed show the metastatic carcinoma stains positive for NKX3.1 (prostate marker), negative for STACIE 3 and PAX8, which supports the above diagnosis.\"  - 2/15/19: Started Casodex 50mg every day and consented for the biobanking protocol. 3/18/19 - stopped Casodex.  - 2/19/19: Case discussed in tumor board - recommendation for nephectomy for suspected malignant right renal mass.   - 2/26/19: Started Lupron 22.5mg every 3 months.   - 5/8/19: Started Docetaxel 75mg/m2 IV every 3 weeks. 06/18/19 - cycle 3, 7/10/19 - cycle 4, 07/31/19 - cycle 5, 08/21/19 - cycle 6.   - 10/2/19: Restaging CT CAP and NM Bone Scan showed improved osseous disease, no evidence of recurrent or new metastatic disease.  - 1/5/20: " "Restaging CT CAP and NM Bone Scan showed stable osseous disease, no evidence of recurrent or new metastatic disease.  - 4/6/20: NM bone scan with slightly improved uptake in known skeletal mets. CT C/A/P with contrast with stable osseous mets, no yusuf enlargement, no new visceral mets etc.  - 04/08/20: Zometa every 3 months.  - 10/5/20: CT C/A/P with contrast and NM bone scan - SD.   - 1/4/21: CT C/A/P with contrast and NM bone scan - SD but slight increase in right 5th rib tracer uptake and in posterior L5 sclerosis. 1/6/21  PSA = 0.29  - 03/29/21: CT C/A/P with contrast - single new right sacral 8mm bone lesion (suspicious), other bone mets stable. No visceral/yusuf disease. Nephrectomy site without recurrence. NM bone scan - SD but \"increased uptake associated with the prior lesions of the lower  cervical spine, posterior right fourth rib and right L5 posterior arch elements. Otherwise unchanged uptake associated with the proximal left femur and left anterior fourth rib. Similar uptake of the left halux, possibly secondary to degenerative osteoarthritis or gout.\"    3/31/21 PSA = 0.44  7/7/21  PSA = 0.47  11/2021 PSA = 1.05  -- Start XTANDI  12/16/21 PSA =0.22  2/11/22 PSA= 0.50  3/22/22 PSA=0.72  5/5/2022 PSA=1.79  7/11/2022 PSA=2.16 --Start cycle 1 docetaxel   8/22/22 PSA = 1.36  9/12/22 PSA = 1.09  10/24/22 Cycle #6 of Docetaxel. End of treatment  1/9/23  PSA =0.66  3/20/23  PSA=1.54  4/21/23 PSA = 1.81  T=15  5/22/23 C1 Pluvicto   06/07/23          PSA = 1.42  7/18/23 PSA=1.75, C2 Pluvicto   8/28/23 Transfer of care initial visit for Zorko.  PSA 2.06.  C3 Pluvicto scheduled for 8/30.  Proceed with dose as planned.  MR L femur, ortho referral, PSMA PET ordered for prior to follow-up.    10/2/23 Palliative RT to L femur metastasis complete.  PSMA PET with mixed response.  PSA 2.21.  RT to L femur pending.  Continue with Dose #4 of Pluvicto despite mixed response to therapy.  Dex course for possible pain flare " "with RT.    11/8/23  Follow-up prior to Dose #5 Pluvicto.  More pain in chest/ribs/back. PSA 4.38.  Testosterone=3.  Rad onc referral for ribs.  Biopsy progressive lesion for progression on Pluvicto.  Cabazitaxel scheduled for follow-up appointment.  Pending Tempus testing on biopsy sample versus peripheral blood.   11/27/23 Bx completed from R iliac crest but unrevealing for either PCa or RCC.  Start cabazitaxel C1D1.  PSA 2.81.    *** PSA     Radiation history:   -C7         3,000 cGy       06 X      8/05/2021        8/18/2021        13       10  -2 Sacrum          2,500 cGy       18 X      4/12/2022        4/18/2022         6           -Sacrum retreat               700 cGy        18 X      2/28/2023        2/28/2023  -Left femur to 2000 cGy in 5 fractions, completed 10/10/2023   -bilateral posterior chest wall at an area of osseous involvement of the ribs and adjacent T4 and 5 spine, to be delivered to 2000 cGy in 5 fractions.  completed 12/13-12/19/23.      2. Stage III ([id number]), grade 3 of 4, clear-cell RCC of right kidney:  - Incidentally diagnosed as above.   - Underwent curative-intent robotic right radical nephrectomy with Dr. Wesley Cleveland on 3/19/19. No tumor spillage per op report.   - Path showed: \"Histologic Type: Clear cell renal cell carcinoma; Sarcomatoid Features: Not identified; Histologic Grade: Nucleolar grade 3 (WHO/ISUP). Extent Tumor Size: 4.3 cm. Microscopic Tumor Extension: Tumor extension into renal sinus (in vascular structures). Margins: Negative. Tumor Necrosis: Present; focal. Lymph-Vascular Invasion: Not identified.  Pathologic Staging (pTNM) Primary Tumor (pT): pT3a: Tumor extends into renal vein branches/renal sinus. Regional Lymph Nodes (pN): pNX. Number of Lymph Nodes Examined: 0 Distant Metastasis (pM): pM N/A.\"  - Restaging scans as above.  No current concerns for recurrence.      PAST MEDICAL HISTORY:  Past Medical History:   Diagnosis Date    Cancer of kidney, right (H)     " Complication of anesthesia     slow wakeup     Malignant neoplasm of prostate metastatic to bone (H) 2/14/2019    Thyroid disease      CURRENT MEDICATIONS:   Current Outpatient Medications:     atorvastatin (LIPITOR) 20 MG tablet, Take 60 mg by mouth daily , Disp: , Rfl:     calcium citrate-vitamin D (CITRACAL) 315-250 MG-UNIT TABS per tablet, Take 650 mg by mouth 2 times daily , Disp: , Rfl:     cycloSPORINE (RESTASIS) 0.05 % ophthalmic emulsion, Place 1 drop into both eyes 2 times daily , Disp: , Rfl:     dexAMETHasone (DECADRON) 4 MG tablet, Take 1 tablet (4 mg) by mouth daily, Disp: 7 tablet, Rfl: 2    [START ON 1/30/2024] fentaNYL (DURAGESIC) 25 mcg/hr 72 hr patch, Place 1 patch onto the skin every 72 hours remove old patch., Disp: 10 patch, Rfl: 0    gabapentin (NEURONTIN) 300 MG capsule, TAKE ONE CAPSULE BY MOUTH TWICE DAILY AND 2 CAPSULES AT BEDTIME, Disp: 120 capsule, Rfl: 3    Glucosamine-Chondroit-Vit C-Mn (GLUCOSAMINE 1500 COMPLEX) CAPS, Take 1 capsule by mouth daily (Patient not taking: Reported on 12/19/2023), Disp: , Rfl:     levothyroxine (SYNTHROID/LEVOTHROID) 112 MCG tablet, Take 112 mcg by mouth daily, Disp: , Rfl:     lidocaine, viscous, (XYLOCAINE) 2 % solution, Swish and swallow 15 mLs in mouth every 3 hours as needed for pain (before meals and at bedtime) ; Max 8 doses/24 hour period., Disp: 100 mL, Rfl: 1    loratadine (CLARITIN) 10 MG tablet, Take 10 mg by mouth daily (Patient not taking: Reported on 12/19/2023), Disp: , Rfl:     Multiple Vitamins-Minerals (MULTIVITAMIN ADULT EXTRA C PO), Take 1 tablet by mouth every 24 hours, Disp: , Rfl:     naloxone (NARCAN) 4 MG/0.1ML nasal spray, Spray 1 spray (4 mg) into one nostril alternating nostrils as needed for opioid reversal every 2-3 minutes until assistance arrives (Patient not taking: Reported on 12/19/2023), Disp: 0.2 mL, Rfl: 1    Nutritional Supplements (SALMON OIL) CAPS, Take 2 capsules by mouth daily , Disp: , Rfl:     omeprazole  (PRILOSEC) 20 MG DR capsule, Take 20 mg by mouth daily, Disp: , Rfl:     ondansetron (ZOFRAN) 8 MG tablet, Take 1 tablet (8 mg) by mouth every 8 hours as needed for nausea, Disp: 30 tablet, Rfl: 4    oxyCODONE (ROXICODONE) 5 MG tablet, Take 1-2 tablets (5-10 mg) by mouth every 3 hours as needed for severe pain, Disp: 90 tablet, Rfl: 0    predniSONE (DELTASONE) 10 MG tablet, Take 1 tablet (10 mg) by mouth daily for 21 days, Disp: 21 tablet, Rfl: 0    prochlorperazine (COMPAZINE) 10 MG tablet, Take 1 tablet (10 mg) by mouth every 6 hours as needed for nausea or vomiting, Disp: 30 tablet, Rfl: 2    prochlorperazine (COMPAZINE) 10 MG tablet, Take 1 tablet (10 mg) by mouth every 6 hours as needed for nausea or vomiting, Disp: 90 tablet, Rfl: 1    tamsulosin (FLOMAX) 0.4 MG capsule, Take 1 capsule (0.4 mg) by mouth daily, Disp: 30 capsule, Rfl: 3    triamterene-HCTZ (MAXZIDE-25) 37.5-25 MG tablet, Take 1 tablet by mouth daily (Patient not taking: Reported on 12/19/2023), Disp: , Rfl:       Physical Exam:   There were no vitals taken for this visit.  Wt Readings from Last 10 Encounters:   01/24/24 117.9 kg (260 lb)   01/08/24 119.6 kg (263 lb 9.6 oz)   12/20/23 117.9 kg (260 lb)   12/19/23 118.4 kg (261 lb)   12/13/23 118 kg (260 lb 1.6 oz)   11/27/23 117.9 kg (259 lb 14.4 oz)   11/15/23 117.9 kg (260 lb)   11/08/23 120.7 kg (266 lb 1.6 oz)   10/02/23 118.4 kg (261 lb)   09/20/23 116.1 kg (256 lb)   General: The patient is a pleasant male in no acute distress.  HEENT: EOMI. Sclerae are anicteric.   Lymph: Neck is supple with no lymphadenopathy in the cervical or supraclavicular areas.   Heart: Regular rate and rhythm.   Lungs: Clear to auscultation bilaterally.   Abdomen: Bowel sounds present, soft, nontender with no palpable hepatosplenomegaly or masses.   Extremities: No lower extremity edema noted bilaterally.   Neuro: Cranial nerves II through XII are grossly intact.  Skin: No rashes, petechiae, or bruising noted on  "exposed skin.  Psych: affect bright, alert and oriented          LABORATORY DATA:  Most Recent 3 CBC's:  Recent Labs   Lab Test 01/08/24  0945 12/19/23  1000 11/27/23  1207   WBC 3.4* 3.2* 5.9   HGB 10.4* 11.2* 11.0*   MCV 91 88 89    171 243   ANEUTAUTO 2.2 2.2 4.0     Most Recent 3 BMP's:  Recent Labs   Lab Test 01/08/24  0945 12/19/23  1000 11/27/23  1207    140 141   POTASSIUM 3.9 4.0 3.6   CHLORIDE 108* 107 107   CO2 25 25 24   BUN 11.6 12.8 17.6   CR 0.91 0.88 1.02   ANIONGAP 9 8 10   BETHANY 8.6* 8.6* 8.8   GLC 96 108* 105*   PROTTOTAL 6.3* 6.5 6.7   ALBUMIN 3.9 3.9 3.8    Most Recent 3 LFT's:  Recent Labs   Lab Test 01/08/24  0945 12/19/23  1000 11/27/23  1207   AST 18 19 22   ALT 13 13 15   ALKPHOS 159* 210* 246*   BILITOTAL 0.4 0.3 0.2       I reviewed the above labs today.    PSA trend, see oncology history.      R iliac crest biopsy from 11/21/23 without evidence for malignancy.          ASSESSMENT & PLAN: Mr. Reyes is a delightful 63 year old gentleman with metastatic castration sensitive prostate adenocarcinoma as well as an incidentally diagnosed stage III clear-cell renal cell carcinoma of the right kidney (s/p radical R nephrectomy 3/19/19), who is here for a follow-up visit and initiation of docetafor now metastatic castration-resistant prostate cancer.     1. Metastatic castration-resistant prostate cancer, with involvement of the bones and RPLN:   - Patient met the criteria for ARTUROED \"high-volume\" metastatic hormone-sensitive prostate cancer. He opted for docetaxel in combination with ADT (per JOSEFA, [id number], FELIPEEDE). He was subsequently treated with docetaxel x6 doses in the [facility] setting and enzalutamide.  He initiated Pluvicto in May 2023 with an initial slight decline in PSA, but this began to rise just prior to Cycle 3.  Given his XR evidence of progression in L femur and PSA rise, we re-evaluated his progression with PSMA PET scan in September 2023, with note of " mixed response.  Reconsented for  [employer] on 8/28/23.  Due for 3 month collection at end of November 2023.  Canceled Pluvicto Dose for November due to symptomatic progression with PSA rise. PSA is labile. Biopsy results from 11/21 are pending for evaluation of NEPC vs small cell given progression without a significant rise in PSA.  Will also send peripheral blood Tempus for evaluation of targetable mutations given the negative result from the 11/21/23 bone biopsy.   -Zometa next due 1/24/2024 along with Lupron.   -Palliative to manage further opiate refills. Pain is well controlled with fentanyl patch.   -Dexamethasone burst for RT and one on hand if needed for pain flares.      -Cabazitaxel started 11/27/23 given progression on Pluvicto. Tolerating treamtnet well with fatigue, mild nausea and diarrhea. Continue Zofran and Compazine as needed, patient to call clinic if these are not sufficient in controlling nausea.     -Labs reviewed today with no concerns, ok to proceed with Cycle 3. Return to clinic in 3 weeks for cycle 4 with labs and TAHMINA prior.     2. Bone metastases:   - Noted to have osseous metastatic disease at the time of diagnosis. S/p radiation to C spine and sacrum (Re-irradiation to sacrum).   - worsening radiculopathy down starting in the low back and radiating down the left leg in June. MRI read was without new/acute abnormalities  - Pain continues to fluctuate but overall well controlled/managable   - pain mgmt per palliative care, pain is now well controlled with fentanyl patch.   - Encouraged to continue calcium and vitamin D supplementation; continue Zometa 4mg IV every ~3 months. Due next in January 2024, will give with next infusion 1/29.   - RT to L femur 10/2023.  RT for bilateral posterior ribs 12/2023 with Dr. Albert.  - Added to trial list for [id number], [id number].       3. Stage 3, UISS-high risk ccRCC: s/p R nephrectomy   - Nephrectomy 3/19/19 and noted incidentally.  He is now 4.5  years post-op without evidence for recurrence.   - At this point, there is a minimal risk of recurrence of his RCC given the time since surgery without the use of adjuvant immunotherapy. There is no suspicious adenopathy or other features on his most recent imaging studies.    - Will continue to monitor with imaging planned for prostate cancer.      *** minutes spent on the date of the encounter doing {2021 E&M time in:[id number]}        LATESHA Humphreys CNP      Again, thank you for allowing me to participate in the care of your patient.        Sincerely,        LATESHA Humphreys CNP

## 2024-02-02 ENCOUNTER — THERAPY VISIT (OUTPATIENT)
Dept: PHYSICAL THERAPY | Facility: CLINIC | Age: 64
End: 2024-02-02
Payer: COMMERCIAL

## 2024-02-02 DIAGNOSIS — C79.51 MALIGNANT NEOPLASM OF PROSTATE METASTATIC TO BONE (H): ICD-10-CM

## 2024-02-02 DIAGNOSIS — C61 MALIGNANT NEOPLASM OF PROSTATE METASTATIC TO BONE (H): ICD-10-CM

## 2024-02-02 PROCEDURE — 97110 THERAPEUTIC EXERCISES: CPT | Mod: GP

## 2024-02-02 PROCEDURE — 97161 PT EVAL LOW COMPLEX 20 MIN: CPT | Mod: GP

## 2024-02-02 NOTE — PROGRESS NOTES
PHYSICAL THERAPY EVALUATION  Type of Visit: Evaluation    See electronic medical record for Abuse and Falls Screening details.    Subjective   Walter is a 64 yo M with diagnosis of metastatic castration-sensitive prostate cancer, stage 3 clear cell RCC (s/p radical R nephrectomy on 3/19/19) which is now 4.5+ years post-therapy without evidence of recurrence. Per chart review and pt report, Walter has mets to R external iliac lymph node, R acetabulum- hitting R psoas mm, L femur, C7-T1, L5-S1, 3rd + 5th ribs, with radiation to L femur, sacrum, C7 region, ribs and vertebrae. Notes that the current plan is to have infusions every 21 days (has completed 4 out ot 6) with a PET scan in 2 weeks, will then follow up with oncologists to discuss findings and decide if current plan will be continued or if infusions will be stopped. Walter shares that he had been working part time, taking short term disability but at this time he is too fatigued to continue, so he has recently switched to using short term disability and is in the process of applying for long term disability.    Walter states that he has noticed muscle atrophy from decreased testosterone and has been having pain in L femur with activity. Recently did 3-4 15 minute bike rides on stationary bike without increased resistance, keeping chris of 70 RPM over 2 week span, noting increased pain from the last 2 rides in L femur, enough pain to discontinue activity. Trialed walking 1/2 mile with 2/10 pain following. Notes pain is not present during activity, but comes in the hours following activity. Has tried chair exercises but had spasms and pain in sacrum with this form of activity. He shares that he is coming to PT for assistance with finding an exercise program that he can perform that does not increase bone pain.     Equipment at home: bands, and hooks for bands, elliptical, spin bike (no free weights)      Presenting condition or subjective complaint: I m unable  to exercise without causing pain.  Date of onset: 01/29/24    Relevant medical history: Cancer; Overweight   Dates & types of surgery: March 2019 radical nephrecomy of right kidney    Prior diagnostic imaging/testing results: MRI; CT scan; X-ray; Bone scan     Prior therapy history for the same diagnosis, illness or injury: No      Prior Level of Function  Transfers: Independent  Ambulation: Independent  ADL: Independent    Living Environment  Social support: With a significant other or spouse   Type of home: House   Stairs to enter the home: No       Ramp: No   Stairs inside the home: Yes 1 Is there a railing: Yes   Help at home: None  Equipment owned: Straight Cane; Crutches     Employment: Yes   Hobbies/Interests: Photography, [employer] painting, reading, guitar, writing short stories    Patient goals for therapy: Find some kind of exercise to improve health while in chemo and that doesnt cause bone pain from metastatic bone lesions.    Pain assessment: No pain reported in sitting during subjective, notes pain in sacrum and L femur with activity     Objective   Objective   Cognitive Status Examination  Level of Consciousness: Alert  Follows Commands and Answers Questions: 100% of the time, Follows multi step instructions  Personal Safety and Judgement: Intact  Memory: Intact    STRENGTH: Demonstrates decreased LE, see special tests for further information    TRANSFERS: Independent    GAIT: Ambulates at slight decreased gait speed, with weight shifted to R    BALANCE: Notes a little off balance sometiems listing to the R, will bump into a wall     SPECIAL TESTS  10 Meter Walk Test (Comfortable) 1.13m/s   5 Times Sit-to-Stand (5TSTS)  20s no pain with activity      Modified CTSIB Conditions (sec) Cond 1: 30s  Cond 2: 30s with mild postural sway  Cond 4: 30s with mild postural sway  Cond 5 : 17s with moderate postural sway     Assessment & Plan   CLINICAL IMPRESSIONS  Medical Diagnosis:       Treatment Diagnosis: Force production deficit   Impression/Assessment: Patient reports  The following significant findings have been identified: Pain, Decreased strength, Impaired balance, Decreased activity tolerance, and Instability. These impairments interfere with their ability to perform self care tasks, recreational activities, household mobility, and community mobility as compared to previous level of function. Testing as reported above indicate decreased safety and balance with functional mobility. This patient will benefit from skilled physical therapy services to address these concerns.      Clinical Decision Making (Complexity):  Clinical Presentation: Stable/Uncomplicated  Clinical Presentation Rationale: based on medical and personal factors listed in PT evaluation  Clinical Decision Making (Complexity): Low complexity    PLAN OF CARE  Treatment Interventions:  Interventions: Gait Training, Manual Therapy, Neuromuscular Re-education, Therapeutic Activity, Therapeutic Exercise, Self-Care/Home Management, Standardized Testing    Long Term Goals     PT Goal 1  Goal Identifier: HEP  Goal Description: Pt will be able to demonstrate HEP activities without need for cues from provider to demonstrate understanding and independence with HEP.  Rationale: to maximize safety and independence with performance of ADLs and functional tasks  Target Date: 04/12/24  PT Goal 2  Goal Identifier: LE strength  Goal Description: Pt will be able to perform 5xSTS in 12 seconds or less to demonstrate appropriate LE strength for community dwelling adults.  Rationale: to maximize safety and independence with performance of ADLs and functional tasks  Goal Progress: Eval: 20s  Target Date: 04/12/24  PT Goal 3  Goal Identifier: Activity tolerance  Goal Description: Pt will report improved activity tolerance as shown by ability to perform HEP created for 2 weeks in a row without L femur or sacral pain to allow for continued strength  training and functional mobility.  Target Date: 04/12/24  PT Goal 4  Goal Identifier: Gait speed  Goal Description: Pt will demonstrate a gait speed greater than 1.2m/s to demonstrate improved safety with crossing streets.  Rationale: to maximize safety and independence with performance of ADLs and functional tasks  Goal Progress: Eval: 1.13m/s  Target Date: 04/12/24  PT Goal 5  Goal Identifier: Static balance  Goal Description: Pt will be able to maintain static stance on foam mat with EC for at least 30 seconds to demonstrate improved balance  Rationale: to maximize safety and independence with performance of ADLs and functional tasks  Goal Progress: Eval: 17s with moderate postural sway  Target Date: 04/12/24      Frequency of Treatment: 1x/week  Duration of Treatment: 10 weeks    Recommended Referrals to Other Professionals:  None at this time  Education Assessment:   Learner/Method: Patient;Listening;Demonstration;Pictures/Video    Risks and benefits of evaluation/treatment have been explained.   Patient/Family/caregiver agrees with Plan of Care.     Evaluation Time:     PT Eval, Low Complexity Minutes (14888): 15     Signing Clinician: Monet Raya, PT, DPT

## 2024-02-05 ENCOUNTER — THERAPY VISIT (OUTPATIENT)
Dept: PHYSICAL THERAPY | Facility: CLINIC | Age: 64
End: 2024-02-05
Payer: COMMERCIAL

## 2024-02-05 DIAGNOSIS — C79.51 MALIGNANT NEOPLASM OF PROSTATE METASTATIC TO BONE (H): Primary | ICD-10-CM

## 2024-02-05 DIAGNOSIS — C61 PROSTATE CANCER (H): ICD-10-CM

## 2024-02-05 DIAGNOSIS — M54.10 RADICULOPATHY, UNSPECIFIED SPINAL REGION: ICD-10-CM

## 2024-02-05 DIAGNOSIS — C61 MALIGNANT NEOPLASM OF PROSTATE METASTATIC TO BONE (H): Primary | ICD-10-CM

## 2024-02-05 PROCEDURE — 97110 THERAPEUTIC EXERCISES: CPT | Mod: GP | Performed by: PHYSICAL THERAPIST

## 2024-02-05 RX ORDER — GABAPENTIN 300 MG/1
CAPSULE ORAL
Qty: 120 CAPSULE | Refills: 3 | Status: SHIPPED | OUTPATIENT
Start: 2024-02-05 | End: 2024-04-22

## 2024-02-05 NOTE — TELEPHONE ENCOUNTER
Received Spontactst message from patient requesting refill of gabapentin.     Last refill: 1/4/24  Last office visit: 1/24/24  Scheduled for follow up per checkout request.     Will route request to NP for review.     Reviewed [place]  Report.

## 2024-02-12 ENCOUNTER — THERAPY VISIT (OUTPATIENT)
Dept: PHYSICAL THERAPY | Facility: CLINIC | Age: 64
End: 2024-02-12
Payer: COMMERCIAL

## 2024-02-12 ENCOUNTER — HOSPITAL ENCOUNTER (OUTPATIENT)
Dept: NUCLEAR MEDICINE | Facility: CLINIC | Age: 64
Setting detail: NUCLEAR MEDICINE
Discharge: HOME | End: 2024-02-12
Attending: STUDENT IN AN ORGANIZED HEALTH CARE EDUCATION/TRAINING PROGRAM
Payer: COMMERCIAL

## 2024-02-12 ENCOUNTER — HOSPITAL ENCOUNTER (OUTPATIENT)
Dept: CT IMAGING | Facility: CLINIC | Age: 64
Discharge: HOME | End: 2024-02-12
Attending: STUDENT IN AN ORGANIZED HEALTH CARE EDUCATION/TRAINING PROGRAM | Admitting: STUDENT IN AN ORGANIZED HEALTH CARE EDUCATION/TRAINING PROGRAM
Payer: COMMERCIAL

## 2024-02-12 DIAGNOSIS — C61 HORMONE RESISTANT PROSTATE CANCER (H): ICD-10-CM

## 2024-02-12 DIAGNOSIS — C79.51 MALIGNANT NEOPLASM OF PROSTATE METASTATIC TO BONE (H): Primary | ICD-10-CM

## 2024-02-12 DIAGNOSIS — C61 MALIGNANT NEOPLASM OF PROSTATE METASTATIC TO BONE (H): Primary | ICD-10-CM

## 2024-02-12 DIAGNOSIS — C79.51 MALIGNANT NEOPLASM OF PROSTATE METASTATIC TO BONE (H): ICD-10-CM

## 2024-02-12 DIAGNOSIS — Z19.2 HORMONE RESISTANT PROSTATE CANCER (H): ICD-10-CM

## 2024-02-12 DIAGNOSIS — C61 MALIGNANT NEOPLASM OF PROSTATE METASTATIC TO BONE (H): ICD-10-CM

## 2024-02-12 PROCEDURE — A9503 TC99M MEDRONATE: HCPCS | Performed by: STUDENT IN AN ORGANIZED HEALTH CARE EDUCATION/TRAINING PROGRAM

## 2024-02-12 PROCEDURE — 78306 BONE IMAGING WHOLE BODY: CPT

## 2024-02-12 PROCEDURE — 78306 BONE IMAGING WHOLE BODY: CPT | Mod: 26 | Performed by: RADIOLOGY

## 2024-02-12 PROCEDURE — 343N000001 HC RX 343: Performed by: STUDENT IN AN ORGANIZED HEALTH CARE EDUCATION/TRAINING PROGRAM

## 2024-02-12 PROCEDURE — 71260 CT THORAX DX C+: CPT

## 2024-02-12 PROCEDURE — 74177 CT ABD & PELVIS W/CONTRAST: CPT | Mod: 26 | Performed by: RADIOLOGY

## 2024-02-12 PROCEDURE — 97112 NEUROMUSCULAR REEDUCATION: CPT | Mod: GP | Performed by: PHYSICAL THERAPIST

## 2024-02-12 PROCEDURE — 97110 THERAPEUTIC EXERCISES: CPT | Mod: GP | Performed by: PHYSICAL THERAPIST

## 2024-02-12 PROCEDURE — 250N000011 HC RX IP 250 OP 636: Performed by: STUDENT IN AN ORGANIZED HEALTH CARE EDUCATION/TRAINING PROGRAM

## 2024-02-12 PROCEDURE — 71260 CT THORAX DX C+: CPT | Mod: 26 | Performed by: RADIOLOGY

## 2024-02-12 RX ORDER — IOPAMIDOL 755 MG/ML
135 INJECTION, SOLUTION INTRAVASCULAR ONCE
Status: COMPLETED | OUTPATIENT
Start: 2024-02-12 | End: 2024-02-12

## 2024-02-12 RX ORDER — TC 99M MEDRONATE 20 MG/10ML
20-30 INJECTION, POWDER, LYOPHILIZED, FOR SOLUTION INTRAVENOUS ONCE
Status: COMPLETED | OUTPATIENT
Start: 2024-02-12 | End: 2024-02-12

## 2024-02-12 RX ADMIN — IOPAMIDOL 135 ML: 755 INJECTION, SOLUTION INTRAVENOUS at 12:08

## 2024-02-12 RX ADMIN — TC 99M MEDRONATE 27 MILLICURIE: 20 INJECTION, POWDER, LYOPHILIZED, FOR SOLUTION INTRAVENOUS at 11:49

## 2024-02-13 ENCOUNTER — ONCOLOGY VISIT (OUTPATIENT)
Dept: TRANSPLANT | Facility: CLINIC | Age: 64
End: 2024-02-13
Payer: COMMERCIAL

## 2024-02-13 VITALS
BODY MASS INDEX: 37.35 KG/M2 | RESPIRATION RATE: 16 BRPM | WEIGHT: 267.7 LBS | TEMPERATURE: 98.1 F | DIASTOLIC BLOOD PRESSURE: 84 MMHG | OXYGEN SATURATION: 98 % | HEART RATE: 82 BPM | SYSTOLIC BLOOD PRESSURE: 129 MMHG

## 2024-02-13 DIAGNOSIS — Z76.89 PREVENTION OF CHEMOTHERAPY-INDUCED NEUTROPENIA: ICD-10-CM

## 2024-02-13 DIAGNOSIS — C61 MALIGNANT NEOPLASM OF PROSTATE METASTATIC TO BONE (H): ICD-10-CM

## 2024-02-13 DIAGNOSIS — Z19.2 HORMONE RESISTANT PROSTATE CANCER (H): ICD-10-CM

## 2024-02-13 DIAGNOSIS — C61 HORMONE RESISTANT PROSTATE CANCER (H): ICD-10-CM

## 2024-02-13 DIAGNOSIS — C79.51 MALIGNANT NEOPLASM OF PROSTATE METASTATIC TO BONE (H): ICD-10-CM

## 2024-02-13 DIAGNOSIS — R91.8 PULMONARY NODULES: Primary | ICD-10-CM

## 2024-02-13 PROCEDURE — 99417 PROLNG OP E/M EACH 15 MIN: CPT

## 2024-02-13 PROCEDURE — 99213 OFFICE O/P EST LOW 20 MIN: CPT

## 2024-02-13 PROCEDURE — 99215 OFFICE O/P EST HI 40 MIN: CPT

## 2024-02-13 RX ORDER — ALBUTEROL SULFATE 90 UG/1
1-2 AEROSOL, METERED RESPIRATORY (INHALATION)
Status: CANCELLED
Start: 2024-02-19

## 2024-02-13 RX ORDER — PALONOSETRON 0.05 MG/ML
0.25 INJECTION, SOLUTION INTRAVENOUS ONCE
Status: CANCELLED | OUTPATIENT
Start: 2024-02-19

## 2024-02-13 RX ORDER — ALBUTEROL SULFATE 0.83 MG/ML
2.5 SOLUTION RESPIRATORY (INHALATION)
Status: CANCELLED | OUTPATIENT
Start: 2024-02-19

## 2024-02-13 RX ORDER — EPINEPHRINE 1 MG/ML
0.3 INJECTION, SOLUTION INTRAMUSCULAR; SUBCUTANEOUS EVERY 5 MIN PRN
Status: CANCELLED | OUTPATIENT
Start: 2024-02-19

## 2024-02-13 RX ORDER — HEPARIN SODIUM (PORCINE) LOCK FLUSH IV SOLN 100 UNIT/ML 100 UNIT/ML
5 SOLUTION INTRAVENOUS
Status: CANCELLED | OUTPATIENT
Start: 2024-02-19

## 2024-02-13 RX ORDER — HEPARIN SODIUM,PORCINE 10 UNIT/ML
5-20 VIAL (ML) INTRAVENOUS DAILY PRN
Status: CANCELLED | OUTPATIENT
Start: 2024-02-19

## 2024-02-13 RX ORDER — MEPERIDINE HYDROCHLORIDE 25 MG/ML
25 INJECTION INTRAMUSCULAR; INTRAVENOUS; SUBCUTANEOUS EVERY 30 MIN PRN
Status: CANCELLED | OUTPATIENT
Start: 2024-02-19

## 2024-02-13 RX ORDER — METHYLPREDNISOLONE SODIUM SUCCINATE 125 MG/2ML
125 INJECTION, POWDER, LYOPHILIZED, FOR SOLUTION INTRAMUSCULAR; INTRAVENOUS
Status: CANCELLED
Start: 2024-02-19

## 2024-02-13 RX ORDER — DIPHENHYDRAMINE HYDROCHLORIDE 50 MG/ML
50 INJECTION INTRAMUSCULAR; INTRAVENOUS
Status: CANCELLED
Start: 2024-02-19

## 2024-02-13 RX ORDER — LORAZEPAM 2 MG/ML
0.5 INJECTION INTRAMUSCULAR EVERY 4 HOURS PRN
Status: CANCELLED | OUTPATIENT
Start: 2024-02-19

## 2024-02-13 ASSESSMENT — PAIN SCALES - GENERAL: PAINLEVEL: NO PAIN (0)

## 2024-02-13 NOTE — NURSING NOTE
"Oncology Rooming Note    February 13, 2024 1:59 PM   Walter Reyes is a 63 year old male who presents for:    Chief Complaint   Patient presents with    Oncology Clinic Visit     Metastatic Prostate Cancer     Initial Vitals: /84 (BP Location: Right arm, Patient Position: Sitting, Cuff Size: Adult Large)   Pulse 82   Temp 98.1  F (36.7  C) (Oral)   Resp 16   Wt 121.4 kg (267 lb 11.2 oz)   SpO2 98%   BMI 37.35 kg/m   Estimated body mass index is 37.35 kg/m  as calculated from the following:    Height as of 1/29/24: 1.803 m (5' 10.98\").    Weight as of this encounter: 121.4 kg (267 lb 11.2 oz). Body surface area is 2.47 meters squared.  No Pain (0) Comment: Data Unavailable   No LMP for male patient.  Allergies reviewed: Yes  Medications reviewed: Yes    Medications: Medication refills not needed today.  Pharmacy name entered into EPIC:    [facility] [place], [place] - [address] [facility] - [place], [place] - [address] [place] [address] PHARMACY [place], [place] - [address] [place] [address] [phone]  [facility] [place], [place] - [address] [facility]    Frailty Screening:   Is the patient here for a new oncology consult visit in cancer care? 2. No      Clinical concerns: None       Meena See LPN  2/13/2024              "

## 2024-02-13 NOTE — LETTER
"    2/13/2024         RE: Walter Reyes  [address] [facility] [id number]        Dear Colleague,    Thank you for referring your patient, Walter Reyes, to the [facility] BLOOD AND MARROW TRANSPLANT PROGRAM [place]. Please see a copy of my visit note below.        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        INTERVAL HISTORY:     Taking longer to recover from each chemo cycle.  Usually middle of second week before he starts to recover.  Working with PT. Some leg pain, particularly with extended walking or riding bike.  Neuropathy in feet is present.  Taking gabapentin for this with good effect.  Some burning/shock type feelings, but these are intermittent. Still with discomfort in anterior chest wall/sternum.  No fevers, chills, cough, shortness of breath or night sweats concerning for presence of pulmonary infection.      Review of Systems:  Complete ROS reviewed and otherwise negative except as listed in Interval History.      ONCOLOGY HISTORY: Mr. Walter Reyes is a 63 year old gentleman with a metastatic castration-sensitive prostate cancer and incidentally diagnosed stage 3 clear cell RCC (s/p radical R nephrectomy on 3/19/19) which is now 4.5+ years post-therapy without evidence of recurrence. His oncologic history is detailed below.     1. 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 Naylor at [facility]. Prior PSA were 1.4 on 8/16/17, 2.4 on 6/28/16, 2.9 on 6/28/16, and as low as 0.4 on 3/26/2003.   - 1/08/2019: Consultation with Sarah Wilson CNP in Urology clinic. Repeat PSA 9.6.  - 1/16/2019: MRI prostate with contrast - \"This examination is characterized as PIRADS 5- very high " "probability. Clinically significant cancer is highly likely to be present. There is a large, invasive mass arising from the right peripheral zone and extending into the neurovascular bundle, seminal vesicle, and along the anterolateral right mesorectal fascia. Metastatic right external iliac lymph node. Metastatic lesion in the posterior/superior right acetabulum.\"  - 1/25/2019: CT abdomen and pelvis with IV contrast - \"1. Heterogeneous enhancing mass posteriorly in the upper pole of the right kidney measures 4.5 x 5.8 x 5.7 cm (AP by transverse by craniocaudal). It has a small nodular component extending posterior medially which abuts the right psoas muscle. This nodular extension measures 2.1 x 2.1 cm. Minimal stranding about the mass. No definite thrombus within the right renal vein. This renal mass is compatible with a renal cell carcinoma. A paraaortic lymph node situated immediately posterior to the left renal vein measures 1.1 cm in short axis, suspicious for metastasis. 2. A 2 cm right external iliac lymph node is also suspicious for metastases. 3. Multiple sclerotic osseous lesions suspicious for metastases. These include 0.8 and 0.6 cm sclerotic lesions laterally in the right iliac wing (images 53 and 62 respectively). Ill-defined groundglass density laterally in the right acetabulum measuring 1.7 x 1.2 cm corresponds with the lesion identified on MRI. A 0.4 cm groundglass density in the left acetabulum. Sclerotic lesion in the left femoral neck measures 0.9 x 1.6 cm (image 81). Sclerotic metastases would be more compatible with prostate metastases. 4. A 3 subcentimeter hepatic lesions are indeterminate. Metastases would be a consideration. These can be further characterized with liver MRI.\"  - 1/25/2019: NM bone scan - \"There is focal bony uptake in the left femoral neck, right acetabulum, right of midline at the S1 or L5 level of the spine, within multiple bilateral ribs, in the C7 or T1 level of the " "spine, and anteriorly within the skull. Findings are suspicious for metastases.\"  - 1/31/19: CT chest with contrast - \" No suspicious nodules in the chest.  Stable appearance of a 5.8 cm right renal mass concerning for renal carcinoma until proven otherwise.  Stable indeterminant subcentimeter hypodensities in the liver.  Multifocal osteoblastic metastasis including 2.5 cm lesion in the right fifth rib posteriorly and a 1.6 cm lesion in the right third rib posteriorly. No lytic lesions identified.\"  - 2/6/19: CT-guided right sclerotic fifth rib lesion biopsy - \"Metastatic carcinoma, consistent with prostate primary.  Immunohistochemical stains performed show the metastatic carcinoma stains positive for NKX3.1 (prostate marker), negative for STACIE 3 and PAX8, which supports the above diagnosis.\"  - 2/15/19: Started Casodex 50mg every day and consented for the biobanking protocol. 3/18/19 - stopped Casodex.  - 2/19/19: Case discussed in tumor board - recommendation for nephectomy for suspected malignant right renal mass.   - 2/26/19: Started Lupron 22.5mg every 3 months.   - 5/8/19: Started Docetaxel 75mg/m2 IV every 3 weeks. 06/18/19 - cycle 3, 7/10/19 - cycle 4, 07/31/19 - cycle 5, 08/21/19 - cycle 6.   - 10/2/19: Restaging CT CAP and NM Bone Scan showed improved osseous disease, no evidence of recurrent or new metastatic disease.  - 1/5/20: Restaging CT CAP and NM Bone Scan showed stable osseous disease, no evidence of recurrent or new metastatic disease.  - 4/6/20: NM bone scan with slightly improved uptake in known skeletal mets. CT C/A/P with contrast with stable osseous mets, no yusuf enlargement, no new visceral mets etc.  - 04/08/20: Zometa every 3 months.  - 10/5/20: CT C/A/P with contrast and NM bone scan - SD.   - 1/4/21: CT C/A/P with contrast and NM bone scan - SD but slight increase in right 5th rib tracer uptake and in posterior L5 sclerosis. 1/6/21  PSA = 0.29  - 03/29/21: CT C/A/P with contrast - " "single new right sacral 8mm bone lesion (suspicious), other bone mets stable. No visceral/yusuf disease. Nephrectomy site without recurrence. NM bone scan - SD but \"increased uptake associated with the prior lesions of the lower  cervical spine, posterior right fourth rib and right L5 posterior arch elements. Otherwise unchanged uptake associated with the proximal left femur and left anterior fourth rib. Similar uptake of the left halux, possibly secondary to degenerative osteoarthritis or gout.\"    3/31/21 PSA = 0.44  7/7/21  PSA = 0.47  11/2021 PSA = 1.05  -- Start XTANDI  12/16/21 PSA =0.22  2/11/22 PSA= 0.50  3/22/22 PSA=0.72  5/5/2022 PSA=1.79  7/11/2022 PSA=2.16 --Start cycle 1 docetaxel   8/22/22 PSA = 1.36  9/12/22 PSA = 1.09  10/24/22 Cycle #6 of Docetaxel. End of treatment  1/9/23  PSA =0.66  3/20/23  PSA=1.54  4/21/23 PSA = 1.81  T=15  5/22/23 C1 Pluvicto   06/07/23          PSA = 1.42  7/18/23 PSA=1.75, C2 Pluvicto   8/28/23 Transfer of care initial visit for Tabby.  PSA 2.06.  C3 Pluvicto scheduled for 8/30.  Proceed with dose as planned.  MR L femur, ortho referral, PSMA PET ordered for prior to follow-up.    10/2/23 Palliative RT to L femur metastasis complete.  PSMA PET with mixed response.  PSA 2.21.  RT to L femur pending.  Continue with Dose #4 of Pluvicto despite mixed response to therapy.  Dex course for possible pain flare with RT.    11/8/23  Follow-up prior to Dose #5 Pluvicto.  More pain in chest/ribs/back. PSA 4.38.  Testosterone=3.  Rad onc referral for ribs.  Biopsy progressive lesion for progression on Pluvicto.  Cabazitaxel scheduled for follow-up appointment.  Pending Tempus testing on biopsy sample versus peripheral blood.   11/27/23 Bx completed from R iliac crest but unrevealing for either PCa or RCC.  Start cabazitaxel C1D1.  PSA 2.81.    12/19/23 PSA = 3.66  1/8/24 PSA = 3.55  1/29/24 PSA = 3.07   2/13/24-Restaging bone scan with significant areas of osseous progresison.  " "Transition to carbo/cabazitaxel with neulasta support.      Radiation history:   -C7         3,000 cGy       06 X      8/05/2021        8/18/2021        13       10  -2 Sacrum          2,500 cGy       18 X      4/12/2022        4/18/2022         6           -Sacrum retreat               700 cGy        18 X      2/28/2023        2/28/2023  -Left femur to 2000 cGy in 5 fractions, completed 10/10/2023   -bilateral posterior chest wall at an area of osseous involvement of the ribs and adjacent T4 and 5 spine, to be delivered to 2000 cGy in 5 fractions.  completed 12/13-12/19/23.    2. Stage III ([id number]), grade 3 of 4, clear-cell RCC of right kidney:  - Incidentally diagnosed as above.   - Underwent curative-intent robotic right radical nephrectomy with Dr. Wesley Cleveland on 3/19/19. No tumor spillage per op report.   - Path showed: \"Histologic Type: Clear cell renal cell carcinoma; Sarcomatoid Features: Not identified; Histologic Grade: Nucleolar grade 3 (WHO/ISUP). Extent Tumor Size: 4.3 cm. Microscopic Tumor Extension: Tumor extension into renal sinus (in vascular structures). Margins: Negative. Tumor Necrosis: Present; focal. Lymph-Vascular Invasion: Not identified.  Pathologic Staging (pTNM) Primary Tumor (pT): pT3a: Tumor extends into renal vein branches/renal sinus. Regional Lymph Nodes (pN): pNX. Number of Lymph Nodes Examined: 0 Distant Metastasis (pM): pM N/A.\"  - Restaging scans as above.  No current concerns for recurrence.      PAST MEDICAL HISTORY:  Past Medical History:   Diagnosis Date    Cancer of kidney, right (H)     Complication of anesthesia     slow wakeup     Malignant neoplasm of prostate metastatic to bone (H) 2/14/2019    Thyroid disease      CURRENT MEDICATIONS:   Current Outpatient Medications:     loratadine (CLARITIN) 10 MG tablet, Take 10 mg by mouth daily, Disp: , Rfl:     atorvastatin (LIPITOR) 20 MG tablet, Take 60 mg by mouth daily , Disp: , Rfl:     calcium citrate-vitamin D " (CITRACAL) 315-250 MG-UNIT TABS per tablet, Take 650 mg by mouth 2 times daily , Disp: , Rfl:     cycloSPORINE (RESTASIS) 0.05 % ophthalmic emulsion, Place 1 drop into both eyes 2 times daily , Disp: , Rfl:     dexAMETHasone (DECADRON) 4 MG tablet, Take 1 tablet (4 mg) by mouth daily, Disp: 7 tablet, Rfl: 2    fentaNYL (DURAGESIC) 25 mcg/hr 72 hr patch, Place 1 patch onto the skin every 72 hours remove old patch., Disp: 10 patch, Rfl: 0    gabapentin (NEURONTIN) 300 MG capsule, TAKE ONE CAPSULE BY MOUTH TWICE DAILY AND 2 CAPSULES AT BEDTIME, Disp: 120 capsule, Rfl: 3    levothyroxine (SYNTHROID/LEVOTHROID) 112 MCG tablet, Take 112 mcg by mouth daily, Disp: , Rfl:     lidocaine, viscous, (XYLOCAINE) 2 % solution, Swish and swallow 15 mLs in mouth every 3 hours as needed for pain (before meals and at bedtime) ; Max 8 doses/24 hour period., Disp: 100 mL, Rfl: 1    Multiple Vitamins-Minerals (MULTIVITAMIN ADULT EXTRA C PO), Take 1 tablet by mouth every 24 hours, Disp: , Rfl:     naloxone (NARCAN) 4 MG/0.1ML nasal spray, Spray 1 spray (4 mg) into one nostril alternating nostrils as needed for opioid reversal every 2-3 minutes until assistance arrives, Disp: 0.2 mL, Rfl: 1    Nutritional Supplements (SALMON OIL) CAPS, Take 2 capsules by mouth daily , Disp: , Rfl:     omeprazole (PRILOSEC) 20 MG DR capsule, Take 20 mg by mouth daily, Disp: , Rfl:     ondansetron (ZOFRAN) 8 MG tablet, Take 1 tablet (8 mg) by mouth every 8 hours as needed for nausea, Disp: 30 tablet, Rfl: 4    oxyCODONE (ROXICODONE) 5 MG tablet, Take 1-2 tablets (5-10 mg) by mouth every 3 hours as needed for severe pain, Disp: 90 tablet, Rfl: 0    tamsulosin (FLOMAX) 0.4 MG capsule, Take 1 capsule (0.4 mg) by mouth daily, Disp: 30 capsule, Rfl: 3    triamterene-HCTZ (MAXZIDE-25) 37.5-25 MG tablet, Take 1 tablet by mouth daily (Patient not taking: Reported on 12/19/2023), Disp: , Rfl:       Physical Exam:   /84 (BP Location: Right arm, Patient Position:  Sitting, Cuff Size: Adult Large)   Pulse 82   Temp 98.1  F (36.7  C) (Oral)   Resp 16   Wt 121.4 kg (267 lb 11.2 oz)   SpO2 98%   BMI 37.35 kg/m    Wt Readings from Last 10 Encounters:   02/13/24 121.4 kg (267 lb 11.2 oz)   01/29/24 122.7 kg (270 lb 6.4 oz)   01/24/24 117.9 kg (260 lb)   01/08/24 119.6 kg (263 lb 9.6 oz)   12/20/23 117.9 kg (260 lb)   12/19/23 118.4 kg (261 lb)   12/13/23 118 kg (260 lb 1.6 oz)   11/27/23 117.9 kg (259 lb 14.4 oz)   11/15/23 117.9 kg (260 lb)   11/08/23 120.7 kg (266 lb 1.6 oz)   General: The patient is a pleasant male in no acute distress.  HEENT: EOMI. Sclerae are anicteric. Mucous membranes moist without lesions or thrush.   Lymph: Neck is supple, no overt adenopathy.   Heart: Regular rate and rhythm.   Lungs: Normal WOB on RA.   Abdomen: Bowel sounds present, soft, nontender with no palpable hepatosplenomegaly or masses.   Extremities: No lower extremity edema noted bilaterally.   Neuro: Cranial nerves II through XII are grossly intact.  Skin: No rashes, petechiae, or bruising noted on exposed skin.  Psych: affect normal, alert and oriented    LABORATORY DATA:  Most Recent 3 CBC's:  Recent Labs   Lab Test 01/29/24  1152 01/08/24  0945 12/19/23  1000   WBC 4.9 3.4* 3.2*   HGB 10.4* 10.4* 11.2*   MCV 91 91 88    163 171   ANEUTAUTO 3.6 2.2 2.2     Most Recent 3 BMP's:  Recent Labs   Lab Test 01/29/24  1152 01/08/24  0945 12/19/23  1000    142 140   POTASSIUM 4.0 3.9 4.0   CHLORIDE 104 108* 107   CO2 26 25 25   BUN 13.3 11.6 12.8   CR 0.97 0.91 0.88   ANIONGAP 10 9 8   BETHANY 8.9 8.6* 8.6*   * 96 108*   PROTTOTAL 6.2* 6.3* 6.5   ALBUMIN 3.8 3.9 3.9    Most Recent 3 LFT's:  Recent Labs   Lab Test 01/29/24  1152 01/08/24  0945 12/19/23  1000   AST 18 18 19   ALT 16 13 13   ALKPHOS 122 159* 210*   BILITOTAL 0.3 0.4 0.3       I reviewed the above labs today.    PSA trend, see oncology history.      R iliac crest biopsy from 11/21/23 without evidence for  "malignancy.      ASSESSMENT & PLAN: Mr. Reyes is a delightful 63 year old gentleman with metastatic castration sensitive prostate adenocarcinoma as well as an incidentally diagnosed stage III clear-cell renal cell carcinoma of the right kidney (s/p radical R nephrectomy 3/19/19), who is here for a follow-up visit and initiation of docetafor now metastatic castration-resistant prostate cancer.     1. Metastatic castration-resistant prostate cancer, with involvement of the bones and RPLN:   - Patient met the criteria for CHAARTED \"high-volume\" metastatic hormone-sensitive prostate cancer. He opted for docetaxel in combination with ADT (per JOSEFA, [id number], STAMPEDE). He was subsequently treated with docetaxel x6 doses in the [facility] setting and enzalutamide.  He initiated Pluvicto in May 2023 with an initial slight decline in PSA, but this began to rise just prior to Cycle 3.  Given his XR evidence of progression in L femur and PSA rise, we re-evaluated his progression with PSMA PET scan in September 2023, with note of mixed response.  Reconsented for  [employer] on 8/28/23.  Due for 3 month collection at end of November 2023.  Canceled Pluvicto Dose for November due to symptomatic progression with PSA rise. PSA is labile. Biopsy results from 11/21 are pending for evaluation of NEPC vs small cell given progression without a significant rise in PSA.  Will also send peripheral blood Tempus for evaluation of targetable mutations given the negative result from the 11/21/23 bone biopsy, but blood Tempus study for targetable mutations was unrevealing.  -Zometa next due 1/24/2024 along with Lupron, will give both 1/29/24.   -Palliative to manage further opiate refills. Pain is well controlled with fentanyl patch.   -Dexamethasone burst for RT and one on hand if needed for pain flares.      -Cabazitaxel started 11/27/23 given progression on Pluvicto. 5 cycles of cabazitaxel with progression noted on 2/12/24 NM bone " scan.    -Transition to carboplatin/cabazitaxel with neulasta support.  To start on 2/19.      2. Pressure in chest:  Has an ongoing sensation of pressure/need to cough in his chest, primarily against his sternum. Vital signs stable, negative pulmonary physical exam. No shortness of breath or chest pain. This is most likely due to progressive bone metastases noted on NM bone scan from 2/12/24.      3. Bone metastases:    - Noted to have osseous metastatic disease at the time of diagnosis. S/p radiation to C spine and sacrum (Re-irradiation to sacrum).   - worsening radiculopathy down starting in the low back and radiating down the left leg in June. MRI read was without new/acute abnormalities  - Pain continues to fluctuate but overall well controlled/managable   - pain mgmt per palliative care, pain is now well controlled with fentanyl patch.   - Encouraged to continue calcium and vitamin D supplementation; continue Zometa 4mg IV every ~3 months. Due next in January 2024, will give today, 1/29/24..   - RT to L femur 10/2023.  RT for bilateral posterior ribs 12/2023 with Dr. Albert.  - Added to trial list for [id number].  He prefers to wait for DTI referral for consideration of JORGE ALBERTO-280.       4. Stage 3, UISS-high risk ccRCC: s/p R nephrectomy   - Nephrectomy 3/19/19 and noted incidentally.  He is now 4.5 years post-op without evidence for recurrence.   - At this point, there is a minimal risk of recurrence of his RCC given the time since surgery without the use of adjuvant immunotherapy. There is no suspicious adenopathy or other features on his most recent imaging studies.    - Will continue to monitor with imaging planned for prostate cancer.      PLAN:   -Add carboplatin to cabazitaxel starting 2/19.  Neulasta support.  Finance approval requested 2/13.   -Fungal/infectious workup added to 2/19 labs.    -Return with Tabby 3 weeks after chemotherapy for cycle 2.    -Hold on DTI referral for now for JORGE ALBERTO-280 wait  list.      A total of 65 minutes were spent on this patient on the day of the encounter, of which more than 50% of this time was used for counseling and coordination of care.  The patient was given the opportunity to ask multiple questions today, all of which were answered to their satisfaction.     Omar Mcnair MD, PhD   of Medicine   Oncology/BMT/Cellular Therapies

## 2024-02-14 DIAGNOSIS — Z76.89 PREVENTION OF CHEMOTHERAPY-INDUCED NEUTROPENIA: Primary | ICD-10-CM

## 2024-02-14 NOTE — PROGRESS NOTES
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        INTERVAL HISTORY:     Taking longer to recover from each chemo cycle.  Usually middle of second week before he starts to recover.  Working with PT. Some leg pain, particularly with extended walking or riding bike.  Neuropathy in feet is present.  Taking gabapentin for this with good effect.  Some burning/shock type feelings, but these are intermittent. Still with discomfort in anterior chest wall/sternum.  No fevers, chills, cough, shortness of breath or night sweats concerning for presence of pulmonary infection.      Review of Systems:  Complete ROS reviewed and otherwise negative except as listed in Interval History.      ONCOLOGY HISTORY: Mr. Walter Reyes is a 63 year old gentleman with a metastatic castration-sensitive prostate cancer and incidentally diagnosed stage 3 clear cell RCC (s/p radical R nephrectomy on 3/19/19) which is now 4.5+ years post-therapy without evidence of recurrence. His oncologic history is detailed below.     1. 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 Naylor at [facility]. Prior PSA were 1.4 on 8/16/17, 2.4 on 6/28/16, 2.9 on 6/28/16, and as low as 0.4 on 3/26/2003.   - 1/08/2019: Consultation with Sarah Wilson CNP in Urology clinic. Repeat PSA 9.6.  - 1/16/2019: MRI prostate with contrast - \"This examination is characterized as PIRADS 5- very high probability. Clinically significant cancer is highly likely to be present. There is a large, invasive mass arising from the right peripheral zone and extending into the neurovascular bundle, seminal vesicle, and along the anterolateral right mesorectal fascia. Metastatic right external iliac lymph node. " "Metastatic lesion in the posterior/superior right acetabulum.\"  - 1/25/2019: CT abdomen and pelvis with IV contrast - \"1. Heterogeneous enhancing mass posteriorly in the upper pole of the right kidney measures 4.5 x 5.8 x 5.7 cm (AP by transverse by craniocaudal). It has a small nodular component extending posterior medially which abuts the right psoas muscle. This nodular extension measures 2.1 x 2.1 cm. Minimal stranding about the mass. No definite thrombus within the right renal vein. This renal mass is compatible with a renal cell carcinoma. A paraaortic lymph node situated immediately posterior to the left renal vein measures 1.1 cm in short axis, suspicious for metastasis. 2. A 2 cm right external iliac lymph node is also suspicious for metastases. 3. Multiple sclerotic osseous lesions suspicious for metastases. These include 0.8 and 0.6 cm sclerotic lesions laterally in the right iliac wing (images 53 and 62 respectively). Ill-defined groundglass density laterally in the right acetabulum measuring 1.7 x 1.2 cm corresponds with the lesion identified on MRI. A 0.4 cm groundglass density in the left acetabulum. Sclerotic lesion in the left femoral neck measures 0.9 x 1.6 cm (image 81). Sclerotic metastases would be more compatible with prostate metastases. 4. A 3 subcentimeter hepatic lesions are indeterminate. Metastases would be a consideration. These can be further characterized with liver MRI.\"  - 1/25/2019: NM bone scan - \"There is focal bony uptake in the left femoral neck, right acetabulum, right of midline at the S1 or L5 level of the spine, within multiple bilateral ribs, in the C7 or T1 level of the spine, and anteriorly within the skull. Findings are suspicious for metastases.\"  - 1/31/19: CT chest with contrast - \" No suspicious nodules in the chest.  Stable appearance of a 5.8 cm right renal mass concerning for renal carcinoma until proven otherwise.  Stable indeterminant subcentimeter hypodensities " "in the liver.  Multifocal osteoblastic metastasis including 2.5 cm lesion in the right fifth rib posteriorly and a 1.6 cm lesion in the right third rib posteriorly. No lytic lesions identified.\"  - 2/6/19: CT-guided right sclerotic fifth rib lesion biopsy - \"Metastatic carcinoma, consistent with prostate primary.  Immunohistochemical stains performed show the metastatic carcinoma stains positive for NKX3.1 (prostate marker), negative for STACIE 3 and PAX8, which supports the above diagnosis.\"  - 2/15/19: Started Casodex 50mg every day and consented for the biobanking protocol. 3/18/19 - stopped Casodex.  - 2/19/19: Case discussed in tumor board - recommendation for nephectomy for suspected malignant right renal mass.   - 2/26/19: Started Lupron 22.5mg every 3 months.   - 5/8/19: Started Docetaxel 75mg/m2 IV every 3 weeks. 06/18/19 - cycle 3, 7/10/19 - cycle 4, 07/31/19 - cycle 5, 08/21/19 - cycle 6.   - 10/2/19: Restaging CT CAP and NM Bone Scan showed improved osseous disease, no evidence of recurrent or new metastatic disease.  - 1/5/20: Restaging CT CAP and NM Bone Scan showed stable osseous disease, no evidence of recurrent or new metastatic disease.  - 4/6/20: NM bone scan with slightly improved uptake in known skeletal mets. CT C/A/P with contrast with stable osseous mets, no yusuf enlargement, no new visceral mets etc.  - 04/08/20: Zometa every 3 months.  - 10/5/20: CT C/A/P with contrast and NM bone scan - SD.   - 1/4/21: CT C/A/P with contrast and NM bone scan - SD but slight increase in right 5th rib tracer uptake and in posterior L5 sclerosis. 1/6/21  PSA = 0.29  - 03/29/21: CT C/A/P with contrast - single new right sacral 8mm bone lesion (suspicious), other bone mets stable. No visceral/yusuf disease. Nephrectomy site without recurrence. NM bone scan - SD but \"increased uptake associated with the prior lesions of the lower  cervical spine, posterior right fourth rib and right L5 posterior arch elements. " "Otherwise unchanged uptake associated with the proximal left femur and left anterior fourth rib. Similar uptake of the left halux, possibly secondary to degenerative osteoarthritis or gout.\"    3/31/21 PSA = 0.44  7/7/21  PSA = 0.47  11/2021 PSA = 1.05  -- Start XTANDI  12/16/21 PSA =0.22  2/11/22 PSA= 0.50  3/22/22 PSA=0.72  5/5/2022 PSA=1.79  7/11/2022 PSA=2.16 --Start cycle 1 docetaxel   8/22/22 PSA = 1.36  9/12/22 PSA = 1.09  10/24/22 Cycle #6 of Docetaxel. End of treatment  1/9/23  PSA =0.66  3/20/23  PSA=1.54  4/21/23 PSA = 1.81  T=15  5/22/23 C1 Pluvicto   06/07/23          PSA = 1.42  7/18/23 PSA=1.75, C2 Pluvicto   8/28/23 Transfer of care initial visit for Tabby.  PSA 2.06.  C3 Pluvicto scheduled for 8/30.  Proceed with dose as planned.  MR L femur, ortho referral, PSMA PET ordered for prior to follow-up.    10/2/23 Palliative RT to L femur metastasis complete.  PSMA PET with mixed response.  PSA 2.21.  RT to L femur pending.  Continue with Dose #4 of Pluvicto despite mixed response to therapy.  Dex course for possible pain flare with RT.    11/8/23  Follow-up prior to Dose #5 Pluvicto.  More pain in chest/ribs/back. PSA 4.38.  Testosterone=3.  Rad onc referral for ribs.  Biopsy progressive lesion for progression on Pluvicto.  Cabazitaxel scheduled for follow-up appointment.  Pending Tempus testing on biopsy sample versus peripheral blood.   11/27/23 Bx completed from R iliac crest but unrevealing for either PCa or RCC.  Start cabazitaxel C1D1.  PSA 2.81.    12/19/23 PSA = 3.66  1/8/24 PSA = 3.55  1/29/24 PSA = 3.07   2/13/24-Restaging bone scan with significant areas of osseous progresison.  Transition to carbo/cabazitaxel with neulasta support.      Radiation history:   -C7         3,000 cGy       06 X      8/05/2021        8/18/2021        13       10  -2 Sacrum          2,500 cGy       18 X      4/12/2022        4/18/2022         6           -Sacrum retreat               700 cGy        18 X      " "2/28/2023 2/28/2023  -Left femur to 2000 cGy in 5 fractions, completed 10/10/2023   -bilateral posterior chest wall at an area of osseous involvement of the ribs and adjacent T4 and 5 spine, to be delivered to 2000 cGy in 5 fractions.  completed 12/13-12/19/23.    2. Stage III ([id number]), grade 3 of 4, clear-cell RCC of right kidney:  - Incidentally diagnosed as above.   - Underwent curative-intent robotic right radical nephrectomy with Dr. Wesley Cleveland on 3/19/19. No tumor spillage per op report.   - Path showed: \"Histologic Type: Clear cell renal cell carcinoma; Sarcomatoid Features: Not identified; Histologic Grade: Nucleolar grade 3 (WHO/ISUP). Extent Tumor Size: 4.3 cm. Microscopic Tumor Extension: Tumor extension into renal sinus (in vascular structures). Margins: Negative. Tumor Necrosis: Present; focal. Lymph-Vascular Invasion: Not identified.  Pathologic Staging (pTNM) Primary Tumor (pT): pT3a: Tumor extends into renal vein branches/renal sinus. Regional Lymph Nodes (pN): pNX. Number of Lymph Nodes Examined: 0 Distant Metastasis (pM): pM N/A.\"  - Restaging scans as above.  No current concerns for recurrence.      PAST MEDICAL HISTORY:  Past Medical History:   Diagnosis Date    Cancer of kidney, right (H)     Complication of anesthesia     slow wakeup     Malignant neoplasm of prostate metastatic to bone (H) 2/14/2019    Thyroid disease      CURRENT MEDICATIONS:   Current Outpatient Medications:     loratadine (CLARITIN) 10 MG tablet, Take 10 mg by mouth daily, Disp: , Rfl:     atorvastatin (LIPITOR) 20 MG tablet, Take 60 mg by mouth daily , Disp: , Rfl:     calcium citrate-vitamin D (CITRACAL) 315-250 MG-UNIT TABS per tablet, Take 650 mg by mouth 2 times daily , Disp: , Rfl:     cycloSPORINE (RESTASIS) 0.05 % ophthalmic emulsion, Place 1 drop into both eyes 2 times daily , Disp: , Rfl:     dexAMETHasone (DECADRON) 4 MG tablet, Take 1 tablet (4 mg) by mouth daily, Disp: 7 tablet, Rfl: 2    fentaNYL " (DURAGESIC) 25 mcg/hr 72 hr patch, Place 1 patch onto the skin every 72 hours remove old patch., Disp: 10 patch, Rfl: 0    gabapentin (NEURONTIN) 300 MG capsule, TAKE ONE CAPSULE BY MOUTH TWICE DAILY AND 2 CAPSULES AT BEDTIME, Disp: 120 capsule, Rfl: 3    levothyroxine (SYNTHROID/LEVOTHROID) 112 MCG tablet, Take 112 mcg by mouth daily, Disp: , Rfl:     lidocaine, viscous, (XYLOCAINE) 2 % solution, Swish and swallow 15 mLs in mouth every 3 hours as needed for pain (before meals and at bedtime) ; Max 8 doses/24 hour period., Disp: 100 mL, Rfl: 1    Multiple Vitamins-Minerals (MULTIVITAMIN ADULT EXTRA C PO), Take 1 tablet by mouth every 24 hours, Disp: , Rfl:     naloxone (NARCAN) 4 MG/0.1ML nasal spray, Spray 1 spray (4 mg) into one nostril alternating nostrils as needed for opioid reversal every 2-3 minutes until assistance arrives, Disp: 0.2 mL, Rfl: 1    Nutritional Supplements (SALMON OIL) CAPS, Take 2 capsules by mouth daily , Disp: , Rfl:     omeprazole (PRILOSEC) 20 MG DR capsule, Take 20 mg by mouth daily, Disp: , Rfl:     ondansetron (ZOFRAN) 8 MG tablet, Take 1 tablet (8 mg) by mouth every 8 hours as needed for nausea, Disp: 30 tablet, Rfl: 4    oxyCODONE (ROXICODONE) 5 MG tablet, Take 1-2 tablets (5-10 mg) by mouth every 3 hours as needed for severe pain, Disp: 90 tablet, Rfl: 0    tamsulosin (FLOMAX) 0.4 MG capsule, Take 1 capsule (0.4 mg) by mouth daily, Disp: 30 capsule, Rfl: 3    triamterene-HCTZ (MAXZIDE-25) 37.5-25 MG tablet, Take 1 tablet by mouth daily (Patient not taking: Reported on 12/19/2023), Disp: , Rfl:       Physical Exam:   /84 (BP Location: Right arm, Patient Position: Sitting, Cuff Size: Adult Large)   Pulse 82   Temp 98.1  F (36.7  C) (Oral)   Resp 16   Wt 121.4 kg (267 lb 11.2 oz)   SpO2 98%   BMI 37.35 kg/m    Wt Readings from Last 10 Encounters:   02/13/24 121.4 kg (267 lb 11.2 oz)   01/29/24 122.7 kg (270 lb 6.4 oz)   01/24/24 117.9 kg (260 lb)   01/08/24 119.6 kg (263 lb  9.6 oz)   12/20/23 117.9 kg (260 lb)   12/19/23 118.4 kg (261 lb)   12/13/23 118 kg (260 lb 1.6 oz)   11/27/23 117.9 kg (259 lb 14.4 oz)   11/15/23 117.9 kg (260 lb)   11/08/23 120.7 kg (266 lb 1.6 oz)   General: The patient is a pleasant male in no acute distress.  HEENT: EOMI. Sclerae are anicteric. Mucous membranes moist without lesions or thrush.   Lymph: Neck is supple, no overt adenopathy.   Heart: Regular rate and rhythm.   Lungs: Normal WOB on RA.   Abdomen: Bowel sounds present, soft, nontender with no palpable hepatosplenomegaly or masses.   Extremities: No lower extremity edema noted bilaterally.   Neuro: Cranial nerves II through XII are grossly intact.  Skin: No rashes, petechiae, or bruising noted on exposed skin.  Psych: affect normal, alert and oriented    LABORATORY DATA:  Most Recent 3 CBC's:  Recent Labs   Lab Test 01/29/24  1152 01/08/24  0945 12/19/23  1000   WBC 4.9 3.4* 3.2*   HGB 10.4* 10.4* 11.2*   MCV 91 91 88    163 171   ANEUTAUTO 3.6 2.2 2.2     Most Recent 3 BMP's:  Recent Labs   Lab Test 01/29/24  1152 01/08/24  0945 12/19/23  1000    142 140   POTASSIUM 4.0 3.9 4.0   CHLORIDE 104 108* 107   CO2 26 25 25   BUN 13.3 11.6 12.8   CR 0.97 0.91 0.88   ANIONGAP 10 9 8   BETHANY 8.9 8.6* 8.6*   * 96 108*   PROTTOTAL 6.2* 6.3* 6.5   ALBUMIN 3.8 3.9 3.9    Most Recent 3 LFT's:  Recent Labs   Lab Test 01/29/24  1152 01/08/24  0945 12/19/23  1000   AST 18 18 19   ALT 16 13 13   ALKPHOS 122 159* 210*   BILITOTAL 0.3 0.4 0.3       I reviewed the above labs today.    PSA trend, see oncology history.      R iliac crest biopsy from 11/21/23 without evidence for malignancy.      ASSESSMENT & PLAN: Mr. Reyes is a delightful 63 year old gentleman with metastatic castration sensitive prostate adenocarcinoma as well as an incidentally diagnosed stage III clear-cell renal cell carcinoma of the right kidney (s/p radical R nephrectomy 3/19/19), who is here for a follow-up visit and  "initiation of docetafor now metastatic castration-resistant prostate cancer.     1. Metastatic castration-resistant prostate cancer, with involvement of the bones and RPLN:   - Patient met the criteria for CHAARTED \"high-volume\" metastatic hormone-sensitive prostate cancer. He opted for docetaxel in combination with ADT (per JOSEFA, GETUG-AFU15, FELIPEEDSEEMA). He was subsequently treated with docetaxel x6 doses in the [facility] setting and enzalutamide.  He initiated Pluvicto in May 2023 with an initial slight decline in PSA, but this began to rise just prior to Cycle 3.  Given his XR evidence of progression in L femur and PSA rise, we re-evaluated his progression with PSMA PET scan in September 2023, with note of mixed response.  Reconsented for  [employer] on 8/28/23.  Due for 3 month collection at end of November 2023.  Canceled Pluvicto Dose for November due to symptomatic progression with PSA rise. PSA is labile. Biopsy results from 11/21 are pending for evaluation of NEPC vs small cell given progression without a significant rise in PSA.  Will also send peripheral blood Tempus for evaluation of targetable mutations given the negative result from the 11/21/23 bone biopsy, but blood Tempus study for targetable mutations was unrevealing.  -Zometa next due 1/24/2024 along with Lupron, will give both 1/29/24.   -Palliative to manage further opiate refills. Pain is well controlled with fentanyl patch.   -Dexamethasone burst for RT and one on hand if needed for pain flares.      -Cabazitaxel started 11/27/23 given progression on Pluvicto. 5 cycles of cabazitaxel with progression noted on 2/12/24 NM bone scan.    -Transition to carboplatin/cabazitaxel with neulasta support.  To start on 2/19.      2. Pressure in chest:  Has an ongoing sensation of pressure/need to cough in his chest, primarily against his sternum. Vital signs stable, negative pulmonary physical exam. No shortness of breath or chest pain. This is most likely " due to progressive bone metastases noted on NM bone scan from 2/12/24.      3. Bone metastases:    - Noted to have osseous metastatic disease at the time of diagnosis. S/p radiation to C spine and sacrum (Re-irradiation to sacrum).   - worsening radiculopathy down starting in the low back and radiating down the left leg in June. MRI read was without new/acute abnormalities  - Pain continues to fluctuate but overall well controlled/managable   - pain mgmt per palliative care, pain is now well controlled with fentanyl patch.   - Encouraged to continue calcium and vitamin D supplementation; continue Zometa 4mg IV every ~3 months. Due next in January 2024, will give today, 1/29/24..   - RT to L femur 10/2023.  RT for bilateral posterior ribs 12/2023 with Dr. Albert.  - Added to trial list for [id number].  He prefers to wait for DTI referral for consideration of JORGE ALBERTO-280.       4. Stage 3, UISS-high risk ccRCC: s/p R nephrectomy   - Nephrectomy 3/19/19 and noted incidentally.  He is now 4.5 years post-op without evidence for recurrence.   - At this point, there is a minimal risk of recurrence of his RCC given the time since surgery without the use of adjuvant immunotherapy. There is no suspicious adenopathy or other features on his most recent imaging studies.    - Will continue to monitor with imaging planned for prostate cancer.      PLAN:   -Add carboplatin to cabazitaxel starting 2/19.  Neulasta support.  Finance approval requested 2/13.   -Fungal/infectious workup added to 2/19 labs.    -Return with Tabby 3 weeks after chemotherapy for cycle 2.    -Hold on DTI referral for now for JORGE ALBERTO-280 wait list.      A total of 65 minutes were spent on this patient on the day of the encounter, of which more than 50% of this time was used for counseling and coordination of care.  The patient was given the opportunity to ask multiple questions today, all of which were answered to their satisfaction.     Omar Mcnair MD,  PhD   of Medicine   Oncology/BMT/Cellular Therapies

## 2024-02-14 NOTE — PATIENT INSTRUCTIONS
Walter,   I've sent the request to approve the addition of carboplatin to your regimen for next week.  We will also add a growth factor shot to prevent you from getting low blood counts and increased infection risk.  This is also pending financial approval.  I'll see you myself 3 weeks after that dose to check in before the next cycle of carboplatin/cabazitaxel.      Below is information for the carboplatin and growth factor.

## 2024-02-15 ENCOUNTER — MYC MEDICAL ADVICE (OUTPATIENT)
Dept: ONCOLOGY | Facility: CLINIC | Age: 64
End: 2024-02-15

## 2024-02-15 NOTE — TELEPHONE ENCOUNTER
[facility]: Cancer Care Plan of Care Education Note                                    Discussion with Patient:                                                      RNCC reached out to patient to discuss potential side effects. Reviewed side effects and answered patient's questions.  Encouraged him to call back in case of any further questions or concerns.        Goals          General    Other     Notes - Note created  11/27/2023  1:15 PM by Adilene Restrepo, KATHY    Goal Statement: I will use my clinic and care team resources as directed.  Date Goal set: 11/27/2023  Barriers: disease burden  Strengths: support, coping, motivation, health awareness, and involvement with care team  Date to Achieve By: ongoing  Patient expressed understanding of goal: Yes  Action steps to achieve this goal:  I will contact triage with new, worsening or uncontrolled symptoms.   I will contact triage with temperature over 100.4  I will call with difficulties of scheduling and/or transportation.   I will request needed refills when there are 7 days of medication remaining.   I will not send urgent or symptomatic messages through [employer].   I will contact scheduling to arrange or make changes in my appointments.                Assessment:                                                       Provided patient with Cabaxitaxel via oncology handout via [employer].  Patient acknowledged he received and read it.    Intervention/Education provided during outreach:                                                     Patient to follow up as scheduled at next appt.      Mariaa Chavarria, RN, BSN  Oncology RN Care Coordinator  [facility]  [phone]

## 2024-02-16 RX ORDER — ONDANSETRON 8 MG/1
8 TABLET, FILM COATED ORAL EVERY 8 HOURS PRN
Qty: 30 TABLET | Refills: 2 | Status: SHIPPED | OUTPATIENT
Start: 2024-02-18

## 2024-02-16 RX ORDER — DEXAMETHASONE 4 MG/1
8 TABLET ORAL DAILY
Qty: 6 TABLET | Refills: 2 | Status: SHIPPED | OUTPATIENT
Start: 2024-02-18

## 2024-02-16 RX ORDER — PROCHLORPERAZINE MALEATE 10 MG
10 TABLET ORAL EVERY 6 HOURS PRN
Qty: 30 TABLET | Refills: 2 | Status: SHIPPED | OUTPATIENT
Start: 2024-02-18 | End: 2024-06-24

## 2024-02-19 ENCOUNTER — INFUSION THERAPY VISIT (OUTPATIENT)
Dept: ONCOLOGY | Facility: CLINIC | Age: 64
End: 2024-02-19
Attending: STUDENT IN AN ORGANIZED HEALTH CARE EDUCATION/TRAINING PROGRAM
Payer: COMMERCIAL

## 2024-02-19 ENCOUNTER — APPOINTMENT (OUTPATIENT)
Dept: LAB | Facility: CLINIC | Age: 64
End: 2024-02-19
Attending: STUDENT IN AN ORGANIZED HEALTH CARE EDUCATION/TRAINING PROGRAM
Payer: COMMERCIAL

## 2024-02-19 VITALS
TEMPERATURE: 97.9 F | BODY MASS INDEX: 36.21 KG/M2 | SYSTOLIC BLOOD PRESSURE: 116 MMHG | HEART RATE: 73 BPM | RESPIRATION RATE: 16 BRPM | WEIGHT: 267.3 LBS | OXYGEN SATURATION: 97 % | DIASTOLIC BLOOD PRESSURE: 63 MMHG | HEIGHT: 72 IN

## 2024-02-19 DIAGNOSIS — R91.8 PULMONARY NODULES: ICD-10-CM

## 2024-02-19 DIAGNOSIS — C79.51 MALIGNANT NEOPLASM OF PROSTATE METASTATIC TO BONE (H): Primary | ICD-10-CM

## 2024-02-19 DIAGNOSIS — Z76.89 PREVENTION OF CHEMOTHERAPY-INDUCED NEUTROPENIA: ICD-10-CM

## 2024-02-19 DIAGNOSIS — C61 MALIGNANT NEOPLASM OF PROSTATE METASTATIC TO BONE (H): Primary | ICD-10-CM

## 2024-02-19 LAB
ALBUMIN SERPL BCG-MCNC: 3.9 G/DL (ref 3.5–5.2)
ALP SERPL-CCNC: 87 U/L (ref 40–150)
ALT SERPL W P-5'-P-CCNC: 15 U/L (ref 0–70)
ANION GAP SERPL CALCULATED.3IONS-SCNC: 11 MMOL/L (ref 7–15)
AST SERPL W P-5'-P-CCNC: 16 U/L (ref 0–45)
BASOPHILS # BLD AUTO: 0 10E3/UL (ref 0–0.2)
BASOPHILS NFR BLD AUTO: 1 %
BILIRUB SERPL-MCNC: 0.3 MG/DL
BUN SERPL-MCNC: 13.5 MG/DL (ref 8–23)
CALCIUM SERPL-MCNC: 8.6 MG/DL (ref 8.8–10.2)
CHLORIDE SERPL-SCNC: 108 MMOL/L (ref 98–107)
CREAT SERPL-MCNC: 1.05 MG/DL (ref 0.67–1.17)
CRP SERPL-MCNC: <3 MG/L
DEPRECATED HCO3 PLAS-SCNC: 24 MMOL/L (ref 22–29)
EGFRCR SERPLBLD CKD-EPI 2021: 81 ML/MIN/1.73M2
EOSINOPHIL # BLD AUTO: 0 10E3/UL (ref 0–0.7)
EOSINOPHIL NFR BLD AUTO: 1 %
ERYTHROCYTE [DISTWIDTH] IN BLOOD BY AUTOMATED COUNT: 17.9 % (ref 10–15)
GLUCOSE SERPL-MCNC: 101 MG/DL (ref 70–99)
HCT VFR BLD AUTO: 32.8 % (ref 40–53)
HGB BLD-MCNC: 10.5 G/DL (ref 13.3–17.7)
IMM GRANULOCYTES # BLD: 0 10E3/UL
IMM GRANULOCYTES NFR BLD: 1 %
LYMPHOCYTES # BLD AUTO: 0.7 10E3/UL (ref 0.8–5.3)
LYMPHOCYTES NFR BLD AUTO: 18 %
MCH RBC QN AUTO: 29.8 PG (ref 26.5–33)
MCHC RBC AUTO-ENTMCNC: 32 G/DL (ref 31.5–36.5)
MCV RBC AUTO: 93 FL (ref 78–100)
MONOCYTES # BLD AUTO: 0.6 10E3/UL (ref 0–1.3)
MONOCYTES NFR BLD AUTO: 14 %
NEUTROPHILS # BLD AUTO: 2.6 10E3/UL (ref 1.6–8.3)
NEUTROPHILS NFR BLD AUTO: 65 %
NRBC # BLD AUTO: 0 10E3/UL
NRBC BLD AUTO-RTO: 0 /100
PLATELET # BLD AUTO: 212 10E3/UL (ref 150–450)
POTASSIUM SERPL-SCNC: 3.7 MMOL/L (ref 3.4–5.3)
PROCALCITONIN SERPL IA-MCNC: 0.04 NG/ML
PROT SERPL-MCNC: 6.2 G/DL (ref 6.4–8.3)
RBC # BLD AUTO: 3.52 10E6/UL (ref 4.4–5.9)
SODIUM SERPL-SCNC: 143 MMOL/L (ref 135–145)
WBC # BLD AUTO: 4 10E3/UL (ref 4–11)

## 2024-02-19 PROCEDURE — 84145 PROCALCITONIN (PCT): CPT

## 2024-02-19 PROCEDURE — 85025 COMPLETE CBC W/AUTO DIFF WBC: CPT | Performed by: STUDENT IN AN ORGANIZED HEALTH CARE EDUCATION/TRAINING PROGRAM

## 2024-02-19 PROCEDURE — 96367 TX/PROPH/DG ADDL SEQ IV INF: CPT

## 2024-02-19 PROCEDURE — 87385 HISTOPLASMA CAPSUL AG IA: CPT

## 2024-02-19 PROCEDURE — 96377 APPLICATON ON-BODY INJECTOR: CPT | Mod: 59

## 2024-02-19 PROCEDURE — 96372 THER/PROPH/DIAG INJ SC/IM: CPT | Performed by: STUDENT IN AN ORGANIZED HEALTH CARE EDUCATION/TRAINING PROGRAM

## 2024-02-19 PROCEDURE — 87305 ASPERGILLUS AG IA: CPT

## 2024-02-19 PROCEDURE — 96413 CHEMO IV INFUSION 1 HR: CPT

## 2024-02-19 PROCEDURE — 96417 CHEMO IV INFUS EACH ADDL SEQ: CPT

## 2024-02-19 PROCEDURE — 36591 DRAW BLOOD OFF VENOUS DEVICE: CPT

## 2024-02-19 PROCEDURE — 96375 TX/PRO/DX INJ NEW DRUG ADDON: CPT

## 2024-02-19 PROCEDURE — 250N000011 HC RX IP 250 OP 636: Performed by: STUDENT IN AN ORGANIZED HEALTH CARE EDUCATION/TRAINING PROGRAM

## 2024-02-19 PROCEDURE — 258N000003 HC RX IP 258 OP 636: Performed by: STUDENT IN AN ORGANIZED HEALTH CARE EDUCATION/TRAINING PROGRAM

## 2024-02-19 PROCEDURE — 86140 C-REACTIVE PROTEIN: CPT

## 2024-02-19 PROCEDURE — 84153 ASSAY OF PSA TOTAL: CPT

## 2024-02-19 PROCEDURE — 87449 NOS EACH ORGANISM AG IA: CPT

## 2024-02-19 PROCEDURE — 82040 ASSAY OF SERUM ALBUMIN: CPT | Performed by: STUDENT IN AN ORGANIZED HEALTH CARE EDUCATION/TRAINING PROGRAM

## 2024-02-19 RX ORDER — HEPARIN SODIUM (PORCINE) LOCK FLUSH IV SOLN 100 UNIT/ML 100 UNIT/ML
5 SOLUTION INTRAVENOUS ONCE
Status: COMPLETED | OUTPATIENT
Start: 2024-02-19 | End: 2024-02-19

## 2024-02-19 RX ORDER — PREDNISONE 5 MG/1
5 TABLET ORAL 2 TIMES DAILY WITH MEALS
Qty: 42 TABLET | Refills: 0 | Status: SHIPPED | OUTPATIENT
Start: 2024-02-19 | End: 2024-03-11

## 2024-02-19 RX ORDER — HEPARIN SODIUM (PORCINE) LOCK FLUSH IV SOLN 100 UNIT/ML 100 UNIT/ML
5 SOLUTION INTRAVENOUS
Status: DISCONTINUED | OUTPATIENT
Start: 2024-02-19 | End: 2024-02-19 | Stop reason: HOSPADM

## 2024-02-19 RX ORDER — PALONOSETRON 0.05 MG/ML
0.25 INJECTION, SOLUTION INTRAVENOUS ONCE
Status: COMPLETED | OUTPATIENT
Start: 2024-02-19 | End: 2024-02-19

## 2024-02-19 RX ADMIN — PEGFILGRASTIM 6 MG: KIT SUBCUTANEOUS at 14:50

## 2024-02-19 RX ADMIN — FAMOTIDINE 20 MG: 10 INJECTION INTRAVENOUS at 12:33

## 2024-02-19 RX ADMIN — SODIUM CHLORIDE 250 ML: 9 INJECTION, SOLUTION INTRAVENOUS at 12:30

## 2024-02-19 RX ADMIN — DIPHENHYDRAMINE HYDROCHLORIDE 25 MG: 50 INJECTION, SOLUTION INTRAMUSCULAR; INTRAVENOUS at 12:36

## 2024-02-19 RX ADMIN — Medication 5 ML: at 11:04

## 2024-02-19 RX ADMIN — Medication 5 ML: at 15:22

## 2024-02-19 RX ADMIN — FOSAPREPITANT: 150 INJECTION, POWDER, LYOPHILIZED, FOR SOLUTION INTRAVENOUS at 12:52

## 2024-02-19 RX ADMIN — PALONOSETRON HYDROCHLORIDE 0.25 MG: 0.25 INJECTION INTRAVENOUS at 12:31

## 2024-02-19 RX ADMIN — CARBOPLATIN 500 MG: 10 INJECTION INTRAVENOUS at 14:49

## 2024-02-19 RX ADMIN — SODIUM CHLORIDE 49 MG: 9 INJECTION, SOLUTION INTRAVENOUS at 13:35

## 2024-02-19 ASSESSMENT — PAIN SCALES - GENERAL: PAINLEVEL: NO PAIN (0)

## 2024-02-19 NOTE — PATIENT INSTRUCTIONS
You received an anti-nausea medication called Aloxi today. Aloxi is in the same drug class as one of your take home as needed anti-nausea medications called Ondanestron or Zofran. Do not take your Zofran prescription for the next three days because you are covered by the Aloxi you received today. You can begin to take your Zofran prescription as needed starting on Thursday, February 22nd. If you need coverage for your nausea before then, feel free to use your compazine prescription.     [facility] Triage and after hours / weekends / holidays:  [phone]    Please call the triage or after hours line if you experience a temperature greater than or equal to 100.4, shaking chills, have uncontrolled nausea, vomiting and/or diarrhea, dizziness, shortness of breath, chest pain, bleeding, unexplained bruising, or if you have any other new/concerning symptoms, questions or concerns.      If you are having any concerning symptoms or wish to speak to a provider before your next infusion visit, please call triage to notify them so we can adequately serve you.     If you need a refill on a narcotic prescription or other medication, please call before your infusion appointment.

## 2024-02-19 NOTE — PROGRESS NOTES
Infusion Nursing Note:  Walter Reyes presents today for Cycle 1 Day 1 Cabazitaxel and Carboplatin.    Patient seen by provider today: No   present during visit today: Not Applicable.    Note: Patient denies any signs or symptoms of infection. Patient states he experiences intermittent lightheadedness and dizziness at home but declines any falls. Patient also reports some slight swelling in his left foot. Patient offers no additional complaints or concerns. Patient agreeable to treatment plan today.    Patient declines any questions or concerns regarding the addition of Carboplatin. Patient declined the need for any paperwork about Carboplatin printed.    Intravenous Access:  Implanted Port.    Treatment Conditions:  Lab Results   Component Value Date    HGB 10.5 (L) 02/19/2024    WBC 4.0 02/19/2024    ANEU 5.0 07/07/2021    ANEUTAUTO 2.6 02/19/2024     02/19/2024        Lab Results   Component Value Date     02/19/2024    POTASSIUM 3.7 02/19/2024    MAG 1.9 08/25/2023    CR 1.05 02/19/2024    BETHANY 8.6 (L) 02/19/2024    BILITOTAL 0.3 02/19/2024    ALBUMIN 3.9 02/19/2024    ALT 15 02/19/2024    AST 16 02/19/2024     Results reviewed, labs MET treatment parameters, ok to proceed with treatment.    Post Infusion Assessment:  Patient tolerated infusion without incident.  Blood return noted pre and post infusion.  Site patent and intact, free from redness, edema or discomfort.  No evidence of extravasations.  Access discontinued per protocol.     Neulasta On Pro- On Body injector applied to patient today on the left lower abdomen at 3pm with light facing up. Writer discussed Neulasta injection would start tomorrow at 6pm approximately 27 hours after application applied today.  Written and Verbal instruction reviewed with patient.  Pt instructed when the dose delivery starts, it will take about 45 minutes to complete. Pt aware Neulasta Onpro On-Body should have green flashing light and to  call triage or on-call MD if injector flashes red or appears to be leaking. Pt aware to keep Onpro On-Body Neualsta 4 inches away from electrical equipment and to avoid showering 4 hours prior to injection.     Discharge Plan:   Prescription refills given for Prednisone. Patient already picked up Zofran, Compazine, and Dexamethasone downstairs. Patient offers no additional questions regarding these medications.  Discharge instructions reviewed with: Patient and Family.  Patient and/or family verbalized understanding of discharge instructions and all questions answered.  AVS to patient via [employer].  Patient will return 03/11/24 for next appointment.   Patient discharged in stable condition accompanied by: self and wife.  Departure Mode: Ambulatory.      Heather Fatima RN

## 2024-02-20 LAB
1,3 BETA GLUCAN SER-MCNC: <31 PG/ML
OBSERVATION IMP: NEGATIVE

## 2024-02-21 LAB
GALACTOMANNAN AG SERPL QL IA: NEGATIVE
GALACTOMANNAN AG SPEC IA-ACNC: 0.17

## 2024-02-22 DIAGNOSIS — C79.51 MALIGNANT NEOPLASM OF PROSTATE METASTATIC TO BONE (H): Primary | ICD-10-CM

## 2024-02-22 DIAGNOSIS — C61 MALIGNANT NEOPLASM OF PROSTATE METASTATIC TO BONE (H): Primary | ICD-10-CM

## 2024-02-22 LAB
PSA SERPL DL<=0.01 NG/ML-MCNC: 2.21 NG/ML (ref 0–4.5)
SCANNED LAB RESULT: NORMAL

## 2024-03-01 ENCOUNTER — THERAPY VISIT (OUTPATIENT)
Dept: PHYSICAL THERAPY | Facility: CLINIC | Age: 64
End: 2024-03-01
Payer: COMMERCIAL

## 2024-03-01 DIAGNOSIS — C79.51 MALIGNANT NEOPLASM OF PROSTATE METASTATIC TO BONE (H): Primary | ICD-10-CM

## 2024-03-01 DIAGNOSIS — C61 MALIGNANT NEOPLASM OF PROSTATE METASTATIC TO BONE (H): Primary | ICD-10-CM

## 2024-03-01 PROCEDURE — 97110 THERAPEUTIC EXERCISES: CPT | Mod: GP | Performed by: PHYSICAL THERAPIST

## 2024-03-01 PROCEDURE — 97112 NEUROMUSCULAR REEDUCATION: CPT | Mod: GP | Performed by: PHYSICAL THERAPIST

## 2024-03-06 DIAGNOSIS — C61 PROSTATE CANCER (H): ICD-10-CM

## 2024-03-06 DIAGNOSIS — G89.3 CANCER ASSOCIATED PAIN: ICD-10-CM

## 2024-03-06 RX ORDER — FENTANYL 25 UG/1
1 PATCH TRANSDERMAL
Qty: 10 PATCH | Refills: 0 | Status: SHIPPED | OUTPATIENT
Start: 2024-03-06 | End: 2024-04-03

## 2024-03-06 NOTE — TELEPHONE ENCOUNTER
Received [employer] message from patient requesting refill of fentanyl.     Last refill: 1/30/24  Last office visit: 1/24/24  Scheduled for follow up pending, msg sent to schedulers.     Will route request to NP for review.     Reviewed [place]  Report.

## 2024-03-10 NOTE — PROGRESS NOTES
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      INTERVAL HISTORY: Hermann really good weeks 2-3 of cycle 1.  Energy was really good for once.  Happy to see that he will be receiving Neulasta again.  No fevers or chills.  Appetite is good.  No new pains.  Pain control is good with current regimen.  Hoping to avoid Lupron/zometa infusions at same time as regular chemo appts.        Review of Systems:  Remainder of complete ROS reviewed and negative except as noted in interval history.     ONCOLOGY HISTORY: Mr. 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 His oncologic history is detailed below.     1. 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 Naylor at [facility]. Prior PSA were 1.4 on 8/16/17, 2.4 on 6/28/16, 2.9 on 6/28/16, and as low as 0.4 on 3/26/2003.   - 1/08/2019: Consultation with Sarah Wilson CNP in Urology clinic. Repeat PSA 9.6.  - 1/16/2019: MRI prostate with contrast - \"This examination is characterized as PIRADS 5- very high probability. Clinically significant cancer is highly likely to be present. There is a large, invasive mass arising from the right peripheral zone and extending into the neurovascular bundle, seminal vesicle, and along the anterolateral right mesorectal fascia. Metastatic right external iliac lymph node. Metastatic lesion in the posterior/superior right acetabulum.\"  - 1/25/2019: CT abdomen and pelvis with IV contrast - \"1. Heterogeneous enhancing mass posteriorly in the upper pole of the right kidney " "measures 4.5 x 5.8 x 5.7 cm (AP by transverse by craniocaudal). It has a small nodular component extending posterior medially which abuts the right psoas muscle. This nodular extension measures 2.1 x 2.1 cm. Minimal stranding about the mass. No definite thrombus within the right renal vein. This renal mass is compatible with a renal cell carcinoma. A paraaortic lymph node situated immediately posterior to the left renal vein measures 1.1 cm in short axis, suspicious for metastasis. 2. A 2 cm right external iliac lymph node is also suspicious for metastases. 3. Multiple sclerotic osseous lesions suspicious for metastases. These include 0.8 and 0.6 cm sclerotic lesions laterally in the right iliac wing (images 53 and 62 respectively). Ill-defined groundglass density laterally in the right acetabulum measuring 1.7 x 1.2 cm corresponds with the lesion identified on MRI. A 0.4 cm groundglass density in the left acetabulum. Sclerotic lesion in the left femoral neck measures 0.9 x 1.6 cm (image 81). Sclerotic metastases would be more compatible with prostate metastases. 4. A 3 subcentimeter hepatic lesions are indeterminate. Metastases would be a consideration. These can be further characterized with liver MRI.\"  - 1/25/2019: NM bone scan - \"There is focal bony uptake in the left femoral neck, right acetabulum, right of midline at the S1 or L5 level of the spine, within multiple bilateral ribs, in the C7 or T1 level of the spine, and anteriorly within the skull. Findings are suspicious for metastases.\"  - 1/31/19: CT chest with contrast - \" No suspicious nodules in the chest.  Stable appearance of a 5.8 cm right renal mass concerning for renal carcinoma until proven otherwise.  Stable indeterminant subcentimeter hypodensities in the liver.  Multifocal osteoblastic metastasis including 2.5 cm lesion in the right fifth rib posteriorly and a 1.6 cm lesion in the right third rib posteriorly. No lytic lesions identified.\"  - " "2/6/19: CT-guided right sclerotic fifth rib lesion biopsy - \"Metastatic carcinoma, consistent with prostate primary.  Immunohistochemical stains performed show the metastatic carcinoma stains positive for NKX3.1 (prostate marker), negative for STACIE 3 and PAX8, which supports the above diagnosis.\"  - 2/15/19: Started Casodex 50mg every day and consented for the biobanking protocol. 3/18/19 - stopped Casodex.  - 2/19/19: Case discussed in tumor board - recommendation for nephectomy for suspected malignant right renal mass.   - 2/26/19: Started Lupron 22.5mg every 3 months.   - 5/8/19: Started Docetaxel 75mg/m2 IV every 3 weeks. 06/18/19 - cycle 3, 7/10/19 - cycle 4, 07/31/19 - cycle 5, 08/21/19 - cycle 6.   - 10/2/19: Restaging CT CAP and NM Bone Scan showed improved osseous disease, no evidence of recurrent or new metastatic disease.  - 1/5/20: Restaging CT CAP and NM Bone Scan showed stable osseous disease, no evidence of recurrent or new metastatic disease.  - 4/6/20: NM bone scan with slightly improved uptake in known skeletal mets. CT C/A/P with contrast with stable osseous mets, no yusuf enlargement, no new visceral mets etc.  - 04/08/20: Zometa every 3 months.  - 10/5/20: CT C/A/P with contrast and NM bone scan - SD.   - 1/4/21: CT C/A/P with contrast and NM bone scan - SD but slight increase in right 5th rib tracer uptake and in posterior L5 sclerosis. 1/6/21  PSA = 0.29  - 03/29/21: CT C/A/P with contrast - single new right sacral 8mm bone lesion (suspicious), other bone mets stable. No visceral/yusuf disease. Nephrectomy site without recurrence. NM bone scan - SD but \"increased uptake associated with the prior lesions of the lower  cervical spine, posterior right fourth rib and right L5 posterior arch elements. Otherwise unchanged uptake associated with the proximal left femur and left anterior fourth rib. Similar uptake of the left halux, possibly secondary to degenerative osteoarthritis or " "gout.\"    3/31/21 PSA = 0.44  7/7/21  PSA = 0.47  11/2021 PSA = 1.05  -- Start XTANDI  12/16/21 PSA =0.22  2/11/22 PSA= 0.50  3/22/22 PSA=0.72  5/5/2022 PSA=1.79  7/11/2022 PSA=2.16 --Start cycle 1 docetaxel   8/22/22 PSA = 1.36  9/12/22 PSA = 1.09  10/24/22 Cycle #6 of Docetaxel. End of treatment  1/9/23  PSA =0.66  3/20/23  PSA=1.54  4/21/23 PSA = 1.81  T=15  5/22/23 C1 Pluvicto   06/07/23          PSA = 1.42  7/18/23 PSA=1.75, C2 Pluvicto   8/28/23 Transfer of care initial visit for Tabby.  PSA 2.06.  C3 Pluvicto scheduled for 8/30.  Proceed with dose as planned.  MR L femur, ortho referral, PSMA PET ordered for prior to follow-up.    10/2/23 Palliative RT to L femur metastasis complete.  PSMA PET with mixed response.  PSA 2.21.  RT to L femur pending.  Continue with Dose #4 of Pluvicto despite mixed response to therapy.  Dex course for possible pain flare with RT.    11/8/23  Follow-up prior to Dose #5 Pluvicto.  More pain in chest/ribs/back. PSA 4.38.  Testosterone=3.  Rad onc referral for ribs.  Biopsy progressive lesion for progression on Pluvicto.  Cabazitaxel scheduled for follow-up appointment.  Tempus blood testing unrevealing for targetable mutation.    11/27/23 Bx completed from R iliac crest but unrevealing for either PCa or RCC.  Start cabazitaxel C1D1.  PSA 2.81.    12/19/23 PSA = 3.66  1/8/24 PSA = 3.55  1/29/24 PSA = 3.07  2/19/24 PSA= 2.21-Start carboplatin/cabazitaxel.    3/11/24 PSA= C2 Carbo/cabazi.  PSA 1.99.        Radiation history:   -C7         3,000 cGy       06 X      8/05/2021        8/18/2021        13       10  -2 Sacrum          2,500 cGy       18 X      4/12/2022        4/18/2022         6           -Sacrum retreat               700 cGy        18 X      2/28/2023        2/28/2023  -Left femur to 2000 cGy in 5 fractions, completed 10/10/2023   -bilateral posterior chest wall at an area of osseous involvement of the ribs and adjacent T4 and 5 spine, to be delivered to 2000 cGy in 5 " "fractions.  completed 12/13-12/19/23.      2. Stage III ([id number]), grade 3 of 4, clear-cell RCC of right kidney:  - Incidentally diagnosed as above.   - Underwent curative-intent robotic right radical nephrectomy with Dr. Wesley Cleveland on 3/19/19. No tumor spillage per op report.   - Path showed: \"Histologic Type: Clear cell renal cell carcinoma; Sarcomatoid Features: Not identified; Histologic Grade: Nucleolar grade 3 (WHO/ISUP). Extent Tumor Size: 4.3 cm. Microscopic Tumor Extension: Tumor extension into renal sinus (in vascular structures). Margins: Negative. Tumor Necrosis: Present; focal. Lymph-Vascular Invasion: Not identified.  Pathologic Staging (pTNM) Primary Tumor (pT): pT3a: Tumor extends into renal vein branches/renal sinus. Regional Lymph Nodes (pN): pNX. Number of Lymph Nodes Examined: 0 Distant Metastasis (pM): pM N/A.\"  - Restaging scans as above.  No current concerns for recurrence.      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 Take with food., Disp: 6 tablet, Rfl: 2    dexAMETHasone (DECADRON) 4 MG tablet, Take 1 tablet (4 mg) by mouth daily, Disp: 7 tablet, Rfl: 2    fentaNYL (DURAGESIC) 25 mcg/hr 72 hr patch, Place 1 patch onto the skin every 72 hours remove old patch., Disp: 10 patch, Rfl: 0    gabapentin " (NEURONTIN) 300 MG capsule, TAKE ONE CAPSULE BY MOUTH TWICE DAILY AND 2 CAPSULES AT BEDTIME, Disp: 120 capsule, Rfl: 3    levothyroxine (SYNTHROID/LEVOTHROID) 112 MCG tablet, Take 112 mcg by mouth daily, Disp: , Rfl:     lidocaine, viscous, (XYLOCAINE) 2 % solution, Swish and swallow 15 mLs in mouth every 3 hours as needed for pain (before meals and at bedtime) ; Max 8 doses/24 hour period., Disp: 100 mL, Rfl: 1    loratadine (CLARITIN) 10 MG tablet, Take 10 mg by mouth daily, Disp: , Rfl:     Multiple Vitamins-Minerals (MULTIVITAMIN ADULT EXTRA C PO), Take 1 tablet by mouth every 24 hours, Disp: , Rfl:     naloxone (NARCAN) 4 MG/0.1ML nasal spray, Spray 1 spray (4 mg) into one nostril alternating nostrils as needed for opioid reversal every 2-3 minutes until assistance arrives, Disp: 0.2 mL, Rfl: 1    Nutritional Supplements (SALMON OIL) CAPS, Take 2 capsules by mouth daily , Disp: , Rfl:     omeprazole (PRILOSEC) 20 MG DR capsule, Take 20 mg by mouth daily, Disp: , Rfl:     ondansetron (ZOFRAN) 8 MG tablet, Take 1 tablet (8 mg) by mouth every 8 hours as needed for nausea (vomiting), Disp: 30 tablet, Rfl: 2    ondansetron (ZOFRAN) 8 MG tablet, Take 1 tablet (8 mg) by mouth every 8 hours as needed for nausea, Disp: 30 tablet, Rfl: 4    oxyCODONE (ROXICODONE) 5 MG tablet, Take 1-2 tablets (5-10 mg) by mouth every 3 hours as needed for severe pain, Disp: 90 tablet, Rfl: 0    predniSONE (DELTASONE) 5 MG tablet, Take 1 tablet (5 mg) by mouth 2 times daily (with meals) for 21 days, Disp: 42 tablet, Rfl: 0    prochlorperazine (COMPAZINE) 10 MG tablet, Take 1 tablet (10 mg) by mouth every 6 hours as needed for nausea or vomiting, Disp: 30 tablet, Rfl: 2    tamsulosin (FLOMAX) 0.4 MG capsule, Take 1 capsule (0.4 mg) by mouth daily, Disp: 30 capsule, Rfl: 3    triamterene-HCTZ (MAXZIDE-25) 37.5-25 MG tablet, Take 1 tablet by mouth daily (Patient not taking: Reported on 12/19/2023), Disp: , Rfl:       Physical Exam:   There  were no vitals taken for this visit.  Wt Readings from Last 10 Encounters:   02/19/24 121.2 kg (267 lb 4.8 oz)   02/13/24 121.4 kg (267 lb 11.2 oz)   01/29/24 122.7 kg (270 lb 6.4 oz)   01/24/24 117.9 kg (260 lb)   01/08/24 119.6 kg (263 lb 9.6 oz)   12/20/23 117.9 kg (260 lb)   12/19/23 118.4 kg (261 lb)   12/13/23 118 kg (260 lb 1.6 oz)   11/27/23 117.9 kg (259 lb 14.4 oz)   11/15/23 117.9 kg (260 lb)   General: The patient is a pleasant male in no acute distress.  HEENT: EOMI. Sclerae are anicteric. Mucous membranes moist without lesions or thrush.   Lymph: Neck is supple with no lymphadenopathy in the cervical or supraclavicular areas.   Heart: Regular rate and rhythm.   Lungs: Clear to auscultation bilaterally.   Abdomen: Bowel sounds present, soft, nontender with no palpable hepatosplenomegaly or masses.   Extremities: No lower extremity edema noted bilaterally.   Neuro: Cranial nerves II through XII are grossly intact.  Skin: No rashes, petechiae, or bruising noted on exposed skin.  Psych: affect bright, alert and oriented    LABORATORY DATA:  Most Recent 3 CBC's:  Recent Labs   Lab Test 02/19/24  1103 01/29/24  1152 01/08/24  0945   WBC 4.0 4.9 3.4*   HGB 10.5* 10.4* 10.4*   MCV 93 91 91    216 163   ANEUTAUTO 2.6 3.6 2.2     Most Recent 3 BMP's:  Recent Labs   Lab Test 02/19/24  1103 01/29/24  1152 01/08/24  0945    140 142   POTASSIUM 3.7 4.0 3.9   CHLORIDE 108* 104 108*   CO2 24 26 25   BUN 13.5 13.3 11.6   CR 1.05 0.97 0.91   ANIONGAP 11 10 9   BETHANY 8.6* 8.9 8.6*   * 100* 96   PROTTOTAL 6.2* 6.2* 6.3*   ALBUMIN 3.9 3.8 3.9    Most Recent 3 LFT's:  Recent Labs   Lab Test 02/19/24  1103 01/29/24  1152 01/08/24  0945   AST 16 18 18   ALT 15 16 13   ALKPHOS 87 122 159*   BILITOTAL 0.3 0.3 0.4     I reviewed the above labs today.    PSA trend, see oncology history.      ASSESSMENT & PLAN: Mr. Reyes is a 64 year old gentleman with metastatic castration sensitive prostate  "adenocarcinoma as well as an incidentally diagnosed stage III clear-cell renal cell carcinoma of the right kidney (s/p radical R nephrectomy 3/19/19), who is here for a follow-up visit and initiation of docetafor now metastatic castration-resistant prostate cancer.     1. Metastatic castration-resistant prostate cancer, with involvement of the bones and RPLN:   - Patient met the criteria for CHAARTED \"high-volume\" metastatic hormone-sensitive prostate cancer. He opted for docetaxel in combination with ADT (per JOSEFA, [id number], FELIPEEDE). He was subsequently treated with docetaxel x6 doses in the [facility] setting and enzalutamide.  He initiated Pluvicto in May 2023 with an initial slight decline in PSA, but this began to rise just prior to Cycle 3.  Given his XR evidence of progression in L femur and PSA rise, we re-evaluated his progression with PSMA PET scan in September 2023, with note of mixed response.  Reconsented for  [employer] on 8/28/23.  Due for 3 month collection at end of November 2023.  Canceled Pluvicto Dose for November due to symptomatic progression with PSA rise. PSA is labile. Biopsy results from 11/21 are pending for evaluation of NEPC vs small cell given progression without a significant rise in PSA. Tempus peripheral blood mutation analysis is unrevealing for targetable mutations.   -Zometa next due 4/22/2024 along with Lupron.    -Palliative to manage further opiate refills. Pain is well controlled with fentanyl patch.   -Dexamethasone burst for RT and one on hand if needed for pain flares.      -Cabazitaxel started 11/27/23 given progression on Pluvicto. Tolerating treamtnet well with fatigue and mild nausea. Continue Zofran and Compazine as needed, patient to call clinic if these are not sufficient in controlling nausea.     -Started carbo/cabazitaxel 2/19/24 with good tolerance.  Continue prednisone daily while on cabazitaxel and Neulasta.      2. Pressure in chest, resolved:  Has an " ongoing sensation of pressure/need to cough in his chest, primarily against his sternum. Vital signs stable, negative pulmonary physical exam. No shortness of breath or chest pain. Chest CT ordered, will have imaging completed after infusion today. Will follow up with patient based on CT results.     3. Bone metastases:    - Noted to have osseous metastatic disease at the time of diagnosis. S/p radiation to C spine and sacrum (Re-irradiation to sacrum).   - worsening radiculopathy down starting in the low back and radiating down the left leg in June. MRI read was without new/acute abnormalities  - Pain continues to fluctuate but overall well controlled/managable   - pain mgmt per palliative care, pain is now well controlled with fentanyl patch.   - Encouraged to continue calcium and vitamin D supplementation; continue Zometa 4mg IV every ~3 months. Due next in January 2024, will give today, 1/29/24..   - RT to L femur 10/2023.  RT for bilateral posterior ribs 12/2023 with Dr. Albert.  - Added to trial list for [id number].       4. Stage 3, UISS-high risk ccRCC: s/p R nephrectomy   - Nephrectomy 3/19/19 and noted incidentally.  He is now 4.5 years post-op without evidence for recurrence.   - At this point, there is a minimal risk of recurrence of his RCC given the time since surgery without the use of adjuvant immunotherapy. There is no suspicious adenopathy or other features on his most recent imaging studies.    - Will continue to monitor with imaging planned for prostate cancer.      PLAN:   Infusion today.   Continue prednisone daily while on chemotherapy.   Keep dexamethasone on hand for pain flares.    Return in 3 weeks for next cycle with Sherry or Yamilet.    Infusion for Lupron/zometa with chemo on 4/22.  Let me know if you want this scheduled for another day.   See me in June for chemo infusion visit with scans the week before.      A total of 40 minutes were spent on this patient on the day of the encounter, of  which more than 50% of this time was used for counseling and coordination of care.  The patient was given the opportunity to ask multiple questions today, all of which were answered to their satisfaction.    The longitudinal plan of care for metastatic, hormone-resistant prostate cancer was addressed during this visit. Due to the added complexity in care, I will continue to support Walter Reyes in the subsequent management of this condition(s) and with the ongoing continuity of care of this condition(s).        Omar Mcnair MD, PhD   of Medicine   Oncology/BMT/Cellular Therapies

## 2024-03-11 ENCOUNTER — INFUSION THERAPY VISIT (OUTPATIENT)
Dept: ONCOLOGY | Facility: CLINIC | Age: 64
End: 2024-03-11
Attending: STUDENT IN AN ORGANIZED HEALTH CARE EDUCATION/TRAINING PROGRAM
Payer: COMMERCIAL

## 2024-03-11 ENCOUNTER — APPOINTMENT (OUTPATIENT)
Dept: LAB | Facility: CLINIC | Age: 64
End: 2024-03-11
Attending: STUDENT IN AN ORGANIZED HEALTH CARE EDUCATION/TRAINING PROGRAM
Payer: COMMERCIAL

## 2024-03-11 VITALS
DIASTOLIC BLOOD PRESSURE: 73 MMHG | HEART RATE: 48 BPM | RESPIRATION RATE: 16 BRPM | OXYGEN SATURATION: 99 % | TEMPERATURE: 98.5 F | SYSTOLIC BLOOD PRESSURE: 122 MMHG | BODY MASS INDEX: 36.03 KG/M2 | WEIGHT: 263.1 LBS

## 2024-03-11 DIAGNOSIS — C61 MALIGNANT NEOPLASM OF PROSTATE METASTATIC TO BONE (H): Primary | ICD-10-CM

## 2024-03-11 DIAGNOSIS — T45.1X5A ANTINEOPLASTIC CHEMOTHERAPY INDUCED ANEMIA: ICD-10-CM

## 2024-03-11 DIAGNOSIS — R11.0 CHEMOTHERAPY-INDUCED NAUSEA: ICD-10-CM

## 2024-03-11 DIAGNOSIS — C79.51 MALIGNANT NEOPLASM OF PROSTATE METASTATIC TO BONE (H): Primary | ICD-10-CM

## 2024-03-11 DIAGNOSIS — Z85.528 PERSONAL HISTORY OF RENAL CELL CARCINOMA: ICD-10-CM

## 2024-03-11 DIAGNOSIS — Z19.2 HORMONE RESISTANT PROSTATE CANCER (H): ICD-10-CM

## 2024-03-11 DIAGNOSIS — D64.81 ANTINEOPLASTIC CHEMOTHERAPY INDUCED ANEMIA: ICD-10-CM

## 2024-03-11 DIAGNOSIS — Z76.89 PREVENTION OF CHEMOTHERAPY-INDUCED NEUTROPENIA: ICD-10-CM

## 2024-03-11 DIAGNOSIS — C61 HORMONE RESISTANT PROSTATE CANCER (H): ICD-10-CM

## 2024-03-11 DIAGNOSIS — T45.1X5A CHEMOTHERAPY-INDUCED NAUSEA: ICD-10-CM

## 2024-03-11 LAB
ALBUMIN SERPL BCG-MCNC: 3.9 G/DL (ref 3.5–5.2)
ALP SERPL-CCNC: 82 U/L (ref 40–150)
ALT SERPL W P-5'-P-CCNC: 18 U/L (ref 0–70)
ANION GAP SERPL CALCULATED.3IONS-SCNC: 10 MMOL/L (ref 7–15)
AST SERPL W P-5'-P-CCNC: 17 U/L (ref 0–45)
BASOPHILS # BLD AUTO: 0 10E3/UL (ref 0–0.2)
BASOPHILS NFR BLD AUTO: 0 %
BILIRUB SERPL-MCNC: 0.3 MG/DL
BUN SERPL-MCNC: 12.8 MG/DL (ref 8–23)
CALCIUM SERPL-MCNC: 8.6 MG/DL (ref 8.8–10.2)
CHLORIDE SERPL-SCNC: 109 MMOL/L (ref 98–107)
CREAT SERPL-MCNC: 0.87 MG/DL (ref 0.67–1.17)
DEPRECATED HCO3 PLAS-SCNC: 23 MMOL/L (ref 22–29)
EGFRCR SERPLBLD CKD-EPI 2021: >90 ML/MIN/1.73M2
EOSINOPHIL # BLD AUTO: 0 10E3/UL (ref 0–0.7)
EOSINOPHIL NFR BLD AUTO: 0 %
ERYTHROCYTE [DISTWIDTH] IN BLOOD BY AUTOMATED COUNT: 18.7 % (ref 10–15)
GLUCOSE SERPL-MCNC: 93 MG/DL (ref 70–99)
HCT VFR BLD AUTO: 32.8 % (ref 40–53)
HGB BLD-MCNC: 10.5 G/DL (ref 13.3–17.7)
IMM GRANULOCYTES # BLD: 0 10E3/UL
IMM GRANULOCYTES NFR BLD: 1 %
LYMPHOCYTES # BLD AUTO: 0.7 10E3/UL (ref 0.8–5.3)
LYMPHOCYTES NFR BLD AUTO: 23 %
MCH RBC QN AUTO: 30.6 PG (ref 26.5–33)
MCHC RBC AUTO-ENTMCNC: 32 G/DL (ref 31.5–36.5)
MCV RBC AUTO: 96 FL (ref 78–100)
MONOCYTES # BLD AUTO: 0.5 10E3/UL (ref 0–1.3)
MONOCYTES NFR BLD AUTO: 15 %
NEUTROPHILS # BLD AUTO: 2 10E3/UL (ref 1.6–8.3)
NEUTROPHILS NFR BLD AUTO: 61 %
NRBC # BLD AUTO: 0 10E3/UL
NRBC BLD AUTO-RTO: 1 /100
PLATELET # BLD AUTO: 161 10E3/UL (ref 150–450)
POTASSIUM SERPL-SCNC: 3.9 MMOL/L (ref 3.4–5.3)
PROT SERPL-MCNC: 6 G/DL (ref 6.4–8.3)
PSA SERPL DL<=0.01 NG/ML-MCNC: 1.99 NG/ML (ref 0–4.5)
RBC # BLD AUTO: 3.43 10E6/UL (ref 4.4–5.9)
SODIUM SERPL-SCNC: 142 MMOL/L (ref 135–145)
WBC # BLD AUTO: 3.2 10E3/UL (ref 4–11)

## 2024-03-11 PROCEDURE — 96377 APPLICATON ON-BODY INJECTOR: CPT | Mod: 59

## 2024-03-11 PROCEDURE — 99215 OFFICE O/P EST HI 40 MIN: CPT | Performed by: STUDENT IN AN ORGANIZED HEALTH CARE EDUCATION/TRAINING PROGRAM

## 2024-03-11 PROCEDURE — 99213 OFFICE O/P EST LOW 20 MIN: CPT | Mod: 25 | Performed by: STUDENT IN AN ORGANIZED HEALTH CARE EDUCATION/TRAINING PROGRAM

## 2024-03-11 PROCEDURE — 96413 CHEMO IV INFUSION 1 HR: CPT

## 2024-03-11 PROCEDURE — 96372 THER/PROPH/DIAG INJ SC/IM: CPT | Performed by: STUDENT IN AN ORGANIZED HEALTH CARE EDUCATION/TRAINING PROGRAM

## 2024-03-11 PROCEDURE — 250N000011 HC RX IP 250 OP 636: Performed by: STUDENT IN AN ORGANIZED HEALTH CARE EDUCATION/TRAINING PROGRAM

## 2024-03-11 PROCEDURE — G2211 COMPLEX E/M VISIT ADD ON: HCPCS | Performed by: STUDENT IN AN ORGANIZED HEALTH CARE EDUCATION/TRAINING PROGRAM

## 2024-03-11 PROCEDURE — 36415 COLL VENOUS BLD VENIPUNCTURE: CPT | Performed by: STUDENT IN AN ORGANIZED HEALTH CARE EDUCATION/TRAINING PROGRAM

## 2024-03-11 PROCEDURE — 82374 ASSAY BLOOD CARBON DIOXIDE: CPT | Performed by: STUDENT IN AN ORGANIZED HEALTH CARE EDUCATION/TRAINING PROGRAM

## 2024-03-11 PROCEDURE — 96375 TX/PRO/DX INJ NEW DRUG ADDON: CPT

## 2024-03-11 PROCEDURE — 85025 COMPLETE CBC W/AUTO DIFF WBC: CPT | Performed by: STUDENT IN AN ORGANIZED HEALTH CARE EDUCATION/TRAINING PROGRAM

## 2024-03-11 PROCEDURE — 84153 ASSAY OF PSA TOTAL: CPT | Performed by: STUDENT IN AN ORGANIZED HEALTH CARE EDUCATION/TRAINING PROGRAM

## 2024-03-11 PROCEDURE — 36591 DRAW BLOOD OFF VENOUS DEVICE: CPT | Performed by: STUDENT IN AN ORGANIZED HEALTH CARE EDUCATION/TRAINING PROGRAM

## 2024-03-11 PROCEDURE — 258N000003 HC RX IP 258 OP 636: Performed by: STUDENT IN AN ORGANIZED HEALTH CARE EDUCATION/TRAINING PROGRAM

## 2024-03-11 PROCEDURE — 84403 ASSAY OF TOTAL TESTOSTERONE: CPT | Performed by: STUDENT IN AN ORGANIZED HEALTH CARE EDUCATION/TRAINING PROGRAM

## 2024-03-11 PROCEDURE — 96417 CHEMO IV INFUS EACH ADDL SEQ: CPT

## 2024-03-11 PROCEDURE — 96367 TX/PROPH/DG ADDL SEQ IV INF: CPT

## 2024-03-11 RX ORDER — ALBUTEROL SULFATE 0.83 MG/ML
2.5 SOLUTION RESPIRATORY (INHALATION)
Status: CANCELLED | OUTPATIENT
Start: 2024-03-11

## 2024-03-11 RX ORDER — HEPARIN SODIUM (PORCINE) LOCK FLUSH IV SOLN 100 UNIT/ML 100 UNIT/ML
5 SOLUTION INTRAVENOUS
Status: CANCELLED | OUTPATIENT
Start: 2024-03-11

## 2024-03-11 RX ORDER — HEPARIN SODIUM,PORCINE 10 UNIT/ML
5-20 VIAL (ML) INTRAVENOUS DAILY PRN
Status: CANCELLED | OUTPATIENT
Start: 2024-03-11

## 2024-03-11 RX ORDER — LORAZEPAM 2 MG/ML
0.5 INJECTION INTRAMUSCULAR EVERY 4 HOURS PRN
Status: CANCELLED | OUTPATIENT
Start: 2024-03-11

## 2024-03-11 RX ORDER — PALONOSETRON 0.05 MG/ML
0.25 INJECTION, SOLUTION INTRAVENOUS ONCE
Status: COMPLETED | OUTPATIENT
Start: 2024-03-11 | End: 2024-03-11

## 2024-03-11 RX ORDER — EPINEPHRINE 1 MG/ML
0.3 INJECTION, SOLUTION INTRAMUSCULAR; SUBCUTANEOUS EVERY 5 MIN PRN
Status: CANCELLED | OUTPATIENT
Start: 2024-03-11

## 2024-03-11 RX ORDER — HEPARIN SODIUM (PORCINE) LOCK FLUSH IV SOLN 100 UNIT/ML 100 UNIT/ML
5 SOLUTION INTRAVENOUS
Status: DISCONTINUED | OUTPATIENT
Start: 2024-03-11 | End: 2024-03-11 | Stop reason: HOSPADM

## 2024-03-11 RX ORDER — ALBUTEROL SULFATE 90 UG/1
1-2 AEROSOL, METERED RESPIRATORY (INHALATION)
Status: CANCELLED
Start: 2024-03-11

## 2024-03-11 RX ORDER — MEPERIDINE HYDROCHLORIDE 25 MG/ML
25 INJECTION INTRAMUSCULAR; INTRAVENOUS; SUBCUTANEOUS EVERY 30 MIN PRN
Status: CANCELLED | OUTPATIENT
Start: 2024-03-11

## 2024-03-11 RX ORDER — HEPARIN SODIUM (PORCINE) LOCK FLUSH IV SOLN 100 UNIT/ML 100 UNIT/ML
5 SOLUTION INTRAVENOUS
Status: COMPLETED | OUTPATIENT
Start: 2024-03-11 | End: 2024-03-11

## 2024-03-11 RX ORDER — PREDNISONE 5 MG/1
5 TABLET ORAL 2 TIMES DAILY WITH MEALS
Qty: 42 TABLET | Refills: 0 | Status: SHIPPED | OUTPATIENT
Start: 2024-03-11 | End: 2024-04-01

## 2024-03-11 RX ORDER — DIPHENHYDRAMINE HYDROCHLORIDE 50 MG/ML
50 INJECTION INTRAMUSCULAR; INTRAVENOUS
Status: CANCELLED
Start: 2024-03-11

## 2024-03-11 RX ORDER — METHYLPREDNISOLONE SODIUM SUCCINATE 125 MG/2ML
125 INJECTION, POWDER, LYOPHILIZED, FOR SOLUTION INTRAMUSCULAR; INTRAVENOUS
Status: CANCELLED
Start: 2024-03-11

## 2024-03-11 RX ORDER — PALONOSETRON 0.05 MG/ML
0.25 INJECTION, SOLUTION INTRAVENOUS ONCE
Status: CANCELLED | OUTPATIENT
Start: 2024-03-11

## 2024-03-11 RX ADMIN — DIPHENHYDRAMINE HYDROCHLORIDE 25 MG: 50 INJECTION, SOLUTION INTRAMUSCULAR; INTRAVENOUS at 11:57

## 2024-03-11 RX ADMIN — CARBOPLATIN 550 MG: 600 INJECTION, SOLUTION INTRAVENOUS at 13:26

## 2024-03-11 RX ADMIN — SODIUM CHLORIDE 250 ML: 9 INJECTION, SOLUTION INTRAVENOUS at 11:25

## 2024-03-11 RX ADMIN — PALONOSETRON HYDROCHLORIDE 0.25 MG: 0.25 INJECTION INTRAVENOUS at 11:25

## 2024-03-11 RX ADMIN — PEGFILGRASTIM 6 MG: KIT SUBCUTANEOUS at 13:29

## 2024-03-11 RX ADMIN — Medication 5 ML: at 13:56

## 2024-03-11 RX ADMIN — Medication 5 ML: at 08:39

## 2024-03-11 RX ADMIN — FAMOTIDINE 20 MG: 10 INJECTION INTRAVENOUS at 11:25

## 2024-03-11 RX ADMIN — FOSAPREPITANT: 150 INJECTION, POWDER, LYOPHILIZED, FOR SOLUTION INTRAVENOUS at 11:31

## 2024-03-11 RX ADMIN — SODIUM CHLORIDE 49 MG: 0.9 INJECTION, SOLUTION INTRAVENOUS at 12:21

## 2024-03-11 ASSESSMENT — PAIN SCALES - GENERAL: PAINLEVEL: NO PAIN (0)

## 2024-03-11 NOTE — NURSING NOTE
"Chief Complaint   Patient presents with    Port Draw     Labs drawn from port by rn.  VS taken.     Port accessed with 20 gauge 3/4\" gripper needle and labs drawn by rn.  Port flushed with NS and heparin.  Pt tolerated well.  VS taken.  Pt checked in for next appt.    Nadege Mendoza RN      "

## 2024-03-11 NOTE — PATIENT INSTRUCTIONS
Contact Numbers  [facility]: [phone] (for symptom and scheduling needs)    Please call the [facility] Triage line if you experience a temperature greater than or equal to 100.4, shaking chills, have uncontrolled nausea, vomiting and/or diarrhea, dizziness, shortness of breath, chest pain, bleeding, unexplained bruising, or if you have any other new/concerning symptoms, questions or concerns.     If you are having any concerning symptoms or wish to speak to a provider before your next infusion visit, please call your care coordinator or triage to notify them so we can adequately serve you.     If you need a refill on a narcotic prescription or other medication, please call triage before your infusion appointment.           March 2024 Sunday Monday Tuesday Wednesday Thursday Friday Saturday                            1    PT CANCER REHAB TREATMENT   8:00 AM   (45 min.)   Micheline Rascon, PT   [facility] 2       3     4     5     6     7     8     9       10     11    LAB CENTRAL   8:45 AM   (15 min.)   [facility]   [facility]    RETURN CCSL   9:15 AM   (30 min.)   Omar Mcnair MD   [facility]    ONC INFUSION 3 HR (180 MIN)  11:00 AM   (180 min.)    ONC INFUSION NURSE   [facility] 12     13     14     15     16       17     18     19    PT CANCER REHAB TREATMENT   8:15 AM   (45 min.)   Micheline Rascon, PT   [facility] 20     21     22     23       24     25     26     27     28    RETURN PALLIATIVE  11:00 AM   (40 min.)   Dean Zeng APRN CNP   [facility] 29 30 31 April 2024 Sunday Monday Tuesday Wednesday Thursday Friday Saturday        1     2    PT CANCER REHAB TREATMENT   9:45 AM   (45 min.)   Abiodun  Micheline Mckeon, PT   [facility] [place] 3     4     5     6       7     8     9    PT CANCER REHAB TREATMENT   8:15 AM   (45 min.)   Micheline Rascon, PT   [facility] [place] 10     11     12     13       14     15     16    PT CANCER REHAB TREATMENT   8:15 AM   (45 min.)   Micheline Rascon, PT   [facility] [place] 17     18     19     20       21     22     23    PT CANCER REHAB TREATMENT   8:15 AM   (45 min.)   Micheline Rascon, PT   [facility] [place] 24     25     26     27       28     29     30    PT CANCER REHAB TREATMENT   8:15 AM   (45 min.)   Micheline Rascon, PT   [facility]                                     Lab Results:  Recent Results (from the past 12 hour(s))   Comprehensive metabolic panel    Collection Time: 03/11/24  8:49 AM   Result Value Ref Range    Sodium 142 135 - 145 mmol/L    Potassium 3.9 3.4 - 5.3 mmol/L    Carbon Dioxide (CO2) 23 22 - 29 mmol/L    Anion Gap 10 7 - 15 mmol/L    Urea Nitrogen 12.8 8.0 - 23.0 mg/dL    Creatinine 0.87 0.67 - 1.17 mg/dL    GFR Estimate >90 >60 mL/min/1.73m2    Calcium 8.6 (L) 8.8 - 10.2 mg/dL    Chloride 109 (H) 98 - 107 mmol/L    Glucose 93 70 - 99 mg/dL    Alkaline Phosphatase 82 40 - 150 U/L    AST 17 0 - 45 U/L    ALT 18 0 - 70 U/L    Protein Total 6.0 (L) 6.4 - 8.3 g/dL    Albumin 3.9 3.5 - 5.2 g/dL    Bilirubin Total 0.3 <=1.2 mg/dL   PSA tumor marker    Collection Time: 03/11/24  8:49 AM   Result Value Ref Range    PSA Tumor Marker 1.99 0.00 - 4.50 ng/mL   CBC with platelets and differential    Collection Time: 03/11/24  8:49 AM   Result Value Ref Range    WBC Count 3.2 (L) 4.0 - 11.0 10e3/uL    RBC Count 3.43 (L) 4.40 - 5.90 10e6/uL    Hemoglobin 10.5 (L) 13.3 - 17.7 g/dL    Hematocrit 32.8 (L) 40.0 - 53.0 %    MCV 96 78 - 100  fL    MCH 30.6 26.5 - 33.0 pg    MCHC 32.0 31.5 - 36.5 g/dL    RDW 18.7 (H) 10.0 - 15.0 %    Platelet Count 161 150 - 450 10e3/uL    % Neutrophils 61 %    % Lymphocytes 23 %    % Monocytes 15 %    % Eosinophils 0 %    % Basophils 0 %    % Immature Granulocytes 1 %    NRBCs per 100 WBC 1 (H) <1 /100    Absolute Neutrophils 2.0 1.6 - 8.3 10e3/uL    Absolute Lymphocytes 0.7 (L) 0.8 - 5.3 10e3/uL    Absolute Monocytes 0.5 0.0 - 1.3 10e3/uL    Absolute Eosinophils 0.0 0.0 - 0.7 10e3/uL    Absolute Basophils 0.0 0.0 - 0.2 10e3/uL    Absolute Immature Granulocytes 0.0 <=0.4 10e3/uL    Absolute NRBCs 0.0 10e3/uL

## 2024-03-11 NOTE — PROGRESS NOTES
Infusion Nursing Note:  Walter Reyes presents today for Cycle 2 Day 1 Cabazitaxel, Carboplatin and Neulasta OnPro.    Patient seen by provider today: Yes: Dr. Mcnair   present during visit today: Not Applicable.    Note: Patient presents to infusion today doing well. No new questions or concerns following his visit with Dr. Mcnair.    TORB @ 1100 Dr. Mcnair/ Brisa Mancini RN:  - OK to proceed with treatment today with HR 48, no interventions needed as he is typically bradycardic    Intravenous Access:  Implanted Port.    Treatment Conditions:  Lab Results   Component Value Date    HGB 10.5 (L) 03/11/2024    WBC 3.2 (L) 03/11/2024    ANEU 5.0 07/07/2021    ANEUTAUTO 2.0 03/11/2024     03/11/2024        Lab Results   Component Value Date     03/11/2024    POTASSIUM 3.9 03/11/2024    MAG 1.9 08/25/2023    CR 0.87 03/11/2024    BETHANY 8.6 (L) 03/11/2024    BILITOTAL 0.3 03/11/2024    ALBUMIN 3.9 03/11/2024    ALT 18 03/11/2024    AST 17 03/11/2024     Results reviewed, labs MET treatment parameters, ok to proceed with treatment.    Post Infusion Assessment:  Patient tolerated infusion without incident.  Blood return noted pre and post infusion.  Site patent and intact, free from redness, edema or discomfort.  No evidence of extravasations.  Access discontinued per protocol.     Neulasta Onpro On-Body injector applied to Left Lower Abdomen at 1330.  Writer discussed Neulasta injection would start tomorrow 3/12 at 1630, approximately 27 hours after application applied today.    Written and Verbal instruction reviewed with patient.  Pt instructed when the dose delivery starts, it will take about 45 minutes to complete.  Pt aware Neulasta Onpro On-Body should have green flashing light and to call triage or on-call MD if injector flashes red or appears to be leaking.   Pt aware to keep Onpro On-Body Neulasta 4 inches away from electrical equipment and to avoid showering 4 hours prior to injection.    Neulasta Onpro Lot number: See MAR.    Discharge Plan:   Prescription refills given for Prednisone and Dexamethasone. (Patient forgot to ask for dexamethasone refill to be brought up to infusion so he will  at downstairs pharmacy on way out.)  Discharge instructions reviewed with: Patient.  Patient and/or family verbalized understanding of discharge instructions and all questions answered.  Copy of AVS reviewed with patient and/or family. Next appointment not yet scheduled at time of discharge. Patient aware to watch MYCHART and to call if he does not hear anything.   Patient discharged in stable condition accompanied by: wife.  Departure Mode: Ambulatory.      Nati Mancini RN

## 2024-03-11 NOTE — NURSING NOTE
"Oncology Rooming Note    March 11, 2024 9:32 AM   Walter Reyes is a 64 year old male who presents for:    Chief Complaint   Patient presents with    Port Draw     Labs drawn from port by rn.  VS taken.    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    Weight as of this encounter: 119.3 kg (263 lb 1.6 oz). Body surface area is 2.46 meters squared.  No Pain (0) Comment: Data Unavailable   No LMP for male patient.  Allergies reviewed: Yes  Medications reviewed: Yes    Medications: Medication refills not needed today.  Pharmacy name entered into EPIC:    [facility] [place], [place] - [address] [facility] - [place], [place] - [address] [place] [address] PHARMACY [place], [place] - [phone]  [facility] [place], [place] - [address] [facility]    Frailty Screening:   Is the patient here for a new oncology consult visit in cancer care? 2. No      Clinical concerns: wonders if and when he's getting neulasta.     With new medication, is he starting over with 1/6 again or 6/6 and done.     Has frequent urges to urinate specifically at night     Mick Roth              "

## 2024-03-11 NOTE — LETTER
"    3/11/2024         RE: Walter Reyes  [id number]  [place] [id number]        Dear Colleague,    Thank you for referring your patient, Walter Reyes, to the [facility]. Please see a copy of my visit note below.        [facility] Oncology Followup  Mar 11, 2024    REASON FOR VISIT: Follow-up for metastatic castration-resistant prostate cancer.      INTERVAL HISTORY: [place] really good weeks 2-3 of cycle 1.  Energy was really good for once.  Happy to see that he will be receiving Neulasta again.  No fevers or chills.  Appetite is good.  No new pains.  Pain control is good with current regimen.  Hoping to avoid Lupron/zometa infusions at same time as regular chemo appts.        Review of Systems:  Remainder of complete ROS reviewed and negative except as noted in interval history.     ONCOLOGY HISTORY: Mr. 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 His oncologic history is detailed below.     1. 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 Naylor at [facility]. Prior PSA were 1.4 on 8/16/17, 2.4 on 6/28/16, 2.9 on 6/28/16, and as low as 0.4 on 3/26/2003.   - 1/08/2019: Consultation with Sarah Wilson CNP in Urology clinic. Repeat PSA 9.6.  - 1/16/2019: MRI prostate with contrast - \"This examination is characterized as PIRADS 5- very high probability. Clinically significant cancer is highly likely to be present. There is a large, invasive mass arising from the right peripheral zone and extending into the neurovascular bundle, seminal vesicle, and along the " "anterolateral right mesorectal fascia. Metastatic right external iliac lymph node. Metastatic lesion in the posterior/superior right acetabulum.\"  - 1/25/2019: CT abdomen and pelvis with IV contrast - \"1. Heterogeneous enhancing mass posteriorly in the upper pole of the right kidney measures 4.5 x 5.8 x 5.7 cm (AP by transverse by craniocaudal). It has a small nodular component extending posterior medially which abuts the right psoas muscle. This nodular extension measures 2.1 x 2.1 cm. Minimal stranding about the mass. No definite thrombus within the right renal vein. This renal mass is compatible with a renal cell carcinoma. A paraaortic lymph node situated immediately posterior to the left renal vein measures 1.1 cm in short axis, suspicious for metastasis. 2. A 2 cm right external iliac lymph node is also suspicious for metastases. 3. Multiple sclerotic osseous lesions suspicious for metastases. These include 0.8 and 0.6 cm sclerotic lesions laterally in the right iliac wing (images 53 and 62 respectively). Ill-defined groundglass density laterally in the right acetabulum measuring 1.7 x 1.2 cm corresponds with the lesion identified on MRI. A 0.4 cm groundglass density in the left acetabulum. Sclerotic lesion in the left femoral neck measures 0.9 x 1.6 cm (image 81). Sclerotic metastases would be more compatible with prostate metastases. 4. A 3 subcentimeter hepatic lesions are indeterminate. Metastases would be a consideration. These can be further characterized with liver MRI.\"  - 1/25/2019: NM bone scan - \"There is focal bony uptake in the left femoral neck, right acetabulum, right of midline at the S1 or L5 level of the spine, within multiple bilateral ribs, in the C7 or T1 level of the spine, and anteriorly within the skull. Findings are suspicious for metastases.\"  - 1/31/19: CT chest with contrast - \" No suspicious nodules in the chest.  Stable appearance of a 5.8 cm right renal mass concerning for renal " "carcinoma until proven otherwise.  Stable indeterminant subcentimeter hypodensities in the liver.  Multifocal osteoblastic metastasis including 2.5 cm lesion in the right fifth rib posteriorly and a 1.6 cm lesion in the right third rib posteriorly. No lytic lesions identified.\"  - 2/6/19: CT-guided right sclerotic fifth rib lesion biopsy - \"Metastatic carcinoma, consistent with prostate primary.  Immunohistochemical stains performed show the metastatic carcinoma stains positive for NKX3.1 (prostate marker), negative for STACIE 3 and PAX8, which supports the above diagnosis.\"  - 2/15/19: Started Casodex 50mg every day and consented for the biobanking protocol. 3/18/19 - stopped Casodex.  - 2/19/19: Case discussed in tumor board - recommendation for nephectomy for suspected malignant right renal mass.   - 2/26/19: Started Lupron 22.5mg every 3 months.   - 5/8/19: Started Docetaxel 75mg/m2 IV every 3 weeks. 06/18/19 - cycle 3, 7/10/19 - cycle 4, 07/31/19 - cycle 5, 08/21/19 - cycle 6.   - 10/2/19: Restaging CT CAP and NM Bone Scan showed improved osseous disease, no evidence of recurrent or new metastatic disease.  - 1/5/20: Restaging CT CAP and NM Bone Scan showed stable osseous disease, no evidence of recurrent or new metastatic disease.  - 4/6/20: NM bone scan with slightly improved uptake in known skeletal mets. CT C/A/P with contrast with stable osseous mets, no yusuf enlargement, no new visceral mets etc.  - 04/08/20: Zometa every 3 months.  - 10/5/20: CT C/A/P with contrast and NM bone scan - SD.   - 1/4/21: CT C/A/P with contrast and NM bone scan - SD but slight increase in right 5th rib tracer uptake and in posterior L5 sclerosis. 1/6/21  PSA = 0.29  - 03/29/21: CT C/A/P with contrast - single new right sacral 8mm bone lesion (suspicious), other bone mets stable. No visceral/yusuf disease. Nephrectomy site without recurrence. NM bone scan - SD but \"increased uptake associated with the prior lesions of the " "lower  cervical spine, posterior right fourth rib and right L5 posterior arch elements. Otherwise unchanged uptake associated with the proximal left femur and left anterior fourth rib. Similar uptake of the left halux, possibly secondary to degenerative osteoarthritis or gout.\"    3/31/21 PSA = 0.44  7/7/21  PSA = 0.47  11/2021 PSA = 1.05  -- Start XTANDI  12/16/21 PSA =0.22  2/11/22 PSA= 0.50  3/22/22 PSA=0.72  5/5/2022 PSA=1.79  7/11/2022 PSA=2.16 --Start cycle 1 docetaxel   8/22/22 PSA = 1.36  9/12/22 PSA = 1.09  10/24/22 Cycle #6 of Docetaxel. End of treatment  1/9/23  PSA =0.66  3/20/23  PSA=1.54  4/21/23 PSA = 1.81  T=15  5/22/23 C1 Pluvicto   06/07/23          PSA = 1.42  7/18/23 PSA=1.75, C2 Pluvicto   8/28/23 Transfer of care initial visit for Tabby.  PSA 2.06.  C3 Pluvicto scheduled for 8/30.  Proceed with dose as planned.  MR L femur, ortho referral, PSMA PET ordered for prior to follow-up.    10/2/23 Palliative RT to L femur metastasis complete.  PSMA PET with mixed response.  PSA 2.21.  RT to L femur pending.  Continue with Dose #4 of Pluvicto despite mixed response to therapy.  Dex course for possible pain flare with RT.    11/8/23  Follow-up prior to Dose #5 Pluvicto.  More pain in chest/ribs/back. PSA 4.38.  Testosterone=3.  Rad onc referral for ribs.  Biopsy progressive lesion for progression on Pluvicto.  Cabazitaxel scheduled for follow-up appointment.  Tempus blood testing unrevealing for targetable mutation.    11/27/23 Bx completed from R iliac crest but unrevealing for either PCa or RCC.  Start cabazitaxel C1D1.  PSA 2.81.    12/19/23 PSA = 3.66  1/8/24 PSA = 3.55  1/29/24 PSA = 3.07  2/19/24 PSA= 2.21-Start carboplatin/cabazitaxel.    3/11/24 PSA= C2 Carbo/cabazi.  PSA 1.99.        Radiation history:   -C7         3,000 cGy       06 X      8/05/2021        8/18/2021        13       10  -2 Sacrum          2,500 cGy       18 X      4/12/2022        4/18/2022         6           -Sacrum retreat  " "             700 cGy        18 X      2/28/2023        2/28/2023  -Left femur to 2000 cGy in 5 fractions, completed 10/10/2023   -bilateral posterior chest wall at an area of osseous involvement of the ribs and adjacent T4 and 5 spine, to be delivered to 2000 cGy in 5 fractions.  completed 12/13-12/19/23.      2. Stage III ([id number]), grade 3 of 4, clear-cell RCC of right kidney:  - Incidentally diagnosed as above.   - Underwent curative-intent robotic right radical nephrectomy with Dr. Wesley Cleveland on 3/19/19. No tumor spillage per op report.   - Path showed: \"Histologic Type: Clear cell renal cell carcinoma; Sarcomatoid Features: Not identified; Histologic Grade: Nucleolar grade 3 (WHO/ISUP). Extent Tumor Size: 4.3 cm. Microscopic Tumor Extension: Tumor extension into renal sinus (in vascular structures). Margins: Negative. Tumor Necrosis: Present; focal. Lymph-Vascular Invasion: Not identified.  Pathologic Staging (pTNM) Primary Tumor (pT): pT3a: Tumor extends into renal vein branches/renal sinus. Regional Lymph Nodes (pN): pNX. Number of Lymph Nodes Examined: 0 Distant Metastasis (pM): pM N/A.\"  - Restaging scans as above.  No current concerns for recurrence.      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 Take with food., Disp: 6 " tablet, Rfl: 2    dexAMETHasone (DECADRON) 4 MG tablet, Take 1 tablet (4 mg) by mouth daily, Disp: 7 tablet, Rfl: 2    fentaNYL (DURAGESIC) 25 mcg/hr 72 hr patch, Place 1 patch onto the skin every 72 hours remove old patch., Disp: 10 patch, Rfl: 0    gabapentin (NEURONTIN) 300 MG capsule, TAKE ONE CAPSULE BY MOUTH TWICE DAILY AND 2 CAPSULES AT BEDTIME, Disp: 120 capsule, Rfl: 3    levothyroxine (SYNTHROID/LEVOTHROID) 112 MCG tablet, Take 112 mcg by mouth daily, Disp: , Rfl:     lidocaine, viscous, (XYLOCAINE) 2 % solution, Swish and swallow 15 mLs in mouth every 3 hours as needed for pain (before meals and at bedtime) ; Max 8 doses/24 hour period., Disp: 100 mL, Rfl: 1    loratadine (CLARITIN) 10 MG tablet, Take 10 mg by mouth daily, Disp: , Rfl:     Multiple Vitamins-Minerals (MULTIVITAMIN ADULT EXTRA C PO), Take 1 tablet by mouth every 24 hours, Disp: , Rfl:     naloxone (NARCAN) 4 MG/0.1ML nasal spray, Spray 1 spray (4 mg) into one nostril alternating nostrils as needed for opioid reversal every 2-3 minutes until assistance arrives, Disp: 0.2 mL, Rfl: 1    Nutritional Supplements (SALMON OIL) CAPS, Take 2 capsules by mouth daily , Disp: , Rfl:     omeprazole (PRILOSEC) 20 MG DR capsule, Take 20 mg by mouth daily, Disp: , Rfl:     ondansetron (ZOFRAN) 8 MG tablet, Take 1 tablet (8 mg) by mouth every 8 hours as needed for nausea (vomiting), Disp: 30 tablet, Rfl: 2    ondansetron (ZOFRAN) 8 MG tablet, Take 1 tablet (8 mg) by mouth every 8 hours as needed for nausea, Disp: 30 tablet, Rfl: 4    oxyCODONE (ROXICODONE) 5 MG tablet, Take 1-2 tablets (5-10 mg) by mouth every 3 hours as needed for severe pain, Disp: 90 tablet, Rfl: 0    predniSONE (DELTASONE) 5 MG tablet, Take 1 tablet (5 mg) by mouth 2 times daily (with meals) for 21 days, Disp: 42 tablet, Rfl: 0    prochlorperazine (COMPAZINE) 10 MG tablet, Take 1 tablet (10 mg) by mouth every 6 hours as needed for nausea or vomiting, Disp: 30 tablet, Rfl: 2     tamsulosin (FLOMAX) 0.4 MG capsule, Take 1 capsule (0.4 mg) by mouth daily, Disp: 30 capsule, Rfl: 3    triamterene-HCTZ (MAXZIDE-25) 37.5-25 MG tablet, Take 1 tablet by mouth daily (Patient not taking: Reported on 12/19/2023), Disp: , Rfl:       Physical Exam:   There were no vitals taken for this visit.  Wt Readings from Last 10 Encounters:   02/19/24 121.2 kg (267 lb 4.8 oz)   02/13/24 121.4 kg (267 lb 11.2 oz)   01/29/24 122.7 kg (270 lb 6.4 oz)   01/24/24 117.9 kg (260 lb)   01/08/24 119.6 kg (263 lb 9.6 oz)   12/20/23 117.9 kg (260 lb)   12/19/23 118.4 kg (261 lb)   12/13/23 118 kg (260 lb 1.6 oz)   11/27/23 117.9 kg (259 lb 14.4 oz)   11/15/23 117.9 kg (260 lb)   General: The patient is a pleasant male in no acute distress.  HEENT: EOMI. Sclerae are anicteric. Mucous membranes moist without lesions or thrush.   Lymph: Neck is supple with no lymphadenopathy in the cervical or supraclavicular areas.   Heart: Regular rate and rhythm.   Lungs: Clear to auscultation bilaterally.   Abdomen: Bowel sounds present, soft, nontender with no palpable hepatosplenomegaly or masses.   Extremities: No lower extremity edema noted bilaterally.   Neuro: Cranial nerves II through XII are grossly intact.  Skin: No rashes, petechiae, or bruising noted on exposed skin.  Psych: affect bright, alert and oriented    LABORATORY DATA:  Most Recent 3 CBC's:  Recent Labs   Lab Test 02/19/24  1103 01/29/24  1152 01/08/24  0945   WBC 4.0 4.9 3.4*   HGB 10.5* 10.4* 10.4*   MCV 93 91 91    216 163   ANEUTAUTO 2.6 3.6 2.2     Most Recent 3 BMP's:  Recent Labs   Lab Test 02/19/24  1103 01/29/24  1152 01/08/24  0945    140 142   POTASSIUM 3.7 4.0 3.9   CHLORIDE 108* 104 108*   CO2 24 26 25   BUN 13.5 13.3 11.6   CR 1.05 0.97 0.91   ANIONGAP 11 10 9   BETHANY 8.6* 8.9 8.6*   * 100* 96   PROTTOTAL 6.2* 6.2* 6.3*   ALBUMIN 3.9 3.8 3.9    Most Recent 3 LFT's:  Recent Labs   Lab Test 02/19/24  1103 01/29/24  1152 01/08/24  0945   AST  "16 18 18   ALT 15 16 13   ALKPHOS 87 122 159*   BILITOTAL 0.3 0.3 0.4     I reviewed the above labs today.    PSA trend, see oncology history.      ASSESSMENT & PLAN: Mr. Reyes is a 64 year old gentleman with metastatic castration sensitive prostate adenocarcinoma as well as an incidentally diagnosed stage III clear-cell renal cell carcinoma of the right kidney (s/p radical R nephrectomy 3/19/19), who is here for a follow-up visit and initiation of docetafor now metastatic castration-resistant prostate cancer.     1. Metastatic castration-resistant prostate cancer, with involvement of the bones and RPLN:   - Patient met the criteria for CHAARTED \"high-volume\" metastatic hormone-sensitive prostate cancer. He opted for docetaxel in combination with ADT (per JOSEFA, [id number], KARLA). He was subsequently treated with docetaxel x6 doses in the [facility] setting and enzalutamide.  He initiated Pluvicto in May 2023 with an initial slight decline in PSA, but this began to rise just prior to Cycle 3.  Given his XR evidence of progression in L femur and PSA rise, we re-evaluated his progression with PSMA PET scan in September 2023, with note of mixed response.  Reconsented for  [employer] on 8/28/23.  Due for 3 month collection at end of November 2023.  Canceled Pluvicto Dose for November due to symptomatic progression with PSA rise. PSA is labile. Biopsy results from 11/21 are pending for evaluation of NEPC vs small cell given progression without a significant rise in PSA. Tempus peripheral blood mutation analysis is unrevealing for targetable mutations.   -Zometa next due 4/22/2024 along with Lupron.    -Palliative to manage further opiate refills. Pain is well controlled with fentanyl patch.   -Dexamethasone burst for RT and one on hand if needed for pain flares.      -Cabazitaxel started 11/27/23 given progression on Pluvicto. Tolerating treamtnet well with fatigue and mild nausea. Continue Zofran and Compazine " as needed, patient to call clinic if these are not sufficient in controlling nausea.     -Started carbo/cabazitaxel 2/19/24 with good tolerance.  Continue prednisone daily while on cabazitaxel and Neulasta.      2. Pressure in chest, resolved:  Has an ongoing sensation of pressure/need to cough in his chest, primarily against his sternum. Vital signs stable, negative pulmonary physical exam. No shortness of breath or chest pain. Chest CT ordered, will have imaging completed after infusion today. Will follow up with patient based on CT results.     3. Bone metastases:    - Noted to have osseous metastatic disease at the time of diagnosis. S/p radiation to C spine and sacrum (Re-irradiation to sacrum).   - worsening radiculopathy down starting in the low back and radiating down the left leg in June. MRI read was without new/acute abnormalities  - Pain continues to fluctuate but overall well controlled/managable   - pain mgmt per palliative care, pain is now well controlled with fentanyl patch.   - Encouraged to continue calcium and vitamin D supplementation; continue Zometa 4mg IV every ~3 months. Due next in January 2024, will give today, 1/29/24..   - RT to L femur 10/2023.  RT for bilateral posterior ribs 12/2023 with Dr. Albert.  - Added to trial list for [id number].       4. Stage 3, UISS-high risk ccRCC: s/p R nephrectomy   - Nephrectomy 3/19/19 and noted incidentally.  He is now 4.5 years post-op without evidence for recurrence.   - At this point, there is a minimal risk of recurrence of his RCC given the time since surgery without the use of adjuvant immunotherapy. There is no suspicious adenopathy or other features on his most recent imaging studies.    - Will continue to monitor with imaging planned for prostate cancer.      PLAN:   Infusion today.   Continue prednisone daily while on chemotherapy.   Keep dexamethasone on hand for pain flares.    Return in 3 weeks for next cycle with Sherry or Yamilet.    Infusion  for Lupron/zometa with chemo on 4/22.  Let me know if you want this scheduled for another day.   See me in June for chemo infusion visit with scans the week before.      A total of 40 minutes were spent on this patient on the day of the encounter, of which more than 50% of this time was used for counseling and coordination of care.  The patient was given the opportunity to ask multiple questions today, all of which were answered to their satisfaction.    The longitudinal plan of care for metastatic, hormone-resistant prostate cancer was addressed during this visit. Due to the added complexity in care, I will continue to support Walter Reyes in the subsequent management of this condition(s) and with the ongoing continuity of care of this condition(s).        Omar Mcnair MD, PhD   of Medicine   Oncology/BMT/Cellular Therapies

## 2024-03-11 NOTE — PATIENT INSTRUCTIONS
Walter,   Glad to see you tolerated chemo well the last few weeks.      Infusion today.   Continue prednisone daily while on chemotherapy.   Keep dexamethasone on hand for pain flares.    Return in 3 weeks for next cycle with Sherry or Yamilet.    Infusion for Lupron/zometa with chemo on 4/22.  Let me know if you want this scheduled for another day.   See me in June for chemo infusion visit with scans the week before.

## 2024-03-13 LAB — TESTOST SERPL-MCNC: 2 NG/DL (ref 240–950)

## 2024-03-19 ENCOUNTER — THERAPY VISIT (OUTPATIENT)
Dept: PHYSICAL THERAPY | Facility: CLINIC | Age: 64
End: 2024-03-19
Payer: COMMERCIAL

## 2024-03-19 DIAGNOSIS — C61 MALIGNANT NEOPLASM OF PROSTATE METASTATIC TO BONE (H): Primary | ICD-10-CM

## 2024-03-19 DIAGNOSIS — C79.51 MALIGNANT NEOPLASM OF PROSTATE METASTATIC TO BONE (H): Primary | ICD-10-CM

## 2024-03-19 PROCEDURE — 97112 NEUROMUSCULAR REEDUCATION: CPT | Mod: GP | Performed by: PHYSICAL THERAPIST

## 2024-03-19 PROCEDURE — 97110 THERAPEUTIC EXERCISES: CPT | Mod: GP | Performed by: PHYSICAL THERAPIST

## 2024-03-19 NOTE — PROGRESS NOTES
03/19/24 0500   Appointment Info   Signing clinician's name / credentials Micheline Rascon, PT   Total/Authorized Visits LIMITED TO 20 PT VISITS PER [facility]ANY YR. HARD MAX. 0 USED.   Visits Used 4/20   PT Tx Diagnosis Force production deficit   Progress Note/Certification   Onset of illness/injury or Date of Surgery 01/29/24   Therapy Frequency 1x/week   Predicted Duration 10 weeks   Progress Note Due Date 04/12/24   Progress Note Completed Date 02/02/24   GOALS   PT Goals 2;3;4;5   PT Goal 1   Goal Identifier HEP   Goal Description Pt will be able to demonstrate HEP activities without need for cues from provider to demonstrate understanding and independence with HEP.   Rationale to maximize safety and independence with performance of ADLs and functional tasks   Goal Progress 3/19/24 demonstrates good awareness of HEP and how to modify prn.   Target Date 04/12/24   PT Goal 2   Goal Identifier LE strength   Goal Description Pt will be able to perform 5xSTS in 12 seconds or less to demonstrate appropriate LE strength for community dwelling adults.   Rationale to maximize safety and independence with performance of ADLs and functional tasks   Goal Progress Eval: 20s 3/19/23 13.09 sec - close to meeting demonstrates the ability to further progress.   Target Date 04/12/24   Date Met   (not fully met.)   PT Goal 3   Goal Identifier Activity tolerance   Goal Description Pt will report improved activity tolerance as shown by ability to perform HEP created for 2 weeks in a row without L femur or sacral pain to allow for continued strength training and functional mobility.   Rationale to maximize safety and independence with performance of ADLs and functional tasks;to maximize safety and independence within the home;to maximize safety and independence within the community;to maximize safety and independence with transportation;to maximize safety and independence with self cares   Goal Progress 3/19/24 states that he does not have  issues with the L thigh pain 3/1/24 He states that he has been doing his stationary bike about 15 min a day and tolerating this well for the last week, activity varies [facility] Stopped doing the sit to stands as was leading to L femur pain after completing the exercise.   Target Date 04/12/24   Date Met 03/19/24   PT Goal 4   Goal Identifier Gait speed   Goal Description Pt will demonstrate a gait speed greater than 1.2m/s to demonstrate improved safety with crossing streets.   Rationale to maximize safety and independence with performance of ADLs and functional tasks   Goal Progress Eval: 1.13m/s  3/19/24 7.06 sec 1.08 m/s self selected speed, cued to go faster 5.94 sec or 1.28 m/s.   Target Date 04/12/24   Date Met 03/19/24   PT Goal 5   Goal Identifier Static balance   Goal Description Pt will be able to maintain static stance on foam mat with EC for at least 30 seconds to demonstrate improved balance   Rationale to maximize safety and independence with performance of ADLs and functional tasks   Goal Progress Eval: 17s with moderate postural sway  3/1/24 30 sec on foam with minimal sway - will continue to assess as may vary depending on where he is in chemo cycle. 3/19/24 30 sec EC on foam rhomberg.   Target Date 04/12/24   Date Met 03/19/24  (3/1/24 - able to complete)         DISCHARGE  Reason for Discharge: Patient has met all goals.    Equipment Issued: HEP    Discharge Plan: Patient to continue home program.    Referring Provider:  Sherry Lee

## 2024-03-28 ENCOUNTER — VIRTUAL VISIT (OUTPATIENT)
Dept: PALLIATIVE MEDICINE | Facility: CLINIC | Age: 64
End: 2024-03-28
Payer: COMMERCIAL

## 2024-03-28 VITALS — BODY MASS INDEX: 35.84 KG/M2 | HEIGHT: 71 IN | WEIGHT: 256 LBS

## 2024-03-28 DIAGNOSIS — G89.3 CANCER ASSOCIATED PAIN: Primary | ICD-10-CM

## 2024-03-28 DIAGNOSIS — C61 PROSTATE CANCER (H): ICD-10-CM

## 2024-03-28 PROCEDURE — 99214 OFFICE O/P EST MOD 30 MIN: CPT | Mod: 95 | Performed by: NURSE PRACTITIONER

## 2024-03-28 RX ORDER — OXYCODONE HYDROCHLORIDE 5 MG/1
5-10 TABLET ORAL
Qty: 90 TABLET | Refills: 0 | Status: SHIPPED | OUTPATIENT
Start: 2024-03-28 | End: 2024-07-03

## 2024-03-28 ASSESSMENT — PAIN SCALES - GENERAL: PAINLEVEL: NO PAIN (0)

## 2024-03-28 NOTE — NURSING NOTE
Is the patient currently in the state of [place]? YES    Visit mode:VIDEO    If the visit is dropped, the patient can be reconnected by: VIDEO VISIT: Text to cell phone:   Telephone Information:   Mobile [phone]       Will anyone else be joining the visit? NO  (If patient encounters technical issues they should call [phone]    How would you like to obtain your AVS? MyChart    Are changes needed to the allergy or medication list? No    Reason for visit: MERCED KAY

## 2024-03-28 NOTE — PROGRESS NOTES
Virtual Visit Details    Type of service:  Video Visit     Originating Location (pt. Location): Home    Distant Location (provider location):  On-site  Platform used for Video Visit: [facility]      Palliative Care Outpatient Clinic      Patient ID/Chief Complaint: Walter Reyes 63 year old male who is presenting to the palliative medicine clinic today at the request of Yamilet Espinoza PA-C for a palliative care follow-up secondary to metastatic prostate cancer.   The patient's primary care provider is:  Poncho Ortiz       Impression & Recommendations:  63-year-old male with metastatic prostate cancer with spinal and bone metastases.  Palliative care assistance requested for symptom management of chronic cancer associated back pain with radiculopathy in the legs.    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  High risk for recurrence.    Past medical history also includes hyperlipidemia, hypothyroidism, HTN, GERD, Acosta's neuroma, plantar fasciitis, peroneal tendinitis, and mild chemotherapy-induced polyneuropathy.    Radiculopathy in both legs, has improved on gabapentin.  He has constant pain over the sacrum and to the left of the sacrum.  Pain is constant, deep, aching.  Interferes with sleep. Pain worsens when reclining or lying down. Due to progressive cancer, he is also having more rib and back pain. He is not seeking RTX at this time, rather opting for medication management.     Symptoms have been stable on current medications and cancer treatment.     Symptoms/recommendations/discussion:  Healthcare directive present in EMR. Wife Micheline is agent if needed.   Pain is well controlled on fentanyl patch 25 mcg/h.   Continue gabapentin 300 mg every morning and afternoon, 600 mg at bedtime.   Continue oxycodone IR 5-10 mg every 4 hours as needed, generally takes 1 tab at bedtime but minimal use otherwise  Counseled to monitor for  "constipation  Counseled opioid safety, Narcan nasal spray ordered  Social situation includes residing in home with wife Micheline. Stable housing and food. No h/o substance abuse.   Palliative follow-up in 3 months. He has direct phone # for our team.         How to get a hold of us:  For non-urgent matters, MyChart works best.    For more urgent matters, or if you prefer not to use MyChart, call our clinic nurse coordinator Sherry Palma RN at [phone] or [phone]    We have an on-call number for evenings and weekends. Please call this only if you are having uncontrolled symptoms or serious side effects from your medicines: [phone].     For refills, please give us a week (5 working days) notice. We don't always have providers available everyday to do refills. If you call the day you run out of your medicine, we may not be able to refill it in time, so call 5 days in advance        History:  History gathered today from: patient, family/loved ones, medical chart, outside records including Care Everywhere      PE: Ht 1.803 m (5' 11\")   Wt 116.1 kg (256 lb)   BMI 35.70 kg/m     Wt Readings from Last 3 Encounters:   03/28/24 116.1 kg (256 lb)   03/11/24 119.3 kg (263 lb 1.6 oz)   02/19/24 121.2 kg (267 lb 4.8 oz)       Gen alert, comfortable appearing, NAD.   Head NCAT.  Eyes anicteric without injection  Face symmetric, eyes conjugate  Lungs unlabored, no cough, speaking full sentences  Skin no rashes or lesions evident on face/neck  Neuro Face symmetric, eyes conjugate; speech fluent.  Neuropsych exam normal including affect, sensorium, gross memory, thought processes, and fund of knowledge.         Data reviewed:  I reviewed electrolytes, BUN/creatinine, liver profile, hemoglobin and hematocrit, platelet count, and most recent imaging  Recent oncology notes     database reviewed: 3/28        35 minutes spent on the date of the encounter doing chart review, history and exam, patient education & counseling, " documentation and other activities as noted above.        Thank you for involving us in the patient's care.   LATESHA Seaman, [facility]iative Care [facility]

## 2024-03-28 NOTE — PATIENT INSTRUCTIONS
Thank you for meeting with us in the [facility]iative [facility].    How to get a hold of us:  For non-urgent matters, MyChart works best.    For more urgent matters, or if you prefer not to use MyChart, call our clinic nurse coordinator Sherry Palma RN at [phone] or [phone]    We have an on-call number for evenings and weekends. Please call this only if you are having uncontrolled symptoms or serious side effects from your medicines: [phone].     For refills, please give us a week (5 working days) notice. We don't always have providers available everyday to do refills. If you call the day you run out of your medicine, we may not be able to refill it in time, so call 5 days in advance!

## 2024-03-29 ENCOUNTER — DOCUMENTATION ONLY (OUTPATIENT)
Dept: OTHER | Facility: CLINIC | Age: 64
End: 2024-03-29

## 2024-03-29 NOTE — PATIENT INSTRUCTIONS
Quality 130: Documentation Of Current Medications In The Medical Record: Current Medications Documented Thank you for choosing [facility]!    DR. HOLGUIN'S CLINIC:  [facility]   [address]    [address]   [place] [address]   [phone] / [phone]      SCHEDULE SURGERY: [phone]   BILLING QUESTIONS: [phone]   AFTER HOURS: [phone]   APPOINTMENTS: [phone]   CONSUMER PRICE LINE: [phone]      Follow up: as needed.  Diagnosis: plantar fasciitis and tendonitis right, mortons neuroma left foot.  Next steps: physical therapy.    Please read through the following handouts and if you have any questions, please feel free to call us or send a [employer] message!    PLANTAR FASCIITIS  Plantar fasciitis is often referred to as heel spurs or heel pain. Plantar fasciitis is a very common problem that affects people of all foot shapes, age, weight and activity level. Pain may be in the arch or on the weight-bearing surface of the heel. The pain may come on without injury or identifiable cause. Pain is generally present when first getting out of bed in the morning or up from a seated break.     CAUSES  The plantar fascia is a dense fibrous band of tissue that stretches across the bottom surface of the foot. The fascia helps support the foot muscles and arch. Plantar fasciitis is thought to be caused by mechanical strain or overload. Frequent walking without shoes or wearing unsupportive shoes is thought to cause structural overload and ultimately inflammation of the plantar fascia. Some people have heel spurs that can be seen on x-ray. The heel spur is actually a minor component of plantar fascitis and is largely ignored.       SELF TREATMENT   The easiest solution is to stop walking around your home without shoes. Plantar fasciitis is largely a shoe problem. Shoes are either not being worn often enough or your current shoes are inadequate for your weight, foot structure or activity level. The majority of shoes on the market today are not sufficient  Quality 431: Preventive Care And Screening: Unhealthy Alcohol Use - Screening: Patient not identified as an unhealthy alcohol user when screened for unhealthy alcohol use using a systematic screening method to resist development of plantar fasciitis or to promote healing. Assume that your current shoes are inadequate and will need to be replaced. Even high quality shoes wear out with 6 months to one year of frequent use. Weight loss is another option. Losing ten pounds in the next two months may be enough to resolve the problem. Ice applied to the area of pain two to three times per day for ten minutes each session can be very helpful. Warm foot soaks in epsom salts can also relieve pain. This should continue until the problem resolves. Achilles tendon stretching is essential. Stretch multiple times daily to promote healing and to prevent recurrence in the future. Over all stretching of the body is helpful as well such as the calves, thighs and lower back. Normally when one area of the body is tight, other areas are too. Gentle Yoga can be good for this.     Over the counter topical anti inflammatories can be helpful such as biofreeze, bengay, salon pas, ect...  Oral ibuprofen or aleve is recommended as well to try to calm down inflammation.     Night splints can be helpful to gradually stretch the foot at night as a lot of pain is when you get up in the morning. Taking a towel or thera band and stretching the foot back multiple times before you get ou of bed can be beneficial as well.     MEDICAL TREATMENT  Medical treatments often include custom arch supports, cortisone injections, physical therapy, splints to be worn in bed, prescription medications and surgery. The home treatments listed above will be necessary regardless of these advanced medical treatments. Surgery is rarely needed but is very helpful in selected cases.     PROGNOSIS  Plantar fasciitis can last from one day to a lifetime. Some people get intermittent fascitis that is very short-lived. Others suffer daily for years. Excessive body weight, frequent bare foot walking, long hours on the feet, inadequate shoes, predisposing foot structures and  Quality 485: Psoriasis - Improvement In Patient-Reported Itch Severity: Itch severity assessment score is reduced by 3 or more points from the initial (index) assessment score to the follow-up visit score excessive activity such as running are all potential issues that lead to chronic and/or recurring plantar fascitis. Having plantar fasciitis means that you are forever prone to this problem and will require modification of some of the above factors. Most people seek treatment within one to four months. Healing usually requires a similar one to four month time frame. Healing time is relative to the amount of effort spent treating the problem.   Plantar fasciitis is highly recurrent. Risk factors often continue, including return to bare foot walking, inadequate shoes, excessive body weight, excessive activities, etc. Your life style and foot structure may predispose you to recurrent plantar fasciitis. A daily prevention regimen can be very helpful. Ongoing use of shoe inserts, careful attention to appropriate shoes, daily Achilles stretching, etc. may prevent recurrence. Prompt attention at the earliest warning signs of heel pain can resolve the problem in as short as a few days.     EXERCISES  Stair Exercise: Step on the stairs with the ball of your foot and hold your position for at least 15 seconds, then slowly step down with the heels of your foot. You can do this daily and as often as you want.   Picking the Towel: Sit comfortably and then pick the towel up with your toes. You can use any object other than a towel as long as the material can be soft and you can pick it up with your toes.  Rolling the Bottle: Use a small ball or frozen water bottle and then roll it around with your foot.   Flex the Toes: Sit comfortably and then flex your toes by pointing it towards the floor or towards your body. This will relax and flex your foot and exercise your plantar fascia, the calf, and the Achilles tendon. The inability of the foot to stretch often causes the bunching up of the plantar fascia area leading to the pain.  Calf/Achilles Stretching: Lay on you back and raise one foot, then point your toes towards the floor.  Detail Level: Generalized See photo below:               Hold each stretch for 10 seconds. Stretch 10 times per set, three sets per day. Morning, afternoon and evening. If your heel pain is very severe in the morning, consider doing the first set of stretches before you get out of bed.      OVER THE COUNTER INSERT RECOMMENDATIONS  SuperFeet   Sofsole Fit Spenco   Power Step   Walk-Fit Arch Cradles     Most of these can be found at your local [employer], [employer], or online.  **A good high quality over the counter insert should cost around $40-$50      [facility]  [address] [facility]  [phone]   [address]  [phone] [place]  [address] [facility]  [phone]   [place]  [address]  [phone]   [place]  [address] [facility] [address]  [phone] [place]  [address] [place] [address] [place]  [phone]   [place]  [address]  [phone]   [place]  [address]  [phone] [place]  [address]  [phone]       IONTOPHORESIS    You have been prescribed iontophoresis therapy with physical therapy. Iontophoresis (a.k.a. Electromotive Drug Administration (EMDA)) is a technique using a small electric charge to deliver a medicine or other chemical through the skin. It is basically an injection without the needle. It is used by physical therapists to treat a variety of conditions. It is a type of electrical stimulation that is used to administer medication into your body through your skin. There are many different uses for iontophoresis. These include, but are not limited to:  Decrease inflammation, Decrease pain, Decrease muscle spasm, Decrease swelling and edema, Reduce calcium deposits in the body, Manage scar tissue, ect....    A typical iontophoresis treatment takes 10 to 20 minutes.      Before scheduling with physical therapy, you need to check with your insurance about coverage.  If not covered by insurance, you will need to sign a  Quality 226: Preventive Care And Screening: Tobacco Use: Screening And Cessation Intervention: Patient screened for tobacco use and is an ex/non-smoker waiver that you will pay for it if you wish to proceed with treatment.  When checking with your insurance about coverage, the billing code (CPT code) they will need to check is:  13592.    Please fill prescription at a [facility] if you have difficulty getting the medication at another pharmacy.    Different Medications:    1.  Dexamethasone Sodium Phosphate, 4mg/ml injectable, 30 cc total volume.  2.  Diclofenac Sodium 1.5 % Soln, 30cc  3.  Ketoprofen Sodium 10 - 30%  4.  Naproxen Sodium 4 8 %    TENDONITIS   Tendons are the strong fibrous portions of muscles that attach to bones and allow the muscle to move a joint when it contracts. Tendons are very strong because they have a lot of force exerted on them. Sometimes tendons can become painful because they have suffered an acute injury, in which too much force was exerted at one time, or an overuse injury, in which a normal force was exerted too frequently or over a prolonged period of time. As a result, there is damage to the tendon and its surrounding soft tissue structures and they become inflammed. Because tendons do not have a great blood supply, they do not heal rapidly and the inflammation can become chronic.   Conservative treatment for tendinitis involves rest and anti-inflammatory measures. Ice is applied 15 minutes 2-3 times daily. Anti-inflammatory medications called NSAIDs (ibuprofen, example) can be taken provided they are used with caution, as they can lead to internal bleeding and increase the risk ofstroke and heart attack. Sometimes topical nitroglycerin is prescribed to help with pain. Often your doctor will use a special shoe or removable walking cast to immobilize the tendon, allowing it to heal without further damage from use. These devices are very useful in helping tendons heal, but they may slow you down or make you feel like your hip, knee, or back are out ofalignment. This is temporary and should go away once you are out ofthe  immobilization. You should not use a walking cast when showering or driving. Another option is Platelet Rich Plasma injections. (Normally done with a Sports and Orthorapedic doctor.   If conservative measures fail, your physician may need to surgically repair the tendon by removing any chronic inflammatory tissue and sewing it back together. Sometimes it is sewn to an adjacent tendon with similar function for support and sometimes it is lengthened. . Sometimes the bones around the tendon need to be realigned or reshaped to better support the tendon or prevent further damage. Your foot and ankle surgeon will discuss the specifics of your surgery with you, should you need it.      Towel stretch: Sit on a hard surface with your injured leg stretched out in front of you. Loop a towel around your toes and the ball of your foot and pull the towel toward your body keeping your leg straight. Hold this position for 15 to 30 seconds and then relax. Repeat 3 times. Then push the towel away with the ball of your foot. Repeat 3 times.  When you don't feel much of a stretch using the towel, you can start the standing calf stretch and the following exercises.    Standing calf stretch: Stand facing a wall with your hands on the wall at about eye level. Keep your injured leg back with your heel on the floor. Keep the other leg forward with the knee bent. Turn your back foot slightly inward (as if you were pigeon-toed). Slowly lean into the wall until you feel a stretch in the back of your calf. Hold the stretch for 15 to 30 seconds. Return to the starting position. Repeat 3 times. Do this exercise several times each day.     Standing soleus stretch: Stand facing a wall with your hands on the wall at about chest height. Keep your injured leg back with your heel on the floor. Keep the other leg forward with the knee bent. Turn your back foot slightly inward (as if you were pigeon-toed). Bend your back knee slightly and gently lean  into the wall until you feel a stretch in the lower calf of your injured leg. Hold the stretch for 15 to 30 seconds. Return to the starting position. Repeat 3 times.     Achilles stretch: Stand with the ball of one foot on a stair. Reach for the step below with your heel until you feel a stretch in the arch of your foot. Hold this position for 15 to 30 seconds and then relax. Repeat 3 times.     Heel raise: Balance yourself while standing behind a chair or counter. Using the chair or counter as a support to help you, raise your body up onto your toes and hold for 5 seconds. Then slowly lower yourself down without holding onto the support. (It's OK to keep holding onto the support if you need to.) When this exercise becomes less painful, try lowering yourself down on the injured leg only. Repeat 15 times. Do 2 sets of 15. Rest 30 seconds between sets.     Step-up: Stand with the foot of your injured leg on a support 3 to 5 inches high (like a small step or block of wood). Keep your other foot flat on the floor. Shift your weight onto the injured leg on the support. Straighten your injured leg as the other leg comes off the floor. Return to the starting position by bending your injured leg and slowly lowering your uninjured leg back to the floor. Do 2 sets of 15.     Resisted ankle eversion: Sit with both legs stretched out in front of you, with your feet about a shoulder's width apart. Tie a loop in one end of elastic tubing. Put the foot of your injured leg through the loop so that the tubing goes around the arch of that foot and wraps around the outside of the other foot. Hold onto the other end of the tubing with your hand to provide tension. Turn the foot of your injured leg up and out. Make sure you keep your other foot still so that it will allow the tubing to stretch as you move the foot of your injured leg. Return to the starting position. Do 2 sets of 15.     Balance and reach exercises: Stand next to a  chair with your injured leg farther from the chair. The chair will provide support if you need it. Stand on the foot of your injured leg and bend your knee slightly. Try to raise the arch of this foot while keeping your big toe on the floor. Keep your foot in this position. With the hand that is farther away from the chair, reach forward in front of you by bending at the waist. Avoid bending your knee any more as you do this. Repeat this 10 times. To make the exercise more challenging, reach farther in front of you. Do 2 sets of 10.  the same position as above. While keeping your arch height, reach the hand that is farther away from the chair across your body toward the chair. The farther you reach, the more challenging the exercise. Do 2 sets of 10.     Resisted ankle eversion: Sit with both legs stretched out in front of you, with your feet about a shoulder's width apart. Tie a loop in one end of elastic tubing. Put the foot of your injured leg through the loop so that the tubing goes around the arch of that foot and wraps around the outside of the other foot. Hold onto the other end of the tubing with your hand to provide tension. Turn the foot of your injured leg up and out. Make sure you keep your other foot still so that it will allow the tubing to stretch as you move the foot of your injured leg. Return to the starting position. Do 2 sets of 15.   If you have access to a wobble board, do the following exercises:  Wobble board exercises:     Stand on a wobble board with your feet shoulder width apart. Rock the board forwards and backwards 30 times, then side to side 30 times. Hold on to a chair if you need support.     Rotate the wobble board around so that the edge of the board is in contact with the floor at all times. Do this 30 times in a clockwise and then a counterclockwise direction.     Balance on the wobble board for as long as you can without letting the edges touch the floor. Try to do this  for 2 minutes without touching the floor.     Rotate the wobble board in clockwise and counterclockwise circles, but do not let the edge of the board touch the floor.     When you have mastered exercises A through D, try repeating them while standing on just your injured leg.     After you are able to do these exercises on one leg, try to do them with your eyes closed. Make sure you have something nearby to support you in case you lose your balance.  ACOSTA'S NEUROMA   Acosta's neuroma is an enlargement or thickening of a nerve in the foot. It is also sometimes referred to as an intermetatarsal neuroma, interdigital neuroma, Acosta's metatarsalgia (pain in the metatarsal head area), judy-neural fibrosis (scar tissue around a nerve) or entrapment neuropathy (abnormal nerve due to compression). A Acosta's neuroma most commonly occurs in the third interspace between the third and fourth toes, followed by the second interspace between the second and third toes. Acosta's neuromas have also occurred in the fourth and first interspaces, but these are rare. If you have a Acosta's neuroma, there is a 15% chance it will occur bilaterally (on both feet). Acosta's neuromas occur most commonly in women who are between 30 to 50 years old. The reason they are more common in women is thought to be due to the shoes women wear.   CAUSES: A Acosta's neuroma is thought to be caused by trauma to the nerve, but scientists are still not sure about the exact cause of the trauma. The trauma may be caused by the metatarsal heads, the deep transverse intermetatarsal ligament (holds the metatarsal heads together) or an intermetatarsal bursa (fluid-filled sac). All of these structures can cause compression/trauma on the nerve which initially causes swelling and injury in the nerve. Over time if the compression/trauma continues, the nerve repairs itself with very fibrous tissue that leads to enlargement and thickening of the nerve. Other causes  "of trauma to the nerve may include; overpronation (foot rolls inward), hypermobility (too much motion), cavo varus (high arch foot) and excessive dorsiflexion (toes bend upward) of the toes. These biomechanical (howthe foot moves) factors may cause trauma to the nerve with every step. If the nerve becomes irritated and enlarged then it takes up more space and gets even more compressed and irritated. It becomes a vicious cycle.   SIGNS & SYMPTOMS  - Pain (sharp, stabbing, throbbing, shooting)   - Numbness   - Tingling or \"pins & needles\"   - Burning   - Cramping   - Feeling that you are stepping on something or that something is in your shoe   - Initially the symptoms may happen once in a while, but as the condition gets worse, the symptoms may happen all of the time   - It usually feels better by taking off your shoe and massaging your foot     DIAGNOSIS: Your podiatrist will ask many questions about your signs and symptoms and will perform a physical exam. Some of the exams may include a web space compression test. This is done by squeezing the metatarsals together with one hand and using the thumb and index finger of the other hand to compress the affected web space to reproduce the pain/symptoms. A palpable click (Charles's click) is usually present. This test may also cause pain to shoot into the toes and that is called a Tinel's sign. Joanna's test involves squeezing the metatarsals together and moving the toes up and down for 30 seconds. This will usually cause pain or it will bring on your other symptoms. Diaz's sign is positive when you stand and the affected toes spread apart. A Acosta's neuroma is usually diagnosed based on the history and physical exam findings, but sometimes other tests such as an x-ray, ultrasound or an MRI are needed.   TREATMENT  1.  Footwear Changes: Wear shoes that are wide and deep in the toe box so they  do not put pressure on your toes and metatarsals. Avoid wearing high " heels because they cause increased pressure on the ball of your foot (forefoot).    2.  Metatarsal Pads: These help to lift and separate the metatarsal heads to take pressure off of the nerve. They are placed just behind where you feel the pain, not on top of the painful spot.   3.  Activity modification: For example, you may try swimming instead of running until your symptoms go away.   4.  Taping   5.  Icing   6.  NSAIDs (anti-inflammatories): aleve, ibuprofen, etc.   7.  Arch Supports or Orthotics: These help to control some of the abnormal motion in your feet. The abnormal motion can lead to extra torque and pressure on the nerve.   8.  Physical Therapy  9.  Cortisone Injection: Helps to decrease the size of the irritated, enlarged nerve.   10.  Sclerosing Alcohol Injection: Helps to destroy the nerve chemically, which causes permanent numbness    SURGERY  If conservative treatment does not help surgery may be needed. Surgery may involve cutting out the nerve or cutting the intermetatarsalligament. Studies have shown surgery has an 80-85% success rate.  Will result in numbness    PREVENTION  -Avoid wearing narrow, pointed toe shoes, or high heels          Walter to follow up with Primary Care provider regarding elevated blood pressure. (if equal or greater than 140/90)    BODY WEIGHT AND YOUR FEET  The following information is included in the after visit summary for all patients. Body weight can be a sensitive issue to discuss in clinic, but we think the following information is very important. Although we focus on the feet and ankles, we do support the overall health of our patients. Many things can cause foot and ankle problems. Foot structure, activity level, foot mechanics and injuries are common causes of pain. One very important issue that often goes unmentioned, is body weight. Extra weight can cause increased stress on muscles, ligaments, bones and tendons. Sometimes just a few extra pounds is all it  takes to put one over her/his threshold. Without reducing that stress, it can be difficult to alleviate pain. As Foot & Ankle specialists, our job is addressing the lower extremity problem and possible causes. Regarding extra body weight, we encourage patients to discuss diet and weight management plans with their primary care doctors. It is this team approach that gives you the best opportunity for pain relief and getting you back on your feet. [place] has a Comprehensive Weight Management Program. This program includes counseling, education, non-surgical and surgical approaches to weight loss. If you are interested in learning more either talk to you primary care provider or call [phone].

## 2024-04-01 ENCOUNTER — APPOINTMENT (OUTPATIENT)
Dept: LAB | Facility: CLINIC | Age: 64
End: 2024-04-01
Attending: STUDENT IN AN ORGANIZED HEALTH CARE EDUCATION/TRAINING PROGRAM
Payer: COMMERCIAL

## 2024-04-01 ENCOUNTER — INFUSION THERAPY VISIT (OUTPATIENT)
Dept: ONCOLOGY | Facility: CLINIC | Age: 64
End: 2024-04-01
Attending: STUDENT IN AN ORGANIZED HEALTH CARE EDUCATION/TRAINING PROGRAM
Payer: COMMERCIAL

## 2024-04-01 VITALS
RESPIRATION RATE: 18 BRPM | DIASTOLIC BLOOD PRESSURE: 77 MMHG | SYSTOLIC BLOOD PRESSURE: 125 MMHG | OXYGEN SATURATION: 96 % | BODY MASS INDEX: 36.71 KG/M2 | TEMPERATURE: 98.9 F | HEART RATE: 60 BPM | WEIGHT: 263.2 LBS

## 2024-04-01 DIAGNOSIS — G62.0 CHEMOTHERAPY-INDUCED PERIPHERAL NEUROPATHY (H): ICD-10-CM

## 2024-04-01 DIAGNOSIS — C79.51 MALIGNANT NEOPLASM OF PROSTATE METASTATIC TO BONE (H): Primary | ICD-10-CM

## 2024-04-01 DIAGNOSIS — C61 MALIGNANT NEOPLASM OF PROSTATE METASTATIC TO BONE (H): Primary | ICD-10-CM

## 2024-04-01 DIAGNOSIS — T45.1X5A CHEMOTHERAPY-INDUCED NAUSEA: ICD-10-CM

## 2024-04-01 DIAGNOSIS — T45.1X5A CHEMOTHERAPY-INDUCED PERIPHERAL NEUROPATHY (H): ICD-10-CM

## 2024-04-01 DIAGNOSIS — R11.0 CHEMOTHERAPY-INDUCED NAUSEA: ICD-10-CM

## 2024-04-01 DIAGNOSIS — Z76.89 PREVENTION OF CHEMOTHERAPY-INDUCED NEUTROPENIA: ICD-10-CM

## 2024-04-01 LAB
ALBUMIN SERPL BCG-MCNC: 4.1 G/DL (ref 3.5–5.2)
ALP SERPL-CCNC: 82 U/L (ref 40–150)
ALT SERPL W P-5'-P-CCNC: 20 U/L (ref 0–70)
ANION GAP SERPL CALCULATED.3IONS-SCNC: 11 MMOL/L (ref 7–15)
AST SERPL W P-5'-P-CCNC: 19 U/L (ref 0–45)
BASOPHILS # BLD AUTO: 0 10E3/UL (ref 0–0.2)
BASOPHILS NFR BLD AUTO: 0 %
BILIRUB SERPL-MCNC: 0.3 MG/DL
BUN SERPL-MCNC: 12.4 MG/DL (ref 8–23)
CALCIUM SERPL-MCNC: 8.6 MG/DL (ref 8.8–10.2)
CHLORIDE SERPL-SCNC: 106 MMOL/L (ref 98–107)
CREAT SERPL-MCNC: 0.92 MG/DL (ref 0.67–1.17)
DEPRECATED HCO3 PLAS-SCNC: 24 MMOL/L (ref 22–29)
EGFRCR SERPLBLD CKD-EPI 2021: >90 ML/MIN/1.73M2
EOSINOPHIL # BLD AUTO: 0 10E3/UL (ref 0–0.7)
EOSINOPHIL NFR BLD AUTO: 0 %
ERYTHROCYTE [DISTWIDTH] IN BLOOD BY AUTOMATED COUNT: 20.4 % (ref 10–15)
GLUCOSE SERPL-MCNC: 94 MG/DL (ref 70–99)
HCT VFR BLD AUTO: 33.2 % (ref 40–53)
HGB BLD-MCNC: 10.7 G/DL (ref 13.3–17.7)
IMM GRANULOCYTES # BLD: 0 10E3/UL
IMM GRANULOCYTES NFR BLD: 1 %
LYMPHOCYTES # BLD AUTO: 0.9 10E3/UL (ref 0.8–5.3)
LYMPHOCYTES NFR BLD AUTO: 20 %
MCH RBC QN AUTO: 31.5 PG (ref 26.5–33)
MCHC RBC AUTO-ENTMCNC: 32.2 G/DL (ref 31.5–36.5)
MCV RBC AUTO: 98 FL (ref 78–100)
MONOCYTES # BLD AUTO: 0.7 10E3/UL (ref 0–1.3)
MONOCYTES NFR BLD AUTO: 14 %
NEUTROPHILS # BLD AUTO: 3 10E3/UL (ref 1.6–8.3)
NEUTROPHILS NFR BLD AUTO: 65 %
NRBC # BLD AUTO: 0 10E3/UL
NRBC BLD AUTO-RTO: 0 /100
PLATELET # BLD AUTO: 160 10E3/UL (ref 150–450)
POTASSIUM SERPL-SCNC: 3.9 MMOL/L (ref 3.4–5.3)
PROT SERPL-MCNC: 6.3 G/DL (ref 6.4–8.3)
PSA SERPL DL<=0.01 NG/ML-MCNC: 1.74 NG/ML (ref 0–4.5)
RBC # BLD AUTO: 3.4 10E6/UL (ref 4.4–5.9)
SODIUM SERPL-SCNC: 141 MMOL/L (ref 135–145)
WBC # BLD AUTO: 4.6 10E3/UL (ref 4–11)

## 2024-04-01 PROCEDURE — 250N000011 HC RX IP 250 OP 636

## 2024-04-01 PROCEDURE — 82374 ASSAY BLOOD CARBON DIOXIDE: CPT

## 2024-04-01 PROCEDURE — 99213 OFFICE O/P EST LOW 20 MIN: CPT | Mod: 25

## 2024-04-01 PROCEDURE — 36591 DRAW BLOOD OFF VENOUS DEVICE: CPT

## 2024-04-01 PROCEDURE — 96413 CHEMO IV INFUSION 1 HR: CPT

## 2024-04-01 PROCEDURE — 85025 COMPLETE CBC W/AUTO DIFF WBC: CPT

## 2024-04-01 PROCEDURE — 96367 TX/PROPH/DG ADDL SEQ IV INF: CPT

## 2024-04-01 PROCEDURE — 96377 APPLICATON ON-BODY INJECTOR: CPT

## 2024-04-01 PROCEDURE — 96417 CHEMO IV INFUS EACH ADDL SEQ: CPT

## 2024-04-01 PROCEDURE — 258N000003 HC RX IP 258 OP 636

## 2024-04-01 PROCEDURE — 99215 OFFICE O/P EST HI 40 MIN: CPT

## 2024-04-01 PROCEDURE — 96375 TX/PRO/DX INJ NEW DRUG ADDON: CPT

## 2024-04-01 PROCEDURE — 96372 THER/PROPH/DIAG INJ SC/IM: CPT

## 2024-04-01 PROCEDURE — 84153 ASSAY OF PSA TOTAL: CPT

## 2024-04-01 PROCEDURE — 84155 ASSAY OF PROTEIN SERUM: CPT

## 2024-04-01 RX ORDER — HEPARIN SODIUM,PORCINE 10 UNIT/ML
5-20 VIAL (ML) INTRAVENOUS DAILY PRN
Status: CANCELLED | OUTPATIENT
Start: 2024-04-01

## 2024-04-01 RX ORDER — PALONOSETRON 0.05 MG/ML
0.25 INJECTION, SOLUTION INTRAVENOUS ONCE
Status: COMPLETED | OUTPATIENT
Start: 2024-04-01 | End: 2024-04-01

## 2024-04-01 RX ORDER — LORAZEPAM 2 MG/ML
0.5 INJECTION INTRAMUSCULAR EVERY 4 HOURS PRN
Status: CANCELLED | OUTPATIENT
Start: 2024-04-01

## 2024-04-01 RX ORDER — HEPARIN SODIUM (PORCINE) LOCK FLUSH IV SOLN 100 UNIT/ML 100 UNIT/ML
5 SOLUTION INTRAVENOUS ONCE
Status: COMPLETED | OUTPATIENT
Start: 2024-04-01 | End: 2024-04-01

## 2024-04-01 RX ORDER — ALBUTEROL SULFATE 0.83 MG/ML
2.5 SOLUTION RESPIRATORY (INHALATION)
Status: CANCELLED | OUTPATIENT
Start: 2024-04-01

## 2024-04-01 RX ORDER — EPINEPHRINE 1 MG/ML
0.3 INJECTION, SOLUTION INTRAMUSCULAR; SUBCUTANEOUS EVERY 5 MIN PRN
Status: CANCELLED | OUTPATIENT
Start: 2024-04-01

## 2024-04-01 RX ORDER — DIPHENHYDRAMINE HYDROCHLORIDE 50 MG/ML
50 INJECTION INTRAMUSCULAR; INTRAVENOUS
Status: CANCELLED
Start: 2024-04-01

## 2024-04-01 RX ORDER — PALONOSETRON 0.05 MG/ML
0.25 INJECTION, SOLUTION INTRAVENOUS ONCE
Status: CANCELLED | OUTPATIENT
Start: 2024-04-01

## 2024-04-01 RX ORDER — METHYLPREDNISOLONE SODIUM SUCCINATE 125 MG/2ML
125 INJECTION, POWDER, LYOPHILIZED, FOR SOLUTION INTRAMUSCULAR; INTRAVENOUS
Status: CANCELLED
Start: 2024-04-01

## 2024-04-01 RX ORDER — PREDNISONE 5 MG/1
5 TABLET ORAL 2 TIMES DAILY WITH MEALS
Qty: 42 TABLET | Refills: 0 | Status: SHIPPED | OUTPATIENT
Start: 2024-04-01 | End: 2024-04-22

## 2024-04-01 RX ORDER — HEPARIN SODIUM (PORCINE) LOCK FLUSH IV SOLN 100 UNIT/ML 100 UNIT/ML
5 SOLUTION INTRAVENOUS
Status: CANCELLED | OUTPATIENT
Start: 2024-04-01

## 2024-04-01 RX ORDER — MEPERIDINE HYDROCHLORIDE 25 MG/ML
25 INJECTION INTRAMUSCULAR; INTRAVENOUS; SUBCUTANEOUS EVERY 30 MIN PRN
Status: CANCELLED | OUTPATIENT
Start: 2024-04-01

## 2024-04-01 RX ORDER — ALBUTEROL SULFATE 90 UG/1
1-2 AEROSOL, METERED RESPIRATORY (INHALATION)
Status: CANCELLED
Start: 2024-04-01

## 2024-04-01 RX ADMIN — Medication 5 ML: at 09:31

## 2024-04-01 RX ADMIN — PEGFILGRASTIM 6 MG: KIT SUBCUTANEOUS at 13:22

## 2024-04-01 RX ADMIN — DEXAMETHASONE SODIUM PHOSPHATE: 10 INJECTION, SOLUTION INTRAMUSCULAR; INTRAVENOUS at 11:06

## 2024-04-01 RX ADMIN — SODIUM CHLORIDE 250 ML: 9 INJECTION, SOLUTION INTRAVENOUS at 10:53

## 2024-04-01 RX ADMIN — PALONOSETRON HYDROCHLORIDE 0.25 MG: 0.25 INJECTION INTRAVENOUS at 10:44

## 2024-04-01 RX ADMIN — CARBOPLATIN 550 MG: 10 INJECTION, SOLUTION INTRAVENOUS at 12:45

## 2024-04-01 RX ADMIN — DIPHENHYDRAMINE HYDROCHLORIDE 25 MG: 50 INJECTION, SOLUTION INTRAMUSCULAR; INTRAVENOUS at 10:54

## 2024-04-01 RX ADMIN — FAMOTIDINE 20 MG: 10 INJECTION INTRAVENOUS at 10:40

## 2024-04-01 RX ADMIN — SODIUM CHLORIDE 49 MG: 9 INJECTION, SOLUTION INTRAVENOUS at 11:34

## 2024-04-01 ASSESSMENT — PAIN SCALES - GENERAL: PAINLEVEL: NO PAIN (0)

## 2024-04-01 NOTE — PROGRESS NOTES
Infusion Nursing Note:  Walter PUENTES Leobardo Delgadohi presents today for C3D1 Cabazitaxel, Carboplatin, Neulasta onpro.    Patient seen by provider today: Yes: Yamilet Espinoza   present during visit today: Not Applicable.    Note: Patient arrives in infusion post provider visit. No new complaints or concerns. Patient denies having pain today.      Intravenous Access:  Implanted Port.    Treatment Conditions:  Lab Results   Component Value Date    HGB 10.7 (L) 04/01/2024    WBC 4.6 04/01/2024    ANEU 5.0 07/07/2021    ANEUTAUTO 3.0 04/01/2024     04/01/2024        Lab Results   Component Value Date     04/01/2024    POTASSIUM 3.9 04/01/2024    MAG 1.9 08/25/2023    CR 0.92 04/01/2024    BETHANY 8.6 (L) 04/01/2024    BILITOTAL 0.3 04/01/2024    ALBUMIN 4.1 04/01/2024    ALT 20 04/01/2024    AST 19 04/01/2024       Results reviewed, labs MET treatment parameters, ok to proceed with treatment.      Post Infusion Assessment:  Patient tolerated infusion without incident.  Blood return noted pre and post infusion.  Site patent and intact, free from redness, edema or discomfort.  No evidence of extravasations.  Access discontinued per protocol.     ONPRO  Was placed on patient's: right side of abdomen.    Was placed at 130 PM    Podpal used: Yes    ONPRO injector device Lot number: [id number]    Patient education included: all patients questions answered.    Patient tolerated administration well.      Discharge Plan:   Prescription refills given for Prednisone.  Discharge instructions reviewed with: Patient.  Patient and/or family verbalized understanding of discharge instructions and all questions answered.  AVS to patient via My[employer].  Patient will return 4/22/2024 for next appointment.   Patient discharged in stable condition accompanied by: self.  Departure Mode: Ambulatory.      Len Luna RN

## 2024-04-01 NOTE — NURSING NOTE
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 Port accessed with 20g 3/4\" power needle and vitals WNL. Flushed with saline and heparin. Pt tolerated well. Patient checked into next appointment.       Initial Vitals: /77 (BP Location: Right arm, Patient Position: Sitting, Cuff Size: Adult Large)   Pulse 60   Temp 98.9  F (37.2  C) (Oral)   Resp 18   Wt 119.4 kg (263 lb 3.2 oz)   SpO2 96%   BMI 36.71 kg/m   Estimated body mass index is 36.71 kg/m  as calculated from the following:    Height as of 3/28/24: 1.803 m (5' 11\").    Weight as of this encounter: 119.4 kg (263 lb 3.2 oz). Body surface area is 2.45 meters squared.  No Pain (0) Comment: Data Unavailable   No LMP for male patient.  Allergies reviewed: Yes  Medications reviewed: Yes    Medications: Medication refills not needed today.  Pharmacy name entered into EPIC:    [facility] [place], [place] - [address] [facility] - [place], [place] - [address] [facility] [place], [place] - [phone]  [facility] [place], [place] - [address] [address]    Frailty Screening:   Is the patient here for a new oncology consult visit in cancer care? 2. No      Clinical concerns: none       Marika Acosta            "

## 2024-04-01 NOTE — NURSING NOTE
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 Port accessed with 20g 3/4\" power needle and vitals WNL. Flushed with saline and heparin. Pt tolerated well. Patient checked into next appointment.       Evangelina Miguel RN    "

## 2024-04-01 NOTE — PROGRESS NOTES
"    [facility] Oncology Followup  Apr 1, 2024    REASON FOR VISIT: Follow-up for metastatic castration-resistant prostate cancer.      INTERVAL HISTORY:   Len returns to clinic in consideration of cycle 3 carboplatin/cabazitaxel.  He reports that cycle 2 went under the cycle 1.  He received his chemotherapy today 3/11/2024 and by the following Thursday 3/14/24 approximately 3 days after his infusion he started to feel \"sick\" with fatigue deconditioning. By that  Friday 3/15 he started to feel better and this continued into week 2 with some good and some bad days.  He had mild nausea the first week after chemotherapy and improved with Compazine as needed.  He has been avoiding Zofran as this causes constipation for him.  Finds that it is with the first week that his stamina is low he does have some more shortness of breath with exertion but none at rest.  This improves as his stamina improves throughout the second and third week.  He has some neuropathy that does seem to be increasing slowly in his toes particularly his big toes on each foot.  He is having more difficulties with toe yoga but is not having any bowel changes overall.  Occasional tingling in his fingertips but less so compared to his toes. He has tinnitus that is long standing, this is more pronounced the first 1 week after his infusion but then improves back to baseline. Appetite is mildly decreased, overall feeling less hungry. He thought further about receiving lupron/zometa on the same day as his next infusion and has decided same day is his preference. No chest pain or cough. No fevers or chills.     Review of Systems:   ROS: 10 point ROS neg other than the symptoms noted above in the HPI.    ONCOLOGY HISTORY: Mr. Walter Reyes is a 64 year old gentleman with a metastatic castration-sensitive prostate cancer and incidentally diagnosed stage 3 clear cell RCC (s/p radical R nephrectomy on 3/19/19) which is now 4.5+ years post-therapy " "without evidence of recurrence. His oncologic history is detailed below.     1. 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 Naylor at [facility]. Prior PSA were 1.4 on 8/16/17, 2.4 on 6/28/16, 2.9 on 6/28/16, and as low as 0.4 on 3/26/2003.   - 1/08/2019: Consultation with Sarah Wilson CNP in Urology clinic. Repeat PSA 9.6.  - 1/16/2019: MRI prostate with contrast - \"This examination is characterized as PIRADS 5- very high probability. Clinically significant cancer is highly likely to be present. There is a large, invasive mass arising from the right peripheral zone and extending into the neurovascular bundle, seminal vesicle, and along the anterolateral right mesorectal fascia. Metastatic right external iliac lymph node. Metastatic lesion in the posterior/superior right acetabulum.\"  - 1/25/2019: CT abdomen and pelvis with IV contrast - \"1. Heterogeneous enhancing mass posteriorly in the upper pole of the right kidney measures 4.5 x 5.8 x 5.7 cm (AP by transverse by craniocaudal). It has a small nodular component extending posterior medially which abuts the right psoas muscle. This nodular extension measures 2.1 x 2.1 cm. Minimal stranding about the mass. No definite thrombus within the right renal vein. This renal mass is compatible with a renal cell carcinoma. A paraaortic lymph node situated immediately posterior to the left renal vein measures 1.1 cm in short axis, suspicious for metastasis. 2. A 2 cm right external iliac lymph node is also suspicious for metastases. 3. Multiple sclerotic osseous lesions suspicious for metastases. These include 0.8 and 0.6 cm sclerotic lesions laterally in the right iliac wing (images 53 and 62 respectively). Ill-defined groundglass density laterally in the right acetabulum measuring 1.7 x 1.2 cm " "corresponds with the lesion identified on MRI. A 0.4 cm groundglass density in the left acetabulum. Sclerotic lesion in the left femoral neck measures 0.9 x 1.6 cm (image 81). Sclerotic metastases would be more compatible with prostate metastases. 4. A 3 subcentimeter hepatic lesions are indeterminate. Metastases would be a consideration. These can be further characterized with liver MRI.\"  - 1/25/2019: NM bone scan - \"There is focal bony uptake in the left femoral neck, right acetabulum, right of midline at the S1 or L5 level of the spine, within multiple bilateral ribs, in the C7 or T1 level of the spine, and anteriorly within the skull. Findings are suspicious for metastases.\"  - 1/31/19: CT chest with contrast - \" No suspicious nodules in the chest.  Stable appearance of a 5.8 cm right renal mass concerning for renal carcinoma until proven otherwise.  Stable indeterminant subcentimeter hypodensities in the liver.  Multifocal osteoblastic metastasis including 2.5 cm lesion in the right fifth rib posteriorly and a 1.6 cm lesion in the right third rib posteriorly. No lytic lesions identified.\"  - 2/6/19: CT-guided right sclerotic fifth rib lesion biopsy - \"Metastatic carcinoma, consistent with prostate primary.  Immunohistochemical stains performed show the metastatic carcinoma stains positive for NKX3.1 (prostate marker), negative for STACIE 3 and PAX8, which supports the above diagnosis.\"  - 2/15/19: Started Casodex 50mg every day and consented for the biobanking protocol. 3/18/19 - stopped Casodex.  - 2/19/19: Case discussed in tumor board - recommendation for nephectomy for suspected malignant right renal mass.   - 2/26/19: Started Lupron 22.5mg every 3 months.   - 5/8/19: Started Docetaxel 75mg/m2 IV every 3 weeks. 06/18/19 - cycle 3, 7/10/19 - cycle 4, 07/31/19 - cycle 5, 08/21/19 - cycle 6.   - 10/2/19: Restaging CT CAP and NM Bone Scan showed improved osseous disease, no evidence of recurrent or new " "metastatic disease.  - 1/5/20: Restaging CT CAP and NM Bone Scan showed stable osseous disease, no evidence of recurrent or new metastatic disease.  - 4/6/20: NM bone scan with slightly improved uptake in known skeletal mets. CT C/A/P with contrast with stable osseous mets, no yusuf enlargement, no new visceral mets etc.  - 04/08/20: Zometa every 3 months.  - 10/5/20: CT C/A/P with contrast and NM bone scan - SD.   - 1/4/21: CT C/A/P with contrast and NM bone scan - SD but slight increase in right 5th rib tracer uptake and in posterior L5 sclerosis. 1/6/21  PSA = 0.29  - 03/29/21: CT C/A/P with contrast - single new right sacral 8mm bone lesion (suspicious), other bone mets stable. No visceral/yusuf disease. Nephrectomy site without recurrence. NM bone scan - SD but \"increased uptake associated with the prior lesions of the lower  cervical spine, posterior right fourth rib and right L5 posterior arch elements. Otherwise unchanged uptake associated with the proximal left femur and left anterior fourth rib. Similar uptake of the left halux, possibly secondary to degenerative osteoarthritis or gout.\"    3/31/21 PSA = 0.44  7/7/21  PSA = 0.47  11/2021 PSA = 1.05  -- Start XTANDI  12/16/21 PSA =0.22  2/11/22 PSA= 0.50  3/22/22 PSA=0.72  5/5/2022 PSA=1.79  7/11/2022 PSA=2.16 --Start cycle 1 docetaxel   8/22/22 PSA = 1.36  9/12/22 PSA = 1.09  10/24/22 Cycle #6 of Docetaxel. End of treatment  1/9/23  PSA =0.66  3/20/23  PSA=1.54  4/21/23 PSA = 1.81  T=15  5/22/23 C1 Pluvicto   06/07/23          PSA = 1.42  7/18/23 PSA=1.75, C2 Pluvicto   8/28/23 Transfer of care initial visit for Zorko.  PSA 2.06.  C3 Pluvicto scheduled for 8/30.  Proceed with dose as planned.  MR L femur, ortho referral, PSMA PET ordered for prior to follow-up.    10/2/23 Palliative RT to L femur metastasis complete.  PSMA PET with mixed response.  PSA 2.21.  RT to L femur pending.  Continue with Dose #4 of Pluvicto despite mixed response to therapy.  Dex " "course for possible pain flare with RT.    11/8/23  Follow-up prior to Dose #5 Pluvicto.  More pain in chest/ribs/back. PSA 4.38.  Testosterone=3.  Rad onc referral for ribs.  Biopsy progressive lesion for progression on Pluvicto.  Cabazitaxel scheduled for follow-up appointment.  Tempus blood testing unrevealing for targetable mutation.    11/27/23 Bx completed from R iliac crest but unrevealing for either PCa or RCC.  Start cabazitaxel C1D1.  PSA 2.81.    12/19/23 PSA = 3.66  1/8/24 PSA = 3.55  1/29/24 PSA = 3.07  2/19/24 PSA= 2.21-Start carboplatin/cabazitaxel.    3/11/24 PSA= C2 Carbo/cabazi.  PSA 1.99.    4/01/24 PSA is pending. C3 carbo/cabazi       Radiation history:   -C7         3,000 cGy       06 X      8/05/2021        8/18/2021        13       10  -2 Sacrum          2,500 cGy       18 X      4/12/2022        4/18/2022         6           -Sacrum retreat               700 cGy        18 X      2/28/2023        2/28/2023  -Left femur to 2000 cGy in 5 fractions, completed 10/10/2023   -bilateral posterior chest wall at an area of osseous involvement of the ribs and adjacent T4 and 5 spine, to be delivered to 2000 cGy in 5 fractions.  completed 12/13-12/19/23.      2. Stage III ([id number]), grade 3 of 4, clear-cell RCC of right kidney:  - Incidentally diagnosed as above.   - Underwent curative-intent robotic right radical nephrectomy with Dr. Wesley Cleveland on 3/19/19. No tumor spillage per op report.   - Path showed: \"Histologic Type: Clear cell renal cell carcinoma; Sarcomatoid Features: Not identified; Histologic Grade: Nucleolar grade 3 (WHO/ISUP). Extent Tumor Size: 4.3 cm. Microscopic Tumor Extension: Tumor extension into renal sinus (in vascular structures). Margins: Negative. Tumor Necrosis: Present; focal. Lymph-Vascular Invasion: Not identified.  Pathologic Staging (pTNM) Primary Tumor (pT): pT3a: Tumor extends into renal vein branches/renal sinus. Regional Lymph Nodes (pN): pNX. Number of Lymph " "Nodes Examined: 0 Distant Metastasis (pM): pM N/A.\"  - Restaging scans as above.  No current concerns for recurrence.      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 Take with food. (Patient not taking: Reported on 3/28/2024), Disp: 6 tablet, Rfl: 2    dexAMETHasone (DECADRON) 4 MG tablet, Take 1 tablet (4 mg) by mouth daily (Patient not taking: Reported on 3/28/2024), Disp: 7 tablet, Rfl: 2    fentaNYL (DURAGESIC) 25 mcg/hr 72 hr patch, Place 1 patch onto the skin every 72 hours remove old patch., Disp: 10 patch, Rfl: 0    gabapentin (NEURONTIN) 300 MG capsule, TAKE ONE CAPSULE BY MOUTH TWICE DAILY AND 2 CAPSULES AT BEDTIME, Disp: 120 capsule, Rfl: 3    levothyroxine (SYNTHROID/LEVOTHROID) 112 MCG tablet, Take 112 mcg by mouth daily, Disp: , Rfl:     lidocaine, viscous, (XYLOCAINE) 2 % solution, Swish and swallow 15 mLs in mouth every 3 hours as needed for pain (before meals and at bedtime) ; Max 8 doses/24 hour period. (Patient not taking: Reported on 3/28/2024), Disp: 100 mL, Rfl: 1    loratadine (CLARITIN) 10 MG tablet, Take 10 mg by mouth daily, Disp: , Rfl:     Multiple Vitamins-Minerals (MULTIVITAMIN ADULT EXTRA C PO), Take 1 tablet by mouth every 24 hours, Disp: , Rfl:     naloxone (NARCAN) 4 MG/0.1ML nasal spray, Spray 1 spray (4 " mg) into one nostril alternating nostrils as needed for opioid reversal every 2-3 minutes until assistance arrives, Disp: 0.2 mL, Rfl: 1    Nutritional Supplements (SALMON OIL) CAPS, Take 2 capsules by mouth daily , Disp: , Rfl:     omeprazole (PRILOSEC) 20 MG DR capsule, Take 20 mg by mouth daily, Disp: , Rfl:     ondansetron (ZOFRAN) 8 MG tablet, Take 1 tablet (8 mg) by mouth every 8 hours as needed for nausea (vomiting), Disp: 30 tablet, Rfl: 2    ondansetron (ZOFRAN) 8 MG tablet, Take 1 tablet (8 mg) by mouth every 8 hours as needed for nausea, Disp: 30 tablet, Rfl: 4    oxyCODONE (ROXICODONE) 5 MG tablet, Take 1-2 tablets (5-10 mg) by mouth every 3 hours as needed for severe pain, Disp: 90 tablet, Rfl: 0    predniSONE (DELTASONE) 5 MG tablet, Take 1 tablet (5 mg) by mouth 2 times daily (with meals) for 21 days, Disp: 42 tablet, Rfl: 0    prochlorperazine (COMPAZINE) 10 MG tablet, Take 1 tablet (10 mg) by mouth every 6 hours as needed for nausea or vomiting, Disp: 30 tablet, Rfl: 2    tamsulosin (FLOMAX) 0.4 MG capsule, Take 1 capsule (0.4 mg) by mouth daily, Disp: 30 capsule, Rfl: 3    triamterene-HCTZ (MAXZIDE-25) 37.5-25 MG tablet, Take 1 tablet by mouth daily (Patient not taking: Reported on 12/19/2023), Disp: , Rfl:   No current facility-administered medications for this visit.      Physical Exam:   /77 (BP Location: Right arm, Patient Position: Sitting, Cuff Size: Adult Large)   Pulse 60   Temp 98.9  F (37.2  C) (Oral)   Resp 18   Wt 119.4 kg (263 lb 3.2 oz)   SpO2 96%   BMI 36.71 kg/m    Wt Readings from Last 10 Encounters:   04/01/24 119.4 kg (263 lb 3.2 oz)   03/28/24 116.1 kg (256 lb)   03/11/24 119.3 kg (263 lb 1.6 oz)   02/19/24 121.2 kg (267 lb 4.8 oz)   02/13/24 121.4 kg (267 lb 11.2 oz)   01/29/24 122.7 kg (270 lb 6.4 oz)   01/24/24 117.9 kg (260 lb)   01/08/24 119.6 kg (263 lb 9.6 oz)   12/20/23 117.9 kg (260 lb)   12/19/23 118.4 kg (261 lb)   General: The patient is a pleasant male  "in no acute distress.  HEENT: EOMI. Sclerae are anicteric. Mucous membranes moist without lesions or thrush.   Lymph: Neck is supple with no lymphadenopathy in the cervical or supraclavicular areas.   Heart: Regular rate and rhythm.   Lungs: Clear to auscultation bilaterally.   Abdomen: Bowel sounds present, soft, nontender with no palpable hepatosplenomegaly or masses.   Extremities: No lower extremity edema noted bilaterally.   Neuro: Cranial nerves II through XII are grossly intact.  Skin: No rashes, petechiae, or bruising noted on exposed skin.  Psych: affect bright, alert and oriented    LABORATORY DATA:  Most Recent 3 CBC's:  Recent Labs   Lab Test 04/01/24  0930 03/11/24  0849 02/19/24  1103   WBC 4.6 3.2* 4.0   HGB 10.7* 10.5* 10.5*   MCV 98 96 93    161 212   ANEUTAUTO 3.0 2.0 2.6     Most Recent 3 BMP's:  Recent Labs   Lab Test 04/01/24  0930 03/11/24  0849 02/19/24  1103    142 143   POTASSIUM 3.9 3.9 3.7   CHLORIDE 106 109* 108*   CO2 24 23 24   BUN 12.4 12.8 13.5   CR 0.92 0.87 1.05   ANIONGAP 11 10 11   BETHANY 8.6* 8.6* 8.6*   GLC 94 93 101*   PROTTOTAL 6.3* 6.0* 6.2*   ALBUMIN 4.1 3.9 3.9    Most Recent 3 LFT's:  Recent Labs   Lab Test 04/01/24  0930 03/11/24  0849 02/19/24  1103   AST 19 17 16   ALT 20 18 15   ALKPHOS 82 82 87   BILITOTAL 0.3 0.3 0.3     I reviewed the above labs today.    PSA trend, see oncology history.      ASSESSMENT & PLAN: Mr. Reyes is a 64 year old gentleman with metastatic castration sensitive prostate adenocarcinoma as well as an incidentally diagnosed stage III clear-cell renal cell carcinoma of the right kidney (s/p radical R nephrectomy 3/19/19), who is here prior to cycle 3 cabazitaxel.      1. Metastatic castration-resistant prostate cancer, with involvement of the bones and RPLN:   - Patient met the criteria for CHAARTED \"high-volume\" metastatic hormone-sensitive prostate cancer. He opted for docetaxel in combination with ADT (per JOSEFA, " [id number], STAMPEDE). He was subsequently treated with docetaxel x6 doses in the [facility] setting and enzalutamide.  He initiated Pluvicto in May 2023 with an initial slight decline in PSA, but this began to rise just prior to Cycle 3.  Given his XR evidence of progression in L femur and PSA rise, we re-evaluated his progression with PSMA PET scan in September 2023, with note of mixed response.  Reconsented for  [facility] on 8/28/23.  Due for 3 month collection at end of November 2023.  Canceled Pluvicto Dose for November due to symptomatic progression with PSA rise. PSA is labile. Biopsy results from 11/21 are pending for evaluation of NEPC vs small cell given progression without a significant rise in PSA. Tempus peripheral blood mutation analysis is unrevealing for targetable mutations.   -Zometa next due 4/22/2024 along with Lupron.    -Palliative to manage further opiate refills. Pain is well controlled with fentanyl patch.   -Dexamethasone burst for RT and one on hand if needed for pain flares.      -Cabazitaxel started 11/27/23 given progression on Pluvicto.  Continue Zofran and Compazine as needed, patient to call clinic if these are not sufficient in controlling nausea.     -Started carbo/cabazitaxel 2/19/24 with good tolerance with expectd fatigue and mild nausea. He is experiencing grade 2 neuropathy. Continue prednisone daily while on cabazitaxel and Neulasta.    -Consider a 20% dose reduction of cabazitaxel with cycle 4 if neuropathy is worsening.     2. Pressure in chest, resolved:  Has an ongoing sensation of pressure/need to cough in his chest, primarily against his sternum. Vital signs stable, negative pulmonary physical exam. No chest pain today.     3. Bone metastases:    - Noted to have osseous metastatic disease at the time of diagnosis. S/p radiation to C spine and sacrum (Re-irradiation to sacrum).   - worsening radiculopathy down starting in the low back and radiating down the left leg in June.  MRI read was without new/acute abnormalities  - Pain continues to fluctuate but overall well controlled/managable   - pain mgmt per palliative care, pain is now well controlled with fentanyl patch.   - Encouraged to continue calcium and vitamin D supplementation; continue Zometa 4mg IV every ~3 months. Due next in January 2024, will give today, 1/29/24..   - RT to L femur 10/2023.  RT for bilateral posterior ribs 12/2023 with Dr. Albert.  - Added to trial list for [id number].       4. Stage 3, UISS-high risk ccRCC: s/p R nephrectomy   - Nephrectomy 3/19/19 and noted incidentally.  He is now 4.5 years post-op without evidence for recurrence.   - At this point, there is a minimal risk of recurrence of his RCC given the time since surgery without the use of adjuvant immunotherapy. There is no suspicious adenopathy or other features on his most recent imaging studies.    - Will continue to monitor with imaging planned for prostate cancer.      5. Sensory neuropathy, Toes >fingers. Grade 2  - monitor for worsening with cabazitaxel.   - Continue acupuncture.   - He is on gabapentin managed by palliative care. Could consider a dose increase after discussion with palliative care as well.     PLAN:   Proceed with C3 cabazitaxel/carboplatin today.   Continue prednisone daily while on chemotherapy.   Keep dexamethasone on hand for pain flares.    Return in 3 weeks for next cycle (C4) as scheduled.   Infusion for Lupron/zometa with chemo on 4/22.    Scans and Dr. Mcnair in June.     Discussed the above plan with Dr. Mcnair who is in agreement.     40 minutes spent on the date of the encounter doing chart review, review of test results, interpretation of tests, patient visit, and documentation     Yamilet Espinoza PA-C

## 2024-04-01 NOTE — LETTER
"    4/1/2024         RE: Walter Reyes  [id number]  [facility] [id number]        Dear Colleague,    Thank you for referring your patient, Walter Reyes, to the [facility]. Please see a copy of my visit note below.        [facility] Oncology Followup  Apr 1, 2024    REASON FOR VISIT: Follow-up for metastatic castration-resistant prostate cancer.      INTERVAL HISTORY:   Len returns to clinic in consideration of cycle 3 carboplatin/cabazitaxel.  He reports that cycle 2 went under the cycle 1.  He received his chemotherapy today 3/11/2024 and by the following Thursday 3/14/24 approximately 3 days after his infusion he started to feel \"sick\" with fatigue deconditioning. By that  Friday 3/15 he started to feel better and this continued into week 2 with some good and some bad days.  He had mild nausea the first week after chemotherapy and improved with Compazine as needed.  He has been avoiding Zofran as this causes constipation for him.  Finds that it is with the first week that his stamina is low he does have some more shortness of breath with exertion but none at rest.  This improves as his stamina improves throughout the second and third week.  He has some neuropathy that does seem to be increasing slowly in his toes particularly his big toes on each foot.  He is having more difficulties with toe yoga but is not having any bowel changes overall.  Occasional tingling in his fingertips but less so compared to his toes. He has tinnitus that is long standing, this is more pronounced the first 1 week after his infusion but then improves back to baseline. Appetite is mildly decreased, overall feeling less hungry. He thought further about receiving lupron/zometa on the same day as his next infusion and has decided same day is his preference. No chest pain or cough. No fevers or chills.     Review of Systems:   ROS: 10 point ROS neg other than the symptoms noted " "above in the HPI.    ONCOLOGY HISTORY: Mr. 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 His oncologic history is detailed below.     1. 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 Naylor at [facility]. Prior PSA were 1.4 on 8/16/17, 2.4 on 6/28/16, 2.9 on 6/28/16, and as low as 0.4 on 3/26/2003.   - 1/08/2019: Consultation with Sarah Wilson CNP in Urology clinic. Repeat PSA 9.6.  - 1/16/2019: MRI prostate with contrast - \"This examination is characterized as PIRADS 5- very high probability. Clinically significant cancer is highly likely to be present. There is a large, invasive mass arising from the right peripheral zone and extending into the neurovascular bundle, seminal vesicle, and along the anterolateral right mesorectal fascia. Metastatic right external iliac lymph node. Metastatic lesion in the posterior/superior right acetabulum.\"  - 1/25/2019: CT abdomen and pelvis with IV contrast - \"1. Heterogeneous enhancing mass posteriorly in the upper pole of the right kidney measures 4.5 x 5.8 x 5.7 cm (AP by transverse by craniocaudal). It has a small nodular component extending posterior medially which abuts the right psoas muscle. This nodular extension measures 2.1 x 2.1 cm. Minimal stranding about the mass. No definite thrombus within the right renal vein. This renal mass is compatible with a renal cell carcinoma. A paraaortic lymph node situated immediately posterior to the left renal vein measures 1.1 cm in short axis, suspicious for metastasis. 2. A 2 cm right external iliac lymph node is also suspicious for " "metastases. 3. Multiple sclerotic osseous lesions suspicious for metastases. These include 0.8 and 0.6 cm sclerotic lesions laterally in the right iliac wing (images 53 and 62 respectively). Ill-defined groundglass density laterally in the right acetabulum measuring 1.7 x 1.2 cm corresponds with the lesion identified on MRI. A 0.4 cm groundglass density in the left acetabulum. Sclerotic lesion in the left femoral neck measures 0.9 x 1.6 cm (image 81). Sclerotic metastases would be more compatible with prostate metastases. 4. A 3 subcentimeter hepatic lesions are indeterminate. Metastases would be a consideration. These can be further characterized with liver MRI.\"  - 1/25/2019: NM bone scan - \"There is focal bony uptake in the left femoral neck, right acetabulum, right of midline at the S1 or L5 level of the spine, within multiple bilateral ribs, in the C7 or T1 level of the spine, and anteriorly within the skull. Findings are suspicious for metastases.\"  - 1/31/19: CT chest with contrast - \" No suspicious nodules in the chest.  Stable appearance of a 5.8 cm right renal mass concerning for renal carcinoma until proven otherwise.  Stable indeterminant subcentimeter hypodensities in the liver.  Multifocal osteoblastic metastasis including 2.5 cm lesion in the right fifth rib posteriorly and a 1.6 cm lesion in the right third rib posteriorly. No lytic lesions identified.\"  - 2/6/19: CT-guided right sclerotic fifth rib lesion biopsy - \"Metastatic carcinoma, consistent with prostate primary.  Immunohistochemical stains performed show the metastatic carcinoma stains positive for NKX3.1 (prostate marker), negative for STACIE 3 and PAX8, which supports the above diagnosis.\"  - 2/15/19: Started Casodex 50mg every day and consented for the biobanking protocol. 3/18/19 - stopped Casodex.  - 2/19/19: Case discussed in tumor board - recommendation for nephectomy for suspected malignant right renal mass.   - 2/26/19: Started Lupron " "22.5mg every 3 months.   - 5/8/19: Started Docetaxel 75mg/m2 IV every 3 weeks. 06/18/19 - cycle 3, 7/10/19 - cycle 4, 07/31/19 - cycle 5, 08/21/19 - cycle 6.   - 10/2/19: Restaging CT CAP and NM Bone Scan showed improved osseous disease, no evidence of recurrent or new metastatic disease.  - 1/5/20: Restaging CT CAP and NM Bone Scan showed stable osseous disease, no evidence of recurrent or new metastatic disease.  - 4/6/20: NM bone scan with slightly improved uptake in known skeletal mets. CT C/A/P with contrast with stable osseous mets, no yusuf enlargement, no new visceral mets etc.  - 04/08/20: Zometa every 3 months.  - 10/5/20: CT C/A/P with contrast and NM bone scan - SD.   - 1/4/21: CT C/A/P with contrast and NM bone scan - SD but slight increase in right 5th rib tracer uptake and in posterior L5 sclerosis. 1/6/21  PSA = 0.29  - 03/29/21: CT C/A/P with contrast - single new right sacral 8mm bone lesion (suspicious), other bone mets stable. No visceral/yusuf disease. Nephrectomy site without recurrence. NM bone scan - SD but \"increased uptake associated with the prior lesions of the lower  cervical spine, posterior right fourth rib and right L5 posterior arch elements. Otherwise unchanged uptake associated with the proximal left femur and left anterior fourth rib. Similar uptake of the left halux, possibly secondary to degenerative osteoarthritis or gout.\"    3/31/21 PSA = 0.44  7/7/21  PSA = 0.47  11/2021 PSA = 1.05  -- Start XTANDI  12/16/21 PSA =0.22  2/11/22 PSA= 0.50  3/22/22 PSA=0.72  5/5/2022 PSA=1.79  7/11/2022 PSA=2.16 --Start cycle 1 docetaxel   8/22/22 PSA = 1.36  9/12/22 PSA = 1.09  10/24/22 Cycle #6 of Docetaxel. End of treatment  1/9/23  PSA =0.66  3/20/23  PSA=1.54  4/21/23 PSA = 1.81  T=15  5/22/23 C1 Pluvicto   06/07/23          PSA = 1.42  7/18/23 PSA=1.75, C2 Pluvicto   8/28/23 Transfer of care initial visit for Tabby.  PSA 2.06.  C3 Pluvicto scheduled for 8/30.  Proceed with dose as " "planned.  MR L femur, ortho referral, PSMA PET ordered for prior to follow-up.    10/2/23 Palliative RT to L femur metastasis complete.  PSMA PET with mixed response.  PSA 2.21.  RT to L femur pending.  Continue with Dose #4 of Pluvicto despite mixed response to therapy.  Dex course for possible pain flare with RT.    11/8/23  Follow-up prior to Dose #5 Pluvicto.  More pain in chest/ribs/back. PSA 4.38.  Testosterone=3.  Rad onc referral for ribs.  Biopsy progressive lesion for progression on Pluvicto.  Cabazitaxel scheduled for follow-up appointment.  Tempus blood testing unrevealing for targetable mutation.    11/27/23 Bx completed from R iliac crest but unrevealing for either PCa or RCC.  Start cabazitaxel C1D1.  PSA 2.81.    12/19/23 PSA = 3.66  1/8/24 PSA = 3.55  1/29/24 PSA = 3.07  2/19/24 PSA= 2.21-Start carboplatin/cabazitaxel.    3/11/24 PSA= C2 Carbo/cabazi.  PSA 1.99.    4/01/24 PSA is pending. C3 carbo/cabazi       Radiation history:   -C7         3,000 cGy       06 X      8/05/2021        8/18/2021        13       10  -2 Sacrum          2,500 cGy       18 X      4/12/2022        4/18/2022         6           -Sacrum retreat               700 cGy        18 X      2/28/2023        2/28/2023  -Left femur to 2000 cGy in 5 fractions, completed 10/10/2023   -bilateral posterior chest wall at an area of osseous involvement of the ribs and adjacent T4 and 5 spine, to be delivered to 2000 cGy in 5 fractions.  completed 12/13-12/19/23.      2. Stage III ([id number]), grade 3 of 4, clear-cell RCC of right kidney:  - Incidentally diagnosed as above.   - Underwent curative-intent robotic right radical nephrectomy with Dr. Wesley Cleveland on 3/19/19. No tumor spillage per op report.   - Path showed: \"Histologic Type: Clear cell renal cell carcinoma; Sarcomatoid Features: Not identified; Histologic Grade: Nucleolar grade 3 (WHO/ISUP). Extent Tumor Size: 4.3 cm. Microscopic Tumor Extension: Tumor extension into renal " "sinus (in vascular structures). Margins: Negative. Tumor Necrosis: Present; focal. Lymph-Vascular Invasion: Not identified.  Pathologic Staging (pTNM) Primary Tumor (pT): pT3a: Tumor extends into renal vein branches/renal sinus. Regional Lymph Nodes (pN): pNX. Number of Lymph Nodes Examined: 0 Distant Metastasis (pM): pM N/A.\"  - Restaging scans as above.  No current concerns for recurrence.      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 Take with food. (Patient not taking: Reported on 3/28/2024), Disp: 6 tablet, Rfl: 2    dexAMETHasone (DECADRON) 4 MG tablet, Take 1 tablet (4 mg) by mouth daily (Patient not taking: Reported on 3/28/2024), Disp: 7 tablet, Rfl: 2    fentaNYL (DURAGESIC) 25 mcg/hr 72 hr patch, Place 1 patch onto the skin every 72 hours remove old patch., Disp: 10 patch, Rfl: 0    gabapentin (NEURONTIN) 300 MG capsule, TAKE ONE CAPSULE BY MOUTH TWICE DAILY AND 2 CAPSULES AT BEDTIME, Disp: 120 capsule, Rfl: 3    levothyroxine (SYNTHROID/LEVOTHROID) 112 MCG tablet, Take 112 mcg by mouth daily, Disp: , Rfl:     lidocaine, viscous, (XYLOCAINE) 2 % solution, Swish and swallow 15 mLs in mouth every 3 hours as needed for pain (before meals and at bedtime) ; Max 8 doses/24 hour period. (Patient not taking: Reported on 3/28/2024), " Disp: 100 mL, Rfl: 1    loratadine (CLARITIN) 10 MG tablet, Take 10 mg by mouth daily, Disp: , Rfl:     Multiple Vitamins-Minerals (MULTIVITAMIN ADULT EXTRA C PO), Take 1 tablet by mouth every 24 hours, Disp: , Rfl:     naloxone (NARCAN) 4 MG/0.1ML nasal spray, Spray 1 spray (4 mg) into one nostril alternating nostrils as needed for opioid reversal every 2-3 minutes until assistance arrives, Disp: 0.2 mL, Rfl: 1    Nutritional Supplements (SALMON OIL) CAPS, Take 2 capsules by mouth daily , Disp: , Rfl:     omeprazole (PRILOSEC) 20 MG DR capsule, Take 20 mg by mouth daily, Disp: , Rfl:     ondansetron (ZOFRAN) 8 MG tablet, Take 1 tablet (8 mg) by mouth every 8 hours as needed for nausea (vomiting), Disp: 30 tablet, Rfl: 2    ondansetron (ZOFRAN) 8 MG tablet, Take 1 tablet (8 mg) by mouth every 8 hours as needed for nausea, Disp: 30 tablet, Rfl: 4    oxyCODONE (ROXICODONE) 5 MG tablet, Take 1-2 tablets (5-10 mg) by mouth every 3 hours as needed for severe pain, Disp: 90 tablet, Rfl: 0    predniSONE (DELTASONE) 5 MG tablet, Take 1 tablet (5 mg) by mouth 2 times daily (with meals) for 21 days, Disp: 42 tablet, Rfl: 0    prochlorperazine (COMPAZINE) 10 MG tablet, Take 1 tablet (10 mg) by mouth every 6 hours as needed for nausea or vomiting, Disp: 30 tablet, Rfl: 2    tamsulosin (FLOMAX) 0.4 MG capsule, Take 1 capsule (0.4 mg) by mouth daily, Disp: 30 capsule, Rfl: 3    triamterene-HCTZ (MAXZIDE-25) 37.5-25 MG tablet, Take 1 tablet by mouth daily (Patient not taking: Reported on 12/19/2023), Disp: , Rfl:   No current facility-administered medications for this visit.      Physical Exam:   /77 (BP Location: Right arm, Patient Position: Sitting, Cuff Size: Adult Large)   Pulse 60   Temp 98.9  F (37.2  C) (Oral)   Resp 18   Wt 119.4 kg (263 lb 3.2 oz)   SpO2 96%   BMI 36.71 kg/m    Wt Readings from Last 10 Encounters:   04/01/24 119.4 kg (263 lb 3.2 oz)   03/28/24 116.1 kg (256 lb)   03/11/24 119.3 kg (263 lb 1.6  oz)   02/19/24 121.2 kg (267 lb 4.8 oz)   02/13/24 121.4 kg (267 lb 11.2 oz)   01/29/24 122.7 kg (270 lb 6.4 oz)   01/24/24 117.9 kg (260 lb)   01/08/24 119.6 kg (263 lb 9.6 oz)   12/20/23 117.9 kg (260 lb)   12/19/23 118.4 kg (261 lb)   General: The patient is a pleasant male in no acute distress.  HEENT: EOMI. Sclerae are anicteric. Mucous membranes moist without lesions or thrush.   Lymph: Neck is supple with no lymphadenopathy in the cervical or supraclavicular areas.   Heart: Regular rate and rhythm.   Lungs: Clear to auscultation bilaterally.   Abdomen: Bowel sounds present, soft, nontender with no palpable hepatosplenomegaly or masses.   Extremities: No lower extremity edema noted bilaterally.   Neuro: Cranial nerves II through XII are grossly intact.  Skin: No rashes, petechiae, or bruising noted on exposed skin.  Psych: affect bright, alert and oriented    LABORATORY DATA:  Most Recent 3 CBC's:  Recent Labs   Lab Test 04/01/24  0930 03/11/24  0849 02/19/24  1103   WBC 4.6 3.2* 4.0   HGB 10.7* 10.5* 10.5*   MCV 98 96 93    161 212   ANEUTAUTO 3.0 2.0 2.6     Most Recent 3 BMP's:  Recent Labs   Lab Test 04/01/24  0930 03/11/24  0849 02/19/24  1103    142 143   POTASSIUM 3.9 3.9 3.7   CHLORIDE 106 109* 108*   CO2 24 23 24   BUN 12.4 12.8 13.5   CR 0.92 0.87 1.05   ANIONGAP 11 10 11   BETHANY 8.6* 8.6* 8.6*   GLC 94 93 101*   PROTTOTAL 6.3* 6.0* 6.2*   ALBUMIN 4.1 3.9 3.9    Most Recent 3 LFT's:  Recent Labs   Lab Test 04/01/24  0930 03/11/24  0849 02/19/24  1103   AST 19 17 16   ALT 20 18 15   ALKPHOS 82 82 87   BILITOTAL 0.3 0.3 0.3     I reviewed the above labs today.    PSA trend, see oncology history.      ASSESSMENT & PLAN: Mr. Reyes is a 64 year old gentleman with metastatic castration sensitive prostate adenocarcinoma as well as an incidentally diagnosed stage III clear-cell renal cell carcinoma of the right kidney (s/p radical R nephrectomy 3/19/19), who is here prior to cycle 3  "cabazitaxel.      1. Metastatic castration-resistant prostate cancer, with involvement of the bones and RPLN:   - Patient met the criteria for CHAARTED \"high-volume\" metastatic hormone-sensitive prostate cancer. He opted for docetaxel in combination with ADT (per JOSEFA, [id number], KARLA). He was subsequently treated with docetaxel x6 doses in the [facility] setting and enzalutamide.  He initiated Pluvicto in May 2023 with an initial slight decline in PSA, but this began to rise just prior to Cycle 3.  Given his XR evidence of progression in L femur and PSA rise, we re-evaluated his progression with PSMA PET scan in September 2023, with note of mixed response.  Reconsented for  [facility] on 8/28/23.  Due for 3 month collection at end of November 2023.  Canceled Pluvicto Dose for November due to symptomatic progression with PSA rise. PSA is labile. Biopsy results from 11/21 are pending for evaluation of NEPC vs small cell given progression without a significant rise in PSA. [facility] peripheral blood mutation analysis is unrevealing for targetable mutations.   -Zometa next due 4/22/2024 along with Lupron.    -Palliative to manage further opiate refills. Pain is well controlled with fentanyl patch.   -Dexamethasone burst for RT and one on hand if needed for pain flares.      -Cabazitaxel started 11/27/23 given progression on Pluvicto.  Continue Zofran and Compazine as needed, patient to call clinic if these are not sufficient in controlling nausea.     -Started carbo/cabazitaxel 2/19/24 with good tolerance with expectd fatigue and mild nausea. He is experiencing grade 2 neuropathy. Continue prednisone daily while on cabazitaxel and Neulasta.    -Consider a 20% dose reduction of cabazitaxel with cycle 4 if neuropathy is worsening.     2. Pressure in chest, resolved:  Has an ongoing sensation of pressure/need to cough in his chest, primarily against his sternum. Vital signs stable, negative pulmonary physical exam. No " chest pain today.     3. Bone metastases:    - Noted to have osseous metastatic disease at the time of diagnosis. S/p radiation to C spine and sacrum (Re-irradiation to sacrum).   - worsening radiculopathy down starting in the low back and radiating down the left leg in June. MRI read was without new/acute abnormalities  - Pain continues to fluctuate but overall well controlled/managable   - pain mgmt per palliative care, pain is now well controlled with fentanyl patch.   - Encouraged to continue calcium and vitamin D supplementation; continue Zometa 4mg IV every ~3 months. Due next in January 2024, will give today, 1/29/24..   - RT to L femur 10/2023.  RT for bilateral posterior ribs 12/2023 with Dr. Albert.  - Added to trial list for [id number].       4. Stage 3, UISS-high risk ccRCC: s/p R nephrectomy   - Nephrectomy 3/19/19 and noted incidentally.  He is now 4.5 years post-op without evidence for recurrence.   - At this point, there is a minimal risk of recurrence of his RCC given the time since surgery without the use of adjuvant immunotherapy. There is no suspicious adenopathy or other features on his most recent imaging studies.    - Will continue to monitor with imaging planned for prostate cancer.      5. Sensory neuropathy, Toes >fingers. Grade 2  - monitor for worsening with cabazitaxel.   - Continue acupuncture.   - He is on gabapentin managed by palliative care. Could consider a dose increase after discussion with palliative care as well.     PLAN:   Proceed with C3 cabazitaxel/carboplatin today.   Continue prednisone daily while on chemotherapy.   Keep dexamethasone on hand for pain flares.    Return in 3 weeks for next cycle (C4) as scheduled.   Infusion for Lupron/zometa with chemo on 4/22.    Scans and Dr. Mcnair in June.     Discussed the above plan with Dr. Mcnair who is in agreement.     40 minutes spent on the date of the encounter doing chart review, review of test results, interpretation of tests,  patient visit, and documentation     Yamilet Espinoza PA-C

## 2024-04-03 ENCOUNTER — MYC MEDICAL ADVICE (OUTPATIENT)
Dept: PALLIATIVE MEDICINE | Facility: CLINIC | Age: 64
End: 2024-04-03

## 2024-04-03 DIAGNOSIS — C61 PROSTATE CANCER (H): ICD-10-CM

## 2024-04-03 DIAGNOSIS — G89.3 CANCER ASSOCIATED PAIN: ICD-10-CM

## 2024-04-03 RX ORDER — FENTANYL 25 UG/1
1 PATCH TRANSDERMAL
Qty: 10 PATCH | Refills: 0 | Status: SHIPPED | OUTPATIENT
Start: 2024-04-04 | End: 2024-05-09

## 2024-04-03 NOTE — TELEPHONE ENCOUNTER
Received [facility] message from patient requesting refill of fentanyl patch.     Last refill: 3/7/2024  Last office visit: 3/28/2024  Scheduled for follow up 6/27/2024    Will route request to NP for review.     Reviewed [place]  Report.

## 2024-04-04 DIAGNOSIS — K11.8 PAROTID MASS: Primary | ICD-10-CM

## 2024-04-05 ENCOUNTER — TELEPHONE (OUTPATIENT)
Dept: OTOLARYNGOLOGY | Facility: CLINIC | Age: 64
End: 2024-04-05

## 2024-04-05 ENCOUNTER — MYC MEDICAL ADVICE (OUTPATIENT)
Dept: OTOLARYNGOLOGY | Facility: CLINIC | Age: 64
End: 2024-04-05

## 2024-04-05 NOTE — TELEPHONE ENCOUNTER
This patient needs the CT Scan ordered by Dr. Jackson scheduled sometime in May or June. If interested could be add onto 6/20 at [facility] when he is getting other imaging done.

## 2024-04-19 NOTE — PROGRESS NOTES
[facility] Oncology Followup  Apr 22, 2024    REASON FOR VISIT: Follow-up for metastatic castration-resistant prostate cancer.      INTERVAL HISTORY:   Walter returns to clinic in consideration of cycle 4 carboplatin/cabazitaxel. He felt cycle 3 was more changing with 7-8 days for weakness and fatigue before his symptoms started to improve. He has persistent neuropathy with numbness and pain the the bottom of his feet and toes, especially the big toe. He continues with accupuncture and gabapentin 300 mg in the am, 300 mg in the afternoon, and 600 mg at HS. He has noticed a new, left lower extremity swelling over the past 2 days with increased left femur discomfort now persistent at a 1-2/10 and up to 3/10. He also has intermittent R sided rib discomfort that feels more musculature. He continues on 25 mcg/72 fentalyl and 1 tablet of oxycodone at HS. He had mild nausea manageable with compazine days 1-3, and alternating compazine with zofran on days 4-5. He has one day of diarrhea on day 2, followed by a few days of constipation. He has metamucil and docusate for constipation that he starts on day 3. No chest pain, shortness of breath, or cough. No fevers or chills.     He has been told he is iron deficient in the past but has decreased the frequency of his oral iron to every 5 days as he felt it was giving him diarrhea previously.     Review of Systems:   ROS: 10 point ROS neg other than the symptoms noted above in the HPI.    ONCOLOGY HISTORY: Mr. 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 His oncologic history is detailed below.     1. De deja metastatic prostate adenocarcinoma, stage IV (M1b at diagnosis), high-volume, castration-sensitive:  - 12/13/2018: PSA found to be elevated to 9.1  "ng/mL on a routine follow-up with primary care provider Dr. Naylor at [facility]. Prior PSA were 1.4 on 8/16/17, 2.4 on 6/28/16, 2.9 on 6/28/16, and as low as 0.4 on 3/26/2003.   - 1/08/2019: Consultation with Sarah Wilson CNP in Urology clinic. Repeat PSA 9.6.  - 1/16/2019: MRI prostate with contrast - \"This examination is characterized as PIRADS 5- very high probability. Clinically significant cancer is highly likely to be present. There is a large, invasive mass arising from the right peripheral zone and extending into the neurovascular bundle, seminal vesicle, and along the anterolateral right mesorectal fascia. Metastatic right external iliac lymph node. Metastatic lesion in the posterior/superior right acetabulum.\"  - 1/25/2019: CT abdomen and pelvis with IV contrast - \"1. Heterogeneous enhancing mass posteriorly in the upper pole of the right kidney measures 4.5 x 5.8 x 5.7 cm (AP by transverse by craniocaudal). It has a small nodular component extending posterior medially which abuts the right psoas muscle. This nodular extension measures 2.1 x 2.1 cm. Minimal stranding about the mass. No definite thrombus within the right renal vein. This renal mass is compatible with a renal cell carcinoma. A paraaortic lymph node situated immediately posterior to the left renal vein measures 1.1 cm in short axis, suspicious for metastasis. 2. A 2 cm right external iliac lymph node is also suspicious for metastases. 3. Multiple sclerotic osseous lesions suspicious for metastases. These include 0.8 and 0.6 cm sclerotic lesions laterally in the right iliac wing (images 53 and 62 respectively). Ill-defined groundglass density laterally in the right acetabulum measuring 1.7 x 1.2 cm corresponds with the lesion identified on MRI. A 0.4 cm groundglass density in the left acetabulum. Sclerotic lesion in the left femoral neck measures 0.9 x 1.6 cm (image 81). Sclerotic metastases would be more compatible with prostate " "metastases. 4. A 3 subcentimeter hepatic lesions are indeterminate. Metastases would be a consideration. These can be further characterized with liver MRI.\"  - 1/25/2019: NM bone scan - \"There is focal bony uptake in the left femoral neck, right acetabulum, right of midline at the S1 or L5 level of the spine, within multiple bilateral ribs, in the C7 or T1 level of the spine, and anteriorly within the skull. Findings are suspicious for metastases.\"  - 1/31/19: CT chest with contrast - \" No suspicious nodules in the chest.  Stable appearance of a 5.8 cm right renal mass concerning for renal carcinoma until proven otherwise.  Stable indeterminant subcentimeter hypodensities in the liver.  Multifocal osteoblastic metastasis including 2.5 cm lesion in the right fifth rib posteriorly and a 1.6 cm lesion in the right third rib posteriorly. No lytic lesions identified.\"  - 2/6/19: CT-guided right sclerotic fifth rib lesion biopsy - \"Metastatic carcinoma, consistent with prostate primary.  Immunohistochemical stains performed show the metastatic carcinoma stains positive for NKX3.1 (prostate marker), negative for STACIE 3 and PAX8, which supports the above diagnosis.\"  - 2/15/19: Started Casodex 50mg every day and consented for the biobanking protocol. 3/18/19 - stopped Casodex.  - 2/19/19: Case discussed in tumor board - recommendation for nephectomy for suspected malignant right renal mass.   - 2/26/19: Started Lupron 22.5mg every 3 months.   - 5/8/19: Started Docetaxel 75mg/m2 IV every 3 weeks. 06/18/19 - cycle 3, 7/10/19 - cycle 4, 07/31/19 - cycle 5, 08/21/19 - cycle 6.   - 10/2/19: Restaging CT CAP and NM Bone Scan showed improved osseous disease, no evidence of recurrent or new metastatic disease.  - 1/5/20: Restaging CT CAP and NM Bone Scan showed stable osseous disease, no evidence of recurrent or new metastatic disease.  - 4/6/20: NM bone scan with slightly improved uptake in known skeletal mets. CT C/A/P with " "contrast with stable osseous mets, no yusuf enlargement, no new visceral mets etc.  - 04/08/20: Zometa every 3 months.  - 10/5/20: CT C/A/P with contrast and NM bone scan - SD.   - 1/4/21: CT C/A/P with contrast and NM bone scan - SD but slight increase in right 5th rib tracer uptake and in posterior L5 sclerosis. 1/6/21  PSA = 0.29  - 03/29/21: CT C/A/P with contrast - single new right sacral 8mm bone lesion (suspicious), other bone mets stable. No visceral/yusuf disease. Nephrectomy site without recurrence. NM bone scan - SD but \"increased uptake associated with the prior lesions of the lower  cervical spine, posterior right fourth rib and right L5 posterior arch elements. Otherwise unchanged uptake associated with the proximal left femur and left anterior fourth rib. Similar uptake of the left halux, possibly secondary to degenerative osteoarthritis or gout.\"    3/31/21 PSA = 0.44  7/7/21  PSA = 0.47  11/2021 PSA = 1.05  -- Start XTANDI  12/16/21 PSA =0.22  2/11/22 PSA= 0.50  3/22/22 PSA=0.72  5/5/2022 PSA=1.79  7/11/2022 PSA=2.16 --Start cycle 1 docetaxel   8/22/22 PSA = 1.36  9/12/22 PSA = 1.09  10/24/22 Cycle #6 of Docetaxel. End of treatment  1/9/23  PSA =0.66  3/20/23  PSA=1.54  4/21/23 PSA = 1.81  T=15  5/22/23 C1 Pluvicto   06/07/23          PSA = 1.42  7/18/23 PSA=1.75, C2 Pluvicto   8/28/23 Transfer of care initial visit for Tabby.  PSA 2.06.  C3 Pluvicto scheduled for 8/30.  Proceed with dose as planned.  MR DELORIS femur, ortho referral, PSMA PET ordered for prior to follow-up.    10/2/23 Palliative RT to L femur metastasis complete.  PSMA PET with mixed response.  PSA 2.21.  RT to L femur pending.  Continue with Dose #4 of Pluvicto despite mixed response to therapy.  Dex course for possible pain flare with RT.    11/8/23  Follow-up prior to Dose #5 Pluvicto.  More pain in chest/ribs/back. PSA 4.38.  Testosterone=3.  Rad onc referral for ribs.  Biopsy progressive lesion for progression on Pluvicto.  " "Cabazitaxel scheduled for follow-up appointment.  Tempus blood testing unrevealing for targetable mutation.    11/27/23 Bx completed from R iliac crest but unrevealing for either PCa or RCC.  Start cabazitaxel C1D1.  PSA 2.81.    12/19/23 PSA = 3.66  1/8/24 PSA = 3.55  1/29/24 PSA = 3.07  2/19/24 PSA= 2.21-Start carboplatin/cabazitaxel.    3/11/24 PSA= C2 Carbo/cabazi.  PSA 1.99.    4/01/24 PSA= 1.74. C3 Carbo/cabazi.   4/22/24 PSA= pending. C4 Carbo/cabazi.       Radiation history:   -C7         3,000 cGy       06 X      8/05/2021        8/18/2021        13       10  -2 Sacrum          2,500 cGy       18 X      4/12/2022        4/18/2022         6           -Sacrum retreat               700 cGy        18 X      2/28/2023        2/28/2023  -Left femur to 2000 cGy in 5 fractions, completed 10/10/2023   -bilateral posterior chest wall at an area of osseous involvement of the ribs and adjacent T4 and 5 spine, to be delivered to 2000 cGy in 5 fractions.  completed 12/13-12/19/23.      2. Stage III ([id number]), grade 3 of 4, clear-cell RCC of right kidney:  - Incidentally diagnosed as above.   - Underwent curative-intent robotic right radical nephrectomy with Dr. Wesley Cleveland on 3/19/19. No tumor spillage per op report.   - Path showed: \"Histologic Type: Clear cell renal cell carcinoma; Sarcomatoid Features: Not identified; Histologic Grade: Nucleolar grade 3 (WHO/ISUP). Extent Tumor Size: 4.3 cm. Microscopic Tumor Extension: Tumor extension into renal sinus (in vascular structures). Margins: Negative. Tumor Necrosis: Present; focal. Lymph-Vascular Invasion: Not identified.  Pathologic Staging (pTNM) Primary Tumor (pT): pT3a: Tumor extends into renal vein branches/renal sinus. Regional Lymph Nodes (pN): pNX. Number of Lymph Nodes Examined: 0 Distant Metastasis (pM): pM N/A.\"  - Restaging scans as above.  No current concerns for recurrence.      PAST MEDICAL HISTORY:  Past Medical History:   Diagnosis Date    Cancer of " kidney, right (H)     Complication of anesthesia     slow wakeup     Malignant neoplasm of prostate metastatic to bone (H) 2/14/2019    Thyroid disease      CURRENT MEDICATIONS:   Current Outpatient Medications:     atorvastatin (LIPITOR) 20 MG tablet, Take 60 mg by mouth daily , Disp: , Rfl:     calcium citrate-vitamin D (CITRACAL) 315-250 MG-UNIT TABS per tablet, Take 650 mg by mouth 2 times daily , Disp: , Rfl:     cycloSPORINE (RESTASIS) 0.05 % ophthalmic emulsion, Place 1 drop into both eyes 2 times daily , Disp: , Rfl:     dexAMETHasone (DECADRON) 4 MG tablet, Take 2 tablets (8 mg) by mouth daily Take for 3 days, starting the day after chemo. Take with food. (Patient not taking: Reported on 3/28/2024), Disp: 6 tablet, Rfl: 2    dexAMETHasone (DECADRON) 4 MG tablet, Take 1 tablet (4 mg) by mouth daily (Patient not taking: Reported on 3/28/2024), Disp: 7 tablet, Rfl: 2    fentaNYL (DURAGESIC) 25 mcg/hr 72 hr patch, Place 1 patch onto the skin every 72 hours remove old patch., Disp: 10 patch, Rfl: 0    gabapentin (NEURONTIN) 300 MG capsule, TAKE ONE CAPSULE BY MOUTH TWICE DAILY AND 2 CAPSULES AT BEDTIME, Disp: 120 capsule, Rfl: 3    levothyroxine (SYNTHROID/LEVOTHROID) 112 MCG tablet, Take 112 mcg by mouth daily, Disp: , Rfl:     lidocaine, viscous, (XYLOCAINE) 2 % solution, Swish and swallow 15 mLs in mouth every 3 hours as needed for pain (before meals and at bedtime) ; Max 8 doses/24 hour period. (Patient not taking: Reported on 3/28/2024), Disp: 100 mL, Rfl: 1    loratadine (CLARITIN) 10 MG tablet, Take 10 mg by mouth daily, Disp: , Rfl:     Multiple Vitamins-Minerals (MULTIVITAMIN ADULT EXTRA C PO), Take 1 tablet by mouth every 24 hours, Disp: , Rfl:     naloxone (NARCAN) 4 MG/0.1ML nasal spray, Spray 1 spray (4 mg) into one nostril alternating nostrils as needed for opioid reversal every 2-3 minutes until assistance arrives, Disp: 0.2 mL, Rfl: 1    Nutritional Supplements (SALMON OIL) CAPS, Take 2 capsules  by mouth daily , Disp: , Rfl:     omeprazole (PRILOSEC) 20 MG DR capsule, Take 20 mg by mouth daily, Disp: , Rfl:     ondansetron (ZOFRAN) 8 MG tablet, Take 1 tablet (8 mg) by mouth every 8 hours as needed for nausea (vomiting), Disp: 30 tablet, Rfl: 2    ondansetron (ZOFRAN) 8 MG tablet, Take 1 tablet (8 mg) by mouth every 8 hours as needed for nausea, Disp: 30 tablet, Rfl: 4    oxyCODONE (ROXICODONE) 5 MG tablet, Take 1-2 tablets (5-10 mg) by mouth every 3 hours as needed for severe pain, Disp: 90 tablet, Rfl: 0    predniSONE (DELTASONE) 5 MG tablet, Take 1 tablet (5 mg) by mouth 2 times daily (with meals) for 21 days, Disp: 42 tablet, Rfl: 0    prochlorperazine (COMPAZINE) 10 MG tablet, Take 1 tablet (10 mg) by mouth every 6 hours as needed for nausea or vomiting, Disp: 30 tablet, Rfl: 2    tamsulosin (FLOMAX) 0.4 MG capsule, Take 1 capsule (0.4 mg) by mouth daily, Disp: 30 capsule, Rfl: 3    triamterene-HCTZ (MAXZIDE-25) 37.5-25 MG tablet, Take 1 tablet by mouth daily (Patient not taking: Reported on 12/19/2023), Disp: , Rfl:       Physical Exam:   /81 (BP Location: Right arm, Patient Position: Sitting, Cuff Size: Adult Regular)   Pulse 76   Temp 98.7  F (37.1  C) (Oral)   Resp 16   Wt 118.3 kg (260 lb 14.4 oz)   SpO2 97%   BMI 36.39 kg/m    Wt Readings from Last 10 Encounters:   04/22/24 118.3 kg (260 lb 14.4 oz)   04/01/24 119.4 kg (263 lb 3.2 oz)   03/28/24 116.1 kg (256 lb)   03/11/24 119.3 kg (263 lb 1.6 oz)   02/19/24 121.2 kg (267 lb 4.8 oz)   02/13/24 121.4 kg (267 lb 11.2 oz)   01/29/24 122.7 kg (270 lb 6.4 oz)   01/24/24 117.9 kg (260 lb)   01/08/24 119.6 kg (263 lb 9.6 oz)   12/20/23 117.9 kg (260 lb)   General: The patient is a pleasant male in no acute distress.  HEENT: EOMI. Sclerae are anicteric. Mucous membranes moist without lesions or thrush.   Lymph: Neck is supple with no lymphadenopathy in the cervical or supraclavicular areas.   Heart: Regular rate and rhythm.   Lungs: Clear to  "auscultation bilaterally.   Abdomen: Bowel sounds present, soft, nontender with no palpable hepatosplenomegaly or masses.   Extremities: 1+ pitting lower extremity edema in the left leg, trace right sided edema.   Neuro: Cranial nerves II through XII are grossly intact.  Skin: No rashes, petechiae, or bruising noted on exposed skin.  Psych: affect bright, alert and oriented    LABORATORY DATA:  Most Recent 3 CBC's:  Recent Labs   Lab Test 04/22/24  0909 04/01/24  0930 03/11/24  0849   WBC 4.4 4.6 3.2*   HGB 9.9* 10.7* 10.5*   MCV 99 98 96    160 161   ANEUTAUTO 3.0 3.0 2.0     Most Recent 3 BMP's:  Recent Labs   Lab Test 04/22/24  0909 04/01/24  0930 03/11/24  0849    141 142   POTASSIUM 4.0 3.9 3.9   CHLORIDE 108* 106 109*   CO2 25 24 23   BUN 14.3 12.4 12.8   CR 0.92 0.92 0.87   ANIONGAP 10 11 10   BETHANY 8.6* 8.6* 8.6*   GLC 92 94 93   PROTTOTAL 6.0* 6.3* 6.0*   ALBUMIN 4.0 4.1 3.9    Most Recent 3 LFT's:  Recent Labs   Lab Test 04/22/24  0909 04/01/24  0930 03/11/24  0849   AST 19 19 17   ALT 22 20 18   ALKPHOS 74 82 82   BILITOTAL 0.4 0.3 0.3     I reviewed the above labs today.    PSA trend, see oncology history.      ASSESSMENT & PLAN: Mr. Reyes is a 64 year old gentleman with metastatic castration sensitive prostate adenocarcinoma as well as an incidentally diagnosed stage III clear-cell renal cell carcinoma of the right kidney (s/p radical R nephrectomy 3/19/19), who is here prior to cycle 3 cabazitaxel.      1. Metastatic castration-resistant prostate cancer, with involvement of the bones and RPLN:   - Patient met the criteria for CHAARTED \"high-volume\" metastatic hormone-sensitive prostate cancer. He opted for docetaxel in combination with ADT (per JOSEFA, [id number], STAMPEDE). He was subsequently treated with docetaxel x6 doses in the [facility] setting and enzalutamide.  He initiated Pluvicto in May 2023 with an initial slight decline in PSA, but this began to rise just prior to Cycle " 3.  Given his XR evidence of progression in L femur and PSA rise, we re-evaluated his progression with PSMA PET scan in September 2023, with note of mixed response.  Reconsented for  [employer] on 8/28/23.  Due for 3 month collection at end of November 2023.  Canceled Pluvicto Dose for November due to symptomatic progression with PSA rise. PSA is labile. Biopsy results from 11/21 are pending for evaluation of NEPC vs small cell given progression without a significant rise in PSA. Tempus peripheral blood mutation analysis is unrevealing for targetable mutations.   -Zometa next due 4/22/2024 along with Lupron.    -Palliative to manage further opiate refills. Pain is well controlled with fentanyl patch.   -Dexamethasone burst for RT and one on hand if needed for pain flares.      -Cabazitaxel started 11/27/23 given progression on Pluvicto.  Continue Zofran and Compazine as needed, patient to call clinic if these are not sufficient in controlling nausea.     -Started carbo/cabazitaxel 2/19/24 with good tolerance with expectd fatigue and mild nausea. He is experiencing grade 2 neuropathy worsened with cycle 3. Continue prednisone daily while on cabazitaxel and Neulasta.    -Will proceed with a 20% dose reduction of cabazitaxel with cycle 4 ptday 4/22/24 due to increased neuropathy. If continues, could consider a dose reduction of carboplatin as well with cycle 5.     2. Pressure in chest, resolved:  Has an ongoing sensation of pressure/need to cough in his chest, primarily against his sternum. Vital signs stable, negative pulmonary physical exam. No chest pain today.     3. Bone metastases:    - Noted to have osseous metastatic disease at the time of diagnosis. S/p radiation to C spine and sacrum (Re-irradiation to sacrum).   - worsening radiculopathy down starting in the low back and radiating down the left leg in June. MRI read was without new/acute abnormalities  - Pain continues to fluctuate but overall well  controlled/managable   - pain mgmt per palliative care, pain is now well controlled with fentanyl patch.   - Encouraged to continue calcium and vitamin D supplementation; continue Zometa 4mg IV every ~3 months. Due to mild hypocalcemia today will hold zometa 4/22/24 and reschedule in 3 weeks. Increase calcium from 2 tablets at home to 3 tablets. Patient confirms dose is 600 mg tablets that he has at home.    - RT to L femur 10/2023.  RT for bilateral posterior ribs 12/2023 with Dr. Albert.  - Added to trial list for [id number].       4. Stage 3, UISS-high risk ccRCC: s/p R nephrectomy   - Nephrectomy 3/19/19 and noted incidentally.  He is now 4.5 years post-op without evidence for recurrence.   - At this point, there is a minimal risk of recurrence of his RCC given the time since surgery without the use of adjuvant immunotherapy. There is no suspicious adenopathy or other features on his most recent imaging studies.    - Will continue to monitor with imaging planned for prostate cancer.      5. Sensory neuropathy, Toes/feet Grade 2  - continue monitor for worsening with cabazitaxel.   - Continue acupuncture.   - He is on gabapentin managed by palliative care 300 mg in the am, 300 mg in the afternoon, and 600 mg at HS. Discussed with Walter increasing his afternoon dose of gabapentin from 300 mg to 600 mg. He will try this and he will update palliative care as well.      6. Anemia  - likely secondary to chemotherapy. Will recheck iron stores today. If low, will have patient increase his iron to three times a week instead of every 5 days.     7. Left lower extremity swelling   - proceed with LLE US to rule out clot. If swelling continues with negative study will consider a CT abdomen/pelvis to rule out bulky adenopathy.     PLAN:   Proceed with C4 cabazitaxel/carboplatin today with a 20% dose reduction of cabazitaxel.   Continue prednisone daily while on chemotherapy.   Keep dexamethasone on hand for pain flares.     Return in 3 weeks for next cycle (C5) as scheduled.   Infusion for Lupron with chemo today 4/22. Will delay zometa by 3 weeks until 5/13/24 with c5 chemotherapy.    Scans and Dr. Mcnair in June.   LLE US today.     42 minutes spent on the date of the encounter doing chart review, review of test results, interpretation of tests, patient visit, and documentation     Yamilet Espinoza PA-C

## 2024-04-22 ENCOUNTER — INFUSION THERAPY VISIT (OUTPATIENT)
Dept: ONCOLOGY | Facility: CLINIC | Age: 64
End: 2024-04-22
Attending: STUDENT IN AN ORGANIZED HEALTH CARE EDUCATION/TRAINING PROGRAM
Payer: COMMERCIAL

## 2024-04-22 ENCOUNTER — ANCILLARY PROCEDURE (OUTPATIENT)
Dept: ULTRASOUND IMAGING | Facility: CLINIC | Age: 64
End: 2024-04-22
Payer: COMMERCIAL

## 2024-04-22 ENCOUNTER — APPOINTMENT (OUTPATIENT)
Dept: LAB | Facility: CLINIC | Age: 64
End: 2024-04-22
Attending: STUDENT IN AN ORGANIZED HEALTH CARE EDUCATION/TRAINING PROGRAM
Payer: COMMERCIAL

## 2024-04-22 VITALS
BODY MASS INDEX: 36.39 KG/M2 | OXYGEN SATURATION: 97 % | HEART RATE: 76 BPM | TEMPERATURE: 98.7 F | WEIGHT: 260.9 LBS | DIASTOLIC BLOOD PRESSURE: 81 MMHG | SYSTOLIC BLOOD PRESSURE: 124 MMHG | RESPIRATION RATE: 16 BRPM

## 2024-04-22 DIAGNOSIS — D64.9 ANEMIA, UNSPECIFIED TYPE: ICD-10-CM

## 2024-04-22 DIAGNOSIS — C61 MALIGNANT NEOPLASM OF PROSTATE METASTATIC TO BONE (H): Primary | ICD-10-CM

## 2024-04-22 DIAGNOSIS — C79.51 MALIGNANT NEOPLASM OF PROSTATE METASTATIC TO BONE (H): Primary | ICD-10-CM

## 2024-04-22 DIAGNOSIS — M54.10 RADICULOPATHY, UNSPECIFIED SPINAL REGION: ICD-10-CM

## 2024-04-22 DIAGNOSIS — Z76.89 PREVENTION OF CHEMOTHERAPY-INDUCED NEUTROPENIA: ICD-10-CM

## 2024-04-22 DIAGNOSIS — M79.89 LEFT LEG SWELLING: ICD-10-CM

## 2024-04-22 DIAGNOSIS — C61 PROSTATE CANCER (H): ICD-10-CM

## 2024-04-22 LAB
ALBUMIN SERPL BCG-MCNC: 4 G/DL (ref 3.5–5.2)
ALP SERPL-CCNC: 74 U/L (ref 40–150)
ALT SERPL W P-5'-P-CCNC: 22 U/L (ref 0–70)
ANION GAP SERPL CALCULATED.3IONS-SCNC: 10 MMOL/L (ref 7–15)
AST SERPL W P-5'-P-CCNC: 19 U/L (ref 0–45)
BASOPHILS # BLD AUTO: 0 10E3/UL (ref 0–0.2)
BASOPHILS NFR BLD AUTO: 0 %
BILIRUB SERPL-MCNC: 0.4 MG/DL
BUN SERPL-MCNC: 14.3 MG/DL (ref 8–23)
CALCIUM SERPL-MCNC: 8.6 MG/DL (ref 8.8–10.2)
CHLORIDE SERPL-SCNC: 108 MMOL/L (ref 98–107)
CREAT SERPL-MCNC: 0.92 MG/DL (ref 0.67–1.17)
DEPRECATED HCO3 PLAS-SCNC: 25 MMOL/L (ref 22–29)
EGFRCR SERPLBLD CKD-EPI 2021: >90 ML/MIN/1.73M2
EOSINOPHIL # BLD AUTO: 0 10E3/UL (ref 0–0.7)
EOSINOPHIL NFR BLD AUTO: 0 %
ERYTHROCYTE [DISTWIDTH] IN BLOOD BY AUTOMATED COUNT: 21 % (ref 10–15)
FERRITIN SERPL-MCNC: 105 NG/ML (ref 31–409)
GLUCOSE SERPL-MCNC: 92 MG/DL (ref 70–99)
HCT VFR BLD AUTO: 30.3 % (ref 40–53)
HGB BLD-MCNC: 9.9 G/DL (ref 13.3–17.7)
IMM GRANULOCYTES # BLD: 0 10E3/UL
IMM GRANULOCYTES NFR BLD: 1 %
IRON BINDING CAPACITY (ROCHE): 275 UG/DL (ref 240–430)
IRON SATN MFR SERPL: 26 % (ref 15–46)
IRON SERPL-MCNC: 72 UG/DL (ref 61–157)
LYMPHOCYTES # BLD AUTO: 0.8 10E3/UL (ref 0.8–5.3)
LYMPHOCYTES NFR BLD AUTO: 18 %
MCH RBC QN AUTO: 32.4 PG (ref 26.5–33)
MCHC RBC AUTO-ENTMCNC: 32.7 G/DL (ref 31.5–36.5)
MCV RBC AUTO: 99 FL (ref 78–100)
MONOCYTES # BLD AUTO: 0.6 10E3/UL (ref 0–1.3)
MONOCYTES NFR BLD AUTO: 13 %
NEUTROPHILS # BLD AUTO: 3 10E3/UL (ref 1.6–8.3)
NEUTROPHILS NFR BLD AUTO: 68 %
NRBC # BLD AUTO: 0 10E3/UL
NRBC BLD AUTO-RTO: 0 /100
PLATELET # BLD AUTO: 166 10E3/UL (ref 150–450)
POTASSIUM SERPL-SCNC: 4 MMOL/L (ref 3.4–5.3)
PROT SERPL-MCNC: 6 G/DL (ref 6.4–8.3)
PSA SERPL DL<=0.01 NG/ML-MCNC: 1.85 NG/ML (ref 0–4.5)
RBC # BLD AUTO: 3.06 10E6/UL (ref 4.4–5.9)
SODIUM SERPL-SCNC: 143 MMOL/L (ref 135–145)
WBC # BLD AUTO: 4.4 10E3/UL (ref 4–11)

## 2024-04-22 PROCEDURE — 96367 TX/PROPH/DG ADDL SEQ IV INF: CPT

## 2024-04-22 PROCEDURE — 93971 EXTREMITY STUDY: CPT | Mod: LT | Performed by: RADIOLOGY

## 2024-04-22 PROCEDURE — 84153 ASSAY OF PSA TOTAL: CPT

## 2024-04-22 PROCEDURE — 36591 DRAW BLOOD OFF VENOUS DEVICE: CPT

## 2024-04-22 PROCEDURE — 80053 COMPREHEN METABOLIC PANEL: CPT

## 2024-04-22 PROCEDURE — 96417 CHEMO IV INFUS EACH ADDL SEQ: CPT

## 2024-04-22 PROCEDURE — 85025 COMPLETE CBC W/AUTO DIFF WBC: CPT

## 2024-04-22 PROCEDURE — 99213 OFFICE O/P EST LOW 20 MIN: CPT | Mod: 25

## 2024-04-22 PROCEDURE — 96375 TX/PRO/DX INJ NEW DRUG ADDON: CPT

## 2024-04-22 PROCEDURE — 83550 IRON BINDING TEST: CPT

## 2024-04-22 PROCEDURE — 96377 APPLICATON ON-BODY INJECTOR: CPT | Mod: 59

## 2024-04-22 PROCEDURE — 96372 THER/PROPH/DIAG INJ SC/IM: CPT | Mod: 59

## 2024-04-22 PROCEDURE — 99215 OFFICE O/P EST HI 40 MIN: CPT

## 2024-04-22 PROCEDURE — 84403 ASSAY OF TOTAL TESTOSTERONE: CPT

## 2024-04-22 PROCEDURE — 96413 CHEMO IV INFUSION 1 HR: CPT

## 2024-04-22 PROCEDURE — 250N000011 HC RX IP 250 OP 636: Mod: JW

## 2024-04-22 PROCEDURE — 250N000011 HC RX IP 250 OP 636: Mod: JZ | Performed by: STUDENT IN AN ORGANIZED HEALTH CARE EDUCATION/TRAINING PROGRAM

## 2024-04-22 PROCEDURE — 258N000003 HC RX IP 258 OP 636

## 2024-04-22 PROCEDURE — 250N000011 HC RX IP 250 OP 636

## 2024-04-22 PROCEDURE — 82728 ASSAY OF FERRITIN: CPT

## 2024-04-22 PROCEDURE — 96402 CHEMO HORMON ANTINEOPL SQ/IM: CPT

## 2024-04-22 RX ORDER — METHYLPREDNISOLONE SODIUM SUCCINATE 125 MG/2ML
125 INJECTION, POWDER, LYOPHILIZED, FOR SOLUTION INTRAMUSCULAR; INTRAVENOUS
Status: CANCELLED
Start: 2024-04-22

## 2024-04-22 RX ORDER — HEPARIN SODIUM (PORCINE) LOCK FLUSH IV SOLN 100 UNIT/ML 100 UNIT/ML
500 SOLUTION INTRAVENOUS ONCE
Status: COMPLETED | OUTPATIENT
Start: 2024-04-22 | End: 2024-04-22

## 2024-04-22 RX ORDER — PALONOSETRON 0.05 MG/ML
0.25 INJECTION, SOLUTION INTRAVENOUS ONCE
Status: COMPLETED | OUTPATIENT
Start: 2024-04-22 | End: 2024-04-22

## 2024-04-22 RX ORDER — HEPARIN SODIUM (PORCINE) LOCK FLUSH IV SOLN 100 UNIT/ML 100 UNIT/ML
5 SOLUTION INTRAVENOUS
Status: CANCELLED | OUTPATIENT
Start: 2024-04-22

## 2024-04-22 RX ORDER — PALONOSETRON 0.05 MG/ML
0.25 INJECTION, SOLUTION INTRAVENOUS ONCE
Status: CANCELLED | OUTPATIENT
Start: 2024-04-22

## 2024-04-22 RX ORDER — ALBUTEROL SULFATE 90 UG/1
1-2 AEROSOL, METERED RESPIRATORY (INHALATION)
Status: CANCELLED
Start: 2024-04-22

## 2024-04-22 RX ORDER — HEPARIN SODIUM,PORCINE 10 UNIT/ML
5-20 VIAL (ML) INTRAVENOUS DAILY PRN
Status: CANCELLED | OUTPATIENT
Start: 2024-04-22

## 2024-04-22 RX ORDER — ALBUTEROL SULFATE 0.83 MG/ML
2.5 SOLUTION RESPIRATORY (INHALATION)
Status: CANCELLED | OUTPATIENT
Start: 2024-04-22

## 2024-04-22 RX ORDER — LORAZEPAM 2 MG/ML
0.5 INJECTION INTRAMUSCULAR EVERY 4 HOURS PRN
Status: CANCELLED | OUTPATIENT
Start: 2024-04-22

## 2024-04-22 RX ORDER — GABAPENTIN 300 MG/1
CAPSULE ORAL
Qty: 150 CAPSULE | Refills: 3 | Status: SHIPPED | OUTPATIENT
Start: 2024-04-22 | End: 2024-07-03

## 2024-04-22 RX ORDER — PREDNISONE 5 MG/1
5 TABLET ORAL 2 TIMES DAILY WITH MEALS
Qty: 42 TABLET | Refills: 0 | Status: SHIPPED | OUTPATIENT
Start: 2024-04-22 | End: 2024-05-13

## 2024-04-22 RX ORDER — MEPERIDINE HYDROCHLORIDE 25 MG/ML
25 INJECTION INTRAMUSCULAR; INTRAVENOUS; SUBCUTANEOUS EVERY 30 MIN PRN
Status: CANCELLED | OUTPATIENT
Start: 2024-04-22

## 2024-04-22 RX ORDER — HEPARIN SODIUM (PORCINE) LOCK FLUSH IV SOLN 100 UNIT/ML 100 UNIT/ML
5 SOLUTION INTRAVENOUS
Status: DISCONTINUED | OUTPATIENT
Start: 2024-04-22 | End: 2024-04-22 | Stop reason: HOSPADM

## 2024-04-22 RX ORDER — DIPHENHYDRAMINE HYDROCHLORIDE 50 MG/ML
50 INJECTION INTRAMUSCULAR; INTRAVENOUS
Status: CANCELLED
Start: 2024-04-22

## 2024-04-22 RX ORDER — EPINEPHRINE 1 MG/ML
0.3 INJECTION, SOLUTION INTRAMUSCULAR; SUBCUTANEOUS EVERY 5 MIN PRN
Status: CANCELLED | OUTPATIENT
Start: 2024-04-22

## 2024-04-22 RX ADMIN — LEUPROLIDE ACETATE 22.5 MG: KIT at 11:38

## 2024-04-22 RX ADMIN — PEGFILGRASTIM 6 MG: KIT SUBCUTANEOUS at 13:48

## 2024-04-22 RX ADMIN — Medication 5 ML: at 13:48

## 2024-04-22 RX ADMIN — SODIUM CHLORIDE 250 ML: 9 INJECTION, SOLUTION INTRAVENOUS at 10:45

## 2024-04-22 RX ADMIN — PALONOSETRON HYDROCHLORIDE 0.25 MG: 0.25 INJECTION INTRAVENOUS at 11:06

## 2024-04-22 RX ADMIN — FAMOTIDINE 20 MG: 10 INJECTION INTRAVENOUS at 11:05

## 2024-04-22 RX ADMIN — SODIUM CHLORIDE 40 MG: 9 INJECTION, SOLUTION INTRAVENOUS at 11:52

## 2024-04-22 RX ADMIN — DIPHENHYDRAMINE HYDROCHLORIDE 25 MG: 50 INJECTION, SOLUTION INTRAMUSCULAR; INTRAVENOUS at 10:45

## 2024-04-22 RX ADMIN — DEXAMETHASONE SODIUM PHOSPHATE: 10 INJECTION, SOLUTION INTRAMUSCULAR; INTRAVENOUS at 11:04

## 2024-04-22 RX ADMIN — CARBOPLATIN 550 MG: 600 INJECTION, SOLUTION INTRAVENOUS at 13:00

## 2024-04-22 RX ADMIN — Medication 500 UNITS: at 09:01

## 2024-04-22 ASSESSMENT — PAIN SCALES - GENERAL: PAINLEVEL: MILD PAIN (2)

## 2024-04-22 NOTE — PROGRESS NOTES
Infusion Injection Note:  Walter Reyes presents today for Lupron injection.      Heather RICHARDS RN checked in with patient prior to injection.     Pre and Post Injection:  Lupron injection given to Right Ventrogluteal without incident.   Patient tolerated procedure well.    Benjamin Bean

## 2024-04-22 NOTE — NURSING NOTE
Chief Complaint   Patient presents with    Port Draw     Labs drawn via port by RN in lab     Port accessed with 20 gauge, 3/4 inch, flat needle by RN, labs collected, line flushed with saline and heparin.  Vitals taken. Pt checked in for appointment(s).     Elizabeth Snider RN

## 2024-04-22 NOTE — TELEPHONE ENCOUNTER
Received [email or web address] message from patient requesting refill of gabapentin. Pt reports oncology increased his dosing to 300/600/600 mg.     Last refill: 4/5/24  Last office visit: 3/28/24  Scheduled for follow up 6/27/24     Will route request to NP for review.     Reviewed [place]  Report.

## 2024-04-22 NOTE — NURSING NOTE
"Oncology Rooming Note    April 22, 2024 9:22 AM   Walter Reyes is a 64 year old male who presents for:    Chief Complaint   Patient presents with    Port Draw     Labs drawn via port by RN in lab    Oncology Clinic Visit     Malignant neoplasm of prostate metastatic to bone     Initial Vitals: /81 (BP Location: Right arm, Patient Position: Sitting, Cuff Size: Adult Regular)   Pulse 76   Temp 98.7  F (37.1  C) (Oral)   Resp 16   Wt 118.3 kg (260 lb 14.4 oz)   SpO2 97%   BMI 36.39 kg/m   Estimated body mass index is 36.39 kg/m  as calculated from the following:    Height as of 3/28/24: 1.803 m (5' 11\").    Weight as of this encounter: 118.3 kg (260 lb 14.4 oz). Body surface area is 2.43 meters squared.  Mild Pain (2) Comment: Data Unavailable   No LMP for male patient.  Allergies reviewed: Yes  Medications reviewed: Yes    Medications: Medication refills not needed today.  Pharmacy name entered into EPIC:    [facility] [place], [place] - [address] [facility] - [place], [place] - [address] [facility] [place], [place] - [address] [facility] [address] [address]  [facility] [place], [place] - [address] [facility]    Frailty Screening:   Is the patient here for a new oncology consult visit in cancer care? 2. No      Clinical concerns: none       Marika Acosta              "

## 2024-04-22 NOTE — Clinical Note
"    4/22/2024         RE: Walter Reyes  [id number]  [facility] [id number]        Dear Colleague,    Thank you for referring your patient, Walter Reyes, to the [facility]. Please see a copy of my visit note below.        [facility] Oncology Followup  Apr 22, 2024    REASON FOR VISIT: Follow-up for metastatic castration-resistant prostate cancer.      INTERVAL HISTORY:   Walter returns to clinic in consideration of cycle 4 carboplatin/cabazitaxel. 7-8 days for weakness and fatigue to improve. Numbness in the bottom of toes, foot constant. Doing accupuncture. Can be painful. Gabapentin 300 mg in the am, afternoon, and 600 mg at HS. Left femur discomfort increasing now always a 1-2 up to a 3/10. R sided rib discomfort that feels more musculature. 25 mcg/72 fentalyl and oxycodone. Compazine days 1-3, zofran on days 4-5. Taking metamucil for constipation, 1 day of diarrhea on day 2 followed by constipation. Left leg swelling new over past 2 days.           He reports that cycle 2 went under the cycle 1.  He received his chemotherapy today 3/11/2024 and by the following Thursday 3/14/24 approximately 3 days after his infusion he started to feel \"sick\" with fatigue deconditioning. By that  Friday 3/15 he started to feel better and this continued into week 2 with some good and some bad days.  He had mild nausea the first week after chemotherapy and improved with Compazine as needed.  He has been avoiding Zofran as this causes constipation for him.  Finds that it is with the first week that his stamina is low he does have some more shortness of breath with exertion but none at rest.  This improves as his stamina improves throughout the second and third week.  He has some neuropathy that does seem to be increasing slowly in his toes particularly his big toes on each foot.  He is having more difficulties with toe yoga but is not having any bowel changes overall.  " "Occasional tingling in his fingertips but less so compared to his toes. He has tinnitus that is long standing, this is more pronounced the first 1 week after his infusion but then improves back to baseline. Appetite is mildly decreased, overall feeling less hungry. He thought further about receiving lupron/zometa on the same day as his next infusion and has decided same day is his preference. No chest pain or cough. No fevers or chills.     Review of Systems:   ROS: 10 point ROS neg other than the symptoms noted above in the HPI.    ONCOLOGY HISTORY: Mr. 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 His oncologic history is detailed below.     1. 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 Naylor at [facility]. Prior PSA were 1.4 on 8/16/17, 2.4 on 6/28/16, 2.9 on 6/28/16, and as low as 0.4 on 3/26/2003.   - 1/08/2019: Consultation with Sarah Wilson CNP in Urology clinic. Repeat PSA 9.6.  - 1/16/2019: MRI prostate with contrast - \"This examination is characterized as PIRADS 5- very high probability. Clinically significant cancer is highly likely to be present. There is a large, invasive mass arising from the right peripheral zone and extending into the neurovascular bundle, seminal vesicle, and along the anterolateral right mesorectal fascia. Metastatic right external iliac lymph node. Metastatic lesion in the posterior/superior right acetabulum.\"  - 1/25/2019: CT abdomen and pelvis with IV contrast - \"1. Heterogeneous enhancing mass posteriorly in the upper pole of the right kidney measures 4.5 x 5.8 x 5.7 cm (AP by " "transverse by craniocaudal). It has a small nodular component extending posterior medially which abuts the right psoas muscle. This nodular extension measures 2.1 x 2.1 cm. Minimal stranding about the mass. No definite thrombus within the right renal vein. This renal mass is compatible with a renal cell carcinoma. A paraaortic lymph node situated immediately posterior to the left renal vein measures 1.1 cm in short axis, suspicious for metastasis. 2. A 2 cm right external iliac lymph node is also suspicious for metastases. 3. Multiple sclerotic osseous lesions suspicious for metastases. These include 0.8 and 0.6 cm sclerotic lesions laterally in the right iliac wing (images 53 and 62 respectively). Ill-defined groundglass density laterally in the right acetabulum measuring 1.7 x 1.2 cm corresponds with the lesion identified on MRI. A 0.4 cm groundglass density in the left acetabulum. Sclerotic lesion in the left femoral neck measures 0.9 x 1.6 cm (image 81). Sclerotic metastases would be more compatible with prostate metastases. 4. A 3 subcentimeter hepatic lesions are indeterminate. Metastases would be a consideration. These can be further characterized with liver MRI.\"  - 1/25/2019: NM bone scan - \"There is focal bony uptake in the left femoral neck, right acetabulum, right of midline at the S1 or L5 level of the spine, within multiple bilateral ribs, in the C7 or T1 level of the spine, and anteriorly within the skull. Findings are suspicious for metastases.\"  - 1/31/19: CT chest with contrast - \" No suspicious nodules in the chest.  Stable appearance of a 5.8 cm right renal mass concerning for renal carcinoma until proven otherwise.  Stable indeterminant subcentimeter hypodensities in the liver.  Multifocal osteoblastic metastasis including 2.5 cm lesion in the right fifth rib posteriorly and a 1.6 cm lesion in the right third rib posteriorly. No lytic lesions identified.\"  - 2/6/19: CT-guided right sclerotic " "fifth rib lesion biopsy - \"Metastatic carcinoma, consistent with prostate primary.  Immunohistochemical stains performed show the metastatic carcinoma stains positive for NKX3.1 (prostate marker), negative for STACIE 3 and PAX8, which supports the above diagnosis.\"  - 2/15/19: Started Casodex 50mg every day and consented for the biobanking protocol. 3/18/19 - stopped Casodex.  - 2/19/19: Case discussed in tumor board - recommendation for nephectomy for suspected malignant right renal mass.   - 2/26/19: Started Lupron 22.5mg every 3 months.   - 5/8/19: Started Docetaxel 75mg/m2 IV every 3 weeks. 06/18/19 - cycle 3, 7/10/19 - cycle 4, 07/31/19 - cycle 5, 08/21/19 - cycle 6.   - 10/2/19: Restaging CT CAP and NM Bone Scan showed improved osseous disease, no evidence of recurrent or new metastatic disease.  - 1/5/20: Restaging CT CAP and NM Bone Scan showed stable osseous disease, no evidence of recurrent or new metastatic disease.  - 4/6/20: NM bone scan with slightly improved uptake in known skeletal mets. CT C/A/P with contrast with stable osseous mets, no yusuf enlargement, no new visceral mets etc.  - 04/08/20: Zometa every 3 months.  - 10/5/20: CT C/A/P with contrast and NM bone scan - SD.   - 1/4/21: CT C/A/P with contrast and NM bone scan - SD but slight increase in right 5th rib tracer uptake and in posterior L5 sclerosis. 1/6/21  PSA = 0.29  - 03/29/21: CT C/A/P with contrast - single new right sacral 8mm bone lesion (suspicious), other bone mets stable. No visceral/yusuf disease. Nephrectomy site without recurrence. NM bone scan - SD but \"increased uptake associated with the prior lesions of the lower  cervical spine, posterior right fourth rib and right L5 posterior arch elements. Otherwise unchanged uptake associated with the proximal left femur and left anterior fourth rib. Similar uptake of the left halux, possibly secondary to degenerative osteoarthritis or gout.\"    3/31/21 PSA = 0.44  7/7/21  PSA = " 0.47  11/2021 PSA = 1.05  -- Start XTANDI  12/16/21 PSA =0.22  2/11/22 PSA= 0.50  3/22/22 PSA=0.72  5/5/2022 PSA=1.79  7/11/2022 PSA=2.16 --Start cycle 1 docetaxel   8/22/22 PSA = 1.36  9/12/22 PSA = 1.09  10/24/22 Cycle #6 of Docetaxel. End of treatment  1/9/23  PSA =0.66  3/20/23  PSA=1.54  4/21/23 PSA = 1.81  T=15  5/22/23 C1 Pluvicto   06/07/23          PSA = 1.42  7/18/23 PSA=1.75, C2 Pluvicto   8/28/23 Transfer of care initial visit for Tabby.  PSA 2.06.  C3 Pluvicto scheduled for 8/30.  Proceed with dose as planned.  MR L femur, ortho referral, PSMA PET ordered for prior to follow-up.    10/2/23 Palliative RT to L femur metastasis complete.  PSMA PET with mixed response.  PSA 2.21.  RT to L femur pending.  Continue with Dose #4 of Pluvicto despite mixed response to therapy.  Dex course for possible pain flare with RT.    11/8/23  Follow-up prior to Dose #5 Pluvicto.  More pain in chest/ribs/back. PSA 4.38.  Testosterone=3.  Rad onc referral for ribs.  Biopsy progressive lesion for progression on Pluvicto.  Cabazitaxel scheduled for follow-up appointment.  Tempus blood testing unrevealing for targetable mutation.    11/27/23 Bx completed from R iliac crest but unrevealing for either PCa or RCC.  Start cabazitaxel C1D1.  PSA 2.81.    12/19/23 PSA = 3.66  1/8/24 PSA = 3.55  1/29/24 PSA = 3.07  2/19/24 PSA= 2.21-Start carboplatin/cabazitaxel.    3/11/24 PSA= C2 Carbo/cabazi.  PSA 1.99.    4/01/24 PSA= 1.74. C3 Carbo/cabazi.   4/22/24 PSA= pending. C4 Carbo/cabazi.       Radiation history:   -C7         3,000 cGy       06 X      8/05/2021        8/18/2021        13       10  -2 Sacrum          2,500 cGy       18 X      4/12/2022        4/18/2022         6           -Sacrum retreat               700 cGy        18 X      2/28/2023        2/28/2023  -Left femur to 2000 cGy in 5 fractions, completed 10/10/2023   -bilateral posterior chest wall at an area of osseous involvement of the ribs and adjacent T4 and 5 spine,  "to be delivered to 2000 cGy in 5 fractions.  completed 12/13-12/19/23.      2. Stage III ([id number]), grade 3 of 4, clear-cell RCC of right kidney:  - Incidentally diagnosed as above.   - Underwent curative-intent robotic right radical nephrectomy with Dr. Wesley Cleveland on 3/19/19. No tumor spillage per op report.   - Path showed: \"Histologic Type: Clear cell renal cell carcinoma; Sarcomatoid Features: Not identified; Histologic Grade: Nucleolar grade 3 (WHO/ISUP). Extent Tumor Size: 4.3 cm. Microscopic Tumor Extension: Tumor extension into renal sinus (in vascular structures). Margins: Negative. Tumor Necrosis: Present; focal. Lymph-Vascular Invasion: Not identified.  Pathologic Staging (pTNM) Primary Tumor (pT): pT3a: Tumor extends into renal vein branches/renal sinus. Regional Lymph Nodes (pN): pNX. Number of Lymph Nodes Examined: 0 Distant Metastasis (pM): pM N/A.\"  - Restaging scans as above.  No current concerns for recurrence.      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 Take with food. (Patient not taking: Reported on 3/28/2024), Disp: 6 tablet, Rfl: 2    dexAMETHasone (DECADRON) 4 MG tablet, Take 1 tablet (4 mg) by mouth daily (Patient not taking: Reported on 3/28/2024), Disp: 7 tablet, Rfl: 2    fentaNYL (DURAGESIC) 25 mcg/hr " 72 hr patch, Place 1 patch onto the skin every 72 hours remove old patch., Disp: 10 patch, Rfl: 0    gabapentin (NEURONTIN) 300 MG capsule, TAKE ONE CAPSULE BY MOUTH TWICE DAILY AND 2 CAPSULES AT BEDTIME, Disp: 120 capsule, Rfl: 3    levothyroxine (SYNTHROID/LEVOTHROID) 112 MCG tablet, Take 112 mcg by mouth daily, Disp: , Rfl:     lidocaine, viscous, (XYLOCAINE) 2 % solution, Swish and swallow 15 mLs in mouth every 3 hours as needed for pain (before meals and at bedtime) ; Max 8 doses/24 hour period. (Patient not taking: Reported on 3/28/2024), Disp: 100 mL, Rfl: 1    loratadine (CLARITIN) 10 MG tablet, Take 10 mg by mouth daily, Disp: , Rfl:     Multiple Vitamins-Minerals (MULTIVITAMIN ADULT EXTRA C PO), Take 1 tablet by mouth every 24 hours, Disp: , Rfl:     naloxone (NARCAN) 4 MG/0.1ML nasal spray, Spray 1 spray (4 mg) into one nostril alternating nostrils as needed for opioid reversal every 2-3 minutes until assistance arrives, Disp: 0.2 mL, Rfl: 1    Nutritional Supplements (SALMON OIL) CAPS, Take 2 capsules by mouth daily , Disp: , Rfl:     omeprazole (PRILOSEC) 20 MG DR capsule, Take 20 mg by mouth daily, Disp: , Rfl:     ondansetron (ZOFRAN) 8 MG tablet, Take 1 tablet (8 mg) by mouth every 8 hours as needed for nausea (vomiting), Disp: 30 tablet, Rfl: 2    ondansetron (ZOFRAN) 8 MG tablet, Take 1 tablet (8 mg) by mouth every 8 hours as needed for nausea, Disp: 30 tablet, Rfl: 4    oxyCODONE (ROXICODONE) 5 MG tablet, Take 1-2 tablets (5-10 mg) by mouth every 3 hours as needed for severe pain, Disp: 90 tablet, Rfl: 0    predniSONE (DELTASONE) 5 MG tablet, Take 1 tablet (5 mg) by mouth 2 times daily (with meals) for 21 days, Disp: 42 tablet, Rfl: 0    prochlorperazine (COMPAZINE) 10 MG tablet, Take 1 tablet (10 mg) by mouth every 6 hours as needed for nausea or vomiting, Disp: 30 tablet, Rfl: 2    tamsulosin (FLOMAX) 0.4 MG capsule, Take 1 capsule (0.4 mg) by mouth daily, Disp: 30 capsule, Rfl: 3     triamterene-HCTZ (MAXZIDE-25) 37.5-25 MG tablet, Take 1 tablet by mouth daily (Patient not taking: Reported on 12/19/2023), Disp: , Rfl:       Physical Exam:   /81 (BP Location: Right arm, Patient Position: Sitting, Cuff Size: Adult Regular)   Pulse 76   Temp 98.7  F (37.1  C) (Oral)   Resp 16   Wt 118.3 kg (260 lb 14.4 oz)   SpO2 97%   BMI 36.39 kg/m    Wt Readings from Last 10 Encounters:   04/22/24 118.3 kg (260 lb 14.4 oz)   04/01/24 119.4 kg (263 lb 3.2 oz)   03/28/24 116.1 kg (256 lb)   03/11/24 119.3 kg (263 lb 1.6 oz)   02/19/24 121.2 kg (267 lb 4.8 oz)   02/13/24 121.4 kg (267 lb 11.2 oz)   01/29/24 122.7 kg (270 lb 6.4 oz)   01/24/24 117.9 kg (260 lb)   01/08/24 119.6 kg (263 lb 9.6 oz)   12/20/23 117.9 kg (260 lb)   General: The patient is a pleasant male in no acute distress.  HEENT: EOMI. Sclerae are anicteric. Mucous membranes moist without lesions or thrush.   Lymph: Neck is supple with no lymphadenopathy in the cervical or supraclavicular areas.   Heart: Regular rate and rhythm.   Lungs: Clear to auscultation bilaterally.   Abdomen: Bowel sounds present, soft, nontender with no palpable hepatosplenomegaly or masses.   Extremities: No lower extremity edema noted bilaterally.   Neuro: Cranial nerves II through XII are grossly intact.  Skin: No rashes, petechiae, or bruising noted on exposed skin.  Psych: affect bright, alert and oriented    LABORATORY DATA:  Most Recent 3 CBC's:  Recent Labs   Lab Test 04/01/24  0930 03/11/24  0849 02/19/24  1103   WBC 4.6 3.2* 4.0   HGB 10.7* 10.5* 10.5*   MCV 98 96 93    161 212   ANEUTAUTO 3.0 2.0 2.6     Most Recent 3 BMP's:  Recent Labs   Lab Test 04/01/24  0930 03/11/24  0849 02/19/24  1103    142 143   POTASSIUM 3.9 3.9 3.7   CHLORIDE 106 109* 108*   CO2 24 23 24   BUN 12.4 12.8 13.5   CR 0.92 0.87 1.05   ANIONGAP 11 10 11   BETHANY 8.6* 8.6* 8.6*   GLC 94 93 101*   PROTTOTAL 6.3* 6.0* 6.2*   ALBUMIN 4.1 3.9 3.9    Most Recent 3  "LFT's:  Recent Labs   Lab Test 04/01/24  0930 03/11/24  0849 02/19/24  1103   AST 19 17 16   ALT 20 18 15   ALKPHOS 82 82 87   BILITOTAL 0.3 0.3 0.3     I reviewed the above labs today.    PSA trend, see oncology history.      ASSESSMENT & PLAN: Mr. Reyes is a 64 year old gentleman with metastatic castration sensitive prostate adenocarcinoma as well as an incidentally diagnosed stage III clear-cell renal cell carcinoma of the right kidney (s/p radical R nephrectomy 3/19/19), who is here prior to cycle 3 cabazitaxel.      1. Metastatic castration-resistant prostate cancer, with involvement of the bones and RPLN:   - Patient met the criteria for CHAARTED \"high-volume\" metastatic hormone-sensitive prostate cancer. He opted for docetaxel in combination with ADT (per JOSEFA, [id number], FELIPEEDE). He was subsequently treated with docetaxel x6 doses in the [facility] setting and enzalutamide.  He initiated Pluvicto in May 2023 with an initial slight decline in PSA, but this began to rise just prior to Cycle 3.  Given his XR evidence of progression in L femur and PSA rise, we re-evaluated his progression with PSMA PET scan in September 2023, with note of mixed response.  Reconsented for  [employer] on 8/28/23.  Due for 3 month collection at end of November 2023.  Canceled Pluvicto Dose for November due to symptomatic progression with PSA rise. PSA is labile. Biopsy results from 11/21 are pending for evaluation of NEPC vs small cell given progression without a significant rise in PSA. Tempus peripheral blood mutation analysis is unrevealing for targetable mutations.   -Zometa next due 4/22/2024 along with Lupron.    -Palliative to manage further opiate refills. Pain is well controlled with fentanyl patch.   -Dexamethasone burst for RT and one on hand if needed for pain flares.      -Cabazitaxel started 11/27/23 given progression on Pluvicto.  Continue Zofran and Compazine as needed, patient to call clinic if these are " not sufficient in controlling nausea.     -Started carbo/cabazitaxel 2/19/24 with good tolerance with expectd fatigue and mild nausea. He is experiencing grade 2 neuropathy. Continue prednisone daily while on cabazitaxel and Neulasta.    -Consider a 20% dose reduction of cabazitaxel with cycle 4 if neuropathy is worsening.     2. Pressure in chest, resolved:  Has an ongoing sensation of pressure/need to cough in his chest, primarily against his sternum. Vital signs stable, negative pulmonary physical exam. No chest pain today.     3. Bone metastases:    - Noted to have osseous metastatic disease at the time of diagnosis. S/p radiation to C spine and sacrum (Re-irradiation to sacrum).   - worsening radiculopathy down starting in the low back and radiating down the left leg in June. MRI read was without new/acute abnormalities  - Pain continues to fluctuate but overall well controlled/managable   - pain mgmt per palliative care, pain is now well controlled with fentanyl patch.   - Encouraged to continue calcium and vitamin D supplementation; continue Zometa 4mg IV every ~3 months. Due next in January 2024, will give today, 1/29/24..   - RT to L femur 10/2023.  RT for bilateral posterior ribs 12/2023 with Dr. Albert.  - Added to trial list for [id number].       4. Stage 3, UISS-high risk ccRCC: s/p R nephrectomy   - Nephrectomy 3/19/19 and noted incidentally.  He is now 4.5 years post-op without evidence for recurrence.   - At this point, there is a minimal risk of recurrence of his RCC given the time since surgery without the use of adjuvant immunotherapy. There is no suspicious adenopathy or other features on his most recent imaging studies.    - Will continue to monitor with imaging planned for prostate cancer.      5. Sensory neuropathy, Toes >fingers. Grade 2  - monitor for worsening with cabazitaxel.   - Continue acupuncture.   - He is on gabapentin managed by palliative care. Could consider a dose increase after  discussion with palliative care as well.     PLAN:   Proceed with C3 cabazitaxel/carboplatin today.   Continue prednisone daily while on chemotherapy.   Keep dexamethasone on hand for pain flares.    Return in 3 weeks for next cycle (C4) as scheduled.   Infusion for Lupron/zometa with chemo on 4/22.    Scans and Dr. Mcnair in June.             [facility] Oncology Followup  Apr 22, 2024    REASON FOR VISIT: Follow-up for metastatic castration-resistant prostate cancer.      INTERVAL HISTORY:   Walter returns to clinic in consideration of cycle 4 carboplatin/cabazitaxel. He felt cycle 3 was more changing with 7-8 days for weakness and fatigue before his symptoms started to improve. He has persistent neuropathy with numbness and pain the the bottom of his feet and toes, especially the big toe. He continues with accupuncture and gabapentin 300 mg in the am, 300 mg in the afternoon, and 600 mg at HS. He has noticed a new, left lower extremity swelling over the past 2 days with increased left femur discomfort now persistent at a 1-2/10 and up to 3/10. He also has intermittent R sided rib discomfort that feels more musculature. He continues on 25 mcg/72 fentalyl and 1 tablet of oxycodone at HS. He had mild nausea manageable with compazine days 1-3, and alternating compazine with zofran on days 4-5. He has one day of diarrhea on day 2, followed by a few days of constipation. He has metamucil and docusate for constipation that he starts on day 3. No chest pain, shortness of breath, or cough. No fevers or chills.     He has been told he is iron deficient in the past but has decreased the frequency of his oral iron to every 5 days as he felt it was giving him diarrhea previously.     Review of Systems:   ROS: 10 point ROS neg other than the symptoms noted above in the HPI.    ONCOLOGY HISTORY: Mr. Walter Reyes is a 64 year old gentleman with a metastatic castration-sensitive prostate cancer and incidentally  "diagnosed stage 3 clear cell RCC (s/p radical R nephrectomy on 3/19/19) which is now 4.5+ years post-therapy without evidence of recurrence. His oncologic history is detailed below.     1. 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 Naylor at [facility]. Prior PSA were 1.4 on 8/16/17, 2.4 on 6/28/16, 2.9 on 6/28/16, and as low as 0.4 on 3/26/2003.   - 1/08/2019: Consultation with Sarah Wilson CNP in Urology clinic. Repeat PSA 9.6.  - 1/16/2019: MRI prostate with contrast - \"This examination is characterized as PIRADS 5- very high probability. Clinically significant cancer is highly likely to be present. There is a large, invasive mass arising from the right peripheral zone and extending into the neurovascular bundle, seminal vesicle, and along the anterolateral right mesorectal fascia. Metastatic right external iliac lymph node. Metastatic lesion in the posterior/superior right acetabulum.\"  - 1/25/2019: CT abdomen and pelvis with IV contrast - \"1. Heterogeneous enhancing mass posteriorly in the upper pole of the right kidney measures 4.5 x 5.8 x 5.7 cm (AP by transverse by craniocaudal). It has a small nodular component extending posterior medially which abuts the right psoas muscle. This nodular extension measures 2.1 x 2.1 cm. Minimal stranding about the mass. No definite thrombus within the right renal vein. This renal mass is compatible with a renal cell carcinoma. A paraaortic lymph node situated immediately posterior to the left renal vein measures 1.1 cm in short axis, suspicious for metastasis. 2. A 2 cm right external iliac lymph node is also suspicious for metastases. 3. Multiple sclerotic osseous lesions suspicious for metastases. These include 0.8 and 0.6 cm sclerotic lesions laterally in the right iliac wing (images 53 and 62 " "respectively). Ill-defined groundglass density laterally in the right acetabulum measuring 1.7 x 1.2 cm corresponds with the lesion identified on MRI. A 0.4 cm groundglass density in the left acetabulum. Sclerotic lesion in the left femoral neck measures 0.9 x 1.6 cm (image 81). Sclerotic metastases would be more compatible with prostate metastases. 4. A 3 subcentimeter hepatic lesions are indeterminate. Metastases would be a consideration. These can be further characterized with liver MRI.\"  - 1/25/2019: NM bone scan - \"There is focal bony uptake in the left femoral neck, right acetabulum, right of midline at the S1 or L5 level of the spine, within multiple bilateral ribs, in the C7 or T1 level of the spine, and anteriorly within the skull. Findings are suspicious for metastases.\"  - 1/31/19: CT chest with contrast - \" No suspicious nodules in the chest.  Stable appearance of a 5.8 cm right renal mass concerning for renal carcinoma until proven otherwise.  Stable indeterminant subcentimeter hypodensities in the liver.  Multifocal osteoblastic metastasis including 2.5 cm lesion in the right fifth rib posteriorly and a 1.6 cm lesion in the right third rib posteriorly. No lytic lesions identified.\"  - 2/6/19: CT-guided right sclerotic fifth rib lesion biopsy - \"Metastatic carcinoma, consistent with prostate primary.  Immunohistochemical stains performed show the metastatic carcinoma stains positive for NKX3.1 (prostate marker), negative for STACIE 3 and PAX8, which supports the above diagnosis.\"  - 2/15/19: Started Casodex 50mg every day and consented for the biobanking protocol. 3/18/19 - stopped Casodex.  - 2/19/19: Case discussed in tumor board - recommendation for nephectomy for suspected malignant right renal mass.   - 2/26/19: Started Lupron 22.5mg every 3 months.   - 5/8/19: Started Docetaxel 75mg/m2 IV every 3 weeks. 06/18/19 - cycle 3, 7/10/19 - cycle 4, 07/31/19 - cycle 5, 08/21/19 - cycle 6.   - 10/2/19: " "Restaging CT CAP and NM Bone Scan showed improved osseous disease, no evidence of recurrent or new metastatic disease.  - 1/5/20: Restaging CT CAP and NM Bone Scan showed stable osseous disease, no evidence of recurrent or new metastatic disease.  - 4/6/20: NM bone scan with slightly improved uptake in known skeletal mets. CT C/A/P with contrast with stable osseous mets, no yusuf enlargement, no new visceral mets etc.  - 04/08/20: Zometa every 3 months.  - 10/5/20: CT C/A/P with contrast and NM bone scan - SD.   - 1/4/21: CT C/A/P with contrast and NM bone scan - SD but slight increase in right 5th rib tracer uptake and in posterior L5 sclerosis. 1/6/21  PSA = 0.29  - 03/29/21: CT C/A/P with contrast - single new right sacral 8mm bone lesion (suspicious), other bone mets stable. No visceral/yusuf disease. Nephrectomy site without recurrence. NM bone scan - SD but \"increased uptake associated with the prior lesions of the lower  cervical spine, posterior right fourth rib and right L5 posterior arch elements. Otherwise unchanged uptake associated with the proximal left femur and left anterior fourth rib. Similar uptake of the left halux, possibly secondary to degenerative osteoarthritis or gout.\"    3/31/21 PSA = 0.44  7/7/21  PSA = 0.47  11/2021 PSA = 1.05  -- Start XTANDI  12/16/21 PSA =0.22  2/11/22 PSA= 0.50  3/22/22 PSA=0.72  5/5/2022 PSA=1.79  7/11/2022 PSA=2.16 --Start cycle 1 docetaxel   8/22/22 PSA = 1.36  9/12/22 PSA = 1.09  10/24/22 Cycle #6 of Docetaxel. End of treatment  1/9/23  PSA =0.66  3/20/23  PSA=1.54  4/21/23 PSA = 1.81  T=15  5/22/23 C1 Pluvicto   06/07/23          PSA = 1.42  7/18/23 PSA=1.75, C2 Pluvicto   8/28/23 Transfer of care initial visit for Tabby.  PSA 2.06.  C3 Pluvicto scheduled for 8/30.  Proceed with dose as planned.  MR L femur, ortho referral, PSMA PET ordered for prior to follow-up.    10/2/23 Palliative RT to L femur metastasis complete.  PSMA PET with mixed response.  PSA 2.21.  " "RT to L femur pending.  Continue with Dose #4 of Pluvicto despite mixed response to therapy.  Dex course for possible pain flare with RT.    11/8/23  Follow-up prior to Dose #5 Pluvicto.  More pain in chest/ribs/back. PSA 4.38.  Testosterone=3.  Rad onc referral for ribs.  Biopsy progressive lesion for progression on Pluvicto.  Cabazitaxel scheduled for follow-up appointment.  [facility]us blood testing unrevealing for targetable mutation.    11/27/23 Bx completed from R iliac crest but unrevealing for either PCa or RCC.  Start cabazitaxel C1D1.  PSA 2.81.    12/19/23 PSA = 3.66  1/8/24 PSA = 3.55  1/29/24 PSA = 3.07  2/19/24 PSA= 2.21-Start carboplatin/cabazitaxel.    3/11/24 PSA= C2 Carbo/cabazi.  PSA 1.99.    4/01/24 PSA= 1.74. C3 Carbo/cabazi.   4/22/24 PSA= pending. C4 Carbo/cabazi.       Radiation history:   -C7         3,000 cGy       06 X      8/05/2021        8/18/2021        13       10  -2 Sacrum          2,500 cGy       18 X      4/12/2022        4/18/2022         6           -Sacrum retreat               700 cGy        18 X      2/28/2023        2/28/2023  -Left femur to 2000 cGy in 5 fractions, completed 10/10/2023   -bilateral posterior chest wall at an area of osseous involvement of the ribs and adjacent T4 and 5 spine, to be delivered to 2000 cGy in 5 fractions.  completed 12/13-12/19/23.      2. Stage III ([id number]), grade 3 of 4, clear-cell RCC of right kidney:  - Incidentally diagnosed as above.   - Underwent curative-intent robotic right radical nephrectomy with Dr. Wesley Cleveland on 3/19/19. No tumor spillage per op report.   - Path showed: \"Histologic Type: Clear cell renal cell carcinoma; Sarcomatoid Features: Not identified; Histologic Grade: Nucleolar grade 3 (WHO/ISUP). Extent Tumor Size: 4.3 cm. Microscopic Tumor Extension: Tumor extension into renal sinus (in vascular structures). Margins: Negative. Tumor Necrosis: Present; focal. Lymph-Vascular Invasion: Not identified.  Pathologic Staging " "(pTNM) Primary Tumor (pT): pT3a: Tumor extends into renal vein branches/renal sinus. Regional Lymph Nodes (pN): pNX. Number of Lymph Nodes Examined: 0 Distant Metastasis (pM): pM N/A.\"  - Restaging scans as above.  No current concerns for recurrence.      PAST MEDICAL HISTORY:  Past Medical History:   Diagnosis Date     Cancer of kidney, right (H)      Complication of anesthesia     slow wakeup      Malignant neoplasm of prostate metastatic to bone (H) 2/14/2019     Thyroid disease      CURRENT MEDICATIONS:   Current Outpatient Medications:      atorvastatin (LIPITOR) 20 MG tablet, Take 60 mg by mouth daily , Disp: , Rfl:      calcium citrate-vitamin D (CITRACAL) 315-250 MG-UNIT TABS per tablet, Take 650 mg by mouth 2 times daily , Disp: , Rfl:      cycloSPORINE (RESTASIS) 0.05 % ophthalmic emulsion, Place 1 drop into both eyes 2 times daily , Disp: , Rfl:      dexAMETHasone (DECADRON) 4 MG tablet, Take 2 tablets (8 mg) by mouth daily Take for 3 days, starting the day after chemo. Take with food. (Patient not taking: Reported on 3/28/2024), Disp: 6 tablet, Rfl: 2     dexAMETHasone (DECADRON) 4 MG tablet, Take 1 tablet (4 mg) by mouth daily (Patient not taking: Reported on 3/28/2024), Disp: 7 tablet, Rfl: 2     fentaNYL (DURAGESIC) 25 mcg/hr 72 hr patch, Place 1 patch onto the skin every 72 hours remove old patch., Disp: 10 patch, Rfl: 0     gabapentin (NEURONTIN) 300 MG capsule, TAKE ONE CAPSULE BY MOUTH TWICE DAILY AND 2 CAPSULES AT BEDTIME, Disp: 120 capsule, Rfl: 3     levothyroxine (SYNTHROID/LEVOTHROID) 112 MCG tablet, Take 112 mcg by mouth daily, Disp: , Rfl:      lidocaine, viscous, (XYLOCAINE) 2 % solution, Swish and swallow 15 mLs in mouth every 3 hours as needed for pain (before meals and at bedtime) ; Max 8 doses/24 hour period. (Patient not taking: Reported on 3/28/2024), Disp: 100 mL, Rfl: 1     loratadine (CLARITIN) 10 MG tablet, Take 10 mg by mouth daily, Disp: , Rfl:      Multiple Vitamins-Minerals " (MULTIVITAMIN ADULT EXTRA C PO), Take 1 tablet by mouth every 24 hours, Disp: , Rfl:      naloxone (NARCAN) 4 MG/0.1ML nasal spray, Spray 1 spray (4 mg) into one nostril alternating nostrils as needed for opioid reversal every 2-3 minutes until assistance arrives, Disp: 0.2 mL, Rfl: 1     Nutritional Supplements (SALMON OIL) CAPS, Take 2 capsules by mouth daily , Disp: , Rfl:      omeprazole (PRILOSEC) 20 MG DR capsule, Take 20 mg by mouth daily, Disp: , Rfl:      ondansetron (ZOFRAN) 8 MG tablet, Take 1 tablet (8 mg) by mouth every 8 hours as needed for nausea (vomiting), Disp: 30 tablet, Rfl: 2     ondansetron (ZOFRAN) 8 MG tablet, Take 1 tablet (8 mg) by mouth every 8 hours as needed for nausea, Disp: 30 tablet, Rfl: 4     oxyCODONE (ROXICODONE) 5 MG tablet, Take 1-2 tablets (5-10 mg) by mouth every 3 hours as needed for severe pain, Disp: 90 tablet, Rfl: 0     predniSONE (DELTASONE) 5 MG tablet, Take 1 tablet (5 mg) by mouth 2 times daily (with meals) for 21 days, Disp: 42 tablet, Rfl: 0     prochlorperazine (COMPAZINE) 10 MG tablet, Take 1 tablet (10 mg) by mouth every 6 hours as needed for nausea or vomiting, Disp: 30 tablet, Rfl: 2     tamsulosin (FLOMAX) 0.4 MG capsule, Take 1 capsule (0.4 mg) by mouth daily, Disp: 30 capsule, Rfl: 3     triamterene-HCTZ (MAXZIDE-25) 37.5-25 MG tablet, Take 1 tablet by mouth daily (Patient not taking: Reported on 12/19/2023), Disp: , Rfl:       Physical Exam:   /81 (BP Location: Right arm, Patient Position: Sitting, Cuff Size: Adult Regular)   Pulse 76   Temp 98.7  F (37.1  C) (Oral)   Resp 16   Wt 118.3 kg (260 lb 14.4 oz)   SpO2 97%   BMI 36.39 kg/m    Wt Readings from Last 10 Encounters:   04/22/24 118.3 kg (260 lb 14.4 oz)   04/01/24 119.4 kg (263 lb 3.2 oz)   03/28/24 116.1 kg (256 lb)   03/11/24 119.3 kg (263 lb 1.6 oz)   02/19/24 121.2 kg (267 lb 4.8 oz)   02/13/24 121.4 kg (267 lb 11.2 oz)   01/29/24 122.7 kg (270 lb 6.4 oz)   01/24/24 117.9 kg (260 lb)    01/08/24 119.6 kg (263 lb 9.6 oz)   12/20/23 117.9 kg (260 lb)   General: The patient is a pleasant male in no acute distress.  HEENT: EOMI. Sclerae are anicteric. Mucous membranes moist without lesions or thrush.   Lymph: Neck is supple with no lymphadenopathy in the cervical or supraclavicular areas.   Heart: Regular rate and rhythm.   Lungs: Clear to auscultation bilaterally.   Abdomen: Bowel sounds present, soft, nontender with no palpable hepatosplenomegaly or masses.   Extremities: 1+ pitting lower extremity edema in the left leg, trace right sided edema.   Neuro: Cranial nerves II through XII are grossly intact.  Skin: No rashes, petechiae, or bruising noted on exposed skin.  Psych: affect bright, alert and oriented    LABORATORY DATA:  Most Recent 3 CBC's:  Recent Labs   Lab Test 04/22/24  0909 04/01/24  0930 03/11/24  0849   WBC 4.4 4.6 3.2*   HGB 9.9* 10.7* 10.5*   MCV 99 98 96    160 161   ANEUTAUTO 3.0 3.0 2.0     Most Recent 3 BMP's:  Recent Labs   Lab Test 04/22/24  0909 04/01/24  0930 03/11/24  0849    141 142   POTASSIUM 4.0 3.9 3.9   CHLORIDE 108* 106 109*   CO2 25 24 23   BUN 14.3 12.4 12.8   CR 0.92 0.92 0.87   ANIONGAP 10 11 10   BETHANY 8.6* 8.6* 8.6*   GLC 92 94 93   PROTTOTAL 6.0* 6.3* 6.0*   ALBUMIN 4.0 4.1 3.9    Most Recent 3 LFT's:  Recent Labs   Lab Test 04/22/24  0909 04/01/24  0930 03/11/24  0849   AST 19 19 17   ALT 22 20 18   ALKPHOS 74 82 82   BILITOTAL 0.4 0.3 0.3     I reviewed the above labs today.    PSA trend, see oncology history.      ASSESSMENT & PLAN: Mr. Reyes is a 64 year old gentleman with metastatic castration sensitive prostate adenocarcinoma as well as an incidentally diagnosed stage III clear-cell renal cell carcinoma of the right kidney (s/p radical R nephrectomy 3/19/19), who is here prior to cycle 3 cabazitaxel.      1. Metastatic castration-resistant prostate cancer, with involvement of the bones and RPLN:   - Patient met the criteria for  "CHAARTED \"high-volume\" metastatic hormone-sensitive prostate cancer. He opted for docetaxel in combination with ADT (per JOSEFA, [id number], KARLA). He was subsequently treated with docetaxel x6 doses in the [facility] setting and enzalutamide.  He initiated Pluvicto in May 2023 with an initial slight decline in PSA, but this began to rise just prior to Cycle 3.  Given his XR evidence of progression in L femur and PSA rise, we re-evaluated his progression with PSMA PET scan in September 2023, with note of mixed response.  Reconsented for  [facility] on 8/28/23.  Due for 3 month collection at end of November 2023.  Canceled Pluvicto Dose for November due to symptomatic progression with PSA rise. PSA is labile. Biopsy results from 11/21 are pending for evaluation of NEPC vs small cell given progression without a significant rise in PSA. [facility] peripheral blood mutation analysis is unrevealing for targetable mutations.   -Zometa next due 4/22/2024 along with Lupron.    -Palliative to manage further opiate refills. Pain is well controlled with fentanyl patch.   -Dexamethasone burst for RT and one on hand if needed for pain flares.      -Cabazitaxel started 11/27/23 given progression on Pluvicto.  Continue Zofran and Compazine as needed, patient to call clinic if these are not sufficient in controlling nausea.     -Started carbo/cabazitaxel 2/19/24 with good tolerance with expectd fatigue and mild nausea. He is experiencing grade 2 neuropathy worsened with cycle 3. Continue prednisone daily while on cabazitaxel and Neulasta.    -Will proceed with a 20% dose reduction of cabazitaxel with cycle 4 ptday 4/22/24 due to increased neuropathy. If continues, could consider a dose reduction of carboplatin as well with cycle 5.     2. Pressure in chest, resolved:  Has an ongoing sensation of pressure/need to cough in his chest, primarily against his sternum. Vital signs stable, negative pulmonary physical exam. No chest pain today. "     3. Bone metastases:    - Noted to have osseous metastatic disease at the time of diagnosis. S/p radiation to C spine and sacrum (Re-irradiation to sacrum).   - worsening radiculopathy down starting in the low back and radiating down the left leg in June. MRI read was without new/acute abnormalities  - Pain continues to fluctuate but overall well controlled/managable   - pain mgmt per palliative care, pain is now well controlled with fentanyl patch.   - Encouraged to continue calcium and vitamin D supplementation; continue Zometa 4mg IV every ~3 months. Due to mild hypocalcemia today will hold zometa 4/22/24 and reschedule in 3 weeks. Increase calcium from 2 tablets at home to 3 tablets. Patient confirms dose is 600 mg tablets that he has at home.    - RT to L femur 10/2023.  RT for bilateral posterior ribs 12/2023 with Dr. Albert.  - Added to trial list for [id number].       4. Stage 3, UISS-high risk ccRCC: s/p R nephrectomy   - Nephrectomy 3/19/19 and noted incidentally.  He is now 4.5 years post-op without evidence for recurrence.   - At this point, there is a minimal risk of recurrence of his RCC given the time since surgery without the use of adjuvant immunotherapy. There is no suspicious adenopathy or other features on his most recent imaging studies.    - Will continue to monitor with imaging planned for prostate cancer.      5. Sensory neuropathy, Toes/feet Grade 2  - continue monitor for worsening with cabazitaxel.   - Continue acupuncture.   - He is on gabapentin managed by palliative care 300 mg in the am, 300 mg in the afternoon, and 600 mg at HS. Discussed with Walter increasing his afternoon dose of gabapentin from 300 mg to 600 mg. He will try this and he will update palliative care as well.      6. Anemia  - likely secondary to chemotherapy. Will recheck iron stores today. If low, will have patient increase his iron to three times a week instead of every 5 days.     7. Left lower extremity swelling    - proceed with LLE US to rule out clot. If swelling continues with negative study will consider a CT abdomen/pelvis to rule out bulky adenopathy.     PLAN:   Proceed with C4 cabazitaxel/carboplatin today with a 20% dose reduction of cabazitaxel.   Continue prednisone daily while on chemotherapy.   Keep dexamethasone on hand for pain flares.    Return in 3 weeks for next cycle (C5) as scheduled.   Infusion for Lupron with chemo today 4/22. Will delay zometa by 3 weeks until 5/13/24 with c5 chemotherapy.    Scans and Dr. Mcnair in June.   LLE US today.     42 minutes spent on the date of the encounter doing chart review, review of test results, interpretation of tests, patient visit, and documentation     Yamilet Espinoza PA-C            Again, thank you for allowing me to participate in the care of your patient.        Sincerely,        Yamilet Espinoza PA-C

## 2024-04-22 NOTE — PROGRESS NOTES
Infusion Nursing Note:  Walter Reyes presents today for cycle 4 day 1 cabazitaxel and carboplatin.    Patient seen by provider today: Yes: Yamilet Espinoza PA-C   present during visit today: Not Applicable.    Note: Pt comes to infusion today with no questions or concerns. He was seen in clinic by Yamilet Espinoza PA-C. Pt wishes to proceed with today's planned treatment.    KAREN Espinoza PA-C/Emily Meese, RN 04/22/24 1030  -I dose reduced with cabazi by 20%, carboplatin dose unchanged at AUC 4. Please give lupron today. No zometa. He has an US of the left lower extremity after his infusion today.  - I see his PSA is mildly increased. I talked to Dr. Mcnair and he is comfortable proceeding with the above plan    Intravenous Access:  Implanted Port.    Treatment Conditions:  Lab Results   Component Value Date    HGB 9.9 (L) 04/22/2024    WBC 4.4 04/22/2024    ANEU 5.0 07/07/2021    ANEUTAUTO 3.0 04/22/2024     04/22/2024     Lab Results   Component Value Date     04/22/2024    POTASSIUM 4.0 04/22/2024    MAG 1.9 08/25/2023    CR 0.92 04/22/2024    BETHANY 8.6 (L) 04/22/2024    BILITOTAL 0.4 04/22/2024    ALBUMIN 4.0 04/22/2024    ALT 22 04/22/2024    AST 19 04/22/2024     Results reviewed, labs MET treatment parameters, ok to proceed with treatment.    Post Infusion Assessment:  Patient tolerated infusion without incident.  Blood return noted pre and post infusion.  Site patent and intact, free from redness, edema or discomfort.  No evidence of extravasations.  Access discontinued per protocol.     Neulasta Onpro On-Body injector applied to right abdomen at 1350.  Writer discussed Neulasta injection would start tomorrow 4/23 at 1650, approximately 27 hours after application applied today.    Written and Verbal instruction reviewed with patient.  Pt instructed when the dose delivery starts, it will take about 45 minutes to complete.  Pt aware Neulasta Onpro On-Body should have green  flashing light and to call triage or on-call MD if injector flashes red or appears to be leaking.   Pt aware to keep Onpro On-Body Neulasta 4 inches away from electrical equipment and to avoid showering 4 hours prior to injection.     Discharge Plan:   Prescription refills given for prednisone.  Discharge instructions reviewed with: Patient and Family.  Patient and/or family verbalized understanding of discharge instructions and all questions answered.  AVS to patient via STYLIGH[facility].  Patient will return 05/13/24 for next appointment.   Patient discharged in stable condition accompanied by: wife.  Departure Mode: Ambulatory.      Emily Meese, RN

## 2024-04-22 NOTE — PATIENT INSTRUCTIONS
[facility] Triage and after hours / weekends / holidays:  [phone]    Please call the triage or after hours line if you experience a temperature greater than or equal to 100.4, shaking chills, have uncontrolled nausea, vomiting and/or diarrhea, dizziness, shortness of breath, chest pain, bleeding, unexplained bruising, or if you have any other new/concerning symptoms, questions or concerns.      If you are having any concerning symptoms or wish to speak to a provider before your next infusion visit, please call triage to notify them so we can adequately serve you.     If you need a refill on a narcotic prescription or other medication, please call before your infusion appointment.                April 2024 Sunday Monday Tuesday Wednesday Thursday Friday Saturday        1    LAB CENTRAL   9:00 AM   (15 min.)   [facility] LAB DRAW   [facility]    RETURN CCSL   9:15 AM   (45 min.)   Yamilet Espinoza PA-C   [facility]    ONC INFUSION 2.5 HR (150 MIN)  11:00 AM   (150 min.)   ROSALINA ONC INFUSION NURSE   [facility] 2     3     4     5     6       7     8     9     10     11     12     13       14     15     16     17     18     19     20       21     22    LAB CENTRAL   9:00 AM   (15 min.)   [facility] LAB DRAW   [facility]    RETURN CCSL   9:15 AM   (45 min.)   Yamilet Espinoza PA-C   [facility]    ONC INFUSION 2.5 HR (150 MIN)  10:30 AM   (150 min.)   ROSALINA ONC INFUSION NURSE   [facility]    US LWR EXT VENOUS DUPLEX LEFT   1:05 PM   (30 min.)   [id number]   [facility] 23     24     25     26     27       28     29     30                                     May 2024      Jorge Monday Tuesday Wednesday Thursday Friday Saturday                  1     2     3     4       5     6     7     8     9     10     11        12     13    LAB CENTRAL   9:00 AM   (15 min.)   [facility] LAB DRAW   [facility]    RETURN CCSL   9:15 AM   (45 min.)   Yamilet Espinoza PA-C   [facility]    ONC INFUSION 2.5 HR (150 MIN)  10:30 AM   (150 min.)    ONC INFUSION NURSE   [facility] 14     15     16     17     18       19     20     21     22     23     24     25       26     27     28     29     30     31                          Lab Results:  Recent Results (from the past 12 hour(s))   PSA tumor marker    Collection Time: 04/22/24  9:09 AM   Result Value Ref Range    PSA Tumor Marker 1.85 0.00 - 4.50 ng/mL   Comprehensive metabolic panel    Collection Time: 04/22/24  9:09 AM   Result Value Ref Range    Sodium 143 135 - 145 mmol/L    Potassium 4.0 3.4 - 5.3 mmol/L    Carbon Dioxide (CO2) 25 22 - 29 mmol/L    Anion Gap 10 7 - 15 mmol/L    Urea Nitrogen 14.3 8.0 - 23.0 mg/dL    Creatinine 0.92 0.67 - 1.17 mg/dL    GFR Estimate >90 >60 mL/min/1.73m2    Calcium 8.6 (L) 8.8 - 10.2 mg/dL    Chloride 108 (H) 98 - 107 mmol/L    Glucose 92 70 - 99 mg/dL    Alkaline Phosphatase 74 40 - 150 U/L    AST 19 0 - 45 U/L    ALT 22 0 - 70 U/L    Protein Total 6.0 (L) 6.4 - 8.3 g/dL    Albumin 4.0 3.5 - 5.2 g/dL    Bilirubin Total 0.4 <=1.2 mg/dL   CBC with platelets and differential    Collection Time: 04/22/24  9:09 AM   Result Value Ref Range    WBC Count 4.4 4.0 - 11.0 10e3/uL    RBC Count 3.06 (L) 4.40 - 5.90 10e6/uL    Hemoglobin 9.9 (L) 13.3 - 17.7 g/dL    Hematocrit 30.3 (L) 40.0 - 53.0 %    MCV 99 78 - 100 fL    MCH 32.4 26.5 - 33.0 pg    MCHC 32.7 31.5 - 36.5 g/dL    RDW 21.0 (H) 10.0 - 15.0 %    Platelet Count 166 150 - 450 10e3/uL    % Neutrophils 68 %    % Lymphocytes 18 %    % Monocytes 13 %    % Eosinophils 0 %    % Basophils 0 %    % Immature Granulocytes 1 %    NRBCs per 100 WBC 0 <1 /100    Absolute Neutrophils 3.0 1.6 - 8.3 10e3/uL    Absolute Lymphocytes 0.8 0.8  - 5.3 10e3/uL    Absolute Monocytes 0.6 0.0 - 1.3 10e3/uL    Absolute Eosinophils 0.0 0.0 - 0.7 10e3/uL    Absolute Basophils 0.0 0.0 - 0.2 10e3/uL    Absolute Immature Granulocytes 0.0 <=0.4 10e3/uL    Absolute NRBCs 0.0 10e3/uL   Iron and iron binding capacity    Collection Time: 04/22/24  9:09 AM   Result Value Ref Range    Iron 72 61 - 157 ug/dL    Iron Binding Capacity 275 240 - 430 ug/dL    Iron Sat Index 26 15 - 46 %   Ferritin    Collection Time: 04/22/24  9:09 AM   Result Value Ref Range    Ferritin 105 31 - 409 ng/mL

## 2024-04-23 LAB — TESTOST SERPL-MCNC: <2 NG/DL (ref 240–950)

## 2024-04-29 ENCOUNTER — HOSPITAL ENCOUNTER (OUTPATIENT)
Dept: CT IMAGING | Facility: CLINIC | Age: 64
Discharge: HOME | End: 2024-04-29
Payer: COMMERCIAL

## 2024-04-29 ENCOUNTER — HOSPITAL ENCOUNTER (OUTPATIENT)
Facility: CLINIC | Age: 64
Discharge: HOME | End: 2024-04-29
Admitting: RADIOLOGY
Payer: COMMERCIAL

## 2024-04-29 DIAGNOSIS — C61 MALIGNANT NEOPLASM OF PROSTATE METASTATIC TO BONE (H): ICD-10-CM

## 2024-04-29 DIAGNOSIS — C61 PROSTATE CANCER (H): ICD-10-CM

## 2024-04-29 DIAGNOSIS — M79.89 LEFT LEG SWELLING: ICD-10-CM

## 2024-04-29 DIAGNOSIS — C79.51 MALIGNANT NEOPLASM OF PROSTATE METASTATIC TO BONE (H): ICD-10-CM

## 2024-04-29 DIAGNOSIS — M79.89 LEFT LEG SWELLING: Primary | ICD-10-CM

## 2024-04-29 PROCEDURE — 250N000009 HC RX 250

## 2024-04-29 PROCEDURE — 250N000011 HC RX IP 250 OP 636

## 2024-04-29 PROCEDURE — 74177 CT ABD & PELVIS W/CONTRAST: CPT

## 2024-04-29 RX ORDER — IOPAMIDOL 755 MG/ML
128 INJECTION, SOLUTION INTRAVASCULAR ONCE
Status: COMPLETED | OUTPATIENT
Start: 2024-04-29 | End: 2024-04-29

## 2024-04-29 RX ADMIN — SODIUM CHLORIDE 76 ML: 9 INJECTION, SOLUTION INTRAVENOUS at 15:13

## 2024-04-29 RX ADMIN — IOPAMIDOL 128 ML: 755 INJECTION, SOLUTION INTRAVENOUS at 15:13

## 2024-04-29 ASSESSMENT — ACTIVITIES OF DAILY LIVING (ADL)
ADLS_ACUITY_SCORE: 37

## 2024-05-09 DIAGNOSIS — G89.3 CANCER ASSOCIATED PAIN: ICD-10-CM

## 2024-05-09 DIAGNOSIS — C61 PROSTATE CANCER (H): ICD-10-CM

## 2024-05-09 RX ORDER — FENTANYL 25 UG/1
1 PATCH TRANSDERMAL
Qty: 10 PATCH | Refills: 0 | Status: SHIPPED | OUTPATIENT
Start: 2024-05-09 | End: 2024-06-07

## 2024-05-09 NOTE — TELEPHONE ENCOUNTER
Received ITaohart message from patient requesting refill of fentanyl.     Last refill: 4/4/24  Last office visit: 3/28/24  Scheduled for follow up 6/27/24     Will route request to NP for review.     Reviewed [place]  Report.

## 2024-05-10 NOTE — PROGRESS NOTES
[facility] Oncology Followup  May 13, 2024    REASON FOR VISIT: Follow-up for metastatic castration-resistant prostate cancer.      INTERVAL HISTORY:   Walter returns to clinic in consideration of cycle 5 carboplatin/cabazitaxel. Patient reports cycle 4 went well. He had his typical pattern of fatigue and nausea the first week with improvement into the second. This past week he felt very well and was able to spend time outside doing yard work. He uses compazine as needed the first week and zofran in addition as needed. He continues to have a couple of days of diarrhea followed by constipation for a few days before his bowels return to normal. No chest pain, shortness of breath, or cough. No fevers or chills. His is getting accupuncture once a week and has been working with Dr. Mcnair on a prior auth to try to increase this to twice a week. He has noticed improvement in his neuropathy he has less pain and numbness into his feet. Primarily now noticing the neuropathy in his toes. He did have one fall this past cycle after tripping over a hose but otherwise no significant falls or balance changes. He was uninjured in the fall. He endorses continued left sided lower extremity swelling possibly mildly improved from before but still quite bothersome especially swelling up into the thigh.     Review of Systems:   ROS: 10 point ROS neg other than the symptoms noted above in the HPI.    ONCOLOGY HISTORY: Mr. 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 His oncologic history is detailed below.     1. De deja metastatic prostate adenocarcinoma, stage IV (M1b at diagnosis), high-volume, castration-sensitive:  - 12/13/2018: PSA found to be elevated to 9.1 ng/mL on a routine follow-up with primary care  "provider Dr. Naylor at [facility]. Prior PSA were 1.4 on 8/16/17, 2.4 on 6/28/16, 2.9 on 6/28/16, and as low as 0.4 on 3/26/2003.   - 1/08/2019: Consultation with Sarah Wilson CNP in Urology clinic. Repeat PSA 9.6.  - 1/16/2019: MRI prostate with contrast - \"This examination is characterized as PIRADS 5- very high probability. Clinically significant cancer is highly likely to be present. There is a large, invasive mass arising from the right peripheral zone and extending into the neurovascular bundle, seminal vesicle, and along the anterolateral right mesorectal fascia. Metastatic right external iliac lymph node. Metastatic lesion in the posterior/superior right acetabulum.\"  - 1/25/2019: CT abdomen and pelvis with IV contrast - \"1. Heterogeneous enhancing mass posteriorly in the upper pole of the right kidney measures 4.5 x 5.8 x 5.7 cm (AP by transverse by craniocaudal). It has a small nodular component extending posterior medially which abuts the right psoas muscle. This nodular extension measures 2.1 x 2.1 cm. Minimal stranding about the mass. No definite thrombus within the right renal vein. This renal mass is compatible with a renal cell carcinoma. A paraaortic lymph node situated immediately posterior to the left renal vein measures 1.1 cm in short axis, suspicious for metastasis. 2. A 2 cm right external iliac lymph node is also suspicious for metastases. 3. Multiple sclerotic osseous lesions suspicious for metastases. These include 0.8 and 0.6 cm sclerotic lesions laterally in the right iliac wing (images 53 and 62 respectively). Ill-defined groundglass density laterally in the right acetabulum measuring 1.7 x 1.2 cm corresponds with the lesion identified on MRI. A 0.4 cm groundglass density in the left acetabulum. Sclerotic lesion in the left femoral neck measures 0.9 x 1.6 cm (image 81). Sclerotic metastases would be more compatible with prostate metastases. 4. A 3 subcentimeter hepatic lesions " "are indeterminate. Metastases would be a consideration. These can be further characterized with liver MRI.\"  - 1/25/2019: NM bone scan - \"There is focal bony uptake in the left femoral neck, right acetabulum, right of midline at the S1 or L5 level of the spine, within multiple bilateral ribs, in the C7 or T1 level of the spine, and anteriorly within the skull. Findings are suspicious for metastases.\"  - 1/31/19: CT chest with contrast - \" No suspicious nodules in the chest.  Stable appearance of a 5.8 cm right renal mass concerning for renal carcinoma until proven otherwise.  Stable indeterminant subcentimeter hypodensities in the liver.  Multifocal osteoblastic metastasis including 2.5 cm lesion in the right fifth rib posteriorly and a 1.6 cm lesion in the right third rib posteriorly. No lytic lesions identified.\"  - 2/6/19: CT-guided right sclerotic fifth rib lesion biopsy - \"Metastatic carcinoma, consistent with prostate primary.  Immunohistochemical stains performed show the metastatic carcinoma stains positive for NKX3.1 (prostate marker), negative for STACIE 3 and PAX8, which supports the above diagnosis.\"  - 2/15/19: Started Casodex 50mg every day and consented for the biobanking protocol. 3/18/19 - stopped Casodex.  - 2/19/19: Case discussed in tumor board - recommendation for nephectomy for suspected malignant right renal mass.   - 2/26/19: Started Lupron 22.5mg every 3 months.   - 5/8/19: Started Docetaxel 75mg/m2 IV every 3 weeks. 06/18/19 - cycle 3, 7/10/19 - cycle 4, 07/31/19 - cycle 5, 08/21/19 - cycle 6.   - 10/2/19: Restaging CT CAP and NM Bone Scan showed improved osseous disease, no evidence of recurrent or new metastatic disease.  - 1/5/20: Restaging CT CAP and NM Bone Scan showed stable osseous disease, no evidence of recurrent or new metastatic disease.  - 4/6/20: NM bone scan with slightly improved uptake in known skeletal mets. CT C/A/P with contrast with stable osseous mets, no yusuf " "enlargement, no new visceral mets etc.  - 04/08/20: Zometa every 3 months.  - 10/5/20: CT C/A/P with contrast and NM bone scan - SD.   - 1/4/21: CT C/A/P with contrast and NM bone scan - SD but slight increase in right 5th rib tracer uptake and in posterior L5 sclerosis. 1/6/21  PSA = 0.29  - 03/29/21: CT C/A/P with contrast - single new right sacral 8mm bone lesion (suspicious), other bone mets stable. No visceral/yusuf disease. Nephrectomy site without recurrence. NM bone scan - SD but \"increased uptake associated with the prior lesions of the lower  cervical spine, posterior right fourth rib and right L5 posterior arch elements. Otherwise unchanged uptake associated with the proximal left femur and left anterior fourth rib. Similar uptake of the left halux, possibly secondary to degenerative osteoarthritis or gout.\"    3/31/21 PSA = 0.44  7/7/21  PSA = 0.47  11/2021 PSA = 1.05  -- Start XTANDI  12/16/21 PSA =0.22  2/11/22 PSA= 0.50  3/22/22 PSA=0.72  5/5/2022 PSA=1.79  7/11/2022 PSA=2.16 --Start cycle 1 docetaxel   8/22/22 PSA = 1.36  9/12/22 PSA = 1.09  10/24/22 Cycle #6 of Docetaxel. End of treatment  1/9/23  PSA =0.66  3/20/23  PSA=1.54  4/21/23 PSA = 1.81  T=15  5/22/23 C1 Pluvicto   06/07/23          PSA = 1.42  7/18/23 PSA=1.75, C2 Pluvicto   8/28/23 Transfer of care initial visit for Zoryovani.  PSA 2.06.  C3 Pluvicto scheduled for 8/30.  Proceed with dose as planned.  MR DELORIS femur, ortho referral, PSMA PET ordered for prior to follow-up.    10/2/23 Palliative RT to L femur metastasis complete.  PSMA PET with mixed response.  PSA 2.21.  RT to L femur pending.  Continue with Dose #4 of Pluvicto despite mixed response to therapy.  Dex course for possible pain flare with RT.    11/8/23  Follow-up prior to Dose #5 Pluvicto.  More pain in chest/ribs/back. PSA 4.38.  Testosterone=3.  Rad onc referral for ribs.  Biopsy progressive lesion for progression on Pluvicto.  Cabazitaxel scheduled for follow-up appointment.  " "[facility] blood testing unrevealing for targetable mutation.    11/27/23 Bx completed from R iliac crest but unrevealing for either PCa or RCC.  Start cabazitaxel C1D1.  PSA 2.81.    12/19/23 PSA = 3.66  1/8/24 PSA = 3.55  1/29/24 PSA = 3.07  2/19/24 PSA= 2.21-Start carboplatin/cabazitaxel.    3/11/24 PSA= C2 Carbo/cabazi.  PSA 1.99.    4/01/24 PSA= 1.74. C3 Carbo/cabazi.   4/22/24 PSA= 1.85. C4 Carbo/cabazi.   5/13/24 PSA = pending. C5 Carbo/cabazi.       Radiation history:   -C7         3,000 cGy       06 X      8/05/2021        8/18/2021        13       10  -2 Sacrum          2,500 cGy       18 X      4/12/2022        4/18/2022         6           -Sacrum retreat               700 cGy        18 X      2/28/2023        2/28/2023  -Left femur to 2000 cGy in 5 fractions, completed 10/10/2023   -bilateral posterior chest wall at an area of osseous involvement of the ribs and adjacent T4 and 5 spine, to be delivered to 2000 cGy in 5 fractions.  completed 12/13-12/19/23.      2. Stage III ([id number]), grade 3 of 4, clear-cell RCC of right kidney:  - Incidentally diagnosed as above.   - Underwent curative-intent robotic right radical nephrectomy with Dr. Wesley Cleveland on 3/19/19. No tumor spillage per op report.   - Path showed: \"Histologic Type: Clear cell renal cell carcinoma; Sarcomatoid Features: Not identified; Histologic Grade: Nucleolar grade 3 (WHO/ISUP). Extent Tumor Size: 4.3 cm. Microscopic Tumor Extension: Tumor extension into renal sinus (in vascular structures). Margins: Negative. Tumor Necrosis: Present; focal. Lymph-Vascular Invasion: Not identified.  Pathologic Staging (pTNM) Primary Tumor (pT): pT3a: Tumor extends into renal vein branches/renal sinus. Regional Lymph Nodes (pN): pNX. Number of Lymph Nodes Examined: 0 Distant Metastasis (pM): pM N/A.\"  - Restaging scans as above.  No current concerns for recurrence.      PAST MEDICAL HISTORY:  Past Medical History:   Diagnosis Date    Cancer of kidney, " right (H)     Complication of anesthesia     slow wakeup     Malignant neoplasm of prostate metastatic to bone (H) 2/14/2019    Thyroid disease      CURRENT MEDICATIONS:   Current Outpatient Medications:     atorvastatin (LIPITOR) 20 MG tablet, Take 60 mg by mouth daily , Disp: , Rfl:     calcium citrate-vitamin D (CITRACAL) 315-250 MG-UNIT TABS per tablet, Take 650 mg by mouth 2 times daily , Disp: , Rfl:     cycloSPORINE (RESTASIS) 0.05 % ophthalmic emulsion, Place 1 drop into both eyes 2 times daily , Disp: , Rfl:     dexAMETHasone (DECADRON) 4 MG tablet, Take 2 tablets (8 mg) by mouth daily Take for 3 days, starting the day after chemo. Take with food. (Patient not taking: Reported on 3/28/2024), Disp: 6 tablet, Rfl: 2    dexAMETHasone (DECADRON) 4 MG tablet, Take 1 tablet (4 mg) by mouth daily (Patient not taking: Reported on 3/28/2024), Disp: 7 tablet, Rfl: 2    fentaNYL (DURAGESIC) 25 mcg/hr 72 hr patch, Place 1 patch onto the skin every 72 hours remove old patch., Disp: 10 patch, Rfl: 0    gabapentin (NEURONTIN) 300 MG capsule, Take 1 capsule (300 mg) by mouth every morning AND 2 capsules (600 mg) daily at 2 pm AND 2 capsules (600 mg) at bedtime., Disp: 150 capsule, Rfl: 3    levothyroxine (SYNTHROID/LEVOTHROID) 112 MCG tablet, Take 112 mcg by mouth daily, Disp: , Rfl:     lidocaine, viscous, (XYLOCAINE) 2 % solution, Swish and swallow 15 mLs in mouth every 3 hours as needed for pain (before meals and at bedtime) ; Max 8 doses/24 hour period. (Patient not taking: Reported on 3/28/2024), Disp: 100 mL, Rfl: 1    loratadine (CLARITIN) 10 MG tablet, Take 10 mg by mouth daily, Disp: , Rfl:     Multiple Vitamins-Minerals (MULTIVITAMIN ADULT EXTRA C PO), Take 1 tablet by mouth every 24 hours, Disp: , Rfl:     naloxone (NARCAN) 4 MG/0.1ML nasal spray, Spray 1 spray (4 mg) into one nostril alternating nostrils as needed for opioid reversal every 2-3 minutes until assistance arrives, Disp: 0.2 mL, Rfl: 1     "Nutritional Supplements (SALMON OIL) CAPS, Take 2 capsules by mouth daily , Disp: , Rfl:     omeprazole (PRILOSEC) 20 MG DR capsule, Take 20 mg by mouth daily, Disp: , Rfl:     ondansetron (ZOFRAN) 8 MG tablet, Take 1 tablet (8 mg) by mouth every 8 hours as needed for nausea (vomiting), Disp: 30 tablet, Rfl: 2    ondansetron (ZOFRAN) 8 MG tablet, Take 1 tablet (8 mg) by mouth every 8 hours as needed for nausea, Disp: 30 tablet, Rfl: 4    oxyCODONE (ROXICODONE) 5 MG tablet, Take 1-2 tablets (5-10 mg) by mouth every 3 hours as needed for severe pain, Disp: 90 tablet, Rfl: 0    predniSONE (DELTASONE) 5 MG tablet, Take 1 tablet (5 mg) by mouth 2 times daily (with meals) for 21 days, Disp: 42 tablet, Rfl: 0    prochlorperazine (COMPAZINE) 10 MG tablet, Take 1 tablet (10 mg) by mouth every 6 hours as needed for nausea or vomiting, Disp: 30 tablet, Rfl: 2    tamsulosin (FLOMAX) 0.4 MG capsule, Take 1 capsule (0.4 mg) by mouth daily, Disp: 30 capsule, Rfl: 3    triamterene-HCTZ (MAXZIDE-25) 37.5-25 MG tablet, Take 1 tablet by mouth daily (Patient not taking: Reported on 12/19/2023), Disp: , Rfl:       Physical Exam:   /53   Pulse 65   Temp 98.4  F (36.9  C) (Oral)   Resp 16   Ht 1.82 m (5' 11.65\")   Wt 117.9 kg (259 lb 14.4 oz)   SpO2 97%   BMI 35.59 kg/m    Wt Readings from Last 10 Encounters:   04/22/24 118.3 kg (260 lb 14.4 oz)   04/01/24 119.4 kg (263 lb 3.2 oz)   03/28/24 116.1 kg (256 lb)   03/11/24 119.3 kg (263 lb 1.6 oz)   02/19/24 121.2 kg (267 lb 4.8 oz)   02/13/24 121.4 kg (267 lb 11.2 oz)   01/29/24 122.7 kg (270 lb 6.4 oz)   01/24/24 117.9 kg (260 lb)   01/08/24 119.6 kg (263 lb 9.6 oz)   12/20/23 117.9 kg (260 lb)   General: The patient is a pleasant male in no acute distress.  HEENT: EOMI. Sclerae are anicteric. Mucous membranes moist without lesions or thrush.   Lymph: Neck is supple with no lymphadenopathy in the cervical or supraclavicular areas.   Heart: Regular rate and rhythm.   Lungs: " Clear to auscultation bilaterally.   Abdomen: Bowel sounds present, soft, nontender with no palpable hepatosplenomegaly or masses.   Extremities: 1+ pitting lower extremity edema in the left leg into the thigh, trace right sided edema.   Neuro: Cranial nerves II through XII are grossly intact.  Skin: No rashes, petechiae, or bruising noted on exposed skin.  Psych: affect bright, alert and oriented    LABORATORY DATA:  Most Recent 3 CBC's:  Recent Labs   Lab Test 05/13/24  0916 04/22/24  0909 04/01/24  0930   WBC 4.7 4.4 4.6   HGB 9.7* 9.9* 10.7*   * 99 98    166 160   ANEUTAUTO 3.3 3.0 3.0     Most Recent 3 BMP's:  Recent Labs   Lab Test 05/13/24  0916 04/22/24  0909 04/01/24  0930    143 141   POTASSIUM 3.9 4.0 3.9   CHLORIDE 109* 108* 106   CO2 23 25 24   BUN 15.3 14.3 12.4   CR 0.95 0.92 0.92   ANIONGAP 10 10 11   BETHANY 8.6* 8.6* 8.6*   * 92 94   PROTTOTAL 6.1* 6.0* 6.3*   ALBUMIN 4.0 4.0 4.1    Most Recent 3 LFT's:  Recent Labs   Lab Test 05/13/24  0916 04/22/24  0909 04/01/24  0930   AST 21 19 19   ALT 19 22 20   ALKPHOS 76 74 82   BILITOTAL 0.3 0.4 0.3     US left lower extremity 4/22/24:   FINDINGS:  In the left lower extremity, the common femoral, femoral, popliteal  and posterior tibial veins demonstrate normal compressibility and  blood flow. The peroneal vein is not visualized due to swelling.     The right common femoral vein obtained for comparison is unremarkable.     Subcutaneous edema.     CT Abdomen/pelvis with contrast 4/29/24:                                                                  IMPRESSION:  No evidence left lower extremity deep venous thrombosis. Of note, the  left peroneal vein is not visualized.FINDINGS:   LOWER CHEST: Moderate to large hiatal hernia.     HEPATOBILIARY: Hepatic steatosis. No hepatic masses. No calcified  gallstones.     PANCREAS: Normal.     SPLEEN: Normal.     ADRENAL GLANDS: Normal.     KIDNEYS/BLADDER: Right nephrectomy. Nonobstructing  "0.2 cm stone in the  lower pole of the left kidney. No hydronephrosis.     BOWEL: No bowel obstruction. Colonic diverticulosis. No evidence for  colitis or diverticulitis. The appendix is mildly thickened at 0.8 cm,  and there is minimal periappendiceal fat stranding.     LYMPH NODES: No lymphadenopathy.     VASCULATURE: Mild atherosclerotic aortoiliac calcification.     PELVIC ORGANS: Mild prostatic enlargement and heterogeneity,  unchanged.     ADDITIONAL FINDINGS: Small fat-containing paraumbilical hernia.     MUSCULOSKELETAL: Scattered sclerotic metastases throughout the  visualized bones are not significantly changed.                                                                      IMPRESSION:   1.  The appendix is mildly thickened at 0.8 cm, and there is minimal  periappendiceal fat stranding. This finding is of uncertain clinical  significance, although a mild or early appendicitis could have this  appearance.  2.  Scattered sclerotic metastases throughout the visualized bones are  not significantly changed.  3.  Hepatic steatosis.  4.  Moderate to large hiatal hernia.    I reviewed the above labs and imaging today.    PSA trend, see oncology history.      ASSESSMENT & PLAN: Mr. Reyes is a 64 year old gentleman with metastatic castration sensitive prostate adenocarcinoma as well as an incidentally diagnosed stage III clear-cell renal cell carcinoma of the right kidney (s/p radical R nephrectomy 3/19/19), who is here prior to cycle 3 cabazitaxel.      1. Metastatic castration-resistant prostate cancer, with involvement of the bones and RPLN:   - Patient met the criteria for CHAARTED \"high-volume\" metastatic hormone-sensitive prostate cancer. He opted for docetaxel in combination with ADT (per CHAARTED, [id number], STAMPEDE). He was subsequently treated with docetaxel x6 doses in the [facility] setting and enzalutamide.  He initiated Pluvicto in May 2023 with an initial slight decline in PSA, but this " began to rise just prior to Cycle 3.  Given his XR evidence of progression in L femur and PSA rise, we re-evaluated his progression with PSMA PET scan in September 2023, with note of mixed response.  Reconsented for  [facility] on 8/28/23.  Due for 3 month collection at end of November 2023.  Canceled Pluvicto Dose for November due to symptomatic progression with PSA rise. PSA is labile. Biopsy results from 11/21 are pending for evaluation of NEPC vs small cell given progression without a significant rise in PSA. [facility] peripheral blood mutation analysis is unrevealing for targetable mutations.   -Last lupron 4/22/24. Zometa was held due to mild hypocalcemia on 4/22/24. Increased PO calcium supplement and post-pone 5/13/24. He continues to have mild hypocalemia on 5/13/24 labs. Last lupron was 4/22/24.  -Palliative to manage further opiate refills. Pain is well controlled with fentanyl patch.   -Dexamethasone burst for RT and one on hand if needed for pain flares.      -Cabazitaxel started 11/27/23 given progression on Pluvicto.  Continue Zofran and Compazine as needed, patient to call clinic if these are not sufficient in controlling nausea.     -Started carbo/cabazitaxel 2/19/24 with good tolerance with expectd fatigue and mild nausea. He is experiencing grade 2 neuropathy worsened with cycle 3. Continue prednisone daily while on cabazitaxel and Neulasta.    -Will continue with a 20% dose reduction of cabazitaxel with cycle 5 today given improvement in neuropathy with reduction. If neuropathy continues, will consider a dose reduction of carboplatin as well. PSA declined to 1.74 with cycle 3. But has been slowly up trending to 2.06 today 5/13/24. Will need to closely monitor and continue to assess benefit.     2. Pressure in chest, resolved:  Has an ongoing sensation of pressure/need to cough in his chest, primarily against his sternum. Vital signs stable, negative pulmonary physical exam. No chest pain today  5/13/24.     3. Bone metastases:    - Noted to have osseous metastatic disease at the time of diagnosis. S/p radiation to C spine and sacrum (Re-irradiation to sacrum).   - worsening radiculopathy down starting in the low back and radiating down the left leg in June. MRI read was without new/acute abnormalities  - Pain continues to fluctuate but overall well controlled/managable   - pain mgmt per palliative care, pain is now well controlled with fentanyl patch.   - Encouraged to continue calcium and vitamin D supplementation; continue Zometa 4mg IV every ~3 months. Due to mild hypocalcemia today will hold zometa 5/13/24. Increase calcium to 1000 mg BID. Continue vitamin d 1,000 mg daily. Reschedule in 1 week, around 5/20/24.     - RT to L femur 10/2023.  RT for bilateral posterior ribs 12/2023 with Dr. Albert.  - Added to trial list for [id number].       4. Stage 3, UISS-high risk ccRCC: s/p R nephrectomy   - Nephrectomy 3/19/19 and noted incidentally.  He is now 4.5 years post-op without evidence for recurrence.   - At this point, there is a minimal risk of recurrence of his RCC given the time since surgery without the use of adjuvant immunotherapy. There is no suspicious adenopathy or other features on his most recent imaging studies.    - Will continue to monitor with imaging planned for prostate cancer.      5. Sensory neuropathy, Toes/feet Grade 2  - continue monitor for worsening with cabazitaxel.   - Continue acupuncture once weekly. Patient working with insurance to try to increase to twice a week.   - He is on gabapentin managed by palliative care 300 mg in the am, 300 mg in the afternoon, and 600 mg at HS. Discussed with Walter increasing his afternoon dose of gabapentin from 300 mg to 600 mg. He will try this and he will update palliative care as well.      6. Anemia  - likely secondary to chemotherapy. Will recheck iron stores today. If low, will have patient increase his iron to three times a week instead  of every 5 days.     7. Left lower extremity swelling   - US 4/22/24 negative for DVT. CT abdomen pelvis 4/29 without significant or new lymphadenopathy. Continue compression stocking. Will refer to lymphedema as therapy and trial of low dose furosemide especially while traveling. Will proceed with an echo today as well.     PLAN:   Proceed with C5 cabazitaxel/carboplatin today with a continued 20% dose reduction of cabazitaxel.   Continue prednisone daily while on chemotherapy.   Keep dexamethasone on hand for pain flares.    Return 1 week for zometa and in 3 weeks for next cycle (C6) as scheduled.   Echo today.   Scans and Dr. Mcnair in June.     40 minutes spent on the date of the encounter doing chart review, review of test results, interpretation of tests, patient visit, and documentation     Yamilet Espinoza PA-C

## 2024-05-13 ENCOUNTER — APPOINTMENT (OUTPATIENT)
Dept: LAB | Facility: CLINIC | Age: 64
End: 2024-05-13
Attending: STUDENT IN AN ORGANIZED HEALTH CARE EDUCATION/TRAINING PROGRAM
Payer: COMMERCIAL

## 2024-05-13 ENCOUNTER — ONCOLOGY VISIT (OUTPATIENT)
Dept: ONCOLOGY | Facility: CLINIC | Age: 64
End: 2024-05-13
Attending: STUDENT IN AN ORGANIZED HEALTH CARE EDUCATION/TRAINING PROGRAM
Payer: COMMERCIAL

## 2024-05-13 ENCOUNTER — ANCILLARY PROCEDURE (OUTPATIENT)
Dept: CARDIOLOGY | Facility: CLINIC | Age: 64
End: 2024-05-13
Payer: COMMERCIAL

## 2024-05-13 VITALS
OXYGEN SATURATION: 97 % | DIASTOLIC BLOOD PRESSURE: 53 MMHG | BODY MASS INDEX: 35.2 KG/M2 | RESPIRATION RATE: 16 BRPM | HEIGHT: 72 IN | SYSTOLIC BLOOD PRESSURE: 115 MMHG | WEIGHT: 259.9 LBS | TEMPERATURE: 98.4 F | HEART RATE: 65 BPM

## 2024-05-13 DIAGNOSIS — Z76.89 PREVENTION OF CHEMOTHERAPY-INDUCED NEUTROPENIA: ICD-10-CM

## 2024-05-13 DIAGNOSIS — C61 MALIGNANT NEOPLASM OF PROSTATE METASTATIC TO BONE (H): ICD-10-CM

## 2024-05-13 DIAGNOSIS — C79.51 MALIGNANT NEOPLASM OF PROSTATE METASTATIC TO BONE (H): Primary | ICD-10-CM

## 2024-05-13 DIAGNOSIS — R60.0 PERIPHERAL EDEMA: ICD-10-CM

## 2024-05-13 DIAGNOSIS — C61 MALIGNANT NEOPLASM OF PROSTATE METASTATIC TO BONE (H): Primary | ICD-10-CM

## 2024-05-13 DIAGNOSIS — C79.51 MALIGNANT NEOPLASM OF PROSTATE METASTATIC TO BONE (H): ICD-10-CM

## 2024-05-13 LAB
ALBUMIN SERPL BCG-MCNC: 4 G/DL (ref 3.5–5.2)
ALP SERPL-CCNC: 76 U/L (ref 40–150)
ALT SERPL W P-5'-P-CCNC: 19 U/L (ref 0–70)
ANION GAP SERPL CALCULATED.3IONS-SCNC: 10 MMOL/L (ref 7–15)
AST SERPL W P-5'-P-CCNC: 21 U/L (ref 0–45)
BASOPHILS # BLD AUTO: 0 10E3/UL (ref 0–0.2)
BASOPHILS NFR BLD AUTO: 0 %
BILIRUB SERPL-MCNC: 0.3 MG/DL
BUN SERPL-MCNC: 15.3 MG/DL (ref 8–23)
CALCIUM SERPL-MCNC: 8.6 MG/DL (ref 8.8–10.2)
CHLORIDE SERPL-SCNC: 109 MMOL/L (ref 98–107)
CREAT SERPL-MCNC: 0.95 MG/DL (ref 0.67–1.17)
DEPRECATED HCO3 PLAS-SCNC: 23 MMOL/L (ref 22–29)
EGFRCR SERPLBLD CKD-EPI 2021: 90 ML/MIN/1.73M2
EOSINOPHIL # BLD AUTO: 0 10E3/UL (ref 0–0.7)
EOSINOPHIL NFR BLD AUTO: 0 %
ERYTHROCYTE [DISTWIDTH] IN BLOOD BY AUTOMATED COUNT: 20.9 % (ref 10–15)
GLUCOSE SERPL-MCNC: 111 MG/DL (ref 70–99)
HCT VFR BLD AUTO: 30.3 % (ref 40–53)
HGB BLD-MCNC: 9.7 G/DL (ref 13.3–17.7)
IMM GRANULOCYTES # BLD: 0 10E3/UL
IMM GRANULOCYTES NFR BLD: 1 %
LVEF ECHO: NORMAL
LYMPHOCYTES # BLD AUTO: 0.8 10E3/UL (ref 0.8–5.3)
LYMPHOCYTES NFR BLD AUTO: 17 %
MCH RBC QN AUTO: 32.8 PG (ref 26.5–33)
MCHC RBC AUTO-ENTMCNC: 32 G/DL (ref 31.5–36.5)
MCV RBC AUTO: 102 FL (ref 78–100)
MONOCYTES # BLD AUTO: 0.6 10E3/UL (ref 0–1.3)
MONOCYTES NFR BLD AUTO: 13 %
NEUTROPHILS # BLD AUTO: 3.3 10E3/UL (ref 1.6–8.3)
NEUTROPHILS NFR BLD AUTO: 69 %
NRBC # BLD AUTO: 0 10E3/UL
NRBC BLD AUTO-RTO: 0 /100
PLATELET # BLD AUTO: 155 10E3/UL (ref 150–450)
POTASSIUM SERPL-SCNC: 3.9 MMOL/L (ref 3.4–5.3)
PROT SERPL-MCNC: 6.1 G/DL (ref 6.4–8.3)
PSA SERPL DL<=0.01 NG/ML-MCNC: 2.06 NG/ML (ref 0–4.5)
RBC # BLD AUTO: 2.96 10E6/UL (ref 4.4–5.9)
SODIUM SERPL-SCNC: 142 MMOL/L (ref 135–145)
WBC # BLD AUTO: 4.7 10E3/UL (ref 4–11)

## 2024-05-13 PROCEDURE — 85004 AUTOMATED DIFF WBC COUNT: CPT

## 2024-05-13 PROCEDURE — 99215 OFFICE O/P EST HI 40 MIN: CPT

## 2024-05-13 PROCEDURE — 250N000011 HC RX IP 250 OP 636

## 2024-05-13 PROCEDURE — 96377 APPLICATON ON-BODY INJECTOR: CPT | Mod: 59

## 2024-05-13 PROCEDURE — 82040 ASSAY OF SERUM ALBUMIN: CPT

## 2024-05-13 PROCEDURE — 96367 TX/PROPH/DG ADDL SEQ IV INF: CPT

## 2024-05-13 PROCEDURE — 96417 CHEMO IV INFUS EACH ADDL SEQ: CPT

## 2024-05-13 PROCEDURE — 258N000003 HC RX IP 258 OP 636

## 2024-05-13 PROCEDURE — 93306 TTE W/DOPPLER COMPLETE: CPT | Performed by: INTERNAL MEDICINE

## 2024-05-13 PROCEDURE — 99213 OFFICE O/P EST LOW 20 MIN: CPT

## 2024-05-13 PROCEDURE — 36591 DRAW BLOOD OFF VENOUS DEVICE: CPT

## 2024-05-13 PROCEDURE — 96375 TX/PRO/DX INJ NEW DRUG ADDON: CPT

## 2024-05-13 PROCEDURE — 96372 THER/PROPH/DIAG INJ SC/IM: CPT

## 2024-05-13 PROCEDURE — 96413 CHEMO IV INFUSION 1 HR: CPT

## 2024-05-13 PROCEDURE — 84403 ASSAY OF TOTAL TESTOSTERONE: CPT

## 2024-05-13 PROCEDURE — 84153 ASSAY OF PSA TOTAL: CPT

## 2024-05-13 RX ORDER — HEPARIN SODIUM (PORCINE) LOCK FLUSH IV SOLN 100 UNIT/ML 100 UNIT/ML
5 SOLUTION INTRAVENOUS ONCE
Status: COMPLETED | OUTPATIENT
Start: 2024-05-13 | End: 2024-05-13

## 2024-05-13 RX ORDER — MEPERIDINE HYDROCHLORIDE 25 MG/ML
25 INJECTION INTRAMUSCULAR; INTRAVENOUS; SUBCUTANEOUS EVERY 30 MIN PRN
Status: CANCELLED | OUTPATIENT
Start: 2024-05-13

## 2024-05-13 RX ORDER — HEPARIN SODIUM,PORCINE 10 UNIT/ML
5-20 VIAL (ML) INTRAVENOUS DAILY PRN
Status: CANCELLED | OUTPATIENT
Start: 2024-05-13

## 2024-05-13 RX ORDER — PREDNISONE 5 MG/1
5 TABLET ORAL 2 TIMES DAILY WITH MEALS
Qty: 42 TABLET | Refills: 0 | Status: SHIPPED | OUTPATIENT
Start: 2024-05-13 | End: 2024-06-03

## 2024-05-13 RX ORDER — DIPHENHYDRAMINE HYDROCHLORIDE 50 MG/ML
50 INJECTION INTRAMUSCULAR; INTRAVENOUS
Status: CANCELLED
Start: 2024-05-13

## 2024-05-13 RX ORDER — PALONOSETRON 0.05 MG/ML
0.25 INJECTION, SOLUTION INTRAVENOUS ONCE
Status: COMPLETED | OUTPATIENT
Start: 2024-05-13 | End: 2024-05-13

## 2024-05-13 RX ORDER — LORAZEPAM 2 MG/ML
0.5 INJECTION INTRAMUSCULAR EVERY 4 HOURS PRN
Status: CANCELLED | OUTPATIENT
Start: 2024-05-13

## 2024-05-13 RX ORDER — HEPARIN SODIUM (PORCINE) LOCK FLUSH IV SOLN 100 UNIT/ML 100 UNIT/ML
5 SOLUTION INTRAVENOUS
Status: CANCELLED | OUTPATIENT
Start: 2024-05-13

## 2024-05-13 RX ORDER — EPINEPHRINE 1 MG/ML
0.3 INJECTION, SOLUTION INTRAMUSCULAR; SUBCUTANEOUS EVERY 5 MIN PRN
Status: CANCELLED | OUTPATIENT
Start: 2024-05-13

## 2024-05-13 RX ORDER — ALBUTEROL SULFATE 0.83 MG/ML
2.5 SOLUTION RESPIRATORY (INHALATION)
Status: CANCELLED | OUTPATIENT
Start: 2024-05-13

## 2024-05-13 RX ORDER — FUROSEMIDE 20 MG
20 TABLET ORAL DAILY
Qty: 15 TABLET | Refills: 1 | Status: SHIPPED | OUTPATIENT
Start: 2024-05-13

## 2024-05-13 RX ORDER — METHYLPREDNISOLONE SODIUM SUCCINATE 125 MG/2ML
125 INJECTION, POWDER, LYOPHILIZED, FOR SOLUTION INTRAMUSCULAR; INTRAVENOUS
Status: CANCELLED
Start: 2024-05-13

## 2024-05-13 RX ORDER — ALBUTEROL SULFATE 90 UG/1
1-2 AEROSOL, METERED RESPIRATORY (INHALATION)
Status: CANCELLED
Start: 2024-05-13

## 2024-05-13 RX ORDER — HEPARIN SODIUM (PORCINE) LOCK FLUSH IV SOLN 100 UNIT/ML 100 UNIT/ML
5 SOLUTION INTRAVENOUS
Status: DISCONTINUED | OUTPATIENT
Start: 2024-05-13 | End: 2024-05-13 | Stop reason: HOSPADM

## 2024-05-13 RX ORDER — PALONOSETRON 0.05 MG/ML
0.25 INJECTION, SOLUTION INTRAVENOUS ONCE
Status: CANCELLED | OUTPATIENT
Start: 2024-05-13

## 2024-05-13 RX ADMIN — FOSAPREPITANT: 150 INJECTION, POWDER, LYOPHILIZED, FOR SOLUTION INTRAVENOUS at 10:54

## 2024-05-13 RX ADMIN — FAMOTIDINE 20 MG: 10 INJECTION INTRAVENOUS at 10:39

## 2024-05-13 RX ADMIN — PEGFILGRASTIM 6 MG: KIT SUBCUTANEOUS at 12:41

## 2024-05-13 RX ADMIN — PALONOSETRON HYDROCHLORIDE 0.25 MG: 0.25 INJECTION INTRAVENOUS at 10:41

## 2024-05-13 RX ADMIN — Medication 5 ML: at 09:17

## 2024-05-13 RX ADMIN — SODIUM CHLORIDE 40 MG: 9 INJECTION, SOLUTION INTRAVENOUS at 11:21

## 2024-05-13 RX ADMIN — Medication 6 ML: at 14:58

## 2024-05-13 RX ADMIN — SODIUM CHLORIDE 250 ML: 9 INJECTION, SOLUTION INTRAVENOUS at 10:39

## 2024-05-13 RX ADMIN — DIPHENHYDRAMINE HYDROCHLORIDE 25 MG: 50 INJECTION, SOLUTION INTRAMUSCULAR; INTRAVENOUS at 10:43

## 2024-05-13 RX ADMIN — Medication 5 ML: at 12:52

## 2024-05-13 RX ADMIN — CARBOPLATIN 550 MG: 10 INJECTION, SOLUTION INTRAVENOUS at 12:22

## 2024-05-13 ASSESSMENT — PAIN SCALES - GENERAL: PAINLEVEL: NO PAIN (0)

## 2024-05-13 NOTE — PROGRESS NOTES
Infusion Nursing Note:  Walter Reyes presents today for Cycle 5, day 1 Jevtana and Carboplatin.    Patient seen by provider today: Yes: KADEN Pereira    Note: Patient presents to clinic today feeling well with no questions.  Pt would like to proceed with therapy today. Pt did not request or require any intervention for pain today.    Pt reports taking 500mg Calicum with 800 units Vitamin D twice daily as well at 1000 units Vitamin D once a day.      Written communication 5/13/2024 KADEN Bolaños/BARBARA Morales RN: Increase to Calcium 1000mg BID PO and return next week for Zometa only.    Pt and pt's wife verbalized understanding and comfort with POC. Scheduling request sent for [place] per pt request and requested [facility] follow up.    Intravenous Access:  Implanted Port.    Treatment Conditions:  Lab Results   Component Value Date    HGB 9.7 (L) 05/13/2024    WBC 4.7 05/13/2024    ANEU 5.0 07/07/2021    ANEUTAUTO 3.3 05/13/2024     05/13/2024        Lab Results   Component Value Date     05/13/2024    POTASSIUM 3.9 05/13/2024    MAG 1.9 08/25/2023    CR 0.95 05/13/2024    BETHANY 8.6 (L) 05/13/2024    BILITOTAL 0.3 05/13/2024    ALBUMIN 4.0 05/13/2024    ALT 19 05/13/2024    AST 21 05/13/2024     Results reviewed, labs MET treatment parameters, ok to proceed with treatment.    Post Infusion Assessment:  Patient tolerated infusion without incident.  Blood return noted pre and post infusion.  Site patent and intact, free from redness, edema or discomfort.  No evidence of extravasations.  Access discontinued per protocol.    Neulasta Onpro On-Body injector applied to LLQ of abdomen at 1245 with light facing up/toward navel.  Writer discussed Neulasta injection would start on 5/14/2024 at 1545, approximately 27 hours after application applied today.  Written and Verbal instruction reviewed with patient.  Pt instructed when the dose delivery starts, it will take about 45 minutes to complete.  Pt  aware Neulasta Onpro On-Body should have green flashing light and to call triage or on-call MD if injector flashes red or appears to be leaking. Pt aware to keep Onpro On-Body Neualsta 4 inches away from electrical equipment and to avoid showering 4 hours prior to injection.   Neulasta Onpro Lot number: [id number]    Discharge Plan:   Patient declined prescription refills; will  Prednisone and Lasix following ECHO.  Discharge instructions reviewed with: Patient.  Patient and/or family verbalized understanding of discharge instructions and all questions answered.  AVS to patient via [employer].  Patient will return 6/3/2024 for next chemotherapy appointment.   Patient discharged in stable condition accompanied by: wife.  Departure Mode: Ambulatory.    Marietta Morales RN

## 2024-05-13 NOTE — Clinical Note
5/13/2024         RE: Walter Reyes  [address] [facility] [address]        Dear Colleague,    Thank you for referring your patient, Walter Reyes, to the [facility]. Please see a copy of my visit note below.        [facility] Oncology Followup  May 13, 2024    REASON FOR VISIT: Follow-up for metastatic castration-resistant prostate cancer.      INTERVAL HISTORY:   Walter returns to clinic in consideration of cycle 5 carboplatin/cabazitaxel. Patient reports cycle 4 went well. He had his typical pattern of fatigue and nausea the first week with improvement into the second. This past week he felt very well and was able to spend time outside doing yard work. He uses compazine as needed the first week and zofran in addition as needed. He continues to have a couple of days of diarrhea followed by constipation for a few days before his bowels return to normal. No chest pain, shortness of breath, or cough. No fevers or chills. His is getting accupuncture once a week and has been working with Dr. Mcnair on a prior auth to try to increase this to twice a week. He has noticed improvement in his neuropathy he has less pain and numbness into his feet. Primarily now noticing the neuropathy in his toes. He did have one fall this past cycle after tripping over a hose but otherwise no significant falls or balance changes. He was uninjured in the fall. He endorses continued left sided lower extremity swelling possibly mildly improved from before but still quite bothersome especially swelling up into the thigh.     Review of Systems:   ROS: 10 point ROS neg other than the symptoms noted above in the HPI.    ONCOLOGY HISTORY: Mr. Walter Reyes is a 64 year old gentleman with a metastatic castration-sensitive prostate cancer and incidentally diagnosed stage 3 clear cell RCC (s/p radical R nephrectomy on 3/19/19) which is now 4.5+ years post-therapy without  "evidence of recurrence. His oncologic history is detailed below.     1. 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 Naylor at [facility]. Prior PSA were 1.4 on 8/16/17, 2.4 on 6/28/16, 2.9 on 6/28/16, and as low as 0.4 on 3/26/2003.   - 1/08/2019: Consultation with Sarah Wilson CNP in Urology clinic. Repeat PSA 9.6.  - 1/16/2019: MRI prostate with contrast - \"This examination is characterized as PIRADS 5- very high probability. Clinically significant cancer is highly likely to be present. There is a large, invasive mass arising from the right peripheral zone and extending into the neurovascular bundle, seminal vesicle, and along the anterolateral right mesorectal fascia. Metastatic right external iliac lymph node. Metastatic lesion in the posterior/superior right acetabulum.\"  - 1/25/2019: CT abdomen and pelvis with IV contrast - \"1. Heterogeneous enhancing mass posteriorly in the upper pole of the right kidney measures 4.5 x 5.8 x 5.7 cm (AP by transverse by craniocaudal). It has a small nodular component extending posterior medially which abuts the right psoas muscle. This nodular extension measures 2.1 x 2.1 cm. Minimal stranding about the mass. No definite thrombus within the right renal vein. This renal mass is compatible with a renal cell carcinoma. A paraaortic lymph node situated immediately posterior to the left renal vein measures 1.1 cm in short axis, suspicious for metastasis. 2. A 2 cm right external iliac lymph node is also suspicious for metastases. 3. Multiple sclerotic osseous lesions suspicious for metastases. These include 0.8 and 0.6 cm sclerotic lesions laterally in the right iliac wing (images 53 and 62 respectively). Ill-defined groundglass density laterally in the right acetabulum measuring 1.7 x 1.2 cm corresponds " "with the lesion identified on MRI. A 0.4 cm groundglass density in the left acetabulum. Sclerotic lesion in the left femoral neck measures 0.9 x 1.6 cm (image 81). Sclerotic metastases would be more compatible with prostate metastases. 4. A 3 subcentimeter hepatic lesions are indeterminate. Metastases would be a consideration. These can be further characterized with liver MRI.\"  - 1/25/2019: NM bone scan - \"There is focal bony uptake in the left femoral neck, right acetabulum, right of midline at the S1 or L5 level of the spine, within multiple bilateral ribs, in the C7 or T1 level of the spine, and anteriorly within the skull. Findings are suspicious for metastases.\"  - 1/31/19: CT chest with contrast - \" No suspicious nodules in the chest.  Stable appearance of a 5.8 cm right renal mass concerning for renal carcinoma until proven otherwise.  Stable indeterminant subcentimeter hypodensities in the liver.  Multifocal osteoblastic metastasis including 2.5 cm lesion in the right fifth rib posteriorly and a 1.6 cm lesion in the right third rib posteriorly. No lytic lesions identified.\"  - 2/6/19: CT-guided right sclerotic fifth rib lesion biopsy - \"Metastatic carcinoma, consistent with prostate primary.  Immunohistochemical stains performed show the metastatic carcinoma stains positive for NKX3.1 (prostate marker), negative for STACIE 3 and PAX8, which supports the above diagnosis.\"  - 2/15/19: Started Casodex 50mg every day and consented for the biobanking protocol. 3/18/19 - stopped Casodex.  - 2/19/19: Case discussed in tumor board - recommendation for nephectomy for suspected malignant right renal mass.   - 2/26/19: Started Lupron 22.5mg every 3 months.   - 5/8/19: Started Docetaxel 75mg/m2 IV every 3 weeks. 06/18/19 - cycle 3, 7/10/19 - cycle 4, 07/31/19 - cycle 5, 08/21/19 - cycle 6.   - 10/2/19: Restaging CT CAP and NM Bone Scan showed improved osseous disease, no evidence of recurrent or new metastatic " "disease.  - 1/5/20: Restaging CT CAP and NM Bone Scan showed stable osseous disease, no evidence of recurrent or new metastatic disease.  - 4/6/20: NM bone scan with slightly improved uptake in known skeletal mets. CT C/A/P with contrast with stable osseous mets, no yusuf enlargement, no new visceral mets etc.  - 04/08/20: Zometa every 3 months.  - 10/5/20: CT C/A/P with contrast and NM bone scan - SD.   - 1/4/21: CT C/A/P with contrast and NM bone scan - SD but slight increase in right 5th rib tracer uptake and in posterior L5 sclerosis. 1/6/21  PSA = 0.29  - 03/29/21: CT C/A/P with contrast - single new right sacral 8mm bone lesion (suspicious), other bone mets stable. No visceral/yusuf disease. Nephrectomy site without recurrence. NM bone scan - SD but \"increased uptake associated with the prior lesions of the lower  cervical spine, posterior right fourth rib and right L5 posterior arch elements. Otherwise unchanged uptake associated with the proximal left femur and left anterior fourth rib. Similar uptake of the left halux, possibly secondary to degenerative osteoarthritis or gout.\"    3/31/21 PSA = 0.44  7/7/21  PSA = 0.47  11/2021 PSA = 1.05  -- Start XTANDI  12/16/21 PSA =0.22  2/11/22 PSA= 0.50  3/22/22 PSA=0.72  5/5/2022 PSA=1.79  7/11/2022 PSA=2.16 --Start cycle 1 docetaxel   8/22/22 PSA = 1.36  9/12/22 PSA = 1.09  10/24/22 Cycle #6 of Docetaxel. End of treatment  1/9/23  PSA =0.66  3/20/23  PSA=1.54  4/21/23 PSA = 1.81  T=15  5/22/23 C1 Pluvicto   06/07/23          PSA = 1.42  7/18/23 PSA=1.75, C2 Pluvicto   8/28/23 Transfer of care initial visit for Zorko.  PSA 2.06.  C3 Pluvicto scheduled for 8/30.  Proceed with dose as planned.  MR L femur, ortho referral, PSMA PET ordered for prior to follow-up.    10/2/23 Palliative RT to L femur metastasis complete.  PSMA PET with mixed response.  PSA 2.21.  RT to L femur pending.  Continue with Dose #4 of Pluvicto despite mixed response to therapy.  Dex course for " "possible pain flare with RT.    11/8/23  Follow-up prior to Dose #5 Pluvicto.  More pain in chest/ribs/back. PSA 4.38.  Testosterone=3.  Rad onc referral for ribs.  Biopsy progressive lesion for progression on Pluvicto.  Cabazitaxel scheduled for follow-up appointment.  Tempus blood testing unrevealing for targetable mutation.    11/27/23 Bx completed from R iliac crest but unrevealing for either PCa or RCC.  Start cabazitaxel C1D1.  PSA 2.81.    12/19/23 PSA = 3.66  1/8/24 PSA = 3.55  1/29/24 PSA = 3.07  2/19/24 PSA= 2.21-Start carboplatin/cabazitaxel.    3/11/24 PSA= C2 Carbo/cabazi.  PSA 1.99.    4/01/24 PSA= 1.74. C3 Carbo/cabazi.   4/22/24 PSA= 1.85. C4 Carbo/cabazi.   5/13/24 PSA = pending. C5 Carbo/cabazi.       Radiation history:   -C7         3,000 cGy       06 X      8/05/2021        8/18/2021        13       10  -2 Sacrum          2,500 cGy       18 X      4/12/2022        4/18/2022         6           -Sacrum retreat               700 cGy        18 X      2/28/2023        2/28/2023  -Left femur to 2000 cGy in 5 fractions, completed 10/10/2023   -bilateral posterior chest wall at an area of osseous involvement of the ribs and adjacent T4 and 5 spine, to be delivered to 2000 cGy in 5 fractions.  completed 12/13-12/19/23.      2. Stage III ([id number]), grade 3 of 4, clear-cell RCC of right kidney:  - Incidentally diagnosed as above.   - Underwent curative-intent robotic right radical nephrectomy with Dr. Wesley Cleveland on 3/19/19. No tumor spillage per op report.   - Path showed: \"Histologic Type: Clear cell renal cell carcinoma; Sarcomatoid Features: Not identified; Histologic Grade: Nucleolar grade 3 (WHO/ISUP). Extent Tumor Size: 4.3 cm. Microscopic Tumor Extension: Tumor extension into renal sinus (in vascular structures). Margins: Negative. Tumor Necrosis: Present; focal. Lymph-Vascular Invasion: Not identified.  Pathologic Staging (pTNM) Primary Tumor (pT): pT3a: Tumor extends into renal vein " "branches/renal sinus. Regional Lymph Nodes (pN): pNX. Number of Lymph Nodes Examined: 0 Distant Metastasis (pM): pM N/A.\"  - Restaging scans as above.  No current concerns for recurrence.      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 Take with food. (Patient not taking: Reported on 3/28/2024), Disp: 6 tablet, Rfl: 2    dexAMETHasone (DECADRON) 4 MG tablet, Take 1 tablet (4 mg) by mouth daily (Patient not taking: Reported on 3/28/2024), Disp: 7 tablet, Rfl: 2    fentaNYL (DURAGESIC) 25 mcg/hr 72 hr patch, Place 1 patch onto the skin every 72 hours remove old patch., Disp: 10 patch, Rfl: 0    gabapentin (NEURONTIN) 300 MG capsule, Take 1 capsule (300 mg) by mouth every morning AND 2 capsules (600 mg) daily at 2 pm AND 2 capsules (600 mg) at bedtime., Disp: 150 capsule, Rfl: 3    levothyroxine (SYNTHROID/LEVOTHROID) 112 MCG tablet, Take 112 mcg by mouth daily, Disp: , Rfl:     lidocaine, viscous, (XYLOCAINE) 2 % solution, Swish and swallow 15 mLs in mouth every 3 hours as needed for pain (before meals and at bedtime) ; Max 8 doses/24 hour period. (Patient not taking: Reported on 3/28/2024), Disp: 100 mL, Rfl: 1    loratadine (CLARITIN) 10 MG tablet, Take 10 mg by mouth daily, Disp: , Rfl:     Multiple Vitamins-Minerals (MULTIVITAMIN ADULT " "EXTRA C PO), Take 1 tablet by mouth every 24 hours, Disp: , Rfl:     naloxone (NARCAN) 4 MG/0.1ML nasal spray, Spray 1 spray (4 mg) into one nostril alternating nostrils as needed for opioid reversal every 2-3 minutes until assistance arrives, Disp: 0.2 mL, Rfl: 1    Nutritional Supplements (SALMON OIL) CAPS, Take 2 capsules by mouth daily , Disp: , Rfl:     omeprazole (PRILOSEC) 20 MG DR capsule, Take 20 mg by mouth daily, Disp: , Rfl:     ondansetron (ZOFRAN) 8 MG tablet, Take 1 tablet (8 mg) by mouth every 8 hours as needed for nausea (vomiting), Disp: 30 tablet, Rfl: 2    ondansetron (ZOFRAN) 8 MG tablet, Take 1 tablet (8 mg) by mouth every 8 hours as needed for nausea, Disp: 30 tablet, Rfl: 4    oxyCODONE (ROXICODONE) 5 MG tablet, Take 1-2 tablets (5-10 mg) by mouth every 3 hours as needed for severe pain, Disp: 90 tablet, Rfl: 0    predniSONE (DELTASONE) 5 MG tablet, Take 1 tablet (5 mg) by mouth 2 times daily (with meals) for 21 days, Disp: 42 tablet, Rfl: 0    prochlorperazine (COMPAZINE) 10 MG tablet, Take 1 tablet (10 mg) by mouth every 6 hours as needed for nausea or vomiting, Disp: 30 tablet, Rfl: 2    tamsulosin (FLOMAX) 0.4 MG capsule, Take 1 capsule (0.4 mg) by mouth daily, Disp: 30 capsule, Rfl: 3    triamterene-HCTZ (MAXZIDE-25) 37.5-25 MG tablet, Take 1 tablet by mouth daily (Patient not taking: Reported on 12/19/2023), Disp: , Rfl:       Physical Exam:   /53   Pulse 65   Temp 98.4  F (36.9  C) (Oral)   Resp 16   Ht 1.82 m (5' 11.65\")   Wt 117.9 kg (259 lb 14.4 oz)   SpO2 97%   BMI 35.59 kg/m    Wt Readings from Last 10 Encounters:   04/22/24 118.3 kg (260 lb 14.4 oz)   04/01/24 119.4 kg (263 lb 3.2 oz)   03/28/24 116.1 kg (256 lb)   03/11/24 119.3 kg (263 lb 1.6 oz)   02/19/24 121.2 kg (267 lb 4.8 oz)   02/13/24 121.4 kg (267 lb 11.2 oz)   01/29/24 122.7 kg (270 lb 6.4 oz)   01/24/24 117.9 kg (260 lb)   01/08/24 119.6 kg (263 lb 9.6 oz)   12/20/23 117.9 kg (260 lb)   General: The " patient is a pleasant male in no acute distress.  HEENT: EOMI. Sclerae are anicteric. Mucous membranes moist without lesions or thrush.   Lymph: Neck is supple with no lymphadenopathy in the cervical or supraclavicular areas.   Heart: Regular rate and rhythm.   Lungs: Clear to auscultation bilaterally.   Abdomen: Bowel sounds present, soft, nontender with no palpable hepatosplenomegaly or masses.   Extremities: 1+ pitting lower extremity edema in the left leg into the thigh, trace right sided edema.   Neuro: Cranial nerves II through XII are grossly intact.  Skin: No rashes, petechiae, or bruising noted on exposed skin.  Psych: affect bright, alert and oriented    LABORATORY DATA:  Most Recent 3 CBC's:  Recent Labs   Lab Test 05/13/24  0916 04/22/24  0909 04/01/24  0930   WBC 4.7 4.4 4.6   HGB 9.7* 9.9* 10.7*   * 99 98    166 160   ANEUTAUTO 3.3 3.0 3.0     Most Recent 3 BMP's:  Recent Labs   Lab Test 05/13/24  0916 04/22/24  0909 04/01/24  0930    143 141   POTASSIUM 3.9 4.0 3.9   CHLORIDE 109* 108* 106   CO2 23 25 24   BUN 15.3 14.3 12.4   CR 0.95 0.92 0.92   ANIONGAP 10 10 11   BETHANY 8.6* 8.6* 8.6*   * 92 94   PROTTOTAL 6.1* 6.0* 6.3*   ALBUMIN 4.0 4.0 4.1    Most Recent 3 LFT's:  Recent Labs   Lab Test 05/13/24  0916 04/22/24  0909 04/01/24  0930   AST 21 19 19   ALT 19 22 20   ALKPHOS 76 74 82   BILITOTAL 0.3 0.4 0.3     US left lower extremity 4/22/24:   FINDINGS:  In the left lower extremity, the common femoral, femoral, popliteal  and posterior tibial veins demonstrate normal compressibility and  blood flow. The peroneal vein is not visualized due to swelling.     The right common femoral vein obtained for comparison is unremarkable.     Subcutaneous edema.     CT Abdomen/pelvis with contrast 4/29/24:                                                                  IMPRESSION:  No evidence left lower extremity deep venous thrombosis. Of note, the  left peroneal vein is not  "visualized.FINDINGS:   LOWER CHEST: Moderate to large hiatal hernia.     HEPATOBILIARY: Hepatic steatosis. No hepatic masses. No calcified  gallstones.     PANCREAS: Normal.     SPLEEN: Normal.     ADRENAL GLANDS: Normal.     KIDNEYS/BLADDER: Right nephrectomy. Nonobstructing 0.2 cm stone in the  lower pole of the left kidney. No hydronephrosis.     BOWEL: No bowel obstruction. Colonic diverticulosis. No evidence for  colitis or diverticulitis. The appendix is mildly thickened at 0.8 cm,  and there is minimal periappendiceal fat stranding.     LYMPH NODES: No lymphadenopathy.     VASCULATURE: Mild atherosclerotic aortoiliac calcification.     PELVIC ORGANS: Mild prostatic enlargement and heterogeneity,  unchanged.     ADDITIONAL FINDINGS: Small fat-containing paraumbilical hernia.     MUSCULOSKELETAL: Scattered sclerotic metastases throughout the  visualized bones are not significantly changed.                                                                      IMPRESSION:   1.  The appendix is mildly thickened at 0.8 cm, and there is minimal  periappendiceal fat stranding. This finding is of uncertain clinical  significance, although a mild or early appendicitis could have this  appearance.  2.  Scattered sclerotic metastases throughout the visualized bones are  not significantly changed.  3.  Hepatic steatosis.  4.  Moderate to large hiatal hernia.    I reviewed the above labs and imaging today.    PSA trend, see oncology history.      ASSESSMENT & PLAN: Mr. Reyes is a 64 year old gentleman with metastatic castration sensitive prostate adenocarcinoma as well as an incidentally diagnosed stage III clear-cell renal cell carcinoma of the right kidney (s/p radical R nephrectomy 3/19/19), who is here prior to cycle 3 cabazitaxel.      1. Metastatic castration-resistant prostate cancer, with involvement of the bones and RPLN:   - Patient met the criteria for CHAARTED \"high-volume\" metastatic hormone-sensitive " prostate cancer. He opted for docetaxel in combination with ADT (per JOSEFA, [id number], KARLA). He was subsequently treated with docetaxel x6 doses in the [facility] setting and enzalutamide.  He initiated Pluvicto in May 2023 with an initial slight decline in PSA, but this began to rise just prior to Cycle 3.  Given his XR evidence of progression in L femur and PSA rise, we re-evaluated his progression with PSMA PET scan in September 2023, with note of mixed response.  Reconsented for  [employer] on 8/28/23.  Due for 3 month collection at end of November 2023.  Canceled Pluvicto Dose for November due to symptomatic progression with PSA rise. PSA is labile. Biopsy results from 11/21 are pending for evaluation of NEPC vs small cell given progression without a significant rise in PSA. [facility] peripheral blood mutation analysis is unrevealing for targetable mutations.   -Last lupron 4/22/24. Zometa was held due to mild hypocalcemia on 4/22/24. Increased PO calcium supplement and post-pone 5/13/24. ext due 4/22/2024 along with Lupron.    -Palliative to manage further opiate refills. Pain is well controlled with fentanyl patch.   -Dexamethasone burst for RT and one on hand if needed for pain flares.      -Cabazitaxel started 11/27/23 given progression on Pluvicto.  Continue Zofran and Compazine as needed, patient to call clinic if these are not sufficient in controlling nausea.     -Started carbo/cabazitaxel 2/19/24 with good tolerance with expectd fatigue and mild nausea. He is experiencing grade 2 neuropathy worsened with cycle 3. Continue prednisone daily while on cabazitaxel and Neulasta.    -Will proceed with a 20% dose reduction of cabazitaxel with cycle 4 ptday 4/22/24 due to increased neuropathy. If continues, could consider a dose reduction of carboplatin as well with cycle 5.     2. Pressure in chest, resolved:  Has an ongoing sensation of pressure/need to cough in his chest, primarily against his sternum. Vital  signs stable, negative pulmonary physical exam. No chest pain today.     3. Bone metastases:    - Noted to have osseous metastatic disease at the time of diagnosis. S/p radiation to C spine and sacrum (Re-irradiation to sacrum).   - worsening radiculopathy down starting in the low back and radiating down the left leg in June. MRI read was without new/acute abnormalities  - Pain continues to fluctuate but overall well controlled/managable   - pain mgmt per palliative care, pain is now well controlled with fentanyl patch.   - Encouraged to continue calcium and vitamin D supplementation; continue Zometa 4mg IV every ~3 months. Due to mild hypocalcemia today will hold zometa 4/22/24 and reschedule in 3 weeks. Increase calcium from 2 tablets at home to 3 tablets. Patient confirms dose is 600 mg tablets that he has at home.    - RT to L femur 10/2023.  RT for bilateral posterior ribs 12/2023 with Dr. Albert.  - Added to trial list for [id number].       4. Stage 3, UISS-high risk ccRCC: s/p R nephrectomy   - Nephrectomy 3/19/19 and noted incidentally.  He is now 4.5 years post-op without evidence for recurrence.   - At this point, there is a minimal risk of recurrence of his RCC given the time since surgery without the use of adjuvant immunotherapy. There is no suspicious adenopathy or other features on his most recent imaging studies.    - Will continue to monitor with imaging planned for prostate cancer.      5. Sensory neuropathy, Toes/feet Grade 2  - continue monitor for worsening with cabazitaxel.   - Continue acupuncture once weekly. Patient working with insurance to try to increase to twice a week.   - He is on gabapentin managed by palliative care 300 mg in the am, 300 mg in the afternoon, and 600 mg at HS. Discussed with Walter increasing his afternoon dose of gabapentin from 300 mg to 600 mg. He will try this and he will update palliative care as well.      6. Anemia  - likely secondary to chemotherapy. Will recheck  iron stores today. If low, will have patient increase his iron to three times a week instead of every 5 days.     7. Left lower extremity swelling   - proceed with LLE US to rule out clot. If swelling continues with negative study will consider a CT abdomen/pelvis to rule out bulky adenopathy.     PLAN:   Proceed with C4 cabazitaxel/carboplatin today with a 20% dose reduction of cabazitaxel.   Continue prednisone daily while on chemotherapy.   Keep dexamethasone on hand for pain flares.    Return in 3 weeks for next cycle (C5) as scheduled.   Infusion for Lupron with chemo today 4/22. Will delay zometa by 3 weeks until 5/13/24 with c5 chemotherapy.    Scans and Dr. Mcnair in June.   LLE US today.     42 minutes spent on the date of the encounter doing chart review, review of test results, interpretation of tests, patient visit, and documentation     Yamilet Espinoza PA-C              [facility] Oncology Followup  May 13, 2024    REASON FOR VISIT: Follow-up for metastatic castration-resistant prostate cancer.      INTERVAL HISTORY:   Walter returns to clinic in consideration of cycle 5 carboplatin/cabazitaxel. Patient reports cycle 4 went well. He had his typical pattern of fatigue and nausea the first week with improvement into the second. This past week he felt very well and was able to spend time outside doing yard work. He uses compazine as needed the first week and zofran in addition as needed. He continues to have a couple of days of diarrhea followed by constipation for a few days before his bowels return to normal. No chest pain, shortness of breath, or cough. No fevers or chills. His is getting accupuncture once a week and has been working with Dr. Mcnair on a prior auth to try to increase this to twice a week. He has noticed improvement in his neuropathy he has less pain and numbness into his feet. Primarily now noticing the neuropathy in his toes. He did have one fall this past cycle after tripping over a  "hose but otherwise no significant falls or balance changes. He was uninjured in the fall. He endorses continued left sided lower extremity swelling possibly mildly improved from before but still quite bothersome especially swelling up into the thigh.     Review of Systems:   ROS: 10 point ROS neg other than the symptoms noted above in the HPI.    ONCOLOGY HISTORY: Mr. 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 His oncologic history is detailed below.     1. 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 Naylor at [facility]. Prior PSA were 1.4 on 8/16/17, 2.4 on 6/28/16, 2.9 on 6/28/16, and as low as 0.4 on 3/26/2003.   - 1/08/2019: Consultation with Sarah Wilson CNP in Urology clinic. Repeat PSA 9.6.  - 1/16/2019: MRI prostate with contrast - \"This examination is characterized as PIRADS 5- very high probability. Clinically significant cancer is highly likely to be present. There is a large, invasive mass arising from the right peripheral zone and extending into the neurovascular bundle, seminal vesicle, and along the anterolateral right mesorectal fascia. Metastatic right external iliac lymph node. Metastatic lesion in the posterior/superior right acetabulum.\"  - 1/25/2019: CT abdomen and pelvis with IV contrast - \"1. Heterogeneous enhancing mass posteriorly in the upper pole of the right kidney measures 4.5 x 5.8 x 5.7 cm (AP by transverse by craniocaudal). It has a small nodular component extending posterior medially which abuts the right psoas muscle. This nodular extension measures 2.1 x 2.1 cm. Minimal stranding about " "the mass. No definite thrombus within the right renal vein. This renal mass is compatible with a renal cell carcinoma. A paraaortic lymph node situated immediately posterior to the left renal vein measures 1.1 cm in short axis, suspicious for metastasis. 2. A 2 cm right external iliac lymph node is also suspicious for metastases. 3. Multiple sclerotic osseous lesions suspicious for metastases. These include 0.8 and 0.6 cm sclerotic lesions laterally in the right iliac wing (images 53 and 62 respectively). Ill-defined groundglass density laterally in the right acetabulum measuring 1.7 x 1.2 cm corresponds with the lesion identified on MRI. A 0.4 cm groundglass density in the left acetabulum. Sclerotic lesion in the left femoral neck measures 0.9 x 1.6 cm (image 81). Sclerotic metastases would be more compatible with prostate metastases. 4. A 3 subcentimeter hepatic lesions are indeterminate. Metastases would be a consideration. These can be further characterized with liver MRI.\"  - 1/25/2019: NM bone scan - \"There is focal bony uptake in the left femoral neck, right acetabulum, right of midline at the S1 or L5 level of the spine, within multiple bilateral ribs, in the C7 or T1 level of the spine, and anteriorly within the skull. Findings are suspicious for metastases.\"  - 1/31/19: CT chest with contrast - \" No suspicious nodules in the chest.  Stable appearance of a 5.8 cm right renal mass concerning for renal carcinoma until proven otherwise.  Stable indeterminant subcentimeter hypodensities in the liver.  Multifocal osteoblastic metastasis including 2.5 cm lesion in the right fifth rib posteriorly and a 1.6 cm lesion in the right third rib posteriorly. No lytic lesions identified.\"  - 2/6/19: CT-guided right sclerotic fifth rib lesion biopsy - \"Metastatic carcinoma, consistent with prostate primary.  Immunohistochemical stains performed show the metastatic carcinoma stains positive for NKX3.1 (prostate marker), " "negative for STACIE 3 and PAX8, which supports the above diagnosis.\"  - 2/15/19: Started Casodex 50mg every day and consented for the biobanking protocol. 3/18/19 - stopped Casodex.  - 2/19/19: Case discussed in tumor board - recommendation for nephectomy for suspected malignant right renal mass.   - 2/26/19: Started Lupron 22.5mg every 3 months.   - 5/8/19: Started Docetaxel 75mg/m2 IV every 3 weeks. 06/18/19 - cycle 3, 7/10/19 - cycle 4, 07/31/19 - cycle 5, 08/21/19 - cycle 6.   - 10/2/19: Restaging CT CAP and NM Bone Scan showed improved osseous disease, no evidence of recurrent or new metastatic disease.  - 1/5/20: Restaging CT CAP and NM Bone Scan showed stable osseous disease, no evidence of recurrent or new metastatic disease.  - 4/6/20: NM bone scan with slightly improved uptake in known skeletal mets. CT C/A/P with contrast with stable osseous mets, no yusuf enlargement, no new visceral mets etc.  - 04/08/20: Zometa every 3 months.  - 10/5/20: CT C/A/P with contrast and NM bone scan - SD.   - 1/4/21: CT C/A/P with contrast and NM bone scan - SD but slight increase in right 5th rib tracer uptake and in posterior L5 sclerosis. 1/6/21  PSA = 0.29  - 03/29/21: CT C/A/P with contrast - single new right sacral 8mm bone lesion (suspicious), other bone mets stable. No visceral/yusuf disease. Nephrectomy site without recurrence. NM bone scan - SD but \"increased uptake associated with the prior lesions of the lower  cervical spine, posterior right fourth rib and right L5 posterior arch elements. Otherwise unchanged uptake associated with the proximal left femur and left anterior fourth rib. Similar uptake of the left halux, possibly secondary to degenerative osteoarthritis or gout.\"    3/31/21 PSA = 0.44  7/7/21  PSA = 0.47  11/2021 PSA = 1.05  -- Start XTANDI  12/16/21 PSA =0.22  2/11/22 PSA= 0.50  3/22/22 PSA=0.72  5/5/2022 PSA=1.79  7/11/2022 PSA=2.16 --Start cycle 1 docetaxel   8/22/22 PSA = 1.36  9/12/22 PSA = " 1.09  10/24/22 Cycle #6 of Docetaxel. End of treatment  1/9/23  PSA =0.66  3/20/23  PSA=1.54  4/21/23 PSA = 1.81  T=15  5/22/23 C1 Pluvicto   06/07/23          PSA = 1.42  7/18/23 PSA=1.75, C2 Pluvicto   8/28/23 Transfer of care initial visit for Tabby.  PSA 2.06.  C3 Pluvicto scheduled for 8/30.  Proceed with dose as planned.  MR L femur, ortho referral, PSMA PET ordered for prior to follow-up.    10/2/23 Palliative RT to L femur metastasis complete.  PSMA PET with mixed response.  PSA 2.21.  RT to L femur pending.  Continue with Dose #4 of Pluvicto despite mixed response to therapy.  Dex course for possible pain flare with RT.    11/8/23  Follow-up prior to Dose #5 Pluvicto.  More pain in chest/ribs/back. PSA 4.38.  Testosterone=3.  Rad onc referral for ribs.  Biopsy progressive lesion for progression on Pluvicto.  Cabazitaxel scheduled for follow-up appointment.  Tempus blood testing unrevealing for targetable mutation.    11/27/23 Bx completed from R iliac crest but unrevealing for either PCa or RCC.  Start cabazitaxel C1D1.  PSA 2.81.    12/19/23 PSA = 3.66  1/8/24 PSA = 3.55  1/29/24 PSA = 3.07  2/19/24 PSA= 2.21-Start carboplatin/cabazitaxel.    3/11/24 PSA= C2 Carbo/cabazi.  PSA 1.99.    4/01/24 PSA= 1.74. C3 Carbo/cabazi.   4/22/24 PSA= 1.85. C4 Carbo/cabazi.   5/13/24 PSA = pending. C5 Carbo/cabazi.       Radiation history:   -C7         3,000 cGy       06 X      8/05/2021        8/18/2021        13       10  -2 Sacrum          2,500 cGy       18 X      4/12/2022        4/18/2022         6           -Sacrum retreat               700 cGy        18 X      2/28/2023        2/28/2023  -Left femur to 2000 cGy in 5 fractions, completed 10/10/2023   -bilateral posterior chest wall at an area of osseous involvement of the ribs and adjacent T4 and 5 spine, to be delivered to 2000 cGy in 5 fractions.  completed 12/13-12/19/23.      2. Stage III ([id number]), grade 3 of 4, clear-cell RCC of right kidney:  -  "Incidentally diagnosed as above.   - Underwent curative-intent robotic right radical nephrectomy with Dr. Wesely Cleveland on 3/19/19. No tumor spillage per op report.   - Path showed: \"Histologic Type: Clear cell renal cell carcinoma; Sarcomatoid Features: Not identified; Histologic Grade: Nucleolar grade 3 (WHO/ISUP). Extent Tumor Size: 4.3 cm. Microscopic Tumor Extension: Tumor extension into renal sinus (in vascular structures). Margins: Negative. Tumor Necrosis: Present; focal. Lymph-Vascular Invasion: Not identified.  Pathologic Staging (pTNM) Primary Tumor (pT): pT3a: Tumor extends into renal vein branches/renal sinus. Regional Lymph Nodes (pN): pNX. Number of Lymph Nodes Examined: 0 Distant Metastasis (pM): pM N/A.\"  - Restaging scans as above.  No current concerns for recurrence.      PAST MEDICAL HISTORY:  Past Medical History:   Diagnosis Date     Cancer of kidney, right (H)      Complication of anesthesia     slow wakeup      Malignant neoplasm of prostate metastatic to bone (H) 2/14/2019     Thyroid disease      CURRENT MEDICATIONS:   Current Outpatient Medications:      atorvastatin (LIPITOR) 20 MG tablet, Take 60 mg by mouth daily , Disp: , Rfl:      calcium citrate-vitamin D (CITRACAL) 315-250 MG-UNIT TABS per tablet, Take 650 mg by mouth 2 times daily , Disp: , Rfl:      cycloSPORINE (RESTASIS) 0.05 % ophthalmic emulsion, Place 1 drop into both eyes 2 times daily , Disp: , Rfl:      dexAMETHasone (DECADRON) 4 MG tablet, Take 2 tablets (8 mg) by mouth daily Take for 3 days, starting the day after chemo. Take with food. (Patient not taking: Reported on 3/28/2024), Disp: 6 tablet, Rfl: 2     dexAMETHasone (DECADRON) 4 MG tablet, Take 1 tablet (4 mg) by mouth daily (Patient not taking: Reported on 3/28/2024), Disp: 7 tablet, Rfl: 2     fentaNYL (DURAGESIC) 25 mcg/hr 72 hr patch, Place 1 patch onto the skin every 72 hours remove old patch., Disp: 10 patch, Rfl: 0     gabapentin (NEURONTIN) 300 MG capsule, " Take 1 capsule (300 mg) by mouth every morning AND 2 capsules (600 mg) daily at 2 pm AND 2 capsules (600 mg) at bedtime., Disp: 150 capsule, Rfl: 3     levothyroxine (SYNTHROID/LEVOTHROID) 112 MCG tablet, Take 112 mcg by mouth daily, Disp: , Rfl:      lidocaine, viscous, (XYLOCAINE) 2 % solution, Swish and swallow 15 mLs in mouth every 3 hours as needed for pain (before meals and at bedtime) ; Max 8 doses/24 hour period. (Patient not taking: Reported on 3/28/2024), Disp: 100 mL, Rfl: 1     loratadine (CLARITIN) 10 MG tablet, Take 10 mg by mouth daily, Disp: , Rfl:      Multiple Vitamins-Minerals (MULTIVITAMIN ADULT EXTRA C PO), Take 1 tablet by mouth every 24 hours, Disp: , Rfl:      naloxone (NARCAN) 4 MG/0.1ML nasal spray, Spray 1 spray (4 mg) into one nostril alternating nostrils as needed for opioid reversal every 2-3 minutes until assistance arrives, Disp: 0.2 mL, Rfl: 1     Nutritional Supplements (SALMON OIL) CAPS, Take 2 capsules by mouth daily , Disp: , Rfl:      omeprazole (PRILOSEC) 20 MG DR capsule, Take 20 mg by mouth daily, Disp: , Rfl:      ondansetron (ZOFRAN) 8 MG tablet, Take 1 tablet (8 mg) by mouth every 8 hours as needed for nausea (vomiting), Disp: 30 tablet, Rfl: 2     ondansetron (ZOFRAN) 8 MG tablet, Take 1 tablet (8 mg) by mouth every 8 hours as needed for nausea, Disp: 30 tablet, Rfl: 4     oxyCODONE (ROXICODONE) 5 MG tablet, Take 1-2 tablets (5-10 mg) by mouth every 3 hours as needed for severe pain, Disp: 90 tablet, Rfl: 0     predniSONE (DELTASONE) 5 MG tablet, Take 1 tablet (5 mg) by mouth 2 times daily (with meals) for 21 days, Disp: 42 tablet, Rfl: 0     prochlorperazine (COMPAZINE) 10 MG tablet, Take 1 tablet (10 mg) by mouth every 6 hours as needed for nausea or vomiting, Disp: 30 tablet, Rfl: 2     tamsulosin (FLOMAX) 0.4 MG capsule, Take 1 capsule (0.4 mg) by mouth daily, Disp: 30 capsule, Rfl: 3     triamterene-HCTZ (MAXZIDE-25) 37.5-25 MG tablet, Take 1 tablet by mouth daily  "(Patient not taking: Reported on 12/19/2023), Disp: , Rfl:       Physical Exam:   /53   Pulse 65   Temp 98.4  F (36.9  C) (Oral)   Resp 16   Ht 1.82 m (5' 11.65\")   Wt 117.9 kg (259 lb 14.4 oz)   SpO2 97%   BMI 35.59 kg/m    Wt Readings from Last 10 Encounters:   04/22/24 118.3 kg (260 lb 14.4 oz)   04/01/24 119.4 kg (263 lb 3.2 oz)   03/28/24 116.1 kg (256 lb)   03/11/24 119.3 kg (263 lb 1.6 oz)   02/19/24 121.2 kg (267 lb 4.8 oz)   02/13/24 121.4 kg (267 lb 11.2 oz)   01/29/24 122.7 kg (270 lb 6.4 oz)   01/24/24 117.9 kg (260 lb)   01/08/24 119.6 kg (263 lb 9.6 oz)   12/20/23 117.9 kg (260 lb)   General: The patient is a pleasant male in no acute distress.  HEENT: EOMI. Sclerae are anicteric. Mucous membranes moist without lesions or thrush.   Lymph: Neck is supple with no lymphadenopathy in the cervical or supraclavicular areas.   Heart: Regular rate and rhythm.   Lungs: Clear to auscultation bilaterally.   Abdomen: Bowel sounds present, soft, nontender with no palpable hepatosplenomegaly or masses.   Extremities: 1+ pitting lower extremity edema in the left leg into the thigh, trace right sided edema.   Neuro: Cranial nerves II through XII are grossly intact.  Skin: No rashes, petechiae, or bruising noted on exposed skin.  Psych: affect bright, alert and oriented    LABORATORY DATA:  Most Recent 3 CBC's:  Recent Labs   Lab Test 05/13/24  0916 04/22/24  0909 04/01/24  0930   WBC 4.7 4.4 4.6   HGB 9.7* 9.9* 10.7*   * 99 98    166 160   ANEUTAUTO 3.3 3.0 3.0     Most Recent 3 BMP's:  Recent Labs   Lab Test 05/13/24  0916 04/22/24  0909 04/01/24  0930    143 141   POTASSIUM 3.9 4.0 3.9   CHLORIDE 109* 108* 106   CO2 23 25 24   BUN 15.3 14.3 12.4   CR 0.95 0.92 0.92   ANIONGAP 10 10 11   BETHANY 8.6* 8.6* 8.6*   * 92 94   PROTTOTAL 6.1* 6.0* 6.3*   ALBUMIN 4.0 4.0 4.1    Most Recent 3 LFT's:  Recent Labs   Lab Test 05/13/24  0916 04/22/24  0909 04/01/24  0930   AST 21 19 19   ALT " 19 22 20   ALKPHOS 76 74 82   BILITOTAL 0.3 0.4 0.3     US left lower extremity 4/22/24:   FINDINGS:  In the left lower extremity, the common femoral, femoral, popliteal  and posterior tibial veins demonstrate normal compressibility and  blood flow. The peroneal vein is not visualized due to swelling.     The right common femoral vein obtained for comparison is unremarkable.     Subcutaneous edema.     CT Abdomen/pelvis with contrast 4/29/24:                                                                  IMPRESSION:  No evidence left lower extremity deep venous thrombosis. Of note, the  left peroneal vein is not visualized.FINDINGS:   LOWER CHEST: Moderate to large hiatal hernia.     HEPATOBILIARY: Hepatic steatosis. No hepatic masses. No calcified  gallstones.     PANCREAS: Normal.     SPLEEN: Normal.     ADRENAL GLANDS: Normal.     KIDNEYS/BLADDER: Right nephrectomy. Nonobstructing 0.2 cm stone in the  lower pole of the left kidney. No hydronephrosis.     BOWEL: No bowel obstruction. Colonic diverticulosis. No evidence for  colitis or diverticulitis. The appendix is mildly thickened at 0.8 cm,  and there is minimal periappendiceal fat stranding.     LYMPH NODES: No lymphadenopathy.     VASCULATURE: Mild atherosclerotic aortoiliac calcification.     PELVIC ORGANS: Mild prostatic enlargement and heterogeneity,  unchanged.     ADDITIONAL FINDINGS: Small fat-containing paraumbilical hernia.     MUSCULOSKELETAL: Scattered sclerotic metastases throughout the  visualized bones are not significantly changed.                                                                      IMPRESSION:   1.  The appendix is mildly thickened at 0.8 cm, and there is minimal  periappendiceal fat stranding. This finding is of uncertain clinical  significance, although a mild or early appendicitis could have this  appearance.  2.  Scattered sclerotic metastases throughout the visualized bones are  not significantly changed.  3.  Hepatic  "steatosis.  4.  Moderate to large hiatal hernia.    I reviewed the above labs and imaging today.    PSA trend, see oncology history.      ASSESSMENT & PLAN: Mr. Reyes is a 64 year old gentleman with metastatic castration sensitive prostate adenocarcinoma as well as an incidentally diagnosed stage III clear-cell renal cell carcinoma of the right kidney (s/p radical R nephrectomy 3/19/19), who is here prior to cycle 3 cabazitaxel.      1. Metastatic castration-resistant prostate cancer, with involvement of the bones and RPLN:   - Patient met the criteria for CHAARTED \"high-volume\" metastatic hormone-sensitive prostate cancer. He opted for docetaxel in combination with ADT (per CHAARTED, [id number], STAMPEDE). He was subsequently treated with docetaxel x6 doses in the [facility] setting and enzalutamide.  He initiated Pluvicto in May 2023 with an initial slight decline in PSA, but this began to rise just prior to Cycle 3.  Given his XR evidence of progression in L femur and PSA rise, we re-evaluated his progression with PSMA PET scan in September 2023, with note of mixed response.  Reconsented for  [facility] on 8/28/23.  Due for 3 month collection at end of November 2023.  Canceled Pluvicto Dose for November due to symptomatic progression with PSA rise. PSA is labile. Biopsy results from 11/21 are pending for evaluation of NEPC vs small cell given progression without a significant rise in PSA. Tempus peripheral blood mutation analysis is unrevealing for targetable mutations.   -Last lupron 4/22/24. Zometa was held due to mild hypocalcemia on 4/22/24. Increased PO calcium supplement and post-pone 5/13/24. He continues to have mild hypocalemia on 5/13/24 labs. Last lupron was 4/22/24.  -Palliative to manage further opiate refills. Pain is well controlled with fentanyl patch.   -Dexamethasone burst for RT and one on hand if needed for pain flares.      -Cabazitaxel started 11/27/23 given progression on Pluvicto.  " Continue Zofran and Compazine as needed, patient to call clinic if these are not sufficient in controlling nausea.     -Started carbo/cabazitaxel 2/19/24 with good tolerance with expectd fatigue and mild nausea. He is experiencing grade 2 neuropathy worsened with cycle 3. Continue prednisone daily while on cabazitaxel and Neulasta.    -Will continue with a 20% dose reduction of cabazitaxel with cycle 5 today given improvement in neuropathy with reduction. If neuropathy continues, will consider a dose reduction of carboplatin as well. PSA declined to 1.74 with cycle 3. But has been slowly up trending to 2.06 today 5/13/24. Will need to closely monitor and continue to assess benefit.     2. Pressure in chest, resolved:  Has an ongoing sensation of pressure/need to cough in his chest, primarily against his sternum. Vital signs stable, negative pulmonary physical exam. No chest pain today 5/13/24.     3. Bone metastases:    - Noted to have osseous metastatic disease at the time of diagnosis. S/p radiation to C spine and sacrum (Re-irradiation to sacrum).   - worsening radiculopathy down starting in the low back and radiating down the left leg in June. MRI read was without new/acute abnormalities  - Pain continues to fluctuate but overall well controlled/managable   - pain mgmt per palliative care, pain is now well controlled with fentanyl patch.   - Encouraged to continue calcium and vitamin D supplementation; continue Zometa 4mg IV every ~3 months. Due to mild hypocalcemia today will hold zometa 5/13/24. Increase calcium to 1000 mg BID. Continue vitamin d 1,000 mg daily. Reschedule in 1 week, around 5/20/24.     - RT to L femur 10/2023.  RT for bilateral posterior ribs 12/2023 with Dr. Albert.  - Added to trial list for [id number].       4. Stage 3, UISS-high risk ccRCC: s/p R nephrectomy   - Nephrectomy 3/19/19 and noted incidentally.  He is now 4.5 years post-op without evidence for recurrence.   - At this point, there  is a minimal risk of recurrence of his RCC given the time since surgery without the use of adjuvant immunotherapy. There is no suspicious adenopathy or other features on his most recent imaging studies.    - Will continue to monitor with imaging planned for prostate cancer.      5. Sensory neuropathy, Toes/feet Grade 2  - continue monitor for worsening with cabazitaxel.   - Continue acupuncture once weekly. Patient working with insurance to try to increase to twice a week.   - He is on gabapentin managed by palliative care 300 mg in the am, 300 mg in the afternoon, and 600 mg at HS. Discussed with Walter increasing his afternoon dose of gabapentin from 300 mg to 600 mg. He will try this and he will update palliative care as well.      6. Anemia  - likely secondary to chemotherapy. Will recheck iron stores today. If low, will have patient increase his iron to three times a week instead of every 5 days.     7. Left lower extremity swelling   - US 4/22/24 negative for DVT. CT abdomen pelvis 4/29 without significant or new lymphadenopathy. Continue compression stocking. Will refer to lymphedema as therapy and trial of low dose furosemide especially while traveling. Will proceed with an echo today as well.     PLAN:   Proceed with C5 cabazitaxel/carboplatin today with a 20% dose reduction of cabazitaxel.   Continue prednisone daily while on chemotherapy.   Keep dexamethasone on hand for pain flares.    Return 1 week for zometa and in 3 weeks for next cycle (C6) as scheduled.   Echo today.   Scans and Dr. Mcnair in June.     40 minutes spent on the date of the encounter doing chart review, review of test results, interpretation of tests, patient visit, and documentation     Yamilet Espinoza PA-C              Again, thank you for allowing me to participate in the care of your patient.        Sincerely,        Yamilet Espinoza PA-C

## 2024-05-13 NOTE — NURSING NOTE
"Oncology Rooming Note    May 13, 2024 9:26 AM   Walter Reyes is a 64 year old male who presents for:    Chief Complaint   Patient presents with    Blood Draw     Port blood draw by lab RN    Oncology Clinic Visit     [facility] RETURN - PROSTATE CANCER     Initial Vitals: /53   Pulse 65   Temp 98.4  F (36.9  C) (Oral)   Resp 16   Ht 1.82 m (5' 11.65\")   Wt 117.9 kg (259 lb 14.4 oz)   SpO2 97%   BMI 35.59 kg/m   Estimated body mass index is 35.59 kg/m  as calculated from the following:    Height as of this encounter: 1.82 m (5' 11.65\").    Weight as of this encounter: 117.9 kg (259 lb 14.4 oz). Body surface area is 2.44 meters squared.  No Pain (0) Comment: Data Unavailable   No LMP for male patient.  Allergies reviewed: Yes  Medications reviewed: Yes    Medications: Medication refills not needed today.  Pharmacy name entered into EPIC:    [facility] - [place], [place] - [address] [facility] - [place], [place] - [address] [facility] [place], [place] - [address] [place] [address] [phone]  [facility] - [place], [place] - [address] [facility]    Frailty Screening:   Is the patient here for a new oncology consult visit in cancer care? 2. No    Sctot [place] LPN              "

## 2024-05-13 NOTE — PATIENT INSTRUCTIONS
Contact Numbers    [facility] Main Line/TRIAGE: [phone]    Call with chills and/or temperature greater than or equal to 100.5, uncontrolled nausea/vomiting, diarrhea, constipation, dizziness, shortness of breath, chest pain, bleeding, unexplained bruising, or any new/concerning symptoms, questions/concerns.     If you are having any concerning symptoms or wish to speak to a provider before your next infusion visit, please call your care coordinator or triage to notify them so we can adequately serve you.       After Hours: [phone]    If after hours, weekends, or holidays, call main hospital  and ask for Oncology doctor on call.      May 2024      Jorge Monday Tuesday Wednesday Thursday Friday Saturday                  1     2     3     4       5     6     7     8     9     10     11       12     13    LAB CENTRAL   9:00 AM   (15 min.)   [facility]RAW   [facility]    RETURN CCSL   9:15 AM   (45 min.)   Yamilet Espinoza PA-C   [facility]    ONC INFUSION 2.5 HR (150 MIN)  10:30 AM   (150 min.)    ONC INFUSION NURSE   [facility]    ECHO COMPLETE  12:45 PM   (60 min.)   [id number]   [facility] 14     15     16     17     18       19     20     21     22     23     24     25       26     27     28     29     30     31                      June 2024 Sunday Monday Tuesday Wednesday Thursday Friday Saturday                                 1       2     3    LAB CENTRAL   7:30 AM   (15 min.)   [facility]   [facility]    RETURN CCSL   7:45 AM   (45 min.)   Sherry Lee APRN CNP   [facility]    ONC INFUSION 2.5 HR (150 MIN)   9:00 AM   (150 min.)    ONC INFUSION NURSE   [facility] 4     5     6     7     8       9     10     11     12     13     14     15       16     17     18     19      20    NM INJ   7:45 AM   (15 min.)   [id number]   [facility]    CT SOFT TISSUE NECK W   8:05 AM   (20 min.)   [id number]   [facility]    CT CHEST/ABDOMEN/PELVIS W   8:10 AM   (20 min.)   [id number]   [facility] 21     22       23     24    LAB CENTRAL   8:00 AM   (15 min.)    [facility]   [facility]    RETURN CCSL   8:15 AM   (30 min.)   Omar Mcnair MD   [facility]    ONC INFUSION 2.5 HR (150 MIN)   9:00 AM   (150 min.)    ONC INFUSION NURSE   [facility] 25     26     27    RETURN PALLIATIVE  11:00 AM   (40 min.)   Dean Zeng APRN CNP   [facility] 28     29       30                                                   Lab Results:  Recent Results (from the past 12 hour(s))   Comprehensive metabolic panel    Collection Time: 05/13/24  9:16 AM   Result Value Ref Range    Sodium 142 135 - 145 mmol/L    Potassium 3.9 3.4 - 5.3 mmol/L    Carbon Dioxide (CO2) 23 22 - 29 mmol/L    Anion Gap 10 7 - 15 mmol/L    Urea Nitrogen 15.3 8.0 - 23.0 mg/dL    Creatinine 0.95 0.67 - 1.17 mg/dL    GFR Estimate 90 >60 mL/min/1.73m2    Calcium 8.6 (L) 8.8 - 10.2 mg/dL    Chloride 109 (H) 98 - 107 mmol/L    Glucose 111 (H) 70 - 99 mg/dL    Alkaline Phosphatase 76 40 - 150 U/L    AST 21 0 - 45 U/L    ALT 19 0 - 70 U/L    Protein Total 6.1 (L) 6.4 - 8.3 g/dL    Albumin 4.0 3.5 - 5.2 g/dL    Bilirubin Total 0.3 <=1.2 mg/dL   PSA tumor marker    Collection Time: 05/13/24  9:16 AM   Result Value Ref Range    PSA Tumor Marker 2.06 0.00 - 4.50 ng/mL   CBC with platelets and differential    Collection Time: 05/13/24  9:16 AM   Result Value Ref Range    WBC Count 4.7 4.0 - 11.0 10e3/uL    RBC Count 2.96 (L) 4.40 - 5.90 10e6/uL    Hemoglobin 9.7 (L) 13.3 - 17.7 g/dL    Hematocrit 30.3 (L) 40.0 - 53.0 %     (H) 78 - 100 fL    MCH 32.8 26.5 - 33.0 pg    MCHC 32.0  31.5 - 36.5 g/dL    RDW 20.9 (H) 10.0 - 15.0 %    Platelet Count 155 150 - 450 10e3/uL    % Neutrophils 69 %    % Lymphocytes 17 %    % Monocytes 13 %    % Eosinophils 0 %    % Basophils 0 %    % Immature Granulocytes 1 %    NRBCs per 100 WBC 0 <1 /100    Absolute Neutrophils 3.3 1.6 - 8.3 10e3/uL    Absolute Lymphocytes 0.8 0.8 - 5.3 10e3/uL    Absolute Monocytes 0.6 0.0 - 1.3 10e3/uL    Absolute Eosinophils 0.0 0.0 - 0.7 10e3/uL    Absolute Basophils 0.0 0.0 - 0.2 10e3/uL    Absolute Immature Granulocytes 0.0 <=0.4 10e3/uL    Absolute NRBCs 0.0 10e3/uL

## 2024-05-13 NOTE — NURSING NOTE
Chief Complaint   Patient presents with    Blood Draw     Port blood draw by lab RN       Port accessed with blood draw and heparin flush by lab RN. Vitals taken and next appointment arrived.    Emilee Boateng RN

## 2024-05-14 LAB — TESTOST SERPL-MCNC: 2 NG/DL (ref 240–950)

## 2024-05-22 ENCOUNTER — INFUSION THERAPY VISIT (OUTPATIENT)
Dept: INFUSION THERAPY | Facility: CLINIC | Age: 64
End: 2024-05-22
Attending: INTERNAL MEDICINE
Payer: COMMERCIAL

## 2024-05-22 VITALS
RESPIRATION RATE: 16 BRPM | DIASTOLIC BLOOD PRESSURE: 78 MMHG | HEART RATE: 61 BPM | OXYGEN SATURATION: 98 % | SYSTOLIC BLOOD PRESSURE: 120 MMHG | TEMPERATURE: 97.5 F

## 2024-05-22 DIAGNOSIS — C61 MALIGNANT NEOPLASM OF PROSTATE METASTATIC TO BONE (H): Primary | ICD-10-CM

## 2024-05-22 DIAGNOSIS — C79.51 MALIGNANT NEOPLASM OF PROSTATE METASTATIC TO BONE (H): Primary | ICD-10-CM

## 2024-05-22 LAB
ANION GAP SERPL CALCULATED.3IONS-SCNC: 13 MMOL/L (ref 7–15)
BUN SERPL-MCNC: 12.4 MG/DL (ref 8–23)
CALCIUM SERPL-MCNC: 8.5 MG/DL (ref 8.8–10.2)
CHLORIDE SERPL-SCNC: 104 MMOL/L (ref 98–107)
CREAT SERPL-MCNC: 0.89 MG/DL (ref 0.67–1.17)
DEPRECATED HCO3 PLAS-SCNC: 23 MMOL/L (ref 22–29)
EGFRCR SERPLBLD CKD-EPI 2021: >90 ML/MIN/1.73M2
GLUCOSE SERPL-MCNC: 97 MG/DL (ref 70–99)
POTASSIUM SERPL-SCNC: 4.1 MMOL/L (ref 3.4–5.3)
SODIUM SERPL-SCNC: 140 MMOL/L (ref 135–145)

## 2024-05-22 PROCEDURE — 36591 DRAW BLOOD OFF VENOUS DEVICE: CPT

## 2024-05-22 PROCEDURE — 258N000003 HC RX IP 258 OP 636

## 2024-05-22 PROCEDURE — 96365 THER/PROPH/DIAG IV INF INIT: CPT

## 2024-05-22 PROCEDURE — 250N000011 HC RX IP 250 OP 636

## 2024-05-22 PROCEDURE — 80048 BASIC METABOLIC PNL TOTAL CA: CPT

## 2024-05-22 RX ORDER — ZOLEDRONIC ACID 0.04 MG/ML
4 INJECTION, SOLUTION INTRAVENOUS ONCE
Status: CANCELLED | OUTPATIENT
Start: 2024-08-01 | End: 2024-08-01

## 2024-05-22 RX ORDER — HEPARIN SODIUM (PORCINE) LOCK FLUSH IV SOLN 100 UNIT/ML 100 UNIT/ML
5 SOLUTION INTRAVENOUS
Status: CANCELLED | OUTPATIENT
Start: 2024-08-01

## 2024-05-22 RX ORDER — HEPARIN SODIUM,PORCINE 10 UNIT/ML
5 VIAL (ML) INTRAVENOUS
Status: CANCELLED | OUTPATIENT
Start: 2024-08-01

## 2024-05-22 RX ORDER — HEPARIN SODIUM (PORCINE) LOCK FLUSH IV SOLN 100 UNIT/ML 100 UNIT/ML
5 SOLUTION INTRAVENOUS
Status: DISCONTINUED | OUTPATIENT
Start: 2024-05-22 | End: 2024-05-22 | Stop reason: HOSPADM

## 2024-05-22 RX ORDER — ZOLEDRONIC ACID 0.04 MG/ML
4 INJECTION, SOLUTION INTRAVENOUS ONCE
Status: COMPLETED | OUTPATIENT
Start: 2024-05-22 | End: 2024-05-22

## 2024-05-22 RX ADMIN — Medication 5 ML: at 09:22

## 2024-05-22 RX ADMIN — SODIUM CHLORIDE 250 ML: 9 INJECTION, SOLUTION INTRAVENOUS at 08:59

## 2024-05-22 RX ADMIN — ZOLEDRONIC ACID 4 MG: 0.04 INJECTION, SOLUTION INTRAVENOUS at 08:59

## 2024-05-22 NOTE — PROGRESS NOTES
Infusion Nursing Note:  Walter Reyes presents today for Zometa + labs.    Patient seen by provider today: No   present during visit today: Not Applicable.    Note: Patient confirmed that he is taking Ca/Vit D. No upcoming dental procedures.  BMP drawn today for KADEN Pereira.    Intravenous Access:  Labs drawn without difficulty.  Implanted Port.    Treatment Conditions:  Lab Results   Component Value Date     05/13/2024    POTASSIUM 3.9 05/13/2024    MAG 1.9 08/25/2023    CR 0.95 05/13/2024    BETHANY 8.6 (L) 05/13/2024    BILITOTAL 0.3 05/13/2024    ALBUMIN 4.0 05/13/2024    ALT 19 05/13/2024    AST 21 05/13/2024       Results reviewed, labs MET treatment parameters, ok to proceed with treatment.      Post Infusion Assessment:  Patient tolerated infusion without incident.  Blood return noted pre and post infusion.  Site patent and intact, free from redness, edema or discomfort.  No evidence of extravasations.  Access discontinued per protocol.       Discharge Plan:   AVS to patient via MYCHART.  Patient will return to [facility] 6/3/24 for next appointment.   Patient discharged in stable condition accompanied by: self.  Departure Mode: Ambulatory.      Emilee Sim RN

## 2024-05-23 ENCOUNTER — PATIENT OUTREACH (OUTPATIENT)
Dept: ONCOLOGY | Facility: CLINIC | Age: 64
End: 2024-05-23
Payer: COMMERCIAL

## 2024-05-23 DIAGNOSIS — C79.51 MALIGNANT NEOPLASM OF PROSTATE METASTATIC TO BONE (H): Primary | ICD-10-CM

## 2024-05-23 DIAGNOSIS — C61 MALIGNANT NEOPLASM OF PROSTATE METASTATIC TO BONE (H): Primary | ICD-10-CM

## 2024-05-23 NOTE — PROGRESS NOTES
[facility]: Cancer Care                                                                                        RN called to let patient know that whoever wrote the orders for Acupuncture needs to sign the PA for additional treatment. Patient verbalized understanding and will reach out to the provider.       Adilene DAIGLE, RN, OCN  Care Coordinator  [facility]

## 2024-06-03 ENCOUNTER — APPOINTMENT (OUTPATIENT)
Dept: LAB | Facility: CLINIC | Age: 64
End: 2024-06-03
Payer: COMMERCIAL

## 2024-06-03 ENCOUNTER — ONCOLOGY VISIT (OUTPATIENT)
Dept: ONCOLOGY | Facility: CLINIC | Age: 64
End: 2024-06-03
Payer: COMMERCIAL

## 2024-06-03 ENCOUNTER — INFUSION THERAPY VISIT (OUTPATIENT)
Dept: ONCOLOGY | Facility: CLINIC | Age: 64
End: 2024-06-03
Payer: COMMERCIAL

## 2024-06-03 VITALS
OXYGEN SATURATION: 96 % | HEART RATE: 73 BPM | WEIGHT: 255.8 LBS | DIASTOLIC BLOOD PRESSURE: 74 MMHG | TEMPERATURE: 98.7 F | RESPIRATION RATE: 16 BRPM | SYSTOLIC BLOOD PRESSURE: 123 MMHG | BODY MASS INDEX: 35.03 KG/M2

## 2024-06-03 DIAGNOSIS — M79.89 LEFT LEG SWELLING: ICD-10-CM

## 2024-06-03 DIAGNOSIS — C61 MALIGNANT NEOPLASM OF PROSTATE METASTATIC TO BONE (H): Primary | ICD-10-CM

## 2024-06-03 DIAGNOSIS — Z76.89 PREVENTION OF CHEMOTHERAPY-INDUCED NEUTROPENIA: ICD-10-CM

## 2024-06-03 DIAGNOSIS — C79.51 MALIGNANT NEOPLASM OF PROSTATE METASTATIC TO BONE (H): Primary | ICD-10-CM

## 2024-06-03 DIAGNOSIS — D64.9 ANEMIA, UNSPECIFIED TYPE: ICD-10-CM

## 2024-06-03 LAB
ALBUMIN SERPL BCG-MCNC: 3.9 G/DL (ref 3.5–5.2)
ALP SERPL-CCNC: 73 U/L (ref 40–150)
ALT SERPL W P-5'-P-CCNC: 18 U/L (ref 0–70)
ANION GAP SERPL CALCULATED.3IONS-SCNC: 10 MMOL/L (ref 7–15)
AST SERPL W P-5'-P-CCNC: 22 U/L (ref 0–45)
BASOPHILS # BLD AUTO: 0 10E3/UL (ref 0–0.2)
BASOPHILS NFR BLD AUTO: 0 %
BILIRUB SERPL-MCNC: 0.3 MG/DL
BUN SERPL-MCNC: 14.6 MG/DL (ref 8–23)
CALCIUM SERPL-MCNC: 8.5 MG/DL (ref 8.8–10.2)
CHLORIDE SERPL-SCNC: 106 MMOL/L (ref 98–107)
CREAT SERPL-MCNC: 1 MG/DL (ref 0.67–1.17)
DEPRECATED HCO3 PLAS-SCNC: 24 MMOL/L (ref 22–29)
EGFRCR SERPLBLD CKD-EPI 2021: 85 ML/MIN/1.73M2
EOSINOPHIL # BLD AUTO: 0 10E3/UL (ref 0–0.7)
EOSINOPHIL NFR BLD AUTO: 0 %
ERYTHROCYTE [DISTWIDTH] IN BLOOD BY AUTOMATED COUNT: 19 % (ref 10–15)
FOLATE SERPL-MCNC: 25.6 NG/ML (ref 4.6–34.8)
GLUCOSE SERPL-MCNC: 130 MG/DL (ref 70–99)
HCT VFR BLD AUTO: 31.1 % (ref 40–53)
HGB BLD-MCNC: 10 G/DL (ref 13.3–17.7)
IMM GRANULOCYTES # BLD: 0 10E3/UL
IMM GRANULOCYTES NFR BLD: 1 %
LYMPHOCYTES # BLD AUTO: 0.7 10E3/UL (ref 0.8–5.3)
LYMPHOCYTES NFR BLD AUTO: 16 %
MCH RBC QN AUTO: 33.8 PG (ref 26.5–33)
MCHC RBC AUTO-ENTMCNC: 32.2 G/DL (ref 31.5–36.5)
MCV RBC AUTO: 105 FL (ref 78–100)
MONOCYTES # BLD AUTO: 0.6 10E3/UL (ref 0–1.3)
MONOCYTES NFR BLD AUTO: 15 %
NEUTROPHILS # BLD AUTO: 2.8 10E3/UL (ref 1.6–8.3)
NEUTROPHILS NFR BLD AUTO: 68 %
NRBC # BLD AUTO: 0 10E3/UL
NRBC BLD AUTO-RTO: 0 /100
PLATELET # BLD AUTO: 139 10E3/UL (ref 150–450)
POTASSIUM SERPL-SCNC: 3.9 MMOL/L (ref 3.4–5.3)
PROT SERPL-MCNC: 6.2 G/DL (ref 6.4–8.3)
PSA SERPL DL<=0.01 NG/ML-MCNC: 2.68 NG/ML (ref 0–4.5)
RBC # BLD AUTO: 2.96 10E6/UL (ref 4.4–5.9)
RETICS # AUTO: 0.08 10E6/UL (ref 0.03–0.1)
RETICS/RBC NFR AUTO: 2.8 % (ref 0.5–2)
SODIUM SERPL-SCNC: 140 MMOL/L (ref 135–145)
VIT B12 SERPL-MCNC: >4000 PG/ML (ref 232–1245)
WBC # BLD AUTO: 4.1 10E3/UL (ref 4–11)

## 2024-06-03 PROCEDURE — 85045 AUTOMATED RETICULOCYTE COUNT: CPT

## 2024-06-03 PROCEDURE — 96375 TX/PRO/DX INJ NEW DRUG ADDON: CPT

## 2024-06-03 PROCEDURE — G2211 COMPLEX E/M VISIT ADD ON: HCPCS

## 2024-06-03 PROCEDURE — 99215 OFFICE O/P EST HI 40 MIN: CPT

## 2024-06-03 PROCEDURE — 84403 ASSAY OF TOTAL TESTOSTERONE: CPT

## 2024-06-03 PROCEDURE — 250N000011 HC RX IP 250 OP 636: Mod: JZ

## 2024-06-03 PROCEDURE — 82040 ASSAY OF SERUM ALBUMIN: CPT

## 2024-06-03 PROCEDURE — 82746 ASSAY OF FOLIC ACID SERUM: CPT

## 2024-06-03 PROCEDURE — 250N000011 HC RX IP 250 OP 636

## 2024-06-03 PROCEDURE — 36415 COLL VENOUS BLD VENIPUNCTURE: CPT

## 2024-06-03 PROCEDURE — 84153 ASSAY OF PSA TOTAL: CPT

## 2024-06-03 PROCEDURE — 96367 TX/PROPH/DG ADDL SEQ IV INF: CPT

## 2024-06-03 PROCEDURE — 82607 VITAMIN B-12: CPT

## 2024-06-03 PROCEDURE — 36591 DRAW BLOOD OFF VENOUS DEVICE: CPT

## 2024-06-03 PROCEDURE — 96377 APPLICATON ON-BODY INJECTOR: CPT | Mod: 59

## 2024-06-03 PROCEDURE — 99213 OFFICE O/P EST LOW 20 MIN: CPT | Mod: 25

## 2024-06-03 PROCEDURE — 36593 DECLOT VASCULAR DEVICE: CPT

## 2024-06-03 PROCEDURE — 96372 THER/PROPH/DIAG INJ SC/IM: CPT

## 2024-06-03 PROCEDURE — 96413 CHEMO IV INFUSION 1 HR: CPT

## 2024-06-03 PROCEDURE — 258N000003 HC RX IP 258 OP 636

## 2024-06-03 PROCEDURE — 85025 COMPLETE CBC W/AUTO DIFF WBC: CPT

## 2024-06-03 PROCEDURE — 96417 CHEMO IV INFUS EACH ADDL SEQ: CPT

## 2024-06-03 RX ORDER — HEPARIN SODIUM (PORCINE) LOCK FLUSH IV SOLN 100 UNIT/ML 100 UNIT/ML
5 SOLUTION INTRAVENOUS
Status: COMPLETED | OUTPATIENT
Start: 2024-06-03 | End: 2024-06-03

## 2024-06-03 RX ORDER — LORAZEPAM 2 MG/ML
0.5 INJECTION INTRAMUSCULAR EVERY 4 HOURS PRN
Status: CANCELLED | OUTPATIENT
Start: 2024-06-03

## 2024-06-03 RX ORDER — HEPARIN SODIUM (PORCINE) LOCK FLUSH IV SOLN 100 UNIT/ML 100 UNIT/ML
5 SOLUTION INTRAVENOUS
Status: CANCELLED | OUTPATIENT
Start: 2024-06-03

## 2024-06-03 RX ORDER — METHYLPREDNISOLONE SODIUM SUCCINATE 125 MG/2ML
125 INJECTION, POWDER, LYOPHILIZED, FOR SOLUTION INTRAMUSCULAR; INTRAVENOUS
Status: CANCELLED
Start: 2024-06-03

## 2024-06-03 RX ORDER — MEPERIDINE HYDROCHLORIDE 25 MG/ML
25 INJECTION INTRAMUSCULAR; INTRAVENOUS; SUBCUTANEOUS EVERY 30 MIN PRN
Status: CANCELLED | OUTPATIENT
Start: 2024-06-03

## 2024-06-03 RX ORDER — PALONOSETRON 0.05 MG/ML
0.25 INJECTION, SOLUTION INTRAVENOUS ONCE
Status: COMPLETED | OUTPATIENT
Start: 2024-06-03 | End: 2024-06-03

## 2024-06-03 RX ORDER — ALBUTEROL SULFATE 0.83 MG/ML
2.5 SOLUTION RESPIRATORY (INHALATION)
Status: CANCELLED | OUTPATIENT
Start: 2024-06-03

## 2024-06-03 RX ORDER — EPINEPHRINE 1 MG/ML
0.3 INJECTION, SOLUTION INTRAMUSCULAR; SUBCUTANEOUS EVERY 5 MIN PRN
Status: CANCELLED | OUTPATIENT
Start: 2024-06-03

## 2024-06-03 RX ORDER — ALBUTEROL SULFATE 90 UG/1
1-2 AEROSOL, METERED RESPIRATORY (INHALATION)
Status: CANCELLED
Start: 2024-06-03

## 2024-06-03 RX ORDER — HEPARIN SODIUM (PORCINE) LOCK FLUSH IV SOLN 100 UNIT/ML 100 UNIT/ML
5 SOLUTION INTRAVENOUS
Status: DISCONTINUED | OUTPATIENT
Start: 2024-06-03 | End: 2024-06-03 | Stop reason: HOSPADM

## 2024-06-03 RX ORDER — DIPHENHYDRAMINE HYDROCHLORIDE 50 MG/ML
50 INJECTION INTRAMUSCULAR; INTRAVENOUS
Status: CANCELLED
Start: 2024-06-03

## 2024-06-03 RX ORDER — PREDNISONE 5 MG/1
5 TABLET ORAL 2 TIMES DAILY WITH MEALS
Qty: 42 TABLET | Refills: 0 | Status: SHIPPED | OUTPATIENT
Start: 2024-06-03 | End: 2024-06-24

## 2024-06-03 RX ORDER — HEPARIN SODIUM,PORCINE 10 UNIT/ML
5-20 VIAL (ML) INTRAVENOUS DAILY PRN
Status: CANCELLED | OUTPATIENT
Start: 2024-06-03

## 2024-06-03 RX ORDER — PALONOSETRON 0.05 MG/ML
0.25 INJECTION, SOLUTION INTRAVENOUS ONCE
Status: CANCELLED | OUTPATIENT
Start: 2024-06-03

## 2024-06-03 RX ADMIN — FAMOTIDINE 20 MG: 10 INJECTION INTRAVENOUS at 09:21

## 2024-06-03 RX ADMIN — PEGFILGRASTIM 6 MG: KIT SUBCUTANEOUS at 12:31

## 2024-06-03 RX ADMIN — Medication 5 ML: at 13:03

## 2024-06-03 RX ADMIN — DEXAMETHASONE SODIUM PHOSPHATE: 10 INJECTION, SOLUTION INTRAMUSCULAR; INTRAVENOUS at 09:46

## 2024-06-03 RX ADMIN — Medication 5 ML: at 07:46

## 2024-06-03 RX ADMIN — DIPHENHYDRAMINE HYDROCHLORIDE 25 MG: 50 INJECTION, SOLUTION INTRAMUSCULAR; INTRAVENOUS at 09:26

## 2024-06-03 RX ADMIN — SODIUM CHLORIDE 39 MG: 9 INJECTION, SOLUTION INTRAVENOUS at 10:22

## 2024-06-03 RX ADMIN — SODIUM CHLORIDE 250 ML: 9 INJECTION, SOLUTION INTRAVENOUS at 09:20

## 2024-06-03 RX ADMIN — PALONOSETRON HYDROCHLORIDE 0.25 MG: 0.25 INJECTION INTRAVENOUS at 09:21

## 2024-06-03 RX ADMIN — CARBOPLATIN 500 MG: 10 INJECTION INTRAVENOUS at 12:27

## 2024-06-03 RX ADMIN — ALTEPLASE 2 MG: 2.2 INJECTION, POWDER, LYOPHILIZED, FOR SOLUTION INTRAVENOUS at 11:46

## 2024-06-03 ASSESSMENT — PAIN SCALES - GENERAL: PAINLEVEL: NO PAIN (0)

## 2024-06-03 NOTE — NURSING NOTE
"Chief Complaint   Patient presents with    Oncology Clinic Visit     Malignant neoplasm of prostate metastatic to bone    Port Draw     Labs drawn from port by rn.  VS taken.     Port accessed with 20 gauge 3/4\" gripper needle and labs drawn by rn.  Port flushed with NS and heparin.  Pt tolerated well.  VS taken.  Pt checked in for next appt.    Nadege Mendoza RN      "

## 2024-06-03 NOTE — PROGRESS NOTES
Infusion Nursing Note:  Walter Reyes presents today for Cycle 6, Day 1- Cabazitaxel and Carboplatin (#6) Infusion.    Patient seen by provider today: Yes: Sherry Lee CNP   present during visit today: Not Applicable.    Note: Patient reports feeling well on arrival to infusion suite today. Denies the following signs of infection: no fever, chills, rash, chest pain, worsening shortness of breath, or diarrhea. No new questions or concerns since [facility]MINA appointment. Wishes to proceed with treatment today.     Per Secure Chat: Sherry Lee NP/ Jhoana Cao RN   -draw folate, patient does not need to stay for results  -Tell patient to continue with increased PO calcium dose at home    Discussed with patient who verbalized understanding.     Neulasta Onpro On-Body injector applied to right lower abdomen at 1240.  Writer discussed Neulasta injection would start tomorrow Tuesday 6/4 at 3:40 PM, approximately 27 hours after application applied today.    Written and Verbal instruction reviewed with patient.  Pt instructed when the dose delivery starts, it will take about 45 minutes to complete.  Pt aware Neulasta Onpro On-Body should have green flashing light and to call triage or on-call MD if injector flashes red or appears to be leaking.   Pt aware to keep Onpro On-Body Neulasta 4 inches away from electrical equipment and to avoid showering 4 hours prior to injection.   Neulasta Onpro Lot number: [id number].      Intravenous Access:  Implanted Port.  Port with positive blood return prior to Jevtana infusion. No blood return post Jevtana. Patient opted to wait 30 minutes with TPA was instilled. After 30 minutes still no blood return from the Port. TPA re-instilled and paged vascular access to place PIV for carboplatin infusion.   Blood return from port after 70 minutes of dwell time.     Treatment Conditions:  Lab Results   Component Value Date    HGB 10.0 (L) 06/03/2024    WBC 4.1 06/03/2024    ANEU  5.0 07/07/2021    ANEUTAUTO 2.8 06/03/2024     (L) 06/03/2024        Lab Results   Component Value Date     06/03/2024    POTASSIUM 3.9 06/03/2024    MAG 1.9 08/25/2023    CR 1.00 06/03/2024    BETHANY 8.5 (L) 06/03/2024    BILITOTAL 0.3 06/03/2024    ALBUMIN 3.9 06/03/2024    ALT 18 06/03/2024    AST 22 06/03/2024       Results reviewed, labs MET treatment parameters, ok to proceed with treatment.      Post Infusion Assessment:  Patient tolerated infusion without incident.  Blood return noted pre and post infusion.  Site patent and intact, free from redness, edema or discomfort.  No evidence of extravasations.  Access discontinued per protocol.       Discharge Plan:   Prescription refills given for Prednisone.  Discharge instructions reviewed with: Patient and Family.  Patient and/or family verbalized understanding of discharge instructions and all questions answered.  AVS to patient via QutureHART.  Patient will return 6/24/24 for next appointment.   Patient discharged in stable condition accompanied by: wife.  Departure Mode: Ambulatory.      Jhoana Cao RN

## 2024-06-03 NOTE — PATIENT INSTRUCTIONS
You received an antinausea medication called Aloxi prior to your treatment today which lasts in your body for 72 hours. Aloxi is in the same family as one of your take home medications, Ondanestron (Zofran). Because they are in the same family if you feel nauseated over the next 3 days take Compazine (prochlorperazine). Taking Zofran will not help with nausea in the first three days after treatment because the Aloxi in your system is already working on that pathway.     Your Neulasta injection will start on Tuesday at 3:40 PM, approximately 27 hours after application applied today.    When the dose delivery starts, it will take about 45 minutes to complete.    You may remove the On-Body Neulasta at 4:30 PM on Tuesday.  Neulasta Onpro On-Body should have green flashing light, call triage or on-call MD if injector flashes red or appears to be leaking.  Keep Onpro On-Body Neulasta 4 inches away from electrical equipment and to avoid showering 4 hours prior to injection.      [facility] Triage and after hours / weekends / holidays:  [phone]    Please call the triage or after hours line if you experience a temperature greater than or equal to 100.4, shaking chills, have uncontrolled nausea, vomiting and/or diarrhea, dizziness, shortness of breath, chest pain, bleeding, unexplained bruising, or if you have any other new/concerning symptoms, questions or concerns.      If you are having any concerning symptoms or wish to speak to a provider before your next infusion visit, please call triage to notify them so we can adequately serve you.     If you need a refill on a narcotic prescription or other medication, please call before your infusion appointment.

## 2024-06-03 NOTE — NURSING NOTE
"Oncology Rooming Note    Pina 3, 2024 7:58 AM   Walter Reyes is a 64 year old male who presents for:    Chief Complaint   Patient presents with    Oncology Clinic Visit     Malignant neoplasm of prostate metastatic to bone    Port Draw     Labs drawn from port by rn.  VS taken.     Initial Vitals: /74 (BP Location: Right arm, Patient Position: Sitting, Cuff Size: Adult Large)   Pulse 73   Temp 98.7  F (37.1  C) (Oral)   Resp 16   Wt 116 kg (255 lb 12.8 oz)   SpO2 96%   BMI 35.03 kg/m   Estimated body mass index is 35.03 kg/m  as calculated from the following:    Height as of 5/13/24: 1.82 m (5' 11.65\").    Weight as of this encounter: 116 kg (255 lb 12.8 oz). Body surface area is 2.42 meters squared.  No Pain (0) Comment: Data Unavailable   No LMP for male patient.  Allergies reviewed: Yes  Medications reviewed: Yes    Medications: MEDICATION REFILLS NEEDED TODAY. Provider was notified.  Pharmacy name entered into EPIC:    [facility] [place], [place] - [address] [facility] - [place], [place] - [address] [facility] [place], [place] - [phone]  [facility] [place], [place] - [address] [facility] [facility]    Frailty Screening:   Is the patient here for a new oncology consult visit in cancer care? 2. No      Clinical concerns: None       Deidre Louise CMA            "

## 2024-06-03 NOTE — PROGRESS NOTES
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      INTERVAL HISTORY:   Walter presents today feeling tired after returning home from vacation last night. He denies any new concerns today.   -Edema in left leg is unchanged, has a lymphedema massage next week.   -First week is rough with fatigue, nauseate and weakness. Using a walker to ambulate at home. Still trying to go for short walks outside to stay active. Taking Compazine and Zofran PRN, still eating and no vomiting.   -Just got back from an [employer] , has a mild cold with a non productive cough and nasal/ear congestion. No fevers.   -mild diarrhea after chemo, goes back to constipation after a few days. Taking 1/2 tablet of imodium or stool softeners as needed.   -neuropathy is unchanged, continues with acupuncture that has been very helpful.   -still feels the pressure in his chest along the sternum, but it has improved. Has dyspnea with exertion, denies any at rest. No chest pain.   -pain is well controlled on fentanyl patch and oxycodone at bedtime. Has pain in his left femur and occasionally in his forehead, neither are new or changed in severity.        Review of Systems:  Patient denies any of the following except if noted above: fevers, chills, difficulty with energy, vision or hearing changes, chest pain, dyspnea, abdominal pain, nausea, vomiting, diarrhea, constipation, urinary concerns, headaches, numbness, tingling, issues with sleep or mood. Also denies lumps, bumps, rashes or skin lesions, bleeding or bruising issues.      ONCOLOGY HISTORY: Mr. 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 His oncologic history is detailed below.     1. De  "deja metastatic prostate adenocarcinoma, stage IV (M1b at diagnosis), high-volume, castration-sensitive:  - 12/13/2018: PSA found to be elevated to 9.1 ng/mL on a routine follow-up with primary care provider Dr. Naylor at [facility]. Prior PSA were 1.4 on 8/16/17, 2.4 on 6/28/16, 2.9 on 6/28/16, and as low as 0.4 on 3/26/2003.   - 1/08/2019: Consultation with Sarah Wilson CNP in Urology clinic. Repeat PSA 9.6.  - 1/16/2019: MRI prostate with contrast - \"This examination is characterized as PIRADS 5- very high probability. Clinically significant cancer is highly likely to be present. There is a large, invasive mass arising from the right peripheral zone and extending into the neurovascular bundle, seminal vesicle, and along the anterolateral right mesorectal fascia. Metastatic right external iliac lymph node. Metastatic lesion in the posterior/superior right acetabulum.\"  - 1/25/2019: CT abdomen and pelvis with IV contrast - \"1. Heterogeneous enhancing mass posteriorly in the upper pole of the right kidney measures 4.5 x 5.8 x 5.7 cm (AP by transverse by craniocaudal). It has a small nodular component extending posterior medially which abuts the right psoas muscle. This nodular extension measures 2.1 x 2.1 cm. Minimal stranding about the mass. No definite thrombus within the right renal vein. This renal mass is compatible with a renal cell carcinoma. A paraaortic lymph node situated immediately posterior to the left renal vein measures 1.1 cm in short axis, suspicious for metastasis. 2. A 2 cm right external iliac lymph node is also suspicious for metastases. 3. Multiple sclerotic osseous lesions suspicious for metastases. These include 0.8 and 0.6 cm sclerotic lesions laterally in the right iliac wing (images 53 and 62 respectively). Ill-defined groundglass density laterally in the right acetabulum measuring 1.7 x 1.2 cm corresponds with the lesion identified on MRI. A 0.4 cm groundglass density in the left " "acetabulum. Sclerotic lesion in the left femoral neck measures 0.9 x 1.6 cm (image 81). Sclerotic metastases would be more compatible with prostate metastases. 4. A 3 subcentimeter hepatic lesions are indeterminate. Metastases would be a consideration. These can be further characterized with liver MRI.\"  - 1/25/2019: NM bone scan - \"There is focal bony uptake in the left femoral neck, right acetabulum, right of midline at the S1 or L5 level of the spine, within multiple bilateral ribs, in the C7 or T1 level of the spine, and anteriorly within the skull. Findings are suspicious for metastases.\"  - 1/31/19: CT chest with contrast - \" No suspicious nodules in the chest.  Stable appearance of a 5.8 cm right renal mass concerning for renal carcinoma until proven otherwise.  Stable indeterminant subcentimeter hypodensities in the liver.  Multifocal osteoblastic metastasis including 2.5 cm lesion in the right fifth rib posteriorly and a 1.6 cm lesion in the right third rib posteriorly. No lytic lesions identified.\"  - 2/6/19: CT-guided right sclerotic fifth rib lesion biopsy - \"Metastatic carcinoma, consistent with prostate primary.  Immunohistochemical stains performed show the metastatic carcinoma stains positive for NKX3.1 (prostate marker), negative for STACIE 3 and PAX8, which supports the above diagnosis.\"  - 2/15/19: Started Casodex 50mg every day and consented for the biobanking protocol. 3/18/19 - stopped Casodex.  - 2/19/19: Case discussed in tumor board - recommendation for nephectomy for suspected malignant right renal mass.   - 2/26/19: Started Lupron 22.5mg every 3 months.   - 5/8/19: Started Docetaxel 75mg/m2 IV every 3 weeks. 06/18/19 - cycle 3, 7/10/19 - cycle 4, 07/31/19 - cycle 5, 08/21/19 - cycle 6.   - 10/2/19: Restaging CT CAP and NM Bone Scan showed improved osseous disease, no evidence of recurrent or new metastatic disease.  - 1/5/20: Restaging CT CAP and NM Bone Scan showed stable osseous disease, " "no evidence of recurrent or new metastatic disease.  - 4/6/20: NM bone scan with slightly improved uptake in known skeletal mets. CT C/A/P with contrast with stable osseous mets, no yusuf enlargement, no new visceral mets etc.  - 04/08/20: Zometa every 3 months.  - 10/5/20: CT C/A/P with contrast and NM bone scan - SD.   - 1/4/21: CT C/A/P with contrast and NM bone scan - SD but slight increase in right 5th rib tracer uptake and in posterior L5 sclerosis. 1/6/21  PSA = 0.29  - 03/29/21: CT C/A/P with contrast - single new right sacral 8mm bone lesion (suspicious), other bone mets stable. No visceral/yusuf disease. Nephrectomy site without recurrence. NM bone scan - SD but \"increased uptake associated with the prior lesions of the lower cervical spine, posterior right fourth rib and right L5 posterior arch elements. Otherwise unchanged uptake associated with the proximal left femur and left anterior fourth rib. Similar uptake of the left halux, possibly secondary to degenerative osteoarthritis or gout.\"    3/31/21 PSA = 0.44  7/7/21  PSA = 0.47  11/2021 PSA = 1.05  -- Start XTANDI  12/16/21 PSA =0.22  2/11/22 PSA= 0.50  3/22/22 PSA=0.72  5/5/2022 PSA=1.79  7/11/2022 PSA=2.16 --Start cycle 1 docetaxel   8/22/22 PSA = 1.36  9/12/22 PSA = 1.09  10/24/22 Cycle #6 of Docetaxel. End of treatment  1/9/23  PSA =0.66  3/20/23  PSA=1.54  4/21/23 PSA = 1.81  T=15  5/22/23 C1 Pluvicto   06/07/23          PSA = 1.42  7/18/23 PSA=1.75, C2 Pluvicto   8/28/23 Transfer of care initial visit for Zorko.  PSA 2.06.  C3 Pluvicto scheduled for 8/30.  Proceed with dose as planned.  MR L femur, ortho referral, PSMA PET ordered for prior to follow-up.    10/2/23 Palliative RT to L femur metastasis complete.  PSMA PET with mixed response.  PSA 2.21.  RT to L femur pending.  Continue with Dose #4 of Pluvicto despite mixed response to therapy.  Dex course for possible pain flare with RT.    11/8/23  Follow-up prior to Dose #5 Pluvicto.  More " "pain in chest/ribs/back. PSA 4.38.  Testosterone=3.  Rad onc referral for ribs.  Biopsy progressive lesion for progression on Pluvicto.  Cabazitaxel scheduled for follow-up appointment.  [facility]us blood testing unrevealing for targetable mutation.    11/27/23 Bx completed from R iliac crest but unrevealing for either PCa or RCC.  Start cabazitaxel C1D1.  PSA 2.81.    12/19/23 PSA = 3.66  1/8/24 PSA = 3.55  1/29/24 PSA = 3.07  2/19/24 PSA= 2.21-Start carboplatin/cabazitaxel.    3/11/24 PSA= C2 Carbo/cabazi.  PSA 1.99.    4/01/24 PSA= 1.74. C3 Carbo/cabazi.   4/22/24 PSA= 1.85. C4 Carbo/cabazi.   5/13/24 PSA = 2.06. C5 Carbo/cabazi.   6/3/24 PSA = 2.68, cycle 6 Carboplatin/cabazitaxel    Radiation history:   -C7         3,000 cGy       06 X      8/05/2021        8/18/2021        13       10  -2 Sacrum          2,500 cGy       18 X      4/12/2022        4/18/2022         6           -Sacrum retreat               700 cGy        18 X      2/28/2023        2/28/2023  -Left femur to 2000 cGy in 5 fractions, completed 10/10/2023   -bilateral posterior chest wall at an area of osseous involvement of the ribs and adjacent T4 and 5 spine, to be delivered to 2000 cGy in 5 fractions.  completed 12/13-12/19/23.      2. Stage III ([id number]), grade 3 of 4, clear-cell RCC of right kidney:  - Incidentally diagnosed as above.   - Underwent curative-intent robotic right radical nephrectomy with Dr. Wesley Cleveland on 3/19/19. No tumor spillage per op report.   - Path showed: \"Histologic Type: Clear cell renal cell carcinoma; Sarcomatoid Features: Not identified; Histologic Grade: Nucleolar grade 3 (WHO/ISUP). Extent Tumor Size: 4.3 cm. Microscopic Tumor Extension: Tumor extension into renal sinus (in vascular structures). Margins: Negative. Tumor Necrosis: Present; focal. Lymph-Vascular Invasion: Not identified.  Pathologic Staging (pTNM) Primary Tumor (pT): pT3a: Tumor extends into renal vein branches/renal sinus. Regional Lymph Nodes " "(pN): pNX. Number of Lymph Nodes Examined: 0 Distant Metastasis (pM): pM N/A.\"  - Restaging scans as above.  No current concerns for recurrence.      PAST MEDICAL HISTORY:  Past Medical History:   Diagnosis Date    Cancer of kidney, right (H)     Complication of anesthesia     slow wakeup     Malignant neoplasm of prostate metastatic to bone (H) 2/14/2019    Thyroid disease      CURRENT MEDICATIONS:   Current Outpatient Medications:     atorvastatin (LIPITOR) 20 MG tablet, Take 60 mg by mouth daily , Disp: , Rfl:     BERBERINE CHLORIDE PO, , Disp: , Rfl:     calcium citrate-vitamin D (CITRACAL) 315-250 MG-UNIT TABS per tablet, Take 500 mg by mouth 2 times daily With 800 Vd per pt, Disp: , Rfl:     cycloSPORINE (RESTASIS) 0.05 % ophthalmic emulsion, Place 1 drop into both eyes 2 times daily , Disp: , Rfl:     dexAMETHasone (DECADRON) 4 MG tablet, Take 2 tablets (8 mg) by mouth daily Take for 3 days, starting the day after chemo. Take with food., Disp: 6 tablet, Rfl: 2    dexAMETHasone (DECADRON) 4 MG tablet, Take 1 tablet (4 mg) by mouth daily, Disp: 7 tablet, Rfl: 2    fentaNYL (DURAGESIC) 25 mcg/hr 72 hr patch, Place 1 patch onto the skin every 72 hours remove old patch., Disp: 10 patch, Rfl: 0    furosemide (LASIX) 20 MG tablet, Take 1 tablet (20 mg) by mouth daily, Disp: 15 tablet, Rfl: 1    gabapentin (NEURONTIN) 300 MG capsule, Take 1 capsule (300 mg) by mouth every morning AND 2 capsules (600 mg) daily at 2 pm AND 2 capsules (600 mg) at bedtime., Disp: 150 capsule, Rfl: 3    levothyroxine (SYNTHROID/LEVOTHROID) 112 MCG tablet, Take 112 mcg by mouth daily, Disp: , Rfl:     lidocaine, viscous, (XYLOCAINE) 2 % solution, Swish and swallow 15 mLs in mouth every 3 hours as needed for pain (before meals and at bedtime) ; Max 8 doses/24 hour period., Disp: 100 mL, Rfl: 1    loratadine (CLARITIN) 10 MG tablet, Take 10 mg by mouth daily, Disp: , Rfl:     Multiple Vitamins-Minerals (MULTIVITAMIN ADULT EXTRA C PO), Take 1 " tablet by mouth every 24 hours, Disp: , Rfl:     naloxone (NARCAN) 4 MG/0.1ML nasal spray, Spray 1 spray (4 mg) into one nostril alternating nostrils as needed for opioid reversal every 2-3 minutes until assistance arrives, Disp: 0.2 mL, Rfl: 1    Nutritional Supplements (SALMON OIL) CAPS, Take 2 capsules by mouth daily , Disp: , Rfl:     omeprazole (PRILOSEC) 20 MG DR capsule, Take 20 mg by mouth daily, Disp: , Rfl:     ondansetron (ZOFRAN) 8 MG tablet, Take 1 tablet (8 mg) by mouth every 8 hours as needed for nausea (vomiting), Disp: 30 tablet, Rfl: 2    ondansetron (ZOFRAN) 8 MG tablet, Take 1 tablet (8 mg) by mouth every 8 hours as needed for nausea, Disp: 30 tablet, Rfl: 4    oxyCODONE (ROXICODONE) 5 MG tablet, Take 1-2 tablets (5-10 mg) by mouth every 3 hours as needed for severe pain, Disp: 90 tablet, Rfl: 0    predniSONE (DELTASONE) 5 MG tablet, Take 1 tablet (5 mg) by mouth 2 times daily (with meals) for 21 days, Disp: 42 tablet, Rfl: 0    prochlorperazine (COMPAZINE) 10 MG tablet, Take 1 tablet (10 mg) by mouth every 6 hours as needed for nausea or vomiting, Disp: 30 tablet, Rfl: 2    tamsulosin (FLOMAX) 0.4 MG capsule, Take 1 capsule (0.4 mg) by mouth daily, Disp: 30 capsule, Rfl: 3    triamterene-HCTZ (MAXZIDE-25) 37.5-25 MG tablet, Take 1 tablet by mouth daily, Disp: , Rfl:   No current facility-administered medications for this visit.      Physical Exam:   /74 (BP Location: Right arm, Patient Position: Sitting, Cuff Size: Adult Large)   Pulse 73   Temp 98.7  F (37.1  C) (Oral)   Resp 16   Wt 116 kg (255 lb 12.8 oz)   SpO2 96%   BMI 35.03 kg/m    Wt Readings from Last 10 Encounters:   06/03/24 116 kg (255 lb 12.8 oz)   05/13/24 117.9 kg (259 lb 14.4 oz)   04/22/24 118.3 kg (260 lb 14.4 oz)   04/01/24 119.4 kg (263 lb 3.2 oz)   03/28/24 116.1 kg (256 lb)   03/11/24 119.3 kg (263 lb 1.6 oz)   02/19/24 121.2 kg (267 lb 4.8 oz)   02/13/24 121.4 kg (267 lb 11.2 oz)   01/29/24 122.7 kg (270 lb 6.4  oz)   01/24/24 117.9 kg (260 lb)   General: The patient is a pleasant male in no acute distress.  HEENT: EOMI. Sclerae are anicteric. Mucous membranes moist without lesions or thrush.   Lymph: Neck is supple with no lymphadenopathy in the cervical or supraclavicular areas.   Heart: Regular rate and rhythm.   Lungs: Clear to auscultation bilaterally, normal work of breathing   Abdomen: Bowel sounds present, soft, nontender with no palpable hepatosplenomegaly or masses.   Extremities: 1+ pitting lower extremity edema in the left leg into the thigh, trace right sided edema.   Neuro: Cranial nerves II through XII are grossly intact.  Skin: No rashes, petechiae, or bruising noted on exposed skin.  Psych: affect bright, alert and oriented    LABORATORY DATA:  Most Recent 3 CBC's:  Recent Labs   Lab Test 06/03/24 0752 05/13/24  0916 04/22/24  0909   WBC 4.1 4.7 4.4   HGB 10.0* 9.7* 9.9*   * 102* 99   * 155 166   ANEUTAUTO 2.8 3.3 3.0     Most Recent 3 BMP's:  Recent Labs   Lab Test 06/03/24  0752 05/22/24  0915 05/13/24  0916 04/22/24  0909    140 142 143   POTASSIUM 3.9 4.1 3.9 4.0   CHLORIDE 106 104 109* 108*   CO2 24 23 23 25   BUN 14.6 12.4 15.3 14.3   CR 1.00 0.89 0.95 0.92   ANIONGAP 10 13 10 10   BETHANY 8.5* 8.5* 8.6* 8.6*   * 97 111* 92   PROTTOTAL 6.2*  --  6.1* 6.0*   ALBUMIN 3.9  --  4.0 4.0    Most Recent 3 LFT's:  Recent Labs   Lab Test 06/03/24 0752 05/13/24  0916 04/22/24  0909   AST 22 21 19   ALT 18 19 22   ALKPHOS 73 76 74   BILITOTAL 0.3 0.3 0.4      Latest Reference Range & Units 02/19/24 11:03 03/11/24 08:49 04/01/24 09:30 04/22/24 09:09 05/13/24 09:16   PSA Tumor Marker 0.00 - 4.50 ng/mL 2.21 1.99 1.74 1.85 2.06       I reviewed the above labs today.      US left lower extremity 4/22/24:   FINDINGS:  In the left lower extremity, the common femoral, femoral, popliteal  and posterior tibial veins demonstrate normal compressibility and  blood flow. The peroneal vein is not  visualized due to swelling.     The right common femoral vein obtained for comparison is unremarkable.     Subcutaneous edema.     CT Abdomen/pelvis with contrast 4/29/24:                                                                  IMPRESSION:  No evidence left lower extremity deep venous thrombosis. Of note, the  left peroneal vein is not visualized.FINDINGS:   LOWER CHEST: Moderate to large hiatal hernia.     HEPATOBILIARY: Hepatic steatosis. No hepatic masses. No calcified  gallstones.     PANCREAS: Normal.     SPLEEN: Normal.     ADRENAL GLANDS: Normal.     KIDNEYS/BLADDER: Right nephrectomy. Nonobstructing 0.2 cm stone in the  lower pole of the left kidney. No hydronephrosis.     BOWEL: No bowel obstruction. Colonic diverticulosis. No evidence for  colitis or diverticulitis. The appendix is mildly thickened at 0.8 cm,  and there is minimal periappendiceal fat stranding.     LYMPH NODES: No lymphadenopathy.     VASCULATURE: Mild atherosclerotic aortoiliac calcification.     PELVIC ORGANS: Mild prostatic enlargement and heterogeneity,  unchanged.     ADDITIONAL FINDINGS: Small fat-containing paraumbilical hernia.     MUSCULOSKELETAL: Scattered sclerotic metastases throughout the  visualized bones are not significantly changed.                                                                      IMPRESSION:   1.  The appendix is mildly thickened at 0.8 cm, and there is minimal  periappendiceal fat stranding. This finding is of uncertain clinical  significance, although a mild or early appendicitis could have this  appearance.  2.  Scattered sclerotic metastases throughout the visualized bones are  not significantly changed.  3.  Hepatic steatosis.  4.  Moderate to large hiatal hernia.    I reviewed the above labs and imaging today.        ASSESSMENT & PLAN: Mr. Reyes is a 64 year old gentleman with metastatic castration sensitive prostate adenocarcinoma as well as an incidentally diagnosed stage III  "clear-cell renal cell carcinoma of the right kidney (s/p radical R nephrectomy 3/19/19), who is here prior to cycle 3 cabazitaxel.      1. Metastatic castration-resistant prostate cancer, with involvement of the bones and RPLN:   - Patient met the criteria for CHAARTED \"high-volume\" metastatic hormone-sensitive prostate cancer. He opted for docetaxel in combination with ADT (per JOSEFA, [id number], STAMPEDE). He was subsequently treated with docetaxel x6 doses in the [facility] setting and enzalutamide.  He initiated Pluvicto in May 2023 with an initial slight decline in PSA, but this began to rise just prior to Cycle 3.  Given his XR evidence of progression in L femur and PSA rise, we re-evaluated his progression with PSMA PET scan in September 2023, with note of mixed response.  Reconsented for  [facility] on 8/28/23.  Due for 3 month collection at end of November 2023.  Canceled Pluvicto Dose for November due to symptomatic progression with PSA rise. PSA is labile. Biopsy results from 11/21 are pending for evaluation of NEPC vs small cell given progression without a significant rise in PSA. [facility] peripheral blood mutation analysis is unrevealing for targetable mutations.   -Last lupron 4/22/24. Zometa was held due to mild hypocalcemia on 4/22/24. Increased PO calcium supplement and post-pone 5/13/24. He continues to have mild hypocalemia on 5/13/24 labs. Last lupron was 4/22/24.  -Palliative to manage further opiate refills. Pain is well controlled with fentanyl patch.   -Dexamethasone burst for RT and one on hand if needed for pain flares.      -Cabazitaxel started 11/27/23 given progression on Pluvicto.  Continue Zofran and Compazine as needed, patient to call clinic if these are not sufficient in controlling nausea.     -Started carbo/cabazitaxel 2/19/24 with good tolerance with expected fatigue and mild nausea. He is experiencing grade 2 neuropathy worsened with cycle 3. Cabazitaxel was dose reduced 20% with " cycle 4. Continue prednisone daily while on cabazitaxel and Neulasta.    -Labs reviewed, proceed with cycle 6 today, continue Cabazitaxel dose reduced 20% for peripheral neuropathy.   If neuropathy continues, will consider a dose reduction of carboplatin as well. PSA declined to 1.74 with cycle 3. But has been slowly up trending. Will need to closely monitor, repeat imaging and follow up with Dr. Mcnair scheduled prior to cycle 7.     2. Pressure in chest, resolved:  Has an ongoing sensation of pressure/need to cough in his chest, primarily against his sternum. Vital signs stable, negative pulmonary physical exam. No chest pain.     3. Bone metastases:    - Noted to have osseous metastatic disease at the time of diagnosis. S/p radiation to C spine and sacrum (Re-irradiation to sacrum).   - worsening radiculopathy down starting in the low back and radiating down the left leg in June. MRI read was without new/acute abnormalities  - Pain continues to fluctuate but overall well controlled/managable   - pain mgmt per palliative care, pain is now well controlled with fentanyl patch.   - RT to L femur 10/2023.  RT for bilateral posterior ribs 12/2023 with Dr. Albert.  - Added to trial list for [id number].    - Encouraged to continue calcium and vitamin D supplementation; continue Zometa 4mg IV every ~3 months.   - Mild hypocalcemia 5/13/24 (calcium 8.6), Zometa held and given 5/20/24. Increased calcium to 1000 mg BID. Continue vitamin d 1,000 mg daily. Calcium still low today at 8.5, continue 1000mg BID with Vitamin D.        4. Stage 3, UISS-high risk ccRCC: s/p R nephrectomy   - Nephrectomy 3/19/19 and noted incidentally.  He is now 4.5 years post-op without evidence for recurrence.   - At this point, there is a minimal risk of recurrence of his RCC given the time since surgery without the use of adjuvant immunotherapy. There is no suspicious adenopathy or other features on his most recent imaging studies.    - Will continue  to monitor with imaging planned for prostate cancer.      5. Sensory neuropathy, Toes/feet Grade 2  - continue monitor for worsening with cabazitaxel.   - Continue acupuncture once weekly. Patient working with insurance to try to increase to twice a week.   - He is on gabapentin managed by palliative care 300 mg in the am, 300 mg in the afternoon, and 600 mg at HS. Discussed with Walter increasing his afternoon dose of gabapentin from 300 mg to 600 mg. He will try this and he will update palliative care as well.      6. Anemia  - likely secondary to chemotherapy. Iron levels adequate on 4/22/24. MCV elevated, will add folate and B12 labs today.      7. Left lower extremity swelling   - US 4/22/24 negative for DVT. CT abdomen pelvis 4/29 without significant or new lymphadenopathy. Continue compression stocking. Following with lymphedema therapy, has a lymphatic massage scheduled next week.        PLAN:   Proceed with C6 cabazitaxel/carboplatin today with a continued 20% dose reduction of cabazitaxel.   Continue prednisone daily while on chemotherapy.   Continue gabapentin and acupuncture for neuropathy.   Keep dexamethasone on hand for pain flares.  Continue Calcium 1000mg BID + vitamin D daily.     Checking Vitamin B12 and folate today due to elevated MCV.   Scans and Dr. Mcnair in June.     50 minutes spent on the date of the encounter doing chart review, review of test results, interpretation of tests, patient visit, and documentation

## 2024-06-03 NOTE — Clinical Note
6/3/2024         RE: Walter Reyes  [address] [facility] [address]        Dear Colleague,    Thank you for referring your patient, Walter Reyes, to the [facility]. Please see a copy of my visit note below.        [facility] Oncology Followup  Catrachito 3, 2024    REASON FOR VISIT: Follow-up for metastatic castration-resistant prostate cancer.      INTERVAL HISTORY:     -Edema in left leg.   -First week is rough, fatigued, nauseated, weak   -Just got back from an [place] cruise , has a mild cold   -mild diarrhea after chemo, goes back to constipation   -    ***   Walter returns to clinic in consideration of cycle 5 carboplatin/cabazitaxel. Patient reports cycle 4 went well. He had his typical pattern of fatigue and nausea the first week with improvement into the second. This past week he felt very well and was able to spend time outside doing yard work. He uses compazine as needed the first week and zofran in addition as needed. He continues to have a couple of days of diarrhea followed by constipation for a few days before his bowels return to normal. No chest pain, shortness of breath, or cough. No fevers or chills. His is getting accupuncture once a week and has been working with Dr. Mcnair on a prior auth to try to increase this to twice a week. He has noticed improvement in his neuropathy he has less pain and numbness into his feet. Primarily now noticing the neuropathy in his toes. He did have one fall this past cycle after tripping over a hose but otherwise no significant falls or balance changes. He was uninjured in the fall. He endorses continued left sided lower extremity swelling possibly mildly improved from before but still quite bothersome especially swelling up into the thigh.     Review of Systems:   ROS: 10 point ROS neg other than the symptoms noted above in the HPI.    ONCOLOGY HISTORY: Mr. Walter Reyes is a 64 year old  "gentleman with a metastatic castration-sensitive prostate cancer and incidentally diagnosed stage 3 clear cell RCC (s/p radical R nephrectomy on 3/19/19) which is now 4.5+ years post-therapy without evidence of recurrence. His oncologic history is detailed below.     1. 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 Naylor at [facility]. Prior PSA were 1.4 on 8/16/17, 2.4 on 6/28/16, 2.9 on 6/28/16, and as low as 0.4 on 3/26/2003.   - 1/08/2019: Consultation with Sarah Wilson CNP in Urology clinic. Repeat PSA 9.6.  - 1/16/2019: MRI prostate with contrast - \"This examination is characterized as PIRADS 5- very high probability. Clinically significant cancer is highly likely to be present. There is a large, invasive mass arising from the right peripheral zone and extending into the neurovascular bundle, seminal vesicle, and along the anterolateral right mesorectal fascia. Metastatic right external iliac lymph node. Metastatic lesion in the posterior/superior right acetabulum.\"  - 1/25/2019: CT abdomen and pelvis with IV contrast - \"1. Heterogeneous enhancing mass posteriorly in the upper pole of the right kidney measures 4.5 x 5.8 x 5.7 cm (AP by transverse by craniocaudal). It has a small nodular component extending posterior medially which abuts the right psoas muscle. This nodular extension measures 2.1 x 2.1 cm. Minimal stranding about the mass. No definite thrombus within the right renal vein. This renal mass is compatible with a renal cell carcinoma. A paraaortic lymph node situated immediately posterior to the left renal vein measures 1.1 cm in short axis, suspicious for metastasis. 2. A 2 cm right external iliac lymph node is also suspicious for metastases. 3. Multiple sclerotic osseous lesions suspicious for metastases. These include 0.8 " "and 0.6 cm sclerotic lesions laterally in the right iliac wing (images 53 and 62 respectively). Ill-defined groundglass density laterally in the right acetabulum measuring 1.7 x 1.2 cm corresponds with the lesion identified on MRI. A 0.4 cm groundglass density in the left acetabulum. Sclerotic lesion in the left femoral neck measures 0.9 x 1.6 cm (image 81). Sclerotic metastases would be more compatible with prostate metastases. 4. A 3 subcentimeter hepatic lesions are indeterminate. Metastases would be a consideration. These can be further characterized with liver MRI.\"  - 1/25/2019: NM bone scan - \"There is focal bony uptake in the left femoral neck, right acetabulum, right of midline at the S1 or L5 level of the spine, within multiple bilateral ribs, in the C7 or T1 level of the spine, and anteriorly within the skull. Findings are suspicious for metastases.\"  - 1/31/19: CT chest with contrast - \" No suspicious nodules in the chest.  Stable appearance of a 5.8 cm right renal mass concerning for renal carcinoma until proven otherwise.  Stable indeterminant subcentimeter hypodensities in the liver.  Multifocal osteoblastic metastasis including 2.5 cm lesion in the right fifth rib posteriorly and a 1.6 cm lesion in the right third rib posteriorly. No lytic lesions identified.\"  - 2/6/19: CT-guided right sclerotic fifth rib lesion biopsy - \"Metastatic carcinoma, consistent with prostate primary.  Immunohistochemical stains performed show the metastatic carcinoma stains positive for NKX3.1 (prostate marker), negative for STACIE 3 and PAX8, which supports the above diagnosis.\"  - 2/15/19: Started Casodex 50mg every day and consented for the biobanking protocol. 3/18/19 - stopped Casodex.  - 2/19/19: Case discussed in tumor board - recommendation for nephectomy for suspected malignant right renal mass.   - 2/26/19: Started Lupron 22.5mg every 3 months.   - 5/8/19: Started Docetaxel 75mg/m2 IV every 3 weeks. 06/18/19 - " "cycle 3, 7/10/19 - cycle 4, 07/31/19 - cycle 5, 08/21/19 - cycle 6.   - 10/2/19: Restaging CT CAP and NM Bone Scan showed improved osseous disease, no evidence of recurrent or new metastatic disease.  - 1/5/20: Restaging CT CAP and NM Bone Scan showed stable osseous disease, no evidence of recurrent or new metastatic disease.  - 4/6/20: NM bone scan with slightly improved uptake in known skeletal mets. CT C/A/P with contrast with stable osseous mets, no yusuf enlargement, no new visceral mets etc.  - 04/08/20: Zometa every 3 months.  - 10/5/20: CT C/A/P with contrast and NM bone scan - SD.   - 1/4/21: CT C/A/P with contrast and NM bone scan - SD but slight increase in right 5th rib tracer uptake and in posterior L5 sclerosis. 1/6/21  PSA = 0.29  - 03/29/21: CT C/A/P with contrast - single new right sacral 8mm bone lesion (suspicious), other bone mets stable. No visceral/yusuf disease. Nephrectomy site without recurrence. NM bone scan - SD but \"increased uptake associated with the prior lesions of the lower cervical spine, posterior right fourth rib and right L5 posterior arch elements. Otherwise unchanged uptake associated with the proximal left femur and left anterior fourth rib. Similar uptake of the left halux, possibly secondary to degenerative osteoarthritis or gout.\"    3/31/21 PSA = 0.44  7/7/21  PSA = 0.47  11/2021 PSA = 1.05  -- Start XTANDI  12/16/21 PSA =0.22  2/11/22 PSA= 0.50  3/22/22 PSA=0.72  5/5/2022 PSA=1.79  7/11/2022 PSA=2.16 --Start cycle 1 docetaxel   8/22/22 PSA = 1.36  9/12/22 PSA = 1.09  10/24/22 Cycle #6 of Docetaxel. End of treatment  1/9/23  PSA =0.66  3/20/23  PSA=1.54  4/21/23 PSA = 1.81  T=15  5/22/23 C1 Pluvicto   06/07/23          PSA = 1.42  7/18/23 PSA=1.75, C2 Pluvicto   8/28/23 Transfer of care initial visit for Tabby.  PSA 2.06.  C3 Pluvicto scheduled for 8/30.  Proceed with dose as planned.  MR DELORIS femur, ortho referral, PSMA PET ordered for prior to follow-up.  " "  10/2/23 Palliative RT to L femur metastasis complete.  PSMA PET with mixed response.  PSA 2.21.  RT to L femur pending.  Continue with Dose #4 of Pluvicto despite mixed response to therapy.  Dex course for possible pain flare with RT.    11/8/23  Follow-up prior to Dose #5 Pluvicto.  More pain in chest/ribs/back. PSA 4.38.  Testosterone=3.  Rad onc referral for ribs.  Biopsy progressive lesion for progression on Pluvicto.  Cabazitaxel scheduled for follow-up appointment.  Tempus blood testing unrevealing for targetable mutation.    11/27/23 Bx completed from R iliac crest but unrevealing for either PCa or RCC.  Start cabazitaxel C1D1.  PSA 2.81.    12/19/23 PSA = 3.66  1/8/24 PSA = 3.55  1/29/24 PSA = 3.07  2/19/24 PSA= 2.21-Start carboplatin/cabazitaxel.    3/11/24 PSA= C2 Carbo/cabazi.  PSA 1.99.    4/01/24 PSA= 1.74. C3 Carbo/cabazi.   4/22/24 PSA= 1.85. C4 Carbo/cabazi.   5/13/24 PSA = pending. C5 Carbo/cabazi.       Radiation history:   -C7         3,000 cGy       06 X      8/05/2021        8/18/2021        13       10  -2 Sacrum          2,500 cGy       18 X      4/12/2022        4/18/2022         6           -Sacrum retreat               700 cGy        18 X      2/28/2023        2/28/2023  -Left femur to 2000 cGy in 5 fractions, completed 10/10/2023   -bilateral posterior chest wall at an area of osseous involvement of the ribs and adjacent T4 and 5 spine, to be delivered to 2000 cGy in 5 fractions.  completed 12/13-12/19/23.      2. Stage III ([id number]), grade 3 of 4, clear-cell RCC of right kidney:  - Incidentally diagnosed as above.   - Underwent curative-intent robotic right radical nephrectomy with Dr. Wesley Cleveland on 3/19/19. No tumor spillage per op report.   - Path showed: \"Histologic Type: Clear cell renal cell carcinoma; Sarcomatoid Features: Not identified; Histologic Grade: Nucleolar grade 3 (WHO/ISUP). Extent Tumor Size: 4.3 cm. Microscopic Tumor Extension: Tumor extension into renal sinus " "(in vascular structures). Margins: Negative. Tumor Necrosis: Present; focal. Lymph-Vascular Invasion: Not identified.  Pathologic Staging (pTNM) Primary Tumor (pT): pT3a: Tumor extends into renal vein branches/renal sinus. Regional Lymph Nodes (pN): pNX. Number of Lymph Nodes Examined: 0 Distant Metastasis (pM): pM N/A.\"  - Restaging scans as above.  No current concerns for recurrence.      PAST MEDICAL HISTORY:  Past Medical History:   Diagnosis Date    Cancer of kidney, right (H)     Complication of anesthesia     slow wakeup     Malignant neoplasm of prostate metastatic to bone (H) 2/14/2019    Thyroid disease      CURRENT MEDICATIONS:   Current Outpatient Medications:     atorvastatin (LIPITOR) 20 MG tablet, Take 60 mg by mouth daily , Disp: , Rfl:     BERBERINE CHLORIDE PO, , Disp: , Rfl:     calcium citrate-vitamin D (CITRACAL) 315-250 MG-UNIT TABS per tablet, Take 500 mg by mouth 2 times daily With 800 Vd per pt, Disp: , Rfl:     cycloSPORINE (RESTASIS) 0.05 % ophthalmic emulsion, Place 1 drop into both eyes 2 times daily , Disp: , Rfl:     dexAMETHasone (DECADRON) 4 MG tablet, Take 2 tablets (8 mg) by mouth daily Take for 3 days, starting the day after chemo. Take with food. (Patient not taking: Reported on 3/28/2024), Disp: 6 tablet, Rfl: 2    dexAMETHasone (DECADRON) 4 MG tablet, Take 1 tablet (4 mg) by mouth daily (Patient not taking: Reported on 3/28/2024), Disp: 7 tablet, Rfl: 2    fentaNYL (DURAGESIC) 25 mcg/hr 72 hr patch, Place 1 patch onto the skin every 72 hours remove old patch., Disp: 10 patch, Rfl: 0    furosemide (LASIX) 20 MG tablet, Take 1 tablet (20 mg) by mouth daily (Patient not taking: Reported on 5/22/2024), Disp: 15 tablet, Rfl: 1    gabapentin (NEURONTIN) 300 MG capsule, Take 1 capsule (300 mg) by mouth every morning AND 2 capsules (600 mg) daily at 2 pm AND 2 capsules (600 mg) at bedtime., Disp: 150 capsule, Rfl: 3    levothyroxine (SYNTHROID/LEVOTHROID) 112 MCG tablet, Take 112 mcg " by mouth daily, Disp: , Rfl:     lidocaine, viscous, (XYLOCAINE) 2 % solution, Swish and swallow 15 mLs in mouth every 3 hours as needed for pain (before meals and at bedtime) ; Max 8 doses/24 hour period. (Patient not taking: Reported on 3/28/2024), Disp: 100 mL, Rfl: 1    loratadine (CLARITIN) 10 MG tablet, Take 10 mg by mouth daily, Disp: , Rfl:     Multiple Vitamins-Minerals (MULTIVITAMIN ADULT EXTRA C PO), Take 1 tablet by mouth every 24 hours, Disp: , Rfl:     naloxone (NARCAN) 4 MG/0.1ML nasal spray, Spray 1 spray (4 mg) into one nostril alternating nostrils as needed for opioid reversal every 2-3 minutes until assistance arrives (Patient not taking: Reported on 5/13/2024), Disp: 0.2 mL, Rfl: 1    Nutritional Supplements (SALMON OIL) CAPS, Take 2 capsules by mouth daily , Disp: , Rfl:     omeprazole (PRILOSEC) 20 MG DR capsule, Take 20 mg by mouth daily, Disp: , Rfl:     ondansetron (ZOFRAN) 8 MG tablet, Take 1 tablet (8 mg) by mouth every 8 hours as needed for nausea (vomiting), Disp: 30 tablet, Rfl: 2    ondansetron (ZOFRAN) 8 MG tablet, Take 1 tablet (8 mg) by mouth every 8 hours as needed for nausea, Disp: 30 tablet, Rfl: 4    oxyCODONE (ROXICODONE) 5 MG tablet, Take 1-2 tablets (5-10 mg) by mouth every 3 hours as needed for severe pain, Disp: 90 tablet, Rfl: 0    predniSONE (DELTASONE) 5 MG tablet, Take 1 tablet (5 mg) by mouth 2 times daily (with meals) for 21 days, Disp: 42 tablet, Rfl: 0    prochlorperazine (COMPAZINE) 10 MG tablet, Take 1 tablet (10 mg) by mouth every 6 hours as needed for nausea or vomiting, Disp: 30 tablet, Rfl: 2    tamsulosin (FLOMAX) 0.4 MG capsule, Take 1 capsule (0.4 mg) by mouth daily, Disp: 30 capsule, Rfl: 3    triamterene-HCTZ (MAXZIDE-25) 37.5-25 MG tablet, Take 1 tablet by mouth daily (Patient not taking: Reported on 12/19/2023), Disp: , Rfl:       Physical Exam:   There were no vitals taken for this visit.  Wt Readings from Last 10 Encounters:   05/13/24 117.9 kg (259  lb 14.4 oz)   04/22/24 118.3 kg (260 lb 14.4 oz)   04/01/24 119.4 kg (263 lb 3.2 oz)   03/28/24 116.1 kg (256 lb)   03/11/24 119.3 kg (263 lb 1.6 oz)   02/19/24 121.2 kg (267 lb 4.8 oz)   02/13/24 121.4 kg (267 lb 11.2 oz)   01/29/24 122.7 kg (270 lb 6.4 oz)   01/24/24 117.9 kg (260 lb)   01/08/24 119.6 kg (263 lb 9.6 oz)   General: The patient is a pleasant male in no acute distress.  HEENT: EOMI. Sclerae are anicteric. Mucous membranes moist without lesions or thrush.   Lymph: Neck is supple with no lymphadenopathy in the cervical or supraclavicular areas.   Heart: Regular rate and rhythm.   Lungs: Clear to auscultation bilaterally.   Abdomen: Bowel sounds present, soft, nontender with no palpable hepatosplenomegaly or masses.   Extremities: 1+ pitting lower extremity edema in the left leg into the thigh, trace right sided edema.   Neuro: Cranial nerves II through XII are grossly intact.  Skin: No rashes, petechiae, or bruising noted on exposed skin.  Psych: affect bright, alert and oriented    LABORATORY DATA:  Most Recent 3 CBC's:  Recent Labs   Lab Test 05/13/24  0916 04/22/24  0909 04/01/24  0930   WBC 4.7 4.4 4.6   HGB 9.7* 9.9* 10.7*   * 99 98    166 160   ANEUTAUTO 3.3 3.0 3.0     Most Recent 3 BMP's:  Recent Labs   Lab Test 05/22/24  0915 05/13/24  0916 04/22/24  0909 04/01/24  0930    142 143 141   POTASSIUM 4.1 3.9 4.0 3.9   CHLORIDE 104 109* 108* 106   CO2 23 23 25 24   BUN 12.4 15.3 14.3 12.4   CR 0.89 0.95 0.92 0.92   ANIONGAP 13 10 10 11   BETHANY 8.5* 8.6* 8.6* 8.6*   GLC 97 111* 92 94   PROTTOTAL  --  6.1* 6.0* 6.3*   ALBUMIN  --  4.0 4.0 4.1    Most Recent 3 LFT's:  Recent Labs   Lab Test 05/13/24  0916 04/22/24  0909 04/01/24  0930   AST 21 19 19   ALT 19 22 20   ALKPHOS 76 74 82   BILITOTAL 0.3 0.4 0.3     PSA ***     I reviewed the above labs today. ***   ***  US left lower extremity 4/22/24:   FINDINGS:  In the left lower extremity, the common femoral, femoral, popliteal  and  posterior tibial veins demonstrate normal compressibility and  blood flow. The peroneal vein is not visualized due to swelling.     The right common femoral vein obtained for comparison is unremarkable.     Subcutaneous edema.     CT Abdomen/pelvis with contrast 4/29/24:                                                                  IMPRESSION:  No evidence left lower extremity deep venous thrombosis. Of note, the  left peroneal vein is not visualized.FINDINGS:   LOWER CHEST: Moderate to large hiatal hernia.     HEPATOBILIARY: Hepatic steatosis. No hepatic masses. No calcified  gallstones.     PANCREAS: Normal.     SPLEEN: Normal.     ADRENAL GLANDS: Normal.     KIDNEYS/BLADDER: Right nephrectomy. Nonobstructing 0.2 cm stone in the  lower pole of the left kidney. No hydronephrosis.     BOWEL: No bowel obstruction. Colonic diverticulosis. No evidence for  colitis or diverticulitis. The appendix is mildly thickened at 0.8 cm,  and there is minimal periappendiceal fat stranding.     LYMPH NODES: No lymphadenopathy.     VASCULATURE: Mild atherosclerotic aortoiliac calcification.     PELVIC ORGANS: Mild prostatic enlargement and heterogeneity,  unchanged.     ADDITIONAL FINDINGS: Small fat-containing paraumbilical hernia.     MUSCULOSKELETAL: Scattered sclerotic metastases throughout the  visualized bones are not significantly changed.                                                                      IMPRESSION:   1.  The appendix is mildly thickened at 0.8 cm, and there is minimal  periappendiceal fat stranding. This finding is of uncertain clinical  significance, although a mild or early appendicitis could have this  appearance.  2.  Scattered sclerotic metastases throughout the visualized bones are  not significantly changed.  3.  Hepatic steatosis.  4.  Moderate to large hiatal hernia.    I reviewed the above labs and imaging today.    PSA trend, see oncology history.      ASSESSMENT & PLAN: Mr. Reyes  "is a 64 year old gentleman with metastatic castration sensitive prostate adenocarcinoma as well as an incidentally diagnosed stage III clear-cell renal cell carcinoma of the right kidney (s/p radical R nephrectomy 3/19/19), who is here prior to cycle 3 cabazitaxel.      1. Metastatic castration-resistant prostate cancer, with involvement of the bones and RPLN:   - Patient met the criteria for CHAARTED \"high-volume\" metastatic hormone-sensitive prostate cancer. He opted for docetaxel in combination with ADT (per JOSEFA, [id number], STAMPEDE). He was subsequently treated with docetaxel x6 doses in the [facility] setting and enzalutamide.  He initiated Pluvicto in May 2023 with an initial slight decline in PSA, but this began to rise just prior to Cycle 3.  Given his XR evidence of progression in L femur and PSA rise, we re-evaluated his progression with PSMA PET scan in September 2023, with note of mixed response.  Reconsented for  [employer] on 8/28/23.  Due for 3 month collection at end of November 2023.  Canceled Pluvicto Dose for November due to symptomatic progression with PSA rise. PSA is labile. Biopsy results from 11/21 are pending for evaluation of NEPC vs small cell given progression without a significant rise in PSA. [facility] peripheral blood mutation analysis is unrevealing for targetable mutations.   -Last lupron 4/22/24. Zometa was held due to mild hypocalcemia on 4/22/24. Increased PO calcium supplement and post-pone 5/13/24. He continues to have mild hypocalemia on 5/13/24 labs. Last lupron was 4/22/24.  -Palliative to manage further opiate refills. Pain is well controlled with fentanyl patch.   -Dexamethasone burst for RT and one on hand if needed for pain flares.      -Cabazitaxel started 11/27/23 given progression on Pluvicto.  Continue Zofran and Compazine as needed, patient to call clinic if these are not sufficient in controlling nausea.     -Started carbo/cabazitaxel 2/19/24 with good tolerance with " expectd fatigue and mild nausea. He is experiencing grade 2 neuropathy worsened with cycle 3. Continue prednisone daily while on cabazitaxel and Neulasta.    -Will continue with a 20% dose reduction of cabazitaxel with cycle 5 today given improvement in neuropathy with reduction. If neuropathy continues, will consider a dose reduction of carboplatin as well. PSA declined to 1.74 with cycle 3. But has been slowly up trending to 2.06 today 5/13/24. Will need to closely monitor and continue to assess benefit.     2. Pressure in chest, resolved:  Has an ongoing sensation of pressure/need to cough in his chest, primarily against his sternum. Vital signs stable, negative pulmonary physical exam. No chest pain today 5/13/24.     3. Bone metastases:    - Noted to have osseous metastatic disease at the time of diagnosis. S/p radiation to C spine and sacrum (Re-irradiation to sacrum).   - worsening radiculopathy down starting in the low back and radiating down the left leg in June. MRI read was without new/acute abnormalities  - Pain continues to fluctuate but overall well controlled/managable   - pain mgmt per palliative care, pain is now well controlled with fentanyl patch.   - Encouraged to continue calcium and vitamin D supplementation; continue Zometa 4mg IV every ~3 months. Due to mild hypocalcemia today will hold zometa 5/13/24. Increase calcium to 1000 mg BID. Continue vitamin d 1,000 mg daily. Reschedule in 1 week, around 5/20/24.     - RT to L femur 10/2023.  RT for bilateral posterior ribs 12/2023 with Dr. Albert.  - Added to trial list for [id number].       4. Stage 3, UISS-high risk ccRCC: s/p R nephrectomy   - Nephrectomy 3/19/19 and noted incidentally.  He is now 4.5 years post-op without evidence for recurrence.   - At this point, there is a minimal risk of recurrence of his RCC given the time since surgery without the use of adjuvant immunotherapy. There is no suspicious adenopathy or other features on his most  recent imaging studies.    - Will continue to monitor with imaging planned for prostate cancer.      5. Sensory neuropathy, Toes/feet Grade 2  - continue monitor for worsening with cabazitaxel.   - Continue acupuncture once weekly. Patient working with insurance to try to increase to twice a week.   - He is on gabapentin managed by palliative care 300 mg in the am, 300 mg in the afternoon, and 600 mg at HS. Discussed with Walter increasing his afternoon dose of gabapentin from 300 mg to 600 mg. He will try this and he will update palliative care as well.      6. Anemia  - likely secondary to chemotherapy. Will recheck iron stores today. If low, will have patient increase his iron to three times a week instead of every 5 days.     7. Left lower extremity swelling   - US 4/22/24 negative for DVT. CT abdomen pelvis 4/29 without significant or new lymphadenopathy. Continue compression stocking. *** Will refer to lymphedema as therapy and trial of low dose furosemide especially while traveling. Will proceed with an echo today as well.     PLAN:   Proceed with C5 cabazitaxel/carboplatin today with a continued 20% dose reduction of cabazitaxel.   Continue prednisone daily while on chemotherapy.   Keep dexamethasone on hand for pain flares.    Return 1 week for zometa and in 3 weeks for next cycle (C6) as scheduled.   Echo today.   Scans and Dr. Mcnair in June.     *** minutes spent on the date of the encounter doing chart review, review of test results, interpretation of tests, patient visit, and documentation           [facility] Oncology Followup  Catrachito 3, 2024    REASON FOR VISIT: Follow-up for metastatic castration-resistant prostate cancer.      INTERVAL HISTORY:   Walter presents today feeling tired after returning home from vacation last night. He denies any new concerns today.   -Edema in left leg is unchanged, has a lymphedema massage next week.   -First week is rough with fatigue, nauseate and weakness. Using a  walker to ambulate at home. Still trying to go for short walks outside to stay active. Taking Compazine and Zofran PRN, still eating and no vomiting.   -Just got back from an [place] cruise , has a mild cold with a non productive cough and nasal/ear congestion. No fevers.   -mild diarrhea after chemo, goes back to constipation after a few days. Taking 1/2 tablet of imodium or stool softeners as needed.   -neuropathy is unchanged, continues with acupuncture that has been very helpful.   -still feels the pressure in his chest along the sternum, but it has improved. Has dyspnea with exertion, denies any at rest. No chest pain.   -pain is well controlled on fentanyl patch and oxycodone at bedtime. Has pain in his left femur and occasionally in his forehead, neither are new or changed in severity.        Review of Systems:  Patient denies any of the following except if noted above: fevers, chills, difficulty with energy, vision or hearing changes, chest pain, dyspnea, abdominal pain, nausea, vomiting, diarrhea, constipation, urinary concerns, headaches, numbness, tingling, issues with sleep or mood. Also denies lumps, bumps, rashes or skin lesions, bleeding or bruising issues.      ONCOLOGY HISTORY: Mr. 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 His oncologic history is detailed below.     1. 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 Naylor at [facility]. Prior PSA were 1.4 on 8/16/17, 2.4 on 6/28/16, 2.9 on 6/28/16, and as low as 0.4 on 3/26/2003.   - 1/08/2019: Consultation with Saarh Wilson CNP in Urology clinic. Repeat PSA  "9.6.  - 1/16/2019: MRI prostate with contrast - \"This examination is characterized as PIRADS 5- very high probability. Clinically significant cancer is highly likely to be present. There is a large, invasive mass arising from the right peripheral zone and extending into the neurovascular bundle, seminal vesicle, and along the anterolateral right mesorectal fascia. Metastatic right external iliac lymph node. Metastatic lesion in the posterior/superior right acetabulum.\"  - 1/25/2019: CT abdomen and pelvis with IV contrast - \"1. Heterogeneous enhancing mass posteriorly in the upper pole of the right kidney measures 4.5 x 5.8 x 5.7 cm (AP by transverse by craniocaudal). It has a small nodular component extending posterior medially which abuts the right psoas muscle. This nodular extension measures 2.1 x 2.1 cm. Minimal stranding about the mass. No definite thrombus within the right renal vein. This renal mass is compatible with a renal cell carcinoma. A paraaortic lymph node situated immediately posterior to the left renal vein measures 1.1 cm in short axis, suspicious for metastasis. 2. A 2 cm right external iliac lymph node is also suspicious for metastases. 3. Multiple sclerotic osseous lesions suspicious for metastases. These include 0.8 and 0.6 cm sclerotic lesions laterally in the right iliac wing (images 53 and 62 respectively). Ill-defined groundglass density laterally in the right acetabulum measuring 1.7 x 1.2 cm corresponds with the lesion identified on MRI. A 0.4 cm groundglass density in the left acetabulum. Sclerotic lesion in the left femoral neck measures 0.9 x 1.6 cm (image 81). Sclerotic metastases would be more compatible with prostate metastases. 4. A 3 subcentimeter hepatic lesions are indeterminate. Metastases would be a consideration. These can be further characterized with liver MRI.\"  - 1/25/2019: NM bone scan - \"There is focal bony uptake in the left femoral neck, right acetabulum, right of " "midline at the S1 or L5 level of the spine, within multiple bilateral ribs, in the C7 or T1 level of the spine, and anteriorly within the skull. Findings are suspicious for metastases.\"  - 1/31/19: CT chest with contrast - \" No suspicious nodules in the chest.  Stable appearance of a 5.8 cm right renal mass concerning for renal carcinoma until proven otherwise.  Stable indeterminant subcentimeter hypodensities in the liver.  Multifocal osteoblastic metastasis including 2.5 cm lesion in the right fifth rib posteriorly and a 1.6 cm lesion in the right third rib posteriorly. No lytic lesions identified.\"  - 2/6/19: CT-guided right sclerotic fifth rib lesion biopsy - \"Metastatic carcinoma, consistent with prostate primary.  Immunohistochemical stains performed show the metastatic carcinoma stains positive for NKX3.1 (prostate marker), negative for STACIE 3 and PAX8, which supports the above diagnosis.\"  - 2/15/19: Started Casodex 50mg every day and consented for the biobanking protocol. 3/18/19 - stopped Casodex.  - 2/19/19: Case discussed in tumor board - recommendation for nephectomy for suspected malignant right renal mass.   - 2/26/19: Started Lupron 22.5mg every 3 months.   - 5/8/19: Started Docetaxel 75mg/m2 IV every 3 weeks. 06/18/19 - cycle 3, 7/10/19 - cycle 4, 07/31/19 - cycle 5, 08/21/19 - cycle 6.   - 10/2/19: Restaging CT CAP and NM Bone Scan showed improved osseous disease, no evidence of recurrent or new metastatic disease.  - 1/5/20: Restaging CT CAP and NM Bone Scan showed stable osseous disease, no evidence of recurrent or new metastatic disease.  - 4/6/20: NM bone scan with slightly improved uptake in known skeletal mets. CT C/A/P with contrast with stable osseous mets, no yusuf enlargement, no new visceral mets etc.  - 04/08/20: Zometa every 3 months.  - 10/5/20: CT C/A/P with contrast and NM bone scan - SD.   - 1/4/21: CT C/A/P with contrast and NM bone scan - SD but slight increase in right 5th rib " "tracer uptake and in posterior L5 sclerosis. 1/6/21  PSA = 0.29  - 03/29/21: CT C/A/P with contrast - single new right sacral 8mm bone lesion (suspicious), other bone mets stable. No visceral/yusuf disease. Nephrectomy site without recurrence. NM bone scan - SD but \"increased uptake associated with the prior lesions of the lower cervical spine, posterior right fourth rib and right L5 posterior arch elements. Otherwise unchanged uptake associated with the proximal left femur and left anterior fourth rib. Similar uptake of the left halux, possibly secondary to degenerative osteoarthritis or gout.\"    3/31/21 PSA = 0.44  7/7/21  PSA = 0.47  11/2021 PSA = 1.05  -- Start XTANDI  12/16/21 PSA =0.22  2/11/22 PSA= 0.50  3/22/22 PSA=0.72  5/5/2022 PSA=1.79  7/11/2022 PSA=2.16 --Start cycle 1 docetaxel   8/22/22 PSA = 1.36  9/12/22 PSA = 1.09  10/24/22 Cycle #6 of Docetaxel. End of treatment  1/9/23  PSA =0.66  3/20/23  PSA=1.54  4/21/23 PSA = 1.81  T=15  5/22/23 C1 Pluvicto   06/07/23          PSA = 1.42  7/18/23 PSA=1.75, C2 Pluvicto   8/28/23 Transfer of care initial visit for Tabby.  PSA 2.06.  C3 Pluvicto scheduled for 8/30.  Proceed with dose as planned.  MR L femur, ortho referral, PSMA PET ordered for prior to follow-up.    10/2/23 Palliative RT to L femur metastasis complete.  PSMA PET with mixed response.  PSA 2.21.  RT to L femur pending.  Continue with Dose #4 of Pluvicto despite mixed response to therapy.  Dex course for possible pain flare with RT.    11/8/23  Follow-up prior to Dose #5 Pluvicto.  More pain in chest/ribs/back. PSA 4.38.  Testosterone=3.  Rad onc referral for ribs.  Biopsy progressive lesion for progression on Pluvicto.  Cabazitaxel scheduled for follow-up appointment.  Tempus blood testing unrevealing for targetable mutation.    11/27/23 Bx completed from R iliac crest but unrevealing for either PCa or RCC.  Start cabazitaxel C1D1.  PSA 2.81.    12/19/23 PSA = 3.66  1/8/24 PSA = 3.55  1/29/24 " "PSA = 3.07  2/19/24 PSA= 2.21-Start carboplatin/cabazitaxel.    3/11/24 PSA= C2 Carbo/cabazi.  PSA 1.99.    4/01/24 PSA= 1.74. C3 Carbo/cabazi.   4/22/24 PSA= 1.85. C4 Carbo/cabazi.   5/13/24 PSA = 2.06. C5 Carbo/cabazi.   6/3/24 PSA = pending, cycle 6 Carboplatin/cabazitaxel    Radiation history:   -C7         3,000 cGy       06 X      8/05/2021        8/18/2021        13       10  -2 Sacrum          2,500 cGy       18 X      4/12/2022        4/18/2022         6           -Sacrum retreat               700 cGy        18 X      2/28/2023        2/28/2023  -Left femur to 2000 cGy in 5 fractions, completed 10/10/2023   -bilateral posterior chest wall at an area of osseous involvement of the ribs and adjacent T4 and 5 spine, to be delivered to 2000 cGy in 5 fractions.  completed 12/13-12/19/23.      2. Stage III ([id number]), grade 3 of 4, clear-cell RCC of right kidney:  - Incidentally diagnosed as above.   - Underwent curative-intent robotic right radical nephrectomy with Dr. Wesley Cleveland on 3/19/19. No tumor spillage per op report.   - Path showed: \"Histologic Type: Clear cell renal cell carcinoma; Sarcomatoid Features: Not identified; Histologic Grade: Nucleolar grade 3 (WHO/ISUP). Extent Tumor Size: 4.3 cm. Microscopic Tumor Extension: Tumor extension into renal sinus (in vascular structures). Margins: Negative. Tumor Necrosis: Present; focal. Lymph-Vascular Invasion: Not identified.  Pathologic Staging (pTNM) Primary Tumor (pT): pT3a: Tumor extends into renal vein branches/renal sinus. Regional Lymph Nodes (pN): pNX. Number of Lymph Nodes Examined: 0 Distant Metastasis (pM): pM N/A.\"  - Restaging scans as above.  No current concerns for recurrence.      PAST MEDICAL HISTORY:  Past Medical History:   Diagnosis Date     Cancer of kidney, right (H)      Complication of anesthesia     slow wakeup      Malignant neoplasm of prostate metastatic to bone (H) 2/14/2019     Thyroid disease      CURRENT MEDICATIONS: "   Current Outpatient Medications:      atorvastatin (LIPITOR) 20 MG tablet, Take 60 mg by mouth daily , Disp: , Rfl:      BERBERINE CHLORIDE PO, , Disp: , Rfl:      calcium citrate-vitamin D (CITRACAL) 315-250 MG-UNIT TABS per tablet, Take 500 mg by mouth 2 times daily With 800 Vd per pt, Disp: , Rfl:      cycloSPORINE (RESTASIS) 0.05 % ophthalmic emulsion, Place 1 drop into both eyes 2 times daily , Disp: , Rfl:      dexAMETHasone (DECADRON) 4 MG tablet, Take 2 tablets (8 mg) by mouth daily Take for 3 days, starting the day after chemo. Take with food., Disp: 6 tablet, Rfl: 2     dexAMETHasone (DECADRON) 4 MG tablet, Take 1 tablet (4 mg) by mouth daily, Disp: 7 tablet, Rfl: 2     fentaNYL (DURAGESIC) 25 mcg/hr 72 hr patch, Place 1 patch onto the skin every 72 hours remove old patch., Disp: 10 patch, Rfl: 0     furosemide (LASIX) 20 MG tablet, Take 1 tablet (20 mg) by mouth daily, Disp: 15 tablet, Rfl: 1     gabapentin (NEURONTIN) 300 MG capsule, Take 1 capsule (300 mg) by mouth every morning AND 2 capsules (600 mg) daily at 2 pm AND 2 capsules (600 mg) at bedtime., Disp: 150 capsule, Rfl: 3     levothyroxine (SYNTHROID/LEVOTHROID) 112 MCG tablet, Take 112 mcg by mouth daily, Disp: , Rfl:      lidocaine, viscous, (XYLOCAINE) 2 % solution, Swish and swallow 15 mLs in mouth every 3 hours as needed for pain (before meals and at bedtime) ; Max 8 doses/24 hour period., Disp: 100 mL, Rfl: 1     loratadine (CLARITIN) 10 MG tablet, Take 10 mg by mouth daily, Disp: , Rfl:      Multiple Vitamins-Minerals (MULTIVITAMIN ADULT EXTRA C PO), Take 1 tablet by mouth every 24 hours, Disp: , Rfl:      naloxone (NARCAN) 4 MG/0.1ML nasal spray, Spray 1 spray (4 mg) into one nostril alternating nostrils as needed for opioid reversal every 2-3 minutes until assistance arrives, Disp: 0.2 mL, Rfl: 1     Nutritional Supplements (SALMON OIL) CAPS, Take 2 capsules by mouth daily , Disp: , Rfl:      omeprazole (PRILOSEC) 20 MG DR capsule, Take  20 mg by mouth daily, Disp: , Rfl:      ondansetron (ZOFRAN) 8 MG tablet, Take 1 tablet (8 mg) by mouth every 8 hours as needed for nausea (vomiting), Disp: 30 tablet, Rfl: 2     ondansetron (ZOFRAN) 8 MG tablet, Take 1 tablet (8 mg) by mouth every 8 hours as needed for nausea, Disp: 30 tablet, Rfl: 4     oxyCODONE (ROXICODONE) 5 MG tablet, Take 1-2 tablets (5-10 mg) by mouth every 3 hours as needed for severe pain, Disp: 90 tablet, Rfl: 0     predniSONE (DELTASONE) 5 MG tablet, Take 1 tablet (5 mg) by mouth 2 times daily (with meals) for 21 days, Disp: 42 tablet, Rfl: 0     prochlorperazine (COMPAZINE) 10 MG tablet, Take 1 tablet (10 mg) by mouth every 6 hours as needed for nausea or vomiting, Disp: 30 tablet, Rfl: 2     tamsulosin (FLOMAX) 0.4 MG capsule, Take 1 capsule (0.4 mg) by mouth daily, Disp: 30 capsule, Rfl: 3     triamterene-HCTZ (MAXZIDE-25) 37.5-25 MG tablet, Take 1 tablet by mouth daily, Disp: , Rfl:   No current facility-administered medications for this visit.      Physical Exam:   /74 (BP Location: Right arm, Patient Position: Sitting, Cuff Size: Adult Large)   Pulse 73   Temp 98.7  F (37.1  C) (Oral)   Resp 16   Wt 116 kg (255 lb 12.8 oz)   SpO2 96%   BMI 35.03 kg/m    Wt Readings from Last 10 Encounters:   06/03/24 116 kg (255 lb 12.8 oz)   05/13/24 117.9 kg (259 lb 14.4 oz)   04/22/24 118.3 kg (260 lb 14.4 oz)   04/01/24 119.4 kg (263 lb 3.2 oz)   03/28/24 116.1 kg (256 lb)   03/11/24 119.3 kg (263 lb 1.6 oz)   02/19/24 121.2 kg (267 lb 4.8 oz)   02/13/24 121.4 kg (267 lb 11.2 oz)   01/29/24 122.7 kg (270 lb 6.4 oz)   01/24/24 117.9 kg (260 lb)   General: The patient is a pleasant male in no acute distress.  HEENT: EOMI. Sclerae are anicteric. Mucous membranes moist without lesions or thrush.   Lymph: Neck is supple with no lymphadenopathy in the cervical or supraclavicular areas.   Heart: Regular rate and rhythm.   Lungs: Clear to auscultation bilaterally, normal work of breathing    Abdomen: Bowel sounds present, soft, nontender with no palpable hepatosplenomegaly or masses.   Extremities: 1+ pitting lower extremity edema in the left leg into the thigh, trace right sided edema.   Neuro: Cranial nerves II through XII are grossly intact.  Skin: No rashes, petechiae, or bruising noted on exposed skin.  Psych: affect bright, alert and oriented    LABORATORY DATA:  Most Recent 3 CBC's:  Recent Labs   Lab Test 06/03/24  0752 05/13/24  0916 04/22/24  0909   WBC 4.1 4.7 4.4   HGB 10.0* 9.7* 9.9*   * 102* 99   * 155 166   ANEUTAUTO 2.8 3.3 3.0     Most Recent 3 BMP's:  Recent Labs   Lab Test 06/03/24 0752 05/22/24  0915 05/13/24  0916 04/22/24  0909    140 142 143   POTASSIUM 3.9 4.1 3.9 4.0   CHLORIDE 106 104 109* 108*   CO2 24 23 23 25   BUN 14.6 12.4 15.3 14.3   CR 1.00 0.89 0.95 0.92   ANIONGAP 10 13 10 10   BETHANY 8.5* 8.5* 8.6* 8.6*   * 97 111* 92   PROTTOTAL 6.2*  --  6.1* 6.0*   ALBUMIN 3.9  --  4.0 4.0    Most Recent 3 LFT's:  Recent Labs   Lab Test 06/03/24 0752 05/13/24  0916 04/22/24  0909   AST 22 21 19   ALT 18 19 22   ALKPHOS 73 76 74   BILITOTAL 0.3 0.3 0.4      Latest Reference Range & Units 02/19/24 11:03 03/11/24 08:49 04/01/24 09:30 04/22/24 09:09 05/13/24 09:16   PSA Tumor Marker 0.00 - 4.50 ng/mL 2.21 1.99 1.74 1.85 2.06       I reviewed the above labs today.      US left lower extremity 4/22/24:   FINDINGS:  In the left lower extremity, the common femoral, femoral, popliteal  and posterior tibial veins demonstrate normal compressibility and  blood flow. The peroneal vein is not visualized due to swelling.     The right common femoral vein obtained for comparison is unremarkable.     Subcutaneous edema.     CT Abdomen/pelvis with contrast 4/29/24:                                                                  IMPRESSION:  No evidence left lower extremity deep venous thrombosis. Of note, the  left peroneal vein is not visualized.FINDINGS:   LOWER CHEST:  "Moderate to large hiatal hernia.     HEPATOBILIARY: Hepatic steatosis. No hepatic masses. No calcified  gallstones.     PANCREAS: Normal.     SPLEEN: Normal.     ADRENAL GLANDS: Normal.     KIDNEYS/BLADDER: Right nephrectomy. Nonobstructing 0.2 cm stone in the  lower pole of the left kidney. No hydronephrosis.     BOWEL: No bowel obstruction. Colonic diverticulosis. No evidence for  colitis or diverticulitis. The appendix is mildly thickened at 0.8 cm,  and there is minimal periappendiceal fat stranding.     LYMPH NODES: No lymphadenopathy.     VASCULATURE: Mild atherosclerotic aortoiliac calcification.     PELVIC ORGANS: Mild prostatic enlargement and heterogeneity,  unchanged.     ADDITIONAL FINDINGS: Small fat-containing paraumbilical hernia.     MUSCULOSKELETAL: Scattered sclerotic metastases throughout the  visualized bones are not significantly changed.                                                                      IMPRESSION:   1.  The appendix is mildly thickened at 0.8 cm, and there is minimal  periappendiceal fat stranding. This finding is of uncertain clinical  significance, although a mild or early appendicitis could have this  appearance.  2.  Scattered sclerotic metastases throughout the visualized bones are  not significantly changed.  3.  Hepatic steatosis.  4.  Moderate to large hiatal hernia.    I reviewed the above labs and imaging today.        ASSESSMENT & PLAN: Mr. Reyes is a 64 year old gentleman with metastatic castration sensitive prostate adenocarcinoma as well as an incidentally diagnosed stage III clear-cell renal cell carcinoma of the right kidney (s/p radical R nephrectomy 3/19/19), who is here prior to cycle 3 cabazitaxel.      1. Metastatic castration-resistant prostate cancer, with involvement of the bones and RPLN:   - Patient met the criteria for CHAARTED \"high-volume\" metastatic hormone-sensitive prostate cancer. He opted for docetaxel in combination with ADT (per " JOSEFA, SHAVONNE-[id number], KARLA). He was subsequently treated with docetaxel x6 doses in the [facility] setting and enzalutamide.  He initiated Pluvicto in May 2023 with an initial slight decline in PSA, but this began to rise just prior to Cycle 3.  Given his XR evidence of progression in L femur and PSA rise, we re-evaluated his progression with PSMA PET scan in September 2023, with note of mixed response.  Reconsented for  [facility] on 8/28/23.  Due for 3 month collection at end of November 2023.  Canceled Pluvicto Dose for November due to symptomatic progression with PSA rise. PSA is labile. Biopsy results from 11/21 are pending for evaluation of NEPC vs small cell given progression without a significant rise in PSA. [facility] peripheral blood mutation analysis is unrevealing for targetable mutations.   -Last lupron 4/22/24. Zometa was held due to mild hypocalcemia on 4/22/24. Increased PO calcium supplement and post-pone 5/13/24. He continues to have mild hypocalemia on 5/13/24 labs. Last lupron was 4/22/24.  -Palliative to manage further opiate refills. Pain is well controlled with fentanyl patch.   -Dexamethasone burst for RT and one on hand if needed for pain flares.      -Cabazitaxel started 11/27/23 given progression on Pluvicto.  Continue Zofran and Compazine as needed, patient to call clinic if these are not sufficient in controlling nausea.     -Started carbo/cabazitaxel 2/19/24 with good tolerance with expected fatigue and mild nausea. He is experiencing grade 2 neuropathy worsened with cycle 3. Cabazitaxel was dose reduced 20% with cycle 4. Continue prednisone daily while on cabazitaxel and Neulasta.    -Labs reviewed, proceed with cycle 6 today, continue Cabazitaxel dose reduced 20% for peripheral neuropathy.   If neuropathy continues, will consider a dose reduction of carboplatin as well. PSA declined to 1.74 with cycle 3. But has been slowly up trending. Will need to closely monitor, repeat imaging and  follow up with Dr. Mcnair scheduled prior to cycle 7.     2. Pressure in chest, resolved:  Has an ongoing sensation of pressure/need to cough in his chest, primarily against his sternum. Vital signs stable, negative pulmonary physical exam. No chest pain.     3. Bone metastases:    - Noted to have osseous metastatic disease at the time of diagnosis. S/p radiation to C spine and sacrum (Re-irradiation to sacrum).   - worsening radiculopathy down starting in the low back and radiating down the left leg in June. MRI read was without new/acute abnormalities  - Pain continues to fluctuate but overall well controlled/managable   - pain mgmt per palliative care, pain is now well controlled with fentanyl patch.   - RT to L femur 10/2023.  RT for bilateral posterior ribs 12/2023 with Dr. Albert.  - Added to trial list for [id number].    - Encouraged to continue calcium and vitamin D supplementation; continue Zometa 4mg IV every ~3 months.   - Mild hypocalcemia 5/13/24 (calcium 8.6), Zometa held and given 5/20/24. Increased calcium to 1000 mg BID. Continue vitamin d 1,000 mg daily. Calcium still low today at 8.5, continue 1000mg BID with Vitamin D.        4. Stage 3, UISS-high risk ccRCC: s/p R nephrectomy   - Nephrectomy 3/19/19 and noted incidentally.  He is now 4.5 years post-op without evidence for recurrence.   - At this point, there is a minimal risk of recurrence of his RCC given the time since surgery without the use of adjuvant immunotherapy. There is no suspicious adenopathy or other features on his most recent imaging studies.    - Will continue to monitor with imaging planned for prostate cancer.      5. Sensory neuropathy, Toes/feet Grade 2  - continue monitor for worsening with cabazitaxel.   - Continue acupuncture once weekly. Patient working with insurance to try to increase to twice a week.   - He is on gabapentin managed by palliative care 300 mg in the am, 300 mg in the afternoon, and 600 mg at HS. Discussed  with Walter increasing his afternoon dose of gabapentin from 300 mg to 600 mg. He will try this and he will update palliative care as well.      6. Anemia  - likely secondary to chemotherapy. Iron levels adequate on 4/22/24. MCV elevated, will add folate and B12 labs today.      7. Left lower extremity swelling   - US 4/22/24 negative for DVT. CT abdomen pelvis 4/29 without significant or new lymphadenopathy. Continue compression stocking. Following with lymphedema therapy, has a lymphatic massage scheduled next week.        PLAN:   Proceed with C6 cabazitaxel/carboplatin today with a continued 20% dose reduction of cabazitaxel.   Continue prednisone daily while on chemotherapy.   Continue gabapentin and acupuncture for neuropathy.   Keep dexamethasone on hand for pain flares.  Continue Calcium 1000mg BID + vitamin D daily.     Checking Vitamin B12 and folate today due to elevated MCV.   Scans and Dr. Mcnair in June.     *** minutes spent on the date of the encounter doing chart review, review of test results, interpretation of tests, patient visit, and documentation         Again, thank you for allowing me to participate in the care of your patient.        Sincerely,        LATESHA Humphreys CNP

## 2024-06-05 LAB — TESTOST SERPL-MCNC: <2 NG/DL (ref 240–950)

## 2024-06-06 ENCOUNTER — MYC MEDICAL ADVICE (OUTPATIENT)
Dept: ADMINISTRATIVE | Facility: CLINIC | Age: 64
End: 2024-06-06
Payer: COMMERCIAL

## 2024-06-07 DIAGNOSIS — C61 PROSTATE CANCER (H): ICD-10-CM

## 2024-06-07 DIAGNOSIS — G89.3 CANCER ASSOCIATED PAIN: ICD-10-CM

## 2024-06-07 RX ORDER — FENTANYL 25 UG/1
1 PATCH TRANSDERMAL
Qty: 10 PATCH | Refills: 0 | Status: SHIPPED | OUTPATIENT
Start: 2024-06-07 | End: 2024-07-03

## 2024-06-07 NOTE — TELEPHONE ENCOUNTER
Received [employer] message from patient requesting refill of fentanyl.     Last refill: 5/9/24  Last office visit: 3/28/24  Scheduled for follow up 6/27/24     Will route request to NP for review.     Reviewed [place]  Report.

## 2024-06-10 ENCOUNTER — THERAPY VISIT (OUTPATIENT)
Dept: OCCUPATIONAL THERAPY | Facility: CLINIC | Age: 64
End: 2024-06-10
Payer: COMMERCIAL

## 2024-06-10 DIAGNOSIS — C61 MALIGNANT NEOPLASM OF PROSTATE METASTATIC TO BONE (H): ICD-10-CM

## 2024-06-10 DIAGNOSIS — C79.51 MALIGNANT NEOPLASM OF PROSTATE METASTATIC TO BONE (H): ICD-10-CM

## 2024-06-10 DIAGNOSIS — I89.0 LYMPHEDEMA: Primary | ICD-10-CM

## 2024-06-10 DIAGNOSIS — R60.0 PERIPHERAL EDEMA: ICD-10-CM

## 2024-06-10 PROCEDURE — 97140 MANUAL THERAPY 1/> REGIONS: CPT | Mod: GO | Performed by: OCCUPATIONAL THERAPY ASSISTANT

## 2024-06-10 NOTE — PROGRESS NOTES
OCCUPATIONAL THERAPY EVALUATION  Type of Visit: Evaluation    See electronic medical record for Abuse and Falls Screening details.    Subjective      Presenting condition or subjective complaint: Edema in left leg. Started with lower leg only. Now upper too. Sometimes worse than others.  Date of onset:      Relevant medical history: Cancer   Dates & types of surgery: Kidney removed March 2019    Prior diagnostic imaging/testing results: CT scan; EMG     Prior therapy history for the same diagnosis, illness or injury: No      Prior Level of Function  Transfers: Independent  Ambulation: Independent  ADL: Independent  IADL: Driving, Finances, Medication management, Yard work    Living Environment  Social support: With a significant other or spouse   Type of home: House   Stairs to enter the home: No       Ramp: No   Stairs inside the home: Yes 1 Is there a railing: Yes   Help at home: None  Equipment owned: Straight Cane; Walker     Employment: No    Hobbies/Interests:      Patient goals for therapy: Lift my leg comfortably. Relieve concern about why yhe swelling.    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 A lower score indicates greater impairment:    Shoulder Pain and Disability Index (score out of 100).  A higher score indicates greater impairment:      Cognitive Status Examination  Orientation: Oriented to person, place and time   Level of Consciousness: Alert  Follows Commands and Answers Questions: 100% of the time  Personal Safety and Judgement:  Intact  Memory: Intact    EDEMA  Skin Condition: WFL, Intact, Pitting  Scar: No  Capillary Refill: Symmetrical  Radial Pulse:  NT  Dorsal Pedal Pulse: Symmetrical  Stemmer Sign: +  Ulceration: No    GIRTH MEASUREMENTS: Refer to separate girth measurement flowsheet.     VOLUME LE  Right LE (mL) 7866.2   Left LE (mL) 9818.33   LE Volume Comparison LLE volume greater than RLE volume   % Difference 24.8%   Head/Neck Volume     Trunk Volume    Genital Volume       RANGE OF MOTION: LE ROM WFL  STRENGTH: UE Strength WFL  POSTURE: WFL  PALPATION: negative   ACTIVITIES OF DAILY LIVING: functionally independent   BED MOBILITY: WFL  TRANSFERS: WFL  GAIT/LOCOMOTION: unremarkable  BALANCE: WFL  SENSATION:  some impaired sensation d/t neuropathy  VASCULAR:  unremarkable  COORDINATION: WFL  MUSCLE TONE: WFL    Assessment & Plan   CLINICAL IMPRESSIONS  Medical Diagnosis: Lymphedema    Treatment Diagnosis: Lymphedema    Impression/Assessment: Pt is a 64 year old male presenting to Occupational Therapy due to Lymphedema as a result of Metastatic prostate cancer. Chart notes negative US for DVT..  The following significant findings have been identified: Impaired activity tolerance, Impaired balance, and Pain.  These identified deficits interfere with their ability to perform self care tasks, recreational activities, and community mobility as compared to previous level of function.     Clinical Decision Making (Complexity):  Assessment of Occupational Performance: 1-3 Performance Deficits  Occupational Performance Limitations: functional mobility and care of others  Clinical Decision Making (Complexity): Low complexity    PLAN OF CARE  Treatment Interventions:  Interventions: Gradient Compression Bandaging, Manual Therapy    Long Term Goals   OT Goal 1  Goal Identifier: 1  Goal Description: In order to improve functional mobility for activities of living, by the completion of intensive treatment, patient and/or caregiver will;    Verbalize and/or demonstrate understanding of techniques to independently manage their lymphedema at home  Rationale: In order to maximize safety and independence with performance of self-care activities  OT Goal 2  Goal Identifier: 1a  Goal Description: tolerate gradient compression bandaging/wearing compression garments 23 hrs/day to decrease volume of extracellular fluid  Rationale: In order to maximize safety and independence with performance of self-care activities  OT Goal 3  Goal Identifier: 1b  Goal Description: demonstrate independence in applying gradient compression bandages  Rationale: In order to maximize safety and independence with performance of self-care activities  OT Goal 4  Goal Identifier: 1c  Goal Description: demonstrate independence in performing prescribed exercises, self MLD to facilate the lymph system  Rationale: In order to maximize safety and independence with performance of self-care activities  OT Goal 5  Goal Identifier: 1d  Goal Description: be independent in donning/doffing, wearing schedule, and care of compression garments  Rationale: In order to maximize safety and independence with performance of self-care activities      Frequency of Treatment:    Duration of Treatment:         Education Assessment: Learner/Method: Patient     Risks and benefits of evaluation/treatment have been explained.   Patient/Family/caregiver agrees with Plan of Care.     Evaluation Time:        Signing Clinician: Diane Mike

## 2024-06-18 ENCOUNTER — THERAPY VISIT (OUTPATIENT)
Dept: OCCUPATIONAL THERAPY | Facility: CLINIC | Age: 64
End: 2024-06-18
Payer: COMMERCIAL

## 2024-06-18 DIAGNOSIS — I89.0 LYMPHEDEMA: Primary | ICD-10-CM

## 2024-06-18 PROCEDURE — 97140 MANUAL THERAPY 1/> REGIONS: CPT | Mod: GO | Performed by: OCCUPATIONAL THERAPIST

## 2024-06-20 ENCOUNTER — HOSPITAL ENCOUNTER (OUTPATIENT)
Dept: NUCLEAR MEDICINE | Facility: CLINIC | Age: 64
Setting detail: NUCLEAR MEDICINE
Discharge: HOME | End: 2024-06-20
Attending: STUDENT IN AN ORGANIZED HEALTH CARE EDUCATION/TRAINING PROGRAM
Payer: COMMERCIAL

## 2024-06-20 ENCOUNTER — HOSPITAL ENCOUNTER (OUTPATIENT)
Dept: CT IMAGING | Facility: CLINIC | Age: 64
Discharge: HOME | End: 2024-06-20
Attending: STUDENT IN AN ORGANIZED HEALTH CARE EDUCATION/TRAINING PROGRAM
Payer: COMMERCIAL

## 2024-06-20 ENCOUNTER — HOSPITAL ENCOUNTER (OUTPATIENT)
Dept: CT IMAGING | Facility: CLINIC | Age: 64
Discharge: HOME | End: 2024-06-20
Attending: OTOLARYNGOLOGY
Payer: COMMERCIAL

## 2024-06-20 DIAGNOSIS — Z76.89 PREVENTION OF CHEMOTHERAPY-INDUCED NEUTROPENIA: ICD-10-CM

## 2024-06-20 DIAGNOSIS — K11.8 PAROTID MASS: ICD-10-CM

## 2024-06-20 DIAGNOSIS — C61 MALIGNANT NEOPLASM OF PROSTATE METASTATIC TO BONE (H): ICD-10-CM

## 2024-06-20 DIAGNOSIS — C61 HORMONE RESISTANT PROSTATE CANCER (H): ICD-10-CM

## 2024-06-20 DIAGNOSIS — C79.51 MALIGNANT NEOPLASM OF PROSTATE METASTATIC TO BONE (H): ICD-10-CM

## 2024-06-20 DIAGNOSIS — Z19.2 HORMONE RESISTANT PROSTATE CANCER (H): ICD-10-CM

## 2024-06-20 PROCEDURE — 70491 CT SOFT TISSUE NECK W/DYE: CPT | Mod: 26 | Performed by: STUDENT IN AN ORGANIZED HEALTH CARE EDUCATION/TRAINING PROGRAM

## 2024-06-20 PROCEDURE — 71260 CT THORAX DX C+: CPT | Mod: 26 | Performed by: STUDENT IN AN ORGANIZED HEALTH CARE EDUCATION/TRAINING PROGRAM

## 2024-06-20 PROCEDURE — A9503 TC99M MEDRONATE: HCPCS | Performed by: STUDENT IN AN ORGANIZED HEALTH CARE EDUCATION/TRAINING PROGRAM

## 2024-06-20 PROCEDURE — 70491 CT SOFT TISSUE NECK W/DYE: CPT

## 2024-06-20 PROCEDURE — 78306 BONE IMAGING WHOLE BODY: CPT | Mod: 26 | Performed by: RADIOLOGY

## 2024-06-20 PROCEDURE — 78306 BONE IMAGING WHOLE BODY: CPT

## 2024-06-20 PROCEDURE — 71260 CT THORAX DX C+: CPT

## 2024-06-20 PROCEDURE — 74177 CT ABD & PELVIS W/CONTRAST: CPT | Mod: 26 | Performed by: STUDENT IN AN ORGANIZED HEALTH CARE EDUCATION/TRAINING PROGRAM

## 2024-06-20 PROCEDURE — 343N000001 HC RX 343: Performed by: STUDENT IN AN ORGANIZED HEALTH CARE EDUCATION/TRAINING PROGRAM

## 2024-06-20 PROCEDURE — 250N000011 HC RX IP 250 OP 636: Performed by: STUDENT IN AN ORGANIZED HEALTH CARE EDUCATION/TRAINING PROGRAM

## 2024-06-20 RX ORDER — IOPAMIDOL 755 MG/ML
135 INJECTION, SOLUTION INTRAVASCULAR ONCE
Status: COMPLETED | OUTPATIENT
Start: 2024-06-20 | End: 2024-06-20

## 2024-06-20 RX ORDER — TC 99M MEDRONATE 20 MG/10ML
20-30 INJECTION, POWDER, LYOPHILIZED, FOR SOLUTION INTRAVENOUS ONCE
Status: COMPLETED | OUTPATIENT
Start: 2024-06-20 | End: 2024-06-20

## 2024-06-20 RX ADMIN — IOPAMIDOL 135 ML: 755 INJECTION, SOLUTION INTRAVENOUS at 07:33

## 2024-06-20 RX ADMIN — TC 99M MEDRONATE 26.4 MILLICURIE: 20 INJECTION, POWDER, LYOPHILIZED, FOR SOLUTION INTRAVENOUS at 07:22

## 2024-06-23 NOTE — PROGRESS NOTES
"    [facility] Oncology Followup  Jun 24, 2024    REASON FOR VISIT: Follow-up for metastatic castration-resistant prostate cancer.      INTERVAL HISTORY:   Feels good today.  Notes he has 1 bad week, 1 OK week and 1 good week with each 3 week cycle.  Appetite is OK.  Not getting acupuncture yet due to lack of prior auth from acupuncture team.  He does think it was helpful.  Notes it is mainly in his feet.  Nausea is controlled.  No falls or tripping due to neuropathy.  LLE edema is much better with compression stocking, pending custom compression with lymphedema team.  He has no fevers, chills or cough.  Night sweats/hot flashes are unchanged.  No shortness of breath.  He did have covid about 2 weeks ago, but did not have significant symptoms.      Review of Systems:  Remainder of 12 point ROS reviewed and otherwise negative except as in interval history.     ONCOLOGY HISTORY: Mr. 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 His oncologic history is detailed below.     1. 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 Naylor at [facility]. Prior PSA were 1.4 on 8/16/17, 2.4 on 6/28/16, 2.9 on 6/28/16, and as low as 0.4 on 3/26/2003.   - 1/08/2019: Consultation with Sarah Wilson CNP in Urology clinic. Repeat PSA 9.6.  - 1/16/2019: MRI prostate with contrast - \"This examination is characterized as PIRADS 5- very high probability. Clinically significant cancer is highly likely to be present. There is a large, invasive mass arising from the right peripheral zone and extending into the neurovascular bundle, seminal vesicle, and " "along the anterolateral right mesorectal fascia. Metastatic right external iliac lymph node. Metastatic lesion in the posterior/superior right acetabulum.\"  - 1/25/2019: CT abdomen and pelvis with IV contrast - \"1. Heterogeneous enhancing mass posteriorly in the upper pole of the right kidney measures 4.5 x 5.8 x 5.7 cm (AP by transverse by craniocaudal). It has a small nodular component extending posterior medially which abuts the right psoas muscle. This nodular extension measures 2.1 x 2.1 cm. Minimal stranding about the mass. No definite thrombus within the right renal vein. This renal mass is compatible with a renal cell carcinoma. A paraaortic lymph node situated immediately posterior to the left renal vein measures 1.1 cm in short axis, suspicious for metastasis. 2. A 2 cm right external iliac lymph node is also suspicious for metastases. 3. Multiple sclerotic osseous lesions suspicious for metastases. These include 0.8 and 0.6 cm sclerotic lesions laterally in the right iliac wing (images 53 and 62 respectively). Ill-defined groundglass density laterally in the right acetabulum measuring 1.7 x 1.2 cm corresponds with the lesion identified on MRI. A 0.4 cm groundglass density in the left acetabulum. Sclerotic lesion in the left femoral neck measures 0.9 x 1.6 cm (image 81). Sclerotic metastases would be more compatible with prostate metastases. 4. A 3 subcentimeter hepatic lesions are indeterminate. Metastases would be a consideration. These can be further characterized with liver MRI.\"  - 1/25/2019: NM bone scan - \"There is focal bony uptake in the left femoral neck, right acetabulum, right of midline at the S1 or L5 level of the spine, within multiple bilateral ribs, in the C7 or T1 level of the spine, and anteriorly within the skull. Findings are suspicious for metastases.\"  - 1/31/19: CT chest with contrast - \" No suspicious nodules in the chest.  Stable appearance of a 5.8 cm right renal mass concerning " "for renal carcinoma until proven otherwise.  Stable indeterminant subcentimeter hypodensities in the liver.  Multifocal osteoblastic metastasis including 2.5 cm lesion in the right fifth rib posteriorly and a 1.6 cm lesion in the right third rib posteriorly. No lytic lesions identified.\"  - 2/6/19: CT-guided right sclerotic fifth rib lesion biopsy - \"Metastatic carcinoma, consistent with prostate primary.  Immunohistochemical stains performed show the metastatic carcinoma stains positive for NKX3.1 (prostate marker), negative for STACIE 3 and PAX8, which supports the above diagnosis.\"  - 2/15/19: Started Casodex 50mg every day and consented for the biobanking protocol. 3/18/19 - stopped Casodex.  - 2/19/19: Case discussed in tumor board - recommendation for nephectomy for suspected malignant right renal mass.   - 2/26/19: Started Lupron 22.5mg every 3 months.   - 5/8/19: Started Docetaxel 75mg/m2 IV every 3 weeks. 06/18/19 - cycle 3, 7/10/19 - cycle 4, 07/31/19 - cycle 5, 08/21/19 - cycle 6.   - 10/2/19: Restaging CT CAP and NM Bone Scan showed improved osseous disease, no evidence of recurrent or new metastatic disease.  - 1/5/20: Restaging CT CAP and NM Bone Scan showed stable osseous disease, no evidence of recurrent or new metastatic disease.  - 4/6/20: NM bone scan with slightly improved uptake in known skeletal mets. CT C/A/P with contrast with stable osseous mets, no yusuf enlargement, no new visceral mets etc.  - 04/08/20: Zometa every 3 months.  - 10/5/20: CT C/A/P with contrast and NM bone scan - SD.   - 1/4/21: CT C/A/P with contrast and NM bone scan - SD but slight increase in right 5th rib tracer uptake and in posterior L5 sclerosis. 1/6/21  PSA = 0.29  - 03/29/21: CT C/A/P with contrast - single new right sacral 8mm bone lesion (suspicious), other bone mets stable. No visceral/yusuf disease. Nephrectomy site without recurrence. NM bone scan - SD but \"increased uptake associated with the prior lesions of " "the lower cervical spine, posterior right fourth rib and right L5 posterior arch elements. Otherwise unchanged uptake associated with the proximal left femur and left anterior fourth rib. Similar uptake of the left halux, possibly secondary to degenerative osteoarthritis or gout.\"    3/31/21 PSA = 0.44  7/7/21  PSA = 0.47  11/2021 PSA = 1.05  -- Start XTANDI  12/16/21 PSA =0.22  2/11/22 PSA= 0.50  3/22/22 PSA=0.72  5/5/2022 PSA=1.79  7/11/2022 PSA=2.16 --Start cycle 1 docetaxel   8/22/22 PSA = 1.36  9/12/22 PSA = 1.09  10/24/22 Cycle #6 of Docetaxel. End of treatment  1/9/23  PSA =0.66  3/20/23  PSA=1.54  4/21/23 PSA = 1.81  T=15  5/22/23 C1 Pluvicto   06/07/23          PSA = 1.42  7/18/23 PSA=1.75, C2 Pluvicto   8/28/23 Transfer of care initial visit for Tabby.  PSA 2.06.  C3 Pluvicto scheduled for 8/30.  Proceed with dose as planned.  MR L femur, ortho referral, PSMA PET ordered for prior to follow-up.    10/2/23 Palliative RT to L femur metastasis complete.  PSMA PET with mixed response.  PSA 2.21.  RT to L femur pending.  Continue with Dose #4 of Pluvicto despite mixed response to therapy.  Dex course for possible pain flare with RT.    11/8/23  Follow-up prior to Dose #5 Pluvicto.  More pain in chest/ribs/back. PSA 4.38.  Testosterone=3.  Rad onc referral for ribs.  Biopsy progressive lesion for progression on Pluvicto.  Cabazitaxel scheduled for follow-up appointment.  Tempus blood testing unrevealing for targetable mutation.    11/27/23 Bx completed from R iliac crest but unrevealing for either PCa or RCC.  Start cabazitaxel C1D1.  PSA 2.81.    12/19/23 PSA = 3.66  1/8/24 PSA = 3.55  1/29/24 PSA = 3.07  2/19/24 PSA= 2.21-Start carboplatin/cabazitaxel.    3/11/24 PSA= C2 Carbo/cabazi.  PSA 1.99.    4/01/24 PSA= 1.74. C3 Carbo/cabazi.   4/22/24 PSA= 1.85. C4 Carbo/cabazi.   5/13/24 PSA = 2.06. C5 Carbo/cabazi.   6/3/24 PSA = 2.68, cycle 6 Carboplatin/cabazitaxel  6/24/24 PSA 3.51, C7 carboplatin/cabazitaxel.  " "Bony lesions stable on NM bone scan.  Reticulonodular densities noted in lungs as possible infection vs drug reaction.  Add chromogranin A to next labs.     Radiation history:   -C7         3,000 cGy       06 X      8/05/2021        8/18/2021        13       10  -2 Sacrum          2,500 cGy       18 X      4/12/2022        4/18/2022         6           -Sacrum retreat               700 cGy        18 X      2/28/2023        2/28/2023  -Left femur to 2000 cGy in 5 fractions, completed 10/10/2023   -bilateral posterior chest wall at an area of osseous involvement of the ribs and adjacent T4 and 5 spine, to be delivered to 2000 cGy in 5 fractions.  completed 12/13-12/19/23.    2. Stage III ([id number]), grade 3 of 4, clear-cell RCC of right kidney:  - Incidentally diagnosed as above.   - Underwent curative-intent robotic right radical nephrectomy with Dr. Wesley Cleveland on 3/19/19. No tumor spillage per op report.   - Path showed: \"Histologic Type: Clear cell renal cell carcinoma; Sarcomatoid Features: Not identified; Histologic Grade: Nucleolar grade 3 (WHO/ISUP). Extent Tumor Size: 4.3 cm. Microscopic Tumor Extension: Tumor extension into renal sinus (in vascular structures). Margins: Negative. Tumor Necrosis: Present; focal. Lymph-Vascular Invasion: Not identified.  Pathologic Staging (pTNM) Primary Tumor (pT): pT3a: Tumor extends into renal vein branches/renal sinus. Regional Lymph Nodes (pN): pNX. Number of Lymph Nodes Examined: 0 Distant Metastasis (pM): pM N/A.\"  - Restaging scans as above.  No current concerns for recurrence.      PAST MEDICAL HISTORY:  Past Medical History:   Diagnosis Date    Cancer of kidney, right (H)     Complication of anesthesia     slow wakeup     Malignant neoplasm of prostate metastatic to bone (H) 2/14/2019    Thyroid disease      CURRENT MEDICATIONS:   Current Outpatient Medications:     atorvastatin (LIPITOR) 20 MG tablet, Take 60 mg by mouth daily , Disp: , Rfl:     BERBERINE " CHLORIDE PO, , Disp: , Rfl:     calcium citrate-vitamin D (CITRACAL) 315-250 MG-UNIT TABS per tablet, Take 500 mg by mouth 2 times daily With 800 Vd per pt, Disp: , Rfl:     cycloSPORINE (RESTASIS) 0.05 % ophthalmic emulsion, Place 1 drop into both eyes 2 times daily , Disp: , Rfl:     dexAMETHasone (DECADRON) 4 MG tablet, Take 2 tablets (8 mg) by mouth daily Take for 3 days, starting the day after chemo. Take with food., Disp: 6 tablet, Rfl: 2    dexAMETHasone (DECADRON) 4 MG tablet, Take 1 tablet (4 mg) by mouth daily, Disp: 7 tablet, Rfl: 2    fentaNYL (DURAGESIC) 25 mcg/hr 72 hr patch, Place 1 patch onto the skin every 72 hours remove old patch., Disp: 10 patch, Rfl: 0    furosemide (LASIX) 20 MG tablet, Take 1 tablet (20 mg) by mouth daily, Disp: 15 tablet, Rfl: 1    gabapentin (NEURONTIN) 300 MG capsule, Take 1 capsule (300 mg) by mouth every morning AND 2 capsules (600 mg) daily at 2 pm AND 2 capsules (600 mg) at bedtime., Disp: 150 capsule, Rfl: 3    levothyroxine (SYNTHROID/LEVOTHROID) 112 MCG tablet, Take 112 mcg by mouth daily, Disp: , Rfl:     lidocaine, viscous, (XYLOCAINE) 2 % solution, Swish and swallow 15 mLs in mouth every 3 hours as needed for pain (before meals and at bedtime) ; Max 8 doses/24 hour period., Disp: 100 mL, Rfl: 1    loratadine (CLARITIN) 10 MG tablet, Take 10 mg by mouth daily, Disp: , Rfl:     Multiple Vitamins-Minerals (MULTIVITAMIN ADULT EXTRA C PO), Take 1 tablet by mouth every 24 hours, Disp: , Rfl:     naloxone (NARCAN) 4 MG/0.1ML nasal spray, Spray 1 spray (4 mg) into one nostril alternating nostrils as needed for opioid reversal every 2-3 minutes until assistance arrives, Disp: 0.2 mL, Rfl: 1    Nutritional Supplements (SALMON OIL) CAPS, Take 2 capsules by mouth daily , Disp: , Rfl:     omeprazole (PRILOSEC) 20 MG DR capsule, Take 20 mg by mouth daily, Disp: , Rfl:     ondansetron (ZOFRAN) 8 MG tablet, Take 1 tablet (8 mg) by mouth every 8 hours as needed for nausea  (vomiting), Disp: 30 tablet, Rfl: 2    ondansetron (ZOFRAN) 8 MG tablet, Take 1 tablet (8 mg) by mouth every 8 hours as needed for nausea, Disp: 30 tablet, Rfl: 4    oxyCODONE (ROXICODONE) 5 MG tablet, Take 1-2 tablets (5-10 mg) by mouth every 3 hours as needed for severe pain, Disp: 90 tablet, Rfl: 0    predniSONE (DELTASONE) 5 MG tablet, Take 1 tablet (5 mg) by mouth 2 times daily (with meals) for 21 days, Disp: 42 tablet, Rfl: 0    prochlorperazine (COMPAZINE) 10 MG tablet, Take 1 tablet (10 mg) by mouth every 6 hours as needed for nausea or vomiting, Disp: 30 tablet, Rfl: 2    tamsulosin (FLOMAX) 0.4 MG capsule, Take 1 capsule (0.4 mg) by mouth daily, Disp: 30 capsule, Rfl: 3    triamterene-HCTZ (MAXZIDE-25) 37.5-25 MG tablet, Take 1 tablet by mouth daily, Disp: , Rfl:       Physical Exam:   There were no vitals taken for this visit.  Wt Readings from Last 10 Encounters:   06/03/24 116 kg (255 lb 12.8 oz)   05/13/24 117.9 kg (259 lb 14.4 oz)   04/22/24 118.3 kg (260 lb 14.4 oz)   04/01/24 119.4 kg (263 lb 3.2 oz)   03/28/24 116.1 kg (256 lb)   03/11/24 119.3 kg (263 lb 1.6 oz)   02/19/24 121.2 kg (267 lb 4.8 oz)   02/13/24 121.4 kg (267 lb 11.2 oz)   01/29/24 122.7 kg (270 lb 6.4 oz)   01/24/24 117.9 kg (260 lb)   General: The patient is a pleasant male in no acute distress.  HEENT: EOMI. Sclerae are anicteric. Mucous membranes moist without lesions or thrush.   Lymph: Neck is supple with no lymphadenopathy in the cervical or supraclavicular areas.   Heart: Regular rate and rhythm.   Lungs: normal work of breathing on RA.    Abdomen: Bowel sounds present, soft, non-distended.    Extremities:trace pitting lower extremity edema in the left leg into the thigh, trace right sided edema.   Neuro: Cranial nerves II through XII are grossly intact.  Skin: No rashes, petechiae, or bruising noted on exposed skin.  Psych: affect bright, alert and oriented    LABORATORY DATA:  Most Recent 3 CBC's:  Recent Labs   Lab Test  "06/03/24  0752 05/13/24  0916 04/22/24  0909   WBC 4.1 4.7 4.4   HGB 10.0* 9.7* 9.9*   * 102* 99   * 155 166   ANEUTAUTO 2.8 3.3 3.0     Most Recent 3 BMP's:  Recent Labs   Lab Test 06/03/24  0752 05/22/24  0915 05/13/24  0916 04/22/24  0909    140 142 143   POTASSIUM 3.9 4.1 3.9 4.0   CHLORIDE 106 104 109* 108*   CO2 24 23 23 25   BUN 14.6 12.4 15.3 14.3   CR 1.00 0.89 0.95 0.92   ANIONGAP 10 13 10 10   BETHANY 8.5* 8.5* 8.6* 8.6*   * 97 111* 92   PROTTOTAL 6.2*  --  6.1* 6.0*   ALBUMIN 3.9  --  4.0 4.0    Most Recent 3 LFT's:  Recent Labs   Lab Test 06/03/24 0752 05/13/24  0916 04/22/24  0909   AST 22 21 19   ALT 18 19 22   ALKPHOS 73 76 74   BILITOTAL 0.3 0.3 0.4      Latest Reference Range & Units 02/19/24 11:03 03/11/24 08:49 04/01/24 09:30 04/22/24 09:09 05/13/24 09:16   PSA Tumor Marker 0.00 - 4.50 ng/mL 2.21 1.99 1.74 1.85 2.06        CT CAP 6/20/24: Reviewed.  No evidence for new adenopathy, lung findings drug or infection related.      NM bone scan from 6/20/24 overall stable.      ASSESSMENT & PLAN: Mr. Reyes is a 64 year old gentleman with metastatic castration sensitive prostate adenocarcinoma as well as an incidentally diagnosed stage III clear-cell renal cell carcinoma of the right kidney (s/p radical R nephrectomy 3/19/19), who is here prior to cycle 3 cabazitaxel.      1. Metastatic castration-resistant prostate cancer, with involvement of the bones and RPLN:   - Patient met the criteria for CHAARTED \"high-volume\" metastatic hormone-sensitive prostate cancer. He opted for docetaxel in combination with ADT (per JOSEFA, [id number], KARLA). He was subsequently treated with docetaxel x6 doses in the [facility] setting and enzalutamide.  He initiated Pluvicto in May 2023 with an initial slight decline in PSA, but this began to rise just prior to Cycle 3.  Given his XR evidence of progression in L femur and PSA rise, we re-evaluated his progression with PSMA PET scan in " September 2023, with note of mixed response.  Canceled Pluvicto Dose for November due to symptomatic progression with PSA rise. PSA is labile. Biopsy results from 11/21 was unrevealing for evaluation of NEPC vs small cell given progression without a significant rise in PSA. Tempus peripheral blood mutation analysis is unrevealing for targetable mutations. Cabazitaxel started 11/27/23 given progression on Pluvicto. Carboplatin added 2/2024 and cabazitaxel dose reduced by 20% in April 2024 due to neuropathy.  Restaging with pulmonary consolidative findings without mass-like features.  Will add chromogranin A to next labs and infectious workup.  He was otherwise asymptomatic from an infection standpoint.    -Lupron next due 7/2024.   -Cycle 7 to remain at 20% reduced     2. Pressure in chest, resolved:  Has an ongoing sensation of pressure/need to cough in his chest, primarily against his sternum. Vital signs stable, negative pulmonary physical exam. No chest pain.     3. Bone metastases:    - Noted to have osseous metastatic disease at the time of diagnosis. S/p radiation to C spine and sacrum (Re-irradiation to sacrum).   - pain mgmt per palliative care, pain is now well controlled with fentanyl patch. Dexamethasone burst is helpful for pain flares and he has these available as needed.    - RT to L femur 10/2023.  RT for bilateral posterior ribs 12/2023 with Dr. Albert.  - Encouraged to continue calcium and vitamin D supplementation; continue Zometa 4mg IV every ~3 months.   - Last Zometa given 5/20/24.  Next due August 2024.       4. Stage 3, UISS-high risk ccRCC: s/p R nephrectomy   - Nephrectomy 3/19/19 and noted incidentally.  He is now 4.5 years post-op without evidence for recurrence.   - At this point, there is a minimal risk of recurrence of his RCC given the time since surgery without the use of adjuvant immunotherapy. There is no suspicious adenopathy or other features on his most recent imaging studies.     - Will continue to monitor with imaging planned for prostate cancer.      5. Sensory neuropathy, Toes/feet Grade 2  - continue monitor for worsening with cabazitaxel.   - Continue acupuncture once weekly. Patient working with insurance to try to increase to twice a week.   - He is on gabapentin managed by palliative care 300 mg in the am, 300 mg in the afternoon, and 600 mg at HS. Discussed with Walter increasing his afternoon dose of gabapentin from 300 mg to 600 mg. He will try this and he will update palliative care as well.      6. Anemia  - likely secondary to chemotherapy. Iron levels adequate on 4/22/24. MCV elevated.  Folate and B12 adequate.  Retic appropriately elevated.      7. Left lower extremity swelling   - US 4/22/24 negative for DVT. CT abdomen pelvis 4/29 without significant or new lymphadenopathy. Continue compression stocking. Following with lymphedema therapy, has a lymphatic massage scheduled next week.      PLAN:   1. Proceed with C7 of carboplatin/cabazitaxel.  20% dose reduction of cabazi.  Consider dose reduction of carbo next if neuropathy is worse.   2. Procalcitonin added to existing 6/24 labs for initial infection evaluation.    3. Return in 3 weeks for C8.    4. Restaging in 3 months with CT CAP and NM bone scan.  Sooner if new pain or symptoms.    5. Refills for prednisone/compazine sent    The longitudinal plan of care for metastatic hormone resistant prostate cancer was addressed during this visit. Due to the added complexity in care, I will continue to support Walter Reyes in the subsequent management of this condition(s) and with the ongoing continuity of care of this condition(s).     A total of 40 minutes spent on the date of the encounter doing chart review, review of test results, interpretation of tests, patient visit, and documentation.  The patient was given the opportunity to ask multiple questions today, all of which were answered to their satisfaction.      Omar Mcnair MD, PhD   of Medicine   Oncology/BMT/Cellular Therapies

## 2024-06-24 ENCOUNTER — INFUSION THERAPY VISIT (OUTPATIENT)
Dept: ONCOLOGY | Facility: CLINIC | Age: 64
End: 2024-06-24
Payer: COMMERCIAL

## 2024-06-24 ENCOUNTER — APPOINTMENT (OUTPATIENT)
Dept: LAB | Facility: CLINIC | Age: 64
End: 2024-06-24
Payer: COMMERCIAL

## 2024-06-24 ENCOUNTER — ONCOLOGY VISIT (OUTPATIENT)
Dept: ONCOLOGY | Facility: CLINIC | Age: 64
End: 2024-06-24
Payer: COMMERCIAL

## 2024-06-24 VITALS
HEART RATE: 67 BPM | TEMPERATURE: 98.6 F | OXYGEN SATURATION: 96 % | BODY MASS INDEX: 34.63 KG/M2 | WEIGHT: 252.9 LBS | DIASTOLIC BLOOD PRESSURE: 56 MMHG | SYSTOLIC BLOOD PRESSURE: 123 MMHG | RESPIRATION RATE: 16 BRPM

## 2024-06-24 DIAGNOSIS — Z19.2 HORMONE RESISTANT PROSTATE CANCER (H): ICD-10-CM

## 2024-06-24 DIAGNOSIS — C79.51 MALIGNANT NEOPLASM OF PROSTATE METASTATIC TO BONE (H): Primary | ICD-10-CM

## 2024-06-24 DIAGNOSIS — Z76.89 PREVENTION OF CHEMOTHERAPY-INDUCED NEUTROPENIA: ICD-10-CM

## 2024-06-24 DIAGNOSIS — R91.8 PULMONARY NODULES: ICD-10-CM

## 2024-06-24 DIAGNOSIS — C61 MALIGNANT NEOPLASM OF PROSTATE METASTATIC TO BONE (H): Primary | ICD-10-CM

## 2024-06-24 DIAGNOSIS — T45.1X5A ANTINEOPLASTIC CHEMOTHERAPY INDUCED ANEMIA: ICD-10-CM

## 2024-06-24 DIAGNOSIS — D64.81 ANTINEOPLASTIC CHEMOTHERAPY INDUCED ANEMIA: ICD-10-CM

## 2024-06-24 DIAGNOSIS — C61 HORMONE RESISTANT PROSTATE CANCER (H): ICD-10-CM

## 2024-06-24 LAB
ALBUMIN SERPL BCG-MCNC: 4 G/DL (ref 3.5–5.2)
ALP SERPL-CCNC: 80 U/L (ref 40–150)
ALT SERPL W P-5'-P-CCNC: 16 U/L (ref 0–70)
ANION GAP SERPL CALCULATED.3IONS-SCNC: 10 MMOL/L (ref 7–15)
AST SERPL W P-5'-P-CCNC: 21 U/L (ref 0–45)
BASOPHILS # BLD AUTO: 0 10E3/UL (ref 0–0.2)
BASOPHILS NFR BLD AUTO: 0 %
BILIRUB SERPL-MCNC: 0.3 MG/DL
BUN SERPL-MCNC: 16 MG/DL (ref 8–23)
CALCIUM SERPL-MCNC: 8.7 MG/DL (ref 8.8–10.2)
CHLORIDE SERPL-SCNC: 108 MMOL/L (ref 98–107)
CREAT SERPL-MCNC: 1.01 MG/DL (ref 0.67–1.17)
DEPRECATED HCO3 PLAS-SCNC: 23 MMOL/L (ref 22–29)
EGFRCR SERPLBLD CKD-EPI 2021: 84 ML/MIN/1.73M2
EOSINOPHIL # BLD AUTO: 0 10E3/UL (ref 0–0.7)
EOSINOPHIL NFR BLD AUTO: 0 %
ERYTHROCYTE [DISTWIDTH] IN BLOOD BY AUTOMATED COUNT: 17.3 % (ref 10–15)
GLUCOSE SERPL-MCNC: 108 MG/DL (ref 70–99)
HCT VFR BLD AUTO: 31.8 % (ref 40–53)
HGB BLD-MCNC: 10 G/DL (ref 13.3–17.7)
IMM GRANULOCYTES # BLD: 0 10E3/UL
IMM GRANULOCYTES NFR BLD: 1 %
LYMPHOCYTES # BLD AUTO: 0.6 10E3/UL (ref 0.8–5.3)
LYMPHOCYTES NFR BLD AUTO: 15 %
MCH RBC QN AUTO: 33.4 PG (ref 26.5–33)
MCHC RBC AUTO-ENTMCNC: 31.4 G/DL (ref 31.5–36.5)
MCV RBC AUTO: 106 FL (ref 78–100)
MONOCYTES # BLD AUTO: 0.4 10E3/UL (ref 0–1.3)
MONOCYTES NFR BLD AUTO: 10 %
NEUTROPHILS # BLD AUTO: 2.9 10E3/UL (ref 1.6–8.3)
NEUTROPHILS NFR BLD AUTO: 74 %
NRBC # BLD AUTO: 0 10E3/UL
NRBC BLD AUTO-RTO: 0 /100
PLATELET # BLD AUTO: 157 10E3/UL (ref 150–450)
POTASSIUM SERPL-SCNC: 4.5 MMOL/L (ref 3.4–5.3)
PROCALCITONIN SERPL IA-MCNC: 0.07 NG/ML
PROT SERPL-MCNC: 6.3 G/DL (ref 6.4–8.3)
PSA SERPL DL<=0.01 NG/ML-MCNC: 3.51 NG/ML (ref 0–4.5)
RBC # BLD AUTO: 2.99 10E6/UL (ref 4.4–5.9)
SODIUM SERPL-SCNC: 141 MMOL/L (ref 135–145)
WBC # BLD AUTO: 3.9 10E3/UL (ref 4–11)

## 2024-06-24 PROCEDURE — G2211 COMPLEX E/M VISIT ADD ON: HCPCS | Performed by: STUDENT IN AN ORGANIZED HEALTH CARE EDUCATION/TRAINING PROGRAM

## 2024-06-24 PROCEDURE — 96413 CHEMO IV INFUSION 1 HR: CPT

## 2024-06-24 PROCEDURE — 258N000003 HC RX IP 258 OP 636: Mod: JZ | Performed by: STUDENT IN AN ORGANIZED HEALTH CARE EDUCATION/TRAINING PROGRAM

## 2024-06-24 PROCEDURE — 84153 ASSAY OF PSA TOTAL: CPT | Performed by: STUDENT IN AN ORGANIZED HEALTH CARE EDUCATION/TRAINING PROGRAM

## 2024-06-24 PROCEDURE — 36591 DRAW BLOOD OFF VENOUS DEVICE: CPT | Performed by: STUDENT IN AN ORGANIZED HEALTH CARE EDUCATION/TRAINING PROGRAM

## 2024-06-24 PROCEDURE — 96377 APPLICATON ON-BODY INJECTOR: CPT | Mod: 59

## 2024-06-24 PROCEDURE — 99213 OFFICE O/P EST LOW 20 MIN: CPT | Performed by: STUDENT IN AN ORGANIZED HEALTH CARE EDUCATION/TRAINING PROGRAM

## 2024-06-24 PROCEDURE — 96375 TX/PRO/DX INJ NEW DRUG ADDON: CPT

## 2024-06-24 PROCEDURE — 96367 TX/PROPH/DG ADDL SEQ IV INF: CPT

## 2024-06-24 PROCEDURE — 84403 ASSAY OF TOTAL TESTOSTERONE: CPT | Performed by: STUDENT IN AN ORGANIZED HEALTH CARE EDUCATION/TRAINING PROGRAM

## 2024-06-24 PROCEDURE — 85025 COMPLETE CBC W/AUTO DIFF WBC: CPT | Performed by: STUDENT IN AN ORGANIZED HEALTH CARE EDUCATION/TRAINING PROGRAM

## 2024-06-24 PROCEDURE — 99215 OFFICE O/P EST HI 40 MIN: CPT | Performed by: STUDENT IN AN ORGANIZED HEALTH CARE EDUCATION/TRAINING PROGRAM

## 2024-06-24 PROCEDURE — 250N000011 HC RX IP 250 OP 636: Mod: JZ | Performed by: STUDENT IN AN ORGANIZED HEALTH CARE EDUCATION/TRAINING PROGRAM

## 2024-06-24 PROCEDURE — 96417 CHEMO IV INFUS EACH ADDL SEQ: CPT

## 2024-06-24 PROCEDURE — 96372 THER/PROPH/DIAG INJ SC/IM: CPT | Performed by: STUDENT IN AN ORGANIZED HEALTH CARE EDUCATION/TRAINING PROGRAM

## 2024-06-24 PROCEDURE — 82565 ASSAY OF CREATININE: CPT | Performed by: STUDENT IN AN ORGANIZED HEALTH CARE EDUCATION/TRAINING PROGRAM

## 2024-06-24 PROCEDURE — 84145 PROCALCITONIN (PCT): CPT

## 2024-06-24 RX ORDER — HEPARIN SODIUM (PORCINE) LOCK FLUSH IV SOLN 100 UNIT/ML 100 UNIT/ML
5 SOLUTION INTRAVENOUS DAILY PRN
Status: DISCONTINUED | OUTPATIENT
Start: 2024-06-24 | End: 2024-06-24 | Stop reason: HOSPADM

## 2024-06-24 RX ORDER — HEPARIN SODIUM (PORCINE) LOCK FLUSH IV SOLN 100 UNIT/ML 100 UNIT/ML
5 SOLUTION INTRAVENOUS
Status: DISCONTINUED | OUTPATIENT
Start: 2024-06-24 | End: 2024-06-24 | Stop reason: HOSPADM

## 2024-06-24 RX ORDER — PALONOSETRON 0.05 MG/ML
0.25 INJECTION, SOLUTION INTRAVENOUS ONCE
Status: CANCELLED | OUTPATIENT
Start: 2024-06-24

## 2024-06-24 RX ORDER — MEPERIDINE HYDROCHLORIDE 25 MG/ML
25 INJECTION INTRAMUSCULAR; INTRAVENOUS; SUBCUTANEOUS EVERY 30 MIN PRN
Status: CANCELLED | OUTPATIENT
Start: 2024-06-24

## 2024-06-24 RX ORDER — ALBUTEROL SULFATE 90 UG/1
1-2 AEROSOL, METERED RESPIRATORY (INHALATION)
Status: CANCELLED
Start: 2024-06-24

## 2024-06-24 RX ORDER — PROCHLORPERAZINE MALEATE 10 MG
10 TABLET ORAL EVERY 6 HOURS PRN
Qty: 30 TABLET | Refills: 5 | Status: SHIPPED | OUTPATIENT
Start: 2024-06-24

## 2024-06-24 RX ORDER — HEPARIN SODIUM (PORCINE) LOCK FLUSH IV SOLN 100 UNIT/ML 100 UNIT/ML
5 SOLUTION INTRAVENOUS
Status: CANCELLED | OUTPATIENT
Start: 2024-06-24

## 2024-06-24 RX ORDER — LORAZEPAM 2 MG/ML
0.5 INJECTION INTRAMUSCULAR EVERY 4 HOURS PRN
Status: CANCELLED | OUTPATIENT
Start: 2024-06-24

## 2024-06-24 RX ORDER — PREDNISONE 5 MG/1
5 TABLET ORAL 2 TIMES DAILY WITH MEALS
Qty: 42 TABLET | Refills: 0 | Status: SHIPPED | OUTPATIENT
Start: 2024-06-24 | End: 2024-07-15

## 2024-06-24 RX ORDER — METHYLPREDNISOLONE SODIUM SUCCINATE 125 MG/2ML
125 INJECTION, POWDER, LYOPHILIZED, FOR SOLUTION INTRAMUSCULAR; INTRAVENOUS
Status: CANCELLED
Start: 2024-06-24

## 2024-06-24 RX ORDER — EPINEPHRINE 1 MG/ML
0.3 INJECTION, SOLUTION INTRAMUSCULAR; SUBCUTANEOUS EVERY 5 MIN PRN
Status: CANCELLED | OUTPATIENT
Start: 2024-06-24

## 2024-06-24 RX ORDER — HEPARIN SODIUM,PORCINE 10 UNIT/ML
5-20 VIAL (ML) INTRAVENOUS DAILY PRN
Status: CANCELLED | OUTPATIENT
Start: 2024-06-24

## 2024-06-24 RX ORDER — DIPHENHYDRAMINE HYDROCHLORIDE 50 MG/ML
50 INJECTION INTRAMUSCULAR; INTRAVENOUS
Status: CANCELLED
Start: 2024-06-24

## 2024-06-24 RX ORDER — PALONOSETRON 0.05 MG/ML
0.25 INJECTION, SOLUTION INTRAVENOUS ONCE
Status: COMPLETED | OUTPATIENT
Start: 2024-06-24 | End: 2024-06-24

## 2024-06-24 RX ORDER — ALBUTEROL SULFATE 0.83 MG/ML
2.5 SOLUTION RESPIRATORY (INHALATION)
Status: CANCELLED | OUTPATIENT
Start: 2024-06-24

## 2024-06-24 RX ADMIN — FAMOTIDINE 20 MG: 10 INJECTION INTRAVENOUS at 09:44

## 2024-06-24 RX ADMIN — SODIUM CHLORIDE 39 MG: 9 INJECTION, SOLUTION INTRAVENOUS at 11:00

## 2024-06-24 RX ADMIN — Medication 5 ML: at 12:45

## 2024-06-24 RX ADMIN — DIPHENHYDRAMINE HYDROCHLORIDE 25 MG: 50 INJECTION, SOLUTION INTRAMUSCULAR; INTRAVENOUS at 09:46

## 2024-06-24 RX ADMIN — SODIUM CHLORIDE 250 ML: 9 INJECTION, SOLUTION INTRAVENOUS at 09:37

## 2024-06-24 RX ADMIN — FOSAPREPITANT: 150 INJECTION, POWDER, LYOPHILIZED, FOR SOLUTION INTRAVENOUS at 10:03

## 2024-06-24 RX ADMIN — PEGFILGRASTIM 6 MG: KIT SUBCUTANEOUS at 12:41

## 2024-06-24 RX ADMIN — CARBOPLATIN 500 MG: 10 INJECTION, SOLUTION INTRAVENOUS at 12:07

## 2024-06-24 RX ADMIN — PALONOSETRON HYDROCHLORIDE 0.25 MG: 0.25 INJECTION INTRAVENOUS at 09:41

## 2024-06-24 RX ADMIN — HEPARIN 5 ML: 100 SYRINGE at 07:49

## 2024-06-24 ASSESSMENT — PAIN SCALES - GENERAL: PAINLEVEL: NO PAIN (0)

## 2024-06-24 NOTE — NURSING NOTE
"Oncology Rooming Note    June 24, 2024 8:18 AM   Walter Reyes is a 64 year old male who presents for:    Chief Complaint   Patient presents with    Port Draw     Labs drawn via port by RN in lab. VS taken.      Initial Vitals: /56   Pulse 67   Temp 98.6  F (37  C) (Oral)   Resp 16   Wt 114.7 kg (252 lb 14.4 oz)   SpO2 96%   BMI 34.63 kg/m   Estimated body mass index is 34.63 kg/m  as calculated from the following:    Height as of 5/13/24: 1.82 m (5' 11.65\").    Weight as of this encounter: 114.7 kg (252 lb 14.4 oz). Body surface area is 2.41 meters squared.  No Pain (0) Comment: Data Unavailable   No LMP for male patient.  Allergies reviewed: Yes  Medications reviewed: Yes    Medications: MEDICATION REFILLS NEEDED TODAY. Provider was notified.  Pharmacy name entered into EPIC:    [facility] [place], [place] - [address] [facility] - [place], [place] - [address] [place] [address] PHARMACY [place], [place] - [address] [facility] [phone]  [facility] [place], [place] - [address] [facility]    Frailty Screening:   Is the patient here for a new oncology consult visit in cancer care? 2. No      Clinical concerns: Refill Compazine and prednisone downstairs and bring to patient in infusion   Dr. Mcnair  was notified.      Evangelina Shankar              "

## 2024-06-24 NOTE — PATIENT INSTRUCTIONS
Walter,   It was nice to see you today.    We will continue with your current carboplatin/cabazitaxel for now.  Please keep us in the loop on whether your neuropathy is getting worse.    Also let us know if you develop a cough, fever or shortness of breath.  It is not clear what the nodules are, but the radiologist thought possibly infection.  We will watch for now since you are otherwise feeling pretty good.  Here is the rest of your plan.     1. Proceed with C7 of carboplatin/cabazitaxel.  20% dose reduction of cabazi.  Consider dose reduction of carbo next if neuropathy is worse.   2. Procalcitonin added to existing 6/24 labs for initial infection evaluation.    3. Return in 3 weeks for C8.    4. Restaging in 3 months with CT CAP and NM bone scan.  Sooner if new pain or symptoms.    5. Refills for prednisone/compazine sent

## 2024-06-24 NOTE — NURSING NOTE
"Oncology Rooming Note    June 24, 2024 8:24 AM   Walter Reyes is a 64 year old male who presents for:    Chief Complaint   Patient presents with    Port Draw     Labs drawn via port by RN in lab. VS taken.      Initial Vitals: /56   Pulse 67   Temp 98.6  F (37  C) (Oral)   Resp 16   Wt 114.7 kg (252 lb 14.4 oz)   SpO2 96%   BMI 34.63 kg/m   Estimated body mass index is 34.63 kg/m  as calculated from the following:    Height as of 5/13/24: 1.82 m (5' 11.65\").    Weight as of this encounter: 114.7 kg (252 lb 14.4 oz). Body surface area is 2.41 meters squared.  No Pain (0) Comment: Data Unavailable   No LMP for male patient.  Allergies reviewed: Yes  Medications reviewed: Yes    Medications: MEDICATION REFILLS NEEDED TODAY. Provider was notified.  Pharmacy name entered into EPIC:    [facility] [place], [place] - [address] [facility] - [place], [place] - [address] [place] [address] PHARMACY [place], [place] - [address] [facility] [phone]  [facility] [place], [place] - [address] [facility]    Frailty Screening:   Is the patient here for a new oncology consult visit in cancer care? 2. No      Clinical concerns: Refill Prednisone and compazine and bring to patient in infusion   Dr. Mcnair was notified.      Evangelina Shankar              "

## 2024-06-24 NOTE — LETTER
"6/24/2024      Walter Reyes  [address] [facility] [address]      Dear Colleague,    Thank you for referring your patient, Walter Reyes, to the [facility]. Please see a copy of my visit note below.        [facility] Oncology Followup  Jun 24, 2024    REASON FOR VISIT: Follow-up for metastatic castration-resistant prostate cancer.      INTERVAL HISTORY:   Feels good today.  Notes he has 1 bad week, 1 OK week and 1 good week with each 3 week cycle.  Appetite is OK.  Not getting acupuncture yet due to lack of prior auth from acupuncture team.  He does think it was helpful.  Notes it is mainly in his feet.  Nausea is controlled.  No falls or tripping due to neuropathy.  LLE edema is much better with compression stocking, pending custom compression with lymphedema team.  He has no fevers, chills or cough.  Night sweats/hot flashes are unchanged.  No shortness of breath.  He did have covid about 2 weeks ago, but did not have significant symptoms.      Review of Systems:  Remainder of 12 point ROS reviewed and otherwise negative except as in interval history.     ONCOLOGY HISTORY: Mr. 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 His oncologic history is detailed below.     1. 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 Naylor at [facility]. Prior PSA were 1.4 on 8/16/17, 2.4 on 6/28/16, 2.9 on 6/28/16, and as low as 0.4 on 3/26/2003.   - 1/08/2019: Consultation with Sarah Wilson CNP in Urology clinic. Repeat PSA 9.6.  - 1/16/2019: MRI prostate with contrast - \"This " "examination is characterized as PIRADS 5- very high probability. Clinically significant cancer is highly likely to be present. There is a large, invasive mass arising from the right peripheral zone and extending into the neurovascular bundle, seminal vesicle, and along the anterolateral right mesorectal fascia. Metastatic right external iliac lymph node. Metastatic lesion in the posterior/superior right acetabulum.\"  - 1/25/2019: CT abdomen and pelvis with IV contrast - \"1. Heterogeneous enhancing mass posteriorly in the upper pole of the right kidney measures 4.5 x 5.8 x 5.7 cm (AP by transverse by craniocaudal). It has a small nodular component extending posterior medially which abuts the right psoas muscle. This nodular extension measures 2.1 x 2.1 cm. Minimal stranding about the mass. No definite thrombus within the right renal vein. This renal mass is compatible with a renal cell carcinoma. A paraaortic lymph node situated immediately posterior to the left renal vein measures 1.1 cm in short axis, suspicious for metastasis. 2. A 2 cm right external iliac lymph node is also suspicious for metastases. 3. Multiple sclerotic osseous lesions suspicious for metastases. These include 0.8 and 0.6 cm sclerotic lesions laterally in the right iliac wing (images 53 and 62 respectively). Ill-defined groundglass density laterally in the right acetabulum measuring 1.7 x 1.2 cm corresponds with the lesion identified on MRI. A 0.4 cm groundglass density in the left acetabulum. Sclerotic lesion in the left femoral neck measures 0.9 x 1.6 cm (image 81). Sclerotic metastases would be more compatible with prostate metastases. 4. A 3 subcentimeter hepatic lesions are indeterminate. Metastases would be a consideration. These can be further characterized with liver MRI.\"  - 1/25/2019: NM bone scan - \"There is focal bony uptake in the left femoral neck, right acetabulum, right of midline at the S1 or L5 level of the spine, within " "multiple bilateral ribs, in the C7 or T1 level of the spine, and anteriorly within the skull. Findings are suspicious for metastases.\"  - 1/31/19: CT chest with contrast - \" No suspicious nodules in the chest.  Stable appearance of a 5.8 cm right renal mass concerning for renal carcinoma until proven otherwise.  Stable indeterminant subcentimeter hypodensities in the liver.  Multifocal osteoblastic metastasis including 2.5 cm lesion in the right fifth rib posteriorly and a 1.6 cm lesion in the right third rib posteriorly. No lytic lesions identified.\"  - 2/6/19: CT-guided right sclerotic fifth rib lesion biopsy - \"Metastatic carcinoma, consistent with prostate primary.  Immunohistochemical stains performed show the metastatic carcinoma stains positive for NKX3.1 (prostate marker), negative for STACIE 3 and PAX8, which supports the above diagnosis.\"  - 2/15/19: Started Casodex 50mg every day and consented for the biobanking protocol. 3/18/19 - stopped Casodex.  - 2/19/19: Case discussed in tumor board - recommendation for nephectomy for suspected malignant right renal mass.   - 2/26/19: Started Lupron 22.5mg every 3 months.   - 5/8/19: Started Docetaxel 75mg/m2 IV every 3 weeks. 06/18/19 - cycle 3, 7/10/19 - cycle 4, 07/31/19 - cycle 5, 08/21/19 - cycle 6.   - 10/2/19: Restaging CT CAP and NM Bone Scan showed improved osseous disease, no evidence of recurrent or new metastatic disease.  - 1/5/20: Restaging CT CAP and NM Bone Scan showed stable osseous disease, no evidence of recurrent or new metastatic disease.  - 4/6/20: NM bone scan with slightly improved uptake in known skeletal mets. CT C/A/P with contrast with stable osseous mets, no yusuf enlargement, no new visceral mets etc.  - 04/08/20: Zometa every 3 months.  - 10/5/20: CT C/A/P with contrast and NM bone scan - SD.   - 1/4/21: CT C/A/P with contrast and NM bone scan - SD but slight increase in right 5th rib tracer uptake and in posterior L5 sclerosis. " "1/6/21  PSA = 0.29  - 03/29/21: CT C/A/P with contrast - single new right sacral 8mm bone lesion (suspicious), other bone mets stable. No visceral/yusuf disease. Nephrectomy site without recurrence. NM bone scan - SD but \"increased uptake associated with the prior lesions of the lower cervical spine, posterior right fourth rib and right L5 posterior arch elements. Otherwise unchanged uptake associated with the proximal left femur and left anterior fourth rib. Similar uptake of the left halux, possibly secondary to degenerative osteoarthritis or gout.\"    3/31/21 PSA = 0.44  7/7/21  PSA = 0.47  11/2021 PSA = 1.05  -- Start XTANDI  12/16/21 PSA =0.22  2/11/22 PSA= 0.50  3/22/22 PSA=0.72  5/5/2022 PSA=1.79  7/11/2022 PSA=2.16 --Start cycle 1 docetaxel   8/22/22 PSA = 1.36  9/12/22 PSA = 1.09  10/24/22 Cycle #6 of Docetaxel. End of treatment  1/9/23  PSA =0.66  3/20/23  PSA=1.54  4/21/23 PSA = 1.81  T=15  5/22/23 C1 Pluvicto   06/07/23          PSA = 1.42  7/18/23 PSA=1.75, C2 Pluvicto   8/28/23 Transfer of care initial visit for Tabby.  PSA 2.06.  C3 Pluvicto scheduled for 8/30.  Proceed with dose as planned.  MR L femur, ortho referral, PSMA PET ordered for prior to follow-up.    10/2/23 Palliative RT to L femur metastasis complete.  PSMA PET with mixed response.  PSA 2.21.  RT to L femur pending.  Continue with Dose #4 of Pluvicto despite mixed response to therapy.  Dex course for possible pain flare with RT.    11/8/23  Follow-up prior to Dose #5 Pluvicto.  More pain in chest/ribs/back. PSA 4.38.  Testosterone=3.  Rad onc referral for ribs.  Biopsy progressive lesion for progression on Pluvicto.  Cabazitaxel scheduled for follow-up appointment.  Tempus blood testing unrevealing for targetable mutation.    11/27/23 Bx completed from R iliac crest but unrevealing for either PCa or RCC.  Start cabazitaxel C1D1.  PSA 2.81.    12/19/23 PSA = 3.66  1/8/24 PSA = 3.55  1/29/24 PSA = 3.07  2/19/24 PSA= 2.21-Start " "carboplatin/cabazitaxel.    3/11/24 PSA= C2 Carbo/cabazi.  PSA 1.99.    4/01/24 PSA= 1.74. C3 Carbo/cabazi.   4/22/24 PSA= 1.85. C4 Carbo/cabazi.   5/13/24 PSA = 2.06. C5 Carbo/cabazi.   6/3/24 PSA = 2.68, cycle 6 Carboplatin/cabazitaxel  6/24/24 PSA 3.51, C7 carboplatin/cabazitaxel.  Bony lesions stable on NM bone scan.  Reticulonodular densities noted in lungs as possible infection vs drug reaction.  Add chromogranin A to next labs.     Radiation history:   -C7         3,000 cGy       06 X      8/05/2021        8/18/2021        13       10  -2 Sacrum          2,500 cGy       18 X      4/12/2022        4/18/2022         6           -Sacrum retreat               700 cGy        18 X      2/28/2023        2/28/2023  -Left femur to 2000 cGy in 5 fractions, completed 10/10/2023   -bilateral posterior chest wall at an area of osseous involvement of the ribs and adjacent T4 and 5 spine, to be delivered to 2000 cGy in 5 fractions.  completed 12/13-12/19/23.    2. Stage III ([id number]), grade 3 of 4, clear-cell RCC of right kidney:  - Incidentally diagnosed as above.   - Underwent curative-intent robotic right radical nephrectomy with Dr. Wesley Cleveland on 3/19/19. No tumor spillage per op report.   - Path showed: \"Histologic Type: Clear cell renal cell carcinoma; Sarcomatoid Features: Not identified; Histologic Grade: Nucleolar grade 3 (WHO/ISUP). Extent Tumor Size: 4.3 cm. Microscopic Tumor Extension: Tumor extension into renal sinus (in vascular structures). Margins: Negative. Tumor Necrosis: Present; focal. Lymph-Vascular Invasion: Not identified.  Pathologic Staging (pTNM) Primary Tumor (pT): pT3a: Tumor extends into renal vein branches/renal sinus. Regional Lymph Nodes (pN): pNX. Number of Lymph Nodes Examined: 0 Distant Metastasis (pM): pM N/A.\"  - Restaging scans as above.  No current concerns for recurrence.      PAST MEDICAL HISTORY:  Past Medical History:   Diagnosis Date     Cancer of kidney, right (H)      " Complication of anesthesia     slow wakeup      Malignant neoplasm of prostate metastatic to bone (H) 2/14/2019     Thyroid disease      CURRENT MEDICATIONS:   Current Outpatient Medications:      atorvastatin (LIPITOR) 20 MG tablet, Take 60 mg by mouth daily , Disp: , Rfl:      BERBERINE CHLORIDE PO, , Disp: , Rfl:      calcium citrate-vitamin D (CITRACAL) 315-250 MG-UNIT TABS per tablet, Take 500 mg by mouth 2 times daily With 800 Vd per pt, Disp: , Rfl:      cycloSPORINE (RESTASIS) 0.05 % ophthalmic emulsion, Place 1 drop into both eyes 2 times daily , Disp: , Rfl:      dexAMETHasone (DECADRON) 4 MG tablet, Take 2 tablets (8 mg) by mouth daily Take for 3 days, starting the day after chemo. Take with food., Disp: 6 tablet, Rfl: 2     dexAMETHasone (DECADRON) 4 MG tablet, Take 1 tablet (4 mg) by mouth daily, Disp: 7 tablet, Rfl: 2     fentaNYL (DURAGESIC) 25 mcg/hr 72 hr patch, Place 1 patch onto the skin every 72 hours remove old patch., Disp: 10 patch, Rfl: 0     furosemide (LASIX) 20 MG tablet, Take 1 tablet (20 mg) by mouth daily, Disp: 15 tablet, Rfl: 1     gabapentin (NEURONTIN) 300 MG capsule, Take 1 capsule (300 mg) by mouth every morning AND 2 capsules (600 mg) daily at 2 pm AND 2 capsules (600 mg) at bedtime., Disp: 150 capsule, Rfl: 3     levothyroxine (SYNTHROID/LEVOTHROID) 112 MCG tablet, Take 112 mcg by mouth daily, Disp: , Rfl:      lidocaine, viscous, (XYLOCAINE) 2 % solution, Swish and swallow 15 mLs in mouth every 3 hours as needed for pain (before meals and at bedtime) ; Max 8 doses/24 hour period., Disp: 100 mL, Rfl: 1     loratadine (CLARITIN) 10 MG tablet, Take 10 mg by mouth daily, Disp: , Rfl:      Multiple Vitamins-Minerals (MULTIVITAMIN ADULT EXTRA C PO), Take 1 tablet by mouth every 24 hours, Disp: , Rfl:      naloxone (NARCAN) 4 MG/0.1ML nasal spray, Spray 1 spray (4 mg) into one nostril alternating nostrils as needed for opioid reversal every 2-3 minutes until assistance arrives, Disp:  0.2 mL, Rfl: 1     Nutritional Supplements (SALMON OIL) CAPS, Take 2 capsules by mouth daily , Disp: , Rfl:      omeprazole (PRILOSEC) 20 MG DR capsule, Take 20 mg by mouth daily, Disp: , Rfl:      ondansetron (ZOFRAN) 8 MG tablet, Take 1 tablet (8 mg) by mouth every 8 hours as needed for nausea (vomiting), Disp: 30 tablet, Rfl: 2     ondansetron (ZOFRAN) 8 MG tablet, Take 1 tablet (8 mg) by mouth every 8 hours as needed for nausea, Disp: 30 tablet, Rfl: 4     oxyCODONE (ROXICODONE) 5 MG tablet, Take 1-2 tablets (5-10 mg) by mouth every 3 hours as needed for severe pain, Disp: 90 tablet, Rfl: 0     predniSONE (DELTASONE) 5 MG tablet, Take 1 tablet (5 mg) by mouth 2 times daily (with meals) for 21 days, Disp: 42 tablet, Rfl: 0     prochlorperazine (COMPAZINE) 10 MG tablet, Take 1 tablet (10 mg) by mouth every 6 hours as needed for nausea or vomiting, Disp: 30 tablet, Rfl: 2     tamsulosin (FLOMAX) 0.4 MG capsule, Take 1 capsule (0.4 mg) by mouth daily, Disp: 30 capsule, Rfl: 3     triamterene-HCTZ (MAXZIDE-25) 37.5-25 MG tablet, Take 1 tablet by mouth daily, Disp: , Rfl:       Physical Exam:   There were no vitals taken for this visit.  Wt Readings from Last 10 Encounters:   06/03/24 116 kg (255 lb 12.8 oz)   05/13/24 117.9 kg (259 lb 14.4 oz)   04/22/24 118.3 kg (260 lb 14.4 oz)   04/01/24 119.4 kg (263 lb 3.2 oz)   03/28/24 116.1 kg (256 lb)   03/11/24 119.3 kg (263 lb 1.6 oz)   02/19/24 121.2 kg (267 lb 4.8 oz)   02/13/24 121.4 kg (267 lb 11.2 oz)   01/29/24 122.7 kg (270 lb 6.4 oz)   01/24/24 117.9 kg (260 lb)   General: The patient is a pleasant male in no acute distress.  HEENT: EOMI. Sclerae are anicteric. Mucous membranes moist without lesions or thrush.   Lymph: Neck is supple with no lymphadenopathy in the cervical or supraclavicular areas.   Heart: Regular rate and rhythm.   Lungs: normal work of breathing on RA.    Abdomen: Bowel sounds present, soft, non-distended.    Extremities:trace pitting lower  "extremity edema in the left leg into the thigh, trace right sided edema.   Neuro: Cranial nerves II through XII are grossly intact.  Skin: No rashes, petechiae, or bruising noted on exposed skin.  Psych: affect bright, alert and oriented    LABORATORY DATA:  Most Recent 3 CBC's:  Recent Labs   Lab Test 06/03/24  0752 05/13/24  0916 04/22/24  0909   WBC 4.1 4.7 4.4   HGB 10.0* 9.7* 9.9*   * 102* 99   * 155 166   ANEUTAUTO 2.8 3.3 3.0     Most Recent 3 BMP's:  Recent Labs   Lab Test 06/03/24  0752 05/22/24  0915 05/13/24  0916 04/22/24  0909    140 142 143   POTASSIUM 3.9 4.1 3.9 4.0   CHLORIDE 106 104 109* 108*   CO2 24 23 23 25   BUN 14.6 12.4 15.3 14.3   CR 1.00 0.89 0.95 0.92   ANIONGAP 10 13 10 10   BETHANY 8.5* 8.5* 8.6* 8.6*   * 97 111* 92   PROTTOTAL 6.2*  --  6.1* 6.0*   ALBUMIN 3.9  --  4.0 4.0    Most Recent 3 LFT's:  Recent Labs   Lab Test 06/03/24 0752 05/13/24  0916 04/22/24  0909   AST 22 21 19   ALT 18 19 22   ALKPHOS 73 76 74   BILITOTAL 0.3 0.3 0.4      Latest Reference Range & Units 02/19/24 11:03 03/11/24 08:49 04/01/24 09:30 04/22/24 09:09 05/13/24 09:16   PSA Tumor Marker 0.00 - 4.50 ng/mL 2.21 1.99 1.74 1.85 2.06        CT CAP 6/20/24: Reviewed.  No evidence for new adenopathy, lung findings drug or infection related.      NM bone scan from 6/20/24 overall stable.      ASSESSMENT & PLAN: Mr. Reyes is a 64 year old gentleman with metastatic castration sensitive prostate adenocarcinoma as well as an incidentally diagnosed stage III clear-cell renal cell carcinoma of the right kidney (s/p radical R nephrectomy 3/19/19), who is here prior to cycle 3 cabazitaxel.      1. Metastatic castration-resistant prostate cancer, with involvement of the bones and RPLN:   - Patient met the criteria for CHAARTED \"high-volume\" metastatic hormone-sensitive prostate cancer. He opted for docetaxel in combination with ADT (per JOSEFA, [id number], FELIPEEDE). He was subsequently " treated with docetaxel x6 doses in the [facility] setting and enzalutamide.  He initiated Pluvicto in May 2023 with an initial slight decline in PSA, but this began to rise just prior to Cycle 3.  Given his XR evidence of progression in L femur and PSA rise, we re-evaluated his progression with PSMA PET scan in September 2023, with note of mixed response.  Canceled Pluvicto Dose for November due to symptomatic progression with PSA rise. PSA is labile. Biopsy results from 11/21 was unrevealing for evaluation of NEPC vs small cell given progression without a significant rise in PSA. Tempus peripheral blood mutation analysis is unrevealing for targetable mutations. Cabazitaxel started 11/27/23 given progression on Pluvicto. Carboplatin added 2/2024 and cabazitaxel dose reduced by 20% in April 2024 due to neuropathy.  Restaging with pulmonary consolidative findings without mass-like features.  Will add chromogranin A to next labs and infectious workup.  He was otherwise asymptomatic from an infection standpoint.    -Lupron next due 7/2024.   -Cycle 7 to remain at 20% reduced     2. Pressure in chest, resolved:  Has an ongoing sensation of pressure/need to cough in his chest, primarily against his sternum. Vital signs stable, negative pulmonary physical exam. No chest pain.     3. Bone metastases:    - Noted to have osseous metastatic disease at the time of diagnosis. S/p radiation to C spine and sacrum (Re-irradiation to sacrum).   - pain mgmt per palliative care, pain is now well controlled with fentanyl patch. Dexamethasone burst is helpful for pain flares and he has these available as needed.    - RT to L femur 10/2023.  RT for bilateral posterior ribs 12/2023 with Dr. Albert.  - Encouraged to continue calcium and vitamin D supplementation; continue Zometa 4mg IV every ~3 months.   - Last Zometa given 5/20/24.  Next due August 2024.       4. Stage 3, UISS-high risk ccRCC: s/p R nephrectomy   - Nephrectomy 3/19/19 and noted  incidentally.  He is now 4.5 years post-op without evidence for recurrence.   - At this point, there is a minimal risk of recurrence of his RCC given the time since surgery without the use of adjuvant immunotherapy. There is no suspicious adenopathy or other features on his most recent imaging studies.    - Will continue to monitor with imaging planned for prostate cancer.      5. Sensory neuropathy, Toes/feet Grade 2  - continue monitor for worsening with cabazitaxel.   - Continue acupuncture once weekly. Patient working with insurance to try to increase to twice a week.   - He is on gabapentin managed by palliative care 300 mg in the am, 300 mg in the afternoon, and 600 mg at HS. Discussed with Walter increasing his afternoon dose of gabapentin from 300 mg to 600 mg. He will try this and he will update palliative care as well.      6. Anemia  - likely secondary to chemotherapy. Iron levels adequate on 4/22/24. MCV elevated.  Folate and B12 adequate.  Retic appropriately elevated.      7. Left lower extremity swelling   - US 4/22/24 negative for DVT. CT abdomen pelvis 4/29 without significant or new lymphadenopathy. Continue compression stocking. Following with lymphedema therapy, has a lymphatic massage scheduled next week.      PLAN:   1. Proceed with C7 of carboplatin/cabazitaxel.  20% dose reduction of cabazi.  Consider dose reduction of carbo next if neuropathy is worse.   2. Procalcitonin added to existing 6/24 labs for initial infection evaluation.    3. Return in 3 weeks for C8.    4. Restaging in 3 months with CT CAP and NM bone scan.  Sooner if new pain or symptoms.    5. Refills for prednisone/compazine sent    The longitudinal plan of care for metastatic hormone resistant prostate cancer was addressed during this visit. Due to the added complexity in care, I will continue to support Walter Reyes in the subsequent management of this condition(s) and with the ongoing continuity of care of this  condition(s).     A total of 40 minutes spent on the date of the encounter doing chart review, review of test results, interpretation of tests, patient visit, and documentation.  The patient was given the opportunity to ask multiple questions today, all of which were answered to their satisfaction.     Omar Mcnair MD, PhD   of Medicine   Oncology/BMT/Cellular Therapies        Again, thank you for allowing me to participate in the care of your patient.        Sincerely,        Omar Mcnair MD

## 2024-06-24 NOTE — PROGRESS NOTES
Infusion Nursing Note:  Walter PUENTES Leobardo Delgadohi presents today for Cycle 7 Day 1 cabazitaxel (JEVTANA), carboplatin, and Neulasta On-Pro.    Patient seen by provider today: Yes: Dr. Mcnair   present during visit today: Not Applicable.    Note: Patient offers no new concerns upon his arrival in infusion after his visit with Dr. Mcnair this morning.      Intravenous Access:  Implanted Port.    Treatment Conditions:  Lab Results   Component Value Date    HGB 10.0 (L) 06/24/2024    WBC 3.9 (L) 06/24/2024    ANEU 5.0 07/07/2021    ANEUTAUTO 2.9 06/24/2024     06/24/2024        Lab Results   Component Value Date     06/24/2024    POTASSIUM 4.5 06/24/2024    MAG 1.9 08/25/2023    CR 1.01 06/24/2024    BETHANY 8.7 (L) 06/24/2024    BILITOTAL 0.3 06/24/2024    ALBUMIN 4.0 06/24/2024    ALT 16 06/24/2024    AST 21 06/24/2024       Results reviewed, labs MET treatment parameters, ok to proceed with treatment.      Post Infusion Assessment:  Patient tolerated infusion without incident.  Blood return noted pre and post infusion.  Site patent and intact, free from redness, edema or discomfort.  No evidence of extravasations.  Access discontinued per protocol.     ONPRO  Was placed on patient's: left side of abdomen.    Was placed at 12:41 PM    Podpal used: Yes    Patient education included: what patient can expect after application, what colored lights mean on the device, when to remove device, when and where to call with questions or issues, all patients questions answered, and that Neulasta administration will occur at 1541 on 6/25.    Patient tolerated administration well.    Discharge Plan:   Prescription refills given for Compazine and Prednisone.  Discharge instructions reviewed with: Patient.  Patient and/or family verbalized understanding of discharge instructions and all questions answered.  AVS to patient via Mix & MeetT.  Patient will return 7/15/24 for next appointment.   Patient discharged in stable  condition accompanied by: wife.  Departure Mode: Ambulatory.      WILMER HANSEN RN

## 2024-06-24 NOTE — NURSING NOTE
Chief Complaint   Patient presents with    Port Draw     Labs drawn via port by RN in lab. VS taken.      Labs drawn via Port accessed using 20g flat needle. Line flushed and Heparin locked. Vital signs taken. Checked into next appointment.     Veda Leon RN

## 2024-06-28 ENCOUNTER — THERAPY VISIT (OUTPATIENT)
Dept: OCCUPATIONAL THERAPY | Facility: CLINIC | Age: 64
End: 2024-06-28
Attending: FAMILY MEDICINE
Payer: COMMERCIAL

## 2024-06-28 DIAGNOSIS — I89.0 LYMPHEDEMA: Primary | ICD-10-CM

## 2024-06-28 LAB — TESTOST SERPL-MCNC: <2 NG/DL (ref 240–950)

## 2024-06-28 PROCEDURE — 97140 MANUAL THERAPY 1/> REGIONS: CPT | Mod: GO | Performed by: OCCUPATIONAL THERAPIST

## 2024-07-03 ENCOUNTER — VIRTUAL VISIT (OUTPATIENT)
Dept: PALLIATIVE MEDICINE | Facility: CLINIC | Age: 64
End: 2024-07-03
Attending: NURSE PRACTITIONER
Payer: COMMERCIAL

## 2024-07-03 VITALS — HEIGHT: 71 IN | BODY MASS INDEX: 35.42 KG/M2 | WEIGHT: 253 LBS

## 2024-07-03 DIAGNOSIS — G63 NEUROPATHY ASSOCIATED WITH CANCER (H): Primary | ICD-10-CM

## 2024-07-03 DIAGNOSIS — M54.10 RADICULOPATHY, UNSPECIFIED SPINAL REGION: ICD-10-CM

## 2024-07-03 DIAGNOSIS — C61 PROSTATE CANCER (H): ICD-10-CM

## 2024-07-03 DIAGNOSIS — C80.1 NEUROPATHY ASSOCIATED WITH CANCER (H): Primary | ICD-10-CM

## 2024-07-03 DIAGNOSIS — G89.3 CANCER ASSOCIATED PAIN: ICD-10-CM

## 2024-07-03 PROCEDURE — 99215 OFFICE O/P EST HI 40 MIN: CPT | Mod: 95 | Performed by: NURSE PRACTITIONER

## 2024-07-03 RX ORDER — OXYCODONE HYDROCHLORIDE 5 MG/1
5-10 TABLET ORAL
Qty: 90 TABLET | Refills: 0 | Status: SHIPPED | OUTPATIENT
Start: 2024-07-03 | End: 2024-10-01

## 2024-07-03 RX ORDER — GABAPENTIN 300 MG/1
CAPSULE ORAL
Qty: 150 CAPSULE | Refills: 3 | Status: SHIPPED | OUTPATIENT
Start: 2024-07-03

## 2024-07-03 RX ORDER — FENTANYL 25 UG/1
1 PATCH TRANSDERMAL
Qty: 10 PATCH | Refills: 0 | Status: SHIPPED | OUTPATIENT
Start: 2024-07-03 | End: 2024-07-24

## 2024-07-03 RX ORDER — DULOXETIN HYDROCHLORIDE 30 MG/1
30 CAPSULE, DELAYED RELEASE ORAL DAILY
Qty: 30 CAPSULE | Refills: 1 | Status: SHIPPED | OUTPATIENT
Start: 2024-07-03 | End: 2024-07-24

## 2024-07-03 ASSESSMENT — PAIN SCALES - GENERAL: PAINLEVEL: NO PAIN (0)

## 2024-07-03 NOTE — PATIENT INSTRUCTIONS
Thank you for meeting with us in the [facility].    How to get a hold of us:  For non-urgent matters, MyChart works best.    For more urgent matters, or if you prefer not to use MyChart, call our clinic nurse coordinator Sherry Palma RN at [phone] or [phone]    We have an on-call number for evenings and weekends. Please call this only if you are having uncontrolled symptoms or serious side effects from your medicines: [phone].     For refills, please give us a week (5 working days) notice. We don't always have providers available everyday to do refills. If you call the day you run out of your medicine, we may not be able to refill it in time, so call 5 days in advance!

## 2024-07-03 NOTE — NURSING NOTE
Current patient location: [address] [facility] [address]    Is the patient currently in the state of [place]? YES    Visit mode:VIDEO    If the visit is dropped, the patient can be reconnected by: VIDEO VISIT: Text to cell phone:   Telephone Information:   Mobile [phone]       Will anyone else be joining the visit? NO  (If patient encounters technical issues they should call [phone])    How would you like to obtain your AVS? MyChart    Are changes needed to the allergy or medication list? Pt stated no changes to allergies and Pt stated no med changes    Are refills needed on medications prescribed by this physician? YES - OXYCODONE    Reason for visit: RECHSHELBY KAY

## 2024-07-03 NOTE — PROGRESS NOTES
Virtual Visit Details    Type of service:  Video Visit     Originating Location (pt. Location): Home    Distant Location (provider location):  Off-site  Platform used for Video Visit: [facility]      Palliative Care Outpatient Clinic      Patient ID/Chief Complaint: Walter Reyes 63 year old male who is presenting to the palliative medicine clinic today at the request of Yamilet Espinoza PA-C for a palliative care follow-up secondary to metastatic prostate cancer.   The patient's primary care provider is:  Poncho Ortiz       Impression & Recommendations:  63-year-old male with metastatic prostate cancer with spinal and bone metastases.  Palliative care assistance requested for symptom management of chronic cancer associated back pain with radiculopathy in the legs.    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  High risk for recurrence.    Past medical history also includes hyperlipidemia, hypothyroidism, HTN, GERD, Acosta's neuroma, plantar fasciitis, peroneal tendinitis, and mild chemotherapy-induced polyneuropathy.    Radiculopathy in both legs, has improved on gabapentin.  He has constant pain over the sacrum and to the left of the sacrum.  Pain is constant, deep, aching.  Interferes with sleep. Pain worsens when reclining or lying down. Due to progressive cancer, he is also having more rib and back pain. He is not seeking RTX at this time, rather opting for medication management.     Recent worsening and chemotherapy-induced polyneuropathy.    Symptoms/recommendations/discussion:  Healthcare directive present in EMR. Wife Micheline is agent if needed.   Continue fentanyl patch 25 mcg/h.   Continue gabapentin 300 mg every morning, 600 mg afternoon, 600 mg at bedtime.  Recent increase in gabapentin due to chemotherapy-induced polyneuropathy.  Start Cymbalta 30 mg daily for neuropathy and mood.  Can titrate to 60 mg daily after 10 days if  "well-tolerated.  If Cymbalta beneficial for neuropathy, consider weaning off gabapentin.  Working to arrange acupuncture.  Continue oxycodone IR 5-10 mg every 4 hours as needed--- generally takes only before bed.  Counseled to monitor for constipation  Counseled opioid safety, Narcan nasal spray ordered  Social situation includes residing in home with wife Micheline. Stable housing and food. No h/o substance abuse.   Palliative follow-up in 2-3 weeks. He has direct phone # for our team.         How to get a hold of us:  For non-urgent matters, MyChart works best.    For more urgent matters, or if you prefer not to use MyChart, call our clinic nurse coordinator Sherry Palma RN at [phone] or [phone]    We have an on-call number for evenings and weekends. Please call this only if you are having uncontrolled symptoms or serious side effects from your medicines: [phone].     For refills, please give us a week (5 working days) notice. We don't always have providers available everyday to do refills. If you call the day you run out of your medicine, we may not be able to refill it in time, so call 5 days in advance        History:  History gathered today from: patient, family/loved ones, medical chart, outside records including Care Everywhere      PE: Ht 1.803 m (5' 11\")   Wt 114.8 kg (253 lb)   BMI 35.29 kg/m     Wt Readings from Last 3 Encounters:   07/03/24 114.8 kg (253 lb)   06/24/24 114.7 kg (252 lb 14.4 oz)   06/03/24 116 kg (255 lb 12.8 oz)       Gen alert, comfortable appearing, NAD.   Head NCAT.  Eyes anicteric without injection  Face symmetric, eyes conjugate  Lungs unlabored, no cough, speaking full sentences  Skin no rashes or lesions evident on face/neck  Neuro Face symmetric, eyes conjugate; speech fluent.  Neuropsych exam normal including affect, sensorium, gross memory, thought processes, and fund of knowledge.         Data reviewed:  I reviewed electrolytes, BUN/creatinine, liver profile, " hemoglobin and hematocrit, platelet count, and most recent imaging  Recent oncology notes     database reviewed: 7/2        45 minutes spent on the date of the encounter doing chart review, history and exam, patient education & counseling, documentation and other activities as noted above.        Thank you for involving us in the patient's care.   LATESHA Seaman, [facility] Palliative Care Service

## 2024-07-15 ENCOUNTER — INFUSION THERAPY VISIT (OUTPATIENT)
Dept: ONCOLOGY | Facility: CLINIC | Age: 64
End: 2024-07-15
Payer: COMMERCIAL

## 2024-07-15 ENCOUNTER — APPOINTMENT (OUTPATIENT)
Dept: LAB | Facility: CLINIC | Age: 64
End: 2024-07-15
Attending: STUDENT IN AN ORGANIZED HEALTH CARE EDUCATION/TRAINING PROGRAM
Payer: COMMERCIAL

## 2024-07-15 ENCOUNTER — ONCOLOGY VISIT (OUTPATIENT)
Dept: ONCOLOGY | Facility: CLINIC | Age: 64
End: 2024-07-15
Attending: STUDENT IN AN ORGANIZED HEALTH CARE EDUCATION/TRAINING PROGRAM
Payer: COMMERCIAL

## 2024-07-15 VITALS
HEART RATE: 65 BPM | OXYGEN SATURATION: 95 % | RESPIRATION RATE: 16 BRPM | TEMPERATURE: 98.2 F | BODY MASS INDEX: 35.01 KG/M2 | SYSTOLIC BLOOD PRESSURE: 118 MMHG | WEIGHT: 251 LBS | DIASTOLIC BLOOD PRESSURE: 76 MMHG

## 2024-07-15 DIAGNOSIS — C61 MALIGNANT NEOPLASM OF PROSTATE METASTATIC TO BONE (H): Primary | ICD-10-CM

## 2024-07-15 DIAGNOSIS — G62.0 CHEMOTHERAPY-INDUCED PERIPHERAL NEUROPATHY (H): ICD-10-CM

## 2024-07-15 DIAGNOSIS — C79.51 MALIGNANT NEOPLASM OF PROSTATE METASTATIC TO BONE (H): Primary | ICD-10-CM

## 2024-07-15 DIAGNOSIS — Z76.89 PREVENTION OF CHEMOTHERAPY-INDUCED NEUTROPENIA: ICD-10-CM

## 2024-07-15 DIAGNOSIS — T45.1X5A CHEMOTHERAPY-INDUCED PERIPHERAL NEUROPATHY (H): ICD-10-CM

## 2024-07-15 LAB
ALBUMIN SERPL BCG-MCNC: 4 G/DL (ref 3.5–5.2)
ALP SERPL-CCNC: 73 U/L (ref 40–150)
ALT SERPL W P-5'-P-CCNC: 18 U/L (ref 0–70)
ANION GAP SERPL CALCULATED.3IONS-SCNC: 9 MMOL/L (ref 7–15)
AST SERPL W P-5'-P-CCNC: 23 U/L (ref 0–45)
BASOPHILS # BLD AUTO: 0 10E3/UL (ref 0–0.2)
BASOPHILS NFR BLD AUTO: 0 %
BILIRUB SERPL-MCNC: 0.3 MG/DL
BUN SERPL-MCNC: 11.1 MG/DL (ref 8–23)
CALCIUM SERPL-MCNC: 8.9 MG/DL (ref 8.8–10.2)
CHLORIDE SERPL-SCNC: 107 MMOL/L (ref 98–107)
CREAT SERPL-MCNC: 0.95 MG/DL (ref 0.67–1.17)
DEPRECATED HCO3 PLAS-SCNC: 25 MMOL/L (ref 22–29)
EGFRCR SERPLBLD CKD-EPI 2021: 90 ML/MIN/1.73M2
EOSINOPHIL # BLD AUTO: 0 10E3/UL (ref 0–0.7)
EOSINOPHIL NFR BLD AUTO: 0 %
ERYTHROCYTE [DISTWIDTH] IN BLOOD BY AUTOMATED COUNT: 16.5 % (ref 10–15)
GLUCOSE SERPL-MCNC: 105 MG/DL (ref 70–99)
HCT VFR BLD AUTO: 29.1 % (ref 40–53)
HGB BLD-MCNC: 9.3 G/DL (ref 13.3–17.7)
IMM GRANULOCYTES # BLD: 0 10E3/UL
IMM GRANULOCYTES NFR BLD: 1 %
LYMPHOCYTES # BLD AUTO: 0.6 10E3/UL (ref 0.8–5.3)
LYMPHOCYTES NFR BLD AUTO: 17 %
MCH RBC QN AUTO: 34.2 PG (ref 26.5–33)
MCHC RBC AUTO-ENTMCNC: 32 G/DL (ref 31.5–36.5)
MCV RBC AUTO: 107 FL (ref 78–100)
MONOCYTES # BLD AUTO: 0.5 10E3/UL (ref 0–1.3)
MONOCYTES NFR BLD AUTO: 13 %
NEUTROPHILS # BLD AUTO: 2.6 10E3/UL (ref 1.6–8.3)
NEUTROPHILS NFR BLD AUTO: 69 %
NRBC # BLD AUTO: 0 10E3/UL
NRBC BLD AUTO-RTO: 0 /100
PLATELET # BLD AUTO: 147 10E3/UL (ref 150–450)
POTASSIUM SERPL-SCNC: 3.9 MMOL/L (ref 3.4–5.3)
PROT SERPL-MCNC: 6.2 G/DL (ref 6.4–8.3)
PSA SERPL DL<=0.01 NG/ML-MCNC: 5.07 NG/ML (ref 0–4.5)
RBC # BLD AUTO: 2.72 10E6/UL (ref 4.4–5.9)
SODIUM SERPL-SCNC: 141 MMOL/L (ref 135–145)
WBC # BLD AUTO: 3.7 10E3/UL (ref 4–11)

## 2024-07-15 PROCEDURE — 84403 ASSAY OF TOTAL TESTOSTERONE: CPT

## 2024-07-15 PROCEDURE — 250N000011 HC RX IP 250 OP 636

## 2024-07-15 PROCEDURE — 80053 COMPREHEN METABOLIC PANEL: CPT

## 2024-07-15 PROCEDURE — 36415 COLL VENOUS BLD VENIPUNCTURE: CPT

## 2024-07-15 PROCEDURE — 84153 ASSAY OF PSA TOTAL: CPT

## 2024-07-15 PROCEDURE — 86316 IMMUNOASSAY TUMOR OTHER: CPT

## 2024-07-15 PROCEDURE — 99213 OFFICE O/P EST LOW 20 MIN: CPT

## 2024-07-15 PROCEDURE — 99214 OFFICE O/P EST MOD 30 MIN: CPT

## 2024-07-15 PROCEDURE — 96402 CHEMO HORMON ANTINEOPL SQ/IM: CPT

## 2024-07-15 PROCEDURE — 258N000003 HC RX IP 258 OP 636

## 2024-07-15 PROCEDURE — 96417 CHEMO IV INFUS EACH ADDL SEQ: CPT

## 2024-07-15 PROCEDURE — 96372 THER/PROPH/DIAG INJ SC/IM: CPT

## 2024-07-15 PROCEDURE — 96375 TX/PRO/DX INJ NEW DRUG ADDON: CPT

## 2024-07-15 PROCEDURE — G2211 COMPLEX E/M VISIT ADD ON: HCPCS

## 2024-07-15 PROCEDURE — 96367 TX/PROPH/DG ADDL SEQ IV INF: CPT

## 2024-07-15 PROCEDURE — 96413 CHEMO IV INFUSION 1 HR: CPT

## 2024-07-15 PROCEDURE — 85025 COMPLETE CBC W/AUTO DIFF WBC: CPT

## 2024-07-15 PROCEDURE — 96377 APPLICATON ON-BODY INJECTOR: CPT | Mod: 59

## 2024-07-15 RX ORDER — HEPARIN SODIUM (PORCINE) LOCK FLUSH IV SOLN 100 UNIT/ML 100 UNIT/ML
5 SOLUTION INTRAVENOUS
Status: DISCONTINUED | OUTPATIENT
Start: 2024-07-15 | End: 2024-07-15 | Stop reason: HOSPADM

## 2024-07-15 RX ORDER — ALBUTEROL SULFATE 90 UG/1
1-2 AEROSOL, METERED RESPIRATORY (INHALATION)
Status: CANCELLED
Start: 2024-07-15

## 2024-07-15 RX ORDER — DIPHENHYDRAMINE HYDROCHLORIDE 50 MG/ML
50 INJECTION INTRAMUSCULAR; INTRAVENOUS
Status: CANCELLED
Start: 2024-07-15

## 2024-07-15 RX ORDER — EPINEPHRINE 1 MG/ML
0.3 INJECTION, SOLUTION INTRAMUSCULAR; SUBCUTANEOUS EVERY 5 MIN PRN
Status: CANCELLED | OUTPATIENT
Start: 2024-07-15

## 2024-07-15 RX ORDER — ATORVASTATIN CALCIUM 80 MG/1
80 TABLET, FILM COATED ORAL DAILY
COMMUNITY
Start: 2024-06-07

## 2024-07-15 RX ORDER — METHYLPREDNISOLONE SODIUM SUCCINATE 125 MG/2ML
125 INJECTION, POWDER, LYOPHILIZED, FOR SOLUTION INTRAMUSCULAR; INTRAVENOUS
Status: CANCELLED
Start: 2024-07-15

## 2024-07-15 RX ORDER — ALBUTEROL SULFATE 0.83 MG/ML
2.5 SOLUTION RESPIRATORY (INHALATION)
Status: CANCELLED | OUTPATIENT
Start: 2024-07-15

## 2024-07-15 RX ORDER — HEPARIN SODIUM,PORCINE 10 UNIT/ML
5-20 VIAL (ML) INTRAVENOUS DAILY PRN
Status: CANCELLED | OUTPATIENT
Start: 2024-07-15

## 2024-07-15 RX ORDER — LORAZEPAM 2 MG/ML
0.5 INJECTION INTRAMUSCULAR EVERY 4 HOURS PRN
Status: CANCELLED | OUTPATIENT
Start: 2024-07-15

## 2024-07-15 RX ORDER — PALONOSETRON 0.05 MG/ML
0.25 INJECTION, SOLUTION INTRAVENOUS ONCE
Status: CANCELLED | OUTPATIENT
Start: 2024-07-15

## 2024-07-15 RX ORDER — PREDNISONE 5 MG/1
5 TABLET ORAL 2 TIMES DAILY WITH MEALS
Qty: 42 TABLET | Refills: 0 | Status: SHIPPED | OUTPATIENT
Start: 2024-07-15 | End: 2024-08-05

## 2024-07-15 RX ORDER — PALONOSETRON 0.05 MG/ML
0.25 INJECTION, SOLUTION INTRAVENOUS ONCE
Status: COMPLETED | OUTPATIENT
Start: 2024-07-15 | End: 2024-07-15

## 2024-07-15 RX ORDER — HEPARIN SODIUM (PORCINE) LOCK FLUSH IV SOLN 100 UNIT/ML 100 UNIT/ML
5 SOLUTION INTRAVENOUS
Status: CANCELLED | OUTPATIENT
Start: 2024-07-15

## 2024-07-15 RX ORDER — HEPARIN SODIUM (PORCINE) LOCK FLUSH IV SOLN 100 UNIT/ML 100 UNIT/ML
5 SOLUTION INTRAVENOUS
Status: DISCONTINUED | OUTPATIENT
Start: 2024-07-15 | End: 2024-08-01 | Stop reason: HOSPADM

## 2024-07-15 RX ORDER — MEPERIDINE HYDROCHLORIDE 25 MG/ML
25 INJECTION INTRAMUSCULAR; INTRAVENOUS; SUBCUTANEOUS EVERY 30 MIN PRN
Status: CANCELLED | OUTPATIENT
Start: 2024-07-15

## 2024-07-15 RX ADMIN — SODIUM CHLORIDE 38 MG: 9 INJECTION, SOLUTION INTRAVENOUS at 10:13

## 2024-07-15 RX ADMIN — DIPHENHYDRAMINE HYDROCHLORIDE 25 MG: 50 INJECTION, SOLUTION INTRAMUSCULAR; INTRAVENOUS at 08:54

## 2024-07-15 RX ADMIN — Medication 5 ML: at 11:52

## 2024-07-15 RX ADMIN — LEUPROLIDE ACETATE 22.5 MG: KIT at 08:33

## 2024-07-15 RX ADMIN — CARBOPLATIN 500 MG: 10 INJECTION, SOLUTION INTRAVENOUS at 11:18

## 2024-07-15 RX ADMIN — PEGFILGRASTIM 6 MG: KIT SUBCUTANEOUS at 11:21

## 2024-07-15 RX ADMIN — FOSAPREPITANT: 150 INJECTION, POWDER, LYOPHILIZED, FOR SOLUTION INTRAVENOUS at 09:15

## 2024-07-15 RX ADMIN — PALONOSETRON HYDROCHLORIDE 0.25 MG: 0.25 INJECTION INTRAVENOUS at 08:49

## 2024-07-15 RX ADMIN — SODIUM CHLORIDE 250 ML: 9 INJECTION, SOLUTION INTRAVENOUS at 08:44

## 2024-07-15 RX ADMIN — HEPARIN 5 ML: 100 SYRINGE at 06:30

## 2024-07-15 RX ADMIN — FAMOTIDINE 20 MG: 10 INJECTION INTRAVENOUS at 08:45

## 2024-07-15 ASSESSMENT — PAIN SCALES - GENERAL: PAINLEVEL: NO PAIN (1)

## 2024-07-15 NOTE — PATIENT INSTRUCTIONS
[facility] Triage and after hours / weekends / holidays:  [phone]    Please call the triage or after hours line if you experience a temperature greater than or equal to 100.4, shaking chills, have uncontrolled nausea, vomiting and/or diarrhea, dizziness, shortness of breath, chest pain, bleeding, unexplained bruising, or if you have any other new/concerning symptoms, questions or concerns.      If you are having any concerning symptoms or wish to speak to a provider before your next infusion visit, please call triage to notify them so we can adequately serve you.     If you need a refill on a narcotic prescription or other medication, please call before your infusion appointment.                July 2024 Sunday Monday Tuesday Wednesday Thursday Friday Saturday        1     2     3    RETURN PALLIATIVE   9:00 AM   (40 min.)   Dean Zeng APRN CNP   [facility] 4     5     6       7     8     9     10     11     12     13       14     15    LAB CENTRAL   6:15 AM   (15 min.)   [facility]   [facility]    RETURN CCSL   6:45 AM   (45 min.)   Sherry Lee APRN CNP   [facility]    ONC INFUSION 2.5 HR (150 MIN)   9:00 AM   (150 min.)    ONC INFUSION NURSE   [facility] 16     17     18     19     20       21     22     23     24    RETURN PALLIATIVE  11:00 AM   (40 min.)   Dean Zeng APRN CNP   [facility] 25     26     27       28     29     30     31                                August 2024 Sunday Monday Tuesday Wednesday Thursday Friday Saturday                       1     2     3       4     5    LAB CENTRAL   8:15 AM   (15 min.)   [facility]   [facility]    RETURN CCSL   8:45 AM   (45 min.)   Sherry Lee APRN CNP   [facility] 6     7     8     9     10       11     12     13     14      15     16     17       18     19     20     21     22     23     24       25     26    LAB CENTRAL  11:15 AM   (15 min.)   [facility] LAB DRAW   [facility]    RETURN CCSL  11:45 AM   (45 min.)   Sherry Lee APRN CNP   [facility]    ONC INFUSION 2.5 HR (150 MIN)   2:30 PM   (150 min.)    ONC INFUSION NURSE   [facility] 27     28     29     30     31                    Recent Results (from the past 24 hour(s))   PSA tumor marker    Collection Time: 07/15/24  6:28 AM   Result Value Ref Range    PSA Tumor Marker 5.07 (H) 0.00 - 4.50 ng/mL   Comprehensive metabolic panel    Collection Time: 07/15/24  6:28 AM   Result Value Ref Range    Sodium 141 135 - 145 mmol/L    Potassium 3.9 3.4 - 5.3 mmol/L    Carbon Dioxide (CO2) 25 22 - 29 mmol/L    Anion Gap 9 7 - 15 mmol/L    Urea Nitrogen 11.1 8.0 - 23.0 mg/dL    Creatinine 0.95 0.67 - 1.17 mg/dL    GFR Estimate 90 >60 mL/min/1.73m2    Calcium 8.9 8.8 - 10.2 mg/dL    Chloride 107 98 - 107 mmol/L    Glucose 105 (H) 70 - 99 mg/dL    Alkaline Phosphatase 73 40 - 150 U/L    AST 23 0 - 45 U/L    ALT 18 0 - 70 U/L    Protein Total 6.2 (L) 6.4 - 8.3 g/dL    Albumin 4.0 3.5 - 5.2 g/dL    Bilirubin Total 0.3 <=1.2 mg/dL   CBC with platelets and differential    Collection Time: 07/15/24  6:28 AM   Result Value Ref Range    WBC Count 3.7 (L) 4.0 - 11.0 10e3/uL    RBC Count 2.72 (L) 4.40 - 5.90 10e6/uL    Hemoglobin 9.3 (L) 13.3 - 17.7 g/dL    Hematocrit 29.1 (L) 40.0 - 53.0 %     (H) 78 - 100 fL    MCH 34.2 (H) 26.5 - 33.0 pg    MCHC 32.0 31.5 - 36.5 g/dL    RDW 16.5 (H) 10.0 - 15.0 %    Platelet Count 147 (L) 150 - 450 10e3/uL    % Neutrophils 69 %    % Lymphocytes 17 %    % Monocytes 13 %    % Eosinophils 0 %    % Basophils 0 %    % Immature Granulocytes 1 %    NRBCs per 100 WBC 0 <1 /100    Absolute Neutrophils 2.6 1.6 - 8.3 10e3/uL    Absolute Lymphocytes 0.6 (L) 0.8 - 5.3 10e3/uL     Absolute Monocytes 0.5 0.0 - 1.3 10e3/uL    Absolute Eosinophils 0.0 0.0 - 0.7 10e3/uL    Absolute Basophils 0.0 0.0 - 0.2 10e3/uL    Absolute Immature Granulocytes 0.0 <=0.4 10e3/uL    Absolute NRBCs 0.0 10e3/uL

## 2024-07-15 NOTE — PROGRESS NOTES
"    [facility] Oncology Followup  Jul 15, 2024    REASON FOR VISIT: Follow-up for metastatic castration-resistant prostate cancer.      INTERVAL HISTORY:   Started cymbalta which is providing relief with neuropathy. The sharp pains and burning sensation are gone. He can also now feel his right shin again.     The first week of chemo cycle he is nauseated with a reduced appetite and more fatigued. Taking compazine scheduled keeps nausea under control, also has Zofran if needed. Appetite and oral intake improve after the first week. Denies any bowel concerns.     Having mild low back pain today, attributes this to riding his e-bike a lot lately which has kept him active.     Left leg swelling is stable, occasionally hurts like his skin is stretching. Scheduled for a fitting for compression later this week. No redness or pain with ambulation.       Review of Systems:  Remainder of 12 point ROS reviewed and otherwise negative except as in interval history.       ONCOLOGY HISTORY: Mr. 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 His oncologic history is detailed below.     1. 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. Prior PSA were 1.4 on 8/16/17, 2.4 on 6/28/16, 2.9 on 6/28/16, and as low as 0.4 on 3/26/2003.   - 1/08/2019: Consultation with Sarah Wilson CNP in Urology clinic. Repeat PSA 9.6.  - 1/16/2019: MRI prostate with contrast - \"This examination is characterized as PIRADS 5- very high probability. Clinically significant cancer is highly likely to be present. There is a large, invasive mass " "arising from the right peripheral zone and extending into the neurovascular bundle, seminal vesicle, and along the anterolateral right mesorectal fascia. Metastatic right external iliac lymph node. Metastatic lesion in the posterior/superior right acetabulum.\"  - 1/25/2019: CT abdomen and pelvis with IV contrast - \"1. Heterogeneous enhancing mass posteriorly in the upper pole of the right kidney measures 4.5 x 5.8 x 5.7 cm (AP by transverse by craniocaudal). It has a small nodular component extending posterior medially which abuts the right psoas muscle. This nodular extension measures 2.1 x 2.1 cm. Minimal stranding about the mass. No definite thrombus within the right renal vein. This renal mass is compatible with a renal cell carcinoma. A paraaortic lymph node situated immediately posterior to the left renal vein measures 1.1 cm in short axis, suspicious for metastasis. 2. A 2 cm right external iliac lymph node is also suspicious for metastases. 3. Multiple sclerotic osseous lesions suspicious for metastases. These include 0.8 and 0.6 cm sclerotic lesions laterally in the right iliac wing (images 53 and 62 respectively). Ill-defined groundglass density laterally in the right acetabulum measuring 1.7 x 1.2 cm corresponds with the lesion identified on MRI. A 0.4 cm groundglass density in the left acetabulum. Sclerotic lesion in the left femoral neck measures 0.9 x 1.6 cm (image 81). Sclerotic metastases would be more compatible with prostate metastases. 4. A 3 subcentimeter hepatic lesions are indeterminate. Metastases would be a consideration. These can be further characterized with liver MRI.\"  - 1/25/2019: NM bone scan - \"There is focal bony uptake in the left femoral neck, right acetabulum, right of midline at the S1 or L5 level of the spine, within multiple bilateral ribs, in the C7 or T1 level of the spine, and anteriorly within the skull. Findings are suspicious for metastases.\"  - 1/31/19: CT chest with " "contrast - \" No suspicious nodules in the chest.  Stable appearance of a 5.8 cm right renal mass concerning for renal carcinoma until proven otherwise.  Stable indeterminant subcentimeter hypodensities in the liver.  Multifocal osteoblastic metastasis including 2.5 cm lesion in the right fifth rib posteriorly and a 1.6 cm lesion in the right third rib posteriorly. No lytic lesions identified.\"  - 2/6/19: CT-guided right sclerotic fifth rib lesion biopsy - \"Metastatic carcinoma, consistent with prostate primary.  Immunohistochemical stains performed show the metastatic carcinoma stains positive for NKX3.1 (prostate marker), negative for STACIE 3 and PAX8, which supports the above diagnosis.\"  - 2/15/19: Started Casodex 50mg every day and consented for the biobanking protocol. 3/18/19 - stopped Casodex.  - 2/19/19: Case discussed in tumor board - recommendation for nephectomy for suspected malignant right renal mass.   - 2/26/19: Started Lupron 22.5mg every 3 months.   - 5/8/19: Started Docetaxel 75mg/m2 IV every 3 weeks. 06/18/19 - cycle 3, 7/10/19 - cycle 4, 07/31/19 - cycle 5, 08/21/19 - cycle 6.   - 10/2/19: Restaging CT CAP and NM Bone Scan showed improved osseous disease, no evidence of recurrent or new metastatic disease.  - 1/5/20: Restaging CT CAP and NM Bone Scan showed stable osseous disease, no evidence of recurrent or new metastatic disease.  - 4/6/20: NM bone scan with slightly improved uptake in known skeletal mets. CT C/A/P with contrast with stable osseous mets, no yusuf enlargement, no new visceral mets etc.  - 04/08/20: Zometa every 3 months.  - 10/5/20: CT C/A/P with contrast and NM bone scan - SD.   - 1/4/21: CT C/A/P with contrast and NM bone scan - SD but slight increase in right 5th rib tracer uptake and in posterior L5 sclerosis. 1/6/21  PSA = 0.29  - 03/29/21: CT C/A/P with contrast - single new right sacral 8mm bone lesion (suspicious), other bone mets stable. No visceral/yusuf disease. " "Nephrectomy site without recurrence. NM bone scan - SD but \"increased uptake associated with the prior lesions of the lower cervical spine, posterior right fourth rib and right L5 posterior arch elements. Otherwise unchanged uptake associated with the proximal left femur and left anterior fourth rib. Similar uptake of the left halux, possibly secondary to degenerative osteoarthritis or gout.\"    3/31/21 PSA = 0.44  7/7/21  PSA = 0.47  11/2021 PSA = 1.05  -- Start XTANDI  12/16/21 PSA =0.22  2/11/22 PSA= 0.50  3/22/22 PSA=0.72  5/5/2022 PSA=1.79  7/11/2022 PSA=2.16 --Start cycle 1 docetaxel   8/22/22 PSA = 1.36  9/12/22 PSA = 1.09  10/24/22 Cycle #6 of Docetaxel. End of treatment  1/9/23  PSA =0.66  3/20/23  PSA=1.54  4/21/23 PSA = 1.81  T=15  5/22/23 C1 Pluvicto   06/07/23          PSA = 1.42  7/18/23 PSA=1.75, C2 Pluvicto   8/28/23 Transfer of care initial visit for Tabby.  PSA 2.06.  C3 Pluvicto scheduled for 8/30.  Proceed with dose as planned.  MR DELORIS femur, ortho referral, PSMA PET ordered for prior to follow-up.    10/2/23 Palliative RT to L femur metastasis complete.  PSMA PET with mixed response.  PSA 2.21.  RT to L femur pending.  Continue with Dose #4 of Pluvicto despite mixed response to therapy.  Dex course for possible pain flare with RT.    11/8/23  Follow-up prior to Dose #5 Pluvicto.  More pain in chest/ribs/back. PSA 4.38.  Testosterone=3.  Rad onc referral for ribs.  Biopsy progressive lesion for progression on Pluvicto.  Cabazitaxel scheduled for follow-up appointment.  Tempus blood testing unrevealing for targetable mutation.    11/27/23 Bx completed from R iliac crest but unrevealing for either PCa or RCC.  Start cabazitaxel C1D1.  PSA 2.81.    12/19/23 PSA = 3.66  1/8/24 PSA = 3.55  1/29/24 PSA = 3.07  2/19/24 PSA= 2.21-Start carboplatin/cabazitaxel.    3/11/24 PSA= C2 Carbo/cabazi.  PSA 1.99.    4/01/24 PSA= 1.74. C3 Carbo/cabazi.   4/22/24 PSA= 1.85. C4 Carbo/cabazi.   5/13/24 PSA = 2.06. C5 " "Carbo/cabazi.   6/3/24 PSA = 2.68, cycle 6 Carboplatin/cabazitaxel  6/24/24 PSA 3.51, C7 carboplatin/cabazitaxel.  Bony lesions stable on NM bone scan.  Reticulonodular densities noted in lungs as possible infection vs drug reaction.  Add chromogranin A to next labs.   7/15/24 PSA= 5.07, chromogranin A pending    Radiation history:   -C7         3,000 cGy       06 X      8/05/2021        8/18/2021        13       10  -2 Sacrum          2,500 cGy       18 X      4/12/2022        4/18/2022         6           -Sacrum retreat               700 cGy        18 X      2/28/2023        2/28/2023  -Left femur to 2000 cGy in 5 fractions, completed 10/10/2023   -bilateral posterior chest wall at an area of osseous involvement of the ribs and adjacent T4 and 5 spine, to be delivered to 2000 cGy in 5 fractions.  completed 12/13-12/19/23.    2. Stage III ([id number]), grade 3 of 4, clear-cell RCC of right kidney:  - Incidentally diagnosed as above.   - Underwent curative-intent robotic right radical nephrectomy with Dr. Wesley Cleveland on 3/19/19. No tumor spillage per op report.   - Path showed: \"Histologic Type: Clear cell renal cell carcinoma; Sarcomatoid Features: Not identified; Histologic Grade: Nucleolar grade 3 (WHO/ISUP). Extent Tumor Size: 4.3 cm. Microscopic Tumor Extension: Tumor extension into renal sinus (in vascular structures). Margins: Negative. Tumor Necrosis: Present; focal. Lymph-Vascular Invasion: Not identified.  Pathologic Staging (pTNM) Primary Tumor (pT): pT3a: Tumor extends into renal vein branches/renal sinus. Regional Lymph Nodes (pN): pNX. Number of Lymph Nodes Examined: 0 Distant Metastasis (pM): pM N/A.\"  - Restaging scans as above.  No current concerns for recurrence.      PAST MEDICAL HISTORY:  Past Medical History:   Diagnosis Date    Cancer of kidney, right (H)     Complication of anesthesia     slow wakeup     Malignant neoplasm of prostate metastatic to bone (H) 2/14/2019    Thyroid disease  "     CURRENT MEDICATIONS:   Current Outpatient Medications:     atorvastatin (LIPITOR) 80 MG tablet, Take 80 mg by mouth daily, Disp: , Rfl:     BERBERINE CHLORIDE PO, , Disp: , Rfl:     calcium citrate-vitamin D (CITRACAL) 315-250 MG-UNIT TABS per tablet, Take 500 mg by mouth 2 times daily With 800 Vd per pt, Disp: , Rfl:     cycloSPORINE (RESTASIS) 0.05 % ophthalmic emulsion, Place 1 drop into both eyes 2 times daily , Disp: , Rfl:     dexAMETHasone (DECADRON) 4 MG tablet, Take 2 tablets (8 mg) by mouth daily Take for 3 days, starting the day after chemo. Take with food., Disp: 6 tablet, Rfl: 2    dexAMETHasone (DECADRON) 4 MG tablet, Take 1 tablet (4 mg) by mouth daily, Disp: 7 tablet, Rfl: 2    DULoxetine (CYMBALTA) 30 MG capsule, Take 2 capsules (60 mg) by mouth daily, Disp: 60 capsule, Rfl: 0    fentaNYL (DURAGESIC) 25 mcg/hr 72 hr patch, Place 1 patch onto the skin every 72 hours remove old patch., Disp: 10 patch, Rfl: 0    furosemide (LASIX) 20 MG tablet, Take 1 tablet (20 mg) by mouth daily, Disp: 15 tablet, Rfl: 1    gabapentin (NEURONTIN) 300 MG capsule, Take 1 capsule (300 mg) by mouth every morning AND 2 capsules (600 mg) daily at 2 pm AND 2 capsules (600 mg) at bedtime., Disp: 150 capsule, Rfl: 3    levothyroxine (SYNTHROID/LEVOTHROID) 112 MCG tablet, Take 112 mcg by mouth daily, Disp: , Rfl:     lidocaine, viscous, (XYLOCAINE) 2 % solution, Swish and swallow 15 mLs in mouth every 3 hours as needed for pain (before meals and at bedtime) ; Max 8 doses/24 hour period., Disp: 100 mL, Rfl: 1    loratadine (CLARITIN) 10 MG tablet, Take 10 mg by mouth daily, Disp: , Rfl:     Multiple Vitamins-Minerals (MULTIVITAMIN ADULT EXTRA C PO), Take 1 tablet by mouth every 24 hours, Disp: , Rfl:     naloxone (NARCAN) 4 MG/0.1ML nasal spray, Spray 1 spray (4 mg) into one nostril alternating nostrils as needed for opioid reversal every 2-3 minutes until assistance arrives, Disp: 0.2 mL, Rfl: 1    Nutritional Supplements  (SALMON OIL) CAPS, Take 2 capsules by mouth daily , Disp: , Rfl:     omeprazole (PRILOSEC) 20 MG DR capsule, Take 20 mg by mouth daily, Disp: , Rfl:     ondansetron (ZOFRAN) 8 MG tablet, Take 1 tablet (8 mg) by mouth every 8 hours as needed for nausea (vomiting), Disp: 30 tablet, Rfl: 2    ondansetron (ZOFRAN) 8 MG tablet, Take 1 tablet (8 mg) by mouth every 8 hours as needed for nausea, Disp: 30 tablet, Rfl: 4    oxyCODONE (ROXICODONE) 5 MG tablet, Take 1-2 tablets (5-10 mg) by mouth every 3 hours as needed for severe pain, Disp: 90 tablet, Rfl: 0    predniSONE (DELTASONE) 5 MG tablet, Take 1 tablet (5 mg) by mouth 2 times daily (with meals) for 21 days, Disp: 42 tablet, Rfl: 0    prochlorperazine (COMPAZINE) 10 MG tablet, Take 1 tablet (10 mg) by mouth every 6 hours as needed for nausea or vomiting, Disp: 30 tablet, Rfl: 5    tamsulosin (FLOMAX) 0.4 MG capsule, Take 1 capsule (0.4 mg) by mouth daily, Disp: 30 capsule, Rfl: 3    triamterene-HCTZ (MAXZIDE-25) 37.5-25 MG tablet, Take 1 tablet by mouth daily, Disp: , Rfl:     Current Facility-Administered Medications:     heparin lock flush 100 unit/mL injection 5 mL, 5 mL, Intracatheter, Q1H TORIBION, Sherry Lee, APRN CNP, 5 mL at 07/15/24 0630      Physical Exam:   /76 (BP Location: Left arm, Patient Position: Sitting, Cuff Size: Adult Large)   Pulse 65   Temp 98.2  F (36.8  C) (Oral)   Resp 16   Wt 113.9 kg (251 lb)   SpO2 95%   BMI 35.01 kg/m    Wt Readings from Last 10 Encounters:   07/24/24 113.4 kg (250 lb)   07/15/24 113.9 kg (251 lb)   07/03/24 114.8 kg (253 lb)   06/24/24 114.7 kg (252 lb 14.4 oz)   06/03/24 116 kg (255 lb 12.8 oz)   05/13/24 117.9 kg (259 lb 14.4 oz)   04/22/24 118.3 kg (260 lb 14.4 oz)   04/01/24 119.4 kg (263 lb 3.2 oz)   03/28/24 116.1 kg (256 lb)   03/11/24 119.3 kg (263 lb 1.6 oz)   General: The patient is a pleasant male in no acute distress.  HEENT: EOMI. Sclerae are anicteric. Mucous membranes moist without lesions or  thrush.   Lymph: Neck is supple with no lymphadenopathy in the cervical or supraclavicular areas.   Heart: Regular rate and rhythm.   Lungs: normal work of breathing on RA, clear to auscultation throughout   Abdomen: Bowel sounds present, soft, non-distended.    Extremities:trace pitting lower extremity edema in the left leg into the thigh, trace right sided edema.   Neuro: Cranial nerves II through XII are grossly intact.  Skin: No rashes, petechiae, or bruising noted on exposed skin.  Psych: affect bright, alert and oriented    LABORATORY DATA:  Most Recent 3 CBC's:  Recent Labs   Lab Test 07/15/24  0628 06/24/24  0754 06/03/24  0752   WBC 3.7* 3.9* 4.1   HGB 9.3* 10.0* 10.0*   * 106* 105*   * 157 139*   ANEUTAUTO 2.6 2.9 2.8     Most Recent 3 BMP's:  Recent Labs   Lab Test 07/15/24  0628 06/24/24  0754 06/03/24  0752    141 140   POTASSIUM 3.9 4.5 3.9   CHLORIDE 107 108* 106   CO2 25 23 24   BUN 11.1 16.0 14.6   CR 0.95 1.01 1.00   ANIONGAP 9 10 10   BETHANY 8.9 8.7* 8.5*   * 108* 130*   PROTTOTAL 6.2* 6.3* 6.2*   ALBUMIN 4.0 4.0 3.9    Most Recent 3 LFT's:  Recent Labs   Lab Test 07/15/24  0628 06/24/24  0754 06/03/24  0752   AST 23 21 22   ALT 18 16 18   ALKPHOS 73 80 73   BILITOTAL 0.3 0.3 0.3     PSA Tumor Marker   Date Value Ref Range Status   07/15/2024 5.07 (H) 0.00 - 4.50 ng/mL Final   06/24/2024 3.51 0.00 - 4.50 ng/mL Final   06/03/2024 2.68 0.00 - 4.50 ng/mL Final   05/13/2024 2.06 0.00 - 4.50 ng/mL Final   04/22/2024 1.85 0.00 - 4.50 ng/mL Final   08/22/2022 1.36 0.00 - 4.00 ug/L Final   08/01/2022 1.78 0.00 - 4.00 ug/L Final   07/11/2022 2.16 0.00 - 4.00 ug/L Final   05/05/2022 1.79 0.00 - 4.00 ug/L Final   03/22/2022 0.72 0.00 - 4.00 ug/L Final         ASSESSMENT & PLAN: Mr. Reyes is a 64 year old gentleman with metastatic castration sensitive prostate adenocarcinoma as well as an incidentally diagnosed stage III clear-cell renal cell carcinoma of the right kidney (s/p  "radical R nephrectomy 3/19/19), who is here prior to cycle 3 cabazitaxel.      1. Metastatic castration-resistant prostate cancer, with involvement of the bones and RPLN:   - Patient met the criteria for CHAARTED \"high-volume\" metastatic hormone-sensitive prostate cancer. He opted for docetaxel in combination with ADT (per JOSEFA, [id number], KARLA). He was subsequently treated with docetaxel x6 doses in the [facility] setting and enzalutamide.  He initiated Pluvicto in May 2023 with an initial slight decline in PSA, but this began to rise just prior to Cycle 3.  Given his XR evidence of progression in L femur and PSA rise, we re-evaluated his progression with PSMA PET scan in September 2023, with note of mixed response.  Canceled Pluvicto Dose for November due to symptomatic progression with PSA rise. PSA is labile. Biopsy results from 11/21 was unrevealing for evaluation of NEPC vs small cell given progression without a significant rise in PSA. Tempus peripheral blood mutation analysis is unrevealing for targetable mutations. Cabazitaxel started 11/27/23 given progression on Pluvicto. Carboplatin added 2/2024 and cabazitaxel dose reduced by 20% in April 2024 due to neuropathy.  Restaging with pulmonary consolidative findings without mass-like features.    -Chromogranin A drawn today, pending.   -Feeling well, no new concerns today.   -Lupron due today, next will be due 10/7/2024  -Cabazitaxel to be continued at 20% dose reduction.     2. Pressure in chest, resolved:  Has an ongoing sensation of pressure/need to cough in his chest, primarily against his sternum. Vital signs stable, negative pulmonary physical exam. No chest pain.     3. Bone metastases:    - Noted to have osseous metastatic disease at the time of diagnosis. S/p radiation to C spine and sacrum (Re-irradiation to sacrum).   - pain mgmt per palliative care, pain is now well controlled with fentanyl patch. Dexamethasone burst is helpful for pain flares " and he has these available as needed.    - RT to L femur 10/2023.  RT for bilateral posterior ribs 12/2023 with Dr. Albert.  - Encouraged to continue calcium and vitamin D supplementation; continue Zometa 4mg IV every ~3 months.   - Last Zometa given 5/20/24.  Next due August 2024.       4. Stage 3, UISS-high risk ccRCC: s/p R nephrectomy   - Nephrectomy 3/19/19 and noted incidentally.  He is now 4.5 years post-op without evidence for recurrence.   - At this point, there is a minimal risk of recurrence of his RCC given the time since surgery without the use of adjuvant immunotherapy. There is no suspicious adenopathy or other features on his most recent imaging studies.    - Will continue to monitor with imaging planned for prostate cancer.      5. Sensory neuropathy, Toes/feet Grade 2  - continue monitor for worsening with cabazitaxel.   - Continue acupuncture once weekly. Patient working with insurance to try to increase to twice a week.   - He is on gabapentin managed by palliative care   - Started duloxetine with improvement in pain/burning sensation and improvement in sensation.     6. Anemia  - likely secondary to chemotherapy. Iron levels adequate on 4/22/24. MCV elevated.  Folate and B12 adequate.  Retic appropriately elevated.      7. Left lower extremity swelling   - US 4/22/24 negative for DVT. CT abdomen pelvis 4/29 without significant or new lymphadenopathy. Continue compression stocking. Following with lymphedema therapy.    PLAN:   1. Proceed with C8 of carboplatin/cabazitaxel.  20% dose reduction of cabazi.  Consider dose reduction of carbo next if neuropathy is worse.    2. Continue acupuncture, gabapentin and duloxetine for neuropathy.     3. Return in 3 weeks for C9.    4. Restaging in 3 months with CT CAP and NM bone scan.  Sooner if new pain or symptoms.          The longitudinal plan of care for the diagnosis(es)/condition(s) as documented were addressed during this visit. Due to the added  complexity in care, I will continue to support Walter in the subsequent management and with ongoing continuity of care.      LATESHA Humphreys CNP

## 2024-07-15 NOTE — Clinical Note
7/15/2024      Walter Reyes  [address] [facility] [id number]      Dear Colleague,    Thank you for referring your patient, Walter Reyes, to the [facility]. Please see a copy of my visit note below.        [facility] Oncology Followup  Jul 15, 2024    REASON FOR VISIT: Follow-up for metastatic castration-resistant prostate cancer.      INTERVAL HISTORY:   Started cymbalta , helping with neuropathy, sharp pain and burning are gone, can now feel right shin again   -nauseated and tired for the first week. Staying on compazine keps under control, has zofran if needed. Reduced appetite first week       -fitting on Wednesday for left leg swelling, sometimes hurts  Low back, maybe from biking      ***  Feels good today.  Notes he has 1 bad week, 1 OK week and 1 good week with each 3 week cycle.  Appetite is OK.  Not getting acupuncture yet due to lack of prior auth from acupuncture team.  He does think it was helpful.  Notes it is mainly in his feet.  Nausea is controlled.  No falls or tripping due to neuropathy.  LLE edema is much better with compression stocking, pending custom compression with lymphedema team.  He has no fevers, chills or cough.  Night sweats/hot flashes are unchanged.  No shortness of breath.  He did have covid about 2 weeks ago, but did not have significant symptoms.      Review of Systems:  Remainder of 12 point ROS reviewed and otherwise negative except as in interval history.     ONCOLOGY HISTORY: Mr. 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 His oncologic history is detailed below.     1. De deja metastatic prostate adenocarcinoma, stage IV (M1b at diagnosis), high-volume, castration-sensitive:  - 12/13/2018: PSA  "found to be elevated to 9.1 ng/mL on a routine follow-up with primary care provider Dr. Naylor at [facility]. Prior PSA were 1.4 on 8/16/17, 2.4 on 6/28/16, 2.9 on 6/28/16, and as low as 0.4 on 3/26/2003.   - 1/08/2019: Consultation with Sarah Wilson CNP in Urology clinic. Repeat PSA 9.6.  - 1/16/2019: MRI prostate with contrast - \"This examination is characterized as PIRADS 5- very high probability. Clinically significant cancer is highly likely to be present. There is a large, invasive mass arising from the right peripheral zone and extending into the neurovascular bundle, seminal vesicle, and along the anterolateral right mesorectal fascia. Metastatic right external iliac lymph node. Metastatic lesion in the posterior/superior right acetabulum.\"  - 1/25/2019: CT abdomen and pelvis with IV contrast - \"1. Heterogeneous enhancing mass posteriorly in the upper pole of the right kidney measures 4.5 x 5.8 x 5.7 cm (AP by transverse by craniocaudal). It has a small nodular component extending posterior medially which abuts the right psoas muscle. This nodular extension measures 2.1 x 2.1 cm. Minimal stranding about the mass. No definite thrombus within the right renal vein. This renal mass is compatible with a renal cell carcinoma. A paraaortic lymph node situated immediately posterior to the left renal vein measures 1.1 cm in short axis, suspicious for metastasis. 2. A 2 cm right external iliac lymph node is also suspicious for metastases. 3. Multiple sclerotic osseous lesions suspicious for metastases. These include 0.8 and 0.6 cm sclerotic lesions laterally in the right iliac wing (images 53 and 62 respectively). Ill-defined groundglass density laterally in the right acetabulum measuring 1.7 x 1.2 cm corresponds with the lesion identified on MRI. A 0.4 cm groundglass density in the left acetabulum. Sclerotic lesion in the left femoral neck measures 0.9 x 1.6 cm (image 81). Sclerotic metastases would be more " "compatible with prostate metastases. 4. A 3 subcentimeter hepatic lesions are indeterminate. Metastases would be a consideration. These can be further characterized with liver MRI.\"  - 1/25/2019: NM bone scan - \"There is focal bony uptake in the left femoral neck, right acetabulum, right of midline at the S1 or L5 level of the spine, within multiple bilateral ribs, in the C7 or T1 level of the spine, and anteriorly within the skull. Findings are suspicious for metastases.\"  - 1/31/19: CT chest with contrast - \" No suspicious nodules in the chest.  Stable appearance of a 5.8 cm right renal mass concerning for renal carcinoma until proven otherwise.  Stable indeterminant subcentimeter hypodensities in the liver.  Multifocal osteoblastic metastasis including 2.5 cm lesion in the right fifth rib posteriorly and a 1.6 cm lesion in the right third rib posteriorly. No lytic lesions identified.\"  - 2/6/19: CT-guided right sclerotic fifth rib lesion biopsy - \"Metastatic carcinoma, consistent with prostate primary.  Immunohistochemical stains performed show the metastatic carcinoma stains positive for NKX3.1 (prostate marker), negative for STACIE 3 and PAX8, which supports the above diagnosis.\"  - 2/15/19: Started Casodex 50mg every day and consented for the biobanking protocol. 3/18/19 - stopped Casodex.  - 2/19/19: Case discussed in tumor board - recommendation for nephectomy for suspected malignant right renal mass.   - 2/26/19: Started Lupron 22.5mg every 3 months.   - 5/8/19: Started Docetaxel 75mg/m2 IV every 3 weeks. 06/18/19 - cycle 3, 7/10/19 - cycle 4, 07/31/19 - cycle 5, 08/21/19 - cycle 6.   - 10/2/19: Restaging CT CAP and NM Bone Scan showed improved osseous disease, no evidence of recurrent or new metastatic disease.  - 1/5/20: Restaging CT CAP and NM Bone Scan showed stable osseous disease, no evidence of recurrent or new metastatic disease.  - 4/6/20: NM bone scan with slightly improved uptake in known skeletal " "mets. CT C/A/P with contrast with stable osseous mets, no yusuf enlargement, no new visceral mets etc.  - 04/08/20: Zometa every 3 months.  - 10/5/20: CT C/A/P with contrast and NM bone scan - SD.   - 1/4/21: CT C/A/P with contrast and NM bone scan - SD but slight increase in right 5th rib tracer uptake and in posterior L5 sclerosis. 1/6/21  PSA = 0.29  - 03/29/21: CT C/A/P with contrast - single new right sacral 8mm bone lesion (suspicious), other bone mets stable. No visceral/yusuf disease. Nephrectomy site without recurrence. NM bone scan - SD but \"increased uptake associated with the prior lesions of the lower cervical spine, posterior right fourth rib and right L5 posterior arch elements. Otherwise unchanged uptake associated with the proximal left femur and left anterior fourth rib. Similar uptake of the left halux, possibly secondary to degenerative osteoarthritis or gout.\"    3/31/21 PSA = 0.44  7/7/21  PSA = 0.47  11/2021 PSA = 1.05  -- Start XTANDI  12/16/21 PSA =0.22  2/11/22 PSA= 0.50  3/22/22 PSA=0.72  5/5/2022 PSA=1.79  7/11/2022 PSA=2.16 --Start cycle 1 docetaxel   8/22/22 PSA = 1.36  9/12/22 PSA = 1.09  10/24/22 Cycle #6 of Docetaxel. End of treatment  1/9/23  PSA =0.66  3/20/23  PSA=1.54  4/21/23 PSA = 1.81  T=15  5/22/23 C1 Pluvicto   06/07/23          PSA = 1.42  7/18/23 PSA=1.75, C2 Pluvicto   8/28/23 Transfer of care initial visit for Tabby.  PSA 2.06.  C3 Pluvicto scheduled for 8/30.  Proceed with dose as planned.  MR DELORIS femur, ortho referral, PSMA PET ordered for prior to follow-up.    10/2/23 Palliative RT to L femur metastasis complete.  PSMA PET with mixed response.  PSA 2.21.  RT to L femur pending.  Continue with Dose #4 of Pluvicto despite mixed response to therapy.  Dex course for possible pain flare with RT.    11/8/23  Follow-up prior to Dose #5 Pluvicto.  More pain in chest/ribs/back. PSA 4.38.  Testosterone=3.  Rad onc referral for ribs.  Biopsy progressive lesion for progression on " "Pluvicto.  Cabazitaxel scheduled for follow-up appointment.  Tempus blood testing unrevealing for targetable mutation.    11/27/23 Bx completed from R iliac crest but unrevealing for either PCa or RCC.  Start cabazitaxel C1D1.  PSA 2.81.    12/19/23 PSA = 3.66  1/8/24 PSA = 3.55  1/29/24 PSA = 3.07  2/19/24 PSA= 2.21-Start carboplatin/cabazitaxel.    3/11/24 PSA= C2 Carbo/cabazi.  PSA 1.99.    4/01/24 PSA= 1.74. C3 Carbo/cabazi.   4/22/24 PSA= 1.85. C4 Carbo/cabazi.   5/13/24 PSA = 2.06. C5 Carbo/cabazi.   6/3/24 PSA = 2.68, cycle 6 Carboplatin/cabazitaxel  6/24/24 PSA 3.51, C7 carboplatin/cabazitaxel.  Bony lesions stable on NM bone scan.  Reticulonodular densities noted in lungs as possible infection vs drug reaction.  Add chromogranin A to next labs.     Radiation history:   -C7         3,000 cGy       06 X      8/05/2021        8/18/2021        13       10  -2 Sacrum          2,500 cGy       18 X      4/12/2022        4/18/2022         6           -Sacrum retreat               700 cGy        18 X      2/28/2023        2/28/2023  -Left femur to 2000 cGy in 5 fractions, completed 10/10/2023   -bilateral posterior chest wall at an area of osseous involvement of the ribs and adjacent T4 and 5 spine, to be delivered to 2000 cGy in 5 fractions.  completed 12/13-12/19/23.    2. Stage III ([id number]), grade 3 of 4, clear-cell RCC of right kidney:  - Incidentally diagnosed as above.   - Underwent curative-intent robotic right radical nephrectomy with Dr. Wesley Cleveland on 3/19/19. No tumor spillage per op report.   - Path showed: \"Histologic Type: Clear cell renal cell carcinoma; Sarcomatoid Features: Not identified; Histologic Grade: Nucleolar grade 3 (WHO/ISUP). Extent Tumor Size: 4.3 cm. Microscopic Tumor Extension: Tumor extension into renal sinus (in vascular structures). Margins: Negative. Tumor Necrosis: Present; focal. Lymph-Vascular Invasion: Not identified.  Pathologic Staging (pTNM) Primary Tumor (pT): pT3a: " "Tumor extends into renal vein branches/renal sinus. Regional Lymph Nodes (pN): pNX. Number of Lymph Nodes Examined: 0 Distant Metastasis (pM): pM N/A.\"  - Restaging scans as above.  No current concerns for recurrence.      PAST MEDICAL HISTORY:  Past Medical History:   Diagnosis Date    Cancer of kidney, right (H)     Complication of anesthesia     slow wakeup     Malignant neoplasm of prostate metastatic to bone (H) 2/14/2019    Thyroid disease      CURRENT MEDICATIONS:   Current Outpatient Medications:     atorvastatin (LIPITOR) 20 MG tablet, Take 60 mg by mouth daily , Disp: , Rfl:     BERBERINE CHLORIDE PO, , Disp: , Rfl:     calcium citrate-vitamin D (CITRACAL) 315-250 MG-UNIT TABS per tablet, Take 500 mg by mouth 2 times daily With 800 Vd per pt, Disp: , Rfl:     cycloSPORINE (RESTASIS) 0.05 % ophthalmic emulsion, Place 1 drop into both eyes 2 times daily , Disp: , Rfl:     dexAMETHasone (DECADRON) 4 MG tablet, Take 2 tablets (8 mg) by mouth daily Take for 3 days, starting the day after chemo. Take with food., Disp: 6 tablet, Rfl: 2    dexAMETHasone (DECADRON) 4 MG tablet, Take 1 tablet (4 mg) by mouth daily, Disp: 7 tablet, Rfl: 2    DULoxetine (CYMBALTA) 30 MG capsule, Take 1 capsule (30 mg) by mouth daily, Disp: 30 capsule, Rfl: 1    fentaNYL (DURAGESIC) 25 mcg/hr 72 hr patch, Place 1 patch onto the skin every 72 hours remove old patch., Disp: 10 patch, Rfl: 0    furosemide (LASIX) 20 MG tablet, Take 1 tablet (20 mg) by mouth daily, Disp: 15 tablet, Rfl: 1    gabapentin (NEURONTIN) 300 MG capsule, Take 1 capsule (300 mg) by mouth every morning AND 2 capsules (600 mg) daily at 2 pm AND 2 capsules (600 mg) at bedtime., Disp: 150 capsule, Rfl: 3    levothyroxine (SYNTHROID/LEVOTHROID) 112 MCG tablet, Take 112 mcg by mouth daily, Disp: , Rfl:     lidocaine, viscous, (XYLOCAINE) 2 % solution, Swish and swallow 15 mLs in mouth every 3 hours as needed for pain (before meals and at bedtime) ; Max 8 doses/24 hour " period., Disp: 100 mL, Rfl: 1    loratadine (CLARITIN) 10 MG tablet, Take 10 mg by mouth daily, Disp: , Rfl:     Multiple Vitamins-Minerals (MULTIVITAMIN ADULT EXTRA C PO), Take 1 tablet by mouth every 24 hours, Disp: , Rfl:     naloxone (NARCAN) 4 MG/0.1ML nasal spray, Spray 1 spray (4 mg) into one nostril alternating nostrils as needed for opioid reversal every 2-3 minutes until assistance arrives, Disp: 0.2 mL, Rfl: 1    Nutritional Supplements (SALMON OIL) CAPS, Take 2 capsules by mouth daily , Disp: , Rfl:     omeprazole (PRILOSEC) 20 MG DR capsule, Take 20 mg by mouth daily, Disp: , Rfl:     ondansetron (ZOFRAN) 8 MG tablet, Take 1 tablet (8 mg) by mouth every 8 hours as needed for nausea (vomiting), Disp: 30 tablet, Rfl: 2    ondansetron (ZOFRAN) 8 MG tablet, Take 1 tablet (8 mg) by mouth every 8 hours as needed for nausea, Disp: 30 tablet, Rfl: 4    oxyCODONE (ROXICODONE) 5 MG tablet, Take 1-2 tablets (5-10 mg) by mouth every 3 hours as needed for severe pain, Disp: 90 tablet, Rfl: 0    predniSONE (DELTASONE) 5 MG tablet, Take 1 tablet (5 mg) by mouth 2 times daily (with meals) for 21 days, Disp: 42 tablet, Rfl: 0    prochlorperazine (COMPAZINE) 10 MG tablet, Take 1 tablet (10 mg) by mouth every 6 hours as needed for nausea or vomiting, Disp: 30 tablet, Rfl: 5    tamsulosin (FLOMAX) 0.4 MG capsule, Take 1 capsule (0.4 mg) by mouth daily, Disp: 30 capsule, Rfl: 3    triamterene-HCTZ (MAXZIDE-25) 37.5-25 MG tablet, Take 1 tablet by mouth daily, Disp: , Rfl:       Physical Exam:   There were no vitals taken for this visit.  Wt Readings from Last 10 Encounters:   07/03/24 114.8 kg (253 lb)   06/24/24 114.7 kg (252 lb 14.4 oz)   06/03/24 116 kg (255 lb 12.8 oz)   05/13/24 117.9 kg (259 lb 14.4 oz)   04/22/24 118.3 kg (260 lb 14.4 oz)   04/01/24 119.4 kg (263 lb 3.2 oz)   03/28/24 116.1 kg (256 lb)   03/11/24 119.3 kg (263 lb 1.6 oz)   02/19/24 121.2 kg (267 lb 4.8 oz)   02/13/24 121.4 kg (267 lb 11.2 oz)    General: The patient is a pleasant male in no acute distress.  HEENT: EOMI. Sclerae are anicteric. Mucous membranes moist without lesions or thrush.   Lymph: Neck is supple with no lymphadenopathy in the cervical or supraclavicular areas.   Heart: Regular rate and rhythm.   Lungs: normal work of breathing on RA.    Abdomen: Bowel sounds present, soft, non-distended.    Extremities:trace pitting lower extremity edema in the left leg into the thigh, trace right sided edema.   Neuro: Cranial nerves II through XII are grossly intact.  Skin: No rashes, petechiae, or bruising noted on exposed skin.  Psych: affect bright, alert and oriented    LABORATORY DATA:  Most Recent 3 CBC's:  Recent Labs   Lab Test 06/24/24  0754 06/03/24  0752 05/13/24  0916   WBC 3.9* 4.1 4.7   HGB 10.0* 10.0* 9.7*   * 105* 102*    139* 155   ANEUTAUTO 2.9 2.8 3.3     Most Recent 3 BMP's:  Recent Labs   Lab Test 06/24/24 0754 06/03/24 0752 05/22/24  0915 05/13/24  0916    140 140 142   POTASSIUM 4.5 3.9 4.1 3.9   CHLORIDE 108* 106 104 109*   CO2 23 24 23 23   BUN 16.0 14.6 12.4 15.3   CR 1.01 1.00 0.89 0.95   ANIONGAP 10 10 13 10   BETHANY 8.7* 8.5* 8.5* 8.6*   * 130* 97 111*   PROTTOTAL 6.3* 6.2*  --  6.1*   ALBUMIN 4.0 3.9  --  4.0    Most Recent 3 LFT's:  Recent Labs   Lab Test 06/24/24 0754 06/03/24  0752 05/13/24  0916   AST 21 22 21   ALT 16 18 19   ALKPHOS 80 73 76   BILITOTAL 0.3 0.3 0.3      Latest Reference Range & Units 02/19/24 11:03 03/11/24 08:49 04/01/24 09:30 04/22/24 09:09 05/13/24 09:16   PSA Tumor Marker 0.00 - 4.50 ng/mL 2.21 1.99 1.74 1.85 2.06        CT CAP 6/20/24: Reviewed.  No evidence for new adenopathy, lung findings drug or infection related.      NM bone scan from 6/20/24 overall stable.      ASSESSMENT & PLAN: Mr. Reyes is a 64 year old gentleman with metastatic castration sensitive prostate adenocarcinoma as well as an incidentally diagnosed stage III clear-cell renal cell  "carcinoma of the right kidney (s/p radical R nephrectomy 3/19/19), who is here prior to cycle 3 cabazitaxel.      1. Metastatic castration-resistant prostate cancer, with involvement of the bones and RPLN:   - Patient met the criteria for CHAARTED \"high-volume\" metastatic hormone-sensitive prostate cancer. He opted for docetaxel in combination with ADT (per JOSEFA, [id number], STAMPEDE). He was subsequently treated with docetaxel x6 doses in the [facility] setting and enzalutamide.  He initiated Pluvicto in May 2023 with an initial slight decline in PSA, but this began to rise just prior to Cycle 3.  Given his XR evidence of progression in L femur and PSA rise, we re-evaluated his progression with PSMA PET scan in September 2023, with note of mixed response.  Canceled Pluvicto Dose for November due to symptomatic progression with PSA rise. PSA is labile. Biopsy results from 11/21 was unrevealing for evaluation of NEPC vs small cell given progression without a significant rise in PSA. Tempus peripheral blood mutation analysis is unrevealing for targetable mutations. Cabazitaxel started 11/27/23 given progression on Pluvicto. Carboplatin added 2/2024 and cabazitaxel dose reduced by 20% in April 2024 due to neuropathy.  Restaging with pulmonary consolidative findings without mass-like features.  Will add chromogranin A to next labs and infectious workup.  He was otherwise asymptomatic from an infection standpoint.    -Lupron next due 7/2024. ***  -Cycle 7 to remain at 20% reduced ***    2. Pressure in chest, resolved:  Has an ongoing sensation of pressure/need to cough in his chest, primarily against his sternum. Vital signs stable, negative pulmonary physical exam. No chest pain.     3. Bone metastases:    - Noted to have osseous metastatic disease at the time of diagnosis. S/p radiation to C spine and sacrum (Re-irradiation to sacrum).   - pain mgmt per palliative care, pain is now well controlled with fentanyl patch. " Dexamethasone burst is helpful for pain flares and he has these available as needed.    - RT to L femur 10/2023.  RT for bilateral posterior ribs 12/2023 with Dr. Albert.  - Encouraged to continue calcium and vitamin D supplementation; continue Zometa 4mg IV every ~3 months.   - Last Zometa given 5/20/24.  Next due August 2024.       4. Stage 3, UISS-high risk ccRCC: s/p R nephrectomy   - Nephrectomy 3/19/19 and noted incidentally.  He is now 4.5 years post-op without evidence for recurrence.   - At this point, there is a minimal risk of recurrence of his RCC given the time since surgery without the use of adjuvant immunotherapy. There is no suspicious adenopathy or other features on his most recent imaging studies.    - Will continue to monitor with imaging planned for prostate cancer.      5. Sensory neuropathy, Toes/feet Grade 2  - continue monitor for worsening with cabazitaxel.   - Continue acupuncture once weekly. Patient working with insurance to try to increase to twice a week.   - He is on gabapentin managed by palliative care 300 mg in the am, 300 mg in the afternoon, and 600 mg at HS. Discussed with Walter increasing his afternoon dose of gabapentin from 300 mg to 600 mg. He will try this and he will update palliative care as well.      6. Anemia  - likely secondary to chemotherapy. Iron levels adequate on 4/22/24. MCV elevated.  Folate and B12 adequate.  Retic appropriately elevated.      7. Left lower extremity swelling   - US 4/22/24 negative for DVT. CT abdomen pelvis 4/29 without significant or new lymphadenopathy. Continue compression stocking. Following with lymphedema therapy, has a lymphatic massage scheduled next week.      PLAN:   1. Proceed with C*** of carboplatin/cabazitaxel.  20% dose reduction of cabazi.  Consider dose reduction of carbo next if neuropathy is worse. ***   2. ***   3. Return in 3 weeks for C9.    4. Restaging in 3 months with CT CAP and NM bone scan.  Sooner if new pain or  symptoms.          The longitudinal plan of care for the diagnosis(es)/condition(s) as documented were addressed during this visit. Due to the added complexity in care, I will continue to support Walter in the subsequent management and with ongoing continuity of care.      *** minutes spent on the date of the encounter doing chart review, review of test results, interpretation of tests, patient visit, and documentation     LATESHA Humphreys CNP            Again, thank you for allowing me to participate in the care of your patient.        Sincerely,        LATESHA Humphreys CNP

## 2024-07-15 NOTE — NURSING NOTE
Chief Complaint   Patient presents with    Port Draw     Vitals taken, port accessed, labs drawn, heparin locked, checked into next appt     /76 (BP Location: Left arm, Patient Position: Sitting, Cuff Size: Adult Large)   Pulse 65   Temp 98.2  F (36.8  C) (Oral)   Resp 16   Wt 113.9 kg (251 lb)   SpO2 95%   BMI 35.01 kg/m    Neto Felix RN on 7/15/2024 at 6:33 AM

## 2024-07-15 NOTE — NURSING NOTE
"Oncology Rooming Note    July 15, 2024 7:07 AM   Walter Reyes is a 64 year old male who presents for:    Chief Complaint   Patient presents with    Port Draw     Vitals taken, port accessed, labs drawn, heparin locked, checked into next appt    Oncology Clinic Visit     Metastatic Prostate Cancer     Initial Vitals: /76 (BP Location: Left arm, Patient Position: Sitting, Cuff Size: Adult Large)   Pulse 65   Temp 98.2  F (36.8  C) (Oral)   Resp 16   Wt 113.9 kg (251 lb)   SpO2 95%   BMI 35.01 kg/m   Estimated body mass index is 35.01 kg/m  as calculated from the following:    Height as of 7/3/24: 1.803 m (5' 11\").    Weight as of this encounter: 113.9 kg (251 lb). Body surface area is 2.39 meters squared.  No Pain (1) Comment: left thigh   No LMP for male patient.  Allergies reviewed: Yes  Medications reviewed: Yes    Medications: Medication refills not needed today.  Pharmacy name entered into EPIC:    [facility] [place], [place] - [address] [facility] - [place], [place] - [phone] [facility] [place], [place] - [phone]  [facility] [place], [place] - [address] [address]    Frailty Screening:   Is the patient here for a new oncology consult visit in cancer care? 2. No      Clinical concerns: None       Meena See LPN  7/15/2024                "

## 2024-07-15 NOTE — PROGRESS NOTES
Infusion Nursing Note:  Walter Reyes presents today for C8 D1 cabazitaxel + carboplatin & C11 D1 Lupron. Neulasta On-Pro    Patient seen by provider today: Yes: Sherry Lee   present during visit today: Not Applicable.    Note: Patient presents to the infusion center feeling well today. Does not offer any new questions or concerns following provider visit today.       Intravenous Access:  Implanted Port.    Treatment Conditions:  Lab Results   Component Value Date    HGB 9.3 (L) 07/15/2024    WBC 3.7 (L) 07/15/2024    ANEU 5.0 07/07/2021    ANEUTAUTO 2.6 07/15/2024     (L) 07/15/2024        Lab Results   Component Value Date     07/15/2024    POTASSIUM 3.9 07/15/2024    MAG 1.9 08/25/2023    CR 0.95 07/15/2024    BETHANY 8.9 07/15/2024    BILITOTAL 0.3 07/15/2024    ALBUMIN 4.0 07/15/2024    ALT 18 07/15/2024    AST 23 07/15/2024       Results reviewed, labs MET treatment parameters, ok to proceed with treatment.      Post Infusion Assessment:  Patient tolerated infusion without incident.  Blood return noted pre and post infusion.  Site patent and intact, free from redness, edema or discomfort.  No evidence of extravasations.  Access discontinued per protocol.     ONPRO  Was placed on patient's: left side of abdomen.    Was placed at 1130 AM    Podpal used: Yes    ONPRO injector device Lot number: [id number]    Patient education included: what patient can expect after application, what colored lights mean on the device, when to remove device, when and where to call with questions or issues, all patients questions answered, and that Neulasta administration will occur at 1430 on 07/16/2024.    Patient tolerated administration well.    Discharge Plan:   Prescription refills given for prednisone.  Discharge instructions reviewed with: Patient and Family.  Patient and/or family verbalized understanding of discharge instructions and all questions answered.  AVS to patient via [employer].  Patient  will return 08/05/2024 for next appointment.   Patient discharged in stable condition accompanied by: self and wife.  Departure Mode: Ambulatory.      Sonia Ratliff RN

## 2024-07-16 LAB — TESTOST SERPL-MCNC: <2 NG/DL (ref 240–950)

## 2024-07-17 LAB — CGA SERPL-MCNC: 271 NG/ML

## 2024-07-24 ENCOUNTER — VIRTUAL VISIT (OUTPATIENT)
Dept: PALLIATIVE MEDICINE | Facility: CLINIC | Age: 64
End: 2024-07-24
Attending: NURSE PRACTITIONER
Payer: COMMERCIAL

## 2024-07-24 VITALS
DIASTOLIC BLOOD PRESSURE: 73 MMHG | WEIGHT: 250 LBS | SYSTOLIC BLOOD PRESSURE: 113 MMHG | BODY MASS INDEX: 33.86 KG/M2 | HEIGHT: 72 IN

## 2024-07-24 DIAGNOSIS — G89.3 CANCER ASSOCIATED PAIN: ICD-10-CM

## 2024-07-24 DIAGNOSIS — C61 PROSTATE CANCER (H): ICD-10-CM

## 2024-07-24 DIAGNOSIS — M54.10 RADICULOPATHY, UNSPECIFIED SPINAL REGION: ICD-10-CM

## 2024-07-24 DIAGNOSIS — G63 NEUROPATHY ASSOCIATED WITH CANCER (H): ICD-10-CM

## 2024-07-24 DIAGNOSIS — C80.1 NEUROPATHY ASSOCIATED WITH CANCER (H): ICD-10-CM

## 2024-07-24 PROCEDURE — 99215 OFFICE O/P EST HI 40 MIN: CPT | Mod: 95 | Performed by: NURSE PRACTITIONER

## 2024-07-24 RX ORDER — FENTANYL 25 UG/1
1 PATCH TRANSDERMAL
Qty: 10 PATCH | Refills: 0 | Status: SHIPPED | OUTPATIENT
Start: 2024-07-24 | End: 2024-09-04

## 2024-07-24 RX ORDER — DULOXETIN HYDROCHLORIDE 30 MG/1
60 CAPSULE, DELAYED RELEASE ORAL DAILY
Qty: 60 CAPSULE | Refills: 0 | Status: SHIPPED | OUTPATIENT
Start: 2024-07-24 | End: 2024-08-26

## 2024-07-24 ASSESSMENT — PAIN SCALES - GENERAL: PAINLEVEL: NO PAIN (0)

## 2024-07-24 NOTE — NURSING NOTE
Current patient location: [address] [place] [id number]    Is the patient currently in the state of [place]? YES    Visit mode:VIDEO    If the visit is dropped, the patient can be reconnected by: VIDEO VISIT: Text to cell phone:   Telephone Information:   Mobile [phone]       Will anyone else be joining the visit? NO  (If patient encounters technical issues they should call [phone]    How would you like to obtain your AVS? MyChart    Are changes needed to the allergy or medication list? Pt stated no changes to allergies and Pt stated no med changes    Are refills needed on medications prescribed by this physician?     Reason for visit: MERCED MOREIRAF

## 2024-07-24 NOTE — PATIENT INSTRUCTIONS
Thank you for meeting with us in the [facility].    How to get a hold of us:  For non-urgent matters, MyChart works best.    For more urgent matters, or if you prefer not to use MyChart, call our clinic nurse coordinator Sherry Palma RN at [phone] or [phone]    We have an on-call number for evenings and weekends. Please call this only if you are having uncontrolled symptoms or serious side effects from your medicines: [phone].     For refills, please give us a week (5 working days) notice. We don't always have providers available everyday to do refills. If you call the day you run out of your medicine, we may not be able to refill it in time, so call 5 days in advance!

## 2024-07-24 NOTE — PROGRESS NOTES
Virtual Visit Details    Type of service:  Video Visit     Originating Location (pt. Location): Home    Distant Location (provider location):  On-site  Platform used for Video Visit: [facility]      Palliative Care Outpatient Clinic      Patient ID/Chief Complaint: Walter Reyes 63 year old male who is presenting to the palliative medicine clinic today at the request of Yamilet Espinoza PA-C for a palliative care follow-up secondary to metastatic prostate cancer.   The patient's primary care provider is:  Poncho Ortiz       Impression & Recommendations:  63-year-old male with metastatic prostate cancer with spinal and bone metastases.  Palliative care assistance requested for symptom management of chronic cancer associated back pain with radiculopathy in the legs.    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  High risk for recurrence.    Past medical history also includes hyperlipidemia, hypothyroidism, HTN, GERD, Acosta's neuroma, plantar fasciitis, peroneal tendinitis, and mild chemotherapy-induced polyneuropathy.    Radiculopathy in both legs, has improved on gabapentin.  He has constant pain over the sacrum and to the left of the sacrum.  Pain is constant, deep, aching.  Interferes with sleep. Pain worsens when reclining or lying down. Due to progressive cancer, he is also having more rib and back pain. He is not seeking RTX at this time, rather opting for medication management.     Recent worsening and chemotherapy-induced polyneuropathy.    Symptoms/recommendations/discussion:  Healthcare directive present in EMR. Wife Micheline is agent if needed.   Continue fentanyl patch 25 mcg/h.   Continue gabapentin 300 mg every morning, 600 mg afternoon, 600 mg at bedtime.  Recent increase in gabapentin due to chemotherapy-induced polyneuropathy.  INcrease Cymbalta to 60 mg daily for neuropathy and mood (30 mg was well tolerated and helped with  CIPN).  Receiving acupuncture as well.  Continue oxycodone IR 5-10 mg every 4 hours as needed--- generally takes only before bed.  Counseled to monitor for constipation  Counseled opioid safety, Narcan nasal spray ordered  Social situation includes residing in home with wife Micheline. Stable housing and food. No h/o substance abuse.   Palliative follow-up in 2 months. He has direct phone # for our team.         How to get a hold of us:  For non-urgent matters, MyChart works best.    For more urgent matters, or if you prefer not to use MyChart, call our clinic nurse coordinator Sherry Palma RN at [phone] or [phone]    We have an on-call number for evenings and weekends. Please call this only if you are having uncontrolled symptoms or serious side effects from your medicines: [phone].     For refills, please give us a week (5 working days) notice. We don't always have providers available everyday to do refills. If you call the day you run out of your medicine, we may not be able to refill it in time, so call 5 days in advance        History:  History gathered today from: patient, family/loved ones, medical chart, outside records including Care Everywhere      PE: /73   Ht 1.829 m (6')   Wt 113.4 kg (250 lb)   BMI 33.91 kg/m     Wt Readings from Last 3 Encounters:   07/24/24 113.4 kg (250 lb)   07/15/24 113.9 kg (251 lb)   07/03/24 114.8 kg (253 lb)       Gen alert, comfortable appearing, NAD.   Head NCAT.  Eyes anicteric without injection  Face symmetric, eyes conjugate  Lungs unlabored, no cough, speaking full sentences  Skin no rashes or lesions evident on face/neck  Neuro Face symmetric, eyes conjugate; speech fluent.  Neuropsych exam normal including affect, sensorium, gross memory, thought processes, and fund of knowledge.         Data reviewed:  I reviewed electrolytes, BUN/creatinine, liver profile, hemoglobin and hematocrit, platelet count, and most recent imaging  Recent oncology  notes     database reviewed: 7/2        45 minutes spent on the date of the encounter doing chart review, history and exam, patient education & counseling, documentation and other activities as noted above.        Thank you for involving us in the patient's care.   LATESHA Seaman, [facility]

## 2024-08-04 NOTE — PROGRESS NOTES
[facility] Oncology Followup  Aug 5, 2024    REASON FOR VISIT: Follow-up for metastatic castration-resistant prostate cancer.      INTERVAL HISTORY:   Increased Cymbalta which provided further relief with his neuropathy right away. He tried weaning off of gabapentin and started having more rib, back and leg pain. He has increased his gabapentin dose back up to where it was and plans to follow up with palliative care team.     He continues to ride his bike often and remains active.     He is fatigued for a few days following chemo. Last cycle, he slept the entire day on day 4, but then felt recovered that night, which is a lot sooner than with previous cycles.     Taking compazine regularly for 3-4 days which controls his nausea, has zofran if needed. Appetite is still decreased, he is eating smaller amounts and trying to eat more frequently. No bowel or bladder concerns.     Lower extremity swelling is improved. Received his custom compression garments and that has been helpful in reducing swelling, but also decreasing discomfort and increasing his strength.     He denies any new concerns today.       Review of Systems:  Remainder of 12 point ROS reviewed and otherwise negative except as in interval history.       ONCOLOGY HISTORY: Mr. 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 His oncologic history is detailed below.     1. 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 Naylor at [facility]. Prior PSA were 1.4 on 8/16/17, 2.4 on 6/28/16, 2.9 on 6/28/16, and as low as 0.4 on 3/26/2003.   - 1/08/2019: Consultation with Sarah  "GIOVANNA Wilson in Urology clinic. Repeat PSA 9.6.  - 1/16/2019: MRI prostate with contrast - \"This examination is characterized as PIRADS 5- very high probability. Clinically significant cancer is highly likely to be present. There is a large, invasive mass arising from the right peripheral zone and extending into the neurovascular bundle, seminal vesicle, and along the anterolateral right mesorectal fascia. Metastatic right external iliac lymph node. Metastatic lesion in the posterior/superior right acetabulum.\"  - 1/25/2019: CT abdomen and pelvis with IV contrast - \"1. Heterogeneous enhancing mass posteriorly in the upper pole of the right kidney measures 4.5 x 5.8 x 5.7 cm (AP by transverse by craniocaudal). It has a small nodular component extending posterior medially which abuts the right psoas muscle. This nodular extension measures 2.1 x 2.1 cm. Minimal stranding about the mass. No definite thrombus within the right renal vein. This renal mass is compatible with a renal cell carcinoma. A paraaortic lymph node situated immediately posterior to the left renal vein measures 1.1 cm in short axis, suspicious for metastasis. 2. A 2 cm right external iliac lymph node is also suspicious for metastases. 3. Multiple sclerotic osseous lesions suspicious for metastases. These include 0.8 and 0.6 cm sclerotic lesions laterally in the right iliac wing (images 53 and 62 respectively). Ill-defined groundglass density laterally in the right acetabulum measuring 1.7 x 1.2 cm corresponds with the lesion identified on MRI. A 0.4 cm groundglass density in the left acetabulum. Sclerotic lesion in the left femoral neck measures 0.9 x 1.6 cm (image 81). Sclerotic metastases would be more compatible with prostate metastases. 4. A 3 subcentimeter hepatic lesions are indeterminate. Metastases would be a consideration. These can be further characterized with liver MRI.\"  - 1/25/2019: NM bone scan - \"There is focal bony uptake in the " "left femoral neck, right acetabulum, right of midline at the S1 or L5 level of the spine, within multiple bilateral ribs, in the C7 or T1 level of the spine, and anteriorly within the skull. Findings are suspicious for metastases.\"  - 1/31/19: CT chest with contrast - \" No suspicious nodules in the chest.  Stable appearance of a 5.8 cm right renal mass concerning for renal carcinoma until proven otherwise.  Stable indeterminant subcentimeter hypodensities in the liver.  Multifocal osteoblastic metastasis including 2.5 cm lesion in the right fifth rib posteriorly and a 1.6 cm lesion in the right third rib posteriorly. No lytic lesions identified.\"  - 2/6/19: CT-guided right sclerotic fifth rib lesion biopsy - \"Metastatic carcinoma, consistent with prostate primary.  Immunohistochemical stains performed show the metastatic carcinoma stains positive for NKX3.1 (prostate marker), negative for STACIE 3 and PAX8, which supports the above diagnosis.\"  - 2/15/19: Started Casodex 50mg every day and consented for the biobanking protocol. 3/18/19 - stopped Casodex.  - 2/19/19: Case discussed in tumor board - recommendation for nephectomy for suspected malignant right renal mass.   - 2/26/19: Started Lupron 22.5mg every 3 months.   - 5/8/19: Started Docetaxel 75mg/m2 IV every 3 weeks. 06/18/19 - cycle 3, 7/10/19 - cycle 4, 07/31/19 - cycle 5, 08/21/19 - cycle 6.   - 10/2/19: Restaging CT CAP and NM Bone Scan showed improved osseous disease, no evidence of recurrent or new metastatic disease.  - 1/5/20: Restaging CT CAP and NM Bone Scan showed stable osseous disease, no evidence of recurrent or new metastatic disease.  - 4/6/20: NM bone scan with slightly improved uptake in known skeletal mets. CT C/A/P with contrast with stable osseous mets, no yusuf enlargement, no new visceral mets etc.  - 04/08/20: Zometa every 3 months.  - 10/5/20: CT C/A/P with contrast and NM bone scan - SD.   - 1/4/21: CT C/A/P with contrast and NM bone " "scan - SD but slight increase in right 5th rib tracer uptake and in posterior L5 sclerosis. 1/6/21  PSA = 0.29  - 03/29/21: CT C/A/P with contrast - single new right sacral 8mm bone lesion (suspicious), other bone mets stable. No visceral/yusuf disease. Nephrectomy site without recurrence. NM bone scan - SD but \"increased uptake associated with the prior lesions of the lower cervical spine, posterior right fourth rib and right L5 posterior arch elements. Otherwise unchanged uptake associated with the proximal left femur and left anterior fourth rib. Similar uptake of the left halux, possibly secondary to degenerative osteoarthritis or gout.\"    3/31/21 PSA = 0.44  7/7/21  PSA = 0.47  11/2021 PSA = 1.05  -- Start XTANDI  12/16/21 PSA =0.22  2/11/22 PSA= 0.50  3/22/22 PSA=0.72  5/5/2022 PSA=1.79  7/11/2022 PSA=2.16 --Start cycle 1 docetaxel   8/22/22 PSA = 1.36  9/12/22 PSA = 1.09  10/24/22 Cycle #6 of Docetaxel. End of treatment  1/9/23  PSA =0.66  3/20/23  PSA=1.54  4/21/23 PSA = 1.81  T=15  5/22/23 C1 Pluvicto   06/07/23          PSA = 1.42  7/18/23 PSA=1.75, C2 Pluvicto   8/28/23 Transfer of care initial visit for Tabby.  PSA 2.06.  C3 Pluvicto scheduled for 8/30.  Proceed with dose as planned.  MR DELORIS femur, ortho referral, PSMA PET ordered for prior to follow-up.    10/2/23 Palliative RT to L femur metastasis complete.  PSMA PET with mixed response.  PSA 2.21.  RT to L femur pending.  Continue with Dose #4 of Pluvicto despite mixed response to therapy.  Dex course for possible pain flare with RT.    11/8/23  Follow-up prior to Dose #5 Pluvicto.  More pain in chest/ribs/back. PSA 4.38.  Testosterone=3.  Rad onc referral for ribs.  Biopsy progressive lesion for progression on Pluvicto.  Cabazitaxel scheduled for follow-up appointment.  Tempus blood testing unrevealing for targetable mutation.    11/27/23 Bx completed from R iliac crest but unrevealing for either PCa or RCC.  Start cabazitaxel C1D1.  PSA 2.81.  " "  12/19/23 PSA = 3.66  1/8/24 PSA = 3.55  1/29/24 PSA = 3.07  2/19/24 PSA= 2.21-Start carboplatin/cabazitaxel.    3/11/24 PSA= C2 Carbo/cabazi.  PSA 1.99.    4/01/24 PSA= 1.74. C3 Carbo/cabazi.   4/22/24 PSA= 1.85. C4 Carbo/cabazi.   5/13/24 PSA = 2.06. C5 Carbo/cabazi.   6/3/24 PSA = 2.68, cycle 6 Carboplatin/cabazitaxel  6/24/24 PSA 3.51, C7 carboplatin/cabazitaxel.  Bony lesions stable on NM bone scan.  Reticulonodular densities noted in lungs as possible infection vs drug reaction.  Add chromogranin A to next labs.   7/15/24 PSA= 5.07, chromogranin A pending, cycle 8  8/5/24 PSA= 7.12, Cycle 9 carboplatin/cabazitaxel    Radiation history:   -C7         3,000 cGy       06 X      8/05/2021        8/18/2021        13       10  -2 Sacrum          2,500 cGy       18 X      4/12/2022        4/18/2022         6           -Sacrum retreat               700 cGy        18 X      2/28/2023        2/28/2023  -Left femur to 2000 cGy in 5 fractions, completed 10/10/2023   -bilateral posterior chest wall at an area of osseous involvement of the ribs and adjacent T4 and 5 spine, to be delivered to 2000 cGy in 5 fractions.  completed 12/13-12/19/23.    2. Stage III ([id number]), grade 3 of 4, clear-cell RCC of right kidney:  - Incidentally diagnosed as above.   - Underwent curative-intent robotic right radical nephrectomy with Dr. Wesley Cleveland on 3/19/19. No tumor spillage per op report.   - Path showed: \"Histologic Type: Clear cell renal cell carcinoma; Sarcomatoid Features: Not identified; Histologic Grade: Nucleolar grade 3 (WHO/ISUP). Extent Tumor Size: 4.3 cm. Microscopic Tumor Extension: Tumor extension into renal sinus (in vascular structures). Margins: Negative. Tumor Necrosis: Present; focal. Lymph-Vascular Invasion: Not identified.  Pathologic Staging (pTNM) Primary Tumor (pT): pT3a: Tumor extends into renal vein branches/renal sinus. Regional Lymph Nodes (pN): pNX. Number of Lymph Nodes Examined: 0 Distant Metastasis " "(pM): pM N/A.\"  - Restaging scans as above.  No current concerns for recurrence.      PAST MEDICAL HISTORY:  Past Medical History:   Diagnosis Date    Cancer of kidney, right (H)     Complication of anesthesia     slow wakeup     Malignant neoplasm of prostate metastatic to bone (H) 2/14/2019    Thyroid disease      CURRENT MEDICATIONS:   Current Outpatient Medications:     atorvastatin (LIPITOR) 80 MG tablet, Take 80 mg by mouth daily, Disp: , Rfl:     BERBERINE CHLORIDE PO, , Disp: , Rfl:     calcium citrate-vitamin D (CITRACAL) 315-250 MG-UNIT TABS per tablet, Take 500 mg by mouth 2 times daily With 800 Vd per pt, Disp: , Rfl:     cycloSPORINE (RESTASIS) 0.05 % ophthalmic emulsion, Place 1 drop into both eyes 2 times daily , Disp: , Rfl:     dexAMETHasone (DECADRON) 4 MG tablet, Take 2 tablets (8 mg) by mouth daily Take for 3 days, starting the day after chemo. Take with food., Disp: 6 tablet, Rfl: 2    dexAMETHasone (DECADRON) 4 MG tablet, Take 1 tablet (4 mg) by mouth daily, Disp: 7 tablet, Rfl: 2    DULoxetine (CYMBALTA) 30 MG capsule, Take 2 capsules (60 mg) by mouth daily, Disp: 60 capsule, Rfl: 0    fentaNYL (DURAGESIC) 25 mcg/hr 72 hr patch, Place 1 patch onto the skin every 72 hours remove old patch., Disp: 10 patch, Rfl: 0    furosemide (LASIX) 20 MG tablet, Take 1 tablet (20 mg) by mouth daily, Disp: 15 tablet, Rfl: 1    gabapentin (NEURONTIN) 300 MG capsule, Take 1 capsule (300 mg) by mouth every morning AND 2 capsules (600 mg) daily at 2 pm AND 2 capsules (600 mg) at bedtime., Disp: 150 capsule, Rfl: 3    levothyroxine (SYNTHROID/LEVOTHROID) 112 MCG tablet, Take 112 mcg by mouth daily, Disp: , Rfl:     lidocaine, viscous, (XYLOCAINE) 2 % solution, Swish and swallow 15 mLs in mouth every 3 hours as needed for pain (before meals and at bedtime) ; Max 8 doses/24 hour period., Disp: 100 mL, Rfl: 1    loratadine (CLARITIN) 10 MG tablet, Take 10 mg by mouth daily, Disp: , Rfl:     Multiple Vitamins-Minerals " (MULTIVITAMIN ADULT EXTRA C PO), Take 1 tablet by mouth every 24 hours, Disp: , Rfl:     naloxone (NARCAN) 4 MG/0.1ML nasal spray, Spray 1 spray (4 mg) into one nostril alternating nostrils as needed for opioid reversal every 2-3 minutes until assistance arrives, Disp: 0.2 mL, Rfl: 1    Nutritional Supplements (SALMON OIL) CAPS, Take 2 capsules by mouth daily , Disp: , Rfl:     omeprazole (PRILOSEC) 20 MG DR capsule, Take 20 mg by mouth daily, Disp: , Rfl:     ondansetron (ZOFRAN) 8 MG tablet, Take 1 tablet (8 mg) by mouth every 8 hours as needed for nausea (vomiting), Disp: 30 tablet, Rfl: 2    ondansetron (ZOFRAN) 8 MG tablet, Take 1 tablet (8 mg) by mouth every 8 hours as needed for nausea, Disp: 30 tablet, Rfl: 4    oxyCODONE (ROXICODONE) 5 MG tablet, Take 1-2 tablets (5-10 mg) by mouth every 3 hours as needed for severe pain, Disp: 90 tablet, Rfl: 0    predniSONE (DELTASONE) 5 MG tablet, Take 1 tablet (5 mg) by mouth 2 times daily (with meals) for 21 days, Disp: 42 tablet, Rfl: 0    predniSONE (DELTASONE) 5 MG tablet, Take 1 tablet (5 mg) by mouth 2 times daily (with meals) for 21 days, Disp: 42 tablet, Rfl: 0    prochlorperazine (COMPAZINE) 10 MG tablet, Take 1 tablet (10 mg) by mouth every 6 hours as needed for nausea or vomiting, Disp: 30 tablet, Rfl: 5    tamsulosin (FLOMAX) 0.4 MG capsule, Take 1 capsule (0.4 mg) by mouth daily, Disp: 30 capsule, Rfl: 3    triamterene-HCTZ (MAXZIDE-25) 37.5-25 MG tablet, Take 1 tablet by mouth daily, Disp: , Rfl:     Current Facility-Administered Medications:     heparin lock flush 100 unit/mL injection 5 mL, 5 mL, Intracatheter, Q8H, Sherry Lee, LATESHA CNP, 5 mL at 08/05/24 0825    Facility-Administered Medications Ordered in Other Visits:     cabazitaxel (JEVTANA) 38 mg in sodium chloride 0.9% in non-PVC container 278.8 mL infusion, 16 mg/m2 (Treatment Plan Recorded), Intravenous, Once, Sherry Lee APRN CNP, Last Rate: 278.8 mL/hr at 08/05/24 1037, 38 mg at  08/05/24 1037    CARBOplatin 500 mg in sodium chloride 0.9 % 325 mL infusion, 500 mg, Intravenous, Once, Sherry Lee APRN CNP    heparin lock flush 10 unit/mL injection 5-20 mL, 5-20 mL, Intracatheter, Daily PRN, Sherry Lee APRN CNP    heparin lock flush 100 unit/mL injection 5 mL, 5 mL, Intracatheter, Once PRNJesus Katie, APRN CNP    pegfilgrastim (NEULASTA Onpro Kit) On-body injector 6 mg, 6 mg, Subcutaneous, Once, Sherry Lee APRN CNP    sodium chloride (PF) 0.9% PF flush 3-20 mL, 3-20 mL, Intracatheter, q1 min prn, Sherry Lee APRN CNP      Physical Exam:   /75   Pulse 101   Temp 98.3  F (36.8  C)   Resp 16   Wt 112 kg (247 lb)   SpO2 95%   BMI 33.50 kg/m    Wt Readings from Last 10 Encounters:   08/05/24 112 kg (247 lb)   07/24/24 113.4 kg (250 lb)   07/15/24 113.9 kg (251 lb)   07/03/24 114.8 kg (253 lb)   06/24/24 114.7 kg (252 lb 14.4 oz)   06/03/24 116 kg (255 lb 12.8 oz)   05/13/24 117.9 kg (259 lb 14.4 oz)   04/22/24 118.3 kg (260 lb 14.4 oz)   04/01/24 119.4 kg (263 lb 3.2 oz)   03/28/24 116.1 kg (256 lb)   General: The patient is a pleasant male in no acute distress.  HEENT: EOMI. Sclerae are anicteric. Mucous membranes moist without lesions or thrush.   Lymph: Neck is supple with no lymphadenopathy in the cervical or supraclavicular areas.   Heart: Regular rate and rhythm.   Lungs: normal work of breathing on RA, clear to auscultation throughout   Abdomen: Bowel sounds present, soft, non-distended.    Extremities:trace bilateral lower extremity edema   Neuro: Cranial nerves II through XII are grossly intact.  Skin: No rashes, petechiae, or bruising noted on exposed skin.  Psych: affect bright, alert and oriented    LABORATORY DATA:  Most Recent 3 CBC's:  Recent Labs   Lab Test 08/05/24  0825 07/15/24  0628 06/24/24  0754   WBC 4.6 3.7* 3.9*   HGB 10.4* 9.3* 10.0*   * 107* 106*   * 147* 157   ANEUTAUTO 3.3 2.6 2.9     Most Recent 3 BMP's:  Recent Labs  "  Lab Test 08/05/24  0825 07/15/24  0628 06/24/24  0754    141 141   POTASSIUM 3.6 3.9 4.5   CHLORIDE 107 107 108*   CO2 23 25 23   BUN 14.0 11.1 16.0   CR 0.92 0.95 1.01   ANIONGAP 12 9 10   BETHANY 8.9 8.9 8.7*   * 105* 108*   PROTTOTAL 6.5 6.2* 6.3*   ALBUMIN 4.1 4.0 4.0    Most Recent 3 LFT's:  Recent Labs   Lab Test 08/05/24  0825 07/15/24  0628 06/24/24  0754   AST 20 23 21   ALT 19 18 16   ALKPHOS 94 73 80   BILITOTAL 0.4 0.3 0.3       PSA Tumor Marker   Date Value Ref Range Status   08/05/2024 7.12 (H) 0.00 - 4.50 ng/mL Final   07/15/2024 5.07 (H) 0.00 - 4.50 ng/mL Final   06/24/2024 3.51 0.00 - 4.50 ng/mL Final   06/03/2024 2.68 0.00 - 4.50 ng/mL Final   05/13/2024 2.06 0.00 - 4.50 ng/mL Final   08/22/2022 1.36 0.00 - 4.00 ug/L Final   08/01/2022 1.78 0.00 - 4.00 ug/L Final   07/11/2022 2.16 0.00 - 4.00 ug/L Final   05/05/2022 1.79 0.00 - 4.00 ug/L Final   03/22/2022 0.72 0.00 - 4.00 ug/L Final         ASSESSMENT & PLAN: Mr. Reyes is a 64 year old gentleman with metastatic castration sensitive prostate adenocarcinoma as well as an incidentally diagnosed stage III clear-cell renal cell carcinoma of the right kidney (s/p radical R nephrectomy 3/19/19).     1. Metastatic castration-resistant prostate cancer, with involvement of the bones and RPLN:   - Patient met the criteria for CHAARTED \"high-volume\" metastatic hormone-sensitive prostate cancer. He opted for docetaxel in combination with ADT (per CHACHRIS, [id number], STAMPEDE). He was subsequently treated with docetaxel x6 doses in the [facility] setting and enzalutamide.  He initiated Pluvicto in May 2023 with an initial slight decline in PSA, but this began to rise just prior to Cycle 3.  Given his XR evidence of progression in L femur and PSA rise, we re-evaluated his progression with PSMA PET scan in September 2023, with note of mixed response.  Canceled Pluvicto Dose for November due to symptomatic progression with PSA rise. PSA is labile. " Biopsy results from 11/21 was unrevealing for evaluation of NEPC vs small cell given progression without a significant rise in PSA. Tempus peripheral blood mutation analysis is unrevealing for targetable mutations. Cabazitaxel started 11/27/23 given progression on Pluvicto. Carboplatin added 2/2024 and cabazitaxel dose reduced by 20% in April 2024 due to neuropathy.  Restaging with pulmonary consolidative findings without mass-like features.  Chromogranin A 271 on 7/15/24.   -Feeling well, no new concerns today. Of note his PSA with slight increase again today, up to 7.12 ng/mL. Discussed with Dr. Mcnair, patient is clinically stable, will continue current regimen for now.   -Lupron every 3 months, next due 10/7/2024  -Cabazitaxel to be continued at 20% dose reduction.     2. Pressure in chest, resolved:  Has an ongoing sensation of pressure/need to cough in his chest, primarily against his sternum. Vital signs stable, negative pulmonary physical exam. No chest pain.     3. Bone metastases:    - Noted to have osseous metastatic disease at the time of diagnosis. S/p radiation to C spine and sacrum (Re-irradiation to sacrum).   - pain mgmt per palliative care, pain is now well controlled with fentanyl patch. Dexamethasone burst is helpful for pain flares and he has these available as needed.    - RT to L femur 10/2023.  RT for bilateral posterior ribs 12/2023 with Dr. Albert.  - Encouraged to continue calcium and vitamin D supplementation; continue Zometa 4mg IV every ~3 months.   - Last Zometa given 5/20/24.  Next due August 2024.       4. Stage 3, UISS-high risk ccRCC: s/p R nephrectomy   - Nephrectomy 3/19/19 and noted incidentally.  He is now 4.5 years post-op without evidence for recurrence.   - At this point, there is a minimal risk of recurrence of his RCC given the time since surgery without the use of adjuvant immunotherapy. There is no suspicious adenopathy or other features on his most recent imaging  studies.    - Will continue to monitor with imaging planned for prostate cancer.      5. Sensory neuropathy, Toes/feet Grade 2  - continue monitor for worsening with cabazitaxel.   - Continue acupuncture once weekly. Patient working with insurance to try to increase to twice a week.   - He is on gabapentin and duloxetine managed by palliative care   -duloxetine dose recently increased with further improvement in neuropathy. Tried weaning off gabapentin and pain in back and ribs was uncontrolled again. Continue duloxetine and gabapentin per palliative.      6. Anemia  - likely secondary to chemotherapy. Iron levels adequate on 4/22/24. MCV elevated.  Folate and B12 adequate.  Retic appropriately elevated.      7. Left lower extremity swelling   - US 4/22/24 negative for DVT. CT abdomen pelvis 4/29 without significant or new lymphadenopathy. Improved lately with new compression garments. Continue compression stocking. Following with lymphedema therapy.    PLAN:   1. Proceed with C9 of carboplatin/cabazitaxel.  20% dose reduction of cabazi.  Consider dose reduction of carbo next if neuropathy is worse.    2. Continue acupuncture, gabapentin and duloxetine for neuropathy.     3. Return in 3 weeks for C10.    4. Restaging in 3 months with CT CAP and NM bone scan.  Sooner if new pain or symptoms.          The longitudinal plan of care for the diagnosis(es)/condition(s) as documented were addressed during this visit. Due to the added complexity in care, I will continue to support Walter in the subsequent management and with ongoing continuity of care.      28 minutes spent on the date of the encounter doing chart review, review of test results, interpretation of tests, patient visit, and documentation       LATESHA Humphreys CNP

## 2024-08-05 ENCOUNTER — APPOINTMENT (OUTPATIENT)
Dept: LAB | Facility: CLINIC | Age: 64
End: 2024-08-05
Attending: STUDENT IN AN ORGANIZED HEALTH CARE EDUCATION/TRAINING PROGRAM
Payer: COMMERCIAL

## 2024-08-05 ENCOUNTER — INFUSION THERAPY VISIT (OUTPATIENT)
Dept: ONCOLOGY | Facility: CLINIC | Age: 64
End: 2024-08-05
Attending: STUDENT IN AN ORGANIZED HEALTH CARE EDUCATION/TRAINING PROGRAM
Payer: COMMERCIAL

## 2024-08-05 VITALS
BODY MASS INDEX: 33.5 KG/M2 | DIASTOLIC BLOOD PRESSURE: 75 MMHG | WEIGHT: 247 LBS | TEMPERATURE: 98.3 F | HEART RATE: 101 BPM | SYSTOLIC BLOOD PRESSURE: 114 MMHG | RESPIRATION RATE: 16 BRPM | OXYGEN SATURATION: 95 %

## 2024-08-05 DIAGNOSIS — Z76.89 PREVENTION OF CHEMOTHERAPY-INDUCED NEUTROPENIA: ICD-10-CM

## 2024-08-05 DIAGNOSIS — R60.0 PERIPHERAL EDEMA: ICD-10-CM

## 2024-08-05 DIAGNOSIS — C61 MALIGNANT NEOPLASM OF PROSTATE METASTATIC TO BONE (H): Primary | ICD-10-CM

## 2024-08-05 DIAGNOSIS — C79.51 MALIGNANT NEOPLASM OF PROSTATE METASTATIC TO BONE (H): Primary | ICD-10-CM

## 2024-08-05 DIAGNOSIS — G62.0 CHEMOTHERAPY-INDUCED PERIPHERAL NEUROPATHY (H): ICD-10-CM

## 2024-08-05 DIAGNOSIS — T45.1X5A CHEMOTHERAPY-INDUCED PERIPHERAL NEUROPATHY (H): ICD-10-CM

## 2024-08-05 LAB
ALBUMIN SERPL BCG-MCNC: 4.1 G/DL (ref 3.5–5.2)
ALP SERPL-CCNC: 94 U/L (ref 40–150)
ALT SERPL W P-5'-P-CCNC: 19 U/L (ref 0–70)
ANION GAP SERPL CALCULATED.3IONS-SCNC: 12 MMOL/L (ref 7–15)
AST SERPL W P-5'-P-CCNC: 20 U/L (ref 0–45)
BASOPHILS # BLD AUTO: 0 10E3/UL (ref 0–0.2)
BASOPHILS NFR BLD AUTO: 0 %
BILIRUB SERPL-MCNC: 0.4 MG/DL
BUN SERPL-MCNC: 14 MG/DL (ref 8–23)
CALCIUM SERPL-MCNC: 8.9 MG/DL (ref 8.8–10.4)
CHLORIDE SERPL-SCNC: 107 MMOL/L (ref 98–107)
CREAT SERPL-MCNC: 0.92 MG/DL (ref 0.67–1.17)
EGFRCR SERPLBLD CKD-EPI 2021: >90 ML/MIN/1.73M2
EOSINOPHIL # BLD AUTO: 0 10E3/UL (ref 0–0.7)
EOSINOPHIL NFR BLD AUTO: 0 %
ERYTHROCYTE [DISTWIDTH] IN BLOOD BY AUTOMATED COUNT: 16.3 % (ref 10–15)
GLUCOSE SERPL-MCNC: 158 MG/DL (ref 70–99)
HCO3 SERPL-SCNC: 23 MMOL/L (ref 22–29)
HCT VFR BLD AUTO: 32.5 % (ref 40–53)
HGB BLD-MCNC: 10.4 G/DL (ref 13.3–17.7)
IMM GRANULOCYTES # BLD: 0 10E3/UL
IMM GRANULOCYTES NFR BLD: 0 %
LYMPHOCYTES # BLD AUTO: 0.8 10E3/UL (ref 0.8–5.3)
LYMPHOCYTES NFR BLD AUTO: 18 %
MCH RBC QN AUTO: 34.3 PG (ref 26.5–33)
MCHC RBC AUTO-ENTMCNC: 32 G/DL (ref 31.5–36.5)
MCV RBC AUTO: 107 FL (ref 78–100)
MONOCYTES # BLD AUTO: 0.4 10E3/UL (ref 0–1.3)
MONOCYTES NFR BLD AUTO: 8 %
NEUTROPHILS # BLD AUTO: 3.3 10E3/UL (ref 1.6–8.3)
NEUTROPHILS NFR BLD AUTO: 74 %
NRBC # BLD AUTO: 0 10E3/UL
NRBC BLD AUTO-RTO: 0 /100
PLATELET # BLD AUTO: 134 10E3/UL (ref 150–450)
POTASSIUM SERPL-SCNC: 3.6 MMOL/L (ref 3.4–5.3)
PROT SERPL-MCNC: 6.5 G/DL (ref 6.4–8.3)
PSA SERPL DL<=0.01 NG/ML-MCNC: 7.12 NG/ML (ref 0–4.5)
RBC # BLD AUTO: 3.03 10E6/UL (ref 4.4–5.9)
SODIUM SERPL-SCNC: 142 MMOL/L (ref 135–145)
WBC # BLD AUTO: 4.6 10E3/UL (ref 4–11)

## 2024-08-05 PROCEDURE — 36591 DRAW BLOOD OFF VENOUS DEVICE: CPT | Performed by: STUDENT IN AN ORGANIZED HEALTH CARE EDUCATION/TRAINING PROGRAM

## 2024-08-05 PROCEDURE — 96375 TX/PRO/DX INJ NEW DRUG ADDON: CPT

## 2024-08-05 PROCEDURE — 96377 APPLICATON ON-BODY INJECTOR: CPT | Mod: 59

## 2024-08-05 PROCEDURE — 84403 ASSAY OF TOTAL TESTOSTERONE: CPT | Performed by: STUDENT IN AN ORGANIZED HEALTH CARE EDUCATION/TRAINING PROGRAM

## 2024-08-05 PROCEDURE — 99214 OFFICE O/P EST MOD 30 MIN: CPT

## 2024-08-05 PROCEDURE — 96372 THER/PROPH/DIAG INJ SC/IM: CPT

## 2024-08-05 PROCEDURE — 258N000003 HC RX IP 258 OP 636

## 2024-08-05 PROCEDURE — 85025 COMPLETE CBC W/AUTO DIFF WBC: CPT | Performed by: STUDENT IN AN ORGANIZED HEALTH CARE EDUCATION/TRAINING PROGRAM

## 2024-08-05 PROCEDURE — 96413 CHEMO IV INFUSION 1 HR: CPT

## 2024-08-05 PROCEDURE — 250N000011 HC RX IP 250 OP 636

## 2024-08-05 PROCEDURE — 84153 ASSAY OF PSA TOTAL: CPT | Performed by: STUDENT IN AN ORGANIZED HEALTH CARE EDUCATION/TRAINING PROGRAM

## 2024-08-05 PROCEDURE — 96367 TX/PROPH/DG ADDL SEQ IV INF: CPT

## 2024-08-05 PROCEDURE — 99213 OFFICE O/P EST LOW 20 MIN: CPT

## 2024-08-05 PROCEDURE — G2211 COMPLEX E/M VISIT ADD ON: HCPCS

## 2024-08-05 PROCEDURE — 96417 CHEMO IV INFUS EACH ADDL SEQ: CPT

## 2024-08-05 PROCEDURE — 82374 ASSAY BLOOD CARBON DIOXIDE: CPT | Performed by: STUDENT IN AN ORGANIZED HEALTH CARE EDUCATION/TRAINING PROGRAM

## 2024-08-05 RX ORDER — PALONOSETRON 0.05 MG/ML
0.25 INJECTION, SOLUTION INTRAVENOUS ONCE
Status: COMPLETED | OUTPATIENT
Start: 2024-08-05 | End: 2024-08-05

## 2024-08-05 RX ORDER — DIPHENHYDRAMINE HYDROCHLORIDE 50 MG/ML
50 INJECTION INTRAMUSCULAR; INTRAVENOUS
Status: CANCELLED
Start: 2024-08-05

## 2024-08-05 RX ORDER — LORAZEPAM 2 MG/ML
0.5 INJECTION INTRAMUSCULAR EVERY 4 HOURS PRN
Status: CANCELLED | OUTPATIENT
Start: 2024-08-05

## 2024-08-05 RX ORDER — HEPARIN SODIUM (PORCINE) LOCK FLUSH IV SOLN 100 UNIT/ML 100 UNIT/ML
5 SOLUTION INTRAVENOUS EVERY 8 HOURS
Status: DISCONTINUED | OUTPATIENT
Start: 2024-08-05 | End: 2024-08-05 | Stop reason: HOSPADM

## 2024-08-05 RX ORDER — HEPARIN SODIUM,PORCINE 10 UNIT/ML
5-20 VIAL (ML) INTRAVENOUS DAILY PRN
Status: CANCELLED | OUTPATIENT
Start: 2024-08-05

## 2024-08-05 RX ORDER — HEPARIN SODIUM (PORCINE) LOCK FLUSH IV SOLN 100 UNIT/ML 100 UNIT/ML
5 SOLUTION INTRAVENOUS
Status: CANCELLED | OUTPATIENT
Start: 2024-08-05

## 2024-08-05 RX ORDER — METHYLPREDNISOLONE SODIUM SUCCINATE 125 MG/2ML
125 INJECTION, POWDER, LYOPHILIZED, FOR SOLUTION INTRAMUSCULAR; INTRAVENOUS
Status: CANCELLED
Start: 2024-08-05

## 2024-08-05 RX ORDER — ALBUTEROL SULFATE 0.83 MG/ML
2.5 SOLUTION RESPIRATORY (INHALATION)
Status: CANCELLED | OUTPATIENT
Start: 2024-08-05

## 2024-08-05 RX ORDER — HEPARIN SODIUM (PORCINE) LOCK FLUSH IV SOLN 100 UNIT/ML 100 UNIT/ML
5 SOLUTION INTRAVENOUS
Status: DISCONTINUED | OUTPATIENT
Start: 2024-08-05 | End: 2024-08-05 | Stop reason: HOSPADM

## 2024-08-05 RX ORDER — ALBUTEROL SULFATE 90 UG/1
1-2 AEROSOL, METERED RESPIRATORY (INHALATION)
Status: CANCELLED
Start: 2024-08-05

## 2024-08-05 RX ORDER — EPINEPHRINE 1 MG/ML
0.3 INJECTION, SOLUTION INTRAMUSCULAR; SUBCUTANEOUS EVERY 5 MIN PRN
Status: CANCELLED | OUTPATIENT
Start: 2024-08-05

## 2024-08-05 RX ORDER — MEPERIDINE HYDROCHLORIDE 25 MG/ML
25 INJECTION INTRAMUSCULAR; INTRAVENOUS; SUBCUTANEOUS EVERY 30 MIN PRN
Status: CANCELLED | OUTPATIENT
Start: 2024-08-05

## 2024-08-05 RX ORDER — PALONOSETRON 0.05 MG/ML
0.25 INJECTION, SOLUTION INTRAVENOUS ONCE
Status: CANCELLED | OUTPATIENT
Start: 2024-08-05

## 2024-08-05 RX ORDER — HEPARIN SODIUM,PORCINE 10 UNIT/ML
5-20 VIAL (ML) INTRAVENOUS DAILY PRN
Status: DISCONTINUED | OUTPATIENT
Start: 2024-08-05 | End: 2024-08-05 | Stop reason: HOSPADM

## 2024-08-05 RX ORDER — PREDNISONE 5 MG/1
5 TABLET ORAL 2 TIMES DAILY WITH MEALS
Qty: 42 TABLET | Refills: 0 | Status: SHIPPED | OUTPATIENT
Start: 2024-08-05 | End: 2024-08-26

## 2024-08-05 RX ADMIN — PEGFILGRASTIM 6 MG: KIT SUBCUTANEOUS at 11:47

## 2024-08-05 RX ADMIN — PALONOSETRON HYDROCHLORIDE 0.25 MG: 0.25 INJECTION INTRAVENOUS at 09:54

## 2024-08-05 RX ADMIN — SODIUM CHLORIDE 250 ML: 9 INJECTION, SOLUTION INTRAVENOUS at 09:52

## 2024-08-05 RX ADMIN — SODIUM CHLORIDE 38 MG: 9 INJECTION, SOLUTION INTRAVENOUS at 10:37

## 2024-08-05 RX ADMIN — Medication 5 ML: at 12:15

## 2024-08-05 RX ADMIN — CARBOPLATIN 500 MG: 10 INJECTION, SOLUTION INTRAVENOUS at 11:45

## 2024-08-05 RX ADMIN — Medication 5 ML: at 08:25

## 2024-08-05 RX ADMIN — DIPHENHYDRAMINE HYDROCHLORIDE 25 MG: 50 INJECTION, SOLUTION INTRAMUSCULAR; INTRAVENOUS at 09:59

## 2024-08-05 RX ADMIN — FOSAPREPITANT: 150 INJECTION, POWDER, LYOPHILIZED, FOR SOLUTION INTRAVENOUS at 10:13

## 2024-08-05 RX ADMIN — FAMOTIDINE 20 MG: 10 INJECTION INTRAVENOUS at 09:56

## 2024-08-05 ASSESSMENT — PAIN SCALES - GENERAL: PAINLEVEL: NO PAIN (1)

## 2024-08-05 NOTE — NURSING NOTE
Chief Complaint   Patient presents with    Port Draw     Power needle, heparin locked, vitals checked     Karin Burr RN on 8/5/2024 at 8:28 AM

## 2024-08-05 NOTE — NURSING NOTE
Oncology Rooming Note    August 5, 2024 8:47 AM   Walter Reyes is a 64 year old male who presents for:    Chief Complaint   Patient presents with    Port Draw     Power needle, heparin locked, vitals checked    Oncology Clinic Visit     Malignant neoplasm of prostate metastatic to bone      Initial Vitals: /75   Pulse 101   Temp 98.3  F (36.8  C)   Resp 16   Wt 112 kg (247 lb)   SpO2 95%   BMI 33.50 kg/m   Estimated body mass index is 33.5 kg/m  as calculated from the following:    Height as of 7/24/24: 1.829 m (6').    Weight as of this encounter: 112 kg (247 lb). Body surface area is 2.39 meters squared.  No Pain (1) Comment: Data Unavailable   No LMP for male patient.  Allergies reviewed: Yes  Medications reviewed: Yes    Medications: Medication refills not needed today.  Pharmacy name entered into EPIC:    [facility] [place], [place] - [address]  [facility] - [place], [place] - [address] [place] [address] PHARMACY [place], [place] - [address] [place] [address] [phone]  [facility] [place], [place] - [address] [facility]    Frailty Screening:   Is the patient here for a new oncology consult visit in cancer care? 2. No      Clinical concerns: no other complaints      Arjun Catherine

## 2024-08-05 NOTE — PATIENT INSTRUCTIONS
You received an anti-nausea medication called Aloxi today. Aloxi is in the same drug class as one of your take home as needed anti-nausea medications called Ondanestron or Zofran. Do not take your Zofran prescription for the next three days because you are covered by the Aloxi you received today. You can begin to take your Zofran prescription as needed starting on Thursday, August 8th. If you need coverage for your nausea before then, feel free to use your compazine prescription.     [facility] Triage and after hours / weekends / holidays:  [phone]    Please call the triage or after hours line if you experience a temperature greater than or equal to 100.4, shaking chills, have uncontrolled nausea, vomiting and/or diarrhea, dizziness, shortness of breath, chest pain, bleeding, unexplained bruising, or if you have any other new/concerning symptoms, questions or concerns.      If you are having any concerning symptoms or wish to speak to a provider before your next infusion visit, please call triage to notify them so we can adequately serve you.     If you need a refill on a narcotic prescription or other medication, please call before your infusion appointment.

## 2024-08-05 NOTE — Clinical Note
8/5/2024      Walter Reyes  [id number]  [facility] [address]      Dear Colleague,    Thank you for referring your patient, Walter Reyes, to the [facility]. Please see a copy of my visit note below.        [facility] On[facility] Followup  Aug 5, 2024    REASON FOR VISIT: Follow-up for metastatic castration-resistant prostate cancer.      INTERVAL HISTORY:   Started cymbalta which is providing relief with neuropathy. The sharp pains and burning sensation are gone. He can also now feel his right shin again.     The first week of chemo cycle he is nauseated with a reduced appetite and more fatigued. Taking compazine scheduled keeps nausea under control, also has Zofran if needed. Appetite and oral intake improve after the first week. Denies any bowel concerns.     Having mild low back pain today, attributes this to riding his e-bike a lot lately which has kept him active.     Left leg swelling is stable, occasionally hurts like his skin is stretching. Scheduled for a fitting for compression later this week. No redness or pain with ambulation.       Review of Systems:  Remainder of 12 point ROS reviewed and otherwise negative except as in interval history.       ONCOLOGY HISTORY: Mr. 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 His oncologic history is detailed below.     1. 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 Naylor at [facility]. Prior PSA were 1.4 on 8/16/17, 2.4 on 6/28/16, 2.9 on 6/28/16, and as low as 0.4 on 3/26/2003.   - 1/08/2019: Consultation with Sarah  "GIOVANNA Wilson in Urology clinic. Repeat PSA 9.6.  - 1/16/2019: MRI prostate with contrast - \"This examination is characterized as PIRADS 5- very high probability. Clinically significant cancer is highly likely to be present. There is a large, invasive mass arising from the right peripheral zone and extending into the neurovascular bundle, seminal vesicle, and along the anterolateral right mesorectal fascia. Metastatic right external iliac lymph node. Metastatic lesion in the posterior/superior right acetabulum.\"  - 1/25/2019: CT abdomen and pelvis with IV contrast - \"1. Heterogeneous enhancing mass posteriorly in the upper pole of the right kidney measures 4.5 x 5.8 x 5.7 cm (AP by transverse by craniocaudal). It has a small nodular component extending posterior medially which abuts the right psoas muscle. This nodular extension measures 2.1 x 2.1 cm. Minimal stranding about the mass. No definite thrombus within the right renal vein. This renal mass is compatible with a renal cell carcinoma. A paraaortic lymph node situated immediately posterior to the left renal vein measures 1.1 cm in short axis, suspicious for metastasis. 2. A 2 cm right external iliac lymph node is also suspicious for metastases. 3. Multiple sclerotic osseous lesions suspicious for metastases. These include 0.8 and 0.6 cm sclerotic lesions laterally in the right iliac wing (images 53 and 62 respectively). Ill-defined groundglass density laterally in the right acetabulum measuring 1.7 x 1.2 cm corresponds with the lesion identified on MRI. A 0.4 cm groundglass density in the left acetabulum. Sclerotic lesion in the left femoral neck measures 0.9 x 1.6 cm (image 81). Sclerotic metastases would be more compatible with prostate metastases. 4. A 3 subcentimeter hepatic lesions are indeterminate. Metastases would be a consideration. These can be further characterized with liver MRI.\"  - 1/25/2019: NM bone scan - \"There is focal bony uptake in the " "left femoral neck, right acetabulum, right of midline at the S1 or L5 level of the spine, within multiple bilateral ribs, in the C7 or T1 level of the spine, and anteriorly within the skull. Findings are suspicious for metastases.\"  - 1/31/19: CT chest with contrast - \" No suspicious nodules in the chest.  Stable appearance of a 5.8 cm right renal mass concerning for renal carcinoma until proven otherwise.  Stable indeterminant subcentimeter hypodensities in the liver.  Multifocal osteoblastic metastasis including 2.5 cm lesion in the right fifth rib posteriorly and a 1.6 cm lesion in the right third rib posteriorly. No lytic lesions identified.\"  - 2/6/19: CT-guided right sclerotic fifth rib lesion biopsy - \"Metastatic carcinoma, consistent with prostate primary.  Immunohistochemical stains performed show the metastatic carcinoma stains positive for NKX3.1 (prostate marker), negative for STACIE 3 and PAX8, which supports the above diagnosis.\"  - 2/15/19: Started Casodex 50mg every day and consented for the biobanking protocol. 3/18/19 - stopped Casodex.  - 2/19/19: Case discussed in tumor board - recommendation for nephectomy for suspected malignant right renal mass.   - 2/26/19: Started Lupron 22.5mg every 3 months.   - 5/8/19: Started Docetaxel 75mg/m2 IV every 3 weeks. 06/18/19 - cycle 3, 7/10/19 - cycle 4, 07/31/19 - cycle 5, 08/21/19 - cycle 6.   - 10/2/19: Restaging CT CAP and NM Bone Scan showed improved osseous disease, no evidence of recurrent or new metastatic disease.  - 1/5/20: Restaging CT CAP and NM Bone Scan showed stable osseous disease, no evidence of recurrent or new metastatic disease.  - 4/6/20: NM bone scan with slightly improved uptake in known skeletal mets. CT C/A/P with contrast with stable osseous mets, no yusuf enlargement, no new visceral mets etc.  - 04/08/20: Zometa every 3 months.  - 10/5/20: CT C/A/P with contrast and NM bone scan - SD.   - 1/4/21: CT C/A/P with contrast and NM bone " "scan - SD but slight increase in right 5th rib tracer uptake and in posterior L5 sclerosis. 1/6/21  PSA = 0.29  - 03/29/21: CT C/A/P with contrast - single new right sacral 8mm bone lesion (suspicious), other bone mets stable. No visceral/yusuf disease. Nephrectomy site without recurrence. NM bone scan - SD but \"increased uptake associated with the prior lesions of the lower cervical spine, posterior right fourth rib and right L5 posterior arch elements. Otherwise unchanged uptake associated with the proximal left femur and left anterior fourth rib. Similar uptake of the left halux, possibly secondary to degenerative osteoarthritis or gout.\"    3/31/21 PSA = 0.44  7/7/21  PSA = 0.47  11/2021 PSA = 1.05  -- Start XTANDI  12/16/21 PSA =0.22  2/11/22 PSA= 0.50  3/22/22 PSA=0.72  5/5/2022 PSA=1.79  7/11/2022 PSA=2.16 --Start cycle 1 docetaxel   8/22/22 PSA = 1.36  9/12/22 PSA = 1.09  10/24/22 Cycle #6 of Docetaxel. End of treatment  1/9/23  PSA =0.66  3/20/23  PSA=1.54  4/21/23 PSA = 1.81  T=15  5/22/23 C1 Pluvicto   06/07/23          PSA = 1.42  7/18/23 PSA=1.75, C2 Pluvicto   8/28/23 Transfer of care initial visit for Tabby.  PSA 2.06.  C3 Pluvicto scheduled for 8/30.  Proceed with dose as planned.  MR DELORIS femur, ortho referral, PSMA PET ordered for prior to follow-up.    10/2/23 Palliative RT to L femur metastasis complete.  PSMA PET with mixed response.  PSA 2.21.  RT to L femur pending.  Continue with Dose #4 of Pluvicto despite mixed response to therapy.  Dex course for possible pain flare with RT.    11/8/23  Follow-up prior to Dose #5 Pluvicto.  More pain in chest/ribs/back. PSA 4.38.  Testosterone=3.  Rad onc referral for ribs.  Biopsy progressive lesion for progression on Pluvicto.  Cabazitaxel scheduled for follow-up appointment.  Tempus blood testing unrevealing for targetable mutation.    11/27/23 Bx completed from R iliac crest but unrevealing for either PCa or RCC.  Start cabazitaxel C1D1.  PSA 2.81.  " "  12/19/23 PSA = 3.66  1/8/24 PSA = 3.55  1/29/24 PSA = 3.07  2/19/24 PSA= 2.21-Start carboplatin/cabazitaxel.    3/11/24 PSA= C2 Carbo/cabazi.  PSA 1.99.    4/01/24 PSA= 1.74. C3 Carbo/cabazi.   4/22/24 PSA= 1.85. C4 Carbo/cabazi.   5/13/24 PSA = 2.06. C5 Carbo/cabazi.   6/3/24 PSA = 2.68, cycle 6 Carboplatin/cabazitaxel  6/24/24 PSA 3.51, C7 carboplatin/cabazitaxel.  Bony lesions stable on NM bone scan.  Reticulonodular densities noted in lungs as possible infection vs drug reaction.  Add chromogranin A to next labs.   7/15/24 PSA= 5.07, chromogranin A pending    Radiation history:   -C7         3,000 cGy       06 X      8/05/2021        8/18/2021        13       10  -2 Sacrum          2,500 cGy       18 X      4/12/2022        4/18/2022         6           -Sacrum retreat               700 cGy        18 X      2/28/2023        2/28/2023  -Left femur to 2000 cGy in 5 fractions, completed 10/10/2023   -bilateral posterior chest wall at an area of osseous involvement of the ribs and adjacent T4 and 5 spine, to be delivered to 2000 cGy in 5 fractions.  completed 12/13-12/19/23.    2. Stage III ([id number]), grade 3 of 4, clear-cell RCC of right kidney:  - Incidentally diagnosed as above.   - Underwent curative-intent robotic right radical nephrectomy with Dr. Wesley Cleveland on 3/19/19. No tumor spillage per op report.   - Path showed: \"Histologic Type: Clear cell renal cell carcinoma; Sarcomatoid Features: Not identified; Histologic Grade: Nucleolar grade 3 (WHO/ISUP). Extent Tumor Size: 4.3 cm. Microscopic Tumor Extension: Tumor extension into renal sinus (in vascular structures). Margins: Negative. Tumor Necrosis: Present; focal. Lymph-Vascular Invasion: Not identified.  Pathologic Staging (pTNM) Primary Tumor (pT): pT3a: Tumor extends into renal vein branches/renal sinus. Regional Lymph Nodes (pN): pNX. Number of Lymph Nodes Examined: 0 Distant Metastasis (pM): pM N/A.\"  - Restaging scans as above.  No current " concerns for recurrence.      PAST MEDICAL HISTORY:  Past Medical History:   Diagnosis Date    Cancer of kidney, right (H)     Complication of anesthesia     slow wakeup     Malignant neoplasm of prostate metastatic to bone (H) 2/14/2019    Thyroid disease      CURRENT MEDICATIONS:   Current Outpatient Medications:     atorvastatin (LIPITOR) 80 MG tablet, Take 80 mg by mouth daily, Disp: , Rfl:     BERBERINE CHLORIDE PO, , Disp: , Rfl:     calcium citrate-vitamin D (CITRACAL) 315-250 MG-UNIT TABS per tablet, Take 500 mg by mouth 2 times daily With 800 Vd per pt, Disp: , Rfl:     cycloSPORINE (RESTASIS) 0.05 % ophthalmic emulsion, Place 1 drop into both eyes 2 times daily , Disp: , Rfl:     dexAMETHasone (DECADRON) 4 MG tablet, Take 2 tablets (8 mg) by mouth daily Take for 3 days, starting the day after chemo. Take with food., Disp: 6 tablet, Rfl: 2    dexAMETHasone (DECADRON) 4 MG tablet, Take 1 tablet (4 mg) by mouth daily, Disp: 7 tablet, Rfl: 2    DULoxetine (CYMBALTA) 30 MG capsule, Take 2 capsules (60 mg) by mouth daily, Disp: 60 capsule, Rfl: 0    fentaNYL (DURAGESIC) 25 mcg/hr 72 hr patch, Place 1 patch onto the skin every 72 hours remove old patch., Disp: 10 patch, Rfl: 0    furosemide (LASIX) 20 MG tablet, Take 1 tablet (20 mg) by mouth daily, Disp: 15 tablet, Rfl: 1    gabapentin (NEURONTIN) 300 MG capsule, Take 1 capsule (300 mg) by mouth every morning AND 2 capsules (600 mg) daily at 2 pm AND 2 capsules (600 mg) at bedtime., Disp: 150 capsule, Rfl: 3    levothyroxine (SYNTHROID/LEVOTHROID) 112 MCG tablet, Take 112 mcg by mouth daily, Disp: , Rfl:     lidocaine, viscous, (XYLOCAINE) 2 % solution, Swish and swallow 15 mLs in mouth every 3 hours as needed for pain (before meals and at bedtime) ; Max 8 doses/24 hour period., Disp: 100 mL, Rfl: 1    loratadine (CLARITIN) 10 MG tablet, Take 10 mg by mouth daily, Disp: , Rfl:     Multiple Vitamins-Minerals (MULTIVITAMIN ADULT EXTRA C PO), Take 1 tablet by mouth  every 24 hours, Disp: , Rfl:     naloxone (NARCAN) 4 MG/0.1ML nasal spray, Spray 1 spray (4 mg) into one nostril alternating nostrils as needed for opioid reversal every 2-3 minutes until assistance arrives, Disp: 0.2 mL, Rfl: 1    Nutritional Supplements (SALMON OIL) CAPS, Take 2 capsules by mouth daily , Disp: , Rfl:     omeprazole (PRILOSEC) 20 MG DR capsule, Take 20 mg by mouth daily, Disp: , Rfl:     ondansetron (ZOFRAN) 8 MG tablet, Take 1 tablet (8 mg) by mouth every 8 hours as needed for nausea (vomiting), Disp: 30 tablet, Rfl: 2    ondansetron (ZOFRAN) 8 MG tablet, Take 1 tablet (8 mg) by mouth every 8 hours as needed for nausea, Disp: 30 tablet, Rfl: 4    oxyCODONE (ROXICODONE) 5 MG tablet, Take 1-2 tablets (5-10 mg) by mouth every 3 hours as needed for severe pain, Disp: 90 tablet, Rfl: 0    predniSONE (DELTASONE) 5 MG tablet, Take 1 tablet (5 mg) by mouth 2 times daily (with meals) for 21 days, Disp: 42 tablet, Rfl: 0    prochlorperazine (COMPAZINE) 10 MG tablet, Take 1 tablet (10 mg) by mouth every 6 hours as needed for nausea or vomiting, Disp: 30 tablet, Rfl: 5    tamsulosin (FLOMAX) 0.4 MG capsule, Take 1 capsule (0.4 mg) by mouth daily, Disp: 30 capsule, Rfl: 3    triamterene-HCTZ (MAXZIDE-25) 37.5-25 MG tablet, Take 1 tablet by mouth daily, Disp: , Rfl:       Physical Exam:   There were no vitals taken for this visit.  Wt Readings from Last 10 Encounters:   07/24/24 113.4 kg (250 lb)   07/15/24 113.9 kg (251 lb)   07/03/24 114.8 kg (253 lb)   06/24/24 114.7 kg (252 lb 14.4 oz)   06/03/24 116 kg (255 lb 12.8 oz)   05/13/24 117.9 kg (259 lb 14.4 oz)   04/22/24 118.3 kg (260 lb 14.4 oz)   04/01/24 119.4 kg (263 lb 3.2 oz)   03/28/24 116.1 kg (256 lb)   03/11/24 119.3 kg (263 lb 1.6 oz)   General: The patient is a pleasant male in no acute distress.  HEENT: EOMI. Sclerae are anicteric. Mucous membranes moist without lesions or thrush.   Lymph: Neck is supple with no lymphadenopathy in the cervical or  supraclavicular areas.   Heart: Regular rate and rhythm.   Lungs: normal work of breathing on RA, clear to auscultation throughout   Abdomen: Bowel sounds present, soft, non-distended.    Extremities:trace pitting lower extremity edema in the left leg into the thigh, trace right sided edema.   Neuro: Cranial nerves II through XII are grossly intact.  Skin: No rashes, petechiae, or bruising noted on exposed skin.  Psych: affect bright, alert and oriented    LABORATORY DATA:  Most Recent 3 CBC's:  Recent Labs   Lab Test 07/15/24  0628 06/24/24  0754 06/03/24  0752   WBC 3.7* 3.9* 4.1   HGB 9.3* 10.0* 10.0*   * 106* 105*   * 157 139*   ANEUTAUTO 2.6 2.9 2.8     Most Recent 3 BMP's:  Recent Labs   Lab Test 07/15/24  0628 06/24/24  0754 06/03/24  0752    141 140   POTASSIUM 3.9 4.5 3.9   CHLORIDE 107 108* 106   CO2 25 23 24   BUN 11.1 16.0 14.6   CR 0.95 1.01 1.00   ANIONGAP 9 10 10   BETHANY 8.9 8.7* 8.5*   * 108* 130*   PROTTOTAL 6.2* 6.3* 6.2*   ALBUMIN 4.0 4.0 3.9    Most Recent 3 LFT's:  Recent Labs   Lab Test 07/15/24  0628 06/24/24  0754 06/03/24  0752   AST 23 21 22   ALT 18 16 18   ALKPHOS 73 80 73   BILITOTAL 0.3 0.3 0.3     PSA Tumor Marker   Date Value Ref Range Status   07/15/2024 5.07 (H) 0.00 - 4.50 ng/mL Final   06/24/2024 3.51 0.00 - 4.50 ng/mL Final   06/03/2024 2.68 0.00 - 4.50 ng/mL Final   05/13/2024 2.06 0.00 - 4.50 ng/mL Final   04/22/2024 1.85 0.00 - 4.50 ng/mL Final   08/22/2022 1.36 0.00 - 4.00 ug/L Final   08/01/2022 1.78 0.00 - 4.00 ug/L Final   07/11/2022 2.16 0.00 - 4.00 ug/L Final   05/05/2022 1.79 0.00 - 4.00 ug/L Final   03/22/2022 0.72 0.00 - 4.00 ug/L Final         ASSESSMENT & PLAN: Mr. Reyes is a 64 year old gentleman with metastatic castration sensitive prostate adenocarcinoma as well as an incidentally diagnosed stage III clear-cell renal cell carcinoma of the right kidney (s/p radical R nephrectomy 3/19/19), who is here prior to cycle 3 cabazitaxel.  "     1. Metastatic castration-resistant prostate cancer, with involvement of the bones and RPLN:   - Patient met the criteria for CHAARTED \"high-volume\" metastatic hormone-sensitive prostate cancer. He opted for docetaxel in combination with ADT (per JOSEFA, [id number], KARLA). He was subsequently treated with docetaxel x6 doses in the [facility] setting and enzalutamide.  He initiated Pluvicto in May 2023 with an initial slight decline in PSA, but this began to rise just prior to Cycle 3.  Given his XR evidence of progression in L femur and PSA rise, we re-evaluated his progression with PSMA PET scan in September 2023, with note of mixed response.  Canceled Pluvicto Dose for November due to symptomatic progression with PSA rise. PSA is labile. Biopsy results from 11/21 was unrevealing for evaluation of NEPC vs small cell given progression without a significant rise in PSA. Tempus peripheral blood mutation analysis is unrevealing for targetable mutations. Cabazitaxel started 11/27/23 given progression on Pluvicto. Carboplatin added 2/2024 and cabazitaxel dose reduced by 20% in April 2024 due to neuropathy.  Restaging with pulmonary consolidative findings without mass-like features.    -Chromogranin A drawn today, pending.   -Feeling well, no new concerns today.   -Lupron due today, next will be due 10/7/2024  -Cabazitaxel to be continued at 20% dose reduction.     2. Pressure in chest, resolved:  Has an ongoing sensation of pressure/need to cough in his chest, primarily against his sternum. Vital signs stable, negative pulmonary physical exam. No chest pain.     3. Bone metastases:    - Noted to have osseous metastatic disease at the time of diagnosis. S/p radiation to C spine and sacrum (Re-irradiation to sacrum).   - pain mgmt per palliative care, pain is now well controlled with fentanyl patch. Dexamethasone burst is helpful for pain flares and he has these available as needed.    - RT to L femur 10/2023.  RT for " bilateral posterior ribs 12/2023 with Dr. Albert.  - Encouraged to continue calcium and vitamin D supplementation; continue Zometa 4mg IV every ~3 months.   - Last Zometa given 5/20/24.  Next due August 2024.       4. Stage 3, UISS-high risk ccRCC: s/p R nephrectomy   - Nephrectomy 3/19/19 and noted incidentally.  He is now 4.5 years post-op without evidence for recurrence.   - At this point, there is a minimal risk of recurrence of his RCC given the time since surgery without the use of adjuvant immunotherapy. There is no suspicious adenopathy or other features on his most recent imaging studies.    - Will continue to monitor with imaging planned for prostate cancer.      5. Sensory neuropathy, Toes/feet Grade 2  - continue monitor for worsening with cabazitaxel.   - Continue acupuncture once weekly. Patient working with insurance to try to increase to twice a week.   - He is on gabapentin managed by palliative care   - Started duloxetine with improvement in pain/burning sensation and improvement in sensation.     6. Anemia  - likely secondary to chemotherapy. Iron levels adequate on 4/22/24. MCV elevated.  Folate and B12 adequate.  Retic appropriately elevated.      7. Left lower extremity swelling   - US 4/22/24 negative for DVT. CT abdomen pelvis 4/29 without significant or new lymphadenopathy. Continue compression stocking. Following with lymphedema therapy.    PLAN:   1. Proceed with C8 of carboplatin/cabazitaxel.  20% dose reduction of cabazi.  Consider dose reduction of carbo next if neuropathy is worse.    2. Continue acupuncture, gabapentin and duloxetine for neuropathy.     3. Return in 3 weeks for C9.    4. Restaging in 3 months with CT CAP and NM bone scan.  Sooner if new pain or symptoms.          The longitudinal plan of care for the diagnosis(es)/condition(s) as documented were addressed during this visit. Due to the added complexity in care, I will continue to support Walter in the subsequent  management and with ongoing continuity of care.      *** minutes spent on the date of the encounter doing chart review, review of test results, interpretation of tests, patient visit, and documentation       LATESHA Humphreys [facility] Followup  Aug 5, 2024    REASON FOR VISIT: Follow-up for metastatic castration-resistant prostate cancer.      INTERVAL HISTORY:   Increased Cymbalta,     Taking compazine regularly for 3-4 days, has zofran if needed       Started cymbalta which is providing relief with neuropathy. The sharp pains and burning sensation are gone. He can also now feel his right shin again.     The first week of chemo cycle he is nauseated with a reduced appetite and more fatigued. Taking compazine scheduled keeps nausea under control, also has Zofran if needed. Appetite and oral intake improve after the first week. Denies any bowel concerns.     Having mild low back pain today, attributes this to riding his e-bike a lot lately which has kept him active.     Left leg swelling is stable, occasionally hurts like his skin is stretching. Scheduled for a fitting for compression later this week. No redness or pain with ambulation.       Review of Systems:  Remainder of 12 point ROS reviewed and otherwise negative except as in interval history.       ONCOLOGY HISTORY: Mr. 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 His oncologic history is detailed below.     1. 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 Naylor at [facility]. Prior PSA were 1.4 on 8/16/17, 2.4 on 6/28/16, 2.9 on 6/28/16,  "and as low as 0.4 on 3/26/2003.   - 1/08/2019: Consultation with Sarah Wilson CNP in Urology clinic. Repeat PSA 9.6.  - 1/16/2019: MRI prostate with contrast - \"This examination is characterized as PIRADS 5- very high probability. Clinically significant cancer is highly likely to be present. There is a large, invasive mass arising from the right peripheral zone and extending into the neurovascular bundle, seminal vesicle, and along the anterolateral right mesorectal fascia. Metastatic right external iliac lymph node. Metastatic lesion in the posterior/superior right acetabulum.\"  - 1/25/2019: CT abdomen and pelvis with IV contrast - \"1. Heterogeneous enhancing mass posteriorly in the upper pole of the right kidney measures 4.5 x 5.8 x 5.7 cm (AP by transverse by craniocaudal). It has a small nodular component extending posterior medially which abuts the right psoas muscle. This nodular extension measures 2.1 x 2.1 cm. Minimal stranding about the mass. No definite thrombus within the right renal vein. This renal mass is compatible with a renal cell carcinoma. A paraaortic lymph node situated immediately posterior to the left renal vein measures 1.1 cm in short axis, suspicious for metastasis. 2. A 2 cm right external iliac lymph node is also suspicious for metastases. 3. Multiple sclerotic osseous lesions suspicious for metastases. These include 0.8 and 0.6 cm sclerotic lesions laterally in the right iliac wing (images 53 and 62 respectively). Ill-defined groundglass density laterally in the right acetabulum measuring 1.7 x 1.2 cm corresponds with the lesion identified on MRI. A 0.4 cm groundglass density in the left acetabulum. Sclerotic lesion in the left femoral neck measures 0.9 x 1.6 cm (image 81). Sclerotic metastases would be more compatible with prostate metastases. 4. A 3 subcentimeter hepatic lesions are indeterminate. Metastases would be a consideration. These can be further characterized with liver " "MRI.\"  - 1/25/2019: NM bone scan - \"There is focal bony uptake in the left femoral neck, right acetabulum, right of midline at the S1 or L5 level of the spine, within multiple bilateral ribs, in the C7 or T1 level of the spine, and anteriorly within the skull. Findings are suspicious for metastases.\"  - 1/31/19: CT chest with contrast - \" No suspicious nodules in the chest.  Stable appearance of a 5.8 cm right renal mass concerning for renal carcinoma until proven otherwise.  Stable indeterminant subcentimeter hypodensities in the liver.  Multifocal osteoblastic metastasis including 2.5 cm lesion in the right fifth rib posteriorly and a 1.6 cm lesion in the right third rib posteriorly. No lytic lesions identified.\"  - 2/6/19: CT-guided right sclerotic fifth rib lesion biopsy - \"Metastatic carcinoma, consistent with prostate primary.  Immunohistochemical stains performed show the metastatic carcinoma stains positive for NKX3.1 (prostate marker), negative for STACIE 3 and PAX8, which supports the above diagnosis.\"  - 2/15/19: Started Casodex 50mg every day and consented for the biobanking protocol. 3/18/19 - stopped Casodex.  - 2/19/19: Case discussed in tumor board - recommendation for nephectomy for suspected malignant right renal mass.   - 2/26/19: Started Lupron 22.5mg every 3 months.   - 5/8/19: Started Docetaxel 75mg/m2 IV every 3 weeks. 06/18/19 - cycle 3, 7/10/19 - cycle 4, 07/31/19 - cycle 5, 08/21/19 - cycle 6.   - 10/2/19: Restaging CT CAP and NM Bone Scan showed improved osseous disease, no evidence of recurrent or new metastatic disease.  - 1/5/20: Restaging CT CAP and NM Bone Scan showed stable osseous disease, no evidence of recurrent or new metastatic disease.  - 4/6/20: NM bone scan with slightly improved uptake in known skeletal mets. CT C/A/P with contrast with stable osseous mets, no yusuf enlargement, no new visceral mets etc.  - 04/08/20: Zometa every 3 months.  - 10/5/20: CT C/A/P with contrast " "and NM bone scan - SD.   - 1/4/21: CT C/A/P with contrast and NM bone scan - SD but slight increase in right 5th rib tracer uptake and in posterior L5 sclerosis. 1/6/21  PSA = 0.29  - 03/29/21: CT C/A/P with contrast - single new right sacral 8mm bone lesion (suspicious), other bone mets stable. No visceral/yusuf disease. Nephrectomy site without recurrence. NM bone scan - SD but \"increased uptake associated with the prior lesions of the lower cervical spine, posterior right fourth rib and right L5 posterior arch elements. Otherwise unchanged uptake associated with the proximal left femur and left anterior fourth rib. Similar uptake of the left halux, possibly secondary to degenerative osteoarthritis or gout.\"    3/31/21 PSA = 0.44  7/7/21  PSA = 0.47  11/2021 PSA = 1.05  -- Start XTANDI  12/16/21 PSA =0.22  2/11/22 PSA= 0.50  3/22/22 PSA=0.72  5/5/2022 PSA=1.79  7/11/2022 PSA=2.16 --Start cycle 1 docetaxel   8/22/22 PSA = 1.36  9/12/22 PSA = 1.09  10/24/22 Cycle #6 of Docetaxel. End of treatment  1/9/23  PSA =0.66  3/20/23  PSA=1.54  4/21/23 PSA = 1.81  T=15  5/22/23 C1 Pluvicto   06/07/23          PSA = 1.42  7/18/23 PSA=1.75, C2 Pluvicto   8/28/23 Transfer of care initial visit for Tabby.  PSA 2.06.  C3 Pluvicto scheduled for 8/30.  Proceed with dose as planned.  MR L femur, ortho referral, PSMA PET ordered for prior to follow-up.    10/2/23 Palliative RT to L femur metastasis complete.  PSMA PET with mixed response.  PSA 2.21.  RT to L femur pending.  Continue with Dose #4 of Pluvicto despite mixed response to therapy.  Dex course for possible pain flare with RT.    11/8/23  Follow-up prior to Dose #5 Pluvicto.  More pain in chest/ribs/back. PSA 4.38.  Testosterone=3.  Rad onc referral for ribs.  Biopsy progressive lesion for progression on Pluvicto.  Cabazitaxel scheduled for follow-up appointment.  [facility]us blood testing unrevealing for targetable mutation.    11/27/23 Bx completed from R iliac crest but " "unrevealing for either PCa or RCC.  Start cabazitaxel C1D1.  PSA 2.81.    12/19/23 PSA = 3.66  1/8/24 PSA = 3.55  1/29/24 PSA = 3.07  2/19/24 PSA= 2.21-Start carboplatin/cabazitaxel.    3/11/24 PSA= C2 Carbo/cabazi.  PSA 1.99.    4/01/24 PSA= 1.74. C3 Carbo/cabazi.   4/22/24 PSA= 1.85. C4 Carbo/cabazi.   5/13/24 PSA = 2.06. C5 Carbo/cabazi.   6/3/24 PSA = 2.68, cycle 6 Carboplatin/cabazitaxel  6/24/24 PSA 3.51, C7 carboplatin/cabazitaxel.  Bony lesions stable on NM bone scan.  Reticulonodular densities noted in lungs as possible infection vs drug reaction.  Add chromogranin A to next labs.   7/15/24 PSA= 5.07, chromogranin A pending    Radiation history:   -C7         3,000 cGy       06 X      8/05/2021        8/18/2021        13       10  -2 Sacrum          2,500 cGy       18 X      4/12/2022        4/18/2022         6           -Sacrum retreat               700 cGy        18 X      2/28/2023        2/28/2023  -Left femur to 2000 cGy in 5 fractions, completed 10/10/2023   -bilateral posterior chest wall at an area of osseous involvement of the ribs and adjacent T4 and 5 spine, to be delivered to 2000 cGy in 5 fractions.  completed 12/13-12/19/23.    2. Stage III ([id number]), grade 3 of 4, clear-cell RCC of right kidney:  - Incidentally diagnosed as above.   - Underwent curative-intent robotic right radical nephrectomy with Dr. Wesley Cleveland on 3/19/19. No tumor spillage per op report.   - Path showed: \"Histologic Type: Clear cell renal cell carcinoma; Sarcomatoid Features: Not identified; Histologic Grade: Nucleolar grade 3 (WHO/ISUP). Extent Tumor Size: 4.3 cm. Microscopic Tumor Extension: Tumor extension into renal sinus (in vascular structures). Margins: Negative. Tumor Necrosis: Present; focal. Lymph-Vascular Invasion: Not identified.  Pathologic Staging (pTNM) Primary Tumor (pT): pT3a: Tumor extends into renal vein branches/renal sinus. Regional Lymph Nodes (pN): pNX. Number of Lymph Nodes Examined: 0 Distant " "Metastasis (pM): pM N/A.\"  - Restaging scans as above.  No current concerns for recurrence.      PAST MEDICAL HISTORY:  Past Medical History:   Diagnosis Date     Cancer of kidney, right (H)      Complication of anesthesia     slow wakeup      Malignant neoplasm of prostate metastatic to bone (H) 2/14/2019     Thyroid disease      CURRENT MEDICATIONS:   Current Outpatient Medications:      atorvastatin (LIPITOR) 80 MG tablet, Take 80 mg by mouth daily, Disp: , Rfl:      BERBERINE CHLORIDE PO, , Disp: , Rfl:      calcium citrate-vitamin D (CITRACAL) 315-250 MG-UNIT TABS per tablet, Take 500 mg by mouth 2 times daily With 800 Vd per pt, Disp: , Rfl:      cycloSPORINE (RESTASIS) 0.05 % ophthalmic emulsion, Place 1 drop into both eyes 2 times daily , Disp: , Rfl:      dexAMETHasone (DECADRON) 4 MG tablet, Take 2 tablets (8 mg) by mouth daily Take for 3 days, starting the day after chemo. Take with food., Disp: 6 tablet, Rfl: 2     dexAMETHasone (DECADRON) 4 MG tablet, Take 1 tablet (4 mg) by mouth daily, Disp: 7 tablet, Rfl: 2     DULoxetine (CYMBALTA) 30 MG capsule, Take 2 capsules (60 mg) by mouth daily, Disp: 60 capsule, Rfl: 0     fentaNYL (DURAGESIC) 25 mcg/hr 72 hr patch, Place 1 patch onto the skin every 72 hours remove old patch., Disp: 10 patch, Rfl: 0     furosemide (LASIX) 20 MG tablet, Take 1 tablet (20 mg) by mouth daily, Disp: 15 tablet, Rfl: 1     gabapentin (NEURONTIN) 300 MG capsule, Take 1 capsule (300 mg) by mouth every morning AND 2 capsules (600 mg) daily at 2 pm AND 2 capsules (600 mg) at bedtime., Disp: 150 capsule, Rfl: 3     levothyroxine (SYNTHROID/LEVOTHROID) 112 MCG tablet, Take 112 mcg by mouth daily, Disp: , Rfl:      lidocaine, viscous, (XYLOCAINE) 2 % solution, Swish and swallow 15 mLs in mouth every 3 hours as needed for pain (before meals and at bedtime) ; Max 8 doses/24 hour period., Disp: 100 mL, Rfl: 1     loratadine (CLARITIN) 10 MG tablet, Take 10 mg by mouth daily, Disp: , Rfl: "      Multiple Vitamins-Minerals (MULTIVITAMIN ADULT EXTRA C PO), Take 1 tablet by mouth every 24 hours, Disp: , Rfl:      naloxone (NARCAN) 4 MG/0.1ML nasal spray, Spray 1 spray (4 mg) into one nostril alternating nostrils as needed for opioid reversal every 2-3 minutes until assistance arrives, Disp: 0.2 mL, Rfl: 1     Nutritional Supplements (SALMON OIL) CAPS, Take 2 capsules by mouth daily , Disp: , Rfl:      omeprazole (PRILOSEC) 20 MG DR capsule, Take 20 mg by mouth daily, Disp: , Rfl:      ondansetron (ZOFRAN) 8 MG tablet, Take 1 tablet (8 mg) by mouth every 8 hours as needed for nausea (vomiting), Disp: 30 tablet, Rfl: 2     ondansetron (ZOFRAN) 8 MG tablet, Take 1 tablet (8 mg) by mouth every 8 hours as needed for nausea, Disp: 30 tablet, Rfl: 4     oxyCODONE (ROXICODONE) 5 MG tablet, Take 1-2 tablets (5-10 mg) by mouth every 3 hours as needed for severe pain, Disp: 90 tablet, Rfl: 0     predniSONE (DELTASONE) 5 MG tablet, Take 1 tablet (5 mg) by mouth 2 times daily (with meals) for 21 days, Disp: 42 tablet, Rfl: 0     prochlorperazine (COMPAZINE) 10 MG tablet, Take 1 tablet (10 mg) by mouth every 6 hours as needed for nausea or vomiting, Disp: 30 tablet, Rfl: 5     tamsulosin (FLOMAX) 0.4 MG capsule, Take 1 capsule (0.4 mg) by mouth daily, Disp: 30 capsule, Rfl: 3     triamterene-HCTZ (MAXZIDE-25) 37.5-25 MG tablet, Take 1 tablet by mouth daily, Disp: , Rfl:       Physical Exam:   There were no vitals taken for this visit.  Wt Readings from Last 10 Encounters:   07/24/24 113.4 kg (250 lb)   07/15/24 113.9 kg (251 lb)   07/03/24 114.8 kg (253 lb)   06/24/24 114.7 kg (252 lb 14.4 oz)   06/03/24 116 kg (255 lb 12.8 oz)   05/13/24 117.9 kg (259 lb 14.4 oz)   04/22/24 118.3 kg (260 lb 14.4 oz)   04/01/24 119.4 kg (263 lb 3.2 oz)   03/28/24 116.1 kg (256 lb)   03/11/24 119.3 kg (263 lb 1.6 oz)   General: The patient is a pleasant male in no acute distress.  HEENT: EOMI. Sclerae are anicteric. Mucous membranes  moist without lesions or thrush.   Lymph: Neck is supple with no lymphadenopathy in the cervical or supraclavicular areas.   Heart: Regular rate and rhythm.   Lungs: normal work of breathing on RA, clear to auscultation throughout   Abdomen: Bowel sounds present, soft, non-distended.    Extremities:trace pitting lower extremity edema in the left leg into the thigh, trace right sided edema.   Neuro: Cranial nerves II through XII are grossly intact.  Skin: No rashes, petechiae, or bruising noted on exposed skin.  Psych: affect bright, alert and oriented    LABORATORY DATA:  Most Recent 3 CBC's:  Recent Labs   Lab Test 07/15/24  0628 06/24/24  0754 06/03/24  0752   WBC 3.7* 3.9* 4.1   HGB 9.3* 10.0* 10.0*   * 106* 105*   * 157 139*   ANEUTAUTO 2.6 2.9 2.8     Most Recent 3 BMP's:  Recent Labs   Lab Test 07/15/24  0628 06/24/24  0754 06/03/24  0752    141 140   POTASSIUM 3.9 4.5 3.9   CHLORIDE 107 108* 106   CO2 25 23 24   BUN 11.1 16.0 14.6   CR 0.95 1.01 1.00   ANIONGAP 9 10 10   BETHANY 8.9 8.7* 8.5*   * 108* 130*   PROTTOTAL 6.2* 6.3* 6.2*   ALBUMIN 4.0 4.0 3.9    Most Recent 3 LFT's:  Recent Labs   Lab Test 07/15/24  0628 06/24/24  0754 06/03/24  0752   AST 23 21 22   ALT 18 16 18   ALKPHOS 73 80 73   BILITOTAL 0.3 0.3 0.3     PSA Tumor Marker   Date Value Ref Range Status   07/15/2024 5.07 (H) 0.00 - 4.50 ng/mL Final   06/24/2024 3.51 0.00 - 4.50 ng/mL Final   06/03/2024 2.68 0.00 - 4.50 ng/mL Final   05/13/2024 2.06 0.00 - 4.50 ng/mL Final   04/22/2024 1.85 0.00 - 4.50 ng/mL Final   08/22/2022 1.36 0.00 - 4.00 ug/L Final   08/01/2022 1.78 0.00 - 4.00 ug/L Final   07/11/2022 2.16 0.00 - 4.00 ug/L Final   05/05/2022 1.79 0.00 - 4.00 ug/L Final   03/22/2022 0.72 0.00 - 4.00 ug/L Final         ASSESSMENT & PLAN: Mr. Reyes is a 64 year old gentleman with metastatic castration sensitive prostate adenocarcinoma as well as an incidentally diagnosed stage III clear-cell renal cell carcinoma  "of the right kidney (s/p radical R nephrectomy 3/19/19), who is here prior to cycle 3 cabazitaxel.      1. Metastatic castration-resistant prostate cancer, with involvement of the bones and RPLN:   - Patient met the criteria for CHAARTED \"high-volume\" metastatic hormone-sensitive prostate cancer. He opted for docetaxel in combination with ADT (per JOSEFA, [id number], FELIPEEDE). He was subsequently treated with docetaxel x6 doses in the [facility] setting and enzalutamide.  He initiated Pluvicto in May 2023 with an initial slight decline in PSA, but this began to rise just prior to Cycle 3.  Given his XR evidence of progression in L femur and PSA rise, we re-evaluated his progression with PSMA PET scan in September 2023, with note of mixed response.  Canceled Pluvicto Dose for November due to symptomatic progression with PSA rise. PSA is labile. Biopsy results from 11/21 was unrevealing for evaluation of NEPC vs small cell given progression without a significant rise in PSA. [facility] peripheral blood mutation analysis is unrevealing for targetable mutations. Cabazitaxel started 11/27/23 given progression on Pluvicto. Carboplatin added 2/2024 and cabazitaxel dose reduced by 20% in April 2024 due to neuropathy.  Restaging with pulmonary consolidative findings without mass-like features.    -Chromogranin A drawn today, pending.   -Feeling well, no new concerns today.   -Lupron due today, next will be due 10/7/2024  -Cabazitaxel to be continued at 20% dose reduction.     2. Pressure in chest, resolved:  Has an ongoing sensation of pressure/need to cough in his chest, primarily against his sternum. Vital signs stable, negative pulmonary physical exam. No chest pain.     3. Bone metastases:    - Noted to have osseous metastatic disease at the time of diagnosis. S/p radiation to C spine and sacrum (Re-irradiation to sacrum).   - pain mgmt per palliative care, pain is now well controlled with fentanyl patch. Dexamethasone burst " is helpful for pain flares and he has these available as needed.    - RT to L femur 10/2023.  RT for bilateral posterior ribs 12/2023 with Dr. Albert.  - Encouraged to continue calcium and vitamin D supplementation; continue Zometa 4mg IV every ~3 months.   - Last Zometa given 5/20/24.  Next due August 2024.       4. Stage 3, UISS-high risk ccRCC: s/p R nephrectomy   - Nephrectomy 3/19/19 and noted incidentally.  He is now 4.5 years post-op without evidence for recurrence.   - At this point, there is a minimal risk of recurrence of his RCC given the time since surgery without the use of adjuvant immunotherapy. There is no suspicious adenopathy or other features on his most recent imaging studies.    - Will continue to monitor with imaging planned for prostate cancer.      5. Sensory neuropathy, Toes/feet Grade 2  - continue monitor for worsening with cabazitaxel.   - Continue acupuncture once weekly. Patient working with insurance to try to increase to twice a week.   - He is on gabapentin managed by palliative care   - Started duloxetine with improvement in pain/burning sensation and improvement in sensation.     6. Anemia  - likely secondary to chemotherapy. Iron levels adequate on 4/22/24. MCV elevated.  Folate and B12 adequate.  Retic appropriately elevated.      7. Left lower extremity swelling   - US 4/22/24 negative for DVT. CT abdomen pelvis 4/29 without significant or new lymphadenopathy. Continue compression stocking. Following with lymphedema therapy.    PLAN:   1. Proceed with C8 of carboplatin/cabazitaxel.  20% dose reduction of cabazi.  Consider dose reduction of carbo next if neuropathy is worse.    2. Continue acupuncture, gabapentin and duloxetine for neuropathy.     3. Return in 3 weeks for C9.    4. Restaging in 3 months with CT CAP and NM bone scan.  Sooner if new pain or symptoms.          The longitudinal plan of care for the diagnosis(es)/condition(s) as documented were addressed during this  visit. Due to the added complexity in care, I will continue to support Walter in the subsequent management and with ongoing continuity of care.      *** minutes spent on the date of the encounter doing chart review, review of test results, interpretation of tests, patient visit, and documentation       LATESHA Humphreys CNP            Again, thank you for allowing me to participate in the care of your patient.        Sincerely,        LATESHA Humphreys CNP

## 2024-08-07 LAB — TESTOST SERPL-MCNC: <2 NG/DL (ref 240–950)

## 2024-08-26 ENCOUNTER — MYC MEDICAL ADVICE (OUTPATIENT)
Dept: PALLIATIVE MEDICINE | Facility: CLINIC | Age: 64
End: 2024-08-26
Payer: COMMERCIAL

## 2024-08-26 ENCOUNTER — INFUSION THERAPY VISIT (OUTPATIENT)
Dept: ONCOLOGY | Facility: CLINIC | Age: 64
End: 2024-08-26
Attending: STUDENT IN AN ORGANIZED HEALTH CARE EDUCATION/TRAINING PROGRAM
Payer: COMMERCIAL

## 2024-08-26 ENCOUNTER — APPOINTMENT (OUTPATIENT)
Dept: LAB | Facility: CLINIC | Age: 64
End: 2024-08-26
Attending: STUDENT IN AN ORGANIZED HEALTH CARE EDUCATION/TRAINING PROGRAM
Payer: COMMERCIAL

## 2024-08-26 VITALS
OXYGEN SATURATION: 96 % | BODY MASS INDEX: 33.82 KG/M2 | WEIGHT: 249.4 LBS | HEART RATE: 73 BPM | RESPIRATION RATE: 16 BRPM | SYSTOLIC BLOOD PRESSURE: 118 MMHG | TEMPERATURE: 98.9 F | DIASTOLIC BLOOD PRESSURE: 76 MMHG

## 2024-08-26 DIAGNOSIS — C61 PROSTATE CANCER (H): ICD-10-CM

## 2024-08-26 DIAGNOSIS — G89.3 CANCER ASSOCIATED PAIN: ICD-10-CM

## 2024-08-26 DIAGNOSIS — C61 MALIGNANT NEOPLASM OF PROSTATE METASTATIC TO BONE (H): Primary | ICD-10-CM

## 2024-08-26 DIAGNOSIS — T45.1X5A CHEMOTHERAPY-INDUCED PERIPHERAL NEUROPATHY (H): ICD-10-CM

## 2024-08-26 DIAGNOSIS — G62.0 CHEMOTHERAPY-INDUCED PERIPHERAL NEUROPATHY (H): ICD-10-CM

## 2024-08-26 DIAGNOSIS — Z76.89 PREVENTION OF CHEMOTHERAPY-INDUCED NEUTROPENIA: ICD-10-CM

## 2024-08-26 DIAGNOSIS — G89.3 PAIN FROM BONE METASTASES (H): ICD-10-CM

## 2024-08-26 DIAGNOSIS — C79.51 MALIGNANT NEOPLASM OF PROSTATE METASTATIC TO BONE (H): Primary | ICD-10-CM

## 2024-08-26 DIAGNOSIS — C79.51 PAIN FROM BONE METASTASES (H): ICD-10-CM

## 2024-08-26 LAB
ALBUMIN SERPL BCG-MCNC: 4 G/DL (ref 3.5–5.2)
ALP SERPL-CCNC: 112 U/L (ref 40–150)
ALT SERPL W P-5'-P-CCNC: 20 U/L (ref 0–70)
ANION GAP SERPL CALCULATED.3IONS-SCNC: 13 MMOL/L (ref 7–15)
AST SERPL W P-5'-P-CCNC: 23 U/L (ref 0–45)
BASOPHILS # BLD AUTO: 0 10E3/UL (ref 0–0.2)
BASOPHILS NFR BLD AUTO: 0 %
BILIRUB SERPL-MCNC: 0.3 MG/DL
BUN SERPL-MCNC: 15.2 MG/DL (ref 8–23)
CALCIUM SERPL-MCNC: 8.9 MG/DL (ref 8.8–10.4)
CHLORIDE SERPL-SCNC: 105 MMOL/L (ref 98–107)
CREAT SERPL-MCNC: 0.91 MG/DL (ref 0.67–1.17)
EGFRCR SERPLBLD CKD-EPI 2021: >90 ML/MIN/1.73M2
EOSINOPHIL # BLD AUTO: 0 10E3/UL (ref 0–0.7)
EOSINOPHIL NFR BLD AUTO: 0 %
ERYTHROCYTE [DISTWIDTH] IN BLOOD BY AUTOMATED COUNT: 16.5 % (ref 10–15)
GLUCOSE SERPL-MCNC: 123 MG/DL (ref 70–99)
HCO3 SERPL-SCNC: 23 MMOL/L (ref 22–29)
HCT VFR BLD AUTO: 31.4 % (ref 40–53)
HGB BLD-MCNC: 10.1 G/DL (ref 13.3–17.7)
IMM GRANULOCYTES # BLD: 0 10E3/UL
IMM GRANULOCYTES NFR BLD: 0 %
LYMPHOCYTES # BLD AUTO: 0.8 10E3/UL (ref 0.8–5.3)
LYMPHOCYTES NFR BLD AUTO: 17 %
MCH RBC QN AUTO: 34.4 PG (ref 26.5–33)
MCHC RBC AUTO-ENTMCNC: 32.2 G/DL (ref 31.5–36.5)
MCV RBC AUTO: 107 FL (ref 78–100)
MONOCYTES # BLD AUTO: 0.6 10E3/UL (ref 0–1.3)
MONOCYTES NFR BLD AUTO: 12 %
NEUTROPHILS # BLD AUTO: 3.4 10E3/UL (ref 1.6–8.3)
NEUTROPHILS NFR BLD AUTO: 71 %
NRBC # BLD AUTO: 0 10E3/UL
NRBC BLD AUTO-RTO: 0 /100
PLATELET # BLD AUTO: 124 10E3/UL (ref 150–450)
POTASSIUM SERPL-SCNC: 3.7 MMOL/L (ref 3.4–5.3)
PROT SERPL-MCNC: 6.5 G/DL (ref 6.4–8.3)
PSA SERPL DL<=0.01 NG/ML-MCNC: 9.88 NG/ML (ref 0–4.5)
RBC # BLD AUTO: 2.94 10E6/UL (ref 4.4–5.9)
SODIUM SERPL-SCNC: 141 MMOL/L (ref 135–145)
WBC # BLD AUTO: 4.8 10E3/UL (ref 4–11)

## 2024-08-26 PROCEDURE — 36591 DRAW BLOOD OFF VENOUS DEVICE: CPT

## 2024-08-26 PROCEDURE — 85025 COMPLETE CBC W/AUTO DIFF WBC: CPT

## 2024-08-26 PROCEDURE — 96375 TX/PRO/DX INJ NEW DRUG ADDON: CPT

## 2024-08-26 PROCEDURE — 96417 CHEMO IV INFUS EACH ADDL SEQ: CPT

## 2024-08-26 PROCEDURE — 96377 APPLICATON ON-BODY INJECTOR: CPT | Mod: 59

## 2024-08-26 PROCEDURE — 250N000011 HC RX IP 250 OP 636

## 2024-08-26 PROCEDURE — 84403 ASSAY OF TOTAL TESTOSTERONE: CPT

## 2024-08-26 PROCEDURE — G2211 COMPLEX E/M VISIT ADD ON: HCPCS

## 2024-08-26 PROCEDURE — 258N000003 HC RX IP 258 OP 636

## 2024-08-26 PROCEDURE — 99213 OFFICE O/P EST LOW 20 MIN: CPT

## 2024-08-26 PROCEDURE — 99214 OFFICE O/P EST MOD 30 MIN: CPT

## 2024-08-26 PROCEDURE — 96367 TX/PROPH/DG ADDL SEQ IV INF: CPT

## 2024-08-26 PROCEDURE — 96413 CHEMO IV INFUSION 1 HR: CPT

## 2024-08-26 PROCEDURE — 84153 ASSAY OF PSA TOTAL: CPT

## 2024-08-26 PROCEDURE — 80053 COMPREHEN METABOLIC PANEL: CPT

## 2024-08-26 PROCEDURE — 96372 THER/PROPH/DIAG INJ SC/IM: CPT

## 2024-08-26 RX ORDER — HEPARIN SODIUM (PORCINE) LOCK FLUSH IV SOLN 100 UNIT/ML 100 UNIT/ML
5 SOLUTION INTRAVENOUS ONCE
Status: COMPLETED | OUTPATIENT
Start: 2024-08-26 | End: 2024-08-26

## 2024-08-26 RX ORDER — LORAZEPAM 2 MG/ML
0.5 INJECTION INTRAMUSCULAR EVERY 4 HOURS PRN
Status: CANCELLED | OUTPATIENT
Start: 2024-08-26

## 2024-08-26 RX ORDER — HEPARIN SODIUM,PORCINE 10 UNIT/ML
5-20 VIAL (ML) INTRAVENOUS DAILY PRN
Status: CANCELLED | OUTPATIENT
Start: 2024-08-26

## 2024-08-26 RX ORDER — HEPARIN SODIUM (PORCINE) LOCK FLUSH IV SOLN 100 UNIT/ML 100 UNIT/ML
5 SOLUTION INTRAVENOUS
Status: DISCONTINUED | OUTPATIENT
Start: 2024-08-26 | End: 2024-08-26 | Stop reason: HOSPADM

## 2024-08-26 RX ORDER — PALONOSETRON 0.05 MG/ML
0.25 INJECTION, SOLUTION INTRAVENOUS ONCE
Status: CANCELLED | OUTPATIENT
Start: 2024-08-26

## 2024-08-26 RX ORDER — ZOLEDRONIC ACID 0.04 MG/ML
4 INJECTION, SOLUTION INTRAVENOUS ONCE
OUTPATIENT
Start: 2024-11-18 | End: 2024-11-18

## 2024-08-26 RX ORDER — ALBUTEROL SULFATE 0.83 MG/ML
2.5 SOLUTION RESPIRATORY (INHALATION)
Status: CANCELLED | OUTPATIENT
Start: 2024-08-26

## 2024-08-26 RX ORDER — METHYLPREDNISOLONE SODIUM SUCCINATE 125 MG/2ML
125 INJECTION, POWDER, LYOPHILIZED, FOR SOLUTION INTRAMUSCULAR; INTRAVENOUS
Status: CANCELLED
Start: 2024-08-26

## 2024-08-26 RX ORDER — PALONOSETRON 0.05 MG/ML
0.25 INJECTION, SOLUTION INTRAVENOUS ONCE
Status: COMPLETED | OUTPATIENT
Start: 2024-08-26 | End: 2024-08-26

## 2024-08-26 RX ORDER — PREDNISONE 5 MG/1
5-10 TABLET ORAL 2 TIMES DAILY WITH MEALS
Qty: 84 TABLET | Refills: 0 | Status: SHIPPED | OUTPATIENT
Start: 2024-08-26 | End: 2024-09-16

## 2024-08-26 RX ORDER — ALBUTEROL SULFATE 90 UG/1
1-2 AEROSOL, METERED RESPIRATORY (INHALATION)
Status: CANCELLED
Start: 2024-08-26

## 2024-08-26 RX ORDER — DULOXETIN HYDROCHLORIDE 60 MG/1
60 CAPSULE, DELAYED RELEASE ORAL DAILY
Qty: 90 CAPSULE | Refills: 1 | Status: SHIPPED | OUTPATIENT
Start: 2024-08-26

## 2024-08-26 RX ORDER — HEPARIN SODIUM,PORCINE 10 UNIT/ML
5 VIAL (ML) INTRAVENOUS
OUTPATIENT
Start: 2024-11-18

## 2024-08-26 RX ORDER — HEPARIN SODIUM (PORCINE) LOCK FLUSH IV SOLN 100 UNIT/ML 100 UNIT/ML
5 SOLUTION INTRAVENOUS
OUTPATIENT
Start: 2024-11-18

## 2024-08-26 RX ORDER — DIPHENHYDRAMINE HYDROCHLORIDE 50 MG/ML
50 INJECTION INTRAMUSCULAR; INTRAVENOUS
Status: CANCELLED
Start: 2024-08-26

## 2024-08-26 RX ORDER — EPINEPHRINE 1 MG/ML
0.3 INJECTION, SOLUTION INTRAMUSCULAR; SUBCUTANEOUS EVERY 5 MIN PRN
Status: CANCELLED | OUTPATIENT
Start: 2024-08-26

## 2024-08-26 RX ORDER — ZOLEDRONIC ACID 0.04 MG/ML
4 INJECTION, SOLUTION INTRAVENOUS ONCE
Status: COMPLETED | OUTPATIENT
Start: 2024-08-26 | End: 2024-08-26

## 2024-08-26 RX ORDER — MEPERIDINE HYDROCHLORIDE 25 MG/ML
25 INJECTION INTRAMUSCULAR; INTRAVENOUS; SUBCUTANEOUS EVERY 30 MIN PRN
Status: CANCELLED | OUTPATIENT
Start: 2024-08-26

## 2024-08-26 RX ORDER — PREDNISONE 5 MG/1
5 TABLET ORAL 2 TIMES DAILY WITH MEALS
Qty: 42 TABLET | Refills: 0 | Status: SHIPPED | OUTPATIENT
Start: 2024-08-26 | End: 2024-08-26

## 2024-08-26 RX ORDER — HEPARIN SODIUM (PORCINE) LOCK FLUSH IV SOLN 100 UNIT/ML 100 UNIT/ML
5 SOLUTION INTRAVENOUS
Status: CANCELLED | OUTPATIENT
Start: 2024-08-26

## 2024-08-26 RX ADMIN — FAMOTIDINE 20 MG: 10 INJECTION INTRAVENOUS at 13:47

## 2024-08-26 RX ADMIN — Medication 5 ML: at 16:00

## 2024-08-26 RX ADMIN — ZOLEDRONIC ACID 4 MG: 0.04 INJECTION, SOLUTION INTRAVENOUS at 13:22

## 2024-08-26 RX ADMIN — DIPHENHYDRAMINE HYDROCHLORIDE 25 MG: 50 INJECTION, SOLUTION INTRAMUSCULAR; INTRAVENOUS at 13:45

## 2024-08-26 RX ADMIN — Medication 5 ML: at 11:28

## 2024-08-26 RX ADMIN — PALONOSETRON HYDROCHLORIDE 0.25 MG: 0.25 INJECTION INTRAVENOUS at 13:48

## 2024-08-26 RX ADMIN — SODIUM CHLORIDE 38 MG: 9 INJECTION, SOLUTION INTRAVENOUS at 14:13

## 2024-08-26 RX ADMIN — SODIUM CHLORIDE 250 ML: 9 INJECTION, SOLUTION INTRAVENOUS at 13:21

## 2024-08-26 RX ADMIN — FOSAPREPITANT: 150 INJECTION, POWDER, LYOPHILIZED, FOR SOLUTION INTRAVENOUS at 13:50

## 2024-08-26 RX ADMIN — CARBOPLATIN 550 MG: 10 INJECTION, SOLUTION INTRAVENOUS at 15:25

## 2024-08-26 RX ADMIN — PEGFILGRASTIM 6 MG: KIT SUBCUTANEOUS at 15:27

## 2024-08-26 ASSESSMENT — PAIN SCALES - GENERAL: PAINLEVEL: MILD PAIN (2)

## 2024-08-26 NOTE — Clinical Note
8/26/2024      Walter Reyes  [id number]  [facility] [address]      Dear Colleague,    Thank you for referring your patient, Walter Reyes, to the [facility]. Please see a copy of my visit note below.        [facility] Oncology Followup  Aug 26, 2024    REASON FOR VISIT: Follow-up for metastatic castration-resistant prostate cancer.      INTERVAL HISTORY:     Neuropathy   Nausea/appetite  Swelling     More fatigued, slept for     Neuropathy is worse, feet and ankles are getting ahrd to move  Pain is overall getting worse, overall 2/10 in all spots - back, forehead right ribs, femur especially with increased swelling   Compression device at night helps with femur swelling and pain the next day   -overall feeling more run down, doesn't have energy to do a lot of activity, things wear him out faster than bfeore  Short of breath climbing stairs         ***     Increased Cymbalta which provided further relief with his neuropathy right away. He tried weaning off of gabapentin and started having more rib, back and leg pain. He has increased his gabapentin dose back up to where it was and plans to follow up with palliative care team.     He continues to ride his bike often and remains active.     He is fatigued for a few days following chemo. Last cycle, he slept the entire day on day 4, but then felt recovered that night, which is a lot sooner than with previous cycles.     Taking compazine regularly for 3-4 days which controls his nausea, has zofran if needed. Appetite is still decreased, he is eating smaller amounts and trying to eat more frequently. No bowel or bladder concerns.     Lower extremity swelling is improved. Received his custom compression garments and that has been helpful in reducing swelling, but also decreasing discomfort and increasing his strength.     He denies any new concerns today.       Review of Systems:  Remainder of 12 point ROS  "reviewed and otherwise negative except as in interval history.       ONCOLOGY HISTORY: Mr. 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 His oncologic history is detailed below.     1. 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 Naylor at [facility]. Prior PSA were 1.4 on 8/16/17, 2.4 on 6/28/16, 2.9 on 6/28/16, and as low as 0.4 on 3/26/2003.   - 1/08/2019: Consultation with Sarah Wilson CNP in Urology clinic. Repeat PSA 9.6.  - 1/16/2019: MRI prostate with contrast - \"This examination is characterized as PIRADS 5- very high probability. Clinically significant cancer is highly likely to be present. There is a large, invasive mass arising from the right peripheral zone and extending into the neurovascular bundle, seminal vesicle, and along the anterolateral right mesorectal fascia. Metastatic right external iliac lymph node. Metastatic lesion in the posterior/superior right acetabulum.\"  - 1/25/2019: CT abdomen and pelvis with IV contrast - \"1. Heterogeneous enhancing mass posteriorly in the upper pole of the right kidney measures 4.5 x 5.8 x 5.7 cm (AP by transverse by craniocaudal). It has a small nodular component extending posterior medially which abuts the right psoas muscle. This nodular extension measures 2.1 x 2.1 cm. Minimal stranding about the mass. No definite thrombus within the right renal vein. This renal mass is compatible with a renal cell carcinoma. A paraaortic lymph node situated immediately posterior to the left renal vein measures 1.1 cm in short axis, suspicious for metastasis. 2. A 2 cm right external " "iliac lymph node is also suspicious for metastases. 3. Multiple sclerotic osseous lesions suspicious for metastases. These include 0.8 and 0.6 cm sclerotic lesions laterally in the right iliac wing (images 53 and 62 respectively). Ill-defined groundglass density laterally in the right acetabulum measuring 1.7 x 1.2 cm corresponds with the lesion identified on MRI. A 0.4 cm groundglass density in the left acetabulum. Sclerotic lesion in the left femoral neck measures 0.9 x 1.6 cm (image 81). Sclerotic metastases would be more compatible with prostate metastases. 4. A 3 subcentimeter hepatic lesions are indeterminate. Metastases would be a consideration. These can be further characterized with liver MRI.\"  - 1/25/2019: NM bone scan - \"There is focal bony uptake in the left femoral neck, right acetabulum, right of midline at the S1 or L5 level of the spine, within multiple bilateral ribs, in the C7 or T1 level of the spine, and anteriorly within the skull. Findings are suspicious for metastases.\"  - 1/31/19: CT chest with contrast - \" No suspicious nodules in the chest.  Stable appearance of a 5.8 cm right renal mass concerning for renal carcinoma until proven otherwise.  Stable indeterminant subcentimeter hypodensities in the liver.  Multifocal osteoblastic metastasis including 2.5 cm lesion in the right fifth rib posteriorly and a 1.6 cm lesion in the right third rib posteriorly. No lytic lesions identified.\"  - 2/6/19: CT-guided right sclerotic fifth rib lesion biopsy - \"Metastatic carcinoma, consistent with prostate primary.  Immunohistochemical stains performed show the metastatic carcinoma stains positive for NKX3.1 (prostate marker), negative for STACIE 3 and PAX8, which supports the above diagnosis.\"  - 2/15/19: Started Casodex 50mg every day and consented for the biobanking protocol. 3/18/19 - stopped Casodex.  - 2/19/19: Case discussed in tumor board - recommendation for nephectomy for suspected malignant right " "renal mass.   - 2/26/19: Started Lupron 22.5mg every 3 months.   - 5/8/19: Started Docetaxel 75mg/m2 IV every 3 weeks. 06/18/19 - cycle 3, 7/10/19 - cycle 4, 07/31/19 - cycle 5, 08/21/19 - cycle 6.   - 10/2/19: Restaging CT CAP and NM Bone Scan showed improved osseous disease, no evidence of recurrent or new metastatic disease.  - 1/5/20: Restaging CT CAP and NM Bone Scan showed stable osseous disease, no evidence of recurrent or new metastatic disease.  - 4/6/20: NM bone scan with slightly improved uptake in known skeletal mets. CT C/A/P with contrast with stable osseous mets, no yusuf enlargement, no new visceral mets etc.  - 04/08/20: Zometa every 3 months.  - 10/5/20: CT C/A/P with contrast and NM bone scan - SD.   - 1/4/21: CT C/A/P with contrast and NM bone scan - SD but slight increase in right 5th rib tracer uptake and in posterior L5 sclerosis. 1/6/21  PSA = 0.29  - 03/29/21: CT C/A/P with contrast - single new right sacral 8mm bone lesion (suspicious), other bone mets stable. No visceral/yusuf disease. Nephrectomy site without recurrence. NM bone scan - SD but \"increased uptake associated with the prior lesions of the lower cervical spine, posterior right fourth rib and right L5 posterior arch elements. Otherwise unchanged uptake associated with the proximal left femur and left anterior fourth rib. Similar uptake of the left halux, possibly secondary to degenerative osteoarthritis or gout.\"    3/31/21 PSA = 0.44  7/7/21  PSA = 0.47  11/2021 PSA = 1.05  -- Start XTANDI  12/16/21 PSA =0.22  2/11/22 PSA= 0.50  3/22/22 PSA=0.72  5/5/2022 PSA=1.79  7/11/2022 PSA=2.16 --Start cycle 1 docetaxel   8/22/22 PSA = 1.36  9/12/22 PSA = 1.09  10/24/22 Cycle #6 of Docetaxel. End of treatment  1/9/23  PSA =0.66  3/20/23  PSA=1.54  4/21/23 PSA = 1.81  T=15  5/22/23 C1 Pluvicto   06/07/23          PSA = 1.42  7/18/23 PSA=1.75, C2 Pluvicto   8/28/23 Transfer of care initial visit for [place].  PSA 2.06.  C3 Pluvicto scheduled " for 8/30.  Proceed with dose as planned.  MR L femur, ortho referral, PSMA PET ordered for prior to follow-up.    10/2/23 Palliative RT to L femur metastasis complete.  PSMA PET with mixed response.  PSA 2.21.  RT to L femur pending.  Continue with Dose #4 of Pluvicto despite mixed response to therapy.  Dex course for possible pain flare with RT.    11/8/23  Follow-up prior to Dose #5 Pluvicto.  More pain in chest/ribs/back. PSA 4.38.  Testosterone=3.  Rad onc referral for ribs.  Biopsy progressive lesion for progression on Pluvicto.  Cabazitaxel scheduled for follow-up appointment.  Tempus blood testing unrevealing for targetable mutation.    11/27/23 Bx completed from R iliac crest but unrevealing for either PCa or RCC.  Start cabazitaxel C1D1.  PSA 2.81.    12/19/23 PSA = 3.66  1/8/24 PSA = 3.55  1/29/24 PSA = 3.07  2/19/24 PSA= 2.21-Start carboplatin/cabazitaxel.    3/11/24 PSA= C2 Carbo/cabazi.  PSA 1.99.    4/01/24 PSA= 1.74. C3 Carbo/cabazi.   4/22/24 PSA= 1.85. C4 Carbo/cabazi.   5/13/24 PSA = 2.06. C5 Carbo/cabazi.   6/3/24 PSA = 2.68, cycle 6 Carboplatin/cabazitaxel  6/24/24 PSA 3.51, C7 carboplatin/cabazitaxel.  Bony lesions stable on NM bone scan.  Reticulonodular densities noted in lungs as possible infection vs drug reaction.  Add chromogranin A to next labs.   7/15/24 PSA= 5.07, chromogranin A pending, cycle 8  8/5/24 PSA= 7.12, Cycle 9 carboplatin/cabazitaxel    Radiation history:   -C7         3,000 cGy       06 X      8/05/2021        8/18/2021        13       10  -2 Sacrum          2,500 cGy       18 X      4/12/2022        4/18/2022         6           -Sacrum retreat               700 cGy        18 X      2/28/2023        2/28/2023  -Left femur to 2000 cGy in 5 fractions, completed 10/10/2023   -bilateral posterior chest wall at an area of osseous involvement of the ribs and adjacent T4 and 5 spine, to be delivered to 2000 cGy in 5 fractions.  completed 12/13-12/19/23.    2. Stage III  "([id number]), grade 3 of 4, clear-cell RCC of right kidney:  - Incidentally diagnosed as above.   - Underwent curative-intent robotic right radical nephrectomy with Dr. Wesley Cleveland on 3/19/19. No tumor spillage per op report.   - Path showed: \"Histologic Type: Clear cell renal cell carcinoma; Sarcomatoid Features: Not identified; Histologic Grade: Nucleolar grade 3 (WHO/ISUP). Extent Tumor Size: 4.3 cm. Microscopic Tumor Extension: Tumor extension into renal sinus (in vascular structures). Margins: Negative. Tumor Necrosis: Present; focal. Lymph-Vascular Invasion: Not identified.  Pathologic Staging (pTNM) Primary Tumor (pT): pT3a: Tumor extends into renal vein branches/renal sinus. Regional Lymph Nodes (pN): pNX. Number of Lymph Nodes Examined: 0 Distant Metastasis (pM): pM N/A.\"  - Restaging scans as above.  No current concerns for recurrence.      PAST MEDICAL HISTORY:  Past Medical History:   Diagnosis Date    Cancer of kidney, right (H)     Complication of anesthesia     slow wakeup     Malignant neoplasm of prostate metastatic to bone (H) 2/14/2019    Thyroid disease      CURRENT MEDICATIONS:   Current Outpatient Medications:     atorvastatin (LIPITOR) 80 MG tablet, Take 80 mg by mouth daily, Disp: , Rfl:     BERBERINE CHLORIDE PO, , Disp: , Rfl:     calcium citrate-vitamin D (CITRACAL) 315-250 MG-UNIT TABS per tablet, Take 500 mg by mouth 2 times daily With 800 Vd per pt, Disp: , Rfl:     cycloSPORINE (RESTASIS) 0.05 % ophthalmic emulsion, Place 1 drop into both eyes 2 times daily , Disp: , Rfl:     dexAMETHasone (DECADRON) 4 MG tablet, Take 2 tablets (8 mg) by mouth daily Take for 3 days, starting the day after chemo. Take with food., Disp: 6 tablet, Rfl: 2    dexAMETHasone (DECADRON) 4 MG tablet, Take 1 tablet (4 mg) by mouth daily, Disp: 7 tablet, Rfl: 2    DULoxetine (CYMBALTA) 30 MG capsule, Take 2 capsules (60 mg) by mouth daily, Disp: 60 capsule, Rfl: 0    fentaNYL (DURAGESIC) 25 mcg/hr 72 hr patch, " Place 1 patch onto the skin every 72 hours remove old patch., Disp: 10 patch, Rfl: 0    furosemide (LASIX) 20 MG tablet, Take 1 tablet (20 mg) by mouth daily, Disp: 15 tablet, Rfl: 1    gabapentin (NEURONTIN) 300 MG capsule, Take 1 capsule (300 mg) by mouth every morning AND 2 capsules (600 mg) daily at 2 pm AND 2 capsules (600 mg) at bedtime., Disp: 150 capsule, Rfl: 3    levothyroxine (SYNTHROID/LEVOTHROID) 112 MCG tablet, Take 112 mcg by mouth daily, Disp: , Rfl:     lidocaine, viscous, (XYLOCAINE) 2 % solution, Swish and swallow 15 mLs in mouth every 3 hours as needed for pain (before meals and at bedtime) ; Max 8 doses/24 hour period., Disp: 100 mL, Rfl: 1    loratadine (CLARITIN) 10 MG tablet, Take 10 mg by mouth daily, Disp: , Rfl:     Multiple Vitamins-Minerals (MULTIVITAMIN ADULT EXTRA C PO), Take 1 tablet by mouth every 24 hours, Disp: , Rfl:     naloxone (NARCAN) 4 MG/0.1ML nasal spray, Spray 1 spray (4 mg) into one nostril alternating nostrils as needed for opioid reversal every 2-3 minutes until assistance arrives, Disp: 0.2 mL, Rfl: 1    Nutritional Supplements (SALMON OIL) CAPS, Take 2 capsules by mouth daily , Disp: , Rfl:     omeprazole (PRILOSEC) 20 MG DR capsule, Take 20 mg by mouth daily, Disp: , Rfl:     ondansetron (ZOFRAN) 8 MG tablet, Take 1 tablet (8 mg) by mouth every 8 hours as needed for nausea (vomiting), Disp: 30 tablet, Rfl: 2    ondansetron (ZOFRAN) 8 MG tablet, Take 1 tablet (8 mg) by mouth every 8 hours as needed for nausea, Disp: 30 tablet, Rfl: 4    oxyCODONE (ROXICODONE) 5 MG tablet, Take 1-2 tablets (5-10 mg) by mouth every 3 hours as needed for severe pain, Disp: 90 tablet, Rfl: 0    predniSONE (DELTASONE) 5 MG tablet, Take 1 tablet (5 mg) by mouth 2 times daily (with meals) for 21 days, Disp: 42 tablet, Rfl: 0    prochlorperazine (COMPAZINE) 10 MG tablet, Take 1 tablet (10 mg) by mouth every 6 hours as needed for nausea or vomiting, Disp: 30 tablet, Rfl: 5    tamsulosin  (FLOMAX) 0.4 MG capsule, Take 1 capsule (0.4 mg) by mouth daily, Disp: 30 capsule, Rfl: 3    triamterene-HCTZ (MAXZIDE-25) 37.5-25 MG tablet, Take 1 tablet by mouth daily, Disp: , Rfl:       Physical Exam:   There were no vitals taken for this visit.  Wt Readings from Last 10 Encounters:   08/05/24 112 kg (247 lb)   07/24/24 113.4 kg (250 lb)   07/15/24 113.9 kg (251 lb)   07/03/24 114.8 kg (253 lb)   06/24/24 114.7 kg (252 lb 14.4 oz)   06/03/24 116 kg (255 lb 12.8 oz)   05/13/24 117.9 kg (259 lb 14.4 oz)   04/22/24 118.3 kg (260 lb 14.4 oz)   04/01/24 119.4 kg (263 lb 3.2 oz)   03/28/24 116.1 kg (256 lb)   General: The patient is a pleasant male in no acute distress.  HEENT: EOMI. Sclerae are anicteric. Mucous membranes moist without lesions or thrush.   Lymph: Neck is supple with no lymphadenopathy in the cervical or supraclavicular areas.   Heart: Regular rate and rhythm.   Lungs: normal work of breathing on RA, clear to auscultation throughout   Abdomen: Bowel sounds present, soft, non-distended.    Extremities:trace bilateral lower extremity edema   Neuro: Cranial nerves II through XII are grossly intact.  Skin: No rashes, petechiae, or bruising noted on exposed skin.  Psych: affect bright, alert and oriented    LABORATORY DATA:  Most Recent 3 CBC's:  Recent Labs   Lab Test 08/05/24  0825 07/15/24  0628 06/24/24  0754   WBC 4.6 3.7* 3.9*   HGB 10.4* 9.3* 10.0*   * 107* 106*   * 147* 157   ANEUTAUTO 3.3 2.6 2.9     Most Recent 3 BMP's:  Recent Labs   Lab Test 08/05/24  0825 07/15/24  0628 06/24/24  0754    141 141   POTASSIUM 3.6 3.9 4.5   CHLORIDE 107 107 108*   CO2 23 25 23   BUN 14.0 11.1 16.0   CR 0.92 0.95 1.01   ANIONGAP 12 9 10   BETHANY 8.9 8.9 8.7*   * 105* 108*   PROTTOTAL 6.5 6.2* 6.3*   ALBUMIN 4.1 4.0 4.0    Most Recent 3 LFT's:  Recent Labs   Lab Test 08/05/24  0825 07/15/24  0628 06/24/24  0754   AST 20 23 21   ALT 19 18 16   ALKPHOS 94 73 80   BILITOTAL 0.4 0.3 0.3  "      PSA Tumor Marker   Date Value Ref Range Status   08/05/2024 7.12 (H) 0.00 - 4.50 ng/mL Final   07/15/2024 5.07 (H) 0.00 - 4.50 ng/mL Final   06/24/2024 3.51 0.00 - 4.50 ng/mL Final   06/03/2024 2.68 0.00 - 4.50 ng/mL Final   05/13/2024 2.06 0.00 - 4.50 ng/mL Final   08/22/2022 1.36 0.00 - 4.00 ug/L Final   08/01/2022 1.78 0.00 - 4.00 ug/L Final   07/11/2022 2.16 0.00 - 4.00 ug/L Final   05/05/2022 1.79 0.00 - 4.00 ug/L Final   03/22/2022 0.72 0.00 - 4.00 ug/L Final         ASSESSMENT & PLAN: Mr. Reyes is a 64 year old gentleman with metastatic castration sensitive prostate adenocarcinoma as well as an incidentally diagnosed stage III clear-cell renal cell carcinoma of the right kidney (s/p radical R nephrectomy 3/19/19).     1. Metastatic castration-resistant prostate cancer, with involvement of the bones and RPLN:   - Patient met the criteria for CHAARTED \"high-volume\" metastatic hormone-sensitive prostate cancer. He opted for docetaxel in combination with ADT (per CHAARTED, [id number], STAMPEDE). He was subsequently treated with docetaxel x6 doses in the [facility] setting and enzalutamide.  He initiated Pluvicto in May 2023 with an initial slight decline in PSA, but this began to rise just prior to Cycle 3.  Given his XR evidence of progression in L femur and PSA rise, we re-evaluated his progression with PSMA PET scan in September 2023, with note of mixed response.  Canceled Pluvicto Dose for November due to symptomatic progression with PSA rise. PSA is labile. Biopsy results from 11/21 was unrevealing for evaluation of NEPC vs small cell given progression without a significant rise in PSA. Tempus peripheral blood mutation analysis is unrevealing for targetable mutations. Cabazitaxel started 11/27/23 given progression on Pluvicto. Carboplatin added 2/2024 and cabazitaxel dose reduced by 20% in April 2024 due to neuropathy.  Restaging with pulmonary consolidative findings without mass-like features.  " Chromogranin A 271 on 7/15/24.   -Feeling well, no new concerns today. Of note his PSA with slight increase again today, up to 7.12 ng/mL. Discussed with Dr. Mcnair, patient is clinically stable, will continue current regimen for now.   -Lupron every 3 months, next due 10/7/2024  -Cabazitaxel to be continued at 20% dose reduction.     2. Pressure in chest, resolved:  Has an ongoing sensation of pressure/need to cough in his chest, primarily against his sternum. Vital signs stable, negative pulmonary physical exam. No chest pain.     3. Bone metastases:    - Noted to have osseous metastatic disease at the time of diagnosis. S/p radiation to C spine and sacrum (Re-irradiation to sacrum).   - pain mgmt per palliative care, pain is now well controlled with fentanyl patch. Dexamethasone burst is helpful for pain flares and he has these available as needed.    - RT to L femur 10/2023.  RT for bilateral posterior ribs 12/2023 with Dr. Albert.  - Encouraged to continue calcium and vitamin D supplementation; continue Zometa 4mg IV every ~3 months.   - Last Zometa given 5/20/24.  Next due August 2024.  ***      4. Stage 3, UISS-high risk ccRCC: s/p R nephrectomy   - Nephrectomy 3/19/19 and noted incidentally.  He is now 4.5 years post-op without evidence for recurrence.   - At this point, there is a minimal risk of recurrence of his RCC given the time since surgery without the use of adjuvant immunotherapy. There is no suspicious adenopathy or other features on his most recent imaging studies.    - Will continue to monitor with imaging planned for prostate cancer.      5. Sensory neuropathy, Toes/feet Grade 2  - continue monitor for worsening with cabazitaxel.   - Continue acupuncture once weekly. Patient working with insurance to try to increase to twice a week.   - He is on gabapentin and duloxetine managed by palliative care   -duloxetine dose recently increased with further improvement in neuropathy. Tried weaning off  gabapentin and pain in back and ribs was uncontrolled again. Continue duloxetine and gabapentin per palliative.      6. Anemia  - likely secondary to chemotherapy. Iron levels adequate on 4/22/24. MCV elevated.  Folate and B12 adequate.  Retic appropriately elevated.      7. Left lower extremity swelling   - US 4/22/24 negative for DVT. CT abdomen pelvis 4/29 without significant or new lymphadenopathy. Improved lately with new compression garments. Continue compression stocking. Following with lymphedema therapy.    PLAN:   1. Proceed with C*** of carboplatin/cabazitaxel.  20% dose reduction of cabazi.  Consider dose reduction of carbo next if neuropathy is worse.    2. Continue acupuncture, gabapentin and duloxetine for neuropathy.     3. Return in 3 weeks for C***    4. Restaging in 3 months *** with CT CAP and NM bone scan.  Sooner if new pain or symptoms.          The longitudinal plan of care for the diagnosis(es)/condition(s) as documented were addressed during this visit. Due to the added complexity in care, I will continue to support Walter in the subsequent management and with ongoing continuity of care.      *** minutes spent on the date of the encounter doing chart review, review of test results, interpretation of tests, patient visit, and documentation       LATESHA Humphreys [facility] Oncology Followup  Aug 26, 2024    REASON FOR VISIT: Follow-up for metastatic castration-resistant prostate cancer.      INTERVAL HISTORY:     Neuropathy   Nausea/appetite  Swelling     More fatigued, slept for     Neuropathy is worse, feet and ankles are getting ahrd to move  Pain is overall getting worse, overall 2/10 in all spots - back, forehead right ribs, femur especially with increased swelling   Compression device at night helps with femur swelling and pain the next day   -overall feeling more run down, doesn't have energy to do a lot of activity, things wear him out faster than  "bfeore  Short of breath climbing stairs         ***     Increased Cymbalta which provided further relief with his neuropathy right away. He tried weaning off of gabapentin and started having more rib, back and leg pain. He has increased his gabapentin dose back up to where it was and plans to follow up with palliative care team.     He continues to ride his bike often and remains active.     He is fatigued for a few days following chemo. Last cycle, he slept the entire day on day 4, but then felt recovered that night, which is a lot sooner than with previous cycles.     Taking compazine regularly for 3-4 days which controls his nausea, has zofran if needed. Appetite is still decreased, he is eating smaller amounts and trying to eat more frequently. No bowel or bladder concerns.     Lower extremity swelling is improved. Received his custom compression garments and that has been helpful in reducing swelling, but also decreasing discomfort and increasing his strength.     He denies any new concerns today.       Review of Systems:  Remainder of 12 point ROS reviewed and otherwise negative except as in interval history.       ONCOLOGY HISTORY: Mr. 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 His oncologic history is detailed below.     1. 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 Naylor at [facility]. Prior PSA were 1.4 on 8/16/17, 2.4 on 6/28/16, 2.9 on 6/28/16, and as low as 0.4 on 3/26/2003.   - 1/08/2019: Consultation with Sarah Wilson CNP in Urology clinic. Repeat PSA 9.6.  - 1/16/2019: MRI prostate with contrast - \"This " "examination is characterized as PIRADS 5- very high probability. Clinically significant cancer is highly likely to be present. There is a large, invasive mass arising from the right peripheral zone and extending into the neurovascular bundle, seminal vesicle, and along the anterolateral right mesorectal fascia. Metastatic right external iliac lymph node. Metastatic lesion in the posterior/superior right acetabulum.\"  - 1/25/2019: CT abdomen and pelvis with IV contrast - \"1. Heterogeneous enhancing mass posteriorly in the upper pole of the right kidney measures 4.5 x 5.8 x 5.7 cm (AP by transverse by craniocaudal). It has a small nodular component extending posterior medially which abuts the right psoas muscle. This nodular extension measures 2.1 x 2.1 cm. Minimal stranding about the mass. No definite thrombus within the right renal vein. This renal mass is compatible with a renal cell carcinoma. A paraaortic lymph node situated immediately posterior to the left renal vein measures 1.1 cm in short axis, suspicious for metastasis. 2. A 2 cm right external iliac lymph node is also suspicious for metastases. 3. Multiple sclerotic osseous lesions suspicious for metastases. These include 0.8 and 0.6 cm sclerotic lesions laterally in the right iliac wing (images 53 and 62 respectively). Ill-defined groundglass density laterally in the right acetabulum measuring 1.7 x 1.2 cm corresponds with the lesion identified on MRI. A 0.4 cm groundglass density in the left acetabulum. Sclerotic lesion in the left femoral neck measures 0.9 x 1.6 cm (image 81). Sclerotic metastases would be more compatible with prostate metastases. 4. A 3 subcentimeter hepatic lesions are indeterminate. Metastases would be a consideration. These can be further characterized with liver MRI.\"  - 1/25/2019: NM bone scan - \"There is focal bony uptake in the left femoral neck, right acetabulum, right of midline at the S1 or L5 level of the spine, within " "multiple bilateral ribs, in the C7 or T1 level of the spine, and anteriorly within the skull. Findings are suspicious for metastases.\"  - 1/31/19: CT chest with contrast - \" No suspicious nodules in the chest.  Stable appearance of a 5.8 cm right renal mass concerning for renal carcinoma until proven otherwise.  Stable indeterminant subcentimeter hypodensities in the liver.  Multifocal osteoblastic metastasis including 2.5 cm lesion in the right fifth rib posteriorly and a 1.6 cm lesion in the right third rib posteriorly. No lytic lesions identified.\"  - 2/6/19: CT-guided right sclerotic fifth rib lesion biopsy - \"Metastatic carcinoma, consistent with prostate primary.  Immunohistochemical stains performed show the metastatic carcinoma stains positive for NKX3.1 (prostate marker), negative for STACIE 3 and PAX8, which supports the above diagnosis.\"  - 2/15/19: Started Casodex 50mg every day and consented for the biobanking protocol. 3/18/19 - stopped Casodex.  - 2/19/19: Case discussed in tumor board - recommendation for nephectomy for suspected malignant right renal mass.   - 2/26/19: Started Lupron 22.5mg every 3 months.   - 5/8/19: Started Docetaxel 75mg/m2 IV every 3 weeks. 06/18/19 - cycle 3, 7/10/19 - cycle 4, 07/31/19 - cycle 5, 08/21/19 - cycle 6.   - 10/2/19: Restaging CT CAP and NM Bone Scan showed improved osseous disease, no evidence of recurrent or new metastatic disease.  - 1/5/20: Restaging CT CAP and NM Bone Scan showed stable osseous disease, no evidence of recurrent or new metastatic disease.  - 4/6/20: NM bone scan with slightly improved uptake in known skeletal mets. CT C/A/P with contrast with stable osseous mets, no yusuf enlargement, no new visceral mets etc.  - 04/08/20: Zometa every 3 months.  - 10/5/20: CT C/A/P with contrast and NM bone scan - SD.   - 1/4/21: CT C/A/P with contrast and NM bone scan - SD but slight increase in right 5th rib tracer uptake and in posterior L5 sclerosis. " "1/6/21  PSA = 0.29  - 03/29/21: CT C/A/P with contrast - single new right sacral 8mm bone lesion (suspicious), other bone mets stable. No visceral/yusuf disease. Nephrectomy site without recurrence. NM bone scan - SD but \"increased uptake associated with the prior lesions of the lower cervical spine, posterior right fourth rib and right L5 posterior arch elements. Otherwise unchanged uptake associated with the proximal left femur and left anterior fourth rib. Similar uptake of the left halux, possibly secondary to degenerative osteoarthritis or gout.\"    3/31/21 PSA = 0.44  7/7/21  PSA = 0.47  11/2021 PSA = 1.05  -- Start XTANDI  12/16/21 PSA =0.22  2/11/22 PSA= 0.50  3/22/22 PSA=0.72  5/5/2022 PSA=1.79  7/11/2022 PSA=2.16 --Start cycle 1 docetaxel   8/22/22 PSA = 1.36  9/12/22 PSA = 1.09  10/24/22 Cycle #6 of Docetaxel. End of treatment  1/9/23  PSA =0.66  3/20/23  PSA=1.54  4/21/23 PSA = 1.81  T=15  5/22/23 C1 Pluvicto   06/07/23          PSA = 1.42  7/18/23 PSA=1.75, C2 Pluvicto   8/28/23 Transfer of care initial visit for Tabby.  PSA 2.06.  C3 Pluvicto scheduled for 8/30.  Proceed with dose as planned.  MR L femur, ortho referral, PSMA PET ordered for prior to follow-up.    10/2/23 Palliative RT to L femur metastasis complete.  PSMA PET with mixed response.  PSA 2.21.  RT to L femur pending.  Continue with Dose #4 of Pluvicto despite mixed response to therapy.  Dex course for possible pain flare with RT.    11/8/23  Follow-up prior to Dose #5 Pluvicto.  More pain in chest/ribs/back. PSA 4.38.  Testosterone=3.  Rad onc referral for ribs.  Biopsy progressive lesion for progression on Pluvicto.  Cabazitaxel scheduled for follow-up appointment.  Tempus blood testing unrevealing for targetable mutation.    11/27/23 Bx completed from R iliac crest but unrevealing for either PCa or RCC.  Start cabazitaxel C1D1.  PSA 2.81.    12/19/23 PSA = 3.66  1/8/24 PSA = 3.55  1/29/24 PSA = 3.07  2/19/24 PSA= 2.21-Start " "carboplatin/cabazitaxel.    3/11/24 PSA= C2 Carbo/cabazi.  PSA 1.99.    4/01/24 PSA= 1.74. C3 Carbo/cabazi.   4/22/24 PSA= 1.85. C4 Carbo/cabazi.   5/13/24 PSA = 2.06. C5 Carbo/cabazi.   6/3/24 PSA = 2.68, cycle 6 Carboplatin/cabazitaxel  6/24/24 PSA 3.51, C7 carboplatin/cabazitaxel.  Bony lesions stable on NM bone scan.  Reticulonodular densities noted in lungs as possible infection vs drug reaction.  Add chromogranin A to next labs.   7/15/24 PSA= 5.07, chromogranin A pending, cycle 8  8/5/24 PSA= 7.12, Cycle 9 carboplatin/cabazitaxel    Radiation history:   -C7         3,000 cGy       06 X      8/05/2021        8/18/2021        13       10  -2 Sacrum          2,500 cGy       18 X      4/12/2022        4/18/2022         6           -Sacrum retreat               700 cGy        18 X      2/28/2023        2/28/2023  -Left femur to 2000 cGy in 5 fractions, completed 10/10/2023   -bilateral posterior chest wall at an area of osseous involvement of the ribs and adjacent T4 and 5 spine, to be delivered to 2000 cGy in 5 fractions.  completed 12/13-12/19/23.    2. Stage III ([id number]), grade 3 of 4, clear-cell RCC of right kidney:  - Incidentally diagnosed as above.   - Underwent curative-intent robotic right radical nephrectomy with Dr. Wesley Cleveland on 3/19/19. No tumor spillage per op report.   - Path showed: \"Histologic Type: Clear cell renal cell carcinoma; Sarcomatoid Features: Not identified; Histologic Grade: Nucleolar grade 3 (WHO/ISUP). Extent Tumor Size: 4.3 cm. Microscopic Tumor Extension: Tumor extension into renal sinus (in vascular structures). Margins: Negative. Tumor Necrosis: Present; focal. Lymph-Vascular Invasion: Not identified.  Pathologic Staging (pTNM) Primary Tumor (pT): pT3a: Tumor extends into renal vein branches/renal sinus. Regional Lymph Nodes (pN): pNX. Number of Lymph Nodes Examined: 0 Distant Metastasis (pM): pM N/A.\"  - Restaging scans as above.  No current concerns for recurrence.  "     PAST MEDICAL HISTORY:  Past Medical History:   Diagnosis Date     Cancer of kidney, right (H)      Complication of anesthesia     slow wakeup      Malignant neoplasm of prostate metastatic to bone (H) 2/14/2019     Thyroid disease      CURRENT MEDICATIONS:   Current Outpatient Medications:      atorvastatin (LIPITOR) 80 MG tablet, Take 80 mg by mouth daily, Disp: , Rfl:      BERBERINE CHLORIDE PO, , Disp: , Rfl:      calcium citrate-vitamin D (CITRACAL) 315-250 MG-UNIT TABS per tablet, Take 500 mg by mouth 2 times daily With 800 Vd per pt, Disp: , Rfl:      cycloSPORINE (RESTASIS) 0.05 % ophthalmic emulsion, Place 1 drop into both eyes 2 times daily , Disp: , Rfl:      dexAMETHasone (DECADRON) 4 MG tablet, Take 2 tablets (8 mg) by mouth daily Take for 3 days, starting the day after chemo. Take with food., Disp: 6 tablet, Rfl: 2     dexAMETHasone (DECADRON) 4 MG tablet, Take 1 tablet (4 mg) by mouth daily, Disp: 7 tablet, Rfl: 2     DULoxetine (CYMBALTA) 30 MG capsule, Take 2 capsules (60 mg) by mouth daily, Disp: 60 capsule, Rfl: 0     fentaNYL (DURAGESIC) 25 mcg/hr 72 hr patch, Place 1 patch onto the skin every 72 hours remove old patch., Disp: 10 patch, Rfl: 0     furosemide (LASIX) 20 MG tablet, Take 1 tablet (20 mg) by mouth daily, Disp: 15 tablet, Rfl: 1     gabapentin (NEURONTIN) 300 MG capsule, Take 1 capsule (300 mg) by mouth every morning AND 2 capsules (600 mg) daily at 2 pm AND 2 capsules (600 mg) at bedtime., Disp: 150 capsule, Rfl: 3     levothyroxine (SYNTHROID/LEVOTHROID) 112 MCG tablet, Take 112 mcg by mouth daily, Disp: , Rfl:      lidocaine, viscous, (XYLOCAINE) 2 % solution, Swish and swallow 15 mLs in mouth every 3 hours as needed for pain (before meals and at bedtime) ; Max 8 doses/24 hour period., Disp: 100 mL, Rfl: 1     loratadine (CLARITIN) 10 MG tablet, Take 10 mg by mouth daily, Disp: , Rfl:      Multiple Vitamins-Minerals (MULTIVITAMIN ADULT EXTRA C PO), Take 1 tablet by mouth every  24 hours, Disp: , Rfl:      naloxone (NARCAN) 4 MG/0.1ML nasal spray, Spray 1 spray (4 mg) into one nostril alternating nostrils as needed for opioid reversal every 2-3 minutes until assistance arrives, Disp: 0.2 mL, Rfl: 1     Nutritional Supplements (SALMON OIL) CAPS, Take 2 capsules by mouth daily , Disp: , Rfl:      omeprazole (PRILOSEC) 20 MG DR capsule, Take 20 mg by mouth daily, Disp: , Rfl:      ondansetron (ZOFRAN) 8 MG tablet, Take 1 tablet (8 mg) by mouth every 8 hours as needed for nausea (vomiting), Disp: 30 tablet, Rfl: 2     ondansetron (ZOFRAN) 8 MG tablet, Take 1 tablet (8 mg) by mouth every 8 hours as needed for nausea, Disp: 30 tablet, Rfl: 4     oxyCODONE (ROXICODONE) 5 MG tablet, Take 1-2 tablets (5-10 mg) by mouth every 3 hours as needed for severe pain, Disp: 90 tablet, Rfl: 0     predniSONE (DELTASONE) 5 MG tablet, Take 1 tablet (5 mg) by mouth 2 times daily (with meals) for 21 days, Disp: 42 tablet, Rfl: 0     prochlorperazine (COMPAZINE) 10 MG tablet, Take 1 tablet (10 mg) by mouth every 6 hours as needed for nausea or vomiting, Disp: 30 tablet, Rfl: 5     tamsulosin (FLOMAX) 0.4 MG capsule, Take 1 capsule (0.4 mg) by mouth daily, Disp: 30 capsule, Rfl: 3     triamterene-HCTZ (MAXZIDE-25) 37.5-25 MG tablet, Take 1 tablet by mouth daily, Disp: , Rfl:       Physical Exam:   There were no vitals taken for this visit.  Wt Readings from Last 10 Encounters:   08/05/24 112 kg (247 lb)   07/24/24 113.4 kg (250 lb)   07/15/24 113.9 kg (251 lb)   07/03/24 114.8 kg (253 lb)   06/24/24 114.7 kg (252 lb 14.4 oz)   06/03/24 116 kg (255 lb 12.8 oz)   05/13/24 117.9 kg (259 lb 14.4 oz)   04/22/24 118.3 kg (260 lb 14.4 oz)   04/01/24 119.4 kg (263 lb 3.2 oz)   03/28/24 116.1 kg (256 lb)   General: The patient is a pleasant male in no acute distress.  HEENT: EOMI. Sclerae are anicteric. Mucous membranes moist without lesions or thrush.   Lymph: Neck is supple with no lymphadenopathy in the cervical or  supraclavicular areas.   Heart: Regular rate and rhythm.   Lungs: normal work of breathing on RA, clear to auscultation throughout   Abdomen: Bowel sounds present, soft, non-distended.    Extremities:trace bilateral lower extremity edema   Neuro: Cranial nerves II through XII are grossly intact.  Skin: No rashes, petechiae, or bruising noted on exposed skin.  Psych: affect bright, alert and oriented    LABORATORY DATA:  Most Recent 3 CBC's:  Recent Labs   Lab Test 08/05/24  0825 07/15/24  0628 06/24/24  0754   WBC 4.6 3.7* 3.9*   HGB 10.4* 9.3* 10.0*   * 107* 106*   * 147* 157   ANEUTAUTO 3.3 2.6 2.9     Most Recent 3 BMP's:  Recent Labs   Lab Test 08/05/24  0825 07/15/24  0628 06/24/24  0754    141 141   POTASSIUM 3.6 3.9 4.5   CHLORIDE 107 107 108*   CO2 23 25 23   BUN 14.0 11.1 16.0   CR 0.92 0.95 1.01   ANIONGAP 12 9 10   BETHANY 8.9 8.9 8.7*   * 105* 108*   PROTTOTAL 6.5 6.2* 6.3*   ALBUMIN 4.1 4.0 4.0    Most Recent 3 LFT's:  Recent Labs   Lab Test 08/05/24  0825 07/15/24  0628 06/24/24  0754   AST 20 23 21   ALT 19 18 16   ALKPHOS 94 73 80   BILITOTAL 0.4 0.3 0.3       PSA Tumor Marker   Date Value Ref Range Status   08/05/2024 7.12 (H) 0.00 - 4.50 ng/mL Final   07/15/2024 5.07 (H) 0.00 - 4.50 ng/mL Final   06/24/2024 3.51 0.00 - 4.50 ng/mL Final   06/03/2024 2.68 0.00 - 4.50 ng/mL Final   05/13/2024 2.06 0.00 - 4.50 ng/mL Final   08/22/2022 1.36 0.00 - 4.00 ug/L Final   08/01/2022 1.78 0.00 - 4.00 ug/L Final   07/11/2022 2.16 0.00 - 4.00 ug/L Final   05/05/2022 1.79 0.00 - 4.00 ug/L Final   03/22/2022 0.72 0.00 - 4.00 ug/L Final         ASSESSMENT & PLAN: Mr. Reyes is a 64 year old gentleman with metastatic castration sensitive prostate adenocarcinoma as well as an incidentally diagnosed stage III clear-cell renal cell carcinoma of the right kidney (s/p radical R nephrectomy 3/19/19).     1. Metastatic castration-resistant prostate cancer, with involvement of the bones and  "RPLN:   - Patient met the criteria for CHAARTED \"high-volume\" metastatic hormone-sensitive prostate cancer. He opted for docetaxel in combination with ADT (per JOSEFA, [id number], KARLA). He was subsequently treated with docetaxel x6 doses in the [facility] setting and enzalutamide.  He initiated Pluvicto in May 2023 with an initial slight decline in PSA, but this began to rise just prior to Cycle 3.  Given his XR evidence of progression in L femur and PSA rise, we re-evaluated his progression with PSMA PET scan in September 2023, with note of mixed response.  Canceled Pluvicto Dose for November due to symptomatic progression with PSA rise. PSA is labile. Biopsy results from 11/21 was unrevealing for evaluation of NEPC vs small cell given progression without a significant rise in PSA. Tempus peripheral blood mutation analysis is unrevealing for targetable mutations. Cabazitaxel started 11/27/23 given progression on Pluvicto. Carboplatin added 2/2024 and cabazitaxel dose reduced by 20% in April 2024 due to neuropathy.  Restaging with pulmonary consolidative findings without mass-like features.  Chromogranin A 271 on 7/15/24.   -Feeling well, no new concerns today. Of note his PSA with slight increase again today, up to 7.12 ng/mL. Discussed with Dr. Mcnair, patient is clinically stable, will continue current regimen for now.   -Lupron every 3 months, next due 10/7/2024  -Cabazitaxel to be continued at 20% dose reduction.     2. Pressure in chest, resolved:  Has an ongoing sensation of pressure/need to cough in his chest, primarily against his sternum. Vital signs stable, negative pulmonary physical exam. No chest pain.     3. Bone metastases:    - Noted to have osseous metastatic disease at the time of diagnosis. S/p radiation to C spine and sacrum (Re-irradiation to sacrum).   - pain mgmt per palliative care, pain is now well controlled with fentanyl patch. Dexamethasone burst is helpful for pain flares and he has " these available as needed.    - RT to L femur 10/2023.  RT for bilateral posterior ribs 12/2023 with Dr. Albert.  - Encouraged to continue calcium and vitamin D supplementation; continue Zometa 4mg IV every ~3 months.   - Last Zometa given 5/20/24.  Next due August 2024.  ***      4. Stage 3, UISS-high risk ccRCC: s/p R nephrectomy   - Nephrectomy 3/19/19 and noted incidentally.  He is now 4.5 years post-op without evidence for recurrence.   - At this point, there is a minimal risk of recurrence of his RCC given the time since surgery without the use of adjuvant immunotherapy. There is no suspicious adenopathy or other features on his most recent imaging studies.    - Will continue to monitor with imaging planned for prostate cancer.      5. Sensory neuropathy, Toes/feet Grade 2  - continue monitor for worsening with cabazitaxel.   - Continue acupuncture once weekly. Patient working with insurance to try to increase to twice a week.   - He is on gabapentin and duloxetine managed by palliative care   -duloxetine dose recently increased with further improvement in neuropathy. Tried weaning off gabapentin and pain in back and ribs was uncontrolled again. Continue duloxetine and gabapentin per palliative.      6. Anemia  - likely secondary to chemotherapy. Iron levels adequate on 4/22/24. MCV elevated.  Folate and B12 adequate.  Retic appropriately elevated.      7. Left lower extremity swelling   - US 4/22/24 negative for DVT. CT abdomen pelvis 4/29 without significant or new lymphadenopathy. Improved lately with new compression garments. Continue compression stocking. Following with lymphedema therapy.    PLAN:   1. Proceed with C*** of carboplatin/cabazitaxel.  20% dose reduction of cabazi.  Consider dose reduction of carbo next if neuropathy is worse.    2. Continue acupuncture, gabapentin and duloxetine for neuropathy.     3. Return in 3 weeks for C***    4. Restaging in 3 months *** with CT CAP and NM bone scan.   Sooner if new pain or symptoms.      *** increase prednisone if needed       The longitudinal plan of care for the diagnosis(es)/condition(s) as documented were addressed during this visit. Due to the added complexity in care, I will continue to support Walter in the subsequent management and with ongoing continuity of care.      *** minutes spent on the date of the encounter doing chart review, review of test results, interpretation of tests, patient visit, and documentation       LATESHA Humphreys CNP            Again, thank you for allowing me to participate in the care of your patient.        Sincerely,        LATESHA Humphreys CNP

## 2024-08-26 NOTE — PATIENT INSTRUCTIONS
[facility] Triage and after hours / weekends / holidays:  [phone] option 5, option 2    Please call the triage or after hours line if you experience a temperature greater than or equal to 100.4, shaking chills, have uncontrolled nausea, vomiting and/or diarrhea, dizziness, shortness of breath, chest pain, bleeding, unexplained bruising, or if you have any other new/concerning symptoms, questions or concerns.      If you are having any concerning symptoms or wish to speak to a provider before your next infusion visit, please call triage to notify your care team so we can adequately serve you.     If you need a refill on a narcotic prescription or other medication, please call before your infusion appointment.

## 2024-08-26 NOTE — PROGRESS NOTES
Infusion Nursing Note:  Walter Reyes presents today for C10D1 Jevtana/Carboplatin/Neulasta OnPro/Zometa.    Patient seen by provider today: Yes: Sherry Lee CNP prior to infusion   present during visit today: Not Applicable.    Note: Denied any questions or concerns following provider visit.    Denied any jaw pain or changes. No upcoming dental procedures.     TORB Sherry Lee CNP/Yi Amezcua RN 08/26/24 @ 7756  - discussed with Dr. Mcnair  - if patient is feeling fatigued and run down next week and when traveling, he can increase his prednisone to 10 mg from 5 mg  - new prescription sent to pharmacy    Patient verbalized understanding.    Intravenous Access:  Implanted Port.    Treatment Conditions:   Latest Reference Range & Units 08/26/24 11:37   Sodium 135 - 145 mmol/L 141   Potassium 3.4 - 5.3 mmol/L 3.7   Chloride 98 - 107 mmol/L 105   Carbon Dioxide (CO2) 22 - 29 mmol/L 23   Urea Nitrogen 8.0 - 23.0 mg/dL 15.2   Creatinine 0.67 - 1.17 mg/dL 0.91   GFR Estimate >60 mL/min/1.73m2 >90   Calcium 8.8 - 10.4 mg/dL 8.9   Anion Gap 7 - 15 mmol/L 13   Albumin 3.5 - 5.2 g/dL 4.0   Protein Total 6.4 - 8.3 g/dL 6.5   Alkaline Phosphatase 40 - 150 U/L 112   ALT 0 - 70 U/L 20   AST 0 - 45 U/L 23   Bilirubin Total <=1.2 mg/dL 0.3   Glucose 70 - 99 mg/dL 123 (H)   PSA Tumor Marker 0.00 - 4.50 ng/mL 9.88 (H)   WBC 4.0 - 11.0 10e3/uL 4.8   Hemoglobin 13.3 - 17.7 g/dL 10.1 (L)   Hematocrit 40.0 - 53.0 % 31.4 (L)   Platelet Count 150 - 450 10e3/uL 124 (L)   RBC Count 4.40 - 5.90 10e6/uL 2.94 (L)   MCV 78 - 100 fL 107 (H)   MCH 26.5 - 33.0 pg 34.4 (H)   MCHC 31.5 - 36.5 g/dL 32.2   RDW 10.0 - 15.0 % 16.5 (H)   % Neutrophils % 71   % Lymphocytes % 17   % Monocytes % 12   % Eosinophils % 0   % Basophils % 0   Absolute Basophils 0.0 - 0.2 10e3/uL 0.0   Absolute Eosinophils 0.0 - 0.7 10e3/uL 0.0   Absolute Immature Granulocytes <=0.4 10e3/uL 0.0   Absolute Lymphocytes 0.8 - 5.3 10e3/uL 0.8   Absolute  Monocytes 0.0 - 1.3 10e3/uL 0.6   % Immature Granulocytes % 0   Absolute Neutrophils 1.6 - 8.3 10e3/uL 3.4   Absolute NRBCs 10e3/uL 0.0   NRBCs per 100 WBC <1 /100 0     Results reviewed, labs MET treatment parameters, ok to proceed with treatment.      Post Infusion Assessment:  Patient tolerated infusion without incident.  Blood return noted pre and post infusion.  Site patent and intact, free from redness, edema or discomfort.  No evidence of extravasations.  Access discontinued per protocol.     Neulasta Onpro On-Body injector applied to left abdomen at 1530.  Writer discussed Neulasta injection would start tomorrow 8/27 at 1830, approximately 27 hours after application applied today.    Written and Verbal instruction reviewed with patient.  Pt instructed when the dose delivery starts, it will take about 45 minutes to complete.  Pt aware Neulasta Onpro On-Body should have green flashing light and to call triage or on-call MD if injector flashes red or appears to be leaking.   Pt aware to keep Onpro On-Body Neulasta 4 inches away from electrical equipment and to avoid showering 4 hours prior to injection.   Neulasta Onpro Lot number: see eMAR    Discharge Plan:   Prescription refills given for prednisone.  Discharge instructions reviewed with: Patient and Family.  Patient and/or family verbalized understanding of discharge instructions and all questions answered.  AVS to patient via [employer].  Patient will return 9/16 for next appointment.   Patient discharged in stable condition accompanied by: self and wife.  Departure Mode: Ambulatory.      Marti Amezcua RN

## 2024-08-26 NOTE — PROGRESS NOTES
"    [facility] Oncology Followup  Aug 26, 2024    REASON FOR VISIT: Follow-up for metastatic castration-resistant prostate cancer.      INTERVAL HISTORY:       -neuropathy is worse in feet up to ankles   -pain is increased, overall 2/10 in all spots - back, forehead right ribs, femur especially with increased swelling   -compression device at night helps with femur swelling and pain the next day   -overall feeling more run down, doesn't have energy to do a lot of activity, things wear him out faster than before  -has shortness of breath with climbing stairs, none at rest. No chest pain  -appetite is good, denies GI concerns         Review of Systems:  Remainder of 12 point ROS reviewed and otherwise negative except as in interval history.       ONCOLOGY HISTORY: Mr. 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 His oncologic history is detailed below.     1. 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 Naylor at [facility]. Prior PSA were 1.4 on 8/16/17, 2.4 on 6/28/16, 2.9 on 6/28/16, and as low as 0.4 on 3/26/2003.   - 1/08/2019: Consultation with Sarah Wilson CNP in Urology clinic. Repeat PSA 9.6.  - 1/16/2019: MRI prostate with contrast - \"This examination is characterized as PIRADS 5- very high probability. Clinically significant cancer is highly likely to be present. There is a large, invasive mass arising from the right peripheral zone and extending into the neurovascular bundle, seminal vesicle, and along the anterolateral right mesorectal fascia. Metastatic right external iliac lymph node. Metastatic lesion in the " "posterior/superior right acetabulum.\"  - 1/25/2019: CT abdomen and pelvis with IV contrast - \"1. Heterogeneous enhancing mass posteriorly in the upper pole of the right kidney measures 4.5 x 5.8 x 5.7 cm (AP by transverse by craniocaudal). It has a small nodular component extending posterior medially which abuts the right psoas muscle. This nodular extension measures 2.1 x 2.1 cm. Minimal stranding about the mass. No definite thrombus within the right renal vein. This renal mass is compatible with a renal cell carcinoma. A paraaortic lymph node situated immediately posterior to the left renal vein measures 1.1 cm in short axis, suspicious for metastasis. 2. A 2 cm right external iliac lymph node is also suspicious for metastases. 3. Multiple sclerotic osseous lesions suspicious for metastases. These include 0.8 and 0.6 cm sclerotic lesions laterally in the right iliac wing (images 53 and 62 respectively). Ill-defined groundglass density laterally in the right acetabulum measuring 1.7 x 1.2 cm corresponds with the lesion identified on MRI. A 0.4 cm groundglass density in the left acetabulum. Sclerotic lesion in the left femoral neck measures 0.9 x 1.6 cm (image 81). Sclerotic metastases would be more compatible with prostate metastases. 4. A 3 subcentimeter hepatic lesions are indeterminate. Metastases would be a consideration. These can be further characterized with liver MRI.\"  - 1/25/2019: NM bone scan - \"There is focal bony uptake in the left femoral neck, right acetabulum, right of midline at the S1 or L5 level of the spine, within multiple bilateral ribs, in the C7 or T1 level of the spine, and anteriorly within the skull. Findings are suspicious for metastases.\"  - 1/31/19: CT chest with contrast - \" No suspicious nodules in the chest.  Stable appearance of a 5.8 cm right renal mass concerning for renal carcinoma until proven otherwise.  Stable indeterminant subcentimeter hypodensities in the liver.  " "Multifocal osteoblastic metastasis including 2.5 cm lesion in the right fifth rib posteriorly and a 1.6 cm lesion in the right third rib posteriorly. No lytic lesions identified.\"  - 2/6/19: CT-guided right sclerotic fifth rib lesion biopsy - \"Metastatic carcinoma, consistent with prostate primary.  Immunohistochemical stains performed show the metastatic carcinoma stains positive for NKX3.1 (prostate marker), negative for STACIE 3 and PAX8, which supports the above diagnosis.\"  - 2/15/19: Started Casodex 50mg every day and consented for the biobanking protocol. 3/18/19 - stopped Casodex.  - 2/19/19: Case discussed in tumor board - recommendation for nephectomy for suspected malignant right renal mass.   - 2/26/19: Started Lupron 22.5mg every 3 months.   - 5/8/19: Started Docetaxel 75mg/m2 IV every 3 weeks. 06/18/19 - cycle 3, 7/10/19 - cycle 4, 07/31/19 - cycle 5, 08/21/19 - cycle 6.   - 10/2/19: Restaging CT CAP and NM Bone Scan showed improved osseous disease, no evidence of recurrent or new metastatic disease.  - 1/5/20: Restaging CT CAP and NM Bone Scan showed stable osseous disease, no evidence of recurrent or new metastatic disease.  - 4/6/20: NM bone scan with slightly improved uptake in known skeletal mets. CT C/A/P with contrast with stable osseous mets, no yusuf enlargement, no new visceral mets etc.  - 04/08/20: Zometa every 3 months.  - 10/5/20: CT C/A/P with contrast and NM bone scan - SD.   - 1/4/21: CT C/A/P with contrast and NM bone scan - SD but slight increase in right 5th rib tracer uptake and in posterior L5 sclerosis. 1/6/21  PSA = 0.29  - 03/29/21: CT C/A/P with contrast - single new right sacral 8mm bone lesion (suspicious), other bone mets stable. No visceral/yusuf disease. Nephrectomy site without recurrence. NM bone scan - SD but \"increased uptake associated with the prior lesions of the lower cervical spine, posterior right fourth rib and right L5 posterior arch elements. Otherwise " "unchanged uptake associated with the proximal left femur and left anterior fourth rib. Similar uptake of the left halux, possibly secondary to degenerative osteoarthritis or gout.\"    3/31/21 PSA = 0.44  7/7/21  PSA = 0.47  11/2021 PSA = 1.05  -- Start XTANDI  12/16/21 PSA =0.22  2/11/22 PSA= 0.50  3/22/22 PSA=0.72  5/5/2022 PSA=1.79  7/11/2022 PSA=2.16 --Start cycle 1 docetaxel   8/22/22 PSA = 1.36  9/12/22 PSA = 1.09  10/24/22 Cycle #6 of Docetaxel. End of treatment  1/9/23  PSA =0.66  3/20/23  PSA=1.54  4/21/23 PSA = 1.81  T=15  5/22/23 C1 Pluvicto   06/07/23          PSA = 1.42  7/18/23 PSA=1.75, C2 Pluvicto   8/28/23 Transfer of care initial visit for Tabby.  PSA 2.06.  C3 Pluvicto scheduled for 8/30.  Proceed with dose as planned.  MR L femur, ortho referral, PSMA PET ordered for prior to follow-up.    10/2/23 Palliative RT to L femur metastasis complete.  PSMA PET with mixed response.  PSA 2.21.  RT to L femur pending.  Continue with Dose #4 of Pluvicto despite mixed response to therapy.  Dex course for possible pain flare with RT.    11/8/23  Follow-up prior to Dose #5 Pluvicto.  More pain in chest/ribs/back. PSA 4.38.  Testosterone=3.  Rad onc referral for ribs.  Biopsy progressive lesion for progression on Pluvicto.  Cabazitaxel scheduled for follow-up appointment.  Tempus blood testing unrevealing for targetable mutation.    11/27/23 Bx completed from R iliac crest but unrevealing for either PCa or RCC.  Start cabazitaxel C1D1.  PSA 2.81.    12/19/23 PSA = 3.66  1/8/24 PSA = 3.55  1/29/24 PSA = 3.07  2/19/24 PSA= 2.21-Start carboplatin/cabazitaxel.    3/11/24 PSA= C2 Carbo/cabazi.  PSA 1.99.    4/01/24 PSA= 1.74. C3 Carbo/cabazi.   4/22/24 PSA= 1.85. C4 Carbo/cabazi.   5/13/24 PSA = 2.06. C5 Carbo/cabazi.   6/3/24 PSA = 2.68, cycle 6 Carboplatin/cabazitaxel  6/24/24 PSA 3.51, C7 carboplatin/cabazitaxel.  Bony lesions stable on NM bone scan.  Reticulonodular densities noted in lungs as possible infection " "vs drug reaction.  Add chromogranin A to next labs.   7/15/24 PSA= 5.07, chromogranin A pending, cycle 8  8/5/24 PSA= 7.12, Cycle 9 carboplatin/cabazitaxel  8/26/24 PSA= 9.88, cycle 10 carbo/cabazitaxel    Radiation history:   -C7         3,000 cGy       06 X      8/05/2021        8/18/2021        13       10  -2 Sacrum          2,500 cGy       18 X      4/12/2022        4/18/2022         6           -Sacrum retreat               700 cGy        18 X      2/28/2023        2/28/2023  -Left femur to 2000 cGy in 5 fractions, completed 10/10/2023   -bilateral posterior chest wall at an area of osseous involvement of the ribs and adjacent T4 and 5 spine, to be delivered to 2000 cGy in 5 fractions.  completed 12/13-12/19/23.    2. Stage III ([id number]), grade 3 of 4, clear-cell RCC of right kidney:  - Incidentally diagnosed as above.   - Underwent curative-intent robotic right radical nephrectomy with Dr. Wesley Cleveland on 3/19/19. No tumor spillage per op report.   - Path showed: \"Histologic Type: Clear cell renal cell carcinoma; Sarcomatoid Features: Not identified; Histologic Grade: Nucleolar grade 3 (WHO/ISUP). Extent Tumor Size: 4.3 cm. Microscopic Tumor Extension: Tumor extension into renal sinus (in vascular structures). Margins: Negative. Tumor Necrosis: Present; focal. Lymph-Vascular Invasion: Not identified.  Pathologic Staging (pTNM) Primary Tumor (pT): pT3a: Tumor extends into renal vein branches/renal sinus. Regional Lymph Nodes (pN): pNX. Number of Lymph Nodes Examined: 0 Distant Metastasis (pM): pM N/A.\"  - Restaging scans as above.  No current concerns for recurrence.      PAST MEDICAL HISTORY:  Past Medical History:   Diagnosis Date    Cancer of kidney, right (H)     Complication of anesthesia     slow wakeup     Malignant neoplasm of prostate metastatic to bone (H) 2/14/2019    Thyroid disease      CURRENT MEDICATIONS:   Current Outpatient Medications:     atorvastatin (LIPITOR) 80 MG tablet, Take 80 mg by " mouth daily, Disp: , Rfl:     BERBERINE CHLORIDE PO, , Disp: , Rfl:     calcium citrate-vitamin D (CITRACAL) 315-250 MG-UNIT TABS per tablet, Take 500 mg by mouth 2 times daily With 800 Vd per pt, Disp: , Rfl:     cycloSPORINE (RESTASIS) 0.05 % ophthalmic emulsion, Place 1 drop into both eyes 2 times daily , Disp: , Rfl:     dexAMETHasone (DECADRON) 4 MG tablet, Take 2 tablets (8 mg) by mouth daily Take for 3 days, starting the day after chemo. Take with food., Disp: 6 tablet, Rfl: 2    dexAMETHasone (DECADRON) 4 MG tablet, Take 1 tablet (4 mg) by mouth daily, Disp: 7 tablet, Rfl: 2    DULoxetine (CYMBALTA) 60 MG capsule, Take 1 capsule (60 mg) by mouth daily., Disp: 90 capsule, Rfl: 1    furosemide (LASIX) 20 MG tablet, Take 1 tablet (20 mg) by mouth daily, Disp: 15 tablet, Rfl: 1    gabapentin (NEURONTIN) 300 MG capsule, Take 1 capsule (300 mg) by mouth every morning AND 2 capsules (600 mg) daily at 2 pm AND 2 capsules (600 mg) at bedtime., Disp: 150 capsule, Rfl: 3    levothyroxine (SYNTHROID/LEVOTHROID) 112 MCG tablet, Take 112 mcg by mouth daily, Disp: , Rfl:     lidocaine, viscous, (XYLOCAINE) 2 % solution, Swish and swallow 15 mLs in mouth every 3 hours as needed for pain (before meals and at bedtime) ; Max 8 doses/24 hour period., Disp: 100 mL, Rfl: 1    loratadine (CLARITIN) 10 MG tablet, Take 10 mg by mouth daily, Disp: , Rfl:     Multiple Vitamins-Minerals (MULTIVITAMIN ADULT EXTRA C PO), Take 1 tablet by mouth every 24 hours, Disp: , Rfl:     naloxone (NARCAN) 4 MG/0.1ML nasal spray, Spray 1 spray (4 mg) into one nostril alternating nostrils as needed for opioid reversal every 2-3 minutes until assistance arrives, Disp: 0.2 mL, Rfl: 1    Nutritional Supplements (SALMON OIL) CAPS, Take 2 capsules by mouth daily , Disp: , Rfl:     omeprazole (PRILOSEC) 20 MG DR capsule, Take 20 mg by mouth daily, Disp: , Rfl:     ondansetron (ZOFRAN) 8 MG tablet, Take 1 tablet (8 mg) by mouth every 8 hours as needed for  nausea (vomiting), Disp: 30 tablet, Rfl: 2    ondansetron (ZOFRAN) 8 MG tablet, Take 1 tablet (8 mg) by mouth every 8 hours as needed for nausea, Disp: 30 tablet, Rfl: 4    oxyCODONE (ROXICODONE) 5 MG tablet, Take 1-2 tablets (5-10 mg) by mouth every 3 hours as needed for severe pain, Disp: 90 tablet, Rfl: 0    prochlorperazine (COMPAZINE) 10 MG tablet, Take 1 tablet (10 mg) by mouth every 6 hours as needed for nausea or vomiting, Disp: 30 tablet, Rfl: 5    tamsulosin (FLOMAX) 0.4 MG capsule, Take 1 capsule (0.4 mg) by mouth daily, Disp: 30 capsule, Rfl: 3    triamterene-HCTZ (MAXZIDE-25) 37.5-25 MG tablet, Take 1 tablet by mouth daily, Disp: , Rfl:     fentaNYL (DURAGESIC) 25 mcg/hr 72 hr patch, Place 1 patch onto the skin every 72 hours. remove old patch., Disp: 10 patch, Rfl: 0    predniSONE (DELTASONE) 5 MG tablet, Take 1-2 tablets (5-10 mg) by mouth 2 times daily (with meals) for 21 days., Disp: 84 tablet, Rfl: 0      Physical Exam:   /76 (BP Location: Right arm, Patient Position: Sitting, Cuff Size: Adult Large)   Pulse 73   Temp 98.9  F (37.2  C) (Oral)   Resp 16   Wt 113.1 kg (249 lb 6.4 oz)   SpO2 96%   BMI 33.82 kg/m    Wt Readings from Last 10 Encounters:   08/26/24 113.1 kg (249 lb 6.4 oz)   08/05/24 112 kg (247 lb)   07/24/24 113.4 kg (250 lb)   07/15/24 113.9 kg (251 lb)   07/03/24 114.8 kg (253 lb)   06/24/24 114.7 kg (252 lb 14.4 oz)   06/03/24 116 kg (255 lb 12.8 oz)   05/13/24 117.9 kg (259 lb 14.4 oz)   04/22/24 118.3 kg (260 lb 14.4 oz)   04/01/24 119.4 kg (263 lb 3.2 oz)   General: The patient is a pleasant male in no acute distress.  HEENT: EOMI. Sclerae are anicteric. Mucous membranes moist without lesions or thrush.   Lymph: Neck is supple with no lymphadenopathy in the cervical or supraclavicular areas.   Heart: Regular rate and rhythm.   Lungs: normal work of breathing on RA, clear to auscultation throughout   Abdomen: Bowel sounds present, soft, non-distended.    Extremities:  "trace bilateral lower extremity edema, nonpitting edema from thigh through lower leg in left leg    Neuro: Cranial nerves II through XII are grossly intact.  Skin: No rashes, petechiae, or bruising noted on exposed skin.  Psych: affect bright, alert and oriented    LABORATORY DATA:  Most Recent 3 CBC's:  Recent Labs   Lab Test 08/26/24  1137 08/05/24  0825 07/15/24  0628   WBC 4.8 4.6 3.7*   HGB 10.1* 10.4* 9.3*   * 107* 107*   * 134* 147*   ANEUTAUTO 3.4 3.3 2.6     Most Recent 3 BMP's:  Recent Labs   Lab Test 08/26/24  1137 08/05/24  0825 07/15/24  0628    142 141   POTASSIUM 3.7 3.6 3.9   CHLORIDE 105 107 107   CO2 23 23 25   BUN 15.2 14.0 11.1   CR 0.91 0.92 0.95   ANIONGAP 13 12 9   BETHANY 8.9 8.9 8.9   * 158* 105*   PROTTOTAL 6.5 6.5 6.2*   ALBUMIN 4.0 4.1 4.0    Most Recent 3 LFT's:  Recent Labs   Lab Test 08/26/24  1137 08/05/24  0825 07/15/24  0628   AST 23 20 23   ALT 20 19 18   ALKPHOS 112 94 73   BILITOTAL 0.3 0.4 0.3       PSA Tumor Marker   Date Value Ref Range Status   08/26/2024 9.88 (H) 0.00 - 4.50 ng/mL Final   08/05/2024 7.12 (H) 0.00 - 4.50 ng/mL Final   07/15/2024 5.07 (H) 0.00 - 4.50 ng/mL Final   06/24/2024 3.51 0.00 - 4.50 ng/mL Final   06/03/2024 2.68 0.00 - 4.50 ng/mL Final   08/22/2022 1.36 0.00 - 4.00 ug/L Final   08/01/2022 1.78 0.00 - 4.00 ug/L Final   07/11/2022 2.16 0.00 - 4.00 ug/L Final   05/05/2022 1.79 0.00 - 4.00 ug/L Final   03/22/2022 0.72 0.00 - 4.00 ug/L Final           ASSESSMENT & PLAN: Mr. Reyes is a 64 year old gentleman with metastatic castration sensitive prostate adenocarcinoma as well as an incidentally diagnosed stage III clear-cell renal cell carcinoma of the right kidney (s/p radical R nephrectomy 3/19/19).     1. Metastatic castration-resistant prostate cancer, with involvement of the bones and RPLN:   - Patient met the criteria for CHAARTED \"high-volume\" metastatic hormone-sensitive prostate cancer. He opted for docetaxel in " combination with ADT (per JOSEFA, [id number], KARLA). He was subsequently treated with docetaxel x6 doses in the [facility] setting and enzalutamide.  He initiated Pluvicto in May 2023 with an initial slight decline in PSA, but this began to rise just prior to Cycle 3.  Given his XR evidence of progression in L femur and PSA rise, we re-evaluated his progression with PSMA PET scan in September 2023, with note of mixed response.  Canceled Pluvicto Dose for November due to symptomatic progression with PSA rise. PSA is labile. Biopsy results from 11/21 was unrevealing for evaluation of NEPC vs small cell given progression without a significant rise in PSA. Tempus peripheral blood mutation analysis is unrevealing for targetable mutations. Cabazitaxel started 11/27/23 given progression on Pluvicto. Carboplatin added 2/2024 and cabazitaxel dose reduced by 20% in April 2024 due to neuropathy.  Restaging with pulmonary consolidative findings without mass-like features.  Chromogranin A 271 on 7/15/24.   -Feeling more fatigued with more neuropathy. Discussed dose reduction but he would like to continue at current dose. Of note his PSA with slight increase again today, up to 7.12 ng/mL. Discussed with Dr. Mcnair, patient is clinically stable, will continue current regimen for now.   -Lupron every 3 months, next due 10/7/2024  -Cabazitaxel to be continued at 20% dose reduction.     2. Pressure in chest, resolved:  Has an ongoing sensation of pressure/need to cough in his chest, primarily against his sternum. Vital signs stable, negative pulmonary physical exam. No chest pain.     3. Bone metastases:    - Noted to have osseous metastatic disease at the time of diagnosis. S/p radiation to C spine and sacrum (Re-irradiation to sacrum).   - pain mgmt per palliative care, pain is now well controlled with fentanyl patch. Dexamethasone burst is helpful for pain flares and he has these available as needed.    -Will increase Prednisone  to 5-10mg daily (can increase to 10mg dose as needed for pain)  - RT to L femur 10/2023.  RT for bilateral posterior ribs 12/2023 with Dr. Albert.  - Encouraged to continue calcium and vitamin D supplementation; continue Zometa 4mg IV every ~3 months.   - Last Zometa given 5/20/24. Will give next dose today.      4. Stage 3, UISS-high risk ccRCC: s/p R nephrectomy   - Nephrectomy 3/19/19 and noted incidentally.  He is now 4.5 years post-op without evidence for recurrence.   - At this point, there is a minimal risk of recurrence of his RCC given the time since surgery without the use of adjuvant immunotherapy. There is no suspicious adenopathy or other features on his most recent imaging studies.    - Will continue to monitor with imaging planned for prostate cancer.      5. Sensory neuropathy, Toes/feet Grade 2  - continue monitor for worsening with cabazitaxel. Increased over the last few weeks, discussed dose reduction but Walter does not think it is severe enough to warrant a reduction.   - Continue acupuncture once weekly. Patient working with insurance to try to increase to twice a week.   - He is on gabapentin and duloxetine managed by palliative care   -duloxetine dose recently increased with further improvement in neuropathy. Tried weaning off gabapentin and pain in back and ribs was uncontrolled again. Continue duloxetine and gabapentin per palliative.      6. Anemia  - likely secondary to chemotherapy. Iron levels adequate on 4/22/24. MCV elevated.  Folate and B12 adequate.  Retic appropriately elevated.      7. Left lower extremity swelling   - US 4/22/24 negative for DVT. CT abdomen pelvis 4/29 without significant or new lymphadenopathy. Improved lately with new compression garments. Continue compression stocking. Following with lymphedema therapy.    PLAN:   1. Proceed with C10 of carboplatin/cabazitaxel.  20% dose reduction of cabazi.  Consider dose reduction of carbo next if neuropathy is worse.    2.  Increase Prednisone as needed to 5-10mg daily. He denies the need for increase of pain medications at this time.   3. Continue acupuncture, gabapentin and duloxetine for neuropathy.     4. Return in 3 weeks for C11    5. Restaging in 3 months with CT CAP and NM bone scan, scheduled end of September, early October.  Sooner if new pain or symptoms.        The longitudinal plan of care for the diagnosis(es)/condition(s) as documented were addressed during this visit. Due to the added complexity in care, I will continue to support Walter in the subsequent management and with ongoing continuity of care.        LATESHA Humphreys CNP

## 2024-08-26 NOTE — TELEPHONE ENCOUNTER
Received [employer] message from patient requesting refill of duloxetine.     Last refill: 7/24/2024  Last office visit: 7/24/2024  Scheduled for follow up 9/23/2024     Will route request to NP for review.     Reviewed [place]  Report.

## 2024-08-26 NOTE — NURSING NOTE
Oncology Rooming Note    August 26, 2024 11:58 AM   Walter Reyes is a 64 year old male who presents for:    Chief Complaint   Patient presents with    Port Draw     Labs drawn via port by RN in lab. VS taken.     Oncology Clinic Visit     Malignant neoplasm of prostate metastatic to bone     Initial Vitals: /76 (BP Location: Right arm, Patient Position: Sitting, Cuff Size: Adult Large)   Pulse 73   Temp 98.9  F (37.2  C) (Oral)   Resp 16   Wt 113.1 kg (249 lb 6.4 oz)   SpO2 96%   BMI 33.82 kg/m   Estimated body mass index is 33.82 kg/m  as calculated from the following:    Height as of 7/24/24: 1.829 m (6').    Weight as of this encounter: 113.1 kg (249 lb 6.4 oz). Body surface area is 2.4 meters squared.  Mild Pain (2) Comment: Data Unavailable   No LMP for male patient.  Allergies reviewed: Yes  Medications reviewed: Yes    Medications: MEDICATION REFILLS NEEDED TODAY. Provider was notified.  Pharmacy name entered into EPIC:    [facility] [place], [place] - [address] [facility] - [place], [place] - [address] [facility] [place], [place] - [phone]  [facility] [place], [place] - [id number] [address]    Frailty Screening:   Is the patient here for a new oncology consult visit in cancer care? 2. No      Clinical concerns: Needs refills on compazine; very fatigued; neuropathy and pain increased   Katty Echavarria

## 2024-08-26 NOTE — NURSING NOTE
Chief Complaint   Patient presents with    Port Draw     Labs drawn via port by RN in lab. VS taken.      Labs drawn via port by RN. Port accessed with 20g flat needle. Flushed with saline and heparin. Pt tolerated well. Vitals taken. Pt checked into next appt.     Zulma Street RN

## 2024-08-28 LAB — TESTOST SERPL-MCNC: <2 NG/DL (ref 240–950)

## 2024-09-04 DIAGNOSIS — G89.3 CANCER ASSOCIATED PAIN: ICD-10-CM

## 2024-09-04 DIAGNOSIS — C61 PROSTATE CANCER (H): ICD-10-CM

## 2024-09-04 RX ORDER — FENTANYL 25 UG/1
1 PATCH TRANSDERMAL
Qty: 10 PATCH | Refills: 0 | Status: SHIPPED | OUTPATIENT
Start: 2024-09-04 | End: 2024-10-07

## 2024-09-04 NOTE — TELEPHONE ENCOUNTER
Received [employer] message from patient requesting refill of fentanyl.     Last refill: 8/5/24  Last office visit: 7/24/24  Scheduled for follow up 9/23/24     Will route request to NP for review.     Reviewed [place]  Report.     colostrum

## 2024-09-05 NOTE — PROGRESS NOTES
No interactions with ivermectin and currently listed medications found using Epic drug interaction .      No interactions specifically with carboplatin/cabazitaxel/prednisone found using [employer] interaction .

## 2024-09-13 NOTE — PROGRESS NOTES
[facility] Oncology Followup  Sep 16, 2024    REASON FOR VISIT: Follow-up for metastatic castration-resistant prostate cancer.      INTERVAL HISTORY:   Walter is seen today accompanied by his wife.  - Doing okay.   - Neuropathy seems to be worse in the feet up to the ankles with pain, tingling, and numbness. Continues to have stable numbness in the fingertips. He is noticing changes with his balance due to the neuropathy but denies falls.  - He has loss weight.   - Endorses nausea the first 5 days, well controlled with scheduling compazine the first week. No vomiting.   - Endorses constipation the first week and then transitions to diarrhea daily x ~once per week. He has both stool softeners and imodium prn.    - He began ivermectin 12 mg daily since 9/5/24 for cancer treatment.   - He fevers or chills.  - He has dyspnea on exertion, stable from prior. No chest pain or cough.  - Energy is overall low since starting chemo and slowly decreasing. Typically feels low energy the first 4 days with gradual improvement.   - Endorses anorexia.   - Walter and his wife have a couple of trips coming up that they are excited about. They are going to the [employer] in a week and a half and [place] in [place].     Review of Systems:  Remainder of 12 point ROS reviewed and otherwise negative except as in interval history.       ONCOLOGY HISTORY: Mr. 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 His oncologic history is detailed below.     1. 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. Prior PSA were 1.4 on 8/16/17,  "2.4 on 6/28/16, 2.9 on 6/28/16, and as low as 0.4 on 3/26/2003.   - 1/08/2019: Consultation with Sarah Wilson CNP in Urology clinic. Repeat PSA 9.6.  - 1/16/2019: MRI prostate with contrast - \"This examination is characterized as PIRADS 5- very high probability. Clinically significant cancer is highly likely to be present. There is a large, invasive mass arising from the right peripheral zone and extending into the neurovascular bundle, seminal vesicle, and along the anterolateral right mesorectal fascia. Metastatic right external iliac lymph node. Metastatic lesion in the posterior/superior right acetabulum.\"  - 1/25/2019: CT abdomen and pelvis with IV contrast - \"1. Heterogeneous enhancing mass posteriorly in the upper pole of the right kidney measures 4.5 x 5.8 x 5.7 cm (AP by transverse by craniocaudal). It has a small nodular component extending posterior medially which abuts the right psoas muscle. This nodular extension measures 2.1 x 2.1 cm. Minimal stranding about the mass. No definite thrombus within the right renal vein. This renal mass is compatible with a renal cell carcinoma. A paraaortic lymph node situated immediately posterior to the left renal vein measures 1.1 cm in short axis, suspicious for metastasis. 2. A 2 cm right external iliac lymph node is also suspicious for metastases. 3. Multiple sclerotic osseous lesions suspicious for metastases. These include 0.8 and 0.6 cm sclerotic lesions laterally in the right iliac wing (images 53 and 62 respectively). Ill-defined groundglass density laterally in the right acetabulum measuring 1.7 x 1.2 cm corresponds with the lesion identified on MRI. A 0.4 cm groundglass density in the left acetabulum. Sclerotic lesion in the left femoral neck measures 0.9 x 1.6 cm (image 81). Sclerotic metastases would be more compatible with prostate metastases. 4. A 3 subcentimeter hepatic lesions are indeterminate. Metastases would be a consideration. These can be " "further characterized with liver MRI.\"  - 1/25/2019: NM bone scan - \"There is focal bony uptake in the left femoral neck, right acetabulum, right of midline at the S1 or L5 level of the spine, within multiple bilateral ribs, in the C7 or T1 level of the spine, and anteriorly within the skull. Findings are suspicious for metastases.\"  - 1/31/19: CT chest with contrast - \" No suspicious nodules in the chest.  Stable appearance of a 5.8 cm right renal mass concerning for renal carcinoma until proven otherwise.  Stable indeterminant subcentimeter hypodensities in the liver.  Multifocal osteoblastic metastasis including 2.5 cm lesion in the right fifth rib posteriorly and a 1.6 cm lesion in the right third rib posteriorly. No lytic lesions identified.\"  - 2/6/19: CT-guided right sclerotic fifth rib lesion biopsy - \"Metastatic carcinoma, consistent with prostate primary.  Immunohistochemical stains performed show the metastatic carcinoma stains positive for NKX3.1 (prostate marker), negative for STACIE 3 and PAX8, which supports the above diagnosis.\"  - 2/15/19: Started Casodex 50mg every day and consented for the biobanking protocol. 3/18/19 - stopped Casodex.  - 2/19/19: Case discussed in tumor board - recommendation for nephectomy for suspected malignant right renal mass.   - 2/26/19: Started Lupron 22.5mg every 3 months.   - 5/8/19: Started Docetaxel 75mg/m2 IV every 3 weeks. 06/18/19 - cycle 3, 7/10/19 - cycle 4, 07/31/19 - cycle 5, 08/21/19 - cycle 6.   - 10/2/19: Restaging CT CAP and NM Bone Scan showed improved osseous disease, no evidence of recurrent or new metastatic disease.  - 1/5/20: Restaging CT CAP and NM Bone Scan showed stable osseous disease, no evidence of recurrent or new metastatic disease.  - 4/6/20: NM bone scan with slightly improved uptake in known skeletal mets. CT C/A/P with contrast with stable osseous mets, no yusuf enlargement, no new visceral mets etc.  - 04/08/20: Zometa every 3 months.  - " "10/5/20: CT C/A/P with contrast and NM bone scan - SD.   - 1/4/21: CT C/A/P with contrast and NM bone scan - SD but slight increase in right 5th rib tracer uptake and in posterior L5 sclerosis. 1/6/21  PSA = 0.29  - 03/29/21: CT C/A/P with contrast - single new right sacral 8mm bone lesion (suspicious), other bone mets stable. No visceral/yusuf disease. Nephrectomy site without recurrence. NM bone scan - SD but \"increased uptake associated with the prior lesions of the lower cervical spine, posterior right fourth rib and right L5 posterior arch elements. Otherwise unchanged uptake associated with the proximal left femur and left anterior fourth rib. Similar uptake of the left halux, possibly secondary to degenerative osteoarthritis or gout.\"    3/31/21 PSA = 0.44  7/7/21  PSA = 0.47  11/2021 PSA = 1.05  -- Start XTANDI  12/16/21 PSA =0.22  2/11/22 PSA= 0.50  3/22/22 PSA=0.72  5/5/2022 PSA=1.79  7/11/2022 PSA=2.16 --Start cycle 1 docetaxel   8/22/22 PSA = 1.36  9/12/22 PSA = 1.09  10/24/22 Cycle #6 of Docetaxel. End of treatment  1/9/23  PSA =0.66  3/20/23  PSA=1.54  4/21/23 PSA = 1.81  T=15  5/22/23 C1 Pluvicto   06/07/23          PSA = 1.42  7/18/23 PSA=1.75, C2 Pluvicto   8/28/23 Transfer of care initial visit for Tabby.  PSA 2.06.  C3 Pluvicto scheduled for 8/30.  Proceed with dose as planned.  MR L femur, ortho referral, PSMA PET ordered for prior to follow-up.    10/2/23 Palliative RT to L femur metastasis complete.  PSMA PET with mixed response.  PSA 2.21.  RT to L femur pending.  Continue with Dose #4 of Pluvicto despite mixed response to therapy.  Dex course for possible pain flare with RT.    11/8/23  Follow-up prior to Dose #5 Pluvicto.  More pain in chest/ribs/back. PSA 4.38.  Testosterone=3.  Rad onc referral for ribs.  Biopsy progressive lesion for progression on Pluvicto.  Cabazitaxel scheduled for follow-up appointment.  Tempus blood testing unrevealing for targetable mutation.    11/27/23 Bx " "completed from R iliac crest but unrevealing for either PCa or RCC.  Start cabazitaxel C1D1.  PSA 2.81.    12/19/23 PSA = 3.66  1/8/24 PSA = 3.55  1/29/24 PSA = 3.07  2/19/24 PSA= 2.21-Start carboplatin/cabazitaxel.    3/11/24 PSA= C2 Carbo/cabazi.  PSA 1.99.    4/01/24 PSA= 1.74. C3 Carbo/cabazi.   4/22/24 PSA= 1.85. C4 Carbo/cabazi.   5/13/24 PSA = 2.06. C5 Carbo/cabazi.   6/3/24 PSA = 2.68, cycle 6 Carboplatin/cabazitaxel  6/24/24 PSA 3.51, C7 carboplatin/cabazitaxel.  Bony lesions stable on NM bone scan.  Reticulonodular densities noted in lungs as possible infection vs drug reaction.  Add chromogranin A to next labs.   7/15/24 PSA= 5.07, chromogranin A pending, cycle 8  8/5/24 PSA= 7.12, Cycle 9 carboplatin/cabazitaxel  8/26/24 PSA= 9.88, cycle 10 carbo/cabazitaxel  9/16/24 PSA= 16.53, cycle 11 carbo/cabazitaxel. Dose reduce carboplatin from AUC 4 to AUC 3 due to fatigue, anorexia, and peripheral neuropathy.     Radiation history:   -C7         3,000 cGy       06 X      8/05/2021        8/18/2021        13       10  -2 Sacrum          2,500 cGy       18 X      4/12/2022        4/18/2022         6           -Sacrum retreat               700 cGy        18 X      2/28/2023        2/28/2023  -Left femur to 2000 cGy in 5 fractions, completed 10/10/2023   -bilateral posterior chest wall at an area of osseous involvement of the ribs and adjacent T4 and 5 spine, to be delivered to 2000 cGy in 5 fractions.  completed 12/13-12/19/23.    2. Stage III ([id number]), grade 3 of 4, clear-cell RCC of right kidney:  - Incidentally diagnosed as above.   - Underwent curative-intent robotic right radical nephrectomy with Dr. Wesley Cleveland on 3/19/19. No tumor spillage per op report.   - Path showed: \"Histologic Type: Clear cell renal cell carcinoma; Sarcomatoid Features: Not identified; Histologic Grade: Nucleolar grade 3 (WHO/ISUP). Extent Tumor Size: 4.3 cm. Microscopic Tumor Extension: Tumor extension into renal sinus (in " "vascular structures). Margins: Negative. Tumor Necrosis: Present; focal. Lymph-Vascular Invasion: Not identified.  Pathologic Staging (pTNM) Primary Tumor (pT): pT3a: Tumor extends into renal vein branches/renal sinus. Regional Lymph Nodes (pN): pNX. Number of Lymph Nodes Examined: 0 Distant Metastasis (pM): pM N/A.\"  - Restaging scans as above.  No current concerns for recurrence.      PAST MEDICAL HISTORY:  Past Medical History:   Diagnosis Date    Cancer of kidney, right (H)     Complication of anesthesia     slow wakeup     Malignant neoplasm of prostate metastatic to bone (H) 2/14/2019    Thyroid disease      CURRENT MEDICATIONS:   Current Outpatient Medications:     atorvastatin (LIPITOR) 80 MG tablet, Take 80 mg by mouth daily, Disp: , Rfl:     BERBERINE CHLORIDE PO, , Disp: , Rfl:     calcium citrate-vitamin D (CITRACAL) 315-250 MG-UNIT TABS per tablet, Take 500 mg by mouth 2 times daily With 800 Vd per pt, Disp: , Rfl:     cycloSPORINE (RESTASIS) 0.05 % ophthalmic emulsion, Place 1 drop into both eyes 2 times daily , Disp: , Rfl:     dexAMETHasone (DECADRON) 4 MG tablet, Take 2 tablets (8 mg) by mouth daily Take for 3 days, starting the day after chemo. Take with food., Disp: 6 tablet, Rfl: 2    dexAMETHasone (DECADRON) 4 MG tablet, Take 1 tablet (4 mg) by mouth daily, Disp: 7 tablet, Rfl: 2    DULoxetine (CYMBALTA) 60 MG capsule, Take 1 capsule (60 mg) by mouth daily., Disp: 90 capsule, Rfl: 1    fentaNYL (DURAGESIC) 25 mcg/hr 72 hr patch, Place 1 patch onto the skin every 72 hours. remove old patch., Disp: 10 patch, Rfl: 0    furosemide (LASIX) 20 MG tablet, Take 1 tablet (20 mg) by mouth daily, Disp: 15 tablet, Rfl: 1    gabapentin (NEURONTIN) 300 MG capsule, Take 1 capsule (300 mg) by mouth every morning AND 2 capsules (600 mg) daily at 2 pm AND 2 capsules (600 mg) at bedtime., Disp: 150 capsule, Rfl: 3    levothyroxine (SYNTHROID/LEVOTHROID) 112 MCG tablet, Take 112 mcg by mouth daily, Disp: , Rfl:     " lidocaine, viscous, (XYLOCAINE) 2 % solution, Swish and swallow 15 mLs in mouth every 3 hours as needed for pain (before meals and at bedtime) ; Max 8 doses/24 hour period., Disp: 100 mL, Rfl: 1    loratadine (CLARITIN) 10 MG tablet, Take 10 mg by mouth daily, Disp: , Rfl:     Multiple Vitamins-Minerals (MULTIVITAMIN ADULT EXTRA C PO), Take 1 tablet by mouth every 24 hours, Disp: , Rfl:     naloxone (NARCAN) 4 MG/0.1ML nasal spray, Spray 1 spray (4 mg) into one nostril alternating nostrils as needed for opioid reversal every 2-3 minutes until assistance arrives, Disp: 0.2 mL, Rfl: 1    Nutritional Supplements (SALMON OIL) CAPS, Take 2 capsules by mouth daily , Disp: , Rfl:     omeprazole (PRILOSEC) 20 MG DR capsule, Take 20 mg by mouth daily, Disp: , Rfl:     ondansetron (ZOFRAN) 8 MG tablet, Take 1 tablet (8 mg) by mouth every 8 hours as needed for nausea (vomiting), Disp: 30 tablet, Rfl: 2    ondansetron (ZOFRAN) 8 MG tablet, Take 1 tablet (8 mg) by mouth every 8 hours as needed for nausea, Disp: 30 tablet, Rfl: 4    oxyCODONE (ROXICODONE) 5 MG tablet, Take 1-2 tablets (5-10 mg) by mouth every 3 hours as needed for severe pain, Disp: 90 tablet, Rfl: 0    predniSONE (DELTASONE) 5 MG tablet, Take 1-2 tablets (5-10 mg) by mouth 2 times daily (with meals) for 21 days., Disp: 84 tablet, Rfl: 0    prochlorperazine (COMPAZINE) 10 MG tablet, Take 1 tablet (10 mg) by mouth every 6 hours as needed for nausea or vomiting, Disp: 30 tablet, Rfl: 5    tamsulosin (FLOMAX) 0.4 MG capsule, Take 1 capsule (0.4 mg) by mouth daily, Disp: 30 capsule, Rfl: 3    triamterene-HCTZ (MAXZIDE-25) 37.5-25 MG tablet, Take 1 tablet by mouth daily, Disp: , Rfl:       Physical Exam:   /69 (BP Location: Left arm, Patient Position: Sitting, Cuff Size: Adult Large)   Pulse 86   Temp 97.7  F (36.5  C) (Oral)   Resp 18   Wt 109.5 kg (241 lb 6.4 oz)   SpO2 96%   BMI 32.74 kg/m    Wt Readings from Last 10 Encounters:   08/26/24 113.1 kg (249  lb 6.4 oz)   08/05/24 112 kg (247 lb)   07/24/24 113.4 kg (250 lb)   07/15/24 113.9 kg (251 lb)   07/03/24 114.8 kg (253 lb)   06/24/24 114.7 kg (252 lb 14.4 oz)   06/03/24 116 kg (255 lb 12.8 oz)   05/13/24 117.9 kg (259 lb 14.4 oz)   04/22/24 118.3 kg (260 lb 14.4 oz)   04/01/24 119.4 kg (263 lb 3.2 oz)   General: The patient is a pleasant male in no acute distress.  HEENT: EOMI. Sclerae are anicteric. Mucous membranes moist without lesions or thrush.   Lymph: Neck is supple with no lymphadenopathy in the cervical or supraclavicular areas.   Heart: Regular rate and rhythm.   Lungs: normal work of breathing on RA, clear to auscultation throughout   Abdomen: Bowel sounds present, soft, non-distended.    Extremities: trace bilateral lower extremity edema, nonpitting edema from thigh through lower leg in left leg    Neuro: Cranial nerves II through XII are grossly intact.  Skin: No rashes, petechiae, or bruising noted on exposed skin.  Psych: affect bright, alert and oriented    LABORATORY DATA:  Most Recent 3 CBC's:  Recent Labs   Lab Test 08/26/24 1137 08/05/24  0825 07/15/24  0628   WBC 4.8 4.6 3.7*   HGB 10.1* 10.4* 9.3*   * 107* 107*   * 134* 147*   ANEUTAUTO 3.4 3.3 2.6     Most Recent 3 BMP's:  Recent Labs   Lab Test 08/26/24  1137 08/05/24  0825 07/15/24  0628    142 141   POTASSIUM 3.7 3.6 3.9   CHLORIDE 105 107 107   CO2 23 23 25   BUN 15.2 14.0 11.1   CR 0.91 0.92 0.95   ANIONGAP 13 12 9   BETHANY 8.9 8.9 8.9   * 158* 105*   PROTTOTAL 6.5 6.5 6.2*   ALBUMIN 4.0 4.1 4.0    Most Recent 3 LFT's:  Recent Labs   Lab Test 08/26/24  1137 08/05/24  0825 07/15/24  0628   AST 23 20 23   ALT 20 19 18   ALKPHOS 112 94 73   BILITOTAL 0.3 0.4 0.3        PSA Tumor Marker   Latest Ref Rng 0.00 - 4.50 ng/mL   3/11/2024  8:49 AM 1.99    4/1/2024  9:30 AM 1.74    4/22/2024  9:09 AM 1.85    5/13/2024  9:16 AM 2.06    6/3/2024  7:52 AM 2.68    6/24/2024  7:54 AM 3.51    7/15/2024  6:28 AM 5.07 (H)   "  8/5/2024  8:25 AM 7.12 (H)    8/26/2024  11:37 AM 9.88 (H)    9/16/2024  11:10 AM 16.53 (H)           ASSESSMENT & PLAN: Mr. Reyes is a 64 year old gentleman with metastatic castration sensitive prostate adenocarcinoma as well as an incidentally diagnosed stage III clear-cell renal cell carcinoma of the right kidney (s/p radical R nephrectomy 3/19/19).     1. Metastatic castration-resistant prostate cancer, with involvement of the bones and RPLN:   - Patient met the criteria for CHAARTED \"high-volume\" metastatic hormone-sensitive prostate cancer. He opted for docetaxel in combination with ADT (per JOSEFA, [id number], FELIPEEDSEEMA). He was subsequently treated with docetaxel x6 doses in the [facility] setting and enzalutamide.  He initiated Pluvicto in May 2023 with an initial slight decline in PSA, but this began to rise just prior to Cycle 3.  Given his XR evidence of progression in L femur and PSA rise, we re-evaluated his progression with PSMA PET scan in September 2023, with note of mixed response.  Canceled Pluvicto Dose for November due to symptomatic progression with PSA rise. PSA is labile. Biopsy results from 11/21 was unrevealing for evaluation of NEPC vs small cell given progression without a significant rise in PSA. Tempus peripheral blood mutation analysis is unrevealing for targetable mutations. Cabazitaxel started 11/27/23 given progression on Pluvicto. Carboplatin added 2/2024 and cabazitaxel dose reduced by 20% in April 2024 due to neuropathy.  Restaging with pulmonary consolidative findings without mass-like features.  Chromogranin A 271 on 7/15/24.   -Feeling more fatigued with more neuropathy and weight loss. Of note his PSA with slight increase again today, up to 16.53 ng/mL. Discussed with Dr. Mcnair, patient is clinically stable, will continue current regimen for now. He is due for restaging in early October. He may be a candidate for ODM-211 or radium.    -Lupron every 3 months, next due " 10/7/2024  -Cabazitaxel to be continued at 20% dose reduction. Will furthermore reduce carbplatin to an AUC of 3 due to fatigue, anorexia, and worsening peripheral neuropathy.      2. Pressure in chest, resolved:  Has an ongoing sensation of pressure/need to cough in his chest, primarily against his sternum. Vital signs stable, negative pulmonary physical exam. No chest pain.     3. Bone metastases:    - Noted to have osseous metastatic disease at the time of diagnosis. S/p radiation to C spine and sacrum (Re-irradiation to sacrum).   - pain mgmt per palliative care, pain is now well controlled with fentanyl patch. Dexamethasone burst is helpful for pain flares and he has these available as needed.    -Will increase Prednisone to 5-10mg daily (can increase to 10mg dose as needed for pain)  - RT to L femur 10/2023.  RT for bilateral posterior ribs 12/2023 with Dr. Albert.  - Encouraged to continue calcium and vitamin D supplementation; continue Zometa 4mg IV every ~3 months.   - Last Zometa given 8/26/2024. Next due 11/2024.      4. Stage 3, UISS-high risk ccRCC: s/p R nephrectomy   - Nephrectomy 3/19/19 and noted incidentally.  He is now 4.5 years post-op without evidence for recurrence.   - At this point, there is a minimal risk of recurrence of his RCC given the time since surgery without the use of adjuvant immunotherapy. There is no suspicious adenopathy or other features on his most recent imaging studies.    - Will continue to monitor with imaging planned for prostate cancer.      5. Sensory neuropathy, Toes/feet Grade 2  - continue monitor for worsening with cabazitaxel.   - will dose reduced carboplatin.  - Continue acupuncture once weekly, has decreased to every 3 weeks but finds benefit and plans to increase back to weekly after travels.   - He is on gabapentin and duloxetine managed by palliative care   -duloxetine dose recently increased with further improvement in neuropathy. Tried weaning off  gabapentin and pain in back and ribs was uncontrolled again. Continue duloxetine and gabapentin per palliative.      6. Anemia  - likely secondary to chemotherapy. Iron levels adequate on 4/22/24. MCV elevated.  Folate and B12 adequate.  Retic appropriately elevated.  Stable 9/16/24.     7. Left lower extremity swelling   - US 4/22/24 negative for DVT. CT abdomen pelvis 4/29 without significant or new lymphadenopathy. Improved lately with new compression garments. Continue compression stocking. Following with lymphedema therapy.    PLAN:   1. Proceed with C11 of carboplatin/cabazitaxel.  20% dose reduction of cabazi and change carboplatin to AUC 3.    2. Continue Prednisone as needed to 5-10mg daily. He denies the need for increase of pain medications at this time.   3. Continue acupuncture, gabapentin and duloxetine for neuropathy.     4. Return in 3 weeks for C12 with Dr. Mcnair with CT CAP and NM Bone scan prior.     41 minutes spent on the date of the encounter doing chart review, review of test results, interpretation of tests, patient visit, and documentation     Yamilet Espinoza PA-C

## 2024-09-16 ENCOUNTER — ONCOLOGY VISIT (OUTPATIENT)
Dept: ONCOLOGY | Facility: CLINIC | Age: 64
End: 2024-09-16
Attending: STUDENT IN AN ORGANIZED HEALTH CARE EDUCATION/TRAINING PROGRAM
Payer: COMMERCIAL

## 2024-09-16 ENCOUNTER — APPOINTMENT (OUTPATIENT)
Dept: LAB | Facility: CLINIC | Age: 64
End: 2024-09-16
Attending: STUDENT IN AN ORGANIZED HEALTH CARE EDUCATION/TRAINING PROGRAM
Payer: COMMERCIAL

## 2024-09-16 VITALS
WEIGHT: 241.4 LBS | BODY MASS INDEX: 32.74 KG/M2 | RESPIRATION RATE: 18 BRPM | OXYGEN SATURATION: 96 % | TEMPERATURE: 97.7 F | SYSTOLIC BLOOD PRESSURE: 108 MMHG | HEART RATE: 86 BPM | DIASTOLIC BLOOD PRESSURE: 69 MMHG

## 2024-09-16 DIAGNOSIS — C61 MALIGNANT NEOPLASM OF PROSTATE METASTATIC TO BONE (H): Primary | ICD-10-CM

## 2024-09-16 DIAGNOSIS — C79.51 MALIGNANT NEOPLASM OF PROSTATE METASTATIC TO BONE (H): Primary | ICD-10-CM

## 2024-09-16 DIAGNOSIS — Z76.89 PREVENTION OF CHEMOTHERAPY-INDUCED NEUTROPENIA: ICD-10-CM

## 2024-09-16 LAB
ALBUMIN SERPL BCG-MCNC: 3.8 G/DL (ref 3.5–5.2)
ALP SERPL-CCNC: 112 U/L (ref 40–150)
ALT SERPL W P-5'-P-CCNC: 24 U/L (ref 0–70)
ANION GAP SERPL CALCULATED.3IONS-SCNC: 13 MMOL/L (ref 7–15)
AST SERPL W P-5'-P-CCNC: 26 U/L (ref 0–45)
BASOPHILS # BLD AUTO: 0 10E3/UL (ref 0–0.2)
BASOPHILS NFR BLD AUTO: 0 %
BILIRUB SERPL-MCNC: 0.3 MG/DL
BUN SERPL-MCNC: 17 MG/DL (ref 8–23)
CALCIUM SERPL-MCNC: 8.6 MG/DL (ref 8.8–10.4)
CHLORIDE SERPL-SCNC: 107 MMOL/L (ref 98–107)
CREAT SERPL-MCNC: 1.07 MG/DL (ref 0.67–1.17)
EGFRCR SERPLBLD CKD-EPI 2021: 78 ML/MIN/1.73M2
EOSINOPHIL # BLD AUTO: 0 10E3/UL (ref 0–0.7)
EOSINOPHIL NFR BLD AUTO: 0 %
ERYTHROCYTE [DISTWIDTH] IN BLOOD BY AUTOMATED COUNT: 17.5 % (ref 10–15)
GLUCOSE SERPL-MCNC: 114 MG/DL (ref 70–99)
HCO3 SERPL-SCNC: 22 MMOL/L (ref 22–29)
HCT VFR BLD AUTO: 31.7 % (ref 40–53)
HGB BLD-MCNC: 10.2 G/DL (ref 13.3–17.7)
IMM GRANULOCYTES # BLD: 0.1 10E3/UL
IMM GRANULOCYTES NFR BLD: 1 %
LYMPHOCYTES # BLD AUTO: 0.9 10E3/UL (ref 0.8–5.3)
LYMPHOCYTES NFR BLD AUTO: 9 %
MCH RBC QN AUTO: 34.7 PG (ref 26.5–33)
MCHC RBC AUTO-ENTMCNC: 32.2 G/DL (ref 31.5–36.5)
MCV RBC AUTO: 108 FL (ref 78–100)
MONOCYTES # BLD AUTO: 1 10E3/UL (ref 0–1.3)
MONOCYTES NFR BLD AUTO: 11 %
NEUTROPHILS # BLD AUTO: 7.6 10E3/UL (ref 1.6–8.3)
NEUTROPHILS NFR BLD AUTO: 79 %
NRBC # BLD AUTO: 0 10E3/UL
NRBC BLD AUTO-RTO: 0 /100
PLATELET # BLD AUTO: 141 10E3/UL (ref 150–450)
POTASSIUM SERPL-SCNC: 3.8 MMOL/L (ref 3.4–5.3)
PROT SERPL-MCNC: 6.3 G/DL (ref 6.4–8.3)
PSA SERPL DL<=0.01 NG/ML-MCNC: 16.53 NG/ML (ref 0–4.5)
RBC # BLD AUTO: 2.94 10E6/UL (ref 4.4–5.9)
SODIUM SERPL-SCNC: 142 MMOL/L (ref 135–145)
WBC # BLD AUTO: 9.6 10E3/UL (ref 4–11)

## 2024-09-16 PROCEDURE — 96372 THER/PROPH/DIAG INJ SC/IM: CPT

## 2024-09-16 PROCEDURE — 96377 APPLICATON ON-BODY INJECTOR: CPT | Mod: 59

## 2024-09-16 PROCEDURE — 85025 COMPLETE CBC W/AUTO DIFF WBC: CPT

## 2024-09-16 PROCEDURE — 258N000003 HC RX IP 258 OP 636

## 2024-09-16 PROCEDURE — 250N000011 HC RX IP 250 OP 636

## 2024-09-16 PROCEDURE — 99213 OFFICE O/P EST LOW 20 MIN: CPT

## 2024-09-16 PROCEDURE — 84155 ASSAY OF PROTEIN SERUM: CPT

## 2024-09-16 PROCEDURE — 96413 CHEMO IV INFUSION 1 HR: CPT

## 2024-09-16 PROCEDURE — 36591 DRAW BLOOD OFF VENOUS DEVICE: CPT

## 2024-09-16 PROCEDURE — 96375 TX/PRO/DX INJ NEW DRUG ADDON: CPT

## 2024-09-16 PROCEDURE — 96417 CHEMO IV INFUS EACH ADDL SEQ: CPT

## 2024-09-16 PROCEDURE — 84403 ASSAY OF TOTAL TESTOSTERONE: CPT

## 2024-09-16 PROCEDURE — 99215 OFFICE O/P EST HI 40 MIN: CPT

## 2024-09-16 PROCEDURE — 84153 ASSAY OF PSA TOTAL: CPT

## 2024-09-16 PROCEDURE — 96367 TX/PROPH/DG ADDL SEQ IV INF: CPT

## 2024-09-16 PROCEDURE — 84450 TRANSFERASE (AST) (SGOT): CPT

## 2024-09-16 RX ORDER — MEPERIDINE HYDROCHLORIDE 25 MG/ML
25 INJECTION INTRAMUSCULAR; INTRAVENOUS; SUBCUTANEOUS EVERY 30 MIN PRN
Status: CANCELLED | OUTPATIENT
Start: 2024-09-16

## 2024-09-16 RX ORDER — DIPHENHYDRAMINE HYDROCHLORIDE 50 MG/ML
50 INJECTION INTRAMUSCULAR; INTRAVENOUS
Status: CANCELLED
Start: 2024-09-16

## 2024-09-16 RX ORDER — HEPARIN SODIUM (PORCINE) LOCK FLUSH IV SOLN 100 UNIT/ML 100 UNIT/ML
5 SOLUTION INTRAVENOUS ONCE
Status: COMPLETED | OUTPATIENT
Start: 2024-09-16 | End: 2024-09-16

## 2024-09-16 RX ORDER — PALONOSETRON 0.05 MG/ML
0.25 INJECTION, SOLUTION INTRAVENOUS ONCE
Status: COMPLETED | OUTPATIENT
Start: 2024-09-16 | End: 2024-09-16

## 2024-09-16 RX ORDER — HEPARIN SODIUM (PORCINE) LOCK FLUSH IV SOLN 100 UNIT/ML 100 UNIT/ML
5 SOLUTION INTRAVENOUS
Status: DISCONTINUED | OUTPATIENT
Start: 2024-09-16 | End: 2024-09-16 | Stop reason: HOSPADM

## 2024-09-16 RX ORDER — PREDNISONE 5 MG/1
5-10 TABLET ORAL 2 TIMES DAILY WITH MEALS
Qty: 84 TABLET | Refills: 0 | Status: SHIPPED | OUTPATIENT
Start: 2024-09-16 | End: 2024-10-07

## 2024-09-16 RX ORDER — ALBUTEROL SULFATE 90 UG/1
1-2 AEROSOL, METERED RESPIRATORY (INHALATION)
Status: CANCELLED
Start: 2024-09-16

## 2024-09-16 RX ORDER — LORAZEPAM 2 MG/ML
0.5 INJECTION INTRAMUSCULAR EVERY 4 HOURS PRN
Status: CANCELLED | OUTPATIENT
Start: 2024-09-16

## 2024-09-16 RX ORDER — HEPARIN SODIUM (PORCINE) LOCK FLUSH IV SOLN 100 UNIT/ML 100 UNIT/ML
5 SOLUTION INTRAVENOUS
Status: CANCELLED | OUTPATIENT
Start: 2024-09-16

## 2024-09-16 RX ORDER — ALBUTEROL SULFATE 0.83 MG/ML
2.5 SOLUTION RESPIRATORY (INHALATION)
Status: CANCELLED | OUTPATIENT
Start: 2024-09-16

## 2024-09-16 RX ORDER — HEPARIN SODIUM,PORCINE 10 UNIT/ML
5-20 VIAL (ML) INTRAVENOUS DAILY PRN
Status: CANCELLED | OUTPATIENT
Start: 2024-09-16

## 2024-09-16 RX ORDER — METHYLPREDNISOLONE SODIUM SUCCINATE 125 MG/2ML
125 INJECTION, POWDER, LYOPHILIZED, FOR SOLUTION INTRAMUSCULAR; INTRAVENOUS
Status: CANCELLED
Start: 2024-09-16

## 2024-09-16 RX ORDER — PALONOSETRON 0.05 MG/ML
0.25 INJECTION, SOLUTION INTRAVENOUS ONCE
Status: CANCELLED | OUTPATIENT
Start: 2024-09-16

## 2024-09-16 RX ORDER — EPINEPHRINE 1 MG/ML
0.3 INJECTION, SOLUTION INTRAMUSCULAR; SUBCUTANEOUS EVERY 5 MIN PRN
Status: CANCELLED | OUTPATIENT
Start: 2024-09-16

## 2024-09-16 RX ORDER — IVERMECTIN 3 MG/1
TABLET ORAL
COMMUNITY
Start: 2024-09-05

## 2024-09-16 RX ADMIN — PALONOSETRON HYDROCHLORIDE 0.25 MG: 0.25 INJECTION INTRAVENOUS at 12:59

## 2024-09-16 RX ADMIN — SODIUM CHLORIDE 38 MG: 9 INJECTION, SOLUTION INTRAVENOUS at 13:55

## 2024-09-16 RX ADMIN — Medication 5 ML: at 11:02

## 2024-09-16 RX ADMIN — Medication 5 ML: at 15:34

## 2024-09-16 RX ADMIN — FAMOTIDINE 20 MG: 10 INJECTION INTRAVENOUS at 12:59

## 2024-09-16 RX ADMIN — PEGFILGRASTIM 6 MG: KIT SUBCUTANEOUS at 15:02

## 2024-09-16 RX ADMIN — DIPHENHYDRAMINE HYDROCHLORIDE 25 MG: 50 INJECTION, SOLUTION INTRAMUSCULAR; INTRAVENOUS at 13:36

## 2024-09-16 RX ADMIN — SODIUM CHLORIDE 250 ML: 9 INJECTION, SOLUTION INTRAVENOUS at 12:54

## 2024-09-16 RX ADMIN — FOSAPREPITANT: 150 INJECTION, POWDER, LYOPHILIZED, FOR SOLUTION INTRAVENOUS at 13:09

## 2024-09-16 RX ADMIN — CARBOPLATIN 350 MG: 10 INJECTION, SOLUTION INTRAVENOUS at 14:59

## 2024-09-16 ASSESSMENT — PAIN SCALES - GENERAL: PAINLEVEL: MILD PAIN (2)

## 2024-09-16 NOTE — Clinical Note
9/16/2024      Walter Reyes  [address] [facility] [id number]      Dear Colleague,    Thank you for referring your patient, Walter Reyes, to the [facility]. Please see a copy of my visit note below.        [facility] Oncology Followup  Sep 16, 2024    REASON FOR VISIT: Follow-up for metastatic castration-resistant prostate cancer.      INTERVAL HISTORY:       -neuropathy is worse in feet up to ankles- pain,tingling, and numbness. Numbness neuropathy in the fingers.  - nausea first week improved with compazine every 6hours the first 5 days.   - Fatigue/wiped out x 4 days after chemo and then improves.   - Last 8 pounds, low appetite.   - no breathing concerns. -has shortness of breath with climbing stairs, none at rest. No chest pain  - back and fourth BM, typically constipation the first week and diarrhea off and on, 1 episodes takes   - ivermectin 12 mg daily since 9/5/24.         -pain is increased, overall 2/10 in all spots - back, forehead right ribs, femur especially with increased swelling   -compression device at night helps with femur swelling and pain the next day   -overall feeling more run down, doesn't have energy to do a lot of activity, things wear him out faster than before  -has shortness of breath with climbing stairs, none at rest. No chest pain  -appetite is good, denies GI concerns         Review of Systems:  Remainder of 12 point ROS reviewed and otherwise negative except as in interval history.       ONCOLOGY HISTORY: Mr. 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 His oncologic history is detailed below.     1. De deja metastatic prostate adenocarcinoma, stage IV (M1b at diagnosis), high-volume,  "castration-sensitive:  - 12/13/2018: PSA found to be elevated to 9.1 ng/mL on a routine follow-up with primary care provider Dr. Naylor at [facility]. Prior PSA were 1.4 on 8/16/17, 2.4 on 6/28/16, 2.9 on 6/28/16, and as low as 0.4 on 3/26/2003.   - 1/08/2019: Consultation with Sarah Wilson CNP in Urology clinic. Repeat PSA 9.6.  - 1/16/2019: MRI prostate with contrast - \"This examination is characterized as PIRADS 5- very high probability. Clinically significant cancer is highly likely to be present. There is a large, invasive mass arising from the right peripheral zone and extending into the neurovascular bundle, seminal vesicle, and along the anterolateral right mesorectal fascia. Metastatic right external iliac lymph node. Metastatic lesion in the posterior/superior right acetabulum.\"  - 1/25/2019: CT abdomen and pelvis with IV contrast - \"1. Heterogeneous enhancing mass posteriorly in the upper pole of the right kidney measures 4.5 x 5.8 x 5.7 cm (AP by transverse by craniocaudal). It has a small nodular component extending posterior medially which abuts the right psoas muscle. This nodular extension measures 2.1 x 2.1 cm. Minimal stranding about the mass. No definite thrombus within the right renal vein. This renal mass is compatible with a renal cell carcinoma. A paraaortic lymph node situated immediately posterior to the left renal vein measures 1.1 cm in short axis, suspicious for metastasis. 2. A 2 cm right external iliac lymph node is also suspicious for metastases. 3. Multiple sclerotic osseous lesions suspicious for metastases. These include 0.8 and 0.6 cm sclerotic lesions laterally in the right iliac wing (images 53 and 62 respectively). Ill-defined groundglass density laterally in the right acetabulum measuring 1.7 x 1.2 cm corresponds with the lesion identified on MRI. A 0.4 cm groundglass density in the left acetabulum. Sclerotic lesion in the left femoral neck measures 0.9 x 1.6 cm (image " "81). Sclerotic metastases would be more compatible with prostate metastases. 4. A 3 subcentimeter hepatic lesions are indeterminate. Metastases would be a consideration. These can be further characterized with liver MRI.\"  - 1/25/2019: NM bone scan - \"There is focal bony uptake in the left femoral neck, right acetabulum, right of midline at the S1 or L5 level of the spine, within multiple bilateral ribs, in the C7 or T1 level of the spine, and anteriorly within the skull. Findings are suspicious for metastases.\"  - 1/31/19: CT chest with contrast - \" No suspicious nodules in the chest.  Stable appearance of a 5.8 cm right renal mass concerning for renal carcinoma until proven otherwise.  Stable indeterminant subcentimeter hypodensities in the liver.  Multifocal osteoblastic metastasis including 2.5 cm lesion in the right fifth rib posteriorly and a 1.6 cm lesion in the right third rib posteriorly. No lytic lesions identified.\"  - 2/6/19: CT-guided right sclerotic fifth rib lesion biopsy - \"Metastatic carcinoma, consistent with prostate primary.  Immunohistochemical stains performed show the metastatic carcinoma stains positive for NKX3.1 (prostate marker), negative for STACIE 3 and PAX8, which supports the above diagnosis.\"  - 2/15/19: Started Casodex 50mg every day and consented for the biobanking protocol. 3/18/19 - stopped Casodex.  - 2/19/19: Case discussed in tumor board - recommendation for nephectomy for suspected malignant right renal mass.   - 2/26/19: Started Lupron 22.5mg every 3 months.   - 5/8/19: Started Docetaxel 75mg/m2 IV every 3 weeks. 06/18/19 - cycle 3, 7/10/19 - cycle 4, 07/31/19 - cycle 5, 08/21/19 - cycle 6.   - 10/2/19: Restaging CT CAP and NM Bone Scan showed improved osseous disease, no evidence of recurrent or new metastatic disease.  - 1/5/20: Restaging CT CAP and NM Bone Scan showed stable osseous disease, no evidence of recurrent or new metastatic disease.  - 4/6/20: NM bone scan with " "slightly improved uptake in known skeletal mets. CT C/A/P with contrast with stable osseous mets, no yusuf enlargement, no new visceral mets etc.  - 04/08/20: Zometa every 3 months.  - 10/5/20: CT C/A/P with contrast and NM bone scan - SD.   - 1/4/21: CT C/A/P with contrast and NM bone scan - SD but slight increase in right 5th rib tracer uptake and in posterior L5 sclerosis. 1/6/21  PSA = 0.29  - 03/29/21: CT C/A/P with contrast - single new right sacral 8mm bone lesion (suspicious), other bone mets stable. No visceral/yusuf disease. Nephrectomy site without recurrence. NM bone scan - SD but \"increased uptake associated with the prior lesions of the lower cervical spine, posterior right fourth rib and right L5 posterior arch elements. Otherwise unchanged uptake associated with the proximal left femur and left anterior fourth rib. Similar uptake of the left halux, possibly secondary to degenerative osteoarthritis or gout.\"    3/31/21 PSA = 0.44  7/7/21  PSA = 0.47  11/2021 PSA = 1.05  -- Start XTANDI  12/16/21 PSA =0.22  2/11/22 PSA= 0.50  3/22/22 PSA=0.72  5/5/2022 PSA=1.79  7/11/2022 PSA=2.16 --Start cycle 1 docetaxel   8/22/22 PSA = 1.36  9/12/22 PSA = 1.09  10/24/22 Cycle #6 of Docetaxel. End of treatment  1/9/23  PSA =0.66  3/20/23  PSA=1.54  4/21/23 PSA = 1.81  T=15  5/22/23 C1 Pluvicto   06/07/23          PSA = 1.42  7/18/23 PSA=1.75, C2 Pluvicto   8/28/23 Transfer of care initial visit for Tabby.  PSA 2.06.  C3 Pluvicto scheduled for 8/30.  Proceed with dose as planned.  MR L femur, ortho referral, PSMA PET ordered for prior to follow-up.    10/2/23 Palliative RT to L femur metastasis complete.  PSMA PET with mixed response.  PSA 2.21.  RT to L femur pending.  Continue with Dose #4 of Pluvicto despite mixed response to therapy.  Dex course for possible pain flare with RT.    11/8/23  Follow-up prior to Dose #5 Pluvicto.  More pain in chest/ribs/back. PSA 4.38.  Testosterone=3.  Rad onc referral for ribs.  " "Biopsy progressive lesion for progression on Pluvicto.  Cabazitaxel scheduled for follow-up appointment.  Tempus blood testing unrevealing for targetable mutation.    11/27/23 Bx completed from R iliac crest but unrevealing for either PCa or RCC.  Start cabazitaxel C1D1.  PSA 2.81.    12/19/23 PSA = 3.66  1/8/24 PSA = 3.55  1/29/24 PSA = 3.07  2/19/24 PSA= 2.21-Start carboplatin/cabazitaxel.    3/11/24 PSA= C2 Carbo/cabazi.  PSA 1.99.    4/01/24 PSA= 1.74. C3 Carbo/cabazi.   4/22/24 PSA= 1.85. C4 Carbo/cabazi.   5/13/24 PSA = 2.06. C5 Carbo/cabazi.   6/3/24 PSA = 2.68, cycle 6 Carboplatin/cabazitaxel  6/24/24 PSA 3.51, C7 carboplatin/cabazitaxel.  Bony lesions stable on NM bone scan.  Reticulonodular densities noted in lungs as possible infection vs drug reaction.  Add chromogranin A to next labs.   7/15/24 PSA= 5.07, chromogranin A pending, cycle 8  8/5/24 PSA= 7.12, Cycle 9 carboplatin/cabazitaxel  8/26/24 PSA= 9.88, cycle 10 carbo/cabazitaxel    Radiation history:   -C7         3,000 cGy       06 X      8/05/2021        8/18/2021        13       10  -2 Sacrum          2,500 cGy       18 X      4/12/2022        4/18/2022         6           -Sacrum retreat               700 cGy        18 X      2/28/2023        2/28/2023  -Left femur to 2000 cGy in 5 fractions, completed 10/10/2023   -bilateral posterior chest wall at an area of osseous involvement of the ribs and adjacent T4 and 5 spine, to be delivered to 2000 cGy in 5 fractions.  completed 12/13-12/19/23.    2. Stage III ([id number]), grade 3 of 4, clear-cell RCC of right kidney:  - Incidentally diagnosed as above.   - Underwent curative-intent robotic right radical nephrectomy with Dr. Wesley Cleveland on 3/19/19. No tumor spillage per op report.   - Path showed: \"Histologic Type: Clear cell renal cell carcinoma; Sarcomatoid Features: Not identified; Histologic Grade: Nucleolar grade 3 (WHO/ISUP). Extent Tumor Size: 4.3 cm. Microscopic Tumor Extension: Tumor " "extension into renal sinus (in vascular structures). Margins: Negative. Tumor Necrosis: Present; focal. Lymph-Vascular Invasion: Not identified.  Pathologic Staging (pTNM) Primary Tumor (pT): pT3a: Tumor extends into renal vein branches/renal sinus. Regional Lymph Nodes (pN): pNX. Number of Lymph Nodes Examined: 0 Distant Metastasis (pM): pM N/A.\"  - Restaging scans as above.  No current concerns for recurrence.      PAST MEDICAL HISTORY:  Past Medical History:   Diagnosis Date    Cancer of kidney, right (H)     Complication of anesthesia     slow wakeup     Malignant neoplasm of prostate metastatic to bone (H) 2/14/2019    Thyroid disease      CURRENT MEDICATIONS:   Current Outpatient Medications:     atorvastatin (LIPITOR) 80 MG tablet, Take 80 mg by mouth daily, Disp: , Rfl:     BERBERINE CHLORIDE PO, , Disp: , Rfl:     calcium citrate-vitamin D (CITRACAL) 315-250 MG-UNIT TABS per tablet, Take 500 mg by mouth 2 times daily With 800 Vd per pt, Disp: , Rfl:     cycloSPORINE (RESTASIS) 0.05 % ophthalmic emulsion, Place 1 drop into both eyes 2 times daily , Disp: , Rfl:     dexAMETHasone (DECADRON) 4 MG tablet, Take 2 tablets (8 mg) by mouth daily Take for 3 days, starting the day after chemo. Take with food., Disp: 6 tablet, Rfl: 2    dexAMETHasone (DECADRON) 4 MG tablet, Take 1 tablet (4 mg) by mouth daily, Disp: 7 tablet, Rfl: 2    DULoxetine (CYMBALTA) 60 MG capsule, Take 1 capsule (60 mg) by mouth daily., Disp: 90 capsule, Rfl: 1    fentaNYL (DURAGESIC) 25 mcg/hr 72 hr patch, Place 1 patch onto the skin every 72 hours. remove old patch., Disp: 10 patch, Rfl: 0    furosemide (LASIX) 20 MG tablet, Take 1 tablet (20 mg) by mouth daily, Disp: 15 tablet, Rfl: 1    gabapentin (NEURONTIN) 300 MG capsule, Take 1 capsule (300 mg) by mouth every morning AND 2 capsules (600 mg) daily at 2 pm AND 2 capsules (600 mg) at bedtime., Disp: 150 capsule, Rfl: 3    levothyroxine (SYNTHROID/LEVOTHROID) 112 MCG tablet, Take 112 mcg " by mouth daily, Disp: , Rfl:     lidocaine, viscous, (XYLOCAINE) 2 % solution, Swish and swallow 15 mLs in mouth every 3 hours as needed for pain (before meals and at bedtime) ; Max 8 doses/24 hour period., Disp: 100 mL, Rfl: 1    loratadine (CLARITIN) 10 MG tablet, Take 10 mg by mouth daily, Disp: , Rfl:     Multiple Vitamins-Minerals (MULTIVITAMIN ADULT EXTRA C PO), Take 1 tablet by mouth every 24 hours, Disp: , Rfl:     naloxone (NARCAN) 4 MG/0.1ML nasal spray, Spray 1 spray (4 mg) into one nostril alternating nostrils as needed for opioid reversal every 2-3 minutes until assistance arrives, Disp: 0.2 mL, Rfl: 1    Nutritional Supplements (SALMON OIL) CAPS, Take 2 capsules by mouth daily , Disp: , Rfl:     omeprazole (PRILOSEC) 20 MG DR capsule, Take 20 mg by mouth daily, Disp: , Rfl:     ondansetron (ZOFRAN) 8 MG tablet, Take 1 tablet (8 mg) by mouth every 8 hours as needed for nausea (vomiting), Disp: 30 tablet, Rfl: 2    ondansetron (ZOFRAN) 8 MG tablet, Take 1 tablet (8 mg) by mouth every 8 hours as needed for nausea, Disp: 30 tablet, Rfl: 4    oxyCODONE (ROXICODONE) 5 MG tablet, Take 1-2 tablets (5-10 mg) by mouth every 3 hours as needed for severe pain, Disp: 90 tablet, Rfl: 0    predniSONE (DELTASONE) 5 MG tablet, Take 1-2 tablets (5-10 mg) by mouth 2 times daily (with meals) for 21 days., Disp: 84 tablet, Rfl: 0    prochlorperazine (COMPAZINE) 10 MG tablet, Take 1 tablet (10 mg) by mouth every 6 hours as needed for nausea or vomiting, Disp: 30 tablet, Rfl: 5    tamsulosin (FLOMAX) 0.4 MG capsule, Take 1 capsule (0.4 mg) by mouth daily, Disp: 30 capsule, Rfl: 3    triamterene-HCTZ (MAXZIDE-25) 37.5-25 MG tablet, Take 1 tablet by mouth daily, Disp: , Rfl:       Physical Exam:   There were no vitals taken for this visit.  Wt Readings from Last 10 Encounters:   08/26/24 113.1 kg (249 lb 6.4 oz)   08/05/24 112 kg (247 lb)   07/24/24 113.4 kg (250 lb)   07/15/24 113.9 kg (251 lb)   07/03/24 114.8 kg (253 lb)    06/24/24 114.7 kg (252 lb 14.4 oz)   06/03/24 116 kg (255 lb 12.8 oz)   05/13/24 117.9 kg (259 lb 14.4 oz)   04/22/24 118.3 kg (260 lb 14.4 oz)   04/01/24 119.4 kg (263 lb 3.2 oz)   General: The patient is a pleasant male in no acute distress.  HEENT: EOMI. Sclerae are anicteric. Mucous membranes moist without lesions or thrush.   Lymph: Neck is supple with no lymphadenopathy in the cervical or supraclavicular areas.   Heart: Regular rate and rhythm.   Lungs: normal work of breathing on RA, clear to auscultation throughout   Abdomen: Bowel sounds present, soft, non-distended.    Extremities: trace bilateral lower extremity edema, nonpitting edema from thigh through lower leg in left leg    Neuro: Cranial nerves II through XII are grossly intact.  Skin: No rashes, petechiae, or bruising noted on exposed skin.  Psych: affect bright, alert and oriented    LABORATORY DATA:  Most Recent 3 CBC's:  Recent Labs   Lab Test 08/26/24  1137 08/05/24  0825 07/15/24  0628   WBC 4.8 4.6 3.7*   HGB 10.1* 10.4* 9.3*   * 107* 107*   * 134* 147*   ANEUTAUTO 3.4 3.3 2.6     Most Recent 3 BMP's:  Recent Labs   Lab Test 08/26/24  1137 08/05/24  0825 07/15/24  0628    142 141   POTASSIUM 3.7 3.6 3.9   CHLORIDE 105 107 107   CO2 23 23 25   BUN 15.2 14.0 11.1   CR 0.91 0.92 0.95   ANIONGAP 13 12 9   BETHANY 8.9 8.9 8.9   * 158* 105*   PROTTOTAL 6.5 6.5 6.2*   ALBUMIN 4.0 4.1 4.0    Most Recent 3 LFT's:  Recent Labs   Lab Test 08/26/24  1137 08/05/24  0825 07/15/24  0628   AST 23 20 23   ALT 20 19 18   ALKPHOS 112 94 73   BILITOTAL 0.3 0.4 0.3       PSA Tumor Marker   Date Value Ref Range Status   08/26/2024 9.88 (H) 0.00 - 4.50 ng/mL Final   08/05/2024 7.12 (H) 0.00 - 4.50 ng/mL Final   07/15/2024 5.07 (H) 0.00 - 4.50 ng/mL Final   06/24/2024 3.51 0.00 - 4.50 ng/mL Final   06/03/2024 2.68 0.00 - 4.50 ng/mL Final   08/22/2022 1.36 0.00 - 4.00 ug/L Final   08/01/2022 1.78 0.00 - 4.00 ug/L Final   07/11/2022 2.16 0.00  "- 4.00 ug/L Final   05/05/2022 1.79 0.00 - 4.00 ug/L Final   03/22/2022 0.72 0.00 - 4.00 ug/L Final           ASSESSMENT & PLAN: Mr. Reyes is a 64 year old gentleman with metastatic castration sensitive prostate adenocarcinoma as well as an incidentally diagnosed stage III clear-cell renal cell carcinoma of the right kidney (s/p radical R nephrectomy 3/19/19).     1. Metastatic castration-resistant prostate cancer, with involvement of the bones and RPLN:   - Patient met the criteria for CHAARTED \"high-volume\" metastatic hormone-sensitive prostate cancer. He opted for docetaxel in combination with ADT (per CHAARTED, [id number], FELIPEEDE). He was subsequently treated with docetaxel x6 doses in the [facility] setting and enzalutamide.  He initiated Pluvicto in May 2023 with an initial slight decline in PSA, but this began to rise just prior to Cycle 3.  Given his XR evidence of progression in L femur and PSA rise, we re-evaluated his progression with PSMA PET scan in September 2023, with note of mixed response.  Canceled Pluvicto Dose for November due to symptomatic progression with PSA rise. PSA is labile. Biopsy results from 11/21 was unrevealing for evaluation of NEPC vs small cell given progression without a significant rise in PSA. Tempus peripheral blood mutation analysis is unrevealing for targetable mutations. Cabazitaxel started 11/27/23 given progression on Pluvicto. Carboplatin added 2/2024 and cabazitaxel dose reduced by 20% in April 2024 due to neuropathy.  Restaging with pulmonary consolidative findings without mass-like features.  Chromogranin A 271 on 7/15/24.   -Feeling more fatigued with more neuropathy. Discussed dose reduction but he would like to continue at current dose. Of note his PSA with slight increase again today, up to 7.12 ng/mL. Discussed with Dr. Mcnair, patient is clinically stable, will continue current regimen for now.   -Lupron every 3 months, next due 10/7/2024  -Cabazitaxel to " be continued at 20% dose reduction. ***     2. Pressure in chest, resolved:  Has an ongoing sensation of pressure/need to cough in his chest, primarily against his sternum. Vital signs stable, negative pulmonary physical exam. No chest pain.     3. Bone metastases:    - Noted to have osseous metastatic disease at the time of diagnosis. S/p radiation to C spine and sacrum (Re-irradiation to sacrum).   - pain mgmt per palliative care, pain is now well controlled with fentanyl patch. Dexamethasone burst is helpful for pain flares and he has these available as needed.    -Will increase Prednisone to 5-10mg daily (can increase to 10mg dose as needed for pain)  - RT to L femur 10/2023.  RT for bilateral posterior ribs 12/2023 with Dr. Albert.  - Encouraged to continue calcium and vitamin D supplementation; continue Zometa 4mg IV every ~3 months.   - Last Zometa given 5/20/24. Will give next dose today.      4. Stage 3, UISS-high risk ccRCC: s/p R nephrectomy   - Nephrectomy 3/19/19 and noted incidentally.  He is now 4.5 years post-op without evidence for recurrence.   - At this point, there is a minimal risk of recurrence of his RCC given the time since surgery without the use of adjuvant immunotherapy. There is no suspicious adenopathy or other features on his most recent imaging studies.    - Will continue to monitor with imaging planned for prostate cancer.      5. Sensory neuropathy, Toes/feet Grade 2  - continue monitor for worsening with cabazitaxel. Increased over the last few weeks, discussed dose reduction but Walter does not think it is severe enough to warrant a reduction.   - Continue acupuncture once weekly  - He is on gabapentin and duloxetine managed by palliative care   -duloxetine dose recently increased with further improvement in neuropathy. Tried weaning off gabapentin and pain in back and ribs was uncontrolled again. Continue duloxetine and gabapentin per palliative.      6. Anemia  - likely secondary  to chemotherapy. Iron levels adequate on 4/22/24. MCV elevated.  Folate and B12 adequate.  Retic appropriately elevated.      7. Left lower extremity swelling   - US 4/22/24 negative for DVT. CT abdomen pelvis 4/29 without significant or new lymphadenopathy. Improved lately with new compression garments. Continue compression stocking. Following with lymphedema therapy.    PLAN:   1. Proceed with C10 of carboplatin/cabazitaxel.  20% dose reduction of cabazi.  Consider dose reduction of carbo next if neuropathy is worse.    2. Increase Prednisone as needed to 5-10mg daily. He denies the need for increase of pain medications at this time.   3. Continue acupuncture, gabapentin and duloxetine for neuropathy.     4. Return in 3 weeks for C11    5. Restaging in 3 months with CT CAP and NM bone scan, scheduled end of September, early October.  Sooner if new pain or symptoms.              [facility] Oncology Followup  Sep 16, 2024    REASON FOR VISIT: Follow-up for metastatic castration-resistant prostate cancer.      INTERVAL HISTORY:   Walter is seen today accompanied by his wife.  - Doing okay.   - Neuropathy seems to be worse in the feet up to the ankles with pain, tingling, and numbness. Continues to have stable numbness in the fingertips. He is noticing changes with his balance due to the neuropathy but denies falls.  - He has loss weight.   - Endorses nausea the first 5 days, well controlled with scheduling compazine the first week. No vomiting.   - Endorses constipation the first week and then transitions to diarrhea daily x ~once per week. He has both stool softeners and imodium prn.    - He began ivermectin 12 mg daily since 9/5/24 for cancer treatment.   - He fevers or chills.  - He has dyspnea on exertion, stable from prior. No chest pain or cough.  - Energy is overall low since starting chemo and slowly decreasing. Typically feels low energy the first 4 days with gradual improvement.   - Endorses anorexia.  "  - Walter and his wife have a couple of trips coming up that they are excited about. They are going to the [facility] in a week and a half and [place].     Review of Systems:  Remainder of 12 point ROS reviewed and otherwise negative except as in interval history.       ONCOLOGY HISTORY: Mr. 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 His oncologic history is detailed below.     1. 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 Naylor at [facility]. Prior PSA were 1.4 on 8/16/17, 2.4 on 6/28/16, 2.9 on 6/28/16, and as low as 0.4 on 3/26/2003.   - 1/08/2019: Consultation with Sarah Wilson CNP in Urology clinic. Repeat PSA 9.6.  - 1/16/2019: MRI prostate with contrast - \"This examination is characterized as PIRADS 5- very high probability. Clinically significant cancer is highly likely to be present. There is a large, invasive mass arising from the right peripheral zone and extending into the neurovascular bundle, seminal vesicle, and along the anterolateral right mesorectal fascia. Metastatic right external iliac lymph node. Metastatic lesion in the posterior/superior right acetabulum.\"  - 1/25/2019: CT abdomen and pelvis with IV contrast - \"1. Heterogeneous enhancing mass posteriorly in the upper pole of the right kidney measures 4.5 x 5.8 x 5.7 cm (AP by transverse by craniocaudal). It has a small nodular component extending posterior medially which abuts the right psoas muscle. This nodular extension measures 2.1 x 2.1 cm. Minimal stranding about the mass. No definite thrombus within the right renal vein. This " "renal mass is compatible with a renal cell carcinoma. A paraaortic lymph node situated immediately posterior to the left renal vein measures 1.1 cm in short axis, suspicious for metastasis. 2. A 2 cm right external iliac lymph node is also suspicious for metastases. 3. Multiple sclerotic osseous lesions suspicious for metastases. These include 0.8 and 0.6 cm sclerotic lesions laterally in the right iliac wing (images 53 and 62 respectively). Ill-defined groundglass density laterally in the right acetabulum measuring 1.7 x 1.2 cm corresponds with the lesion identified on MRI. A 0.4 cm groundglass density in the left acetabulum. Sclerotic lesion in the left femoral neck measures 0.9 x 1.6 cm (image 81). Sclerotic metastases would be more compatible with prostate metastases. 4. A 3 subcentimeter hepatic lesions are indeterminate. Metastases would be a consideration. These can be further characterized with liver MRI.\"  - 1/25/2019: NM bone scan - \"There is focal bony uptake in the left femoral neck, right acetabulum, right of midline at the S1 or L5 level of the spine, within multiple bilateral ribs, in the C7 or T1 level of the spine, and anteriorly within the skull. Findings are suspicious for metastases.\"  - 1/31/19: CT chest with contrast - \" No suspicious nodules in the chest.  Stable appearance of a 5.8 cm right renal mass concerning for renal carcinoma until proven otherwise.  Stable indeterminant subcentimeter hypodensities in the liver.  Multifocal osteoblastic metastasis including 2.5 cm lesion in the right fifth rib posteriorly and a 1.6 cm lesion in the right third rib posteriorly. No lytic lesions identified.\"  - 2/6/19: CT-guided right sclerotic fifth rib lesion biopsy - \"Metastatic carcinoma, consistent with prostate primary.  Immunohistochemical stains performed show the metastatic carcinoma stains positive for NKX3.1 (prostate marker), negative for STACIE 3 and PAX8, which supports the above " "diagnosis.\"  - 2/15/19: Started Casodex 50mg every day and consented for the biobanking protocol. 3/18/19 - stopped Casodex.  - 2/19/19: Case discussed in tumor board - recommendation for nephectomy for suspected malignant right renal mass.   - 2/26/19: Started Lupron 22.5mg every 3 months.   - 5/8/19: Started Docetaxel 75mg/m2 IV every 3 weeks. 06/18/19 - cycle 3, 7/10/19 - cycle 4, 07/31/19 - cycle 5, 08/21/19 - cycle 6.   - 10/2/19: Restaging CT CAP and NM Bone Scan showed improved osseous disease, no evidence of recurrent or new metastatic disease.  - 1/5/20: Restaging CT CAP and NM Bone Scan showed stable osseous disease, no evidence of recurrent or new metastatic disease.  - 4/6/20: NM bone scan with slightly improved uptake in known skeletal mets. CT C/A/P with contrast with stable osseous mets, no yusuf enlargement, no new visceral mets etc.  - 04/08/20: Zometa every 3 months.  - 10/5/20: CT C/A/P with contrast and NM bone scan - SD.   - 1/4/21: CT C/A/P with contrast and NM bone scan - SD but slight increase in right 5th rib tracer uptake and in posterior L5 sclerosis. 1/6/21  PSA = 0.29  - 03/29/21: CT C/A/P with contrast - single new right sacral 8mm bone lesion (suspicious), other bone mets stable. No visceral/yusuf disease. Nephrectomy site without recurrence. NM bone scan - SD but \"increased uptake associated with the prior lesions of the lower cervical spine, posterior right fourth rib and right L5 posterior arch elements. Otherwise unchanged uptake associated with the proximal left femur and left anterior fourth rib. Similar uptake of the left halux, possibly secondary to degenerative osteoarthritis or gout.\"    3/31/21 PSA = 0.44  7/7/21  PSA = 0.47  11/2021 PSA = 1.05  -- Start XTANDI  12/16/21 PSA =0.22  2/11/22 PSA= 0.50  3/22/22 PSA=0.72  5/5/2022 PSA=1.79  7/11/2022 PSA=2.16 --Start cycle 1 docetaxel   8/22/22 PSA = 1.36  9/12/22 PSA = 1.09  10/24/22 Cycle #6 of Docetaxel. End of " treatment  1/9/23  PSA =0.66  3/20/23  PSA=1.54  4/21/23 PSA = 1.81  T=15  5/22/23 C1 Pluvicto   06/07/23          PSA = 1.42  7/18/23 PSA=1.75, C2 Pluvicto   8/28/23 Transfer of care initial visit for Tabby.  PSA 2.06.  C3 Pluvicto scheduled for 8/30.  Proceed with dose as planned.  MR L femur, ortho referral, PSMA PET ordered for prior to follow-up.    10/2/23 Palliative RT to L femur metastasis complete.  PSMA PET with mixed response.  PSA 2.21.  RT to L femur pending.  Continue with Dose #4 of Pluvicto despite mixed response to therapy.  Dex course for possible pain flare with RT.    11/8/23  Follow-up prior to Dose #5 Pluvicto.  More pain in chest/ribs/back. PSA 4.38.  Testosterone=3.  Rad onc referral for ribs.  Biopsy progressive lesion for progression on Pluvicto.  Cabazitaxel scheduled for follow-up appointment.  Tempus blood testing unrevealing for targetable mutation.    11/27/23 Bx completed from R iliac crest but unrevealing for either PCa or RCC.  Start cabazitaxel C1D1.  PSA 2.81.    12/19/23 PSA = 3.66  1/8/24 PSA = 3.55  1/29/24 PSA = 3.07  2/19/24 PSA= 2.21-Start carboplatin/cabazitaxel.    3/11/24 PSA= C2 Carbo/cabazi.  PSA 1.99.    4/01/24 PSA= 1.74. C3 Carbo/cabazi.   4/22/24 PSA= 1.85. C4 Carbo/cabazi.   5/13/24 PSA = 2.06. C5 Carbo/cabazi.   6/3/24 PSA = 2.68, cycle 6 Carboplatin/cabazitaxel  6/24/24 PSA 3.51, C7 carboplatin/cabazitaxel.  Bony lesions stable on NM bone scan.  Reticulonodular densities noted in lungs as possible infection vs drug reaction.  Add chromogranin A to next labs.   7/15/24 PSA= 5.07, chromogranin A pending, cycle 8  8/5/24 PSA= 7.12, Cycle 9 carboplatin/cabazitaxel  8/26/24 PSA= 9.88, cycle 10 carbo/cabazitaxel  9/16/24 PSA= 16.53, cycle 11 carbo/cabazitaxel. Dose reduce carboplatin from AUC 4 to AUC 3 due to fatigue, anorexia, and peripheral neuropathy.     Radiation history:   -C7         3,000 cGy       06 X      8/05/2021        8/18/2021        13       10  -2  "Sacrum          2,500 cGy       18 X      4/12/2022        4/18/2022         6           -Sacrum retreat               700 cGy        18 X      2/28/2023        2/28/2023  -Left femur to 2000 cGy in 5 fractions, completed 10/10/2023   -bilateral posterior chest wall at an area of osseous involvement of the ribs and adjacent T4 and 5 spine, to be delivered to 2000 cGy in 5 fractions.  completed 12/13-12/19/23.    2. Stage III ([id number]), grade 3 of 4, clear-cell RCC of right kidney:  - Incidentally diagnosed as above.   - Underwent curative-intent robotic right radical nephrectomy with Dr. Wesley Cleveland on 3/19/19. No tumor spillage per op report.   - Path showed: \"Histologic Type: Clear cell renal cell carcinoma; Sarcomatoid Features: Not identified; Histologic Grade: Nucleolar grade 3 (WHO/ISUP). Extent Tumor Size: 4.3 cm. Microscopic Tumor Extension: Tumor extension into renal sinus (in vascular structures). Margins: Negative. Tumor Necrosis: Present; focal. Lymph-Vascular Invasion: Not identified.  Pathologic Staging (pTNM) Primary Tumor (pT): pT3a: Tumor extends into renal vein branches/renal sinus. Regional Lymph Nodes (pN): pNX. Number of Lymph Nodes Examined: 0 Distant Metastasis (pM): pM N/A.\"  - Restaging scans as above.  No current concerns for recurrence.      PAST MEDICAL HISTORY:  Past Medical History:   Diagnosis Date     Cancer of kidney, right (H)      Complication of anesthesia     slow wakeup      Malignant neoplasm of prostate metastatic to bone (H) 2/14/2019     Thyroid disease      CURRENT MEDICATIONS:   Current Outpatient Medications:      atorvastatin (LIPITOR) 80 MG tablet, Take 80 mg by mouth daily, Disp: , Rfl:      BERBERINE CHLORIDE PO, , Disp: , Rfl:      calcium citrate-vitamin D (CITRACAL) 315-250 MG-UNIT TABS per tablet, Take 500 mg by mouth 2 times daily With 800 Vd per pt, Disp: , Rfl:      cycloSPORINE (RESTASIS) 0.05 % ophthalmic emulsion, Place 1 drop into both eyes 2 times " daily , Disp: , Rfl:      dexAMETHasone (DECADRON) 4 MG tablet, Take 2 tablets (8 mg) by mouth daily Take for 3 days, starting the day after chemo. Take with food., Disp: 6 tablet, Rfl: 2     dexAMETHasone (DECADRON) 4 MG tablet, Take 1 tablet (4 mg) by mouth daily, Disp: 7 tablet, Rfl: 2     DULoxetine (CYMBALTA) 60 MG capsule, Take 1 capsule (60 mg) by mouth daily., Disp: 90 capsule, Rfl: 1     fentaNYL (DURAGESIC) 25 mcg/hr 72 hr patch, Place 1 patch onto the skin every 72 hours. remove old patch., Disp: 10 patch, Rfl: 0     furosemide (LASIX) 20 MG tablet, Take 1 tablet (20 mg) by mouth daily, Disp: 15 tablet, Rfl: 1     gabapentin (NEURONTIN) 300 MG capsule, Take 1 capsule (300 mg) by mouth every morning AND 2 capsules (600 mg) daily at 2 pm AND 2 capsules (600 mg) at bedtime., Disp: 150 capsule, Rfl: 3     levothyroxine (SYNTHROID/LEVOTHROID) 112 MCG tablet, Take 112 mcg by mouth daily, Disp: , Rfl:      lidocaine, viscous, (XYLOCAINE) 2 % solution, Swish and swallow 15 mLs in mouth every 3 hours as needed for pain (before meals and at bedtime) ; Max 8 doses/24 hour period., Disp: 100 mL, Rfl: 1     loratadine (CLARITIN) 10 MG tablet, Take 10 mg by mouth daily, Disp: , Rfl:      Multiple Vitamins-Minerals (MULTIVITAMIN ADULT EXTRA C PO), Take 1 tablet by mouth every 24 hours, Disp: , Rfl:      naloxone (NARCAN) 4 MG/0.1ML nasal spray, Spray 1 spray (4 mg) into one nostril alternating nostrils as needed for opioid reversal every 2-3 minutes until assistance arrives, Disp: 0.2 mL, Rfl: 1     Nutritional Supplements (SALMON OIL) CAPS, Take 2 capsules by mouth daily , Disp: , Rfl:      omeprazole (PRILOSEC) 20 MG DR capsule, Take 20 mg by mouth daily, Disp: , Rfl:      ondansetron (ZOFRAN) 8 MG tablet, Take 1 tablet (8 mg) by mouth every 8 hours as needed for nausea (vomiting), Disp: 30 tablet, Rfl: 2     ondansetron (ZOFRAN) 8 MG tablet, Take 1 tablet (8 mg) by mouth every 8 hours as needed for nausea, Disp: 30  tablet, Rfl: 4     oxyCODONE (ROXICODONE) 5 MG tablet, Take 1-2 tablets (5-10 mg) by mouth every 3 hours as needed for severe pain, Disp: 90 tablet, Rfl: 0     predniSONE (DELTASONE) 5 MG tablet, Take 1-2 tablets (5-10 mg) by mouth 2 times daily (with meals) for 21 days., Disp: 84 tablet, Rfl: 0     prochlorperazine (COMPAZINE) 10 MG tablet, Take 1 tablet (10 mg) by mouth every 6 hours as needed for nausea or vomiting, Disp: 30 tablet, Rfl: 5     tamsulosin (FLOMAX) 0.4 MG capsule, Take 1 capsule (0.4 mg) by mouth daily, Disp: 30 capsule, Rfl: 3     triamterene-HCTZ (MAXZIDE-25) 37.5-25 MG tablet, Take 1 tablet by mouth daily, Disp: , Rfl:       Physical Exam:   /69 (BP Location: Left arm, Patient Position: Sitting, Cuff Size: Adult Large)   Pulse 86   Temp 97.7  F (36.5  C) (Oral)   Resp 18   Wt 109.5 kg (241 lb 6.4 oz)   SpO2 96%   BMI 32.74 kg/m    Wt Readings from Last 10 Encounters:   08/26/24 113.1 kg (249 lb 6.4 oz)   08/05/24 112 kg (247 lb)   07/24/24 113.4 kg (250 lb)   07/15/24 113.9 kg (251 lb)   07/03/24 114.8 kg (253 lb)   06/24/24 114.7 kg (252 lb 14.4 oz)   06/03/24 116 kg (255 lb 12.8 oz)   05/13/24 117.9 kg (259 lb 14.4 oz)   04/22/24 118.3 kg (260 lb 14.4 oz)   04/01/24 119.4 kg (263 lb 3.2 oz)   General: The patient is a pleasant male in no acute distress.  HEENT: EOMI. Sclerae are anicteric. Mucous membranes moist without lesions or thrush.   Lymph: Neck is supple with no lymphadenopathy in the cervical or supraclavicular areas.   Heart: Regular rate and rhythm.   Lungs: normal work of breathing on RA, clear to auscultation throughout   Abdomen: Bowel sounds present, soft, non-distended.    Extremities: trace bilateral lower extremity edema, nonpitting edema from thigh through lower leg in left leg    Neuro: Cranial nerves II through XII are grossly intact.  Skin: No rashes, petechiae, or bruising noted on exposed skin.  Psych: affect bright, alert and oriented    LABORATORY  "DATA:  Most Recent 3 CBC's:  Recent Labs   Lab Test 08/26/24  1137 08/05/24  0825 07/15/24  0628   WBC 4.8 4.6 3.7*   HGB 10.1* 10.4* 9.3*   * 107* 107*   * 134* 147*   ANEUTAUTO 3.4 3.3 2.6     Most Recent 3 BMP's:  Recent Labs   Lab Test 08/26/24  1137 08/05/24  0825 07/15/24  0628    142 141   POTASSIUM 3.7 3.6 3.9   CHLORIDE 105 107 107   CO2 23 23 25   BUN 15.2 14.0 11.1   CR 0.91 0.92 0.95   ANIONGAP 13 12 9   BETHANY 8.9 8.9 8.9   * 158* 105*   PROTTOTAL 6.5 6.5 6.2*   ALBUMIN 4.0 4.1 4.0    Most Recent 3 LFT's:  Recent Labs   Lab Test 08/26/24 1137 08/05/24  0825 07/15/24  0628   AST 23 20 23   ALT 20 19 18   ALKPHOS 112 94 73   BILITOTAL 0.3 0.4 0.3        PSA Tumor Marker   Latest Ref Rng 0.00 - 4.50 ng/mL   3/11/2024  8:49 AM 1.99    4/1/2024  9:30 AM 1.74    4/22/2024  9:09 AM 1.85    5/13/2024  9:16 AM 2.06    6/3/2024  7:52 AM 2.68    6/24/2024  7:54 AM 3.51    7/15/2024  6:28 AM 5.07 (H)    8/5/2024  8:25 AM 7.12 (H)    8/26/2024  11:37 AM 9.88 (H)    9/16/2024  11:10 AM 16.53 (H)           ASSESSMENT & PLAN: Mr. Reyes is a 64 year old gentleman with metastatic castration sensitive prostate adenocarcinoma as well as an incidentally diagnosed stage III clear-cell renal cell carcinoma of the right kidney (s/p radical R nephrectomy 3/19/19).     1. Metastatic castration-resistant prostate cancer, with involvement of the bones and RPLN:   - Patient met the criteria for CHAARTED \"high-volume\" metastatic hormone-sensitive prostate cancer. He opted for docetaxel in combination with ADT (per JOSEFA, [id number], KARLA). He was subsequently treated with docetaxel x6 doses in the [facility] setting and enzalutamide.  He initiated Pluvicto in May 2023 with an initial slight decline in PSA, but this began to rise just prior to Cycle 3.  Given his XR evidence of progression in L femur and PSA rise, we re-evaluated his progression with PSMA PET scan in September 2023, with note of " mixed response.  Canceled Pluvicto Dose for November due to symptomatic progression with PSA rise. PSA is labile. Biopsy results from 11/21 was unrevealing for evaluation of NEPC vs small cell given progression without a significant rise in PSA. Tempus peripheral blood mutation analysis is unrevealing for targetable mutations. Cabazitaxel started 11/27/23 given progression on Pluvicto. Carboplatin added 2/2024 and cabazitaxel dose reduced by 20% in April 2024 due to neuropathy.  Restaging with pulmonary consolidative findings without mass-like features.  Chromogranin A 271 on 7/15/24.   -Feeling more fatigued with more neuropathy and weight loss. Of note his PSA with slight increase again today, up to 16.53 ng/mL. Discussed with Dr. Mcnair, patient is clinically stable, will continue current regimen for now. He is due for restaging in early October. He may be a candidate for ODM-211 or radium.    -Lupron every 3 months, next due 10/7/2024  -Cabazitaxel to be continued at 20% dose reduction. Will furthermore reduce carbplatin to an AUC of 3 due to fatigue, anorexia, and worsening peripheral neuropathy.      2. Pressure in chest, resolved:  Has an ongoing sensation of pressure/need to cough in his chest, primarily against his sternum. Vital signs stable, negative pulmonary physical exam. No chest pain.     3. Bone metastases:    - Noted to have osseous metastatic disease at the time of diagnosis. S/p radiation to C spine and sacrum (Re-irradiation to sacrum).   - pain mgmt per palliative care, pain is now well controlled with fentanyl patch. Dexamethasone burst is helpful for pain flares and he has these available as needed.    -Will increase Prednisone to 5-10mg daily (can increase to 10mg dose as needed for pain)  - RT to L femur 10/2023.  RT for bilateral posterior ribs 12/2023 with Dr. Albert.  - Encouraged to continue calcium and vitamin D supplementation; continue Zometa 4mg IV every ~3 months.   - Last Zometa  given 8/26/2024. Next due 11/2024.      4. Stage 3, UISS-high risk ccRCC: s/p R nephrectomy   - Nephrectomy 3/19/19 and noted incidentally.  He is now 4.5 years post-op without evidence for recurrence.   - At this point, there is a minimal risk of recurrence of his RCC given the time since surgery without the use of adjuvant immunotherapy. There is no suspicious adenopathy or other features on his most recent imaging studies.    - Will continue to monitor with imaging planned for prostate cancer.      5. Sensory neuropathy, Toes/feet Grade 2  - continue monitor for worsening with cabazitaxel.   - will dose reduced carboplatin.  - Continue acupuncture once weekly, has decreased to every 3 weeks but finds benefit and plans to increase back to weekly after travels.   - He is on gabapentin and duloxetine managed by palliative care   -duloxetine dose recently increased with further improvement in neuropathy. Tried weaning off gabapentin and pain in back and ribs was uncontrolled again. Continue duloxetine and gabapentin per palliative.      6. Anemia  - likely secondary to chemotherapy. Iron levels adequate on 4/22/24. MCV elevated.  Folate and B12 adequate.  Retic appropriately elevated.  Stable 9/16/24.     7. Left lower extremity swelling   - US 4/22/24 negative for DVT. CT abdomen pelvis 4/29 without significant or new lymphadenopathy. Improved lately with new compression garments. Continue compression stocking. Following with lymphedema therapy.    PLAN:   1. Proceed with C11 of carboplatin/cabazitaxel.  20% dose reduction of cabazi and change carboplatin to AUC 3.    2. Continue Prednisone as needed to 5-10mg daily. He denies the need for increase of pain medications at this time.   3. Continue acupuncture, gabapentin and duloxetine for neuropathy.     4. Return in 3 weeks for C12 with Dr. Mcnair.    5. Restaging in 3 months with CT CAP and NM bone scan, scheduled end of September, early October.  Sooner if new pain  or symptoms.            Again, thank you for allowing me to participate in the care of your patient.        Sincerely,        Yamilet Espinoza PA-C

## 2024-09-16 NOTE — NURSING NOTE
Oncology Rooming Note    September 16, 2024 11:40 AM   Walter Reyes is a 64 year old male who presents for:    Chief Complaint   Patient presents with    Port Draw     Labs drawn via port by RN in lab. VS taken.     Oncology Clinic Visit     RTN Prostate ca     Initial Vitals: /69 (BP Location: Left arm, Patient Position: Sitting, Cuff Size: Adult Large)   Pulse 86   Temp 97.7  F (36.5  C) (Oral)   Resp 18   Wt 109.5 kg (241 lb 6.4 oz)   SpO2 96%   BMI 32.74 kg/m   Estimated body mass index is 32.74 kg/m  as calculated from the following:    Height as of 7/24/24: 1.829 m (6').    Weight as of this encounter: 109.5 kg (241 lb 6.4 oz). Body surface area is 2.36 meters squared.  Mild Pain (2) Comment: Data Unavailable   No LMP for male patient.  Allergies reviewed: Yes  Medications reviewed: Yes    Medications: Medication refills not needed today.  Pharmacy name entered into EPIC:    [facility] [place], [place] - [address] [facility] - [place], [place] - [address] [facility] [place], [place] - [phone]  [facility] [place], [place] - [address] [address]    Frailty Screening:   Is the patient here for a new oncology consult visit in cancer care? 2. No      Clinical concerns: none      Aaron Gamez

## 2024-09-16 NOTE — PROGRESS NOTES
Infusion Nursing Note:  Walter Reyes presents today for Cycle 11, Day 1- Cabazitaxel and Carboplatin infusions with Neulasta On Pro.    Patient seen by provider today: Yes: KADEN Pereira   present during visit today: Not Applicable.    Note: Patient reports feeling at baseline on arrival to infusion suite today. Denies the following signs of infection: no fever, cough, chills, rash, chest pain, shortness of breath, diarrhea, or urinary symptoms. No new questions or concerns following TAHMINA appointment. Wishes to proceed with treatment today.     Neulasta Onpro On-Body injector applied to left lower abdomen at 1510.  Writer discussed Neulasta injection would start tomorrow 9/17/24 at 1810, approximately 27 hours after application applied today.    Written and Verbal instruction reviewed with patient.  Pt instructed when the dose delivery starts, it will take about 45 minutes to complete.  Pt aware Neulasta Onpro On-Body should have green flashing light and to call triage or on-call MD if injector flashes red or appears to be leaking.   Pt aware to keep Onpro On-Body Neulasta 4 inches away from electrical equipment and to avoid showering 4 hours prior to injection.   Neulasta Onpro Lot number: see MAR comment    Intravenous Access:  Implanted Port.    Treatment Conditions:  Lab Results   Component Value Date    HGB 10.2 (L) 09/16/2024    WBC 9.6 09/16/2024    ANEU 5.0 07/07/2021    ANEUTAUTO 7.6 09/16/2024     (L) 09/16/2024        Lab Results   Component Value Date     09/16/2024    POTASSIUM 3.8 09/16/2024    MAG 1.9 08/25/2023    CR 1.07 09/16/2024    BETHANY 8.6 (L) 09/16/2024    BILITOTAL 0.3 09/16/2024    ALBUMIN 3.8 09/16/2024    ALT 24 09/16/2024    AST 26 09/16/2024       Results reviewed, labs MET treatment parameters, ok to proceed with treatment.      Post Infusion Assessment:  Patient tolerated infusion without incident.  Blood return noted pre and post infusion.  Site  patent and intact, free from redness, edema or discomfort.  No evidence of extravasations.  Access discontinued per protocol.       Discharge Plan:   Prescription refills given for Prednisone.  Discharge instructions reviewed with: Patient and Family.  Patient and/or family verbalized understanding of discharge instructions and all questions answered.  AVS to patient via [employer].  Patient will return 10/7/24 for next appointment.   Patient discharged in stable condition accompanied by: wife.  Departure Mode: Ambulatory.      Jhoana Cao RN

## 2024-09-16 NOTE — PATIENT INSTRUCTIONS
[facility] Triage and after hours / weekends / holidays:  [phone] option #5, then #2     Please call the triage or after hours line if you experience a temperature greater than or equal to 100.4, shaking chills, have uncontrolled nausea, vomiting and/or diarrhea, dizziness, shortness of breath, chest pain, bleeding, unexplained bruising, or if you have any other new/concerning symptoms, questions or concerns.      If you are having any concerning symptoms or wish to speak to a provider before your next infusion visit, please call triage to notify them so we can adequately serve you.     If you need a refill on a narcotic prescription or other medication, please call before your infusion appointment.       You received an antinausea medication called Aloxi prior to your treatment today which lasts in your body for 72 hours. Aloxi is in the same family as one of your take home medications, Ondanestron (Zofran). Because they are in the same family if you feel nauseated over the next 3 days take Compazine (prochlorperazine). Taking Zofran will not help with nausea in the first three days after treatment because the Aloxi in your system is already working on that pathway.

## 2024-09-18 LAB — TESTOST SERPL-MCNC: <2 NG/DL (ref 240–950)

## 2024-09-23 ENCOUNTER — VIRTUAL VISIT (OUTPATIENT)
Dept: PALLIATIVE MEDICINE | Facility: CLINIC | Age: 64
End: 2024-09-23
Attending: NURSE PRACTITIONER
Payer: COMMERCIAL

## 2024-09-23 VITALS
HEIGHT: 70 IN | DIASTOLIC BLOOD PRESSURE: 81 MMHG | WEIGHT: 240 LBS | BODY MASS INDEX: 34.36 KG/M2 | SYSTOLIC BLOOD PRESSURE: 107 MMHG | HEART RATE: 79 BPM

## 2024-09-23 DIAGNOSIS — C80.1 NEUROPATHY ASSOCIATED WITH CANCER (H): ICD-10-CM

## 2024-09-23 DIAGNOSIS — G63 NEUROPATHY ASSOCIATED WITH CANCER (H): ICD-10-CM

## 2024-09-23 DIAGNOSIS — G89.3 CANCER ASSOCIATED PAIN: ICD-10-CM

## 2024-09-23 DIAGNOSIS — C61 PROSTATE CANCER (H): ICD-10-CM

## 2024-09-23 PROCEDURE — 99213 OFFICE O/P EST LOW 20 MIN: CPT | Mod: 95 | Performed by: NURSE PRACTITIONER

## 2024-09-23 ASSESSMENT — PAIN SCALES - GENERAL: PAINLEVEL: NO PAIN (0)

## 2024-09-23 NOTE — NURSING NOTE
Current patient location: [id number] [facility] [address]    Is the patient currently in the state of [place]? YES    Visit mode:VIDEO    If the visit is dropped, the patient can be reconnected by: VIDEO VISIT: Text to cell phone:   Telephone Information:   Mobile [phone]       Will anyone else be joining the visit? NO  (If patient encounters technical issues they should call [phone]    How would you like to obtain your AVS? MyChart    Are changes needed to the allergy or medication list? Pt stated no changes to allergies and Pt stated no med changes    Are refills needed on medications prescribed by this physician? NO    Rooming Documentation:  Questionnaire(s) completed    Reason for visit: RECHSHELBY KAY

## 2024-09-23 NOTE — PATIENT INSTRUCTIONS
Thank you for meeting with us in the [facility].    How to get a hold of us:  For non-urgent matters, MyChart works best.    For more urgent matters, or if you prefer not to use MyChart, call our clinic nurse coordinator Sherry Palma RN at [phone] or [phone]    We have an on-call number for evenings and weekends. Please call this only if you are having uncontrolled symptoms or serious side effects from your medicines: [phone].     For refills, please give us a week (5 working days) notice. We don't always have providers available everyday to do refills. If you call the day you run out of your medicine, we may not be able to refill it in time, so call 5 days in advance!

## 2024-09-23 NOTE — PROGRESS NOTES
Virtual Visit Details    Type of service:  Video Visit   Video start: 1055  Video end: 11:10  Originating Location (pt. Location): Home    Distant Location (provider location):  On-site  Platform used for Video Visit: [facility]        Palliative Care Outpatient Clinic      Patient ID/Chief Complaint: Walter Reyes 63 year old male who is presenting to the palliative medicine clinic today at the request of Yamilet Espinoza PA-C for a palliative care follow-up secondary to metastatic prostate cancer.   The patient's primary care provider is:  Poncho Ortiz       Impression & Recommendations:  63-year-old male with metastatic prostate cancer with spinal and bone metastases.  Palliative care assistance requested for symptom management of chronic cancer associated back pain with radiculopathy in the legs.    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  High risk for recurrence.    Past medical history also includes hyperlipidemia, hypothyroidism, HTN, GERD, Acosta's neuroma, plantar fasciitis, peroneal tendinitis, and mild chemotherapy-induced polyneuropathy.    Radiculopathy in both legs, has improved on gabapentin.  He has constant pain over the sacrum and to the left of the sacrum.  Pain is constant, deep, aching.  Interferes with sleep. Pain worsens when reclining or lying down. Due to progressive cancer, he is also having more rib and back pain. He is not seeking RTX at this time, rather opting for medication management.     Chemotherapy-induced polyneuropathy with Cymbalta and recent small reduction in chemotherapy.    Symptoms/recommendations/discussion:  Healthcare directive present in EMR. Wife Micheline is agent if needed.   Continue fentanyl patch 25 mcg/h.   Continue gabapentin 300 mg every morning, 600 mg afternoon, 600 mg at bedtime.  Recent increase in gabapentin due to chemotherapy-induced polyneuropathy. Cymbalta to 60 mg daily  "for neuropathy and mood.  Receiving acupuncture as well.  Continue oxycodone IR 5-10 mg every 4 hours as needed--- generally takes only before bed.  Narcan nasal spray ordered  Social situation includes residing in home with wife Micheline. Stable housing and food. No h/o substance abuse.   Palliative follow-up in 3 months. He has direct phone # for our team.         How to get a hold of us:  For non-urgent matters, MyChart works best.    For more urgent matters, or if you prefer not to use MyChart, call our clinic nurse coordinator Sherry Palma RN at [phone] or [phone]    We have an on-call number for evenings and weekends. Please call this only if you are having uncontrolled symptoms or serious side effects from your medicines: [phone].     For refills, please give us a week (5 working days) notice. We don't always have providers available everyday to do refills. If you call the day you run out of your medicine, we may not be able to refill it in time, so call 5 days in advance        History:  History gathered today from: patient, family/loved ones, medical chart, outside records including Care Everywhere      PE: /81   Pulse 79   Ht 1.765 m (5' 9.5\")   Wt 108.9 kg (240 lb)   BMI 34.93 kg/m     Wt Readings from Last 3 Encounters:   09/23/24 108.9 kg (240 lb)   09/16/24 109.5 kg (241 lb 6.4 oz)   08/26/24 113.1 kg (249 lb 6.4 oz)       Gen alert, comfortable appearing, NAD.   Head NCAT.  Eyes anicteric without injection  Face symmetric, eyes conjugate  Lungs unlabored, no cough, speaking full sentences  Skin no rashes or lesions evident on face/neck  Neuro Face symmetric, eyes conjugate; speech fluent.  Neuropsych exam normal including affect, sensorium, gross memory, thought processes, and fund of knowledge.         Data reviewed:  I reviewed electrolytes, BUN/creatinine, liver profile, hemoglobin and hematocrit, platelet count, and most recent imaging  Recent oncology notes     database " reviewed: 9/22      25 minutes spent on the date of the encounter doing chart review, history and exam, patient education & counseling, documentation and other activities as noted above.        Thank you for involving us in the patient's care.   LATESHA Seaman, [facility]

## 2024-09-30 ENCOUNTER — HOSPITAL ENCOUNTER (OUTPATIENT)
Dept: CT IMAGING | Facility: CLINIC | Age: 64
Discharge: HOME | End: 2024-09-30
Attending: STUDENT IN AN ORGANIZED HEALTH CARE EDUCATION/TRAINING PROGRAM | Admitting: STUDENT IN AN ORGANIZED HEALTH CARE EDUCATION/TRAINING PROGRAM
Payer: COMMERCIAL

## 2024-09-30 ENCOUNTER — HOSPITAL ENCOUNTER (OUTPATIENT)
Dept: NUCLEAR MEDICINE | Facility: CLINIC | Age: 64
Setting detail: NUCLEAR MEDICINE
Discharge: HOME | End: 2024-09-30
Attending: STUDENT IN AN ORGANIZED HEALTH CARE EDUCATION/TRAINING PROGRAM
Payer: COMMERCIAL

## 2024-09-30 DIAGNOSIS — C79.51 MALIGNANT NEOPLASM OF PROSTATE METASTATIC TO BONE (H): ICD-10-CM

## 2024-09-30 DIAGNOSIS — C61 MALIGNANT NEOPLASM OF PROSTATE METASTATIC TO BONE (H): ICD-10-CM

## 2024-09-30 PROCEDURE — 250N000009 HC RX 250: Performed by: STUDENT IN AN ORGANIZED HEALTH CARE EDUCATION/TRAINING PROGRAM

## 2024-09-30 PROCEDURE — 78306 BONE IMAGING WHOLE BODY: CPT | Mod: 26 | Performed by: RADIOLOGY

## 2024-09-30 PROCEDURE — 343N000001 HC RX 343 MED OP 636: Performed by: STUDENT IN AN ORGANIZED HEALTH CARE EDUCATION/TRAINING PROGRAM

## 2024-09-30 PROCEDURE — 78306 BONE IMAGING WHOLE BODY: CPT

## 2024-09-30 PROCEDURE — 74177 CT ABD & PELVIS W/CONTRAST: CPT | Mod: 26 | Performed by: RADIOLOGY

## 2024-09-30 PROCEDURE — 250N000011 HC RX IP 250 OP 636: Performed by: STUDENT IN AN ORGANIZED HEALTH CARE EDUCATION/TRAINING PROGRAM

## 2024-09-30 PROCEDURE — A9503 TC99M MEDRONATE: HCPCS | Performed by: STUDENT IN AN ORGANIZED HEALTH CARE EDUCATION/TRAINING PROGRAM

## 2024-09-30 PROCEDURE — 343N000001 HC RX 343: Performed by: STUDENT IN AN ORGANIZED HEALTH CARE EDUCATION/TRAINING PROGRAM

## 2024-09-30 PROCEDURE — 74177 CT ABD & PELVIS W/CONTRAST: CPT

## 2024-09-30 PROCEDURE — 71260 CT THORAX DX C+: CPT | Mod: 26 | Performed by: RADIOLOGY

## 2024-09-30 RX ORDER — TC 99M MEDRONATE 20 MG/10ML
25 INJECTION, POWDER, LYOPHILIZED, FOR SOLUTION INTRAVENOUS ONCE
Status: COMPLETED | OUTPATIENT
Start: 2024-09-30 | End: 2024-09-30

## 2024-09-30 RX ORDER — IOPAMIDOL 755 MG/ML
135 INJECTION, SOLUTION INTRAVASCULAR ONCE
Status: COMPLETED | OUTPATIENT
Start: 2024-09-30 | End: 2024-09-30

## 2024-09-30 RX ADMIN — IOPAMIDOL 135 ML: 755 INJECTION, SOLUTION INTRAVENOUS at 08:32

## 2024-09-30 RX ADMIN — TC 99M MEDRONATE 20.4 MILLICURIE: 20 INJECTION, POWDER, LYOPHILIZED, FOR SOLUTION INTRAVENOUS at 08:01

## 2024-09-30 RX ADMIN — SODIUM CHLORIDE, PRESERVATIVE FREE 84 ML: 5 INJECTION INTRAVENOUS at 08:33

## 2024-10-01 DIAGNOSIS — C61 PROSTATE CANCER (H): ICD-10-CM

## 2024-10-01 RX ORDER — OXYCODONE HYDROCHLORIDE 5 MG/1
5-10 TABLET ORAL
Qty: 90 TABLET | Refills: 0 | Status: SHIPPED | OUTPATIENT
Start: 2024-10-01

## 2024-10-01 NOTE — TELEPHONE ENCOUNTER
Received [employer] message from patient requesting refill of oxycodone.     Last refill: 7/3/24  Last office visit: 9/23/24  Scheduled for follow up 1/2/25     Will route request to NP for review.     Reviewed [place]  Report.

## 2024-10-06 NOTE — PROGRESS NOTES
"    [facility] Oncology Followup  Oct 7, 2024    REASON FOR VISIT: Follow-up for metastatic castration-resistant prostate cancer.      INTERVAL HISTORY:   Walter is seen today accompanied by his wife.  Overall feeling well.  Feels pretty wiped out for first several days after carbo/cabazi.  Week 2 is usually Ok.  Week 3 is usually closer to his baseline.  Feeling weak in thighs and with muscle mass loss in general.  Trying to back off on prednisone a bit from 10mg twice a day on weeks 2/3.  Appetite is good.  No significant pain.  Low back aches, but has for years.  Edema of L thigh has improved and hasn't needed compression for this in a few weeks, but he knows he can use intermittently if needed.  Neuropathy feels a bit better with reduced dose of carboplatin to AUC of 3.  Acupuncture has been helping. He has seen PT and has exercises to do to help maintain strength.      Review of Systems:  Remainder of 12 point ROS reviewed and otherwise negative except as in interval history.       ONCOLOGY HISTORY: Mr. 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 His oncologic history is detailed below.     1. 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 Naylor at [facility]. Prior PSA were 1.4 on 8/16/17, 2.4 on 6/28/16, 2.9 on 6/28/16, and as low as 0.4 on 3/26/2003.   - 1/08/2019: Consultation with Sarah Wilson CNP in Urology clinic. Repeat PSA 9.6.  - 1/16/2019: MRI prostate with contrast - \"This examination is characterized as PIRADS 5- very high probability. Clinically significant cancer is highly likely to be present. There is " "a large, invasive mass arising from the right peripheral zone and extending into the neurovascular bundle, seminal vesicle, and along the anterolateral right mesorectal fascia. Metastatic right external iliac lymph node. Metastatic lesion in the posterior/superior right acetabulum.\"  - 1/25/2019: CT abdomen and pelvis with IV contrast - \"1. Heterogeneous enhancing mass posteriorly in the upper pole of the right kidney measures 4.5 x 5.8 x 5.7 cm (AP by transverse by craniocaudal). It has a small nodular component extending posterior medially which abuts the right psoas muscle. This nodular extension measures 2.1 x 2.1 cm. Minimal stranding about the mass. No definite thrombus within the right renal vein. This renal mass is compatible with a renal cell carcinoma. A paraaortic lymph node situated immediately posterior to the left renal vein measures 1.1 cm in short axis, suspicious for metastasis. 2. A 2 cm right external iliac lymph node is also suspicious for metastases. 3. Multiple sclerotic osseous lesions suspicious for metastases. These include 0.8 and 0.6 cm sclerotic lesions laterally in the right iliac wing (images 53 and 62 respectively). Ill-defined groundglass density laterally in the right acetabulum measuring 1.7 x 1.2 cm corresponds with the lesion identified on MRI. A 0.4 cm groundglass density in the left acetabulum. Sclerotic lesion in the left femoral neck measures 0.9 x 1.6 cm (image 81). Sclerotic metastases would be more compatible with prostate metastases. 4. A 3 subcentimeter hepatic lesions are indeterminate. Metastases would be a consideration. These can be further characterized with liver MRI.\"  - 1/25/2019: NM bone scan - \"There is focal bony uptake in the left femoral neck, right acetabulum, right of midline at the S1 or L5 level of the spine, within multiple bilateral ribs, in the C7 or T1 level of the spine, and anteriorly within the skull. Findings are suspicious for metastases.\"  - " "1/31/19: CT chest with contrast - \" No suspicious nodules in the chest.  Stable appearance of a 5.8 cm right renal mass concerning for renal carcinoma until proven otherwise.  Stable indeterminant subcentimeter hypodensities in the liver.  Multifocal osteoblastic metastasis including 2.5 cm lesion in the right fifth rib posteriorly and a 1.6 cm lesion in the right third rib posteriorly. No lytic lesions identified.\"  - 2/6/19: CT-guided right sclerotic fifth rib lesion biopsy - \"Metastatic carcinoma, consistent with prostate primary.  Immunohistochemical stains performed show the metastatic carcinoma stains positive for NKX3.1 (prostate marker), negative for STACIE 3 and PAX8, which supports the above diagnosis.\"  - 2/15/19: Started Casodex 50mg every day and consented for the biobanking protocol. 3/18/19 - stopped Casodex.  - 2/19/19: Case discussed in tumor board - recommendation for nephectomy for suspected malignant right renal mass.   - 2/26/19: Started Lupron 22.5mg every 3 months.   - 5/8/19: Started Docetaxel 75mg/m2 IV every 3 weeks. 06/18/19 - cycle 3, 7/10/19 - cycle 4, 07/31/19 - cycle 5, 08/21/19 - cycle 6.   - 10/2/19: Restaging CT CAP and NM Bone Scan showed improved osseous disease, no evidence of recurrent or new metastatic disease.  - 1/5/20: Restaging CT CAP and NM Bone Scan showed stable osseous disease, no evidence of recurrent or new metastatic disease.  - 4/6/20: NM bone scan with slightly improved uptake in known skeletal mets. CT C/A/P with contrast with stable osseous mets, no yusuf enlargement, no new visceral mets etc.  - 04/08/20: Zometa every 3 months.  - 10/5/20: CT C/A/P with contrast and NM bone scan - SD.   - 1/4/21: CT C/A/P with contrast and NM bone scan - SD but slight increase in right 5th rib tracer uptake and in posterior L5 sclerosis. 1/6/21  PSA = 0.29  - 03/29/21: CT C/A/P with contrast - single new right sacral 8mm bone lesion (suspicious), other bone mets stable. No " "visceral/yusuf disease. Nephrectomy site without recurrence. NM bone scan - SD but \"increased uptake associated with the prior lesions of the lower cervical spine, posterior right fourth rib and right L5 posterior arch elements. Otherwise unchanged uptake associated with the proximal left femur and left anterior fourth rib. Similar uptake of the left halux, possibly secondary to degenerative osteoarthritis or gout.\"    3/31/21 PSA = 0.44  7/7/21  PSA = 0.47  11/2021 PSA = 1.05  -- Start XTANDI  12/16/21 PSA =0.22  2/11/22 PSA= 0.50  3/22/22 PSA=0.72  5/5/2022 PSA=1.79  7/11/2022 PSA=2.16 --Start cycle 1 docetaxel   8/22/22 PSA = 1.36  9/12/22 PSA = 1.09  10/24/22 Cycle #6 of Docetaxel. End of treatment  1/9/23  PSA =0.66  3/20/23  PSA=1.54  4/21/23 PSA = 1.81  T=15  5/22/23 C1 Pluvicto   06/07/23          PSA = 1.42  7/18/23 PSA=1.75, C2 Pluvicto   8/28/23 Transfer of care initial visit for Tabby.  PSA 2.06.  C3 Pluvicto scheduled for 8/30.  Proceed with dose as planned.  MR DELORIS femur, ortho referral, PSMA PET ordered for prior to follow-up.    10/2/23 Palliative RT to L femur metastasis complete.  PSMA PET with mixed response.  PSA 2.21.  RT to L femur pending.  Continue with Dose #4 of Pluvicto despite mixed response to therapy.  Dex course for possible pain flare with RT.    11/8/23  Follow-up prior to Dose #5 Pluvicto.  More pain in chest/ribs/back. PSA 4.38.  Testosterone=3.  Rad onc referral for ribs.  Biopsy progressive lesion for progression on Pluvicto.  Cabazitaxel scheduled for follow-up appointment.  Tempus blood testing unrevealing for targetable mutation.    11/27/23 Bx completed from R iliac crest but unrevealing for either PCa or RCC.  Start cabazitaxel C1D1.  PSA 2.81.    12/19/23 PSA = 3.66  1/8/24 PSA = 3.55  1/29/24 PSA = 3.07  2/19/24 PSA= 2.21-Start carboplatin/cabazitaxel.    3/11/24 PSA= C2 Carbo/cabazi.  PSA 1.99.    4/01/24 PSA= 1.74. C3 Carbo/cabazi.   4/22/24 PSA= 1.85. C4 Carbo/cabazi. " "  5/13/24 PSA = 2.06. C5 Carbo/cabazi.   6/3/24 PSA = 2.68, cycle 6 Carboplatin/cabazitaxel  6/24/24 PSA 3.51, C7 carboplatin/cabazitaxel.  Bony lesions stable on NM bone scan.  Reticulonodular densities noted in lungs as possible infection vs drug reaction.  Add chromogranin A to next labs.   7/15/24 PSA= 5.07, chromogranin A pending, cycle 8  8/5/24 PSA= 7.12, Cycle 9 carboplatin/cabazitaxel  8/26/24 PSA= 9.88, cycle 10 carbo/cabazitaxel  9/16/24 PSA= 16.53, cycle 11 carbo/cabazitaxel. Dose reduce carboplatin from AUC 4 to AUC 3 due to fatigue, anorexia, and peripheral neuropathy.   10/7/24 PSA= 19.  Cycle 12 carbo/cabazitaxel.  Restaging imaging overall stable with exception of increasing parietal bone activity compared to prior. Dose not given due to IV fluid shortage based on [facility] leadership decision.  We are working on getting him rescheduled.      Radiation history:   -C7         3,000 cGy       06 X      8/05/2021        8/18/2021        13       10  -2 Sacrum          2,500 cGy       18 X      4/12/2022        4/18/2022         6           -Sacrum retreat               700 cGy        18 X      2/28/2023        2/28/2023  -Left femur to 2000 cGy in 5 fractions, completed 10/10/2023   -bilateral posterior chest wall at an area of osseous involvement of the ribs and adjacent T4 and 5 spine, to be delivered to 2000 cGy in 5 fractions.  completed 12/13-12/19/23.    2. Stage III ([id number]), grade 3 of 4, clear-cell RCC of right kidney:  - Incidentally diagnosed as above.   - Underwent curative-intent robotic right radical nephrectomy with Dr. Wesley Cleveland on 3/19/19. No tumor spillage per op report.   - Path showed: \"Histologic Type: Clear cell renal cell carcinoma; Sarcomatoid Features: Not identified; Histologic Grade: Nucleolar grade 3 (WHO/ISUP). Extent Tumor Size: 4.3 cm. Microscopic Tumor Extension: Tumor extension into renal sinus (in vascular structures). Margins: Negative. Tumor Necrosis: Present; " "focal. Lymph-Vascular Invasion: Not identified.  Pathologic Staging (pTNM) Primary Tumor (pT): pT3a: Tumor extends into renal vein branches/renal sinus. Regional Lymph Nodes (pN): pNX. Number of Lymph Nodes Examined: 0 Distant Metastasis (pM): pM N/A.\"  - Restaging scans as above.  No current concerns for recurrence.      PAST MEDICAL HISTORY:  Past Medical History:   Diagnosis Date    Cancer of kidney, right (H)     Complication of anesthesia     slow wakeup     Malignant neoplasm of prostate metastatic to bone (H) 2/14/2019    Thyroid disease      CURRENT MEDICATIONS:   Current Outpatient Medications:     atorvastatin (LIPITOR) 80 MG tablet, Take 80 mg by mouth daily, Disp: , Rfl:     BERBERINE CHLORIDE PO, , Disp: , Rfl:     calcium citrate-vitamin D (CITRACAL) 315-250 MG-UNIT TABS per tablet, Take 500 mg by mouth 2 times daily With 800 Vd per pt, Disp: , Rfl:     cycloSPORINE (RESTASIS) 0.05 % ophthalmic emulsion, Place 1 drop into both eyes 2 times daily , Disp: , Rfl:     dexAMETHasone (DECADRON) 4 MG tablet, Take 2 tablets (8 mg) by mouth daily Take for 3 days, starting the day after chemo. Take with food., Disp: 6 tablet, Rfl: 2    DULoxetine (CYMBALTA) 60 MG capsule, Take 1 capsule (60 mg) by mouth daily., Disp: 90 capsule, Rfl: 1    fentaNYL (DURAGESIC) 25 mcg/hr 72 hr patch, Place 1 patch onto the skin every 72 hours. remove old patch., Disp: 10 patch, Rfl: 0    furosemide (LASIX) 20 MG tablet, Take 1 tablet (20 mg) by mouth daily (Patient not taking: Reported on 9/16/2024), Disp: 15 tablet, Rfl: 1    gabapentin (NEURONTIN) 300 MG capsule, Take 1 capsule (300 mg) by mouth every morning AND 2 capsules (600 mg) daily at 2 pm AND 2 capsules (600 mg) at bedtime., Disp: 150 capsule, Rfl: 3    ivermectin (STROMECTOL) 3 MG TABS tablet, , Disp: , Rfl:     levothyroxine (SYNTHROID/LEVOTHROID) 112 MCG tablet, Take 112 mcg by mouth daily, Disp: , Rfl:     loratadine (CLARITIN) 10 MG tablet, Take 10 mg by mouth " daily, Disp: , Rfl:     Multiple Vitamins-Minerals (MULTIVITAMIN ADULT EXTRA C PO), Take 1 tablet by mouth every 24 hours, Disp: , Rfl:     naloxone (NARCAN) 4 MG/0.1ML nasal spray, Spray 1 spray (4 mg) into one nostril alternating nostrils as needed for opioid reversal every 2-3 minutes until assistance arrives, Disp: 0.2 mL, Rfl: 1    Nutritional Supplements (SALMON OIL) CAPS, Take 2 capsules by mouth daily , Disp: , Rfl:     omeprazole (PRILOSEC) 20 MG DR capsule, Take 20 mg by mouth daily, Disp: , Rfl:     ondansetron (ZOFRAN) 8 MG tablet, Take 1 tablet (8 mg) by mouth every 8 hours as needed for nausea (vomiting), Disp: 30 tablet, Rfl: 2    oxyCODONE (ROXICODONE) 5 MG tablet, Take 1-2 tablets (5-10 mg) by mouth every 3 hours as needed for severe pain., Disp: 90 tablet, Rfl: 0    predniSONE (DELTASONE) 5 MG tablet, Take 1-2 tablets (5-10 mg) by mouth 2 times daily (with meals) for 21 days., Disp: 84 tablet, Rfl: 0    predniSONE (DELTASONE) 5 MG tablet, Take 1-2 tablets (5-10 mg) by mouth 2 times daily (with meals) for 21 days., Disp: 84 tablet, Rfl: 0    prochlorperazine (COMPAZINE) 10 MG tablet, Take 1 tablet (10 mg) by mouth every 6 hours as needed for nausea or vomiting, Disp: 30 tablet, Rfl: 5    tamsulosin (FLOMAX) 0.4 MG capsule, Take 1 capsule (0.4 mg) by mouth daily, Disp: 30 capsule, Rfl: 3  No current facility-administered medications for this visit.      Physical Exam:   /75   Pulse 64   Temp 98.1  F (36.7  C) (Oral)   Resp 18   Wt 109.2 kg (240 lb 11.2 oz)   SpO2 97%   BMI 35.04 kg/m    Wt Readings from Last 10 Encounters:   10/07/24 109.2 kg (240 lb 11.2 oz)   09/23/24 108.9 kg (240 lb)   09/16/24 109.5 kg (241 lb 6.4 oz)   08/26/24 113.1 kg (249 lb 6.4 oz)   08/05/24 112 kg (247 lb)   07/24/24 113.4 kg (250 lb)   07/15/24 113.9 kg (251 lb)   07/03/24 114.8 kg (253 lb)   06/24/24 114.7 kg (252 lb 14.4 oz)   06/03/24 116 kg (255 lb 12.8 oz)   General: The patient is a pleasant male in no  "acute distress.  HEENT: EOMI. Sclerae are anicteric. Mucous membranes moist.    Lymph: Neck is supple. No gross adenopathy.   Heart: No JVD or edema.   Lungs: normal work of breathing on RA. No audible wheezing or rhonchi.   Abdomen:  non-distended.    Extremities: trace bilateral lower extremity edema, nonpitting edema from thigh through lower leg in left leg    Neuro: Cranial nerves II through XII are grossly intact.  Skin: No rashes, petechiae, or bruising noted on exposed skin.  Psych: affect bright, alert and oriented    LABORATORY DATA:  Most Recent 3 CBC's:  Recent Labs   Lab Test 10/07/24  0815 09/16/24  1110 08/26/24  1137   WBC 7.5 9.6 4.8   HGB 10.2* 10.2* 10.1*   * 108* 107*   * 141* 124*   ANEUTAUTO 5.9 7.6 3.4     Most Recent 3 BMP's:  Recent Labs   Lab Test 10/07/24  0815 09/16/24  1110 08/26/24  1137    142 141   POTASSIUM 4.1 3.8 3.7   CHLORIDE 103 107 105   CO2 25 22 23   BUN 15.0 17.0 15.2   CR 0.88 1.07 0.91   ANIONGAP 11 13 13   BETHANY 9.0 8.6* 8.9   * 114* 123*   PROTTOTAL 6.5 6.3* 6.5   ALBUMIN 4.0 3.8 4.0    Most Recent 3 LFT's:  Recent Labs   Lab Test 10/07/24  0815 09/16/24  1110 08/26/24  1137   AST 26 26 23   ALT 28 24 20   ALKPHOS 119 112 112   BILITOTAL 0.3 0.3 0.3     PSA trend, see oncology history.      ASSESSMENT & PLAN: Mr. Reyes is a 64 year old gentleman with metastatic castration sensitive prostate adenocarcinoma as well as an incidentally diagnosed stage III clear-cell renal cell carcinoma of the right kidney (s/p radical R nephrectomy 3/19/19).     1. Metastatic castration-resistant prostate cancer, with involvement of the bones and RPLN:   - Patient met the criteria for CHAARTED \"high-volume\" metastatic hormone-sensitive prostate cancer. He opted for docetaxel in combination with ADT (per JOSEFA, SHAVONNE-[id number], KARLA). He was subsequently treated with docetaxel x6 doses in the [facility] setting and enzalutamide.  He initiated Pluvicto in May " 2023 with an initial slight decline in PSA, but this began to rise just prior to Cycle 3.  Given his XR evidence of progression in L femur and PSA rise, we re-evaluated his progression with PSMA PET scan in September 2023, with note of mixed response.  Canceled Pluvicto Dose for November due to symptomatic progression with PSA rise. PSA is labile. Biopsy results from 11/21 was unrevealing for evaluation of NEPC vs small cell given progression without a significant rise in PSA. Tempus peripheral blood mutation analysis is unrevealing for targetable mutations. Cabazitaxel started 11/27/23 given progression on Pluvicto. Carboplatin added 2/2024 and cabazitaxel dose reduced by 20% in April 2024 due to neuropathy.  Restaging with pulmonary consolidative findings without mass-like features, which had improved on October 2024 restaging imaging. If neuropathy remains intolerable, we could consider addition of Ra-223 and enzalutamide per [facility]ACE-3 results, although this was for a first-line CRPC patient subset without prior ART therapy.  Notably, he has not received ART therapy in more than 3 years at the time of our October 2024 visit.  This does require continued administration of antiresorptive therapy.    -Lupron every 3 months, next due 12/30/24.    -Cabazitaxel to be continued at 20% dose reduction. Further reduced carboplatin to an AUC of 3 due to fatigue, anorexia, and worsening peripheral neuropathy in September 2024.  Unfortunately, 10/7/24 C12D1 infusion was held due to lack of IV fluids from [place].  We are actively working on getting him rescheduled to continue active therapy.   -At our 10/2024 restaging visit, we discussed continuing carbo/cabazitaxel, which we all agreed to continue.    -He is on 10mg BID of prednisone currently.  He is going to attempt to do this dose for the week after chemo infusion then reduced back to 5mg BID.      2. Pressure in chest, resolved:  Has an ongoing sensation of  pressure/need to cough in his chest, primarily against his sternum. Vital signs stable, negative pulmonary physical exam. No chest pain.     3. Bone metastases:    - Noted to have osseous metastatic disease at the time of diagnosis. S/p radiation to C spine and sacrum (Re-irradiation to sacrum).   - pain mgmt per palliative care, pain is now well controlled with fentanyl patch. Dexamethasone burst is helpful for pain flares and he has these available as needed.    -Will increase Prednisone to 5-10mg daily (can increase to 10mg dose as needed for pain)  - RT to L femur 10/2023.  RT for bilateral posterior ribs 12/2023 with Dr. Albert.  - Encouraged to continue calcium and vitamin D supplementation; continue Zometa 4mg IV every ~3 months.   - Last Zometa given 8/26/2024. Next due 11/2024.      4. Stage 3, UISS-high risk ccRCC: s/p R nephrectomy   - Nephrectomy 3/19/19 and noted incidentally.  He is now 5+ years post-op without evidence for recurrence.   - At this point, there is a minimal risk of recurrence of his RCC given the time since surgery without the use of adjuvant immunotherapy. There is no suspicious adenopathy or other features on his most recent imaging studies.    - Will continue to monitor with imaging planned for prostate cancer.      5. Sensory neuropathy, Toes/feet Grade 2  - continue monitor for worsening with cabazitaxel.   - Carboplatin dose reduced to AUC of 3 in September of 2024.    - Continue acupuncture once weekly, has decreased to every 3 weeks but finds benefit and plans to increase back to weekly after travels.   - He is on gabapentin and duloxetine managed by palliative care   -duloxetine dose recently increased with further improvement in neuropathy. Tried weaning off gabapentin and pain in back and ribs was uncontrolled again. Continue duloxetine and gabapentin per palliative.      6. Anemia  - likely secondary to chemotherapy. Iron levels adequate on 4/22/24. MCV elevated.  Folate and  B12 adequate.  Retic appropriately elevated.  Stable 9/16/24.     7. Left lower extremity swelling   - US 4/22/24 negative for DVT. CT abdomen pelvis 4/29 without significant or new lymphadenopathy. Improved lately with new compression garments. Continue compression stocking. Following with lymphedema therapy.    PLAN:   1. We are working on rescheduling this cycle of chemotherapy.  The doctors and APPs found out your chemo was being held by [facility] after you found out, so this was a complete surprise to us as well.    2. We will get you scheduled for your next cycles once we get a new time for your chemo.   3. I'm OK with you continuing the Ivermectin.  Please watch for side effects while on this medication.   4. See me back at the end of December with scans the week prior.    5. Zometa infusion as scheduled in [place].      A total of 40 minutes spent on the date of the encounter doing chart review, review of test results, interpretation of tests, patient visit, and documentation.  The patient was given the opportunity to ask multiple questions today, all of which were answered to their satisfaction.    The longitudinal plan of care for metastatic hormone resistant prostate cancer was addressed during this visit. Due to the added complexity in care, I will continue to support Walter Reyes in the subsequent management of this condition(s) and with the ongoing continuity of care of this condition(s).      Omar Mcnair MD, PhD   of Medicine   Oncology/BMT/Cellular Therapies

## 2024-10-07 ENCOUNTER — APPOINTMENT (OUTPATIENT)
Dept: LAB | Facility: CLINIC | Age: 64
End: 2024-10-07
Attending: STUDENT IN AN ORGANIZED HEALTH CARE EDUCATION/TRAINING PROGRAM
Payer: COMMERCIAL

## 2024-10-07 ENCOUNTER — ONCOLOGY VISIT (OUTPATIENT)
Dept: ONCOLOGY | Facility: CLINIC | Age: 64
End: 2024-10-07
Attending: STUDENT IN AN ORGANIZED HEALTH CARE EDUCATION/TRAINING PROGRAM
Payer: COMMERCIAL

## 2024-10-07 VITALS
DIASTOLIC BLOOD PRESSURE: 75 MMHG | RESPIRATION RATE: 18 BRPM | TEMPERATURE: 98.1 F | BODY MASS INDEX: 35.04 KG/M2 | WEIGHT: 240.7 LBS | OXYGEN SATURATION: 97 % | HEART RATE: 64 BPM | SYSTOLIC BLOOD PRESSURE: 128 MMHG

## 2024-10-07 DIAGNOSIS — G62.0 CHEMOTHERAPY-INDUCED PERIPHERAL NEUROPATHY (H): ICD-10-CM

## 2024-10-07 DIAGNOSIS — Z76.89 PREVENTION OF CHEMOTHERAPY-INDUCED NEUTROPENIA: ICD-10-CM

## 2024-10-07 DIAGNOSIS — D69.59 CHEMOTHERAPY-INDUCED THROMBOCYTOPENIA: ICD-10-CM

## 2024-10-07 DIAGNOSIS — C61 PROSTATE CANCER (H): ICD-10-CM

## 2024-10-07 DIAGNOSIS — D64.81 ANTINEOPLASTIC CHEMOTHERAPY INDUCED ANEMIA: ICD-10-CM

## 2024-10-07 DIAGNOSIS — T45.1X5A ANTINEOPLASTIC CHEMOTHERAPY INDUCED ANEMIA: ICD-10-CM

## 2024-10-07 DIAGNOSIS — G89.3 CANCER ASSOCIATED PAIN: ICD-10-CM

## 2024-10-07 DIAGNOSIS — C61 MALIGNANT NEOPLASM OF PROSTATE METASTATIC TO BONE (H): Primary | ICD-10-CM

## 2024-10-07 DIAGNOSIS — T45.1X5A CHEMOTHERAPY-INDUCED PERIPHERAL NEUROPATHY (H): ICD-10-CM

## 2024-10-07 DIAGNOSIS — T45.1X5A CHEMOTHERAPY-INDUCED THROMBOCYTOPENIA: ICD-10-CM

## 2024-10-07 DIAGNOSIS — C79.51 MALIGNANT NEOPLASM OF PROSTATE METASTATIC TO BONE (H): Primary | ICD-10-CM

## 2024-10-07 LAB
ALBUMIN SERPL BCG-MCNC: 4 G/DL (ref 3.5–5.2)
ALP SERPL-CCNC: 119 U/L (ref 40–150)
ALT SERPL W P-5'-P-CCNC: 28 U/L (ref 0–70)
ANION GAP SERPL CALCULATED.3IONS-SCNC: 11 MMOL/L (ref 7–15)
AST SERPL W P-5'-P-CCNC: 26 U/L (ref 0–45)
BASOPHILS # BLD AUTO: 0 10E3/UL (ref 0–0.2)
BASOPHILS NFR BLD AUTO: 0 %
BILIRUB SERPL-MCNC: 0.3 MG/DL
BUN SERPL-MCNC: 15 MG/DL (ref 8–23)
CALCIUM SERPL-MCNC: 9 MG/DL (ref 8.8–10.4)
CHLORIDE SERPL-SCNC: 103 MMOL/L (ref 98–107)
CREAT SERPL-MCNC: 0.88 MG/DL (ref 0.67–1.17)
EGFRCR SERPLBLD CKD-EPI 2021: >90 ML/MIN/1.73M2
EOSINOPHIL # BLD AUTO: 0 10E3/UL (ref 0–0.7)
EOSINOPHIL NFR BLD AUTO: 0 %
ERYTHROCYTE [DISTWIDTH] IN BLOOD BY AUTOMATED COUNT: 18 % (ref 10–15)
GLUCOSE SERPL-MCNC: 105 MG/DL (ref 70–99)
HCO3 SERPL-SCNC: 25 MMOL/L (ref 22–29)
HCT VFR BLD AUTO: 31.5 % (ref 40–53)
HGB BLD-MCNC: 10.2 G/DL (ref 13.3–17.7)
IMM GRANULOCYTES # BLD: 0.1 10E3/UL
IMM GRANULOCYTES NFR BLD: 1 %
LYMPHOCYTES # BLD AUTO: 0.9 10E3/UL (ref 0.8–5.3)
LYMPHOCYTES NFR BLD AUTO: 12 %
MCH RBC QN AUTO: 34.9 PG (ref 26.5–33)
MCHC RBC AUTO-ENTMCNC: 32.4 G/DL (ref 31.5–36.5)
MCV RBC AUTO: 108 FL (ref 78–100)
MONOCYTES # BLD AUTO: 0.7 10E3/UL (ref 0–1.3)
MONOCYTES NFR BLD AUTO: 9 %
NEUTROPHILS # BLD AUTO: 5.9 10E3/UL (ref 1.6–8.3)
NEUTROPHILS NFR BLD AUTO: 79 %
NRBC # BLD AUTO: 0 10E3/UL
NRBC BLD AUTO-RTO: 0 /100
PLATELET # BLD AUTO: 129 10E3/UL (ref 150–450)
POTASSIUM SERPL-SCNC: 4.1 MMOL/L (ref 3.4–5.3)
PROT SERPL-MCNC: 6.5 G/DL (ref 6.4–8.3)
PSA SERPL DL<=0.01 NG/ML-MCNC: 19.2 NG/ML (ref 0–4.5)
RBC # BLD AUTO: 2.92 10E6/UL (ref 4.4–5.9)
SODIUM SERPL-SCNC: 139 MMOL/L (ref 135–145)
WBC # BLD AUTO: 7.5 10E3/UL (ref 4–11)

## 2024-10-07 PROCEDURE — 36591 DRAW BLOOD OFF VENOUS DEVICE: CPT

## 2024-10-07 PROCEDURE — 85025 COMPLETE CBC W/AUTO DIFF WBC: CPT | Performed by: STUDENT IN AN ORGANIZED HEALTH CARE EDUCATION/TRAINING PROGRAM

## 2024-10-07 PROCEDURE — 99213 OFFICE O/P EST LOW 20 MIN: CPT | Performed by: STUDENT IN AN ORGANIZED HEALTH CARE EDUCATION/TRAINING PROGRAM

## 2024-10-07 PROCEDURE — 84153 ASSAY OF PSA TOTAL: CPT | Performed by: STUDENT IN AN ORGANIZED HEALTH CARE EDUCATION/TRAINING PROGRAM

## 2024-10-07 PROCEDURE — G2211 COMPLEX E/M VISIT ADD ON: HCPCS | Performed by: STUDENT IN AN ORGANIZED HEALTH CARE EDUCATION/TRAINING PROGRAM

## 2024-10-07 PROCEDURE — 99215 OFFICE O/P EST HI 40 MIN: CPT | Performed by: STUDENT IN AN ORGANIZED HEALTH CARE EDUCATION/TRAINING PROGRAM

## 2024-10-07 PROCEDURE — 96402 CHEMO HORMON ANTINEOPL SQ/IM: CPT

## 2024-10-07 PROCEDURE — 250N000011 HC RX IP 250 OP 636: Mod: JZ | Performed by: STUDENT IN AN ORGANIZED HEALTH CARE EDUCATION/TRAINING PROGRAM

## 2024-10-07 PROCEDURE — 250N000011 HC RX IP 250 OP 636: Performed by: STUDENT IN AN ORGANIZED HEALTH CARE EDUCATION/TRAINING PROGRAM

## 2024-10-07 PROCEDURE — 82310 ASSAY OF CALCIUM: CPT | Performed by: STUDENT IN AN ORGANIZED HEALTH CARE EDUCATION/TRAINING PROGRAM

## 2024-10-07 PROCEDURE — 36591 DRAW BLOOD OFF VENOUS DEVICE: CPT | Performed by: STUDENT IN AN ORGANIZED HEALTH CARE EDUCATION/TRAINING PROGRAM

## 2024-10-07 PROCEDURE — 84403 ASSAY OF TOTAL TESTOSTERONE: CPT | Performed by: STUDENT IN AN ORGANIZED HEALTH CARE EDUCATION/TRAINING PROGRAM

## 2024-10-07 RX ORDER — PALONOSETRON 0.05 MG/ML
0.25 INJECTION, SOLUTION INTRAVENOUS ONCE
Status: CANCELLED | OUTPATIENT
Start: 2024-10-08

## 2024-10-07 RX ORDER — DIPHENHYDRAMINE HYDROCHLORIDE 50 MG/ML
50 INJECTION INTRAMUSCULAR; INTRAVENOUS
Status: CANCELLED
Start: 2024-10-08

## 2024-10-07 RX ORDER — ALBUTEROL SULFATE 90 UG/1
1-2 INHALANT RESPIRATORY (INHALATION)
Status: CANCELLED
Start: 2024-10-08

## 2024-10-07 RX ORDER — HEPARIN SODIUM,PORCINE 10 UNIT/ML
5 VIAL (ML) INTRAVENOUS
OUTPATIENT
Start: 2024-10-07

## 2024-10-07 RX ORDER — ALBUTEROL SULFATE 0.83 MG/ML
2.5 SOLUTION RESPIRATORY (INHALATION)
Status: CANCELLED | OUTPATIENT
Start: 2024-10-08

## 2024-10-07 RX ORDER — EPINEPHRINE 1 MG/ML
0.3 INJECTION, SOLUTION INTRAMUSCULAR; SUBCUTANEOUS EVERY 5 MIN PRN
Status: CANCELLED | OUTPATIENT
Start: 2024-10-08

## 2024-10-07 RX ORDER — METHYLPREDNISOLONE SODIUM SUCCINATE 125 MG/2ML
125 INJECTION INTRAMUSCULAR; INTRAVENOUS
Status: CANCELLED
Start: 2024-10-08

## 2024-10-07 RX ORDER — HEPARIN SODIUM (PORCINE) LOCK FLUSH IV SOLN 100 UNIT/ML 100 UNIT/ML
5 SOLUTION INTRAVENOUS
OUTPATIENT
Start: 2024-10-07

## 2024-10-07 RX ORDER — HEPARIN SODIUM (PORCINE) LOCK FLUSH IV SOLN 100 UNIT/ML 100 UNIT/ML
5 SOLUTION INTRAVENOUS ONCE
Status: COMPLETED | OUTPATIENT
Start: 2024-10-07 | End: 2024-10-07

## 2024-10-07 RX ORDER — LORAZEPAM 2 MG/ML
0.5 INJECTION INTRAMUSCULAR EVERY 4 HOURS PRN
Status: CANCELLED | OUTPATIENT
Start: 2024-10-08

## 2024-10-07 RX ORDER — HEPARIN SODIUM,PORCINE 10 UNIT/ML
5-20 VIAL (ML) INTRAVENOUS DAILY PRN
Status: CANCELLED | OUTPATIENT
Start: 2024-10-08

## 2024-10-07 RX ORDER — MEPERIDINE HYDROCHLORIDE 25 MG/ML
25 INJECTION INTRAMUSCULAR; INTRAVENOUS; SUBCUTANEOUS EVERY 30 MIN PRN
Status: CANCELLED | OUTPATIENT
Start: 2024-10-08

## 2024-10-07 RX ORDER — HEPARIN SODIUM (PORCINE) LOCK FLUSH IV SOLN 100 UNIT/ML 100 UNIT/ML
5 SOLUTION INTRAVENOUS
Status: CANCELLED | OUTPATIENT
Start: 2024-10-08

## 2024-10-07 RX ORDER — FENTANYL 25 UG/1
1 PATCH TRANSDERMAL
Qty: 10 PATCH | Refills: 0 | Status: SHIPPED | OUTPATIENT
Start: 2024-10-07 | End: 2024-10-18

## 2024-10-07 RX ADMIN — LEUPROLIDE ACETATE 22.5 MG: KIT at 09:49

## 2024-10-07 RX ADMIN — Medication 5 ML: at 08:15

## 2024-10-07 ASSESSMENT — PAIN SCALES - GENERAL: PAINLEVEL: NO PAIN (1)

## 2024-10-07 NOTE — NURSING NOTE
Chief Complaint   Patient presents with    Port Draw     Labs drawn via port access by lab RN       Port blood draw with heparin flush by lab RN. Vitals taken and appointment arrived.    Gricel Moran RN

## 2024-10-07 NOTE — NURSING NOTE
"Oncology Rooming Note    October 7, 2024 8:35 AM   Walter Reyes is a 64 year old male who presents for:    Chief Complaint   Patient presents with    Port Draw     Labs drawn via port access by lab RN    Oncology Clinic Visit     Metastatic Prostate Cancer     Initial Vitals: /75   Pulse 64   Temp 98.1  F (36.7  C) (Oral)   Resp 18   Wt 109.2 kg (240 lb 11.2 oz)   SpO2 97%   BMI 35.04 kg/m   Estimated body mass index is 35.04 kg/m  as calculated from the following:    Height as of 9/23/24: 1.765 m (5' 9.5\").    Weight as of this encounter: 109.2 kg (240 lb 11.2 oz). Body surface area is 2.31 meters squared.  No Pain (1) Comment: Data Unavailable   No LMP for male patient.  Allergies reviewed: Yes  Medications reviewed: Yes    Medications: Medication refills not needed today.  Pharmacy name entered into EPIC:    [facility] [place], [place] - [address] [facility] - [place], [place] - [address] [place] [address] - [address] [facility] [phone]  [facility] [place], [place] - [address] [facility]    Frailty Screening:   Is the patient here for a new oncology consult visit in cancer care? 2. No      Clinical concerns: None       Meena See LPN  10/7/2024              "

## 2024-10-07 NOTE — PROGRESS NOTES
Infusion Nursing Note:  Walter Reyes presents today for C12D1 Jevtana and Carboplatin (NOT GIVEN) and Lupron.    Patient seen by provider today: Yes: Dr. Mcnair   present during visit today: Not Applicable.    Note: Walter presents to infusion today following his clinic visit.    Due to nationwide IVF shortage, unable to give patient his Jevtana and Carboplatin today. Message sent to care team to notify them and assist with rescheduling patient as soon as possible. Patient and wife state understanding.    Lupron still able to be given in infusion today.       Intravenous Access:  Implanted Port.    Treatment Conditions:  Lab Results   Component Value Date    HGB 10.2 (L) 10/07/2024    WBC 7.5 10/07/2024    ANEU 5.0 07/07/2021    ANEUTAUTO 5.9 10/07/2024     (L) 10/07/2024     Lab Results   Component Value Date     10/07/2024    POTASSIUM 4.1 10/07/2024    MAG 1.9 08/25/2023    CR 0.88 10/07/2024    BETHANY 9.0 10/07/2024    BILITOTAL 0.3 10/07/2024    ALBUMIN 4.0 10/07/2024    ALT 28 10/07/2024    AST 26 10/07/2024     Post Infusion Assessment:  Patient tolerated injection to R ventrogluteal muscle without incident.  Access discontinued per protocol.     Discharge Plan:   Discharge instructions reviewed with: Patient and Family.  Patient and/or family verbalized understanding of discharge instructions and all questions answered.  AVS to patient via [employer].  Patient will return 10/28 for next appointment.   Patient discharged in stable condition accompanied by: wife.  Departure Mode: Ambulatory.      Consuelo Kuhn RN

## 2024-10-07 NOTE — LETTER
10/7/2024      Walter Reyes  [id number] [place] [id number]      Dear Colleague,    Thank you for referring your patient, Walter Reyes, to the [facility]. Please see a copy of my visit note below.        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      INTERVAL HISTORY:   Walter is seen today accompanied by his wife.  Overall feeling well.  Feels pretty wiped out for first several days after carbo/cabazi.  Week 2 is usually Ok.  Week 3 is usually closer to his baseline.  Feeling weak in thighs and with muscle mass loss in general.  Trying to back off on prednisone a bit from 10mg twice a day on weeks 2/3.  Appetite is good.  No significant pain.  Low back aches, but has for years.  Edema of L thigh has improved and hasn't needed compression for this in a few weeks, but he knows he can use intermittently if needed.  Neuropathy feels a bit better with reduced dose of carboplatin to AUC of 3.  Acupuncture has been helping. He has seen PT and has exercises to do to help maintain strength.      Review of Systems:  Remainder of 12 point ROS reviewed and otherwise negative except as in interval history.       ONCOLOGY HISTORY: Mr. 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 His oncologic history is detailed below.     1. 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 Naylor at [facility]. Prior PSA were 1.4 on 8/16/17, 2.4 on 6/28/16, 2.9 on 6/28/16, and as low as 0.4 on 3/26/2003.   -  "1/08/2019: Consultation with Sarah Wilson CNP in Urology clinic. Repeat PSA 9.6.  - 1/16/2019: MRI prostate with contrast - \"This examination is characterized as PIRADS 5- very high probability. Clinically significant cancer is highly likely to be present. There is a large, invasive mass arising from the right peripheral zone and extending into the neurovascular bundle, seminal vesicle, and along the anterolateral right mesorectal fascia. Metastatic right external iliac lymph node. Metastatic lesion in the posterior/superior right acetabulum.\"  - 1/25/2019: CT abdomen and pelvis with IV contrast - \"1. Heterogeneous enhancing mass posteriorly in the upper pole of the right kidney measures 4.5 x 5.8 x 5.7 cm (AP by transverse by craniocaudal). It has a small nodular component extending posterior medially which abuts the right psoas muscle. This nodular extension measures 2.1 x 2.1 cm. Minimal stranding about the mass. No definite thrombus within the right renal vein. This renal mass is compatible with a renal cell carcinoma. A paraaortic lymph node situated immediately posterior to the left renal vein measures 1.1 cm in short axis, suspicious for metastasis. 2. A 2 cm right external iliac lymph node is also suspicious for metastases. 3. Multiple sclerotic osseous lesions suspicious for metastases. These include 0.8 and 0.6 cm sclerotic lesions laterally in the right iliac wing (images 53 and 62 respectively). Ill-defined groundglass density laterally in the right acetabulum measuring 1.7 x 1.2 cm corresponds with the lesion identified on MRI. A 0.4 cm groundglass density in the left acetabulum. Sclerotic lesion in the left femoral neck measures 0.9 x 1.6 cm (image 81). Sclerotic metastases would be more compatible with prostate metastases. 4. A 3 subcentimeter hepatic lesions are indeterminate. Metastases would be a consideration. These can be further characterized with liver MRI.\"  - 1/25/2019: NM bone scan - " "\"There is focal bony uptake in the left femoral neck, right acetabulum, right of midline at the S1 or L5 level of the spine, within multiple bilateral ribs, in the C7 or T1 level of the spine, and anteriorly within the skull. Findings are suspicious for metastases.\"  - 1/31/19: CT chest with contrast - \" No suspicious nodules in the chest.  Stable appearance of a 5.8 cm right renal mass concerning for renal carcinoma until proven otherwise.  Stable indeterminant subcentimeter hypodensities in the liver.  Multifocal osteoblastic metastasis including 2.5 cm lesion in the right fifth rib posteriorly and a 1.6 cm lesion in the right third rib posteriorly. No lytic lesions identified.\"  - 2/6/19: CT-guided right sclerotic fifth rib lesion biopsy - \"Metastatic carcinoma, consistent with prostate primary.  Immunohistochemical stains performed show the metastatic carcinoma stains positive for NKX3.1 (prostate marker), negative for STACIE 3 and PAX8, which supports the above diagnosis.\"  - 2/15/19: Started Casodex 50mg every day and consented for the biobanking protocol. 3/18/19 - stopped Casodex.  - 2/19/19: Case discussed in tumor board - recommendation for nephectomy for suspected malignant right renal mass.   - 2/26/19: Started Lupron 22.5mg every 3 months.   - 5/8/19: Started Docetaxel 75mg/m2 IV every 3 weeks. 06/18/19 - cycle 3, 7/10/19 - cycle 4, 07/31/19 - cycle 5, 08/21/19 - cycle 6.   - 10/2/19: Restaging CT CAP and NM Bone Scan showed improved osseous disease, no evidence of recurrent or new metastatic disease.  - 1/5/20: Restaging CT CAP and NM Bone Scan showed stable osseous disease, no evidence of recurrent or new metastatic disease.  - 4/6/20: NM bone scan with slightly improved uptake in known skeletal mets. CT C/A/P with contrast with stable osseous mets, no yusuf enlargement, no new visceral mets etc.  - 04/08/20: Zometa every 3 months.  - 10/5/20: CT C/A/P with contrast and NM bone scan - SD.   - 1/4/21: CT " "C/A/P with contrast and NM bone scan - SD but slight increase in right 5th rib tracer uptake and in posterior L5 sclerosis. 1/6/21  PSA = 0.29  - 03/29/21: CT C/A/P with contrast - single new right sacral 8mm bone lesion (suspicious), other bone mets stable. No visceral/yusuf disease. Nephrectomy site without recurrence. NM bone scan - SD but \"increased uptake associated with the prior lesions of the lower cervical spine, posterior right fourth rib and right L5 posterior arch elements. Otherwise unchanged uptake associated with the proximal left femur and left anterior fourth rib. Similar uptake of the left halux, possibly secondary to degenerative osteoarthritis or gout.\"    3/31/21 PSA = 0.44  7/7/21  PSA = 0.47  11/2021 PSA = 1.05  -- Start XTANDI  12/16/21 PSA =0.22  2/11/22 PSA= 0.50  3/22/22 PSA=0.72  5/5/2022 PSA=1.79  7/11/2022 PSA=2.16 --Start cycle 1 docetaxel   8/22/22 PSA = 1.36  9/12/22 PSA = 1.09  10/24/22 Cycle #6 of Docetaxel. End of treatment  1/9/23  PSA =0.66  3/20/23  PSA=1.54  4/21/23 PSA = 1.81  T=15  5/22/23 C1 Pluvicto   06/07/23          PSA = 1.42  7/18/23 PSA=1.75, C2 Pluvicto   8/28/23 Transfer of care initial visit for Tabby.  PSA 2.06.  C3 Pluvicto scheduled for 8/30.  Proceed with dose as planned.  MR DELORIS femur, ortho referral, PSMA PET ordered for prior to follow-up.    10/2/23 Palliative RT to L femur metastasis complete.  PSMA PET with mixed response.  PSA 2.21.  RT to L femur pending.  Continue with Dose #4 of Pluvicto despite mixed response to therapy.  Dex course for possible pain flare with RT.    11/8/23  Follow-up prior to Dose #5 Pluvicto.  More pain in chest/ribs/back. PSA 4.38.  Testosterone=3.  Rad onc referral for ribs.  Biopsy progressive lesion for progression on Pluvicto.  Cabazitaxel scheduled for follow-up appointment.  Tempus blood testing unrevealing for targetable mutation.    11/27/23 Bx completed from R iliac crest but unrevealing for either PCa or RCC.  Start " "cabazitaxel C1D1.  PSA 2.81.    12/19/23 PSA = 3.66  1/8/24 PSA = 3.55  1/29/24 PSA = 3.07  2/19/24 PSA= 2.21-Start carboplatin/cabazitaxel.    3/11/24 PSA= C2 Carbo/cabazi.  PSA 1.99.    4/01/24 PSA= 1.74. C3 Carbo/cabazi.   4/22/24 PSA= 1.85. C4 Carbo/cabazi.   5/13/24 PSA = 2.06. C5 Carbo/cabazi.   6/3/24 PSA = 2.68, cycle 6 Carboplatin/cabazitaxel  6/24/24 PSA 3.51, C7 carboplatin/cabazitaxel.  Bony lesions stable on NM bone scan.  Reticulonodular densities noted in lungs as possible infection vs drug reaction.  Add chromogranin A to next labs.   7/15/24 PSA= 5.07, chromogranin A pending, cycle 8  8/5/24 PSA= 7.12, Cycle 9 carboplatin/cabazitaxel  8/26/24 PSA= 9.88, cycle 10 carbo/cabazitaxel  9/16/24 PSA= 16.53, cycle 11 carbo/cabazitaxel. Dose reduce carboplatin from AUC 4 to AUC 3 due to fatigue, anorexia, and peripheral neuropathy.   10/7/24 PSA= 19.  Cycle 12 carbo/cabazitaxel.  Restaging imaging overall stable with exception of increasing parietal bone activity compared to prior. Dose not given due to IV fluid shortage based on [facility] leadership decision.  We are working on getting him rescheduled.      Radiation history:   -C7         3,000 cGy       06 X      8/05/2021        8/18/2021        13       10  -2 Sacrum          2,500 cGy       18 X      4/12/2022        4/18/2022         6           -Sacrum retreat               700 cGy        18 X      2/28/2023        2/28/2023  -Left femur to 2000 cGy in 5 fractions, completed 10/10/2023   -bilateral posterior chest wall at an area of osseous involvement of the ribs and adjacent T4 and 5 spine, to be delivered to 2000 cGy in 5 fractions.  completed 12/13-12/19/23.    2. Stage III ([id number]), grade 3 of 4, clear-cell RCC of right kidney:  - Incidentally diagnosed as above.   - Underwent curative-intent robotic right radical nephrectomy with Dr. Wesley Cleveland on 3/19/19. No tumor spillage per op report.   - Path showed: \"Histologic Type: Clear cell renal cell " "carcinoma; Sarcomatoid Features: Not identified; Histologic Grade: Nucleolar grade 3 (WHO/ISUP). Extent Tumor Size: 4.3 cm. Microscopic Tumor Extension: Tumor extension into renal sinus (in vascular structures). Margins: Negative. Tumor Necrosis: Present; focal. Lymph-Vascular Invasion: Not identified.  Pathologic Staging (pTNM) Primary Tumor (pT): pT3a: Tumor extends into renal vein branches/renal sinus. Regional Lymph Nodes (pN): pNX. Number of Lymph Nodes Examined: 0 Distant Metastasis (pM): pM N/A.\"  - Restaging scans as above.  No current concerns for recurrence.      PAST MEDICAL HISTORY:  Past Medical History:   Diagnosis Date     Cancer of kidney, right (H)      Complication of anesthesia     slow wakeup      Malignant neoplasm of prostate metastatic to bone (H) 2/14/2019     Thyroid disease      CURRENT MEDICATIONS:   Current Outpatient Medications:      atorvastatin (LIPITOR) 80 MG tablet, Take 80 mg by mouth daily, Disp: , Rfl:      BERBERINE CHLORIDE PO, , Disp: , Rfl:      calcium citrate-vitamin D (CITRACAL) 315-250 MG-UNIT TABS per tablet, Take 500 mg by mouth 2 times daily With 800 Vd per pt, Disp: , Rfl:      cycloSPORINE (RESTASIS) 0.05 % ophthalmic emulsion, Place 1 drop into both eyes 2 times daily , Disp: , Rfl:      dexAMETHasone (DECADRON) 4 MG tablet, Take 2 tablets (8 mg) by mouth daily Take for 3 days, starting the day after chemo. Take with food., Disp: 6 tablet, Rfl: 2     DULoxetine (CYMBALTA) 60 MG capsule, Take 1 capsule (60 mg) by mouth daily., Disp: 90 capsule, Rfl: 1     fentaNYL (DURAGESIC) 25 mcg/hr 72 hr patch, Place 1 patch onto the skin every 72 hours. remove old patch., Disp: 10 patch, Rfl: 0     furosemide (LASIX) 20 MG tablet, Take 1 tablet (20 mg) by mouth daily (Patient not taking: Reported on 9/16/2024), Disp: 15 tablet, Rfl: 1     gabapentin (NEURONTIN) 300 MG capsule, Take 1 capsule (300 mg) by mouth every morning AND 2 capsules (600 mg) daily at 2 pm AND 2 capsules " (600 mg) at bedtime., Disp: 150 capsule, Rfl: 3     ivermectin (STROMECTOL) 3 MG TABS tablet, , Disp: , Rfl:      levothyroxine (SYNTHROID/LEVOTHROID) 112 MCG tablet, Take 112 mcg by mouth daily, Disp: , Rfl:      loratadine (CLARITIN) 10 MG tablet, Take 10 mg by mouth daily, Disp: , Rfl:      Multiple Vitamins-Minerals (MULTIVITAMIN ADULT EXTRA C PO), Take 1 tablet by mouth every 24 hours, Disp: , Rfl:      naloxone (NARCAN) 4 MG/0.1ML nasal spray, Spray 1 spray (4 mg) into one nostril alternating nostrils as needed for opioid reversal every 2-3 minutes until assistance arrives, Disp: 0.2 mL, Rfl: 1     Nutritional Supplements (SALMON OIL) CAPS, Take 2 capsules by mouth daily , Disp: , Rfl:      omeprazole (PRILOSEC) 20 MG DR capsule, Take 20 mg by mouth daily, Disp: , Rfl:      ondansetron (ZOFRAN) 8 MG tablet, Take 1 tablet (8 mg) by mouth every 8 hours as needed for nausea (vomiting), Disp: 30 tablet, Rfl: 2     oxyCODONE (ROXICODONE) 5 MG tablet, Take 1-2 tablets (5-10 mg) by mouth every 3 hours as needed for severe pain., Disp: 90 tablet, Rfl: 0     predniSONE (DELTASONE) 5 MG tablet, Take 1-2 tablets (5-10 mg) by mouth 2 times daily (with meals) for 21 days., Disp: 84 tablet, Rfl: 0     predniSONE (DELTASONE) 5 MG tablet, Take 1-2 tablets (5-10 mg) by mouth 2 times daily (with meals) for 21 days., Disp: 84 tablet, Rfl: 0     prochlorperazine (COMPAZINE) 10 MG tablet, Take 1 tablet (10 mg) by mouth every 6 hours as needed for nausea or vomiting, Disp: 30 tablet, Rfl: 5     tamsulosin (FLOMAX) 0.4 MG capsule, Take 1 capsule (0.4 mg) by mouth daily, Disp: 30 capsule, Rfl: 3  No current facility-administered medications for this visit.      Physical Exam:   /75   Pulse 64   Temp 98.1  F (36.7  C) (Oral)   Resp 18   Wt 109.2 kg (240 lb 11.2 oz)   SpO2 97%   BMI 35.04 kg/m    Wt Readings from Last 10 Encounters:   10/07/24 109.2 kg (240 lb 11.2 oz)   09/23/24 108.9 kg (240 lb)   09/16/24 109.5 kg (241  lb 6.4 oz)   08/26/24 113.1 kg (249 lb 6.4 oz)   08/05/24 112 kg (247 lb)   07/24/24 113.4 kg (250 lb)   07/15/24 113.9 kg (251 lb)   07/03/24 114.8 kg (253 lb)   06/24/24 114.7 kg (252 lb 14.4 oz)   06/03/24 116 kg (255 lb 12.8 oz)   General: The patient is a pleasant male in no acute distress.  HEENT: EOMI. Sclerae are anicteric. Mucous membranes moist.    Lymph: Neck is supple. No gross adenopathy.   Heart: No JVD or edema.   Lungs: normal work of breathing on RA. No audible wheezing or rhonchi.   Abdomen:  non-distended.    Extremities: trace bilateral lower extremity edema, nonpitting edema from thigh through lower leg in left leg    Neuro: Cranial nerves II through XII are grossly intact.  Skin: No rashes, petechiae, or bruising noted on exposed skin.  Psych: affect bright, alert and oriented    LABORATORY DATA:  Most Recent 3 CBC's:  Recent Labs   Lab Test 10/07/24  0815 09/16/24  1110 08/26/24  1137   WBC 7.5 9.6 4.8   HGB 10.2* 10.2* 10.1*   * 108* 107*   * 141* 124*   ANEUTAUTO 5.9 7.6 3.4     Most Recent 3 BMP's:  Recent Labs   Lab Test 10/07/24  0815 09/16/24  1110 08/26/24  1137    142 141   POTASSIUM 4.1 3.8 3.7   CHLORIDE 103 107 105   CO2 25 22 23   BUN 15.0 17.0 15.2   CR 0.88 1.07 0.91   ANIONGAP 11 13 13   BETHANY 9.0 8.6* 8.9   * 114* 123*   PROTTOTAL 6.5 6.3* 6.5   ALBUMIN 4.0 3.8 4.0    Most Recent 3 LFT's:  Recent Labs   Lab Test 10/07/24  0815 09/16/24  1110 08/26/24  1137   AST 26 26 23   ALT 28 24 20   ALKPHOS 119 112 112   BILITOTAL 0.3 0.3 0.3     PSA trend, see oncology history.      ASSESSMENT & PLAN: Mr. Reyes is a 64 year old gentleman with metastatic castration sensitive prostate adenocarcinoma as well as an incidentally diagnosed stage III clear-cell renal cell carcinoma of the right kidney (s/p radical R nephrectomy 3/19/19).     1. Metastatic castration-resistant prostate cancer, with involvement of the bones and RPLN:   - Patient met the criteria  "for CHAARTED \"high-volume\" metastatic hormone-sensitive prostate cancer. He opted for docetaxel in combination with ADT (per JOSEFA, [id number], KARLA). He was subsequently treated with docetaxel x6 doses in the CRPC setting and enzalutamide.  He initiated Pluvicto in May 2023 with an initial slight decline in PSA, but this began to rise just prior to Cycle 3.  Given his XR evidence of progression in L femur and PSA rise, we re-evaluated his progression with PSMA PET scan in September 2023, with note of mixed response.  Canceled Pluvicto Dose for November due to symptomatic progression with PSA rise. PSA is labile. Biopsy results from 11/21 was unrevealing for evaluation of NEPC vs small cell given progression without a significant rise in PSA. Tempus peripheral blood mutation analysis is unrevealing for targetable mutations. Cabazitaxel started 11/27/23 given progression on Pluvicto. Carboplatin added 2/2024 and cabazitaxel dose reduced by 20% in April 2024 due to neuropathy.  Restaging with pulmonary consolidative findings without mass-like features, which had improved on October 2024 restaging imaging. If neuropathy remains intolerable, we could consider addition of Ra-223 and enzalutamide per PEACE-3 results, although this was for a first-line CRPC patient subset without prior ART therapy.  Notably, he has not received ART therapy in more than 3 years at the time of our October 2024 visit.  This does require continued administration of antiresorptive therapy.    -Lupron every 3 months, next due 12/30/24.    -Cabazitaxel to be continued at 20% dose reduction. Further reduced carboplatin to an AUC of 3 due to fatigue, anorexia, and worsening peripheral neuropathy in September 2024.  Unfortunately, 10/7/24 C12D1 infusion was held due to lack of IV fluids from [place].  We are actively working on getting him rescheduled to continue active therapy.   -At our 10/2024 restaging visit, we discussed " continuing carbo/cabazitaxel, which we all agreed to continue.    -He is on 10mg BID of prednisone currently.  He is going to attempt to do this dose for the week after chemo infusion then reduced back to 5mg BID.      2. Pressure in chest, resolved:  Has an ongoing sensation of pressure/need to cough in his chest, primarily against his sternum. Vital signs stable, negative pulmonary physical exam. No chest pain.     3. Bone metastases:    - Noted to have osseous metastatic disease at the time of diagnosis. S/p radiation to C spine and sacrum (Re-irradiation to sacrum).   - pain mgmt per palliative care, pain is now well controlled with fentanyl patch. Dexamethasone burst is helpful for pain flares and he has these available as needed.    -Will increase Prednisone to 5-10mg daily (can increase to 10mg dose as needed for pain)  - RT to L femur 10/2023.  RT for bilateral posterior ribs 12/2023 with Dr. Albert.  - Encouraged to continue calcium and vitamin D supplementation; continue Zometa 4mg IV every ~3 months.   - Last Zometa given 8/26/2024. Next due 11/2024.      4. Stage 3, UISS-high risk ccRCC: s/p R nephrectomy   - Nephrectomy 3/19/19 and noted incidentally.  He is now 5+ years post-op without evidence for recurrence.   - At this point, there is a minimal risk of recurrence of his RCC given the time since surgery without the use of adjuvant immunotherapy. There is no suspicious adenopathy or other features on his most recent imaging studies.    - Will continue to monitor with imaging planned for prostate cancer.      5. Sensory neuropathy, Toes/feet Grade 2  - continue monitor for worsening with cabazitaxel.   - Carboplatin dose reduced to AUC of 3 in September of 2024.    - Continue acupuncture once weekly, has decreased to every 3 weeks but finds benefit and plans to increase back to weekly after travels.   - He is on gabapentin and duloxetine managed by palliative care   -duloxetine dose recently increased  with further improvement in neuropathy. Tried weaning off gabapentin and pain in back and ribs was uncontrolled again. Continue duloxetine and gabapentin per palliative.      6. Anemia  - likely secondary to chemotherapy. Iron levels adequate on 4/22/24. MCV elevated.  Folate and B12 adequate.  Retic appropriately elevated.  Stable 9/16/24.     7. Left lower extremity swelling   - US 4/22/24 negative for DVT. CT abdomen pelvis 4/29 without significant or new lymphadenopathy. Improved lately with new compression garments. Continue compression stocking. Following with lymphedema therapy.    PLAN:   1. We are working on rescheduling this cycle of chemotherapy.  The doctors and APPs found out your chemo was being held by [employer] after you found out, so this was a complete surprise to us as well.    2. We will get you scheduled for your next cycles once we get a new time for your chemo.   3. I'm OK with you continuing the Ivermectin.  Please watch for side effects while on this medication.   4. See me back at the end of December with scans the week prior.    5. Zometa infusion as scheduled in [place].      A total of 40 minutes spent on the date of the encounter doing chart review, review of test results, interpretation of tests, patient visit, and documentation.  The patient was given the opportunity to ask multiple questions today, all of which were answered to their satisfaction.    The longitudinal plan of care for metastatic hormone resistant prostate cancer was addressed during this visit. Due to the added complexity in care, I will continue to support Walter Reyes in the subsequent management of this condition(s) and with the ongoing continuity of care of this condition(s).      Omar Mcnair MD, PhD   of Medicine   Oncology/BMT/Cellular Therapies          Again, thank you for allowing me to participate in the care of your patient.        Sincerely,        Omar Mcnair,  MD

## 2024-10-07 NOTE — TELEPHONE ENCOUNTER
Received [facility] message from patient requesting refill of fentanyl.     Last refill: 9/6/24  Last office visit: 9/23/24  Scheduled for follow up 1/2/24     Will route request to NP for review.     Reviewed [place]  Report.

## 2024-10-08 ENCOUNTER — INFUSION THERAPY VISIT (OUTPATIENT)
Dept: ONCOLOGY | Facility: CLINIC | Age: 64
End: 2024-10-08
Attending: STUDENT IN AN ORGANIZED HEALTH CARE EDUCATION/TRAINING PROGRAM
Payer: COMMERCIAL

## 2024-10-08 VITALS
DIASTOLIC BLOOD PRESSURE: 69 MMHG | HEART RATE: 59 BPM | OXYGEN SATURATION: 96 % | RESPIRATION RATE: 16 BRPM | TEMPERATURE: 98.3 F | SYSTOLIC BLOOD PRESSURE: 127 MMHG

## 2024-10-08 DIAGNOSIS — C61 MALIGNANT NEOPLASM OF PROSTATE METASTATIC TO BONE (H): Primary | ICD-10-CM

## 2024-10-08 DIAGNOSIS — C79.51 MALIGNANT NEOPLASM OF PROSTATE METASTATIC TO BONE (H): Primary | ICD-10-CM

## 2024-10-08 DIAGNOSIS — Z76.89 PREVENTION OF CHEMOTHERAPY-INDUCED NEUTROPENIA: ICD-10-CM

## 2024-10-08 PROCEDURE — 96377 APPLICATON ON-BODY INJECTOR: CPT | Mod: 59

## 2024-10-08 PROCEDURE — 96372 THER/PROPH/DIAG INJ SC/IM: CPT | Performed by: STUDENT IN AN ORGANIZED HEALTH CARE EDUCATION/TRAINING PROGRAM

## 2024-10-08 PROCEDURE — 96367 TX/PROPH/DG ADDL SEQ IV INF: CPT

## 2024-10-08 PROCEDURE — 258N000003 HC RX IP 258 OP 636: Performed by: STUDENT IN AN ORGANIZED HEALTH CARE EDUCATION/TRAINING PROGRAM

## 2024-10-08 PROCEDURE — 96417 CHEMO IV INFUS EACH ADDL SEQ: CPT

## 2024-10-08 PROCEDURE — 96413 CHEMO IV INFUSION 1 HR: CPT

## 2024-10-08 PROCEDURE — 96375 TX/PRO/DX INJ NEW DRUG ADDON: CPT

## 2024-10-08 PROCEDURE — 250N000011 HC RX IP 250 OP 636: Performed by: STUDENT IN AN ORGANIZED HEALTH CARE EDUCATION/TRAINING PROGRAM

## 2024-10-08 RX ORDER — HEPARIN SODIUM (PORCINE) LOCK FLUSH IV SOLN 100 UNIT/ML 100 UNIT/ML
5 SOLUTION INTRAVENOUS
Status: DISCONTINUED | OUTPATIENT
Start: 2024-10-08 | End: 2024-10-08 | Stop reason: HOSPADM

## 2024-10-08 RX ORDER — PALONOSETRON 0.05 MG/ML
0.25 INJECTION, SOLUTION INTRAVENOUS ONCE
Status: COMPLETED | OUTPATIENT
Start: 2024-10-08 | End: 2024-10-08

## 2024-10-08 RX ORDER — PREDNISONE 5 MG/1
5-10 TABLET ORAL 2 TIMES DAILY WITH MEALS
Qty: 84 TABLET | Refills: 0 | Status: SHIPPED | OUTPATIENT
Start: 2024-10-08 | End: 2024-10-29

## 2024-10-08 RX ADMIN — FAMOTIDINE 20 MG: 10 INJECTION INTRAVENOUS at 09:09

## 2024-10-08 RX ADMIN — SODIUM CHLORIDE 38 MG: 9 INJECTION, SOLUTION INTRAVENOUS at 10:06

## 2024-10-08 RX ADMIN — DIPHENHYDRAMINE HYDROCHLORIDE 25 MG: 50 INJECTION, SOLUTION INTRAMUSCULAR; INTRAVENOUS at 09:13

## 2024-10-08 RX ADMIN — FOSAPREPITANT: 150 INJECTION, POWDER, LYOPHILIZED, FOR SOLUTION INTRAVENOUS at 09:29

## 2024-10-08 RX ADMIN — PALONOSETRON HYDROCHLORIDE 0.25 MG: 0.25 INJECTION INTRAVENOUS at 09:08

## 2024-10-08 RX ADMIN — CARBOPLATIN 400 MG: 10 INJECTION, SOLUTION INTRAVENOUS at 11:11

## 2024-10-08 RX ADMIN — PEGFILGRASTIM 6 MG: KIT SUBCUTANEOUS at 11:17

## 2024-10-08 RX ADMIN — Medication 5 ML: at 11:43

## 2024-10-08 ASSESSMENT — PAIN SCALES - GENERAL: PAINLEVEL: NO PAIN (0)

## 2024-10-08 NOTE — PROGRESS NOTES
Infusion Nursing Note:  Walter L Leobardo Vincent presents today for C12D1 Jevtana/Carboplatin.    Patient seen by provider today: No, Dr. Mcnair 10/7/24   present during visit today: Not Applicable.    Note: Patient reports no changes overnight. Provider visit 10/7. NO new complaints.       Intravenous Access:  Implanted Port.    Treatment Conditions:  Lab Results   Component Value Date    HGB 10.2 (L) 10/07/2024    WBC 7.5 10/07/2024    ANEU 5.0 07/07/2021    ANEUTAUTO 5.9 10/07/2024     (L) 10/07/2024        Lab Results   Component Value Date     10/07/2024    POTASSIUM 4.1 10/07/2024    MAG 1.9 08/25/2023    CR 0.88 10/07/2024    BETHANY 9.0 10/07/2024    BILITOTAL 0.3 10/07/2024    ALBUMIN 4.0 10/07/2024    ALT 28 10/07/2024    AST 26 10/07/2024       Results reviewed, labs MET treatment parameters, ok to proceed with treatment.    Neulasta Onpro On-Body injector applied to right lower abdomen at 10/8/24 1117 h with light facing out.  Writer discussed Neulasta injection would start on 10/9/24 at 1417, approximately 27 hours after application applied today.  Written and Verbal instruction reviewed with patient.  Pt instructed when the dose delivery starts, it will take about 45 minutes to complete.  Pt aware Neulasta Onpro On-Body should have green flashing light and to call triage or on-call MD if injector flashes red or appears to be leaking. Pt aware to keep Onpro On-Body Neulasta 4 inches away from electrical equipment and to avoid showering 4 hours prior to injection.   Neulasta Onpro Lot number: See MAR     Post Infusion Assessment:  Patient tolerated injection without incident.  Site patent and intact, free from redness, edema or discomfort.  No evidence of extravasations.  Access discontinued per protocol.       Discharge Plan:   Prescription refills given for Prednisone.  Discharge instructions reviewed with: Patient and Family.  Patient and/or family verbalized understanding of  discharge instructions and all questions answered.  AVS to patient via [employer].  Patient will return 10/28 for next appointment.   Patient discharged in stable condition accompanied by: self and wife.  Departure Mode: Ambulatory.      ANDRESSA GAMBOA RN

## 2024-10-09 LAB — TESTOST SERPL-MCNC: <2 NG/DL (ref 240–950)

## 2024-10-18 DIAGNOSIS — C61 PROSTATE CANCER (H): ICD-10-CM

## 2024-10-18 DIAGNOSIS — G89.3 CANCER ASSOCIATED PAIN: ICD-10-CM

## 2024-10-18 RX ORDER — FENTANYL 25 UG/1
1 PATCH TRANSDERMAL
Qty: 5 PATCH | Refills: 0 | Status: SHIPPED | OUTPATIENT
Start: 2024-10-18 | End: 2024-11-11

## 2024-10-25 NOTE — PROGRESS NOTES
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      INTERVAL HISTORY:   Walter is seen accompanied by his wife. He is doing well. [place] a great time. Walter surprised himself and his wife with how much walking he was able to tolerate. He did have a fall on the trip where he missed a step on the stairs. He reports he did not get seriously injuried and caught himself. He does have mild left shoulder blade pain after the fall that improves with tylenol. His left hip is bothering him a little bit today which he believes is due to prolonged sitting on the plane. He continues on his fentanyl patch and oxycodone 5 mg at HS. He has not required a dose escalation of his oxycodone after his fall. No fevers or chills. Neuropathy has been gradually increasing over the course of cabzitaxel/carboplatin but no dramatic increases over the past few weeks. oes and bottom of the feet are most active sites for neuropathy. He has fatigue and nausea the first week after chemotherapy that slowly improves into the second and third week. He continues on Prednisone 10 mg in the AM and 5 mg in the evening (down from 10 mg BID). He continues acupuncture. He has been working on continued to get protein in his diet. No chest pain, shortness of breath, or cough.     Review of Systems:  Remainder of 12 point ROS reviewed and otherwise negative except as in interval history.       ONCOLOGY HISTORY: Mr. 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 His oncologic history is detailed below.     1. De deja metastatic prostate adenocarcinoma, stage IV (M1b at diagnosis), high-volume,  "castration-sensitive:  - 12/13/2018: PSA found to be elevated to 9.1 ng/mL on a routine follow-up with primary care provider Dr. Naylor at [facility]. Prior PSA were 1.4 on 8/16/17, 2.4 on 6/28/16, 2.9 on 6/28/16, and as low as 0.4 on 3/26/2003.   - 1/08/2019: Consultation with Sarah Wilson CNP in Urology clinic. Repeat PSA 9.6.  - 1/16/2019: MRI prostate with contrast - \"This examination is characterized as PIRADS 5- very high probability. Clinically significant cancer is highly likely to be present. There is a large, invasive mass arising from the right peripheral zone and extending into the neurovascular bundle, seminal vesicle, and along the anterolateral right mesorectal fascia. Metastatic right external iliac lymph node. Metastatic lesion in the posterior/superior right acetabulum.\"  - 1/25/2019: CT abdomen and pelvis with IV contrast - \"1. Heterogeneous enhancing mass posteriorly in the upper pole of the right kidney measures 4.5 x 5.8 x 5.7 cm (AP by transverse by craniocaudal). It has a small nodular component extending posterior medially which abuts the right psoas muscle. This nodular extension measures 2.1 x 2.1 cm. Minimal stranding about the mass. No definite thrombus within the right renal vein. This renal mass is compatible with a renal cell carcinoma. A paraaortic lymph node situated immediately posterior to the left renal vein measures 1.1 cm in short axis, suspicious for metastasis. 2. A 2 cm right external iliac lymph node is also suspicious for metastases. 3. Multiple sclerotic osseous lesions suspicious for metastases. These include 0.8 and 0.6 cm sclerotic lesions laterally in the right iliac wing (images 53 and 62 respectively). Ill-defined groundglass density laterally in the right acetabulum measuring 1.7 x 1.2 cm corresponds with the lesion identified on MRI. A 0.4 cm groundglass density in the left acetabulum. Sclerotic lesion in the left femoral neck measures 0.9 x 1.6 cm (image " "81). Sclerotic metastases would be more compatible with prostate metastases. 4. A 3 subcentimeter hepatic lesions are indeterminate. Metastases would be a consideration. These can be further characterized with liver MRI.\"  - 1/25/2019: NM bone scan - \"There is focal bony uptake in the left femoral neck, right acetabulum, right of midline at the S1 or L5 level of the spine, within multiple bilateral ribs, in the C7 or T1 level of the spine, and anteriorly within the skull. Findings are suspicious for metastases.\"  - 1/31/19: CT chest with contrast - \" No suspicious nodules in the chest.  Stable appearance of a 5.8 cm right renal mass concerning for renal carcinoma until proven otherwise.  Stable indeterminant subcentimeter hypodensities in the liver.  Multifocal osteoblastic metastasis including 2.5 cm lesion in the right fifth rib posteriorly and a 1.6 cm lesion in the right third rib posteriorly. No lytic lesions identified.\"  - 2/6/19: CT-guided right sclerotic fifth rib lesion biopsy - \"Metastatic carcinoma, consistent with prostate primary.  Immunohistochemical stains performed show the metastatic carcinoma stains positive for NKX3.1 (prostate marker), negative for STACIE 3 and PAX8, which supports the above diagnosis.\"  - 2/15/19: Started Casodex 50mg every day and consented for the biobanking protocol. 3/18/19 - stopped Casodex.  - 2/19/19: Case discussed in tumor board - recommendation for nephectomy for suspected malignant right renal mass.   - 2/26/19: Started Lupron 22.5mg every 3 months.   - 5/8/19: Started Docetaxel 75mg/m2 IV every 3 weeks. 06/18/19 - cycle 3, 7/10/19 - cycle 4, 07/31/19 - cycle 5, 08/21/19 - cycle 6.   - 10/2/19: Restaging CT CAP and NM Bone Scan showed improved osseous disease, no evidence of recurrent or new metastatic disease.  - 1/5/20: Restaging CT CAP and NM Bone Scan showed stable osseous disease, no evidence of recurrent or new metastatic disease.  - 4/6/20: NM bone scan with " "slightly improved uptake in known skeletal mets. CT C/A/P with contrast with stable osseous mets, no yusuf enlargement, no new visceral mets etc.  - 04/08/20: Zometa every 3 months.  - 10/5/20: CT C/A/P with contrast and NM bone scan - SD.   - 1/4/21: CT C/A/P with contrast and NM bone scan - SD but slight increase in right 5th rib tracer uptake and in posterior L5 sclerosis. 1/6/21  PSA = 0.29  - 03/29/21: CT C/A/P with contrast - single new right sacral 8mm bone lesion (suspicious), other bone mets stable. No visceral/yusuf disease. Nephrectomy site without recurrence. NM bone scan - SD but \"increased uptake associated with the prior lesions of the lower cervical spine, posterior right fourth rib and right L5 posterior arch elements. Otherwise unchanged uptake associated with the proximal left femur and left anterior fourth rib. Similar uptake of the left halux, possibly secondary to degenerative osteoarthritis or gout.\"    3/31/21 PSA = 0.44  7/7/21  PSA = 0.47  11/2021 PSA = 1.05  -- Start XTANDI  12/16/21 PSA =0.22  2/11/22 PSA= 0.50  3/22/22 PSA=0.72  5/5/2022 PSA=1.79  7/11/2022 PSA=2.16 --Start cycle 1 docetaxel   8/22/22 PSA = 1.36  9/12/22 PSA = 1.09  10/24/22 Cycle #6 of Docetaxel. End of treatment  1/9/23  PSA =0.66  3/20/23  PSA=1.54  4/21/23 PSA = 1.81  T=15  5/22/23 C1 Pluvicto   06/07/23          PSA = 1.42  7/18/23 PSA=1.75, C2 Pluvicto   8/28/23 Transfer of care initial visit for Tabby.  PSA 2.06.  C3 Pluvicto scheduled for 8/30.  Proceed with dose as planned.  MR L femur, ortho referral, PSMA PET ordered for prior to follow-up.    10/2/23 Palliative RT to L femur metastasis complete.  PSMA PET with mixed response.  PSA 2.21.  RT to L femur pending.  Continue with Dose #4 of Pluvicto despite mixed response to therapy.  Dex course for possible pain flare with RT.    11/8/23  Follow-up prior to Dose #5 Pluvicto.  More pain in chest/ribs/back. PSA 4.38.  Testosterone=3.  Rad onc referral for ribs.  " Biopsy progressive lesion for progression on Pluvicto.  Cabazitaxel scheduled for follow-up appointment.  Tempus blood testing unrevealing for targetable mutation.    11/27/23 Bx completed from R iliac crest but unrevealing for either PCa or RCC.  Start cabazitaxel C1D1.  PSA 2.81.    12/19/23 PSA = 3.66  1/8/24 PSA = 3.55  1/29/24 PSA = 3.07  2/19/24 PSA= 2.21-Start carboplatin/cabazitaxel.    3/11/24 PSA= C2 Carbo/cabazi.  PSA 1.99.    4/01/24 PSA= 1.74. C3 Carbo/cabazi.   4/22/24 PSA= 1.85. C4 Carbo/cabazi.   5/13/24 PSA = 2.06. C5 Carbo/cabazi.   6/3/24 PSA = 2.68, cycle 6 Carboplatin/cabazitaxel  6/24/24 PSA 3.51, C7 carboplatin/cabazitaxel.  Bony lesions stable on NM bone scan.  Reticulonodular densities noted in lungs as possible infection vs drug reaction.  Add chromogranin A to next labs.   7/15/24 PSA= 5.07, chromogranin A pending, cycle 8  8/5/24 PSA= 7.12, Cycle 9 carboplatin/cabazitaxel  8/26/24 PSA= 9.88, cycle 10 carbo/cabazitaxel  9/16/24 PSA= 16.53, cycle 11 carbo/cabazitaxel. Dose reduce carboplatin from AUC 4 to AUC 3 due to fatigue, anorexia, and peripheral neuropathy.   10/7/24 PSA= 19.  Cycle 12 carbo/cabazitaxel given on 10/8/2024.   10/28/24 PSA pending. Cycle 13 carboplatin/cabazitaxel.     Radiation history:   -C7         3,000 cGy       06 X      8/05/2021        8/18/2021        13       10  -2 Sacrum          2,500 cGy       18 X      4/12/2022        4/18/2022         6           -Sacrum retreat               700 cGy        18 X      2/28/2023        2/28/2023  -Left femur to 2000 cGy in 5 fractions, completed 10/10/2023   -bilateral posterior chest wall at an area of osseous involvement of the ribs and adjacent T4 and 5 spine, to be delivered to 2000 cGy in 5 fractions.  completed 12/13-12/19/23.    2. Stage III ([id number]), grade 3 of 4, clear-cell RCC of right kidney:  - Incidentally diagnosed as above.   - Underwent curative-intent robotic right radical nephrectomy with Dr. Olson  "Cristofer on 3/19/19. No tumor spillage per op report.   - Path showed: \"Histologic Type: Clear cell renal cell carcinoma; Sarcomatoid Features: Not identified; Histologic Grade: Nucleolar grade 3 (WHO/ISUP). Extent Tumor Size: 4.3 cm. Microscopic Tumor Extension: Tumor extension into renal sinus (in vascular structures). Margins: Negative. Tumor Necrosis: Present; focal. Lymph-Vascular Invasion: Not identified.  Pathologic Staging (pTNM) Primary Tumor (pT): pT3a: Tumor extends into renal vein branches/renal sinus. Regional Lymph Nodes (pN): pNX. Number of Lymph Nodes Examined: 0 Distant Metastasis (pM): pM N/A.\"  - Restaging scans as above.  No current concerns for recurrence.      PAST MEDICAL HISTORY:  Past Medical History:   Diagnosis Date    Cancer of kidney, right (H)     Complication of anesthesia     slow wakeup     Malignant neoplasm of prostate metastatic to bone (H) 2/14/2019    Thyroid disease      CURRENT MEDICATIONS:   Current Outpatient Medications:     atorvastatin (LIPITOR) 80 MG tablet, Take 80 mg by mouth daily, Disp: , Rfl:     BERBERINE CHLORIDE PO, , Disp: , Rfl:     calcium citrate-vitamin D (CITRACAL) 315-250 MG-UNIT TABS per tablet, Take 500 mg by mouth 2 times daily With 800 Vd per pt, Disp: , Rfl:     cycloSPORINE (RESTASIS) 0.05 % ophthalmic emulsion, Place 1 drop into both eyes 2 times daily , Disp: , Rfl:     dexAMETHasone (DECADRON) 4 MG tablet, Take 2 tablets (8 mg) by mouth daily Take for 3 days, starting the day after chemo. Take with food., Disp: 6 tablet, Rfl: 2    DULoxetine (CYMBALTA) 60 MG capsule, Take 1 capsule (60 mg) by mouth daily., Disp: 90 capsule, Rfl: 1    fentaNYL (DURAGESIC) 25 mcg/hr 72 hr patch, Place 1 patch onto the skin every 72 hours. Patient on vacation in [place], forgot fentanyl patches at home in [place]. Has prostate cancer with cancer pain. I talked with Cammie PARISI Pharmacist at [facility]. Remove old patch when placing new patch., Disp: 5 patch, Rfl: " 0    furosemide (LASIX) 20 MG tablet, Take 1 tablet (20 mg) by mouth daily (Patient not taking: Reported on 9/16/2024), Disp: 15 tablet, Rfl: 1    gabapentin (NEURONTIN) 300 MG capsule, Take 1 capsule (300 mg) by mouth every morning AND 2 capsules (600 mg) daily at 2 pm AND 2 capsules (600 mg) at bedtime., Disp: 150 capsule, Rfl: 3    ivermectin (STROMECTOL) 3 MG TABS tablet, , Disp: , Rfl:     levothyroxine (SYNTHROID/LEVOTHROID) 112 MCG tablet, Take 112 mcg by mouth daily, Disp: , Rfl:     loratadine (CLARITIN) 10 MG tablet, Take 10 mg by mouth daily, Disp: , Rfl:     Multiple Vitamins-Minerals (MULTIVITAMIN ADULT EXTRA C PO), Take 1 tablet by mouth every 24 hours, Disp: , Rfl:     naloxone (NARCAN) 4 MG/0.1ML nasal spray, Spray 1 spray (4 mg) into one nostril alternating nostrils as needed for opioid reversal every 2-3 minutes until assistance arrives, Disp: 0.2 mL, Rfl: 1    Nutritional Supplements (SALMON OIL) CAPS, Take 2 capsules by mouth daily , Disp: , Rfl:     omeprazole (PRILOSEC) 20 MG DR capsule, Take 20 mg by mouth daily, Disp: , Rfl:     ondansetron (ZOFRAN) 8 MG tablet, Take 1 tablet (8 mg) by mouth every 8 hours as needed for nausea (vomiting), Disp: 30 tablet, Rfl: 2    oxyCODONE (ROXICODONE) 5 MG tablet, Take 1-2 tablets (5-10 mg) by mouth every 3 hours as needed for severe pain., Disp: 90 tablet, Rfl: 0    predniSONE (DELTASONE) 5 MG tablet, Take 1-2 tablets (5-10 mg) by mouth 2 times daily (with meals) for 21 days., Disp: 84 tablet, Rfl: 0    prochlorperazine (COMPAZINE) 10 MG tablet, Take 1 tablet (10 mg) by mouth every 6 hours as needed for nausea or vomiting, Disp: 30 tablet, Rfl: 5    tamsulosin (FLOMAX) 0.4 MG capsule, Take 1 capsule (0.4 mg) by mouth daily, Disp: 30 capsule, Rfl: 3      Physical Exam:   /74   Pulse 72   Temp 98.9  F (37.2  C) (Oral)   Resp 16   Wt 109.5 kg (241 lb 8 oz)   SpO2 95%   BMI 35.15 kg/m    Wt Readings from Last 10 Encounters:   10/28/24 109.5 kg (241  lb 8 oz)   10/07/24 109.2 kg (240 lb 11.2 oz)   09/23/24 108.9 kg (240 lb)   09/16/24 109.5 kg (241 lb 6.4 oz)   08/26/24 113.1 kg (249 lb 6.4 oz)   08/05/24 112 kg (247 lb)   07/24/24 113.4 kg (250 lb)   07/15/24 113.9 kg (251 lb)   07/03/24 114.8 kg (253 lb)   06/24/24 114.7 kg (252 lb 14.4 oz)   General: The patient is a pleasant male in no acute distress.  HEENT: EOMI. Sclerae are anicteric. Mucous membranes moist.    Lymph: Neck is supple. No gross adenopathy.   Heart: No JVD or edema.   Lungs: normal work of breathing on RA. No audible wheezing or rhonchi.   Abdomen:  non-distended.    Extremities: trace bilateral lower extremity edema, nonpitting edema from thigh through lower leg in left leg.   MSK: no tenderness to palpation along the spine and left scapula. Ambulates onto the exam table without assistance.      Neuro: Cranial nerves II through XII are grossly intact.  Skin: No rashes, petechiae, or bruising noted on exposed skin.  Psych: affect bright, alert and oriented    LABORATORY DATA:  Most Recent 3 CBC's:  Recent Labs   Lab Test 10/28/24  1020 10/07/24  0815 09/16/24  1110   WBC 8.4 7.5 9.6   HGB 9.4* 10.2* 10.2*   * 108* 108*   * 129* 141*   ANEUTAUTO 7.4 5.9 7.6     Most Recent 3 BMP's:  Recent Labs   Lab Test 10/28/24  1020 10/07/24  0815 09/16/24  1110    139 142   POTASSIUM 4.1 4.1 3.8   CHLORIDE 107 103 107   CO2 23 25 22   BUN 17.8 15.0 17.0   CR 0.89 0.88 1.07   ANIONGAP 11 11 13   BETHANY 9.2 9.0 8.6*   * 105* 114*   PROTTOTAL 6.4 6.5 6.3*   ALBUMIN 4.0 4.0 3.8    Most Recent 3 LFT's:  Recent Labs   Lab Test 10/28/24  1020 10/07/24  0815 09/16/24  1110   AST 32 26 26   ALT 20 28 24   ALKPHOS 132 119 112   BILITOTAL 0.4 0.3 0.3     PSA trend, see oncology history.      ASSESSMENT & PLAN: Mr. Reyes is a 64 year old gentleman with metastatic castration sensitive prostate adenocarcinoma as well as an incidentally diagnosed stage III clear-cell renal cell  "carcinoma of the right kidney (s/p radical R nephrectomy 3/19/19).     1. Metastatic castration-resistant prostate cancer, with involvement of the bones and RPLN:   - Patient met the criteria for CHAARTED \"high-volume\" metastatic hormone-sensitive prostate cancer. He opted for docetaxel in combination with ADT (per JOSEFA, [id number], STAMPEDE). He was subsequently treated with docetaxel x6 doses in the CRPC setting and enzalutamide.  He initiated Pluvicto in May 2023 with an initial slight decline in PSA, but this began to rise just prior to Cycle 3.  Given his XR evidence of progression in L femur and PSA rise, we re-evaluated his progression with PSMA PET scan in September 2023, with note of mixed response.  Canceled Pluvicto Dose for November due to symptomatic progression with PSA rise. PSA is labile. Biopsy results from 11/21 was unrevealing for evaluation of NEPC vs small cell given progression without a significant rise in PSA. Tempus peripheral blood mutation analysis is unrevealing for targetable mutations. Cabazitaxel started 11/27/23 given progression on Pluvicto. Carboplatin added 2/2024 and cabazitaxel dose reduced by 20% in April 2024 due to neuropathy.  Restaging with pulmonary consolidative findings without mass-like features, which had improved on October 2024 restaging imaging. If neuropathy remains intolerable, we could consider addition of Ra-223 and enzalutamide per PEACE-3 results, although this was for a first-line CRPC patient subset without prior ART therapy.  Notably, he has not received ART therapy in more than 3 years at the time of our October 2024. This does require continued administration of antiresorptive therapy.    -Lupron every 3 months, next due 12/30/24.    -Cabazitaxel to be continued at 20% dose reduction. Further reduced carboplatin to an AUC of 3 due to fatigue, anorexia, and worsening peripheral neuropathy in September 2024. Will continue at same dose today,10/28/24.  "   -He has tapered prednisone from 10 mg BID to 10 mg in the AM and 5 mg in the PM. Will continue to revisit tapering further to 5 mg BID.  - CBC and CMP generally stable and notable for ongoing mild anemia and thrombocytopenia.     2. Pressure in chest, resolved:  Has an ongoing sensation of pressure/need to cough in his chest, primarily against his sternum. Vital signs stable, negative pulmonary physical exam. No chest pain. No recurrence 10/28/24.     3. Bone metastases:    - Noted to have osseous metastatic disease at the time of diagnosis. S/p radiation to C spine and sacrum (Re-irradiation to sacrum).   - pain mgmt per palliative care, pain is now well controlled with fentanyl patch. Dexamethasone burst is helpful for pain flares and he has these available as needed.    -Will increase Prednisone to 5-10mg daily (can increase to 10mg dose as needed for pain)  - RT to L femur 10/2023.  RT for bilateral posterior ribs 12/2023 with Dr. Albert.  - Encouraged to continue calcium and vitamin D supplementation; continue Zometa 4mg IV every ~3 months.   - Last Zometa given 8/26/2024. Next due 11/2024.   - Left hip and shoulder pain on 10/28/24 after fall and traveling. Continue to monitor closely. Managed with tylenol at 10/28/24. Discussed if pain fails to improve Walter should let us know so that we can further investigate.      4. Stage 3, UISS-high risk ccRCC: s/p R nephrectomy   - Nephrectomy 3/19/19 and noted incidentally.  He is now 5+ years post-op without evidence for recurrence.   - At this point, there is a minimal risk of recurrence of his RCC given the time since surgery without the use of adjuvant immunotherapy. There is no suspicious adenopathy or other features on his most recent imaging studies.    - Will continue to monitor with imaging planned for prostate cancer.    - not specifically discussed 10/28/24.     5. Sensory neuropathy, Toes/feet Grade 2  - continue monitor for worsening with cabazitaxel.    - Carboplatin dose reduced to AUC of 3 in September of 2024.    - Continue acupuncture once weekly, has decreased to every 3 weeks but finds benefit and plans to increase back to weekly after travels.   - He is on gabapentin and duloxetine managed by palliative care   -duloxetine dose recently increased with further improvement in neuropathy. Tried weaning off gabapentin and pain in back and ribs was uncontrolled again. Continue duloxetine and gabapentin per palliative.      6. Anemia  - likely secondary to chemotherapy. Iron levels adequate on 4/22/24. MCV elevated.  Folate and B12 adequate.  Retic appropriately elevated.  Stable 10/28/24    7. Left lower extremity swelling   - US 4/22/24 negative for DVT. CT abdomen pelvis 4/29 without significant or new lymphadenopathy. Improved lately with new compression garments. Continue compression stocking. Following with lymphedema therapy.      Plan:   Proceed with cycle 13 carboplatin/cabazitaxel today 10/28/24. RTC in three weeks with next dose. Monitor left hip and left scapular pain.     33 minutes spent on the date of the encounter doing chart review, review of test results, interpretation of tests, patient visit, and documentation     Yamilet Espinoza PA-C

## 2024-10-28 ENCOUNTER — INFUSION THERAPY VISIT (OUTPATIENT)
Dept: ONCOLOGY | Facility: CLINIC | Age: 64
End: 2024-10-28
Payer: COMMERCIAL

## 2024-10-28 ENCOUNTER — APPOINTMENT (OUTPATIENT)
Dept: LAB | Facility: CLINIC | Age: 64
End: 2024-10-28
Payer: COMMERCIAL

## 2024-10-28 ENCOUNTER — ONCOLOGY VISIT (OUTPATIENT)
Dept: ONCOLOGY | Facility: CLINIC | Age: 64
End: 2024-10-28
Payer: COMMERCIAL

## 2024-10-28 VITALS
RESPIRATION RATE: 16 BRPM | WEIGHT: 241.5 LBS | HEART RATE: 72 BPM | OXYGEN SATURATION: 95 % | TEMPERATURE: 98.9 F | BODY MASS INDEX: 35.15 KG/M2 | SYSTOLIC BLOOD PRESSURE: 120 MMHG | DIASTOLIC BLOOD PRESSURE: 74 MMHG

## 2024-10-28 DIAGNOSIS — C79.51 MALIGNANT NEOPLASM OF PROSTATE METASTATIC TO BONE (H): Primary | ICD-10-CM

## 2024-10-28 DIAGNOSIS — C61 MALIGNANT NEOPLASM OF PROSTATE METASTATIC TO BONE (H): Primary | ICD-10-CM

## 2024-10-28 DIAGNOSIS — C61 PROSTATE CANCER (H): ICD-10-CM

## 2024-10-28 DIAGNOSIS — Z76.89 PREVENTION OF CHEMOTHERAPY-INDUCED NEUTROPENIA: ICD-10-CM

## 2024-10-28 LAB
ALBUMIN SERPL BCG-MCNC: 4 G/DL (ref 3.5–5.2)
ALP SERPL-CCNC: 132 U/L (ref 40–150)
ALT SERPL W P-5'-P-CCNC: 20 U/L (ref 0–70)
ANION GAP SERPL CALCULATED.3IONS-SCNC: 11 MMOL/L (ref 7–15)
AST SERPL W P-5'-P-CCNC: 32 U/L (ref 0–45)
BASOPHILS # BLD AUTO: 0 10E3/UL (ref 0–0.2)
BASOPHILS NFR BLD AUTO: 0 %
BILIRUB SERPL-MCNC: 0.4 MG/DL
BUN SERPL-MCNC: 17.8 MG/DL (ref 8–23)
CALCIUM SERPL-MCNC: 9.2 MG/DL (ref 8.8–10.4)
CHLORIDE SERPL-SCNC: 107 MMOL/L (ref 98–107)
CREAT SERPL-MCNC: 0.89 MG/DL (ref 0.67–1.17)
EGFRCR SERPLBLD CKD-EPI 2021: >90 ML/MIN/1.73M2
EOSINOPHIL # BLD AUTO: 0 10E3/UL (ref 0–0.7)
EOSINOPHIL NFR BLD AUTO: 0 %
ERYTHROCYTE [DISTWIDTH] IN BLOOD BY AUTOMATED COUNT: 18.1 % (ref 10–15)
GLUCOSE SERPL-MCNC: 136 MG/DL (ref 70–99)
HCO3 SERPL-SCNC: 23 MMOL/L (ref 22–29)
HCT VFR BLD AUTO: 29.6 % (ref 40–53)
HGB BLD-MCNC: 9.4 G/DL (ref 13.3–17.7)
IMM GRANULOCYTES # BLD: 0.1 10E3/UL
IMM GRANULOCYTES NFR BLD: 1 %
LYMPHOCYTES # BLD AUTO: 0.4 10E3/UL (ref 0.8–5.3)
LYMPHOCYTES NFR BLD AUTO: 5 %
MCH RBC QN AUTO: 34.3 PG (ref 26.5–33)
MCHC RBC AUTO-ENTMCNC: 31.8 G/DL (ref 31.5–36.5)
MCV RBC AUTO: 108 FL (ref 78–100)
MONOCYTES # BLD AUTO: 0.5 10E3/UL (ref 0–1.3)
MONOCYTES NFR BLD AUTO: 6 %
NEUTROPHILS # BLD AUTO: 7.4 10E3/UL (ref 1.6–8.3)
NEUTROPHILS NFR BLD AUTO: 88 %
NRBC # BLD AUTO: 0 10E3/UL
NRBC BLD AUTO-RTO: 0 /100
PLATELET # BLD AUTO: 117 10E3/UL (ref 150–450)
POTASSIUM SERPL-SCNC: 4.1 MMOL/L (ref 3.4–5.3)
PROT SERPL-MCNC: 6.4 G/DL (ref 6.4–8.3)
PSA SERPL DL<=0.01 NG/ML-MCNC: 24.36 NG/ML (ref 0–4.5)
RBC # BLD AUTO: 2.74 10E6/UL (ref 4.4–5.9)
SODIUM SERPL-SCNC: 141 MMOL/L (ref 135–145)
WBC # BLD AUTO: 8.4 10E3/UL (ref 4–11)

## 2024-10-28 PROCEDURE — 96367 TX/PROPH/DG ADDL SEQ IV INF: CPT

## 2024-10-28 PROCEDURE — 84153 ASSAY OF PSA TOTAL: CPT

## 2024-10-28 PROCEDURE — 85004 AUTOMATED DIFF WBC COUNT: CPT

## 2024-10-28 PROCEDURE — 96372 THER/PROPH/DIAG INJ SC/IM: CPT

## 2024-10-28 PROCEDURE — 96375 TX/PRO/DX INJ NEW DRUG ADDON: CPT

## 2024-10-28 PROCEDURE — 258N000003 HC RX IP 258 OP 636

## 2024-10-28 PROCEDURE — 36591 DRAW BLOOD OFF VENOUS DEVICE: CPT

## 2024-10-28 PROCEDURE — 84155 ASSAY OF PROTEIN SERUM: CPT

## 2024-10-28 PROCEDURE — 84403 ASSAY OF TOTAL TESTOSTERONE: CPT

## 2024-10-28 PROCEDURE — 96377 APPLICATON ON-BODY INJECTOR: CPT | Mod: 59

## 2024-10-28 PROCEDURE — 99214 OFFICE O/P EST MOD 30 MIN: CPT

## 2024-10-28 PROCEDURE — 250N000011 HC RX IP 250 OP 636

## 2024-10-28 PROCEDURE — 96413 CHEMO IV INFUSION 1 HR: CPT

## 2024-10-28 PROCEDURE — 99213 OFFICE O/P EST LOW 20 MIN: CPT | Mod: 25

## 2024-10-28 PROCEDURE — 85014 HEMATOCRIT: CPT

## 2024-10-28 PROCEDURE — 82947 ASSAY GLUCOSE BLOOD QUANT: CPT

## 2024-10-28 PROCEDURE — 250N000013 HC RX MED GY IP 250 OP 250 PS 637

## 2024-10-28 PROCEDURE — 96417 CHEMO IV INFUS EACH ADDL SEQ: CPT

## 2024-10-28 RX ORDER — PREDNISONE 5 MG/1
5-10 TABLET ORAL 2 TIMES DAILY WITH MEALS
Qty: 84 TABLET | Refills: 0 | Status: SHIPPED | OUTPATIENT
Start: 2024-10-28 | End: 2024-11-18

## 2024-10-28 RX ORDER — DIPHENHYDRAMINE HYDROCHLORIDE 50 MG/ML
25 INJECTION INTRAMUSCULAR; INTRAVENOUS
Status: CANCELLED
Start: 2024-10-29

## 2024-10-28 RX ORDER — EPINEPHRINE 1 MG/ML
0.3 INJECTION, SOLUTION INTRAMUSCULAR; SUBCUTANEOUS EVERY 5 MIN PRN
Status: CANCELLED | OUTPATIENT
Start: 2024-10-29

## 2024-10-28 RX ORDER — LORAZEPAM 2 MG/ML
0.5 INJECTION INTRAMUSCULAR EVERY 4 HOURS PRN
Status: CANCELLED | OUTPATIENT
Start: 2024-10-29

## 2024-10-28 RX ORDER — MEPERIDINE HYDROCHLORIDE 25 MG/ML
25 INJECTION INTRAMUSCULAR; INTRAVENOUS; SUBCUTANEOUS
Status: CANCELLED | OUTPATIENT
Start: 2024-10-29

## 2024-10-28 RX ORDER — HEPARIN SODIUM,PORCINE 10 UNIT/ML
5-20 VIAL (ML) INTRAVENOUS DAILY PRN
Status: CANCELLED | OUTPATIENT
Start: 2024-10-29

## 2024-10-28 RX ORDER — HEPARIN SODIUM (PORCINE) LOCK FLUSH IV SOLN 100 UNIT/ML 100 UNIT/ML
5 SOLUTION INTRAVENOUS
Status: DISCONTINUED | OUTPATIENT
Start: 2024-10-28 | End: 2024-10-28 | Stop reason: HOSPADM

## 2024-10-28 RX ORDER — DIPHENHYDRAMINE HCL 25 MG
25 CAPSULE ORAL ONCE
Status: COMPLETED | OUTPATIENT
Start: 2024-10-28 | End: 2024-10-28

## 2024-10-28 RX ORDER — DIPHENHYDRAMINE HCL 25 MG
25 CAPSULE ORAL ONCE
Status: CANCELLED
Start: 2024-10-29 | End: 2024-10-29

## 2024-10-28 RX ORDER — ALBUTEROL SULFATE 90 UG/1
1-2 INHALANT RESPIRATORY (INHALATION)
Status: CANCELLED
Start: 2024-10-29

## 2024-10-28 RX ORDER — PALONOSETRON 0.05 MG/ML
0.25 INJECTION, SOLUTION INTRAVENOUS ONCE
Status: COMPLETED | OUTPATIENT
Start: 2024-10-28 | End: 2024-10-28

## 2024-10-28 RX ORDER — ALBUTEROL SULFATE 0.83 MG/ML
2.5 SOLUTION RESPIRATORY (INHALATION)
Status: CANCELLED | OUTPATIENT
Start: 2024-10-29

## 2024-10-28 RX ORDER — DIPHENHYDRAMINE HYDROCHLORIDE 50 MG/ML
50 INJECTION INTRAMUSCULAR; INTRAVENOUS
Status: CANCELLED
Start: 2024-10-29

## 2024-10-28 RX ORDER — PALONOSETRON 0.05 MG/ML
0.25 INJECTION, SOLUTION INTRAVENOUS ONCE
Status: CANCELLED | OUTPATIENT
Start: 2024-10-29

## 2024-10-28 RX ORDER — HEPARIN SODIUM (PORCINE) LOCK FLUSH IV SOLN 100 UNIT/ML 100 UNIT/ML
5 SOLUTION INTRAVENOUS ONCE
Status: COMPLETED | OUTPATIENT
Start: 2024-10-28 | End: 2024-10-28

## 2024-10-28 RX ORDER — METHYLPREDNISOLONE SODIUM SUCCINATE 40 MG/ML
40 INJECTION INTRAMUSCULAR; INTRAVENOUS
Status: CANCELLED
Start: 2024-10-29

## 2024-10-28 RX ORDER — HEPARIN SODIUM (PORCINE) LOCK FLUSH IV SOLN 100 UNIT/ML 100 UNIT/ML
5 SOLUTION INTRAVENOUS
Status: CANCELLED | OUTPATIENT
Start: 2024-10-29

## 2024-10-28 RX ADMIN — SODIUM CHLORIDE 38 MG: 9 INJECTION, SOLUTION INTRAVENOUS at 12:25

## 2024-10-28 RX ADMIN — FAMOTIDINE 20 MG: 10 INJECTION INTRAVENOUS at 11:30

## 2024-10-28 RX ADMIN — Medication 5 ML: at 10:22

## 2024-10-28 RX ADMIN — DIPHENHYDRAMINE HYDROCHLORIDE 25 MG: 25 CAPSULE ORAL at 11:30

## 2024-10-28 RX ADMIN — Medication 5 ML: at 14:11

## 2024-10-28 RX ADMIN — FOSAPREPITANT: 150 INJECTION, POWDER, LYOPHILIZED, FOR SOLUTION INTRAVENOUS at 12:00

## 2024-10-28 RX ADMIN — PEGFILGRASTIM 6 MG: KIT SUBCUTANEOUS at 13:41

## 2024-10-28 RX ADMIN — PALONOSETRON HYDROCHLORIDE 0.25 MG: 0.25 INJECTION INTRAVENOUS at 11:30

## 2024-10-28 RX ADMIN — CARBOPLATIN 400 MG: 10 INJECTION, SOLUTION INTRAVENOUS at 13:36

## 2024-10-28 ASSESSMENT — PAIN SCALES - GENERAL: PAINLEVEL_OUTOF10: MILD PAIN (2)

## 2024-10-28 NOTE — PROGRESS NOTES
Infusion Nursing Note:  Walter Reyes presents today for cycle 13 day 1 cabazitaxel and carboplatin.    Patient seen by provider today: Yes: Yamilet Espinoza PA-C   present during visit today: Not Applicable.    Note: Pt comes to infusion today with no questions or concerns after provider visit. Pt wishes to proceed with today's planned treatment.    Intravenous Access:  Implanted Port.    Treatment Conditions:  Lab Results   Component Value Date    HGB 9.4 (L) 10/28/2024    WBC 8.4 10/28/2024    ANEU 5.0 07/07/2021    ANEUTAUTO 7.4 10/28/2024     (L) 10/28/2024     Lab Results   Component Value Date     10/28/2024    POTASSIUM 4.1 10/28/2024    MAG 1.9 08/25/2023    CR 0.89 10/28/2024    BETHANY 9.2 10/28/2024    BILITOTAL 0.4 10/28/2024    ALBUMIN 4.0 10/28/2024    ALT 20 10/28/2024    AST 32 10/28/2024     Results reviewed, labs MET treatment parameters, ok to proceed with treatment.    Post Infusion Assessment:  Patient tolerated infusion without incident.  Blood return noted pre and post infusion.  Site patent and intact, free from redness, edema or discomfort.  No evidence of extravasations.  Access discontinued per protocol.     Neulasta Onpro On-Body injector applied to left lower abdomen at 1345.  Writer discussed Neulasta injection would start tomorrow 10/29/24 at 1645, approximately 27 hours after application applied today.    Written and Verbal instruction reviewed with patient.  Pt instructed when the dose delivery starts, it will take about 45 minutes to complete.  Pt aware Neulasta Onpro On-Body should have green flashing light and to call triage or on-call MD if injector flashes red or appears to be leaking.   Pt aware to keep Onpro On-Body Neulasta 4 inches away from electrical equipment and to avoid showering 4 hours prior to injection.     Discharge Plan:   Prescription refills given for prednisone.  Discharge instructions reviewed with: Patient and Family.  Patient  and/or family verbalized understanding of discharge instructions and all questions answered.  AVS to patient via [employer].  Patient will return 11/18/24 for next appointment.   Patient discharged in stable condition accompanied by: spouse.  Departure Mode: Ambulatory.      Emily Meese, RN

## 2024-10-28 NOTE — NURSING NOTE
"Oncology Rooming Note    October 28, 2024 10:32 AM   Walter Reyes is a 64 year old male who presents for:    Chief Complaint   Patient presents with    Blood Draw     Port blood draw by lab RN    Oncology Clinic Visit     RTN Prostate CA     Initial Vitals: /74   Pulse 72   Temp 98.9  F (37.2  C) (Oral)   Resp 16   Wt 109.5 kg (241 lb 8 oz)   SpO2 95%   BMI 35.15 kg/m   Estimated body mass index is 35.15 kg/m  as calculated from the following:    Height as of 9/23/24: 1.765 m (5' 9.5\").    Weight as of this encounter: 109.5 kg (241 lb 8 oz). Body surface area is 2.32 meters squared.  Mild Pain (2) Comment: Left hip and right shoulder   No LMP for male patient.  Allergies reviewed: Yes  Medications reviewed: Yes    Medications: MEDICATION REFILLS NEEDED TODAY. Provider was NOT notified.  Pharmacy name entered into EPIC:    [facility] - [place], [place] - [address] [facility] - [place], [place] - [address] [place] [address] PHARMACY [place], [place] - [phone]  [facility] [place], [place] - [address] [facility] - [place], [place] - [address] [facility] [facility]    Frailty Screening:   Is the patient here for a new oncology consult visit in cancer care? 2. No      Clinical concerns: Request refill prednisone.      Aaron Gamez"

## 2024-10-28 NOTE — PATIENT INSTRUCTIONS
[facility] Triage and after hours / weekends / holidays:  [phone]    Please call the triage or after hours line if you experience a temperature greater than or equal to 100.4, shaking chills, have uncontrolled nausea, vomiting and/or diarrhea, dizziness, shortness of breath, chest pain, bleeding, unexplained bruising, or if you have any other new/concerning symptoms, questions or concerns.      If you are having any concerning symptoms or wish to speak to a provider before your next infusion visit, please call triage to notify them so we can adequately serve you.     If you need a refill on a narcotic prescription or other medication, please call before your infusion appointment.         October 2024 Sunday Monday Tuesday Wednesday Thursday Friday Saturday             1     2     3     4     5       6     7    LAB CENTRAL   8:00 AM   (15 min.)   [facility] DRAW   [facility]    RETURN CCSL   8:15 AM   (30 min.)   Omar Mcnair MD   [facility]    ONC INFUSION 2.5 HR (150 MIN)   9:00 AM   (150 min.)    ONC INFUSION NURSE   [facility] 8    ONC INFUSION 2.5 HR (150 MIN)   8:00 AM   (150 min.)    ONC INFUSION NURSE   [facility] 9     10     11     12       13     14     15     16     17     18     19       20     21     22     23     24     25     26       27     28    LAB CENTRAL   9:45 AM   (15 min.)   [facility] DRAW   [facility]    RETURN CCSL  10:15 AM   (45 min.)   Yamilet Espinoza PA-C   [facility]    ONC INFUSION 2.5 HR (150 MIN)  11:00 AM   (150 min.)    ONC INFUSION NURSE   [facility] 29 30 31 November 2024 Sunday Monday Tuesday Wednesday Thursday Friday Saturday                            1     2       3     4     5     6     7     8      9       10     11     12     13     14     15     16       17     18    LAB CENTRAL  10:15 AM   (15 min.)   [facility] LAB DRAW   [facility]    RETURN CCSL  10:45 AM   (45 min.)   Sherry Lee APRN CNP   [facility]    ONC INFUSION 2.5 HR (150 MIN)  12:30 PM   (150 min.)   [facility] ONC INFUSION NURSE   [facility] 19     20     21     22     23       24     25    INFUSION 2 HR (120 MIN)  12:00 PM   (120 min.)   [facility] INFUSION CHAIR   [facility] 26     27     28     29     30                     Lab Results:  Recent Results (from the past 12 hours)   PSA tumor marker    Collection Time: 10/28/24 10:20 AM   Result Value Ref Range    PSA Tumor Marker 24.36 (H) 0.00 - 4.50 ng/mL   Comprehensive metabolic panel    Collection Time: 10/28/24 10:20 AM   Result Value Ref Range    Sodium 141 135 - 145 mmol/L    Potassium 4.1 3.4 - 5.3 mmol/L    Carbon Dioxide (CO2) 23 22 - 29 mmol/L    Anion Gap 11 7 - 15 mmol/L    Urea Nitrogen 17.8 8.0 - 23.0 mg/dL    Creatinine 0.89 0.67 - 1.17 mg/dL    GFR Estimate >90 >60 mL/min/1.73m2    Calcium 9.2 8.8 - 10.4 mg/dL    Chloride 107 98 - 107 mmol/L    Glucose 136 (H) 70 - 99 mg/dL    Alkaline Phosphatase 132 40 - 150 U/L    AST 32 0 - 45 U/L    ALT 20 0 - 70 U/L    Protein Total 6.4 6.4 - 8.3 g/dL    Albumin 4.0 3.5 - 5.2 g/dL    Bilirubin Total 0.4 <=1.2 mg/dL   CBC with platelets and differential    Collection Time: 10/28/24 10:20 AM   Result Value Ref Range    WBC Count 8.4 4.0 - 11.0 10e3/uL    RBC Count 2.74 (L) 4.40 - 5.90 10e6/uL    Hemoglobin 9.4 (L) 13.3 - 17.7 g/dL    Hematocrit 29.6 (L) 40.0 - 53.0 %     (H) 78 - 100 fL    MCH 34.3 (H) 26.5 - 33.0 pg    MCHC 31.8 31.5 - 36.5 g/dL    RDW 18.1 (H) 10.0 - 15.0 %    Platelet Count 117 (L) 150 - 450 10e3/uL    % Neutrophils 88 %    % Lymphocytes 5 %    % Monocytes 6 %    % Eosinophils 0 %    % Basophils 0 %    %  Immature Granulocytes 1 %    NRBCs per 100 WBC 0 <1 /100    Absolute Neutrophils 7.4 1.6 - 8.3 10e3/uL    Absolute Lymphocytes 0.4 (L) 0.8 - 5.3 10e3/uL    Absolute Monocytes 0.5 0.0 - 1.3 10e3/uL    Absolute Eosinophils 0.0 0.0 - 0.7 10e3/uL    Absolute Basophils 0.0 0.0 - 0.2 10e3/uL    Absolute Immature Granulocytes 0.1 <=0.4 10e3/uL    Absolute NRBCs 0.0 10e3/uL

## 2024-10-28 NOTE — LETTER
10/28/2024      Walter Reyes  [address] [facility] [id number]      Dear Colleague,    Thank you for referring your patient, Walter Reyes, to the [facility]. Please see a copy of my visit note below.        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      INTERVAL HISTORY:   Walter is seen accompanied by his wife. He is doing well. [place] had a great time. Walter surprised himself and his wife with how much walking he was able to tolerate. He did have a fall on the trip where he missed a step on the stairs. He reports he did not get seriously injuried and caught himself. He does have mild left shoulder blade pain after the fall that improves with tylenol. His left hip is bothering him a little bit today which he believes is due to prolonged sitting on the plane. He continues on his fentanyl patch and oxycodone 5 mg at HS. He has not required a dose escalation of his oxycodone after his fall. No fevers or chills. Neuropathy has been gradually increasing over the course of cabzitaxel/carboplatin but no dramatic increases over the past few weeks. oes and bottom of the feet are most active sites for neuropathy. He has fatigue and nausea the first week after chemotherapy that slowly improves into the second and third week. He continues on Prednisone 10 mg in the AM and 5 mg in the evening (down from 10 mg BID). He continues acupuncture. He has been working on continued to get protein in his diet. No chest pain, shortness of breath, or cough.     Review of Systems:  Remainder of 12 point ROS reviewed and otherwise negative except as in interval history.       ONCOLOGY HISTORY: Mr. Walter Reyes is a 64 year old gentleman with a metastatic castration-sensitive prostate cancer and incidentally diagnosed stage 3 clear cell  "RCC (s/p radical R nephrectomy on 3/19/19) which is now 4.5+ years post-therapy without evidence of recurrence. His oncologic history is detailed below.     1. 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 Naylor at [facility]. Prior PSA were 1.4 on 8/16/17, 2.4 on 6/28/16, 2.9 on 6/28/16, and as low as 0.4 on 3/26/2003.   - 1/08/2019: Consultation with Sarah Wilson CNP in Urology clinic. Repeat PSA 9.6.  - 1/16/2019: MRI prostate with contrast - \"This examination is characterized as PIRADS 5- very high probability. Clinically significant cancer is highly likely to be present. There is a large, invasive mass arising from the right peripheral zone and extending into the neurovascular bundle, seminal vesicle, and along the anterolateral right mesorectal fascia. Metastatic right external iliac lymph node. Metastatic lesion in the posterior/superior right acetabulum.\"  - 1/25/2019: CT abdomen and pelvis with IV contrast - \"1. Heterogeneous enhancing mass posteriorly in the upper pole of the right kidney measures 4.5 x 5.8 x 5.7 cm (AP by transverse by craniocaudal). It has a small nodular component extending posterior medially which abuts the right psoas muscle. This nodular extension measures 2.1 x 2.1 cm. Minimal stranding about the mass. No definite thrombus within the right renal vein. This renal mass is compatible with a renal cell carcinoma. A paraaortic lymph node situated immediately posterior to the left renal vein measures 1.1 cm in short axis, suspicious for metastasis. 2. A 2 cm right external iliac lymph node is also suspicious for metastases. 3. Multiple sclerotic osseous lesions suspicious for metastases. These include 0.8 and 0.6 cm sclerotic lesions laterally in the right iliac wing (images 53 and 62 respectively). Ill-defined " "groundglass density laterally in the right acetabulum measuring 1.7 x 1.2 cm corresponds with the lesion identified on MRI. A 0.4 cm groundglass density in the left acetabulum. Sclerotic lesion in the left femoral neck measures 0.9 x 1.6 cm (image 81). Sclerotic metastases would be more compatible with prostate metastases. 4. A 3 subcentimeter hepatic lesions are indeterminate. Metastases would be a consideration. These can be further characterized with liver MRI.\"  - 1/25/2019: NM bone scan - \"There is focal bony uptake in the left femoral neck, right acetabulum, right of midline at the S1 or L5 level of the spine, within multiple bilateral ribs, in the C7 or T1 level of the spine, and anteriorly within the skull. Findings are suspicious for metastases.\"  - 1/31/19: CT chest with contrast - \" No suspicious nodules in the chest.  Stable appearance of a 5.8 cm right renal mass concerning for renal carcinoma until proven otherwise.  Stable indeterminant subcentimeter hypodensities in the liver.  Multifocal osteoblastic metastasis including 2.5 cm lesion in the right fifth rib posteriorly and a 1.6 cm lesion in the right third rib posteriorly. No lytic lesions identified.\"  - 2/6/19: CT-guided right sclerotic fifth rib lesion biopsy - \"Metastatic carcinoma, consistent with prostate primary.  Immunohistochemical stains performed show the metastatic carcinoma stains positive for NKX3.1 (prostate marker), negative for STACIE 3 and PAX8, which supports the above diagnosis.\"  - 2/15/19: Started Casodex 50mg every day and consented for the biobanking protocol. 3/18/19 - stopped Casodex.  - 2/19/19: Case discussed in tumor board - recommendation for nephectomy for suspected malignant right renal mass.   - 2/26/19: Started Lupron 22.5mg every 3 months.   - 5/8/19: Started Docetaxel 75mg/m2 IV every 3 weeks. 06/18/19 - cycle 3, 7/10/19 - cycle 4, 07/31/19 - cycle 5, 08/21/19 - cycle 6.   - 10/2/19: Restaging CT CAP and NM Bone " "Scan showed improved osseous disease, no evidence of recurrent or new metastatic disease.  - 1/5/20: Restaging CT CAP and NM Bone Scan showed stable osseous disease, no evidence of recurrent or new metastatic disease.  - 4/6/20: NM bone scan with slightly improved uptake in known skeletal mets. CT C/A/P with contrast with stable osseous mets, no yusuf enlargement, no new visceral mets etc.  - 04/08/20: Zometa every 3 months.  - 10/5/20: CT C/A/P with contrast and NM bone scan - SD.   - 1/4/21: CT C/A/P with contrast and NM bone scan - SD but slight increase in right 5th rib tracer uptake and in posterior L5 sclerosis. 1/6/21  PSA = 0.29  - 03/29/21: CT C/A/P with contrast - single new right sacral 8mm bone lesion (suspicious), other bone mets stable. No visceral/yusuf disease. Nephrectomy site without recurrence. NM bone scan - SD but \"increased uptake associated with the prior lesions of the lower cervical spine, posterior right fourth rib and right L5 posterior arch elements. Otherwise unchanged uptake associated with the proximal left femur and left anterior fourth rib. Similar uptake of the left halux, possibly secondary to degenerative osteoarthritis or gout.\"    3/31/21 PSA = 0.44  7/7/21  PSA = 0.47  11/2021 PSA = 1.05  -- Start XTANDI  12/16/21 PSA =0.22  2/11/22 PSA= 0.50  3/22/22 PSA=0.72  5/5/2022 PSA=1.79  7/11/2022 PSA=2.16 --Start cycle 1 docetaxel   8/22/22 PSA = 1.36  9/12/22 PSA = 1.09  10/24/22 Cycle #6 of Docetaxel. End of treatment  1/9/23  PSA =0.66  3/20/23  PSA=1.54  4/21/23 PSA = 1.81  T=15  5/22/23 C1 Pluvicto   06/07/23          PSA = 1.42  7/18/23 PSA=1.75, C2 Pluvicto   8/28/23 Transfer of care initial visit for Tabby.  PSA 2.06.  C3 Pluvicto scheduled for 8/30.  Proceed with dose as planned.  MR L femur, ortho referral, PSMA PET ordered for prior to follow-up.    10/2/23 Palliative RT to L femur metastasis complete.  PSMA PET with mixed response.  PSA 2.21.  RT to L femur pending.  " Continue with Dose #4 of Pluvicto despite mixed response to therapy.  Dex course for possible pain flare with RT.    11/8/23  Follow-up prior to Dose #5 Pluvicto.  More pain in chest/ribs/back. PSA 4.38.  Testosterone=3.  Rad onc referral for ribs.  Biopsy progressive lesion for progression on Pluvicto.  Cabazitaxel scheduled for follow-up appointment.  Tempus blood testing unrevealing for targetable mutation.    11/27/23 Bx completed from R iliac crest but unrevealing for either PCa or RCC.  Start cabazitaxel C1D1.  PSA 2.81.    12/19/23 PSA = 3.66  1/8/24 PSA = 3.55  1/29/24 PSA = 3.07  2/19/24 PSA= 2.21-Start carboplatin/cabazitaxel.    3/11/24 PSA= C2 Carbo/cabazi.  PSA 1.99.    4/01/24 PSA= 1.74. C3 Carbo/cabazi.   4/22/24 PSA= 1.85. C4 Carbo/cabazi.   5/13/24 PSA = 2.06. C5 Carbo/cabazi.   6/3/24 PSA = 2.68, cycle 6 Carboplatin/cabazitaxel  6/24/24 PSA 3.51, C7 carboplatin/cabazitaxel.  Bony lesions stable on NM bone scan.  Reticulonodular densities noted in lungs as possible infection vs drug reaction.  Add chromogranin A to next labs.   7/15/24 PSA= 5.07, chromogranin A pending, cycle 8  8/5/24 PSA= 7.12, Cycle 9 carboplatin/cabazitaxel  8/26/24 PSA= 9.88, cycle 10 carbo/cabazitaxel  9/16/24 PSA= 16.53, cycle 11 carbo/cabazitaxel. Dose reduce carboplatin from AUC 4 to AUC 3 due to fatigue, anorexia, and peripheral neuropathy.   10/7/24 PSA= 19.  Cycle 12 carbo/cabazitaxel given on 10/8/2024.   10/28/24 PSA pending. Cycle 13 carboplatin/cabazitaxel.     Radiation history:   -C7         3,000 cGy       06 X      8/05/2021        8/18/2021        13       10  -2 Sacrum          2,500 cGy       18 X      4/12/2022        4/18/2022         6           -Sacrum retreat               700 cGy        18 X      2/28/2023        2/28/2023  -Left femur to 2000 cGy in 5 fractions, completed 10/10/2023   -bilateral posterior chest wall at an area of osseous involvement of the ribs and adjacent T4 and 5 spine, to be  "delivered to 2000 cGy in 5 fractions.  completed 12/13-12/19/23.    2. Stage III ([id number]), grade 3 of 4, clear-cell RCC of right kidney:  - Incidentally diagnosed as above.   - Underwent curative-intent robotic right radical nephrectomy with Dr. Wesley Cleveland on 3/19/19. No tumor spillage per op report.   - Path showed: \"Histologic Type: Clear cell renal cell carcinoma; Sarcomatoid Features: Not identified; Histologic Grade: Nucleolar grade 3 (WHO/ISUP). Extent Tumor Size: 4.3 cm. Microscopic Tumor Extension: Tumor extension into renal sinus (in vascular structures). Margins: Negative. Tumor Necrosis: Present; focal. Lymph-Vascular Invasion: Not identified.  Pathologic Staging (pTNM) Primary Tumor (pT): pT3a: Tumor extends into renal vein branches/renal sinus. Regional Lymph Nodes (pN): pNX. Number of Lymph Nodes Examined: 0 Distant Metastasis (pM): pM N/A.\"  - Restaging scans as above.  No current concerns for recurrence.      PAST MEDICAL HISTORY:  Past Medical History:   Diagnosis Date     Cancer of kidney, right (H)      Complication of anesthesia     slow wakeup      Malignant neoplasm of prostate metastatic to bone (H) 2/14/2019     Thyroid disease      CURRENT MEDICATIONS:   Current Outpatient Medications:      atorvastatin (LIPITOR) 80 MG tablet, Take 80 mg by mouth daily, Disp: , Rfl:      BERBERINE CHLORIDE PO, , Disp: , Rfl:      calcium citrate-vitamin D (CITRACAL) 315-250 MG-UNIT TABS per tablet, Take 500 mg by mouth 2 times daily With 800 Vd per pt, Disp: , Rfl:      cycloSPORINE (RESTASIS) 0.05 % ophthalmic emulsion, Place 1 drop into both eyes 2 times daily , Disp: , Rfl:      dexAMETHasone (DECADRON) 4 MG tablet, Take 2 tablets (8 mg) by mouth daily Take for 3 days, starting the day after chemo. Take with food., Disp: 6 tablet, Rfl: 2     DULoxetine (CYMBALTA) 60 MG capsule, Take 1 capsule (60 mg) by mouth daily., Disp: 90 capsule, Rfl: 1     fentaNYL (DURAGESIC) 25 mcg/hr 72 hr patch, Place 1 " patch onto the skin every 72 hours. Patient on vacation in [place], forgot fentanyl patches at home in [place]. Has prostate cancer with cancer pain. I talked with Cammie PARISI Pharmacist at [facility]. Remove old patch when placing new patch., Disp: 5 patch, Rfl: 0     furosemide (LASIX) 20 MG tablet, Take 1 tablet (20 mg) by mouth daily (Patient not taking: Reported on 9/16/2024), Disp: 15 tablet, Rfl: 1     gabapentin (NEURONTIN) 300 MG capsule, Take 1 capsule (300 mg) by mouth every morning AND 2 capsules (600 mg) daily at 2 pm AND 2 capsules (600 mg) at bedtime., Disp: 150 capsule, Rfl: 3     ivermectin (STROMECTOL) 3 MG TABS tablet, , Disp: , Rfl:      levothyroxine (SYNTHROID/LEVOTHROID) 112 MCG tablet, Take 112 mcg by mouth daily, Disp: , Rfl:      loratadine (CLARITIN) 10 MG tablet, Take 10 mg by mouth daily, Disp: , Rfl:      Multiple Vitamins-Minerals (MULTIVITAMIN ADULT EXTRA C PO), Take 1 tablet by mouth every 24 hours, Disp: , Rfl:      naloxone (NARCAN) 4 MG/0.1ML nasal spray, Spray 1 spray (4 mg) into one nostril alternating nostrils as needed for opioid reversal every 2-3 minutes until assistance arrives, Disp: 0.2 mL, Rfl: 1     Nutritional Supplements (SALMON OIL) CAPS, Take 2 capsules by mouth daily , Disp: , Rfl:      omeprazole (PRILOSEC) 20 MG DR capsule, Take 20 mg by mouth daily, Disp: , Rfl:      ondansetron (ZOFRAN) 8 MG tablet, Take 1 tablet (8 mg) by mouth every 8 hours as needed for nausea (vomiting), Disp: 30 tablet, Rfl: 2     oxyCODONE (ROXICODONE) 5 MG tablet, Take 1-2 tablets (5-10 mg) by mouth every 3 hours as needed for severe pain., Disp: 90 tablet, Rfl: 0     predniSONE (DELTASONE) 5 MG tablet, Take 1-2 tablets (5-10 mg) by mouth 2 times daily (with meals) for 21 days., Disp: 84 tablet, Rfl: 0     prochlorperazine (COMPAZINE) 10 MG tablet, Take 1 tablet (10 mg) by mouth every 6 hours as needed for nausea or vomiting, Disp: 30 tablet, Rfl: 5     tamsulosin (FLOMAX) 0.4 MG  capsule, Take 1 capsule (0.4 mg) by mouth daily, Disp: 30 capsule, Rfl: 3      Physical Exam:   /74   Pulse 72   Temp 98.9  F (37.2  C) (Oral)   Resp 16   Wt 109.5 kg (241 lb 8 oz)   SpO2 95%   BMI 35.15 kg/m    Wt Readings from Last 10 Encounters:   10/28/24 109.5 kg (241 lb 8 oz)   10/07/24 109.2 kg (240 lb 11.2 oz)   09/23/24 108.9 kg (240 lb)   09/16/24 109.5 kg (241 lb 6.4 oz)   08/26/24 113.1 kg (249 lb 6.4 oz)   08/05/24 112 kg (247 lb)   07/24/24 113.4 kg (250 lb)   07/15/24 113.9 kg (251 lb)   07/03/24 114.8 kg (253 lb)   06/24/24 114.7 kg (252 lb 14.4 oz)   General: The patient is a pleasant male in no acute distress.  HEENT: EOMI. Sclerae are anicteric. Mucous membranes moist.    Lymph: Neck is supple. No gross adenopathy.   Heart: No JVD or edema.   Lungs: normal work of breathing on RA. No audible wheezing or rhonchi.   Abdomen:  non-distended.    Extremities: trace bilateral lower extremity edema, nonpitting edema from thigh through lower leg in left leg.   MSK: no tenderness to palpation along the spine and left scapula. Ambulates onto the exam table without assistance.      Neuro: Cranial nerves II through XII are grossly intact.  Skin: No rashes, petechiae, or bruising noted on exposed skin.  Psych: affect bright, alert and oriented    LABORATORY DATA:  Most Recent 3 CBC's:  Recent Labs   Lab Test 10/28/24  1020 10/07/24  0815 09/16/24  1110   WBC 8.4 7.5 9.6   HGB 9.4* 10.2* 10.2*   * 108* 108*   * 129* 141*   ANEUTAUTO 7.4 5.9 7.6     Most Recent 3 BMP's:  Recent Labs   Lab Test 10/28/24  1020 10/07/24  0815 09/16/24  1110    139 142   POTASSIUM 4.1 4.1 3.8   CHLORIDE 107 103 107   CO2 23 25 22   BUN 17.8 15.0 17.0   CR 0.89 0.88 1.07   ANIONGAP 11 11 13   BETHANY 9.2 9.0 8.6*   * 105* 114*   PROTTOTAL 6.4 6.5 6.3*   ALBUMIN 4.0 4.0 3.8    Most Recent 3 LFT's:  Recent Labs   Lab Test 10/28/24  1020 10/07/24  0815 09/16/24  1110   AST 32 26 26   ALT 20 28 24  "  ALKPHOS 132 119 112   BILITOTAL 0.4 0.3 0.3     PSA trend, see oncology history.      ASSESSMENT & PLAN: Mr. Reyes is a 64 year old gentleman with metastatic castration sensitive prostate adenocarcinoma as well as an incidentally diagnosed stage III clear-cell renal cell carcinoma of the right kidney (s/p radical R nephrectomy 3/19/19).     1. Metastatic castration-resistant prostate cancer, with involvement of the bones and RPLN:   - Patient met the criteria for CHAARTED \"high-volume\" metastatic hormone-sensitive prostate cancer. He opted for docetaxel in combination with ADT (per CHAARTED, [id number], STAMPEDE). He was subsequently treated with docetaxel x6 doses in the CRPC setting and enzalutamide.  He initiated Pluvicto in May 2023 with an initial slight decline in PSA, but this began to rise just prior to Cycle 3.  Given his XR evidence of progression in L femur and PSA rise, we re-evaluated his progression with PSMA PET scan in September 2023, with note of mixed response.  Canceled Pluvicto Dose for November due to symptomatic progression with PSA rise. PSA is labile. Biopsy results from 11/21 was unrevealing for evaluation of NEPC vs small cell given progression without a significant rise in PSA. Tempus peripheral blood mutation analysis is unrevealing for targetable mutations. Cabazitaxel started 11/27/23 given progression on Pluvicto. Carboplatin added 2/2024 and cabazitaxel dose reduced by 20% in April 2024 due to neuropathy.  Restaging with pulmonary consolidative findings without mass-like features, which had improved on October 2024 restaging imaging. If neuropathy remains intolerable, we could consider addition of Ra-223 and enzalutamide per PEACE-3 results, although this was for a first-line CRPC patient subset without prior ART therapy.  Notably, he has not received ART therapy in more than 3 years at the time of our October 2024. This does require continued administration of " antiresorptive therapy.    -Lupron every 3 months, next due 12/30/24.    -Cabazitaxel to be continued at 20% dose reduction. Further reduced carboplatin to an AUC of 3 due to fatigue, anorexia, and worsening peripheral neuropathy in September 2024. Will continue at same dose today,10/28/24.    -He has tapered prednisone from 10 mg BID to 10 mg in the AM and 5 mg in the PM. Will continue to revisit tapering further to 5 mg BID.  - CBC and CMP generally stable and notable for ongoing mild anemia and thrombocytopenia.     2. Pressure in chest, resolved:  Has an ongoing sensation of pressure/need to cough in his chest, primarily against his sternum. Vital signs stable, negative pulmonary physical exam. No chest pain. No recurrence 10/28/24.     3. Bone metastases:    - Noted to have osseous metastatic disease at the time of diagnosis. S/p radiation to C spine and sacrum (Re-irradiation to sacrum).   - pain mgmt per palliative care, pain is now well controlled with fentanyl patch. Dexamethasone burst is helpful for pain flares and he has these available as needed.    -Will increase Prednisone to 5-10mg daily (can increase to 10mg dose as needed for pain)  - RT to L femur 10/2023.  RT for bilateral posterior ribs 12/2023 with Dr. Albert.  - Encouraged to continue calcium and vitamin D supplementation; continue Zometa 4mg IV every ~3 months.   - Last Zometa given 8/26/2024. Next due 11/2024.   - Left hip and shoulder pain on 10/28/24 after fall and traveling. Continue to monitor closely. Managed with tylenol at 10/28/24. Discussed if pain fails to improve Walter should let us know so that we can further investigate.      4. Stage 3, UISS-high risk ccRCC: s/p R nephrectomy   - Nephrectomy 3/19/19 and noted incidentally.  He is now 5+ years post-op without evidence for recurrence.   - At this point, there is a minimal risk of recurrence of his RCC given the time since surgery without the use of adjuvant immunotherapy. There  is no suspicious adenopathy or other features on his most recent imaging studies.    - Will continue to monitor with imaging planned for prostate cancer.    - not specifically discussed 10/28/24.     5. Sensory neuropathy, Toes/feet Grade 2  - continue monitor for worsening with cabazitaxel.   - Carboplatin dose reduced to AUC of 3 in September of 2024.    - Continue acupuncture once weekly, has decreased to every 3 weeks but finds benefit and plans to increase back to weekly after travels.   - He is on gabapentin and duloxetine managed by palliative care   -duloxetine dose recently increased with further improvement in neuropathy. Tried weaning off gabapentin and pain in back and ribs was uncontrolled again. Continue duloxetine and gabapentin per palliative.      6. Anemia  - likely secondary to chemotherapy. Iron levels adequate on 4/22/24. MCV elevated.  Folate and B12 adequate.  Retic appropriately elevated.  Stable 10/28/24    7. Left lower extremity swelling   - US 4/22/24 negative for DVT. CT abdomen pelvis 4/29 without significant or new lymphadenopathy. Improved lately with new compression garments. Continue compression stocking. Following with lymphedema therapy.      Plan:   Proceed with cycle 13 carboplatin/cabazitaxel today 10/28/24. RTC in three weeks with next dose. Monitor left hip and left scapular pain.     33 minutes spent on the date of the encounter doing chart review, review of test results, interpretation of tests, patient visit, and documentation     Yamilet Espinoza PA-C      Again, thank you for allowing me to participate in the care of your patient.        Sincerely,        Yamilet Espinoza PA-C

## 2024-10-30 LAB — TESTOST SERPL-MCNC: <2 NG/DL (ref 240–950)

## 2024-11-04 ENCOUNTER — MYC MEDICAL ADVICE (OUTPATIENT)
Dept: ONCOLOGY | Facility: CLINIC | Age: 64
End: 2024-11-04
Payer: COMMERCIAL

## 2024-11-04 NOTE — TELEPHONE ENCOUNTER
Oncology Nurse Triage - Pain  Situation: Walter reporting the following symptoms: Pain    Background:   Treating Provider: Omar Mcnair MD    Date of last office visit: 10/28/24    Recent Treatments:10/28/24: cycle 13 day 1 cabazitaxel and carboplatin.      Assessment:     Location: To the right of Left shoulder blade, just to the L side of spine.   Onset started worsening slowly and progressively since he saw Yamilet. There is no bruise where it is hurting. He had a tumble  which Yamilet knows about and has examined him for.    Duration/Frequency: continuous, worsens toward end of day and calms by the morning. He reports that it is annoying. He took two oxy to get to sleep last night because he just couldn't get to sleep. He was able to sleep and by morning it calmed down. Now feels it when he moves his shoulders, but otherwise doesn't feel it.   Rates: 1/10 now; during night was 4/10 before he took the oxycodone.  Quality: dull and achy, is not sharp and does not take his breath away.  Current med(s) used: Takes in tablet oxycodone at night. On 25 mcg fent patch. He usually takes one tab of oxy, except for last night took two tabs.  Worsens or relieves with: Worse in the evenings, with some movement.    Other sx:  Constipation: Taking stool softeners(Colace 2 in 1) and miralax; has not been constipated this week.  Nausea--taking compazine, works well for him.  Eating--appetite is not there. Trying to eat.  Fluids--wife encourages fluids. He's doing best he can.   Some fatigue.  Had a cough for a while that came on Friday. Is gone now.   Denies fevers/chills, cough, sore throat, chest pain, SOB, headache, v, bowel & bladder issues, abdominal pain, rash, new or increased bleeding or worsening fatigue.      Recommendations:   If constipation persists, he can keep taking Miralax and add Senna 1-4 tabs BID, start with one tab BID instead of colace.  Push fluids. Small frequent meals.  Monitor pain and call back if sx  worsen.  Continue taking pain meds as needed/prescribed.    Informed caller that this writer will forward to Care Team and if they have further advice, they will let me know and I will call pt back.  Pt voiced understanding of all of the above information and is in agreement with this plan.    Routed to Care Team.

## 2024-11-05 NOTE — TELEPHONE ENCOUNTER
"Call to pt, reviewed recommendations for supportive cares and offered XR to evaluate for fracture. Pt declined XR at this time, said he thinks if he had a fracture, he would feel more shooting pains. He states pain last night and today has been \"a little better\". Writer advised pt call if he changes his mind about XR. Pt voiced understanding.   "

## 2024-11-11 DIAGNOSIS — G89.3 CANCER ASSOCIATED PAIN: ICD-10-CM

## 2024-11-11 DIAGNOSIS — C61 PROSTATE CANCER (H): ICD-10-CM

## 2024-11-11 DIAGNOSIS — M54.10 RADICULOPATHY, UNSPECIFIED SPINAL REGION: ICD-10-CM

## 2024-11-11 RX ORDER — GABAPENTIN 300 MG/1
CAPSULE ORAL
Qty: 150 CAPSULE | Refills: 3 | Status: SHIPPED | OUTPATIENT
Start: 2024-11-11

## 2024-11-11 RX ORDER — FENTANYL 25 UG/1
1 PATCH TRANSDERMAL
Qty: 10 PATCH | Refills: 0 | Status: SHIPPED | OUTPATIENT
Start: 2024-11-11

## 2024-11-11 NOTE — TELEPHONE ENCOUNTER
Received [employer] message from patient requesting refill of fentanyl and gabapentin.    Last refill of fentanyl: 10/7/24  Last refill of gabapentin: 10/9/24  Last office visit: 9/23/24  Scheduled for follow up 1/2/25     Will route request to NP for review.     Reviewed [place]  Report.

## 2024-11-12 ENCOUNTER — MYC MEDICAL ADVICE (OUTPATIENT)
Dept: ONCOLOGY | Facility: CLINIC | Age: 64
End: 2024-11-12
Payer: COMMERCIAL

## 2024-11-12 NOTE — TELEPHONE ENCOUNTER
"Oncology Nurse Triage - Reporting Symptoms  Situation:   Walter reporting via [facility] message: numb lower lip on right side of chin. Triage RN call to pt to follow up.     Background:   Treating Provider:   Dr. Omar Mcnair     Date of last office visit: 10/28/24 w/ Yamilet Espinoza     Recent treatments: Yes: 10/28/24 cycle 13 day 1 cabazitaxel and carboplatin.      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 Sensation is not going away since it started.   Not effecting speech or swallowing. Only feels when he touches it, said if feels like \"novocain numbness\".    Denies swelling, denies numbness on inside of mouth or tongue. He does have neuropathy in hands and feet- takes gabapentin.   Denies fevers, chest pain, SOB, difficulties going to the bathroom, signs of bleeding.     Recommendations:   Reviewed common side effect of cabazitaxel and carboplatin are numbness/tingling, unsure if new numbness of chin is related to his previous neuropathy. Informed will send message to care team to review and contact him with recommendations. Pt voiced understanding.     1400 call to pt, reviewed recommendations to hold/stop ivermectin, as this can cause neurologic changes. Pt open to this. He will send a message in later this week to update on symptoms. Writer encouraged him to report any new or worsening symptoms prior if needed. Pt voiced understanding.   "

## 2024-11-18 ENCOUNTER — APPOINTMENT (OUTPATIENT)
Dept: LAB | Facility: CLINIC | Age: 64
End: 2024-11-18
Attending: STUDENT IN AN ORGANIZED HEALTH CARE EDUCATION/TRAINING PROGRAM
Payer: COMMERCIAL

## 2024-11-18 ENCOUNTER — ANCILLARY PROCEDURE (OUTPATIENT)
Dept: MRI IMAGING | Facility: CLINIC | Age: 64
End: 2024-11-18
Payer: COMMERCIAL

## 2024-11-18 ENCOUNTER — MYC MEDICAL ADVICE (OUTPATIENT)
Dept: PALLIATIVE MEDICINE | Facility: CLINIC | Age: 64
End: 2024-11-18

## 2024-11-18 ENCOUNTER — ONCOLOGY VISIT (OUTPATIENT)
Dept: ONCOLOGY | Facility: CLINIC | Age: 64
End: 2024-11-18
Attending: STUDENT IN AN ORGANIZED HEALTH CARE EDUCATION/TRAINING PROGRAM
Payer: COMMERCIAL

## 2024-11-18 ENCOUNTER — ANCILLARY PROCEDURE (OUTPATIENT)
Dept: CT IMAGING | Facility: CLINIC | Age: 64
End: 2024-11-18
Payer: COMMERCIAL

## 2024-11-18 VITALS
RESPIRATION RATE: 16 BRPM | TEMPERATURE: 98.2 F | DIASTOLIC BLOOD PRESSURE: 81 MMHG | HEART RATE: 95 BPM | BODY MASS INDEX: 34.79 KG/M2 | OXYGEN SATURATION: 96 % | WEIGHT: 239 LBS | SYSTOLIC BLOOD PRESSURE: 118 MMHG

## 2024-11-18 DIAGNOSIS — C61 MALIGNANT NEOPLASM OF PROSTATE METASTATIC TO BONE (H): ICD-10-CM

## 2024-11-18 DIAGNOSIS — G89.3 CANCER ASSOCIATED PAIN: ICD-10-CM

## 2024-11-18 DIAGNOSIS — C79.51 MALIGNANT NEOPLASM OF PROSTATE METASTATIC TO BONE (H): ICD-10-CM

## 2024-11-18 DIAGNOSIS — I49.9 IRREGULAR HEART RATE: Primary | ICD-10-CM

## 2024-11-18 PROCEDURE — 99213 OFFICE O/P EST LOW 20 MIN: CPT

## 2024-11-18 PROCEDURE — 250N000011 HC RX IP 250 OP 636

## 2024-11-18 PROCEDURE — 93005 ELECTROCARDIOGRAM TRACING: CPT

## 2024-11-18 RX ORDER — IOPAMIDOL 755 MG/ML
117 INJECTION, SOLUTION INTRAVASCULAR ONCE
Status: COMPLETED | OUTPATIENT
Start: 2024-11-18 | End: 2024-11-18

## 2024-11-18 RX ORDER — GADOBUTROL 604.72 MG/ML
10 INJECTION INTRAVENOUS ONCE
Status: COMPLETED | OUTPATIENT
Start: 2024-11-18 | End: 2024-11-18

## 2024-11-18 RX ORDER — HEPARIN SODIUM (PORCINE) LOCK FLUSH IV SOLN 100 UNIT/ML 100 UNIT/ML
500 SOLUTION INTRAVENOUS ONCE
Status: COMPLETED | OUTPATIENT
Start: 2024-11-18 | End: 2024-11-18

## 2024-11-18 RX ORDER — ACETAMINOPHEN 325 MG/1
325-650 TABLET ORAL EVERY 6 HOURS PRN
COMMUNITY

## 2024-11-18 RX ORDER — HEPARIN SODIUM (PORCINE) LOCK FLUSH IV SOLN 100 UNIT/ML 100 UNIT/ML
5 SOLUTION INTRAVENOUS ONCE
Status: COMPLETED | OUTPATIENT
Start: 2024-11-18 | End: 2024-11-18

## 2024-11-18 RX ADMIN — HEPARIN 5 ML: 100 SYRINGE at 10:10

## 2024-11-18 RX ADMIN — IOPAMIDOL 117 ML: 755 INJECTION, SOLUTION INTRAVASCULAR at 13:21

## 2024-11-18 RX ADMIN — GADOBUTROL 10 ML: 604.72 INJECTION INTRAVENOUS at 12:52

## 2024-11-18 RX ADMIN — HEPARIN SODIUM (PORCINE) LOCK FLUSH IV SOLN 100 UNIT/ML 500 UNITS: 100 SOLUTION at 13:26

## 2024-11-18 ASSESSMENT — PAIN SCALES - GENERAL: PAINLEVEL_OUTOF10: MILD PAIN (2)

## 2024-11-18 NOTE — PROGRESS NOTES
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      INTERVAL HISTORY:     Walter presents today accompanied by his wife Micheline.   -he has had an increase in fatigue this last cycle, sleeping a lot more even during the last week when he was off from chemo.   -neuropathy is stable, continues acupuncture once weekly   -having increased pain in multiple locations: under left shoulder blade, bilateral hips, forehead and low back. Also having new pain in his right clavicle.   -most of his pain is not severe, however it often worsens at night and interferes with sleep, taking 2 oxycodone at night if needed  -his left shoulderblade is the most painful spot, the pain radiates around his ribs to the front   -facial numbness has improved slightly. The edges of the numb area are starting to itch in the last couple of days. Had a burning sensation to the whole numb area yesterday, under his lip to his chin/jaw bone on the right half of his face   -had right eye drooping a few days ago, this has improved and now only has mild swelling to the right upper eyelid. No vision changes but has had pain behind his right eye. Feels different than what he would describe as a headache which are typically a throbbing sensation, this pain behind his right eye has been a constant pain  -continues to have dizziness/lightheaded when he stands but has gradually worsened over the last 6-8 weeks   -eating and drinking well, no GI concerns, no urinary issues   -stopped Ivermectin on 11/13   -continues on prednisone 10mg in AM, 5mg PM       Review of Systems:  Remainder of 12 point ROS reviewed and otherwise negative except as in interval history.       ONCOLOGY HISTORY: Mr. Walter Reyes is a 64 year old gentleman with a metastatic castration-sensitive prostate cancer and incidentally diagnosed stage 3 clear cell RCC (s/p radical R nephrectomy on 3/19/19)  "which is now 4.5+ years post-therapy without evidence of recurrence. His oncologic history is detailed below.     1. 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 Naylor at [facility]. Prior PSA were 1.4 on 8/16/17, 2.4 on 6/28/16, 2.9 on 6/28/16, and as low as 0.4 on 3/26/2003.   - 1/08/2019: Consultation with Sarah Wilson CNP in Urology clinic. Repeat PSA 9.6.  - 1/16/2019: MRI prostate with contrast - \"This examination is characterized as PIRADS 5- very high probability. Clinically significant cancer is highly likely to be present. There is a large, invasive mass arising from the right peripheral zone and extending into the neurovascular bundle, seminal vesicle, and along the anterolateral right mesorectal fascia. Metastatic right external iliac lymph node. Metastatic lesion in the posterior/superior right acetabulum.\"  - 1/25/2019: CT abdomen and pelvis with IV contrast - \"1. Heterogeneous enhancing mass posteriorly in the upper pole of the right kidney measures 4.5 x 5.8 x 5.7 cm (AP by transverse by craniocaudal). It has a small nodular component extending posterior medially which abuts the right psoas muscle. This nodular extension measures 2.1 x 2.1 cm. Minimal stranding about the mass. No definite thrombus within the right renal vein. This renal mass is compatible with a renal cell carcinoma. A paraaortic lymph node situated immediately posterior to the left renal vein measures 1.1 cm in short axis, suspicious for metastasis. 2. A 2 cm right external iliac lymph node is also suspicious for metastases. 3. Multiple sclerotic osseous lesions suspicious for metastases. These include 0.8 and 0.6 cm sclerotic lesions laterally in the right iliac wing (images 53 and 62 respectively). Ill-defined groundglass density laterally in the right " "acetabulum measuring 1.7 x 1.2 cm corresponds with the lesion identified on MRI. A 0.4 cm groundglass density in the left acetabulum. Sclerotic lesion in the left femoral neck measures 0.9 x 1.6 cm (image 81). Sclerotic metastases would be more compatible with prostate metastases. 4. A 3 subcentimeter hepatic lesions are indeterminate. Metastases would be a consideration. These can be further characterized with liver MRI.\"  - 1/25/2019: NM bone scan - \"There is focal bony uptake in the left femoral neck, right acetabulum, right of midline at the S1 or L5 level of the spine, within multiple bilateral ribs, in the C7 or T1 level of the spine, and anteriorly within the skull. Findings are suspicious for metastases.\"  - 1/31/19: CT chest with contrast - \" No suspicious nodules in the chest.  Stable appearance of a 5.8 cm right renal mass concerning for renal carcinoma until proven otherwise.  Stable indeterminant subcentimeter hypodensities in the liver.  Multifocal osteoblastic metastasis including 2.5 cm lesion in the right fifth rib posteriorly and a 1.6 cm lesion in the right third rib posteriorly. No lytic lesions identified.\"  - 2/6/19: CT-guided right sclerotic fifth rib lesion biopsy - \"Metastatic carcinoma, consistent with prostate primary.  Immunohistochemical stains performed show the metastatic carcinoma stains positive for NKX3.1 (prostate marker), negative for STACIE 3 and PAX8, which supports the above diagnosis.\"  - 2/15/19: Started Casodex 50mg every day and consented for the biobanking protocol. 3/18/19 - stopped Casodex.  - 2/19/19: Case discussed in tumor board - recommendation for nephectomy for suspected malignant right renal mass.   - 2/26/19: Started Lupron 22.5mg every 3 months.   - 5/8/19: Started Docetaxel 75mg/m2 IV every 3 weeks. 06/18/19 - cycle 3, 7/10/19 - cycle 4, 07/31/19 - cycle 5, 08/21/19 - cycle 6.   - 10/2/19: Restaging CT CAP and NM Bone Scan showed improved osseous disease, no " "evidence of recurrent or new metastatic disease.  - 1/5/20: Restaging CT CAP and NM Bone Scan showed stable osseous disease, no evidence of recurrent or new metastatic disease.  - 4/6/20: NM bone scan with slightly improved uptake in known skeletal mets. CT C/A/P with contrast with stable osseous mets, no yusuf enlargement, no new visceral mets etc.  - 04/08/20: Zometa every 3 months.  - 10/5/20: CT C/A/P with contrast and NM bone scan - SD.   - 1/4/21: CT C/A/P with contrast and NM bone scan - SD but slight increase in right 5th rib tracer uptake and in posterior L5 sclerosis. 1/6/21  PSA = 0.29  - 03/29/21: CT C/A/P with contrast - single new right sacral 8mm bone lesion (suspicious), other bone mets stable. No visceral/yusuf disease. Nephrectomy site without recurrence. NM bone scan - SD but \"increased uptake associated with the prior lesions of the lower cervical spine, posterior right fourth rib and right L5 posterior arch elements. Otherwise unchanged uptake associated with the proximal left femur and left anterior fourth rib. Similar uptake of the left halux, possibly secondary to degenerative osteoarthritis or gout.\"    3/31/21 PSA = 0.44  7/7/21  PSA = 0.47  11/2021 PSA = 1.05  -- Start XTANDI  12/16/21 PSA =0.22  2/11/22 PSA= 0.50  3/22/22 PSA=0.72  5/5/2022 PSA=1.79  7/11/2022 PSA=2.16 --Start cycle 1 docetaxel   8/22/22 PSA = 1.36  9/12/22 PSA = 1.09  10/24/22 Cycle #6 of Docetaxel. End of treatment  1/9/23  PSA =0.66  3/20/23  PSA=1.54  4/21/23 PSA = 1.81  T=15  5/22/23 C1 Pluvicto   06/07/23          PSA = 1.42  7/18/23 PSA=1.75, C2 Pluvicto   8/28/23 Transfer of care initial visit for Tabby.  PSA 2.06.  C3 Pluvicto scheduled for 8/30.  Proceed with dose as planned.  MR L femur, ortho referral, PSMA PET ordered for prior to follow-up.    10/2/23 Palliative RT to L femur metastasis complete.  PSMA PET with mixed response.  PSA 2.21.  RT to L femur pending.  Continue with Dose #4 of Pluvicto despite " mixed response to therapy.  Dex course for possible pain flare with RT.    11/8/23  Follow-up prior to Dose #5 Pluvicto.  More pain in chest/ribs/back. PSA 4.38.  Testosterone=3.  Rad onc referral for ribs.  Biopsy progressive lesion for progression on Pluvicto.  Cabazitaxel scheduled for follow-up appointment.  Tempus blood testing unrevealing for targetable mutation.    11/27/23 Bx completed from R iliac crest but unrevealing for either PCa or RCC.  Start cabazitaxel C1D1.  PSA 2.81.    12/19/23 PSA = 3.66  1/8/24 PSA = 3.55  1/29/24 PSA = 3.07  2/19/24 PSA= 2.21-Start carboplatin/cabazitaxel.    3/11/24 PSA= C2 Carbo/cabazi.  PSA 1.99.    4/01/24 PSA= 1.74. C3 Carbo/cabazi.   4/22/24 PSA= 1.85. C4 Carbo/cabazi.   5/13/24 PSA = 2.06. C5 Carbo/cabazi.   6/3/24 PSA = 2.68, cycle 6 Carboplatin/cabazitaxel  6/24/24 PSA 3.51, C7 carboplatin/cabazitaxel.  Bony lesions stable on NM bone scan.  Reticulonodular densities noted in lungs as possible infection vs drug reaction.  Add chromogranin A to next labs.   7/15/24 PSA= 5.07, chromogranin A pending, cycle 8  8/5/24 PSA= 7.12, Cycle 9 carboplatin/cabazitaxel  8/26/24 PSA= 9.88, cycle 10 carbo/cabazitaxel  9/16/24 PSA= 16.53, cycle 11 carbo/cabazitaxel. Dose reduce carboplatin from AUC 4 to AUC 3 due to fatigue, anorexia, and peripheral neuropathy.   10/7/24 PSA= 19.  Cycle 12 carbo/cabazitaxel given on 10/8/2024.   10/28/24 PSA= 24.36. Cycle 13 carboplatin/cabazitaxel.   11/18/24 PSA= 26, Cycle 14 carboplatin/cabazitaxel- HELD     Radiation history:   -C7         3,000 cGy       06 X      8/05/2021        8/18/2021        13       10  -2 Sacrum          2,500 cGy       18 X      4/12/2022        4/18/2022         6           -Sacrum retreat               700 cGy        18 X      2/28/2023        2/28/2023  -Left femur to 2000 cGy in 5 fractions, completed 10/10/2023   -bilateral posterior chest wall at an area of osseous involvement of the ribs and adjacent T4 and 5  "spine, to be delivered to 2000 cGy in 5 fractions.  completed 12/13-12/19/23.    2. Stage III ([id number]), grade 3 of 4, clear-cell RCC of right kidney:  - Incidentally diagnosed as above.   - Underwent curative-intent robotic right radical nephrectomy with Dr. Wesley Cleveland on 3/19/19. No tumor spillage per op report.   - Path showed: \"Histologic Type: Clear cell renal cell carcinoma; Sarcomatoid Features: Not identified; Histologic Grade: Nucleolar grade 3 (WHO/ISUP). Extent Tumor Size: 4.3 cm. Microscopic Tumor Extension: Tumor extension into renal sinus (in vascular structures). Margins: Negative. Tumor Necrosis: Present; focal. Lymph-Vascular Invasion: Not identified.  Pathologic Staging (pTNM) Primary Tumor (pT): pT3a: Tumor extends into renal vein branches/renal sinus. Regional Lymph Nodes (pN): pNX. Number of Lymph Nodes Examined: 0 Distant Metastasis (pM): pM N/A.\"  - Restaging scans as above.  No current concerns for recurrence.      PAST MEDICAL HISTORY:  Past Medical History:   Diagnosis Date    Cancer of kidney, right (H)     Complication of anesthesia     slow wakeup     Malignant neoplasm of prostate metastatic to bone (H) 2/14/2019    Thyroid disease      CURRENT MEDICATIONS:   Current Outpatient Medications:     acetaminophen (TYLENOL) 325 MG tablet, Take 325-650 mg by mouth every 6 hours as needed for mild pain. Extra strength, Disp: , Rfl:     atorvastatin (LIPITOR) 80 MG tablet, Take 40 mg by mouth daily., Disp: , Rfl:     BERBERINE CHLORIDE PO, , Disp: , Rfl:     calcium citrate-vitamin D (CITRACAL) 315-250 MG-UNIT TABS per tablet, Take 500 mg by mouth 2 times daily With 800 Vd per pt, Disp: , Rfl:     cycloSPORINE (RESTASIS) 0.05 % ophthalmic emulsion, Place 1 drop into both eyes 2 times daily , Disp: , Rfl:     dexAMETHasone (DECADRON) 4 MG tablet, Take 2 tablets (8 mg) by mouth daily Take for 3 days, starting the day after chemo. Take with food. (Patient taking differently: Take 8 mg by mouth " as needed. Take for 3 days, starting the day after chemo. Take with food.), Disp: 6 tablet, Rfl: 2    DULoxetine (CYMBALTA) 60 MG capsule, Take 1 capsule (60 mg) by mouth daily., Disp: 90 capsule, Rfl: 1    fentaNYL (DURAGESIC) 25 mcg/hr 72 hr patch, Place 1 patch onto the skin every 72 hours. Remove old patch when placing new patch., Disp: 10 patch, Rfl: 0    gabapentin (NEURONTIN) 300 MG capsule, Take 1 capsule (300 mg) by mouth every morning AND 2 capsules (600 mg) daily at 2 pm AND 2 capsules (600 mg) at bedtime., Disp: 150 capsule, Rfl: 3    levothyroxine (SYNTHROID/LEVOTHROID) 112 MCG tablet, Take 112 mcg by mouth daily, Disp: , Rfl:     loratadine (CLARITIN) 10 MG tablet, Take 10 mg by mouth daily, Disp: , Rfl:     Multiple Vitamins-Minerals (MULTIVITAMIN ADULT EXTRA C PO), Take 1 tablet by mouth every 24 hours, Disp: , Rfl:     naloxone (NARCAN) 4 MG/0.1ML nasal spray, Spray 1 spray (4 mg) into one nostril alternating nostrils as needed for opioid reversal every 2-3 minutes until assistance arrives, Disp: 0.2 mL, Rfl: 1    Nutritional Supplements (SALMON OIL) CAPS, Take 2 capsules by mouth daily , Disp: , Rfl:     omeprazole (PRILOSEC) 20 MG DR capsule, Take 20 mg by mouth daily, Disp: , Rfl:     ondansetron (ZOFRAN) 8 MG tablet, Take 1 tablet (8 mg) by mouth every 8 hours as needed for nausea (vomiting), Disp: 30 tablet, Rfl: 2    oxyCODONE (ROXICODONE) 5 MG tablet, Take 1-2 tablets (5-10 mg) by mouth every 3 hours as needed for severe pain., Disp: 90 tablet, Rfl: 0    prochlorperazine (COMPAZINE) 10 MG tablet, Take 1 tablet (10 mg) by mouth every 6 hours as needed for nausea or vomiting, Disp: 30 tablet, Rfl: 5    tamsulosin (FLOMAX) 0.4 MG capsule, Take 1 capsule (0.4 mg) by mouth daily, Disp: 30 capsule, Rfl: 3    dexAMETHasone (DECADRON) 4 MG tablet, Take 2 tablets (8 mg) by mouth 2 times daily (with meals)., Disp: 56 tablet, Rfl: 0    furosemide (LASIX) 20 MG tablet, Take 1 tablet (20 mg) by mouth  daily (Patient not taking: Reported on 9/16/2024), Disp: 15 tablet, Rfl: 1    ivermectin (STROMECTOL) 3 MG TABS tablet, , Disp: , Rfl:       Physical Exam:   /81 (BP Location: Left arm, Patient Position: Sitting, Cuff Size: Adult Large)   Pulse 95   Temp 98.2  F (36.8  C) (Oral)   Resp 16   Wt 108.4 kg (239 lb)   SpO2 96%   BMI 34.79 kg/m    Wt Readings from Last 10 Encounters:   11/18/24 108.4 kg (239 lb)   10/28/24 109.5 kg (241 lb 8 oz)   10/07/24 109.2 kg (240 lb 11.2 oz)   09/23/24 108.9 kg (240 lb)   09/16/24 109.5 kg (241 lb 6.4 oz)   08/26/24 113.1 kg (249 lb 6.4 oz)   08/05/24 112 kg (247 lb)   07/24/24 113.4 kg (250 lb)   07/15/24 113.9 kg (251 lb)   07/03/24 114.8 kg (253 lb)   General: The patient is a pleasant male in no acute distress.  HEENT: EOMI. Mild swelling to right upper eyelid. PERRLA. Sclerae are anicteric. Mucous membranes moist.    Lymph: Neck is supple. No gross adenopathy.   Heart: No JVD or edema. Regular rhythm and rate with frequent irregular beat.   Lungs: normal work of breathing on RA. No audible wheezing or rhonchi.   Abdomen:  non-distended.  Bowel sounds present x4, nontender   Extremities: trace bilateral lower extremity edema, nonpitting edema from thigh through lower leg in left leg.   MSK: no tenderness to palpation along the spine and left scapula. Ambulates onto the exam table without assistance.      Neuro: Cranial nerves II through XII are grossly intact.  Skin: No rashes, petechiae, or bruising noted on exposed skin.  Psych: affect bright, alert and oriented    LABORATORY DATA:  Most Recent 3 CBC's:  Recent Labs   Lab Test 11/18/24  1020 10/28/24  1020 10/07/24  0815   WBC 5.5 8.4 7.5   HGB 9.0* 9.4* 10.2*   * 108* 108*   * 117* 129*   ANEUTAUTO 4.2 7.4 5.9     Most Recent 3 BMP's:  Recent Labs   Lab Test 11/18/24  1020 10/28/24  1020 10/07/24  0815    141 139   POTASSIUM 3.6 4.1 4.1   CHLORIDE 108* 107 103   CO2 24 23 25   BUN 16.2 17.8  "15.0   CR 0.87 0.89 0.88   ANIONGAP 10 11 11   BETHANY 8.9 9.2 9.0   * 136* 105*   PROTTOTAL 6.4 6.4 6.5   ALBUMIN 3.7 4.0 4.0    Most Recent 3 LFT's:  Recent Labs   Lab Test 11/18/24  1020 10/28/24  1020 10/07/24  0815   AST 32 32 26   ALT 16 20 28   ALKPHOS 165* 132 119   BILITOTAL 0.2 0.4 0.3     PSA trend, see oncology history.      ASSESSMENT & PLAN: Mr. Reyes is a 64 year old gentleman with metastatic castration sensitive prostate adenocarcinoma as well as an incidentally diagnosed stage III clear-cell renal cell carcinoma of the right kidney (s/p radical R nephrectomy 3/19/19).     Metastatic castration-resistant prostate cancer, with involvement of the bones and RPLN:   - Patient met the criteria for CHAARTED \"high-volume\" metastatic hormone-sensitive prostate cancer. He opted for docetaxel in combination with ADT (per JOSEFA, [id number], STAMPEDE). He was subsequently treated with docetaxel x6 doses in the [facility] setting and enzalutamide.  He initiated Pluvicto in May 2023 with an initial slight decline in PSA, but this began to rise just prior to Cycle 3.  Given his XR evidence of progression in L femur and PSA rise, we re-evaluated his progression with PSMA PET scan in September 2023, with note of mixed response.  Canceled Pluvicto Dose for November due to symptomatic progression with PSA rise. PSA is labile. Biopsy results from 11/21 was unrevealing for evaluation of NEPC vs small cell given progression without a significant rise in PSA. Tempus peripheral blood mutation analysis is unrevealing for targetable mutations. Cabazitaxel started 11/27/23 given progression on Pluvicto. Carboplatin added 2/2024 and cabazitaxel dose reduced by 20% in April 2024 due to neuropathy.  Restaging with pulmonary consolidative findings without mass-like features, which had improved on October 2024 restaging imaging. If neuropathy remains intolerable, we could consider addition of Ra-223 and enzalutamide per " PEACE-3 results, although this was for a first-line CRPC patient subset without prior ART therapy.  Notably, he has not received ART therapy in more than 3 years at the time of our October 2024. This does require continued administration of antiresorptive therapy.    -Lupron every 3 months, next due 12/30/24.    -Cabazitaxel to be continued at 20% dose reduction. Further reduced carboplatin to an AUC of 3 due to fatigue, anorexia, and worsening peripheral neuropathy in September 2024.   -He has tapered prednisone from 10 mg BID to 10 mg in the AM and 5 mg in the PM.   -CBC and CMP generally stable and notable for ongoing mild anemia and thrombocytopenia.   -Increased pain and new sites of pain in right eye and clavicle, along with neurological changes in the face that are ongoing since stopping Ivermectin. HOLD chemotherapy today. MR brain with orbits and CT CAP ordered.   -Imaging demonstrating progression including enhancing bone lesions in the frontal and right parietal bones as well as pachymeningeal enhancement concerning for mets. Discussed with Dr. Mcnair, will move bone scan up ASAP and add Chromogranin A labs to consider neuroendocrine etiology. Will schedule follow up with Dr. Mcnair ASAP once testing is completed.   -Called and discussed scan results with Supriya. Gave instructions to continue monitoring symptoms and notify triage or present to ED with any neurological changes or concerns.     Facial numbness, eye pain.   Stopped Ivermectin 11/13 but symptoms continue. Facial numbness between bottom lip and chin on right side, sometimes having itching or burning sensation. New pain behind right eye with improved swelling/drooping. MR brain obtained, see above.     Pressure in chest, resolved:  Has an ongoing sensation of pressure/need to cough in his chest, primarily against his sternum. Vital signs stable, negative pulmonary physical exam. No chest pain. No recurrence 11/18/24.     Bone metastases:     - Noted to have osseous metastatic disease at the time of diagnosis. S/p radiation to C spine and sacrum (Re-irradiation to sacrum).   - pain mgmt per palliative care, pain is now well controlled with fentanyl patch. Dexamethasone burst is helpful for pain flares and he has these available as needed.    -Will increase Prednisone to 5-10mg daily (can increase to 10mg dose as needed for pain)  - RT to L femur 10/2023.  RT for bilateral posterior ribs 12/2023 with Dr. Albert.  - Encouraged to continue calcium and vitamin D supplementation; continue Zometa 4mg IV every ~3 months.   - Last Zometa given 8/26/2024. Next due 11/2024. Scheduled for infusion next week at [facility].   - Increased pain in forehead, hips, back especially left posterior shoulder and new pain in right clavicle. MR brain demonstrating progression in skull mets, will schedule bone scan be moved up ASAP.      Stage 3, UISS-high risk ccRCC: s/p R nephrectomy   - Nephrectomy 3/19/19 and noted incidentally.  He is now 5+ years post-op without evidence for recurrence.   - At this point, there is a minimal risk of recurrence of his RCC given the time since surgery without the use of adjuvant immunotherapy. There is no suspicious adenopathy or other features on his most recent imaging studies.    - Will continue to monitor with imaging planned for prostate cancer.    - not specifically discussed today (11/18/24).     Sensory neuropathy, Toes/feet Grade 2  - continue monitor for worsening with cabazitaxel.   - Carboplatin dose reduced to AUC of 3 in September of 2024.    - Continue acupuncture once weekly, has decreased to every 3 weeks but finds benefit and plans to increase back to weekly after travels.   - He is on gabapentin and duloxetine managed by palliative care   -duloxetine dose recently increased with further improvement in neuropathy. Tried weaning off gabapentin and pain in back and ribs was uncontrolled again. Continue duloxetine and  gabapentin per palliative.      Anemia  - likely secondary to chemotherapy. Iron levels adequate on 4/22/24. MCV elevated.  Folate and B12 adequate.  Retic appropriately elevated.  Stable 11/18/24.    Left lower extremity swelling   - US 4/22/24 negative for DVT. CT abdomen pelvis 4/29 without significant or new lymphadenopathy. Improved lately with new compression garments. Continue compression stocking. Following with lymphedema therapy.    The longitudinal plan of care for the diagnosis(es)/condition(s) as documented were addressed during this visit. Due to the added complexity in care, I will continue to support Walter in the subsequent management and with ongoing continuity of care.      70 minutes spent on the date of the encounter doing chart review, review of test results, interpretation of tests, patient visit, and documentation       LATESHA Humphreys CNP

## 2024-11-18 NOTE — DISCHARGE INSTRUCTIONS

## 2024-11-18 NOTE — DISCHARGE INSTRUCTIONS
MRI Contrast Discharge Instructions    The IV contrast you received today will pass out of your body in your  urine. This will happen in the next 24 hours. You will not feel this process.  Your urine will not change color.    Drink at least 4 extra glasses of water or juice today (unless your doctor  has restricted your fluids). This reduces the stress on your kidneys.  You may take your regular medicines.    If you are on dialysis: It is best to have dialysis today.    If you have a reaction: Most reactions happen right away. If you have  any new symptoms after leaving the hospital (such as hives or swelling),  call your hospital at the correct number below. Or call your family doctor.  If you have breathing distress or wheezing, call 911.    Special instructions: ***    I have read and understand the above information.    Signature:______________________________________ Date:___________    Staff:__________________________________________ Date:___________     Time:__________    [facility] Departments:    ___[place]: [phone]  ___[facility]: [phone]  ___[place]: [phone] ___[facility]: [phone]  ___[facility]: [phone]  ___[facility]: [phone]  ___[phone]  ___[facility]: [phone]  ___[phone]

## 2024-11-18 NOTE — Clinical Note
11/18/2024      Walter Reyes  [id number]  [facility] [id number]      Dear Colleague,    Thank you for referring your patient, Watler Reyes, to the [facility]. Please see a copy of my visit note below.        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      INTERVAL HISTORY:     Walter presents today accompanied by his wife Micheline.   -he has had an increase in fatigue this last cycle, sleeping a lot more even during the last week when he was off from chemo.   -neuropathy is stable, continues acupuncture once weekly   -having increased pain in multiple locations: under left shoulder blade, bilateral hips, forehead and low back. Also having new pain in his right clavicle.   More spots of pain- not severe, but often interfering with sleep, taking 2 oxy at night if needed,   -new spot on right collarbone, under left shoulder blade bothers the most with some radiating pain around ribs to the front, hip joints get sore, when all pain is bad low back isn't bad, but when everything else calms down the low back flares - had a couple of pain flares the last week that were lalo bothersome   -left hip pain eventually radiated down into femur   -forehead feels bruised all the way across,   -facial numbness improved slightly, edges are starting to itch, had burning sensatio yesterday, right chin the whole half from lip under  -right eye drooping has improved  -has had a  headache behind right eye, started having before discontinued ivermectin, wasn't throbbing just a continuous pain   -stoppped ivermectin on 11/13     -shoulder is always there but mld and then ramps up at night        -dizzy/lightheaded if he stands and moves too quickly, last 6-8 weeks gotten a little more   -      -no GI or urinary concerns      ***  Walter is seen accompanied by his wife. He is doing well. [place] went  [place] had a great time. Walter surprised himself and his wife with how much walking he was able to tolerate. He did have a fall on the trip where he missed a step on the stairs. He reports he did not get seriously injuried and caught himself. He does have mild left shoulder blade pain after the fall that improves with tylenol. His left hip is bothering him a little bit today which he believes is due to prolonged sitting on the plane. He continues on his fentanyl patch and oxycodone 5 mg at HS. He has not required a dose escalation of his oxycodone after his fall. No fevers or chills. Neuropathy has been gradually increasing over the course of cabzitaxel/carboplatin but no dramatic increases over the past few weeks. oes and bottom of the feet are most active sites for neuropathy. He has fatigue and nausea the first week after chemotherapy that slowly improves into the second and third week. He continues on Prednisone 10 mg in the AM and 5 mg in the evening (down from 10 mg BID). He continues acupuncture. He has been working on continued to get protein in his diet. No chest pain, shortness of breath, or cough.     Review of Systems:  Remainder of 12 point ROS reviewed and otherwise negative except as in interval history.       ONCOLOGY HISTORY: Mr. 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 His oncologic history is detailed below.     1. 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 Naylor at [facility]. Prior PSA were 1.4 on 8/16/17, 2.4 on 6/28/16, 2.9 on 6/28/16, and as low as 0.4 on  "3/26/2003.   - 1/08/2019: Consultation with Sarah Wilson CNP in Urology clinic. Repeat PSA 9.6.  - 1/16/2019: MRI prostate with contrast - \"This examination is characterized as PIRADS 5- very high probability. Clinically significant cancer is highly likely to be present. There is a large, invasive mass arising from the right peripheral zone and extending into the neurovascular bundle, seminal vesicle, and along the anterolateral right mesorectal fascia. Metastatic right external iliac lymph node. Metastatic lesion in the posterior/superior right acetabulum.\"  - 1/25/2019: CT abdomen and pelvis with IV contrast - \"1. Heterogeneous enhancing mass posteriorly in the upper pole of the right kidney measures 4.5 x 5.8 x 5.7 cm (AP by transverse by craniocaudal). It has a small nodular component extending posterior medially which abuts the right psoas muscle. This nodular extension measures 2.1 x 2.1 cm. Minimal stranding about the mass. No definite thrombus within the right renal vein. This renal mass is compatible with a renal cell carcinoma. A paraaortic lymph node situated immediately posterior to the left renal vein measures 1.1 cm in short axis, suspicious for metastasis. 2. A 2 cm right external iliac lymph node is also suspicious for metastases. 3. Multiple sclerotic osseous lesions suspicious for metastases. These include 0.8 and 0.6 cm sclerotic lesions laterally in the right iliac wing (images 53 and 62 respectively). Ill-defined groundglass density laterally in the right acetabulum measuring 1.7 x 1.2 cm corresponds with the lesion identified on MRI. A 0.4 cm groundglass density in the left acetabulum. Sclerotic lesion in the left femoral neck measures 0.9 x 1.6 cm (image 81). Sclerotic metastases would be more compatible with prostate metastases. 4. A 3 subcentimeter hepatic lesions are indeterminate. Metastases would be a consideration. These can be further characterized with liver MRI.\"  - 1/25/2019: NM " "bone scan - \"There is focal bony uptake in the left femoral neck, right acetabulum, right of midline at the S1 or L5 level of the spine, within multiple bilateral ribs, in the C7 or T1 level of the spine, and anteriorly within the skull. Findings are suspicious for metastases.\"  - 1/31/19: CT chest with contrast - \" No suspicious nodules in the chest.  Stable appearance of a 5.8 cm right renal mass concerning for renal carcinoma until proven otherwise.  Stable indeterminant subcentimeter hypodensities in the liver.  Multifocal osteoblastic metastasis including 2.5 cm lesion in the right fifth rib posteriorly and a 1.6 cm lesion in the right third rib posteriorly. No lytic lesions identified.\"  - 2/6/19: CT-guided right sclerotic fifth rib lesion biopsy - \"Metastatic carcinoma, consistent with prostate primary.  Immunohistochemical stains performed show the metastatic carcinoma stains positive for NKX3.1 (prostate marker), negative for STACIE 3 and PAX8, which supports the above diagnosis.\"  - 2/15/19: Started Casodex 50mg every day and consented for the biobanking protocol. 3/18/19 - stopped Casodex.  - 2/19/19: Case discussed in tumor board - recommendation for nephectomy for suspected malignant right renal mass.   - 2/26/19: Started Lupron 22.5mg every 3 months.   - 5/8/19: Started Docetaxel 75mg/m2 IV every 3 weeks. 06/18/19 - cycle 3, 7/10/19 - cycle 4, 07/31/19 - cycle 5, 08/21/19 - cycle 6.   - 10/2/19: Restaging CT CAP and NM Bone Scan showed improved osseous disease, no evidence of recurrent or new metastatic disease.  - 1/5/20: Restaging CT CAP and NM Bone Scan showed stable osseous disease, no evidence of recurrent or new metastatic disease.  - 4/6/20: NM bone scan with slightly improved uptake in known skeletal mets. CT C/A/P with contrast with stable osseous mets, no yusuf enlargement, no new visceral mets etc.  - 04/08/20: Zometa every 3 months.  - 10/5/20: CT C/A/P with contrast and NM bone scan - SD. " "  - 1/4/21: CT C/A/P with contrast and NM bone scan - SD but slight increase in right 5th rib tracer uptake and in posterior L5 sclerosis. 1/6/21  PSA = 0.29  - 03/29/21: CT C/A/P with contrast - single new right sacral 8mm bone lesion (suspicious), other bone mets stable. No visceral/yusuf disease. Nephrectomy site without recurrence. NM bone scan - SD but \"increased uptake associated with the prior lesions of the lower cervical spine, posterior right fourth rib and right L5 posterior arch elements. Otherwise unchanged uptake associated with the proximal left femur and left anterior fourth rib. Similar uptake of the left halux, possibly secondary to degenerative osteoarthritis or gout.\"    3/31/21 PSA = 0.44  7/7/21  PSA = 0.47  11/2021 PSA = 1.05  -- Start XTANDI  12/16/21 PSA =0.22  2/11/22 PSA= 0.50  3/22/22 PSA=0.72  5/5/2022 PSA=1.79  7/11/2022 PSA=2.16 --Start cycle 1 docetaxel   8/22/22 PSA = 1.36  9/12/22 PSA = 1.09  10/24/22 Cycle #6 of Docetaxel. End of treatment  1/9/23  PSA =0.66  3/20/23  PSA=1.54  4/21/23 PSA = 1.81  T=15  5/22/23 C1 Pluvicto   06/07/23          PSA = 1.42  7/18/23 PSA=1.75, C2 Pluvicto   8/28/23 Transfer of care initial visit for Tabby.  PSA 2.06.  C3 Pluvicto scheduled for 8/30.  Proceed with dose as planned.  MR L femur, ortho referral, PSMA PET ordered for prior to follow-up.    10/2/23 Palliative RT to L femur metastasis complete.  PSMA PET with mixed response.  PSA 2.21.  RT to L femur pending.  Continue with Dose #4 of Pluvicto despite mixed response to therapy.  Dex course for possible pain flare with RT.    11/8/23  Follow-up prior to Dose #5 Pluvicto.  More pain in chest/ribs/back. PSA 4.38.  Testosterone=3.  Rad onc referral for ribs.  Biopsy progressive lesion for progression on Pluvicto.  Cabazitaxel scheduled for follow-up appointment.  Tempus blood testing unrevealing for targetable mutation.    11/27/23 Bx completed from R iliac crest but unrevealing for either PCa or " "RCC.  Start cabazitaxel C1D1.  PSA 2.81.    12/19/23 PSA = 3.66  1/8/24 PSA = 3.55  1/29/24 PSA = 3.07  2/19/24 PSA= 2.21-Start carboplatin/cabazitaxel.    3/11/24 PSA= C2 Carbo/cabazi.  PSA 1.99.    4/01/24 PSA= 1.74. C3 Carbo/cabazi.   4/22/24 PSA= 1.85. C4 Carbo/cabazi.   5/13/24 PSA = 2.06. C5 Carbo/cabazi.   6/3/24 PSA = 2.68, cycle 6 Carboplatin/cabazitaxel  6/24/24 PSA 3.51, C7 carboplatin/cabazitaxel.  Bony lesions stable on NM bone scan.  Reticulonodular densities noted in lungs as possible infection vs drug reaction.  Add chromogranin A to next labs.   7/15/24 PSA= 5.07, chromogranin A pending, cycle 8  8/5/24 PSA= 7.12, Cycle 9 carboplatin/cabazitaxel  8/26/24 PSA= 9.88, cycle 10 carbo/cabazitaxel  9/16/24 PSA= 16.53, cycle 11 carbo/cabazitaxel. Dose reduce carboplatin from AUC 4 to AUC 3 due to fatigue, anorexia, and peripheral neuropathy.   10/7/24 PSA= 19.  Cycle 12 carbo/cabazitaxel given on 10/8/2024.   10/28/24 PSA= 24.36. Cycle 13 carboplatin/cabazitaxel.   11/18/24 PSA= 26, Cycle 14 carboplatin/cabazitaxel- HELD     Radiation history:   -C7         3,000 cGy       06 X      8/05/2021        8/18/2021        13       10  -2 Sacrum          2,500 cGy       18 X      4/12/2022        4/18/2022         6           -Sacrum retreat               700 cGy        18 X      2/28/2023        2/28/2023  -Left femur to 2000 cGy in 5 fractions, completed 10/10/2023   -bilateral posterior chest wall at an area of osseous involvement of the ribs and adjacent T4 and 5 spine, to be delivered to 2000 cGy in 5 fractions.  completed 12/13-12/19/23.    2. Stage III ([id number]), grade 3 of 4, clear-cell RCC of right kidney:  - Incidentally diagnosed as above.   - Underwent curative-intent robotic right radical nephrectomy with Dr. Wesley Cleveland on 3/19/19. No tumor spillage per op report.   - Path showed: \"Histologic Type: Clear cell renal cell carcinoma; Sarcomatoid Features: Not identified; Histologic Grade: Nucleolar " "grade 3 (WHO/ISUP). Extent Tumor Size: 4.3 cm. Microscopic Tumor Extension: Tumor extension into renal sinus (in vascular structures). Margins: Negative. Tumor Necrosis: Present; focal. Lymph-Vascular Invasion: Not identified.  Pathologic Staging (pTNM) Primary Tumor (pT): pT3a: Tumor extends into renal vein branches/renal sinus. Regional Lymph Nodes (pN): pNX. Number of Lymph Nodes Examined: 0 Distant Metastasis (pM): pM N/A.\"  - Restaging scans as above.  No current concerns for recurrence.      PAST MEDICAL HISTORY:  Past Medical History:   Diagnosis Date    Cancer of kidney, right (H)     Complication of anesthesia     slow wakeup     Malignant neoplasm of prostate metastatic to bone (H) 2/14/2019    Thyroid disease      CURRENT MEDICATIONS:   Current Outpatient Medications:     atorvastatin (LIPITOR) 80 MG tablet, Take 80 mg by mouth daily, Disp: , Rfl:     BERBERINE CHLORIDE PO, , Disp: , Rfl:     calcium citrate-vitamin D (CITRACAL) 315-250 MG-UNIT TABS per tablet, Take 500 mg by mouth 2 times daily With 800 Vd per pt, Disp: , Rfl:     cycloSPORINE (RESTASIS) 0.05 % ophthalmic emulsion, Place 1 drop into both eyes 2 times daily , Disp: , Rfl:     dexAMETHasone (DECADRON) 4 MG tablet, Take 2 tablets (8 mg) by mouth daily Take for 3 days, starting the day after chemo. Take with food., Disp: 6 tablet, Rfl: 2    DULoxetine (CYMBALTA) 60 MG capsule, Take 1 capsule (60 mg) by mouth daily., Disp: 90 capsule, Rfl: 1    fentaNYL (DURAGESIC) 25 mcg/hr 72 hr patch, Place 1 patch onto the skin every 72 hours. Remove old patch when placing new patch., Disp: 10 patch, Rfl: 0    furosemide (LASIX) 20 MG tablet, Take 1 tablet (20 mg) by mouth daily (Patient not taking: Reported on 9/16/2024), Disp: 15 tablet, Rfl: 1    gabapentin (NEURONTIN) 300 MG capsule, Take 1 capsule (300 mg) by mouth every morning AND 2 capsules (600 mg) daily at 2 pm AND 2 capsules (600 mg) at bedtime., Disp: 150 capsule, Rfl: 3    ivermectin " (STROMECTOL) 3 MG TABS tablet, , Disp: , Rfl:     levothyroxine (SYNTHROID/LEVOTHROID) 112 MCG tablet, Take 112 mcg by mouth daily, Disp: , Rfl:     loratadine (CLARITIN) 10 MG tablet, Take 10 mg by mouth daily, Disp: , Rfl:     Multiple Vitamins-Minerals (MULTIVITAMIN ADULT EXTRA C PO), Take 1 tablet by mouth every 24 hours, Disp: , Rfl:     naloxone (NARCAN) 4 MG/0.1ML nasal spray, Spray 1 spray (4 mg) into one nostril alternating nostrils as needed for opioid reversal every 2-3 minutes until assistance arrives, Disp: 0.2 mL, Rfl: 1    Nutritional Supplements (SALMON OIL) CAPS, Take 2 capsules by mouth daily , Disp: , Rfl:     omeprazole (PRILOSEC) 20 MG DR capsule, Take 20 mg by mouth daily, Disp: , Rfl:     ondansetron (ZOFRAN) 8 MG tablet, Take 1 tablet (8 mg) by mouth every 8 hours as needed for nausea (vomiting), Disp: 30 tablet, Rfl: 2    oxyCODONE (ROXICODONE) 5 MG tablet, Take 1-2 tablets (5-10 mg) by mouth every 3 hours as needed for severe pain., Disp: 90 tablet, Rfl: 0    predniSONE (DELTASONE) 5 MG tablet, Take 1-2 tablets (5-10 mg) by mouth 2 times daily (with meals) for 21 days., Disp: 84 tablet, Rfl: 0    prochlorperazine (COMPAZINE) 10 MG tablet, Take 1 tablet (10 mg) by mouth every 6 hours as needed for nausea or vomiting, Disp: 30 tablet, Rfl: 5    tamsulosin (FLOMAX) 0.4 MG capsule, Take 1 capsule (0.4 mg) by mouth daily, Disp: 30 capsule, Rfl: 3      Physical Exam:   There were no vitals taken for this visit.  Wt Readings from Last 10 Encounters:   10/28/24 109.5 kg (241 lb 8 oz)   10/07/24 109.2 kg (240 lb 11.2 oz)   09/23/24 108.9 kg (240 lb)   09/16/24 109.5 kg (241 lb 6.4 oz)   08/26/24 113.1 kg (249 lb 6.4 oz)   08/05/24 112 kg (247 lb)   07/24/24 113.4 kg (250 lb)   07/15/24 113.9 kg (251 lb)   07/03/24 114.8 kg (253 lb)   06/24/24 114.7 kg (252 lb 14.4 oz)   General: The patient is a pleasant male in no acute distress.  HEENT: EOMI. Sclerae are anicteric. Mucous membranes moist.   "  Lymph: Neck is supple. No gross adenopathy.   Heart: No JVD or edema. ***  Lungs: normal work of breathing on RA. No audible wheezing or rhonchi.   Abdomen:  non-distended.    Extremities: trace bilateral lower extremity edema, nonpitting edema from thigh through lower leg in left leg.   MSK: no tenderness to palpation along the spine and left scapula. Ambulates onto the exam table without assistance.      Neuro: Cranial nerves II through XII are grossly intact.  Skin: No rashes, petechiae, or bruising noted on exposed skin.  Psych: affect bright, alert and oriented    LABORATORY DATA:  Most Recent 3 CBC's:  Recent Labs   Lab Test 10/28/24  1020 10/07/24  0815 09/16/24  1110   WBC 8.4 7.5 9.6   HGB 9.4* 10.2* 10.2*   * 108* 108*   * 129* 141*   ANEUTAUTO 7.4 5.9 7.6     Most Recent 3 BMP's:  Recent Labs   Lab Test 10/28/24  1020 10/07/24  0815 09/16/24  1110    139 142   POTASSIUM 4.1 4.1 3.8   CHLORIDE 107 103 107   CO2 23 25 22   BUN 17.8 15.0 17.0   CR 0.89 0.88 1.07   ANIONGAP 11 11 13   BETHANY 9.2 9.0 8.6*   * 105* 114*   PROTTOTAL 6.4 6.5 6.3*   ALBUMIN 4.0 4.0 3.8    Most Recent 3 LFT's:  Recent Labs   Lab Test 10/28/24  1020 10/07/24  0815 09/16/24  1110   AST 32 26 26   ALT 20 28 24   ALKPHOS 132 119 112   BILITOTAL 0.4 0.3 0.3     PSA trend, see oncology history.  ***    ASSESSMENT & PLAN: Mr. Reyes is a 64 year old gentleman with metastatic castration sensitive prostate adenocarcinoma as well as an incidentally diagnosed stage III clear-cell renal cell carcinoma of the right kidney (s/p radical R nephrectomy 3/19/19).     1. Metastatic castration-resistant prostate cancer, with involvement of the bones and RPLN:   - Patient met the criteria for CHAARTED \"high-volume\" metastatic hormone-sensitive prostate cancer. He opted for docetaxel in combination with ADT (per JOSEFA, [id number], KARLA). He was subsequently treated with docetaxel x6 doses in the CRPC setting and " enzalutamide.  He initiated Pluvicto in May 2023 with an initial slight decline in PSA, but this began to rise just prior to Cycle 3.  Given his XR evidence of progression in L femur and PSA rise, we re-evaluated his progression with PSMA PET scan in September 2023, with note of mixed response.  Canceled Pluvicto Dose for November due to symptomatic progression with PSA rise. PSA is labile. Biopsy results from 11/21 was unrevealing for evaluation of NEPC vs small cell given progression without a significant rise in PSA. Tempus peripheral blood mutation analysis is unrevealing for targetable mutations. Cabazitaxel started 11/27/23 given progression on Pluvicto. Carboplatin added 2/2024 and cabazitaxel dose reduced by 20% in April 2024 due to neuropathy.  Restaging with pulmonary consolidative findings without mass-like features, which had improved on October 2024 restaging imaging. If neuropathy remains intolerable, we could consider addition of Ra-223 and enzalutamide per PEACE-3 results, although this was for a first-line CRPC patient subset without prior ART therapy.  Notably, he has not received ART therapy in more than 3 years at the time of our October 2024. This does require continued administration of antiresorptive therapy.    -Lupron every 3 months, next due 12/30/24.    -Cabazitaxel to be continued at 20% dose reduction. Further reduced carboplatin to an AUC of 3 due to fatigue, anorexia, and worsening peripheral neuropathy in September 2024. Will continue at same dose today,*** 10/28/24.    -He has tapered prednisone from 10 mg BID to 10 mg in the AM and 5 mg in the PM. Will continue to revisit tapering further to 5 mg BID.  - CBC and CMP generally stable and notable for ongoing mild anemia and thrombocytopenia.     2. Pressure in chest, resolved:  Has an ongoing sensation of pressure/need to cough in his chest, primarily against his sternum. Vital signs stable, negative pulmonary physical exam. No chest  pain. No recurrence 10/28/24.     3. Bone metastases:    - Noted to have osseous metastatic disease at the time of diagnosis. S/p radiation to C spine and sacrum (Re-irradiation to sacrum).   - pain mgmt per palliative care, pain is now well controlled with fentanyl patch. Dexamethasone burst is helpful for pain flares and he has these available as needed.    -Will increase Prednisone to 5-10mg daily (can increase to 10mg dose as needed for pain)  - RT to L femur 10/2023.  RT for bilateral posterior ribs 12/2023 with Dr. Albert.  - Encouraged to continue calcium and vitamin D supplementation; continue Zometa 4mg IV every ~3 months.   - Last Zometa given 8/26/2024. Next due 11/2024. ***  - Left hip and shoulder pain on 10/28/24 after fall and traveling. Continue to monitor closely. Managed with tylenol at 10/28/24. Discussed if pain fails to improve Walter should let us know so that we can further investigate.      4. Stage 3, UISS-high risk ccRCC: s/p R nephrectomy   - Nephrectomy 3/19/19 and noted incidentally.  He is now 5+ years post-op without evidence for recurrence.   - At this point, there is a minimal risk of recurrence of his RCC given the time since surgery without the use of adjuvant immunotherapy. There is no suspicious adenopathy or other features on his most recent imaging studies.    - Will continue to monitor with imaging planned for prostate cancer.    - not specifically discussed 10/28/24. ***    5. Sensory neuropathy, Toes/feet Grade 2  - continue monitor for worsening with cabazitaxel.   - Carboplatin dose reduced to AUC of 3 in September of 2024.    - Continue acupuncture once weekly, has decreased to every 3 weeks but finds benefit and plans to increase back to weekly after travels.   - He is on gabapentin and duloxetine managed by palliative care   -duloxetine dose recently increased with further improvement in neuropathy. Tried weaning off gabapentin and pain in back and ribs was uncontrolled  again. Continue duloxetine and gabapentin per palliative.      6. Anemia  - likely secondary to chemotherapy. Iron levels adequate on 4/22/24. MCV elevated.  Folate and B12 adequate.  Retic appropriately elevated.  Stable 10/28/24    7. Left lower extremity swelling   - US 4/22/24 negative for DVT. CT abdomen pelvis 4/29 without significant or new lymphadenopathy. Improved lately with new compression garments. Continue compression stocking. Following with lymphedema therapy.      Plan:   Proceed with cycle 13 carboplatin/cabazitaxel today 10/28/24. RTC in three weeks with next dose. Monitor left hip and left scapular pain.     *** minutes spent on the date of the encounter doing chart review, review of test results, interpretation of tests, patient visit, and documentation             Again, thank you for allowing me to participate in the care of your patient.        Sincerely,        LATESHA Humphreys CNP

## 2024-11-18 NOTE — NURSING NOTE
"Oncology Rooming Note    November 18, 2024 10:47 AM   Walter Reyes is a 64 year old male who presents for:    Chief Complaint   Patient presents with    Port Draw     Labs drawn via port by RN in lab. VS taken.     Oncology Clinic Visit     RTN Prostate CA     Initial Vitals: /81 (BP Location: Left arm, Patient Position: Sitting, Cuff Size: Adult Large)   Pulse 95   Temp 98.2  F (36.8  C) (Oral)   Resp 16   Wt 108.4 kg (239 lb)   SpO2 96%   BMI 34.79 kg/m   Estimated body mass index is 34.79 kg/m  as calculated from the following:    Height as of 9/23/24: 1.765 m (5' 9.5\").    Weight as of this encounter: 108.4 kg (239 lb). Body surface area is 2.31 meters squared.  Mild Pain (2) Comment: Data Unavailable   No LMP for male patient.  Allergies reviewed: Yes  Medications reviewed: Yes    Medications: Medication refills not needed today.  Pharmacy name entered into EPIC:    [facility] [place], [place] - [address] [facility] - [place], [place] - [address] [facility] [place], [place] - [address] [facility] [address] [facility] [phone]  [facility] [place], [place] - [phone] [facility]    Frailty Screening:   Is the patient here for a new oncology consult visit in cancer care? 2. No      Clinical concerns: none      Aaron Gamez             "

## 2024-11-18 NOTE — NURSING NOTE
EKG was performed today per order written by Sherry Lee.  Name and  verified with patient. Patient tolerated well without incident. File transmitted to chart.    Alexandria Moser on 2024 at 11:39 AM

## 2024-11-19 ENCOUNTER — PATIENT OUTREACH (OUTPATIENT)
Dept: ONCOLOGY | Facility: CLINIC | Age: 64
End: 2024-11-19
Payer: COMMERCIAL

## 2024-11-19 NOTE — PROGRESS NOTES
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 Albert (2023) for RT of Treatment Site: T3-T6 and bilateral ribs.  Message sent to [facility] team to determine appropriate team to see.

## 2024-11-19 NOTE — TELEPHONE ENCOUNTER
Spoke with pt over the phone. He previously tried to take as little oxycodone as possible because it made him groggy. He is open to taking it more frequently. Explained to him that it'd be helpful to know how much oxy is needed to get his pain to a more comfortable level, then a dose adjustment can be made to the fentanyl. He and his wife expressed understanding. I'll plan to check in with pt towards the end of the week.    KM MckeonN, RN  Palliative Care Nurse Clinician    [phone] (Direct)  [phone] (Main)  [phone] (Appointment Scheduling)

## 2024-11-20 ENCOUNTER — LAB (OUTPATIENT)
Dept: LAB | Facility: CLINIC | Age: 64
End: 2024-11-20
Payer: COMMERCIAL

## 2024-11-20 ENCOUNTER — HOSPITAL ENCOUNTER (OUTPATIENT)
Dept: NUCLEAR MEDICINE | Facility: CLINIC | Age: 64
Setting detail: NUCLEAR MEDICINE
Discharge: HOME | End: 2024-11-20
Payer: COMMERCIAL

## 2024-11-20 DIAGNOSIS — C61 MALIGNANT NEOPLASM OF PROSTATE METASTATIC TO BONE (H): ICD-10-CM

## 2024-11-20 DIAGNOSIS — G89.3 CANCER ASSOCIATED PAIN: ICD-10-CM

## 2024-11-20 DIAGNOSIS — C79.51 MALIGNANT NEOPLASM OF PROSTATE METASTATIC TO BONE (H): ICD-10-CM

## 2024-11-20 LAB
ATRIAL RATE - MUSE: 64 BPM
DIASTOLIC BLOOD PRESSURE - MUSE: NORMAL MMHG
INTERPRETATION ECG - MUSE: NORMAL
P AXIS - MUSE: -70 DEGREES
PR INTERVAL - MUSE: 146 MS
QRS DURATION - MUSE: 74 MS
QT - MUSE: 414 MS
QTC - MUSE: 427 MS
R AXIS - MUSE: 31 DEGREES
SYSTOLIC BLOOD PRESSURE - MUSE: NORMAL MMHG
T AXIS - MUSE: 74 DEGREES
VENTRICULAR RATE- MUSE: 64 BPM

## 2024-11-20 PROCEDURE — 78306 BONE IMAGING WHOLE BODY: CPT

## 2024-11-20 PROCEDURE — 36415 COLL VENOUS BLD VENIPUNCTURE: CPT

## 2024-11-20 PROCEDURE — 86316 IMMUNOASSAY TUMOR OTHER: CPT

## 2024-11-20 PROCEDURE — 343N000001 HC RX 343 MED OP 636

## 2024-11-20 PROCEDURE — A9503 TC99M MEDRONATE: HCPCS

## 2024-11-20 RX ORDER — TC 99M MEDRONATE 20 MG/10ML
20-30 INJECTION, POWDER, LYOPHILIZED, FOR SOLUTION INTRAVENOUS ONCE
Status: COMPLETED | OUTPATIENT
Start: 2024-11-20 | End: 2024-11-20

## 2024-11-20 RX ADMIN — TC 99M MEDRONATE 24.6 MILLICURIE: 20 INJECTION, POWDER, LYOPHILIZED, FOR SOLUTION INTRAVENOUS at 10:53

## 2024-11-21 LAB — CGA SERPL-MCNC: 500 NG/ML

## 2024-11-25 ENCOUNTER — APPOINTMENT (OUTPATIENT)
Dept: LAB | Facility: CLINIC | Age: 64
End: 2024-11-25
Payer: COMMERCIAL

## 2024-11-25 ENCOUNTER — INFUSION THERAPY VISIT (OUTPATIENT)
Dept: ONCOLOGY | Facility: CLINIC | Age: 64
End: 2024-11-25
Attending: STUDENT IN AN ORGANIZED HEALTH CARE EDUCATION/TRAINING PROGRAM
Payer: COMMERCIAL

## 2024-11-25 VITALS
SYSTOLIC BLOOD PRESSURE: 132 MMHG | OXYGEN SATURATION: 95 % | RESPIRATION RATE: 16 BRPM | WEIGHT: 240 LBS | BODY MASS INDEX: 34.93 KG/M2 | HEART RATE: 68 BPM | TEMPERATURE: 98.4 F | DIASTOLIC BLOOD PRESSURE: 82 MMHG

## 2024-11-25 DIAGNOSIS — T45.1X5A CHEMOTHERAPY-INDUCED PERIPHERAL NEUROPATHY (H): ICD-10-CM

## 2024-11-25 DIAGNOSIS — C61 MALIGNANT NEOPLASM OF PROSTATE METASTATIC TO BONE (H): ICD-10-CM

## 2024-11-25 DIAGNOSIS — D64.81 ANTINEOPLASTIC CHEMOTHERAPY INDUCED ANEMIA: ICD-10-CM

## 2024-11-25 DIAGNOSIS — C79.51 MALIGNANT NEOPLASM OF PROSTATE METASTATIC TO BONE (H): ICD-10-CM

## 2024-11-25 DIAGNOSIS — C79.9: Primary | ICD-10-CM

## 2024-11-25 DIAGNOSIS — C61 MALIGNANT NEOPLASM OF PROSTATE METASTATIC TO BONE (H): Primary | ICD-10-CM

## 2024-11-25 DIAGNOSIS — T45.1X5A ANTINEOPLASTIC CHEMOTHERAPY INDUCED ANEMIA: ICD-10-CM

## 2024-11-25 DIAGNOSIS — G03.8: Primary | ICD-10-CM

## 2024-11-25 DIAGNOSIS — G62.0 CHEMOTHERAPY-INDUCED PERIPHERAL NEUROPATHY (H): ICD-10-CM

## 2024-11-25 DIAGNOSIS — C79.51 MALIGNANT NEOPLASM OF PROSTATE METASTATIC TO BONE (H): Primary | ICD-10-CM

## 2024-11-25 DIAGNOSIS — G51.8 PAIN DUE TO NEUROPATHY OF FACIAL NERVE: ICD-10-CM

## 2024-11-25 LAB
ALBUMIN SERPL BCG-MCNC: 3.9 G/DL (ref 3.5–5.2)
ALP SERPL-CCNC: 244 U/L (ref 40–150)
ALT SERPL W P-5'-P-CCNC: 28 U/L (ref 0–70)
ANION GAP SERPL CALCULATED.3IONS-SCNC: 13 MMOL/L (ref 7–15)
AST SERPL W P-5'-P-CCNC: 41 U/L (ref 0–45)
BASOPHILS # BLD AUTO: 0 10E3/UL (ref 0–0.2)
BASOPHILS NFR BLD AUTO: 0 %
BILIRUB SERPL-MCNC: 0.2 MG/DL
BUN SERPL-MCNC: 22.6 MG/DL (ref 8–23)
CALCIUM SERPL-MCNC: 8.8 MG/DL (ref 8.8–10.4)
CHLORIDE SERPL-SCNC: 104 MMOL/L (ref 98–107)
CREAT SERPL-MCNC: 0.8 MG/DL (ref 0.67–1.17)
EGFRCR SERPLBLD CKD-EPI 2021: >90 ML/MIN/1.73M2
EOSINOPHIL # BLD AUTO: 0 10E3/UL (ref 0–0.7)
EOSINOPHIL NFR BLD AUTO: 0 %
ERYTHROCYTE [DISTWIDTH] IN BLOOD BY AUTOMATED COUNT: 16.5 % (ref 10–15)
GLUCOSE SERPL-MCNC: 165 MG/DL (ref 70–99)
HCO3 SERPL-SCNC: 23 MMOL/L (ref 22–29)
HCT VFR BLD AUTO: 30 % (ref 40–53)
HGB BLD-MCNC: 9.7 G/DL (ref 13.3–17.7)
IMM GRANULOCYTES # BLD: 0.1 10E3/UL
IMM GRANULOCYTES NFR BLD: 1 %
LYMPHOCYTES # BLD AUTO: 0.4 10E3/UL (ref 0.8–5.3)
LYMPHOCYTES NFR BLD AUTO: 4 %
MCH RBC QN AUTO: 34 PG (ref 26.5–33)
MCHC RBC AUTO-ENTMCNC: 32.3 G/DL (ref 31.5–36.5)
MCV RBC AUTO: 105 FL (ref 78–100)
MONOCYTES # BLD AUTO: 0.4 10E3/UL (ref 0–1.3)
MONOCYTES NFR BLD AUTO: 4 %
NEUTROPHILS # BLD AUTO: 8.9 10E3/UL (ref 1.6–8.3)
NEUTROPHILS NFR BLD AUTO: 91 %
NRBC # BLD AUTO: 0 10E3/UL
NRBC BLD AUTO-RTO: 0 /100
PLATELET # BLD AUTO: 172 10E3/UL (ref 150–450)
POTASSIUM SERPL-SCNC: 3.9 MMOL/L (ref 3.4–5.3)
PROT SERPL-MCNC: 6.5 G/DL (ref 6.4–8.3)
PSA SERPL DL<=0.01 NG/ML-MCNC: 38.17 NG/ML (ref 0–4.5)
RBC # BLD AUTO: 2.85 10E6/UL (ref 4.4–5.9)
SODIUM SERPL-SCNC: 140 MMOL/L (ref 135–145)
WBC # BLD AUTO: 9.9 10E3/UL (ref 4–11)

## 2024-11-25 PROCEDURE — 36591 DRAW BLOOD OFF VENOUS DEVICE: CPT | Performed by: STUDENT IN AN ORGANIZED HEALTH CARE EDUCATION/TRAINING PROGRAM

## 2024-11-25 PROCEDURE — G2211 COMPLEX E/M VISIT ADD ON: HCPCS | Performed by: STUDENT IN AN ORGANIZED HEALTH CARE EDUCATION/TRAINING PROGRAM

## 2024-11-25 PROCEDURE — 84132 ASSAY OF SERUM POTASSIUM: CPT

## 2024-11-25 PROCEDURE — 84153 ASSAY OF PSA TOTAL: CPT | Performed by: STUDENT IN AN ORGANIZED HEALTH CARE EDUCATION/TRAINING PROGRAM

## 2024-11-25 PROCEDURE — 85004 AUTOMATED DIFF WBC COUNT: CPT

## 2024-11-25 PROCEDURE — 99213 OFFICE O/P EST LOW 20 MIN: CPT | Mod: 25 | Performed by: STUDENT IN AN ORGANIZED HEALTH CARE EDUCATION/TRAINING PROGRAM

## 2024-11-25 PROCEDURE — 80053 COMPREHEN METABOLIC PANEL: CPT

## 2024-11-25 PROCEDURE — 258N000003 HC RX IP 258 OP 636: Performed by: STUDENT IN AN ORGANIZED HEALTH CARE EDUCATION/TRAINING PROGRAM

## 2024-11-25 PROCEDURE — 36415 COLL VENOUS BLD VENIPUNCTURE: CPT

## 2024-11-25 PROCEDURE — 99215 OFFICE O/P EST HI 40 MIN: CPT | Performed by: STUDENT IN AN ORGANIZED HEALTH CARE EDUCATION/TRAINING PROGRAM

## 2024-11-25 PROCEDURE — 250N000011 HC RX IP 250 OP 636: Performed by: STUDENT IN AN ORGANIZED HEALTH CARE EDUCATION/TRAINING PROGRAM

## 2024-11-25 PROCEDURE — 84403 ASSAY OF TOTAL TESTOSTERONE: CPT | Performed by: STUDENT IN AN ORGANIZED HEALTH CARE EDUCATION/TRAINING PROGRAM

## 2024-11-25 PROCEDURE — 99417 PROLNG OP E/M EACH 15 MIN: CPT | Performed by: STUDENT IN AN ORGANIZED HEALTH CARE EDUCATION/TRAINING PROGRAM

## 2024-11-25 PROCEDURE — 82247 BILIRUBIN TOTAL: CPT

## 2024-11-25 RX ORDER — HEPARIN SODIUM (PORCINE) LOCK FLUSH IV SOLN 100 UNIT/ML 100 UNIT/ML
5 SOLUTION INTRAVENOUS EVERY 8 HOURS
Status: DISCONTINUED | OUTPATIENT
Start: 2024-11-25 | End: 2024-12-02 | Stop reason: HOSPADM

## 2024-11-25 RX ORDER — ZOLEDRONIC ACID 0.04 MG/ML
4 INJECTION, SOLUTION INTRAVENOUS ONCE
Status: COMPLETED | OUTPATIENT
Start: 2024-11-25 | End: 2024-11-25

## 2024-11-25 RX ORDER — ZOLEDRONIC ACID 0.04 MG/ML
4 INJECTION, SOLUTION INTRAVENOUS ONCE
OUTPATIENT
Start: 2025-02-22 | End: 2025-02-22

## 2024-11-25 RX ORDER — HEPARIN SODIUM,PORCINE 10 UNIT/ML
5 VIAL (ML) INTRAVENOUS
OUTPATIENT
Start: 2025-02-22

## 2024-11-25 RX ORDER — HEPARIN SODIUM (PORCINE) LOCK FLUSH IV SOLN 100 UNIT/ML 100 UNIT/ML
5 SOLUTION INTRAVENOUS
OUTPATIENT
Start: 2025-02-22

## 2024-11-25 RX ORDER — HEPARIN SODIUM (PORCINE) LOCK FLUSH IV SOLN 100 UNIT/ML 100 UNIT/ML
5 SOLUTION INTRAVENOUS
Status: DISCONTINUED | OUTPATIENT
Start: 2024-11-25 | End: 2024-11-25 | Stop reason: HOSPADM

## 2024-11-25 RX ADMIN — SODIUM CHLORIDE 50 ML: 9 INJECTION, SOLUTION INTRAVENOUS at 10:52

## 2024-11-25 RX ADMIN — HEPARIN SODIUM (PORCINE) LOCK FLUSH IV SOLN 100 UNIT/ML 5 ML: 100 SOLUTION at 11:13

## 2024-11-25 RX ADMIN — HEPARIN SODIUM (PORCINE) LOCK FLUSH IV SOLN 100 UNIT/ML 5 ML: 100 SOLUTION at 09:37

## 2024-11-25 RX ADMIN — ZOLEDRONIC ACID 4 MG: 0.04 INJECTION, SOLUTION INTRAVENOUS at 10:52

## 2024-11-25 ASSESSMENT — PAIN SCALES - GENERAL: PAINLEVEL_OUTOF10: MODERATE PAIN (4)

## 2024-11-25 NOTE — NURSING NOTE
"Oncology Rooming Note    November 25, 2024 9:50 AM   Walter Reyes is a 64 year old male who presents for:    Chief Complaint   Patient presents with    Port Draw     Power needle, heparin locked, vitals checked    Oncology Clinic Visit    Prostate Cancer     Malignant neoplasm of prostate metastatic to bone      Initial Vitals: /82   Pulse 68   Temp 98.4  F (36.9  C)   Resp 16   Wt 108.9 kg (240 lb)   SpO2 95%   BMI 34.93 kg/m   Estimated body mass index is 34.93 kg/m  as calculated from the following:    Height as of 9/23/24: 1.765 m (5' 9.5\").    Weight as of this encounter: 108.9 kg (240 lb). Body surface area is 2.31 meters squared.  Moderate Pain (4) Comment: Data Unavailable   No LMP for male patient.  Allergies reviewed: Yes  Medications reviewed: Yes    Medications: Medication refills not needed today.  Pharmacy name entered into EPIC:    [facility] [place], [place] - [address] [facility] - [place], [place] - [address] [place] [address] - [address] [facility] [id number]  [facility] [place], [place] - [address] [facility]    Frailty Screening:   Is the patient here for a new oncology consult visit in cancer care? 2. No      Clinical concerns: Pt reports feeling facial nerve pain and feeling \"like a zombie\" after taking Keppra. Dr. Mcnair was notified via message.       Hayley Lind, EMT     "

## 2024-11-25 NOTE — PROGRESS NOTES
Infusion Nursing Note:  Walter Reyes presents today for Zometa.    Patient seen by provider today: Yes: Dr. Mcnair   present during visit today: Not Applicable.    Note: Patient was seen and assessed by Dr. Mcnair in clinic prior to infusion. Patient offers no additional complaints or concerns. Patient agreeable to treatment plan today.    Patient confirms taking Vitamin D and Calcium supplements as recommended.    Intravenous Access:  Implanted Port.    Treatment Conditions:  Lab Results   Component Value Date    HGB 9.7 (L) 11/25/2024    WBC 9.9 11/25/2024    ANEU 5.0 07/07/2021    ANEUTAUTO 8.9 (H) 11/25/2024     11/25/2024        Lab Results   Component Value Date     11/25/2024    POTASSIUM 3.9 11/25/2024    MAG 1.9 08/25/2023    CR 0.80 11/25/2024    BETHANY 8.8 11/25/2024    BILITOTAL 0.2 11/25/2024    ALBUMIN 3.9 11/25/2024    ALT 28 11/25/2024    AST 41 11/25/2024     Results reviewed, labs MET treatment parameters, ok to proceed with treatment.    Post Infusion Assessment:  Patient tolerated infusion without incident.  Blood return noted pre and post infusion.  Site patent and intact, free from redness, edema or discomfort.  No evidence of extravasations.  Access discontinued per protocol.     Discharge Plan:   Patient declined prescription refills.  Discharge instructions reviewed with: Patient and Family.  Patient and/or family verbalized understanding of discharge instructions and all questions answered.  AVS to patient via Power [employer].  Patient will return 12/09/24 for next appointment.   Patient discharged in stable condition accompanied by: self and wife.  Departure Mode: Ambulatory.      Heather Fatima RN

## 2024-11-25 NOTE — LETTER
"the right peripheral zone and extending into the neurovascular bundle, seminal vesicle, and along the anterolateral right mesorectal fascia. Metastatic right external iliac lymph node. Metastatic lesion in the posterior/superior right acetabulum.\"  - 1/25/2019: CT abdomen and pelvis with IV contrast - \"1. Heterogeneous enhancing mass posteriorly in the upper pole of the right kidney measures 4.5 x 5.8 x 5.7 cm (AP by transverse by craniocaudal). It has a small nodular component extending posterior medially which abuts the right psoas muscle. This nodular extension measures 2.1 x 2.1 cm. Minimal stranding about the mass. No definite thrombus within the right renal vein. This renal mass is compatible with a renal cell carcinoma. A paraaortic lymph node situated immediately posterior to the left renal vein measures 1.1 cm in short axis, suspicious for metastasis. 2. A 2 cm right external iliac lymph node is also suspicious for metastases. 3. Multiple sclerotic osseous lesions suspicious for metastases. These include 0.8 and 0.6 cm sclerotic lesions laterally in the right iliac wing (images 53 and 62 respectively). Ill-defined groundglass density laterally in the right acetabulum measuring 1.7 x 1.2 cm corresponds with the lesion identified on MRI. A 0.4 cm groundglass density in the left acetabulum. Sclerotic lesion in the left femoral neck measures 0.9 x 1.6 cm (image 81). Sclerotic metastases would be more compatible with prostate metastases. 4. A 3 subcentimeter hepatic lesions are indeterminate. Metastases would be a consideration. These can be further characterized with liver MRI.\"  - 1/25/2019: NM bone scan - \"There is focal bony uptake in the left femoral neck, right acetabulum, right of midline at the S1 or L5 level of the spine, within multiple bilateral ribs, in the C7 or T1 level of the spine, and anteriorly within the skull. Findings are suspicious for metastases.\"  - 1/31/19: CT chest with contrast - \" No " "suspicious nodules in the chest.  Stable appearance of a 5.8 cm right renal mass concerning for renal carcinoma until proven otherwise.  Stable indeterminant subcentimeter hypodensities in the liver.  Multifocal osteoblastic metastasis including 2.5 cm lesion in the right fifth rib posteriorly and a 1.6 cm lesion in the right third rib posteriorly. No lytic lesions identified.\"  - 2/6/19: CT-guided right sclerotic fifth rib lesion biopsy - \"Metastatic carcinoma, consistent with prostate primary.  Immunohistochemical stains performed show the metastatic carcinoma stains positive for NKX3.1 (prostate marker), negative for STACIE 3 and PAX8, which supports the above diagnosis.\"  - 2/15/19: Started Casodex 50mg every day and consented for the biobanking protocol. 3/18/19 - stopped Casodex.  - 2/19/19: Case discussed in tumor board - recommendation for nephectomy for suspected malignant right renal mass.   - 2/26/19: Started Lupron 22.5mg every 3 months.   - 5/8/19: Started Docetaxel 75mg/m2 IV every 3 weeks. 06/18/19 - cycle 3, 7/10/19 - cycle 4, 07/31/19 - cycle 5, 08/21/19 - cycle 6.   - 10/2/19: Restaging CT CAP and NM Bone Scan showed improved osseous disease, no evidence of recurrent or new metastatic disease.  - 1/5/20: Restaging CT CAP and NM Bone Scan showed stable osseous disease, no evidence of recurrent or new metastatic disease.  - 4/6/20: NM bone scan with slightly improved uptake in known skeletal mets. CT C/A/P with contrast with stable osseous mets, no yusuf enlargement, no new visceral mets etc.  - 04/08/20: Zometa every 3 months.  - 10/5/20: CT C/A/P with contrast and NM bone scan - SD.   - 1/4/21: CT C/A/P with contrast and NM bone scan - SD but slight increase in right 5th rib tracer uptake and in posterior L5 sclerosis. 1/6/21  PSA = 0.29  - 03/29/21: CT C/A/P with contrast - single new right sacral 8mm bone lesion (suspicious), other bone mets stable. No visceral/yusuf disease. Nephrectomy site " "without recurrence. NM bone scan - SD but \"increased uptake associated with the prior lesions of the lower cervical spine, posterior right fourth rib and right L5 posterior arch elements. Otherwise unchanged uptake associated with the proximal left femur and left anterior fourth rib. Similar uptake of the left halux, possibly secondary to degenerative osteoarthritis or gout.\"    3/31/21 PSA = 0.44  7/7/21  PSA = 0.47  11/2021 PSA = 1.05  -- Start XTANDI  12/16/21 PSA =0.22  2/11/22 PSA= 0.50  3/22/22 PSA=0.72  5/5/2022 PSA=1.79  7/11/2022 PSA=2.16 --Start cycle 1 docetaxel   8/22/22 PSA = 1.36  9/12/22 PSA = 1.09  10/24/22 Cycle #6 of Docetaxel. End of treatment  1/9/23  PSA =0.66  3/20/23  PSA=1.54  4/21/23 PSA = 1.81  T=15  5/22/23 C1 Pluvicto   06/07/23          PSA = 1.42  7/18/23 PSA=1.75, C2 Pluvicto   8/28/23 Transfer of care initial visit for Tabby.  PSA 2.06.  C3 Pluvicto scheduled for 8/30.  Proceed with dose as planned.  MR DELORIS femur, ortho referral, PSMA PET ordered for prior to follow-up.    10/2/23 Palliative RT to L femur metastasis complete.  PSMA PET with mixed response.  PSA 2.21.  RT to L femur pending.  Continue with Dose #4 of Pluvicto despite mixed response to therapy.  Dex course for possible pain flare with RT.    11/8/23  Follow-up prior to Dose #5 Pluvicto.  More pain in chest/ribs/back. PSA 4.38.  Testosterone=3.  Rad onc referral for ribs.  Biopsy progressive lesion for progression on Pluvicto.  Cabazitaxel scheduled for follow-up appointment.  Tempus blood testing unrevealing for targetable mutation.    11/27/23 Bx completed from R iliac crest but unrevealing for either PCa or RCC.  Start cabazitaxel C1D1.  PSA 2.81.    12/19/23 PSA = 3.66  1/8/24 PSA = 3.55  1/29/24 PSA = 3.07  2/19/24 PSA= 2.21-Start carboplatin/cabazitaxel.    3/11/24 PSA= C2 Carbo/cabazi.  PSA 1.99.    4/01/24 PSA= 1.74. C3 Carbo/cabazi.   4/22/24 PSA= 1.85. C4 Carbo/cabazi.   5/13/24 PSA = 2.06. C5 Carbo/cabazi. " "  6/3/24 PSA = 2.68, cycle 6 Carboplatin/cabazitaxel  6/24/24 PSA 3.51, C7 carboplatin/cabazitaxel.  Bony lesions stable on NM bone scan.  Reticulonodular densities noted in lungs as possible infection vs drug reaction.  Add chromogranin A to next labs.   7/15/24 PSA= 5.07, chromogranin A pending, cycle 8  8/5/24 PSA= 7.12, Cycle 9 carboplatin/cabazitaxel  8/26/24 PSA= 9.88, cycle 10 carbo/cabazitaxel  9/16/24 PSA= 16.53, cycle 11 carbo/cabazitaxel. Dose reduce carboplatin from AUC 4 to AUC 3 due to fatigue, anorexia, and peripheral neuropathy.   10/7/24 PSA= 19.  Cycle 12 carbo/cabazitaxel given on 10/8/2024.   10/28/24 PSA= 24.36. Cycle 13 carboplatin/cabazitaxel.   11/18/24 PSA= 26, Cycle 14 carboplatin/cabazitaxel- HELD   12/5/24  PSA 38.17, chromogranin A elevated at 500.  Pending whole brain irradiation.  NM bone scan with evidence of bony progression.  MR brain with concern for pachymeningeal enhancement possibly related to brain metastases.      Radiation history:   -C7         3,000 cGy       06 X      8/05/2021        8/18/2021        13       10  -2 Sacrum          2,500 cGy       18 X      4/12/2022        4/18/2022         6           -Sacrum retreat               700 cGy        18 X      2/28/2023        2/28/2023  -Left femur to 2000 cGy in 5 fractions, completed 10/10/2023   -bilateral posterior chest wall at an area of osseous involvement of the ribs and adjacent T4 and 5 spine, to be delivered to 2000 cGy in 5 fractions.  completed 12/13-12/19/23.    2. Stage III ([id number]), grade 3 of 4, clear-cell RCC of right kidney:  - Incidentally diagnosed as above.   - Underwent curative-intent robotic right radical nephrectomy with Dr. Wesley Cleveland on 3/19/19. No tumor spillage per op report.   - Path showed: \"Histologic Type: Clear cell renal cell carcinoma; Sarcomatoid Features: Not identified; Histologic Grade: Nucleolar grade 3 (WHO/ISUP). Extent Tumor Size: 4.3 cm. Microscopic Tumor Extension: Tumor " "extension into renal sinus (in vascular structures). Margins: Negative. Tumor Necrosis: Present; focal. Lymph-Vascular Invasion: Not identified.  Pathologic Staging (pTNM) Primary Tumor (pT): pT3a: Tumor extends into renal vein branches/renal sinus. Regional Lymph Nodes (pN): pNX. Number of Lymph Nodes Examined: 0 Distant Metastasis (pM): pM N/A.\"  - Restaging scans as above.  No current concerns for recurrence.      PAST MEDICAL HISTORY:  Past Medical History:   Diagnosis Date     Cancer of kidney, right (H)      Complication of anesthesia     slow wakeup      Malignant neoplasm of prostate metastatic to bone (H) 2/14/2019     Thyroid disease      CURRENT MEDICATIONS:   Current Outpatient Medications:      acetaminophen (TYLENOL) 325 MG tablet, Take 325-650 mg by mouth every 6 hours as needed for mild pain. Extra strength, Disp: , Rfl:      atorvastatin (LIPITOR) 80 MG tablet, Take 40 mg by mouth daily., Disp: , Rfl:      BERBERINE CHLORIDE PO, , Disp: , Rfl:      calcium citrate-vitamin D (CITRACAL) 315-250 MG-UNIT TABS per tablet, Take 500 mg by mouth 2 times daily With 800 Vd per pt, Disp: , Rfl:      cycloSPORINE (RESTASIS) 0.05 % ophthalmic emulsion, Place 1 drop into both eyes 2 times daily , Disp: , Rfl:      dexAMETHasone (DECADRON) 4 MG tablet, Take 2 tablets (8 mg) by mouth 2 times daily (with meals)., Disp: 56 tablet, Rfl: 0     dexAMETHasone (DECADRON) 4 MG tablet, Take 2 tablets (8 mg) by mouth daily Take for 3 days, starting the day after chemo. Take with food. (Patient not taking: Reported on 11/29/2024), Disp: 6 tablet, Rfl: 2     DULoxetine (CYMBALTA) 60 MG capsule, Take 1 capsule (60 mg) by mouth daily., Disp: 90 capsule, Rfl: 1     fentaNYL (DURAGESIC) 25 mcg/hr 72 hr patch, Place 1 patch onto the skin every 72 hours. Remove old patch when placing new patch., Disp: 10 patch, Rfl: 0     levETIRAcetam (KEPPRA) 500 MG tablet, Take 1 tablet (500 mg) by mouth 2 times daily., Disp: 60 tablet, Rfl: 2    "  levothyroxine (SYNTHROID/LEVOTHROID) 112 MCG tablet, Take 112 mcg by mouth daily, Disp: , Rfl:      loratadine (CLARITIN) 10 MG tablet, Take 10 mg by mouth daily, Disp: , Rfl:      Multiple Vitamins-Minerals (MULTIVITAMIN ADULT EXTRA C PO), Take 1 tablet by mouth every 24 hours, Disp: , Rfl:      naloxone (NARCAN) 4 MG/0.1ML nasal spray, Spray 1 spray (4 mg) into one nostril alternating nostrils as needed for opioid reversal every 2-3 minutes until assistance arrives, Disp: 0.2 mL, Rfl: 1     Nutritional Supplements (SALMON OIL) CAPS, Take 2 capsules by mouth daily , Disp: , Rfl:      omeprazole (PRILOSEC) 20 MG DR capsule, Take 20 mg by mouth daily, Disp: , Rfl:      ondansetron (ZOFRAN) 8 MG tablet, Take 1 tablet (8 mg) by mouth every 8 hours as needed for nausea (vomiting) (Patient not taking: Reported on 11/29/2024), Disp: 30 tablet, Rfl: 2     oxyCODONE (ROXICODONE) 5 MG tablet, Take 1-2 tablets (5-10 mg) by mouth every 3 hours as needed for severe pain., Disp: 90 tablet, Rfl: 0     prochlorperazine (COMPAZINE) 10 MG tablet, Take 1 tablet (10 mg) by mouth every 6 hours as needed for nausea or vomiting (Patient not taking: Reported on 11/29/2024), Disp: 30 tablet, Rfl: 5     tamsulosin (FLOMAX) 0.4 MG capsule, Take 1 capsule (0.4 mg) by mouth daily, Disp: 30 capsule, Rfl: 3     gabapentin (NEURONTIN) 300 MG capsule, Take 2 capsules (600 mg) by mouth every morning AND 3 capsules (900 mg) daily at 2 pm AND 3 capsules (900 mg) at bedtime., Disp: 240 capsule, Rfl: 3     ivermectin (STROMECTOL) 3 MG TABS tablet, , Disp: , Rfl:      predniSONE (DELTASONE) 5 MG tablet, Take 5 mg by mouth 2 times daily. Pt is taking 10 mg in the AM and 5 mg in the evening.  This is a taper dose and he will continue to taper down with starting radiation and being off chemotherapy at that is what prednisone was being used for originally., Disp: , Rfl:     Current Facility-Administered Medications:      heparin lock flush 100 unit/mL  injection 5 mL, 5 mL, Intracatheter, Q8H, Omar Mcnair MD, 5 mL at 11/25/24 0937      Physical Exam:   /82   Pulse 68   Temp 98.4  F (36.9  C)   Resp 16   Wt 108.9 kg (240 lb)   SpO2 95%   BMI 34.93 kg/m    Wt Readings from Last 10 Encounters:   11/29/24 106.9 kg (235 lb 9.6 oz)   11/26/24 108.9 kg (240 lb)   11/25/24 108.9 kg (240 lb)   11/22/24 109.9 kg (242 lb 4.8 oz)   11/18/24 108.4 kg (239 lb)   10/28/24 109.5 kg (241 lb 8 oz)   10/07/24 109.2 kg (240 lb 11.2 oz)   09/23/24 108.9 kg (240 lb)   09/16/24 109.5 kg (241 lb 6.4 oz)   08/26/24 113.1 kg (249 lb 6.4 oz)   General: The patient is a pleasant male in no acute distress.  HEENT: EOMI. No swelling of the right upper eyelid. Pupils equal and round. Sclerae are anicteric. Mucous membranes moist.   Lymph: Neck is supple. No gross adenopathy.   Heart: No audible murmur.   Lungs: breathing comfortably on room air.   Abdomen:  non-distended.   MSK: Ambulates without assistance.      Neuro: Cranial nerves II through XII are grossly intact.  Loss of sensation present inferiorly and medially to R nose.    Skin: No rashes, petechiae, or bruising noted on exposed skin.  Psych: affect bright, alert and oriented.     LABORATORY DATA:  Most Recent 3 CBC's:  Recent Labs   Lab Test 11/25/24  0934 11/18/24  1020 10/28/24  1020   WBC 9.9 5.5 8.4   HGB 9.7* 9.0* 9.4*   * 109* 108*    127* 117*   ANEUTAUTO 8.9* 4.2 7.4     Most Recent 3 BMP's:  Recent Labs   Lab Test 11/25/24  0934 11/18/24  1020 10/28/24  1020    142 141   POTASSIUM 3.9 3.6 4.1   CHLORIDE 104 108* 107   CO2 23 24 23   BUN 22.6 16.2 17.8   CR 0.80 0.87 0.89   ANIONGAP 13 10 11   BETHANY 8.8 8.9 9.2   * 139* 136*   PROTTOTAL 6.5 6.4 6.4   ALBUMIN 3.9 3.7 4.0    Most Recent 3 LFT's:  Recent Labs   Lab Test 11/25/24  0934 11/18/24  1020 10/28/24  1020   AST 41 32 32   ALT 28 16 20   ALKPHOS 244* 165* 132   BILITOTAL 0.2 0.2 0.4     PSA trend, see oncology history.   "    ASSESSMENT & PLAN: Mr. Reyes is a 64 year old gentleman with metastatic castration sensitive prostate adenocarcinoma as well as an incidentally diagnosed stage III clear-cell renal cell carcinoma of the right kidney (s/p radical R nephrectomy 3/19/19).     Metastatic castration-resistant prostate cancer, with involvement of the bones and RPLN:   - Patient met the criteria for CHAARTED \"high-volume\" metastatic hormone-sensitive prostate cancer. He opted for docetaxel in combination with ADT (per ARTUROED, [id number], STAMPEDE). He was subsequently treated with docetaxel x6 doses in the [facility] setting and enzalutamide.  He initiated Pluvicto in May 2023 with an initial slight decline in PSA, but this began to rise just prior to Cycle 3.  Given his XR evidence of progression in L femur and PSA rise, we re-evaluated his progression with PSMA PET scan in September 2023, with note of mixed response.  Canceled Pluvicto Dose for November due to symptomatic progression with PSA rise. PSA is labile. Biopsy results from 11/21 was unrevealing for evaluation of NEPC vs small cell given progression without a significant rise in PSA. Tempus peripheral blood mutation analysis is unrevealing for targetable mutations. Cabazitaxel started 11/27/23 given progression on Pluvicto. Carboplatin added 2/2024 and cabazitaxel dose reduced by 20% in April 2024 due to neuropathy.  Restaging with pulmonary consolidative findings without mass-like features, which had improved on October 2024 restaging imaging. If neuropathy remains intolerable, we could consider addition of Ra-223 and enzalutamide per PEACE-3 results, although this was for a first-line [facility] patient subset without prior ART therapy.  Notably, he has not received ART therapy in more than 3 years at the time of our October 2024. This does require continued administration of antiresorptive therapy.    -Lupron every 3 months, next due 12/30/24.    -Cabazitaxel to be " continued at 20% dose reduction. Further reduced carboplatin to an AUC of 3 due to fatigue, anorexia, and worsening peripheral neuropathy in September 2024.   -He has tapered prednisone from 10 mg BID to 10 mg in the AM and 5 mg in the PM.   -CBC and CMP generally stable and notable for ongoing mild anemia and thrombocytopenia.   -Increased pain and new sites of pain in right eye and clavicle, along with neurological changes in the face that are ongoing since stopping Ivermectin. HOLD chemotherapy today. MR brain with orbits and CT CAP ordered.   -Imaging demonstrating progression including enhancing bone lesions in the frontal and right parietal bones as well as pachymeningeal enhancement concerning for mets. Bone scan further demonstrates bone progression including in the right mandible, right inferior and superior lateral orbit.   - Wife and patient inquire if his pachymengineal disease could be related to his previously treated stage III renal cancer. Discussed in the setting of progressive prostate cancer elsewhere that I suspect this is most consistent with prostate pathology.   - Chromogranin A is further elevated on 11/20/24 labs to 500 from 271 on 7/15/24.   - Started dexamethasone 8 mg BID on 11/21/24 after patient called into triage with worsening facial numbness/pain. Symptoms are mildly improved 11/22/24. Given improvement, we will continue dexamethasone 8 mg BID with option to increase to TID if needed. However, patient is having insomnia already to hesitant to increase given improvement. Move evening dose earlier to try to avoid insomnia.   - Hold additional cycles of carboplatin/cabazitaxel as of 11/25 due to bony progression with pachymeningeal involvement.   - We have reached out again to radiation oncology for consideration of whole brain irradiation for pachymeningeal involvement.    - Walter and Micheline will think about how he would like to proceed.  Radium 223 and mitoxantrone are both reasonable  options as is a palliative care-focused approach.  Unfortunately, clinical trials with immune engager therapies are not options with CNS involvement (JORGE ALBERTO-280) and Zometa use within the past year (LAVA-1207).      Facial numbness, eye pain.   Stopped Ivermectin 11/13 but symptoms continue. Facial numbness between bottom lip and chin on right side, with pain/burning sensation. New pain behind right eye with improved swelling/drooping. Improving with dexamethasone. After review of MRI findings this is likely due to nerve involvement, although there is no clear culprit lesion on imaging.  Hopefully WBRT will be helpful with symptoms.  He is already on gabapentin and keppra.      Pressure in chest, resolved:  Has an ongoing sensation of pressure/need to cough in his chest, primarily against his sternum. Vital signs stable, negative pulmonary physical exam. No chest pain. No recurrence 11/22/24.     Bone metastases:    - Noted to have osseous metastatic disease at the time of diagnosis. S/p radiation to C spine and sacrum (Re-irradiation to sacrum).   - pain mgmt per palliative care, pain is now well controlled with fentanyl patch. Dexamethasone burst is helpful for pain flares and improved bone pain since starting dex 11/21/24.   - Continue 10mg prednisone daily with dexamethasone if he prefers.    - RT to L femur 10/2023.  RT for bilateral posterior ribs 12/2023 with Dr. Albert.  - Encouraged to continue calcium and vitamin D supplementation; continue Zometa 4mg IV every ~3 months.   - Last Zometa given 8/26/2024. Next due 11/2024. Scheduled for infusion next week at [facility].   - Increased pain in forehead, hips, back especially left posterior shoulder and new pain in right clavicle. MR brain and bone scan demonstrating progression in skull mets.    Stage 3, UISS-high risk ccRCC: s/p R nephrectomy   - Nephrectomy 3/19/19 and noted incidentally.  He is now 5+ years post-op without evidence for recurrence.   - At this  point, there is a minimal risk of recurrence of his RCC given the time since surgery without the use of adjuvant immunotherapy. There is no suspicious adenopathy or other features on his most recent imaging studies.    - Will continue to monitor with imaging planned for prostate cancer.    - not specifically discussed today (11/18/24).     Sensory neuropathy, Toes/feet Grade 2  - continue monitor for worsening with cabazitaxel.   - Carboplatin dose reduced to AUC of 3 in September of 2024.    - Continue acupuncture once weekly, has decreased to every 3 weeks but finds benefit and plans to increase back to weekly after travels.   - He is on gabapentin and duloxetine managed by palliative care   -duloxetine dose recently increased with further improvement in neuropathy. Tried weaning off gabapentin and pain in back and ribs was uncontrolled again. Continue duloxetine and gabapentin per palliative.      Anemia  - likely secondary to chemotherapy. Iron levels adequate on 4/22/24. MCV elevated.  Folate and B12 adequate.  Retic appropriately elevated.  Stable 11/18/24.    Left lower extremity swelling   - US 4/22/24 negative for DVT. CT abdomen pelvis 4/29 without significant or new lymphadenopathy. Improved lately with new compression garments. Continue compression stocking. Following with lymphedema therapy.    Overall plan:  - continue dex 8 mg BID  - Continue keppra 500 mg BID  - Think about how you would like to proceed with cares.  Radium 223 and mitoxantrone are both reasonable options.    - We will reach out to radiation oncology regarding scheduling for whole brain irradiation evaluation.    - Return as scheduled.    - Zometa and Lupron shots today at [facility] as scheduled.      The longitudinal plan of care for the diagnosis(es)/condition(s) as documented were addressed during this visit. Due to the added complexity in care, I will continue to support Walter in the subsequent management and with ongoing continuity  of care.    A total of 60 minutes spent on the date of the encounter doing chart review, review of test results, interpretation of tests, patient visit, and documentation.  The patient was given the opportunity to ask multiple questions today, all of which were answered to their satisfaction.     Omar Mcnair MD, PhD   of Medicine   Oncology/BMT/Cellular Therapies      Again, thank you for allowing me to participate in the care of your patient.        Sincerely,        Omar Mcnair MD

## 2024-11-25 NOTE — PATIENT INSTRUCTIONS
[facility] Triage and after hours / weekends / holidays:  [phone]    Please call the triage or after hours line if you experience a temperature greater than or equal to 100.4, shaking chills, have uncontrolled nausea, vomiting and/or diarrhea, dizziness, shortness of breath, chest pain, bleeding, unexplained bruising, or if you have any other new/concerning symptoms, questions or concerns.      If you are having any concerning symptoms or wish to speak to a provider before your next infusion visit, please call triage to notify them so we can adequately serve you.     If you need a refill on a narcotic prescription or other medication, please call before your infusion appointment.

## 2024-11-25 NOTE — Clinical Note
11/25/2024      Walter Reyes  [address] [address] [facility] [address] [id number]      Dear Colleague,    Thank you for referring your patient, Walter Reyes, to the [facility]. Please see a copy of my visit note below.    No notes on file    Again, thank you for allowing me to participate in the care of your patient.        Sincerely,        Omar Mcnair MD

## 2024-11-25 NOTE — NURSING NOTE
Chief Complaint   Patient presents with    Port Draw     Power needle, heparin locked, vitals checked     Karin Burr RN on 11/25/2024 at 9:36 AM

## 2024-11-26 ENCOUNTER — VIRTUAL VISIT (OUTPATIENT)
Dept: PALLIATIVE MEDICINE | Facility: CLINIC | Age: 64
End: 2024-11-26
Payer: COMMERCIAL

## 2024-11-26 VITALS — HEIGHT: 71 IN | BODY MASS INDEX: 33.6 KG/M2 | WEIGHT: 240 LBS

## 2024-11-26 DIAGNOSIS — C61 PROSTATE CANCER (H): ICD-10-CM

## 2024-11-26 DIAGNOSIS — M54.10 RADICULOPATHY, UNSPECIFIED SPINAL REGION: ICD-10-CM

## 2024-11-26 LAB — TESTOST SERPL-MCNC: <2 NG/DL (ref 240–950)

## 2024-11-26 PROCEDURE — 99214 OFFICE O/P EST MOD 30 MIN: CPT | Mod: 95 | Performed by: NURSE PRACTITIONER

## 2024-11-26 RX ORDER — GABAPENTIN 300 MG/1
CAPSULE ORAL
Qty: 240 CAPSULE | Refills: 3 | Status: SHIPPED | OUTPATIENT
Start: 2024-11-26

## 2024-11-26 ASSESSMENT — PAIN SCALES - GENERAL: PAINLEVEL_OUTOF10: MILD PAIN (2)

## 2024-11-26 NOTE — NURSING NOTE
Current patient location: [id number] [address] [address]    Is the patient currently in the state of [place]? YES    Visit mode:VIDEO    If the visit is dropped, the patient can be reconnected by:VIDEO VISIT: Text to cell phone:   Telephone Information:   Mobile [phone]       Will anyone else be joining the visit? NO  (If patient encounters technical issues they should call [phone]    Are changes needed to the allergy or medication list? No    Are refills needed on medications prescribed by this physician? NO    Rooming Documentation:  Questionnaire(s) completed    Reason for visit: RECHECK    Mick MOREIRAF

## 2024-11-26 NOTE — PROGRESS NOTES
Virtual Visit Details    Type of service:  Video Visit   Video start: 1055 AM  Video stop: 11:05 AM    Originating Location (pt. Location): Home    Distant Location (provider location):  On-site  Platform used for Video Visit: [facility]      Palliative Care Outpatient Clinic      Patient ID/Chief Complaint: Walter Reyes 63 year old male who is presenting to the palliative medicine clinic today at the request of Yamilet Espinoza PA-C for a palliative care follow-up secondary to metastatic prostate cancer.   The patient's primary care provider is:  Poncho Ortiz       Impression & Recommendations:  63-year-old male with metastatic prostate cancer with extensive metastatic disease involving bones (scattered throughout the axial and appendicular skeleton, sites involved include the calvarium, clavicles, scapula, bilateral ribs, vertebral bodies, pelvic bones, bilateral femora more extensive on the left, bilateral frontal bones and right  parietal bone consistent with metastatic disease, dural thickening along the lateral right  cerebral convexity and right frontal convexity with associated pachymeningeal enhancement, 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  High risk for recurrence.    Past medical history also includes hyperlipidemia, hypothyroidism, HTN, GERD, Acosta's neuroma, plantar fasciitis, peroneal tendinitis, and mild chemotherapy-induced polyneuropathy.    Follow-up visit completed today to address new onset right facial pain from eye down through cheek  to jaw.  Pain is described as having significant burning component with numbness and tingling.  Radiation oncology is consulted to consider palliative RTX following recent brain MRI.  He is on dexamethasone which has helped some.  He was recently started on  "Keppra for seizure prophylaxis, causing feelings of sedation.        Symptoms/recommendations/discussion:  Healthcare directive present in EMR. Wife Micheline is agent if needed.   Continue fentanyl patch 25 mcg/h.   Increase gabapentin to 600 mg every morning, 900 mg afternoon, 900 mg at bedtime.  Continue Cymbalta to 60 mg daily for neuropathy and mood.  Other adjustments pending response to increase gabapentin  Continue oxycodone IR 5-10 mg every 4 hours as needed--- generally takes only before bed.  Narcan nasal spray ordered  Social situation includes residing in home with wife Micheline. Stable housing and food. No h/o substance abuse.   Palliative follow-up in 3 months. He has direct phone # for our team.         How to get a hold of us:  For non-urgent matters, MyChart works best.    For more urgent matters, or if you prefer not to use MyChart, call our clinic nurse coordinator Sherry Palma RN at [phone] or [phone]    We have an on-call number for evenings and weekends. Please call this only if you are having uncontrolled symptoms or serious side effects from your medicines: [phone].     For refills, please give us a week (5 working days) notice. We don't always have providers available everyday to do refills. If you call the day you run out of your medicine, we may not be able to refill it in time, so call 5 days in advance        History:  History gathered today from: patient, family/loved ones, medical chart, outside records including Care Everywhere      PE: Ht 1.803 m (5' 11\")   Wt 108.9 kg (240 lb)   BMI 33.47 kg/m     Wt Readings from Last 3 Encounters:   11/26/24 108.9 kg (240 lb)   11/25/24 108.9 kg (240 lb)   11/22/24 109.9 kg (242 lb 4.8 oz)       Gen alert, comfortable appearing, NAD.   Head NCAT.  Eyes anicteric without injection  Face symmetric, eyes conjugate  Lungs unlabored, no cough, speaking full sentences  Skin no rashes or lesions evident on face/neck  Neuro Face symmetric, eyes " conjugate; speech fluent.  Neuropsych exam normal including affect, sensorium, gross memory, thought processes, and fund of knowledge.         Data reviewed:  I reviewed electrolytes, BUN/creatinine, liver profile, hemoglobin and hematocrit, platelet count, and most recent imaging  Recent oncology notes     database reviewed: 11/26      23 minutes spent on the date of the encounter doing chart review, history and exam, patient education & counseling, documentation and other activities as noted above.        Thank you for involving us in the patient's care.   LATESHA Seaman, [facility]

## 2024-11-26 NOTE — PATIENT INSTRUCTIONS
Thank you for meeting with us in the [facility] Palliative Care [facility].    How to get a hold of us:  For non-urgent matters, MyChart works best.    For more urgent matters, or if you prefer not to use MyChart, call our clinic nurse coordinator Sherry Palma RN at [phone] or [phone]    We have an on-call number for evenings and weekends. Please call this only if you are having uncontrolled symptoms or serious side effects from your medicines: [phone].     For refills, please give us a week (5 working days) notice. We don't always have providers available everyday to do refills. If you call the day you run out of your medicine, we may not be able to refill it in time, so call 5 days in advance!  She did

## 2024-11-29 ENCOUNTER — APPOINTMENT (OUTPATIENT)
Dept: RADIATION ONCOLOGY | Facility: CLINIC | Age: 64
End: 2024-11-29
Attending: RADIOLOGY
Payer: COMMERCIAL

## 2024-11-29 VITALS
BODY MASS INDEX: 32.86 KG/M2 | WEIGHT: 235.6 LBS | HEART RATE: 96 BPM | RESPIRATION RATE: 16 BRPM | OXYGEN SATURATION: 96 % | DIASTOLIC BLOOD PRESSURE: 80 MMHG | SYSTOLIC BLOOD PRESSURE: 121 MMHG

## 2024-11-29 DIAGNOSIS — C61 MALIGNANT NEOPLASM OF PROSTATE METASTATIC TO BONE (H): Primary | ICD-10-CM

## 2024-11-29 DIAGNOSIS — C79.51 MALIGNANT NEOPLASM OF PROSTATE METASTATIC TO BONE (H): Primary | ICD-10-CM

## 2024-11-29 PROCEDURE — 77334 RADIATION TREATMENT AID(S): CPT | Mod: 26 | Performed by: RADIOLOGY

## 2024-11-29 PROCEDURE — 77334 RADIATION TREATMENT AID(S): CPT | Performed by: RADIOLOGY

## 2024-11-29 PROCEDURE — 99214 OFFICE O/P EST MOD 30 MIN: CPT | Performed by: RADIOLOGY

## 2024-11-29 RX ORDER — PREDNISONE 5 MG/1
5 TABLET ORAL 2 TIMES DAILY
COMMUNITY
End: 2024-12-10

## 2024-11-29 ASSESSMENT — PAIN SCALES - GENERAL: PAINLEVEL_OUTOF10: MILD PAIN (3)

## 2024-11-29 NOTE — PROGRESS NOTES
"Oncology Rooming Note    November 29, 2024 8:14 AM   Walter Reyes is a 64 year old male who presents for:    Chief Complaint   Patient presents with    Cancer     Follow-up   Metastatic prostate cancer and stage 3 clear cell RCC (s/p radical R nephrectomy 3/19/19) with no current concerns for recurrence.     Initial Vitals: /80 (BP Location: Left arm)   Pulse 96   Resp 16   Wt 106.9 kg (235 lb 9.6 oz)   SpO2 96%   BMI 32.86 kg/m   Estimated body mass index is 33.47 kg/m  as calculated from the following:    Height as of 11/26/24: 1.803 m (5' 11\").    Weight as of 11/26/24: 108.9 kg (240 lb). There is no height or weight on file to calculate BSA.  Data Unavailable Comment: Data Unavailable   No LMP for male patient.  Allergies reviewed: Yes  Medications reviewed: Yes    Medications: Medication refills not needed today.  Pharmacy name entered into EPIC:    [facility] [place], [place] - [address]   [facility] - [place], [place] - [address] [place] [address] [facility] [place], [place] - [address] [facility] [phone]  [facility] [place], [place] - [address] [facility]    Frailty Screening:   Is the patient here for a new oncology consult visit in cancer care? 2. No    Pain  Current history of pain associated with this visit:   Intensity: 3/10 currently, but will awaken Pt in the middle of the night at an increased severity of 8/10 sometimes.   Pt reports there has been no decrease in the burning or numbness sensation.  The pain between his shoulders though is much improved.  Current: burning and numbness  Location: Pt's R side of the face in a triangular shape starting underneath the R eye has ongoing sensations of numbness and burning, as well as the R nostril and teeth on the R side are numb.  Treatment: Dexamethasone 8 mg BID and Gabapentin was increased by 900 mg/day to a total dose of 2400 mg/day.  Pt uses a fentanyl " patch and reports he has decreased his use of oxycodone to 5 mg at bedtime, whereas before he was needing it a couple times during the day.    Considerations for radiation treatment   Pregnancy status: Male   Implanted Cardiac Devices: Yes   Any previous radiation therapy: Yes  C7 received 3000 cGy in 10 fractions, completed 08/18/21.  Sacrum received 2500 cGy, completed 04/18/22.  Sacrum received 700 cGy, completed 02/28/23.  Left femur received 2000 cGy in 5 fractions, completed 10/10/23.  T3-T6 and bilateral ribs each received 2000 cGy in 5 fractions each, completed 12/19/23.     Clinical concerns: Pt is here to discuss the recommended radiation treatment plan and has chosen partial brain/skull radiation therapy with IMRT.      Dr. Albert was notified.      Mireya Navarro RN

## 2024-11-29 NOTE — LETTER
"    11/29/2024         RE: Walter Reyes  [address] [facility] [address]      Oncology Rooming Note    November 29, 2024 8:14 AM   Walter Reyes is a 64 year old male who presents for:    Chief Complaint   Patient presents with     Cancer     Follow-up   Metastatic prostate cancer and stage 3 clear cell RCC (s/p radical R nephrectomy 3/19/19) with no current concerns for recurrence.     Initial Vitals: /80 (BP Location: Left arm)   Pulse 96   Resp 16   Wt 106.9 kg (235 lb 9.6 oz)   SpO2 96%   BMI 32.86 kg/m   Estimated body mass index is 33.47 kg/m  as calculated from the following:    Height as of 11/26/24: 1.803 m (5' 11\").    Weight as of 11/26/24: 108.9 kg (240 lb). There is no height or weight on file to calculate BSA.  Data Unavailable Comment: Data Unavailable   No LMP for male patient.  Allergies reviewed: Yes  Medications reviewed: Yes    Medications: Medication refills not needed today.  Pharmacy name entered into EPIC:    [facility] [place], [place] - [address] [place]   [facility] - [place], [place] - [address] [facility] [place], [place] - [phone]  [facility] [place], [place] - [address] [facility]    Frailty Screening:   Is the patient here for a new oncology consult visit in cancer care? 2. No    Pain  Current history of pain associated with this visit:   Intensity: 3/10 currently, but will awaken Pt in the middle of the night at an increased severity of 8/10 sometimes.   Pt reports there has been no decrease in the burning or numbness sensation.  The pain between his shoulders though is much improved.  Current: burning and numbness  Location: Pt's R side of the face in a triangular shape starting underneath the R eye has ongoing sensations of numbness and burning, as well as the R nostril and teeth on the R side are numb.  Treatment: Dexamethasone " 8 mg BID and Gabapentin was increased by 900 mg/day to a total dose of 2400 mg/day.  Pt uses a fentanyl patch and reports he has decreased his use of oxycodone to 5 mg at bedtime, whereas before he was needing it a couple times during the day.    Considerations for radiation treatment   Pregnancy status: Male   Implanted Cardiac Devices: Yes   Any previous radiation therapy: Yes  C7 received 3000 cGy in 10 fractions, completed 21.  Sacrum received 2500 cGy, completed 22.  Sacrum received 700 cGy, completed 23.  Left femur received 2000 cGy in 5 fractions, completed 10/10/23.  T3-T6 and bilateral ribs each received 2000 cGy in 5 fractions each, completed 23.     Clinical concerns: Pt is here to discuss the recommended radiation treatment plan and has chosen partial brain/skull radiation therapy with IMRT.      Dr. Albert was notified.      Mireya Navarro, RN              RADIATION ONCOLOGY FOLLOWUP RE-EVALUATION    Patient Name: Walter Reyes  Requesting Physician: Dr. 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      Medical oncologist: Dr. Mcnair      Interval History:       On 2024, he called medical oncology reporting 1 week of new onset of frontal headaches and facial sensation changes in the right half of his face, with numbness and burning from his nose, upper lip and chin area as well as  "numbness at the maxillary teeth.  He described this as feeling \"Novocain numbness.\" Reported onset of a burning feeling 5 minutes after touching his face on the right side.  Headache was described as bitemporal, behind the eyes, and across the forehead.    On 11/21/2024, he began dexamethasone 8 Mg twice daily. After initiation of steroids, the eyelid drooping and right facial allodynia resolved.      Medications:   Current Outpatient Medications:      acetaminophen (TYLENOL) 325 MG tablet, Take 325-650 mg by mouth every 6 hours as needed for mild pain. Extra strength, Disp: , Rfl:      atorvastatin (LIPITOR) 80 MG tablet, Take 40 mg by mouth daily., Disp: , Rfl:      BERBERINE CHLORIDE PO, , Disp: , Rfl:      calcium citrate-vitamin D (CITRACAL) 315-250 MG-UNIT TABS per tablet, Take 500 mg by mouth 2 times daily With 800 Vd per pt, Disp: , Rfl:      cycloSPORINE (RESTASIS) 0.05 % ophthalmic emulsion, Place 1 drop into both eyes 2 times daily , Disp: , Rfl:      dexAMETHasone (DECADRON) 4 MG tablet, Take 2 tablets (8 mg) by mouth 2 times daily (with meals)., Disp: 56 tablet, Rfl: 0     dexAMETHasone (DECADRON) 4 MG tablet, Take 2 tablets (8 mg) by mouth daily Take for 3 days, starting the day after chemo. Take with food. (Patient taking differently: Take 8 mg by mouth as needed. Take for 3 days, starting the day after chemo. Take with food.), Disp: 6 tablet, Rfl: 2     DULoxetine (CYMBALTA) 60 MG capsule, Take 1 capsule (60 mg) by mouth daily., Disp: 90 capsule, Rfl: 1     fentaNYL (DURAGESIC) 25 mcg/hr 72 hr patch, Place 1 patch onto the skin every 72 hours. Remove old patch when placing new patch., Disp: 10 patch, Rfl: 0     furosemide (LASIX) 20 MG tablet, Take 1 tablet (20 mg) by mouth daily (Patient not taking: Reported on 9/16/2024), Disp: 15 tablet, Rfl: 1     gabapentin (NEURONTIN) 300 MG capsule, Take 2 capsules (600 mg) by mouth every morning AND 3 capsules (900 mg) daily at 2 pm AND 3 capsules (900 mg) at " bedtime., Disp: 240 capsule, Rfl: 3     ivermectin (STROMECTOL) 3 MG TABS tablet, , Disp: , Rfl:      levETIRAcetam (KEPPRA) 500 MG tablet, Take 1 tablet (500 mg) by mouth 2 times daily., Disp: 60 tablet, Rfl: 2     levothyroxine (SYNTHROID/LEVOTHROID) 112 MCG tablet, Take 112 mcg by mouth daily, Disp: , Rfl:      loratadine (CLARITIN) 10 MG tablet, Take 10 mg by mouth daily, Disp: , Rfl:      Multiple Vitamins-Minerals (MULTIVITAMIN ADULT EXTRA C PO), Take 1 tablet by mouth every 24 hours, Disp: , Rfl:      naloxone (NARCAN) 4 MG/0.1ML nasal spray, Spray 1 spray (4 mg) into one nostril alternating nostrils as needed for opioid reversal every 2-3 minutes until assistance arrives, Disp: 0.2 mL, Rfl: 1     Nutritional Supplements (SALMON OIL) CAPS, Take 2 capsules by mouth daily , Disp: , Rfl:      omeprazole (PRILOSEC) 20 MG DR capsule, Take 20 mg by mouth daily, Disp: , Rfl:      ondansetron (ZOFRAN) 8 MG tablet, Take 1 tablet (8 mg) by mouth every 8 hours as needed for nausea (vomiting), Disp: 30 tablet, Rfl: 2     oxyCODONE (ROXICODONE) 5 MG tablet, Take 1-2 tablets (5-10 mg) by mouth every 3 hours as needed for severe pain., Disp: 90 tablet, Rfl: 0     prochlorperazine (COMPAZINE) 10 MG tablet, Take 1 tablet (10 mg) by mouth every 6 hours as needed for nausea or vomiting, Disp: 30 tablet, Rfl: 5     tamsulosin (FLOMAX) 0.4 MG capsule, Take 1 capsule (0.4 mg) by mouth daily, Disp: 30 capsule, Rfl: 3  No current facility-administered medications for this visit.    Facility-Administered Medications Ordered in Other Visits:      heparin lock flush 100 unit/mL injection 5 mL, 5 mL, Intracatheter, Q8H, Omar Mcnair MD, 5 mL at 11/25/24 0937    Allergies:   Allergies   Allergen Reactions     Amlodipine Other (See Comments)     Leg swelling       Review of Systems: A complete 10 system review was negative except as noted in the HPI above.    Physical Examination:  KPS: 80  Pain Score: 2 out of 10 at the  left forehead  General: Alert and in no acute distress.  HEENT: Normocephalic, atraumatic. No visible scleral icterus.  Neck: Apparent full range of motion.  CV: Appears well-perfused, with no visible cyanosis.  Lungs: Breathing easily on room air, with no difficulty completing full sentences  Extremities:  No visible edema of the upper extremities. Full range of motion at the shoulders and elbows.    Neuro: Alert and oriented; grossly nonfocal. Normal speech. Moving upper extremities equally.  Points to decrease sensation and burning in a triangle involving the cranial nerve V2 and V3 distribution on the right  Skin: No visible jaundice. No suspicious lesions of the visualized integuments.  Psych: Mood and affect are appropriate to given situation. Answers questions appropriately.      Data Review:    Labs:  Lab Results   Component Value Date    WBC 9.9 11/25/2024    HGB 9.7 (L) 11/25/2024    HCT 30.0 (L) 11/25/2024     11/25/2024    ALT 28 11/25/2024    AST 41 11/25/2024     11/25/2024    BUN 22.6 11/25/2024    CO2 23 11/25/2024    TSH 1.67 07/07/2021    PSA 38.17 (H) 11/25/2024    INR 1.01 11/21/2023    ALKPHOS 244 (H) 11/25/2024         Imaging:  PET PSMA, 9/26/2023:  IMPRESSION:  In this patient with metastatic castration resistant prostate  carcinoma currently on PLUVICTO, status post 3 cycles having worsening  symptoms:     1.  Mixed response to treatment, overall concerning for disease  progression given the presence of new lesions.   a) Increased radiotracer uptake in a left obturator lymph node, few  new osseous metastatic lesions, and progression of radiotracer  activity in some of the osseous metastatic lesions.  b) some of the osseous metastatic lesions demonstrate decreased  radiotracer uptake in comparison to PET/CT from 11/14/2022.   2. Decreased radiotracer uptake seen within the prostate gland  compared to prior exam.  3. Post operative changes of right sided nephrectomy.  4. Unchanged  large gastric hiatus hernia.  5. Please refer to separate report of high resolution PET/CT of the  head and neck.    Specifically regarding BONES:   Multifocal areas of radiotracer uptake are again noted throughout the  axial and appendicular skeleton. Few of these are new,  few  demonstrate decreased radiotracer uptake compared to prior and few  have progressed as described below:     New lesions:  -Sclerotic right parietal bone with mean SUV  5.3 (4/14).  -Left frontal bone sclerotic lesion with mean SUV 1.7 (4/26).  -Left femoral shaft with cortical medullary sclerotic lesions  having  mean SUV  9.8 (4/360).  - Right femoral shaft mean SUV of 6 (series 4/374)       Few progressed since for example:  -Sclerotic lesion in the left glenoid and the mean SUV uptake 10.8,  previously 8.8 (4/104)  -Left iliac bone sclerotic lesion having mean SUV  10.05, previously  18.4, however, lesion has grown in size on the current scan and  extends to involve the iliac ala (4/265).       Few improved lesions with decreased uptake e.g:  -Sclerotic right frontal bone lesion without significant uptake on  current scan (5/26).  -Right humeral neck sclerotic lesion with mean SUV 0.9, previously 5.7  (4/87).  -C7 sclerotic lesion with SUV mean 8.0, previously 23.0 (4/97).  -Expansile sclerotic lesion involving the right posterior and lateral  5th  rib with SUV mean 11.0, previously 54.0 (4/128).  -Sclerotic lesions involving L5-S1 vertebra and sacral ala having SUV  mean of 8.1, previously 26.6 (4/245).          CT CAP, 11/10/1/2023:  IMPRESSION:   1. Findings consistent with disease progression.  2. New subcentimeter right femoral neck and posterior right 12th rib  lesions.  3. Increased sclerotic involvement of T4 and T5.  4. Otherwise unchanged bony involvement of axial and appendicular  skeleton.  5. No pathologic fracture.  6. Right nephrectomy without local recurrence.          Impression and plan: Mr. 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  He now has onset of numbness and burning pain in the cranial nerve V2 and V3 distribution on the right as well as bilateral frontal tenderness to palpation at the forehead.  He would like to pursue palliative radiation to the sites.    We discussed the palliative nature of this approach.  We noted that there was dural enhancement, which could be reactive to osseous disease of the skull or could be representative of disease involvement.  As such, a whole brain radiation was discussed due to potentially address more widespread dural/leptomeningeal disease. However he wished to avoid whole brain radiation and instead would like to focus on known areas of osseous disease potentially responsible for his symptoms.     He will undergo CT simulation for radiation planning, with an anticipated 5 fraction course of palliative radiation to address potential osseous areas of concerns, including the osseous skull base course of cranial nerve V.    We appreciate the opportunity to participate in Mr. Reyes's care. He was encouraged to contact me with questions or concerns should they arise.    I personally reviewed the available CT and MRI images. Laboratory data and pathology as noted above was reviewed. I reviewed previous medical records, which are summarized in the HPI. A total of 40 minutes were spent (including face to face time) during this visit.     Lexis Albert MD MPH PhD    Department of Radiation  "Oncology                RADIATION ONCOLOGY FOLLOWUP RE-EVALUATION     Patient Name: Walter Reyes  Requesting Physician: Dr. 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     He was last at the time of completion of this left femur radiotherapy and presents today for followup and reevaluation regarding new areas of osseous concern.     Medical oncologist: Dr. Mcnair        Interval History:   Since he was last evaluated in our department,      On 2024, he called medical oncology reporting 1 week of new onset of frontal headaches and facial sensation changes in the right half of his face, with numbness and burning from his nose, upper lip and chin area as well as numbness at the maxillary teeth.  He described this as feeling \"Novocain numbness.\" eported onset of a burning feeling 5 minutes after touching his face on the right side.  He also reported right eyelid drooping and pain.  Headache was described as bitemporal, behind the eyes, and across the forehead.     On 2024, he began dexamethasone 8 Mg twice daily. After initiation of steroids, the eyelid drooping and right facial allodynia resolved.        Medications:   Current Medications   Current Outpatient Medications:      acetaminophen (TYLENOL) 325 MG tablet, Take 325-650 mg by mouth every 6 hours as needed for mild pain. Extra strength, Disp: , Rfl:      atorvastatin " (LIPITOR) 80 MG tablet, Take 40 mg by mouth daily., Disp: , Rfl:      BERBERINE CHLORIDE PO, , Disp: , Rfl:      calcium citrate-vitamin D (CITRACAL) 315-250 MG-UNIT TABS per tablet, Take 500 mg by mouth 2 times daily With 800 Vd per pt, Disp: , Rfl:      cycloSPORINE (RESTASIS) 0.05 % ophthalmic emulsion, Place 1 drop into both eyes 2 times daily , Disp: , Rfl:      dexAMETHasone (DECADRON) 4 MG tablet, Take 2 tablets (8 mg) by mouth 2 times daily (with meals)., Disp: 56 tablet, Rfl: 0     dexAMETHasone (DECADRON) 4 MG tablet, Take 2 tablets (8 mg) by mouth daily Take for 3 days, starting the day after chemo. Take with food. (Patient taking differently: Take 8 mg by mouth as needed. Take for 3 days, starting the day after chemo. Take with food.), Disp: 6 tablet, Rfl: 2     DULoxetine (CYMBALTA) 60 MG capsule, Take 1 capsule (60 mg) by mouth daily., Disp: 90 capsule, Rfl: 1     fentaNYL (DURAGESIC) 25 mcg/hr 72 hr patch, Place 1 patch onto the skin every 72 hours. Remove old patch when placing new patch., Disp: 10 patch, Rfl: 0     furosemide (LASIX) 20 MG tablet, Take 1 tablet (20 mg) by mouth daily (Patient not taking: Reported on 9/16/2024), Disp: 15 tablet, Rfl: 1     gabapentin (NEURONTIN) 300 MG capsule, Take 2 capsules (600 mg) by mouth every morning AND 3 capsules (900 mg) daily at 2 pm AND 3 capsules (900 mg) at bedtime., Disp: 240 capsule, Rfl: 3     ivermectin (STROMECTOL) 3 MG TABS tablet, , Disp: , Rfl:      levETIRAcetam (KEPPRA) 500 MG tablet, Take 1 tablet (500 mg) by mouth 2 times daily., Disp: 60 tablet, Rfl: 2     levothyroxine (SYNTHROID/LEVOTHROID) 112 MCG tablet, Take 112 mcg by mouth daily, Disp: , Rfl:      loratadine (CLARITIN) 10 MG tablet, Take 10 mg by mouth daily, Disp: , Rfl:      Multiple Vitamins-Minerals (MULTIVITAMIN ADULT EXTRA C PO), Take 1 tablet by mouth every 24 hours, Disp: , Rfl:      naloxone (NARCAN) 4 MG/0.1ML nasal spray, Spray 1 spray (4 mg) into one nostril alternating  nostrils as needed for opioid reversal every 2-3 minutes until assistance arrives, Disp: 0.2 mL, Rfl: 1     Nutritional Supplements (SALMON OIL) CAPS, Take 2 capsules by mouth daily , Disp: , Rfl:      omeprazole (PRILOSEC) 20 MG DR capsule, Take 20 mg by mouth daily, Disp: , Rfl:      ondansetron (ZOFRAN) 8 MG tablet, Take 1 tablet (8 mg) by mouth every 8 hours as needed for nausea (vomiting), Disp: 30 tablet, Rfl: 2     oxyCODONE (ROXICODONE) 5 MG tablet, Take 1-2 tablets (5-10 mg) by mouth every 3 hours as needed for severe pain., Disp: 90 tablet, Rfl: 0     prochlorperazine (COMPAZINE) 10 MG tablet, Take 1 tablet (10 mg) by mouth every 6 hours as needed for nausea or vomiting, Disp: 30 tablet, Rfl: 5     tamsulosin (FLOMAX) 0.4 MG capsule, Take 1 capsule (0.4 mg) by mouth daily, Disp: 30 capsule, Rfl: 3  No current facility-administered medications for this visit.     Facility-Administered Medications Ordered in Other Visits:      heparin lock flush 100 unit/mL injection 5 mL, 5 mL, Intracatheter, Q8H, Omar Mcnair MD, 5 mL at 11/25/24 0981        Allergies:   Allergies         Allergies   Allergen Reactions     Amlodipine Other (See Comments)       Leg swelling            Review of Systems: A complete 10 system review was negative except as noted in the HPI above.     Physical Examination:  KPS: 80  Pain Score: 2 out of 10 at the left forehead  General: Alert and in no acute distress.  HEENT: Normocephalic, atraumatic. No visible scleral icterus.  Neck: Apparent full range of motion.  CV: Appears well-perfused, with no visible cyanosis.  Lungs: Breathing easily on room air, with no difficulty completing full sentences  Extremities:  No visible edema of the upper extremities. Full range of motion at the shoulders and elbows.    Neuro: Alert and oriented; grossly nonfocal. Normal speech. Moving upper extremities equally.  Skin: No visible jaundice. No suspicious lesions of the visualized  integuments.  Psych: Mood and affect are appropriate to given situation. Answers questions appropriately.      Data Review:     Labs:        Lab Results   Component Value Date     WBC 9.9 11/25/2024     HGB 9.7 (L) 11/25/2024     HCT 30.0 (L) 11/25/2024      11/25/2024     ALT 28 11/25/2024     AST 41 11/25/2024      11/25/2024     BUN 22.6 11/25/2024     CO2 23 11/25/2024     TSH 1.67 07/07/2021     PSA 38.17 (H) 11/25/2024     INR 1.01 11/21/2023     ALKPHOS 244 (H) 11/25/2024            Imaging:    NM bone scan  11/20/2024  IMPRESSION: In this patient with history of metastatic prostate  cancer:  1. New focal lesion within the proximal right tibia.  2. Increased uptake within multiple lesions, including: Scattered  bilateral ribs, bilateral femoral head/neck, right mandible, right  inferior and superior lateral orbit.  3. Otherwise, numerous stable osseous metastatic lesions with  increased uptake.     Impression and plan: Mr. 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  Systemic therapy 7 included androgen deprivation therapy and docetaxel initiated in 2019, enzalutamide initiated in 11/20/2021, cabazitaxel, Pluvicto initiated in May 2023, and most recently 14 cycles of carboplatin with cabazitaxel, held 11/18/2024.       Plan:  1) Palliative radiotherapy to the bilateral posterior chest wall at an area of osseous involvement of the ribs and adjacent T4 and 5 spine,  to be delivered to 2000 cGy in 5 fractions.  Despite the palliative intent, IMRT may be required given the extensive treatment field and in order to minimize dose to normal adjacent structures including the lungs, heart, and esophagus.  2) Simulation will be performed in supine, arms at side, vaklok bag, scheduled for 11/29/2023.  We will plan to deliver this treatment before his next dose of chemotherapy, due 12/18/2023.        We appreciate the opportunity to participate in Mr. Reyes's care. He was encouraged to contact me with questions or concerns should they arise.     I personally reviewed the available CT and PSMA PET images. Laboratory data and pathology as noted above was reviewed. I reviewed previous medical records, which are summarized in the HPI. A total of 40 minutes were spent (including face to face time) during this visit, and more than 50% of the time was spent in counseling and coordination of care.      Lexis Albert MD MPH PhD    Department of Radiation Oncology           Lexis Albert

## 2024-11-29 NOTE — LETTER
11/29/2024      Walter Reyes  [address] [facility] [place] [facility] [address]      Dear Colleague,    Thank you for referring your patient, Walter Reyes, to the [facility] RADIATION ONCOLOGY. Please see a copy of my visit note below.    Simulation completed without incident.    Again, thank you for allowing me to participate in the care of your patient.        Sincerely,        Lexis Albert    Electronically signed

## 2024-11-29 NOTE — PROGRESS NOTES
"RADIATION ONCOLOGY FOLLOWUP RE-EVALUATION    Patient Name: Walter Reyes  Requesting Physician: Dr. 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    He was last at the time of completion of this left femur radiotherapy and presents today for followup and reevaluation regarding new areas of osseous concern.    Medical oncologist: Dr. Mcnair      Interval History:       On 2024, he called medical oncology reporting 1 week of new onset of frontal headaches and facial sensation changes in the right half of his face, with numbness and burning from his nose, upper lip and chin area as well as numbness at the maxillary teeth.  He described this as feeling \"Novocain numbness.\" eported onset of a burning feeling 5 minutes after touching his face on the right side.  He also reported right eyelid drooping and pain.  Headache was described as bitemporal, behind the eyes, and across the forehead.    On 2024, he began dexamethasone 8 Mg twice daily. After initiation of steroids, the eyelid drooping and right facial allodynia resolved.      Medications:   Current Outpatient Medications:     acetaminophen (TYLENOL) 325 MG tablet, Take 325-650 mg by mouth every 6 hours as needed for mild pain. Extra strength, Disp: , Rfl:     atorvastatin (LIPITOR) 80 MG tablet, Take 40 mg by mouth daily., Disp: , Rfl:     BERBERINE CHLORIDE PO, , Disp: , Rfl:     " calcium citrate-vitamin D (CITRACAL) 315-250 MG-UNIT TABS per tablet, Take 500 mg by mouth 2 times daily With 800 Vd per pt, Disp: , Rfl:     cycloSPORINE (RESTASIS) 0.05 % ophthalmic emulsion, Place 1 drop into both eyes 2 times daily , Disp: , Rfl:     dexAMETHasone (DECADRON) 4 MG tablet, Take 2 tablets (8 mg) by mouth 2 times daily (with meals)., Disp: 56 tablet, Rfl: 0    dexAMETHasone (DECADRON) 4 MG tablet, Take 2 tablets (8 mg) by mouth daily Take for 3 days, starting the day after chemo. Take with food. (Patient taking differently: Take 8 mg by mouth as needed. Take for 3 days, starting the day after chemo. Take with food.), Disp: 6 tablet, Rfl: 2    DULoxetine (CYMBALTA) 60 MG capsule, Take 1 capsule (60 mg) by mouth daily., Disp: 90 capsule, Rfl: 1    fentaNYL (DURAGESIC) 25 mcg/hr 72 hr patch, Place 1 patch onto the skin every 72 hours. Remove old patch when placing new patch., Disp: 10 patch, Rfl: 0    furosemide (LASIX) 20 MG tablet, Take 1 tablet (20 mg) by mouth daily (Patient not taking: Reported on 9/16/2024), Disp: 15 tablet, Rfl: 1    gabapentin (NEURONTIN) 300 MG capsule, Take 2 capsules (600 mg) by mouth every morning AND 3 capsules (900 mg) daily at 2 pm AND 3 capsules (900 mg) at bedtime., Disp: 240 capsule, Rfl: 3    ivermectin (STROMECTOL) 3 MG TABS tablet, , Disp: , Rfl:     levETIRAcetam (KEPPRA) 500 MG tablet, Take 1 tablet (500 mg) by mouth 2 times daily., Disp: 60 tablet, Rfl: 2    levothyroxine (SYNTHROID/LEVOTHROID) 112 MCG tablet, Take 112 mcg by mouth daily, Disp: , Rfl:     loratadine (CLARITIN) 10 MG tablet, Take 10 mg by mouth daily, Disp: , Rfl:     Multiple Vitamins-Minerals (MULTIVITAMIN ADULT EXTRA C PO), Take 1 tablet by mouth every 24 hours, Disp: , Rfl:     naloxone (NARCAN) 4 MG/0.1ML nasal spray, Spray 1 spray (4 mg) into one nostril alternating nostrils as needed for opioid reversal every 2-3 minutes until assistance arrives, Disp: 0.2 mL, Rfl: 1    Nutritional  Supplements (SALMON OIL) CAPS, Take 2 capsules by mouth daily , Disp: , Rfl:     omeprazole (PRILOSEC) 20 MG DR capsule, Take 20 mg by mouth daily, Disp: , Rfl:     ondansetron (ZOFRAN) 8 MG tablet, Take 1 tablet (8 mg) by mouth every 8 hours as needed for nausea (vomiting), Disp: 30 tablet, Rfl: 2    oxyCODONE (ROXICODONE) 5 MG tablet, Take 1-2 tablets (5-10 mg) by mouth every 3 hours as needed for severe pain., Disp: 90 tablet, Rfl: 0    prochlorperazine (COMPAZINE) 10 MG tablet, Take 1 tablet (10 mg) by mouth every 6 hours as needed for nausea or vomiting, Disp: 30 tablet, Rfl: 5    tamsulosin (FLOMAX) 0.4 MG capsule, Take 1 capsule (0.4 mg) by mouth daily, Disp: 30 capsule, Rfl: 3  No current facility-administered medications for this visit.    Facility-Administered Medications Ordered in Other Visits:     heparin lock flush 100 unit/mL injection 5 mL, 5 mL, Intracatheter, Q8H, Omar Mcnair MD, 5 mL at 11/25/24 0937    Allergies:   Allergies   Allergen Reactions    Amlodipine Other (See Comments)     Leg swelling       Review of Systems: A complete 10 system review was negative except as noted in the HPI above.    Physical Examination:  KPS: 80  Pain Score: 2 out of 10 at the left forehead  General: Alert and in no acute distress.  HEENT: Normocephalic, atraumatic. No visible scleral icterus.  Neck: Apparent full range of motion.  CV: Appears well-perfused, with no visible cyanosis.  Lungs: Breathing easily on room air, with no difficulty completing full sentences  Extremities:  No visible edema of the upper extremities. Full range of motion at the shoulders and elbows.    Neuro: Alert and oriented; grossly nonfocal. Normal speech. Moving upper extremities equally.  Skin: No visible jaundice. No suspicious lesions of the visualized integuments.  Psych: Mood and affect are appropriate to given situation. Answers questions appropriately.      Data Review:    Labs:  Lab Results   Component Value Date     WBC 9.9 11/25/2024    HGB 9.7 (L) 11/25/2024    HCT 30.0 (L) 11/25/2024     11/25/2024    ALT 28 11/25/2024    AST 41 11/25/2024     11/25/2024    BUN 22.6 11/25/2024    CO2 23 11/25/2024    TSH 1.67 07/07/2021    PSA 38.17 (H) 11/25/2024    INR 1.01 11/21/2023    ALKPHOS 244 (H) 11/25/2024         Imaging:  PET PSMA, 9/26/2023:  IMPRESSION:  In this patient with metastatic castration resistant prostate  carcinoma currently on PLUVICTO, status post 3 cycles having worsening  symptoms:     1.  Mixed response to treatment, overall concerning for disease  progression given the presence of new lesions.   a) Increased radiotracer uptake in a left obturator lymph node, few  new osseous metastatic lesions, and progression of radiotracer  activity in some of the osseous metastatic lesions.  b) some of the osseous metastatic lesions demonstrate decreased  radiotracer uptake in comparison to PET/CT from 11/14/2022.   2. Decreased radiotracer uptake seen within the prostate gland  compared to prior exam.  3. Post operative changes of right sided nephrectomy.  4. Unchanged large gastric hiatus hernia.  5. Please refer to separate report of high resolution PET/CT of the  head and neck.    Specifically regarding BONES:   Multifocal areas of radiotracer uptake are again noted throughout the  axial and appendicular skeleton. Few of these are new,  few  demonstrate decreased radiotracer uptake compared to prior and few  have progressed as described below:     New lesions:  -Sclerotic right parietal bone with mean SUV  5.3 (4/14).  -Left frontal bone sclerotic lesion with mean SUV 1.7 (4/26).  -Left femoral shaft with cortical medullary sclerotic lesions  having  mean SUV  9.8 (4/360).  - Right femoral shaft mean SUV of 6 (series 4/374)       Few progressed since for example:  -Sclerotic lesion in the left glenoid and the mean SUV uptake 10.8,  previously 8.8 (4/104)  -Left iliac bone sclerotic lesion having mean SUV   10.05, previously  18.4, however, lesion has grown in size on the current scan and  extends to involve the iliac ala (4/265).       Few improved lesions with decreased uptake e.g:  -Sclerotic right frontal bone lesion without significant uptake on  current scan (5/26).  -Right humeral neck sclerotic lesion with mean SUV 0.9, previously 5.7  (4/87).  -C7 sclerotic lesion with SUV mean 8.0, previously 23.0 (4/97).  -Expansile sclerotic lesion involving the right posterior and lateral  5th  rib with SUV mean 11.0, previously 54.0 (4/128).  -Sclerotic lesions involving L5-S1 vertebra and sacral ala having SUV  mean of 8.1, previously 26.6 (4/245).          CT CAP, 11/10/1/2023:  IMPRESSION:   1. Findings consistent with disease progression.  2. New subcentimeter right femoral neck and posterior right 12th rib  lesions.  3. Increased sclerotic involvement of T4 and T5.  4. Otherwise unchanged bony involvement of axial and appendicular  skeleton.  5. No pathologic fracture.  6. Right nephrectomy without local recurrence.          Impression and plan: Mr. 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  Unfortunately, he has had increased pain primarily along the right posterior upper chest wall with imaging showing an area of metastatic osseous involvement of the ribs and adjacent increasing sclerosis of T4 and T5 vertebral bodies, with some extension into the left ribs at this level as well.  PSA was also noted to increase from 2.21 in 10/2023 to 4.38 this month.  Given symptomatic progression with PSA rise, his next  dose of Pluvicto planned for this month was canceled.  He has plans to start his next line of systemic therapy on 11/27/2023, which is cabazitaexel and prednisone, delivered every 21 days under care of Dr. Mcnair.  He continues to receive Zometa and Lupron.    We recommended palliative radiation to the bilateral posterior chest wall at an area of osseous involvement of the ribs and adjacent T4 and 5 spine.  We explained the palliative, symptom-focused intention of such therapy.  We noted that the goal was to reduce pain attributable to cancer activity at the bone site.     Regarding potential treatment of the left forehead or left hip/iliac symptomatic areas, since these are not his dominant areas of pain at this time, we would defer consideration of treatment of these locations until after he starts his new chemotherapy.  These areas would potentially be amenable to palliative radiation, although attention would need to be taken to avoid overlap with his prior twice treated sacral area of concern for the left iliac metastasis.      We discussed the risks (short and long term as listed on consent), benefits, and alternatives to radiotherapy.  The risks of radiation include but are not limited to: pain flare, skin irritation, hair loss in the area treated, fibrosis, joint stiffness, edema, wound healing difficulties,  increased risk of fracture, damage to nerves, and tumors caused by radiation.  Informed consent was completed by telephone visit.    He expressed clear understanding of the palliative intent of therapy. Mr. Reyes is in agreement with this plan and will be scheduled for CT-based simulation for radiation planning.     Plan:  1) Palliative radiotherapy to the bilateral posterior chest wall at an area of osseous involvement of the ribs and adjacent T4 and 5 spine, to be delivered to 2000 cGy in 5 fractions.  Despite the palliative intent, IMRT may be required given the extensive treatment field and  in order to minimize dose to normal adjacent structures including the lungs, heart, and esophagus.  2) Simulation will be performed in supine, arms at side, vaklok bag, scheduled for 11/29/2023.  We will plan to deliver this treatment before his next dose of chemotherapy, due 12/18/2023.      We appreciate the opportunity to participate in Mr. Reyes's care. He was encouraged to contact me with questions or concerns should they arise.    I personally reviewed the available CT and PSMA PET images. Laboratory data and pathology as noted above was reviewed. I reviewed previous medical records, which are summarized in the HPI. A total of 40 minutes were spent (including face to face time) during this visit, and more than 50% of the time was spent in counseling and coordination of care.     Leixs Albert MD MPH PhD    Department of Radiation Oncology

## 2024-11-29 NOTE — LETTER
"11/29/2024      Walter Reyes  [address] [facility] [address]      Dear Colleague,    Thank you for referring your patient, Walter Reyes, to the [facility] RADIATION ONCOLOGY. Please see a copy of my visit note below.    Oncology Rooming Note    November 29, 2024 8:14 AM   Walter Reyes is a 64 year old male who presents for:    Chief Complaint   Patient presents with     Cancer     Follow-up   Metastatic prostate cancer and stage 3 clear cell RCC (s/p radical R nephrectomy 3/19/19) with no current concerns for recurrence.     Initial Vitals: /80 (BP Location: Left arm)   Pulse 96   Resp 16   Wt 106.9 kg (235 lb 9.6 oz)   SpO2 96%   BMI 32.86 kg/m   Estimated body mass index is 33.47 kg/m  as calculated from the following:    Height as of 11/26/24: 1.803 m (5' 11\").    Weight as of 11/26/24: 108.9 kg (240 lb). There is no height or weight on file to calculate BSA.  Data Unavailable Comment: Data Unavailable   No LMP for male patient.  Allergies reviewed: Yes  Medications reviewed: Yes    Medications: Medication refills not needed today.  Pharmacy name entered into EPIC:    [facility] [place] - [place], [place] - [address] [facility] [address] [facility] - [place], [place] - [address] [facility] [place], [place] - [address] [facility] [phone]  [facility] [place], [place] - [address] [facility] [place]    Frailty Screening:   Is the patient here for a new oncology consult visit in cancer care? 2. No    Pain  Current history of pain associated with this visit:   Intensity: 3/10 currently, but will awaken Pt in the middle of the night at an increased severity of 8/10 sometimes.   Pt reports there has been no decrease in the burning or numbness sensation.  The pain between his shoulders though is much improved.  Current: burning and numbness  Location: Pt's R side of the face in a triangular " shape starting underneath the R eye has ongoing sensations of numbness and burning, as well as the R nostril and teeth on the R side are numb.  Treatment: Dexamethasone 8 mg BID and Gabapentin was increased by 900 mg/day to a total dose of 2400 mg/day.  Pt uses a fentanyl patch and reports he has decreased his use of oxycodone to 5 mg at bedtime, whereas before he was needing it a couple times during the day.    Considerations for radiation treatment   Pregnancy status: Male   Implanted Cardiac Devices: Yes   Any previous radiation therapy: Yes  C7 received 3000 cGy in 10 fractions, completed 21.  Sacrum received 2500 cGy, completed 22.  Sacrum received 700 cGy, completed 23.  Left femur received 2000 cGy in 5 fractions, completed 10/10/23.  T3-T6 and bilateral ribs each received 2000 cGy in 5 fractions each, completed 23.     Clinical concerns: Pt is here to discuss the recommended radiation treatment plan and has chosen partial brain/skull radiation therapy with IMRT.      Dr. Albert was notified.      Mireya Navarro, RN              RADIATION ONCOLOGY FOLLOWUP RE-EVALUATION    Patient Name: Walter Reyes  Requesting Physician: Dr. 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      Medical oncologist: Dr. Mcnair      Interval History:       On 2024, he called medical oncology reporting 1 week of  "new onset of frontal headaches and facial sensation changes in the right half of his face, with numbness and burning from his nose, upper lip and chin area as well as numbness at the maxillary teeth.  He described this as feeling \"Novocain numbness.\" Reported onset of a burning feeling 5 minutes after touching his face on the right side.  Headache was described as bitemporal, behind the eyes, and across the forehead.    On 11/21/2024, he began dexamethasone 8 Mg twice daily. After initiation of steroids, the eyelid drooping and right facial allodynia resolved.      Medications:   Current Outpatient Medications:      acetaminophen (TYLENOL) 325 MG tablet, Take 325-650 mg by mouth every 6 hours as needed for mild pain. Extra strength, Disp: , Rfl:      atorvastatin (LIPITOR) 80 MG tablet, Take 40 mg by mouth daily., Disp: , Rfl:      BERBERINE CHLORIDE PO, , Disp: , Rfl:      calcium citrate-vitamin D (CITRACAL) 315-250 MG-UNIT TABS per tablet, Take 500 mg by mouth 2 times daily With 800 Vd per pt, Disp: , Rfl:      cycloSPORINE (RESTASIS) 0.05 % ophthalmic emulsion, Place 1 drop into both eyes 2 times daily , Disp: , Rfl:      dexAMETHasone (DECADRON) 4 MG tablet, Take 2 tablets (8 mg) by mouth 2 times daily (with meals)., Disp: 56 tablet, Rfl: 0     dexAMETHasone (DECADRON) 4 MG tablet, Take 2 tablets (8 mg) by mouth daily Take for 3 days, starting the day after chemo. Take with food. (Patient taking differently: Take 8 mg by mouth as needed. Take for 3 days, starting the day after chemo. Take with food.), Disp: 6 tablet, Rfl: 2     DULoxetine (CYMBALTA) 60 MG capsule, Take 1 capsule (60 mg) by mouth daily., Disp: 90 capsule, Rfl: 1     fentaNYL (DURAGESIC) 25 mcg/hr 72 hr patch, Place 1 patch onto the skin every 72 hours. Remove old patch when placing new patch., Disp: 10 patch, Rfl: 0     furosemide (LASIX) 20 MG tablet, Take 1 tablet (20 mg) by mouth daily (Patient not taking: Reported on 9/16/2024), Disp: 15 " tablet, Rfl: 1     gabapentin (NEURONTIN) 300 MG capsule, Take 2 capsules (600 mg) by mouth every morning AND 3 capsules (900 mg) daily at 2 pm AND 3 capsules (900 mg) at bedtime., Disp: 240 capsule, Rfl: 3     ivermectin (STROMECTOL) 3 MG TABS tablet, , Disp: , Rfl:      levETIRAcetam (KEPPRA) 500 MG tablet, Take 1 tablet (500 mg) by mouth 2 times daily., Disp: 60 tablet, Rfl: 2     levothyroxine (SYNTHROID/LEVOTHROID) 112 MCG tablet, Take 112 mcg by mouth daily, Disp: , Rfl:      loratadine (CLARITIN) 10 MG tablet, Take 10 mg by mouth daily, Disp: , Rfl:      Multiple Vitamins-Minerals (MULTIVITAMIN ADULT EXTRA C PO), Take 1 tablet by mouth every 24 hours, Disp: , Rfl:      naloxone (NARCAN) 4 MG/0.1ML nasal spray, Spray 1 spray (4 mg) into one nostril alternating nostrils as needed for opioid reversal every 2-3 minutes until assistance arrives, Disp: 0.2 mL, Rfl: 1     Nutritional Supplements (SALMON OIL) CAPS, Take 2 capsules by mouth daily , Disp: , Rfl:      omeprazole (PRILOSEC) 20 MG DR capsule, Take 20 mg by mouth daily, Disp: , Rfl:      ondansetron (ZOFRAN) 8 MG tablet, Take 1 tablet (8 mg) by mouth every 8 hours as needed for nausea (vomiting), Disp: 30 tablet, Rfl: 2     oxyCODONE (ROXICODONE) 5 MG tablet, Take 1-2 tablets (5-10 mg) by mouth every 3 hours as needed for severe pain., Disp: 90 tablet, Rfl: 0     prochlorperazine (COMPAZINE) 10 MG tablet, Take 1 tablet (10 mg) by mouth every 6 hours as needed for nausea or vomiting, Disp: 30 tablet, Rfl: 5     tamsulosin (FLOMAX) 0.4 MG capsule, Take 1 capsule (0.4 mg) by mouth daily, Disp: 30 capsule, Rfl: 3  No current facility-administered medications for this visit.    Facility-Administered Medications Ordered in Other Visits:      heparin lock flush 100 unit/mL injection 5 mL, 5 mL, Intracatheter, Q8H, Omar Mcnair MD, 5 mL at 11/25/24 0937    Allergies:   Allergies   Allergen Reactions     Amlodipine Other (See Comments)     Leg  swelling       Review of Systems: A complete 10 system review was negative except as noted in the HPI above.    Physical Examination:  KPS: 80  Pain Score: 2 out of 10 at the left forehead  General: Alert and in no acute distress.  HEENT: Normocephalic, atraumatic. No visible scleral icterus.  Neck: Apparent full range of motion.  CV: Appears well-perfused, with no visible cyanosis.  Lungs: Breathing easily on room air, with no difficulty completing full sentences  Extremities:  No visible edema of the upper extremities. Full range of motion at the shoulders and elbows.    Neuro: Alert and oriented; grossly nonfocal. Normal speech. Moving upper extremities equally.  Points to decrease sensation and burning in a triangle involving the cranial nerve V2 and V3 distribution on the right  Skin: No visible jaundice. No suspicious lesions of the visualized integuments.  Psych: Mood and affect are appropriate to given situation. Answers questions appropriately.      Data Review:    Labs:  Lab Results   Component Value Date    WBC 9.9 11/25/2024    HGB 9.7 (L) 11/25/2024    HCT 30.0 (L) 11/25/2024     11/25/2024    ALT 28 11/25/2024    AST 41 11/25/2024     11/25/2024    BUN 22.6 11/25/2024    CO2 23 11/25/2024    TSH 1.67 07/07/2021    PSA 38.17 (H) 11/25/2024    INR 1.01 11/21/2023    ALKPHOS 244 (H) 11/25/2024         Imaging:  PET PSMA, 9/26/2023:  IMPRESSION:  In this patient with metastatic castration resistant prostate  carcinoma currently on PLUVICTO, status post 3 cycles having worsening  symptoms:     1.  Mixed response to treatment, overall concerning for disease  progression given the presence of new lesions.   a) Increased radiotracer uptake in a left obturator lymph node, few  new osseous metastatic lesions, and progression of radiotracer  activity in some of the osseous metastatic lesions.  b) some of the osseous metastatic lesions demonstrate decreased  radiotracer uptake in comparison to PET/CT  from 11/14/2022.   2. Decreased radiotracer uptake seen within the prostate gland  compared to prior exam.  3. Post operative changes of right sided nephrectomy.  4. Unchanged large gastric hiatus hernia.  5. Please refer to separate report of high resolution PET/CT of the  head and neck.    Specifically regarding BONES:   Multifocal areas of radiotracer uptake are again noted throughout the  axial and appendicular skeleton. Few of these are new,  few  demonstrate decreased radiotracer uptake compared to prior and few  have progressed as described below:     New lesions:  -Sclerotic right parietal bone with mean SUV  5.3 (4/14).  -Left frontal bone sclerotic lesion with mean SUV 1.7 (4/26).  -Left femoral shaft with cortical medullary sclerotic lesions  having  mean SUV  9.8 (4/360).  - Right femoral shaft mean SUV of 6 (series 4/374)       Few progressed since for example:  -Sclerotic lesion in the left glenoid and the mean SUV uptake 10.8,  previously 8.8 (4/104)  -Left iliac bone sclerotic lesion having mean SUV  10.05, previously  18.4, however, lesion has grown in size on the current scan and  extends to involve the iliac ala (4/265).       Few improved lesions with decreased uptake e.g:  -Sclerotic right frontal bone lesion without significant uptake on  current scan (5/26).  -Right humeral neck sclerotic lesion with mean SUV 0.9, previously 5.7  (4/87).  -C7 sclerotic lesion with SUV mean 8.0, previously 23.0 (4/97).  -Expansile sclerotic lesion involving the right posterior and lateral  5th  rib with SUV mean 11.0, previously 54.0 (4/128).  -Sclerotic lesions involving L5-S1 vertebra and sacral ala having SUV  mean of 8.1, previously 26.6 (4/245).          CT CAP, 11/10/1/2023:  IMPRESSION:   1. Findings consistent with disease progression.  2. New subcentimeter right femoral neck and posterior right 12th rib  lesions.  3. Increased sclerotic involvement of T4 and T5.  4. Otherwise unchanged bony involvement  of axial and appendicular  skeleton.  5. No pathologic fracture.  6. Right nephrectomy without local recurrence.          Impression and plan: Mr. 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  He now has onset of numbness and burning pain in the cranial nerve V2 and V3 distribution on the right as well as bilateral frontal tenderness to palpation at the forehead.  He would like to pursue palliative radiation to the sites.    We discussed the palliative nature of this approach.  We noted that there was dural enhancement, which could be reactive to osseous disease of the skull or could be representative of disease involvement.  As such, a whole brain radiation was discussed due to potentially address more widespread dural/leptomeningeal disease. However he wished to avoid whole brain radiation and instead would like to focus on known areas of osseous disease potentially responsible for his symptoms.     He will undergo CT simulation for radiation planning, with an anticipated 5 fraction course of palliative radiation to address potential osseous areas of concerns, including the osseous skull base course of cranial nerve V.    We appreciate the opportunity to participate in Mr. Reyes's care. He was encouraged to contact me with questions or concerns should they arise.    I personally reviewed the available CT and MRI images. Laboratory data and pathology as noted above was reviewed. I reviewed previous medical records, which are summarized in  "the HPI. A total of 40 minutes were spent (including face to face time) during this visit.     Lexis Albert MD MPH PhD    Department of Radiation Oncology                RADIATION ONCOLOGY FOLLOWUP RE-EVALUATION     Patient Name: Walter Reyes  Requesting Physician: Dr. 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     He was last at the time of completion of this left femur radiotherapy and presents today for followup and reevaluation regarding new areas of osseous concern.     Medical oncologist: Dr. Mcnair        Interval History:   Since he was last evaluated in our department,      On 2024, he called medical oncology reporting 1 week of new onset of frontal headaches and facial sensation changes in the right half of his face, with numbness and burning from his nose, upper lip and chin area as well as numbness at the maxillary teeth.  He described this as feeling \"Novocain numbness.\" eported onset of a burning feeling 5 minutes after touching his face on the right side.  He also reported right eyelid drooping and pain.  Headache was described as bitemporal, behind the eyes, and across the forehead.     On 2024, he began dexamethasone 8 Mg twice daily. After initiation of steroids, the eyelid drooping and right facial allodynia resolved.        Medications:   Current Medications   Current Outpatient " Medications:      acetaminophen (TYLENOL) 325 MG tablet, Take 325-650 mg by mouth every 6 hours as needed for mild pain. Extra strength, Disp: , Rfl:      atorvastatin (LIPITOR) 80 MG tablet, Take 40 mg by mouth daily., Disp: , Rfl:      BERBERINE CHLORIDE PO, , Disp: , Rfl:      calcium citrate-vitamin D (CITRACAL) 315-250 MG-UNIT TABS per tablet, Take 500 mg by mouth 2 times daily With 800 Vd per pt, Disp: , Rfl:      cycloSPORINE (RESTASIS) 0.05 % ophthalmic emulsion, Place 1 drop into both eyes 2 times daily , Disp: , Rfl:      dexAMETHasone (DECADRON) 4 MG tablet, Take 2 tablets (8 mg) by mouth 2 times daily (with meals)., Disp: 56 tablet, Rfl: 0     dexAMETHasone (DECADRON) 4 MG tablet, Take 2 tablets (8 mg) by mouth daily Take for 3 days, starting the day after chemo. Take with food. (Patient taking differently: Take 8 mg by mouth as needed. Take for 3 days, starting the day after chemo. Take with food.), Disp: 6 tablet, Rfl: 2     DULoxetine (CYMBALTA) 60 MG capsule, Take 1 capsule (60 mg) by mouth daily., Disp: 90 capsule, Rfl: 1     fentaNYL (DURAGESIC) 25 mcg/hr 72 hr patch, Place 1 patch onto the skin every 72 hours. Remove old patch when placing new patch., Disp: 10 patch, Rfl: 0     furosemide (LASIX) 20 MG tablet, Take 1 tablet (20 mg) by mouth daily (Patient not taking: Reported on 9/16/2024), Disp: 15 tablet, Rfl: 1     gabapentin (NEURONTIN) 300 MG capsule, Take 2 capsules (600 mg) by mouth every morning AND 3 capsules (900 mg) daily at 2 pm AND 3 capsules (900 mg) at bedtime., Disp: 240 capsule, Rfl: 3     ivermectin (STROMECTOL) 3 MG TABS tablet, , Disp: , Rfl:      levETIRAcetam (KEPPRA) 500 MG tablet, Take 1 tablet (500 mg) by mouth 2 times daily., Disp: 60 tablet, Rfl: 2     levothyroxine (SYNTHROID/LEVOTHROID) 112 MCG tablet, Take 112 mcg by mouth daily, Disp: , Rfl:      loratadine (CLARITIN) 10 MG tablet, Take 10 mg by mouth daily, Disp: , Rfl:      Multiple Vitamins-Minerals (MULTIVITAMIN  ADULT EXTRA C PO), Take 1 tablet by mouth every 24 hours, Disp: , Rfl:      naloxone (NARCAN) 4 MG/0.1ML nasal spray, Spray 1 spray (4 mg) into one nostril alternating nostrils as needed for opioid reversal every 2-3 minutes until assistance arrives, Disp: 0.2 mL, Rfl: 1     Nutritional Supplements (SALMON OIL) CAPS, Take 2 capsules by mouth daily , Disp: , Rfl:      omeprazole (PRILOSEC) 20 MG DR capsule, Take 20 mg by mouth daily, Disp: , Rfl:      ondansetron (ZOFRAN) 8 MG tablet, Take 1 tablet (8 mg) by mouth every 8 hours as needed for nausea (vomiting), Disp: 30 tablet, Rfl: 2     oxyCODONE (ROXICODONE) 5 MG tablet, Take 1-2 tablets (5-10 mg) by mouth every 3 hours as needed for severe pain., Disp: 90 tablet, Rfl: 0     prochlorperazine (COMPAZINE) 10 MG tablet, Take 1 tablet (10 mg) by mouth every 6 hours as needed for nausea or vomiting, Disp: 30 tablet, Rfl: 5     tamsulosin (FLOMAX) 0.4 MG capsule, Take 1 capsule (0.4 mg) by mouth daily, Disp: 30 capsule, Rfl: 3  No current facility-administered medications for this visit.     Facility-Administered Medications Ordered in Other Visits:      heparin lock flush 100 unit/mL injection 5 mL, 5 mL, Intracatheter, Q8H, Omar Mcnair MD, 5 mL at 11/25/24 0937        Allergies:   Allergies         Allergies   Allergen Reactions     Amlodipine Other (See Comments)       Leg swelling            Review of Systems: A complete 10 system review was negative except as noted in the HPI above.     Physical Examination:  KPS: 80  Pain Score: 2 out of 10 at the left forehead  General: Alert and in no acute distress.  HEENT: Normocephalic, atraumatic. No visible scleral icterus.  Neck: Apparent full range of motion.  CV: Appears well-perfused, with no visible cyanosis.  Lungs: Breathing easily on room air, with no difficulty completing full sentences  Extremities:  No visible edema of the upper extremities. Full range of motion at the shoulders and elbows.     Neuro: Alert and oriented; grossly nonfocal. Normal speech. Moving upper extremities equally.  Skin: No visible jaundice. No suspicious lesions of the visualized integuments.  Psych: Mood and affect are appropriate to given situation. Answers questions appropriately.      Data Review:     Labs:        Lab Results   Component Value Date     WBC 9.9 11/25/2024     HGB 9.7 (L) 11/25/2024     HCT 30.0 (L) 11/25/2024      11/25/2024     ALT 28 11/25/2024     AST 41 11/25/2024      11/25/2024     BUN 22.6 11/25/2024     CO2 23 11/25/2024     TSH 1.67 07/07/2021     PSA 38.17 (H) 11/25/2024     INR 1.01 11/21/2023     ALKPHOS 244 (H) 11/25/2024            Imaging:    NM bone scan  11/20/2024  IMPRESSION: In this patient with history of metastatic prostate  cancer:  1. New focal lesion within the proximal right tibia.  2. Increased uptake within multiple lesions, including: Scattered  bilateral ribs, bilateral femoral head/neck, right mandible, right  inferior and superior lateral orbit.  3. Otherwise, numerous stable osseous metastatic lesions with  increased uptake.     Impression and plan: Mr. 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  Systemic therapy 7 included androgen deprivation therapy and docetaxel initiated in 2019, enzalutamide initiated in 11/20/2021, cabazitaxel, Pluvicto initiated in May 2023, and most recently 14 cycles of carboplatin with cabazitaxel, held  11/18/2024.       Plan:  1) Palliative radiotherapy to the bilateral posterior chest wall at an area of osseous involvement of the ribs and adjacent T4 and 5 spine, to be delivered to 2000 cGy in 5 fractions.  Despite the palliative intent, IMRT may be required given the extensive treatment field and in order to minimize dose to normal adjacent structures including the lungs, heart, and esophagus.  2) Simulation will be performed in supine, arms at side, vaklok bag, scheduled for 11/29/2023.  We will plan to deliver this treatment before his next dose of chemotherapy, due 12/18/2023.        We appreciate the opportunity to participate in Mr. Reyes's care. He was encouraged to contact me with questions or concerns should they arise.     I personally reviewed the available CT and PSMA PET images. Laboratory data and pathology as noted above was reviewed. I reviewed previous medical records, which are summarized in the HPI. A total of 40 minutes were spent (including face to face time) during this visit, and more than 50% of the time was spent in counseling and coordination of care.      Lexis Albert MD MPH PhD    Department of Radiation Oncology         Again, thank you for allowing me to participate in the care of your patient.        Sincerely,        Lexis Albert    Electronically signed

## 2024-11-29 NOTE — PROGRESS NOTES
"RADIATION ONCOLOGY FOLLOWUP RE-EVALUATION     Patient Name: Walter Reyes  Requesting Physician: Dr. 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     He was last at the time of completion of this left femur radiotherapy and presents today for followup and reevaluation regarding new areas of osseous concern.     Medical oncologist: Dr. Mcnair        Interval History:   Since he was last evaluated in our department,      On 2024, he called medical oncology reporting 1 week of new onset of frontal headaches and facial sensation changes in the right half of his face, with numbness and burning from his nose, upper lip and chin area as well as numbness at the maxillary teeth.  He described this as feeling \"Novocain numbness.\" eported onset of a burning feeling 5 minutes after touching his face on the right side.  He also reported right eyelid drooping and pain.  Headache was described as bitemporal, behind the eyes, and across the forehead.     On 2024, he began dexamethasone 8 Mg twice daily. After initiation of steroids, the eyelid drooping and right facial allodynia resolved.        Medications:   Current Medications   Current Outpatient Medications:     acetaminophen (TYLENOL) 325 MG tablet, Take 325-650 mg by mouth every 6 hours as needed for mild pain. Extra strength, Disp: , Rfl:     atorvastatin (LIPITOR) 80 MG tablet, Take 40 mg " by mouth daily., Disp: , Rfl:     BERBERINE CHLORIDE PO, , Disp: , Rfl:     calcium citrate-vitamin D (CITRACAL) 315-250 MG-UNIT TABS per tablet, Take 500 mg by mouth 2 times daily With 800 Vd per pt, Disp: , Rfl:     cycloSPORINE (RESTASIS) 0.05 % ophthalmic emulsion, Place 1 drop into both eyes 2 times daily , Disp: , Rfl:     dexAMETHasone (DECADRON) 4 MG tablet, Take 2 tablets (8 mg) by mouth 2 times daily (with meals)., Disp: 56 tablet, Rfl: 0    dexAMETHasone (DECADRON) 4 MG tablet, Take 2 tablets (8 mg) by mouth daily Take for 3 days, starting the day after chemo. Take with food. (Patient taking differently: Take 8 mg by mouth as needed. Take for 3 days, starting the day after chemo. Take with food.), Disp: 6 tablet, Rfl: 2    DULoxetine (CYMBALTA) 60 MG capsule, Take 1 capsule (60 mg) by mouth daily., Disp: 90 capsule, Rfl: 1    fentaNYL (DURAGESIC) 25 mcg/hr 72 hr patch, Place 1 patch onto the skin every 72 hours. Remove old patch when placing new patch., Disp: 10 patch, Rfl: 0    furosemide (LASIX) 20 MG tablet, Take 1 tablet (20 mg) by mouth daily (Patient not taking: Reported on 9/16/2024), Disp: 15 tablet, Rfl: 1    gabapentin (NEURONTIN) 300 MG capsule, Take 2 capsules (600 mg) by mouth every morning AND 3 capsules (900 mg) daily at 2 pm AND 3 capsules (900 mg) at bedtime., Disp: 240 capsule, Rfl: 3    ivermectin (STROMECTOL) 3 MG TABS tablet, , Disp: , Rfl:     levETIRAcetam (KEPPRA) 500 MG tablet, Take 1 tablet (500 mg) by mouth 2 times daily., Disp: 60 tablet, Rfl: 2    levothyroxine (SYNTHROID/LEVOTHROID) 112 MCG tablet, Take 112 mcg by mouth daily, Disp: , Rfl:     loratadine (CLARITIN) 10 MG tablet, Take 10 mg by mouth daily, Disp: , Rfl:     Multiple Vitamins-Minerals (MULTIVITAMIN ADULT EXTRA C PO), Take 1 tablet by mouth every 24 hours, Disp: , Rfl:     naloxone (NARCAN) 4 MG/0.1ML nasal spray, Spray 1 spray (4 mg) into one nostril alternating nostrils as needed for opioid reversal every 2-3  minutes until assistance arrives, Disp: 0.2 mL, Rfl: 1    Nutritional Supplements (SALMON OIL) CAPS, Take 2 capsules by mouth daily , Disp: , Rfl:     omeprazole (PRILOSEC) 20 MG DR capsule, Take 20 mg by mouth daily, Disp: , Rfl:     ondansetron (ZOFRAN) 8 MG tablet, Take 1 tablet (8 mg) by mouth every 8 hours as needed for nausea (vomiting), Disp: 30 tablet, Rfl: 2    oxyCODONE (ROXICODONE) 5 MG tablet, Take 1-2 tablets (5-10 mg) by mouth every 3 hours as needed for severe pain., Disp: 90 tablet, Rfl: 0    prochlorperazine (COMPAZINE) 10 MG tablet, Take 1 tablet (10 mg) by mouth every 6 hours as needed for nausea or vomiting, Disp: 30 tablet, Rfl: 5    tamsulosin (FLOMAX) 0.4 MG capsule, Take 1 capsule (0.4 mg) by mouth daily, Disp: 30 capsule, Rfl: 3  No current facility-administered medications for this visit.     Facility-Administered Medications Ordered in Other Visits:     heparin lock flush 100 unit/mL injection 5 mL, 5 mL, Intracatheter, Q8H, Omar Mcnair MD, 5 mL at 11/25/24 0937        Allergies:   Allergies         Allergies   Allergen Reactions    Amlodipine Other (See Comments)       Leg swelling            Review of Systems: A complete 10 system review was negative except as noted in the HPI above.     Physical Examination:  KPS: 80  Pain Score: 2 out of 10 at the left forehead  General: Alert and in no acute distress.  HEENT: Normocephalic, atraumatic. No visible scleral icterus.  Neck: Apparent full range of motion.  CV: Appears well-perfused, with no visible cyanosis.  Lungs: Breathing easily on room air, with no difficulty completing full sentences  Extremities:  No visible edema of the upper extremities. Full range of motion at the shoulders and elbows.    Neuro: Alert and oriented; grossly nonfocal. Normal speech. Moving upper extremities equally.  Skin: No visible jaundice. No suspicious lesions of the visualized integuments.  Psych: Mood and affect are appropriate to given  situation. Answers questions appropriately.      Data Review:     Labs:        Lab Results   Component Value Date     WBC 9.9 11/25/2024     HGB 9.7 (L) 11/25/2024     HCT 30.0 (L) 11/25/2024      11/25/2024     ALT 28 11/25/2024     AST 41 11/25/2024      11/25/2024     BUN 22.6 11/25/2024     CO2 23 11/25/2024     TSH 1.67 07/07/2021     PSA 38.17 (H) 11/25/2024     INR 1.01 11/21/2023     ALKPHOS 244 (H) 11/25/2024            Imaging:    NM bone scan  11/20/2024  IMPRESSION: In this patient with history of metastatic prostate  cancer:  1. New focal lesion within the proximal right tibia.  2. Increased uptake within multiple lesions, including: Scattered  bilateral ribs, bilateral femoral head/neck, right mandible, right  inferior and superior lateral orbit.  3. Otherwise, numerous stable osseous metastatic lesions with  increased uptake.     Impression and plan: Mr. 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  Systemic therapy 7 included androgen deprivation therapy and docetaxel initiated in 2019, enzalutamide initiated in 11/20/2021, cabazitaxel, Pluvicto initiated in May 2023, and most recently 14 cycles of carboplatin with cabazitaxel, held 11/18/2024.       Plan:  1) Palliative radiotherapy to the bilateral posterior chest wall at an area of osseous involvement of the ribs and adjacent T4 and 5 spine, to be delivered to 2000 cGy in 5 fractions.  Despite the  palliative intent, IMRT may be required given the extensive treatment field and in order to minimize dose to normal adjacent structures including the lungs, heart, and esophagus.  2) Simulation will be performed in supine, arms at side, vaklok bag, scheduled for 11/29/2023.  We will plan to deliver this treatment before his next dose of chemotherapy, due 12/18/2023.        We appreciate the opportunity to participate in Mr. Reyes's care. He was encouraged to contact me with questions or concerns should they arise.     I personally reviewed the available CT and PSMA PET images. Laboratory data and pathology as noted above was reviewed. I reviewed previous medical records, which are summarized in the HPI. A total of 40 minutes were spent (including face to face time) during this visit, and more than 50% of the time was spent in counseling and coordination of care.      Lexis Albert MD MPH PhD    Department of Radiation Oncology

## 2024-12-02 NOTE — PROGRESS NOTES
"    [facility] Oncology Followup  Nov 25, 2024    REASON FOR VISIT: Follow-up for metastatic castration-resistant prostate cancer.      INTERVAL HISTORY:   Walter returns to clinic today for close follow up. Not feeling great the past few days in particular since starting Keppra.  Face is more numb, particularly in a triangle-shaped area next to and inferior to his nose.  Facial numbness is uncomfortable.  Neuropathy is otherwise about the same.  Appetite isn't great.  Would like to restart ivermectin.  Hasn't heard from radiation oncology regarding whole brain irradiation.      Review of Systems:  Remainder of 12 point ROS reviewed and otherwise negative except as in interval history.     ONCOLOGY HISTORY: Mr. 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 His oncologic history is detailed below.     1. 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 Naylor at [facility]. Prior PSA were 1.4 on 8/16/17, 2.4 on 6/28/16, 2.9 on 6/28/16, and as low as 0.4 on 3/26/2003.   - 1/08/2019: Consultation with Sarah Wilson CNP in Urology clinic. Repeat PSA 9.6.  - 1/16/2019: MRI prostate with contrast - \"This examination is characterized as PIRADS 5- very high probability. Clinically significant cancer is highly likely to be present. There is a large, invasive mass arising from the right peripheral zone and extending into the neurovascular bundle, seminal vesicle, and along the anterolateral right mesorectal fascia. Metastatic right external iliac lymph node. Metastatic lesion in the posterior/superior right acetabulum.\"  - 1/25/2019: CT " "abdomen and pelvis with IV contrast - \"1. Heterogeneous enhancing mass posteriorly in the upper pole of the right kidney measures 4.5 x 5.8 x 5.7 cm (AP by transverse by craniocaudal). It has a small nodular component extending posterior medially which abuts the right psoas muscle. This nodular extension measures 2.1 x 2.1 cm. Minimal stranding about the mass. No definite thrombus within the right renal vein. This renal mass is compatible with a renal cell carcinoma. A paraaortic lymph node situated immediately posterior to the left renal vein measures 1.1 cm in short axis, suspicious for metastasis. 2. A 2 cm right external iliac lymph node is also suspicious for metastases. 3. Multiple sclerotic osseous lesions suspicious for metastases. These include 0.8 and 0.6 cm sclerotic lesions laterally in the right iliac wing (images 53 and 62 respectively). Ill-defined groundglass density laterally in the right acetabulum measuring 1.7 x 1.2 cm corresponds with the lesion identified on MRI. A 0.4 cm groundglass density in the left acetabulum. Sclerotic lesion in the left femoral neck measures 0.9 x 1.6 cm (image 81). Sclerotic metastases would be more compatible with prostate metastases. 4. A 3 subcentimeter hepatic lesions are indeterminate. Metastases would be a consideration. These can be further characterized with liver MRI.\"  - 1/25/2019: NM bone scan - \"There is focal bony uptake in the left femoral neck, right acetabulum, right of midline at the S1 or L5 level of the spine, within multiple bilateral ribs, in the C7 or T1 level of the spine, and anteriorly within the skull. Findings are suspicious for metastases.\"  - 1/31/19: CT chest with contrast - \" No suspicious nodules in the chest.  Stable appearance of a 5.8 cm right renal mass concerning for renal carcinoma until proven otherwise.  Stable indeterminant subcentimeter hypodensities in the liver.  Multifocal osteoblastic metastasis including 2.5 cm lesion in " "the right fifth rib posteriorly and a 1.6 cm lesion in the right third rib posteriorly. No lytic lesions identified.\"  - 2/6/19: CT-guided right sclerotic fifth rib lesion biopsy - \"Metastatic carcinoma, consistent with prostate primary.  Immunohistochemical stains performed show the metastatic carcinoma stains positive for NKX3.1 (prostate marker), negative for STACIE 3 and PAX8, which supports the above diagnosis.\"  - 2/15/19: Started Casodex 50mg every day and consented for the biobanking protocol. 3/18/19 - stopped Casodex.  - 2/19/19: Case discussed in tumor board - recommendation for nephectomy for suspected malignant right renal mass.   - 2/26/19: Started Lupron 22.5mg every 3 months.   - 5/8/19: Started Docetaxel 75mg/m2 IV every 3 weeks. 06/18/19 - cycle 3, 7/10/19 - cycle 4, 07/31/19 - cycle 5, 08/21/19 - cycle 6.   - 10/2/19: Restaging CT CAP and NM Bone Scan showed improved osseous disease, no evidence of recurrent or new metastatic disease.  - 1/5/20: Restaging CT CAP and NM Bone Scan showed stable osseous disease, no evidence of recurrent or new metastatic disease.  - 4/6/20: NM bone scan with slightly improved uptake in known skeletal mets. CT C/A/P with contrast with stable osseous mets, no yusuf enlargement, no new visceral mets etc.  - 04/08/20: Zometa every 3 months.  - 10/5/20: CT C/A/P with contrast and NM bone scan - SD.   - 1/4/21: CT C/A/P with contrast and NM bone scan - SD but slight increase in right 5th rib tracer uptake and in posterior L5 sclerosis. 1/6/21  PSA = 0.29  - 03/29/21: CT C/A/P with contrast - single new right sacral 8mm bone lesion (suspicious), other bone mets stable. No visceral/yusuf disease. Nephrectomy site without recurrence. NM bone scan - SD but \"increased uptake associated with the prior lesions of the lower cervical spine, posterior right fourth rib and right L5 posterior arch elements. Otherwise unchanged uptake associated with the proximal left femur and left " "anterior fourth rib. Similar uptake of the left halux, possibly secondary to degenerative osteoarthritis or gout.\"    3/31/21 PSA = 0.44  7/7/21  PSA = 0.47  11/2021 PSA = 1.05  -- Start XTANDI  12/16/21 PSA =0.22  2/11/22 PSA= 0.50  3/22/22 PSA=0.72  5/5/2022 PSA=1.79  7/11/2022 PSA=2.16 --Start cycle 1 docetaxel   8/22/22 PSA = 1.36  9/12/22 PSA = 1.09  10/24/22 Cycle #6 of Docetaxel. End of treatment  1/9/23  PSA =0.66  3/20/23  PSA=1.54  4/21/23 PSA = 1.81  T=15  5/22/23 C1 Pluvicto   06/07/23          PSA = 1.42  7/18/23 PSA=1.75, C2 Pluvicto   8/28/23 Transfer of care initial visit for Tabby.  PSA 2.06.  C3 Pluvicto scheduled for 8/30.  Proceed with dose as planned.  MR L femur, ortho referral, PSMA PET ordered for prior to follow-up.    10/2/23 Palliative RT to L femur metastasis complete.  PSMA PET with mixed response.  PSA 2.21.  RT to L femur pending.  Continue with Dose #4 of Pluvicto despite mixed response to therapy.  Dex course for possible pain flare with RT.    11/8/23  Follow-up prior to Dose #5 Pluvicto.  More pain in chest/ribs/back. PSA 4.38.  Testosterone=3.  Rad onc referral for ribs.  Biopsy progressive lesion for progression on Pluvicto.  Cabazitaxel scheduled for follow-up appointment.  Tempus blood testing unrevealing for targetable mutation.    11/27/23 Bx completed from R iliac crest but unrevealing for either PCa or RCC.  Start cabazitaxel C1D1.  PSA 2.81.    12/19/23 PSA = 3.66  1/8/24 PSA = 3.55  1/29/24 PSA = 3.07  2/19/24 PSA= 2.21-Start carboplatin/cabazitaxel.    3/11/24 PSA= C2 Carbo/cabazi.  PSA 1.99.    4/01/24 PSA= 1.74. C3 Carbo/cabazi.   4/22/24 PSA= 1.85. C4 Carbo/cabazi.   5/13/24 PSA = 2.06. C5 Carbo/cabazi.   6/3/24 PSA = 2.68, cycle 6 Carboplatin/cabazitaxel  6/24/24 PSA 3.51, C7 carboplatin/cabazitaxel.  Bony lesions stable on NM bone scan.  Reticulonodular densities noted in lungs as possible infection vs drug reaction.  Add chromogranin A to next labs.   7/15/24 PSA= " "5.07, chromogranin A pending, cycle 8  8/5/24 PSA= 7.12, Cycle 9 carboplatin/cabazitaxel  8/26/24 PSA= 9.88, cycle 10 carbo/cabazitaxel  9/16/24 PSA= 16.53, cycle 11 carbo/cabazitaxel. Dose reduce carboplatin from AUC 4 to AUC 3 due to fatigue, anorexia, and peripheral neuropathy.   10/7/24 PSA= 19.  Cycle 12 carbo/cabazitaxel given on 10/8/2024.   10/28/24 PSA= 24.36. Cycle 13 carboplatin/cabazitaxel.   11/18/24 PSA= 26, Cycle 14 carboplatin/cabazitaxel- HELD   12/5/24  PSA 38.17, chromogranin A elevated at 500.  Pending whole brain irradiation.  NM bone scan with evidence of bony progression.  MR brain with concern for pachymeningeal enhancement possibly related to brain metastases.      Radiation history:   -C7         3,000 cGy       06 X      8/05/2021        8/18/2021        13       10  -2 Sacrum          2,500 cGy       18 X      4/12/2022        4/18/2022         6           -Sacrum retreat               700 cGy        18 X      2/28/2023        2/28/2023  -Left femur to 2000 cGy in 5 fractions, completed 10/10/2023   -bilateral posterior chest wall at an area of osseous involvement of the ribs and adjacent T4 and 5 spine, to be delivered to 2000 cGy in 5 fractions.  completed 12/13-12/19/23.    2. Stage III ([id number]), grade 3 of 4, clear-cell RCC of right kidney:  - Incidentally diagnosed as above.   - Underwent curative-intent robotic right radical nephrectomy with Dr. Wesley Cleveland on 3/19/19. No tumor spillage per op report.   - Path showed: \"Histologic Type: Clear cell renal cell carcinoma; Sarcomatoid Features: Not identified; Histologic Grade: Nucleolar grade 3 (WHO/ISUP). Extent Tumor Size: 4.3 cm. Microscopic Tumor Extension: Tumor extension into renal sinus (in vascular structures). Margins: Negative. Tumor Necrosis: Present; focal. Lymph-Vascular Invasion: Not identified.  Pathologic Staging (pTNM) Primary Tumor (pT): pT3a: Tumor extends into renal vein branches/renal sinus. Regional Lymph " "Nodes (pN): pNX. Number of Lymph Nodes Examined: 0 Distant Metastasis (pM): pM N/A.\"  - Restaging scans as above.  No current concerns for recurrence.      PAST MEDICAL HISTORY:  Past Medical History:   Diagnosis Date    Cancer of kidney, right (H)     Complication of anesthesia     slow wakeup     Malignant neoplasm of prostate metastatic to bone (H) 2/14/2019    Thyroid disease      CURRENT MEDICATIONS:   Current Outpatient Medications:     acetaminophen (TYLENOL) 325 MG tablet, Take 325-650 mg by mouth every 6 hours as needed for mild pain. Extra strength, Disp: , Rfl:     atorvastatin (LIPITOR) 80 MG tablet, Take 40 mg by mouth daily., Disp: , Rfl:     BERBERINE CHLORIDE PO, , Disp: , Rfl:     calcium citrate-vitamin D (CITRACAL) 315-250 MG-UNIT TABS per tablet, Take 500 mg by mouth 2 times daily With 800 Vd per pt, Disp: , Rfl:     cycloSPORINE (RESTASIS) 0.05 % ophthalmic emulsion, Place 1 drop into both eyes 2 times daily , Disp: , Rfl:     dexAMETHasone (DECADRON) 4 MG tablet, Take 2 tablets (8 mg) by mouth 2 times daily (with meals)., Disp: 56 tablet, Rfl: 0    dexAMETHasone (DECADRON) 4 MG tablet, Take 2 tablets (8 mg) by mouth daily Take for 3 days, starting the day after chemo. Take with food. (Patient not taking: Reported on 11/29/2024), Disp: 6 tablet, Rfl: 2    DULoxetine (CYMBALTA) 60 MG capsule, Take 1 capsule (60 mg) by mouth daily., Disp: 90 capsule, Rfl: 1    fentaNYL (DURAGESIC) 25 mcg/hr 72 hr patch, Place 1 patch onto the skin every 72 hours. Remove old patch when placing new patch., Disp: 10 patch, Rfl: 0    levETIRAcetam (KEPPRA) 500 MG tablet, Take 1 tablet (500 mg) by mouth 2 times daily., Disp: 60 tablet, Rfl: 2    levothyroxine (SYNTHROID/LEVOTHROID) 112 MCG tablet, Take 112 mcg by mouth daily, Disp: , Rfl:     loratadine (CLARITIN) 10 MG tablet, Take 10 mg by mouth daily, Disp: , Rfl:     Multiple Vitamins-Minerals (MULTIVITAMIN ADULT EXTRA C PO), Take 1 tablet by mouth every 24 hours, " Disp: , Rfl:     naloxone (NARCAN) 4 MG/0.1ML nasal spray, Spray 1 spray (4 mg) into one nostril alternating nostrils as needed for opioid reversal every 2-3 minutes until assistance arrives, Disp: 0.2 mL, Rfl: 1    Nutritional Supplements (SALMON OIL) CAPS, Take 2 capsules by mouth daily , Disp: , Rfl:     omeprazole (PRILOSEC) 20 MG DR capsule, Take 20 mg by mouth daily, Disp: , Rfl:     ondansetron (ZOFRAN) 8 MG tablet, Take 1 tablet (8 mg) by mouth every 8 hours as needed for nausea (vomiting) (Patient not taking: Reported on 11/29/2024), Disp: 30 tablet, Rfl: 2    oxyCODONE (ROXICODONE) 5 MG tablet, Take 1-2 tablets (5-10 mg) by mouth every 3 hours as needed for severe pain., Disp: 90 tablet, Rfl: 0    prochlorperazine (COMPAZINE) 10 MG tablet, Take 1 tablet (10 mg) by mouth every 6 hours as needed for nausea or vomiting (Patient not taking: Reported on 11/29/2024), Disp: 30 tablet, Rfl: 5    tamsulosin (FLOMAX) 0.4 MG capsule, Take 1 capsule (0.4 mg) by mouth daily, Disp: 30 capsule, Rfl: 3    gabapentin (NEURONTIN) 300 MG capsule, Take 2 capsules (600 mg) by mouth every morning AND 3 capsules (900 mg) daily at 2 pm AND 3 capsules (900 mg) at bedtime., Disp: 240 capsule, Rfl: 3    ivermectin (STROMECTOL) 3 MG TABS tablet, , Disp: , Rfl:     predniSONE (DELTASONE) 5 MG tablet, Take 5 mg by mouth 2 times daily. Pt is taking 10 mg in the AM and 5 mg in the evening.  This is a taper dose and he will continue to taper down with starting radiation and being off chemotherapy at that is what prednisone was being used for originally., Disp: , Rfl:     Current Facility-Administered Medications:     heparin lock flush 100 unit/mL injection 5 mL, 5 mL, Intracatheter, Q8H, Omar Mcnair MD, 5 mL at 11/25/24 0937      Physical Exam:   /82   Pulse 68   Temp 98.4  F (36.9  C)   Resp 16   Wt 108.9 kg (240 lb)   SpO2 95%   BMI 34.93 kg/m    Wt Readings from Last 10 Encounters:   11/29/24 106.9 kg (235  lb 9.6 oz)   11/26/24 108.9 kg (240 lb)   11/25/24 108.9 kg (240 lb)   11/22/24 109.9 kg (242 lb 4.8 oz)   11/18/24 108.4 kg (239 lb)   10/28/24 109.5 kg (241 lb 8 oz)   10/07/24 109.2 kg (240 lb 11.2 oz)   09/23/24 108.9 kg (240 lb)   09/16/24 109.5 kg (241 lb 6.4 oz)   08/26/24 113.1 kg (249 lb 6.4 oz)   General: The patient is a pleasant male in no acute distress.  HEENT: EOMI. No swelling of the right upper eyelid. Pupils equal and round. Sclerae are anicteric. Mucous membranes moist.   Lymph: Neck is supple. No gross adenopathy.   Heart: No audible murmur.   Lungs: breathing comfortably on room air.   Abdomen:  non-distended.   MSK: Ambulates without assistance.      Neuro: Cranial nerves II through XII are grossly intact.  Loss of sensation present inferiorly and medially to R nose.    Skin: No rashes, petechiae, or bruising noted on exposed skin.  Psych: affect bright, alert and oriented.     LABORATORY DATA:  Most Recent 3 CBC's:  Recent Labs   Lab Test 11/25/24  0934 11/18/24  1020 10/28/24  1020   WBC 9.9 5.5 8.4   HGB 9.7* 9.0* 9.4*   * 109* 108*    127* 117*   ANEUTAUTO 8.9* 4.2 7.4     Most Recent 3 BMP's:  Recent Labs   Lab Test 11/25/24  0934 11/18/24  1020 10/28/24  1020    142 141   POTASSIUM 3.9 3.6 4.1   CHLORIDE 104 108* 107   CO2 23 24 23   BUN 22.6 16.2 17.8   CR 0.80 0.87 0.89   ANIONGAP 13 10 11   BETHANY 8.8 8.9 9.2   * 139* 136*   PROTTOTAL 6.5 6.4 6.4   ALBUMIN 3.9 3.7 4.0    Most Recent 3 LFT's:  Recent Labs   Lab Test 11/25/24  0934 11/18/24  1020 10/28/24  1020   AST 41 32 32   ALT 28 16 20   ALKPHOS 244* 165* 132   BILITOTAL 0.2 0.2 0.4     PSA trend, see oncology history.      ASSESSMENT & PLAN: Mr. Reyes is a 64 year old gentleman with metastatic castration sensitive prostate adenocarcinoma as well as an incidentally diagnosed stage III clear-cell renal cell carcinoma of the right kidney (s/p radical R nephrectomy 3/19/19).     Metastatic  "castration-resistant prostate cancer, with involvement of the bones and RPLN:   - Patient met the criteria for CHAARTED \"high-volume\" metastatic hormone-sensitive prostate cancer. He opted for docetaxel in combination with ADT (per JOSEFA, [id number], KARLA). He was subsequently treated with docetaxel x6 doses in the [facility] setting and enzalutamide.  He initiated Pluvicto in May 2023 with an initial slight decline in PSA, but this began to rise just prior to Cycle 3.  Given his XR evidence of progression in L femur and PSA rise, we re-evaluated his progression with PSMA PET scan in September 2023, with note of mixed response.  Canceled Pluvicto Dose for November due to symptomatic progression with PSA rise. PSA is labile. Biopsy results from 11/21 was unrevealing for evaluation of NEPC vs small cell given progression without a significant rise in PSA. Tempus peripheral blood mutation analysis is unrevealing for targetable mutations. Cabazitaxel started 11/27/23 given progression on Pluvicto. Carboplatin added 2/2024 and cabazitaxel dose reduced by 20% in April 2024 due to neuropathy.  Restaging with pulmonary consolidative findings without mass-like features, which had improved on October 2024 restaging imaging. If neuropathy remains intolerable, we could consider addition of Ra-223 and enzalutamide per PEACE-3 results, although this was for a first-line CRPC patient subset without prior ART therapy.  Notably, he has not received ART therapy in more than 3 years at the time of our October 2024. This does require continued administration of antiresorptive therapy.    -Lupron every 3 months, next due 12/30/24.    -Cabazitaxel to be continued at 20% dose reduction. Further reduced carboplatin to an AUC of 3 due to fatigue, anorexia, and worsening peripheral neuropathy in September 2024.   -He has tapered prednisone from 10 mg BID to 10 mg in the AM and 5 mg in the PM.   -CBC and CMP generally stable and notable for " ongoing mild anemia and thrombocytopenia.   -Increased pain and new sites of pain in right eye and clavicle, along with neurological changes in the face that are ongoing since stopping Ivermectin. HOLD chemotherapy today. MR brain with orbits and CT CAP ordered.   -Imaging demonstrating progression including enhancing bone lesions in the frontal and right parietal bones as well as pachymeningeal enhancement concerning for mets. Bone scan further demonstrates bone progression including in the right mandible, right inferior and superior lateral orbit.   - Wife and patient inquire if his pachymengineal disease could be related to his previously treated stage III renal cancer. Discussed in the setting of progressive prostate cancer elsewhere that I suspect this is most consistent with prostate pathology.   - Chromogranin A is further elevated on 11/20/24 labs to 500 from 271 on 7/15/24.   - Started dexamethasone 8 mg BID on 11/21/24 after patient called into triage with worsening facial numbness/pain. Symptoms are mildly improved 11/22/24. Given improvement, we will continue dexamethasone 8 mg BID with option to increase to TID if needed. However, patient is having insomnia already to hesitant to increase given improvement. Move evening dose earlier to try to avoid insomnia.   - Hold additional cycles of carboplatin/cabazitaxel as of 11/25 due to bony progression with pachymeningeal involvement.   - We have reached out again to radiation oncology for consideration of whole brain irradiation for pachymeningeal involvement.    - Walter and Micheline will think about how he would like to proceed.  Radium 223 and mitoxantrone are both reasonable options as is a palliative care-focused approach.  Unfortunately, clinical trials with immune engager therapies are not options with CNS involvement (JORGE ALBERTO-280) and Zometa use within the past year (LAVA-1207).      Facial numbness, eye pain.   Stopped Ivermectin 11/13 but symptoms  continue. Facial numbness between bottom lip and chin on right side, with pain/burning sensation. New pain behind right eye with improved swelling/drooping. Improving with dexamethasone. After review of MRI findings this is likely due to nerve involvement, although there is no clear culprit lesion on imaging.  Hopefully WBRT will be helpful with symptoms.  He is already on gabapentin and keppra.      Pressure in chest, resolved:  Has an ongoing sensation of pressure/need to cough in his chest, primarily against his sternum. Vital signs stable, negative pulmonary physical exam. No chest pain. No recurrence 11/22/24.     Bone metastases:    - Noted to have osseous metastatic disease at the time of diagnosis. S/p radiation to C spine and sacrum (Re-irradiation to sacrum).   - pain mgmt per palliative care, pain is now well controlled with fentanyl patch. Dexamethasone burst is helpful for pain flares and improved bone pain since starting dex 11/21/24.   - Continue 10mg prednisone daily with dexamethasone if he prefers.    - RT to L femur 10/2023.  RT for bilateral posterior ribs 12/2023 with Dr. Albert.  - Encouraged to continue calcium and vitamin D supplementation; continue Zometa 4mg IV every ~3 months.   - Last Zometa given 8/26/2024. Next due 11/2024. Scheduled for infusion next week at [facility].   - Increased pain in forehead, hips, back especially left posterior shoulder and new pain in right clavicle. MR brain and bone scan demonstrating progression in skull mets.    Stage 3, UISS-high risk ccRCC: s/p R nephrectomy   - Nephrectomy 3/19/19 and noted incidentally.  He is now 5+ years post-op without evidence for recurrence.   - At this point, there is a minimal risk of recurrence of his RCC given the time since surgery without the use of adjuvant immunotherapy. There is no suspicious adenopathy or other features on his most recent imaging studies.    - Will continue to monitor with imaging planned for prostate  cancer.    - not specifically discussed today (11/18/24).     Sensory neuropathy, Toes/feet Grade 2  - continue monitor for worsening with cabazitaxel.   - Carboplatin dose reduced to AUC of 3 in September of 2024.    - Continue acupuncture once weekly, has decreased to every 3 weeks but finds benefit and plans to increase back to weekly after travels.   - He is on gabapentin and duloxetine managed by palliative care   -duloxetine dose recently increased with further improvement in neuropathy. Tried weaning off gabapentin and pain in back and ribs was uncontrolled again. Continue duloxetine and gabapentin per palliative.      Anemia  - likely secondary to chemotherapy. Iron levels adequate on 4/22/24. MCV elevated.  Folate and B12 adequate.  Retic appropriately elevated.  Stable 11/18/24.    Left lower extremity swelling   - US 4/22/24 negative for DVT. CT abdomen pelvis 4/29 without significant or new lymphadenopathy. Improved lately with new compression garments. Continue compression stocking. Following with lymphedema therapy.    Overall plan:  - continue dex 8 mg BID  - Continue keppra 500 mg BID  - Think about how you would like to proceed with cares.  Radium 223 and mitoxantrone are both reasonable options.    - We will reach out to radiation oncology regarding scheduling for whole brain irradiation evaluation.    - Return as scheduled.    - Zometa and Lupron shots today at [facility] as scheduled.      The longitudinal plan of care for the diagnosis(es)/condition(s) as documented were addressed during this visit. Due to the added complexity in care, I will continue to support Walter in the subsequent management and with ongoing continuity of care.    A total of 60 minutes spent on the date of the encounter doing chart review, review of test results, interpretation of tests, patient visit, and documentation.  The patient was given the opportunity to ask multiple questions today, all of which were answered to their  satisfaction.     Omar Mcnair MD, PhD   of Medicine   Oncology/BMT/Cellular Therapies

## 2024-12-03 ENCOUNTER — MYC MEDICAL ADVICE (OUTPATIENT)
Dept: ONCOLOGY | Facility: CLINIC | Age: 64
End: 2024-12-03
Payer: COMMERCIAL

## 2024-12-05 DIAGNOSIS — C61 MALIGNANT NEOPLASM OF PROSTATE METASTATIC TO BONE (H): ICD-10-CM

## 2024-12-05 DIAGNOSIS — C79.51 MALIGNANT NEOPLASM OF PROSTATE METASTATIC TO BONE (H): ICD-10-CM

## 2024-12-05 DIAGNOSIS — C79.31 MALIGNANT NEOPLASM METASTATIC TO BRAIN (H): ICD-10-CM

## 2024-12-05 RX ORDER — DEXAMETHASONE 4 MG/1
8 TABLET ORAL 2 TIMES DAILY WITH MEALS
Qty: 30 TABLET | Refills: 1 | Status: SHIPPED | OUTPATIENT
Start: 2024-12-05

## 2024-12-09 ENCOUNTER — MYC MEDICAL ADVICE (OUTPATIENT)
Dept: ONCOLOGY | Facility: CLINIC | Age: 64
End: 2024-12-09
Payer: COMMERCIAL

## 2024-12-09 NOTE — TELEPHONE ENCOUNTER
"Oncology Nurse Triage - Reporting Symptoms    Situation:   Walter wondering if Keppra is require, as it is causing side effects of weakness, woozy, zombie feeling. Also notes increase fatigue and wonders if prednisone should be restarted? Triage RN call to pt to follow up.     Background:   Treating Provider:  Dr. Mcnair    Date of last office visit: 11/25/24    Recent treatments: Yes: 11/25/24 Zometa     Assessment  Keppra makes him feel weak, woozy, and \"zombie like\". He said symptoms started right away when he started the medication. He said he did take just 1 tablet last night, instead of the BID dosing.   Radiologist said they thought there was not much of a risk for seizure, but wanted to discuss further w/ Dr. Mcnair and he had not heard back.   Pt is hoping to get off of this medication if possible. He denies any recent seizure activity.   He said he notices a little change in recall with words. Denies any other LOC changes.     He tapered off prednisone- 12/8 was first day he did not take any prednisone. Saturday 12/7 he took 5mg in AM. He said fatigue today is a little better, wonders if because he did not take Keppra this morning. He admits he \"changed so many things at once, I shouldn't have done that\". He could not say if he felt more fatigue yesterday or today compared to when he took the 5mg on Saturday.   He wonders if he needs Prednisone for energy?   He also notes weakness with walking- has to crawl up stairs, as does not have strength in legs. He said when he was on the prednisone, he could kind of walk up the stairs, but got so used to crawling as it was easier.   Confirmed he is taking Dexamethasone 8mg BID for facial swelling- starts radiation on Wednesday.     Decreased Gabapentin back to 300mg, 600mg, 600mg. Pt said Dr. Zeng said he did not have a problem with dropping dose back down if he did not notice a difference. He said he was on heavier dose for just 4 days.     Sleeps at night pretty " good- said he does take Oxycodone for pain and it helps him sleep. He has to sleep upright or else face will start to burn.   Eating/drinking is hard with face numb, admits he should drink more.   Denies fever, cough, sore throat, SOB, chest pain.     Recommendations:   Suggested pt suck on ice or popsicle for fluid intake- pt said he has never thought of that and will try. Writer informed will send message to care team about medication questions/side effects and someone will contact him with recommendations. Pt voiced understanding.

## 2024-12-10 DIAGNOSIS — C61 MALIGNANT NEOPLASM OF PROSTATE METASTATIC TO BONE (H): Primary | ICD-10-CM

## 2024-12-10 DIAGNOSIS — C79.51 MALIGNANT NEOPLASM OF PROSTATE METASTATIC TO BONE (H): Primary | ICD-10-CM

## 2024-12-10 RX ORDER — PREDNISONE 5 MG/1
5 TABLET ORAL 2 TIMES DAILY
Qty: 60 TABLET | Refills: 5 | Status: SHIPPED | OUTPATIENT
Start: 2024-12-10

## 2024-12-11 ENCOUNTER — OFFICE VISIT (OUTPATIENT)
Dept: RADIATION ONCOLOGY | Facility: CLINIC | Age: 64
End: 2024-12-11
Attending: RADIOLOGY
Payer: COMMERCIAL

## 2024-12-11 VITALS
HEART RATE: 66 BPM | BODY MASS INDEX: 31.77 KG/M2 | OXYGEN SATURATION: 95 % | RESPIRATION RATE: 18 BRPM | SYSTOLIC BLOOD PRESSURE: 133 MMHG | WEIGHT: 227.8 LBS | DIASTOLIC BLOOD PRESSURE: 80 MMHG

## 2024-12-11 DIAGNOSIS — C79.51 MALIGNANT NEOPLASM OF PROSTATE METASTATIC TO BONE (H): Primary | ICD-10-CM

## 2024-12-11 DIAGNOSIS — C61 MALIGNANT NEOPLASM OF PROSTATE METASTATIC TO BONE (H): Primary | ICD-10-CM

## 2024-12-11 ASSESSMENT — PAIN SCALES - GENERAL: PAINLEVEL_OUTOF10: NO PAIN (0)

## 2024-12-11 NOTE — LETTER
12/11/2024      Walter Reyes  [address] [facility] [id number]      Dear Colleague,    Thank you for referring your patient, Walter Reyes, to the [facility] RADIATION ONCOLOGY. Please see a copy of my visit note below.    Radiation treatment completed without incident.  See on treatment visit the same day for further details.      Again, thank you for allowing me to participate in the care of your patient.        Sincerely,        Lexis Albert

## 2024-12-11 NOTE — PROGRESS NOTES
Radiation treatment completed without incident.  See on treatment visit the same day for further details.

## 2024-12-11 NOTE — LETTER
2024      Walter Reyes  [address] [facility] [address]      Dear Colleague,    Thank you for referring your patient, Walter Reyes, to the [facility] RADIATION ONCOLOGY. Please see a copy of my visit note below.    RADIATION ONCOLOGY WEEKLY ON TREATMENT VISIT   Encounter Date: Dec 11, 2024     Patient Name: Walter Reyes  MRN: [id number]  : 1960     Disease and Stage: metastatic prostate cancer  Treatment Site: Bilateral front and right orbital   Current Dose/Planned Total Dose: 300/3000 cGy  Current Fraction/Planned Total fractions: 1/10  Concurrent Chemotherapy: No  Drug and Frequency: N/A      ED visits/Hospitalizations:  N/A    Missed Treatments:  N/A    Subjective: Mr. Reyes presents to clinic today for his weekly on-treatment visit.  He reports decreased burning in the V2 and V3  distribution of cranial nerve V on the right.  He continues with tenderness in the forehead bilaterally.  He is on dexamethasone 8 mg twice daily.  For the last 9 months, he has noticed bilateral hand tremor, which has recently increased.  He thinks this may in fact be concurrent with the starting of dexamethasone.      Nursing ROS:   Nutrition Alteration  Diet Type: Patient's Preference  Nutrition Note: Appetite improved since Keppra discontinued  Skin  Skin Reaction: 0 - No changes  Skin Intervention: Educated on use of Aquaphor  CNS Alteration  CNS note: Right face/teeth numbness, constant; intermittent burning pain 2-3/10 right face/teeth; worsening bilat hand tremor              Psychosocial  Mood - Anxiety: 0 - Normal  Mood - Depression: 0 - Normal  Psychosocial Note: Fatigue improved since Keppra discontinued  Pain Assessment  0-10 Pain Scale: 0  Pain Treatment: Dexamethasone 8mg BID and Gabapentin TID (300mg, 600mg, 600mg).  Pain Note: Burning pain right face/teeth has improved, now intermittent.        Objective:   /80 (BP Location: Left  Pt is scheduled for Friday 9/8 @ 7 am.   Confirmed with Dr. Zoë Romeo and emily kelly. arm)   Pulse 66   Resp 18   Wt 103.3 kg (227 lb 12.8 oz)   SpO2 95%   BMI 31.77 kg/m     KPS 80  Pain Score No Pain (0)/10  General: Alert and in no acute distress.  HEENT: Normocephalic, atraumatic. No visible scleral icterus.  Neck: Apparent full range of motion.  CV: Appears well-perfused, with no visible cyanosis.  Lungs: Breathing easily on room air, with no difficulty completing full sentences  Extremities:  No visible edema of the upper extremities.   Neuro: Alert and oriented; grossly nonfocal. Normal speech. Moving upper and lower extremities equally.  Gait within normal limits.  Skin: No visible jaundice. No suspicious lesions of the visualized integuments.  Psych: Mood and affect are appropriate to given situation. Answers questions appropriately.        Treatment-related toxicities (CTCAE v5.0):  Fatigue: Grade 0: No toxicity  Pain: Grade 1: Mild pain  Dry mouth: Grade 0: No toxicity  Mucositis: Grade 0: No toxicity  Dermatitis: Grade 0: No toxicity    Assessment:    Mr. 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  It is noted that whole brain radiation was discussed due to extensive but nonspecific pachymeningeal enhancement on MRI.  However he wished to avoid whole brain radiation and instead focus on known areas of  osseous disease potentially responsible for his symptoms.  He started palliative radiation to the bilateral frontal frontal bones as well as the right base of skull inclusive of the neuroforamen for right cranial nerve V.    Plan:   Skull-based osseous metastasis.:  Continue radiotherapy    Pain management:  Continue present regimen skin care reviewed    Hand tremor:  This is an ongoing issue over the course of 9 months that I do not believe is easily explained by his recent brain MRI.  It may be due to essential tremor or other medications he has gotten in the management of his cancer.  However, given very recent worsening, I wonder if this is from dexamethasone.  We will taper dexamethasone next week, assuming that he continues to have good response with decreased burning in the cranial nerve V distribution.  If reduction of dexamethasone does not improve his symptoms, will discuss further with his medical oncology team.    Dermatitis:  Skin care reviewed    Mosaiq chart and setup information reviewed  IGRT images reviewed         Lexis Albert MD, MPH, PhD     Department of Radiation Oncology  [facility]         Again, thank you for allowing me to participate in the care of your patient.        Sincerely,        Lexis Albert

## 2024-12-11 NOTE — PROGRESS NOTES
RADIATION ONCOLOGY WEEKLY ON TREATMENT VISIT   Encounter Date: Dec 11, 2024     Patient Name: Walter Reyes  MRN: [id number]  : 1960     Disease and Stage: metastatic prostate cancer  Treatment Site: Bilateral front and right orbital   Current Dose/Planned Total Dose: 300/3000 cGy  Current Fraction/Planned Total fractions: 1/10  Concurrent Chemotherapy: No  Drug and Frequency: N/A      ED visits/Hospitalizations:  N/A    Missed Treatments:  N/A    Subjective: Mr. Reyes presents to clinic today for his weekly on-treatment visit.  He reports decreased burning in the V2 and V3  distribution of cranial nerve V on the right.  He continues with tenderness in the forehead bilaterally.  He is on dexamethasone 8 mg twice daily.  For the last 9 months, he has noticed bilateral hand tremor, which has recently increased.  He thinks this may in fact be concurrent with the starting of dexamethasone.      Nursing ROS:   Nutrition Alteration  Diet Type: Patient's Preference  Nutrition Note: Appetite improved since Keppra discontinued  Skin  Skin Reaction: 0 - No changes  Skin Intervention: Educated on use of Aquaphor  CNS Alteration  CNS note: Right face/teeth numbness, constant; intermittent burning pain 2-3/10 right face/teeth; worsening bilat hand tremor              Psychosocial  Mood - Anxiety: 0 - Normal  Mood - Depression: 0 - Normal  Psychosocial Note: Fatigue improved since Keppra discontinued  Pain Assessment  0-10 Pain Scale: 0  Pain Treatment: Dexamethasone 8mg BID and Gabapentin TID (300mg, 600mg, 600mg).  Pain Note: Burning pain right face/teeth has improved, now intermittent.        Objective:   /80 (BP Location: Left arm)   Pulse 66   Resp 18   Wt 103.3 kg (227 lb 12.8 oz)   SpO2 95%   BMI 31.77 kg/m     KPS 80  Pain Score No Pain (0)/10  General: Alert and in no acute distress.  HEENT: Normocephalic, atraumatic. No visible scleral icterus.  Neck: Apparent full range of  motion.  CV: Appears well-perfused, with no visible cyanosis.  Lungs: Breathing easily on room air, with no difficulty completing full sentences  Extremities:  No visible edema of the upper extremities.   Neuro: Alert and oriented; grossly nonfocal. Normal speech. Moving upper and lower extremities equally.  Gait within normal limits.  Skin: No visible jaundice. No suspicious lesions of the visualized integuments.  Psych: Mood and affect are appropriate to given situation. Answers questions appropriately.        Treatment-related toxicities (CTCAE v5.0):  Fatigue: Grade 0: No toxicity  Pain: Grade 1: Mild pain  Dry mouth: Grade 0: No toxicity  Mucositis: Grade 0: No toxicity  Dermatitis: Grade 0: No toxicity    Assessment:    Mr. 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  It is noted that whole brain radiation was discussed due to extensive but nonspecific pachymeningeal enhancement on MRI.  However he wished to avoid whole brain radiation and instead focus on known areas of osseous disease potentially responsible for his symptoms.  He started palliative radiation to the bilateral frontal frontal bones as well as the right base of skull inclusive of the neuroforamen for right cranial nerve V.    Plan:   Skull-based osseous  metastasis.:  Continue radiotherapy    Pain management:  Continue present regimen skin care reviewed    Hand tremor:  This is an ongoing issue over the course of 9 months that I do not believe is easily explained by his recent brain MRI.  It may be due to essential tremor or other medications he has gotten in the management of his cancer.  However, given very recent worsening, I wonder if this is from dexamethasone.  We will taper dexamethasone next week, assuming that he continues to have good response with decreased burning in the cranial nerve V distribution.  If reduction of dexamethasone does not improve his symptoms, will discuss further with his medical oncology team.    Dermatitis:  Skin care reviewed    Mosaiq chart and setup information reviewed  IGRT images reviewed         Lexis Albert MD, MPH, PhD     Department of Radiation Oncology  [facility]

## 2024-12-12 ENCOUNTER — APPOINTMENT (OUTPATIENT)
Dept: RADIATION ONCOLOGY | Facility: CLINIC | Age: 64
End: 2024-12-12
Attending: RADIOLOGY
Payer: COMMERCIAL

## 2024-12-12 PROCEDURE — 77014 PR CT GUIDE FOR PLACEMENT RADIATION THERAPY FIELDS: CPT | Mod: 26 | Performed by: STUDENT IN AN ORGANIZED HEALTH CARE EDUCATION/TRAINING PROGRAM

## 2024-12-12 PROCEDURE — 77386 HC IMRT TREATMENT DELIVERY, COMPLEX: CPT | Performed by: RADIOLOGY

## 2024-12-13 ENCOUNTER — APPOINTMENT (OUTPATIENT)
Dept: RADIATION ONCOLOGY | Facility: CLINIC | Age: 64
End: 2024-12-13
Attending: RADIOLOGY
Payer: COMMERCIAL

## 2024-12-13 PROCEDURE — 77014 PR CT GUIDE FOR PLACEMENT RADIATION THERAPY FIELDS: CPT | Mod: 26 | Performed by: RADIOLOGY

## 2024-12-13 PROCEDURE — 77386 HC IMRT TREATMENT DELIVERY, COMPLEX: CPT | Performed by: RADIOLOGY

## 2024-12-16 ENCOUNTER — APPOINTMENT (OUTPATIENT)
Dept: RADIATION ONCOLOGY | Facility: CLINIC | Age: 64
End: 2024-12-16
Attending: RADIOLOGY
Payer: COMMERCIAL

## 2024-12-16 DIAGNOSIS — C61 PROSTATE CANCER (H): ICD-10-CM

## 2024-12-16 DIAGNOSIS — G89.3 CANCER ASSOCIATED PAIN: ICD-10-CM

## 2024-12-16 PROCEDURE — 77386 HC IMRT TREATMENT DELIVERY, COMPLEX: CPT | Performed by: RADIOLOGY

## 2024-12-16 PROCEDURE — 77014 PR CT GUIDE FOR PLACEMENT RADIATION THERAPY FIELDS: CPT | Mod: 26 | Performed by: RADIOLOGY

## 2024-12-16 RX ORDER — OXYCODONE HYDROCHLORIDE 5 MG/1
5-10 TABLET ORAL
Qty: 90 TABLET | Refills: 0 | Status: SHIPPED | OUTPATIENT
Start: 2024-12-16

## 2024-12-16 RX ORDER — FENTANYL 25 UG/1
1 PATCH TRANSDERMAL
Qty: 10 PATCH | Refills: 0 | Status: SHIPPED | OUTPATIENT
Start: 2024-12-16

## 2024-12-16 NOTE — TELEPHONE ENCOUNTER
Received Dead Inventory Management Systemhart message from patient requesting refill of  Fentanyl and oxycodone .     Last refill of fentanyl: 11/12/24  Last refill of oxycodone: 10/4/24  Last office visit: 11/26/24  Scheduled for follow up 1/2/25     Will route request to NP for review.     Reviewed [place]  Report.

## 2024-12-17 ENCOUNTER — APPOINTMENT (OUTPATIENT)
Dept: RADIATION ONCOLOGY | Facility: CLINIC | Age: 64
End: 2024-12-17
Attending: RADIOLOGY
Payer: COMMERCIAL

## 2024-12-17 PROCEDURE — 77336 RADIATION PHYSICS CONSULT: CPT | Performed by: RADIOLOGY

## 2024-12-17 PROCEDURE — 77427 RADIATION TX MANAGEMENT X5: CPT | Performed by: RADIOLOGY

## 2024-12-17 PROCEDURE — 77014 PR CT GUIDE FOR PLACEMENT RADIATION THERAPY FIELDS: CPT | Mod: 26 | Performed by: RADIOLOGY

## 2024-12-17 PROCEDURE — 77386 HC IMRT TREATMENT DELIVERY, COMPLEX: CPT | Performed by: RADIOLOGY

## 2024-12-18 ENCOUNTER — OFFICE VISIT (OUTPATIENT)
Dept: RADIATION ONCOLOGY | Facility: CLINIC | Age: 64
End: 2024-12-18
Attending: RADIOLOGY
Payer: COMMERCIAL

## 2024-12-18 VITALS
SYSTOLIC BLOOD PRESSURE: 109 MMHG | OXYGEN SATURATION: 99 % | WEIGHT: 228 LBS | HEART RATE: 99 BPM | BODY MASS INDEX: 31.8 KG/M2 | DIASTOLIC BLOOD PRESSURE: 75 MMHG

## 2024-12-18 DIAGNOSIS — C61 MALIGNANT NEOPLASM OF PROSTATE METASTATIC TO BONE (H): ICD-10-CM

## 2024-12-18 DIAGNOSIS — C79.31 MALIGNANT NEOPLASM METASTATIC TO BRAIN (H): ICD-10-CM

## 2024-12-18 DIAGNOSIS — C79.51 MALIGNANT NEOPLASM OF PROSTATE METASTATIC TO BONE (H): ICD-10-CM

## 2024-12-18 PROCEDURE — 77386 HC IMRT TREATMENT DELIVERY, COMPLEX: CPT | Performed by: RADIOLOGY

## 2024-12-18 RX ORDER — DEXAMETHASONE 4 MG/1
8 TABLET ORAL 2 TIMES DAILY WITH MEALS
Qty: 30 TABLET | Refills: 1 | Status: SHIPPED | OUTPATIENT
Start: 2024-12-18

## 2024-12-18 NOTE — PROGRESS NOTES
RADIATION ONCOLOGY WEEKLY ON TREATMENT VISIT   Encounter Date: Dec 18, 2024     Patient Name: Walter Reyes  MRN: [id number]  : 1960     Disease and Stage: metastatic prostate cancer  Treatment Site: Bilateral front and right orbital   Current Dose/Planned Total Dose: 1800/3000 cGy  Current Fraction/Planned Total fractions: 6/10  Concurrent Chemotherapy: No  Drug and Frequency: N/A      ED visits/Hospitalizations:  N/A    Missed Treatments:  N/A    Subjective: Mr. Reyes presents to clinic today for his weekly on-treatment visit.  He reports no substantial decrease in burning in the V2 and V3  distribution of cranial nerve V on the right with increased sensation in this region.  He reports reduction in tenderness in the forehead bilaterally.  He is on dexamethasone 8 mg twice daily.  Hand tremor persists.  We will try dexamethasone taper to see if this helps address the concern.      Nursing ROS:   Nutrition Alteration  Diet Type: Patient's Preference  Nutrition Note: Appetite improved since Keppra discontinued  Skin  Skin Reaction: 0 - No changes  Skin Intervention: Educated on use of Aquaphor  CNS Alteration  CNS note: Right face/teeth numbness, constant; intermittent burning pain 2-3/10 right face/teeth; worsening bilat hand tremor              Psychosocial  Mood - Anxiety: 0 - Normal  Mood - Depression: 0 - Normal  Psychosocial Note: Fatigue improved since Keppra discontinued  Pain Assessment  0-10 Pain Scale: 0  Pain Treatment: Dexamethasone 8mg BID and Gabapentin TID (300mg, 600mg, 600mg).  Pain Note: Burning pain right face/teeth has improved, now intermittent.        Objective:   /75   Pulse 99   Wt 103.4 kg (228 lb)   SpO2 99%   BMI 31.80 kg/m     KPS 80  Pain Score No Pain (0)/10  General: Alert and in no acute distress.  HEENT: Normocephalic, atraumatic. No visible scleral icterus.  Neck: Apparent full range of motion.  CV: Appears well-perfused, with no visible  cyanosis.  Lungs: Breathing easily on room air, with no difficulty completing full sentences  Extremities:  No visible edema of the upper extremities.   Neuro: Alert and oriented; grossly nonfocal. Normal speech. Moving upper and lower extremities equally.  Gait within normal limits.  Apparent active and inactive tremor of the hands.  Skin: No visible jaundice. No suspicious lesions of the visualized integuments.  Psych: Mood and affect are appropriate to given situation. Answers questions appropriately.        Treatment-related toxicities (CTCAE v5.0):  Fatigue: Grade 0: No toxicity  Pain: Grade 1: Mild pain  Dry mouth: Grade 0: No toxicity  Mucositis: Grade 0: No toxicity  Dermatitis: Grade 0: No toxicity    Assessment:    Mr. 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  It is noted that whole brain radiation was discussed due to extensive but nonspecific pachymeningeal enhancement on MRI.  However he wished to avoid whole brain radiation and instead focus on known areas of osseous disease potentially responsible for his symptoms.  He started palliative radiation to the bilateral frontal frontal bones as well as the right base of skull inclusive of the neuroforamen for right cranial nerve V.  After a few fractions, he noted improvement to  burning pain within the cranial nerve V distribution as well as increased sensation in this area.  His tenderness in the frontal bone region has also improved.  We will pursue dexamethasone taper to address whether this is contributing to his hand tremor.    Plan:   Skull-based osseous metastasis.:  Continue radiotherapy    Pain management:  Continue present regimen skin care reviewed    Hand tremor:  This is an ongoing issue over the course of 9 months that I do not believe is easily explained by his recent brain MRI.  It may be due to essential tremor or other medications he has gotten in the management of his cancer.  However, given very recent worsening, I wonder if this is from dexamethasone.  We will taper dexamethasone next week, assuming that he continues to have good response with decreased burning in the cranial nerve V distribution.  If reduction of dexamethasone does not improve his symptoms, will discuss further with his medical oncology team.  Dexamethasone taper over 3 weeks was provided.  Dexamethasone prescription sent to requested pharmacy.    Dermatitis:  Skin care reviewed    Mosaiq chart and setup information reviewed  IGRT images reviewed         Lexis Albert MD, MPH, PhD     Department of Radiation Oncology  [facility]

## 2024-12-18 NOTE — LETTER
2024      Walter Reyes  [address] [address] [facility] [address]      Dear Colleague,    Thank you for referring your patient, Walter Reyes, to the formerly [facility] RADIATION ONCOLOGY. Please see a copy of my visit note below.    RADIATION ONCOLOGY WEEKLY ON TREATMENT VISIT   Encounter Date: Dec 18, 2024     Patient Name: Walter Reyes  MRN: [id number]  : 1960     Disease and Stage: metastatic prostate cancer  Treatment Site: Bilateral front and right orbital   Current Dose/Planned Total Dose: 1800/3000 cGy  Current Fraction/Planned Total fractions: 610  Concurrent Chemotherapy: No  Drug and Frequency: N/A      ED visits/Hospitalizations:  N/A    Missed Treatments:  N/A    Subjective: Mr. Reyes presents to clinic today for his weekly on-treatment visit.  He reports no substantial decrease in burning in the V2 and V3  distribution of cranial nerve V on the right with increased sensation in this region.  He reports reduction in tenderness in the forehead bilaterally.  He is on dexamethasone 8 mg twice daily.  Hand tremor persists.  We will try dexamethasone taper to see if this helps address the concern.      Nursing ROS:   Nutrition Alteration  Diet Type: Patient's Preference  Nutrition Note: Appetite improved since Keppra discontinued  Skin  Skin Reaction: 0 - No changes  Skin Intervention: Educated on use of Aquaphor  CNS Alteration  CNS note: Right face/teeth numbness, constant; intermittent burning pain 2-3/10 right face/teeth; worsening bilat hand tremor              Psychosocial  Mood - Anxiety: 0 - Normal  Mood - Depression: 0 - Normal  Psychosocial Note: Fatigue improved since Keppra discontinued  Pain Assessment  0-10 Pain Scale: 0  Pain Treatment: Dexamethasone 8mg BID and Gabapentin TID (300mg, 600mg, 600mg).  Pain Note: Burning pain right face/teeth has improved, now intermittent.        Objective:   /75   Pulse 99   Wt 103.4 kg  (228 lb)   SpO2 99%   BMI 31.80 kg/m     KPS 80  Pain Score No Pain (0)/10  General: Alert and in no acute distress.  HEENT: Normocephalic, atraumatic. No visible scleral icterus.  Neck: Apparent full range of motion.  CV: Appears well-perfused, with no visible cyanosis.  Lungs: Breathing easily on room air, with no difficulty completing full sentences  Extremities:  No visible edema of the upper extremities.   Neuro: Alert and oriented; grossly nonfocal. Normal speech. Moving upper and lower extremities equally.  Gait within normal limits.  Apparent active and inactive tremor of the hands.  Skin: No visible jaundice. No suspicious lesions of the visualized integuments.  Psych: Mood and affect are appropriate to given situation. Answers questions appropriately.        Treatment-related toxicities (CTCAE v5.0):  Fatigue: Grade 0: No toxicity  Pain: Grade 1: Mild pain  Dry mouth: Grade 0: No toxicity  Mucositis: Grade 0: No toxicity  Dermatitis: Grade 0: No toxicity    Assessment:    Mr. 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  It is noted that whole brain radiation was discussed due to extensive but nonspecific pachymeningeal enhancement on MRI.  However he wished to avoid whole brain radiation and instead focus on known areas of  osseous disease potentially responsible for his symptoms.  He started palliative radiation to the bilateral frontal frontal bones as well as the right base of skull inclusive of the neuroforamen for right cranial nerve V.  After a few fractions, he noted improvement to burning pain within the cranial nerve V distribution as well as increased sensation in this area.  His tenderness in the frontal bone region has also improved.  We will pursue dexamethasone taper to address whether this is contributing to his hand tremor.    Plan:   Skull-based osseous metastasis.:  Continue radiotherapy    Pain management:  Continue present regimen skin care reviewed    Hand tremor:  This is an ongoing issue over the course of 9 months that I do not believe is easily explained by his recent brain MRI.  It may be due to essential tremor or other medications he has gotten in the management of his cancer.  However, given very recent worsening, I wonder if this is from dexamethasone.  We will taper dexamethasone next week, assuming that he continues to have good response with decreased burning in the cranial nerve V distribution.  If reduction of dexamethasone does not improve his symptoms, will discuss further with his medical oncology team.  Dexamethasone taper over 3 weeks was provided.  Dexamethasone prescription sent to requested pharmacy.    Dermatitis:  Skin care reviewed    Mosaiq chart and setup information reviewed  IGRT images reviewed         Lexis Albert MD, MPH, PhD     Department of Radiation Oncology  [facility]         Again, thank you for allowing me to participate in the care of your patient.        Sincerely,        Lexis Albert

## 2024-12-19 ENCOUNTER — APPOINTMENT (OUTPATIENT)
Dept: RADIATION ONCOLOGY | Facility: CLINIC | Age: 64
End: 2024-12-19
Attending: RADIOLOGY
Payer: COMMERCIAL

## 2024-12-19 PROCEDURE — 99207 PR NO BILLABLE SERVICE THIS VISIT: CPT | Performed by: RADIOLOGY

## 2024-12-19 PROCEDURE — 77386 HC IMRT TREATMENT DELIVERY, COMPLEX: CPT | Performed by: RADIOLOGY

## 2024-12-20 ENCOUNTER — APPOINTMENT (OUTPATIENT)
Dept: RADIATION ONCOLOGY | Facility: CLINIC | Age: 64
End: 2024-12-20
Attending: RADIOLOGY
Payer: COMMERCIAL

## 2024-12-20 PROCEDURE — 77014 PR CT GUIDE FOR PLACEMENT RADIATION THERAPY FIELDS: CPT | Mod: 26 | Performed by: STUDENT IN AN ORGANIZED HEALTH CARE EDUCATION/TRAINING PROGRAM

## 2024-12-20 PROCEDURE — 77386 HC IMRT TREATMENT DELIVERY, COMPLEX: CPT | Performed by: RADIOLOGY

## 2024-12-23 ENCOUNTER — APPOINTMENT (OUTPATIENT)
Dept: RADIATION ONCOLOGY | Facility: CLINIC | Age: 64
End: 2024-12-23
Attending: RADIOLOGY
Payer: COMMERCIAL

## 2024-12-23 PROCEDURE — 77014 PR CT GUIDE FOR PLACEMENT RADIATION THERAPY FIELDS: CPT | Mod: 26 | Performed by: RADIOLOGY

## 2024-12-23 PROCEDURE — 77386 HC IMRT TREATMENT DELIVERY, COMPLEX: CPT | Performed by: RADIOLOGY

## 2024-12-24 ENCOUNTER — OFFICE VISIT (OUTPATIENT)
Dept: RADIATION ONCOLOGY | Facility: CLINIC | Age: 64
End: 2024-12-24
Attending: RADIOLOGY
Payer: COMMERCIAL

## 2024-12-24 VITALS
RESPIRATION RATE: 16 BRPM | OXYGEN SATURATION: 94 % | HEART RATE: 43 BPM | DIASTOLIC BLOOD PRESSURE: 74 MMHG | SYSTOLIC BLOOD PRESSURE: 113 MMHG

## 2024-12-24 DIAGNOSIS — C61 MALIGNANT NEOPLASM OF PROSTATE METASTATIC TO BONE (H): Primary | ICD-10-CM

## 2024-12-24 DIAGNOSIS — C79.51 MALIGNANT NEOPLASM OF PROSTATE METASTATIC TO BONE (H): Primary | ICD-10-CM

## 2024-12-24 PROCEDURE — 77386 HC IMRT TREATMENT DELIVERY, COMPLEX: CPT | Performed by: RADIOLOGY

## 2024-12-24 PROCEDURE — 77336 RADIATION PHYSICS CONSULT: CPT | Performed by: RADIOLOGY

## 2024-12-24 PROCEDURE — 77427 RADIATION TX MANAGEMENT X5: CPT | Performed by: RADIOLOGY

## 2024-12-24 ASSESSMENT — PAIN SCALES - GENERAL: PAINLEVEL_OUTOF10: MILD PAIN (2)

## 2024-12-24 NOTE — PROGRESS NOTES
RADIATION ONCOLOGY WEEKLY ON TREATMENT VISIT   Encounter Date: Dec 24, 2024     Patient Name: Walter Reyes  MRN: [id number]  : 1960     Disease and Stage: metastatic prostate cancer  Treatment Site: Bilateral front and right orbital   Current Dose/Planned Total Dose: 3000/3000 cGy  Current Fraction/Planned Total fractions: 10/10  Concurrent Chemotherapy: No  Drug and Frequency: N/A      ED visits/Hospitalizations:  N/A    Missed Treatments:  N/A    Subjective: Mr. Reyes presents to clinic today for his weekly on-treatment visit.  He near resolution of burning V2 and V3  distribution of cranial nerve V on the right.  While much of the area in this distribution remains with reduced sensation, he does notice increase sensation specifically within the right nostril as well as tingling sensation elsewhere in the previous completely numb distribution.  He reports resolution of tenderness in the forehead bilaterally.  He is doing well on the dexamethasone taper.  He thinks that there has been some mild reduction in hand tremors since going down on steroids.      Nursing ROS:   Nutrition Alteration  Diet Type: Patient's Preference  Nutrition Note: Appetite improved since Keppra discontinued  Skin  Skin Reaction: 0 - No changes  Skin Intervention: Educated on use of Aquaphor  CNS Alteration  CNS note: Right face/teeth numbness, constant; intermittent burning pain 2-3/10 right face/teeth; worsening bilat hand tremor              Psychosocial  Mood - Anxiety: 0 - Normal  Mood - Depression: 0 - Normal  Psychosocial Note: Fatigue improved since Keppra discontinued  Pain Assessment  0-10 Pain Scale: 0  Pain Treatment: Dexamethasone 8mg BID and Gabapentin TID (300mg, 600mg, 600mg).  Pain Note: Burning pain right face/teeth has improved, now intermittent.        Objective:   /74 (BP Location: Left arm)   Pulse (!) 43   Resp 16   SpO2 94%    KPS 80  Pain Score Mild Pain (2)/10  General: Alert and  in no acute distress.  HEENT: Normocephalic, atraumatic. No visible scleral icterus.  Neck: Apparent full range of motion.  CV: Appears well-perfused, with no visible cyanosis.  Lungs: Breathing easily on room air, with no difficulty completing full sentences  Extremities:  No visible edema of the upper extremities.   Neuro: Alert and oriented; grossly nonfocal. Normal speech. Moving upper and lower extremities equally.  Gait within normal limits.  Apparent active and inactive tremor of the hands.  Skin: No visible jaundice. No suspicious lesions of the visualized integuments.  Psych: Mood and affect are appropriate to given situation. Answers questions appropriately.        Treatment-related toxicities (CTCAE v5.0):  Fatigue: Grade 0: No toxicity  Pain: Grade 1: Mild pain  Dry mouth: Grade 0: No toxicity  Mucositis: Grade 0: No toxicity  Dermatitis: Grade 0: No toxicity    Assessment:    Mr. 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  It is noted that whole brain radiation was discussed due to extensive but nonspecific pachymeningeal enhancement on MRI.  However he wished to avoid whole brain radiation and instead focus on known areas of osseous disease potentially responsible for his symptoms.  He started palliative radiation to the  bilateral frontal frontal bones as well as the right base of skull inclusive of the neuroforamen for right cranial nerve V.  At the end of the course of palliative radiation, he reports resolution or near resolution of a burning sensation in the cranial nerve V2 and V3 distribution.  He reports continued decrease sensation within this distribution, albeit with new improvement of sensation within the right nostril and occasional tingling within the distribution that is also new.  Tenderness along the forehead has resolved entirely.  Overall, he is quite pleased with his response today.      Plan:   Skull-based osseous metastasis.:  Follow-up in 6 weeks to assess durability and final extent of response    Pain management:  Continue present regimen       Hand tremor:  This is an ongoing issue over the course of 9 months that I do not believe is easily explained by his recent brain MRI.  It may be due to essential tremor or other medications he has gotten in the management of his cancer.  However, given very recent worsening, I wonder if this is from dexamethasone.  We have thus been tapering dexamethasone, with some improvement to the tremor.  As such, he will continue this taper.  I asked him to contact me if approximately 2 weeks after he has stopped dexamethasone, the tremor persists.  At that time, I will talk to his medical oncology and other providers and ascertain if additional referral such as to neurology is warranted.     Dermatitis:  Skin care reviewed    Mosaiq chart and setup information reviewed  IGRT images reviewed         Lexis Albert MD, MPH, PhD     Department of Radiation Oncology  [facility]

## 2024-12-24 NOTE — LETTER
2024      Walter Reyes  [address] [facility] [address]      Dear Colleague,    Thank you for referring your patient, Walter Reyes, to the [facility] RADIATION ONCOLOGY. Please see a copy of my visit note below.    RADIATION ONCOLOGY WEEKLY ON TREATMENT VISIT   Encounter Date: Dec 24, 2024     Patient Name: Walter Reyes  MRN: [id number]  : 1960     Disease and Stage: metastatic prostate cancer  Treatment Site: Bilateral front and right orbital   Current Dose/Planned Total Dose: 3000/3000 cGy  Current Fraction/Planned Total fractions: 10/10  Concurrent Chemotherapy: No  Drug and Frequency: N/A      ED visits/Hospitalizations:  N/A    Missed Treatments:  N/A    Subjective: Mr. Reyes presents to clinic today for his weekly on-treatment visit.  He near resolution of burning V2 and V3  distribution of cranial nerve V on the right.  While much of the area in this distribution remains with reduced sensation, he does notice increase sensation specifically within the right nostril as well as tingling sensation elsewhere in the previous completely numb distribution.  He reports resolution of tenderness in the forehead bilaterally.  He is doing well on the dexamethasone taper.  He thinks that there has been some mild reduction in hand tremors since going down on steroids.      Nursing ROS:   Nutrition Alteration  Diet Type: Patient's Preference  Nutrition Note: Appetite improved since Keppra discontinued  Skin  Skin Reaction: 0 - No changes  Skin Intervention: Educated on use of Aquaphor  CNS Alteration  CNS note: Right face/teeth numbness, constant; intermittent burning pain 2-3/10 right face/teeth; worsening bilat hand tremor              Psychosocial  Mood - Anxiety: 0 - Normal  Mood - Depression: 0 - Normal  Psychosocial Note: Fatigue improved since Keppra discontinued  Pain Assessment  0-10 Pain Scale: 0  Pain Treatment: Dexamethasone 8mg BID  and Gabapentin TID (300mg, 600mg, 600mg).  Pain Note: Burning pain right face/teeth has improved, now intermittent.        Objective:   /74 (BP Location: Left arm)   Pulse (!) 43   Resp 16   SpO2 94%    KPS 80  Pain Score Mild Pain (2)/10  General: Alert and in no acute distress.  HEENT: Normocephalic, atraumatic. No visible scleral icterus.  Neck: Apparent full range of motion.  CV: Appears well-perfused, with no visible cyanosis.  Lungs: Breathing easily on room air, with no difficulty completing full sentences  Extremities:  No visible edema of the upper extremities.   Neuro: Alert and oriented; grossly nonfocal. Normal speech. Moving upper and lower extremities equally.  Gait within normal limits.  Apparent active and inactive tremor of the hands.  Skin: No visible jaundice. No suspicious lesions of the visualized integuments.  Psych: Mood and affect are appropriate to given situation. Answers questions appropriately.        Treatment-related toxicities (CTCAE v5.0):  Fatigue: Grade 0: No toxicity  Pain: Grade 1: Mild pain  Dry mouth: Grade 0: No toxicity  Mucositis: Grade 0: No toxicity  Dermatitis: Grade 0: No toxicity    Assessment:    Mr. 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  It is noted that whole brain radiation  was discussed due to extensive but nonspecific pachymeningeal enhancement on MRI.  However he wished to avoid whole brain radiation and instead focus on known areas of osseous disease potentially responsible for his symptoms.  He started palliative radiation to the bilateral frontal frontal bones as well as the right base of skull inclusive of the neuroforamen for right cranial nerve V.  At the end of the course of palliative radiation, he reports resolution or near resolution of a burning sensation in the cranial nerve V2 and V3 distribution.  He reports continued decrease sensation within this distribution, albeit with new improvement of sensation within the right nostril and occasional tingling within the distribution that is also new.  Tenderness along the forehead has resolved entirely.  Overall, he is quite pleased with his response today.      Plan:   Skull-based osseous metastasis.:  Follow-up in 6 weeks to assess durability and final extent of response    Pain management:  Continue present regimen       Hand tremor:  This is an ongoing issue over the course of 9 months that I do not believe is easily explained by his recent brain MRI.  It may be due to essential tremor or other medications he has gotten in the management of his cancer.  However, given very recent worsening, I wonder if this is from dexamethasone.  We have thus been tapering dexamethasone, with some improvement to the tremor.  As such, he will continue this taper.  I asked him to contact me if approximately 2 weeks after he has stopped dexamethasone, the tremor persists.  At that time, I will talk to his medical oncology and other providers and ascertain if additional referral such as to neurology is warranted.     Dermatitis:  Skin care reviewed    Mosaiq chart and setup information reviewed  IGRT images reviewed         Lexis Albert MD, MPH, PhD     Department of Radiation Oncology  [facility]  [place]         Again, thank you for allowing me to participate in the care of your patient.        Sincerely,        Lexis Albert    Electronically signed

## 2025-01-01 ENCOUNTER — HOSPITAL ENCOUNTER (EMERGENCY)
Facility: CLINIC | Age: 65
Discharge: ANOTHER HOSPITAL | DRG: 853 | End: 2025-01-04
Attending: EMERGENCY MEDICINE | Admitting: EMERGENCY MEDICINE
Payer: COMMERCIAL

## 2025-01-01 ENCOUNTER — APPOINTMENT (OUTPATIENT)
Dept: GENERAL RADIOLOGY | Facility: CLINIC | Age: 65
DRG: 853 | End: 2025-01-01
Attending: INTERNAL MEDICINE
Payer: COMMERCIAL

## 2025-01-01 ENCOUNTER — APPOINTMENT (OUTPATIENT)
Dept: GENERAL RADIOLOGY | Facility: CLINIC | Age: 65
DRG: 853 | End: 2025-01-01
Payer: COMMERCIAL

## 2025-01-01 ENCOUNTER — HOSPITAL ENCOUNTER (INPATIENT)
Facility: CLINIC | Age: 65
LOS: 1 days | DRG: 853 | End: 2025-01-05
Attending: EMERGENCY MEDICINE | Admitting: INTERNAL MEDICINE
Payer: COMMERCIAL

## 2025-01-01 ENCOUNTER — APPOINTMENT (OUTPATIENT)
Dept: CARDIOLOGY | Facility: CLINIC | Age: 65
DRG: 853 | End: 2025-01-01
Attending: INTERNAL MEDICINE
Payer: COMMERCIAL

## 2025-01-01 ENCOUNTER — APPOINTMENT (OUTPATIENT)
Dept: INTERVENTIONAL RADIOLOGY/VASCULAR | Facility: CLINIC | Age: 65
DRG: 853 | End: 2025-01-01
Attending: RADIOLOGY
Payer: COMMERCIAL

## 2025-01-01 ENCOUNTER — LAB (OUTPATIENT)
Dept: LAB | Facility: CLINIC | Age: 65
End: 2025-01-01
Payer: COMMERCIAL

## 2025-01-01 ENCOUNTER — APPOINTMENT (OUTPATIENT)
Dept: CT IMAGING | Facility: CLINIC | Age: 65
DRG: 853 | End: 2025-01-01
Attending: EMERGENCY MEDICINE
Payer: COMMERCIAL

## 2025-01-01 VITALS
DIASTOLIC BLOOD PRESSURE: 71 MMHG | OXYGEN SATURATION: 92 % | RESPIRATION RATE: 27 BRPM | TEMPERATURE: 98.4 F | HEART RATE: 93 BPM | SYSTOLIC BLOOD PRESSURE: 119 MMHG

## 2025-01-01 VITALS
RESPIRATION RATE: 21 BRPM | TEMPERATURE: 100 F | OXYGEN SATURATION: 93 % | DIASTOLIC BLOOD PRESSURE: 76 MMHG | HEART RATE: 105 BPM | BODY MASS INDEX: 33.05 KG/M2 | SYSTOLIC BLOOD PRESSURE: 122 MMHG | WEIGHT: 237 LBS

## 2025-01-01 DIAGNOSIS — J96.01 ACUTE RESPIRATORY FAILURE WITH HYPOXIA (H): ICD-10-CM

## 2025-01-01 DIAGNOSIS — D69.49: ICD-10-CM

## 2025-01-01 DIAGNOSIS — J18.9 PNEUMONIA OF BOTH UPPER LOBES DUE TO INFECTIOUS ORGANISM: ICD-10-CM

## 2025-01-01 DIAGNOSIS — A41.9 SEVERE SEPSIS (H): ICD-10-CM

## 2025-01-01 DIAGNOSIS — R65.20 SEVERE SEPSIS (H): ICD-10-CM

## 2025-01-01 DIAGNOSIS — D69.6 THROMBOCYTOPENIA: ICD-10-CM

## 2025-01-01 DIAGNOSIS — I26.02 ACUTE SADDLE PULMONARY EMBOLISM WITH ACUTE COR PULMONALE (H): ICD-10-CM

## 2025-01-01 LAB
ABO + RH BLD: NORMAL
ALBUMIN SERPL BCG-MCNC: 2.8 G/DL (ref 3.5–5.2)
ALBUMIN SERPL BCG-MCNC: 3.1 G/DL (ref 3.5–5.2)
ALBUMIN UR-MCNC: 50 MG/DL
ALLEN'S TEST: ABNORMAL
ALLEN'S TEST: YES
ALLEN'S TEST: YES
ALP SERPL-CCNC: 226 U/L (ref 40–150)
ALP SERPL-CCNC: 261 U/L (ref 40–150)
ALT SERPL W P-5'-P-CCNC: 43 U/L (ref 0–70)
ALT SERPL W P-5'-P-CCNC: 53 U/L (ref 0–70)
ANION GAP SERPL CALCULATED.3IONS-SCNC: 13 MMOL/L (ref 7–15)
ANION GAP SERPL CALCULATED.3IONS-SCNC: 16 MMOL/L (ref 7–15)
ANION GAP SERPL CALCULATED.3IONS-SCNC: 17 MMOL/L (ref 7–15)
APPEARANCE UR: CLEAR
APTT PPP: 30 SECONDS (ref 22–38)
APTT PPP: 48 SECONDS (ref 22–38)
APTT PPP: 51 SECONDS (ref 22–38)
AST SERPL W P-5'-P-CCNC: 44 U/L (ref 0–45)
AST SERPL W P-5'-P-CCNC: 44 U/L (ref 0–45)
BASE EXCESS BLDA CALC-SCNC: -0.1 MMOL/L (ref -3–3)
BASE EXCESS BLDA CALC-SCNC: -4 MMOL/L (ref -3–3)
BASE EXCESS BLDA CALC-SCNC: -4.6 MMOL/L (ref -3–3)
BASE EXCESS BLDA CALC-SCNC: -4.9 MMOL/L (ref -3–3)
BASE EXCESS BLDA CALC-SCNC: -5.5 MMOL/L (ref -3–3)
BASE EXCESS BLDV CALC-SCNC: -0.7 MMOL/L (ref -3–3)
BASOPHILS # BLD MANUAL: 0 10E3/UL (ref 0–0.2)
BASOPHILS NFR BLD MANUAL: 1 %
BILIRUB DIRECT SERPL-MCNC: 0.22 MG/DL (ref 0–0.3)
BILIRUB DIRECT SERPL-MCNC: 0.25 MG/DL (ref 0–0.3)
BILIRUB SERPL-MCNC: 0.7 MG/DL
BILIRUB SERPL-MCNC: 0.8 MG/DL
BILIRUB UR QL STRIP: NEGATIVE
BLD GP AB SCN SERPL QL: NEGATIVE
BLD PROD TYP BPU: NORMAL
BLD PROD TYP BPU: NORMAL
BLOOD COMPONENT TYPE: NORMAL
BLOOD COMPONENT TYPE: NORMAL
BUN SERPL-MCNC: 12.6 MG/DL (ref 8–23)
BUN SERPL-MCNC: 16.4 MG/DL (ref 8–23)
BUN SERPL-MCNC: 19.5 MG/DL (ref 8–23)
CA-I BLD-MCNC: 3.7 MG/DL (ref 4.4–5.2)
CA-I BLD-MCNC: 4.1 MG/DL (ref 4.4–5.2)
CALCIUM SERPL-MCNC: 6.1 MG/DL (ref 8.8–10.4)
CALCIUM SERPL-MCNC: 7.3 MG/DL (ref 8.8–10.4)
CALCIUM SERPL-MCNC: 8.1 MG/DL (ref 8.8–10.4)
CHLORIDE SERPL-SCNC: 100 MMOL/L (ref 98–107)
CHLORIDE SERPL-SCNC: 103 MMOL/L (ref 98–107)
CHLORIDE SERPL-SCNC: 104 MMOL/L (ref 98–107)
CK SERPL-CCNC: 221 U/L (ref 39–308)
CODING SYSTEM: NORMAL
CODING SYSTEM: NORMAL
COLOR UR AUTO: ABNORMAL
CREAT SERPL-MCNC: 0.89 MG/DL (ref 0.67–1.17)
CREAT SERPL-MCNC: 0.92 MG/DL (ref 0.67–1.17)
CREAT SERPL-MCNC: 1.46 MG/DL (ref 0.67–1.17)
CREAT UR-MCNC: 78 MG/DL
CROSSMATCH: NORMAL
EGFRCR SERPLBLD CKD-EPI 2021: 54 ML/MIN/1.73M2
EGFRCR SERPLBLD CKD-EPI 2021: >90 ML/MIN/1.73M2
EGFRCR SERPLBLD CKD-EPI 2021: >90 ML/MIN/1.73M2
EOSINOPHIL # BLD MANUAL: 0 10E3/UL (ref 0–0.7)
EOSINOPHIL NFR BLD MANUAL: 1 %
ERYTHROCYTE [DISTWIDTH] IN BLOOD BY AUTOMATED COUNT: 16.2 % (ref 10–15)
ERYTHROCYTE [DISTWIDTH] IN BLOOD BY AUTOMATED COUNT: 16.2 % (ref 10–15)
ERYTHROCYTE [DISTWIDTH] IN BLOOD BY AUTOMATED COUNT: 16.3 % (ref 10–15)
FIBRINOGEN PPP-MCNC: >1200 MG/DL (ref 170–510)
FLUAV RNA SPEC QL NAA+PROBE: NEGATIVE
FLUBV RNA RESP QL NAA+PROBE: NEGATIVE
GLUCOSE BLDC GLUCOMTR-MCNC: 102 MG/DL (ref 70–99)
GLUCOSE BLDC GLUCOMTR-MCNC: 128 MG/DL (ref 70–99)
GLUCOSE BLDC GLUCOMTR-MCNC: 130 MG/DL (ref 70–99)
GLUCOSE BLDC GLUCOMTR-MCNC: 130 MG/DL (ref 70–99)
GLUCOSE BLDC GLUCOMTR-MCNC: 153 MG/DL (ref 70–99)
GLUCOSE BLDC GLUCOMTR-MCNC: 96 MG/DL (ref 70–99)
GLUCOSE SERPL-MCNC: 103 MG/DL (ref 70–99)
GLUCOSE SERPL-MCNC: 115 MG/DL (ref 70–99)
GLUCOSE SERPL-MCNC: 145 MG/DL (ref 70–99)
GLUCOSE UR STRIP-MCNC: NEGATIVE MG/DL
HCO3 BLD-SCNC: 21 MMOL/L (ref 21–28)
HCO3 BLD-SCNC: 23 MMOL/L (ref 21–28)
HCO3 BLD-SCNC: 24 MMOL/L (ref 21–28)
HCO3 BLDV-SCNC: 23 MMOL/L (ref 21–28)
HCO3 SERPL-SCNC: 19 MMOL/L (ref 22–29)
HCO3 SERPL-SCNC: 19 MMOL/L (ref 22–29)
HCO3 SERPL-SCNC: 22 MMOL/L (ref 22–29)
HCT VFR BLD AUTO: 21.8 % (ref 40–53)
HCT VFR BLD AUTO: 24 % (ref 40–53)
HCT VFR BLD AUTO: 28.2 % (ref 40–53)
HGB BLD-MCNC: 7.1 G/DL (ref 13.3–17.7)
HGB BLD-MCNC: 7.8 G/DL (ref 13.3–17.7)
HGB BLD-MCNC: 9 G/DL (ref 13.3–17.7)
HGB UR QL STRIP: ABNORMAL
HOLD SPECIMEN HIT: NORMAL
HOLD SPECIMEN: NORMAL
INR PPP: 1.17 (ref 0.85–1.15)
ISSUE DATE AND TIME: NORMAL
KETONES UR STRIP-MCNC: NEGATIVE MG/DL
LACTATE SERPL-SCNC: 1.2 MMOL/L (ref 0.7–2)
LACTATE SERPL-SCNC: 2.5 MMOL/L (ref 0.7–2)
LEUKOCYTE ESTERASE UR QL STRIP: NEGATIVE
LIPASE SERPL-CCNC: 16 U/L (ref 13–60)
LVEF ECHO: NORMAL
LYMPHOCYTES # BLD MANUAL: 0.2 10E3/UL (ref 0.8–5.3)
LYMPHOCYTES NFR BLD MANUAL: 13 %
MAGNESIUM SERPL-MCNC: 1.5 MG/DL (ref 1.7–2.3)
MAGNESIUM SERPL-MCNC: 1.6 MG/DL (ref 1.7–2.3)
MCH RBC QN AUTO: 31.8 PG (ref 26.5–33)
MCH RBC QN AUTO: 32.6 PG (ref 26.5–33)
MCH RBC QN AUTO: 33 PG (ref 26.5–33)
MCHC RBC AUTO-ENTMCNC: 31.9 G/DL (ref 31.5–36.5)
MCHC RBC AUTO-ENTMCNC: 32.5 G/DL (ref 31.5–36.5)
MCHC RBC AUTO-ENTMCNC: 32.6 G/DL (ref 31.5–36.5)
MCV RBC AUTO: 100 FL (ref 78–100)
MCV RBC AUTO: 100 FL (ref 78–100)
MCV RBC AUTO: 101 FL (ref 78–100)
MONOCYTES # BLD MANUAL: 0.1 10E3/UL (ref 0–1.3)
MONOCYTES NFR BLD MANUAL: 6 %
MRSA DNA SPEC QL NAA+PROBE: NEGATIVE
NEUTROPHILS # BLD MANUAL: 1.4 10E3/UL (ref 1.6–8.3)
NEUTROPHILS NFR BLD MANUAL: 79 %
NITRATE UR QL: NEGATIVE
NT-PROBNP SERPL-MCNC: 321 PG/ML (ref 0–900)
NT-PROBNP SERPL-MCNC: 543 PG/ML (ref 0–900)
O2/TOTAL GAS SETTING VFR VENT: 100 %
O2/TOTAL GAS SETTING VFR VENT: 100 %
O2/TOTAL GAS SETTING VFR VENT: 60 %
O2/TOTAL GAS SETTING VFR VENT: 60 %
O2/TOTAL GAS SETTING VFR VENT: 70 %
O2/TOTAL GAS SETTING VFR VENT: 95 %
OXYHGB MFR BLDA: 94 % (ref 92–100)
OXYHGB MFR BLDA: 94 % (ref 92–100)
OXYHGB MFR BLDA: 95 % (ref 92–100)
OXYHGB MFR BLDV: 58 % (ref 70–75)
PCO2 BLD: 31 MM HG (ref 35–45)
PCO2 BLD: 38 MM HG (ref 35–45)
PCO2 BLD: 39 MM HG (ref 35–45)
PCO2 BLD: 39 MM HG (ref 35–45)
PCO2 BLD: 70 MM HG (ref 35–45)
PCO2 BLDV: 35 MM HG (ref 40–50)
PEEP: 10 CM H2O
PEEP: 10 CM H2O
PEEP: 14 CM H2O
PEEP: 14 CM H2O
PEEP: 7 CM H2O
PF4 HEPARIN CMPLX AB SER QL: NEGATIVE
PH BLD: 7.14 [PH] (ref 7.35–7.45)
PH BLD: 7.33 [PH] (ref 7.35–7.45)
PH BLD: 7.35 [PH] (ref 7.35–7.45)
PH BLD: 7.35 [PH] (ref 7.35–7.45)
PH BLD: 7.48 [PH] (ref 7.35–7.45)
PH BLDV: 7.43 [PH] (ref 7.32–7.43)
PH UR STRIP: 7 [PH] (ref 5–7)
PHOSPHATE SERPL-MCNC: 3.7 MG/DL (ref 2.5–4.5)
PHOSPHATE SERPL-MCNC: 5.1 MG/DL (ref 2.5–4.5)
PLAT MORPH BLD: ABNORMAL
PLATELET # BLD AUTO: 25 10E3/UL (ref 150–450)
PLATELET # BLD AUTO: 44 10E3/UL (ref 150–450)
PLATELET # BLD AUTO: 45 10E3/UL (ref 150–450)
PO2 BLD: 103 MM HG (ref 80–105)
PO2 BLD: 80 MM HG (ref 80–105)
PO2 BLD: 83 MM HG (ref 80–105)
PO2 BLD: 93 MM HG (ref 80–105)
PO2 BLD: 96 MM HG (ref 80–105)
PO2 BLDV: 34 MM HG (ref 25–47)
POTASSIUM SERPL-SCNC: 3.7 MMOL/L (ref 3.4–5.3)
POTASSIUM SERPL-SCNC: 3.7 MMOL/L (ref 3.4–5.3)
POTASSIUM SERPL-SCNC: 3.8 MMOL/L (ref 3.4–5.3)
POTASSIUM SERPL-SCNC: 4 MMOL/L (ref 3.4–5.3)
PROCALCITONIN SERPL IA-MCNC: 1.29 NG/ML
PROT SERPL-MCNC: 5.6 G/DL (ref 6.4–8.3)
PROT SERPL-MCNC: 6.1 G/DL (ref 6.4–8.3)
RADIOLOGIST FLAGS: ABNORMAL
RBC # BLD AUTO: 2.15 10E6/UL (ref 4.4–5.9)
RBC # BLD AUTO: 2.39 10E6/UL (ref 4.4–5.9)
RBC # BLD AUTO: 2.83 10E6/UL (ref 4.4–5.9)
RBC MORPH BLD: ABNORMAL
RBC URINE: 4 /HPF
RSV RNA SPEC NAA+PROBE: NEGATIVE
SA TARGET DNA: NEGATIVE
SAO2 % BLDA: 95.7 % (ref 96–97)
SAO2 % BLDA: 96 % (ref 96–97)
SAO2 % BLDA: 97 % (ref 96–97)
SAO2 % BLDA: 97.4 % (ref 96–97)
SAO2 % BLDA: 97.7 % (ref 96–97)
SAO2 % BLDV: 59.8 % (ref 70–75)
SARS-COV-2 RNA RESP QL NAA+PROBE: NEGATIVE
SODIUM SERPL-SCNC: 135 MMOL/L (ref 135–145)
SODIUM SERPL-SCNC: 138 MMOL/L (ref 135–145)
SODIUM SERPL-SCNC: 140 MMOL/L (ref 135–145)
SODIUM UR-SCNC: 76 MMOL/L
SP GR UR STRIP: 1 (ref 1–1.03)
SPECIMEN EXP DATE BLD: NORMAL
TROPONIN T SERPL HS-MCNC: 105 NG/L
TROPONIN T SERPL HS-MCNC: 128 NG/L
TROPONIN T SERPL HS-MCNC: 45 NG/L
TROPONIN T SERPL HS-MCNC: 48 NG/L
UNIT ABO/RH: NORMAL
UNIT ABO/RH: NORMAL
UNIT NUMBER: NORMAL
UNIT NUMBER: NORMAL
UNIT STATUS: NORMAL
UNIT STATUS: NORMAL
UNIT TYPE ISBT: 6200
UNIT TYPE ISBT: 6200
UROBILINOGEN UR STRIP-MCNC: NORMAL MG/DL
WBC # BLD AUTO: 1.5 10E3/UL (ref 4–11)
WBC # BLD AUTO: 1.8 10E3/UL (ref 4–11)
WBC # BLD AUTO: 2.3 10E3/UL (ref 4–11)
WBC URINE: <1 /HPF

## 2025-01-01 PROCEDURE — 71045 X-RAY EXAM CHEST 1 VIEW: CPT

## 2025-01-01 PROCEDURE — 250N000011 HC RX IP 250 OP 636: Performed by: INTERNAL MEDICINE

## 2025-01-01 PROCEDURE — 85027 COMPLETE CBC AUTOMATED: CPT | Performed by: INTERNAL MEDICINE

## 2025-01-01 PROCEDURE — C1757 CATH, THROMBECTOMY/EMBOLECT: HCPCS

## 2025-01-01 PROCEDURE — 96366 THER/PROPH/DIAG IV INF ADDON: CPT

## 2025-01-01 PROCEDURE — 36415 COLL VENOUS BLD VENIPUNCTURE: CPT | Performed by: EMERGENCY MEDICINE

## 2025-01-01 PROCEDURE — 200N000001 HC R&B ICU

## 2025-01-01 PROCEDURE — P9037 PLATE PHERES LEUKOREDU IRRAD: HCPCS | Performed by: EMERGENCY MEDICINE

## 2025-01-01 PROCEDURE — 86923 COMPATIBILITY TEST ELECTRIC: CPT

## 2025-01-01 PROCEDURE — 250N000013 HC RX MED GY IP 250 OP 250 PS 637: Performed by: EMERGENCY MEDICINE

## 2025-01-01 PROCEDURE — 36600 WITHDRAWAL OF ARTERIAL BLOOD: CPT

## 2025-01-01 PROCEDURE — 99223 1ST HOSP IP/OBS HIGH 75: CPT | Performed by: INTERNAL MEDICINE

## 2025-01-01 PROCEDURE — 94002 VENT MGMT INPAT INIT DAY: CPT

## 2025-01-01 PROCEDURE — 82805 BLOOD GASES W/O2 SATURATION: CPT | Performed by: STUDENT IN AN ORGANIZED HEALTH CARE EDUCATION/TRAINING PROGRAM

## 2025-01-01 PROCEDURE — 82565 ASSAY OF CREATININE: CPT | Performed by: EMERGENCY MEDICINE

## 2025-01-01 PROCEDURE — 93005 ELECTROCARDIOGRAM TRACING: CPT

## 2025-01-01 PROCEDURE — 999N000259 HC STATISTIC EXTUBATION

## 2025-01-01 PROCEDURE — 83605 ASSAY OF LACTIC ACID: CPT | Performed by: INTERNAL MEDICINE

## 2025-01-01 PROCEDURE — 250N000013 HC RX MED GY IP 250 OP 250 PS 637: Performed by: INTERNAL MEDICINE

## 2025-01-01 PROCEDURE — 82805 BLOOD GASES W/O2 SATURATION: CPT | Performed by: EMERGENCY MEDICINE

## 2025-01-01 PROCEDURE — 80048 BASIC METABOLIC PNL TOTAL CA: CPT | Performed by: STUDENT IN AN ORGANIZED HEALTH CARE EDUCATION/TRAINING PROGRAM

## 2025-01-01 PROCEDURE — 85730 THROMBOPLASTIN TIME PARTIAL: CPT | Performed by: INTERNAL MEDICINE

## 2025-01-01 PROCEDURE — 250N000011 HC RX IP 250 OP 636: Performed by: SURGERY

## 2025-01-01 PROCEDURE — 37185 PRIM ART M-THRMBC SBSQ VSL: CPT

## 2025-01-01 PROCEDURE — 82947 ASSAY GLUCOSE BLOOD QUANT: CPT | Performed by: INTERNAL MEDICINE

## 2025-01-01 PROCEDURE — 83605 ASSAY OF LACTIC ACID: CPT | Performed by: EMERGENCY MEDICINE

## 2025-01-01 PROCEDURE — 31500 INSERT EMERGENCY AIRWAY: CPT | Performed by: INTERNAL MEDICINE

## 2025-01-01 PROCEDURE — 250N000009 HC RX 250: Performed by: STUDENT IN AN ORGANIZED HEALTH CARE EDUCATION/TRAINING PROGRAM

## 2025-01-01 PROCEDURE — 96368 THER/DIAG CONCURRENT INF: CPT

## 2025-01-01 PROCEDURE — 999N000157 HC STATISTIC RCP TIME EA 10 MIN

## 2025-01-01 PROCEDURE — 85041 AUTOMATED RBC COUNT: CPT | Performed by: EMERGENCY MEDICINE

## 2025-01-01 PROCEDURE — 82330 ASSAY OF CALCIUM: CPT | Performed by: INTERNAL MEDICINE

## 2025-01-01 PROCEDURE — 255N000002 HC RX 255 OP 636: Performed by: RADIOLOGY

## 2025-01-01 PROCEDURE — 85610 PROTHROMBIN TIME: CPT | Performed by: EMERGENCY MEDICINE

## 2025-01-01 PROCEDURE — 250N000009 HC RX 250: Performed by: INTERNAL MEDICINE

## 2025-01-01 PROCEDURE — 71275 CT ANGIOGRAPHY CHEST: CPT

## 2025-01-01 PROCEDURE — 85730 THROMBOPLASTIN TIME PARTIAL: CPT | Performed by: EMERGENCY MEDICINE

## 2025-01-01 PROCEDURE — 93306 TTE W/DOPPLER COMPLETE: CPT | Mod: 26 | Performed by: INTERNAL MEDICINE

## 2025-01-01 PROCEDURE — 3E043XZ INTRODUCTION OF VASOPRESSOR INTO CENTRAL VEIN, PERCUTANEOUS APPROACH: ICD-10-PCS | Performed by: INTERNAL MEDICINE

## 2025-01-01 PROCEDURE — 999N000009 HC STATISTIC AIRWAY CARE

## 2025-01-01 PROCEDURE — 258N000003 HC RX IP 258 OP 636: Performed by: INTERNAL MEDICINE

## 2025-01-01 PROCEDURE — 82565 ASSAY OF CREATININE: CPT | Performed by: INTERNAL MEDICINE

## 2025-01-01 PROCEDURE — 02CR3ZZ EXTIRPATION OF MATTER FROM LEFT PULMONARY ARTERY, PERCUTANEOUS APPROACH: ICD-10-PCS | Performed by: RADIOLOGY

## 2025-01-01 PROCEDURE — 272N000054 HC CANNULA HIGH FLOW, ADULT

## 2025-01-01 PROCEDURE — 999N000156 HC STATISTIC RCP CONSULT EA 30 MIN

## 2025-01-01 PROCEDURE — 80048 BASIC METABOLIC PNL TOTAL CA: CPT | Performed by: INTERNAL MEDICINE

## 2025-01-01 PROCEDURE — 37184 PRIM ART M-THRMBC 1ST VSL: CPT

## 2025-01-01 PROCEDURE — 84100 ASSAY OF PHOSPHORUS: CPT | Performed by: INTERNAL MEDICINE

## 2025-01-01 PROCEDURE — 999N000065 XR ABDOMEN PORT 1 VIEW

## 2025-01-01 PROCEDURE — 74177 CT ABD & PELVIS W/CONTRAST: CPT

## 2025-01-01 PROCEDURE — 86900 BLOOD TYPING SEROLOGIC ABO: CPT

## 2025-01-01 PROCEDURE — 83735 ASSAY OF MAGNESIUM: CPT | Performed by: INTERNAL MEDICINE

## 2025-01-01 PROCEDURE — 87637 SARSCOV2&INF A&B&RSV AMP PRB: CPT | Performed by: EMERGENCY MEDICINE

## 2025-01-01 PROCEDURE — 85027 COMPLETE CBC AUTOMATED: CPT | Performed by: EMERGENCY MEDICINE

## 2025-01-01 PROCEDURE — 87040 BLOOD CULTURE FOR BACTERIA: CPT | Performed by: EMERGENCY MEDICINE

## 2025-01-01 PROCEDURE — 36415 COLL VENOUS BLD VENIPUNCTURE: CPT

## 2025-01-01 PROCEDURE — 84145 PROCALCITONIN (PCT): CPT | Performed by: INTERNAL MEDICINE

## 2025-01-01 PROCEDURE — 36015 PLACE CATHETER IN ARTERY: CPT

## 2025-01-01 PROCEDURE — 84300 ASSAY OF URINE SODIUM: CPT | Performed by: INTERNAL MEDICINE

## 2025-01-01 PROCEDURE — 99291 CRITICAL CARE FIRST HOUR: CPT | Mod: 25

## 2025-01-01 PROCEDURE — 99291 CRITICAL CARE FIRST HOUR: CPT | Mod: 25 | Performed by: INTERNAL MEDICINE

## 2025-01-01 PROCEDURE — 82247 BILIRUBIN TOTAL: CPT | Performed by: EMERGENCY MEDICINE

## 2025-01-01 PROCEDURE — 250N000011 HC RX IP 250 OP 636: Performed by: EMERGENCY MEDICINE

## 2025-01-01 PROCEDURE — 96361 HYDRATE IV INFUSION ADD-ON: CPT

## 2025-01-01 PROCEDURE — 272N000194 HC ACCESSORY CR3

## 2025-01-01 PROCEDURE — 82570 ASSAY OF URINE CREATININE: CPT | Performed by: INTERNAL MEDICINE

## 2025-01-01 PROCEDURE — 87633 RESP VIRUS 12-25 TARGETS: CPT | Performed by: STUDENT IN AN ORGANIZED HEALTH CARE EDUCATION/TRAINING PROGRAM

## 2025-01-01 PROCEDURE — 99285 EMERGENCY DEPT VISIT HI MDM: CPT

## 2025-01-01 PROCEDURE — 83690 ASSAY OF LIPASE: CPT | Performed by: EMERGENCY MEDICINE

## 2025-01-01 PROCEDURE — 999N000208 ECHOCARDIOGRAM COMPLETE

## 2025-01-01 PROCEDURE — 85384 FIBRINOGEN ACTIVITY: CPT | Performed by: EMERGENCY MEDICINE

## 2025-01-01 PROCEDURE — 272N000116 HC CATH CR1

## 2025-01-01 PROCEDURE — 86850 RBC ANTIBODY SCREEN: CPT

## 2025-01-01 PROCEDURE — 82550 ASSAY OF CK (CPK): CPT | Performed by: INTERNAL MEDICINE

## 2025-01-01 PROCEDURE — 85007 BL SMEAR W/DIFF WBC COUNT: CPT | Performed by: EMERGENCY MEDICINE

## 2025-01-01 PROCEDURE — 272N000566 HC SHEATH CR3

## 2025-01-01 PROCEDURE — 86901 BLOOD TYPING SEROLOGIC RH(D): CPT

## 2025-01-01 PROCEDURE — 82805 BLOOD GASES W/O2 SATURATION: CPT | Performed by: SURGERY

## 2025-01-01 PROCEDURE — 84484 ASSAY OF TROPONIN QUANT: CPT | Performed by: INTERNAL MEDICINE

## 2025-01-01 PROCEDURE — 999N000065 XR CHEST PORT 1 VIEW

## 2025-01-01 PROCEDURE — 82947 ASSAY GLUCOSE BLOOD QUANT: CPT | Performed by: EMERGENCY MEDICINE

## 2025-01-01 PROCEDURE — 83880 ASSAY OF NATRIURETIC PEPTIDE: CPT | Performed by: EMERGENCY MEDICINE

## 2025-01-01 PROCEDURE — 94003 VENT MGMT INPAT SUBQ DAY: CPT

## 2025-01-01 PROCEDURE — 96365 THER/PROPH/DIAG IV INF INIT: CPT | Mod: 59

## 2025-01-01 PROCEDURE — 94660 CPAP INITIATION&MGMT: CPT

## 2025-01-01 PROCEDURE — 250N000011 HC RX IP 250 OP 636: Performed by: STUDENT IN AN ORGANIZED HEALTH CARE EDUCATION/TRAINING PROGRAM

## 2025-01-01 PROCEDURE — 999N000215 HC STATISTIC HFNC ADULT NON-CPAP

## 2025-01-01 PROCEDURE — 02CQ3ZZ EXTIRPATION OF MATTER FROM RIGHT PULMONARY ARTERY, PERCUTANEOUS APPROACH: ICD-10-PCS | Performed by: RADIOLOGY

## 2025-01-01 PROCEDURE — 36430 TRANSFUSION BLD/BLD COMPNT: CPT

## 2025-01-01 PROCEDURE — 258N000003 HC RX IP 258 OP 636: Performed by: EMERGENCY MEDICINE

## 2025-01-01 PROCEDURE — 99291 CRITICAL CARE FIRST HOUR: CPT | Performed by: INTERNAL MEDICINE

## 2025-01-01 PROCEDURE — 82310 ASSAY OF CALCIUM: CPT | Performed by: EMERGENCY MEDICINE

## 2025-01-01 PROCEDURE — 99292 CRITICAL CARE ADDL 30 MIN: CPT | Mod: 25 | Performed by: INTERNAL MEDICINE

## 2025-01-01 PROCEDURE — 87641 MR-STAPH DNA AMP PROBE: CPT | Performed by: INTERNAL MEDICINE

## 2025-01-01 PROCEDURE — 84132 ASSAY OF SERUM POTASSIUM: CPT | Performed by: INTERNAL MEDICINE

## 2025-01-01 PROCEDURE — C8929 TTE W OR WO FOL WCON,DOPPLER: HCPCS

## 2025-01-01 PROCEDURE — 84484 ASSAY OF TROPONIN QUANT: CPT | Mod: 91 | Performed by: EMERGENCY MEDICINE

## 2025-01-01 PROCEDURE — 82248 BILIRUBIN DIRECT: CPT | Performed by: INTERNAL MEDICINE

## 2025-01-01 PROCEDURE — 82805 BLOOD GASES W/O2 SATURATION: CPT | Performed by: INTERNAL MEDICINE

## 2025-01-01 PROCEDURE — C1769 GUIDE WIRE: HCPCS

## 2025-01-01 PROCEDURE — 272N000196 HC ACCESSORY CR5

## 2025-01-01 PROCEDURE — 5A1935Z RESPIRATORY VENTILATION, LESS THAN 24 CONSECUTIVE HOURS: ICD-10-PCS | Performed by: INTERNAL MEDICINE

## 2025-01-01 PROCEDURE — C1760 CLOSURE DEV, VASC: HCPCS

## 2025-01-01 PROCEDURE — 83880 ASSAY OF NATRIURETIC PEPTIDE: CPT | Performed by: INTERNAL MEDICINE

## 2025-01-01 PROCEDURE — 86022 PLATELET ANTIBODIES: CPT | Performed by: EMERGENCY MEDICINE

## 2025-01-01 PROCEDURE — 250N000009 HC RX 250: Performed by: EMERGENCY MEDICINE

## 2025-01-01 PROCEDURE — 255N000002 HC RX 255 OP 636: Performed by: INTERNAL MEDICINE

## 2025-01-01 PROCEDURE — 82248 BILIRUBIN DIRECT: CPT | Performed by: EMERGENCY MEDICINE

## 2025-01-01 PROCEDURE — 81001 URINALYSIS AUTO W/SCOPE: CPT | Performed by: EMERGENCY MEDICINE

## 2025-01-01 PROCEDURE — 250N000011 HC RX IP 250 OP 636: Mod: JZ | Performed by: EMERGENCY MEDICINE

## 2025-01-01 RX ORDER — ETOMIDATE 2 MG/ML
25 INJECTION INTRAVENOUS ONCE
Status: COMPLETED | OUTPATIENT
Start: 2025-01-01 | End: 2025-01-01

## 2025-01-01 RX ORDER — IOPAMIDOL 755 MG/ML
120 INJECTION, SOLUTION INTRAVASCULAR ONCE
Status: COMPLETED | OUTPATIENT
Start: 2025-01-01 | End: 2025-01-01

## 2025-01-01 RX ORDER — NALOXONE HYDROCHLORIDE 0.4 MG/ML
0.2 INJECTION, SOLUTION INTRAMUSCULAR; INTRAVENOUS; SUBCUTANEOUS
Status: DISCONTINUED | OUTPATIENT
Start: 2025-01-01 | End: 2025-01-01 | Stop reason: HOSPADM

## 2025-01-01 RX ORDER — HYDROMORPHONE HYDROCHLORIDE 1 MG/ML
0.3 INJECTION, SOLUTION INTRAMUSCULAR; INTRAVENOUS; SUBCUTANEOUS ONCE
Status: COMPLETED | OUTPATIENT
Start: 2025-01-01 | End: 2025-01-01

## 2025-01-01 RX ORDER — DEXTROSE MONOHYDRATE 25 G/50ML
25-50 INJECTION, SOLUTION INTRAVENOUS
Status: DISCONTINUED | OUTPATIENT
Start: 2025-01-01 | End: 2025-01-01 | Stop reason: HOSPADM

## 2025-01-01 RX ORDER — ACETAMINOPHEN 325 MG/1
650 TABLET ORAL ONCE
Status: COMPLETED | OUTPATIENT
Start: 2025-01-01 | End: 2025-01-01

## 2025-01-01 RX ORDER — DULOXETIN HYDROCHLORIDE 60 MG/1
60 CAPSULE, DELAYED RELEASE ORAL DAILY
Status: DISCONTINUED | OUTPATIENT
Start: 2025-01-01 | End: 2025-01-01 | Stop reason: HOSPADM

## 2025-01-01 RX ORDER — GABAPENTIN 300 MG/1
300 CAPSULE ORAL DAILY
Status: DISCONTINUED | OUTPATIENT
Start: 2025-01-01 | End: 2025-01-01 | Stop reason: HOSPADM

## 2025-01-01 RX ORDER — HEPARIN SODIUM 10000 [USP'U]/100ML
0-5000 INJECTION, SOLUTION INTRAVENOUS CONTINUOUS
Status: DISCONTINUED | OUTPATIENT
Start: 2025-01-01 | End: 2025-01-01

## 2025-01-01 RX ORDER — NOREPINEPHRINE BITARTRATE 0.02 MG/ML
INJECTION, SOLUTION INTRAVENOUS
Status: COMPLETED
Start: 2025-01-01 | End: 2025-01-01

## 2025-01-01 RX ORDER — HYDROMORPHONE HYDROCHLORIDE 1 MG/ML
.5-1 INJECTION, SOLUTION INTRAMUSCULAR; INTRAVENOUS; SUBCUTANEOUS
Status: DISCONTINUED | OUTPATIENT
Start: 2025-01-01 | End: 2025-01-01 | Stop reason: HOSPADM

## 2025-01-01 RX ORDER — PROPOFOL 10 MG/ML
5-75 INJECTION, EMULSION INTRAVENOUS CONTINUOUS
Status: DISCONTINUED | OUTPATIENT
Start: 2025-01-01 | End: 2025-01-01 | Stop reason: HOSPADM

## 2025-01-01 RX ORDER — LEVOTHYROXINE SODIUM 112 UG/1
112 TABLET ORAL DAILY
Status: DISCONTINUED | OUTPATIENT
Start: 2025-01-01 | End: 2025-01-01 | Stop reason: HOSPADM

## 2025-01-01 RX ORDER — PIPERACILLIN SODIUM, TAZOBACTAM SODIUM 4; .5 G/20ML; G/20ML
4.5 INJECTION, POWDER, LYOPHILIZED, FOR SOLUTION INTRAVENOUS ONCE
Status: COMPLETED | OUTPATIENT
Start: 2025-01-01 | End: 2025-01-01

## 2025-01-01 RX ORDER — LEVOTHYROXINE SODIUM ANHYDROUS 100 UG/5ML
50 INJECTION, POWDER, LYOPHILIZED, FOR SOLUTION INTRAVENOUS DAILY
Status: DISCONTINUED | OUTPATIENT
Start: 2025-01-01 | End: 2025-01-01 | Stop reason: HOSPADM

## 2025-01-01 RX ORDER — FINASTERIDE 5 MG/1
5 TABLET, FILM COATED ORAL DAILY
Status: DISCONTINUED | OUTPATIENT
Start: 2025-01-01 | End: 2025-01-01 | Stop reason: HOSPADM

## 2025-01-01 RX ORDER — CARBOXYMETHYLCELLULOSE SODIUM 5 MG/ML
1 SOLUTION/ DROPS OPHTHALMIC
Status: DISCONTINUED | OUTPATIENT
Start: 2025-01-01 | End: 2025-01-01 | Stop reason: HOSPADM

## 2025-01-01 RX ORDER — LIDOCAINE 40 MG/G
CREAM TOPICAL
Status: DISCONTINUED | OUTPATIENT
Start: 2025-01-01 | End: 2025-01-01 | Stop reason: HOSPADM

## 2025-01-01 RX ORDER — FLUMAZENIL 0.1 MG/ML
0.2 INJECTION, SOLUTION INTRAVENOUS
Status: DISCONTINUED | OUTPATIENT
Start: 2025-01-01 | End: 2025-01-01 | Stop reason: HOSPADM

## 2025-01-01 RX ORDER — NALOXONE HYDROCHLORIDE 0.4 MG/ML
0.4 INJECTION, SOLUTION INTRAMUSCULAR; INTRAVENOUS; SUBCUTANEOUS
Status: DISCONTINUED | OUTPATIENT
Start: 2025-01-01 | End: 2025-01-01 | Stop reason: HOSPADM

## 2025-01-01 RX ORDER — PIPERACILLIN SODIUM, TAZOBACTAM SODIUM 4; .5 G/20ML; G/20ML
4.5 INJECTION, POWDER, LYOPHILIZED, FOR SOLUTION INTRAVENOUS EVERY 6 HOURS
Status: DISCONTINUED | OUTPATIENT
Start: 2025-01-01 | End: 2025-01-01 | Stop reason: HOSPADM

## 2025-01-01 RX ORDER — GABAPENTIN 300 MG/1
600 CAPSULE ORAL AT BEDTIME
Status: DISCONTINUED | OUTPATIENT
Start: 2025-01-01 | End: 2025-01-01 | Stop reason: HOSPADM

## 2025-01-01 RX ORDER — TAMSULOSIN HYDROCHLORIDE 0.4 MG/1
0.4 CAPSULE ORAL DAILY
Status: DISCONTINUED | OUTPATIENT
Start: 2025-01-01 | End: 2025-01-01

## 2025-01-01 RX ORDER — GABAPENTIN 300 MG/1
600 CAPSULE ORAL DAILY
Status: DISCONTINUED | OUTPATIENT
Start: 2025-01-01 | End: 2025-01-01 | Stop reason: HOSPADM

## 2025-01-01 RX ORDER — GABAPENTIN 300 MG/1
CAPSULE ORAL 3 TIMES DAILY
COMMUNITY

## 2025-01-01 RX ORDER — ONDANSETRON 4 MG/1
4 TABLET, ORALLY DISINTEGRATING ORAL EVERY 6 HOURS PRN
Status: DISCONTINUED | OUTPATIENT
Start: 2025-01-01 | End: 2025-01-01 | Stop reason: HOSPADM

## 2025-01-01 RX ORDER — FUROSEMIDE 10 MG/ML
40 INJECTION INTRAMUSCULAR; INTRAVENOUS ONCE
Status: COMPLETED | OUTPATIENT
Start: 2025-01-01 | End: 2025-01-01

## 2025-01-01 RX ORDER — PROCHLORPERAZINE MALEATE 10 MG
10 TABLET ORAL EVERY 6 HOURS PRN
Status: DISCONTINUED | OUTPATIENT
Start: 2025-01-01 | End: 2025-01-01 | Stop reason: HOSPADM

## 2025-01-01 RX ORDER — MAGNESIUM SULFATE HEPTAHYDRATE 40 MG/ML
2 INJECTION, SOLUTION INTRAVENOUS ONCE
Status: COMPLETED | OUTPATIENT
Start: 2025-01-01 | End: 2025-01-01

## 2025-01-01 RX ORDER — MAGNESIUM SULFATE HEPTAHYDRATE 40 MG/ML
4 INJECTION, SOLUTION INTRAVENOUS ONCE
Status: COMPLETED | OUTPATIENT
Start: 2025-01-01 | End: 2025-01-01

## 2025-01-01 RX ORDER — ONDANSETRON 2 MG/ML
4 INJECTION INTRAMUSCULAR; INTRAVENOUS EVERY 6 HOURS PRN
Status: DISCONTINUED | OUTPATIENT
Start: 2025-01-01 | End: 2025-01-01 | Stop reason: HOSPADM

## 2025-01-01 RX ORDER — NOREPINEPHRINE BITARTRATE 0.02 MG/ML
.01-.6 INJECTION, SOLUTION INTRAVENOUS CONTINUOUS
Status: DISCONTINUED | OUTPATIENT
Start: 2025-01-01 | End: 2025-01-01 | Stop reason: HOSPADM

## 2025-01-01 RX ORDER — FENTANYL CITRATE 50 UG/ML
25-50 INJECTION, SOLUTION INTRAMUSCULAR; INTRAVENOUS EVERY 5 MIN PRN
Status: DISCONTINUED | OUTPATIENT
Start: 2025-01-01 | End: 2025-01-01 | Stop reason: HOSPADM

## 2025-01-01 RX ORDER — CHLORHEXIDINE GLUCONATE ORAL RINSE 1.2 MG/ML
15 SOLUTION DENTAL EVERY 12 HOURS
Status: DISCONTINUED | OUTPATIENT
Start: 2025-01-01 | End: 2025-01-01 | Stop reason: HOSPADM

## 2025-01-01 RX ORDER — IOPAMIDOL 612 MG/ML
100 INJECTION, SOLUTION INTRAVASCULAR ONCE
Status: COMPLETED | OUTPATIENT
Start: 2025-01-01 | End: 2025-01-01

## 2025-01-01 RX ORDER — ACETAMINOPHEN 325 MG/1
650 TABLET ORAL EVERY 4 HOURS PRN
Status: DISCONTINUED | OUTPATIENT
Start: 2025-01-01 | End: 2025-01-01 | Stop reason: HOSPADM

## 2025-01-01 RX ORDER — ACETAMINOPHEN 650 MG/1
650 SUPPOSITORY RECTAL EVERY 4 HOURS PRN
Status: DISCONTINUED | OUTPATIENT
Start: 2025-01-01 | End: 2025-01-01 | Stop reason: HOSPADM

## 2025-01-01 RX ORDER — GLYCOPYRROLATE 0.2 MG/ML
0.2 INJECTION, SOLUTION INTRAMUSCULAR; INTRAVENOUS ONCE
Status: COMPLETED | OUTPATIENT
Start: 2025-01-01 | End: 2025-01-01

## 2025-01-01 RX ORDER — GLYCOPYRROLATE 0.2 MG/ML
0.1 INJECTION, SOLUTION INTRAMUSCULAR; INTRAVENOUS EVERY 4 HOURS PRN
Status: DISCONTINUED | OUTPATIENT
Start: 2025-01-01 | End: 2025-01-01 | Stop reason: HOSPADM

## 2025-01-01 RX ORDER — NICOTINE POLACRILEX 4 MG
15-30 LOZENGE BUCCAL
Status: DISCONTINUED | OUTPATIENT
Start: 2025-01-01 | End: 2025-01-01 | Stop reason: HOSPADM

## 2025-01-01 RX ADMIN — GLYCOPYRROLATE 0.1 MG: 0.2 INJECTION, SOLUTION INTRAMUSCULAR; INTRAVENOUS at 10:17

## 2025-01-01 RX ADMIN — SODIUM CHLORIDE 94 ML: 9 INJECTION, SOLUTION INTRAVENOUS at 21:42

## 2025-01-01 RX ADMIN — PIPERACILLIN AND TAZOBACTAM 4.5 G: 4; .5 INJECTION, POWDER, FOR SOLUTION INTRAVENOUS at 23:13

## 2025-01-01 RX ADMIN — CHLORHEXIDINE GLUCONATE 15 ML: 1.2 SOLUTION ORAL at 11:58

## 2025-01-01 RX ADMIN — MIDAZOLAM 4 MG: 1 INJECTION INTRAMUSCULAR; INTRAVENOUS at 10:11

## 2025-01-01 RX ADMIN — Medication 10 MG: at 03:19

## 2025-01-01 RX ADMIN — NOREPINEPHRINE BITARTRATE 0.15 MCG/KG/MIN: 0.02 INJECTION, SOLUTION INTRAVENOUS at 18:43

## 2025-01-01 RX ADMIN — NOREPINEPHRINE BITARTRATE 0.06 MCG/KG/MIN: 0.02 INJECTION, SOLUTION INTRAVENOUS at 14:43

## 2025-01-01 RX ADMIN — NOREPINEPHRINE BITARTRATE 0.2 MCG/KG/MIN: 0.02 INJECTION, SOLUTION INTRAVENOUS at 21:52

## 2025-01-01 RX ADMIN — PROPOFOL 40 MCG/KG/MIN: 10 INJECTION, EMULSION INTRAVENOUS at 20:33

## 2025-01-01 RX ADMIN — NOREPINEPHRINE BITARTRATE 0.2 MCG/KG/MIN: 0.02 INJECTION, SOLUTION INTRAVENOUS at 01:01

## 2025-01-01 RX ADMIN — MAGNESIUM SULFATE HEPTAHYDRATE 2 G: 40 INJECTION, SOLUTION INTRAVENOUS at 08:17

## 2025-01-01 RX ADMIN — FUROSEMIDE 40 MG: 10 INJECTION, SOLUTION INTRAMUSCULAR; INTRAVENOUS at 05:17

## 2025-01-01 RX ADMIN — Medication 10 MG: at 23:19

## 2025-01-01 RX ADMIN — VANCOMYCIN HYDROCHLORIDE 1250 MG: 10 INJECTION, POWDER, LYOPHILIZED, FOR SOLUTION INTRAVENOUS at 01:03

## 2025-01-01 RX ADMIN — VANCOMYCIN HYDROCHLORIDE 2500 MG: 1 INJECTION, POWDER, LYOPHILIZED, FOR SOLUTION INTRAVENOUS at 22:38

## 2025-01-01 RX ADMIN — SODIUM CHLORIDE, POTASSIUM CHLORIDE, SODIUM LACTATE AND CALCIUM CHLORIDE 500 ML: 600; 310; 30; 20 INJECTION, SOLUTION INTRAVENOUS at 08:18

## 2025-01-01 RX ADMIN — GLYCOPYRROLATE 0.2 MG: 0.2 INJECTION, SOLUTION INTRAMUSCULAR; INTRAVENOUS at 09:56

## 2025-01-01 RX ADMIN — ARGATROBAN 0.25 MCG/KG/MIN: 50 INJECTION, SOLUTION INTRAVENOUS at 23:36

## 2025-01-01 RX ADMIN — CHLORHEXIDINE GLUCONATE 15 ML: 1.2 SOLUTION ORAL at 22:03

## 2025-01-01 RX ADMIN — Medication 10 MG: at 18:29

## 2025-01-01 RX ADMIN — PIPERACILLIN AND TAZOBACTAM 4.5 G: 4; .5 INJECTION, POWDER, FOR SOLUTION INTRAVENOUS at 04:53

## 2025-01-01 RX ADMIN — Medication 25 MCG/HR: at 11:42

## 2025-01-01 RX ADMIN — PIPERACILLIN AND TAZOBACTAM 4.5 G: 4; .5 INJECTION, POWDER, FOR SOLUTION INTRAVENOUS at 14:19

## 2025-01-01 RX ADMIN — NOREPINEPHRINE BITARTRATE 0.2 MCG/KG/MIN: 0.02 INJECTION, SOLUTION INTRAVENOUS at 06:47

## 2025-01-01 RX ADMIN — Medication 50 MCG: at 01:40

## 2025-01-01 RX ADMIN — ACETAMINOPHEN 650 MG: 325 TABLET, FILM COATED ORAL at 22:21

## 2025-01-01 RX ADMIN — HYDROMORPHONE HYDROCHLORIDE 0.3 MG: 1 INJECTION, SOLUTION INTRAMUSCULAR; INTRAVENOUS; SUBCUTANEOUS at 03:56

## 2025-01-01 RX ADMIN — MIDAZOLAM 2 MG: 1 INJECTION INTRAMUSCULAR; INTRAVENOUS at 09:57

## 2025-01-01 RX ADMIN — HYDROMORPHONE HYDROCHLORIDE 0.5 MG: 1 INJECTION, SOLUTION INTRAMUSCULAR; INTRAVENOUS; SUBCUTANEOUS at 10:43

## 2025-01-01 RX ADMIN — IOPAMIDOL 64 ML: 612 INJECTION, SOLUTION INTRAVENOUS at 02:50

## 2025-01-01 RX ADMIN — PIPERACILLIN AND TAZOBACTAM 4.5 G: 4; .5 INJECTION, POWDER, FOR SOLUTION INTRAVENOUS at 16:22

## 2025-01-01 RX ADMIN — PIPERACILLIN AND TAZOBACTAM 4.5 G: 4; .5 INJECTION, POWDER, FOR SOLUTION INTRAVENOUS at 05:17

## 2025-01-01 RX ADMIN — ACETAMINOPHEN 650 MG: 650 SUPPOSITORY RECTAL at 06:27

## 2025-01-01 RX ADMIN — PERFLUTREN 10 ML: 6.52 INJECTION, SUSPENSION INTRAVENOUS at 13:36

## 2025-01-01 RX ADMIN — NOREPINEPHRINE BITARTRATE 0.2 MCG/KG/MIN: 0.02 INJECTION, SOLUTION INTRAVENOUS at 03:57

## 2025-01-01 RX ADMIN — PANTOPRAZOLE SODIUM 40 MG: 40 INJECTION, POWDER, FOR SOLUTION INTRAVENOUS at 08:13

## 2025-01-01 RX ADMIN — HYDROMORPHONE HYDROCHLORIDE 0.5 MG: 1 INJECTION, SOLUTION INTRAMUSCULAR; INTRAVENOUS; SUBCUTANEOUS at 06:25

## 2025-01-01 RX ADMIN — PIPERACILLIN AND TAZOBACTAM 4.5 G: 4; .5 INJECTION, POWDER, FOR SOLUTION INTRAVENOUS at 22:31

## 2025-01-01 RX ADMIN — Medication 50 MCG: at 16:41

## 2025-01-01 RX ADMIN — VANCOMYCIN HYDROCHLORIDE 1250 MG: 10 INJECTION, POWDER, LYOPHILIZED, FOR SOLUTION INTRAVENOUS at 12:19

## 2025-01-01 RX ADMIN — Medication 50 MCG: at 21:21

## 2025-01-01 RX ADMIN — PROPOFOL 40 MCG/KG/MIN: 10 INJECTION, EMULSION INTRAVENOUS at 06:47

## 2025-01-01 RX ADMIN — MAGNESIUM SULFATE HEPTAHYDRATE 4 G: 40 INJECTION, SOLUTION INTRAVENOUS at 05:28

## 2025-01-01 RX ADMIN — PROPOFOL 30 MCG/KG/MIN: 10 INJECTION, EMULSION INTRAVENOUS at 16:20

## 2025-01-01 RX ADMIN — IOPAMIDOL 100 ML: 755 INJECTION, SOLUTION INTRAVENOUS at 21:41

## 2025-01-01 RX ADMIN — CHLORHEXIDINE GLUCONATE 15 ML: 1.2 SOLUTION ORAL at 08:13

## 2025-01-01 RX ADMIN — PROPOFOL 40 MCG/KG/MIN: 10 INJECTION, EMULSION INTRAVENOUS at 01:01

## 2025-01-01 RX ADMIN — ETOMIDATE INJECTION 25 MG: 2 SOLUTION INTRAVENOUS at 11:16

## 2025-01-01 RX ADMIN — PROPOFOL 40 MCG/KG/MIN: 10 INJECTION, EMULSION INTRAVENOUS at 03:13

## 2025-01-01 RX ADMIN — ARGATROBAN 0.25 MCG/KG/MIN: 50 INJECTION, SOLUTION INTRAVENOUS at 05:29

## 2025-01-01 RX ADMIN — Medication 10 MG: at 05:36

## 2025-01-01 RX ADMIN — SODIUM CHLORIDE 1000 ML: 9 INJECTION, SOLUTION INTRAVENOUS at 21:00

## 2025-01-01 RX ADMIN — PROPOFOL 10 MCG/KG/MIN: 10 INJECTION, EMULSION INTRAVENOUS at 11:23

## 2025-01-01 RX ADMIN — ARGATROBAN 0.5 MCG/KG/MIN: 1 INJECTION INTRAVENOUS at 23:47

## 2025-01-01 RX ADMIN — ROCURONIUM BROMIDE 100 MG: 10 INJECTION, SOLUTION INTRAVENOUS at 11:16

## 2025-01-01 ASSESSMENT — ACTIVITIES OF DAILY LIVING (ADL)
ADLS_ACUITY_SCORE: 71
ADLS_ACUITY_SCORE: 48
ADLS_ACUITY_SCORE: 71
ADLS_ACUITY_SCORE: 48
ADLS_ACUITY_SCORE: 71
ADLS_ACUITY_SCORE: 52
ADLS_ACUITY_SCORE: 71
ADLS_ACUITY_SCORE: 48
ADLS_ACUITY_SCORE: 71
ADLS_ACUITY_SCORE: 52
ADLS_ACUITY_SCORE: 48
ADLS_ACUITY_SCORE: 48
ADLS_ACUITY_SCORE: 71

## 2025-01-01 ASSESSMENT — COLUMBIA-SUICIDE SEVERITY RATING SCALE - C-SSRS
6. HAVE YOU EVER DONE ANYTHING, STARTED TO DO ANYTHING, OR PREPARED TO DO ANYTHING TO END YOUR LIFE?: NO
1. IN THE PAST MONTH, HAVE YOU WISHED YOU WERE DEAD OR WISHED YOU COULD GO TO SLEEP AND NOT WAKE UP?: NO
2. HAVE YOU ACTUALLY HAD ANY THOUGHTS OF KILLING YOURSELF IN THE PAST MONTH?: NO

## 2025-01-02 ENCOUNTER — VIRTUAL VISIT (OUTPATIENT)
Dept: PALLIATIVE MEDICINE | Facility: CLINIC | Age: 65
End: 2025-01-02
Attending: NURSE PRACTITIONER
Payer: COMMERCIAL

## 2025-01-02 VITALS
HEART RATE: 107 BPM | WEIGHT: 230 LBS | SYSTOLIC BLOOD PRESSURE: 97 MMHG | HEIGHT: 71 IN | DIASTOLIC BLOOD PRESSURE: 71 MMHG | BODY MASS INDEX: 32.2 KG/M2

## 2025-01-02 DIAGNOSIS — D69.49: Primary | ICD-10-CM

## 2025-01-02 DIAGNOSIS — C61 PROSTATE CANCER (H): ICD-10-CM

## 2025-01-02 DIAGNOSIS — G89.3 CANCER ASSOCIATED PAIN: ICD-10-CM

## 2025-01-02 DIAGNOSIS — C80.1 NEUROPATHY ASSOCIATED WITH CANCER (H): ICD-10-CM

## 2025-01-02 DIAGNOSIS — G63 NEUROPATHY ASSOCIATED WITH CANCER (H): ICD-10-CM

## 2025-01-02 DIAGNOSIS — D69.6 THROMBOCYTOPENIA (H): ICD-10-CM

## 2025-01-02 DIAGNOSIS — R50.81 FEVER IN OTHER DISEASES: Primary | ICD-10-CM

## 2025-01-02 LAB
C PNEUM DNA SPEC QL NAA+PROBE: NOT DETECTED
FLUAV H1 2009 PAND RNA SPEC QL NAA+PROBE: NOT DETECTED
FLUAV H1 RNA SPEC QL NAA+PROBE: NOT DETECTED
FLUAV H3 RNA SPEC QL NAA+PROBE: NOT DETECTED
FLUAV RNA SPEC QL NAA+PROBE: NOT DETECTED
FLUBV RNA SPEC QL NAA+PROBE: NOT DETECTED
HADV DNA SPEC QL NAA+PROBE: NOT DETECTED
HCOV PNL SPEC NAA+PROBE: NOT DETECTED
HMPV RNA SPEC QL NAA+PROBE: NOT DETECTED
HPIV1 RNA SPEC QL NAA+PROBE: NOT DETECTED
HPIV2 RNA SPEC QL NAA+PROBE: NOT DETECTED
HPIV3 RNA SPEC QL NAA+PROBE: NOT DETECTED
HPIV4 RNA SPEC QL NAA+PROBE: NOT DETECTED
M PNEUMO DNA SPEC QL NAA+PROBE: NOT DETECTED
RSV RNA SPEC QL NAA+PROBE: NOT DETECTED
RSV RNA SPEC QL NAA+PROBE: NOT DETECTED
RV+EV RNA SPEC QL NAA+PROBE: NOT DETECTED

## 2025-01-02 RX ORDER — HEPARIN SODIUM,PORCINE 10 UNIT/ML
5-20 VIAL (ML) INTRAVENOUS DAILY PRN
OUTPATIENT
Start: 2025-01-02

## 2025-01-02 RX ORDER — EPINEPHRINE 1 MG/ML
0.3 INJECTION, SOLUTION, CONCENTRATE INTRAVENOUS EVERY 5 MIN PRN
OUTPATIENT
Start: 2025-01-02

## 2025-01-02 RX ORDER — DIPHENHYDRAMINE HYDROCHLORIDE 50 MG/ML
50 INJECTION INTRAMUSCULAR; INTRAVENOUS
Start: 2025-01-02

## 2025-01-02 RX ORDER — HEPARIN SODIUM (PORCINE) LOCK FLUSH IV SOLN 100 UNIT/ML 100 UNIT/ML
5 SOLUTION INTRAVENOUS
OUTPATIENT
Start: 2025-01-02

## 2025-01-02 ASSESSMENT — PAIN SCALES - GENERAL: PAINLEVEL_OUTOF10: NO PAIN (0)

## 2025-01-02 NOTE — NURSING NOTE
Current patient location: [address] [facility] [address]    Is the patient currently in the state of [place]? YES    Visit mode:VIDEO    If the visit is dropped, the patient can be reconnected by:VIDEO VISIT: Text to cell phone:   Telephone Information:   Mobile [phone]       Will anyone else be joining the visit? NO  (If patient encounters technical issues they should call [phone]    Are changes needed to the allergy or medication list? Pt stated no changes to allergies and Pt stated no med changes    Are refills needed on medications prescribed by this physician? NO    Rooming Documentation:  Questionnaire(s) completed    Reason for visit: MERCED MOREIRAF

## 2025-01-02 NOTE — PROGRESS NOTES
Virtual Visit Details    Type of service:  Video Visit   Video start: 10:15 AM  Video stop: 10:35 AM    Originating Location (pt. Location): Home    Distant Location (provider location):  On-site  Platform used for Video Visit: [facility]    Palliative Care Outpatient Clinic      Patient ID/Chief Complaint: Walter Reyes 63 year old male who is presenting to the palliative medicine clinic today at the request of Yamilet Espinoza PA-C for a palliative care follow-up secondary to metastatic prostate cancer.   The patient's primary care provider is:  Poncho Ortiz       Impression & Recommendations:  63-year-old male with metastatic prostate cancer with extensive metastatic disease involving bones (scattered throughout the axial and appendicular skeleton, sites involved include the calvarium, clavicles, scapula, bilateral ribs, vertebral bodies, pelvic bones, bilateral femora more extensive on the left, bilateral frontal bones and right  parietal bone consistent with metastatic disease, dural thickening along the lateral right  cerebral convexity and right frontal convexity with associated pachymeningeal enhancement, 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  High risk for recurrence.    Past medical history also includes hyperlipidemia, hypothyroidism, HTN, GERD, Acosta's neuroma, plantar fasciitis, peroneal tendinitis, and mild chemotherapy-induced polyneuropathy.    Just completed radiation for recent onset right facial pain from eye down through cheek  to jaw.  Pain is described as having significant burning component with numbness and tingling.  Pain is improving.    Unfortunately treatment options have been exhausted for prostate cancer.  He is no longer on active  "treatment.        Symptoms/recommendations/discussion:  Healthcare directive present in EMR. Wife Micheline is agent if needed.   Continue fentanyl patch 25 mcg/h.   Continue gabapentin to 600 mg every morning, 900 mg afternoon, 900 mg at bedtime.    Continue Cymbalta to 60 mg daily for neuropathy and mood.   Continue oxycodone IR 5-10 mg every 4 hours as needed--- generally takes only before bed.  Per Walter's request, we discussed hospice today.  Services provided by hospice and primary focus on comfort care discussed.  He does not feel that he is ready for hospice but understands that arranging hospice early to formulate a relationship with that team is encouraged so that care can be better provided when needed.  Due to low-grade fever and cough, oncology contacted per his request for possible chest x-ray and CBC.  Oncology agreed to order.  Palliative care follow-up planned in 2 months.  He knows to call earlier at any time.  His wife Micheline participated in the entire visit today.        How to get a hold of us:  For non-urgent matters, MyChart works best.    For more urgent matters, or if you prefer not to use MyChart, call our clinic nurse coordinator Sherry Palma RN at [phone] or [phone]    We have an on-call number for evenings and weekends. Please call this only if you are having uncontrolled symptoms or serious side effects from your medicines: [phone].     For refills, please give us a week (5 working days) notice. We don't always have providers available everyday to do refills. If you call the day you run out of your medicine, we may not be able to refill it in time, so call 5 days in advance        History:  History gathered today from: patient, family/loved ones, medical chart, outside records including Care Everywhere      PE: BP 97/71   Pulse 107   Ht 1.803 m (5' 11\")   Wt 104.3 kg (230 lb)   BMI 32.08 kg/m     Wt Readings from Last 3 Encounters:   01/02/25 104.3 kg (230 lb)   12/18/24 " 103.4 kg (228 lb)   12/11/24 103.3 kg (227 lb 12.8 oz)       Gen alert, comfortable appearing, NAD.   Head NCAT.  Eyes anicteric without injection  Face symmetric, eyes conjugate  Lungs unlabored, no cough, speaking full sentences  Skin no rashes or lesions evident on face/neck  Neuro Face symmetric, eyes conjugate; speech fluent.  Neuropsych exam normal including affect, sensorium, gross memory, thought processes, and fund of knowledge.         Data reviewed:  I reviewed electrolytes, BUN/creatinine, liver profile, hemoglobin and hematocrit, platelet count, and most recent imaging  Recent oncology notes     database reviewed: 1/2/2025      31 minutes spent on the date of the encounter doing chart review, history and exam, patient education & counseling, documentation and other activities as noted above.        Thank you for involving us in the patient's care.   LATESHA Seaman, [facility] Palliative Care Service

## 2025-01-02 NOTE — PROGRESS NOTES
Called pt to discuss Plts of 24k.  He has persistent nose bleeding often taking 1+ hours to stop that are occurring multiple times per week.  He has other new sites of bruising.  No hematuria, melena, hemoptysis, or bloody stools.      Transfusion plan is signed for Hgb <7 and Plts <30k given his active nasal bleeding with plts >20k.  We will check and transfuse 1x weekly at the moment and more frequently if needed based on his counts.  He is transfusion naive.      We discussed that the safe threshold otherwise is 10k platelets.      He will need blood consent as has not previously needed blood products.     Omar Mcnair MD, PhD   of Medicine   Oncology/BMT/Cellular Therapies

## 2025-01-02 NOTE — PATIENT INSTRUCTIONS
Thank you for meeting with us in the [facility].    How to get a hold of us:  For non-urgent matters, MyChart works best.    For more urgent matters, or if you prefer not to use MyChart, call our clinic nurse coordinator Sherry Palma RN at [phone] or [phone]    We have an on-call number for evenings and weekends. Please call this only if you are having uncontrolled symptoms or serious side effects from your medicines: [phone].     For refills, please give us a week (5 working days) notice. We don't always have providers available everyday to do refills. If you call the day you run out of your medicine, we may not be able to refill it in time, so call 5 days in advance!

## 2025-01-04 PROBLEM — J96.01 ACUTE RESPIRATORY FAILURE WITH HYPOXIA (H): Status: ACTIVE | Noted: 2025-01-01

## 2025-01-04 PROBLEM — A41.9 SEVERE SEPSIS (H): Status: ACTIVE | Noted: 2025-01-01

## 2025-01-04 PROBLEM — I26.02 ACUTE SADDLE PULMONARY EMBOLISM WITH ACUTE COR PULMONALE (H): Status: ACTIVE | Noted: 2025-01-01

## 2025-01-04 PROBLEM — J18.9 PNEUMONIA OF BOTH UPPER LOBES DUE TO INFECTIOUS ORGANISM: Status: ACTIVE | Noted: 2025-01-01

## 2025-01-04 PROBLEM — R65.20 SEVERE SEPSIS (H): Status: ACTIVE | Noted: 2025-01-01

## 2025-01-04 NOTE — PROGRESS NOTES
[facility] ICU RESPIRATORY NOTE        Date of Admission: 1/4/2025    Date of Intubation (most recent): 01/04/2025    Reason for Mechanical Ventilation: Respiratory Failure     Number of Days on Mechanical Ventilation: 1    Met Criteria for Spontaneous Breathing Trial: No    Bite Block: No    Significant Events Today: None    ABG Results:   Recent Labs   Lab 01/04/25  1355 01/04/25  1013 01/03/25 2051   PH 7.14* 7.48*  --    PCO2 70* 31*  --    PO2 103 83  --    HCO3 24 23  --    O2PER 100 100 95         Current Vent Settings: FiO2 (%): 100 %, Resp: 19, Vent Mode: CMV/AC, Resp Rate (Set): 20 breaths/min, Tidal Volume (Set, mL): 500 mL, PEEP (cm H2O): 14 cmH2O, Resp Rate (Set): 20 breaths/min, Tidal Volume (Set, mL): 500 mL, PEEP (cm H2O): 14 cmH2O      Neto Mancini, RT on 1/4/2025 at 5:47 PM

## 2025-01-04 NOTE — ED NOTES
Patient presents to ED with 25mcg Fentanyl patch in place on Left shoulder. Placed on evening of 1/2/2025 per wife.

## 2025-01-04 NOTE — ED PROVIDER NOTES
Emergency Department Note      History of Present Illness     Chief Complaint   Respiratory Distress      HPI   Walter Reyes is a 64 year old male who present as a transfer from the [facility] ED awaiting transfer to IR. The IR team is not ready upon is arrival, so he will remain in the ED until they are ready to accept him.  Briefly, the patient presented to the [facility] ER earlier in the evening with shortness of breath.  He was found to be septic and have a saddle pulmonary embolism and pneumonia.  He was placed on high flow O2 and was provided Zosyn and Vanco as well as argatroban due to having thrombocytopenia, which was thought to be secondary to heparin flushes and his port.  He was transferred here so that he could go to interventional radiology for thrombectomy, after which he is going to be admitted to the ICU.    Review of External Notes   I reviewed the [facility] ED visit from 1/3/25.     Physical Exam     Patient Vitals for the past 24 hrs:   BP Temp Temp src Pulse Resp SpO2   01/04/25 0430 112/74 -- -- 113 26 91 %   01/04/25 0413 129/78 -- -- 108 27 (!) 87 %   01/04/25 0400 107/69 -- -- 101 26 100 %   01/04/25 0330 126/73 99.8  F (37.7  C) Axillary 101 (!) 42 90 %   01/04/25 0316 131/78 -- -- 101 (!) 35 (!) 86 %   01/04/25 0300 121/86 -- -- 101 20 (!) 87 %   01/04/25 0255 120/76 -- -- 101 26 (!) 84 %   01/04/25 0250 128/85 -- -- 102 30 (!) 83 %   01/04/25 0245 116/73 -- -- 107 23 (!) 80 %   01/04/25 0240 107/53 -- -- 101 (!) 33 (!) 84 %   01/04/25 0235 98/65 -- -- 102 27 (!) 87 %   01/04/25 0230 118/66 -- -- 94 25 92 %   01/04/25 0225 102/66 -- -- 98 27 (!) 89 %   01/04/25 0220 107/65 -- -- 101 21 (!) 89 %   01/04/25 0215 106/64 -- -- 98 27 (!) 89 %   01/04/25 0210 108/71 -- -- 98 26 (!) 85 %   01/04/25 0205 92/69 -- -- 98 25 (!) 87 %   01/04/25 0200 110/79 -- -- 98 26 90 %   01/04/25 0155 115/71 -- -- 94 24 (!) 88 %   01/04/25 0150 118/67 -- -- 98 28 (!) 89 %   01/04/25 0145 124/70 -- -- 96 28 (!)  89 %   01/04/25 0121 -- 99.2  F (37.3  C) Temporal -- -- --   01/04/25 0118 104/68 -- -- 102 23 (!) 84 %     Physical Exam  Nursing note and vitals reviewed.  Constitutional:  Oriented to person, place, and time. Cooperative.  On high flow oxygen.  HENT:   Nose:    Nose normal.   Mouth/Throat:   Mucous membranes are normal.   Eyes:    Conjunctivae normal and EOM are normal.      Pupils are equal, round, and reactive to light.   Neck:    Trachea normal.   Cardiovascular:  Tachycardic rate, regular rhythm, normal heart sounds and normal pulses. No murmur heard.  Pulmonary/Chest:  Slightly tachypneic with some coarse breath sounds present.  Abdominal:   Soft. Normal appearance and bowel sounds are normal.      There is no tenderness.      There is no rebound and no CVA tenderness.   Musculoskeletal:  Extremities atraumatic x 4.   Lymphadenopathy:  No cervical adenopathy.   Neurological:   Alert and oriented to person, place, and time. Normal strength.      No cranial nerve deficit or sensory deficit. GCS eye subscore is 4. GCS verbal subscore is 5. GCS motor subscore is 6.   Skin:    Skin is intact. No rash noted.   Psychiatric:   Normal mood and affect.     ED Course      Medications Administered   Medications   glucose gel 15-30 g (has no administration in time range)     Or   dextrose 50 % injection 25-50 mL (has no administration in time range)     Or   glucagon injection 1 mg (has no administration in time range)   Patient is already receiving anticoagulation with heparin, enoxaparin (LOVENOX), warfarin (COUMADIN)  or other anticoagulant medication (has no administration in time range)   HYDROmorphone (PF) (DILAUDID) injection 0.5-1 mg (has no administration in time range)   ondansetron (ZOFRAN ODT) ODT tab 4 mg (has no administration in time range)     Or   ondansetron (ZOFRAN) injection 4 mg (has no administration in time range)   prochlorperazine (COMPAZINE) injection 10 mg (has no administration in time range)      Or   prochlorperazine (COMPAZINE) tablet 10 mg (has no administration in time range)   naloxone (NARCAN) injection 0.2 mg (has no administration in time range)     Or   naloxone (NARCAN) injection 0.4 mg (has no administration in time range)     Or   naloxone (NARCAN) injection 0.2 mg (has no administration in time range)     Or   naloxone (NARCAN) injection 0.4 mg (has no administration in time range)   No lozenges or gum should be given while patient on BIPAP/AVAPS/AVAPS AE (has no administration in time range)   carboxymethylcellulose PF (REFRESH PLUS) 0.5 % ophthalmic solution 1 drop (has no administration in time range)   Patient may continue current oral medications (has no administration in time range)   argatroban (ACOVA) 1 mg/mL ANTICOAGULANT infusion (0.25 mcg/kg/min × 107.5 kg Intravenous $New Bag 1/4/25 0529)   piperacillin-tazobactam (ZOSYN) 4.5 g vial to attach to  mL bag (4.5 g Intravenous $New Bag 1/4/25 0517)   vancomycin (VANCOCIN) 1,250 mg in 0.9% NaCl 262.5 mL intermittent infusion (has no administration in time range)   iopamidol (ISOVUE-300) IV solution 61% 100 mL (64 mLs Arterial $Given 1/4/25 0250)   HYDROmorphone (PF) (DILAUDID) injection 0.3 mg (0.3 mg Intravenous $Given 1/4/25 0356)   furosemide (LASIX) injection 40 mg (40 mg Intravenous $Given 1/4/25 0517)       ED Course   ED Course as of 01/04/25 0539   Sat Jan 04, 2025 0106 Patient arrived via EMS. I obtained history and examined the patient as noted above.       Medical Decision Making / Diagnosis     MATEO Booth Dannyhi is a 64 year old male who briefly stopped in our ER on his way to interventional radiology.  I briefly saw him, and he appeared stable.  He already had the appropriate evaluation and interventions at [facility] ER, and he subsequently was transferred to interventional radiology for thrombectomy.    Disposition   The patient was transferred to IR.     Diagnosis     ICD-10-CM    1. Acute respiratory  failure with hypoxia (H)  J96.01       2. Severe sepsis (H)  A41.9     R65.20       3. Acute saddle pulmonary embolism with acute cor pulmonale (H)  I26.02       4. Pneumonia of both upper lobes due to infectious organism  J18.9       5. Thrombocytopenia (H)  D69.6          Scribe Disclosure:  I, Shwetha Read, am serving as a scribe at 1:12 AM on 1/4/2025 to document services personally performed by Shawn Chaudhry MD based on my observations and the provider's statements to me.        Shawn Chaudhry MD  01/04/25 0585

## 2025-01-04 NOTE — CONSULTS
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 His prostate cancer prognosis is very poor weeks...discussed with family that my recommendation if he doesn't turn around in 24 hours is to proceed with comfort care measures and he will pass away quickly . He is in ARDS and with such poor prognosis from his cancer standpoint he will not be able to be extubated from this current illness    Recommend comfort care if remains same in 24 hours with pulling of the tube , family in agreement. Discussed the plan in coordination with the ICU attending    I will sign offf    Celestino Boyle MD   Total time spent on day of visit, including review of tests, obtaining/reviewing separately obtained history, ordering medications/tests/procedures, communicating with PCP/consultants, and documenting in electronic medical record: 80 minutes       HISTORY OF PRESENT ILLNESS: Walter is a 64 yaer old male who was admitted with sob and was found to have a saddle embolus.  Hx of metastatic prostate cancer sees Dr Mcnair.  Extensive prostate cancer hx reviewed. Also hx of RCC.  Palliative care saw the patient on 1/2/2025 as he has exhausted all treatment options, recent radiation to the brain    Recent admission due to PE, interventional seeing the patient. Also found to have platelets of   24 k drop from 44 K.  Started on argatroban yesterday previous heparin exposure hit testing pending given plts today at 44k. Also being treated for pneumonia    Pt intubated and sedated worseninng respiratory distress this afternoon and on levofed    REVIEW OF SYSTEMS:   14 point ROS was reviewed and is negative other than as noted above in HPI.        MEDICATIONS:  Current Facility-Administered Medications   Medication Dose Route Frequency Provider Last Rate Last Admin    acetaminophen (TYLENOL) tablet 650 mg  650 mg Oral Q4H PRN Jose Ross MD        Or    acetaminophen (TYLENOL) Suppository 650 mg  650 mg Rectal Q4H PRN Jose Ross MD   650 mg at 01/04/25 0627    argatroban (ACOVA) 1 mg/mL ANTICOAGULANT infusion  0.25 mcg/kg/min Intravenous Continuous Jose Ross MD 1.61 mL/hr at 01/04/25 0756 0.25 mcg/kg/min at 01/04/25 0756    carboxymethylcellulose PF (REFRESH PLUS) 0.5 % ophthalmic solution 1 drop  1 drop Both Eyes Q1H PRN Jose Ross MD        chlorhexidine (PERIDEX) 0.12 % solution 15 mL  15 mL Mouth/Throat Q12H Jade Little MD   15 mL at 01/04/25 1158    glucose gel 15-30 g  15-30 g Oral Q15 Min PRN Jose Ross MD        Or    dextrose 50 % injection 25-50 mL  25-50 mL Intravenous Q15 Min PRN Jose Ross MD        Or    glucagon injection 1 mg  1 mg Subcutaneous Q15 Min PRN Jose Ross MD        DULoxetine (CYMBALTA) DR capsule 60 mg  60 mg Oral Daily Silverio Leavitt MD        fentaNYL (SUBLIMAZE) 50 mcg/mL bolus from pump  25-50 mcg Intravenous Q1H PRN Jade Little MD        fentaNYL (SUBLIMAZE) infusion   mcg/hr Intravenous Continuous Jade Little MD 1 mL/hr at 01/04/25 1211 50 mcg/hr at 01/04/25 1211    finasteride (PROSCAR) tablet 5 mg  5 mg Oral Daily Silverio Leavitt MD        [START ON 1/5/2025] gabapentin (NEURONTIN) capsule 300 mg  300 mg Oral Daily Silverio Leavitt MD        gabapentin (NEURONTIN) capsule 600 mg  600 mg Oral Daily Silverio Leavitt MD        gabapentin (NEURONTIN) capsule 600 mg  600 mg Oral At Bedtime Silverio Leavitt MD        HYDROmorphone (PF) (DILAUDID) injection 0.5-1 mg  0.5-1 mg Intravenous Q2H PRN Jose Ross MD   0.5 mg at 01/04/25 1043    levothyroxine (SYNTHROID/LEVOTHROID) tablet 112 mcg  112 mcg Oral Daily Silverio Leavitt MD        propofol (DIPRIVAN) infusion   5-75 mcg/kg/min Intravenous Continuous Jade Little MD 19.4 mL/hr at 01/04/25 1226 30 mcg/kg/min at 01/04/25 1226    And    propofol (DIPRIVAN) bolus from bag or syringe pump  10 mg Intravenous Q15 Min PRN Jade Little MD        And    Medication Instruction   Does not apply Continuous PRN Jade Little MD        naloxone (NARCAN) injection 0.2 mg  0.2 mg Intravenous Q2 Min PRN Jose Ross MD        Or    naloxone (NARCAN) injection 0.4 mg  0.4 mg Intravenous Q2 Min PRN Jose Ross MD        Or    naloxone (NARCAN) injection 0.2 mg  0.2 mg Intramuscular Q2 Min PRN Jose Ross MD        Or    naloxone (NARCAN) injection 0.4 mg  0.4 mg Intramuscular Q2 Min PRN Jose Ross MD        No lozenges or gum should be given while patient on BIPAP/AVAPS/AVAPS AE   Does not apply Continuous PRN Jose Ross MD        ondansetron (ZOFRAN ODT) ODT tab 4 mg  4 mg Oral Q6H PRN Jose Ross MD        Or    ondansetron (ZOFRAN) injection 4 mg  4 mg Intravenous Q6H PRN Jose Ross MD        [START ON 1/5/2025] pantoprazole (PROTONIX) 2 mg/mL suspension 40 mg  40 mg Per Feeding Tube QAM AC Jade Little MD        Or    [START ON 1/5/2025] pantoprazole (PROTONIX) IV push injection 40 mg  40 [facility]        Patient is already receiving anticoagulation with heparin, enoxaparin (LOVENOX), warfarin (COUMADIN)  or other anticoagulant medication   Does not apply Continuous PRN Jose Ross MD        Patient may continue current oral medications   Does not apply Continuous PRN Jose Ross MD        piperacillin-tazobactam (ZOSYN) 4.5 g vial to attach to  mL bag  4.5 g Intravenous Q6H Jose Ross MD   4.5 g at 01/04/25 0517    prochlorperazine (COMPAZINE) injection 10 mg  10 mg Intravenous Q6H PRN Jose Ross MD        Or    prochlorperazine (COMPAZINE) tablet 10 mg  10 mg Oral Q6H PRN Jose Ross MD        vancomycin (VANCOCIN) 1,250 mg in 0.9% NaCl 262.5 mL  intermittent infusion  1,250 mg Intravenous Q12H Jose Ross MD   1,250 mg at 01/04/25 1219         ALLERGIES:  Allergies   Allergen Reactions    Heparin Heparin Induced Thrombocytopenia     Suspected    Amlodipine Other (See Comments)     Leg swelling         PAST MEDICAL HISTORY:  Past Medical History:   Diagnosis Date    Cancer of kidney, right (H)     Complication of anesthesia     slow wakeup     Malignant neoplasm of prostate metastatic to bone (H) 2/14/2019    Thyroid disease          PAST SURGICAL HISTORY:  Past Surgical History:   Procedure Laterality Date    CATARACT EXTRACTION, BILATERAL      CYSTOSCOPY      about 10 years ago    INSERT PORT VASCULAR ACCESS Right 05/02/2019    Procedure: Chest Port Placement;  Surgeon: Tate Burciaga PA-C;  Location: [facility] OR    IR CHEST PORT PLACEMENT > 5 YRS OF AGE  05/02/2019    IR PULMONARY ANGIOGRAM BILATERAL  1/4/2025    LAPAROSCOPIC NEPHRECTOMY Right 03/19/2019    Procedure: Right laparoscopic radical nephrectomy;  Surgeon: Wesley Cleveland MD;  Location: [facility] OR         SOCIAL HISTORY:  Social History     Socioeconomic History    Marital status:      Spouse name: Not on file    Number of children: 4    Years of education: Not on file    Highest education level: Not on file   Occupational History    Not on file   Tobacco Use    Smoking status: Never     Passive exposure: Never    Smokeless tobacco: Never   Vaping Use    Vaping status: Never Used   Substance and Sexual Activity    Alcohol use: Yes     Comment: wine - very rare    Drug use: No    Sexual activity: Not on file   Other Topics Concern    Parent/sibling w/ CABG, MI or angioplasty before 65F 55M? Not Asked   Social History Narrative    Not on file     Social Drivers of Health     Financial Resource Strain: Not At Risk (11/12/2023)    Received from [facility]    Financial Resource Strain     Is it hard for you to pay for the very basics like food, housing,  "medical care or heating?: No   Food Insecurity: Not At Risk (11/12/2023)    Received from [employer]    Food Insecurity     Does your food run out before you have the money to buy more?: No   Transportation Needs: Not At Risk (11/12/2023)    Received from [employer]    Transportation Needs     Does a lack of transportation keep you from your medical appointments or from getting your medications?: No   Physical Activity: Not on file   Stress: Not on file   Social Connections: Not on file   Interpersonal Safety: Not on file   Housing Stability: Not on file         FAMILY HISTORY:  Family History   Problem Relation Age of Onset    Diabetes Father          PHYSICAL EXAM:  Vital signs:  Temp: 100.1  F (37.8  C) Temp src: Axillary BP: 99/58 Pulse: (!) 130   Resp: 18 SpO2: 93 % O2 Device: Mechanical Ventilator Oxygen Delivery: 60 LPM      Estimated body mass index is 33.05 kg/m  as calculated from the following:    Height as of 1/2/25: 1.803 m (5' 11\").    Weight as of 1/3/25: 107.5 kg (237 lb).    Patient is intubated and sedated  Tachycardic        LABS:  CBC RESULTS:   Recent Labs   Lab Test 01/04/25  0442   WBC 1.5*   RBC 2.39*   HGB 7.8*   HCT 24.0*      MCH 32.6   MCHC 32.5   RDW 16.2*   PLT 44*       Recent Labs   Lab Test 01/04/25  1231 01/04/25  0740 01/04/25  0442 01/04/25  0321 01/03/25  2052   NA  --   --  138  --  135   POTASSIUM  --   --  3.7  --  4.0   CHLORIDE  --   --  103  --  100   CO2  --   --  22  --  19*   ANIONGAP  --   --  13  --  16*   * 102* 103*   < > 115*   BUN  --   --  12.6  --  16.4   CR  --   --  0.89  --  0.92   BETHANY  --   --  7.3*  --  8.1*    < > = values in this interval not displayed.         PATHOLOGY:  notd    IMAGING:  noted          Thank you for the opportunity to participate in this patient's care.  Please call with any questions.    Celestino Boyle MD  Hematology/Oncology  [facility]   "

## 2025-01-04 NOTE — PROVIDER NOTIFICATION
01/03/25 0060   Tech Time   $Tech Time (10 minute increments) 6   Mode: CPAP/ BiPAP/ AVAPS/ AVAPS AE   CPAP/BiPAP/ AVAPS/ AVAPS AE Mode other (see comments)  (HFNC)   Equipment   Device HFNC   CPAP/BiPAP/Settings   BIPAP/CPAP On Standby On   Oxygen (%) 100   O2 Flow Rate (L/min) 30   CPAP/BiPAP Patient Parameters   RR Total (breaths/min) 26 breaths/min   CPAP/BiPAP/AVAPS/AVAPS AE Alarms   Humidifier Checked Yes   RT Device Skin Assessment   Oxygen Delivery Device High Flow Nasal Cannula   Ventilator Arm In Place No   Site Appearance nares (inner) Clean and dry   Site Appearance of ears Clean and dry   Strap Tightness Finger Allowance between head and device strap   Device Skin Interventions Taken No adjustments needed   Respiratory WDL   Respiratory WDL X   Rhythm/Pattern, Respiratory assisted mechanically   Expansion/Accessory Muscles/Retractions expansion symmetric;no retractions;no use of accessory muscles     Len Guthrie, RT on 1/3/2025 at 10:36 PM

## 2025-01-04 NOTE — PROGRESS NOTES
Pt was transported to  from [facility].  PT was on high flow  SPO2 was 84-90%    Device transported: high flow      Mayte Carranza, RRT  1/4/2025

## 2025-01-04 NOTE — ED NOTES
Bed: ST03  Expected date: 1/3/25  Expected time:   Means of arrival:   Comments:  Geo Walsh - transfer from [facility] for IR

## 2025-01-04 NOTE — PROGRESS NOTES
ICU Progress Note    Date of Service: 01/04/25    A/P:  64-year-old male with previous history of metastatic prostate carcinoma, chemotherapy induced neuropathy, previous history of renal cell carcinoma s/p nephrectomy, GERD, HTN who initially presented to the outside hospital ED with symptoms of shortness of breath and fever ongoing since 12/30/2024.  Currently, actively undergoing radiotherapy for osseous metastasis. Presented 1/3 with SOB, found to have large volume acute pulmonary embolism with saddle embolus with mild right heart strain.  He underwent pulmonary angiogram with mechanical thrombectomy.  He appeared more short of breath and hypoxic postprocedure and was transferred to the ICU on 60 L HFNC 100% FiO2 with O2 sats 84-90%. Intubated 1/4 for worsening ARDS.    I have personally reviewed the daily labs, imaging studies, cultures and discussed the case with referring physician and consulting physicians.     Neuro  Mechanical ventilation  - Propofol for sedation for mechanical ventilation  - Fentanyl gtt for pain  - PTA gabapentin, duloxetine    Pulmonary  Acute hypoxemic respiratory failure  ARDS  Saddle PE (?chronic) s/p thrombectomy 1/3 with IR  Concern for reperfusion injury following thrombectomy vs pulmonary infarcts bilaterally  Bilateral upper lobe infiltrates, possible community acquired pneumonia  - Attempted to manage with BiPAP through the morning, rapid desaturations when tried to wean  - After discussion with family, they are in favor of a short trial of mechanical ventilation as this may be a reversible process despite his otherwise poor prognosis from his cancer  - Intubated 1/4, requiring high PEEP, see vent settings  - Will repeat ABG now and if P/F < 150 will initiated ARDS protocol with proning, steroids, and possible inhaled vasodilator  - Continue argatroban (see heme)    FiO2 (%): 100 %, Resp: 25, Vent Mode: CMV/AC, Resp Rate (Set): 18 breaths/min, Tidal Volume (Set, mL): 450 mL,  "PEEP (cm H2O): 14 cmH2O, Resp Rate (Set): 18 breaths/min, Tidal Volume (Set, mL): 450 mL, PEEP (cm H2O): 14 cmH2O    Cardiac  PE with right heart strain  S/P IR thrombectomy  - MAP goal > 65, currently not on pressors  - RV function grossly normal on POCUS after thrombectomy suggesting more acute PE  - ECHO pending    Renal  Hx of RCC s/p nephrectomy  - monitor UOP, Cr, I/O  - electrolyte replacement protocols   - PTA flomax changed to finasteride    GI  No active issues  - OG to LIS  - Will start TFs likely tomorrow    Heme/Onc  Metastatic prostate cancer to bone, currently on radiotherapy  Thrombocytopenia, concern for HIT - negative  - Continue argatroban  - HIT panel negative  - monitor CBC  - Transfuse for Plts < 10k    ID  Bilateral upper lobe infiltrate, concern for community acquired pneumonia  - Continue Vanc/Zosyn    Endo  No active issues  - monitor BG    Clinically Significant Risk Factors Present on Admission           # Hypocalcemia: Lowest iCa = 4.1 mg/dL in last 2 days, will monitor and replace as appropriate   # Hypomagnesemia: Lowest Mg = 1.6 mg/dL in last 2 days, will replace as needed   # Hypoalbuminemia: Lowest albumin = 2.8 g/dL at 1/4/2025  4:42 AM, will monitor as appropriate  # Coagulation Defect: INR = 1.17 (Ref range: 0.85 - 1.15) and/or PTT = 48 Seconds (Ref range: 22 - 38 Seconds), will monitor for bleeding  # Thrombocytopenia: Lowest platelets = 24 in last 2 days, will monitor for bleeding   # Hypertension: Noted on problem list     # Acute Hypoxic Respiratory Failure: Documented O2 saturation < 90%. Continue supplemental oxygen as needed   # Anemia: based on hgb <11       # Obesity: Estimated body mass index is 33.05 kg/m  as calculated from the following:    Height as of 1/2/25: 1.803 m (5' 11\").    Weight as of 1/3/25: 107.5 kg (237 lb).           # Anemia: based on hgb <11           PPX  1. DVT: Argatroban gtt   2. VAP: HOB 30 degrees, chlorhexidine rinse  3. Stress Ulcer: PPI  4. " Restraints: Nonviolent soft two point restraints required and necessary for patient safety and continued cares and good effect as patient continues to pull at necessary lines, tubes despite education and distraction. Will readdress daily.   5. Wound care - per unit routine   6. Feeding - NPO  7. Family updated at bedside    Interval Hx:  Worsening respiratory status, intubated today. Reported breathing difficult prior to intubation.    /70   Pulse 107   Temp (!) 101.5  F (38.6  C) (Axillary)   Resp 26   SpO2 98%   Gen: supine, NAD  Neuro: sedated, pupils equal  HEENT: anicteric  Card: Tachycardic, regular rhythm on monitor  Pulm: Intubated. Diminished breath sounds bilaterally.  Abd: soft, non-distended  MSK: no edema, no acute joint abnormality  Skin: no obvious rash    FiO2 (%): 100 %, Resp: 26      Intake/Output Summary (Last 24 hours) at 1/4/2025 0932  Last data filed at 1/4/2025 0800  Gross per 24 hour   Intake 4.05 ml   Output 1200 ml   Net -1195.95 ml       Labs: reviewed    Imaging: reviewed    Billing: Patient is critically ill. Total critical care time today, excluding procedures, was 60 minutes.    Discussed with staff, Dr. Little.    Silverio Leavitt  Surgical Critical Care Fellow

## 2025-01-04 NOTE — PROCEDURES
[facility]    Intubation    Date/Time: 1/4/2025 1:44 PM    Performed by: Jade Little MD  Authorized by: Jade Little MD  Indications: respiratory failure and hypoxemia  Intubation method: video-assisted      UNIVERSAL PROTOCOL   Site Marked: NA  Prior Images Obtained and Reviewed:  NA  Required items: Required blood products, implants, devices and special equipment available    Patient identity confirmed:  Provided demographic data, hospital-assigned identification number and arm band  Patient was reevaluated immediately before administering moderate or deep sedation or anesthesia  Confirmation Checklist:  Patient's identity using two indicators, relevant allergies, procedure was appropriate and matched the consent or emergent situation and correct equipment/implants were available  Time out: Immediately prior to the procedure a time out was called    Universal Protocol: the Joint Commission Universal Protocol was followed      Patient status: paralyzed (RSI)  Preoxygenation: BiPAP.  Paralytic: rocuronium  Sedatives: etomidate  Laryngoscope size: Hyperangulated.  Tube size: 8.0 mm  Tube type: cuffed  Number of attempts: 1  Cricoid pressure: no  Cords visualized: yes  Post-procedure assessment: chest rise, CXR verification and colorimetric ETCO2  Breath sounds: equal  Cuff inflated: yes  ETT to lip: 25 cm  Chest x-ray interpreted by me.  Chest x-ray findings: endotracheal tube in appropriate position  Tube secured with: ETT villatoro, bite block and adhesive tape      PROCEDURE    Length of time physician/provider present for 1:1 monitoring during sedation: 20

## 2025-01-04 NOTE — PROCEDURES
Arterial Line Insertion  Date of Service: 1/4/2025  Pre-procedure diagnosis: Shock  Location: Left radial artery  Performed by: Silverio Leavitt MD    Indications:  The patient is a 64 year old male with a history of metastatic prostate cancer who presented with PE and subsequent ARDS requiring vasopressor management and arterial line placement.    Procedure details:   Consent for the procedure was obtained. The left wrist was prepped and draped in sterile fashion. Anesthesia was achieved with 1% lidocaine. Using ultrasound guidance, the introducer needle was inserted into the radial artery with return of pulsatile flow. A guidewire was advanced through the introducer needle which was then withdrawn. The arterial catheter was advanced over the wire, after which the wire was removed. An arterial waveform was confirmed on the monitor. The catheter was sutured to the skin and a sterile dressing was applied. The patient tolerated the procedure without any hemodynamic compromise.     Dr. Little was available for assistance during the procedure.    Silverio Leavitt  Surgical Critical Care Fellow

## 2025-01-04 NOTE — ED NOTES
Bed: ED09  Expected date:   Expected time:   Means of arrival:   Comments:  Triage IC notify when clean- room is cleaned

## 2025-01-04 NOTE — PROGRESS NOTES
Brief ICU Progress Note    Patient with worsening ARDS through the day following intubation this morning. He has also become increasingly hypotensive, likely from a combination of increased PEEP and medications. Oncology consult this afternoon with a far worse prognosis (1-2 weeks) than initially thought. Discussed at length with family with Oncology staff present. Given worsening ARDS and little to no chance of extubation in the coming days, family would like to transition to comfort cares. They would like family to have a chance to come visit and would like to fully transition tomorrow. They are comfortable with no escalation of cares. Discussed code status and will make DNR.    - Code status changed to DNR  - No escalation of cares, will continue with full vent support and 1 vasopressor, but family asked if increased support to call them with an update  - Anticipate compassionate extubation tomorrow when entire family has had chance to visit    Discussed with staff, Dr. Kiara Leavitt  Surgical Critical Care Fellow

## 2025-01-04 NOTE — PHARMACY-VANCOMYCIN DOSING SERVICE
"Pharmacy Vancomycin Initial Note  Date of Service 2025  Patient's  1960  64 year old, male    Indication: Sepsis    Current estimated CrCl = Estimated Creatinine Clearance: 103.9 mL/min (based on SCr of 0.92 mg/dL).    Creatinine for last 3 days  1/3/2025:  8:52 PM Creatinine 0.92 mg/dL    Recent Vancomycin Level(s) for last 3 days  No results found for requested labs within last 3 days.      Vancomycin IV Administrations (past 72 hours)                     vancomycin (VANCOCIN) 2,500 mg in 0.9% NaCl 525 mL intermittent infusion (mg) 2,500 mg New Bag 25 2238                    Nephrotoxins and other renal medications (From now, onward)      Start     Dose/Rate Route Frequency Ordered Stop    25 1000  vancomycin (VANCOCIN) 1,250 mg in 0.9% NaCl 262.5 mL intermittent infusion         1,250 mg  over 90 Minutes Intravenous EVERY 12 HOURS 25 0457      25 0500  furosemide (LASIX) injection 40 mg         40 mg  over 1-3 Minutes Intravenous ONCE 25 0435      25 0500  piperacillin-tazobactam (ZOSYN) 4.5 g vial to attach to  mL bag        Note to Pharmacy: For [facility], [facility] and [facility]: For Zosyn-naive patients, use the \"Zosyn initial dose + extended infusion\" order panel.    4.5 g  over 30 Minutes Intravenous EVERY 6 HOURS 25 0444              Contrast Orders - past 72 hours (72h ago, onward)      Start     Dose/Rate Route Frequency Stop    25 0200  iopamidol (ISOVUE-300) IV solution 61% 100 mL         100 mL Arterial ONCE 25 0250            [employer] Prediction of Planned Initial Vancomycin Regimen  Loading dose: 2500 mg @ OSH  Regimen: 1250 mg IV every 12 hours.  Start time: 10:00 on 2025  Exposure target: AUC24 (range)400-600 mg/L.hr   AUC24,ss: 531 mg/L.hr  Probability of AUC24 > 400: 78 %  Ctrough,ss: 17.2 mg/L  Probability of Ctrough,ss > 20: 37 %  Probability of nephrotoxicity (Lodise KAREN ): 13 %      Plan:  Start vancomycin 1250 mg IV " q12h.   Vancomycin monitoring method: AUC  Vancomycin therapeutic monitoring goal: 400-600 mg*h/L  Pharmacy will check vancomycin levels as appropriate in 1-3 Days.    Serum creatinine levels will be ordered daily for the first week of therapy and at least twice weekly for subsequent weeks.      Jayleen Stephens RPH

## 2025-01-04 NOTE — IR NOTE
Interventional Radiology Intra-procedural Nursing Note    Patient Name: Walter Reyes  Medical Record Number: [id number]  Today's Date: January 4, 2025    Procedure: pulmonary angiogram with mechanical thrombectomy with moderate sedation  Start time: 0157  End time: 0300  Report provided to: ICU RN  Patient depart time and location: 305 to ICU    Note: Patient entered Interventional Radiology Suite number 1 via cart. Patient awake, alert and oriented. Assisted onto procedural table in supine position. Prepped and draped.  Dr. Wesley in room. Time out and procedure started. Arrived maxed out on HFNC, satting between 80-90% throughout procedure. Telemetry reading sinus.    Procedure well tolerated by patient without complications. Procedure end with debrief by Dr. Wesley.  Manual pressure applied until hemostasis achieved. Gauze/tegaderm dressing applied to right venous femoral interventional procedure access site.    Bedrest for 2 hours until 0500    Administered medication totals:  Lidocaine 1% 9 mL Intradermal

## 2025-01-04 NOTE — ED PROVIDER NOTES
Emergency Department Note      History of Present Illness     Chief Complaint   Shortness of Breath (CA patient, lethargy, fevers, hypoxic to high 70's at home per wife)      HENRIQUE Reyes is a 64 year old male with a history of prediabetes, hypertension, hypothyroidism, renal cell carcinoma s/p nephrectomy, malignant neoplasm of prostate metastatic to bone, and dyslipidemia who presents to the ED with wife for evaluation of shortness of breath. The wife reports that the patient visited a clinic yesterday where she notes his platelets were low at 24, where they also completed a chest x-ray that looked concerning for pneumonia. She states that today, the patient was very exhausted and any exertion caused shortness of breath, which she notes to be abnormal when compared to his normal chemotherapy symptoms, prompting today's visit. The patient endorses having a fever since 12/30/24. He also endorses intermittent coughing, chest pain when coughing, abdominal pain due to coughing, frequent urinary problems such as urinary retention, and incontinence. He denies any sore throat, rashes, or history of respiratory problems. Wife notes that the patient had his last dose of radiation on 12/24/24. The patient mentions taking robitussin for his cough, but it has not resolved. No abdominal pain. He notes rash due to low platelets.     Independent Historian   Wife as detailed above.    Review of External Notes   None    Past Medical History     Medical History and Problem List   Chemotherapy-induced neuropaty  Dysplastic nevus of trunk  Adenomatous polyp of colon  Prediabetes  Renal cell carcinoma  Allergic rhinitis  Diverticulitis  Dyslipidemia  Esophageal reflux  Hypertension  Glucose intolerance  Hypothyroidism  Malignant neoplasm of prostate metastatic to bone  Obesity  Pes cavus  Thrombocytopenia    Medications    Lipitor  Decadron  Cymbalta  Neurontin  Keppra  Synthroid/Levothroid  Deltasone  Flomax    Surgical History   Cataract extraction, bilateral  Cystoscopy  Hernia repair, left  Laparoscopic nephrectomy, right  IR Chest port placement, vascular access, right    Physical Exam     Patient Vitals for the past 24 hrs:   BP Temp Temp src Pulse Resp SpO2 Weight   01/04/25 0000 122/76 -- -- 105 21 93 % --   01/03/25 2345 129/82 -- -- 106 29 94 % --   01/03/25 2330 122/84 -- -- 103 26 94 % --   01/03/25 2315 113/75 -- -- 108 24 94 % --   01/03/25 2300 (!) 135/123 -- -- 109 (!) 33 93 % --   01/03/25 2245 (!) 121/90 -- -- 106 25 95 % --   01/03/25 2215 (!) 153/95 -- -- 113 26 (!) 84 % --   01/03/25 2210 (!) 163/98 (!) 101  F (38.3  C) Axillary 115 29 (!) 80 % --   01/03/25 2130 (!) 137/92 -- -- 105 28 92 % --   01/03/25 2115 125/73 -- -- 105 27 90 % --   01/03/25 2100 123/84 -- -- 114 30 95 % --   01/03/25 2045 -- -- -- 112 (!) 32 95 % --   01/03/25 2039 -- -- -- -- 28 96 % --   01/03/25 2036 -- -- -- -- (!) 33 95 % --   01/03/25 2034 (!) 130/90 100.1  F (37.8  C) Oral 111 26 95 % 107.5 kg (237 lb)     Physical Exam  General: Appears ill. A&O x 3.   Head:  Scalp, face, and head appear normal  Eyes:  Pupils are equal, round    Conjunctivae non-injected and sclerae white  ENT:    The external nose is normal    Pinnae are normal  Neck:  Normal range of motion    There is no rigidity noted    Trachea is in the midline  CV:  Tachycardic rate, regular rhythm      Normal S1/S2, no S3/S4    No murmur or rub. Radial pulses 2+ bilaterally.  Resp:  Lungs are clear and equal bilaterally  + tachypnea    Moderate increased work of breathing    Rales bilateral upper lung fields. Scattered wheezing.   GI:  Abdomen is soft, no rigidity or guarding    No distension, or mass    No tenderness or rebound tenderness   MS:  Normal muscular tone    Symmetric motor strength    No lower extremity edema  Skin:  No rash or acute skin lesions  noted  Neuro:  Awake and alert  Speech is normal and fluent  Moves all extremities spontaneously  Psych: Normal affect. Appropriate interactions.      Diagnostics     Lab Results   Labs Ordered and Resulted from Time of ED Arrival to Time of ED Departure   LACTIC ACID WHOLE BLOOD WITH 1X REPEAT IN 2 HR WHEN >2 - Abnormal       Result Value    Lactic Acid, Initial 2.5 (*)    BASIC METABOLIC PANEL - Abnormal    Sodium 135      Potassium 4.0      Chloride 100      Carbon Dioxide (CO2) 19 (*)     Anion Gap 16 (*)     Urea Nitrogen 16.4      Creatinine 0.92      GFR Estimate >90      Calcium 8.1 (*)     Glucose 115 (*)    HEPATIC FUNCTION PANEL - Abnormal    Protein Total 6.1 (*)     Albumin 3.1 (*)     Bilirubin Total 0.8      Alkaline Phosphatase 261 (*)     AST 44      ALT 53      Bilirubin Direct 0.22     BLOOD GAS VENOUS - Abnormal    pH Venous 7.43      pCO2 Venous 35 (*)     pO2 Venous 34      Bicarbonate Venous 23      Base Excess/Deficit Venous -0.7      FIO2 95      Oxyhemoglobin Venous 58 (*)     O2 Sat, Venous 59.8 (*)    ROUTINE UA WITH MICROSCOPIC REFLEX TO CULTURE - Abnormal    Color Urine Light Yellow      Appearance Urine Clear      Glucose Urine Negative      Bilirubin Urine Negative      Ketones Urine Negative      Specific Gravity Urine 1.005      Blood Urine Small (*)     pH Urine 7.0      Protein Albumin Urine 50 (*)     Urobilinogen Urine Normal      Nitrite Urine Negative      Leukocyte Esterase Urine Negative      RBC Urine 4 (*)     WBC Urine <1     CBC WITH PLATELETS AND DIFFERENTIAL - Abnormal    WBC Count 1.8 (*)     RBC Count 2.83 (*)     Hemoglobin 9.0 (*)     Hematocrit 28.2 (*)           MCH 31.8      MCHC 31.9      RDW 16.2 (*)     Platelet Count 25 (*)    MANUAL DIFFERENTIAL - Abnormal    % Neutrophils 79      % Lymphocytes 13      % Monocytes 6      % Eosinophils 1      % Basophils 1      Absolute Neutrophils 1.4 (*)     Absolute Lymphocytes 0.2 (*)     Absolute Monocytes 0.1       Absolute Eosinophils 0.0      Absolute Basophils 0.0      RBC Morphology Confirmed RBC Indices      Platelet Assessment        Value: Automated Count Confirmed. Platelet morphology is normal.   TROPONIN T, HIGH SENSITIVITY - Abnormal    Troponin T, High Sensitivity 45 (*)    INR - Abnormal    INR 1.17 (*)    FIBRINOGEN ACTIVITY - Abnormal    Fibrinogen Activity >1,200 (*)    TROPONIN T, HIGH SENSITIVITY - Abnormal    Troponin T, High Sensitivity 48 (*)    INFLUENZA A/B, RSV AND SARS-COV2 PCR - Normal    Influenza A PCR Negative      Influenza B PCR Negative      RSV PCR Negative      SARS CoV2 PCR Negative     LIPASE - Normal    Lipase 16     NT PROBNP INPATIENT - Normal    N terminal Pro BNP Inpatient 321     PARTIAL THROMBOPLASTIN TIME - Normal    aPTT 30     HEPARIN INDUCED THROMBOCYTOPENIA REFLEX   PREPARE PHERESED PLATELETS (UNIT)    Blood Component Type Platelets      Product Code [id number]      Unit Status Issued      Unit Number [id number]      CODING SYSTEM [id number]      ISSUE DATE AND TIME [id number]      UNIT ABO/RH A+      UNIT TYPE ISBT 6200     PREPARE PHERESED PLATELETS (UNIT)   BLOOD CULTURE   BLOOD CULTURE   TRANSFUSE PHERESED PLATELETS (UNIT)   HEPARIN INDUCED THROMBOCYTOPENIA REFLEX       Imaging   CT Chest (PE) Abdomen Pelvis w Contrast   Final Result   Abnormal   IMPRESSION:   1.  Large volume acute pulmonary embolism with saddle embolus. Mild right heart strain.      2.  Extensive presumed infectious or inflammatory opacities in the upper lung zones.      3.  Heterogeneously hyperenhancing, nodular appearance of the prostate, with known history of prostatic malignancy.      4.  Similar multifocal osseous metastatic disease.         [Critical Result: New diagnosis of pulmonary embolism]      Finding was identified on 1/3/2025 10:10 PM CST.       1.  Dr. Machuca was contacted by me on 1/3/2025 10:12 PM CST and verbalized understanding of the critical result.           EKG   ECG results from  01/03/25   EKG 12 lead     Value    Systolic Blood Pressure     Diastolic Blood Pressure     Ventricular Rate 113    Atrial Rate 113    VT Interval 128    QRS Duration 64        QTc 447    P Axis 24    R AXIS 29    T Axis 44    Interpretation ECG      Sinus tachycardia with Premature atrial complexes  Low voltage QRS  Borderline ECG  Nonspecific T wave abnormality in Inferior leads  Singh Machuca MD           Independent Interpretation   None    ED Course      Medications Administered   Medications   vancomycin (VANCOCIN) 2,500 mg in 0.9% NaCl 525 mL intermittent infusion (2,500 mg Intravenous $New Bag 1/3/25 2238)   argatroban (ACOVA) 1 mg/mL ANTICOAGULANT infusion (0.5 mcg/kg/min × 107.5 kg Intravenous $New Bag 1/3/25 2347)   piperacillin-tazobactam (ZOSYN) 4.5 g vial to attach to  mL bag (0 g Intravenous Stopped 1/3/25 2320)   sodium chloride 0.9% BOLUS 1,000 mL (0 mLs Intravenous Stopped 1/3/25 2217)   iopamidol (ISOVUE-370) solution 120 mL (100 mLs Intravenous $Given 1/3/25 2141)   Saline CT scan flush (94 mLs Intravenous $Given 1/3/25 2142)   acetaminophen (TYLENOL) tablet 650 mg (650 mg Oral $Given 1/3/25 2221)       Procedures   Procedures     Discussion of Management   See below     ED Course   ED Course as of 01/04/25 0022   Fri Jan 03, 2025 2046 I obtained history and examined the patient as noted above     2148 Patient rechecked.   2212 I spoke with Dr. Eaton of radiology about chest CT   2245 I spoke with Dr. Wesley of IR for consult   2255 I spoke with Dr. Boyle of oncology about patient plan of care       Additional Documentation  None    Medical Decision Making / Diagnosis     CMS Diagnoses: The patient has signs of sepsis   Sepsis ED evaluation   The patient has signs of sepsis as evidenced by:  1. Presence of 2 SIRS criteria, suspected infection, AND  2. Organ dysfunction: Lactic Acidosis with value >2.0 due to sepsis and Thrombocytopenia with PLT count < 100k due to  "sepsis    Time zero:  2106  on 01/03/25 as this was the time when Lactate was resulted and the level was greater than 2.    Lactic Acid Results:  Recent Labs   Lab Test 01/03/25  2051 07/10/20  1442   LACT 2.5* 0.6*       3 Hour Bundle 6 Hour Bundle (Reassessment)   Blood Cultures before IV Antibiotics: Yes  Antibiotics given: see below  Prehospital fluid volume (mL):                     Total fluids given (ED +Pre-hospital):  The patient responded to a lesser volume of IV fluids. The initial volume ordered was 1600 mL.    Repeat Lactic Acid Level: Ordered by reflex for 2 hours after initial lactic acid collection.  Vasopressors: MAP>65 after initial IVF bolus, will continue to monitor fluid status and vital signs.  Repeat perfusion exam: I attest to having performed a repeat sepsis exam and assessment of perfusion at 12:13 AM .   BMI Readings from Last 1 Encounters:   01/03/25 33.05 kg/m        Anti-infectives (From admission through now)      Start     Dose/Rate Route Frequency Ordered Stop    01/03/25 2105  vancomycin (VANCOCIN) 2,500 mg in 0.9% NaCl 525 mL intermittent infusion         2,500 mg  over 120 Minutes Intravenous ONCE 01/03/25 2102 01/03/25 2100  piperacillin-tazobactam (ZOSYN) 4.5 g vial to attach to  mL bag        Note to Pharmacy: For [facility], [facility] and [facility]: For Zosyn-naive patients, use the \"Zosyn initial dose + extended infusion\" order panel.    4.5 g  over 30 Minutes Intravenous ONCE 01/03/25 2059 01/03/25 2320                [facility]   CT for PE was ordered because the patient is high risk for pulmonary embolism.    MATEO Reyes is a 64 year old male presents to the emergency department with evidence of severe sepsis and acute hypoxic respiratory failure.  He was found to be febrile and hypoxic and started on oxymask which was transitioned to high flow nasal cannula.  ED evaluation included CT of the chest abdomen pelvis which reveals large central saddle pulmonary embolus " with evidence of radiographic right heart strain. CT with bilateral upper lobe pneumonia as the likely infectious source for his severe sepsis and fever.  Lactic acid mildly elevated 2.5.  Patient was treated with empiric antibiotics with Zosyn and vancomycin.  Complicating his clinical picture is severe thrombocytopenia with platelet count of 25.  Given the severity of pulmonary embolism which is life-threatening and no evidence of active bleeding the decision was made to initiate anticoagulation.  The case was discussed with Dr. Boyle of oncology/hematology.  Given his acute drop in platelets in the setting of a port with prior heparin exposure due to heparin flushes/locks of the port there was concern for heparin-induced thrombocytopenia and she recommended anticoagulation with argatroban instead of heparin.  Heparin-induced thrombocytopenia laboratory testing was sent.  Fibrinogen level is not suggestive of DIC.  Patient was transfused 1 unit of platelets.  Per Dr. Boyle goal platelet level is greater than 30.  Platelet count should be monitored every 12 hours.  No evidence of any acute intra-abdominal pathology seen on CT.  Patient has known widely metastatic prostate cancer which is again visualized on CT today.    Given the large central saddle PE the case was discussed with Dr. Wesley, IR who recommended emergent transfer to [facility] for emergent thrombectomy. Transfer to the ED at [facility] was arranged with Dr. Valle who accepts the patient for transfer to [facility] ED with plan to proceed emergently to IR.       Critical Care Time:   Upon my evaluation, this patient had a critical illness with high probability of imminent or life-threatening deterioration due to severe sepsis, hypoxic respiratory failure, saddle pulmonary embolism, which required my direct attention, intervention, and personal management.    I have personally provided 65 minutes of critical care time exclusive of time spent on  separately billable procedures. Time includes review of laboratory data, radiology results, discussion with consultants, reassessment of the patient and monitoring for potential decompensation. Interventions were performed as documented above.     Disposition   The patient was transferred to [facility] via EMS. Dr. Valle and Dr. Wesley accepted the patient for transfer.     Diagnosis     ICD-10-CM    1. Acute respiratory failure with hypoxia (H)  J96.01       2. Severe sepsis (H)  A41.9     R65.20       3. Acute saddle pulmonary embolism with acute cor pulmonale (H)  I26.02       4. Pneumonia of both upper lobes due to infectious organism  J18.9                Scribe Disclosure:  I, Murray Kyle, am serving as a scribe at 9:03 PM on 1/3/2025 to document services personally performed by Singh Machuca MD based on my observations and the provider's statements to me.        Singh Machuca MD  01/04/25 0023

## 2025-01-04 NOTE — PROCEDURES
Interventional Radiology Post-Procedure Note    Procedure: Pulmonary angiogram with thrombectomy.    Attending: Hema Wesley MD    Findings: Bilateral pulmonary angiogram with thrombectomy. Chronic appearing thrombus bilaterally (including saddle). Minimal decrease in PAP following.    Plan: Bedrest x 2 hours

## 2025-01-04 NOTE — PROGRESS NOTES
Intubation Note    Date of intubation: 01/04/2025  Time of intubation: 1121   Location of intubation: Replaced by [facility] ICU Rm [id number]   Intubated by: MD  Size of ETT: 8.0  Location: 25cm@Teeth    ETT placement confirmed by: EtCO2 Colorametric, BBS equal, CXR pending      Neto Mancini, RT on 1/4/2025 at 11:32 AM

## 2025-01-04 NOTE — ED TRIAGE NOTES
Patient comes to ED for evaluation of shortness of breath, lethargy, fevers.  Patient with recent treatment for prostate CA. Has been feeling unwell for several days, wife reports low platelets with last lab draws.  Alert and oriented x 4, lethargic. No pain.     Triage Assessment (Adult)       Row Name 01/03/25 2035          Triage Assessment    Airway WDL WDL        Respiratory WDL    Respiratory WDL X;rhythm/pattern     Rhythm/Pattern, Respiratory shortness of breath;tachypneic;shallow        Skin Circulation/Temperature WDL    Skin Circulation/Temperature WDL X        Cardiac WDL    Cardiac WDL X;rhythm     Cardiac Rhythm ST        Peripheral/Neurovascular WDL    Peripheral Neurovascular WDL WDL        Cognitive/Neuro/Behavioral WDL    Cognitive/Neuro/Behavioral WDL WDL

## 2025-01-04 NOTE — H&P
Critical Care  Note      01/04/2025    Name: Walter Reyes MRN#: [id number]   Age: 64 year old YOB: 1960     [facility] [place] Day# 0  ICU DAY #    MV DAY #             Problem List:   Active Problems:    Thrombocytopenia (H)    Acute respiratory failure with hypoxia (H)    Severe sepsis (H)    Acute saddle pulmonary embolism with acute cor pulmonale (H)    Pneumonia of both upper lobes due to infectious organism           Summary/Hospital Course:   Patient is a 64-year-old male with previous history of metastatic prostate carcinoma, chemotherapy induced neuropathy, previous history of renal cell carcinoma s/p nephrectomy, GERD, HTN who initially presented to the outside hospital ED with symptoms of shortness of breath and fever ongoing since 12/30/2024.  Currently, actively undergoing radiotherapy for osseous metastasis.  Last radiotherapy on 12/24.  On 1/2 was found to have new thrombocytopenia with platelet count of 24.    In the ED, workup was significant for a CT PE which showed large volume acute pulmonary embolism with saddle embolus with mild right heart strain.  Extensive groundglass opacities in bilateral upper lobes.  He was given antibiotics, transferred to [facility] for IR angiogram and thrombectomy.  Argatroban was given for anticoagulation per recommendations from heme-onc due to concern for HIT with HIT panel sent.    On transfer here, underwent pulmonary angiogram with mechanical thrombectomy.  Per Dr. Wesley, the thrombi appeared chronic bilaterally with partial removal of the saddle embolus.  He appeared more short of breath and hypoxic postprocedure and was transferred to the ICU on 60 L HFNC 100% FiO2 with O2 sats 84-90%.  Transitioned to BiPAP in the ICU.    In the ICU, patient looks more comfortable on BiPAP.  Complains of minimal shortness of breath.  Main symptom is anxiety and his chronic cancer pain.  Takes oxycodone every day.      Assessment and plan :      Walter Reyes IS a 64 year old male admitted on 1/4/2025 for acute hypoxic respiratory failure secondary to saddle embolus, pneumonia.     I have personally reviewed the daily labs, imaging studies, cultures and discussed the case with referring physician and consulting physicians.     My assessment and plan by system for this patient is as follows:    Neurology/Psychiatry:   1.  Pain management from bone metastasis, neuropathy    -We will switch him over to IV Dilaudid while he is on BiPAP as needed for pain control.  - Med reconciliation to be done in the morning and resume his home pain medications as well as gabapentin, duloxetine.    Cardiovascular:   Saddle pulmonary embolism    - S/p mechanical thrombectomy.  Clots appear to be chronic per IR report with moderate amount of chronic appearing thrombus removed.  - Concern for reperfusion injury due to worsening of shortness of breath and hypoxia postprocedure.  - Continue anticoagulation with argatroban.  - Check an echocardiogram.    Pulmonary/Ventilator Management:   1.  Acute hypoxic respiratory failure  2.  Saddle pulmonary embolus  3.  Diffuse bilateral groundglass opacities, most prominent in the bilateral upper lobes.  Differential includes viral versus bacterial pneumonia, pulmonary edema, underlying pneumonitis.    - Continue broad-spectrum antibiotics including vancomycin and Zosyn.  - Will give 1 dose of IV Lasix for possible pulmonary edema.  - Follow-up final blood and sputum culture results.  - Continue BiPAP for tonight.  Can transition to HFNC in the morning if stable.  - Will resume home dose of dexamethasone post med reconciliation.    GI and Nutrition :   1.  No active issues  - Will remain n.p.o. while on BiPAP.  Can resume p.o. diet in the morning.    Renal/Fluids/Electrolytes:   1.  Lactic acidosis, resolved    Infectious Disease:   1.  Possible underlying pneumonia    -Continue broad-spectrum antibiotics.  - Follow final  culture results.    Endocrine:   1.  Stress induced hyperglycemia    -Continue to monitor glucose levels    Hematology/Oncology:   1.  Metastatic prostate cancer with involvement of bones and RPLN  S/p previous chemotherapy, on hold due to bony progression with pachymeningeal involvement.  Currently undergoing radiotherapy for osseous metastasis  2.  Thrombocytopenia, concern for HIT due to exposure to heparin with his port flushes  Platelet count of 24K.  On argatroban for concerns of HIT.  HIT panel pending  3.  Saddle embolus    -Continue argatroban.  - S/p 1 unit of platelets.  Platelet threshold of 30K.  - No current active signs of bleeding.  - Follow-up HIT panel sent at the outside hospital.  - Hematology/oncology consult.     IV/Access:   1. Venous access -Port-A-Cath  2. Arterial access -none  3.  Plan  - central access required and necessary      ICU Prophylaxis:   1. DVT: On argatroban  2. VAP: N/A  3. Stress Ulcer: PPI/H2 blocker  4. Restraints: Nonviolent soft two point restraints required and necessary for patient safety and continued cares and good effect as patient continues to pull at necessary lines, tubes despite education and distraction. Will readdress daily.   5. Wound care  -   6. Feeding -regular diet  7. Family Update: Updated the patient at the bedside  8. Disposition -will stay in the ICU    Med reconciliation to be done in the morning    Clinically Significant Risk Factors Present on Admission               # Hypoalbuminemia: Lowest albumin = 3.1 g/dL at 1/3/2025  8:52 PM, will monitor as appropriate  # Coagulation Defect: INR = 1.17 (Ref range: 0.85 - 1.15) and/or PTT = 30 Seconds (Ref range: 22 - 38 Seconds), will monitor for bleeding  # Thrombocytopenia: Lowest platelets = 24 in last 2 days, will monitor for bleeding   # Hypertension: Noted on problem list     # Acute Hypoxic Respiratory Failure: Documented O2 saturation < 90%. Continue supplemental oxygen as needed   # Anemia: based  "on hgb <11       # Obesity: Estimated body mass index is 33.05 kg/m  as calculated from the following:    Height as of 1/2/25: 1.803 m (5' 11\").    Weight as of 1/3/25: 107.5 kg (237 lb).           # Anemia: based on hgb <11                        Key Medications:     Current Facility-Administered Medications   Medication Dose Route Frequency Provider Last Rate Last Admin     Current Facility-Administered Medications   Medication Dose Route Frequency Provider Last Rate Last Admin    No lozenges or gum should be given while patient on BIPAP/AVAPS/AVAPS AE   Does not apply Continuous PRN Jose Ross MD        Patient is already receiving anticoagulation with heparin, enoxaparin (LOVENOX), warfarin (COUMADIN)  or other anticoagulant medication   Does not apply Continuous PRN Jose Ross MD        Patient may continue current oral medications   Does not apply Continuous PRN Jose Ross MD                  Physical Examination:   Temp:  [99.2  F (37.3  C)-101  F (38.3  C)] 99.8  F (37.7  C)  Pulse:  [] 101  Resp:  [20-42] 26  BP: ()/() 107/69  FiO2 (%):  [90 %-100 %] 100 %  SpO2:  [80 %-100 %] 100 %  No intake or output data in the 24 hours ending 01/04/25 0413  Wt Readings from Last 4 Encounters:   01/03/25 107.5 kg (237 lb)   01/02/25 104.3 kg (230 lb)   12/18/24 103.4 kg (228 lb)   12/11/24 103.3 kg (227 lb 12.8 oz)     BP - Mean:  [] 80  FiO2 (%): 100 %, Resp: 26  Recent Labs   Lab 01/03/25  2051   O2PER 95       GEN: no acute distress, comfortable on BiPAP.  HEENT: head ncat, sclera anicteric, OP patent, trachea midline   PULM: Diminished breath sounds bilaterally  CV/COR: RRR S1S2 no gallop,  No rub, no murmur  ABD: soft nontender, hypoactive bowel sounds, no mass  EXT:  warm and well perfused x4  NEURO: PERRL, no obvious deficits  SKIN: no obvious rash  LINES: clean, dry intact         Data:   All data and imaging reviewed     ROUTINE ICU LABS (Last four results)  CMP  Recent " "Labs   Lab 01/04/25  0321 01/03/25 2052   NA  --  135   POTASSIUM  --  4.0   CHLORIDE  --  100   CO2  --  19*   ANIONGAP  --  16*   GLC 96 115*   BUN  --  16.4   CR  --  0.92   GFRESTIMATED  --  >90   BETHANY  --  8.1*   PROTTOTAL  --  6.1*   ALBUMIN  --  3.1*   BILITOTAL  --  0.8   ALKPHOS  --  261*   AST  --  44   ALT  --  53     CBC  Recent Labs   Lab 01/03/25 2051 01/02/25  1314   WBC 1.8* 2.1*   RBC 2.83* 3.15*   HGB 9.0* 10.3*   HCT 28.2* 32.1*    102*   MCH 31.8 32.7   MCHC 31.9 32.1   RDW 16.2* 16.2*   PLT 25* 24*     INR  Recent Labs   Lab 01/03/25 2051   INR 1.17*     Arterial Blood Gas  Recent Labs   Lab 01/03/25 2051   O2PER 95       All cultures:  No results for input(s): \"CULT\" in the last 168 hours.  Recent Results (from the past 24 hours)   CT Chest (PE) Abdomen Pelvis w Contrast   Result Value    Radiologist flags New diagnosis of pulmonary embolism (AA)    Narrative    EXAM: CT CHEST PE ABDOMEN PELVIS W CONTRAST  LOCATION: [facility]  DATE: 1/3/2025    INDICATION: hypoxic respiratory failure, sepsis, metastatic prostate cancer, eval for PE pneumonia  COMPARISON: CT chest, abdomen, and pelvis from 11/18/2024.  TECHNIQUE: CT chest pulmonary angiogram and routine CT abdomen pelvis with IV contrast. Arterial phase through the chest and venous phase through the abdomen and pelvis. Multiplanar reformats and MIP reconstructions were performed. Dose reduction   techniques were used.   CONTRAST: 100mL Isovue 370    FINDINGS:  ANGIOGRAM CHEST: Examination positive for acute bilateral pulmonary embolism including saddle embolism which extends along the pulmonary artery bifurcation. Emboli continue into the lobar pulmonary arteries as well as the segmental arteries of all lobes   of both lungs. Greatest distal clot burden is in the right lower lobe. Thoracic aorta is negative for dissection. There is mild right heart strain by CT.     LUNGS AND PLEURA: Extensive, geographic " opacities in the upper lung zones, primarily groundglass though with interspersed areas of more dense airspace consolidation. There is some bilateral lower lobe bronchial wall thickening with associated small   tree-in-bud distal opacities bilaterally. No pneumothorax or pleural effusion.    MEDIASTINUM/AXILLAE: Large hiatal hernia. Coronary artery calcifications. Multiple nonenlarged mediastinal lymph nodes.    CORONARY ARTERY CALCIFICATION: Moderate.    HEPATOBILIARY: Normal.    PANCREAS: Normal.    SPLEEN: Normal.    ADRENAL GLANDS: Normal.    KIDNEYS/BLADDER: Status post right nephrectomy. No focal abnormality in the nephrectomy bed. A single tiny, nonobstructing left lower pole stone. No left hydronephrosis. Bladder is within normal limits.    BOWEL: No bowel obstruction. Colonic diverticulosis. Normal appendix.    LYMPH NODES: A few nonenlarged retroperitoneal lymph nodes.    VASCULATURE: Mild atherosclerotic changes.    PELVIC ORGANS: The prostate is again noted to be heterogeneously hyperenhancing and mildly nodular.    MUSCULOSKELETAL: Redemonstrated sclerotic osseous metastatic disease with similar sites of involvement again involving multiple thoracolumbar vertebral bodies, the left scapula, multiple ribs, the pelvis, and the right proximal femur.      Impression    IMPRESSION:  1.  Large volume acute pulmonary embolism with saddle embolus. Mild right heart strain.    2.  Extensive presumed infectious or inflammatory opacities in the upper lung zones.    3.  Heterogeneously hyperenhancing, nodular appearance of the prostate, with known history of prostatic malignancy.    4.  Similar multifocal osseous metastatic disease.      [Critical Result: New diagnosis of pulmonary embolism]    Finding was identified on 1/3/2025 10:10 PM CST.     1.  Dr. Machuca was contacted by me on 1/3/2025 10:12 PM CST and verbalized understanding of the critical result.    IR Pulmonary Angiogram Bilateral    Narrative    [facility]  LOCATION: [facility]  DATE: 1/4/2025    PROCEDURE:   1.  SELECTIVE AND SUPERSELECTIVE BILATERAL PULMONARY ARTERIOGRAPHY.  2.  EXTIRPATION OF MATTER FROM THE BILATERAL PULMONARY ARTERIES, PERCUTANEOUS APPROACH.  3.  INTRA-ARTERIAL PRESSURE MEASUREMENTS.  4.  ULTRASOUND GUIDANCE FOR VASCULAR ACCESS.  5.  CLOSURE DEVICE.  6.  MODERATE SEDATION.    INTERVENTIONAL RADIOLOGIST: Hema Wesley M.D.    INDICATION: 64-year-old male with bilateral pulmonary embolism including saddle embolus. Pulmonary embolectomy is requested.    CONSENT: The risks, benefits and alternatives of the stated procedure were discussed with the patient and the patient's wife in detail. All questions were answered. Informed consent was given to proceed with the procedure.    MODERATE SEDATION: None.    CONTRAST: 64 mL Isovue-300  ANTIBIOTICS: None.  ADDITIONAL MEDICATIONS: None.    FLUOROSCOPIC TIME: 9.7 minutes.  RADIATION DOSE: Air Kerma: 144 mGy.    COMPLICATIONS: No immediate complications.    STERILE BARRIER TECHNIQUE: Maximum sterile barrier technique was used. Cutaneous antisepsis was performed at the operative site with application of 2% chlorhexidine and large sterile drape. Prior to the procedure, the  and assistant performed   hand hygiene and wore hat, mask, sterile gown, and sterile gloves during the entire procedure.    PROCEDURE:    The procedure, including the risks, benefits, and alternatives to the procedure itself were discussed with the patient. When all of their questions were answered informed written and verbal consent was obtained. The patient was then brought to the   Interventional Radiology suite, placed in a supine position, and the patient's right groin was sterilely prepped and draped. The right common femoral vein was noted to be ultrasound patent. After giving local anesthesia with lidocaine, the right common   femoral vein was punctured with a 21 gauge needle, under ultrasound  guidance with a permanent image stored. A 0.018 inch wire advanced through the needle into the external iliac artery under fluoroscopic guidance. The needle was then exchanged over the   wire for a 4 [place] coaxial dilator. The inner 3 [place] dilator and 0.018 inch wire were then exchanged for a 0.035 inch guidewire. The outer 4 [place] dilator was then exchanged over the guidewire for a 6 [place] vascular sheath.      Utilizing preclose technique 2 Perclose suture devices were deployed at the right common femoral venous access site in standard fashion. The 6 [place] sheath was removed over a 0.035 inch Amplatz wire. Serial dilatation was performed to 20 [place] followed   by a 24 [place] dry seal sheath.    A 7 [place] Grollman-type catheter was advanced over a 0.035 inch Pebbles Interfacesson wire to the level of the right atrium followed by the right ventricle where pressure measurements were obtained. The catheter was then manipulated into the left followed by right   pulmonary artery where pressure measurements were obtained. Digital subtraction right sided angiography was performed.    A 0.035 inch 1 cm floppy tip Amplatz wire was manipulated into the right lower lobe segmental branch (greater than third order). A 24 [place] FlowTriever catheter was advanced into the main right pulmonary artery and primary percutaneous transluminal   mechanical thrombectomy (extirpation of matter) was performed with removal of a moderate amount of chronic appearing thrombus from the main right and right lower and right upper lobe pulmonary arteries. Pressure measurement was obtained.     The catheter was retracted in the main pulmonary artery. A 5 [place] KMP catheter, with the aid of a 0.035 inch angled Glidewire, was directed through the 24 [place] catheter and directed into the left pulmonary artery. Pressure measurements were obtained.   A 24 [place] FlowTriever catheter was advanced into the main left pulmonary artery and primary percutaneous  transluminal mechanical thrombectomy (extirpation of matter) was performed with removal of a moderate amount of chronic appearing thrombus from   the main and left lower pulmonary arteries. Pressure measurement was obtained. Pressure measurements were obtained throughout as the catheter was removed.    The catheters and sheaths were removed. Hemostasis was achieved via the previously placed Perclose suture devices.    FINDINGS:  Ultrasound demonstrates a patent and fully compressible right common femoral vein. A permanent image was stored.    The digital subtraction pulmonary arteriography shows moderate thrombus within the right and left pulmonary arteries.         Impression    IMPRESSION:      1.  Primary percutaneous transluminal mechanical thrombectomy (extirpation of matter) from the bilateral pulmonary arterial systems as detailed above.    PULMONARY ARTERIAL PRESSURES (mmHg):     Right atrium prior to thrombectomy: 5  right atrium following thrombectomy: 12    Right ventricle prior to thrombectomy: 14  right ventricle following thrombectomy: 21    Main pulmonary artery prior to thrombectomy: 19  Main pulmonary artery following thrombectomy: 20    Right pulmonary artery prior to thrombectomy: 22  Right pulmonary artery following thrombectomy: 18    Left pulmonary artery prior to thrombectomy: 20  Left pulmonary artery following thrombectomy: 13           Billing: This patient is critically ill: Yes. Total critical care time today 60 min, excluding procedures.       Past Medical/surgical hx, meds, allergies, social history, family history     Past Medical History:   Diagnosis Date    Cancer of kidney, right (H)     Complication of anesthesia     slow wakeup     Malignant neoplasm of prostate metastatic to bone (H) 2/14/2019    Thyroid disease      Past Surgical History:   Procedure Laterality Date    CATARACT EXTRACTION, BILATERAL      CYSTOSCOPY      about 10 years ago    INSERT PORT VASCULAR ACCESS Right  05/02/2019    Procedure: Chest Port Placement;  Surgeon: Tate Burciaga PA-C;  Location: [facility] OR    IR CHEST PORT PLACEMENT > 5 YRS OF AGE  05/02/2019    IR PULMONARY ANGIOGRAM BILATERAL  1/4/2025    LAPAROSCOPIC NEPHRECTOMY Right 03/19/2019    Procedure: Right laparoscopic radical nephrectomy;  Surgeon: Wesley Cleveland MD;  Location:  OR     Current Facility-Administered Medications   Medication Dose Route Frequency Provider Last Rate Last Admin    carboxymethylcellulose PF (REFRESH PLUS) 0.5 % ophthalmic solution 1 drop  1 drop Both Eyes Q1H PRN Jose Ross MD        glucose gel 15-30 g  15-30 g Oral Q15 Min PRN Jose Ross MD        Or    dextrose 50 % injection 25-50 mL  25-50 mL Intravenous Q15 Min PRN Jose Ross MD        Or    glucagon injection 1 mg  1 mg Subcutaneous Q15 Min PRN Jose Ross MD        HYDROmorphone (PF) (DILAUDID) injection 0.5-1 mg  0.5-1 mg Intravenous Q2H PRN Jose Ross MD        naloxone (NARCAN) injection 0.2 mg  0.2 mg Intravenous Q2 Min PRN Jose Ross MD        Or    naloxone (NARCAN) injection 0.4 mg  0.4 mg Intravenous Q2 Min PRN Jose Ross MD        Or    naloxone (NARCAN) injection 0.2 mg  0.2 mg Intramuscular Q2 Min PRN Jose Ross MD        Or    naloxone (NARCAN) injection 0.4 mg  0.4 mg Intramuscular Q2 Min PRN Jose Ross MD        No lozenges or gum should be given while patient on BIPAP/AVAPS/AVAPS AE   Does not apply Continuous PRN Jose Ross MD        ondansetron (ZOFRAN ODT) ODT tab 4 mg  4 mg Oral Q6H PRN Jose Ross MD        Or    ondansetron (ZOFRAN) injection 4 mg  4 mg Intravenous Q6H PRN Jose Ross MD        Patient is already receiving anticoagulation with heparin, enoxaparin (LOVENOX), warfarin (COUMADIN)  or other anticoagulant medication   Does not apply Continuous PRN Jose Ross MD        Patient may continue current oral medications   Does not apply Continuous PRN Jose Ross  MD        prochlorperazine (COMPAZINE) injection 10 mg  10 mg Intravenous Q6H PRN Jose Ross MD        Or    prochlorperazine (COMPAZINE) tablet 10 mg  10 mg Oral Q6H PRN Jose Ross MD            Allergies   Allergen Reactions    Heparin Heparin Induced Thrombocytopenia     Suspected    Amlodipine Other (See Comments)     Leg swelling     Social History     Socioeconomic History    Marital status:      Spouse name: Not on file    Number of children: 4    Years of education: Not on file    Highest education level: Not on file   Occupational History    Not on file   Tobacco Use    Smoking status: Never     Passive exposure: Never    Smokeless tobacco: Never   Vaping Use    Vaping status: Never Used   Substance and Sexual Activity    Alcohol use: Yes     Comment: wine - very rare    Drug use: No    Sexual activity: Not on file   Other Topics Concern    Parent/sibling w/ CABG, MI or angioplasty before 65F 55M? Not Asked   Social History Narrative    Not on file     Social Drivers of Health     Financial Resource Strain: Not At Risk (11/12/2023)    Received from [facility]    Financial Resource Strain     Is it hard for you to pay for the very basics like food, housing, medical care or heating?: No   Food Insecurity: Not At Risk (11/12/2023)    Received from [facility]    Food Insecurity     Does your food run out before you have the money to buy more?: No   Transportation Needs: Not At Risk (11/12/2023)    [facility]    Transportation Needs     Does a lack of transportation keep you from your medical appointments or from getting your medications?: No   Physical Activity: Not on file   Stress: Not on file   Social Connections: Not on file   Interpersonal Safety: Not on file   Housing Stability: Not on file     Family History   Problem Relation Age of Onset    Diabetes Father                       I personally spent 60 minutes on  the date of the encounter doing chart review, history and exam, documentation and further activities per the note.     AVA Saha   of Medicine  [facility]  Pulmonary, Allergy, Critical Care and Sleep Medicine  Pager - [phone]

## 2025-01-04 NOTE — PROGRESS NOTES
ICU crosscover:    ARDS and was requiring high vent settings with low P to F ratio and CO2 elevated on the last ABG.  Plan was to start inhaled epoprostenol, obtain entral line access, go up on sedation for eventual positioning per discussion with Dr. Little.   However, after discussing the patient's poor prognosis related to his prostate cancer with Dr. Boyle from oncology, Dr. Leavitt spoke with the family in addition with Dr. Boyle about appropriate limits to care.  After discussion, it was decided that no further escalation of intensive therapies would be added including holding off on additional procedures (including proning) and additional vasopressors be on peripheral norepinephrine.    At this time we will also hold off on steroids and inhaled epoprostenol for now though these are reasonable to start.  Will continue fluid restriction as part of ARDS treatment.  Additionally, also adjusted the ventilator reducing FiO2 and PEEP increasing inspiratory time, tidal volume to 7 cc/kg, and respiratory rate as ventilator revealed flow starvation and premature cycling.   SpO2 remained >92% following changes.   Patient's respiratory rate and tidal volume worked well above set values so goal will be to slightly increase sedation with new RASS goal -2/-3.  Will obtain ABG 1 hour after change.   Continue antibiotics, systemic A/C, and norepi (wean as able for MAP goal >65) otherwise.  If decompensates beyond capabilities of treatment listed above, inform family members of patient's status before additional treatments.  Hope is for additional family to arrive by tomorrow.    CC time: 35 minutes in addition to time spent by Dr. Little.    Delano Craig MD  [facility],  of Medicine  Pulmonary/Critical Care Medicine  January 4, 2025

## 2025-01-04 NOTE — PHARMACY-ADMISSION MEDICATION HISTORY
Pharmacy Intern Admission Medication History    Admission medication history is complete. The information provided in this note is only as accurate as the sources available at the time of the update.    Information Source(s): Family member and CareEverywhere/SureScripts via in-person    Pertinent Information:   All information provided by wife  72 hour fentanyl patch was placed on 1/2     Changes made to PTA medication list:  Added: None  Deleted: Dexamethasone, ivermectin, keppra, zofran, prednisone, compazine   Changed: Gabapentin schedule, atorvastatin 80mg -> 40mg, oxycodone PRN -> once daily    Allergies reviewed with patient and updates made in EHR: yes    Medication History Completed By: Marian Crews 1/4/2025 10:04 AM    PTA Med List   Medication Sig Last Dose/Taking    acetaminophen (TYLENOL) 325 MG tablet Take 325-650 mg by mouth every 6 hours as needed for mild pain. Extra strength Taking As Needed    atorvastatin (LIPITOR) 40 MG tablet Take 40 mg by mouth daily. 1/3/2025    BERBERINE CHLORIDE PO Take 2,400 mg by mouth daily. 1/2/2025    calcium citrate-vitamin D (CITRACAL) 315-250 MG-UNIT TABS per tablet Take 2 tablets by mouth 2 times daily. 1/2/2025    cycloSPORINE (RESTASIS) 0.05 % ophthalmic emulsion Place 1 drop into both eyes daily. 1/3/2025    DULoxetine (CYMBALTA) 60 MG capsule Take 1 capsule (60 mg) by mouth daily. 1/3/2025    fentaNYL (DURAGESIC) 25 mcg/hr 72 hr patch Place 1 patch onto the skin every 72 hours. Remove old patch when placing new patch. 1/2/2025    gabapentin (NEURONTIN) 300 MG capsule Take by mouth 3 times daily. Take 1 capsule (300 mg) by mouth every morning AND 2 capsules (600 mg) daily at 2 pm AND 2 capsules (600 mg) at bedtime. 1/3/2025    levothyroxine (SYNTHROID/LEVOTHROID) 112 MCG tablet Take 112 mcg by mouth daily 1/3/2025    loratadine (CLARITIN) 10 MG tablet Take 10 mg by mouth daily 1/3/2025    Multiple Vitamins-Minerals (MULTIVITAMIN ADULT EXTRA C PO) Take 1 tablet  by mouth every 24 hours 1/2/2025    naloxone (NARCAN) 4 MG/0.1ML nasal spray Spray 1 spray (4 mg) into one nostril alternating nostrils as needed for opioid reversal every 2-3 minutes until assistance arrives Taking As Needed    Nutritional Supplements (SALMON OIL) CAPS Take 2 capsules by mouth daily  1/2/2025    omeprazole (PRILOSEC) 20 MG DR capsule Take 20 mg by mouth daily 1/3/2025    oxyCODONE (ROXICODONE) 5 MG tablet Take 1-2 tablets (5-10 mg) by mouth every 3 hours as needed for severe pain. (Patient taking differently: Take 5 mg by mouth at bedtime.) 1/2/2025    tamsulosin (FLOMAX) 0.4 MG capsule Take 1 capsule (0.4 mg) by mouth daily 1/3/2025

## 2025-01-05 NOTE — PROGRESS NOTES
ICU attending progress note 2025:    Interval events:   Vent settings still FiO2-60% with PEEP-10 with P/F ratio 155.   Norepi up to 0.2. Fentanyl and propofol ggt increased.   Billy on labs  Patient with significant desaturation while being shifted for CXR this morning. PEEP increased to 14 and FiO2 increased to 100% with spo2 recovery.   Family reports that today all family members have gathered that can make it and will look to move towards compassionate extubation and comfort care measures.    Vital signs:  Temp: 98.4  F (36.9  C) Temp  Min: 98.4  F (36.9  C)  Max: 100.1  F (37.8  C)  Resp: 27 Resp  Min: 15  Max: 29  SpO2: 92 % SpO2  Min: 80 %  Max: 99 %    No data recorded  BP: 119/71 Systolic (24hrs), Av , Min:79 , Max:167  Diastolic (24hrs), Av, Min:46, Max:108     Exam:  General: sedated  HEENT: ETT in place  Cardiac: RR, S1S2  Pulmonary: Synchronous with the ventilator, inspiratory rales  Neuro: RASS -2/-3    Labs reviewed.     A/P:  Pt with extensive and long metastatic prostate cancer history with multiple lines of treatment and ongoing malignancy who presents for Sob and found to have saddle PE and concern for PNA on CT leading to acute hypoxic/hypercarbic respiratory failure requiring intubation after failed Bipap and development of ARDs and shock.     Per family/GOC discussion yesterday evening- no further escalation of care with DNR status and likely compassionate extubation today once all family members have arrived considering his very poor prognosis from his metastatic prostate cancer. Hence, pt not proned and CVC not placed; additional treatments of Flolan and steroids held off for the moment.   Overnight, vent settings stable with slight improvement in P/F. However, with shifting, pt quickly derecruited and vent settings went up.   This morning, did give some IVFs to help with vasopressor requirements and suspected prerenal azotemia. Concern for hypocalcemia.  However, before further  interventions, family informed team that they were ready for compassionate extubation and  comfort care measures.     -Will place compassionate extubation and comfort care orders-  high likelihood patient will pass away shortly.     [facility]  CRITICAL CARE STAFF NOTE    I am managing these acute and ongoing critical issues resulting in critical condition that impairs one or more vital organ systems, incur a high probability of imminent or life-threatening deterioration in the patient's condition and providing frequent personal assessment and manipulation of medications and life support equipment.     1. Saddle PE  2. Shock- circulatory/septic  3. Suspected bacterial PNA  4. Acute hypoxic/hypercarbic respiratory failure  5. Extensive hx of metastatic prostate cancer  6. Thrombocytopenia    ICU Interventions: ventilator management, parenteral medications necessitating continuous monitoring including vasoactive medications, medication for heart rhythm control, insulin infusion, and/or sedative/opiates , management of bleeding diathesis including critical illness coagulopathy, anemia of critical illness, aplastic anemia, DIC, hemophilia, ITP, leukemia, and/or TTP, and interpretation of lab values, cardiac output, cxr, pulse oximetry, blood gases, and/or information/data stored in computers    The patient was seen and examined with the fellow.  We have discussed the patient in detail and I agree with the findings, assessment, and plan as documented when this note was cosigned on this day. The plan was formulated in conjunction with pharmacy, ICU nurses, and respiratory therapist. I have evaluated all laboratory values and imaging studies for the past 24 hours. I have reviewed all the consults that have been ordered and are active for this patient.      Critical Care Time:50 min.  I spent this time (excluding procedures) personally providing and directing critical care services at the bedside and on the  critical care unit.      Delano Craig MD   of Medicine, Pulmonary/CC  January 5, 2025

## 2025-01-05 NOTE — PROGRESS NOTES
[facility] ICU RESPIRATORY NOTE        Date of Admission: 1/4/2025     Date of Intubation (most recent): 01/04/2025     Reason for Mechanical Ventilation: Respiratory Failure      Number of Days on Mechanical Ventilation: 2     Met Criteria for Spontaneous Breathing Trial: No     Bite Block: No     Significant Events Today: None    ABG Results:   Recent Labs   Lab 01/05/25  0332 01/04/25  1830 01/04/25  1355 01/04/25  1013   PH 7.35 7.35 7.14* 7.48*   PCO2 39 38 70* 31*   PO2 93 80 103 83   HCO3 21 21 24 23   O2PER 60 60 100 100         Current Vent Settings: FiO2 (%): 60 %, Resp: 18, Vent Mode: CMV/AC, Resp Rate (Set): 22 breaths/min, Tidal Volume (Set, mL): 530 mL, PEEP (cm H2O): 10 cmH2O, Resp Rate (Set): 22 breaths/min, Tidal Volume (Set, mL): 530 mL, PEEP (cm H2O): 10 cmH2O      Mary Bro, RT on 1/5/2025 at 6:22 AM

## 2025-01-05 NOTE — PLAN OF CARE
Problem: Adult Inpatient Plan of Care  Goal: Optimal Comfort and Wellbeing  Intervention: Monitor Pain and Promote Comfort  Recent Flowsheet Documentation  Taken 1/5/2025 0400 by Aspen Pinto RN  Pain Management Interventions: (on Fentahyl drip) medication (see MAR)  Taken 1/5/2025 0200 by Aspen Pinto RN  Pain Management Interventions: (on Fentahyl drip) medication (see MAR)  Taken 1/5/2025 0000 by Aspen Pinto RN  Pain Management Interventions: (on Fentahyl drip) medication (see MAR)  Taken 1/4/2025 2200 by Aspen Pinto RN  Pain Management Interventions: (on Fentahyl drip) medication (see MAR)  Taken 1/4/2025 2000 by Aspen Pinto RN  Pain Management Interventions: (on Fentahyl drip) medication (see MAR)  Taken 1/4/2025 1800 by Aspen Pinto RN  Pain Management Interventions: (on Fentahyl drip) medication (see MAR)  Taken 1/4/2025 1600 by Aspen Pinto RN  Pain Management Interventions: (on Fentahyl drip) medication (see MAR)     Problem: Adult Inpatient Plan of Care  Goal: Plan of Care Review  Description: The Plan of Care Review/Shift note should be completed every shift.  The Outcome Evaluation is a brief statement about your assessment that the patient is improving, declining, or no change.  This information will be displayed automatically on your shift  note.  Flowsheets (Taken 1/5/2025 0519)  Plan of Care Reviewed With: patient  Overall Patient Progress: no change   Goal Outcome Evaluation:      Plan of Care Reviewed With: patient    Overall Patient Progress: no changeOverall Patient Progress: no change     Patient is intubated and sedated, agitated with cares, move all extremities,does not fallow commands. SR to ST, with PVC's and PAC's. LS coarse diminish. Thick white pink secretion from the tube and mount. External catheter in place, patient retain urine, was bladder scanner for 550 ml and starlight cath x 1 for 720 ml. Part A cath and PIV x 2 in place. Propofol,  Norepinephrine , Fentanyl and Argatroban continuous. [facility] bedside till 2100. Continue with plan of care.      Patient yellow color ring is in labeled container in room locked cabinet. Please return the ring to wife Micheline.

## 2025-01-05 NOTE — PROGRESS NOTES
Patient compassionately extubated to comfort at 1010. Patient  at 1026- no LS or heart sounds upon auscultation and no pulses present- MD notified.

## 2025-01-05 NOTE — DISCHARGE SUMMARY
ICU DISCHARGE SUMMARY  Patient Name: Walter Reyes  MR#: [id number]  Date of Admission: 1/4/2025  1:05 AM  Date of Discharge: 1/5/2025  Discharging Physician: Jade Little MD    Discharge Diagnoses:  1. Acute respiratory failure with hypoxia (H)    2. Severe sepsis (H)    3. Acute saddle pulmonary embolism with acute cor pulmonale (H)    4. Pneumonia of both upper lobes due to infectious organism    5. Thrombocytopenia (H)        Procedures Performed this admission:  1/4 - Pulmonary angiogram with thrombectomy (Dr. Wesley)      Consultations:  CARE MANAGEMENT / SOCIAL WORK IP CONSULT  PHARMACY IP CONSULT  PHARMACY TO DOSE ARGATROBAN  HEMATOLOGY & ONCOLOGY IP CONSULT  PHARMACY TO DOSE VANCO    Brief HPI:  Patient is a 64-year-old male with previous history of metastatic prostate carcinoma, chemotherapy induced neuropathy, previous history of renal cell carcinoma s/p nephrectomy, GERD, HTN who initially presented to the outside hospital ED with symptoms of shortness of breath and fever ongoing since 12/30/2024.  Currently, actively undergoing radiotherapy for osseous metastasis.  Last radiotherapy on 12/24.  On 1/2 was found to have new thrombocytopenia with platelet count of 24.     In the ED, workup was significant for a CT PE which showed large volume acute pulmonary embolism with saddle embolus with mild right heart strain.  Extensive groundglass opacities in bilateral upper lobes.  He was given antibiotics, transferred to [facility] for IR angiogram and thrombectomy.  Argatroban was given for anticoagulation per recommendations from heme-onc due to concern for HIT with HIT panel sent.     On transfer here, underwent pulmonary angiogram with mechanical thrombectomy.  Per Dr. Wesley, the thrombi appeared chronic bilaterally with partial removal of the saddle embolus.  He appeared more short of breath and hypoxic postprocedure and was transferred to the ICU on 60 L HFNC 100% FiO2 with O2  sats 84-90%.  Transitioned to BiPAP in the ICU.     In the ICU, patient looks more comfortable on BiPAP.  Complains of minimal shortness of breath.  Main symptom is anxiety and his chronic cancer pain.  Takes oxycodone every day.    Hospital Course:   Walter Reyes was admitted following IR pulmonary angiogram and thrombectomy for saddle pulmonary embolus. He was requiring increasing respiratory support with BiPAP. He was intubated for progressive respiratory failure in the setting of acute respiratory distress syndrome. He had increasing PEEP and P/F ratio of around 100 on 100% FiO2. Oncology was consulted and laid out a a far worse prognosis (1-2 weeks) than initially thought. Discussed at length with family and Oncology. Given worsening ARDS and little to no chance of extubation in the coming days, family elected to transition to comfort cares. The patient was compassionately extubated on 1/5. Time of death 1026.    Discussed with staff, Dr. Sidney Leavitt  Surgical Critical Care Fellow

## 2025-01-05 NOTE — DEATH PRONOUNCEMENT
MD DEATH PRONOUNCEMENT    Called to pronounce Walter Reyes dead.    Physical Exam: Unresponsive to noxious stimuli, Spontaneous respirations absent, Breath sounds absent, Carotid pulse absent, Heart sounds absent, Pupillary light reflex absent, and Corneal blink reflex absent    Patient was pronounced dead at 1026 AM, 2025.    Preliminary Cause of Death: Acute hypoxic respiratory failure    Active Problems:    Thrombocytopenia (H)    Acute respiratory failure with hypoxia (H)    Severe sepsis (H)    Acute saddle pulmonary embolism with acute cor pulmonale (H)    Pneumonia of both upper lobes due to infectious organism       Infectious disease present?: NO    Communicable disease present? (examples: HIV, chicken pox, TB, Ebola, CJD) :  NO    Multi-drug resistant organism present? (example: MRSA): NO    Please consider an autopsy if any of the following exist:  NO Unexpected or unexplained death during or following any dental, medical, or surgical diagnostic treatment procedures.   NO Death of mother at or up to seven days after delivery.     NO All  and pediatric deaths.     NO Death where the cause is sufficiently obscure to delay completion of the death certificate.   NO Deaths in which autopsy would confirm a suspected illness/condition that would affect surviving family members or recipients of transplanted organs.     The following deaths must be reported to the 's Office:  NO A death that may be due entirely or in part to any factors other than natural disease (recent surgery, recent trauma, suspected abuse/neglect).   NO A death that may be an accident, suicide, or homicide.     NO Any sudden, unexpected death in which there is no prior history of significant heart disease or any other condition associated with sudden death.   NO A death under suspicious, unusual, or unexpected circumstances.    NO Any death which is apparently due to natural causes but in which the   does not have a personal physician familiar with the patient s medical history, social, or environmental situation or the circumstances of the terminal event.   NO Any death apparently due to Sudden Infant Death Syndrome.     NO Deaths that occur during, in association with, or as consequences of a diagnostic, therapeutic, or anesthetic procedure.   NO Any death in which a fracture of a major bone has occurred within the past (6) six months.   NO A death of persons note seen by their physician within 120 days of demise.     NO Any death in which the  was an inmate of a public institution or was in the custody of Law Enforcement personnel.   NO  All unexpected deaths of children   NO Solid organ donors   NO Unidentified bodies   NO Deaths of persons whose bodies are to be cremated or otherwise disposed of so that the bodies will later be unavailable for examination;   NO Deaths unattended by a physician outside of a licensed healthcare facility or licensed residential hospice program   NO Deaths occurring within 24 hours of arrival to a health care facility if death is unexpected.    NO Deaths associated with the decedent s employment.   NO Deaths attributed to acts of terrorism.   NO Any death in which there is uncertainty as to whether it is a medical examiner s care should be discussed with the medical investigator.        Body disposition: Body released to the morgue.    Silverio Leavitt  Surgical Critical Care Fellow

## 2025-01-06 LAB
ATRIAL RATE - MUSE: 113 BPM
DIASTOLIC BLOOD PRESSURE - MUSE: NORMAL MMHG
INTERPRETATION ECG - MUSE: NORMAL
P AXIS - MUSE: 24 DEGREES
PR INTERVAL - MUSE: 128 MS
QRS DURATION - MUSE: 64 MS
QT - MUSE: 326 MS
QTC - MUSE: 447 MS
R AXIS - MUSE: 29 DEGREES
SYSTOLIC BLOOD PRESSURE - MUSE: NORMAL MMHG
T AXIS - MUSE: 44 DEGREES
VENTRICULAR RATE- MUSE: 113 BPM

## 2025-01-09 LAB
BACTERIA BLD CULT: NO GROWTH
BACTERIA BLD CULT: NO GROWTH

## (undated) DEVICE — ENDO SHEARS EXTRA LONG 5MM 174601

## (undated) DEVICE — LINEN HALF SHEET 5512

## (undated) DEVICE — GLOVE PROTEXIS POWDER FREE SMT 7.5  2D72PT75X

## (undated) DEVICE — STPL ENDO HANDLE GIA ULTRA UNIVERSAL STD EGIAUSTND

## (undated) DEVICE — SU VICRYL 0 TIE 12X18" J906G

## (undated) DEVICE — PREP CHLORAPREP 26ML TINTED ORANGE  260815

## (undated) DEVICE — ESU GROUND PAD ADULT W/CORD E7507

## (undated) DEVICE — GLOVE PROTEXIS BLUE W/NEU-THERA 8.0  2D73EB80

## (undated) DEVICE — BLADE KNIFE SURG 10 371110

## (undated) DEVICE — SUCTION IRR STRYKERFLOW II W/TIP 250-070-520

## (undated) DEVICE — CLIP ENDO HEMO-LOC PURPLE LG 544240

## (undated) DEVICE — GLOVE PROTEXIS BLUE W/NEU-THERA 6.5  2D73EB65

## (undated) DEVICE — BAG CLEAR TRASH 1.3M 39X33" P4040C

## (undated) DEVICE — Device

## (undated) DEVICE — DECANTER BAG 2002S

## (undated) DEVICE — ENDO TROCAR FIRST ENTRY KII FIOS ADV FIX 05X100MM CFF03

## (undated) DEVICE — SU VICRYL 3-0 SH 27" J316H

## (undated) DEVICE — TUBING CONMED AIRSEAL SMOKE EVAC INSUFFLATION ASM-EVAC

## (undated) DEVICE — SU VICRYL 3-0 SH 27" UND J416H

## (undated) DEVICE — KIT INTRODUCER FLUENT MICRO 5FRX10CM ECHO TIP KIT-038-04

## (undated) DEVICE — SURGICEL HEMOSTAT 2X14" 1951

## (undated) DEVICE — ENDO TROCAR SLEEVE KII Z-THREADED 05X100MM CTS02

## (undated) DEVICE — LINEN FULL SHEET 5511

## (undated) DEVICE — CATH TRAY FOLEY COUDE SURESTEP 16FR W/DRN BAG LATEX A304416A

## (undated) DEVICE — GLOVE PROTEXIS W/NEU-THERA 6.5  2D73TE65

## (undated) DEVICE — SOL NACL 0.9% IRRIG 3000ML BAG 2B7477

## (undated) DEVICE — DRAPE IOBAN INCISE 23X17" 6650EZ

## (undated) DEVICE — CONNECTOR MALE TO MALE LL

## (undated) DEVICE — ENDO TROCAR FIRST ENTRY KII FIOS Z-THRD 12X100MM CTF73

## (undated) DEVICE — SU VICRYL 0 UR-6 27" J603H

## (undated) DEVICE — DRAPE LAP W/POUCH 9430

## (undated) DEVICE — LINEN ORTHO ACL PACK 5447

## (undated) DEVICE — SU MONOCRYL 4-0 P-3 18" UND Y494G

## (undated) DEVICE — ENDO POUCH UNIVERSAL RETRIEVAL SYSTEM INZII 12/15MM CD004

## (undated) DEVICE — GOWN XLG DISP 9545

## (undated) DEVICE — ESU CORD MONOPOLAR 10'  E0510

## (undated) DEVICE — SU MONOCRYL 4-0 PS-2 27" UND Y426H

## (undated) DEVICE — TAPE CLOTH ADHESIVE 3" LATEX FREE

## (undated) DEVICE — LINEN POUCH DBL 5427

## (undated) DEVICE — STPL ENDO RELOAD 45MM VASCULAR MEDIUM TAN EGIA45AVM

## (undated) DEVICE — SU VICRYL 0 CT-1 27" J340H

## (undated) DEVICE — LINEN GOWN XLG 5407

## (undated) DEVICE — CLIP APPLIER ENDO ROTATING 10MM MED/LG ER320

## (undated) DEVICE — SU DERMABOND ADVANCED .7ML DNX12

## (undated) DEVICE — ENDO SPONGE ENDOSTICK KITTNER 5MM CHERRY 37002010

## (undated) DEVICE — NDL INSUFFLATION 13GA 120MM C2201

## (undated) DEVICE — COVER ULTRASOUND PROBE W/GEL FLEXI-FEEL 6"X58" LF  25-FF658

## (undated) DEVICE — ESU PENCIL W/HOLSTER E2350H

## (undated) DEVICE — LINEN DRAPE 54X72" 5467

## (undated) DEVICE — SUCTION CANISTER MEDIVAC LINER 3000ML W/LID 65651-530

## (undated) DEVICE — LINEN TOWEL PACK X5 5464

## (undated) DEVICE — SYSTEM CLEARIFY VISUALIZATION 21-345

## (undated) DEVICE — HEMOSTAT ABSORBABLE ARISTA 5GM SM0007-USA

## (undated) DEVICE — KNIFE HANDLE W/15 BLADE 371615

## (undated) RX ORDER — HEPARIN SODIUM (PORCINE) LOCK FLUSH IV SOLN 100 UNIT/ML 100 UNIT/ML
SOLUTION INTRAVENOUS
Status: DISPENSED
Start: 2021-01-04

## (undated) RX ORDER — HEPARIN SODIUM (PORCINE) LOCK FLUSH IV SOLN 100 UNIT/ML 100 UNIT/ML
SOLUTION INTRAVENOUS
Status: DISPENSED
Start: 2020-01-06

## (undated) RX ORDER — HEPARIN SODIUM (PORCINE) LOCK FLUSH IV SOLN 100 UNIT/ML 100 UNIT/ML
SOLUTION INTRAVENOUS
Status: DISPENSED
Start: 2024-11-18

## (undated) RX ORDER — LIDOCAINE HYDROCHLORIDE 10 MG/ML
INJECTION, SOLUTION INFILTRATION; PERINEURAL
Status: DISPENSED
Start: 2025-01-01

## (undated) RX ORDER — PROPOFOL 10 MG/ML
INJECTION, EMULSION INTRAVENOUS
Status: DISPENSED
Start: 2019-03-19

## (undated) RX ORDER — EPHEDRINE SULFATE 50 MG/ML
INJECTION, SOLUTION INTRAMUSCULAR; INTRAVENOUS; SUBCUTANEOUS
Status: DISPENSED
Start: 2019-03-19

## (undated) RX ORDER — FENTANYL CITRATE 50 UG/ML
INJECTION, SOLUTION INTRAMUSCULAR; INTRAVENOUS
Status: DISPENSED
Start: 2019-03-19

## (undated) RX ORDER — HEPARIN SODIUM (PORCINE) LOCK FLUSH IV SOLN 100 UNIT/ML 100 UNIT/ML
SOLUTION INTRAVENOUS
Status: DISPENSED
Start: 2020-10-05

## (undated) RX ORDER — CEFAZOLIN SODIUM 1 G/3ML
INJECTION, POWDER, FOR SOLUTION INTRAMUSCULAR; INTRAVENOUS
Status: DISPENSED
Start: 2019-05-02

## (undated) RX ORDER — HEPARIN SODIUM (PORCINE) LOCK FLUSH IV SOLN 100 UNIT/ML 100 UNIT/ML
SOLUTION INTRAVENOUS
Status: DISPENSED
Start: 2024-01-29

## (undated) RX ORDER — HEPARIN SODIUM (PORCINE) LOCK FLUSH IV SOLN 100 UNIT/ML 100 UNIT/ML
SOLUTION INTRAVENOUS
Status: DISPENSED
Start: 2021-11-11

## (undated) RX ORDER — HYDRALAZINE HYDROCHLORIDE 20 MG/ML
INJECTION INTRAMUSCULAR; INTRAVENOUS
Status: DISPENSED
Start: 2019-03-19

## (undated) RX ORDER — BUPIVACAINE HYDROCHLORIDE 2.5 MG/ML
INJECTION, SOLUTION EPIDURAL; INFILTRATION; INTRACAUDAL
Status: DISPENSED
Start: 2019-03-19

## (undated) RX ORDER — HEPARIN SODIUM (PORCINE) LOCK FLUSH IV SOLN 100 UNIT/ML 100 UNIT/ML
SOLUTION INTRAVENOUS
Status: DISPENSED
Start: 2020-04-06

## (undated) RX ORDER — FENTANYL CITRATE 50 UG/ML
INJECTION, SOLUTION INTRAMUSCULAR; INTRAVENOUS
Status: DISPENSED
Start: 2023-11-21

## (undated) RX ORDER — HEPARIN SODIUM (PORCINE) LOCK FLUSH IV SOLN 100 UNIT/ML 100 UNIT/ML
SOLUTION INTRAVENOUS
Status: DISPENSED
Start: 2021-06-30

## (undated) RX ORDER — HEPARIN SODIUM (PORCINE) LOCK FLUSH IV SOLN 100 UNIT/ML 100 UNIT/ML
SOLUTION INTRAVENOUS
Status: DISPENSED
Start: 2019-10-02

## (undated) RX ORDER — HEPARIN SODIUM (PORCINE) LOCK FLUSH IV SOLN 100 UNIT/ML 100 UNIT/ML
SOLUTION INTRAVENOUS
Status: DISPENSED
Start: 2023-11-21

## (undated) RX ORDER — KETAMINE HCL IN 0.9 % NACL 50 MG/5 ML
SYRINGE (ML) INTRAVENOUS
Status: DISPENSED
Start: 2019-03-19

## (undated) RX ORDER — CEFAZOLIN SODIUM 2 G/100ML
INJECTION, SOLUTION INTRAVENOUS
Status: DISPENSED
Start: 2019-03-19

## (undated) RX ORDER — LIDOCAINE HYDROCHLORIDE 20 MG/ML
JELLY TOPICAL
Status: DISPENSED
Start: 2020-02-03